# Patient Record
Sex: FEMALE | Race: WHITE | NOT HISPANIC OR LATINO | Employment: UNEMPLOYED | URBAN - METROPOLITAN AREA
[De-identification: names, ages, dates, MRNs, and addresses within clinical notes are randomized per-mention and may not be internally consistent; named-entity substitution may affect disease eponyms.]

---

## 2016-07-26 LAB
EXTERNAL ABO GROUPING: NORMAL
EXTERNAL ANTIBODY SCREEN: NORMAL
EXTERNAL HEPATITIS B SURFACE ANTIGEN: NEGATIVE
EXTERNAL HIV-1 ANTIBODY: NEGATIVE
EXTERNAL RH FACTOR: NEGATIVE
EXTERNAL SYPHILIS RPR SCREEN: NORMAL

## 2016-10-25 LAB
EXTERNAL CHLAMYDIA SCREEN: NEGATIVE
EXTERNAL GONORRHEA SCREEN: NEGATIVE

## 2017-01-04 ENCOUNTER — GENERIC CONVERSION - ENCOUNTER (OUTPATIENT)
Dept: OTHER | Facility: OTHER | Age: 27
End: 2017-01-04

## 2017-01-11 ENCOUNTER — GENERIC CONVERSION - ENCOUNTER (OUTPATIENT)
Dept: OTHER | Facility: OTHER | Age: 27
End: 2017-01-11

## 2017-01-12 ENCOUNTER — GENERIC CONVERSION - ENCOUNTER (OUTPATIENT)
Dept: OTHER | Facility: OTHER | Age: 27
End: 2017-01-12

## 2017-01-17 ENCOUNTER — GENERIC CONVERSION - ENCOUNTER (OUTPATIENT)
Dept: OTHER | Facility: OTHER | Age: 27
End: 2017-01-17

## 2017-01-19 ENCOUNTER — GENERIC CONVERSION - ENCOUNTER (OUTPATIENT)
Dept: OTHER | Facility: OTHER | Age: 27
End: 2017-01-19

## 2017-01-25 ENCOUNTER — GENERIC CONVERSION - ENCOUNTER (OUTPATIENT)
Dept: OTHER | Facility: OTHER | Age: 27
End: 2017-01-25

## 2017-01-31 ENCOUNTER — GENERIC CONVERSION - ENCOUNTER (OUTPATIENT)
Dept: OTHER | Facility: OTHER | Age: 27
End: 2017-01-31

## 2017-01-31 ENCOUNTER — HOSPITAL ENCOUNTER (OUTPATIENT)
Facility: HOSPITAL | Age: 27
Discharge: HOME | End: 2017-01-31
Attending: OBSTETRICS & GYNECOLOGY | Admitting: OBSTETRICS & GYNECOLOGY
Payer: COMMERCIAL

## 2017-01-31 VITALS
DIASTOLIC BLOOD PRESSURE: 73 MMHG | SYSTOLIC BLOOD PRESSURE: 115 MMHG | RESPIRATION RATE: 18 BRPM | HEART RATE: 93 BPM | TEMPERATURE: 98.4 F

## 2017-01-31 PROBLEM — Z3A.40 40 WEEKS GESTATION OF PREGNANCY: Status: ACTIVE | Noted: 2017-01-31

## 2017-01-31 PROCEDURE — 59025 FETAL NON-STRESS TEST: CPT | Performed by: OBSTETRICS & GYNECOLOGY

## 2017-01-31 PROCEDURE — 76815 OB US LIMITED FETUS(S): CPT | Performed by: OBSTETRICS & GYNECOLOGY

## 2017-01-31 PROCEDURE — 99203 OFFICE O/P NEW LOW 30 MIN: CPT

## 2017-02-01 ENCOUNTER — GENERIC CONVERSION - ENCOUNTER (OUTPATIENT)
Dept: OTHER | Facility: OTHER | Age: 27
End: 2017-02-01

## 2017-02-03 ENCOUNTER — GENERIC CONVERSION - ENCOUNTER (OUTPATIENT)
Dept: OTHER | Facility: OTHER | Age: 27
End: 2017-02-03

## 2017-02-04 ENCOUNTER — ANESTHESIA EVENT (INPATIENT)
Dept: LABOR AND DELIVERY | Facility: HOSPITAL | Age: 27
End: 2017-02-04
Payer: COMMERCIAL

## 2017-02-04 ENCOUNTER — ANESTHESIA (INPATIENT)
Dept: LABOR AND DELIVERY | Facility: HOSPITAL | Age: 27
End: 2017-02-04
Payer: COMMERCIAL

## 2017-02-04 ENCOUNTER — HOSPITAL ENCOUNTER (INPATIENT)
Facility: HOSPITAL | Age: 27
LOS: 2 days | Discharge: HOME | End: 2017-02-06
Attending: OBSTETRICS & GYNECOLOGY | Admitting: OBSTETRICS & GYNECOLOGY
Payer: COMMERCIAL

## 2017-02-04 DIAGNOSIS — Z3A.40 40 WEEKS GESTATION OF PREGNANCY: Primary | ICD-10-CM

## 2017-02-04 LAB
ABO GROUP BLD: NORMAL
AMPHETAMINES SERPL QL SCN: NEGATIVE
BARBITURATES UR QL: NEGATIVE
BASE EXCESS BLDCOA CALC-SCNC: -3.4 MMOL/L (ref 3–11)
BASE EXCESS BLDCOV CALC-SCNC: -4.4 MMOL/L (ref 1–9)
BASOPHILS # BLD AUTO: 0.02 THOUSANDS/ΜL (ref 0–0.1)
BASOPHILS NFR BLD AUTO: 0 % (ref 0–1)
BENZODIAZ UR QL: NEGATIVE
BLD GP AB SCN SERPL QL: NEGATIVE
COCAINE UR QL: NEGATIVE
EOSINOPHIL # BLD AUTO: 0.31 THOUSAND/ΜL (ref 0–0.61)
EOSINOPHIL NFR BLD AUTO: 3 % (ref 0–6)
ERYTHROCYTE [DISTWIDTH] IN BLOOD BY AUTOMATED COUNT: 13 % (ref 11.6–15.1)
HCO3 BLDCOA-SCNC: 25.1 MMOL/L (ref 17.3–27.3)
HCO3 BLDCOV-SCNC: 22.6 MMOL/L (ref 12.2–28.6)
HCT VFR BLD AUTO: 39.2 % (ref 34.8–46.1)
HGB BLD-MCNC: 13.6 G/DL (ref 11.5–15.4)
LYMPHOCYTES # BLD AUTO: 2.69 THOUSANDS/ΜL (ref 0.6–4.47)
LYMPHOCYTES NFR BLD AUTO: 26 % (ref 14–44)
MCH RBC QN AUTO: 30.8 PG (ref 26.8–34.3)
MCHC RBC AUTO-ENTMCNC: 34.7 G/DL (ref 31.4–37.4)
MCV RBC AUTO: 89 FL (ref 82–98)
METHADONE UR QL: NEGATIVE
MONOCYTES # BLD AUTO: 0.75 THOUSAND/ΜL (ref 0.17–1.22)
MONOCYTES NFR BLD AUTO: 7 % (ref 4–12)
NEUTROPHILS # BLD AUTO: 6.47 THOUSANDS/ΜL (ref 1.85–7.62)
NEUTS SEG NFR BLD AUTO: 64 % (ref 43–75)
NRBC BLD AUTO-RTO: 0 /100 WBCS
O2 CT VFR BLDCOA CALC: 4.6 ML/DL
OPIATES UR QL SCN: NEGATIVE
OXYHGB MFR BLDCOA: 20.3 %
OXYHGB MFR BLDCOV: 48.7 %
PCO2 BLDCOA: 58.6 MM[HG] (ref 30–60)
PCO2 BLDCOV: 48.4 MM HG (ref 27–43)
PCP UR QL: NEGATIVE
PH BLDCOA: 7.25 [PH] (ref 7.23–7.43)
PH BLDCOV: 7.29 [PH] (ref 7.19–7.49)
PLATELET # BLD AUTO: 204 THOUSANDS/UL (ref 149–390)
PMV BLD AUTO: 9.7 FL (ref 8.9–12.7)
PO2 BLDCOA: 14.2 MM HG (ref 5–25)
PO2 BLDCOV: 22.7 MM HG (ref 15–45)
RBC # BLD AUTO: 4.41 MILLION/UL (ref 3.81–5.12)
RH BLD: NEGATIVE
SAO2 % BLDCOV: 11.2 ML/DL
THC UR QL: NEGATIVE
WBC # BLD AUTO: 10.24 THOUSAND/UL (ref 4.31–10.16)

## 2017-02-04 PROCEDURE — 86900 BLOOD TYPING SEROLOGIC ABO: CPT | Performed by: OBSTETRICS & GYNECOLOGY

## 2017-02-04 PROCEDURE — 76815 OB US LIMITED FETUS(S): CPT | Performed by: OBSTETRICS & GYNECOLOGY

## 2017-02-04 PROCEDURE — 85025 COMPLETE CBC W/AUTO DIFF WBC: CPT | Performed by: OBSTETRICS & GYNECOLOGY

## 2017-02-04 PROCEDURE — 0UQMXZZ REPAIR VULVA, EXTERNAL APPROACH: ICD-10-PCS | Performed by: OBSTETRICS & GYNECOLOGY

## 2017-02-04 PROCEDURE — 86592 SYPHILIS TEST NON-TREP QUAL: CPT | Performed by: OBSTETRICS & GYNECOLOGY

## 2017-02-04 PROCEDURE — 3E033VJ INTRODUCTION OF OTHER HORMONE INTO PERIPHERAL VEIN, PERCUTANEOUS APPROACH: ICD-10-PCS | Performed by: OBSTETRICS & GYNECOLOGY

## 2017-02-04 PROCEDURE — 86901 BLOOD TYPING SEROLOGIC RH(D): CPT | Performed by: OBSTETRICS & GYNECOLOGY

## 2017-02-04 PROCEDURE — 86850 RBC ANTIBODY SCREEN: CPT | Performed by: OBSTETRICS & GYNECOLOGY

## 2017-02-04 PROCEDURE — 82805 BLOOD GASES W/O2 SATURATION: CPT | Performed by: OBSTETRICS & GYNECOLOGY

## 2017-02-04 PROCEDURE — 80307 DRUG TEST PRSMV CHEM ANLYZR: CPT | Performed by: OBSTETRICS & GYNECOLOGY

## 2017-02-04 RX ORDER — LIDOCAINE HYDROCHLORIDE 20 MG/ML
INJECTION, SOLUTION EPIDURAL; INFILTRATION; INTRACAUDAL; PERINEURAL AS NEEDED
Status: DISCONTINUED | OUTPATIENT
Start: 2017-02-04 | End: 2017-02-05 | Stop reason: SURG

## 2017-02-04 RX ORDER — OXYTOCIN/RINGER'S LACTATE 30/500 ML
1-30 PLASTIC BAG, INJECTION (ML) INTRAVENOUS
Status: DISCONTINUED | OUTPATIENT
Start: 2017-02-04 | End: 2017-02-06 | Stop reason: HOSPADM

## 2017-02-04 RX ORDER — LIDOCAINE HYDROCHLORIDE AND EPINEPHRINE 20; 5 MG/ML; UG/ML
INJECTION, SOLUTION EPIDURAL; INFILTRATION; INTRACAUDAL; PERINEURAL AS NEEDED
Status: DISCONTINUED | OUTPATIENT
Start: 2017-02-04 | End: 2017-02-04

## 2017-02-04 RX ORDER — SIMETHICONE 80 MG
80 TABLET,CHEWABLE ORAL 4 TIMES DAILY PRN
Status: DISCONTINUED | OUTPATIENT
Start: 2017-02-04 | End: 2017-02-06 | Stop reason: HOSPADM

## 2017-02-04 RX ORDER — BISACODYL 10 MG
10 SUPPOSITORY, RECTAL RECTAL AS NEEDED
Status: DISCONTINUED | OUTPATIENT
Start: 2017-02-04 | End: 2017-02-06 | Stop reason: HOSPADM

## 2017-02-04 RX ORDER — ONDANSETRON 2 MG/ML
4 INJECTION INTRAMUSCULAR; INTRAVENOUS EVERY 6 HOURS PRN
Status: DISCONTINUED | OUTPATIENT
Start: 2017-02-04 | End: 2017-02-04

## 2017-02-04 RX ORDER — ROPIVACAINE HYDROCHLORIDE 2 MG/ML
INJECTION, SOLUTION EPIDURAL; INFILTRATION; PERINEURAL CONTINUOUS PRN
Status: DISCONTINUED | OUTPATIENT
Start: 2017-02-04 | End: 2017-02-05 | Stop reason: SURG

## 2017-02-04 RX ORDER — IBUPROFEN 600 MG/1
600 TABLET ORAL EVERY 6 HOURS PRN
Status: DISCONTINUED | OUTPATIENT
Start: 2017-02-04 | End: 2017-02-06 | Stop reason: HOSPADM

## 2017-02-04 RX ORDER — DIAPER,BRIEF,INFANT-TODD,DISP
1 EACH MISCELLANEOUS AS NEEDED
Status: DISCONTINUED | OUTPATIENT
Start: 2017-02-04 | End: 2017-02-06 | Stop reason: HOSPADM

## 2017-02-04 RX ORDER — DOCUSATE SODIUM 100 MG/1
100 CAPSULE, LIQUID FILLED ORAL 2 TIMES DAILY
Status: DISCONTINUED | OUTPATIENT
Start: 2017-02-05 | End: 2017-02-06 | Stop reason: HOSPADM

## 2017-02-04 RX ORDER — PROMETHAZINE HYDROCHLORIDE 25 MG/ML
12.5 INJECTION, SOLUTION INTRAMUSCULAR; INTRAVENOUS ONCE
Status: COMPLETED | OUTPATIENT
Start: 2017-02-04 | End: 2017-02-04

## 2017-02-04 RX ORDER — DIPHENHYDRAMINE HCL 25 MG
25 TABLET ORAL EVERY 6 HOURS PRN
Status: DISCONTINUED | OUTPATIENT
Start: 2017-02-04 | End: 2017-02-06 | Stop reason: HOSPADM

## 2017-02-04 RX ORDER — BUTORPHANOL TARTRATE 1 MG/ML
1 INJECTION, SOLUTION INTRAMUSCULAR; INTRAVENOUS ONCE
Status: COMPLETED | OUTPATIENT
Start: 2017-02-04 | End: 2017-02-04

## 2017-02-04 RX ORDER — PNV NO.95/FERROUS FUM/FOLIC AC 28MG-0.8MG
TABLET ORAL
COMMUNITY
End: 2020-07-15 | Stop reason: CLARIF

## 2017-02-04 RX ORDER — ACETAMINOPHEN 325 MG/1
650 TABLET ORAL EVERY 6 HOURS PRN
Status: DISCONTINUED | OUTPATIENT
Start: 2017-02-04 | End: 2017-02-06 | Stop reason: HOSPADM

## 2017-02-04 RX ORDER — SODIUM CHLORIDE, SODIUM LACTATE, POTASSIUM CHLORIDE, CALCIUM CHLORIDE 600; 310; 30; 20 MG/100ML; MG/100ML; MG/100ML; MG/100ML
125 INJECTION, SOLUTION INTRAVENOUS CONTINUOUS
Status: DISCONTINUED | OUTPATIENT
Start: 2017-02-04 | End: 2017-02-06 | Stop reason: HOSPADM

## 2017-02-04 RX ORDER — METOCLOPRAMIDE HYDROCHLORIDE 5 MG/ML
10 INJECTION INTRAMUSCULAR; INTRAVENOUS EVERY 6 HOURS PRN
Status: DISCONTINUED | OUTPATIENT
Start: 2017-02-04 | End: 2017-02-06 | Stop reason: HOSPADM

## 2017-02-04 RX ADMIN — SODIUM CHLORIDE, SODIUM LACTATE, POTASSIUM CHLORIDE, AND CALCIUM CHLORIDE 999 ML/HR: .6; .31; .03; .02 INJECTION, SOLUTION INTRAVENOUS at 14:30

## 2017-02-04 RX ADMIN — BUTORPHANOL TARTRATE 1 MG: 1 INJECTION, SOLUTION INTRAMUSCULAR; INTRAVENOUS at 14:55

## 2017-02-04 RX ADMIN — PROMETHAZINE HYDROCHLORIDE 12.5 MG: 25 INJECTION INTRAMUSCULAR; INTRAVENOUS at 15:13

## 2017-02-04 RX ADMIN — MISOPROSTOL 25 MCG: 100 TABLET ORAL at 07:54

## 2017-02-04 RX ADMIN — Medication 2 MILLI-UNITS/MIN: at 17:16

## 2017-02-04 RX ADMIN — HYDROCORTISONE 1 APPLICATION: 10 CREAM TOPICAL at 23:42

## 2017-02-04 RX ADMIN — LIDOCAINE HYDROCHLORIDE 5 ML: 20 INJECTION, SOLUTION EPIDURAL; INFILTRATION; INTRACAUDAL; PERINEURAL at 16:19

## 2017-02-04 RX ADMIN — SODIUM CHLORIDE, SODIUM LACTATE, POTASSIUM CHLORIDE, AND CALCIUM CHLORIDE 125 ML/HR: .6; .31; .03; .02 INJECTION, SOLUTION INTRAVENOUS at 15:31

## 2017-02-04 RX ADMIN — WITCH HAZEL 1 PAD: 500 SOLUTION RECTAL; TOPICAL at 23:43

## 2017-02-04 RX ADMIN — ROPIVACAINE HYDROCHLORIDE 2 ML/HR: 2 INJECTION, SOLUTION EPIDURAL; INFILTRATION at 15:10

## 2017-02-04 RX ADMIN — IBUPROFEN 600 MG: 600 TABLET, FILM COATED ORAL at 23:42

## 2017-02-04 RX ADMIN — BENZOCAINE AND MENTHOL 1 APPLICATION: 20; .5 SPRAY TOPICAL at 23:43

## 2017-02-04 RX ADMIN — LIDOCAINE HYDROCHLORIDE 5 ML: 20 INJECTION, SOLUTION EPIDURAL; INFILTRATION; INTRACAUDAL; PERINEURAL at 16:28

## 2017-02-04 RX ADMIN — SODIUM CHLORIDE 5 MILLION UNITS: 0.9 INJECTION, SOLUTION INTRAVENOUS at 16:06

## 2017-02-04 RX ADMIN — SODIUM CHLORIDE 2.5 MILLION UNITS: 9 INJECTION, SOLUTION INTRAVENOUS at 20:04

## 2017-02-04 RX ADMIN — MISOPROSTOL 25 MCG: 100 TABLET ORAL at 11:10

## 2017-02-04 RX ADMIN — ONDANSETRON 4 MG: 2 INJECTION INTRAMUSCULAR; INTRAVENOUS at 21:27

## 2017-02-05 LAB
ABO GROUP BLD: NORMAL
BLD GP AB SCN SERPL QL: NEGATIVE
FETAL CELL SCN BLD QL ROSETTE: NEGATIVE
RH BLD: NEGATIVE

## 2017-02-05 PROCEDURE — 99213 OFFICE O/P EST LOW 20 MIN: CPT

## 2017-02-05 PROCEDURE — 86900 BLOOD TYPING SEROLOGIC ABO: CPT | Performed by: OBSTETRICS & GYNECOLOGY

## 2017-02-05 PROCEDURE — 86850 RBC ANTIBODY SCREEN: CPT | Performed by: OBSTETRICS & GYNECOLOGY

## 2017-02-05 PROCEDURE — 86901 BLOOD TYPING SEROLOGIC RH(D): CPT | Performed by: OBSTETRICS & GYNECOLOGY

## 2017-02-05 PROCEDURE — 85461 HEMOGLOBIN FETAL: CPT | Performed by: OBSTETRICS & GYNECOLOGY

## 2017-02-05 RX ADMIN — ACETAMINOPHEN 650 MG: 325 TABLET, FILM COATED ORAL at 04:28

## 2017-02-05 RX ADMIN — DOCUSATE SODIUM 100 MG: 100 CAPSULE, LIQUID FILLED ORAL at 18:24

## 2017-02-05 RX ADMIN — IBUPROFEN 600 MG: 600 TABLET, FILM COATED ORAL at 07:12

## 2017-02-05 RX ADMIN — IBUPROFEN 600 MG: 600 TABLET, FILM COATED ORAL at 13:20

## 2017-02-05 RX ADMIN — DOCUSATE SODIUM 100 MG: 100 CAPSULE, LIQUID FILLED ORAL at 08:31

## 2017-02-05 RX ADMIN — ACETAMINOPHEN 650 MG: 325 TABLET, FILM COATED ORAL at 18:24

## 2017-02-05 RX ADMIN — IBUPROFEN 600 MG: 600 TABLET, FILM COATED ORAL at 21:07

## 2017-02-05 RX ADMIN — HUMAN RHO(D) IMMUNE GLOBULIN 300 MCG: 300 INJECTION, SOLUTION INTRAMUSCULAR at 10:23

## 2017-02-06 VITALS
SYSTOLIC BLOOD PRESSURE: 106 MMHG | HEART RATE: 75 BPM | DIASTOLIC BLOOD PRESSURE: 52 MMHG | WEIGHT: 200 LBS | BODY MASS INDEX: 33.32 KG/M2 | HEIGHT: 65 IN | RESPIRATION RATE: 18 BRPM | TEMPERATURE: 97.8 F

## 2017-02-06 LAB — RPR SER QL: NORMAL

## 2017-02-06 PROCEDURE — 90707 MMR VACCINE SC: CPT | Performed by: OBSTETRICS & GYNECOLOGY

## 2017-02-06 RX ORDER — ACETAMINOPHEN 325 MG/1
650 TABLET ORAL EVERY 6 HOURS PRN
Qty: 30 TABLET | Refills: 0
Start: 2017-02-06 | End: 2017-03-08

## 2017-02-06 RX ORDER — DIAPER,BRIEF,INFANT-TODD,DISP
1 EACH MISCELLANEOUS AS NEEDED
Qty: 30 G | Refills: 0
Start: 2017-02-06 | End: 2020-07-22 | Stop reason: HOSPADM

## 2017-02-06 RX ORDER — IBUPROFEN 600 MG/1
600 TABLET ORAL EVERY 6 HOURS PRN
Qty: 30 TABLET | Refills: 0
Start: 2017-02-06 | End: 2020-07-22 | Stop reason: HOSPADM

## 2017-02-06 RX ORDER — DOCUSATE SODIUM 100 MG/1
100 CAPSULE, LIQUID FILLED ORAL 2 TIMES DAILY
Qty: 60 CAPSULE | Refills: 0
Start: 2017-02-06 | End: 2020-07-22 | Stop reason: HOSPADM

## 2017-02-06 RX ADMIN — MEASLES, MUMPS, AND RUBELLA VIRUS VACCINE LIVE 0.5 ML: 1000; 12500; 1000 INJECTION, POWDER, LYOPHILIZED, FOR SUSPENSION SUBCUTANEOUS at 08:05

## 2017-02-06 RX ADMIN — ACETAMINOPHEN 650 MG: 325 TABLET, FILM COATED ORAL at 10:18

## 2017-02-06 RX ADMIN — DOCUSATE SODIUM 100 MG: 100 CAPSULE, LIQUID FILLED ORAL at 08:05

## 2017-02-06 RX ADMIN — IBUPROFEN 600 MG: 600 TABLET, FILM COATED ORAL at 05:58

## 2017-02-09 ENCOUNTER — TELEPHONE (OUTPATIENT)
Dept: LABOR AND DELIVERY | Facility: HOSPITAL | Age: 27
End: 2017-02-09

## 2017-02-10 ENCOUNTER — GENERIC CONVERSION - ENCOUNTER (OUTPATIENT)
Dept: OTHER | Facility: OTHER | Age: 27
End: 2017-02-10

## 2017-02-13 ENCOUNTER — GENERIC CONVERSION - ENCOUNTER (OUTPATIENT)
Dept: OTHER | Facility: OTHER | Age: 27
End: 2017-02-13

## 2017-02-13 LAB — PLACENTA IN STORAGE: NORMAL

## 2017-02-15 ENCOUNTER — GENERIC CONVERSION - ENCOUNTER (OUTPATIENT)
Dept: OTHER | Facility: OTHER | Age: 27
End: 2017-02-15

## 2017-02-28 ENCOUNTER — ALLSCRIPTS OFFICE VISIT (OUTPATIENT)
Dept: OTHER | Facility: OTHER | Age: 27
End: 2017-02-28

## 2017-02-28 DIAGNOSIS — Z00.00 ENCOUNTER FOR GENERAL ADULT MEDICAL EXAMINATION WITHOUT ABNORMAL FINDINGS: ICD-10-CM

## 2017-02-28 DIAGNOSIS — B19.20 VIRAL HEPATITIS C WITHOUT HEPATIC COMA: ICD-10-CM

## 2017-02-28 DIAGNOSIS — O98.412: ICD-10-CM

## 2017-03-07 ENCOUNTER — GENERIC CONVERSION - ENCOUNTER (OUTPATIENT)
Dept: OTHER | Facility: OTHER | Age: 27
End: 2017-03-07

## 2017-03-09 ENCOUNTER — ALLSCRIPTS OFFICE VISIT (OUTPATIENT)
Dept: OTHER | Facility: OTHER | Age: 27
End: 2017-03-09

## 2017-03-17 ENCOUNTER — ALLSCRIPTS OFFICE VISIT (OUTPATIENT)
Dept: OTHER | Facility: OTHER | Age: 27
End: 2017-03-17

## 2017-03-17 LAB — HGB BLD-MCNC: 13.3 G/DL

## 2017-04-03 ENCOUNTER — GENERIC CONVERSION - ENCOUNTER (OUTPATIENT)
Dept: OTHER | Facility: OTHER | Age: 27
End: 2017-04-03

## 2017-04-22 ENCOUNTER — LAB REQUISITION (OUTPATIENT)
Dept: LAB | Facility: HOSPITAL | Age: 27
End: 2017-04-22
Payer: COMMERCIAL

## 2017-04-22 DIAGNOSIS — R07.0 PAIN IN THROAT: ICD-10-CM

## 2017-04-22 PROCEDURE — 87070 CULTURE OTHR SPECIMN AEROBIC: CPT | Performed by: NURSE PRACTITIONER

## 2017-04-22 PROCEDURE — 87147 CULTURE TYPE IMMUNOLOGIC: CPT | Performed by: NURSE PRACTITIONER

## 2017-04-24 LAB — BACTERIA THROAT CULT: NORMAL

## 2017-04-29 ENCOUNTER — ALLSCRIPTS OFFICE VISIT (OUTPATIENT)
Dept: OTHER | Facility: OTHER | Age: 27
End: 2017-04-29

## 2017-05-02 ENCOUNTER — ALLSCRIPTS OFFICE VISIT (OUTPATIENT)
Dept: OTHER | Facility: OTHER | Age: 27
End: 2017-05-02

## 2017-05-02 DIAGNOSIS — A49.1 STREPTOCOCCUS INFECTION: ICD-10-CM

## 2017-05-15 ENCOUNTER — GENERIC CONVERSION - ENCOUNTER (OUTPATIENT)
Dept: OTHER | Facility: OTHER | Age: 27
End: 2017-05-15

## 2017-05-16 ENCOUNTER — APPOINTMENT (OUTPATIENT)
Dept: LAB | Facility: HOSPITAL | Age: 27
End: 2017-05-16
Payer: COMMERCIAL

## 2017-05-16 ENCOUNTER — APPOINTMENT (OUTPATIENT)
Dept: LAB | Facility: HOSPITAL | Age: 27
End: 2017-05-16
Attending: INTERNAL MEDICINE
Payer: COMMERCIAL

## 2017-05-16 DIAGNOSIS — A49.1 STREPTOCOCCUS INFECTION: ICD-10-CM

## 2017-05-16 DIAGNOSIS — O98.412: ICD-10-CM

## 2017-05-16 LAB
ALBUMIN SERPL BCP-MCNC: 3.8 G/DL (ref 3.5–5)
ALP SERPL-CCNC: 115 U/L (ref 46–116)
ALT SERPL W P-5'-P-CCNC: 273 U/L (ref 12–78)
AST SERPL W P-5'-P-CCNC: 128 U/L (ref 5–45)
BASOPHILS # BLD AUTO: 0.03 THOUSANDS/ΜL (ref 0–0.1)
BASOPHILS NFR BLD AUTO: 0 % (ref 0–1)
BILIRUB DIRECT SERPL-MCNC: 0.18 MG/DL (ref 0–0.2)
BILIRUB SERPL-MCNC: 0.53 MG/DL (ref 0.2–1)
EOSINOPHIL # BLD AUTO: 1.06 THOUSAND/ΜL (ref 0–0.61)
EOSINOPHIL NFR BLD AUTO: 15 % (ref 0–6)
ERYTHROCYTE [DISTWIDTH] IN BLOOD BY AUTOMATED COUNT: 12.9 % (ref 11.6–15.1)
HCT VFR BLD AUTO: 38.5 % (ref 34.8–46.1)
HGB BLD-MCNC: 13.2 G/DL (ref 11.5–15.4)
LYMPHOCYTES # BLD AUTO: 2.33 THOUSANDS/ΜL (ref 0.6–4.47)
LYMPHOCYTES NFR BLD AUTO: 33 % (ref 14–44)
MCH RBC QN AUTO: 29.9 PG (ref 26.8–34.3)
MCHC RBC AUTO-ENTMCNC: 34.3 G/DL (ref 31.4–37.4)
MCV RBC AUTO: 87 FL (ref 82–98)
MONOCYTES # BLD AUTO: 0.59 THOUSAND/ΜL (ref 0.17–1.22)
MONOCYTES NFR BLD AUTO: 9 % (ref 4–12)
NEUTROPHILS # BLD AUTO: 2.95 THOUSANDS/ΜL (ref 1.85–7.62)
NEUTS SEG NFR BLD AUTO: 43 % (ref 43–75)
NRBC BLD AUTO-RTO: 0 /100 WBCS
PLATELET # BLD AUTO: 293 THOUSANDS/UL (ref 149–390)
PMV BLD AUTO: 9.2 FL (ref 8.9–12.7)
PROT SERPL-MCNC: 7 G/DL (ref 6.4–8.2)
RBC # BLD AUTO: 4.41 MILLION/UL (ref 3.81–5.12)
WBC # BLD AUTO: 6.97 THOUSAND/UL (ref 4.31–10.16)

## 2017-05-16 PROCEDURE — 80076 HEPATIC FUNCTION PANEL: CPT

## 2017-05-16 PROCEDURE — 36415 COLL VENOUS BLD VENIPUNCTURE: CPT

## 2017-05-16 PROCEDURE — 85025 COMPLETE CBC W/AUTO DIFF WBC: CPT

## 2017-05-20 ENCOUNTER — GENERIC CONVERSION - ENCOUNTER (OUTPATIENT)
Dept: OTHER | Facility: OTHER | Age: 27
End: 2017-05-20

## 2017-05-25 ENCOUNTER — GENERIC CONVERSION - ENCOUNTER (OUTPATIENT)
Dept: OTHER | Facility: OTHER | Age: 27
End: 2017-05-25

## 2017-06-21 ENCOUNTER — GENERIC CONVERSION - ENCOUNTER (OUTPATIENT)
Dept: OTHER | Facility: OTHER | Age: 27
End: 2017-06-21

## 2017-06-27 ENCOUNTER — ALLSCRIPTS OFFICE VISIT (OUTPATIENT)
Dept: OTHER | Facility: OTHER | Age: 27
End: 2017-06-27

## 2017-08-22 ENCOUNTER — GENERIC CONVERSION - ENCOUNTER (OUTPATIENT)
Dept: OTHER | Facility: OTHER | Age: 27
End: 2017-08-22

## 2017-10-17 ENCOUNTER — TRANSCRIBE ORDERS (OUTPATIENT)
Dept: ADMINISTRATIVE | Facility: HOSPITAL | Age: 27
End: 2017-10-17

## 2017-10-17 DIAGNOSIS — R19.7 DIARRHEA, UNSPECIFIED TYPE: ICD-10-CM

## 2017-10-17 DIAGNOSIS — R10.32 LEFT LOWER QUADRANT PAIN: ICD-10-CM

## 2017-10-17 DIAGNOSIS — B18.2 CHRONIC HEPATITIS C WITH HEPATIC COMA (HCC): ICD-10-CM

## 2017-10-17 DIAGNOSIS — K59.00 CONSTIPATION, UNSPECIFIED CONSTIPATION TYPE: Primary | ICD-10-CM

## 2017-10-17 DIAGNOSIS — K62.5 HEMORRHAGE OF ANUS AND RECTUM: ICD-10-CM

## 2017-11-20 ENCOUNTER — GENERIC CONVERSION - ENCOUNTER (OUTPATIENT)
Dept: OTHER | Facility: OTHER | Age: 27
End: 2017-11-20

## 2017-12-26 ENCOUNTER — GENERIC CONVERSION - ENCOUNTER (OUTPATIENT)
Dept: OTHER | Facility: OTHER | Age: 27
End: 2017-12-26

## 2018-01-04 ENCOUNTER — GENERIC CONVERSION - ENCOUNTER (OUTPATIENT)
Dept: OTHER | Facility: OTHER | Age: 28
End: 2018-01-04

## 2018-01-09 ENCOUNTER — GENERIC CONVERSION - ENCOUNTER (OUTPATIENT)
Dept: OTHER | Facility: OTHER | Age: 28
End: 2018-01-09

## 2018-01-09 NOTE — MISCELLANEOUS
Message   Date: 15 Feb 2017 10:58 AM EST, Recorded By: Avery Nolasco For: Sharon Kingod: Ike Chavarria, Self   Phone: [phone] (Home), [phone] (Work)   Reason: Medical Complaint   pt called, she is 12 days pp and was having heavier bleeding    pt was doing a lot of activity    pt was advised to take Ibuprofen every 6 hours and to call us if no improvement in a few days           Active Problems    1  ASCUS with positive high risk HPV cervical (795 01,795 05) (R87 610,R87 810)   2  Chronic hepatitis C affecting antepartum care of mother in second trimester   (647 63,070 54) (O98 412,B18 2)   3  Condyloma acuminata (078 11) (A63 0)   4  Depression with anxiety (300 4) (F41 8)   5  Drug use (305 90) (F19 90)   6  Hepatitis C virus (070 70) (B19 20)   7  Hepatitis C, acute (070 51) (B17 10)   8  History of drug abuse (305 93) (Z87 898)   9  History of heroin abuse (305 53) (Z87 898)   10  History of recurrent UTI (urinary tract infection) (V13 02) (Z87 440)   11  Pregnancy, obstetrical care (V22 1) (Z34 90)   12  Rh negative status during pregnancy, unspecified trimester (V23 89) (O09 899)   13  Suspected damage to fetus from drugs, affecting management of mother, antepartum,    not applicable or unspecified fetus (655 53) (O35 5XX0)    Current Meds   1  Cranberry TABS; Therapy: (Recorded:91Xyq0870) to Recorded   2  Prenate Essential 29-0 6-0 4-340 MG Oral Capsule; One daily; Therapy: 66SIF9021 to (Last Rx:03Nov2016)  Requested for: 51GAW8563 Ordered   3  Probiotic CAPS; Therapy: (Recorded:19Jan2017) to Recorded   4  Tylenol Extra Strength 500 MG Oral Tablet; Therapy: (Recorded:30Jun2016) to Recorded    Allergies    1  No Known Drug Allergies    2  Animal dander - Cats   3  Animal dander - Dogs   4  Latex   5   Pollen    Signatures   Electronically signed by : Mago Corcoran, ; Feb 15 2017 11:00AM EST                       (Author)

## 2018-01-09 NOTE — MISCELLANEOUS
Message   Date: 22 Nov 2016 1:27 PM EST, Recorded By: Kamla Scott For: Rosa Maldonado: Leon Louise   Phone: [phone] (Home), [phone] (Work)   Reason: Other   Patient called, states feels better, went to [facility], was given fluids  Now is calling to reschedule her appt due to work schedule  Active Problems    1  ASCUS with positive high risk HPV cervical (795 01,795 05) (R87 610,R87 810)   2  Chronic hepatitis C affecting antepartum care of mother in second trimester   (647 63,070 54) (O98 412,B18 2)   3  Condyloma acuminata (078 11) (A63 0)   4  Depression with anxiety (300 4) (F41 8)   5  Drug use (305 90) (F19 90)   6  Exposure to parvovirus (V01 79) (Z20 828)   7  Hepatitis C virus (070 70) (B19 20)   8  Hepatitis C, acute (070 51) (B17 10)   9  History of drug abuse (305 93) (Z87 898)   10  Need for influenza vaccination (V04 81) (Z23)   11  Need for Tdap vaccination (V06 1) (Z23)   12  Pregnancy, obstetrical care (V22 1) (Z34 90)   13  Rh negative status during pregnancy, unspecified trimester (V23 89) (O09 899)   14  Suspected damage to fetus from drugs, affecting management of mother, antepartum,    not applicable or unspecified fetus (655 53) (O35 5XX0)    Current Meds   1  Prenate Essential 29-0 6-0 4-340 MG Oral Capsule; One daily; Therapy: 72YPL0875 to (Last Rx:03Nov2016)  Requested for: 27FXT8675 Ordered   2  Tylenol Extra Strength 500 MG Oral Tablet; Therapy: (Recorded:30Jun2016) to Recorded    Allergies    1  No Known Drug Allergies    2  Animal dander - Cats   3  Animal dander - Dogs   4  Latex   5   Pollen    Signatures   Electronically signed by : Chelsea Carlson, ; Nov 22 2016  1:28PM EST                       (Author)

## 2018-01-09 NOTE — MISCELLANEOUS
Message   Date: 13 Feb 2017 12:27 PM EST, Recorded By: Julia Allen For: Maya Gill   Caller: Leon Chapman   Phone: [phone] (Home), [phone] (Work)   Reason: Medical Complaint   pt is having urine frequency, but no pain  Lareleazar Lazcano advised pt to go to urgent care or PCP  Rocio Lazcano Active Problems    1  ASCUS with positive high risk HPV cervical (795 01,795 05) (R87 610,R87 810)   2  Chronic hepatitis C affecting antepartum care of mother in second trimester   (647 63,070 54) (O98 412,B18 2)   3  Condyloma acuminata (078 11) (A63 0)   4  Depression with anxiety (300 4) (F41 8)   5  Drug use (305 90) (F19 90)   6  Hepatitis C virus (070 70) (B19 20)   7  Hepatitis C, acute (070 51) (B17 10)   8  History of drug abuse (305 93) (Z87 898)   9  History of heroin abuse (305 53) (Z87 898)   10  History of recurrent UTI (urinary tract infection) (V13 02) (Z87 440)   11  Pregnancy, obstetrical care (V22 1) (Z34 90)   12  Rh negative status during pregnancy, unspecified trimester (V23 89) (O09 899)   13  Suspected damage to fetus from drugs, affecting management of mother, antepartum,    not applicable or unspecified fetus (655 53) (O35 5XX0)    Current Meds   1  Cranberry TABS; Therapy: (Recorded:81Jdi9012) to Recorded   2  Prenate Essential 29-0 6-0 4-340 MG Oral Capsule; One daily; Therapy: 58EOZ7058 to (Last Rx:03Nov2016)  Requested for: 36BDO7213 Ordered   3  Probiotic CAPS; Therapy: (Recorded:19Jan2017) to Recorded   4  Tylenol Extra Strength 500 MG Oral Tablet; Therapy: (Recorded:30Jun2016) to Recorded    Allergies    1  No Known Drug Allergies    2  Animal dander - Cats   3  Animal dander - Dogs   4  Latex   5   Pollen    Signatures   Electronically signed by : Ashia Alaniz, ; Feb 13 2017 12:28PM EST                       (Author)

## 2018-01-09 NOTE — MISCELLANEOUS
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   offered pt a earlier appt, but she has on Monday and will just wait        Active Problems    1  Depression with anxiety (300 4) (F41 8)   2  Drug use (305 90) (F19 90)   3  Exposure to blood or body fluid (V15 85) (Z77 21)   4  Headache (784 0) (R51)   5  High-risk pregnancy in first trimester (V23 9) (O09 91)    Current Meds   1  Benadryl TABS; Therapy: (Recorded:29Jun2016) to Recorded   2  Colace 100 MG Oral Capsule; Therapy: (Recorded:30Jun2016) to Recorded   3  Dulcolax Milk of Magnesia 400 MG/5ML Oral Suspension; Therapy: (Recorded:30Jun2016) to Recorded   4  Emetrol SOLN;   Therapy: (Recorded:30Jun2016) to Recorded   5  Metamucil Smooth Texture 28 3 % Oral Powder; Therapy: (Recorded:30Jun2016) to Recorded   6  Mylanta Maximum Strength 400-400-40 MG/5ML SUSP; Therapy: (Recorded:00Pdg0990) to Recorded   7  Prenatal TABS; Therapy: (Chris Garcia) to Recorded   8  Subutex 8 MG SUBL (Buprenorphine HCl); Therapy: (Recorded:30Jun2016) to Recorded   9  Tylenol Extra Strength 500 MG Oral Tablet; Therapy: (Recorded:30Jun2016) to Recorded   10  Zantac 75 75 MG Oral Tablet; Therapy: (Recorded:30Jun2016) to Recorded    Allergies    1  No Known Drug Allergies    2  Animal dander - Cats   3  Animal dander - Dogs   4   Pollen    Signatures   Electronically signed by : Slava Gee, ; Jul 20 2016  2:02PM EST                       (Author)

## 2018-01-10 NOTE — MISCELLANEOUS
Message   Date: 22 Aug 2017 3:25 PM EST, Recorded By: Dylan Alford For: Nori Valles   Caller: Thelma Eugene, Self   Phone: [phone] (Home), [phone] (Work)   Reason: Other   Patient called to report taking progesterone only pill, still breastfeeding, not getting menses  Patient is sexually active but started BCP with MP  Informed patient WNL  Continue pill as directed  Active Problems    1  Chronic hepatitis C affecting antepartum care of mother in second trimester   (647 63,070 54) (O98 412,B18 2)   2  Depression with anxiety (300 4) (F41 8)   3  Encounter for initial prescription of contraceptive pills (V25 01) (Z30 011)   4  Encounter for postpartum visit (V24 2) (Z39 2)   5  Epigastric pain (789 06) (R10 13)   6  Group C streptococcal infection (041 03) (A49 1)   7  Irritable bowel syndrome (IBS) (564 1) (K58 9)    Current Meds   1  Cranberry TABS; Therapy: (Recorded:80Wws3966) to Recorded   2  Norethindrone 0 35 MG Oral Tablet; Take 1 tablet daily; Therapy: 21Jun2017 to (Whitley Pean)  Requested for: 21Jun2017; Last   AR:16VJM0318 Ordered   3  Nystatin 248988 UNIT/GM External Cream; APPLY 2-3 TIMES DAILY TO AFFECTED   AREA(S); Therapy: 83IPE5411 to (Evaluate:98Znr6497)  Requested for: 20Jun2017; Last   Rx:20Jun2017 Ordered   4  Penicillin V Potassium 500 MG Oral Tablet; TAKE 1 TABLET 3 TIMES DAILY; Therapy: 93XIC8761 to (Evaluate:92Vpi2131); Last Rx:72Sfs2860 Ordered   5  Prenate Essential 29-0 6-0 4-340 MG Oral Capsule; One daily; Therapy: 73YFO3642 to (Last Rx:03Nov2016)  Requested for: 11PIR7620 Ordered   6  Probiotic CAPS; Therapy: (Recorded:19Jan2017) to Recorded   7  Tylenol Extra Strength 500 MG Oral Tablet; Therapy: (Recorded:30Jun2016) to Recorded    Allergies    1  No Known Drug Allergies    2  Animal dander - Cats   3  Animal dander - Dogs   4  Latex   5   Pollen    Signatures   Electronically signed by : Dylon Jauregui, ; Aug 22 2017  3:27PM EST                       (Author)

## 2018-01-10 NOTE — PROGRESS NOTES
Active Problems    1  ASCUS with positive high risk HPV cervical (795 01,795 05) (R87 610,R87 810)   2  Chronic hepatitis C affecting antepartum care of mother in second trimester   (647 63,070 54) (O98 412,B18 2)   3  Condyloma acuminata (078 11) (A63 0)   4  Depression with anxiety (300 4) (F41 8)   5  Drug use (305 90) (F19 90)   6  Hepatitis C virus (070 70) (B19 20)   7  Hepatitis C, acute (070 51) (B17 10)   8  History of drug abuse (305 93) (Z87 898)   9  History of heroin abuse (305 53) (Z87 898)   10  History of recurrent UTI (urinary tract infection) (V13 02) (Z87 440)   11  Pregnancy, obstetrical care (V22 1) (Z34 90)   12  Rh negative status during pregnancy, unspecified trimester (V23 89) (O09 899)   13  Suspected damage to fetus from drugs, affecting management of mother, antepartum,    not applicable or unspecified fetus (655 53) (O35 5XX0)    Current Meds    1  Prenate Essential 29-0 6-0 4-340 MG Oral Capsule; One daily; Therapy: 35SMN0931 to (Last Rx:03Nov2016)  Requested for: 35TAK4880 Ordered    2  Cranberry TABS; Therapy: (Recorded:88Nme5028) to Recorded   3  Probiotic CAPS; Therapy: (Recorded:19Jan2017) to Recorded   4  Tylenol Extra Strength 500 MG Oral Tablet; Therapy: (Recorded:30Jun2016) to Recorded    Allergies    1  No Known Drug Allergies    2  Animal dander - Cats   3  Animal dander - Dogs   4  Latex   5  Pollen    Results/Data  [id number] Abdominal Ultrasound OB Mark Monteiro:   Procedure: 18053- Ultrasound pregnant uterus real time with image documentation, limited one or more fetuses  The study was done today in the office  Indication: EDC gestational age 44w3d with an LORIE of 1/29/2017 weeks  Exam indication: post dates  Findings:   Amniotic fluid volume: 0 0 + 39 1 + 11 8 + 34 3 = 85 2mm  Fetal heart beat: 136bpm    Fetal position: vertex  Impression: Appropriate NIKOLAY for fetal age        Signatures   Electronically signed by : Tejinder Salazar, ; Feb 1 2017  8:44AM EST (Author)    Electronically signed by : SARAH Johns ; Feb 1 2017  9:00AM EST

## 2018-01-10 NOTE — MISCELLANEOUS
Message   Date: 31 Jan 2017 8:39 AM EST, Recorded By: Juarez Patel For: Rachel Murrieta: Kerrie Aguirre, Self   Phone: [phone] (Home), [phone] (Work)   Reason: Medical Complaint   OB patient called c/o possible contx since 0400, possible ROM  Sent to L&D  Dr Bessy Zheng informed via text  Active Problems    1  ASCUS with positive high risk HPV cervical (795 01,795 05) (R87 610,R87 810)   2  Chronic hepatitis C affecting antepartum care of mother in second trimester   (647 63,070 54) (O98 412,B18 2)   3  Condyloma acuminata (078 11) (A63 0)   4  Depression with anxiety (300 4) (F41 8)   5  Drug use (305 90) (F19 90)   6  Hepatitis C virus (070 70) (B19 20)   7  Hepatitis C, acute (070 51) (B17 10)   8  History of drug abuse (305 93) (Z87 898)   9  History of heroin abuse (305 53) (Z87 898)   10  History of recurrent UTI (urinary tract infection) (V13 02) (Z87 440)   11  Pregnancy, obstetrical care (V22 1) (Z34 90)   12  Rh negative status during pregnancy, unspecified trimester (V23 89) (O09 899)   13  Suspected damage to fetus from drugs, affecting management of mother, antepartum,    not applicable or unspecified fetus (655 53) (O35 5XX0)    Current Meds   1  Cranberry TABS; Therapy: (Recorded:35Xom6142) to Recorded   2  Prenate Essential 29-0 6-0 4-340 MG Oral Capsule; One daily; Therapy: 03BMG6695 to (Last Rx:03Nov2016)  Requested for: 57VYB4638 Ordered   3  Probiotic CAPS; Therapy: (Recorded:19Jan2017) to Recorded   4  Tylenol Extra Strength 500 MG Oral Tablet; Therapy: (Recorded:30Jun2016) to Recorded    Allergies    1  No Known Drug Allergies    2  Animal dander - Cats   3  Animal dander - Dogs   4  Latex   5   Pollen    Signatures   Electronically signed by : Kaylee Salmeron, ; Jan 31 2017  8:43AM EST                       (Author)

## 2018-01-10 NOTE — PROGRESS NOTES
OCT 31 2016         RE: Arielle Diver                             To: SARAH Lanier    MR#: [id number]                                   [address]     : [facility], [address] [place] [address]: [id number]                             Fax: [phone]   (Exam #: [id number])      The LMP of this 23year old,  G1, P0-0-0-0 patient was 2016, giving   her an LORIE of 2017 and a current gestational age of 32 weeks 1 day   by dates  A sonographic examination was performed on OCT 31 2016 using   real time equipment  The ultrasound examination was performed using   abdominal & vaginal techniques  The patient has a BMI of 29 5  Her blood   pressure today was 109/73  Earliest ultrasound found in her record: 2016   9w2d  2016 LORIE      Cardiac motion was observed at 151 bpm       INDICATIONS      smoker   fetal anatomical survey   second trimester bleeding   marijuana use in pregnancy   heroin abuse   maternal hepatitis C      Exam Types      LEVEL II   Transvaginal      RESULTS      Fetus # 1 of 1   Vertex presentation   Fetal growth appeared normal   Placenta Location = Left lateral   No placenta previa   Placenta Grade = I      MEASUREMENTS (* Included In Average GA)      AC              22 6 cm        26 weeks 6 days* (43%)   BPD              7 4 cm        29 weeks 5 days* (>95%)   HC              25 6 cm        27 weeks 4 days* (50%)   Femur            5 4 cm        28 weeks 4 days* (71%)      Humerus          4 9 cm        28 weeks 4 days  (74%)      Cerebellum       3 4 cm        30 weeks 0 days   Biorbit          4 4 cm        27 weeks 4 days   CisternaMagna    8 0 mm      HC/AC           1 13   FL/AC           0 24   FL/BPD          0 73   Ceph Index      0 84   EFW (Ac/Fl/Hc)  1105 grams - 2 lbs 7 oz                 (57%)      THE AVERAGE GESTATIONAL AGE is 28 weeks 1 day +/- 14 days        AMNIOTIC FLUID      Q1: 5 7      Q2: 6 4 Q3: 6 4      Q4: 3 0   NIKOLAY Total = 21 5 cm   Amniotic Fluid: Normal      CERVICAL EVALUATION      SUPINE      Cervical Length: 3 80 cm      OTHER TEST RESULTS           Funneling?: No             Dynamic Changes?: No        Resp  To TFP?: No      ANATOMY      Head                                    Normal   Face/Neck                               Normal   Th  Cav  Normal   Heart                                   Normal   Abd  Cav  Normal   Stomach                                 Normal   Right Kidney                            Normal   Left Kidney                             Normal   Bladder                                 Normal   Abd  Wall                               Normal   Spine                                   Normal   Extrems                                 Normal   Genitalia                               Normal   Placenta                                Normal   Umbl  Cord                              Normal   Uterus                                  Normal   PCI                                     Normal      ANATOMY DETAILS      Visualized Appearing Sonographically Normal:   HEAD: (Calvarium, BPD Level, Cavum, Lateral Ventricles, Choroid Plexus,   Cerebellum, Cisterna Magna);    FACE/NECK: (Neck, Nuchal Fold, Profile,   Orbits, Nose/Lips, Palate, Face);    TH  CAV  : (Lungs, Diaphragm); HEART: (Four Chamber View, Proximal Left Outflow, Proximal Right Outflow,   3VV, 3 Vessel Trachea, Short Axis of Greater Vessels, Ductal Arch, Aortic   Arch, Interventricular Septum, Interatrial Septum, IVC, SVC, Cardiac Axis,   Cardiac Position);    ABD  CAV : (Liver, Gall Bladder);    STOMACH, RIGHT   KIDNEY, LEFT KIDNEY, BLADDER, ABD   WALL, SPINE: (Cervical Spine, Thoracic   Spine, Lumbar Spine, Sacrum);    EXTREMS: (Lt Humerus, Rt Humerus, Lt   Forearm, Rt Forearm, Lt Hand, Rt Hand, Lt Femur, Rt Femur, Lt Low Leg, Rt   Low Leg, Lt Foot, Rt Foot); GENITALIA, PLACENTA, UMBL  CORD, UTERUS, PCI      ADNEXA      The left ovary was not visualized  The right ovary was not visualized  IMPRESSION      Nobles IUP   28 weeks and 1 day by this ultrasound  (LORIE=JAN 22 2017)   Vertex presentation   Fetal growth appeared normal   Normal anatomy survey   Regular fetal heart rate of 151 bpm   Left lateral placenta   No placenta previa      GENERAL COMMENT      OFFICE VISIT      On exam today the patient appears well, in no acute distress, and denies   any complaints  Her abdomen is non-tender  The patient had a history of   a single episode of vaginal bleeding when she was in [place]  For some   reason she did not receive RhoGAM at that time  She is O-  Thankfully,   she has not appear to develop any antibiotics based upon the latest blood   work  She is going to receive RhoGAM at her next prenatal visit  The   patient also has hepatitis C with a viral load from [place] of 3 2 x10(6)   or 6 2 log IU/mL  The patient had a first trimester portion of her Sequential Screening at   our Center  She then moved to [place] and did not complete the second   trimester portion of her blood work  Her risk for trisomy 21 before   screening was 1: 790, after screening her risk is 1: 10,000; her risk for   Trisomy 25 before screening was 1: 2700, after screening her risk is 1:   10,000  The fetal anatomic survey is complete  There is no sonographic evidence   of fetal abnormalities at this time  Good fetal movement and tone are   seen  The amniotic fluid volume appears normal   The placenta is left   lateral and it appears sonographically normal   A transvaginal ultrasound   was performed to assess the cervix, which was not seen well   transabdominally  The cervical length was 3 8 centimeters, which is   normal for the current gestational age  There was no significant   funneling or dynamic changes appreciated   The patient was informed of   today's findings and all of her questions were answered  The limitations   of ultrasound were reviewed with the patient, which she appears to   understand  We reviewed the risks with hepatitis C in pregnancy  In general, the risk   of maternal to child transmission is low with hepatitis C  in general   between 4-9% depending on studies  The risk appears to be increased in   this patient's with high viremia, and in those patients with concomitant   HIV, which the patient does not have  The patient's viral load is   moderate in most  Studies  In general, we recommend avoiding invasive   procedures such as amniocentesis and fetal scalp electrodes unless   absolutely necessary despite the lack of good evidence that suggests a   significant increased risk of maternal to child transmission  Outside of   avoiding invasive procedures, no significant change in obstetrical   practice needs to occur   section is recommended only for   traditional obstetrical reasons and not to avoid  transmission of   Hepatitis C  The patient can breastfeed as long as she does not have   cracked bleeding nipples  The patient has been clean for 4 months now  She has also quit smoking as   well  Recommend further scans as clinically indicated  Precautions were   reviewed  Please note, in addition to the time spent discussing the results of the   ultrasound, I spent approximately 10 minutes of face-to-face time with the   patient, greater than 50% of which was spent in counseling and the   coordination of care for this patient  Thank you very much for allowing us to participate in the care of this   very nice patient  Should you have any questions, please do not hesitate   to contact our office  SHAKILA Cook M D     Electronically signed 10/31/16 18:18

## 2018-01-11 NOTE — RESULT NOTES
Discussion/Summary   Liver enzymes are elevated (but not dangerously elevated) most likely from her hepatitis C  She should followup in the office and we will discuss further  Verified Results  (1) HEPATIC FUNCTION PANEL [id number] 12:27PM Alix Jessa Order Number: [id number]     Test Name Result Flag Reference   ALBUMIN 3 8 g/dL  3 5-5 0   - Patient Instructions:  This is a non fasting blood test  Please drink two glasses of water the morning of test    ALK PHOSPHATAS 115 U/L     ALT (SGPT) 273 U/L H 12-78   AST(SGOT) 128 U/L H 5-45   BILI, DIRECT 0 18 mg/dL  0 00-0 20   BILI, TOTAL 0 53 mg/dL  0 20-1 00   TOTAL PROTEIN 7 0 g/dL  6 4-8 2

## 2018-01-11 NOTE — MISCELLANEOUS
Message  Return to work or school:   Anh Chu is under my professional care  She was seen in my office on  11/23/2016 her appt was scheduled for 10:30 am and was seen by the doctor at 11:00am      Dr Amaya Del Valle  Signatures   Electronically signed by :  Jhonnie Fothergill, ; Nov 23 2016 11:16AM EST                       (Author)

## 2018-01-11 NOTE — MISCELLANEOUS
Message   Date: 14 Dec 2016 11:25 AM EST, Recorded By: Scar Gutiérrez For: Virgie Cardenas, Self   Phone: [phone] (Home), [phone] (Work)   Reason: Medical Complaint   pt said that she is nervous because the baby had super bad hiccups and it never stopped    pt is worried about the cord    pt will have an U/S and appt with the dr         Active Problems    1  ASCUS with positive high risk HPV cervical (795 01,795 05) (R87 610,R87 810)   2  Chronic hepatitis C affecting antepartum care of mother in second trimester   (647 63,070 54) (O98 412,B18 2)   3  Condyloma acuminata (078 11) (A63 0)   4  Depression with anxiety (300 4) (F41 8)   5  Drug use (305 90) (F19 90)   6  Exposure to parvovirus (V01 79) (Z20 828)   7  Hepatitis C virus (070 70) (B19 20)   8  Hepatitis C, acute (070 51) (B17 10)   9  History of drug abuse (305 93) (Z87 898)   10  History of recurrent UTI (urinary tract infection) (V13 02) (Z87 440)   11  History of UTI (V13 02) (Z87 440)   12  Pregnancy, obstetrical care (V22 1) (Z34 90)   13  Rh negative status during pregnancy, unspecified trimester (V23 89) (O09 899)   14  Suspected damage to fetus from drugs, affecting management of mother, antepartum,    not applicable or unspecified fetus (655 53) (O35 5XX0)   13  Urinary tract infection (599 0) (N39 0)    Current Meds   1  Nitrofurantoin Macrocrystal 100 MG Oral Capsule; TAKE 1 CAPSULE EVERY 12 HOURS   DAILY; Therapy: 13WMP2926 to (Complete:27Nkb0465)  Requested for: 59QDJ9128; Last   Rx:87Hij4220 Ordered   2  Prenate Essential 29-0 6-0 4-340 MG Oral Capsule; One daily; Therapy: 67ZTA7331 to (Last Rx:03Nov2016)  Requested for: 50IKE9881 Ordered   3  Tylenol Extra Strength 500 MG Oral Tablet; Therapy: (Recorded:30Jun2016) to Recorded    Allergies    1  No Known Drug Allergies    2  Animal dander - Cats   3  Animal dander - Dogs   4  Latex   5   Pollen    Signatures   Electronically signed by : Cynthia Kirk, ; Dec 14 2016 11:27AM EST                       (Author)

## 2018-01-11 NOTE — MISCELLANEOUS
Message   Date: 24 Oct 2016 2:53 PM EST, Recorded By: Alfonso Burkett For: Jamison Broges, Self   Phone: [phone] (Home), [phone] (Work)   Reason: Medical Complaint   pt has a yellow watery discharge    pt will come in sooner for her OB visit    Active Problems    1  Depression with anxiety (300 4) (F41 8)   2  Drug use (305 90) (F19 90)   3  Exposure to blood or body fluid (V15 85) (Z77 21)   4  Headache (784 0) (R51)   5  High-risk pregnancy in first trimester (V23 9) (O09 91)   6  Pregnancy, obstetrical care (V22 1) (Z34 90)    Current Meds   1  Benadryl TABS; Therapy: (Recorded:29Jun2016) to Recorded   2  Emetrol SOLN;   Therapy: (Recorded:30Jun2016) to Recorded   3  Prenatal TABS; Therapy: (Jerome Santiago) to Recorded   4  Subutex 8 MG SUBL (Buprenorphine HCl); Therapy: (Recorded:30Jun2016) to Recorded   5  Tylenol Extra Strength 500 MG Oral Tablet; Therapy: (Recorded:30Jun2016) to Recorded    Allergies    1  No Known Drug Allergies    2  Animal dander - Cats   3  Animal dander - Dogs   4  Latex   5   Pollen    Signatures   Electronically signed by : Sun Newman, ; Oct 24 2016  2:54PM EST                       (Author)

## 2018-01-11 NOTE — MISCELLANEOUS
Message   Recorded as Task   Date: 11/09/2016 07:29 AM, Created By: Kayla Triplett   Task Name: Go to Result   Assigned To: Kristi Burnette   Regarding Patient: Ian An, Status: Active   CommentMarnatanael Kearney - 09 Nov 2016 7:29 AM     TASK CREATED  Parvovirus IgG positive with IgM negative, consistent with prior exposure to fifth disease  No concerns for the pregnancy given this result        Active Problems    1  ASCUS with positive high risk HPV cervical (795 01,795 05) (R87 610,R87 810)   2  Chronic hepatitis C affecting antepartum care of mother in second trimester   (647 63,070 54) (O98 412,B18 2)   3  Condyloma acuminata (078 11) (A63 0)   4  Depression with anxiety (300 4) (F41 8)   5  Drug use (305 90) (F19 90)   6  Exposure to parvovirus (V01 79) (Z20 828)   7  Hepatitis C virus (070 70) (B19 20)   8  Hepatitis C, acute (070 51) (B17 10)   9  History of drug abuse (305 93) (Z87 898)   10  Pregnancy, obstetrical care (V22 1) (Z34 90)   11  Rh negative status during pregnancy, unspecified trimester (V23 89) (O09 899)   12  Suspected damage to fetus from drugs, affecting management of mother, antepartum,    not applicable or unspecified fetus (655 53) (O35 5XX0)    Current Meds   1  Prenate Essential 29-0 6-0 4-340 MG Oral Capsule; One daily; Therapy: 63OIT4460 to (Last Rx:03Nov2016)  Requested for: 23ZOT0790 Ordered   2  Tylenol Extra Strength 500 MG Oral Tablet; Therapy: (Recorded:30Jun2016) to Recorded    Allergies    1  No Known Drug Allergies    2  Animal dander - Cats   3  Animal dander - Dogs   4  Latex   5   Pollen    Signatures   Electronically signed by : Arina Tse, ; Nov 9 2016  9:21AM EST                       (Author)

## 2018-01-11 NOTE — PROGRESS NOTES
2016         RE: Laura Leroy                             To: SARAH Gill    MR#: [id number]                                   [address]     : [address], [address]: [id number]                             Fax: [phone]   (Exam #: [id number])      The LMP of this 23year old,  G1, P0-0-0-0 patient was 2016, giving   her an LORIE of 2017 and a current gestational age of 17 weeks 1 day   by dates  A sonographic examination was performed on 2016 using   real time equipment  The ultrasound examination was performed using   abdominal technique  The patient has a BMI of 24 8  Her blood pressure   today was 110/74  Earliest ultrasound found in her record: 2016   9w2d  2016 LORIE   Multiple longitudinal and transverse sections revealed a brown   intrauterine pregnancy with the fetus in variable presentation  The   placenta is anterior in implantation, grade 0 in appearance, and there is   no placenta previa  Cardiac motion was observed at 155 bpm       INDICATIONS      smoker   history of drug dependence   first trimester genetic screening      Exam Types      Level I      RESULTS      Fetus # 1 of 1   Variable presentation   Fetal growth appeared normal      MEASUREMENTS (* Included In Average GA)      CRL              7 0 cm        13 weeks 0 days*   Nuchal Trans    2 00 mm      THE AVERAGE GESTATIONAL AGE is 13 weeks 0 days +/- 7 days  UTERINE ARTERIES                                  S/D   PI    RI    NOTCH       Left Uterine Artery        2 51  1 07  0 60       Right Uterine Artery       5 60  2 25  0 82      ANATOMY COMMENTS      Anatomic detail is limited at this gestational age  The yolk sac was not   noted  The fetal cranium appeared normal in shape and the nuchal   translucency was normal in size (2 0mm)  The nasal bone appears to be   present    The intracranial anatomy was unremarkable  Evaluation of the   spine revealed no obvious evidence for a neural tube defect  Anatomy of   the fetal thorax appeared within normal limits  The cardiac rhythm was   regular  Within the abdomen, stomach & bladder were visualized and the   abdominal wall appeared intact  A three vessel cord appears to be present  Active movement of the fetal body & extremities was seen  There is no   suspicion of a subchorionic bleed  The placental cord insertion was   normal    The uterine artery Dopplers are normal for this gestational age  There is no suspicion of a uterine myoma  Free fluid is not seen in the   posterior cul-de-sac  AMNIOTIC FLUID         Largest Vertical Pocket = 4 2 cm   Amniotic Fluid: Normal      IMPRESSION      [facility] IUP   13 weeks and 0 days by this ultrasound  (LORIE=2017)   Variable presentation   Fetal growth appeared normal   Regular fetal heart rate of 155 bpm   Anterior placenta   No placenta previa      GENERAL COMMENT         I had the pleasure of seeing [facility] for a   sequential screen  She is a cigarette smoker and has a history of heroin   use  She is currently cleared of her heroin use  She also has a history of   asthma  She denies any medical or surgical problems at this stage  Today's ultrasound showed a viable fetus in a variable presentation  The   amniotic fluid and overall fetal growth appeared normal  The placenta is    anterior in location and there is no evidence of a placenta previa  There   were no obvious anomalies seen in the fetus today  Down syndrome screening   was reassuring as the nuchal translucency was normal in size and the nasal   bone was present  The uterine artery Doppler flow studies were normal   bilaterally  There were no subchorionic hematomas or uterine myomas  Note that the patient did go on to complete the bloodwork portion of   today's visit  Today's ultrasound was overall reassuring  The nuchal   translucency measured 2 0 mm in an enlarged midsagittal plane and the   nasal bone was present  There were no obvious birth defects  The uterine   artery Doppler flow studies are normal  The fetus appears to be growing   well as today's ultrasound correlates well with her due date  She is going   to be moving to [place] in the near future and I wished her well for safe   and healthy delivery  Total face-to-face time with the patient, excluding   ultrasound time was 10 minutes with more than 50% of the time devoted to   counseling and coordination of care  SHAKILA Jaime M D     Maternal-Fetal Medicine   Electronically signed 07/25/16 13:10

## 2018-01-11 NOTE — MISCELLANEOUS
Provider Comments  Provider Comments:   CALLED PT TO VERIFY IF SHE WAS COMING TO HER APPOINTMENT THIS MORNING AND PT STATED SHE   WAS NOT COMING DESPITE BEING OFFERED MULTIPLE OTHER APPOINTMENT TIMES, SHE STATED SHE WOULD KEEP HER SCHEDULED APPOINTMENT FOR NEXT WEEK,  DR GAITAN WAS INFORMED  Signatures   Electronically signed by :  Christa Paz, ; Jan 4 2017 10:23AM EST                       (Author)

## 2018-01-12 NOTE — MISCELLANEOUS
Message  Message Free Text Note Form: Patient called with complaints of nausea and some discomfort in the epigastric area  She was concerned about some type of food poisoning as she ate salmon yesterday  However, she notes no fevers or diarrhea or abdominal cramping  Likely, she most likely has some type of gastroesophageal reflux symptoms  Recommend Tums or Rolaids to see if this will help  Of note, the patient denies contractions and notes good fetal movement        Signatures   Electronically signed by : SARAH Ryder ; Nov 20 2016  1:54PM EST                       (Author)

## 2018-01-12 NOTE — MISCELLANEOUS
Message   Date: 15 May 2017 3:44 PM EST, Recorded By: Juarez Patel For: Agustin Chan   Caller: Leon Franklin   Phone: [phone] (Home), [phone] (Work)   Reason: Renew Medication   Patient called for refill of Nystatin cream   Escript sent  Active Problems    1  Chronic hepatitis C affecting antepartum care of mother in second trimester   (647 63,070 54) (O98 412,B18 2)   2  Depression with anxiety (300 4) (F41 8)   3  Encounter for postpartum visit (V24 2) (Z39 2)   4  Group C streptococcal infection (041 03) (A49 1)   5  Hepatitis C virus infection (070 70) (B19 20)    Current Meds   1  Cranberry TABS; Therapy: (Recorded:71Fac9831) to Recorded   2  Nystatin 881735 UNIT/GM External Cream; APPLY 2-3 TIMES DAILY TO AFFECTED   AREA(S); Therapy: 74CYD9140 to (Evaluate:83Lse0769)  Requested for: 94IUO4143; Last   Rx:04Apr2017 Ordered   3  Penicillin V Potassium 500 MG Oral Tablet; TAKE 1 TABLET 3 TIMES DAILY; Therapy: 52UIJ7688 to (Evaluate:63Sof0998); Last Rx:60Moj6325 Ordered   4  Prenate Essential 29-0 6-0 4-340 MG Oral Capsule; One daily; Therapy: 77PXZ0707 to (Last Rx:03Nov2016)  Requested for: 06UBA6532 Ordered   5  Probiotic CAPS; Therapy: (Recorded:19Jan2017) to Recorded   6  Tylenol Extra Strength 500 MG Oral Tablet; Therapy: (Recorded:30Jun2016) to Recorded    Allergies    1  No Known Drug Allergies    2  Animal dander - Cats   3  Animal dander - Dogs   4  Latex   5   Pollen    Plan  PMH: History of candidiasis    · Nystatin 970303 UNIT/GM External Cream; APPLY 2-3 TIMES DAILY TO  AFFECTED AREA(S)    Signatures   Electronically signed by : Kaylee Salmeron, ; May 15 2017  3:45PM EST                       (Author)

## 2018-01-12 NOTE — MISCELLANEOUS
Message   Recorded as Task   Date: 11/23/2016 11:20 AM, Created By: Grant Soto   Task Name: Miscellaneous   Assigned To: [facility]   Regarding Patient: Barbara Fields, Status: In Progress   CommentRosana Webb - 23 Nov 2016 11:20 AM     TASK CREATED  Patient was in [place] until around 10/19/16 in [facility] area  Was bitten by mosquitoes  Interested in [place] testing  Need to contact the lab about how to do so  Thanks   [facility] [place] - 25 Nov 2016 9:35 AM     TASK REPLIED TO: Previously Assigned To [facility] - 11/23/2016 11:20 AM  TASK CREATED  Patient was in [place] until around 10/19/16 in [facility] area  Was bitten by mosquitoes  Interested in [place] testing  Need to contact the lab about how to do so  Thanks   Zane Zurita - 28 Nov 2016 8:15 AM     TASK REPLIED TO: Previously Assigned To Zane Zurita  I need the nursing team to call the lab and find out the mechanism for arranging for Zika testing  Thanks  Then, we have to contact the patient and arrange for such testing if she is interested in doing so  Maya Gill - 28 Nov 2016 10:55 AM     TASK IN PROGRESS   [facility] - 28 Nov 2016 1:03 PM     TASK EDITED  Patient does not meet the requirements for this to be covered under insurance  Dr Jacqui Cage aware  I spoke to patient, she is aware  Active Problems    1  ASCUS with positive high risk HPV cervical (795 01,795 05) (R87 610,R87 810)   2  Chronic hepatitis C affecting antepartum care of mother in second trimester   (647 63,070 54) (O98 412,B18 2)   3  Condyloma acuminata (078 11) (A63 0)   4  Depression with anxiety (300 4) (F41 8)   5  Drug use (305 90) (F19 90)   6  Exposure to parvovirus (V01 79) (Z20 828)   7  Hepatitis C virus (070 70) (B19 20)   8  Hepatitis C, acute (070 51) (B17 10)   9  History of drug abuse (305 93) (Z87 898)   10  Need for influenza vaccination (V04 81) (Z23)   11  Need for Tdap vaccination (V06 1) (Z23)   12  Pregnancy, obstetrical care (V22 1) (Z34 90)   13  Rh negative status during pregnancy, unspecified trimester (V23 89) (O09 899)   14  Suspected damage to fetus from drugs, affecting management of mother, antepartum,    not applicable or unspecified fetus (655 53) (O35 5XX0)    Current Meds   1  Prenate Essential 29-0 6-0 4-340 MG Oral Capsule; One daily; Therapy: 65FLW9392 to (Last Rx:03Nov2016)  Requested for: 74XEU4243 Ordered   2  Tylenol Extra Strength 500 MG Oral Tablet; Therapy: (Recorded:30Jun2016) to Recorded    Allergies    1  No Known Drug Allergies    2  Animal dander - Cats   3  Animal dander - Dogs   4  Latex   5  Pollen    Signatures   Electronically signed by :  Gustavo Ordoñez, ; Nov 28 2016  1:04PM EST                       (Author)

## 2018-01-12 NOTE — PROGRESS NOTES
Assessment    1  Chronic hepatitis C affecting antepartum care of mother in second trimester   (647 63,070 54) (O98 412,B18 2)   2  History of drug abuse (305 93) (Z87 898)   3  Chronic constipation (564 00) (K59 09)    Plan  Chronic constipation, Chronic hepatitis C affecting antepartum care of mother in second  trimester    · Follow-up visit in 1 year Evaluation and Treatment  Follow-up  Status: Hold For -  Scheduling  Requested for: 39WSQ5530   Ordered; For: Chronic constipation, Chronic hepatitis C affecting antepartum care of mother in second trimester; Ordered By: Deven Rios Performed:  Due: 85SDX1453  Chronic hepatitis C affecting antepartum care of mother in second trimester    · (1) HEPATIC FUNCTION PANEL; Status:Active; Requested LWY:81UXP0243;    Perform:[facility]; BBB:19CCF5741; Ordered; For:Chronic hepatitis C affecting antepartum care of mother in second trimester; Ordered By:Colin Shirley;    Discussion/Summary  Discussion Summary:   She appears to have chronic hepatitis C infection  I spoke to her about the evaluation and treatment of hepatitis C infection  She prefers to defer treatment until after she is done breastfeeding and I think this is reasonable  I will check her hepatic function panel to see if there is been an abrupt change in the activity of the hepatitis C  Since she wants to begin treatment after she is done breast-feeding in about one year, I will defer rechecking her viral load, genotype, fibrosis score, and other lab testing since it will have to be repeated in one year anyway at the time she submits for treatment  She has chronic constipation and I've asked her to try taking MiraLAX twice a day chronically for the constipation and to let me know if her symptoms do not improve  She should have an elective colonoscopy because of her constipation at her convenience  Since she does not have any alarm symptoms this could wait until after she is done breast-feeding  Chief Complaint  Chief Complaint Free Text Note Form: Patient follow up   Chief Complaint Chronic Condition [facility]: Patient is here today for follow up of chronic conditions described in HPI  History of Present Illness  HPI: She presents for followup because of her diagnosis of hepatitis C infection  She said she had exposure through [facility] about 10 months ago and then had lab testing about 7 months ago that was positive  She does not know her genotype  She is currently asymptomatic and she denies fatigue, abdominal pain, nausea, vomiting, rash, joint pains, and weight loss  She delivered a healthy baby girl about one month ago and has been breast-feeding without any issues  History Reviewed: The history was obtained today from the patient and I agree with the documented history  Review of Systems  Complete-Female GI Adult:   Constitutional: No fever, no chills, feels well, no tiredness, no recent weight gain or weight loss  Eyes: No complaints of eye pain, no red eyes, no eyesight problems, no discharge, no dry eyes, no itching of eyes  ENT: no complaints of earache, no loss of hearing, no nose bleeds, no nasal discharge, no sore throat, no hoarseness  Cardiovascular: No complaints of slow heart rate, no fast heart rate, no chest pain, no palpitations, no leg claudication, no lower extremity edema  Respiratory: No complaints of shortness of breath, no wheezing, no cough, no SOB on exertion, no orthopnea, no PND  Gastrointestinal: No complaints of abdominal pain, no constipation, no nausea or vomiting, no diarrhea, no bloody stools  Genitourinary: No complaints of dysuria, no incontinence, no pelvic pain, no dysmenorrhea, no vaginal discharge or bleeding  Musculoskeletal: No complaints of arthralgias, no myalgias, no joint swelling or stiffness, no limb pain or swelling  Integumentary: No complaints of skin rash or lesions, no itching, no skin wounds, no breast pain or lump  Neurological: No complaints of headache, no confusion, no convulsions, no numbness, no dizziness or fainting, no tingling, no limb weakness, no difficulty walking  Psychiatric: Not suicidal, no sleep disturbance, no anxiety or depression, no change in personality, no emotional problems  Endocrine: No complaints of proptosis, no hot flashes, no muscle weakness, no deepening of the voice, no feelings of weakness  Hematologic/Lymphatic: No complaints of swollen glands, no swollen glands in the neck, does not bleed easily, does not bruise easily  ROS Reviewed:   ROS reviewed  Active Problems    1  ASCUS with positive high risk HPV cervical (795 01,795 05) (R87 610,R87 810)   2  Candidiasis (112 9) (B37 9)   3  Chronic hepatitis C affecting antepartum care of mother in second trimester   (647 63,070 54) (O98 412,B18 2)   4  Condyloma acuminata (078 11) (A63 0)   5  Depression with anxiety (300 4) (F41 8)   6  Drug use (305 90) (F19 90)   7  Hepatitis C virus (070 70) (B19 20)   8  Hepatitis C, acute (070 51) (B17 10)   9  History of drug abuse (305 93) (Z87 898)   10  History of heroin abuse (305 53) (Z87 898)   11  History of recurrent UTI (urinary tract infection) (V13 02) (Z87 440)   12  Pregnancy, obstetrical care (V22 1) (Z34 90)   13  Rh negative status during pregnancy, unspecified trimester (V23 89) (O09 899)   14  Suspected damage to fetus from drugs, affecting management of mother, antepartum,    not applicable or unspecified fetus (655 53) (O35 5XX0)    Past Medical History    1  History of Anxiety (300 00) (F41 9)   2  History of Anxiety during pregnancy in third trimester, antepartum (648 43,300 00)   (O99 343,F41 9)   3  History of Asthma (493 90) (J45 909)   4  History of Bacterial vaginosis (616 10,041 9) (N76 0,B96 89)   5  History of Denial Of Any Significant Medical History   6  History of Dysuria (788 1) (R30 0)   7  History of Exposure to blood or body fluid (V15 85) (Z77 21)   8  History of Exposure to parvovirus (V01 79) (Z20 828)   9  History of  1 (V22 0) (Z34 00)   10  History of High-risk pregnancy in first trimester (V23 9) (O09 91)   11  History of acute pharyngitis (V12 69) (Z87 09)   12  History of breast lump (V13 89) (Z87 898)   13  History of candidal vulvovaginitis (V13 29) (Z86 19)   14  History of galactorrhea (V12 29) (Z87 59)   15  History of headache (V13 89) (Z87 898)   16  History of pregnancy (V13 29)   17  History of urinary tract infection (V13 02) (Z87 440)   18  History of vaginal discharge (V13 29) (Z87 42)   19  History of vaginal discharge (V13 29) (Z87 42)   20  History of Intravenous drug abuse, episodic (305 92) (F19 10)   21  History of Pregnancy at early stage (V22 2) (Z33 1)   22  History of Urinary Tract Infection (V13 02)   23  History of Vaginitis due to Candida albicans (112 1) (B37 3)  Active Problems And Past Medical History Reviewed: The active problems and past medical history were reviewed and updated today  Surgical History    1  History of Knee Surgery   2  History of Oral Surgery  Surgical History Reviewed: The surgical history was reviewed and updated today  Family History  Brother    1  Family history of Asthma (V17 5)  Family History    2  Family history of Diabetes Mellitus (V18 0)   3  Family history of No Significant Family History  Family History Reviewed: The family history was reviewed and updated today  Social History    · Denied: History of Alcohol   · Birth Control Method - Oral Contraceptives   · Caffeine Use   · Drug use (305 90) (F19 90)   · Drug Use (305 90)   · Former smoker (V15 82) (T93 528)   · No alcohol use   · [employer]   · Uses safety equipment  Social History Reviewed: The social history was reviewed and updated today  Current Meds   1  Cranberry TABS; Therapy: (Recorded:91Dla1987) to Recorded   2   Nystatin 958363 UNIT/GM External Cream; APPLY 2-3 TIMES DAILY TO AFFECTED AREA(S); Therapy: 92LKT3803 to (Evaluate:10Mar2017)  Requested for: 70LEK2661; Last   Rx:03Mar2017 Ordered   3  Prenate Essential 29-0 6-0 4-340 MG Oral Capsule; One daily; Therapy: 42NWF2561 to (Last Rx:03Nov2016)  Requested for: 99RRZ7284 Ordered   4  Probiotic CAPS; Therapy: (Recorded:19Jan2017) to Recorded   5  Tylenol Extra Strength 500 MG Oral Tablet; Therapy: (Recorded:30Jun2016) to Recorded  Medication List Reviewed: The medication list was reviewed and updated today  Allergies    1  No Known Drug Allergies    2  Animal dander - Cats   3  Animal dander - Dogs   4  Latex   5  Pollen    Vitals  Vital Signs    Recorded: 16STG0147 02:05PM   Temperature 97 3 F, Tympanic   Heart Rate 98   Systolic 825, LUE, Sitting   Diastolic 76, LUE, Sitting   Height 5 ft 5 in   Weight 172 lb 6 0 oz   BMI Calculated 28 68   BSA Calculated 1 86   O2 Saturation 98     Physical Exam    Constitutional   General appearance: No acute distress, well appearing and well nourished  Eyes   Conjunctiva and lids: No swelling, erythema or discharge  Pupils and irises: Equal, round and reactive to light  Ears, Nose, Mouth, and Throat   External inspection of ears and nose: Normal     Nasal mucosa, septum, and turbinates: Normal without edema or erythema  Oropharynx: Normal with no erythema, edema, exudate or lesions  Pulmonary   Respiratory effort: No increased work of breathing or signs of respiratory distress  Auscultation of lungs: Clear to auscultation  Cardiovascular   Auscultation of heart: Normal rate and rhythm, normal S1 and S2, without murmurs  Examination of extremities for edema and/or varicosities: Normal     Abdomen   Abdomen: Non-tender, no masses  Liver and spleen: No hepatomegaly or splenomegaly  Lymphatic   Palpation of lymph nodes in neck: No lymphadenopathy  Musculoskeletal   Gait and station: Normal     Digits and nails: Normal without clubbing or cyanosis  Inspection/palpation of joints, bones, and muscles: Normal     Skin   Skin and subcutaneous tissue: Normal without rashes or lesions  Psychiatric   Orientation to person, place, and time: Normal     Mood and affect: Normal          Future Appointments    Date/Time Provider Specialty Site   03/17/2017 10:15 AM SARAH Chatterjee   Obstetrics/Gynecology [facility]     Signatures   Electronically signed by : Shadi Luis MD; Mar  9 2017  2:37PM EST                       (Author)

## 2018-01-12 NOTE — MISCELLANEOUS
Message   Date: 07 Nov 2016 1:04 PM EST, Recorded By: Thai Hall   Calling For: Rosa Maldonado: Fransisca Gonzalez, Leon   Phone: [phone] (Home), [phone] (Work)   Reason: Medical Complaint   Patient called worried about being possibly exposed to Fifth's Disease from her young brother, although he was not officially diagnosed  Pt is fine and has no sx  Per Dr Mony Vazquez sent patient for B19 Parvovirus, IGG, IGM  Active Problems    1  ASCUS with positive high risk HPV cervical (795 01,795 05) (R87 610,R87 810)   2  Chronic hepatitis C affecting antepartum care of mother in second trimester   (647 63,070 54) (O98 412,B18 2)   3  Condyloma acuminata (078 11) (A63 0)   4  Depression with anxiety (300 4) (F41 8)   5  Drug use (305 90) (F19 90)   6  Exposure to parvovirus (V01 79) (Z20 828)   7  Hepatitis C virus (070 70) (B19 20)   8  Hepatitis C, acute (070 51) (B17 10)   9  History of drug abuse (305 93) (Z87 898)   10  Pregnancy, obstetrical care (V22 1) (Z34 90)   11  Rh negative status during pregnancy, unspecified trimester (V23 89) (O09 899)   12  Suspected damage to fetus from drugs, affecting management of mother, antepartum,    not applicable or unspecified fetus (655 53) (O35 5XX0)    Current Meds   1  Prenate Essential 29-0 6-0 4-340 MG Oral Capsule; One daily; Therapy: 40LZL8557 to (Last Rx:03Nov2016)  Requested for: 14DTM7063 Ordered   2  Tylenol Extra Strength 500 MG Oral Tablet; Therapy: (Recorded:30Jun2016) to Recorded    Allergies    1  No Known Drug Allergies    2  Animal dander - Cats   3  Animal dander - Dogs   4  Latex   5   Pollen    Signatures   Electronically signed by : Chelsea Carlson, ; Nov 7 2016  1:07PM EST                       (Author)

## 2018-01-13 VITALS
BODY MASS INDEX: 33.15 KG/M2 | DIASTOLIC BLOOD PRESSURE: 83 MMHG | WEIGHT: 199 LBS | HEIGHT: 65 IN | SYSTOLIC BLOOD PRESSURE: 118 MMHG

## 2018-01-13 VITALS
DIASTOLIC BLOOD PRESSURE: 80 MMHG | WEIGHT: 169 LBS | BODY MASS INDEX: 28.16 KG/M2 | OXYGEN SATURATION: 98 % | HEART RATE: 97 BPM | HEIGHT: 65 IN | SYSTOLIC BLOOD PRESSURE: 112 MMHG | TEMPERATURE: 98.7 F

## 2018-01-13 NOTE — MISCELLANEOUS
Message   Recorded as Task   Date: 11/20/2017 08:35 AM, Created By: Nigel Cabral   Task Name: Care Coordination   Assigned To: Maya Gill   Regarding Patient: Florina Crooks, Status: Active   Comment:    Maya Gill - 20 Nov 2017 8:35 AM     TASK CREATED  pt knows she has a UTI  Karrie Nissen she wants to know if you can give her macrobid? also pt is on the breastfeeding bc pill    she is done breastfeeding and wants to go on a regular pill    she was never on any other pill before    what should we give her? Karrie Nissen   pt knows that she is due for a yearly exam   Zane Zurita - 20 Nov 2017 12:59 PM     TASK REPLIED TO: Previously Assigned To Zane Zurita  Can treat with Macrobid twice daily for 7 days for presumed UTI  Would recommend she continue the mini pill and then come in for yearly exam and we can review contraception options at that time  Thanks   Maya Gill - 20 Nov 2017 1:24 PM     TASK EDITED  sent script to dr for the macrobid and left message with pt to call and schedule a yearly exam  Active Problems    1  Chronic hepatitis C affecting antepartum care of mother in second trimester   (647 63,070 54) (O98 412,B18 2)   2  Depression with anxiety (300 4) (F41 8)   3  Encounter for initial prescription of contraceptive pills (V25 01) (Z30 011)   4  Encounter for postpartum visit (V24 2) (Z39 2)   5  Epigastric pain (789 06) (R10 13)   6  Group C streptococcal infection (041 03) (A49 1)   7  Irritable bowel syndrome (IBS) (564 1) (K58 9)   8  Urinary tract infection without hematuria, site unspecified (599 0) (N39 0)    Current Meds   1  Cranberry TABS; Therapy: (Recorded:54Xvp8494) to Recorded   2  Norethindrone 0 35 MG Oral Tablet; Take 1 tablet daily; Therapy: 21Jun2017 to (Hayley Ferrell)  Requested for: 21Jun2017; Last   JV:81AFF1518 Ordered   3  Nystatin 748723 UNIT/GM External Cream; APPLY 2-3 TIMES DAILY TO AFFECTED   AREA(S);    Therapy: 11TPO8609 to (Evaluate:58Qcd5299)  Requested for: 20Jun2017; Last   Rx:20Jun2017 Ordered   4  Penicillin V Potassium 500 MG Oral Tablet; TAKE 1 TABLET 3 TIMES DAILY; Therapy: 14MBU8203 to (Evaluate:97Eqv9604); Last Rx:29Apr2017 Ordered   5  Prenate Essential 29-0 6-0 4-340 MG Oral Capsule; One daily; Therapy: 99XPE2633 to (Last Rx:03Nov2016)  Requested for: 54JIV3107 Ordered   6  Probiotic CAPS; Therapy: (Recorded:19Jan2017) to Recorded   7  Tylenol Extra Strength 500 MG Oral Tablet; Therapy: (Recorded:30Jun2016) to Recorded    Allergies    1  No Known Drug Allergies    2  Animal dander - Cats   3  Animal dander - Dogs   4  Latex   5   Pollen    Plan  Urinary tract infection without hematuria, site unspecified    · Nitrofurantoin Monohyd Macro 100 MG Oral Capsule; TAKE 1 CAPSULE EVERY 12  HOURS DAILY    Signatures   Electronically signed by : Naty Horner, ; Nov 20 2017  1:24PM EST                       (Author)

## 2018-01-13 NOTE — MISCELLANEOUS
Message   Recorded as Task   Date: 12/12/2016 09:59 AM, Created By: Ekaterina Clark   Task Name: Go to Result   Assigned To: Ky [place]   Regarding Patient: Marimar Shah, Status: Active   CommentRik Felix - 12 Dec 2016 9:59 AM     TASK CREATED  Urine culture is negative, please inform the patient  Active Problems    1  ASCUS with positive high risk HPV cervical (795 01,795 05) (R87 610,R87 810)   2  Chronic hepatitis C affecting antepartum care of mother in second trimester   (647 63,070 54) (O98 412,B18 2)   3  Condyloma acuminata (078 11) (A63 0)   4  Depression with anxiety (300 4) (F41 8)   5  Drug use (305 90) (F19 90)   6  Exposure to parvovirus (V01 79) (Z20 828)   7  Hepatitis C virus (070 70) (B19 20)   8  Hepatitis C, acute (070 51) (B17 10)   9  History of drug abuse (305 93) (Z87 898)   10  History of recurrent UTI (urinary tract infection) (V13 02) (Z87 440)   11  History of UTI (V13 02) (Z87 440)   12  Pregnancy, obstetrical care (V22 1) (Z34 90)   13  Rh negative status during pregnancy, unspecified trimester (V23 89) (O09 899)   14  Suspected damage to fetus from drugs, affecting management of mother, antepartum,    not applicable or unspecified fetus (655 53) (O35 5XX0)   13  Urinary tract infection (599 0) (N39 0)    Current Meds   1  Nitrofurantoin Macrocrystal 100 MG Oral Capsule; TAKE 1 CAPSULE EVERY 12 HOURS   DAILY; Therapy: 38ICQ6328 to (Complete:99Xnk0930)  Requested for: 37ZBF3185; Last   Rx:21Wxx9236 Ordered   2  Prenate Essential 29-0 6-0 4-340 MG Oral Capsule; One daily; Therapy: 52JFU3647 to (Last Rx:03Nov2016)  Requested for: 34UVA2663 Ordered   3  Tylenol Extra Strength 500 MG Oral Tablet; Therapy: (Recorded:30Jun2016) to Recorded    Allergies    1  No Known Drug Allergies    2  Animal dander - Cats   3  Animal dander - Dogs   4  Latex   5   Pollen    Signatures   Electronically signed by : Cory Minor, ; Dec 12 2016  3:56PM EST (Author)

## 2018-01-13 NOTE — MISCELLANEOUS
Message  Return to work or school:        Robbin Ochoa is a patient here for prenatal care  Her EDC is 1/29/17  She may have dental X-Rays done with double shield  She may have novocaine without epinephrine as needed  Jamison De Dios MD / yuridia yarbrough        Signatures   Electronically signed by : Laura Thompson, ; Aug  1 2016 12:12PM EST                       (Author)

## 2018-01-13 NOTE — PROGRESS NOTES
2016         RE: Meredith Mathews                             To: MarilinSARAH Madrigal    MR#: [id number]                                   [address]     : [facility], [address] [place] [address] Street: [phone]:[facility]                             Fax: [phone]   (Exam #: [id number])      The LMP of this 23year old,  G1, P0-0-0-0 patient was unknown, her   working LORIE is 2017 and the current gestational age is 10 weeks 2   days by [address] [place] [address]  A sonographic examination was performed on 2016 using real time equipment  The ultrasound examination was   performed using abdominal technique  The patient has a BMI of 24 8  Her   blood pressure today was 117/72  Earliest ultrasound found in her record: 2016   9w2d  2016 LORIE   Multiple longitudinal and transverse sections revealed a nobles   intrauterine pregnancy  A normal gestational sac was documented  A normal fetal pole was   visualized  Cardiac motion was observed at 149 bpm  The yolk sac was not   seen  INDICATIONS      dating   smoker   history of drug dependence      Exam Types      Level I      MEASUREMENTS (* Included In Average GA)      CRL              2 6 cm        9 weeks 2 days *      THE AVERAGE GESTATIONAL AGE is 9 weeks 2 days +/- 5 days  IMPRESSION      Nobles IUP   9 weeks and 2 days by this ultrasound  (LORIE=2017)   9 weeks and 2 days by 1st Tri Sono  (LORIE=2017)   Regular fetal heart rate of 149 bpm      GENERAL COMMENT      I had the pleasure of seeing Meredith Mathews in the  center on    for initial bleeding scan  She has a history of drug use and has   been on heroin in the past but no longer  She is not on any drug   medication at this time  She does smoke a pack of cigarettes per day  She   also has exercise-induced asthma  She has a history of knee surgery as   well   She denies any other medical or surgical concerns  She denies any   medication allergies or any exposure to alcohol  Her BMI is 24  Her blood   pressure today was 117/72 mmHg  Today's ultrasound showed a viable fetus  The crown-rump length measured   2 6 cm consistent with 9 weeks and 2 days gestational age  Thus I believe   he should use 2017 as her due date  Today a normal gestational   sac was seen  The fetal pole was seen and cardiac motion was observed at   149 bpm  The yolk sac was not seen well today  There was no evidence of a   subchorionic hematoma  I discussed the epidemiologic association of maternal tobacco use in   pregnancy as an independent risk factor for such adverse    outcomes such as fetal growth restriction  delivery, placental   abruption, intrauterine fetal demise, childhood neuro behavioral   abnormalities, childhood respiratory disease including asthma and   bronchitis, and the increased possibility of sudden infant death syndrome  We also discussed the importance of avoiding environmental tobacco smoke   given its combusted but nonfiltered status  We discussed measures by which   smoking cessation may be accomplished including behavioral modification   and pharmacotherapy  Pharmacotherapy would include products such as   nicotine replacement gum and patches and Wellbutrin therapy also known as   Zyban  The nicotine replacement dose should be based on the amount of   cigarettes she currently smokes  She should not smoke cigarettes and use   nicotine replacement at the same time as this can induce cardiac problems  Note that she is currently not on any illicit drugs including heroin which   is reassuring  She will return in 3-4 weeks for her initial sequential   screening  Thank you kindly for this referral       Total face-to-face time with the patient, excluding ultrasound time was 15   minutes with more than 50% of the time devoted to counseling and   coordination of care        Thank you very much for allowing me to participate in the care of your   patient  If you have any questions or concerns about today's visit, please   do not hesitate to call me  Sincerely,      SARAH Nelson R D M S Verneice Na, M D     Maternal-Fetal Medicine   Electronically signed 07/01/16 10:20

## 2018-01-14 VITALS
OXYGEN SATURATION: 98 % | WEIGHT: 172.38 LBS | TEMPERATURE: 97.3 F | SYSTOLIC BLOOD PRESSURE: 110 MMHG | HEIGHT: 65 IN | BODY MASS INDEX: 28.72 KG/M2 | DIASTOLIC BLOOD PRESSURE: 76 MMHG | HEART RATE: 98 BPM

## 2018-01-14 VITALS
DIASTOLIC BLOOD PRESSURE: 70 MMHG | HEART RATE: 101 BPM | HEIGHT: 65 IN | RESPIRATION RATE: 18 BRPM | WEIGHT: 157 LBS | TEMPERATURE: 98 F | SYSTOLIC BLOOD PRESSURE: 101 MMHG | BODY MASS INDEX: 26.16 KG/M2

## 2018-01-14 VITALS
HEART RATE: 97 BPM | DIASTOLIC BLOOD PRESSURE: 74 MMHG | OXYGEN SATURATION: 98 % | BODY MASS INDEX: 27.03 KG/M2 | SYSTOLIC BLOOD PRESSURE: 120 MMHG | HEIGHT: 65 IN | TEMPERATURE: 70 F | WEIGHT: 162.25 LBS

## 2018-01-14 NOTE — MISCELLANEOUS
Message   Date: 03 Apr 2017 3:53 PM EST, Recorded By: Juliana Moseley For: Kimberly Grammes: Manolo Burgos, Self   Phone: [phone] (Home), [phone] (Work)   Reason: Medical Complaint   pt said that the yeast is back on her breast  sent script for refill on her nystatin cream  Active Problems    1  Chronic hepatitis C affecting antepartum care of mother in second trimester   (647 63,070 54) (O98 412,B18 2)   2  Depression with anxiety (300 4) (F41 8)   3  Encounter for postpartum visit (V24 2) (Z39 2)   4  Hepatitis C virus infection (070 70) (B19 20)    Current Meds   1  Cranberry TABS; Therapy: (Recorded:51Egb5469) to Recorded   2  Nystatin 612817 UNIT/GM External Cream; APPLY 2-3 TIMES DAILY TO AFFECTED   AREA(S); Therapy: 96QFY3554 to (Evaluate:10Mar2017)  Requested for: 77NBK2688; Last   Rx:03Mar2017 Ordered   3  Prenate Essential 29-0 6-0 4-340 MG Oral Capsule; One daily; Therapy: 68TCQ3122 to (Last Rx:03Nov2016)  Requested for: 29REQ5497 Ordered   4  Probiotic CAPS; Therapy: (Recorded:19Jan2017) to Recorded   5  Tylenol Extra Strength 500 MG Oral Tablet; Therapy: (Recorded:30Jun2016) to Recorded    Allergies    1  No Known Drug Allergies    2  Animal dander - Cats   3  Animal dander - Dogs   4  Latex   5   Pollen    Plan  PMH: History of candidiasis    · Nystatin 414421 UNIT/GM External Cream; APPLY 2-3 TIMES DAILY TO  AFFECTED AREA(S)    Signatures   Electronically signed by : Arina Tse, ; Apr  3 2017  3:54PM EST                       (Author)

## 2018-01-14 NOTE — MISCELLANEOUS
Message  Return to work or school:        Olga Lemon is a patient here for prenatal care  She may have dental x-rays if necessary with double shield  Abel Villatoro MD / yuridia lpn        Signatures   Electronically signed by : Benny Bryant, ; Jul 5 2016  4:34PM EST                       (Author)

## 2018-01-14 NOTE — MISCELLANEOUS
Message   Date: 10 Feb 2017 1:28 PM EST, Recorded By: Fay Due For: Shreya Burnette   Caller: Thien Kurtz, Leon   Phone: [phone] (Home), [phone] (Work)   Reason: Medical Complaint   Patient called c/o vulvar itching and soreness, delivered 4 days ago, thinks she has Malay  [facility] Malay, can use OTC HCC or Aquaphor on irritated vulvar tissue  Pt also c/o nipple soreness  Advised Lanolin, continue to BF, change positions  Pt states bowels and bladder WNL, minimal bleeding, feeling well, states is very happy  Active Problems    1  ASCUS with positive high risk HPV cervical (795 01,795 05) (R87 610,R87 810)   2  Chronic hepatitis C affecting antepartum care of mother in second trimester   (647 63,070 54) (O98 412,B18 2)   3  Condyloma acuminata (078 11) (A63 0)   4  Depression with anxiety (300 4) (F41 8)   5  Drug use (305 90) (F19 90)   6  Hepatitis C virus (070 70) (B19 20)   7  Hepatitis C, acute (070 51) (B17 10)   8  History of drug abuse (305 93) (Z87 898)   9  History of heroin abuse (305 53) (Z87 898)   10  History of recurrent UTI (urinary tract infection) (V13 02) (Z87 440)   11  Pregnancy, obstetrical care (V22 1) (Z34 90)   12  Rh negative status during pregnancy, unspecified trimester (V23 89) (O09 899)   13  Suspected damage to fetus from drugs, affecting management of mother, antepartum,    not applicable or unspecified fetus (655 53) (O35 5XX0)    Current Meds   1  Cranberry TABS; Therapy: (Recorded:16Cve0068) to Recorded   2  Prenate Essential 29-0 6-0 4-340 MG Oral Capsule; One daily; Therapy: 99UBG4848 to (Last Rx:03Nov2016)  Requested for: 75DDU1109 Ordered   3  Probiotic CAPS; Therapy: (Recorded:19Jan2017) to Recorded   4  Tylenol Extra Strength 500 MG Oral Tablet; Therapy: (Recorded:30Jun2016) to Recorded    Allergies    1  No Known Drug Allergies    2  Animal dander - Cats   3  Animal dander - Dogs   4  Latex   5  Pollen    Signatures   Electronically signed by : Aislinn Fields, ; Feb 10 2017  1:40PM EST                       (Author)

## 2018-01-15 NOTE — MISCELLANEOUS
Message   Date: 11 Nov 2016 9:47 AM EST, Recorded By: Tory Zarco For: Shaggy Carcamo, Self   Phone: [phone] (Home), [phone] (Work)   Reason: Medical Complaint   pt feels that her movement is different and slower    pt will come in for a NST      Called patient as she still did not arrive in office  She stated she needed to find a ride and should arrive shortly  1        1 Amended By: Julio Cesar Aceves; Nov 11 2016 11:01 AM EST    Active Problems   1  ASCUS with positive high risk HPV cervical (795 01,795 05) (R87 610,R87 810)  2  Chronic hepatitis C affecting antepartum care of mother in second trimester   (647 63,070 54) (O98 412,B18 2)  3  Condyloma acuminata (078 11) (A63 0)  4  Depression with anxiety (300 4) (F41 8)  5  Drug use (305 90) (F19 90)  6  Exposure to parvovirus (V01 79) (Z20 828)  7  Hepatitis C virus (070 70) (B19 20)  8  Hepatitis C, acute (070 51) (B17 10)  9  History of drug abuse (305 93) (Z87 898)  10  Need for influenza vaccination (V04 81) (Z23)  11  Need for Tdap vaccination (V06 1) (Z23)  12  Pregnancy, obstetrical care (V22 1) (Z34 90)  13  Rh negative status during pregnancy, unspecified trimester (V23 89) (O09 899)  14  Suspected damage to fetus from drugs, affecting management of mother, antepartum,    not applicable or unspecified fetus (655 53) (O35 5XX0)    Current Meds  1  Prenate Essential 29-0 6-0 4-340 MG Oral Capsule; One daily; Therapy: 71XWC7253 to (Last Rx:03Nov2016)  Requested for: 55JIQ0105 Ordered  2  Tylenol Extra Strength 500 MG Oral Tablet; Therapy: (Recorded:30Jun2016) to Recorded    Allergies   1  No Known Drug Allergies   2  Animal dander - Cats  3  Animal dander - Dogs  4  Latex  5   Pollen    Signatures   Electronically signed by : Mehdi Oro, ; Nov 14 2016 10:36AM EST                       (Author)

## 2018-01-15 NOTE — MISCELLANEOUS
Message   Date: 04 Nov 2016 10:57 AM EST, Recorded By: Rudy Odom For: Vj Has: Leon Rodriguez   Phone: [phone] (Home), [phone] (Work)   Reason: Other   Pharmacist from [facility] in [address] called to ask if he can change patient's PNV  Prenate Essential has been discontinued  Advised ok to give equivalent substitute  Active Problems    1  ASCUS with positive high risk HPV cervical (795 01,795 05) (R87 610,R87 810)   2  Chronic hepatitis C affecting antepartum care of mother in second trimester   (647 63,070 54) (O98 412,B18 2)   3  Condyloma acuminata (078 11) (A63 0)   4  Depression with anxiety (300 4) (F41 8)   5  Drug use (305 90) (F19 90)   6  Hepatitis C virus (070 70) (B19 20)   7  Hepatitis C, acute (070 51) (B17 10)   8  History of drug abuse (305 93) (Z87 898)   9  Pregnancy, obstetrical care (V22 1) (Z34 90)   10  Rh negative status during pregnancy, unspecified trimester (V23 89) (O09 899)   11  Suspected damage to fetus from drugs, affecting management of mother, antepartum,    not applicable or unspecified fetus (655 53) (O35 5XX0)    Current Meds   1  Prenate Essential 29-0 6-0 4-340 MG Oral Capsule; One daily; Therapy: 24XQL6219 to (Last Rx:03Nov2016)  Requested for: 60VDE4173 Ordered   2  Tylenol Extra Strength 500 MG Oral Tablet; Therapy: (Recorded:30Jun2016) to Recorded    Allergies    1  No Known Drug Allergies    2  Animal dander - Cats   3  Animal dander - Dogs   4  Latex   5   Pollen    Signatures   Electronically signed by : Rodney Herring, ; Nov 4 2016 11:06AM EST                       (Author)

## 2018-01-15 NOTE — PROGRESS NOTES
Plan  Anxiety during pregnancy in third trimester, antepartum, Hepatitis C virus, History of  heroin abuse    · [address] [facility] (NEUROPSYCHIATRY ) Physician Referral  Consult Only: the  expectation is that the referring provider will communicate back to the patient  on treatment options  Evaluation and Treatment: the expectation is that the referred to  provider will communicate back to the patient on treatment options  Status: Active -  Retrospective Authorization  Requested for: [id number]  Care Summary provided  : Yes    Active Problems    1  Anxiety (300 00) (F41 9)   2  Anxiety during pregnancy in third trimester, antepartum (648 43,300 00) (O99 343,F41 9)   3  ASCUS with positive high risk HPV cervical (795 01,795 05) (R87 610,R87 810)   4  Chronic hepatitis C affecting antepartum care of mother in second trimester   (647 63,070 54) (O98 412,B18 2)   5  Condyloma acuminata (078 11) (A63 0)   6  Depression with anxiety (300 4) (F41 8)   7  Drug use (305 90) (F19 90)   8  Exposure to parvovirus (V01 79) (Z20 828)   9  Hepatitis C virus (070 70) (B19 20)   10  Hepatitis C, acute (070 51) (B17 10)   11  History of drug abuse (305 93) (Z87 898)   12  History of heroin abuse (305 53) (Z87 898)   13  History of recurrent UTI (urinary tract infection) (V13 02) (Z87 440)   14  History of UTI (V13 02) (Z87 440)   15  Pregnancy, obstetrical care (V22 1) (Z34 90)   16  Rh negative status during pregnancy, unspecified trimester (V23 89) (O09 899)   17  Suspected damage to fetus from drugs, affecting management of mother, antepartum,    not applicable or unspecified fetus (655 53) (O35 5XX0)   25  Urinary tract infection (599 0) (N39 0)    Current Meds    1  Prenate Essential 29-0 6-0 4-340 MG Oral Capsule; One daily; Therapy: 23XTZ2601 to (Last Rx:03Nov2016)  Requested for: 32RTM0016 Ordered    2  Nitrofurantoin Macrocrystal 100 MG Oral Capsule; TAKE 1 CAPSULE EVERY 12 HOURS   DAILY;    Therapy: 93MUF9711 to (Complete:65Fvt4808)  Requested for: 96PEI1907; Last   Rx:69Uhi9572 Ordered    3  Tylenol Extra Strength 500 MG Oral Tablet; Therapy: (Recorded:30Jun2016) to Recorded    Allergies    1  No Known Drug Allergies    2  Animal dander - Cats   3  Animal dander - Dogs   4  Latex   5  Pollen    Results/Data  [id number] Abdominal Ultrasound OB Fermín Mcfarlandrow:   Procedure: 94154- Ultrasound pregnant uterus real time with image documentation, limited one or more fetuses  The study was done today in the office  Indication: EDC gestational age 30w4d with an LORIE of 1/31/2017 weeks  Exam indication: decreased fetal movement  Findings:   Amniotic fluid volume: 18 9 + 41 4 + 14 2 + 35 5 = 110 0mm  Fetal heart beat: 142bpm    Fetal position: Vertex  Impression: Appropriate NIKOLAY for fetal age  Future Appointments    Date/Time Provider Specialty Site   01/04/2017 08:00 AM SARAH Beck   Obstetrics/Gynecology [facility]   12/19/2016 08:00 AM Cindy Morrell MD Obstetrics/Gynecology [facility]     Signatures   Electronically signed by : Bernardo Basilio, ; Dec 14 2016  1:54PM EST                       (Author)    Electronically signed by : SARAH Kumari ; Dec 14 2016  3:17PM EST

## 2018-01-15 NOTE — MISCELLANEOUS
Message   Recorded as Task   Date: 12/09/2016 10:57 AM, Created By: Cece Prince   Task Name: Follow Up   Assigned To: Adrianna Vann   Regarding Patient: Mtichell Bowens, Status: Active   CommentRosa Segura - 09 Dec 2016 10:57 AM     TASK CREATED  Patient is calling for urine culture results  Concerned that her sx will worsen over weekend  Wants antibiotic  YudithZane - 09 Dec 2016 3:07 PM     TASK REPLIED TO: [facility]  Prescription was sent to the pharmacy  I call the patient and left a voice message on her answering machine about this  Thanks        Active Problems    1  ASCUS with positive high risk HPV cervical (795 01,795 05) (R87 610,R87 810)   2  Chronic hepatitis C affecting antepartum care of mother in second trimester   (647 63,070 54) (O98 412,B18 2)   3  Condyloma acuminata (078 11) (A63 0)   4  Depression with anxiety (300 4) (F41 8)   5  Drug use (305 90) (F19 90)   6  Exposure to parvovirus (V01 79) (Z20 828)   7  Hepatitis C virus (070 70) (B19 20)   8  Hepatitis C, acute (070 51) (B17 10)   9  History of drug abuse (305 93) (Z87 898)   10  History of recurrent UTI (urinary tract infection) (V13 02) (Z87 440)   11  History of UTI (V13 02) (Z87 440)   12  Pregnancy, obstetrical care (V22 1) (Z34 90)   13  Rh negative status during pregnancy, unspecified trimester (V23 89) (O09 899)   14  Suspected damage to fetus from drugs, affecting management of mother, antepartum,    not applicable or unspecified fetus (655 53) (O35 5XX0)   13  Urinary tract infection (599 0) (N39 0)    Current Meds   1  Nitrofurantoin Macrocrystal 100 MG Oral Capsule; TAKE 1 CAPSULE EVERY 12 HOURS   DAILY; Therapy: 14TEQ5002 to (Complete:33Yfl4948)  Requested for: 48LPF5152; Last   Rx:31Nvw4549 Ordered   2  Prenate Essential 29-0 6-0 4-340 MG Oral Capsule; One daily; Therapy: 00RBU5896 to (Last Rx:03Nov2016)  Requested for: 60NLH8311 Ordered   3   Tylenol Extra Strength 500 MG Oral Tablet; Therapy: (Recorded:30Jun2016) to Recorded    Allergies    1  No Known Drug Allergies    2  Animal dander - Cats   3  Animal dander - Dogs   4  Latex   5   Pollen    Signatures   Electronically signed by : Joe Edwards, ; Dec  9 2016  3:11PM EST                       (Author)

## 2018-01-15 NOTE — MISCELLANEOUS
Message   Recorded as Task   Date: 01/31/2017 09:31 AM, Created By: Samuel Damon   Task Name: Medical Complaint Callback   Assigned To: Adrianna Vann   Regarding Patient: Gill Benton, Status: Active   CommentKenard Flcarlos - 31 Jan 2017 9:31 AM     TASK CREATED  OB patient called c/o contx since 0400, possible ROM  Sent to JUDY&Zane Murphy - 31 Jan 2017 7:12 PM     TASK REPLIED TO: Previously Assigned To Giferent, thanks        Active Problems    1  ASCUS with positive high risk HPV cervical (795 01,795 05) (R87 610,R87 810)   2  Chronic hepatitis C affecting antepartum care of mother in second trimester   (647 63,070 54) (O98 412,B18 2)   3  Condyloma acuminata (078 11) (A63 0)   4  Depression with anxiety (300 4) (F41 8)   5  Drug use (305 90) (F19 90)   6  Hepatitis C virus (070 70) (B19 20)   7  Hepatitis C, acute (070 51) (B17 10)   8  History of drug abuse (305 93) (Z87 898)   9  History of heroin abuse (305 53) (Z87 898)   10  History of recurrent UTI (urinary tract infection) (V13 02) (Z87 440)   11  Pregnancy, obstetrical care (V22 1) (Z34 90)   12  Rh negative status during pregnancy, unspecified trimester (V23 89) (O09 899)   13  Suspected damage to fetus from drugs, affecting management of mother, antepartum,    not applicable or unspecified fetus (655 53) (O35 5XX0)    Current Meds   1  Cranberry TABS; Therapy: (Recorded:72Jeh7213) to Recorded   2  Prenate Essential 29-0 6-0 4-340 MG Oral Capsule; One daily; Therapy: 14BRV8101 to (Last Rx:03Nov2016)  Requested for: 19SMJ6447 Ordered   3  Probiotic CAPS; Therapy: (Recorded:19Jan2017) to Recorded   4  Tylenol Extra Strength 500 MG Oral Tablet; Therapy: (Recorded:30Jun2016) to Recorded    Allergies    1  No Known Drug Allergies    2  Animal dander - Cats   3  Animal dander - Dogs   4  Latex   5   Pollen    Signatures   Electronically signed by : Naila Mondragon, ; Feb  3 2017  7:50AM EST (Author)

## 2018-01-16 NOTE — MISCELLANEOUS
Message   Date: 12 Jan 2017 8:54 AM EST, Recorded By: Irma Floyd For: Shelly Esquivel, Self   Phone: [phone] (Home), [phone] (Work)   Reason: Other   pt was at [facility]    faxed over pts proof of due date    faxed to [phone]        Active Problems    1  Anxiety (300 00) (F41 9)   2  Anxiety during pregnancy in third trimester, antepartum (648 43,300 00) (O99 343,F41 9)   3  ASCUS with positive high risk HPV cervical (795 01,795 05) (R87 610,R87 810)   4  Chronic hepatitis C affecting antepartum care of mother in second trimester   (647 63,070 54) (O98 412,B18 2)   5  Condyloma acuminata (078 11) (A63 0)   6  Currently pregnant (V22 2) (Z33 1)   7  Depression with anxiety (300 4) (F41 8)   8  Drug use (305 90) (F19 90)   9  Exposure to parvovirus (V01 79) (Z20 828)   10  Hepatitis C virus (070 70) (B19 20)   11  Hepatitis C, acute (070 51) (B17 10)   12  History of drug abuse (305 93) (Z87 898)   13  History of heroin abuse (305 53) (Z87 898)   14  History of recurrent UTI (urinary tract infection) (V13 02) (Z87 440)   15  History of UTI (V13 02) (Z87 440)   16  Pregnancy, obstetrical care (V22 1) (Z34 90)   17  Rh negative status during pregnancy, unspecified trimester (V23 89) (O09 899)   18  Suspected damage to fetus from drugs, affecting management of mother, antepartum,    not applicable or unspecified fetus (655 53) (O35 5XX0)   23  Urinary tract infection (599 0) (N39 0)    Current Meds   1  Cranberry TABS; Therapy: (Recorded:58Ptq0754) to Recorded   2  Prenate Essential 29-0 6-0 4-340 MG Oral Capsule; One daily; Therapy: 95TUV8022 to (Last Rx:03Nov2016)  Requested for: 86QNE0511 Ordered   3  Tylenol Extra Strength 500 MG Oral Tablet; Therapy: (Recorded:30Jun2016) to Recorded    Allergies    1  No Known Drug Allergies    2  Animal dander - Cats   3  Animal dander - Dogs   4  Latex   5   Pollen    Signatures   Electronically signed by : Griselda Shiley, ; Jan 12 2017  8:55AM EST                       (Author)

## 2018-01-16 NOTE — MISCELLANEOUS
Message   Recorded as Task   Date: 06/20/2017 11:11 AM, Created By: Jay Eason   Task Name: Care Coordination   Assigned To: Maya Gill   Regarding Patient: Buddy Handy, Status: Active   Comment:    Maya Gill - 20 Jun 2017 7:11 AM     TASK CREATED  pt has her cycle and wants to start the bc pill this Sunday    what did you want to give her    she wants one that will not make her gain weight    she was on Loestrin 24 before  Story [facility] - 20 Jun 2017 3:10 PM     TASK REPLIED TO: Previously Assigned To Zane Zurita  None of the pills have been shown to cause weight gain  If she did well on Loestrin 24 previously, would suggest she go back on that pill  Thanks, would refill up until her next yearly exam   Maya Gill - 21 Jun 2017 4:26 AM     TASK REPLIED TO: Previously Assigned To Jay Eason  pt is still breastfeeding    So I guess you know what I ordered    she failed to tell me this before  [facility] - 21 Jun 2017 6:32 AM     TASK REPLIED TO: Previously Assigned To Zane Zurita  Agree with minipill recommendations  Thanks        Active Problems    1  Chronic hepatitis C affecting antepartum care of mother in second trimester   (647 63,070 54) (O98 412,B18 2)   2  Depression with anxiety (300 4) (F41 8)   3  Encounter for initial prescription of contraceptive pills (V25 01) (Z30 011)   4  Encounter for postpartum visit (V24 2) (Z39 2)   5  Group C streptococcal infection (041 03) (A49 1)   6  Hepatitis C virus infection (070 70) (B19 20)    Current Meds   1  Cranberry TABS; Therapy: (Recorded:67Qjc2327) to Recorded   2  Norethindrone 0 35 MG Oral Tablet; Take 1 tablet daily; Therapy: 21Jun2017 to (Hermann Gaffney)  Requested for: 21Jun2017; Last   YK:78VXK5652 Ordered   3  Nystatin 528771 UNIT/GM External Cream; APPLY 2-3 TIMES DAILY TO AFFECTED   AREA(S); Therapy: 36VWD9097 to (Evaluate:43Swb3497)  Requested for: 20Jun2017; Last   Rx:20Jun2017 Ordered   4   Penicillin V Potassium 500 MG Oral Tablet; TAKE 1 TABLET 3 TIMES DAILY; Therapy: 46GGR9331 to (Evaluate:46Nnh7367); Last Rx:29Apr2017 Ordered   5  Prenate Essential 29-0 6-0 4-340 MG Oral Capsule; One daily; Therapy: 82BRB9454 to (Last Rx:03Nov2016)  Requested for: 63YVS6784 Ordered   6  Probiotic CAPS; Therapy: (Recorded:19Jan2017) to Recorded   7  Tylenol Extra Strength 500 MG Oral Tablet; Therapy: (Recorded:30Jun2016) to Recorded    Allergies    1  No Known Drug Allergies    2  Animal dander - Cats   3  Animal dander - Dogs   4  Latex   5   Pollen    Signatures   Electronically signed by : Sun Newman, ; Jun 21 2017 10:39AM EST                       (Author)

## 2018-01-16 NOTE — MISCELLANEOUS
Message  GI Reminder Recall [facility]:   Date: 09/12/2017   Dear Benny Busby:     Review of our records shows you are due for the following: Follow Up Visit  Please call the following office to schedule your appointment:   [address] [place] [address], [address] [place] [address] [phone]  We look forward to hearing from you!      Sincerely,     [facility]      Signatures   Electronically signed by : Jamil Velasquez, ; Sep 12 2017  3:02PM EST                       (Author)

## 2018-01-16 NOTE — MISCELLANEOUS
Message   Date: 13 Sep 2016 3:12 PM EST, Recorded By: Fay Due For: Richmond Braxton: Claudean Auerbach, Self   Phone: [phone] (Home), [phone] (Work)   Reason: Medical Complaint   OB patient called  Still living in [place]  Has not tranferred her prenatal care to a new OBGYN yet   has an appointment tomorrow  Pt c/o tooth abscess  Advised call her dentist in [place] and set up appt  Can get new note from OBGYN after appt tomorrow  Active Problems    1  Depression with anxiety (300 4) (F41 8)   2  Drug use (305 90) (F19 90)   3  Exposure to blood or body fluid (V15 85) (Z77 21)   4  Headache (784 0) (R51)   5  High-risk pregnancy in first trimester (V23 9) (O09 91)   6  Pregnancy, obstetrical care (V22 1) (Z34 90)    Current Meds   1  Benadryl TABS; Therapy: (Recorded:29Jun2016) to Recorded   2  Emetrol SOLN;   Therapy: (Recorded:30Jun2016) to Recorded   3  Prenatal TABS; Therapy: (Jt Sherman) to Recorded   4  Subutex 8 MG SUBL (Buprenorphine HCl); Therapy: (Recorded:30Jun2016) to Recorded   5  Tylenol Extra Strength 500 MG Oral Tablet; Therapy: (Recorded:30Jun2016) to Recorded    Allergies    1  No Known Drug Allergies    2  Animal dander - Cats   3  Animal dander - Dogs   4  Latex   5   Pollen    Signatures   Electronically signed by : Dann Spurling, ; Sep 13 2016  3:15PM EST                       (Author)

## 2018-01-16 NOTE — PROGRESS NOTES
Assessment    1  Encounter for preventive health examination (V70 0) (Z00 00)   2  Hepatitis C virus (070 70) (B19 20)    Plan  Health Maintenance, Hepatitis C virus    · (1) CBC/ PLT (NO DIFF); Status:Active; Requested for:37Iep4023;    · (1) COMPREHENSIVE METABOLIC PANEL; Status:Active; Requested for:31Fbs4011;    · (1) TSH; Status:Active; Requested for:28Feb2017;    · 1 - Charles Archer DO (Gastroenterology) Physician Referral  Consult Only: the  expectation is that the referring provider will communicate back to the patient on  treatment options  Evaluation and Treatment: the expectation is that the referred to  provider will communicate back to the patient on treatment options  Status: Active   Requested for: 28Feb2017  Care Summary provided  : Yes    Discussion/Summary  health maintenance visit Currently, she eats a healthy diet  the risks and benefits of cervical cancer screening were discussed cervical cancer screening is current The risks and benefits of immunizations were discussed and immunizations are up to date  Advice and education were given regarding nutrition, aerobic exercise, vitamin D supplements and reproductive health  Patient is able to Self-Care  The patient was counseled regarding instructions for management, risk factor reductions, prognosis, patient and family education, impressions  Chief Complaint  pt here for annual physical      History of Present Illness  HM, Adult Female: The patient is being seen for a health maintenance evaluation  Social History: Household members include parents  She is unmarried  Work status: not currently employed  The patient is a former cigarette smoker  She reports rare alcohol use  She has previously used illicit drugs  She reports the use of cocaine  General Health: The patient's health since the last visit is described as good  Immunizations status: up to date  Lifestyle:  She consumes a diverse and healthy diet   She does not exercise regularly  Reproductive health:  pregnancy history: P     Screening:      Review of Systems    Constitutional: No fever, no chills, feels well, no tiredness, no recent weight gain or weight loss  Eyes: No complaints of eye pain, no red eyes, no eyesight problems, no discharge, no dry eyes, no itching of eyes  ENT: no complaints of earache, no loss of hearing, no nose bleeds, no nasal discharge, no sore throat, no hoarseness  Cardiovascular: No complaints of slow heart rate, no fast heart rate, no chest pain, no palpitations, no leg claudication, no lower extremity edema  Gastrointestinal: No complaints of abdominal pain, no constipation, no nausea or vomiting, no diarrhea, no bloody stools  Musculoskeletal: No complaints of arthralgias, no myalgias, no joint swelling or stiffness, no limb pain or swelling  Neurological: No complaints of headache, no confusion, no convulsions, no numbness, no dizziness or fainting, no tingling, no limb weakness, no difficulty walking  Psychiatric: Not suicidal, no sleep disturbance, no anxiety or depression, no change in personality, no emotional problems  Endocrine: No complaints of proptosis, no hot flashes, no muscle weakness, no deepening of the voice, no feelings of weakness  Hematologic/Lymphatic: No complaints of swollen glands, no swollen glands in the neck, does not bleed easily, does not bruise easily  Active Problems    1  ASCUS with positive high risk HPV cervical (795 01,795 05) (R87 610,R87 810)   2  Chronic hepatitis C affecting antepartum care of mother in second trimester   (647 63,070 54) (O98 412,B18 2)   3  Condyloma acuminata (078 11) (A63 0)   4  Depression with anxiety (300 4) (F41 8)   5  Drug use (305 90) (F19 90)   6  Hepatitis C virus (070 70) (B19 20)   7  Hepatitis C, acute (070 51) (B17 10)   8  History of drug abuse (305 93) (Z87 898)   9  History of heroin abuse (305 53) (Z87 898)   10   History of recurrent UTI (urinary tract infection) (V13 02) (Z87 440)   11  Pregnancy, obstetrical care (V22 1) (Z34 90)   12  Rh negative status during pregnancy, unspecified trimester (V23 89) (O09 899)   13   Suspected damage to fetus from drugs, affecting management of mother, antepartum,    not applicable or unspecified fetus (655 53) (O35 5XX0)    Past Medical History    · History of Anxiety (300 00) (F41 9)   · History of Anxiety during pregnancy in third trimester, antepartum (648 43,300 00)  (O99 343,F41 9)   · History of Asthma (493 90) (J45 909)   · History of Bacterial vaginosis (616 10,041 9) (N76 0,B96 89)   · History of Denial Of Any Significant Medical History   · History of Dysuria (788 1) (R30 0)   · History of Exposure to blood or body fluid (V15 85) (Z77 21)   · History of Exposure to parvovirus (V01 79) (Z20 828)   · History of  1 (V22 0) (Z34 00)   · History of High-risk pregnancy in first trimester (V23 9) (O09 91)   · History of acute pharyngitis (V12 69) (Z87 09)   · History of breast lump (V13 89) (X91 361)   · History of candidal vulvovaginitis (V13 29) (Z86 19)   · History of galactorrhea (V12 29) (Z87 59)   · History of headache (V13 89) (P01 465)   · History of pregnancy (V13 29)   · History of urinary tract infection (V13 02) (Z87 440)   · History of vaginal discharge (V13 29) (Z87 42)   · History of vaginal discharge (V13 29) (Z87 42)   · History of Intravenous drug abuse, episodic (305 92) (F19 10)   · History of Pregnancy at early stage (V22 2) (Z33 1)   · History of Urinary Tract Infection (V13 02)   · History of Vaginitis due to Candida albicans (112 1) (B37 3)    Surgical History    · History of Knee Surgery   · History of Oral Surgery    Family History  Brother    · Family history of Asthma (V17 5)  Family History    · Family history of Diabetes Mellitus (V18 0)   · Family history of No Significant Family History    Social History    · Denied: History of Alcohol   · Birth Control Method - Oral Contraceptives   · Caffeine Use   · Drug use (305 90) (F19 90)   · Drug Use (305 90)   · Former smoker (V15 82) (Z87 891)   · No alcohol use   · [employer]   · Uses safety equipment    Current Meds   1  Cranberry TABS; Therapy: (Recorded:48Yuk5752) to Recorded   2  Prenate Essential 29-0 6-0 4-340 MG Oral Capsule; One daily; Therapy: 72ZFA8094 to (Last Rx:03Nov2016)  Requested for: 34ORY4746 Ordered   3  Probiotic CAPS; Therapy: (Recorded:19Jan2017) to Recorded   4  Tylenol Extra Strength 500 MG Oral Tablet; Therapy: (Recorded:30Jun2016) to Recorded    Allergies    1  No Known Drug Allergies    2  Animal dander - Cats   3  Animal dander - Dogs   4  Latex   5  Pollen    Vitals   Recorded: 92Ite6530 01:04PM   Temperature 97 5 F, Tympanic   Heart Rate 69   Systolic 796, LUE, Sitting   Diastolic 68, LUE, Sitting   Height 5 ft 5 in   Weight 173 lb 8 oz   BMI Calculated 28 87   BSA Calculated 1 86   O2 Saturation 98, RA     Physical Exam    Constitutional   General appearance: Abnormal   overweight  Head and Face   Head and face: Normal     Eyes   Conjunctiva and lids: No swelling, erythema or discharge  Ears, Nose, Mouth, and Throat   External inspection of ears and nose: Normal     Otoscopic examination: Tympanic membranes translucent with normal light reflex  Canals patent without erythema  Oropharynx: Normal with no erythema, edema, exudate or lesions  Neck   Neck: Supple, symmetric, trachea midline, no masses  Thyroid: Normal, no thyromegaly  Pulmonary   Auscultation of lungs: Clear to auscultation  Cardiovascular   Palpation of heart: Normal PMI, no thrills  Auscultation of heart: Normal rate and rhythm, normal S1 and S2, no murmurs  Peripheral vascular exam: Normal     Examination of extremities for edema and/or varicosities: Normal     Chest   Breasts: Normal, no dimpling or skin changes appreciated  Abdomen   Abdomen: Non-tender, no masses      Liver and spleen: No hepatomegaly or splenomegaly  Lymphatic   Palpation of lymph nodes in neck: No lymphadenopathy  Musculoskeletal   Gait and station: Normal     Neurologic   Cranial nerves: Cranial nerves II-XII intact  Cortical function: Normal mental status  Psychiatric   Judgment and insight: Normal        Results/Data  PHQ-9 Adult Depression Screening 21Jxa2092 01:03PM User, Carmen     Test Name Result Flag Reference   PHQ-9 Adult Depression Score 3     Over the last two weeks, how often have you been bothered by any of the following problems? Little interest or pleasure in doing things: Not at all - 0  Feeling down, depressed, or hopeless: Not at all - 0  Trouble falling or staying asleep, or sleeping too much: Not at all - 0  Feeling tired or having little energy: Not at all - 0  Poor appetite or over eating: Nearly every day - 3  Feeling bad about yourself - or that you are a failure or have let yourself or your family down: Not at all - 0  Trouble concentrating on things, such as reading the newspaper or watching television: Not at all - 0  Moving or speaking so slowly that other people could have noticed  Or the opposite -  being so fidgety or restless that you have been moving around a lot more than usual: Not at all - 0  Thoughts that you would be better off dead, or of hurting yourself in some way: Not at all - 0   PHQ-9 Adult Depression Screening Negative     PHQ-9 Difficulty Level Not difficult at all     PHQ-9 Severity Minimal Depression         Future Appointments    Date/Time Provider Specialty Site   03/17/2017 10:15 AM SARAH Tim  Obstetrics/Gynecology [facility]     Signatures   Electronically signed by :  Praveen Nelson MD; Feb 28 2017  1:32PM EST                       (Author)

## 2018-01-16 NOTE — MISCELLANEOUS
Message   Recorded as Task   Date: 01/16/2017 01:24 PM, Created By: Meghann Duval   Task Name: Care Coordination   Assigned To: Maya Gill   Regarding Patient: Noble Nix, Status: Active   Comment:    Meghann Duval - 16 Jan 2017 1:24 PM     TASK CREATED  pt said that everyone is sick that is around her and she wants to use Emergency    It has B and C vitamins and electolytes    It boosts the immune system    Can she take this? Zane Zurita - 16 Jan 2017 6:38 PM     TASK REPLIED TO: Previously Assigned To Zane Zurita  Would probably okay as long as there are not incredibly high amounts of vitamins in it  Please avoid anything with high-dose of vitamin A        Active Problems    1  Anxiety (300 00) (F41 9)   2  Anxiety during pregnancy in third trimester, antepartum (648 43,300 00) (O99 343,F41 9)   3  ASCUS with positive high risk HPV cervical (795 01,795 05) (R87 610,R87 810)   4  Chronic hepatitis C affecting antepartum care of mother in second trimester   (647 63,070 54) (O98 412,B18 2)   5  Condyloma acuminata (078 11) (A63 0)   6  Currently pregnant (V22 2) (Z33 1)   7  Depression with anxiety (300 4) (F41 8)   8  Drug use (305 90) (F19 90)   9  Exposure to parvovirus (V01 79) (Z20 828)   10  Hepatitis C virus (070 70) (B19 20)   11  Hepatitis C, acute (070 51) (B17 10)   12  History of drug abuse (305 93) (Z87 898)   13  History of heroin abuse (305 53) (Z87 898)   14  History of recurrent UTI (urinary tract infection) (V13 02) (Z87 440)   15  History of UTI (V13 02) (Z87 440)   16  Pregnancy, obstetrical care (V22 1) (Z34 90)   17  Rh negative status during pregnancy, unspecified trimester (V23 89) (O09 899)   18  Suspected damage to fetus from drugs, affecting management of mother, antepartum,    not applicable or unspecified fetus (655 53) (O35 5XX0)   23  Urinary tract infection (599 0) (N39 0)    Current Meds   1  Cranberry TABS; Therapy: (Recorded:39Eco0572) to Recorded   2   Prenate Essential 29-0 6-0 4-340 MG Oral Capsule; One daily; Therapy: 47UGH4289 to (Last Rx:03Nov2016)  Requested for: 02MTW9930 Ordered   3  Tylenol Extra Strength 500 MG Oral Tablet; Therapy: (Recorded:30Jun2016) to Recorded    Allergies    1  No Known Drug Allergies    2  Animal dander - Cats   3  Animal dander - Dogs   4  Latex   5   Pollen    Signatures   Electronically signed by : Aaliyah Cole, ; Jan 17 2017  9:22AM EST                       (Author)

## 2018-01-17 NOTE — MISCELLANEOUS
Message   Date: 01 Aug 2016 12:12 PM EST, Recorded By: Pricila Mao For: Hank Villafuerte   Patient called for note of permission to go to the dentist in [place]  Faxed per request         Active Problems    1  Depression with anxiety (300 4) (F41 8)   2  Drug use (305 90) (F19 90)   3  Exposure to blood or body fluid (V15 85) (Z77 21)   4  Headache (784 0) (R51)   5  High-risk pregnancy in first trimester (V23 9) (O09 91)   6  Pregnancy, obstetrical care (V22 1) (Z34 90)    Current Meds   1  Benadryl TABS; Therapy: (Recorded:29Jun2016) to Recorded   2  Emetrol SOLN;   Therapy: (Recorded:30Jun2016) to Recorded   3  Prenatal TABS; Therapy: (Apoorva Bob) to Recorded   4  Subutex 8 MG SUBL (Buprenorphine HCl); Therapy: (Recorded:30Jun2016) to Recorded   5  Tylenol Extra Strength 500 MG Oral Tablet; Therapy: (Recorded:30Jun2016) to Recorded    Allergies    1  No Known Drug Allergies    2  Animal dander - Cats   3  Animal dander - Dogs   4  Latex   5   Pollen    Signatures   Electronically signed by : Romulo Dsouza, ; Aug  1 2016 12:13PM EST                       (Author)

## 2018-01-17 NOTE — MISCELLANEOUS
Message   Date: 05 Jul 2016 4:34 PM EST, Recorded By: Maurice Hayward For: Asa Vang   Reason: Other   [address] called for note of permission to give patient dental x-ray  Faxed note per request         Active Problems    1  Depression with anxiety (300 4) (F41 8)   2  Drug use (305 90) (F19 90)   3  Exposure to blood or body fluid (V15 85) (Z77 21)   4  Headache (784 0) (R51)   5  High-risk pregnancy in first trimester (V23 9) (O09 91)    Current Meds   1  Benadryl TABS; Therapy: (Recorded:29Jun2016) to Recorded   2  Colace 100 MG Oral Capsule; Therapy: (Recorded:30Jun2016) to Recorded   3  Dulcolax Milk of Magnesia 400 MG/5ML Oral Suspension; Therapy: (Recorded:30Jun2016) to Recorded   4  Emetrol SOLN;   Therapy: (Recorded:30Jun2016) to Recorded   5  Metamucil Smooth Texture 28 3 % Oral Powder; Therapy: (Recorded:30Jun2016) to Recorded   6  Mylanta Maximum Strength 400-400-40 MG/5ML SUSP; Therapy: (Recorded:58Buw3099) to Recorded   7  Prenatal TABS; Therapy: (Meliza Gonsalez) to Recorded   8  Subutex 8 MG SUBL (Buprenorphine HCl); Therapy: (Recorded:30Jun2016) to Recorded   9  Tylenol Extra Strength 500 MG Oral Tablet; Therapy: (Recorded:88Xvc8097) to Recorded   10  Zantac 75 75 MG Oral Tablet; Therapy: (Recorded:30Jun2016) to Recorded    Allergies    1  No Known Drug Allergies    2  Animal dander - Cats   3  Animal dander - Dogs   4   Pollen    Signatures   Electronically signed by : Kamille Drake, ; Jul 5 2016  4:36PM EST                       (Author)

## 2018-01-17 NOTE — MISCELLANEOUS
Message   Date: 23 Jun 2016 2:19 PM EST, Recorded By: Glendy Levy For: Bebeto Bodily: Leon Ramirez   Phone: [phone] (Home), [phone] (Work)   Reason: Medical Complaint   Patient called, moved back from [place], now is pregnant  LMP 4/24/16  Had u/s in [place] and is 6 weeks OB  Primip  Taking PNV  States is recovering from narcotic addiction but did use drugs early in her pregnancy before she knew she was pregnant  Now c/o insomnia  Worried about drug use causing problems with her pregnancy  Requests consultation with MD         Active Problems    1  Breast mass (611 72) (N63)   2  Depression with anxiety (300 4) (F41 8)   3  Exposure to blood or body fluid (V15 85) (Z77 21)   4  Galactorrhea (611 6) (O92 6)   5  Headache (784 0) (R51)   6  Vaginal discharge (623 5) (N89 8)    Current Meds   1  MetroNIDAZOLE 500 MG Oral Tablet; TAKE 1 TABLET TWICE DAILY UNTIL FINISHED; Therapy: 41HIK4592 to (05 12 73 93 30)  Requested for: 0490 51 30 85; Last   Rx:95Pqq3568 Ordered   2  QUEtiapine Fumarate 100 MG Oral Tablet (SEROquel); TAKE 1 TABLET DAILY    Requested for: 11ZLF4871; Last Rx:36Ejt8924 Ordered   3  Sulfamethoxazole-Trimethoprim 800-160 MG Oral Tablet (Bactrim DS); Take 1 tablet   twice daily; Therapy: 86CTO7204 to (05 12 73 93 30)  Requested for: 0490 51 30 85; Last   Rx:67Zte0681 Ordered    Allergies    1  No Known Drug Allergies    2   Pollen    Signatures   Electronically signed by : John Paul Liu, ; Jun 23 2016  2:24PM EST                       (Author)

## 2018-01-18 NOTE — MISCELLANEOUS
Message   Date: 18 Oct 2016 3:45 PM EST, Recorded By: Angelo Huff For: Hank Oden: Denise Sumner, Leon   Phone: [phone] (Home), [phone] (Work)   Reason: Medical Complaint   OB patient called c/o yellow vaginal discharge with itching, no odor  Went to ER in [place]  Had ultrasound, bloodwork, UA, pelvic exam, with no problems  Pt states has increased anxiety  Pt is leaving [place] tomorrow to move back to [facility]  Has appt with [facility] on 10/26  Advised call when arrives back in [place] if still has problem  Active Problems    1  Depression with anxiety (300 4) (F41 8)   2  Drug use (305 90) (F19 90)   3  Exposure to blood or body fluid (V15 85) (Z77 21)   4  Headache (784 0) (R51)   5  High-risk pregnancy in first trimester (V23 9) (O09 91)   6  Pregnancy, obstetrical care (V22 1) (Z34 90)    Current Meds   1  Benadryl TABS; Therapy: (Recorded:29Jun2016) to Recorded   2  Emetrol SOLN;   Therapy: (Recorded:30Jun2016) to Recorded   3  Prenatal TABS; Therapy: (Caretha Brando) to Recorded   4  Subutex 8 MG SUBL (Buprenorphine HCl); Therapy: (Recorded:30Jun2016) to Recorded   5  Tylenol Extra Strength 500 MG Oral Tablet; Therapy: (Recorded:30Jun2016) to Recorded    Allergies    1  No Known Drug Allergies    2  Animal dander - Cats   3  Animal dander - Dogs   4  Latex   5   Pollen    Signatures   Electronically signed by : Jennifer Hummel, ; Oct 18 2016  3:49PM EST                       (Author)

## 2018-01-18 NOTE — PROGRESS NOTES
Active Problems    1  ASCUS with positive high risk HPV cervical (795 01,795 05) (R87 610,R87 810)   2  Chronic hepatitis C affecting antepartum care of mother in second trimester   (647 63,070 54) (O98 412,B18 2)   3  Condyloma acuminata (078 11) (A63 0)   4  Depression with anxiety (300 4) (F41 8)   5  Drug use (305 90) (F19 90)   6  Exposure to parvovirus (V01 79) (Z20 828)   7  Hepatitis C virus (070 70) (B19 20)   8  Hepatitis C, acute (070 51) (B17 10)   9  History of drug abuse (305 93) (Z87 898)   10  Need for influenza vaccination (V04 81) (Z23)   11  Need for Tdap vaccination (V06 1) (Z23)   12  Pregnancy, obstetrical care (V22 1) (Z34 90)   13  Rh negative status during pregnancy, unspecified trimester (V23 89) (O09 899)   14  Suspected damage to fetus from drugs, affecting management of mother, antepartum,    not applicable or unspecified fetus (655 53) (O35 5XX0)    Current Meds    1  Prenate Essential 29-0 6-0 4-340 MG Oral Capsule; One daily; Therapy: 29END9826 to (Last Rx:03Nov2016)  Requested for: 75ZRY8862 Ordered    2  Tylenol Extra Strength 500 MG Oral Tablet; Therapy: (Recorded:30Jun2016) to Recorded    Allergies    1  No Known Drug Allergies    2  Animal dander - Cats   3  Animal dander - Dogs   4  Latex   5  Pollen    Results/Data  31950 Abdominal Ultrasound OB Ashley Giles:   Procedure: 31706- Ultrasound pregnant uterus real time with image documentation, limited one or more fetuses  The study was done today in the office  Indication: EDC gestational age 29w2d with an LORIE of 1/31/2017 weeks  Exam indication: Decreased fetal movement  Findings:   Amniotic fluid volume: 40 7 + 49 6 + 24 2 + 28 0 = 142 5mm  Fetal heart beat: 168bpm    Fetal position: vertex  Impression: Appropriate NIKOLAY for fetal age  Future Appointments    Date/Time Provider Specialty Site   11/23/2016 10:30 AM SARAH Guadalupe   Obstetrics/Gynecology [facility]     Signatures   Electronically signed by : Nalini Flores, ; Nov 11 2016 11:43AM EST                       (Author)    Electronically signed by : SARAH Cotto ; Nov 11 2016  3:01PM EST

## 2018-01-22 VITALS
BODY MASS INDEX: 28.39 KG/M2 | DIASTOLIC BLOOD PRESSURE: 62 MMHG | SYSTOLIC BLOOD PRESSURE: 118 MMHG | HEIGHT: 65 IN | WEIGHT: 170.38 LBS

## 2018-01-22 VITALS — DIASTOLIC BLOOD PRESSURE: 72 MMHG | SYSTOLIC BLOOD PRESSURE: 104 MMHG

## 2018-01-22 VITALS
HEIGHT: 65 IN | OXYGEN SATURATION: 98 % | TEMPERATURE: 97.5 F | HEART RATE: 69 BPM | SYSTOLIC BLOOD PRESSURE: 110 MMHG | WEIGHT: 173.5 LBS | BODY MASS INDEX: 28.91 KG/M2 | DIASTOLIC BLOOD PRESSURE: 68 MMHG

## 2018-01-22 VITALS — DIASTOLIC BLOOD PRESSURE: 82 MMHG | SYSTOLIC BLOOD PRESSURE: 106 MMHG

## 2018-01-22 VITALS — SYSTOLIC BLOOD PRESSURE: 114 MMHG | DIASTOLIC BLOOD PRESSURE: 70 MMHG

## 2018-01-23 NOTE — MISCELLANEOUS
Message   Recorded as Task   Date: 01/05/2018 12:49 PM, Created By: Erendira Molina   Task Name: Med Renewal Request   Assigned To: Daniela Aranda   Regarding Patient: Marlo Cabrera, Status: In Progress   Gricelda Stone - 05 Jan 2018 12:49 PM     TASK CREATED  Patient called to report she is done breastfeeding  She wants to go back on a combination pill  She does not want a pill which will cause weight gain  She previously took Lo Loestrin without any problems  She has her menses now and wants to start on 1/8/18  Zane Zurita - 08 Jan 2018 2:33 PM     TASK REPLIED TO: Previously Assigned To [employer] giveHer 1 pack, with1 refill  She no showedFor her December 2017YearlyExam   Recommend yearlyExam soon  Maya Gill - 09 Jan 2018 8:18 AM     TASK IN PROGRESS   Maya Gill - 09 Jan 2018 8:19 AM     TASK REASSIGNED: Previously Assigned To Kaitlynn Yang - 09 Jan 2018 8:39 AM     TASK EDITED  pt will schedule her yearly exam        Active Problems    1  Chronic hepatitis C affecting antepartum care of mother in second trimester   (647 63,070 54) (O98 412,B18 2)   2  Depression with anxiety (300 4) (F41 8)   3  Encounter for initial prescription of contraceptive pills (V25 01) (Z30 011)   4  Encounter for postpartum visit (V24 2) (Z39 2)   5  Epigastric pain (789 06) (R10 13)   6  Group C streptococcal infection (041 03) (A49 1)   7  Irritable bowel syndrome (IBS) (564 1) (K58 9)   8  Urinary tract infection without hematuria, site unspecified (599 0) (N39 0)    Current Meds   1  Cranberry TABS; Therapy: (Recorded:77Rfq1381) to Recorded   2  Nitrofurantoin Monohyd Macro 100 MG Oral Capsule; TAKE 1 CAPSULE EVERY 12   HOURS DAILY; Therapy: 64WLB3658 to (Evaluate:27Nov2017)  Requested for: 37QAW0750; Last   Rx:20Nov2017 Ordered   3  Norethindrone 0 35 MG Oral Tablet; Take 1 tablet daily;    Therapy: 21Jun2017 to (Chantal Serna) Requested for: 21Jun2017; Last   AZ:77DRA8360 Ordered   4  Nystatin 152937 UNIT/GM External Cream; APPLY 2-3 TIMES DAILY TO AFFECTED   AREA(S); Therapy: 14IQF7510 to (Evaluate:22Oas7235)  Requested for: 20Jun2017; Last   Rx:20Jun2017 Ordered   5  Penicillin V Potassium 500 MG Oral Tablet; TAKE 1 TABLET 3 TIMES DAILY; Therapy: 57DXA6735 to (Evaluate:27Yay8557); Last Rx:42Qwv6365 Ordered   6  Prenate Essential 29-0 6-0 4-340 MG Oral Capsule; One daily; Therapy: 04NWL0074 to (Last Rx:03Nov2016)  Requested for: 27FUI4871 Ordered   7  Probiotic CAPS; Therapy: (Recorded:19Jan2017) to Recorded   8  Tylenol Extra Strength 500 MG Oral Tablet; Therapy: (Recorded:30Jun2016) to Recorded    Allergies    1  No Known Drug Allergies    2  Animal dander - Cats   3  Animal dander - Dogs   4  Latex   5   Pollen    Signatures   Electronically signed by : Aislinn Cheng, ; Jan 9 2018  8:39AM EST                       (Author)

## 2018-01-23 NOTE — MISCELLANEOUS
Message  GI Reminder Recall [facility]:   Date: 01/04/2018   Dear Angel Dooley:     Review of our records shows you are due for the following: Follow Up Visit  Please call the following office to schedule your appointment:   [address] [place] [address], [address] [place] [address] [phone]  We look forward to hearing from you!      Sincerely,     NOT SENT      Signatures   Electronically signed by : Monica Alonso, ; Jan 4 2018 12:20PM EST                       (Author)

## 2018-01-23 NOTE — MISCELLANEOUS
Message  GI Reminder Recall [facility]:   Date: 12/26/2017   Dear Lucinda Rios:     Review of our records shows you are due for the following: Follow Up Visit  Please call the following office to schedule your appointment:   [address] [place] [address], [address] [place] [address], [address] [place] [address] [phone]  We look forward to hearing from you! Sincerely,     [facility]      Signatures   Electronically signed by :  Naila Gupta, ; Dec 26 2017  1:14PM EST                       (Author)

## 2018-01-25 ENCOUNTER — TELEPHONE (OUTPATIENT)
Dept: OTHER | Facility: OTHER | Age: 28
End: 2018-01-25

## 2018-01-25 NOTE — TELEPHONE ENCOUNTER
Patient called to report a john needle stick  She wants to know if and when she had a TDAP  Informed patient received Tdap on 11/9/16

## 2018-03-07 NOTE — PROGRESS NOTES
Education  Applimation Education 3rd Trimester: Third Trimester Education provided: benefits of breastfeeding, importance of exclusive breastfeeding, early initiation of breastfeeding, exclusive breastfeeding for the first 6 months, frequent feedings for optimal milk production, feeding on demand/baby-led feedings, effective positioning and attachment, importance of breastfeeding after 6 months following introduction of other foods, non-pharmacologic pain relief methods for labor, importance of early skin-to-skin contact and 28 week packet given  rooming-in on 24 hour basis Pregnancy Essentials Reference Guide given   The patient is planning on breastfeeding  Mother has not registered for prenatal class  Thought has been given to selecting a pediatrician  The mother has discussed personal preferences with her provider  Prenatal education provided by: Puja Maldonado Date: 11/03/02016        Signatures   Electronically signed by : SARAH Sam ; Nov  3 2016 12:30PM EST

## 2018-03-19 ENCOUNTER — TELEPHONE (OUTPATIENT)
Dept: OBGYN CLINIC | Facility: CLINIC | Age: 28
End: 2018-03-19

## 2018-03-19 DIAGNOSIS — N39.0 URINARY TRACT INFECTION WITHOUT HEMATURIA, SITE UNSPECIFIED: Primary | ICD-10-CM

## 2018-03-19 RX ORDER — SULFAMETHOXAZOLE AND TRIMETHOPRIM 800; 160 MG/1; MG/1
1 TABLET ORAL EVERY 12 HOURS SCHEDULED
Qty: 6 TABLET | Refills: 0 | Status: SHIPPED | OUTPATIENT
Start: 2018-03-19 | End: 2018-03-22

## 2018-05-09 DIAGNOSIS — N39.0 URINARY TRACT INFECTION WITHOUT HEMATURIA, SITE UNSPECIFIED: Primary | ICD-10-CM

## 2018-05-09 RX ORDER — SULFAMETHOXAZOLE AND TRIMETHOPRIM 800; 160 MG/1; MG/1
1 TABLET ORAL EVERY 12 HOURS SCHEDULED
Qty: 6 TABLET | Refills: 0 | Status: SHIPPED | OUTPATIENT
Start: 2018-05-09 | End: 2018-05-12

## 2018-12-18 ENCOUNTER — TRANSITIONAL CARE MANAGEMENT (OUTPATIENT)
Dept: INTERNAL MEDICINE CLINIC | Facility: CLINIC | Age: 28
End: 2018-12-18

## 2019-06-10 ENCOUNTER — APPOINTMENT (EMERGENCY)
Dept: RADIOLOGY | Facility: HOSPITAL | Age: 29
End: 2019-06-10
Payer: COMMERCIAL

## 2019-06-10 ENCOUNTER — HOSPITAL ENCOUNTER (EMERGENCY)
Facility: HOSPITAL | Age: 29
Discharge: HOME | End: 2019-06-11
Attending: EMERGENCY MEDICINE | Admitting: EMERGENCY MEDICINE
Payer: COMMERCIAL

## 2019-06-10 VITALS
SYSTOLIC BLOOD PRESSURE: 177 MMHG | TEMPERATURE: 97.8 F | OXYGEN SATURATION: 100 % | BODY MASS INDEX: 27.48 KG/M2 | DIASTOLIC BLOOD PRESSURE: 85 MMHG | WEIGHT: 165.12 LBS | HEART RATE: 108 BPM | RESPIRATION RATE: 20 BRPM

## 2019-06-10 DIAGNOSIS — Z91.89 POOR DENTAL HYGIENE: ICD-10-CM

## 2019-06-10 DIAGNOSIS — F19.10 DRUG ABUSE (HCC): Primary | ICD-10-CM

## 2019-06-10 LAB
AMPHETAMINES SERPL QL SCN: POSITIVE
ANION GAP SERPL CALCULATED.3IONS-SCNC: 14 MMOL/L (ref 4–13)
BACTERIA UR QL AUTO: ABNORMAL /HPF
BARBITURATES UR QL: POSITIVE
BASOPHILS # BLD AUTO: 0.04 THOUSANDS/ΜL (ref 0–0.1)
BASOPHILS NFR BLD AUTO: 0 % (ref 0–1)
BENZODIAZ UR QL: POSITIVE
BILIRUB UR QL STRIP: NEGATIVE
BUN SERPL-MCNC: 15 MG/DL (ref 5–25)
CALCIUM SERPL-MCNC: 8.7 MG/DL (ref 8.3–10.1)
CHLORIDE SERPL-SCNC: 99 MMOL/L (ref 100–108)
CLARITY UR: ABNORMAL
CLARITY, POC: NORMAL
CO2 SERPL-SCNC: 24 MMOL/L (ref 21–32)
COCAINE UR QL: POSITIVE
COLOR UR: YELLOW
COLOR, POC: YELLOW
CREAT SERPL-MCNC: 0.89 MG/DL (ref 0.6–1.3)
EOSINOPHIL # BLD AUTO: 0.2 THOUSAND/ΜL (ref 0–0.61)
EOSINOPHIL NFR BLD AUTO: 1 % (ref 0–6)
ERYTHROCYTE [DISTWIDTH] IN BLOOD BY AUTOMATED COUNT: 12.8 % (ref 11.6–15.1)
ETHANOL SERPL-MCNC: <3 MG/DL (ref 0–3)
EXT PREG TEST URINE: NEGATIVE
GFR SERPL CREATININE-BSD FRML MDRD: 90 ML/MIN/1.73SQ M
GLUCOSE SERPL-MCNC: 104 MG/DL (ref 65–140)
GLUCOSE UR STRIP-MCNC: NEGATIVE MG/DL
HCT VFR BLD AUTO: 36.5 % (ref 34.8–46.1)
HGB BLD-MCNC: 12.5 G/DL (ref 11.5–15.4)
HGB UR QL STRIP.AUTO: ABNORMAL
IMM GRANULOCYTES # BLD AUTO: 0.11 THOUSAND/UL (ref 0–0.2)
IMM GRANULOCYTES NFR BLD AUTO: 1 % (ref 0–2)
KETONES UR STRIP-MCNC: ABNORMAL MG/DL
LEUKOCYTE ESTERASE UR QL STRIP: NEGATIVE
LYMPHOCYTES # BLD AUTO: 2.28 THOUSANDS/ΜL (ref 0.6–4.47)
LYMPHOCYTES NFR BLD AUTO: 14 % (ref 14–44)
MCH RBC QN AUTO: 28.7 PG (ref 26.8–34.3)
MCHC RBC AUTO-ENTMCNC: 34.2 G/DL (ref 31.4–37.4)
MCV RBC AUTO: 84 FL (ref 82–98)
METHADONE UR QL: POSITIVE
MONOCYTES # BLD AUTO: 1.19 THOUSAND/ΜL (ref 0.17–1.22)
MONOCYTES NFR BLD AUTO: 7 % (ref 4–12)
NEUTROPHILS # BLD AUTO: 12.84 THOUSANDS/ΜL (ref 1.85–7.62)
NEUTS SEG NFR BLD AUTO: 77 % (ref 43–75)
NITRITE UR QL STRIP: NEGATIVE
NON-SQ EPI CELLS URNS QL MICRO: ABNORMAL /HPF
NRBC BLD AUTO-RTO: 0 /100 WBCS
OPIATES UR QL SCN: POSITIVE
PCP UR QL: NEGATIVE
PH UR STRIP.AUTO: 5.5 [PH] (ref 4.5–8)
PLATELET # BLD AUTO: 227 THOUSANDS/UL (ref 149–390)
PMV BLD AUTO: 8.2 FL (ref 8.9–12.7)
POTASSIUM SERPL-SCNC: 3.1 MMOL/L (ref 3.5–5.3)
PROT UR STRIP-MCNC: ABNORMAL MG/DL
RBC # BLD AUTO: 4.36 MILLION/UL (ref 3.81–5.12)
RBC #/AREA URNS AUTO: ABNORMAL /HPF
SODIUM SERPL-SCNC: 137 MMOL/L (ref 136–145)
SP GR UR STRIP.AUTO: 1.02 (ref 1–1.03)
THC UR QL: NEGATIVE
TROPONIN I SERPL-MCNC: <0.02 NG/ML
TSH SERPL DL<=0.05 MIU/L-ACNC: 0.8 UIU/ML (ref 0.36–3.74)
UROBILINOGEN UR QL STRIP.AUTO: 0.2 E.U./DL
WBC # BLD AUTO: 16.66 THOUSAND/UL (ref 4.31–10.16)
WBC #/AREA URNS AUTO: ABNORMAL /HPF

## 2019-06-10 PROCEDURE — 93005 ELECTROCARDIOGRAM TRACING: CPT

## 2019-06-10 PROCEDURE — 96361 HYDRATE IV INFUSION ADD-ON: CPT

## 2019-06-10 PROCEDURE — 80320 DRUG SCREEN QUANTALCOHOLS: CPT | Performed by: EMERGENCY MEDICINE

## 2019-06-10 PROCEDURE — 81025 URINE PREGNANCY TEST: CPT | Performed by: EMERGENCY MEDICINE

## 2019-06-10 PROCEDURE — 96360 HYDRATION IV INFUSION INIT: CPT

## 2019-06-10 PROCEDURE — 84443 ASSAY THYROID STIM HORMONE: CPT | Performed by: EMERGENCY MEDICINE

## 2019-06-10 PROCEDURE — 80307 DRUG TEST PRSMV CHEM ANLYZR: CPT | Performed by: EMERGENCY MEDICINE

## 2019-06-10 PROCEDURE — 94640 AIRWAY INHALATION TREATMENT: CPT

## 2019-06-10 PROCEDURE — 99284 EMERGENCY DEPT VISIT MOD MDM: CPT | Performed by: EMERGENCY MEDICINE

## 2019-06-10 PROCEDURE — 80048 BASIC METABOLIC PNL TOTAL CA: CPT | Performed by: EMERGENCY MEDICINE

## 2019-06-10 PROCEDURE — 85025 COMPLETE CBC W/AUTO DIFF WBC: CPT | Performed by: EMERGENCY MEDICINE

## 2019-06-10 PROCEDURE — 84484 ASSAY OF TROPONIN QUANT: CPT | Performed by: EMERGENCY MEDICINE

## 2019-06-10 PROCEDURE — 99284 EMERGENCY DEPT VISIT MOD MDM: CPT

## 2019-06-10 PROCEDURE — 71046 X-RAY EXAM CHEST 2 VIEWS: CPT

## 2019-06-10 PROCEDURE — 36415 COLL VENOUS BLD VENIPUNCTURE: CPT | Performed by: EMERGENCY MEDICINE

## 2019-06-10 PROCEDURE — 81001 URINALYSIS AUTO W/SCOPE: CPT

## 2019-06-10 PROCEDURE — 96372 THER/PROPH/DIAG INJ SC/IM: CPT

## 2019-06-10 RX ORDER — LORAZEPAM 2 MG/ML
1 INJECTION INTRAMUSCULAR ONCE
Status: DISCONTINUED | OUTPATIENT
Start: 2019-06-10 | End: 2019-06-10

## 2019-06-10 RX ORDER — ZIPRASIDONE MESYLATE 20 MG/ML
20 INJECTION, POWDER, LYOPHILIZED, FOR SOLUTION INTRAMUSCULAR ONCE
Status: COMPLETED | OUTPATIENT
Start: 2019-06-10 | End: 2019-06-10

## 2019-06-10 RX ORDER — LORAZEPAM 2 MG/ML
2 INJECTION INTRAMUSCULAR ONCE
Status: COMPLETED | OUTPATIENT
Start: 2019-06-10 | End: 2019-06-10

## 2019-06-10 RX ORDER — ALBUTEROL SULFATE 2.5 MG/3ML
5 SOLUTION RESPIRATORY (INHALATION) ONCE
Status: COMPLETED | OUTPATIENT
Start: 2019-06-10 | End: 2019-06-10

## 2019-06-10 RX ORDER — POTASSIUM CHLORIDE 20 MEQ/1
40 TABLET, EXTENDED RELEASE ORAL ONCE
Status: COMPLETED | OUTPATIENT
Start: 2019-06-10 | End: 2019-06-10

## 2019-06-10 RX ADMIN — Medication 10 ML: at 19:23

## 2019-06-10 RX ADMIN — POTASSIUM CHLORIDE 40 MEQ: 1500 TABLET, EXTENDED RELEASE ORAL at 23:19

## 2019-06-10 RX ADMIN — IPRATROPIUM BROMIDE 0.5 MG: 0.5 SOLUTION RESPIRATORY (INHALATION) at 20:20

## 2019-06-10 RX ADMIN — ALBUTEROL SULFATE 5 MG: 2.5 SOLUTION RESPIRATORY (INHALATION) at 20:19

## 2019-06-10 RX ADMIN — ZIPRASIDONE MESYLATE 20 MG: 20 INJECTION, POWDER, LYOPHILIZED, FOR SOLUTION INTRAMUSCULAR at 19:23

## 2019-06-10 RX ADMIN — IPRATROPIUM BROMIDE 0.5 MG: 0.5 SOLUTION RESPIRATORY (INHALATION) at 18:55

## 2019-06-10 RX ADMIN — WATER 10 ML: 1 INJECTION INTRAMUSCULAR; INTRAVENOUS; SUBCUTANEOUS at 19:23

## 2019-06-10 RX ADMIN — LORAZEPAM 2 MG: 2 INJECTION INTRAMUSCULAR; INTRAVENOUS at 18:54

## 2019-06-10 RX ADMIN — ALBUTEROL SULFATE 5 MG: 2.5 SOLUTION RESPIRATORY (INHALATION) at 18:54

## 2019-06-10 RX ADMIN — SODIUM CHLORIDE 1000 ML: 0.9 INJECTION, SOLUTION INTRAVENOUS at 21:08

## 2019-06-11 LAB
ATRIAL RATE: 89 BPM
ATRIAL RATE: 93 BPM
P AXIS: 106 DEGREES
PR INTERVAL: 114 MS
PR INTERVAL: 130 MS
QRS AXIS: 158 DEGREES
QRS AXIS: 166 DEGREES
QRSD INTERVAL: 90 MS
QRSD INTERVAL: 90 MS
QT INTERVAL: 360 MS
QT INTERVAL: 376 MS
QTC INTERVAL: 438 MS
QTC INTERVAL: 467 MS
T WAVE AXIS: 175 DEGREES
T WAVE AXIS: 177 DEGREES
VENTRICULAR RATE: 89 BPM
VENTRICULAR RATE: 93 BPM

## 2019-06-11 PROCEDURE — 93010 ELECTROCARDIOGRAM REPORT: CPT | Performed by: INTERNAL MEDICINE

## 2019-06-11 RX ADMIN — LIDOCAINE HYDROCHLORIDE 10 ML: 20 SOLUTION ORAL; TOPICAL at 00:14

## 2020-07-15 ENCOUNTER — APPOINTMENT (INPATIENT)
Dept: CT IMAGING | Facility: HOSPITAL | Age: 30
DRG: 720 | End: 2020-07-15
Payer: COMMERCIAL

## 2020-07-15 ENCOUNTER — APPOINTMENT (EMERGENCY)
Dept: CT IMAGING | Facility: HOSPITAL | Age: 30
DRG: 720 | End: 2020-07-15
Payer: COMMERCIAL

## 2020-07-15 ENCOUNTER — HOSPITAL ENCOUNTER (INPATIENT)
Facility: HOSPITAL | Age: 30
LOS: 7 days | Discharge: LEFT BEFORE DISCHARGE | DRG: 720 | End: 2020-07-22
Attending: EMERGENCY MEDICINE | Admitting: INTERNAL MEDICINE
Payer: COMMERCIAL

## 2020-07-15 DIAGNOSIS — N12 PYELONEPHRITIS: Primary | ICD-10-CM

## 2020-07-15 DIAGNOSIS — F41.9 ANXIETY: ICD-10-CM

## 2020-07-15 DIAGNOSIS — F11.10 HEROIN ABUSE (HCC): ICD-10-CM

## 2020-07-15 DIAGNOSIS — L02.612 ABSCESS OF LEFT FOOT: ICD-10-CM

## 2020-07-15 DIAGNOSIS — R78.81 BACTEREMIA DUE TO STAPHYLOCOCCUS AUREUS: ICD-10-CM

## 2020-07-15 DIAGNOSIS — A41.9 SEPSIS (HCC): ICD-10-CM

## 2020-07-15 DIAGNOSIS — B95.61 BACTEREMIA DUE TO STAPHYLOCOCCUS AUREUS: ICD-10-CM

## 2020-07-15 PROBLEM — J90 LOCULATED PLEURAL EFFUSION: Status: ACTIVE | Noted: 2020-07-15

## 2020-07-15 PROBLEM — E87.1 HYPONATREMIA: Status: ACTIVE | Noted: 2020-07-15

## 2020-07-15 PROBLEM — R65.10 SIRS (SYSTEMIC INFLAMMATORY RESPONSE SYNDROME) (HCC): Status: ACTIVE | Noted: 2020-07-15

## 2020-07-15 PROBLEM — N10 ACUTE PYELONEPHRITIS: Status: ACTIVE | Noted: 2020-07-15

## 2020-07-15 PROBLEM — R06.00 DYSPNEA: Status: ACTIVE | Noted: 2020-07-15

## 2020-07-15 LAB
AMPHETAMINES SERPL QL SCN: NEGATIVE
ANION GAP SERPL CALCULATED.3IONS-SCNC: 13 MMOL/L (ref 4–13)
ANION GAP SERPL CALCULATED.3IONS-SCNC: 9 MMOL/L (ref 4–13)
BACTERIA UR QL AUTO: ABNORMAL /HPF
BARBITURATES UR QL: NEGATIVE
BASOPHILS # BLD MANUAL: 0 THOUSAND/UL (ref 0–0.1)
BASOPHILS NFR MAR MANUAL: 0 % (ref 0–1)
BENZODIAZ UR QL: NEGATIVE
BILIRUB UR QL STRIP: NEGATIVE
BUN SERPL-MCNC: 12 MG/DL (ref 5–25)
BUN SERPL-MCNC: 8 MG/DL (ref 5–25)
CALCIUM SERPL-MCNC: 7.6 MG/DL (ref 8.3–10.1)
CALCIUM SERPL-MCNC: 7.9 MG/DL (ref 8.3–10.1)
CHLORIDE SERPL-SCNC: 90 MMOL/L (ref 100–108)
CHLORIDE SERPL-SCNC: 94 MMOL/L (ref 100–108)
CLARITY UR: ABNORMAL
CO2 SERPL-SCNC: 22 MMOL/L (ref 21–32)
CO2 SERPL-SCNC: 24 MMOL/L (ref 21–32)
COCAINE UR QL: POSITIVE
COLOR UR: YELLOW
CREAT SERPL-MCNC: 0.86 MG/DL (ref 0.6–1.3)
CREAT SERPL-MCNC: 0.9 MG/DL (ref 0.6–1.3)
EOSINOPHIL # BLD MANUAL: 0 THOUSAND/UL (ref 0–0.4)
EOSINOPHIL NFR BLD MANUAL: 0 % (ref 0–6)
ERYTHROCYTE [DISTWIDTH] IN BLOOD BY AUTOMATED COUNT: 12.6 % (ref 11.6–15.1)
EXT PREG TEST URINE: NEGATIVE
EXT. CONTROL ED NAV: NORMAL
GFR SERPL CREATININE-BSD FRML MDRD: 88 ML/MIN/1.73SQ M
GFR SERPL CREATININE-BSD FRML MDRD: 93 ML/MIN/1.73SQ M
GLUCOSE SERPL-MCNC: 108 MG/DL (ref 65–140)
GLUCOSE SERPL-MCNC: 144 MG/DL (ref 65–140)
GLUCOSE UR STRIP-MCNC: NEGATIVE MG/DL
HCT VFR BLD AUTO: 32.9 % (ref 34.8–46.1)
HGB BLD-MCNC: 11.3 G/DL (ref 11.5–15.4)
HGB UR QL STRIP.AUTO: ABNORMAL
KETONES UR STRIP-MCNC: NEGATIVE MG/DL
LEUKOCYTE ESTERASE UR QL STRIP: NEGATIVE
LYMPHOCYTES # BLD AUTO: 1.27 THOUSAND/UL (ref 0.6–4.47)
LYMPHOCYTES # BLD AUTO: 15 % (ref 14–44)
MCH RBC QN AUTO: 28.5 PG (ref 26.8–34.3)
MCHC RBC AUTO-ENTMCNC: 34.3 G/DL (ref 31.4–37.4)
MCV RBC AUTO: 83 FL (ref 82–98)
METHADONE UR QL: NEGATIVE
MONOCYTES # BLD AUTO: 1.1 THOUSAND/UL (ref 0–1.22)
MONOCYTES NFR BLD: 13 % (ref 4–12)
NEUTROPHILS # BLD MANUAL: 6.11 THOUSAND/UL (ref 1.85–7.62)
NEUTS BAND NFR BLD MANUAL: 6 % (ref 0–8)
NEUTS SEG NFR BLD AUTO: 66 % (ref 43–75)
NITRITE UR QL STRIP: NEGATIVE
NON-SQ EPI CELLS URNS QL MICRO: ABNORMAL /HPF
NRBC BLD AUTO-RTO: 0 /100 WBCS
OPIATES UR QL SCN: POSITIVE
OSMOLALITY UR: 440 MMOL/KG
OXYCODONE+OXYMORPHONE UR QL SCN: NEGATIVE
PCP UR QL: NEGATIVE
PH UR STRIP.AUTO: 6 [PH]
PLATELET # BLD AUTO: 207 THOUSANDS/UL (ref 149–390)
PLATELET BLD QL SMEAR: ADEQUATE
PMV BLD AUTO: 9.6 FL (ref 8.9–12.7)
POTASSIUM SERPL-SCNC: 3.3 MMOL/L (ref 3.5–5.3)
POTASSIUM SERPL-SCNC: 3.3 MMOL/L (ref 3.5–5.3)
PROT UR STRIP-MCNC: ABNORMAL MG/DL
RBC # BLD AUTO: 3.97 MILLION/UL (ref 3.81–5.12)
RBC #/AREA URNS AUTO: ABNORMAL /HPF
RBC MORPH BLD: NORMAL
SARS-COV-2 RNA RESP QL NAA+PROBE: NEGATIVE
SODIUM 24H UR-SCNC: 6 MOL/L
SODIUM SERPL-SCNC: 125 MMOL/L (ref 136–145)
SODIUM SERPL-SCNC: 127 MMOL/L (ref 136–145)
SP GR UR STRIP.AUTO: 1.02 (ref 1–1.03)
THC UR QL: POSITIVE
TOTAL CELLS COUNTED SPEC: 100
TSH SERPL DL<=0.05 MIU/L-ACNC: 0.51 UIU/ML (ref 0.36–3.74)
URATE SERPL-MCNC: 4.6 MG/DL (ref 2–6.8)
UROBILINOGEN UR QL STRIP.AUTO: 1 E.U./DL
WBC # BLD AUTO: 8.49 THOUSAND/UL (ref 4.31–10.16)
WBC #/AREA URNS AUTO: ABNORMAL /HPF

## 2020-07-15 PROCEDURE — 85027 COMPLETE CBC AUTOMATED: CPT | Performed by: PSYCHIATRY & NEUROLOGY

## 2020-07-15 PROCEDURE — 87147 CULTURE TYPE IMMUNOLOGIC: CPT | Performed by: PHYSICIAN ASSISTANT

## 2020-07-15 PROCEDURE — 80307 DRUG TEST PRSMV CHEM ANLYZR: CPT | Performed by: PHYSICIAN ASSISTANT

## 2020-07-15 PROCEDURE — 96374 THER/PROPH/DIAG INJ IV PUSH: CPT

## 2020-07-15 PROCEDURE — 36415 COLL VENOUS BLD VENIPUNCTURE: CPT | Performed by: PSYCHIATRY & NEUROLOGY

## 2020-07-15 PROCEDURE — 80048 BASIC METABOLIC PNL TOTAL CA: CPT | Performed by: PSYCHIATRY & NEUROLOGY

## 2020-07-15 PROCEDURE — 84443 ASSAY THYROID STIM HORMONE: CPT | Performed by: PHYSICIAN ASSISTANT

## 2020-07-15 PROCEDURE — 99285 EMERGENCY DEPT VISIT HI MDM: CPT | Performed by: EMERGENCY MEDICINE

## 2020-07-15 PROCEDURE — 96361 HYDRATE IV INFUSION ADD-ON: CPT

## 2020-07-15 PROCEDURE — 99223 1ST HOSP IP/OBS HIGH 75: CPT | Performed by: INTERNAL MEDICINE

## 2020-07-15 PROCEDURE — 87635 SARS-COV-2 COVID-19 AMP PRB: CPT | Performed by: PSYCHIATRY & NEUROLOGY

## 2020-07-15 PROCEDURE — 81025 URINE PREGNANCY TEST: CPT | Performed by: PSYCHIATRY & NEUROLOGY

## 2020-07-15 PROCEDURE — 74177 CT ABD & PELVIS W/CONTRAST: CPT

## 2020-07-15 PROCEDURE — 87086 URINE CULTURE/COLONY COUNT: CPT | Performed by: PHYSICIAN ASSISTANT

## 2020-07-15 PROCEDURE — 87040 BLOOD CULTURE FOR BACTERIA: CPT | Performed by: PHYSICIAN ASSISTANT

## 2020-07-15 PROCEDURE — 85007 BL SMEAR W/DIFF WBC COUNT: CPT | Performed by: PSYCHIATRY & NEUROLOGY

## 2020-07-15 PROCEDURE — 96375 TX/PRO/DX INJ NEW DRUG ADDON: CPT

## 2020-07-15 PROCEDURE — 83935 ASSAY OF URINE OSMOLALITY: CPT | Performed by: PHYSICIAN ASSISTANT

## 2020-07-15 PROCEDURE — 87186 SC STD MICRODIL/AGAR DIL: CPT | Performed by: PHYSICIAN ASSISTANT

## 2020-07-15 PROCEDURE — 84550 ASSAY OF BLOOD/URIC ACID: CPT | Performed by: PHYSICIAN ASSISTANT

## 2020-07-15 PROCEDURE — 84300 ASSAY OF URINE SODIUM: CPT | Performed by: PHYSICIAN ASSISTANT

## 2020-07-15 PROCEDURE — 83930 ASSAY OF BLOOD OSMOLALITY: CPT | Performed by: PHYSICIAN ASSISTANT

## 2020-07-15 PROCEDURE — 84145 PROCALCITONIN (PCT): CPT | Performed by: PHYSICIAN ASSISTANT

## 2020-07-15 PROCEDURE — 80048 BASIC METABOLIC PNL TOTAL CA: CPT | Performed by: PHYSICIAN ASSISTANT

## 2020-07-15 PROCEDURE — 81001 URINALYSIS AUTO W/SCOPE: CPT | Performed by: PSYCHIATRY & NEUROLOGY

## 2020-07-15 PROCEDURE — 99285 EMERGENCY DEPT VISIT HI MDM: CPT

## 2020-07-15 RX ORDER — ONDANSETRON 2 MG/ML
4 INJECTION INTRAMUSCULAR; INTRAVENOUS EVERY 6 HOURS PRN
Status: DISCONTINUED | OUTPATIENT
Start: 2020-07-15 | End: 2020-07-22 | Stop reason: HOSPADM

## 2020-07-15 RX ORDER — OXYCODONE HYDROCHLORIDE AND ACETAMINOPHEN 5; 325 MG/1; MG/1
1 TABLET ORAL EVERY 4 HOURS PRN
Status: DISCONTINUED | OUTPATIENT
Start: 2020-07-15 | End: 2020-07-20

## 2020-07-15 RX ORDER — HYDROMORPHONE HCL/PF 1 MG/ML
0.5 SYRINGE (ML) INJECTION ONCE
Status: COMPLETED | OUTPATIENT
Start: 2020-07-15 | End: 2020-07-15

## 2020-07-15 RX ORDER — LIDOCAINE 50 MG/G
1 PATCH TOPICAL DAILY
Status: DISCONTINUED | OUTPATIENT
Start: 2020-07-15 | End: 2020-07-22 | Stop reason: HOSPADM

## 2020-07-15 RX ORDER — KETOROLAC TROMETHAMINE 30 MG/ML
15 INJECTION, SOLUTION INTRAMUSCULAR; INTRAVENOUS EVERY 6 HOURS PRN
Status: ACTIVE | OUTPATIENT
Start: 2020-07-15 | End: 2020-07-17

## 2020-07-15 RX ORDER — SODIUM CHLORIDE 9 MG/ML
75 INJECTION, SOLUTION INTRAVENOUS CONTINUOUS
Status: DISCONTINUED | OUTPATIENT
Start: 2020-07-15 | End: 2020-07-22 | Stop reason: HOSPADM

## 2020-07-15 RX ORDER — KETOROLAC TROMETHAMINE 30 MG/ML
30 INJECTION, SOLUTION INTRAMUSCULAR; INTRAVENOUS ONCE
Status: COMPLETED | OUTPATIENT
Start: 2020-07-15 | End: 2020-07-15

## 2020-07-15 RX ORDER — OXYCODONE HYDROCHLORIDE 5 MG/1
5 TABLET ORAL ONCE
Status: COMPLETED | OUTPATIENT
Start: 2020-07-15 | End: 2020-07-15

## 2020-07-15 RX ORDER — KETOROLAC TROMETHAMINE 30 MG/ML
15 INJECTION, SOLUTION INTRAMUSCULAR; INTRAVENOUS EVERY 6 HOURS PRN
Status: DISCONTINUED | OUTPATIENT
Start: 2020-07-15 | End: 2020-07-15 | Stop reason: SDUPTHER

## 2020-07-15 RX ORDER — ACETAMINOPHEN 325 MG/1
650 TABLET ORAL EVERY 6 HOURS PRN
Status: DISCONTINUED | OUTPATIENT
Start: 2020-07-15 | End: 2020-07-22 | Stop reason: HOSPADM

## 2020-07-15 RX ORDER — KETOROLAC TROMETHAMINE 30 MG/ML
30 INJECTION, SOLUTION INTRAMUSCULAR; INTRAVENOUS EVERY 6 HOURS PRN
Status: DISPENSED | OUTPATIENT
Start: 2020-07-15 | End: 2020-07-17

## 2020-07-15 RX ADMIN — LIDOCAINE 1 PATCH: 50 PATCH TOPICAL at 17:01

## 2020-07-15 RX ADMIN — OXYCODONE HYDROCHLORIDE 5 MG: 5 TABLET ORAL at 18:51

## 2020-07-15 RX ADMIN — IOHEXOL 100 ML: 350 INJECTION, SOLUTION INTRAVENOUS at 11:56

## 2020-07-15 RX ADMIN — KETOROLAC TROMETHAMINE 30 MG: 30 INJECTION, SOLUTION INTRAMUSCULAR at 17:03

## 2020-07-15 RX ADMIN — CEFTRIAXONE SODIUM 1000 MG: 10 INJECTION, POWDER, FOR SOLUTION INTRAVENOUS at 12:58

## 2020-07-15 RX ADMIN — OXYCODONE AND ACETAMINOPHEN 1 TABLET: 5; 325 TABLET ORAL at 23:00

## 2020-07-15 RX ADMIN — SODIUM CHLORIDE 1000 ML: 0.9 INJECTION, SOLUTION INTRAVENOUS at 09:33

## 2020-07-15 RX ADMIN — HYDROMORPHONE HYDROCHLORIDE 0.5 MG: 1 INJECTION, SOLUTION INTRAMUSCULAR; INTRAVENOUS; SUBCUTANEOUS at 12:53

## 2020-07-15 RX ADMIN — KETOROLAC TROMETHAMINE 30 MG: 30 INJECTION, SOLUTION INTRAMUSCULAR at 09:35

## 2020-07-15 RX ADMIN — SODIUM CHLORIDE 75 ML/HR: 0.9 INJECTION, SOLUTION INTRAVENOUS at 17:07

## 2020-07-15 RX ADMIN — KETOROLAC TROMETHAMINE 30 MG: 30 INJECTION, SOLUTION INTRAMUSCULAR at 23:32

## 2020-07-15 RX ADMIN — HYDROMORPHONE HYDROCHLORIDE 0.5 MG: 1 INJECTION, SOLUTION INTRAMUSCULAR; INTRAVENOUS; SUBCUTANEOUS at 10:18

## 2020-07-15 NOTE — ASSESSMENT & PLAN NOTE
05/10/2017    Dr. Aicha Moultno  [address] [place] [address] [facility] [address] Dr. Sharee Handy ,     I saw June Morales in the afib, heart failure clinic today.   He is doing well and remains in regular rate and rhythm/sinus rhythm on Multaq, anticoag · CT: Somewhat streaky opacification of the renal cortex bilaterally consistent with pyelonephritis, uncomplicated  · Urinalysis fairly unremarkable with no evidence of nitrates, 4-10 WBCs and occasional bacteria    · Urine culture ordered and pending  · Patient tachycardic and tachypneic on admission with significant CVA tenderness  · Status post IV ceftriaxone in the emergency department which will be continued pending urine culture  · Obtain procalcitonin

## 2020-07-15 NOTE — ASSESSMENT & PLAN NOTE
· Tachycardia and tachypnea noted on admission  · No leukocytosis and no fever  · Differential includes has sepsis related to acute infection from pyelonephritis vs  Related to loculated pleural effusion vs  PNA  Cannot rule out PE/septic emboli/bacteremia  · Blood cultures were NOT obtained prior to IV ABX   These will be obtained however of note the patient has received approx 750 mg of IV ceftriaxone prior to obtaining these  · Follow up on procalcitonin and urine culture negative

## 2020-07-15 NOTE — ASSESSMENT & PLAN NOTE
· Differential diagnosis includes related to loculated pleural effusion versus underlying pneumonia versus septic emboli versus pulmonary emboli vs  endocarditis in the setting of tachycardia, tachypnea, back pain and dyspnea  · Patient is noted to have chronic heroin abuse  Last used approximately 1 year ago until then relapsing one week ago    · Check CTA chest to rule out PE and to further evaluate loculated effusion  · Follow up on cultures and procalcitonin

## 2020-07-15 NOTE — ASSESSMENT & PLAN NOTE
· Patient's history of heroin abuse - clean x 1 year then used 1 week ago (IV heroin)  · Prior endocarditis 2018

## 2020-07-15 NOTE — ED PROVIDER NOTES
History  Chief Complaint   Patient presents with    Shortness of Breath     c/o right upper back "stabbing pain" and sob x3 days  29-year-female with a past medical history of Hep C and heroine abuse reports to the ED with severe right sided back pain and shortness of breath  The patient reports that she believes she has a UTI  Recently of note, the patient was kicked out of her house last week by her mother, relapsed on heroine one time last week, and has been living out of her car  She is due to enter a rehab program today, however, she reported to the ED after her pain became unbearable  Onset was Monday 07/13/2020, pain is in her back on the right side, stabbing in nature, 10/10in severity  Nothing aggravates or relieves the pain  The patient reports she has tried "BC powder" which is a mixture of aspirin and caffeine without relief  Pt reports stabbing back pain on the right, shortness of breath, fever, burning during urination, and chills  Denies nausea, vomiting, diarrhea, change in appetite, abdominal pain, change in bowel function, blood in urine or stools, or flu like symptoms  Prior to Admission Medications   Prescriptions Last Dose Informant Patient Reported? Taking?    Prenatal Vit-Fe Fumarate-FA (PRENATAL VITAMIN) 27-0 8 MG TABS   Yes No   Sig: Take by mouth   al mag oxide-diphenhydramine-lidocaine viscous (MAGIC MOUTHWASH) 1:1:1 suspension   No No   Sig: Swish and spit 5 mL every 6 (six) hours as needed for mouth pain or discomfort   benzocaine-menthol-lanolin-aloe (DERMOPLAST) 20-0 5 % topical spray   No No   Sig: Apply 1 application topically 4 (four) times a day as needed for mild pain for up to 30 days   docusate sodium (COLACE) 100 mg capsule   No No   Sig: Take 1 capsule by mouth 2 (two) times a day for 30 days   hydrocortisone 1 % cream   No No   Sig: Apply 1 application topically as needed for irritation or rash for up to 30 days   ibuprofen (MOTRIN) 600 mg tablet   No No Sig: Take 1 tablet by mouth every 6 (six) hours as needed for mild pain for up to 30 days   ondansetron (ZOFRAN-ODT) 4 mg disintegrating tablet   No No   Sig: Take 1 tablet by mouth every 8 (eight) hours as needed for nausea or vomiting      Facility-Administered Medications: None       Past Medical History:   Diagnosis Date    Abnormal Pap smear of cervix     Anxiety     Depression     Hepatitis C     HPV (human papilloma virus) anogenital infection        Past Surgical History:   Procedure Laterality Date    KNEE SURGERY Left     MOUTH SURGERY         History reviewed  No pertinent family history  I have reviewed and agree with the history as documented  E-Cigarette/Vaping     E-Cigarette/Vaping Substances     Social History     Tobacco Use    Smoking status: Former Smoker     Packs/day: 0 25     Types: Cigarettes     Last attempt to quit: 11/9/2016     Years since quitting: 3 6    Smokeless tobacco: Never Used    Tobacco comment: current everyday smoker per [employer]   Substance Use Topics    Alcohol use: No    Drug use: Yes     Types: Heroin     Comment: last use - one week ago        Review of Systems   Constitutional: Positive for chills and fever  Negative for activity change, appetite change, diaphoresis, fatigue and unexpected weight change  HENT: Negative for ear discharge, ear pain, rhinorrhea, sinus pain and sore throat  Eyes: Negative for pain  Respiratory: Positive for shortness of breath  Negative for cough, chest tightness and wheezing  Cardiovascular: Negative for chest pain, palpitations and leg swelling  Gastrointestinal: Negative for abdominal distention, abdominal pain, constipation, diarrhea, nausea and vomiting  Genitourinary: Positive for dysuria, flank pain and urgency  Negative for decreased urine volume, difficulty urinating and hematuria  Musculoskeletal: Positive for back pain  Negative for myalgias, neck pain and neck stiffness     Skin: Negative for color change, pallor, rash and wound  Neurological: Negative for dizziness and numbness  Psychiatric/Behavioral: The patient is nervous/anxious  All other systems reviewed and are negative  Physical Exam  ED Triage Vitals   Temperature Pulse Respirations Blood Pressure SpO2   07/15/20 0859 07/15/20 0845 07/15/20 0847 07/15/20 0847 07/15/20 0847   98 °F (36 7 °C) (!) 120 (!) 24 133/83 93 %      Temp src Heart Rate Source Patient Position - Orthostatic VS BP Location FiO2 (%)   -- 07/15/20 0845 07/15/20 0847 07/15/20 0847 --    Monitor Sitting Right arm       Pain Score       07/15/20 0935       Worst Possible Pain             Orthostatic Vital Signs  Vitals:    07/15/20 0845 07/15/20 0847 07/15/20 0915 07/15/20 1021   BP:  133/83  133/83   Pulse: (!) 120  (!) 122 94   Patient Position - Orthostatic VS:  Sitting  Standing       Physical Exam   Constitutional: She is oriented to person, place, and time  She appears well-developed and well-nourished  Non-toxic appearance  She does not appear ill  She appears distressed  HENT:   Head: Normocephalic and atraumatic  Mouth/Throat: Oropharynx is clear and moist    Eyes: Pupils are equal, round, and reactive to light  EOM are normal    Neck: Normal range of motion  Neck supple  Cardiovascular: Tachycardia present  Pulmonary/Chest: Breath sounds normal  No stridor  Tachypnea noted  No respiratory distress  She has no decreased breath sounds  She has no wheezes  She has no rhonchi  She has no rales  Abdominal: Soft  Bowel sounds are normal  She exhibits no distension  There is no tenderness  There is CVA tenderness  There is no rebound and no guarding  Musculoskeletal:        Right lower leg: Normal  She exhibits no tenderness and no edema  Left lower leg: Normal  She exhibits no tenderness and no edema  Neurological: She is alert and oriented to person, place, and time  No cranial nerve deficit  Skin: Skin is warm and dry  No rash noted   No erythema  No pallor  Psychiatric: Her mood appears anxious  Nursing note and vitals reviewed  ED Medications  Medications   ceftriaxone (ROCEPHIN) 1 g/50 mL in dextrose IVPB (has no administration in time range)   sodium chloride 0 9 % bolus 1,000 mL (0 mL Intravenous Stopped 7/15/20 1253)   ketorolac (TORADOL) injection 30 mg (30 mg Intravenous Given 7/15/20 0935)   HYDROmorphone (DILAUDID) injection 0 5 mg (0 5 mg Intravenous Given 7/15/20 1018)   iohexol (OMNIPAQUE) 350 MG/ML injection (MULTI-DOSE) 100 mL (100 mL Intravenous Given 7/15/20 1156)   HYDROmorphone (DILAUDID) injection 0 5 mg (0 5 mg Intravenous Given 7/15/20 1253)       Diagnostic Studies  Results Reviewed     Procedure Component Value Units Date/Time    Urine culture [[id number]]     Lab Status:  No result Specimen:  Urine, Clean Catch     Novel Coronavirus [facility] [[id number]]  (Normal) Collected:  07/15/20 1018    Lab Status:  Final result Specimen:  Nares from Nose Updated:  07/15/20 1124     SARS-CoV-2 Negative    Narrative: The specimen collection materials, transport medium, and/or testing methodology utilized in the production of these test results have been proven to be reliable in a limited validation with an abbreviated program under the Emergency Utilization Authorization provided by the FDA  Testing reported as "Presumptive positive" will be confirmed with secondary testing with a reference laboratory to ensure result accuracy  Clinical caution and judgement should be used with the interpretation of these results with consideration of the clinical impression and other laboratory testing  Testing reported as "Positive" or "Negative" has been proven to be accurate according to standard laboratory validation requirements  All testing is performed with control materials showing appropriate reactivity at standard intervals        Urine Microscopic [[id number]]  (Abnormal) Collected:  07/15/20 0932    Lab Status: Final result Specimen:  Urine, Clean Catch Updated:  07/15/20 1052     RBC, UA None Seen /hpf      WBC, UA 4-10 /hpf      Epithelial Cells Occasional /hpf      Bacteria, UA Occasional /hpf     CBC and differential [[id number]]  (Abnormal) Collected:  07/15/20 0928    Lab Status:  Final result Specimen:  Blood from Arm, Right Updated:  07/15/20 1005     WBC 8 49 Thousand/uL      RBC 3 97 Million/uL      Hemoglobin 11 3 g/dL      Hematocrit 32 9 %      MCV 83 fL      MCH 28 5 pg      MCHC 34 3 g/dL      RDW 12 6 %      MPV 9 6 fL      Platelets 981 Thousands/uL      nRBC 0 /100 WBCs     Narrative: This is an appended report  These results have been appended to a previously verified report      UA (URINE) with reflex to Scope [[id number]]  (Abnormal) Collected:  07/15/20 0932    Lab Status:  Final result Specimen:  Urine, Clean Catch Updated:  07/15/20 0948     Color, UA Yellow     Clarity, UA Slightly Cloudy     Specific Gravity, UA 1 025     pH, UA 6 0     Leukocytes, UA Negative     Nitrite, UA Negative     Protein,  (2+) mg/dl      Glucose, UA Negative mg/dl      Ketones, UA Negative mg/dl      Urobilinogen, UA 1 0 E U /dl      Bilirubin, UA Negative     Blood, UA Small    Basic metabolic panel [[id number]]  (Abnormal) Collected:  07/15/20 0928    Lab Status:  Final result Specimen:  Blood from Arm, Right Updated:  07/15/20 0945     Sodium 125 mmol/L      Potassium 3 3 mmol/L      Chloride 90 mmol/L      CO2 22 mmol/L      ANION GAP 13 mmol/L      BUN 8 mg/dL      Creatinine 0 86 mg/dL      Glucose 144 mg/dL      Calcium 7 9 mg/dL      eGFR 93 ml/min/1 73sq m     Narrative:       Christian guidelines for Chronic Kidney Disease (CKD):     Stage 1 with normal or high GFR (GFR > 90 mL/min/1 73 square meters)    Stage 2 Mild CKD (GFR = 60-89 mL/min/1 73 square meters)    Stage 3A Moderate CKD (GFR = 45-59 mL/min/1 73 square meters)    Stage 3B Moderate CKD (GFR = 30-44 mL/min/1 73 square meters)    Stage 4 Severe CKD (GFR = 15-29 mL/min/1 73 square meters)    Stage 5 End Stage CKD (GFR <15 mL/min/1 73 square meters)  Note: GFR calculation is accurate only with a steady state creatinine    POCT pregnancy, urine [[id number]]  (Normal) Resulted:  07/15/20 0933    Lab Status:  Final result Updated:  07/15/20 0934     EXT PREG TEST UR (Ref: Negative) NEGATIVE     Control VALID                 CT abdomen pelvis with contrast   Final Result by Renetta Sims MD (07/15 1211)      1  Somewhat streaky opacification of the renal cortex bilaterally consistent with pyelonephritis, uncomplicated  2   Partial evaluation of a loculated right basilar pleural effusion with right middle lobe opacity likely reflecting atelectasis with infiltrate not excluded  Consider follow-up CT chest                Workstation performed: [id number]               Procedures  Procedures      ED Course  ED Course as of Jul 15 1256   Wed Jul 15, 2020   1008 Continued severe pain after 30mg Toradol, 0 5mg IV Dilaudid ordered      1128 Pain somewhat improved with 0 5mg IV dilaudid  Pt is still unable to lay flat on bed due to pain  0622 Uncomplicated pyelonephritis noted, pt started on 1000mg IV ceftriaxone  CT abdomen pelvis with contrast   1245 Na 125 started on NS   Sodium(!): 125                                           MDM  Number of Diagnoses or Management Options  Pyelonephritis:   Diagnosis management comments: 29-year-female with a past medical history of Hep C and heroine abuse reports to the ED with severe right sided back pain and shortness of breath  The patient reports that she believes she has a UTI  Recently of note, the patient was kicked out of her house last week by her mother, relapsed on heroine one time last week, and has been living out of her car  She is due to enter a rehab program today, however, she reported to the ED after her pain became unbearable    Onset was Monday 07/13/2020, pain is in her back on the right side, stabbing in nature, 10/10in severity  In the ED the patient was anxious and tachypenic, admitting to severe 10/10 pain  Exam was positive for CVA tenderness on the right side  30mg IM Toradol did was administered and did not help the pain  0 5mg IV Dilaudid administered and helped the pain somewhat  Lab workup showed sodium of 125 and the patient was started on NS, urinalysis showed cloudy urine, urine culture pending  CT abdomen and pelvis showed uncomplicated pyelonephritis  Pt started on 1000mg IV ceftriaxone and admitted to [facility] inpatient status for further treatment  An additional dose of 0 5mg IV dilaudid was provided as patient was still reporting severe pain  Disposition  Final diagnoses:   Pyelonephritis     Time reflects when diagnosis was documented in both MDM as applicable and the Disposition within this note     Time User Action Codes Description Comment    7/15/2020 12:39 PM Rubina Aragon Add [N12] Pyelonephritis       ED Disposition     ED Disposition Condition Date/Time Comment    Admit Stable Wed Jul 15, 2020 12:41 PM Case was discussed with Dr Kuldeep Styles and the patient's admission status was agreed to be Admission Status: inpatient status to the service of Dr Monty Infante  Follow-up Information    None         Patient's Medications   Discharge Prescriptions    No medications on file     No discharge procedures on file  PDMP Review     None           ED Provider  Attending physically available and evaluated Debbie Crenshaw I managed the patient along with the ED Attending      Electronically Signed by         SARAH Ochoa DO  07/15/20 4745

## 2020-07-15 NOTE — ED ATTENDING ATTESTATION
7/15/2020  IRosy MD, saw and evaluated the patient  I have discussed the patient with the resident/non-physician practitioner and agree with the resident's/non-physician practitioner's findings, Plan of Care, and MDM as documented in the resident's/non-physician practitioner's note, except where noted  All available labs and Radiology studies were reviewed  I was present for key portions of any procedure(s) performed by the resident/non-physician practitioner and I was immediately available to provide assistance  At this point I agree with the current assessment done in the Emergency Department  I have conducted an independent evaluation of this patient a history and physical is as follows:    ED Course         Critical Care Time  Procedures    Patient seen independently and in conjunction with the resident  CT scan verifying pyelonephritis  Was given IV Rocephin and IV narcotics and admitted to the hospitalist service

## 2020-07-15 NOTE — H&P
[facility]  H&P- Jaqui Done 1990, 34 y o  female MRN: [id number]  Unit/Bed#: S -10 Encounter: [id number]  Primary Care Provider: Julius Jones PA-C   Date and time admitted to hospital: 7/15/2020  8:41 AM    * Acute pyelonephritis  Assessment & Plan  · CT: Somewhat streaky opacification of the renal cortex bilaterally consistent with pyelonephritis, uncomplicated  · Urinalysis fairly unremarkable with no evidence of nitrates, 4-10 WBCs and occasional bacteria  · Urine culture ordered and pending  · Patient tachycardic and tachypneic on admission with significant CVA tenderness  · Status post IV ceftriaxone in the emergency department which will be continued pending urine culture  · Obtain procalcitonin    Loculated pleural effusion  Assessment & Plan  · CT on admission:  Partial evaluation of a loculated right basilar pleural effusion with right middle lobe opacity likely reflecting atelectasis with infiltrate not excluded  Consider follow-up CT chest   · Obtain procalcitonin  · Given patient's presenting symptoms of shortness of breath and right-sided back pain which would correlate to the location of the changes noted on this CT scan and urinalysis which was fairly unremarkable, would proceed with CT of the chest to further evaluate if these symptoms could be contributing  · Continue IV ceftriaxone which would cover for both potential pneumonia as well as pyelo/UTI    Sepsis ([facility] [address] )  Assessment & Plan  · Tachycardia and tachypnea noted on admission  · No leukocytosis and no fever  · Differential includes has sepsis related to acute infection from pyelonephritis vs  Related to loculated pleural effusion vs  PNA  Cannot rule out PE/septic emboli/bacteremia  · Blood cultures were NOT obtained prior to IV ABX   These will be obtained however of note the patient has received approx 750 mg of IV ceftriaxone prior to obtaining these  · Follow up on procalcitonin and urine culture    Dyspnea  Assessment & Plan  · Differential diagnosis includes related to loculated pleural effusion versus underlying pneumonia versus septic emboli versus pulmonary emboli vs  endocarditis in the setting of tachycardia, tachypnea, back pain and dyspnea  · Patient is noted to have chronic heroin abuse  Last used approximately 1 year ago until then relapsing one week ago  · Check CTA chest to rule out PE and to further evaluate loculated effusion  · Follow up on cultures and procalcitonin    Hyponatremia  Assessment & Plan  Lab Results   Component Value Date    SODIUM 125 (L) 07/15/2020    SODIUM 137 06/10/2019    SODIUM 135 (L) 11/20/2016     · Sodium noted to be 125  Previously 137 approximately 1 year ago  · Check TSH, uric acid, urine sodium, osmolality and urine osmolality  · Status post 1 L normal saline solution in the emergency department  Will obtain repeat BMP and determine further IV fluid resuscitation based on this  Heroin abuse ([facility] [address] )  Assessment & Plan  · Patient's history of heroin abuse - clean x 1 year then used 1 week ago (IV heroin)  · Prior endocarditis 2018    VTE Prophylaxis:  Low risk, ambulate   Code Status: FULL CODE  POLST: There is no POLST form on file for this patient (pre-hospital)  Discussion with family: patient    Anticipated Length of Stay:  Patient will be admitted on an Inpatient basis with an anticipated length of stay of  > 2 midnights  Justification for Hospital Stay: dyspnea, sepsis, hyponatremia, possible pyelo    Total Time for Visit, including Counseling / Coordination of Care: 1 hour  Greater than 50% of this total time spent on direct patient counseling and coordination of care  Chief Complaint:   SOB and back pain    History of Present Illness:    Verna Quintanilla is a 34 y o  female who presents with shortness of breath and right upper back pain for 3 days    The patient has past medical history of hepatitis-C, heroin abuse and presented to the emergency department with severe right-sided back pain and shortness of breath  She thought that she had a urinary tract infection  The patient is noted to currently be living out of her car and had recently relapsed on heroin and her mother kicked her out of the house  She was due to enter rehab program today however the pain became unbearable and therefore she came to the emergency department for further evaluation  She was noted to have CVA tenderness, tachycardia and tachypnea  She was noted to undergo CT of the abdomen and pelvis which showed bilateral possible pyelonephritis in the patient was also noted to have a sodium of 125  She was subsequently prompted for admission  The patient was given 1 g of IV ceftriaxone, normal saline solution, Toradol and Dilaudid  Review of Systems:    Review of Systems   Constitutional: Positive for activity change, appetite change and fatigue  Negative for chills, diaphoresis, fever and unexpected weight change  HENT: Negative for sore throat  Respiratory: Positive for shortness of breath  Negative for cough and chest tightness  Cardiovascular: Positive for chest pain  Negative for palpitations and leg swelling  Gastrointestinal: Negative for abdominal pain, diarrhea, nausea and vomiting  Genitourinary: Negative for dysuria  Musculoskeletal: Positive for gait problem and myalgias  Neurological: Positive for weakness  Negative for dizziness, syncope, numbness and headaches  Psychiatric/Behavioral: Negative for confusion  All other systems reviewed and are negative        Past Medical and Surgical History:     Past Medical History:   Diagnosis Date    Abnormal Pap smear of cervix     Anxiety     Depression     Endocarditis     2018    Hepatitis C     HPV (human papilloma virus) anogenital infection        Past Surgical History:   Procedure Laterality Date    KNEE SURGERY Left     MOUTH SURGERY         Meds/Allergies:    Prior to Admission medications    Medication Sig Start Date End Date Taking? Authorizing Provider   ariane Salgado Randolph oxide-diphenhydramine-lidocaine viscous (MAGIC MOUTHWASH) 1:1:1 suspension Swish and spit 5 mL every 6 (six) hours as needed for mouth pain or discomfort 6/11/19   Tata Martin MD   benzocaine-menthol-lanolin-aloe (DERMOPLAST) 20-0 5 % topical spray Apply 1 application topically 4 (four) times a day as needed for mild pain for up to 30 days 2/6/17 3/8/17  John Momo A Felix, DO   docusate sodium (COLACE) 100 mg capsule Take 1 capsule by mouth 2 (two) times a day for 30 days 2/6/17 3/8/17  John Shipmanin A Felix, DO   hydrocortisone 1 % cream Apply 1 application topically as needed for irritation or rash for up to 30 days 2/6/17 3/8/17  Brooksye KELVIN AlmanzarFelix, DO   ibuprofen (MOTRIN) 600 mg tablet Take 1 tablet by mouth every 6 (six) hours as needed for mild pain for up to 30 days 2/6/17 3/8/17  Stepterryye A Felix, DO   ondansetron (ZOFRAN-ODT) 4 mg disintegrating tablet Take 1 tablet by mouth every 8 (eight) hours as needed for nausea or vomiting 11/20/16   Sharmaine Gamino ,    Prenatal Vit-Fe Fumarate-FA (PRENATAL VITAMIN) 27-0 8 MG TABS Take by mouth    Historical Provider, MD     I have reviewed home medications with patient personally  Allergies:    Allergies   Allergen Reactions    Latex     Pollen Extract        Social History:     Marital Status: Single   Occupation:   Patient Pre-hospital Living Situation: kicked out of mothers home  Patient Pre-hospital Level of Mobility: no restrictions  Patient Pre-hospital Diet Restrictions: none  Substance Use History:   Social History     Substance and Sexual Activity   Alcohol Use No     Social History     Tobacco Use   Smoking Status Former Smoker    Packs/day: 0 25    Types: Cigarettes    Last attempt to quit: 11/9/2016    Years since quitting: 3 6   Smokeless Tobacco Never Used   Tobacco Comment    current everyday smoker per Elver Negron     Social History Substance and Sexual Activity   Drug Use Yes    Types: Heroin    Comment: last use - one week ago       Family History:    History reviewed  No pertinent family history  Physical Exam:     Vitals:   Blood Pressure: 133/83 (07/15/20 1021)  Pulse: 94 (07/15/20 1021)  Temperature: 98 °F (36 7 °C) (07/15/20 0859)  Respirations: 20 (07/15/20 1021)  SpO2: 96 % (07/15/20 1021)    Physical Exam   Constitutional: No distress  No acute distress  HENT:   Head: Normocephalic and atraumatic  Eyes: No scleral icterus  Cardiovascular: Normal rate, regular rhythm, normal heart sounds and intact distal pulses  No murmur heard  Pulmonary/Chest: Breath sounds normal  No accessory muscle usage  No respiratory distress  She has no wheezes  She has no rales  Right-sided posterior thorax tenderness  Decreased effort   Abdominal: Soft  Bowel sounds are normal  She exhibits no distension  There is no tenderness  There is no rigidity, no rebound and no guarding  Right-sided CVA tenderness   Musculoskeletal: She exhibits no edema  Neurological: She is alert  Skin: Skin is warm and dry  No rash noted  She is not diaphoretic  No erythema  Scattered abrasions and ecchymoses on the bilateral upper extremities   Psychiatric: She has a normal mood and affect  Her behavior is normal    Nursing note and vitals reviewed  Additional Data:     Lab Results: I have personally reviewed pertinent reports        Results from last 7 days   Lab Units 07/15/20  0928   WBC Thousand/uL 8 49   HEMOGLOBIN g/dL 11 3*   HEMATOCRIT % 32 9*   PLATELETS Thousands/uL 207   BANDS PCT % 6   LYMPHO PCT % 15   MONO PCT % 13*   EOS PCT % 0     Results from last 7 days   Lab Units 07/15/20  0928   SODIUM mmol/L 125*   POTASSIUM mmol/L 3 3*   CHLORIDE mmol/L 90*   CO2 mmol/L 22   BUN mg/dL 8   CREATININE mg/dL 0 86   ANION GAP mmol/L 13   CALCIUM mg/dL 7 9*   GLUCOSE RANDOM mg/dL 144*                       Imaging: I have personally reviewed pertinent reports  CT abdomen pelvis with contrast   Final Result by Elder Marte MD (07/15 1211)      1  Somewhat streaky opacification of the renal cortex bilaterally consistent with pyelonephritis, uncomplicated  2   Partial evaluation of a loculated right basilar pleural effusion with right middle lobe opacity likely reflecting atelectasis with infiltrate not excluded  Consider follow-up CT chest                Workstation performed: [id number]         CTA chest pe study    (Results Pending)       EKG, Pathology, and Other Studies Reviewed on Admission:   · Prior records    Allscripts / Epic Records Reviewed: Yes     ** Please Note: This note has been constructed using a voice recognition system   **]

## 2020-07-15 NOTE — ASSESSMENT & PLAN NOTE
Lab Results   Component Value Date    SODIUM 125 (L) 07/15/2020    SODIUM 137 06/10/2019    SODIUM 135 (L) 11/20/2016     · Sodium noted to be 125  Previously 137 approximately 1 year ago  · Check TSH, uric acid, urine sodium, osmolality and urine osmolality  · Status post 1 L normal saline solution in the emergency department  Will obtain repeat BMP and determine further IV fluid resuscitation based on this

## 2020-07-15 NOTE — ASSESSMENT & PLAN NOTE
· CT on admission:  Partial evaluation of a loculated right basilar pleural effusion with right middle lobe opacity likely reflecting atelectasis with infiltrate not excluded  Consider follow-up CT chest   · Obtain procalcitonin  · Given patient's presenting symptoms of shortness of breath and right-sided back pain which would correlate to the location of the changes noted on this CT scan and urinalysis which was fairly unremarkable, would proceed with CT of the chest to further evaluate if these symptoms could be contributing    · Continue IV ceftriaxone which would cover for both potential pneumonia as well as pyelo/UTI

## 2020-07-16 ENCOUNTER — APPOINTMENT (INPATIENT)
Dept: CT IMAGING | Facility: HOSPITAL | Age: 30
DRG: 720 | End: 2020-07-16
Payer: COMMERCIAL

## 2020-07-16 LAB
BACTERIA UR CULT: NORMAL
OSMOLALITY UR/SERPL-RTO: 270 MMOL/KG (ref 282–298)
PROCALCITONIN SERPL-MCNC: 2.63 NG/ML

## 2020-07-16 PROCEDURE — 71275 CT ANGIOGRAPHY CHEST: CPT

## 2020-07-16 PROCEDURE — 99232 SBSQ HOSP IP/OBS MODERATE 35: CPT | Performed by: PHYSICIAN ASSISTANT

## 2020-07-16 PROCEDURE — 99223 1ST HOSP IP/OBS HIGH 75: CPT | Performed by: INTERNAL MEDICINE

## 2020-07-16 RX ORDER — VANCOMYCIN HYDROCHLORIDE 1 G/200ML
15 INJECTION, SOLUTION INTRAVENOUS EVERY 12 HOURS
Status: DISCONTINUED | OUTPATIENT
Start: 2020-07-16 | End: 2020-07-16

## 2020-07-16 RX ORDER — LANOLIN ALCOHOL/MO/W.PET/CERES
6 CREAM (GRAM) TOPICAL
Status: DISCONTINUED | OUTPATIENT
Start: 2020-07-16 | End: 2020-07-22 | Stop reason: HOSPADM

## 2020-07-16 RX ORDER — POTASSIUM CHLORIDE 20 MEQ/1
40 TABLET, EXTENDED RELEASE ORAL ONCE
Status: COMPLETED | OUTPATIENT
Start: 2020-07-16 | End: 2020-07-16

## 2020-07-16 RX ADMIN — OXYCODONE AND ACETAMINOPHEN 1 TABLET: 5; 325 TABLET ORAL at 19:44

## 2020-07-16 RX ADMIN — KETOROLAC TROMETHAMINE 30 MG: 30 INJECTION, SOLUTION INTRAMUSCULAR at 20:43

## 2020-07-16 RX ADMIN — LIDOCAINE 1 PATCH: 50 PATCH TOPICAL at 08:14

## 2020-07-16 RX ADMIN — OXYCODONE AND ACETAMINOPHEN 1 TABLET: 5; 325 TABLET ORAL at 11:15

## 2020-07-16 RX ADMIN — CEFTRIAXONE SODIUM 1000 MG: 10 INJECTION, POWDER, FOR SOLUTION INTRAVENOUS at 11:15

## 2020-07-16 RX ADMIN — SODIUM CHLORIDE 75 ML/HR: 0.9 INJECTION, SOLUTION INTRAVENOUS at 11:17

## 2020-07-16 RX ADMIN — POTASSIUM CHLORIDE 40 MEQ: 1500 TABLET, EXTENDED RELEASE ORAL at 11:15

## 2020-07-16 RX ADMIN — KETOROLAC TROMETHAMINE 30 MG: 30 INJECTION, SOLUTION INTRAMUSCULAR at 14:25

## 2020-07-16 RX ADMIN — IOHEXOL 85 ML: 350 INJECTION, SOLUTION INTRAVENOUS at 12:29

## 2020-07-16 RX ADMIN — VANCOMYCIN HYDROCHLORIDE 1250 MG: 1 INJECTION, POWDER, LYOPHILIZED, FOR SOLUTION INTRAVENOUS at 16:17

## 2020-07-16 RX ADMIN — MELATONIN 6 MG: at 22:40

## 2020-07-16 RX ADMIN — OXYCODONE AND ACETAMINOPHEN 1 TABLET: 5; 325 TABLET ORAL at 06:08

## 2020-07-16 RX ADMIN — KETOROLAC TROMETHAMINE 30 MG: 30 INJECTION, SOLUTION INTRAMUSCULAR at 08:14

## 2020-07-16 NOTE — UTILIZATION REVIEW
Initial Clinical Review    Admission: Date/Time/Statement: Admission Orders (From admission, onward)     Ordered        07/15/20 1244  Inpatient Admission  Once                   Orders Placed This Encounter   Procedures    Inpatient Admission     Standing Status:   Standing     Number of Occurrences:   1     Order Specific Question:   Admitting Physician     Answer:   Hayden Best [[id number]]     Order Specific Question:   Level of Care     Answer:   Med Surg [[id number]]     Order Specific Question:   Estimated length of stay     Answer:   More than 2 Midnights     Order Specific Question:   Certification     Answer:   I certify that inpatient services are medically necessary for this patient for a duration of greater than two midnights  See H&P and MD Progress Notes for additional information about the patient's course of treatment  ED Arrival Information     Expected Arrival Acuity Means of Arrival Escorted By Service Admission Type    - 7/15/2020 08:39 Emergent [facility] Emergency    Arrival Complaint    Difficulty Breathing        Chief Complaint   Patient presents with    Shortness of Breath     c/o right upper back "stabbing pain" and sob x3 days  Assessment/Plan:  this is a 34year old female from car, is homeless,  to ED admitted inpatient due to acute pyelonephritis/loculed pleural effusion/sepsis/hyponatremia  History of heroin abuse, clean year with relapse last week  Presented due to severe right sided back pain and shortness of breath starting 7/13/2020  On exam distressed  Tachycardic, tachypnea  CVA tenderness  Urine culture sent  UA 4-10 WBC , 2+ protein     H&H 11 3/32 9  Na 125  K 3 3  Glucose 144  CT abdomen showed pyelonephritis, pleural effusion  Pain control with judicous use of narcotics,  IV antibiotics, IVF of 0 9% NSS in progress  CT chest, blood cultures  ordered       On 7/16/2020: sepsis with differential of pyelonephritis versus loculated pleural effusion versus pneumonia  Cannot rule out PE/septic emboli/bacteremia at this time with a history of prior heroin abuse  Hitesh Champagne Has continued pain, shortness of breath, difficulty taking deep breath  Febrile overnight to 100 8  Blood cultures obtained after received IV antibiotics  Continue IV antibiotics  ED Triage Vitals   Temperature Pulse Respirations Blood Pressure SpO2   07/15/20 0859 07/15/20 0845 07/15/20 0847 07/15/20 0847 07/15/20 0847   98 °F (36 7 °C) (!) 120 (!) 24 133/83 93 %      Temp Source Heart Rate Source Patient Position - Orthostatic VS BP Location FiO2 (%)   07/15/20 1427 07/15/20 0845 07/15/20 0847 07/15/20 0847 --   Oral Monitor Sitting Right arm       Pain Score       07/15/20 0935       Worst Possible Pain        Wt Readings from Last 1 Encounters:   07/15/20 72 1 kg (158 lb 15 2 oz)     Additional Vital Signs:   07/16/20 0700  97 8 °F (36 6 °C)  76  18  105/59  75  97 %  None (Room air)  Lying   07/15/20 2334  99 1 °F (37 3 °C)                 07/15/20 2222  100 8 °F (38 2 °C)Abnormal   104  18  119/62    98 %  None (Room air)  Sitting   07/15/20 1427  98 3 °F (36 8 °C)  89  16  127/68    100 %  None (Room air)  Sitting   07/15/20 1021    94  20  133/83    96 %  None (Room air)  Standing   07/15/20 0915    122Abnormal         98 %       07/15/20 0914              Nasal cannula      O2 Device: 2L at 07/15/20 0914       Pertinent Labs/Diagnostic Test Results:   7/15/2020 Ct abdomen - 1   Somewhat streaky opacification of the renal cortex bilaterally consistent with pyelonephritis, uncomplicated  2   Partial evaluation of a loculated right basilar pleural effusion with right middle lobe opacity likely reflecting atelectasis with infiltrate not excluded  7/16/2020 CTA chest No evidence of pulmonary embolism  Scattered nodules are seen some of which are more peripherally based and suggests the possibility of multifocal pneumonia or perhaps septic emboli  Metastatic disease is possible although less likely given the clinical circumstances  Echocardiography   may be useful   There is mediastinal adenopathy and splenomegaly  Neoplastic possibilities including lymphoma could also be considered  Atypical infection such as mycobacterial or fungal and be associated adenopathy and multifocal infiltrates  Loculated right pleural effusion  Infection is not excluded    No gas bubbles are seen however      Results from last 7 days   Lab Units 07/15/20  1018   SARS-COV-2  Negative     Results from last 7 days   Lab Units 07/15/20  0928   WBC Thousand/uL 8 49   HEMOGLOBIN g/dL 11 3*   HEMATOCRIT % 32 9*   PLATELETS Thousands/uL 207   BANDS PCT % 6     Results from last 7 days   Lab Units 07/15/20  2310 07/15/20  0928   SODIUM mmol/L 127* 125*   POTASSIUM mmol/L 3 3* 3 3*   CHLORIDE mmol/L 94* 90*   CO2 mmol/L 24 22   ANION GAP mmol/L 9 13   BUN mg/dL 12 8   CREATININE mg/dL 0 90 0 86   EGFR ml/min/1 73sq m 88 93   CALCIUM mg/dL 7 6* 7 9*     Results from last 7 days   Lab Units 07/15/20  2310 07/15/20  0928   GLUCOSE RANDOM mg/dL 108 144*     Results from last 7 days   Lab Units 07/15/20  2310   OSMOLALITY, SERUM mmol/*     Results from last 7 days   Lab Units 07/15/20  0928   TSH 3RD GENERATON uIU/mL 0 512     Results from last 7 days   Lab Units 07/15/20  2310   PROCALCITONIN ng/ml 2 63*     Results from last 7 days   Lab Units 07/15/20  0932   CLARITY UA  Slightly Cloudy   COLOR UA  Yellow   SPEC GRAV UA  1 025   PH UA  6 0   GLUCOSE UA mg/dl Negative   KETONES UA mg/dl Negative   BLOOD UA  Small*   PROTEIN UA mg/dl 100 (2+)*   NITRITE UA  Negative   BILIRUBIN UA  Negative   UROBILINOGEN UA E U /dl 1 0   LEUKOCYTES UA  Negative   WBC UA /hpf 4-10*   RBC UA /hpf None Seen   BACTERIA UA /hpf Occasional   EPITHELIAL CELLS WET PREP /hpf Occasional   SODIUM UR  6     Results from last 7 days   Lab Units 07/15/20  0932   AMPH/METH  Negative   BARBITURATE UR  Negative BENZODIAZEPINE UR  Negative   COCAINE UR  Positive*   METHADONE URINE  Negative   OPIATE UR  Positive*   PCP UR  Negative   THC UR  Positive*     Results from last 7 days   Lab Units 07/15/20  2316 07/15/20  2314   BLOOD CULTURE  Received in Microbiology Lab  Culture in Progress  Received in Microbiology Lab  Culture in Progress  Results from last 7 days   Lab Units 07/15/20  0928   TOTAL COUNTED  100     ED Treatment:   Medication Administration from 07/15/2020 0839 to 07/15/2020 1425       Date/Time Order Dose Route Action Comments     07/15/2020 0933 sodium chloride 0 9 % bolus 1,000 mL 1,000 mL Intravenous New Bag      07/15/2020 0935 ketorolac (TORADOL) injection 30 mg 30 mg Intravenous Given      07/15/2020 1018 HYDROmorphone (DILAUDID) injection 0 5 mg 0 5 mg Intravenous Given      07/15/2020 1258 ceftriaxone (ROCEPHIN) 1 g/50 mL in dextrose IVPB 1,000 mg Intravenous New Bag      07/15/2020 1253 HYDROmorphone (DILAUDID) injection 0 5 mg 0 5 mg Intravenous Given         Past Medical History:   Diagnosis Date    Abnormal Pap smear of cervix     Anxiety     Depression     Endocarditis     2018    Hepatitis C     HPV (human papilloma virus) anogenital infection      Present on Admission:  **None**    Admitting Diagnosis: SOB (shortness of breath) [R06 02]  Pyelonephritis [N12]  Age/Sex: 34 y o  female  Admission Orders: 7/15/2020 1244 inpatient   Scheduled Medications:  Medications:  cefTRIAXone 1,000 mg Intravenous Q24H   lidocaine 1 patch Topical Daily     Continuous IV Infusions:  sodium chloride 75 mL/hr Intravenous Continuous     PRN Meds:  acetaminophen 650 mg Oral Q6H PRN   ketorolac 15 mg Intravenous Q6H PRN   Or      Ketorolac - used x 3 (1703, 2332, 0814) 30 mg Intravenous Q6H PRN   ondansetron 4 mg Intravenous Q6H PRN   oxyCODONE-acetaminophen - used x 2  1 tablet Oral Q4H PRN     Aqua K     Network Utilization Review Department  [email or web address]  ATTENTION: Please call with any questions or concerns to [phone] and carefully listen to the prompts so that you are directed to the right person  All voicemails are confidential   [facility] all requests for admission clinical reviews, approved or denied determinations and any other requests to dedicated fax number below belonging to the campus where the patient is receiving treatment   List of dedicated fax numbers for the Facilities:  [address] DENIALS (Administrative/Medical Necessity) [phone]   [address] (Maternity/NICU/Pediatrics) [phone]   [employer] [phone]   [facility] [phone]   Nile Jamison [phone]   Viviana Anaya [phone]   [address] [place] [address] [phone]   [facility]  [phone]   [facility] [address]  [address] [facility] [address] [facility] [phone]

## 2020-07-16 NOTE — ASSESSMENT & PLAN NOTE
Lab Results   Component Value Date    SODIUM 127 (L) 07/15/2020    SODIUM 125 (L) 07/15/2020    SODIUM 137 06/10/2019     · Sodium noted to be 125 on admission  · TSH and uric acid within normal limits  · Serum osmolality 270, urine sodium 6  · Status post 1 L normal saline solution in the emergency department  Then started on maintenance fluid  Sodium increased to 127 however have been unable to obtain repeat BMP secondary to poor stick and patient refusal   · Midline to be placed and then will continue to obtain laboratory studies  Given improvement on fluids, I suspect related to volume depletion as patient had not been in taking large amount at home

## 2020-07-16 NOTE — ASSESSMENT & PLAN NOTE
· Tachycardia and tachypnea noted on admission  · Developed temp of 100 8° overnight  · Differential includes related to pyelonephritis versus loculated pleural effusion versus pneumonia  Cannot rule out PE/septic emboli/bacteremia at this time with a history of prior heroin abuse  · Blood cultures were NOT obtained prior to IV ABX  These were obtained however of note the patient received approx 750 mg of IV ceftriaxone prior to obtaining these  · Procalcitonin elevated  Trend repeat  · Follow-up on urine culture and blood cultures  · Obtain CT of the chest this morning

## 2020-07-16 NOTE — ASSESSMENT & PLAN NOTE
· CT: Somewhat streaky opacification of the renal cortex bilaterally consistent with pyelonephritis, uncomplicated  · Urinalysis fairly unremarkable with no evidence of nitrates, 4-10 WBCs and occasional bacteria  · Urine culture ordered and pending  · Patient tachycardic and tachypneic on admission with significant CVA tenderness  · Continue IV ceftriaxone  · Procalcitonin elevated    Continue to trend

## 2020-07-16 NOTE — PROGRESS NOTES
Vancomycin Assessment    Jaqui Ortega is a 34 y o  female who is currently receiving vancomycin 1000 mg IV q12h  for skin-soft tissue infection     Relevant clinical data and objective history reviewed:  Creatinine   Date Value Ref Range Status   07/15/2020 0 90 0 60 - 1 30 mg/dL Final     Comment:     Standardized to IDMS reference method   07/15/2020 0 86 0 60 - 1 30 mg/dL Final     Comment:     Standardized to IDMS reference method   06/10/2019 0 89 0 60 - 1 30 mg/dL Final     Comment:     Standardized to IDMS reference method     /59 (BP Location: Right arm)   Pulse 76   Temp 97 8 °F (36 6 °C) (Oral)   Resp 18   Ht 5' 5" (1 651 m)   Wt 72 1 kg (158 lb 15 2 oz)   SpO2 97%   BMI 26 45 kg/m²   No intake/output data recorded  Lab Results   Component Value Date/Time    BUN 12 07/15/2020 11:10 PM    WBC 8 49 07/15/2020 09:28 AM    WBC 8 4 07/26/2016 12:00 AM    HGB 11 3 (L) 07/15/2020 09:28 AM    HGB 13 3 03/17/2017 10:19 AM    HCT 32 9 (L) 07/15/2020 09:28 AM    HCT 36 1 10/27/2016 11:35 AM    MCV 83 07/15/2020 09:28 AM    MCV 90 07/26/2016 12:00 AM     07/15/2020 09:28 AM     07/26/2016 12:00 AM     Temp Readings from Last 3 Encounters:   07/16/20 97 8 °F (36 6 °C) (Oral)   06/10/19 97 8 °F (36 6 °C) (Temporal)   06/27/17 98 °F (36 7 °C) (Tympanic)     Vancomycin Days of Therapy: 1    Assessment/Plan  The patient is currently on vancomycin utilizing scheduled dosing based on actual body weight  Baseline risks associated with therapy include: concomitant nephrotoxic medications and dehydration  The patient is currently receiving 1000 mg IV q12h  and after clinical evaluation will be changed to 1250 mg IV q12h   Pharmacy will also follow closely for s/sx of nephrotoxicity, infusion reactions and appropriateness of therapy  BMP and CBC will be ordered per protocol  Plan for trough as patient approaches steady state, prior to the 5th  dose at approximately 026 848 14 90 on 07/18/20  Due to infection severity, will target a trough of 15-20 (appropriate for most indications)   Pharmacy will continue to follow the patients culture results and clinical progress daily      Pieter Florentino, Pharmacist

## 2020-07-16 NOTE — UTILIZATION REVIEW
Notification of Inpatient Admission/Inpatient Authorization Request   This is a Notification of Inpatient Admission for [address]  Be advised that this patient was admitted to our facility under Inpatient Status  Contact Nick Madison at [phone] for additional admission information  [facility]  DEDICATED -[phone]  Patient Name:   Jaqui Ortega   YOB: 1990       [address]   [address]:   Shan Bautista  Tax ID: [id number]  NPI: [id number] Attending Provider/NPI:  Address:  Phone: Anjana Leon [[id number]]  Same as the facility  [phone]   Place of Service Code: 24 Place of Service Name:  [address] [place] [address]   Start Date: 7/15/20 1243     Discharge Date & Time: No discharge date for patient encounter  Type of Admission: Inpatient Status Discharge Disposition   (if discharged): Home/Self Care   Patient Diagnoses: SOB (shortness of breath) [R06 02]  Pyelonephritis [N12]     Orders: Admission Orders (From admission, onward)     Ordered        07/15/20 1244  Inpatient Admission  Once                    Assigned Utilization Review Contact: Nick Madison  Utilization   Network Utilization Review Department  Phone: [phone]; Fax [phone]  Email: [email or web address]   ATTENTION PAYERS: Please call the assigned Utilization  directly with any questions or concerns ALL voicemails in the department are confidential  Send all requests for admission clinical reviews, approved or denied determinations and any other requests to dedicated fax number belonging to the campus where the patient is receiving treatment

## 2020-07-16 NOTE — ASSESSMENT & PLAN NOTE
· Differential diagnosis includes related to loculated pleural effusion versus underlying pneumonia versus septic emboli versus pulmonary emboli vs  endocarditis in the setting of tachycardia, tachypnea, back pain and dyspnea  · Patient is noted to have chronic heroin abuse  Last used approximately 1 year ago until then relapsing one week ago    · Check CTA chest to rule out PE and to further evaluate loculated effusion - this will be performed today given had contrast yesterday  · Follow up on cultures and trend procalcitonin

## 2020-07-16 NOTE — ASSESSMENT & PLAN NOTE
· CT on admission:  Partial evaluation of a loculated right basilar pleural effusion with right middle lobe opacity likely reflecting atelectasis with infiltrate not excluded  Consider follow-up CT chest   · Procalcitonin elevated  Unclear source of infection is related to pyelonephritis versus pulmonary pathology  · Given patient's presenting symptoms of shortness of breath and right-sided back pain which would correlate to the location of the changes noted on the abdominal CT scan and given urinalysis which was fairly unremarkable, would proceed with CT of the chest to further evaluate if these symptoms could be contributing    Given had contrast yesterday, repeat CT scan will be performed this morning  · Continue IV ceftriaxone which would cover for both potential pneumonia as well as pyelo/UTI
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
## 2020-07-16 NOTE — PROGRESS NOTES
[facility]  Progress Note - Evonne Camarillo 1990, 34 y o  female MRN: [id number]  Unit/Bed#: S -34 Encounter: [id number]  Primary Care Provider: Brandi Solorio PA-C   Date and time admitted to hospital: 7/15/2020  8:41 AM    * Sepsis ([address] )  Assessment & Plan  · Tachycardia and tachypnea noted on admission  · Developed temp of 100 8° overnight  · Differential includes related to pyelonephritis versus loculated pleural effusion versus pneumonia  Cannot rule out PE/septic emboli/bacteremia at this time with a history of prior heroin abuse  · Blood cultures were NOT obtained prior to IV ABX  These were obtained however of note the patient received approx 750 mg of IV ceftriaxone prior to obtaining these  · Procalcitonin elevated  Trend repeat  · Follow-up on urine culture and blood cultures  · Obtain CT of the chest this morning  Acute pyelonephritis  Assessment & Plan  · CT: Somewhat streaky opacification of the renal cortex bilaterally consistent with pyelonephritis, uncomplicated  · Urinalysis fairly unremarkable with no evidence of nitrates, 4-10 WBCs and occasional bacteria  · Urine culture ordered and pending  · Patient tachycardic and tachypneic on admission with significant CVA tenderness  · Continue IV ceftriaxone  · Procalcitonin elevated  Continue to trend      Loculated pleural effusion  Assessment & Plan  · CT on admission:  Partial evaluation of a loculated right basilar pleural effusion with right middle lobe opacity likely reflecting atelectasis with infiltrate not excluded  Consider follow-up CT chest   · Procalcitonin elevated    Unclear source of infection is related to pyelonephritis versus pulmonary pathology  · Given patient's presenting symptoms of shortness of breath and right-sided back pain which would correlate to the location of the changes noted on the abdominal CT scan and given urinalysis which was fairly unremarkable, would proceed with CT of the chest to further evaluate if these symptoms could be contributing  Given had contrast yesterday, repeat CT scan will be performed this morning  · Continue IV ceftriaxone which would cover for both potential pneumonia as well as pyelo/UTI    Dyspnea  Assessment & Plan  · Differential diagnosis includes related to loculated pleural effusion versus underlying pneumonia versus septic emboli versus pulmonary emboli vs  endocarditis in the setting of tachycardia, tachypnea, back pain and dyspnea  · Patient is noted to have chronic heroin abuse  Last used approximately 1 year ago until then relapsing one week ago  · Check CTA chest to rule out PE and to further evaluate loculated effusion - this will be performed today given had contrast yesterday  · Follow up on cultures and trend procalcitonin    Hyponatremia  Assessment & Plan  Lab Results   Component Value Date    SODIUM 127 (L) 07/15/2020    SODIUM 125 (L) 07/15/2020    SODIUM 137 06/10/2019     · Sodium noted to be 125 on admission  · TSH and uric acid within normal limits  · Serum osmolality 270, urine sodium 6  · Status post 1 L normal saline solution in the emergency department  Then started on maintenance fluid  Sodium increased to 127 however have been unable to obtain repeat BMP secondary to poor stick and patient refusal   · Midline to be placed and then will continue to obtain laboratory studies  Given improvement on fluids, I suspect related to volume depletion as patient had not been in taking large amount at home  Heroin abuse ([facility] [address] )  Assessment & Plan  · Patient's history of heroin abuse - clean x 1 year then used 1 week ago (IV heroin)  · No signs of withdrawal at this time  · Limit opioids  Did receive Dilaudid in the emergency department  Discussed with patient that we will give only minimal opioids  · Started on Percocet last evening with significant improvement in her pain    Continue on Toradol on low-dose Percocet as needed  · Prior endocarditis 2018    VTE Pharmacologic Prophylaxis:   Pharmacologic: low risk, ambulate  Mechanical VTE Prophylaxis in Place: Yes    Patient Centered Rounds: I have performed bedside rounds with nursing staff today  [address] with Specialists or Other Care Team Provider: nursing    Education and Discussions with Family / Patient: patient    Time Spent for Care: 30 minutes  More than 50% of total time spent on counseling and coordination of care as described above  Current Length of Stay: 1 day(s)    Current Patient Status: Inpatient   Certification Statement: The patient will continue to require additional inpatient hospital stay due to IV ABX    Discharge Plan: not stable  Await CT scan, continue IV antibiotics    Code Status: Level 1 - Full Code      Subjective:   No nausea or vomiting  Pain is improving however still present  Appears much more comfortable today  Still having difficulty with deep inspirations and some shortness of breath  Objective:     Vitals:   Temp (24hrs), Av °F (37 2 °C), Min:97 8 °F (36 6 °C), Max:100 8 °F (38 2 °C)    Temp:  [97 8 °F (36 6 °C)-100 8 °F (38 2 °C)] 97 8 °F (36 6 °C)  HR:  [] 76  Resp:  [16-18] 18  BP: (105-127)/(59-68) 105/59  SpO2:  [97 %-100 %] 97 %  Body mass index is 26 45 kg/m²  Input and Output Summary (last 24 hours):     No intake or output data in the 24 hours ending 20 1104    Physical Exam:     Physical Exam   Constitutional: She appears well-developed and well-nourished  No distress  HENT:   Head: Normocephalic and atraumatic  Eyes: No scleral icterus  Cardiovascular: Normal rate, regular rhythm, normal heart sounds and intact distal pulses  No murmur heard  Pulmonary/Chest: Effort normal  No accessory muscle usage  No respiratory distress  She has no wheezes  She has no rales  She exhibits tenderness  Right-sided posterior thorax tenderness  Decreased breath sounds at the right base  Abdominal: Soft  Bowel sounds are normal  She exhibits no distension  There is no tenderness  There is no rigidity, no rebound and no guarding  CVA tenderness bilaterally   Musculoskeletal: She exhibits no edema  Neurological: She is alert  Skin: Skin is warm and dry  No rash noted  She is not diaphoretic  No erythema  Scattered abrasions noted on bilateral upper extremity   Psychiatric: She has a normal mood and affect  Her behavior is normal    Nursing note and vitals reviewed  Additional Data:     Labs:    Results from last 7 days   Lab Units 07/15/20  0928   WBC Thousand/uL 8 49   HEMOGLOBIN g/dL 11 3*   HEMATOCRIT % 32 9*   PLATELETS Thousands/uL 207   BANDS PCT % 6   LYMPHO PCT % 15   MONO PCT % 13*   EOS PCT % 0     Results from last 7 days   Lab Units 07/15/20  2310   SODIUM mmol/L 127*   POTASSIUM mmol/L 3 3*   CHLORIDE mmol/L 94*   CO2 mmol/L 24   BUN mg/dL 12   CREATININE mg/dL 0 90   ANION GAP mmol/L 9   CALCIUM mg/dL 7 6*   GLUCOSE RANDOM mg/dL 108                 Results from last 7 days   Lab Units 07/15/20  2310   PROCALCITONIN ng/ml 2 63*           * I Have Reviewed All Lab Data Listed Above  * Additional Pertinent Lab Tests Reviewed: [facility] Admission Reviewed    Imaging:    Imaging Reports Reviewed Today Include: CT  Imaging Personally Reviewed by Myself Includes:  none    Recent Cultures (last 7 days):     Results from last 7 days   Lab Units 07/15/20  2316 07/15/20  2314   BLOOD CULTURE  Received in Microbiology Lab  Culture in Progress  Received in Microbiology Lab  Culture in Progress         Last 24 Hours Medication List:     Current Facility-Administered Medications:  acetaminophen 650 mg Oral Q6H PRN Kesha Chen PA-C    cefTRIAXone 1,000 mg Intravenous Q24H BOO Lyles    ketorolac 15 mg Intravenous Q6H PRN Kesha Chen PA-C    Or        ketorolac 30 mg Intravenous Q6H PRN Kesha Chen PA-C    lidocaine 1 patch Topical Daily Viridiana Guthrie MILLIE Chen    ondansetron 4 mg Intravenous Q6H PRN Kesha Chen PA-C    oxyCODONE-acetaminophen 1 tablet Oral Q4H PRN Ilene Hernandez PA-C    potassium chloride 40 mEq Oral Once Kesha Chen PA-C    sodium chloride 75 mL/hr Intravenous Continuous Kesha Chen PA-C Last Rate: 75 mL/hr (07/15/20 1707)        Today, Patient Was Seen By: Alicia Underwood PA-C    ** Please Note: Dictation voice to text software may have been used in the creation of this document   **

## 2020-07-16 NOTE — ASSESSMENT & PLAN NOTE
· Patient's history of heroin abuse - clean x 1 year then used 1 week ago (IV heroin)  · No signs of withdrawal at this time  · Limit opioids  Did receive Dilaudid in the emergency department  Discussed with patient that we will give only minimal opioids  · Started on Percocet last evening with significant improvement in her pain    Continue on Toradol on low-dose Percocet as needed  · Prior endocarditis 2018

## 2020-07-17 ENCOUNTER — APPOINTMENT (INPATIENT)
Dept: NON INVASIVE DIAGNOSTICS | Facility: HOSPITAL | Age: 30
DRG: 720 | End: 2020-07-17
Payer: COMMERCIAL

## 2020-07-17 PROBLEM — B95.61 BACTEREMIA DUE TO STAPHYLOCOCCUS AUREUS: Status: ACTIVE | Noted: 2020-07-17

## 2020-07-17 PROBLEM — R78.81 BACTEREMIA DUE TO STAPHYLOCOCCUS AUREUS: Status: ACTIVE | Noted: 2020-07-17

## 2020-07-17 LAB
ANION GAP SERPL CALCULATED.3IONS-SCNC: 9 MMOL/L (ref 4–13)
BUN SERPL-MCNC: 6 MG/DL (ref 5–25)
CALCIUM SERPL-MCNC: 8.2 MG/DL (ref 8.3–10.1)
CHLORIDE SERPL-SCNC: 104 MMOL/L (ref 100–108)
CO2 SERPL-SCNC: 24 MMOL/L (ref 21–32)
CREAT SERPL-MCNC: 0.74 MG/DL (ref 0.6–1.3)
GFR SERPL CREATININE-BSD FRML MDRD: 111 ML/MIN/1.73SQ M
GLUCOSE SERPL-MCNC: 100 MG/DL (ref 65–140)
HIV 1+2 AB+HIV1 P24 AG SERPL QL IA: NORMAL
POTASSIUM SERPL-SCNC: 3.6 MMOL/L (ref 3.5–5.3)
PROCALCITONIN SERPL-MCNC: 1.25 NG/ML
SODIUM SERPL-SCNC: 137 MMOL/L (ref 136–145)

## 2020-07-17 PROCEDURE — 80048 BASIC METABOLIC PNL TOTAL CA: CPT | Performed by: PHYSICIAN ASSISTANT

## 2020-07-17 PROCEDURE — 99233 SBSQ HOSP IP/OBS HIGH 50: CPT | Performed by: INTERNAL MEDICINE

## 2020-07-17 PROCEDURE — 84145 PROCALCITONIN (PCT): CPT | Performed by: PHYSICIAN ASSISTANT

## 2020-07-17 PROCEDURE — 99233 SBSQ HOSP IP/OBS HIGH 50: CPT | Performed by: NURSE PRACTITIONER

## 2020-07-17 PROCEDURE — 87389 HIV-1 AG W/HIV-1&-2 AB AG IA: CPT | Performed by: INTERNAL MEDICINE

## 2020-07-17 RX ORDER — CEFAZOLIN SODIUM 2 G/50ML
2000 SOLUTION INTRAVENOUS EVERY 8 HOURS
Status: DISCONTINUED | OUTPATIENT
Start: 2020-07-17 | End: 2020-07-22 | Stop reason: HOSPADM

## 2020-07-17 RX ORDER — KETOROLAC TROMETHAMINE 30 MG/ML
30 INJECTION, SOLUTION INTRAMUSCULAR; INTRAVENOUS EVERY 6 HOURS PRN
Status: DISPENSED | OUTPATIENT
Start: 2020-07-17 | End: 2020-07-19

## 2020-07-17 RX ORDER — KETOROLAC TROMETHAMINE 30 MG/ML
15 INJECTION, SOLUTION INTRAMUSCULAR; INTRAVENOUS EVERY 6 HOURS PRN
Status: ACTIVE | OUTPATIENT
Start: 2020-07-17 | End: 2020-07-19

## 2020-07-17 RX ADMIN — KETOROLAC TROMETHAMINE 30 MG: 30 INJECTION, SOLUTION INTRAMUSCULAR at 22:28

## 2020-07-17 RX ADMIN — KETOROLAC TROMETHAMINE 30 MG: 30 INJECTION, SOLUTION INTRAMUSCULAR at 10:40

## 2020-07-17 RX ADMIN — ACETAMINOPHEN 650 MG: 325 TABLET, FILM COATED ORAL at 04:18

## 2020-07-17 RX ADMIN — OXYCODONE AND ACETAMINOPHEN 1 TABLET: 5; 325 TABLET ORAL at 18:16

## 2020-07-17 RX ADMIN — KETOROLAC TROMETHAMINE 30 MG: 30 INJECTION, SOLUTION INTRAMUSCULAR at 04:37

## 2020-07-17 RX ADMIN — KETOROLAC TROMETHAMINE 15 MG: 30 INJECTION, SOLUTION INTRAMUSCULAR at 16:18

## 2020-07-17 RX ADMIN — OXYCODONE AND ACETAMINOPHEN 1 TABLET: 5; 325 TABLET ORAL at 07:50

## 2020-07-17 RX ADMIN — SODIUM CHLORIDE 75 ML/HR: 0.9 INJECTION, SOLUTION INTRAVENOUS at 04:35

## 2020-07-17 RX ADMIN — MELATONIN 6 MG: at 22:28

## 2020-07-17 RX ADMIN — VANCOMYCIN HYDROCHLORIDE 1250 MG: 1 INJECTION, POWDER, LYOPHILIZED, FOR SOLUTION INTRAVENOUS at 16:18

## 2020-07-17 RX ADMIN — CEFAZOLIN SODIUM 2000 MG: 2 SOLUTION INTRAVENOUS at 18:17

## 2020-07-17 RX ADMIN — OXYCODONE AND ACETAMINOPHEN 1 TABLET: 5; 325 TABLET ORAL at 12:39

## 2020-07-17 RX ADMIN — VANCOMYCIN HYDROCHLORIDE 1250 MG: 1 INJECTION, POWDER, LYOPHILIZED, FOR SOLUTION INTRAVENOUS at 05:03

## 2020-07-17 RX ADMIN — OXYCODONE AND ACETAMINOPHEN 1 TABLET: 5; 325 TABLET ORAL at 01:49

## 2020-07-17 NOTE — PLAN OF CARE
Problem: PAIN - ADULT  Goal: Verbalizes/displays adequate comfort level or baseline comfort level  Description  Interventions:  - Encourage patient to monitor pain and request assistance  - Assess pain using appropriate pain scale  - Administer analgesics based on type and severity of pain and evaluate response  - Implement non-pharmacological measures as appropriate and evaluate response  - Consider cultural and social influences on pain and pain management  - Notify physician/advanced practitioner if interventions unsuccessful or patient reports new pain  Outcome: Progressing     Problem: INFECTION - ADULT  Goal: Absence or prevention of progression during hospitalization  Description  INTERVENTIONS:  - Assess and monitor for signs and symptoms of infection  - Monitor lab/diagnostic results  - Monitor all insertion sites, i e  indwelling lines, tubes, and drains  - Monitor endotracheal if appropriate and nasal secretions for changes in amount and color  - [place] appropriate cooling/warming therapies per order  - Administer medications as ordered  - Instruct and encourage patient and family to use good hand hygiene technique  - Identify and instruct in appropriate isolation precautions for identified infection/condition  Outcome: Progressing  Goal: Absence of fever/infection during neutropenic period  Description  INTERVENTIONS:  - Monitor WBC    Outcome: Progressing     Problem: SAFETY ADULT  Goal: Patient will remain free of falls  Description  INTERVENTIONS:  - Assess patient frequently for physical needs  -  Identify cognitive and physical deficits and behaviors that affect risk of falls    -  [place] fall precautions as indicated by assessment   - Educate patient/family on patient safety including physical limitations  - Instruct patient to call for assistance with activity based on assessment  - Modify environment to reduce risk of injury  - Consider OT/PT consult to assist with strengthening/mobility  Outcome: Progressing  Goal: Maintain or return to baseline ADL function  Description  INTERVENTIONS:  -  Assess patient's ability to carry out ADLs; assess patient's baseline for ADL function and identify physical deficits which impact ability to perform ADLs (bathing, care of mouth/teeth, toileting, grooming, dressing, etc )  - Assess/evaluate cause of self-care deficits   - Assess range of motion  - Assess patient's mobility; develop plan if impaired  - Assess patient's need for assistive devices and provide as appropriate  - Encourage maximum independence but intervene and supervise when necessary  - Involve family in performance of ADLs  - Assess for home care needs following discharge   - Consider OT consult to assist with ADL evaluation and planning for discharge  - Provide patient education as appropriate  Outcome: Progressing  Goal: Maintain or return mobility status to optimal level  Description  INTERVENTIONS:  - Assess patient's baseline mobility status (ambulation, transfers, stairs, etc )    - Identify cognitive and physical deficits and behaviors that affect mobility  - Identify mobility aids required to assist with transfers and/or ambulation (gait belt, sit-to-stand, lift, walker, cane, etc )  - [place] fall precautions as indicated by assessment  - Record patient progress and toleration of activity level on Mobility SBAR; progress patient to next Phase/Stage  - Instruct patient to call for assistance with activity based on assessment  - Consider rehabilitation consult to assist with strengthening/weightbearing, etc   Outcome: Progressing     Problem: DISCHARGE PLANNING  Goal: Discharge to home or other facility with appropriate resources  Description  INTERVENTIONS:  - Identify barriers to discharge w/patient and caregiver  - Arrange for needed discharge resources and transportation as appropriate  - Identify discharge learning needs (meds, wound care, etc )  - Arrange for interpretive services to assist at discharge as needed  - Refer to Case Management Department for coordinating discharge planning if the patient needs post-hospital services based on physician/advanced practitioner order or complex needs related to functional status, cognitive ability, or social support system  Outcome: Progressing     Problem: Knowledge Deficit  Goal: Patient/family/caregiver demonstrates understanding of disease process, treatment plan, medications, and discharge instructions  Description  Complete learning assessment and assess knowledge base    Interventions:  - Provide teaching at level of understanding  - Provide teaching via preferred learning methods  Outcome: Progressing

## 2020-07-17 NOTE — ASSESSMENT & PLAN NOTE
· Tachycardia and tachypnea noted on admission  · Differential includes related to pyelonephritis versus loculated pleural effusion versus pneumonia  Cannot rule out PE/septic emboli/bacteremia at this time with a history of prior heroin abuse  · Ceftriaxone dc'ed, start Ancef and continue Vanco   · Procalcitonin elevated    Trend repeat  · Follow-up on urine culture  · Blood cultures gram + clusters  · Patient afebrile since July 15, 2020

## 2020-07-17 NOTE — ASSESSMENT & PLAN NOTE
· CT on admission:  Partial evaluation of a loculated right basilar pleural effusion with right middle lobe opacity likely reflecting atelectasis with infiltrate not excluded    Consider follow-up CT chest   · Continue treatment will consider IR consult for possible drainage

## 2020-07-17 NOTE — PROGRESS NOTES
Vancomycin IV Pharmacy-to-Dose Consultation    Rachel Carlos is a 34 y o  female who is currently receiving Vancomycin IV with management by the Pharmacy Consult service  Assessment/Plan:  The patient was reviewed  Renal function is stable and no signs or symptoms of nephrotoxicity and/or infusion reactions were documented in the chart  Based on todays assessment, continue current vancomycin (day # 2) dosing of 1250mg IV q12 hours, with a plan for trough to be drawn at 1445 on 7/18/2020  We will continue to follow the patients culture results and clinical progress daily      Dangelo Law, Pharmacist

## 2020-07-17 NOTE — PLAN OF CARE
Problem: PAIN - ADULT  Goal: Verbalizes/displays adequate comfort level or baseline comfort level  Description  Interventions:  - Encourage patient to monitor pain and request assistance  - Assess pain using appropriate pain scale  - Administer analgesics based on type and severity of pain and evaluate response  - Implement non-pharmacological measures as appropriate and evaluate response  - Consider cultural and social influences on pain and pain management  - Notify physician/advanced practitioner if interventions unsuccessful or patient reports new pain  Outcome: Not Progressing     Problem: INFECTION - ADULT  Goal: Absence or prevention of progression during hospitalization  Description  INTERVENTIONS:  - Assess and monitor for signs and symptoms of infection  - Monitor lab/diagnostic results  - Monitor all insertion sites, i e  indwelling lines, tubes, and drains  - Monitor endotracheal if appropriate and nasal secretions for changes in amount and color  - [place] appropriate cooling/warming therapies per order  - Administer medications as ordered  - Instruct and encourage patient and family to use good hand hygiene technique  - Identify and instruct in appropriate isolation precautions for identified infection/condition  Outcome: Progressing  Goal: Absence of fever/infection during neutropenic period  Description  INTERVENTIONS:  - Monitor WBC    Outcome: Progressing     Problem: SAFETY ADULT  Goal: Patient will remain free of falls  Description  INTERVENTIONS:  - Assess patient frequently for physical needs  -  Identify cognitive and physical deficits and behaviors that affect risk of falls    -  [place] fall precautions as indicated by assessment   - Educate patient/family on patient safety including physical limitations  - Instruct patient to call for assistance with activity based on assessment  - Modify environment to reduce risk of injury  - Consider OT/PT consult to assist with strengthening/mobility  Outcome: Progressing  Goal: Maintain or return to baseline ADL function  Description  INTERVENTIONS:  -  Assess patient's ability to carry out ADLs; assess patient's baseline for ADL function and identify physical deficits which impact ability to perform ADLs (bathing, care of mouth/teeth, toileting, grooming, dressing, etc )  - Assess/evaluate cause of self-care deficits   - Assess range of motion  - Assess patient's mobility; develop plan if impaired  - Assess patient's need for assistive devices and provide as appropriate  - Encourage maximum independence but intervene and supervise when necessary  - Involve family in performance of ADLs  - Assess for home care needs following discharge   - Consider OT consult to assist with ADL evaluation and planning for discharge  - Provide patient education as appropriate  Outcome: Progressing  Goal: Maintain or return mobility status to optimal level  Description  INTERVENTIONS:  - Assess patient's baseline mobility status (ambulation, transfers, stairs, etc )    - Identify cognitive and physical deficits and behaviors that affect mobility  - Identify mobility aids required to assist with transfers and/or ambulation (gait belt, sit-to-stand, lift, walker, cane, etc )  - [place] fall precautions as indicated by assessment  - Record patient progress and toleration of activity level on Mobility SBAR; progress patient to next Phase/Stage  - Instruct patient to call for assistance with activity based on assessment  - Consider rehabilitation consult to assist with strengthening/weightbearing, etc   Outcome: Progressing     Problem: DISCHARGE PLANNING  Goal: Discharge to home or other facility with appropriate resources  Description  INTERVENTIONS:  - Identify barriers to discharge w/patient and caregiver  - Arrange for needed discharge resources and transportation as appropriate  - Identify discharge learning needs (meds, wound care, etc )  - Arrange for interpretive services to assist at discharge as needed  - Refer to Case Management Department for coordinating discharge planning if the patient needs post-hospital services based on physician/advanced practitioner order or complex needs related to functional status, cognitive ability, or social support system  Outcome: Progressing     Problem: Knowledge Deficit  Goal: Patient/family/caregiver demonstrates understanding of disease process, treatment plan, medications, and discharge instructions  Description  Complete learning assessment and assess knowledge base    Interventions:  - Provide teaching at level of understanding  - Provide teaching via preferred learning methods  Outcome: Progressing

## 2020-07-17 NOTE — ASSESSMENT & PLAN NOTE
· CT: Somewhat streaky opacification of the renal cortex bilaterally consistent with pyelonephritis, uncomplicated  · Urinalysis fairly unremarkable with no evidence of nitrates, 4-10 WBCs and occasional bacteria  · Urine culture  pending  · Patient tachycardic and tachypneic on admission with significant CVA tenderness  · Continue IV ceftriaxone  · Procalcitonin elevated    Continue to trend

## 2020-07-17 NOTE — ASSESSMENT & PLAN NOTE
Lab Results   Component Value Date    SODIUM 137 07/17/2020    SODIUM 127 (L) 07/15/2020    SODIUM 125 (L) 07/15/2020     · Sodium noted to be 125 on admission  · TSH and uric acid within normal limits  · Serum osmolality 270, urine sodium 6  · Status post 1 L normal saline solution in the emergency department  Then started on maintenance fluid  Sodium increased to 127 however have been unable to obtain repeat BMP secondary to poor stick and patient refusal   · Midline to be placed and then will continue to obtain laboratory studies  Given improvement on fluids, I suspect related to volume depletion as patient had not been in taking large amount at home    · Now resolved

## 2020-07-17 NOTE — PROGRESS NOTES
[facility]   Progress Note - Lj Mendoza 1990, 34 y o  female MRN: [id number]    Unit/Bed#: S -34 Encounter: [id number]    Primary Care Provider: Duke Reich PA-C   Date and time admitted to hospital: 7/15/2020  8:41 AM        * Sepsis ([address] )  Assessment & Plan  · Tachycardia and tachypnea noted on admission  · Developed temp of 100 8° overnight  · Differential includes related to pyelonephritis versus loculated pleural effusion versus pneumonia  Cannot rule out PE/septic emboli/bacteremia at this time with a history of prior heroin abuse  · Blood cultures were NOT obtained prior to IV ABX  These were obtained however of note the patient received approx 750 mg of IV ceftriaxone prior to obtaining these  · Procalcitonin elevated  Trend repeat  · Follow-up on urine culture  · Blood cultures gram + clusters  · ID following      Loculated pleural effusion  Assessment & Plan  · CT on admission:  Partial evaluation of a loculated right basilar pleural effusion with right middle lobe opacity likely reflecting atelectasis with infiltrate not excluded  Consider follow-up CT chest   · Procalcitonin elevated  Unclear source of infection is related to pyelonephritis versus pulmonary pathology  · Given patient's presenting symptoms of shortness of breath and right-sided back pain which would correlate to the location of the changes noted on the abdominal CT scan and given urinalysis which was fairly unremarkable, would proceed with CT of the chest to further evaluate if these symptoms could be contributing    Given had contrast yesterday, repeat CT scan will be performed this morning  · Continue IV ceftriaxone which would cover for both potential pneumonia as well as pyelo/UTI    Acute pyelonephritis  Assessment & Plan  · CT: Somewhat streaky opacification of the renal cortex bilaterally consistent with pyelonephritis, uncomplicated  · Urinalysis fairly unremarkable with no evidence of nitrates, 4-10 WBCs and occasional bacteria  · Urine culture  pending  · Patient tachycardic and tachypneic on admission with significant CVA tenderness  · Continue IV ceftriaxone  · Procalcitonin elevated  Continue to trend      Heroin abuse ([address] )  Assessment & Plan  · Patient's history of heroin abuse - clean x 1 year then used 1 week ago (IV heroin)  · No signs of withdrawal at this time  · Limit opioids  Did receive Dilaudid in the emergency department  Discussed with patient that we will give only minimal opioids  · Started on Percocet last evening with significant improvement in her pain  Continue on Toradol on low-dose Percocet as needed  · Prior endocarditis 2018    Hyponatremia  Assessment & Plan  Lab Results   Component Value Date    SODIUM 137 07/17/2020    SODIUM 127 (L) 07/15/2020    SODIUM 125 (L) 07/15/2020     · Sodium noted to be 125 on admission  · TSH and uric acid within normal limits  · Serum osmolality 270, urine sodium 6  · Status post 1 L normal saline solution in the emergency department  Then started on maintenance fluid  Sodium increased to 127 however have been unable to obtain repeat BMP secondary to poor stick and patient refusal   · Midline to be placed and then will continue to obtain laboratory studies  Given improvement on fluids, I suspect related to volume depletion as patient had not been in taking large amount at home  · Now resolved    Dyspnea  Assessment & Plan  · Differential diagnosis includes related to loculated pleural effusion versus underlying pneumonia versus septic emboli versus pulmonary emboli vs  endocarditis in the setting of tachycardia, tachypnea, back pain and dyspnea  · Patient is noted to have chronic heroin abuse  Last used approximately 1 year ago until then relapsing one week ago    · Check CTA chest to rule out PE and to further evaluate loculated effusion - this will be performed today given had contrast yesterday  · Follow up on cultures and trend procalcitonin      VTE Pharmacologic Prophylaxis:   Pharmacologic: low risk ambulate   Mechanical VTE Prophylaxis in Place: No    Patient Centered Rounds: I have performed bedside rounds with nursing staff today  Discussions with Specialists or Other Care Team Provider: none    Education and Discussions with Family / Patient: patient    Time Spent for Care: 20 minutes  More than 50% of total time spent on counseling and coordination of care as described above  Current Length of Stay: 2 day(s)    Current Patient Status: Inpatient   Certification Statement: The patient will continue to require additional inpatient hospital stay due to further work up and monitoring of sepsis and pyleonephritis     Discharge Plan: pending improvement    Code Status: Level 1 - Full Code      Subjective:   Patient reports feeling fatigued this morning  Continues with right sided low back pain and diaphoresis  Currently no trouble breathing  Objective:     Vitals:   Temp (24hrs), Av 8 °F (36 6 °C), Min:97 6 °F (36 4 °C), Max:98 °F (36 7 °C)    Temp:  [97 6 °F (36 4 °C)-98 °F (36 7 °C)] 97 9 °F (36 6 °C)  HR:  [70-90] 70  Resp:  [18-20] 20  BP: (109-124)/(58-73) 109/58  SpO2:  [95 %-99 %] 95 %  Body mass index is 26 45 kg/m²  Input and Output Summary (last 24 hours): Intake/Output Summary (Last 24 hours) at 2020 1357  Last data filed at 2020 1245  Gross per 24 hour   Intake 1880 ml   Output    Net 1880 ml       Physical Exam:     Physical Exam   Constitutional: She is oriented to person, place, and time  She appears well-developed and well-nourished  HENT:   Head: Normocephalic and atraumatic  Neck: Normal range of motion  Neck supple  Cardiovascular: Normal rate and regular rhythm  No murmur heard  Pulmonary/Chest: Effort normal and breath sounds normal  No respiratory distress  Abdominal: Soft   Bowel sounds are normal    Right CVA tenderness   Musculoskeletal: Normal range of motion  She exhibits no edema  Neurological: She is alert and oriented to person, place, and time  Skin: Skin is warm and dry  Diaphoretic    Psychiatric: She has a normal mood and affect  Her behavior is normal  Judgment and thought content normal          Additional Data:     Labs:    Results from last 7 days   Lab Units 07/15/20  0928   WBC Thousand/uL 8 49   HEMOGLOBIN g/dL 11 3*   HEMATOCRIT % 32 9*   PLATELETS Thousands/uL 207   BANDS PCT % 6   LYMPHO PCT % 15   MONO PCT % 13*   EOS PCT % 0     Results from last 7 days   Lab Units 07/17/20  0425   SODIUM mmol/L 137   POTASSIUM mmol/L 3 6   CHLORIDE mmol/L 104   CO2 mmol/L 24   BUN mg/dL 6   CREATININE mg/dL 0 74   ANION GAP mmol/L 9   CALCIUM mg/dL 8 2*   GLUCOSE RANDOM mg/dL 100                 Results from last 7 days   Lab Units 07/17/20  0425 07/15/20  2310   PROCALCITONIN ng/ml 1 25* 2 63*           * I Have Reviewed All Lab Data Listed Above  * Additional Pertinent Lab Tests Reviewed:  [facility] Admission Reviewed    Imaging:    Imaging Reports Reviewed Today Include: echo pending  Imaging Personally Reviewed by Myself Includes:  none    Recent Cultures (last 7 days):     Results from last 7 days   Lab Units 07/15/20  2316 07/15/20  2314 07/15/20  0932   BLOOD CULTURE  Staphylococcus aureus*  --   --    GRAM STAIN RESULT  Gram positive cocci in clusters* Gram positive cocci in clusters*  --    URINE CULTURE   --   --  40,000-49,000 cfu/ml        Last 24 Hours Medication List:     Current Facility-Administered Medications:  acetaminophen 650 mg Oral Q6H PRN Kesha Chen PA-C    ketorolac 15 mg Intravenous Q6H PRN Kesha Chen PA-C    Or        ketorolac 30 mg Intravenous Q6H PRN Kesha Chen PA-C    lidocaine 1 patch Topical Daily Kesha Chen PA-C    melatonin 6 mg Oral HS Stephon Blancas PA-C    ondansetron 4 mg Intravenous Q6H PRN Kesha Chen PA-C    oxyCODONE-acetaminophen 1 tablet Oral Q4H PRN [facility]    sodium chloride 75 mL/hr Intravenous Continuous Kesha Chen PA-C Last Rate: 75 mL/hr (07/17/20 4071)   vancomycin 17 5 mg/kg Intravenous Q12H Jayshree Musa PA-C Last Rate: 1,250 mg (07/17/20 8918)        Today, Patient Was Seen By: BOO Babin    ** Please Note: Dictation voice to text software may have been used in the creation of this document   **

## 2020-07-17 NOTE — ASSESSMENT & PLAN NOTE
· Tachycardia and tachypnea noted on admission  · Developed temp of 100 8° overnight  · Differential includes related to pyelonephritis versus loculated pleural effusion versus pneumonia  Cannot rule out PE/septic emboli/bacteremia at this time with a history of prior heroin abuse  · Blood cultures were NOT obtained prior to IV ABX  These were obtained however of note the patient received approx 750 mg of IV ceftriaxone prior to obtaining these  · Procalcitonin elevated    Trend repeat  · Follow-up on urine culture  · Blood cultures gram + clusters  · ID following

## 2020-07-17 NOTE — PROGRESS NOTES
Progress Note - Infectious Disease   Nikky Colindres 34 y o  female MRN: [id number]  Unit/Bed#: S -01 Encounter: [id number]      Impression/Plan:  1  Sepsis  Developing soon on admission with tachycardia and fever  In setting of thoracic back pain and shortness of breath with subjective fever and chills pre-admission with CT scan findings concerning for septic emboli in patient with history of endocarditis and recent IV drug use 7/8/20  Blood cultures now + Staph aureus  Rec:  · Discontinue ceftriaxone  · Continue vancomycin pending final blood cultures and clinical follow-up  · Follow-up blood cultures  · Repeat blood cultures in am  · Check TTE   · Monitor temperature and hemodynamics  · Monitor CBC  · Additional supportive care per primary care team     2   Staph aureus bacteremia  Two of 2 blood cultures positive for gram-positive cocci in clusters and preliminarily identifying as Staph aureus  TTE pending  Given history of MSSA bacteremia and possible endocarditis in 2018 and current + blood cultures, at risk for endocarditis  · Continue vancomycin pending final blood cultures and clinical follow-up  · Follow-up blood cultures  · Repeat blood cultures in am  · Check TTE     3  Abnormal CTA chest  Likely secondary to #2 Scattered nodules peripherally consistent with septic emboli and  Loculated right pleural effusion  In setting as above  Rec:  · Continue Vancomycin pending final blood cultures and clinical follow-up  · Follow-up blood cultures  · Check TTE   · Monitor temperature and hemodynamics  · Monitor CBC  · Consult IR for evaluation/management of loculated pleural effusion      4  Right hand cellulitis  Mild tender induration and pink erythema at track site right hand  No fluctuance to suggest abscess  Likely portal of entry to #2  Rec:  · Continue antibiotics as above  · Hand elevation  · Serial exam     5    Abnormal CT A/P  CT abdomen pelvis:  Streaky renal cortical enhancement bilaterally  In setting of probable pulmonary septic emboli  UA bland, urine cx with mixed contaminants and without  clinical findings of UTI/pyelonephritis  Rec:  · Continue antibiotic as above  · Follow-up blood cultures  · Serial exam   · Monitor urinary symptoms     6  MSSA bacteremia/possible Endocarditis 2018  Patient had 1 of 2 blood cultures positive for MSSA on 11/25/2018 11/27/18 MELISSA showed questionable "small linear echodensity  "  Repeat MELISSA done on 12/4 shows "no definite evidence of endocarditis," and "previously visualized echodensity on anterior leaflet not identified "  Patient completed 15 days of IV Cefazolin per ID at [facility]  Patient was to have surveillance blood cultures 1-2 weeks after antibiotics complete, but these do not appear to have been obtained at [facility]  Rec:  · Empiric antibiotics as above  · Follow-up blood cultures  · Close clinical follow-up  · Follow-up TTE     7  IVDU:  Recent IVDU 07/08/2020 and Urine toxicology screen was positive for THC, cocaine, and opiates  At risk for recurrent endocarditis with septic emboli   Rec:  · Empiric antibiotics as above  · Follow-up blood cultures  · Serial exam   · Pain management per primary care team      8  Hepatitis C:  Untreated as of yet  Patient did not follow-up with [facility] hepatitis clinic as advised  HIV screen pending     Antibiotics:  Vancomycin D2     Above impression and plan discussed in detail with patient, RN, and primary care team     Subjective:  Patient agitated, restless in bed, complaining of pain with deep breath, diaphoresis, poor sleep, mentions leaving because pain uncontrolled but reports she does not want to get high      ROS: Patient has no fever, chills, but severe sweats; no nausea, vomiting, diarrhea; no cough, + shortness of breath; + pain    Objective:  Vitals:  Temp:  [97 6 °F (36 4 °C)-98 °F (36 7 °C)] 97 9 °F (36 6 °C)  HR:  [70-90] 70  Resp:  [18-20] 20  BP: (109-124)/(58-73) 109/58  SpO2: [95 %-99 %] 95 %  Temp (24hrs), Av 8 °F (36 6 °C), Min:97 6 °F (36 4 °C), Max:98 °F (36 7 °C)  Current: Temperature: 97 9 °F (36 6 °C)    General Appearance:  Awake, alert, restless in bed, adjusting position and pillow several times, agitated, diaphoretic  Throat: Oropharynx moist without lesions  Lungs:   Clear to auscultation bilaterally; no wheezes, rhonchi or rales; short statements, + pleuritic right posterior thoracic pain and decreased breath sounds  Heart:  RRR; S1-S2 heard, no murmur   Abdomen:   Soft, non-tender, non-distended, positive bowel sounds  Extremities: No edema, arm IV site nontender, swelling dorsal right hand > left at scabbed track sites without palpable tenderness     Skin: No rashes      Labs, Imaging, & Other studies:   All pertinent labs and imaging studies were personally reviewed  Results from last 7 days   Lab Units 07/15/20  0928   WBC Thousand/uL 8 49   HEMOGLOBIN g/dL 11 3*   PLATELETS Thousands/uL 207     Results from last 7 days   Lab Units 20  0425   POTASSIUM mmol/L 3 6   CHLORIDE mmol/L 104   CO2 mmol/L 24   BUN mg/dL 6   CREATININE mg/dL 0 74   EGFR ml/min/1 73sq m 111   CALCIUM mg/dL 8 2*     Results from last 7 days   Lab Units 20  0425 07/15/20  2310   PROCALCITONIN ng/ml 1 25* 2 63*     Results from last 7 days   Lab Units 07/15/20  2316 07/15/20  2314 07/15/20  0932   BLOOD CULTURE  Staphylococcus aureus*  --   --    GRAM STAIN RESULT  Gram positive cocci in clusters* Gram positive cocci in clusters*  --    URINE CULTURE   --   --  40,000-49,000 cfu/ml

## 2020-07-17 NOTE — ASSESSMENT & PLAN NOTE
· CT: Somewhat streaky opacification of the renal cortex bilaterally consistent with pyelonephritis, uncomplicated  · Urinalysis fairly unremarkable with no evidence of nitrates, 4-10 WBCs and occasional bacteria    · Urine culture  pending  · Patient tachycardic and tachypneic on admission with significant CVA tenderness  · Continue antibiotics per Infectious Disease  · Urine culture next contaminant not completely clear this is actual pyelonephritis   · patient is clinically improved

## 2020-07-18 PROBLEM — R06.00 DYSPNEA: Status: RESOLVED | Noted: 2020-07-15 | Resolved: 2020-07-18

## 2020-07-18 PROBLEM — E87.1 HYPONATREMIA: Status: RESOLVED | Noted: 2020-07-15 | Resolved: 2020-07-18

## 2020-07-18 LAB
ANION GAP SERPL CALCULATED.3IONS-SCNC: 11 MMOL/L (ref 4–13)
BASOPHILS # BLD MANUAL: 0 THOUSAND/UL (ref 0–0.1)
BASOPHILS NFR MAR MANUAL: 0 % (ref 0–1)
BUN SERPL-MCNC: 6 MG/DL (ref 5–25)
CALCIUM SERPL-MCNC: 7.6 MG/DL (ref 8.3–10.1)
CHLORIDE SERPL-SCNC: 106 MMOL/L (ref 100–108)
CO2 SERPL-SCNC: 20 MMOL/L (ref 21–32)
CREAT SERPL-MCNC: 0.58 MG/DL (ref 0.6–1.3)
EOSINOPHIL # BLD MANUAL: 0.04 THOUSAND/UL (ref 0–0.4)
EOSINOPHIL NFR BLD MANUAL: 1 % (ref 0–6)
ERYTHROCYTE [DISTWIDTH] IN BLOOD BY AUTOMATED COUNT: 13.3 % (ref 11.6–15.1)
GFR SERPL CREATININE-BSD FRML MDRD: 127 ML/MIN/1.73SQ M
GLUCOSE SERPL-MCNC: 98 MG/DL (ref 65–140)
HCT VFR BLD AUTO: 28.6 % (ref 34.8–46.1)
HGB BLD-MCNC: 9.6 G/DL (ref 11.5–15.4)
LYMPHOCYTES # BLD AUTO: 0.97 THOUSAND/UL (ref 0.6–4.47)
LYMPHOCYTES # BLD AUTO: 22 % (ref 14–44)
MCH RBC QN AUTO: 28.8 PG (ref 26.8–34.3)
MCHC RBC AUTO-ENTMCNC: 33.6 G/DL (ref 31.4–37.4)
MCV RBC AUTO: 86 FL (ref 82–98)
MONOCYTES # BLD AUTO: 0.31 THOUSAND/UL (ref 0–1.22)
MONOCYTES NFR BLD: 7 % (ref 4–12)
MYELOCYTES NFR BLD MANUAL: 1 % (ref 0–1)
NEUTROPHILS # BLD MANUAL: 2.66 THOUSAND/UL (ref 1.85–7.62)
NEUTS BAND NFR BLD MANUAL: 1 % (ref 0–8)
NEUTS SEG NFR BLD AUTO: 59 % (ref 43–75)
NRBC BLD AUTO-RTO: 0 /100 WBCS
PLATELET # BLD AUTO: 242 THOUSANDS/UL (ref 149–390)
PLATELET BLD QL SMEAR: ADEQUATE
PMV BLD AUTO: 9.6 FL (ref 8.9–12.7)
POTASSIUM SERPL-SCNC: 3.7 MMOL/L (ref 3.5–5.3)
PROCALCITONIN SERPL-MCNC: 0.57 NG/ML
RBC # BLD AUTO: 3.33 MILLION/UL (ref 3.81–5.12)
SODIUM SERPL-SCNC: 137 MMOL/L (ref 136–145)
TOTAL CELLS COUNTED SPEC: 100
VANCOMYCIN TROUGH SERPL-MCNC: 8.8 UG/ML (ref 10–20)
VARIANT LYMPHS # BLD AUTO: 9 %
WBC # BLD AUTO: 4.43 THOUSAND/UL (ref 4.31–10.16)

## 2020-07-18 PROCEDURE — 84145 PROCALCITONIN (PCT): CPT | Performed by: NURSE PRACTITIONER

## 2020-07-18 PROCEDURE — 80202 ASSAY OF VANCOMYCIN: CPT | Performed by: INTERNAL MEDICINE

## 2020-07-18 PROCEDURE — 99232 SBSQ HOSP IP/OBS MODERATE 35: CPT | Performed by: INTERNAL MEDICINE

## 2020-07-18 PROCEDURE — 99233 SBSQ HOSP IP/OBS HIGH 50: CPT | Performed by: INTERNAL MEDICINE

## 2020-07-18 PROCEDURE — 87040 BLOOD CULTURE FOR BACTERIA: CPT | Performed by: INTERNAL MEDICINE

## 2020-07-18 PROCEDURE — 85007 BL SMEAR W/DIFF WBC COUNT: CPT | Performed by: INTERNAL MEDICINE

## 2020-07-18 PROCEDURE — 85027 COMPLETE CBC AUTOMATED: CPT | Performed by: INTERNAL MEDICINE

## 2020-07-18 PROCEDURE — 80048 BASIC METABOLIC PNL TOTAL CA: CPT | Performed by: NURSE PRACTITIONER

## 2020-07-18 RX ADMIN — VANCOMYCIN HYDROCHLORIDE 1250 MG: 1 INJECTION, POWDER, LYOPHILIZED, FOR SOLUTION INTRAVENOUS at 04:03

## 2020-07-18 RX ADMIN — KETOROLAC TROMETHAMINE 30 MG: 30 INJECTION, SOLUTION INTRAMUSCULAR at 04:29

## 2020-07-18 RX ADMIN — OXYCODONE AND ACETAMINOPHEN 1 TABLET: 5; 325 TABLET ORAL at 08:36

## 2020-07-18 RX ADMIN — SODIUM CHLORIDE 75 ML/HR: 0.9 INJECTION, SOLUTION INTRAVENOUS at 04:34

## 2020-07-18 RX ADMIN — CEFAZOLIN SODIUM 2000 MG: 2 SOLUTION INTRAVENOUS at 17:42

## 2020-07-18 RX ADMIN — OXYCODONE AND ACETAMINOPHEN 1 TABLET: 5; 325 TABLET ORAL at 01:12

## 2020-07-18 RX ADMIN — SODIUM CHLORIDE 75 ML/HR: 0.9 INJECTION, SOLUTION INTRAVENOUS at 17:14

## 2020-07-18 RX ADMIN — CEFAZOLIN SODIUM 2000 MG: 2 SOLUTION INTRAVENOUS at 08:54

## 2020-07-18 RX ADMIN — KETOROLAC TROMETHAMINE 30 MG: 30 INJECTION, SOLUTION INTRAMUSCULAR at 17:11

## 2020-07-18 RX ADMIN — MELATONIN 6 MG: at 21:15

## 2020-07-18 RX ADMIN — KETOROLAC TROMETHAMINE 30 MG: 30 INJECTION, SOLUTION INTRAMUSCULAR at 11:19

## 2020-07-18 RX ADMIN — CEFAZOLIN SODIUM 2000 MG: 2 SOLUTION INTRAVENOUS at 01:08

## 2020-07-18 RX ADMIN — LIDOCAINE 1 PATCH: 50 PATCH TOPICAL at 08:36

## 2020-07-18 RX ADMIN — OXYCODONE AND ACETAMINOPHEN 1 TABLET: 5; 325 TABLET ORAL at 14:07

## 2020-07-18 RX ADMIN — VANCOMYCIN HYDROCHLORIDE 1250 MG: 1 INJECTION, POWDER, LYOPHILIZED, FOR SOLUTION INTRAVENOUS at 15:58

## 2020-07-18 RX ADMIN — OXYCODONE AND ACETAMINOPHEN 1 TABLET: 5; 325 TABLET ORAL at 21:15

## 2020-07-18 NOTE — ASSESSMENT & PLAN NOTE
Problem: Infant Inpatient Plan of Care  Goal: Plan of Care Review  Outcome: Ongoing (interventions implemented as appropriate)  Plan of care for night including feedings and safety discussed with parents.        · Patient's history of heroin abuse - clean x 1 year then used 1 week ago (IV heroin)  · No signs of withdrawal at this time  · Limit opioids  Did receive Dilaudid in the emergency department  Discussed with patient that we will give only minimal opioids  · Started on Percocet last evening with significant improvement in her pain    Continue on Toradol on low-dose Percocet as needed  · Prior endocarditis 2018

## 2020-07-18 NOTE — PROGRESS NOTES
Was informed by the primary RN that pt's bloodwork had clotted  Went in to try again, pt refusing- said "I have a stomachache and they had just tried me, can you please give me some time " Will come back in half hour to attempt

## 2020-07-18 NOTE — PROGRESS NOTES
Progress Note - Infectious Disease   Gleda Ahumada 34 y o  female MRN: [id number]  Unit/Bed#: S -01 Encounter: [id number]      Impression/Recommendations:  1   Sepsis   Developing soon on admission with tachycardia and fever   In setting of thoracic back pain and shortness of breath with subjective fever and chills pre-admission with CT scan findings concerning for septic emboli in patient with history of endocarditis and recent IV drug use 7/8/20  Blood cultures now + Staph aureus    Rec:  · Antibiotic plan as in below  · Monitor temperature and hemodynamics  · Monitor CBC  · Additional supportive care per primary care team     2   Staph aureus bacteremia  Two of 2 blood cultures positive for gram-positive cocci in clusters and preliminarily identifying as Staph aureus  TTE pending  Given history of MSSA bacteremia and possible endocarditis in 2018 and current + blood cultures, at risk for endocarditis  · Continue vancomycin and cefazolin for now  · Follow-up ID and susceptibilities of Staph aureus in blood cultures  · Repeat blood cultures today  · Follow up TTE  Patient may need MELISSA      3  Probable septic pulmonary emboli with loculated right pleural effusion  Given significant right-sided pleuritic chest pain, patient should get thoracentesis to assess for empyema  Rec:  · Antibiotic plan as in above   · Monitor temperature and hemodynamics  · Monitor right-sided chest pain  · Recommend IR evaluation for thoracentesis      4  Right hand cellulitis   Mild tender induration and pink erythema at track site right hand   No fluctuance to suggest abscess  Likely portal of entry to #2  Patient is clinically improved    Rec:  · Continue antibiotics as above  · Hand elevation  · Serial exam     5   Abnormal CT A/P  CT abdomen pelvis:  Streaky renal cortical enhancement bilaterally   In setting of probable pulmonary septic emboli   UA bland, urine cx with mixed contaminants and without  clinical findings of UTI/pyelonephritis  Rec:  · Continue antibiotic as above  · Serial exam   · Monitor urinary symptoms     6   MSSA bacteremia/possible Endocarditis    Patient had 1 of 2 blood cultures positive for MSSA on 2018 MELISSA showed questionable "small linear echodensity  "  Repeat MELISSA done on  shows "no definite evidence of endocarditis," and "previously visualized echodensity on anterior leaflet not identified  "  Patient completed 15 days of IV Cefazolin per ID at [facility]   Patient was to have surveillance blood cultures 1-2 weeks after antibiotics complete, but these do not appear to have been obtained at [facility]  Rec:  · Antibiotics as above  · Close clinical follow-up  · Follow-up TTE     7   IVDU:  Recent IVDU 2020 and Urine toxicology screen was positive for THC, cocaine, and opiates   At risk for recurrent endocarditis with septic emboli   Rec:  · Antibiotics as above  · Serial exam   · Pain management per primary care team      8   Hepatitis C:  Untreated as of yet   Patient did not follow-up with [facility] as advised  HIV screen pending    Discussed with patient in detail regarding the above plan  Antibiotics:  Vancomycin/cefazolin  Antibiotic # 3     Subjective:  Patient complains of right-sided pleuritic chest pain  Otherwise, she is comfortable  Right hand pain mild and improving  Temperature stays down  No chills  She is tolerating antibiotic well  No nausea, vomiting or diarrhea  No dizziness or hearing loss  Objective:  Vitals:  Temp:  [98 °F (36 7 °C)-98 6 °F (37 °C)] 98 °F (36 7 °C)  HR:  [75-90] 75  Resp:  [18-20] 18  BP: (110-126)/(58-77) 118/77  SpO2:  [97 %-98 %] 98 %  Temp (24hrs), Av 2 °F (36 8 °C), Min:98 °F (36 7 °C), Max:98 6 °F (37 °C)  Current: Temperature: 98 °F (36 7 °C)    Physical Exam:     General: Awake, alert, cooperative, no distress  Neck:  Supple  No mass  No lymphadenopathy     Lungs: Decreased breath sounds on right, right basilar rales, no wheezing, respirations unlabored  Heart:  Regular rate and rhythm, S1 and S2 normal, no murmur  Abdomen: Soft, nondistended, non-tender, bowel sounds active all four quadrants, no masses, no organomegaly  Extremities: Improved hand edema  No erythema/warmth  No draining ulcer  Minimal tenderness  Skin:  No rash  Neuro: Moves all extremities  Invasive Devices     Peripheral Intravenous Line            Peripheral IV 07/17/20 Left;Ventral (anterior) Forearm 1 day                Labs studies:   I have personally reviewed pertinent labs  Results from last 7 days   Lab Units 07/18/20  0903 07/17/20  0425 07/15/20  2310   POTASSIUM mmol/L 3 7 3 6 3 3*   CHLORIDE mmol/L 106 104 94*   CO2 mmol/L 20* 24 24   BUN mg/dL 6 6 12   CREATININE mg/dL 0 58* 0 74 0 90   EGFR ml/min/1 73sq m 127 111 88   CALCIUM mg/dL 7 6* 8 2* 7 6*     Results from last 7 days   Lab Units 07/18/20  1059 07/15/20  0928   WBC Thousand/uL 4 43 8 49   HEMOGLOBIN g/dL 9 6* 11 3*   PLATELETS Thousands/uL 242 207     Results from last 7 days   Lab Units 07/15/20  2316 07/15/20  2314 07/15/20  0932   BLOOD CULTURE  Staphylococcus aureus* Staphylococcus aureus*  --    GRAM STAIN RESULT  Gram positive cocci in clusters* Gram positive cocci in clusters*  --    URINE CULTURE   --   --  40,000-49,000 cfu/ml        Imaging Studies:   I have personally reviewed pertinent imaging study reports and images in PACS  EKG, Pathology, and Other Studies:   I have personally reviewed pertinent reports

## 2020-07-18 NOTE — PROGRESS NOTES
Vancomycin IV Pharmacy-to-Dose Consultation    Francisco Crowe is a 34 y o  female who is currently receiving Vancomycin IV with management by the Pharmacy Consult service  Assessment/Plan:  The patient was reviewed  Renal function is stable and no signs or symptoms of nephrotoxicity and/or infusion reactions were documented in the chart  Based on todays assessment, patient is sub-therapeutic with a trough of 8 8 when the goal is 15-20  We will plan to continue current vancomycin (day # 3) dosing of 1250 mg and change the interval to every 8 hours from every 12 hours, with a plan for trough to be drawn at 1530 on 07/19/20 prior to the 4th dose  We will continue to follow the patients culture results and clinical progress daily      Vaishnavi Pendleton, Pharmacist

## 2020-07-18 NOTE — PROGRESS NOTES
Progress Note - Patricia Law 1990, 34 y o  female MRN: [id number]    Unit/Bed#: S -18 Encounter: [id number]    Primary Care Provider: Estrella Rivers PA-C   Date and time admitted to hospital: 7/15/2020  8:41 AM        * Sepsis [facility])  Assessment & Plan  · Tachycardia and tachypnea noted on admission  · Differential includes related to pyelonephritis versus loculated pleural effusion versus pneumonia  Cannot rule out PE/septic emboli/bacteremia at this time with a history of prior heroin abuse  · Ceftriaxone dc'ed, start Ancef and continue Vanco   · Procalcitonin elevated  Trend repeat  · Follow-up on urine culture  · Blood cultures gram + clusters  · Patient afebrile since July 15, 2020      Bacteremia due to Staphylococcus aureus  Assessment & Plan  · Ancef and Vanco per infectious disease    Loculated pleural effusion  Assessment & Plan  · CT on admission:  Partial evaluation of a loculated right basilar pleural effusion with right middle lobe opacity likely reflecting atelectasis with infiltrate not excluded  Consider follow-up CT chest   · Continue treatment will consider IR consult for possible drainage    Acute pyelonephritis  Assessment & Plan  · CT: Somewhat streaky opacification of the renal cortex bilaterally consistent with pyelonephritis, uncomplicated  · Urinalysis fairly unremarkable with no evidence of nitrates, 4-10 WBCs and occasional bacteria  · Urine culture  pending  · Patient tachycardic and tachypneic on admission with significant CVA tenderness  · Continue antibiotics per Infectious Disease  · Urine culture next contaminant not completely clear this is actual pyelonephritis   · patient is clinically improved      Heroin abuse ([facility] [address] )  Assessment & Plan  · Patient's history of heroin abuse - clean x 1 year then used 1 week ago (IV heroin)  · No signs of withdrawal at this time  · Limit opioids  Did receive Dilaudid in the emergency department    Discussed with patient that we will give only minimal opioids  · Started on Percocet last evening with significant improvement in her pain  Continue on Toradol on low-dose Percocet as needed  · Prior endocarditis 2018      VTE Pharmacologic Prophylaxis:   Pharmacologic: Pharmacologic VTE Prophylaxis contraindicated due to Low risk patient ambulatory  Mechanical VTE Prophylaxis in Place: No    Patient Centered Rounds: I have performed bedside rounds with nursing staff today  Education and Discussions with Family / Patient:  Patient's mother is at bedside    Time Spent for Care: 30 minutes  More than 50% of total time spent on counseling and coordination of care as described above  Current Length of Stay: 3 day(s)    Current Patient Status: Inpatient   Certification Statement: The patient will continue to require additional inpatient hospital stay due to Staph bacteremia most likely endocarditis    Discharge Plan:  Patient may very well require a multi week hospital stay    Code Status: Level 1 - Full Code      Subjective:   Patient complaining of continue pleuritic chest pain    Objective:     Vitals:   Temp (24hrs), Av 2 °F (36 8 °C), Min:98 °F (36 7 °C), Max:98 6 °F (37 °C)    Temp:  [98 °F (36 7 °C)-98 6 °F (37 °C)] 98 °F (36 7 °C)  HR:  [75-90] 75  Resp:  [18-20] 18  BP: (110-126)/(58-77) 118/77  SpO2:  [97 %-98 %] 98 %  Body mass index is 26 45 kg/m²  Input and Output Summary (last 24 hours): Intake/Output Summary (Last 24 hours) at 2020 0842  Last data filed at 2020 1245  Gross per 24 hour   Intake 1440 ml   Output    Net 1440 ml       Physical Exam:     Physical Exam   Constitutional: She is oriented to person, place, and time  No distress  HENT:   Head: Normocephalic and atraumatic  Cardiovascular: Normal rate, regular rhythm and normal heart sounds  Exam reveals no gallop and no friction rub  No murmur heard  Pulmonary/Chest: Effort normal and breath sounds normal  No stridor   No respiratory distress  She has no wheezes  She has no rales  Abdominal: Soft  Bowel sounds are normal  She exhibits no distension and no mass  There is no tenderness  There is no rebound and no guarding  Neurological: She is alert and oriented to person, place, and time  Skin: She is not diaphoretic  Psychiatric: She has a normal mood and affect  Additional Data:     Labs:    Results from last 7 days   Lab Units 07/15/20  0928   WBC Thousand/uL 8 49   HEMOGLOBIN g/dL 11 3*   HEMATOCRIT % 32 9*   PLATELETS Thousands/uL 207   BANDS PCT % 6   LYMPHO PCT % 15   MONO PCT % 13*   EOS PCT % 0     Results from last 7 days   Lab Units 07/17/20  0425   SODIUM mmol/L 137   POTASSIUM mmol/L 3 6   CHLORIDE mmol/L 104   CO2 mmol/L 24   BUN mg/dL 6   CREATININE mg/dL 0 74   ANION GAP mmol/L 9   CALCIUM mg/dL 8 2*   GLUCOSE RANDOM mg/dL 100                 Results from last 7 days   Lab Units 07/17/20  0425 07/15/20  2310   PROCALCITONIN ng/ml 1 25* 2 63*           * I Have Reviewed All Lab Data Listed Above  * Additional Pertinent Lab Tests Reviewed:  All Labs Within Last 24 Hours Reviewed    Imaging:    Imaging Reports Reviewed Today Include:   Imaging Personally Reviewed by Myself Includes:      Recent Cultures (last 7 days):     Results from last 7 days   Lab Units 07/15/20  2316 07/15/20  2314 07/15/20  0932   BLOOD CULTURE  Staphylococcus aureus*  --   --    GRAM STAIN RESULT  Gram positive cocci in clusters* Gram positive cocci in clusters*  --    URINE CULTURE   --   --  40,000-49,000 cfu/ml        Last 24 Hours Medication List:     Current Facility-Administered Medications:  acetaminophen 650 mg Oral Q6H PRN Kesha Chen PA-C    cefazolin 2,000 mg Intravenous Q8H Palma Aldsaurabh, DO Last Rate: 2,000 mg (07/18/20 0108)   ketorolac 15 mg Intravenous Q6H PRN Layne Kenyon, CRNP    Or        ketorolac 30 mg Intravenous Q6H PRN Layne Kenyon, CRNP    lidocaine 1 patch Topical Daily Kesha Chen PA-C    melatonin 6 mg Oral HS Stephon Blancas PA-C    ondansetron 4 mg Intravenous Q6H PRN Konstantin Lee PA-C    oxyCODONE-acetaminophen 1 tablet Oral Q4H PRN Ilene Hernandez PA-C    sodium chloride 75 mL/hr Intravenous Continuous Kesha Chen PA-C Last Rate: 75 mL/hr (07/18/20 0434)   vancomycin 17 5 mg/kg Intravenous Q12H Janie Saavedra PA-C Last Rate: 1,250 mg (07/18/20 0403)        Today, Patient Was Seen By: Joselin Becker MD    ** Please Note: Dictation voice to text software may have been used in the creation of this document   **

## 2020-07-19 ENCOUNTER — APPOINTMENT (INPATIENT)
Dept: NON INVASIVE DIAGNOSTICS | Facility: HOSPITAL | Age: 30
DRG: 720 | End: 2020-07-19
Payer: COMMERCIAL

## 2020-07-19 LAB
BACTERIA BLD CULT: ABNORMAL
GRAM STN SPEC: ABNORMAL
PROCALCITONIN SERPL-MCNC: 0.26 NG/ML

## 2020-07-19 PROCEDURE — 93306 TTE W/DOPPLER COMPLETE: CPT

## 2020-07-19 PROCEDURE — 93306 TTE W/DOPPLER COMPLETE: CPT | Performed by: INTERNAL MEDICINE

## 2020-07-19 PROCEDURE — 84145 PROCALCITONIN (PCT): CPT | Performed by: NURSE PRACTITIONER

## 2020-07-19 PROCEDURE — 99232 SBSQ HOSP IP/OBS MODERATE 35: CPT | Performed by: INTERNAL MEDICINE

## 2020-07-19 PROCEDURE — 99233 SBSQ HOSP IP/OBS HIGH 50: CPT | Performed by: INTERNAL MEDICINE

## 2020-07-19 RX ORDER — KETOROLAC TROMETHAMINE 30 MG/ML
30 INJECTION, SOLUTION INTRAMUSCULAR; INTRAVENOUS EVERY 6 HOURS PRN
Status: DISPENSED | OUTPATIENT
Start: 2020-07-19 | End: 2020-07-21

## 2020-07-19 RX ORDER — KETOROLAC TROMETHAMINE 30 MG/ML
15 INJECTION, SOLUTION INTRAMUSCULAR; INTRAVENOUS EVERY 6 HOURS PRN
Status: ACTIVE | OUTPATIENT
Start: 2020-07-19 | End: 2020-07-21

## 2020-07-19 RX ADMIN — VANCOMYCIN HYDROCHLORIDE 1250 MG: 1 INJECTION, POWDER, LYOPHILIZED, FOR SOLUTION INTRAVENOUS at 07:40

## 2020-07-19 RX ADMIN — VANCOMYCIN HYDROCHLORIDE 1250 MG: 1 INJECTION, POWDER, LYOPHILIZED, FOR SOLUTION INTRAVENOUS at 01:34

## 2020-07-19 RX ADMIN — OXYCODONE AND ACETAMINOPHEN 1 TABLET: 5; 325 TABLET ORAL at 21:21

## 2020-07-19 RX ADMIN — SODIUM CHLORIDE 75 ML/HR: 0.9 INJECTION, SOLUTION INTRAVENOUS at 09:42

## 2020-07-19 RX ADMIN — KETOROLAC TROMETHAMINE 30 MG: 30 INJECTION, SOLUTION INTRAMUSCULAR at 07:37

## 2020-07-19 RX ADMIN — OXYCODONE AND ACETAMINOPHEN 1 TABLET: 5; 325 TABLET ORAL at 16:14

## 2020-07-19 RX ADMIN — CEFAZOLIN SODIUM 2000 MG: 2 SOLUTION INTRAVENOUS at 16:14

## 2020-07-19 RX ADMIN — OXYCODONE AND ACETAMINOPHEN 1 TABLET: 5; 325 TABLET ORAL at 09:51

## 2020-07-19 RX ADMIN — CEFAZOLIN SODIUM 2000 MG: 2 SOLUTION INTRAVENOUS at 09:43

## 2020-07-19 RX ADMIN — MELATONIN 6 MG: at 21:21

## 2020-07-19 RX ADMIN — KETOROLAC TROMETHAMINE 30 MG: 30 INJECTION, SOLUTION INTRAMUSCULAR at 20:19

## 2020-07-19 RX ADMIN — KETOROLAC TROMETHAMINE 30 MG: 30 INJECTION, SOLUTION INTRAMUSCULAR at 00:13

## 2020-07-19 RX ADMIN — CEFAZOLIN SODIUM 2000 MG: 2 SOLUTION INTRAVENOUS at 00:13

## 2020-07-19 RX ADMIN — KETOROLAC TROMETHAMINE 30 MG: 30 INJECTION, SOLUTION INTRAMUSCULAR at 13:36

## 2020-07-19 RX ADMIN — OXYCODONE AND ACETAMINOPHEN 1 TABLET: 5; 325 TABLET ORAL at 04:52

## 2020-07-19 NOTE — PROGRESS NOTES
Progress Note - Sherine Mas 1990, 34 y o  female MRN: [id number]    Unit/Bed#: S -83 Encounter: [id number]    Primary Care Provider: Jess Mccloud PA-C   Date and time admitted to hospital: 7/15/2020  8:41 AM        * Sepsis ([facility] )  Assessment & Plan  · Tachycardia and tachypnea noted on admission  · Sepsis now resolved  · Ceftriaxone dc'ed, start Ancef and continue Vanco   · Procalcitonin elevated  Trending down as of July 18th will repeat tomorrow  · Urine culture mixed contaminant  · Blood cultures July 15th positive Staph aureus   · repeat blood cultures drawn July 18th so far negative  · Patient afebrile since July 15, 2020      Bacteremia due to Staphylococcus aureus  Assessment & Plan  · Ancef and Vanco per infectious disease  · Pharmacy monitoring and managing ago does    Loculated pleural effusion  Assessment & Plan  · CT on admission:  Partial evaluation of a loculated right basilar pleural effusion with right middle lobe opacity likely reflecting atelectasis with infiltrate not excluded  Consider follow-up CT chest   · Continue treatment  · May need IR drainage of pleural effusion if patient becomes symptomatic    Acute pyelonephritis  Assessment & Plan  · CT: Somewhat streaky opacification of the renal cortex bilaterally consistent with pyelonephritis, uncomplicated  · Urinalysis fairly unremarkable with no evidence of nitrates, 4-10 WBCs and occasional bacteria  · Urine culture  pending  · Patient tachycardic and tachypneic on admission with significant CVA tenderness  · Continue antibiotics per Infectious Disease  · Urine culture next contaminant not completely clear this is actual pyelonephritis   · patient is clinically improved      Heroin abuse ([facility] )  Assessment & Plan  · Patient's history of heroin abuse - clean x 1 year then used 1 week ago (IV heroin)  · No signs of withdrawal at this time  · Limit opioids  Did receive Dilaudid in the emergency department  Discussed with patient that we will give only minimal opioids  · Started on Percocet last evening with significant improvement in her pain  Continue on Toradol on low-dose Percocet as needed  · Patient aware of no escalation in pain management  · Prior endocarditis 2018      VTE Pharmacologic Prophylaxis:   Pharmacologic: Patient low risk ambulatory  Mechanical VTE Prophylaxis in Place: No    Patient Centered Rounds: I have performed bedside rounds with nursing staff today  Education and Discussions with Family / Patient:  Patient's mother was updated at bedside yesterday    Time Spent for Care: 30 minutes  More than 50% of total time spent on counseling and coordination of care as described above  Current Length of Stay: 4 day(s)    Current Patient Status: Inpatient   Certification Statement: The patient will continue to require additional inpatient hospital stay due to IV antibiotics for presumed endocarditis    Discharge Plan:  Patient may need a prolonged hospital stay    Code Status: Level 1 - Full Code      Subjective:   No acute distress awake and alert    Objective:     Vitals:   Temp (24hrs), Av 8 °F (36 6 °C), Min:97 6 °F (36 4 °C), Max:98 °F (36 7 °C)    Temp:  [97 6 °F (36 4 °C)-98 °F (36 7 °C)] 98 °F (36 7 °C)  HR:  [64-68] 64  Resp:  [16-18] 16  BP: (111-113)/(66-72) 111/66  SpO2:  [97 %] 97 %  Body mass index is 26 45 kg/m²  Input and Output Summary (last 24 hours): Intake/Output Summary (Last 24 hours) at 2020 0740  Last data filed at 2020 1025  Gross per 24 hour   Intake    Output 475 ml   Net -475 ml       Physical Exam:     Physical Exam   Constitutional: She is oriented to person, place, and time  No distress  Cardiovascular: Normal rate, regular rhythm and normal heart sounds  Exam reveals no gallop and no friction rub  No murmur heard  Pulmonary/Chest: Effort normal and breath sounds normal  No stridor  No respiratory distress  She has no wheezes   She has no rales  Abdominal: Soft  Bowel sounds are normal  She exhibits no distension and no mass  There is no tenderness  There is no rebound and no guarding  Neurological: She is alert and oriented to person, place, and time  Skin: Skin is warm and dry  She is not diaphoretic  Additional Data:     Labs:    Results from last 7 days   Lab Units 07/18/20  1059   WBC Thousand/uL 4 43   HEMOGLOBIN g/dL 9 6*   HEMATOCRIT % 28 6*   PLATELETS Thousands/uL 242   BANDS PCT % 1   LYMPHO PCT % 22   MONO PCT % 7   EOS PCT % 1     Results from last 7 days   Lab Units 07/18/20  0903   SODIUM mmol/L 137   POTASSIUM mmol/L 3 7   CHLORIDE mmol/L 106   CO2 mmol/L 20*   BUN mg/dL 6   CREATININE mg/dL 0 58*   ANION GAP mmol/L 11   CALCIUM mg/dL 7 6*   GLUCOSE RANDOM mg/dL 98                 Results from last 7 days   Lab Units 07/18/20  0903 07/17/20  0425 07/15/20  2310   PROCALCITONIN ng/ml 0 57* 1 25* 2 63*           * I Have Reviewed All Lab Data Listed Above  * Additional Pertinent Lab Tests Reviewed: All Labs Within Last 24 Hours Reviewed    Imaging:    Imaging Reports Reviewed Today Include:   Imaging Personally Reviewed by Myself Includes:      Recent Cultures (last 7 days):     Results from last 7 days   Lab Units 07/18/20  0913 07/18/20  0902 07/15/20  2316 07/15/20  2314 07/15/20  0932   BLOOD CULTURE  Received in Microbiology Lab  Culture in Progress  Received in Microbiology Lab  Culture in Progress   Staphylococcus aureus* Staphylococcus aureus*  --    GRAM STAIN RESULT   --   --  Gram positive cocci in clusters* Gram positive cocci in clusters*  --    URINE CULTURE   --   --   --   --  40,000-49,000 cfu/ml        Last 24 Hours Medication List:     Current Facility-Administered Medications:  acetaminophen 650 mg Oral Q6H PRN Kesha Chen PA-C    cefazolin 2,000 mg Intravenous Q8H Palma Boland DO Last Rate: 2,000 mg (07/19/20 0013)   ketorolac 15 mg Intravenous Q6H PRN BOO Amor    Or ketorolac 30 mg Intravenous Q6H PRN BOO Pina    lidocaine 1 patch Topical Daily Kesha Chen PA-C    melatonin 6 mg Oral HS Stephon Blancas PA-C    ondansetron 4 mg Intravenous Q6H PRN Richard Luz PA-C    oxyCODONE-acetaminophen 1 tablet Oral Q4H PRN Ilene Hernandez PA-C    sodium chloride 75 mL/hr Intravenous Continuous Kesha Chen PA-C Last Rate: 75 mL/hr (07/18/20 1714)   vancomycin 17 5 mg/kg Intravenous Q8H Maciej Delgaod PA-C Last Rate: 1,250 mg (07/19/20 0134)        Today, Patient Was Seen By: Paulette Saint, MD    ** Please Note: Dictation voice to text software may have been used in the creation of this document   **

## 2020-07-19 NOTE — PROGRESS NOTES
Pt refusing to be stuck this morning for blood work  She says she is a difficult stick and would like to be done by ultrasound  But she wants to be stuck later on this morning since she hasn't gotten any sleep  Will make RN in the morning aware

## 2020-07-19 NOTE — ASSESSMENT & PLAN NOTE
· Tachycardia and tachypnea noted on admission  · Sepsis now resolved  · Ceftriaxone dc'ed, start Ancef and continue Vanco   · Procalcitonin elevated    Trending down as of July 18th will repeat tomorrow  · Urine culture mixed contaminant  · Blood cultures July 15th positive Staph aureus   · repeat blood cultures drawn July 18th so far negative  · Patient afebrile since July 15, 2020

## 2020-07-19 NOTE — ASSESSMENT & PLAN NOTE
· CT on admission:  Partial evaluation of a loculated right basilar pleural effusion with right middle lobe opacity likely reflecting atelectasis with infiltrate not excluded    Consider follow-up CT chest   · Continue treatment  · May need IR drainage of pleural effusion if patient becomes symptomatic

## 2020-07-19 NOTE — ASSESSMENT & PLAN NOTE
· Patient's history of heroin abuse - clean x 1 year then used 1 week ago (IV heroin)  · No signs of withdrawal at this time  · Limit opioids  Did receive Dilaudid in the emergency department  Discussed with patient that we will give only minimal opioids  · Started on Percocet last evening with significant improvement in her pain    Continue on Toradol on low-dose Percocet as needed  · Patient aware of no escalation in pain management  · Prior endocarditis 2018

## 2020-07-19 NOTE — PROGRESS NOTES
Progress Note - Infectious Disease   Alessia Elena 34 y o  female MRN: [id number]  Unit/Bed#: S -01 Encounter: [id number]      Impression/Recommendations:  1   Sepsis   Developing soon on admission with tachycardia and fever   In setting of thoracic back pain and shortness of breath with subjective fever and chills pre-admission with CT scan findings concerning for septic emboli in patient with history of endocarditis and recent IV drug use 7/8/20  Blood cultures now + MSSA    Rec:  · Antibiotic plan as in below  · Monitor temperature and hemodynamics  · Monitor CBC  · Additional supportive care per primary care team     2   MSSA bacteremia   Two of 2 blood cultures positive for gram-positive cocci in clusters and preliminarily identifying as Staph aureus   TTE pending   Given history of MSSA bacteremia and possible endocarditis in 2018 and current + blood cultures, at risk for endocarditis  · Continue high-dose IV cefazolin  · No further need for vancomycin  I will discontinue  · Follow-up  repeat blood cultures  · Follow up TTE  Patient may need MELISSA      3    Probable septic pulmonary emboli with loculated right pleural effusion  Given significant right-sided pleuritic chest pain, patient should get thoracentesis to assess for empyema  Rec:  · Antibiotic plan as in above   · Monitor temperature and hemodynamics  · Monitor right-sided chest pain  · Recommend IR evaluation for thoracentesis      4  Right hand cellulitis   Mild tender induration and pink erythema at track site right hand   No fluctuance to suggest abscess  Likely portal of entry to #2  Patient is clinically improved  Cellulitis resolved    Rec:  · Continue antibiotics as above  · Hand elevation  · Serial exam     5   Abnormal CT A/P  CT abdomen pelvis:  Streaky renal cortical enhancement bilaterally   In setting of probable pulmonary septic emboli   UA bland, urine cx with mixed contaminants and without  clinical findings of UTI/pyelonephritis  Rec:  · Continue antibiotic as above  · Serial exam   · Monitor urinary symptoms     6   MSSA bacteremia/possible Endocarditis    Patient had 1 of 2 blood cultures positive for MSSA on 2018 MELISSA showed questionable "small linear echodensity  "  Repeat MELISSA done on  shows "no definite evidence of endocarditis," and "previously visualized echodensity on anterior leaflet not identified  "  Patient completed 15 days of IV Cefazolin per ID at [facility]   Patient was to have surveillance blood cultures 1-2 weeks after antibiotics complete, but these do not appear to have been obtained at [facility]  Rec:  · Antibiotics as above  · Close clinical follow-up  · Follow-up TTE     7   IVDU:  Recent IVDU 2020 and Urine toxicology screen was positive for THC, cocaine, and opiates   At risk for recurrent endocarditis with septic emboli   Rec:  · Antibiotics as above  · Serial exam   · Pain management per primary care team      8   Hepatitis C:  Untreated as of yet   Patient did not follow-up with [facility] as advised  HIV screen pending     Discussed with patient in detail regarding the above plan      Antibiotics:  Vancomycin/cefazolin  Antibiotic # 4      Subjective:  Patient complains of unchanged right-sided pleuritic chest pain  Otherwise, she is comfortable  Right hand pain minimal now  Temperature stays down  No chills  She is tolerating antibiotic well  No nausea, vomiting or diarrhea  No dizziness or hearing loss  Objective:  Vitals:  Temp:  [97 5 °F (36 4 °C)-98 °F (36 7 °C)] 97 5 °F (36 4 °C)  HR:  [64-71] 71  Resp:  [16-18] 18  BP: (111-128)/(66-75) 128/75  SpO2:  [97 %-99 %] 99 %  Temp (24hrs), Av 7 °F (36 5 °C), Min:97 5 °F (36 4 °C), Max:98 °F (36 7 °C)  Current: Temperature: 97 5 °F (36 4 °C)    Physical Exam:     General: Awake, alert, cooperative, no distress  Neck:  Supple  No mass  No lymphadenopathy     Lungs: Decreased breath sounds on right, right basilar rales, no wheezing, respirations unlabored  Heart:  Regular rate and rhythm, S1 and S2 normal, no murmur  Abdomen: Soft, nondistended, non-tender, bowel sounds active all four quadrants, no masses, no organomegaly  Extremities: Much improved hand edema  No erythema/warmth  No ulcer  Minimal tenderness to palpation  Skin:  No rash  Neuro: Moves all extremities  Invasive Devices     Peripheral Intravenous Line            Peripheral IV 07/17/20 Left;Ventral (anterior) Forearm 2 days                Labs studies:   I have personally reviewed pertinent labs  Results from last 7 days   Lab Units 07/18/20  0903 07/17/20  0425 07/15/20  2310   POTASSIUM mmol/L 3 7 3 6 3 3*   CHLORIDE mmol/L 106 104 94*   CO2 mmol/L 20* 24 24   BUN mg/dL 6 6 12   CREATININE mg/dL 0 58* 0 74 0 90   EGFR ml/min/1 73sq m 127 111 88   CALCIUM mg/dL 7 6* 8 2* 7 6*     Results from last 7 days   Lab Units 07/18/20  1059 07/15/20  0928   WBC Thousand/uL 4 43 8 49   HEMOGLOBIN g/dL 9 6* 11 3*   PLATELETS Thousands/uL 242 207     Results from last 7 days   Lab Units 07/18/20  0913 07/18/20  0902 07/15/20  2316 07/15/20  2314 07/15/20  0932   BLOOD CULTURE  Received in Microbiology Lab  Culture in Progress  Received in Microbiology Lab  Culture in Progress  Staphylococcus aureus* Staphylococcus aureus*  --    GRAM STAIN RESULT   --   --  Gram positive cocci in clusters* Gram positive cocci in clusters*  --    URINE CULTURE   --   --   --   --  40,000-49,000 cfu/ml        Imaging Studies:   I have personally reviewed pertinent imaging study reports and images in PACS  EKG, Pathology, and Other Studies:   I have personally reviewed pertinent reports

## 2020-07-20 PROBLEM — L02.612 ABSCESS OF LEFT FOOT: Status: ACTIVE | Noted: 2020-07-20

## 2020-07-20 PROBLEM — A41.9 SEPSIS (HCC): Status: RESOLVED | Noted: 2020-07-15 | Resolved: 2020-07-20

## 2020-07-20 PROBLEM — G47.00 INSOMNIA: Status: ACTIVE | Noted: 2020-07-20

## 2020-07-20 LAB
ALBUMIN SERPL BCP-MCNC: 2 G/DL (ref 3.5–5)
ALP SERPL-CCNC: 97 U/L (ref 46–116)
ALT SERPL W P-5'-P-CCNC: 11 U/L (ref 12–78)
ANION GAP SERPL CALCULATED.3IONS-SCNC: 7 MMOL/L (ref 4–13)
AST SERPL W P-5'-P-CCNC: 10 U/L (ref 5–45)
BACTERIA BLD CULT: ABNORMAL
BASOPHILS # BLD MANUAL: 0 THOUSAND/UL (ref 0–0.1)
BASOPHILS NFR MAR MANUAL: 0 % (ref 0–1)
BILIRUB SERPL-MCNC: 0.14 MG/DL (ref 0.2–1)
BUN SERPL-MCNC: 5 MG/DL (ref 5–25)
CALCIUM SERPL-MCNC: 7.8 MG/DL (ref 8.3–10.1)
CHLORIDE SERPL-SCNC: 106 MMOL/L (ref 100–108)
CO2 SERPL-SCNC: 25 MMOL/L (ref 21–32)
CREAT SERPL-MCNC: 0.56 MG/DL (ref 0.6–1.3)
EOSINOPHIL # BLD MANUAL: 0 THOUSAND/UL (ref 0–0.4)
EOSINOPHIL NFR BLD MANUAL: 0 % (ref 0–6)
ERYTHROCYTE [DISTWIDTH] IN BLOOD BY AUTOMATED COUNT: 13.2 % (ref 11.6–15.1)
GFR SERPL CREATININE-BSD FRML MDRD: 128 ML/MIN/1.73SQ M
GLUCOSE SERPL-MCNC: 97 MG/DL (ref 65–140)
GRAM STN SPEC: ABNORMAL
HCT VFR BLD AUTO: 29.1 % (ref 34.8–46.1)
HGB BLD-MCNC: 9.5 G/DL (ref 11.5–15.4)
LYMPHOCYTES # BLD AUTO: 2.31 THOUSAND/UL (ref 0.6–4.47)
LYMPHOCYTES # BLD AUTO: 35 % (ref 14–44)
MCH RBC QN AUTO: 28.5 PG (ref 26.8–34.3)
MCHC RBC AUTO-ENTMCNC: 32.6 G/DL (ref 31.4–37.4)
MCV RBC AUTO: 87 FL (ref 82–98)
METAMYELOCYTES NFR BLD MANUAL: 3 % (ref 0–1)
MONOCYTES # BLD AUTO: 0.26 THOUSAND/UL (ref 0–1.22)
MONOCYTES NFR BLD: 4 % (ref 4–12)
NEUTROPHILS # BLD MANUAL: 3.5 THOUSAND/UL (ref 1.85–7.62)
NEUTS SEG NFR BLD AUTO: 53 % (ref 43–75)
NRBC BLD AUTO-RTO: 0 /100 WBCS
PLATELET # BLD AUTO: 306 THOUSANDS/UL (ref 149–390)
PLATELET BLD QL SMEAR: ADEQUATE
PMV BLD AUTO: 9.3 FL (ref 8.9–12.7)
POTASSIUM SERPL-SCNC: 3.7 MMOL/L (ref 3.5–5.3)
PROT SERPL-MCNC: 6.4 G/DL (ref 6.4–8.2)
RBC # BLD AUTO: 3.33 MILLION/UL (ref 3.81–5.12)
SODIUM SERPL-SCNC: 138 MMOL/L (ref 136–145)
TOTAL CELLS COUNTED SPEC: 100
VARIANT LYMPHS # BLD AUTO: 5 %
WBC # BLD AUTO: 6.6 THOUSAND/UL (ref 4.31–10.16)

## 2020-07-20 PROCEDURE — 80053 COMPREHEN METABOLIC PANEL: CPT | Performed by: INTERNAL MEDICINE

## 2020-07-20 PROCEDURE — 85027 COMPLETE CBC AUTOMATED: CPT | Performed by: INTERNAL MEDICINE

## 2020-07-20 PROCEDURE — 99232 SBSQ HOSP IP/OBS MODERATE 35: CPT | Performed by: INTERNAL MEDICINE

## 2020-07-20 PROCEDURE — NC001 PR NO CHARGE: Performed by: INTERNAL MEDICINE

## 2020-07-20 PROCEDURE — 99448 NTRPROF PH1/NTRNET/EHR 21-30: CPT | Performed by: RADIOLOGY

## 2020-07-20 PROCEDURE — 85007 BL SMEAR W/DIFF WBC COUNT: CPT | Performed by: INTERNAL MEDICINE

## 2020-07-20 RX ORDER — PAROXETINE HYDROCHLORIDE 12.5 MG/1
40 TABLET, FILM COATED, EXTENDED RELEASE ORAL DAILY
COMMUNITY
End: 2020-07-22 | Stop reason: HOSPADM

## 2020-07-20 RX ORDER — METHOCARBAMOL 500 MG/1
500 TABLET, FILM COATED ORAL EVERY 6 HOURS PRN
Status: DISCONTINUED | OUTPATIENT
Start: 2020-07-20 | End: 2020-07-22 | Stop reason: HOSPADM

## 2020-07-20 RX ORDER — PAROXETINE HYDROCHLORIDE 12.5 MG/1
12.5 TABLET, FILM COATED, EXTENDED RELEASE ORAL DAILY
Status: DISCONTINUED | OUTPATIENT
Start: 2020-07-20 | End: 2020-07-20

## 2020-07-20 RX ORDER — MIRTAZAPINE 15 MG/1
30 TABLET, FILM COATED ORAL
Status: DISCONTINUED | OUTPATIENT
Start: 2020-07-20 | End: 2020-07-22 | Stop reason: HOSPADM

## 2020-07-20 RX ORDER — OXYCODONE HYDROCHLORIDE AND ACETAMINOPHEN 5; 325 MG/1; MG/1
2 TABLET ORAL EVERY 4 HOURS PRN
Status: DISCONTINUED | OUTPATIENT
Start: 2020-07-20 | End: 2020-07-22 | Stop reason: HOSPADM

## 2020-07-20 RX ORDER — OLANZAPINE 10 MG/1
10 TABLET ORAL
Status: DISCONTINUED | OUTPATIENT
Start: 2020-07-20 | End: 2020-07-20

## 2020-07-20 RX ORDER — OLANZAPINE 10 MG/1
10 TABLET ORAL DAILY
COMMUNITY
Start: 2019-05-24 | End: 2020-07-22 | Stop reason: HOSPADM

## 2020-07-20 RX ADMIN — KETOROLAC TROMETHAMINE 30 MG: 30 INJECTION, SOLUTION INTRAMUSCULAR at 12:25

## 2020-07-20 RX ADMIN — KETOROLAC TROMETHAMINE 30 MG: 30 INJECTION, SOLUTION INTRAMUSCULAR at 04:01

## 2020-07-20 RX ADMIN — MELATONIN 6 MG: at 21:34

## 2020-07-20 RX ADMIN — CEFAZOLIN SODIUM 2000 MG: 2 SOLUTION INTRAVENOUS at 08:16

## 2020-07-20 RX ADMIN — SODIUM CHLORIDE 75 ML/HR: 0.9 INJECTION, SOLUTION INTRAVENOUS at 20:11

## 2020-07-20 RX ADMIN — CEFAZOLIN SODIUM 2000 MG: 2 SOLUTION INTRAVENOUS at 16:11

## 2020-07-20 RX ADMIN — MIRTAZAPINE 30 MG: 15 TABLET, FILM COATED ORAL at 21:34

## 2020-07-20 RX ADMIN — SODIUM CHLORIDE 75 ML/HR: 0.9 INJECTION, SOLUTION INTRAVENOUS at 04:00

## 2020-07-20 RX ADMIN — CEFAZOLIN SODIUM 2000 MG: 2 SOLUTION INTRAVENOUS at 01:40

## 2020-07-20 RX ADMIN — KETOROLAC TROMETHAMINE 30 MG: 30 INJECTION, SOLUTION INTRAMUSCULAR at 20:14

## 2020-07-20 RX ADMIN — OXYCODONE HYDROCHLORIDE AND ACETAMINOPHEN 2 TABLET: 5; 325 TABLET ORAL at 22:12

## 2020-07-20 RX ADMIN — OXYCODONE HYDROCHLORIDE AND ACETAMINOPHEN 2 TABLET: 5; 325 TABLET ORAL at 16:06

## 2020-07-20 RX ADMIN — OXYCODONE AND ACETAMINOPHEN 1 TABLET: 5; 325 TABLET ORAL at 06:45

## 2020-07-20 NOTE — PLAN OF CARE
Problem: PAIN - ADULT  Goal: Verbalizes/displays adequate comfort level or baseline comfort level  Description  Interventions:  - Encourage patient to monitor pain and request assistance  - Assess pain using appropriate pain scale  - Administer analgesics based on type and severity of pain and evaluate response  - Implement non-pharmacological measures as appropriate and evaluate response  - Consider cultural and social influences on pain and pain management  - Notify physician/advanced practitioner if interventions unsuccessful or patient reports new pain  Outcome: Progressing     Problem: INFECTION - ADULT  Goal: Absence or prevention of progression during hospitalization  Description  INTERVENTIONS:  - Assess and monitor for signs and symptoms of infection  - Monitor lab/diagnostic results  - Monitor all insertion sites, i e  indwelling lines, tubes, and drains  - Monitor endotracheal if appropriate and nasal secretions for changes in amount and color  - Opp appropriate cooling/warming therapies per order  - Administer medications as ordered  - Instruct and encourage patient and family to use good hand hygiene technique  - Identify and instruct in appropriate isolation precautions for identified infection/condition  Outcome: Progressing  Goal: Absence of fever/infection during neutropenic period  Description  INTERVENTIONS:  - Monitor WBC    Outcome: Progressing     Problem: SAFETY ADULT  Goal: Patient will remain free of falls  Description  INTERVENTIONS:  - Assess patient frequently for physical needs  -  Identify cognitive and physical deficits and behaviors that affect risk of falls    -  Opp fall precautions as indicated by assessment   - Educate patient/family on patient safety including physical limitations  - Instruct patient to call for assistance with activity based on assessment  - Modify environment to reduce risk of injury  - Consider OT/PT consult to assist with strengthening/mobility  Outcome: Progressing  Goal: Maintain or return to baseline ADL function  Description  INTERVENTIONS:  -  Assess patient's ability to carry out ADLs; assess patient's baseline for ADL function and identify physical deficits which impact ability to perform ADLs (bathing, care of mouth/teeth, toileting, grooming, dressing, etc )  - Assess/evaluate cause of self-care deficits   - Assess range of motion  - Assess patient's mobility; develop plan if impaired  - Assess patient's need for assistive devices and provide as appropriate  - Encourage maximum independence but intervene and supervise when necessary  - Involve family in performance of ADLs  - Assess for home care needs following discharge   - Consider OT consult to assist with ADL evaluation and planning for discharge  - Provide patient education as appropriate  Outcome: Progressing  Goal: Maintain or return mobility status to optimal level  Description  INTERVENTIONS:  - Assess patient's baseline mobility status (ambulation, transfers, stairs, etc )    - Identify cognitive and physical deficits and behaviors that affect mobility  - Identify mobility aids required to assist with transfers and/or ambulation (gait belt, sit-to-stand, lift, walker, cane, etc )  - [place] fall precautions as indicated by assessment  - Record patient progress and toleration of activity level on Mobility SBAR; progress patient to next Phase/Stage  - Instruct patient to call for assistance with activity based on assessment  - Consider rehabilitation consult to assist with strengthening/weightbearing, etc   Outcome: Progressing     Problem: DISCHARGE PLANNING  Goal: Discharge to home or other facility with appropriate resources  Description  INTERVENTIONS:  - Identify barriers to discharge w/patient and caregiver  - Arrange for needed discharge resources and transportation as appropriate  - Identify discharge learning needs (meds, wound care, etc )  - Arrange for interpretive services to assist at discharge as needed  - Refer to Case Management Department for coordinating discharge planning if the patient needs post-hospital services based on physician/advanced practitioner order or complex needs related to functional status, cognitive ability, or social support system  Outcome: Progressing     Problem: Knowledge Deficit  Goal: Patient/family/caregiver demonstrates understanding of disease process, treatment plan, medications, and discharge instructions  Description  Complete learning assessment and assess knowledge base    Interventions:  - Provide teaching at level of understanding  - Provide teaching via preferred learning methods  Outcome: Progressing

## 2020-07-20 NOTE — QUICK NOTE
Cardiology was consulted for MELISSA  34year old active IVDA admitted with sepsis/ MSSA  CT chest demonstrated septic pulmonary emboli  Loculated right pleural effusion  2D echocardiogram- 1cm mass on atrial aspect of free wall leaflet of TV  Highly eccentric TR and may represent leaflet perforation  Moderate TR  History of endocarditis treated at [facility] 11/2018  F/U MELISSA - no definite endocarditis  Pt agreeable to proceed with MELISSA  NPO after midnight tonight

## 2020-07-20 NOTE — ASSESSMENT & PLAN NOTE
· Pt  Is IV drug abuser, could be source of her bacteremia  · Blood Cx on 7/15- Staph aureus, Blood Cx- no growth at 24 hrs, another set of blood Cx drawn today  ·  Will Monitor Blood CX  · Ancef per infectious disease  TTE- There is an approximately 1cm atrial aspect mass on the free wall leaflet of tricuspid valve  There appears to be highly eccentric tricuspid regurgitation and may represent leaflet perforation  There was moderate regurgitation   ID recommends MELISSA to R/O Endocarditis   · Consulted cardiology for MELISSA   NPO since midnight  · ID also recommends- MRI T spine, will follow-up imaging

## 2020-07-20 NOTE — CONSULTS
INTERPROFESSIONAL (PHONE) Hermilooswaldo Matteo Juanitoashish Yordan Stevens 34 y o  female MRN: [id number]  Unit/Bed#: S -48 Encounter: [id number]    IR has been consulted to evaluate the patient, determine the appropriate procedure, and whether or not a procedure can and should be performed regarding the care of Pablo Mendez  We were consulted by Wes Moreno MD concerning loculated right pleural effusion, and to possibly perform a thoracentesis if medically appropriate for the patient  Consults  07/20/20      Assessment/Recommendation:   35 yo female presented with sepsis, MSSA bacteremia and CT showed trace loculated right pleural effusion  Given the small size of the effusion, I do not recommend thoracentesis at this time after careful weighing of risk vs benefit  Total time spent in review of data, discussion with requesting provider and rendering advice was 30 mins  Patient or appropriate family member was verbally informed by Wes Moreno MD of this consultative service on their behalf to provide more timely access to specialty care in lieu of an in person consultation  They were informed it is a billable service unless the it was determined an in person follow up was medically necessary by me within the next 14 days at which time only the in person consult would be billed  Verbal consent was obtained  Thank you for allowing Interventional Radiology to participate in the care of Pablo Mendez  Please don't hesitate to call or TigerText us with any questions       Remy Badillo MD

## 2020-07-20 NOTE — ASSESSMENT & PLAN NOTE
Patient states that she had been taking Remeron 30 mg every day  She has been unable to sleep without medication    Will start Remeron 30 mg bedtime

## 2020-07-20 NOTE — ASSESSMENT & PLAN NOTE
· CT on admission:  Partial evaluation of a loculated right basilar pleural effusion with right middle lobe opacity likely reflecting atelectasis with infiltrate not excluded  · Source Could be likely from her Septic embolus   · Pt  Has tenderness and is still not able to take deep inspiration,also complains of back pain   Will consider a trial dose of Robaxan 800 mg, likely that she has some spasmic component  · Consulted IR for further evaluation of drainage of loculated effusion

## 2020-07-20 NOTE — ASSESSMENT & PLAN NOTE
· CT: Somewhat streaky opacification of the renal cortex bilaterally consistent with pyelonephritis, uncomplicated  · Urinalysis fairly unremarkable with no evidence of nitrates, 4-10 WBCs and occasional bacteria    · Urine culture- 40,000- 49,000 cfu/ml  · Patient tachycardic and tachypneic on admission with significant CVA tenderness  · Continue antibiotics per Infectious Disease  · Urine culture mixed contaminant not completely clear this is actual pyelonephritis   · patient  has clinically improved

## 2020-07-20 NOTE — PROGRESS NOTES
Progress Note - Infectious Disease   Gian Weems 34 y o  female MRN: [id number]  Unit/Bed#: S -01 Encounter: [id number]      Impression/Plan:  1   Sepsis   Developing soon on admission with tachycardia and fever   In setting of thoracic back pain and shortness of breath with subjective fever and chills pre-admission with CT scan findings concerning for septic emboli in patient with history of endocarditis and recent IV drug use 7/8/20  Blood cultures + MSSA   Hemodynamically improving  Rec:  · Antibiotic plan as in below  · Monitor temperature and hemodynamics  · Monitor CBC  · Additional supportive care per primary care team     2   MSSA bacteremia   Two of 2 blood cultures positive for gram-positive cocci in clusters and preliminarily identifying as Staph aureus  TTE is abnormal and concerning for endocarditis  Previous episode of endocarditis in 2018  Rec:  · Continue high-dose IV cefazolin  · Follow-up pending blood cultures  · Repeat blood cultures ordered for tomorrow  · Recommend formal transesophageal echo  · Likely plan is for 6 weeks of IV antibiotics  · Patient is not a candidate for home infusion as below      3   Abnormal TTE and Probable septic pulmonary emboli with loculated right pleural effusion  Reported right-sided pleuritic chest pain  Picture is concerning for endocarditis  Patient also having midthoracic back pain  Rec:  · Antibiotic plan as in above   · Monitor temperature and hemodynamics  · Monitor right-sided chest pain  · Recommend IR evaluation for thoracentesis  · Recommend transesophageal echo as above  · Recommend formal MRI of the T-spine with contrast to rule out VOM     4  Left ankle abscess  Large tender fluctuant area noted on the left ankle which reportedly has progressed since admission  Likely ectopic foci of the above    Rec:  · Continue antibiotics as above  · Recommend surgical evaluation for drainage  · Serial exams     5   Abnormal CT A/P  CT abdomen pelvis:  Streaky renal cortical enhancement bilaterally   In setting of probable pulmonary septic emboli   UA bland, urine cx with mixed contaminants and without clinical findings of UTI/pyelonephritis  Rec:  · Continue antibiotic as above  · Serial exam   · Monitor urinary symptoms     6   MSSA bacteremia/possible Endocarditis 2018   Patient had 1 of 2 blood cultures positive for MSSA on 11/25/2018 11/27/18 MELISSA showed questionable "small linear echodensity  "  Repeat MELISSA done on 12/4 shows "no definite evidence of endocarditis," and "previously visualized echodensity on anterior leaflet not identified  "  Patient completed 15 days of IV Cefazolin per ID at [facility]   Patient was to have surveillance blood cultures 1-2 weeks after antibiotics complete, but these do not appear to have been obtained at [facility]  Rec:  · Antibiotics as above  · Close clinical follow-up  · Additional imaging as above     7   IVDU:  Recent IVDU 07/08/2020 and Urine toxicology screen was positive for THC, cocaine, and opiates   Picture above is concerning for endocarditis  And this is likely source of bacteremia  Patient is aware that she is not a candidate for home IV antibiotics  Rec:  · Antibiotics as above  · Serial exam   · Pain management per primary care team   · Patient is not candidate for PICC line/home antibiotics  · Plan is for 6 weeks of antibiotics      8   Hepatitis C:  Untreated as of yet   Patient did not follow-up with [facility] as advised  HIV screening negative  Recommend follow-up with GI as outpatient  Above plan discussed in detail with the patient, nursing, and primary service resident  ID consult service will continue to follow      Antibiotics:  Cefazolin 4  Total antibiotic 6    24 hour events:  No acute events noted overnight on chart review  Currently afebrile  White blood cell count 6 6  Cultures from 07/18 so far without growth  No new imaging overnight  Patient's other vitals are stable  Labs including LFTs unremarkable  HIV testing negative    Subjective:  Patient currently denies having any nausea, vomiting, chest pain or shortness of breath  She reports having mid thoracic back pain which is radiating  Denies any urinary retention or fecal incontinence  She also reports significant pain at her ankle  Abscess noted on exam and clinical images taken  Objective:  Vitals:  Temp:  [97 5 °F (36 4 °C)-98 5 °F (36 9 °C)] 98 °F (36 7 °C)  HR:  [62-71] 65  Resp:  [16-18] 16  BP: (113-137)/(75-91) 117/82  SpO2:  [97 %-99 %] 99 %  Temp (24hrs), Av °F (36 7 °C), Min:97 5 °F (36 4 °C), Max:98 5 °F (36 9 °C)  Current: Temperature: 98 °F (36 7 °C)    Physical Exam:   General Appearance:  Alert, interactive, nontoxic, no acute distress  Throat: Oropharynx moist without lesions  Lungs:   Clear to auscultation bilaterally; no wheezes, rhonchi or rales; respirations unlabored on room air   Heart:  RRR; no murmur, rub or gallop appreciated   Abdomen:   Soft, non-tender, non-distended, positive bowel sounds  Extremities: No clubbing, cyanosis or edema   Skin: No new rashes or lesions  No draining wounds noted  Patient has a large fluctuant lesion on her left ankle with purulent fluid within it  It is tender to palpation    No other lesions appreciated on skin exam        Labs, Imaging, & Other studies:   All pertinent labs and imaging studies were personally reviewed  Results from last 7 days   Lab Units 20  0816 20  1059 07/15/20  0928   WBC Thousand/uL 6 60 4 43 8 49   HEMOGLOBIN g/dL 9 5* 9 6* 11 3*   PLATELETS Thousands/uL 306 242 207     Results from last 7 days   Lab Units 20  0821   POTASSIUM mmol/L 3 7   CHLORIDE mmol/L 106   CO2 mmol/L 25   BUN mg/dL 5   CREATININE mg/dL 0 56*   EGFR ml/min/1 73sq m 128   CALCIUM mg/dL 7 8*   AST U/L 10   ALT U/L 11*   ALK PHOS U/L 97     Results from last 7 days   Lab Units 20  0913 20  0902 07/15/20  2315 07/15/20  2314 07/15/20  0932   BLOOD CULTURE  No Growth at 24 hrs  No Growth at 24 hrs   Staphylococcus aureus* Staphylococcus aureus*  --    GRAM STAIN RESULT   --   --  Gram positive cocci in clusters* Gram positive cocci in clusters*  --    URINE CULTURE   --   --   --   --  40,000-49,000 cfu/ml

## 2020-07-20 NOTE — ASSESSMENT & PLAN NOTE
· Patient's history of heroin abuse - clean x 1 year then used 1 week ago (IV heroin), as a compensatory mechanism to deal with emotions  · No signs of withdrawal at this time  · Limit opioids  Did receive Dilaudid in the emergency department  Discussed with patient that we will give only minimal opioids  · Started on Percocet last evening with significant improvement in her pain  Continue on Toradol on low-dose Percocet as needed  · Patient aware of no escalation in pain management  · Prior endocarditis 2018  · Patient is emotional about her substance abuse and wants to quit   Placed psych eval for substance abuse dependence

## 2020-07-20 NOTE — PROGRESS NOTES
Progress Note - Shellye Nick 1990, 34 y o  female MRN: [id number]    Unit/Bed#: S -54 Encounter: [id number]    Primary Care Provider: Princess Cassandra PA-C   Date and time admitted to hospital: 7/15/2020  8:41 AM        Bacteremia due to Staphylococcus aureus  Assessment & Plan    · Pt  Is IV drug abuser, could be source of her bacteremia  · Blood Cx on 7/15- Staph aureus, Blood Cx- no growth at 24 hrs, another set of blood Cx drawn today  ·  Will Monitor Blood CX  · Ancef per infectious disease  TTE- There is an approximately 1cm atrial aspect mass on the free wall leaflet of tricuspid valve  There appears to be highly eccentric tricuspid regurgitation and may represent leaflet perforation  There was moderate regurgitation  ID recommends MELISSA to R/O Endocarditis   · Consulted cardiology for MELISSA   NPO since midnight  · ID also recommends- MRI T spine, will follow-up imaging          * Sepsis (HCC)resolved as of 7/20/2020  Assessment & Plan  · Tachycardia and tachypnea noted on admission  · Sepsis now resolved  · Ceftriaxone dc'ed, start Ancef and continue Vanco   · Procalcitonin elevated  Trending down as of July 18th will repeat tomorrow  · Urine culture mixed contaminant  · Blood cultures July 15th positive Staph aureus   · repeat blood cultures drawn July 18th so far negative  · Patient afebrile since July 15, 2020      Loculated pleural effusion  Assessment & Plan  · CT on admission:  Partial evaluation of a loculated right basilar pleural effusion with right middle lobe opacity likely reflecting atelectasis with infiltrate not excluded  · Source Could be likely from her Septic embolus   · Pt  Has tenderness and is still not able to take deep inspiration,also complains of back pain   Will consider a trial dose of Robaxan 800 mg, likely that she has some spasmic component  · Consulted IR for further evaluation of drainage of loculated effusion      Heroin abuse ([facility] [address] )  Assessment & Plan  · Patient's history of heroin abuse - clean x 1 year then used 1 week ago (IV heroin), as a compensatory mechanism to deal with emotions  · No signs of withdrawal at this time  · Limit opioids  Did receive Dilaudid in the emergency department  Discussed with patient that we will give only minimal opioids  · Started on Percocet last evening with significant improvement in her pain  Continue on Toradol on low-dose Percocet as needed  · Patient aware of no escalation in pain management  · Prior endocarditis 2018  · Patient is emotional about her substance abuse and wants to quit  Placed psych eval for substance abuse dependence    Insomnia  Assessment & Plan  Patient states that she had been taking Remeron 30 mg every day  She has been unable to sleep without medication  Will start Remeron 30 mg bedtime    Abscess of left foot  Assessment & Plan  · Probable etiology- septic embolus or the site of injection  · Consulted podiatry for further eval      Acute pyelonephritis  Assessment & Plan  · CT: Somewhat streaky opacification of the renal cortex bilaterally consistent with pyelonephritis, uncomplicated  · Urinalysis fairly unremarkable with no evidence of nitrates, 4-10 WBCs and occasional bacteria  · Urine culture- 40,000- 49,000 cfu/ml  · Patient tachycardic and tachypneic on admission with significant CVA tenderness  · Continue antibiotics per Infectious Disease  · Urine culture mixed contaminant not completely clear this is actual pyelonephritis   · patient  has clinically improved          VTE Pharmacologic Prophylaxis:   Pharmacologic: Not indicated at this time because of low VTE score  Mechanical VTE Prophylaxis in Place: No    Discussions with Specialists or Other Care Team Provider: ID    Education and Discussions with Family / Patient:  Will call Family    Current Length of Stay: 5 day(s)    Current Patient Status: Inpatient     Discharge Plan / Estimated Discharge Date: TBD    Code Status: Level 1 - Full Code      Subjective:   Pt  Is Alert, oriented*3, is agitated that she didn't get any sleep  she wanted to take Remeron, Which she says is prescribed to her outpt , Has abscess on left foot , was wrapped in bandage  She also complains of back pain bilaterally, radiating from left to right side  Pain on inspiration, unable to sleep on her right side  Pain is not controlled with Percocet and Ketorolac  Patient was very emotional about her substance abuse  She states that it is her compensatory mechanism to deal with the emotions  Objective:     Vitals:   Temp (24hrs), Av 3 °F (36 8 °C), Min:98 °F (36 7 °C), Max:98 5 °F (36 9 °C)    Temp:  [98 °F (36 7 °C)-98 5 °F (36 9 °C)] 98 3 °F (36 8 °C)  HR:  [55-65] 55  Resp:  [16-20] 20  BP: (117-137)/(82-91) 136/89  SpO2:  [96 %-99 %] 96 %  Body mass index is 26 45 kg/m²  Input and Output Summary (last 24 hours): Intake/Output Summary (Last 24 hours) at 2020 1721  Last data filed at 2020 1003  Gross per 24 hour   Intake 240 ml   Output    Net 240 ml       Physical Exam:     Physical Exam   Constitutional: She is oriented to person, place, and time  She appears well-developed and well-nourished  HENT:   Head: Normocephalic and atraumatic  Cardiovascular: Normal rate and regular rhythm  Murmur heard  Pulmonary/Chest: Effort normal    Breath sounds decreased on right side   Abdominal: Soft  Bowel sounds are normal    Neurological: She is alert and oriented to person, place, and time  Skin: Skin is warm and dry         Additional Data:     Labs:    Results from last 7 days   Lab Units 20  0816   WBC Thousand/uL 6 60   HEMOGLOBIN g/dL 9 5*   HEMATOCRIT % 29 1*   PLATELETS Thousands/uL 306   LYMPHO PCT % 35   MONO PCT % 4   EOS PCT % 0     Results from last 7 days   Lab Units 20  0821   POTASSIUM mmol/L 3 7   CHLORIDE mmol/L 106   CO2 mmol/L 25   BUN mg/dL 5   CREATININE mg/dL 0 56*   CALCIUM mg/dL 7 8*   ALK PHOS U/L 97   ALT U/L 11*   AST U/L 10           * I Have Reviewed All Lab Data Listed Above  * Additional Pertinent Lab Tests Reviewed: All Labs Within Last 24 Hours Reviewed    Imaging:    Imaging Reports Reviewed Today Include: TTE  Imaging Personally Reviewed by Myself Includes:  None    Recent Cultures (last 7 days):     Results from last 7 days   Lab Units 07/18/20  0913 07/18/20  0902 07/15/20  2316 07/15/20  2314 07/15/20  0932   BLOOD CULTURE  No Growth at 48 hrs  No Growth at 48 hrs  Staphylococcus aureus* Staphylococcus aureus*  --    GRAM STAIN RESULT   --   --  Gram positive cocci in clusters* Gram positive cocci in clusters*  --    URINE CULTURE   --   --   --   --  40,000-49,000 cfu/ml        Last 24 Hours Medication List:     Current Facility-Administered Medications:  acetaminophen 650 mg Oral Q6H PRN Kesha Chen PA-C    cefazolin 2,000 mg Intravenous Q8H Palma Boland DO Last Rate: 2,000 mg (07/20/20 1611)   ketorolac 15 mg Intravenous Q6H PRN Young Owusu LipMILLIE boyd    Or        ketorolac 30 mg Intravenous Q6H PRN Young PETER PathakMILLIE    lidocaine 1 patch Topical Daily Kesha Chen PA-C    melatonin 6 mg Oral HS Stephon Blancas PA-C    methocarbamol 500 mg Oral Q6H PRN Ellie Mendez MD    mirtazapine 30 mg Oral HS Ellie Mendez MD    ondansetron 4 mg Intravenous Q6H PRN Kesha Chen PA-C    oxyCODONE-acetaminophen 2 tablet Oral Q4H PRN Nahomi Alanis MD    sodium chloride 75 mL/hr Intravenous Continuous Kesha Chen PA-C Last Rate: 75 mL/hr (07/20/20 0400)        Today, Patient Was Seen By: Ellie Mendez MD    ** Please Note: This note has been constructed using a voice recognition system   **

## 2020-07-21 ENCOUNTER — APPOINTMENT (INPATIENT)
Dept: MRI IMAGING | Facility: HOSPITAL | Age: 30
DRG: 720 | End: 2020-07-21
Payer: COMMERCIAL

## 2020-07-21 ENCOUNTER — ANESTHESIA (INPATIENT)
Dept: NON INVASIVE DIAGNOSTICS | Facility: HOSPITAL | Age: 30
DRG: 720 | End: 2020-07-21
Payer: COMMERCIAL

## 2020-07-21 LAB
ANION GAP SERPL CALCULATED.3IONS-SCNC: 11 MMOL/L (ref 4–13)
ANISOCYTOSIS BLD QL SMEAR: PRESENT
BASOPHILS # BLD MANUAL: 0 THOUSAND/UL (ref 0–0.1)
BASOPHILS NFR MAR MANUAL: 0 % (ref 0–1)
BUN SERPL-MCNC: 6 MG/DL (ref 5–25)
CALCIUM SERPL-MCNC: 8.3 MG/DL (ref 8.3–10.1)
CHLORIDE SERPL-SCNC: 104 MMOL/L (ref 100–108)
CO2 SERPL-SCNC: 25 MMOL/L (ref 21–32)
CREAT SERPL-MCNC: 0.67 MG/DL (ref 0.6–1.3)
EOSINOPHIL # BLD MANUAL: 0.24 THOUSAND/UL (ref 0–0.4)
EOSINOPHIL NFR BLD MANUAL: 3 % (ref 0–6)
ERYTHROCYTE [DISTWIDTH] IN BLOOD BY AUTOMATED COUNT: 13.6 % (ref 11.6–15.1)
GFR SERPL CREATININE-BSD FRML MDRD: 121 ML/MIN/1.73SQ M
GLUCOSE SERPL-MCNC: 95 MG/DL (ref 65–140)
HCT VFR BLD AUTO: 31.5 % (ref 34.8–46.1)
HGB BLD-MCNC: 10.4 G/DL (ref 11.5–15.4)
LYMPHOCYTES # BLD AUTO: 2.31 THOUSAND/UL (ref 0.6–4.47)
LYMPHOCYTES # BLD AUTO: 29 % (ref 14–44)
MCH RBC QN AUTO: 28.9 PG (ref 26.8–34.3)
MCHC RBC AUTO-ENTMCNC: 33 G/DL (ref 31.4–37.4)
MCV RBC AUTO: 88 FL (ref 82–98)
METAMYELOCYTES NFR BLD MANUAL: 2 % (ref 0–1)
MONOCYTES # BLD AUTO: 0.48 THOUSAND/UL (ref 0–1.22)
MONOCYTES NFR BLD: 6 % (ref 4–12)
MYELOCYTES NFR BLD MANUAL: 2 % (ref 0–1)
NEUTROPHILS # BLD MANUAL: 4.14 THOUSAND/UL (ref 1.85–7.62)
NEUTS BAND NFR BLD MANUAL: 1 % (ref 0–8)
NEUTS SEG NFR BLD AUTO: 51 % (ref 43–75)
NRBC BLD AUTO-RTO: 0 /100 WBCS
PLATELET # BLD AUTO: 426 THOUSANDS/UL (ref 149–390)
PLATELET BLD QL SMEAR: ABNORMAL
PMV BLD AUTO: 8.8 FL (ref 8.9–12.7)
POLYCHROMASIA BLD QL SMEAR: PRESENT
POTASSIUM SERPL-SCNC: 3.4 MMOL/L (ref 3.5–5.3)
RBC # BLD AUTO: 3.6 MILLION/UL (ref 3.81–5.12)
SODIUM SERPL-SCNC: 140 MMOL/L (ref 136–145)
TOTAL CELLS COUNTED SPEC: 100
VARIANT LYMPHS # BLD AUTO: 6 %
WBC # BLD AUTO: 7.97 THOUSAND/UL (ref 4.31–10.16)

## 2020-07-21 PROCEDURE — 99232 SBSQ HOSP IP/OBS MODERATE 35: CPT | Performed by: INTERNAL MEDICINE

## 2020-07-21 PROCEDURE — 87040 BLOOD CULTURE FOR BACTERIA: CPT | Performed by: INTERNAL MEDICINE

## 2020-07-21 PROCEDURE — 99254 IP/OBS CNSLTJ NEW/EST MOD 60: CPT | Performed by: PODIATRIST

## 2020-07-21 PROCEDURE — 87186 SC STD MICRODIL/AGAR DIL: CPT | Performed by: PODIATRIST

## 2020-07-21 PROCEDURE — 93325 DOPPLER ECHO COLOR FLOW MAPG: CPT | Performed by: INTERNAL MEDICINE

## 2020-07-21 PROCEDURE — 87147 CULTURE TYPE IMMUNOLOGIC: CPT | Performed by: PODIATRIST

## 2020-07-21 PROCEDURE — 85027 COMPLETE CBC AUTOMATED: CPT | Performed by: INTERNAL MEDICINE

## 2020-07-21 PROCEDURE — 10060 I&D ABSCESS SIMPLE/SINGLE: CPT | Performed by: PODIATRIST

## 2020-07-21 PROCEDURE — 80048 BASIC METABOLIC PNL TOTAL CA: CPT | Performed by: INTERNAL MEDICINE

## 2020-07-21 PROCEDURE — 87070 CULTURE OTHR SPECIMN AEROBIC: CPT | Performed by: PODIATRIST

## 2020-07-21 PROCEDURE — 85007 BL SMEAR W/DIFF WBC COUNT: CPT | Performed by: INTERNAL MEDICINE

## 2020-07-21 PROCEDURE — 93312 ECHO TRANSESOPHAGEAL: CPT

## 2020-07-21 PROCEDURE — 93312 ECHO TRANSESOPHAGEAL: CPT | Performed by: INTERNAL MEDICINE

## 2020-07-21 PROCEDURE — 99222 1ST HOSP IP/OBS MODERATE 55: CPT | Performed by: PSYCHIATRY & NEUROLOGY

## 2020-07-21 PROCEDURE — NC001 PR NO CHARGE: Performed by: INTERNAL MEDICINE

## 2020-07-21 PROCEDURE — 87205 SMEAR GRAM STAIN: CPT | Performed by: PODIATRIST

## 2020-07-21 PROCEDURE — 93320 DOPPLER ECHO COMPLETE: CPT | Performed by: INTERNAL MEDICINE

## 2020-07-21 RX ORDER — PROPOFOL 10 MG/ML
INJECTION, EMULSION INTRAVENOUS AS NEEDED
Status: DISCONTINUED | OUTPATIENT
Start: 2020-07-21 | End: 2020-07-21 | Stop reason: SURG

## 2020-07-21 RX ORDER — LORAZEPAM 2 MG/ML
1 INJECTION INTRAMUSCULAR ONCE
Status: DISCONTINUED | OUTPATIENT
Start: 2020-07-21 | End: 2020-07-22 | Stop reason: HOSPADM

## 2020-07-21 RX ORDER — KETOROLAC TROMETHAMINE 30 MG/ML
30 INJECTION, SOLUTION INTRAMUSCULAR; INTRAVENOUS EVERY 6 HOURS PRN
Status: DISCONTINUED | OUTPATIENT
Start: 2020-07-21 | End: 2020-07-22 | Stop reason: HOSPADM

## 2020-07-21 RX ORDER — KETOROLAC TROMETHAMINE 30 MG/ML
15 INJECTION, SOLUTION INTRAMUSCULAR; INTRAVENOUS EVERY 6 HOURS PRN
Status: DISCONTINUED | OUTPATIENT
Start: 2020-07-21 | End: 2020-07-22 | Stop reason: HOSPADM

## 2020-07-21 RX ORDER — SODIUM CHLORIDE, SODIUM LACTATE, POTASSIUM CHLORIDE, CALCIUM CHLORIDE 600; 310; 30; 20 MG/100ML; MG/100ML; MG/100ML; MG/100ML
INJECTION, SOLUTION INTRAVENOUS CONTINUOUS PRN
Status: DISCONTINUED | OUTPATIENT
Start: 2020-07-21 | End: 2020-07-21 | Stop reason: SURG

## 2020-07-21 RX ORDER — KETAMINE HYDROCHLORIDE 50 MG/ML
INJECTION, SOLUTION, CONCENTRATE INTRAMUSCULAR; INTRAVENOUS AS NEEDED
Status: DISCONTINUED | OUTPATIENT
Start: 2020-07-21 | End: 2020-07-21 | Stop reason: SURG

## 2020-07-21 RX ORDER — PROPOFOL 10 MG/ML
INJECTION, EMULSION INTRAVENOUS CONTINUOUS PRN
Status: DISCONTINUED | OUTPATIENT
Start: 2020-07-21 | End: 2020-07-21 | Stop reason: SURG

## 2020-07-21 RX ORDER — FENTANYL CITRATE 50 UG/ML
INJECTION, SOLUTION INTRAMUSCULAR; INTRAVENOUS AS NEEDED
Status: DISCONTINUED | OUTPATIENT
Start: 2020-07-21 | End: 2020-07-21 | Stop reason: SURG

## 2020-07-21 RX ORDER — LIDOCAINE HYDROCHLORIDE 10 MG/ML
INJECTION, SOLUTION EPIDURAL; INFILTRATION; INTRACAUDAL; PERINEURAL AS NEEDED
Status: DISCONTINUED | OUTPATIENT
Start: 2020-07-21 | End: 2020-07-21 | Stop reason: SURG

## 2020-07-21 RX ADMIN — KETOROLAC TROMETHAMINE 30 MG: 30 INJECTION, SOLUTION INTRAMUSCULAR at 20:29

## 2020-07-21 RX ADMIN — MELATONIN 6 MG: at 21:54

## 2020-07-21 RX ADMIN — OXYCODONE HYDROCHLORIDE AND ACETAMINOPHEN 2 TABLET: 5; 325 TABLET ORAL at 21:53

## 2020-07-21 RX ADMIN — OXYCODONE HYDROCHLORIDE AND ACETAMINOPHEN 2 TABLET: 5; 325 TABLET ORAL at 08:06

## 2020-07-21 RX ADMIN — KETAMINE HYDROCHLORIDE 30 MG: 50 INJECTION INTRAMUSCULAR; INTRAVENOUS at 10:43

## 2020-07-21 RX ADMIN — CEFAZOLIN SODIUM 2000 MG: 2 SOLUTION INTRAVENOUS at 08:05

## 2020-07-21 RX ADMIN — MIRTAZAPINE 30 MG: 15 TABLET, FILM COATED ORAL at 21:54

## 2020-07-21 RX ADMIN — PROPOFOL 120 MCG/KG/MIN: 10 INJECTION, EMULSION INTRAVENOUS at 10:43

## 2020-07-21 RX ADMIN — KETOROLAC TROMETHAMINE 30 MG: 30 INJECTION, SOLUTION INTRAMUSCULAR at 06:29

## 2020-07-21 RX ADMIN — FENTANYL CITRATE 25 MCG: 50 INJECTION INTRAMUSCULAR; INTRAVENOUS at 10:43

## 2020-07-21 RX ADMIN — OXYCODONE HYDROCHLORIDE AND ACETAMINOPHEN 2 TABLET: 5; 325 TABLET ORAL at 17:23

## 2020-07-21 RX ADMIN — SODIUM CHLORIDE, SODIUM LACTATE, POTASSIUM CHLORIDE, AND CALCIUM CHLORIDE: .6; .31; .03; .02 INJECTION, SOLUTION INTRAVENOUS at 10:04

## 2020-07-21 RX ADMIN — SODIUM CHLORIDE 75 ML/HR: 0.9 INJECTION, SOLUTION INTRAVENOUS at 14:27

## 2020-07-21 RX ADMIN — PROPOFOL 100 MG: 10 INJECTION, EMULSION INTRAVENOUS at 10:43

## 2020-07-21 RX ADMIN — CEFAZOLIN SODIUM 2000 MG: 2 SOLUTION INTRAVENOUS at 00:49

## 2020-07-21 RX ADMIN — KETOROLAC TROMETHAMINE 30 MG: 30 INJECTION, SOLUTION INTRAMUSCULAR at 14:21

## 2020-07-21 RX ADMIN — LIDOCAINE HYDROCHLORIDE 50 MG: 10 INJECTION, SOLUTION EPIDURAL; INFILTRATION; INTRACAUDAL; PERINEURAL at 10:43

## 2020-07-21 RX ADMIN — CEFAZOLIN SODIUM 2000 MG: 2 SOLUTION INTRAVENOUS at 17:24

## 2020-07-21 NOTE — ASSESSMENT & PLAN NOTE
· CT: Somewhat streaky opacification of the renal cortex bilaterally consistent with pyelonephritis, uncomplicated  · Urinalysis fairly unremarkable with no evidence of nitrates, 4-10 WBCs and occasional bacteria    · Urine culture- 40,000- 49,000 cfu/ml  · Patient tachycardic and tachypneic on admission with significant CVA tenderness- likely from her right loculated pleural effusion  · Continue antibiotics per Infectious Disease  · Urine culture mixed contaminant not completely clear this is actual pyelonephritis   · patient  has clinically improved

## 2020-07-21 NOTE — CONSULTS
Consult - [phone] 34 y o  female MRN: [id number]  Unit/Bed#: S -01 Encounter: [id number]    Assessment/Plan     Assessment:  1  Left foot abscess and cellulitis   2  IVDA  3  Sepsis bacteremia (MSSA)    Plan:  1  Patient has purulent draining abscess on the lateral aspect of her left foot  I did do a bedside puncture to the abscess to drain  I did take a culture but I suspect this will be MSSA to match with her blood cultures  At the moment the patient is not agreeable to any surgical incision and drainage of her foot because she states it is too much surgery    Overall this abscess seems relatively isolated to the subdermal area of her left foot  Low suspicion for bone infection at this point  With the abscess draining we will monitor over the next 24 hours  Should this area failed to improve I would insist on a surgical incision and drainage  Follow-up wound cultures  Compressive dressing placed over the abscess  History of Present Illness     HPI:  Claude Hernandez is a 34 y o  female who presents with sepsis bacteremia stemming from IV drug abuse (heroin)  Patient has MSSA bacteremia  Over the last few days a bug bite has started to blister on the left side of her foot  Patient states she is not injected into her foot but can not say with 248% certainty that she might have for gotten  She thinks it is from a bug bite walking in the woods  The area is very tender to palpation       Consults  Review of Systems   Constitutional:  Restless  HENT: Negative  Eyes: Negative  Respiratory: Negative  Cardiovascular: Negative  Gastrointestinal: Negative  Musculoskeletal:  Back pain, left foot pain   Skin:  Hemorrhagic blister left foot with surrounding redness   Neurological: Negative      Psych: negative      Historical Information   Past Medical History:   Diagnosis Date    Abnormal Pap smear of cervix     Anxiety     Depression     Endocarditis     2018    Hepatitis C     HPV (human papilloma virus) anogenital infection      Past Surgical History:   Procedure Laterality Date    KNEE SURGERY Left     MOUTH SURGERY       Social History   Social History     Substance and Sexual Activity   Alcohol Use No     Social History     Substance and Sexual Activity   Drug Use Yes    Types: Heroin    Comment: last use - one week ago     Social History     Tobacco Use   Smoking Status Former Smoker    Packs/day: 0 25    Types: Cigarettes    Last attempt to quit: 11/9/2016    Years since quitting: 3 6   Smokeless Tobacco Never Used   Tobacco Comment    current everyday smoker per [place]     Family History: History reviewed  No pertinent family history      Meds/Allergies   Medications Prior to Admission   Medication    OLANZapine (ZyPREXA) 10 mg tablet    benzocaine-menthol-lanolin-aloe (DERMOPLAST) 20-0 5 % topical spray    docusate sodium (COLACE) 100 mg capsule    hydrocortisone 1 % cream    ibuprofen (MOTRIN) 600 mg tablet    PARoxetine (PAXIL-CR) 12 5 mg 24 hr tablet     Allergies   Allergen Reactions    Latex     Pollen Extract        Objective   First Vitals:   Blood Pressure: 133/83 (07/15/20 0847)  Pulse: (!) 120 (07/15/20 0845)  Temperature: 98 °F (36 7 °C) (07/15/20 0859)  Temp Source: Oral (07/15/20 1427)  Respirations: (!) 24 (07/15/20 0847)  Height: 5' 5" (165 1 cm) (07/15/20 1427)  Weight - Scale: 72 1 kg (158 lb 15 2 oz) (07/15/20 1427)  SpO2: 93 % (07/15/20 0847)    Current Vitals:   Blood Pressure: 131/74 (07/20/20 2219)  Pulse: 65 (07/20/20 2219)  Temperature: 98 2 °F (36 8 °C) (07/20/20 2219)  Temp Source: Oral (07/20/20 2219)  Respirations: 18 (07/20/20 2219)  Height: 5' 5" (165 1 cm) (07/15/20 1427)  Weight - Scale: 72 1 kg (158 lb 15 2 oz) (07/15/20 1427)  SpO2: 95 % (07/20/20 2219)        /74 (BP Location: Right arm)   Pulse 65   Temp 98 2 °F (36 8 °C) (Oral)   Resp 18   Ht 5' 5" (1 651 m)   Wt 72 1 kg (158 lb 15 2 oz)   SpO2 95%   BMI 26 45 kg/m²   Physical Exam:  General:    Alert, cooperative, patient appears agitated and nervous   Head:     Normocephalic, without obvious abnormality, atraumatic                   Skin:   There is a hemorrhagic blister with surrounding cellulitis to the lateral aspect of the left foot  There appears to be cloudiness and purulence within this blistered area  It is very tender to palpation  Lungs:     Respirations unlabored   Chest wall:    No tenderness or deformity   Heart/vasc:    Regular rate and rhythm  Palpable pedal pulses  Capillary refills brisk  Abdomen:     Soft, non-tender, no distention           Lower MSK:   No calf pain with compression  Ankle range of motion intact without pain  Negative Homans sign  Psychiatric:  AAOx3, no depression           Neurologic:   Normal pinprick light touch and vibratory sensation bilateral lower extremity  Achilles reflex intact       Lab Results:   Admission on 07/15/2020   Component Date Value    WBC 07/15/2020 8 49     RBC 07/15/2020 3 97     Hemoglobin 07/15/2020 11 3*    Hematocrit 07/15/2020 32 9*    MCV 07/15/2020 83     MCH 07/15/2020 28 5     MCHC 07/15/2020 34 3     RDW 07/15/2020 12 6     MPV 07/15/2020 9 6     Platelets 76/72/1771 207     nRBC 07/15/2020 0     EXT PREG TEST UR (Ref: N* 07/15/2020 NEGATIVE     Control 07/15/2020 VALID     Color, UA 07/15/2020 Yellow     Clarity, UA 07/15/2020 Slightly Cloudy     Specific Gravity, UA 07/15/2020 1 025     pH, UA 07/15/2020 6 0     Leukocytes, UA 07/15/2020 Negative     Nitrite, UA 07/15/2020 Negative     Protein, UA 07/15/2020 100 (2+)*    Glucose, UA 07/15/2020 Negative     Ketones, UA 07/15/2020 Negative     Urobilinogen, UA 07/15/2020 1 0     Bilirubin, UA 07/15/2020 Negative     Blood, UA 07/15/2020 Small*    Sodium 07/15/2020 125*    Potassium 07/15/2020 3 3*    Chloride 07/15/2020 90*    CO2 07/15/2020 22     ANION GAP 07/15/2020 13     BUN 07/15/2020 8     Creatinine 07/15/2020 0 86     Glucose 07/15/2020 144*    Calcium 07/15/2020 7 9*    eGFR 07/15/2020 93     RBC, UA 07/15/2020 None Seen     WBC, UA 07/15/2020 4-10*    Epithelial Cells 07/15/2020 Occasional     Bacteria, UA 07/15/2020 Occasional     SARS-CoV-2 07/15/2020 Negative     Segmented % 07/15/2020 66     Bands % 07/15/2020 6     Lymphocytes % 07/15/2020 15     Monocytes % 07/15/2020 13*    Eosinophils, % 07/15/2020 0     Basophils % 07/15/2020 0     Absolute Neutrophils 07/15/2020 6 11     Lymphocytes Absolute 07/15/2020 1 27     Monocytes Absolute 07/15/2020 1 10     Eosinophils Absolute 07/15/2020 0 00     Basophils Absolute 07/15/2020 0 00     Total Counted 07/15/2020 100     RBC Morphology 07/15/2020 Normal     Platelet Estimate 76/51/9495 Adequate     Urine Culture 07/15/2020 40,000-49,000 cfu/ml      Sodium 07/15/2020 127*    Potassium 07/15/2020 3 3*    Chloride 07/15/2020 94*    CO2 07/15/2020 24     ANION GAP 07/15/2020 9     BUN 07/15/2020 12     Creatinine 07/15/2020 0 90     Glucose 07/15/2020 108     Calcium 07/15/2020 7 6*    eGFR 07/15/2020 88     TSH 3RD GENERATON 07/15/2020 0 512     Osmolality Serum 07/15/2020 270*    Osmolality, Ur 07/15/2020 440     Sodium, Ur 07/15/2020 6     Uric Acid 07/15/2020 4 6     Amph/Meth UR 07/15/2020 Negative     Barbiturate Ur 07/15/2020 Negative     Benzodiazepine Urine 07/15/2020 Negative     Cocaine Urine 07/15/2020 Positive*    Methadone Urine 07/15/2020 Negative     Opiate Urine 07/15/2020 Positive*    PCP Ur 07/15/2020 Negative     THC Urine 07/15/2020 Positive*    Oxycodone Urine 07/15/2020 Negative     Procalcitonin 07/15/2020 2 63*    Blood Culture 07/15/2020 Staphylococcus aureus*    Gram Stain Result 07/15/2020 Gram positive cocci in clusters*    Blood Culture 07/15/2020 Staphylococcus aureus*    Gram Stain Result 07/15/2020 Gram positive cocci in clusters*    Procalcitonin 07/17/2020 1 25*    Sodium 07/17/2020 137     Potassium 07/17/2020 3 6     Chloride 07/17/2020 104     CO2 07/17/2020 24     ANION GAP 07/17/2020 9     BUN 07/17/2020 6     Creatinine 07/17/2020 0 74     Glucose 07/17/2020 100     Calcium 07/17/2020 8 2*    eGFR 07/17/2020 111     HIV-1/HIV-2 Ab 07/17/2020 Non-Reactive     Sodium 07/18/2020 137     Potassium 07/18/2020 3 7     Chloride 07/18/2020 106     CO2 07/18/2020 20*    ANION GAP 07/18/2020 11     BUN 07/18/2020 6     Creatinine 07/18/2020 0 58*    Glucose 07/18/2020 98     Calcium 07/18/2020 7 6*    eGFR 07/18/2020 127     Procalcitonin 07/18/2020 0 57*    Blood Culture 07/18/2020 No Growth at 48 hrs   Blood Culture 07/18/2020 No Growth at 48 hrs       WBC 07/18/2020 4 43     RBC 07/18/2020 3 33*    Hemoglobin 07/18/2020 9 6*    Hematocrit 07/18/2020 28 6*    MCV 07/18/2020 86     MCH 07/18/2020 28 8     MCHC 07/18/2020 33 6     RDW 07/18/2020 13 3     MPV 07/18/2020 9 6     Platelets 29/82/1962 242     nRBC 07/18/2020 0     Vancomycin Tr 07/18/2020 8 8*    Segmented % 07/18/2020 59     Bands % 07/18/2020 1     Lymphocytes % 07/18/2020 22     Monocytes % 07/18/2020 7     Eosinophils, % 07/18/2020 1     Basophils % 07/18/2020 0     Myelocytes % 07/18/2020 1     Atypical Lymphocytes % 07/18/2020 9*    Absolute Neutrophils 07/18/2020 2 66     Lymphocytes Absolute 07/18/2020 0 97     Monocytes Absolute 07/18/2020 0 31     Eosinophils Absolute 07/18/2020 0 04     Basophils Absolute 07/18/2020 0 00     Total Counted 07/18/2020 100     Platelet Estimate 18/90/8059 Adequate     Procalcitonin 07/19/2020 0 26*    WBC 07/20/2020 6 60     RBC 07/20/2020 3 33*    Hemoglobin 07/20/2020 9 5*    Hematocrit 07/20/2020 29 1*    MCV 07/20/2020 87     MCH 07/20/2020 28 5     MCHC 07/20/2020 32 6     RDW 07/20/2020 13 2     MPV 07/20/2020 9 3     Platelets 43/98/0185 306     nRBC 07/20/2020 0     Sodium 07/20/2020 138     Potassium 07/20/2020 3 7     Chloride 07/20/2020 106     CO2 07/20/2020 25     ANION GAP 07/20/2020 7     BUN 07/20/2020 5     Creatinine 07/20/2020 0 56*    Glucose 07/20/2020 97     Calcium 07/20/2020 7 8*    AST 07/20/2020 10     ALT 07/20/2020 11*    Alkaline Phosphatase 07/20/2020 97     Total Protein 07/20/2020 6 4     Albumin 07/20/2020 2 0*    Total Bilirubin 07/20/2020 0 14*    eGFR 07/20/2020 128     Segmented % 07/20/2020 53     Lymphocytes % 07/20/2020 35     Monocytes % 07/20/2020 4     Eosinophils, % 07/20/2020 0     Basophils % 07/20/2020 0     Metamyelocytes% 07/20/2020 3*    Atypical Lymphocytes % 07/20/2020 5*    Absolute Neutrophils 07/20/2020 3 50     Lymphocytes Absolute 07/20/2020 2 31     Monocytes Absolute 07/20/2020 0 26     Eosinophils Absolute 07/20/2020 0 00     Basophils Absolute 07/20/2020 0 00     Total Counted 07/20/2020 100     Platelet Estimate 79/82/2515 Adequate      Results from last 7 days   Lab Units 07/15/20  2316 07/15/20  2314   GRAM STAIN RESULT  Gram positive cocci in clusters* Gram positive cocci in clusters*       Results from last 7 days   Lab Units 07/18/20  0913 07/18/20  0902 07/15/20  2316   BLOOD CULTURE  No Growth at 48 hrs  No Growth at 48 hrs  Staphylococcus aureus*                 Imaging: I have personally reviewed pertinent films in PACS      Code Status: Level 1 - Full Code  Advance Directive and Living Will:      Power of :    POLST:        Portions of the record may have been created with voice recognition software  Occasional wrong word or "sound a like" substitutions may have occurred due to the inherent limitations of voice recognition software  Read the chart carefully and recognize, using context, where substitutions have occurred

## 2020-07-21 NOTE — ASSESSMENT & PLAN NOTE
· Probable etiology- septic embolus or the site of injection  · Podiatry drained the wound bedside   Still about 1cm of subdermal swelling with fluctuation, Probable surgical incision and drainage if it does not get better within 24 hours  · Will follow-up on wound cultures

## 2020-07-21 NOTE — QUICK NOTE
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

## 2020-07-21 NOTE — ASSESSMENT & PLAN NOTE
· Pt  Is IV drug abuser, could be source of her bacteremia  · Blood Cx on 7/15- Staph aureus, Blood Cx on 7/18- no growth at 48 hrs, another set of blood Cx drawn today  ·  Will Monitor Blood CX  · Ancef per infectious disease  TTE- There is an approximately 1cm atrial aspect mass on the free wall leaflet of tricuspid valve  There appears to be highly eccentric tricuspid regurgitation and may represent leaflet perforation  There was moderate regurgitation  MELISSA- prelim report on 07/21/2020- Normal cardiac function  Mobile mass seen on tricuspid valve suggestive of vegetation  Severe tricuspid regurgitation    · Will F/U with ID for ABX course for Infective Endocarditis  · ID also recommends- MRI T spine, will follow-up imaging

## 2020-07-21 NOTE — PROCEDURES
MELISSA prelim:    Normal cardiac function  Mobile mass seen on tricuspid valve suggestive of vegetation  Severe tricuspid regurgitation  Patient tolerated procedure without complication

## 2020-07-21 NOTE — ASSESSMENT & PLAN NOTE
· Patient's history of heroin abuse - clean x 1 year then used 1 week ago (IV heroin), as a compensatory mechanism to deal with emotions  · No signs of withdrawal at this time  · Limit opioids  Did receive Dilaudid in the emergency department  · Started on Percocet with significant improvement in her pain  Continue on Toradol on low-dose Percocet as needed  · Patient aware of no escalation in pain management  · Prior endocarditis 2018  · Patient is emotional about her substance abuse and wants to quit    Consulted psychiatry for substance abuse and underlying anxiety and depression

## 2020-07-21 NOTE — PLAN OF CARE
Problem: PAIN - ADULT  Goal: Verbalizes/displays adequate comfort level or baseline comfort level  Description  Interventions:  - Encourage patient to monitor pain and request assistance  - Assess pain using appropriate pain scale  - Administer analgesics based on type and severity of pain and evaluate response  - Implement non-pharmacological measures as appropriate and evaluate response  - Consider cultural and social influences on pain and pain management  - Notify physician/advanced practitioner if interventions unsuccessful or patient reports new pain  Outcome: Progressing     Problem: INFECTION - ADULT  Goal: Absence or prevention of progression during hospitalization  Description  INTERVENTIONS:  - Assess and monitor for signs and symptoms of infection  - Monitor lab/diagnostic results  - Monitor all insertion sites, i e  indwelling lines, tubes, and drains  - Monitor endotracheal if appropriate and nasal secretions for changes in amount and color  - [place] appropriate cooling/warming therapies per order  - Administer medications as ordered  - Instruct and encourage patient and family to use good hand hygiene technique  - Identify and instruct in appropriate isolation precautions for identified infection/condition  Outcome: Progressing  Goal: Absence of fever/infection during neutropenic period  Description  INTERVENTIONS:  - Monitor WBC    Outcome: Progressing     Problem: SAFETY ADULT  Goal: Patient will remain free of falls  Description  INTERVENTIONS:  - Assess patient frequently for physical needs  -  Identify cognitive and physical deficits and behaviors that affect risk of falls    -  [place] fall precautions as indicated by assessment   - Educate patient/family on patient safety including physical limitations  - Instruct patient to call for assistance with activity based on assessment  - Modify environment to reduce risk of injury  - Consider OT/PT consult to assist with strengthening/mobility  Outcome: Progressing  Goal: Maintain or return to baseline ADL function  Description  INTERVENTIONS:  -  Assess patient's ability to carry out ADLs; assess patient's baseline for ADL function and identify physical deficits which impact ability to perform ADLs (bathing, care of mouth/teeth, toileting, grooming, dressing, etc )  - Assess/evaluate cause of self-care deficits   - Assess range of motion  - Assess patient's mobility; develop plan if impaired  - Assess patient's need for assistive devices and provide as appropriate  - Encourage maximum independence but intervene and supervise when necessary  - Involve family in performance of ADLs  - Assess for home care needs following discharge   - Consider OT consult to assist with ADL evaluation and planning for discharge  - Provide patient education as appropriate  Outcome: Progressing  Goal: Maintain or return mobility status to optimal level  Description  INTERVENTIONS:  - Assess patient's baseline mobility status (ambulation, transfers, stairs, etc )    - Identify cognitive and physical deficits and behaviors that affect mobility  - Identify mobility aids required to assist with transfers and/or ambulation (gait belt, sit-to-stand, lift, walker, cane, etc )  - [place] fall precautions as indicated by assessment  - Record patient progress and toleration of activity level on Mobility SBAR; progress patient to next Phase/Stage  - Instruct patient to call for assistance with activity based on assessment  - Consider rehabilitation consult to assist with strengthening/weightbearing, etc   Outcome: Progressing     Problem: DISCHARGE PLANNING  Goal: Discharge to home or other facility with appropriate resources  Description  INTERVENTIONS:  - Identify barriers to discharge w/patient and caregiver  - Arrange for needed discharge resources and transportation as appropriate  - Identify discharge learning needs (meds, wound care, etc )  - Arrange for interpretive services to assist at discharge as needed  - Refer to Case Management Department for coordinating discharge planning if the patient needs post-hospital services based on physician/advanced practitioner order or complex needs related to functional status, cognitive ability, or social support system  Outcome: Progressing

## 2020-07-21 NOTE — ASSESSMENT & PLAN NOTE
Patient stated that she had been taking Remeron 30 mg every day  She has been unable to sleep without medication  started Remeron 30 mg bedtime yesterday, slept through out the night

## 2020-07-21 NOTE — PROGRESS NOTES
Progress Note - Tristen Huerta 1990, 34 y o  female MRN: [id number]    Unit/Bed#: S -96 Encounter: [id number]    Primary Care Provider: Ale Prakash PA-C   Date and time admitted to hospital: 7/15/2020  8:41 AM        Bacteremia due to Staphylococcus aureus  Assessment & Plan    · Pt  Is IV drug abuser, could be source of her bacteremia  · Blood Cx on 7/15- Staph aureus, Blood Cx on 7/18- no growth at 48 hrs, another set of blood Cx drawn today  ·  Will Monitor Blood CX  · Ancef per infectious disease  TTE- There is an approximately 1cm atrial aspect mass on the free wall leaflet of tricuspid valve  There appears to be highly eccentric tricuspid regurgitation and may represent leaflet perforation  There was moderate regurgitation  MELISSA-    · Consulted cardiology for MELISSA   NPO since midnight  · ID also recommends- MRI T spine, will follow-up imaging          Loculated pleural effusion  Assessment & Plan  · CT on admission:  Partial evaluation of a loculated right basilar pleural effusion with right middle lobe opacity likely reflecting atelectasis with infiltrate not excluded  · Source Could be likely from her Septic embolus   · Pt  Has tenderness and is still not able to take deep inspiration,also complains of back pain  Will consider a trial dose of Robaxan 800 mg, likely that she has some spasmic component  ·  Given the small size of the effusion, IR does not recommend thoracentesis at this time   · Will monitor for pain and probably CT    Heroin abuse ([facility] [address] )  Assessment & Plan  · Patient's history of heroin abuse - clean x 1 year then used 1 week ago (IV heroin), as a compensatory mechanism to deal with emotions  · No signs of withdrawal at this time  · Limit opioids  Did receive Dilaudid in the emergency department  Discussed with patient that we will give only minimal opioids  · Started on Percocet last evening with significant improvement in her pain    Continue on Toradol on low-dose Percocet as needed  · Patient aware of no escalation in pain management  · Prior endocarditis 2018  · Patient is emotional about her substance abuse and wants to quit  Will contact Case Management regarding Rehab Facilities    Insomnia  Assessment & Plan  Patient states that she had been taking Remeron 30 mg every day  She has been unable to sleep without medication  started Remeron 30 mg bedtime yesterday, slept through out the night  Abscess of left foot  Assessment & Plan  · Probable etiology- septic embolus or the site of injection  · Podiatry drained the wound bedside  Still about 1cm of subdermal swelling with fluctuation, Probable surgical incision and drainage if it does not get better       Acute pyelonephritis  Assessment & Plan  · CT: Somewhat streaky opacification of the renal cortex bilaterally consistent with pyelonephritis, uncomplicated  · Urinalysis fairly unremarkable with no evidence of nitrates, 4-10 WBCs and occasional bacteria  · Urine culture- 40,000- 49,000 cfu/ml  · Patient tachycardic and tachypneic on admission with significant CVA tenderness- likely from her right loculated pleural effusion  · Continue antibiotics per Infectious Disease  · Urine culture mixed contaminant not completely clear this is actual pyelonephritis   · patient  has clinically improved          VTE Pharmacologic Prophylaxis:   Pharmacologic: Low VTE score on admission  Mechanical VTE Prophylaxis in Place: No    Discussions with Specialists or Other Care Team Provider: ID, IR    Education and Discussions with Family / Patient: Will call patient's family    Current Length of Stay: 6 day(s)    Current Patient Status: Inpatient     Discharge Plan / Estimated Discharge Date: TBD    Code Status: Level 1 - Full Code      Subjective:   Pt  Has slept well overnight  Complains of pain 10/10 in the back this morning       Objective:     Vitals:   Temp (24hrs), Av 3 °F (36 8 °C), Min:98 2 °F (36 8 °C), Max:98 5 °F (36 9 °C)    Temp:  [98 2 °F (36 8 °C)-98 5 °F (36 9 °C)] 98 5 °F (36 9 °C)  HR:  [55-73] 73  Resp:  [18-20] 18  BP: (124-136)/(74-89) 124/79  SpO2:  [95 %-97 %] 97 %  Body mass index is 26 45 kg/m²  Input and Output Summary (last 24 hours): Intake/Output Summary (Last 24 hours) at 7/21/2020 0936  Last data filed at 7/20/2020 1003  Gross per 24 hour   Intake 240 ml   Output    Net 240 ml       Physical Exam:     Physical Exam   Constitutional: She is oriented to person, place, and time  She appears well-developed and well-nourished  HENT:   Head: Normocephalic and atraumatic  Neck: Normal range of motion  Neck supple  Cardiovascular: Normal rate and regular rhythm  Murmur heard  Pulmonary/Chest: Effort normal  She exhibits tenderness (In the right lower lung zones)  Decreased breath sounds on the right   Abdominal: Soft  Bowel sounds are normal  She exhibits no distension  There is no tenderness  There is no rebound and no guarding  Musculoskeletal: Normal range of motion  She exhibits edema (Of left foot)  Feet:   Left Foot:   Skin Integrity: Positive for blister (On left lateral aspect of foot)  Neurological: She is alert and oriented to person, place, and time  Skin: Skin is warm and dry  Additional Data:     Labs:    Results from last 7 days   Lab Units 07/20/20  0816   WBC Thousand/uL 6 60   HEMOGLOBIN g/dL 9 5*   HEMATOCRIT % 29 1*   PLATELETS Thousands/uL 306   LYMPHO PCT % 35   MONO PCT % 4   EOS PCT % 0     Results from last 7 days   Lab Units 07/20/20  0821   POTASSIUM mmol/L 3 7   CHLORIDE mmol/L 106   CO2 mmol/L 25   BUN mg/dL 5   CREATININE mg/dL 0 56*   CALCIUM mg/dL 7 8*   ALK PHOS U/L 97   ALT U/L 11*   AST U/L 10           * I Have Reviewed All Lab Data Listed Above  * Additional Pertinent Lab Tests Reviewed:  All Labs Within Last 24 Hours Reviewed    Imaging:    Imaging Reports Reviewed Today Include:  Echo preliminary  Imaging Personally Reviewed by Myself Includes:  None    Recent Cultures (last 7 days):     Results from last 7 days   Lab Units 07/18/20  0913 07/18/20  0902 07/15/20  2316 07/15/20  2314 07/15/20  0932   BLOOD CULTURE  No Growth at 48 hrs  No Growth at 48 hrs  Staphylococcus aureus* Staphylococcus aureus*  --    GRAM STAIN RESULT   --   --  Gram positive cocci in clusters* Gram positive cocci in clusters*  --    URINE CULTURE   --   --   --   --  40,000-49,000 cfu/ml        Last 24 Hours Medication List:     Current Facility-Administered Medications:  acetaminophen 650 mg Oral Q6H PRN Kesha Chen PA-C    cefazolin 2,000 mg Intravenous Q8H Palma Boland DO Last Rate: 2,000 mg (07/21/20 0805)   ketorolac 15 mg Intravenous Q6H PRN Young Mcnair PA-C    Or        ketorolac 30 mg Intravenous Q6H PRN Young Pathak PA-C    lidocaine 1 patch Topical Daily Kesha Chen PA-C    melatonin 6 mg Oral HS Stephon Blancas PA-C    methocarbamol 500 mg Oral Q6H PRN Nathanael Jeter MD    mirtazapine 30 mg Oral HS Nathanael Jeter MD    ondansetron 4 mg Intravenous Q6H PRN Kesha Chen PA-C    oxyCODONE-acetaminophen 2 tablet Oral Q4H PRN Marquis Tootie MD    sodium chloride 75 mL/hr Intravenous Continuous Kesha Chen PA-C Last Rate: 75 mL/hr (07/20/20 2011)        Today, Patient Was Seen By: Nathanael Jeter MD    ** Please Note: This note has been constructed using a voice recognition system   **
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
## 2020-07-21 NOTE — ASSESSMENT & PLAN NOTE
· CT on admission:  Partial evaluation of a loculated right basilar pleural effusion with right middle lobe opacity likely reflecting atelectasis with infiltrate not excluded  · Source Could be likely from her Septic embolus   · Pt  Has tenderness and is still not able to take deep inspiration,also complains of back pain   Will consider a trial dose of Robaxan 800 mg, likely that she has some spasmic component  ·  Given the small size of the effusion, IR does not recommend thoracentesis at this time   · Will monitor for pain and any signs of sepsis

## 2020-07-21 NOTE — ANESTHESIA POSTPROCEDURE EVALUATION
Post-Op Assessment Note    CV Status:  Stable    Pain management: adequate     Mental Status:  Alert and awake   Hydration Status:  Euvolemic   PONV Controlled:  Controlled   Airway Patency:  Patent   Post Op Vitals Reviewed: Yes      Staff: CRNA           BP   120/62   Temp   98   Pulse  75   Resp   18   SpO2   98

## 2020-07-21 NOTE — ANESTHESIA PREPROCEDURE EVALUATION
Review of Systems/Medical History  Patient summary reviewed  Chart reviewed  No history of anesthetic complications     Cardiovascular  Exercise tolerance (METS): >4,     Pulmonary  Smoker ex-smoker  ,   Comment: Drug abuse , heroine  Last intake was 2 weeks  GI/Hepatic            Endo/Other     GYN       Hematology   Musculoskeletal       Neurology   Psychology   Anxiety, Depression ,              Physical Exam    Airway    Mallampati score: II  TM Distance: >3 FB  Neck ROM: full     Dental   No notable dental hx     Cardiovascular  Cardiovascular exam normal    Pulmonary  Pulmonary exam normal     Other Findings        Anesthesia Plan  ASA Score- 2     Anesthesia Type- IV sedation with anesthesia with ASA Monitors  Additional Monitors:   Airway Plan:         Plan Factors-    Induction-     Postoperative Plan-     Informed Consent- Anesthetic plan and risks discussed with patient  I personally reviewed this patient with the CRNA  Discussed and agreed on the Anesthesia Plan with the CRNA  Karina [place]

## 2020-07-22 ENCOUNTER — APPOINTMENT (INPATIENT)
Dept: MRI IMAGING | Facility: HOSPITAL | Age: 30
DRG: 720 | End: 2020-07-22
Payer: COMMERCIAL

## 2020-07-22 VITALS
SYSTOLIC BLOOD PRESSURE: 128 MMHG | WEIGHT: 158.95 LBS | TEMPERATURE: 98.8 F | RESPIRATION RATE: 16 BRPM | HEART RATE: 72 BPM | DIASTOLIC BLOOD PRESSURE: 80 MMHG | BODY MASS INDEX: 26.48 KG/M2 | OXYGEN SATURATION: 97 % | HEIGHT: 65 IN

## 2020-07-22 PROCEDURE — 99238 HOSP IP/OBS DSCHRG MGMT 30/<: CPT | Performed by: INTERNAL MEDICINE

## 2020-07-22 PROCEDURE — 99024 POSTOP FOLLOW-UP VISIT: CPT | Performed by: PODIATRIST

## 2020-07-22 RX ADMIN — OXYCODONE HYDROCHLORIDE AND ACETAMINOPHEN 2 TABLET: 5; 325 TABLET ORAL at 08:49

## 2020-07-22 RX ADMIN — CEFAZOLIN SODIUM 2000 MG: 2 SOLUTION INTRAVENOUS at 01:31

## 2020-07-22 RX ADMIN — SODIUM CHLORIDE 75 ML/HR: 0.9 INJECTION, SOLUTION INTRAVENOUS at 08:50

## 2020-07-22 NOTE — PROGRESS NOTES
Progress Note - Podiatry  Yari Olsen 34 y o  female MRN: [id number]  Unit/Bed#: S -01 Encounter: [id number]    Assessment  1  Left foot abscess and cellulitis   2  IVDA  3  Sepsis bacteremia (MSSA)    Plan:  1  Patient's left  foot is still draining purulent drainage and the skin is very boggy  At this point I feel our only option is a surgical incision and drainage with washout of this area  Patient is agreeable this morning  Surgery is planned for Thursday  NPO at midnight  Subjective/Objective   Chief Complaint:   Chief Complaint   Patient presents with    Shortness of Breath     c/o right upper back "stabbing pain" and sob x3 days  Subjective: 34 y o  y/o female seen and evaluated at bedside  Patient was unable to tolerate her MRI due to claustrophobia  The back MRI was not done last night    No acute events overnight  Denies N/F/V/SOB/CP/cough/diarrhea/constipation  Blood pressure 113/71, pulse 62, temperature 97 8 °F (36 6 °C), temperature source Oral, resp  rate 18, height 5' 5" (1 651 m), weight 72 1 kg (158 lb 15 2 oz), SpO2 99 %, currently breastfeeding  ,Body mass index is 26 45 kg/m²  Lab Results   Component Value Date    WBC 7 97 07/21/2020    HGB 10 4 (L) 07/21/2020    HCT 31 5 (L) 07/21/2020    MCV 88 07/21/2020     (H) 07/21/2020     Lab Results   Component Value Date    CALCIUM 8 3 07/21/2020    K 3 4 (L) 07/21/2020    CO2 25 07/21/2020     07/21/2020    BUN 6 07/21/2020    CREATININE 0 67 07/21/2020         Invasive Devices     Peripheral Intravenous Line            Peripheral IV 07/21/20 Proximal;Right;Ventral (anterior) Forearm less than 1 day                Physical Exam:   General: alert, cooperative and no distress  Vascular:  Palpable pedal pulses  Dermatology:  Draining abscess lateral aspect left foot with heavy purulent drainage  The area surrounding as boggy    Cellulitis is improved from yesterday's bedside puncture drainage  Neurological:  No deficits bilateral lower extremity  MSK:  No calf pain with compression  Left midfoot tender to palpation around area of infection but otherwise unremarkable musculoskeletal exam         Lab, Imaging and other studies:   Results from last 7 days   Lab Units 07/21/20  0807 07/15/20  2316 07/15/20  2314   GRAM STAIN RESULT  No polys seen*  Rare Gram positive cocci in pairs* Gram positive cocci in clusters* Gram positive cocci in clusters*       Results from last 7 days   Lab Units 07/21/20  1406 07/21/20  1351 07/18/20  0913   BLOOD CULTURE  Received in Microbiology Lab  Culture in Progress  Received in Microbiology Lab  Culture in Progress  No Growth at 72 hrs  Imaging: I have personally reviewed pertinent films in PACS          Portions of the record may have been created with voice recognition software  Occasional wrong word or "sound a like" substitutions may have occurred due to the inherent limitations of voice recognition software  Read the chart carefully and recognize, using context, where substitutions have occurred

## 2020-07-22 NOTE — PLAN OF CARE
Problem: PAIN - ADULT  Goal: Verbalizes/displays adequate comfort level or baseline comfort level  Description  Interventions:  - Encourage patient to monitor pain and request assistance  - Assess pain using appropriate pain scale  - Administer analgesics based on type and severity of pain and evaluate response  - Implement non-pharmacological measures as appropriate and evaluate response  - Consider cultural and social influences on pain and pain management  - Notify physician/advanced practitioner if interventions unsuccessful or patient reports new pain  Outcome: Progressing     Problem: INFECTION - ADULT  Goal: Absence or prevention of progression during hospitalization  Description  INTERVENTIONS:  - Assess and monitor for signs and symptoms of infection  - Monitor lab/diagnostic results  - Monitor all insertion sites, i e  indwelling lines, tubes, and drains  - Monitor endotracheal if appropriate and nasal secretions for changes in amount and color  - [place] appropriate cooling/warming therapies per order  - Administer medications as ordered  - Instruct and encourage patient and family to use good hand hygiene technique  - Identify and instruct in appropriate isolation precautions for identified infection/condition  Outcome: Progressing  Goal: Absence of fever/infection during neutropenic period  Description  INTERVENTIONS:  - Monitor WBC    Outcome: Progressing     Problem: SAFETY ADULT  Goal: Patient will remain free of falls  Description  INTERVENTIONS:  - Assess patient frequently for physical needs  -  Identify cognitive and physical deficits and behaviors that affect risk of falls    -  [place] fall precautions as indicated by assessment   - Educate patient/family on patient safety including physical limitations  - Instruct patient to call for assistance with activity based on assessment  - Modify environment to reduce risk of injury  - Consider OT/PT consult to assist with strengthening/mobility  Outcome: Progressing  Goal: Maintain or return to baseline ADL function  Description  INTERVENTIONS:  -  Assess patient's ability to carry out ADLs; assess patient's baseline for ADL function and identify physical deficits which impact ability to perform ADLs (bathing, care of mouth/teeth, toileting, grooming, dressing, etc )  - Assess/evaluate cause of self-care deficits   - Assess range of motion  - Assess patient's mobility; develop plan if impaired  - Assess patient's need for assistive devices and provide as appropriate  - Encourage maximum independence but intervene and supervise when necessary  - Involve family in performance of ADLs  - Assess for home care needs following discharge   - Consider OT consult to assist with ADL evaluation and planning for discharge  - Provide patient education as appropriate  Outcome: Progressing  Goal: Maintain or return mobility status to optimal level  Description  INTERVENTIONS:  - Assess patient's baseline mobility status (ambulation, transfers, stairs, etc )    - Identify cognitive and physical deficits and behaviors that affect mobility  - Identify mobility aids required to assist with transfers and/or ambulation (gait belt, sit-to-stand, lift, walker, cane, etc )  - [place] fall precautions as indicated by assessment  - Record patient progress and toleration of activity level on Mobility SBAR; progress patient to next Phase/Stage  - Instruct patient to call for assistance with activity based on assessment  - Consider rehabilitation consult to assist with strengthening/weightbearing, etc   Outcome: Progressing     Problem: DISCHARGE PLANNING  Goal: Discharge to home or other facility with appropriate resources  Description  INTERVENTIONS:  - Identify barriers to discharge w/patient and caregiver  - Arrange for needed discharge resources and transportation as appropriate  - Identify discharge learning needs (meds, wound care, etc )  - Arrange for interpretive services to assist at discharge as needed  - Refer to Case Management Department for coordinating discharge planning if the patient needs post-hospital services based on physician/advanced practitioner order or complex needs related to functional status, cognitive ability, or social support system  Outcome: Progressing     Problem: Knowledge Deficit  Goal: Patient/family/caregiver demonstrates understanding of disease process, treatment plan, medications, and discharge instructions  Description  Complete learning assessment and assess knowledge base  Interventions:  - Provide teaching at level of understanding  - Provide teaching via preferred learning methods  Outcome: Progressing     Problem: Potential for Falls  Goal: Patient will remain free of falls  Description  INTERVENTIONS:  - Assess patient frequently for physical needs  -  Identify cognitive and physical deficits and behaviors that affect risk of falls    -  [place] fall precautions as indicated by assessment   - Educate patient/family on patient safety including physical limitations  - Instruct patient to call for assistance with activity based on assessment  - Modify environment to reduce risk of injury  - Consider OT/PT consult to assist with strengthening/mobility  Outcome: Progressing

## 2020-07-22 NOTE — DISCHARGE SUMMARY
Discharge- Frankluke Quinwood 1990, 34 y o  female MRN: [id number]    Unit/Bed#: S -12 Encounter: [id number]    Primary Care Provider: Joana Bingham PA-C   Date and time admitted to hospital: 7/15/2020  8:41 AM        Bacteremia due to Staphylococcus aureus  Assessment & Plan    · Pt  Is IV drug abuser, could be source of her bacteremia  · Blood Cx on 7/15- Staph aureus, Blood Cx on 7/18- no growth at 48 hrs, another set of blood Cx drawn today  ·  Will Monitor Blood CX  · Ancef per infectious disease  TTE- There is an approximately 1cm atrial aspect mass on the free wall leaflet of tricuspid valve  There appears to be highly eccentric tricuspid regurgitation and may represent leaflet perforation  There was moderate regurgitation  MELISSA- prelim report on 07/21/2020- Normal cardiac function  Mobile mass seen on tricuspid valve suggestive of vegetation  Severe tricuspid regurgitation  · Will F/U with ID for ABX course for Infective Endocarditis  · ID also recommends- MRI T spine, will follow-up imaging          Loculated pleural effusion  Assessment & Plan  · CT on admission:  Partial evaluation of a loculated right basilar pleural effusion with right middle lobe opacity likely reflecting atelectasis with infiltrate not excluded  · Source Could be likely from her Septic embolus   · Pt  Has tenderness and is still not able to take deep inspiration,also complains of back pain  Will consider a trial dose of Robaxan 800 mg, likely that she has some spasmic component  ·  Given the small size of the effusion, IR does not recommend thoracentesis at this time   · Will monitor for pain and any signs of sepsis    Heroin abuse ([facility] [address] )  Assessment & Plan  · Patient's history of heroin abuse - clean x 1 year then used 1 week ago (IV heroin), as a compensatory mechanism to deal with emotions  · No signs of withdrawal at this time  · Limit opioids  Did receive Dilaudid in the emergency department  · Started on Percocet with significant improvement in her pain  Continue on Toradol on low-dose Percocet as needed  · Patient aware of no escalation in pain management  · Prior endocarditis 2018  · Patient is emotional about her substance abuse and wants to quit  Consulted psychiatry for substance abuse and underlying anxiety and depression    Abscess of left foot  Assessment & Plan  · Probable etiology- septic embolus or the site of injection  · Podiatry drained the wound bedside  Still about 1cm of subdermal swelling with fluctuation, Probable surgical incision and drainage tomorrow  · Will follow-up on wound cultures      Insomnia  Assessment & Plan  Patient stated that she had been taking Remeron 30 mg every day  She has been unable to sleep without medication  started Remeron 30 mg bedtime yesterday, slept through out the night  Acute pyelonephritis  Assessment & Plan  · CT: Somewhat streaky opacification of the renal cortex bilaterally consistent with pyelonephritis, uncomplicated  · Urinalysis fairly unremarkable with no evidence of nitrates, 4-10 WBCs and occasional bacteria    · Urine culture- 40,000- 49,000 cfu/ml  · Patient tachycardic and tachypneic on admission with significant CVA tenderness- likely from her right loculated pleural effusion  · Continue antibiotics per Infectious Disease  · Urine culture mixed contaminant not completely clear this is actual pyelonephritis   · patient  has clinically improved            Discharging Resident Physician: Venkatesh Fisher MD  Attending: No att  providers found  PCP: Princess Cassandra PA-C  Admission Date: 7/15/2020  Discharge Date: 07/22/20    Disposition:     Other: Patient left the hospital against medical advice    Reason for Admission: Sepsis with staph bacteremia    Consultations During Hospital Stay:  · ID  · Cardiology  · IR    Procedures Performed:     · MELISSA  · TTE    Significant Findings / Test Results:   MELISSA-   TRICUSPID VALVE:  There was moderate to severe regurgitation  There was a medium-sized, papillary, mobile vegetation, measuring 11 mm x 10 mm on the tip of the anterior leaflet, on the right atrial aspect  Incidental Findings:    CTA- chest Scattered nodules are seen some of which are more peripherally based and suggests the possibility of multifocal pneumonia or perhaps septic emboli  Metastatic disease is possible although less likely given the clinical circumstances  There is mediastinal adenopathy and splenomegaly  Neoplastic possibilities including lymphoma could also be considered  Atypical infection such as mycobacterial or fungal and be associated adenopathy and multifocal infiltrates      Loculated right pleural effusion  Test Results Pending at Discharge (will require follow up):   · MRI thoracic and lumbar spine to R/O spinal abscess or vertebral osteomyelitis- Patient eloped from the hospital before finishing the test  ·      Outpatient Tests Requested:  · None    Complications:  None    Hospital Course:     Eduar Johnson is a 34 y o  female patient who originally presented to the hospital on 7/15/2020 due to shortness of breath and right upper back pain for 3 days  noted to have CVA tenderness, tachycardia and tachypnea  She was noted to undergo CT of the abdomen and pelvis which showed bilateral possible pyelonephritis in the patient was also noted to have a sodium of 125  She was subsequently prompted for admission  The patient was given 1 g of IV ceftriaxone, normal saline solution, Toradol and Dilaudid  past medical history of hepatitis-C, heroin abuse    07/16/2020- CT chest shows scattered nodules bilaterally, more peripheral   Loculated right pleural effusion  Blood cultures are still pending  Will add empiric vancomycin for now pending blood culture results  07/17/2020- Blood cultures showed Staph aureus  Concern for endocarditis in the setting of active IVDU  Patient refused 2D echo   Reinforced to patient that need an echocardiogram   Pt  agreeable to 2D echo  May warrant MELISSA  continued IV vancomycin and add IV cefazolin   07/18/2020-- IV vancyomycin and IV cefazolin, Day 3   07/19/2020-  IV vancyomycin and IV cefazolin, Day 4  07/20/2020- TTE- noted to have a 1 cm mass on the atrial aspect of the free wall leaflet of the tricuspid valve with moderate tricuspid regurg  Patient will need a MELISSA, added Remeron, consulted Podiatry for abscess on the foot, cardiology for tte, IR for drainage of loculated effusions, remained afebrile  07/21/2020-  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  There was no evidence of significant cardiac dysfunction or valvular damage  In addition, patient underwent bedside I and D of her left foot and ankle wound yesterday  Repeat blood cultures so far without no growth  Patient will require 6 weeks of IV antibiotic therapy  Appreciate behavioral health consultation and assistance  Patient is planned for MRI of her T and L-spine  Patient was claustrophobic and anxious and refused for MRI testing  07/22/2020- Patient eloped from the hospital without notifying  Took off the peripheral IV line  Condition at Discharge: serious- Patient needs IV antibiotics for 6 weeks to treat her Infective endocarditis     Discharge Day Visit / Exam:     Subjective:  Patient was complaining of 10/10 back pain this morning, remained afebrile  Vitals: Blood Pressure: 128/80 (07/22/20 0710)  Pulse: 72 (07/22/20 0710)  Temperature: 98 8 °F (37 1 °C) (07/22/20 0710)  Temp Source: Oral (07/22/20 0710)  Respirations: 16 (07/22/20 0710)  Height: 5' 5" (165 1 cm) (07/15/20 1427)  Weight - Scale: 72 1 kg (158 lb 15 2 oz) (07/15/20 1427)  SpO2: 97 % (07/22/20 0710)  Exam:   Physical Exam   Constitutional: She is oriented to person, place, and time  She appears well-developed and well-nourished  HENT:   Head: Normocephalic and atraumatic  Neck: Normal range of motion   Neck supple  Cardiovascular: Normal rate and regular rhythm  Murmur heard  Pulmonary/Chest: Effort normal    Decreased breath sounds on the right side   Abdominal: Soft  Bowel sounds are normal    Musculoskeletal: Normal range of motion  Feet:   Left Foot:   Skin Integrity: Positive for erythema and warmth  Neurological: She is alert and oriented to person, place, and time  Skin: Skin is warm and dry  Discussion with Family: Did not call family, through out the hospital stay, patient refused offers to call family, she said she will update her mother by herself  Discharge instructions/Information to patient and family:   See after visit summary for information provided to patient and family  Provisions for Follow-Up Care:  See after visit summary for information related to follow-up care and any pertinent home health orders  Planned Readmission: None     Discharge Medications:  See after visit summary for reconciled discharge medications provided to patient and family        ** Please Note: This note has been constructed using a voice recognition system **

## 2020-07-22 NOTE — QUICK NOTE
Patient was looking forward to see her boyfriend, has been talking about it since Monday  He was going to visit her today  This morning received a text from the nurse that patient wanted to leave against medical advice as her boyfriend is "Stranded"   Before I talked to her about the risks of leaving against medical advice to the patient, she left the hospital  Took her IV line out as she left the hospital  Notified the attending

## 2020-07-22 NOTE — ASSESSMENT & PLAN NOTE
· Probable etiology- septic embolus or the site of injection  · Podiatry drained the wound bedside   Still about 1cm of subdermal swelling with fluctuation, Probable surgical incision and drainage tomorrow  · Will follow-up on wound cultures

## 2020-07-23 LAB
BACTERIA BLD CULT: NORMAL
BACTERIA BLD CULT: NORMAL
BACTERIA WND AEROBE CULT: ABNORMAL
GRAM STN SPEC: ABNORMAL
GRAM STN SPEC: ABNORMAL

## 2020-07-26 LAB
BACTERIA BLD CULT: NORMAL
BACTERIA BLD CULT: NORMAL

## 2020-08-03 NOTE — UTILIZATION REVIEW
Notification of Discharge  This is a Notification of Discharge from our facility [address]  Please be advised that this patient has been discharge from our facility  Below you will find the admission and discharge date and time including the patients disposition  PRESENTATION DATE: 7/15/2020  8:41 AM  OBS ADMISSION DATE:   IP ADMISSION DATE: 7/15/20 1243   DISCHARGE DATE: 7/22/2020  9:32 AM  DISPOSITION: Left against medical advice or discontinued care Left against medical advice or discontinued care   Admission Orders listed below:  Admission Orders (From admission, onward)     Ordered        07/15/20 1244  Inpatient Admission  Once                   Please contact the UR Department if additional information is required to close this patient's authorization/case  [address] [facility] Utilization Review Department  Main: [phone] x carefully listen to the prompts  All voicemails are confidential   [email or web address]  Send all requests for admission clinical reviews, approved or denied determinations and any other requests to dedicated fax number below belonging to the campus where the patient is receiving treatment   List of dedicated fax numbers:  [address] DENIALS (Administrative/Medical Necessity) [phone]   [address] (Maternity/NICU/Pediatrics) [phone]   Conchita Summers [phone]   Sahu Delay [phone]   Brenda Sitter [phone]   [address] [phone]   [facility]  [phone]   [address] [facility]  [address] [address] [address] [facility] [phone]

## 2020-08-10 ENCOUNTER — APPOINTMENT (EMERGENCY)
Dept: RADIOLOGY | Facility: HOSPITAL | Age: 30
End: 2020-08-10
Payer: COMMERCIAL

## 2020-08-10 ENCOUNTER — HOSPITAL ENCOUNTER (EMERGENCY)
Facility: HOSPITAL | Age: 30
End: 2020-08-10
Payer: COMMERCIAL

## 2020-08-10 ENCOUNTER — HOSPITAL ENCOUNTER (INPATIENT)
Facility: HOSPITAL | Age: 30
LOS: 2 days | DRG: 193 | End: 2020-08-12
Attending: INTERNAL MEDICINE | Admitting: INTERNAL MEDICINE
Payer: COMMERCIAL

## 2020-08-10 VITALS
RESPIRATION RATE: 18 BRPM | HEIGHT: 65 IN | WEIGHT: 140 LBS | TEMPERATURE: 98.5 F | SYSTOLIC BLOOD PRESSURE: 122 MMHG | BODY MASS INDEX: 23.32 KG/M2 | HEART RATE: 87 BPM | DIASTOLIC BLOOD PRESSURE: 79 MMHG | OXYGEN SATURATION: 98 %

## 2020-08-10 DIAGNOSIS — B95.61 BACTEREMIA DUE TO STAPHYLOCOCCUS AUREUS: ICD-10-CM

## 2020-08-10 DIAGNOSIS — I33.0 ACUTE BACTERIAL ENDOCARDITIS: Primary | ICD-10-CM

## 2020-08-10 DIAGNOSIS — F11.10 HEROIN ABUSE (HCC): Primary | ICD-10-CM

## 2020-08-10 DIAGNOSIS — R78.81 BACTEREMIA DUE TO STAPHYLOCOCCUS AUREUS: ICD-10-CM

## 2020-08-10 DIAGNOSIS — F31.9 BIPOLAR 1 DISORDER (HCC): ICD-10-CM

## 2020-08-10 PROBLEM — E87.6 HYPOKALEMIA: Status: ACTIVE | Noted: 2020-08-10

## 2020-08-10 PROBLEM — I76 SEPTIC EMBOLISM (HCC): Status: ACTIVE | Noted: 2020-08-10

## 2020-08-10 PROBLEM — I38 ENDOCARDITIS: Status: ACTIVE | Noted: 2020-08-10

## 2020-08-10 LAB
ALBUMIN SERPL BCP-MCNC: 3 G/DL (ref 3.4–4.8)
ALP SERPL-CCNC: 130 U/L (ref 35–140)
ALT SERPL W P-5'-P-CCNC: 59 U/L (ref 5–54)
ANION GAP SERPL CALCULATED.3IONS-SCNC: 12 MMOL/L (ref 4–13)
APTT PPP: 31 SECONDS (ref 23–31)
AST SERPL W P-5'-P-CCNC: 134 U/L (ref 15–41)
BASOPHILS # BLD AUTO: 0.01 THOUSANDS/ΜL (ref 0–0.1)
BASOPHILS NFR BLD AUTO: 0 % (ref 0–1)
BILIRUB SERPL-MCNC: 0.82 MG/DL (ref 0.3–1.2)
BUN SERPL-MCNC: 15 MG/DL (ref 6–20)
CALCIUM SERPL-MCNC: 8.6 MG/DL (ref 8.4–10.2)
CHLORIDE SERPL-SCNC: 96 MMOL/L (ref 96–108)
CO2 SERPL-SCNC: 26 MMOL/L (ref 22–33)
CREAT SERPL-MCNC: 0.61 MG/DL (ref 0.4–1.1)
CRP SERPL QL: 32.9 MG/L (ref 0–1)
EOSINOPHIL # BLD AUTO: 0.02 THOUSAND/ΜL (ref 0–0.61)
EOSINOPHIL NFR BLD AUTO: 0 % (ref 0–6)
ERYTHROCYTE [DISTWIDTH] IN BLOOD BY AUTOMATED COUNT: 14.8 % (ref 11.6–15.1)
GFR SERPL CREATININE-BSD FRML MDRD: 125 ML/MIN/1.73SQ M
GLUCOSE SERPL-MCNC: 101 MG/DL (ref 65–140)
HCT VFR BLD AUTO: 28.2 % (ref 34.8–46.1)
HGB BLD-MCNC: 9.2 G/DL (ref 11.5–15.4)
IMM GRANULOCYTES # BLD AUTO: 0.07 THOUSAND/UL (ref 0–0.2)
IMM GRANULOCYTES NFR BLD AUTO: 1 % (ref 0–2)
INR PPP: 1.2 (ref 0.9–1.1)
LYMPHOCYTES # BLD AUTO: 0.49 THOUSANDS/ΜL (ref 0.6–4.47)
LYMPHOCYTES NFR BLD AUTO: 8 % (ref 14–44)
MCH RBC QN AUTO: 26.6 PG (ref 26.8–34.3)
MCHC RBC AUTO-ENTMCNC: 32.6 G/DL (ref 31.4–37.4)
MCV RBC AUTO: 82 FL (ref 82–98)
MONOCYTES # BLD AUTO: 0.25 THOUSAND/ΜL (ref 0.17–1.22)
MONOCYTES NFR BLD AUTO: 4 % (ref 4–12)
NEUTROPHILS # BLD AUTO: 4.98 THOUSANDS/ΜL (ref 1.85–7.62)
NEUTS SEG NFR BLD AUTO: 87 % (ref 43–75)
PLATELET # BLD AUTO: 322 THOUSANDS/UL (ref 149–390)
PMV BLD AUTO: 9.6 FL (ref 8.9–12.7)
POTASSIUM SERPL-SCNC: 2.9 MMOL/L (ref 3.5–5)
PROT SERPL-MCNC: 6.9 G/DL (ref 6.4–8.3)
PROTHROMBIN TIME: 12.6 SECONDS (ref 9.5–12.1)
RBC # BLD AUTO: 3.46 MILLION/UL (ref 3.81–5.12)
SODIUM SERPL-SCNC: 134 MMOL/L (ref 133–145)
WBC # BLD AUTO: 5.82 THOUSAND/UL (ref 4.31–10.16)

## 2020-08-10 PROCEDURE — 87186 SC STD MICRODIL/AGAR DIL: CPT

## 2020-08-10 PROCEDURE — 36415 COLL VENOUS BLD VENIPUNCTURE: CPT

## 2020-08-10 PROCEDURE — 85730 THROMBOPLASTIN TIME PARTIAL: CPT

## 2020-08-10 PROCEDURE — 80053 COMPREHEN METABOLIC PANEL: CPT

## 2020-08-10 PROCEDURE — 85025 COMPLETE CBC W/AUTO DIFF WBC: CPT

## 2020-08-10 PROCEDURE — 96361 HYDRATE IV INFUSION ADD-ON: CPT

## 2020-08-10 PROCEDURE — 71045 X-RAY EXAM CHEST 1 VIEW: CPT

## 2020-08-10 PROCEDURE — 99223 1ST HOSP IP/OBS HIGH 75: CPT | Performed by: INTERNAL MEDICINE

## 2020-08-10 PROCEDURE — 99255 IP/OBS CONSLTJ NEW/EST HI 80: CPT | Performed by: INTERNAL MEDICINE

## 2020-08-10 PROCEDURE — 99285 EMERGENCY DEPT VISIT HI MDM: CPT

## 2020-08-10 PROCEDURE — 96372 THER/PROPH/DIAG INJ SC/IM: CPT

## 2020-08-10 PROCEDURE — 96376 TX/PRO/DX INJ SAME DRUG ADON: CPT

## 2020-08-10 PROCEDURE — NC001 PR NO CHARGE: Performed by: PSYCHIATRY & NEUROLOGY

## 2020-08-10 PROCEDURE — 85610 PROTHROMBIN TIME: CPT

## 2020-08-10 PROCEDURE — 87040 BLOOD CULTURE FOR BACTERIA: CPT

## 2020-08-10 PROCEDURE — 96374 THER/PROPH/DIAG INJ IV PUSH: CPT

## 2020-08-10 PROCEDURE — 86140 C-REACTIVE PROTEIN: CPT

## 2020-08-10 PROCEDURE — 87147 CULTURE TYPE IMMUNOLOGIC: CPT

## 2020-08-10 PROCEDURE — 96375 TX/PRO/DX INJ NEW DRUG ADDON: CPT

## 2020-08-10 RX ORDER — HYDROMORPHONE HCL/PF 1 MG/ML
1 SYRINGE (ML) INJECTION ONCE
Status: COMPLETED | OUTPATIENT
Start: 2020-08-10 | End: 2020-08-10

## 2020-08-10 RX ORDER — OXYCODONE HYDROCHLORIDE AND ACETAMINOPHEN 5; 325 MG/1; MG/1
1 TABLET ORAL EVERY 4 HOURS PRN
Status: DISCONTINUED | OUTPATIENT
Start: 2020-08-10 | End: 2020-08-10

## 2020-08-10 RX ORDER — LORAZEPAM 2 MG/ML
1 INJECTION INTRAMUSCULAR ONCE
Status: COMPLETED | OUTPATIENT
Start: 2020-08-10 | End: 2020-08-10

## 2020-08-10 RX ORDER — ONDANSETRON 2 MG/ML
4 INJECTION INTRAMUSCULAR; INTRAVENOUS EVERY 6 HOURS PRN
Status: DISCONTINUED | OUTPATIENT
Start: 2020-08-10 | End: 2020-08-12 | Stop reason: HOSPADM

## 2020-08-10 RX ORDER — POTASSIUM CHLORIDE 20 MEQ/1
40 TABLET, EXTENDED RELEASE ORAL
Status: DISPENSED | OUTPATIENT
Start: 2020-08-10 | End: 2020-08-11

## 2020-08-10 RX ORDER — SODIUM CHLORIDE 9 MG/ML
500 INJECTION, SOLUTION INTRAVENOUS ONCE
Status: COMPLETED | OUTPATIENT
Start: 2020-08-10 | End: 2020-08-10

## 2020-08-10 RX ORDER — MIRTAZAPINE 15 MG/1
30 TABLET, FILM COATED ORAL
Status: DISCONTINUED | OUTPATIENT
Start: 2020-08-10 | End: 2020-08-12 | Stop reason: HOSPADM

## 2020-08-10 RX ORDER — KETOROLAC TROMETHAMINE 30 MG/ML
15 INJECTION, SOLUTION INTRAMUSCULAR; INTRAVENOUS EVERY 6 HOURS PRN
Status: DISCONTINUED | OUTPATIENT
Start: 2020-08-10 | End: 2020-08-11

## 2020-08-10 RX ORDER — OXYCODONE HYDROCHLORIDE AND ACETAMINOPHEN 5; 325 MG/1; MG/1
1 TABLET ORAL EVERY 4 HOURS PRN
Status: DISCONTINUED | OUTPATIENT
Start: 2020-08-10 | End: 2020-08-11

## 2020-08-10 RX ORDER — DOCUSATE SODIUM 100 MG/1
100 CAPSULE, LIQUID FILLED ORAL 2 TIMES DAILY PRN
Status: DISCONTINUED | OUTPATIENT
Start: 2020-08-10 | End: 2020-08-12 | Stop reason: HOSPADM

## 2020-08-10 RX ORDER — ONDANSETRON 2 MG/ML
4 INJECTION INTRAMUSCULAR; INTRAVENOUS ONCE
Status: COMPLETED | OUTPATIENT
Start: 2020-08-10 | End: 2020-08-10

## 2020-08-10 RX ORDER — HYDROMORPHONE HCL 110MG/55ML
2 PATIENT CONTROLLED ANALGESIA SYRINGE INTRAVENOUS ONCE
Status: COMPLETED | OUTPATIENT
Start: 2020-08-10 | End: 2020-08-10

## 2020-08-10 RX ORDER — CALCIUM CARBONATE 200(500)MG
1000 TABLET,CHEWABLE ORAL DAILY PRN
Status: DISCONTINUED | OUTPATIENT
Start: 2020-08-10 | End: 2020-08-12 | Stop reason: HOSPADM

## 2020-08-10 RX ORDER — CEFAZOLIN SODIUM 2 G/50ML
2000 SOLUTION INTRAVENOUS EVERY 8 HOURS
Status: DISCONTINUED | OUTPATIENT
Start: 2020-08-10 | End: 2020-08-12 | Stop reason: HOSPADM

## 2020-08-10 RX ADMIN — CEFAZOLIN SODIUM 2000 MG: 2 SOLUTION INTRAVENOUS at 20:43

## 2020-08-10 RX ADMIN — MIRTAZAPINE 30 MG: 15 TABLET, FILM COATED ORAL at 21:11

## 2020-08-10 RX ADMIN — HYDROMORPHONE HYDROCHLORIDE 1 MG: 1 INJECTION, SOLUTION INTRAMUSCULAR; INTRAVENOUS; SUBCUTANEOUS at 09:30

## 2020-08-10 RX ADMIN — ONDANSETRON 4 MG: 2 INJECTION INTRAMUSCULAR; INTRAVENOUS at 06:42

## 2020-08-10 RX ADMIN — LORAZEPAM 1 MG: 2 INJECTION INTRAMUSCULAR; INTRAVENOUS at 07:40

## 2020-08-10 RX ADMIN — SODIUM CHLORIDE 500 ML/HR: 0.9 INJECTION, SOLUTION INTRAVENOUS at 06:48

## 2020-08-10 RX ADMIN — OXYCODONE HYDROCHLORIDE AND ACETAMINOPHEN 1 TABLET: 5; 325 TABLET ORAL at 14:24

## 2020-08-10 RX ADMIN — HYDROMORPHONE HYDROCHLORIDE 1 MG: 1 INJECTION, SOLUTION INTRAMUSCULAR; INTRAVENOUS; SUBCUTANEOUS at 06:52

## 2020-08-10 RX ADMIN — HYDROMORPHONE HYDROCHLORIDE 1 MG: 1 INJECTION, SOLUTION INTRAMUSCULAR; INTRAVENOUS; SUBCUTANEOUS at 06:42

## 2020-08-10 RX ADMIN — CEFAZOLIN SODIUM 2000 MG: 2 SOLUTION INTRAVENOUS at 14:06

## 2020-08-10 RX ADMIN — KETOROLAC TROMETHAMINE 15 MG: 30 INJECTION, SOLUTION INTRAMUSCULAR at 12:02

## 2020-08-10 RX ADMIN — HYDROMORPHONE HYDROCHLORIDE 2 MG: 2 INJECTION, SOLUTION INTRAMUSCULAR; INTRAVENOUS; SUBCUTANEOUS at 06:07

## 2020-08-10 RX ADMIN — OXYCODONE HYDROCHLORIDE AND ACETAMINOPHEN 1 TABLET: 5; 325 TABLET ORAL at 18:51

## 2020-08-10 RX ADMIN — KETOROLAC TROMETHAMINE 15 MG: 30 INJECTION, SOLUTION INTRAMUSCULAR at 20:43

## 2020-08-10 NOTE — ED NOTES
Receiving nurse did not call back to receive report on patient prior to patient arrival  [facility] ambulance here to transport patient to [address] now  VS stable, patient stable for transport       Norma Yang RN  08/10/20 5511

## 2020-08-10 NOTE — ASSESSMENT & PLAN NOTE
· Patient with history of MSSA bacteremia, left against medical advice from [facility] and then [facility] on 07/22/2020, then got admitted at [facility] on 07/30/2020 , left AMA on 08/04/2020  Was not prescribed any p o  Antibiotics at the time of discharge as she wishes to get admitted at [facility]  Today she presented at [facility] Emergency Room due to pain  · Will continue high-dose IV cefazolin  · Consult infectious disease team  · Follow repeat blood cultures  · Patient had MRI thoracolumbar spine 08/01/2020 and there was no evidence of any abscess/septic emboli  · CT chest abdomen and pelvis 08/01/2020: Impression:   1  Moderate degree of multifocal nodular airspace consolidations bilaterally  with peripheral predominance compatible with septic emboli  2  Small right pleural fluid including thin loculated pleural effusion  laterally  3  Bilateral renal parenchymal abnormalities favored to represent bilateral  pyelonephritis  No abscess  4  Splenomegaly

## 2020-08-10 NOTE — CONSULTS
Consultation - Infectious Disease   Rosario Lopez 34 y o  female MRN: [id number]  Unit/Bed#: S -01 Encounter: [id number]      IMPRESSION & RECOMMENDATIONS:   Impression/Recommendations:  1  Prolonged MSSA bacteremia with TV endocarditis  Blood cultures from 07/15/2020 were positive  Prolonged course of IV antibiotic recommended, but patient left AMA  Again found to have MSSA bacteremia at [facility] on 07/30/2020, but patient again left AMA  I am obviously concerned for persistent bacteremia, as patient has not received adequate antibiotic course  Fortunately, she remains afebrile and clinically stable     -continue IV cefazolin 2 g Q 8 hours  -follow up pending blood cultures  -monitor temperatures and hemodynamics  -monitor CBC    2  Tricuspid valve endocarditis, 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  Recent MELISSA showed large vegetation on TV with severe regurgitation  Recent CT abdomen/pelvis also showed bilateral renal parenchymal abnormalities concerning for septic emboli  No intra-abdominal abscess  Respiratory status remains stable with no hypoxia     -antibiotic plan as above  -obtain CT surgery evaluation  -may warrant IR evaluation for thoracentesis    3  Recent left foot cellulitis with abscess  Likely site of injection of IV drugs versus ectopic foci in the setting of MSSA bacteremia  This has appeared to heal with no clinical evidence of active infection     -antibiotic plan as above  -serial foot exams    4  Back pain  More likely secondary to chronic pain, opioid dependence  Consider infection in the setting of MSSA bacteremia  Thoracic and lumbar spine MRIs performed at [facility] were negative     -monitor back pain  -serial exams    5  Active IVDU  Risk factor for development of bacteremia  Unfortunately, patient continues to leave AMA  She is high risk for development of withdrawal and again leaving AMA      -consider evaluation by acute pain service  -patient is not a candidate for at home PICC line/IV antibiotics    6  Prior MSSA bacteremia/possible endocarditis in 2018  Patient had 1 of 2 blood cultures positive for MSSA on 11/25/2018  MELISSA at the time showed questionable density  Repeat MELISSA showed no obvious endocarditis  7  Chronic HCV infection  Recent HIV was negative  Antibiotics:  Cefazolin    I discussed above plan with Dr Micah Barr from Internal Medicine Service  I personally reviewed prior hospitalization records  Thank you for this consultation  We will follow along with you  HISTORY OF PRESENT ILLNESS:  Reason for Consult:  Recurrent MSSA bacteremia  HPI: Narendra Genao is a 34 y o  female with active IVDU, chronic HCV, prior MSSA bacteremia/possible endocarditis in 2018, recent admission in mid July 2020 at [facility] secondary to MSSA bacteremia with probable septic pulmonary emboli and loculated right pleural effusion  Patient developed left foot cellulitis with abscess and was supposed to go for operative debridement, but patient left AMA on 07/22/2020  She did undergo MELISSA which showed a large tricuspid valve vegetation with severe tricuspid regurgitation  She was subsequently hospitalized at [facility] from 07/30 to 8/4 again for MSSA bacteremia  CT chest/abdomen/pelvis revealed moderate consolidations bilaterally concerning for septic emboli again with loculated right effusion, and bilateral renal parenchymal abnormalities  MRI thoracic and lumbar spine was negative  Patient was supposed to get a MELISSA, but unfortunately left against medical advice  She now most recently presents today to [facility] ER with complaint of pain everywhere, including her lower back worse in her right flank  She is very agitated and a fairly poor historian  Reportedly last used heroin about 2 days ago  Of note, her left ankle abscess self drained since discharge and has improved      REVIEW OF SYSTEMS:  A complete system-based review of systems is otherwise negative  PAST MEDICAL HISTORY:  Past Medical History:   Diagnosis Date    Abnormal Pap smear of cervix     Anxiety     Depression     Endocarditis     2018    Hepatitis C     HPV (human papilloma virus) anogenital infection      Past Surgical History:   Procedure Laterality Date    KNEE SURGERY Left     MOUTH SURGERY         FAMILY HISTORY:  Non-contributory    SOCIAL HISTORY:  Social History     Substance and Sexual Activity   Alcohol Use No     Social History     Substance and Sexual Activity   Drug Use Yes    Types: Heroin    Comment: last use - one week ago     Social History     Tobacco Use   Smoking Status Former Smoker    Packs/day: 0 25    Types: Cigarettes    Last attempt to quit: 2016    Years since quitting: 3 7   Smokeless Tobacco Never Used   Tobacco Comment    current everyday smoker per [employer] Hinojosa       ALLERGIES:  Allergies   Allergen Reactions    Latex     Pollen Extract        MEDICATIONS:  All current active medications have been reviewed  PHYSICAL EXAM:  Vitals:  Temp:  [98 5 °F (36 9 °C)] 98 5 °F (36 9 °C)  HR:  [78-87] 87  Resp:  [16-18] 18  BP: (115-127)/(65-79) 122/79  SpO2:  [96 %-98 %] 98 %  Temp (24hrs), Av 5 °F (36 9 °C), Min:98 5 °F (36 9 °C), Max:98 5 °F (36 9 °C)  Current:       Physical Exam:  General:  Sleepy but arousable  Eyes:  Conjunctive clear with no hemorrhages or effusions  Oropharynx:  No ulcers, no lesions  Neck:  Supple, no lymphadenopathy  Lungs:  Nonlabored respirations  Cardiac:  Regular rate and rhythm, positive murmur  Abdomen:  Soft, non-tender, non-distended  Extremities:  No peripheral cyanosis, clubbing, or edema  Skin:  Multiple small scabs on both of her feet  No obvious evidence of cellulitis or open draining wounds  No joint swelling    Neurological:  Moves all four extremities spontaneously      LABS, IMAGING, & OTHER STUDIES:  Lab Results:  I have personally reviewed pertinent labs  Results from last 7 days   Lab Units 08/10/20  0638   POTASSIUM mmol/L 2 9*   CHLORIDE mmol/L 96   CO2 mmol/L 26   BUN mg/dL 15   CREATININE mg/dL 0 61   EGFR ml/min/1 73sq m 125   CALCIUM mg/dL 8 6   AST U/L 134*   ALT U/L 59*   ALK PHOS U/L 130 0     Results from last 7 days   Lab Units 08/10/20  0639   WBC Thousand/uL 5 82   HEMOGLOBIN g/dL 9 2*   PLATELETS Thousands/uL 322         Imaging Studies:   I have personally reviewed pertinent imaging study reports and images in PACS  Chest x-ray shows no acute cardiopulmonary disease  Persistent right lower lobe opacity which may be loculated pleural effusion  EKG, Pathology, and Other Studies:   I have personally reviewed pertinent reports  Recent MELISSA from 07/21/2020 showed moderate to severe tricuspid regurgitation with medium sized mobile vegetation measuring 11 mm x 10 mm  No vegetations on aortic or mitral valve

## 2020-08-10 NOTE — ED PROVIDER NOTES
History  Chief Complaint   Patient presents with    Back Pain     Patient here with c/o lower back pain mostlyu on the right side radiating down into buttocks area  Symptoms for a weeks now  Patient takes OTC Ibuprofen with no relief  A 68-year-old female history you for IV drug abuse hepatitis-C opiate dependence recent diagnosis of endocarditis the echo recent signing out against medical advice 10 days ago from [facility] prior to completion of her treatment for endocarditis  Patient is here secondary to increasing back pain lower back area patient states she can not find a comfortable position lying or sitting and she is having difficulty walking secondary to pain in her back and weakness in her legs  Patient denies any shortness of breath  Patient states she has been having intermittent chest pain she did have an episode of vomiting earlier today  Patient arrives afebrile with normal vital signs here  Review of records does demonstrate patient was admitted to [facility] in [facility] where she received IV antibiotics for left against medical advice and has not followed up with anybody since that time  She was diagnosed with endocarditis approximately 1 week prior to that admission at [facility]  Patient states she has not used IV drugs in approximately 4 days  None       Past Medical History:   Diagnosis Date    Abnormal Pap smear of cervix     Anxiety     Depression     Endocarditis     2018    Hepatitis C     HPV (human papilloma virus) anogenital infection        Past Surgical History:   Procedure Laterality Date    KNEE SURGERY Left     MOUTH SURGERY         History reviewed  No pertinent family history  I have reviewed and agree with the history as documented      E-Cigarette/Vaping     E-Cigarette/Vaping Substances     Social History     Tobacco Use    Smoking status: Former Smoker     Packs/day: 0 25     Types: Cigarettes     Last attempt to quit: 11/9/2016     Years since quitting: 3 7    Smokeless tobacco: Never Used    Tobacco comment: current everyday smoker per [employer]   Substance Use Topics    Alcohol use: No    Drug use: Yes     Types: Heroin     Comment: last use - one week ago       Review of Systems   Constitutional: Negative for chills and fever  HENT: Negative for congestion  Eyes: Negative for visual disturbance  Respiratory: Negative for shortness of breath  Cardiovascular: Negative for chest pain  Gastrointestinal: Positive for nausea and vomiting  Negative for abdominal pain  Endocrine: Negative for cold intolerance  Genitourinary: Negative for frequency  Musculoskeletal: Positive for back pain, gait problem and myalgias  Skin: Negative for rash  Neurological: Negative for dizziness  Psychiatric/Behavioral: Negative for behavioral problems and confusion  Physical Exam  Physical Exam  Vitals signs and nursing note reviewed  Constitutional:       General: She is in acute distress (due to back pain)  Appearance: She is well-developed  HENT:      Head: Normocephalic and atraumatic  Eyes:      Conjunctiva/sclera: Conjunctivae normal       Pupils: Pupils are equal, round, and reactive to light  Neck:      Musculoskeletal: Normal range of motion and neck supple  Cardiovascular:      Rate and Rhythm: Normal rate and regular rhythm  Heart sounds: Normal heart sounds  Comments: Patient is a 3/6 systolic murmur noted at the left sternal border  Pulmonary:      Effort: Pulmonary effort is normal       Breath sounds: Normal breath sounds  Abdominal:      General: Bowel sounds are normal       Palpations: Abdomen is soft  Musculoskeletal:      Comments: Paraspinous tenderness lower back  Decreased range of motion secondary to pain  Patient with tenderness to palpation in posterior back buttock region  Skin:     General: Skin is warm and dry        Capillary Refill: Capillary refill takes less than 2 seconds  Neurological:      Mental Status: She is alert and oriented to person, place, and time  Psychiatric:         Behavior: Behavior normal          Vital Signs  ED Triage Vitals [08/10/20 0413]   Temperature Pulse Respirations Blood Pressure SpO2   98 5 °F (36 9 °C) 78 16 118/65 96 %      Temp Source Heart Rate Source Patient Position - Orthostatic VS BP Location FiO2 (%)   Oral Monitor Lying Left arm --      Pain Score       Worst Possible Pain           Vitals:    08/10/20 0413   BP: 118/65   Pulse: 78   Patient Position - Orthostatic VS: Lying         Visual Acuity      ED Medications  Medications   HYDROmorphone (DILAUDID) injection 1 mg (has no administration in time range)   ondansetron (ZOFRAN) injection 4 mg (has no administration in time range)   sodium chloride 0 9 % infusion (has no administration in time range)   HYDROmorphone (DILAUDID) injection 2 mg (2 mg Intramuscular Given 8/10/20 0607)       Diagnostic Studies  Results Reviewed     Procedure Component Value Units Date/Time    CBC and differential [[id number]] Updated:  08/10/20 0619    Lab Status:  No result Specimen:  Blood from Arm, Right     Blood culture #1 [[id number]] Collected:  08/10/20 0546    Lab Status:   In process Specimen:  Blood from Arm, Right Updated:  08/10/20 0602    Sedimentation rate, automated [[id number]] Collected:  08/10/20 0549    Lab Status:  No result Specimen:  Blood from Arm, Right     UA w Reflex to Microscopic w Reflex to Culture [[id number]]     Lab Status:  No result Specimen:  Urine     POCT pregnancy, urine [[id number]]     Lab Status:  No result     Comprehensive metabolic panel [[id number]]     Lab Status:  No result Specimen:  Blood     Protime-INR [[id number]]     Lab Status:  No result Specimen:  Blood     APTT [[id number]]     Lab Status:  No result Specimen:  Blood     C-reactive protein [[id number]]     Lab Status:  No result Specimen:  Blood     Blood culture #2 [[id number]] Lab Status:  No result Specimen:  Blood from Arm, Left                  XR chest 1 view portable   ED Interpretation by Kevin Garcia MD (08/10 3590)   Chest No effusion no infiltrates normal cardiac silhouette                 Procedures  Procedures         ED Course       US AUDIT      Most Recent Value   Initial Alcohol Screen: US AUDIT-C    1  How often do you have a drink containing alcohol?  0 Filed at: 08/10/2020 0416   2  How many drinks containing alcohol do you have on a typical day you are drinking? 0 Filed at: 08/10/2020 0416   3b  FEMALE Any Age, or MALE 65+: How often do you have 4 or more drinks on one occassion? 0 Filed at: 08/10/2020 0416   Audit-C Score  0 Filed at: 08/10/2020 0416                  JENISE/DAST-10      Most Recent Value   How many times in the past year have you    Used an illegal drug or used a prescription medication for non-medical reasons? Daily or Almost Daily Filed at: 08/10/2020 0416   In the past 12 months      1  Have you used drugs other than those required for medical reasons? 1 Filed at: 08/10/2020 0416   2  Do you use more than one drug at a time? 0 Filed at: 08/10/2020 0416   3  Have you had medical problems as a result of your drug use (e g , memory loss, hepatitis, convulsions, bleeding, etc )? 0 Filed at: 08/10/2020 0416   4  Have you had "blackouts" or "flashbacks" as a result of drug use? YesNo  1 Filed at: 08/10/2020 0416   5  Do you ever feel bad or guilty about your drug use? 1 Filed at: 08/10/2020 0416   6  Does your spouse (or parent) ever complain about your involvement with drugs? 0 Filed at: 08/10/2020 0416   7  Have you neglected your family because of your use of drugs? 0 Filed at: 08/10/2020 0416   8  Have you engaged in illegal activities in order to obtain drugs? 0 Filed at: 08/10/2020 0416   9  Have you ever experienced withdrawal symptoms (felt sick) when you stopped taking drugs? 1 Filed at: 08/10/2020 0416   10   Are you always able to stop using drugs when you want to?  0 Filed at: 08/10/2020 0416   DAST-10 Score  (!) 4 Filed at: 08/10/2020 0416                                [facility]  Number of Diagnoses or Management Options  Diagnosis management comments: Patient had difficult vascular access multiple attempts made for IV and lab work  Case discussed with internal medicine at [facility] patient will need to be transferred she will need cardiology consultation probably will need transesophageal echo to evaluate but stations on valve she will need an ID consultation none of which is available here at [facility]   She any consideration for different type of vascular access as well as IV antibiotic treatment      Patient is hemodynamically stable here in emergency department  Disposition  Final diagnoses:   Acute bacterial endocarditis     Time reflects when diagnosis was documented in both MDM as applicable and the Disposition within this note     Time User Action Codes Description Comment    8/10/2020  6:17 AM Erica Macdonald Add [I33 0] Acute bacterial endocarditis       ED Disposition     ED Disposition Condition Date/Time Comment    Transfer to Another [facility] Aug 10, 2020  6:16 AM Maulik Alvarado should be transferred out to [address] [place] [address] [address]          MD Documentation      Most Recent Value   Patient Condition  The patient has been stabilized such that within reasonable medical probability, no material deterioration of the patient condition or the condition of the unborn child(leigh) is likely to result from the transfer   Reason for Transfer  Level of Care needed not available at this facility   Benefits of Transfer  Specialized equipment and/or services available at the receiving facility (Include comment)________________________, Continuity of care, Patient preference, Other benefits (Include comment)_______________________   Risks of Transfer  Potential for delay in receiving treatment, Potential deterioration of medical condition, Loss of IV, Increased discomfort during transfer   Accepting Physician  Dr Eugenia Tobias Name, Evie Guillermo       RN Documentation      Most [facility] Name, Evie Guillermo       Follow-up Information    None         Patient's Medications    No medications on file     No discharge procedures on file      PDMP Review     None          ED Provider  Electronically Signed by           Ancelmo Parsons MD  08/10/20 1935

## 2020-08-10 NOTE — EMTALA/ACUTE CARE TRANSFER
[facility] EMERGENCY DEPARTMENT  [address] [facility] [address]  Dept: [phone]      ANWVJV TRANSFER CONSENT    NAME Leigh Ann RETANA 1990                              MRN [id number]    I have been informed of my rights regarding examination, treatment, and transfer   by Dr Marcellus Briggs MD    Benefits: Specialized equipment and/or services available at the receiving facility (Include comment)________________________, Continuity of care, Patient preference, Other benefits (Include comment)_______________________    Risks: Potential for delay in receiving treatment, Potential deterioration of medical condition, Loss of IV, Increased discomfort during transfer      Transfer Request   I acknowledge that my medical condition has been evaluated and explained to me by the emergency department physician or other qualified medical person and/or my attending physician who has recommended and offered to me further medical examination and treatment  I understand the Hospital's obligation with respect to the treatment and stabilization of my emergency medical condition  I nevertheless request to be transferred  I release the Hospital, the doctor, and any other persons caring for me from all responsibility or liability for any injury or ill effects that may result from my transfer and agree to accept all responsibility for the consequences of my choice to transfer, rather than receive stabilizing treatment at the Hospital  I understand that because the transfer is my request, my insurance may not provide reimbursement for the services  The Hospital will assist and direct me and my family in how to make arrangements for transfer, but the hospital is not liable for any fees charged by the transport service    In spite of this understanding, I refuse to consent to further medical examination and treatment which has been offered to me, and request transfer to [address] [place] [address] Rd Name, [facility] : Soledad Pi   I authorize the performance of emergency medical procedures and treatments upon me in both transit and upon arrival at the receiving facility  Additionally, I authorize the release of any and all medical records to the receiving facility and request they be transported with me, if possible  I authorize the performance of emergency medical procedures and treatments upon me in both transit and upon arrival at the receiving facility  Additionally, I authorize the release of any and all medical records to the receiving facility and request they be transported with me, if possible  I understand that the safest mode of transportation during a medical emergency is an ambulance and that the Hospital advocates the use of this mode of transport  Risks of traveling to the receiving facility by car, including absence of medical control, life sustaining equipment, such as oxygen, and medical personnel has been explained to me and I fully understand them  (VITALY CORRECT BOX BELOW)  [  ]  I consent to the stated transfer and to be transported by ambulance/helicopter  [  ]  I consent to the stated transfer, but refuse transportation by ambulance and accept full responsibility for my transportation by car  I understand the risks of non-ambulance transfers and I exonerate the Hospital and its staff from any deterioration in my condition that results from this refusal     X___________________________________________    DATE  08/10/20  TIME________  Signature of patient or legally responsible individual signing on patient behalf           RELATIONSHIP TO PATIENT_________________________          Provider Certification    NAME Gleda Ahumada                                         1990                              MRN [id number]    A medical screening exam was performed on the above named patient    Based on the examination:    Condition Necessitating Transfer The encounter diagnosis was Acute bacterial endocarditis  Patient Condition: The patient has been stabilized such that within reasonable medical probability, no material deterioration of the patient condition or the condition of the unborn child(leigh) is likely to result from the transfer    Reason for Transfer: Level of Care needed not available at this facility    Transfer Requirements: [address]  [facility]    · Space available and qualified personnel available for treatment as acknowledged by    · Agreed to accept transfer and to provide appropriate medical treatment as acknowledged by       Dr Ted Evans  · Appropriate medical records of the examination and treatment of the patient are provided at the time of transfer   [address] [facility], [address] _______  · Transfer will be performed by qualified personnel from    and appropriate transfer equipment as required, including the use of necessary and appropriate life support measures  Provider Certification: I have examined the patient and explained the following risks and benefits of being transferred/refusing transfer to the patient/family:         Based on these reasonable risks and benefits to the patient and/or the unborn child(leigh), and based upon the information available at the time of the patients examination, I certify that the medical benefits reasonably to be expected from the provision of appropriate medical treatments at another medical facility outweigh the increasing risks, if any, to the individuals medical condition, and in the case of labor to the unborn child, from effecting the transfer      X____________________________________________ DATE 08/10/20        TIME_______      ORIGINAL - SEND TO MEDICAL RECORDS   COPY - SEND WITH PATIENT DURING TRANSFER

## 2020-08-10 NOTE — ED NOTES
Multiple attempts @ obtaining IV access with no success by multiple nurses  Patient yelling and screaming in pain  Given IM injection for pain to see how patient responds  Will continue to monitor and attempt for IV access again       [address] Ana Morales, RN  08/10/20 1787

## 2020-08-10 NOTE — ED NOTES
Patient still unable to void at this time  Patient still yelling and cursing in room with nurse about pain level 10/10  MD made aware       Db Steele RN  08/10/20 9582

## 2020-08-10 NOTE — ASSESSMENT & PLAN NOTE
· 2D echo on 07/21/2020: medium-sized, papillary, mobile vegetation, measuring 11 mm x 10 mm on the tip of the anterior leaflet, on the right atrial aspect    Will await ID input

## 2020-08-10 NOTE — PROGRESS NOTES
2 calls made in attempt to receive report  Busy signal both times at number  given by the supervisor

## 2020-08-10 NOTE — NURSING NOTE
Pt doesn't not want medical treatment while she is in pain  She did not allow aides to grab blood work or vitals  She refuses medication from me  I asked her why and she said she is in a lot of pain and doesn't want anything done right now  I will continue PRN pain medications for her  Will continue to monitor  Notified doctor

## 2020-08-10 NOTE — ED NOTES
Patient refusing EKG as she states that her pain level is still 10/10 despite several administration of pain medication  Attempted to reposition patient in bed and give ice for comfort and patient still has no relief  Patient unable to be redirected and calmed  Patient cursing and yelling loudly at nurse and unable to be consoled  Patient refusing to keep BP cuff and pulse ox attached to monitor VS   MD is aware, will continue to monitor        202 Ana Morales, RN  08/10/20 48 Massiel Herzog RN  08/10/20 9706

## 2020-08-10 NOTE — ED NOTES
Patient is resting comfortably, sleeping in room  No obvious distress or pain noted at this time  Respirations even and unlabored  Will continue to observe        Yuki Acosta RN  08/10/20 9472

## 2020-08-10 NOTE — H&P
H&P- Debbie Crenshaw 1990, 34 y o  female MRN: [id number]    Unit/Bed#: S -01 Encounter: [id number]    Primary Care Provider: Aruna Lora PA-C   Date and time admitted to hospital: 8/10/2020 10:23 AM      * Bacteremia due to Staphylococcus aureus  Assessment & Plan  · Patient with history of MSSA bacteremia, left against medical advice from [address] [place] [address] and then [place] on 07/22/2020, then got admitted at [facility] on 07/30/2020 , left AMA on 08/04/2020  Was not prescribed any p o  Antibiotics at the time of discharge as she wishes to get admitted at [address] [place] [address]  Today she presented at [facility] Emergency Room due to pain  · Will continue high-dose IV cefazolin  · Consult infectious disease team  · Follow repeat blood cultures  · Patient had MRI thoracolumbar spine 08/01/2020 and there was no evidence of any abscess/septic emboli  · CT chest abdomen and pelvis 08/01/2020: Impression:   1  Moderate degree of multifocal nodular airspace consolidations bilaterally  with peripheral predominance compatible with septic emboli  2  Small right pleural fluid including thin loculated pleural effusion  laterally  3  Bilateral renal parenchymal abnormalities favored to represent bilateral  pyelonephritis  No abscess  4  Splenomegaly  Acute bacterial endocarditis  Assessment & Plan  · 2D echo on 07/21/2020: medium-sized, papillary, mobile vegetation, measuring 11 mm x 10 mm on the tip of the anterior leaflet, on the right atrial aspect  Will await ID input    Septic embolism (HCC)  Assessment & Plan  · Septic emboli noted to lungs and kidney  Continue antibiotics coverage for MSSA  Id consultation request   Follow repeat blood cultures    Hyponatremia  Assessment & Plan  · Likely some dehydration  Encourage p o  Intake    Heroin abuse ([address] [address] )  Assessment & Plan  · Patient with history of IV drug abuse  Admits to using heroin about 3-4 days ago  Will check UDS    Continue to encourage cessation, will consult host closer to discharge time  Patient requesting IV Dilaudid, discussed with patient will not be administering any IV narcotics  Will order Percocet p r n , and IV Toradol  · She reports that she has not been taking Suboxone anymore    Hypokalemia  Assessment & Plan  · Will repeat with potassium  And recheck a m  Bipolar 1 disorder ([address] )  Assessment & Plan  · Reports being off for psych medications  Will request Psychiatry consultation  Will resume Remeron    VTE Prophylaxis: Low risk ambulate  / sequential compression device   Code Status:  Full code  POLST: There is no POLST form on file for this patient (pre-hospital)  Discussion with family:     Anticipated Length of Stay:  Patient will be admitted on an Inpatient basis with an anticipated length of stay of    2 midnights  Justification for Hospital Stay:  IV antibiotics    Total Time for Visit, including Counseling / Coordination of Care: 1 hour  Greater than 50% of this total time spent on direct patient counseling and coordination of care  Chief Complaint:   Pain    History of Present Illness:    Maulik Alvarado is a 34 y o  female with history of bipolar disorder, MSSA bacteremia, and IV drug abuse who presents with increasing pain level  Patient was admitted [facility] on 07/15/2020, she was noted to have MSSA bacteremia  She signed out [place] on 07/22/2020  Then subsequently was admitted at [facility], from 07/31/2020 to  08/04/2020, then again left AMA  She was not on any p o  Antibiotics  She presents to ED [facility] today complaining of pain all over  Admits to using heroin about 3-4 days ago  Refusing to provide any history until she receives IV Dilaudid  Points to pain in right buttock and lower back  Denies fever chills or rigors  Denies cough, chest pain or sputum       Review of Systems:    Review of Systems  Twelve point review systems negative except noted above  Past Medical and Surgical History:     Past Medical History:   Diagnosis Date    Abnormal Pap smear of cervix     Anxiety     Depression     Endocarditis     2018    Hepatitis C     HPV (human papilloma virus) anogenital infection        Past Surgical History:   Procedure Laterality Date    KNEE SURGERY Left     MOUTH SURGERY         Meds/Allergies:    Prior to Admission medications    Not on File     I have reviewed home medications with patient personally  Allergies: Allergies   Allergen Reactions    Latex     Pollen Extract        Social History:     Marital Status: Single   Occupation:   Patient Pre-hospital Living Situation:  With friends  Patient Pre-hospital Level of Mobility:  Normally independent  Patient Pre-hospital Diet Restrictions:  None  Substance Use History:   Social History     Substance and Sexual Activity   Alcohol Use No     Social History     Tobacco Use   Smoking Status Former Smoker    Packs/day: 0 25    Types: Cigarettes    Last attempt to quit: 11/9/2016    Years since quitting: 3 7   Smokeless Tobacco Never Used   Tobacco Comment    current everyday smoker per [place]     Social History     Substance and Sexual Activity   Drug Use Yes    Types: Heroin    Comment: last use - one week ago       Family History:    non-contributory    Physical Exam:     Vitals:        Physical Exam     Gen -Patient comfortable   Neck- Supple  No thyromegaly or lymphadenopathy  Lungs-Clear bilaterally without any wheeze or rales   Heart S1-S2, regular rate and rhythm, no murmurs  Abdomen-soft nontender, no organomegaly  Bowel sounds present  Extremities-no cyanosi,  clubbing or edema  Skin- no rash  Neuro-nonfocal     Additional Data:     Lab Results: I have personally reviewed pertinent reports        Results from last 7 days   Lab Units 08/10/20  0639   WBC Thousand/uL 5 82   HEMOGLOBIN g/dL 9 2*   HEMATOCRIT % 28 2*   PLATELETS Thousands/uL 322   NEUTROS PCT % 87* LYMPHS PCT % 8*   MONOS PCT % 4   EOS PCT % 0     Results from last 7 days   Lab Units 08/10/20  0638   SODIUM mmol/L 134   POTASSIUM mmol/L 2 9*   CHLORIDE mmol/L 96   CO2 mmol/L 26   BUN mg/dL 15   CREATININE mg/dL 0 61   ANION GAP mmol/L 12   CALCIUM mg/dL 8 6   ALBUMIN g/dL 3 0*   TOTAL BILIRUBIN mg/dL 0 82   ALK PHOS U/L 130 0   ALT U/L 59*   AST U/L 134*   GLUCOSE RANDOM mg/dL 101     Results from last 7 days   Lab Units 08/10/20  0638   INR  1 20*                   Imaging: I have personally reviewed pertinent reports  No orders to display       EKG, Pathology, and Other Studies Reviewed on Admission:   · EKG:     [facility] Yes     ** Please Note: This note has been constructed using a voice recognition system   **

## 2020-08-10 NOTE — PROGRESS NOTES
We came to evaluate the patient but at this moment patient states that she does not want to be seen she want to sleep  Will try again tomorrow       Mireya Parham MD

## 2020-08-10 NOTE — ASSESSMENT & PLAN NOTE
· Patient with history of IV drug abuse  Admits to using heroin about 3-4 days ago  Will check UDS  Continue to encourage cessation, will consult host closer to discharge time  Patient requesting IV Dilaudid, discussed with patient will not be administering any IV narcotics  Will order Percocet p r n , and IV Toradol     · She reports that she has not been taking Suboxone anymore

## 2020-08-10 NOTE — ED NOTES
Patient room assignment changed to [facility]  # to call report is [phone]   P/U by SLEMANDI scheduled for 0930am       Norberto Jo RN  08/10/20 [id number]

## 2020-08-10 NOTE — ED NOTES
Patient still c/o 10/10 pain after recent admin of MD Peter made aware        202 Ana Morales, RN  08/10/20 4142

## 2020-08-10 NOTE — ASSESSMENT & PLAN NOTE
· Septic emboli noted to lungs and kidney  Continue antibiotics coverage for MSSA    Id consultation request   Follow repeat blood cultures

## 2020-08-10 NOTE — ASSESSMENT & PLAN NOTE
· Reports being off for psych medications  Will request Psychiatry consultation    Will resume Remeron

## 2020-08-11 PROBLEM — M54.9 BACK PAIN: Status: ACTIVE | Noted: 2020-08-11

## 2020-08-11 PROCEDURE — 99232 SBSQ HOSP IP/OBS MODERATE 35: CPT | Performed by: INTERNAL MEDICINE

## 2020-08-11 PROCEDURE — NC001 PR NO CHARGE: Performed by: PSYCHIATRY & NEUROLOGY

## 2020-08-11 RX ORDER — GABAPENTIN 100 MG/1
100 CAPSULE ORAL 3 TIMES DAILY
Status: DISCONTINUED | OUTPATIENT
Start: 2020-08-11 | End: 2020-08-12 | Stop reason: HOSPADM

## 2020-08-11 RX ORDER — IBUPROFEN 600 MG/1
600 TABLET ORAL EVERY 6 HOURS PRN
Status: DISCONTINUED | OUTPATIENT
Start: 2020-08-11 | End: 2020-08-12 | Stop reason: HOSPADM

## 2020-08-11 RX ORDER — OXYCODONE HYDROCHLORIDE 5 MG/1
5 TABLET ORAL EVERY 4 HOURS PRN
Status: DISCONTINUED | OUTPATIENT
Start: 2020-08-11 | End: 2020-08-12 | Stop reason: HOSPADM

## 2020-08-11 RX ORDER — OXYCODONE HYDROCHLORIDE 10 MG/1
10 TABLET ORAL EVERY 4 HOURS PRN
Status: DISCONTINUED | OUTPATIENT
Start: 2020-08-11 | End: 2020-08-12 | Stop reason: HOSPADM

## 2020-08-11 RX ORDER — ACETAMINOPHEN 325 MG/1
975 TABLET ORAL EVERY 8 HOURS SCHEDULED
Status: DISCONTINUED | OUTPATIENT
Start: 2020-08-11 | End: 2020-08-12 | Stop reason: HOSPADM

## 2020-08-11 RX ADMIN — OXYCODONE HYDROCHLORIDE 10 MG: 10 TABLET ORAL at 11:14

## 2020-08-11 RX ADMIN — ACETAMINOPHEN 975 MG: 325 TABLET, FILM COATED ORAL at 14:05

## 2020-08-11 RX ADMIN — ACETAMINOPHEN 975 MG: 325 TABLET, FILM COATED ORAL at 21:00

## 2020-08-11 RX ADMIN — CEFAZOLIN SODIUM 2000 MG: 2 SOLUTION INTRAVENOUS at 03:48

## 2020-08-11 RX ADMIN — CEFAZOLIN SODIUM 2000 MG: 2 SOLUTION INTRAVENOUS at 20:39

## 2020-08-11 RX ADMIN — OXYCODONE HYDROCHLORIDE 10 MG: 10 TABLET ORAL at 15:21

## 2020-08-11 RX ADMIN — CEFAZOLIN SODIUM 2000 MG: 2 SOLUTION INTRAVENOUS at 12:16

## 2020-08-11 RX ADMIN — GABAPENTIN 100 MG: 100 CAPSULE ORAL at 21:01

## 2020-08-11 RX ADMIN — OXYCODONE HYDROCHLORIDE 10 MG: 10 TABLET ORAL at 20:39

## 2020-08-11 RX ADMIN — OXYCODONE HYDROCHLORIDE AND ACETAMINOPHEN 1 TABLET: 5; 325 TABLET ORAL at 00:55

## 2020-08-11 RX ADMIN — GABAPENTIN 100 MG: 100 CAPSULE ORAL at 11:14

## 2020-08-11 RX ADMIN — KETOROLAC TROMETHAMINE 15 MG: 30 INJECTION, SOLUTION INTRAMUSCULAR at 03:47

## 2020-08-11 RX ADMIN — MIRTAZAPINE 30 MG: 15 TABLET, FILM COATED ORAL at 21:01

## 2020-08-11 NOTE — ASSESSMENT & PLAN NOTE
Does not appear to be withdrawing  Denies nausea, vomiting, sweating  Received 5mg dilautic on day 1  Currently on pain regimen: ibuprofen (mild), oxycodone 5mg (moderate), oxycodone 10mg (severe)  Patient with history of IV drug abuse  Admits to using heroin about 3-4 days ago  Active IVDU  Patient requesting IV Dilaudid, discussed with patient will not be administering any IV narcotics  She reports that she has not been taking Suboxone anymore  Patient is at high risk for development of withdrawal and again leaving AMA  Plan:   · On pain regimen: ibuprofen (mild), oxycodone 5mg (moderate), oxycodone 10mg (severe)  · Check UDS - patient refusing  · Continue to encourage cessation, will consult host closer to discharge time

## 2020-08-11 NOTE — PROGRESS NOTES
Attempted to re-evaluate patient this a m; previously attempted approximately 2 hours prior  Patient declined psychiatric consultation and states that she is in pain and needs to sleep  She states she was offered consultation including med management by psychiatry (Roya/Cassandra) 07/15/20 admission, although declined because of medical complaints  Presently, she states: "maybe we can have a conversation when I'm better, but not right now"  Psychiatry to sign off  Consultation appreciated  Please do not hesitate to call/contact our service if we can be of additional assistance  Thank you  Gambia Holter, D O    Psychiatry PGY-2

## 2020-08-11 NOTE — ASSESSMENT & PLAN NOTE
Secondary to chronic pain, opioid dependence   Consider infection in the setting of MSSA bacteremia   Thoracic and lumbar spine MRIs performed at [facility] neg  Plan:    · See above pain management regimen  · Encourage movement

## 2020-08-11 NOTE — PROGRESS NOTES
Patient would not allow RN to do assessment on her  She also refused her Potassium and blood work this AM  RN educated patient on importance but still refused  Will continue to monitor

## 2020-08-11 NOTE — ASSESSMENT & PLAN NOTE
Tricuspid valve endocarditis, 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   Recent MELISSA showed large vegetation on TV with severe regurgitation   Recent CT abdomen/pelvis also showed bilateral renal parenchymal abnormalities concerning for septic emboli   No intra-abdominal abscess   Respiratory status remains stable with no hypoxia  Septic emboli noted to lungs and kidney  Plan:  · Continue antibiotics coverage for MSSA     · Id consultation request  Recs CT surgery eval   · Follow repeat blood cultures

## 2020-08-11 NOTE — PLAN OF CARE
Problem: PAIN - ADULT  Goal: Verbalizes/displays adequate comfort level or baseline comfort level  Description: Interventions:  - Encourage patient to monitor pain and request assistance  - Assess pain using appropriate pain scale  - Administer analgesics based on type and severity of pain and evaluate response  - Implement non-pharmacological measures as appropriate and evaluate response  - Consider cultural and social influences on pain and pain management  - Notify physician/advanced practitioner if interventions unsuccessful or patient reports new pain  Outcome: Not Progressing     Problem: INFECTION - ADULT  Goal: Absence or prevention of progression during hospitalization  Description: INTERVENTIONS:  - Assess and monitor for signs and symptoms of infection  - Monitor lab/diagnostic results  - Monitor all insertion sites, i e  indwelling lines, tubes, and drains  - Monitor endotracheal if appropriate and nasal secretions for changes in amount and color  - [place] appropriate cooling/warming therapies per order  - Administer medications as ordered  - Instruct and encourage patient and family to use good hand hygiene technique  - Identify and instruct in appropriate isolation precautions for identified infection/condition  Outcome: Not Progressing     Problem: Knowledge Deficit  Goal: Patient/family/caregiver demonstrates understanding of disease process, treatment plan, medications, and discharge instructions  Description: Complete learning assessment and assess knowledge base    Interventions:  - Provide teaching at level of understanding  - Provide teaching via preferred learning methods  Outcome: Not Progressing     Problem: Prexisting or High Potential for Compromised Skin Integrity  Goal: Skin integrity is maintained or improved  Description: INTERVENTIONS:  - Identify patients at risk for skin breakdown  - Assess and monitor skin integrity  - Assess and monitor nutrition and hydration status  - Monitor labs   - Assess for incontinence   - Turn and reposition patient  - Assist with mobility/ambulation  - Relieve pressure over bony prominences  - Avoid friction and shearing  - Provide appropriate hygiene as needed including keeping skin clean and dry  - Evaluate need for skin moisturizer/barrier cream  - Collaborate with interdisciplinary team   - Patient/family teaching  - Consider wound care consult   Outcome: Progressing     Problem: Potential for Falls  Goal: Patient will remain free of falls  Description: INTERVENTIONS:  - Assess patient frequently for physical needs  -  Identify cognitive and physical deficits and behaviors that affect risk of falls  -  [place] fall precautions as indicated by assessment   - Educate patient/family on patient safety including physical limitations  - Instruct patient to call for assistance with activity based on assessment  - Modify environment to reduce risk of injury  - Consider OT/PT consult to assist with strengthening/mobility  Outcome: Progressing     Problem: SAFETY ADULT  Goal: Patient will remain free of falls  Description: INTERVENTIONS:  - Assess patient frequently for physical needs  -  Identify cognitive and physical deficits and behaviors that affect risk of falls    -  [place] fall precautions as indicated by assessment   - Educate patient/family on patient safety including physical limitations  - Instruct patient to call for assistance with activity based on assessment  - Modify environment to reduce risk of injury  - Consider OT/PT consult to assist with strengthening/mobility  Outcome: Progressing

## 2020-08-11 NOTE — UTILIZATION REVIEW
Initial Clinical Review  TRANSFER FROM [facility] [place] ED TO [facility] FOR HIGHER LEVEL OF CARE    Admission: Date/Time/Statement:   Admission Orders (From admission, onward)     Ordered        08/10/20 1123  Inpatient Admission  Once                   Orders Placed This Encounter   Procedures    Inpatient Admission     Standing Status:   Standing     Number of Occurrences:   1     Order Specific Question:   Admitting Physician     Answer:   Bernardo Mckeon     Order Specific Question:   Level of Care     Answer:   Med Surg [16]     Order Specific Question:   Estimated length of stay     Answer:   More than 2 Midnights     Order Specific Question:   Certification     Answer:   I certify that inpatient services are medically necessary for this patient for a duration of greater than two midnights  See H&P and MD Progress Notes for additional information about the patient's course of treatment  Assessment/Plan:  35 yo female transferred from [facility] ED to [facility] admitted as Inpatient due to requiring IV antibiotics for Bacteremia due to staphylococcus auresus, septic emboili  in the setting of IV drug abuse with increased pain  PMHx bipolar disorder, MSSA bacteremia, and IV drug abuse  presents with increasing pain level  Ad[facility] to [facility] on 07/15/2020, with MSSA bacteremia  Pt signed out [facility] on 07/22/2020  Then subsequently was admitted at a nearby hospital, from 07/31/2020 to 08/04/2020, then again left AMA  Patient presented to ED [facility] with pain all over & admits using Heroin about 3-4 days prior, currently not taking Suboxone  Refusing to provide hx until she receives IV Dilaudid but points to right buttock & lower back  Consult Infectious disease  Inpatient labs obtained; follow repeat blood cultures  8/1/2020 CT chest/abd/pelvis with septic emboli present  7/21/2020 2 D echo= mobile vegetations  Cont high dose IV Cefazolin  Monitor & replace electrolytes   Consult Behavioral health  8/10/2020 Infectious disease:  Per ID: prolonged MSSA bacteremia with TV endocarditis- cont IV Cefazolin 2 gm Q8hr; follow bld cultures, monitor vitals & CBC  Obtain CT surgery eval for Tricuspid valve endocarditis- may warrant IR eval for thoracentesis  Recent foot cellulitis- cont IV antibiotics & serial foot exams  Back pain more secondary to chronic pain & Opioid dependence  Serial exams  Active IV drug abuser- consider eval by acute pain ; not a candidate for at home PICC line/IV antibx  8/10/2020 Behavioral   Unable to eval pt stating she wishes to sleep  8/11/2020 Infectiosus Disease:  Recurrent /persistent MSSA bacteremia with TV endocarditis- bld cultures from 7/15/2020 were positive, prolonged course of IV antibx recommended but pt left AMA  Again at nearby hosp found with MSSA bacteremia on 7/30/2020 but left AMA again  Blood cultures again +; so far afebrile & clinically stable  Cont IV Cefazolin 2g Q8hr; follow up pending bld cultures; recommend CT surgery eval, monitor vitals & hemodynamics 7 monitor CBC  Tricuspid valve endocarditis with Septic pulmonary emboli and right loculated effusion-recent MELISSA=lg vegetation on TV with sev regurgitation  Recent CT a/p is concern for Septic emboli  Cont antibx, recommend CT surgery eval, may warrant IR eval for thoracentesis  Serial exam for Left foot cellulitis & IV antibx  8/11/2020 Behavioral Health: Attempt to re eval patient since this am- pt declined Psych consultation & states she is in pain & requires sleep  Prior consult incl med management offered by [facility] on 7/15/2020 admission & pt declined bc of medical complaints  Presently stating" maybe have conversation when I am better but not right now"        Triage Vitals   Temperature Pulse Respirations Blood Pressure SpO2   08/10/20 1346 08/10/20 1346 08/10/20 1346 08/10/20 1346 08/10/20 1346   98 3 °F (36 8 °C) (!) 115 20 120/55 94 %      Temp Source Heart Rate Source Patient Position - Orthostatic VS BP Location FiO2 (%)   08/10/20 1346 -- 08/10/20 2300 08/10/20 2300 --   Oral  Lying Left arm       Pain Score       08/10/20 1202       Worst Possible Pain          Wt Readings from Last 1 Encounters:   08/10/20 63 5 kg (140 lb)     Additional Vital Signs:   Date/Time   Temp   Pulse   Resp   BP   MAP (mmHg)   SpO2   O2 Device   Patient Position - Orthostatic VS    08/11/20 0715   --   --   --   --   --   --   None (Room air)   --    08/10/20 2300   97 6 °F (36 4 °C)   99   18   103/55   72   96 %   None (Room air)   Lying    08/10/20 1346   98 3 °F (36 8 °C)   115Abnormal     20   120/55   --   94 %   None (Room air)   --    08/10/20 1202   --   --   --   --   --   --   None (Room air)   --       Weights (last 14 days)     Date/Time   Weight   Weight Method   Height    08/10/20 1346   63 5 kg (140 lb)   Stated   5' 5" (1 651 m)       Pertinent Labs/Diagnostic Test Results:       Results from last 7 days   Lab Units 08/10/20  0639   WBC Thousand/uL 5 82   HEMOGLOBIN g/dL 9 2*   HEMATOCRIT % 28 2*   PLATELETS Thousands/uL 322   NEUTROS ABS Thousands/µL 4 98         Results from last 7 days   Lab Units 08/10/20  0638   SODIUM mmol/L 134   POTASSIUM mmol/L 2 9*   CHLORIDE mmol/L 96   CO2 mmol/L 26   ANION GAP mmol/L 12   BUN mg/dL 15   CREATININE mg/dL 0 61   EGFR ml/min/1 73sq m 125   CALCIUM mg/dL 8 6     Results from last 7 days   Lab Units 08/10/20  0638   AST U/L 134*   ALT U/L 59*   ALK PHOS U/L 130 0   TOTAL PROTEIN g/dL 6 9   ALBUMIN g/dL 3 0*   TOTAL BILIRUBIN mg/dL 0 82         Results from last 7 days   Lab Units 08/10/20  0638   GLUCOSE RANDOM mg/dL 101       Results from last 7 days   Lab Units 08/10/20  0638   PROTIME seconds 12 6*   INR  1 20*   PTT seconds 31         Results from last 7 days   Lab Units 08/10/20  0638   CRP mg/L 32 9*       Results from last 7 days   Lab Units 08/10/20  0638 08/10/20  0546   GRAM STAIN RESULT  Gram positive cocci in clusters* Gram positive cocci in clusters*     8/10 cxr= No acute cardiopulmonary disease   Persistent right lower lobe opacity which may be related to loculated pleural effusion  8/1/2020 CT chest abdomen and pelvis : Impression:   1  Moderate degree of multifocal nodular airspace consolidations bilaterally  with peripheral predominance compatible with septic emboli  2  Small right pleural fluid including thin loculated pleural effusion  laterally  3  Bilateral renal parenchymal abnormalities favored to represent bilateral  pyelonephritis  No abscess  4  Splenomegaly  · 2D echo on 07/21/2020: medium-sized, papillary, mobile vegetation, measuring 11 mm x 10 mm on the tip of the anterior leaflet, on the right atrial aspect  Past Medical History:   Diagnosis Date    Abnormal Pap smear of cervix     Anxiety     Depression     Endocarditis     2018    Hepatitis C     HPV (human papilloma virus) anogenital infection      Present on Admission:   Bacteremia due to Staphylococcus aureus   [facility]    Admitting Diagnosis: Endocarditis [I38]  Age/Sex: 34 y o  female  Admission Orders:  Scheduled Medications:  cefazolin, 2,000 mg, Intravenous, Q8H  mirtazapine, 30 mg, Oral, HS  potassium chloride, 40 mEq, Oral, TID With Meals      Continuous IV Infusions:     PRN Meds:  calcium carbonate, 1,000 mg, Oral, Daily PRN  docusate sodium, 100 mg, Oral, BID PRN  ketorolac, 15 mg, Intravenous, Q6H PRN  ondansetron, 4 mg, Intravenous, Q6H PRN  oxyCODONE-acetaminophen, 1 tablet, Oral, Q4H PRN      IP CONSULT TO INFECTIOUS DISEASES  IP CONSULT TO PSYCHIATRY  Venous access consult  Spot pulse oximetry  Ambulate pt  Network Utilization Review Department  [email or web address]  ATTENTION: Please call with any questions or concerns to [phone] and carefully listen to the prompts so that you are directed to the right person   All voicemails are confidential   Erlinda Bustos all requests for admission clinical reviews, approved or denied determinations and any other requests to dedicated fax number below belonging to the campus where the patient is receiving treatment   List of dedicated fax numbers for the Facilities:  [address] DENIALS (Administrative/Medical Necessity) [phone]   [address]  (Maternity/NICU/Pediatrics) [phone]   [facility] [place] [phone]   [facility] [phone]   David Berg [phone]   [facility] [phone]   [address] [place] [address] [phone]   [facility]  [phone]   [address] [facility], [facility]  [address] [facility] [address] [facility] [phone]

## 2020-08-11 NOTE — CONSULTS
Psychiatric Consult Evaluation - [facility] 34 y o  female MRN: [id number]  Unit/Bed#: S -01 Encounter: [id number]    Assessment   Principal Problem:    Bacteremia due to Staphylococcus aureus  Active Problems:    Hyponatremia    Heroin abuse ([facility] )    Acute bacterial endocarditis    Septic embolism ([facility] )    Hypokalemia    Bipolar 1 disorder ([facility] )    Plan       Pt not currently amenable to psychiatric treatment     Reason for Consult: Bipolar 1 Disorder     History of Present Illness     Tristen Huerta is a 34 y o  female, who presented to [address] [place] [address] ED for R back pain, possessing pertinent psychiatric history of BPAD 1, complaining of "pain"    Pt was seen by writer & senior resident multiple times 8/11  Pt initially asked to defer interview, due to HA and nausea  When seen again pt reported that she did not wish to speak with psychiatry until she was "feeling better " Pt was not amenable to further questioning  Interview was at this point terminated  Medical Review Of Systems:  Could not be performed      Psychiatric Review Of Systems:  sleep: could not be assessed due to limitations of interview (see HPI)  appetite changes:could not be assessed due to limitations of interview (see HPI)  weight changes: could not be assessed due to limitations of interview (see HPI)  energy/anergy: pt reported that she did not have the energy to speak with interviewers at this time  interest/pleasure/anhedonia: could not be assessed due to limitations of interview (see HPI)  somatic symptoms: could not be assessed due to limitations of interview (see HPI)  anxiety/panic: could not be assessed due to limitations of interview (see HPI)}  lucie: could not be assessed due to limitations of interview (see HPI)  guilty/hopeless: could not be assessed due to limitations of interview (see HPI)self injurious behavior/risky behavior: could not be assessed due to limitations of interview (see HPI)    Historical Information     Past Psychiatric History:   Past Psychiatric management: Per chart review- 1 prior hospitalization at [facility] associated with LSD  Past Suicide attempts: Per chart review- denied as of 7/15/20  Past Violent behavior: Per chart review- denied as of 7/15/20  Past Psychiatric medication trial: per chart review pt has extensive psychiatric history with multiple prior medications for mood: Remeron, Seroquel, Trileptal, Lithium, Clonidine, Xanax, Klonopin, Celexa, Gabapentin, Lyrica, Abilify, Zyprexa, Paxil, Depakote and Wellbutrin       Substance Abuse History:      Social History     Tobacco History     Smoking Status  Former Smoker Quit date  11/9/2016 Smoking Frequency  0 25 packs/day Smoking Tobacco Type  Cigarettes    Smokeless Tobacco Use  Never Used    Tobacco Comment  current everyday smoker per Darrel Farrell          Alcohol History     Alcohol Use Status  No          Drug Use     Drug Use Status  Yes Types  Heroin Comment  last use - one week ago          Sexual Activity     Sexually Active  Yes          Activities of Daily Living    Not Asked                   Family Psychiatric History:   Could not be assessed due to limitations of interview (see HPI)    Social History:  Education:Could not be assessed due to limitations of interview (see HPI)  Learning Disabilities: Could not be assessed due to limitations of interview (see HPI)  Marital history: Could not be assessed due to limitations of interview (see HPI)  Living arrangement, social support: Could not be assessed due to limitations of interview (see HPI)  Occupational History: Could not be assessed due to limitations of interview (see HPI)  Functioning Relationships: Could not be assessed due to limitations of interview (see HPI)  Other Pertinent History: Could not be assessed due to limitations of interview (see HPI)    Traumatic History:   Abuse:Could not be assessed due to limitations of interview (see HPI)  Other Traumatic Events:Could not be assessed due to limitations of interview (see HPI)    Past Medical History:   Diagnosis Date    Abnormal Pap smear of cervix     Anxiety     Depression     Endocarditis     2018    Hepatitis C     HPV (human papilloma virus) anogenital infection        Meds/Allergies   current meds:   Current Facility-Administered Medications   Medication Dose Route Frequency    calcium carbonate (TUMS) chewable tablet 1,000 mg  1,000 mg Oral Daily PRN    ceFAZolin (ANCEF) IVPB (premix) 2,000 mg 50 mL  2,000 mg Intravenous Q8H    docusate sodium (COLACE) capsule 100 mg  100 mg Oral BID PRN    ketorolac (TORADOL) injection 15 mg  15 mg Intravenous Q6H PRN    mirtazapine (REMERON) tablet 30 mg  30 mg Oral HS    ondansetron (ZOFRAN) injection 4 mg  4 mg Intravenous Q6H PRN    oxyCODONE-acetaminophen (PERCOCET) 5-325 mg per tablet 1 tablet  1 tablet Oral Q4H PRN    potassium chloride (K-DUR,KLOR-CON) CR tablet 40 mEq  40 mEq Oral TID With Meals     Allergies   Allergen Reactions    Latex     Pollen Extract        Objective   Vital signs in last 24 hours:  Temp:  [97 6 °F (36 4 °C)-98 3 °F (36 8 °C)] 97 6 °F (36 4 °C)  HR:  [] 99  Resp:  [18-20] 18  BP: (103-122)/(55-79) 103/55      Intake/Output Summary (Last 24 hours) at 8/11/2020 0901  Last data filed at 8/11/2020 0701  Gross per 24 hour   Intake 0 ml   Output 330 ml   Net -330 ml       Mental Status Evaluation:  Appearance:  age appropriate and partially covering face with blanket   Behavior:  psychomotor retardation and uncooperative   Speech:  paucity   Mood:  "bad"   Affect:  constricted and depressed    Language: Could not be assessed due to limitations of interview (see HPI)   Thought Process:  impoverished   Thought Content:  Could not be assessed due to limitations of interview (see HPI)   Perceptual Disturbances: Could not be assessed due to limitations of interview (see HPI)   Risk Potential: Could not be assessed due to limitations of interview (see HPI)   Sensorium:  Could not be assessed due to limitations of interview (see HPI)   Cognition:  Could not be assessed due to limitations of interview (see HPI)   Consciousness:  alert    Attention: preocuppied   Intellect: within normal limits   Fund of Knowledge: Could not be assessed due to limitations of interview (see HPI)   Insight:  poor   Judgment: poor   Muscle Strength and Tone: Could not be assessed due to limitations of interview (see HPI)   Gait/Station: Could not be assessed due to limitations of interview (see HPI)   Motor Activity: no abnormal movements           Laboratory results:   Component      Latest Ref Rng & Units 8/10/2020   WBC      4 31 - 10 16 Thousand/uL 5 82   Red Blood Cell Count      3 81 - 5 12 Million/uL 3 46 (L)   Hemoglobin      11 5 - 15 4 g/dL 9 2 (L)   HCT      34 8 - 46 1 % 28 2 (L)   MCV      82 - 98 fL 82   MCH      26 8 - 34 3 pg 26 6 (L)   MCHC      31 4 - 37 4 g/dL 32 6   RDW      11 6 - 15 1 % 14 8   MPV      8 9 - 12 7 fL 9 6   Platelet Count      547 - 390 Thousands/uL 322   Neutrophils %      43 - 75 % 87 (H)   Immat GRANS %      0 - 2 % 1   Lymphocytes Relative      14 - 44 % 8 (L)   Monocytes Relative      4 - 12 % 4   Eosinophils      0 - 6 % 0   Basophils Relative      0 - 1 % 0   Absolute Neutrophils      1 85 - 7 62 Thousands/µL 4 98   Immature Grans Absolute      0 00 - 0 20 Thousand/uL 0 07   Lymphocytes Absolute      0 60 - 4 47 Thousands/µL 0 49 (L)   Absolute Monocytes      0 17 - 1 22 Thousand/µL 0 25   Absolute Eosinophils      0 00 - 0 61 Thousand/µL 0 02   Basophils Absolute      0 00 - 0 10 Thousands/µL 0 01     Component      Latest Ref Rng & Units 8/10/2020   Sodium      133 - 145 mmol/L 134   Potassium      3 5 - 5 0 mmol/L 2 9 (L)   Chloride      96 - 108 mmol/L 96   CO2      22 - 33 mmol/L 26   Anion Gap      4 - 13 mmol/L 12   BUN      6 - 20 mg/dL 15   Creatinine      0 40 - 1 10 mg/dL 0 61 Glucose, Random      65 - 140 mg/dL 101   Calcium      8 4 - 10 2 mg/dL 8 6   AST      15 - 41 U/L 134 (H)   ALT      5 - 54 U/L 59 (H)   Alkaline Phosphatase      35 - 140 U/L 130 0   Total Protein      6 4 - 8 3 g/dL 6 9   Albumin      3 4 - 4 8 g/dL 3 0 (L)   TOTAL BILIRUBIN      0 30 - 1 20 mg/dL 0 82   eGFR      ml/min/1 73sq m 125       Imaging Studies:   CXR 8/10:  IMPRESSION: No acute cardiopulmonary disease  Persistent right lower lobe opacity which may be related to loculated pleural effusion        Radha Adanoswaldo   MS4   [phone]

## 2020-08-11 NOTE — ASSESSMENT & PLAN NOTE
Patient currently refusing some labs, vital signs, some medications, and consults  Today 8/11, consulted psych for capacity eval (she refused them)  Then consulted neuro-psych (pending)  Patient with history of MSSA bacteremia, [facility] and then [facility] on 07/22/2020, then got admitted at [facility] on 07/30/2020 , left AMA on 08/04/2020  Was not prescribed any p o  Antibiotics at the time of discharge as she wishes to get admitted at [facility]  Today she presented at [facility] Emergency Room due to pain  · MRI thoracolumbar spine 8/1/20 and there was no evidence of any abscess/septic emboli  · CT chest abdomen and pelvis 8/1/20: bilateral renal parenchymal abnormalities concerning for septic emboli   No intra-abdominal abscess       Plan:  · Continue IV cefazolin 2 g Q 8 hours  · ID following    Recs CT surgery evaluation  · Blood cultures: Gram positive cocci in clusters

## 2020-08-11 NOTE — ASSESSMENT & PLAN NOTE
Reports being off for psych medications  Plan:  · Psychiatry consultation  Patient refused  Patient refused second attempt    · Resume Remeron

## 2020-08-11 NOTE — PROGRESS NOTES
Patient continuously screaming at staff and threatening to sign out AMA if she does not get what she wants (IV pain medication)  SL[facility] thoroughly explained options and that they are going to try different things until her pain subsides  Patient refused all choices and kicked them out of the room

## 2020-08-11 NOTE — PROGRESS NOTES
After receiving scheduled tylenol, gabapentin and prn oxycodone, with ice applied to ankle, buttocks and back, patient is finally resting comfortably in bed

## 2020-08-11 NOTE — PROGRESS NOTES
Progress Note - Infectious Disease   Betty Awan 34 y o  female MRN: [id number]  Unit/Bed#: S -01 Encounter: [id number]      IMPRESSION & RECOMMENDATIONS:   Impression/Recommendations:  1  Recurrent/persistent MSSA bacteremia with TV endocarditis  Blood cultures from 07/15/2020 were positive  Prolonged course of IV antibiotic recommended, but patient left AMA  Again found to have MSSA bacteremia at [facility] on 07/30/2020, but patient again left AMA  I am obviously concerned for persistent bacteremia, as patient has not received adequate antibiotic course  Blood cultures are again positive  Fortunately, she remains afebrile and clinically stable      -continue IV cefazolin 2 g Q 8 hours  -follow up pending blood cultures  -recommend CT surgery evaluation  -monitor temperatures and hemodynamics  -monitor CBC     2  Tricuspid valve endocarditis, 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  Recent MELISSA showed large vegetation on TV with severe regurgitation  Recent CT abdomen/pelvis also showed bilateral renal parenchymal abnormalities concerning for septic emboli  No intra-abdominal abscess  Respiratory status remains stable with no hypoxia      -antibiotic plan as above  -recommend CT surgery evaluation  -may warrant IR evaluation for thoracentesis     3  Recent left foot cellulitis with abscess  Likely site of injection of IV drugs versus ectopic foci in the setting of MSSA bacteremia  This has appeared to heal with no clinical evidence of active infection      -antibiotic plan as above  -serial foot exams     4  Back pain  More likely secondary to chronic pain, opioid dependence  Consider infection in the setting of MSSA bacteremia  Thoracic and lumbar spine MRIs performed at [facility] were negative      -monitor back pain  -serial exams     5  Active IVDU  Risk factor for development of bacteremia  Unfortunately, patient continues to leave AMA    She is high risk for development of withdrawal and again leaving AMA     -consider evaluation by acute pain service  -patient is not a candidate for at home PICC line/IV antibiotics     6  Prior MSSA bacteremia/possible endocarditis in 2018  Patient had 1 of 2 blood cultures positive for MSSA on 2018  MELISSA at the time showed questionable density  Repeat MELISSA showed no obvious endocarditis  7  Chronic HCV infection  Recent HIV was negative      Antibiotics:  Cefazolin 2    Above plan was discussed with Dr Shea Ordoñez from Internal Medicine Service          Subjective:  Patient is currently resting comfortably  Still has pain but better controlled  No fevers  No hypoxia  No cough  Objective:  Vitals:  Temp:  [97 6 °F (36 4 °C)-98 3 °F (36 8 °C)] 97 6 °F (36 4 °C)  HR:  [] 115  Resp:  [18-20] 18  BP: (103-129)/(55-76) 129/76  SpO2:  [94 %-96 %] 96 %  Temp (24hrs), Av °F (36 7 °C), Min:97 6 °F (36 4 °C), Max:98 3 °F (36 8 °C)  Current: Temperature: 97 6 °F (36 4 °C)    Physical Exam:   General:  Resting comfortably, sleepy but arousable   HEENT:  Atraumatic normocephalic  Pulmonary:  Normal respiratory excursion without accessory muscle use  Abdomen:  Soft, nontender  Extremities:  No edema  Skin:  No rashes, multiple scabs on both feet with no evidence of cellulitis, draining wounds  No joint swelling  Neuro: Moves all extremities spontaneously    Lab Results:  I have personally reviewed pertinent labs    Results from last 7 days   Lab Units 08/10/20  0638   POTASSIUM mmol/L 2 9*   CHLORIDE mmol/L 96   CO2 mmol/L 26   BUN mg/dL 15   CREATININE mg/dL 0 61   EGFR ml/min/1 73sq m 125   CALCIUM mg/dL 8 6   AST U/L 134*   ALT U/L 59*   ALK PHOS U/L 130 0     Results from last 7 days   Lab Units 08/10/20  0639   WBC Thousand/uL 5 82   HEMOGLOBIN g/dL 9 2*   PLATELETS Thousands/uL 322     Results from last 7 days   Lab Units 08/10/20  0638 08/10/20  0546   GRAM STAIN RESULT  Gram positive cocci in clusters* Gram positive cocci in clusters*       Imaging Studies:   I have personally reviewed pertinent imaging study reports and images in PACS  EKG, Pathology, and Other Studies:   I have personally reviewed pertinent reports

## 2020-08-11 NOTE — UTILIZATION REVIEW
Notification of Inpatient Admission/Inpatient Authorization Request   This is a Notification of Inpatient Admission for [address]  Be advised that this patient was admitted to our facility under Inpatient Status  Contact Ivonne Acuna at [phone] for additional admission information  [facility] DEPT  DEDICATED -[phone]  Patient Name:   Sajan Love   YOB: 1990       [address]   [address]:   Vj Sellers  Tax ID: [id number]  NPI: [id number] Attending Provider/NPI:  Address:  Phone: Kvng Cronin, Anjana Kiser [[id number]]  Same as the facility  [phone]   Place of Service Code: 24 Place of Service Name:  [address] [place] [address]   Start Date: 8/10/20 1023     Discharge Date & Time: No discharge date for patient encounter  Type of Admission: Inpatient Status Discharge Disposition   (if discharged): [address] [place] [address]   Patient Diagnoses: Endocarditis [I38]     Orders: Admission Orders (From admission, onward)     Ordered        08/10/20 1123  Inpatient Admission  Once                    Assigned Utilization Review Contact: Ivonne Acuna  Utilization   Network Utilization Review Department  Phone: [phone]; Fax [phone]  Email: [email or web address]   ATTENTION PAYERS: Please call the assigned Utilization  directly with any questions or concerns ALL voicemails in the department are confidential  Send all requests for admission clinical reviews, approved or denied determinations and any other requests to dedicated fax number belonging to the campus where the patient is receiving treatment

## 2020-08-11 NOTE — PROGRESS NOTES
Progress Note - Pablo Mendez 1990, 34 y o  female MRN: [id number]    Unit/Bed#: S -01 Encounter: [id number]    Primary Care Provider: Jam Wylie PA-C   Date and time admitted to hospital: 8/10/2020 10:23 AM        * Bacteremia due to Staphylococcus aureus  Assessment & Plan  Patient currently refusing some labs, vital signs, some medications, and consults  Today 8/11, consulted psych for capacity eval (she refused them)  Then consulted neuro-psych (pending)  Patient with history of MSSA bacteremia, left against medical advice from [facility] and then [facility] on 07/22/2020, then got admitted at [facility] on 07/30/2020 , left AMA on 08/04/2020  Was not prescribed any p o  Antibiotics at the time of discharge as she wishes to get admitted at [facility]  Today she presented at [facility] Emergency Room due to pain  · MRI thoracolumbar spine 8/1/20 and there was no evidence of any abscess/septic emboli  · CT chest abdomen and pelvis 8/1/20: bilateral renal parenchymal abnormalities concerning for septic emboli   No intra-abdominal abscess       Plan:  · Continue IV cefazolin 2 g Q 8 hours  · ID following  Recs CT surgery evaluation  · Blood cultures: Gram positive cocci in clusters    Septic embolism [facility])  Assessment & Plan  Tricuspid valve endocarditis, 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   Recent MELISSA showed large vegetation on TV with severe regurgitation   Recent CT abdomen/pelvis also showed bilateral renal parenchymal abnormalities concerning for septic emboli   No intra-abdominal abscess   Respiratory status remains stable with no hypoxia  Septic emboli noted to lungs and kidney  Plan:  · Continue antibiotics coverage for MSSA  · Id consultation request  Recs CT surgery eval   · Follow repeat blood cultures    Heroin abuse ([facility] [address] )  Assessment & Plan  Does not appear to be withdrawing  Denies nausea, sweating    Received 5mg dilautic on day 1  Currently on pain regimen: ibuprofen (mild), oxycodone 5mg (moderate), oxycodone 10mg (severe)  Patient with history of IV drug abuse  Admits to using heroin about 3-4 days ago  Active IVDU  Patient requesting IV Dilaudid, discussed with patient will not be administering any IV narcotics  She reports that she has not been taking Suboxone anymore  Plan:   · On pain regimen: ibuprofen (mild), oxycodone 5mg (moderate), oxycodone 10mg (severe)  · Check UDS - patient refusing  · Continue to encourage cessation, will consult host closer to discharge time  Back pain  Assessment & Plan  Secondary to chronic pain, opioid dependence   Consider infection in the setting of MSSA bacteremia   Thoracic and lumbar spine MRIs performed at [facility]  Plan:    · See above pain management regimen  · Encourage movement  Bipolar 1 disorder [facility])  Assessment & Plan  Reports being off for psych medications  Plan:  · Psychiatry consultation  Patient refused  Patient refused second attempt  · Resume Remeron    Hypokalemia  Assessment & Plan  Unable to assess, patient refusing AM labs  Plan:  · Repleat potassium  Attempt to recheck BMP  Acute bacterial endocarditis  Assessment & Plan  · 2D echo on 07/21/2020: medium-sized, papillary, mobile vegetation, measuring 11 mm x 10 mm on the tip of the anterior leaflet, on the right atrial aspect  Will await ID input    Hyponatremia  Assessment & Plan  · Likely some dehydration  Encourage p o  Intake          VTE Pharmacologic Prophylaxis:   Pharmacologic: Pharmacologic VTE Prophylaxis contraindicated due to low VTE  Mechanical VTE Prophylaxis in Place: No    Discussions with Specialists or Other Care Team Provider:  Psychiatry, case management  Education and Discussions with Family / Patient:  Patient      Current Length of Stay: 1 day(s)    Current Patient Status: Inpatient     Discharge Plan / Estimated Discharge Date:  12-36 hours    Code Status: Level 1 - Full Code      Subjective: The patient also had her head under the covers, answering only to yes and no questions  Patient reports he has pain overnight  She denies fevers, chest pain, shortness of breath, nausea, vomiting  She endorses severe pain 10/10 the  Patient ended the interview in as interviewee to leave the room  1 minutes later, she refused vitals and asked technician to leave  Objective:     Vitals:   Temp (24hrs), Av 4 °F (36 9 °C), Min:97 6 °F (36 4 °C), Max:99 1 °F (37 3 °C)    Temp:  [97 6 °F (36 4 °C)-99 1 °F (37 3 °C)] 99 1 °F (37 3 °C)  HR:  [] 112  Resp:  [16-18] 16  BP: (103-129)/(55-76) 122/56  SpO2:  [96 %] 96 %  Body mass index is 23 3 kg/m²  Input and Output Summary (last 24 hours): Intake/Output Summary (Last 24 hours) at 2020 1526  Last data filed at 2020 1246  Gross per 24 hour   Intake 50 ml   Output 330 ml   Net -280 ml       Physical Exam:     Physical Exam  Vitals signs and nursing note reviewed  Constitutional:       General: She is not in acute distress  Appearance: She is well-developed  She is not diaphoretic  HENT:      Head: Normocephalic and atraumatic  Eyes:      General: No scleral icterus  Right eye: No discharge  Left eye: No discharge  Conjunctiva/sclera: Conjunctivae normal    Cardiovascular:      Rate and Rhythm: Normal rate and regular rhythm  Pulmonary:      Effort: Pulmonary effort is normal  No respiratory distress  Skin:     General: Skin is warm and dry  Neurological:      Mental Status: She is alert and oriented to person, place, and time     Psychiatric:      Comments: Patient irritable         Additional Data:     Labs:    Results from last 7 days   Lab Units 08/10/20  0639   WBC Thousand/uL 5 82   HEMOGLOBIN g/dL 9 2*   HEMATOCRIT % 28 2*   PLATELETS Thousands/uL 322   NEUTROS PCT % 87*   LYMPHS PCT % 8*   MONOS PCT % 4   EOS PCT % 0     Results from last 7 days   Lab Units 08/10/20  0638   POTASSIUM mmol/L 2 9*   CHLORIDE mmol/L 96   CO2 mmol/L 26   BUN mg/dL 15   CREATININE mg/dL 0 61   CALCIUM mg/dL 8 6   ALK PHOS U/L 130 0   ALT U/L 59*   AST U/L 134*     Results from last 7 days   Lab Units 08/10/20  0638   INR  1 20*       * I Have Reviewed All Lab Data Listed Above  * Additional Pertinent Lab Tests Reviewed: All Labs For Current Hospital Admission Reviewed    Imaging:    Imaging Reports Reviewed Today Include:  None  Imaging Personally Reviewed by Myself Includes:  None  Recent Cultures (last 7 days):     Results from last 7 days   Lab Units 08/10/20  0638 08/10/20  0546   GRAM STAIN RESULT  Gram positive cocci in clusters* Gram positive cocci in clusters*       Last 24 Hours Medication List:   Current Facility-Administered Medications   Medication Dose Route Frequency Provider Last Rate    acetaminophen  975 mg Oral Q8H Garland King MD      calcium carbonate  1,000 mg Oral Daily PRN Chantal Camejo MD      cefazolin  2,000 mg Intravenous Q8H Chantal Camejo MD Stopped (08/11/20 1246)    docusate sodium  100 mg Oral BID PRN Chantal Camejo MD      gabapentin  100 mg Oral TID Darleen Walker MD      ibuprofen  600 mg Oral Q6H PRN Darleen Walker MD      mirtazapine  30 mg Oral HS Chantal Camejo MD      ondansetron  4 mg Intravenous Q6H PRN Chantal Camejo MD      oxyCODONE  10 mg Oral Q4H PRN Darleen Walker MD      oxyCODONE  5 mg Oral Q4H PRN Darleen Walker MD          Today, Patient Was Seen By: Deb Cam MD    ** Please Note: This note has been constructed using a voice recognition system   **

## 2020-08-12 ENCOUNTER — HOSPITAL ENCOUNTER (INPATIENT)
Facility: HOSPITAL | Age: 30
LOS: 46 days | Discharge: HOME | DRG: 163 | End: 2020-09-27
Attending: FAMILY MEDICINE | Admitting: EMERGENCY MEDICINE
Payer: COMMERCIAL

## 2020-08-12 VITALS
HEIGHT: 65 IN | SYSTOLIC BLOOD PRESSURE: 119 MMHG | BODY MASS INDEX: 23.32 KG/M2 | TEMPERATURE: 99.5 F | WEIGHT: 140 LBS | DIASTOLIC BLOOD PRESSURE: 79 MMHG | HEART RATE: 114 BPM | OXYGEN SATURATION: 97 % | RESPIRATION RATE: 16 BRPM

## 2020-08-12 DIAGNOSIS — E55.9 VITAMIN D DEFICIENCY: ICD-10-CM

## 2020-08-12 DIAGNOSIS — B18.2 CHRONIC HEPATITIS C WITHOUT HEPATIC COMA (HCC): ICD-10-CM

## 2020-08-12 DIAGNOSIS — I33.0 ACUTE BACTERIAL ENDOCARDITIS: ICD-10-CM

## 2020-08-12 DIAGNOSIS — I82.A12 DVT OF AXILLARY VEIN, ACUTE LEFT (HCC): ICD-10-CM

## 2020-08-12 DIAGNOSIS — F11.10 HEROIN ABUSE (HCC): ICD-10-CM

## 2020-08-12 DIAGNOSIS — R79.89 LOW SERUM PARATHYROID HORMONE (PTH): ICD-10-CM

## 2020-08-12 DIAGNOSIS — F19.10 INTRAVENOUS DRUG ABUSE (HCC): ICD-10-CM

## 2020-08-12 DIAGNOSIS — K08.9 POOR DENTITION: ICD-10-CM

## 2020-08-12 DIAGNOSIS — E83.39 HYPERPHOSPHATEMIA: ICD-10-CM

## 2020-08-12 DIAGNOSIS — F31.9 BIPOLAR 1 DISORDER (HCC): ICD-10-CM

## 2020-08-12 DIAGNOSIS — Z95.4 S/P TVR (TRICUSPID VALVE REPLACEMENT): ICD-10-CM

## 2020-08-12 DIAGNOSIS — Z98.890 S/P TVR (TRICUSPID VALVE REPAIR): Primary | ICD-10-CM

## 2020-08-12 PROBLEM — E87.1 HYPONATREMIA: Status: RESOLVED | Noted: 2020-07-15 | Resolved: 2020-08-12

## 2020-08-12 PROCEDURE — 99232 SBSQ HOSP IP/OBS MODERATE 35: CPT | Performed by: INTERNAL MEDICINE

## 2020-08-12 PROCEDURE — 99239 HOSP IP/OBS DSCHRG MGMT >30: CPT | Performed by: INTERNAL MEDICINE

## 2020-08-12 PROCEDURE — 99233 SBSQ HOSP IP/OBS HIGH 50: CPT | Performed by: FAMILY MEDICINE

## 2020-08-12 RX ORDER — OXYCODONE HYDROCHLORIDE 5 MG/1
5 TABLET ORAL EVERY 4 HOURS PRN
Status: DISCONTINUED | OUTPATIENT
Start: 2020-08-12 | End: 2020-08-12

## 2020-08-12 RX ORDER — OXYCODONE HYDROCHLORIDE 10 MG/1
10 TABLET ORAL EVERY 4 HOURS PRN
Status: CANCELLED | OUTPATIENT
Start: 2020-08-12

## 2020-08-12 RX ORDER — CALCIUM CARBONATE 200(500)MG
1000 TABLET,CHEWABLE ORAL DAILY PRN
Status: DISCONTINUED | OUTPATIENT
Start: 2020-08-12 | End: 2020-09-28 | Stop reason: HOSPADM

## 2020-08-12 RX ORDER — GABAPENTIN 100 MG/1
100 CAPSULE ORAL 3 TIMES DAILY
Status: CANCELLED | OUTPATIENT
Start: 2020-08-12

## 2020-08-12 RX ORDER — ACETAMINOPHEN 325 MG/1
975 TABLET ORAL EVERY 8 HOURS SCHEDULED
Status: DISCONTINUED | OUTPATIENT
Start: 2020-08-12 | End: 2020-08-16

## 2020-08-12 RX ORDER — DOCUSATE SODIUM 100 MG/1
100 CAPSULE, LIQUID FILLED ORAL 2 TIMES DAILY PRN
Status: DISCONTINUED | OUTPATIENT
Start: 2020-08-12 | End: 2020-08-14

## 2020-08-12 RX ORDER — OXYCODONE HYDROCHLORIDE 5 MG/1
5 TABLET ORAL EVERY 4 HOURS PRN
Status: CANCELLED | OUTPATIENT
Start: 2020-08-12

## 2020-08-12 RX ORDER — HYDROMORPHONE HYDROCHLORIDE 2 MG/1
1 TABLET ORAL EVERY 4 HOURS PRN
Status: DISCONTINUED | OUTPATIENT
Start: 2020-08-12 | End: 2020-08-13

## 2020-08-12 RX ORDER — OXYCODONE HYDROCHLORIDE 10 MG/1
10 TABLET ORAL EVERY 4 HOURS PRN
Status: DISCONTINUED | OUTPATIENT
Start: 2020-08-12 | End: 2020-08-12

## 2020-08-12 RX ORDER — IBUPROFEN 600 MG/1
600 TABLET ORAL EVERY 6 HOURS PRN
Status: DISCONTINUED | OUTPATIENT
Start: 2020-08-12 | End: 2020-08-13

## 2020-08-12 RX ORDER — CEFAZOLIN SODIUM 2 G/50ML
2000 SOLUTION INTRAVENOUS EVERY 8 HOURS
Status: CANCELLED | OUTPATIENT
Start: 2020-08-12

## 2020-08-12 RX ORDER — ACETAMINOPHEN 325 MG/1
975 TABLET ORAL EVERY 8 HOURS SCHEDULED
Status: CANCELLED | OUTPATIENT
Start: 2020-08-12

## 2020-08-12 RX ORDER — ONDANSETRON 2 MG/ML
4 INJECTION INTRAMUSCULAR; INTRAVENOUS EVERY 6 HOURS PRN
Status: CANCELLED | OUTPATIENT
Start: 2020-08-12

## 2020-08-12 RX ORDER — ONDANSETRON 2 MG/ML
4 INJECTION INTRAMUSCULAR; INTRAVENOUS EVERY 6 HOURS PRN
Status: DISCONTINUED | OUTPATIENT
Start: 2020-08-12 | End: 2020-09-10 | Stop reason: SDUPTHER

## 2020-08-12 RX ORDER — DOCUSATE SODIUM 100 MG/1
100 CAPSULE, LIQUID FILLED ORAL 2 TIMES DAILY PRN
Status: CANCELLED | OUTPATIENT
Start: 2020-08-12

## 2020-08-12 RX ORDER — CEFAZOLIN SODIUM 2 G/50ML
2000 SOLUTION INTRAVENOUS EVERY 8 HOURS
Status: DISCONTINUED | OUTPATIENT
Start: 2020-08-12 | End: 2020-08-13

## 2020-08-12 RX ORDER — HYDROMORPHONE HYDROCHLORIDE 2 MG/1
2 TABLET ORAL EVERY 4 HOURS PRN
Status: DISCONTINUED | OUTPATIENT
Start: 2020-08-12 | End: 2020-08-13

## 2020-08-12 RX ORDER — MIRTAZAPINE 30 MG/1
30 TABLET, FILM COATED ORAL
Status: DISCONTINUED | OUTPATIENT
Start: 2020-08-12 | End: 2020-09-09

## 2020-08-12 RX ORDER — IBUPROFEN 600 MG/1
600 TABLET ORAL EVERY 6 HOURS PRN
Status: CANCELLED | OUTPATIENT
Start: 2020-08-12

## 2020-08-12 RX ORDER — CALCIUM CARBONATE 200(500)MG
1000 TABLET,CHEWABLE ORAL DAILY PRN
Status: CANCELLED | OUTPATIENT
Start: 2020-08-12

## 2020-08-12 RX ORDER — MIRTAZAPINE 15 MG/1
30 TABLET, FILM COATED ORAL
Status: CANCELLED | OUTPATIENT
Start: 2020-08-12

## 2020-08-12 RX ORDER — GABAPENTIN 100 MG/1
100 CAPSULE ORAL 3 TIMES DAILY
Status: DISCONTINUED | OUTPATIENT
Start: 2020-08-12 | End: 2020-08-19

## 2020-08-12 RX ADMIN — OXYCODONE HYDROCHLORIDE 10 MG: 10 TABLET ORAL at 20:12

## 2020-08-12 RX ADMIN — GABAPENTIN 100 MG: 100 CAPSULE ORAL at 16:32

## 2020-08-12 RX ADMIN — OXYCODONE HYDROCHLORIDE 10 MG: 10 TABLET ORAL at 16:32

## 2020-08-12 RX ADMIN — GABAPENTIN 100 MG: 100 CAPSULE ORAL at 20:12

## 2020-08-12 RX ADMIN — IBUPROFEN 600 MG: 600 TABLET, FILM COATED ORAL at 20:12

## 2020-08-12 RX ADMIN — CEFAZOLIN SODIUM 2000 MG: 2 SOLUTION INTRAVENOUS at 11:36

## 2020-08-12 RX ADMIN — GABAPENTIN 100 MG: 100 CAPSULE ORAL at 08:28

## 2020-08-12 RX ADMIN — HYDROMORPHONE HYDROCHLORIDE 2 MG: 2 TABLET ORAL at 23:35

## 2020-08-12 RX ADMIN — OXYCODONE HYDROCHLORIDE 10 MG: 10 TABLET ORAL at 00:54

## 2020-08-12 RX ADMIN — CEFAZOLIN SODIUM 2000 MG: 2 SOLUTION INTRAVENOUS at 05:31

## 2020-08-12 RX ADMIN — IBUPROFEN 600 MG: 600 TABLET ORAL at 08:28

## 2020-08-12 RX ADMIN — MIRTAZAPINE 30 MG: 30 TABLET, FILM COATED ORAL at 22:25

## 2020-08-12 RX ADMIN — CEFAZOLIN SODIUM 2000 MG: 2 SOLUTION INTRAVENOUS at 20:37

## 2020-08-12 RX ADMIN — OXYCODONE HYDROCHLORIDE 10 MG: 10 TABLET ORAL at 07:18

## 2020-08-12 RX ADMIN — OXYCODONE HYDROCHLORIDE 10 MG: 10 TABLET ORAL at 11:35

## 2020-08-12 NOTE — ASSESSMENT & PLAN NOTE
Does not appear to be withdrawing  Denies nausea, vomiting, sweating  Received 5mg dilautic on day 1  Currently on pain regimen: ibuprofen (mild), oxycodone 5mg (moderate), oxycodone 10mg (severe)  Patient with history of IV drug abuse  Admits to using heroin about 3-4 days ago  Active IVDU  Patient requesting IV Dilaudid, discussed with patient will not be administering any IV narcotics  She reports that she has not been taking Suboxone anymore  Patient is at high risk for development of withdrawal and again leaving AMA  Plan:   · Cont pain mgmt regimen  Avoid IV narcotics  · Check UDS - patient refusing  · Continue to encourage cessation, will consult host closer to discharge time

## 2020-08-12 NOTE — ASSESSMENT & PLAN NOTE
Secondary to chronic pain, opioid dependence   Consider infection in the setting of MSSA bacteremia   Thoracic and lumbar spine MRIs performed at [facility] neg  Plan:    · ont pain management regimen:m Currently on pain regimen: ibuprofen (mild), oxycodone 5mg (moderate), oxycodone 10mg (severe)  · Cont gabapentin  · Encourage movement

## 2020-08-12 NOTE — ASSESSMENT & PLAN NOTE
Tricuspid valve endocarditis, 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   Recent MELISSA showed large vegetation on TV with severe regurgitation   Recent CT abdomen/pelvis also showed bilateral renal parenchymal abnormalities concerning for septic emboli   No intra-abdominal abscess   Respiratory status remains stable with no hypoxia  Septic emboli noted to lungs and kidney  Plan:  · Continue antibiotics coverage for MSSA  · Id consultation recs appreciated   Recs CT surgery eval   · Follow repeat blood cultures tomorrow am 8/13

## 2020-08-12 NOTE — ASSESSMENT & PLAN NOTE
Reports being off for psych medications  Plan:  · Psychiatry consultation  Patient refused  Patient refused second attempt    · Continue Remeron

## 2020-08-12 NOTE — SOCIAL WORK
CM notified that patient is being transferred to [facility] for [facility]  CMN was completed and provided to primary nurse

## 2020-08-12 NOTE — ASSESSMENT & PLAN NOTE
Patient currently refusing some labs, vital signs, some medications, and consults  Today 8/11, consulted psych for capacity eval (she refused them)  Then consulted neuro-psych for capacity eval, she rejected them due to too much pain  Patient consistently tach this morning, afebrile  Patient with history of MSSA bacteremia, left against medical advice from [address] [place] [address] and [facility], then got admitted at [facility] on 07/30/2020 , left AMA on 08/04/2020  Was not prescribed any p o  Antibiotics at the time of discharge as she wishes to get admitted at [address] [place] [address]  Prior to admission, presented at [facility] Emergency Room due to pain  · MRI thoracolumbar spine 8/1/20 and there was no evidence of any abscess/septic emboli  · CT chest abdomen and pelvis 8/1/20: bilateral renal parenchymal abnormalities concerning for septic emboli   No intra-abdominal abscess       Plan:  · Recheck blood cultures x2 sets in a m  · Continue IV cefazolin 2 g Q 8 hours  · ID following  Recs CT surgery evaluation  Reached out to Dr Antoine Varela cardiac surg on call at [place]  Told us to transfer patient to [facility] Internal medicine at [place]  · Blood cultures: Gram positive cocci in clusters  · Case management - have care team tomorrow 8/13  · Ask patient if there is family member we can speak to, or a medical decision maker

## 2020-08-12 NOTE — PROGRESS NOTES
Progress Note - Infectious Disease   Claude Hernandez 34 y o  female MRN: [id number]  Unit/Bed#: S -01 Encounter: [id number]      IMPRESSION & RECOMMENDATIONS:   Impression/Recommendations:  1  Recurrent/persistent MSSA bacteremia with TV endocarditis   Blood cultures from 07/15/2020 were positive   Prolonged course of IV antibiotic recommended, but patient left AMA   Again found to have MSSA bacteremia at [facility] 07/30/2020, but patient again left AMA  Blood cultures are again positive      -continue IV cefazolin 2 g Q 8 hours  -recheck blood cultures x2 sets in a m   -recommend formal CT surgery evaluation  -monitor temperatures and hemodynamics  -monitor CBC     2  Tricuspid valve endocarditis, 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   Recent MELISSA showed large vegetation on TV with severe regurgitation   Recent CT abdomen/pelvis also showed bilateral renal parenchymal abnormalities concerning for septic emboli   No intra-abdominal abscess   Respiratory status remains stable with no hypoxia      -antibiotic plan as above  -recommend CT surgery evaluation  -may warrant IR evaluation for thoracentesis     3  Recent left foot cellulitis with abscess   Likely site of injection of IV drugs versus ectopic foci in the setting of MSSA bacteremia   This has appeared to heal with no clinical evidence of active infection      -antibiotic plan as above  -serial foot exams     4  Back pain   More likely secondary to chronic pain, opioid dependence   Consider infection in the setting of MSSA bacteremia   Thoracic and lumbar spine MRIs performed at [facility] [place] [facility] negative      -monitor back pain  -serial exams  -symptomatic management     5  Active IVDU   Risk factor for development of bacteremia   Unfortunately, patient continues to leave AMA  Amy Thomas is high risk for development of withdrawal and again leaving AMA       -consider evaluation by acute pain service  -patient is not a candidate for at home PICC line/IV antibiotics     6  Prior MSSA bacteremia/possible endocarditis in 2018  Patient had 1 of 2 blood cultures positive for MSSA on 2018   MELISSA at the time showed questionable density   Repeat MELISSA showed no obvious endocarditis       7  Chronic HCV infection   Recent HIV was negative  8  Fever  Secondary to persistent bacteremia  Fortunately, patient remains hemodynamically stable and nontoxic     -antibiotic plan as above  -monitor temperatures and hemodynamics  -monitor CBC  -supportive care     Antibiotics:  Cefazolin 3     Previously discussed above plan with Dr Griselda Fuchs from Internal Medicine Service          Subjective:  Patient continues to refuse evaluation by psych  She did have a fever overnight of 101  Now resting comfortably but does complain of uncontrolled pain  Objective:  Vitals:  Temp:  [99 1 °F (37 3 °C)-101 °F (38 3 °C)] 99 5 °F (37 5 °C)  HR:  [112-114] 114  Resp:  [16] 16  BP: (119-122)/(56-79) 119/79  SpO2:  [96 %-97 %] 97 %  Temp (24hrs), Av 8 °F (37 7 °C), Min:99 1 °F (37 3 °C), Max:101 °F (38 3 °C)  Current: Temperature: 99 5 °F (37 5 °C)    Physical Exam:   General:  No acute distress, resting comfortably  HEENT:  Atraumatic normocephalic  Pulmonary:  Normal respiratory excursion without accessory muscle use  Abdomen:  Soft, nontender  Extremities:  No edema  Skin:  Multiple scabs on both feet bilaterally  No cellulitis, draining wounds, joint swelling  Neuro: Moves all extremities spontaneously    Lab Results:  I have personally reviewed pertinent labs    Results from last 7 days   Lab Units 08/10/20  0638   POTASSIUM mmol/L 2 9*   CHLORIDE mmol/L 96   CO2 mmol/L 26   BUN mg/dL 15   CREATININE mg/dL 0 61   EGFR ml/min/1 73sq m 125   CALCIUM mg/dL 8 6   AST U/L 134*   ALT U/L 59*   ALK PHOS U/L 130 0     Results from last 7 days   Lab Units 08/10/20  0639   WBC Thousand/uL 5 82   HEMOGLOBIN g/dL 9 2*   PLATELETS Thousands/uL 322     Results from last 7 days   Lab Units 08/10/20  9119 08/10/20  0546   BLOOD CULTURE  Staphylococcus aureus* Staphylococcus aureus*   GRAM STAIN RESULT  Gram positive cocci in clusters* Gram positive cocci in clusters*       Imaging Studies:   I have personally reviewed pertinent imaging study reports and images in PACS  EKG, Pathology, and Other Studies:   I have personally reviewed pertinent reports

## 2020-08-12 NOTE — DISCHARGE SUMMARY
Discharge- Rhae Fothergill 1990, 34 y o  female MRN: [id number]    Unit/Bed#: S -01 Encounter: [id number]    Primary Care Provider: Ana María Cam PA-C   Date and time admitted to hospital: 8/10/2020 10:23 AM        * Bacteremia due to Staphylococcus aureus  Assessment & Plan  Patient currently refusing some labs, vital signs, some medications, and consults  Today 8/11, consulted psych for capacity eval (she refused them)  Then consulted neuro-psych for capacity eval, she rejected them due to too much pain  Patient consistently tach this morning, afebrile  Patient with history of MSSA bacteremia, left against medical advice from [facility] and then [facility] on 07/22/2020, then got admitted at [facility] on 07/30/2020 , left AMA on 08/04/2020  Was not prescribed any p o  Antibiotics at the time of discharge as she wishes to get admitted at [facility]  Prior to admission, presented at [facility] Emergency Room due to pain  · MRI thoracolumbar spine 8/1/20 and there was no evidence of any abscess/septic emboli  · CT chest abdomen and pelvis 8/1/20: bilateral renal parenchymal abnormalities concerning for septic emboli   No intra-abdominal abscess       Plan:  · Recheck blood cultures x2 sets in a m  · Continue IV cefazolin 2 g Q 8 hours  · ID following  Recs CT surgery evaluation  Reached out to Dr Allen Fernandes cardiac surg on call at [place]  Told us to transfer patient to [facility] Internal medicine at [place]  · Blood cultures: Gram positive cocci in clusters  · Case management - have care team tomorrow 8/13  · Ask patient if there is family member we can speak to, or a medical decision maker      Septic embolism (HCC)  Assessment & Plan  Tricuspid valve endocarditis, 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   Recent MELISSA showed large vegetation on TV with severe regurgitation   Recent CT abdomen/pelvis also showed bilateral renal parenchymal abnormalities concerning for septic emboli   No intra-abdominal abscess   Respiratory status remains stable with no hypoxia  Septic emboli noted to lungs and kidney  Plan:  · Continue antibiotics coverage for MSSA  · Id consultation recs appreciated  Recs CT surgery eval   · Follow repeat blood cultures tomorrow am 8/13    Heroin abuse ([facility] [address] )  Assessment & Plan  Does not appear to be withdrawing  Denies nausea, vomiting, sweating  Received 5mg dilautic on day 1  Currently on pain regimen: ibuprofen (mild), oxycodone 5mg (moderate), oxycodone 10mg (severe)  Patient with history of IV drug abuse  Admits to using heroin about 3-4 days ago  Active IVDU  Patient requesting IV Dilaudid, discussed with patient will not be administering any IV narcotics  She reports that she has not been taking Suboxone anymore  Patient is at high risk for development of withdrawal and again leaving AMA  Plan:   · Cont pain mgmt regimen  Avoid IV narcotics  · Check UDS - patient refusing  · Continue to encourage cessation, will consult host closer to discharge time  Back pain  Assessment & Plan  Secondary to chronic pain, opioid dependence   Consider infection in the setting of MSSA bacteremia   Thoracic and lumbar spine MRIs performed at [facility] neg  Plan:    · ont pain management regimen:m Currently on pain regimen: ibuprofen (mild), oxycodone 5mg (moderate), oxycodone 10mg (severe)  · Cont gabapentin  · Encourage movement  Bipolar 1 disorder [facility])  Assessment & Plan  Reports being off for psych medications  Plan:  · Psychiatry consultation  Patient refused  Patient refused second attempt  · Continue Remeron    Hypokalemia  Assessment & Plan  Unable to assess, patient refusing AM labs  Plan:  · Repleat potassium  Attempt to recheck BMP      Acute bacterial endocarditis  Assessment & Plan  · 2D echo on 07/21/2020: medium-sized, papillary, mobile vegetation, measuring 11 mm x 10 mm on the tip of the anterior leaflet, on the right atrial aspect  Will await ID input    Hyponatremia  Assessment & Plan  · Likely some dehydration  Encourage p o  Intake    Discharging Resident Physician: Armando Mckinney MD  Attending: Lance Cotto MD  PCP: Christopher Cantu PA-C  Admission Date: 8/10/2020  Discharge Date: 08/12/20    Disposition:     Other: Carlus Parcel    Reason for Admission: None  Consultations During Hospital Stay:  · Psychiatry, Neuro-psychiatry, Infectious disease    Procedures Performed:     · None  Significant Findings / Test Results:     · XR chest 1 view portable: No acute cardiopulmonary disease   Persistent right lower lobe opacity which may be related to loculated pleural effusion  Incidental Findings:   · None  Test Results Pending at Discharge (will require follow up): · None  Outpatient Tests Requested:  · None  Complications:  None  Hospital Course:     Gian Weesm is a 34 y o  female patient who originally presented to the hospital on 8/10/2020 due to back pain/pain all over  Prior to admission here, patient was admitted [address] [place] [address] on 7/15/2020, she was noted to have MSSA bacteremia  She signed out [place] on 7/22/2020  Then subsequently was admitted at [facility], from 7/31/2020 to  8/4/2020, then again left AMA  She was not on any p o  Antibiotics  MRI thoracolumbar spine 8/1/20 and there was no evidence of any abscess/septic emboli  CT chest abdomen and pelvis 8/1/20: bilateral renal parenchymal abnormalities concerning for septic emboli   No intra-abdominal abscess   She [facility] today complaining of pain all over  Admits to using heroin about 3-4 days ago  During hospital admission, patient remained consistently tacky into 110s, but afebrile  Blood culture showed Gram positive cocci in clusters  Patient started on IV cefazolin 2 g Q 8 hours    Patient also complained of pain all over and requested IV narcotics, as it was the only thing that helps her  After the first night, patient placed on pain regimen: ibuprofen (mild), oxycodone 5mg (moderate), oxycodone 10mg (severe)  Also gabapentin started  During hospital stay, patient refusing some labs, vital signs, some medications, and consults  Today 8/11, consulted psych for capacity eval (she refused them)  Then consulted neuro-psych for capacity eval, she rejected them due to too much pain  On 8/12, infectious disease recommended CT surgery evaluation  Reached out to Dr Deepa Chan cardiac surgery on call at [facility]  He asked us to transfer patient to [facility] Internal medicine at [place]  Condition at Discharge: fair, but difficult to assess because patient refused labs, vitals, exams, consults     Discharge Day Visit / Exam:     Subjective:  Patient was under cover and responded to yes/no questions  Denied fevers, chest pain, shortness of breath, nausea, vomiting, abdominal pain  She does not feel ill, but endorses sever back pain that is not controlled with pain medications  She endorsed not getting sleep, but was starting to fall asleep this morning  She denied having any sensation of withdrawal or cravings  Vitals: Blood Pressure: 119/79 (08/12/20 0718)  Pulse: (!) 114 (08/12/20 0718)  Temperature: 99 5 °F (37 5 °C) (08/12/20 0253)  Temp Source: Oral (08/11/20 2300)  Respirations: 16 (08/11/20 2300)  Height: 5' 5" (165 1 cm) (08/10/20 1346)  Weight - Scale: 63 5 kg (140 lb) (08/10/20 1346)  SpO2: 97 % (08/11/20 2300)  Exam:   Physical Exam  Vitals signs and nursing note reviewed  Constitutional:       General: She is not in acute distress  Appearance: Normal appearance  She is well-developed  She is not diaphoretic  HENT:      Head: Normocephalic and atraumatic  Eyes:      General: No scleral icterus  Right eye: No discharge  Left eye: No discharge  Conjunctiva/sclera: Conjunctivae normal    Cardiovascular:      Rate and Rhythm: Normal rate and regular rhythm  Pulmonary:      Effort: Pulmonary effort is normal  No respiratory distress  Skin:     General: Skin is warm and dry  Neurological:      Mental Status: She is alert and oriented to person, place, and time  Psychiatric:      Comments: Patient irritated about being in pain, under covers in dark room  Discussion with Family:  Did not speak to patients family  Discharge instructions/Information to patient and family:   See after visit summary for information provided to patient and family  Provisions for Follow-Up Care:  See after visit summary for information related to follow-up care and any pertinent home health orders  Planned Readmission: None  Discharge Medications:  See after visit summary for reconciled discharge medications provided to patient and family        ** Please Note: This note has been constructed using a voice recognition system **

## 2020-08-12 NOTE — CONSULTS
Consultation - Neuropsychology/Psychology [address] [facility] [place] 34 y o  female MRN: [id number]  Unit/Bed#: S -01 Encounter: [id number]        Reason for Consultation:  Kaylee Serrano is a 34y o  year old female who was referred for a Neuropsychological exam to assess cognitive functioning and comment on capacity  to make informed medical decisions  NOTE; PATIENT REFUSED TO COOPERATE STATING SHE WAS IN TOO MUCH PAIN  WILL CONTINUE TO FOLLOW        History of Present Illness   Physician Requesting Consult: Jarad Alvarez MD    PROBLEM LIST:  Patient Active Problem List   Diagnosis    40 weeks gestation of pregnancy    Spontaneous vaginal delivery    Acute pyelonephritis    Hyponatremia    Loculated pleural effusion    [facility]in abuse ([facility] [address] )    Bacteremia due to Staphylococcus aureus    Abscess of left foot    Insomnia    Acute bacterial endocarditis    Septic embolism (HCC)    Hypokalemia    [facility]    Back pain         Historical Information   Past Medical History:   Diagnosis Date    Abnormal Pap smear of cervix     Anxiety     Depression     Endocarditis     2018    Hepatitis C     HPV (human papilloma virus) anogenital infection      Past Surgical History:   Procedure Laterality Date    KNEE SURGERY Left     MOUTH SURGERY       Social History   Social History     Substance and Sexual Activity   Alcohol Use No     Social History     Substance and Sexual Activity   Drug Use Yes    Types: Heroin    Comment: last use - one week ago     Social History     Tobacco Use   Smoking Status Former Smoker    Packs/day: 0 25    Types: Cigarettes    Last attempt to quit: 11/9/2016    Years since quitting: 3 7   Smokeless Tobacco Never Used   Tobacco Comment    current everyday smoker per [place]     Family History: No family history on file      Meds/Allergies   current meds:   Current Facility-Administered Medications   Medication Dose Route Frequency  acetaminophen (TYLENOL) tablet 975 mg  975 mg Oral Q8H [address]    calcium carbonate (TUMS) chewable tablet 1,000 mg  1,000 mg Oral Daily PRN    ceFAZolin (ANCEF) IVPB (premix) 2,000 mg 50 mL  2,000 mg Intravenous Q8H    docusate sodium (COLACE) capsule 100 mg  100 mg Oral BID PRN    gabapentin (NEURONTIN) capsule 100 mg  100 mg Oral TID    ibuprofen (MOTRIN) tablet 600 mg  600 mg Oral Q6H PRN    mirtazapine (REMERON) tablet 30 mg  30 mg Oral HS    ondansetron (ZOFRAN) injection 4 mg  4 mg Intravenous Q6H PRN    oxyCODONE (ROXICODONE) immediate release tablet 10 mg  10 mg Oral Q4H PRN    oxyCODONE (ROXICODONE) IR tablet 5 mg  5 mg Oral Q4H PRN       Allergies   Allergen Reactions    Latex     Pollen Extract          Family and Social Support:   No data recorded

## 2020-08-13 LAB
ANION GAP SERPL CALCULATED.3IONS-SCNC: 8 MMOL/L (ref 4–13)
BACTERIA BLD CULT: ABNORMAL
BASOPHILS # BLD AUTO: 0.03 THOUSANDS/ΜL (ref 0–0.1)
BASOPHILS NFR BLD AUTO: 0 % (ref 0–1)
BUN SERPL-MCNC: 10 MG/DL (ref 5–25)
CALCIUM SERPL-MCNC: 7.9 MG/DL (ref 8.3–10.1)
CHLORIDE SERPL-SCNC: 103 MMOL/L (ref 100–108)
CO2 SERPL-SCNC: 28 MMOL/L (ref 21–32)
CREAT SERPL-MCNC: 0.4 MG/DL (ref 0.6–1.3)
EOSINOPHIL # BLD AUTO: 0.21 THOUSAND/ΜL (ref 0–0.61)
EOSINOPHIL NFR BLD AUTO: 2 % (ref 0–6)
ERYTHROCYTE [DISTWIDTH] IN BLOOD BY AUTOMATED COUNT: 15.1 % (ref 11.6–15.1)
ERYTHROCYTE [SEDIMENTATION RATE] IN BLOOD: 82 MM/HOUR (ref 0–19)
GFR SERPL CREATININE-BSD FRML MDRD: 143 ML/MIN/1.73SQ M
GLUCOSE SERPL-MCNC: 108 MG/DL (ref 65–140)
GRAM STN SPEC: ABNORMAL
GRAM STN SPEC: ABNORMAL
HCT VFR BLD AUTO: 23.5 % (ref 34.8–46.1)
HGB BLD-MCNC: 7.5 G/DL (ref 11.5–15.4)
IMM GRANULOCYTES # BLD AUTO: 0.14 THOUSAND/UL (ref 0–0.2)
IMM GRANULOCYTES NFR BLD AUTO: 1 % (ref 0–2)
LYMPHOCYTES # BLD AUTO: 2.93 THOUSANDS/ΜL (ref 0.6–4.47)
LYMPHOCYTES NFR BLD AUTO: 29 % (ref 14–44)
MAGNESIUM SERPL-MCNC: 2.1 MG/DL (ref 1.6–2.6)
MCH RBC QN AUTO: 26.5 PG (ref 26.8–34.3)
MCHC RBC AUTO-ENTMCNC: 31.9 G/DL (ref 31.4–37.4)
MCV RBC AUTO: 83 FL (ref 82–98)
MONOCYTES # BLD AUTO: 0.79 THOUSAND/ΜL (ref 0.17–1.22)
MONOCYTES NFR BLD AUTO: 8 % (ref 4–12)
NEUTROPHILS # BLD AUTO: 5.87 THOUSANDS/ΜL (ref 1.85–7.62)
NEUTS SEG NFR BLD AUTO: 60 % (ref 43–75)
NRBC BLD AUTO-RTO: 0 /100 WBCS
PLATELET # BLD AUTO: 372 THOUSANDS/UL (ref 149–390)
PMV BLD AUTO: 9.7 FL (ref 8.9–12.7)
POTASSIUM SERPL-SCNC: 3.1 MMOL/L (ref 3.5–5.3)
RBC # BLD AUTO: 2.83 MILLION/UL (ref 3.81–5.12)
SODIUM SERPL-SCNC: 139 MMOL/L (ref 136–145)
WBC # BLD AUTO: 9.97 THOUSAND/UL (ref 4.31–10.16)

## 2020-08-13 PROCEDURE — 99233 SBSQ HOSP IP/OBS HIGH 50: CPT | Performed by: NURSE PRACTITIONER

## 2020-08-13 PROCEDURE — 85025 COMPLETE CBC W/AUTO DIFF WBC: CPT | Performed by: FAMILY MEDICINE

## 2020-08-13 PROCEDURE — 80048 BASIC METABOLIC PNL TOTAL CA: CPT | Performed by: FAMILY MEDICINE

## 2020-08-13 PROCEDURE — 83735 ASSAY OF MAGNESIUM: CPT | Performed by: FAMILY MEDICINE

## 2020-08-13 PROCEDURE — 99254 IP/OBS CNSLTJ NEW/EST MOD 60: CPT | Performed by: INTERNAL MEDICINE

## 2020-08-13 PROCEDURE — 85652 RBC SED RATE AUTOMATED: CPT | Performed by: FAMILY MEDICINE

## 2020-08-13 PROCEDURE — 99254 IP/OBS CNSLTJ NEW/EST MOD 60: CPT | Performed by: PHYSICIAN ASSISTANT

## 2020-08-13 PROCEDURE — 87147 CULTURE TYPE IMMUNOLOGIC: CPT | Performed by: FAMILY MEDICINE

## 2020-08-13 PROCEDURE — 99254 IP/OBS CNSLTJ NEW/EST MOD 60: CPT | Performed by: THORACIC SURGERY (CARDIOTHORACIC VASCULAR SURGERY)

## 2020-08-13 PROCEDURE — 87040 BLOOD CULTURE FOR BACTERIA: CPT | Performed by: FAMILY MEDICINE

## 2020-08-13 RX ORDER — KETOROLAC TROMETHAMINE 30 MG/ML
30 INJECTION, SOLUTION INTRAMUSCULAR; INTRAVENOUS EVERY 6 HOURS SCHEDULED
Status: DISPENSED | OUTPATIENT
Start: 2020-08-13 | End: 2020-08-18

## 2020-08-13 RX ORDER — MORPHINE SULFATE 4 MG/ML
4 INJECTION, SOLUTION INTRAMUSCULAR; INTRAVENOUS ONCE
Status: COMPLETED | OUTPATIENT
Start: 2020-08-13 | End: 2020-08-13

## 2020-08-13 RX ORDER — MORPHINE SULFATE 4 MG/ML
4 INJECTION, SOLUTION INTRAMUSCULAR; INTRAVENOUS EVERY 2 HOUR PRN
Status: DISCONTINUED | OUTPATIENT
Start: 2020-08-13 | End: 2020-08-19

## 2020-08-13 RX ORDER — LORAZEPAM 2 MG/ML
0.5 INJECTION INTRAMUSCULAR ONCE
Status: COMPLETED | OUTPATIENT
Start: 2020-08-13 | End: 2020-08-13

## 2020-08-13 RX ORDER — LIDOCAINE 50 MG/G
2 PATCH TOPICAL DAILY
Status: DISCONTINUED | OUTPATIENT
Start: 2020-08-13 | End: 2020-09-28 | Stop reason: HOSPADM

## 2020-08-13 RX ORDER — CEFAZOLIN SODIUM 2 G/50ML
2000 SOLUTION INTRAVENOUS EVERY 8 HOURS
Status: DISCONTINUED | OUTPATIENT
Start: 2020-08-13 | End: 2020-09-10 | Stop reason: SDUPTHER

## 2020-08-13 RX ORDER — ONDANSETRON 4 MG/1
4 TABLET, ORALLY DISINTEGRATING ORAL ONCE
Status: COMPLETED | OUTPATIENT
Start: 2020-08-13 | End: 2020-08-13

## 2020-08-13 RX ORDER — LORAZEPAM 2 MG/ML
1 INJECTION INTRAMUSCULAR
Status: DISCONTINUED | OUTPATIENT
Start: 2020-08-13 | End: 2020-08-22

## 2020-08-13 RX ORDER — OXYCODONE HYDROCHLORIDE 10 MG/1
10 TABLET ORAL ONCE
Status: COMPLETED | OUTPATIENT
Start: 2020-08-13 | End: 2020-08-13

## 2020-08-13 RX ORDER — OXYCODONE HYDROCHLORIDE 5 MG/1
5 TABLET ORAL EVERY 4 HOURS PRN
Status: DISCONTINUED | OUTPATIENT
Start: 2020-08-13 | End: 2020-08-13

## 2020-08-13 RX ORDER — OXYCODONE HYDROCHLORIDE 10 MG/1
10 TABLET ORAL EVERY 4 HOURS PRN
Status: DISCONTINUED | OUTPATIENT
Start: 2020-08-13 | End: 2020-09-09

## 2020-08-13 RX ORDER — GLYCOPYRROLATE 0.2 MG/ML
0.2 INJECTION INTRAMUSCULAR; INTRAVENOUS EVERY 4 HOURS PRN
Status: DISCONTINUED | OUTPATIENT
Start: 2020-08-13 | End: 2020-08-25

## 2020-08-13 RX ORDER — HALOPERIDOL 5 MG/ML
2 INJECTION INTRAMUSCULAR
Status: DISCONTINUED | OUTPATIENT
Start: 2020-08-13 | End: 2020-08-26

## 2020-08-13 RX ORDER — CEPHALEXIN 500 MG/1
500 CAPSULE ORAL ONCE
Status: DISCONTINUED | OUTPATIENT
Start: 2020-08-13 | End: 2020-08-13

## 2020-08-13 RX ORDER — POTASSIUM CHLORIDE 20 MEQ/1
40 TABLET, EXTENDED RELEASE ORAL ONCE
Status: COMPLETED | OUTPATIENT
Start: 2020-08-13 | End: 2020-08-13

## 2020-08-13 RX ORDER — METHOCARBAMOL 500 MG/1
500 TABLET, FILM COATED ORAL EVERY 6 HOURS SCHEDULED
Status: DISCONTINUED | OUTPATIENT
Start: 2020-08-13 | End: 2020-08-21

## 2020-08-13 RX ORDER — OXYCODONE HYDROCHLORIDE 10 MG/1
10 TABLET ORAL EVERY 4 HOURS PRN
Status: DISCONTINUED | OUTPATIENT
Start: 2020-08-13 | End: 2020-08-13

## 2020-08-13 RX ADMIN — KETOROLAC TROMETHAMINE 30 MG: 30 INJECTION, SOLUTION INTRAMUSCULAR at 23:05

## 2020-08-13 RX ADMIN — OXYCODONE HYDROCHLORIDE 15 MG: 10 TABLET ORAL at 18:23

## 2020-08-13 RX ADMIN — METHOCARBAMOL 500 MG: 500 TABLET, FILM COATED ORAL at 09:07

## 2020-08-13 RX ADMIN — CEFAZOLIN SODIUM 2000 MG: 2 SOLUTION INTRAVENOUS at 16:08

## 2020-08-13 RX ADMIN — MORPHINE SULFATE 4 MG: 4 INJECTION INTRAVENOUS at 10:47

## 2020-08-13 RX ADMIN — OXYCODONE HYDROCHLORIDE 10 MG: 10 TABLET ORAL at 01:37

## 2020-08-13 RX ADMIN — LORAZEPAM 0.5 MG: 2 INJECTION INTRAMUSCULAR; INTRAVENOUS at 05:16

## 2020-08-13 RX ADMIN — OXYCODONE HYDROCHLORIDE 15 MG: 10 TABLET ORAL at 09:21

## 2020-08-13 RX ADMIN — OXYCODONE HYDROCHLORIDE 15 MG: 10 TABLET ORAL at 13:33

## 2020-08-13 RX ADMIN — KETOROLAC TROMETHAMINE 30 MG: 30 INJECTION, SOLUTION INTRAMUSCULAR at 17:44

## 2020-08-13 RX ADMIN — GABAPENTIN 100 MG: 100 CAPSULE ORAL at 21:08

## 2020-08-13 RX ADMIN — METHOCARBAMOL 500 MG: 500 TABLET, FILM COATED ORAL at 23:05

## 2020-08-13 RX ADMIN — CEFAZOLIN SODIUM 2000 MG: 2 SOLUTION INTRAVENOUS at 23:05

## 2020-08-13 RX ADMIN — MORPHINE SULFATE 4 MG: 4 INJECTION INTRAVENOUS at 09:08

## 2020-08-13 RX ADMIN — MIRTAZAPINE 30 MG: 30 TABLET, FILM COATED ORAL at 21:08

## 2020-08-13 RX ADMIN — MORPHINE SULFATE 4 MG: 4 INJECTION INTRAVENOUS at 14:46

## 2020-08-13 RX ADMIN — CEFAZOLIN SODIUM 2000 MG: 2 SOLUTION INTRAVENOUS at 09:08

## 2020-08-13 RX ADMIN — POTASSIUM CHLORIDE 40 MEQ: 1500 TABLET, EXTENDED RELEASE ORAL at 09:07

## 2020-08-13 RX ADMIN — GABAPENTIN 100 MG: 100 CAPSULE ORAL at 16:08

## 2020-08-13 RX ADMIN — MORPHINE SULFATE 4 MG: 4 INJECTION INTRAVENOUS at 21:11

## 2020-08-13 RX ADMIN — ONDANSETRON 4 MG: 4 TABLET, ORALLY DISINTEGRATING ORAL at 02:47

## 2020-08-13 RX ADMIN — IBUPROFEN 600 MG: 600 TABLET, FILM COATED ORAL at 02:17

## 2020-08-13 RX ADMIN — OXYCODONE HYDROCHLORIDE 10 MG: 10 TABLET ORAL at 05:29

## 2020-08-13 RX ADMIN — KETOROLAC TROMETHAMINE 30 MG: 30 INJECTION, SOLUTION INTRAMUSCULAR at 11:52

## 2020-08-13 RX ADMIN — GABAPENTIN 100 MG: 100 CAPSULE ORAL at 09:07

## 2020-08-13 RX ADMIN — METHOCARBAMOL 500 MG: 500 TABLET, FILM COATED ORAL at 17:44

## 2020-08-13 NOTE — CONSULTS
Consultation - Acute Pain Service   Gracie Stevens 34 y o  female MRN: [id number]  Unit/Bed#: [facility] 425-01 Encounter: [id number]               Assessment/Plan     Assessment:   Patient Active Problem List   Diagnosis    40 weeks gestation of pregnancy    Spontaneous vaginal delivery    Acute pyelonephritis    Loculated pleural effusion    Heroin abuse ([facility] )    Bacteremia due to Staphylococcus aureus    Abscess of left foot    Insomnia    Acute bacterial endocarditis    Septic embolism (HCC)    Hypokalemia    Bipolar 1 disorder (HCC)    Back pain    Intravenous drug abuse ([facility] )        Plan:   · Continue acetaminophen 975 mg p o  q 8 hours scheduled although patient is refusing  · Suggest increase oxycodone to 10 mg p o  q 4 hours p r n  moderate pain  · Suggest increase oxycodone to 15 mg p o  q 4 hours p r n  severe pain  · Suggest morphine 4 mg IV q 2 hours p r n  breakthrough pain (patient states hydromorphone has been ineffective)  · Suggest start methocarbamol 500 mg p o  q 6 hours scheduled  · Continue lidocaine 5% patch for 12 hours daily  · Continue gabapentin 100 mg p o  t i d  scheduled  · Continue Colace, consider add senna to avoid opioid induced constipation  · Will start ketamine infusion at 0 1mg/kg/hr  · Monitor closely for worsening withdrawal symptoms, current COWS score 5 currently  APS will continue to follow; please contact APS ( [phone]) with any further questions    History of Present Illness    Admit Date:  8/12/2020  Hospital Day:  1 day  Primary Service:  Hospitalist  Attending Provider:  Kathi Cisneros MD  Reason for Consult / Principal Problem:  Right hip pain, opioid use disorder  HPI: Lj Mendoza is a 34y o  year old female who presents with acute right hip pain  Patient states she had Iron rapid insidious onset of right hip pain extending into the right leg starting approximately 5 days ago    She was initially admitted [facility] after being seen in the emergency department with lower back pain  Patient has a long history of IV in nasal heroin use as well as use of cocaine and states she has her medical marijuana card but has not used that recently  States her heroin use most recently has been a bundle" and has been using it nasally as she is unable to find an adequate vein for IV injection  States her last use was just prior to admission on 8/10/20  She admits to numbness in the right lower extremity without tingling  She also complains of bilateral lower extremity weakness however is able to ambulate  Currently, her pain is at 10/10  She denies any trauma or known injury to the back or right hip and denies previous episodes similar to this  Patient has had a recent CT of the chest, abdomen and pelvis which showed no acute abnormalities consistent with her pain currently  She also underwent an MRI of her thoracic and lumbar spine on 8/1/20 which showed no significant abnormalities consistent with her current pain  Patient has been incompletely treated recently for bacteremia and acute endocarditis with MSSA  She has a history of being admitted to the hospital, beginning treatment with antibiotics and signing out AMA or eloping  She has had an echocardiogram recently showing tricuspid vegetation and blood cultures on 8/10/20 show Staph aureus  Current pain location(s):  Right hip  Pain Scale:   10/10  Quality:  Sharp, burning  Current Analgesic regimen:    Acetaminophen 975 mg p o  q 8 hours scheduled  Oxycodone 5 mg p o  q 4 hours p r n  moderate pain  Oxycodone 10 mg p o  q 4 hours p r n  severe pain  Ibuprofen 600 mg p o  Q 6 hours p r n  mild pain  Gabapentin 100 mg p o  t i d  scheduled        Pain History:  No history of chronic pain  Pain Management Provider:  No outpatient pain management provider    I have reviewed the patient's controlled substance dispensing history in the Prescription Drug Monitoring Program in compliance with the [facility] regulations before prescribing any controlled substances  Past 1 year review of PDMP:  Fill Date ID   Written Drug Qty Days Prescriber Rx # Pharmacy Refill   Daily Dose* Pymt Type      04/07/2020  1   04/07/2020  Buprenorp-Nalox 8-2 MG SL Film  10 00 5 Ab Let   [id number]   Pen (7562)   0  16 [place]   03/16/2020  1   03/11/2020  Buprenorp-Nalox 8-2 MG SL Film  28 00 14 Er Smi   [id number]   Pen (4835)   0  16 [place]   03/12/2020  4   12/30/2019  Sublocade 300 Mg/1 5 Ml Syring  1 00 30 Ab Let   [id number]   Acc (1167)   1  10 00 mg  [place]   01/20/2020  4   12/30/2019  Sublocade 300 Mg/1 5 Ml Syring  1 00 30 Ab Let   [id number]   Acc (1167)   0  10 00 mg  [place]   01/09/2020  2   01/09/2020  Buprenorp-Nalox 8-2 MG SL Film  28 00 14 Ti [place]   [id number]   Rit (2955)   0  16 00 mg  [place]   12/20/2019  3   12/18/2019  Buprenorp-Nalox 8-2 MG SL Film  28 00 14 Se Sto   [id number]   Yoana ([id number])   0  16 00 mg  [place]   12/19/2019  3   12/18/2019  Buprenorp-Nalox 8-2 MG SL Film  4 00 2 Se Sto   [id number]   Yoana ([id number])   0  16 00 mg  [place]   09/23/2019  1   09/23/2019  Alprazolam 1 MG Tablet  30 00 30 Karlos Grantchristi   [id number]   Pen (8188)   0   [place]   09/13/2019  3   09/13/2019  Alprazolam 0 5 MG Tablet  12 00 4 Ma Pavel   [id number]   Eas (0143)   0   [place]   09/10/2019  3   09/10/2019  Clonazepam 1 MG Tablet  21 00 7 Ka You   [id number]   Wal (7989)   0   [place]   09/03/2019  3   09/01/2019  Clonazepam 1 MG Tablet  7 00 7 Al Ros   [id number]   Yoana (0850)   [place]   [place]   09/01/2019  1   09/01/2019  Buprenorp-Nalox 8-2 MG SL Film  14 00 7 Ke Guerline   [id number]   Hea ([id number])   0  16 00 mg  [place]         Inpatient consult to Acute Pain Service  Consult performed by: Delfino Vela PA-C  Consult ordered by: Emery Stuart MD          Review of Systems   Constitutional: Negative for chills, diaphoresis and fever  HENT: Positive for rhinorrhea  Negative for congestion and drooling  Eyes: Negative  Respiratory: Negative  Cardiovascular: Negative  Gastrointestinal: Negative  Genitourinary: Negative  Musculoskeletal: Positive for arthralgias, back pain and gait problem  Negative for myalgias and neck pain  Neurological: Positive for numbness  Negative for tremors, speech difficulty, weakness and headaches  Hematological: Negative  Psychiatric/Behavioral: Negative  Historical Information   Past Medical History:   Diagnosis Date    Abnormal Pap smear of cervix     Anxiety     Depression     Endocarditis     2018    Hepatitis C     HPV (human papilloma virus) anogenital infection      Past Surgical History:   Procedure Laterality Date    KNEE SURGERY Left     MOUTH SURGERY       Social History   Social History     Substance and Sexual Activity   Alcohol Use No    Alcohol/week: 0 0 standard drinks     Social History     Substance and Sexual Activity   Drug Use Yes    Types: Heroin    Comment: last use - one week ago     Social History     Tobacco Use   Smoking Status Former Smoker    Packs/day: 0 25    Types: Cigarettes    Last attempt to quit: 11/9/2016    Years since quitting: 3 7   Smokeless Tobacco Never Used   Tobacco Comment    current everyday smoker per [place]     Family History: History reviewed  No pertinent family history      Meds/Allergies   all current active meds have been reviewed, current meds:   Current Facility-Administered Medications   Medication Dose Route Frequency    acetaminophen (TYLENOL) tablet 975 mg  975 mg Oral Q8H [address]    calcium carbonate (TUMS) chewable tablet 1,000 mg  1,000 mg Oral Daily PRN    ceFAZolin (ANCEF) IVPB (premix) 2,000 mg 50 mL  2,000 mg Intravenous Q8H    docusate sodium (COLACE) capsule 100 mg  100 mg Oral BID PRN    gabapentin (NEURONTIN) capsule 100 mg  100 mg Oral TID    glycopyrrolate (ROBINUL) injection 0 2 mg  0 2 mg Intravenous Q4H PRN    haloperidol lactate (HALDOL) injection 2 mg  2 mg Intramuscular Q30 Min PRN    ketamine 250 mg (STANDARD CONCENTRATION) IV in sodium chloride 0 9% 250 mL  0 1 mg/kg/hr Intravenous Continuous    ketorolac (TORADOL) injection 30 mg  30 mg Intravenous Q6H [facility]    lidocaine (LIDODERM) 5 % patch 2 patch  2 patch Topical Daily    LORazepam (ATIVAN) injection 1 mg  1 mg Intravenous Q1H PRN    methocarbamol (ROBAXIN) tablet 500 mg  500 mg Oral Q6H [facility]    mirtazapine (REMERON) tablet 30 mg  30 mg Oral HS    morphine (PF) 4 mg/mL injection 4 mg  4 mg Intravenous Q2H PRN    ondansetron (ZOFRAN) injection 4 mg  4 mg Intravenous Q6H PRN    oxyCODONE (ROXICODONE) immediate release tablet 10 mg  10 mg Oral Q4H PRN    oxyCODONE (ROXICODONE) IR tablet 15 mg  15 mg Oral Q4H PRN    and PTA meds:   None       Allergies   Allergen Reactions    Cat Hair Extract     Dog Epithelium     Latex     Pollen Extract        Objective   Temp:  [98 5 °F (36 9 °C)] 98 5 °F (36 9 °C)  HR:  [97] 97  Resp:  [18] 18  BP: (115)/(77) 115/77  No intake or output data in the 24 hours ending 08/13/20 0848    Physical Exam  Vitals signs and nursing note reviewed  Exam conducted with a chaperone present (Patient's mother in room  )  Constitutional:       General: She is awake  She is in acute distress  Appearance: Normal appearance  She is ill-appearing  She is not diaphoretic  HENT:      Head: Normocephalic and atraumatic  Mouth/Throat:      Mouth: Mucous membranes are moist       Pharynx: Oropharynx is clear  Eyes:      Conjunctiva/sclera: Conjunctivae normal       Pupils: Pupils are equal, round, and reactive to light  Cardiovascular:      Rate and Rhythm: Normal rate and regular rhythm  Pulses: Normal pulses  Pulmonary:      Effort: Pulmonary effort is normal  No respiratory distress  Breath sounds: Normal breath sounds and air entry  Abdominal:      General: Abdomen is flat   There is no distension  Palpations: Abdomen is soft  Tenderness: There is no abdominal tenderness  There is no guarding or rebound  Musculoskeletal:      Comments: Right hip tender to minimal palpation  Significantly increased pain with minimal range of motion  No deformity, ecchymosis  No crepitus  Skin:     General: Skin is warm and dry  Capillary Refill: Capillary refill takes less than 2 seconds  Neurological:      General: No focal deficit present  Mental Status: She is alert and oriented to person, place, and time  GCS: GCS eye subscore is 4  GCS verbal subscore is 5  GCS motor subscore is 6  Cranial Nerves: No cranial nerve deficit  Sensory: Sensation is intact  Motor: Motor function is intact  No weakness  Psychiatric:         Attention and Perception: Attention normal          Mood and Affect: Mood is anxious  Speech: Speech is rapid and pressured  Behavior: Behavior is agitated  Behavior is cooperative  Lab Results:   I have personally reviewed pertinent labs  , CBC:   Lab Results   Component Value Date    WBC 9 97 08/13/2020    HGB 7 5 (L) 08/13/2020    HCT 23 5 (L) 08/13/2020    MCV 83 08/13/2020     08/13/2020    MCH 26 5 (L) 08/13/2020    MCHC 31 9 08/13/2020    RDW 15 1 08/13/2020    MPV 9 7 08/13/2020    NRBC 0 08/13/2020   , CMP:   Lab Results   Component Value Date    SODIUM 139 08/13/2020    K 3 1 (L) 08/13/2020     08/13/2020    CO2 28 08/13/2020    BUN 10 08/13/2020    CREATININE 0 40 (L) 08/13/2020    CALCIUM 7 9 (L) 08/13/2020    EGFR 143 08/13/2020   , BMP:  Lab Results   Component Value Date    SODIUM 139 08/13/2020    K 3 1 (L) 08/13/2020     08/13/2020    CO2 28 08/13/2020    BUN 10 08/13/2020    CREATININE 0 40 (L) 08/13/2020    GLUC 108 08/13/2020    CALCIUM 7 9 (L) 08/13/2020    AGAP 8 08/13/2020    EGFR 143 08/13/2020   , PT/PTT:No results found for: PT, PTT    Imaging Studies: I have personally reviewed pertinent reports  EKG, Pathology, and Other Studies: I have personally reviewed pertinent reports  Counseling / Coordination of Care  Total floor / unit time spent today Level 5 = 110 minutes  Greater than 50% of total time was spent with the patient and / or family counseling and / or coordination of care  A description of the counseling / coordination of care:  Patient interview, physical examination, review of PDMP, review of medical record, review of imaging and laboratory data, review of outside medical records, development of pain management plan, discussion of pain management plan with patient and primary service      Eloina Greenberg PA-C

## 2020-08-13 NOTE — CONSULTS
Consultation - Infectious Disease   Judith Montoya 34 y o  female MRN: [id number]   [address] 425-01 Encounter: [id number]    IMPRESSION/Recommendations:  1  Recurrent/persistent MSSA bacteremia with TV infective endocarditis   Blood cultures from 8/10 remain positive   Prolonged course of IV antibiotic was previously recommended, but patient has left AMA on 2 prior occasions (once at [facility] and once from [facility])      - continue IV cefazolin 2g Q 8 hours  - repeat blood cultures x2 sets in a m  Pt is a difficult stick, and RN will attempt to obtain blood cx again this afternoon   - CT surgery service is following    - monitor clinical response, temperature curve, WBC count      2  Tricuspid valve endocarditis, 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  7/21 MELISSA showed large vegetation on TV with severe regurgitation  7/15 CT abdomen/pelvis also showed bilateral renal parenchymal abnormalities concerning for septic emboli   No intra-abdominal abscess     -antibiotic plan as above  -CT surgery is following      3  Recent left foot cellulitis with abscess   Likely site of injection of IV drugs versus ectopic foci in the setting of MSSA bacteremia   This appears to have healed with no evidence of active infection   -antibiotic plan as above      4  Back pain   More likely secondary to chronic pain, opioid dependence   8/1/20 Thoracic and lumbar spine MRIs performed [facility] negative were for abscess   -monitor back pain   -serial exams   -Pain management is following      5  Active IV heroin abuse   This is the causative factor for development of bacteremia and infective endocarditis   Patient has left AMA on prior occasions  -patient is not a candidate for at home PICC line/IV antibiotics      6  Chronic HCV infection, transaminitis   Recent HIV was negative   -monitor LFTs while on cefazolin      7  Fever    Secondary to persistent bacteremia and infective endocarditis    -antibiotic plan as above   -monitor temperature, hemodynamics, CBC   -supportive care  My recommendations were discussed with the patient  My recommendations were discussed with  BOO Garza, from the primary service  Thank you for allowing me to participate in the care of this patient  The ID service will follow  HISTORY OF PRESENT ILLNESS:  Reason for Consult:  Acute bacterial endocarditis  HPI: Marylin Chu is a 34y o  year old woman with PMH of active heroin IVDU , chronic HCV infection, prior MSSA bacteremia/possible endocarditis in 2018, recent admission in mid-July 2020 at [address] [place] [address] secondary to MSSA bacteremia with probable septic pulmonary emboli and loculated right pleural effusion  She developed left foot cellulitis with abscess and was supposed to go for operative debridement, but patient left AMA on 07/22/2020  She underwent MELISSA on 7/21 which showed a large tricuspid valve vegetation with severe tricuspid regurgitation  She was subsequently hospitalized at [facility] from 07/30 to 8/4 for MSSA bacteremia  CT chest/abdomen/pelvis revealed moderate consolidations bilaterally concerning for septic emboli again with loculated right effusion, and bilateral renal parenchymal abnormalities  MRI thoracic and lumbar spine was negative for abscess  She is a poor historian  Patient had persistent fevers at [facility], with last fever noted on 8/11  She was transferred to [facility] for CT surgery evaluation in view of tricuspid valve disease  She reports ongoing excruciating back and leg pain  She says she has pain all over  Of note her left ankle abscess self drained and has since improved  She is tolerating cefazolin IV without nausea vomiting or diarrhea  She denies chills or sweats currently  REVIEW OF SYSTEMS:  Constitutional:  positive for fever, appetite change activity change  HENT: Negative headache, runny nose, sore throat, congestion      Eyes: Negative for change in vision  Respiratory: Negative cough, shortness of breath  Cardiovascular: Negative for chest pain, palpitations  Gastrointestinal: Negative for abdominal pain, nausea, vomiting, diarrhea  Musculoskeletal:  positive for back pain  Skin:  Negative for rash  Neurological: Negative for numbness, tingling  Hematological: Negative for excessive bruising/bleeding  Psychiatric/Behavioral:  positive for agitation and sleep disturbance  PAST MEDICAL HISTORY:  Past Medical History:   Diagnosis Date    Abnormal Pap smear of cervix     Anxiety     Depression     Endocarditis     2018    Hepatitis C     HPV (human papilloma virus) anogenital infection      Past Surgical History:   Procedure Laterality Date    KNEE SURGERY Left     MOUTH SURGERY       FAMILY HISTORY:  Neg for recurrent infection    SOCIAL HISTORY:  Social History   Social History     Substance and Sexual Activity   Alcohol Use No    Alcohol/week: 0 0 standard drinks     Social History     Substance and Sexual Activity   Drug Use Yes    Types: Heroin    Comment: last use - one week ago     Social History     Tobacco Use   Smoking Status Former Smoker    Packs/day: 0 25    Types: Cigarettes    Last attempt to quit: 11/9/2016    Years since quitting: 3 7   Smokeless Tobacco Never Used   Tobacco Comment    current everyday smoker per Neena Fajardo     ALLERGIES:  Allergies   Allergen Reactions    Cat Hair Extract     Dog Epithelium     Latex     Pollen Extract      MEDICATIONS:  All current active medications have been reviewed    Antibiotics: cefazolin 2g IV q8 hours  Scheduled Meds:  Current Facility-Administered Medications   Medication Dose Route Frequency Provider Last Rate    acetaminophen  975 mg Oral [facility] Bella Mims MD      calcium carbonate  1,000 mg Oral Daily PRN Bella Mims MD      cefazolin  2,000 mg Intravenous Q8H Asha Roca PA-C 2,000 mg (08/13/20 0908)    docusate sodium  100 mg Oral BID PRN Jeremie Lobo MD      gabapentin  100 mg Oral TID Jeremie Lobo MD      glycopyrrolate  0 2 mg Intravenous Q4H PRN Jasson Pizarro PA-C      haloperidol lactate  2 mg Intramuscular Q30 Min PRN Jasson Pizarro PA-C      ketamine  0 1 mg/kg/hr Intravenous Continuous Jasson Pizarro PA-C 0 1 mg/kg/hr (08/13/20 1152)    ketorolac  30 mg Intravenous Q6H [address] Ney Hoop, CRNP      lidocaine  2 patch Topical Daily Asha Roca PA-C      LORazepam  1 mg Intravenous Q1H PRN Jasson Pizarro PA-C      methocarbamol  500 mg Oral Q6H [address] Ney Hoop, CRNP      mirtazapine  30 mg Oral HS Jeremie Lobo MD      morphine injection  4 mg Intravenous Q2H PRN Ney Hoop, CRNP      ondansetron  4 mg Intravenous Q6H PRN Jeremie Lobo MD      oxyCODONE  10 mg Oral Q4H PRN Ney Hoop, CRNP      oxyCODONE  15 mg Oral Q4H PRN Ney Hoop, CRNP       Continuous Infusions:ketamine, 0 1 mg/kg/hr, Last Rate: 0 1 mg/kg/hr (08/13/20 1152)      PRN Meds: calcium carbonate    docusate sodium    glycopyrrolate    haloperidol lactate    LORazepam    morphine injection    ondansetron    oxyCODONE    oxyCODONE    PHYSICAL EXAM:  Vitals:    08/12/20 1900 08/12/20 1955   BP: 115/77    BP Location: Left arm    Pulse: 97    Resp: 18    Temp: 98 5 °F (36 9 °C)    TempSrc: Tympanic    SpO2: 98% 95%   Weight: 63 5 kg (139 lb 15 9 oz)    Height: 5' 5" (1 651 m)      General Appearance:  Appears stated age, resting in bed, no acute distress   Head:  Normocephalic, without obvious abnormality, atraumatic   Eyes:  EOMI, Conjunctiva pink and sclera anicteric, both eyes   Nose: Nares normal, mucosa normal, no drainage   Throat: Oropharynx moist    Lungs:   Clear to auscultation bilaterally, respirations unlabored   Heart:  S1, S2, tachycardic; no murmur appreciated   Abdomen:   Soft, non-tender, non-distended   Extremities: No distal leg edema b/l  Peripheral IV intact   Skin: No rash   Healed scab lateral foot with minimal surrounding erythema; no drainage or tenderness  Neurologic: Alert and oriented times 3, conversant, fluent speech, VIRK x 4     LABS, IMAGING, & OTHER STUDIES:  Lab Results:  I have personally reviewed pertinent labs  Lab Results   Component Value Date    K 3 1 (L) 08/13/2020     08/13/2020    CO2 28 08/13/2020    BUN 10 08/13/2020    CREATININE 0 40 (L) 08/13/2020    CALCIUM 7 9 (L) 08/13/2020     (H) 08/10/2020    ALT 59 (H) 08/10/2020    ALKPHOS 130 0 08/10/2020    EGFR 143 08/13/2020     Lab Results   Component Value Date    WBC 9 97 08/13/2020    HGB 7 5 (L) 08/13/2020    HCT 23 5 (L) 08/13/2020    MCV 83 08/13/2020     08/13/2020   No results found for: Cindy   Results from last 7 days   Lab Units 08/10/20  0638 08/10/20  0546   BLOOD CULTURE  Staphylococcus aureus* Staphylococcus aureus*  Staphylococcus coagulase negative*   GRAM STAIN RESULT  Gram positive cocci in clusters* Gram positive cocci in clusters*     Imaging Studies:   pCXR 8/10: Persistent right lower lobe opacity which may be related to loculated pleural effusion  I have personally reviewed pertinent imaging study reports and images in PACS

## 2020-08-13 NOTE — H&P
H&P- Marielle [place] 1990, 34 y o  female MRN: [id number]    Unit/Bed#: [facility] 425-01 Encounter: [id number]    Primary Care Provider: Sheldon Gomez PA-C   Date and time admitted to hospital: 8/12/2020  7:39 PM        Acute bacterial endocarditis  Assessment & Plan  · Patient noted to have tricuspid while vegetation on echocardiogram done in July  Patient had a transthoracic and also transesophageal echocardiogram done during the last hospital stay  · Recent CT of the abdomen and pelvis and also CT chest shows abnormalities concerning for septic emboli  · Antibiotics per Infectious Disease  · Patient is transferred over here to be evaluated by CT surgery  · Monitor respiratory status    * Bacteremia due to Staphylococcus aureus  Assessment & Plan  · 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  Initially patient was admitted to [facility] from 07/15-blood culture were positive for MSSA bacteremia, but patient signed AMA on 07/22  Her repeat blood culture done on 7/21 was negative after 5 days  · Patient was admitted to [facility] on 07/30-blood culture came back positive for MSSA and she left again is medical advice on 08/04  Her blood cultures came back positive on 07/30, but the bacteremia cleared as of 8 /2  Patient left AMA on 08/04  · Patient was readmitted to [address] [place] [address] on 08/10-blood culture came back positive for Staph aureus  Currently on cefazolin per Infectious Disease  · 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    Intravenous drug abuse ([facility] [place] [address] )  Assessment & Plan  · Patient with acute drug abuse and likely the source of bacteremia  · Patient has a history of signing against medical advise  · Pain control  · May benefit from drug rehab eventually  · During the hospital stay in July of this year patient was positive for [facility] ,cocaine and opiates    Back pain  Assessment & Plan  · Patient is complaining of severe back pain mainly in the lower part of the back and also in the sacral region  · Refuses examination due to pain  · Patient had MRI of the lumbar and thoracic spine during the recent hospital stay in [facility] which was unremarkable  But patient reports that her pain is much worse and she is screaming out saying that she is in severe pain and oxycodone is not helping her at all  Will change to p o  Dilaudid  Acute pain service evaluation  · Patient with history of intravenous drug abuse-avoid IV pain medication    Bipolar 1 disorder ([facility] [address] )  Assessment & Plan  · Patient refused Psychiatry and neuropsych evaluation  · Supportive care    Septic embolism [facility])  Assessment & Plan  · Patient noted to have abnormalities seen in the CT of the chest abdomen and pelvis concerning for septic emboli  · There is pleural effusion on the CT scan of the chest and also on repeat chest x-ray  · Patient is rather asymptomatic from pulmonary standpoint  · Continue with the antibiotics  ·  Patient may benefit from thoracocentesis        VTE Prophylaxis: Low risk  /   Code Status: full code  POLST: POLST form is not discussed and not completed at this time  Discussion with family:     Anticipated Length of Stay:  Patient will be admitted on an Inpatient basis with an anticipated length of stay of  > 2 midnights  Justification for Hospital Stay:  Bacteremia/infective endocarditis    Total Time for Visit, including Counseling / Coordination of Care: 70 minutes  Greater than 50% of this total time spent on direct patient counseling and coordination of care      Chief Complaint:  Back pain    History of Present Illness:    Meryl Cadena Thea Cade is a 34 y o  female who presented as a transfer from [address] [place] [address] for CT surgery evaluation for tricuspid valve endocarditis  Patient with known history of intravenous drug abuse and she was initially admitted to [facility] on 7/15/2020  Patient found to be bacteremic and also had septic emboli on the CT scan of the abdomen and chest   Patient was on antibiotics and as per reviewing the chart it appears that the bacteremia cleared  Patient had a TTE and a MELISSA both of which were concerning for tricuspid valve endocarditis with tricuspid regurgitation  Unfortunately patient signed AMA on 7/24/2020  It appears that the patient was admitted to [facility] on 7/30/2020 and at that time she was bacteremic a cane with MSSA  By reviewing the records patient had MRI of the lumbar and thoracic spine which came back negative for any acute infectious process but the CT scan showed presence septic emboli in bilateral renal parenchyma and also multifocal nodular airspace consolidation bilateral concerning for septic emboli  Patient had repeat blood culture on 8/2/2020 and that was negative  But patient left AMA on 08/04/2020 since the patient was not happy about the cleanliness of the hospital   Patient was seen in [facility] with back pain and she was transferred to [address] [place] [address] for admission  Patient's blood culture done on 08/10 came back positive for Staph aureus  Final sensitivities pending  Currently patient is on antibiotics as per infectious disease recommendation  Patient was transferred over here to be evaluated by CT surgery given her persistent/recurrent bacteremia  Patient was also complaining of severe back pain   Psychiatry and neuropsychiatry try to evaluate the patient  By reviewing the records it appears that the patient is rather uncooperative with the treatment plan    Nursing reported that day as well as the patient was brought to the hospital she was screaming saying that she is in pain  When I was in the room she is screaming saying that we are not helping her with the pain, and there should be something that we can do to help her with the pain  She mentioned signing against medical advice multiple times during the encounter  Patient also believe she has scabies since she has a lesion on the left foot  She says that she has similar rash on the buttocks but not able to move because of the pain  Patient reported that she has not slept for the past 2 days because of pain  Review of Systems:    Review of Systems   Constitutional: Positive for activity change, appetite change and fever  Respiratory: Negative  Cardiovascular: Negative  Gastrointestinal: Negative  Genitourinary: Negative  Musculoskeletal: Positive for back pain  Neurological: Negative  Hematological: Negative  Psychiatric/Behavioral: Positive for agitation and sleep disturbance  Past Medical and Surgical History:     Past Medical History:   Diagnosis Date    Abnormal Pap smear of cervix     Anxiety     Depression     Endocarditis     2018    Hepatitis C     HPV (human papilloma virus) anogenital infection        Past Surgical History:   Procedure Laterality Date    KNEE SURGERY Left     MOUTH SURGERY         Meds/Allergies:    Prior to Admission medications    Not on File     I have reviewed home medications with a medical source (PCP, Pharmacy, other)  Allergies:    Allergies   Allergen Reactions    Cat Hair Extract     Dog Epithelium     Latex     Pollen Extract        Social History:     Marital Status: Single   Occupation:   Patient Pre-hospital Living Situation:  Lives at home with family  Patient Pre-hospital Level of Mobility:   Patient Pre-hospital Diet Restrictions:   Substance Use History:   Social History     Substance and Sexual Activity   Alcohol Use No     Social History     Tobacco Use   Smoking Status Former Smoker    Packs/day: 0 25    Types: Cigarettes    Last attempt to quit: 11/9/2016    Years since quitting: 3 7   Smokeless Tobacco Never Used   Tobacco Comment    current everyday smoker per Devonte Hicks     Social History     Substance and Sexual Activity   Drug Use Yes    Types: Heroin    Comment: last use - one week ago       Family History:    History reviewed  No pertinent family history  Physical Exam:     Vitals:   SpO2: 95 % (08/12/20 1955)    Physical Exam  Constitutional:       General: She is not in acute distress  Appearance: She is not ill-appearing  HENT:      Head: Normocephalic and atraumatic  Mouth/Throat:      Pharynx: No oropharyngeal exudate or posterior oropharyngeal erythema  Eyes:      General: No scleral icterus  Neck:      Musculoskeletal: Normal range of motion  Cardiovascular:      Heart sounds: Murmur present  Comments: Tachycardia  Pulmonary:      Effort: Pulmonary effort is normal       Comments: Decreased breath sounds bilateral  Abdominal:      General: Abdomen is flat  There is no distension  Palpations: Abdomen is soft  Musculoskeletal:         General: Swelling (Left ankle swelling) and tenderness present  Comments: Range of movement decreased due to pain   Skin:     General: Skin is warm  Findings: Erythema (Erythematous rash on the dorsum of right foot) present  Neurological:      General: No focal deficit present  Mental Status: She is alert and oriented to person, place, and time  Psychiatric:      Comments: Crying out ,           Additional Data:     Lab Results: I have personally reviewed pertinent reports        Results from last 7 days   Lab Units 08/10/20  0639   WBC Thousand/uL 5 82   HEMOGLOBIN g/dL 9 2*   HEMATOCRIT % 28 2*   PLATELETS Thousands/uL 322   NEUTROS PCT % 87*   LYMPHS PCT % 8*   MONOS PCT % 4   EOS PCT % 0     Results from last 7 days   Lab Units 08/10/20  0638   SODIUM mmol/L 134   POTASSIUM mmol/L 2 9*   CHLORIDE mmol/L 96   CO2 mmol/L 26   BUN mg/dL 15   CREATININE mg/dL 0 61   ANION GAP mmol/L 12   CALCIUM mg/dL 8 6   ALBUMIN g/dL 3 0*   TOTAL BILIRUBIN mg/dL 0 82   ALK PHOS U/L 130 0   ALT U/L 59*   AST U/L 134*   GLUCOSE RANDOM mg/dL 101     Results from last 7 days   Lab Units 08/10/20  0638   INR  1 20*                   Imaging: I have personally reviewed pertinent reports  No orders to display       EKG, Pathology, and Other Studies Reviewed on Admission:   · EKG:     Allscripts / Epic Records Reviewed: Yes     ** Please Note: This note has been constructed using a voice recognition system   **

## 2020-08-13 NOTE — PROGRESS NOTES
Patient refusing AM blood work  Patient states "No one is touching me until my pain is a zero"  SLIM PA-C Balbina Milks made aware  Patient requesting SLIM to come to bedside about pain  SLIM came to bedside  Patient states to allowing 1 more attempt at IV per SLIM  Patient took off telemetry monitor  The importance of telemetry monitor was explained to patient  Patient continued to refuse to have telemetry monitor on  SLIM MILLIE Conde made aware  Will continue to monitor

## 2020-08-13 NOTE — PROGRESS NOTES
Patient will to have vital signs performed and is okay with having another set of blood cultures done later tonight  However, pt is unwilling to be put on the monitor  Prince Murali NP, made aware  Rounded with Acute Pain at bedside  Will continue Ketamine gtt and current pain regimen

## 2020-08-13 NOTE — QUICK NOTE
Notified that pt refusing multiple interventions overnight  Unable to obtain IV access and refusing further attempts  IV infiltrated quickly into pm abx dose, pt refused PO alternative  Pt continued in severe pain overnight despite pain regimen in place  Was refusing additional tx modalities  States topical/lidocaine patches do not work, tylenol makes her "sweat"  Agreeable to reattempt oxycodone as she feels that helps more than PO dilaudid  Pt still agitated and upset with regimen  Only wanted to sleep  No change to pain or symptoms, only continued uncontrolled pain  Agreeable to reattempt IV access and trial ativan for sleep  Successful IV placement  Rescheduled IV abx for am  Acute pain to see today

## 2020-08-13 NOTE — ASSESSMENT & PLAN NOTE
· Patient with acute drug abuse and likely the source of bacteremia  · Patient has a history of signing against medical advise  · Pain control  · May benefit from drug rehab eventually  · During the hospital stay in July of this year patient was positive for [facility] ,cocaine and opiates  · Still pending urine drug screen   · Pt is refusing to urinate per nursing at this time I feel she will void when needed  She is demanding to have a purewick attached as she states she refuses to [employer]"  At this time we will work toward pain control

## 2020-08-13 NOTE — ASSESSMENT & PLAN NOTE
· Patient noted to have tricuspid while vegetation on echocardiogram done in July  Patient had a transthoracic and also transesophageal echocardiogram done during the last hospital stay  · Recent CT of the abdomen and pelvis and also CT chest shows abnormalities concerning for septic emboli  · Antibiotics per Infectious Disease  · Patient is transferred over here to be evaluated by CT surgery  · - at the time of their evaluation pt is bilgerant and uncooperative reportedly due to pain  · - she was noncommittal to abstain from illegal drugs or unintended use of medications  · - recommendations to continue iv abx use   · - when she continues to abstain and completes iv abx treatment they would consider surgical correction   · - would benefit from neuropsych and psychiatry input  · Upon my entry to room boundaries were set as pt was rude and saying the staff were "trash" I told her that is not acceptable and that there needs to be trust and respect   She is very sick and needs to complete treatment, or she could become septic and ultimately die  The pts mother is at bedside (works at [employer]) is trying to make pt be more cooperative     · Monitor respiratory status

## 2020-08-13 NOTE — PROGRESS NOTES
Progress Note - Brit Gaitan 1990, 34 y o  female MRN: [id number]    Unit/Bed#: [facility] 425-01 Encounter: [id number]    Primary Care Provider: Shruthi Mccray PA-C   Date and time admitted to hospital: 8/12/2020  7:39 PM        * Bacteremia due to Staphylococcus aureus  Assessment & Plan  · 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  Initially patient was admitted to [facility] from 07/15-blood culture were positive for MSSA bacteremia, but patient signed AMA on 07/22  Her repeat blood culture done on 7/21 was negative after 5 days  · Patient was admitted to 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 during the fast hospital stay from 7/15-7/22  · Continue with the antibiotics per Infectious Disease  · Repeat blood culture (pt has been refusing sticks extensive discussion had with the pt and the nursing staff to have the best person obtaining her blood)   · tmax 101  · CT surgery appreciated  · Patient also with previous history of MSSA bacteremia /possible endocarditis in 2018-    Intravenous drug abuse ([facility] [address] )  Assessment & Plan  · Patient with acute drug abuse and likely the source of bacteremia  · Patient has a history of signing against medical advise  · Pain control  · May benefit from drug rehab eventually  · During the hospital stay in July of this year patient was positive for [facility] ,cocaine and opiates  · Still pending urine drug screen   · Pt is refusing to urinate per nursing at this time I feel she will void when needed   She is demanding to have Back pain  Assessment & Plan  · Patient is complaining of severe back pain mainly in the lower part of the back and also in the sacral region  · Refuses examination due to pain  · Patient had MRI of the lumbar and thoracic spine during the recent hospital stay in [facility] which was unremarkable  But patient reports that her pain is much worse and she is screaming out saying that she is in severe pain and oxycodone is not helping her at all  Was changed to p o  Dilaudid over night   · Myself and acute pain at bedside discussed plan and pt seems to be calming down , adjustments made per acute pain recs   · - added robaxin 500 mg q 6  · - added Toradol 30 mg q 6 hrs per acute pain for 5 days monitor renal function   · - increased oxy to 10 mg and 15 mg   · - added iv morphine 4 mg q2 hrs prn for breakthrough  · -continue tylenol atc pt can refuse (she states it makes her break out in profuse sweats)   · - continue lidocaine patch  · Patient with history of intravenous drug abuse    Acute bacterial endocarditis  Assessment & Plan  · Patient noted to have tricuspid while vegetation on echocardiogram done in July  Patient had a transthoracic and also transesophageal echocardiogram done during the last hospital stay  · Recent CT of the abdomen and pelvis and also CT chest shows abnormalities concerning for septic emboli  · Antibiotics per Infectious Disease      · Patient is transferred over here to be evaluated by CT surgery  · - at the time of their evaluation pt is bilgerant and uncooperative reportedly due to pain  · - she was noncommittal to abstain from illegal drugs or unintended use of medications  · - recommendations to continue iv abx use   · - when she continues to abstain and completes iv abx treatment they would consider surgical correction   · - would benefit from neuropsych and psychiatry input  · Upon my entry to room boundaries were set as pt was rude and saying the staff were "trash" I told her that is not acceptable and that there needs to be trust and respect   She is very sick and needs to complete treatment, or she could become septic and ultimately die  The pts mother is at bedside (works at [employer]) is trying to make pt be more cooperative  · Monitor respiratory status    Bipolar 1 disorder [facility])  Assessment & Plan  · Patient refused Psychiatry and neuropsych evaluation, once pain more controlled will discuss again and have their input   · Supportive care    Septic embolism [facility])  Assessment & Plan  · Patient noted to have abnormalities seen in the CT of the chest abdomen and pelvis concerning for septic emboli  · There is pleural effusion on the CT scan of the chest and also on repeat chest x-ray  · Patient is rather asymptomatic from pulmonary standpoint  · Continue with the antibiotics  (reconsulted ID)   · Thoracic surgery have evaluated the pt and she is not short of breath       VTE Pharmacologic Prophylaxis:   Pharmacologic: ambulatory  Mechanical VTE Prophylaxis in Place: Yes    Patient Centered Rounds: I have performed bedside rounds with nursing staff today  Discussions with Specialists or Other Care Team Provider: nursing / and acute pain at bedside     Education and Discussions with Family / Patient: patient and mother at the bedside     Time Spent for Care: 45 minutes  More than 50% of total time spent on counseling and coordination of care as described above  Current Length of Stay: 1 day(s)    Current Patient Status: Inpatient   Certification Statement: The patient will continue to require additional inpatient hospital stay due to due to need for iv abx   Discharge Plan: DO NOT expect dc in next 7 days unless patient leaves AMA     Code Status: Level 1 - Full Code      Subjective:   Upon arrival to room Pain Management is at bedside  Extensive discussion regarding pain regimen  Patient refuses Dilaudid says that that does not work at all    She is agreeable to Oxy/morphine at the time of evaluation  She reports generalized pain upon examination of her feet she is very tender and sensitive to removal of socks  When asked if the areas in her feet of from injecting she says no however all along blood vessel lines  Mother is at bedside, discussion had in regards to blood work and the need to have to draw labs on occasion  Patient starts yelling and states that no one can stick her they are all trash  Firm discussion had with patient in regards to her behavior and discussion and that she needs to respect the employees as we are respecting her  Patient is agreeable "as long as she gets her pain under control "     Objective:     Vitals:   Temp (24hrs), Av 5 °F (36 9 °C), Min:98 5 °F (36 9 °C), Max:98 5 °F (36 9 °C)    Temp:  [98 5 °F (36 9 °C)] 98 5 °F (36 9 °C)  HR:  [97] 97  Resp:  [18] 18  BP: (115)/(77) 115/77  SpO2:  [95 %-98 %] 95 %  Body mass index is 23 3 kg/m²  Input and Output Summary (last 24 hours):     No intake or output data in the 24 hours ending 20 1040    Physical Exam:     Physical Exam  Constitutional:       General: She is not in acute distress  Appearance: She is not ill-appearing, toxic-appearing or diaphoretic  HENT:      Nose: No congestion  Mouth/Throat:      Pharynx: No oropharyngeal exudate or posterior oropharyngeal erythema  Eyes:      Conjunctiva/sclera: Conjunctivae normal    Neck:      Musculoskeletal: No neck rigidity or muscular tenderness  Cardiovascular:      Rate and Rhythm: Tachycardia present  Heart sounds: No murmur  No friction rub  No gallop  Pulmonary:      Effort: No respiratory distress  Breath sounds: No stridor  No wheezing or rales  Comments: Poor effort groa  Chest:      Chest wall: No tenderness  Abdominal:      General: There is no distension  Palpations: There is no mass  Tenderness: There is no abdominal tenderness  There is no guarding or rebound  Hernia: No hernia is present  Musculoskeletal:         General: Tenderness (lower extremitites tender with what appears to be injection sites (pt denies) ) present  No swelling or deformity  Skin:     Coloration: Skin is not jaundiced or pale  Findings: Rash (right anterior ankle and left ac area slight rash ) present  No bruising or erythema  Neurological:      Mental Status: She is alert and oriented to person, place, and time  Psychiatric:      Comments: Angry yelling and beligerent calming down with discussion            Additional Data:     Labs:    Results from last 7 days   Lab Units 08/13/20  0447   WBC Thousand/uL 9 97   HEMOGLOBIN g/dL 7 5*   HEMATOCRIT % 23 5*   PLATELETS Thousands/uL 372   NEUTROS PCT % 60   LYMPHS PCT % 29   MONOS PCT % 8   EOS PCT % 2     Results from last 7 days   Lab Units 08/13/20  0447 08/10/20  0638   SODIUM mmol/L 139 134   POTASSIUM mmol/L 3 1* 2 9*   CHLORIDE mmol/L 103 96   CO2 mmol/L 28 26   BUN mg/dL 10 15   CREATININE mg/dL 0 40* 0 61   ANION GAP mmol/L 8 12   CALCIUM mg/dL 7 9* 8 6   ALBUMIN g/dL  --  3 0*   TOTAL BILIRUBIN mg/dL  --  0 82   ALK PHOS U/L  --  130 0   ALT U/L  --  59*   AST U/L  --  134*   GLUCOSE RANDOM mg/dL 108 101     Results from last 7 days   Lab Units 08/10/20  0638   INR  1 20*                       * I Have Reviewed All Lab Data Listed Above  * Additional Pertinent Lab Tests Reviewed:  All Labs Within Last 24 Hours Reviewed    Imaging:    Imaging Reports Reviewed Today Include: refused   Recent Cultures (last 7 days):     Results from last 7 days   Lab Units 08/10/20  0638 08/10/20  0546   BLOOD CULTURE  Staphylococcus aureus* Staphylococcus aureus*  Staphylococcus coagulase negative*   GRAM STAIN RESULT  Gram positive cocci in clusters* Gram positive cocci in clusters*       Last 24 Hours Medication List:   Current Facility-Administered Medications   Medication Dose Route Frequency Provider Last Rate    acetaminophen  975 mg Oral [facility] David Huertas MD      calcium carbonate  1,000 mg Oral Daily PRN David Huertas, MD      cefazolin  2,000 mg Intravenous Q8H Asha Roca PA-C 2,000 mg (08/13/20 0908)    docusate sodium  100 mg Oral BID PRN David Huertas MD      gabapentin  100 mg Oral TID David Huertas, MD      ketorolac  30 mg Intravenous Q6H [address] BOO Deleon      lidocaine  2 patch Topical Daily Asha Roca PA-C      methocarbamol  500 mg Oral Q6H [address] BOO Deleon      mirtazapine  30 mg Oral HS David Huertas, MD      morphine injection  4 mg Intravenous Q2H PRN BOO Deleon      morphine injection  4 mg Intravenous Once Jonathan Ladd PA-C      ondansetron  4 mg Intravenous Q6H PRN David Huertas MD      oxyCODONE  10 mg Oral Q4H PRN BOO Deleon      oxyCODONE  15 mg Oral Q4H PRN BOO Deleon          Today, Patient Was Seen By: BOO Deleon    ** Please Note: Dictation voice to text software may have been used in the creation of this document   **

## 2020-08-13 NOTE — ASSESSMENT & PLAN NOTE
· Patient noted to have abnormalities seen in the CT of the chest abdomen and pelvis concerning for septic emboli  · There is pleural effusion on the CT scan of the chest and also on repeat chest x-ray  · Patient is rather asymptomatic from pulmonary standpoint  · Continue with the antibiotics     ·  Patient may benefit from thoracocentesis

## 2020-08-13 NOTE — CONSULTS
Consultation - Cardiothoracic Surgery   Betty Awan 34 y o  female MRN: [id number]  Unit/Bed#: [facility] 425-01 Encounter: [id number]      History of Present Illness   Physician Requesting Consult: Jessica Cohen MD  Reason for Consult / Principal Problem: tricupsid valve endocarditits    HPI: Betty Awan is a 34y o  year old female with a past medical history notable for non treated hepatitis C, long standing opiate abuse followed by IV drug abuse with heroin and poor medical compliance over the past 1 month with signing out [place] on 2 separate occasions once at [facility] and once at [facility] for endocarditis  Upon examination today she is seen with her mother in consultation  She states she has back pain and leg pain and needs pain medications  She is uncooperative and can not state the last day she used any drug in its unintended form  She has had bactermia since 7/15 without completed antibioitc course due to signing out of the hospital AMA x 2  By the records, she has had a MRI of spine with negative for infectious process  She had a CTA with CT abdomen pelvis here with multiple areas of infectious processes - loculated right effusion and areas suggestive of pulmonary septic emboli  She has complaints of insomnia, butt pain, leg pain and is screaming saying we are not helping with the pain  She does not answer questions completely or specifically to specify where she has complaints  She uses vulgar language at me and about things I am questioning  Neuropsychiatry and Psychiatry have attempted to evaluate patient but patient refused their evaluation  Consults    Review of Systems   Constitutional: Positive for activity change, fatigue and fever  HENT: Positive for dental problem  Denies seeing dentist   Eyes: Negative  Respiratory: Negative  Cardiovascular: Negative  Gastrointestinal: Negative  Endocrine: Negative  Genitourinary: Negative      Musculoskeletal: Positive for arthralgias, back pain and myalgias  Allergic/Immunologic: Negative  Neurological: Negative  Hematological: Negative  Psychiatric/Behavioral: Positive for agitation and sleep disturbance  Historical Information   Past Medical History:   Diagnosis Date    Abnormal Pap smear of cervix     Anxiety     Depression     Endocarditis     2018    Hepatitis C     HPV (human papilloma virus) anogenital infection      Past Surgical History:   Procedure Laterality Date    KNEE SURGERY Left     MOUTH SURGERY       Social History     Substance and Sexual Activity   Alcohol Use No    Alcohol/week: 0 0 standard drinks     Social History     Substance and Sexual Activity   Drug Use Yes    Types: Heroin    Comment: last use - one week ago     Social History     Tobacco Use   Smoking Status Former Smoker    Packs/day: 0 25    Types: Cigarettes    Last attempt to quit: 11/9/2016    Years since quitting: 3 7   Smokeless Tobacco Never Used   Tobacco Comment    current everyday smoker per [place]     Family History:   Father: no known medical history  Mother: hx of heart murmur, unsure of which valve is involved    Meds/Allergies   all current active meds have been reviewed  Allergies   Allergen Reactions    Cat Hair Extract     Dog Epithelium     Latex     Pollen Extract        Objective   Vitals: Blood pressure 115/77, pulse 97, temperature 98 5 °F (36 9 °C), temperature source Tympanic, resp  rate 18, height 5' 5" (1 651 m), weight 63 5 kg (139 lb 15 9 oz), SpO2 95 %, not currently breastfeeding  Invasive Devices     Peripheral Intravenous Line            Peripheral IV 08/13/20 Right Wrist less than 1 day                Physical Exam  Constitutional:       Appearance: Normal appearance  HENT:      Head: Normocephalic and atraumatic  Nose: Nose normal    Eyes:      Extraocular Movements: Extraocular movements intact        Conjunctiva/sclera: Conjunctivae normal       Pupils: Pupils are equal, round, and reactive to light  Neck:      Musculoskeletal: Normal range of motion and neck supple  Cardiovascular:      Rate and Rhythm: Normal rate  Comments: Refuses a longer more further cardiac examination  Pulmonary:      Effort: Pulmonary effort is normal       Breath sounds: Normal breath sounds  Abdominal:      General: Abdomen is flat  Bowel sounds are normal    Musculoskeletal: Normal range of motion  Comments: She moves all extremities x 4 independently   Skin:     General: Skin is warm and dry  Neurological:      General: No focal deficit present  Mental Status: She is alert and oriented to person, place, and time  Psychiatric:      Comments: Agitated, uncooperative behavior, uses avoidance to answer questions, unstable mood         Lab Results:   Results from last 7 days   Lab Units 08/13/20  0447 08/10/20  0639   WBC Thousand/uL 9 97 5 82   HEMOGLOBIN g/dL 7 5* 9 2*   HEMATOCRIT % 23 5* 28 2*   PLATELETS Thousands/uL 372 322     Results from last 7 days   Lab Units 08/13/20  0447 08/10/20  0638   POTASSIUM mmol/L 3 1* 2 9*   CHLORIDE mmol/L 103 96   CO2 mmol/L 28 26   BUN mg/dL 10 15   CREATININE mg/dL 0 40* 0 61   CALCIUM mg/dL 7 9* 8 6     Results from last 7 days   Lab Units 08/10/20  0638   INR  1 20*   PTT seconds 31     No results found for: HGBA1C  Lab Results   Component Value Date    TROPONINI <0 02 06/10/2019       Imaging Studies:   MELISSA 7/21/2020  LEFT VENTRICLE: Size was normal  Systolic function was normal  Ejection fraction was estimated to be 65 %  There were no regional wall motion abnormalities  Wall thickness was normal      RIGHT VENTRICLE: The size was normal  Systolic function was normal  Wall thickness was normal      LEFT ATRIUM: Size was normal  No thrombus was identified  APPENDAGE: No thrombus was identified      ATRIAL SEPTUM: There was a small patent foramen ovale  Doppler evaluation was performed         RIGHT ATRIUM: Size was normal  No thrombus was identified      MITRAL VALVE: Valve structure was normal  There was normal leaflet separation  There was no echocardiographic evidence of vegetation  DOPPLER: There was trace regurgitation      AORTIC VALVE: The valve was trileaflet  Leaflets exhibited normal thickness and normal cuspal separation  There was no echocardiographic evidence of vegetation  DOPPLER: There was no regurgitation      TRICUSPID VALVE: The valve structure was normal  There was normal leaflet separation  There was malcoaptation of the valve leaflets  There was a medium-sized, papillary, mobile vegetation, measuring 11 mm x 10 mm on the tip of the anterior  leaflet, on the right atrial aspect  DOPPLER: There was moderate to severe regurgitation  The regurgitant jet was toward the septum      PULMONIC VALVE: Leaflets exhibited normal thickness, no calcification, and normal cuspal separation  There was no echocardiographic evidence of vegetation      PERICARDIUM: There was no pericardial effusion  The pericardium was normal in appearance      AORTA: The root exhibited normal size  There was no atheroma  There was no evidence for dissection  There was no evidence for aneurysm      CTA chest 7/16/20: IMPRESSION:     No evidence of pulmonary embolism      Scattered nodules are seen some of which are more peripherally based and suggests the possibility of multifocal pneumonia or perhaps septic emboli  Metastatic disease is possible although less likely given the clinical circumstances  Echocardiography   may be useful      There is mediastinal adenopathy and splenomegaly  Neoplastic possibilities including lymphoma could also be considered  Atypical infection such as mycobacterial or fungal and be associated adenopathy and multifocal infiltrates      Loculated right pleural effusion  Infection is not excluded  No gas bubbles are seen however  CT abdomen pelvis w contrast:  IMPRESSION:     1    Somewhat streaky opacification of the renal cortex bilaterally consistent with pyelonephritis, uncomplicated  2   Partial evaluation of a loculated right basilar pleural effusion with right middle lobe opacity likely reflecting atelectasis with infiltrate not excluded  Consider follow-up CT chest     Assessment:  Patient Active Problem List    Diagnosis Date Noted    Intravenous drug abuse ([facility] ) 08/12/2020    Back pain 08/11/2020    Acute bacterial endocarditis 08/10/2020    Septic embolism ([facility] ) 08/10/2020    Hypokalemia 08/10/2020    Bipolar 1 disorder ([facility] ) 08/10/2020    Abscess of left foot 07/20/2020    Insomnia 07/20/2020    Bacteremia due to Staphylococcus aureus 07/17/2020    Acute pyelonephritis 07/15/2020    Loculated pleural effusion 07/15/2020    Heroin abuse ([facility] ) 07/15/2020    Spontaneous vaginal delivery 02/04/2017    40 weeks gestation of pregnancy 01/31/2017       Plan:  She has not yet confirmed she will continue to abstain from illegal drug use or unintended use of medications  She should continue medical management with IV antibiotics/ID recommendations  When she continues to abstain and completes ID recommended antibiotics we can reevaluate for surgical correction  She unfortunately states she is "done using drugs" but then states "she wants to leave " She would benefit from a neuropsychiatry and/or psychiatry evaluation as well  She has an acute pain consult pending at this time  It's unfortunate she would not allow these services to assess her and she has been encouraged to allow them to talk with her  Her mother was in the room during consultation and understands  The patient was comfortable with our recommendations, and their questions were answered to their satisfaction  Thank you for allowing us to participate in the care of this patient       [place], [place]  9:48 AM  08/13/20

## 2020-08-13 NOTE — ASSESSMENT & PLAN NOTE
· Patient with acute drug abuse and likely the source of bacteremia    · Patient has a history of signing against medical advise  · Pain control  · May benefit from drug rehab eventually  · During the hospital stay in July of this year patient was positive for [facility] ,cocaine and opiates

## 2020-08-13 NOTE — PROGRESS NOTES
Patient requesting to see SLIM for patient pain in right leg  Patient refused Keflex PO  Patient refused to allow me to assess her  SLIM MILLIE Lemon made aware and came to bedside  SLIM ordered lidocaine patch oxycodone 10 mg once and aqua K  Patient refused aqua K and lidocaine patch  Patient refusing vital signs  CATHY made aware  Will continue to monitor

## 2020-08-13 NOTE — ASSESSMENT & PLAN NOTE
· Patient noted to have abnormalities seen in the CT of the chest abdomen and pelvis concerning for septic emboli  · There is pleural effusion on the CT scan of the chest and also on repeat chest x-ray  · Patient is rather asymptomatic from pulmonary standpoint  · Continue with the antibiotics   (reconsulted ID)   · Thoracic surgery have evaluated the pt and she is not short of breath

## 2020-08-13 NOTE — PROGRESS NOTES
2030 went to hang pt IV ancef, IV from LTAC, located within [facility] infiltrated  Pt refused to allow me to assess her for a new site stating "I have horrible veins I need ultra sound"  2100 ICU RN attempted IV insertion with ultrasound, 1 attempt unsuccessful, pt asked for a break because of pain  2300 another ICU nurse attempted IV with ultrasound, after unsuccessful insertion pt stated "no more for tonight I cannot take this, I need a PICC line"  Pt currently does not have IV access  SLIM aware

## 2020-08-13 NOTE — ASSESSMENT & PLAN NOTE
· Patient is complaining of severe back pain mainly in the lower part of the back and also in the sacral region  · Refuses examination due to pain  · Patient had MRI of the lumbar and thoracic spine during the recent hospital stay in [facility] which was unremarkable  But patient reports that her pain is much worse and she is screaming out saying that she is in severe pain and oxycodone is not helping her at all  Was changed to p o  Dilaudid over night     · Myself and acute pain at bedside discussed plan and pt seems to be calming down , adjustments made per acute pain recs   · - added robaxin 500 mg q 6  · - added Toradol 30 mg q 6 hrs per acute pain for 5 days monitor renal function   · - increased oxy to 10 mg and 15 mg   · - added iv morphine 4 mg q2 hrs prn for breakthrough  · -continue tylenol atc pt can refuse (she states it makes her break out in profuse sweats)   · - continue lidocaine patch  · Patient with history of intravenous drug abuse

## 2020-08-13 NOTE — ASSESSMENT & PLAN NOTE
· 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  Initially patient was admitted to [address] [place] [address] from 07/15-blood culture were positive for MSSA bacteremia, but patient signed AMA on 07/22  Her repeat blood culture done on 7/21 was negative after 5 days  · Patient was admitted to [facility] on 07/30-blood culture came back positive for MSSA and she left again is medical advice on 08/04  Her blood cultures came back positive on 07/30, but the bacteremia cleared as of 8 /2  Patient left AMA on 08/04  · Patient was readmitted to [address] [place] [address] on 08/10-blood culture came back positive for Staph aureus  Currently on cefazolin per Infectious Disease  · 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

## 2020-08-13 NOTE — ASSESSMENT & PLAN NOTE
· Patient refused Psychiatry and neuropsych evaluation, once pain more controlled will discuss again and have their input   · Supportive care

## 2020-08-13 NOTE — PROGRESS NOTES
Patient screaming and crying in pain  She is refusing to wear her telemetry monitor  She continues to refuse vitals  She is also refusing to use the bedpan or commode and told the RN she will hold her urine until her pain is a zero  She is demanding to speak with a doctor about her pain  Betty Carcamo with [facility] made aware and on floor with Bonnie Dancer from acute pain to assess and round on patient

## 2020-08-13 NOTE — ASSESSMENT & PLAN NOTE
· 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  Initially patient was admitted to [address] [place] [address] from 07/15-blood culture were positive for MSSA bacteremia, but patient signed AMA on 07/22  Her repeat blood culture done on 7/21 was negative after 5 days  · Patient was admitted to [facility] on 07/30-blood culture came back positive for MSSA and she left again is medical advice on 08/04  Her blood cultures came back positive on 07/30, but the bacteremia cleared as of 8 /2  Patient left AMA on 08/04  · Patient was readmitted to [address] [place] [address] on 08/10-blood culture came back positive for Staph aureus  Currently on cefazolin per Infectious Disease  · 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

## 2020-08-13 NOTE — PLAN OF CARE
Problem: Prexisting or High Potential for Compromised Skin Integrity  Goal: Skin integrity is maintained or improved  Description: INTERVENTIONS:  - Identify patients at risk for skin breakdown  - Assess and monitor skin integrity  - Assess and monitor nutrition and hydration status  - Monitor labs   - Assess for incontinence   - Turn and reposition patient  - Assist with mobility/ambulation  - Relieve pressure over bony prominences  - Avoid friction and shearing  - Provide appropriate hygiene as needed including keeping skin clean and dry  - Evaluate need for skin moisturizer/barrier cream  - Collaborate with interdisciplinary team   - Patient/family teaching  - Consider wound care consult   Outcome: Progressing     Problem: PAIN - ADULT  Goal: Verbalizes/displays adequate comfort level or baseline comfort level  Description: Interventions:  - Encourage patient to monitor pain and request assistance  - Assess pain using appropriate pain scale  - Administer analgesics based on type and severity of pain and evaluate response  - Implement non-pharmacological measures as appropriate and evaluate response  - Consider cultural and social influences on pain and pain management  - Notify physician/advanced practitioner if interventions unsuccessful or patient reports new pain  Outcome: Progressing     Problem: INFECTION - ADULT  Goal: Absence or prevention of progression during hospitalization  Description: INTERVENTIONS:  - Assess and monitor for signs and symptoms of infection  - Monitor lab/diagnostic results  - Monitor all insertion sites, i e  indwelling lines, tubes, and drains  - Monitor endotracheal if appropriate and nasal secretions for changes in amount and color  - [place] appropriate cooling/warming therapies per order  - Administer medications as ordered  - Instruct and encourage patient and family to use good hand hygiene technique  - Identify and instruct in appropriate isolation precautions for identified infection/condition  Outcome: Progressing  Goal: Absence of fever/infection during neutropenic period  Description: INTERVENTIONS:  - Monitor WBC    Outcome: Progressing     Problem: SAFETY ADULT  Goal: Patient will remain free of falls  Description: INTERVENTIONS:  - Assess patient frequently for physical needs  -  Identify cognitive and physical deficits and behaviors that affect risk of falls    -  [place] fall precautions as indicated by assessment   - Educate patient/family on patient safety including physical limitations  - Instruct patient to call for assistance with activity based on assessment  - Modify environment to reduce risk of injury  - Consider OT/PT consult to assist with strengthening/mobility  Outcome: Progressing  Goal: Maintain or return to baseline ADL function  Description: INTERVENTIONS:  -  Assess patient's ability to carry out ADLs; assess patient's baseline for ADL function and identify physical deficits which impact ability to perform ADLs (bathing, care of mouth/teeth, toileting, grooming, dressing, etc )  - Assess/evaluate cause of self-care deficits   - Assess range of motion  - Assess patient's mobility; develop plan if impaired  - Assess patient's need for assistive devices and provide as appropriate  - Encourage maximum independence but intervene and supervise when necessary  - Involve family in performance of ADLs  - Assess for home care needs following discharge   - Consider OT consult to assist with ADL evaluation and planning for discharge  - Provide patient education as appropriate  Outcome: Progressing  Goal: Maintain or return mobility status to optimal level  Description: INTERVENTIONS:  - Assess patient's baseline mobility status (ambulation, transfers, stairs, etc )    - Identify cognitive and physical deficits and behaviors that affect mobility  - Identify mobility aids required to assist with transfers and/or ambulation (gait belt, sit-to-stand, lift, walker, cane, etc )  - [place] fall precautions as indicated by assessment  - Record patient progress and toleration of activity level on Mobility SBAR; progress patient to next Phase/Stage  - Instruct patient to call for assistance with activity based on assessment  - Consider rehabilitation consult to assist with strengthening/weightbearing, etc   Outcome: Progressing     Problem: CARDIOVASCULAR - ADULT  Goal: Maintains optimal cardiac output and hemodynamic stability  Description: INTERVENTIONS:  - Monitor I/O, vital signs and rhythm  - Monitor for S/S and trends of decreased cardiac output  - Administer and titrate ordered vasoactive medications to optimize hemodynamic stability  - Assess quality of pulses, skin color and temperature  - Assess for signs of decreased coronary artery perfusion  - Instruct patient to report change in severity of symptoms  Outcome: Progressing  Goal: Absence of cardiac dysrhythmias or at baseline rhythm  Description: INTERVENTIONS:  - Continuous cardiac monitoring, vital signs, obtain 12 lead EKG if ordered  - Administer antiarrhythmic and heart rate control medications as ordered  - Monitor electrolytes and administer replacement therapy as ordered  Outcome: Progressing     Problem: METABOLIC, FLUID AND ELECTROLYTES - ADULT  Goal: Electrolytes maintained within normal limits  Description: INTERVENTIONS:  - Monitor labs and assess patient for signs and symptoms of electrolyte imbalances  - Administer electrolyte replacement as ordered  - Monitor response to electrolyte replacements, including repeat lab results as appropriate  - Instruct patient on fluid and nutrition as appropriate  Outcome: Progressing  Goal: Fluid balance maintained  Description: INTERVENTIONS:  - Monitor labs   - Monitor I/O and WT  - Instruct patient on fluid and nutrition as appropriate  - Assess for signs & symptoms of volume excess or deficit  Outcome: Progressing  Goal: Glucose maintained within target range  Description: INTERVENTIONS:  - Monitor Blood Glucose as ordered  - Assess for signs and symptoms of hyperglycemia and hypoglycemia  - Administer ordered medications to maintain glucose within target range  - Assess nutritional intake and initiate nutrition service referral as needed  Outcome: Progressing     Problem: HEMATOLOGIC - ADULT  Goal: Maintains hematologic stability  Description: INTERVENTIONS  - Assess for signs and symptoms of bleeding or hemorrhage  - Monitor labs  - Administer supportive blood products/factors as ordered and appropriate  Outcome: Progressing

## 2020-08-13 NOTE — UTILIZATION REVIEW
Initial Clinical Review    Admission: Date/Time/Statement:   Admission Orders (From admission, onward)     Ordered        08/12/20 2001  Inpatient Admission  Once                   Orders Placed This Encounter   Procedures    Inpatient Admission     Standing Status:   Standing     Number of Occurrences:   1     Order Specific Question:   Admitting Physician     Answer:   Julia Wiggins [[id number]]     Order Specific Question:   Level of Care     Answer:   Med Surg [[id number]]     Order Specific Question:   Estimated length of stay     Answer:   More than 2 Midnights     Order Specific Question:   Certification     Answer:   I certify that inpatient services are medically necessary for this patient for a duration of greater than two midnights  See H&P and MD Progress Notes for additional information about the patient's course of treatment  Assessment/Plan: 34year old female, presented to the ED @ [facility],  Admitted,  Transferred to [facility] level of care, via EMS  Admitted as Inpatient due to Bacteremia due to Staphylococcus Aureus  transfer from [address] [place] [address] for CT surgery evaluation for tricuspid valve endocarditis  Patient with known history of intravenous drug abuse and she was initially admitted to the [address] [place] [address] on 7/15/2020  Patient found to be bacteremic and also had septic emboli on the CT scan of the abdomen and chest   Patient was on antibiotics and as per reviewing the chart it appears that the bacteremia cleared  Patient had a TTE and a MELISSA both of which were concerning for tricuspid valve endocarditis with tricuspid regurgitation  Unfortunately patient signed AMA on 7/24/2020  It appears that the patient was admitted to [facility] on 7/30/2020 and at that time she was bacteremic a cane with MSSA    By reviewing the records patient had MRI of the lumbar and thoracic spine which came back negative for any acute infectious process but the CT scan showed presence septic emboli in bilateral renal parenchyma and also multifocal nodular airspace consolidation bilateral concerning for septic emboli  Patient had repeat blood culture on 8/2/2020 and that was negative  But patient left AMA on 08/04/2020 since the patient was not happy about the cleanliness of the hospital   Patient was seen in [facility] with back pain and she was transferred to [address] [place] [address] for admission  Patient's blood culture done on 08/10 came back positive for Staph aureus  Final sensitivities pending  Currently patient is on antibiotics as per infectious disease recommendation  Patient was transferred over here to be evaluated by CT surgery given her persistent/recurrent bacteremia  Patient was also complaining of severe back pain   Psychiatry and neuropsychiatry try to evaluate the patient  By reviewing the records it appears that the patient is rather uncooperative with the treatment plan  Nursing reported that day as well as the patient was brought to the hospital she was screaming saying that she is in pain  She mentioned signing against medical advice multiple times during the encounter  Patient also believe she has scabies since she has a lesion on the left foot  She says that she has similar rash on the buttocks but not able to move because of the pain  Patient reported that she has not slept for the past 2 days because of pain  + for activity change, appetiie change and fever, back pain, agitation and sleep disturbance  08/13/2020  Consult CT surgery: She has not yet confirmed she will continue to abstain from illegal drug use or unintended use of medications  She should continue medical management with IV antibiotics/ID recommendations  When she continues to abstain and completes ID recommended antibiotics we can reevaluate for surgical correction   She unfortunately states she is "done using drugs" but then states "she wants to leave " She would benefit from a neuropsychiatry and/or psychiatry evaluation as well  She has an acute pain consult pending at this time  It's unfortunate she would not allow these services to assess her and she has been encouraged to allow them to talk with her  Her mother was in the room during consultation and understands  Triage Vitals   Temperature Pulse Respirations Blood Pressure SpO2   08/12/20 1900 08/12/20 1900 08/12/20 1900 08/12/20 1900 08/12/20 1900   98 5 °F (36 9 °C) 97 18 115/77 98 %      Temp Source Heart Rate Source Patient Position - Orthostatic VS BP Location FiO2 (%)   08/12/20 1900 -- 08/12/20 1900 08/12/20 1900 --   Tympanic  Lying Left arm       Pain Score       08/12/20 2000       Worst Possible Pain          Wt Readings from Last 1 Encounters:   08/12/20 63 5 kg (139 lb 15 9 oz)     Additional Vital Signs:   Date/Time   Temp   Pulse   Resp   BP   MAP (mmHg)   SpO2   O2 Device   Patient Position - Orthostatic VS    08/13/20 1601   98 5 °F (36 9 °C)   103   14   114/75   89   94 %   None (Room air)   Lying    08/13/20 1600                  94 %   None (Room air)       08/13/20 1330            122/68   97             08/13/20 1300            114/73   97             08/13/20 1230            112/73   101             08/13/20 1215            124/76   98             08/13/20 1200            121/76   99             08/13/20 1152         16   127/84   92             08/12/20 1955                  95 %            08/10/2020 @ 0928  Chest X:  No acute cardiopulmonary disease   Persistent right lower lobe opacity which may be related to loculated pleural effusion       Pertinent Labs/Diagnostic Test Results:     Results from last 7 days   Lab Units 08/13/20  0447 08/10/20  0639   WBC Thousand/uL 9 97 5 82   HEMOGLOBIN g/dL 7 5* 9 2*   HEMATOCRIT % 23 5* 28 2*   PLATELETS Thousands/uL 372 322   NEUTROS ABS Thousands/µL 5 87 4 98     Results from last 7 days Lab Units 08/13/20  0447 08/10/20  0638   SODIUM mmol/L 139 134   POTASSIUM mmol/L 3 1* 2 9*   CHLORIDE mmol/L 103 96   CO2 mmol/L 28 26   ANION GAP mmol/L 8 12   BUN mg/dL 10 15   CREATININE mg/dL 0 40* 0 61   EGFR ml/min/1 73sq m 143 125   CALCIUM mg/dL 7 9* 8 6   MAGNESIUM mg/dL 2 1  --      Results from last 7 days   Lab Units 08/10/20  0638   AST U/L 134*   ALT U/L 59*   ALK PHOS U/L 130 0   TOTAL PROTEIN g/dL 6 9   ALBUMIN g/dL 3 0*   TOTAL BILIRUBIN mg/dL 0 82     Results from last 7 days   Lab Units 08/13/20  0447 08/10/20  0638   GLUCOSE RANDOM mg/dL 108 101     Results from last 7 days   Lab Units 08/10/20  0638   PROTIME seconds 12 6*   INR  1 20*   PTT seconds 31     Results from last 7 days   Lab Units 08/13/20  0447 08/10/20  0638   CRP mg/L  --  32 9*   SED RATE mm/hour 82*  --      Results from last 7 days   Lab Units 08/13/20  0446 08/10/20  0638 08/10/20  0546   BLOOD CULTURE  Received in Microbiology Lab  Culture in Progress   Staphylococcus aureus* Staphylococcus aureus*  Staphylococcus coagulase negative*   GRAM STAIN RESULT   --  Gram positive cocci in clusters* Gram positive cocci in clusters*     Past Medical History:   Diagnosis Date    Abnormal Pap smear of cervix     Anxiety     Depression     Endocarditis     2018    Hepatitis C     HPV (human papilloma virus) anogenital infection      Present on Admission:   Acute bacterial endocarditis   Back pain   Bacteremia due to Staphylococcus aureus   Septic embolism ([facility])   Bipolar 1 disorder ([facility])    Admitting Diagnosis: Endocarditis [I38]  Age/Sex: 34 y o  female  Admission Orders:  Scheduled Medications:  acetaminophen, 975 mg, Oral, Q8H JEFF  cefazolin, 2,000 mg, Intravenous, Q8H  gabapentin, 100 mg, Oral, TID  ketorolac, 30 mg, Intravenous, Q6H JEFF  lidocaine, 2 patch, Topical, Daily  methocarbamol, 500 mg, Oral, Q6H JEFF  mirtazapine, 30 mg, Oral, HS    Continuous IV Infusions:  ketamine, 0 1 mg/kg/hr, Intravenous, Continuous    PRN Meds:  calcium carbonate, 1,000 mg, Oral, Daily PRN  docusate sodium, 100 mg, Oral, BID PRN  glycopyrrolate, 0 2 mg, Intravenous, Q4H PRN  haloperidol lactate, 2 mg, Intramuscular, Q30 Min PRN  LORazepam, 1 mg, Intravenous, Q1H PRN  morphine injection, 4 mg, Intravenous, Q2H PRN  ondansetron, 4 mg, Intravenous, Q6H PRN  oxyCODONE, 10 mg, Oral, Q4H PRN  oxyCODONE, 15 mg, Oral, Q4H PRN    Telemetry  IP CONSULT TO ACUTE PAIN SERVICE  IP CONSULT TO CARDIOTHORACIC SURGERY: acute bacterial endocarditis  IP CONSULT TO INFECTIOUS DISEASES    Network Utilization Review Department  [email or web address]  ATTENTION: Please call with any questions or concerns to [phone] and carefully listen to the prompts so that you are directed to the right person  All voicemails are confidential   Harlene Pair all requests for admission clinical reviews, approved or denied determinations and any other requests to dedicated fax number below belonging to the campus where the patient is receiving treatment   List of dedicated fax numbers for the Facilities:  [address] [place] [address] DENIALS (Administrative/Medical Necessity) [phone]   [address] (Maternity/NICU/Pediatrics) [phone]   [place] [phone]   [place] [phone]   [place] [phone]   [facility] [phone]   [address] [phone]   [facility]  [phone]   [address] [facility]  [address] [facility] [address] [facility] [phone]

## 2020-08-13 NOTE — ASSESSMENT & PLAN NOTE
· Patient noted to have tricuspid while vegetation on echocardiogram done in July  Patient had a transthoracic and also transesophageal echocardiogram done during the last hospital stay  · Recent CT of the abdomen and pelvis and also CT chest shows abnormalities concerning for septic emboli  · Antibiotics per Infectious Disease      · Patient is transferred over here to be evaluated by CT surgery  · Monitor respiratory status

## 2020-08-14 ENCOUNTER — APPOINTMENT (INPATIENT)
Dept: RADIOLOGY | Facility: HOSPITAL | Age: 30
DRG: 163 | End: 2020-08-14
Payer: COMMERCIAL

## 2020-08-14 LAB
ALBUMIN SERPL BCP-MCNC: 1.6 G/DL (ref 3.5–5)
ALP SERPL-CCNC: 141 U/L (ref 46–116)
ALT SERPL W P-5'-P-CCNC: 18 U/L (ref 12–78)
ANION GAP SERPL CALCULATED.3IONS-SCNC: 6 MMOL/L (ref 4–13)
AST SERPL W P-5'-P-CCNC: 45 U/L (ref 5–45)
BASOPHILS # BLD MANUAL: 0 THOUSAND/UL (ref 0–0.1)
BASOPHILS NFR MAR MANUAL: 0 % (ref 0–1)
BILIRUB SERPL-MCNC: 0.25 MG/DL (ref 0.2–1)
BUN SERPL-MCNC: 10 MG/DL (ref 5–25)
CALCIUM SERPL-MCNC: 7.8 MG/DL (ref 8.3–10.1)
CHLORIDE SERPL-SCNC: 106 MMOL/L (ref 100–108)
CO2 SERPL-SCNC: 26 MMOL/L (ref 21–32)
CREAT SERPL-MCNC: 0.55 MG/DL (ref 0.6–1.3)
EOSINOPHIL # BLD MANUAL: 0.32 THOUSAND/UL (ref 0–0.4)
EOSINOPHIL NFR BLD MANUAL: 3 % (ref 0–6)
ERYTHROCYTE [DISTWIDTH] IN BLOOD BY AUTOMATED COUNT: 14.9 % (ref 11.6–15.1)
GFR SERPL CREATININE-BSD FRML MDRD: 129 ML/MIN/1.73SQ M
GLUCOSE SERPL-MCNC: 104 MG/DL (ref 65–140)
HCT VFR BLD AUTO: 22.7 % (ref 34.8–46.1)
HGB BLD-MCNC: 7.4 G/DL (ref 11.5–15.4)
LYMPHOCYTES # BLD AUTO: 2.77 THOUSAND/UL (ref 0.6–4.47)
LYMPHOCYTES # BLD AUTO: 26 % (ref 14–44)
MCH RBC QN AUTO: 27.5 PG (ref 26.8–34.3)
MCHC RBC AUTO-ENTMCNC: 32.6 G/DL (ref 31.4–37.4)
MCV RBC AUTO: 84 FL (ref 82–98)
MONOCYTES # BLD AUTO: 0.32 THOUSAND/UL (ref 0–1.22)
MONOCYTES NFR BLD: 3 % (ref 4–12)
NEUTROPHILS # BLD MANUAL: 7.24 THOUSAND/UL (ref 1.85–7.62)
NEUTS SEG NFR BLD AUTO: 68 % (ref 43–75)
NRBC BLD AUTO-RTO: 0 /100 WBCS
PLATELET # BLD AUTO: 360 THOUSANDS/UL (ref 149–390)
PLATELET BLD QL SMEAR: ADEQUATE
PMV BLD AUTO: 9.3 FL (ref 8.9–12.7)
POTASSIUM SERPL-SCNC: 4.2 MMOL/L (ref 3.5–5.3)
PROT SERPL-MCNC: 6.6 G/DL (ref 6.4–8.2)
RBC # BLD AUTO: 2.69 MILLION/UL (ref 3.81–5.12)
RBC MORPH BLD: NORMAL
SODIUM SERPL-SCNC: 138 MMOL/L (ref 136–145)
WBC # BLD AUTO: 10.64 THOUSAND/UL (ref 4.31–10.16)

## 2020-08-14 PROCEDURE — 85007 BL SMEAR W/DIFF WBC COUNT: CPT | Performed by: NURSE PRACTITIONER

## 2020-08-14 PROCEDURE — 99232 SBSQ HOSP IP/OBS MODERATE 35: CPT | Performed by: INTERNAL MEDICINE

## 2020-08-14 PROCEDURE — 85027 COMPLETE CBC AUTOMATED: CPT | Performed by: NURSE PRACTITIONER

## 2020-08-14 PROCEDURE — 99233 SBSQ HOSP IP/OBS HIGH 50: CPT | Performed by: INTERNAL MEDICINE

## 2020-08-14 PROCEDURE — 80053 COMPREHEN METABOLIC PANEL: CPT | Performed by: NURSE PRACTITIONER

## 2020-08-14 RX ORDER — DOCUSATE SODIUM 100 MG/1
100 CAPSULE, LIQUID FILLED ORAL 2 TIMES DAILY
Status: DISCONTINUED | OUTPATIENT
Start: 2020-08-14 | End: 2020-08-15

## 2020-08-14 RX ORDER — POLYETHYLENE GLYCOL 3350 17 G/17G
17 POWDER, FOR SOLUTION ORAL DAILY PRN
Status: DISCONTINUED | OUTPATIENT
Start: 2020-08-14 | End: 2020-08-17

## 2020-08-14 RX ADMIN — MORPHINE SULFATE 4 MG: 4 INJECTION INTRAVENOUS at 16:19

## 2020-08-14 RX ADMIN — MORPHINE SULFATE 4 MG: 4 INJECTION INTRAVENOUS at 20:51

## 2020-08-14 RX ADMIN — OXYCODONE HYDROCHLORIDE 15 MG: 10 TABLET ORAL at 13:41

## 2020-08-14 RX ADMIN — KETOROLAC TROMETHAMINE 30 MG: 30 INJECTION, SOLUTION INTRAMUSCULAR at 06:03

## 2020-08-14 RX ADMIN — LORAZEPAM 1 MG: 2 INJECTION INTRAMUSCULAR; INTRAVENOUS at 01:35

## 2020-08-14 RX ADMIN — GABAPENTIN 100 MG: 100 CAPSULE ORAL at 20:53

## 2020-08-14 RX ADMIN — MORPHINE SULFATE 4 MG: 4 INJECTION INTRAVENOUS at 04:55

## 2020-08-14 RX ADMIN — MORPHINE SULFATE 4 MG: 4 INJECTION INTRAVENOUS at 01:07

## 2020-08-14 RX ADMIN — ENOXAPARIN SODIUM 40 MG: 40 INJECTION SUBCUTANEOUS at 16:39

## 2020-08-14 RX ADMIN — METHOCARBAMOL 500 MG: 500 TABLET, FILM COATED ORAL at 23:37

## 2020-08-14 RX ADMIN — GABAPENTIN 100 MG: 100 CAPSULE ORAL at 16:19

## 2020-08-14 RX ADMIN — KETOROLAC TROMETHAMINE 30 MG: 30 INJECTION, SOLUTION INTRAMUSCULAR at 17:46

## 2020-08-14 RX ADMIN — OXYCODONE HYDROCHLORIDE 15 MG: 10 TABLET ORAL at 00:00

## 2020-08-14 RX ADMIN — METHOCARBAMOL 500 MG: 500 TABLET, FILM COATED ORAL at 17:46

## 2020-08-14 RX ADMIN — OXYCODONE HYDROCHLORIDE 15 MG: 10 TABLET ORAL at 08:55

## 2020-08-14 RX ADMIN — KETOROLAC TROMETHAMINE 30 MG: 30 INJECTION, SOLUTION INTRAMUSCULAR at 11:35

## 2020-08-14 RX ADMIN — MIRTAZAPINE 30 MG: 30 TABLET, FILM COATED ORAL at 21:05

## 2020-08-14 RX ADMIN — MORPHINE SULFATE 4 MG: 4 INJECTION INTRAVENOUS at 10:23

## 2020-08-14 RX ADMIN — Medication 0.2 MG/KG/HR: at 10:21

## 2020-08-14 RX ADMIN — METHOCARBAMOL 500 MG: 500 TABLET, FILM COATED ORAL at 11:35

## 2020-08-14 RX ADMIN — KETOROLAC TROMETHAMINE 30 MG: 30 INJECTION, SOLUTION INTRAMUSCULAR at 23:37

## 2020-08-14 RX ADMIN — CEFAZOLIN SODIUM 2000 MG: 2 SOLUTION INTRAVENOUS at 08:56

## 2020-08-14 RX ADMIN — LORAZEPAM 1 MG: 2 INJECTION INTRAMUSCULAR; INTRAVENOUS at 21:43

## 2020-08-14 RX ADMIN — CEFAZOLIN SODIUM 2000 MG: 2 SOLUTION INTRAVENOUS at 15:00

## 2020-08-14 RX ADMIN — OXYCODONE HYDROCHLORIDE 15 MG: 10 TABLET ORAL at 04:08

## 2020-08-14 RX ADMIN — OXYCODONE HYDROCHLORIDE 15 MG: 10 TABLET ORAL at 19:50

## 2020-08-14 RX ADMIN — METHOCARBAMOL 500 MG: 500 TABLET, FILM COATED ORAL at 06:06

## 2020-08-14 RX ADMIN — GABAPENTIN 100 MG: 100 CAPSULE ORAL at 08:55

## 2020-08-14 RX ADMIN — DOCUSATE SODIUM 100 MG: 100 CAPSULE, LIQUID FILLED ORAL at 17:49

## 2020-08-14 RX ADMIN — CEFAZOLIN SODIUM 2000 MG: 2 SOLUTION INTRAVENOUS at 23:36

## 2020-08-14 NOTE — PROGRESS NOTES
Dr Segundo Mueller at the bedside discussing the patient's pain regimen and her willingness to participate in medical treatment  Patient was informed that she must wear the telemetry monitor and allow VS while she is on the ketamine gtt for safety reasons  Patient vocalizes understanding and states she is willing to wear the monitor and allow VS   Was informed that orders are to follow to increase the ketamine gtt rate

## 2020-08-14 NOTE — PROGRESS NOTES
Progress Note - Infectious Disease   Delmis Mendosa 34 y o  female MRN: [id number]  Unit/Bed#: [facility] 425-01 Encounter: [id number]    Impression:  1  Recurrent/persistent MSSA bacteremia with TV infective endocarditis   Blood cultures from 8/10 and 8/13 remain positive   Prolonged course of IV antibiotic was previously recommended, but patient has left AMA on 2 prior occasions (once at [facility] and once from [facility])      - continue IV cefazolin 2g Q 8 hours  - repeat blood cultures tomorrow a m to assess for clearance of bacteremia    - If pt remains bacteremic may consider switching antibiotic therapy to nafcillin or daptomycin IV (6-10mg/kg IV q24 hours)  - CT surgery service is following, and has deferred surgical intervention until patient has completed her course of antibiotic therapy  - monitor clinical response, temperature curve, WBC count      2  Tricuspid valve endocarditis, 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  7/21 MELISSA showed large vegetation on TV with severe regurgitation  7/15 CT abdomen/pelvis also showed bilateral renal parenchymal abnormalities concerning for septic emboli   No intra-abdominal abscess     -antibiotic plan as above  -CT surgery service is following      3  Recent left foot cellulitis with abscess   Likely site of injection of IV drugs versus ectopic foci in the setting of MSSA bacteremia   This appears to have healed with no evidence of active infection   -antibiotic plan as above      4  Back pain   More likely secondary to chronic pain, opioid dependence   8/1/20 Thoracic and lumbar spine MRIs performed at [facility] negative were for abscess   -monitor back pain   -Pain management is following      5  Active IV heroin abuse   This is the causative factor for development of bacteremia and infective endocarditis   Patient has left AMA on prior occasions  -patient is not a candidate for at home PICC line/IV antibiotics      6   Chronic HCV infection, mild transaminitis   Recent HIV was negative  AST/ALT improving      7  Fever   Secondary to persistent bacteremia and infective endocarditis  Temp curve is improving   -antibiotic plan as above   -monitor temperature, hemodynamics, CBC      My recommendations were discussed with the patient  Thank you for allowing me to participate in the care of this patient  The ID service will follow  Dr Darling Wayne will assume care from tomorrow      Antibiotics: cefazolin IV  Scheduled Meds:  Current Facility-Administered Medications   Medication Dose Route Frequency Provider Last Rate    acetaminophen  975 mg Oral [facility] Paige Brooks MD      calcium carbonate  1,000 mg Oral Daily PRN Paige Brooks MD      cefazolin  2,000 mg Intravenous Q8H JESUS Car-C 2,000 mg (08/14/20 0856)    docusate sodium  100 mg Oral BID Jovani Stephens MD      enoxaparin  40 mg Subcutaneous Q24H [address] Jovani Stephens MD      gabapentin  100 mg Oral TID Paige Brooks MD      glycopyrrolate  0 2 mg Intravenous Q4H PRN Cait Serum, PA-C      haloperidol lactate  2 mg Intramuscular Q30 Min PRN Cait Serum, PA-C      ketamine  0 2 mg/kg/hr Intravenous Continuous Cait Serum, PA-C 0 2 mg/kg/hr (08/14/20 1021)    ketorolac  30 mg Intravenous Q6H [address] BOO Painting      lidocaine  2 patch Topical Daily Asha Roca PA-C      LORazepam  1 mg Intravenous Q1H PRN Cait Serum, PA-C      methocarbamol  500 mg Oral Q6H [address] BOO Hernandez      mirtazapine  30 mg Oral HS Paige Brooks MD      morphine injection  4 mg Intravenous Q2H PRN BOO Hernandez      ondansetron  4 mg Intravenous Q6H PRN Paige Brooks MD      oxyCODONE  10 mg Oral Q4H PRN BOO Hernandez      oxyCODONE  15 mg Oral Q4H PRN BOO Hernandez      polyethylene glycol  17 g Oral Daily PRN Jovani Stephens MD       Continuous Infusions:ketamine, 0 2 mg/kg/hr, Last Rate: 0 2 mg/kg/hr (08/14/20 1021)      PRN Meds: calcium carbonate    glycopyrrolate    haloperidol lactate    LORazepam    morphine injection    ondansetron    oxyCODONE    oxyCODONE    polyethylene glycol    Subjective:  Patient reports continued back and leg pain which is unchanged since yesterday  She reports mild pain of her left foot without drainage  She denies fever  She reports nausea/upset stomach but no vomiting or diarrhea is reported  Objective:  Vitals:  Temp:  [98 1 °F (36 7 °C)-99 6 °F (37 6 °C)] 98 5 °F (36 9 °C)  HR:  [] 105  Resp:  [15-28] 18  BP: (111-132)/(72-82) 120/82  SpO2:  [94 %-97 %] 94 %  Temp (24hrs), Av 9 °F (37 2 °C), Min:98 1 °F (36 7 °C), Max:99 6 °F (37 6 °C)  Current: Temperature: 98 5 °F (36 9 °C)  Physical Exam:   General Appearance: Claressa Boas woman who appears her stated age, no acute distress  Throat: Oropharynx moist   Lungs:   Clear to auscultation bilaterally; respirations unlabored   Heart:  S1, S2, tachycardic, 2/6 systolic murmur left sternal border   Abdomen:   Soft, non-tender, non-distended   Extremities: No distal leg edema b/l  Healed scab LT foot     Skin: No rash     Labs, Imaging, & Other studies:   All pertinent labs and imaging studies were personally reviewed  Results from last 7 days   Lab Units 20  0943 20  0447 08/10/20  0639   WBC Thousand/uL 10 64* 9 97 5 82   HEMOGLOBIN g/dL 7 4* 7 5* 9 2*   PLATELETS Thousands/uL 360 372 322     Results from last 7 days   Lab Units 20  0943 20  0447 08/10/20  0638   SODIUM mmol/L 138 139 134   POTASSIUM mmol/L 4 2 3 1* 2 9*   CHLORIDE mmol/L 106 103 96   CO2 mmol/L 26 28 26   BUN mg/dL 10 10 15   CREATININE mg/dL 0 55* 0 40* 0 61   EGFR ml/min/1 73sq m 129 143 125   CALCIUM mg/dL 7 8* 7 9* 8 6   AST U/L 45  --  134*   ALT U/L 18  --  59*   ALK PHOS U/L 141*  --  130 0     Results from last 7 days   Lab Units 20  0446 08/10/20  0638 08/10/20  0546   BLOOD CULTURE  Staphylococcus aureus* Staphylococcus aureus* Staphylococcus aureus*  Staphylococcus coagulase negative*   GRAM STAIN RESULT  Gram positive cocci in clusters* Gram positive cocci in clusters* Gram positive cocci in clusters*         Results from last 7 days   Lab Units 08/10/20  0638   CRP mg/L 32 9*

## 2020-08-14 NOTE — PROGRESS NOTES
Progress Note - Claude Hernandez 1990, 34 y o  female MRN: [id number]    Unit/Bed#: [facility] 425-01 Encounter: [id number]    Primary Care Provider: Benoit Valentine PA-C   Date and time admitted to hospital: 8/12/2020  7:39 PM        Intravenous drug abuse [facility])  Assessment & Plan  · Patient with acute drug abuse and likely the source of bacteremia  · Patient has a history of signing against medical advise  · Pain control  · May benefit from drug rehab eventually  · During the hospital stay in July of this year patient was positive for [facility] ,cocaine and opiates  · Still pending urine drug screen   · Pt is refusing to urinate per nursing at this time I feel she will void when needed  She is demanding to have a purewick attached as she states she refuses to [employer]"  At this time we will work toward pain control  Back pain  Assessment & Plan  · Patient is complaining of severe back pain mainly in the lower part of the back and also in the sacral region  · Refuses examination due to pain  · Patient had MRI of the lumbar and thoracic spine during the recent hospital stay in [facility] which was unremarkable  But patient reports that her pain is much worse and she is screaming out saying that she is in severe pain and oxycodone is not helping her at all  Was changed to p o  Dilaudid over night     · Myself and acute pain at bedside discussed plan and pt seems to be calming down , adjustments made per acute pain recs   · - added robaxin 500 mg q 6  · - added Toradol 30 mg q 6 hrs per acute pain for 5 days monitor renal function   · - increased oxy to 10 mg and 15 mg   · - added iv morphine 4 mg q2 hrs prn for breakthrough  · -continue tylenol atc pt can refuse (she states it makes her break out in profuse sweats)   · - continue lidocaine patch  · Patient with history of intravenous drug abuse    Bipolar 1 disorder [facility])  Assessment & Plan  · Patient refused Psychiatry and neuropsych evaluation, once pain more controlled will discuss again and have their input   · Supportive care    Septic embolism [facility])  Assessment & Plan  · Patient noted to have abnormalities seen in the CT of the chest abdomen and pelvis concerning for septic emboli  · There is pleural effusion on the CT scan of the chest and also on repeat chest x-ray  · Patient is rather asymptomatic from pulmonary standpoint  · Continue with the antibiotics  (reconsulted ID)   · Thoracic surgery have evaluated the pt and she is not short of breath     8/14-patient complaining of acute right hip pain; concerning for possible embolism  Consider imaging and will discuss with acute pain service  Acute bacterial endocarditis  Assessment & Plan  · Patient noted to have tricuspid while vegetation on echocardiogram done in July  Patient had a transthoracic and also transesophageal echocardiogram done during the last hospital stay  · Recent CT of the abdomen and pelvis and also CT chest shows abnormalities concerning for septic emboli  · Antibiotics per Infectious Disease  · Patient is transferred over here to be evaluated by CT surgery  · - at the time of their evaluation pt is bilgerant and uncooperative reportedly due to pain  · - she was noncommittal to abstain from illegal drugs or unintended use of medications  · - recommendations to continue iv abx use   · - when she continues to abstain and completes iv abx treatment they would consider surgical correction   · - would benefit from neuropsych and psychiatry input  · Upon my entry to room boundaries were set as pt was rude and saying the staff were "trash" I told her that is not acceptable and that there needs to be trust and respect   She is very sick and needs to complete treatment, or she could become septic and ultimately die  The pts mother is at bedside (works at [employer]) is trying to make pt be more cooperative     · Monitor respiratory status    * Bacteremia due to Staphylococcus aureus  Assessment & Plan  · 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  Initially patient was admitted to [facility] from 07/15-blood culture were positive for MSSA bacteremia, but patient signed AMA on 07/22  Her repeat blood culture done on 7/21 was negative after 5 days  · Patient was admitted to [facility] on 07/30-blood culture came back positive for MSSA and she left again is medical advice on 08/04  Her blood cultures came back positive on 07/30, but the bacteremia cleared as of 8 /2  Patient left AMA on 08/04  · Patient was readmitted to [address] [place] [address] on 08/10-blood culture came back positive for Staph aureus  Currently on cefazolin per Infectious Disease  · 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 during the fast hospital stay from 7/15-7/22  · Continue with the antibiotics per Infectious Disease  · Repeat blood culture (pt has been refusing sticks extensive discussion had with the pt and the nursing staff to have the best person obtaining her blood)   · tmax 101  · CT surgery appreciated  · Patient also with previous history of MSSA bacteremia /possible endocarditis in 2018-    8/14-patient had 1 blood culture drawn yesterday; tentatively positive for staph, awaiting speciation is  Continue current treatment plan  - remains afebrile although tachycardic; will discuss with patient alone further cultures to be drawn tomorrow      VTE Pharmacologic Prophylaxis:   Pharmacologic: Enoxaparin (Lovenox)  Mechanical VTE Prophylaxis in Place: No    Patient Centered Rounds: I have performed bedside rounds with nursing staff today  Discussions with Specialists or Other Care Team Provider:  Acute pain service    Education and Discussions with Family / Patient: Care plan discussed with patient who voiced understanding and agrees with recommendations  Time Spent for Care: 30 minutes    More than 50% of total time spent on counseling and coordination of care as described above  Current Length of Stay: 2 day(s)    Current Patient Status: Inpatient   Certification Statement: The patient will continue to require additional inpatient hospital stay due to Treatment of bacteremia and right hip pain    Discharge Plan: To be determined    Code Status: Level 1 - Full Code      Subjective:   Patient seen examined bedside, reports 9/did patent right hip that started this morning  Will image with CT; appreciate acute pain service recommendations  Patient now on ketamine drip; noncompliant with therapy as she is resisting physical therapy, nursing interventions, and labs  Will attempt to get repeat blood cultures tomorrow as blood culture  was positive for staph yesterday  Continue IV antibiotics  No CT plan at this time as patient reports she will not cease from using illicit drugs; will likely need 6 weeks of therapy and a acute rehab facility  Objective:     Vitals:   Temp (24hrs), Av 9 °F (37 2 °C), Min:98 1 °F (36 7 °C), Max:99 6 °F (37 6 °C)    Temp:  [98 1 °F (36 7 °C)-99 6 °F (37 6 °C)] 98 1 °F (36 7 °C)  HR:  [] 108  Resp:  [14-28] 15  BP: (111-132)/(72-82) 130/81  SpO2:  [94 %-97 %] 97 %  Body mass index is 23 3 kg/m²  Input and Output Summary (last 24 hours): Intake/Output Summary (Last 24 hours) at 2020 1516  Last data filed at 2020 1135  Gross per 24 hour   Intake 1702 65 ml   Output 0 ml   Net 1702 65 ml       Physical Exam:     Physical Exam  Vitals signs and nursing note reviewed  Constitutional:       Comments: Distressed appearance   HENT:      Head: Normocephalic and atraumatic  Right Ear: External ear normal       Left Ear: External ear normal       Nose: Nose normal       Mouth/Throat:      Mouth: Mucous membranes are moist    Eyes:      Extraocular Movements: Extraocular movements intact        Conjunctiva/sclera: Conjunctivae normal       Pupils: Pupils are equal, round, and reactive to light  Neck:      Musculoskeletal: Normal range of motion and neck supple  Cardiovascular:      Rate and Rhythm: Tachycardia present  Musculoskeletal:         General: Tenderness present  Comments: Pain and right hip with reduced range of motion   Skin:     General: Skin is warm and dry  Neurological:      Mental Status: She is alert and oriented to person, place, and time  Psychiatric:      Comments: Anxious; dysphoric affect           Additional Data:     Labs:    Results from last 7 days   Lab Units 08/14/20  0943 08/13/20  0447   WBC Thousand/uL 10 64* 9 97   HEMOGLOBIN g/dL 7 4* 7 5*   HEMATOCRIT % 22 7* 23 5*   PLATELETS Thousands/uL 360 372   NEUTROS PCT %  --  60   LYMPHS PCT %  --  29   LYMPHO PCT % 26  --    MONOS PCT %  --  8   MONO PCT % 3*  --    EOS PCT % 3 2     Results from last 7 days   Lab Units 08/14/20  0943   SODIUM mmol/L 138   POTASSIUM mmol/L 4 2   CHLORIDE mmol/L 106   CO2 mmol/L 26   BUN mg/dL 10   CREATININE mg/dL 0 55*   ANION GAP mmol/L 6   CALCIUM mg/dL 7 8*   ALBUMIN g/dL 1 6*   TOTAL BILIRUBIN mg/dL 0 25   ALK PHOS U/L 141*   ALT U/L 18   AST U/L 45   GLUCOSE RANDOM mg/dL 104     Results from last 7 days   Lab Units 08/10/20  0638   INR  1 20*                       * I Have Reviewed All Lab Data Listed Above  * Additional Pertinent Lab Tests Reviewed:  All Labs Within Last 24 Hours Reviewed    Imaging:    Imaging Reports Reviewed Today Include:  CTA chest  Imaging Personally Reviewed by Myself Includes:  Chest x-ray    Recent Cultures (last 7 days):     Results from last 7 days   Lab Units 08/13/20  0446 08/10/20  0638 08/10/20  0546   BLOOD CULTURE  Staphylococcus aureus* Staphylococcus aureus* Staphylococcus aureus*  Staphylococcus coagulase negative*   GRAM STAIN RESULT  Gram positive cocci in clusters* Gram positive cocci in clusters* Gram positive cocci in clusters*       Last 24 Hours Medication List:   Current Facility-Administered Medications   Medication Dose Route Frequency Provider Last Rate    acetaminophen  975 mg Oral [facility] Diane Pereira MD      calcium carbonate  1,000 mg Oral Daily PRN Diane Pereira MD      cefazolin  2,000 mg Intravenous Q8H Asha Roca PA-C 2,000 mg (08/14/20 0856)    docusate sodium  100 mg Oral BID Keely Barrientos MD      gabapentin  100 mg Oral TID Diane Pereira MD      glycopyrrolate  0 2 mg Intravenous Q4H PRN Mamta Ribera PA-C      haloperidol lactate  2 mg Intramuscular Q30 Min PRN Mamta Ribera PA-C      ketamine  0 2 mg/kg/hr Intravenous Continuous Mamta Ribera PA-C 0 2 mg/kg/hr (08/14/20 1021)    ketorolac  30 mg Intravenous Q6H [address] Maya Garza, BOO      lidocaine  2 patch Topical Daily Asha Roca PA-C      LORazepam  1 mg Intravenous Q1H PRN Mamta Ribera PA-C      methocarbamol  500 mg Oral Q6H [address] Zhang Settler, CRNP      mirtazapine  30 mg Oral HS Diane Pereira MD      morphine injection  4 mg Intravenous Q2H PRN Zhang Settler, CRNP      ondansetron  4 mg Intravenous Q6H PRN Diane Pereira MD      oxyCODONE  10 mg Oral Q4H PRN Zhang Settler, CRNP      oxyCODONE  15 mg Oral Q4H PRN Zhang Settler, CRNP      polyethylene glycol  17 g Oral Daily PRN Keely Barrientos MD          Today, Patient Was Seen By: Edwina Umana MD    ** Please Note: Dictation voice to text software may have been used in the creation of this document   **

## 2020-08-14 NOTE — ASSESSMENT & PLAN NOTE
· Patient is complaining of severe back pain mainly in the lower part of the back and also in the sacral region  · Refuses examination due to pain  · Patient had MRI of the lumbar and thoracic spine during the recent hospital stay in [facility] which was unremarkable  But patient reports that her pain is much worse and she is screaming out saying that she is in severe pain and oxycodone is not helping her at all  Was changed to p o  Dilaudid over night     · Myself and acute pain at bedside discussed plan and pt seems to be calming down , adjustments made per acute pain recs   · - added robaxin 500 mg q 6  · - added Toradol 30 mg q 6 hrs per acute pain for 5 days monitor renal function   · - increased oxy to 10 mg and 15 mg   · - added iv morphine 4 mg q2 hrs prn for breakthrough  · -continue tylenol atc pt can refuse (she states it makes her break out in profuse sweats)   · - continue lidocaine patch  · Patient with history of intravenous drug abuse

## 2020-08-14 NOTE — PLAN OF CARE
Problem: Prexisting or High Potential for Compromised Skin Integrity  Goal: Skin integrity is maintained or improved  Description: INTERVENTIONS:  - Identify patients at risk for skin breakdown  - Assess and monitor skin integrity  - Assess and monitor nutrition and hydration status  - Monitor labs   - Assess for incontinence   - Turn and reposition patient  - Assist with mobility/ambulation  - Relieve pressure over bony prominences  - Avoid friction and shearing  - Provide appropriate hygiene as needed including keeping skin clean and dry  - Evaluate need for skin moisturizer/barrier cream  - Collaborate with interdisciplinary team   - Patient/family teaching  - Consider wound care consult   Outcome: Progressing     Problem: PAIN - ADULT  Goal: Verbalizes/displays adequate comfort level or baseline comfort level  Description: Interventions:  - Encourage patient to monitor pain and request assistance  - Assess pain using appropriate pain scale  - Administer analgesics based on type and severity of pain and evaluate response  - Implement non-pharmacological measures as appropriate and evaluate response  - Consider cultural and social influences on pain and pain management  - Notify physician/advanced practitioner if interventions unsuccessful or patient reports new pain  Outcome: Progressing     Problem: INFECTION - ADULT  Goal: Absence or prevention of progression during hospitalization  Description: INTERVENTIONS:  - Assess and monitor for signs and symptoms of infection  - Monitor lab/diagnostic results  - Monitor all insertion sites, i e  indwelling lines, tubes, and drains  - Monitor endotracheal if appropriate and nasal secretions for changes in amount and color  - [place] appropriate cooling/warming therapies per order  - Administer medications as ordered  - Instruct and encourage patient and family to use good hand hygiene technique  - Identify and instruct in appropriate isolation precautions for identified infection/condition  Outcome: Progressing  Goal: Absence of fever/infection during neutropenic period  Description: INTERVENTIONS:  - Monitor WBC    Outcome: Progressing     Problem: SAFETY ADULT  Goal: Patient will remain free of falls  Description: INTERVENTIONS:  - Assess patient frequently for physical needs  -  Identify cognitive and physical deficits and behaviors that affect risk of falls    -  [place] fall precautions as indicated by assessment   - Educate patient/family on patient safety including physical limitations  - Instruct patient to call for assistance with activity based on assessment  - Modify environment to reduce risk of injury  - Consider OT/PT consult to assist with strengthening/mobility  Outcome: Progressing  Goal: Maintain or return to baseline ADL function  Description: INTERVENTIONS:  -  Assess patient's ability to carry out ADLs; assess patient's baseline for ADL function and identify physical deficits which impact ability to perform ADLs (bathing, care of mouth/teeth, toileting, grooming, dressing, etc )  - Assess/evaluate cause of self-care deficits   - Assess range of motion  - Assess patient's mobility; develop plan if impaired  - Assess patient's need for assistive devices and provide as appropriate  - Encourage maximum independence but intervene and supervise when necessary  - Involve family in performance of ADLs  - Assess for home care needs following discharge   - Consider OT consult to assist with ADL evaluation and planning for discharge  - Provide patient education as appropriate  Outcome: Progressing  Goal: Maintain or return mobility status to optimal level  Description: INTERVENTIONS:  - Assess patient's baseline mobility status (ambulation, transfers, stairs, etc )    - Identify cognitive and physical deficits and behaviors that affect mobility  - Identify mobility aids required to assist with transfers and/or ambulation (gait belt, sit-to-stand, lift, walker, cane, etc )  - [place] fall precautions as indicated by assessment  - Record patient progress and toleration of activity level on Mobility SBAR; progress patient to next Phase/Stage  - Instruct patient to call for assistance with activity based on assessment  - Consider rehabilitation consult to assist with strengthening/weightbearing, etc   Outcome: Progressing     Problem: CARDIOVASCULAR - ADULT  Goal: Maintains optimal cardiac output and hemodynamic stability  Description: INTERVENTIONS:  - Monitor I/O, vital signs and rhythm  - Monitor for S/S and trends of decreased cardiac output  - Administer and titrate ordered vasoactive medications to optimize hemodynamic stability  - Assess quality of pulses, skin color and temperature  - Assess for signs of decreased coronary artery perfusion  - Instruct patient to report change in severity of symptoms  Outcome: Progressing  Goal: Absence of cardiac dysrhythmias or at baseline rhythm  Description: INTERVENTIONS:  - Continuous cardiac monitoring, vital signs, obtain 12 lead EKG if ordered  - Administer antiarrhythmic and heart rate control medications as ordered  - Monitor electrolytes and administer replacement therapy as ordered  Outcome: Progressing     Problem: METABOLIC, FLUID AND ELECTROLYTES - ADULT  Goal: Electrolytes maintained within normal limits  Description: INTERVENTIONS:  - Monitor labs and assess patient for signs and symptoms of electrolyte imbalances  - Administer electrolyte replacement as ordered  - Monitor response to electrolyte replacements, including repeat lab results as appropriate  - Instruct patient on fluid and nutrition as appropriate  Outcome: Progressing  Goal: Fluid balance maintained  Description: INTERVENTIONS:  - Monitor labs   - Monitor I/O and WT  - Instruct patient on fluid and nutrition as appropriate  - Assess for signs & symptoms of volume excess or deficit  Outcome: Progressing  Goal: Glucose maintained within target range  Description: INTERVENTIONS:  - Monitor Blood Glucose as ordered  - Assess for signs and symptoms of hyperglycemia and hypoglycemia  - Administer ordered medications to maintain glucose within target range  - Assess nutritional intake and initiate nutrition service referral as needed  Outcome: Progressing     Problem: HEMATOLOGIC - ADULT  Goal: Maintains hematologic stability  Description: INTERVENTIONS  - Assess for signs and symptoms of bleeding or hemorrhage  - Monitor labs  - Administer supportive blood products/factors as ordered and appropriate  Outcome: Progressing     Problem: Potential for Falls  Goal: Patient will remain free of falls  Description: INTERVENTIONS:  - Assess patient frequently for physical needs  -  Identify cognitive and physical deficits and behaviors that affect risk of falls    -  [place] fall precautions as indicated by assessment   - Educate patient/family on patient safety including physical limitations  - Instruct patient to call for assistance with activity based on assessment  - Modify environment to reduce risk of injury  - Consider OT/PT consult to assist with strengthening/mobility  Outcome: Progressing

## 2020-08-14 NOTE — PROGRESS NOTES
Patient is reporting to have increased pain in right hip that is described as "unbearable" and effecting her ability to ambulate or even move the joint  Assessed patient and she is noted to be sleepy but responsive  Her joint is assessed to have complete mobility  No changes to VS which remain stable at this time  Offered to assist patient out of bed or reposition  Patient rolled to her side refusing to get out of bed  Offered to assist patient to bathroom to void, even offered the bedpan  Patient reports that they refuse to void until the doctor gives her more pain medication  Patient insisted that APS come to the bedside  Notified Dr Machado Gravely with APS and was notified that patient will be seen

## 2020-08-14 NOTE — PROGRESS NOTES
Progress Note - Acute Pain Service    Jennifer Salazar 34 y o  female MRN: [id number]  Unit/Bed#: [facility] 425-01 Encounter: [id number]      Assessment:   Principal Problem:    Bacteremia due to Staphylococcus aureus  Active Problems:    Acute bacterial endocarditis    Septic embolism (HCC)    Bipolar 1 disorder (HCC)    Back pain    Intravenous drug abuse ([facility] [address] )      Plan:   · Continue acetaminophen 975 mg p o  q 8 hours scheduled  · Continue ketorolac 30 mg IV q 6 hours scheduled  · Continue oxycodone 10 mg p o  q 4 hours p r n  moderate pain  · Continue oxycodone 15 mg p o  q 4 hours p r n  severe pain  · Continue morphine 4 mg IV q 2 hours p r n  breakthrough pain  · Continue ketamine infusion, increased to 0 2 mg/kg/hr this morning  · Continue gabapentin 100 mg p o  t i d  scheduled  · Continue methocarbamol 500 mg p o  q 6 hours scheduled  · Continue lidocaine 5% patch, 2 patches to lower back for 12 hours daily  · Continue bowel regimen to avoid opioid induced constipation  · Discussed with patient need to allow lab draws, vital signs and telemetry monitoring for her safety and that, should she refuse these things, ketamine will be discontinued  She agreed to allow these things  · Suggest imaging of right hip to evaluate for source of patient's pain  APS will continue to follow; please contact APS ( [phone]) with any further questions    Pain History  Current pain location(s):  Right hip  Pain Scale:   10/10  Quality:  Excruciating  24 hour history:  Patient seen in consult by acute pain service yesterday complaining of right hip pain with insidious onset and worsening of her past 5-6 days  No history of trauma  Ketamine infusion began at 0 1 mg/kg/hr yesterday with improvement in pain  Patient states her pain worsened again overnight and ketamine was increased to 0 2 mg/kg/h this morning  Patient states her pain is improved but is still a 10/10    Patient appears more comfortable this morning and agrees that she feels more comfortable  He denies nausea, vomiting, headache, visual changes, hallucinations, dysphoria, abdominal pain, constipation, diarrhea, tremors, diaphoresis or any other signs of opioid withdrawal     Overnight, patient refusing blood draws, telemetry monitoring, vital signs and refusing to urinate  Seen by internal medicine physician assistant several times throughout the night  I had a discussion this morning with the patient regarding her safety specifically with her current medication regimen and that telemetry monitoring, laboratory studies and vital signs are all very important and that, should she refuse numb, some of the medications would need to be stopped  She agreed to allow the interventions at that time  Opioid requirement previous 24 hours:  Oxycodone 90 mg p o , morphine 28 mg IV       Meds/Allergies   all current active meds have been reviewed, current meds:   Current Facility-Administered Medications   Medication Dose Route Frequency    acetaminophen (TYLENOL) tablet 975 mg  975 mg Oral Q8H [facility]    calcium carbonate (TUMS) chewable tablet 1,000 mg  1,000 mg Oral Daily PRN    ceFAZolin (ANCEF) IVPB (premix) 2,000 mg 50 mL  2,000 mg Intravenous Q8H    docusate sodium (COLACE) capsule 100 mg  100 mg Oral BID PRN    gabapentin (NEURONTIN) capsule 100 mg  100 mg Oral TID    glycopyrrolate (ROBINUL) injection 0 2 mg  0 2 mg Intravenous Q4H PRN    haloperidol lactate (HALDOL) injection 2 mg  2 mg Intramuscular Q30 Min PRN    ketamine 250 mg (STANDARD CONCENTRATION) IV in sodium chloride 0 9% 250 mL  0 2 mg/kg/hr Intravenous Continuous    ketorolac (TORADOL) injection 30 mg  30 mg Intravenous Q6H [facility]    lidocaine (LIDODERM) 5 % patch 2 patch  2 patch Topical Daily    LORazepam (ATIVAN) injection 1 mg  1 mg Intravenous Q1H PRN    methocarbamol (ROBAXIN) tablet 500 mg  500 mg Oral Q6H [facility]    mirtazapine (REMERON) tablet 30 mg  30 mg Oral HS    morphine (PF) 4 mg/mL injection 4 mg  4 mg Intravenous Q2H PRN    ondansetron (ZOFRAN) injection 4 mg  4 mg Intravenous Q6H PRN    oxyCODONE (ROXICODONE) immediate release tablet 10 mg  10 mg Oral Q4H PRN    oxyCODONE (ROXICODONE) IR tablet 15 mg  15 mg Oral Q4H PRN    and PTA meds:   None       Allergies   Allergen Reactions    Cat Hair Extract     Dog Epithelium     Latex     Pollen Extract        Objective     Temp:  [98 1 °F (36 7 °C)-99 6 °F (37 6 °C)] 98 1 °F (36 7 °C)  HR:  [] 108  Resp:  [14-28] 15  BP: (111-132)/(68-84) 130/81    Physical Exam  Vitals signs and nursing note reviewed  Constitutional:       General: She is awake  She is not in acute distress  Eyes:      Conjunctiva/sclera: Conjunctivae normal       Pupils: Pupils are equal, round, and reactive to light  Cardiovascular:      Rate and Rhythm: Normal rate and regular rhythm  Musculoskeletal:      Right hip: She exhibits decreased range of motion and tenderness  She exhibits no crepitus and no deformity  Skin:     General: Skin is warm and dry  Neurological:      General: No focal deficit present  Mental Status: She is alert and oriented to person, place, and time  GCS: GCS eye subscore is 4  GCS verbal subscore is 5  GCS motor subscore is 6  Psychiatric:         Attention and Perception: Attention and perception normal          Mood and Affect: Mood is anxious  Speech: Speech normal          Behavior: Behavior normal  Behavior is cooperative           Lab Results:   Results from last 7 days   Lab Units 08/14/20  0943   WBC Thousand/uL 10 64*   HEMOGLOBIN g/dL 7 4*   HEMATOCRIT % 22 7*   PLATELETS Thousands/uL 360      Results from last 7 days   Lab Units 08/14/20  0943   POTASSIUM mmol/L 4 2   CHLORIDE mmol/L 106   CO2 mmol/L 26   BUN mg/dL 10   CREATININE mg/dL 0 55*   CALCIUM mg/dL 7 8*   ALK PHOS U/L 141*   ALT U/L 18   AST U/L 45       Imaging Studies: I have personally reviewed pertinent reports  EKG, Pathology, and Other Studies: I have personally reviewed pertinent reports  Counseling / Coordination of Care  Total floor / unit time spent today 30 minutes  Greater than 50% of total time was spent with the patient and / or family counseling and / or coordination of care  A description of the counseling / coordination of care:  Patient interview, physical examination, review of medical record, review of imaging and laboratory data, development of pain management plan, discussion of pain management plan with patient and primary service      Lajuan Mcardle, PA-C

## 2020-08-14 NOTE — PROGRESS NOTES
Patient still not willing to void  Per Yuki Warren from [facility], continue to encourage fluids and monitor urine output overnight into the morning

## 2020-08-14 NOTE — ASSESSMENT & PLAN NOTE
· Patient with acute drug abuse and likely the source of bacteremia  · Patient has a history of signing against medical advise  · Pain control  · May benefit from drug rehab eventually  · During the hospital stay in July of this year patient was positive for [facility] ,cocaine and opiates  · Still pending urine drug screen   · Pt is refusing to urinate per nursing at this time I feel she will void when needed  She is demanding to have a purewick attached as she states she refuses to [employer]"  At this time we will work toward pain control

## 2020-08-14 NOTE — PROGRESS NOTES
Patient willing to be bladder scanned  Bladder scan around midnight was 272 ml  Pt still refusing to urinate  Pt encouraged to continue drinking fluids considering how patient was refusing most of the day  New IV access placed by [facility], RN  Ketamine restarted  Around 0030 patient was yelling from her room, "Help! I'm in pain! I can't take it anymore!" Upon assessment, patient stated after placing ice packs under her buttocks the pain worsened on her right hip and b/l buttocks, lower back  Pt unable to be consoled at that time  Pt also demanding to see a Doctor  Erma Parsons [facility] asked to come to bedside  PRN Morphine given  Okay to administer PRN Ativan afterwards  Will continue to monitor

## 2020-08-14 NOTE — SOCIAL WORK
Pt is <30-day readmit  Pt's last admit was 7/15/20 - 7/22/20 at [facility]  Pt is now transferred to [facility] from [facility] where pt was originally re-admitted on 8/10/20 after being transferred there from [facility] ED where pt was initially brought on 8/10/20  CM obtained all the following info about the pt  HOME: Pt resides in a 2-story house w/ 13 steps between floors and 2 steps to enter at front door  LIVES W/: Mother, step-father, half-brother, and pt's 3-yr-old daughter  : Pt's mother Rodrigo Palacio ([phone])  INDEPENDENCE: Pt is fully independent at baseline w/ ambulating and performing ADLS  DME: None reported  HHC: No hx of services reported  I/P REHAB: No hx of placements reported  MENTAL HEALTH ISSUES: Pt has Dx of Type 1 Bipolar Disorder which is managed by her PCP  Pt reported she used to have a dedicated psychiatrist and therapist, but stopped seeing them a few yrs ago  Pt reported no hx of i/p psych treatment  D&A ISSUES: Pt is actively abusing heroin  Pt reported she has hx of i/p D&A treatment at [facility] located in [address] in Oct 2018  PCP: [facility] through [facility] located in [facility]  PHARMACY: [facility]  located on [place] in [address]  INCOME SOURCE: Pt is currently furloughed from her part-time job due to the current COVID-19 Pandemic  Pt is currently receiving  benefits for income while furloughed  INSURANCE: [employer] managed Medical Assistance plan for healthcare coverage and soup.me plan for Rx coverage  MEDICAL POA: No one is legally pre-designated  TRANSPORTATION AT D/C: Pt's family members will provide transport      CM reviewed d/c planning process including the following: identifying help at home, patient preference for d/c planning needs, Discharge Lounge, [facility] Meds to Bed program, availability of treatment team to discuss questions or concerns patient and/or family may have regarding understanding medications and recognizing signs and symptoms once discharged  CM also encouraged patient to follow up with all recommended appointments after discharge  Patient advised of importance for patient and family to participate in managing patients medical well being  Patient/caregiver received discharge checklist  Content reviewed  Patient/caregiver encouraged to participate in discharge plan of care prior to discharge home

## 2020-08-14 NOTE — ASSESSMENT & PLAN NOTE
· 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  Initially patient was admitted to [facility] from 07/15-blood culture were positive for MSSA bacteremia, but patient signed AMA on 07/22  Her repeat blood culture done on 7/21 was negative after 5 days  · Patient was admitted to [facility] on 07/30-blood culture came back positive for MSSA and she left again is medical advice on 08/04  Her blood cultures came back positive on 07/30, but the bacteremia cleared as of 8 /2  Patient left AMA on 08/04  · Patient was readmitted to [address] [place] [address] on 08/10-blood culture came back positive for Staph aureus  Currently on cefazolin per Infectious Disease  · 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 during the fast hospital stay from 7/15-7/22  · Continue with the antibiotics per Infectious Disease  · Repeat blood culture (pt has been refusing sticks extensive discussion had with the pt and the nursing staff to have the best person obtaining her blood)   · tmax 101  · CT surgery appreciated  · Patient also with previous history of MSSA bacteremia /possible endocarditis in 2018-    8/14-patient had 1 blood culture drawn yesterday; tentatively positive for staph, awaiting speciation is  Continue current treatment plan    - remains afebrile although tachycardic; will discuss with patient alone further cultures to be drawn tomorrow

## 2020-08-14 NOTE — PROGRESS NOTES
Dr Ying Space at bedside discussing the patient's reports of increased pain and request to have more interventions  No new orders at this time

## 2020-08-14 NOTE — PROGRESS NOTES
Patient was noted to be in bed and attempting to refuse the collection of VS and had previously refused to have labs drawn  Educated patient on the danger and risk associated with being on ketamine drip without proper monitoring  Patient agreed to allow VS to be obtained  Offered to assist patient to chair but she refused despite education on the potential for deconditioning

## 2020-08-14 NOTE — SOCIAL WORK
Pt was actively abusing heroin prior to admit  Pt has 3-yr-old daughter, who is currently being watched by pt's mother, however pt is primary caregiver for her daughter and resides w/ her in same household  CM is a mandated   CLIF contacted [facility] ([phone]) to make referral  and spoke to  Billy Landa /  ID# [id number]  The case will be sent to [address] [place] [address] for investigation  CM to follow

## 2020-08-14 NOTE — PROGRESS NOTES
After hanging patient's Ancef, patient started to say she felt a burning sensation  Upon assessment, the site looked infiltrated  IV medications put on hold  Pt also has been refusing to urinate since 7 am this morning and have bladder scans performed  Asha from [address] [place] [address] made aware

## 2020-08-14 NOTE — ASSESSMENT & PLAN NOTE
· Patient noted to have abnormalities seen in the CT of the chest abdomen and pelvis concerning for septic emboli  · There is pleural effusion on the CT scan of the chest and also on repeat chest x-ray  · Patient is rather asymptomatic from pulmonary standpoint  · Continue with the antibiotics  (reconsulted ID)   · Thoracic surgery have evaluated the pt and she is not short of breath     8/14-patient complaining of acute right hip pain; concerning for possible embolism  Consider imaging and will discuss with acute pain service

## 2020-08-15 ENCOUNTER — APPOINTMENT (INPATIENT)
Dept: RADIOLOGY | Facility: HOSPITAL | Age: 30
DRG: 163 | End: 2020-08-15
Payer: COMMERCIAL

## 2020-08-15 LAB
ANION GAP SERPL CALCULATED.3IONS-SCNC: 5 MMOL/L (ref 4–13)
BUN SERPL-MCNC: 9 MG/DL (ref 5–25)
CALCIUM SERPL-MCNC: 8 MG/DL (ref 8.3–10.1)
CHLORIDE SERPL-SCNC: 105 MMOL/L (ref 100–108)
CO2 SERPL-SCNC: 26 MMOL/L (ref 21–32)
CREAT SERPL-MCNC: 0.47 MG/DL (ref 0.6–1.3)
ERYTHROCYTE [DISTWIDTH] IN BLOOD BY AUTOMATED COUNT: 14.8 % (ref 11.6–15.1)
GFR SERPL CREATININE-BSD FRML MDRD: 136 ML/MIN/1.73SQ M
GLUCOSE SERPL-MCNC: 97 MG/DL (ref 65–140)
HCT VFR BLD AUTO: 22.7 % (ref 34.8–46.1)
HGB BLD-MCNC: 7.3 G/DL (ref 11.5–15.4)
MCH RBC QN AUTO: 27.1 PG (ref 26.8–34.3)
MCHC RBC AUTO-ENTMCNC: 32.2 G/DL (ref 31.4–37.4)
MCV RBC AUTO: 84 FL (ref 82–98)
PLATELET # BLD AUTO: 359 THOUSANDS/UL (ref 149–390)
PMV BLD AUTO: 8.7 FL (ref 8.9–12.7)
POTASSIUM SERPL-SCNC: 4.2 MMOL/L (ref 3.5–5.3)
RBC # BLD AUTO: 2.69 MILLION/UL (ref 3.81–5.12)
SODIUM SERPL-SCNC: 136 MMOL/L (ref 136–145)
WBC # BLD AUTO: 10.92 THOUSAND/UL (ref 4.31–10.16)

## 2020-08-15 PROCEDURE — 99232 SBSQ HOSP IP/OBS MODERATE 35: CPT | Performed by: INTERNAL MEDICINE

## 2020-08-15 PROCEDURE — 36569 INSJ PICC 5 YR+ W/O IMAGING: CPT

## 2020-08-15 PROCEDURE — 73701 CT LOWER EXTREMITY W/DYE: CPT

## 2020-08-15 PROCEDURE — 80048 BASIC METABOLIC PNL TOTAL CA: CPT | Performed by: INTERNAL MEDICINE

## 2020-08-15 PROCEDURE — 05HY33Z INSERTION OF INFUSION DEVICE INTO UPPER VEIN, PERCUTANEOUS APPROACH: ICD-10-PCS | Performed by: INTERNAL MEDICINE

## 2020-08-15 PROCEDURE — 99233 SBSQ HOSP IP/OBS HIGH 50: CPT | Performed by: ANESTHESIOLOGY

## 2020-08-15 PROCEDURE — 85027 COMPLETE CBC AUTOMATED: CPT | Performed by: INTERNAL MEDICINE

## 2020-08-15 PROCEDURE — 87040 BLOOD CULTURE FOR BACTERIA: CPT | Performed by: INTERNAL MEDICINE

## 2020-08-15 PROCEDURE — C1751 CATH, INF, PER/CENT/MIDLINE: HCPCS

## 2020-08-15 RX ORDER — SENNOSIDES 8.6 MG
1 TABLET ORAL 2 TIMES DAILY
Status: DISCONTINUED | OUTPATIENT
Start: 2020-08-15 | End: 2020-09-28 | Stop reason: HOSPADM

## 2020-08-15 RX ADMIN — METHOCARBAMOL 500 MG: 500 TABLET, FILM COATED ORAL at 17:52

## 2020-08-15 RX ADMIN — ACETAMINOPHEN 975 MG: 325 TABLET, FILM COATED ORAL at 13:26

## 2020-08-15 RX ADMIN — KETOROLAC TROMETHAMINE 30 MG: 30 INJECTION, SOLUTION INTRAMUSCULAR at 17:52

## 2020-08-15 RX ADMIN — MORPHINE SULFATE 4 MG: 4 INJECTION INTRAVENOUS at 01:35

## 2020-08-15 RX ADMIN — GABAPENTIN 100 MG: 100 CAPSULE ORAL at 17:51

## 2020-08-15 RX ADMIN — MORPHINE SULFATE 4 MG: 4 INJECTION INTRAVENOUS at 15:12

## 2020-08-15 RX ADMIN — MORPHINE SULFATE 4 MG: 4 INJECTION INTRAVENOUS at 05:10

## 2020-08-15 RX ADMIN — MORPHINE SULFATE 4 MG: 4 INJECTION INTRAVENOUS at 23:50

## 2020-08-15 RX ADMIN — IOHEXOL 100 ML: 350 INJECTION, SOLUTION INTRAVENOUS at 20:52

## 2020-08-15 RX ADMIN — KETOROLAC TROMETHAMINE 30 MG: 30 INJECTION, SOLUTION INTRAMUSCULAR at 23:52

## 2020-08-15 RX ADMIN — Medication 0.2 MG/KG/HR: at 15:11

## 2020-08-15 RX ADMIN — GABAPENTIN 100 MG: 100 CAPSULE ORAL at 09:04

## 2020-08-15 RX ADMIN — SENNOSIDES 8.6 MG: 8.6 TABLET ORAL at 12:00

## 2020-08-15 RX ADMIN — ENOXAPARIN SODIUM 40 MG: 40 INJECTION SUBCUTANEOUS at 09:04

## 2020-08-15 RX ADMIN — CEFAZOLIN SODIUM 2000 MG: 2 SOLUTION INTRAVENOUS at 06:30

## 2020-08-15 RX ADMIN — METHOCARBAMOL 500 MG: 500 TABLET, FILM COATED ORAL at 12:00

## 2020-08-15 RX ADMIN — KETOROLAC TROMETHAMINE 30 MG: 30 INJECTION, SOLUTION INTRAMUSCULAR at 05:07

## 2020-08-15 RX ADMIN — OXYCODONE HYDROCHLORIDE 15 MG: 10 TABLET ORAL at 13:26

## 2020-08-15 RX ADMIN — CEFAZOLIN SODIUM 2000 MG: 2 SOLUTION INTRAVENOUS at 15:10

## 2020-08-15 RX ADMIN — METHOCARBAMOL 500 MG: 500 TABLET, FILM COATED ORAL at 05:10

## 2020-08-15 RX ADMIN — MORPHINE SULFATE 4 MG: 4 INJECTION INTRAVENOUS at 20:07

## 2020-08-15 RX ADMIN — OXYCODONE HYDROCHLORIDE 15 MG: 10 TABLET ORAL at 09:03

## 2020-08-15 RX ADMIN — OXYCODONE HYDROCHLORIDE 15 MG: 10 TABLET ORAL at 17:51

## 2020-08-15 RX ADMIN — GABAPENTIN 100 MG: 100 CAPSULE ORAL at 20:07

## 2020-08-15 RX ADMIN — METHOCARBAMOL 500 MG: 500 TABLET, FILM COATED ORAL at 23:52

## 2020-08-15 RX ADMIN — DOCUSATE SODIUM 100 MG: 100 CAPSULE, LIQUID FILLED ORAL at 09:04

## 2020-08-15 RX ADMIN — SENNOSIDES 8.6 MG: 8.6 TABLET ORAL at 17:52

## 2020-08-15 RX ADMIN — MIRTAZAPINE 30 MG: 30 TABLET, FILM COATED ORAL at 22:00

## 2020-08-15 RX ADMIN — CEFAZOLIN SODIUM 2000 MG: 2 SOLUTION INTRAVENOUS at 23:50

## 2020-08-15 NOTE — ASSESSMENT & PLAN NOTE
· Patient is complaining of severe back pain mainly in the lower part of the back and also in the sacral region  · Refuses examination due to pain  · Patient had MRI of the lumbar and thoracic spine during the recent hospital stay in [facility] which was unremarkable  But patient reports that her pain is much worse and she is screaming out saying that she is in severe pain and oxycodone is not helping her at all  Was changed to p o  Dilaudid over night     · Myself and acute pain at bedside discussed plan and pt seems to be calming down , adjustments made per acute pain recs   · - added robaxin 500 mg q 6  · - added Toradol 30 mg q 6 hrs per acute pain for 5 days monitor renal function   · - increased oxy to 10 mg and 15 mg   · - added iv morphine 4 mg q2 hrs prn for breakthrough  · -continue tylenol atc pt can refuse (she states it makes her break out in profuse sweats)   · - continue lidocaine patch  · Patient with history of intravenous drug abuse

## 2020-08-15 NOTE — PROGRESS NOTES
Progress Note - Acute Pain Service    Leigh Ann Cohen 34 y o  female MRN: [id number]  Unit/Bed#: [facility] 425-01 Encounter: [id number]      Assessment:   Principal Problem:    Bacteremia due to Staphylococcus aureus  Active Problems:    Acute bacterial endocarditis    Septic embolism ([facility] )    Bipolar 1 disorder ([facility] )    Back pain    Intravenous drug abuse ([facility] )    Patient does better with the ketamine infusion, but her IVs have infiltrated, so it is not running at this time  Once the IV is restarted we will continue the ketamine at the same dose  Plan:   - Place New IV, restart ketamine   - Continue other pain meds as presently ordered    APS will continue to follow; please contact APS ( [phone]) with any further questions    Pain History  Current pain location(s):   Pain Scale:   10/10  Quality: burning, sharp, severe  24 hour history: better on ketamine    Opioid requirement previous 24 hours: 20 mg morphine, 60 mg oxycodone        Meds/Allergies   all current active meds have been reviewed and current meds:   Current Facility-Administered Medications   Medication Dose Route Frequency    acetaminophen (TYLENOL) tablet 975 mg  975 mg Oral Q8H [facility]    calcium carbonate (TUMS) chewable tablet 1,000 mg  1,000 mg Oral Daily PRN    ceFAZolin (ANCEF) IVPB (premix) 2,000 mg 50 mL  2,000 mg Intravenous Q8H    docusate sodium (COLACE) capsule 100 mg  100 mg Oral BID    enoxaparin (LOVENOX) subcutaneous injection 40 mg  40 mg Subcutaneous Q24H [facility]    gabapentin (NEURONTIN) capsule 100 mg  100 mg Oral TID    glycopyrrolate (ROBINUL) injection 0 2 mg  0 2 mg Intravenous Q4H PRN    haloperidol lactate (HALDOL) injection 2 mg  2 mg Intramuscular Q30 Min PRN    ketamine 250 mg (STANDARD CONCENTRATION) IV in sodium chloride 0 9% 250 mL  0 2 mg/kg/hr Intravenous Continuous    ketorolac (TORADOL) injection 30 mg  30 mg Intravenous Q6H [facility]    lidocaine (LIDODERM) 5 % patch 2 patch  2 patch Topical Daily  LORazepam (ATIVAN) injection 1 mg  1 mg Intravenous Q1H PRN    methocarbamol (ROBAXIN) tablet 500 mg  500 mg Oral Q6H [facility]    mirtazapine (REMERON) tablet 30 mg  30 mg Oral HS    morphine (PF) 4 mg/mL injection 4 mg  4 mg Intravenous Q2H PRN    ondansetron (ZOFRAN) injection 4 mg  4 mg Intravenous Q6H PRN    oxyCODONE (ROXICODONE) immediate release tablet 10 mg  10 mg Oral Q4H PRN    oxyCODONE (ROXICODONE) IR tablet 15 mg  15 mg Oral Q4H PRN    polyethylene glycol (MIRALAX) packet 17 g  17 g Oral Daily PRN       Allergies   Allergen Reactions    Cat Hair Extract     Dog Epithelium     Latex     Pollen Extract        Objective     Temp:  [97 5 °F (36 4 °C)-99 8 °F (37 7 °C)] 98 2 °F (36 8 °C)  HR:  [] 99  Resp:  [15-18] 18  BP: (116-134)/(75-82) 128/79    Physical Exam  Vitals signs reviewed  Exam conducted with a chaperone present  Constitutional:       Appearance: Normal appearance  She is normal weight  HENT:      Head: Normocephalic  Eyes:      Pupils: Pupils are equal, round, and reactive to light  Cardiovascular:      Rate and Rhythm: Normal rate and regular rhythm  Pulses: Normal pulses  Heart sounds: Normal heart sounds  Pulmonary:      Effort: Pulmonary effort is normal    Abdominal:      General: Abdomen is flat  Skin:     General: Skin is warm and dry  Neurological:      General: No focal deficit present  Mental Status: She is alert and oriented to person, place, and time     Psychiatric:         Mood and Affect: Mood normal          Behavior: Behavior normal          Lab Results:   Results from last 7 days   Lab Units 08/14/20  0943   WBC Thousand/uL 10 64*   HEMOGLOBIN g/dL 7 4*   HEMATOCRIT % 22 7*   PLATELETS Thousands/uL 360      Results from last 7 days   Lab Units 08/14/20  0943   POTASSIUM mmol/L 4 2   CHLORIDE mmol/L 106   CO2 mmol/L 26   BUN mg/dL 10   CREATININE mg/dL 0 55*   CALCIUM mg/dL 7 8*   ALK PHOS U/L 141*   ALT U/L 18   AST U/L 45 Imaging Studies: I have personally reviewed pertinent reports  EKG, Pathology, and Other Studies: I have personally reviewed pertinent reports  Counseling / Coordination of Care  Total floor / unit time spent today 30 minutes  Greater than 50% of total time was spent with the patient and / or family counseling and / or coordination of care       Stone Burns MD

## 2020-08-15 NOTE — ASSESSMENT & PLAN NOTE
· 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  Initially patient was admitted to [facility] from 07/15-blood culture were positive for MSSA bacteremia, but patient signed AMA on 07/22  Her repeat blood culture done on 7/21 was negative after 5 days  · Patient was admitted to [facility] on 07/30-blood culture came back positive for MSSA and she left again is medical advice on 08/04  Her blood cultures came back positive on 07/30, but the bacteremia cleared as of 8 /2  Patient left AMA on 08/04  · Patient was readmitted to [address] [place] [address] on 08/10-blood culture came back positive for Staph aureus  Currently on cefazolin per Infectious Disease  · 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 during the fast hospital stay from 7/15-7/22  · Continue with the antibiotics per Infectious Disease  · Repeat blood culture (pt has been refusing sticks extensive discussion had with the pt and the nursing staff to have the best person obtaining her blood)   · tmax 101  · CT surgery appreciated  · Patient also with previous history of MSSA bacteremia /possible endocarditis in 2018-    8/14-patient had 1 blood culture drawn yesterday; tentatively positive for staph, awaiting speciation is  Continue current treatment plan    - remains afebrile although tachycardic; will discuss with patient alone further cultures to be drawn tomorrow    8/15 - No vascular access prevented repeat draws this AM; will attempt again once vascular access is obtained

## 2020-08-15 NOTE — NURSING NOTE
Pt throughout the night screaming and calling out in pain, even for iv flushes, multiple attempts to put in a new iv but pt refuses  Finally able to establish one with the site right, after 2 iv med's given, she started complaining that it hurt but refusing for anyone to try another iv

## 2020-08-15 NOTE — ASSESSMENT & PLAN NOTE
· Patient with acute drug abuse and likely the source of bacteremia  · Patient has a history of signing against medical advise  · Pain control  · May benefit from drug rehab eventually  · During the hospital stay in July of this year patient was positive for [facility] ,cocaine and opiates  · Still pending urine drug screen   · Pt is refusing to urinate per nursing at this time I feel she will void when needed  She is demanding to have a purewick attached as she states she refuses to [employer]"  At this time we will work toward pain control

## 2020-08-15 NOTE — ASSESSMENT & PLAN NOTE
· Patient noted to have tricuspid while vegetation on echocardiogram done in July  Patient had a transthoracic and also transesophageal echocardiogram done during the last hospital stay  · Recent CT of the abdomen and pelvis and also CT chest shows abnormalities concerning for septic emboli  · Antibiotics per Infectious Disease  · Patient is transferred over here to be evaluated by CT surgery  · - at the time of their evaluation pt is bilgerant and uncooperative reportedly due to pain  · - she was noncommittal to abstain from illegal drugs or unintended use of medications  · - recommendations to continue iv abx use   · - when she continues to abstain and completes iv abx treatment they would consider surgical correction   · - would benefit from neuropsych and psychiatry input  · Upon my entry to room boundaries were set as pt was rude and saying the staff were "trash" I told her that is not acceptable and that there needs to be trust and respect   She is very sick and needs to complete treatment, or she could become septic and ultimately die  The pts mother is at bedside (works at [employer]) is trying to make pt be more cooperative  · Monitor respiratory status    8/15-repeat labs; awaiting repeat blood culture  Continue antibiotics as per ID

## 2020-08-15 NOTE — PROGRESS NOTES
Progress Note - Yari Olsen 1990, 34 y o  female MRN: [id number]    Unit/Bed#: [facility] 425-01 Encounter: [id number]    Primary Care Provider: Elian Mohan PA-C   Date and time admitted to hospital: 8/12/2020  7:39 PM        Intravenous drug abuse [facility])  Assessment & Plan  · Patient with acute drug abuse and likely the source of bacteremia  · Patient has a history of signing against medical advise  · Pain control  · May benefit from drug rehab eventually  · During the hospital stay in July of this year patient was positive for [facility] ,cocaine and opiates  · Still pending urine drug screen   · Pt is refusing to urinate per nursing at this time I feel she will void when needed  She is demanding to have a purewick attached as she states she refuses to [employer]"  At this time we will work toward pain control  Back pain  Assessment & Plan  · Patient is complaining of severe back pain mainly in the lower part of the back and also in the sacral region  · Refuses examination due to pain  · Patient had MRI of the lumbar and thoracic spine during the recent hospital stay in [facility] which was unremarkable  But patient reports that her pain is much worse and she is screaming out saying that she is in severe pain and oxycodone is not helping her at all  Was changed to p o  Dilaudid over night     · Myself and acute pain at bedside discussed plan and pt seems to be calming down , adjustments made per acute pain recs   · - added robaxin 500 mg q 6  · - added Toradol 30 mg q 6 hrs per acute pain for 5 days monitor renal function   · - increased oxy to 10 mg and 15 mg   · - added iv morphine 4 mg q2 hrs prn for breakthrough  · -continue tylenol atc pt can refuse (she states it makes her break out in profuse sweats)   · - continue lidocaine patch  · Patient with history of intravenous drug abuse    Bipolar 1 disorder [facility])  Assessment & Plan  · Patient refused Psychiatry and neuropsych evaluation, once pain more controlled will discuss again and have their input   · Supportive care    Septic embolism [facility])  Assessment & Plan  · Patient noted to have abnormalities seen in the CT of the chest abdomen and pelvis concerning for septic emboli  · There is pleural effusion on the CT scan of the chest and also on repeat chest x-ray  · Patient is rather asymptomatic from pulmonary standpoint  · Continue with the antibiotics  (reconsulted ID)   · Thoracic surgery have evaluated the pt and she is not short of breath     8/14-patient complaining of acute right hip pain; concerning for possible embolism  Consider imaging and will discuss with acute pain service  Acute bacterial endocarditis  Assessment & Plan  · Patient noted to have tricuspid while vegetation on echocardiogram done in July  Patient had a transthoracic and also transesophageal echocardiogram done during the last hospital stay  · Recent CT of the abdomen and pelvis and also CT chest shows abnormalities concerning for septic emboli  · Antibiotics per Infectious Disease  · Patient is transferred over here to be evaluated by CT surgery  · - at the time of their evaluation pt is bilgerant and uncooperative reportedly due to pain  · - she was noncommittal to abstain from illegal drugs or unintended use of medications  · - recommendations to continue iv abx use   · - when she continues to abstain and completes iv abx treatment they would consider surgical correction   · - would benefit from neuropsych and psychiatry input  · Upon my entry to room boundaries were set as pt was rude and saying the staff were "trash" I told her that is not acceptable and that there needs to be trust and respect   She is very sick and needs to complete treatment, or she could become septic and ultimately die  The pts mother is at bedside (works at [employer]) is trying to make pt be more cooperative     · Monitor respiratory status    8/15-repeat labs; awaiting repeat blood culture  Continue antibiotics as per ID  * Bacteremia due to Staphylococcus aureus  Assessment & Plan  · 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  Initially patient was admitted to [address] [place] [address] from 07/15-blood culture were positive for MSSA bacteremia, but patient signed AMA on 07/22  Her repeat blood culture done on 7/21 was negative after 5 days  · Patient was admitted to [facility] on 07/30-blood culture came back positive for MSSA and she left again is medical advice on 08/04  Her blood cultures came back positive on 07/30, but the bacteremia cleared as of 8 /2  Patient left AMA on 08/04  · Patient was readmitted to [address] [place] [address] on 08/10-blood culture came back positive for Staph aureus  Currently on cefazolin per Infectious Disease  · 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 during the fast hospital stay from 7/15-7/22  · Continue with the antibiotics per Infectious Disease  · Repeat blood culture (pt has been refusing sticks extensive discussion had with the pt and the nursing staff to have the best person obtaining her blood)   · tmax 101  · CT surgery appreciated  · Patient also with previous history of MSSA bacteremia /possible endocarditis in 2018-    8/14-patient had 1 blood culture drawn yesterday; tentatively positive for staph, awaiting speciation is  Continue current treatment plan  - remains afebrile although tachycardic; will discuss with patient alone further cultures to be drawn tomorrow    8/15 - No vascular access prevented repeat draws this AM; will attempt again once vascular access is obtained      VTE Pharmacologic Prophylaxis:   Pharmacologic: Enoxaparin (Lovenox)  Mechanical VTE Prophylaxis in Place: Yes    Patient Centered Rounds: I have performed bedside rounds with nursing staff today      Discussions with Specialists or Other Care Team Provider: ID    Education and Discussions with Family / Patient: Discussed treatment plan with family and patient who agree with current plan; encouraged to ask questions and participate  Time Spent for Care: 30 minutes  More than 50% of total time spent on counseling and coordination of care as described above  Current Length of Stay: 3 day(s)    Current Patient Status: Inpatient   Certification Statement: The patient will continue to require additional inpatient hospital stay due to Treatment of back treatment endocarditis    Discharge Plan: TBD    Code Status: Level 1 - Full Code      Subjective:   Patient seen examined bedside, still complaining of exquisite right hip pain  Discussed receiving PICC line with patient and obtained consent  Understand that her blood cultures have not been cleared for 72 hours; however, ultrasound, Site right, etc   And several nurses have attempted to obtain venous access, cannot establish  Discussed possible EJ, patient said that her jugular veins were extensively scarred from extensive IV drug use  Attempting to get CT of right hip once venous access established, continue ketamine drip as per acute pain service  Objective:     Vitals:   Temp (24hrs), Av 8 °F (37 1 °C), Min:97 5 °F (36 4 °C), Max:99 8 °F (37 7 °C)    Temp:  [97 5 °F (36 4 °C)-99 8 °F (37 7 °C)] 98 2 °F (36 8 °C)  HR:  [] 99  Resp:  [16-18] 18  BP: (116-134)/(75-82) 128/79  SpO2:  [94 %-97 %] 94 %  Body mass index is 23 3 kg/m²  Input and Output Summary (last 24 hours): Intake/Output Summary (Last 24 hours) at 8/15/2020 1115  Last data filed at 8/15/2020 1009  Gross per 24 hour   Intake 1646 51 ml   Output 2900 ml   Net -1253 49 ml       Physical Exam:     Physical Exam  Vitals signs and nursing note reviewed  Constitutional:       Comments: Distressed appearance   HENT:      Head: Normocephalic and atraumatic        Right Ear: External ear normal       Left Ear: External ear normal       Nose: Nose normal       Mouth/Throat:      Mouth: Mucous membranes are moist    Eyes:      Extraocular Movements: Extraocular movements intact  Conjunctiva/sclera: Conjunctivae normal       Pupils: Pupils are equal, round, and reactive to light  Neck:      Musculoskeletal: Normal range of motion and neck supple  Cardiovascular:      Rate and Rhythm: Tachycardia present  Musculoskeletal:         General: Tenderness present  Comments: Pain and right hip with reduced range of motion   Skin:     General: Skin is warm and dry  Neurological:      Mental Status: She is alert and oriented to person, place, and time  Psychiatric:      Comments: Anxious; dysphoric affect           Additional Data:     Labs:    Results from last 7 days   Lab Units 08/14/20  0943 08/13/20  0447   WBC Thousand/uL 10 64* 9 97   HEMOGLOBIN g/dL 7 4* 7 5*   HEMATOCRIT % 22 7* 23 5*   PLATELETS Thousands/uL 360 372   NEUTROS PCT %  --  60   LYMPHS PCT %  --  29   LYMPHO PCT % 26  --    MONOS PCT %  --  8   MONO PCT % 3*  --    EOS PCT % 3 2     Results from last 7 days   Lab Units 08/14/20  0943   SODIUM mmol/L 138   POTASSIUM mmol/L 4 2   CHLORIDE mmol/L 106   CO2 mmol/L 26   BUN mg/dL 10   CREATININE mg/dL 0 55*   ANION GAP mmol/L 6   CALCIUM mg/dL 7 8*   ALBUMIN g/dL 1 6*   TOTAL BILIRUBIN mg/dL 0 25   ALK PHOS U/L 141*   ALT U/L 18   AST U/L 45   GLUCOSE RANDOM mg/dL 104     Results from last 7 days   Lab Units 08/10/20  0638   INR  1 20*                       * I Have Reviewed All Lab Data Listed Above  * Additional Pertinent Lab Tests Reviewed:  All Labs Within Last 24 Hours Reviewed    Imaging:    Imaging Reports Reviewed Today Include:   Imaging Personally Reviewed by Myself Includes:      Recent Cultures (last 7 days):     Results from last 7 days   Lab Units 08/13/20  0446 08/10/20  0638 08/10/20  0546   BLOOD CULTURE  Staphylococcus aureus* Staphylococcus aureus* Staphylococcus aureus*  Staphylococcus coagulase negative*   GRAM STAIN RESULT  Gram positive cocci in clusters* Gram positive cocci in clusters* Gram positive cocci in clusters*       Last 24 Hours Medication List:   Current Facility-Administered Medications   Medication Dose Route Frequency Provider Last Rate    acetaminophen  975 mg Oral [facility] Dari Gonzales MD      calcium carbonate  1,000 mg Oral Daily PRN Dari Gonzales MD      cefazolin  2,000 mg Intravenous Q8H Asha Roca PA-C Stopped (08/15/20 0730)    docusate sodium  100 mg Oral BID John Michael MD      enoxaparin  40 mg Subcutaneous Q24H [facility] John Michael MD      gabapentin  100 mg Oral TID Dari Gonzales MD      glycopyrrolate  0 2 mg Intravenous Q4H PRN Bonnie Dancer, PA-C      haloperidol lactate  2 mg Intramuscular Q30 Min PRN Bonnie Dancer, PA-C      ketamine  0 2 mg/kg/hr Intravenous Continuous Bonnie Dancer, PA-C Stopped (08/15/20 0915)    ketorolac  30 mg Intravenous Q6H [facility] BOO Sharpe      lidocaine  2 patch Topical Daily Asha Roca PA-C      LORazepam  1 mg Intravenous Q1H PRN Bonnie Dancer, PA-C      methocarbamol  500 mg Oral Q6H [facility] BOO Sharpe      mirtazapine  30 mg Oral HS Dari Gonzales MD      morphine injection  4 mg Intravenous Q2H PRN BOO Sharpe      ondansetron  4 mg Intravenous Q6H PRN Dari Gonzales MD      oxyCODONE  10 mg Oral Q4H PRN BOO Sharpe      oxyCODONE  15 mg Oral Q4H PRN BOO Sharpe      polyethylene glycol  17 g Oral Daily PRN John Michael MD          Today, Patient Was Seen By: Natalio Briggs MD    ** Please Note: Dictation voice to text software may have been used in the creation of this document   **

## 2020-08-15 NOTE — PROGRESS NOTES
PICC consult received for no peripheral access  Pt has positive blood cultures  Reached out to Dr Kishor Marino via [employer] who stated OK for PICC if no other options  Spoke to Dr Jamil Brock as well who stated PICC was path of least resistance for now and OK'd to place

## 2020-08-15 NOTE — PROGRESS NOTES
Progress Note - Infectious Disease   Kaylee Serrano 34 y o  female MRN: [id number]  Unit/Bed#: [facility] 425-01 Encounter: [id number]      Impression/Plan:  1  Recurrent/persistent MSSA bacteremia with TV infective endocarditis   Blood cultures from 8/10 remain positive   Prolonged course of IV antibiotic was previously recommended, but patient has left AMA on 2 prior occasions (once at [facility] and once from [facility])  She remains hemodynamically stable despite her ongoing bacteremia      -continue cefazolin for now at current dose  -repeat blood cultures x2 sets today  -if bacteremia persists, will change to nafcillin  -recheck CBC with diff and BMP     2  Tricuspid valve endocarditis, 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  7/21 MELISSA showed large vegetation on TV with severe regurgitation  7/15 CT abdomen/pelvis also showed bilateral renal parenchymal abnormalities concerning for septic emboli   No intra-abdominal abscess     -antibiotic plan as above  -CT surgery is following      3  Recent left foot cellulitis with abscess   Likely site of injection of IV drugs versus ectopic foci in the setting of MSSA bacteremia   This appears to have healed with no evidence of active infection   -antibiotic plan as above      4  Back pain   More likely secondary to chronic pain, opioid dependence   8/1/20 Thoracic and lumbar spine MRIs performed at [facility] [place] negative were for abscess   -monitor back pain   -serial exams   -Pain management is following      5  Active IV heroin abuse   This is the causative factor for development of bacteremia and infective endocarditis   Patient has left AMA on prior occasions  HIV screen negative  -patient is not a candidate for at home PICC line/IV antibiotics  -host evaluation if patient will agree     6   Chronic HCV infection, transaminitis   Recent HIV was negative   -monitor LFTs     Discussed the above management plan with the primary service    Antibiotics:  Cefazolin restart 6    Subjective:  Patient has no fever, chills, sweats; no nausea, vomiting, diarrhea; no cough, shortness of breath; still having pain  No new symptoms  She is very upset with her situation  Objective:  Vitals:  Temp:  [97 5 °F (36 4 °C)-99 8 °F (37 7 °C)] 98 2 °F (36 8 °C)  HR:  [] 99  Resp:  [15-18] 18  BP: (116-134)/(72-82) 128/79  SpO2:  [94 %-97 %] 94 %  Temp (24hrs), Av 7 °F (37 1 °C), Min:97 5 °F (36 4 °C), Max:99 8 °F (37 7 °C)  Current: Temperature: 98 2 °F (36 8 °C)    Physical Exam:   General Appearance:  Alert, interactive, nontoxic, no acute distress  Throat: Oropharynx moist without lesions  Lungs:   Clear to auscultation bilaterally; no wheezes, rhonchi or rales; respirations unlabored   Heart:  Tachycardic; no murmur, rub or gallop   Abdomen:   Soft, non-tender, non-distended, positive bowel sounds  Extremities: No clubbing, cyanosis or edema   Skin: No new rashes or lesions  No draining wounds noted         Labs, Imaging, & Other studies:   All pertinent labs and imaging studies were personally reviewed  Results from last 7 days   Lab Units 20  0943 08/13/20  0447 08/10/20  0639   WBC Thousand/uL 10 64* 9 97 5 82   HEMOGLOBIN g/dL 7 4* 7 5* 9 2*   PLATELETS Thousands/uL 360 372 322     Results from last 7 days   Lab Units 20  0943 20  0447 08/10/20  0638   SODIUM mmol/L 138 139 134   POTASSIUM mmol/L 4 2 3 1* 2 9*   CHLORIDE mmol/L 106 103 96   CO2 mmol/L 26 28 26   BUN mg/dL 10 10 15   CREATININE mg/dL 0 55* 0 40* 0 61   EGFR ml/min/1 73sq m 129 143 125   CALCIUM mg/dL 7 8* 7 9* 8 6   AST U/L 45  --  134*   ALT U/L 18  --  59*   ALK PHOS U/L 141*  --  130 0     Results from last 7 days   Lab Units 20  0446 08/10/20  0638 08/10/20  0546   BLOOD CULTURE  Staphylococcus aureus* Staphylococcus aureus* Staphylococcus aureus*  Staphylococcus coagulase negative*   GRAM STAIN RESULT  Gram positive cocci in clusters* Gram positive cocci in clusters* Gram positive cocci in clusters*         Results from last 7 days   Lab Units 08/10/20  0638   CRP mg/L 32 9*

## 2020-08-15 NOTE — PROCEDURES
Insert PICC line    Date/Time: 8/15/2020 2:52 PM  Performed by: Ed Bowden RN  Authorized by: Jovani Stephens MD     Patient location:  Bedside  Other Assisting Provider: Yes (comment) [facility])    Consent:     Consent obtained:  Written (Physician obtained)    Consent given by:  Patient    Procedural risks discussed: with MD   [place] protocol:     Procedure explained and questions answered to patient or proxy's satisfaction: yes      Relevant documents present and verified: yes      Test results available and properly labeled: yes      Radiology Images displayed and confirmed  If images not available, report reviewed: yes      Required blood products, implants, devices, and special equipment available: yes      Site/side marked: yes      Immediately prior to procedure, a time out was called: yes      Patient identity confirmed:  Verbally with patient  Pre-procedure details:     Hand hygiene: Hand hygiene performed prior to insertion      Sterile barrier technique: All elements of maximal sterile technique followed      Skin preparation:  ChloraPrep    Skin preparation agent: Skin preparation agent completely dried prior to procedure    Indications:     PICC line indications: no peripheral vascular access    Anesthesia (see MAR for exact dosages):      Anesthesia method:  Local infiltration    Local anesthetic:  Lidocaine 2% w/o epi (2 ml)  Procedure details:     Location:  Basilic    Vessel type: vein      Laterality:  Left    Site selection rationale:  Pt preferred left arm    Approach: percutaneous technique used      Patient position:  Flat    Procedural supplies:  Double lumen    Catheter size:  5 Fr    Landmarks identified: yes      Ultrasound guidance: yes      Sterile ultrasound techniques: Sterile gel and sterile probe covers were used      Number of attempts:  1    Successful placement: yes      Vessel of catheter tip end:  Sherlock 3CG confirmed    Total catheter length (cm): 42    Catheter out on skin (cm):  1    Max flow rate:  999    Arm circumference:  28  Post-procedure details:     Post-procedure:  Dressing applied and securement device placed    Assessment:  Blood return through all ports and free fluid flow    Post-procedure complications: none      Patient tolerance of procedure:   Tolerated well, no immediate complications

## 2020-08-15 NOTE — PLAN OF CARE
Problem: Potential for Falls  Goal: Patient will remain free of falls  Description: INTERVENTIONS:  - Assess patient frequently for physical needs  -  Identify cognitive and physical deficits and behaviors that affect risk of falls    -  [place] fall precautions as indicated by assessment   - Educate patient/family on patient safety including physical limitations  - Instruct patient to call for assistance with activity based on assessment  - Modify environment to reduce risk of injury  - Consider OT/PT consult to assist with strengthening/mobility  Outcome: Progressing

## 2020-08-16 LAB
ALBUMIN SERPL BCP-MCNC: 1.8 G/DL (ref 3.5–5)
ALP SERPL-CCNC: 139 U/L (ref 46–116)
ALT SERPL W P-5'-P-CCNC: 60 U/L (ref 12–78)
ANION GAP SERPL CALCULATED.3IONS-SCNC: 8 MMOL/L (ref 4–13)
AST SERPL W P-5'-P-CCNC: 198 U/L (ref 5–45)
BACTERIA BLD CULT: ABNORMAL
BILIRUB SERPL-MCNC: 0.37 MG/DL (ref 0.2–1)
BUN SERPL-MCNC: 11 MG/DL (ref 5–25)
CALCIUM SERPL-MCNC: 8.5 MG/DL (ref 8.3–10.1)
CHLORIDE SERPL-SCNC: 103 MMOL/L (ref 100–108)
CO2 SERPL-SCNC: 24 MMOL/L (ref 21–32)
CREAT SERPL-MCNC: 0.5 MG/DL (ref 0.6–1.3)
ERYTHROCYTE [DISTWIDTH] IN BLOOD BY AUTOMATED COUNT: 14.9 % (ref 11.6–15.1)
GFR SERPL CREATININE-BSD FRML MDRD: 133 ML/MIN/1.73SQ M
GLUCOSE SERPL-MCNC: 108 MG/DL (ref 65–140)
GRAM STN SPEC: ABNORMAL
HCT VFR BLD AUTO: 24.4 % (ref 34.8–46.1)
HGB BLD-MCNC: 7.8 G/DL (ref 11.5–15.4)
MAGNESIUM SERPL-MCNC: 2 MG/DL (ref 1.6–2.6)
MCH RBC QN AUTO: 26.9 PG (ref 26.8–34.3)
MCHC RBC AUTO-ENTMCNC: 32 G/DL (ref 31.4–37.4)
MCV RBC AUTO: 84 FL (ref 82–98)
NRBC BLD AUTO-RTO: 0 /100 WBCS
PHOSPHATE SERPL-MCNC: 3.4 MG/DL (ref 2.7–4.5)
PLATELET # BLD AUTO: 393 THOUSANDS/UL (ref 149–390)
PMV BLD AUTO: 8.8 FL (ref 8.9–12.7)
POTASSIUM SERPL-SCNC: 4.5 MMOL/L (ref 3.5–5.3)
PROT SERPL-MCNC: 7.3 G/DL (ref 6.4–8.2)
RBC # BLD AUTO: 2.9 MILLION/UL (ref 3.81–5.12)
SODIUM SERPL-SCNC: 135 MMOL/L (ref 136–145)
WBC # BLD AUTO: 11.66 THOUSAND/UL (ref 4.31–10.16)

## 2020-08-16 PROCEDURE — 80053 COMPREHEN METABOLIC PANEL: CPT | Performed by: INTERNAL MEDICINE

## 2020-08-16 PROCEDURE — 99232 SBSQ HOSP IP/OBS MODERATE 35: CPT | Performed by: NURSE PRACTITIONER

## 2020-08-16 PROCEDURE — 99252 IP/OBS CONSLTJ NEW/EST SF 35: CPT | Performed by: STUDENT IN AN ORGANIZED HEALTH CARE EDUCATION/TRAINING PROGRAM

## 2020-08-16 PROCEDURE — 83735 ASSAY OF MAGNESIUM: CPT | Performed by: INTERNAL MEDICINE

## 2020-08-16 PROCEDURE — 99232 SBSQ HOSP IP/OBS MODERATE 35: CPT | Performed by: INTERNAL MEDICINE

## 2020-08-16 PROCEDURE — 85025 COMPLETE CBC W/AUTO DIFF WBC: CPT | Performed by: INTERNAL MEDICINE

## 2020-08-16 PROCEDURE — 84100 ASSAY OF PHOSPHORUS: CPT | Performed by: INTERNAL MEDICINE

## 2020-08-16 RX ADMIN — ENOXAPARIN SODIUM 40 MG: 40 INJECTION SUBCUTANEOUS at 09:16

## 2020-08-16 RX ADMIN — METHOCARBAMOL 500 MG: 500 TABLET, FILM COATED ORAL at 17:18

## 2020-08-16 RX ADMIN — KETOROLAC TROMETHAMINE 30 MG: 30 INJECTION, SOLUTION INTRAMUSCULAR at 17:17

## 2020-08-16 RX ADMIN — GABAPENTIN 100 MG: 100 CAPSULE ORAL at 09:16

## 2020-08-16 RX ADMIN — LORAZEPAM 1 MG: 2 INJECTION INTRAMUSCULAR; INTRAVENOUS at 01:34

## 2020-08-16 RX ADMIN — METHOCARBAMOL 500 MG: 500 TABLET, FILM COATED ORAL at 11:36

## 2020-08-16 RX ADMIN — KETOROLAC TROMETHAMINE 30 MG: 30 INJECTION, SOLUTION INTRAMUSCULAR at 06:00

## 2020-08-16 RX ADMIN — METHOCARBAMOL 500 MG: 500 TABLET, FILM COATED ORAL at 23:01

## 2020-08-16 RX ADMIN — Medication 0.2 MG/KG/HR: at 06:28

## 2020-08-16 RX ADMIN — GABAPENTIN 100 MG: 100 CAPSULE ORAL at 21:12

## 2020-08-16 RX ADMIN — MORPHINE SULFATE 4 MG: 4 INJECTION INTRAVENOUS at 17:18

## 2020-08-16 RX ADMIN — OXYCODONE HYDROCHLORIDE 15 MG: 10 TABLET ORAL at 05:58

## 2020-08-16 RX ADMIN — CEFAZOLIN SODIUM 2000 MG: 2 SOLUTION INTRAVENOUS at 15:50

## 2020-08-16 RX ADMIN — OXYCODONE HYDROCHLORIDE 15 MG: 10 TABLET ORAL at 11:36

## 2020-08-16 RX ADMIN — Medication 0.2 MG/KG/HR: at 17:20

## 2020-08-16 RX ADMIN — MIRTAZAPINE 30 MG: 30 TABLET, FILM COATED ORAL at 21:12

## 2020-08-16 RX ADMIN — SENNOSIDES 8.6 MG: 8.6 TABLET ORAL at 09:16

## 2020-08-16 RX ADMIN — KETOROLAC TROMETHAMINE 30 MG: 30 INJECTION, SOLUTION INTRAMUSCULAR at 11:36

## 2020-08-16 RX ADMIN — KETOROLAC TROMETHAMINE 30 MG: 30 INJECTION, SOLUTION INTRAMUSCULAR at 23:01

## 2020-08-16 RX ADMIN — CEFAZOLIN SODIUM 2000 MG: 2 SOLUTION INTRAVENOUS at 07:18

## 2020-08-16 RX ADMIN — GABAPENTIN 100 MG: 100 CAPSULE ORAL at 15:50

## 2020-08-16 RX ADMIN — CEFAZOLIN SODIUM 2000 MG: 2 SOLUTION INTRAVENOUS at 22:45

## 2020-08-16 RX ADMIN — SENNOSIDES 8.6 MG: 8.6 TABLET ORAL at 17:18

## 2020-08-16 RX ADMIN — ACETAMINOPHEN 975 MG: 325 TABLET, FILM COATED ORAL at 05:59

## 2020-08-16 RX ADMIN — OXYCODONE HYDROCHLORIDE 15 MG: 10 TABLET ORAL at 20:03

## 2020-08-16 RX ADMIN — METHOCARBAMOL 500 MG: 500 TABLET, FILM COATED ORAL at 05:59

## 2020-08-16 RX ADMIN — MORPHINE SULFATE 4 MG: 4 INJECTION INTRAVENOUS at 12:43

## 2020-08-16 RX ADMIN — MORPHINE SULFATE 4 MG: 4 INJECTION INTRAVENOUS at 07:15

## 2020-08-16 RX ADMIN — MORPHINE SULFATE 4 MG: 4 INJECTION INTRAVENOUS at 21:12

## 2020-08-16 RX ADMIN — OXYCODONE HYDROCHLORIDE 15 MG: 10 TABLET ORAL at 15:50

## 2020-08-16 RX ADMIN — LORAZEPAM 1 MG: 2 INJECTION INTRAMUSCULAR; INTRAVENOUS at 22:45

## 2020-08-16 RX ADMIN — MORPHINE SULFATE 4 MG: 4 INJECTION INTRAVENOUS at 04:59

## 2020-08-16 NOTE — ASSESSMENT & PLAN NOTE
· Patient with acute drug abuse and likely the source of bacteremia  · Patient has a history of signing against medical advise  · Pain control  · May benefit from drug rehab eventually  · During the hospital stay in July of this year patient was positive for [facility] ,cocaine and opiates  · Still pending urine drug screen   · Pt is refusing to urinate per nursing at this time I feel she will void when needed  She is demanding to have a purewick attached as she states she refuses to [employer]"  At this time we will work toward pain control

## 2020-08-16 NOTE — ASSESSMENT & PLAN NOTE
· Patient noted to have abnormalities seen in the CT of the chest abdomen and pelvis concerning for septic emboli  · There is pleural effusion on the CT scan of the chest and also on repeat chest x-ray  · Patient is rather asymptomatic from pulmonary standpoint  · Continue with the antibiotics  (reconsulted ID)   · Thoracic surgery have evaluated the pt and she is not short of breath     8/14-patient complaining of acute right hip pain; concerning for possible embolism  Consider imaging and will discuss with acute pain service  8/15-imaging right hip shows no osseous involvement; concern for proximity of abscess status SI joint, but no SI joint involvement at this time  Continue antibiotics monitor for improvement

## 2020-08-16 NOTE — PROGRESS NOTES
Pt is requesting to have shower privileges  Patient's telemetry is also soon to   Dr Aki Dior was made aware  Was informed that orders are to follow for shower and was advised to remove the tele

## 2020-08-16 NOTE — PROGRESS NOTES
Progress Note - Acute Pain Service    Maulik Alvarado 34 y o  female MRN: [id number]  Unit/Bed#: [facility] 425-01 Encounter: [id number]      Assessment:   Principal Problem:    Bacteremia due to Staphylococcus aureus  Active Problems:    Acute bacterial endocarditis    Septic embolism ([facility] [address] )    Bipolar 1 disorder (HCC)    Back pain    Intravenous drug abuse ([facility] )    Maulik Alvarado is a 34 y o  female with recurrent MSSA bacteremia and TV endocarditis continues with back and right hip pain  CT of right lower extremity with 2 small abscesses in the right posterior iliacus muscle approximately at the level of the upper to mid SI joint  Yesterday PICC line was placed due to inability to obtain IV access and ketamine gtt was restarted  Plan:   · Tylenol 975 mg every 8 hours scheduled  · Gabapentin 100 mg 3 times a day  · Toradol 30 mg IV every 6 hours scheduled for 5 days  · Lidocaine patch daily, on for 12 hours and off for 12 hours  · Robaxin 500 mg every 6 hours scheduled  · Continue ketamine infusion at 0 2 mg/kg/hour  · Oxycodone 10 mg every 4 hours as needed for moderate pain  · Oxycodone 15 mg every 4 hours as needed for severe pain  · Morphine 4 mg IV every 2 hours as needed for breakthrough pain    Bowel management  · Senna 2 times daily  · MiraLax as needed    Treatment recommendations discussed with primary care service  Will continue the above analgesic plan for now including ketamine infusion, no changes today  APS will continue to follow  Please call  / [phone] or [facility] (/ between [phone] and on weekends) with questions or concerns    Pain History  Current pain location(s):right leg  Pain Scale:  8-10  24 hour history:  Patient is sleeping on arrival to Room, opens eyes easily to speech  Continues to report severe right hip and leg pain    PICC line was placed yesterday and patient has reported since ketamine was restarted that pain is slightly improved when she is tolerating ketamine infusion  Has been compliant with care included imaging studies and vital signs      Opioid requirement previous 24 hours: Oxycodone 45mg, IV morphine 20mg    Meds/Allergies   all current active meds have been reviewed and current meds:   Current Facility-Administered Medications   Medication Dose Route Frequency    acetaminophen (TYLENOL) tablet 975 mg  975 mg Oral Q8H [facility]    calcium carbonate (TUMS) chewable tablet 1,000 mg  1,000 mg Oral Daily PRN    ceFAZolin (ANCEF) IVPB (premix) 2,000 mg 50 mL  2,000 mg Intravenous Q8H    enoxaparin (LOVENOX) subcutaneous injection 40 mg  40 mg Subcutaneous Q24H [facility]    gabapentin (NEURONTIN) capsule 100 mg  100 mg Oral TID    glycopyrrolate (ROBINUL) injection 0 2 mg  0 2 mg Intravenous Q4H PRN    haloperidol lactate (HALDOL) injection 2 mg  2 mg Intramuscular Q30 Min PRN    ketamine 250 mg (STANDARD CONCENTRATION) IV in sodium chloride 0 9% 250 mL  0 2 mg/kg/hr Intravenous Continuous    ketorolac (TORADOL) injection 30 mg  30 mg Intravenous Q6H [facility]    lidocaine (LIDODERM) 5 % patch 2 patch  2 patch Topical Daily    LORazepam (ATIVAN) injection 1 mg  1 mg Intravenous Q1H PRN    methocarbamol (ROBAXIN) tablet 500 mg  500 mg Oral Q6H [facility]    mirtazapine (REMERON) tablet 30 mg  30 mg Oral HS    morphine (PF) 4 mg/mL injection 4 mg  4 mg Intravenous Q2H PRN    ondansetron (ZOFRAN) injection 4 mg  4 mg Intravenous Q6H PRN    oxyCODONE (ROXICODONE) immediate release tablet 10 mg  10 mg Oral Q4H PRN    oxyCODONE (ROXICODONE) IR tablet 15 mg  15 mg Oral Q4H PRN    polyethylene glycol (MIRALAX) packet 17 g  17 g Oral Daily PRN    senna (SENOKOT) tablet 8 6 mg  1 tablet Oral BID       Allergies   Allergen Reactions    Cat Hair Extract     Dog Epithelium     Latex     Pollen Extract        Objective     Temp:  [98 1 °F (36 7 °C)-100 8 °F (38 2 °C)] 98 4 °F (36 9 °C)  HR:  [] 105  Resp:  [18] 18  BP: (119-132)/(67-80) 122/72    Physical Exam  Vitals signs reviewed  Constitutional:       General: She is sleeping  She is not in acute distress  Appearance: Normal appearance  She is not ill-appearing, toxic-appearing or diaphoretic  HENT:      Head: Normocephalic and atraumatic  Nose: Nose normal    Neck:      Musculoskeletal: Normal range of motion  Cardiovascular:      Rate and Rhythm: Tachycardia present  Pulmonary:      Effort: Pulmonary effort is normal  No respiratory distress  Skin:     Coloration: Skin is pale  Neurological:      Mental Status: She is oriented to person, place, and time and easily aroused  Psychiatric:         Mood and Affect: Mood normal  Mood is not anxious  Speech: Speech normal          Behavior: Behavior normal  Behavior is not agitated  Behavior is cooperative  Lab Results:   Results from last 7 days   Lab Units 08/16/20  0556   WBC Thousand/uL 11 66*   HEMOGLOBIN g/dL 7 8*   HEMATOCRIT % 24 4*   PLATELETS Thousands/uL 393*      Results from last 7 days   Lab Units 08/16/20  0556   POTASSIUM mmol/L 4 5   CHLORIDE mmol/L 103   CO2 mmol/L 24   BUN mg/dL 11   CREATININE mg/dL 0 50*   CALCIUM mg/dL 8 5   ALK PHOS U/L 139*   ALT U/L 60   AST U/L 198*       Imaging Studies: I have personally reviewed pertinent reports  Counseling / Coordination of Care  Total floor / unit time spent today 15 minutes  Greater than 50% of total time was spent with the patient and / or family counseling and / or coordination of care  A description of the counseling / coordination of care:  Reviewed plan of care and medications with patient, RN staff, and Primary Care Service  Please note that the APS provides consultative services regarding pain management only  With the exception of ketamine and epidural infusions and except when indicated, final decisions regarding starting or changing doses of analgesic medications are at the discretion of the consulting service    Off hours consultation and/or medication management is generally not available      BOO Connor  Acute Pain Service

## 2020-08-16 NOTE — ASSESSMENT & PLAN NOTE
· 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  Initially patient was admitted to [facility] from 07/15-blood culture were positive for MSSA bacteremia, but patient signed AMA on 07/22  Her repeat blood culture done on 7/21 was negative after 5 days  · Patient was admitted to [facility] on 07/30-blood culture came back positive for MSSA and she left again is medical advice on 08/04  Her blood cultures came back positive on 07/30, but the bacteremia cleared as of 8 /2  Patient left AMA on 08/04  · Patient was readmitted to [address] [place] [address] on 08/10-blood culture came back positive for Staph aureus  Currently on cefazolin per Infectious Disease  · 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 during the fast hospital stay from 7/15-7/22  · Continue with the antibiotics per Infectious Disease  · Repeat blood culture (pt has been refusing sticks extensive discussion had with the pt and the nursing staff to have the best person obtaining her blood)   · tmax 101  · CT surgery appreciated  · Patient also with previous history of MSSA bacteremia /possible endocarditis in 2018-    8/14-patient had 1 blood culture drawn yesterday; tentatively positive for staph, awaiting speciation is  Continue current treatment plan  - remains afebrile although tachycardic; will discuss with patient alone further cultures to be drawn tomorrow    8/15 - No vascular access prevented repeat draws this AM; will attempt again once vascular access is obtained    8/16-PICC line inserted yesterday; blood cultures x1 obtained and pending  As per ID, continue current antibiotics, some concern however given her fever overnight increasing white count and tachycardia; will defer to ID for possible adjustments in antibiotic therapy

## 2020-08-16 NOTE — ASSESSMENT & PLAN NOTE
· Patient is complaining of severe back pain mainly in the lower part of the back and also in the sacral region  · Refuses examination due to pain  · Patient had MRI of the lumbar and thoracic spine during the recent hospital stay in [facility] which was unremarkable  But patient reports that her pain is much worse and she is screaming out saying that she is in severe pain and oxycodone is not helping her at all  Was changed to p o  Dilaudid over night     · Myself and acute pain at bedside discussed plan and pt seems to be calming down , adjustments made per acute pain recs   · - added robaxin 500 mg q 6  · - added Toradol 30 mg q 6 hrs per acute pain for 5 days monitor renal function   · - increased oxy to 10 mg and 15 mg   · - added iv morphine 4 mg q2 hrs prn for breakthrough  · -continue tylenol atc pt can refuse (she states it makes her break out in profuse sweats)   · - continue lidocaine patch  · Patient with history of intravenous drug abuse

## 2020-08-16 NOTE — NURSING NOTE
Took over patient care at 0300  Patient status unchanged from report  Patient resting, will continue to monitor

## 2020-08-16 NOTE — PROGRESS NOTES
Progress Note - Infectious Disease   Betty Awan 34 y o  female MRN: [id number]  Unit/Bed#: [facility] 425-01 Encounter: [id number]      Impression/Plan:  1  Recurrent/persistent MSSA bacteremia with TV infective endocarditis   Blood cultures from 8/10 remain positive   Prolonged course of IV antibiotic was previously recommended, but patient has left AMA on 2 prior occasions (once at [facility] and once from [facility])  She remains hemodynamically stable despite her ongoing bacteremia      -continue cefazolin for now at current dose  -follow up repeat blood cultures  -if bacteremia persists, will change to nafcillin  -recheck CBC with diff and BMP  -once patient clears bacteremia, will need replacement of PICC line     2  Tricuspid valve endocarditis, 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  7/21 MELISSA showed large vegetation on TV with severe regurgitation  7/15 CT abdomen/pelvis also showed bilateral renal parenchymal abnormalities concerning for septic emboli   No intra-abdominal abscess     -antibiotic plan as above  -CT surgery is following  3  Right iliacus muscle abscesses-very small and unlikely to be able to be drained  At the level of the mid SI joint but the SI joint appears okay   -antibiotics as above  -pain management     4  Recent left foot cellulitis with abscess   Likely site of injection of IV drugs versus ectopic foci in the setting of MSSA bacteremia   This appears to have healed with no evidence of active infection   -antibiotic plan as above      5  Back pain   More likely secondary to chronic pain, opioid dependence   8/1/20 Thoracic and lumbar spine MRIs performed at [facility] [place] negative were for abscess     -monitor back pain   -serial exams   -Pain management is following      6   Active IV heroin abuse   This is the causative factor for development of bacteremia and infective endocarditis   Patient has left AMA on prior occasions   HIV screen negative  -patient is not a candidate for at home PICC line/IV antibiotics  -host evaluation if patient will agree     7  Chronic HCV infection, transaminitis   Recent HIV was negative  The LFTs are waxing waning  -monitor LFTs     Have discussed the above management plan with the primary service    Antibiotics:  Cefazolin restart 7    Subjective:  Patient still having intermittent fever; no nausea, vomiting, diarrhea; no cough, shortness of breath; continues to have pain  No new symptoms  She remains hemodynamically stable  She had PICC line placed yesterday as no other access options were available    Objective:  Vitals:  Temp:  [98 1 °F (36 7 °C)-100 8 °F (38 2 °C)] 98 4 °F (36 9 °C)  HR:  [] 105  Resp:  [18] 18  BP: (119-132)/(67-80) 122/72  SpO2:  [94 %-97 %] 96 %  Temp (24hrs), Av °F (37 2 °C), Min:98 1 °F (36 7 °C), Max:100 8 °F (38 2 °C)  Current: Temperature: 98 4 °F (36 9 °C)    Physical Exam:   General Appearance:  Alert, interactive, nontoxic, no acute distress  Throat: Oropharynx moist without lesions  Lungs:   Clear to auscultation bilaterally; no wheezes, rhonchi or rales; respirations unlabored   Heart:  Tachycardic; no murmur, rub or gallop   Abdomen:   Soft, non-tender, non-distended, positive bowel sounds  Extremities: No clubbing, cyanosis or edema   Skin: No new rashes or lesions  No draining wounds noted         Labs, Imaging, & Other studies:   All pertinent labs and imaging studies were personally reviewed  Results from last 7 days   Lab Units 20  0556 08/15/20  1544 20  0943   WBC Thousand/uL 11 66* 10 92* 10 64*   HEMOGLOBIN g/dL 7 8* 7 3* 7 4*   PLATELETS Thousands/uL 393* 359 360     Results from last 7 days   Lab Units 20  0556 08/15/20  1544 20  0943  08/10/20  0638   SODIUM mmol/L 135* 136 138   < > 134   POTASSIUM mmol/L 4 5 4 2 4 2   < > 2 9*   CHLORIDE mmol/L 103 105 106   < > 96   CO2 mmol/L 24 26 26   < > 26   BUN mg/dL 11 9 10   < > 15   CREATININE mg/dL 0 50* 0 47* 0 55*   < > 0 61   EGFR ml/min/1 73sq m 133 136 129   < > 125   CALCIUM mg/dL 8 5 8 0* 7 8*   < > 8 6   AST U/L 198*  --  45  --  134*   ALT U/L 60  --  18  --  59*   ALK PHOS U/L 139*  --  141*  --  130 0    < > = values in this interval not displayed  Results from last 7 days   Lab Units 08/15/20  1547 08/13/20  0446 08/10/20  2613 08/10/20  0546   BLOOD CULTURE  Received in Microbiology Lab  Culture in Progress   Staphylococcus aureus* Staphylococcus aureus* Staphylococcus aureus*  Staphylococcus coagulase negative*   GRAM STAIN RESULT   --  Gram positive cocci in clusters* Gram positive cocci in clusters* Gram positive cocci in clusters*         Results from last 7 days   Lab Units 08/10/20  0638   CRP mg/L 32 9*        CT right lower extremity-2 very small abscesses right posterior iliacus muscle    Images personally reviewed by me in PACS

## 2020-08-16 NOTE — PROGRESS NOTES
Patient requested to have a gynecological exam   She divulged that she has hidden illegal substances and paraphernalia in her vagina  She reported being concerned that she may have inadvertently left something behind  Notified Dr Daksha Fisher of the patient's concerns and request and was informed that orders are to follow for [facility] consult

## 2020-08-16 NOTE — PROGRESS NOTES
Progress Note - Leigh Ann Cohen 1990, 34 y o  female MRN: [id number]    Unit/Bed#: [facility] 425-01 Encounter: [id number]    Primary Care Provider: Agustin Ramirez PA-C   Date and time admitted to hospital: 8/12/2020  7:39 PM        Intravenous drug abuse [facility])  Assessment & Plan  · Patient with acute drug abuse and likely the source of bacteremia  · Patient has a history of signing against medical advise  · Pain control  · May benefit from drug rehab eventually  · During the hospital stay in July of this year patient was positive for [facility] ,cocaine and opiates  · Still pending urine drug screen   · Pt is refusing to urinate per nursing at this time I feel she will void when needed  She is demanding to have a purewick attached as she states she refuses to [employer]"  At this time we will work toward pain control  Back pain  Assessment & Plan  · Patient is complaining of severe back pain mainly in the lower part of the back and also in the sacral region  · Refuses examination due to pain  · Patient had MRI of the lumbar and thoracic spine during the recent hospital stay in [facility] which was unremarkable  But patient reports that her pain is much worse and she is screaming out saying that she is in severe pain and oxycodone is not helping her at all  Was changed to p o  Dilaudid over night     · Myself and acute pain at bedside discussed plan and pt seems to be calming down , adjustments made per acute pain recs   · - added robaxin 500 mg q 6  · - added Toradol 30 mg q 6 hrs per acute pain for 5 days monitor renal function   · - increased oxy to 10 mg and 15 mg   · - added iv morphine 4 mg q2 hrs prn for breakthrough  · -continue tylenol atc pt can refuse (she states it makes her break out in profuse sweats)   · - continue lidocaine patch  · Patient with history of intravenous drug abuse    Bipolar 1 disorder [facility])  Assessment & Plan  · Patient refused Psychiatry and neuropsych evaluation, once pain more controlled will discuss again and have their input   · Supportive care    Septic embolism [facility])  Assessment & Plan  · Patient noted to have abnormalities seen in the CT of the chest abdomen and pelvis concerning for septic emboli  · There is pleural effusion on the CT scan of the chest and also on repeat chest x-ray  · Patient is rather asymptomatic from pulmonary standpoint  · Continue with the antibiotics  (reconsulted ID)   · Thoracic surgery have evaluated the pt and she is not short of breath     8/14-patient complaining of acute right hip pain; concerning for possible embolism  Consider imaging and will discuss with acute pain service  8/15-imaging right hip shows no osseous involvement; concern for proximity of abscess status SI joint, but no SI joint involvement at this time  Continue antibiotics monitor for improvement  Acute bacterial endocarditis  Assessment & Plan  · Patient noted to have tricuspid while vegetation on echocardiogram done in July  Patient had a transthoracic and also transesophageal echocardiogram done during the last hospital stay  · Recent CT of the abdomen and pelvis and also CT chest shows abnormalities concerning for septic emboli  · Antibiotics per Infectious Disease  · Patient is transferred over here to be evaluated by CT surgery  · - at the time of their evaluation pt is bilgerant and uncooperative reportedly due to pain  · - she was noncommittal to abstain from illegal drugs or unintended use of medications  · - recommendations to continue iv abx use   · - when she continues to abstain and completes iv abx treatment they would consider surgical correction   · - would benefit from neuropsych and psychiatry input  · Upon my entry to room boundaries were set as pt was rude and saying the staff were "trash" I told her that is not acceptable and that there needs to be trust and respect    She is very sick and needs to complete treatment, or she could become septic and ultimately die  The pts mother is at bedside (works at [employer]) is trying to make pt be more cooperative  · Monitor respiratory status    8/15-repeat labs; awaiting repeat blood culture  Continue antibiotics as per ID  * Bacteremia due to Staphylococcus aureus  Assessment & Plan  · 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  Initially patient was admitted to [facility] from 07/15-blood culture were positive for MSSA bacteremia, but patient signed AMA on 07/22  Her repeat blood culture done on 7/21 was negative after 5 days  · Patient was admitted to [facility] on 07/30-blood culture came back positive for MSSA and she left again is medical advice on 08/04  Her blood cultures came back positive on 07/30, but the bacteremia cleared as of 8 /2  Patient left AMA on 08/04  · Patient was readmitted to [address] [place] [address] on 08/10-blood culture came back positive for Staph aureus  Currently on cefazolin per Infectious Disease  · 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 during the fast hospital stay from 7/15-7/22  · Continue with the antibiotics per Infectious Disease  · Repeat blood culture (pt has been refusing sticks extensive discussion had with the pt and the nursing staff to have the best person obtaining her blood)   · tmax 101  · CT surgery appreciated  · Patient also with previous history of MSSA bacteremia /possible endocarditis in 2018-    8/14-patient had 1 blood culture drawn yesterday; tentatively positive for staph, awaiting speciation is  Continue current treatment plan  - remains afebrile although tachycardic; will discuss with patient alone further cultures to be drawn tomorrow    8/15 - No vascular access prevented repeat draws this AM; will attempt again once vascular access is obtained    8/16-PICC line inserted yesterday; blood cultures x1 obtained and pending    As per ID, continue current antibiotics, some concern however given her fever overnight increasing white count and tachycardia; will defer to ID for possible adjustments in antibiotic therapy  VTE Pharmacologic Prophylaxis:   Pharmacologic: Enoxaparin (Lovenox)  Mechanical VTE Prophylaxis in Place: Yes    Patient Centered Rounds: I have performed bedside rounds with nursing staff today  Discussions with Specialists or Other Care Team Provider:  Radiology, acute pain service, Ob/gyn    Education and Discussions with Family / Patient: Discussed treatment plan with family and patient who agree with current plan; encouraged to ask questions and participate  Time Spent for Care: 45 minutes  More than 50% of total time spent on counseling and coordination of care as described above  Current Length of Stay: 4 day(s)    Current Patient Status: Inpatient   Certification Statement: The patient will continue to require additional inpatient hospital stay due to Treatment of endocarditis    Discharge Plan: To be determined    Code Status: Level 1 - Full Code      Subjective:   Patient seen examined bedside, still complains of exquisite right hip pain  Improved from yesterday however; imaging shows 2 small abscesses in the right hip adjacent to the SI joint, both shielded by bone and bowel  Have discussed with radiology; may report that abscesses may be difficult to drain given their proximity to bowel and bone; and so small that may not indicate significant hindrance to her improvement  Id note agrees  Will continue current antibiotics; however ID may broaden antibiotics given fever, tachycardia, and increasing white blood count  Monitor overnight  Patient had concerns today for possible retained foreign objects in the vaginal canal; OBGYN did thorough examination and found nothing  Obtained PICC access yesterday; not optimal given her bacteremia; but with history of extensive IV drug abuse, vascular access is exceedingly difficult to find  Cultures time 1 pending  Continue PICC line until blood cultures return negative and then will replace  Appreciate acute pain service; continue to monitor  Patient on ketamine drip with better behavior toward staff this morning  Objective:     Vitals:   Temp (24hrs), Av 9 °F (37 2 °C), Min:97 8 °F (36 6 °C), Max:100 8 °F (38 2 °C)    Temp:  [97 8 °F (36 6 °C)-100 8 °F (38 2 °C)] 97 8 °F (36 6 °C)  HR:  [] 97  Resp:  [18] 18  BP: (112-132)/(67-75) 112/71  SpO2:  [94 %-97 %] 96 %  Body mass index is 23 3 kg/m²  Input and Output Summary (last 24 hours): Intake/Output Summary (Last 24 hours) at 2020 1342  Last data filed at 2020 1311  Gross per 24 hour   Intake 209 81 ml   Output 1250 ml   Net -1040 19 ml       Physical Exam:     Physical Exam  Vitals signs and nursing note reviewed  Constitutional:       Comments: Distressed appearance   HENT:      Head: Normocephalic and atraumatic  Right Ear: External ear normal       Left Ear: External ear normal       Nose: Nose normal       Mouth/Throat:      Mouth: Mucous membranes are moist    Eyes:      Extraocular Movements: Extraocular movements intact  Conjunctiva/sclera: Conjunctivae normal       Pupils: Pupils are equal, round, and reactive to light  Neck:      Musculoskeletal: Normal range of motion and neck supple  Cardiovascular:      Rate and Rhythm: Tachycardia present  Musculoskeletal:         General: Tenderness present  Comments: Pain and right hip with reduced range of motion   Skin:     General: Skin is warm and dry  Neurological:      Mental Status: She is alert and oriented to person, place, and time     Psychiatric:      Comments: Anxious; dysphoric affect           Additional Data:     Labs:    Results from last 7 days   Lab Units 20  0556  20  0943 20  0447   WBC Thousand/uL 11 66*   < > 10 64* 9 97   HEMOGLOBIN g/dL 7 8*   < > 7 4* 7 5*   HEMATOCRIT % 24 4*   < > 22 7* 23 5* PLATELETS Thousands/uL 393*   < > 360 372   NEUTROS PCT %  --   --   --  60   LYMPHS PCT %  --   --   --  29   LYMPHO PCT %  --   --  26  --    MONOS PCT %  --   --   --  8   MONO PCT %  --   --  3*  --    EOS PCT %  --   --  3 2    < > = values in this interval not displayed  Results from last 7 days   Lab Units 08/16/20  0556   SODIUM mmol/L 135*   POTASSIUM mmol/L 4 5   CHLORIDE mmol/L 103   CO2 mmol/L 24   BUN mg/dL 11   CREATININE mg/dL 0 50*   ANION GAP mmol/L 8   CALCIUM mg/dL 8 5   ALBUMIN g/dL 1 8*   TOTAL BILIRUBIN mg/dL 0 37   ALK PHOS U/L 139*   ALT U/L 60   AST U/L 198*   GLUCOSE RANDOM mg/dL 108     Results from last 7 days   Lab Units 08/10/20  0638   INR  1 20*                       * I Have Reviewed All Lab Data Listed Above  * Additional Pertinent Lab Tests Reviewed: All Labs Within Last 24 Hours Reviewed    Imaging:    Imaging Reports Reviewed Today Include:  CT hip  Imaging Personally Reviewed by Myself Includes:  CT hip    Recent Cultures (last 7 days):     Results from last 7 days   Lab Units 08/15/20  1547 08/13/20  0446 08/10/20  0638 08/10/20  0546   BLOOD CULTURE  Received in Microbiology Lab  Culture in Progress   Staphylococcus aureus* Staphylococcus aureus* Staphylococcus aureus*  Staphylococcus coagulase negative*   GRAM STAIN RESULT   --  Gram positive cocci in clusters* Gram positive cocci in clusters* Gram positive cocci in clusters*       Last 24 Hours Medication List:   Current Facility-Administered Medications   Medication Dose Route Frequency Provider Last Rate    acetaminophen  975 mg Oral [facility] Emery Stuart MD      calcium carbonate  1,000 mg Oral Daily PRN Emery Stuart MD      cefazolin  2,000 mg Intravenous Q8H Asha Roca PA-C 2,000 mg (08/16/20 0718)    enoxaparin  40 mg Subcutaneous Q24H [address] Payton Serrano MD      gabapentin  100 mg Oral TID Emery Stuart MD      glycopyrrolate  0 2 mg Intravenous Q4H PRN Delfino Vela PA-C      haloperidol lactate  2 mg Intramuscular Q30 Min PRN Nasreen Corcoran PA-C      ketamine  0 2 mg/kg/hr Intravenous Continuous Nasreen Corcoran PA-C 0 2 mg/kg/hr (08/16/20 7877)    ketorolac  30 mg Intravenous Q6H [facility] Oiler, CRNP      lidocaine  2 patch Topical Daily Asha Roca PA-C      LORazepam  1 mg Intravenous Q1H PRN Nasreen Corcoran PA-C      methocarbamol  500 mg Oral Q6H [facility] Oiler, CRNP      mirtazapine  30 mg Oral HS Margarito Gordon MD      morphine injection  4 mg Intravenous Q2H PRN [place], CRNP      ondansetron  4 mg Intravenous Q6H PRN Margarito Gordon MD      oxyCODONE  10 mg Oral Q4H PRN [facility], CRNP      oxyCODONE  15 mg Oral Q4H PRN [place] O[place]fty, CRNP      polyethylene glycol  17 g Oral Daily PRN Brett Elizalde MD      senna  1 tablet Oral BID Brett Elizalde MD          Today, Patient Was Seen By: Rebekah Amezcua MD    ** Please Note: Dictation voice to text software may have been used in the creation of this document   **

## 2020-08-16 NOTE — CONSULTS
Consult - Gynecology   Ayan Stevens 34 y o  female MRN: [id number]  [facility] 907-34 Encounter: [id number]    No chief complaint on file  Assessment/Plan     34 y o  Dakotah Toth with concern for retained drug paraphernalia in the vagina  Speculum exam today no retained objects in the vagina  On bimanual exam no objects palpated in th vagina  Lily Jose that once discharge to follow up with [facility] for her routine GYN care - due for repeat pap smear and annual exam   --------------------------------------------------------    History of Present Illness     Sony Mccann is a 34 y o  female  No LMP recorded (lmp unknown)  uncertain LMP currently using abstinence  for contraception admitted for bacteremia in the setting of IV drug abuse  GYN consulted for concern for possible drug paraphernalia in the vaginal canal   Patient reports that she sometimes places baggies in her vagina to hide them and is uncertain whether one might remain  Has a friend with that contracted toxic shock syndrome who needed all four limbs amputated      REVIEW OF SYSTEMS  No abnormal bleeding or vaginal pain    Past Medical History:   Diagnosis Date    Abnormal Pap smear of cervix     Anxiety     Depression     Endocarditis     2018    Hepatitis C     HPV (human papilloma virus) anogenital infection        Patient Active Problem List   Diagnosis    40 weeks gestation of pregnancy    Spontaneous vaginal delivery    Acute pyelonephritis    Loculated pleural effusion    Heroin abuse ([address] )    Bacteremia due to Staphylococcus aureus    Abscess of left foot    Insomnia    Acute bacterial endocarditis    Septic embolism (HCC)    Hypokalemia    Bipolar 1 disorder (HCC)    Back pain    Intravenous drug abuse ([address] )       Past Surgical History:   Procedure Laterality Date    KNEE SURGERY Left     MOUTH SURGERY         OB History    1    Para   1    Term   1            AB        Living   1       SAB        TAB        Ectopic        Multiple   0    Live Births   1                 # 1 - Date: 17, Sex: Female, Weight: 3544 g (7 lb 13 oz), GA: 40w6d, Delivery: Vaginal, Spontaneous, Apgar1: 9, Apgar5: 9, Living: Living, Birth Comments: None         GYN History  LMP unknown  irregular periods  Positive history abnormal Pap smears  Uncertain if history of cryotherapy, LEEP, or cold knife cone of the cervix    Health maintenance  Last pap smear:  NILM    History reviewed  No pertinent family history      Social History   Social History     Substance and Sexual Activity   Alcohol Use Not Currently    Alcohol/week: 0 0 standard drinks     Social History     Substance and Sexual Activity   Drug Use Yes    Types: Heroin    Comment: last use - one week ago     Social History     Tobacco Use   Smoking Status Former Smoker    Packs/day: 0 25    Types: Cigarettes    Last attempt to quit: 2016    Years since quitting: 3 7   Smokeless Tobacco Never Used   Tobacco Comment    current everyday smoker per [place]       Sexually active? no  Current sexual partner(s): n/a  History of STD: ys, chlamydia      Current Facility-Administered Medications:     acetaminophen (TYLENOL) tablet 975 mg, 975 mg, Oral, Q8H [address], Timmy Mehta MD, 975 mg at 20 0559    calcium carbonate (TUMS) chewable tablet 1,000 mg, 1,000 mg, Oral, Daily PRN, Timmy Mehta MD    ceFAZolin (ANCEF) IVPB (premix) 2,000 mg 50 mL, 2,000 mg, Intravenous, Q8H, Asha Roca PA-C, Last Rate: 100 mL/hr at 20 0718, 2,000 mg at 20 0718    enoxaparin (LOVENOX) subcutaneous injection 40 mg, 40 mg, Subcutaneous, Q24H [address], Dillon Kim MD, 40 mg at 20 0916    gabapentin (NEURONTIN) capsule 100 mg, 100 mg, Oral, TID, Timmy Mehta MD, 100 mg at 20 0916    glycopyrrolate (ROBINUL) injection 0 2 mg, 0 2 mg, Intravenous, Q4H PRN, Amalia Iqbal MILLIE Kim    haloperidol lactate (HALDOL) injection 2 mg, 2 mg, Intramuscular, Q30 Min PRN, Samantha Shahid PA-C    ketamine 250 mg (STANDARD CONCENTRATION) IV in sodium chloride 0 9% 250 mL, 0 2 mg/kg/hr, Intravenous, Continuous, Samantha Shahid PA-C, Last Rate: 12 7 mL/hr at 08/16/20 0628, 0 2 mg/kg/hr at 08/16/20 7361    ketorolac (TORADOL) injection 30 mg, 30 mg, Intravenous, Q6H [address], Vassie Downer, CRNP, 30 mg at 08/16/20 1136    lidocaine (LIDODERM) 5 % patch 2 patch, 2 patch, Topical, Daily, Asha Roca PA-C    LORazepam (ATIVAN) injection 1 mg, 1 mg, Intravenous, Q1H PRN, Samantha Shahid PA-C, 1 mg at 08/16/20 0134    methocarbamol (ROBAXIN) tablet 500 mg, 500 mg, Oral, Q6H [address], Vassie Downer, CRNP, 500 mg at 08/16/20 1136    mirtazapine (REMERON) tablet 30 mg, 30 mg, Oral, HS, Timmy Mehta MD, 30 mg at 08/15/20 2200    morphine (PF) 4 mg/mL injection 4 mg, 4 mg, Intravenous, Q2H PRN, Vassie Downer, CRNP, 4 mg at 08/16/20 0715    ondansetron (ZOFRAN) injection 4 mg, 4 mg, Intravenous, Q6H PRN, Timmy Mehta MD    oxyCODONE (ROXICODONE) immediate release tablet 10 mg, 10 mg, Oral, Q4H PRN, Vassie Downer, CRNP    oxyCODONE (ROXICODONE) IR tablet 15 mg, 15 mg, Oral, Q4H PRN, Vassie Downer, CRNP, 15 mg at 08/16/20 1136    polyethylene glycol (MIRALAX) packet 17 g, 17 g, Oral, Daily PRN, Dillon Kim MD    [facility]) tablet 8 6 mg, 1 tablet, Oral, BID, Dillon Kim MD, 8 6 mg at 08/16/20 0215    Allergies   Allergen Reactions    Cat Hair Extract     Dog Epithelium     Latex     Pollen Extract        Objective   Vitals: Blood pressure 112/71, pulse 97, temperature 97 8 °F (36 6 °C), temperature source Oral, resp  rate 18, height 5' 5" (1 651 m), weight 63 5 kg (139 lb 15 9 oz), SpO2 96 %, not currently breastfeeding  Body mass index is 23 3 kg/m²      General: NAD, some slurred speech  Chest: non-labored breathing, symmetric chest rise    Pelvic: normal external female genitalia  On speculum exam, physiologic discharge  Cervix parous no lesion No products noted within vagina  On bimanual exam, no cervical motion tenderness, no mass noted    Nothing palpated within posterior and anterior culdesacs    Lab Results:   Admission on 08/12/2020   Component Date Value    Sodium 08/13/2020 139     Potassium 08/13/2020 3 1*    Chloride 08/13/2020 103     CO2 08/13/2020 28     ANION GAP 08/13/2020 8     BUN 08/13/2020 10     Creatinine 08/13/2020 0 40*    Glucose 08/13/2020 108     Calcium 08/13/2020 7 9*    eGFR 08/13/2020 143     WBC 08/13/2020 9 97     RBC 08/13/2020 2 83*    Hemoglobin 08/13/2020 7 5*    Hematocrit 08/13/2020 23 5*    MCV 08/13/2020 83     MCH 08/13/2020 26 5*    MCHC 08/13/2020 31 9     RDW 08/13/2020 15 1     MPV 08/13/2020 9 7     Platelets 48/30/1133 372     nRBC 08/13/2020 0     Neutrophils Relative 08/13/2020 60     Immat GRANS % 08/13/2020 1     Lymphocytes Relative 08/13/2020 29     Monocytes Relative 08/13/2020 8     Eosinophils Relative 08/13/2020 2     Basophils Relative 08/13/2020 0     Neutrophils Absolute 08/13/2020 5 87     Immature Grans Absolute 08/13/2020 0 14     Lymphocytes Absolute 08/13/2020 2 93     Monocytes Absolute 08/13/2020 0 79     Eosinophils Absolute 08/13/2020 0 21     Basophils Absolute 08/13/2020 0 03     Magnesium 08/13/2020 2 1     Sed Rate 08/13/2020 82*    Blood Culture 08/13/2020 Staphylococcus aureus*    Gram Stain Result 08/13/2020 Gram positive cocci in clusters*    Sodium 08/14/2020 138     Potassium 08/14/2020 4 2     Chloride 08/14/2020 106     CO2 08/14/2020 26     ANION GAP 08/14/2020 6     BUN 08/14/2020 10     Creatinine 08/14/2020 0 55*    Glucose 08/14/2020 104     Calcium 08/14/2020 7 8*    AST 08/14/2020 45     ALT 08/14/2020 18     Alkaline Phosphatase 08/14/2020 141*    Total Protein 08/14/2020 6 6     Albumin 08/14/2020 1 6*    Total Bilirubin 08/14/2020 0 25     eGFR 08/14/2020 129     WBC 08/14/2020 10 64*    RBC 08/14/2020 2 69*    Hemoglobin 08/14/2020 7 4*    Hematocrit 08/14/2020 22 7*    MCV 08/14/2020 84     MCH 08/14/2020 27 5     MCHC 08/14/2020 32 6     RDW 08/14/2020 14 9     MPV 08/14/2020 9 3     Platelets 26/42/0358 360     nRBC 08/14/2020 0     Segmented % 08/14/2020 68     Lymphocytes % 08/14/2020 26     Monocytes % 08/14/2020 3*    Eosinophils, % 08/14/2020 3     Basophils % 08/14/2020 0     Absolute Neutrophils 08/14/2020 7 24     Lymphocytes Absolute 08/14/2020 2 77     Monocytes Absolute 08/14/2020 0 32     Eosinophils Absolute 08/14/2020 0 32     Basophils Absolute 08/14/2020 0 00     RBC Morphology 08/14/2020 Normal     Platelet Estimate 15/14/0930 Adequate     Blood Culture 08/15/2020 Received in Microbiology Lab  Culture in Progress       WBC 08/15/2020 10 92*    RBC 08/15/2020 2 69*    Hemoglobin 08/15/2020 7 3*    Hematocrit 08/15/2020 22 7*    MCV 08/15/2020 84     MCH 08/15/2020 27 1     MCHC 08/15/2020 32 2     RDW 08/15/2020 14 8     Platelets 77/18/3185 359     MPV 08/15/2020 8 7*    Sodium 08/15/2020 136     Potassium 08/15/2020 4 2     Chloride 08/15/2020 105     CO2 08/15/2020 26     ANION GAP 08/15/2020 5     BUN 08/15/2020 9     Creatinine 08/15/2020 0 47*    Glucose 08/15/2020 97     Calcium 08/15/2020 8 0*    eGFR 08/15/2020 136     WBC 08/16/2020 11 66*    RBC 08/16/2020 2 90*    Hemoglobin 08/16/2020 7 8*    Hematocrit 08/16/2020 24 4*    MCV 08/16/2020 84     MCH 08/16/2020 26 9     MCHC 08/16/2020 32 0     RDW 08/16/2020 14 9     MPV 08/16/2020 8 8*    Platelets 17/57/1306 393*    nRBC 08/16/2020 0     Sodium 08/16/2020 135*    Potassium 08/16/2020 4 5     Chloride 08/16/2020 103     CO2 08/16/2020 24     ANION GAP 08/16/2020 8     BUN 08/16/2020 11     Creatinine 08/16/2020 0 50*    Glucose 08/16/2020 108     Calcium 08/16/2020 8 5     AST 08/16/2020 198*    ALT 08/16/2020 60     Alkaline Phosphatase 08/16/2020 139*    Total Protein 08/16/2020 7 3     Albumin 08/16/2020 1 8*    Total Bilirubin 08/16/2020 0 37     eGFR 08/16/2020 133     Magnesium 08/16/2020 2 0     Phosphorus 08/16/2020 3 4

## 2020-08-17 LAB
ANION GAP SERPL CALCULATED.3IONS-SCNC: 7 MMOL/L (ref 4–13)
ANISOCYTOSIS BLD QL SMEAR: PRESENT
BASOPHILS # BLD MANUAL: 0 THOUSAND/UL (ref 0–0.1)
BASOPHILS NFR MAR MANUAL: 0 % (ref 0–1)
BUN SERPL-MCNC: 11 MG/DL (ref 5–25)
CALCIUM SERPL-MCNC: 8.3 MG/DL (ref 8.3–10.1)
CHLORIDE SERPL-SCNC: 105 MMOL/L (ref 100–108)
CO2 SERPL-SCNC: 26 MMOL/L (ref 21–32)
CREAT SERPL-MCNC: 0.53 MG/DL (ref 0.6–1.3)
EOSINOPHIL # BLD MANUAL: 0 THOUSAND/UL (ref 0–0.4)
EOSINOPHIL NFR BLD MANUAL: 0 % (ref 0–6)
ERYTHROCYTE [DISTWIDTH] IN BLOOD BY AUTOMATED COUNT: 14.7 % (ref 11.6–15.1)
GFR SERPL CREATININE-BSD FRML MDRD: 131 ML/MIN/1.73SQ M
GIANT PLATELETS BLD QL SMEAR: PRESENT
GLUCOSE SERPL-MCNC: 85 MG/DL (ref 65–140)
HCT VFR BLD AUTO: 23.8 % (ref 34.8–46.1)
HGB BLD-MCNC: 7.4 G/DL (ref 11.5–15.4)
LYMPHOCYTES # BLD AUTO: 2.04 THOUSAND/UL (ref 0.6–4.47)
LYMPHOCYTES # BLD AUTO: 20 % (ref 14–44)
MCH RBC QN AUTO: 26.7 PG (ref 26.8–34.3)
MCHC RBC AUTO-ENTMCNC: 31.1 G/DL (ref 31.4–37.4)
MCV RBC AUTO: 86 FL (ref 82–98)
METAMYELOCYTES NFR BLD MANUAL: 1 % (ref 0–1)
MICROCYTES BLD QL AUTO: PRESENT
MONOCYTES # BLD AUTO: 0.72 THOUSAND/UL (ref 0–1.22)
MONOCYTES NFR BLD: 7 % (ref 4–12)
NEUTROPHILS # BLD MANUAL: 6.95 THOUSAND/UL (ref 1.85–7.62)
NEUTS SEG NFR BLD AUTO: 68 % (ref 43–75)
NRBC BLD AUTO-RTO: 0 /100 WBCS
PLATELET # BLD AUTO: 368 THOUSANDS/UL (ref 149–390)
PLATELET BLD QL SMEAR: ABNORMAL
PMV BLD AUTO: 9.3 FL (ref 8.9–12.7)
POTASSIUM SERPL-SCNC: 4.5 MMOL/L (ref 3.5–5.3)
RBC # BLD AUTO: 2.77 MILLION/UL (ref 3.81–5.12)
RBC MORPH BLD: PRESENT
SODIUM SERPL-SCNC: 138 MMOL/L (ref 136–145)
VARIANT LYMPHS # BLD AUTO: 4 %
WBC # BLD AUTO: 10.22 THOUSAND/UL (ref 4.31–10.16)

## 2020-08-17 PROCEDURE — 85027 COMPLETE CBC AUTOMATED: CPT | Performed by: INTERNAL MEDICINE

## 2020-08-17 PROCEDURE — 99232 SBSQ HOSP IP/OBS MODERATE 35: CPT | Performed by: INTERNAL MEDICINE

## 2020-08-17 PROCEDURE — 85007 BL SMEAR W/DIFF WBC COUNT: CPT | Performed by: INTERNAL MEDICINE

## 2020-08-17 PROCEDURE — 99232 SBSQ HOSP IP/OBS MODERATE 35: CPT | Performed by: PHYSICIAN ASSISTANT

## 2020-08-17 PROCEDURE — 80048 BASIC METABOLIC PNL TOTAL CA: CPT | Performed by: INTERNAL MEDICINE

## 2020-08-17 RX ORDER — SACCHAROMYCES BOULARDII 250 MG
250 CAPSULE ORAL 2 TIMES DAILY
Status: DISCONTINUED | OUTPATIENT
Start: 2020-08-17 | End: 2020-09-28 | Stop reason: HOSPADM

## 2020-08-17 RX ORDER — MAGNESIUM CARB/ALUMINUM HYDROX 105-160MG
296 TABLET,CHEWABLE ORAL DAILY PRN
Status: DISCONTINUED | OUTPATIENT
Start: 2020-08-17 | End: 2020-09-13

## 2020-08-17 RX ADMIN — OXYCODONE HYDROCHLORIDE 15 MG: 10 TABLET ORAL at 21:19

## 2020-08-17 RX ADMIN — MORPHINE SULFATE 4 MG: 4 INJECTION INTRAVENOUS at 04:30

## 2020-08-17 RX ADMIN — LORAZEPAM 1 MG: 2 INJECTION INTRAMUSCULAR; INTRAVENOUS at 13:07

## 2020-08-17 RX ADMIN — MORPHINE SULFATE 4 MG: 4 INJECTION INTRAVENOUS at 15:17

## 2020-08-17 RX ADMIN — METHOCARBAMOL 500 MG: 500 TABLET, FILM COATED ORAL at 17:30

## 2020-08-17 RX ADMIN — METHOCARBAMOL 500 MG: 500 TABLET, FILM COATED ORAL at 12:48

## 2020-08-17 RX ADMIN — LORAZEPAM 1 MG: 2 INJECTION INTRAMUSCULAR; INTRAVENOUS at 17:34

## 2020-08-17 RX ADMIN — KETOROLAC TROMETHAMINE 30 MG: 30 INJECTION, SOLUTION INTRAMUSCULAR at 17:30

## 2020-08-17 RX ADMIN — MORPHINE SULFATE 4 MG: 4 INJECTION INTRAVENOUS at 23:42

## 2020-08-17 RX ADMIN — OXYCODONE HYDROCHLORIDE 15 MG: 10 TABLET ORAL at 02:46

## 2020-08-17 RX ADMIN — CEFAZOLIN SODIUM 2000 MG: 2 SOLUTION INTRAVENOUS at 15:17

## 2020-08-17 RX ADMIN — MORPHINE SULFATE 4 MG: 4 INJECTION INTRAVENOUS at 09:43

## 2020-08-17 RX ADMIN — CEFAZOLIN SODIUM 2000 MG: 2 SOLUTION INTRAVENOUS at 06:17

## 2020-08-17 RX ADMIN — MIRTAZAPINE 30 MG: 30 TABLET, FILM COATED ORAL at 21:19

## 2020-08-17 RX ADMIN — GABAPENTIN 100 MG: 100 CAPSULE ORAL at 21:15

## 2020-08-17 RX ADMIN — Medication 250 MG: at 17:30

## 2020-08-17 RX ADMIN — Medication 250 MG: at 12:56

## 2020-08-17 RX ADMIN — CEFAZOLIN SODIUM 2000 MG: 2 SOLUTION INTRAVENOUS at 23:43

## 2020-08-17 RX ADMIN — KETOROLAC TROMETHAMINE 30 MG: 30 INJECTION, SOLUTION INTRAMUSCULAR at 05:32

## 2020-08-17 RX ADMIN — KETOROLAC TROMETHAMINE 30 MG: 30 INJECTION, SOLUTION INTRAMUSCULAR at 23:42

## 2020-08-17 RX ADMIN — LIDOCAINE 5% 2 PATCH: 700 PATCH TOPICAL at 06:17

## 2020-08-17 RX ADMIN — METHOCARBAMOL 500 MG: 500 TABLET, FILM COATED ORAL at 05:32

## 2020-08-17 RX ADMIN — OXYCODONE HYDROCHLORIDE 15 MG: 10 TABLET ORAL at 08:30

## 2020-08-17 RX ADMIN — KETOROLAC TROMETHAMINE 30 MG: 30 INJECTION, SOLUTION INTRAMUSCULAR at 12:48

## 2020-08-17 RX ADMIN — METHOCARBAMOL 500 MG: 500 TABLET, FILM COATED ORAL at 23:43

## 2020-08-17 RX ADMIN — Medication 0.2 MG/KG/HR: at 09:43

## 2020-08-17 RX ADMIN — LORAZEPAM 1 MG: 2 INJECTION INTRAMUSCULAR; INTRAVENOUS at 21:19

## 2020-08-17 RX ADMIN — GABAPENTIN 100 MG: 100 CAPSULE ORAL at 15:18

## 2020-08-17 RX ADMIN — OXYCODONE HYDROCHLORIDE 15 MG: 10 TABLET ORAL at 12:48

## 2020-08-17 RX ADMIN — MORPHINE SULFATE 4 MG: 4 INJECTION INTRAVENOUS at 19:30

## 2020-08-17 RX ADMIN — OXYCODONE HYDROCHLORIDE 15 MG: 10 TABLET ORAL at 17:29

## 2020-08-17 RX ADMIN — SENNOSIDES 8.6 MG: 8.6 TABLET ORAL at 17:29

## 2020-08-17 RX ADMIN — GABAPENTIN 100 MG: 100 CAPSULE ORAL at 08:30

## 2020-08-17 RX ADMIN — ENOXAPARIN SODIUM 40 MG: 40 INJECTION SUBCUTANEOUS at 08:30

## 2020-08-17 NOTE — PROGRESS NOTES
Progress Note - Narendra Genao 1990, 34 y o  female MRN: [id number]    Unit/Bed#: [facility] 425-01 Encounter: [id number]    Primary Care Provider: Erika Ahn PA-C   Date and time admitted to hospital: 8/12/2020  7:39 PM        Intravenous drug abuse [facility])  Assessment & Plan  · Patient with acute drug abuse and likely the source of bacteremia  · Patient has a history of signing against medical advise  · Pain control  · May benefit from drug rehab eventually  · During the hospital stay in July of this year patient was positive for [facility] ,cocaine and opiates  · Still pending urine drug screen   · Pt is refusing to urinate per nursing at this time I feel she will void when needed  She is demanding to have a purewick attached as she states she refuses to [employer]"  At this time we will work toward pain control  Back pain  Assessment & Plan  · Patient is complaining of severe back pain mainly in the lower part of the back and also in the sacral region  · Refuses examination due to pain  · Patient had MRI of the lumbar and thoracic spine during the recent hospital stay in [facility] which was unremarkable  But patient reports that her pain is much worse and she is screaming out saying that she is in severe pain and oxycodone is not helping her at all  Was changed to p o  Dilaudid over night     · Myself and acute pain at bedside discussed plan and pt seems to be calming down , adjustments made per acute pain recs   · - added robaxin 500 mg q 6  · - added Toradol 30 mg q 6 hrs per acute pain for 5 days monitor renal function   · - increased oxy to 10 mg and 15 mg   · - added iv morphine 4 mg q2 hrs prn for breakthrough  · -continue tylenol atc pt can refuse (she states it makes her break out in profuse sweats)   · - continue lidocaine patch  · Patient with history of intravenous drug abuse    Bipolar 1 disorder [facility])  Assessment & Plan  · Patient refused Psychiatry and neuropsych evaluation, once pain more controlled will discuss again and have their input   · Supportive care    Septic embolism [facility])  Assessment & Plan  · Patient noted to have abnormalities seen in the CT of the chest abdomen and pelvis concerning for septic emboli  · There is pleural effusion on the CT scan of the chest and also on repeat chest x-ray  · Patient is rather asymptomatic from pulmonary standpoint  · Continue with the antibiotics  (reconsulted ID)   · Thoracic surgery have evaluated the pt and she is not short of breath     8/14-patient complaining of acute right hip pain; concerning for possible embolism  Consider imaging and will discuss with acute pain service  8/15-imaging right hip shows no osseous involvement; concern for proximity of abscess status SI joint, but no SI joint involvement at this time  Continue antibiotics monitor for improvement  Acute bacterial endocarditis  Assessment & Plan  · Patient noted to have tricuspid while vegetation on echocardiogram done in July  Patient had a transthoracic and also transesophageal echocardiogram done during the last hospital stay  · Recent CT of the abdomen and pelvis and also CT chest shows abnormalities concerning for septic emboli  · Antibiotics per Infectious Disease  · Patient is transferred over here to be evaluated by CT surgery  · - at the time of their evaluation pt is bilgerant and uncooperative reportedly due to pain  · - she was noncommittal to abstain from illegal drugs or unintended use of medications  · - recommendations to continue iv abx use   · - when she continues to abstain and completes iv abx treatment they would consider surgical correction   · - would benefit from neuropsych and psychiatry input  · Upon my entry to room boundaries were set as pt was rude and saying the staff were "trash" I told her that is not acceptable and that there needs to be trust and respect    She is very sick and needs to complete treatment, or she could become septic and ultimately die  The pts mother is at bedside (works at [employer]) is trying to make pt be more cooperative  · Monitor respiratory status    8/15-repeat labs; awaiting repeat blood culture  Continue antibiotics as per ID  * Bacteremia due to Staphylococcus aureus  Assessment & Plan  · 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  Initially patient was admitted to [address] [place] [address] from 07/15-blood culture were positive for MSSA bacteremia, but patient signed AMA on 07/22  Her repeat blood culture done on 7/21 was negative after 5 days  · Patient was admitted to [facility] on 07/30-blood culture came back positive for MSSA and she left again is medical advice on 08/04  Her blood cultures came back positive on 07/30, but the bacteremia cleared as of 8 /2  Patient left AMA on 08/04  · Patient was readmitted to [address] [place] [address] on 08/10-blood culture came back positive for Staph aureus  Currently on cefazolin per Infectious Disease  · 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 during the fast hospital stay from 7/15-7/22  · Continue with the antibiotics per Infectious Disease  · Repeat blood culture (pt has been refusing sticks extensive discussion had with the pt and the nursing staff to have the best person obtaining her blood)   · tmax 101  · CT surgery appreciated  · Patient also with previous history of MSSA bacteremia /possible endocarditis in 2018-    8/14-patient had 1 blood culture drawn yesterday; tentatively positive for staph, awaiting speciation is  Continue current treatment plan  - remains afebrile although tachycardic; will discuss with patient alone further cultures to be drawn tomorrow    8/15 - No vascular access prevented repeat draws this AM; will attempt again once vascular access is obtained    8/16-PICC line inserted yesterday; blood cultures x1 obtained and pending    As per ID, continue current antibiotics, some concern however given her fever overnight increasing white count and tachycardia; will defer to ID for possible adjustments in antibiotic therapy  - bottle blood cultures negative times 24 hours  Will eventually need PICC line replaced in 24-48 hours  Case management report HOST consult pending; total of 6 week antibiotics needed clear bacteremia  VTE Pharmacologic Prophylaxis:   Pharmacologic: Enoxaparin (Lovenox)  Mechanical VTE Prophylaxis in Place: Yes    Patient Centered Rounds: I have performed bedside rounds with nursing staff today  Discussions with Specialists or Other Care Team Provider:  Acute pain service    Education and Discussions with Family / Patient: Discussed treatment plan with family and patient who agree with current plan; encouraged to ask questions and participate  Time Spent for Care: 30 minutes  More than 50% of total time spent on counseling and coordination of care as described above  Current Length of Stay: 5 day(s)    Current Patient Status: Inpatient   Certification Statement: The patient will continue to require additional inpatient hospital stay due to Treatment of endocarditis    Discharge Plan: To be determined    Code Status: Level 1 - Full Code      Subjective:   Patient seen and examined bedside, appears much more comfortable today  P r n  Senna and Mag citrate for constipation  Patient requesting probiotics  Blood cultures negative x1 at 12:00 p m  Mariposa Cassette Discussed with patient need for replacing PICC line, she seems hesitant but understands  Labs and vital stable, continue treatment as planned  Objective:     Vitals:   Temp (24hrs), Av 3 °F (36 8 °C), Min:97 8 °F (36 6 °C), Max:99 °F (37 2 °C)    Temp:  [97 8 °F (36 6 °C)-99 °F (37 2 °C)] 98 2 °F (36 8 °C)  HR:  [] 107  Resp:  [18-19] 19  BP: (112-128)/(64-78) 117/77  SpO2:  [94 %-97 %] 95 %  Body mass index is 23 3 kg/m²       Input and Output Summary (last 24 hours): Intake/Output Summary (Last 24 hours) at 8/17/2020 1027  Last data filed at 8/17/2020 0943  Gross per 24 hour   Intake 325 97 ml   Output 1800 ml   Net -1474 03 ml       Physical Exam:     Physical Exam  Vitals signs and nursing note reviewed  Constitutional:       Comments: Distressed appearance   HENT:      Head: Normocephalic and atraumatic  Right Ear: External ear normal       Left Ear: External ear normal       Nose: Nose normal       Mouth/Throat:      Mouth: Mucous membranes are moist    Eyes:      Extraocular Movements: Extraocular movements intact  Conjunctiva/sclera: Conjunctivae normal       Pupils: Pupils are equal, round, and reactive to light  Neck:      Musculoskeletal: Normal range of motion and neck supple  Cardiovascular:      Rate and Rhythm: Tachycardia present  Musculoskeletal:         General: Tenderness present  Comments: Pain and right hip with reduced range of motion   Skin:     General: Skin is warm and dry  Neurological:      Mental Status: She is alert and oriented to person, place, and time  Psychiatric:         Mood and Affect: Mood normal          Behavior: Behavior normal            Additional Data:     Labs:    Results from last 7 days   Lab Units 08/17/20  0427  08/14/20  0943 08/13/20  0447   WBC Thousand/uL 10 22*   < > 10 64* 9 97   HEMOGLOBIN g/dL 7 4*   < > 7 4* 7 5*   HEMATOCRIT % 23 8*   < > 22 7* 23 5*   PLATELETS Thousands/uL 368   < > 360 372   NEUTROS PCT %  --   --   --  60   LYMPHS PCT %  --   --   --  29   LYMPHO PCT %  --   --  26  --    MONOS PCT %  --   --   --  8   MONO PCT %  --   --  3*  --    EOS PCT %  --   --  3 2    < > = values in this interval not displayed       Results from last 7 days   Lab Units 08/17/20  0427 08/16/20  0556   SODIUM mmol/L 138 135*   POTASSIUM mmol/L 4 5 4 5   CHLORIDE mmol/L 105 103   CO2 mmol/L 26 24   BUN mg/dL 11 11   CREATININE mg/dL 0 53* 0 50*   ANION GAP mmol/L 7 8   CALCIUM mg/dL 8 3 8 5 ALBUMIN g/dL  --  1 8*   TOTAL BILIRUBIN mg/dL  --  0 37   ALK PHOS U/L  --  139*   ALT U/L  --  60   AST U/L  --  198*   GLUCOSE RANDOM mg/dL 85 108                           * I Have Reviewed All Lab Data Listed Above  * Additional Pertinent Lab Tests Reviewed: All Labs Within Last 24 Hours Reviewed    Imaging:    Imaging Reports Reviewed Today Include:   Imaging Personally Reviewed by Myself Includes:      Recent Cultures (last 7 days):     Results from last 7 days   Lab Units 08/15/20  1547 08/13/20  0446   BLOOD CULTURE  No Growth at 24 hrs   Staphylococcus aureus*   GRAM STAIN RESULT   --  Gram positive cocci in clusters*       Last 24 Hours Medication List:   Current Facility-Administered Medications   Medication Dose Route Frequency Provider Last Rate    calcium carbonate  1,000 mg Oral Daily PRN Elian Brady MD      cefazolin  2,000 mg Intravenous Q8H Asha Roca PA-C 2,000 mg (08/17/20 0617)    enoxaparin  40 mg Subcutaneous Q24H [address] Shira Pollard MD      gabapentin  100 mg Oral TID Elina Brady MD      glycopyrrolate  0 2 mg Intravenous Q4H PRN Lyric Lemus PA-C      haloperidol lactate  2 mg Intramuscular Q30 Min PRN Lyric Lemus PA-C      ketamine  0 2 mg/kg/hr Intravenous Continuous Lyric Lemus PA-C 0 2 mg/kg/hr (08/17/20 0943)    ketorolac  30 mg Intravenous Q6H [address] Lynsusie Sánchez, BOO      lidocaine  2 patch Topical Daily Asha Roca PA-C      LORazepam  1 mg Intravenous Q1H PRN Lyric Lemus PA-C      magnesium citrate  296 mL Oral Daily PRN Shira Pollard MD      methocarbamol  500 mg Oral [facility]DR[facility] Lynsusie Sánchez, CRMARKO      mirtazapine  30 mg Oral HS Elina Brady MD      morphine injection  4 mg Intravenous Q2H PRN Zuleyma Sánchez, BOO      ondansetron  4 mg Intravenous Q6H PRN Elina Brady MD      oxyCODONE  10 mg Oral Q4H PRN Zuleyma Hoodoked, CRMARKO      oxyCODONE  15 mg Oral Q4H PRN Zuleyma Hoodoked, BOO      saccharomyces boulardii  250 mg Oral BID Ronnell Crespo MD      senna  1 tablet Oral BID Ronnell Crespo MD          Today, Patient Was Seen By: Abbe Esquivel MD    ** Please Note: Dictation voice to text software may have been used in the creation of this document   **

## 2020-08-17 NOTE — ASSESSMENT & PLAN NOTE
· Patient is complaining of severe back pain mainly in the lower part of the back and also in the sacral region  · Refuses examination due to pain  · Patient had MRI of the lumbar and thoracic spine during the recent hospital stay in [facility] which was unremarkable  But patient reports that her pain is much worse and she is screaming out saying that she is in severe pain and oxycodone is not helping her at all  Was changed to p o  Dilaudid over night     · Myself and acute pain at bedside discussed plan and pt seems to be calming down , adjustments made per acute pain recs   · - added robaxin 500 mg q 6  · - added Toradol 30 mg q 6 hrs per acute pain for 5 days monitor renal function   · - increased oxy to 10 mg and 15 mg   · - added iv morphine 4 mg q2 hrs prn for breakthrough  · -continue tylenol atc pt can refuse (she states it makes her break out in profuse sweats)   · - continue lidocaine patch  · Patient with history of intravenous drug abuse

## 2020-08-17 NOTE — ASSESSMENT & PLAN NOTE
· 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  Initially patient was admitted to [facility] from 07/15-blood culture were positive for MSSA bacteremia, but patient signed AMA on 07/22  Her repeat blood culture done on 7/21 was negative after 5 days  · Patient was admitted to 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 during the fast hospital stay from 7/15-7/22  · Continue with the antibiotics per Infectious Disease  · Repeat blood culture (pt has been refusing sticks extensive discussion had with the pt and the nursing staff to have the best person obtaining her blood)   · tmax 101  · CT surgery appreciated  · Patient also with previous history of MSSA bacteremia /possible endocarditis in 2018-    8/14-patient had 1 blood culture drawn yesterday; tentatively positive for staph, awaiting speciation is  Continue current treatment plan  - remains afebrile although tachycardic; will discuss with patient alone further cultures to be drawn tomorrow    8/15 - No vascular access prevented repeat draws this AM; will attempt again once vascular access is obtained    8/16-PICC line inserted yesterday; blood cultures x1 obtained and pending  As per ID, continue current antibiotics, some concern however given her fever overnight increasing white count and tachycardia; will defer to ID for possible adjustments in antibiotic therapy  8/17-1/1 bottle blood cultures negative times 24 hours  Will eventually need PICC line replaced in 24-48 hours    Case management report HOST consult pending; total of 6 week antibiotics needed clear bacteremia

## 2020-08-17 NOTE — PROGRESS NOTES
Progress Note - Infectious Disease   Estle Nine 34 y o  female MRN: [id number]  Unit/Bed#: [facility] 425-01 Encounter: [id number]      Impression/Plan:  1  Recurrent/persistent MSSA bacteremia with TV infective endocarditis   Blood cultures from 8/10 remain positive   Prolonged course of IV antibiotic was previously recommended, but patient has left AMA on 2 prior occasions (once at [facility] and once from [facility])    She remains hemodynamically stable despite her ongoing bacteremia  Follow-up blood culture x1 set is negative times 24 hours     -continue cefazolin for now at current dose  -follow up repeat blood cultures  -if bacteremia persists, will change to nafcillin  -recheck CBC with diff and CMP  -once patient clears bacteremia, will need replacement of PICC line     2  Tricuspid valve endocarditis, 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  7/21 MELISSA showed large vegetation on TV with severe regurgitation  7/15 CT abdomen/pelvis also showed bilateral renal parenchymal abnormalities concerning for septic emboli   No intra-abdominal abscess     -antibiotic plan as above  -CT surgery is following      3  Right iliacus muscle abscesses-very small and unlikely to be able to be drained  At the level of the mid SI joint but the SI joint appears okay   -antibiotics as above  -pain management     4  Recent left foot cellulitis with abscess   Likely site of injection of IV drugs versus ectopic foci in the setting of MSSA bacteremia   This appears to have healed with no evidence of active infection   -antibiotic plan as above      5  Back pain   More likely secondary to chronic pain, opioid dependence   8/1/20 Thoracic and lumbar spine MRIs performed at [facility] [place] negative were for abscess     -monitor back pain   -serial exams  -acute pain service follow-up     6   Active IV heroin abuse   This is the causative factor for development of bacteremia and infective endocarditis   Patient has left AMA on prior occasions   HIV screen negative  -patient is not a candidate for at home PICC line/IV antibiotics  -host evaluation if patient will agree  -acute pain service follow-up     7  Chronic HCV infection, transaminitis   Recent HIV was negative  The LFTs are waxing waning  -monitor LFTs     Discussed the above management plan with the primary service    Antibiotics:  Cefazolin restarted 8    Subjective:  Patient has no fever, chills, sweats; no nausea, vomiting, diarrhea; no cough, shortness of breath; continues to have pain  No new symptoms  She remains hemodynamically stable    Objective:  Vitals:  Temp:  [97 8 °F (36 6 °C)-99 °F (37 2 °C)] 98 2 °F (36 8 °C)  HR:  [] 107  Resp:  [18-19] 19  BP: (112-128)/(64-78) 117/77  SpO2:  [94 %-97 %] 95 %  Temp (24hrs), Av 3 °F (36 8 °C), Min:97 8 °F (36 6 °C), Max:99 °F (37 2 °C)  Current: Temperature: 98 2 °F (36 8 °C)    Physical Exam:   General Appearance:  Alert, interactive, nontoxic, no acute distress  Throat: Oropharynx moist without lesions  Lungs:   Clear to auscultation bilaterally; no wheezes, rhonchi or rales; respirations unlabored   Heart:  Tachycardic; no murmur, rub or gallop   Abdomen:   Soft, non-tender, non-distended, positive bowel sounds  Extremities: No clubbing, cyanosis or edema   Skin: No new rashes or lesions  No draining wounds noted         Labs, Imaging, & Other studies:   All pertinent labs and imaging studies were personally reviewed  Results from last 7 days   Lab Units 20  0556 08/15/20  1544   WBC Thousand/uL 10 22* 11 66* 10 92*   HEMOGLOBIN g/dL 7 4* 7 8* 7 3*   PLATELETS Thousands/uL 368 393* 359     Results from last 7 days   Lab Units 20  0556 08/15/20  1544 20  0943   SODIUM mmol/L 138 135* 136 138   POTASSIUM mmol/L 4 5 4 5 4 2 4 2   CHLORIDE mmol/L 105 103 105 106   CO2 mmol/L 26 24 26 26   BUN mg/dL 11 11 9 10   CREATININE mg/dL 0 53* 0 50* 0 47* 0 55*   EGFR ml/min/1 73sq m 131 133 136 129   CALCIUM mg/dL 8 3 8 5 8 0* 7 8*   AST U/L  --  198*  --  45   ALT U/L  --  60  --  18   ALK PHOS U/L  --  139*  --  141*     Results from last 7 days   Lab Units 08/15/20  1547 08/13/20  0446   BLOOD CULTURE  No Growth at 24 hrs   Staphylococcus aureus*   GRAM STAIN RESULT   --  Gram positive cocci in clusters*

## 2020-08-17 NOTE — ASSESSMENT & PLAN NOTE
· Patient with acute drug abuse and likely the source of bacteremia  · Patient has a history of signing against medical advise  · Pain control  · May benefit from drug rehab eventually  · During the hospital stay in July of this year patient was positive for [facility] ,cocaine and opiates  · Still pending urine drug screen   · Pt is refusing to urinate per nursing at this time I feel she will void when needed  She is demanding to have a purewick attached as she states she refuses to [employer]"  At this time we will work toward pain control

## 2020-08-17 NOTE — PROGRESS NOTES
Progress Note - Acute Pain Service    Debbie Crenshaw 34 y o  female MRN: [id number]  Unit/Bed#: [facility] 425-01 Encounter: [id number]      Assessment:   Principal Problem:    Bacteremia due to Staphylococcus aureus  Active Problems:    Acute bacterial endocarditis    Septic embolism (HCC)    Bipolar 1 disorder (HCC)    Back pain    Intravenous drug abuse ([facility] [address] )      Plan:   · Would avoid scheduled acetaminophen secondary to mild transaminitis  · Continue oxycodone 10 mg p o  q 4 hours p r n  moderate pain  · Continue oxycodone 15 mg p o  q 4 hours p r n  severe pain  · Continue morphine 4 mg IV q 2 hours p r n  breakthrough pain  · Will continue ketamine infusion at 0 2 mg/kg/hr  · Continue gabapentin 100 mg p o  t i d  scheduled  · Continue methocarbamol 500 mg p o  q 6 hours scheduled  · Continue lidocaine 5% patch, 2 patches to lower back for 12 hours daily  · Continue bowel regimen to avoid opioid induced constipation  · Patient received PICC line over the weekend and, with consistent infusion of ketamine, her pain and anxiety have improved  APS will continue to follow; please contact APS ( [phone]) with any further questions    Pain History  Current pain location(s):  Right hip  Pain Scale:   10/10  Quality:  Sharp  24 hour history:  Patient received a PICC line over the weekend and since that time has had a consistent infusion of ketamine  She states her pain is improving slowly but still significant  States that the oral pain medications are relatively ineffective, however IV morphine does take the edge off  Pain still increases significantly with movement  Opioid requirement previous 24 hours:  Oxycodone 75 mg p o , morphine 20 mg IV       Meds/Allergies   all current active meds have been reviewed, current meds:   Current Facility-Administered Medications   Medication Dose Route Frequency    calcium carbonate (TUMS) chewable tablet 1,000 mg  1,000 mg Oral Daily PRN    ceFAZolin (ANCEF) IVPB (premix) 2,000 mg 50 mL  2,000 mg Intravenous Q8H    enoxaparin (LOVENOX) subcutaneous injection 40 mg  40 mg Subcutaneous Q24H JEFF    gabapentin (NEURONTIN) capsule 100 mg  100 mg Oral TID    glycopyrrolate (ROBINUL) injection 0 2 mg  0 2 mg Intravenous Q4H PRN    haloperidol lactate (HALDOL) injection 2 mg  2 mg Intramuscular Q30 Min PRN    ketamine 250 mg (STANDARD CONCENTRATION) IV in sodium chloride 0 9% 250 mL  0 2 mg/kg/hr Intravenous Continuous    ketorolac (TORADOL) injection 30 mg  30 mg Intravenous Q6H [address]    lidocaine (LIDODERM) 5 % patch 2 patch  2 patch Topical Daily    LORazepam (ATIVAN) injection 1 mg  1 mg Intravenous Q1H PRN    magnesium citrate (CITROMA) oral solution 296 mL  296 mL Oral Daily PRN    methocarbamol (ROBAXIN) tablet 500 mg  500 mg Oral Q6H [address]    mirtazapine (REMERON) tablet 30 mg  30 mg Oral HS    morphine (PF) 4 mg/mL injection 4 mg  4 mg Intravenous Q2H PRN    ondansetron (ZOFRAN) injection 4 mg  4 mg Intravenous Q6H PRN    oxyCODONE (ROXICODONE) immediate release tablet 10 mg  10 mg Oral Q4H PRN    oxyCODONE (ROXICODONE) IR tablet 15 mg  15 mg Oral Q4H PRN    saccharomyces boulardii (FLORASTOR) capsule 250 mg  250 mg Oral BID    senna (SENOKOT) tablet 8 6 mg  1 tablet Oral BID    and PTA meds:   None       Allergies   Allergen Reactions    Cat Hair Extract     Dog Epithelium     Latex     Pollen Extract        Objective     Temp:  [97 8 °F (36 6 °C)-99 °F (37 2 °C)] 98 5 °F (36 9 °C)  HR:  [] 102  Resp:  [16-19] 16  BP: (112-128)/(64-78) 127/78    Physical Exam  Vitals signs and nursing note reviewed  Constitutional:       General: She is awake  She is not in acute distress  Eyes:      Pupils: Pupils are equal, round, and reactive to light  Cardiovascular:      Rate and Rhythm: Normal rate and regular rhythm  Skin:     General: Skin is warm and dry  Neurological:      General: No focal deficit present        Mental Status: She is alert and oriented to person, place, and time  GCS: GCS eye subscore is 4  GCS verbal subscore is 5  GCS motor subscore is 6  Psychiatric:         Behavior: Behavior is cooperative  Lab Results:   Results from last 7 days   Lab Units 08/17/20  0427   WBC Thousand/uL 10 22*   HEMOGLOBIN g/dL 7 4*   HEMATOCRIT % 23 8*   PLATELETS Thousands/uL 368      Results from last 7 days   Lab Units 08/17/20 0427 08/16/20  0556   POTASSIUM mmol/L 4 5 4 5   CHLORIDE mmol/L 105 103   CO2 mmol/L 26 24   BUN mg/dL 11 11   CREATININE mg/dL 0 53* 0 50*   CALCIUM mg/dL 8 3 8 5   ALK PHOS U/L  --  139*   ALT U/L  --  60   AST U/L  --  198*       Imaging Studies: I have personally reviewed pertinent reports  EKG, Pathology, and Other Studies: I have personally reviewed pertinent reports  Counseling / Coordination of Care  Total floor / unit time spent today 20 minutes  Greater than 50% of total time was spent with the patient and / or family counseling and / or coordination of care  A description of the counseling / coordination of care:  Patient interview, physical examination, review of medical record, review of imaging and laboratory data, development of pain management plan, discussion of pain management plan with patient and primary service      Lyric Lemus PA-C

## 2020-08-17 NOTE — PROGRESS NOTES
Dr Reno Negron from the pain mgt team   Pain reported to be tolerating and feeling better with the current regimen  Was informed that patient needs to be on telemetry while on the ketamine gtt  Telemetry replaced

## 2020-08-18 LAB
ALBUMIN SERPL BCP-MCNC: 1.9 G/DL (ref 3.5–5)
ALP SERPL-CCNC: 145 U/L (ref 46–116)
ALT SERPL W P-5'-P-CCNC: 53 U/L (ref 12–78)
ANION GAP SERPL CALCULATED.3IONS-SCNC: 6 MMOL/L (ref 4–13)
AST SERPL W P-5'-P-CCNC: 118 U/L (ref 5–45)
BILIRUB SERPL-MCNC: 0.32 MG/DL (ref 0.2–1)
BUN SERPL-MCNC: 14 MG/DL (ref 5–25)
CALCIUM SERPL-MCNC: 8.1 MG/DL (ref 8.3–10.1)
CHLORIDE SERPL-SCNC: 105 MMOL/L (ref 100–108)
CO2 SERPL-SCNC: 26 MMOL/L (ref 21–32)
CREAT SERPL-MCNC: 0.59 MG/DL (ref 0.6–1.3)
ERYTHROCYTE [DISTWIDTH] IN BLOOD BY AUTOMATED COUNT: 14.8 % (ref 11.6–15.1)
GFR SERPL CREATININE-BSD FRML MDRD: 126 ML/MIN/1.73SQ M
GLUCOSE SERPL-MCNC: 97 MG/DL (ref 65–140)
HCT VFR BLD AUTO: 23.3 % (ref 34.8–46.1)
HGB BLD-MCNC: 7.3 G/DL (ref 11.5–15.4)
MAGNESIUM SERPL-MCNC: 2.1 MG/DL (ref 1.6–2.6)
MCH RBC QN AUTO: 26.7 PG (ref 26.8–34.3)
MCHC RBC AUTO-ENTMCNC: 31.3 G/DL (ref 31.4–37.4)
MCV RBC AUTO: 85 FL (ref 82–98)
NRBC BLD AUTO-RTO: 0 /100 WBCS
PHOSPHATE SERPL-MCNC: 5 MG/DL (ref 2.7–4.5)
PLATELET # BLD AUTO: 350 THOUSANDS/UL (ref 149–390)
PMV BLD AUTO: 8.8 FL (ref 8.9–12.7)
POTASSIUM SERPL-SCNC: 4.1 MMOL/L (ref 3.5–5.3)
PROT SERPL-MCNC: 7.4 G/DL (ref 6.4–8.2)
RBC # BLD AUTO: 2.73 MILLION/UL (ref 3.81–5.12)
SODIUM SERPL-SCNC: 137 MMOL/L (ref 136–145)
WBC # BLD AUTO: 8.5 THOUSAND/UL (ref 4.31–10.16)

## 2020-08-18 PROCEDURE — 99232 SBSQ HOSP IP/OBS MODERATE 35: CPT | Performed by: PHYSICIAN ASSISTANT

## 2020-08-18 PROCEDURE — 80053 COMPREHEN METABOLIC PANEL: CPT | Performed by: INTERNAL MEDICINE

## 2020-08-18 PROCEDURE — 84100 ASSAY OF PHOSPHORUS: CPT | Performed by: INTERNAL MEDICINE

## 2020-08-18 PROCEDURE — 85027 COMPLETE CBC AUTOMATED: CPT | Performed by: INTERNAL MEDICINE

## 2020-08-18 PROCEDURE — 99232 SBSQ HOSP IP/OBS MODERATE 35: CPT | Performed by: INTERNAL MEDICINE

## 2020-08-18 PROCEDURE — 83735 ASSAY OF MAGNESIUM: CPT | Performed by: INTERNAL MEDICINE

## 2020-08-18 RX ADMIN — LORAZEPAM 1 MG: 2 INJECTION INTRAMUSCULAR; INTRAVENOUS at 19:35

## 2020-08-18 RX ADMIN — KETOROLAC TROMETHAMINE 30 MG: 30 INJECTION, SOLUTION INTRAMUSCULAR at 05:28

## 2020-08-18 RX ADMIN — GABAPENTIN 100 MG: 100 CAPSULE ORAL at 15:02

## 2020-08-18 RX ADMIN — METHOCARBAMOL 500 MG: 500 TABLET, FILM COATED ORAL at 23:17

## 2020-08-18 RX ADMIN — LORAZEPAM 1 MG: 2 INJECTION INTRAMUSCULAR; INTRAVENOUS at 23:17

## 2020-08-18 RX ADMIN — MORPHINE SULFATE 4 MG: 4 INJECTION INTRAVENOUS at 04:35

## 2020-08-18 RX ADMIN — LORAZEPAM 1 MG: 2 INJECTION INTRAMUSCULAR; INTRAVENOUS at 15:39

## 2020-08-18 RX ADMIN — OXYCODONE HYDROCHLORIDE 15 MG: 10 TABLET ORAL at 02:45

## 2020-08-18 RX ADMIN — OXYCODONE HYDROCHLORIDE 15 MG: 10 TABLET ORAL at 15:39

## 2020-08-18 RX ADMIN — MORPHINE SULFATE 4 MG: 4 INJECTION INTRAVENOUS at 21:03

## 2020-08-18 RX ADMIN — LIDOCAINE 5% 2 PATCH: 700 PATCH TOPICAL at 06:12

## 2020-08-18 RX ADMIN — METHOCARBAMOL 500 MG: 500 TABLET, FILM COATED ORAL at 17:00

## 2020-08-18 RX ADMIN — MIRTAZAPINE 30 MG: 30 TABLET, FILM COATED ORAL at 21:04

## 2020-08-18 RX ADMIN — ENOXAPARIN SODIUM 40 MG: 40 INJECTION SUBCUTANEOUS at 08:20

## 2020-08-18 RX ADMIN — OXYCODONE HYDROCHLORIDE 15 MG: 10 TABLET ORAL at 19:35

## 2020-08-18 RX ADMIN — LORAZEPAM 1 MG: 2 INJECTION INTRAMUSCULAR; INTRAVENOUS at 18:08

## 2020-08-18 RX ADMIN — MORPHINE SULFATE 4 MG: 4 INJECTION INTRAVENOUS at 16:54

## 2020-08-18 RX ADMIN — LORAZEPAM 1 MG: 2 INJECTION INTRAMUSCULAR; INTRAVENOUS at 02:45

## 2020-08-18 RX ADMIN — CEFAZOLIN SODIUM 2000 MG: 2 SOLUTION INTRAVENOUS at 06:16

## 2020-08-18 RX ADMIN — MORPHINE SULFATE 4 MG: 4 INJECTION INTRAVENOUS at 08:13

## 2020-08-18 RX ADMIN — MORPHINE SULFATE 4 MG: 4 INJECTION INTRAVENOUS at 13:23

## 2020-08-18 RX ADMIN — GABAPENTIN 100 MG: 100 CAPSULE ORAL at 21:04

## 2020-08-18 RX ADMIN — METHOCARBAMOL 500 MG: 500 TABLET, FILM COATED ORAL at 05:28

## 2020-08-18 RX ADMIN — Medication 250 MG: at 08:19

## 2020-08-18 RX ADMIN — METHOCARBAMOL 500 MG: 500 TABLET, FILM COATED ORAL at 11:40

## 2020-08-18 RX ADMIN — LORAZEPAM 1 MG: 2 INJECTION INTRAMUSCULAR; INTRAVENOUS at 10:03

## 2020-08-18 RX ADMIN — GABAPENTIN 100 MG: 100 CAPSULE ORAL at 08:19

## 2020-08-18 RX ADMIN — LORAZEPAM 1 MG: 2 INJECTION INTRAMUSCULAR; INTRAVENOUS at 05:28

## 2020-08-18 RX ADMIN — OXYCODONE HYDROCHLORIDE 15 MG: 10 TABLET ORAL at 10:02

## 2020-08-18 RX ADMIN — OXYCODONE HYDROCHLORIDE 15 MG: 10 TABLET ORAL at 23:17

## 2020-08-18 RX ADMIN — Medication 250 MG: at 17:00

## 2020-08-18 RX ADMIN — Medication 0.2 MG/KG/HR: at 07:14

## 2020-08-18 RX ADMIN — CEFAZOLIN SODIUM 2000 MG: 2 SOLUTION INTRAVENOUS at 23:17

## 2020-08-18 RX ADMIN — CEFAZOLIN SODIUM 2000 MG: 2 SOLUTION INTRAVENOUS at 14:50

## 2020-08-18 NOTE — ASSESSMENT & PLAN NOTE
· Patient noted to have tricuspid while vegetation on echocardiogram done in July  Patient had a transthoracic and also transesophageal echocardiogram done during the last hospital stay  · Recent CT of the abdomen and pelvis and also CT chest shows abnormalities concerning for septic emboli  · Antibiotics per Infectious Disease  · Patient is transferred over here to be evaluated by CT surgery  · - at the time of their evaluation pt is bilgerant and uncooperative reportedly due to pain  · - she was noncommittal to abstain from illegal drugs or unintended use of medications  · - recommendations to continue iv abx use   · - when she continues to abstain and completes iv abx treatment they would consider surgical correction   · - would benefit from neuropsych and psychiatry input    8/15-repeat labs; awaiting repeat blood culture  Continue antibiotics as per ID

## 2020-08-18 NOTE — PROGRESS NOTES
Progress Note - Infectious Disease   Sajan Love 34 y o  female MRN: [id number]  Unit/Bed#: [facility] 425-01 Encounter: [id number]      Impression/Plan:  1  Recurrent/persistent MSSA bacteremia with TV infective endocarditis   Blood cultures from 8/10 remain positive   Prolonged course of IV antibiotic was previously recommended, but patient has left AMA on 2 prior occasions (once at [facility] and once from [facility])    She remains hemodynamically stable despite her ongoing bacteremia  repeat blood cultures negative thus far  -continue cefazolin for now at current dose  -recheck blood cultures x2 sets tomorrow a m   -follow up repeat blood cultures  -if bacteremia persists, will change to nafcillin  -recheck CBC with diff and CMP  -once patient clears bacteremia, will need replacement of PICC line     2  Tricuspid valve endocarditis, 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  7/21 MELISSA showed large vegetation on TV with severe regurgitation  7/15 CT abdomen/pelvis also showed bilateral renal parenchymal abnormalities concerning for septic emboli   No intra-abdominal abscess     -antibiotic plan as above  -CT surgery is following      3  Right iliacus muscle abscesses-very small and unlikely to be able to be drained   At the level of the mid SI joint but the SI joint appears okay   -antibiotics as above  -pain management     4  Recent left foot cellulitis with abscess   Likely site of injection of IV drugs versus ectopic foci in the setting of MSSA bacteremia   This appears to have healed with no evidence of active infection   -antibiotic plan as above      5  Back pain   More likely secondary to chronic pain, opioid dependence   8/1/20 Thoracic and lumbar spine MRIs performed at [facility] [place] negative were for abscess or osteomyelitis     -monitor back pain   -serial exams  -acute pain service follow-up     6   Active IV heroin abuse   This is the causative factor for development of bacteremia and infective endocarditis   Patient has left AMA on prior occasions   HIV screen negative  -patient is not a candidate for at home PICC line/IV antibiotics  -host evaluation if patient will agree  -acute pain service follow-up     7  Chronic HCV infection, transaminitis   Recent HIV was negative   The LFTs are waxing waning  -monitor LFTs     Antibiotics:  Cefazolin restarted 9    Subjective:  Patient has no fever, chills, sweats; no nausea, vomiting, diarrhea; no cough, shortness of breath; no increase pain  No new symptoms  Objective:  Vitals:  Temp:  [97 5 °F (36 4 °C)-98 5 °F (36 9 °C)] 98 2 °F (36 8 °C)  HR:  [100-125] 102  Resp:  [16-19] 18  BP: (122-135)/(67-80) 135/80  SpO2:  [95 %-97 %] 95 %  Temp (24hrs), Av °F (36 7 °C), Min:97 5 °F (36 4 °C), Max:98 5 °F (36 9 °C)  Current: Temperature: 98 2 °F (36 8 °C)    Physical Exam:   General Appearance:  Alert, interactive, nontoxic, no acute distress  Throat: Oropharynx moist without lesions  Lungs:   Clear to auscultation bilaterally; no wheezes, rhonchi or rales; respirations unlabored   Heart:  Tachycardic; no murmur, rub or gallop   Abdomen:   Soft, non-tender, non-distended, positive bowel sounds  Extremities: No clubbing, cyanosis or edema   Skin: No new rashes or lesions  No draining wounds noted         Labs, Imaging, & Other studies:   All pertinent labs and imaging studies were personally reviewed  Results from last 7 days   Lab Units 20  0529 20  0427 20  0556   WBC Thousand/uL 8 50 10 22* 11 66*   HEMOGLOBIN g/dL 7 3* 7 4* 7 8*   PLATELETS Thousands/uL 350 368 393*     Results from last 7 days   Lab Units 20  0600 20  0427 20  0556  20  0943   SODIUM mmol/L 137 138 135*   < > 138   POTASSIUM mmol/L 4 1 4 5 4 5   < > 4 2   CHLORIDE mmol/L 105 105 103   < > 106   CO2 mmol/L 26 26 24   < > 26   BUN mg/dL 14 11 11   < > 10   CREATININE mg/dL 0 59* 0 53* 0 50*   < > 0 55*   EGFR ml/min/1 73sq m 126 131 133   < > 129   CALCIUM mg/dL 8 1* 8 3 8 5   < > 7 8*   AST U/L 118*  --  198*  --  45   ALT U/L 53  --  60  --  18   ALK PHOS U/L 145*  --  139*  --  141*    < > = values in this interval not displayed  Results from last 7 days   Lab Units 08/15/20  1547 08/13/20  0446   BLOOD CULTURE  No Growth at 48 hrs   Staphylococcus aureus*   GRAM STAIN RESULT   --  Gram positive cocci in clusters*

## 2020-08-18 NOTE — ASSESSMENT & PLAN NOTE
· Patient noted to have abnormalities seen in the CT of the chest abdomen and pelvis concerning for septic emboli  · There is pleural effusion on the CT scan of the chest and also on repeat chest x-ray  · Patient is rather asymptomatic from pulmonary standpoint  · Continue with the antibiotics  · Thoracic surgery have evaluated the pt and she is not short of breath     8/14-patient complaining of acute right hip pain; concerning for possible embolism  Consider imaging and will discuss with acute pain service  8/15-imaging right hip shows no osseous involvement; concern for proximity of abscess status SI joint, but no SI joint involvement at this time  Continue antibiotics monitor for improvement

## 2020-08-18 NOTE — PROGRESS NOTES
Progress Note - Acute Pain Service    Jennifer Salazar 34 y o  female MRN: [id number]  Unit/Bed#: [facility] 425-01 Encounter: [id number]      Assessment:   Principal Problem:    Bacteremia due to Staphylococcus aureus  Active Problems:    Acute bacterial endocarditis    Septic embolism (HCC)    Bipolar 1 disorder (HCC)    Back pain    Intravenous drug abuse ([facility] [address] )      Plan:   · Continue oxycodone 10 mg p o  q 4 hours p r n  moderate pain  · Continue oxycodone 15 mg p o  q 4 hours p r n  severe pain  · Continue morphine 4 mg IV q 2 hours p r n  breakthrough pain  · Continue ketamine at 0 2 mg/kg/hr  · Continue gabapentin 100 mg p o  t i d  scheduled, consider increasing dose tomorrow  · Continue methocarbamol 500 mg p o  q 6 hours scheduled  · Continue lidocaine 5% patch, 2 patches for 12 hours daily  · Continue bowel regimen to avoid opioid induced constipation  APS will continue to follow; please contact APS ( [phone]) with any further questions    Pain History  Current pain location(s):  Right hip  Pain Scale:   10/10  Quality:  Sharp  24 hour history:  Patient continues to complain of 10/10 pain, however also states that her pain is much better than when she arrived at the hospital and has slowly improved from yesterday  She is more mobile in bed and moving the right leg quite frequently during our conversation  States that she is comfortable with the current pain regimen  Patient has been generally more cooperative with following recommendations of the team   Denies nausea, vomiting, diarrhea, constipation, numbness, weakness, itching      Opioid requirement previous 24 hours:  Morphine 20 mg IV , oxycodone 75 mg p o     Meds/Allergies   all current active meds have been reviewed, current meds:   Current Facility-Administered Medications   Medication Dose Route Frequency    calcium carbonate (TUMS) chewable tablet 1,000 mg  1,000 mg Oral Daily PRN    ceFAZolin (ANCEF) IVPB (premix) 2,000 mg 50 mL  2,000 mg Intravenous Q8H    enoxaparin (LOVENOX) subcutaneous injection 40 mg  40 mg Subcutaneous Q24H JEFF    gabapentin (NEURONTIN) capsule 100 mg  100 mg Oral TID    glycopyrrolate (ROBINUL) injection 0 2 mg  0 2 mg Intravenous Q4H PRN    haloperidol lactate (HALDOL) injection 2 mg  2 mg Intramuscular Q30 Min PRN    ketamine 250 mg (STANDARD CONCENTRATION) IV in sodium chloride 0 9% 250 mL  0 2 mg/kg/hr Intravenous Continuous    lidocaine (LIDODERM) 5 % patch 2 patch  2 patch Topical Daily    LORazepam (ATIVAN) injection 1 mg  1 mg Intravenous Q1H PRN    magnesium citrate (CITROMA) oral solution 296 mL  296 mL Oral Daily PRN    methocarbamol (ROBAXIN) tablet 500 mg  500 mg Oral Q6H [address]    mirtazapine (REMERON) tablet 30 mg  30 mg Oral HS    morphine (PF) 4 mg/mL injection 4 mg  4 mg Intravenous Q2H PRN    ondansetron (ZOFRAN) injection 4 mg  4 mg Intravenous Q6H PRN    oxyCODONE (ROXICODONE) immediate release tablet 10 mg  10 mg Oral Q4H PRN    oxyCODONE (ROXICODONE) IR tablet 15 mg  15 mg Oral Q4H PRN    saccharomyces boulardii (FLORASTOR) capsule 250 mg  250 mg Oral BID    senna (SENOKOT) tablet 8 6 mg  1 tablet Oral BID    and PTA meds:   None       Allergies   Allergen Reactions    Cat Hair Extract     Dog Epithelium     Latex     Pollen Extract        Objective     Temp:  [97 5 °F (36 4 °C)-98 2 °F (36 8 °C)] 98 2 °F (36 8 °C)  HR:  [100-125] 102  Resp:  [17-19] 18  BP: (122-135)/(67-80) 135/78    Physical Exam  Vitals signs and nursing note reviewed  Constitutional:       General: She is awake  She is not in acute distress  Eyes:      Pupils: Pupils are equal, round, and reactive to light  Cardiovascular:      Rate and Rhythm: Normal rate and regular rhythm  Musculoskeletal:        Legs:    Skin:     General: Skin is warm and dry  Neurological:      General: No focal deficit present  Mental Status: She is alert  GCS: GCS eye subscore is 4   GCS verbal subscore is 5  GCS motor subscore is 6  Sensory: Sensation is intact  Psychiatric:         Behavior: Behavior is cooperative  Lab Results:   Results from last 7 days   Lab Units 08/18/20  0529   WBC Thousand/uL 8 50   HEMOGLOBIN g/dL 7 3*   HEMATOCRIT % 23 3*   PLATELETS Thousands/uL 350      Results from last 7 days   Lab Units 08/18/20  0600   POTASSIUM mmol/L 4 1   CHLORIDE mmol/L 105   CO2 mmol/L 26   BUN mg/dL 14   CREATININE mg/dL 0 59*   CALCIUM mg/dL 8 1*   ALK PHOS U/L 145*   ALT U/L 53   AST U/L 118*       Imaging Studies: I have personally reviewed pertinent reports  EKG, Pathology, and Other Studies: I have personally reviewed pertinent reports  Counseling / Coordination of Care  Total floor / unit time spent today 15 minutes  Greater than 50% of total time was spent with the patient and / or family counseling and / or coordination of care  A description of the counseling / coordination of care:  Patient interview, physical examination, review of medical record, review of imaging and laboratory data, development of pain management plan, discussion of pain management plan with patient and primary service      Osvaldo Brown PA-C

## 2020-08-18 NOTE — ASSESSMENT & PLAN NOTE
· Patient is complaining of severe back pain mainly in the lower part of the back and also in the sacral region  · Refuses examination due to pain  · Patient had MRI of the lumbar and thoracic spine during the recent hospital stay in [facility] which was unremarkable  But patient reports that her pain is much worse and she is screaming out saying that she is in severe pain and oxycodone is not helping her at all  Was changed to p o  Dilaudid over night     · - continue robaxin 500 mg q 6  · - continue Toradol 30 mg q 6 hrs per acute pain for 5 days monitor renal function   · - continue oxy to 10 mg and 15 mg   · - continue iv morphine 4 mg q2 hrs prn for breakthrough  · -continue tylenol atc pt can refuse (she states it makes her break out in profuse sweats)   · - continue lidocaine patch  · Patient with history of intravenous drug abuse  · Iliacus muscle abscess is noted on CT scan, continue antibiotic treatment as above, no indication for drainage at this time

## 2020-08-18 NOTE — ASSESSMENT & PLAN NOTE
· Patient with acute drug abuse and likely the source of bacteremia    · Patient has a history of signing against medical advise  · May benefit from drug rehab eventually  · During the hospital stay in July of this year patient was positive for [facility] ,cocaine and opiates  · Still pending urine drug screen

## 2020-08-18 NOTE — ASSESSMENT & PLAN NOTE
· 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  Initially patient was admitted to [address] [place] [address] from 07/15-blood culture were positive for MSSA bacteremia, but patient signed AMA on 07/22  Her repeat blood culture done on 7/21 was negative after 5 days  · Patient was admitted to [facility] on 07/30-blood culture came back positive for MSSA and she left again is medical advice on 08/04  Her blood cultures came back positive on 07/30, but the bacteremia cleared as of 8 /2  Patient left AMA on 08/04  · Patient was readmitted to [address] [place] [address] on 08/10-blood culture came back positive for Staph aureus  Currently on cefazolin per Infectious Disease  · 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 during the fast hospital stay from 7/15-7/22  · Continue with the antibiotics per Infectious Disease  · Repeat blood culture (pt has been refusing sticks extensive discussion had with the pt and the nursing staff to have the best person obtaining her blood)   · tmax 101  · CT surgery appreciated  · Patient also with previous history of MSSA bacteremia /possible endocarditis in 2018-    8/14-patient had 1 blood culture drawn yesterday; tentatively positive for staph, awaiting speciation is  Continue current treatment plan  - remains afebrile although tachycardic; will discuss with patient alone further cultures to be drawn tomorrow    8/15 - No vascular access prevented repeat draws this AM; will attempt again once vascular access is obtained    8/16-PICC line inserted yesterday; blood cultures x1 obtained and pending  As per ID, continue current antibiotics, some concern however given her fever overnight increasing white count and tachycardia; will defer to ID for possible adjustments in antibiotic therapy  8/17-1/1 bottle blood cultures negative times 24 hours  Will eventually need PICC line replaced in 24-48 hours    Case management report HOST consult pending; total of 6 week antibiotics needed clear bacteremia  8/18:  Blood cultures from 08/13 positive, blood cultures from 08/15 with no growth  Possibly replace PICC line tomorrow

## 2020-08-18 NOTE — PROGRESS NOTES
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  Initially patient was admitted to [facility] from 07/15-blood culture were positive for MSSA bacteremia, but patient signed AMA on 07/22  Her repeat blood culture done on 7/21 was negative after 5 days  · Patient was admitted to [facility] on 07/30-blood culture came back positive for MSSA and she left again is medical advice on 08/04  Her blood cultures came back positive on 07/30, but the bacteremia cleared as of 8 /2  Patient left AMA on 08/04  · Patient was readmitted to [address] [place] [address] on 08/10-blood culture came back positive for Staph aureus  Currently on cefazolin per Infectious Disease  · 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 during the fast hospital stay from 7/15-7/22  · Continue with the antibiotics per Infectious Disease  · Repeat blood culture (pt has been refusing sticks extensive discussion had with the pt and the nursing staff to have the best person obtaining her blood)   · tmax 101  · CT surgery appreciated  · Patient also with previous history of MSSA bacteremia /possible endocarditis in 2018-    8/14-patient had 1 blood culture drawn yesterday; tentatively positive for staph, awaiting speciation is  Continue current treatment plan  - remains afebrile although tachycardic; will discuss with patient alone further cultures to be drawn tomorrow    8/15 - No vascular access prevented repeat draws this AM; will attempt again once vascular access is obtained    8/16-PICC line inserted yesterday; blood cultures x1 obtained and pending    As per ID, continue current antibiotics, some concern however given her fever overnight increasing white count and tachycardia; will defer to ID for possible adjustments in antibiotic therapy  8/17-1/1 bottle blood cultures negative times 24 hours  Will eventually need PICC line replaced in 24-48 hours  Case management report HOST consult pending; total of 6 week antibiotics needed clear bacteremia  8/18:  Blood cultures from 08/13 positive, blood cultures from 08/15 with no growth  Possibly replace PICC line tomorrow  Acute bacterial endocarditis  Assessment & Plan  · Patient noted to have tricuspid while vegetation on echocardiogram done in July  Patient had a transthoracic and also transesophageal echocardiogram done during the last hospital stay  · Recent CT of the abdomen and pelvis and also CT chest shows abnormalities concerning for septic emboli  · Antibiotics per Infectious Disease  · Patient is transferred over here to be evaluated by CT surgery  · - at the time of their evaluation pt is bilgerant and uncooperative reportedly due to pain  · - she was noncommittal to abstain from illegal drugs or unintended use of medications  · - recommendations to continue iv abx use   · - when she continues to abstain and completes iv abx treatment they would consider surgical correction   · - would benefit from neuropsych and psychiatry input    8/15-repeat labs; awaiting repeat blood culture  Continue antibiotics as per ID  Intravenous drug abuse [facility])  Assessment & Plan  · Patient with acute drug abuse and likely the source of bacteremia    · Patient has a history of signing against medical advise  · May benefit from drug rehab eventually  · During the hospital stay in July of this year patient was positive for THC ,cocaine and opiates  · Still pending urine drug screen     Back pain  Assessment & Plan  · Patient is complaining of severe back pain mainly in the lower part of the back and also in the sacral region  · Refuses examination due to pain  · Patient had MRI of the lumbar and thoracic spine during the recent hospital stay in [facility] which was unremarkable  But patient reports that her pain is much worse and she is screaming out saying that she is in severe pain and oxycodone is not helping her at all  Was changed to p o  Dilaudid over night   · - continue robaxin 500 mg q 6  · - continue Toradol 30 mg q 6 hrs per acute pain for 5 days monitor renal function   · - continue oxy to 10 mg and 15 mg   · - continue iv morphine 4 mg q2 hrs prn for breakthrough  · -continue tylenol atc pt can refuse (she states it makes her break out in profuse sweats)   · - continue lidocaine patch  · Patient with history of intravenous drug abuse  · Iliacus muscle abscess is noted on CT scan, continue antibiotic treatment as above, no indication for drainage at this time    Bipolar 1 disorder [facility])  Assessment & Plan  · Patient refused Psychiatry and neuropsych evaluation, once pain more controlled will discuss again and have their input   · Supportive care    Septic embolism [facility])  Assessment & Plan  · Patient noted to have abnormalities seen in the CT of the chest abdomen and pelvis concerning for septic emboli  · There is pleural effusion on the CT scan of the chest and also on repeat chest x-ray  · Patient is rather asymptomatic from pulmonary standpoint  · Continue with the antibiotics  · Thoracic surgery have evaluated the pt and she is not short of breath     8/14-patient complaining of acute right hip pain; concerning for possible embolism  Consider imaging and will discuss with acute pain service  8/15-imaging right hip shows no osseous involvement; concern for proximity of abscess status SI joint, but no SI joint involvement at this time  Continue antibiotics monitor for improvement          VTE Pharmacologic Prophylaxis:   Pharmacologic: Heparin  Mechanical VTE Prophylaxis in Place: Yes    Patient Centered Rounds: I have performed bedside rounds with nursing staff today  Discussions with Specialists or Other Care Team Provider: acute pain    Education and Discussions with Family / Patient: patient, plan of care    Time Spent for Care: 20 minutes  More than 50% of total time spent on counseling and coordination of care as described above  Current Length of Stay: 6 day(s)    Current Patient Status: Inpatient   Certification Statement: The patient will continue to require additional inpatient hospital stay due to Endocarditis treatment    Discharge Plan:  Home after treatment of endocarditis is completed in hospital    Code Status: Level 1 - Full Code      Subjective:   Reports pain in her lower back  Denies any chest pain, shortness of breath, lightheadedness, dizziness  Objective:     Vitals:   Temp (24hrs), Av 1 °F (36 7 °C), Min:97 5 °F (36 4 °C), Max:99 °F (37 2 °C)    Temp:  [97 5 °F (36 4 °C)-99 °F (37 2 °C)] 99 °F (37 2 °C)  HR:  [102-125] 117  Resp:  [18-19] 18  BP: (122-135)/(74-80) 134/74  SpO2:  [94 %-97 %] 94 %  Body mass index is 23 3 kg/m²  Input and Output Summary (last 24 hours): Intake/Output Summary (Last 24 hours) at 2020 1634  Last data filed at 2020 1530  Gross per 24 hour   Intake 743 5 ml   Output 1600 ml   Net -856 5 ml       Physical Exam:     Physical Exam  Constitutional:       Appearance: Normal appearance  HENT:      Head: Normocephalic and atraumatic  Mouth/Throat:      Mouth: Mucous membranes are moist    Eyes:      Extraocular Movements: Extraocular movements intact  Pupils: Pupils are equal, round, and reactive to light  Neck:      Musculoskeletal: Normal range of motion and neck supple  Cardiovascular:      Rate and Rhythm: Normal rate and regular rhythm  Pulmonary:      Effort: Pulmonary effort is normal       Breath sounds: No wheezing or rales  Abdominal:      General: Abdomen is flat  Palpations: Abdomen is soft     Musculoskeletal: Right lower leg: No edema  Left lower leg: No edema  Skin:     General: Skin is warm and dry  Neurological:      General: No focal deficit present  Mental Status: She is alert and oriented to person, place, and time  Additional Data:     Labs:    Results from last 7 days   Lab Units 08/18/20  0529 08/17/20  0427  08/13/20  0447   WBC Thousand/uL 8 50 10 22*   < > 9 97   HEMOGLOBIN g/dL 7 3* 7 4*   < > 7 5*   HEMATOCRIT % 23 3* 23 8*   < > 23 5*   PLATELETS Thousands/uL 350 368   < > 372   NEUTROS PCT %  --   --   --  60   LYMPHS PCT %  --   --   --  29   LYMPHO PCT %  --  20   < >  --    MONOS PCT %  --   --   --  8   MONO PCT %  --  7   < >  --    EOS PCT %  --  0   < > 2    < > = values in this interval not displayed  Results from last 7 days   Lab Units 08/18/20  0600   SODIUM mmol/L 137   POTASSIUM mmol/L 4 1   CHLORIDE mmol/L 105   CO2 mmol/L 26   BUN mg/dL 14   CREATININE mg/dL 0 59*   ANION GAP mmol/L 6   CALCIUM mg/dL 8 1*   ALBUMIN g/dL 1 9*   TOTAL BILIRUBIN mg/dL 0 32   ALK PHOS U/L 145*   ALT U/L 53   AST U/L 118*   GLUCOSE RANDOM mg/dL 97                           * I Have Reviewed All Lab Data Listed Above  * Additional Pertinent Lab Tests Reviewed: All Labs Within Last 24 Hours Reviewed      Recent Cultures (last 7 days):     Results from last 7 days   Lab Units 08/15/20  1547 08/13/20  0446   BLOOD CULTURE  No Growth at 48 hrs   Staphylococcus aureus*   GRAM STAIN RESULT   --  Gram positive cocci in clusters*       Last 24 Hours Medication List:   Current Facility-Administered Medications   Medication Dose Route Frequency Provider Last Rate    calcium carbonate  1,000 mg Oral Daily PRN Mellody Nageotte, MD      cefazolin  2,000 mg Intravenous Q8H Asha Roca PA-C 2,000 mg (08/18/20 1450)    enoxaparin  40 mg Subcutaneous Q24H [facility] Royal Sekou MD      gabapentin  100 mg Oral TID Mellody Nageotte, MD      glycopyrrolate  0 2 mg Intravenous Q4H PRN Candy Seals PA-C  haloperidol lactate  2 mg Intramuscular Q30 Min PRN Mamta Ribera PA-C      ketamine  0 2 mg/kg/hr Intravenous Continuous Mamta Ribera PA-C 0 2 mg/kg/hr (08/18/20 0714)    lidocaine  2 patch Topical Daily Asha Roca PA-C      LORazepam  1 mg Intravenous Q1H PRN Mamta Ribera PA-C      magnesium citrate  296 mL Oral Daily PRN Keely Barrientos MD      methocarbamol  500 mg Oral [facility] Stere[place], CRNP      mirtazapine  30 mg Oral HS Diane Pereira MD      morphine injection  4 mg Intravenous Q2H PRN Stereomood, CRNP      ondansetron  4 mg Intravenous Q6H PRN Diane Pereira MD      oxyCODONE  10 mg Oral Q4H PRN Stereomood, CRNP      oxyCODONE  15 mg Oral Q4H PRN Stereomood, CRNP      saccharomyces boulardii  250 mg Oral BID Keely Barrientos MD      senna  1 tablet Oral BID Keely Barrientos MD          Today, Patient Was Seen By: Liu Davies MD    ** Please Note: Dictation voice to text software may have been used in the creation of this document   **

## 2020-08-18 NOTE — PROGRESS NOTES
Patient was sound asleep when entered into the room,upon waking up patient started complaining that she is in a lot of pain and wants iv morphine  Went to [place] the morphine and patient was sound asleep  I didn't wake her up  Pain management was present and made aware

## 2020-08-18 NOTE — PLAN OF CARE
Problem: Prexisting or High Potential for Compromised Skin Integrity  Goal: Skin integrity is maintained or improved  Description: INTERVENTIONS:  - Identify patients at risk for skin breakdown  - Assess and monitor skin integrity  - Assess and monitor nutrition and hydration status  - Monitor labs   - Assess for incontinence   - Turn and reposition patient  - Assist with mobility/ambulation  - Relieve pressure over bony prominences  - Avoid friction and shearing  - Provide appropriate hygiene as needed including keeping skin clean and dry  - Evaluate need for skin moisturizer/barrier cream  - Collaborate with interdisciplinary team   - Patient/family teaching  - Consider wound care consult   Outcome: Progressing     Problem: PAIN - ADULT  Goal: Verbalizes/displays adequate comfort level or baseline comfort level  Description: Interventions:  - Encourage patient to monitor pain and request assistance  - Assess pain using appropriate pain scale  - Administer analgesics based on type and severity of pain and evaluate response  - Implement non-pharmacological measures as appropriate and evaluate response  - Consider cultural and social influences on pain and pain management  - Notify physician/advanced practitioner if interventions unsuccessful or patient reports new pain  Outcome: Progressing     Problem: INFECTION - ADULT  Goal: Absence or prevention of progression during hospitalization  Description: INTERVENTIONS:  - Assess and monitor for signs and symptoms of infection  - Monitor lab/diagnostic results  - Monitor all insertion sites, i e  indwelling lines, tubes, and drains  - Monitor endotracheal if appropriate and nasal secretions for changes in amount and color  - [place] appropriate cooling/warming therapies per order  - Administer medications as ordered  - Instruct and encourage patient and family to use good hand hygiene technique  - Identify and instruct in appropriate isolation precautions for identified infection/condition  Outcome: Progressing  Goal: Absence of fever/infection during neutropenic period  Description: INTERVENTIONS:  - Monitor WBC    Outcome: Progressing     Problem: SAFETY ADULT  Goal: Patient will remain free of falls  Description: INTERVENTIONS:  - Assess patient frequently for physical needs  -  Identify cognitive and physical deficits and behaviors that affect risk of falls    -  [place] fall precautions as indicated by assessment   - Educate patient/family on patient safety including physical limitations  - Instruct patient to call for assistance with activity based on assessment  - Modify environment to reduce risk of injury  - Consider OT/PT consult to assist with strengthening/mobility  Outcome: Progressing  Goal: Maintain or return to baseline ADL function  Description: INTERVENTIONS:  -  Assess patient's ability to carry out ADLs; assess patient's baseline for ADL function and identify physical deficits which impact ability to perform ADLs (bathing, care of mouth/teeth, toileting, grooming, dressing, etc )  - Assess/evaluate cause of self-care deficits   - Assess range of motion  - Assess patient's mobility; develop plan if impaired  - Assess patient's need for assistive devices and provide as appropriate  - Encourage maximum independence but intervene and supervise when necessary  - Involve family in performance of ADLs  - Assess for home care needs following discharge   - Consider OT consult to assist with ADL evaluation and planning for discharge  - Provide patient education as appropriate  Outcome: Progressing  Goal: Maintain or return mobility status to optimal level  Description: INTERVENTIONS:  - Assess patient's baseline mobility status (ambulation, transfers, stairs, etc )    - Identify cognitive and physical deficits and behaviors that affect mobility  - Identify mobility aids required to assist with transfers and/or ambulation (gait belt, sit-to-stand, lift, walker, cane, etc )  - [place] fall precautions as indicated by assessment  - Record patient progress and toleration of activity level on Mobility SBAR; progress patient to next Phase/Stage  - Instruct patient to call for assistance with activity based on assessment  - Consider rehabilitation consult to assist with strengthening/weightbearing, etc   Outcome: Progressing     Problem: CARDIOVASCULAR - ADULT  Goal: Maintains optimal cardiac output and hemodynamic stability  Description: INTERVENTIONS:  - Monitor I/O, vital signs and rhythm  - Monitor for S/S and trends of decreased cardiac output  - Administer and titrate ordered vasoactive medications to optimize hemodynamic stability  - Assess quality of pulses, skin color and temperature  - Assess for signs of decreased coronary artery perfusion  - Instruct patient to report change in severity of symptoms  Outcome: Progressing  Goal: Absence of cardiac dysrhythmias or at baseline rhythm  Description: INTERVENTIONS:  - Continuous cardiac monitoring, vital signs, obtain 12 lead EKG if ordered  - Administer antiarrhythmic and heart rate control medications as ordered  - Monitor electrolytes and administer replacement therapy as ordered  Outcome: Progressing     Problem: METABOLIC, FLUID AND ELECTROLYTES - ADULT  Goal: Electrolytes maintained within normal limits  Description: INTERVENTIONS:  - Monitor labs and assess patient for signs and symptoms of electrolyte imbalances  - Administer electrolyte replacement as ordered  - Monitor response to electrolyte replacements, including repeat lab results as appropriate  - Instruct patient on fluid and nutrition as appropriate  Outcome: Progressing  Goal: Fluid balance maintained  Description: INTERVENTIONS:  - Monitor labs   - Monitor I/O and WT  - Instruct patient on fluid and nutrition as appropriate  - Assess for signs & symptoms of volume excess or deficit  Outcome: Progressing  Goal: Glucose maintained within target range  Description: INTERVENTIONS:  - Monitor Blood Glucose as ordered  - Assess for signs and symptoms of hyperglycemia and hypoglycemia  - Administer ordered medications to maintain glucose within target range  - Assess nutritional intake and initiate nutrition service referral as needed  Outcome: Progressing     Problem: HEMATOLOGIC - ADULT  Goal: Maintains hematologic stability  Description: INTERVENTIONS  - Assess for signs and symptoms of bleeding or hemorrhage  - Monitor labs  - Administer supportive blood products/factors as ordered and appropriate  Outcome: Progressing     Problem: Potential for Falls  Goal: Patient will remain free of falls  Description: INTERVENTIONS:  - Assess patient frequently for physical needs  -  Identify cognitive and physical deficits and behaviors that affect risk of falls    -  [place] fall precautions as indicated by assessment   - Educate patient/family on patient safety including physical limitations  - Instruct patient to call for assistance with activity based on assessment  - Modify environment to reduce risk of injury  - Consider OT/PT consult to assist with strengthening/mobility  Outcome: Progressing

## 2020-08-19 PROCEDURE — 99233 SBSQ HOSP IP/OBS HIGH 50: CPT | Performed by: INTERNAL MEDICINE

## 2020-08-19 PROCEDURE — 99232 SBSQ HOSP IP/OBS MODERATE 35: CPT | Performed by: INTERNAL MEDICINE

## 2020-08-19 PROCEDURE — 99232 SBSQ HOSP IP/OBS MODERATE 35: CPT | Performed by: PHYSICIAN ASSISTANT

## 2020-08-19 RX ORDER — GABAPENTIN 100 MG/1
200 CAPSULE ORAL 3 TIMES DAILY
Status: DISCONTINUED | OUTPATIENT
Start: 2020-08-19 | End: 2020-08-21

## 2020-08-19 RX ORDER — MORPHINE SULFATE 4 MG/ML
4 INJECTION, SOLUTION INTRAMUSCULAR; INTRAVENOUS
Status: DISCONTINUED | OUTPATIENT
Start: 2020-08-19 | End: 2020-08-26

## 2020-08-19 RX ORDER — LANOLIN ALCOHOL/MO/W.PET/CERES
6 CREAM (GRAM) TOPICAL
Status: DISCONTINUED | OUTPATIENT
Start: 2020-08-19 | End: 2020-09-13

## 2020-08-19 RX ADMIN — MORPHINE SULFATE 4 MG: 4 INJECTION INTRAVENOUS at 15:17

## 2020-08-19 RX ADMIN — LORAZEPAM 1 MG: 2 INJECTION INTRAMUSCULAR; INTRAVENOUS at 02:39

## 2020-08-19 RX ADMIN — MORPHINE SULFATE 4 MG: 4 INJECTION INTRAVENOUS at 04:41

## 2020-08-19 RX ADMIN — LORAZEPAM 1 MG: 2 INJECTION INTRAMUSCULAR; INTRAVENOUS at 01:27

## 2020-08-19 RX ADMIN — GABAPENTIN 100 MG: 100 CAPSULE ORAL at 08:20

## 2020-08-19 RX ADMIN — CEFAZOLIN SODIUM 2000 MG: 2 SOLUTION INTRAVENOUS at 23:57

## 2020-08-19 RX ADMIN — CEFAZOLIN SODIUM 2000 MG: 2 SOLUTION INTRAVENOUS at 15:25

## 2020-08-19 RX ADMIN — LORAZEPAM 1 MG: 2 INJECTION INTRAMUSCULAR; INTRAVENOUS at 15:11

## 2020-08-19 RX ADMIN — OXYCODONE HYDROCHLORIDE 15 MG: 10 TABLET ORAL at 08:19

## 2020-08-19 RX ADMIN — Medication 0.2 MG/KG/HR: at 23:56

## 2020-08-19 RX ADMIN — CEFAZOLIN SODIUM 2000 MG: 2 SOLUTION INTRAVENOUS at 08:24

## 2020-08-19 RX ADMIN — ENOXAPARIN SODIUM 40 MG: 40 INJECTION SUBCUTANEOUS at 08:20

## 2020-08-19 RX ADMIN — MELATONIN 6 MG: at 20:36

## 2020-08-19 RX ADMIN — Medication 250 MG: at 17:01

## 2020-08-19 RX ADMIN — MIRTAZAPINE 30 MG: 30 TABLET, FILM COATED ORAL at 20:35

## 2020-08-19 RX ADMIN — MORPHINE SULFATE 4 MG: 4 INJECTION INTRAVENOUS at 02:39

## 2020-08-19 RX ADMIN — OXYCODONE HYDROCHLORIDE 15 MG: 10 TABLET ORAL at 15:11

## 2020-08-19 RX ADMIN — MORPHINE SULFATE 4 MG: 4 INJECTION INTRAVENOUS at 11:37

## 2020-08-19 RX ADMIN — METHOCARBAMOL 500 MG: 500 TABLET, FILM COATED ORAL at 11:36

## 2020-08-19 RX ADMIN — Medication 250 MG: at 08:19

## 2020-08-19 RX ADMIN — LORAZEPAM 1 MG: 2 INJECTION INTRAMUSCULAR; INTRAVENOUS at 00:36

## 2020-08-19 RX ADMIN — METHOCARBAMOL 500 MG: 500 TABLET, FILM COATED ORAL at 17:01

## 2020-08-19 RX ADMIN — LORAZEPAM 1 MG: 2 INJECTION INTRAMUSCULAR; INTRAVENOUS at 04:41

## 2020-08-19 RX ADMIN — LORAZEPAM 1 MG: 2 INJECTION INTRAMUSCULAR; INTRAVENOUS at 18:24

## 2020-08-19 RX ADMIN — LORAZEPAM 1 MG: 2 INJECTION INTRAMUSCULAR; INTRAVENOUS at 08:17

## 2020-08-19 RX ADMIN — OXYCODONE HYDROCHLORIDE 15 MG: 10 TABLET ORAL at 04:06

## 2020-08-19 RX ADMIN — MORPHINE SULFATE 4 MG: 4 INJECTION INTRAVENOUS at 00:36

## 2020-08-19 RX ADMIN — GABAPENTIN 200 MG: 100 CAPSULE ORAL at 15:10

## 2020-08-19 RX ADMIN — MORPHINE SULFATE 4 MG: 4 INJECTION INTRAVENOUS at 20:36

## 2020-08-19 RX ADMIN — OXYCODONE HYDROCHLORIDE 15 MG: 10 TABLET ORAL at 18:23

## 2020-08-19 RX ADMIN — Medication 0.2 MG/KG/HR: at 04:06

## 2020-08-19 RX ADMIN — GABAPENTIN 200 MG: 100 CAPSULE ORAL at 20:35

## 2020-08-19 NOTE — PLAN OF CARE
Problem: Prexisting or High Potential for Compromised Skin Integrity  Goal: Skin integrity is maintained or improved  Description: INTERVENTIONS:  - Identify patients at risk for skin breakdown  - Assess and monitor skin integrity  - Assess and monitor nutrition and hydration status  - Monitor labs   - Assess for incontinence   - Turn and reposition patient  - Assist with mobility/ambulation  - Relieve pressure over bony prominences  - Avoid friction and shearing  - Provide appropriate hygiene as needed including keeping skin clean and dry  - Evaluate need for skin moisturizer/barrier cream  - Collaborate with interdisciplinary team   - Patient/family teaching  - Consider wound care consult   Outcome: Progressing     Problem: PAIN - ADULT  Goal: Verbalizes/displays adequate comfort level or baseline comfort level  Description: Interventions:  - Encourage patient to monitor pain and request assistance  - Assess pain using appropriate pain scale  - Administer analgesics based on type and severity of pain and evaluate response  - Implement non-pharmacological measures as appropriate and evaluate response  - Consider cultural and social influences on pain and pain management  - Notify physician/advanced practitioner if interventions unsuccessful or patient reports new pain  Outcome: Progressing     Problem: INFECTION - ADULT  Goal: Absence or prevention of progression during hospitalization  Description: INTERVENTIONS:  - Assess and monitor for signs and symptoms of infection  - Monitor lab/diagnostic results  - Monitor all insertion sites, i e  indwelling lines, tubes, and drains  - Monitor endotracheal if appropriate and nasal secretions for changes in amount and color  - [place] appropriate cooling/warming therapies per order  - Administer medications as ordered  - Instruct and encourage patient and family to use good hand hygiene technique  - Identify and instruct in appropriate isolation precautions for identified infection/condition  Outcome: Progressing  Goal: Absence of fever/infection during neutropenic period  Description: INTERVENTIONS:  - Monitor WBC    Outcome: Progressing     Problem: SAFETY ADULT  Goal: Patient will remain free of falls  Description: INTERVENTIONS:  - Assess patient frequently for physical needs  -  Identify cognitive and physical deficits and behaviors that affect risk of falls    -  [place] fall precautions as indicated by assessment   - Educate patient/family on patient safety including physical limitations  - Instruct patient to call for assistance with activity based on assessment  - Modify environment to reduce risk of injury  - Consider OT/PT consult to assist with strengthening/mobility  Outcome: Progressing  Goal: Maintain or return to baseline ADL function  Description: INTERVENTIONS:  -  Assess patient's ability to carry out ADLs; assess patient's baseline for ADL function and identify physical deficits which impact ability to perform ADLs (bathing, care of mouth/teeth, toileting, grooming, dressing, etc )  - Assess/evaluate cause of self-care deficits   - Assess range of motion  - Assess patient's mobility; develop plan if impaired  - Assess patient's need for assistive devices and provide as appropriate  - Encourage maximum independence but intervene and supervise when necessary  - Involve family in performance of ADLs  - Assess for home care needs following discharge   - Consider OT consult to assist with ADL evaluation and planning for discharge  - Provide patient education as appropriate  Outcome: Progressing  Goal: Maintain or return mobility status to optimal level  Description: INTERVENTIONS:  - Assess patient's baseline mobility status (ambulation, transfers, stairs, etc )    - Identify cognitive and physical deficits and behaviors that affect mobility  - Identify mobility aids required to assist with transfers and/or ambulation (gait belt, sit-to-stand, lift, walker, cane, etc )  - [place] fall precautions as indicated by assessment  - Record patient progress and toleration of activity level on Mobility SBAR; progress patient to next Phase/Stage  - Instruct patient to call for assistance with activity based on assessment  - Consider rehabilitation consult to assist with strengthening/weightbearing, etc   Outcome: Progressing     Problem: CARDIOVASCULAR - ADULT  Goal: Maintains optimal cardiac output and hemodynamic stability  Description: INTERVENTIONS:  - Monitor I/O, vital signs and rhythm  - Monitor for S/S and trends of decreased cardiac output  - Administer and titrate ordered vasoactive medications to optimize hemodynamic stability  - Assess quality of pulses, skin color and temperature  - Assess for signs of decreased coronary artery perfusion  - Instruct patient to report change in severity of symptoms  Outcome: Progressing  Goal: Absence of cardiac dysrhythmias or at baseline rhythm  Description: INTERVENTIONS:  - Continuous cardiac monitoring, vital signs, obtain 12 lead EKG if ordered  - Administer antiarrhythmic and heart rate control medications as ordered  - Monitor electrolytes and administer replacement therapy as ordered  Outcome: Progressing     Problem: METABOLIC, FLUID AND ELECTROLYTES - ADULT  Goal: Electrolytes maintained within normal limits  Description: INTERVENTIONS:  - Monitor labs and assess patient for signs and symptoms of electrolyte imbalances  - Administer electrolyte replacement as ordered  - Monitor response to electrolyte replacements, including repeat lab results as appropriate  - Instruct patient on fluid and nutrition as appropriate  Outcome: Progressing  Goal: Fluid balance maintained  Description: INTERVENTIONS:  - Monitor labs   - Monitor I/O and WT  - Instruct patient on fluid and nutrition as appropriate  - Assess for signs & symptoms of volume excess or deficit  Outcome: Progressing  Goal: Glucose maintained within target range  Description: INTERVENTIONS:  - Monitor Blood Glucose as ordered  - Assess for signs and symptoms of hyperglycemia and hypoglycemia  - Administer ordered medications to maintain glucose within target range  - Assess nutritional intake and initiate nutrition service referral as needed  Outcome: Progressing     Problem: HEMATOLOGIC - ADULT  Goal: Maintains hematologic stability  Description: INTERVENTIONS  - Assess for signs and symptoms of bleeding or hemorrhage  - Monitor labs  - Administer supportive blood products/factors as ordered and appropriate  Outcome: Progressing     Problem: Potential for Falls  Goal: Patient will remain free of falls  Description: INTERVENTIONS:  - Assess patient frequently for physical needs  -  Identify cognitive and physical deficits and behaviors that affect risk of falls    -  [place] fall precautions as indicated by assessment   - Educate patient/family on patient safety including physical limitations  - Instruct patient to call for assistance with activity based on assessment  - Modify environment to reduce risk of injury  - Consider OT/PT consult to assist with strengthening/mobility  Outcome: Progressing

## 2020-08-19 NOTE — PROGRESS NOTES
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  Initially patient was admitted to [facility] from 07/15-blood culture were positive for MSSA bacteremia, but patient signed AMA on 07/22  Her repeat blood culture done on 7/21 was negative after 5 days  · Patient was admitted to [facility] on 07/30-blood culture came back positive for MSSA and she left again is medical advice on 08/04  Her blood cultures came back positive on 07/30, but the bacteremia cleared as of 8 /2  Patient left AMA on 08/04  · Patient was readmitted to [address] [place] [address] on 08/10-blood culture came back positive for Staph aureus  Currently on cefazolin per Infectious Disease  · 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 during the fast hospital stay from 7/15-7/22  · Continue with the antibiotics per Infectious Disease  · Repeat blood culture (pt has been refusing sticks extensive discussion had with the pt and the nursing staff to have the best person obtaining her blood)   · tmax 101  · CT surgery appreciated  · Patient also with previous history of MSSA bacteremia /possible endocarditis in 2018-    8/14-patient had 1 blood culture drawn yesterday; tentatively positive for staph, awaiting speciation is  Continue current treatment plan  - remains afebrile although tachycardic; will discuss with patient alone further cultures to be drawn tomorrow    8/15 - No vascular access prevented repeat draws this AM; will attempt again once vascular access is obtained    8/16-PICC line inserted yesterday; blood cultures x1 obtained and pending    As per ID, continue current antibiotics, some concern however given her fever overnight increasing white count and tachycardia; will defer to ID for possible adjustments in antibiotic therapy  8/17-1/1 bottle blood cultures negative times 24 hours  Will eventually need PICC line replaced in 24-48 hours  Case management report HOST consult pending; total of 6 week antibiotics needed clear bacteremia  8/18:  Blood cultures from 08/13 positive, blood cultures from 08/15 with no growth  Possibly replace PICC line tomorrow  8/19:  Blood cultures from 08/15 remained with no growth, will repeat blood cultures today per ID recommendations  Acute bacterial endocarditis  Assessment & Plan  · Patient noted to have tricuspid while vegetation on echocardiogram done in July  Patient had a transthoracic and also transesophageal echocardiogram done during the last hospital stay  · Recent CT of the abdomen and pelvis and also CT chest shows abnormalities concerning for septic emboli  · Antibiotics per Infectious Disease  · Patient is transferred over here to be evaluated by CT surgery  · - at the time of their evaluation pt is bilgerant and uncooperative reportedly due to pain  · - she was noncommittal to abstain from illegal drugs or unintended use of medications  · - recommendations to continue iv abx use   · - when she continues to abstain and completes iv abx treatment they would consider surgical correction   · - would benefit from neuropsych and psychiatry input    8/15-repeat labs; awaiting repeat blood culture  Continue antibiotics as per ID  Intravenous drug abuse [facility])  Assessment & Plan  · Patient with acute drug abuse and likely the source of bacteremia    · Patient has a history of signing against medical advise  · May benefit from drug rehab eventually  · During the hospital stay in July of this year patient was positive for THC ,cocaine and opiates  · Still pending urine drug screen     Back pain  Assessment & Plan  · Patient is complaining of severe back pain mainly in the lower part of the back and also in the sacral region  · Refuses examination due to pain  · Patient had MRI of the lumbar and thoracic spine during the recent hospital stay in [facility] which was unremarkable  But patient reports that her pain is much worse and she is screaming out saying that she is in severe pain and oxycodone is not helping her at all  Was changed to p o  Dilaudid over night   · - continue robaxin 500 mg q 6  · - continue Toradol 30 mg q 6 hrs per acute pain for 5 days monitor renal function   · - continue oxy to 10 mg and 15 mg   · - adjust iv morphine 4 mg to q3 hrs prn for breakthrough  · -continue tylenol atc pt can refuse (she states it makes her break out in profuse sweats)   · - continue lidocaine patch  · Patient with history of intravenous drug abuse  · Iliacus muscle abscess is noted on CT scan, continue antibiotic treatment as above, no indication for drainage at this time    Bipolar 1 disorder [facility])  Assessment & Plan  · Patient refused Psychiatry and neuropsych evaluation, once pain more controlled will discuss again and have their input   · Supportive care    Septic embolism [facility])  Assessment & Plan  · Patient noted to have abnormalities seen in the CT of the chest abdomen and pelvis concerning for septic emboli  · There is pleural effusion on the CT scan of the chest and also on repeat chest x-ray  · Patient is rather asymptomatic from pulmonary standpoint  · Continue with the antibiotics  · Thoracic surgery have evaluated the pt and she is not short of breath     8/14-patient complaining of acute right hip pain; concerning for possible embolism  Consider imaging and will discuss with acute pain service  8/15-imaging right hip shows no osseous involvement; concern for proximity of abscess status SI joint, but no SI joint involvement at this time  Continue antibiotics monitor for improvement          VTE Pharmacologic Prophylaxis:   Pharmacologic: Heparin  Mechanical VTE Prophylaxis in Place: Yes    Patient Centered Rounds: I have performed bedside rounds with nursing staff today  Discussions with Specialists or Other Care Team Provider:  Infectious disease    Education and Discussions with Family / Patient:  Patient, plan of care    Time Spent for Care: 15 minutes  More than 50% of total time spent on counseling and coordination of care as described above  Current Length of Stay: 7 day(s)    Current Patient Status: Inpatient   Certification Statement: The patient will continue to require additional inpatient hospital stay due to Continue IV antibiotics    Discharge Plan:  Discharge from hospital when antibiotic course is complete    Code Status: Level 1 - Full Code      Subjective:   Reports that her pain is adequately controlled  Objective:     Vitals:   Temp (24hrs), Av 9 °F (36 6 °C), Min:97 6 °F (36 4 °C), Max:98 2 °F (36 8 °C)    Temp:  [97 6 °F (36 4 °C)-98 2 °F (36 8 °C)] 98 2 °F (36 8 °C)  HR:  [101-120] 101  Resp:  [18-19] 18  BP: (126-135)/(59-72) 132/70  SpO2:  [94 %-96 %] 94 %  Body mass index is 23 3 kg/m²  Input and Output Summary (last 24 hours): Intake/Output Summary (Last 24 hours) at 2020 1604  Last data filed at 2020 1259  Gross per 24 hour   Intake 1224 2 ml   Output 2600 ml   Net -1375 8 ml       Physical Exam:     Physical Exam  Constitutional:       General: She is not in acute distress  Appearance: She is not toxic-appearing  Comments: Lying in bed, somnolent   HENT:      Head: Normocephalic and atraumatic  Eyes:      Extraocular Movements: Extraocular movements intact  Cardiovascular:      Comments: Examination refused  Pulmonary:      Comments: Examination refused  Abdominal:      Comments: Examination reviewed   Neurological:      Mental Status: She is alert and oriented to person, place, and time     Psychiatric:         Mood and Affect: Mood normal  Behavior: Behavior normal          Additional Data:     Labs:    Results from last 7 days   Lab Units 08/18/20  0529 08/17/20  0427  08/13/20  0447   WBC Thousand/uL 8 50 10 22*   < > 9 97   HEMOGLOBIN g/dL 7 3* 7 4*   < > 7 5*   HEMATOCRIT % 23 3* 23 8*   < > 23 5*   PLATELETS Thousands/uL 350 368   < > 372   NEUTROS PCT %  --   --   --  60   LYMPHS PCT %  --   --   --  29   LYMPHO PCT %  --  20   < >  --    MONOS PCT %  --   --   --  8   MONO PCT %  --  7   < >  --    EOS PCT %  --  0   < > 2    < > = values in this interval not displayed  Results from last 7 days   Lab Units 08/18/20  0600   SODIUM mmol/L 137   POTASSIUM mmol/L 4 1   CHLORIDE mmol/L 105   CO2 mmol/L 26   BUN mg/dL 14   CREATININE mg/dL 0 59*   ANION GAP mmol/L 6   CALCIUM mg/dL 8 1*   ALBUMIN g/dL 1 9*   TOTAL BILIRUBIN mg/dL 0 32   ALK PHOS U/L 145*   ALT U/L 53   AST U/L 118*   GLUCOSE RANDOM mg/dL 97                           * I Have Reviewed All Lab Data Listed Above  * Additional Pertinent Lab Tests Reviewed: All Labs Within Last 24 Hours Reviewed      Recent Cultures (last 7 days):     Results from last 7 days   Lab Units 08/15/20  1547 08/13/20  0446   BLOOD CULTURE  No Growth at 72 hrs   Staphylococcus aureus*   GRAM STAIN RESULT   --  Gram positive cocci in clusters*       Last 24 Hours Medication List:   Current Facility-Administered Medications   Medication Dose Route Frequency Provider Last Rate    calcium carbonate  1,000 mg Oral Daily PRN Mike Tirado MD      cefazolin  2,000 mg Intravenous Q8H Asha Roca PA-C 2,000 mg (08/19/20 1525)    enoxaparin  40 mg Subcutaneous Q24H [address] Doc Marie MD      gabapentin  200 mg Oral TID Naun Gray MD      glycopyrrolate  0 2 mg Intravenous Q4H PRN Charlott Numbers, PA-C      haloperidol lactate  2 mg Intramuscular Q30 Min PRN Charlott Numbers, PA-C      ketamine  0 2 mg/kg/hr Intravenous Continuous Charlott Numbers, PA-C 0 2 mg/kg/hr (08/19/20 0406)    lidocaine  2 patch Topical Daily Asha Roca PA-C      LORazepam  1 mg Intravenous Q1H PRN Jin Fam PA-C      magnesium citrate  296 mL Oral Daily PRN Wil Young MD      methocarbamol  500 mg Oral [facility], CRNP      mirtazapine  30 mg Oral HS Viet Patino MD      morphine injection  4 mg Intravenous Q3H PRN Raman Murrieta MD      ondansetron  4 mg Intravenous Q6H PRN Viet Patino MD      oxyCODONE  10 mg Oral Q4H PRN [facility], CRMARKO      oxyCODONE  15 mg Oral Q4H PRN [facility], CRMARKO      saccharomyces boulardii  250 mg Oral BID Wil Young MD      senna  1 tablet Oral BID Wil Young MD          Today, Patient Was Seen By: Jose Roca MD    ** Please Note: Dictation voice to text software may have been used in the creation of this document   **

## 2020-08-19 NOTE — PLAN OF CARE
Problem: Prexisting or High Potential for Compromised Skin Integrity  Goal: Skin integrity is maintained or improved  Description: INTERVENTIONS:  - Identify patients at risk for skin breakdown  - Assess and monitor skin integrity  - Assess and monitor nutrition and hydration status  - Monitor labs   - Assess for incontinence   - Turn and reposition patient  - Assist with mobility/ambulation  - Relieve pressure over bony prominences  - Avoid friction and shearing  - Provide appropriate hygiene as needed including keeping skin clean and dry  - Evaluate need for skin moisturizer/barrier cream  - Collaborate with interdisciplinary team   - Patient/family teaching  - Consider wound care consult   8/19/2020 0942 by Sourav Oneal RN  Outcome: Progressing  8/19/2020 0834 by Sourav Oneal RN  Outcome: Progressing     Problem: PAIN - ADULT  Goal: Verbalizes/displays adequate comfort level or baseline comfort level  Description: Interventions:  - Encourage patient to monitor pain and request assistance  - Assess pain using appropriate pain scale  - Administer analgesics based on type and severity of pain and evaluate response  - Implement non-pharmacological measures as appropriate and evaluate response  - Consider cultural and social influences on pain and pain management  - Notify physician/advanced practitioner if interventions unsuccessful or patient reports new pain  8/19/2020 0942 by Sourav Oneal RN  Outcome: Progressing  8/19/2020 0834 by Sourav Oneal RN  Outcome: Progressing     Problem: INFECTION - ADULT  Goal: Absence or prevention of progression during hospitalization  Description: INTERVENTIONS:  - Assess and monitor for signs and symptoms of infection  - Monitor lab/diagnostic results  - Monitor all insertion sites, i e  indwelling lines, tubes, and drains  - Monitor endotracheal if appropriate and nasal secretions for changes in amount and color  - [place] appropriate cooling/warming therapies per order  - Administer medications as ordered  - Instruct and encourage patient and family to use good hand hygiene technique  - Identify and instruct in appropriate isolation precautions for identified infection/condition  8/19/2020 0942 by Trevor Torres RN  Outcome: Progressing  8/19/2020 0834 by Trevor Torres RN  Outcome: Progressing  Goal: Absence of fever/infection during neutropenic period  Description: INTERVENTIONS:  - Monitor WBC    8/19/2020 0942 by Trevor Torres RN  Outcome: Progressing  8/19/2020 0834 by Trevor Torres RN  Outcome: Progressing     Problem: SAFETY ADULT  Goal: Patient will remain free of falls  Description: INTERVENTIONS:  - Assess patient frequently for physical needs  -  Identify cognitive and physical deficits and behaviors that affect risk of falls    -  [place] fall precautions as indicated by assessment   - Educate patient/family on patient safety including physical limitations  - Instruct patient to call for assistance with activity based on assessment  - Modify environment to reduce risk of injury  - Consider OT/PT consult to assist with strengthening/mobility  8/19/2020 0942 by Trevor Torres RN  Outcome: Progressing  8/19/2020 0834 by Trevor Torres RN  Outcome: Progressing  Goal: Maintain or return to baseline ADL function  Description: INTERVENTIONS:  -  Assess patient's ability to carry out ADLs; assess patient's baseline for ADL function and identify physical deficits which impact ability to perform ADLs (bathing, care of mouth/teeth, toileting, grooming, dressing, etc )  - Assess/evaluate cause of self-care deficits   - Assess range of motion  - Assess patient's mobility; develop plan if impaired  - Assess patient's need for assistive devices and provide as appropriate  - Encourage maximum independence but intervene and supervise when necessary  - Involve family in performance of ADLs  - Assess for home care needs following discharge   - Consider OT consult to assist with ADL evaluation and planning for discharge  - Provide patient education as appropriate  8/19/2020 0942 by Iona Garcia RN  Outcome: Progressing  8/19/2020 0834 by Iona Garcia RN  Outcome: Progressing  Goal: Maintain or return mobility status to optimal level  Description: INTERVENTIONS:  - Assess patient's baseline mobility status (ambulation, transfers, stairs, etc )    - Identify cognitive and physical deficits and behaviors that affect mobility  - Identify mobility aids required to assist with transfers and/or ambulation (gait belt, sit-to-stand, lift, walker, cane, etc )  - [place] fall precautions as indicated by assessment  - Record patient progress and toleration of activity level on Mobility SBAR; progress patient to next Phase/Stage  - Instruct patient to call for assistance with activity based on assessment  - Consider rehabilitation consult to assist with strengthening/weightbearing, etc   8/19/2020 0942 by Iona Garcia RN  Outcome: Progressing  8/19/2020 0834 by Iona Garcia RN  Outcome: Progressing     Problem: CARDIOVASCULAR - ADULT  Goal: Maintains optimal cardiac output and hemodynamic stability  Description: INTERVENTIONS:  - Monitor I/O, vital signs and rhythm  - Monitor for S/S and trends of decreased cardiac output  - Administer and titrate ordered vasoactive medications to optimize hemodynamic stability  - Assess quality of pulses, skin color and temperature  - Assess for signs of decreased coronary artery perfusion  - Instruct patient to report change in severity of symptoms  8/19/2020 0942 by Iona Garcia RN  Outcome: Progressing  8/19/2020 0834 by Iona Garcia RN  Outcome: Progressing  Goal: Absence of cardiac dysrhythmias or at baseline rhythm  Description: INTERVENTIONS:  - Continuous cardiac monitoring, vital signs, obtain 12 lead EKG if ordered  - Administer antiarrhythmic and heart rate control medications as ordered  - Monitor electrolytes and administer replacement therapy as ordered  8/19/2020 2686 by Anita Pierre RN  Outcome: Progressing  8/19/2020 0834 by Anita Pierre RN  Outcome: Progressing     Problem: METABOLIC, FLUID AND ELECTROLYTES - ADULT  Goal: Electrolytes maintained within normal limits  Description: INTERVENTIONS:  - Monitor labs and assess patient for signs and symptoms of electrolyte imbalances  - Administer electrolyte replacement as ordered  - Monitor response to electrolyte replacements, including repeat lab results as appropriate  - Instruct patient on fluid and nutrition as appropriate  8/19/2020 0942 by Anita Pierre RN  Outcome: Progressing  8/19/2020 0834 by Anita Pierre RN  Outcome: Progressing  Goal: Fluid balance maintained  Description: INTERVENTIONS:  - Monitor labs   - Monitor I/O and WT  - Instruct patient on fluid and nutrition as appropriate  - Assess for signs & symptoms of volume excess or deficit  8/19/2020 0942 by Anita Pierre RN  Outcome: Progressing  8/19/2020 0834 by Anita Pierre RN  Outcome: Progressing  Goal: Glucose maintained within target range  Description: INTERVENTIONS:  - Monitor Blood Glucose as ordered  - Assess for signs and symptoms of hyperglycemia and hypoglycemia  - Administer ordered medications to maintain glucose within target range  - Assess nutritional intake and initiate nutrition service referral as needed  8/19/2020 0942 by Anita Pierre RN  Outcome: Progressing  8/19/2020 0834 by Anita Pierre RN  Outcome: Progressing     Problem: HEMATOLOGIC - ADULT  Goal: Maintains hematologic stability  Description: INTERVENTIONS  - Assess for signs and symptoms of bleeding or hemorrhage  - Monitor labs  - Administer supportive blood products/factors as ordered and appropriate  8/19/2020 0942 by Anita Pierre RN  Outcome: Progressing  8/19/2020 0834 by Anita Pierre RN  Outcome: Progressing     Problem: Potential for Falls  Goal: Patient will remain free of falls  Description: INTERVENTIONS:  - Assess patient frequently for physical needs  -  Identify cognitive and physical deficits and behaviors that affect risk of falls    -  [place] fall precautions as indicated by assessment   - Educate patient/family on patient safety including physical limitations  - Instruct patient to call for assistance with activity based on assessment  - Modify environment to reduce risk of injury  - Consider OT/PT consult to assist with strengthening/mobility  8/19/2020 0942 by Janis Villagomez RN  Outcome: Progressing  8/19/2020 0834 by Janis Villagomez RN  Outcome: Progressing

## 2020-08-19 NOTE — ASSESSMENT & PLAN NOTE
· Patient is complaining of severe back pain mainly in the lower part of the back and also in the sacral region  · Refuses examination due to pain  · Patient had MRI of the lumbar and thoracic spine during the recent hospital stay in [facility] which was unremarkable  But patient reports that her pain is much worse and she is screaming out saying that she is in severe pain and oxycodone is not helping her at all  Was changed to p o  Dilaudid over night     · - continue robaxin 500 mg q 6  · - continue Toradol 30 mg q 6 hrs per acute pain for 5 days monitor renal function   · - continue oxy to 10 mg and 15 mg   · - adjust iv morphine 4 mg to q3 hrs prn for breakthrough  · -continue tylenol atc pt can refuse (she states it makes her break out in profuse sweats)   · - continue lidocaine patch  · Patient with history of intravenous drug abuse  · Iliacus muscle abscess is noted on CT scan, continue antibiotic treatment as above, no indication for drainage at this time

## 2020-08-19 NOTE — PROGRESS NOTES
Progress Note - Infectious Disease   Lj Mendoza 34 y o  female MRN: [id number]  Unit/Bed#: [facility] 425-01 Encounter: [id number]      Impression/Plan:  1  Recurrent/persistent MSSA bacteremia with TV infective endocarditis   Blood cultures from 8/10 remain positive   Prolonged course of IV antibiotic was previously recommended, but patient has left AMA on 2 prior occasions (once at [facility] and once from [facility])    She remains hemodynamically stable despite her ongoing bacteremia  Repeat blood cultures negative thus far  -continue cefazolin for now at current dose  -recheck blood cultures x2 sets  -follow up repeat blood cultures  -if bacteremia persists, will change to nafcillin  -recheck CBC with diff and CMP  -if neck set of blood cultures remain negative, can likely salvage PICC line as it does not appear to have been placed during a time of active bacteremia     2  Tricuspid valve endocarditis, 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  7/21 MELISSA showed large vegetation on TV with severe regurgitation  7/15 CT abdomen/pelvis also showed bilateral renal parenchymal abnormalities concerning for septic emboli   No intra-abdominal abscess     -antibiotic plan as above  -CT surgery is following      3  Right iliacus muscle abscesses-very small and unlikely to be able to be drained   At the level of the mid SI joint but the SI joint appears okay   -antibiotics as above  -pain management     4  Recent left foot cellulitis with abscess   Likely site of injection of IV drugs versus ectopic foci in the setting of MSSA bacteremia   This appears to have healed with no evidence of active infection   -antibiotic plan as above      5  Back pain   More likely secondary to chronic pain, opioid dependence   8/1/20 Thoracic and lumbar spine MRIs performed at [email or web address] [place] negative were for abscess or osteomyelitis     -monitor back pain   -serial exams  -acute pain service follow-up     6   Active IV heroin abuse   This is the causative factor for development of bacteremia and infective endocarditis   Patient has left AMA on prior occasions   HIV screen negative  -patient is not a candidate for at home PICC line/IV antibiotics  -host evaluation if patient will agree  -acute pain service follow-up     7  Chronic HCV infection, transaminitis   Recent HIV was negative   The LFTs are waxing waning  -monitor LFTs     Antibiotics:  Cefazolin restarted 10    Subjective:  Patient has no fever, chills, sweats; no nausea, vomiting, diarrhea; no cough, shortness of breath; still with leg pain  No new symptoms  Objective:  Vitals:  Temp:  [97 8 °F (36 6 °C)-99 °F (37 2 °C)] 97 8 °F (36 6 °C)  HR:  [102-120] 115  Resp:  [18-19] 19  BP: (126-135)/(59-78) 126/59  SpO2:  [94 %-96 %] 96 %  Temp (24hrs), Av 3 °F (36 8 °C), Min:97 8 °F (36 6 °C), Max:99 °F (37 2 °C)  Current: Temperature: 97 8 °F (36 6 °C)    Physical Exam:   General Appearance:  Alert, interactive, nontoxic, no acute distress  Throat: Oropharynx moist without lesions  Lungs:   Clear to auscultation bilaterally; no wheezes, rhonchi or rales; respirations unlabored   Heart:  Tachycardia; no murmur, rub or gallop   Abdomen:   Soft, non-tender, non-distended, positive bowel sounds  Extremities: No clubbing, cyanosis or edema   Skin: No new rashes or lesions  No draining wounds noted         Labs, Imaging, & Other studies:   All pertinent labs and imaging studies were personally reviewed  Results from last 7 days   Lab Units 20  0529 20  0427 20  0556   WBC Thousand/uL 8 50 10 22* 11 66*   HEMOGLOBIN g/dL 7 3* 7 4* 7 8*   PLATELETS Thousands/uL 350 368 393*     Results from last 7 days   Lab Units 20  0600 20  0427 20  0556  20  0943   SODIUM mmol/L 137 138 135*   < > 138   POTASSIUM mmol/L 4 1 4 5 4 5   < > 4 2   CHLORIDE mmol/L 105 105 103   < > 106   CO2 mmol/L 26 26 24   < > 26   BUN mg/dL 14 11 11   < > 10 CREATININE mg/dL 0 59* 0 53* 0 50*   < > 0 55*   EGFR ml/min/1 73sq m 126 131 133   < > 129   CALCIUM mg/dL 8 1* 8 3 8 5   < > 7 8*   AST U/L 118*  --  198*  --  45   ALT U/L 53  --  60  --  18   ALK PHOS U/L 145*  --  139*  --  141*    < > = values in this interval not displayed  Results from last 7 days   Lab Units 08/15/20  1547 08/13/20  0446   BLOOD CULTURE  No Growth at 72 hrs   Staphylococcus aureus*   GRAM STAIN RESULT   --  Gram positive cocci in clusters*

## 2020-08-19 NOTE — ASSESSMENT & PLAN NOTE
· 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  Initially patient was admitted to [address] [place] [address] from 07/15-blood culture were positive for MSSA bacteremia, but patient signed AMA on 07/22  Her repeat blood culture done on 7/21 was negative after 5 days  · Patient was admitted to [facility] on 07/30-blood culture came back positive for MSSA and she left again is medical advice on 08/04  Her blood cultures came back positive on 07/30, but the bacteremia cleared as of 8 /2  Patient left AMA on 08/04  · Patient was readmitted to [address] [place] [address] on 08/10-blood culture came back positive for Staph aureus  Currently on cefazolin per Infectious Disease  · 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 during the fast hospital stay from 7/15-7/22  · Continue with the antibiotics per Infectious Disease  · Repeat blood culture (pt has been refusing sticks extensive discussion had with the pt and the nursing staff to have the best person obtaining her blood)   · tmax 101  · CT surgery appreciated  · Patient also with previous history of MSSA bacteremia /possible endocarditis in 2018-    8/14-patient had 1 blood culture drawn yesterday; tentatively positive for staph, awaiting speciation is  Continue current treatment plan  - remains afebrile although tachycardic; will discuss with patient alone further cultures to be drawn tomorrow    8/15 - No vascular access prevented repeat draws this AM; will attempt again once vascular access is obtained    8/16-PICC line inserted yesterday; blood cultures x1 obtained and pending  As per ID, continue current antibiotics, some concern however given her fever overnight increasing white count and tachycardia; will defer to ID for possible adjustments in antibiotic therapy  8/17-1/1 bottle blood cultures negative times 24 hours  Will eventually need PICC line replaced in 24-48 hours    Case management report HOST consult pending; total of 6 week antibiotics needed clear bacteremia  8/18:  Blood cultures from 08/13 positive, blood cultures from 08/15 with no growth  Possibly replace PICC line tomorrow  8/19:  Blood cultures from 08/15 remained with no growth, will repeat blood cultures today per ID recommendations

## 2020-08-19 NOTE — PROGRESS NOTES
Progress Note - Acute Pain Service    Yari Olsen 34 y o  female MRN: [id number]  Unit/Bed#: [facility] 425-01 Encounter: [id number]      Assessment:   Principal Problem:    Bacteremia due to Staphylococcus aureus  Active Problems:    Acute bacterial endocarditis    Septic embolism ([facility] [address] )    Bipolar 1 disorder (HCC)    Back pain    Intravenous drug abuse ([facility] )    Yari Olsen is a 34 y o  female  admitted with bacteremia and tricuspid valve endocarditis  Had been complaining of worsening right hip pain and found to have iliacus muscle abscess likely resulting in pain  Subjective:  Patient states that she feels generally better since admission 1 week ago  Is currently content with her current pain regimen  Explained to the patient that we need to start weaning some of the IV morphine slowly  At this point she became very upset and resistant  After an extended conversation, the patient agrees with decreasing the frequency of the Morphine and increasing gabapentin  Patient states she has not yet had a bowel movement because she has been unable to get out of bed due to her right hip pain  Plan:   - oxycodone 10 mg p o  q 4 hours p r n  moderate pain  Oxycodone 15 mg p o  q 4 hours p r n  severe pain  Morphine 4 mg IV q 3 hours p r n  breakthrough pain  Ketamine infusion at 0 2 mg/kg/hr  - - Gabapentin 200 mg PO TID  - Robaxin 500 mg PO q6hrs   - Lidocaine patches to affected areas 12 hours on, 12 hours off    - Senna 1 tablet PO qhs    APS will continue to follow  Please call  / [phone] (/ between [phone] and on weekends) with questions or concerns    Pain History  Current pain location(s):  Right hip  Pain Scale:   10/10  Quality:  Sharp  24 hour history:  Patient continues to have right hip pain however states that her pain has generally improved  Opioid requirement previous 24 hours:  Oxycodone 90 mg p o , morphine 28 mg IV  Meds/Allergies   all current active meds have been reviewed, current meds:   Current Facility-Administered Medications   Medication Dose Route Frequency    calcium carbonate (TUMS) chewable tablet 1,000 mg  1,000 mg Oral Daily PRN    ceFAZolin (ANCEF) IVPB (premix) 2,000 mg 50 mL  2,000 mg Intravenous Q8H    enoxaparin (LOVENOX) subcutaneous injection 40 mg  40 mg Subcutaneous Q24H JEFF    gabapentin (NEURONTIN) capsule 200 mg  200 mg Oral TID    glycopyrrolate (ROBINUL) injection 0 2 mg  0 2 mg Intravenous Q4H PRN    haloperidol lactate (HALDOL) injection 2 mg  2 mg Intramuscular Q30 Min PRN    ketamine 250 mg (STANDARD CONCENTRATION) IV in sodium chloride 0 9% 250 mL  0 2 mg/kg/hr Intravenous Continuous    lidocaine (LIDODERM) 5 % patch 2 patch  2 patch Topical Daily    LORazepam (ATIVAN) injection 1 mg  1 mg Intravenous Q1H PRN    magnesium citrate (CITROMA) oral solution 296 mL  296 mL Oral Daily PRN    methocarbamol (ROBAXIN) tablet 500 mg  500 mg Oral Q6H [address]    mirtazapine (REMERON) tablet 30 mg  30 mg Oral HS    morphine (PF) 4 mg/mL injection 4 mg  4 mg Intravenous Q3H PRN    ondansetron (ZOFRAN) injection 4 mg  4 mg Intravenous Q6H PRN    oxyCODONE (ROXICODONE) immediate release tablet 10 mg  10 mg Oral Q4H PRN    oxyCODONE (ROXICODONE) IR tablet 15 mg  15 mg Oral Q4H PRN    saccharomyces boulardii (FLORASTOR) capsule 250 mg  250 mg Oral BID    senna (SENOKOT) tablet 8 6 mg  1 tablet Oral BID    and PTA meds:   None       Allergies   Allergen Reactions    Cat Hair Extract     Dog Epithelium     Latex     Pollen Extract        Objective     Temp:  [97 6 °F (36 4 °C)-99 °F (37 2 °C)] 97 6 °F (36 4 °C)  HR:  [101-120] 101  Resp:  [18-19] 19  BP: (126-135)/(59-74) 135/67    Physical Exam  Vitals signs and nursing note reviewed  Constitutional:       General: She is awake  She is not in acute distress  Appearance: Normal appearance     Eyes:      Conjunctiva/sclera: Conjunctivae normal       Pupils: Pupils are equal, round, and reactive to light  Cardiovascular:      Rate and Rhythm: Normal rate and regular rhythm  Musculoskeletal:      Right hip: She exhibits decreased range of motion, tenderness and bony tenderness  She exhibits no swelling, no crepitus and no deformity  Skin:     General: Skin is warm and dry  Neurological:      General: No focal deficit present  Mental Status: She is alert and oriented to person, place, and time  GCS: GCS eye subscore is 4  GCS verbal subscore is 5  GCS motor subscore is 6  Psychiatric:         Behavior: Behavior is cooperative  Lab Results:   Results from last 7 days   Lab Units 08/18/20  0529   WBC Thousand/uL 8 50   HEMOGLOBIN g/dL 7 3*   HEMATOCRIT % 23 3*   PLATELETS Thousands/uL 350      Results from last 7 days   Lab Units 08/18/20  0600   POTASSIUM mmol/L 4 1   CHLORIDE mmol/L 105   CO2 mmol/L 26   BUN mg/dL 14   CREATININE mg/dL 0 59*   CALCIUM mg/dL 8 1*   ALK PHOS U/L 145*   ALT U/L 53   AST U/L 118*       Imaging Studies: I have personally reviewed pertinent reports  EKG, Pathology, and Other Studies: I have personally reviewed pertinent reports  Counseling / Coordination of Care  Total floor / unit time spent today 30 minutes  Greater than 50% of total time was spent with the patient and / or family counseling and / or coordination of care  A description of the counseling / coordination of care:  Patient interview, physical examination, review of medical record, review of imaging and laboratory data, development of pain management plan, discussion of pain management plan with patient, nursing and primary service  Please note that the APS provides consultative services regarding pain management only    With the exception of ketamine and epidural infusions and except when indicated, final decisions regarding starting or changing doses of analgesic medications are at the discretion of the consulting service  Off hours consultation and/or medication management is generally not available      Darcy Nash PA-C  Acute Pain Service

## 2020-08-19 NOTE — UTILIZATION REVIEW
Continued Stay Review    Date: 8/169/2020                         Current Patient Class:  Inpatient   Current Level of Care: med Surg     HPI:29 y o  female initially admitted on 8/212 as a transfer form [facility] with bacteremia  here for CT surgery evaluation for tricuspid valve endocarditis  Hx of known IV drug abuse, with continued use, and multiple admission she signed out AMA  Per Ct surgery patient to complete IV antibiotics as per Infectious Disease and with continued abstinence they with reevaluate for possible surgical correction  Assessment/Plan: 8/19 Infectious disease  continue Cefazolin following blood cultures, 8/13 positive - 8/15 no growth   PICC line inserted on 8/16, may require replacement       Pertinent Labs/Diagnostic Results:       Results from last 7 days   Lab Units 08/18/20  0529 08/17/20  0427 08/16/20  0556 08/15/20  1544 08/14/20  0943 08/13/20  0447   WBC Thousand/uL 8 50 10 22* 11 66* 10 92* 10 64* 9 97   HEMOGLOBIN g/dL 7 3* 7 4* 7 8* 7 3* 7 4* 7 5*   HEMATOCRIT % 23 3* 23 8* 24 4* 22 7* 22 7* 23 5*   PLATELETS Thousands/uL 350 368 393* 359 360 372   NEUTROS ABS Thousands/µL  --   --   --   --   --  5 87         Results from last 7 days   Lab Units 08/18/20  0600 08/18/20  0558 08/17/20  0427 08/16/20  0556 08/15/20  1544 08/14/20  0943 08/13/20  0447   SODIUM mmol/L 137  --  138 135* 136 138 139   POTASSIUM mmol/L 4 1  --  4 5 4 5 4 2 4 2 3 1*   CHLORIDE mmol/L 105  --  105 103 105 106 103   CO2 mmol/L 26  --  26 24 26 26 28   ANION GAP mmol/L 6  --  7 8 5 6 8   BUN mg/dL 14  --  11 11 9 10 10   CREATININE mg/dL 0 59*  --  0 53* 0 50* 0 47* 0 55* 0 40*   EGFR ml/min/1 73sq m 126  --  131 133 136 129 143   CALCIUM mg/dL 8 1*  --  8 3 8 5 8 0* 7 8* 7 9*   MAGNESIUM mg/dL  --  2 1  --  2 0  --   --  2 1   PHOSPHORUS mg/dL 5 0*  --   --  3 4  --   --   --      Results from last 7 days   Lab Units 08/18/20  0600 08/16/20  0556 08/14/20  0943   AST U/L 118* 198* 45   ALT U/L 53 60 18   ALK PHOS U/L 145* 139* 141*   TOTAL PROTEIN g/dL 7 4 7 3 6 6   ALBUMIN g/dL 1 9* 1 8* 1 6*   TOTAL BILIRUBIN mg/dL 0 32 0 37 0 25         Results from last 7 days   Lab Units 08/18/20  0600 08/17/20  0427 08/16/20  0556 08/15/20  1544 08/14/20  0943 08/13/20  0447   GLUCOSE RANDOM mg/dL 97 85 108 97 104 108     Results from last 7 days   Lab Units 08/13/20  0447   SED RATE mm/hour 82*       Results from last 7 days   Lab Units 08/15/20  1547 08/13/20  0446   BLOOD CULTURE  No Growth at 72 hrs   Staphylococcus aureus*   GRAM STAIN RESULT   --  Gram positive cocci in clusters*         Vital Signs:   Date/Time   Temp   Pulse   Resp   BP   MAP (mmHg)   SpO2   O2 Device   Patient Position - Orthostatic VS    08/19/20 0900                     None (Room air)       08/19/20 0845   97 6 °F (36 4 °C)   101      135/67   90   95 %      Lying    08/18/20 2317   97 8 °F (36 6 °C)   115Abnormal     19   126/59   86   96 %   None (Room air)   Lying    08/18/20 2000                     None (Room air)       08/18/20 1920   98 °F (36 7 °C)   120Abnormal     18   128/72   92   95 %   None (Room air)   Lying    08/18/20 1530   99 °F (37 2 °C)   117Abnormal     18   134/74   96   94 %   None (Room air)   Lying    08/18/20 1100   98 2 °F (36 8 °C)   102   18   135/78   105   96 %      Lying    08/18/20 0900                     None (Room air)       08/18/20 0700   98 2 °F (36 8 °C)   102   18   135/80   100   95 %      Sitting    08/18/20 0256   97 9 °F (36 6 °C)   125Abnormal     18   130/78   96   97 %   None (Room air)   Lying    08/17/20 2247   97 8 °F (36 6 °C)   102   19   122/74   92   95 %   None (Room air)   Lying    08/17/20 1901   97 5 °F (36 4 °C)   105   18   131/76   94         Lying    08/17/20 1500   98 2 °F (36 8 °C)   100   17   135/67   90   95 %   None (Room air)   Lying    08/17/20 1119   98 5 °F (36 9 °C)   102   16   127/78   89   97 %   None (Room air)   Lying    08/17/20 0800      107Abnormal        117/77      95 %   None (Room air)       08/17/20 0700   98 2 °F (36 8 °C)   103      128/78   96   94 %      Lying    08/17/20 0400   98 8 °F (37 1 °C)   115Abnormal     19   125/73   94   97 %   None (Room air)   Lying           Medications:   Scheduled Medications:  cefazolin, 2,000 mg, Intravenous, Q8H   enoxaparin, 40 mg, Subcutaneous, Q24H JEFF  gabapentin, 100 mg, Oral, TID  lidocaine, 2 patch, Topical, Daily  methocarbamol, 500 mg, Oral, Q6H JEFF  mirtazapine, 30 mg, Oral, HS  saccharomyces boulardii, 250 mg, Oral, BID  senna, 1 tablet, Oral, BID      Continuous IV Infusions:  ketamine, 0 2 mg/kg/hr, Intravenous, Continuous      PRN Meds:  calcium carbonate, 1,000 mg, Oral, Daily PRN  glycopyrrolate, 0 2 mg, Intravenous, Q4H PRN  haloperidol lactate, 2 mg, Intramuscular, Q30 Min PRN  LORazepam, 1 mg, Intravenous, Q1H DC- 8/8 x 7 - 8/19 x 5   magnesium citrate, 296 mL, Oral, Daily PRN  morphine injection, 4 mg, Intravenous, Q2H PRN - 8/18 x 5-8/19 x 4   ondansetron, 4 mg, Intravenous, Q6H PRN  oxyCODONE, 10 mg, Oral, Q4H PRN  oxyCODONE, 15 mg, Oral, Q4H PRN - 8/18 x 5 - 8/19 x 2         Discharge Plan: [facility]     Network Utilization Review Department  [email or web address]  ATTENTION: Please call with any questions or concerns to [phone] and carefully listen to the prompts so that you are directed to the right person  All voicemails are confidential   Ty Limb all requests for admission clinical reviews, approved or denied determinations and any other requests to dedicated fax number below belonging to the campus where the patient is receiving treatment   List of dedicated fax numbers for the Facilities:  FACILITY NAME UR FAX NUMBER   ADMISSION DENIALS (Administrative/Medical Necessity) [phone]   [address] (Maternity/NICU/Pediatrics) [phone]   Gaby Molina [address] [facility] [phone]   Otilio Connelly Dara Pollard [phone]   Katty Burkett [facility] Hammad [address] [facility] [phone]   [facility]  [phone] [address] [facility]  [phone] [address] [facility] [address] [facility] [phone]

## 2020-08-19 NOTE — ASSESSMENT & PLAN NOTE
· Patient with acute drug abuse and likely the source of bacteremia    · Patient has a history of signing against medical advise  · May benefit from drug rehab eventually  · During the hospital stay in July of this year patient was positive for [facility] ,cocaine and opiates  · Still pending urine drug screen

## 2020-08-20 LAB — BACTERIA BLD CULT: NORMAL

## 2020-08-20 PROCEDURE — 99232 SBSQ HOSP IP/OBS MODERATE 35: CPT | Performed by: INTERNAL MEDICINE

## 2020-08-20 PROCEDURE — 99232 SBSQ HOSP IP/OBS MODERATE 35: CPT | Performed by: PHYSICIAN ASSISTANT

## 2020-08-20 PROCEDURE — 99233 SBSQ HOSP IP/OBS HIGH 50: CPT | Performed by: INTERNAL MEDICINE

## 2020-08-20 RX ADMIN — OXYCODONE HYDROCHLORIDE 15 MG: 10 TABLET ORAL at 05:03

## 2020-08-20 RX ADMIN — GABAPENTIN 200 MG: 100 CAPSULE ORAL at 21:08

## 2020-08-20 RX ADMIN — MORPHINE SULFATE 4 MG: 4 INJECTION INTRAVENOUS at 03:02

## 2020-08-20 RX ADMIN — Medication 250 MG: at 17:52

## 2020-08-20 RX ADMIN — OXYCODONE HYDROCHLORIDE 15 MG: 10 TABLET ORAL at 00:31

## 2020-08-20 RX ADMIN — MORPHINE SULFATE 4 MG: 4 INJECTION INTRAVENOUS at 10:01

## 2020-08-20 RX ADMIN — MORPHINE SULFATE 4 MG: 4 INJECTION INTRAVENOUS at 15:15

## 2020-08-20 RX ADMIN — MORPHINE SULFATE 4 MG: 4 INJECTION INTRAVENOUS at 23:27

## 2020-08-20 RX ADMIN — LORAZEPAM 1 MG: 2 INJECTION INTRAMUSCULAR; INTRAVENOUS at 05:02

## 2020-08-20 RX ADMIN — MORPHINE SULFATE 4 MG: 4 INJECTION INTRAVENOUS at 20:04

## 2020-08-20 RX ADMIN — LORAZEPAM 1 MG: 2 INJECTION INTRAMUSCULAR; INTRAVENOUS at 00:31

## 2020-08-20 RX ADMIN — OXYCODONE HYDROCHLORIDE 15 MG: 10 TABLET ORAL at 17:53

## 2020-08-20 RX ADMIN — OXYCODONE HYDROCHLORIDE 15 MG: 10 TABLET ORAL at 21:09

## 2020-08-20 RX ADMIN — OXYCODONE HYDROCHLORIDE 15 MG: 10 TABLET ORAL at 08:48

## 2020-08-20 RX ADMIN — METHOCARBAMOL 500 MG: 500 TABLET, FILM COATED ORAL at 23:27

## 2020-08-20 RX ADMIN — GABAPENTIN 200 MG: 100 CAPSULE ORAL at 15:15

## 2020-08-20 RX ADMIN — LORAZEPAM 1 MG: 2 INJECTION INTRAMUSCULAR; INTRAVENOUS at 08:49

## 2020-08-20 RX ADMIN — LORAZEPAM 1 MG: 2 INJECTION INTRAMUSCULAR; INTRAVENOUS at 21:08

## 2020-08-20 RX ADMIN — CEFAZOLIN SODIUM 2000 MG: 2 SOLUTION INTRAVENOUS at 15:07

## 2020-08-20 RX ADMIN — METHOCARBAMOL 500 MG: 500 TABLET, FILM COATED ORAL at 12:27

## 2020-08-20 RX ADMIN — CEFAZOLIN SODIUM 2000 MG: 2 SOLUTION INTRAVENOUS at 06:57

## 2020-08-20 RX ADMIN — OXYCODONE HYDROCHLORIDE 15 MG: 10 TABLET ORAL at 12:27

## 2020-08-20 RX ADMIN — MORPHINE SULFATE 4 MG: 4 INJECTION INTRAVENOUS at 07:04

## 2020-08-20 RX ADMIN — LIDOCAINE 5% 2 PATCH: 700 PATCH TOPICAL at 06:57

## 2020-08-20 RX ADMIN — Medication 250 MG: at 08:48

## 2020-08-20 RX ADMIN — MELATONIN 6 MG: at 21:08

## 2020-08-20 RX ADMIN — METHOCARBAMOL 500 MG: 500 TABLET, FILM COATED ORAL at 17:52

## 2020-08-20 RX ADMIN — GABAPENTIN 200 MG: 100 CAPSULE ORAL at 08:48

## 2020-08-20 RX ADMIN — MIRTAZAPINE 30 MG: 30 TABLET, FILM COATED ORAL at 21:08

## 2020-08-20 RX ADMIN — LORAZEPAM 1 MG: 2 INJECTION INTRAMUSCULAR; INTRAVENOUS at 16:10

## 2020-08-20 RX ADMIN — METHOCARBAMOL 500 MG: 500 TABLET, FILM COATED ORAL at 05:03

## 2020-08-20 RX ADMIN — ENOXAPARIN SODIUM 40 MG: 40 INJECTION SUBCUTANEOUS at 08:49

## 2020-08-20 RX ADMIN — LORAZEPAM 1 MG: 2 INJECTION INTRAMUSCULAR; INTRAVENOUS at 03:08

## 2020-08-20 RX ADMIN — LORAZEPAM 1 MG: 2 INJECTION INTRAMUSCULAR; INTRAVENOUS at 12:27

## 2020-08-20 RX ADMIN — CEFAZOLIN SODIUM 2000 MG: 2 SOLUTION INTRAVENOUS at 23:28

## 2020-08-20 RX ADMIN — METHOCARBAMOL 500 MG: 500 TABLET, FILM COATED ORAL at 00:31

## 2020-08-20 NOTE — PLAN OF CARE
Problem: Prexisting or High Potential for Compromised Skin Integrity  Goal: Skin integrity is maintained or improved  Description: INTERVENTIONS:  - Identify patients at risk for skin breakdown  - Assess and monitor skin integrity  - Assess and monitor nutrition and hydration status  - Monitor labs   - Assess for incontinence   - Turn and reposition patient  - Assist with mobility/ambulation  - Relieve pressure over bony prominences  - Avoid friction and shearing  - Provide appropriate hygiene as needed including keeping skin clean and dry  - Evaluate need for skin moisturizer/barrier cream  - Collaborate with interdisciplinary team   - Patient/family teaching  - Consider wound care consult   8/20/2020 0900 by Leesa Valadez RN  Outcome: Progressing  8/20/2020 0745 by Leesa Valadez RN  Outcome: Progressing     Problem: PAIN - ADULT  Goal: Verbalizes/displays adequate comfort level or baseline comfort level  Description: Interventions:  - Encourage patient to monitor pain and request assistance  - Assess pain using appropriate pain scale  - Administer analgesics based on type and severity of pain and evaluate response  - Implement non-pharmacological measures as appropriate and evaluate response  - Consider cultural and social influences on pain and pain management  - Notify physician/advanced practitioner if interventions unsuccessful or patient reports new pain  8/20/2020 0900 by Leesa Valadez RN  Outcome: Progressing  8/20/2020 0745 by Leesa Valadez RN  Outcome: Progressing     Problem: INFECTION - ADULT  Goal: Absence or prevention of progression during hospitalization  Description: INTERVENTIONS:  - Assess and monitor for signs and symptoms of infection  - Monitor lab/diagnostic results  - Monitor all insertion sites, i e  indwelling lines, tubes, and drains  - Monitor endotracheal if appropriate and nasal secretions for changes in amount and color  - [place] appropriate cooling/warming therapies per order  - Administer medications as ordered  - Instruct and encourage patient and family to use good hand hygiene technique  - Identify and instruct in appropriate isolation precautions for identified infection/condition  8/20/2020 0900 by Dawson Serna RN  Outcome: Progressing  8/20/2020 0745 by Dawson Serna RN  Outcome: Progressing  Goal: Absence of fever/infection during neutropenic period  Description: INTERVENTIONS:  - Monitor WBC    8/20/2020 0900 by Dawson Serna RN  Outcome: Progressing  8/20/2020 0745 by Dawson Serna RN  Outcome: Progressing     Problem: SAFETY ADULT  Goal: Patient will remain free of falls  Description: INTERVENTIONS:  - Assess patient frequently for physical needs  -  Identify cognitive and physical deficits and behaviors that affect risk of falls    -  [place] fall precautions as indicated by assessment   - Educate patient/family on patient safety including physical limitations  - Instruct patient to call for assistance with activity based on assessment  - Modify environment to reduce risk of injury  - Consider OT/PT consult to assist with strengthening/mobility  8/20/2020 0900 by Dawson Serna RN  Outcome: Progressing  8/20/2020 0745 by Dawson Serna RN  Outcome: Progressing  Goal: Maintain or return to baseline ADL function  Description: INTERVENTIONS:  -  Assess patient's ability to carry out ADLs; assess patient's baseline for ADL function and identify physical deficits which impact ability to perform ADLs (bathing, care of mouth/teeth, toileting, grooming, dressing, etc )  - Assess/evaluate cause of self-care deficits   - Assess range of motion  - Assess patient's mobility; develop plan if impaired  - Assess patient's need for assistive devices and provide as appropriate  - Encourage maximum independence but intervene and supervise when necessary  - Involve family in performance of ADLs  - Assess for home care needs following discharge   - Consider OT consult to assist with ADL evaluation and planning for discharge  - Provide patient education as appropriate  8/20/2020 0900 by Jodie Pepper RN  Outcome: Progressing  8/20/2020 0745 by Jodie Pepper RN  Outcome: Progressing  Goal: Maintain or return mobility status to optimal level  Description: INTERVENTIONS:  - Assess patient's baseline mobility status (ambulation, transfers, stairs, etc )    - Identify cognitive and physical deficits and behaviors that affect mobility  - Identify mobility aids required to assist with transfers and/or ambulation (gait belt, sit-to-stand, lift, walker, cane, etc )  - [place] fall precautions as indicated by assessment  - Record patient progress and toleration of activity level on Mobility SBAR; progress patient to next Phase/Stage  - Instruct patient to call for assistance with activity based on assessment  - Consider rehabilitation consult to assist with strengthening/weightbearing, etc   8/20/2020 0900 by Jodie Pepper RN  Outcome: Progressing  8/20/2020 0745 by Jodie Pepper RN  Outcome: Progressing     Problem: CARDIOVASCULAR - ADULT  Goal: Maintains optimal cardiac output and hemodynamic stability  Description: INTERVENTIONS:  - Monitor I/O, vital signs and rhythm  - Monitor for S/S and trends of decreased cardiac output  - Administer and titrate ordered vasoactive medications to optimize hemodynamic stability  - Assess quality of pulses, skin color and temperature  - Assess for signs of decreased coronary artery perfusion  - Instruct patient to report change in severity of symptoms  8/20/2020 0900 by Jodie Pepper RN  Outcome: Progressing  8/20/2020 0745 by Jodie Pepper RN  Outcome: Progressing  Goal: Absence of cardiac dysrhythmias or at baseline rhythm  Description: INTERVENTIONS:  - Continuous cardiac monitoring, vital signs, obtain 12 lead EKG if ordered  - Administer antiarrhythmic and heart rate control medications as ordered  - Monitor electrolytes and administer replacement therapy as ordered  8/20/2020 0900 by Jodie Pepper RN  Outcome: Progressing  8/20/2020 0745 by Jodie Pepper RN  Outcome: Progressing     Problem: METABOLIC, FLUID AND ELECTROLYTES - ADULT  Goal: Electrolytes maintained within normal limits  Description: INTERVENTIONS:  - Monitor labs and assess patient for signs and symptoms of electrolyte imbalances  - Administer electrolyte replacement as ordered  - Monitor response to electrolyte replacements, including repeat lab results as appropriate  - Instruct patient on fluid and nutrition as appropriate  8/20/2020 0900 by Jodie Pepper RN  Outcome: Progressing  8/20/2020 0745 by Jodie Pepper RN  Outcome: Progressing  Goal: Fluid balance maintained  Description: INTERVENTIONS:  - Monitor labs   - Monitor I/O and WT  - Instruct patient on fluid and nutrition as appropriate  - Assess for signs & symptoms of volume excess or deficit  8/20/2020 0900 by Jodie Pepper RN  Outcome: Progressing  8/20/2020 0745 by Jodie Pepper RN  Outcome: Progressing  Goal: Glucose maintained within target range  Description: INTERVENTIONS:  - Monitor Blood Glucose as ordered  - Assess for signs and symptoms of hyperglycemia and hypoglycemia  - Administer ordered medications to maintain glucose within target range  - Assess nutritional intake and initiate nutrition service referral as needed  8/20/2020 0900 by Jodie Pepper RN  Outcome: Progressing  8/20/2020 0745 by Jodie Pepper RN  Outcome: Progressing     Problem: HEMATOLOGIC - ADULT  Goal: Maintains hematologic stability  Description: INTERVENTIONS  - Assess for signs and symptoms of bleeding or hemorrhage  - Monitor labs  - Administer supportive blood products/factors as ordered and appropriate  8/20/2020 0900 by Jodie Pepper RN  Outcome: Progressing  8/20/2020 0745 by Jodie Pepper RN  Outcome: Progressing     Problem: Potential for Falls  Goal: Patient will remain free of falls  Description: INTERVENTIONS:  - Assess patient frequently for physical needs  -  Identify cognitive and physical deficits and behaviors that affect risk of falls    -  [place] fall precautions as indicated by assessment   - Educate patient/family on patient safety including physical limitations  - Instruct patient to call for assistance with activity based on assessment  - Modify environment to reduce risk of injury  - Consider OT/PT consult to assist with strengthening/mobility  8/20/2020 0900 by James Griffin RN  Outcome: Progressing  8/20/2020 0745 by James Griffin RN  Outcome: Progressing

## 2020-08-20 NOTE — PROGRESS NOTES
Transport arrived to take patient to ct scan  Morphine 4mg given at time of transport arrival  Patient refused to go for cat scan

## 2020-08-20 NOTE — ASSESSMENT & PLAN NOTE
· 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  Initially patient was admitted to [facility] from 07/15-blood culture were positive for MSSA bacteremia, but patient signed AMA on 07/22  Her repeat blood culture done on 7/21 was negative after 5 days  · Patient was admitted to [facility] on 07/30-blood culture came back positive for MSSA and she left again is medical advice on 08/04  Her blood cultures came back positive on 07/30, but the bacteremia cleared as of 8 /2  Patient left AMA on 08/04  · Patient was readmitted to [address] [place] [address] on 08/10-blood culture came back positive for Staph aureus  Currently on cefazolin per Infectious Disease  · 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 during the fast hospital stay from 7/15-7/22  · Continue with the antibiotics per Infectious Disease  · Repeat blood culture (pt has been refusing sticks extensive discussion had with the pt and the nursing staff to have the best person obtaining her blood)   · tmax 101  · CT surgery appreciated  · Patient also with previous history of MSSA bacteremia /possible endocarditis in 2018-    8/14-patient had 1 blood culture drawn yesterday; tentatively positive for staph, awaiting speciation is  Continue current treatment plan  - remains afebrile although tachycardic; will discuss with patient alone further cultures to be drawn tomorrow    8/15 - No vascular access prevented repeat draws this AM; will attempt again once vascular access is obtained    8/16-PICC line inserted yesterday; blood cultures x1 obtained and pending  As per ID, continue current antibiotics, some concern however given her fever overnight increasing white count and tachycardia; will defer to ID for possible adjustments in antibiotic therapy  8/17-1/1 bottle blood cultures negative times 24 hours  Will eventually need PICC line replaced in 24-48 hours    Case management report HOST consult pending; total of 6 week antibiotics needed clear bacteremia  8/18:  Blood cultures from 08/13 positive, blood cultures from 08/15 with no growth  Possibly replace PICC line tomorrow  8/19:  Blood cultures from 08/15 remained with no growth    8/20: Blood cultures from 08/15 remained with no growth  She is refusing repeat blood cultures today

## 2020-08-20 NOTE — QUICK NOTE
Called by nursing to evaluate  Pt reports that her pain is uncontrolled and requesting to speak to her physician  She reports pain with ambulation and difficulty getting to the bathroom  Per nursing report she had a BM earlier today on the bedpan  She reports she is upset about the pain management service changing her pain medications (decrease of morphine from q2h to q3h ) I explained that she has been over sedated and prior to this change  I explained again to her and her mother that worsening pain could indicate worsening abscesses in the right hip area could be present and she require surgical intervention  I recommended an urgent CT scan of the hip area to evaluate the abscesses  She continues to refuse until she is made pain free  I explained that she cannot be pain free until the abscesses are healed which will take time and antibiotic treatment and possible surgery if the abscesses worsened  I explained that her pain cannot be completely relieved at this time without sedating her to unconsciousness  I explained that she is currently receiving 6 pain medications (ketamine, oxycodone, robaxin, morphine, gabapentin, and lidoderm) at this time  I explained that due to her opioid addiction and current acute illness that complete pain relief is not possible and her complex pain issues are being managed by pain management specialists  I assured her and her mother that I will discuss with the management service who will continue to address this on a daily basis

## 2020-08-20 NOTE — PROGRESS NOTES
Progress Note - Infectious Disease   Francisco Crowe 34 y o  female MRN: [id number]  Unit/Bed#: [facility] 425-01 Encounter: [id number]      Impression/Plan:  1  Recurrent/persistent MSSA bacteremia with TV infective endocarditis   Blood cultures from 8/10 remain positive   Prolonged course of IV antibiotic was previously recommended, but patient has left AMA on 2 prior occasions (once at [facility] and once from [facility])    She remains hemodynamically stable despite her ongoing bacteremia  Repeat blood cultures negative thus far  She did not get the repeat blood cultures done yesterday  -continue cefazolin for now at current dose  -recheck blood cultures x2 sets  -follow up repeat blood cultures  -recheck CBC with diff and CMP  -if next set of blood cultures remain negative, can likely salvage PICC line as it does not appear to have been placed during a time of active bacteremia     2  Tricuspid valve endocarditis, 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  7/21 MELISSA showed large vegetation on TV with severe regurgitation  7/15 CT abdomen/pelvis also showed bilateral renal parenchymal abnormalities concerning for septic emboli   No intra-abdominal abscess     -antibiotic plan as above  -CT surgery is following      3  Right iliacus muscle abscesses-very small and unlikely to be able to be drained   At the level of the mid SI joint but the SI joint appears okay  Now with increasing pain of unclear significance    Perhaps the infection is now spread into the SI joint   -antibiotics as above  -pain management  -if worsened pain persists,  repeat imaging with either CT or MRI     4  Recent left foot cellulitis with abscess   Likely site of injection of IV drugs versus ectopic foci in the setting of MSSA bacteremia   This appears to have healed with no evidence of active infection   -antibiotic plan as above      5  Back pain   More likely secondary to chronic pain, opioid dependence   8/1/20 Thoracic and lumbar spine MRIs performed at [facility] negative were for abscess or osteomyelitis     -monitor back pain   -serial exams  -acute pain service follow-up     6  Active IV heroin abuse   This is the causative factor for development of bacteremia and infective endocarditis   Patient has left AMA on prior occasions   HIV screen negative  -patient is not a candidate for at home PICC line/IV antibiotics  -host evaluation if patient will agree  -acute pain service follow-up     7  Chronic HCV infection, transaminitis   Recent HIV was negative   The LFTs are waxing waning  -monitor LFTs     Discussed the above management plan with the primary service    Antibiotics:  Cefazolin restart 11    Subjective:  Patient has no fever, chills, sweats; no nausea, vomiting, diarrhea; no cough, shortness of breath; still having significant pain  No new symptoms  She is very upset this morning as she feels like her gluteal pain is getting worse  Objective:  Vitals:  Temp:  [98 1 °F (36 7 °C)-99 °F (37 2 °C)] 99 °F (37 2 °C)  HR:  [] 107  Resp:  [16-19] 18  BP: (112-132)/(63-82) 112/63  SpO2:  [94 %-97 %] 95 %  Temp (24hrs), Av 5 °F (36 9 °C), Min:98 1 °F (36 7 °C), Max:99 °F (37 2 °C)  Current: Temperature: 99 °F (37 2 °C)    Physical Exam:   General Appearance:  Alert, interactive, nontoxic, no acute distress  Throat: Oropharynx moist without lesions  Lungs:   Clear to auscultation bilaterally; no wheezes, rhonchi or rales; respirations unlabored   Heart:  Tachycardic; no murmur, rub or gallop   Abdomen:   Soft, non-tender, non-distended, positive bowel sounds  Extremities: No clubbing, cyanosis or edema   Skin: No new rashes or lesions  No draining wounds noted         Labs, Imaging, & Other studies:   All pertinent labs and imaging studies were personally reviewed  Results from last 7 days   Lab Units 20  0529 20  0427 20  0556   WBC Thousand/uL 8 50 10 22* 11 66*   HEMOGLOBIN g/dL 7 3* 7 4* 7 8*   PLATELETS Thousands/uL 350 368 393*     Results from last 7 days   Lab Units 08/18/20  0600 08/17/20  0427 08/16/20  0556  08/14/20  0943   SODIUM mmol/L 137 138 135*   < > 138   POTASSIUM mmol/L 4 1 4 5 4 5   < > 4 2   CHLORIDE mmol/L 105 105 103   < > 106   CO2 mmol/L 26 26 24   < > 26   BUN mg/dL 14 11 11   < > 10   CREATININE mg/dL 0 59* 0 53* 0 50*   < > 0 55*   EGFR ml/min/1 73sq m 126 131 133   < > 129   CALCIUM mg/dL 8 1* 8 3 8 5   < > 7 8*   AST U/L 118*  --  198*  --  45   ALT U/L 53  --  60  --  18   ALK PHOS U/L 145*  --  139*  --  141*    < > = values in this interval not displayed  Results from last 7 days   Lab Units 08/15/20  1547   BLOOD CULTURE  No Growth After 4 Days

## 2020-08-20 NOTE — PROGRESS NOTES
Progress Note - Kaylee Serrano 1990, 34 y o  female MRN: [id number]    Unit/Bed#: 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  Initially patient was admitted to [facility] from 07/15-blood culture were positive for MSSA bacteremia, but patient signed AMA on 07/22  Her repeat blood culture done on 7/21 was negative after 5 days  · Patient was admitted to [facility] on 07/30-blood culture came back positive for MSSA and she left again is medical advice on 08/04  Her blood cultures came back positive on 07/30, but the bacteremia cleared as of 8 /2  Patient left AMA on 08/04  · Patient was readmitted to [address] [place] [address] on 08/10-blood culture came back positive for Staph aureus  Currently on cefazolin per Infectious Disease  · 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 during the fast hospital stay from 7/15-7/22  · Continue with the antibiotics per Infectious Disease  · Repeat blood culture (pt has been refusing sticks extensive discussion had with the pt and the nursing staff to have the best person obtaining her blood)   · tmax 101  · CT surgery appreciated  · Patient also with previous history of MSSA bacteremia /possible endocarditis in 2018-    8/14-patient had 1 blood culture drawn yesterday; tentatively positive for staph, awaiting speciation is  Continue current treatment plan  - remains afebrile although tachycardic; will discuss with patient alone further cultures to be drawn tomorrow    8/15 - No vascular access prevented repeat draws this AM; will attempt again once vascular access is obtained    8/16-PICC line inserted yesterday; blood cultures x1 obtained and pending    As per ID, continue current antibiotics, some concern however given her fever overnight increasing white count and tachycardia; will defer to ID for possible adjustments in antibiotic therapy  8/17-1/1 bottle blood cultures negative times 24 hours  Will eventually need PICC line replaced in 24-48 hours  Case management report HOST consult pending; total of 6 week antibiotics needed clear bacteremia  8/18:  Blood cultures from 08/13 positive, blood cultures from 08/15 with no growth  Possibly replace PICC line tomorrow  8/19:  Blood cultures from 08/15 remained with no growth    8/20: Blood cultures from 08/15 remained with no growth  She is refusing repeat blood cultures today  Acute bacterial endocarditis  Assessment & Plan  · Patient noted to have tricuspid while vegetation on echocardiogram done in July  Patient had a transthoracic and also transesophageal echocardiogram done during the last hospital stay  · Recent CT of the abdomen and pelvis and also CT chest shows abnormalities concerning for septic emboli  · Antibiotics per Infectious Disease  · Patient is transferred over here to be evaluated by CT surgery  · - at the time of their evaluation pt is bilgerant and uncooperative reportedly due to pain  · - she was noncommittal to abstain from illegal drugs or unintended use of medications  · - recommendations to continue iv abx use   · - when she continues to abstain and completes iv abx treatment they would consider surgical correction   · - would benefit from neuropsych and psychiatry input    Intravenous drug abuse ([facility] [address] )  Assessment & Plan  · Patient with acute drug abuse and likely the source of bacteremia    · Patient has a history of signing against medical advise  · May benefit from drug rehab eventually  · During the hospital stay in July of this year patient was positive for [facility] ,cocaine and opiates    Back pain  Assessment & Plan  · Patient is complaining of severe back pain mainly in the lower part of the back and also in the sacral region  · Patient had MRI of the lumbar and thoracic spine during the recent hospital stay in [facility] which was unremarkable  · - continue robaxin 500 mg q 6  · - continue Toradol 30 mg q 6 hrs per acute pain for 5 days monitor renal function   · - continue oxy to 10 mg and 15 mg   · - continue iv morphine 4 mg to q3 hrs prn for breakthrough  · -continue tylenol atc pt can refuse (she states it makes her break out in profuse sweats)   · - continue lidocaine patch  · Iliacus muscle abscess is noted on CT scan, continue antibiotic treatment as above, no indication for drainage at this time  · Reports worsening pain today repeat CT scan ordered but when transport arrived she refused "can't you just reschedule it" "I need a break" I discussed that worsening pain could mean worsening septic emboli, involvement of the joint leading to major surgery  She insisted that the CT scan be delayed  Will try again this afternoon  Bipolar 1 disorder ([facility] [address] )  Assessment & Plan  · Patient refused Psychiatry and neuropsych evaluation, once pain more controlled will discuss again and have their input   · Supportive care    Septic embolism [facility])  Assessment & Plan  · Patient noted to have abnormalities seen in the CT of the chest abdomen and pelvis concerning for septic emboli  · There is pleural effusion on the CT scan of the chest and also on repeat chest x-ray  · Patient is rather asymptomatic from pulmonary standpoint  · Continue with the antibiotics  · Thoracic surgery have evaluated the pt and she is not short of breath     8/14-patient complaining of acute right hip pain; concerning for possible embolism  Consider imaging and will discuss with acute pain service  8/15-imaging right hip shows no osseous involvement; concern for proximity of abscess status SI joint, but no SI joint involvement at this time  Continue antibiotics monitor for improvement      Management as above under back pain        VTE Pharmacologic Prophylaxis:   Pharmacologic: Heparin  Mechanical VTE Prophylaxis in Place: Yes    Patient Centered Rounds: I have performed bedside rounds with nursing staff today  Discussions with Specialists or Other Care Team Provider: ID    Education and Discussions with Family / Patient: patient, discussed the possible complication of worsening RLE pain including increased abscess and joint involvement leading to surgery  She is insistent on delaying reimaging at this time  Time Spent for Care: 20 minutes  More than 50% of total time spent on counseling and coordination of care as described above  Current Length of Stay: 8 day(s)    Current Patient Status: Inpatient   Certification Statement: The patient will continue to require additional inpatient hospital stay due to continue endocarditis treatment    Discharge Plan: TBD    Code Status: Level 1 - Full Code      Subjective:   Reports worsening pain in RLE at hip, gluteal area  Trying to have a BM but reports severe leg pain limits her, refuses to use a bed pain or bedside commode  Denies any numbness or tingling  Objective:     Vitals:   Temp (24hrs), Av 5 °F (36 9 °C), Min:98 1 °F (36 7 °C), Max:99 °F (37 2 °C)    Temp:  [98 1 °F (36 7 °C)-99 °F (37 2 °C)] 99 °F (37 2 °C)  HR:  [] 107  Resp:  [16-19] 18  BP: (112-132)/(63-82) 112/63  SpO2:  [94 %-97 %] 95 %  Body mass index is 23 3 kg/m²  Input and Output Summary (last 24 hours): Intake/Output Summary (Last 24 hours) at 2020 1022  Last data filed at 2020 0601  Gross per 24 hour   Intake 669 95 ml   Output 1950 ml   Net -1280 05 ml       Physical Exam:     Physical Exam  Constitutional:       General: She is not in acute distress  Appearance: Normal appearance  She is not toxic-appearing  HENT:      Head: Normocephalic and atraumatic  Mouth/Throat:      Mouth: Mucous membranes are dry     Eyes:      Extraocular Movements: Extraocular movements intact  Pulmonary:      Effort: Pulmonary effort is normal    Musculoskeletal:         General: No swelling or deformity  Right lower leg: No edema  Left lower leg: No edema  Skin:     General: Skin is warm and dry  Neurological:      Mental Status: She is alert and oriented to person, place, and time  Comments: Raises leg and flexes at the hip spontaneously  Dorsiflexion and plantar flexion intact           Additional Data:     Labs:    Results from last 7 days   Lab Units 08/18/20  0529 08/17/20  0427   WBC Thousand/uL 8 50 10 22*   HEMOGLOBIN g/dL 7 3* 7 4*   HEMATOCRIT % 23 3* 23 8*   PLATELETS Thousands/uL 350 368   LYMPHO PCT %  --  20   MONO PCT %  --  7   EOS PCT %  --  0     Results from last 7 days   Lab Units 08/18/20  0600   SODIUM mmol/L 137   POTASSIUM mmol/L 4 1   CHLORIDE mmol/L 105   CO2 mmol/L 26   BUN mg/dL 14   CREATININE mg/dL 0 59*   ANION GAP mmol/L 6   CALCIUM mg/dL 8 1*   ALBUMIN g/dL 1 9*   TOTAL BILIRUBIN mg/dL 0 32   ALK PHOS U/L 145*   ALT U/L 53   AST U/L 118*   GLUCOSE RANDOM mg/dL 97                           * I Have Reviewed All Lab Data Listed Above  * Additional Pertinent Lab Tests Reviewed: All Labs Within Last 24 Hours Reviewed      Recent Cultures (last 7 days):     Results from last 7 days   Lab Units 08/15/20  1547   BLOOD CULTURE  No Growth After 4 Days         Last 24 Hours Medication List:   Current Facility-Administered Medications   Medication Dose Route Frequency Provider Last Rate    calcium carbonate  1,000 mg Oral Daily PRN Meera Jauregui MD      cefazolin  2,000 mg Intravenous Q8H Asha Roca PA-C 2,000 mg (08/20/20 0657)    enoxaparin  40 mg Subcutaneous Q24H [facility] Judge Carmen MD      gabapentin  200 mg Oral TID Luis Holland MD      glycopyrrolate  0 2 mg Intravenous Q4H PRN Jayme Lewis PA-C      haloperidol lactate  2 mg Intramuscular Q30 Min PRN Jayme Lewis PA-C      ketamine  0 2 mg/kg/hr Intravenous Continuous Darcy Nash PA-C 0 2 mg/kg/hr (08/19/20 9124)    lidocaine  2 patch Topical Daily Asha Roca PA-C      LORazepam  1 mg Intravenous Q1H PRN Darcy Nash PA-C      magnesium citrate  296 mL Oral Daily PRN Fabiola Miranda MD      melatonin  6 mg Oral HS PRN Asha Roca PA-C      methocarbamol  500 mg Oral Q6H [address] BOO Burger      mirtazapine  30 mg Oral HS Medina Yañez MD      morphine injection  4 mg Intravenous Q3H PRN Judge Mary MD      ondansetron  4 mg Intravenous Q6H PRN Medina Yañez MD      oxyCODONE  10 mg Oral Q4H PRN BOO Burger      oxyCODONE  15 mg Oral Q4H PRN BOO Burger      saccharomyces boulardii  250 mg Oral BID Fabiola Miranda MD      senna  1 tablet Oral BID Fabiola Miranda MD          Today, Patient Was Seen By: Ken Cade MD    ** Please Note: Dictation voice to text software may have been used in the creation of this document   **

## 2020-08-20 NOTE — ASSESSMENT & PLAN NOTE
· Patient noted to have tricuspid while vegetation on echocardiogram done in July  Patient had a transthoracic and also transesophageal echocardiogram done during the last hospital stay  · Recent CT of the abdomen and pelvis and also CT chest shows abnormalities concerning for septic emboli  · Antibiotics per Infectious Disease      · Patient is transferred over here to be evaluated by CT surgery  · - at the time of their evaluation pt is bilgerant and uncooperative reportedly due to pain  · - she was noncommittal to abstain from illegal drugs or unintended use of medications  · - recommendations to continue iv abx use   · - when she continues to abstain and completes iv abx treatment they would consider surgical correction   · - would benefit from neuropsych and psychiatry input

## 2020-08-20 NOTE — PROGRESS NOTES
Progress Note - Acute Pain Service    Margaret Severino 34 y o  female MRN: [id number]  Unit/Bed#: [facility] 425-01 Encounter: [id number]      Assessment:   Principal Problem:    Bacteremia due to Staphylococcus aureus  Active Problems:    Acute bacterial endocarditis    Septic embolism ([facility] [address] )    Bipolar 1 disorder (HCC)    Back pain    Intravenous drug abuse ([facility] )    Margaret Severino is a 34 y o  female  admitted with bacteremia and tricuspid valve endocarditis  Had right hip pain and found to have iliacus muscle abscess on the right  Plan:   - Morphine 4 mg IV q 3 hours p r n  breakthrough pain  Oxycodone 10 mg p o  q 4 hours p r n  moderate pain  Oxycodone 15 mg p o  q 4 hours p r n  severe pain  Ketamine infusion at 0 2 mg/kg/hr   - - Gabapentin 200 mg PO TID  - Robaxin 500 mg PO q6hrs   Lidocaine 5% patch, 2 patches to lower back and right hip    Senokot 8 6 mg p o  b i d  APS will continue to follow  Please call [phone] or [email or web address] Acute Pain Service - B (/ between [phone] and on weekends) with questions or concerns    Pain History  Current pain location(s):  Right hip  Pain Scale:   10/10  Quality:  Sharp  24 hour history:  Patient has had slowly improving right hip pain although continues to complain of 10/10 pain  This morning, attempted to get out of bed to use the bathroom and states that her pain increased significantly  Patient is scheduled for repeat CT of the right lower extremity including the right hip  Had long discussion with patient regarding her opioid use disorder  She states she started at a young age with Percocet and subsequently changed to heroin  She states she has been to inpatient rehab multiple times which has been ineffective and a joke "  She states that at rehab people only talk about drugs and sneak out to get high  She states that she has tried Suboxone and methadone in the past without significant success    She states her best success with staying clean has been when she quit on her own for 3 years  She is currently not interested in MAT or inpatient rehab but states that she is very motivated to stay clean for my daughter  Patient was advised that, should she consider rehab or MA T that she could discuss it with me and we can make arrangements  Patient then became angry and stated that it was because someone called children and youth on me " She states that she never used drugs around her children or had them in the house and that she never drove while under the influence with her children in the vehicle  She states that when I get bad my mother takes care of the children"  Explained to the patient that I do not know who called children and youth, that it is an anonymous phone call and that hospital employees are considered mandatory reporters  Also explained that children and youth will do an investigation and, if no problems are found, then no consequences will be forthcoming  Opioid requirement previous 24 hours:  Morphine 24 mg IV, oxycodone 100 mg p o      Meds/Allergies   all current active meds have been reviewed, current meds:   Current Facility-Administered Medications   Medication Dose Route Frequency    calcium carbonate (TUMS) chewable tablet 1,000 mg  1,000 mg Oral Daily PRN    ceFAZolin (ANCEF) IVPB (premix) 2,000 mg 50 mL  2,000 mg Intravenous Q8H    enoxaparin (LOVENOX) subcutaneous injection 40 mg  40 mg Subcutaneous Q24H [facility]    gabapentin (NEURONTIN) capsule 200 mg  200 mg Oral TID    glycopyrrolate (ROBINUL) injection 0 2 mg  0 2 mg Intravenous Q4H PRN    haloperidol lactate (HALDOL) injection 2 mg  2 mg Intramuscular Q30 Min PRN    ketamine 250 mg (STANDARD CONCENTRATION) IV in sodium chloride 0 9% 250 mL  0 2 mg/kg/hr Intravenous Continuous    lidocaine (LIDODERM) 5 % patch 2 patch  2 patch Topical Daily    LORazepam (ATIVAN) injection 1 mg  1 mg Intravenous Q1H PRN    magnesium citrate (CITROMA) oral solution 296 mL  296 mL Oral Daily PRN    melatonin tablet 6 mg  6 mg Oral HS PRN    methocarbamol (ROBAXIN) tablet 500 mg  500 mg Oral Q6H [address]    mirtazapine (REMERON) tablet 30 mg  30 mg Oral HS    morphine (PF) 4 mg/mL injection 4 mg  4 mg Intravenous Q3H PRN    ondansetron (ZOFRAN) injection 4 mg  4 mg Intravenous Q6H PRN    oxyCODONE (ROXICODONE) immediate release tablet 10 mg  10 mg Oral Q4H PRN    oxyCODONE (ROXICODONE) IR tablet 15 mg  15 mg Oral Q4H PRN    saccharomyces boulardii (FLORASTOR) capsule 250 mg  250 mg Oral BID    senna (SENOKOT) tablet 8 6 mg  1 tablet Oral BID    and PTA meds:   None       Allergies   Allergen Reactions    Cat Hair Extract     Dog Epithelium     Latex     Pollen Extract        Objective     Temp:  [98 1 °F (36 7 °C)-99 °F (37 2 °C)] 99 °F (37 2 °C)  HR:  [] 107  Resp:  [16-19] 18  BP: (112-132)/(63-82) 112/63    Physical Exam  Vitals signs and nursing note reviewed  Constitutional:       General: She is awake  She is not in acute distress  Eyes:      Pupils: Pupils are equal, round, and reactive to light  Cardiovascular:      Rate and Rhythm: Normal rate and regular rhythm  Musculoskeletal:      Right hip: She exhibits decreased range of motion and tenderness  She exhibits no crepitus and no deformity  Skin:     General: Skin is warm and dry  Neurological:      General: No focal deficit present  Mental Status: She is alert and oriented to person, place, and time  GCS: GCS eye subscore is 4  GCS verbal subscore is 5  GCS motor subscore is 6  Psychiatric:         Behavior: Behavior is cooperative           Lab Results:   Results from last 7 days   Lab Units 08/18/20  0529   WBC Thousand/uL 8 50   HEMOGLOBIN g/dL 7 3*   HEMATOCRIT % 23 3*   PLATELETS Thousands/uL 350      Results from last 7 days   Lab Units 08/18/20  0600   POTASSIUM mmol/L 4 1   CHLORIDE mmol/L 105   CO2 mmol/L 26   BUN mg/dL 14   CREATININE mg/dL 0 59* CALCIUM mg/dL 8 1*   ALK PHOS U/L 145*   ALT U/L 53   AST U/L 118*       Imaging Studies: I have personally reviewed pertinent reports  EKG, Pathology, and Other Studies: I have personally reviewed pertinent reports  Counseling / Coordination of Care  Total floor / unit time spent today 30 minutes  Greater than 50% of total time was spent with the patient and / or family counseling and / or coordination of care  A description of the counseling / coordination of care:  Patient interview, physical examination, review of medical record, review of imaging and laboratory data, development of pain management plan, discussion of pain management plan with patient, nursing and primary service, discussion of options for opioid use disorder treatment       Please note that the APS provides consultative services regarding pain management only  With the exception of ketamine and epidural infusions and except when indicated, final decisions regarding starting or changing doses of analgesic medications are at the discretion of the consulting service  Off hours consultation and/or medication management is generally not available      Jasson Pizarro PA-C  Acute Pain Service

## 2020-08-20 NOTE — ASSESSMENT & PLAN NOTE
· Patient is complaining of severe back pain mainly in the lower part of the back and also in the sacral region  · Patient had MRI of the lumbar and thoracic spine during the recent hospital stay in [facility] which was unremarkable  · - continue robaxin 500 mg q 6  · - continue Toradol 30 mg q 6 hrs per acute pain for 5 days monitor renal function   · - continue oxy to 10 mg and 15 mg   · - continue iv morphine 4 mg to q3 hrs prn for breakthrough  · -continue tylenol atc pt can refuse (she states it makes her break out in profuse sweats)   · - continue lidocaine patch  · Iliacus muscle abscess is noted on CT scan, continue antibiotic treatment as above, no indication for drainage at this time  · Reports worsening pain today repeat CT scan ordered but when transport arrived she refused "can't you just reschedule it" "I need a break" I discussed that worsening pain could mean worsening septic emboli, involvement of the joint leading to major surgery  She insisted that the CT scan be delayed  Will try again this afternoon

## 2020-08-20 NOTE — PLAN OF CARE
Problem: Prexisting or High Potential for Compromised Skin Integrity  Goal: Skin integrity is maintained or improved  Description: INTERVENTIONS:  - Identify patients at risk for skin breakdown  - Assess and monitor skin integrity  - Assess and monitor nutrition and hydration status  - Monitor labs   - Assess for incontinence   - Turn and reposition patient  - Assist with mobility/ambulation  - Relieve pressure over bony prominences  - Avoid friction and shearing  - Provide appropriate hygiene as needed including keeping skin clean and dry  - Evaluate need for skin moisturizer/barrier cream  - Collaborate with interdisciplinary team   - Patient/family teaching  - Consider wound care consult   Outcome: Progressing     Problem: PAIN - ADULT  Goal: Verbalizes/displays adequate comfort level or baseline comfort level  Description: Interventions:  - Encourage patient to monitor pain and request assistance  - Assess pain using appropriate pain scale  - Administer analgesics based on type and severity of pain and evaluate response  - Implement non-pharmacological measures as appropriate and evaluate response  - Consider cultural and social influences on pain and pain management  - Notify physician/advanced practitioner if interventions unsuccessful or patient reports new pain  Outcome: Progressing     Problem: INFECTION - ADULT  Goal: Absence or prevention of progression during hospitalization  Description: INTERVENTIONS:  - Assess and monitor for signs and symptoms of infection  - Monitor lab/diagnostic results  - Monitor all insertion sites, i e  indwelling lines, tubes, and drains  - Monitor endotracheal if appropriate and nasal secretions for changes in amount and color  - [place] appropriate cooling/warming therapies per order  - Administer medications as ordered  - Instruct and encourage patient and family to use good hand hygiene technique  - Identify and instruct in appropriate isolation precautions for identified infection/condition  Outcome: Progressing  Goal: Absence of fever/infection during neutropenic period  Description: INTERVENTIONS:  - Monitor WBC    Outcome: Progressing     Problem: SAFETY ADULT  Goal: Patient will remain free of falls  Description: INTERVENTIONS:  - Assess patient frequently for physical needs  -  Identify cognitive and physical deficits and behaviors that affect risk of falls    -  [place] fall precautions as indicated by assessment   - Educate patient/family on patient safety including physical limitations  - Instruct patient to call for assistance with activity based on assessment  - Modify environment to reduce risk of injury  - Consider OT/PT consult to assist with strengthening/mobility  Outcome: Progressing  Goal: Maintain or return to baseline ADL function  Description: INTERVENTIONS:  -  Assess patient's ability to carry out ADLs; assess patient's baseline for ADL function and identify physical deficits which impact ability to perform ADLs (bathing, care of mouth/teeth, toileting, grooming, dressing, etc )  - Assess/evaluate cause of self-care deficits   - Assess range of motion  - Assess patient's mobility; develop plan if impaired  - Assess patient's need for assistive devices and provide as appropriate  - Encourage maximum independence but intervene and supervise when necessary  - Involve family in performance of ADLs  - Assess for home care needs following discharge   - Consider OT consult to assist with ADL evaluation and planning for discharge  - Provide patient education as appropriate  Outcome: Progressing  Goal: Maintain or return mobility status to optimal level  Description: INTERVENTIONS:  - Assess patient's baseline mobility status (ambulation, transfers, stairs, etc )    - Identify cognitive and physical deficits and behaviors that affect mobility  - Identify mobility aids required to assist with transfers and/or ambulation (gait belt, sit-to-stand, lift, walker, cane, etc )  - [place] fall precautions as indicated by assessment  - Record patient progress and toleration of activity level on Mobility SBAR; progress patient to next Phase/Stage  - Instruct patient to call for assistance with activity based on assessment  - Consider rehabilitation consult to assist with strengthening/weightbearing, etc   Outcome: Progressing     Problem: CARDIOVASCULAR - ADULT  Goal: Maintains optimal cardiac output and hemodynamic stability  Description: INTERVENTIONS:  - Monitor I/O, vital signs and rhythm  - Monitor for S/S and trends of decreased cardiac output  - Administer and titrate ordered vasoactive medications to optimize hemodynamic stability  - Assess quality of pulses, skin color and temperature  - Assess for signs of decreased coronary artery perfusion  - Instruct patient to report change in severity of symptoms  Outcome: Progressing  Goal: Absence of cardiac dysrhythmias or at baseline rhythm  Description: INTERVENTIONS:  - Continuous cardiac monitoring, vital signs, obtain 12 lead EKG if ordered  - Administer antiarrhythmic and heart rate control medications as ordered  - Monitor electrolytes and administer replacement therapy as ordered  Outcome: Progressing     Problem: METABOLIC, FLUID AND ELECTROLYTES - ADULT  Goal: Electrolytes maintained within normal limits  Description: INTERVENTIONS:  - Monitor labs and assess patient for signs and symptoms of electrolyte imbalances  - Administer electrolyte replacement as ordered  - Monitor response to electrolyte replacements, including repeat lab results as appropriate  - Instruct patient on fluid and nutrition as appropriate  Outcome: Progressing  Goal: Fluid balance maintained  Description: INTERVENTIONS:  - Monitor labs   - Monitor I/O and WT  - Instruct patient on fluid and nutrition as appropriate  - Assess for signs & symptoms of volume excess or deficit  Outcome: Progressing  Goal: Glucose maintained within target range  Description: INTERVENTIONS:  - Monitor Blood Glucose as ordered  - Assess for signs and symptoms of hyperglycemia and hypoglycemia  - Administer ordered medications to maintain glucose within target range  - Assess nutritional intake and initiate nutrition service referral as needed  Outcome: Progressing     Problem: HEMATOLOGIC - ADULT  Goal: Maintains hematologic stability  Description: INTERVENTIONS  - Assess for signs and symptoms of bleeding or hemorrhage  - Monitor labs  - Administer supportive blood products/factors as ordered and appropriate  Outcome: Progressing     Problem: Potential for Falls  Goal: Patient will remain free of falls  Description: INTERVENTIONS:  - Assess patient frequently for physical needs  -  Identify cognitive and physical deficits and behaviors that affect risk of falls    -  [place] fall precautions as indicated by assessment   - Educate patient/family on patient safety including physical limitations  - Instruct patient to call for assistance with activity based on assessment  - Modify environment to reduce risk of injury  - Consider OT/PT consult to assist with strengthening/mobility  Outcome: Progressing

## 2020-08-20 NOTE — ASSESSMENT & PLAN NOTE
· Patient with acute drug abuse and likely the source of bacteremia    · Patient has a history of signing against medical advise  · May benefit from drug rehab eventually  · During the hospital stay in July of this year patient was positive for [facility] ,cocaine and opiates

## 2020-08-20 NOTE — ASSESSMENT & PLAN NOTE
· Patient noted to have abnormalities seen in the CT of the chest abdomen and pelvis concerning for septic emboli  · There is pleural effusion on the CT scan of the chest and also on repeat chest x-ray  · Patient is rather asymptomatic from pulmonary standpoint  · Continue with the antibiotics  · Thoracic surgery have evaluated the pt and she is not short of breath     8/14-patient complaining of acute right hip pain; concerning for possible embolism  Consider imaging and will discuss with acute pain service  8/15-imaging right hip shows no osseous involvement; concern for proximity of abscess status SI joint, but no SI joint involvement at this time  Continue antibiotics monitor for improvement      Management as above under back pain

## 2020-08-21 ENCOUNTER — APPOINTMENT (INPATIENT)
Dept: RADIOLOGY | Facility: HOSPITAL | Age: 30
DRG: 163 | End: 2020-08-21
Payer: COMMERCIAL

## 2020-08-21 LAB
ALBUMIN SERPL BCP-MCNC: 2.2 G/DL (ref 3.5–5)
ALP SERPL-CCNC: 161 U/L (ref 46–116)
ALT SERPL W P-5'-P-CCNC: 52 U/L (ref 12–78)
ANION GAP SERPL CALCULATED.3IONS-SCNC: 7 MMOL/L (ref 4–13)
AST SERPL W P-5'-P-CCNC: 78 U/L (ref 5–45)
BASOPHILS # BLD AUTO: 0.03 THOUSANDS/ΜL (ref 0–0.1)
BASOPHILS NFR BLD AUTO: 0 % (ref 0–1)
BILIRUB SERPL-MCNC: 0.2 MG/DL (ref 0.2–1)
BUN SERPL-MCNC: 12 MG/DL (ref 5–25)
CALCIUM SERPL-MCNC: 8.9 MG/DL (ref 8.3–10.1)
CHLORIDE SERPL-SCNC: 101 MMOL/L (ref 100–108)
CO2 SERPL-SCNC: 29 MMOL/L (ref 21–32)
CREAT SERPL-MCNC: 0.5 MG/DL (ref 0.6–1.3)
EOSINOPHIL # BLD AUTO: 0.16 THOUSAND/ΜL (ref 0–0.61)
EOSINOPHIL NFR BLD AUTO: 2 % (ref 0–6)
ERYTHROCYTE [DISTWIDTH] IN BLOOD BY AUTOMATED COUNT: 14.6 % (ref 11.6–15.1)
GFR SERPL CREATININE-BSD FRML MDRD: 133 ML/MIN/1.73SQ M
GLUCOSE SERPL-MCNC: 105 MG/DL (ref 65–140)
HCT VFR BLD AUTO: 25.5 % (ref 34.8–46.1)
HGB BLD-MCNC: 7.9 G/DL (ref 11.5–15.4)
IMM GRANULOCYTES # BLD AUTO: 0.12 THOUSAND/UL (ref 0–0.2)
IMM GRANULOCYTES NFR BLD AUTO: 1 % (ref 0–2)
LYMPHOCYTES # BLD AUTO: 2.77 THOUSANDS/ΜL (ref 0.6–4.47)
LYMPHOCYTES NFR BLD AUTO: 32 % (ref 14–44)
MCH RBC QN AUTO: 26.3 PG (ref 26.8–34.3)
MCHC RBC AUTO-ENTMCNC: 31 G/DL (ref 31.4–37.4)
MCV RBC AUTO: 85 FL (ref 82–98)
MONOCYTES # BLD AUTO: 0.58 THOUSAND/ΜL (ref 0.17–1.22)
MONOCYTES NFR BLD AUTO: 7 % (ref 4–12)
NEUTROPHILS # BLD AUTO: 5.04 THOUSANDS/ΜL (ref 1.85–7.62)
NEUTS SEG NFR BLD AUTO: 58 % (ref 43–75)
NRBC BLD AUTO-RTO: 0 /100 WBCS
PLATELET # BLD AUTO: 478 THOUSANDS/UL (ref 149–390)
PMV BLD AUTO: 8.7 FL (ref 8.9–12.7)
POTASSIUM SERPL-SCNC: 4.1 MMOL/L (ref 3.5–5.3)
PROT SERPL-MCNC: 8.3 G/DL (ref 6.4–8.2)
RBC # BLD AUTO: 3 MILLION/UL (ref 3.81–5.12)
SODIUM SERPL-SCNC: 137 MMOL/L (ref 136–145)
WBC # BLD AUTO: 8.7 THOUSAND/UL (ref 4.31–10.16)

## 2020-08-21 PROCEDURE — 85025 COMPLETE CBC W/AUTO DIFF WBC: CPT | Performed by: INTERNAL MEDICINE

## 2020-08-21 PROCEDURE — 99232 SBSQ HOSP IP/OBS MODERATE 35: CPT | Performed by: INTERNAL MEDICINE

## 2020-08-21 PROCEDURE — 73701 CT LOWER EXTREMITY W/DYE: CPT

## 2020-08-21 PROCEDURE — 99233 SBSQ HOSP IP/OBS HIGH 50: CPT | Performed by: PHYSICIAN ASSISTANT

## 2020-08-21 PROCEDURE — 80053 COMPREHEN METABOLIC PANEL: CPT | Performed by: INTERNAL MEDICINE

## 2020-08-21 RX ORDER — HYDROMORPHONE HCL/PF 1 MG/ML
1 SYRINGE (ML) INJECTION ONCE
Status: COMPLETED | OUTPATIENT
Start: 2020-08-21 | End: 2020-08-21

## 2020-08-21 RX ORDER — GABAPENTIN 300 MG/1
300 CAPSULE ORAL 3 TIMES DAILY
Status: DISCONTINUED | OUTPATIENT
Start: 2020-08-21 | End: 2020-08-26

## 2020-08-21 RX ORDER — METHOCARBAMOL 750 MG/1
750 TABLET, FILM COATED ORAL EVERY 6 HOURS SCHEDULED
Status: DISCONTINUED | OUTPATIENT
Start: 2020-08-21 | End: 2020-09-09

## 2020-08-21 RX ADMIN — OXYCODONE HYDROCHLORIDE 15 MG: 10 TABLET ORAL at 15:25

## 2020-08-21 RX ADMIN — GABAPENTIN 300 MG: 300 CAPSULE ORAL at 15:24

## 2020-08-21 RX ADMIN — METHOCARBAMOL TABLETS 750 MG: 750 TABLET, COATED ORAL at 18:50

## 2020-08-21 RX ADMIN — LORAZEPAM 1 MG: 2 INJECTION INTRAMUSCULAR; INTRAVENOUS at 18:10

## 2020-08-21 RX ADMIN — CEFAZOLIN SODIUM 2000 MG: 2 SOLUTION INTRAVENOUS at 15:25

## 2020-08-21 RX ADMIN — LORAZEPAM 1 MG: 2 INJECTION INTRAMUSCULAR; INTRAVENOUS at 19:49

## 2020-08-21 RX ADMIN — LORAZEPAM 1 MG: 2 INJECTION INTRAMUSCULAR; INTRAVENOUS at 08:07

## 2020-08-21 RX ADMIN — Medication 0.2 MG/KG/HR: at 19:05

## 2020-08-21 RX ADMIN — OXYCODONE HYDROCHLORIDE 15 MG: 10 TABLET ORAL at 18:51

## 2020-08-21 RX ADMIN — MORPHINE SULFATE 4 MG: 4 INJECTION INTRAVENOUS at 23:59

## 2020-08-21 RX ADMIN — MELATONIN 6 MG: at 23:59

## 2020-08-21 RX ADMIN — GABAPENTIN 300 MG: 300 CAPSULE ORAL at 21:14

## 2020-08-21 RX ADMIN — CEFAZOLIN SODIUM 2000 MG: 2 SOLUTION INTRAVENOUS at 23:58

## 2020-08-21 RX ADMIN — LORAZEPAM 1 MG: 2 INJECTION INTRAMUSCULAR; INTRAVENOUS at 15:24

## 2020-08-21 RX ADMIN — MIRTAZAPINE 30 MG: 30 TABLET, FILM COATED ORAL at 21:28

## 2020-08-21 RX ADMIN — MORPHINE SULFATE 4 MG: 4 INJECTION INTRAVENOUS at 18:12

## 2020-08-21 RX ADMIN — METHOCARBAMOL TABLETS 750 MG: 750 TABLET, COATED ORAL at 23:10

## 2020-08-21 RX ADMIN — LORAZEPAM 1 MG: 2 INJECTION INTRAMUSCULAR; INTRAVENOUS at 12:13

## 2020-08-21 RX ADMIN — MORPHINE SULFATE 4 MG: 4 INJECTION INTRAVENOUS at 12:12

## 2020-08-21 RX ADMIN — CEFAZOLIN SODIUM 2000 MG: 2 SOLUTION INTRAVENOUS at 08:20

## 2020-08-21 RX ADMIN — ALTEPLASE 2 MG: 2.2 INJECTION, POWDER, LYOPHILIZED, FOR SOLUTION INTRAVENOUS at 23:13

## 2020-08-21 RX ADMIN — ENOXAPARIN SODIUM 40 MG: 40 INJECTION SUBCUTANEOUS at 08:20

## 2020-08-21 RX ADMIN — OXYCODONE HYDROCHLORIDE 15 MG: 10 TABLET ORAL at 23:09

## 2020-08-21 RX ADMIN — LORAZEPAM 1 MG: 2 INJECTION INTRAMUSCULAR; INTRAVENOUS at 05:06

## 2020-08-21 RX ADMIN — MORPHINE SULFATE 4 MG: 4 INJECTION INTRAVENOUS at 03:47

## 2020-08-21 RX ADMIN — OXYCODONE HYDROCHLORIDE 15 MG: 10 TABLET ORAL at 00:39

## 2020-08-21 RX ADMIN — HYDROMORPHONE HYDROCHLORIDE 1 MG: 1 INJECTION, SOLUTION INTRAMUSCULAR; INTRAVENOUS; SUBCUTANEOUS at 11:40

## 2020-08-21 RX ADMIN — LORAZEPAM 1 MG: 2 INJECTION INTRAMUSCULAR; INTRAVENOUS at 23:09

## 2020-08-21 RX ADMIN — Medication 250 MG: at 18:50

## 2020-08-21 RX ADMIN — SENNOSIDES 8.6 MG: 8.6 TABLET ORAL at 18:50

## 2020-08-21 RX ADMIN — MORPHINE SULFATE 4 MG: 4 INJECTION INTRAVENOUS at 07:15

## 2020-08-21 RX ADMIN — METHOCARBAMOL 500 MG: 500 TABLET, FILM COATED ORAL at 05:06

## 2020-08-21 RX ADMIN — Medication 250 MG: at 08:20

## 2020-08-21 RX ADMIN — HYDROMORPHONE HYDROCHLORIDE 1 MG: 1 INJECTION, SOLUTION INTRAMUSCULAR; INTRAVENOUS; SUBCUTANEOUS at 21:12

## 2020-08-21 RX ADMIN — OXYCODONE HYDROCHLORIDE 15 MG: 10 TABLET ORAL at 05:06

## 2020-08-21 RX ADMIN — GABAPENTIN 200 MG: 100 CAPSULE ORAL at 08:20

## 2020-08-21 RX ADMIN — LORAZEPAM 1 MG: 2 INJECTION INTRAMUSCULAR; INTRAVENOUS at 21:28

## 2020-08-21 RX ADMIN — METHOCARBAMOL 500 MG: 500 TABLET, FILM COATED ORAL at 12:13

## 2020-08-21 RX ADMIN — LORAZEPAM 1 MG: 2 INJECTION INTRAMUSCULAR; INTRAVENOUS at 00:39

## 2020-08-21 NOTE — ASSESSMENT & PLAN NOTE
· Patient with acute drug abuse and likely the source of bacteremia    · Patient has a history of signing against medical advise  · May benefit from drug rehab eventually  · During the hospital stay in July of this year patient was positive for [facility] ,cocaine and opiates

## 2020-08-21 NOTE — PROGRESS NOTES
Progress Note - Infectious Disease   Ernestine Avilez 34 y o  female MRN: [id number]  Unit/Bed#: [facility] 425-01 Encounter: [id number]      Impression/Plan:  1  Recurrent/persistent MSSA bacteremia with TV infective endocarditis   Blood cultures from 8/10 remain positive   Prolonged course of IV antibiotic was previously recommended, but patient has left AMA on 2 prior occasions (once at [facility] and once from [facility])    She remains hemodynamically stable despite her ongoing bacteremia  Repeat blood cultures negative thus far  She did not get the repeat blood cultures done yesterday  -continue cefazolin for now at current dose  -recheck blood cultures x2 sets if patient will allow  -follow up repeat blood cultures  -recheck CBC with diff and CMP  -plan to replace PICC line next week if patient continues to refuse repeat blood cultures confirm follow-up blood cultures negative     2  Tricuspid valve endocarditis, 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  7/21 MELISSA showed large vegetation on TV with severe regurgitation  7/15 CT abdomen/pelvis also showed bilateral renal parenchymal abnormalities concerning for septic emboli   No intra-abdominal abscess     -antibiotic plan as above  -CT surgery is following      3  Right iliacus muscle abscesses-very small and unlikely to be able to be drained   At the level of the mid SI joint but the SI joint appears okay  Now with increasing pain of unclear significance  Perhaps the infection is now spread into the SI joint although this is not overtly seen on CT scan    Her increased pain may be more related to the decreased morphine dose   -antibiotics as above  -pain management  -if worsened pain persists, consider CT the abdomen pelvis to look for evidence of iliopsoas abscess     4  Recent left foot cellulitis with abscess   Likely site of injection of IV drugs versus ectopic foci in the setting of MSSA bacteremia   This appears to have healed with no evidence of active infection   -antibiotic plan as above      5  Back pain   More likely secondary to chronic pain, opioid dependence   20 Thoracic and lumbar spine MRIs performed at [facility] negative were for abscess or osteomyelitis     -monitor back pain   -serial exams  -acute pain service follow-up     6  Active IV heroin abuse   This is the causative factor for development of bacteremia and infective endocarditis   Patient has left AMA on prior occasions   HIV screen negative  -patient is not a candidate for at home PICC line/IV antibiotics  -acute pain service follow-up     7  Chronic HCV infection, transaminitis   Recent HIV was negative   The LFTs are waxing waning  -monitor LFTs     Discussed the above management plan in detail with the primary service    Will see the patient again 2020  Please call if questions  Antibiotics:  Cefazolin restart 12    Subjective:  Patient has no fever, chills, sweats; no nausea, vomiting, diarrhea; no cough, shortness of breath; still having some significant pain that has increased since yesterday  Her morphine dose was slightly decreased yesterday by acute pain service  No new symptoms  Objective:  Vitals:  Temp:  [97 6 °F (36 4 °C)-98 9 °F (37 2 °C)] 98 9 °F (37 2 °C)  HR:  [] 107  Resp:  [18] 18  BP: (107-120)/(61-68) 114/62  SpO2:  [94 %-97 %] 96 %  Temp (24hrs), Av 4 °F (36 9 °C), Min:97 6 °F (36 4 °C), Max:98 9 °F (37 2 °C)  Current: Temperature: 98 9 °F (37 2 °C)    Physical Exam:   General Appearance:  Alert, interactive, nontoxic, no acute distress  Throat: Oropharynx moist without lesions  Lungs:   Clear to auscultation bilaterally; no wheezes, rhonchi or rales; respirations unlabored   Heart:  Tachycardic; no murmur, rub or gallop   Abdomen:   Soft, non-tender, non-distended, positive bowel sounds  Extremities: No clubbing, cyanosis or edema   Skin: No new rashes or lesions  No draining wounds noted         Labs, Imaging, & Other studies:   All pertinent labs and imaging studies were personally reviewed  Results from last 7 days   Lab Units 08/21/20  0459 08/18/20  0529 08/17/20  0427   WBC Thousand/uL 8 70 8 50 10 22*   HEMOGLOBIN g/dL 7 9* 7 3* 7 4*   PLATELETS Thousands/uL 478* 350 368     Results from last 7 days   Lab Units 08/21/20  0459 08/18/20  0600 08/17/20  0427 08/16/20  0556   SODIUM mmol/L 137 137 138 135*   POTASSIUM mmol/L 4 1 4 1 4 5 4 5   CHLORIDE mmol/L 101 105 105 103   CO2 mmol/L 29 26 26 24   BUN mg/dL 12 14 11 11   CREATININE mg/dL 0 50* 0 59* 0 53* 0 50*   EGFR ml/min/1 73sq m 133 126 131 133   CALCIUM mg/dL 8 9 8 1* 8 3 8 5   AST U/L 78* 118*  --  198*   ALT U/L 52 53  --  60   ALK PHOS U/L 161* 145*  --  139*     Results from last 7 days   Lab Units 08/15/20  1547   BLOOD CULTURE  No Growth After 5 Days          CT right lower extremity-decreased size of small collection in the right iliacus muscle    Images personally reviewed by me in PACS

## 2020-08-21 NOTE — PHYSICAL THERAPY NOTE
Physical Therapy Cancellation Note      PT order received; chart reviewed; noted pt is off the floor for (R) hip CT; will follow as clinical course allows      Tony Vo, PT

## 2020-08-21 NOTE — PROGRESS NOTES
Progress Note - Margaret Severino 1990, 34 y o  female MRN: [id number]    Unit/Bed#: [facility] 425-01 Encounter: [id number]    Primary Care Provider: Dale Govea PA-C   Date and time admitted to hospital: 8/12/2020  7:39 PM        * Bacteremia due to Staphylococcus aureus  Assessment & Plan  · Recurrent MSSA bacteremia  · Initially admitted to [facility] from 07/15-blood culture were positive for MSSA bacteremia, but patient signed AMA on 07/22  Her repeat blood culture done on 7/21 was negative after 5 days  · [facility] on 07/30-blood culture came back positive for MSSA and she left again is medical advice on 08/04  Her blood cultures came back positive on 07/30, but the bacteremia cleared as of 8 /2  Patient left AMA on 08/04  · Readmitted to [address] [place] [address] on 08/10-blood culture came back positive for Staph aureus  · 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 during the fast hospital stay from 7/15-7/22  · Plan is to continue IV cefazolin for a 6 week course per ID recomendations    Acute bacterial endocarditis  Assessment & Plan  · Patient noted to have tricuspid while vegetation on echocardiogram done in July  Patient had a transthoracic and also transesophageal echocardiogram done during the last hospital stay    · With septic emboli to lungs and posterior iliacus muscle  · Patient is transferred over here to be evaluated by CT surgery  · She was noncommittal to abstain from illegal drugs or unintended use of medications  · When she continues to abstain and completes iv abx treatment they would consider surgical correction   · Continue IV cefazolin as above    Septic embolism ([address] )  Assessment & Plan  · Patient noted to have abnormalities seen in the CT of the chest abdomen and pelvis concerning for septic emboli  · There is pleural effusion on the CT scan of the chest and also on repeat chest x-ray  · Patient is rather asymptomatic from pulmonary standpoint  · Continue with the antibiotics  · Thoracic surgery have evaluated the pt and she is not short of breath   · 8/15-imaging right hip shows no osseous involvement; concern for proximity of abscess status SI joint, but no SI joint involvement at this time  · Management as above under back pain    Back pain  Assessment & Plan  · Patient is complaining of severe back pain mainly in the lower part of the back and also in the sacral region  · Patient had MRI of the lumbar and thoracic spine during the recent hospital stay in [facility] which was unremarkable  · - increase robaxin to 750 mg q 6  · - continue oxy to 10 mg and 15 mg   · - continue iv morphine 4 mg to q3 hrs prn for breakthrough  · -continue tylenol atc pt can refuse (she states it makes her break out in profuse sweats)   · - continue lidocaine patch  · -increase gabapentin to 300mg TID  · Iliacus muscle abscess is noted on CT scan, continue antibiotic treatment as above, no indication for drainage at this time  · Repeat CT scan this morning shows improvement in septic emboli to iliacus muscle    Intravenous drug abuse ([facility] [address] )  Assessment & Plan  · Patient with acute drug abuse and likely the source of bacteremia  · Patient has a history of signing against medical advise  · May benefit from drug rehab eventually  · During the hospital stay in July of this year patient was positive for [facility] ,cocaine and opiates    Bipolar 1 disorder ([address] )  Assessment & Plan  · Patient refused Psychiatry and neuropsych evaluation, once pain more controlled will discuss again and have their input   · Supportive care        VTE Pharmacologic Prophylaxis:   Pharmacologic: Heparin  Mechanical VTE Prophylaxis in Place: Yes    Patient Centered Rounds: I have performed bedside rounds with nursing staff today      Discussions with Specialists or Other Care Team Provider: ID, acute pain service    Education and Discussions with Family / Patient: patient, plan of care    Time Spent for Care: 20 minutes  More than 50% of total time spent on counseling and coordination of care as described above  Current Length of Stay: 9 day(s)    Current Patient Status: Inpatient   Certification Statement: The patient will continue to require additional inpatient hospital stay due to complete antibiotic course    Discharge Plan: TBD    Code Status: Level 1 - Full Code      Subjective:   Reports uncontrolled pain in right hip and demanding more pain medications  +BM yesterday    Objective:     Vitals:   Temp (24hrs), Av 4 °F (36 9 °C), Min:97 6 °F (36 4 °C), Max:98 9 °F (37 2 °C)    Temp:  [97 6 °F (36 4 °C)-98 9 °F (37 2 °C)] 98 9 °F (37 2 °C)  HR:  [] 107  Resp:  [18] 18  BP: (114-120)/(62-68) 114/62  SpO2:  [96 %-97 %] 96 %  Body mass index is 23 3 kg/m²  Input and Output Summary (last 24 hours): Intake/Output Summary (Last 24 hours) at 2020 1446  Last data filed at 2020 1022  Gross per 24 hour   Intake 594 15 ml   Output 1875 ml   Net -1280 85 ml       Physical Exam:     Physical Exam  Constitutional:       General: She is not in acute distress  Appearance: Normal appearance  HENT:      Head: Normocephalic and atraumatic  Mouth/Throat:      Mouth: Mucous membranes are moist       Pharynx: Oropharynx is clear  Eyes:      Extraocular Movements: Extraocular movements intact  Neurological:      Mental Status: She is alert and oriented to person, place, and time        Comments: Intact flexion at knee, dorsiflexion and plantar flexion of RLE           Additional Data:     Labs:    Results from last 7 days   Lab Units 20  0459   WBC Thousand/uL 8 70   HEMOGLOBIN g/dL 7 9*   HEMATOCRIT % 25 5*   PLATELETS Thousands/uL 478*   NEUTROS PCT % 58   LYMPHS PCT % 32   MONOS PCT % 7   EOS PCT % 2     Results from last 7 days   Lab Units 20  0459   SODIUM mmol/L 137   POTASSIUM mmol/L 4 1   CHLORIDE mmol/L 101   CO2 mmol/L 29 BUN mg/dL 12   CREATININE mg/dL 0 50*   ANION GAP mmol/L 7   CALCIUM mg/dL 8 9   ALBUMIN g/dL 2 2*   TOTAL BILIRUBIN mg/dL 0 20   ALK PHOS U/L 161*   ALT U/L 52   AST U/L 78*   GLUCOSE RANDOM mg/dL 105                           * I Have Reviewed All Lab Data Listed Above  * Additional Pertinent Lab Tests Reviewed: All Labs Within Last 24 Hours Reviewed    Imaging:    Imaging Reports Reviewed Today Include: CT scan    Recent Cultures (last 7 days):     Results from last 7 days   Lab Units 08/15/20  1547   BLOOD CULTURE  No Growth After 5 Days         Last 24 Hours Medication List:   Current Facility-Administered Medications   Medication Dose Route Frequency Provider Last Rate    calcium carbonate  1,000 mg Oral Daily PRN Dimas Polk MD      cefazolin  2,000 mg Intravenous Q8H Asha Roca PA-C 2,000 mg (08/21/20 0820)    enoxaparin  40 mg Subcutaneous Q24H [address] Loretta White MD      gabapentin  300 mg Oral TID Ky Hdz MD      glycopyrrolate  0 2 mg Intravenous Q4H PRN Doraelias Land, PA-NALLELY      haloperidol lactate  2 mg Intramuscular Q30 Min PRN Dora Land, PA-C      ketamine  0 2 mg/kg/hr Intravenous Continuous Dora Shanda, PA-C 0 2 mg/kg/hr (08/20/20 1900)    lidocaine  2 patch Topical Daily Asha Roca PA-C      LORazepam  1 mg Intravenous Q1H PRN Dora Land, PA-C      magnesium citrate  296 mL Oral Daily PRN Loretta White MD      melatonin  6 mg Oral HS PRN Asha Roca PA-C      methocarbamol  750 mg Oral Q6H [address] Ky Hdz MD      mirtazapine  30 mg Oral HS Dimas Polk MD      morphine injection  4 mg Intravenous Q3H PRN Ky Hdz MD      ondansetron  4 mg Intravenous Q6H PRN Dimas Polk MD      oxyCODONE  10 mg Oral Q4H PRN Tyler Soulier, CRNP      oxyCODONE  15 mg Oral Q4H PRN Tyler Soulier, CRNP      saccharomyces boulardii  250 mg Oral BID Loretta White MD      senna  1 tablet Oral BID Loretta White MD          Today, Patient Was Seen By: Liu Davies MD    ** Please Note: Dictation voice to text software may have been used in the creation of this document   **

## 2020-08-21 NOTE — ASSESSMENT & PLAN NOTE
· Patient noted to have abnormalities seen in the CT of the chest abdomen and pelvis concerning for septic emboli  · There is pleural effusion on the CT scan of the chest and also on repeat chest x-ray  · Patient is rather asymptomatic from pulmonary standpoint  · Continue with the antibiotics  · Thoracic surgery have evaluated the pt and she is not short of breath   · 8/15-imaging right hip shows no osseous involvement; concern for proximity of abscess status SI joint, but no SI joint involvement at this time     · Management as above under back pain

## 2020-08-21 NOTE — SOCIAL WORK
Pt remains hospitalized for ongoing infusion of IV ABX for her Endocarditis  Pt is unable to be safely d/c'd home w/ a [facility] to provide pt w/ home care and home infusion of her IV ABX  This is due to pt's hx of IV Heroin abuse and the high liability she could use the port site for her IV ABX instead for IV street drugs  Pt has also made statements to staff that she has no desire to stop using Heroin, and has declined offers for help via referral to the HOST program, stating her "recreational" Heroin use is not a problem  This disqualifies her for the STAR D&A program as well, which is designed for pt's in her situation, but who are legitimately interested in receiving help  Pt will remain hospitalized for several more weeks until her IV ABX regiment is complete  CM to follow

## 2020-08-21 NOTE — PROGRESS NOTES
Progress Note - Acute Pain Service    Yari Olsen 34 y o  female MRN: [id number]  Unit/Bed#: [facility] 425-01 Encounter: [id number]      Assessment:   Principal Problem:    Bacteremia due to Staphylococcus aureus  Active Problems:    Acute bacterial endocarditis    Septic embolism (HCC)    Bipolar 1 disorder (HCC)    Back pain    Intravenous drug abuse ([facility] [address] )      Plan:   · Will give 1 mg IV Dilaudid x1 now to assess for effectiveness  · Continue ketamine infusion at 0 2 mg/kg/hr  · Continue oxycodone 10 mg p o  q 4 hours p r n  moderate pain  · Continue oxycodone 15 mg p o  q 4 hours p r n  severe pain  · Continue morphine 4 mg IV q 3 hours p r n  breakthrough pain  · Increase gabapentin to 300 mg p o  t i d  scheduled  · Increase methocarbamol to 750 mg p o  q 6 hours scheduled  · Continue lidocaine 5% patch, 2 patches to lower back and extremities for 12 hours daily  · Continue bowel regimen to avoid opioid induced constipation  Patient has had a recent bowel movement  · Patient remains dissatisfied with her pain control  Had long discussion with patient regarding challenges involved in controlling her pain secondary to opioid tolerance and dependence  APS will continue to follow; please contact APS ( [phone]) with any further questions    Pain History  Current pain location(s):  Right hip  Pain Scale:   10 out 10  Quality:  Sharp  24 hour history:  Patient continues to have significant right hip pain despite the current pain regimen  Had repeat CT scan of the right hip showing improvement in previously seen iliacus muscle abscesses however suggests possibility of septic arthritis of right SI joint  Patient states her right hip pain is worse this morning after being manipulated for her CT scan and states that she will require sedation for planned MRI this weekend    Suggested a patient that had we rotate opioids to Dilaudid in place of morphine however patient states that Dilaudid has previously been ineffective at 1 mg dosing  Agreed to try a single dose of 1 mg IV Dilaudid to check for effectiveness currently      Opioid requirement previous 24 hours:  Dilaudid 1 mg IV, Morphine 20 mg IV, oxycodone 75 mg p o     Meds/Allergies   all current active meds have been reviewed, current meds:   Current Facility-Administered Medications   Medication Dose Route Frequency    calcium carbonate (TUMS) chewable tablet 1,000 mg  1,000 mg Oral Daily PRN    ceFAZolin (ANCEF) IVPB (premix) 2,000 mg 50 mL  2,000 mg Intravenous Q8H    enoxaparin (LOVENOX) subcutaneous injection 40 mg  40 mg Subcutaneous Q24H JEFF    gabapentin (NEURONTIN) capsule 200 mg  200 mg Oral TID    glycopyrrolate (ROBINUL) injection 0 2 mg  0 2 mg Intravenous Q4H PRN    haloperidol lactate (HALDOL) injection 2 mg  2 mg Intramuscular Q30 Min PRN    ketamine 250 mg (STANDARD CONCENTRATION) IV in sodium chloride 0 9% 250 mL  0 2 mg/kg/hr Intravenous Continuous    lidocaine (LIDODERM) 5 % patch 2 patch  2 patch Topical Daily    LORazepam (ATIVAN) injection 1 mg  1 mg Intravenous Q1H PRN    magnesium citrate (CITROMA) oral solution 296 mL  296 mL Oral Daily PRN    melatonin tablet 6 mg  6 mg Oral HS PRN    methocarbamol (ROBAXIN) tablet 500 mg  500 mg Oral Q6H [address]    mirtazapine (REMERON) tablet 30 mg  30 mg Oral HS    morphine (PF) 4 mg/mL injection 4 mg  4 mg Intravenous Q3H PRN    ondansetron (ZOFRAN) injection 4 mg  4 mg Intravenous Q6H PRN    oxyCODONE (ROXICODONE) immediate release tablet 10 mg  10 mg Oral Q4H PRN    oxyCODONE (ROXICODONE) IR tablet 15 mg  15 mg Oral Q4H PRN    saccharomyces boulardii (FLORASTOR) capsule 250 mg  250 mg Oral BID    senna (SENOKOT) tablet 8 6 mg  1 tablet Oral BID    and PTA meds:   None       Allergies   Allergen Reactions    Cat Hair Extract     Dog Epithelium     Latex     Pollen Extract        Objective     Temp:  [97 6 °F (36 4 °C)-98 9 °F (37 2 °C)] 98 9 °F (37 2 °C)  HR: [] 107  Resp:  [18] 18  BP: (114-120)/(62-68) 114/62    Physical Exam  Vitals signs and nursing note reviewed  Constitutional:       General: She is awake  She is in acute distress  Eyes:      Pupils: Pupils are equal, round, and reactive to light  Cardiovascular:      Rate and Rhythm: Normal rate and regular rhythm  Musculoskeletal:      Right hip: She exhibits decreased range of motion, tenderness and bony tenderness  She exhibits no crepitus and no deformity  Skin:     General: Skin is warm and dry  Neurological:      General: No focal deficit present  Mental Status: She is alert and oriented to person, place, and time  GCS: GCS eye subscore is 4  GCS verbal subscore is 5  GCS motor subscore is 6  Psychiatric:         Behavior: Behavior is cooperative  Lab Results:   Results from last 7 days   Lab Units 08/21/20  0459   WBC Thousand/uL 8 70   HEMOGLOBIN g/dL 7 9*   HEMATOCRIT % 25 5*   PLATELETS Thousands/uL 478*      Results from last 7 days   Lab Units 08/21/20  0459   POTASSIUM mmol/L 4 1   CHLORIDE mmol/L 101   CO2 mmol/L 29   BUN mg/dL 12   CREATININE mg/dL 0 50*   CALCIUM mg/dL 8 9   ALK PHOS U/L 161*   ALT U/L 52   AST U/L 78*       Imaging Studies: I have personally reviewed pertinent reports  EKG, Pathology, and Other Studies: I have personally reviewed pertinent reports  Counseling / Coordination of Care  Total floor / unit time spent today 30 minutes  Greater than 50% of total time was spent with the patient and / or family counseling and / or coordination of care  A description of the counseling / coordination of care:  Patient interview, physical examination, review of medical record, review of imaging and laboratory data, development of pain management plan, discussion of pain management plan with patient, nursing and primary service      Jin Fam PA-C

## 2020-08-21 NOTE — PROGRESS NOTES
Pt unsatisfied with pain management and sleep medications  States she has not gotten any sleep tonight, yelling, banging on table, and ripped off tele saying she wont wear it

## 2020-08-21 NOTE — ASSESSMENT & PLAN NOTE
· Patient is complaining of severe back pain mainly in the lower part of the back and also in the sacral region  · Patient had MRI of the lumbar and thoracic spine during the recent hospital stay in [facility] which was unremarkable      · - increase robaxin to 750 mg q 6  · - continue oxy to 10 mg and 15 mg   · - continue iv morphine 4 mg to q3 hrs prn for breakthrough  · -continue tylenol atc pt can refuse (she states it makes her break out in profuse sweats)   · - continue lidocaine patch  · -increase gabapentin to 300mg TID  · Iliacus muscle abscess is noted on CT scan, continue antibiotic treatment as above, no indication for drainage at this time  · Repeat CT scan this morning shows improvement in septic emboli to iliacus muscle

## 2020-08-21 NOTE — ASSESSMENT & PLAN NOTE
· Patient noted to have tricuspid while vegetation on echocardiogram done in July  Patient had a transthoracic and also transesophageal echocardiogram done during the last hospital stay    · With septic emboli to lungs and posterior iliacus muscle  · Patient is transferred over here to be evaluated by CT surgery  · She was noncommittal to abstain from illegal drugs or unintended use of medications  · When she continues to abstain and completes iv abx treatment they would consider surgical correction   · Continue IV cefazolin as above

## 2020-08-21 NOTE — ASSESSMENT & PLAN NOTE
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

## 2020-08-22 PROCEDURE — 97163 PT EVAL HIGH COMPLEX 45 MIN: CPT

## 2020-08-22 PROCEDURE — 99232 SBSQ HOSP IP/OBS MODERATE 35: CPT | Performed by: ANESTHESIOLOGY

## 2020-08-22 PROCEDURE — 99232 SBSQ HOSP IP/OBS MODERATE 35: CPT | Performed by: INTERNAL MEDICINE

## 2020-08-22 RX ORDER — LORAZEPAM 2 MG/ML
2 INJECTION INTRAMUSCULAR
Status: DISCONTINUED | OUTPATIENT
Start: 2020-08-22 | End: 2020-09-02

## 2020-08-22 RX ADMIN — MORPHINE SULFATE 4 MG: 4 INJECTION INTRAVENOUS at 10:53

## 2020-08-22 RX ADMIN — MORPHINE SULFATE 4 MG: 4 INJECTION INTRAVENOUS at 22:44

## 2020-08-22 RX ADMIN — MORPHINE SULFATE 4 MG: 4 INJECTION INTRAVENOUS at 20:02

## 2020-08-22 RX ADMIN — LORAZEPAM 1 MG: 2 INJECTION INTRAMUSCULAR; INTRAVENOUS at 04:08

## 2020-08-22 RX ADMIN — GABAPENTIN 300 MG: 300 CAPSULE ORAL at 10:53

## 2020-08-22 RX ADMIN — CEFAZOLIN SODIUM 2000 MG: 2 SOLUTION INTRAVENOUS at 10:53

## 2020-08-22 RX ADMIN — OXYCODONE HYDROCHLORIDE 15 MG: 10 TABLET ORAL at 18:10

## 2020-08-22 RX ADMIN — SENNOSIDES 8.6 MG: 8.6 TABLET ORAL at 18:11

## 2020-08-22 RX ADMIN — METHOCARBAMOL TABLETS 750 MG: 750 TABLET, COATED ORAL at 12:05

## 2020-08-22 RX ADMIN — MELATONIN 6 MG: at 22:44

## 2020-08-22 RX ADMIN — LORAZEPAM 1 MG: 2 INJECTION INTRAMUSCULAR; INTRAVENOUS at 12:05

## 2020-08-22 RX ADMIN — GABAPENTIN 300 MG: 300 CAPSULE ORAL at 15:56

## 2020-08-22 RX ADMIN — MIRTAZAPINE 30 MG: 30 TABLET, FILM COATED ORAL at 22:44

## 2020-08-22 RX ADMIN — LORAZEPAM 1 MG: 2 INJECTION INTRAMUSCULAR; INTRAVENOUS at 06:46

## 2020-08-22 RX ADMIN — GABAPENTIN 300 MG: 300 CAPSULE ORAL at 20:02

## 2020-08-22 RX ADMIN — MORPHINE SULFATE 4 MG: 4 INJECTION INTRAVENOUS at 06:19

## 2020-08-22 RX ADMIN — OXYCODONE HYDROCHLORIDE 15 MG: 10 TABLET ORAL at 04:04

## 2020-08-22 RX ADMIN — METHOCARBAMOL TABLETS 750 MG: 750 TABLET, COATED ORAL at 23:03

## 2020-08-22 RX ADMIN — METHOCARBAMOL TABLETS 750 MG: 750 TABLET, COATED ORAL at 06:18

## 2020-08-22 RX ADMIN — LORAZEPAM 1 MG: 2 INJECTION INTRAMUSCULAR; INTRAVENOUS at 02:14

## 2020-08-22 RX ADMIN — METHOCARBAMOL TABLETS 750 MG: 750 TABLET, COATED ORAL at 18:10

## 2020-08-22 RX ADMIN — ENOXAPARIN SODIUM 40 MG: 40 INJECTION SUBCUTANEOUS at 10:53

## 2020-08-22 RX ADMIN — LORAZEPAM 2 MG: 2 INJECTION INTRAMUSCULAR; INTRAVENOUS at 20:01

## 2020-08-22 RX ADMIN — Medication 0.2 MG/KG/HR: at 13:22

## 2020-08-22 RX ADMIN — Medication 250 MG: at 10:53

## 2020-08-22 RX ADMIN — LORAZEPAM 1 MG: 2 INJECTION INTRAMUSCULAR; INTRAVENOUS at 10:52

## 2020-08-22 RX ADMIN — CEFAZOLIN SODIUM 2000 MG: 2 SOLUTION INTRAVENOUS at 23:56

## 2020-08-22 RX ADMIN — LORAZEPAM 2 MG: 2 INJECTION INTRAMUSCULAR; INTRAVENOUS at 22:44

## 2020-08-22 RX ADMIN — CEFAZOLIN SODIUM 2000 MG: 2 SOLUTION INTRAVENOUS at 15:56

## 2020-08-22 RX ADMIN — MORPHINE SULFATE 4 MG: 4 INJECTION INTRAVENOUS at 15:55

## 2020-08-22 RX ADMIN — Medication 250 MG: at 18:10

## 2020-08-22 RX ADMIN — LORAZEPAM 2 MG: 2 INJECTION INTRAMUSCULAR; INTRAVENOUS at 15:55

## 2020-08-22 RX ADMIN — OXYCODONE HYDROCHLORIDE 15 MG: 10 TABLET ORAL at 12:05

## 2020-08-22 NOTE — PLAN OF CARE
Problem: PHYSICAL THERAPY ADULT  Goal: Performs mobility at highest level of function for planned discharge setting  See evaluation for individualized goals  Description: Treatment/Interventions: Functional transfer training, LE strengthening/ROM, Therapeutic exercise, Endurance training, Patient/family training, Equipment eval/education, Bed mobility, Spoke to nursing  Equipment Recommended: (TBD)       See flowsheet documentation for full assessment, interventions and recommendations  Note: Prognosis: Guarded  Problem List: Decreased strength, Decreased endurance, Impaired balance, Decreased mobility, Pain  Assessment: Pt is 34 y o  female seen for PT evaluation s/p admit to [facility] on 8/12/2020 w/ Bacteremia due to Staphylococcus aureus  Pt transferred to [facility] for CT surgery consult for recurrent bacteremia  Pt has been admitted to several hospital since 7/15/20 and has left AMA  Pt reports LBP/R hip pain  CT demonstrated "2 small abscesses in the right posterior iliacus muscle approximately at the level of the upper to mid SI joint" on 8/15/20  Repeat CT on 8/21/20 demonstrates "Interval decrease in size of small collections within the right iliacus muscle"  PT consulted to assess pt's functional mobility and d/c needs  Order placed for PT eval and tx, w/ ambulate patient order  Comorbidities affecting pt's physical performance at time of assessment include: bacteremia, endocarditis, septic embolism Bipolar disorders, IV drug abuse, abscess L foot  PTA, pt was independent w/ all functional mobility w/ no AD and lives w/ mother, stepdad, brother, and 2 y/o daughter in two level house w/ 2 MARY and FF to bed/bath on 2nd floor  Pt reports 1/2 bath on main level   Personal factors affecting pt at time of IE include: inaccessible home environment, lives in two story house, stairs to enter home, inability to ambulate household distances, inability to navigate community distances, decreased initiation and engagement, limited insight into impairments and inability to perform IADLs  Please find objective findings from PT assessment regarding body systems outlined above with impairments and limitations including weakness, decreased ROM, impaired balance, decreased endurance, gait deviations, pain, decreased activity tolerance, decreased functional mobility tolerance and fall risk  Pt lying supine upon PT arrival  Pt performed rolling to L at supervision  Pt able to move LEs off side of bed w/out assistance  Pt given Min A at trunk to obtain seated position- pt only able to obtain ~75% upright trunk position and then impulsively returned supine 2' significant pain levels in RLE  Pt reports not wanting to use full effort "just to sit on a commode because then I'll be in pain and screaming all day" Pt not willing to trial SPT to commode as pt only wants to ambulate to an actual toilet but reports too much pain when sitting EOB  The following objective measures performed on IE also reveal limitations: Modified [place]: 4 (moderate/severe disability)  Pt's clinical presentation is currently unstable/unpredictable seen in pt's presentation of recent admission for bacteremia  Barriers to Discharge: Inaccessible home environment     PT Discharge Recommendation: Post-Acute Rehabilitation Services(pending progress)          See flowsheet documentation for full assessment

## 2020-08-22 NOTE — ASSESSMENT & PLAN NOTE
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

## 2020-08-22 NOTE — PLAN OF CARE
Problem: Prexisting or High Potential for Compromised Skin Integrity  Goal: Skin integrity is maintained or improved  Description: INTERVENTIONS:  - Identify patients at risk for skin breakdown  - Assess and monitor skin integrity  - Assess and monitor nutrition and hydration status  - Monitor labs   - Assess for incontinence   - Turn and reposition patient  - Assist with mobility/ambulation  - Relieve pressure over bony prominences  - Avoid friction and shearing  - Provide appropriate hygiene as needed including keeping skin clean and dry  - Evaluate need for skin moisturizer/barrier cream  - Collaborate with interdisciplinary team   - Patient/family teaching  - Consider wound care consult   Outcome: Progressing     Problem: PAIN - ADULT  Goal: Verbalizes/displays adequate comfort level or baseline comfort level  Description: Interventions:  - Encourage patient to monitor pain and request assistance  - Assess pain using appropriate pain scale  - Administer analgesics based on type and severity of pain and evaluate response  - Implement non-pharmacological measures as appropriate and evaluate response  - Consider cultural and social influences on pain and pain management  - Notify physician/advanced practitioner if interventions unsuccessful or patient reports new pain  Outcome: Progressing     Problem: INFECTION - ADULT  Goal: Absence or prevention of progression during hospitalization  Description: INTERVENTIONS:  - Assess and monitor for signs and symptoms of infection  - Monitor lab/diagnostic results  - Monitor all insertion sites, i e  indwelling lines, tubes, and drains  - Monitor endotracheal if appropriate and nasal secretions for changes in amount and color  - [place] appropriate cooling/warming therapies per order  - Administer medications as ordered  - Instruct and encourage patient and family to use good hand hygiene technique  - Identify and instruct in appropriate isolation precautions for identified infection/condition  Outcome: Progressing  Goal: Absence of fever/infection during neutropenic period  Description: INTERVENTIONS:  - Monitor WBC    Outcome: Progressing     Problem: SAFETY ADULT  Goal: Patient will remain free of falls  Description: INTERVENTIONS:  - Assess patient frequently for physical needs  -  Identify cognitive and physical deficits and behaviors that affect risk of falls    -  [place] fall precautions as indicated by assessment   - Educate patient/family on patient safety including physical limitations  - Instruct patient to call for assistance with activity based on assessment  - Modify environment to reduce risk of injury  - Consider OT/PT consult to assist with strengthening/mobility  Outcome: Progressing  Goal: Maintain or return to baseline ADL function  Description: INTERVENTIONS:  -  Assess patient's ability to carry out ADLs; assess patient's baseline for ADL function and identify physical deficits which impact ability to perform ADLs (bathing, care of mouth/teeth, toileting, grooming, dressing, etc )  - Assess/evaluate cause of self-care deficits   - Assess range of motion  - Assess patient's mobility; develop plan if impaired  - Assess patient's need for assistive devices and provide as appropriate  - Encourage maximum independence but intervene and supervise when necessary  - Involve family in performance of ADLs  - Assess for home care needs following discharge   - Consider OT consult to assist with ADL evaluation and planning for discharge  - Provide patient education as appropriate  Outcome: Progressing  Goal: Maintain or return mobility status to optimal level  Description: INTERVENTIONS:  - Assess patient's baseline mobility status (ambulation, transfers, stairs, etc )    - Identify cognitive and physical deficits and behaviors that affect mobility  - Identify mobility aids required to assist with transfers and/or ambulation (gait belt, sit-to-stand, lift, walker, cane, etc )  - [place] fall precautions as indicated by assessment  - Record patient progress and toleration of activity level on Mobility SBAR; progress patient to next Phase/Stage  - Instruct patient to call for assistance with activity based on assessment  - Consider rehabilitation consult to assist with strengthening/weightbearing, etc   Outcome: Progressing     Problem: CARDIOVASCULAR - ADULT  Goal: Maintains optimal cardiac output and hemodynamic stability  Description: INTERVENTIONS:  - Monitor I/O, vital signs and rhythm  - Monitor for S/S and trends of decreased cardiac output  - Administer and titrate ordered vasoactive medications to optimize hemodynamic stability  - Assess quality of pulses, skin color and temperature  - Assess for signs of decreased coronary artery perfusion  - Instruct patient to report change in severity of symptoms  Outcome: Progressing  Goal: Absence of cardiac dysrhythmias or at baseline rhythm  Description: INTERVENTIONS:  - Continuous cardiac monitoring, vital signs, obtain 12 lead EKG if ordered  - Administer antiarrhythmic and heart rate control medications as ordered  - Monitor electrolytes and administer replacement therapy as ordered  Outcome: Progressing     Problem: METABOLIC, FLUID AND ELECTROLYTES - ADULT  Goal: Electrolytes maintained within normal limits  Description: INTERVENTIONS:  - Monitor labs and assess patient for signs and symptoms of electrolyte imbalances  - Administer electrolyte replacement as ordered  - Monitor response to electrolyte replacements, including repeat lab results as appropriate  - Instruct patient on fluid and nutrition as appropriate  Outcome: Progressing  Goal: Fluid balance maintained  Description: INTERVENTIONS:  - Monitor labs   - Monitor I/O and WT  - Instruct patient on fluid and nutrition as appropriate  - Assess for signs & symptoms of volume excess or deficit  Outcome: Progressing  Goal: Glucose maintained within target range  Description: INTERVENTIONS:  - Monitor Blood Glucose as ordered  - Assess for signs and symptoms of hyperglycemia and hypoglycemia  - Administer ordered medications to maintain glucose within target range  - Assess nutritional intake and initiate nutrition service referral as needed  Outcome: Progressing     Problem: HEMATOLOGIC - ADULT  Goal: Maintains hematologic stability  Description: INTERVENTIONS  - Assess for signs and symptoms of bleeding or hemorrhage  - Monitor labs  - Administer supportive blood products/factors as ordered and appropriate  Outcome: Progressing     Problem: Potential for Falls  Goal: Patient will remain free of falls  Description: INTERVENTIONS:  - Assess patient frequently for physical needs  -  Identify cognitive and physical deficits and behaviors that affect risk of falls  -  [place] fall precautions as indicated by assessment   - Educate patient/family on patient safety including physical limitations  - Instruct patient to call for assistance with activity based on assessment  - Modify environment to reduce risk of injury  - Consider OT/PT consult to assist with strengthening/mobility  Outcome: Progressing     Problem: Nutrition/Hydration-ADULT  Goal: Nutrient/Hydration intake appropriate for improving, restoring or maintaining nutritional needs  Description: Monitor and assess patient's nutrition/hydration status for malnutrition  Collaborate with interdisciplinary team and initiate plan and interventions as ordered  Monitor patient's weight and dietary intake as ordered or per policy  Utilize nutrition screening tool and intervene as necessary  Determine patient's food preferences and provide high-protein, high-caloric foods as appropriate       INTERVENTIONS:  - Monitor oral intake, urinary output, labs, and treatment plans  - Assess nutrition and hydration status and recommend course of action  - Evaluate amount of meals eaten  - Assist patient with eating if necessary - Allow adequate time for meals  - Recommend/ encourage appropriate diets, oral nutritional supplements, and vitamin/mineral supplements  - Order, calculate, and assess calorie counts as needed  - Recommend, monitor, and adjust tube feedings and TPN/PPN based on assessed needs  - Assess need for intravenous fluids  - Provide specific nutrition/hydration education as appropriate  - Include patient/family/caregiver in decisions related to nutrition  Outcome: Progressing

## 2020-08-22 NOTE — ASSESSMENT & PLAN NOTE
· Patient is complaining of severe back pain mainly in the lower part of the back and also in the sacral region  · Patient had MRI of the lumbar and thoracic spine during the recent hospital stay in [facility] which was unremarkable      · - increase robaxin to 750 mg q 6  · - continue oxy to 10 mg and 15 mg   · - continue iv morphine 4 mg to q3 hrs prn for breakthrough  · -continue tylenol atc pt can refuse (she states it makes her break out in profuse sweats)   · - continue lidocaine patch  · -increase gabapentin to 300mg TID  · Iliacus muscle abscess is noted on CT scan, continue antibiotic treatment as above, no indication for drainage at this time  · Repeat CT scan shows improvement in septic emboli to iliacus muscle  · Possible MRI with sedation on Wednesday due to severe claustrophobia

## 2020-08-22 NOTE — PROGRESS NOTES
Progress Note - Rosario Lopez 1990, 34 y o  female MRN: [id number]    Unit/Bed#: [facility] 425-01 Encounter: [id number]    Primary Care Provider: Mary Walker PA-C   Date and time admitted to hospital: 8/12/2020  7:39 PM        * Bacteremia due to Staphylococcus aureus  Assessment & Plan  · Recurrent MSSA bacteremia  · Initially admitted to [facility] from 07/15-blood culture were positive for MSSA bacteremia, but patient signed AMA on 07/22  Her repeat blood culture done on 7/21 was negative after 5 days  · [facility] on 07/30-blood culture came back positive for MSSA and she left again is medical advice on 08/04  Her blood cultures came back positive on 07/30, but the bacteremia cleared as of 8 /2  Patient left AMA on 08/04  · Readmitted to [address] [place] [address] on 08/10-blood culture came back positive for Staph aureus  · 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 during the fast hospital stay from 7/15-7/22  · Plan is to continue IV cefazolin for a 6 week course per ID recommendations  · Agreeable to repeat blood cultures today    Acute bacterial endocarditis  Assessment & Plan  · Patient noted to have tricuspid while vegetation on echocardiogram done in July  Patient had a transthoracic and also transesophageal echocardiogram done during the last hospital stay    · With septic emboli to lungs and posterior iliacus muscle  · Patient is transferred over here to be evaluated by CT surgery  · She was noncommittal to abstain from illegal drugs or unintended use of medications  · When she continues to abstain and completes iv abx treatment they would consider surgical correction   · Continue IV cefazolin as above    Septic embolism ([address] )  Assessment & Plan  · Patient noted to have abnormalities seen in the CT of the chest abdomen and pelvis concerning for septic emboli  · There is pleural effusion on the CT scan of the chest and also on repeat chest x-ray  · Patient is rather asymptomatic from pulmonary standpoint  · Continue with the antibiotics  · Thoracic surgery have evaluated the pt and she is not short of breath   · 8/15-imaging right hip shows no osseous involvement; concern for proximity of abscess status SI joint, but no SI joint involvement at this time  · Management as above under back pain    Back pain  Assessment & Plan  · Patient is complaining of severe back pain mainly in the lower part of the back and also in the sacral region  · Patient had MRI of the lumbar and thoracic spine during the recent hospital stay in [facility] which was unremarkable  · - increase robaxin to 750 mg q 6  · - continue oxy to 10 mg and 15 mg   · - continue iv morphine 4 mg to q3 hrs prn for breakthrough  · -continue tylenol atc pt can refuse (she states it makes her break out in profuse sweats)   · - continue lidocaine patch  · -increase gabapentin to 300mg TID  · Iliacus muscle abscess is noted on CT scan, continue antibiotic treatment as above, no indication for drainage at this time  · Repeat CT scan shows improvement in septic emboli to iliacus muscle  · Possible MRI with sedation on Wednesday due to severe claustrophobia    Intravenous drug abuse ([facility] [address] )  Assessment & Plan  · Patient with acute drug abuse and likely the source of bacteremia  · Patient has a history of signing against medical advise  · May benefit from drug rehab eventually  · During the hospital stay in July of this year patient was positive for THC ,cocaine and opiates    Bipolar 1 disorder ([facility] [address] )  Assessment & Plan  · Patient refused Psychiatry and neuropsych evaluation, once pain more controlled will discuss again and have their input   · Supportive care        VTE Pharmacologic Prophylaxis:   Pharmacologic: Heparin  Mechanical VTE Prophylaxis in Place: Yes    Patient Centered Rounds: I have performed bedside rounds with nursing staff today      Discussions with Specialists or Other Care Team Provider: acute pain service    Education and Discussions with Family / Patient: patient, plan of care    Time Spent for Care: 20 minutes  More than 50% of total time spent on counseling and coordination of care as described above  Current Length of Stay: 10 day(s)    Current Patient Status: Inpatient   Certification Statement: The patient will continue to require additional inpatient hospital stay due to endocarditis treatment    Discharge Plan: home when stable, after ABX treatment    Code Status: Level 1 - Full Code      Subjective:   Reports pain in right hip area as prior  BM yesterday  tolerating diet    Objective:     Vitals:   Temp (24hrs), Av 3 °F (36 8 °C), Min:98 °F (36 7 °C), Max:98 5 °F (36 9 °C)    Temp:  [98 °F (36 7 °C)-98 5 °F (36 9 °C)] 98 2 °F (36 8 °C)  HR:  [] 109  Resp:  [18] 18  BP: (112-122)/(62-69) 112/69  SpO2:  [96 %-100 %] 98 %  Body mass index is 23 3 kg/m²  Input and Output Summary (last 24 hours): Intake/Output Summary (Last 24 hours) at 2020 1706  Last data filed at 2020 1322  Gross per 24 hour   Intake 478 09 ml   Output 1225 ml   Net -746 91 ml       Physical Exam:     Physical Exam  Constitutional:       Appearance: Normal appearance  HENT:      Head: Normocephalic and atraumatic  Mouth/Throat:      Mouth: Mucous membranes are moist    Eyes:      Extraocular Movements: Extraocular movements intact  Pupils: Pupils are equal, round, and reactive to light  Cardiovascular:      Rate and Rhythm: Normal rate and regular rhythm  Pulmonary:      Effort: Pulmonary effort is normal       Breath sounds: No wheezing or rales  Abdominal:      General: Abdomen is flat  Palpations: Abdomen is soft  Musculoskeletal:      Right lower leg: No edema  Left lower leg: No edema  Skin:     General: Skin is warm and dry  Neurological:      General: No focal deficit present        Mental Status: She is alert and oriented to person, place, and time  Comments: Spontaneously elevating and flexing at the right hip without difficulty or visible signs of distress   Psychiatric:         Mood and Affect: Mood normal          Behavior: Behavior normal          Additional Data:     Labs:    Results from last 7 days   Lab Units 08/21/20  0459   WBC Thousand/uL 8 70   HEMOGLOBIN g/dL 7 9*   HEMATOCRIT % 25 5*   PLATELETS Thousands/uL 478*   NEUTROS PCT % 58   LYMPHS PCT % 32   MONOS PCT % 7   EOS PCT % 2     Results from last 7 days   Lab Units 08/21/20  0459   SODIUM mmol/L 137   POTASSIUM mmol/L 4 1   CHLORIDE mmol/L 101   CO2 mmol/L 29   BUN mg/dL 12   CREATININE mg/dL 0 50*   ANION GAP mmol/L 7   CALCIUM mg/dL 8 9   ALBUMIN g/dL 2 2*   TOTAL BILIRUBIN mg/dL 0 20   ALK PHOS U/L 161*   ALT U/L 52   AST U/L 78*   GLUCOSE RANDOM mg/dL 105                           * I Have Reviewed All Lab Data Listed Above  * Additional Pertinent Lab Tests Reviewed:  All Labs Within Last 24 Hours Reviewed        Recent Cultures (last 7 days):           Last 24 Hours Medication List:   Current Facility-Administered Medications   Medication Dose Route Frequency Provider Last Rate    calcium carbonate  1,000 mg Oral Daily PRN Dimas Polk MD      cefazolin  2,000 mg Intravenous Q8H Asha Roca PA-C 2,000 mg (08/22/20 1556)    enoxaparin  40 mg Subcutaneous Q24H [address] Loretta White MD      gabapentin  300 mg Oral TID Ky Hdz MD      glycopyrrolate  0 2 mg Intravenous Q4H PRN JESUS Mcclelland-NALLELY      haloperidol lactate  2 mg Intramuscular Q30 Min PRN JESUS Mcclelland-NALLELY      ketamine  0 2 mg/kg/hr Intravenous Continuous Dora Shanda, PA-C 0 2 mg/kg/hr (08/22/20 1322)    lidocaine  2 patch Topical Daily Asha Roca PA-C      LORazepam  2 mg Intravenous Q3H PRN Ky Hdz MD      magnesium citrate  296 mL Oral Daily PRN Loretta White MD      melatonin  6 mg Oral HS PRN Asha Roca PA-C      methocarbamol  750 mg Oral Q6H [address] Xena Pacheco MD      methylnaltrexone bromide  12 mg Subcutaneous Q48H PRN Xena Pacheco MD      mirtazapine  30 mg Oral HS Bella Mims MD      morphine injection  4 mg Intravenous Q3H PRN Xena Pacheco MD      ondansetron  4 mg Intravenous Q6H PRN Bella Mims MD      oxyCODONE  10 mg Oral Q4H PRN Danilo Betters, CRNP      oxyCODONE  15 mg Oral Q4H PRN Danilo Betters, CRNP      saccharomyces boulardii  250 mg Oral BID Tan Hines MD      senna  1 tablet Oral BID Tan Hines MD          Today, Patient Was Seen By: Sergio Queen MD    ** Please Note: Dictation voice to text software may have been used in the creation of this document   **

## 2020-08-22 NOTE — PHYSICAL THERAPY NOTE
Physical Therapy Evaluation     Patient's Name: Rachel Senior    Admitting Diagnosis  Endocarditis [I38]    Problem List  Patient Active Problem List   Diagnosis    40 weeks gestation of pregnancy    Spontaneous vaginal delivery    Acute pyelonephritis    Loculated pleural effusion    Heroin abuse ([address] )    Bacteremia due to Staphylococcus aureus    Abscess of left foot    Insomnia    Acute bacterial endocarditis    Septic embolism (HCC)    Hypokalemia    Bipolar 1 disorder (HCC)    Back pain    Intravenous drug abuse ([facility] [address] [facility] )       Past Medical History  Past Medical History:   Diagnosis Date    Abnormal Pap smear of cervix     Anxiety     Depression     Endocarditis     2018    Hepatitis C     HPV (human papilloma virus) anogenital infection        Past Surgical History  Past Surgical History:   Procedure Laterality Date    KNEE SURGERY Left     MOUTH SURGERY          08/22/20 1134   Note Type   Note type Eval only   Pain Assessment   Pain Assessment Tool 0-10   Pain Score Worst Possible Pain   Home Living   Type of [address] [place] [address] Two level;Bed/bath upstairs;1/2 bath on main level;Stairs to enter with rails   Bathroom Equipment   (denies DME)   Home Equipment   (denies DME)   Additional Comments Pt resides in [facility] w/ 2 MARY and FF to bed/bath on 2nd floor  Pt reports 1/2 bath on main level  Pt was ambulating w/out AD PTA   Prior Function   Level of [place] Independent with ADLs and functional mobility   Lives With Family  (mother, step dad, brother, and 2 y/o dtr)   Receives Help From Family   ADL Assistance Independent   IADLs Independent   Falls in the last 6 months 0   Restrictions/Precautions   Weight Bearing Precautions Per Order No   Other Precautions Multiple lines; Fall Risk;Pain   General   Family/Caregiver Present No   Cognition   Overall Cognitive Status WFL   Arousal/Participation Alert   Orientation Level Oriented X4   Memory Decreased recall of precautions Following Commands Follows one step commands without difficulty   Comments Pt expresses feeling frusturated over pain management of R hip  Pt requesting PT come assist her to the bathroom  Upon arrival, pt requesting anna lift or "to carry me to the bathroom" Pt edcuated that these are not options  Pt then asking for "a strap to put under my RLE so there's no pressure put on my R hip" Pt educated that w/ use of RW she would not have to put weight through RLE if desired  Pt reports burning and stabbling pain in R hip/buttock region- pt will not allow therapist to touch RLE in attempts to assist w/ bed mobility/transfers 2' pain   RLE Assessment   RLE Assessment   (at least 3/5; pt able to actively ext/flex R hip and knee)   LLE Assessment   LLE Assessment WFL   Coordination   Movements are Fluid and Coordinated 0   Coordination and Movement Description slow, guarded, antalgic   Bed Mobility   Rolling R 5  Supervision   Additional items Increased time required   Rolling L 5  Supervision   Additional items Increased time required   Supine to Sit 4  Minimal assistance   Additional items Assist x 1;HOB elevated; Increased time required;Verbal cues   Sit to Supine 4  Minimal assistance   Additional items Assist x 1; Increased time required;Verbal cues   Additional Comments Pt lying supine upon PT arrival  Pt performed rolling to L at supervision  Pt able to move LEs off side of bed w/out assistance  Pt given Min A at trunk to obtain seated position- pt only able to obtain ~75% upright trunk position and then impulsively returned supine 2' significant pain levels in RLE   Pt reports not wanting to use full effort "just to sit on a commode because then I'll be in pain and screaming all day" Pt not willing to trial SPT to commode as pt only wants to ambulate to an actual toilet but reports too much pain when sitting EOB   Transfers   Sit to Stand Unable to assess   Balance   Static Sitting Zero  (pt unable to obtain full upright sitting position 2' pain)   Endurance Deficit   Endurance Deficit Yes   Endurance Deficit Description pain   Activity Tolerance   Activity Tolerance Patient limited by pain;Treatment limited secondary to agitation   Nurse Made Aware yes   Assessment   Prognosis Guarded   Problem List Decreased strength;Decreased endurance; Impaired balance;Decreased mobility;Pain   Assessment Pt is 34 y o  female seen for PT evaluation s/p admit to [facility] on 8/12/2020 w/ Bacteremia due to Staphylococcus aureus  Pt transferred to [facility] for CT surgery consult for recurrent bacteremia  Pt has been admitted to several hospital since 7/15/20 and has left AMA  Pt reports LBP/R hip pain  CT demonstrated "2 small abscesses in the right posterior iliacus muscle approximately at the level of the upper to mid SI joint" on 8/15/20  Repeat CT on 8/21/20 demonstrates "Interval decrease in size of small collections within the right iliacus muscle"  PT consulted to assess pt's functional mobility and d/c needs  Order placed for PT eval and tx, w/ ambulate patient order  Comorbidities affecting pt's physical performance at time of assessment include: bacteremia, endocarditis, septic embolism Bipolar disorders, IV drug abuse, abscess L foot  PTA, pt was independent w/ all functional mobility w/ no AD and lives w/ mother, stepdad, brother, and 2 y/o daughter in two level house w/ 2 MARY and FF to bed/bath on 2nd floor  Pt reports 1/2 bath on main level  Personal factors affecting pt at time of IE include: inaccessible home environment, lives in two story house, stairs to enter home, inability to ambulate household distances, inability to navigate community distances, decreased initiation and engagement, limited insight into impairments and inability to perform IADLs   Please find objective findings from PT assessment regarding body systems outlined above with impairments and limitations including weakness, decreased ROM, impaired balance, decreased endurance, gait deviations, pain, decreased activity tolerance, decreased functional mobility tolerance and fall risk  Pt lying supine upon PT arrival  Pt performed rolling to L at supervision  Pt able to move LEs off side of bed w/out assistance  Pt given Min A at trunk to obtain seated position- pt only able to obtain ~75% upright trunk position and then impulsively returned supine 2' significant pain levels in RLE  Pt reports not wanting to use full effort "just to sit on a commode because then I'll be in pain and screaming all day" Pt not willing to trial SPT to commode as pt only wants to ambulate to an actual toilet but reports too much pain when sitting EOB  The following objective measures performed on IE also reveal limitations: Modified [place]: 4 (moderate/severe disability)  Pt's clinical presentation is currently unstable/unpredictable seen in pt's presentation of recent admission for bacteremia, recent decline in function as compared to baseline, multiple lines, fall risk, pain  Pt to benefit from continued PT tx to address deficits as defined above and maximize level of functional independent mobility and consistency  From PT/mobility standpoint, recommendation at time of d/c would be STR pending progress in order to facilitate return to PLOF  Barriers to Discharge Inaccessible home environment   Goals   Patient Goals To have less pain   STG Expiration Date 09/05/20   Short Term Goal #1 1  Pt will demonstrate the ability to perform all aspects of bed mobility at Mod I in onder to maximize functional independence and decrease burden on caregivers  2 Pt will demonstrate improved balance by one grade in order to decrease risk of falls  3 Pt will increase b/l LE strength by 1 grade in order to increase ease of functional mobility and transfers  4 Pt will be able to tolerate sitting EOB >20 minutes for improved participation in PT interventions   PT to assess further mobility as appropriate   PT Treatment Day 0   Plan   Treatment/Interventions Functional transfer training;LE strengthening/ROM; Therapeutic exercise; Endurance training;Patient/family training;Equipment eval/education; Bed mobility;Spoke to nursing   PT Frequency   (3-5x/week)   Recommendation   PT Discharge Recommendation Post-Acute Rehabilitation Services  (pending progress)   Equipment Recommended   (TBD)   Modified [place] Scale   Modified [place] Scale 5     Deepak Patino, PT, DPT

## 2020-08-22 NOTE — QUICK NOTE
I was asked to see Adam Huizar for reports of uncontrolled pain after prn pain meds given  She was yelling loudly when I entered her room  She was tearful and agitated, hyperventilating at times during the encounter  Tells me her pain has been unchanged, 10/10 in her R hip, feels "worse than childbirth"  Moderate ttp over R hip with decreased ROM 2/2 pain  No back pain or ttp  LE sensation intact with 4/5 strength b/l  Chart was reviewed and discussed with APS  I went to check on her shortly after she received her 1 mg prn ativan and she was sleeping comfortably

## 2020-08-22 NOTE — PROGRESS NOTES
Pt's been screaming and crying for pain since change of shift and this is after having prn pain meds around 1800  Slim on call, Ashlyn Hernandez, made aware and so does anesthesia  Per anesthesia, ketamine gtt can't be increase and that to continue with prn pain meds and ativan  Pt so far had 4mg of prn in 5hr time with ketamine gtt

## 2020-08-22 NOTE — PROGRESS NOTES
Progress Note - Acute Pain Service    Pablo Mendez 34 y o  female MRN: [id number]  Unit/Bed#: [facility] 425-01 Encounter: [id number]        Assessment:   Principal Problem:    Bacteremia due to Staphylococcus aureus  Active Problems:    Acute bacterial endocarditis    Septic embolism (HCC)    Bipolar 1 disorder (HCC)    Back pain    Intravenous drug abuse ([facility] [address] )       Plan:   · Continue ketamine infusion at 0 2 mg/kg/hr  · Continue oxycodone 10 mg p o  q 4 hours p r n  moderate pain  · Continue oxycodone 15 mg p o  q 4 hours p r n  severe pain  · Continue morphine 4 mg IV q 3 hours p r n  breakthrough pain  · Increase gabapentin to 300 mg p o  t i d  scheduled  · Increase methocarbamol to 750 mg p o  q 6 hours scheduled  · Continue lidocaine 5% patch, 2 patches to lower back and extremities for 12 hours daily  · Continue bowel regimen to avoid opioid induced constipation  Patient has had a recent bowel movement      APS will continue to follow; please contact APS ( [phone]) with any further questions     Pain History  Current pain location(s):  Right hip  Pain Scale:   10 out 10  Quality:  Sharp  24 hour history:  Patient continues to have significant right hip pain despite the current pain regimen   There was discussion of possible MRI with sedation this weekend to reevaluate hip     Meds/Allergies     all current active meds have been reviewed, current meds:   Current Medications[]Expand by Default          Current Facility-Administered Medications   Medication Dose Route Frequency    calcium carbonate (TUMS) chewable tablet 1,000 mg  1,000 mg Oral Daily PRN    ceFAZolin (ANCEF) IVPB (premix) 2,000 mg 50 mL  2,000 mg Intravenous Q8H    enoxaparin (LOVENOX) subcutaneous injection 40 mg  40 mg Subcutaneous Q24H JEFF    gabapentin (NEURONTIN) capsule 200 mg  200 mg Oral TID    glycopyrrolate (ROBINUL) injection 0 2 mg  0 2 mg Intravenous Q4H PRN    haloperidol lactate (HALDOL) injection 2 mg  2 mg Intramuscular Q30 Min PRN    ketamine 250 mg (STANDARD CONCENTRATION) IV in sodium chloride 0 9% 250 mL  0 2 mg/kg/hr Intravenous Continuous    lidocaine (LIDODERM) 5 % patch 2 patch  2 patch Topical Daily    LORazepam (ATIVAN) injection 1 mg  1 mg Intravenous Q1H PRN    magnesium citrate (CITROMA) oral solution 296 mL  296 mL Oral Daily PRN    melatonin tablet 6 mg  6 mg Oral HS PRN    methocarbamol (ROBAXIN) tablet 500 mg  500 mg Oral Q6H [address]    mirtazapine (REMERON) tablet 30 mg  30 mg Oral HS    morphine (PF) 4 mg/mL injection 4 mg  4 mg Intravenous Q3H PRN    ondansetron (ZOFRAN) injection 4 mg  4 mg Intravenous Q6H PRN    oxyCODONE (ROXICODONE) immediate release tablet 10 mg  10 mg Oral Q4H PRN    oxyCODONE (ROXICODONE) IR tablet 15 mg  15 mg Oral Q4H PRN    saccharomyces boulardii (FLORASTOR) capsule 250 mg  250 mg Oral BID    senna (SENOKOT) tablet 8 6 mg  1 tablet Oral BID       and PTA meds:   None             Allergies   Allergen Reactions    Cat Hair Extract      Dog Epithelium      Latex      Pollen Extract             Objective []Expand by Default        Temp:  [97 6 °F (36 4 °C)-98 9 °F (37 2 °C)] 98 9 °F (37 2 °C)  HR:  [] 107  Resp:  [18] 18  BP: (114-120)/(62-68) 114/62     Physical Exam  Vitals signs and nursing note reviewed  Constitutional:       General: She is awake  She is in acute distress  Eyes:      Pupils: Pupils are equal, round, and reactive to light  Cardiovascular:      Rate and Rhythm: Normal rate and regular rhythm  Musculoskeletal:      Right hip: She exhibits decreased range of motion, tenderness and bony tenderness  She exhibits no crepitus and no deformity  Skin:     General: Skin is warm and dry  Neurological:      General: No focal deficit present  Mental Status: She is alert and oriented to person, place, and time  GCS: GCS eye subscore is 4  GCS verbal subscore is 5  GCS motor subscore is 6     Psychiatric: Behavior: Behavior is cooperative           Counseling / Coordination of Care  Total floor / unit time spent today 30 minutes  Greater than 50% of total time was spent with the patient and / or family counseling and / or coordination of care   A description of the counseling / coordination of care:  Patient interview, physical examination, review of medical record, review of imaging and laboratory data, development of pain management plan, discussion of pain management plan with patient, nursing and primary service   Fareed Nuñez MD

## 2020-08-22 NOTE — ASSESSMENT & PLAN NOTE
· Patient with acute drug abuse and likely the source of bacteremia    · Patient has a history of signing against medical advise  · May benefit from drug rehab eventually  · During the hospital stay in July of this year patient was positive for [facility] ,cocaine and opiates

## 2020-08-23 ENCOUNTER — APPOINTMENT (INPATIENT)
Dept: NON INVASIVE DIAGNOSTICS | Facility: HOSPITAL | Age: 30
DRG: 163 | End: 2020-08-23
Payer: COMMERCIAL

## 2020-08-23 PROBLEM — I82.A12 DVT OF AXILLARY VEIN, ACUTE LEFT (HCC): Status: ACTIVE | Noted: 2020-08-23

## 2020-08-23 PROCEDURE — 99232 SBSQ HOSP IP/OBS MODERATE 35: CPT | Performed by: INTERNAL MEDICINE

## 2020-08-23 PROCEDURE — 99233 SBSQ HOSP IP/OBS HIGH 50: CPT | Performed by: ANESTHESIOLOGY

## 2020-08-23 PROCEDURE — 93971 EXTREMITY STUDY: CPT

## 2020-08-23 PROCEDURE — 93971 EXTREMITY STUDY: CPT | Performed by: SURGERY

## 2020-08-23 RX ADMIN — OXYCODONE HYDROCHLORIDE 15 MG: 10 TABLET ORAL at 20:00

## 2020-08-23 RX ADMIN — OXYCODONE HYDROCHLORIDE 15 MG: 10 TABLET ORAL at 14:28

## 2020-08-23 RX ADMIN — MIRTAZAPINE 30 MG: 30 TABLET, FILM COATED ORAL at 21:50

## 2020-08-23 RX ADMIN — OXYCODONE HYDROCHLORIDE 15 MG: 10 TABLET ORAL at 01:13

## 2020-08-23 RX ADMIN — LORAZEPAM 2 MG: 2 INJECTION INTRAMUSCULAR; INTRAVENOUS at 09:20

## 2020-08-23 RX ADMIN — MORPHINE SULFATE 4 MG: 4 INJECTION INTRAVENOUS at 09:20

## 2020-08-23 RX ADMIN — GABAPENTIN 300 MG: 300 CAPSULE ORAL at 15:34

## 2020-08-23 RX ADMIN — Medication 0.2 MG/KG/HR: at 10:26

## 2020-08-23 RX ADMIN — SENNOSIDES 8.6 MG: 8.6 TABLET ORAL at 18:42

## 2020-08-23 RX ADMIN — LORAZEPAM 2 MG: 2 INJECTION INTRAMUSCULAR; INTRAVENOUS at 02:38

## 2020-08-23 RX ADMIN — MORPHINE SULFATE 4 MG: 4 INJECTION INTRAVENOUS at 12:19

## 2020-08-23 RX ADMIN — MORPHINE SULFATE 4 MG: 4 INJECTION INTRAVENOUS at 21:51

## 2020-08-23 RX ADMIN — MELATONIN 6 MG: at 21:50

## 2020-08-23 RX ADMIN — ENOXAPARIN SODIUM 40 MG: 40 INJECTION SUBCUTANEOUS at 09:20

## 2020-08-23 RX ADMIN — METHOCARBAMOL TABLETS 750 MG: 750 TABLET, COATED ORAL at 11:32

## 2020-08-23 RX ADMIN — CEFAZOLIN SODIUM 2000 MG: 2 SOLUTION INTRAVENOUS at 09:21

## 2020-08-23 RX ADMIN — LORAZEPAM 2 MG: 2 INJECTION INTRAMUSCULAR; INTRAVENOUS at 15:34

## 2020-08-23 RX ADMIN — MORPHINE SULFATE 4 MG: 4 INJECTION INTRAVENOUS at 15:34

## 2020-08-23 RX ADMIN — MORPHINE SULFATE 4 MG: 4 INJECTION INTRAVENOUS at 18:41

## 2020-08-23 RX ADMIN — Medication 250 MG: at 18:42

## 2020-08-23 RX ADMIN — MORPHINE SULFATE 4 MG: 4 INJECTION INTRAVENOUS at 06:21

## 2020-08-23 RX ADMIN — METHOCARBAMOL TABLETS 750 MG: 750 TABLET, COATED ORAL at 23:07

## 2020-08-23 RX ADMIN — CEFAZOLIN SODIUM 2000 MG: 2 SOLUTION INTRAVENOUS at 15:34

## 2020-08-23 RX ADMIN — OXYCODONE HYDROCHLORIDE 15 MG: 10 TABLET ORAL at 11:34

## 2020-08-23 RX ADMIN — ENOXAPARIN SODIUM 60 MG: 60 INJECTION SUBCUTANEOUS at 21:50

## 2020-08-23 RX ADMIN — METHOCARBAMOL TABLETS 750 MG: 750 TABLET, COATED ORAL at 18:42

## 2020-08-23 RX ADMIN — LORAZEPAM 2 MG: 2 INJECTION INTRAMUSCULAR; INTRAVENOUS at 18:42

## 2020-08-23 RX ADMIN — MORPHINE SULFATE 4 MG: 4 INJECTION INTRAVENOUS at 02:38

## 2020-08-23 RX ADMIN — GABAPENTIN 300 MG: 300 CAPSULE ORAL at 21:50

## 2020-08-23 RX ADMIN — LORAZEPAM 2 MG: 2 INJECTION INTRAMUSCULAR; INTRAVENOUS at 12:19

## 2020-08-23 RX ADMIN — METHOCARBAMOL TABLETS 750 MG: 750 TABLET, COATED ORAL at 06:21

## 2020-08-23 RX ADMIN — Medication 250 MG: at 09:21

## 2020-08-23 RX ADMIN — CEFAZOLIN SODIUM 2000 MG: 2 SOLUTION INTRAVENOUS at 23:07

## 2020-08-23 RX ADMIN — GABAPENTIN 300 MG: 300 CAPSULE ORAL at 09:21

## 2020-08-23 RX ADMIN — LORAZEPAM 2 MG: 2 INJECTION INTRAMUSCULAR; INTRAVENOUS at 06:21

## 2020-08-23 RX ADMIN — ENOXAPARIN SODIUM 60 MG: 60 INJECTION SUBCUTANEOUS at 14:28

## 2020-08-23 RX ADMIN — LORAZEPAM 2 MG: 2 INJECTION INTRAMUSCULAR; INTRAVENOUS at 21:51

## 2020-08-23 NOTE — ASSESSMENT & PLAN NOTE
· Recurrent MSSA bacteremia  · Initially admitted to 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

## 2020-08-23 NOTE — PROGRESS NOTES
Pt was very agitated and angry again tonight because she couldn't stand and walk to the BR to make bm  She was able to stand at bedside with walker for about a second or two but got frustrated, started screaming in pain  She settled with using the bedpan   I asked if felt constipated and pt stated "I went last night but I had to use my finger to push bm out " and then tonight after having a bm, she handed me a plastic spoon in a cup from her bedside and her drinking carafe to be thrown away because she "had to use the spoon to help take bm out and used her water carafe to flush her vagina "

## 2020-08-23 NOTE — ASSESSMENT & PLAN NOTE
· Patient is complaining of severe back pain mainly in the lower part of the back and also in the sacral region  · Patient had MRI of the lumbar and thoracic spine during the recent hospital stay in [facility] which was unremarkable      · - increase robaxin to 750 mg q 6  · - continue oxy to 10 mg and 15 mg   · - continue iv morphine 4 mg to q3 hrs prn for breakthrough  · -continue tylenol atc pt can refuse (she states it makes her break out in profuse sweats)   · - continue lidocaine patch  · -increase gabapentin to 300mg TID  · Iliacus muscle abscess is noted on CT scan, continue antibiotic treatment as above, no indication for drainage at this time  · Repeat CT scan shows improvement in septic emboli to iliacus muscle  · Possible MRI with sedation on Wednesday due to severe claustrophobia if no improvement with antibiotic treatment

## 2020-08-23 NOTE — PROGRESS NOTES
Progress Note - Betty Awan 1990, 34 y o  female MRN: [id number]    Unit/Bed#: [facility] 425-01 Encounter: [id number]    Primary Care Provider: Tyrone Cox PA-C   Date and time admitted to hospital: 8/12/2020  7:39 PM        * Bacteremia due to Staphylococcus aureus  Assessment & Plan  · Recurrent MSSA bacteremia  · Initially admitted to [facility] from 07/15-blood culture were positive for MSSA bacteremia, but patient signed AMA on 07/22  Her repeat blood culture done on 7/21 was negative after 5 days  · [facility] on 07/30-blood culture came back positive for MSSA and she left again is medical advice on 08/04  Her blood cultures came back positive on 07/30, but the bacteremia cleared as of 8 /2  Patient left AMA on 08/04  · Readmitted to [address] [place] [address] on 08/10-blood culture came back positive for Staph aureus  · 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 during the fast hospital stay from 7/15-7/22  · Plan is to continue IV cefazolin for a 6 week course per ID recommendations  · Agreeable to repeat blood cultures today    Acute bacterial endocarditis  Assessment & Plan  · Patient noted to have tricuspid while vegetation on echocardiogram done in July  Patient had a transthoracic and also transesophageal echocardiogram done during the last hospital stay    · With septic emboli to lungs and posterior iliacus muscle  · Patient is transferred over here to be evaluated by CT surgery  · She was noncommittal to abstain from illegal drugs or unintended use of medications  · When she continues to abstain and completes iv abx treatment they would consider surgical correction   · Continue IV cefazolin as above    Septic embolism ([address] )  Assessment & Plan  · Patient noted to have abnormalities seen in the CT of the chest abdomen and pelvis concerning for septic emboli  · There is pleural effusion on the CT scan of the chest and also on repeat chest x-ray  · Patient is rather asymptomatic from pulmonary standpoint  · Continue with the antibiotics  · Thoracic surgery have evaluated the pt and she is not short of breath   · 8/15-imaging right hip shows no osseous involvement; concern for proximity of abscess status SI joint, but no SI joint involvement at this time  · Management as above under back pain    Back pain  Assessment & Plan  · Patient is complaining of severe back pain mainly in the lower part of the back and also in the sacral region  · Patient had MRI of the lumbar and thoracic spine during the recent hospital stay in [facility] which was unremarkable  · - increase robaxin to 750 mg q 6  · - continue oxy to 10 mg and 15 mg   · - continue iv morphine 4 mg to q3 hrs prn for breakthrough  · -continue tylenol atc pt can refuse (she states it makes her break out in profuse sweats)   · - continue lidocaine patch  · -increase gabapentin to 300mg TID  · Iliacus muscle abscess is noted on CT scan, continue antibiotic treatment as above, no indication for drainage at this time  · Repeat CT scan shows improvement in septic emboli to iliacus muscle  · Possible MRI with sedation on Wednesday due to severe claustrophobia if no improvement with antibiotic treatment    Intravenous drug abuse ([facility] [address] )  Assessment & Plan  · Patient with acute drug abuse and likely the source of bacteremia  · Patient has a history of signing against medical advise  · May benefit from drug rehab eventually  · During the hospital stay in July of this year patient was positive for [facility] ,cocaine and opiates    Bipolar 1 disorder [facility])  Assessment & Plan  · Patient refused Psychiatry and neuropsych evaluation, once pain more controlled will discuss again and have their input   · Supportive care    DVT of axillary vein, acute left [facility])  Assessment & Plan  Increase lovenox to 60mg Q12h  Due to severe pain will consult with vascular surgery for treatment options    May need picc removed to a different site  Picc continues to function well and post recent blood culture is negative  Elevation of LUE        VTE Pharmacologic Prophylaxis:   Pharmacologic: Enoxaparin (Lovenox)  Mechanical VTE Prophylaxis in Place: Yes    Patient Centered Rounds: I have performed bedside rounds with nursing staff today  Discussions with Specialists or Other Care Team Provider: pain management, ID, vascular surgery    Education and Discussions with Family / Patient: patient and mother, plan of care    Time Spent for Care: 20 minutes  More than 50% of total time spent on counseling and coordination of care as described above  Current Length of Stay: 11 day(s)    Current Patient Status: Inpatient   Certification Statement: The patient will continue to require additional inpatient hospital stay due to continue antibiotic treatment    Discharge Plan: TBD    Code Status: Level 1 - Full Code      Subjective:   Reports pain in right hip and LUE  Objective:     Vitals:   Temp (24hrs), Av 1 °F (36 7 °C), Min:97 4 °F (36 3 °C), Max:98 7 °F (37 1 °C)    Temp:  [97 4 °F (36 3 °C)-98 7 °F (37 1 °C)] (P) 98 3 °F (36 8 °C)  HR:  [101-122] (P) 122  Resp:  [16-18] (P) 18  BP: (108-114)/(61-72) (P) 118/81  SpO2:  [95 %-98 %] (P) 95 %  Body mass index is 23 3 kg/m²  Input and Output Summary (last 24 hours): Intake/Output Summary (Last 24 hours) at 2020 1426  Last data filed at 2020 0641  Gross per 24 hour   Intake 353 74 ml   Output 500 ml   Net -146 26 ml       Physical Exam:     Physical Exam  Constitutional:       General: She is in acute distress  Appearance: Normal appearance  She is not toxic-appearing  HENT:      Head: Normocephalic and atraumatic  Mouth/Throat:      Mouth: Mucous membranes are moist       Pharynx: Oropharynx is clear  Eyes:      Extraocular Movements: Extraocular movements intact  Pupils: Pupils are equal, round, and reactive to light     Neck: Musculoskeletal: Normal range of motion and neck supple  Cardiovascular:      Rate and Rhythm: Normal rate  Pulmonary:      Effort: Pulmonary effort is normal       Breath sounds: No wheezing  Abdominal:      General: Abdomen is flat  Palpations: Abdomen is soft  Musculoskeletal:      Comments: LUE picc line with minimal erythema at insertion site   Skin:     General: Skin is warm and dry  Neurological:      General: No focal deficit present  Mental Status: She is alert and oriented to person, place, and time  Psychiatric:      Comments: dysphoric         Additional Data:     Labs:    Results from last 7 days   Lab Units 08/21/20  0459   WBC Thousand/uL 8 70   HEMOGLOBIN g/dL 7 9*   HEMATOCRIT % 25 5*   PLATELETS Thousands/uL 478*   NEUTROS PCT % 58   LYMPHS PCT % 32   MONOS PCT % 7   EOS PCT % 2     Results from last 7 days   Lab Units 08/21/20  0459   SODIUM mmol/L 137   POTASSIUM mmol/L 4 1   CHLORIDE mmol/L 101   CO2 mmol/L 29   BUN mg/dL 12   CREATININE mg/dL 0 50*   ANION GAP mmol/L 7   CALCIUM mg/dL 8 9   ALBUMIN g/dL 2 2*   TOTAL BILIRUBIN mg/dL 0 20   ALK PHOS U/L 161*   ALT U/L 52   AST U/L 78*   GLUCOSE RANDOM mg/dL 105                           * I Have Reviewed All Lab Data Listed Above  * Additional Pertinent Lab Tests Reviewed:  All Labs Within Last 24 Hours Reviewed    Imaging:    Imaging Reports Reviewed Today Include: venous dopper of JENNIFERE    Recent Cultures (last 7 days):           Last 24 Hours Medication List:   Current Facility-Administered Medications   Medication Dose Route Frequency Provider Last Rate    calcium carbonate  1,000 mg Oral Daily PRN Jeremie Lobo MD      cefazolin  2,000 mg Intravenous Q8H Asha Roca PA-C 2,000 mg (08/23/20 0921)    enoxaparin  1 mg/kg Subcutaneous Q12H Olga Chaves MD      gabapentin  300 mg Oral TID Marleen Morgan MD      glycopyrrolate  0 2 mg Intravenous Q4H PRN Jasson Pizarro PA-C      haloperidol lactate  2 mg Intramuscular Q30 Min PRN Evan Kaufman PA-C      ketamine  0 2 mg/kg/hr Intravenous Continuous Evan Kaufman PA-C 0 2 mg/kg/hr (08/23/20 1026)    lidocaine  2 patch Topical Daily Asha Roca PA-C      LORazepam  2 mg Intravenous Q3H PRN Nasir Hdz MD      magnesium citrate  296 mL Oral Daily PRN Cirilo Brooks MD      melatonin  6 mg Oral HS PRN Asha Roca PA-C      methocarbamol  750 mg Oral Q6H [address] Nasir Hdz MD      methylnaltrexone bromide  12 mg Subcutaneous Q48H PRN Nasir Hdz MD      mirtazapine  30 mg Oral HS Hiral Nix MD      morphine injection  4 mg Intravenous Q3H PRN Nasir Hdz MD      ondansetron  4 mg Intravenous Q6H PRN Hiral Nix MD      oxyCODONE  10 mg Oral Q4H PRN BOO Cole      oxyCODONE  15 mg Oral Q4H PRN BOO Cole      saccharomyces boulardii  250 mg Oral BID Cirilo Brooks MD      senna  1 tablet Oral BID Cirilo Brooks MD          Today, Patient Was Seen By: Teresa Uribe MD    ** Please Note: Dictation voice to text software may have been used in the creation of this document   **

## 2020-08-23 NOTE — PROGRESS NOTES
Progress Note - Acute Pain Service    Delmis Mendosa 34 y o  female MRN: [id number]  Unit/Bed#: [facility] 425-01 Encounter: [id number]        Assessment:   Principal Problem:    Bacteremia due to Staphylococcus aureus  Active Problems:    Acute bacterial endocarditis    Septic embolism (HCC)    Bipolar 1 disorder (HCC)    Back pain    Intravenous drug abuse ([facility] [address] )       Plan:   · Continue ketamine infusion at 0 2 mg/kg/hr  · Continue oxycodone 10 mg p o  q 4 hours p r n  moderate pain  · Continue oxycodone 15 mg p o  q 4 hours p r n  severe pain  · Continue morphine 4 mg IV q 3 hours p r n  breakthrough pain  · Continue gabapentin to 300 mg p o  t i d  scheduled and monitor for sedation  · Increase methocarbamol to 750 mg p o  q 6 hours scheduled and monitor for sedation  · Continue lidocaine 5% patch, 2 patches to lower back and extremities for 12 hours daily  · Continue bowel regimen to avoid opioid induced constipation  Patient has had a recent bowel movement      APS will continue to follow; please contact APS ( [phone]) with any further questions     Pain History  Current pain location(s):  Right hip  Pain Scale:   10 out 10  Quality:  Sharp  24 hour history:  No significant improvements after adjustments to meds yesterday    Meds/Allergies     all current active meds have been reviewed, current meds:   Current Medications[]Expand by Default          Current Facility-Administered Medications   Medication Dose Route Frequency    calcium carbonate (TUMS) chewable tablet 1,000 mg  1,000 mg Oral Daily PRN    ceFAZolin (ANCEF) IVPB (premix) 2,000 mg 50 mL  2,000 mg Intravenous Q8H    enoxaparin (LOVENOX) subcutaneous injection 40 mg  40 mg Subcutaneous Q24H JEFF    gabapentin (NEURONTIN) capsule 200 mg  200 mg Oral TID    glycopyrrolate (ROBINUL) injection 0 2 mg  0 2 mg Intravenous Q4H PRN    haloperidol lactate (HALDOL) injection 2 mg  2 mg Intramuscular Q30 Min PRN    ketamine 250 mg (STANDARD CONCENTRATION) IV in sodium chloride 0 9% 250 mL  0 2 mg/kg/hr Intravenous Continuous    lidocaine (LIDODERM) 5 % patch 2 patch  2 patch Topical Daily    LORazepam (ATIVAN) injection 1 mg  1 mg Intravenous Q1H PRN    magnesium citrate (CITROMA) oral solution 296 mL  296 mL Oral Daily PRN    melatonin tablet 6 mg  6 mg Oral HS PRN    methocarbamol (ROBAXIN) tablet 500 mg  500 mg Oral Q6H [facility]    mirtazapine (REMERON) tablet 30 mg  30 mg Oral HS    morphine (PF) 4 mg/mL injection 4 mg  4 mg Intravenous Q3H PRN    ondansetron (ZOFRAN) injection 4 mg  4 mg Intravenous Q6H PRN    oxyCODONE (ROXICODONE) immediate release tablet 10 mg  10 mg Oral Q4H PRN    oxyCODONE (ROXICODONE) IR tablet 15 mg  15 mg Oral Q4H PRN    saccharomyces boulardii (FLORASTOR) capsule 250 mg  250 mg Oral BID    senna (SENOKOT) tablet 8 6 mg  1 tablet Oral BID       and PTA meds:   None             Allergies   Allergen Reactions    Cat Hair Extract      Dog Epithelium      Latex      Pollen Extract             Objective []Expand by Default        Temp:  [97 6 °F (36 4 °C)-98 9 °F (37 2 °C)] 98 9 °F (37 2 °C)  HR:  [] 107  Resp:  [18] 18  BP: (114-120)/(62-68) 114/62     Physical Exam  Vitals signs and nursing note reviewed  Constitutional:       General: She is awake  She continues in acute distress  Cardiovascular:      Rate and Rhythm: Normal rate    Skin:     General: Skin is warm and dry  Neurological:      General: No focal deficit present  Mental Status: She is alert and oriented to person, place, and time  Psychiatric:         Behavior: Behavior is agitated           Counseling / Coordination of Care  Total floor / unit time spent today 30 minutes  Greater than 50% of total time was spent with the patient and / or family counseling and / or coordination of care   A description of the counseling / coordination of care:  Patient interview, physical examination, review of medical record, review of imaging and laboratory data, development of pain management plan, discussion of pain management plan with patient, nursing and primary service

## 2020-08-23 NOTE — PLAN OF CARE
Problem: Prexisting or High Potential for Compromised Skin Integrity  Goal: Skin integrity is maintained or improved  Description: INTERVENTIONS:  - Identify patients at risk for skin breakdown  - Assess and monitor skin integrity  - Assess and monitor nutrition and hydration status  - Monitor labs   - Assess for incontinence   - Turn and reposition patient  - Assist with mobility/ambulation  - Relieve pressure over bony prominences  - Avoid friction and shearing  - Provide appropriate hygiene as needed including keeping skin clean and dry  - Evaluate need for skin moisturizer/barrier cream  - Collaborate with interdisciplinary team   - Patient/family teaching  - Consider wound care consult   Outcome: Progressing     Problem: PAIN - ADULT  Goal: Verbalizes/displays adequate comfort level or baseline comfort level  Description: Interventions:  - Encourage patient to monitor pain and request assistance  - Assess pain using appropriate pain scale  - Administer analgesics based on type and severity of pain and evaluate response  - Implement non-pharmacological measures as appropriate and evaluate response  - Consider cultural and social influences on pain and pain management  - Notify physician/advanced practitioner if interventions unsuccessful or patient reports new pain  Outcome: Progressing     Problem: INFECTION - ADULT  Goal: Absence or prevention of progression during hospitalization  Description: INTERVENTIONS:  - Assess and monitor for signs and symptoms of infection  - Monitor lab/diagnostic results  - Monitor all insertion sites, i e  indwelling lines, tubes, and drains  - Monitor endotracheal if appropriate and nasal secretions for changes in amount and color  - [place] appropriate cooling/warming therapies per order  - Administer medications as ordered  - Instruct and encourage patient and family to use good hand hygiene technique  - Identify and instruct in appropriate isolation precautions for identified infection/condition  Outcome: Progressing  Goal: Absence of fever/infection during neutropenic period  Description: INTERVENTIONS:  - Monitor WBC    Outcome: Progressing     Problem: SAFETY ADULT  Goal: Patient will remain free of falls  Description: INTERVENTIONS:  - Assess patient frequently for physical needs  -  Identify cognitive and physical deficits and behaviors that affect risk of falls    -  [place] fall precautions as indicated by assessment   - Educate patient/family on patient safety including physical limitations  - Instruct patient to call for assistance with activity based on assessment  - Modify environment to reduce risk of injury  - Consider OT/PT consult to assist with strengthening/mobility  Outcome: Progressing  Goal: Maintain or return to baseline ADL function  Description: INTERVENTIONS:  -  Assess patient's ability to carry out ADLs; assess patient's baseline for ADL function and identify physical deficits which impact ability to perform ADLs (bathing, care of mouth/teeth, toileting, grooming, dressing, etc )  - Assess/evaluate cause of self-care deficits   - Assess range of motion  - Assess patient's mobility; develop plan if impaired  - Assess patient's need for assistive devices and provide as appropriate  - Encourage maximum independence but intervene and supervise when necessary  - Involve family in performance of ADLs  - Assess for home care needs following discharge   - Consider OT consult to assist with ADL evaluation and planning for discharge  - Provide patient education as appropriate  Outcome: Progressing  Goal: Maintain or return mobility status to optimal level  Description: INTERVENTIONS:  - Assess patient's baseline mobility status (ambulation, transfers, stairs, etc )    - Identify cognitive and physical deficits and behaviors that affect mobility  - Identify mobility aids required to assist with transfers and/or ambulation (gait belt, sit-to-stand, lift, walker, cane, etc )  - [place] fall precautions as indicated by assessment  - Record patient progress and toleration of activity level on Mobility SBAR; progress patient to next Phase/Stage  - Instruct patient to call for assistance with activity based on assessment  - Consider rehabilitation consult to assist with strengthening/weightbearing, etc   Outcome: Progressing     Problem: CARDIOVASCULAR - ADULT  Goal: Maintains optimal cardiac output and hemodynamic stability  Description: INTERVENTIONS:  - Monitor I/O, vital signs and rhythm  - Monitor for S/S and trends of decreased cardiac output  - Administer and titrate ordered vasoactive medications to optimize hemodynamic stability  - Assess quality of pulses, skin color and temperature  - Assess for signs of decreased coronary artery perfusion  - Instruct patient to report change in severity of symptoms  Outcome: Progressing  Goal: Absence of cardiac dysrhythmias or at baseline rhythm  Description: INTERVENTIONS:  - Continuous cardiac monitoring, vital signs, obtain 12 lead EKG if ordered  - Administer antiarrhythmic and heart rate control medications as ordered  - Monitor electrolytes and administer replacement therapy as ordered  Outcome: Progressing     Problem: METABOLIC, FLUID AND ELECTROLYTES - ADULT  Goal: Electrolytes maintained within normal limits  Description: INTERVENTIONS:  - Monitor labs and assess patient for signs and symptoms of electrolyte imbalances  - Administer electrolyte replacement as ordered  - Monitor response to electrolyte replacements, including repeat lab results as appropriate  - Instruct patient on fluid and nutrition as appropriate  Outcome: Progressing  Goal: Fluid balance maintained  Description: INTERVENTIONS:  - Monitor labs   - Monitor I/O and WT  - Instruct patient on fluid and nutrition as appropriate  - Assess for signs & symptoms of volume excess or deficit  Outcome: Progressing  Goal: Glucose maintained within target range  Description: INTERVENTIONS:  - Monitor Blood Glucose as ordered  - Assess for signs and symptoms of hyperglycemia and hypoglycemia  - Administer ordered medications to maintain glucose within target range  - Assess nutritional intake and initiate nutrition service referral as needed  Outcome: Progressing     Problem: HEMATOLOGIC - ADULT  Goal: Maintains hematologic stability  Description: INTERVENTIONS  - Assess for signs and symptoms of bleeding or hemorrhage  - Monitor labs  - Administer supportive blood products/factors as ordered and appropriate  Outcome: Progressing     Problem: Potential for Falls  Goal: Patient will remain free of falls  Description: INTERVENTIONS:  - Assess patient frequently for physical needs  -  Identify cognitive and physical deficits and behaviors that affect risk of falls  -  [place] fall precautions as indicated by assessment   - Educate patient/family on patient safety including physical limitations  - Instruct patient to call for assistance with activity based on assessment  - Modify environment to reduce risk of injury  - Consider OT/PT consult to assist with strengthening/mobility  Outcome: Progressing     Problem: Nutrition/Hydration-ADULT  Goal: Nutrient/Hydration intake appropriate for improving, restoring or maintaining nutritional needs  Description: Monitor and assess patient's nutrition/hydration status for malnutrition  Collaborate with interdisciplinary team and initiate plan and interventions as ordered  Monitor patient's weight and dietary intake as ordered or per policy  Utilize nutrition screening tool and intervene as necessary  Determine patient's food preferences and provide high-protein, high-caloric foods as appropriate       INTERVENTIONS:  - Monitor oral intake, urinary output, labs, and treatment plans  - Assess nutrition and hydration status and recommend course of action  - Evaluate amount of meals eaten  - Assist patient with eating if necessary - Allow adequate time for meals  - Recommend/ encourage appropriate diets, oral nutritional supplements, and vitamin/mineral supplements  - Order, calculate, and assess calorie counts as needed  - Recommend, monitor, and adjust tube feedings and TPN/PPN based on assessed needs  - Assess need for intravenous fluids  - Provide specific nutrition/hydration education as appropriate  - Include patient/family/caregiver in decisions related to nutrition  Outcome: Progressing

## 2020-08-23 NOTE — PROGRESS NOTES
Pt wanted to wait for repeat BC to be drawn til this morning  Attempted unsuccessfully  Per pt, we'll need ultrasound for blood draw   Will pass to next shift

## 2020-08-23 NOTE — ASSESSMENT & PLAN NOTE
Increase lovenox to 60mg Q12h  Due to severe pain will consult with vascular surgery for treatment options  May need picc removed to a different site  Picc continues to function well and post recent blood culture is negative    Elevation of LUE

## 2020-08-23 NOTE — ASSESSMENT & PLAN NOTE
· Patient with acute drug abuse and likely the source of bacteremia    · Patient has a history of signing against medical advise  · May benefit from drug rehab eventually  · During the hospital stay in July of this year patient was positive for [facility] ,cocaine and opiates

## 2020-08-24 ENCOUNTER — APPOINTMENT (INPATIENT)
Dept: RADIOLOGY | Facility: HOSPITAL | Age: 30
DRG: 163 | End: 2020-08-24
Payer: COMMERCIAL

## 2020-08-24 LAB
ALBUMIN SERPL BCP-MCNC: 2.4 G/DL (ref 3.5–5)
ALP SERPL-CCNC: 160 U/L (ref 46–116)
ALT SERPL W P-5'-P-CCNC: 26 U/L (ref 12–78)
ANION GAP SERPL CALCULATED.3IONS-SCNC: 6 MMOL/L (ref 4–13)
AST SERPL W P-5'-P-CCNC: 30 U/L (ref 5–45)
BASOPHILS # BLD AUTO: 0.03 THOUSANDS/ΜL (ref 0–0.1)
BASOPHILS NFR BLD AUTO: 1 % (ref 0–1)
BILIRUB SERPL-MCNC: 0.17 MG/DL (ref 0.2–1)
BUN SERPL-MCNC: 7 MG/DL (ref 5–25)
CALCIUM SERPL-MCNC: 8.6 MG/DL (ref 8.3–10.1)
CHLORIDE SERPL-SCNC: 103 MMOL/L (ref 100–108)
CO2 SERPL-SCNC: 30 MMOL/L (ref 21–32)
CREAT SERPL-MCNC: 0.49 MG/DL (ref 0.6–1.3)
EOSINOPHIL # BLD AUTO: 0.25 THOUSAND/ΜL (ref 0–0.61)
EOSINOPHIL NFR BLD AUTO: 5 % (ref 0–6)
ERYTHROCYTE [DISTWIDTH] IN BLOOD BY AUTOMATED COUNT: 14.9 % (ref 11.6–15.1)
GFR SERPL CREATININE-BSD FRML MDRD: 134 ML/MIN/1.73SQ M
GLUCOSE SERPL-MCNC: 128 MG/DL (ref 65–140)
HCT VFR BLD AUTO: 32.5 % (ref 34.8–46.1)
HGB BLD-MCNC: 10.2 G/DL (ref 11.5–15.4)
IMM GRANULOCYTES # BLD AUTO: 0.05 THOUSAND/UL (ref 0–0.2)
IMM GRANULOCYTES NFR BLD AUTO: 1 % (ref 0–2)
LYMPHOCYTES # BLD AUTO: 2.2 THOUSANDS/ΜL (ref 0.6–4.47)
LYMPHOCYTES NFR BLD AUTO: 40 % (ref 14–44)
MCH RBC QN AUTO: 26.4 PG (ref 26.8–34.3)
MCHC RBC AUTO-ENTMCNC: 31.4 G/DL (ref 31.4–37.4)
MCV RBC AUTO: 84 FL (ref 82–98)
MONOCYTES # BLD AUTO: 0.35 THOUSAND/ΜL (ref 0.17–1.22)
MONOCYTES NFR BLD AUTO: 6 % (ref 4–12)
NEUTROPHILS # BLD AUTO: 2.65 THOUSANDS/ΜL (ref 1.85–7.62)
NEUTS SEG NFR BLD AUTO: 47 % (ref 43–75)
NRBC BLD AUTO-RTO: 0 /100 WBCS
PLATELET # BLD AUTO: 425 THOUSANDS/UL (ref 149–390)
PMV BLD AUTO: 8.2 FL (ref 8.9–12.7)
POTASSIUM SERPL-SCNC: 3.8 MMOL/L (ref 3.5–5.3)
PROT SERPL-MCNC: 8.2 G/DL (ref 6.4–8.2)
RBC # BLD AUTO: 3.86 MILLION/UL (ref 3.81–5.12)
SODIUM SERPL-SCNC: 139 MMOL/L (ref 136–145)
WBC # BLD AUTO: 5.53 THOUSAND/UL (ref 4.31–10.16)

## 2020-08-24 PROCEDURE — 99255 IP/OBS CONSLTJ NEW/EST HI 80: CPT | Performed by: SURGERY

## 2020-08-24 PROCEDURE — 99232 SBSQ HOSP IP/OBS MODERATE 35: CPT | Performed by: INTERNAL MEDICINE

## 2020-08-24 PROCEDURE — 99233 SBSQ HOSP IP/OBS HIGH 50: CPT | Performed by: INTERNAL MEDICINE

## 2020-08-24 PROCEDURE — 80053 COMPREHEN METABOLIC PANEL: CPT | Performed by: INTERNAL MEDICINE

## 2020-08-24 PROCEDURE — C1751 CATH, INF, PER/CENT/MIDLINE: HCPCS

## 2020-08-24 PROCEDURE — 85025 COMPLETE CBC W/AUTO DIFF WBC: CPT | Performed by: INTERNAL MEDICINE

## 2020-08-24 PROCEDURE — 36569 INSJ PICC 5 YR+ W/O IMAGING: CPT

## 2020-08-24 RX ORDER — MORPHINE SULFATE 4 MG/ML
4 INJECTION, SOLUTION INTRAMUSCULAR; INTRAVENOUS ONCE AS NEEDED
Status: COMPLETED | OUTPATIENT
Start: 2020-08-24 | End: 2020-08-26

## 2020-08-24 RX ADMIN — LORAZEPAM 2 MG: 2 INJECTION INTRAMUSCULAR; INTRAVENOUS at 17:54

## 2020-08-24 RX ADMIN — MORPHINE SULFATE 4 MG: 4 INJECTION INTRAVENOUS at 16:06

## 2020-08-24 RX ADMIN — OXYCODONE HYDROCHLORIDE 15 MG: 10 TABLET ORAL at 00:52

## 2020-08-24 RX ADMIN — LORAZEPAM 2 MG: 2 INJECTION INTRAMUSCULAR; INTRAVENOUS at 20:58

## 2020-08-24 RX ADMIN — METHOCARBAMOL TABLETS 750 MG: 750 TABLET, COATED ORAL at 14:15

## 2020-08-24 RX ADMIN — GABAPENTIN 300 MG: 300 CAPSULE ORAL at 20:58

## 2020-08-24 RX ADMIN — MORPHINE SULFATE 4 MG: 4 INJECTION INTRAVENOUS at 05:42

## 2020-08-24 RX ADMIN — MORPHINE SULFATE 4 MG: 4 INJECTION INTRAVENOUS at 20:58

## 2020-08-24 RX ADMIN — MIRTAZAPINE 30 MG: 30 TABLET, FILM COATED ORAL at 21:05

## 2020-08-24 RX ADMIN — ENOXAPARIN SODIUM 60 MG: 60 INJECTION SUBCUTANEOUS at 21:05

## 2020-08-24 RX ADMIN — CEFAZOLIN SODIUM 2000 MG: 2 SOLUTION INTRAVENOUS at 09:41

## 2020-08-24 RX ADMIN — OXYCODONE HYDROCHLORIDE 15 MG: 10 TABLET ORAL at 04:47

## 2020-08-24 RX ADMIN — LORAZEPAM 2 MG: 2 INJECTION INTRAMUSCULAR; INTRAVENOUS at 02:00

## 2020-08-24 RX ADMIN — MORPHINE SULFATE 4 MG: 4 INJECTION INTRAVENOUS at 09:40

## 2020-08-24 RX ADMIN — LORAZEPAM 2 MG: 2 INJECTION INTRAMUSCULAR; INTRAVENOUS at 05:42

## 2020-08-24 RX ADMIN — LORAZEPAM 2 MG: 2 INJECTION INTRAMUSCULAR; INTRAVENOUS at 14:15

## 2020-08-24 RX ADMIN — Medication 250 MG: at 17:54

## 2020-08-24 RX ADMIN — LORAZEPAM 2 MG: 2 INJECTION INTRAMUSCULAR; INTRAVENOUS at 09:40

## 2020-08-24 RX ADMIN — ONDANSETRON 4 MG: 2 INJECTION INTRAMUSCULAR; INTRAVENOUS at 22:24

## 2020-08-24 RX ADMIN — GABAPENTIN 300 MG: 300 CAPSULE ORAL at 16:07

## 2020-08-24 RX ADMIN — OXYCODONE HYDROCHLORIDE 15 MG: 10 TABLET ORAL at 14:15

## 2020-08-24 RX ADMIN — METHOCARBAMOL TABLETS 750 MG: 750 TABLET, COATED ORAL at 05:42

## 2020-08-24 RX ADMIN — CEFAZOLIN SODIUM 2000 MG: 2 SOLUTION INTRAVENOUS at 16:07

## 2020-08-24 RX ADMIN — GABAPENTIN 300 MG: 300 CAPSULE ORAL at 09:41

## 2020-08-24 RX ADMIN — Medication 250 MG: at 09:41

## 2020-08-24 RX ADMIN — MORPHINE SULFATE 4 MG: 4 INJECTION INTRAVENOUS at 02:01

## 2020-08-24 RX ADMIN — OXYCODONE HYDROCHLORIDE 15 MG: 10 TABLET ORAL at 19:38

## 2020-08-24 RX ADMIN — LIDOCAINE 5% 2 PATCH: 700 PATCH TOPICAL at 06:07

## 2020-08-24 RX ADMIN — METHOCARBAMOL TABLETS 750 MG: 750 TABLET, COATED ORAL at 17:54

## 2020-08-24 RX ADMIN — Medication 0.2 MG/KG/HR: at 05:42

## 2020-08-24 RX ADMIN — ENOXAPARIN SODIUM 60 MG: 60 INJECTION SUBCUTANEOUS at 10:09

## 2020-08-24 NOTE — ASSESSMENT & PLAN NOTE
· Patient with acute drug abuse and likely the source of bacteremia    · Patient has a history of signing against medical advise  · May benefit from drug rehab eventually  · During the hospital stay in July of this year patient was positive for [facility] ,cocaine and opiates

## 2020-08-24 NOTE — CONSULTS
Consultation - General Surgery   Nikky Colindres 34 y o  female MRN: [id number]  Unit/Bed#: [facility] 425-01 Encounter: [id number]    Assessment/Plan     Assessment:  35 yo F IVDU with bacteremia, acute bacterial endocarditis, septic emboli, developed non-occlusive LUE DVT due to PICC line  Swelling is minimum, on therapeutic lovenox    Plan:  Agree with anticoagulation  Remove PICC line when it's not necessary  Ibuprofen for superficial thrombophlebitis     History of Present Illness   Nikky Colindres is a 34 y o  female who presented as a transfer from [address] [place] [address] for CT surgery evaluation for tricuspid valve endocarditis  Patient with known history of intravenous drug abuse and she was initially admitted to the [facility] on 7/15/2020  Patient found to be bacteremic and also had septic emboli on the CT scan of the abdomen and chest   Patient was on antibiotics and as per reviewing the chart it appears that the bacteremia cleared  Patient had a TTE and a MELISSA both of which were concerning for tricuspid valve endocarditis with tricuspid regurgitation  Unfortunately patient signed AMA on 7/24/2020  It appears that the patient was admitted to [facility] on 7/30/2020 and at that time she was bacteremic a cane with MSSA  Patient had repeat blood culture on 8/2/2020 and that was negative  But patient left AMA on 08/04/2020  Patient was seen in [facility] with back pain and she was transferred to [address] [place] [address] for admission  Patient's blood culture done on 08/10 came back positive for Staph aureus  Final sensitivities pending  Currently patient is on antibiotics as per infectious disease recommendation  Patient was transferred over here to be evaluated by CT surgery given her persistent/recurrent bacteremia    She developed LUE pain and duplex ultrasound showed acute non occlusive DVT in axillary and subclavian veins and acute superficial thrombophelebitis in basilic vein  Vascular surgery was consulted              Inpatient consult to Vascular Surgery     Date/Time 8/24/2020 7:20 AM     Performed by  Dixon Mims MD     Authorized by Clarissa Neil MD              Review of Systems   Musculoskeletal: Positive for back pain and myalgias  Psychiatric/Behavioral: Positive for confusion  The patient is nervous/anxious  All other systems reviewed and are negative        Historical Information   Past Medical History:   Diagnosis Date    Abnormal Pap smear of cervix     Anxiety     Depression     Endocarditis     2018    Hepatitis C     HPV (human papilloma virus) anogenital infection      Past Surgical History:   Procedure Laterality Date    KNEE SURGERY Left     MOUTH SURGERY       Social History   Social History     Substance and Sexual Activity   Alcohol Use Not Currently    Alcohol/week: 0 0 standard drinks     Social History     Substance and Sexual Activity   Drug Use Yes    Types: Heroin    Comment: last use - one week ago     E-Cigarette/Vaping    E-Cigarette Use Never User      E-Cigarette/Vaping Substances     Social History     Tobacco Use   Smoking Status Former Smoker    Packs/day: 0 25    Types: Cigarettes    Last attempt to quit: 11/9/2016    Years since quitting: 3 7   Smokeless Tobacco Never Used   Tobacco Comment    current everyday smoker per [place]     Family History: non-contributory    Meds/Allergies   all current active meds have been reviewed  Allergies   Allergen Reactions    Cat Hair Extract     Dog Epithelium     Latex     Pollen Extract        Objective   First Vitals:   Blood Pressure: 115/77 (08/12/20 1900)  Pulse: 97 (08/12/20 1900)  Temperature: 98 5 °F (36 9 °C) (08/12/20 1900)  Temp Source: Tympanic (08/12/20 1900)  Respirations: 18 (08/12/20 1900)  Height: 5' 5" (165 1 cm) (08/12/20 1900)  Weight - Scale: 63 5 kg (139 lb 15 9 oz) (08/12/20 1900)  SpO2: 98 % (08/12/20 1900)    Current Vitals:   Blood Pressure: 117/68 (08/24/20 0715)  Pulse: (!) 108 (08/24/20 0715)  Temperature: 98 4 °F (36 9 °C) (08/24/20 0715)  Temp Source: Oral (08/24/20 0715)  Respirations: 18 (08/24/20 0715)  Height: 5' 5" (165 1 cm) (08/12/20 1900)  Weight - Scale: 63 5 kg (139 lb 15 9 oz) (08/12/20 1900)  SpO2: 98 % (08/24/20 0715)      Intake/Output Summary (Last 24 hours) at 8/24/2020 0720  Last data filed at 8/24/2020 0600  Gross per 24 hour   Intake 118 53 ml   Output 1850 ml   Net -1731 47 ml       Invasive Devices     Peripherally Inserted Central Catheter Line            PICC Line 50/42/32 Left Basilic 8 days                Physical Exam  Constitutional:       Appearance: Normal appearance  She is normal weight  HENT:      Head: Normocephalic and atraumatic  Right Ear: External ear normal       Left Ear: External ear normal       Nose: Nose normal       Mouth/Throat:      Mouth: Mucous membranes are moist    Eyes:      Extraocular Movements: Extraocular movements intact  Conjunctiva/sclera: Conjunctivae normal       Pupils: Pupils are equal, round, and reactive to light  Neck:      Musculoskeletal: Normal range of motion and neck supple  Cardiovascular:      Rate and Rhythm: Normal rate and regular rhythm  Pulses: Normal pulses  Pulmonary:      Effort: Pulmonary effort is normal       Breath sounds: Normal breath sounds  Abdominal:      General: Abdomen is flat  Bowel sounds are normal       Palpations: Abdomen is soft  Musculoskeletal: Normal range of motion  Comments: PICC line in LUE, minimum swelling, mild tenderness around insertion site, peripheral pulse 2+   Skin:     General: Skin is warm  Capillary Refill: Capillary refill takes less than 2 seconds  Neurological:      General: No focal deficit present  Mental Status: She is alert and oriented to person, place, and time  Mental status is at baseline     Psychiatric:         Mood and Affect: Mood normal          Lab Results: I have personally reviewed pertinent lab results  Imaging: I have personally reviewed pertinent reports  EKG, Pathology, and Other Studies: I have personally reviewed pertinent reports  Counseling / Coordination of Care  Total floor / unit time spent today 30 minutes  Greater than 50% of total time was spent with the patient and / or family counseling and / or coordination of care  A description of the counseling / coordination of care: Vera Sampson

## 2020-08-24 NOTE — PROGRESS NOTES
Patient re-evaluated following discontinuation of ketamine infusion  On my arrival, patient is sleeping  Patient then awoke stating that her pain is worse than prior to discontinuation of ketamine  Patient appeared to be falling asleep during our conversation and had recently received 4 mg IV morphine  Patient appears to be comfortable despite claims of worsening pain  Will continue current regimen and consider decreasing opioids starting tomorrow      Jef Calvo PA-C

## 2020-08-24 NOTE — ASSESSMENT & PLAN NOTE
· Recurrent MSSA bacteremia  · Initially admitted to [facility] from 07/15-blood culture were positive for MSSA bacteremia, but patient signed AMA on 07/22  Her repeat blood culture done on 7/21 was negative after 5 days  · [facility] on 07/30-blood culture came back positive for MSSA and she left again is medical advice on 08/04  Her blood cultures came back positive on 07/30, but the bacteremia cleared as of 8 /2  Patient left AMA on 08/04  · Readmitted to [address] [place] [address] on 08/10-blood culture came back positive for Staph aureus  · 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 during the fast hospital stay from 7/15-7/22    · Plan is to continue IV cefazolin for a 6 week course per ID recommendations

## 2020-08-24 NOTE — PROGRESS NOTES
Progress Note - Acute Pain Service    Rachel Carlos 34 y o  female MRN: [id number]  Unit/Bed#: [facility] 425-01 Encounter: [id number]      Assessment:   Principal Problem:    Bacteremia due to Staphylococcus aureus  Active Problems:    Acute bacterial endocarditis    Septic embolism (HCC)    Bipolar 1 disorder (HCC)    Back pain    Intravenous drug abuse ([address] )    DVT of axillary vein, acute left (HCC)      Plan:   · Will discontinue ketamine infusion this morning  Likely no longer providing any benefit in may be contributing to increasing agitation  · Continue oxycodone 10 mg p o  q 4 hours p r n  moderate pain  · Continue oxycodone 15 mg p o  q 4 hours p r n  severe pain  · Continue morphine 4 mg IV q 3 hours p r n  breakthrough pain  · Continue gabapentin 300 mg p o  t i d  scheduled  · Continue methocarbamol 750 mg p o  q 6 hours scheduled  · Continue lidocaine 5% patch, 2 patches for 12 hours daily  · Continue bowel regimen to avoid opioid induced constipation  · Had long discussion with patient this morning regarding her pain management  Again explained that due to long-term opioid use, increasing opioids in the hospital will provide no additional benefit and may cause increase side effects and/or complications  Patient insists that her pain will worsen if the ketamine is discontinued  APS will continue to follow; please contact APS ( [phone]) with any further questions    Pain History  Current pain location(s):  Lower back, right hip  Pain Scale:   10/10  Quality:  Sharp  24 hour history:  Patient continues to have 10/10 pain, continues to say that it is worsening despite improvement on recent CT scan  MRI pending 2 days from now  Patient appears somewhat more agitated and anxious, concerned about her pain increasing when stopping the ketamine  Discovered to have nonocclusive left axillary and subclavian DVT over the weekend      Opioid requirement previous 24 hours: Oxycodone 75 mg p o , morphine 32 mg IV  Meds/Allergies   all current active meds have been reviewed, current meds:   Current Facility-Administered Medications   Medication Dose Route Frequency    calcium carbonate (TUMS) chewable tablet 1,000 mg  1,000 mg Oral Daily PRN    ceFAZolin (ANCEF) IVPB (premix) 2,000 mg 50 mL  2,000 mg Intravenous Q8H    enoxaparin (LOVENOX) subcutaneous injection 60 mg  1 mg/kg Subcutaneous Q12H [address]    gabapentin (NEURONTIN) capsule 300 mg  300 mg Oral TID    glycopyrrolate (ROBINUL) injection 0 2 mg  0 2 mg Intravenous Q4H PRN    haloperidol lactate (HALDOL) injection 2 mg  2 mg Intramuscular Q30 Min PRN    lidocaine (LIDODERM) 5 % patch 2 patch  2 patch Topical Daily    LORazepam (ATIVAN) injection 2 mg  2 mg Intravenous Q3H PRN    magnesium citrate (CITROMA) oral solution 296 mL  296 mL Oral Daily PRN    melatonin tablet 6 mg  6 mg Oral HS PRN    methocarbamol (ROBAXIN) tablet 750 mg  750 mg Oral Q6H JEFF    methylnaltrexone (RELISTOR) subcutaneous injection 12 mg  12 mg Subcutaneous Q48H PRN    mirtazapine (REMERON) tablet 30 mg  30 mg Oral HS    morphine (PF) 4 mg/mL injection 4 mg  4 mg Intravenous Q3H PRN    ondansetron (ZOFRAN) injection 4 mg  4 mg Intravenous Q6H PRN    oxyCODONE (ROXICODONE) immediate release tablet 10 mg  10 mg Oral Q4H PRN    oxyCODONE (ROXICODONE) IR tablet 15 mg  15 mg Oral Q4H PRN    saccharomyces boulardii (FLORASTOR) capsule 250 mg  250 mg Oral BID    senna (SENOKOT) tablet 8 6 mg  1 tablet Oral BID    and PTA meds:   None       Allergies   Allergen Reactions    Cat Hair Extract     Dog Epithelium     Latex     Pollen Extract        Objective     Temp:  [98 1 °F (36 7 °C)-98 4 °F (36 9 °C)] 98 4 °F (36 9 °C)  HR:  [105-122] 108  Resp:  [18] 18  BP: (113-118)/(64-81) 117/68    Physical Exam  Vitals signs and nursing note reviewed  Constitutional:       General: She is awake  She is not in acute distress       Appearance: She is not toxic-appearing  Eyes:      Pupils: Pupils are equal, round, and reactive to light  Cardiovascular:      Rate and Rhythm: Regular rhythm  Tachycardia present  Skin:     General: Skin is warm and dry  Neurological:      General: No focal deficit present  Mental Status: She is alert and oriented to person, place, and time  GCS: GCS eye subscore is 4  GCS verbal subscore is 5  GCS motor subscore is 6  Psychiatric:         Mood and Affect: Mood is anxious  Behavior: Behavior is cooperative  Lab Results:   Results from last 7 days   Lab Units 08/24/20  0453   WBC Thousand/uL 5 53   HEMOGLOBIN g/dL 10 2*   HEMATOCRIT % 32 5*   PLATELETS Thousands/uL 425*      Results from last 7 days   Lab Units 08/24/20  0453   POTASSIUM mmol/L 3 8   CHLORIDE mmol/L 103   CO2 mmol/L 30   BUN mg/dL 7   CREATININE mg/dL 0 49*   CALCIUM mg/dL 8 6   ALK PHOS U/L 160*   ALT U/L 26   AST U/L 30       Imaging Studies: I have personally reviewed pertinent reports  EKG, Pathology, and Other Studies: I have personally reviewed pertinent reports  Counseling / Coordination of Care  Total floor / unit time spent today 20 minutes  Greater than 50% of total time was spent with the patient and / or family counseling and / or coordination of care  A description of the counseling / coordination of care:  Patient interview, physical examination, review of medical record, review of imaging and laboratory data, development of pain management plan, discussion of plan with patient, nursing and primary service      Jonathan Ladd PA-C

## 2020-08-24 NOTE — PROGRESS NOTES
Progress Note - Infectious Disease   Geneva Wilkinson 34 y o  female MRN: [id number]  Unit/Bed#: [facility] 425-01 Encounter: [id number]      Impression/Plan:  1  Recurrent/persistent MSSA bacteremia with TV infective endocarditis   Blood cultures from 8/10 remain positive   Prolonged course of IV antibiotic was previously recommended, but patient has left AMA on 2 prior occasions (once at [facility] and once from [facility])    She remains hemodynamically stable despite her ongoing bacteremia  Repeat blood cultures negative     -continue cefazolin for now at current dose  -recheck blood cultures x2 sets if patient will allow  -follow up repeat blood cultures  -recheck CBC with diff and CMP  -replace PICC line     2  Tricuspid valve endocarditis, 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  7/21 MELISSA showed large vegetation on TV with severe regurgitation  7/15 CT abdomen/pelvis also showed bilateral renal parenchymal abnormalities concerning for septic emboli   No intra-abdominal abscess     -antibiotic plan as above  -CT surgery is following      3  Right iliacus muscle abscesses-very small and unlikely to be able to be drained   At the level of the mid SI joint but the SI joint appears okay   Now with increasing pain of unclear significance   Perhaps the infection is now spread into the SI joint although this is not overtly seen on CT scan  Her increased pain may be more related to the decreased morphine dose    However she continues to have severe pain   -antibiotics as above  -pain management  -check CT the abdomen pelvis to look for evidence of iliopsoas abscess     4  Recent left foot cellulitis with abscess   Likely site of injection of IV drugs versus ectopic foci in the setting of MSSA bacteremia   This appears to have healed with no evidence of active infection   -antibiotic plan as above      5  Back pain   More likely secondary to chronic pain, opioid dependence   8/1/20 Thoracic and lumbar spine MRIs performed at [facility] negative were for abscess or osteomyelitis     -monitor back pain   -serial exams  -acute pain service follow-up     6  Active IV heroin abuse   This is the causative factor for development of bacteremia and infective endocarditis   Patient has left AMA on prior occasions   HIV screen negative  -patient is not a candidate for at home PICC line/IV antibiotics  -acute pain service follow-up     7  Chronic HCV infection, transaminitis   Recent HIV was negative   The LFTs are waxing waning  -monitor LFTs     8  Left upper extremity DVT-in the setting of PICC line use  Patient now on anticoagulation  -vascular follow-up  -serial exam  -anticoagulation    Discussed the above management plan in detail with the primary service    Antibiotics:  Cefazolin restart 15    Subjective:  Patient has no fever, chills, sweats; no nausea, vomiting, diarrhea; no cough, shortness of breath; still having right leg pain  No new symptoms  She developed some left upper extremity pain and was found to have a left upper extremity DVT  Objective:  Vitals:  Temp:  [98 1 °F (36 7 °C)-98 4 °F (36 9 °C)] 98 4 °F (36 9 °C)  HR:  [105-122] 108  Resp:  [18] 18  BP: (113-118)/(64-81) 117/68  SpO2:  [95 %-98 %] 98 %  Temp (24hrs), Av 2 °F (36 8 °C), Min:98 1 °F (36 7 °C), Max:98 4 °F (36 9 °C)  Current: Temperature: 98 4 °F (36 9 °C)    Physical Exam:   General Appearance:  Alert, interactive, nontoxic, no acute distress  Throat: Oropharynx moist without lesions  Lungs:   Clear to auscultation bilaterally; no wheezes, rhonchi or rales; respirations unlabored   Heart:  RRR; no murmur, rub or gallop   Abdomen:   Soft, non-tender, non-distended, positive bowel sounds  Extremities: No clubbing, cyanosis  Left upper extremity PICC line site without erythema but some mild swelling   Skin: No new rashes or lesions  No draining wounds noted         Labs, Imaging, & Other studies:   All pertinent labs and imaging studies were personally reviewed  Results from last 7 days   Lab Units 08/24/20  0453 08/21/20  0459 08/18/20  0529   WBC Thousand/uL 5 53 8 70 8 50   HEMOGLOBIN g/dL 10 2* 7 9* 7 3*   PLATELETS Thousands/uL 425* 478* 350     Results from last 7 days   Lab Units 08/24/20  0453 08/21/20  0459 08/18/20  0600   SODIUM mmol/L 139 137 137   POTASSIUM mmol/L 3 8 4 1 4 1   CHLORIDE mmol/L 103 101 105   CO2 mmol/L 30 29 26   BUN mg/dL 7 12 14   CREATININE mg/dL 0 49* 0 50* 0 59*   EGFR ml/min/1 73sq m 134 133 126   CALCIUM mg/dL 8 6 8 9 8 1*   AST U/L 30 78* 118*   ALT U/L 26 52 53   ALK PHOS U/L 160* 161* 145*

## 2020-08-24 NOTE — PROGRESS NOTES
Pt scheduled for cat scan at 1200 and rescheduled it per pt request for later in the day  Pt was agreeable to go down and now transport at bedside now to take pt and pt now refusing to go down to cat scan  Dr Jose Antonio Conde aware  Will reschedule for another time when pt is agreeable

## 2020-08-24 NOTE — ASSESSMENT & PLAN NOTE
Increase lovenox to 60mg Q12h  Due to severe pain will consult with vascular surgery for treatment options  May need picc removed to a different site  Picc continues to function well and post recent blood culture is negative  Elevation of LUE  PICC team attempted to move picc line to right arm given left arm DVT pain  Unsuccessful   IR consulted

## 2020-08-24 NOTE — ASSESSMENT & PLAN NOTE
· Patient is complaining of severe back pain mainly in the lower part of the back and also in the sacral region  · Patient had MRI of the lumbar and thoracic spine during the recent hospital stay in [facility] which was unremarkable  · - continue robaxin to 750 mg q 6  · - continue oxy to 10 mg and 15 mg   · - continue iv morphine 4 mg to q3 hrs prn for breakthrough  · -continue tylenol atc pt can refuse (she states it makes her break out in profuse sweats)   · - continue lidocaine patch  · -continuegabapentin to 300mg TID  · Iliacus muscle abscess is noted on CT scan, continue antibiotic treatment as above, no indication for drainage at this time  · Repeat CT scan shows improvement in septic emboli to iliacus muscle  · ketamine infusion discontinued  · Recommended CT scan of lumbar spine to evaluate for paraspinal or iliopsoas abscess, pt initially agreeable but refused when transportation arrived    · Possible MRI with sedation on Wednesday due to severe claustrophobia if no improvement with antibiotic treatment

## 2020-08-24 NOTE — PROCEDURES
Insert PICC line    Date/Time: 8/24/2020 11:34 AM  Performed by: Maximus Cardona RN  Authorized by: Sherren Pax, MD     Patient location:  Bedside  Other Assisting Provider: Yes (comment) [facility])    Consent:     Consent obtained:  Written (physician obtained)    Consent given by:  Patient    Procedural risks discussed: with MD   Susi protocol:     Procedure explained and questions answered to patient or proxy's satisfaction: yes      Relevant documents present and verified: yes      Test results available and properly labeled: yes      Radiology Images displayed and confirmed  If images not available, report reviewed: yes      Required blood products, implants, devices, and special equipment available: yes      Site/side marked: yes      Immediately prior to procedure, a time out was called: yes      Patient identity confirmed:  Verbally with patient  Pre-procedure details:     Hand hygiene: Hand hygiene performed prior to insertion      Sterile barrier technique: All elements of maximal sterile technique followed      Skin preparation:  ChloraPrep    Skin preparation agent: Skin preparation agent completely dried prior to procedure    Indications:     PICC line indications: new site    Anesthesia (see MAR for exact dosages): Anesthesia method:  Local infiltration    Local anesthetic:  Lidocaine 2% w/o epi (2 ml)  Procedure details:     Vessel type: vein      Laterality:  Right    Approach: percutaneous technique used      Patient position:  Flat    Procedural supplies:  Double lumen    Landmarks identified: yes      Ultrasound guidance: yes      Sterile ultrasound techniques: Sterile gel and sterile probe covers were used      Number of attempts:  2    Successful placement: no    Comments:      Attempted to access Basilic vein on rt arm, unable to obtain blood return, pt c/o pain at insertion site, needle removed    Attempted Brachial vein, blood return visualized, guide wire would not advance, needle removed

## 2020-08-24 NOTE — PROGRESS NOTES
Progress Note - Claude Hernandez 1990, 34 y o  female MRN: [id number]    Unit/Bed#: [facility] 425-01 Encounter: [id number]    Primary Care Provider: Benoit Valentine PA-C   Date and time admitted to hospital: 8/12/2020  7:39 PM        * Bacteremia due to Staphylococcus aureus  Assessment & Plan  · Recurrent MSSA bacteremia  · Initially admitted to [facility] from 07/15-blood culture were positive for MSSA bacteremia, but patient signed AMA on 07/22  Her repeat blood culture done on 7/21 was negative after 5 days  · 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 during the fast hospital stay from 7/15-7/22  · Plan is to continue IV cefazolin for a 6 week course per ID recommendations    Acute bacterial endocarditis  Assessment & Plan  · Patient noted to have tricuspid while vegetation on echocardiogram done in July  Patient had a transthoracic and also transesophageal echocardiogram done during the last hospital stay    · With septic emboli to lungs and posterior iliacus muscle  · Patient is transferred over here to be evaluated by CT surgery  · She was noncommittal to abstain from illegal drugs or unintended use of medications  · When she continues to abstain and completes iv abx treatment they would consider surgical correction   · Continue IV cefazolin as above    Septic embolism ([facility] [address] )  Assessment & Plan  · Patient noted to have abnormalities seen in the CT of the chest abdomen and pelvis concerning for septic emboli  · There is pleural effusion on the CT scan of the chest and also on repeat chest x-ray  · Patient is rather asymptomatic from pulmonary standpoint  · Continue with the antibiotics  · Thoracic surgery have evaluated the pt and she is not short of breath   · 8/15-imaging right hip shows no osseous involvement; concern for proximity of abscess status SI joint, but no SI joint involvement at this time  · Management as above under back pain    Back pain  Assessment & Plan  · Patient is complaining of severe back pain mainly in the lower part of the back and also in the sacral region  · Patient had MRI of the lumbar and thoracic spine during the recent hospital stay in [facility] which was unremarkable  · - continue robaxin to 750 mg q 6  · - continue oxy to 10 mg and 15 mg   · - continue iv morphine 4 mg to q3 hrs prn for breakthrough  · -continue tylenol atc pt can refuse (she states it makes her break out in profuse sweats)   · - continue lidocaine patch  · -continuegabapentin to 300mg TID  · Iliacus muscle abscess is noted on CT scan, continue antibiotic treatment as above, no indication for drainage at this time  · Repeat CT scan shows improvement in septic emboli to iliacus muscle  · ketamine infusion discontinued  · Recommended CT scan of lumbar spine to evaluate for paraspinal or iliopsoas abscess, pt initially agreeable but refused when transportation arrived  · Possible MRI with sedation on Wednesday due to severe claustrophobia if no improvement with antibiotic treatment    Intravenous drug abuse [facility])  Assessment & Plan  · Patient with acute drug abuse and likely the source of bacteremia  · Patient has a history of signing against medical advise  · May benefit from drug rehab eventually  · During the hospital stay in July of this year patient was positive for [facility] ,cocaine and opiates    Bipolar 1 disorder [facility])  Assessment & Plan  · Patient refused Psychiatry and neuropsych evaluation    DVT of axillary vein, acute left [facility])  Assessment & Plan  Increase lovenox to 60mg Q12h    Due to severe pain will consult with vascular surgery for treatment options  May need picc removed to a different site  Picc continues to function well and post recent blood culture is negative  Elevation of LUE  PICC team attempted to move picc line to right arm given left arm DVT pain  Unsuccessful  IR consulted         VTE Pharmacologic Prophylaxis:   Pharmacologic: Enoxaparin (Lovenox)  Mechanical VTE Prophylaxis in Place: Yes    Patient Centered Rounds: I have performed bedside rounds with nursing staff today  Discussions with Specialists or Other Care Team Provider: picc team, ID    Education and Discussions with Family / Patient: patient, plan of care    Time Spent for Care: 30 minutes  More than 50% of total time spent on counseling and coordination of care as described above  Current Length of Stay: 12 day(s)    Current Patient Status: Inpatient   Certification Statement: The patient will continue to require additional inpatient hospital stay due to complete endocarditis treatment    Discharge Plan: home when stable    Code Status: Level 1 - Full Code      Subjective:   Reports pain in right hip, gluteal area radiating to knee and left upper arm  Objective:     Vitals:   Temp (24hrs), Av 2 °F (36 8 °C), Min:98 1 °F (36 7 °C), Max:98 4 °F (36 9 °C)    Temp:  [98 1 °F (36 7 °C)-98 4 °F (36 9 °C)] 98 4 °F (36 9 °C)  HR:  [101-108] 101  Resp:  [18] 18  BP: (113-120)/(64-70) 120/70  SpO2:  [97 %-98 %] 98 %  Body mass index is 23 3 kg/m²  Input and Output Summary (last 24 hours): Intake/Output Summary (Last 24 hours) at 2020 1835  Last data filed at 2020 1822  Gross per 24 hour   Intake 740 ml   Output 1550 ml   Net -810 ml       Physical Exam:     Physical Exam  Constitutional:       Appearance: Normal appearance  HENT:      Head: Normocephalic and atraumatic  Mouth/Throat:      Mouth: Mucous membranes are dry  Eyes:      Extraocular Movements: Extraocular movements intact  Cardiovascular:      Rate and Rhythm: Normal rate and regular rhythm  Pulmonary:      Effort: Pulmonary effort is normal       Breath sounds: No wheezing or rales  Abdominal:      General: Abdomen is flat  Palpations: Abdomen is soft  Musculoskeletal:         General: No swelling  Right lower leg: No edema  Left lower leg: No edema  Neurological:      General: No focal deficit present  Mental Status: She is alert and oriented to person, place, and time  Comments: RLE flexing at hip spontaneously  dorsiflexion and plantar flexion is intact         Additional Data:     Labs:    Results from last 7 days   Lab Units 08/24/20  0453   WBC Thousand/uL 5 53   HEMOGLOBIN g/dL 10 2*   HEMATOCRIT % 32 5*   PLATELETS Thousands/uL 425*   NEUTROS PCT % 47   LYMPHS PCT % 40   MONOS PCT % 6   EOS PCT % 5     Results from last 7 days   Lab Units 08/24/20  0453   SODIUM mmol/L 139   POTASSIUM mmol/L 3 8   CHLORIDE mmol/L 103   CO2 mmol/L 30   BUN mg/dL 7   CREATININE mg/dL 0 49*   ANION GAP mmol/L 6   CALCIUM mg/dL 8 6   ALBUMIN g/dL 2 4*   TOTAL BILIRUBIN mg/dL 0 17*   ALK PHOS U/L 160*   ALT U/L 26   AST U/L 30   GLUCOSE RANDOM mg/dL 128                           * I Have Reviewed All Lab Data Listed Above  * Additional Pertinent Lab Tests Reviewed:  All Labs Within Last 24 Hours Reviewed        Recent Cultures (last 7 days):           Last 24 Hours Medication List:   Current Facility-Administered Medications   Medication Dose Route Frequency Provider Last Rate    calcium carbonate  1,000 mg Oral Daily PRN Dari Gonzales MD      cefazolin  2,000 mg Intravenous Q8H Asha Roca PA-C 2,000 mg (08/24/20 1607)    enoxaparin  1 mg/kg Subcutaneous Q12H Jaja Madsen MD      gabapentin  300 mg Oral TID Malaika Seals MD      glycopyrrolate  0 2 mg Intravenous Q4H PRN Bonnie Dancer, PA-C      haloperidol lactate  2 mg Intramuscular Q30 Min PRN Bonnie Dancer, PA-C      lidocaine  2 patch Topical Daily Asha Roca PA-C      LORazepam  2 mg Intravenous Q3H PRN Hayden Wilkins MD      magnesium citrate  296 mL Oral Daily PRN Ashlie Negron MD      melatonin  6 mg Oral HS PRN Asha Roca PA-C      methocarbamol  750 mg Oral Q6H [address] Hayden Wilkins MD      methylnaltrexone bromide  12 mg Subcutaneous Q48H PRN Hayden Wilkins MD      mirtazapine  30 mg Oral HS Carmel Soares MD      morphine injection  4 mg Intravenous Q3H PRN Hayden Wilkins MD      morphine injection  4 mg Intravenous Once PRN Hayden Wilkins MD      ondansetron  4 mg Intravenous Q6H PRN Carmel Soares MD      oxyCODONE  10 mg Oral Q4H PRN BOO Kumar      oxyCODONE  15 mg Oral Q4H PRN BOO Kumar      saccharomyces boulardii  250 mg Oral BID Ashlie Negron MD      senna  1 tablet Oral BID Ashlie Negron MD          Today, Patient Was Seen By: Irvin Berg MD    ** Please Note: Dictation voice to text software may have been used in the creation of this document   **

## 2020-08-24 NOTE — PROGRESS NOTES
PICC consult received to replace PICC in rt arm as pt now has DVT in MINISTERIO where current PICC line is placed  Attempted rt basilic vein, unable to obtain blood return and pt c/o pain at site  Rt brachial vein accessed, however guide wire would not advance, unable to remove just guide wire and needle needed to be removed    Did not see any other veins to access at this point, explained to bedside RN that pt would need to go to IR

## 2020-08-25 ENCOUNTER — APPOINTMENT (INPATIENT)
Dept: RADIOLOGY | Facility: HOSPITAL | Age: 30
DRG: 163 | End: 2020-08-25
Payer: COMMERCIAL

## 2020-08-25 LAB
ANION GAP SERPL CALCULATED.3IONS-SCNC: 6 MMOL/L (ref 4–13)
BASOPHILS # BLD AUTO: 0.03 THOUSANDS/ΜL (ref 0–0.1)
BASOPHILS NFR BLD AUTO: 1 % (ref 0–1)
BUN SERPL-MCNC: 8 MG/DL (ref 5–25)
CALCIUM SERPL-MCNC: 8.7 MG/DL (ref 8.3–10.1)
CHLORIDE SERPL-SCNC: 102 MMOL/L (ref 100–108)
CO2 SERPL-SCNC: 30 MMOL/L (ref 21–32)
CREAT SERPL-MCNC: 0.48 MG/DL (ref 0.6–1.3)
EOSINOPHIL # BLD AUTO: 0.32 THOUSAND/ΜL (ref 0–0.61)
EOSINOPHIL NFR BLD AUTO: 6 % (ref 0–6)
ERYTHROCYTE [DISTWIDTH] IN BLOOD BY AUTOMATED COUNT: 15.1 % (ref 11.6–15.1)
GFR SERPL CREATININE-BSD FRML MDRD: 135 ML/MIN/1.73SQ M
GLUCOSE SERPL-MCNC: 97 MG/DL (ref 65–140)
HCT VFR BLD AUTO: 28.8 % (ref 34.8–46.1)
HGB BLD-MCNC: 9.1 G/DL (ref 11.5–15.4)
IMM GRANULOCYTES # BLD AUTO: 0.05 THOUSAND/UL (ref 0–0.2)
IMM GRANULOCYTES NFR BLD AUTO: 1 % (ref 0–2)
LYMPHOCYTES # BLD AUTO: 2.36 THOUSANDS/ΜL (ref 0.6–4.47)
LYMPHOCYTES NFR BLD AUTO: 40 % (ref 14–44)
MCH RBC QN AUTO: 26.8 PG (ref 26.8–34.3)
MCHC RBC AUTO-ENTMCNC: 31.6 G/DL (ref 31.4–37.4)
MCV RBC AUTO: 85 FL (ref 82–98)
MONOCYTES # BLD AUTO: 0.44 THOUSAND/ΜL (ref 0.17–1.22)
MONOCYTES NFR BLD AUTO: 8 % (ref 4–12)
NEUTROPHILS # BLD AUTO: 2.66 THOUSANDS/ΜL (ref 1.85–7.62)
NEUTS SEG NFR BLD AUTO: 44 % (ref 43–75)
NRBC BLD AUTO-RTO: 0 /100 WBCS
PLATELET # BLD AUTO: 446 THOUSANDS/UL (ref 149–390)
PMV BLD AUTO: 8.3 FL (ref 8.9–12.7)
POTASSIUM SERPL-SCNC: 4.1 MMOL/L (ref 3.5–5.3)
RBC # BLD AUTO: 3.39 MILLION/UL (ref 3.81–5.12)
SODIUM SERPL-SCNC: 138 MMOL/L (ref 136–145)
WBC # BLD AUTO: 5.86 THOUSAND/UL (ref 4.31–10.16)

## 2020-08-25 PROCEDURE — 99232 SBSQ HOSP IP/OBS MODERATE 35: CPT | Performed by: INTERNAL MEDICINE

## 2020-08-25 PROCEDURE — 80048 BASIC METABOLIC PNL TOTAL CA: CPT | Performed by: INTERNAL MEDICINE

## 2020-08-25 PROCEDURE — 85025 COMPLETE CBC W/AUTO DIFF WBC: CPT | Performed by: INTERNAL MEDICINE

## 2020-08-25 RX ORDER — DULOXETIN HYDROCHLORIDE 30 MG/1
30 CAPSULE, DELAYED RELEASE ORAL DAILY
Status: COMPLETED | OUTPATIENT
Start: 2020-08-25 | End: 2020-08-31

## 2020-08-25 RX ADMIN — CEFAZOLIN SODIUM 2000 MG: 2 SOLUTION INTRAVENOUS at 00:02

## 2020-08-25 RX ADMIN — LORAZEPAM 2 MG: 2 INJECTION INTRAMUSCULAR; INTRAVENOUS at 21:24

## 2020-08-25 RX ADMIN — LORAZEPAM 2 MG: 2 INJECTION INTRAMUSCULAR; INTRAVENOUS at 05:24

## 2020-08-25 RX ADMIN — GABAPENTIN 300 MG: 300 CAPSULE ORAL at 16:54

## 2020-08-25 RX ADMIN — OXYCODONE HYDROCHLORIDE 15 MG: 10 TABLET ORAL at 08:35

## 2020-08-25 RX ADMIN — GABAPENTIN 300 MG: 300 CAPSULE ORAL at 08:35

## 2020-08-25 RX ADMIN — METHOCARBAMOL TABLETS 750 MG: 750 TABLET, COATED ORAL at 00:02

## 2020-08-25 RX ADMIN — CEFAZOLIN SODIUM 2000 MG: 2 SOLUTION INTRAVENOUS at 08:46

## 2020-08-25 RX ADMIN — MORPHINE SULFATE 4 MG: 4 INJECTION INTRAVENOUS at 18:17

## 2020-08-25 RX ADMIN — OXYCODONE HYDROCHLORIDE 15 MG: 10 TABLET ORAL at 04:00

## 2020-08-25 RX ADMIN — GABAPENTIN 300 MG: 300 CAPSULE ORAL at 21:22

## 2020-08-25 RX ADMIN — ONDANSETRON 4 MG: 2 INJECTION INTRAMUSCULAR; INTRAVENOUS at 18:26

## 2020-08-25 RX ADMIN — METHOCARBAMOL TABLETS 750 MG: 750 TABLET, COATED ORAL at 12:28

## 2020-08-25 RX ADMIN — DULOXETINE HYDROCHLORIDE 30 MG: 30 CAPSULE, DELAYED RELEASE ORAL at 12:28

## 2020-08-25 RX ADMIN — MORPHINE SULFATE 4 MG: 4 INJECTION INTRAVENOUS at 13:44

## 2020-08-25 RX ADMIN — MORPHINE SULFATE 4 MG: 4 INJECTION INTRAVENOUS at 10:04

## 2020-08-25 RX ADMIN — Medication 250 MG: at 08:35

## 2020-08-25 RX ADMIN — CEFAZOLIN SODIUM 2000 MG: 2 SOLUTION INTRAVENOUS at 16:54

## 2020-08-25 RX ADMIN — OXYCODONE HYDROCHLORIDE 15 MG: 10 TABLET ORAL at 16:54

## 2020-08-25 RX ADMIN — MORPHINE SULFATE 4 MG: 4 INJECTION INTRAVENOUS at 02:17

## 2020-08-25 RX ADMIN — LORAZEPAM 2 MG: 2 INJECTION INTRAMUSCULAR; INTRAVENOUS at 10:03

## 2020-08-25 RX ADMIN — ENOXAPARIN SODIUM 60 MG: 60 INJECTION SUBCUTANEOUS at 21:24

## 2020-08-25 RX ADMIN — ONDANSETRON 4 MG: 2 INJECTION INTRAMUSCULAR; INTRAVENOUS at 10:04

## 2020-08-25 RX ADMIN — ENOXAPARIN SODIUM 60 MG: 60 INJECTION SUBCUTANEOUS at 09:10

## 2020-08-25 RX ADMIN — MIRTAZAPINE 30 MG: 30 TABLET, FILM COATED ORAL at 21:23

## 2020-08-25 RX ADMIN — CALCIUM CARBONATE (ANTACID) CHEW TAB 500 MG 1000 MG: 500 CHEW TAB at 21:49

## 2020-08-25 RX ADMIN — OXYCODONE HYDROCHLORIDE 15 MG: 10 TABLET ORAL at 00:02

## 2020-08-25 RX ADMIN — LORAZEPAM 2 MG: 2 INJECTION INTRAMUSCULAR; INTRAVENOUS at 02:17

## 2020-08-25 RX ADMIN — MORPHINE SULFATE 4 MG: 4 INJECTION INTRAVENOUS at 05:24

## 2020-08-25 RX ADMIN — LORAZEPAM 2 MG: 2 INJECTION INTRAMUSCULAR; INTRAVENOUS at 13:45

## 2020-08-25 RX ADMIN — LORAZEPAM 2 MG: 2 INJECTION INTRAMUSCULAR; INTRAVENOUS at 18:26

## 2020-08-25 RX ADMIN — METHOCARBAMOL TABLETS 750 MG: 750 TABLET, COATED ORAL at 05:24

## 2020-08-25 RX ADMIN — OXYCODONE HYDROCHLORIDE 15 MG: 10 TABLET ORAL at 12:31

## 2020-08-25 RX ADMIN — OXYCODONE HYDROCHLORIDE 15 MG: 10 TABLET ORAL at 21:21

## 2020-08-25 NOTE — ASSESSMENT & PLAN NOTE
· Patient is complaining of severe back pain mainly in the lower part of the back and also in the sacral region  · Patient had MRI of the lumbar and thoracic spine during the recent hospital stay in [facility] which was unremarkable  · - continue robaxin to 750 mg q 6  · - continue oxy to 10 mg and 15 mg   · - continue iv morphine 4 mg to q3 hrs prn for breakthrough  · -continue tylenol atc pt can refuse (she states it makes her break out in profuse sweats)   · - continue lidocaine patch  · -continue gabapentin to 300mg TID  · Iliacus muscle abscess is noted on CT scan, continue antibiotic treatment as above, no indication for drainage at this time  · Repeat CT scan shows improvement in septic emboli to iliacus muscle  · ketamine infusion discontinued  · Recommended CT scan of lumbar spine to evaluate for paraspinal or iliopsoas abscess, pt initially agreeable but refused when transportation arrived    · Possible MRI with sedation on Wednesday due to severe claustrophobia if no improvement with antibiotic treatment

## 2020-08-25 NOTE — ASSESSMENT & PLAN NOTE
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

## 2020-08-25 NOTE — PROGRESS NOTES
Progress Note - Nikky Colindres 34 y o  female MRN: [id number]    Unit/Bed#: [facility] 425-01 Encounter: [id number]      Assessment:  Bacteremia due to Staphylococcus aureus  Acute bacterial endocarditis  Septic embolism  Back pain  IV Drug use   Bipolar 1 disorder  DVT of axillary vein, acute left   Chronic HCV infection    Plan:  Bacteremia due to Staphylococcus aureus: Recurrent MSSA bacteremia  Initially admitted to [address] [place] [address] on 07/15/20  She was found to have positive blood cultures  They turned out to be negative on 07/21, and she left AMA on 07/22  She then presented to [facility] on on 07/30/20, and was found to have positive blood cultures again  The bacteremia cleared on 08/02 and she left AMA on 08/04  She was readmitted to [address] [place] [address] on 08/10 with positive blood cultures for Staph Aureus  She was transferred here to be evaluated by cardiothoracic surgery for tricuspid valve regurgitation  During hospital stay of 07/15- 07/22, she was found to have tricuspid valve endocarditis and septic emboli  Recheck blood cultures x2 if patient will allow  WBC today 5 86  Continue IV cefazolin per ID recommendations  Acute bacterial endocarditis: 2D echocardiogram on 07/21 showed vegetations on the right atrial aspect  Patient also had transthoracic and transesophageal echocardiogram performed  Concern of tricuspid valve regurgitation  Septic emboli to lungs and posterior iliacus muscle  She was evaluated by cardiothoracic surgery at [facility] for her tricuspid regurgitation  They recommended not performing surgery, unless the patient can demonstrate ability to abstain from drug use  Continue IV cefazolin for 6 weeks as recommended by ID  Septic embolism: CT of chest, abdomen and pelvis concerning for septic emboli  Pleural effusion on CT of chest and on repeat X-ray noted as well  Patient reports no shortness of breath, however  IV cefazolin for 6 weeks   Septic emboli to posterior iliacus muscle found as well  This is small and unlikely to be drained  CT abdomen and pelvis with contrast pending 08/25/20 in order to look for evidence of iliopsoas abscess, or other retroperitoneal source of pain  Back pain: Patient complaining of lower back pain, as well as in the sacral region  MRI of lumbar and thoracic spine at recent hospital stay in [facility] (08/01/2020) was unremarkable  It was negative for abscesses or osteomyelitis  APS following and managing pain  Back pain likely secondary to chronic pain and opoid dependence  Iliacus muscle abscess visualized on CT scan  Repeat Ct showed improvement in septic emboli to iliacus muscle  Plan for IV cefazolin for 6 weeks  Patient refused CT scan of lumbar spine to evaluate for paraspinal or iliopsoas abscess  Possible MRI on Wednesday? Patient very claustrophobic  IV Drug use: Drug abuse likely source of bacteremia  Patient was positive for THC, cocaine,and opiates during hospital stay in July of this year  Recent HIV was negative  Patient not candidate for home PICC line/IV antibiotics  Possible drug rehab? Bipolar 1 disorder: Patient refused psych and neuropsych evaluation  DVT of axillary vein, acute left: Patient developed severe pain in LUE  Duplex ultrasound showed acute non occlusive DVT in axillary and subclavian veins and acute superficial thrombophelebitis in basilic vein  Lovenox increased to 60 mg Q12 hours  Vascular surgery and IR following  New PICC line should be placed per IR  Current line was placed in an urgent manner and not prior to confirming clearance of bacteremia  PICC line placement will be planned and a culture of the tip will be done  Chronic HCV infection: Monitor LFT's  LFT's waxing and waning  Recent HIV negative  Subjective:   Patient evaluated at bedside this AM  Patient complains of severe pain throughout her back  She explains that she thinks she is getting worse  However, she explains that her left arm feels a bit better than it did yesterday  Patient is requesting better management of her pain  Patient denies any chest pain or shortness of breath  She admits to having some nausea, fevers, and chills from time to time  She denies any vomiting  Objective:   Vitals: Blood pressure 109/69, pulse 104, temperature 98 5 °F (36 9 °C), temperature source Oral, resp  rate 18, height 5' 5" (1 651 m), weight 63 5 kg (139 lb 15 9 oz), SpO2 96 %, not currently breastfeeding  ,Body mass index is 23 3 kg/m²  Intake/Output Summary (Last 24 hours) at 8/25/2020 1441  Last data filed at 8/25/2020 1250  Gross per 24 hour   Intake 620 ml   Output 1800 ml   Net -1180 ml       Physical Exam:   Physical Exam  Constitutional:       Appearance: She is normal weight  HENT:      Head: Normocephalic  Eyes:      Extraocular Movements: Extraocular movements intact  Pupils: Pupils are equal, round, and reactive to light  Cardiovascular:      Rate and Rhythm: Normal rate and regular rhythm  Pulmonary:      Effort: Pulmonary effort is normal       Breath sounds: Normal breath sounds  Abdominal:      General: Bowel sounds are normal       Palpations: Abdomen is soft  Skin:     General: Skin is warm and dry  Neurological:      General: No focal deficit present  Mental Status: She is alert  Psychiatric:      Comments: Anxious          Invasive Devices     Peripherally Inserted Central Catheter Line            PICC Line 97/81/25 Left Basilic 9 days                Lab, Imaging and other studies: I have personally reviewed pertinent reports      VTE Pharmacologic Prophylaxis: Enoxaparin (Lovenox)  VTE Mechanical Prophylaxis: sequential compression device

## 2020-08-25 NOTE — PROGRESS NOTES
Progress Note - Anabell Friday 1990, 34 y o  female MRN: [id number]    Unit/Bed#: [facility] 425-01 Encounter: [id number]    Primary Care Provider: Michelle Block PA-C   Date and time admitted to hospital: 8/12/2020  7:39 PM     Addendum:  Discussed with patient at length concerns over noncompliance with testing  Explained that we will follow recommendations outlined by acute pain service  She now reports that she is interested in host and that she never said no to wanting to stop substance abuse  Patient very upset with my conversation regarding substance abuse  Explained concerned about infection and potential life-threatening complications associated with the bacteremia  Intravenous drug abuse [facility])  Assessment & Plan  · Patient with history of drug abuse and likely the source of bacteremia  · Patient has a history of signing against medical advise  · May benefit from drug rehab eventually  · During the hospital stay in July of this year patient was positive for [facility] ,cocaine and opiates    Back pain  Assessment & Plan  · Patient is complaining of severe back pain mainly in the lower part of the back and also in the sacral region  · Patient had MRI of the lumbar and thoracic spine during the recent hospital stay in [facility] which was unremarkable  · - continue robaxin to 750 mg q 6  · - continue oxy to 10 mg and 15 mg   · - continue iv morphine 4 mg to q3 hrs prn for breakthrough  · -continue tylenol atc pt can refuse (she states it makes her break out in profuse sweats)   · - continue lidocaine patch  · -continue gabapentin to 300mg TID  · Iliacus muscle abscess is noted on CT scan, continue antibiotic treatment as above, no indication for drainage at this time  · Repeat CT scan shows improvement in septic emboli to iliacus muscle    · ketamine infusion discontinued  · Recommended CT scan of lumbar spine to evaluate for paraspinal or iliopsoas abscess, pt initially agreeable but refused when transportation arrived  · Possible MRI with sedation on Wednesday due to severe claustrophobia if no improvement with antibiotic treatment    Bipolar 1 disorder [facility])  Assessment & Plan  · Patient refused Psychiatry and neuropsych evaluation    Septic embolism [facility])  Assessment & Plan  · Patient noted to have abnormalities seen in the CT of the chest abdomen and pelvis concerning for septic emboli  · There is pleural effusion on the CT scan of the chest and also on repeat chest x-ray  · Patient is rather asymptomatic from pulmonary standpoint  · Continue with the antibiotics  · Thoracic surgery have evaluated the pt and she is not short of breath   · 8/15-imaging right hip shows no osseous involvement; concern for proximity of abscess status SI joint, but no SI joint involvement at this time  · Management as above under back pain    Acute bacterial endocarditis  Assessment & Plan  · Patient noted to have tricuspid while vegetation on echocardiogram done in July  Patient had a transthoracic and also transesophageal echocardiogram done during the last hospital stay  · With septic emboli to lungs and posterior iliacus muscle  · Patient is transferred over here to be evaluated by CT surgery  · She was noncommittal to abstain from illegal drugs or unintended use of medications  · When she continues to abstain and completes iv abx treatment they would consider surgical correction   · Continue IV cefazolin as above    * Bacteremia due to Staphylococcus aureus  Assessment & Plan  · Recurrent MSSA bacteremia  · Initially admitted to [address] [place] [address] from 07/15-blood culture were positive for MSSA bacteremia, but patient signed AMA on 07/22  Her repeat blood culture done on 7/21 was negative after 5 days  · [facility] on 07/30-blood culture came back positive for MSSA and she left again is medical advice on 08/04    Her blood cultures came back positive on 07/30, but the bacteremia cleared as of   Patient left AMA on   · Readmitted to [address] [place] [address] on 08/10-blood culture came back positive for Staph aureus  · 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 during the last hospital stay from 7/15-  · Plan is to continue IV cefazolin for a 6 week course per ID recommendations      VTE Pharmacologic Prophylaxis:   Pharmacologic: Enoxaparin (Lovenox)  Mechanical VTE Prophylaxis in Place: No    Patient Centered Rounds: I have performed bedside rounds with nursing staff today  Time Spent for Care: 15 minutes  More than 50% of total time spent on counseling and coordination of care as described above  Current Length of Stay: 13 day(s)    Current Patient Status: Inpatient   Certification Statement: The patient will continue to require additional inpatient hospital stay due to Need to monitor symptoms        Code Status: Level 1 - Full Code      Subjective:   No acute distress    Objective:     Vitals:   Temp (24hrs), Av 4 °F (36 3 °C), Min:97 4 °F (36 3 °C), Max:97 4 °F (36 3 °C)    Temp:  [97 4 °F (36 3 °C)] 97 4 °F (36 3 °C)  HR:  [101] 101  Resp:  [18] 18  BP: (113-120)/(55-70) 113/55  SpO2:  [99 %] 99 %  Body mass index is 23 3 kg/m²  Input and Output Summary (last 24 hours): Intake/Output Summary (Last 24 hours) at 2020 0933  Last data filed at 2020 0846  Gross per 24 hour   Intake 500 ml   Output 2000 ml   Net -1500 ml       Physical Exam:     Physical Exam  Constitutional:       Appearance: Normal appearance  HENT:      Head: Normocephalic and atraumatic  Cardiovascular:      Heart sounds: No murmur  Pulmonary:      Effort: Pulmonary effort is normal       Breath sounds: Normal breath sounds  Abdominal:      General: Abdomen is flat  Palpations: Abdomen is soft  Musculoskeletal:         General: No swelling or tenderness  Skin:     General: Skin is warm and dry  Coloration: Skin is not jaundiced  Neurological:      General: No focal deficit present  Mental Status: She is alert and oriented to person, place, and time  Additional Data:     Labs:    Results from last 7 days   Lab Units 08/25/20  0527   WBC Thousand/uL 5 86   HEMOGLOBIN g/dL 9 1*   HEMATOCRIT % 28 8*   PLATELETS Thousands/uL 446*   NEUTROS PCT % 44   LYMPHS PCT % 40   MONOS PCT % 8   EOS PCT % 6     Results from last 7 days   Lab Units 08/25/20  0527 08/24/20  0453   POTASSIUM mmol/L 4 1 3 8   CHLORIDE mmol/L 102 103   CO2 mmol/L 30 30   BUN mg/dL 8 7   CREATININE mg/dL 0 48* 0 49*   CALCIUM mg/dL 8 7 8 6   ALK PHOS U/L  --  160*   ALT U/L  --  26   AST U/L  --  30           * I Have Reviewed All Lab Data Listed Above  * Additional Pertinent Lab Tests Reviewed:  All Labs Within Last 24 Hours Reviewed        Recent Cultures (last 7 days):           Last 24 Hours Medication List:   Current Facility-Administered Medications   Medication Dose Route Frequency Provider Last Rate    calcium carbonate  1,000 mg Oral Daily PRN Carmel Soares MD      cefazolin  2,000 mg Intravenous Q8H Asha Roca PA-C 2,000 mg (08/25/20 0846)    enoxaparin  1 mg/kg Subcutaneous Q12H [address] Hayden Wilkins MD      gabapentin  300 mg Oral TID Hayden Wilkins MD      glycopyrrolate  0 2 mg Intravenous Q4H PRN Osvaldo Brown PA-C      haloperidol lactate  2 mg Intramuscular Q30 Min PRN Osvaldo Brown PA-C      lidocaine  2 patch Topical Daily Asha Roca PA-C      LORazepam  2 mg Intravenous Q3H PRN Hayden Wilkins MD      magnesium citrate  296 mL Oral Daily PRN Ashlie Negron MD      melatonin  6 mg Oral HS PRN Asha Roca PA-C      methocarbamol  750 mg Oral Q6H [address] Hayden iWlkins MD      methylnaltrexone bromide  12 mg Subcutaneous Q48H PRN Hayden Wilkins MD      mirtazapine  30 mg Oral HS Carmel Soares MD      morphine injection  4 mg Intravenous Q3H PRN Hayden Wilkins MD      morphine injection  4 mg Intravenous Once PRN Elieser Harrington MD      ondansetron  4 mg Intravenous Q6H PRN Jose Perez MD      oxyCODONE  10 mg Oral Q4H PRN BOO Granger      oxyCODONE  15 mg Oral Q4H PRN BOO Granger      saccharomyces boulardii  250 mg Oral BID Rajat Gupta MD      senna  1 tablet Oral BID Rajat Gupta MD          Today, Patient Was Seen By: Ester Daley DO    ** Please Note: Dictation voice to text software may have been used in the creation of this document   **

## 2020-08-25 NOTE — PROGRESS NOTES
Progress Note - Infectious Disease   Geneva Wilkinson 34 y o  female MRN: [id number]  Unit/Bed#: [facility] 425-01 Encounter: [id number]      Impression/Plan:  1  Recurrent/persistent MSSA bacteremia with TV infective endocarditis   Blood cultures from 8/10 remain positive   Prolonged course of IV antibiotic was previously recommended, but patient has left AMA on 2 prior occasions (once at [facility] and once from [facility])    She remains hemodynamically stable despite her ongoing bacteremia  Repeat blood cultures negative     -continue cefazolin for now at current dose  -recheck blood cultures x2 sets if patient will allow  -follow up repeat blood cultures  -recheck CBC with diff and CMP  -replace PICC line as it was placed before confirmation of clearance of the bacteremia     2  Tricuspid valve endocarditis, 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  7/21 MELISSA showed large vegetation on TV with severe regurgitation  7/15 CT abdomen/pelvis also showed bilateral renal parenchymal abnormalities concerning for septic emboli   No intra-abdominal abscess     -antibiotic plan as above  -CT surgery is following      3  Right iliacus muscle abscesses-very small and unlikely to be able to be drained   At the level of the mid SI joint but the SI joint appears okay   Now with increasing pain of unclear significance   Perhaps the infection is now spread into the SI joint although this is not overtly seen on CT scan   Her increased pain may be more related to the decreased morphine dose    However she continues to have severe pain   -antibiotics as above  -pain management  -check CT the abdomen pelvis to look for evidence of iliopsoas abscess, or other retroperitoneal source of pain     4  Recent left foot cellulitis with abscess   Likely site of injection of IV drugs versus ectopic foci in the setting of MSSA bacteremia   This appears to have healed with no evidence of active infection   -antibiotic plan as above      5  Back pain   More likely secondary to chronic pain, opioid dependence   20 Thoracic and lumbar spine MRIs performed at [facility] negative were for abscess or osteomyelitis     -monitor back pain   -check CT lumbar spine  -serial exams  -acute pain service follow-up     6  Active IV heroin abuse   This is the causative factor for development of bacteremia and infective endocarditis   Patient has left AMA on prior occasions   HIV screen negative  -patient is not a candidate for at home PICC line/IV antibiotics  -acute pain service follow-up     7  Chronic HCV infection, transaminitis   Recent HIV was negative   The LFTs are waxing waning  -monitor LFTs      8  Left upper extremity DVT-in the setting of PICC line use  Patient now on anticoagulation  -vascular follow-up  -serial exam  -anticoagulation    Have discussed the above management plan in detail with the primary service and interventional Radiology    Antibiotics:  Cefazolin restart 16    Subjective:  Patient has no fever, chills, sweats; no nausea, vomiting, diarrhea; no cough, shortness of breath; still struggling with pain pain  Ketamine infusion was discontinued  No new symptoms  Objective:  Vitals:  Temp:  [97 4 °F (36 3 °C)] 97 4 °F (36 3 °C)  HR:  [101] 101  Resp:  [18] 18  BP: (113-120)/(55-70) 113/55  SpO2:  [99 %] 99 %  Temp (24hrs), Av 4 °F (36 3 °C), Min:97 4 °F (36 3 °C), Max:97 4 °F (36 3 °C)  Current: Temperature: (!) 97 4 °F (36 3 °C)    Physical Exam:   General Appearance:  Alert, interactive, nontoxic, no acute distress  Throat: Oropharynx moist without lesions  Lungs:   Clear to auscultation bilaterally; no wheezes, rhonchi or rales; respirations unlabored   Heart:  Tachycardic; no murmur, rub or gallop   Abdomen:   Soft, non-tender, non-distended, positive bowel sounds  Extremities: No clubbing, cyanosis  Left upper extremity with PICC line in place    Able to move all 4 extremities without difficulty Skin: No new rashes or lesions  No draining wounds noted         Labs, Imaging, & Other studies:   All pertinent labs and imaging studies were personally reviewed  Results from last 7 days   Lab Units 08/25/20 0527 08/24/20 0453 08/21/20  0459   WBC Thousand/uL 5 86 5 53 8 70   HEMOGLOBIN g/dL 9 1* 10 2* 7 9*   PLATELETS Thousands/uL 446* 425* 478*     Results from last 7 days   Lab Units 08/25/20 0527 08/24/20 0453 08/21/20  0459   SODIUM mmol/L 138 139 137   POTASSIUM mmol/L 4 1 3 8 4 1   CHLORIDE mmol/L 102 103 101   CO2 mmol/L 30 30 29   BUN mg/dL 8 7 12   CREATININE mg/dL 0 48* 0 49* 0 50*   EGFR ml/min/1 73sq m 135 134 133   CALCIUM mg/dL 8 7 8 6 8 9   AST U/L  --  30 78*   ALT U/L  --  26 52   ALK PHOS U/L  --  160* 161*

## 2020-08-25 NOTE — ASSESSMENT & PLAN NOTE
· Patient with history of drug abuse and likely the source of bacteremia    · Patient has a history of signing against medical advise  · May benefit from drug rehab eventually  · During the hospital stay in July of this year patient was positive for [facility] ,cocaine and opiates

## 2020-08-25 NOTE — TELEMEDICINE
INTERPROFESSIONAL (PHONE) CONSULTATION - Interventional Radiology  Sherine Mas 34 y o  female MRN: [id number]  Unit/Bed#: [facility] 425-01 Encounter: [id number]    IR has been consulted to evaluate the patient, determine the appropriate procedure, and whether or not a procedure can and should be performed regarding the care of Sherine Mas  We were consulted by SLIM concerning DVT in arm with PICC, and to possibly perform a PICC line removal and replacement on right side if medically appropriate for the patient  IP Consult to IR  Consult performed by: BOO Velasquez  Consult ordered by: Emmanuelle Burnette MD        08/25/20      Assessment/Recommendation:      24-year-old female who transferred from [place] [address] for CT surgery evaluation of tricuspid valve endocarditis  She developed left upper extremity pain and the duplex showed a nonocclusive DVT in the axillary and subclavian veins and acute superficial thrombophlebitis in the basilic vein  Please refer to vascular surgery consult  If picc line is functioning well, flushes and aspirates, and patient has a minimal swelling and pain that can be controlled, there is no indication to remove the PICC line and place one on the opposite side  Discussed with Dr Kishor Marino, would like a new PICC line, current line was placed urgently and not before confirming clearance of bacteremia  Will plan for PICC line placement and a culture of the tip  Total time spent in review of data, discussion with requesting provider and rendering advice was 10 minutes       Patient or appropriate family member was verbally informed by SLIM of this consultative service on their behalf to provide more timely access to specialty care in lieu of an in person consultation   They were informed it is a billable service unless the it was determined an in person follow up was medically necessary by me within the next 14 days at which time only the in person consult would be billed  Verbal consent was obtained  Thank you for allowing Interventional Radiology to participate in the care of [facility]  Please don't hesitate to call or TigerText us with any questions       [address] [place] [address] Pat Gonzáles

## 2020-08-25 NOTE — PROGRESS NOTES
Progress Note - Acute Pain Service    Gleda Ahumada 34 y o  female MRN: [id number]  Unit/Bed#: [facility] 425-01 Encounter: [id number]      Assessment:   Principal Problem:    Bacteremia due to Staphylococcus aureus  Active Problems:    Acute bacterial endocarditis    Septic embolism (HCC)    Bipolar 1 disorder (HCC)    Back pain    Intravenous drug abuse ([address] )    DVT of axillary vein, acute left (HCC)      Plan:   · Suggest add duloxetine 30 mg p o  daily scheduled x1 week, then increase to 60 mg p o  daily scheduled  · Continue oxycodone 10 mg p o  q 4 hours p r n  moderate pain  · Continue oxycodone 15 mg p o  q 4 hours p r n  severe pain  · Continue morphine 4 mg IV q 3 hours p r n  breakthrough pain, plan to decrease to q 4 hours tomorrow  · Suggest decrease lorazepam to 1 mg IV q 3 hours p r n  agitation  · Continue gabapentin 300 mg p o  t i d  scheduled  · Continue methocarbamol 750 mg p o  q 6 hours scheduled  · Continue lidocaine 5% patch x2 for 12 hours daily  · Continue bowel regimen to avoid opioid induced constipation  APS will continue to follow; please contact [facility]PS ( btn [phone]) with any further questions    Pain History  Current pain location(s):  Lower back, right hip  Pain Scale:   10/10  Quality:  Aching  24 hour history:  Patient continues to complain of severe pain although had to be woken up in order to elicit her pain score  Patient refused CT scan of the lumbar spine yesterday secondary to pain which she states precluded her from moving to the stretcher and tolerating the procedure  Patient has been standing several times throughout the day and has been dressing herself  During my interview, patient continued to complain of significant pain in her hip and lower back however continue to move her legs around the bed and push the covers to the bottom of the bed without difficulty  Patient requesting ketamine be restarted    Patient is also mention several times that she is due for her morphine and Ativan  Patient was advised that we will be starting Cymbalta today and that the ketamine will not be reinstituted  Also again discussed with patient the likelihood of opioid induced hyperalgesia and that reducing opioids would be the appropriate treatment at this point and that adding opioids would be more detrimental than helpful  Opioid requirement previous 24 hours:  Hydromorphone 24 mg IV, oxycodone 75 mg p o      Meds/Allergies   all current active meds have been reviewed, current meds:   Current Facility-Administered Medications   Medication Dose Route Frequency    calcium carbonate (TUMS) chewable tablet 1,000 mg  1,000 mg Oral Daily PRN    ceFAZolin (ANCEF) IVPB (premix) 2,000 mg 50 mL  2,000 mg Intravenous Q8H    enoxaparin (LOVENOX) subcutaneous injection 60 mg  1 mg/kg Subcutaneous Q12H [address]    gabapentin (NEURONTIN) capsule 300 mg  300 mg Oral TID    glycopyrrolate (ROBINUL) injection 0 2 mg  0 2 mg Intravenous Q4H PRN    haloperidol lactate (HALDOL) injection 2 mg  2 mg Intramuscular Q30 Min PRN    lidocaine (LIDODERM) 5 % patch 2 patch  2 patch Topical Daily    LORazepam (ATIVAN) injection 2 mg  2 mg Intravenous Q3H PRN    magnesium citrate (CITROMA) oral solution 296 mL  296 mL Oral Daily PRN    melatonin tablet 6 mg  6 mg Oral HS PRN    methocarbamol (ROBAXIN) tablet 750 mg  750 mg Oral Q6H JEFF    methylnaltrexone (RELISTOR) subcutaneous injection 12 mg  12 mg Subcutaneous Q48H PRN    mirtazapine (REMERON) tablet 30 mg  30 mg Oral HS    morphine (PF) 4 mg/mL injection 4 mg  4 mg Intravenous Q3H PRN    morphine (PF) 4 mg/mL injection 4 mg  4 mg Intravenous Once PRN    ondansetron (ZOFRAN) injection 4 mg  4 mg Intravenous Q6H PRN    oxyCODONE (ROXICODONE) immediate release tablet 10 mg  10 mg Oral Q4H PRN    oxyCODONE (ROXICODONE) IR tablet 15 mg  15 mg Oral Q4H PRN    saccharomyces boulardii (FLORASTOR) capsule 250 mg  250 mg Oral BID    senna (SENOKOT) tablet 8 6 mg  1 tablet Oral BID    and PTA meds:   None       Allergies   Allergen Reactions    Cat Hair Extract     Dog Epithelium     Latex     Pollen Extract        Objective     Temp:  [97 4 °F (36 3 °C)] 97 4 °F (36 3 °C)  HR:  [101] 101  Resp:  [18] 18  BP: (113-120)/(55-70) 113/55    Physical Exam  Vitals signs and nursing note reviewed  Constitutional:       General: She is awake  She is not in acute distress  Comments: Patient is sleeping on my arrival, had to be woken up  Eyes:      Pupils: Pupils are equal, round, and reactive to light  Cardiovascular:      Rate and Rhythm: Normal rate and regular rhythm  Skin:     General: Skin is warm and dry  Neurological:      General: No focal deficit present  Mental Status: She is alert and oriented to person, place, and time  GCS: GCS eye subscore is 4  GCS verbal subscore is 5  GCS motor subscore is 6  Psychiatric:         Attention and Perception: Attention normal          Mood and Affect: Mood is anxious  Speech: Speech normal          Behavior: Behavior is agitated  Lab Results:   Results from last 7 days   Lab Units 08/25/20  0527   WBC Thousand/uL 5 86   HEMOGLOBIN g/dL 9 1*   HEMATOCRIT % 28 8*   PLATELETS Thousands/uL 446*      Results from last 7 days   Lab Units 08/25/20  0527 08/24/20  0453   POTASSIUM mmol/L 4 1 3 8   CHLORIDE mmol/L 102 103   CO2 mmol/L 30 30   BUN mg/dL 8 7   CREATININE mg/dL 0 48* 0 49*   CALCIUM mg/dL 8 7 8 6   ALK PHOS U/L  --  160*   ALT U/L  --  26   AST U/L  --  30       Imaging Studies: I have personally reviewed pertinent reports  EKG, Pathology, and Other Studies: I have personally reviewed pertinent reports  Counseling / Coordination of Care  Total floor / unit time spent today 20 minutes  Greater than 50% of total time was spent with the patient and / or family counseling and / or coordination of care   A description of the counseling / coordination of care: Patient interview, physical examination, review of medical record, review of imaging and laboratory data, development of pain management plan, discussion of pain management plan with patient and primary service      Samantha Shahid PA-C

## 2020-08-26 ENCOUNTER — APPOINTMENT (INPATIENT)
Dept: RADIOLOGY | Facility: HOSPITAL | Age: 30
DRG: 163 | End: 2020-08-26
Payer: COMMERCIAL

## 2020-08-26 PROCEDURE — 99232 SBSQ HOSP IP/OBS MODERATE 35: CPT | Performed by: INTERNAL MEDICINE

## 2020-08-26 PROCEDURE — 72131 CT LUMBAR SPINE W/O DYE: CPT

## 2020-08-26 PROCEDURE — NC001 PR NO CHARGE: Performed by: RADIOLOGY

## 2020-08-26 PROCEDURE — 77001 FLUOROGUIDE FOR VEIN DEVICE: CPT

## 2020-08-26 PROCEDURE — 87040 BLOOD CULTURE FOR BACTERIA: CPT | Performed by: INTERNAL MEDICINE

## 2020-08-26 PROCEDURE — 02HV33Z INSERTION OF INFUSION DEVICE INTO SUPERIOR VENA CAVA, PERCUTANEOUS APPROACH: ICD-10-PCS | Performed by: RADIOLOGY

## 2020-08-26 PROCEDURE — 36584 COMPL RPLCMT PICC RS&I: CPT

## 2020-08-26 PROCEDURE — C1751 CATH, INF, PER/CENT/MIDLINE: HCPCS

## 2020-08-26 PROCEDURE — 74177 CT ABD & PELVIS W/CONTRAST: CPT

## 2020-08-26 PROCEDURE — 99232 SBSQ HOSP IP/OBS MODERATE 35: CPT | Performed by: PHYSICIAN ASSISTANT

## 2020-08-26 PROCEDURE — G1004 CDSM NDSC: HCPCS

## 2020-08-26 PROCEDURE — 87070 CULTURE OTHR SPECIMN AEROBIC: CPT | Performed by: NURSE PRACTITIONER

## 2020-08-26 RX ORDER — GABAPENTIN 100 MG/1
200 CAPSULE ORAL 3 TIMES DAILY
Status: COMPLETED | OUTPATIENT
Start: 2020-08-26 | End: 2020-08-30

## 2020-08-26 RX ORDER — GABAPENTIN 400 MG/1
400 CAPSULE ORAL 3 TIMES DAILY
Status: DISCONTINUED | OUTPATIENT
Start: 2020-08-26 | End: 2020-08-26

## 2020-08-26 RX ORDER — MORPHINE SULFATE 4 MG/ML
3 INJECTION, SOLUTION INTRAMUSCULAR; INTRAVENOUS
Status: DISPENSED | OUTPATIENT
Start: 2020-08-26 | End: 2020-08-31

## 2020-08-26 RX ADMIN — LORAZEPAM 2 MG: 2 INJECTION INTRAMUSCULAR; INTRAVENOUS at 23:50

## 2020-08-26 RX ADMIN — MORPHINE SULFATE 4 MG: 4 INJECTION INTRAVENOUS at 10:39

## 2020-08-26 RX ADMIN — LORAZEPAM 2 MG: 2 INJECTION INTRAMUSCULAR; INTRAVENOUS at 05:06

## 2020-08-26 RX ADMIN — METHOCARBAMOL TABLETS 750 MG: 750 TABLET, COATED ORAL at 23:47

## 2020-08-26 RX ADMIN — LORAZEPAM 2 MG: 2 INJECTION INTRAMUSCULAR; INTRAVENOUS at 01:24

## 2020-08-26 RX ADMIN — OXYCODONE HYDROCHLORIDE 15 MG: 10 TABLET ORAL at 11:26

## 2020-08-26 RX ADMIN — Medication 250 MG: at 17:36

## 2020-08-26 RX ADMIN — METHOCARBAMOL TABLETS 750 MG: 750 TABLET, COATED ORAL at 00:00

## 2020-08-26 RX ADMIN — MORPHINE SULFATE 4 MG: 4 INJECTION INTRAVENOUS at 00:00

## 2020-08-26 RX ADMIN — MORPHINE SULFATE 3 MG: 4 INJECTION INTRAVENOUS at 20:18

## 2020-08-26 RX ADMIN — OXYCODONE HYDROCHLORIDE 15 MG: 10 TABLET ORAL at 05:06

## 2020-08-26 RX ADMIN — LORAZEPAM 2 MG: 2 INJECTION INTRAMUSCULAR; INTRAVENOUS at 20:18

## 2020-08-26 RX ADMIN — LORAZEPAM 2 MG: 2 INJECTION INTRAMUSCULAR; INTRAVENOUS at 14:52

## 2020-08-26 RX ADMIN — GABAPENTIN 200 MG: 100 CAPSULE ORAL at 21:04

## 2020-08-26 RX ADMIN — DULOXETINE HYDROCHLORIDE 30 MG: 30 CAPSULE, DELAYED RELEASE ORAL at 08:29

## 2020-08-26 RX ADMIN — METHOCARBAMOL TABLETS 750 MG: 750 TABLET, COATED ORAL at 17:36

## 2020-08-26 RX ADMIN — GABAPENTIN 200 MG: 100 CAPSULE ORAL at 17:36

## 2020-08-26 RX ADMIN — ENOXAPARIN SODIUM 60 MG: 60 INJECTION SUBCUTANEOUS at 23:51

## 2020-08-26 RX ADMIN — CEFAZOLIN SODIUM 2000 MG: 2 SOLUTION INTRAVENOUS at 17:37

## 2020-08-26 RX ADMIN — ONDANSETRON 4 MG: 2 INJECTION INTRAMUSCULAR; INTRAVENOUS at 10:38

## 2020-08-26 RX ADMIN — MORPHINE SULFATE 3 MG: 4 INJECTION INTRAVENOUS at 14:53

## 2020-08-26 RX ADMIN — MIRTAZAPINE 30 MG: 30 TABLET, FILM COATED ORAL at 21:04

## 2020-08-26 RX ADMIN — CEFAZOLIN SODIUM 2000 MG: 2 SOLUTION INTRAVENOUS at 07:50

## 2020-08-26 RX ADMIN — OXYCODONE HYDROCHLORIDE 15 MG: 10 TABLET ORAL at 17:36

## 2020-08-26 RX ADMIN — MORPHINE SULFATE 3 MG: 4 INJECTION INTRAVENOUS at 23:49

## 2020-08-26 RX ADMIN — IOHEXOL 100 ML: 350 INJECTION, SOLUTION INTRAVENOUS at 11:05

## 2020-08-26 RX ADMIN — Medication 250 MG: at 08:30

## 2020-08-26 RX ADMIN — METHOCARBAMOL TABLETS 750 MG: 750 TABLET, COATED ORAL at 11:26

## 2020-08-26 RX ADMIN — CEFAZOLIN SODIUM 2000 MG: 2 SOLUTION INTRAVENOUS at 00:00

## 2020-08-26 RX ADMIN — METHOCARBAMOL TABLETS 750 MG: 750 TABLET, COATED ORAL at 05:06

## 2020-08-26 RX ADMIN — MELATONIN 6 MG: at 21:04

## 2020-08-26 RX ADMIN — CEFAZOLIN SODIUM 2000 MG: 2 SOLUTION INTRAVENOUS at 23:50

## 2020-08-26 RX ADMIN — MORPHINE SULFATE 4 MG: 4 INJECTION INTRAVENOUS at 07:50

## 2020-08-26 RX ADMIN — LORAZEPAM 2 MG: 2 INJECTION INTRAMUSCULAR; INTRAVENOUS at 11:26

## 2020-08-26 RX ADMIN — MORPHINE SULFATE 4 MG: 4 INJECTION INTRAVENOUS at 03:25

## 2020-08-26 RX ADMIN — ENOXAPARIN SODIUM 60 MG: 60 INJECTION SUBCUTANEOUS at 11:26

## 2020-08-26 RX ADMIN — OXYCODONE HYDROCHLORIDE 15 MG: 10 TABLET ORAL at 01:24

## 2020-08-26 RX ADMIN — OXYCODONE HYDROCHLORIDE 15 MG: 10 TABLET ORAL at 22:02

## 2020-08-26 RX ADMIN — GABAPENTIN 300 MG: 300 CAPSULE ORAL at 08:30

## 2020-08-26 NOTE — ASSESSMENT & PLAN NOTE
· Recurrent MSSA bacteremia  · Initially admitted to [facility] from 07/15-blood culture were positive for MSSA bacteremia, but patient signed AMA on 07/22  Her repeat blood culture done on 7/21 was negative after 5 days  · [facility] on 07/30-blood culture came back positive for MSSA and she left again is medical advice on 08/04  Her blood cultures came back positive on 07/30, but the bacteremia cleared as of 8 /2  Patient left AMA on 08/04  · Readmitted to [address] [place] [address] on 08/10-blood culture came back positive for Staph aureus  · 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 during the last hospital stay from 7/15-7/22    · Plan is to continue IV cefazolin for a 6 week course per ID recommendations

## 2020-08-26 NOTE — UTILIZATION REVIEW
Continued Stay Review    Date: 08/26/2020                          Current Patient Class: Inpatient  Current Level of Care: Med/Surg    HPI:29 y o  female initially admitted on 08/12/2020  Bacteremia due to Staphylococcus Aureus  Tricuspid valve endocarditis, 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    Active IV heroin use  Assessment/Plan: Continue IV Abx  Increase lovenox to 60 mg q12h due to acute left DVT of axillary vein  Continue pain management with scheduled and PRN medications  Concerned about not having good access involving the right upper extremity, will have PICC line changed over a wire today, check catheter tip, and if catheter tip is positive will need to find another site for the PICC line    VTE Pharmacologic Prophylaxis:   Pharmacologic: Enoxaparin (Lovenox)  Mechanical VTE Prophylaxis in Place: No  Pertinent Labs/Diagnostic Results:     Results from last 7 days   Lab Units 08/25/20 0527 08/24/20 0453 08/21/20  0459   WBC Thousand/uL 5 86 5 53 8 70   HEMOGLOBIN g/dL 9 1* 10 2* 7 9*   HEMATOCRIT % 28 8* 32 5* 25 5*   PLATELETS Thousands/uL 446* 425* 478*   NEUTROS ABS Thousands/µL 2 66 2 65 5 04     Results from last 7 days   Lab Units 08/25/20  0527 08/24/20  0453 08/21/20  0459   SODIUM mmol/L 138 139 137   POTASSIUM mmol/L 4 1 3 8 4 1   CHLORIDE mmol/L 102 103 101   CO2 mmol/L 30 30 29   ANION GAP mmol/L 6 6 7   BUN mg/dL 8 7 12   CREATININE mg/dL 0 48* 0 49* 0 50*   EGFR ml/min/1 73sq m 135 134 133   CALCIUM mg/dL 8 7 8 6 8 9     Results from last 7 days   Lab Units 08/24/20 0453 08/21/20  0459   AST U/L 30 78*   ALT U/L 26 52   ALK PHOS U/L 160* 161*   TOTAL PROTEIN g/dL 8 2 8 3*   ALBUMIN g/dL 2 4* 2 2*   TOTAL BILIRUBIN mg/dL 0 17* 0 20     Results from last 7 days   Lab Units 08/25/20  0527 08/24/20 0453 08/21/20  0459   GLUCOSE RANDOM mg/dL 97 128 105     Vital Signs: /67 (BP Location: Left arm)   Pulse 105   Temp 98 1 °F (36 7 °C) (Oral)   Resp 18   Ht 5' 5" (1 651 m)   Wt 63 5 kg (139 lb 15 9 oz)   LMP  (LMP Unknown) Comment: unknown, per pt  SpO2 96%   Breastfeeding No   BMI 23 30 kg/m²     Medications:   Scheduled Medications:  cefazolin, 2,000 mg, Intravenous, Q8H  DULoxetine, 30 mg, Oral, Daily  enoxaparin, 1 mg/kg, Subcutaneous, Q12H JEFF  gabapentin, 400 mg, Oral, TID  iohexol, 50 mL, Oral, 90 min pre-procedure  lidocaine, 2 patch, Topical, Daily  methocarbamol, 750 mg, Oral, Q6H JEFF  mirtazapine, 30 mg, Oral, HS  saccharomyces boulardii, 250 mg, Oral, BID  senna, 1 tablet, Oral, BID    Continuous IV Infusions:     PRN Meds:  calcium carbonate, 1,000 mg, Oral, Daily PRN  LORazepam, 2 mg, Intravenous, Q3H PRN  magnesium citrate, 296 mL, Oral, Daily PRN  melatonin, 6 mg, Oral, HS PRN  methylnaltrexone bromide, 12 mg, Subcutaneous, Q48H PRN  morphine injection, 3 mg, Intravenous, Q3H PRN  ondansetron, 4 mg, Intravenous, Q6H PRN  oxyCODONE, 10 mg, Oral, Q4H PRN  oxyCODONE, 15 mg, Oral, Q4H PRN      Discharge Plan: [facility]    Network Utilization Review Department  [email or web address]  ATTENTION: Please call with any questions or concerns to [phone] and carefully listen to the prompts so that you are directed to the right person  All voicemails are confidential   Jennifer Sousa all requests for admission clinical reviews, approved or denied determinations and any other requests to dedicated fax number below belonging to the campus where the patient is receiving treatment   List of dedicated fax numbers for the Facilities:  [address] DENIALS (Administrative/Medical Necessity) [phone]   [address]  (Maternity/NICU/Pediatrics) [phone]   Porter Rivas [phone]   Kerrie Healy [phone]   Kaitlin Mancilla [phone]   [address] [facility] [phone] [address] [facility], [facility]  [phone]   [address] [place] [address] [facility] [phone]

## 2020-08-26 NOTE — PROGRESS NOTES
Attempted to visit pt was not in the room  Patient moved to CT  Talked with her nurse pt's condition       08/26/20 1100   Clinical Encounter Type   Visited With Health care provider

## 2020-08-26 NOTE — PROCEDURES
PICC Line Insertion    Date/Time: 8/26/2020 3:58 PM  Performed by: Ammy Senior  Authorized by: Hoover Duane, DO     Patient location:  IR  Consent:     Consent obtained:  Written    Consent given by:  Patient    Risks discussed:  Arterial puncture, incorrect placement, nerve damage, infection and bleeding    Alternatives discussed:  No treatment, delayed treatment and alternative treatment  Universal protocol:     Procedure explained and questions answered to patient or proxy's satisfaction: yes      Relevant documents present and verified: yes      Test results available and properly labeled: yes      Radiology Images displayed and confirmed  If images not available, report reviewed: yes      Required blood products, implants, devices, and special equipment available: yes      Site/side marked: yes      Immediately prior to procedure, a time out was called: yes      Patient identity confirmed:  Verbally with patient and arm band  Pre-procedure details:     Hand hygiene: Hand hygiene performed prior to insertion      Sterile barrier technique: All elements of maximal sterile technique followed      Skin preparation:  ChloraPrep    Skin preparation agent: Skin preparation agent completely dried prior to procedure    Anesthesia (see MAR for exact dosages):      Anesthesia method:  Local infiltration    Local anesthetic:  Lidocaine 1% w/o epi  Procedure details:     Location:  Basilic    Vessel type: vein      Laterality:  Left    Approach comment:  Exchanged over guidewire    Patient position:  Flat    Procedural supplies:  Double lumen    Catheter size:  5 Fr    Landmarks identified: yes      Ultrasound guidance: no      Number of attempts:  1    Successful placement: yes      Total catheter length (cm):  43    Catheter out on skin (cm):  0    Max flow rate:  999ml/hr    Arm circumference:  28  Post-procedure details:     Post-procedure:  Securement device placed and dressing applied    Assessment:  Blood return through all ports, free fluid flow and placement verified by x-ray    Post-procedure complications: none      Patient tolerance of procedure:   Tolerated well, no immediate complications

## 2020-08-26 NOTE — PROGRESS NOTES
Progress Note - Infectious Disease   Betty Awan 34 y o  female MRN: [id number]  Unit/Bed#: [facility] 425-01 Encounter: [id number]      Impression/Plan:  1  Recurrent/persistent MSSA bacteremia with TV infective endocarditis   Blood cultures from 8/10 remain positive   Prolonged course of IV antibiotic was previously recommended, but patient has left AMA on 2 prior occasions (once at [facility] and once from [facility])    She remains hemodynamically stable despite her ongoing bacteremia  Repeat blood cultures negative     -continue cefazolin for now at current dose  -recheck blood cultures x 1 set today off the fresh PICC line  -follow up repeat blood cultures  -recheck CBC with diff and CMP  -as concerned about not having good access involving the right upper extremity, will have PICC line changed over a wire today, check catheter tip, and if catheter tip is positive will need to find another site for the PICC line      2  Tricuspid valve endocarditis, 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  7/21 MELISSA showed large vegetation on TV with severe regurgitation  7/15 CT abdomen/pelvis also showed bilateral renal parenchymal abnormalities concerning for septic emboli   No intra-abdominal abscess     -antibiotic plan as above  -CT surgery is following      3  Right iliacus muscle abscesses-very small and unlikely to be able to be drained   At the level of the mid SI joint but the SI joint appears okay   Now with increasing pain of unclear significance   Perhaps the infection is now spread into the SI joint although this is not overtly seen on CT scan   Her increased pain may be more related to the decreased morphine dose   However she continues to have severe pain  Patient now agreeable to CT scan   -antibiotics as above  -pain management  -check CT the abdomen pelvis to look for evidence of iliopsoas abscess, or other retroperitoneal source of pain     4  Recent left foot cellulitis with abscess   Likely site of injection of IV drugs versus ectopic foci in the setting of MSSA bacteremia   This appears to have healed with no evidence of active infection   -antibiotic plan as above      5  Back pain   More likely secondary to chronic pain, opioid dependence   20 Thoracic and lumbar spine MRIs performed at [facility] negative were for abscess or osteomyelitis     -monitor back pain   -check CT lumbar spine  -serial exams  -acute pain service follow-up     6  Active IV heroin abuse   This is the causative factor for development of bacteremia and infective endocarditis   Patient has left AMA on prior occasions   HIV screen negative  -patient is not a candidate for at home PICC line/IV antibiotics  -acute pain service follow-up     7  Chronic HCV infection, transaminitis   Recent HIV was negative   The LFTs are waxing waning  -monitor LFTs      8  Left upper extremity DVT-in the setting of PICC line use   Patient now on anticoagulation  -vascular follow-up  -serial exam  -anticoagulation    Discussed the above management plan with interventional Radiology and the primary service    Antibiotics:  Cefazolin restart 17    Subjective:  Patient has no fever, chills, sweats; no nausea, vomiting, diarrhea; no cough, shortness of breath; no increased pain  No new symptoms  She seems less agitated today  Objective:  Vitals:  Temp:  [98 1 °F (36 7 °C)-98 5 °F (36 9 °C)] 98 1 °F (36 7 °C)  HR:  [] 105  Resp:  [16-18] 18  BP: (102-113)/(56-69) 113/67  SpO2:  [93 %-98 %] 96 %  Temp (24hrs), Av 2 °F (36 8 °C), Min:98 1 °F (36 7 °C), Max:98 5 °F (36 9 °C)  Current: Temperature: 98 1 °F (36 7 °C)    Physical Exam:   General Appearance:  Alert, interactive, nontoxic, no acute distress  Throat: Oropharynx moist without lesions      Lungs:   Clear to auscultation bilaterally; no wheezes, rhonchi or rales; respirations unlabored   Heart:  Tachycardic; no murmur, rub or gallop   Abdomen:   Soft, non-tender, non-distended, positive bowel sounds  Extremities: No clubbing, cyanosis or edema   Skin: No new rashes or lesions  No draining wounds noted         Labs, Imaging, & Other studies:   All pertinent labs and imaging studies were personally reviewed  Results from last 7 days   Lab Units 08/25/20  0527 08/24/20  0453 08/21/20  0459   WBC Thousand/uL 5 86 5 53 8 70   HEMOGLOBIN g/dL 9 1* 10 2* 7 9*   PLATELETS Thousands/uL 446* 425* 478*     Results from last 7 days   Lab Units 08/25/20  0527 08/24/20  0453 08/21/20  0459   SODIUM mmol/L 138 139 137   POTASSIUM mmol/L 4 1 3 8 4 1   CHLORIDE mmol/L 102 103 101   CO2 mmol/L 30 30 29   BUN mg/dL 8 7 12   CREATININE mg/dL 0 48* 0 49* 0 50*   EGFR ml/min/1 73sq m 135 134 133   CALCIUM mg/dL 8 7 8 6 8 9   AST U/L  --  30 78*   ALT U/L  --  26 52   ALK PHOS U/L  --  160* 161*

## 2020-08-26 NOTE — PROGRESS NOTES
Progress Note - Betty Awan 34 y o  female MRN: [id number]    Unit/Bed#: [facility] 425-01 Encounter: [id number]      Assessment:  Bacteremia due to Staphylococcus aureus  Acute bacterial endocarditis  Septic embolism  Back pain  IV Drug use   Bipolar 1 disorder  DVT of axillary vein, acute left   Chronic HCV infection    Plan:  Bacteremia due to Staphylococcus aureus: Recurrent MSSA bacteremia  Initially admitted to [facility] on 07/15/20  She was found to have positive blood cultures  They turned out to be negative on 07/21, and she left AMA on 07/22  She then presented to [facility] on on 07/30/20, and was found to have positive blood cultures again  The bacteremia cleared on 08/02 and she left AMA on 08/04  She was readmitted to [address] [place] [address] on 08/10 with positive blood cultures for Staph Aureus  She was transferred here to be evaluated by cardiothoracic surgery for tricuspid valve regurgitation  During hospital stay of 07/15- 07/22, she was found to have tricuspid valve endocarditis and septic emboli  Recheck blood cultures x1 set today off fresh PICC line  Continue IV cefazolin per ID recommendations       Acute bacterial endocarditis: 2D echocardiogram on 07/21 showed vegetations on the right atrial aspect  Patient also had transthoracic and transesophageal echocardiogram performed  Concern of tricuspid valve regurgitation  Septic emboli to lungs and posterior iliacus muscle  She was evaluated by cardiothoracic surgery at [facility] for her tricuspid regurgitation  They recommended not performing surgery, unless the patient can demonstrate ability to abstain from drug use  Continue IV cefazolin for now as recommended by ID  Prolonged course initially recommended, but due to history of patient leaving AMA, continue cefazolin for now       Septic embolism: CT of chest, abdomen and pelvis concerning for septic emboli   Pleural effusion on CT of chest and on repeat X-ray noted as well  Patient reports no shortness of breath, however  IV cefazolin for now  Septic emboli to posterior iliacus muscle found as well  This is small and unlikely to be drained  Patient was supposed to get CT abdomen and pelvis with contrast on  08/25/20 in order to look for evidence of iliopsoas abscess, or other retroperitoneal source of pain  However, due to a lot of pain, the CT was not completed  CT abdomen pelvis with contrast on today showed no intra abdominal or pelvic source of infection          Back pain: Patient complaining of lower back pain, as well as in the sacral region  MRI of lumbar and thoracic spine at recent hospital stay in [facility] (08/01/2020) was unremarkable  It was negative for abscesses or osteomyelitis  APS following and managing pain  Back pain likely secondary to chronic pain and opoid dependence  Iliacus muscle abscess visualized on CT scan  Repeat CT showed improvement in septic emboli to iliacus muscle  Plan for IV cefazolin for now  Patient had a CT spine lumbar without contrast performed today which appeared normal  There was no evidence of discitis osteomyelitis       IV Drug use: Drug abuse likely source of bacteremia  Patient was positive for THC, cocaine, and opiates during hospital stay in July of this year  Recent HIV was negative  Patient not candidate for home PICC line/IV antibiotics  Possible drug rehab?     Bipolar 1 disorder: Patient refused psych and neuropsych evaluation       DVT of axillary vein, acute left: Patient developed severe pain in LUE  Duplex ultrasound showed acute non occlusive DVT in axillary and subclavian veins and acute superficial thrombophelebitis in basilic vein  Lovenox increased to 60 mg Q12 hours  Vascular surgery and IR following  IR determined if PICC in LUE is completely removed and the site is changed, the patient's vein may thrombose with DVT  There is now a plan for over wire exchange   The tip will be cultured and if it comes back positive, patient will have a new PICC site  Chronic HCV infection: Monitor LFT's  LFT's waxing and waning  Recent HIV negative       Subjective:   Patient evaluated at bedside today  She denies any chest pain or shortness of breath  She also denies any nausea or vomiting  She denies any fever or chills  Patient explains that she is feeling much worse today  She says that she is in excruciating pain everywhere  Objective:   Vitals: Blood pressure 113/67, pulse 105, temperature 98 1 °F (36 7 °C), temperature source Oral, resp  rate 18, height 5' 5" (1 651 m), weight 63 5 kg (139 lb 15 9 oz), SpO2 96 %, not currently breastfeeding  ,Body mass index is 23 3 kg/m²  Intake/Output Summary (Last 24 hours) at 8/26/2020 0907  Last data filed at 8/26/2020 0506  Gross per 24 hour   Intake 520 ml   Output 200 ml   Net 320 ml       Physical Exam:   Physical Exam  Constitutional:       Appearance: Normal appearance  HENT:      Head: Normocephalic  Eyes:      Extraocular Movements: Extraocular movements intact  Pupils: Pupils are equal, round, and reactive to light  Cardiovascular:      Rate and Rhythm: Normal rate and regular rhythm  Heart sounds: Normal heart sounds  Pulmonary:      Effort: Pulmonary effort is normal       Breath sounds: Normal breath sounds  Abdominal:      General: Bowel sounds are normal       Palpations: Abdomen is soft  Skin:     General: Skin is warm and dry  Neurological:      General: No focal deficit present  Mental Status: She is alert  Psychiatric:      Comments: Anxious          Invasive Devices     Peripherally Inserted Central Catheter Line            PICC Line 49/96/21 Left Basilic 10 days                Lab, Imaging and other studies: I have personally reviewed pertinent reports      VTE Pharmacologic Prophylaxis: Enoxaparin (Lovenox)  VTE Mechanical Prophylaxis: sequential compression device

## 2020-08-26 NOTE — QUICK NOTE
After further discussion with IR attendings, and conferring with Dr Ravi West, concern for if completely PICC removal and changing of the site, patients vein may thrombose completely with DVT  PICC team attempt x 2 on right side and concern for poor venous compliance given hx of IV drug use       - plan for over the wire exchange  - culture the tip  - if tip comes back positive will give patient new PICC site

## 2020-08-26 NOTE — ASSESSMENT & PLAN NOTE
· Patient with history of drug abuse and likely the source of bacteremia  · Patient has a history of signing against medical advise  · May benefit from drug rehab eventually if she becomes agreeable to host services  She was not interested during my conversation yesterday  · During the hospital stay in July of this year patient was positive for [facility] ,cocaine and opiates  Although she reports to me my conversation yesterday that she has not used any drugs for several years now

## 2020-08-26 NOTE — ASSESSMENT & PLAN NOTE
Increase lovenox to 60mg Q12h  Due to severe pain will consult with vascular surgery for treatment options  May need picc removed to a different site  Picc continues to function well and post recent blood culture is negative  Elevation of LUE  PICC team attempted to move picc line to right arm given left arm DVT pain  Unsuccessful   IR consulted   IR to exchange PICC

## 2020-08-26 NOTE — ASSESSMENT & PLAN NOTE
· Patient is complaining of severe back pain mainly in the lower part of the back and also in the sacral region  · Patient had MRI of the lumbar and thoracic spine during the recent hospital stay in [facility] which was unremarkable  · - continue robaxin to 750 mg q 6  · - continue oxy to 10 mg and 15 mg   · - continue iv morphine 4 mg to q3 hrs prn for breakthrough  · -continue tylenol atc pt can refuse (she states it makes her break out in profuse sweats)   · - continue lidocaine patch  · -continue gabapentin to 300mg TID  · Iliacus muscle abscess is noted on CT scan, continue antibiotic treatment as above, no indication for drainage at this time  · Repeat CT scan shows improvement in septic emboli to iliacus muscle  · ketamine infusion discontinued  · Patient has been reluctant with interventions and procedures  She cites that she wants IV pain meds before intervention  Extensive discussion regarding acute pain service recommendations  Will proceed with following recommendations by acute pain service    · Possible MRI with sedation on Wednesday due to severe claustrophobia if no improvement with antibiotic treatment

## 2020-08-26 NOTE — PROGRESS NOTES
Progress Note - Verna Quintanilla 1990, 34 y o  female MRN: [id number]    Unit/Bed#: [facility] 425-01 Encounter: [id number]    Primary Care Provider: Alicia Charles PA-C   Date and time admitted to hospital: 8/12/2020  7:39 PM        * Bacteremia due to Staphylococcus aureus  Assessment & Plan  · Recurrent MSSA bacteremia  · Initially admitted to [facility] from 07/15-blood culture were positive for MSSA bacteremia, but patient signed AMA on 07/22  Her repeat blood culture done on 7/21 was negative after 5 days  · [facility] on 07/30-blood culture came back positive for MSSA and she left again is medical advice on 08/04  Her blood cultures came back positive on 07/30, but the bacteremia cleared as of 8 /2  Patient left AMA on 08/04  · Readmitted to [address] [place] [address] on 08/10-blood culture came back positive for Staph aureus  · 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)  Assessment & Plan  Increase lovenox to 60mg Q12h  Due to severe pain will consult with vascular surgery for treatment options  May need picc removed to a different site  Picc continues to function well and post recent blood culture is negative  Elevation of LUE  PICC team attempted to move picc line to right arm given left arm DVT pain  Unsuccessful  IR consulted   IR to exchange PICC    Intravenous drug abuse [facility])  Assessment & Plan  · Patient with history of drug abuse and likely the source of bacteremia  · Patient has a history of signing against medical advise  · May benefit from drug rehab eventually if she becomes agreeable to host services  She was not interested during my conversation yesterday  · During the hospital stay in July of this year patient was positive for [facility] ,cocaine and opiates  Although she reports to me my conversation yesterday that she has not used any drugs for several years now  Back pain  Assessment & Plan  · Patient is complaining of severe back pain mainly in the lower part of the back and also in the sacral region  · Patient had MRI of the lumbar and thoracic spine during the recent hospital stay in [facility] which was unremarkable  · - continue robaxin to 750 mg q 6  · - continue oxy to 10 mg and 15 mg   · - continue iv morphine 4 mg to q3 hrs prn for breakthrough  · -continue tylenol atc pt can refuse (she states it makes her break out in profuse sweats)   · - continue lidocaine patch  · -continue gabapentin to 300mg TID  · Iliacus muscle abscess is noted on CT scan, continue antibiotic treatment as above, no indication for drainage at this time  · Repeat CT scan shows improvement in septic emboli to iliacus muscle  · ketamine infusion discontinued  · Patient has been reluctant with interventions and procedures  She cites that she wants IV pain meds before intervention  Extensive discussion regarding acute pain service recommendations  Will proceed with following recommendations by acute pain service  · Possible MRI with sedation on Wednesday due to severe claustrophobia if no improvement with antibiotic treatment    Bipolar 1 disorder [facility])  Assessment & Plan  · Patient refused Psychiatry and neuropsych evaluation    Septic embolism [facility])  Assessment & Plan  · Patient noted to have abnormalities seen in the CT of the chest abdomen and pelvis concerning for septic emboli  · There is pleural effusion on the CT scan of the chest and also on repeat chest x-ray  · Patient is rather asymptomatic from pulmonary standpoint  · Continue with the antibiotics    · Thoracic surgery have evaluated the pt and she is not short of breath   · 8/15-imaging right hip shows no osseous involvement; concern for proximity of abscess status SI joint, but no SI joint involvement at this time  · Management as above under back pain        VTE Pharmacologic Prophylaxis:   Pharmacologic: Enoxaparin (Lovenox)  Mechanical VTE Prophylaxis in Place: No    Patient Centered Rounds: I have performed bedside rounds with nursing staff today  Time Spent for Care: 15 minutes  More than 50% of total time spent on counseling and coordination of care as described above  Current Length of Stay: 14 day(s)    Current Patient Status: Inpatient   Certification Statement: The patient will continue to require additional inpatient hospital stay due to Need to monitor symptoms        Code Status: Level 1 - Full Code      Subjective:   Extensive discussion regarding pain meds with acute pain service  Will make adjustments to her regimen to wean off the morphine  Follow up on CT imaging  Suspect that this should be improving  Will wait final report though  Objective:     Vitals:   Temp (24hrs), Av 2 °F (36 8 °C), Min:98 1 °F (36 7 °C), Max:98 5 °F (36 9 °C)    Temp:  [98 1 °F (36 7 °C)-98 5 °F (36 9 °C)] 98 1 °F (36 7 °C)  HR:  [] 105  Resp:  [16-18] 18  BP: (102-113)/(56-69) 113/67  SpO2:  [93 %-98 %] 96 %  Body mass index is 23 3 kg/m²  Input and Output Summary (last 24 hours): Intake/Output Summary (Last 24 hours) at 2020 0843  Last data filed at 2020 0506  Gross per 24 hour   Intake 520 ml   Output 900 ml   Net -380 ml       Physical Exam:     Physical Exam  Constitutional:       Appearance: Normal appearance  HENT:      Head: Normocephalic and atraumatic  Neck:      Musculoskeletal: No neck rigidity  Cardiovascular:      Rate and Rhythm: Normal rate and regular rhythm  Heart sounds: No murmur  Pulmonary:      Effort: Pulmonary effort is normal  No respiratory distress  Breath sounds: Normal breath sounds  No stridor  Abdominal:      General: Abdomen is flat  There is no distension  Palpations: Abdomen is soft  There is no mass     Skin: General: Skin is warm and dry  Coloration: Skin is not jaundiced  Neurological:      General: No focal deficit present  Mental Status: She is alert  Additional Data:     Labs:    Results from last 7 days   Lab Units 08/25/20  0527   WBC Thousand/uL 5 86   HEMOGLOBIN g/dL 9 1*   HEMATOCRIT % 28 8*   PLATELETS Thousands/uL 446*   NEUTROS PCT % 44   LYMPHS PCT % 40   MONOS PCT % 8   EOS PCT % 6     Results from last 7 days   Lab Units 08/25/20  0527 08/24/20  0453   POTASSIUM mmol/L 4 1 3 8   CHLORIDE mmol/L 102 103   CO2 mmol/L 30 30   BUN mg/dL 8 7   CREATININE mg/dL 0 48* 0 49*   CALCIUM mg/dL 8 7 8 6   ALK PHOS U/L  --  160*   ALT U/L  --  26   AST U/L  --  30           * I Have Reviewed All Lab Data Listed Above  * Additional Pertinent Lab Tests Reviewed:  All Labs Within Last 24 Hours Reviewed        Recent Cultures (last 7 days):           Last 24 Hours Medication List:   Current Facility-Administered Medications   Medication Dose Route Frequency Provider Last Rate    calcium carbonate  1,000 mg Oral Daily PRN Bia Barcenas MD      cefazolin  2,000 mg Intravenous Q8H Asha Roca PA-C 2,000 mg (08/26/20 0750)    DULoxetine  30 mg Oral Daily Mychal Reyna DO      enoxaparin  1 mg/kg Subcutaneous Q12H [address] Jonelle Schwarz MD      gabapentin  300 mg Oral TID Jonelle Schwarz MD      haloperidol lactate  2 mg Intramuscular Q30 Min PRN Justine Gilmore PA-C      iohexol  50 mL Oral 90 min pre-procedure Mychal Reyna DO      lidocaine  2 patch Topical Daily Asha Roca PA-C      LORazepam  2 mg Intravenous Q3H PRN Jonelle Schwarz MD      magnesium citrate  296 mL Oral Daily PRN Leonor Anaya MD      melatonin  6 mg Oral HS PRN Asha Roca PA-C      methocarbamol  750 mg Oral Q6H [address] Jonelle Schwarz MD      methylnaltrexone bromide  12 mg Subcutaneous Q48H PRN Jonelle Schwarz MD      mirtazapine  30 mg Oral HS Bia Barcenas MD      morphine injection  4 mg Intravenous Q3H PRN Jonelle Schwarz MD      morphine injection  4 mg Intravenous Once PRN Jonelle Schwarz MD      ondansetron  4 mg Intravenous Q6H PRN Bia Barcenas MD      oxyCODONE  10 mg Oral Q4H PRN Rosendo Branch, BOO      oxyCODONE  15 mg Oral Q4H PRN Rosendo Branch, BOO      saccharomyces boulardii  250 mg Oral BID Leonor Anaya MD      senna  1 tablet Oral BID Leonor Anaya MD          Today, Patient Was Seen By: Maryln Mohs, DO    ** Please Note: Dictation voice to text software may have been used in the creation of this document   **

## 2020-08-26 NOTE — PROGRESS NOTES
Progress Note - Acute Pain Service    Evonne Camarillo 34 y o  female MRN: [id number]  Unit/Bed#: [facility] 425-01 Encounter: [id number]      Assessment:   Principal Problem:    Bacteremia due to Staphylococcus aureus  Active Problems:    Acute bacterial endocarditis    Septic embolism (HCC)    Bipolar 1 disorder (HCC)    Back pain    Intravenous drug abuse ([facility] [address] )    DVT of axillary vein, acute left (HCC)      Plan:   · Continue oxycodone 10 mg p o  q 4 hours p r n  moderate pain  · Continue oxycodone 15 mg p o  q 4 hours p r n  severe pain  · Decreased morphine to 3 mg IV q 3 hours p r n  breakthrough pain  · Continue duloxetine 30 mg p o  daily  · Continue Ativan 2 mg IV q 3 hours p r n  agitation  · Suggest increase gabapentin to 400 mg p o  t i d  scheduled  · Continue methocarbamol 750 mg p o  q 6 hours scheduled  · Continue lidocaine 5% patch, 2 patches for 12 hours daily  · Continue bowel regimen to avoid opioid induced constipation  ·   · Patient seen contemporaneously with internal medicine, Dr Sam Goel  Long discussion was held with patient regarding her current diagnoses, medical plan, pain management plan and substance use disorder plan  Imaging and laboratory data have not substantiated patient's clean of pain in her lower back and right hip  She has multiple times refused to go for imaging studies  She has a CT scan of the lumbar spine pending today as well as an MRI with anesthesia planned  Patient continues to complain of worsening pain in the right hip and lower back without numbness, tingling, weakness  Patient has been able to stand and get to the bathroom  She dress is herself  She claims, however, she cannot move or sit up in bed without severe, disabling pain  Patient frequently asks for IV Dilaudid and IV Ativan at the same time and is extremely reluctant to begin to decrease opioid use    It was explained to the patient that, due to no evidence of a source for severe pain and that the patient is an otherwise healthy 47-year-old, that her pain is likely related to a decreased pain tolerance as well as opioid disorder and very likely opioid induced hyperalgesia  Explained hyperalgesia to the patient and that the treatment for such is decrease in use of opioids  The patient became extremely tearful, saying she is not ready to decrease her opioids but, at the same time, stating that she very much wants to stop using heroin and plans to not use heroin or any other drugs when she leaves the hospital   Explained to the patient that we are going to begin slowly decreasing her opioids and supplement with nonopioid medications to mitigate her pain but that she will not be pain-free likely during the process  Patient remains extremely reluctant but agrees to begin weaning opioids  Will continue to reassess patient frequently and reassure patient  Will follow up with imaging as it is completed  Patient's mother was present throughout this conversation with the patients consent  APS will continue to follow; please contact APS ( [phone]) with any further questions    Pain History  Current pain location(s):  Right hip, lower back  Pain Scale:   10/10  Quality:  Sharp  24 hour history:  Patient continues to complain of worsening pain without specific findings to explain her pain  No new complaints    Refused CT scan yesterday and states that there is room were that nurses aides said that she is just going to go use again when she leaves the hospital     Opioid requirement previous 24 hours:  Morphine 28 mg IV, oxycodone 75 mg p o     Meds/Allergies   all current active meds have been reviewed, current meds:   Current Facility-Administered Medications   Medication Dose Route Frequency    calcium carbonate (TUMS) chewable tablet 1,000 mg  1,000 mg Oral Daily PRN    ceFAZolin (ANCEF) IVPB (premix) 2,000 mg 50 mL  2,000 mg Intravenous Q8H    DULoxetine (CYMBALTA) delayed release capsule 30 mg  30 mg Oral Daily    enoxaparin (LOVENOX) subcutaneous injection 60 mg  1 mg/kg Subcutaneous Q12H [address]    gabapentin (NEURONTIN) capsule 300 mg  300 mg Oral TID    haloperidol lactate (HALDOL) injection 2 mg  2 mg Intramuscular Q30 Min PRN    iohexol (OMNIPAQUE) 240 MG/ML solution 50 mL  50 mL Oral 90 min pre-procedure    lidocaine (LIDODERM) 5 % patch 2 patch  2 patch Topical Daily    LORazepam (ATIVAN) injection 2 mg  2 mg Intravenous Q3H PRN    magnesium citrate (CITROMA) oral solution 296 mL  296 mL Oral Daily PRN    melatonin tablet 6 mg  6 mg Oral HS PRN    methocarbamol (ROBAXIN) tablet 750 mg  750 mg Oral Q6H [address]    methylnaltrexone (RELISTOR) subcutaneous injection 12 mg  12 mg Subcutaneous Q48H PRN    mirtazapine (REMERON) tablet 30 mg  30 mg Oral HS    morphine (PF) 4 mg/mL injection 4 mg  4 mg Intravenous Q3H PRN    ondansetron (ZOFRAN) injection 4 mg  4 mg Intravenous Q6H PRN    oxyCODONE (ROXICODONE) immediate release tablet 10 mg  10 mg Oral Q4H PRN    oxyCODONE (ROXICODONE) IR tablet 15 mg  15 mg Oral Q4H PRN    saccharomyces boulardii (FLORASTOR) capsule 250 mg  250 mg Oral BID    senna (SENOKOT) tablet 8 6 mg  1 tablet Oral BID    and PTA meds:   None       Allergies   Allergen Reactions    Cat Hair Extract     Dog Epithelium     Latex     Pollen Extract        Objective     Temp:  [98 1 °F (36 7 °C)-98 5 °F (36 9 °C)] 98 1 °F (36 7 °C)  HR:  [] 105  Resp:  [16-18] 18  BP: (102-113)/(56-69) 113/67    Physical Exam  Vitals signs and nursing note reviewed  Constitutional:       General: She is awake  She is not in acute distress  Appearance: She is not toxic-appearing  Cardiovascular:      Rate and Rhythm: Normal rate and regular rhythm  Skin:     General: Skin is warm and dry  Neurological:      General: No focal deficit present  Mental Status: She is alert and oriented to person, place, and time  GCS: GCS eye subscore is 4   GCS verbal subscore is 5  GCS motor subscore is 6  Psychiatric:         Attention and Perception: Attention normal          Mood and Affect: Mood is anxious  Behavior: Behavior is agitated  Lab Results:   Results from last 7 days   Lab Units 08/25/20  0527   WBC Thousand/uL 5 86   HEMOGLOBIN g/dL 9 1*   HEMATOCRIT % 28 8*   PLATELETS Thousands/uL 446*      Results from last 7 days   Lab Units 08/25/20  0527 08/24/20  0453   POTASSIUM mmol/L 4 1 3 8   CHLORIDE mmol/L 102 103   CO2 mmol/L 30 30   BUN mg/dL 8 7   CREATININE mg/dL 0 48* 0 49*   CALCIUM mg/dL 8 7 8 6   ALK PHOS U/L  --  160*   ALT U/L  --  26   AST U/L  --  30       Imaging Studies: I have personally reviewed pertinent reports  EKG, Pathology, and Other Studies: I have personally reviewed pertinent reports  Counseling / Coordination of Care  Total floor / unit time spent today 30 minutes  Greater than 50% of total time was spent with the patient and / or family counseling and / or coordination of care  A description of the counseling / coordination of care:  Patient interview, physical examination, review of medical record, review of imaging and laboratory data, development of pain management and issue deep plan, discussion of plan with patient, patient's mother, primary service and nursing      Lyric Lemus PA-C

## 2020-08-27 PROBLEM — M25.551 RIGHT HIP PAIN: Status: ACTIVE | Noted: 2020-08-11

## 2020-08-27 PROCEDURE — 99232 SBSQ HOSP IP/OBS MODERATE 35: CPT | Performed by: PHYSICIAN ASSISTANT

## 2020-08-27 PROCEDURE — 99222 1ST HOSP IP/OBS MODERATE 55: CPT | Performed by: PSYCHIATRY & NEUROLOGY

## 2020-08-27 PROCEDURE — 99233 SBSQ HOSP IP/OBS HIGH 50: CPT | Performed by: GENERAL PRACTICE

## 2020-08-27 PROCEDURE — 99233 SBSQ HOSP IP/OBS HIGH 50: CPT | Performed by: INTERNAL MEDICINE

## 2020-08-27 RX ORDER — LORAZEPAM 2 MG/ML
3 INJECTION INTRAMUSCULAR ONCE
Status: COMPLETED | OUTPATIENT
Start: 2020-08-27 | End: 2020-08-27

## 2020-08-27 RX ORDER — OLANZAPINE 5 MG/1
5 TABLET ORAL
Status: DISCONTINUED | OUTPATIENT
Start: 2020-08-27 | End: 2020-09-09

## 2020-08-27 RX ADMIN — LORAZEPAM 3 MG: 2 INJECTION INTRAMUSCULAR; INTRAVENOUS at 21:25

## 2020-08-27 RX ADMIN — METHOCARBAMOL TABLETS 750 MG: 750 TABLET, COATED ORAL at 19:11

## 2020-08-27 RX ADMIN — DULOXETINE HYDROCHLORIDE 30 MG: 30 CAPSULE, DELAYED RELEASE ORAL at 09:36

## 2020-08-27 RX ADMIN — MIRTAZAPINE 30 MG: 30 TABLET, FILM COATED ORAL at 21:24

## 2020-08-27 RX ADMIN — LORAZEPAM 2 MG: 2 INJECTION INTRAMUSCULAR; INTRAVENOUS at 15:17

## 2020-08-27 RX ADMIN — MORPHINE SULFATE 3 MG: 4 INJECTION INTRAVENOUS at 04:08

## 2020-08-27 RX ADMIN — METHOCARBAMOL TABLETS 750 MG: 750 TABLET, COATED ORAL at 23:23

## 2020-08-27 RX ADMIN — METHOCARBAMOL TABLETS 750 MG: 750 TABLET, COATED ORAL at 05:33

## 2020-08-27 RX ADMIN — ENOXAPARIN SODIUM 60 MG: 60 INJECTION SUBCUTANEOUS at 21:29

## 2020-08-27 RX ADMIN — CEFAZOLIN SODIUM 2000 MG: 2 SOLUTION INTRAVENOUS at 23:23

## 2020-08-27 RX ADMIN — LORAZEPAM 2 MG: 2 INJECTION INTRAMUSCULAR; INTRAVENOUS at 07:44

## 2020-08-27 RX ADMIN — OXYCODONE HYDROCHLORIDE 15 MG: 10 TABLET ORAL at 07:38

## 2020-08-27 RX ADMIN — Medication 250 MG: at 17:48

## 2020-08-27 RX ADMIN — LORAZEPAM 2 MG: 2 INJECTION INTRAMUSCULAR; INTRAVENOUS at 04:08

## 2020-08-27 RX ADMIN — MORPHINE SULFATE 3 MG: 4 INJECTION INTRAVENOUS at 09:35

## 2020-08-27 RX ADMIN — OXYCODONE HYDROCHLORIDE 15 MG: 10 TABLET ORAL at 17:48

## 2020-08-27 RX ADMIN — ENOXAPARIN SODIUM 60 MG: 60 INJECTION SUBCUTANEOUS at 09:36

## 2020-08-27 RX ADMIN — OXYCODONE HYDROCHLORIDE 15 MG: 10 TABLET ORAL at 21:51

## 2020-08-27 RX ADMIN — OXYCODONE HYDROCHLORIDE 15 MG: 10 TABLET ORAL at 03:17

## 2020-08-27 RX ADMIN — LORAZEPAM 2 MG: 2 INJECTION INTRAMUSCULAR; INTRAVENOUS at 19:12

## 2020-08-27 RX ADMIN — Medication 250 MG: at 09:35

## 2020-08-27 RX ADMIN — SENNOSIDES 8.6 MG: 8.6 TABLET ORAL at 09:35

## 2020-08-27 RX ADMIN — GABAPENTIN 200 MG: 100 CAPSULE ORAL at 21:24

## 2020-08-27 RX ADMIN — GABAPENTIN 200 MG: 100 CAPSULE ORAL at 16:31

## 2020-08-27 RX ADMIN — MORPHINE SULFATE 3 MG: 4 INJECTION INTRAVENOUS at 19:16

## 2020-08-27 RX ADMIN — GABAPENTIN 200 MG: 100 CAPSULE ORAL at 09:35

## 2020-08-27 RX ADMIN — OLANZAPINE 5 MG: 5 TABLET, FILM COATED ORAL at 21:29

## 2020-08-27 RX ADMIN — CEFAZOLIN SODIUM 2000 MG: 2 SOLUTION INTRAVENOUS at 16:31

## 2020-08-27 RX ADMIN — CEFAZOLIN SODIUM 2000 MG: 2 SOLUTION INTRAVENOUS at 07:46

## 2020-08-27 RX ADMIN — MORPHINE SULFATE 3 MG: 4 INJECTION INTRAVENOUS at 15:17

## 2020-08-27 RX ADMIN — MORPHINE SULFATE 3 MG: 4 INJECTION INTRAVENOUS at 23:22

## 2020-08-27 RX ADMIN — OXYCODONE HYDROCHLORIDE 15 MG: 10 TABLET ORAL at 13:35

## 2020-08-27 RX ADMIN — METHOCARBAMOL TABLETS 750 MG: 750 TABLET, COATED ORAL at 13:34

## 2020-08-27 NOTE — ASSESSMENT & PLAN NOTE
· Recurrent MSSA bacteremia  · Initially admitted to [facility] from 07/15-blood culture were positive for MSSA bacteremia, but patient signed AMA on 07/22  Her repeat blood culture done on 7/21 was negative after 5 days  · [facility] on 07/30-blood culture came back positive for MSSA and she left again is medical advice on 08/04  Her blood cultures came back positive on 07/30, but the bacteremia cleared as of 8 /2  Patient left AMA on 08/04  · Readmitted to [address] [place] [address] on 08/10-blood culture came back positive for Staph aureus  · 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

## 2020-08-27 NOTE — ASSESSMENT & PLAN NOTE
Increase lovenox to 60mg Q12h  Due to severe pain will consult with vascular surgery for treatment options  Picc continues to function well and post recent blood culture is negative  Elevation of LUE  PICC team attempted to move picc line to right arm given left arm DVT pain  Unsuccessful   IR consulted   IR exchanged PICC 8/26 done

## 2020-08-27 NOTE — ASSESSMENT & PLAN NOTE
· Patient with history of drug abuse and likely the source of bacteremia  · Patient has a history of signing against medical advise  · May benefit from drug rehab eventually if she becomes agreeable to host services  She was not interested during my conversation  · During the hospital stay in July of this year patient was positive for [facility] ,cocaine and opiates  Although reported to Dr Meek Radford that she has not used any drugs for several years now

## 2020-08-27 NOTE — PROGRESS NOTES
Progress Note - Acute Pain Service    Gleda Ahumada 34 y o  female MRN: [id number]  Unit/Bed#: [facility] 526-01 Encounter: [id number]      Assessment:   Principal Problem:    Bacteremia due to Staphylococcus aureus  Active Problems:    Acute bacterial endocarditis    Septic embolism (HCC)    Bipolar 1 disorder (HCC)    Back pain    Intravenous drug abuse ([address] )    DVT of axillary vein, acute left (HCC)      Plan:   · Continue oxycodone 10 mg p o  q 4 hours p r n  moderate pain  · Continue oxycodone 15 mg p o  q 4 hours p r n  severe pain  · Continue morphine 3 mg IV q 3 hours p r n  breakthrough pain  · Continue duloxetine 30 mg p o  daily scheduled  Plan to increase on 6/1/20 to 60 mg   · Continue Ativan 2 mg IV q 3 hours p r n  agitation  · Continue gabapentin 200 mg p o  t i d  scheduled  · Continue methocarbamol 750 mg p o  q 6 hours scheduled  · Continue lidocaine 5% patch, 2 patches for 12 hours daily  · Continue bowel regimen to avoid opioid induced constipation  · Will document a plan for weaning opioids, benzodiazepines and gabapentin and will review with patient and primary service  This will serve as a guide for the remainder of the patient's stay in the hospital   Patient will be need to remain in the hospital for IV antibiotics for 6 weeks following the last negative blood culture which was drawn on 8/15/20  That will be approximately 4 weeks  APS will continue to follow; please contact APS ( [phone]) with any further questions    Pain History  Current pain location(s):  Right hip, lower back  Pain Scale:   10/10  Quality:  Sharp, burning  24 hour history:  Patient continues to complain of right hip pain  Recent CT scan of the lumbar spine, abdomen and pelvis shows no pathology and resolution of previously seen iliopsoas abscesses  There remains no obvious source for the patient's reported right hip and lower back pain    MRI of hip and lower back with anesthesia pending for today  Patient complains of mild constipation  She denies nausea, vomiting, diarrhea  Relates anxiety over weaning opioids however reiterates her desire to quit using  Opioid requirement previous 24 hours:  Oxycodone 75 mg p o , hydromorphone 19 mg IV       Meds/Allergies   all current active meds have been reviewed, current meds:   Current Facility-Administered Medications   Medication Dose Route Frequency    calcium carbonate (TUMS) chewable tablet 1,000 mg  1,000 mg Oral Daily PRN    ceFAZolin (ANCEF) IVPB (premix) 2,000 mg 50 mL  2,000 mg Intravenous Q8H    DULoxetine (CYMBALTA) delayed release capsule 30 mg  30 mg Oral Daily    enoxaparin (LOVENOX) subcutaneous injection 60 mg  1 mg/kg Subcutaneous Q12H [facility]    gabapentin (NEURONTIN) capsule 200 mg  200 mg Oral TID    iohexol (OMNIPAQUE) 240 MG/ML solution 50 mL  50 mL Oral 90 min pre-procedure    lidocaine (LIDODERM) 5 % patch 2 patch  2 patch Topical Daily    LORazepam (ATIVAN) injection 2 mg  2 mg Intravenous Q3H PRN    magnesium citrate (CITROMA) oral solution 296 mL  296 mL Oral Daily PRN    melatonin tablet 6 mg  6 mg Oral HS PRN    methocarbamol (ROBAXIN) tablet 750 mg  750 mg Oral Q6H [facility]    methylnaltrexone (RELISTOR) subcutaneous injection 12 mg  12 mg Subcutaneous Q48H PRN    mirtazapine (REMERON) tablet 30 mg  30 mg Oral HS    morphine (PF) 4 mg/mL injection 3 mg  3 mg Intravenous Q3H PRN    ondansetron (ZOFRAN) injection 4 mg  4 mg Intravenous Q6H PRN    oxyCODONE (ROXICODONE) immediate release tablet 10 mg  10 mg Oral Q4H PRN    oxyCODONE (ROXICODONE) IR tablet 15 mg  15 mg Oral Q4H PRN    saccharomyces boulardii (FLORASTOR) capsule 250 mg  250 mg Oral BID    senna (SENOKOT) tablet 8 6 mg  1 tablet Oral BID    and PTA meds:   None       Allergies   Allergen Reactions    Cat Hair Extract     Dog Epithelium     Latex     Pollen Extract        Objective     Temp:  [97 9 °F (36 6 °C)-98 1 °F (36 7 °C)] 98 °F (36 7 °C)  HR:  [104-112] 106  Resp:  [18-20] 19  BP: (105-114)/(59-72) 105/59    Physical Exam  Vitals signs and nursing note reviewed  Constitutional:       General: She is awake  She is not in acute distress  Eyes:      Pupils: Pupils are equal, round, and reactive to light  Cardiovascular:      Rate and Rhythm: Normal rate and regular rhythm  Skin:     General: Skin is warm and dry  Neurological:      General: No focal deficit present  Mental Status: She is alert and oriented to person, place, and time  GCS: GCS eye subscore is 4  GCS verbal subscore is 5  GCS motor subscore is 6  Psychiatric:         Behavior: Behavior is cooperative  Lab Results:   Results from last 7 days   Lab Units 08/25/20  0527   WBC Thousand/uL 5 86   HEMOGLOBIN g/dL 9 1*   HEMATOCRIT % 28 8*   PLATELETS Thousands/uL 446*      Results from last 7 days   Lab Units 08/25/20  0527 08/24/20  0453   POTASSIUM mmol/L 4 1 3 8   CHLORIDE mmol/L 102 103   CO2 mmol/L 30 30   BUN mg/dL 8 7   CREATININE mg/dL 0 48* 0 49*   CALCIUM mg/dL 8 7 8 6   ALK PHOS U/L  --  160*   ALT U/L  --  26   AST U/L  --  30       Imaging Studies: I have personally reviewed pertinent reports  EKG, Pathology, and Other Studies: I have personally reviewed pertinent reports  Counseling / Coordination of Care  Total floor / unit time spent today 30 minutes  Greater than 50% of total time was spent with the patient and / or family counseling and / or coordination of care  A description of the counseling / coordination of care:  Patient interview, physical examination, review of medical record, review of imaging and laboratory data, development of pain management plan including a written calendar of opioids/Gabapentin/benzodiazepine weaning, discussion of pain management plan with patient and primary service      Paulina Sauceda PA-C

## 2020-08-27 NOTE — ASSESSMENT & PLAN NOTE
· Patient noted to have tricuspid while vegetation on echocardiogram done in July  Patient had a transthoracic and also transesophageal echocardiogram done during the last hospital stay    · With septic emboli to lungs and posterior iliacus muscle  · Patient is transferred over here to be evaluated by CT surgery  · She was noncommittal to abstain from illegal drugs or unintended use of medications  · When she continues to abstain and completes iv abx treatment they would consider surgical correction - this will likely be as OP   · Continue IV cefazolin as above  · Keep on tele for now

## 2020-08-27 NOTE — PROGRESS NOTES
Progress Note - Gleda Ahumada 1990, 34 y o  female MRN: [id number]    Unit/Bed#: [facility] 526-01 Encounter: [id number]    Primary Care Provider: Linsey Loya PA-C   Date and time admitted to hospital: 8/12/2020  7:39 PM        * Bacteremia due to Staphylococcus aureus  Assessment & Plan  · Recurrent MSSA bacteremia  · Initially admitted to [address] [place] [address] from 07/15-blood culture were positive for MSSA bacteremia, but patient signed AMA on 07/22  Her repeat blood culture done on 7/21 was negative after 5 days  · [facility] on 07/30-blood culture came back positive for MSSA and she left again is medical advice on 08/04  Her blood cultures came back positive on 07/30, but the bacteremia cleared as of 8 /2  Patient left AMA on 08/04  · Readmitted to [address] [place] [address] on 08/10-blood culture came back positive for Staph aureus  · 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 during the last hospital stay from 7/15-7/22  · Plan is to continue IV cefazolin for a 6 week course per ID recommendations from 8/15 when B Cx became neg  · 8/26 removed PICC cx neg  · F/u B Cx from 8/26    DVT of axillary vein, acute left (HCC)  Assessment & Plan  Increase lovenox to 60mg Q12h  Due to severe pain will consult with vascular surgery for treatment options  Picc continues to function well and post recent blood culture is negative  Elevation of LUE  PICC team attempted to move picc line to right arm given left arm DVT pain  Unsuccessful  IR consulted   IR exchanged PICC 8/26    Intravenous drug abuse [facility])  Assessment & Plan  · Patient with history of drug abuse and likely the source of bacteremia  · Patient has a history of signing against medical advise  · May benefit from drug rehab eventually if she becomes agreeable to host services  She was not interested during my conversation    · During the hospital stay in July of this year patient was positive for [facility] ,cocaine and opiates  Although reported to Dr Parisa Caceres that she has not used any drugs for several years now  Right hip pain  Assessment & Plan  · Patient was complaining of severe back pain mainly in the lower part of the back and also in the sacral region  · Patient had MRI of the lumbar and thoracic spine during the recent hospital stay in [facility] which was unremarkable  · - continue robaxin to 750 mg q 6  · - continue oxy to 10 mg and 15 mg   · - continue iv morphine 4 mg to q3 hrs prn for breakthrough  · -continue tylenol atc pt can refuse (she states it makes her break out in profuse sweats)   · - continue lidocaine patch  · -continue gabapentin to 200mg TID - I offered increase in gabapentin but pt refused as she says it did not help in past and had w/drawal when she stopped it abruptly  · Iliacus muscle abscess is noted on CT scan, continue antibiotic treatment as above, no indication for drainage at this time  · Repeat CT scan shows improvement in septic emboli to iliacus muscle  · ketamine infusion discontinued  · Patient has been reluctant with interventions and procedures  She cites that she wants IV pain meds before intervention  Extensive discussion regarding acute pain service recommendations  Will proceed with following recommendations by acute pain service  · Dr Parisa Caceres spoke to pt today and pt agreed to MRI of hip w/ just Ativan and w/o anesthesia  · Hold off on thoracolumbar MRI as pain in right hip  · APS will come up w/ opioid taper which we will strictly follow as pt cannot be discharged on any opioids      Bipolar 1 disorder ([facility] [address] )  Assessment & Plan  · Patient today agreed to psych eval which is apprecaited  · Psych added Zyprexa  · C/w Cymbalta and Remeron    Septic embolism (HCC)  Assessment & Plan  · Patient noted to have abnormalities seen in the CT of the chest abdomen and pelvis concerning for septic emboli  · There is pleural effusion on the CT scan of the chest and also on repeat chest x-ray  · Patient is rather asymptomatic from pulmonary standpoint  · Continue with the antibiotics  · Thoracic surgery have evaluated the pt and she is not short of breath   · 8/15-imaging right hip shows no osseous involvement; concern for proximity of abscess status SI joint, but no SI joint involvement at this time  · Management as above under back pain    Acute bacterial endocarditis  Assessment & Plan  · Patient noted to have tricuspid while vegetation on echocardiogram done in July  Patient had a transthoracic and also transesophageal echocardiogram done during the last hospital stay  · With septic emboli to lungs and posterior iliacus muscle  · Patient is transferred over here to be evaluated by CT surgery  · She was noncommittal to abstain from illegal drugs or unintended use of medications  · When she continues to abstain and completes iv abx treatment they would consider surgical correction - this will likely be as OP   · Continue IV cefazolin as above  · Keep on tele for now    VTE Pharmacologic Prophylaxis:   Pharmacologic: Enoxaparin (Lovenox)  Mechanical VTE Prophylaxis in Place: Yes    Patient Centered Rounds: I have performed bedside rounds with nursing staff today  Discussions with Specialists or Other Care Team Provider: ID and MRI    Education and Discussions with Family / Patient: pt    Time Spent for Care: 30 minutes  More than 50% of total time spent on counseling and coordination of care as described above      Current Length of Stay: 15 day(s)    Current Patient Status: Inpatient   Certification Statement: The patient will continue to require additional inpatient hospital stay due to need for 6 weeks IV abx    Discharge Plan: when IV abx complete    Code Status: Level 1 - Full Code      Subjective:   C/o severe hip pain    Objective:     Vitals:   Temp (24hrs), Av °F (36 7 °C), Min:97 9 °F (36 6 °C), Max:98 °F (36 7 °C)    Temp:  [97 9 °F (36 6 °C)-98 °F (36 7 °C)] 98 °F (36 7 °C)  HR:  [102-112] 102  Resp:  [16-20] 16  BP: (105-113)/(59-72) 110/69  SpO2:  [92 %-97 %] 97 %  Body mass index is 23 3 kg/m²  Input and Output Summary (last 24 hours): Intake/Output Summary (Last 24 hours) at 8/27/2020 1754  Last data filed at 8/27/2020 0416  Gross per 24 hour   Intake 80 ml   Output 600 ml   Net -520 ml       Physical Exam:     Physical Exam  HENT:      Head: Normocephalic and atraumatic  Nose: Nose normal       Mouth/Throat:      Mouth: Mucous membranes are moist    Eyes:      Extraocular Movements: Extraocular movements intact  Conjunctiva/sclera: Conjunctivae normal    Neck:      Musculoskeletal: Normal range of motion and neck supple  Cardiovascular:      Rate and Rhythm: Normal rate and regular rhythm  Pulmonary:      Effort: Pulmonary effort is normal       Breath sounds: Normal breath sounds  No wheezing or rales  Abdominal:      General: Bowel sounds are normal  There is no distension  Palpations: Abdomen is soft  Tenderness: There is no abdominal tenderness  Musculoskeletal: Normal range of motion  General: Tenderness (right posterior hip pain radiates to knee) present  Skin:     General: Skin is warm and dry  Neurological:      Mental Status: She is alert and oriented to person, place, and time  Mental status is at baseline  Additional Data:     Labs:    Results from last 7 days   Lab Units 08/25/20  0527   WBC Thousand/uL 5 86   HEMOGLOBIN g/dL 9 1*   HEMATOCRIT % 28 8*   PLATELETS Thousands/uL 446*   NEUTROS PCT % 44   LYMPHS PCT % 40   MONOS PCT % 8   EOS PCT % 6     Results from last 7 days   Lab Units 08/25/20  0527 08/24/20  0453   POTASSIUM mmol/L 4 1 3 8   CHLORIDE mmol/L 102 103   CO2 mmol/L 30 30   BUN mg/dL 8 7   CREATININE mg/dL 0 48* 0 49*   CALCIUM mg/dL 8 7 8 6   ALK PHOS U/L  --  160*   ALT U/L  --  26   AST U/L  --  30           * I Have Reviewed All Lab Data Listed Above    * Additional Pertinent Lab Tests Reviewed: All [facility] Review[facility]        Recent Cultures (last 7 days):     Results from last 7 days   Lab Units 08/26/20  1615   BLOOD CULTURE  Received in Microbiology Lab  Culture in Progress  Last 24 Hours Medication List:   Current Facility-Administered Medications   Medication Dose Route Frequency Provider Last Rate    calcium carbonate  1,000 mg Oral Daily PRN Diamond Edwards MD      cefazolin  2,000 mg Intravenous Q8H Asha Roca PA-C 2,000 mg (08/27/20 1631)    DULoxetine  30 mg Oral Daily Hetul Reyna, DO      enoxaparin  1 mg/kg Subcutaneous Q12H [address] Sherren Pax, MD      gabapentin  200 mg Oral TID Hetul Reyna, DO      iohexol  50 mL Oral 90 min pre-procedure Hetul Reyna, DO      lidocaine  2 patch Topical Daily Asha Roca PA-C      LORazepam  2 mg Intravenous Q3H PRN Sherren Pax, MD      LORazepam  3 mg Intravenous Once Hetul Axel, DO      magnesium citrate  296 mL Oral Daily PRN Cristo Francis MD      melatonin  6 mg Oral HS PRN Asha Roca PA-C      methocarbamol  750 mg Oral Q6H [address] Sherren Pax, MD      methylnaltrexone bromide  12 mg Subcutaneous Q48H PRN Sherren Pax, MD      mirtazapine  30 mg Oral HS Diamond Edwards MD      morphine injection  3 mg Intravenous Q3H PRN Mychal Reyna,       OLANZapine  5 mg Oral HS Markos Delatorre MD      ondansetron  4 mg Intravenous Q6H PRN Diamond Edwards MD      oxyCODONE  10 mg Oral Q4H PRN BOO Calvert      oxyCODONE  15 mg Oral Q4H PRN BOO Calvert      saccharomyces boulardii  250 mg Oral BID Cristo Francis MD      senna  1 tablet Oral BID Cristo Francis MD          Today, Patient Was Seen By: Cande Michaels DO    ** Please Note: Dictation voice to text software may have been used in the creation of this document   **
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
## 2020-08-27 NOTE — ASSESSMENT & PLAN NOTE
· Patient today agreed to psych eval which is apprecaited  · Psych added Zyprexa  · C/w Cymbalta and Remeron

## 2020-08-27 NOTE — PROGRESS NOTES
Discussed case with infectious disease and Radiology  Will proceed with MRI of right hip  Preliminarily, patient appears to be agreeable to trying MRI without anesthesia  Will give slightly increased dose of Ativan prior to procedure  Will try to get done soon  Nursing aware of plan

## 2020-08-27 NOTE — PROGRESS NOTES
Progress Note - Infectious Disease   Patricia Law 34 y o  female MRN: [id number]  Unit/Bed#: [facility] 526-01 Encounter: [id number]      Impression/Plan:  1  Recurrent/persistent MSSA bacteremia with TV infective endocarditis   Blood cultures from 8/10 remain positive   Prolonged course of IV antibiotic was previously recommended, but patient has left AMA on 2 prior occasions (once at [facility] and once from [facility])    She remains hemodynamically stable despite her ongoing bacteremia  Repeat blood cultures negative     -continue cefazolin through 9/26/2020 to complete 6 weeks from the 1st negative blood culture  -follow up repeat blood cultures  -recheck CBC with diff and CMP  -follow-up PICC cath tip culture and change PICC site if culture positive     2  Tricuspid valve endocarditis, 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  7/21 MELISSA showed large vegetation on TV with severe regurgitation  7/15 CT abdomen/pelvis also showed bilateral renal parenchymal abnormalities concerning for septic emboli   No intra-abdominal abscess     -antibiotic plan as above  -CT surgery follow-up     3  Right iliacus muscle abscesses-very small and unlikely to be able to be drained   At the level of the mid SI joint but the SI joint appears okay   Now with increasing pain of unclear significance   Perhaps the infection is now spread into the SI joint although this is not overtly seen on CT scan   Her increased pain may be more related to the decreased morphine dose   However she continues to have severe pain    CT abdomen pelvis without new abscess  -antibiotics as above  -pain management  -check MRI of the right hip due to ongoing pain     4  Recent left foot cellulitis with abscess   Likely site of injection of IV drugs versus ectopic foci in the setting of MSSA bacteremia   This appears to have healed with no evidence of active infection   -antibiotic plan as above      5  Back pain   More likely secondary to chronic pain, opioid dependence   20 Thoracic and lumbar spine MRIs performed at [facility] negative were for abscess or osteomyelitis  CT scan nondiagnostic for osteomyelitis or diskitis   -monitor back pain   -check MRI lumbar spine  -serial exams  -acute pain service follow-up     6  Active IV heroin abuse   This is the causative factor for development of bacteremia and infective endocarditis   Patient has left AMA on prior occasions   HIV screen negative  -patient is not a candidate for at home PICC line/IV antibiotics  -acute pain service follow-up     7  Chronic HCV infection, transaminitis   Recent HIV was negative   The LFTs are waxing waning  -monitor LFTs      8  Left upper extremity DVT-in the setting of PICC line use   Patient now on anticoagulation  -vascular follow-up  -serial exam  -anticoagulation    Discussed the above management plan with the primary service    Antibiotics:  Cefazolin restart 18  Negative blood cultures 12    Subjective:  Patient has no fever, chills, sweats; no nausea, vomiting, diarrhea; no cough, shortness of breath; still having significant back and right leg pain  No new symptoms  Her PICC line was changed over a wire    Objective:  Vitals:  Temp:  [97 9 °F (36 6 °C)-98 1 °F (36 7 °C)] 98 °F (36 7 °C)  HR:  [104-112] 106  Resp:  [18-20] 19  BP: (105-114)/(59-72) 105/59  SpO2:  [92 %-96 %] 94 %  Temp (24hrs), Av °F (36 7 °C), Min:97 9 °F (36 6 °C), Max:98 1 °F (36 7 °C)  Current: Temperature: 98 °F (36 7 °C)    Physical Exam:   General Appearance:  Alert, interactive, nontoxic, no acute distress  Throat: Oropharynx moist without lesions  Lungs:   Clear to auscultation bilaterally; no wheezes, rhonchi or rales; respirations unlabored   Heart:  Tachycardic; no murmur, rub or gallop   Abdomen:   Soft, non-tender, non-distended, positive bowel sounds  Extremities: No clubbing, cyanosis or edema   Skin: No new rashes or lesions  No draining wounds noted  Labs, Imaging, & Other studies:   All pertinent labs and imaging studies were personally reviewed  Results from last 7 days   Lab Units 08/25/20  0527 08/24/20  0453 08/21/20  0459   WBC Thousand/uL 5 86 5 53 8 70   HEMOGLOBIN g/dL 9 1* 10 2* 7 9*   PLATELETS Thousands/uL 446* 425* 478*     Results from last 7 days   Lab Units 08/25/20  0527 08/24/20  0453 08/21/20  0459   SODIUM mmol/L 138 139 137   POTASSIUM mmol/L 4 1 3 8 4 1   CHLORIDE mmol/L 102 103 101   CO2 mmol/L 30 30 29   BUN mg/dL 8 7 12   CREATININE mg/dL 0 48* 0 49* 0 50*   EGFR ml/min/1 73sq m 135 134 133   CALCIUM mg/dL 8 7 8 6 8 9   AST U/L  --  30 78*   ALT U/L  --  26 52   ALK PHOS U/L  --  160* 161*     Results from last 7 days   Lab Units 08/26/20  1615   BLOOD CULTURE  Received in Microbiology Lab  Culture in Progress  CT lumbar spine-no diskitis or osteomyelitis    CT abdomen pelvis-no abscess seen      Images personally reviewed by me in PACS

## 2020-08-28 LAB
ALBUMIN SERPL BCP-MCNC: 2.4 G/DL (ref 3.5–5)
ALP SERPL-CCNC: 161 U/L (ref 46–116)
ALT SERPL W P-5'-P-CCNC: 19 U/L (ref 12–78)
ANION GAP SERPL CALCULATED.3IONS-SCNC: 4 MMOL/L (ref 4–13)
AST SERPL W P-5'-P-CCNC: 40 U/L (ref 5–45)
BILIRUB SERPL-MCNC: 0.21 MG/DL (ref 0.2–1)
BUN SERPL-MCNC: 8 MG/DL (ref 5–25)
CALCIUM SERPL-MCNC: 8.4 MG/DL (ref 8.3–10.1)
CHLORIDE SERPL-SCNC: 105 MMOL/L (ref 100–108)
CO2 SERPL-SCNC: 29 MMOL/L (ref 21–32)
CREAT SERPL-MCNC: 0.57 MG/DL (ref 0.6–1.3)
ERYTHROCYTE [DISTWIDTH] IN BLOOD BY AUTOMATED COUNT: 15.6 % (ref 11.6–15.1)
GFR SERPL CREATININE-BSD FRML MDRD: 127 ML/MIN/1.73SQ M
GLUCOSE SERPL-MCNC: 108 MG/DL (ref 65–140)
HCT VFR BLD AUTO: 27.5 % (ref 34.8–46.1)
HGB BLD-MCNC: 8.4 G/DL (ref 11.5–15.4)
MAGNESIUM SERPL-MCNC: 2 MG/DL (ref 1.6–2.6)
MCH RBC QN AUTO: 26.7 PG (ref 26.8–34.3)
MCHC RBC AUTO-ENTMCNC: 30.5 G/DL (ref 31.4–37.4)
MCV RBC AUTO: 87 FL (ref 82–98)
PHOSPHATE SERPL-MCNC: 5.4 MG/DL (ref 2.7–4.5)
PLATELET # BLD AUTO: 355 THOUSANDS/UL (ref 149–390)
PMV BLD AUTO: 8.5 FL (ref 8.9–12.7)
POTASSIUM SERPL-SCNC: 3.8 MMOL/L (ref 3.5–5.3)
PROT SERPL-MCNC: 7.6 G/DL (ref 6.4–8.2)
RBC # BLD AUTO: 3.15 MILLION/UL (ref 3.81–5.12)
SODIUM SERPL-SCNC: 138 MMOL/L (ref 136–145)
WBC # BLD AUTO: 5.1 THOUSAND/UL (ref 4.31–10.16)

## 2020-08-28 PROCEDURE — 85027 COMPLETE CBC AUTOMATED: CPT | Performed by: GENERAL PRACTICE

## 2020-08-28 PROCEDURE — 80053 COMPREHEN METABOLIC PANEL: CPT | Performed by: GENERAL PRACTICE

## 2020-08-28 PROCEDURE — 99232 SBSQ HOSP IP/OBS MODERATE 35: CPT | Performed by: GENERAL PRACTICE

## 2020-08-28 PROCEDURE — 99232 SBSQ HOSP IP/OBS MODERATE 35: CPT | Performed by: INTERNAL MEDICINE

## 2020-08-28 PROCEDURE — 84100 ASSAY OF PHOSPHORUS: CPT | Performed by: GENERAL PRACTICE

## 2020-08-28 PROCEDURE — 99233 SBSQ HOSP IP/OBS HIGH 50: CPT | Performed by: PHYSICIAN ASSISTANT

## 2020-08-28 PROCEDURE — 83735 ASSAY OF MAGNESIUM: CPT | Performed by: GENERAL PRACTICE

## 2020-08-28 RX ORDER — ACETAMINOPHEN 325 MG/1
650 TABLET ORAL EVERY 6 HOURS PRN
Status: DISCONTINUED | OUTPATIENT
Start: 2020-08-28 | End: 2020-09-13

## 2020-08-28 RX ORDER — LORAZEPAM 2 MG/ML
3 INJECTION INTRAMUSCULAR
Status: COMPLETED | OUTPATIENT
Start: 2020-08-28 | End: 2020-08-30

## 2020-08-28 RX ADMIN — OXYCODONE HYDROCHLORIDE 15 MG: 10 TABLET ORAL at 12:47

## 2020-08-28 RX ADMIN — METHOCARBAMOL TABLETS 750 MG: 750 TABLET, COATED ORAL at 23:31

## 2020-08-28 RX ADMIN — OXYCODONE HYDROCHLORIDE 15 MG: 10 TABLET ORAL at 08:29

## 2020-08-28 RX ADMIN — MORPHINE SULFATE 3 MG: 4 INJECTION INTRAVENOUS at 06:04

## 2020-08-28 RX ADMIN — OLANZAPINE 5 MG: 5 TABLET, FILM COATED ORAL at 21:09

## 2020-08-28 RX ADMIN — OXYCODONE HYDROCHLORIDE 15 MG: 10 TABLET ORAL at 22:07

## 2020-08-28 RX ADMIN — CEFAZOLIN SODIUM 2000 MG: 2 SOLUTION INTRAVENOUS at 08:30

## 2020-08-28 RX ADMIN — GABAPENTIN 200 MG: 100 CAPSULE ORAL at 08:28

## 2020-08-28 RX ADMIN — OXYCODONE HYDROCHLORIDE 15 MG: 10 TABLET ORAL at 18:00

## 2020-08-28 RX ADMIN — METHOCARBAMOL TABLETS 750 MG: 750 TABLET, COATED ORAL at 18:00

## 2020-08-28 RX ADMIN — MORPHINE SULFATE 3 MG: 4 INJECTION INTRAVENOUS at 23:30

## 2020-08-28 RX ADMIN — LORAZEPAM 2 MG: 2 INJECTION INTRAMUSCULAR; INTRAVENOUS at 04:25

## 2020-08-28 RX ADMIN — MIRTAZAPINE 30 MG: 30 TABLET, FILM COATED ORAL at 21:09

## 2020-08-28 RX ADMIN — MORPHINE SULFATE 3 MG: 4 INJECTION INTRAVENOUS at 10:22

## 2020-08-28 RX ADMIN — GABAPENTIN 200 MG: 100 CAPSULE ORAL at 21:09

## 2020-08-28 RX ADMIN — CEFAZOLIN SODIUM 2000 MG: 2 SOLUTION INTRAVENOUS at 15:14

## 2020-08-28 RX ADMIN — ENOXAPARIN SODIUM 60 MG: 60 INJECTION SUBCUTANEOUS at 08:31

## 2020-08-28 RX ADMIN — Medication 250 MG: at 18:00

## 2020-08-28 RX ADMIN — METHOCARBAMOL TABLETS 750 MG: 750 TABLET, COATED ORAL at 06:01

## 2020-08-28 RX ADMIN — Medication 250 MG: at 08:28

## 2020-08-28 RX ADMIN — LORAZEPAM 2 MG: 2 INJECTION INTRAMUSCULAR; INTRAVENOUS at 18:03

## 2020-08-28 RX ADMIN — ENOXAPARIN SODIUM 60 MG: 60 INJECTION SUBCUTANEOUS at 21:09

## 2020-08-28 RX ADMIN — GABAPENTIN 200 MG: 100 CAPSULE ORAL at 15:13

## 2020-08-28 RX ADMIN — DULOXETINE HYDROCHLORIDE 30 MG: 30 CAPSULE, DELAYED RELEASE ORAL at 08:31

## 2020-08-28 RX ADMIN — CEFAZOLIN SODIUM 2000 MG: 2 SOLUTION INTRAVENOUS at 23:39

## 2020-08-28 RX ADMIN — MORPHINE SULFATE 3 MG: 4 INJECTION INTRAVENOUS at 15:12

## 2020-08-28 RX ADMIN — OXYCODONE HYDROCHLORIDE 15 MG: 10 TABLET ORAL at 04:19

## 2020-08-28 RX ADMIN — LORAZEPAM 2 MG: 2 INJECTION INTRAMUSCULAR; INTRAVENOUS at 10:54

## 2020-08-28 RX ADMIN — MORPHINE SULFATE 3 MG: 4 INJECTION INTRAVENOUS at 19:41

## 2020-08-28 RX ADMIN — LORAZEPAM 2 MG: 2 INJECTION INTRAMUSCULAR; INTRAVENOUS at 21:21

## 2020-08-28 NOTE — PLAN OF CARE
Problem: Prexisting or High Potential for Compromised Skin Integrity  Goal: Skin integrity is maintained or improved  Description: INTERVENTIONS:  - Identify patients at risk for skin breakdown  - Assess and monitor skin integrity  - Assess and monitor nutrition and hydration status  - Monitor labs   - Assess for incontinence   - Turn and reposition patient  - Assist with mobility/ambulation  - Relieve pressure over bony prominences  - Avoid friction and shearing  - Provide appropriate hygiene as needed including keeping skin clean and dry  - Evaluate need for skin moisturizer/barrier cream  - Collaborate with interdisciplinary team   - Patient/family teaching  - Consider wound care consult   Outcome: Progressing     Problem: PAIN - ADULT  Goal: Verbalizes/displays adequate comfort level or baseline comfort level  Description: Interventions:  - Encourage patient to monitor pain and request assistance  - Assess pain using appropriate pain scale  - Administer analgesics based on type and severity of pain and evaluate response  - Implement non-pharmacological measures as appropriate and evaluate response  - Consider cultural and social influences on pain and pain management  - Notify physician/advanced practitioner if interventions unsuccessful or patient reports new pain  Outcome: Progressing     Problem: INFECTION - ADULT  Goal: Absence or prevention of progression during hospitalization  Description: INTERVENTIONS:  - Assess and monitor for signs and symptoms of infection  - Monitor lab/diagnostic results  - Monitor all insertion sites, i e  indwelling lines, tubes, and drains  - Monitor endotracheal if appropriate and nasal secretions for changes in amount and color  - [place] appropriate cooling/warming therapies per order  - Administer medications as ordered  - Instruct and encourage patient and family to use good hand hygiene technique  - Identify and instruct in appropriate isolation precautions for identified infection/condition  Outcome: Progressing  Goal: Absence of fever/infection during neutropenic period  Description: INTERVENTIONS:  - Monitor WBC    Outcome: Progressing     Problem: SAFETY ADULT  Goal: Patient will remain free of falls  Description: INTERVENTIONS:  - Assess patient frequently for physical needs  -  Identify cognitive and physical deficits and behaviors that affect risk of falls    -  [place] fall precautions as indicated by assessment   - Educate patient/family on patient safety including physical limitations  - Instruct patient to call for assistance with activity based on assessment  - Modify environment to reduce risk of injury  - Consider OT/PT consult to assist with strengthening/mobility  Outcome: Progressing  Goal: Maintain or return to baseline ADL function  Description: INTERVENTIONS:  -  Assess patient's ability to carry out ADLs; assess patient's baseline for ADL function and identify physical deficits which impact ability to perform ADLs (bathing, care of mouth/teeth, toileting, grooming, dressing, etc )  - Assess/evaluate cause of self-care deficits   - Assess range of motion  - Assess patient's mobility; develop plan if impaired  - Assess patient's need for assistive devices and provide as appropriate  - Encourage maximum independence but intervene and supervise when necessary  - Involve family in performance of ADLs  - Assess for home care needs following discharge   - Consider OT consult to assist with ADL evaluation and planning for discharge  - Provide patient education as appropriate  Outcome: Progressing  Goal: Maintain or return mobility status to optimal level  Description: INTERVENTIONS:  - Assess patient's baseline mobility status (ambulation, transfers, stairs, etc )    - Identify cognitive and physical deficits and behaviors that affect mobility  - Identify mobility aids required to assist with transfers and/or ambulation (gait belt, sit-to-stand, lift, walker, cane, etc )  - [place] fall precautions as indicated by assessment  - Record patient progress and toleration of activity level on Mobility SBAR; progress patient to next Phase/Stage  - Instruct patient to call for assistance with activity based on assessment  - Consider rehabilitation consult to assist with strengthening/weightbearing, etc   Outcome: Progressing     Problem: CARDIOVASCULAR - ADULT  Goal: Maintains optimal cardiac output and hemodynamic stability  Description: INTERVENTIONS:  - Monitor I/O, vital signs and rhythm  - Monitor for S/S and trends of decreased cardiac output  - Administer and titrate ordered vasoactive medications to optimize hemodynamic stability  - Assess quality of pulses, skin color and temperature  - Assess for signs of decreased coronary artery perfusion  - Instruct patient to report change in severity of symptoms  Outcome: Progressing  Goal: Absence of cardiac dysrhythmias or at baseline rhythm  Description: INTERVENTIONS:  - Continuous cardiac monitoring, vital signs, obtain 12 lead EKG if ordered  - Administer antiarrhythmic and heart rate control medications as ordered  - Monitor electrolytes and administer replacement therapy as ordered  Outcome: Progressing     Problem: METABOLIC, FLUID AND ELECTROLYTES - ADULT  Goal: Electrolytes maintained within normal limits  Description: INTERVENTIONS:  - Monitor labs and assess patient for signs and symptoms of electrolyte imbalances  - Administer electrolyte replacement as ordered  - Monitor response to electrolyte replacements, including repeat lab results as appropriate  - Instruct patient on fluid and nutrition as appropriate  Outcome: Progressing  Goal: Fluid balance maintained  Description: INTERVENTIONS:  - Monitor labs   - Monitor I/O and WT  - Instruct patient on fluid and nutrition as appropriate  - Assess for signs & symptoms of volume excess or deficit  Outcome: Progressing  Goal: Glucose maintained within target range  Description: INTERVENTIONS:  - Monitor Blood Glucose as ordered  - Assess for signs and symptoms of hyperglycemia and hypoglycemia  - Administer ordered medications to maintain glucose within target range  - Assess nutritional intake and initiate nutrition service referral as needed  Outcome: Progressing     Problem: HEMATOLOGIC - ADULT  Goal: Maintains hematologic stability  Description: INTERVENTIONS  - Assess for signs and symptoms of bleeding or hemorrhage  - Monitor labs  - Administer supportive blood products/factors as ordered and appropriate  Outcome: Progressing     Problem: Potential for Falls  Goal: Patient will remain free of falls  Description: INTERVENTIONS:  - Assess patient frequently for physical needs  -  Identify cognitive and physical deficits and behaviors that affect risk of falls  -  [place] fall precautions as indicated by assessment   - Educate patient/family on patient safety including physical limitations  - Instruct patient to call for assistance with activity based on assessment  - Modify environment to reduce risk of injury  - Consider OT/PT consult to assist with strengthening/mobility  Outcome: Progressing     Problem: Nutrition/Hydration-ADULT  Goal: Nutrient/Hydration intake appropriate for improving, restoring or maintaining nutritional needs  Description: Monitor and assess patient's nutrition/hydration status for malnutrition  Collaborate with interdisciplinary team and initiate plan and interventions as ordered  Monitor patient's weight and dietary intake as ordered or per policy  Utilize nutrition screening tool and intervene as necessary  Determine patient's food preferences and provide high-protein, high-caloric foods as appropriate       INTERVENTIONS:  - Monitor oral intake, urinary output, labs, and treatment plans  - Assess nutrition and hydration status and recommend course of action  - Evaluate amount of meals eaten  - Assist patient with eating if necessary - Allow adequate time for meals  - Recommend/ encourage appropriate diets, oral nutritional supplements, and vitamin/mineral supplements  - Order, calculate, and assess calorie counts as needed  - Recommend, monitor, and adjust tube feedings and TPN/PPN based on assessed needs  - Assess need for intravenous fluids  - Provide specific nutrition/hydration education as appropriate  - Include patient/family/caregiver in decisions related to nutrition  Outcome: Progressing     Problem: Knowledge Deficit  Goal: Patient/family/caregiver demonstrates understanding of disease process, treatment plan, medications, and discharge instructions  Description: Complete learning assessment and assess knowledge base    Interventions:  - Provide teaching at level of understanding  - Provide teaching via preferred learning methods  Outcome: Progressing

## 2020-08-28 NOTE — ASSESSMENT & PLAN NOTE
· Recurrent MSSA bacteremia  · Initially admitted to [facility] from 07/15-blood culture were positive for MSSA bacteremia, but patient signed AMA on 07/22  Her repeat blood culture done on 7/21 was negative after 5 days  · [facility] on 07/30-blood culture came back positive for MSSA and she left again is medical advice on 08/04  Her blood cultures came back positive on 07/30, but the bacteremia cleared as of 8 /2  Patient left AMA on 08/04  · Readmitted to [address] [place] [address] on 08/10-blood culture came back positive for Staph aureus  · 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

## 2020-08-28 NOTE — ASSESSMENT & PLAN NOTE
· Patient noted to have abnormalities seen in the CT of the chest abdomen and pelvis concerning for septic emboli  · There is pleural effusion on the CT scan of the chest and also on repeat chest x-ray  · Patient is rather asymptomatic from pulmonary standpoint  · Continue with the antibiotics  · Thoracic surgery have evaluated the pt and she is not short of breath   · 8/15-imaging right hip shows no osseous involvement; concern for proximity of abscess status SI joint, but no SI joint involvement at this time     · Management as above under hip pain

## 2020-08-28 NOTE — PROGRESS NOTES
Progress Note - Acute Pain Service    Eduar Johnson 34 y o  female MRN: [id number]  Unit/Bed#: [facility] 526-01 Encounter: [id number]      Assessment:   Principal Problem:    Bacteremia due to Staphylococcus aureus  Active Problems:    Acute bacterial endocarditis    Septic embolism (HCC)    Bipolar 1 disorder (HCC)    Right hip pain    Intravenous drug abuse ([address] )    DVT of axillary vein, acute left (HCC)      Plan:   · Continue acetaminophen 650 mg p o  Q 6 hours p r n  mild pain or fever  · Continue oxycodone 10 mg p o  q 4 hours p r n  moderate pain  · Continue oxycodone 15 mg p o  q 4 hours p r n  severe pain  · Continue morphine 3 mg IV q 3 hours p r n  breakthrough pain  · Continue duloxetine 30 mg p o  daily  Plan to increase to 60 mg p o  daily on 9/1/20  · Continue lorazepam 2 mg IV q 3 hours p r n  agitation/anxiety  · Continue gabapentin 200 mg p o  t i d  scheduled  · Continue methocarbamol 750 mg p o  q 6 hours scheduled  · Continue lidocaine 5% patch, 2 patches for 12 hours daily  · A plan for weaning opioids, benzodiazepines and gabapentin was developed and documented on a calendar  This was reviewed with the patient, the patient's mother and the primary service  Patient agreed with the planned wean documented on this calendar  A copy of the calendar was uploaded to the media tab in the patient's chart  Primary service was made aware of this and were given a copy on paper  APS will continue to follow; please contact [facility]PS ( btwn [phone]) with any further questions    Pain History  Current pain location(s):  Lower back, right hip  Pain Scale:   10/10  Quality:  Sharp  24 hour history:  Patient continues to complain of severe right hip pain and back pain  She declined MRI last night after receiving 3 mg of IV Ativan stating that she did not feel sedated enough to tolerate  Primary service aware of this  Reviewed weaning plan as documented above    Patient agreeable  Opioid requirement previous 24 hours:  Oxycodone 75 mg p o , morphine 18 mg IV       Meds/Allergies   all current active meds have been reviewed, current meds:   Current Facility-Administered Medications   Medication Dose Route Frequency    acetaminophen (TYLENOL) tablet 650 mg  650 mg Oral Q6H PRN    calcium carbonate (TUMS) chewable tablet 1,000 mg  1,000 mg Oral Daily PRN    ceFAZolin (ANCEF) IVPB (premix) 2,000 mg 50 mL  2,000 mg Intravenous Q8H    DULoxetine (CYMBALTA) delayed release capsule 30 mg  30 mg Oral Daily    enoxaparin (LOVENOX) subcutaneous injection 60 mg  1 mg/kg Subcutaneous Q12H [address]    gabapentin (NEURONTIN) capsule 200 mg  200 mg Oral TID    iohexol (OMNIPAQUE) 240 MG/ML solution 50 mL  50 mL Oral 90 min pre-procedure    lidocaine (LIDODERM) 5 % patch 2 patch  2 patch Topical Daily    LORazepam (ATIVAN) injection 2 mg  2 mg Intravenous Q3H PRN    LORazepam (ATIVAN) injection 3 mg  3 mg Intravenous Q30 Min PRN    magnesium citrate (CITROMA) oral solution 296 mL  296 mL Oral Daily PRN    melatonin tablet 6 mg  6 mg Oral HS PRN    methocarbamol (ROBAXIN) tablet 750 mg  750 mg Oral Q6H JEFF    methylnaltrexone (RELISTOR) subcutaneous injection 12 mg  12 mg Subcutaneous Q48H PRN    mirtazapine (REMERON) tablet 30 mg  30 mg Oral HS    morphine (PF) 4 mg/mL injection 3 mg  3 mg Intravenous Q3H PRN    OLANZapine (ZyPREXA) tablet 5 mg  5 mg Oral HS    ondansetron (ZOFRAN) injection 4 mg  4 mg Intravenous Q6H PRN    oxyCODONE (ROXICODONE) immediate release tablet 10 mg  10 mg Oral Q4H PRN    oxyCODONE (ROXICODONE) IR tablet 15 mg  15 mg Oral Q4H PRN    saccharomyces boulardii (FLORASTOR) capsule 250 mg  250 mg Oral BID    senna (SENOKOT) tablet 8 6 mg  1 tablet Oral BID    and PTA meds:   None       Allergies   Allergen Reactions    Cat Hair Extract     Dog Epithelium     Latex     Pollen Extract        Objective     Temp:  [98 °F (36 7 °C)-98 1 °F (36 7 °C)] 98 1 °F (36 7 °C)  HR:  [102-109] 109  Resp:  [16-18] 18  BP: (106-111)/(58-69) 106/62    Physical Exam  Vitals signs and nursing note reviewed  Constitutional:       General: She is awake  She is not in acute distress  Eyes:      Conjunctiva/sclera: Conjunctivae normal       Pupils: Pupils are equal, round, and reactive to light  Cardiovascular:      Rate and Rhythm: Normal rate and regular rhythm  Skin:     General: Skin is warm and dry  Neurological:      General: No focal deficit present  Mental Status: She is alert and oriented to person, place, and time  GCS: GCS eye subscore is 4  GCS verbal subscore is 5  GCS motor subscore is 6  Psychiatric:         Behavior: Behavior is cooperative  Lab Results:   Results from last 7 days   Lab Units 08/28/20  0618   WBC Thousand/uL 5 10   HEMOGLOBIN g/dL 8 4*   HEMATOCRIT % 27 5*   PLATELETS Thousands/uL 355      Results from last 7 days   Lab Units 08/28/20  0618   POTASSIUM mmol/L 3 8   CHLORIDE mmol/L 105   CO2 mmol/L 29   BUN mg/dL 8   CREATININE mg/dL 0 57*   CALCIUM mg/dL 8 4   ALK PHOS U/L 161*   ALT U/L 19   AST U/L 40       Imaging Studies: I have personally reviewed pertinent reports  EKG, Pathology, and Other Studies: I have personally reviewed pertinent reports  Counseling / Coordination of Care  Total floor / unit time spent today 30 minutes  Greater than 50% of total time was spent with the patient and / or family counseling and / or coordination of care  A description of the counseling / coordination of care:  Patient interview, physical examination, review of medical record, review of imaging and laboratory data, discussion regarding opioid/benzodiazepine/Gabapentinoid wean calender, development of pain management plan, discussion pain management plan with patient, patient's mother, nursing and primary service      Candy Seals PA-C

## 2020-08-28 NOTE — ASSESSMENT & PLAN NOTE
· Patient noted to have tricuspid while vegetation on echocardiogram done in July  Patient had a transthoracic and also transesophageal echocardiogram done during the last hospital stay    · With septic emboli to lungs and posterior iliacus muscle  · Patient is transferred over here to be evaluated by CT surgery  · She was noncommittal to abstain from illegal drugs or unintended use of medications  · When she continues to abstain and completes iv abx treatment they would consider surgical correction - this will likely be as OP   · Continue IV cefazolin as above  · Keep on tele for now - will likely d/c tomorrow if cx remain neg

## 2020-08-28 NOTE — ASSESSMENT & PLAN NOTE
· Patient was complaining of severe back pain mainly in the lower part of the back and also in the sacral region  · Patient had MRI of the lumbar and thoracic spine during the recent hospital stay in [facility] which was unremarkable  · - continue Cymbalta 30 mg daily - Plan to increase to 60 mg daily on 9/1  · - continue robaxin to 750 mg q 6  · - continue oxy to 10 mg and 15 mg   · - continue iv morphine 4 mg to q3 hrs prn for breakthrough  · -continue tylenol atc pt can refuse (she states it makes her break out in profuse sweats)   · - continue lidocaine patch  · -continue gabapentin to 200mg TID - I offered increase in gabapentin but pt refused as she says it did not help in past and had w/drawal when she stopped it abruptly  · Iliacus muscle abscess is noted on CT scan, continue antibiotic treatment as above, no indication for drainage at this time  · Repeat CT scan shows improvement in septic emboli to iliacus muscle  · ketamine infusion discontinued  · Patient has been reluctant with interventions and procedures  She cites that she wants IV pain meds before intervention  Extensive discussion regarding acute pain service recommendations  Will proceed with following recommendations by acute pain service  · Pt unable to tolerate MRI w/ 3 mg IV Ativan    Will order 3 mg IV Ativan 30 min before MRI and then plan to give additional 3 mg at MRI if needed  · Hold off on thoracolumbar MRI as pain in right hip and prior MRI unremarkable  · Opioid, benzo, and Neurontin taper scanned into media  · Plan to decrease Neurontin to 100 mg tid and and morphine to 2 mg q3h prn 8/31

## 2020-08-28 NOTE — ASSESSMENT & PLAN NOTE
· Patient with history of drug abuse and likely the source of bacteremia  · Patient has a history of signing against medical advise  · May benefit from drug rehab eventually if she becomes agreeable to host services  She was not interested during my conversation  · During the hospital stay in July of this year patient was positive for [facility] ,cocaine and opiates  Although pt reported to Dr Millicent Ansari that she had not used any drugs for several years now

## 2020-08-28 NOTE — ASSESSMENT & PLAN NOTE
· Patient 8/27 agreed to psych eval which is apprecaited  · Psych added Zyprexa  · C/w Cymbalta and Remeron

## 2020-08-28 NOTE — PROGRESS NOTES
Progress Note - Narendra Genao 1990, 34 y o  female MRN: [id number]    Unit/Bed#: [facility] 526-01 Encounter: [id number]    Primary Care Provider: Erika Ahn PA-C   Date and time admitted to hospital: 8/12/2020  7:39 PM        * Bacteremia due to Staphylococcus aureus  Assessment & Plan  · Recurrent MSSA bacteremia  · Initially admitted to [facility] from 07/15-blood culture were positive for MSSA bacteremia, but patient signed AMA on 07/22  Her repeat blood culture done on 7/21 was negative after 5 days  · [facility] on 07/30-blood culture came back positive for MSSA and she left again is medical advice on 08/04  Her blood cultures came back positive on 07/30, but the bacteremia cleared as of 8 /2  Patient left AMA on 08/04  · Readmitted to [address] [place] [address] on 08/10-blood culture came back positive for Staph aureus  · 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 during the last hospital stay from 7/15-7/22  · Plan is to continue IV cefazolin for a 6 week course per ID recommendations from 8/15 when B Cx became neg  · 8/26 removed PICC and PICC cx neg  · B Cx from 8/26 neg    DVT of axillary vein, acute left (HCC)  Assessment & Plan  Increase lovenox to 60mg Q12h  Due to severe pain consulted with vascular surgery for treatment options - appreciated  Picc continues to function well and post recent blood culture is negative  Elevation of LUE  PICC team attempted to move picc line to right arm given left arm DVT pain  Unsuccessful  IR consulted   IR exchanged PICC 8/26    Intravenous drug abuse [facility])  Assessment & Plan  · Patient with history of drug abuse and likely the source of bacteremia  · Patient has a history of signing against medical advise  · May benefit from drug rehab eventually if she becomes agreeable to host services  She was not interested during my conversation    · During the hospital stay in July of this year patient was positive for THC ,cocaine and opiates  Although pt reported to Dr Dmitriy Hernandez that she had not used any drugs for several years now  Right hip pain  Assessment & Plan  · Patient was complaining of severe back pain mainly in the lower part of the back and also in the sacral region  · Patient had MRI of the lumbar and thoracic spine during the recent hospital stay in [facility] which was unremarkable  · - continue Cymbalta 30 mg daily - Plan to increase to 60 mg daily on 9/1  · - continue robaxin to 750 mg q 6  · - continue oxy to 10 mg and 15 mg   · - continue iv morphine 4 mg to q3 hrs prn for breakthrough  · -continue tylenol atc pt can refuse (she states it makes her break out in profuse sweats)   · - continue lidocaine patch  · -continue gabapentin to 200mg TID - I offered increase in gabapentin but pt refused as she says it did not help in past and had w/drawal when she stopped it abruptly  · Iliacus muscle abscess is noted on CT scan, continue antibiotic treatment as above, no indication for drainage at this time  · Repeat CT scan shows improvement in septic emboli to iliacus muscle  · ketamine infusion discontinued  · Patient has been reluctant with interventions and procedures  She cites that she wants IV pain meds before intervention  Extensive discussion regarding acute pain service recommendations  Will proceed with following recommendations by acute pain service  · Pt unable to tolerate MRI w/ 3 mg IV Ativan    Will order 3 mg IV Ativan 30 min before MRI and then plan to give additional 3 mg at MRI if needed  · Hold off on thoracolumbar MRI as pain in right hip and prior MRI unremarkable  · Opioid, benzo, and Neurontin taper scanned into media  · Plan to decrease Neurontin to 100 mg tid and and morphine to 2 mg q3h prn 8/31    Bipolar 1 disorder ([facility] [address] )  Assessment & Plan  · Patient 8/27 agreed to psych eval which is apprecaited  · Psych added Zyprexa  · C/w Cymbalta and Remeron    Septic embolism ([facility] [address] )  Assessment & Plan  · Patient noted to have abnormalities seen in the CT of the chest abdomen and pelvis concerning for septic emboli  · There is pleural effusion on the CT scan of the chest and also on repeat chest x-ray  · Patient is rather asymptomatic from pulmonary standpoint  · Continue with the antibiotics  · Thoracic surgery have evaluated the pt and she is not short of breath   · 8/15-imaging right hip shows no osseous involvement; concern for proximity of abscess status SI joint, but no SI joint involvement at this time  · Management as above under hip pain    Acute bacterial endocarditis  Assessment & Plan  · Patient noted to have tricuspid while vegetation on echocardiogram done in July  Patient had a transthoracic and also transesophageal echocardiogram done during the last hospital stay  · With septic emboli to lungs and posterior iliacus muscle  · Patient is transferred over here to be evaluated by CT surgery  · She was noncommittal to abstain from illegal drugs or unintended use of medications  · When she continues to abstain and completes iv abx treatment they would consider surgical correction - this will likely be as OP   · Continue IV cefazolin as above  · Keep on tele for now - will likely d/c tomorrow if cx remain neg    VTE Pharmacologic Prophylaxis:   Pharmacologic: Enoxaparin (Lovenox)  Mechanical VTE Prophylaxis in Place: Yes    Patient Centered Rounds: I have performed bedside rounds with nursing staff today  Discussions with Specialists or Other Care Team Provider: APS and ID    Education and Discussions with Family / Patient: pt and mother    Time Spent for Care: 30 minutes  More than 50% of total time spent on counseling and coordination of care as described above      Current Length of Stay: 16 day(s)    Current Patient Status: Inpatient   Certification Statement: The patient will continue to require additional inpatient hospital stay due to need for IV abx    Discharge Plan: will need 6 weeks of IV abx    Code Status: Level 1 - Full Code      Subjective:   Severe R hip pain    Objective:     Vitals:   Temp (24hrs), Av 2 °F (36 8 °C), Min:98 1 °F (36 7 °C), Max:98 4 °F (36 9 °C)    Temp:  [98 1 °F (36 7 °C)-98 4 °F (36 9 °C)] 98 4 °F (36 9 °C)  HR:  [103-109] 107  Resp:  [18] 18  BP: (106-111)/(58-62) 110/60  SpO2:  [96 %-99 %] 96 %  Body mass index is 23 3 kg/m²  Input and Output Summary (last 24 hours): Intake/Output Summary (Last 24 hours) at 2020 1657  Last data filed at 2020 1000  Gross per 24 hour   Intake 970 ml   Output 0 ml   Net 970 ml       Physical Exam:     Physical Exam  Constitutional:       General: She is not in acute distress  HENT:      Head: Normocephalic and atraumatic  Nose: Nose normal       Mouth/Throat:      Mouth: Mucous membranes are moist    Eyes:      Extraocular Movements: Extraocular movements intact  Conjunctiva/sclera: Conjunctivae normal    Neck:      Musculoskeletal: Normal range of motion and neck supple  Cardiovascular:      Rate and Rhythm: Normal rate and regular rhythm  Pulmonary:      Effort: Pulmonary effort is normal       Breath sounds: Normal breath sounds  No wheezing or rales  Abdominal:      General: Bowel sounds are normal  There is no distension  Palpations: Abdomen is soft  Tenderness: There is no abdominal tenderness  Musculoskeletal: Normal range of motion  Right lower leg: No edema  Left lower leg: No edema  Skin:     General: Skin is warm and dry  Neurological:      General: No focal deficit present  Mental Status: She is alert and oriented to person, place, and time  Mental status is at baseline           Additional Data:     Labs:    Results from last 7 days   Lab Units 20  0618 20  0527   WBC Thousand/uL 5 10 5 86   HEMOGLOBIN g/dL 8 4* 9 1*   HEMATOCRIT % 27 5* 28 8*   PLATELETS Thousands/uL 355 446*   NEUTROS PCT %  --  44   LYMPHS PCT %  --  40   MONOS PCT %  --  8   EOS PCT %  --  6     Results from last 7 days   Lab Units 08/28/20  0618   POTASSIUM mmol/L 3 8   CHLORIDE mmol/L 105   CO2 mmol/L 29   BUN mg/dL 8   CREATININE mg/dL 0 57*   CALCIUM mg/dL 8 4   ALK PHOS U/L 161*   ALT U/L 19   AST U/L 40           * I Have Reviewed All Lab Data Listed Above  * Additional Pertinent Lab Tests Reviewed: Denice 68 Admission Reviewed        Recent Cultures (last 7 days):     Results from last 7 days   Lab Units 08/26/20  1615   BLOOD CULTURE  No Growth at 24 hrs         Last 24 Hours Medication List:   Current Facility-Administered Medications   Medication Dose Route Frequency Provider Last Rate    acetaminophen  650 mg Oral Q6H PRN Emily Carpenter, DO      calcium carbonate  1,000 mg Oral Daily PRN Paige Brooks MD      cefazolin  2,000 mg Intravenous Q8H Asha Roca PA-C 2,000 mg (08/28/20 1514)    DULoxetine  30 mg Oral Daily Mychal Reyna, DO      enoxaparin  1 mg/kg Subcutaneous Q12H [facility] Katelynn Church MD      gabapentin  200 mg Oral TID Mychal Reyna DO      iohexol  50 mL Oral 90 min pre-procedure Mychal Reyna,       lidocaine  2 patch Topical Daily Asha Roca PA-C      LORazepam  2 mg Intravenous Q3H PRN Katelynn Church MD      LORazepam  3 mg Intravenous Q30 Min PRN Emily Carpenter, DO      magnesium citrate  296 mL Oral Daily PRN Jovani Stephens MD      melatonin  6 mg Oral HS PRN Asha Roca PA-C      methocarbamol  750 mg Oral Q6H [facility] Katelynn Church MD      methylnaltrexone bromide  12 mg Subcutaneous Q48H PRN Katelynn Church MD      mirtazapine  30 mg Oral HS Paige Brooks MD      morphine injection  3 mg Intravenous Q3H PRN Mychal Reyna DO      OLANZapine  5 mg Oral HS Philomena Tellez MD      ondansetron  4 mg Intravenous Q6H PRN Paige Brooks MD      oxyCODONE  10 mg Oral Q4H PRN BOO Hernandez      oxyCODONE  15 mg Oral Q4H PRN BOO Hernandez      saccharomyces boulardii  250 mg Oral BID Juanita Genao MD      senna  1 tablet Oral BID Juanita Genao MD          Today, Patient Was Seen By: Fredy Ghosh DO    ** Please Note: Dictation voice to text software may have been used in the creation of this document   **

## 2020-08-28 NOTE — ASSESSMENT & PLAN NOTE
Increase lovenox to 60mg Q12h  Due to severe pain consulted with vascular surgery for treatment options - appreciated  Picc continues to function well and post recent blood culture is negative  Elevation of LUE  PICC team attempted to move picc line to right arm given left arm DVT pain  Unsuccessful   IR consulted   IR exchanged PICC 8/26

## 2020-08-28 NOTE — PROGRESS NOTES
Progress Note - Infectious Disease   Alessia Elena 34 y o  female MRN: [id number]  Unit/Bed#: [facility] 526-01 Encounter: [id number]      Impression/Plan:  1  Recurrent/persistent MSSA bacteremia with TV infective endocarditis   Blood cultures from 8/10 remain positive   Prolonged course of IV antibiotic was previously recommended, but patient has left AMA on 2 prior occasions (once at [address] [place] [address] and once from [facility])    She remains hemodynamically stable despite her ongoing bacteremia  Repeat blood cultures negative  PICC culture negative thus far    -continue cefazolin through 9/26/2020 to complete 6 weeks from the 1st negative blood culture  -follow up repeat blood cultures  -recheck CBC with diff and CMP  -follow-up PICC cath tip culture and change PICC site if culture positive     2  Tricuspid valve endocarditis, 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  7/21 MELISSA showed large vegetation on TV with severe regurgitation  7/15 CT abdomen/pelvis also showed bilateral renal parenchymal abnormalities concerning for septic emboli   No intra-abdominal abscess     -antibiotic plan as above  -CT surgery follow-up     3  Right iliacus muscle abscesses-very small and unlikely to be able to be drained   At the level of the mid SI joint but the SI joint appears okay   Now with increasing pain of unclear significance   Perhaps the infection is now spread into the SI joint although this is not overtly seen on CT scan   Her increased pain may be more related to the decreased morphine dose   However she continues to have severe pain  CT abdomen pelvis without new abscess    Unable to tolerate MRI with Ativan sedation  -antibiotics as above  -pain management  -check MRI of the right hip due to ongoing pain with anesthesia     4  Recent left foot cellulitis with abscess   Likely site of injection of IV drugs versus ectopic foci in the setting of MSSA bacteremia   This appears to have healed with no evidence of active infection   -antibiotic plan as above      5  Back pain   More likely secondary to chronic pain, opioid dependence   20 Thoracic and lumbar spine MRIs performed at [facility] [place] negative were for abscess or osteomyelitis  CT scan nondiagnostic for osteomyelitis or diskitis   -monitor back pain  -recheck sedimentation rate and CRP  -may need repeat MRI lumbar spine but will hold for now  -serial exams  -acute pain service follow-up     6  Active IV heroin abuse   This is the causative factor for development of bacteremia and infective endocarditis   Patient has left AMA on prior occasions   HIV screen negative  -patient is not a candidate for at home PICC line/IV antibiotics  -acute pain service follow-up     7  Chronic HCV infection, transaminitis   Recent HIV was negative   The LFTs are waxing waning  -monitor LFTs      8  Left upper extremity DVT-in the setting of PICC line use   Patient now on anticoagulation  -vascular follow-up  -serial exam  -anticoagulation    Discussed the above management plan with the primary service    See the patient again 2020  Please call if questions  Antibiotics:  Cefazolin restart 19  Negative blood cultures 13    Subjective:  Patient has no fever, chills, sweats; no nausea, vomiting, diarrhea; no cough, shortness of breath; still having right-sided leg pain  No new symptoms  Objective:  Vitals:  Temp:  [98 °F (36 7 °C)-98 1 °F (36 7 °C)] 98 1 °F (36 7 °C)  HR:  [102-109] 109  Resp:  [16-18] 18  BP: (106-111)/(58-69) 106/62  SpO2:  [96 %-99 %] 96 %  Temp (24hrs), Av 1 °F (36 7 °C), Min:98 °F (36 7 °C), Max:98 1 °F (36 7 °C)  Current: Temperature: 98 1 °F (36 7 °C)    Physical Exam:   General Appearance:  Somnolent, easily arousable, nontoxic, no acute distress  Throat: Oropharynx moist without lesions      Lungs:   Clear to auscultation bilaterally; no wheezes, rhonchi or rales; respirations unlabored   Heart:  Tachycardic; no murmur, rub or gallop Abdomen:   Soft, non-tender, non-distended, positive bowel sounds  Extremities: No clubbing, cyanosis or edema   Skin: No new rashes or lesions  No draining wounds noted  Labs, Imaging, & Other studies:   All pertinent labs and imaging studies were personally reviewed  Results from last 7 days   Lab Units 08/28/20  0618 08/25/20  0527 08/24/20  0453   WBC Thousand/uL 5 10 5 86 5 53   HEMOGLOBIN g/dL 8 4* 9 1* 10 2*   PLATELETS Thousands/uL 355 446* 425*     Results from last 7 days   Lab Units 08/28/20  0618 08/25/20  0527 08/24/20  0453   SODIUM mmol/L 138 138 139   POTASSIUM mmol/L 3 8 4 1 3 8   CHLORIDE mmol/L 105 102 103   CO2 mmol/L 29 30 30   BUN mg/dL 8 8 7   CREATININE mg/dL 0 57* 0 48* 0 49*   EGFR ml/min/1 73sq m 127 135 134   CALCIUM mg/dL 8 4 8 7 8 6   AST U/L 40  --  30   ALT U/L 19  --  26   ALK PHOS U/L 161*  --  160*     Results from last 7 days   Lab Units 08/26/20  1615   BLOOD CULTURE  No Growth at 24 hrs

## 2020-08-28 NOTE — QUICK NOTE
Patient agreeable today to MRI with increased dose of ativan given prior  Received 3mg IV ativan and now refusing to go to MRI because she is not sedated  Says she not tolerate prior MRI at [facility], will need anesthesia for MRI

## 2020-08-29 LAB
BACTERIA CATH TIP CULT: NO GROWTH
BACTERIA CATH TIP CULT: NORMAL

## 2020-08-29 PROCEDURE — 99232 SBSQ HOSP IP/OBS MODERATE 35: CPT | Performed by: GENERAL PRACTICE

## 2020-08-29 RX ORDER — DIAPER,BRIEF,INFANT-TODD,DISP
EACH MISCELLANEOUS 4 TIMES DAILY PRN
Status: DISCONTINUED | OUTPATIENT
Start: 2020-08-29 | End: 2020-09-13

## 2020-08-29 RX ORDER — NYSTATIN 100000 U/G
CREAM TOPICAL 2 TIMES DAILY
Status: DISCONTINUED | OUTPATIENT
Start: 2020-08-29 | End: 2020-09-13

## 2020-08-29 RX ADMIN — MORPHINE SULFATE 3 MG: 4 INJECTION INTRAVENOUS at 08:53

## 2020-08-29 RX ADMIN — DULOXETINE HYDROCHLORIDE 30 MG: 30 CAPSULE, DELAYED RELEASE ORAL at 08:48

## 2020-08-29 RX ADMIN — Medication 250 MG: at 08:48

## 2020-08-29 RX ADMIN — OXYCODONE HYDROCHLORIDE 15 MG: 10 TABLET ORAL at 19:39

## 2020-08-29 RX ADMIN — MORPHINE SULFATE 3 MG: 4 INJECTION INTRAVENOUS at 15:14

## 2020-08-29 RX ADMIN — LORAZEPAM 2 MG: 2 INJECTION INTRAMUSCULAR; INTRAVENOUS at 00:36

## 2020-08-29 RX ADMIN — GABAPENTIN 200 MG: 100 CAPSULE ORAL at 08:48

## 2020-08-29 RX ADMIN — MELATONIN 6 MG: at 23:45

## 2020-08-29 RX ADMIN — OLANZAPINE 5 MG: 5 TABLET, FILM COATED ORAL at 21:08

## 2020-08-29 RX ADMIN — METHOCARBAMOL TABLETS 750 MG: 750 TABLET, COATED ORAL at 05:08

## 2020-08-29 RX ADMIN — LORAZEPAM 2 MG: 2 INJECTION INTRAMUSCULAR; INTRAVENOUS at 09:25

## 2020-08-29 RX ADMIN — ALTEPLASE 2 MG: 2.2 INJECTION, POWDER, LYOPHILIZED, FOR SOLUTION INTRAVENOUS at 14:24

## 2020-08-29 RX ADMIN — NYSTATIN: 100000 CREAM TOPICAL at 17:08

## 2020-08-29 RX ADMIN — ALTEPLASE 2 MG: 2.2 INJECTION, POWDER, LYOPHILIZED, FOR SOLUTION INTRAVENOUS at 15:22

## 2020-08-29 RX ADMIN — ENOXAPARIN SODIUM 60 MG: 60 INJECTION SUBCUTANEOUS at 21:08

## 2020-08-29 RX ADMIN — Medication 250 MG: at 17:08

## 2020-08-29 RX ADMIN — OXYCODONE HYDROCHLORIDE 15 MG: 10 TABLET ORAL at 23:44

## 2020-08-29 RX ADMIN — ENOXAPARIN SODIUM 60 MG: 60 INJECTION SUBCUTANEOUS at 08:48

## 2020-08-29 RX ADMIN — OXYCODONE HYDROCHLORIDE 15 MG: 10 TABLET ORAL at 03:10

## 2020-08-29 RX ADMIN — MORPHINE SULFATE 3 MG: 4 INJECTION INTRAVENOUS at 05:08

## 2020-08-29 RX ADMIN — METHOCARBAMOL TABLETS 750 MG: 750 TABLET, COATED ORAL at 17:08

## 2020-08-29 RX ADMIN — LORAZEPAM 2 MG: 2 INJECTION INTRAMUSCULAR; INTRAVENOUS at 03:37

## 2020-08-29 RX ADMIN — LORAZEPAM 2 MG: 2 INJECTION INTRAMUSCULAR; INTRAVENOUS at 17:17

## 2020-08-29 RX ADMIN — OXYCODONE HYDROCHLORIDE 15 MG: 10 TABLET ORAL at 14:23

## 2020-08-29 RX ADMIN — LORAZEPAM 2 MG: 2 INJECTION INTRAMUSCULAR; INTRAVENOUS at 21:02

## 2020-08-29 RX ADMIN — METHOCARBAMOL TABLETS 750 MG: 750 TABLET, COATED ORAL at 23:45

## 2020-08-29 RX ADMIN — MIRTAZAPINE 30 MG: 30 TABLET, FILM COATED ORAL at 21:08

## 2020-08-29 RX ADMIN — MORPHINE SULFATE 3 MG: 4 INJECTION INTRAVENOUS at 20:18

## 2020-08-29 RX ADMIN — CEFAZOLIN SODIUM 2000 MG: 2 SOLUTION INTRAVENOUS at 15:18

## 2020-08-29 RX ADMIN — CEFAZOLIN SODIUM 2000 MG: 2 SOLUTION INTRAVENOUS at 23:45

## 2020-08-29 RX ADMIN — HYDROCORTISONE: 1 CREAM TOPICAL at 17:08

## 2020-08-29 RX ADMIN — GABAPENTIN 200 MG: 100 CAPSULE ORAL at 21:08

## 2020-08-29 RX ADMIN — CEFAZOLIN SODIUM 2000 MG: 2 SOLUTION INTRAVENOUS at 07:37

## 2020-08-29 RX ADMIN — GABAPENTIN 200 MG: 100 CAPSULE ORAL at 15:18

## 2020-08-29 RX ADMIN — OXYCODONE HYDROCHLORIDE 15 MG: 10 TABLET ORAL at 07:35

## 2020-08-29 RX ADMIN — LORAZEPAM 2 MG: 2 INJECTION INTRAMUSCULAR; INTRAVENOUS at 23:45

## 2020-08-29 NOTE — ASSESSMENT & PLAN NOTE
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

## 2020-08-29 NOTE — ASSESSMENT & PLAN NOTE
· Patient noted to have tricuspid while vegetation on echocardiogram done in July  Patient had a transthoracic and also transesophageal echocardiogram done during the last hospital stay    · With septic emboli to lungs and posterior iliacus muscle  · Patient is transferred over here to be evaluated by CT surgery  · She was noncommittal to abstain from illegal drugs or unintended use of medications  · When she continues to abstain and completes iv abx treatment they would consider surgical correction - this will likely be as OP   · Continue IV cefazolin as above  · D/c tele

## 2020-08-29 NOTE — PLAN OF CARE
Problem: Prexisting or High Potential for Compromised Skin Integrity  Goal: Skin integrity is maintained or improved  Description: INTERVENTIONS:  - Identify patients at risk for skin breakdown  - Assess and monitor skin integrity  - Assess and monitor nutrition and hydration status  - Monitor labs   - Assess for incontinence   - Turn and reposition patient  - Assist with mobility/ambulation  - Relieve pressure over bony prominences  - Avoid friction and shearing  - Provide appropriate hygiene as needed including keeping skin clean and dry  - Evaluate need for skin moisturizer/barrier cream  - Collaborate with interdisciplinary team   - Patient/family teaching  - Consider wound care consult   Outcome: Progressing     Problem: PAIN - ADULT  Goal: Verbalizes/displays adequate comfort level or baseline comfort level  Description: Interventions:  - Encourage patient to monitor pain and request assistance  - Assess pain using appropriate pain scale  - Administer analgesics based on type and severity of pain and evaluate response  - Implement non-pharmacological measures as appropriate and evaluate response  - Consider cultural and social influences on pain and pain management  - Notify physician/advanced practitioner if interventions unsuccessful or patient reports new pain  Outcome: Progressing     Problem: INFECTION - ADULT  Goal: Absence or prevention of progression during hospitalization  Description: INTERVENTIONS:  - Assess and monitor for signs and symptoms of infection  - Monitor lab/diagnostic results  - Monitor all insertion sites, i e  indwelling lines, tubes, and drains  - Monitor endotracheal if appropriate and nasal secretions for changes in amount and color  - Arnold appropriate cooling/warming therapies per order  - Administer medications as ordered  - Instruct and encourage patient and family to use good hand hygiene technique  - Identify and instruct in appropriate isolation precautions for identified infection/condition  Outcome: Progressing  Goal: Absence of fever/infection during neutropenic period  Description: INTERVENTIONS:  - Monitor WBC    Outcome: Progressing     Problem: SAFETY ADULT  Goal: Patient will remain free of falls  Description: INTERVENTIONS:  - Assess patient frequently for physical needs  -  Identify cognitive and physical deficits and behaviors that affect risk of falls    -  [place] fall precautions as indicated by assessment   - Educate patient/family on patient safety including physical limitations  - Instruct patient to call for assistance with activity based on assessment  - Modify environment to reduce risk of injury  - Consider OT/PT consult to assist with strengthening/mobility  Outcome: Progressing  Goal: Maintain or return to baseline ADL function  Description: INTERVENTIONS:  -  Assess patient's ability to carry out ADLs; assess patient's baseline for ADL function and identify physical deficits which impact ability to perform ADLs (bathing, care of mouth/teeth, toileting, grooming, dressing, etc )  - Assess/evaluate cause of self-care deficits   - Assess range of motion  - Assess patient's mobility; develop plan if impaired  - Assess patient's need for assistive devices and provide as appropriate  - Encourage maximum independence but intervene and supervise when necessary  - Involve family in performance of ADLs  - Assess for home care needs following discharge   - Consider OT consult to assist with ADL evaluation and planning for discharge  - Provide patient education as appropriate  Outcome: Progressing  Goal: Maintain or return mobility status to optimal level  Description: INTERVENTIONS:  - Assess patient's baseline mobility status (ambulation, transfers, stairs, etc )    - Identify cognitive and physical deficits and behaviors that affect mobility  - Identify mobility aids required to assist with transfers and/or ambulation (gait belt, sit-to-stand, lift, walker, cane, etc )  - [place] fall precautions as indicated by assessment  - Record patient progress and toleration of activity level on Mobility SBAR; progress patient to next Phase/Stage  - Instruct patient to call for assistance with activity based on assessment  - Consider rehabilitation consult to assist with strengthening/weightbearing, etc   Outcome: Progressing     Problem: CARDIOVASCULAR - ADULT  Goal: Maintains optimal cardiac output and hemodynamic stability  Description: INTERVENTIONS:  - Monitor I/O, vital signs and rhythm  - Monitor for S/S and trends of decreased cardiac output  - Administer and titrate ordered vasoactive medications to optimize hemodynamic stability  - Assess quality of pulses, skin color and temperature  - Assess for signs of decreased coronary artery perfusion  - Instruct patient to report change in severity of symptoms  Outcome: Progressing  Goal: Absence of cardiac dysrhythmias or at baseline rhythm  Description: INTERVENTIONS:  - Continuous cardiac monitoring, vital signs, obtain 12 lead EKG if ordered  - Administer antiarrhythmic and heart rate control medications as ordered  - Monitor electrolytes and administer replacement therapy as ordered  Outcome: Progressing     Problem: METABOLIC, FLUID AND ELECTROLYTES - ADULT  Goal: Electrolytes maintained within normal limits  Description: INTERVENTIONS:  - Monitor labs and assess patient for signs and symptoms of electrolyte imbalances  - Administer electrolyte replacement as ordered  - Monitor response to electrolyte replacements, including repeat lab results as appropriate  - Instruct patient on fluid and nutrition as appropriate  Outcome: Progressing  Goal: Fluid balance maintained  Description: INTERVENTIONS:  - Monitor labs   - Monitor I/O and WT  - Instruct patient on fluid and nutrition as appropriate  - Assess for signs & symptoms of volume excess or deficit  Outcome: Progressing  Goal: Glucose maintained within target range  Description: INTERVENTIONS:  - Monitor Blood Glucose as ordered  - Assess for signs and symptoms of hyperglycemia and hypoglycemia  - Administer ordered medications to maintain glucose within target range  - Assess nutritional intake and initiate nutrition service referral as needed  Outcome: Progressing     Problem: HEMATOLOGIC - ADULT  Goal: Maintains hematologic stability  Description: INTERVENTIONS  - Assess for signs and symptoms of bleeding or hemorrhage  - Monitor labs  - Administer supportive blood products/factors as ordered and appropriate  Outcome: Progressing     Problem: Potential for Falls  Goal: Patient will remain free of falls  Description: INTERVENTIONS:  - Assess patient frequently for physical needs  -  Identify cognitive and physical deficits and behaviors that affect risk of falls  -  [place] fall precautions as indicated by assessment   - Educate patient/family on patient safety including physical limitations  - Instruct patient to call for assistance with activity based on assessment  - Modify environment to reduce risk of injury  - Consider OT/PT consult to assist with strengthening/mobility  Outcome: Progressing     Problem: Nutrition/Hydration-ADULT  Goal: Nutrient/Hydration intake appropriate for improving, restoring or maintaining nutritional needs  Description: Monitor and assess patient's nutrition/hydration status for malnutrition  Collaborate with interdisciplinary team and initiate plan and interventions as ordered  Monitor patient's weight and dietary intake as ordered or per policy  Utilize nutrition screening tool and intervene as necessary  Determine patient's food preferences and provide high-protein, high-caloric foods as appropriate       INTERVENTIONS:  - Monitor oral intake, urinary output, labs, and treatment plans  - Assess nutrition and hydration status and recommend course of action  - Evaluate amount of meals eaten  - Assist patient with eating if necessary - Allow adequate time for meals  - Recommend/ encourage appropriate diets, oral nutritional supplements, and vitamin/mineral supplements  - Order, calculate, and assess calorie counts as needed  - Recommend, monitor, and adjust tube feedings and TPN/PPN based on assessed needs  - Assess need for intravenous fluids  - Provide specific nutrition/hydration education as appropriate  - Include patient/family/caregiver in decisions related to nutrition  Outcome: Progressing     Problem: Knowledge Deficit  Goal: Patient/family/caregiver demonstrates understanding of disease process, treatment plan, medications, and discharge instructions  Description: Complete learning assessment and assess knowledge base    Interventions:  - Provide teaching at level of understanding  - Provide teaching via preferred learning methods  Outcome: Progressing

## 2020-08-29 NOTE — ASSESSMENT & PLAN NOTE
· Patient with history of drug abuse and likely the source of bacteremia  · Patient has a history of signing against medical advise  · May benefit from drug rehab eventually if she becomes agreeable to host services  She was not interested during my conversation  · During the hospital stay in July of this year patient was positive for [facility] ,cocaine and opiates  Although pt reported to Dr Natalie Ram that she had not used any drugs for several years now

## 2020-08-29 NOTE — ASSESSMENT & PLAN NOTE
· Patient was complaining of severe back pain mainly in the lower part of the back and also in the sacral region  · Patient had MRI of the lumbar and thoracic spine during the recent hospital stay in [facility] which was unremarkable  · - continue Cymbalta 30 mg daily - Plan to increase to 60 mg daily on 9/1  · - continue robaxin to 750 mg q 6  · - continue oxy to 10 mg and 15 mg   · - continue iv morphine 4 mg to q3 hrs prn for breakthrough  · -continue tylenol atc pt can refuse (she states it makes her break out in profuse sweats)   · - continue lidocaine patch  · -continue gabapentin to 200mg TID - I offered increase in gabapentin but pt refused as she says it did not help in past and had w/drawal when she stopped it abruptly  · Iliacus muscle abscess is noted on CT scan, continue antibiotic treatment as above, no indication for drainage at this time  · Repeat CT scan shows improvement in septic emboli to iliacus muscle  · ketamine infusion discontinued  · Patient has been reluctant with interventions and procedures  She cites that she wants IV pain meds before intervention  Extensive discussion regarding acute pain service recommendations  Will proceed with following recommendations by acute pain service  · Pt unable to tolerate MRI w/ 3 mg IV Ativan    Will order 3 mg IV Ativan 30 min before MRI and then plan to give additional 3 mg at MRI if needed  · Hold off on thoracolumbar MRI as pain in right hip and prior MRI unremarkable  · Opioid, benzo, and Neurontin taper scanned into media  · Plan to decrease Neurontin to 100 mg tid and and morphine to 2 mg q3h prn 8/31

## 2020-08-29 NOTE — PROGRESS NOTES
Progress Note - Gleda Ahumada 1990, 34 y o  female MRN: [id number]    Unit/Bed#: [facility] [place] 526-01 Encounter: [id number]    Primary Care Provider: Linsey Loya PA-C   Date and time admitted to hospital: 8/12/2020  7:39 PM        * Bacteremia due to Staphylococcus aureus  Assessment & Plan  · Recurrent MSSA bacteremia  · Initially admitted to [facility] from 07/15-blood culture were positive for MSSA bacteremia, but patient signed AMA on 07/22  Her repeat blood culture done on 7/21 was negative after 5 days  · [facility] on 07/30-blood culture came back positive for MSSA and she left again is medical advice on 08/04  Her blood cultures came back positive on 07/30, but the bacteremia cleared as of 8 /2  Patient left AMA on 08/04  · Readmitted to [address] [place] [address] on 08/10-blood culture came back positive for Staph aureus  · 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 during the last hospital stay from 7/15-7/22  · Plan is to continue IV cefazolin for a 6 week course per ID recommendations from 8/15 when B Cx became neg  · 8/26 removed PICC and PICC cx neg  · B Cx from 8/26 neg    DVT of axillary vein, acute left (HCC)  Assessment & Plan  Increase lovenox to 60mg Q12h  Due to severe pain consulted with vascular surgery for treatment options - appreciated  Picc continues to function well and post recent blood culture is negative  Elevation of LUE  PICC team attempted to move picc line to right arm given left arm DVT pain  Unsuccessful  IR consulted   IR exchanged PICC 8/26    Intravenous drug abuse [facility])  Assessment & Plan  · Patient with history of drug abuse and likely the source of bacteremia  · Patient has a history of signing against medical advise  · May benefit from drug rehab eventually if she becomes agreeable to host services  She was not interested during my conversation    · During the hospital stay in July of this year patient was positive for THC ,cocaine and opiates  Although pt reported to Dr Armendariz that she had not used any drugs for several years now  Right hip pain  Assessment & Plan  · Patient was complaining of severe back pain mainly in the lower part of the back and also in the sacral region  · Patient had MRI of the lumbar and thoracic spine during the recent hospital stay in [facility] which was unremarkable  · - continue Cymbalta 30 mg daily - Plan to increase to 60 mg daily on 9/1  · - continue robaxin to 750 mg q 6  · - continue oxy to 10 mg and 15 mg   · - continue iv morphine 4 mg to q3 hrs prn for breakthrough  · -continue tylenol atc pt can refuse (she states it makes her break out in profuse sweats)   · - continue lidocaine patch  · -continue gabapentin to 200mg TID - I offered increase in gabapentin but pt refused as she says it did not help in past and had w/drawal when she stopped it abruptly  · Iliacus muscle abscess is noted on CT scan, continue antibiotic treatment as above, no indication for drainage at this time  · Repeat CT scan shows improvement in septic emboli to iliacus muscle  · ketamine infusion discontinued  · Patient has been reluctant with interventions and procedures  She cites that she wants IV pain meds before intervention  Extensive discussion regarding acute pain service recommendations  Will proceed with following recommendations by acute pain service  · Pt unable to tolerate MRI w/ 3 mg IV Ativan    Will order 3 mg IV Ativan 30 min before MRI and then plan to give additional 3 mg at MRI if needed  · Hold off on thoracolumbar MRI as pain in right hip and prior MRI unremarkable  · Opioid, benzo, and Neurontin taper scanned into media  · Plan to decrease Neurontin to 100 mg tid and and morphine to 2 mg q3h prn 8/31    Bipolar 1 disorder ([facility] [address] )  Assessment & Plan  · Patient 8/27 agreed to psych eval which is apprecaited  · Psych added Zyprexa  · C/w Cymbalta and Remeron    Septic embolism ([facility] [address] )  Assessment & Plan  · Patient noted to have abnormalities seen in the CT of the chest abdomen and pelvis concerning for septic emboli  · There is pleural effusion on the CT scan of the chest and also on repeat chest x-ray  · Patient is rather asymptomatic from pulmonary standpoint  · Continue with the antibiotics  · Thoracic surgery have evaluated the pt and she is not short of breath   · 8/15-imaging right hip shows no osseous involvement; concern for proximity of abscess status SI joint, but no SI joint involvement at this time  · Management as above under hip pain    Acute bacterial endocarditis  Assessment & Plan  · Patient noted to have tricuspid while vegetation on echocardiogram done in July  Patient had a transthoracic and also transesophageal echocardiogram done during the last hospital stay  · With septic emboli to lungs and posterior iliacus muscle  · Patient is transferred over here to be evaluated by CT surgery  · She was noncommittal to abstain from illegal drugs or unintended use of medications  · When she continues to abstain and completes iv abx treatment they would consider surgical correction - this will likely be as OP   · Continue IV cefazolin as above  · D/c tele    VTE Pharmacologic Prophylaxis:   Pharmacologic:Therapetuic Enoxaparin (Lovenox)  Mechanical VTE Prophylaxis in Place: Yes    Patient Centered Rounds: I have performed bedside rounds with nursing staff today  Discussions with Specialists or Other Care Team Provider: no    Education and Discussions with Family / Patient: pt    Time Spent for Care: 30 minutes  More than 50% of total time spent on counseling and coordination of care as described above      Current Length of Stay: 17 day(s)    Current Patient Status: Inpatient   Certification Statement: The patient will continue to require additional inpatient hospital stay due to need for IV abx    Discharge Plan: when iv abx complete    Code Status: Level 1 - Full Code      Subjective:   Slept this AM   Notes rash in anal region    Objective:     Vitals:   Temp (24hrs), Av 9 °F (36 6 °C), Min:97 7 °F (36 5 °C), Max:98 1 °F (36 7 °C)    Temp:  [97 7 °F (36 5 °C)-98 1 °F (36 7 °C)] 98 1 °F (36 7 °C)  HR:  [108-110] 110  Resp:  [16-18] 18  BP: (104-122)/(62-68) 104/62  SpO2:  [96 %-97 %] 97 %  Body mass index is 23 3 kg/m²  Input and Output Summary (last 24 hours): Intake/Output Summary (Last 24 hours) at 2020 1539  Last data filed at 2020 0837  Gross per 24 hour   Intake 960 ml   Output    Net 960 ml       Physical Exam:     Physical Exam  HENT:      Head: Normocephalic and atraumatic  Nose: Nose normal       Mouth/Throat:      Mouth: Mucous membranes are moist    Eyes:      Extraocular Movements: Extraocular movements intact  Conjunctiva/sclera: Conjunctivae normal    Neck:      Musculoskeletal: Normal range of motion and neck supple  Cardiovascular:      Rate and Rhythm: Normal rate and regular rhythm  Pulmonary:      Effort: Pulmonary effort is normal       Breath sounds: Normal breath sounds  No wheezing or rales  Abdominal:      General: Bowel sounds are normal  There is no distension  Palpations: Abdomen is soft  Tenderness: There is no abdominal tenderness  Musculoskeletal: Normal range of motion  Right lower leg: No edema  Left lower leg: No edema  Skin:     General: Skin is warm and dry  Neurological:      Mental Status: She is alert and oriented to person, place, and time  Mental status is at baseline           Additional Data:     Labs:    Results from last 7 days   Lab Units 20  0618 20  0527   WBC Thousand/uL 5 10 5 86   HEMOGLOBIN g/dL 8 4* 9 1*   HEMATOCRIT % 27 5* 28 8*   PLATELETS Thousands/uL 355 446*   NEUTROS PCT %  --  44   LYMPHS PCT %  --  40   MONOS PCT %  --  8   EOS PCT %  --  6     Results from last 7 days   Lab Units 20  0618   POTASSIUM mmol/L 3  8   CHLORIDE mmol/L 105   CO2 mmol/L 29   BUN mg/dL 8   CREATININE mg/dL 0 57*   CALCIUM mg/dL 8 4   ALK PHOS U/L 161*   ALT U/L 19   AST U/L 40           * I Have Reviewed All Lab Data Listed Above  * Additional Pertinent Lab Tests Reviewed: Denice Barton Admission Reviewed        Recent Cultures (last 7 days):     Results from last 7 days   Lab Units 08/26/20  1615   BLOOD CULTURE  No Growth at 48 hrs         Last 24 Hours Medication List:   Current Facility-Administered Medications   Medication Dose Route Frequency Provider Last Rate    acetaminophen  650 mg Oral Q6H PRN Louisa Rigoberto, DO      alteplase  2 mg Intracatheter Q12H PRN Louisa Rigoberto, DO      calcium carbonate  1,000 mg Oral Daily PRN Hiral Nix MD      cefazolin  2,000 mg Intravenous Q8H Asha Roca PA-C 2,000 mg (08/29/20 1518)    DULoxetine  30 mg Oral Daily Hetul Reyna, DO      enoxaparin  1 mg/kg Subcutaneous Q12H [facility] Nasir Hdz MD      gabapentin  200 mg Oral TID Hetul Reyna, DO      hydrocortisone   Topical 4x Daily PRN Talia Franklin, DO      iohexol  50 mL Oral 90 min pre-procedure Hetul Reyna, DO      lidocaine  2 patch Topical Daily Asha Roca PA-C      LORazepam  2 mg Intravenous Q3H PRN Nasir Hdz MD      LORazepam  3 mg Intravenous Q30 Min PRN Louisa Rigoberto, DO      magnesium citrate  296 mL Oral Daily PRN Cirilo Brooks MD      melatonin  6 mg Oral HS PRN Asha Roca PA-C      methocarbamol  750 mg Oral Q6H [facility] Nasir Hdz MD      methylnaltrexone bromide  12 mg Subcutaneous Q48H PRN Nasir Hdz MD      mirtazapine  30 mg Oral HS Hiral Nix MD      morphine injection  3 mg Intravenous Q3H PRN Hetul Reyna, DO      nystatin   Topical BID Louisa Rigoberto, DO      OLANZapine  5 mg Oral HS Dave Birmingham MD      ondansetron  4 mg Intravenous Q6H PRN Hiral Nix MD      oxyCODONE  10 mg Oral Q4H PRN BOO Cole      oxyCODONE  15 mg Oral Q4H PRN Jaxon Levin Garza, CRNP      saccharomyces boulardii  250 mg Oral BID MD Tiffanie Martinezna  1 tablet Oral BID Kena Harrison MD          Today, Patient Was Seen By: Cj Davidson DO    ** Please Note: Dictation voice to text software may have been used in the creation of this document   **

## 2020-08-30 ENCOUNTER — APPOINTMENT (INPATIENT)
Dept: RADIOLOGY | Facility: HOSPITAL | Age: 30
DRG: 163 | End: 2020-08-30
Payer: COMMERCIAL

## 2020-08-30 PROCEDURE — A9585 GADOBUTROL INJECTION: HCPCS | Performed by: GENERAL PRACTICE

## 2020-08-30 PROCEDURE — 73723 MRI JOINT LWR EXTR W/O&W/DYE: CPT

## 2020-08-30 PROCEDURE — G1004 CDSM NDSC: HCPCS

## 2020-08-30 PROCEDURE — 99233 SBSQ HOSP IP/OBS HIGH 50: CPT | Performed by: GENERAL PRACTICE

## 2020-08-30 RX ORDER — GABAPENTIN 100 MG/1
100 CAPSULE ORAL
Status: DISCONTINUED | OUTPATIENT
Start: 2020-09-09 | End: 2020-09-09

## 2020-08-30 RX ORDER — DIPHENHYDRAMINE HCL 25 MG
25 TABLET ORAL ONCE AS NEEDED
Status: COMPLETED | OUTPATIENT
Start: 2020-08-29 | End: 2020-08-30

## 2020-08-30 RX ORDER — GABAPENTIN 100 MG/1
100 CAPSULE ORAL 3 TIMES DAILY
Status: DISPENSED | OUTPATIENT
Start: 2020-08-31 | End: 2020-09-04

## 2020-08-30 RX ORDER — GABAPENTIN 100 MG/1
100 CAPSULE ORAL 2 TIMES DAILY
Status: COMPLETED | OUTPATIENT
Start: 2020-09-04 | End: 2020-09-08

## 2020-08-30 RX ADMIN — LORAZEPAM 2 MG: 2 INJECTION INTRAMUSCULAR; INTRAVENOUS at 02:45

## 2020-08-30 RX ADMIN — GABAPENTIN 200 MG: 100 CAPSULE ORAL at 16:47

## 2020-08-30 RX ADMIN — LORAZEPAM 2 MG: 2 INJECTION INTRAMUSCULAR; INTRAVENOUS at 21:15

## 2020-08-30 RX ADMIN — GABAPENTIN 200 MG: 100 CAPSULE ORAL at 21:15

## 2020-08-30 RX ADMIN — NYSTATIN: 100000 CREAM TOPICAL at 17:00

## 2020-08-30 RX ADMIN — CEFAZOLIN SODIUM 2000 MG: 2 SOLUTION INTRAVENOUS at 23:56

## 2020-08-30 RX ADMIN — MORPHINE SULFATE 3 MG: 4 INJECTION INTRAVENOUS at 17:52

## 2020-08-30 RX ADMIN — OLANZAPINE 5 MG: 5 TABLET, FILM COATED ORAL at 21:15

## 2020-08-30 RX ADMIN — ENOXAPARIN SODIUM 60 MG: 60 INJECTION SUBCUTANEOUS at 09:22

## 2020-08-30 RX ADMIN — CEFAZOLIN SODIUM 2000 MG: 2 SOLUTION INTRAVENOUS at 16:47

## 2020-08-30 RX ADMIN — Medication 250 MG: at 17:01

## 2020-08-30 RX ADMIN — LORAZEPAM 2 MG: 2 INJECTION INTRAMUSCULAR; INTRAVENOUS at 16:54

## 2020-08-30 RX ADMIN — MORPHINE SULFATE 3 MG: 4 INJECTION INTRAVENOUS at 22:48

## 2020-08-30 RX ADMIN — MORPHINE SULFATE 3 MG: 4 INJECTION INTRAVENOUS at 12:09

## 2020-08-30 RX ADMIN — OXYCODONE HYDROCHLORIDE 15 MG: 10 TABLET ORAL at 09:14

## 2020-08-30 RX ADMIN — MIRTAZAPINE 30 MG: 30 TABLET, FILM COATED ORAL at 21:15

## 2020-08-30 RX ADMIN — MORPHINE SULFATE 3 MG: 4 INJECTION INTRAVENOUS at 00:32

## 2020-08-30 RX ADMIN — LORAZEPAM 2 MG: 2 INJECTION INTRAMUSCULAR; INTRAVENOUS at 09:14

## 2020-08-30 RX ADMIN — DULOXETINE HYDROCHLORIDE 30 MG: 30 CAPSULE, DELAYED RELEASE ORAL at 09:22

## 2020-08-30 RX ADMIN — Medication 250 MG: at 09:14

## 2020-08-30 RX ADMIN — ENOXAPARIN SODIUM 60 MG: 60 INJECTION SUBCUTANEOUS at 21:15

## 2020-08-30 RX ADMIN — CEFAZOLIN SODIUM 2000 MG: 2 SOLUTION INTRAVENOUS at 09:15

## 2020-08-30 RX ADMIN — LORAZEPAM 3 MG: 2 INJECTION INTRAMUSCULAR; INTRAVENOUS at 10:43

## 2020-08-30 RX ADMIN — OXYCODONE HYDROCHLORIDE 15 MG: 10 TABLET ORAL at 16:47

## 2020-08-30 RX ADMIN — MORPHINE SULFATE 3 MG: 4 INJECTION INTRAVENOUS at 05:20

## 2020-08-30 RX ADMIN — OXYCODONE HYDROCHLORIDE 15 MG: 10 TABLET ORAL at 04:07

## 2020-08-30 RX ADMIN — GADOBUTROL 7 ML: 604.72 INJECTION INTRAVENOUS at 11:38

## 2020-08-30 RX ADMIN — NYSTATIN: 100000 CREAM TOPICAL at 09:36

## 2020-08-30 RX ADMIN — OXYCODONE HYDROCHLORIDE 15 MG: 10 TABLET ORAL at 20:52

## 2020-08-30 RX ADMIN — GABAPENTIN 200 MG: 100 CAPSULE ORAL at 09:14

## 2020-08-30 RX ADMIN — MELATONIN 6 MG: at 21:15

## 2020-08-30 RX ADMIN — DIPHENHYDRAMINE HCL 25 MG: 25 TABLET ORAL at 00:35

## 2020-08-30 RX ADMIN — METHOCARBAMOL TABLETS 750 MG: 750 TABLET, COATED ORAL at 05:06

## 2020-08-30 RX ADMIN — LORAZEPAM 2 MG: 2 INJECTION INTRAMUSCULAR; INTRAVENOUS at 05:46

## 2020-08-30 RX ADMIN — LORAZEPAM 3 MG: 2 INJECTION INTRAMUSCULAR; INTRAVENOUS at 11:15

## 2020-08-30 RX ADMIN — METHOCARBAMOL TABLETS 750 MG: 750 TABLET, COATED ORAL at 17:00

## 2020-08-30 RX ADMIN — METHOCARBAMOL TABLETS 750 MG: 750 TABLET, COATED ORAL at 23:56

## 2020-08-30 NOTE — PROGRESS NOTES
Progress Note - Evonne Camarillo 1990, 34 y o  female MRN: [id number]    Unit/Bed#: [facility] 526-01 Encounter: [id number]    Primary Care Provider: Brandi Solorio PA-C   Date and time admitted to hospital: 8/12/2020  7:39 PM        * Bacteremia due to Staphylococcus aureus  Assessment & Plan  · Recurrent MSSA bacteremia  · Initially admitted to [facility] from 07/15-blood culture were positive for MSSA bacteremia, but patient signed AMA on 07/22  Her repeat blood culture done on 7/21 was negative after 5 days  · [facility] on 07/30-blood culture came back positive for MSSA and she left again is medical advice on 08/04  Her blood cultures came back positive on 07/30, but the bacteremia cleared as of 8 /2  Patient left AMA on 08/04  · Readmitted to [address] [place] [address] on 08/10-blood culture came back positive for Staph aureus  · 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 during the last hospital stay from 7/15-7/22  · Plan is to continue IV cefazolin for a 6 week course per ID recommendations from 8/15 when B Cx became neg  · 8/26 removed PICC and PICC cx neg  · B Cx from 8/26 neg    DVT of axillary vein, acute left (HCC)  Assessment & Plan  Increase lovenox to 60mg Q12h  Due to severe pain consulted with vascular surgery for treatment options - appreciated  Picc continues to function well and post recent blood culture is negative  Elevation of LUE  PICC team attempted to move picc line to right arm given left arm DVT pain  Unsuccessful  IR consulted   IR exchanged PICC 8/26  Needs 3 months of AC - can d/c on NOAC if affordable    Intravenous drug abuse [facility])  Assessment & Plan  · Patient with history of drug abuse and likely the source of bacteremia  · Patient has a history of signing against medical advise  · May benefit from drug rehab eventually if she becomes agreeable to host services    She was not interested during my conversation  · During the hospital stay in July of this year patient was positive for [facility] ,cocaine and opiates  Although pt reported to Dr Sergio Horn that she had not used any drugs for several years now  Right hip pain  Assessment & Plan  · Patient was complaining of severe back pain mainly in the lower part of the back and also in the sacral region  · Patient had MRI of the lumbar and thoracic spine during the recent hospital stay in [facility] which was unremarkable  · - continue Cymbalta 30 mg daily - Plan to increase to 60 mg daily on 9/1  · - continue robaxin to 750 mg q 6  · - continue oxy 10 mg and 15 mg  prn  · - continue iv morphine 4 mg to q3 hrs prn for breakthrough  · -continue tylenol prn - refused ATC  · - continue lidocaine patch if pt does not refuse  · -continue gabapentin to 200mg TID - I offered increase in gabapentin but pt refused as she says it did not help in past and had w/drawal when she stopped it abruptly  · Iliacus muscle abscess is noted on CT scan, continue antibiotic treatment as above, no indication for drainage at this time  · Repeat CT scan shows improvement in septic emboli to iliacus muscle  · ketamine infusion discontinued  · Patient has been reluctant with interventions and procedures  She cites that she wants IV pain meds before intervention  Extensive discussion regarding acute pain service recommendations  Will proceed with following recommendations by acute pain service    · MRI completed today - read pending  · Hold off on thoracolumbar MRI as pain in right hip and prior MRI unremarkable  · Opioid, benzo, and Neurontin taper scanned into media  · Decrease Neurontin to 100 mg tid and and morphine to 2 mg q3h prn 8/31  · Neurontin taper already ordered  · Will have to follow taper as pt cannot be d/c on controlled substances and pain will need to be controlled w/ Cymbalta, Robaxin, and tylenol    Bipolar 1 disorder [facility])  Assessment & Plan  · Patient 8/27 agreed to psych eval which is apprecaited  · Psych added Zyprexa  · C/w Cymbalta and Remeron    Septic embolism (HCC)  Assessment & Plan  · Patient noted to have abnormalities seen in the CT of the chest abdomen and pelvis concerning for septic emboli  · There is pleural effusion on the CT scan of the chest and also on repeat chest x-ray  · Patient is rather asymptomatic from pulmonary standpoint  · Continue with the antibiotics  · Thoracic surgery have evaluated the pt and she is not short of breath   · 8/15-imaging right hip shows no osseous involvement; concern for proximity of abscess status SI joint, but no SI joint involvement at this time  · Management as above under hip pain    Acute bacterial endocarditis  Assessment & Plan  · Patient noted to have tricuspid while vegetation on echocardiogram done in July  Patient had a transthoracic and also transesophageal echocardiogram done during the last hospital stay  · With septic emboli to lungs and posterior iliacus muscle  · Patient is transferred over here to be evaluated by CT surgery  · She was noncommittal to abstain from illegal drugs or unintended use of medications  · When she continues to abstain and completes iv abx treatment they would consider surgical correction - this will likely be as OP   · Continue IV cefazolin as above  · D/c tele    VTE Pharmacologic Prophylaxis:   Pharmacologic: Enoxaparin (Lovenox)  Mechanical VTE Prophylaxis in Place: Yes    Patient Centered Rounds: I have performed bedside rounds with nursing staff today  Discussions with Specialists or Other Care Team Provider: no    Education and Discussions with Family / Patient: pt    Time Spent for Care: 30 minutes  More than 50% of total time spent on counseling and coordination of care as described above      Current Length of Stay: 18 day(s)    Current Patient Status: Inpatient   Certification Statement: The patient will continue to require additional inpatient hospital stay due to need to complete antibiotic course    Discharge Plan: when abx complete    Code Status: Level 1 - Full Code      Subjective:   Pt upset b/c boyfriend  of heroin o/d    Objective:     Vitals:   Temp (24hrs), Av 9 °F (36 6 °C), Min:97 8 °F (36 6 °C), Max:98 °F (36 7 °C)    Temp:  [97 8 °F (36 6 °C)-98 °F (36 7 °C)] 97 8 °F (36 6 °C)  HR:  [] 102  Resp:  [18] 18  BP: (105-110)/(60-66) 105/60  SpO2:  [97 %-98 %] 97 %  Body mass index is 23 3 kg/m²  Input and Output Summary (last 24 hours): Intake/Output Summary (Last 24 hours) at 2020 1655  Last data filed at 2020 1300  Gross per 24 hour   Intake 720 ml   Output 450 ml   Net 270 ml       Physical Exam:     Physical Exam  Constitutional:       General: She is not in acute distress  Appearance: She is not ill-appearing  HENT:      Head: Normocephalic and atraumatic  Nose: Nose normal       Mouth/Throat:      Mouth: Mucous membranes are moist    Eyes:      Extraocular Movements: Extraocular movements intact  Conjunctiva/sclera: Conjunctivae normal    Neck:      Musculoskeletal: Normal range of motion and neck supple  Cardiovascular:      Rate and Rhythm: Normal rate and regular rhythm  Pulmonary:      Effort: Pulmonary effort is normal       Breath sounds: Normal breath sounds  No wheezing or rales  Abdominal:      General: Bowel sounds are normal  There is no distension  Palpations: Abdomen is soft  Tenderness: There is no abdominal tenderness  Musculoskeletal: Normal range of motion  Right lower leg: No edema  Left lower leg: No edema  Skin:     General: Skin is warm and dry  Neurological:      Mental Status: She is oriented to person, place, and time  Mental status is at baseline           Additional Data:     Labs:    Results from last 7 days   Lab Units 20  0618 20  0527   WBC Thousand/uL 5 10 5 86   HEMOGLOBIN g/dL 8 4* 9 1*   HEMATOCRIT % 27 5* 28 8*   PLATELETS Thousands/uL 355 446*   NEUTROS PCT %  --  44   LYMPHS PCT %  --  40   MONOS PCT %  --  8   EOS PCT %  --  6     Results from last 7 days   Lab Units 20  0618   POTASSIUM mmol/L 3 8   CHLORIDE mmol/L 105   CO2 mmol/L 29   BUN mg/dL 8   CREATININE mg/dL 0 57*   CALCIUM mg/dL 8 4   ALK PHOS U/L 161*   ALT U/L 19   AST U/L 40           * I Have Reviewed All Lab Data Listed Above  * Additional Pertinent Lab Tests Reviewed: Denice Barton Admission Reviewed        Recent Cultures (last 7 days):     Results from last 7 days   Lab Units 20  1615   BLOOD CULTURE  No Growth at 72 hrs         Last 24 Hours Medication List:   Current Facility-Administered Medications   Medication Dose Route Frequency Provider Last Rate    acetaminophen  650 mg Oral Q6H PRN Cielo Mode, DO      alteplase  2 mg Intracatheter Q12H PRN Cielo Mode, DO      calcium carbonate  1,000 mg Oral Daily PRN Bella Mims MD      cefazolin  2,000 mg Intravenous Q8H Asha Roca PA-C 2,000 mg (20 1647)    DULoxetine  30 mg Oral Daily Hetul Reyna, DO      enoxaparin  1 mg/kg Subcutaneous Q12H Diamond Dee MD      [START ON 2020] gabapentin  100 mg Oral TID Cielo Mode, DO      Followed by   Dustin Anton ON 2020] gabapentin  100 mg Oral BID Cielo Mode, DO      Followed by   Dustin Anton ON 2020] gabapentin  100 mg Oral HS Cielo Mode, DO      gabapentin  200 mg Oral TID Cielo Mode, DO      hydrocortisone   Topical 4x Daily PRN Cielo Mode, DO      iohexol  50 mL Oral 90 min pre-procedure Hetul Reyna, DO      lidocaine  2 patch Topical Daily Asha Roca PA-C      LORazepam  2 mg Intravenous Q3H PRN Rehana Shrestha PA-C      magnesium citrate  296 mL Oral Daily PRN Tan Hines MD      melatonin  6 mg Oral HS PRN Asha Roca PA-C      methocarbamol  750 mg Oral Q6H [facility] Xena Pacheco MD      methylnaltrexone bromide  12 mg Subcutaneous Q48H PRN Xena Pacheco MD     Karen Nine mirtazapine  30 mg Oral HS Trevon Ventura MD      morphine injection  3 mg Intravenous Q3H PRN Wilman Petty DO      [START ON 8/31/2020] morphine injection  2 mg Intravenous Q3H PRN Wilman Petty DO      nystatin   Topical BID Wilman Petty DO      OLANZapine  5 mg Oral HS Catrina Gregg MD      ondansetron  4 mg Intravenous Q6H PRN Trevon Ventura MD      oxyCODONE  10 mg Oral Q4H PRN BOO Hubbard      oxyCODONE  15 mg Oral Q4H PRN BOO Hubbard      saccharomyces boulardii  250 mg Oral BID Jessica Cohen MD      senna  1 tablet Oral BID Jessica Cohen MD          Today, Patient Was Seen By: Wilman Petty DO    ** Please Note: Dictation voice to text software may have been used in the creation of this document   **

## 2020-08-30 NOTE — ASSESSMENT & PLAN NOTE
· Recurrent MSSA bacteremia  · Initially admitted to [facility] from 07/15-blood culture were positive for MSSA bacteremia, but patient signed AMA on 07/22  Her repeat blood culture done on 7/21 was negative after 5 days  · [facility] on 07/30-blood culture came back positive for MSSA and she left again is medical advice on 08/04  Her blood cultures came back positive on 07/30, but the bacteremia cleared as of 8 /2  Patient left AMA on 08/04  · Readmitted to [address] [place] [address] on 08/10-blood culture came back positive for Staph aureus  · 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

## 2020-08-30 NOTE — ASSESSMENT & PLAN NOTE
Increase lovenox to 60mg Q12h  Due to severe pain consulted with vascular surgery for treatment options - appreciated  Picc continues to function well and post recent blood culture is negative  Elevation of LUE  PICC team attempted to move picc line to right arm given left arm DVT pain  Unsuccessful   IR consulted   IR exchanged PICC 8/26  Needs 3 months of AC - can d/c on NOAC if affordable

## 2020-08-30 NOTE — ASSESSMENT & PLAN NOTE
· Patient was complaining of severe back pain mainly in the lower part of the back and also in the sacral region  · Patient had MRI of the lumbar and thoracic spine during the recent hospital stay [facility] which was unremarkable  · - continue Cymbalta 30 mg daily - Plan to increase to 60 mg daily on 9/1  · - continue robaxin to 750 mg q 6  · - continue oxy 10 mg and 15 mg  prn  · - continue iv morphine 4 mg to q3 hrs prn for breakthrough  · -continue tylenol prn - refused ATC  · - continue lidocaine patch if pt does not refuse  · -continue gabapentin to 200mg TID - I offered increase in gabapentin but pt refused as she says it did not help in past and had w/drawal when she stopped it abruptly  · Iliacus muscle abscess is noted on CT scan, continue antibiotic treatment as above, no indication for drainage at this time  · Repeat CT scan shows improvement in septic emboli to iliacus muscle  · ketamine infusion discontinued  · Patient has been reluctant with interventions and procedures  She cites that she wants IV pain meds before intervention  Extensive discussion regarding acute pain service recommendations  Will proceed with following recommendations by acute pain service    · MRI completed today - read pending  · Hold off on thoracolumbar MRI as pain in right hip and prior MRI unremarkable  · Opioid, benzo, and Neurontin taper scanned into media  · Decrease Neurontin to 100 mg tid and and morphine to 2 mg q3h prn 8/31  · Neurontin taper already ordered  · Will have to follow taper as pt cannot be d/c on controlled substances and pain will need to be controlled w/ Cymbalta, Robaxin, and tylenol

## 2020-08-31 ENCOUNTER — APPOINTMENT (INPATIENT)
Dept: RADIOLOGY | Facility: HOSPITAL | Age: 30
DRG: 163 | End: 2020-08-31
Attending: INTERNAL MEDICINE
Payer: COMMERCIAL

## 2020-08-31 PROBLEM — R74.01 TRANSAMINITIS: Status: ACTIVE | Noted: 2020-08-31

## 2020-08-31 LAB
ALBUMIN SERPL BCP-MCNC: 2.6 G/DL (ref 3.5–5)
ALP SERPL-CCNC: 229 U/L (ref 46–116)
ALT SERPL W P-5'-P-CCNC: 56 U/L (ref 12–78)
ANION GAP SERPL CALCULATED.3IONS-SCNC: 5 MMOL/L (ref 4–13)
AST SERPL W P-5'-P-CCNC: 147 U/L (ref 5–45)
BACTERIA BLD CULT: NORMAL
BASOPHILS # BLD AUTO: 0.03 THOUSANDS/ΜL (ref 0–0.1)
BASOPHILS NFR BLD AUTO: 1 % (ref 0–1)
BILIRUB SERPL-MCNC: 0.28 MG/DL (ref 0.2–1)
BUN SERPL-MCNC: 11 MG/DL (ref 5–25)
CALCIUM SERPL-MCNC: 8.7 MG/DL (ref 8.3–10.1)
CHLORIDE SERPL-SCNC: 103 MMOL/L (ref 100–108)
CO2 SERPL-SCNC: 30 MMOL/L (ref 21–32)
CREAT SERPL-MCNC: 0.44 MG/DL (ref 0.6–1.3)
CRP SERPL QL: 14.3 MG/L
EOSINOPHIL # BLD AUTO: 0.42 THOUSAND/ΜL (ref 0–0.61)
EOSINOPHIL NFR BLD AUTO: 8 % (ref 0–6)
ERYTHROCYTE [DISTWIDTH] IN BLOOD BY AUTOMATED COUNT: 15.8 % (ref 11.6–15.1)
ERYTHROCYTE [SEDIMENTATION RATE] IN BLOOD: 37 MM/HOUR (ref 0–19)
GFR SERPL CREATININE-BSD FRML MDRD: 139 ML/MIN/1.73SQ M
GLUCOSE SERPL-MCNC: 98 MG/DL (ref 65–140)
HCT VFR BLD AUTO: 28.6 % (ref 34.8–46.1)
HGB BLD-MCNC: 9.1 G/DL (ref 11.5–15.4)
IMM GRANULOCYTES # BLD AUTO: 0.05 THOUSAND/UL (ref 0–0.2)
IMM GRANULOCYTES NFR BLD AUTO: 1 % (ref 0–2)
LYMPHOCYTES # BLD AUTO: 2.08 THOUSANDS/ΜL (ref 0.6–4.47)
LYMPHOCYTES NFR BLD AUTO: 41 % (ref 14–44)
MAGNESIUM SERPL-MCNC: 2 MG/DL (ref 1.6–2.6)
MCH RBC QN AUTO: 27.6 PG (ref 26.8–34.3)
MCHC RBC AUTO-ENTMCNC: 31.8 G/DL (ref 31.4–37.4)
MCV RBC AUTO: 87 FL (ref 82–98)
MONOCYTES # BLD AUTO: 0.49 THOUSAND/ΜL (ref 0.17–1.22)
MONOCYTES NFR BLD AUTO: 10 % (ref 4–12)
NEUTROPHILS # BLD AUTO: 1.99 THOUSANDS/ΜL (ref 1.85–7.62)
NEUTS SEG NFR BLD AUTO: 39 % (ref 43–75)
NRBC BLD AUTO-RTO: 0 /100 WBCS
PHOSPHATE SERPL-MCNC: 5.8 MG/DL (ref 2.7–4.5)
PLATELET # BLD AUTO: 291 THOUSANDS/UL (ref 149–390)
PMV BLD AUTO: 8.5 FL (ref 8.9–12.7)
POTASSIUM SERPL-SCNC: 3.9 MMOL/L (ref 3.5–5.3)
PROT SERPL-MCNC: 7.3 G/DL (ref 6.4–8.2)
RBC # BLD AUTO: 3.3 MILLION/UL (ref 3.81–5.12)
SODIUM SERPL-SCNC: 138 MMOL/L (ref 136–145)
WBC # BLD AUTO: 5.06 THOUSAND/UL (ref 4.31–10.16)

## 2020-08-31 PROCEDURE — 97167 OT EVAL HIGH COMPLEX 60 MIN: CPT

## 2020-08-31 PROCEDURE — 97530 THERAPEUTIC ACTIVITIES: CPT

## 2020-08-31 PROCEDURE — 84100 ASSAY OF PHOSPHORUS: CPT | Performed by: GENERAL PRACTICE

## 2020-08-31 PROCEDURE — 86140 C-REACTIVE PROTEIN: CPT | Performed by: INTERNAL MEDICINE

## 2020-08-31 PROCEDURE — 85652 RBC SED RATE AUTOMATED: CPT | Performed by: INTERNAL MEDICINE

## 2020-08-31 PROCEDURE — 99233 SBSQ HOSP IP/OBS HIGH 50: CPT | Performed by: INTERNAL MEDICINE

## 2020-08-31 PROCEDURE — 71046 X-RAY EXAM CHEST 2 VIEWS: CPT

## 2020-08-31 PROCEDURE — 99232 SBSQ HOSP IP/OBS MODERATE 35: CPT | Performed by: PHYSICIAN ASSISTANT

## 2020-08-31 PROCEDURE — 99233 SBSQ HOSP IP/OBS HIGH 50: CPT | Performed by: GENERAL PRACTICE

## 2020-08-31 PROCEDURE — 80053 COMPREHEN METABOLIC PANEL: CPT | Performed by: INTERNAL MEDICINE

## 2020-08-31 PROCEDURE — 85025 COMPLETE CBC W/AUTO DIFF WBC: CPT | Performed by: INTERNAL MEDICINE

## 2020-08-31 PROCEDURE — 83735 ASSAY OF MAGNESIUM: CPT | Performed by: GENERAL PRACTICE

## 2020-08-31 RX ORDER — DULOXETIN HYDROCHLORIDE 60 MG/1
60 CAPSULE, DELAYED RELEASE ORAL DAILY
Status: DISCONTINUED | OUTPATIENT
Start: 2020-09-01 | End: 2020-09-28 | Stop reason: HOSPADM

## 2020-08-31 RX ORDER — IBUPROFEN 600 MG/1
600 TABLET ORAL EVERY 8 HOURS SCHEDULED
Status: DISCONTINUED | OUTPATIENT
Start: 2020-08-31 | End: 2020-09-09

## 2020-08-31 RX ORDER — PANTOPRAZOLE SODIUM 20 MG/1
20 TABLET, DELAYED RELEASE ORAL
Status: DISCONTINUED | OUTPATIENT
Start: 2020-08-31 | End: 2020-09-10 | Stop reason: SDUPTHER

## 2020-08-31 RX ADMIN — ENOXAPARIN SODIUM 60 MG: 60 INJECTION SUBCUTANEOUS at 22:03

## 2020-08-31 RX ADMIN — GABAPENTIN 100 MG: 100 CAPSULE ORAL at 22:04

## 2020-08-31 RX ADMIN — GABAPENTIN 100 MG: 100 CAPSULE ORAL at 15:03

## 2020-08-31 RX ADMIN — METHOCARBAMOL TABLETS 750 MG: 750 TABLET, COATED ORAL at 23:35

## 2020-08-31 RX ADMIN — OXYCODONE HYDROCHLORIDE 15 MG: 10 TABLET ORAL at 12:04

## 2020-08-31 RX ADMIN — METHOCARBAMOL TABLETS 750 MG: 750 TABLET, COATED ORAL at 18:28

## 2020-08-31 RX ADMIN — CEFAZOLIN SODIUM 2000 MG: 2 SOLUTION INTRAVENOUS at 07:35

## 2020-08-31 RX ADMIN — LORAZEPAM 2 MG: 2 INJECTION INTRAMUSCULAR; INTRAVENOUS at 12:02

## 2020-08-31 RX ADMIN — MORPHINE SULFATE 2 MG: 2 INJECTION, SOLUTION INTRAMUSCULAR; INTRAVENOUS at 15:03

## 2020-08-31 RX ADMIN — CEFAZOLIN SODIUM 2000 MG: 2 SOLUTION INTRAVENOUS at 15:07

## 2020-08-31 RX ADMIN — MORPHINE SULFATE 2 MG: 2 INJECTION, SOLUTION INTRAMUSCULAR; INTRAVENOUS at 20:11

## 2020-08-31 RX ADMIN — LORAZEPAM 2 MG: 2 INJECTION INTRAMUSCULAR; INTRAVENOUS at 15:03

## 2020-08-31 RX ADMIN — OLANZAPINE 5 MG: 5 TABLET, FILM COATED ORAL at 22:04

## 2020-08-31 RX ADMIN — LORAZEPAM 2 MG: 2 INJECTION INTRAMUSCULAR; INTRAVENOUS at 07:42

## 2020-08-31 RX ADMIN — LORAZEPAM 2 MG: 2 INJECTION INTRAMUSCULAR; INTRAVENOUS at 22:10

## 2020-08-31 RX ADMIN — METHOCARBAMOL TABLETS 750 MG: 750 TABLET, COATED ORAL at 06:07

## 2020-08-31 RX ADMIN — OXYCODONE HYDROCHLORIDE 15 MG: 10 TABLET ORAL at 03:13

## 2020-08-31 RX ADMIN — OXYCODONE HYDROCHLORIDE 15 MG: 10 TABLET ORAL at 07:36

## 2020-08-31 RX ADMIN — Medication 250 MG: at 10:08

## 2020-08-31 RX ADMIN — MIRTAZAPINE 30 MG: 30 TABLET, FILM COATED ORAL at 22:04

## 2020-08-31 RX ADMIN — NYSTATIN: 100000 CREAM TOPICAL at 18:29

## 2020-08-31 RX ADMIN — OXYCODONE HYDROCHLORIDE 15 MG: 10 TABLET ORAL at 18:32

## 2020-08-31 RX ADMIN — GABAPENTIN 100 MG: 100 CAPSULE ORAL at 10:08

## 2020-08-31 RX ADMIN — LORAZEPAM 2 MG: 2 INJECTION INTRAMUSCULAR; INTRAVENOUS at 03:33

## 2020-08-31 RX ADMIN — METHOCARBAMOL TABLETS 750 MG: 750 TABLET, COATED ORAL at 12:03

## 2020-08-31 RX ADMIN — CEFAZOLIN SODIUM 2000 MG: 2 SOLUTION INTRAVENOUS at 23:36

## 2020-08-31 RX ADMIN — PANTOPRAZOLE SODIUM 20 MG: 20 TABLET, DELAYED RELEASE ORAL at 13:05

## 2020-08-31 RX ADMIN — MORPHINE SULFATE 2 MG: 2 INJECTION, SOLUTION INTRAMUSCULAR; INTRAVENOUS at 10:08

## 2020-08-31 RX ADMIN — IBUPROFEN 600 MG: 600 TABLET ORAL at 13:05

## 2020-08-31 RX ADMIN — Medication 250 MG: at 18:28

## 2020-08-31 RX ADMIN — ENOXAPARIN SODIUM 60 MG: 60 INJECTION SUBCUTANEOUS at 10:08

## 2020-08-31 RX ADMIN — IBUPROFEN 600 MG: 600 TABLET ORAL at 22:04

## 2020-08-31 RX ADMIN — DULOXETINE HYDROCHLORIDE 30 MG: 30 CAPSULE, DELAYED RELEASE ORAL at 10:08

## 2020-08-31 NOTE — ASSESSMENT & PLAN NOTE
· Patient with history of drug abuse and likely the source of bacteremia  · Patient has a history of signing against medical advise  · May benefit from drug rehab eventually if she becomes agreeable to host services  She was not interested during my conversation  · During the hospital stay in July of this year patient was positive for [facility] ,cocaine and opiates    Although pt previosuly reported to Dr Darwin Salgado that she had not used any drugs for several years now, but admitted to me that she was actively abusing cocaine and heroin

## 2020-08-31 NOTE — ASSESSMENT & PLAN NOTE
· Patient noted to have tricuspid while vegetation on echocardiogram done in July  Patient had a transthoracic and also transesophageal echocardiogram done during the last hospital stay    · With septic emboli to lungs and posterior iliacus muscle  · Patient is transferred over here to be evaluated by CT surgery  · She was noncommittal to abstain from illegal drugs or unintended use of medications  · When she continues to abstain and completes iv abx treatment they would consider surgical correction - this will likely be as OP   · Continue IV cefazolin as above  · D/c tele  · As pt having pleuritic CP, ID plans to repeat echo at end of the week

## 2020-08-31 NOTE — PROGRESS NOTES
Progress Note - Infectious Disease   Brit Gaitan 34 y o  female MRN: [id number]  Unit/Bed#: [facility] 526-01 Encounter: [id number]      Impression/Plan:  1  Recurrent/persistent MSSA bacteremia with TV infective endocarditis   Blood cultures from 8/10 remain positive   Prolonged course of IV antibiotic was previously recommended, but patient has left AMA on 2 prior occasions (once at [facility] and once from [facility])    She remains hemodynamically stable despite her ongoing bacteremia  Repeat blood cultures negative  PICC culture negative so no need to change PICC  Repeat blood cultures negative thus far  -continue cefazolin through 9/26/2020 to complete 6 weeks from the 1st negative blood culture  -recheck echocardiogram later this week  -follow up repeat blood cultures  -recheck CBC with diff and CMP     2  Tricuspid valve endocarditis, 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  7/21 MELISSA showed large vegetation on TV with severe regurgitation  7/15 CT abdomen/pelvis also showed bilateral renal parenchymal abnormalities concerning for septic emboli   No intra-abdominal abscess  Patient now having some pleuritic-type chest pain     -antibiotic plan as above  -recheck echocardiogram later this week  -CT surgery follow-up  -recheck chest x-ray today     3  Right iliacus muscle abscesses/sacroiliitis-repeat imaging without remaining abscess  MRI now with evidence of sacroiliitis that I suspect is septic   There are no destructive changes or fluid collection   Suspect this is the cause of the patient's ongoing pain    The patient's sedimentation rate and CRP or decreased  -antibiotics as above  -continue to monitor sedimentation rate and CRP  -pain management  -no clear indication for any surgical intervention for now     4  Recent left foot cellulitis with abscess   Likely site of injection of IV drugs versus ectopic foci in the setting of MSSA bacteremia   This appears to have healed with no evidence of active infection   -antibiotic plan as above      5  Back pain   More likely secondary to chronic pain, opioid dependence   20 Thoracic and lumbar spine MRIs performed at [facility] negative were for abscess or osteomyelitis   CT scan nondiagnostic for osteomyelitis or diskitis  Sedimentation rate and CRP are decreasing  -monitor back pain  -no repeat MRI lumbar spine for now  -serial exams  -acute pain service follow-up     6  Active IV heroin abuse   This is the causative factor for development of bacteremia and infective endocarditis   Patient has left AMA on prior occasions   HIV screen negative  -patient is not a candidate for at home PICC line/IV antibiotics  -acute pain service follow-up     7  Chronic HCV infection, transaminitis   Recent HIV was negative   The LFTs are waxing waning  -monitor LFTs      8  Left upper extremity DVT-in the setting of PICC line use   Patient now on anticoagulation  -vascular follow-up  -serial exam  -anticoagulation    Discussed the above management plan in detail with the primary service    Antibiotics:  Cefazolin restart 22  Negative blood cultures 16    Subjective:  Patient has no fever, chills, sweats; no nausea, vomiting, diarrhea; no cough, shortness of breath; no increased pain  No new symptoms  She is able to ambulate but with some pain  Objective:  Vitals:  Temp:  [97 8 °F (36 6 °C)-98 2 °F (36 8 °C)] 98 2 °F (36 8 °C)  HR:  [] 96  Resp:  [16-18] 16  BP: (102-115)/(53-63) 115/63  SpO2:  [96 %-97 %] 96 %  Temp (24hrs), Av 9 °F (36 6 °C), Min:97 8 °F (36 6 °C), Max:98 2 °F (36 8 °C)  Current: Temperature: 98 2 °F (36 8 °C)    Physical Exam:   General Appearance:  Alert, interactive, nontoxic, no acute distress  Throat: Oropharynx moist without lesions      Lungs:   Clear to auscultation bilaterally; no wheezes, rhonchi or rales; respirations unlabored   Heart:  RRR; no murmur, rub or gallop   Abdomen:   Soft, non-tender, non-distended, positive bowel sounds  Extremities: No clubbing, cyanosis or edema   Skin: No new rashes or lesions  No draining wounds noted  Labs, Imaging, & Other studies:   All pertinent labs and imaging studies were personally reviewed  Results from last 7 days   Lab Units 08/31/20  0541 08/28/20  0618 08/25/20  0527   WBC Thousand/uL 5 06 5 10 5 86   HEMOGLOBIN g/dL 9 1* 8 4* 9 1*   PLATELETS Thousands/uL 291 355 446*     Results from last 7 days   Lab Units 08/31/20  0541 08/28/20  0618  08/25/20  0527   SODIUM mmol/L 138 138  --  138   POTASSIUM mmol/L 3 9 3 8  --  4 1   CHLORIDE mmol/L 103 105  --  102   CO2 mmol/L 30 29  --  30   BUN mg/dL 11 8  --  8   CREATININE mg/dL 0 44* 0 57*  --  0 48*   EGFR ml/min/1 73sq m 139 127  --  135   CALCIUM mg/dL 8 7 8 4  --  8 7   AST U/L 147* 40  --   --    ALT U/L 56 19   < >  --    ALK PHOS U/L 229* 161*   < >  --     < > = values in this interval not displayed  Results from last 7 days   Lab Units 08/26/20  1615   BLOOD CULTURE  No Growth After 4 Days  Results from last 7 days   Lab Units 08/31/20  0540   CRP mg/L 14 3*     Sedimentation rate 37       MRI right hip-right sacroiliitis  With some a bone edema and hyperemia without osseous destruction  Right iliacus muscle edema and hyperemia but no collection      Images personally reviewed by me in PACS

## 2020-08-31 NOTE — ASSESSMENT & PLAN NOTE
· Recurrent MSSA bacteremia  · Initially admitted to [address] [place] [address] from 07/15-blood culture were positive for MSSA bacteremia, but patient signed AMA on 07/22  Her repeat blood culture done on 7/21 was negative after 5 days  · [facility] on 07/30-blood culture came back positive for MSSA and she left again is medical advice on 08/04  Her blood cultures came back positive on 07/30, but the bacteremia cleared as of 8 /2  Patient left AMA on 08/04  · Readmitted to [address] [place] [address] on 08/10-blood culture came back positive for Staph aureus  · 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

## 2020-08-31 NOTE — ASSESSMENT & PLAN NOTE
· Patient noted to have abnormalities seen in the CT of the chest abdomen and pelvis concerning for septic emboli  · There is pleural effusion on the CT scan of the chest and also on repeat chest x-ray  · Patient is rather asymptomatic from pulmonary standpoint  · Continue with the antibiotics  · Thoracic surgery have evaluated the pt and she is not short of breath   · 8/15-imaging right hip shows no osseous involvement; concern for proximity of abscess status SI joint, but no SI joint involvement at this time  · Management as above under hip pain  · Today c/o pleuritic CP  CXR shows no acute finding    Suspect pain could be related to septic emboli

## 2020-08-31 NOTE — PLAN OF CARE
Problem: PHYSICAL THERAPY ADULT  Goal: Performs mobility at highest level of function for planned discharge setting  See evaluation for individualized goals  Description: Treatment/Interventions: Functional transfer training, LE strengthening/ROM, Therapeutic exercise, Endurance training, Patient/family training, Equipment eval/education, Bed mobility, Spoke to nursing  Equipment Recommended: (TBD)       See flowsheet documentation for full assessment, interventions and recommendations  Outcome: Progressing  Note: Prognosis: Fair  Problem List: Decreased strength, Decreased endurance, Impaired balance, Decreased mobility, Decreased safety awareness, Pain  Assessment: Pt seen for PT treatment session with focus on bed mobility, functional transfers, functional mobility  Pt making limited progress toward goals  Pt continues to be significantly limited in mobility by pain, able to bear minimal weight through R LE in stance and during attempts at ambulation  Pt very self limiting, noted to throw self back onto bed after attempting 1 side step  PT attempted to educate pt on use of RW to unweight R LE in stance, however, pt resistant to education and refusing additional attempts  Pt left supine in bed with bed alarm intact and with all needs in reach  Pt will benefit from skilled therapy in order to address current impairments and functional limitations  PT to follow pt and recommending rehab once medically cleared  Barriers to Discharge: Decreased caregiver support, Inaccessible home environment     PT Discharge Recommendation: [id number]     PT - OK to Discharge: Yes(to rehab once medically cleared)    See flowsheet documentation for full assessment

## 2020-08-31 NOTE — PHYSICAL THERAPY NOTE
PHYSICAL THERAPY TREATMENT NOTE          Patient Name: Claude Hernandez  FXUOC'G Date: 8/31/2020 08/31/20 1011   Pain Assessment   Pain Assessment Tool 0-10   Pain Score Worst Possible Pain   Pain Location/Orientation Orientation: Right;Location: Hip   Patient's Stated Pain Goal No pain   Hospital Pain Intervention(s) Repositioned; Ambulation/increased activity; Rest   Restrictions/Precautions   Weight Bearing Precautions Per Order No   Other Precautions Multiple lines; Fall Risk;Pain   General   Chart Reviewed Yes   Response to Previous Treatment Patient with no complaints from previous session  Family/Caregiver Present No   Cognition   Overall Cognitive Status WFL   Arousal/Participation Alert   Attention Attends with cues to redirect   Orientation Level Oriented X4   Memory Decreased recall of precautions   Following Commands Follows one step commands with increased time or repetition   Comments Pt presents with self limiting behaviors this session, with c/o of "too much pain in hip to move", however, noted to be actively flexing/extending hip, bridging self in bed, and bearing weight through R leg to scoot on bed  Pt requires education on participation, pain control, and safety  Subjective   Subjective "I wish you guys were here yesterday"   Bed Mobility   Supine to Sit 5  Supervision   Additional items Increased time required;Verbal cues;HOB elevated; Bedrails   Sit to Supine 5  Supervision   Additional items Increased time required;Verbal cues;HOB elevated; Bedrails   Additional Comments Supine in bed upon PT arrival   Pt left supine in bed with bed alarm intact and all needs in reach at end of therapy session  Transfers   Sit to Stand 5  Supervision   Additional items Increased time required;Verbal cues   Stand to Sit 5  Supervision   Additional items Increased time required;Verbal cues   Additional Comments Transfers with [facility] for hand placement and safety    Pt refusing hands on assist from therapist for STS despite signficant difficulty coming to stance on first attempt and inability to come to full stand on second attempt  Ambulation/Elevation   Gait pattern Excessively slow; Step to;Short stride;Decreased R stance; Antalgic; Improper Weight shift  (side step)   Gait Assistance 5  Supervision   Assistive Device Rolling walker   Distance 1 side step to [facility]   Balance   Static Sitting Fair   Dynamic Sitting Fair -   Static Standing Poor +   Dynamic Standing Poor +   Ambulatory Poor +   Endurance Deficit   Endurance Deficit Yes   Endurance Deficit Description pain   Activity Tolerance   Activity Tolerance Patient limited by pain   Nurse Made Aware RN cleared pt to be seen by PT   Medical Staff Made Aware OT Cheri   Assessment   Prognosis Fair   Problem List Decreased strength;Decreased endurance; Impaired balance;Decreased mobility; Decreased safety awareness;Pain   Assessment Pt seen for PT treatment session with focus on bed mobility, functional transfers, functional mobility  Pt making limited progress toward goals  Pt continues to be significantly limited in mobility by pain, able to bear minimal weight through R LE in stance and during attempts at ambulation  Pt very self limiting, noted to throw self back onto bed after attempting 1 side step  PT attempted to educate pt on use of RW to unweight R LE in stance, however, pt resistant to education and refusing additional attempts  Pt left supine in bed with bed alarm intact and with all needs in reach  Pt will benefit from skilled therapy in order to address current impairments and functional limitations  PT to follow pt and recommending rehab once medically cleared  Barriers to Discharge Decreased caregiver support; Inaccessible home environment   Goals   Patient Goals to have less pain   STG Expiration Date 09/14/20   Short Term Goal #2 In addition to goals from intial evaluation: 1    Pt will demonstrate ability to perform functional transfers with Mod I in order to increase independence  2   Pt will demonstrate ability to ambulate 50+ ft with LRAD and Mod I in order to return to PLOF  3   Pt will demonstrate ability to negotiate full flight steps in order to return home safely  Plan   Treatment/Interventions Functional transfer training;LE strengthening/ROM; Therapeutic exercise; Endurance training;Patient/family training;Equipment eval/education; Bed mobility;Gait training;Spoke to nursing   Progress Slow progress, decreased activity tolerance   PT Frequency Other (Comment)  (3-5x/wk)   Recommendation   PT Discharge Recommendation Post-Acute Rehabilitation Services   Equipment Recommended Walker   PT - OK to Discharge Yes  (to rehab once medically cleared)     Amy Carrera, PT, DPT

## 2020-08-31 NOTE — OCCUPATIONAL THERAPY NOTE
Occupational Therapy Evaluation      rEnestine Valentinalejandro    8/31/2020    Principal Problem:    Bacteremia due to Staphylococcus aureus  Active Problems:    Acute bacterial endocarditis    Septic embolism (HCC)    Bipolar 1 disorder (HCC)    Right hip pain    Intravenous drug abuse ([address] )    DVT of axillary vein, acute left [facility])      Past Medical History:   Diagnosis Date    Abnormal Pap smear of cervix     Anxiety     Depression     Endocarditis     2018    Hepatitis C     HPV (human papilloma virus) anogenital infection        Past Surgical History:   Procedure Laterality Date    IR PICC LINE PLACEMENT DOUBLE LUMEN  8/26/2020    KNEE SURGERY Left     MOUTH SURGERY          08/31/20 1012   Note Type   Note type Eval/Treat   Restrictions/Precautions   Weight Bearing Precautions Per Order No   Other Precautions Multiple lines; Fall Risk;Pain   Pain Assessment   Pain Assessment Tool 0-10   Pain Score Worst Possible Pain   Pain Location/Orientation Orientation: Right;Location: Hip   Patient's Stated Pain Goal No pain   Hospital Pain Intervention(s) Repositioned; Ambulation/increased activity; Emotional support   Home Living   Type of [address] [place] [address] Two level;Bed/bath upstairs;1/2 bath on main level;Stairs to enter with rails  ([address]; 2STE; FF to 2nd flr)   Bathroom Shower/Tub Tub/shower unit   Bathroom Toilet Standard   Bathroom Equipment Grab bars in shower   [address]   (denies DME)   Prior Function   Level of [place] Independent with ADLs and functional mobility   Lives With Family  (mother, step father, brother and 5yo dtr)   Receives Help From Family   ADL Assistance Independent   IADLs Independent   Falls in the last 6 months 0   Vocational Unemployed   Lifestyle   Autonomy Pt reports being IND w/ all ADLS and IADLS PTA   Reciprocal Relationships Pt lives w/ mother, step father, brother, and her 5yo dtr   Pt reports her family is home and is able to provide A as needed  Service to Others Pt is unemployed  Intrinsic Gratification Pt reports enjoying spending time w/ her dtr  Psychosocial   Psychosocial (WDL) X   Patient Behaviors/Mood Anxious   Subjective   Subjective "Morphine only lasts for 10 seconds "   ADL   Where Assessed Edge of bed   Eating Assistance 5  Supervision/Setup   Grooming Assistance 5  Supervision/Setup   UB Bathing Assistance 5  Supervision/Setup   LB Bathing Assistance 3  Moderate Assistance   UB Dressing Assistance 5  Supervision/Setup   LB Dressing Assistance 3  Moderate [address]  3  Moderate Assistance   Bed Mobility   Supine to Sit 5  Supervision   Additional items Assist x 1; Increased time required;Verbal cues; Bedrails;HOB elevated   Sit to Supine 5  Supervision   Additional items Assist x 1; Increased time required;Verbal cues; Bedrails;HOB elevated   Additional Comments Pt laying supine in bed upon OT arrival  Pt returned laying supine in bed w/ all needs in reach s/p OT session  Pt observed to independently bridge in order to boost herself in bed w/ use of RLE and did not c/o pain, however screamed out in pain when attempting STS at EOB  Transfers   Sit to Stand 5  Supervision   Additional items Assist x 1; Increased time required;Verbal cues;Armrests   Stand to Sit 5  Supervision   Additional items Assist x 1; Increased time required;Verbal cues;Armrests   Additional Comments Transfers w/ RW  VC and TC required for safety and hand placement  Pt screaming out in pain during STS and shouted "I can't do it ! I can't walk! It hurts too much!" however Pt observed to independently perform x2 lateral scoots at EOB w/ use of RLE and did not c/o of pain w/ use  Functional Mobility   Functional Mobility 4  Minimal assistance   Additional Comments Pt demonstrated ability to take x1 side step at EOB w/ RW at [facility] A level  Pt screamed out in pain 2' RLE pain during mobility   Pt refusing to attempt further mobility despite therapist observing Pt independently perform lateral scoots at EOB w/ RLE weight bearing/use  Pt required SBA of 2nd for safety 2' impulsivity  Additional items Rolling walker   Balance   Static Sitting Fair   Dynamic Sitting Fair -   Static Standing Poor +   Dynamic Standing Poor +   Ambulatory Poor +   Activity Tolerance   Activity Tolerance Patient limited by fatigue;Patient limited by pain;Treatment limited secondary to medical complications (Comment)   Medical Staff Made Aware PT Manohar Gagnon; CM for safe dipso planning   Nurse Made Aware RN cleared Pt for OT eval   RUE Assessment   RUE Assessment WFL   LUE Assessment   LUE Assessment WFL   Hand Function   Gross Motor Coordination Functional   Fine Motor Coordination Functional   Sensation   Light Touch No apparent deficits   Cognition   Overall Cognitive Status WFL   Arousal/Participation Alert; Cooperative   Attention Attends with cues to redirect   Orientation Level Oriented X4   Memory Decreased recall of precautions   Following Commands Follows one step commands with increased time or repetition   Comments Pt cooperative to participate in therapy this day and attempt to ambulate and sit OOB in chair  Pt w/ highly self-limiting and manipulative behaviors  Pt required VC for safety awareness t/o session  Assessment   Limitation Decreased ADL status; Decreased UE strength;Decreased Safe judgement during ADL;Decreased endurance;Decreased high-level ADLs   Prognosis Fair   Assessment Pt is a 35 yo female seen for OT eval s/p adm to [facility] on 8/12/2020 w/ increasing R hip pain dx'd w/ bacteremia due to staphylococcus aureus  Pt has a past medical history of Abnormal Pap smear of cervix, Anxiety, Depression, Endocarditis, Hepatitis C, and HPV (human papilloma virus) anogenital infection, IV drug use, bipolar disorder  Pt with active OT orders and ambulate  orders  Pt is unemployed and resides w/ family in [facility] w/ 2STE   Pt reports family is home and is able to provide A as needed  Pt was I w/ ADLS and IADLS, drove, & required no use of DME PTA  Pt is currently demonstrating the following occupational deficits: S UB bathing/dressing, Mod A LB bathing/dressing and toileting, S bed mobility, S STS, and Min Ax1-2 1 step at EOB w/ RW  These deficits that are impacting pt's baseline areas of occupation are a result of the following impairments: pain, endurance, activity tolerance, functional mobility, forward functional reach, balance, functional standing tolerance, unsupportive home environment, decreased I w/ ADLS/IADLS, strength, decreased safety awareness and decreased insight into deficits  The following Occupational Performance Areas to address include: grooming, bathing/shower, toilet hygiene, dressing, health maintenance, functional mobility and clothing management  Based on the aforementioned OT evaluation, functional performance deficits, and assessments, pt has been identified as a high complexity evaluation  Recommend STR pending progress upon D/C  Pt to continue to benefit from acute immediate OT services to address the following goals 3-5x/week to  w/in 7-10 days:    Goals   Patient Goals To decrease pain    LTG Time Frame 7-10   Long Term Goal #1 Refer to goals below   Plan   Treatment Interventions ADL retraining;Functional transfer training; Endurance training;UE strengthening/ROM; Patient/family training;Equipment evaluation/education; Compensatory technique education; Activityengagement; Energy conservation   Goal Expiration Date 09/10/20   OT Frequency 3-5x/wk   Recommendation   OT Discharge Recommendation Post-Acute Rehabilitation Services  (pending progress)   Equipment Recommended Other (comment); Bedside commode;Tub seat with back  (RW)   OT - OK to Discharge Yes  (when medically cleared)   Modified [place] Scale   Modified [place] Scale 4         GOALS    1) Pt will improve activity tolerance to G for min 30 min txment sessions for increase engagement in functional tasks    2) Pt will complete UB/LB dressing/self care w/ mod I using adaptive device and DME as needed    3) Pt will complete bathing w/ Mod I w/ use of AE and DME as needed    4) Pt will complete toileting w/ mod I w/ G hygiene/thoroughness using DME as needed    5) Pt will improve functional transfers to Mod I on/off all surfaces using DME as needed w/ G balance/safety     6) Pt will improve functional mobility during ADL/IADL/leisure tasks to Mod I using DME as needed w/ G balance/safety     7) Pt will participate in simulated IADL management task to increase independence to Mod I w/ G safety and endurance    8) Pt will be attentive 100% of the time during ongoing cognitive assessment w/ G participation to assist w/ safe d/c planning/recommendations    9) Pt will demonstrate G carryover of pt/caregiver education and training as appropriate w/o cues w/ good tolerance to increase safety during functional tasks    10) Pt will demonstrate 100% carryover of energy conservation techniques t/o functional I/ADL/leisure tasks w/o cues s/p skilled education to increase endurance during functional tasks           Ever Jaffe, MS, OTR/L

## 2020-08-31 NOTE — PLAN OF CARE
Problem: OCCUPATIONAL THERAPY ADULT  Goal: Performs self-care activities at highest level of function for planned discharge setting  See evaluation for individualized goals  Description: Treatment Interventions: ADL retraining, Functional transfer training, Endurance training, UE strengthening/ROM, Patient/family training, Equipment evaluation/education, Compensatory technique education, Activityengagement, Energy conservation  Equipment Recommended: Other (comment), Bedside commode, Tub seat with back(RW)       See flowsheet documentation for full assessment, interventions and recommendations  Note: Limitation: Decreased ADL status, Decreased UE strength, Decreased Safe judgement during ADL, Decreased endurance, Decreased high-level ADLs  Prognosis: Fair  Assessment: Pt is a 35 yo female seen for OT eval s/p adm to [facility] on 8/12/2020 w/ increasing R hip pain dx'd w/ bacteremia due to staphylococcus aureus  Pt has a past medical history of Abnormal Pap smear of cervix, Anxiety, Depression, Endocarditis, Hepatitis C, and HPV (human papilloma virus) anogenital infection, IV drug use, bipolar disorder  Pt with active OT orders and ambulate  orders  Pt is unemployed and resides w/ family in [facility] w/ 2STE  Pt reports family is home and is able to provide A as needed  Pt was I w/ ADLS and IADLS, drove, & required no use of DME PTA  Pt is currently demonstrating the following occupational deficits: S UB bathing/dressing, Mod A LB bathing/dressing and toileting, S bed mobility, S STS, and Min Ax1-2 1 step at EOB w/ RW  These deficits that are impacting pt's baseline areas of occupation are a result of the following impairments: pain, endurance, activity tolerance, functional mobility, forward functional reach, balance, functional standing tolerance, unsupportive home environment, decreased I w/ ADLS/IADLS, strength, decreased safety awareness and decreased insight into deficits   The following Occupational Performance Areas to address include: grooming, bathing/shower, toilet hygiene, dressing, health maintenance, functional mobility and clothing management  Based on the aforementioned OT evaluation, functional performance deficits, and assessments, pt has been identified as a high complexity evaluation  Recommend STR pending progress upon D/C   Pt to continue to benefit from acute immediate OT services to address the following goals 3-5x/week to  w/in 7-10 days:      OT Discharge Recommendation: Post-Acute Rehabilitation Services(pending progress)  OT - OK to Discharge: Yes(when medically cleared)

## 2020-08-31 NOTE — PROGRESS NOTES
Progress Note - Acute Pain Service    Rachel Carlos 34 y o  female MRN: [id number]  Unit/Bed#: [facility] 526-01 Encounter: [id number]      Assessment:   Principal Problem:    Bacteremia due to Staphylococcus aureus  Active Problems:    Acute bacterial endocarditis    Septic embolism (HCC)    Bipolar 1 disorder (HCC)    Right hip pain    Intravenous drug abuse ([address] )    DVT of axillary vein, acute left (HCC)      Plan:   · Continue acetaminophen 650 mg p o  Q 6 hours p r n  mild pain  · Continue oxycodone 10 mg p o  q 4 hours p r n  moderate pain  · Continue oxycodone 15 mg p o  q 4 hours p r n  severe pain  · Decreased morphine to 2 mg IV q 3 hours p r n  breakthrough pain  · Continue duloxetine 30 mg p o  daily, plan to increase to 60 mg daily tomorrow  · Continue lorazepam 2 mg IV q 3 hours p r n  agitation, anxiety  · Decrease gabapentin to 100 mg p o  t i d  · Continue methocarbamol 750 mg p o  q 6 hours scheduled  · Continue lidocaine 5% patch, 2 patches for 12 hours daily  · Start ibuprofen 600 mg p o  q 8 hours scheduled  · Suggest start Prilosec along with ibuprofen  · Continue bowel regimen to avoid opioid induced constipation  · Will continue to follow scheduled wean of opioids, benzodiazepines and gabapentin as listed in the media tab  Next scheduled change will be decrease morphine to 2 mg IV q 4 hours p r n  breakthrough pain on   APS will continue to follow; please contact APS ( btwn [phone]) with any further questions    Pain History  Current pain location(s):  Right hip, lower back  Pain Scale:   9/10  Quality:  Sharp  24 hour history:  Patient states her pain is mildly improved since admission  Of note is tolerating decreasing opioids and understands plan to decrease again today    Patient states she is very upset that her boyfriend of 3 years  this weekend of an overdose of heroin, however states she plans to use this as motivation to "stay clean  "Patient much more alert and interactive today, looks much more comfortable  Patient has required less IV morphine over the weekend  Opioid requirement previous 24 hours:  Oxycodone 75 mg p o , morphine 11 mg IV       Meds/Allergies   all current active meds have been reviewed, current meds:   Current Facility-Administered Medications   Medication Dose Route Frequency    acetaminophen (TYLENOL) tablet 650 mg  650 mg Oral Q6H PRN    alteplase (CATHFLO) injection 2 mg  2 mg Intracatheter Q12H PRN    calcium carbonate (TUMS) chewable tablet 1,000 mg  1,000 mg Oral Daily PRN    ceFAZolin (ANCEF) IVPB (premix) 2,000 mg 50 mL  2,000 mg Intravenous Q8H    [START ON 9/1/2020] DULoxetine (CYMBALTA) delayed release capsule 60 mg  60 mg Oral Daily    enoxaparin (LOVENOX) subcutaneous injection 60 mg  1 mg/kg Subcutaneous Q12H [address]    gabapentin (NEURONTIN) capsule 100 mg  100 mg Oral TID    Followed by   Claudia Null ON 9/4/2020] gabapentin (NEURONTIN) capsule 100 mg  100 mg Oral BID    Followed by   Claudia Null ON 9/9/2020] gabapentin (NEURONTIN) capsule 100 mg  100 mg Oral HS    hydrocortisone 1 % cream   Topical 4x Daily PRN    iohexol (OMNIPAQUE) 240 MG/ML solution 50 mL  50 mL Oral 90 min pre-procedure    lidocaine (LIDODERM) 5 % patch 2 patch  2 patch Topical Daily    LORazepam (ATIVAN) injection 2 mg  2 mg Intravenous Q3H PRN    magnesium citrate (CITROMA) oral solution 296 mL  296 mL Oral Daily PRN    melatonin tablet 6 mg  6 mg Oral HS PRN    methocarbamol (ROBAXIN) tablet 750 mg  750 mg Oral Q6H JEFF    methylnaltrexone (RELISTOR) subcutaneous injection 12 mg  12 mg Subcutaneous Q48H PRN    mirtazapine (REMERON) tablet 30 mg  30 mg Oral HS    morphine injection 2 mg  2 mg Intravenous Q3H PRN    nystatin (MYCOSTATIN) cream   Topical BID    OLANZapine (ZyPREXA) tablet 5 mg  5 mg Oral HS    ondansetron (ZOFRAN) injection 4 mg  4 mg Intravenous Q6H PRN    oxyCODONE (ROXICODONE) immediate release tablet 10 mg  10 mg Oral Q4H PRN    oxyCODONE (ROXICODONE) IR tablet 15 mg  15 mg Oral Q4H PRN    saccharomyces boulardii (FLORASTOR) capsule 250 mg  250 mg Oral BID    senna (SENOKOT) tablet 8 6 mg  1 tablet Oral BID    and PTA meds:   None       Allergies   Allergen Reactions    Cat Hair Extract     Dog Epithelium     Latex     Pollen Extract        Objective     Temp:  [97 8 °F (36 6 °C)-98 2 °F (36 8 °C)] 98 2 °F (36 8 °C)  HR:  [] 96  Resp:  [16-18] 16  BP: (102-115)/(53-63) 115/63    Physical Exam  Vitals signs and nursing note reviewed  Constitutional:       General: She is awake  She is not in acute distress  Eyes:      Pupils: Pupils are equal, round, and reactive to light  Cardiovascular:      Rate and Rhythm: Normal rate and regular rhythm  Skin:     General: Skin is warm and dry  Neurological:      General: No focal deficit present  Mental Status: She is alert and oriented to person, place, and time  GCS: GCS eye subscore is 4  GCS verbal subscore is 5  GCS motor subscore is 6  Psychiatric:         Behavior: Behavior is cooperative  Lab Results:   Results from last 7 days   Lab Units 08/31/20  0541   WBC Thousand/uL 5 06   HEMOGLOBIN g/dL 9 1*   HEMATOCRIT % 28 6*   PLATELETS Thousands/uL 291      Results from last 7 days   Lab Units 08/31/20  0541   POTASSIUM mmol/L 3 9   CHLORIDE mmol/L 103   CO2 mmol/L 30   BUN mg/dL 11   CREATININE mg/dL 0 44*   CALCIUM mg/dL 8 7   ALK PHOS U/L 229*   ALT U/L 56   AST U/L 147*       Imaging Studies: I have personally reviewed pertinent reports  EKG, Pathology, and Other Studies: I have personally reviewed pertinent reports  Counseling / Coordination of Care  Total floor / unit time spent today 20 minutes  Greater than 50% of total time was spent with the patient and / or family counseling and / or coordination of care   A description of the counseling / coordination of care:  Patient interview, physical examination, review of medical record, review of imaging and laboratory data, development of pain management plan, discussion of pain management plan with patient and primary service      Jonathan Ladd PA-C

## 2020-08-31 NOTE — PROGRESS NOTES
Progress Note - Tristen Huerta 1990, 34 y o  female MRN: [id number]    [facility]: [id number]    Primary Care Provider: Ale Prakash PA-C   Date and time admitted to hospital: 8/12/2020  7:39 PM        * Bacteremia due to Staphylococcus aureus  Assessment & Plan  · Recurrent MSSA bacteremia  · Initially admitted to [address] [place] [address] from 07/15-blood culture were positive for MSSA bacteremia, but patient signed AMA on 07/22  Her repeat blood culture done on 7/21 was negative after 5 days  · 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 during the last hospital stay from 7/15-7/22  · Plan is to continue IV cefazolin for a 6 week course per ID recommendations from 8/15 when B Cx became neg  · 8/26 removed PICC and PICC cx neg  · B Cx from 8/26 neg    Transaminitis  Assessment & Plan  Check acute hep panel - pt reports hx of Hep C    DVT of axillary vein, acute left (HCC)  Assessment & Plan  Increase lovenox to 60mg Q12h  Due to severe pain consulted with vascular surgery for treatment options - appreciated  Picc continues to function well and post recent blood culture is negative  Elevation of LUE  PICC team attempted to move picc line to right arm given left arm DVT pain  Unsuccessful  IR consulted   IR exchanged PICC 8/26  Needs 3 months of AC - can d/c on NOAC if affordable    Intravenous drug abuse [facility])  Assessment & Plan  · Patient with history of drug abuse and likely the source of bacteremia    · Patient has a history of signing against medical advise  · May benefit from drug rehab eventually if she becomes agreeable to host services  She was not interested during my conversation  · During the hospital stay in July of this year patient was positive for [facility] ,cocaine and opiates  Although pt previosuly reported to Dr Sun Davies that she had not used any drugs for several years now, but admitted to me that she was actively abusing cocaine and heroin    Right hip pain  Assessment & Plan  · Patient was complaining of severe back pain mainly in the lower part of the back and also in the sacral region  · Patient had MRI of the lumbar and thoracic spine during the recent hospital stay in [facility] which was unremarkable  · - continue Cymbalta 30 mg daily - Plan to increase to 60 mg daily on 9/1  · - continue robaxin to 750 mg q 6  · - continue oxy 10 mg and 15 mg  prn  · - continue iv morphine 4 mg to q3 hrs prn for breakthrough  · -continue tylenol prn - refused ATC  · - continue lidocaine patch if pt does not refuse  · -continue gabapentin to 200mg TID - I offered increase in gabapentin but pt refused as she says it did not help in past and had w/drawal when she stopped it abruptly  · -today add ibuprofen RTC and ppi for GI ppx  · Iliacus muscle abscess is noted on CT scan, continue antibiotic treatment as above, no indication for drainage at this time  · Repeat CT scan shows improvement in septic emboli to iliacus muscle  · ketamine infusion discontinued  · Patient has been reluctant with interventions and procedures  She cites that she wants IV pain meds before intervention  Extensive discussion regarding acute pain service recommendations  Will proceed with following recommendations by acute pain service  · MRI completed 8/30 shows sacroiliitis which ID recs against surgical intervention    ID recs weekly CRP and ESR, which are trending down   · Hold off on thoracolumbar MRI as pain in right hip and prior MRI unremarkable  · Opioid, benzo, and Neurontin taper scanned into media  · Decrease Neurontin to 100 mg tid and and morphine to 2 mg q3h prn today  · Neurontin taper already ordered  · Will have to follow taper as pt cannot be d/c on controlled substances and pain will need to be controlled w/ Cymbalta, Robaxin, ibuprofen, and tylenol    [facility]  Assessment & Plan  · Patient 8/27 agreed to psych eval which is apprecaited  · Psych added Zyprexa  · C/w Cymbalta and Remeron    Septic embolism ([facility] )  Assessment & Plan  · Patient noted to have abnormalities seen in the CT of the chest abdomen and pelvis concerning for septic emboli  · There is pleural effusion on the CT scan of the chest and also on repeat chest x-ray  · Patient is rather asymptomatic from pulmonary standpoint  · Continue with the antibiotics  · Thoracic surgery have evaluated the pt and she is not short of breath   · 8/15-imaging right hip shows no osseous involvement; concern for proximity of abscess status SI joint, but no SI joint involvement at this time  · Management as above under hip pain  · Today c/o pleuritic CP  CXR shows no acute finding  Suspect pain could be related to septic emboli      Acute bacterial endocarditis  Assessment & Plan  · Patient noted to have tricuspid while vegetation on echocardiogram done in July  Patient had a transthoracic and also transesophageal echocardiogram done during the last hospital stay    · With septic emboli to lungs and posterior iliacus muscle  · Patient is transferred over here to be evaluated by CT surgery  · She was noncommittal to abstain from illegal drugs or unintended use of medications  · When she continues to abstain and completes iv abx treatment they would consider surgical correction - this will likely be as OP   · Continue IV cefazolin as above  · D/c tele  · As pt having pleuritic CP, ID plans to repeat echo at end of the week    VTE Pharmacologic Prophylaxis:   Pharmacologic: Enoxaparin (Lovenox)  Mechanical VTE Prophylaxis in Place: Yes    Patient Centered Rounds: I have performed bedside rounds with nursing staff today  Discussions with Specialists or Other Care Team Provider: APS    Education and Discussions with Family / Patient: pt    Time Spent for Care: 30 minutes  More than 50% of total time spent on counseling and coordination of care as described above  Current Length of Stay: 19 day(s)    Current Patient Status: Inpatient   Certification Statement: The patient will continue to require additional inpatient hospital stay due to need to complete 6 weeks of abx    Discharge Plan: when 6 weeks of abx complete    Code Status: Level 1 - Full Code      Subjective:   C/o severe hip pain, pleuritic CP, and vaginal discomfort    Objective:     Vitals:   Temp (24hrs), Av 1 °F (36 7 °C), Min:97 8 °F (36 6 °C), Max:98 4 °F (36 9 °C)    Temp:  [97 8 °F (36 6 °C)-98 4 °F (36 9 °C)] 98 4 °F (36 9 °C)  HR:  [] 111  Resp:  [16-18] 18  BP: (102-124)/(53-70) 124/70  SpO2:  [96 %-98 %] 98 %  Body mass index is 23 3 kg/m²  Input and Output Summary (last 24 hours): Intake/Output Summary (Last 24 hours) at 2020 1752  Last data filed at 2020 1650  Gross per 24 hour   Intake 530 ml   Output 1100 ml   Net -570 ml       Physical Exam:     Physical Exam  HENT:      Head: Normocephalic and atraumatic  Nose: Nose normal       Mouth/Throat:      Mouth: Mucous membranes are moist    Eyes:      Extraocular Movements: Extraocular movements intact  Conjunctiva/sclera: Conjunctivae normal    Neck:      Musculoskeletal: Normal range of motion and neck supple  Cardiovascular:      Rate and Rhythm: Normal rate and regular rhythm  Pulmonary:      Effort: Pulmonary effort is normal       Breath sounds: Normal breath sounds  No wheezing or rales  Abdominal:      General: Bowel sounds are normal  There is no distension  Palpations: Abdomen is soft  Tenderness: There is no abdominal tenderness     Musculoskeletal: Normal range of motion  Right lower leg: No edema  Left lower leg: No edema  Skin:     General: Skin is warm and dry  Neurological:      General: No focal deficit present  Mental Status: She is alert and oriented to person, place, and time  Additional Data:     Labs:    Results from last 7 days   Lab Units 08/31/20  0541   WBC Thousand/uL 5 06   HEMOGLOBIN g/dL 9 1*   HEMATOCRIT % 28 6*   PLATELETS Thousands/uL 291   NEUTROS PCT % 39*   LYMPHS PCT % 41   MONOS PCT % 10   EOS PCT % 8*     Results from last 7 days   Lab Units 08/31/20  0541   POTASSIUM mmol/L 3 9   CHLORIDE mmol/L 103   CO2 mmol/L 30   BUN mg/dL 11   CREATININE mg/dL 0 44*   CALCIUM mg/dL 8 7   ALK PHOS U/L 229*   ALT U/L 56   AST U/L 147*           * I Have Reviewed All Lab Data Listed Above  * Additional Pertinent [facility] Tests Reviewed: [facility]        Recent Cultures (last 7 days):     Results from last 7 days   Lab Units 08/26/20  1615   BLOOD CULTURE  No Growth After 4 Days         Last 24 Hours Medication List:   Current Facility-Administered Medications   Medication Dose Route Frequency Provider Last Rate    acetaminophen  650 mg Oral Q6H PRN Elieryna Cox, DO      alteplase  2 mg Intracatheter Q12H PRN Elieryna Cox, DO      calcium carbonate  1,000 mg Oral Daily PRN Mellody Nageotte, MD      cefazolin  2,000 mg Intravenous Q8H Asha Roca PA-C Stopped (08/31/20 1650)    [START ON 9/1/2020] DULoxetine  60 mg Oral Daily Elieryna Cox, DO      enoxaparin  1 mg/kg Subcutaneous Q12H [address] Shanta Nava MD      gabapentin  100 mg Oral TID Eligha Cox, DO      Followed by   Chip Oden ON 9/4/2020] gabapentin  100 mg Oral BID Eligha Cox, DO      Followed by   Chip Oden ON 9/9/2020] gabapentin  100 mg Oral HS Eligha Cox, DO      hydrocortisone   Topical 4x Daily PRN Eligha Cox, DO      ibuprofen  600 mg Oral Q8H [address] Buddy Handy DO      iohexol  50 mL Oral 90 min pre-procedure Hetul Anabell Kemp,       lidocaine  2 patch Topical Daily Asha Roca PA-C      LORazepam  2 mg Intravenous Q3H PRN Amara Metcalf PA-C      magnesium citrate  296 mL Oral Daily PRN Leonor Anaya MD      melatonin  6 mg Oral HS PRN Asha Roca PA-C      methocarbamol  750 mg Oral Q6H [address] Jonelle Schwarz MD      methylnaltrexone bromide  12 mg Subcutaneous Q48H PRN Jonelle Schwarz MD      mirtazapine  30 mg Oral HS Bia Barcenas MD      morphine injection  2 mg Intravenous Q3H PRN Kiel Hemnathen, DO      nystatin   Topical BID Kiel Stevens, DO      OLANZapine  5 mg Oral HS Latasha Senior MD      ondansetron  4 mg Intravenous Q6H PRN Bia Barcenas MD      oxyCODONE  10 mg Oral Q4H PRN Victorine Sa, CRNP      oxyCODONE  15 mg Oral Q4H PRN Victorine Sa, CRNP      pantoprazole  20 mg Oral Early Morning Kiel Stevens, DO      saccharomyces boulardii  250 mg Oral BID Leonor Anaya MD      senna  1 tablet Oral BID Leonor Anaya MD          Today, Patient Was Seen By: Kiel Stevens DO    ** Please Note: Dictation voice to text software may have been used in the creation of this document   **

## 2020-08-31 NOTE — ASSESSMENT & PLAN NOTE
· Patient was complaining of severe back pain mainly in the lower part of the back and also in the sacral region  · Patient had MRI of the lumbar and thoracic spine during the recent hospital stay in [facility] which was unremarkable  · - continue Cymbalta 30 mg daily - Plan to increase to 60 mg daily on 9/1  · - continue robaxin to 750 mg q 6  · - continue oxy 10 mg and 15 mg  prn  · - continue iv morphine 4 mg to q3 hrs prn for breakthrough  · -continue tylenol prn - refused ATC  · - continue lidocaine patch if pt does not refuse  · -continue gabapentin to 200mg TID - I offered increase in gabapentin but pt refused as she says it did not help in past and had w/drawal when she stopped it abruptly  · -today add ibuprofen RTC and ppi for GI ppx  · Iliacus muscle abscess is noted on CT scan, continue antibiotic treatment as above, no indication for drainage at this time  · Repeat CT scan shows improvement in septic emboli to iliacus muscle  · ketamine infusion discontinued  · Patient has been reluctant with interventions and procedures  She cites that she wants IV pain meds before intervention  Extensive discussion regarding acute pain service recommendations  Will proceed with following recommendations by acute pain service  · MRI completed 8/30 shows sacroiliitis which ID recs against surgical intervention    ID recs weekly CRP and ESR, which are trending down   · Hold off on thoracolumbar MRI as pain in right hip and prior MRI unremarkable  · Opioid, benzo, and Neurontin taper scanned into media  · Decrease Neurontin to 100 mg tid and and morphine to 2 mg q3h prn today  · Neurontin taper already ordered  · Will have to follow taper as pt cannot be d/c on controlled substances and pain will need to be controlled w/ Cymbalta, Robaxin, ibuprofen, and tylenol

## 2020-09-01 LAB
ALBUMIN SERPL BCP-MCNC: 2.8 G/DL (ref 3.5–5)
ALP SERPL-CCNC: 284 U/L (ref 46–116)
ALT SERPL W P-5'-P-CCNC: 72 U/L (ref 12–78)
ANION GAP SERPL CALCULATED.3IONS-SCNC: 6 MMOL/L (ref 4–13)
AST SERPL W P-5'-P-CCNC: 147 U/L (ref 5–45)
BILIRUB SERPL-MCNC: 0.28 MG/DL (ref 0.2–1)
BUN SERPL-MCNC: 14 MG/DL (ref 5–25)
CALCIUM SERPL-MCNC: 8.7 MG/DL (ref 8.3–10.1)
CHLORIDE SERPL-SCNC: 104 MMOL/L (ref 100–108)
CO2 SERPL-SCNC: 29 MMOL/L (ref 21–32)
CREAT SERPL-MCNC: 0.54 MG/DL (ref 0.6–1.3)
GFR SERPL CREATININE-BSD FRML MDRD: 130 ML/MIN/1.73SQ M
GLUCOSE SERPL-MCNC: 109 MG/DL (ref 65–140)
PHOSPHATE SERPL-MCNC: 5.5 MG/DL (ref 2.7–4.5)
POTASSIUM SERPL-SCNC: 4 MMOL/L (ref 3.5–5.3)
PROT SERPL-MCNC: 7.7 G/DL (ref 6.4–8.2)
SODIUM SERPL-SCNC: 139 MMOL/L (ref 136–145)

## 2020-09-01 PROCEDURE — 99232 SBSQ HOSP IP/OBS MODERATE 35: CPT | Performed by: HOSPITALIST

## 2020-09-01 PROCEDURE — 80074 ACUTE HEPATITIS PANEL: CPT | Performed by: HOSPITALIST

## 2020-09-01 PROCEDURE — 84100 ASSAY OF PHOSPHORUS: CPT | Performed by: HOSPITALIST

## 2020-09-01 PROCEDURE — 99232 SBSQ HOSP IP/OBS MODERATE 35: CPT | Performed by: INTERNAL MEDICINE

## 2020-09-01 PROCEDURE — 80053 COMPREHEN METABOLIC PANEL: CPT | Performed by: HOSPITALIST

## 2020-09-01 RX ORDER — DICYCLOMINE HYDROCHLORIDE 10 MG/1
10 CAPSULE ORAL 3 TIMES DAILY PRN
Status: DISCONTINUED | OUTPATIENT
Start: 2020-09-01 | End: 2020-09-28 | Stop reason: HOSPADM

## 2020-09-01 RX ADMIN — GABAPENTIN 100 MG: 100 CAPSULE ORAL at 21:27

## 2020-09-01 RX ADMIN — METHOCARBAMOL TABLETS 750 MG: 750 TABLET, COATED ORAL at 16:40

## 2020-09-01 RX ADMIN — MIRTAZAPINE 30 MG: 30 TABLET, FILM COATED ORAL at 21:27

## 2020-09-01 RX ADMIN — IBUPROFEN 600 MG: 600 TABLET ORAL at 21:27

## 2020-09-01 RX ADMIN — GABAPENTIN 100 MG: 100 CAPSULE ORAL at 16:39

## 2020-09-01 RX ADMIN — METHOCARBAMOL TABLETS 750 MG: 750 TABLET, COATED ORAL at 23:32

## 2020-09-01 RX ADMIN — MORPHINE SULFATE 2 MG: 2 INJECTION, SOLUTION INTRAMUSCULAR; INTRAVENOUS at 16:40

## 2020-09-01 RX ADMIN — CEFAZOLIN SODIUM 2000 MG: 2 SOLUTION INTRAVENOUS at 13:25

## 2020-09-01 RX ADMIN — OXYCODONE HYDROCHLORIDE 15 MG: 10 TABLET ORAL at 20:16

## 2020-09-01 RX ADMIN — IBUPROFEN 600 MG: 600 TABLET ORAL at 13:54

## 2020-09-01 RX ADMIN — LORAZEPAM 2 MG: 2 INJECTION INTRAMUSCULAR; INTRAVENOUS at 21:27

## 2020-09-01 RX ADMIN — ONDANSETRON 4 MG: 2 INJECTION INTRAMUSCULAR; INTRAVENOUS at 03:41

## 2020-09-01 RX ADMIN — METHOCARBAMOL TABLETS 750 MG: 750 TABLET, COATED ORAL at 13:54

## 2020-09-01 RX ADMIN — OXYCODONE HYDROCHLORIDE 10 MG: 10 TABLET ORAL at 13:54

## 2020-09-01 RX ADMIN — ENOXAPARIN SODIUM 60 MG: 60 INJECTION SUBCUTANEOUS at 21:27

## 2020-09-01 RX ADMIN — OLANZAPINE 5 MG: 5 TABLET, FILM COATED ORAL at 21:27

## 2020-09-01 RX ADMIN — ONDANSETRON 4 MG: 2 INJECTION INTRAMUSCULAR; INTRAVENOUS at 13:24

## 2020-09-01 RX ADMIN — DICYCLOMINE HYDROCHLORIDE 10 MG: 10 CAPSULE ORAL at 21:27

## 2020-09-01 RX ADMIN — MORPHINE SULFATE 2 MG: 2 INJECTION, SOLUTION INTRAMUSCULAR; INTRAVENOUS at 23:32

## 2020-09-01 RX ADMIN — LORAZEPAM 2 MG: 2 INJECTION INTRAMUSCULAR; INTRAVENOUS at 03:42

## 2020-09-01 RX ADMIN — MELATONIN 6 MG: at 23:32

## 2020-09-01 RX ADMIN — LORAZEPAM 2 MG: 2 INJECTION INTRAMUSCULAR; INTRAVENOUS at 18:06

## 2020-09-01 RX ADMIN — ONDANSETRON 4 MG: 2 INJECTION INTRAMUSCULAR; INTRAVENOUS at 21:27

## 2020-09-01 RX ADMIN — CEFAZOLIN SODIUM 2000 MG: 2 SOLUTION INTRAVENOUS at 21:26

## 2020-09-01 NOTE — ASSESSMENT & PLAN NOTE
· Patient was complaining of severe back pain mainly in the lower part of the back and also in the sacral region  · Patient had MRI of the lumbar and thoracic spine during the recent hospital stay in [facility] which was unremarkable  · - continue Cymbalta 30 mg daily - Plan to increase to 60 mg daily on 9/1  · - continue robaxin to 750 mg q 6  · - continue oxy 10 mg and 15 mg  prn  · - continue iv morphine 4 mg to q3 hrs prn for breakthrough  · -continue tylenol prn - refused ATC  · - continue lidocaine patch if pt does not refuse  · -continue gabapentin to 200mg TID - I offered increase in gabapentin but pt refused as she says it did not help in past and had w/drawal when she stopped it abruptly  · -today add ibuprofen RTC and ppi for GI ppx  · Iliacus muscle abscess is noted on CT scan, continue antibiotic treatment as above, no indication for drainage at this time  · Repeat CT scan shows improvement in septic emboli to iliacus muscle  · ketamine infusion discontinued  · Patient has been reluctant with interventions and procedures  She cites that she wants IV pain meds before intervention  Extensive discussion regarding acute pain service recommendations  Will proceed with following recommendations by acute pain service  · MRI completed 8/30 shows sacroiliitis which ID recs against surgical intervention    ID recs weekly CRP and ESR, which are trending down   · Hold off on thoracolumbar MRI as pain in right hip and prior MRI unremarkable  · Opioid, benzo, and Neurontin taper scanned into media  · Decrease Neurontin to 100 mg tid and and morphine to 2 mg q3h prn on 8/31  · Neurontin taper already ordered  · Will have to follow taper as pt cannot be d/c on controlled substances and pain will need to be controlled w/ Cymbalta, Robaxin, ibuprofen, and tylenol

## 2020-09-01 NOTE — ASSESSMENT & PLAN NOTE
· Patient noted to have abnormalities seen in the CT of the chest abdomen and pelvis concerning for septic emboli  · There is pleural effusion on the CT scan of the chest and also on repeat chest x-ray  · Patient is rather asymptomatic from pulmonary standpoint  · Continue with the antibiotics  · Thoracic surgery have evaluated the pt and she is not short of breath   · 8/15-imaging right hip shows no osseous involvement; concern for proximity of abscess status SI joint, but no SI joint involvement at this time  · Management as above under hip pain  · c/o pleuritic CP  CXR shows no acute finding  Suspect pain could be related to septic emboli   No pain today

## 2020-09-01 NOTE — ASSESSMENT & PLAN NOTE
· Patient noted to have tricuspid while vegetation on echocardiogram done in July  Patient had a transthoracic and also transesophageal echocardiogram done during the last hospital stay    · With septic emboli to lungs and posterior iliacus muscle  · Patient is transferred over here to be evaluated by CT surgery  · She was noncommittal to abstain from illegal drugs or unintended use of medications  · When she continues to abstain and completes iv abx treatment they would consider surgical correction - this will likely be as OP   · Continue IV cefazolin as above  · repeat echo tomorrow per ID

## 2020-09-01 NOTE — PROGRESS NOTES
Pt refused all meds, assessments, blood work, vitals, etc this morning  Dr Erin Canales informed  Dr  donaldo with pt, who then agreed to IV zofran and abx around 1300  After abx started, pt began requesting all her PRN meds (Ativan, morphine, oxy)  Discussed with Dr Erin Canales, who instructed me to give her 10mg oxy, and not permit any other PRN medications until an hour later  She stated that none of these PRN medications should be given together, as pt is very sleepy  Pt informed of plan, and she expresses a lot of anger with this

## 2020-09-01 NOTE — PROGRESS NOTES
Progress Note - Anabell Friday 1990, 34 y o  female MRN: [id number]    Unit/Bed#: [facility] 526-01 Encounter: [id number]    Primary Care Provider: Michelle Block PA-C   Date and time admitted to hospital: 8/12/2020  7:39 PM        Transaminitis  Assessment & Plan  Check acute hep panel - pt reports hx of Hep C  Pt denied blood work today so this couldnot be done    DVT of axillary vein, acute left (HCC)  Assessment & Plan  Increase lovenox to 60mg Q12h  Due to severe pain consulted with vascular surgery for treatment options - appreciated  Picc continues to function well and post recent blood culture is negative  Elevation of LUE  PICC team attempted to move picc line to right arm given left arm DVT pain  Unsuccessful  IR consulted   IR exchanged PICC 8/26  Needs 3 months of AC - can d/c on NOAC if affordable    Intravenous drug abuse [facility])  Assessment & Plan  · Patient with history of drug abuse and likely the source of bacteremia  · Patient has a history of signing against medical advise  · May benefit from drug rehab eventually if she becomes agreeable to host services  She was not interested during my conversation  · During the hospital stay in July of this year patient was positive for [facility] ,cocaine and opiates  Although pt previosuly reported to Dr Rissa Ram that she had not used any drugs for several years now, but admitted to Dr Cristina Chavez that she was actively abusing cocaine and heroin    Right hip pain  Assessment & Plan  · Patient was complaining of severe back pain mainly in the lower part of the back and also in the sacral region  · Patient had MRI of the lumbar and thoracic spine during the recent hospital stay [facility] which was unremarkable      · - continue Cymbalta 30 mg daily - Plan to increase to 60 mg daily on 9/1  · - continue robaxin to 750 mg q 6  · - continue oxy 10 mg and 15 mg  prn  · - continue iv morphine 4 mg to q3 hrs prn for breakthrough  · -continue tylenol prn - refused ATC  · - continue lidocaine patch if pt does not refuse  · -continue gabapentin to 200mg TID - I offered increase in gabapentin but pt refused as she says it did not help in past and had w/drawal when she stopped it abruptly  · -today add ibuprofen RTC and ppi for GI ppx  · Iliacus muscle abscess is noted on CT scan, continue antibiotic treatment as above, no indication for drainage at this time  · Repeat CT scan shows improvement in septic emboli to iliacus muscle  · ketamine infusion discontinued  · Patient has been reluctant with interventions and procedures  She cites that she wants IV pain meds before intervention  Extensive discussion regarding acute pain service recommendations  Will proceed with following recommendations by acute pain service  · MRI completed 8/30 shows sacroiliitis which ID recs against surgical intervention  ID recs weekly CRP and ESR, which are trending down   · Hold off on thoracolumbar MRI as pain in right hip and prior MRI unremarkable  · Opioid, benzo, and Neurontin taper scanned into media  · Decrease Neurontin to 100 mg tid and and morphine to 2 mg q3h prn on 8/31  · Neurontin taper already ordered  · Will have to follow taper as pt cannot be d/c on controlled substances and pain will need to be controlled w/ Cymbalta, Robaxin, ibuprofen, and tylenol    Bipolar 1 disorder ([facility] )  Assessment & Plan  · Patient 8/27 agreed to psych eval which is apprecaited  · Psych added Zyprexa  · C/w Cymbalta and Remeron    Septic embolism ([facility] )  Assessment & Plan  · Patient noted to have abnormalities seen in the CT of the chest abdomen and pelvis concerning for septic emboli  · There is pleural effusion on the CT scan of the chest and also on repeat chest x-ray  · Patient is rather asymptomatic from pulmonary standpoint  · Continue with the antibiotics    · Thoracic surgery have evaluated the pt and she is not short of breath   · 8/15-imaging right hip shows no osseous involvement; concern for proximity of abscess status SI joint, but no SI joint involvement at this time  · Management as above under hip pain  · c/o pleuritic CP  CXR shows no acute finding  Suspect pain could be related to septic emboli  No pain today      Acute bacterial endocarditis  Assessment & Plan  · Patient noted to have tricuspid while vegetation on echocardiogram done in July  Patient had a transthoracic and also transesophageal echocardiogram done during the last hospital stay  · With septic emboli to lungs and posterior iliacus muscle  · Patient is transferred over here to be evaluated by CT surgery  · She was noncommittal to abstain from illegal drugs or unintended use of medications  · When she continues to abstain and completes iv abx treatment they would consider surgical correction - this will likely be as OP   · Continue IV cefazolin as above  · repeat echo tomorrow per ID    * Bacteremia due to Staphylococcus aureus  Assessment & Plan  · Recurrent MSSA bacteremia  · Initially admitted to [facility] from 07/15-blood culture were positive for MSSA bacteremia, but patient signed AMA on 07/22  Her repeat blood culture done on 7/21 was negative after 5 days  · [facility] on 07/30-blood culture came back positive for MSSA and she left again is medical advice on 08/04  Her blood cultures came back positive on 07/30, but the bacteremia cleared as of 8 /2  Patient left AMA on 08/04  · Readmitted to [address] [place] [address] on 08/10-blood culture came back positive for Staph aureus  · 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        [facility] Internal Medicine Progress Note  Patient: Rosario Lopez 34 y o  female MRN: [id number]  PCP: Benoit Valentine PA-C  Unit/Bed#: [facility] 526-01 Encounter: [id number]  Date Of Visit: 20    Assessment:    Principal Problem:    Bacteremia due to Staphylococcus aureus  Active Problems:    Acute bacterial endocarditis    Septic embolism (HCC)    Bipolar 1 disorder (HCC)    Right hip pain    Intravenous drug abuse ([address] )    DVT of axillary vein, acute left (HCC)    Transaminitis      Plan:       VTE Pharmacologic Prophylaxis:   Pharmacologic: Enoxaparin (Lovenox)  Mechanical VTE Prophylaxis in Place: Yes    Patient Centered Rounds: I have performed bedside rounds with nursing staff today  Discussions with Specialists or Other Care Team Provider:     Education and Discussions with Family / Patient: patient    Time Spent for Care: 30 minutes  More than 50% of total time spent on counseling and coordination of care as described above  Current Length of Stay: 20 day(s)    Current Patient Status: Inpatient   Certification Statement: The patient will continue to require additional inpatient hospital stay due to IV MEds    Discharge Plan / Estimated Discharge Date:     Code Status: Level 1 - Full Code      Subjective:   Patient sleeping calmly in the bed  Does wake up on calling her name  She tells me she is not feeling well, having pain in her stomach, she thinks he she ate a lot in supper last night and since then feels feeling neg this, complaining of nausea and vomiting  Nurses and told me that she was denying all other medications this morning  Upon talking to her she states that she denied them due to her nausea  But she had also denied IV antibiotics which when asked the reason for it she said that she was fine with IV antibiotics  Hence she agreed to receive IV antibiotics  I also offered her IV nausea medication to which also she agreed      Objective:     Vitals:   Temp (24hrs), Av 8 °F (36 6 °C), Min:97 2 °F (36 2 °C), Max:98 3 °F (36 8 °C)    Temp:  [97 2 °F (36 2 °C)-98 3 °F (36 8 °C)] 97 2 °F (36 2 °C)  HR:  [102] 102  Resp:  [18] 18  BP: (113-136)/(64-74) 113/64  SpO2:  [97 %-98 %] 97 %  Body mass index is 23 3 kg/m²  Input and Output Summary (last 24 hours): Intake/Output Summary (Last 24 hours) at 9/1/2020 1919  Last data filed at 9/1/2020 1401  Gross per 24 hour   Intake 80 ml   Output 475 ml   Net -395 ml       Physical Exam:     Physical Exam  Vitals signs reviewed  HENT:      Head: Normocephalic and atraumatic  Cardiovascular:      Rate and Rhythm: Normal rate and regular rhythm  Heart sounds: Normal heart sounds  No murmur  No gallop  Pulmonary:      Effort: Pulmonary effort is normal  No respiratory distress  Breath sounds: Normal breath sounds  No wheezing  Abdominal:      General: There is no distension  Palpations: Abdomen is soft  Tenderness: There is no abdominal tenderness  Neurological:      Mental Status: She is alert and oriented to person, place, and time  Additional Data:     Labs:    Results from last 7 days   Lab Units 08/31/20  0541   WBC Thousand/uL 5 06   HEMOGLOBIN g/dL 9 1*   HEMATOCRIT % 28 6*   PLATELETS Thousands/uL 291   NEUTROS PCT % 39*   LYMPHS PCT % 41   MONOS PCT % 10   EOS PCT % 8*     Results from last 7 days   Lab Units 08/31/20  0541   POTASSIUM mmol/L 3 9   CHLORIDE mmol/L 103   CO2 mmol/L 30   BUN mg/dL 11   CREATININE mg/dL 0 44*   CALCIUM mg/dL 8 7   ALK PHOS U/L 229*   ALT U/L 56   AST U/L 147*           * I Have Reviewed All Lab Data Listed Above  * Additional Pertinent Lab Tests Reviewed: No New Labs Available For Today    Imaging:    Imaging Reports Reviewed Today Include:   Imaging Personally Reviewed by Myself Includes:     Recent Cultures (last 7 days):     Results from last 7 days   Lab Units 08/26/20  1615   BLOOD CULTURE  No Growth After 5 Days         Last 24 Hours Medication List:   Current Facility-Administered Medications   Medication Dose Route Frequency Provider Last Rate    acetaminophen  650 mg Oral Q6H PRN Wilman Nipper, DO      alteplase  2 mg Intracatheter Q12H PRN Wilman Nipper, DO      calcium carbonate  1,000 mg Oral Daily PRN Trevon Ventura MD      cefazolin  2,000 mg Intravenous Q8H Asha Roca PA-C Stopped (09/01/20 1401)    dicyclomine  10 mg Oral TID PRN Elo Gillespie MD      DULoxetine  60 mg Oral Daily Wilman Nipper, DO      enoxaparin  1 mg/kg Subcutaneous Q12H [address] Sasha Calhoun MD      gabapentin  100 mg Oral TID Wilman Nipper, DO      Followed by   Wanda Calloway ON 9/4/2020] gabapentin  100 mg Oral BID Wilman Nipper, DO      Followed by   Wanda Calloway ON 9/9/2020] gabapentin  100 mg Oral HS Wilman Nipper, DO      hydrocortisone   Topical 4x Daily PRN Wilman Nipper, DO      ibuprofen  600 mg Oral Pending sale to [facility] Wilman Nipper, DO      iohexol  50 mL Oral 90 min pre-procedure Mychal Reyna,       lidocaine  2 patch Topical Daily Asha Roca PA-C      LORazepam  2 mg Intravenous Q3H PRN Porsche Sanchez PA-C      magnesium citrate  296 mL Oral Daily PRN Jessica Cohen MD      melatonin  6 mg Oral HS PRN Asha Roca PA-C      methocarbamol  750 mg Oral Q6H [address] Sasha Calhoun MD      methylnaltrexone bromide  12 mg Subcutaneous Q48H PRN Sasha Calhoun MD      mirtazapine  30 mg Oral HS Trevon Ventura MD      morphine injection  2 mg Intravenous Q3H PRN Wilman Nipper, DO      nystatin   Topical BID Wilman Nipper, DO      OLANZapine  5 mg Oral HS Catrina Gregg MD      ondansetron  4 mg Intravenous Q6H PRN Trevon Ventura MD      oxyCODONE  10 mg Oral Q4H PRN BOO Hubbard      oxyCODONE  15 mg Oral Q4H PRN BOO Hubbard      pantoprazole  20 mg Oral Early Morning Wilman Nipper, DO      saccharomyces boulardii  250 mg Oral BID Jessica Cohen MD      senna  1 tablet Oral BID Jessica Cohen MD          Today, Patient Was Seen By: Elo Gillespie MD    ** Please Note: This note has been constructed using a voice recognition system   **

## 2020-09-01 NOTE — ASSESSMENT & PLAN NOTE
· Patient with history of drug abuse and likely the source of bacteremia  · Patient has a history of signing against medical advise  · May benefit from drug rehab eventually if she becomes agreeable to host services  She was not interested during my conversation  · During the hospital stay in July of this year patient was positive for [facility] ,cocaine and opiates    Although pt previosuly reported to Dr Toni Luna that she had not used any drugs for several years now, but admitted to Dr Rand Quijano that she was actively abusing cocaine and heroin

## 2020-09-01 NOTE — NURSING NOTE
Pt refusing all meds, blood draws, and vital sign checks this morning  This includes IVABX  She states she wants to be left alone  Attempted to educate pt on importance of abx as they relate to her bacteremia  She still refuses  Dr Giselle Mejia made aware

## 2020-09-01 NOTE — PROGRESS NOTES
Progress Note - Infectious Disease   Sony Mccann 34 y o  female MRN: [id number]  Unit/Bed#: [facility] 526-01 Encounter: [id number]      Impression/Plan:  1  Recurrent/persistent MSSA bacteremia with TV infective endocarditis   Blood cultures from 8/10 remain positive   Prolonged course of IV antibiotic was previously recommended, but patient has left AMA on 2 prior occasions (once at [facility] and once from [facility])    She remains hemodynamically stable despite her ongoing bacteremia   PICC culture negative so no need to change PICC  the bacteremia has cleared  -continue cefazolin through 9/26/2020 to complete 6 weeks from the 1st negative blood culture  -recheck echocardiogram tomorrow  -recheck CBC with diff and CMP     2  Tricuspid valve endocarditis, 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  7/21 MELISSA showed large vegetation on TV with severe regurgitation  7/15 CT abdomen/pelvis also showed bilateral renal parenchymal abnormalities concerning for septic emboli   No intra-abdominal abscess  Patient now having some pleuritic-type chest pain     -antibiotic plan as above  -recheck echocardiogram tomorrow  -CT surgery follow-up     3  Right iliacus muscle abscesses/sacroiliitis-repeat imaging without remaining abscess  MRI now with evidence of sacroiliitis that I suspect is septic   There are no destructive changes or fluid collection   Suspect this is the cause of the patient's ongoing pain    The patient's sedimentation rate and CRP or decreased  -antibiotics as above  -continue to monitor sedimentation rate and CRP  -pain management  -no clear indication for any surgical intervention for now     4  Recent left foot cellulitis with abscess   Likely site of injection of IV drugs versus ectopic foci in the setting of MSSA bacteremia   This appears to have healed with no evidence of active infection   -antibiotic plan as above      5  Back pain   More likely secondary to chronic pain, opioid dependence   20 Thoracic and lumbar spine MRIs performed at [facility] negative were for abscess or osteomyelitis   CT scan nondiagnostic for osteomyelitis or diskitis  Sedimentation rate and CRP are decreasing  -monitor back pain  -no repeat MRI lumbar spine for now  -serial exams  -acute pain service follow-up     6  Active IV heroin abuse   This is the causative factor for development of bacteremia and infective endocarditis   Patient has left AMA on prior occasions   HIV screen negative  -patient is not a candidate for at home PICC line/IV antibiotics  -acute pain service follow-up     7  Chronic HCV infection, transaminitis   Recent HIV was negative   The LFTs are waxing waning  -monitor LFTs      8  Left upper extremity DVT-in the setting of PICC line use   Patient now on anticoagulation  -vascular follow-up  -serial exam  -anticoagulation    Discussed the above management plan in detail with the primary service    Antibiotics:  Cefazolin restart 23  Negative blood cultures 17    Subjective:  Patient has no fever, chills, sweats; having some nausea today but no vomiting or diarrhea; no cough, shortness of breath; no increased pain  No new symptoms  Objective:  Vitals:  Temp:  [98 3 °F (36 8 °C)-98 4 °F (36 9 °C)] 98 3 °F (36 8 °C)  HR:  [102-111] 102  Resp:  [18] 18  BP: (124-136)/(70-74) 136/74  SpO2:  [98 %] 98 %  Temp (24hrs), Av 4 °F (36 9 °C), Min:98 3 °F (36 8 °C), Max:98 4 °F (36 9 °C)  Current: Temperature: 98 3 °F (36 8 °C)    Physical Exam:   General Appearance:  Alert, interactive, nontoxic, no acute distress  Throat: Oropharynx moist without lesions  Lungs:   Clear to auscultation bilaterally; no wheezes, rhonchi or rales; respirations unlabored   Heart:  Tachycardic; no murmur, rub or gallop   Abdomen:   Soft, non-tender, non-distended, positive bowel sounds  Extremities: No clubbing, cyanosis or edema   Skin: No new rashes or lesions  No draining wounds noted         Labs, Imaging, & Other studies:   All pertinent labs and imaging studies were personally reviewed  Results from last 7 days   Lab Units 08/31/20  0541 08/28/20  0618   WBC Thousand/uL 5 06 5 10   HEMOGLOBIN g/dL 9 1* 8 4*   PLATELETS Thousands/uL 291 355     Results from last 7 days   Lab Units 08/31/20  0541 08/28/20  0618   SODIUM mmol/L 138 138   POTASSIUM mmol/L 3 9 3 8   CHLORIDE mmol/L 103 105   CO2 mmol/L 30 29   BUN mg/dL 11 8   CREATININE mg/dL 0 44* 0 57*   EGFR ml/min/1 73sq m 139 127   CALCIUM mg/dL 8 7 8 4   AST U/L 147* 40   ALT U/L 56 19   ALK PHOS U/L 229* 161*     Results from last 7 days   Lab Units 08/26/20  1615   BLOOD CULTURE  No Growth After 5 Days  Results from last 7 days   Lab Units 08/31/20  0540   CRP mg/L 14 3*        chest x-ray-decreased right atelectasis/infiltrate  Diminished nodular opacities  Mild atelectasis on the left      Images personally reviewed by me in PACS

## 2020-09-01 NOTE — ASSESSMENT & PLAN NOTE
· Recurrent MSSA bacteremia  · Initially admitted to [facility] from 07/15-blood culture were positive for MSSA bacteremia, but patient signed AMA on 07/22  Her repeat blood culture done on 7/21 was negative after 5 days  · [facility] on 07/30-blood culture came back positive for MSSA and she left again is medical advice on 08/04  Her blood cultures came back positive on 07/30, but the bacteremia cleared as of 8 /2  Patient left AMA on 08/04  · Readmitted to [address] [place] [address] on 08/10-blood culture came back positive for Staph aureus  · 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

## 2020-09-02 ENCOUNTER — APPOINTMENT (INPATIENT)
Dept: NON INVASIVE DIAGNOSTICS | Facility: HOSPITAL | Age: 30
DRG: 163 | End: 2020-09-02
Payer: COMMERCIAL

## 2020-09-02 LAB
HAV IGM SER QL: ABNORMAL
HBV CORE IGM SER QL: ABNORMAL
HBV SURFACE AG SER QL: ABNORMAL
HCV AB SER QL: ABNORMAL

## 2020-09-02 PROCEDURE — 99232 SBSQ HOSP IP/OBS MODERATE 35: CPT | Performed by: HOSPITALIST

## 2020-09-02 PROCEDURE — 93308 TTE F-UP OR LMTD: CPT

## 2020-09-02 PROCEDURE — 93325 DOPPLER ECHO COLOR FLOW MAPG: CPT | Performed by: INTERNAL MEDICINE

## 2020-09-02 PROCEDURE — 93308 TTE F-UP OR LMTD: CPT | Performed by: INTERNAL MEDICINE

## 2020-09-02 PROCEDURE — 93321 DOPPLER ECHO F-UP/LMTD STD: CPT | Performed by: INTERNAL MEDICINE

## 2020-09-02 PROCEDURE — 99232 SBSQ HOSP IP/OBS MODERATE 35: CPT | Performed by: INTERNAL MEDICINE

## 2020-09-02 RX ORDER — CALCIUM ACETATE 667 MG/1
667 CAPSULE ORAL
Status: DISCONTINUED | OUTPATIENT
Start: 2020-09-02 | End: 2020-09-28 | Stop reason: HOSPADM

## 2020-09-02 RX ORDER — ALPRAZOLAM 0.25 MG/1
1 TABLET ORAL EVERY 4 HOURS PRN
Status: DISCONTINUED | OUTPATIENT
Start: 2020-09-02 | End: 2020-09-11

## 2020-09-02 RX ADMIN — MORPHINE SULFATE 2 MG: 2 INJECTION, SOLUTION INTRAMUSCULAR; INTRAVENOUS at 20:23

## 2020-09-02 RX ADMIN — METHOCARBAMOL TABLETS 750 MG: 750 TABLET, COATED ORAL at 17:28

## 2020-09-02 RX ADMIN — Medication 250 MG: at 08:59

## 2020-09-02 RX ADMIN — DULOXETINE HYDROCHLORIDE 60 MG: 60 CAPSULE, DELAYED RELEASE ORAL at 08:58

## 2020-09-02 RX ADMIN — OXYCODONE HYDROCHLORIDE 15 MG: 10 TABLET ORAL at 05:36

## 2020-09-02 RX ADMIN — GABAPENTIN 100 MG: 100 CAPSULE ORAL at 17:28

## 2020-09-02 RX ADMIN — GABAPENTIN 100 MG: 100 CAPSULE ORAL at 08:59

## 2020-09-02 RX ADMIN — DICYCLOMINE HYDROCHLORIDE 10 MG: 10 CAPSULE ORAL at 10:11

## 2020-09-02 RX ADMIN — IBUPROFEN 600 MG: 600 TABLET ORAL at 21:40

## 2020-09-02 RX ADMIN — OXYCODONE HYDROCHLORIDE 15 MG: 10 TABLET ORAL at 17:29

## 2020-09-02 RX ADMIN — IBUPROFEN 600 MG: 600 TABLET ORAL at 14:20

## 2020-09-02 RX ADMIN — CEFAZOLIN SODIUM 2000 MG: 2 SOLUTION INTRAVENOUS at 14:22

## 2020-09-02 RX ADMIN — DICYCLOMINE HYDROCHLORIDE 10 MG: 10 CAPSULE ORAL at 20:23

## 2020-09-02 RX ADMIN — MELATONIN 6 MG: at 21:41

## 2020-09-02 RX ADMIN — CEFAZOLIN SODIUM 2000 MG: 2 SOLUTION INTRAVENOUS at 05:36

## 2020-09-02 RX ADMIN — CALCIUM ACETATE 667 MG: 667 CAPSULE ORAL at 17:28

## 2020-09-02 RX ADMIN — NYSTATIN: 100000 CREAM TOPICAL at 09:05

## 2020-09-02 RX ADMIN — PANTOPRAZOLE SODIUM 20 MG: 20 TABLET, DELAYED RELEASE ORAL at 05:36

## 2020-09-02 RX ADMIN — IBUPROFEN 600 MG: 600 TABLET ORAL at 05:36

## 2020-09-02 RX ADMIN — OXYCODONE HYDROCHLORIDE 15 MG: 10 TABLET ORAL at 11:55

## 2020-09-02 RX ADMIN — CEFAZOLIN SODIUM 2000 MG: 2 SOLUTION INTRAVENOUS at 21:40

## 2020-09-02 RX ADMIN — MORPHINE SULFATE 2 MG: 2 INJECTION, SOLUTION INTRAMUSCULAR; INTRAVENOUS at 14:21

## 2020-09-02 RX ADMIN — CALCIUM ACETATE 667 MG: 667 CAPSULE ORAL at 11:55

## 2020-09-02 RX ADMIN — LORAZEPAM 2 MG: 2 INJECTION INTRAMUSCULAR; INTRAVENOUS at 09:00

## 2020-09-02 RX ADMIN — NYSTATIN: 100000 CREAM TOPICAL at 17:30

## 2020-09-02 RX ADMIN — METHOCARBAMOL TABLETS 750 MG: 750 TABLET, COATED ORAL at 11:55

## 2020-09-02 RX ADMIN — GABAPENTIN 100 MG: 100 CAPSULE ORAL at 21:40

## 2020-09-02 RX ADMIN — ENOXAPARIN SODIUM 60 MG: 60 INJECTION SUBCUTANEOUS at 21:39

## 2020-09-02 RX ADMIN — ENOXAPARIN SODIUM 60 MG: 60 INJECTION SUBCUTANEOUS at 08:58

## 2020-09-02 RX ADMIN — OLANZAPINE 5 MG: 5 TABLET, FILM COATED ORAL at 21:40

## 2020-09-02 RX ADMIN — METHOCARBAMOL TABLETS 750 MG: 750 TABLET, COATED ORAL at 05:36

## 2020-09-02 RX ADMIN — MIRTAZAPINE 30 MG: 30 TABLET, FILM COATED ORAL at 21:40

## 2020-09-02 RX ADMIN — SENNOSIDES 8.6 MG: 8.6 TABLET ORAL at 08:57

## 2020-09-02 RX ADMIN — Medication 250 MG: at 17:28

## 2020-09-02 RX ADMIN — ALPRAZOLAM 1 MG: 0.5 TABLET ORAL at 21:40

## 2020-09-02 NOTE — ASSESSMENT & PLAN NOTE
· Patient 8/27 agreed to psych eval which is apprecaited  · Psych added Zyprexa  · C/w Cymbalta and Remeron  · So far has been on p r n  IV Ativan  She states that it is not working for her    Getting panic attacks after passing away of her boyfriend about 2 days ago  · She discuss this was with Pain Service recommends changing her from IV Ativan to oral Xanax 1 mg q 4 hours p r n   Ordered that

## 2020-09-02 NOTE — ASSESSMENT & PLAN NOTE
· Patient noted to have tricuspid while vegetation on echocardiogram done in July  Patient had a transthoracic and also transesophageal echocardiogram done during the last hospital stay  · With septic emboli to lungs and posterior iliacus muscle  · Patient is transferred over here to be evaluated by CT surgery  · She was noncommittal to abstain from illegal drugs or unintended use of medications  · When she continues to abstain and completes iv abx treatment they would consider surgical correction - this will likely be as OP   · Continue IV cefazolin as above  · repeat echo today per ID - continues to show to severe TR, 14 x 8 mm mobile vegetation on tricuspid valve    No pericardial effusion

## 2020-09-02 NOTE — PROGRESS NOTES
Progress Note - Infectious Disease   Brit Gaitan 34 y o  female MRN: [id number]  Unit/Bed#: [facility] 526-01 Encounter: [id number]      Impression/Plan:  1  Recurrent/persistent MSSA bacteremia with TV infective endocarditis   Blood cultures from 8/10 remain positive   Prolonged course of IV antibiotic was previously recommended, but patient has left AMA on 2 prior occasions (once at [facility] and once from [facility])    She remains hemodynamically stable despite her ongoing bacteremia   PICC culture negative so no need to change PICC   the bacteremia has cleared  -continue cefazolin through 9/26/2020 to complete 6 weeks from the 1st negative blood culture  -recheck echocardiogram  today  -recheck CBC with diff and CMP weekly while on the IV antibiotics     2  Tricuspid valve endocarditis, 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  7/21 MELISSA showed large vegetation on TV with severe regurgitation  7/15 CT abdomen/pelvis also showed bilateral renal parenchymal abnormalities concerning for septic emboli   No intra-abdominal abscess   Patient now having some pleuritic-type chest pain     -antibiotic plan as above  -recheck echocardiogram today  -CT surgery follow-up     3   Right iliacus muscle abscesses/sacroiliitis-repeat imaging without remaining abscess   MRI now with evidence of sacroiliitis that I suspect is septic   There are no destructive changes or fluid collection   Suspect this is the cause of the patient's ongoing pain   The patient's sedimentation rate and CRP or decreased  -antibiotics as above  -recheck sedimentation rate and CRP weekly while on the IV antibiotics  -pain management  -no clear indication for any surgical intervention for now     4  Recent left foot cellulitis with abscess   Likely site of injection of IV drugs versus ectopic foci in the setting of MSSA bacteremia   This appears to have healed with no evidence of active infection   -antibiotic plan as above      5  Back pain   More likely secondary to chronic pain, opioid dependence   20 Thoracic and lumbar spine MRIs performed at [facility] [place] [facility] negative were for abscess or osteomyelitis   CT scan nondiagnostic for osteomyelitis or diskitis   Sedimentation rate and CRP are decreasing  -monitor back pain  -no repeat MRI lumbar spine for now  -serial exams  -acute pain service follow-up     6  Active IV heroin abuse   This is the causative factor for development of bacteremia and infective endocarditis   Patient has left AMA on prior occasions   HIV screen negative  -patient is not a candidate for at home PICC line/IV antibiotics  -acute pain service follow-up     7  Chronic HCV infection, transaminitis   Recent HIV was negative   The LFTs are waxing waning  -monitor LFTs      8  Left upper extremity DVT-in the setting of PICC line use   Patient now on anticoagulation  -vascular follow-up  -serial exam  -anticoagulation    Discussed the above management plan with the primary service    Antibiotics:  Cefazolin restart 24  Negative blood cultures 18    Subjective:  Patient has no fever, chills, sweats; no nausea, vomiting, diarrhea; no cough, shortness of breath; no increased pain  No new symptoms  She seems in better spirits today  Objective:  Vitals:  Temp:  [97 2 °F (36 2 °C)-97 8 °F (36 6 °C)] 97 8 °F (36 6 °C)  HR:  [] 96  Resp:  [18] 18  BP: (113-120)/(56-64) 115/56  SpO2:  [97 %-98 %] 98 %  Temp (24hrs), Av 5 °F (36 4 °C), Min:97 2 °F (36 2 °C), Max:97 8 °F (36 6 °C)  Current: Temperature: 97 8 °F (36 6 °C)    Physical Exam:   General Appearance:  Alert, interactive, nontoxic, no acute distress  Throat: Oropharynx moist without lesions  Lungs:   Clear to auscultation bilaterally; no wheezes, rhonchi or rales; respirations unlabored   Heart:  RRR; no murmur, rub or gallop   Abdomen:   Soft, non-tender, non-distended, positive bowel sounds       Extremities: No clubbing, cyanosis or edema   Skin: No new rashes or lesions  No draining wounds noted  Labs, Imaging, & Other studies:   All pertinent labs and imaging studies were personally reviewed  Results from last 7 days   Lab Units 08/31/20  0541 08/28/20  0618   WBC Thousand/uL 5 06 5 10   HEMOGLOBIN g/dL 9 1* 8 4*   PLATELETS Thousands/uL 291 355     Results from last 7 days   Lab Units 09/01/20  2142 08/31/20  0541 08/28/20  0618   SODIUM mmol/L 139 138 138   POTASSIUM mmol/L 4 0 3 9 3 8   CHLORIDE mmol/L 104 103 105   CO2 mmol/L 29 30 29   BUN mg/dL 14 11 8   CREATININE mg/dL 0 54* 0 44* 0 57*   EGFR ml/min/1 73sq m 130 139 127   CALCIUM mg/dL 8 7 8 7 8 4   AST U/L 147* 147* 40   ALT U/L 72 56 19   ALK PHOS U/L 284* 229* 161*     Results from last 7 days   Lab Units 08/26/20  1615   BLOOD CULTURE  No Growth After 5 Days           Results from last 7 days   Lab Units 08/31/20  0540   CRP mg/L 14 3*

## 2020-09-02 NOTE — PROGRESS NOTES
Progress Note - Brit Gaitan 1990, 34 y o  female MRN: [id number]    Unit/Bed#: [facility] 526-01 Encounter: [id number]    Primary Care Provider: Shruthi Mccray PA-C   Date and time admitted to hospital: 8/12/2020  7:39 PM        Transaminitis  Assessment & Plan  Check acute hep panel - pt reports hx of Hep C  Positive for hep C antibodies  AST and ALP continue to be up and down    DVT of axillary vein, acute left [facility])  Assessment & Plan  Increase lovenox to 60mg Q12h  Due to severe pain consulted with vascular surgery for treatment options - appreciated  Picc continues to function well and post recent blood culture is negative  Elevation of LUE  PICC team attempted to move picc line to right arm given left arm DVT pain  Unsuccessful  IR consulted   IR exchanged PICC 8/26  Needs 3 months of AC - can d/c on NOAC if affordable    Intravenous drug abuse [facility])  Assessment & Plan  · Patient with history of drug abuse and likely the source of bacteremia  · Patient has a history of signing against medical advise  · May benefit from drug rehab eventually if she becomes agreeable to host services  She was not interested during my conversation  · During the hospital stay in July of this year patient was positive for [facility] ,cocaine and opiates  Although pt previosuly reported to Dr Nicolas Blackman that she had not used any drugs for several years now, but admitted to Dr Rupa Hearn that she was actively abusing cocaine and heroin    Bipolar 1 disorder [facility])  Assessment & Plan  · Patient 8/27 agreed to psych eval which is apprecaited  · Psych added Zyprexa  · C/w Cymbalta and Remeron  · So far has been on p r n  IV Ativan  She states that it is not working for her    Getting panic attacks after passing away of her boyfriend about 2 days ago  · She discuss this was with Pain Service recommends changing her from IV Ativan to oral Xanax 1 mg q 4 hours p r n   Ordered that    Septic embolism ([facility] [address] )  Assessment & Plan  · Patient noted to have abnormalities seen in the CT of the chest abdomen and pelvis concerning for septic emboli  · There is pleural effusion on the CT scan of the chest and also on repeat chest x-ray  · Patient is rather asymptomatic from pulmonary standpoint  · Continue with the antibiotics  · Thoracic surgery have evaluated the pt and she is not short of breath   · 8/15-imaging right hip shows no osseous involvement; concern for proximity of abscess status SI joint, but no SI joint involvement at this time  · Management as above under hip pain  · c/o pleuritic CP  CXR shows no acute finding  Suspect pain could be related to septic emboli  No pain today      Acute bacterial endocarditis  Assessment & Plan  · Patient noted to have tricuspid while vegetation on echocardiogram done in July  Patient had a transthoracic and also transesophageal echocardiogram done during the last hospital stay  · With septic emboli to lungs and posterior iliacus muscle  · Patient is transferred over here to be evaluated by CT surgery  · She was noncommittal to abstain from illegal drugs or unintended use of medications  · When she continues to abstain and completes iv abx treatment they would consider surgical correction - this will likely be as OP   · Continue IV cefazolin as above  · repeat echo today per ID - continues to show to severe TR, 14 x 8 mm mobile vegetation on tricuspid valve  No pericardial effusion    * Bacteremia due to Staphylococcus aureus  Assessment & Plan  · Recurrent MSSA bacteremia  · Initially admitted to [facility] from 07/15-blood culture were positive for MSSA bacteremia, but patient signed AMA on 07/22  Her repeat blood culture done on 7/21 was negative after 5 days  · [facility] on 07/30-blood culture came back positive for MSSA and she left again is medical advice on 08/04  Her blood cultures came back positive on 07/30, but the bacteremia cleared as of 8 /2  Patient left AMA on   · Readmitted to [address] [place] [address] on 08/10-blood culture came back positive for Staph aureus  · 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 during the last hospital stay from 7/15-  · Plan is to continue IV cefazolin for a 6 week course per ID recommendations from 8/15 when B Cx became neg till   ·  removed PICC and PICC cx neg  · B Cx from  neg        [facility] Internal Medicine Progress Note  Patient: Eduar Johnson 34 y o  female   MRN: [id number]  PCP: Meliza Mcfadden PA-C  [facility] Encounter: [id number]  Date Of Visit: 20    Assessment:    Principal Problem:    Bacteremia due to Staphylococcus aureus  Active Problems:    Acute bacterial endocarditis    Septic embolism (HCC)    Bipolar 1 disorder ([facility] [address] )    Right hip pain    Intravenous drug abuse ([facility] [address] )    DVT of axillary vein, acute left ([facility] [address] )    Transaminitis      Plan:         VTE Pharmacologic Prophylaxis:   Pharmacologic: Enoxaparin (Lovenox)  Mechanical VTE Prophylaxis in Place: Yes    Patient Centered Rounds: I have performed bedside rounds with nursing staff today  Discussions with Specialists or Other Care Team Provider: APS    Education and Discussions with Family / Patient:  Patient    Time Spent for Care: 30 minutes  More than 50% of total time spent on counseling and coordination of care as described above      Current Length of Stay: 21 day(s)    Current Patient Status: Inpatient   Certification Statement: The patient will continue to require additional inpatient hospital stay due to IV antibiotic    Discharge Plan / Estimated Discharge Date: 20    Code Status: Level 1 - Full Code      Subjective:   Appeared well initially but later on kept crying intermittently during conversation while talking about her boyfriend who passed away about 2-3 days ago    Objective:     Vitals:   Temp (24hrs), Av 5 °F (36 4 °C), Min:97 1 °F (36 2 °C), Max:97 8 °F (36 6 °C)    Temp:  [97 1 °F (36 2 °C)-97 8 °F (36 6 °C)] 97 1 °F (36 2 °C)  HR:  [] 101  Resp:  [18] 18  BP: (115-122)/(56-69) 122/69  SpO2:  [98 %] 98 %  Body mass index is 23 3 kg/m²  Input and Output Summary (last 24 hours): Intake/Output Summary (Last 24 hours) at 9/2/2020 1712  Last data filed at 9/2/2020 1541  Gross per 24 hour   Intake 1381 ml   Output 2450 ml   Net -1069 ml       Physical Exam:     Physical Exam  Vitals signs reviewed  HENT:      Head: Normocephalic and atraumatic  Eyes:      Conjunctiva/sclera: Conjunctivae normal    Cardiovascular:      Rate and Rhythm: Normal rate and regular rhythm  Heart sounds: Murmur present  Pulmonary:      Effort: Pulmonary effort is normal  No respiratory distress  Breath sounds: Normal breath sounds  No wheezing  Abdominal:      General: There is no distension  Palpations: Abdomen is soft  Tenderness: There is no abdominal tenderness  Neurological:      Mental Status: She is alert and oriented to person, place, and time  Additional Data:     Labs:    Results from last 7 days   Lab Units 08/31/20  0541   WBC Thousand/uL 5 06   HEMOGLOBIN g/dL 9 1*   HEMATOCRIT % 28 6*   PLATELETS Thousands/uL 291   NEUTROS PCT % 39*   LYMPHS PCT % 41   MONOS PCT % 10   EOS PCT % 8*     Results from last 7 days   Lab Units 09/01/20  2142   POTASSIUM mmol/L 4 0   CHLORIDE mmol/L 104   CO2 mmol/L 29   BUN mg/dL 14   CREATININE mg/dL 0 54*   CALCIUM mg/dL 8 7   ALK PHOS U/L 284*   ALT U/L 72   AST U/L 147*           * I Have Reviewed All Lab Data Listed Above  * Additional Pertinent Lab Tests Reviewed:  All Labs Within Last 24 Hours Reviewed    Imaging:    Imaging Reports Reviewed Today Include:   Imaging Personally Reviewed by Myself Includes:     Recent Cultures (last 7 days):           Last 24 Hours Medication List:   Current Facility-Administered Medications   Medication Dose Route Frequency Provider Last Rate    acetaminophen  650 mg Oral Q6H PRN Wilman Nipper, DO      ALPRAZolam  1 mg Oral Q4H PRN Elo Gillespie MD      alteplase  2 mg Intracatheter Q12H PRN Wilman Nipper, DO      calcium acetate  667 mg Oral TID With Meals Elo Gillespie MD      calcium carbonate  1,000 mg Oral Daily PRN Trevon Ventura MD      cefazolin  2,000 mg Intravenous Q8H Asha Roca PA-C 2,000 mg (09/02/20 1422)    dicyclomine  10 mg Oral TID PRN Elo Gillespie MD      DULoxetine  60 mg Oral Daily Wilman Nipper, DO      enoxaparin  1 mg/kg Subcutaneous Q12H [address] Sasha Calhoun MD      gabapentin  100 mg Oral TID Wilman Nipper, DO      Followed by   Wanda Calloway ON 9/4/2020] gabapentin  100 mg Oral BID Wilman Nipper, DO      Followed by   Wanda Calloway ON 9/9/2020] gabapentin  100 mg Oral HS Wilman Nipper, DO      hydrocortisone   Topical 4x Daily PRN Wilman Nipper, DO      ibuprofen  600 mg Oral [facility] Wilman Nipper, DO      iohexol  50 mL Oral 90 min pre-procedure Hetul Reyna, DO      lidocaine  2 patch Topical Daily Asha Roca PA-C      magnesium citrate  296 mL Oral Daily PRN Jessica Cohen MD      melatonin  6 mg Oral HS PRN Asha Roca PA-C      methocarbamol  750 mg Oral Q6H [address] Sasha Calhoun MD      methylnaltrexone bromide  12 mg Subcutaneous Q48H PRN Sasha Calhoun MD      mirtazapine  30 mg Oral HS Trevonkristin Ventura MD      morphine injection  2 mg Intravenous Q3H PRN Wilman Nipper, DO      nystatin   Topical BID Wilman Nipper, DO      OLANZapine  5 mg Oral HS Catrina Gregg MD      ondansetron  4 mg Intravenous Q6H PRN Trevon Ventura MD      oxyCODONE  10 mg Oral Q4H PRN BOO Hubbard      oxyCODONE  15 mg Oral Q4H PRN BOO Hubbard      pantoprazole  20 mg Oral Early Morning Wilman Nipper, DO      saccharomyces boulardii  250 mg Oral BID Jessica Cohen MD      senna  1 tablet Oral BID Jessica Cohen MD          Today, Patient Was Seen By: Elo Gillespie MD    ** Please Note: This note has been constructed using a voice recognition system   **

## 2020-09-02 NOTE — ASSESSMENT & PLAN NOTE
Check acute hep panel - pt reports hx of Hep C   Positive for hep C antibodies  AST and ALP continue to be up and down

## 2020-09-02 NOTE — ASSESSMENT & PLAN NOTE
· Patient with history of drug abuse and likely the source of bacteremia  · Patient has a history of signing against medical advise  · May benefit from drug rehab eventually if she becomes agreeable to host services  She was not interested during my conversation  · During the hospital stay in July of this year patient was positive for [facility] ,cocaine and opiates    Although pt previosuly reported to Dr Rosario Lee that she had not used any drugs for several years now, but admitted to Dr Rowdy Cordero that she was actively abusing cocaine and heroin

## 2020-09-02 NOTE — UTILIZATION REVIEW
Continued Stay Review    Date: 9-2-20                       Current Patient Class: inpatient  Current Level of Care: med surg    HPI:29 y o  female initially admitted on  Right hip pain, iv drug abuse, septic embolism  Assessment/Plan:      Opoid, benzo, and neurontin taper in progress  Plan for non narcotic pain management including cymbalta, robaxin, ibuprofen and tylenol  Infectious disease plans  to recheck echo today, continue iv antibiotics through 9-26-20  Pertinent Labs/Diagnostic Results:     ECHO  9-2-20   LEFT VENTRICLE:  Systolic function was normal  Ejection fraction was estimated to be 55 %  There were no regional wall motion abnormalities      RIGHT VENTRICLE:  The ventricle was mildly dilated  Systolic function was normal   Wall thickness was increased      RIGHT ATRIUM:  The atrium was mildly dilated      TRICUSPID VALVE:  There was severe regurgitation  There was a medium-sized, pedunculated, echogenic, fixed vegetation, measuring 14 mm x 8 mm on the anterior leaflet on the right atrial aspect      COMPARISONS:  Comparison was made with the previous study of 19-Jul-2020  Right ventricle size has increased      HISTORY: PRIOR HISTORY: Bateremia, Staph Aureus Endocarditis, Septic Emboli, DVT, Pleural Effusion, PFO     PROCEDURE: The procedure was performed at the bedside  This was a routine study  The transthoracic approach was used  The study included limited 2D imaging, limited spectral Doppler, and color Doppler  The heart rate was 104 bpm, at the  start of the study  Images were obtained from the parasternal, apical, and subcostal acoustic windows  Image quality was adequate      LEFT VENTRICLE: Size was normal  Systolic function was normal  Ejection fraction was estimated to be 55 %  There were no regional wall motion abnormalities  Wall thickness was normal  DOPPLER: Left ventricular diastolic function not  assessed     VENTRICULAR SEPTUM: There was systolic flattening   These changes are consistent with RV pressure overload      RIGHT VENTRICLE: The ventricle was mildly dilated  Systolic function was normal  Wall thickness was increased      LEFT ATRIUM: Size was normal      RIGHT ATRIUM: The atrium was mildly dilated  A PICC line was present in the superior right atrial cavity      MITRAL VALVE: Valve structure was normal  There was normal leaflet separation  DOPPLER: The transmitral velocity was within the normal range  There was no evidence for stenosis  There was no regurgitation      AORTIC VALVE: The valve was trileaflet  Leaflets exhibited normal thickness and normal cuspal separation  DOPPLER: Transaortic velocity was within the normal range  There was no evidence for stenosis  There was no regurgitation      TRICUSPID VALVE: There was a medium-sized, pedunculated, echogenic, fixed vegetation, measuring 14 mm x 8 mm on the anterior leaflet on the right atrial aspect  DOPPLER: There was severe regurgitation  The regurgitant jet was toward the  septum  Pulmonary artery systolic pressure was at the upper limits of normal  Estimated peak PA pressure was 35 mmHg      PULMONIC VALVE: Leaflets exhibited normal thickness, no calcification, and normal cuspal separation  DOPPLER: The transpulmonic velocity was within the normal range   There was no regurgitation      PERICARDIUM: There was no pericardial effusion      AORTA: The root exhibited normal size            Results from last 7 days   Lab Units 08/31/20  0541 08/28/20  0618   WBC Thousand/uL 5 06 5 10   HEMOGLOBIN g/dL 9 1* 8 4*   HEMATOCRIT % 28 6* 27 5*   PLATELETS Thousands/uL 291 355   NEUTROS ABS Thousands/µL 1 99  --          Results from last 7 days   Lab Units 09/01/20  2142 08/31/20  0541 08/31/20  0540 08/28/20  0618   SODIUM mmol/L 139 138  --  138   POTASSIUM mmol/L 4 0 3 9  --  3 8   CHLORIDE mmol/L 104 103  --  105   CO2 mmol/L 29 30  --  29   ANION GAP mmol/L 6 5  --  4   BUN mg/dL 14 11  --  8   CREATININE mg/dL 0 54* 0 44*  --  0 57*   EGFR ml/min/1 73sq m 130 139  --  127   CALCIUM mg/dL 8 7 8 7  --  8 4   MAGNESIUM mg/dL  --   --  2 0 2 0   PHOSPHORUS mg/dL 5 5*  --  5 8* 5 4*     Results from last 7 days   Lab Units 09/01/20 2142 08/31/20  0541 08/28/20  0618   AST U/L 147* 147* 40   ALT U/L 72 56 19   ALK PHOS U/L 284* 229* 161*   TOTAL PROTEIN g/dL 7 7 7 3 7 6   ALBUMIN g/dL 2 8* 2 6* 2 4*   TOTAL BILIRUBIN mg/dL 0 28 0 28 0 21         Results from last 7 days   Lab Units 09/01/20 2142 08/31/20  0541 08/28/20  0618   GLUCOSE RANDOM mg/dL 109 98 108       Results from last 7 days   Lab Units 09/01/20 2142   HEP B S AG  Non-reactive   HEP C AB  High Reactive*   HEP B C IGM  Non-reactive         Results from last 7 days   Lab Units 08/31/20  0540   CRP mg/L 14 3*   SED RATE mm/hour 37*       Results from last 7 days   Lab Units 08/26/20  1615   BLOOD CULTURE  No Growth After 5 Days                 Vital Signs:    Vitals:    09/01/20 1350 09/01/20 2316 09/02/20 0700 09/02/20 1506   BP: 113/64 120/64 115/56 122/69   BP Location:   Right arm Right arm   Pulse: 102 100 96 101   Resp: 18 18 18 18   Temp: (!) 97 2 °F (36 2 °C) 97 5 °F (36 4 °C) 97 8 °F (36 6 °C) (!) 97 1 °F (36 2 °C)   TempSrc: Oral Oral Oral Oral   SpO2: 97% 98% 98% 98%   Weight:       Height:         Medications:   Scheduled Medications:    calcium acetate, 667 mg, Oral, TID With Meals  cefazolin, 2,000 mg, Intravenous, Q8H  DULoxetine, 60 mg, Oral, Daily  enoxaparin, 1 mg/kg, Subcutaneous, Q12H JEFF  gabapentin, 100 mg, Oral, TID    Followed by  Lindwood Floss ON 9/4/2020] gabapentin, 100 mg, Oral, BID    Followed by  Lindwood Floss ON 9/9/2020] gabapentin, 100 mg, Oral, HS  ibuprofen, 600 mg, Oral, Q8H JEFF  iohexol, 50 mL, Oral, 90 min pre-procedure  lidocaine, 2 patch, Topical, Daily  methocarbamol, 750 mg, Oral, Q6H JEFF  mirtazapine, 30 mg, Oral, HS  nystatin, , Topical, BID  OLANZapine, 5 mg, Oral, HS  pantoprazole, 20 mg, Oral, Early Morning  saccharomyces boulardii, 250 mg, Oral, BID  senna, 1 tablet, Oral, BID      Continuous IV Infusions:     PRN Meds:  acetaminophen, 650 mg, Oral, Q6H PRN  alteplase, 2 mg, Intracatheter, Q12H PRN  calcium carbonate, 1,000 mg, Oral, Daily PRN  dicyclomine, 10 mg, Oral, TID PRN  hydrocortisone, , Topical, 4x Daily PRN  LORazepam, 2 mg, Intravenous, Q3H PRN  magnesium citrate, 296 mL, Oral, Daily PRN  melatonin, 6 mg, Oral, HS PRN  methylnaltrexone bromide, 12 mg, Subcutaneous, Q48H PRN  morphine injection, 2 mg, Intravenous, Q3H PRN  ondansetron, 4 mg, Intravenous, Q6H PRN  oxyCODONE, 10 mg, Oral, Q4H PRN  oxyCODONE, 15 mg, Oral, Q4H PRN        Discharge Plan: To be determined     Network Utilization Review Department  [email or web address]  ATTENTION: Please call with any questions or concerns to [phone] and carefully listen to the prompts so that you are directed to the right person  All voicemails are confidential   Charles Saavedra all requests for admission clinical reviews, approved or denied determinations and any other requests to dedicated fax number below belonging to the campus where the patient is receiving treatment   List of dedicated fax numbers for the Facilities:  [address] [place] [address] DENIALS (Administrative/Medical Necessity) [phone]   [address]  (Maternity/NICU/Pediatrics) [phone]   Hemal Boots [phone]   [address] [facility] [phone]   [facility] [phone]   Kitchen Juliann [phone]   [address] [facility] [address] [place] [address] [phone]   [facility]  [phone]   [address] [facility] [address]  [address] [facility] [address] [facility] [phone]

## 2020-09-02 NOTE — ASSESSMENT & PLAN NOTE
· Recurrent MSSA bacteremia  · Initially admitted to [facility] from 07/15-blood culture were positive for MSSA bacteremia, but patient signed AMA on 07/22  Her repeat blood culture done on 7/21 was negative after 5 days  · [facility] on 07/30-blood culture came back positive for MSSA and she left again is medical advice on 08/04  Her blood cultures came back positive on 07/30, but the bacteremia cleared as of 8 /2  Patient left AMA on 08/04  · Readmitted to [address] [place] [address] on 08/10-blood culture came back positive for Staph aureus  · 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

## 2020-09-02 NOTE — RESTORATIVE TECHNICIAN NOTE
Restorative Specialist Mobility Note       Activity: Ambulate in [place]     Assistive Device: Front wheel walker        Repositioned: Supine

## 2020-09-02 NOTE — PLAN OF CARE
Problem: Prexisting or High Potential for Compromised Skin Integrity  Goal: Skin integrity is maintained or improved  Description: INTERVENTIONS:  - Identify patients at risk for skin breakdown  - Assess and monitor skin integrity  - Assess and monitor nutrition and hydration status  - Monitor labs   - Assess for incontinence   - Turn and reposition patient  - Assist with mobility/ambulation  - Relieve pressure over bony prominences  - Avoid friction and shearing  - Provide appropriate hygiene as needed including keeping skin clean and dry  - Evaluate need for skin moisturizer/barrier cream  - Collaborate with interdisciplinary team   - Patient/family teaching  - Consider wound care consult   Outcome: Progressing     Problem: PAIN - ADULT  Goal: Verbalizes/displays adequate comfort level or baseline comfort level  Description: Interventions:  - Encourage patient to monitor pain and request assistance  - Assess pain using appropriate pain scale  - Administer analgesics based on type and severity of pain and evaluate response  - Implement non-pharmacological measures as appropriate and evaluate response  - Consider cultural and social influences on pain and pain management  - Notify physician/advanced practitioner if interventions unsuccessful or patient reports new pain  Outcome: Progressing     Problem: INFECTION - ADULT  Goal: Absence or prevention of progression during hospitalization  Description: INTERVENTIONS:  - Assess and monitor for signs and symptoms of infection  - Monitor lab/diagnostic results  - Monitor all insertion sites, i e  indwelling lines, tubes, and drains  - Monitor endotracheal if appropriate and nasal secretions for changes in amount and color  - [place] appropriate cooling/warming therapies per order  - Administer medications as ordered  - Instruct and encourage patient and family to use good hand hygiene technique  - Identify and instruct in appropriate isolation precautions for identified infection/condition  Outcome: Progressing  Goal: Absence of fever/infection during neutropenic period  Description: INTERVENTIONS:  - Monitor WBC    Outcome: Progressing     Problem: SAFETY ADULT  Goal: Patient will remain free of falls  Description: INTERVENTIONS:  - Assess patient frequently for physical needs  -  Identify cognitive and physical deficits and behaviors that affect risk of falls    -  [place] fall precautions as indicated by assessment   - Educate patient/family on patient safety including physical limitations  - Instruct patient to call for assistance with activity based on assessment  - Modify environment to reduce risk of injury  - Consider OT/PT consult to assist with strengthening/mobility  Outcome: Progressing  Goal: Maintain or return to baseline ADL function  Description: INTERVENTIONS:  -  Assess patient's ability to carry out ADLs; assess patient's baseline for ADL function and identify physical deficits which impact ability to perform ADLs (bathing, care of mouth/teeth, toileting, grooming, dressing, etc )  - Assess/evaluate cause of self-care deficits   - Assess range of motion  - Assess patient's mobility; develop plan if impaired  - Assess patient's need for assistive devices and provide as appropriate  - Encourage maximum independence but intervene and supervise when necessary  - Involve family in performance of ADLs  - Assess for home care needs following discharge   - Consider OT consult to assist with ADL evaluation and planning for discharge  - Provide patient education as appropriate  Outcome: Progressing  Goal: Maintain or return mobility status to optimal level  Description: INTERVENTIONS:  - Assess patient's baseline mobility status (ambulation, transfers, stairs, etc )    - Identify cognitive and physical deficits and behaviors that affect mobility  - Identify mobility aids required to assist with transfers and/or ambulation (gait belt, sit-to-stand, lift, walker, cane, etc )  - [place] fall precautions as indicated by assessment  - Record patient progress and toleration of activity level on Mobility SBAR; progress patient to next Phase/Stage  - Instruct patient to call for assistance with activity based on assessment  - Consider rehabilitation consult to assist with strengthening/weightbearing, etc   Outcome: Progressing     Problem: CARDIOVASCULAR - ADULT  Goal: Maintains optimal cardiac output and hemodynamic stability  Description: INTERVENTIONS:  - Monitor I/O, vital signs and rhythm  - Monitor for S/S and trends of decreased cardiac output  - Administer and titrate ordered vasoactive medications to optimize hemodynamic stability  - Assess quality of pulses, skin color and temperature  - Assess for signs of decreased coronary artery perfusion  - Instruct patient to report change in severity of symptoms  Outcome: Progressing  Goal: Absence of cardiac dysrhythmias or at baseline rhythm  Description: INTERVENTIONS:  - Continuous cardiac monitoring, vital signs, obtain 12 lead EKG if ordered  - Administer antiarrhythmic and heart rate control medications as ordered  - Monitor electrolytes and administer replacement therapy as ordered  Outcome: Progressing     Problem: METABOLIC, FLUID AND ELECTROLYTES - ADULT  Goal: Electrolytes maintained within normal limits  Description: INTERVENTIONS:  - Monitor labs and assess patient for signs and symptoms of electrolyte imbalances  - Administer electrolyte replacement as ordered  - Monitor response to electrolyte replacements, including repeat lab results as appropriate  - Instruct patient on fluid and nutrition as appropriate  Outcome: Progressing  Goal: Fluid balance maintained  Description: INTERVENTIONS:  - Monitor labs   - Monitor I/O and WT  - Instruct patient on fluid and nutrition as appropriate  - Assess for signs & symptoms of volume excess or deficit  Outcome: Progressing  Goal: Glucose maintained within target range  Description: INTERVENTIONS:  - Monitor Blood Glucose as ordered  - Assess for signs and symptoms of hyperglycemia and hypoglycemia  - Administer ordered medications to maintain glucose within target range  - Assess nutritional intake and initiate nutrition service referral as needed  Outcome: Progressing     Problem: HEMATOLOGIC - ADULT  Goal: Maintains hematologic stability  Description: INTERVENTIONS  - Assess for signs and symptoms of bleeding or hemorrhage  - Monitor labs  - Administer supportive blood products/factors as ordered and appropriate  Outcome: Progressing     Problem: Potential for Falls  Goal: Patient will remain free of falls  Description: INTERVENTIONS:  - Assess patient frequently for physical needs  -  Identify cognitive and physical deficits and behaviors that affect risk of falls  -  [place] fall precautions as indicated by assessment   - Educate patient/family on patient safety including physical limitations  - Instruct patient to call for assistance with activity based on assessment  - Modify environment to reduce risk of injury  - Consider OT/PT consult to assist with strengthening/mobility  Outcome: Progressing     Problem: Nutrition/Hydration-ADULT  Goal: Nutrient/Hydration intake appropriate for improving, restoring or maintaining nutritional needs  Description: Monitor and assess patient's nutrition/hydration status for malnutrition  Collaborate with interdisciplinary team and initiate plan and interventions as ordered  Monitor patient's weight and dietary intake as ordered or per policy  Utilize nutrition screening tool and intervene as necessary  Determine patient's food preferences and provide high-protein, high-caloric foods as appropriate       INTERVENTIONS:  - Monitor oral intake, urinary output, labs, and treatment plans  - Assess nutrition and hydration status and recommend course of action  - Evaluate amount of meals eaten  - Assist patient with eating if necessary - Allow adequate time for meals  - Recommend/ encourage appropriate diets, oral nutritional supplements, and vitamin/mineral supplements  - Order, calculate, and assess calorie counts as needed  - Recommend, monitor, and adjust tube feedings and TPN/PPN based on assessed needs  - Assess need for intravenous fluids  - Provide specific nutrition/hydration education as appropriate  - Include patient/family/caregiver in decisions related to nutrition  Outcome: Progressing     Problem: Knowledge Deficit  Goal: Patient/family/caregiver demonstrates understanding of disease process, treatment plan, medications, and discharge instructions  Description: Complete learning assessment and assess knowledge base    Interventions:  - Provide teaching at level of understanding  - Provide teaching via preferred learning methods  Outcome: Progressing

## 2020-09-03 PROCEDURE — 99232 SBSQ HOSP IP/OBS MODERATE 35: CPT | Performed by: HOSPITALIST

## 2020-09-03 PROCEDURE — 99233 SBSQ HOSP IP/OBS HIGH 50: CPT | Performed by: PHYSICIAN ASSISTANT

## 2020-09-03 PROCEDURE — 99233 SBSQ HOSP IP/OBS HIGH 50: CPT | Performed by: INTERNAL MEDICINE

## 2020-09-03 RX ADMIN — CALCIUM ACETATE 667 MG: 667 CAPSULE ORAL at 09:06

## 2020-09-03 RX ADMIN — IBUPROFEN 600 MG: 600 TABLET ORAL at 14:05

## 2020-09-03 RX ADMIN — GABAPENTIN 100 MG: 100 CAPSULE ORAL at 09:06

## 2020-09-03 RX ADMIN — OXYCODONE HYDROCHLORIDE 15 MG: 10 TABLET ORAL at 18:24

## 2020-09-03 RX ADMIN — MORPHINE SULFATE 2 MG: 2 INJECTION, SOLUTION INTRAMUSCULAR; INTRAVENOUS at 09:12

## 2020-09-03 RX ADMIN — CALCIUM ACETATE 667 MG: 667 CAPSULE ORAL at 18:21

## 2020-09-03 RX ADMIN — DULOXETINE HYDROCHLORIDE 60 MG: 60 CAPSULE, DELAYED RELEASE ORAL at 09:08

## 2020-09-03 RX ADMIN — ALPRAZOLAM 1 MG: 0.5 TABLET ORAL at 16:28

## 2020-09-03 RX ADMIN — SENNOSIDES 8.6 MG: 8.6 TABLET ORAL at 18:21

## 2020-09-03 RX ADMIN — CEFAZOLIN SODIUM 2000 MG: 2 SOLUTION INTRAVENOUS at 14:04

## 2020-09-03 RX ADMIN — ENOXAPARIN SODIUM 60 MG: 60 INJECTION SUBCUTANEOUS at 09:08

## 2020-09-03 RX ADMIN — IBUPROFEN 600 MG: 600 TABLET ORAL at 05:31

## 2020-09-03 RX ADMIN — NYSTATIN: 100000 CREAM TOPICAL at 09:08

## 2020-09-03 RX ADMIN — GABAPENTIN 100 MG: 100 CAPSULE ORAL at 18:20

## 2020-09-03 RX ADMIN — METHOCARBAMOL TABLETS 750 MG: 750 TABLET, COATED ORAL at 23:17

## 2020-09-03 RX ADMIN — IBUPROFEN 600 MG: 600 TABLET ORAL at 21:14

## 2020-09-03 RX ADMIN — PANTOPRAZOLE SODIUM 20 MG: 20 TABLET, DELAYED RELEASE ORAL at 05:31

## 2020-09-03 RX ADMIN — DICYCLOMINE HYDROCHLORIDE 10 MG: 10 CAPSULE ORAL at 11:51

## 2020-09-03 RX ADMIN — METHOCARBAMOL TABLETS 750 MG: 750 TABLET, COATED ORAL at 18:20

## 2020-09-03 RX ADMIN — OXYCODONE HYDROCHLORIDE 15 MG: 10 TABLET ORAL at 23:17

## 2020-09-03 RX ADMIN — ENOXAPARIN SODIUM 60 MG: 60 INJECTION SUBCUTANEOUS at 21:14

## 2020-09-03 RX ADMIN — MELATONIN 6 MG: at 23:19

## 2020-09-03 RX ADMIN — ALPRAZOLAM 1 MG: 0.5 TABLET ORAL at 11:51

## 2020-09-03 RX ADMIN — MIRTAZAPINE 30 MG: 30 TABLET, FILM COATED ORAL at 21:14

## 2020-09-03 RX ADMIN — OXYCODONE HYDROCHLORIDE 15 MG: 10 TABLET ORAL at 14:13

## 2020-09-03 RX ADMIN — MORPHINE SULFATE 2 MG: 2 INJECTION, SOLUTION INTRAMUSCULAR; INTRAVENOUS at 21:16

## 2020-09-03 RX ADMIN — CEFAZOLIN SODIUM 2000 MG: 2 SOLUTION INTRAVENOUS at 21:13

## 2020-09-03 RX ADMIN — CEFAZOLIN SODIUM 2000 MG: 2 SOLUTION INTRAVENOUS at 05:32

## 2020-09-03 RX ADMIN — CALCIUM ACETATE 667 MG: 667 CAPSULE ORAL at 11:51

## 2020-09-03 RX ADMIN — METHOCARBAMOL TABLETS 750 MG: 750 TABLET, COATED ORAL at 11:51

## 2020-09-03 RX ADMIN — METHOCARBAMOL TABLETS 750 MG: 750 TABLET, COATED ORAL at 00:07

## 2020-09-03 RX ADMIN — OXYCODONE HYDROCHLORIDE 15 MG: 10 TABLET ORAL at 05:31

## 2020-09-03 RX ADMIN — GABAPENTIN 100 MG: 100 CAPSULE ORAL at 21:13

## 2020-09-03 RX ADMIN — Medication 250 MG: at 18:21

## 2020-09-03 RX ADMIN — METHOCARBAMOL TABLETS 750 MG: 750 TABLET, COATED ORAL at 05:31

## 2020-09-03 RX ADMIN — Medication 250 MG: at 09:06

## 2020-09-03 RX ADMIN — OLANZAPINE 5 MG: 5 TABLET, FILM COATED ORAL at 21:14

## 2020-09-03 NOTE — ASSESSMENT & PLAN NOTE
· Recurrent MSSA bacteremia  · Initially admitted to [facility] from 07/15-blood culture were positive for MSSA bacteremia, but patient signed AMA on 07/22  Her repeat blood culture done on 7/21 was negative after 5 days  · [facility] on 07/30-blood culture came back positive for MSSA and she left again is medical advice on 08/04  Her blood cultures came back positive on 07/30, but the bacteremia cleared as of 8 /2  Patient left AMA on 08/04  · Readmitted to [address] [place] [address] on 08/10-blood culture came back positive for Staph aureus  · 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

## 2020-09-03 NOTE — PROGRESS NOTES
Progress Note - Infectious Disease   Yari Olsen 34 y o  female MRN: [id number]  Unit/Bed#: [facility] 526-01 Encounter: [id number]      Impression/Plan:  1  Recurrent/persistent MSSA bacteremia with TV infective endocarditis   Blood cultures from 8/10 remain positive   Prolonged course of IV antibiotic was previously recommended, but patient has left AMA on 2 prior occasions (once at [facility] and once from [facility])    She remains hemodynamically stable despite her ongoing bacteremia   PICC culture negative so no need to change PICC   the bacteremia has cleared  -continue cefazolin through 9/26/2020 to complete 6 weeks from the 1st negative blood culture  -recheck CBC with diff and CMP weekly while on the IV antibiotics     2  Tricuspid valve endocarditis, 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  7/21 MELISSA showed large vegetation on TV with severe regurgitation  7/15 CT abdomen/pelvis also showed bilateral renal parenchymal abnormalities concerning for septic emboli   No intra-abdominal abscess   Patient now having some pleuritic-type chest pain  Repeat echocardiogram with severe tricuspid regurgitation as persisting and with mildly dilated RV     -antibiotic plan as above  -CT surgery re-consult to reassess for possible surgery     3   Right iliacus muscle abscesses/sacroiliitis-repeat imaging without remaining abscess   MRI now with evidence of sacroiliitis that I suspect is septic   There are no destructive changes or fluid collection   Suspect this is the cause of the patient's ongoing pain   The patient's sedimentation rate and CRP or decreased  -antibiotics as above  -recheck sedimentation rate and CRP weekly while on the IV antibiotics  -pain management  -no clear indication for any surgical intervention for now     4  Recent left foot cellulitis with abscess   Likely site of injection of IV drugs versus ectopic foci in the setting of MSSA bacteremia   This appears to have healed with no evidence of active infection   -antibiotic plan as above      5  Back pain   More likely secondary to chronic pain, opioid dependence   20 Thoracic and lumbar spine MRIs performed at [facility] [facility] negative were for abscess or osteomyelitis   CT scan nondiagnostic for osteomyelitis or diskitis   Sedimentation rate and CRP are decreasing  -monitor back pain  -no repeat MRI lumbar spine for now  -serial exams  -acute pain service follow-up     6  Active IV heroin abuse   This is the causative factor for development of bacteremia and infective endocarditis   Patient has left AMA on prior occasions   HIV screen negative  -patient is not a candidate for at home PICC line/IV antibiotics  -acute pain service follow-up     7  Chronic HCV infection, transaminitis   Recent HIV was negative   The LFTs are waxing waning  -monitor LFTs      8  Left upper extremity DVT-in the setting of PICC line use   Patient now on anticoagulation  -vascular follow-up  -serial exam  -anticoagulation    Discussed the above management plan with the primary service    Antibiotics:  Cefazolin restart 25  Negative blood cultures 19    Subjective:  Patient has no fever, chills, sweats; no nausea, vomiting, diarrhea; no cough, shortness of breath; no increased pain  No new symptoms  She seems in better spirits  Objective:  Vitals:  Temp:  [97 1 °F (36 2 °C)-98 2 °F (36 8 °C)] 98 2 °F (36 8 °C)  HR:  [] 83  Resp:  [18] 18  BP: (105-122)/(62-69) 105/62  SpO2:  [98 %] 98 %  Temp (24hrs), Av 7 °F (36 5 °C), Min:97 1 °F (36 2 °C), Max:98 2 °F (36 8 °C)  Current: Temperature: 98 2 °F (36 8 °C)    Physical Exam:   General Appearance:  Alert, interactive, nontoxic, no acute distress  Throat: Oropharynx moist without lesions  Lungs:   Clear to auscultation bilaterally; no wheezes, rhonchi or rales; respirations unlabored   Heart:  RRR; no murmur, rub or gallop   Abdomen:   Soft, non-tender, non-distended, positive bowel sounds  Extremities: No clubbing, cyanosis or edema   Skin: No new rashes or lesions  No draining wounds noted         Labs, Imaging, & Other studies:   All pertinent labs and imaging studies were personally reviewed  Results from last 7 days   Lab Units 08/31/20  0541 08/28/20  0618   WBC Thousand/uL 5 06 5 10   HEMOGLOBIN g/dL 9 1* 8 4*   PLATELETS Thousands/uL 291 355     Results from last 7 days   Lab Units 09/01/20  2142 08/31/20  0541 08/28/20  0618   SODIUM mmol/L 139 138 138   POTASSIUM mmol/L 4 0 3 9 3 8   CHLORIDE mmol/L 104 103 105   CO2 mmol/L 29 30 29   BUN mg/dL 14 11 8   CREATININE mg/dL 0 54* 0 44* 0 57*   EGFR ml/min/1 73sq m 130 139 127   CALCIUM mg/dL 8 7 8 7 8 4   AST U/L 147* 147* 40   ALT U/L 72 56 19   ALK PHOS U/L 284* 229* 161*             Results from last 7 days   Lab Units 08/31/20  0540   CRP mg/L 14 3*        echocardiogram-severe tricuspid regurgitation, pedunculated mass consistent with vegetation, mildly dilated right ventricle

## 2020-09-03 NOTE — CONSULTS
Consultation - Cardiothoracic [place] [address] 34 y o  female MRN: [id number]  Unit/Bed#: [facility] 526-01 Encounter: [id number]    Physician Requesting Consult: Jhoana Shay MD    Reason for Consult / Principal Problem: Tricuspid valve endocarditis, severe tricuspid regurgitation    Inpatient consult to Cardiothoracic Surgery  Consult performed by: Marylin Villarreal PA-C  Consult ordered by: Jhoana Shay MD           History of Present Illness: Lj Mendoza is a 34y o  year old female with a past medical history notable for non treated hepatitis C, long standing opiate abuse followed by IV drug abuse with heroin and poor medical compliance in the past, with signing out [place] on 2 separate occasions once at [facility] and once at [facility] for endocarditis  She is being reevaluated for her tricuspid endocarditis and severe tricuspid regurgitation  Blood culture from 8/26 had no growth  Last positive blood culture was 8/13, growing staphylococcus aureus  She is receiving cefazolin via PICC through 9/26/2020 to complete a 6 week course  MRI on 8/30 shows right sacroiliitis that may be septic  Most recent CT image, on 8/26, shows evidence of septic pulmonary emboli, and no evidence for discitis osteomyelitis in the spine  Repeat echo on 9/2 again shows severe TR with a medium-sized vegetation on the anterior leaflet  Talking with the patient today, she states she is interested in getting heart surgery  She states she is ready to abstain from drug use upon discharge  Her mother is willing to house her for her recovery  She was tearful explaining that her boyfriend passed away a few days ago from complications from IV drug use, and she feels that is a "wake up call" for her        Past Medical History:  Past Medical History:   Diagnosis Date    Abnormal Pap smear of cervix     Anxiety     Depression     Endocarditis     2018    Hepatitis C     HPV (human papilloma virus) anogenital infection          Past Surgical History:   Past Surgical History:   Procedure Laterality Date    IR PICC LINE PLACEMENT DOUBLE LUMEN  8/26/2020    KNEE SURGERY Left     MOUTH SURGERY           Family History:  History reviewed  No pertinent family history        Social History:  Social History     Substance and Sexual Activity   Alcohol Use Not Currently    Alcohol/week: 0 0 standard drinks     Social History     Substance and Sexual Activity   Drug Use Yes    Types: Heroin    Comment: last use - one week ago     Social History     Tobacco Use   Smoking Status Former Smoker    Packs/day: 0 25    Types: Cigarettes    Last attempt to quit: 11/9/2016    Years since quitting: 3 8   Smokeless Tobacco Never Used   Tobacco Comment    current everyday smoker per [place]     Marital Status: Single      Home Medications:   Prior to Admission medications    Not on File       Inpatient Medications:  Scheduled Meds:   Current Facility-Administered Medications   Medication Dose Route Frequency Provider Last Rate    acetaminophen  650 mg Oral Q6H PRN Jazlyn Morton DO      ALPRAZolam  1 mg Oral Q4H PRN Alba Hughes MD      alteplase  2 mg Intracatheter Q12H PRN Jazlyn Morton DO      calcium acetate  667 mg Oral TID With Meals Alba Hughes MD      calcium carbonate  1,000 mg Oral Daily PRN Meagan Medel MD      cefazolin  2,000 mg Intravenous Q8H Asha Roca PA-C 2,000 mg (09/03/20 1404)    dicyclomine  10 mg Oral TID PRN Alba Hughes MD      DULoxetine  60 mg Oral Daily Jazlyn Morton DO      enoxaparin  1 mg/kg Subcutaneous Q12H Ramon Murray MD      gabapentin  100 mg Oral TID Jazlyn Morton DO      [facility] gabapentin  100 mg Oral BID Jazlyn Morton DO      [facility] gabapentin  100 mg Oral HS Jazlyn Morton DO      hydrocortisone   Topical 4x Daily PRN Jazlyn Morton DO      ibuprofen  600 mg Oral Q8H [facility] Buddy Dorian Roche, DO      iohexol  50 mL Oral 90 min pre-procedure Mychal Reyna,       lidocaine  2 patch Topical Daily Asha Roca PA-C      magnesium citrate  296 mL Oral Daily PRN John Michael MD      melatonin  6 mg Oral HS PRN Asha Roca PA-C      methocarbamol  750 mg Oral Q6H [facility] Malaika Seals MD      methylnaltrexone bromide  12 mg Subcutaneous Q48H PRN Malaika Seals MD      mirtazapine  30 mg Oral HS Dari Gonzales MD      morphine injection  2 mg Intravenous Q4H PRN Ashley Kaur MD      nystatin   Topical BID Emma Montenegro,       OLANZapine  5 mg Oral HS Roseanne Garsia MD      ondansetron  4 mg Intravenous Q6H PRN Dari Gonzales MD      oxyCODONE  10 mg Oral Q4H PRN LUIS ENRIQUE SharpeNP      oxyCODONE  15 mg Oral Q4H PRN Betty Carcamo, BOO      pantoprazole  20 mg Oral Early Morning Emma Montenegro DO      saccharomyces boulardii  250 mg Oral BID John Michael MD      senna  1 tablet Oral BID John Michael MD       Continuous Infusions:    PRN Meds:  acetaminophen, 650 mg, Q6H PRN  ALPRAZolam, 1 mg, Q4H PRN  alteplase, 2 mg, Q12H PRN  calcium carbonate, 1,000 mg, Daily PRN  dicyclomine, 10 mg, TID PRN  hydrocortisone, , 4x Daily PRN  magnesium citrate, 296 mL, Daily PRN  melatonin, 6 mg, HS PRN  methylnaltrexone bromide, 12 mg, Q48H PRN  morphine injection, 2 mg, Q4H PRN  ondansetron, 4 mg, Q6H PRN  oxyCODONE, 10 mg, Q4H PRN  oxyCODONE, 15 mg, Q4H PRN        Allergies: Allergies   Allergen Reactions    Cat Hair Extract     Dog Epithelium     Latex     Pollen Extract        Review of Systems:  Review of Systems   Constitutional: Positive for activity change and fatigue  Negative for appetite change, chills and fever  HENT: Positive for dental problem  Negative for nosebleeds and trouble swallowing  Eyes: Negative for pain and visual disturbance  Respiratory: Negative for cough, chest tightness, shortness of breath and wheezing      Cardiovascular: Negative for chest pain, palpitations and leg swelling  Gastrointestinal: Negative for abdominal pain, nausea and vomiting  Genitourinary: Negative for dysuria, flank pain and hematuria  Musculoskeletal: Positive for arthralgias, back pain and myalgias  Skin: Negative for color change and pallor  Neurological: Negative for seizures, syncope and numbness  Hematological: Negative for adenopathy  Does not bruise/bleed easily  Psychiatric/Behavioral: Negative for agitation, behavioral problems and confusion  Vital Signs:     Vitals:    09/01/20 2316 09/02/20 0700 09/02/20 1506 09/03/20 0900   BP: 120/64 115/56 122/69 105/62   BP Location:  Right arm Right arm Right arm   Pulse: 100 96 101 83   Resp: 18 18 18 18   Temp:  97 8 °F (36 6 °C) (!) 97 1 °F (36 2 °C) 98 2 °F (36 8 °C)   TempSrc: Oral Oral Oral Oral   SpO2: 98% 98% 98%    Weight:       Height:         Invasive Devices     Peripherally Inserted Central Catheter Line            PICC Line 08/26/20 8 days                Physical Exam:  Physical Exam  Constitutional:       General: She is not in acute distress  Appearance: She is well-developed  She is not diaphoretic  HENT:      Head: Normocephalic and atraumatic  Right Ear: External ear normal       Left Ear: External ear normal       Nose: Nose normal    Eyes:      General:         Right eye: No discharge  Left eye: No discharge  Neck:      Musculoskeletal: Neck supple  No muscular tenderness  Vascular: No JVD  Cardiovascular:      Rate and Rhythm: Normal rate and regular rhythm  Heart sounds: Murmur present  No friction rub  Pulmonary:      Effort: Pulmonary effort is normal       Breath sounds: Normal breath sounds  No wheezing or rales  Abdominal:      General: Bowel sounds are normal  There is no distension  Palpations: Abdomen is soft  Tenderness: There is no abdominal tenderness  Musculoskeletal:         General: No deformity        Right lower leg: No edema  Left lower leg: No edema  Skin:     General: Skin is warm and dry  Capillary Refill: Capillary refill takes less than 2 seconds  Findings: No erythema or rash  Neurological:      General: No focal deficit present  Mental Status: She is alert and oriented to person, place, and time  Psychiatric:         Mood and Affect: Mood normal          Behavior: Behavior normal          Lab Results:     Results from last 7 days   Lab Units 08/31/20  0541 08/28/20  0618   WBC Thousand/uL 5 06 5 10   HEMOGLOBIN g/dL 9 1* 8 4*   HEMATOCRIT % 28 6* 27 5*   PLATELETS Thousands/uL 291 355     Results from last 7 days   Lab Units 09/01/20  2142 08/31/20  0541 08/28/20  0618   POTASSIUM mmol/L 4 0 3 9 3 8   CHLORIDE mmol/L 104 103 105   CO2 mmol/L 29 30 29   BUN mg/dL 14 11 8   CREATININE mg/dL 0 54* 0 44* 0 57*   CALCIUM mg/dL 8 7 8 7 8 4         No results found for: HGBA1C  Lab Results   Component Value Date    TROPONINI <0 02 06/10/2019       Imaging Studies:     Echocardiogram: 9/2/2020  SUMMARY     LEFT VENTRICLE:  Systolic function was normal  Ejection fraction was estimated to be 55 %  There were no regional wall motion abnormalities      RIGHT VENTRICLE:  The ventricle was mildly dilated  Systolic function was normal   Wall thickness was increased      RIGHT ATRIUM:  The atrium was mildly dilated      TRICUSPID VALVE:  There was severe regurgitation  There was a medium-sized, pedunculated, echogenic, fixed vegetation, measuring 14 mm x 8 mm on the anterior leaflet on the right atrial aspect  MELISSA: 7/21/2020   SUMMARY     LEFT VENTRICLE:  Size was normal   Systolic function was normal  Ejection fraction was estimated to be 65 %  There were no regional wall motion abnormalities  Wall thickness was normal      RIGHT VENTRICLE:  The size was normal   Systolic function was normal      ATRIAL SEPTUM:  There was a small patent foramen ovale  Doppler evaluation was performed         MITRAL VALVE:  There was trace regurgitation      TRICUSPID VALVE:  There was moderate to severe regurgitation  There was a medium-sized, papillary, mobile vegetation, measuring 11 mm x 10 mm on the tip of the anterior leaflet, on the right atrial aspect  CXR: 8/31  There is partially improved aeration at the right lung base, with diminished right pleural disease and diminished right basilar atelectasis or infiltrate  Nodular opacities in the right upper lung zone persist, but have partially diminished  There is mild atelectasis at the left lower lung field  No pneumothorax  MRI right hip:  IMPRESSION:     Right sacroiliitis  Given recent history of right iliacus intramuscular fluid collections presumably abscesses, findings are suspicious for sacroiliac septic arthropathy      Right sacral and iliac bone edema and hyperemia abutting the sacroiliac joint without osseous destruction  While this may be reactive changes, early osteomyelitis may also have this appearance      Right iliacus muscle edema and hyperemia seen on coronal large field-of-view imaging attributed to nonspecific myositis  As this is outside the area of interest, it is only partially included on the axial images  No definitive rim-enhancing fluid   collection  Consider repeat CT imaging with contrast compared to the most recent CT examination if clinically warranted  CT abd/pelvis: 8/26  IMPRESSION:     No intra-abdominal or pelvic source of infection  Hepatosplenomegaly  Large volume of stool in the colon consistent with some element of constipation  Patchy pulmonary densities at the lung bases specialists for septic emboli  CT lumbar spine: 8/26  IMPRESSION:     Normal computed tomography of the lumbar spine  No paraspinal pathology  No evidence for discitis osteomyelitis  Chronic Left L5 pars defect without anterolisthesis        I have personally reviewed pertinent films in PACS    Assessment:  Principal Problem: Bacteremia due to Staphylococcus aureus  Active Problems:    Acute bacterial endocarditis    Septic embolism (HCC)    Bipolar 1 disorder (HCC)    Right hip pain    Intravenous drug abuse ([facility] [address] )    DVT of axillary vein, acute left (HCC)    Transaminitis    Tricuspid Valve endocarditis; Ongoing TVR workup    Plan:  Risks and benefits of tricuspid valve replacement were discussed today with the patient  They understand and wish to proceed with further workup and ultimately surgical intervention  Case discussed with SARAH Hurley was comfortable with our recommendations, and their questions were answered to their satisfaction  We will continue to evaluate the patient daily with further recommendations as work up is completed  Thank you for allowing us to participate in the care of this patient       SIGNATURE: Eri Lewis PA-C  DATE: September 3, 2020  TIME: 4:46 PM

## 2020-09-03 NOTE — PROGRESS NOTES
Progress Note - Lyudmila Taylor 1990, 34 y o  female MRN: [id number]    Unit/Bed#: [facility] 526-01 Encounter: [id number]    Primary Care Provider: Araceli Noe PA-C   Date and time admitted to hospital: 2020  7:39 PM        Transaminitis  Assessment & Plan  Check acute hep panel - pt reports hx of Hep C  Positive for hep C antibodies  AST and ALP continue to be up and down    DVT of axillary vein, acute left [facility])  Assessment & Plan  Increase lovenox to 60mg Q12h  Due to severe pain consulted with vascular surgery for treatment options - appreciated  Picc continues to function well and post recent blood culture is negative  Elevation of LUE  PICC team attempted to move picc line to right arm given left arm DVT pain  Unsuccessful  IR consulted   IR exchanged PICC   Needs 3 months of AC - can d/c on NOAC if affordable    Intravenous drug abuse [facility])  Assessment & Plan  · Patient with history of drug abuse and likely the source of bacteremia  · Patient has a history of signing against medical advise  · May benefit from drug rehab eventually if she becomes agreeable to host services  She was not interested during my conversation  · During the hospital stay in July of this year patient was positive for [facility] ,cocaine and opiates  Although pt previosuly reported to Dr Que Patino that she had not used any drugs for several years now, but admitted to Dr Floirna Dai that she was actively abusing cocaine and heroin  · She swears to me after her boyfriend  that she doesn't want to use drugs & get high again    Right hip pain  Assessment & Plan  · Patient was complaining of severe back pain mainly in the lower part of the back and also in the sacral region  · Patient had MRI of the lumbar and thoracic spine during the recent hospital stay in [facility] which was unremarkable      · - continue Cymbalta 30 mg daily - increased to 60 mg daily on   · - continue robaxin to 750 mg q 6  · - continue oxy 10 mg and 15 mg  prn  · - continue iv morphine - decrease to 2q4 prn today  · -continue tylenol prn - refused ATC  · - continue lidocaine patch if pt does not refuse  · -continue gabapentin to 100 TID - decrease to BID tomorrow  · add ibuprofen RTC and ppi for GI ppx  · Iliacus muscle abscess is noted on CT scan, continue antibiotic treatment as above, no indication for drainage at this time  · Repeat CT scan shows improvement in septic emboli to iliacus muscle  · ketamine infusion discontinued  · Patient has been reluctant with interventions and procedures  She cites that she wants IV pain meds before intervention  Extensive discussion regarding acute pain service recommendations  Will proceed with following recommendations by acute pain service  · MRI completed 8/30 shows sacroiliitis which ID recs against surgical intervention  ID recs weekly CRP and ESR, which are trending down   · Hold off on thoracolumbar MRI as pain in right hip and prior MRI unremarkable  · Opioid, benzo, and Neurontin taper scanned into media  · Neurontin taper already ordered  · Will have to follow taper as pt cannot be d/c on controlled substances and pain will need to be controlled w/ Cymbalta, Robaxin, ibuprofen, and tylenol    Bipolar 1 disorder ([facility] )  Assessment & Plan  · Patient 8/27 agreed to psych eval which is apprecaited  · Psych added Zyprexa  · C/w Cymbalta and Remeron  · So far has been on p r n  IV Ativan  She states that it is not working for her    Getting panic attacks after passing away of her boyfriend about 2 days ago  · She discuss this was with Pain Service recommends changing her from IV Ativan to oral Xanax 1 mg q 4 hours p r n   Ordered that    Septic embolism ([facility] )  Assessment & Plan  · Patient noted to have abnormalities seen in the CT of the chest abdomen and pelvis concerning for septic emboli  · There is pleural effusion on the CT scan of the chest and also on repeat chest x-ray  · Patient is rather asymptomatic from pulmonary standpoint  · Continue with the antibiotics  · Thoracic surgery have evaluated the pt and she is not short of breath   · 8/15-imaging right hip shows no osseous involvement; concern for proximity of abscess status SI joint, but no SI joint involvement at this time  · Management as above under hip pain  · c/o pleuritic CP  CXR shows no acute finding  Suspect pain could be related to septic emboli  No pain now      Acute bacterial endocarditis  Assessment & Plan  · Patient noted to have tricuspid while vegetation on echocardiogram done in July  Patient had a transthoracic and also transesophageal echocardiogram done during the last hospital stay  · With septic emboli to lungs and posterior iliacus muscle  · Patient is transferred over here to be evaluated by CT surgery  · She was noncommittal to abstain from illegal drugs or unintended use of medications  · When she continues to abstain and completes iv abx treatment they would consider surgical correction - this will likely be as OP   · Continue IV cefazolin as above  · repeat echo today per ID - shows to severe TR, 14 x 8 mm mobile vegetation on tricuspid valve  No pericardial effusion, midly dilated RV (not mentioned before)  Per d/w ID will reconsult CT surgery with these findings    * Bacteremia due to Staphylococcus aureus  Assessment & Plan  · Recurrent MSSA bacteremia  · Initially admitted to [facility] from 07/15-blood culture were positive for MSSA bacteremia, but patient signed AMA on 07/22  Her repeat blood culture done on 7/21 was negative after 5 days  · [facility] on 07/30-blood culture came back positive for MSSA and she left again is medical advice on 08/04  Her blood cultures came back positive on 07/30, but the bacteremia cleared as of 8 /2  Patient left AMA on 08/04    · Readmitted to [address] [place] [address] on 08/10-blood culture came back positive for Staph aureus  · 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 during the last hospital stay from 7/15-  · Plan is to continue IV cefazolin for a 6 week course per ID recommendations from 8/15 when B Cx became neg till   ·  removed PICC and PICC cx neg  · B Cx from  neg        [facility] Internal Medicine Progress Note  Patient: Sony Mccann 34 y o  female   MRN: [id number]  PCP: Virginia Mayer PA-C  Unit/Bed#: [facility] 526-01 Encounter: [id number]  Date Of Visit: 20    Assessment:    Principal Problem:    Bacteremia due to Staphylococcus aureus  Active Problems:    Acute bacterial endocarditis    Septic embolism (HCC)    Bipolar 1 disorder ([facility] )    Right hip pain    Intravenous drug abuse ([facility] )    DVT of axillary vein, acute left ([facility] )    Transaminitis      Plan:         VTE Pharmacologic Prophylaxis:   Pharmacologic: Enoxaparin (Lovenox)  Mechanical VTE Prophylaxis in Place: Yes    Patient Centered Rounds: I have performed bedside rounds with nursing staff today  Discussions with Specialists or Other Care Team Provider: APS, ID    Education and Discussions with Family / Patient: pateint    Time Spent for Care: 30 minutes  More than 50% of total time spent on counseling and coordination of care as described above  Current Length of Stay: 22 day(s)    Current Patient Status: Inpatient   Certification Statement: The patient will continue to require additional inpatient hospital stay due to IV Abx    Discharge Plan / Estimated Discharge Date:     Code Status: Level 1 - Full Code      Subjective:   C/o right hip pain    Objective:     Vitals:   Temp (24hrs), Av 2 °F (36 8 °C), Min:98 2 °F (36 8 °C), Max:98 2 °F (36 8 °C)    Temp:  [98 2 °F (36 8 °C)] 98 2 °F (36 8 °C)  HR:  [81-83] 81  Resp:  [18] 18  BP: (105-108)/(57-62) 108/57  SpO2:  [98 %] 98 %  Body mass index is 23 3 kg/m²  Input and Output Summary (last 24 hours):        Intake/Output Summary (Last 24 hours) at 9/3/2020 1907  Last data filed at 9/3/2020 1305  Gross per 24 hour   Intake 380 ml   Output 800 ml   Net -420 ml       Physical Exam:     Physical Exam  Vitals signs reviewed  HENT:      Head: Normocephalic and atraumatic  Eyes:      Conjunctiva/sclera: Conjunctivae normal    Cardiovascular:      Rate and Rhythm: Normal rate and regular rhythm  Heart sounds: Normal heart sounds  No murmur  Pulmonary:      Effort: Pulmonary effort is normal  No respiratory distress  Breath sounds: Normal breath sounds  No wheezing  Abdominal:      General: There is no distension  Tenderness: There is no abdominal tenderness  Neurological:      Mental Status: She is alert and oriented to person, place, and time  Additional Data:     Labs:    Results from last 7 days   Lab Units 08/31/20  0541   WBC Thousand/uL 5 06   HEMOGLOBIN g/dL 9 1*   HEMATOCRIT % 28 6*   PLATELETS Thousands/uL 291   NEUTROS PCT % 39*   LYMPHS PCT % 41   MONOS PCT % 10   EOS PCT % 8*     Results from last 7 days   Lab Units 09/01/20  2142   POTASSIUM mmol/L 4 0   CHLORIDE mmol/L 104   CO2 mmol/L 29   BUN mg/dL 14   CREATININE mg/dL 0 54*   CALCIUM mg/dL 8 7   ALK PHOS U/L 284*   ALT U/L 72   AST U/L 147*           * I Have Reviewed All Lab Data Listed Above    * Additional Pertinent Lab Tests Reviewed: No New Labs Available For Today    Imaging:    Imaging Reports Reviewed Today Include:   Imaging Personally Reviewed by Myself Includes:      Recent Cultures (last 7 days):           Last 24 Hours Medication List:   Current Facility-Administered Medications   Medication Dose Route Frequency Provider Last Rate    acetaminophen  650 mg Oral Q6H PRN Yovanny Christian DO      ALPRAZolam  1 mg Oral Q4H PRN Reyna Clarke MD      alteplase  2 mg Intracatheter Q12H PRN Yovanny Christian DO      calcium acetate  667 mg Oral TID With Meals Reyna Clarke MD      calcium carbonate  1,000 mg Oral Daily PRN MD Joseph Romo cefazolin  2,000 mg Intravenous Q8H Asha Roca PA-C 2,000 mg (09/03/20 1404)    dicyclomine  10 mg Oral TID PRN Queen Sánchez MD      DULoxetine  60 mg Oral Daily Reeder Mixer, DO      enoxaparin  1 mg/kg Subcutaneous Q12H [address] Raman Murrieta MD      gabapentin  100 mg Oral TID Reeder Mixer, DO      Followed by   Rajeev Vegas ON 9/4/2020] gabapentin  100 mg Oral BID Reeder Mixer, DO      Followed by   Rajeev Vegas ON 9/9/2020] gabapentin  100 mg Oral HS Reeder Mixer, DO      hydrocortisone   Topical 4x Daily PRN Reeder Mixer, DO      ibuprofen  600 mg Oral [facility] Reeder Mixer, DO      iohexol  50 mL Oral 90 min pre-procedure Hetul Reyna, DO      lidocaine  2 patch Topical Daily Asha Roca PA-C      magnesium citrate  296 mL Oral Daily PRN Wil Young MD      melatonin  6 mg Oral HS PRN Asha Roca PA-C      methocarbamol  750 mg Oral Q6H [address] Raman Murrieta MD      methylnaltrexone bromide  12 mg Subcutaneous Q48H PRN Raman Murrieta MD      mirtazapine  30 mg Oral HS Viet Patino MD      morphine injection  2 mg Intravenous Q4H PRN Queen Sánchez MD      nystatin   Topical BID Taco Mixer, DO      OLANZapine  5 mg Oral HS Adolfo Nath MD      ondansetron  4 mg Intravenous Q6H PRN Viet Patino MD      oxyCODONE  10 mg Oral Q4H PRN Estefania Mainland, CRNP      oxyCODONE  15 mg Oral Q4H PRN Estefania Mainland, CRNP      pantoprazole  20 mg Oral Early Morning Reeder Mixer, DO      saccharomyces boulardii  250 mg Oral BID Wil Young MD      senna  1 tablet Oral BID Wil Young MD          Today, Patient Was Seen By: Queen Sánchez MD    ** Please Note: This note has been constructed using a voice recognition system   **

## 2020-09-03 NOTE — ASSESSMENT & PLAN NOTE
· Patient with history of drug abuse and likely the source of bacteremia  · Patient has a history of signing against medical advise  · May benefit from drug rehab eventually if she becomes agreeable to host services  She was not interested during my conversation  · During the hospital stay in July of this year patient was [facility] ,[facility]aine and opiates    Although pt previosuly reported to Dr Sun Davies that she had not used any drugs for several years now, but admitted to Dr Wily Reeves that she was actively abusing cocaine and heroin  · She swears to me after her boyfriend  that she doesn't want to use drugs & get high again

## 2020-09-03 NOTE — PLAN OF CARE
Problem: Prexisting or High Potential for Compromised Skin Integrity  Goal: Skin integrity is maintained or improved  Description: INTERVENTIONS:  - Identify patients at risk for skin breakdown  - Assess and monitor skin integrity  - Assess and monitor nutrition and hydration status  - Monitor labs   - Assess for incontinence   - Turn and reposition patient  - Assist with mobility/ambulation  - Relieve pressure over bony prominences  - Avoid friction and shearing  - Provide appropriate hygiene as needed including keeping skin clean and dry  - Evaluate need for skin moisturizer/barrier cream  - Collaborate with interdisciplinary team   - Patient/family teaching  - Consider wound care consult   Outcome: Progressing     Problem: PAIN - ADULT  Goal: Verbalizes/displays adequate comfort level or baseline comfort level  Description: Interventions:  - Encourage patient to monitor pain and request assistance  - Assess pain using appropriate pain scale  - Administer analgesics based on type and severity of pain and evaluate response  - Implement non-pharmacological measures as appropriate and evaluate response  - Consider cultural and social influences on pain and pain management  - Notify physician/advanced practitioner if interventions unsuccessful or patient reports new pain  Outcome: Progressing     Problem: INFECTION - ADULT  Goal: Absence or prevention of progression during hospitalization  Description: INTERVENTIONS:  - Assess and monitor for signs and symptoms of infection  - Monitor lab/diagnostic results  - Monitor all insertion sites, i e  indwelling lines, tubes, and drains  - Monitor endotracheal if appropriate and nasal secretions for changes in amount and color  - [place] appropriate cooling/warming therapies per order  - Administer medications as ordered  - Instruct and encourage patient and family to use good hand hygiene technique  - Identify and instruct in appropriate isolation precautions for identified infection/condition  Outcome: Progressing  Goal: Absence of fever/infection during neutropenic period  Description: INTERVENTIONS:  - Monitor WBC    Outcome: Progressing     Problem: SAFETY ADULT  Goal: Patient will remain free of falls  Description: INTERVENTIONS:  - Assess patient frequently for physical needs  -  Identify cognitive and physical deficits and behaviors that affect risk of falls    -  [place] fall precautions as indicated by assessment   - Educate patient/family on patient safety including physical limitations  - Instruct patient to call for assistance with activity based on assessment  - Modify environment to reduce risk of injury  - Consider OT/PT consult to assist with strengthening/mobility  Outcome: Progressing  Goal: Maintain or return to baseline ADL function  Description: INTERVENTIONS:  -  Assess patient's ability to carry out ADLs; assess patient's baseline for ADL function and identify physical deficits which impact ability to perform ADLs (bathing, care of mouth/teeth, toileting, grooming, dressing, etc )  - Assess/evaluate cause of self-care deficits   - Assess range of motion  - Assess patient's mobility; develop plan if impaired  - Assess patient's need for assistive devices and provide as appropriate  - Encourage maximum independence but intervene and supervise when necessary  - Involve family in performance of ADLs  - Assess for home care needs following discharge   - Consider OT consult to assist with ADL evaluation and planning for discharge  - Provide patient education as appropriate  Outcome: Progressing  Goal: Maintain or return mobility status to optimal level  Description: INTERVENTIONS:  - Assess patient's baseline mobility status (ambulation, transfers, stairs, etc )    - Identify cognitive and physical deficits and behaviors that affect mobility  - Identify mobility aids required to assist with transfers and/or ambulation (gait belt, sit-to-stand, lift, walker, cane, etc )  - [place] fall precautions as indicated by assessment  - Record patient progress and toleration of activity level on Mobility SBAR; progress patient to next Phase/Stage  - Instruct patient to call for assistance with activity based on assessment  - Consider rehabilitation consult to assist with strengthening/weightbearing, etc   Outcome: Progressing     Problem: CARDIOVASCULAR - ADULT  Goal: Maintains optimal cardiac output and hemodynamic stability  Description: INTERVENTIONS:  - Monitor I/O, vital signs and rhythm  - Monitor for S/S and trends of decreased cardiac output  - Administer and titrate ordered vasoactive medications to optimize hemodynamic stability  - Assess quality of pulses, skin color and temperature  - Assess for signs of decreased coronary artery perfusion  - Instruct patient to report change in severity of symptoms  Outcome: Progressing  Goal: Absence of cardiac dysrhythmias or at baseline rhythm  Description: INTERVENTIONS:  - Continuous cardiac monitoring, vital signs, obtain 12 lead EKG if ordered  - Administer antiarrhythmic and heart rate control medications as ordered  - Monitor electrolytes and administer replacement therapy as ordered  Outcome: Progressing     Problem: METABOLIC, FLUID AND ELECTROLYTES - ADULT  Goal: Electrolytes maintained within normal limits  Description: INTERVENTIONS:  - Monitor labs and assess patient for signs and symptoms of electrolyte imbalances  - Administer electrolyte replacement as ordered  - Monitor response to electrolyte replacements, including repeat lab results as appropriate  - Instruct patient on fluid and nutrition as appropriate  Outcome: Progressing  Goal: Fluid balance maintained  Description: INTERVENTIONS:  - Monitor labs   - Monitor I/O and WT  - Instruct patient on fluid and nutrition as appropriate  - Assess for signs & symptoms of volume excess or deficit  Outcome: Progressing  Goal: Glucose maintained within target range  Description: INTERVENTIONS:  - Monitor Blood Glucose as ordered  - Assess for signs and symptoms of hyperglycemia and hypoglycemia  - Administer ordered medications to maintain glucose within target range  - Assess nutritional intake and initiate nutrition service referral as needed  Outcome: Progressing     Problem: HEMATOLOGIC - ADULT  Goal: Maintains hematologic stability  Description: INTERVENTIONS  - Assess for signs and symptoms of bleeding or hemorrhage  - Monitor labs  - Administer supportive blood products/factors as ordered and appropriate  Outcome: Progressing     Problem: Potential for Falls  Goal: Patient will remain free of falls  Description: INTERVENTIONS:  - Assess patient frequently for physical needs  -  Identify cognitive and physical deficits and behaviors that affect risk of falls  -  [place] fall precautions as indicated by assessment   - Educate patient/family on patient safety including physical limitations  - Instruct patient to call for assistance with activity based on assessment  - Modify environment to reduce risk of injury  - Consider OT/PT consult to assist with strengthening/mobility  Outcome: Progressing     Problem: Nutrition/Hydration-ADULT  Goal: Nutrient/Hydration intake appropriate for improving, restoring or maintaining nutritional needs  Description: Monitor and assess patient's nutrition/hydration status for malnutrition  Collaborate with interdisciplinary team and initiate plan and interventions as ordered  Monitor patient's weight and dietary intake as ordered or per policy  Utilize nutrition screening tool and intervene as necessary  Determine patient's food preferences and provide high-protein, high-caloric foods as appropriate       INTERVENTIONS:  - Monitor oral intake, urinary output, labs, and treatment plans  - Assess nutrition and hydration status and recommend course of action  - Evaluate amount of meals eaten  - Assist patient with eating if necessary - Allow adequate time for meals  - Recommend/ encourage appropriate diets, oral nutritional supplements, and vitamin/mineral supplements  - Order, calculate, and assess calorie counts as needed  - Recommend, monitor, and adjust tube feedings and TPN/PPN based on assessed needs  - Assess need for intravenous fluids  - Provide specific nutrition/hydration education as appropriate  - Include patient/family/caregiver in decisions related to nutrition  Outcome: Progressing     Problem: Knowledge Deficit  Goal: Patient/family/caregiver demonstrates understanding of disease process, treatment plan, medications, and discharge instructions  Description: Complete learning assessment and assess knowledge base    Interventions:  - Provide teaching at level of understanding  - Provide teaching via preferred learning methods  Outcome: Progressing

## 2020-09-03 NOTE — ASSESSMENT & PLAN NOTE
· Patient was complaining of severe back pain mainly in the lower part of the back and also in the sacral region  · Patient had MRI of the lumbar and thoracic spine during the recent hospital stay in [facility] which was unremarkable  · - continue Cymbalta 30 mg daily - increased to 60 mg daily on 9/1  · - continue robaxin to 750 mg q 6  · - continue oxy 10 mg and 15 mg  prn  · - continue iv morphine - decrease to 2q4 prn today  · -continue tylenol prn - refused ATC  · - continue lidocaine patch if pt does not refuse  · -continue gabapentin to 100 TID - decrease to BID tomorrow  · add ibuprofen RTC and ppi for GI ppx  · Iliacus muscle abscess is noted on CT scan, continue antibiotic treatment as above, no indication for drainage at this time  · Repeat CT scan shows improvement in septic emboli to iliacus muscle  · ketamine infusion discontinued  · Patient has been reluctant with interventions and procedures  She cites that she wants IV pain meds before intervention  Extensive discussion regarding acute pain service recommendations  Will proceed with following recommendations by acute pain service  · MRI completed 8/30 shows sacroiliitis which ID recs against surgical intervention    ID recs weekly CRP and ESR, which are trending down   · Hold off on thoracolumbar MRI as pain in right hip and prior MRI unremarkable  · Opioid, benzo, and Neurontin taper scanned into media  · Neurontin taper already ordered  · Will have to follow taper as pt cannot be d/c on controlled substances and pain will need to be controlled w/ Cymbalta, Robaxin, ibuprofen, and tylenol

## 2020-09-03 NOTE — H&P (VIEW-ONLY)
Consultation - Cardiothoracic [place] [address] 34 y o  female MRN: [id number]  Unit/Bed#: [facility] 526-01 Encounter: [id number]    Physician Requesting Consult: Elana Sandhu MD    Reason for Consult / Principal Problem: Tricuspid valve endocarditis, severe tricuspid regurgitation    Inpatient consult to Cardiothoracic Surgery  Consult performed by: Marilin Chapa PA-C  Consult ordered by: Elana Sandhu MD           History of Present Illness: Sony Mccann is a 34y o  year old female with a past medical history notable for non treated hepatitis C, long standing opiate abuse followed by IV drug abuse with heroin and poor medical compliance in the past, with signing out [place] on 2 separate occasions once at [facility] and once at [facility] for endocarditis  She is being reevaluated for her tricuspid endocarditis and severe tricuspid regurgitation  Blood culture from 8/26 had no growth  Last positive blood culture was 8/13, growing staphylococcus aureus  She is receiving cefazolin via PICC through 9/26/2020 to complete a 6 week course  MRI on 8/30 shows right sacroiliitis that may be septic  Most recent CT image, on 8/26, shows evidence of septic pulmonary emboli, and no evidence for discitis osteomyelitis in the spine  Repeat echo on 9/2 again shows severe TR with a medium-sized vegetation on the anterior leaflet  Talking with the patient today, she states she is interested in getting heart surgery  She states she is ready to abstain from drug use upon discharge  Her mother is willing to house her for her recovery  She was tearful explaining that her boyfriend passed away a few days ago from complications from IV drug use, and she feels that is a "wake up call" for her        Past Medical History:  Past Medical History:   Diagnosis Date    Abnormal Pap smear of cervix     Anxiety     Depression     Endocarditis     2018    Hepatitis C     HPV (human papilloma virus) anogenital infection          Past Surgical History:   Past Surgical History:   Procedure Laterality Date    IR PICC LINE PLACEMENT DOUBLE LUMEN  8/26/2020    KNEE SURGERY Left     MOUTH SURGERY           Family History:  History reviewed  No pertinent family history        Social History:  Social History     Substance and Sexual Activity   Alcohol Use Not Currently    Alcohol/week: 0 0 standard drinks     Social History     Substance and Sexual Activity   Drug Use Yes    Types: Heroin    Comment: last use - one week ago     Social History     Tobacco Use   Smoking Status Former Smoker    Packs/day: 0 25    Types: Cigarettes    Last attempt to quit: 11/9/2016    Years since quitting: 3 8   Smokeless Tobacco Never Used   Tobacco Comment    current everyday smoker per [place]     Marital Status: Single      Home Medications:   Prior to Admission medications    Not on File       Inpatient Medications:  Scheduled Meds:   Current Facility-Administered Medications   Medication Dose Route Frequency Provider Last Rate    acetaminophen  650 mg Oral Q6H PRN Bard Massed, DO      ALPRAZolam  1 mg Oral Q4H PRN Fran Kurtz MD      alteplase  2 mg Intracatheter Q12H PRN Bard Massed, DO      calcium acetate  667 mg Oral TID With Meals Fran Kurtz MD      calcium carbonate  1,000 mg Oral Daily PRN Diane Pereira MD      cefazolin  2,000 mg Intravenous Q8H Asha Roca PA-C 2,000 mg (09/03/20 1404)    dicyclomine  10 mg Oral TID PRN Fran Kurtz MD      DULoxetine  60 mg Oral Daily Bard Massed, DO      enoxaparin  1 mg/kg Subcutaneous Q12H Dee Dee Markham MD      gabapentin  100 mg Oral TID Bard Massed, DO      Followed by   Genevive Emms ON 9/4/2020] gabapentin  100 mg Oral BID Bard Massed, DO      Followed by   Genevive Emms ON 9/9/2020] gabapentin  100 mg Oral HS Bard Massed, DO      hydrocortisone   Topical 4x Daily PRN Bard Massed, DO      ibuprofen  600 mg Oral Q8H [facility] Buddy Wallace Gramajo,       iohexol  50 mL Oral 90 min pre-procedure Mychal Reyna,       lidocaine  2 patch Topical Daily Asha Roca PA-C      magnesium citrate  296 mL Oral Daily PRN Jovani Stephens MD      melatonin  6 mg Oral HS PRN Asha Roca PA-C      methocarbamol  750 mg Oral Q6H [address] Katelynn Church MD      methylnaltrexone bromide  12 mg Subcutaneous Q48H PRN Katelynn Church MD      mirtazapine  30 mg Oral HS Paige Brooks MD      morphine injection  2 mg Intravenous Q4H PRN Chava Brnach MD      nystatin   Topical BID Emilykristen Carpenter, DO      OLANZapine  5 mg Oral HS Philomena Tellez MD      ondansetron  4 mg Intravenous Q6H PRN Paige Brooks MD      oxyCODONE  10 mg Oral Q4H PRN BOO Hernandez      oxyCODONE  15 mg Oral Q4H PRN BOO Hernandez      pantoprazole  20 mg Oral Early Morning Emily Jayden, DO      saccharomyces boulardii  250 mg Oral BID Jovani Stephens MD      senna  1 tablet Oral BID Jovani Stephens MD       Continuous Infusions:    PRN Meds:  acetaminophen, 650 mg, Q6H PRN  ALPRAZolam, 1 mg, Q4H PRN  alteplase, 2 mg, Q12H PRN  calcium carbonate, 1,000 mg, Daily PRN  dicyclomine, 10 mg, TID PRN  hydrocortisone, , 4x Daily PRN  magnesium citrate, 296 mL, Daily PRN  melatonin, 6 mg, HS PRN  methylnaltrexone bromide, 12 mg, Q48H PRN  morphine injection, 2 mg, Q4H PRN  ondansetron, 4 mg, Q6H PRN  oxyCODONE, 10 mg, Q4H PRN  oxyCODONE, 15 mg, Q4H PRN        Allergies: Allergies   Allergen Reactions    Cat Hair Extract     Dog Epithelium     Latex     Pollen Extract        Review of Systems:  Review of Systems   Constitutional: Positive for activity change and fatigue  Negative for appetite change, chills and fever  HENT: Positive for dental problem  Negative for nosebleeds and trouble swallowing  Eyes: Negative for pain and visual disturbance  Respiratory: Negative for cough, chest tightness, shortness of breath and wheezing      Cardiovascular: Negative for chest pain, palpitations and leg swelling  Gastrointestinal: Negative for abdominal pain, nausea and vomiting  Genitourinary: Negative for dysuria, flank pain and hematuria  Musculoskeletal: Positive for arthralgias, back pain and myalgias  Skin: Negative for color change and pallor  Neurological: Negative for seizures, syncope and numbness  Hematological: Negative for adenopathy  Does not bruise/bleed easily  Psychiatric/Behavioral: Negative for agitation, behavioral problems and confusion  Vital Signs:     Vitals:    09/01/20 2316 09/02/20 0700 09/02/20 1506 09/03/20 0900   BP: 120/64 115/56 122/69 105/62   BP Location:  Right arm Right arm Right arm   Pulse: 100 96 101 83   Resp: 18 18 18 18   Temp:  97 8 °F (36 6 °C) (!) 97 1 °F (36 2 °C) 98 2 °F (36 8 °C)   TempSrc: Oral Oral Oral Oral   SpO2: 98% 98% 98%    Weight:       Height:         Invasive Devices     Peripherally Inserted Central Catheter Line            PICC Line 08/26/20 8 days                Physical Exam:  Physical Exam  Constitutional:       General: She is not in acute distress  Appearance: She is well-developed  She is not diaphoretic  HENT:      Head: Normocephalic and atraumatic  Right Ear: External ear normal       Left Ear: External ear normal       Nose: Nose normal    Eyes:      General:         Right eye: No discharge  Left eye: No discharge  Neck:      Musculoskeletal: Neck supple  No muscular tenderness  Vascular: No JVD  Cardiovascular:      Rate and Rhythm: Normal rate and regular rhythm  Heart sounds: Murmur present  No friction rub  Pulmonary:      Effort: Pulmonary effort is normal       Breath sounds: Normal breath sounds  No wheezing or rales  Abdominal:      General: Bowel sounds are normal  There is no distension  Palpations: Abdomen is soft  Tenderness: There is no abdominal tenderness  Musculoskeletal:         General: No deformity        Right lower leg: No edema  Left lower leg: No edema  Skin:     General: Skin is warm and dry  Capillary Refill: Capillary refill takes less than 2 seconds  Findings: No erythema or rash  Neurological:      General: No focal deficit present  Mental Status: She is alert and oriented to person, place, and time  Psychiatric:         Mood and Affect: Mood normal          Behavior: Behavior normal          Lab Results:     Results from last 7 days   Lab Units 08/31/20  0541 08/28/20  0618   WBC Thousand/uL 5 06 5 10   HEMOGLOBIN g/dL 9 1* 8 4*   HEMATOCRIT % 28 6* 27 5*   PLATELETS Thousands/uL 291 355     Results from last 7 days   Lab Units 09/01/20  2142 08/31/20  0541 08/28/20  0618   POTASSIUM mmol/L 4 0 3 9 3 8   CHLORIDE mmol/L 104 103 105   CO2 mmol/L 29 30 29   BUN mg/dL 14 11 8   CREATININE mg/dL 0 54* 0 44* 0 57*   CALCIUM mg/dL 8 7 8 7 8 4         No results found for: HGBA1C  Lab Results   Component Value Date    TROPONINI <0 02 06/10/2019       Imaging Studies:     Echocardiogram: 9/2/2020  SUMMARY     LEFT VENTRICLE:  Systolic function was normal  Ejection fraction was estimated to be 55 %  There were no regional wall motion abnormalities      RIGHT VENTRICLE:  The ventricle was mildly dilated  Systolic function was normal   Wall thickness was increased      RIGHT ATRIUM:  The atrium was mildly dilated      TRICUSPID VALVE:  There was severe regurgitation  There was a medium-sized, pedunculated, echogenic, fixed vegetation, measuring 14 mm x 8 mm on the anterior leaflet on the right atrial aspect  MELISSA: 7/21/2020   SUMMARY     LEFT VENTRICLE:  Size was normal   Systolic function was normal  Ejection fraction was estimated to be 65 %  There were no regional wall motion abnormalities  Wall thickness was normal      RIGHT VENTRICLE:  The size was normal   Systolic function was normal      ATRIAL SEPTUM:  There was a small patent foramen ovale  Doppler evaluation was performed         MITRAL VALVE:  There was trace regurgitation      TRICUSPID VALVE:  There was moderate to severe regurgitation  There was a medium-sized, papillary, mobile vegetation, measuring 11 mm x 10 mm on the tip of the anterior leaflet, on the right atrial aspect  CXR: 8/31  There is partially improved aeration at the right lung base, with diminished right pleural disease and diminished right basilar atelectasis or infiltrate  Nodular opacities in the right upper lung zone persist, but have partially diminished  There is mild atelectasis at the left lower lung field  No pneumothorax  MRI right hip:  IMPRESSION:     Right sacroiliitis  Given recent history of right iliacus intramuscular fluid collections presumably abscesses, findings are suspicious for sacroiliac septic arthropathy      Right sacral and iliac bone edema and hyperemia abutting the sacroiliac joint without osseous destruction  While this may be reactive changes, early osteomyelitis may also have this appearance      Right iliacus muscle edema and hyperemia seen on coronal large field-of-view imaging attributed to nonspecific myositis  As this is outside the area of interest, it is only partially included on the axial images  No definitive rim-enhancing fluid   collection  Consider repeat CT imaging with contrast compared to the most recent CT examination if clinically warranted  CT abd/pelvis: 8/26  IMPRESSION:     No intra-abdominal or pelvic source of infection  Hepatosplenomegaly  Large volume of stool in the colon consistent with some element of constipation  Patchy pulmonary densities at the lung bases specialists for septic emboli  CT lumbar spine: 8/26  IMPRESSION:     Normal computed tomography of the lumbar spine  No paraspinal pathology  No evidence for discitis osteomyelitis  Chronic Left L5 pars defect without anterolisthesis        I have personally reviewed pertinent films in PACS    Assessment:  Principal Problem: Bacteremia due to Staphylococcus aureus  Active Problems:    Acute bacterial endocarditis    Septic embolism (HCC)    Bipolar 1 disorder (HCC)    Right hip pain    Intravenous drug abuse ([facility] [address] )    DVT of axillary vein, acute left (HCC)    Transaminitis    Tricuspid Valve endocarditis; Ongoing TVR workup    Plan:  Risks and benefits of tricuspid valve replacement were discussed today with the patient  They understand and wish to proceed with further workup and ultimately surgical intervention  Case discussed with SARAH Saba was comfortable with our recommendations, and their questions were answered to their satisfaction  We will continue to evaluate the patient daily with further recommendations as work up is completed  Thank you for allowing us to participate in the care of this patient       SIGNATURE: Nara Christiansen PA-C  DATE: September 3, 2020  TIME: 4:46 PM

## 2020-09-03 NOTE — ASSESSMENT & PLAN NOTE
· Patient noted to have tricuspid while vegetation on echocardiogram done in July  Patient had a transthoracic and also transesophageal echocardiogram done during the last hospital stay  · With septic emboli to lungs and posterior iliacus muscle  · Patient is transferred over here to be evaluated by CT surgery  · She was noncommittal to abstain from illegal drugs or unintended use of medications  · When she continues to abstain and completes iv abx treatment they would consider surgical correction - this will likely be as OP   · Continue IV cefazolin as above  · repeat echo today per ID - shows to severe TR, 14 x 8 mm mobile vegetation on tricuspid valve  No pericardial effusion, midly dilated RV (not mentioned before)   Per d/w ID will reconsult CT surgery with these findings

## 2020-09-03 NOTE — PROGRESS NOTES
Progress Note - Acute Pain Service    Nikky Colindres 34 y o  female MRN: [id number]  Unit/Bed#: [facility] 526-01 Encounter: [id number]      Assessment:   Principal Problem:    Bacteremia due to Staphylococcus aureus  Active Problems:    Acute bacterial endocarditis    Septic embolism (HCC)    Bipolar 1 disorder (HCC)    Right hip pain    Intravenous drug abuse ([address] )    DVT of axillary vein, acute left (HCC)    Transaminitis      Plan:   · Continue acetaminophen 650 mg p o  Q 6 hours p r n  mild pain  · Continue ibuprofen 600 mg p o  q 8 hours scheduled  · Continue oxycodone 10 mg p o  q 4 hours p r n  moderate pain  · Continue oxycodone 15 mg p o  q 4 hours p r n  severe pain  · Adjust morphine to 2 mg IV q 4 hours p r n  breakthrough pain  · Continue alprazolam 1 mg p o  q 4 hours p r n  anxiety  · Continue duloxetine 60 mg p o  daily scheduled  · Continue gabapentin 100 mg p o  t i d  scheduled, decreases to 100 mg p o  b i d  tomorrow (already ordered)  · Continue methocarbamol 750 mg p o  q 6 hours scheduled  · Continue lidocaine 5% patch x2 for 12 hours daily  APS will continue to follow; please contact [facility] ( [phone]) with any further questions    Pain History  Current pain location(s):  Right hip  Pain Scale:   9/10  Quality:  Sharp  24 hour history:  Patient continues to complain of severe right hip pain, however appears to be moving much more easily, moving around in bed with full range of motion of the right hip  Walked down the [place] twice yesterday with rolling walker  Has been able to get out of bed to use the restroom  Overall appears to be improving  Again discussed with the patient her decreased pain tolerance secondary to opioid dependence and again reviewed the current plan for weaning opioids, benzodiazepines and gabapentin  Patient agrees with the plan  Opioid requirement previous 24 hours:  Oxycodone 45 mg p o , morphine 6 mg IV       Meds/Allergies   all current active meds have been reviewed, current meds:   Current Facility-Administered Medications   Medication Dose Route Frequency    acetaminophen (TYLENOL) tablet 650 mg  650 mg Oral Q6H PRN    ALPRAZolam (XANAX) tablet 1 mg  1 mg Oral Q4H PRN    alteplase (CATHFLO) injection 2 mg  2 mg Intracatheter Q12H PRN    calcium acetate (PHOSLO) capsule 667 mg  667 mg Oral TID With Meals    calcium carbonate (TUMS) chewable tablet 1,000 mg  1,000 mg Oral Daily PRN    ceFAZolin (ANCEF) IVPB (premix) 2,000 mg 50 mL  2,000 mg Intravenous Q8H    dicyclomine (BENTYL) capsule 10 mg  10 mg Oral TID PRN    DULoxetine (CYMBALTA) delayed release capsule 60 mg  60 mg Oral Daily    enoxaparin (LOVENOX) subcutaneous injection 60 mg  1 mg/kg Subcutaneous Q12H JEFF    gabapentin (NEURONTIN) capsule 100 mg  100 mg Oral TID    Followed by   Marla Ma ON 9/4/2020] gabapentin (NEURONTIN) capsule 100 mg  100 mg Oral BID    Followed by   Marla Ma ON 9/9/2020] gabapentin (NEURONTIN) capsule 100 mg  100 mg Oral HS    hydrocortisone 1 % cream   Topical 4x Daily PRN    ibuprofen (MOTRIN) tablet 600 mg  600 mg Oral Q8H [address]    iohexol (OMNIPAQUE) 240 MG/ML solution 50 mL  50 mL Oral 90 min pre-procedure    lidocaine (LIDODERM) 5 % patch 2 patch  2 patch Topical Daily    magnesium citrate (CITROMA) oral solution 296 mL  296 mL Oral Daily PRN    melatonin tablet 6 mg  6 mg Oral HS PRN    methocarbamol (ROBAXIN) tablet 750 mg  750 mg Oral Q6H [address]    methylnaltrexone (RELISTOR) subcutaneous injection 12 mg  12 mg Subcutaneous Q48H PRN    mirtazapine (REMERON) tablet 30 mg  30 mg Oral HS    morphine injection 2 mg  2 mg Intravenous Q4H PRN    nystatin (MYCOSTATIN) cream   Topical BID    OLANZapine (ZyPREXA) tablet 5 mg  5 mg Oral HS    ondansetron (ZOFRAN) injection 4 mg  4 mg Intravenous Q6H PRN    oxyCODONE (ROXICODONE) immediate release tablet 10 mg  10 mg Oral Q4H PRN    oxyCODONE (ROXICODONE) IR tablet 15 mg  15 mg Oral Q4H PRN    pantoprazole (PROTONIX) EC tablet 20 mg  20 mg Oral Early Morning    saccharomyces boulardii (FLORASTOR) capsule 250 mg  250 mg Oral BID    senna (SENOKOT) tablet 8 6 mg  1 tablet Oral BID    and PTA meds:   None       Allergies   Allergen Reactions    Cat Hair Extract     Dog Epithelium     Latex     Pollen Extract        Objective     Temp:  [97 1 °F (36 2 °C)-98 2 °F (36 8 °C)] 98 2 °F (36 8 °C)  HR:  [] 83  Resp:  [18] 18  BP: (105-122)/(62-69) 105/62    Physical Exam  Vitals signs and nursing note reviewed  Constitutional:       General: She is awake  She is not in acute distress  Eyes:      Pupils: Pupils are equal, round, and reactive to light  Cardiovascular:      Rate and Rhythm: Normal rate and regular rhythm  Skin:     General: Skin is warm and dry  Neurological:      General: No focal deficit present  Mental Status: She is alert and oriented to person, place, and time  GCS: GCS eye subscore is 4  GCS verbal subscore is 5  GCS motor subscore is 6  Psychiatric:         Behavior: Behavior is cooperative  Lab Results:   Results from last 7 days   Lab Units 08/31/20  0541   WBC Thousand/uL 5 06   HEMOGLOBIN g/dL 9 1*   HEMATOCRIT % 28 6*   PLATELETS Thousands/uL 291      Results from last 7 days   Lab Units 09/01/20  2142   POTASSIUM mmol/L 4 0   CHLORIDE mmol/L 104   CO2 mmol/L 29   BUN mg/dL 14   CREATININE mg/dL 0 54*   CALCIUM mg/dL 8 7   ALK PHOS U/L 284*   ALT U/L 72   AST U/L 147*       Imaging Studies: I have personally reviewed pertinent reports  EKG, Pathology, and Other Studies: I have personally reviewed pertinent reports  Counseling / Coordination of Care  Total floor / unit time spent today 20 minutes  Greater than 50% of total time was spent with the patient and / or family counseling and / or coordination of care   A description of the counseling / coordination of care:  Patient interview, physical examination, review of medical record, review of imaging and laboratory data, development of pain management plan, discussion of pain management plan with patient and primary service      Lupe Cervantes PA-C

## 2020-09-03 NOTE — ASSESSMENT & PLAN NOTE
· Patient noted to have abnormalities seen in the CT of the chest abdomen and pelvis concerning for septic emboli  · There is pleural effusion on the CT scan of the chest and also on repeat chest x-ray  · Patient is rather asymptomatic from pulmonary standpoint  · Continue with the antibiotics  · Thoracic surgery have evaluated the pt and she is not short of breath   · 8/15-imaging right hip shows no osseous involvement; concern for proximity of abscess status SI joint, but no SI joint involvement at this time  · Management as above under hip pain  · c/o pleuritic CP  CXR shows no acute finding  Suspect pain could be related to septic emboli   No pain now

## 2020-09-04 ENCOUNTER — APPOINTMENT (INPATIENT)
Dept: RADIOLOGY | Facility: HOSPITAL | Age: 30
DRG: 163 | End: 2020-09-04
Payer: COMMERCIAL

## 2020-09-04 ENCOUNTER — APPOINTMENT (INPATIENT)
Dept: NON INVASIVE DIAGNOSTICS | Facility: HOSPITAL | Age: 30
DRG: 163 | End: 2020-09-04
Payer: COMMERCIAL

## 2020-09-04 LAB
BACTERIA UR QL AUTO: ABNORMAL /HPF
BILIRUB UR QL STRIP: ABNORMAL
CLARITY UR: CLEAR
COLOR UR: ABNORMAL
GLUCOSE UR STRIP-MCNC: NEGATIVE MG/DL
HGB UR QL STRIP.AUTO: NEGATIVE
HYALINE CASTS #/AREA URNS LPF: ABNORMAL /LPF
KETONES UR STRIP-MCNC: ABNORMAL MG/DL
LEUKOCYTE ESTERASE UR QL STRIP: NEGATIVE
NITRITE UR QL STRIP: NEGATIVE
NON-SQ EPI CELLS URNS QL MICRO: ABNORMAL /HPF
PH UR STRIP.AUTO: 5.5 [PH]
PROT UR STRIP-MCNC: ABNORMAL MG/DL
RBC #/AREA URNS AUTO: ABNORMAL /HPF
SARS-COV-2 RNA RESP QL NAA+PROBE: NEGATIVE
SP GR UR STRIP.AUTO: 1.04 (ref 1–1.03)
UROBILINOGEN UR QL STRIP.AUTO: 0.2 E.U./DL
WBC #/AREA URNS AUTO: ABNORMAL /HPF

## 2020-09-04 PROCEDURE — 93880 EXTRACRANIAL BILAT STUDY: CPT

## 2020-09-04 PROCEDURE — 71250 CT THORAX DX C-: CPT

## 2020-09-04 PROCEDURE — U0003 INFECTIOUS AGENT DETECTION BY NUCLEIC ACID (DNA OR RNA); SEVERE ACUTE RESPIRATORY SYNDROME CORONAVIRUS 2 (SARS-COV-2) (CORONAVIRUS DISEASE [COVID-19]), AMPLIFIED PROBE TECHNIQUE, MAKING USE OF HIGH THROUGHPUT TECHNOLOGIES AS DESCRIBED BY CMS-2020-01-R: HCPCS | Performed by: PHYSICIAN ASSISTANT

## 2020-09-04 PROCEDURE — G1004 CDSM NDSC: HCPCS

## 2020-09-04 PROCEDURE — 99232 SBSQ HOSP IP/OBS MODERATE 35: CPT | Performed by: PHYSICIAN ASSISTANT

## 2020-09-04 PROCEDURE — 70355 PANORAMIC X-RAY OF JAWS: CPT

## 2020-09-04 PROCEDURE — 99232 SBSQ HOSP IP/OBS MODERATE 35: CPT | Performed by: HOSPITALIST

## 2020-09-04 PROCEDURE — 87081 CULTURE SCREEN ONLY: CPT | Performed by: PHYSICIAN ASSISTANT

## 2020-09-04 PROCEDURE — 97116 GAIT TRAINING THERAPY: CPT

## 2020-09-04 PROCEDURE — 99233 SBSQ HOSP IP/OBS HIGH 50: CPT | Performed by: INTERNAL MEDICINE

## 2020-09-04 PROCEDURE — 81001 URINALYSIS AUTO W/SCOPE: CPT | Performed by: PHYSICIAN ASSISTANT

## 2020-09-04 RX ORDER — CHLORHEXIDINE GLUCONATE 0.12 MG/ML
15 RINSE ORAL EVERY 12 HOURS SCHEDULED
Status: DISCONTINUED | OUTPATIENT
Start: 2020-09-04 | End: 2020-09-10 | Stop reason: SDUPTHER

## 2020-09-04 RX ADMIN — Medication 250 MG: at 09:29

## 2020-09-04 RX ADMIN — ENOXAPARIN SODIUM 60 MG: 60 INJECTION SUBCUTANEOUS at 23:21

## 2020-09-04 RX ADMIN — METHOCARBAMOL TABLETS 750 MG: 750 TABLET, COATED ORAL at 12:05

## 2020-09-04 RX ADMIN — ALPRAZOLAM 1 MG: 0.5 TABLET ORAL at 14:14

## 2020-09-04 RX ADMIN — Medication 250 MG: at 18:28

## 2020-09-04 RX ADMIN — OLANZAPINE 5 MG: 5 TABLET, FILM COATED ORAL at 23:22

## 2020-09-04 RX ADMIN — METHOCARBAMOL TABLETS 750 MG: 750 TABLET, COATED ORAL at 05:27

## 2020-09-04 RX ADMIN — OXYCODONE HYDROCHLORIDE 15 MG: 10 TABLET ORAL at 05:29

## 2020-09-04 RX ADMIN — MORPHINE SULFATE 2 MG: 2 INJECTION, SOLUTION INTRAMUSCULAR; INTRAVENOUS at 12:07

## 2020-09-04 RX ADMIN — CHLORHEXIDINE GLUCONATE 0.12% ORAL RINSE 15 ML: 1.2 LIQUID ORAL at 12:06

## 2020-09-04 RX ADMIN — PANTOPRAZOLE SODIUM 20 MG: 20 TABLET, DELAYED RELEASE ORAL at 05:27

## 2020-09-04 RX ADMIN — ENOXAPARIN SODIUM 60 MG: 60 INJECTION SUBCUTANEOUS at 09:31

## 2020-09-04 RX ADMIN — CHLORHEXIDINE GLUCONATE 0.12% ORAL RINSE 15 ML: 1.2 LIQUID ORAL at 23:21

## 2020-09-04 RX ADMIN — OXYCODONE HYDROCHLORIDE 15 MG: 10 TABLET ORAL at 21:32

## 2020-09-04 RX ADMIN — METHOCARBAMOL TABLETS 750 MG: 750 TABLET, COATED ORAL at 23:20

## 2020-09-04 RX ADMIN — IBUPROFEN 600 MG: 600 TABLET ORAL at 05:27

## 2020-09-04 RX ADMIN — NYSTATIN: 100000 CREAM TOPICAL at 18:30

## 2020-09-04 RX ADMIN — CALCIUM ACETATE 667 MG: 667 CAPSULE ORAL at 07:40

## 2020-09-04 RX ADMIN — CALCIUM ACETATE 667 MG: 667 CAPSULE ORAL at 12:06

## 2020-09-04 RX ADMIN — MIRTAZAPINE 30 MG: 30 TABLET, FILM COATED ORAL at 23:21

## 2020-09-04 RX ADMIN — GABAPENTIN 100 MG: 100 CAPSULE ORAL at 18:29

## 2020-09-04 RX ADMIN — MUPIROCIN 1 APPLICATION: 20 OINTMENT TOPICAL at 23:21

## 2020-09-04 RX ADMIN — MUPIROCIN 1 APPLICATION: 20 OINTMENT TOPICAL at 12:06

## 2020-09-04 RX ADMIN — CEFAZOLIN SODIUM 2000 MG: 2 SOLUTION INTRAVENOUS at 23:21

## 2020-09-04 RX ADMIN — OXYCODONE HYDROCHLORIDE 15 MG: 10 TABLET ORAL at 18:29

## 2020-09-04 RX ADMIN — ALPRAZOLAM 1 MG: 0.5 TABLET ORAL at 23:20

## 2020-09-04 RX ADMIN — IBUPROFEN 600 MG: 600 TABLET ORAL at 23:21

## 2020-09-04 RX ADMIN — DULOXETINE HYDROCHLORIDE 60 MG: 60 CAPSULE, DELAYED RELEASE ORAL at 09:28

## 2020-09-04 RX ADMIN — GABAPENTIN 100 MG: 100 CAPSULE ORAL at 09:33

## 2020-09-04 RX ADMIN — CEFAZOLIN SODIUM 2000 MG: 2 SOLUTION INTRAVENOUS at 14:07

## 2020-09-04 RX ADMIN — CEFAZOLIN SODIUM 2000 MG: 2 SOLUTION INTRAVENOUS at 05:27

## 2020-09-04 RX ADMIN — OXYCODONE HYDROCHLORIDE 15 MG: 10 TABLET ORAL at 09:32

## 2020-09-04 RX ADMIN — METHOCARBAMOL TABLETS 750 MG: 750 TABLET, COATED ORAL at 18:28

## 2020-09-04 RX ADMIN — MORPHINE SULFATE 2 MG: 2 INJECTION, SOLUTION INTRAMUSCULAR; INTRAVENOUS at 07:44

## 2020-09-04 RX ADMIN — SENNOSIDES 8.6 MG: 8.6 TABLET ORAL at 09:28

## 2020-09-04 RX ADMIN — ALPRAZOLAM 1 MG: 0.5 TABLET ORAL at 00:09

## 2020-09-04 RX ADMIN — CALCIUM ACETATE 667 MG: 667 CAPSULE ORAL at 18:28

## 2020-09-04 RX ADMIN — SENNOSIDES 8.6 MG: 8.6 TABLET ORAL at 18:28

## 2020-09-04 RX ADMIN — IBUPROFEN 600 MG: 600 TABLET ORAL at 14:11

## 2020-09-04 NOTE — CONSULTS
Oral and Maxillofacial Surgery Consult    Pt seen 09/04/20 1:48 PM     Assessment  34 y o  female evaluated for dental clearance prior to valve srugery  On bedside dental exam, patient presents with carious tooth #9 and impacted root tips #32  Mandible panorex reveals root tips #32 and carious #9  No signs of acute dental infection noted on exam or on panorex  Pt would benefit from extraction of root tips #32 before cardiac surgery  Plan for OR on Sunday 9/6 AM for extraction under MAC sedation  Plan:  - NPO/IVF Saturday at midnight for OR Sunday AM  - Encourage good oral hygiene  - DVT ppx: SCD, OOB; please hold pharmacologic AC for the OR if possible, can start post-op  - Please medically optimize pt for treatment, and give any recommendations/modifications for extractions under general anesthesia    D/w OMFS attg on call Dr Harshal Rebollar          Inpatient consult to Oral and Maxillofacial Surgery     Performed by  Shikha Byrd     Authorized by Sakshi Shah PA-C               HPI: 34 y o  female with PMH of anxiety, depression, hep C, endocarditis and drug use currently admitted for acute endocarditis  Consult requested by cardiac surgery for dental clearance prior to valve surgery  Pt denies any dental pain or complains  States she receives regular dental care  Reports one wisdom tooth on the lower right that broke months ago but asymptomatic  PMH:   Past Medical History:   Diagnosis Date    Abnormal Pap smear of cervix     Anxiety     Depression     Endocarditis     2018    Hepatitis C     HPV (human papilloma virus) anogenital infection         Allergies:    Allergies   Allergen Reactions    Cat Hair Extract     Dog Epithelium     Latex     Pollen Extract        Meds:     Current Facility-Administered Medications:     acetaminophen (TYLENOL) tablet 650 mg, 650 mg, Oral, Q6H PRN, Shu De La Torre DO    ALPRAZolam (XANAX) tablet 1 mg, 1 mg, Oral, Q4H PRN, Gin Ledesma MD, 1 mg at 20 0009    alteplase (CATHFLO) injection 2 mg, 2 mg, Intracatheter, Q12H PRN, Cande Brando, DO, 2 mg at 20 1522    calcium acetate (PHOSLO) capsule 667 mg, 667 mg, Oral, TID With Meals, Siria Lopez MD, 667 mg at 20 1206    calcium carbonate (TUMS) chewable tablet 1,000 mg, 1,000 mg, Oral, Daily PRN, Diamond Edwards MD, 1,000 mg at 20 2149    ceFAZolin (ANCEF) IVPB (premix) 2,000 mg 50 mL, 2,000 mg, Intravenous, Q8H, Asha Roca PA-C, Last Rate: 100 mL/hr at 20 0527, 2,000 mg at 20 0527    chlorhexidine (PERIDEX) 0 12 % oral rinse 15 mL, 15 mL, [place], Q12H [address], Richmond Harris PA-C, 15 mL at 20 1206    dicyclomine (BENTYL) capsule 10 mg, 10 mg, Oral, TID PRN, Siria Lopez MD, 10 mg at 20 1151    DULoxetine (CYMBALTA) delayed release capsule 60 mg, 60 mg, Oral, Daily, Cande Brando, DO, 60 mg at 20 0928    enoxaparin (LOVENOX) subcutaneous injection 60 mg, 1 mg/kg, Subcutaneous, Q12H [address], Sherren Pax, MD, 60 mg at 20 0931    [] gabapentin (NEURONTIN) capsule 100 mg, 100 mg, Oral, TID, 100 mg at 20 **FOLLOWED BY** gabapentin (NEURONTIN) capsule 100 mg, 100 mg, Oral, BID, 100 mg at 2033 **FOLLOWED BY** [START ON 2020] gabapentin (NEURONTIN) capsule 100 mg, 100 mg, Oral, HS, Cande Brando, DO    hydrocortisone 1 % cream, , Topical, 4x Daily PRN, Cande Michaels DO    ibuprofen (MOTRIN) tablet 600 mg, 600 mg, Oral, Q8H [address], Buddy Handy DO, 600 mg at 20 0527    iohexol (OMNIPAQUE) 240 MG/ML solution 50 mL, 50 mL, Oral, 90 min pre-procedure, Mychal Reyna DO    lidocaine (LIDODERM) 5 % patch 2 patch, 2 patch, Topical, Daily, Asha Roca PA-C, 2 patch at 20 0607    magnesium citrate (CITROMA) oral solution 296 mL, 296 mL, Oral, Daily PRN, Cristo Francis MD    melatonin tablet 6 mg, 6 mg, Oral, HS PRN, Asha Roca PA-C, 6 mg at 20 4460    methocarbamol (ROBAXIN) tablet 750 mg, 750 mg, Oral, Q6H [address], Shanta Nava MD, 750 mg at 09/04/20 1205    methylnaltrexone (RELISTOR) subcutaneous injection 12 mg, 12 mg, Subcutaneous, Q48H PRN, Shanta Nava MD    mirtazapine (REMERON) tablet 30 mg, 30 mg, Oral, HS, Mellody Nageotte, MD, 30 mg at 09/03/20 2114    morphine injection 2 mg, 2 mg, Intravenous, Q4H PRN, Delana Aase, MD, 2 mg at 09/04/20 1207    mupirocin (BACTROBAN) 2 % nasal ointment 1 application, 1 application, Nasal, P32F [address], Gilmer Harris PA-C, 1 application at 86/29/29 1206    nystatin (MYCOSTATIN) cream, , Topical, BID, Narendra Cox DO    OLANZapine (ZyPREXA) tablet 5 mg, 5 mg, Oral, HS, Belinda Stephen MD, 5 mg at 09/03/20 2114    ondansetron [facility]) injection 4 mg, 4 mg, Intravenous, Q6H PRN, Mellody Nageotte, MD, 4 mg at 09/01/20 2127    oxyCODONE (ROXICODONE) immediate release tablet 10 mg, 10 mg, Oral, Q4H PRN, BOO Mejias, 10 mg at 09/01/20 1354    oxyCODONE (ROXICODONE) IR tablet 15 mg, 15 mg, Oral, Q4H PRN, BOO Mejias, 15 mg at 09/04/20 0932    pantoprazole (PROTONIX) EC tablet 20 mg, 20 mg, Oral, Early Morning, Narendra Cox DO, 20 mg at 09/04/20 3488    saccharomyces boulardii (FLORASTOR) capsule 250 mg, 250 mg, Oral, BID, Royal Sekou MD, 250 mg at 09/04/20 3119    senna (SENOKOT) tablet 8 6 mg, 1 tablet, Oral, BID, Royal Sekou MD, 8 6 mg at 09/04/20 1677    PSH:   Past Surgical History:   Procedure Laterality Date    IR PICC LINE PLACEMENT DOUBLE LUMEN  8/26/2020    KNEE SURGERY Left     MOUTH SURGERY        History reviewed  No pertinent family history  Review of Systems   Constitutional: Positive for chills  Negative for fever  HENT: Positive for dental problem (fractured #32, carious #9)  Negative for facial swelling and trouble swallowing  Eyes: Negative for visual disturbance  Respiratory: Negative for shortness of breath  All other systems reviewed and are negative  Temp:  [97 6 °F (36 4 °C)-98 2 °F (36 8 °C)] 97 7 °F (36 5 °C)  HR:  [81-89] 87  Resp:  [18-22] 22  BP: (106-114)/(53-57) 114/56  SpO2:  [94 %-98 %] 94 %       Intake/Output Summary (Last 24 hours) at 9/4/2020 1348  Last data filed at 9/4/2020 0443  Gross per 24 hour   Intake 240 ml   Output 0 ml   Net 240 ml        Physical Exam:  GE: AAOx3  NAD  HEENT:    Head: no facial swelling  No trismus  No tenderness to palpation  No LAD  Inferior border of the mandible is palpable  Mouth: ~40mm MELODY  Dentition grossly intact, except for carious tooth #9  No vestibular swelling  No purulence  No sinus tract  FOM soft/non-tender/non-elevated  Uvula midline  Missing 3rd molars   Resp: no respiratory distress on RA  CVS: RRR    Lab Results: CBC: No results found for: WBC, HGB, HCT, MCV, PLT, ADJUSTEDWBC, MCH, MCHC, RDW, MPV, NRBC  Imaging: I have personally reviewed pertinent films in PACS Panorex reveals carious tooth #9 and retained root tips #32  No bony pathologies  EKG, Pathology, and Other Studies: I have personally reviewed pertinent reports

## 2020-09-04 NOTE — PROGRESS NOTES
Progress Note - Cardiothoracic Surgery   Marylin Chu 34 y o  female MRN: [id number]  Unit/Bed#: [facility] 526-01 Encounter: [id number]    24 Hour Events: No complaints this morning  No events overnight        Medications:   Scheduled Meds:  Current Facility-Administered Medications   Medication Dose Route Frequency Provider Last Rate    acetaminophen  650 mg Oral Q6H PRN Elieryna Cox, DO      ALPRAZolam  1 mg Oral Q4H PRN Delana Aase, MD      alteplase  2 mg Intracatheter Q12H PRN Elieryna Cox, DO      calcium acetate  667 mg Oral TID With Meals Delana Aase, MD      calcium carbonate  1,000 mg Oral Daily PRN Mellody Nageotte, MD      cefazolin  2,000 mg Intravenous Q8H Asha Roca PA-C 2,000 mg (09/04/20 0527)    dicyclomine  10 mg Oral TID PRN Delana Aase, MD      DULoxetine  60 mg Oral Daily [facility] Cox, DO      enoxaparin  1 mg/kg Subcutaneous Q12H [address] Shanta Nava MD      gabapentin  100 mg Oral BID Elieryna Cox, DO      Followed by   Chip Oden ON 9/9/2020] gabapentin  100 mg Oral HS Elia Cox, DO      hydrocortisone   Topical 4x Daily PRN Elia Cox, DO      ibuprofen  600 mg Oral [facility] Elieryna Cox, DO      iohexol  50 mL Oral 90 min pre-procedure Hetul Reyna,       lidocaine  2 patch Topical Daily Asha Roca PA-C      magnesium citrate  296 mL Oral Daily PRN Royal Sekou MD      melatonin  6 mg Oral HS PRN Asha Roca PA-C      methocarbamol  750 mg Oral Q6H [address] Shanta Nava MD      methylnaltrexone bromide  12 mg Subcutaneous Q48H PRN Shanta Nava MD      mirtazapine  30 mg Oral HS Mellody Nageotte, MD      morphine injection  2 mg Intravenous Q4H PRN Delana Aase, MD      nystatin   Topical BID Elieryna Cox, DO      OLANZapine  5 mg Oral HS Belinda Stephen MD      ondansetron  4 mg Intravenous Q6H PRN Mellody Nageotte, MD      oxyCODONE  10 mg Oral Q4H PRN BOO Mejias      oxyCODONE  15 mg Oral Q4H PRN Kiera Quevedo BOO Garza      pantoprazole  20 mg Oral Early Morning Charlie Reeder DO      saccharomyces boulardii  250 mg Oral BID Mayte Garcia MD      senna  1 tablet Oral BID Mayte Garcia MD       Continuous Infusions:     Results:   Results from last 7 days   Lab Units 08/31/20  0541   WBC Thousand/uL 5 06   HEMOGLOBIN g/dL 9 1*   HEMATOCRIT % 28 6*   PLATELETS Thousands/uL 291     Results from last 7 days   Lab Units 09/01/20  2142 08/31/20  0541   POTASSIUM mmol/L 4 0 3 9   CHLORIDE mmol/L 104 103   CO2 mmol/L 29 30   BUN mg/dL 14 11   CREATININE mg/dL 0 54* 0 44*   CALCIUM mg/dL 8 7 8 7           Studies:     Echocardiogram 9/2/2020:  SUMMARY     LEFT VENTRICLE:  Systolic function was normal  Ejection fraction was estimated to be 55 %  There were no regional wall motion abnormalities      RIGHT VENTRICLE:  The ventricle was mildly dilated  Systolic function was normal   Wall thickness was increased      RIGHT ATRIUM:  The atrium was mildly dilated      TRICUSPID VALVE:  There was severe regurgitation  There was a medium-sized, pedunculated, echogenic, fixed vegetation, measuring 14 mm x 8 mm on the anterior leaflet on the right atrial aspect  Vitals:   Vitals:    09/03/20 0900 09/03/20 1500 09/03/20 2318 09/04/20 0744   BP: 105/62 108/57 106/53 114/56   BP Location: Right arm Right arm     Pulse: 83 81 89 87   Resp: 18 18 18 22   Temp: 98 2 °F (36 8 °C) 98 2 °F (36 8 °C) 97 6 °F (36 4 °C) 97 7 °F (36 5 °C)   TempSrc: Oral Oral  Oral   SpO2:  98% 98% 94%   Weight:       Height:           Physical Exam:    HEENT/NECK:  PERRLA  No jugular venous distention  Cardiac: Regular rate and rhythm  Pulmonary:  Breath sounds clear bilaterally  Abdomen:  Non-tender and Non-distended  Extremities: Extremities warm/dry  Neuro: Alert and oriented X 3  Skin: Warm/Dry, without rashes or lesions      Assessment:  Severe tricuspid regurgitation with vegetation     Plan:  Repeat CT chest wo contrast  Panorex   Surgery next week     SIGNATURE: Wili An PA-C  DATE: September 4, 2020  TIME: 9:31 AM

## 2020-09-04 NOTE — PHYSICAL THERAPY NOTE
Physical Therapy Progress Note     09/04/20 1140   Pain Assessment   Pain Assessment Tool 0-10   Pain Score 9   Pain Location/Orientation Orientation: Right;Location: Hip   Hospital Pain Intervention(s) Repositioned; Ambulation/increased activity   Restrictions/Precautions   Other Precautions Pain   Subjective   Subjective The pt  states that she has pain, but that she is feeling better  Bed Mobility   Sit to Supine 6  Modified independent   Transfers   Sit to Stand 6  Modified independent   Stand to Sit 6  Modified independent   Ambulation/Elevation   Gait pattern Antalgic   Gait Assistance 6  Modified independent   Assistive Device Rolling walker   Distance 160 feet  Balance   Static Sitting Normal   Dynamic Sitting Normal   Static Standing Good   Ambulatory Good   Activity Tolerance   Activity Tolerance Patient tolerated treatment well;Patient limited by pain   Medical Staff Made Aware Rudy Hernandez  PT DPT  Assessment   Prognosis Good   Problem List Pain   Assessment The pt  is ambulating independently with the walker and is only limited due to pain  She was able to ambulate a short distance within the room without the walker as well  The pt  is able to independently mobilizing and walking the hallways  Will sign off  Barriers to Discharge None   Goals   Patient Goals To have less pain     STG Expiration Date 09/14/20   PT Treatment Day 1   Plan   Treatment/Interventions Functional transfer training;Patient/family training;Bed mobility;Gait training;Elevations   Progress Discontinue PT   Recommendation   PT Discharge Recommendation Return to previous environment with social support   Equipment Recommended Meagan Meneses   PT - OK to Discharge Yes     Vandana Renee, PTA

## 2020-09-04 NOTE — PROGRESS NOTES
Progress Note - Infectious Disease   Sherine Mas 34 y o  female MRN: [id number]  Unit/Bed#: [facility] 526-01 Encounter: [id number]      Impression/Plan:  1  Recurrent/persistent MSSA bacteremia with TV infective endocarditis   Blood cultures from 8/10 remain positive   Prolonged course of IV antibiotic was previously recommended, but patient has left AMA on 2 prior occasions (once at [facility] and once from [facility])    She remains hemodynamically stable despite her ongoing bacteremia   PICC culture negative so no need to change PICC   the bacteremia has cleared  -continue cefazolin at least through 9/26/2020 to complete 6 weeks from the 1st negative blood culture  -may need to extend the course of the antibiotics depending upon the operative findings/cultures  -recheck CBC with diff and CMP weekly while on the IV antibiotics     2  Tricuspid valve endocarditis, 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  7/21 MELISSA showed large vegetation on TV with severe regurgitation  7/15 CT abdomen/pelvis also showed bilateral renal parenchymal abnormalities concerning for septic emboli   No intra-abdominal abscess   Patient now having some pleuritic-type chest pain  Repeat echocardiogram with severe tricuspid regurgitation as persisting and with mildly dilated RV     -antibiotic plan as above  -patient to go to the OR next week for valve surgery  -recheck CT of the chest  -check Panorex  -CT surgery follow-up     3   Right iliacus muscle abscesses/sacroiliitis-repeat imaging without remaining abscess   MRI now with evidence of sacroiliitis that I suspect is septic   There are no destructive changes or fluid collection   Suspect this is the cause of the patient's ongoing pain   The patient's sedimentation rate and CRP or decreased  -antibiotics as above  -recheck sedimentation rate and CRP weekly while on the IV antibiotics; next checked on Monday  -pain management  -no clear indication for any surgical intervention for now     4  Recent left foot cellulitis with abscess   Likely site of injection of IV drugs versus ectopic foci in the setting of MSSA bacteremia   This appears to have healed with no evidence of active infection   -antibiotic plan as above      5  Back pain   More likely secondary to chronic pain, opioid dependence   20 Thoracic and lumbar spine MRIs performed at [facility] [place] [facility] negative were for abscess or osteomyelitis   CT scan nondiagnostic for osteomyelitis or diskitis   Sedimentation rate and CRP are decreasing  Pain is improved  -monitor back pain  -no repeat MRI lumbar spine for now  -serial exams  -acute pain service follow-up  -recheck CRP and sedimentation rate on Monday     6  Active IV heroin abuse   This is the causative factor for development of bacteremia and infective endocarditis   Patient has left AMA on prior occasions   HIV screen negative  -patient is not a candidate for at home PICC line/IV antibiotics  -acute pain service follow-up     7  Chronic HCV infection, transaminitis   Recent HIV was negative   The LFTs are waxing waning  -monitor LFTs      8  Left upper extremity DVT-in the setting of PICC line use   Patient now on anticoagulation  -vascular follow-up  -serial exam  -anticoagulation    Discussed the above management plan with the primary service    Infectious Disease service will see the patient again 2020  Please call if questions  Antibiotics:  Cefazolin restart 26  Negative blood cultures 18    Subjective:  Patient has no fever, chills, sweats; no nausea, vomiting, diarrhea; no cough, shortness of breath; no increased pain  No new symptoms  She seems in better spirits      Objective:  Vitals:  Temp:  [97 6 °F (36 4 °C)-98 2 °F (36 8 °C)] 97 7 °F (36 5 °C)  HR:  [81-89] 87  Resp:  [18-22] 22  BP: (106-114)/(53-57) 114/56  SpO2:  [94 %-98 %] 94 %  Temp (24hrs), Av 8 °F (36 6 °C), Min:97 6 °F (36 4 °C), Max:98 2 °F (36 8 °C)  Current: Temperature: 97 7 °F (36 5 °C)    Physical Exam:   General Appearance:  Alert, interactive, nontoxic, no acute distress  Throat: Oropharynx moist without lesions  Lungs:   Clear to auscultation bilaterally; no wheezes, rhonchi or rales; respirations unlabored   Heart:  RRR; no murmur, rub or gallop   Abdomen:   Soft, non-tender, non-distended, positive bowel sounds  Extremities: No clubbing, cyanosis or edema   Skin: No new rashes or lesions  No draining wounds noted         Labs, Imaging, & Other studies:   All pertinent labs and imaging studies were personally reviewed  Results from last 7 days   Lab Units 08/31/20  0541   WBC Thousand/uL 5 06   HEMOGLOBIN g/dL 9 1*   PLATELETS Thousands/uL 291     Results from last 7 days   Lab Units 09/01/20  2142 08/31/20  0541   SODIUM mmol/L 139 138   POTASSIUM mmol/L 4 0 3 9   CHLORIDE mmol/L 104 103   CO2 mmol/L 29 30   BUN mg/dL 14 11   CREATININE mg/dL 0 54* 0 44*   EGFR ml/min/1 73sq m 130 139   CALCIUM mg/dL 8 7 8 7   AST U/L 147* 147*   ALT U/L 72 56   ALK PHOS U/L 284* 229*             Results from last 7 days   Lab Units 08/31/20  0540   CRP mg/L 14 3*

## 2020-09-04 NOTE — ASSESSMENT & PLAN NOTE
· Patient was complaining of severe back pain mainly in the lower part of the back and also in the sacral region  · Patient had MRI of the lumbar and thoracic spine [place] [facility] was unremarkable  · - continue Cymbalta 30 mg daily - increased to 60 mg daily on 9/1  · - continue robaxin to 750 mg q 6  · - continue oxy 10 mg and 15 mg  prn  · - continue iv morphine - decrease to 2q4 prn today  · -continue tylenol prn - refused ATC  · - continue lidocaine patch if pt does not refuse  · -continue gabapentin to 100 TID - decrease to BID tomorrow  · add ibuprofen RTC and ppi for GI ppx  · Iliacus muscle abscess is noted on CT scan, continue antibiotic treatment as above, no indication for drainage at this time  · Repeat CT scan shows improvement in septic emboli to iliacus muscle  · ketamine infusion discontinued  · Patient has been reluctant with interventions and procedures  She cites that she wants IV pain meds before intervention  Extensive discussion regarding acute pain service recommendations  Will proceed with following recommendations by acute pain service  · MRI completed 8/30 shows sacroiliitis which ID recs against surgical intervention    ID recs weekly CRP and ESR, which are trending down   · Hold off on thoracolumbar MRI as pain in right hip and prior MRI unremarkable  · Opioid, benzo, and Neurontin taper scanned into media  · Neurontin taper already ordered  · Will have to follow taper as pt cannot be d/c on controlled substances and pain will need to be controlled w/ Cymbalta, Robaxin, ibuprofen, and tylenol

## 2020-09-04 NOTE — ASSESSMENT & PLAN NOTE
· Patient noted to have abnormalities seen in the CT of the chest abdomen and pelvis concerning for septic emboli  · There is pleural effusion on the CT scan of the chest and also on repeat chest x-ray  · Patient is rather asymptomatic from pulmonary standpoint  · Continue with the antibiotics  · Thoracic surgery have evaluated the pt and she is not short of breath   · 8/15-imaging right hip shows no osseous involvement; concern for proximity of abscess status SI joint, but no SI joint involvement at this time  · Management as above under hip pain  · c/o pleuritic CP  CXR shows no acute finding    Suspect pain could be related to septic emboli  · Chest CT performed preoperatively today shows resolution of right pleural effusion and improvement of several of the previous opacities with some new opacities which probably are post infectious/inflammatory resolving changes

## 2020-09-04 NOTE — ASSESSMENT & PLAN NOTE
· Patient noted to have tricuspid while vegetation on echocardiogram done in July  Patient had a transthoracic and also transesophageal echocardiogram done during the last hospital stay  · With septic emboli to lungs and posterior iliacus muscle  · Patient is transferred over here to be evaluated by CT surgery  · She was noncommittal to abstain from illegal drugs or unintended use of medications  · When she continues to abstain and completes iv abx treatment they would consider surgical correction - this will likely be as OP   · Continue IV cefazolin as above  · repeat echo today per ID - shows to severe TR, 14 x 8 mm mobile vegetation on tricuspid valve  No pericardial effusion, midly dilated RV (not mentioned before)  · Reconsulted CT surgery who recommend tricuspid valve replacement  Preoperative investigations being performed, OR next week    Patient understands that after this wall replacement if she ends up being back on drugs and gets it in the affected she will not get another surgery

## 2020-09-04 NOTE — PLAN OF CARE
Problem: Prexisting or High Potential for Compromised Skin Integrity  Goal: Skin integrity is maintained or improved  Description: INTERVENTIONS:  - Identify patients at risk for skin breakdown  - Assess and monitor skin integrity  - Assess and monitor nutrition and hydration status  - Monitor labs   - Assess for incontinence   - Turn and reposition patient  - Assist with mobility/ambulation  - Relieve pressure over bony prominences  - Avoid friction and shearing  - Provide appropriate hygiene as needed including keeping skin clean and dry  - Evaluate need for skin moisturizer/barrier cream  - Collaborate with interdisciplinary team   - Patient/family teaching  - Consider wound care consult   Outcome: Progressing     Problem: PAIN - ADULT  Goal: Verbalizes/displays adequate comfort level or baseline comfort level  Description: Interventions:  - Encourage patient to monitor pain and request assistance  - Assess pain using appropriate pain scale  - Administer analgesics based on type and severity of pain and evaluate response  - Implement non-pharmacological measures as appropriate and evaluate response  - Consider cultural and social influences on pain and pain management  - Notify physician/advanced practitioner if interventions unsuccessful or patient reports new pain  Outcome: Progressing     Problem: INFECTION - ADULT  Goal: Absence or prevention of progression during hospitalization  Description: INTERVENTIONS:  - Assess and monitor for signs and symptoms of infection  - Monitor lab/diagnostic results  - Monitor all insertion sites, i e  indwelling lines, tubes, and drains  - Monitor endotracheal if appropriate and nasal secretions for changes in amount and color  - [place] appropriate cooling/warming therapies per order  - Administer medications as ordered  - Instruct and encourage patient and family to use good hand hygiene technique  - Identify and instruct in appropriate isolation precautions for identified infection/condition  Outcome: Progressing  Goal: Absence of fever/infection during neutropenic period  Description: INTERVENTIONS:  - Monitor WBC    Outcome: Progressing     Problem: SAFETY ADULT  Goal: Patient will remain free of falls  Description: INTERVENTIONS:  - Assess patient frequently for physical needs  -  Identify cognitive and physical deficits and behaviors that affect risk of falls    -  [place] fall precautions as indicated by assessment   - Educate patient/family on patient safety including physical limitations  - Instruct patient to call for assistance with activity based on assessment  - Modify environment to reduce risk of injury  - Consider OT/PT consult to assist with strengthening/mobility  Outcome: Progressing  Goal: Maintain or return to baseline ADL function  Description: INTERVENTIONS:  -  Assess patient's ability to carry out ADLs; assess patient's baseline for ADL function and identify physical deficits which impact ability to perform ADLs (bathing, care of mouth/teeth, toileting, grooming, dressing, etc )  - Assess/evaluate cause of self-care deficits   - Assess range of motion  - Assess patient's mobility; develop plan if impaired  - Assess patient's need for assistive devices and provide as appropriate  - Encourage maximum independence but intervene and supervise when necessary  - Involve family in performance of ADLs  - Assess for home care needs following discharge   - Consider OT consult to assist with ADL evaluation and planning for discharge  - Provide patient education as appropriate  Outcome: Progressing  Goal: Maintain or return mobility status to optimal level  Description: INTERVENTIONS:  - Assess patient's baseline mobility status (ambulation, transfers, stairs, etc )    - Identify cognitive and physical deficits and behaviors that affect mobility  - Identify mobility aids required to assist with transfers and/or ambulation (gait belt, sit-to-stand, lift, walker, cane, etc )  - [place] fall precautions as indicated by assessment  - Record patient progress and toleration of activity level on Mobility SBAR; progress patient to next Phase/Stage  - Instruct patient to call for assistance with activity based on assessment  - Consider rehabilitation consult to assist with strengthening/weightbearing, etc   Outcome: Progressing     Problem: CARDIOVASCULAR - ADULT  Goal: Maintains optimal cardiac output and hemodynamic stability  Description: INTERVENTIONS:  - Monitor I/O, vital signs and rhythm  - Monitor for S/S and trends of decreased cardiac output  - Administer and titrate ordered vasoactive medications to optimize hemodynamic stability  - Assess quality of pulses, skin color and temperature  - Assess for signs of decreased coronary artery perfusion  - Instruct patient to report change in severity of symptoms  Outcome: Progressing  Goal: Absence of cardiac dysrhythmias or at baseline rhythm  Description: INTERVENTIONS:  - Continuous cardiac monitoring, vital signs, obtain 12 lead EKG if ordered  - Administer antiarrhythmic and heart rate control medications as ordered  - Monitor electrolytes and administer replacement therapy as ordered  Outcome: Progressing     Problem: METABOLIC, FLUID AND ELECTROLYTES - ADULT  Goal: Electrolytes maintained within normal limits  Description: INTERVENTIONS:  - Monitor labs and assess patient for signs and symptoms of electrolyte imbalances  - Administer electrolyte replacement as ordered  - Monitor response to electrolyte replacements, including repeat lab results as appropriate  - Instruct patient on fluid and nutrition as appropriate  Outcome: Progressing  Goal: Fluid balance maintained  Description: INTERVENTIONS:  - Monitor labs   - Monitor I/O and WT  - Instruct patient on fluid and nutrition as appropriate  - Assess for signs & symptoms of volume excess or deficit  Outcome: Progressing  Goal: Glucose maintained within target range  Description: INTERVENTIONS:  - Monitor Blood Glucose as ordered  - Assess for signs and symptoms of hyperglycemia and hypoglycemia  - Administer ordered medications to maintain glucose within target range  - Assess nutritional intake and initiate nutrition service referral as needed  Outcome: Progressing     Problem: HEMATOLOGIC - ADULT  Goal: Maintains hematologic stability  Description: INTERVENTIONS  - Assess for signs and symptoms of bleeding or hemorrhage  - Monitor labs  - Administer supportive blood products/factors as ordered and appropriate  Outcome: Progressing     Problem: Potential for Falls  Goal: Patient will remain free of falls  Description: INTERVENTIONS:  - Assess patient frequently for physical needs  -  Identify cognitive and physical deficits and behaviors that affect risk of falls  -  [place] fall precautions as indicated by assessment   - Educate patient/family on patient safety including physical limitations  - Instruct patient to call for assistance with activity based on assessment  - Modify environment to reduce risk of injury  - Consider OT/PT consult to assist with strengthening/mobility  Outcome: Progressing     Problem: Nutrition/Hydration-ADULT  Goal: Nutrient/Hydration intake appropriate for improving, restoring or maintaining nutritional needs  Description: Monitor and assess patient's nutrition/hydration status for malnutrition  Collaborate with interdisciplinary team and initiate plan and interventions as ordered  Monitor patient's weight and dietary intake as ordered or per policy  Utilize nutrition screening tool and intervene as necessary  Determine patient's food preferences and provide high-protein, high-caloric foods as appropriate       INTERVENTIONS:  - Monitor oral intake, urinary output, labs, and treatment plans  - Assess nutrition and hydration status and recommend course of action  - Evaluate amount of meals eaten  - Assist patient with eating if necessary - Allow adequate time for meals  - Recommend/ encourage appropriate diets, oral nutritional supplements, and vitamin/mineral supplements  - Order, calculate, and assess calorie counts as needed  - Recommend, monitor, and adjust tube feedings and TPN/PPN based on assessed needs  - Assess need for intravenous fluids  - Provide specific nutrition/hydration education as appropriate  - Include patient/family/caregiver in decisions related to nutrition  Outcome: Progressing     Problem: Knowledge Deficit  Goal: Patient/family/caregiver demonstrates understanding of disease process, treatment plan, medications, and discharge instructions  Description: Complete learning assessment and assess knowledge base    Interventions:  - Provide teaching at level of understanding  - Provide teaching via preferred learning methods  Outcome: Progressing

## 2020-09-04 NOTE — ASSESSMENT & PLAN NOTE
· Recurrent MSSA bacteremia  · Initially admitted to [facility] from 07/15-blood culture were positive for MSSA bacteremia, but patient signed AMA on 07/22  Her repeat blood culture done on 7/21 was negative after 5 days  · [facility] on 07/30-blood culture came back positive for MSSA and she left again is medical advice on 08/04  Her blood cultures came back positive on 07/30, but the bacteremia cleared as of 8 /2  Patient left AMA on 08/04  · Readmitted to [address] [place] [address] on 08/10-blood culture came back positive for Staph aureus  · 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

## 2020-09-04 NOTE — PROGRESS NOTES
Progress Note - Sony Mccann 1990, 34 y o  female MRN: [id number]    Unit/Bed#: [facility] 526-01 Encounter: [id number]    Primary Care Provider: Virginia Mayer PA-C   Date and time admitted to hospital: 2020  7:39 PM        Transaminitis  Assessment & Plan  Check acute hep panel - pt reports hx of Hep C  Positive for hep C antibodies  AST and ALP continue to be up and down    DVT of axillary vein, acute left [facility])  Assessment & Plan  Increase lovenox to 60mg Q12h  Due to severe pain consulted with vascular surgery for treatment options - appreciated  Picc continues to function well and post recent blood culture is negative  Elevation of LUE  PICC team attempted to move picc line to right arm given left arm DVT pain  Unsuccessful  IR consulted   IR exchanged PICC   Needs 3 months of AC - can d/c on NOAC if affordable    Intravenous drug abuse [facility])  Assessment & Plan  · Patient with history of drug abuse and likely the source of bacteremia  · Patient has a history of signing against medical advise  · May benefit from drug rehab eventually if she becomes agreeable to host services  She was not interested during my conversation  · During the hospital stay in July of this year patient was positive for [facility] ,cocaine and opiates  Although pt previosuly reported to Dr Vandana Baez that she had not used any drugs for several years now, but admitted to Dr Africa Barbour that she was actively abusing cocaine and heroin  · She swears to me after her boyfriend  that she doesn't want to use drugs & get high again    Right hip pain  Assessment & Plan  · Patient was complaining of severe back pain mainly in the lower part of the back and also in the sacral region  · Patient had MRI of the lumbar and thoracic spine during the recent hospital stay in [facility] which was unremarkable      · - continue Cymbalta 30 mg daily - increased to 60 mg daily on   · - continue robaxin to 750 mg q 6  · - continue oxy 10 mg and 15 mg  prn  · - continue iv morphine - decrease to 2q4 prn today  · -continue tylenol prn - refused ATC  · - continue lidocaine patch if pt does not refuse  · -continue gabapentin to 100 TID - decrease to BID tomorrow  · add ibuprofen RTC and ppi for GI ppx  · Iliacus muscle abscess is noted on CT scan, continue antibiotic treatment as above, no indication for drainage at this time  · Repeat CT scan shows improvement in septic emboli to iliacus muscle  · ketamine infusion discontinued  · Patient has been reluctant with interventions and procedures  She cites that she wants IV pain meds before intervention  Extensive discussion regarding acute pain service recommendations  Will proceed with following recommendations by acute pain service  · MRI completed 8/30 shows sacroiliitis which ID recs against surgical intervention  ID recs weekly CRP and ESR, which are trending down   · Hold off on thoracolumbar MRI as pain in right hip and prior MRI unremarkable  · Opioid, benzo, and Neurontin taper scanned into media  · Neurontin taper already ordered  · Will have to follow taper as pt cannot be d/c on controlled substances and pain will need to be controlled w/ Cymbalta, Robaxin, ibuprofen, and tylenol    Bipolar 1 disorder ([facility] )  Assessment & Plan  · Patient 8/27 agreed to psych eval which is apprecaited  · Psych added Zyprexa  · C/w Cymbalta and Remeron  · So far has been on p r n  IV Ativan  She states that it is not working for her    Getting panic attacks after passing away of her boyfriend about 2 days ago  · She discuss this was with Pain Service recommends changing her from IV Ativan to oral Xanax 1 mg q 4 hours p r n   Ordered that    Septic embolism ([facility] )  Assessment & Plan  · Patient noted to have abnormalities seen in the CT of the chest abdomen and pelvis concerning for septic emboli  · There is pleural effusion on the CT scan of the chest and also on repeat chest x-ray  · Patient is rather asymptomatic from pulmonary standpoint  · Continue with the antibiotics  · Thoracic surgery have evaluated the pt and she is not short of breath   · 8/15-imaging right hip shows no osseous involvement; concern for proximity of abscess status SI joint, but no SI joint involvement at this time  · Management as above under hip pain  · c/o pleuritic CP  CXR shows no acute finding  Suspect pain could be related to septic emboli  · Chest CT performed preoperatively today shows resolution of right pleural effusion and improvement of several of the previous opacities with some new opacities which probably are post infectious/inflammatory resolving changes      Acute bacterial endocarditis  Assessment & Plan  · Patient noted to have tricuspid while vegetation on echocardiogram done in July  Patient had a transthoracic and also transesophageal echocardiogram done during the last hospital stay  · With septic emboli to lungs and posterior iliacus muscle  · Patient is transferred over here to be evaluated by CT surgery  · She was noncommittal to abstain from illegal drugs or unintended use of medications  · When she continues to abstain and completes iv abx treatment they would consider surgical correction - this will likely be as OP   · Continue IV cefazolin as above  · repeat echo today per ID - shows to severe TR, 14 x 8 mm mobile vegetation on tricuspid valve  No pericardial effusion, midly dilated RV (not mentioned before)  · Reconsulted CT surgery who recommend tricuspid valve replacement  Preoperative investigations being performed, OR next week  Patient understands that after this wall replacement if she ends up being back on drugs and gets it in the affected she will not get another surgery    * Bacteremia due to Staphylococcus aureus  Assessment & Plan  · Recurrent MSSA bacteremia    · Initially admitted to [facility] from 07/15-blood culture were positive for MSSA bacteremia, but patient signed AMA on 07/22  Her repeat blood culture done on 7/21 was negative after 5 days  · [facility] on 07/30-blood culture came back positive for MSSA and she left again is medical advice on 08/04  Her blood cultures came back positive on 07/30, but the bacteremia cleared as of 8 /2  Patient left AMA on 08/04  · Readmitted to [address] [place] [address] on 08/10-blood culture came back positive for Staph aureus  · 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 during the last hospital stay from 7/15-7/22  · Plan is to continue IV cefazolin for a 6 week course per ID recommendations from 8/15 when B Cx became neg till 9/26  · 8/26 removed PICC and PICC cx neg  · B Cx from 8/26 neg      [facility] Internal Medicine Progress Note  Patient: Verna Quintanilla 34 y o  female   MRN: [id number]  PCP: Alicia Charles PA-C  Unit/Bed#: [facility] 526-01 Encounter: [id number]  Date Of Visit: 09/04/20    Assessment:    Principal Problem:    Bacteremia due to Staphylococcus aureus  Active Problems:    Acute bacterial endocarditis    Septic embolism (HCC)    Bipolar 1 disorder ([facility] )    Right hip pain    Intravenous drug abuse ([facility] )    DVT of axillary vein, acute left (HCC)    Transaminitis      Plan:       VTE Pharmacologic Prophylaxis:   Pharmacologic: Enoxaparin (Lovenox)  Mechanical VTE Prophylaxis in Place: Yes    Patient Centered Rounds: I have performed bedside rounds with nursing staff today  Discussions with Specialists or Other Care Team Provider: APS    Education and Discussions with Family / Patient: pateint    Time Spent for Care: 30 minutes  More than 50% of total time spent on counseling and coordination of care as described above      Current Length of Stay: 23 day(s)    Current Patient Status: Inpatient   Certification Statement: The patient will continue to require additional inpatient hospital stay due to not ready    Discharge Plan / Estimated Discharge Date:     Code Status: Level 1 - Full Code      Subjective:   Feels okay, continues to state that Xanax is not helping her much  Gets subxiphoid pain with coughing    Objective:     Vitals:   Temp (24hrs), Av 7 °F (36 5 °C), Min:97 6 °F (36 4 °C), Max:97 7 °F (36 5 °C)    Temp:  [97 6 °F (36 4 °C)-97 7 °F (36 5 °C)] 97 7 °F (36 5 °C)  HR:  [87-89] 87  Resp:  [18-22] 22  BP: (106-114)/(53-56) 114/56  SpO2:  [94 %-98 %] 94 %  Body mass index is 23 3 kg/m²  Input and Output Summary (last 24 hours): Intake/Output Summary (Last 24 hours) at 2020 1740  Last data filed at 2020 1300  Gross per 24 hour   Intake 1340 ml   Output 0 ml   Net 1340 ml       Physical Exam:     Physical Exam  Vitals signs reviewed  HENT:      Head: Normocephalic and atraumatic  Cardiovascular:      Rate and Rhythm: Normal rate and regular rhythm  Pulmonary:      Effort: Pulmonary effort is normal  No respiratory distress  Breath sounds: Normal breath sounds  No wheezing  Abdominal:      General: There is no distension  Palpations: Abdomen is soft  Tenderness: There is no abdominal tenderness  Neurological:      Mental Status: She is alert and oriented to person, place, and time  Additional Data:     Labs:    Results from last 7 days   Lab Units 20  0541   WBC Thousand/uL 5 06   HEMOGLOBIN g/dL 9 1*   HEMATOCRIT % 28 6*   PLATELETS Thousands/uL 291   NEUTROS PCT % 39*   LYMPHS PCT % 41   MONOS PCT % 10   EOS PCT % 8*     Results from last 7 days   Lab Units 20  2142   POTASSIUM mmol/L 4 0   CHLORIDE mmol/L 104   CO2 mmol/L 29   BUN mg/dL 14   CREATININE mg/dL 0 54*   CALCIUM mg/dL 8 7   ALK PHOS U/L 284*   ALT U/L 72   AST U/L 147*           * I Have Reviewed All Lab Data Listed Above  * Additional Pertinent Lab Tests Reviewed:  All Labs Within Last 24 Hours Reviewed    Imaging:    Imaging Reports Reviewed Today Include:   Imaging Personally Reviewed by Myself Includes:      Recent Cultures (last 7 days):            Last 24 Hours Medication List:   Current Facility-Administered Medications   Medication Dose Route Frequency Provider Last Rate    acetaminophen  650 mg Oral Q6H PRN Sharchristine Perks, DO      ALPRAZolam  1 mg Oral Q4H PRN Bri Jacobs MD      alteplase  2 mg Intracatheter Q12H PRN Sharion Perks, DO      calcium acetate  667 mg Oral TID With Meals Bri Jacobs MD      calcium carbonate  1,000 mg Oral Daily PRN Erin Mulligan MD      cefazolin  2,000 mg Intravenous Q8H Asha Roca PA-C 2,000 mg (09/04/20 1407)    chlorhexidine  15 mL [place], [place]      dicyclomine  10 mg Oral TID PRN Bri Jacobs MD      DULoxetine  60 mg Oral Daily Sharion Perks, DO      enoxaparin  1 mg/kg Subcutaneous Q12H [facility] Camila Dunn MD      gabapentin  100 mg Oral BID Janie Forrester, DO      Followed by   David Linda ON 9/9/2020] gabapentin  100 mg Oral HS Sharion Perks, DO      hydrocortisone   Topical 4x Daily PRN Sharchristine Perks, DO      ibuprofen  600 mg Oral [facility] Sharchristine Perks, DO      iohexol  50 mL Oral 90 min pre-procedure Mychal Reyna DO      lidocaine  2 patch Topical Daily Asha Roca PA-C      magnesium citrate  296 mL Oral Daily PRN Ronnell Crespo MD      melatonin  6 mg Oral HS PRN Asha Roca PA-C      methocarbamol  750 mg Oral Q6H [facility] Camila Dunn MD      methylnaltrexone bromide  12 mg Subcutaneous Q48H PRN Camila Dunn MD      mirtazapine  30 mg Oral HS Erin Mulligan MD      morphine injection  2 mg Intravenous Q4H PRN Bri Jacobs MD      mupirocin  1 application Nasal J60C [facility] [place], [place]      nystatin   Topical BID Sharion Perks, DO      OLANZapine  5 mg Oral HS Dona Bonilla MD      ondansetron  4 mg Intravenous Q6H PRN Erin Mulligan MD      oxyCODONE  10 mg Oral Q4H PRN BOO Valdes      oxyCODONE  15 mg Oral Q4H PRN BOO Valdes      pantoprazole  20 mg Oral Early Jacky Davidson DO      saccharomyces boulardii  250 mg Oral BID Kena Harrison MD      senna  1 tablet Oral BID Kena Harrison MD          Today, Patient Was Seen By: Ledy Atkinson MD    ** Please Note: This note has been constructed using a voice recognition system   **

## 2020-09-04 NOTE — PLAN OF CARE
Problem: PHYSICAL THERAPY ADULT  Goal: Performs mobility at highest level of function for planned discharge setting  See evaluation for individualized goals  Description: Treatment/Interventions: Functional transfer training, LE strengthening/ROM, Therapeutic exercise, Endurance training, Patient/family training, Equipment eval/education, Bed mobility, Spoke to nursing  Equipment Recommended: (TBD)       See flowsheet documentation for full assessment, interventions and recommendations    Outcome: Completed

## 2020-09-04 NOTE — PLAN OF CARE
Problem: Prexisting or High Potential for Compromised Skin Integrity  Goal: Skin integrity is maintained or improved  Description: INTERVENTIONS:  - Identify patients at risk for skin breakdown  - Assess and monitor skin integrity  - Assess and monitor nutrition and hydration status  - Monitor labs   - Assess for incontinence   - Turn and reposition patient  - Assist with mobility/ambulation  - Relieve pressure over bony prominences  - Avoid friction and shearing  - Provide appropriate hygiene as needed including keeping skin clean and dry  - Evaluate need for skin moisturizer/barrier cream  - Collaborate with interdisciplinary team   - Patient/family teaching  - Consider wound care consult   Outcome: Progressing     Problem: PAIN - ADULT  Goal: Verbalizes/displays adequate comfort level or baseline comfort level  Description: Interventions:  - Encourage patient to monitor pain and request assistance  - Assess pain using appropriate pain scale  - Administer analgesics based on type and severity of pain and evaluate response  - Implement non-pharmacological measures as appropriate and evaluate response  - Consider cultural and social influences on pain and pain management  - Notify physician/advanced practitioner if interventions unsuccessful or patient reports new pain  Outcome: Progressing     Problem: INFECTION - ADULT  Goal: Absence or prevention of progression during hospitalization  Description: INTERVENTIONS:  - Assess and monitor for signs and symptoms of infection  - Monitor lab/diagnostic results  - Monitor all insertion sites, i e  indwelling lines, tubes, and drains  - Monitor endotracheal if appropriate and nasal secretions for changes in amount and color  - [place] appropriate cooling/warming therapies per order  - Administer medications as ordered  - Instruct and encourage patient and family to use good hand hygiene technique  - Identify and instruct in appropriate isolation precautions for identified infection/condition  Outcome: Progressing  Goal: Absence of fever/infection during neutropenic period  Description: INTERVENTIONS:  - Monitor WBC    Outcome: Progressing     Problem: SAFETY ADULT  Goal: Patient will remain free of falls  Description: INTERVENTIONS:  - Assess patient frequently for physical needs  -  Identify cognitive and physical deficits and behaviors that affect risk of falls    -  [place] fall precautions as indicated by assessment   - Educate patient/family on patient safety including physical limitations  - Instruct patient to call for assistance with activity based on assessment  - Modify environment to reduce risk of injury  - Consider OT/PT consult to assist with strengthening/mobility  Outcome: Progressing  Goal: Maintain or return to baseline ADL function  Description: INTERVENTIONS:  -  Assess patient's ability to carry out ADLs; assess patient's baseline for ADL function and identify physical deficits which impact ability to perform ADLs (bathing, care of mouth/teeth, toileting, grooming, dressing, etc )  - Assess/evaluate cause of self-care deficits   - Assess range of motion  - Assess patient's mobility; develop plan if impaired  - Assess patient's need for assistive devices and provide as appropriate  - Encourage maximum independence but intervene and supervise when necessary  - Involve family in performance of ADLs  - Assess for home care needs following discharge   - Consider OT consult to assist with ADL evaluation and planning for discharge  - Provide patient education as appropriate  Outcome: Progressing  Goal: Maintain or return mobility status to optimal level  Description: INTERVENTIONS:  - Assess patient's baseline mobility status (ambulation, transfers, stairs, etc )    - Identify cognitive and physical deficits and behaviors that affect mobility  - Identify mobility aids required to assist with transfers and/or ambulation (gait belt, sit-to-stand, lift, walker, cane, etc )  - [place] fall precautions as indicated by assessment  - Record patient progress and toleration of activity level on Mobility SBAR; progress patient to next Phase/Stage  - Instruct patient to call for assistance with activity based on assessment  - Consider rehabilitation consult to assist with strengthening/weightbearing, etc   Outcome: Progressing     Problem: CARDIOVASCULAR - ADULT  Goal: Maintains optimal cardiac output and hemodynamic stability  Description: INTERVENTIONS:  - Monitor I/O, vital signs and rhythm  - Monitor for S/S and trends of decreased cardiac output  - Administer and titrate ordered vasoactive medications to optimize hemodynamic stability  - Assess quality of pulses, skin color and temperature  - Assess for signs of decreased coronary artery perfusion  - Instruct patient to report change in severity of symptoms  Outcome: Progressing  Goal: Absence of cardiac dysrhythmias or at baseline rhythm  Description: INTERVENTIONS:  - Continuous cardiac monitoring, vital signs, obtain 12 lead EKG if ordered  - Administer antiarrhythmic and heart rate control medications as ordered  - Monitor electrolytes and administer replacement therapy as ordered  Outcome: Progressing     Problem: METABOLIC, FLUID AND ELECTROLYTES - ADULT  Goal: Electrolytes maintained within normal limits  Description: INTERVENTIONS:  - Monitor labs and assess patient for signs and symptoms of electrolyte imbalances  - Administer electrolyte replacement as ordered  - Monitor response to electrolyte replacements, including repeat lab results as appropriate  - Instruct patient on fluid and nutrition as appropriate  Outcome: Progressing  Goal: Fluid balance maintained  Description: INTERVENTIONS:  - Monitor labs   - Monitor I/O and WT  - Instruct patient on fluid and nutrition as appropriate  - Assess for signs & symptoms of volume excess or deficit  Outcome: Progressing  Goal: Glucose maintained within target range  Description: INTERVENTIONS:  - Monitor Blood Glucose as ordered  - Assess for signs and symptoms of hyperglycemia and hypoglycemia  - Administer ordered medications to maintain glucose within target range  - Assess nutritional intake and initiate nutrition service referral as needed  Outcome: Progressing     Problem: HEMATOLOGIC - ADULT  Goal: Maintains hematologic stability  Description: INTERVENTIONS  - Assess for signs and symptoms of bleeding or hemorrhage  - Monitor labs  - Administer supportive blood products/factors as ordered and appropriate  Outcome: Progressing     Problem: Potential for Falls  Goal: Patient will remain free of falls  Description: INTERVENTIONS:  - Assess patient frequently for physical needs  -  Identify cognitive and physical deficits and behaviors that affect risk of falls  -  [place] fall precautions as indicated by assessment   - Educate patient/family on patient safety including physical limitations  - Instruct patient to call for assistance with activity based on assessment  - Modify environment to reduce risk of injury  - Consider OT/PT consult to assist with strengthening/mobility  Outcome: Progressing     Problem: Nutrition/Hydration-ADULT  Goal: Nutrient/Hydration intake appropriate for improving, restoring or maintaining nutritional needs  Description: Monitor and assess patient's nutrition/hydration status for malnutrition  Collaborate with interdisciplinary team and initiate plan and interventions as ordered  Monitor patient's weight and dietary intake as ordered or per policy  Utilize nutrition screening tool and intervene as necessary  Determine patient's food preferences and provide high-protein, high-caloric foods as appropriate       INTERVENTIONS:  - Monitor oral intake, urinary output, labs, and treatment plans  - Assess nutrition and hydration status and recommend course of action  - Evaluate amount of meals eaten  - Assist patient with eating if necessary - Allow adequate time for meals  - Recommend/ encourage appropriate diets, oral nutritional supplements, and vitamin/mineral supplements  - Order, calculate, and assess calorie counts as needed  - Recommend, monitor, and adjust tube feedings and TPN/PPN based on assessed needs  - Assess need for intravenous fluids  - Provide specific nutrition/hydration education as appropriate  - Include patient/family/caregiver in decisions related to nutrition  Outcome: Progressing     Problem: Knowledge Deficit  Goal: Patient/family/caregiver demonstrates understanding of disease process, treatment plan, medications, and discharge instructions  Description: Complete learning assessment and assess knowledge base    Interventions:  - Provide teaching at level of understanding  - Provide teaching via preferred learning methods  Outcome: Progressing

## 2020-09-04 NOTE — ASSESSMENT & PLAN NOTE
· Patient with history of drug abuse and likely the source of bacteremia  · Patient has a history of signing against medical advise  · May benefit from drug rehab eventually if she becomes agreeable to host services  She was not interested during my conversation  · During the hospital stay in July of this year patient was positive for [facility] ,cocaine and opiates    Although pt previosuly reported to Dr Annette Mcgowan that she had not used any drugs for several years now, but admitted to Dr Akshat Cadena that she was actively abusing cocaine and heroin  · She swears to me after her boyfriend  that she doesn't want to use drugs & get high again

## 2020-09-05 PROBLEM — R21 RASH: Status: ACTIVE | Noted: 2020-09-05

## 2020-09-05 LAB
ANION GAP SERPL CALCULATED.3IONS-SCNC: 5 MMOL/L (ref 4–13)
BASOPHILS # BLD AUTO: 0.02 THOUSANDS/ΜL (ref 0–0.1)
BASOPHILS NFR BLD AUTO: 0 % (ref 0–1)
BUN SERPL-MCNC: 17 MG/DL (ref 5–25)
CALCIUM SERPL-MCNC: 8.5 MG/DL (ref 8.3–10.1)
CHLORIDE SERPL-SCNC: 107 MMOL/L (ref 100–108)
CO2 SERPL-SCNC: 28 MMOL/L (ref 21–32)
CREAT SERPL-MCNC: 0.45 MG/DL (ref 0.6–1.3)
EOSINOPHIL # BLD AUTO: 0.77 THOUSAND/ΜL (ref 0–0.61)
EOSINOPHIL NFR BLD AUTO: 13 % (ref 0–6)
ERYTHROCYTE [DISTWIDTH] IN BLOOD BY AUTOMATED COUNT: 15.1 % (ref 11.6–15.1)
GFR SERPL CREATININE-BSD FRML MDRD: 138 ML/MIN/1.73SQ M
GLUCOSE SERPL-MCNC: 104 MG/DL (ref 65–140)
HCT VFR BLD AUTO: 30.4 % (ref 34.8–46.1)
HGB BLD-MCNC: 9.5 G/DL (ref 11.5–15.4)
IMM GRANULOCYTES # BLD AUTO: 0.05 THOUSAND/UL (ref 0–0.2)
IMM GRANULOCYTES NFR BLD AUTO: 1 % (ref 0–2)
LYMPHOCYTES # BLD AUTO: 2.78 THOUSANDS/ΜL (ref 0.6–4.47)
LYMPHOCYTES NFR BLD AUTO: 46 % (ref 14–44)
MCH RBC QN AUTO: 27.3 PG (ref 26.8–34.3)
MCHC RBC AUTO-ENTMCNC: 31.3 G/DL (ref 31.4–37.4)
MCV RBC AUTO: 87 FL (ref 82–98)
MONOCYTES # BLD AUTO: 0.51 THOUSAND/ΜL (ref 0.17–1.22)
MONOCYTES NFR BLD AUTO: 9 % (ref 4–12)
MRSA NOSE QL CULT: NORMAL
NEUTROPHILS # BLD AUTO: 1.85 THOUSANDS/ΜL (ref 1.85–7.62)
NEUTS SEG NFR BLD AUTO: 31 % (ref 43–75)
NRBC BLD AUTO-RTO: 0 /100 WBCS
PLATELET # BLD AUTO: 273 THOUSANDS/UL (ref 149–390)
PMV BLD AUTO: 8.6 FL (ref 8.9–12.7)
POTASSIUM SERPL-SCNC: 3.7 MMOL/L (ref 3.5–5.3)
RBC # BLD AUTO: 3.48 MILLION/UL (ref 3.81–5.12)
SODIUM SERPL-SCNC: 140 MMOL/L (ref 136–145)
WBC # BLD AUTO: 5.98 THOUSAND/UL (ref 4.31–10.16)

## 2020-09-05 PROCEDURE — 99232 SBSQ HOSP IP/OBS MODERATE 35: CPT | Performed by: PHYSICIAN ASSISTANT

## 2020-09-05 PROCEDURE — 99232 SBSQ HOSP IP/OBS MODERATE 35: CPT | Performed by: HOSPITALIST

## 2020-09-05 PROCEDURE — 85025 COMPLETE CBC W/AUTO DIFF WBC: CPT | Performed by: HOSPITALIST

## 2020-09-05 PROCEDURE — 80048 BASIC METABOLIC PNL TOTAL CA: CPT | Performed by: HOSPITALIST

## 2020-09-05 PROCEDURE — 93880 EXTRACRANIAL BILAT STUDY: CPT | Performed by: SURGERY

## 2020-09-05 RX ADMIN — METHOCARBAMOL TABLETS 750 MG: 750 TABLET, COATED ORAL at 17:19

## 2020-09-05 RX ADMIN — DULOXETINE HYDROCHLORIDE 60 MG: 60 CAPSULE, DELAYED RELEASE ORAL at 10:08

## 2020-09-05 RX ADMIN — MORPHINE SULFATE 2 MG: 2 INJECTION, SOLUTION INTRAMUSCULAR; INTRAVENOUS at 19:05

## 2020-09-05 RX ADMIN — METHOCARBAMOL TABLETS 750 MG: 750 TABLET, COATED ORAL at 12:30

## 2020-09-05 RX ADMIN — ENOXAPARIN SODIUM 60 MG: 60 INJECTION SUBCUTANEOUS at 21:28

## 2020-09-05 RX ADMIN — MICONAZOLE NITRATE: 2 CREAM TOPICAL at 19:10

## 2020-09-05 RX ADMIN — OXYCODONE HYDROCHLORIDE 15 MG: 10 TABLET ORAL at 17:19

## 2020-09-05 RX ADMIN — CEFAZOLIN SODIUM 2000 MG: 2 SOLUTION INTRAVENOUS at 21:29

## 2020-09-05 RX ADMIN — CALCIUM ACETATE 667 MG: 667 CAPSULE ORAL at 12:30

## 2020-09-05 RX ADMIN — NYSTATIN: 100000 CREAM TOPICAL at 10:11

## 2020-09-05 RX ADMIN — IBUPROFEN 600 MG: 600 TABLET ORAL at 05:53

## 2020-09-05 RX ADMIN — MUPIROCIN 1 APPLICATION: 20 OINTMENT TOPICAL at 21:29

## 2020-09-05 RX ADMIN — Medication 250 MG: at 10:08

## 2020-09-05 RX ADMIN — OXYCODONE HYDROCHLORIDE 15 MG: 10 TABLET ORAL at 21:30

## 2020-09-05 RX ADMIN — ENOXAPARIN SODIUM 60 MG: 60 INJECTION SUBCUTANEOUS at 10:08

## 2020-09-05 RX ADMIN — IBUPROFEN 600 MG: 600 TABLET ORAL at 21:29

## 2020-09-05 RX ADMIN — ALPRAZOLAM 1 MG: 0.5 TABLET ORAL at 23:50

## 2020-09-05 RX ADMIN — MORPHINE SULFATE 2 MG: 2 INJECTION, SOLUTION INTRAMUSCULAR; INTRAVENOUS at 01:26

## 2020-09-05 RX ADMIN — OXYCODONE HYDROCHLORIDE 15 MG: 10 TABLET ORAL at 10:17

## 2020-09-05 RX ADMIN — CHLORHEXIDINE GLUCONATE 0.12% ORAL RINSE 15 ML: 1.2 LIQUID ORAL at 10:09

## 2020-09-05 RX ADMIN — OXYCODONE HYDROCHLORIDE 15 MG: 10 TABLET ORAL at 05:53

## 2020-09-05 RX ADMIN — CEFAZOLIN SODIUM 2000 MG: 2 SOLUTION INTRAVENOUS at 05:53

## 2020-09-05 RX ADMIN — OLANZAPINE 5 MG: 5 TABLET, FILM COATED ORAL at 21:29

## 2020-09-05 RX ADMIN — METHOCARBAMOL TABLETS 750 MG: 750 TABLET, COATED ORAL at 23:04

## 2020-09-05 RX ADMIN — CEFAZOLIN SODIUM 2000 MG: 2 SOLUTION INTRAVENOUS at 14:25

## 2020-09-05 RX ADMIN — Medication 250 MG: at 17:19

## 2020-09-05 RX ADMIN — CALCIUM ACETATE 667 MG: 667 CAPSULE ORAL at 17:19

## 2020-09-05 RX ADMIN — SENNOSIDES 8.6 MG: 8.6 TABLET ORAL at 17:19

## 2020-09-05 RX ADMIN — METHOCARBAMOL TABLETS 750 MG: 750 TABLET, COATED ORAL at 05:53

## 2020-09-05 RX ADMIN — MIRTAZAPINE 30 MG: 30 TABLET, FILM COATED ORAL at 21:29

## 2020-09-05 RX ADMIN — MUPIROCIN 1 APPLICATION: 20 OINTMENT TOPICAL at 10:10

## 2020-09-05 RX ADMIN — CALCIUM ACETATE 667 MG: 667 CAPSULE ORAL at 10:09

## 2020-09-05 RX ADMIN — GABAPENTIN 100 MG: 100 CAPSULE ORAL at 17:20

## 2020-09-05 RX ADMIN — SENNOSIDES 8.6 MG: 8.6 TABLET ORAL at 10:09

## 2020-09-05 RX ADMIN — PANTOPRAZOLE SODIUM 20 MG: 20 TABLET, DELAYED RELEASE ORAL at 05:53

## 2020-09-05 RX ADMIN — NYSTATIN: 100000 CREAM TOPICAL at 17:21

## 2020-09-05 RX ADMIN — MELATONIN 6 MG: at 21:29

## 2020-09-05 RX ADMIN — MORPHINE SULFATE 2 MG: 2 INJECTION, SOLUTION INTRAMUSCULAR; INTRAVENOUS at 12:30

## 2020-09-05 RX ADMIN — CHLORHEXIDINE GLUCONATE 0.12% ORAL RINSE 15 ML: 1.2 LIQUID ORAL at 21:28

## 2020-09-05 RX ADMIN — GABAPENTIN 100 MG: 100 CAPSULE ORAL at 10:10

## 2020-09-05 NOTE — PROGRESS NOTES
Progress Note - Cardiothoracic Surgery   Lyudmila Taylor 34 y o  female MRN: [id number]  Unit/Bed#: [facility] 526-01 Encounter: [id number]      24 Hour Events:  OMFS consultation completed  Scheduled for dental extractions tomorrow      Medications:   Scheduled Meds:  Current Facility-Administered Medications   Medication Dose Route Frequency Provider Last Rate    acetaminophen  650 mg Oral Q6H PRN Sunita Newtons, DO      ALPRAZolam  1 mg Oral Q4H PRN Too Rodriguez MD      alteplase  2 mg Intracatheter Q12H PRN Sunita Newtons, DO      calcium acetate  667 mg Oral TID With Meals Too Rodriguez MD      calcium carbonate  1,000 mg Oral Daily PRN Timmy Mehta MD      cefazolin  2,000 mg Intravenous Q8H Asha Roca PA-C 2,000 mg (09/05/20 0553)    chlorhexidine  15 mL [facility] Felipa [place], [place]      dicyclomine  10 mg Oral TID PRN Too Rodriguez MD      DULoxetine  60 mg Oral Daily Buelah Carlton, DO      enoxaparin  1 mg/kg Subcutaneous Q12H [address] Silverio Palma MD      gabapentin  100 mg Oral BID Buelah Carlton, DO      Followed by   Rupinder Eldridge ON 9/9/2020] gabapentin  100 mg Oral HS Buelah Carlton, DO      hydrocortisone   Topical 4x Daily PRN Sunita Carlton, DO      ibuprofen  600 mg Oral [facility] Buelah Carlton, DO      iohexol  50 mL Oral 90 min pre-procedure Hetul Axel,       lidocaine  2 patch Topical Daily Asha Roca PA-C      magnesium citrate  296 mL Oral Daily PRN Dillon Kim MD      melatonin  6 mg Oral HS PRN Asha Roca PA-C      methocarbamol  750 mg Oral Q6H [address] Silverio Palma MD      methylnaltrexone bromide  12 mg Subcutaneous Q48H PRN Silvreio Palma MD      mirtazapine  30 mg Oral HS Timmy Mehta MD      morphine injection  2 mg Intravenous Q4H PRN Too Rodriguez MD      mupirocin  1 application Nasal T17J [address] [facility] Felipa [place], [place]      nystatin   Topical BID Buelah Carlton, DO      OLANZapine  5 mg Oral HS Rafaela Veliz, MD      ondansetron  4 mg Intravenous Q6H PRN Corin Taylor MD      oxyCODONE  10 mg Oral Q4H PRN Brent Blood, CRMARKO      oxyCODONE  15 mg Oral Q4H PRN Brent Blood, CRNP      pantoprazole  20 mg Oral Early Morning Curly Brain, DO      saccharomyces boulardii  250 mg Oral BID Christina Hutton MD      senna  1 tablet Oral BID Christina Hutton MD       Continuous Infusions:     Results:   Results from last 7 days   Lab Units 09/05/20  0550 08/31/20  0541   WBC Thousand/uL 5 98 5 06   HEMOGLOBIN g/dL 9 5* 9 1*   HEMATOCRIT % 30 4* 28 6*   PLATELETS Thousands/uL 273 291     Results from last 7 days   Lab Units 09/05/20  0550 09/01/20  2142 08/31/20  0541   POTASSIUM mmol/L 3 7 4 0 3 9   CHLORIDE mmol/L 107 104 103   CO2 mmol/L 28 29 30   BUN mg/dL 17 14 11   CREATININE mg/dL 0 45* 0 54* 0 44*   CALCIUM mg/dL 8 5 8 7 8 7           Studies:     Echo: EF 55%, severe TR, medium sized fixed vegetation, 14 mm x 8 mm on anterior leaflet, mildly dilated RV     Carotid artery duplex:   < 50% right carotid stenosis  Vertebral artery flow is antegrade and There is no significant subclavian artery   < 50% left carotid stenosis  Vertebral artery flow is antegrade and There is no significant subclavian artery    Vitals:   Vitals:    09/03/20 2318 09/04/20 0744 09/04/20 2300 09/05/20 0737   BP: 106/53 114/56 110/60 108/62   BP Location:   Right arm Right arm   Pulse: 89 87 91 88   Resp: 18 22 16 16   Temp: 97 6 °F (36 4 °C) 97 7 °F (36 5 °C) 97 5 °F (36 4 °C) 98 1 °F (36 7 °C)   TempSrc:  Oral Oral Oral   SpO2: 98% 94% 99% 98%   Weight:       Height:           Physical Exam:    HEENT/NECK:  PERRLA  No jugular venous distention      Cardiac: Regular rate and rhythm and No murmurs/rubs/gallops  Pulmonary:  Breath sounds diminished in the bases bilaterally   Abdomen:  Non-tender and Non-distended  Extremities: Extremities warm/dry  Neuro: Alert and oriented X 3 and Sensation is grossly intact  Skin: Warm/Dry, without rashes or lesions  Assessment:    Tricuspid  Valve endocarditis; Ongoing TVR workup    Plan:  Patient agreeable to proceed with surgery;   OMFS consult completed; Plan for dental extractions tomorrow  Carotid U/S completed and acceptable      Blood type and cross match ordered for 9/8  Continue Mupirocin 2% nasal ointment q 12 hrs  Continue topical chlorhexidine bath and mouth rise  Preoperative oxygen, beta blocker, insulin, and antibiotics ordered tricuspid valve replacement scheduled for Thursday with SARAH Fernandez     Uriel Advent: Crystal Malik PA-C  DATE: September 5, 2020  TIME: 11:32 AM

## 2020-09-05 NOTE — ASSESSMENT & PLAN NOTE
· Patient noted to have tricuspid while vegetation on echocardiogram done in July  Patient had a transthoracic and also transesophageal echocardiogram done during the last hospital stay  · With septic emboli to lungs and posterior iliacus muscle  · Patient is transferred over here to be evaluated by CT surgery  · She was noncommittal to abstain from illegal drugs or unintended use of medications  · When she continues to abstain and completes iv abx treatment they would consider surgical correction - this will likely be as OP   · Continue IV cefazolin as above  · repeat echo - shows to severe TR, 14 x 8 mm mobile vegetation on tricuspid valve  No pericardial effusion, midly dilated RV (not mentioned before)  · Reconsulted CT surgery who recommend tricuspid valve replacement  Preoperative investigations being performed, OR next week    Patient understands that after this wall replacement if she ends up being back on drugs and gets it in the affected she will not get another surgery  · OMFS consulted for pre op clearance - to OR tomorrow for a tooth extraction

## 2020-09-05 NOTE — ASSESSMENT & PLAN NOTE
· Patient with history of drug abuse and likely the source of bacteremia  · Patient has a history of signing against medical advise  · May benefit from drug rehab eventually if she becomes agreeable to host services  She was not interested during my conversation  · During the hospital stay in July of this year patient was positive for [facility] ,cocaine and opiates    Although pt previosuly reported to Dr Natalie Ram that she had not used any drugs for several years now, but admitted to Dr Liss Marquez that she was actively abusing cocaine and heroin  · She swears to me after her boyfriend  that she doesn't want to use drugs & get high again

## 2020-09-05 NOTE — ASSESSMENT & PLAN NOTE
· Bilateral buttocks, papular itchy rash in a linear fashion  · Less likely fungal, allergic vs monitor for shingles  · Will trial topical miconazole, if doesn't work try topical benadryl

## 2020-09-05 NOTE — ASSESSMENT & PLAN NOTE
· Patient was complaining of severe back pain mainly in the lower part of the back and also in the sacral region  · Patient had MRI of the lumbar and thoracic spine during the recent hospital stay in [facility] which was unremarkable  · - continue Cymbalta 30 mg daily - increased to 60 mg daily on 9/1  · - continue robaxin to 750 mg q 6  · - continue oxy 10 mg and 15 mg  prn  · - continue iv morphine - decrease to 2q4 prn today  · -continue tylenol prn - refused ATC  · - continue lidocaine patch if pt does not refuse  · -continue gabapentin to 100 TID - decrease to BID tomorrow  · add ibuprofen RTC and ppi for GI ppx  · Iliacus muscle abscess is noted on CT scan, continue antibiotic treatment as above, no indication for drainage at this time  · Repeat CT scan shows improvement in septic emboli to iliacus muscle  · ketamine infusion discontinued  · Patient has been reluctant with interventions and procedures  She cites that she wants IV pain meds before intervention  Extensive discussion regarding acute pain service recommendations  Will proceed with following recommendations by acute pain service  · MRI completed 8/30 shows sacroiliitis which ID recs against surgical intervention    ID recs weekly CRP and ESR, which are trending down   · Hold off on thoracolumbar MRI as pain in right hip and prior MRI unremarkable  · Opioid, benzo, and Neurontin taper scanned into media  · Neurontin taper already ordered  · Will have to follow taper as pt cannot be d/c on controlled substances and pain will need to be controlled w/ Cymbalta, Robaxin, ibuprofen, and tylenol

## 2020-09-05 NOTE — ASSESSMENT & PLAN NOTE
Increase lovenox to 60mg Q12h  Due to severe pain consulted with vascular surgery for treatment options - appreciated  Picc continues to function well and post recent blood culture is negative  Elevation of LUE  PICC team attempted to move picc line to right arm given left arm DVT pain  Unsuccessful   IR consulted   IR exchanged PICC 8/26  Needs 3 months of AC - can d/c on NOAC if affordable    Okay to hold tomorrow morning dose of Lovenox for tooth extraction

## 2020-09-05 NOTE — PROGRESS NOTES
Patient refusing to allow staff to obtain vital signs  Informed patient of the importance of vital signs, patient stated she "doesnt't feel like it"

## 2020-09-05 NOTE — ASSESSMENT & PLAN NOTE
· Recurrent MSSA bacteremia  · Initially admitted to [facility] from 07/15-blood culture were positive for MSSA bacteremia, but patient signed AMA on 07/22  Her repeat blood culture done on 7/21 was negative after 5 days  · [facility] on 07/30-blood culture came back positive for MSSA and she left again is medical advice on 08/04  Her blood cultures came back positive on 07/30, but the bacteremia cleared as of 8 /2  Patient left AMA on 08/04  · Readmitted to [address] [place] [address] on 08/10-blood culture came back positive for Staph aureus  · 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

## 2020-09-05 NOTE — PROGRESS NOTES
Progress Note - Brit Gaitan 1990, 34 y o  female MRN: [id number]    Unit/Bed#: [facility] 526-01 Encounter: [id number]    Primary Care Provider: Shruthi Mccray PA-C   Date and time admitted to hospital: 2020  7:39 PM        Rash  Assessment & Plan  · Bilateral buttocks, papular itchy rash in a linear fashion  · Less likely fungal, allergic vs monitor for shingles  · Will trial topical miconazole, if doesn't work try topical benadryl    Transaminitis  Assessment & Plan  Check acute hep panel - pt reports hx of Hep C  Positive for hep C antibodies  AST and ALP continue to be up and down    DVT of axillary vein, acute left [facility])  Assessment & Plan  Increase lovenox to 60mg Q12h  Due to severe pain consulted with vascular surgery for treatment options - appreciated  Picc continues to function well and post recent blood culture is negative  Elevation of LUE  PICC team attempted to move picc line to right arm given left arm DVT pain  Unsuccessful  IR consulted   IR exchanged PICC   Needs 3 months of AC - can d/c on NOAC if affordable    Okay to hold tomorrow morning dose of Lovenox for tooth extraction    Intravenous drug abuse [facility])  Assessment & Plan  · Patient with history of drug abuse and likely the source of bacteremia  · Patient has a history of signing against medical advise  · May benefit from drug rehab eventually if she becomes agreeable to host services  She was not interested during my conversation  · During the hospital stay in July of this year patient was positive for [facility] ,cocaine and opiates    Although pt previosuly reported to Dr Nicolas Blackman that she had not used any drugs for several years now, but admitted to Dr Rupa Hearn that she was actively abusing cocaine and heroin  · She swears to me after her boyfriend  that she doesn't want to use drugs & get high again    Right hip pain  Assessment & Plan  · Patient was complaining of severe back pain mainly in the lower part of the back and also in the sacral region  · Patient had MRI of the lumbar and thoracic spine during the recent hospital stay in [facility] which was unremarkable  · - continue Cymbalta 30 mg daily - increased to 60 mg daily on 9/1  · - continue robaxin to 750 mg q 6  · - continue oxy 10 mg and 15 mg  prn  · - continue iv morphine - decrease to 2q4 prn today  · -continue tylenol prn - refused ATC  · - continue lidocaine patch if pt does not refuse  · -continue gabapentin to 100 TID - decrease to BID tomorrow  · add ibuprofen RTC and ppi for GI ppx  · Iliacus muscle abscess is noted on CT scan, continue antibiotic treatment as above, no indication for drainage at this time  · Repeat CT scan shows improvement in septic emboli to iliacus muscle  · ketamine infusion discontinued  · Patient has been reluctant with interventions and procedures  She cites that she wants IV pain meds before intervention  Extensive discussion regarding acute pain service recommendations  Will proceed with following recommendations by acute pain service  · MRI completed 8/30 shows sacroiliitis which ID recs against surgical intervention  ID recs weekly CRP and ESR, which are trending down   · Hold off on thoracolumbar MRI as pain in right hip and prior MRI unremarkable  · Opioid, benzo, and Neurontin taper scanned into media  · Neurontin taper already ordered  · Will have to follow taper as pt cannot be d/c on controlled substances and pain will need to be controlled w/ Cymbalta, Robaxin, ibuprofen, and tylenol    Bipolar 1 disorder ([facility] [address] )  Assessment & Plan  · Patient 8/27 agreed to psych eval which is apprecaited  · Psych added Zyprexa  · C/w Cymbalta and Remeron  · So far has been on p r n  IV Ativan  She states that it is not working for her    Getting panic attacks after passing away of her boyfriend about 2 days ago  · She discuss this was with Pain Service recommends changing her from IV Ativan to oral Xanax 1 mg q 4 hours p r n   Ordered that    Septic embolism [facility])  Assessment & Plan  · Patient noted to have abnormalities seen in the CT of the chest abdomen and pelvis concerning for septic emboli  · There is pleural effusion on the CT scan of the chest and also on repeat chest x-ray  · Patient is rather asymptomatic from pulmonary standpoint  · Continue with the antibiotics  · Thoracic surgery have evaluated the pt and she is not short of breath   · 8/15-imaging right hip shows no osseous involvement; concern for proximity of abscess status SI joint, but no SI joint involvement at this time  · Management as above under hip pain  · c/o pleuritic CP  CXR shows no acute finding  Suspect pain could be related to septic emboli  · Chest CT performed preoperatively today shows resolution of right pleural effusion and improvement of several of the previous opacities with some new opacities which probably are post infectious/inflammatory resolving changes      Acute bacterial endocarditis  Assessment & Plan  · Patient noted to have tricuspid while vegetation on echocardiogram done in July  Patient had a transthoracic and also transesophageal echocardiogram done during the last hospital stay  · With septic emboli to lungs and posterior iliacus muscle  · Patient is transferred over here to be evaluated by CT surgery  · She was noncommittal to abstain from illegal drugs or unintended use of medications  · When she continues to abstain and completes iv abx treatment they would consider surgical correction - this will likely be as OP   · Continue IV cefazolin as above  · repeat echo - shows to severe TR, 14 x 8 mm mobile vegetation on tricuspid valve  No pericardial effusion, midly dilated RV (not mentioned before)  · Reconsulted CT surgery who recommend tricuspid valve replacement  Preoperative investigations being performed, OR next week    Patient understands that after this wall replacement if she ends up being back on drugs and gets it in the affected she will not get another surgery  · OMFS consulted for pre op clearance - to OR tomorrow for a tooth extraction    * Bacteremia due to Staphylococcus aureus  Assessment & Plan  · Recurrent MSSA bacteremia  · Initially admitted to [facility] from 07/15-blood culture were positive for MSSA bacteremia, but patient signed AMA on 07/22  Her repeat blood culture done on 7/21 was negative after 5 days  · [facility] on 07/30-blood culture came back positive for MSSA and she left again is medical advice on 08/04  Her blood cultures came back positive on 07/30, but the bacteremia cleared as of 8 /2  Patient left AMA on 08/04  · Readmitted to [address] [place] [address] on 08/10-blood culture came back positive for Staph aureus  · 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 during the last hospital stay from 7/15-7/22  · Plan is to continue IV cefazolin for a 6 week course per ID recommendations from 8/15 when B Cx became neg till 9/26  · 8/26 removed PICC and PICC cx neg  · B Cx from 8/26 neg      [facility] Progress Note  Patient: Sony Mccann 34 y o  female   MRN: [id number]  PCP: Virginia Mayer PA-C  Unit/Bed#: [facility] 526-01 Encounter: [id number]  Date Of Visit: 09/05/20    Assessment:    Principal Problem:    Bacteremia due to Staphylococcus aureus  Active Problems:    Acute bacterial endocarditis    Septic embolism (HCC)    Bipolar 1 disorder ([facility] [address] )    Right hip pain    Intravenous drug abuse ([facility] [address] )    DVT of axillary vein, acute left (HCC)    Transaminitis    Rash      Plan:         VTE Pharmacologic Prophylaxis:   Pharmacologic: Enoxaparin (Lovenox)  Mechanical VTE Prophylaxis in Place: Yes    Patient Centered Rounds: I have performed bedside rounds with nursing staff today      Discussions with Specialists or Other Care Team Provider:     Education and Discussions with Family / Patient: patient    Time Spent for Care: 30 minutes  More than 50% of total time spent on counseling and coordination of care as described above  Current Length of Stay: 24 day(s)    Current Patient Status: Inpatient   Certification Statement: The patient will continue to require additional inpatient hospital stay due to not ready    Discharge Plan / Estimated Discharge Date:     Code Status: Level 1 - Full Code      Subjective:   Feels ok, having right hip pain, c/o rash on buttocks    Objective:     Vitals:   Temp (24hrs), Av 8 °F (36 6 °C), Min:97 5 °F (36 4 °C), Max:98 1 °F (36 7 °C)    Temp:  [97 5 °F (36 4 °C)-98 1 °F (36 7 °C)] 98 1 °F (36 7 °C)  HR:  [88-91] 88  Resp:  [16] 16  BP: ()/(56-62) 99/56  SpO2:  [98 %-99 %] 98 %  Body mass index is 23 3 kg/m²  Input and Output Summary (last 24 hours): Intake/Output Summary (Last 24 hours) at 2020 1720  Last data filed at 2020 1304  Gross per 24 hour   Intake 472 ml   Output    Net 472 ml       Physical Exam:     Physical Exam  Vitals signs reviewed  HENT:      Head: Normocephalic and atraumatic  Cardiovascular:      Rate and Rhythm: Normal rate and regular rhythm  Heart sounds: No murmur  No gallop  Pulmonary:      Effort: Pulmonary effort is normal  No respiratory distress  Breath sounds: Normal breath sounds  No wheezing  Abdominal:      General: There is no distension  Palpations: Abdomen is soft  Tenderness: There is no abdominal tenderness  Skin:     Findings: Rash present  Neurological:      Mental Status: She is alert           Additional Data:     Labs:    Results from last 7 days   Lab Units 20  0550   WBC Thousand/uL 5 98   HEMOGLOBIN g/dL 9 5*   HEMATOCRIT % 30 4*   PLATELETS Thousands/uL 273   NEUTROS PCT % 31*   LYMPHS PCT % 46*   MONOS PCT % 9   EOS PCT % 13*     Results from last 7 days   Lab Units 20  0550 20  2142   POTASSIUM mmol/L 3 7 4 0   CHLORIDE mmol/L 107 104   CO2 mmol/L 28 29   BUN mg/dL 17 14 CREATININE mg/dL 0 45* 0 54*   CALCIUM mg/dL 8 5 8 7   ALK PHOS U/L  --  284*   ALT U/L  --  72   AST U/L  --  147*           * I Have Reviewed All Lab Data Listed Above  * Additional Pertinent Lab Tests Reviewed:  All Labs Within Last 24 Hours Reviewed    Imaging:    Imaging Reports Reviewed Today Include:   Imaging Personally Reviewed by Myself Includes:      Recent Cultures (last 7 days):           Last 24 Hours Medication List:   Current Facility-Administered Medications   Medication Dose Route Frequency Provider Last Rate    acetaminophen  650 mg Oral Q6H PRN Dell Pedraza, DO      ALPRAZolam  1 mg Oral Q4H PRN Deepak Severino MD      alteplase  2 mg Intracatheter Q12H PRN Dell Pedraza DO      calcium acetate  667 mg Oral TID With Meals Deepak Severino MD      calcium carbonate  1,000 mg Oral Daily PRN Dimas Polk MD      cefazolin  2,000 mg Intravenous Q8H Asha Roca PA-C 2,000 mg (09/05/20 1425)    chlorhexidine  15 mL [place], [place]      dicyclomine  10 mg Oral TID PRN Deepak Severino MD      DULoxetine  60 mg Oral Daily Dell Pedraza DO      enoxaparin  1 mg/kg Subcutaneous Q12H [address] Ky Hdz MD      gabapentin  100 mg Oral BID Dell Pedraza DO      Followed by   India Martinez ON 9/9/2020] gabapentin  100 mg Oral HS Dell Pedraza,       hydrocortisone   Topical 4x Daily PRN Dell Pedraza DO      ibuprofen  600 mg Oral Q8H [address] Buddy Handy DO      iohexol  50 mL Oral 90 min pre-procedure Mychal Reyna DO      lidocaine  2 patch Topical Daily Asha Roca PA-C      magnesium citrate  296 mL Oral Daily PRN Loretta White MD      melatonin  6 mg Oral HS PRN Asha Roca PA-C      methocarbamol  750 mg Oral Q6H [address] Ky Hdz MD      methylnaltrexone bromide  12 mg Subcutaneous Q48H PRN Ky Hdz MD      miconazole   Topical BID Deepak Severino MD      mirtazapine  30 mg Oral HS Dimas Polk MD      morphine injection  2 mg Intravenous Q4H PRN Queen Sánchez MD      mupirocin  1 application Nasal H54X [address] Mee Corky [place], [place]      nystatin   Topical BID [place] Mixer, DO      OLANZapine  5 mg Oral HS Adolfo Nath MD      ondansetron  4 mg Intravenous Q6H PRN Viet Patino MD      oxyCODONE  10 mg Oral Q4H PRN [facility], BOO      oxyCODONE  15 mg Oral Q4H PRN [facility], BOO      pantoprazole  20 mg Oral Early Morning [employer]aco Mixer, DO      saccharomyces boulardii  250 mg Oral BID Wil Young MD      senna  1 tablet Oral BID Wil Young MD          Today, Patient Was Seen By: Queen áSnchez MD    ** Please Note: This note has been constructed using a voice recognition system   **

## 2020-09-06 ENCOUNTER — ANESTHESIA EVENT (INPATIENT)
Dept: PERIOP | Facility: HOSPITAL | Age: 30
DRG: 163 | End: 2020-09-06
Payer: COMMERCIAL

## 2020-09-06 PROBLEM — D64.9 ANEMIA: Status: ACTIVE | Noted: 2020-09-06

## 2020-09-06 PROCEDURE — 99232 SBSQ HOSP IP/OBS MODERATE 35: CPT | Performed by: HOSPITALIST

## 2020-09-06 RX ADMIN — CHLORHEXIDINE GLUCONATE 0.12% ORAL RINSE 15 ML: 1.2 LIQUID ORAL at 08:40

## 2020-09-06 RX ADMIN — IBUPROFEN 600 MG: 600 TABLET ORAL at 13:18

## 2020-09-06 RX ADMIN — CALCIUM ACETATE 667 MG: 667 CAPSULE ORAL at 15:51

## 2020-09-06 RX ADMIN — MICONAZOLE NITRATE: 2 CREAM TOPICAL at 17:05

## 2020-09-06 RX ADMIN — METHOCARBAMOL TABLETS 750 MG: 750 TABLET, COATED ORAL at 23:03

## 2020-09-06 RX ADMIN — METHOCARBAMOL TABLETS 750 MG: 750 TABLET, COATED ORAL at 05:36

## 2020-09-06 RX ADMIN — METHOCARBAMOL TABLETS 750 MG: 750 TABLET, COATED ORAL at 11:26

## 2020-09-06 RX ADMIN — ENOXAPARIN SODIUM 60 MG: 60 INJECTION SUBCUTANEOUS at 20:00

## 2020-09-06 RX ADMIN — GABAPENTIN 100 MG: 100 CAPSULE ORAL at 17:05

## 2020-09-06 RX ADMIN — NYSTATIN: 100000 CREAM TOPICAL at 08:41

## 2020-09-06 RX ADMIN — SENNOSIDES 8.6 MG: 8.6 TABLET ORAL at 08:40

## 2020-09-06 RX ADMIN — MORPHINE SULFATE 2 MG: 2 INJECTION, SOLUTION INTRAMUSCULAR; INTRAVENOUS at 12:38

## 2020-09-06 RX ADMIN — IBUPROFEN 600 MG: 600 TABLET ORAL at 05:36

## 2020-09-06 RX ADMIN — Medication 250 MG: at 17:05

## 2020-09-06 RX ADMIN — SENNOSIDES 8.6 MG: 8.6 TABLET ORAL at 17:05

## 2020-09-06 RX ADMIN — MORPHINE SULFATE 2 MG: 2 INJECTION, SOLUTION INTRAMUSCULAR; INTRAVENOUS at 21:17

## 2020-09-06 RX ADMIN — DULOXETINE HYDROCHLORIDE 60 MG: 60 CAPSULE, DELAYED RELEASE ORAL at 08:40

## 2020-09-06 RX ADMIN — OLANZAPINE 5 MG: 5 TABLET, FILM COATED ORAL at 21:15

## 2020-09-06 RX ADMIN — MELATONIN 6 MG: at 21:15

## 2020-09-06 RX ADMIN — MUPIROCIN 1 APPLICATION: 20 OINTMENT TOPICAL at 08:40

## 2020-09-06 RX ADMIN — CEFAZOLIN SODIUM 2000 MG: 2 SOLUTION INTRAVENOUS at 21:17

## 2020-09-06 RX ADMIN — CALCIUM ACETATE 667 MG: 667 CAPSULE ORAL at 11:26

## 2020-09-06 RX ADMIN — NYSTATIN: 100000 CREAM TOPICAL at 17:05

## 2020-09-06 RX ADMIN — MORPHINE SULFATE 2 MG: 2 INJECTION, SOLUTION INTRAMUSCULAR; INTRAVENOUS at 07:39

## 2020-09-06 RX ADMIN — OXYCODONE HYDROCHLORIDE 15 MG: 10 TABLET ORAL at 05:41

## 2020-09-06 RX ADMIN — PANTOPRAZOLE SODIUM 20 MG: 20 TABLET, DELAYED RELEASE ORAL at 05:36

## 2020-09-06 RX ADMIN — MORPHINE SULFATE 2 MG: 2 INJECTION, SOLUTION INTRAMUSCULAR; INTRAVENOUS at 17:06

## 2020-09-06 RX ADMIN — GABAPENTIN 100 MG: 100 CAPSULE ORAL at 08:40

## 2020-09-06 RX ADMIN — OXYCODONE HYDROCHLORIDE 15 MG: 10 TABLET ORAL at 11:26

## 2020-09-06 RX ADMIN — Medication 250 MG: at 08:40

## 2020-09-06 RX ADMIN — CEFAZOLIN SODIUM 2000 MG: 2 SOLUTION INTRAVENOUS at 05:36

## 2020-09-06 RX ADMIN — CHLORHEXIDINE GLUCONATE 0.12% ORAL RINSE 15 ML: 1.2 LIQUID ORAL at 20:00

## 2020-09-06 RX ADMIN — OXYCODONE HYDROCHLORIDE 15 MG: 10 TABLET ORAL at 15:51

## 2020-09-06 RX ADMIN — IBUPROFEN 600 MG: 600 TABLET ORAL at 21:15

## 2020-09-06 RX ADMIN — MIRTAZAPINE 30 MG: 30 TABLET, FILM COATED ORAL at 21:15

## 2020-09-06 RX ADMIN — METHOCARBAMOL TABLETS 750 MG: 750 TABLET, COATED ORAL at 17:05

## 2020-09-06 RX ADMIN — MICONAZOLE NITRATE: 2 CREAM TOPICAL at 08:40

## 2020-09-06 RX ADMIN — CEFAZOLIN SODIUM 2000 MG: 2 SOLUTION INTRAVENOUS at 13:19

## 2020-09-06 RX ADMIN — MUPIROCIN 1 APPLICATION: 20 OINTMENT TOPICAL at 20:00

## 2020-09-06 RX ADMIN — ALPRAZOLAM 1 MG: 0.5 TABLET ORAL at 23:03

## 2020-09-06 RX ADMIN — OXYCODONE HYDROCHLORIDE 15 MG: 10 TABLET ORAL at 20:00

## 2020-09-06 NOTE — ASSESSMENT & PLAN NOTE
· Patient noted to have tricuspid while vegetation on echocardiogram done in July  Patient had a transthoracic and also transesophageal echocardiogram done during the last hospital stay  · With septic emboli to lungs and posterior iliacus muscle  · Patient is transferred over here to be evaluated by CT surgery  · She was noncommittal to abstain from illegal drugs or unintended use of medications  · When she continues to abstain and completes iv abx treatment they would consider surgical correction - this will likely be as OP   · Continue IV cefazolin as above  · repeat echo - shows to severe TR, 14 x 8 mm mobile vegetation on tricuspid valve  No pericardial effusion, midly dilated RV (not mentioned before)  · Reconsulted CT surgery who recommend tricuspid valve replacement  Preoperative investigations being performed, OR next week  Patient understands that after this wall replacement if she ends up being back on drugs and gets it in the affected she will not get another surgery  · OMFS consulted for pre op clearance - to OR tomorrow for a tooth extraction   She ate a hoagie today pre-op hence cancelled

## 2020-09-06 NOTE — PROGRESS NOTES
Oral and Maxillofacial Surgery Progress Note        34 y o  female evaluated for dental clearance prior to valve surgery and planned for extraction of unrestorable tooth #32  Patient was brought to pre-op holding area this morning, as planned, and admitted eating a sandwich this morning even though she was made NPO at midnight  Discussed with patient possibility of doing the extraction under local anesthesia only in the OR but patient refused  We explained to the patient that case will then need to be rescheduled  Patient made aware that we plan to do the extraction tomorrow Monday 9/7/20 at [address] [place] [address]  She is aware that she needs to be NPO at midnight tonight        Plan:   -Plan for OR on Monday 9/7 at [address] [place] [address] for extraction under MAC sedation     - NPO/IVF Sunday at midnight   - Encourage good oral hygiene  - DVT ppx: SCD, OOB; please hold pharmacologic AC for the OR if possible, can start post-op      D/w OMFS attg on call Dr Brenda Falcon

## 2020-09-06 NOTE — ASSESSMENT & PLAN NOTE
· Patient with history of drug abuse and likely the source of bacteremia  · Patient has a history of signing against medical advise  · May benefit from drug rehab eventually if she becomes agreeable to host services  She was not interested during my conversation  · During the hospital stay in July of this year patient was positive for [facility] ,cocaine and opiates    Although pt previosuly reported to Dr Kristen Martinez that she had not used any drugs for several years now, but admitted to Dr Justin Andrade that she was actively abusing cocaine and heroin  · She swears to me after her boyfriend  that she doesn't want to use drugs & get high again

## 2020-09-06 NOTE — ASSESSMENT & PLAN NOTE
· Recurrent MSSA bacteremia  · Initially admitted to [facility] from 07/15-blood culture were positive for MSSA bacteremia, but patient signed AMA on 07/22  Her repeat blood culture done on 7/21 was negative after 5 days  · [facility] on 07/30-blood culture came back positive for MSSA and she left again is medical advice on 08/04  Her blood cultures came back positive on 07/30, but the bacteremia cleared as of 8 /2  Patient left AMA on 08/04  · Readmitted to [address] [place] [address] on 08/10-blood culture came back positive for Staph aureus  · 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

## 2020-09-06 NOTE — PROGRESS NOTES
Progress Note - Sherine Mas 1990, 34 y o  female MRN: [id number]    Unit/Bed#: [facility] 526-01 Encounter: [id number]    Primary Care Provider: Jess Mccloud PA-C   Date and time admitted to hospital: 2020  7:39 PM        Rash  Assessment & Plan  · Bilateral buttocks, papular itchy rash in a linear fashion  · Less likely fungal, allergic vs monitor for shingles  · Will trial topical miconazole, if doesn't work try topical benadryl    Transaminitis  Assessment & Plan  Check acute hep panel - pt reports hx of Hep C  Positive for hep C antibodies  AST and ALP continue to be up and down    DVT of axillary vein, acute left [facility])  Assessment & Plan  Increase lovenox to 60mg Q12h  Due to severe pain consulted with vascular surgery for treatment options - appreciated  Picc continues to function well and post recent blood culture is negative  Elevation of LUE  PICC team attempted to move picc line to right arm given left arm DVT pain  Unsuccessful  IR consulted   IR exchanged PICC   Needs 3 months of AC - can d/c on NOAC if affordable    Okay to hold tomorrow morning dose of Lovenox for tooth extraction    Intravenous drug abuse [facility])  Assessment & Plan  · Patient with history of drug abuse and likely the source of bacteremia  · Patient has a history of signing against medical advise  · May benefit from drug rehab eventually if she becomes agreeable to host services  She was not interested during my conversation  · During the hospital stay in July of this year patient was positive for [facility] ,cocaine and opiates    Although pt previosuly reported to Dr Dmitriy Hernandez that she had not used any drugs for several years now, but admitted to Dr Alicia Dunne that she was actively abusing cocaine and heroin  · She swears to me after her boyfriend  that she doesn't want to use drugs & get high again    Right hip pain  Assessment & Plan  · Patient was complaining of severe back pain mainly in the lower part of the back and also in the sacral region  · Patient had MRI of the lumbar and thoracic spine during the recent hospital stay in [facility] which was unremarkable  · - continue Cymbalta 30 mg daily - increased to 60 mg daily on 9/1  · - continue robaxin to 750 mg q 6  · - continue oxy 10 mg and 15 mg  prn  · - continue iv morphine - decrease to 2q4 prn today  · -continue tylenol prn - refused ATC  · - continue lidocaine patch if pt does not refuse  · -continue gabapentin to 100 TID - decrease to BID tomorrow  · add ibuprofen RTC and ppi for GI ppx  · Iliacus muscle abscess is noted on CT scan, continue antibiotic treatment as above, no indication for drainage at this time  · Repeat CT scan shows improvement in septic emboli to iliacus muscle  · ketamine infusion discontinued  · Patient has been reluctant with interventions and procedures  She cites that she wants IV pain meds before intervention  Extensive discussion regarding acute pain service recommendations  Will proceed with following recommendations by acute pain service  · MRI completed 8/30 shows sacroiliitis which ID recs against surgical intervention  ID recs weekly CRP and ESR, which are trending down   · Hold off on thoracolumbar MRI as pain in right hip and prior MRI unremarkable  · Opioid, benzo, and Neurontin taper scanned into media  · Neurontin taper already ordered  · Will have to follow taper as pt cannot be d/c on controlled substances and pain will need to be controlled w/ Cymbalta, Robaxin, ibuprofen, and tylenol    Bipolar 1 disorder ([facility] [address] )  Assessment & Plan  · Patient 8/27 agreed to psych eval which is apprecaited  · Psych added Zyprexa  · C/w Cymbalta and Remeron  · So far has been on p r n  IV Ativan  She states that it is not working for her    Getting panic attacks after passing away of her boyfriend about 2 days ago  · She discuss this was with Pain Service recommends changing her from IV Ativan to oral Xanax 1 mg q 4 hours p r n   Ordered that    Septic embolism [facility])  Assessment & Plan  · Patient noted to have abnormalities seen in the CT of the chest abdomen and pelvis concerning for septic emboli  · There is pleural effusion on the CT scan of the chest and also on repeat chest x-ray  · Patient is rather asymptomatic from pulmonary standpoint  · Continue with the antibiotics  · Thoracic surgery have evaluated the pt and she is not short of breath   · 8/15-imaging right hip shows no osseous involvement; concern for proximity of abscess status SI joint, but no SI joint involvement at this time  · Management as above under hip pain  · c/o pleuritic CP  CXR shows no acute finding  Suspect pain could be related to septic emboli  · Chest CT performed preoperatively today shows resolution of right pleural effusion and improvement of several of the previous opacities with some new opacities which probably are post infectious/inflammatory resolving changes      Acute bacterial endocarditis  Assessment & Plan  · Patient noted to have tricuspid while vegetation on echocardiogram done in July  Patient had a transthoracic and also transesophageal echocardiogram done during the last hospital stay  · With septic emboli to lungs and posterior iliacus muscle  · Patient is transferred over here to be evaluated by CT surgery  · She was noncommittal to abstain from illegal drugs or unintended use of medications  · When she continues to abstain and completes iv abx treatment they would consider surgical correction - this will likely be as OP   · Continue IV cefazolin as above  · repeat echo - shows to severe TR, 14 x 8 mm mobile vegetation on tricuspid valve  No pericardial effusion, midly dilated RV (not mentioned before)  · Reconsulted CT surgery who recommend tricuspid valve replacement  Preoperative investigations being performed, OR next week    Patient understands that after this wall replacement if she ends up being back on drugs and gets it in the affected she will not get another surgery  · OMFS consulted for pre op clearance - to OR tomorrow for a tooth extraction  She ate a hoagie today pre-op hence cancelled    * Bacteremia due to Staphylococcus aureus  Assessment & Plan  · Recurrent MSSA bacteremia  · Initially admitted to [facility] from 07/15-blood culture were positive for MSSA bacteremia, but patient signed AMA on 07/22  Her repeat blood culture done on 7/21 was negative after 5 days  · [facility] on 07/30-blood culture came back positive for MSSA and she left again is medical advice on 08/04  Her blood cultures came back positive on 07/30, but the bacteremia cleared as of 8 /2  Patient left AMA on 08/04  · Readmitted to [facility] on 08/10-blood culture came back positive for Staph aureus  · 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 Encounter: [id number]  Date Of Visit: 09/06/20    Assessment:    Principal Problem:    Bacteremia due to Staphylococcus aureus  Active Problems:    Acute bacterial endocarditis    Septic embolism (HCC)    Bipolar 1 disorder ([facility] [address] )    Right hip pain    [facility]    DVT of axillary vein, acute left (HCC)    Transaminitis    Rash    Anemia      Plan:       VTE Pharmacologic Prophylaxis:   Pharmacologic: Enoxaparin (Lovenox)  Mechanical VTE Prophylaxis in Place: Yes    Patient Centered Rounds: I have performed bedside rounds with nursing staff today      Discussions with Specialists or Other Care Team Provider:     Education and Discussions with Family / Patient: patient    Time Spent for Care: 30 minutes  More than 50% of total time spent on counseling and coordination of care as described above  Current Length of Stay: 25 day(s)    Current Patient Status: Inpatient   Certification Statement: The patient will continue to require additional inpatient hospital stay due to not ready    Discharge Plan / Estimated Discharge Date:     Code Status: Level 1 - Full Code      Subjective:   She feels bad about eating the sandwich, states she thought it would just be mild sedation & not general anesthesia    Objective:     Vitals:   Temp (24hrs), Av °F (36 7 °C), Min:97 7 °F (36 5 °C), Max:98 3 °F (36 8 °C)    Temp:  [97 7 °F (36 5 °C)-98 3 °F (36 8 °C)] 97 7 °F (36 5 °C)  HR:  [] 79  Resp:  [16-20] 20  BP: (105-133)/(57-88) 105/57  SpO2:  [96 %-99 %] 97 %  Body mass index is 23 3 kg/m²  Input and Output Summary (last 24 hours): Intake/Output Summary (Last 24 hours) at 2020 1634  Last data filed at 2020 1601  Gross per 24 hour   Intake 1076 ml   Output 400 ml   Net 676 ml       Physical Exam:     Physical Exam  Vitals signs reviewed  HENT:      Head: Normocephalic and atraumatic  Cardiovascular:      Rate and Rhythm: Normal rate and regular rhythm  Heart sounds: Normal heart sounds  No murmur  No gallop  Pulmonary:      Effort: Pulmonary effort is normal  No respiratory distress  Breath sounds: Normal breath sounds  No wheezing  Abdominal:      General: There is no distension  Palpations: Abdomen is soft  Tenderness: There is no abdominal tenderness  Neurological:      Mental Status: She is alert and oriented to person, place, and time           Additional Data:     Labs:    Results from last 7 days   Lab Units 20  0550   WBC Thousand/uL 5 98   HEMOGLOBIN g/dL 9 5*   HEMATOCRIT % 30 4*   PLATELETS Thousands/uL 273   NEUTROS PCT % 31*   LYMPHS PCT % 46*   MONOS PCT % 9   EOS PCT % 13* Results from last 7 days   Lab Units 09/05/20  0550 09/01/20  2142   POTASSIUM mmol/L 3 7 4 0   CHLORIDE mmol/L 107 104   CO2 mmol/L 28 29   BUN mg/dL 17 14   CREATININE mg/dL 0 45* 0 54*   CALCIUM mg/dL 8 5 8 7   ALK PHOS U/L  --  284*   ALT U/L  --  72   AST U/L  --  147*           * I Have Reviewed All Lab Data Listed Above    * Additional Pertinent Lab Tests Reviewed: No New Labs Available For Today    Imaging:    Imaging Reports Reviewed Today Include:   Imaging Personally Reviewed by Myself Includes:      Recent Cultures (last 7 days):           Last 24 Hours Medication List:   Current Facility-Administered Medications   Medication Dose Route Frequency Provider Last Rate    acetaminophen  650 mg Oral Q6H PRN Gin Ledesma MD      ALPRAZolam  1 mg Oral Q4H PRN Ledy Atkinson MD      alteplase  2 mg Intracatheter Q12H PRN Ledy Atkinson MD      calcium acetate  667 mg Oral TID With Meals Ledy Atkinson MD      calcium carbonate  1,000 mg Oral Daily PRN Ledy Atkinson MD      cefazolin  2,000 mg Intravenous Fabio Saravia MD Stopped (09/06/20 1348)    chlorhexidine  15 mL Swish & Spit Q12H [facility] Ledy Atkinson MD      dicyclomine  10 mg Oral TID PRN Ledy Atkinson MD      DULoxetine  60 mg Oral Daily Gin Ledesma MD      enoxaparin  1 mg/kg Subcutaneous Q12H [facility] Gin Ledesma MD      gabapentin  100 mg Oral BID Ledy Atkinson MD      Followed by   Brooks Chapman ON 9/9/2020] gabapentin  100 mg Oral HS Ledy Atkinson MD      hydrocortisone   Topical 4x Daily PRN Ledy Atkinson MD      ibuprofen  600 mg Oral Q8H [facility] WILDA Ledesma MD      iohexol  50 mL Oral 90 min pre-procedure Ledy Atkinson MD      lidocaine  2 patch Topical Daily Gin Ledesma MD      magnesium citrate  296 mL Oral Daily PRN Gin Ledesma MD      melatonin  6 mg Oral HS PRN Ledy Atkinson MD      methocarbamol  750 mg Oral Q6H [facility] Gin Ledesma, MD      methylnaltrexone bromide  12 mg Subcutaneous Q48H PRN Jaimie Cleaning MD      miconazole   Topical BID Jaimie Cleaning MD      mirtazapine  30 mg Oral HS Gin Ledesma MD      morphine injection  2 mg Intravenous Q4H PRN Jaimie Cleaning MD      mupirocin  1 application Nasal O14V [facility] Jaimie Cleaning MD      nystatin   Topical BID Gin Ledesma MD      OLANZapine  5 mg Oral HS Gin Ledesma MD      ondansetron  4 mg Intravenous Q6H PRN Jaimie Cleaning MD      oxyCODONE  10 mg Oral Q4H PRN Jaimie Cleaning MD      oxyCODONE  15 mg Oral Q4H PRN Jaimie Cleaning MD      pantoprazole  20 mg Oral Early Morning Gin Ledesma MD      saccharomyces boulardii  250 mg Oral BID Jaimie Cleaning MD      senna  1 tablet Oral BID Jaimie Cleaning MD          Today, Patient Was Seen By: Jaimie Cleaning MD    ** Please Note: This note has been constructed using a voice recognition system   **

## 2020-09-06 NOTE — ASSESSMENT & PLAN NOTE
· Patient was complaining of severe back pain mainly in the lower part of the back and also in the sacral region  · Patient had MRI of the lumbar and thoracic spine during the recent hospital stay in [facility] which was unremarkable  · - continue Cymbalta 30 mg daily - increased to 60 mg daily on 9/1  · - continue robaxin to 750 mg q 6  · - continue oxy 10 mg and 15 mg  prn  · - continue iv morphine - decrease to 2q4 prn today  · -continue tylenol prn - refused ATC  · - continue lidocaine patch if pt does not refuse  · -continue gabapentin to 100 TID - decrease to BID tomorrow  · add ibuprofen RTC and ppi for GI ppx  · Iliacus muscle abscess is noted on CT scan, continue antibiotic treatment as above, no indication for drainage at this time  · Repeat CT scan shows improvement in septic emboli to iliacus muscle  · ketamine infusion discontinued  · Patient has been reluctant with interventions and procedures  She cites that she wants IV pain meds before intervention  Extensive discussion regarding acute pain service recommendations  Will proceed with following recommendations by acute pain service  · MRI completed 8/30 shows sacroiliitis which ID recs against surgical intervention    ID recs weekly CRP and ESR, which are trending down   · Hold off on thoracolumbar MRI as pain in right hip and prior MRI unremarkable  · Opioid, benzo, and Neurontin taper scanned into media  · Neurontin taper already ordered  · Will have to follow taper as pt cannot be d/c on controlled substances and pain will need to be controlled w/ Cymbalta, Robaxin, ibuprofen, and tylenol

## 2020-09-06 NOTE — PLAN OF CARE
Problem: Prexisting or High Potential for Compromised Skin Integrity  Goal: Skin integrity is maintained or improved  Description: INTERVENTIONS:  - Identify patients at risk for skin breakdown  - Assess and monitor skin integrity  - Assess and monitor nutrition and hydration status  - Monitor labs   - Assess for incontinence   - Turn and reposition patient  - Assist with mobility/ambulation  - Relieve pressure over bony prominences  - Avoid friction and shearing  - Provide appropriate hygiene as needed including keeping skin clean and dry  - Evaluate need for skin moisturizer/barrier cream  - Collaborate with interdisciplinary team   - Patient/family teaching  - Consider wound care consult   Outcome: Progressing     Problem: PAIN - ADULT  Goal: Verbalizes/displays adequate comfort level or baseline comfort level  Description: Interventions:  - Encourage patient to monitor pain and request assistance  - Assess pain using appropriate pain scale  - Administer analgesics based on type and severity of pain and evaluate response  - Implement non-pharmacological measures as appropriate and evaluate response  - Consider cultural and social influences on pain and pain management  - Notify physician/advanced practitioner if interventions unsuccessful or patient reports new pain  Outcome: Progressing     Problem: INFECTION - ADULT  Goal: Absence or prevention of progression during hospitalization  Description: INTERVENTIONS:  - Assess and monitor for signs and symptoms of infection  - Monitor lab/diagnostic results  - Monitor all insertion sites, i e  indwelling lines, tubes, and drains  - Monitor endotracheal if appropriate and nasal secretions for changes in amount and color  - [place] appropriate cooling/warming therapies per order  - Administer medications as ordered  - Instruct and encourage patient and family to use good hand hygiene technique  - Identify and instruct in appropriate isolation precautions for identified infection/condition  Outcome: Progressing  Goal: Absence of fever/infection during neutropenic period  Description: INTERVENTIONS:  - Monitor WBC    Outcome: Progressing     Problem: SAFETY ADULT  Goal: Patient will remain free of falls  Description: INTERVENTIONS:  - Assess patient frequently for physical needs  -  Identify cognitive and physical deficits and behaviors that affect risk of falls    -  [place] fall precautions as indicated by assessment   - Educate patient/family on patient safety including physical limitations  - Instruct patient to call for assistance with activity based on assessment  - Modify environment to reduce risk of injury  - Consider OT/PT consult to assist with strengthening/mobility  Outcome: Progressing  Goal: Maintain or return to baseline ADL function  Description: INTERVENTIONS:  -  Assess patient's ability to carry out ADLs; assess patient's baseline for ADL function and identify physical deficits which impact ability to perform ADLs (bathing, care of mouth/teeth, toileting, grooming, dressing, etc )  - Assess/evaluate cause of self-care deficits   - Assess range of motion  - Assess patient's mobility; develop plan if impaired  - Assess patient's need for assistive devices and provide as appropriate  - Encourage maximum independence but intervene and supervise when necessary  - Involve family in performance of ADLs  - Assess for home care needs following discharge   - Consider OT consult to assist with ADL evaluation and planning for discharge  - Provide patient education as appropriate  Outcome: Progressing  Goal: Maintain or return mobility status to optimal level  Description: INTERVENTIONS:  - Assess patient's baseline mobility status (ambulation, transfers, stairs, etc )    - Identify cognitive and physical deficits and behaviors that affect mobility  - Identify mobility aids required to assist with transfers and/or ambulation (gait belt, sit-to-stand, lift, walker, cane, etc )  - [place] fall precautions as indicated by assessment  - Record patient progress and toleration of activity level on Mobility SBAR; progress patient to next Phase/Stage  - Instruct patient to call for assistance with activity based on assessment  - Consider rehabilitation consult to assist with strengthening/weightbearing, etc   Outcome: Progressing     Problem: Knowledge Deficit  Goal: Patient/family/caregiver demonstrates understanding of disease process, treatment plan, medications, and discharge instructions  Description: Complete learning assessment and assess knowledge base    Interventions:  - Provide teaching at level of understanding  - Provide teaching via preferred learning methods  Outcome: Progressing     Problem: CARDIOVASCULAR - ADULT  Goal: Maintains optimal cardiac output and hemodynamic stability  Description: INTERVENTIONS:  - Monitor I/O, vital signs and rhythm  - Monitor for S/S and trends of decreased cardiac output  - Administer and titrate ordered vasoactive medications to optimize hemodynamic stability  - Assess quality of pulses, skin color and temperature  - Assess for signs of decreased coronary artery perfusion  - Instruct patient to report change in severity of symptoms  Outcome: Progressing  Goal: Absence of cardiac dysrhythmias or at baseline rhythm  Description: INTERVENTIONS:  - Continuous cardiac monitoring, vital signs, obtain 12 lead EKG if ordered  - Administer antiarrhythmic and heart rate control medications as ordered  - Monitor electrolytes and administer replacement therapy as ordered  Outcome: Progressing     Problem: METABOLIC, FLUID AND ELECTROLYTES - ADULT  Goal: Electrolytes maintained within normal limits  Description: INTERVENTIONS:  - Monitor labs and assess patient for signs and symptoms of electrolyte imbalances  - Administer electrolyte replacement as ordered  - Monitor response to electrolyte replacements, including repeat lab results as appropriate  - Instruct patient on fluid and nutrition as appropriate  Outcome: Progressing  Goal: Fluid balance maintained  Description: INTERVENTIONS:  - Monitor labs   - Monitor I/O and WT  - Instruct patient on fluid and nutrition as appropriate  - Assess for signs & symptoms of volume excess or deficit  Outcome: Progressing  Goal: Glucose maintained within target range  Description: INTERVENTIONS:  - Monitor Blood Glucose as ordered  - Assess for signs and symptoms of hyperglycemia and hypoglycemia  - Administer ordered medications to maintain glucose within target range  - Assess nutritional intake and initiate nutrition service referral as needed  Outcome: Progressing     Problem: HEMATOLOGIC - ADULT  Goal: Maintains hematologic stability  Description: INTERVENTIONS  - Assess for signs and symptoms of bleeding or hemorrhage  - Monitor labs  - Administer supportive blood products/factors as ordered and appropriate  Outcome: Progressing     Problem: Potential for Falls  Goal: Patient will remain free of falls  Description: INTERVENTIONS:  - Assess patient frequently for physical needs  -  Identify cognitive and physical deficits and behaviors that affect risk of falls  -  [place] fall precautions as indicated by assessment   - Educate patient/family on patient safety including physical limitations  - Instruct patient to call for assistance with activity based on assessment  - Modify environment to reduce risk of injury  - Consider OT/PT consult to assist with strengthening/mobility  Outcome: Progressing     Problem: Nutrition/Hydration-ADULT  Goal: Nutrient/Hydration intake appropriate for improving, restoring or maintaining nutritional needs  Description: Monitor and assess patient's nutrition/hydration status for malnutrition  Collaborate with interdisciplinary team and initiate plan and interventions as ordered  Monitor patient's weight and dietary intake as ordered or per policy   Utilize nutrition screening tool and intervene as necessary  Determine patient's food preferences and provide high-protein, high-caloric foods as appropriate       INTERVENTIONS:  - Monitor oral intake, urinary output, labs, and treatment plans  - Assess nutrition and hydration status and recommend course of action  - Evaluate amount of meals eaten  - Assist patient with eating if necessary   - Allow adequate time for meals  - Recommend/ encourage appropriate diets, oral nutritional supplements, and vitamin/mineral supplements  - Order, calculate, and assess calorie counts as needed  - Recommend, monitor, and adjust tube feedings and TPN/PPN based on assessed needs  - Assess need for intravenous fluids  - Provide specific nutrition/hydration education as appropriate  - Include patient/family/caregiver in decisions related to nutrition  Outcome: Progressing

## 2020-09-07 ENCOUNTER — ANESTHESIA (INPATIENT)
Dept: PERIOP | Facility: HOSPITAL | Age: 30
DRG: 163 | End: 2020-09-07
Payer: COMMERCIAL

## 2020-09-07 VITALS — HEART RATE: 95 BPM

## 2020-09-07 PROBLEM — B18.2 CHRONIC HEPATITIS C VIRUS INFECTION (HCC): Status: ACTIVE | Noted: 2020-09-07

## 2020-09-07 PROBLEM — F11.90 CHRONIC, CONTINUOUS USE OF OPIOIDS: Status: ACTIVE | Noted: 2020-09-07

## 2020-09-07 LAB
ALBUMIN SERPL BCP-MCNC: 2.6 G/DL (ref 3.5–5)
ALP SERPL-CCNC: 192 U/L (ref 46–116)
ALT SERPL W P-5'-P-CCNC: 39 U/L (ref 12–78)
ANION GAP SERPL CALCULATED.3IONS-SCNC: 3 MMOL/L (ref 4–13)
AST SERPL W P-5'-P-CCNC: 73 U/L (ref 5–45)
BASOPHILS # BLD AUTO: 0.03 THOUSANDS/ΜL (ref 0–0.1)
BASOPHILS NFR BLD AUTO: 1 % (ref 0–1)
BILIRUB SERPL-MCNC: 0.25 MG/DL (ref 0.2–1)
BUN SERPL-MCNC: 15 MG/DL (ref 5–25)
CALCIUM SERPL-MCNC: 8.2 MG/DL (ref 8.3–10.1)
CHLORIDE SERPL-SCNC: 109 MMOL/L (ref 100–108)
CO2 SERPL-SCNC: 28 MMOL/L (ref 21–32)
CREAT SERPL-MCNC: 0.38 MG/DL (ref 0.6–1.3)
CRP SERPL QL: 5.8 MG/L
EOSINOPHIL # BLD AUTO: 0.92 THOUSAND/ΜL (ref 0–0.61)
EOSINOPHIL NFR BLD AUTO: 17 % (ref 0–6)
ERYTHROCYTE [DISTWIDTH] IN BLOOD BY AUTOMATED COUNT: 14.8 % (ref 11.6–15.1)
ERYTHROCYTE [SEDIMENTATION RATE] IN BLOOD: 12 MM/HOUR (ref 0–19)
GFR SERPL CREATININE-BSD FRML MDRD: 146 ML/MIN/1.73SQ M
GLUCOSE SERPL-MCNC: 90 MG/DL (ref 65–140)
HCT VFR BLD AUTO: 29.9 % (ref 34.8–46.1)
HGB BLD-MCNC: 9.2 G/DL (ref 11.5–15.4)
IMM GRANULOCYTES # BLD AUTO: 0.04 THOUSAND/UL (ref 0–0.2)
IMM GRANULOCYTES NFR BLD AUTO: 1 % (ref 0–2)
LYMPHOCYTES # BLD AUTO: 2.32 THOUSANDS/ΜL (ref 0.6–4.47)
LYMPHOCYTES NFR BLD AUTO: 42 % (ref 14–44)
MCH RBC QN AUTO: 27.3 PG (ref 26.8–34.3)
MCHC RBC AUTO-ENTMCNC: 30.8 G/DL (ref 31.4–37.4)
MCV RBC AUTO: 89 FL (ref 82–98)
MONOCYTES # BLD AUTO: 0.4 THOUSAND/ΜL (ref 0.17–1.22)
MONOCYTES NFR BLD AUTO: 7 % (ref 4–12)
NEUTROPHILS # BLD AUTO: 1.77 THOUSANDS/ΜL (ref 1.85–7.62)
NEUTS SEG NFR BLD AUTO: 32 % (ref 43–75)
NRBC BLD AUTO-RTO: 0 /100 WBCS
PLATELET # BLD AUTO: 283 THOUSANDS/UL (ref 149–390)
PMV BLD AUTO: 9 FL (ref 8.9–12.7)
POTASSIUM SERPL-SCNC: 4.3 MMOL/L (ref 3.5–5.3)
PROT SERPL-MCNC: 6.7 G/DL (ref 6.4–8.2)
RBC # BLD AUTO: 3.37 MILLION/UL (ref 3.81–5.12)
SODIUM SERPL-SCNC: 140 MMOL/L (ref 136–145)
WBC # BLD AUTO: 5.48 THOUSAND/UL (ref 4.31–10.16)

## 2020-09-07 PROCEDURE — 86140 C-REACTIVE PROTEIN: CPT | Performed by: HOSPITALIST

## 2020-09-07 PROCEDURE — 99232 SBSQ HOSP IP/OBS MODERATE 35: CPT | Performed by: HOSPITALIST

## 2020-09-07 PROCEDURE — 99232 SBSQ HOSP IP/OBS MODERATE 35: CPT | Performed by: PHYSICIAN ASSISTANT

## 2020-09-07 PROCEDURE — 85652 RBC SED RATE AUTOMATED: CPT | Performed by: HOSPITALIST

## 2020-09-07 PROCEDURE — 0CDXXZ0 EXTRACTION OF LOWER TOOTH, SINGLE, EXTERNAL APPROACH: ICD-10-PCS | Performed by: ORAL & MAXILLOFACIAL SURGERY

## 2020-09-07 PROCEDURE — 80053 COMPREHEN METABOLIC PANEL: CPT | Performed by: HOSPITALIST

## 2020-09-07 PROCEDURE — 99233 SBSQ HOSP IP/OBS HIGH 50: CPT | Performed by: INTERNAL MEDICINE

## 2020-09-07 PROCEDURE — 85025 COMPLETE CBC W/AUTO DIFF WBC: CPT | Performed by: HOSPITALIST

## 2020-09-07 RX ORDER — FENTANYL CITRATE/PF 50 MCG/ML
50 SYRINGE (ML) INJECTION
Status: DISCONTINUED | OUTPATIENT
Start: 2020-09-07 | End: 2020-09-07 | Stop reason: HOSPADM

## 2020-09-07 RX ORDER — LIDOCAINE HYDROCHLORIDE AND EPINEPHRINE 10; 10 MG/ML; UG/ML
INJECTION, SOLUTION INFILTRATION; PERINEURAL AS NEEDED
Status: DISCONTINUED | OUTPATIENT
Start: 2020-09-07 | End: 2020-09-07 | Stop reason: HOSPADM

## 2020-09-07 RX ORDER — PROPOFOL 10 MG/ML
INJECTION, EMULSION INTRAVENOUS CONTINUOUS PRN
Status: DISCONTINUED | OUTPATIENT
Start: 2020-09-07 | End: 2020-09-07

## 2020-09-07 RX ORDER — PROPOFOL 10 MG/ML
INJECTION, EMULSION INTRAVENOUS AS NEEDED
Status: DISCONTINUED | OUTPATIENT
Start: 2020-09-07 | End: 2020-09-07

## 2020-09-07 RX ORDER — KETAMINE HYDROCHLORIDE 50 MG/ML
INJECTION, SOLUTION, CONCENTRATE INTRAMUSCULAR; INTRAVENOUS AS NEEDED
Status: DISCONTINUED | OUTPATIENT
Start: 2020-09-07 | End: 2020-09-07

## 2020-09-07 RX ORDER — HYDROMORPHONE HCL 110MG/55ML
PATIENT CONTROLLED ANALGESIA SYRINGE INTRAVENOUS AS NEEDED
Status: DISCONTINUED | OUTPATIENT
Start: 2020-09-07 | End: 2020-09-07

## 2020-09-07 RX ORDER — SODIUM CHLORIDE, SODIUM LACTATE, POTASSIUM CHLORIDE, CALCIUM CHLORIDE 600; 310; 30; 20 MG/100ML; MG/100ML; MG/100ML; MG/100ML
INJECTION, SOLUTION INTRAVENOUS CONTINUOUS PRN
Status: DISCONTINUED | OUTPATIENT
Start: 2020-09-07 | End: 2020-09-07

## 2020-09-07 RX ORDER — MORPHINE SULFATE 4 MG/ML
3 INJECTION, SOLUTION INTRAMUSCULAR; INTRAVENOUS ONCE
Status: COMPLETED | OUTPATIENT
Start: 2020-09-07 | End: 2020-09-07

## 2020-09-07 RX ORDER — FENTANYL CITRATE 50 UG/ML
INJECTION, SOLUTION INTRAMUSCULAR; INTRAVENOUS AS NEEDED
Status: DISCONTINUED | OUTPATIENT
Start: 2020-09-07 | End: 2020-09-07

## 2020-09-07 RX ORDER — HYDROMORPHONE HCL/PF 1 MG/ML
0.4 SYRINGE (ML) INJECTION
Status: DISCONTINUED | OUTPATIENT
Start: 2020-09-07 | End: 2020-09-07 | Stop reason: HOSPADM

## 2020-09-07 RX ORDER — MIDAZOLAM HYDROCHLORIDE 2 MG/2ML
INJECTION, SOLUTION INTRAMUSCULAR; INTRAVENOUS AS NEEDED
Status: DISCONTINUED | OUTPATIENT
Start: 2020-09-07 | End: 2020-09-07

## 2020-09-07 RX ORDER — DEXMEDETOMIDINE HYDROCHLORIDE 100 UG/ML
INJECTION, SOLUTION INTRAVENOUS AS NEEDED
Status: DISCONTINUED | OUTPATIENT
Start: 2020-09-07 | End: 2020-09-07

## 2020-09-07 RX ADMIN — CEFAZOLIN SODIUM 2000 MG: 2 SOLUTION INTRAVENOUS at 21:58

## 2020-09-07 RX ADMIN — GABAPENTIN 100 MG: 100 CAPSULE ORAL at 17:41

## 2020-09-07 RX ADMIN — MIRTAZAPINE 30 MG: 30 TABLET, FILM COATED ORAL at 21:59

## 2020-09-07 RX ADMIN — HYDROMORPHONE HYDROCHLORIDE 1 MG: 2 INJECTION, SOLUTION INTRAMUSCULAR; INTRAVENOUS; SUBCUTANEOUS at 08:49

## 2020-09-07 RX ADMIN — MORPHINE SULFATE 2 MG: 2 INJECTION, SOLUTION INTRAMUSCULAR; INTRAVENOUS at 11:45

## 2020-09-07 RX ADMIN — ALPRAZOLAM 1 MG: 0.5 TABLET ORAL at 21:58

## 2020-09-07 RX ADMIN — ALPRAZOLAM 1 MG: 0.5 TABLET ORAL at 12:01

## 2020-09-07 RX ADMIN — MUPIROCIN 1 APPLICATION: 20 OINTMENT TOPICAL at 09:55

## 2020-09-07 RX ADMIN — MIDAZOLAM 2 MG: 1 INJECTION INTRAMUSCULAR; INTRAVENOUS at 08:50

## 2020-09-07 RX ADMIN — KETAMINE HYDROCHLORIDE 50 MG: 50 INJECTION, SOLUTION INTRAMUSCULAR; INTRAVENOUS at 08:24

## 2020-09-07 RX ADMIN — OXYCODONE HYDROCHLORIDE 15 MG: 10 TABLET ORAL at 09:56

## 2020-09-07 RX ADMIN — MORPHINE SULFATE 2 MG: 2 INJECTION, SOLUTION INTRAMUSCULAR; INTRAVENOUS at 20:26

## 2020-09-07 RX ADMIN — Medication 250 MG: at 09:56

## 2020-09-07 RX ADMIN — SENNOSIDES 8.6 MG: 8.6 TABLET ORAL at 17:38

## 2020-09-07 RX ADMIN — CALCIUM ACETATE 667 MG: 667 CAPSULE ORAL at 11:47

## 2020-09-07 RX ADMIN — GABAPENTIN 100 MG: 100 CAPSULE ORAL at 09:56

## 2020-09-07 RX ADMIN — SENNOSIDES 8.6 MG: 8.6 TABLET ORAL at 09:56

## 2020-09-07 RX ADMIN — OXYCODONE HYDROCHLORIDE 15 MG: 10 TABLET ORAL at 05:52

## 2020-09-07 RX ADMIN — DEXMEDETOMIDINE 10 MCG: 100 INJECTION, SOLUTION, CONCENTRATE INTRAVENOUS at 08:55

## 2020-09-07 RX ADMIN — IBUPROFEN 600 MG: 600 TABLET ORAL at 14:06

## 2020-09-07 RX ADMIN — MUPIROCIN 1 APPLICATION: 20 OINTMENT TOPICAL at 20:25

## 2020-09-07 RX ADMIN — ENOXAPARIN SODIUM 60 MG: 60 INJECTION SUBCUTANEOUS at 10:00

## 2020-09-07 RX ADMIN — METHOCARBAMOL TABLETS 750 MG: 750 TABLET, COATED ORAL at 11:47

## 2020-09-07 RX ADMIN — FENTANYL CITRATE 100 MCG: 50 INJECTION, SOLUTION INTRAMUSCULAR; INTRAVENOUS at 08:44

## 2020-09-07 RX ADMIN — METHOCARBAMOL TABLETS 750 MG: 750 TABLET, COATED ORAL at 17:37

## 2020-09-07 RX ADMIN — MORPHINE SULFATE 3 MG: 4 INJECTION INTRAVENOUS at 14:05

## 2020-09-07 RX ADMIN — CEFAZOLIN SODIUM 2000 MG: 2 SOLUTION INTRAVENOUS at 14:06

## 2020-09-07 RX ADMIN — OLANZAPINE 5 MG: 5 TABLET, FILM COATED ORAL at 22:00

## 2020-09-07 RX ADMIN — HYDROMORPHONE HYDROCHLORIDE 1 MG: 2 INJECTION, SOLUTION INTRAMUSCULAR; INTRAVENOUS; SUBCUTANEOUS at 08:44

## 2020-09-07 RX ADMIN — CHLORHEXIDINE GLUCONATE 0.12% ORAL RINSE 15 ML: 1.2 LIQUID ORAL at 20:25

## 2020-09-07 RX ADMIN — MELATONIN 6 MG: at 20:27

## 2020-09-07 RX ADMIN — PROPOFOL 40 MG: 10 INJECTION, EMULSION INTRAVENOUS at 08:24

## 2020-09-07 RX ADMIN — SODIUM CHLORIDE, SODIUM LACTATE, POTASSIUM CHLORIDE, AND CALCIUM CHLORIDE: .6; .31; .03; .02 INJECTION, SOLUTION INTRAVENOUS at 08:10

## 2020-09-07 RX ADMIN — NYSTATIN: 100000 CREAM TOPICAL at 17:47

## 2020-09-07 RX ADMIN — MIDAZOLAM 2 MG: 1 INJECTION INTRAMUSCULAR; INTRAVENOUS at 08:10

## 2020-09-07 RX ADMIN — MIDAZOLAM 2 MG: 1 INJECTION INTRAMUSCULAR; INTRAVENOUS at 08:53

## 2020-09-07 RX ADMIN — CHLORHEXIDINE GLUCONATE 0.12% ORAL RINSE 15 ML: 1.2 LIQUID ORAL at 09:55

## 2020-09-07 RX ADMIN — METHOCARBAMOL TABLETS 750 MG: 750 TABLET, COATED ORAL at 05:50

## 2020-09-07 RX ADMIN — DULOXETINE HYDROCHLORIDE 60 MG: 60 CAPSULE, DELAYED RELEASE ORAL at 10:00

## 2020-09-07 RX ADMIN — PROPOFOL 100 MCG/KG/MIN: 10 INJECTION, EMULSION INTRAVENOUS at 08:25

## 2020-09-07 RX ADMIN — DEXMEDETOMIDINE 10 MCG: 100 INJECTION, SOLUTION, CONCENTRATE INTRAVENOUS at 08:50

## 2020-09-07 RX ADMIN — OXYCODONE HYDROCHLORIDE 15 MG: 10 TABLET ORAL at 17:46

## 2020-09-07 RX ADMIN — ENOXAPARIN SODIUM 60 MG: 60 INJECTION SUBCUTANEOUS at 20:26

## 2020-09-07 RX ADMIN — PANTOPRAZOLE SODIUM 20 MG: 20 TABLET, DELAYED RELEASE ORAL at 05:50

## 2020-09-07 RX ADMIN — MIDAZOLAM 2 MG: 1 INJECTION INTRAMUSCULAR; INTRAVENOUS at 08:55

## 2020-09-07 RX ADMIN — Medication 250 MG: at 17:37

## 2020-09-07 RX ADMIN — CALCIUM ACETATE 667 MG: 667 CAPSULE ORAL at 17:37

## 2020-09-07 RX ADMIN — DEXMEDETOMIDINE 10 MCG: 100 INJECTION, SOLUTION, CONCENTRATE INTRAVENOUS at 08:47

## 2020-09-07 RX ADMIN — FENTANYL CITRATE 100 MCG: 50 INJECTION, SOLUTION INTRAMUSCULAR; INTRAVENOUS at 08:16

## 2020-09-07 RX ADMIN — IBUPROFEN 600 MG: 600 TABLET ORAL at 21:58

## 2020-09-07 RX ADMIN — CEFAZOLIN SODIUM 2000 MG: 2 SOLUTION INTRAVENOUS at 05:49

## 2020-09-07 NOTE — NURSING NOTE
No pregnancy test on file since last admision, patient stated she had one on 9/6/20 no results in chart, refused to take one the day of surgery 9/7/20 patient said she cant urinate, it is difficult for her  Anesthesiologist aware, patient aware of risks

## 2020-09-07 NOTE — ANESTHESIA PREPROCEDURE EVALUATION
Procedure:  EXTRACTION TOOTH 32 (N/A Mouth)    Relevant Problems   ANESTHESIA (within normal limits)      CARDIO   (+) DVT of axillary vein, acute left (HCC)      ENDO (within normal limits)      GI/HEPATIC   (+) Chronic hepatitis C virus infection (HCC)      /RENAL   (+) Acute pyelonephritis      GYN (within normal limits)      HEMATOLOGY   (+) Anemia      MUSCULOSKELETAL (within normal limits)      NEURO/PSYCH  Heroin abuse      (+) Chronic, continuous use of opioids      PULMONARY   (+) Loculated pleural effusion   Results for Carolina Kahn (MRN [id number]) as of 9/7/2020 07:36   Ref  Range 9/5/2020 05:50   Red Blood Cell Count Latest Ref Range: 3 81 - 5 12 Million/uL 3 48 (L)   Hemoglobin Latest Ref Range: 11 5 - 15 4 g/dL 9 5 (L)   HCT Latest Ref Range: 34 8 - 46 1 % 30 4 (L)   MCV Latest Ref Range: 82 - 98 fL 87   MCH Latest Ref Range: 26 8 - 34 3 pg 27 3   MCHC Latest Ref Range: 31 4 - 37 4 g/dL 31 3 (L)   RDW Latest Ref Range: 11 6 - 15 1 % 15 1   Platelet Count Latest Ref Range: 149 - 390 Thousands/uL 273   MPV Latest Ref Range: 8 9 - 12 7 fL 8 6 (L)   nRBC Latest Units: /100 WBCs 0       SUMMARY     LEFT VENTRICLE:  Systolic function was normal  Ejection fraction was estimated to be 55 %  There were no regional wall motion abnormalities      RIGHT VENTRICLE:  The ventricle was mildly dilated  Systolic function was normal   Wall thickness was increased      RIGHT ATRIUM:  The atrium was mildly dilated      TRICUSPID VALVE:  There was severe regurgitation  There was a medium-sized, pedunculated, echogenic, fixed vegetation, measuring 14 mm x 8 mm on the anterior leaflet on the right atrial aspect  Anesthesia Plan  ASA Score- 4     Anesthesia Type- IV sedation with anesthesia with ASA Monitors  Additional Monitors:   Airway Plan:     Comment: Patient seen and evaluated  Unable to provide urine sample and reports very low possibility of pregnancy   States pregnancy test was obtained on floor yesterday and was negative (non documented)  Risks detailed with patient who wishes to waive preoperative pregnancy test and proceed with procedure          Plan Factors-Exercise tolerance (METS): >4 METS  Chart reviewed  EKG reviewed  Induction- intravenous  Postoperative Plan-     Informed Consent- Anesthetic plan and risks discussed with patient  I personally reviewed this patient with the CRNA  Discussed and agreed on the Anesthesia Plan with the CRNA  Hitesh Champagne

## 2020-09-07 NOTE — ANESTHESIA POSTPROCEDURE EVALUATION
Post-Op Assessment Note    CV Status:  Stable    Pain management: adequate     Mental Status:  Awake   Hydration Status:  Stable   PONV Controlled:  Controlled   Airway Patency:  Patent      Post Op Vitals Reviewed: Yes      Staff: Anesthesiologist, CRNA         No complications documented      BP   94/48   Temp      Pulse  76   Resp   18   SpO2   100

## 2020-09-07 NOTE — OP NOTE
OPERATIVE REPORT  PATIENT NAME: Marylin Chu    :  1990  MRN: [id number]  Pt Location:  OR ROOM 06    SURGERY DATE: 2020    Surgeon(s) and Role:     * Arun Rebollar DMD - Primary    Preop Diagnosis:  Poor dentition [K08 9]    Post-Op Diagnosis Codes:     * Poor dentition [K08 9]    Procedure(s) (LRB):  EXTRACTION TOOTH 32 (N/A)    Specimen(s):  * No specimens in log *    Estimated Blood Loss:   Minimal    Drains:  * No LDAs found *    Anesthesia Type:   IV Sedation with Anesthesia    Operative Indications:  Poor dentition [K08 9]  Awaiting cardiac valve replacement     Operative Findings:  Grossly carious root #79    Complications:   None    Procedure and Technique:  The patient was greeted at the bedside in the holding area  The surgical consent was reviewed and all questions answered  Patient has multiple decayed teeth but she only wants to removed those that are non restorable  She is currently asymptomatic in the oral region  The patient was brought in the operating room and placed on the operating room table, in supine position   A time out was performed  Anesthesia placed appropriate monitors and induced intravenous anesthesia  The patient was draped and prepped in the usual fashion for oral surgery procedure  The oral cavity was inspected and tooth #32 was identified as the only non restorable tooth  1% lidocaine 1:100,000 epinephrine was injected locally and for nerve block to cover the planned surgical area  A throat drape was placed for airway protection  Attention was made to the right mandible  A 15 blade was used to make a sulcular incision with distal hockey stick release  A full thickness mucoperiosteal flap was reflected  An elevator was used to luxate the retaioned root and it was extracted with rongeur  Copious normal saline irrigation of the flap and sockets was performed  3-0 chromic gut sutures were placed  Positive hemostasis was achieved      At this point the surgery was considered completed  The throat drape was removed and the oral cavity was suctioned free  Sterile drapes were removed and the face cleansed with normal saline and dried  Patient was emerged from anesthesia, extubated without issues and transported to the post anesthesia care unit           I was present for the entire procedure    Patient Disposition:  PACU     SIGNATURE: Arun Rebollar DMD  DATE: September 7, 2020  TIME: 8:58 AM

## 2020-09-07 NOTE — PROGRESS NOTES
Progress Note - Cardiothoracic Surgery   Margaret Severino 34 y o  female MRN: [id number]  Unit/Bed#: [facility] 526-01 Encounter: [id number]      24 Hour Events:  Dental extraction completed yesterday      Medications:   Scheduled Meds:  Current Facility-Administered Medications   Medication Dose Route Frequency Provider Last Rate    acetaminophen  650 mg Oral Q6H PRN Gin Ledesma MD      ALPRAZolam  1 mg Oral Q4H PRN Ashley Kaur MD      alteplase  2 mg Intracatheter Q12H PRN Ashley Kaur MD      calcium acetate  667 mg Oral TID With Meals Ashley Kaur MD      calcium carbonate  1,000 mg Oral Daily PRN Ashley Kaur MD      cefazolin  2,000 mg Intravenous Q8H Ashley Kaur MD 2,000 mg (09/07/20 0549)    chlorhexidine  15 mL Swish & Spit Q12H [address] Ashley Kaur MD      dicyclomine  10 mg Oral TID PRN Ashley Kaur MD      DULoxetine  60 mg Oral Daily Gin Ledesma MD      enoxaparin  1 mg/kg Subcutaneous Q12H [address] Gin Ledesma MD      gabapentin  100 mg Oral BID Ashley Kaur MD      Followed by   Pat Williamson ON 9/9/2020] gabapentin  100 mg Oral HS Gin Ledesma MD      hydrocortisone   Topical 4x Daily PRN Ashley Kaur MD      ibuprofen  600 mg Oral Q8H [address] Gin Ledesma MD      iohexol  50 mL Oral 90 min pre-procedure Ashley Kaur MD      lidocaine  2 patch Topical Daily Gin Ledesma MD      magnesium citrate  296 mL Oral Daily PRN Gin Ledesma MD      melatonin  6 mg Oral HS PRN Ashley Kaur MD      methocarbamol  750 mg Oral Q6H [address] Gin Ledesma MD      methylnaltrexone bromide  12 mg Subcutaneous Q48H PRN Ashley Kaur MD      miconazole   Topical BID Ashley Kaur MD      mirtazapine  30 mg Oral HS Gin Ledesma MD      morphine injection  2 mg Intravenous Q4H PRN Ashley Kaur MD      mupirocin  1 application Nasal [id number] [address], MD      nystatin Topical BID Maryuri Basurto MD      OLANZapine  5 mg Oral HS Gin Ledesma MD      ondansetron  4 mg Intravenous Q6H PRN Maryuri Basurto MD      oxyCODONE  10 mg Oral Q4H PRN Maryuri Basurto MD      oxyCODONE  15 mg Oral Q4H PRN Maryuri Basurto MD      pantoprazole  20 mg Oral Early Morning Gin Ledesma MD      saccharomyces boulardii  250 mg Oral BID Maryuri Basurto MD      senna  1 tablet Oral BID Maryuri Basurto MD       Continuous Infusions:     Results:   Results from last 7 days   Lab Units 09/07/20  0610 09/05/20  0550   WBC Thousand/uL 5 48 5 98   HEMOGLOBIN g/dL 9 2* 9 5*   HEMATOCRIT % 29 9* 30 4*   PLATELETS Thousands/uL 283 273     Results from last 7 days   Lab Units 09/07/20  0609 09/05/20  0550 09/01/20  2142   POTASSIUM mmol/L 4 3 3 7 4 0   CHLORIDE mmol/L 109* 107 104   CO2 mmol/L 28 28 29   BUN mg/dL 15 17 14   CREATININE mg/dL 0 38* 0 45* 0 54*   CALCIUM mg/dL 8 2* 8 5 8 7           Studies:     Echo: EF 55%, severe TR, medium sized fixed vegetation, 14 mm x 8 mm on anterior leaflet, mildly dilated RV     Carotid artery duplex:   < 50% right carotid stenosis  Vertebral artery flow is antegrade and There is no significant subclavian artery   < 50% left carotid stenosis  Vertebral artery flow is antegrade and There is no significant subclavian artery    Vitals:   Vitals:    09/07/20 0855 09/07/20 0900 09/07/20 0913 09/07/20 0944   BP: (!) 94/48 (!) 76/41 92/58 97/60   BP Location:    Right arm   Pulse: 78 76 88 97   Resp: 14 14  16   Temp: 98 4 °F (36 9 °C)  98 1 °F (36 7 °C) (!) 96 1 °F (35 6 °C)   TempSrc:    Oral   SpO2: 100%  97%    Weight:       Height:         Physical Exam:    HEENT/NECK:  PERRLA  No jugular venous distention      Cardiac: Regular rate and rhythm  Pulmonary:  Breath sounds clear bilaterally  Abdomen:  Non-tender and Non-distended  Extremities: Extremities warm/dry and 1+ edema B/L  Neuro: Alert and oriented X 3 and Sensation is grossly intact  Skin: Warm/Dry, without rashes or lesions  Assessment:    Tricuspid  Valve endocarditis; Ongoing TVR workup    Plan:  Patient agreeable to proceed with surgery;   D/C Lovenox after tomorrow night's dose  OMFS consult completed; Plan for dental extractions tomorrow  Carotid U/S completed and acceptable      Blood type and cross match ordered for 9/8  Continue Mupirocin 2% nasal ointment q 12 hrs  Continue topical chlorhexidine bath and mouth rise  Preoperative oxygen, beta blocker, insulin, and antibiotics ordered tricuspid valve replacement scheduled for Thursday with SARAH Mccoy Persons: Romana Ontiveros PA-C  DATE: September 7, 2020  TIME: 10:35 AM

## 2020-09-07 NOTE — ASSESSMENT & PLAN NOTE
· Recurrent MSSA bacteremia  · Initially admitted to [address] [place] [address] from 07/15-blood culture were positive for MSSA bacteremia, but patient signed AMA on 07/22  Her repeat blood culture done on 7/21 was negative after 5 days  · [facility] on 07/30-blood culture came back positive for MSSA and she left again is medical advice on 08/04  Her blood cultures came back positive on 07/30, but the bacteremia cleared as of 8 /2  Patient left AMA on 08/04  · Readmitted to [address] [place] [address] on 08/10-blood culture came back positive for Staph aureus  · 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

## 2020-09-07 NOTE — PROGRESS NOTES
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:  Prolonged course of IV antibiotic was previously recommended, but patient has left AMA on 2 prior occasions (once at [facility] and once from [facility])    She remains hemodynamically stable  Repeat blood culture negative on 08/15/2020     -continue cefazolin at least through 9/26/2020 to complete 6 weeks from the 1st negative blood culture  -patient is planned for tricuspid valve surgery 09/10/2020  may need to extend the course of the antibiotics if operative cultures are positive  -recheck CBC with diff and CMP weekly while on the IV antibiotics     2  Tricuspid valve endocarditis, 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:   7/21 MELISSA showed large vegetation on TV with severe regurgitation  7/15 CT abdomen/pelvis also showed bilateral renal parenchymal abnormalities concerning for septic emboli   No intra-abdominal abscess   Repeat CT chest 09/04/2020 shows resolution of right-sided pleural effusion  -antibiotic plan as above  -patient to go to the OR this week for valve surgery  -CT surgery follow-up     3  Right iliacus muscle abscesses/sacroiliitis:  repeat imaging without remaining abscess   MRI now with evidence of sacroiliitis that I suspect is septic   There are no destructive changes or fluid collection   Suspect this is the cause of the patient's ongoing pain   Inflammatory markers continue to decrease, ESR now normal, CRP down to 5 (32>14>5)  -antibiotics as above  -recheck sedimentation rate and CRP weekly while on the IV antibiotics;  -pain management  -no clear indication for any surgical intervention for now       4  Back pain  [facility] [place] likely secondary to chronic pain, opioid dependence   8/1/20 Thoracic and lumbar spine MRIs performed at [facility] negative were for abscess or osteomyelitis   CT scan nondiagnostic for osteomyelitis or diskitis   Sedimentation rate and CRP are decreasing     -monitor back pain   -serial exams  -acute pain service follow-up  -recheck CRP and sedimentation rate weekly while on IV antibiotics     5  Active IV heroin abuse:   This is the causative factor for development of bacteremia and infective endocarditis   Patient has left AMA on prior occasions   HIV screen negative  -patient is not a candidate for at home PICC line/IV antibiotics  -acute pain service follow-up     6  Chronic HCV infection:   Recent HIV was negative   The LFTs are waxing waning  -monitor LFTs   - outpatient follow-up with GI     7  Left upper extremity DVT-in the setting of PICC line use   Patient now on anticoagulation  -vascular follow-up  -serial exam  -anticoagulation     Discussed the above management plan with the primary service  Plan mentioned above discussed with the patient    Antibiotics:  Cefazolin restart 29  Negative blood cultures 21    Subjective:  Patient has no fever, chills, sweats; no nausea, vomiting, diarrhea; no cough, shortness of breath; no pain  No new symptoms  Complaining of lower back pain and right hip pain    Objective:  Vitals:  Temp:  [96 1 °F (35 6 °C)-98 5 °F (36 9 °C)] 96 1 °F (35 6 °C)  HR:  [68-97] 97  Resp:  [14-20] 16  BP: ()/(41-73) 97/60  SpO2:  [97 %-100 %] 97 %  Temp (24hrs), Av 9 °F (36 6 °C), Min:96 1 °F (35 6 °C), Max:98 5 °F (36 9 °C)  Current: Temperature: (!) 96 1 °F (35 6 °C)    Physical Exam:   General Appearance:  Alert, interactive, nontoxic, no acute distress  Throat: Oropharynx moist without lesions  Lungs:   Clear to auscultation bilaterally; no wheezes, rhonchi or rales; respirations unlabored   Heart:  RRR; no murmur, rub or gallop   Abdomen:   Soft, non-tender, non-distended, positive bowel sounds       Extremities: No clubbing, cyanosis or edema  No limited active or passive range of motion of right hip; No erythema, fluctuance, swelling over right hip or thigh area   Skin: No new rashes or lesions  No draining wounds noted         Labs, Imaging, & Other studies:   All pertinent labs and imaging studies were personally reviewed  Results from last 7 days   Lab Units 09/07/20  0610 09/05/20  0550   WBC Thousand/uL 5 48 5 98   HEMOGLOBIN g/dL 9 2* 9 5*   PLATELETS Thousands/uL 283 273     Results from last 7 days   Lab Units 09/07/20  0609 09/05/20  0550 09/01/20  2142   SODIUM mmol/L 140 140 139   POTASSIUM mmol/L 4 3 3 7 4 0   CHLORIDE mmol/L 109* 107 104   CO2 mmol/L 28 28 29   BUN mg/dL 15 17 14   CREATININE mg/dL 0 38* 0 45* 0 54*   EGFR ml/min/1 73sq m 146 138 130   CALCIUM mg/dL 8 2* 8 5 8 7   AST U/L 73*  --  147*   ALT U/L 39  --  72   ALK PHOS U/L 192*  --  284*     Results from last 7 days   Lab Units 09/04/20  1050   MRSA CULTURE ONLY  No Methicillin Resistant Stapylococcus aureus (MRSA) after 24 hours         Results from last 7 days   Lab Units 09/07/20  0609   CRP mg/L 5 8*

## 2020-09-07 NOTE — ASSESSMENT & PLAN NOTE
· Patient noted to have tricuspid while vegetation on echocardiogram done in July  Patient had a transthoracic and also transesophageal echocardiogram done during the last hospital stay  · With septic emboli to lungs and posterior iliacus muscle  · Patient is transferred over here to be evaluated by CT surgery  · She was noncommittal to abstain from illegal drugs or unintended use of medications  · When she continues to abstain and completes iv abx treatment they would consider surgical correction - this will likely be as OP   · Continue IV cefazolin as above  · repeat echo - shows to severe TR, 14 x 8 mm mobile vegetation on tricuspid valve  No pericardial effusion, midly dilated RV (not mentioned before)  · Reconsulted CT surgery who recommend tricuspid valve replacement  Preoperative investigations being performed, OR next week  Patient understands that after this wall replacement if she ends up being back on drugs and gets it in the affected she will not get another surgery  · OMFS consulted for pre op clearance - s/p tooth extraction today   Having a lot of pain, had to give her an extra dose of 3mg IV morphine

## 2020-09-07 NOTE — PLAN OF CARE
Problem: Prexisting or High Potential for Compromised Skin Integrity  Goal: Skin integrity is maintained or improved  Description: INTERVENTIONS:  - Identify patients at risk for skin breakdown  - Assess and monitor skin integrity  - Assess and monitor nutrition and hydration status  - Monitor labs   - Assess for incontinence   - Turn and reposition patient  - Assist with mobility/ambulation  - Relieve pressure over bony prominences  - Avoid friction and shearing  - Provide appropriate hygiene as needed including keeping skin clean and dry  - Evaluate need for skin moisturizer/barrier cream  - Collaborate with interdisciplinary team   - Patient/family teaching  - Consider wound care consult   Outcome: Progressing     Problem: PAIN - ADULT  Goal: Verbalizes/displays adequate comfort level or baseline comfort level  Description: Interventions:  - Encourage patient to monitor pain and request assistance  - Assess pain using appropriate pain scale  - Administer analgesics based on type and severity of pain and evaluate response  - Implement non-pharmacological measures as appropriate and evaluate response  - Consider cultural and social influences on pain and pain management  - Notify physician/advanced practitioner if interventions unsuccessful or patient reports new pain  Outcome: Progressing     Problem: INFECTION - ADULT  Goal: Absence or prevention of progression during hospitalization  Description: INTERVENTIONS:  - Assess and monitor for signs and symptoms of infection  - Monitor lab/diagnostic results  - Monitor all insertion sites, i e  indwelling lines, tubes, and drains  - Monitor endotracheal if appropriate and nasal secretions for changes in amount and color  - [place] appropriate cooling/warming therapies per order  - Administer medications as ordered  - Instruct and encourage patient and family to use good hand hygiene technique  - Identify and instruct in appropriate isolation precautions for identified infection/condition  Outcome: Progressing  Goal: Absence of fever/infection during neutropenic period  Description: INTERVENTIONS:  - Monitor WBC    Outcome: Progressing     Problem: SAFETY ADULT  Goal: Patient will remain free of falls  Description: INTERVENTIONS:  - Assess patient frequently for physical needs  -  Identify cognitive and physical deficits and behaviors that affect risk of falls    -  [place] fall precautions as indicated by assessment   - Educate patient/family on patient safety including physical limitations  - Instruct patient to call for assistance with activity based on assessment  - Modify environment to reduce risk of injury  - Consider OT/PT consult to assist with strengthening/mobility  Outcome: Progressing  Goal: Maintain or return to baseline ADL function  Description: INTERVENTIONS:  -  Assess patient's ability to carry out ADLs; assess patient's baseline for ADL function and identify physical deficits which impact ability to perform ADLs (bathing, care of mouth/teeth, toileting, grooming, dressing, etc )  - Assess/evaluate cause of self-care deficits   - Assess range of motion  - Assess patient's mobility; develop plan if impaired  - Assess patient's need for assistive devices and provide as appropriate  - Encourage maximum independence but intervene and supervise when necessary  - Involve family in performance of ADLs  - Assess for home care needs following discharge   - Consider OT consult to assist with ADL evaluation and planning for discharge  - Provide patient education as appropriate  Outcome: Progressing  Goal: Maintain or return mobility status to optimal level  Description: INTERVENTIONS:  - Assess patient's baseline mobility status (ambulation, transfers, stairs, etc )    - Identify cognitive and physical deficits and behaviors that affect mobility  - Identify mobility aids required to assist with transfers and/or ambulation (gait belt, sit-to-stand, lift, walker, cane, etc )  - [place] fall precautions as indicated by assessment  - Record patient progress and toleration of activity level on Mobility SBAR; progress patient to next Phase/Stage  - Instruct patient to call for assistance with activity based on assessment  - Consider rehabilitation consult to assist with strengthening/weightbearing, etc   Outcome: Progressing     Problem: Knowledge Deficit  Goal: Patient/family/caregiver demonstrates understanding of disease process, treatment plan, medications, and discharge instructions  Description: Complete learning assessment and assess knowledge base    Interventions:  - Provide teaching at level of understanding  - Provide teaching via preferred learning methods  Outcome: Progressing     Problem: CARDIOVASCULAR - ADULT  Goal: Maintains optimal cardiac output and hemodynamic stability  Description: INTERVENTIONS:  - Monitor I/O, vital signs and rhythm  - Monitor for S/S and trends of decreased cardiac output  - Administer and titrate ordered vasoactive medications to optimize hemodynamic stability  - Assess quality of pulses, skin color and temperature  - Assess for signs of decreased coronary artery perfusion  - Instruct patient to report change in severity of symptoms  Outcome: Progressing  Goal: Absence of cardiac dysrhythmias or at baseline rhythm  Description: INTERVENTIONS:  - Continuous cardiac monitoring, vital signs, obtain 12 lead EKG if ordered  - Administer antiarrhythmic and heart rate control medications as ordered  - Monitor electrolytes and administer replacement therapy as ordered  Outcome: Progressing     Problem: METABOLIC, FLUID AND ELECTROLYTES - ADULT  Goal: Electrolytes maintained within normal limits  Description: INTERVENTIONS:  - Monitor labs and assess patient for signs and symptoms of electrolyte imbalances  - Administer electrolyte replacement as ordered  - Monitor response to electrolyte replacements, including repeat lab results as appropriate  - Instruct patient on fluid and nutrition as appropriate  Outcome: Progressing  Goal: Fluid balance maintained  Description: INTERVENTIONS:  - Monitor labs   - Monitor I/O and WT  - Instruct patient on fluid and nutrition as appropriate  - Assess for signs & symptoms of volume excess or deficit  Outcome: Progressing  Goal: Glucose maintained within target range  Description: INTERVENTIONS:  - Monitor Blood Glucose as ordered  - Assess for signs and symptoms of hyperglycemia and hypoglycemia  - Administer ordered medications to maintain glucose within target range  - Assess nutritional intake and initiate nutrition service referral as needed  Outcome: Progressing     Problem: HEMATOLOGIC - ADULT  Goal: Maintains hematologic stability  Description: INTERVENTIONS  - Assess for signs and symptoms of bleeding or hemorrhage  - Monitor labs  - Administer supportive blood products/factors as ordered and appropriate  Outcome: Progressing     Problem: Potential for Falls  Goal: Patient will remain free of falls  Description: INTERVENTIONS:  - Assess patient frequently for physical needs  -  Identify cognitive and physical deficits and behaviors that affect risk of falls  -  [place] fall precautions as indicated by assessment   - Educate patient/family on patient safety including physical limitations  - Instruct patient to call for assistance with activity based on assessment  - Modify environment to reduce risk of injury  - Consider OT/PT consult to assist with strengthening/mobility  Outcome: Progressing     Problem: Nutrition/Hydration-ADULT  Goal: Nutrient/Hydration intake appropriate for improving, restoring or maintaining nutritional needs  Description: Monitor and assess patient's nutrition/hydration status for malnutrition  Collaborate with interdisciplinary team and initiate plan and interventions as ordered  Monitor patient's weight and dietary intake as ordered or per policy   Utilize nutrition screening tool and intervene as necessary  Determine patient's food preferences and provide high-protein, high-caloric foods as appropriate       INTERVENTIONS:  - Monitor oral intake, urinary output, labs, and treatment plans  - Assess nutrition and hydration status and recommend course of action  - Evaluate amount of meals eaten  - Assist patient with eating if necessary   - Allow adequate time for meals  - Recommend/ encourage appropriate diets, oral nutritional supplements, and vitamin/mineral supplements  - Order, calculate, and assess calorie counts as needed  - Recommend, monitor, and adjust tube feedings and TPN/PPN based on assessed needs  - Assess need for intravenous fluids  - Provide specific nutrition/hydration education as appropriate  - Include patient/family/caregiver in decisions related to nutrition  Outcome: Progressing

## 2020-09-07 NOTE — ASSESSMENT & PLAN NOTE
· Patient was complaining of severe back pain mainly in the lower part of the back and also in the sacral region  · Patient had MRI of the lumbar and thoracic spine during the recent hospital stay in [facility] which was unremarkable  · - continue Cymbalta 30 mg daily - increased to 60 mg daily on 9/1  · - continue robaxin to 750 mg q 6  · - continue oxy 10 mg and 15 mg  prn  · - continue iv morphine - decrease to 2q4 prn today  · -continue tylenol prn - refused ATC  · - continue lidocaine patch if pt does not refuse  · -continue gabapentin to 100 TID - decrease to BID tomorrow  · add ibuprofen RTC and ppi for GI ppx  · Iliacus muscle abscess is noted on CT scan, continue antibiotic treatment as above, no indication for drainage at this time  · Repeat CT scan shows improvement in septic emboli to iliacus muscle  · ketamine infusion discontinued  · Patient has been reluctant with interventions and procedures  She cites that she wants IV pain meds before intervention  Extensive discussion regarding acute pain service recommendations  Will proceed with following recommendations by acute pain service  · MRI completed 8/30 shows sacroiliitis which ID recs against surgical intervention    ID recs weekly CRP and ESR, which are trending down   · Hold off on thoracolumbar MRI as pain in right hip and prior MRI unremarkable  · Opioid, benzo, and Neurontin taper scanned into media  · Neurontin taper already ordered  · Will have to follow taper as pt cannot be d/c on controlled substances and pain will need to be controlled w/ Cymbalta, Robaxin, ibuprofen, and tylenol  · 9/6: post tooth extraction, having lot of pain in tooth & hip, states 2 mg IV morphine doesn't help so had to give her one dose 3 mg

## 2020-09-07 NOTE — PROGRESS NOTES
Progress Note - Mary Garcia 1990, 34 y o  female MRN: [id number]    Unit/Bed#: [facility] 526-01 Encounter: [id number]    Primary Care Provider: Albino Goltz, PA-C   Date and time admitted to hospital: 8/12/2020  7:39 PM        Rash  Assessment & Plan  · Bilateral buttocks, papular itchy rash in a linear fashion  · Less likely fungal, allergic vs monitor for shingles  · Will trial topical miconazole, if doesn't work try topical benadryl    Transaminitis  Assessment & Plan  Check acute hep panel - pt reports hx of Hep C  Positive for hep C antibodies  AST and ALP continue to be up and down    DVT of axillary vein, acute left [facility])  Assessment & Plan  Increase lovenox to 60mg Q12h  Due to severe pain consulted with vascular surgery for treatment options - appreciated  Picc continues to function well and post recent blood culture is negative  Elevation of LUE  PICC team attempted to move picc line to right arm given left arm DVT pain  Unsuccessful  IR consulted   IR exchanged PICC 8/26  Needs 3 months of AC - can d/c on NOAC if affordable    hold Lovenox for tooth extraction today    Right hip pain  Assessment & Plan  · Patient was complaining of severe back pain mainly in the lower part of the back and also in the sacral region  · Patient had MRI of the lumbar and thoracic spine during the recent hospital stay in [facility] which was unremarkable      · - continue Cymbalta 30 mg daily - increased to 60 mg daily on 9/1  · - continue robaxin to 750 mg q 6  · - continue oxy 10 mg and 15 mg  prn  · - continue iv morphine - decrease to 2q4 prn today  · -continue tylenol prn - refused ATC  · - continue lidocaine patch if pt does not refuse  · -continue gabapentin to 100 TID - decrease to BID tomorrow  · add ibuprofen RTC and ppi for GI ppx  · Iliacus muscle abscess is noted on CT scan, continue antibiotic treatment as above, no indication for drainage at this time  · Repeat CT scan shows improvement in septic emboli to iliacus muscle  · ketamine infusion discontinued  · Patient has been reluctant with interventions and procedures  She cites that she wants IV pain meds before intervention  Extensive discussion regarding acute pain service recommendations  Will proceed with following recommendations by acute pain service  · MRI completed 8/30 shows sacroiliitis which ID recs against surgical intervention  ID recs weekly CRP and ESR, which are trending down   · Hold off on thoracolumbar MRI as pain in right hip and prior MRI unremarkable  · Opioid, benzo, and Neurontin taper scanned into media  · Neurontin taper already ordered  · Will have to follow taper as pt cannot be d/c on controlled substances and pain will need to be controlled w/ Cymbalta, Robaxin, ibuprofen, and tylenol  · 9/6: post tooth extraction, having lot of pain in tooth & hip, states 2 mg IV morphine doesn't help so had to give her one dose 3 mg    Bipolar 1 disorder ([facility] [address] )  Assessment & Plan  · Patient 8/27 agreed to psych eval which is apprecaited  · Psych added Zyprexa  · C/w Cymbalta and Remeron  · So far has been on p r n  IV Ativan  She states that it is not working for her  Getting panic attacks after passing away of her boyfriend about 2 days ago  · She discuss this was with Pain Service recommends changing her from IV Ativan to oral Xanax 1 mg q 4 hours p r n   Ordered that    Septic embolism ([facility] [place] [address] )  Assessment & Plan  · Patient noted to have abnormalities seen in the CT of the chest abdomen and pelvis concerning for septic emboli  · There is pleural effusion on the CT scan of the chest and also on repeat chest x-ray  · Patient is rather asymptomatic from pulmonary standpoint  · Continue with the antibiotics    · Thoracic surgery have evaluated the pt and she is not short of breath   · 8/15-imaging right hip shows no osseous involvement; concern for proximity of abscess status SI joint, but no SI joint involvement at this time  · Management as above under hip pain  · c/o pleuritic CP  CXR shows no acute finding  Suspect pain could be related to septic emboli  · Chest CT performed preoperatively today shows resolution of right pleural effusion and improvement of several of the previous opacities with some new opacities which probably are post infectious/inflammatory resolving changes      Acute bacterial endocarditis  Assessment & Plan  · Patient noted to have tricuspid while vegetation on echocardiogram done in July  Patient had a transthoracic and also transesophageal echocardiogram done during the last hospital stay  · With septic emboli to lungs and posterior iliacus muscle  · Patient is transferred over here to be evaluated by CT surgery  · She was noncommittal to abstain from illegal drugs or unintended use of medications  · When she continues to abstain and completes iv abx treatment they would consider surgical correction - this will likely be as OP   · Continue IV cefazolin as above  · repeat echo - shows to severe TR, 14 x 8 mm mobile vegetation on tricuspid valve  No pericardial effusion, midly dilated RV (not mentioned before)  · Reconsulted CT surgery who recommend tricuspid valve replacement  Preoperative investigations being performed, OR next week  Patient understands that after this wall replacement if she ends up being back on drugs and gets it in the affected she will not get another surgery  · OMFS consulted for pre op clearance - s/p tooth extraction today  Having a lot of pain, had to give her an extra dose of 3mg IV morphine    * Bacteremia due to Staphylococcus aureus  Assessment & Plan  · Recurrent MSSA bacteremia  · Initially admitted to [facility] from 07/15-blood culture were positive for MSSA bacteremia, but patient signed AMA on 07/22    Her repeat blood culture done on 7/21 was negative after 5 days  · [facility] on 07/30-blood culture came back positive for MSSA and she left again is medical advice on 08/04  Her blood cultures came back positive on 07/30, but the bacteremia cleared as of 8 /2  Patient left AMA on 08/04  · Readmitted to [address] [place] [address] on 08/10-blood culture came back positive for Staph aureus  · 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 during the last hospital stay from 7/15-7/22  · Plan is to continue IV cefazolin for a 6 week course per ID recommendations from 8/15 when B Cx became neg till 9/26  · 8/26 removed PICC and PICC cx neg  · B Cx from 8/26 neg        [facility] Internal Medicine Progress Note  Patient: Sajna Love 34 y o  female   MRN: [id number]  PCP: Sheldon Gomez PA-C  Unit/Bed#: [facility] 526-01 Encounter: [id number]  Date Of Visit: 09/07/20    Assessment:    Principal Problem:    Bacteremia due to Staphylococcus aureus  Active Problems:    Acute bacterial endocarditis    Septic embolism (HCC)    Bipolar 1 disorder ([facility] )    Right hip pain    Intravenous drug abuse ([facility] )    DVT of axillary vein, acute left (HCC)    Transaminitis    Rash    Anemia    Chronic hepatitis C virus infection ([place] [address] )    Chronic, continuous use of opioids      Plan:       VTE Pharmacologic Prophylaxis:   Pharmacologic: Enoxaparin (Lovenox)  Mechanical VTE Prophylaxis in Place: Yes    Patient Centered Rounds: I have performed bedside rounds with nursing staff today  Discussions with Specialists or Other Care Team Provider:     Education and Discussions with Family / Patient: patient    Time Spent for Care: 30 minutes  More than 50% of total time spent on counseling and coordination of care as described above      Current Length of Stay: 26 day(s)    Current Patient Status: Inpatient   Certification Statement: The patient will continue to require additional inpatient hospital stay due to needs valve replacement    Discharge Plan / Estimated Discharge Date:     Code Status: Level 1 - Full Code      Subjective:   Saw her post procedure and crying because of lot of pain, tells me that she slept in a wrong position last night which has worsened her hip pain and now tooth pain    Objective:     Vitals:   Temp (24hrs), Av 9 °F (36 6 °C), Min:96 1 °F (35 6 °C), Max:98 5 °F (36 9 °C)    Temp:  [96 1 °F (35 6 °C)-98 5 °F (36 9 °C)] 98 °F (36 7 °C)  HR:  [68-97] 97  Resp:  [14-20] 16  BP: ()/(41-73) 113/65  SpO2:  [97 %-100 %] 97 %  Body mass index is 23 3 kg/m²  Input and Output Summary (last 24 hours): Intake/Output Summary (Last 24 hours) at 2020 1625  Last data filed at 2020 0911  Gross per 24 hour   Intake 894 ml   Output    Net 894 ml       Physical Exam:     Physical Exam  Vitals signs reviewed  HENT:      Head: Normocephalic and atraumatic  Cardiovascular:      Rate and Rhythm: Normal rate and regular rhythm  Heart sounds: No murmur  No gallop  Pulmonary:      Effort: Pulmonary effort is normal  No respiratory distress  Breath sounds: Normal breath sounds  No wheezing  Abdominal:      General: There is no distension  Palpations: Abdomen is soft  Tenderness: There is no abdominal tenderness  Neurological:      Mental Status: She is alert and oriented to person, place, and time  Additional Data:     Labs:    Results from last 7 days   Lab Units 20  0610   WBC Thousand/uL 5 48   HEMOGLOBIN g/dL 9 2*   HEMATOCRIT % 29 9*   PLATELETS Thousands/uL 283   NEUTROS PCT % 32*   LYMPHS PCT % 42   MONOS PCT % 7   EOS PCT % 17*     Results from last 7 days   Lab Units 20  0609   POTASSIUM mmol/L 4 3   CHLORIDE mmol/L 109*   CO2 mmol/L 28   BUN mg/dL 15   CREATININE mg/dL 0 38*   CALCIUM mg/dL 8 2*   ALK PHOS U/L 192*   ALT U/L 39   AST U/L 73*           * I Have Reviewed All Lab Data Listed Above  * Additional Pertinent Lab Tests Reviewed:  All Labs Within Last 24 Hours Reviewed    Imaging:    Imaging Reports Reviewed Today Include:   Imaging Personally Reviewed by Myself Includes:      Recent Cultures (last 7 days):           Last 24 Hours Medication List:   Current Facility-Administered Medications   Medication Dose Route Frequency Provider Last Rate    acetaminophen  650 mg Oral Q6H PRN Gin Ledesam MD      ALPRAZolam  1 mg Oral Q4H PRN Fran Kurtz MD      alteplase  2 mg Intracatheter Q12H PRN Fran Kurtz MD      calcium acetate  667 mg Oral TID With Meals Fran Kurtz MD      calcium carbonate  1,000 mg Oral Daily PRN Fran Kurtz MD      cefazolin  2,000 mg Intravenous Q8H Gin Ledesma MD 2,000 mg (09/07/20 1406)    chlorhexidine  15 mL Swish & Spit Q12H [facility] Fran Kurtz MD      dicyclomine  10 mg Oral TID PRN Fran Kurtz MD      DULoxetine  60 mg Oral Daily Fran Kurtz MD      enoxaparin  1 mg/kg Subcutaneous Q12H [facility] Ave Arauz PA-C      gabapentin  100 mg Oral BID Fran Kurtz MD      Followed by   Diogenes Charles ON 9/9/2020] gabapentin  100 mg Oral HS Gin B MD Baltazar      hydrocortisone   Topical 4x Daily PRN Fran Kurtz MD      ibuprofen  600 mg Oral Q8H [facility] Ledesma MD      iohexol  50 mL Oral 90 min pre-procedure Fran Kurtz MD      lidocaine  2 patch Topical Daily Ginsusan Ledesma MD      magnesium citrate  296 mL Oral Daily PRN Gin Ledesma MD      melatonin  6 mg Oral HS PRN Fran Kurtz MD      methocarbamol  750 mg Oral Q6H [facility] Gin Ledesma MD      methylnaltrexone bromide  12 mg Subcutaneous Q48H PRN Fran Kurtz MD      miconazole   Topical BID Fran Kurtz MD      mirtazapine  30 mg Oral HS Gin Ledesma MD      morphine injection  2 mg Intravenous Q4H PRN Fran Kurtz MD      mupirocin  1 application Nasal V48F [facility] Fran Kurtz MD      nystatin   Topical BID Gin Ledesma MD      OLANZapine  5 mg Oral HS Gin Ledesma MD      ondansetron  4 mg Intravenous Q6H PRN Fran Kurtz MD  oxyCODONE  10 mg Oral Q4H PRN Delana Aase, MD      oxyCODONE  15 mg Oral Q4H PRN Delana Aase, MD      pantoprazole  20 mg Oral Early Morning Gin Ledesma MD      saccharomyces boulardii  250 mg Oral BID Delana Aase, MD      senna  1 tablet Oral BID Delana Aase, MD          Today, Patient Was Seen By: Delana Aase, MD    ** Please Note: This note has been constructed using a voice recognition system   **

## 2020-09-07 NOTE — ASSESSMENT & PLAN NOTE
Increase lovenox to 60mg Q12h  Due to severe pain consulted with vascular surgery for treatment options - appreciated  Picc continues to function well and post recent blood culture is negative  Elevation of LUE  PICC team attempted to move picc line to right arm given left arm DVT pain  Unsuccessful   IR consulted   IR exchanged PICC 8/26  Needs 3 months of AC - can d/c on NOAC if affordable    hold Lovenox for tooth extraction today

## 2020-09-08 PROCEDURE — 99231 SBSQ HOSP IP/OBS SF/LOW 25: CPT | Performed by: THORACIC SURGERY (CARDIOTHORACIC VASCULAR SURGERY)

## 2020-09-08 PROCEDURE — 99232 SBSQ HOSP IP/OBS MODERATE 35: CPT | Performed by: INTERNAL MEDICINE

## 2020-09-08 PROCEDURE — 99233 SBSQ HOSP IP/OBS HIGH 50: CPT | Performed by: INTERNAL MEDICINE

## 2020-09-08 PROCEDURE — 99233 SBSQ HOSP IP/OBS HIGH 50: CPT | Performed by: PHYSICIAN ASSISTANT

## 2020-09-08 RX ORDER — KETOROLAC TROMETHAMINE 30 MG/ML
15 INJECTION, SOLUTION INTRAMUSCULAR; INTRAVENOUS ONCE
Status: COMPLETED | OUTPATIENT
Start: 2020-09-08 | End: 2020-09-08

## 2020-09-08 RX ADMIN — OXYCODONE HYDROCHLORIDE 15 MG: 10 TABLET ORAL at 05:34

## 2020-09-08 RX ADMIN — MUPIROCIN 1 APPLICATION: 20 OINTMENT TOPICAL at 09:45

## 2020-09-08 RX ADMIN — CALCIUM ACETATE 667 MG: 667 CAPSULE ORAL at 16:39

## 2020-09-08 RX ADMIN — MORPHINE SULFATE 2 MG: 2 INJECTION, SOLUTION INTRAMUSCULAR; INTRAVENOUS at 20:57

## 2020-09-08 RX ADMIN — ACETAMINOPHEN 650 MG: 325 TABLET, FILM COATED ORAL at 17:29

## 2020-09-08 RX ADMIN — IBUPROFEN 600 MG: 600 TABLET ORAL at 05:30

## 2020-09-08 RX ADMIN — CEFAZOLIN SODIUM 2000 MG: 2 SOLUTION INTRAVENOUS at 05:29

## 2020-09-08 RX ADMIN — CHLORHEXIDINE GLUCONATE 0.12% ORAL RINSE 15 ML: 1.2 LIQUID ORAL at 20:40

## 2020-09-08 RX ADMIN — SENNOSIDES 8.6 MG: 8.6 TABLET ORAL at 17:25

## 2020-09-08 RX ADMIN — OXYCODONE HYDROCHLORIDE 15 MG: 10 TABLET ORAL at 16:39

## 2020-09-08 RX ADMIN — MUPIROCIN 1 APPLICATION: 20 OINTMENT TOPICAL at 20:40

## 2020-09-08 RX ADMIN — DULOXETINE HYDROCHLORIDE 60 MG: 60 CAPSULE, DELAYED RELEASE ORAL at 09:47

## 2020-09-08 RX ADMIN — KETOROLAC TROMETHAMINE 15 MG: 30 INJECTION, SOLUTION INTRAMUSCULAR at 16:41

## 2020-09-08 RX ADMIN — CEFAZOLIN SODIUM 2000 MG: 2 SOLUTION INTRAVENOUS at 13:39

## 2020-09-08 RX ADMIN — CHLORHEXIDINE GLUCONATE 0.12% ORAL RINSE 15 ML: 1.2 LIQUID ORAL at 09:45

## 2020-09-08 RX ADMIN — METHOCARBAMOL TABLETS 750 MG: 750 TABLET, COATED ORAL at 05:30

## 2020-09-08 RX ADMIN — MICONAZOLE NITRATE: 2 CREAM TOPICAL at 17:26

## 2020-09-08 RX ADMIN — ENOXAPARIN SODIUM 60 MG: 60 INJECTION SUBCUTANEOUS at 21:04

## 2020-09-08 RX ADMIN — PANTOPRAZOLE SODIUM 20 MG: 20 TABLET, DELAYED RELEASE ORAL at 05:30

## 2020-09-08 RX ADMIN — ALPRAZOLAM 1 MG: 0.5 TABLET ORAL at 20:51

## 2020-09-08 RX ADMIN — Medication 250 MG: at 17:25

## 2020-09-08 RX ADMIN — MIRTAZAPINE 30 MG: 30 TABLET, FILM COATED ORAL at 21:56

## 2020-09-08 RX ADMIN — OLANZAPINE 5 MG: 5 TABLET, FILM COATED ORAL at 21:57

## 2020-09-08 RX ADMIN — CALCIUM ACETATE 667 MG: 667 CAPSULE ORAL at 09:45

## 2020-09-08 RX ADMIN — GABAPENTIN 100 MG: 100 CAPSULE ORAL at 09:46

## 2020-09-08 RX ADMIN — ENOXAPARIN SODIUM 60 MG: 60 INJECTION SUBCUTANEOUS at 09:47

## 2020-09-08 RX ADMIN — SENNOSIDES 8.6 MG: 8.6 TABLET ORAL at 09:45

## 2020-09-08 RX ADMIN — GABAPENTIN 100 MG: 100 CAPSULE ORAL at 17:25

## 2020-09-08 RX ADMIN — METHOCARBAMOL TABLETS 750 MG: 750 TABLET, COATED ORAL at 17:24

## 2020-09-08 RX ADMIN — OXYCODONE HYDROCHLORIDE 15 MG: 10 TABLET ORAL at 09:52

## 2020-09-08 RX ADMIN — MORPHINE SULFATE 2 MG: 2 INJECTION, SOLUTION INTRAMUSCULAR; INTRAVENOUS at 06:31

## 2020-09-08 RX ADMIN — MORPHINE SULFATE 2 MG: 2 INJECTION, SOLUTION INTRAMUSCULAR; INTRAVENOUS at 11:40

## 2020-09-08 RX ADMIN — IBUPROFEN 600 MG: 600 TABLET ORAL at 21:56

## 2020-09-08 RX ADMIN — CEFAZOLIN SODIUM 2000 MG: 2 SOLUTION INTRAVENOUS at 20:42

## 2020-09-08 RX ADMIN — Medication 250 MG: at 09:45

## 2020-09-08 NOTE — UTILIZATION REVIEW
Continued Stay Review    Date: 9/7/20                    Current Patient Class: INpatient  Current Level of Care: MEd/surg    HPI:29 y o  female initially admitted on 8/12  Assessment/Plan:   9/7 ID consult:  Continue IV abx  Through at lease 9/26/2020  Not a candidate for home PICC line/IV abx  Recent HIV negative  Plan for OR this week  Carotid US completed and acceptable  ? Will have to follow taper as pt cannot be d/c on controlled substances and pain will need to be controlled w/ Cymbalta, Robaxin, ibuprofen, and tylenol    Given IV morphine s/p tooth extraction  Plan for tricuspid valve replacement  OPERATIVE NOTE  9/7/20  Procedure(s) (LRB):  EXTRACTION TOOTH 32 (N/A)  Operative Findings:  Grossly carious root #32   Anesthesia Type:   IV Sedation with Anesthesia     9/2 UPdate:  ALT, ALP are variable  Check acute hepatitis panel  Reports h/o hep c  Plan to continue IV abx  For 6 weeks  Pertinent Labs/Diagnostic Results:   9/4 VAS carotid complete study: CONCLUSION:     Impression  RIGHT:  There is no evidence of disease throughout the extracranial carotid arteries  Vertebral artery flow is antegrade  There is no significant subclavian artery disease  LEFT:  There is no evidence of disease throughout the extracranial carotid arteries  Vertebral artery flow is antegrade  There is no significant subclavian artery disease  9/4 XRay mandible: Limitations above  Absence of the crown of the right third mandibular molar  Clinical correlation regarding dental garcía is necessary  9/4 CT chest:   Right pleural effusion resolved  Mostly improved nodular and consolidative opacities related to septic emboli  Few opacities are new from prior chest CT but similar in appearance to other residual opacities are also likely post infectious/inflammatory  Consider additional 3 month follow-up to ensure resolution  Mild adenopathy unchanged     Results from last 7 days   Lab Units 09/04/20  1050 SARS-COV-2  Negative     Results from last 7 days   Lab Units 09/07/20  0610 09/05/20  0550   WBC Thousand/uL 5 48 5 98   HEMOGLOBIN g/dL 9 2* 9 5*   HEMATOCRIT % 29 9* 30 4*   PLATELETS Thousands/uL 283 273   NEUTROS ABS Thousands/µL 1 77* 1 85         Results from last 7 days   Lab Units 09/07/20  0609 09/05/20  0550 09/01/20  2142   SODIUM mmol/L 140 140 139   POTASSIUM mmol/L 4 3 3 7 4 0   CHLORIDE mmol/L 109* 107 104   CO2 mmol/L 28 28 29   ANION GAP mmol/L 3* 5 6   BUN mg/dL 15 17 14   CREATININE mg/dL 0 38* 0 45* 0 54*   EGFR ml/min/1 73sq m 146 138 130   CALCIUM mg/dL 8 2* 8 5 8 7   PHOSPHORUS mg/dL  --   --  5 5*     Results from last 7 days   Lab Units 09/07/20  0609 09/01/20  2142   AST U/L 73* 147*   ALT U/L 39 72   ALK PHOS U/L 192* 284*   TOTAL PROTEIN g/dL 6 7 7 7   ALBUMIN g/dL 2 6* 2 8*   TOTAL BILIRUBIN mg/dL 0 25 0 28         Results from last 7 days   Lab Units 09/07/20  0609 09/05/20  0550 09/01/20  2142   GLUCOSE RANDOM mg/dL 90 104 109       Results from last 7 days   Lab Units 09/01/20  2142   HEP B S AG  Non-reactive   HEP C AB  High Reactive*   HEP B C IGM  Non-reactive       Results from last 7 days   Lab Units 09/07/20  0609   CRP mg/L 5 8*   SED RATE mm/hour 12         Results from last 7 days   Lab Units 09/04/20  1050   CLARITY UA  Clear   COLOR UA  Dk Yellow   SPEC GRAV UA  1 043*   PH UA  5 5   GLUCOSE UA mg/dl Negative   KETONES UA mg/dl Trace*   BLOOD UA  Negative   PROTEIN UA mg/dl Trace*   NITRITE UA  Negative   BILIRUBIN UA  Interference- unable to analyze*   UROBILINOGEN UA E U /dl 0 2   LEUKOCYTES UA  Negative   WBC UA /hpf 4-10*   RBC UA /hpf None Seen   BACTERIA UA /hpf None Seen   EPITHELIAL CELLS WET PREP /hpf Moderate*       Vital Signs:   /Time  Temp   Pulse   Resp   BP   MAP (mmHg)   SpO2   Calculated FIO2 (%) - Nasal Cannula   Nasal Cannula O2 Flow Rate (L/min)   O2 Device  Patient Position - Orthostatic VS    09/07/20 1540  98 °F (36 7 °C)         113/65               Lying    09/07/20 1130                          None (Room air)      09/07/20 0955                          None (Room air)      09/07/20 0944  96 1 °F (35 6 °C)Abnormal     97   16   97/60                     09/07/20 0913  98 1 °F (36 7 °C)   88      92/58      97 %         None (Room air)      09/07/20 0900     76   14   76/41Abnormal                       09/07/20 0855  98 4 °F (36 9 °C)   78   14   94/48Abnormal        100 %   28   2 L/min   Nasal cannula      09/07/20 0717  98 2 °F (36 8 °C)   68   18   128/73   96   99 %                Medications:   Scheduled Medications:  calcium acetate, 667 mg, Oral, TID With Meals  cefazolin, 2,000 mg, Intravenous, Q8H  chlorhexidine, 15 mL, Swish & Spit, Q12H [facility]  DULoxetine, 60 mg, Oral, Daily  enoxaparin, 1 mg/kg, Subcutaneous, Q12H JEFF  gabapentin, 100 mg, Oral, BID    Followed by  Luz Jc ON 9/9/2020] gabapentin, 100 mg, Oral, HS  ibuprofen, 600 mg, Oral, Q8H JEFF  iohexol, 50 mL, Oral, 90 min pre-procedure  lidocaine, 2 patch, Topical, Daily  methocarbamol, 750 mg, Oral, Q6H JEFF  miconazole, , Topical, BID  mirtazapine, 30 mg, Oral, HS  mupirocin, 1 application, Nasal, R06H JEFF  nystatin, , Topical, BID  OLANZapine, 5 mg, Oral, HS  pantoprazole, 20 mg, Oral, Early Morning  saccharomyces boulardii, 250 mg, Oral, BID  senna, 1 tablet, Oral, BID      Continuous IV Infusions:     PRN Meds:  acetaminophen, 650 mg, Oral, Q6H PRN  ALPRAZolam, 1 mg, Oral, Q4H PRN  alteplase, 2 mg, Intracatheter, Q12H PRN  calcium carbonate, 1,000 mg, Oral, Daily PRN  dicyclomine, 10 mg, Oral, TID PRN  hydrocortisone, , Topical, 4x Daily PRN  magnesium citrate, 296 mL, Oral, Daily PRN  melatonin, 6 mg, Oral, HS PRN  methylnaltrexone bromide, 12 mg, Subcutaneous, Q48H PRN  morphine injection, 2 mg, Intravenous, Q4H PRN  ondansetron, 4 mg, Intravenous, Q6H PRN  oxyCODONE, 10 mg, Oral, Q4H PRN  oxyCODONE, 15 mg, Oral, Q4H PRN        Discharge Plan: TBD    Network Utilization Review Department  [email or web address]  ATTENTION: Please call with any questions or concerns to [phone] and carefully listen to the prompts so that you are directed to the right person  All voicemails are confidential   Diana Mosher all requests for admission clinical reviews, approved or denied determinations and any other requests to dedicated fax number below belonging to the campus where the patient is receiving treatment   List of dedicated fax numbers for the Facilities:  [address] [place] [address] DENIALS (Administrative/Medical Necessity) [phone]   [address] (Maternity/NICU/Pediatrics) [phone]   Chris Challenger [phone]   Jalen Gonzales [phone]   [facility] [phone]   [facility] [phone]   [address] [place] [address] [phone]   [facility]  [phone]   [address] [facility] [address]  [address] [facility] [address] [facility] [phone]

## 2020-09-08 NOTE — ASSESSMENT & PLAN NOTE
· Patient with history of drug abuse and likely the source of bacteremia  · Patient has a history of signing against medical advise  · May benefit from drug rehab eventually if she becomes agreeable to host services  She was not interested during my conversation  · During the hospital stay in July of this year patient was positive for [facility] ,cocaine and opiates  Although pt previosuly reported to Dr Domingo President that she had not used any drugs for several years now, but admitted to Dr Severo Raines that she was actively abusing cocaine and heroin  · She affirm to previous team, after her boyfriend , that she doesn't want to use drugs & get high again

## 2020-09-08 NOTE — ASSESSMENT & PLAN NOTE
· Recurrent MSSA bacteremia  · Initially admitted to [address] [place] [address] from 07/15-blood culture were positive for MSSA bacteremia, but patient signed AMA on 07/22  Her repeat blood culture done on 7/21 was negative after 5 days  · 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

## 2020-09-08 NOTE — PROGRESS NOTES
Progress Note - Cardiothoracic Surgery   Josem Friday 34 y o  female MRN: [id number]  Unit/Bed#: [facility] 526-01 Encounter: [id number]      24 Hour Events:  Patient refused to cooperate with exam  Per nursing note, same interaction this AM, refused morning medications       Medications:   Scheduled Meds:  Current Facility-Administered Medications   Medication Dose Route Frequency Provider Last Rate    acetaminophen  650 mg Oral Q6H PRN Gin Ledesma MD      ALPRAZolam  1 mg Oral Q4H PRN Chava Branch MD      alteplase  2 mg Intracatheter Q12H PRN Chava Branch MD      calcium acetate  667 mg Oral TID With Meals Chava Branch MD      calcium carbonate  1,000 mg Oral Daily PRN Chava Branch MD      cefazolin  2,000 mg Intravenous Q8H Gin Ledesma MD 2,000 mg (09/08/20 1339)    chlorhexidine  15 mL Swish & Spit Q12H [address] Chava Branch MD      dicyclomine  10 mg Oral TID PRN Chava Branch MD      DULoxetine  60 mg Oral Daily Chava Branch MD      enoxaparin  1 mg/kg Subcutaneous Q12H [address] Tulio Valencia PA-C      gabapentin  100 mg Oral BID Chava Branch MD      Followed by   Cait Lester ON 9/9/2020] gabapentin  100 mg Oral HS Gin Ledesma MD      hydrocortisone   Topical 4x Daily PRN Chava Branch MD      ibuprofen  600 mg Oral Q8H [address] Gin Ledesma MD      iohexol  50 mL Oral 90 min pre-procedure Chava Branch MD      lidocaine  2 patch Topical Daily Gin Ledesma MD      magnesium citrate  296 mL Oral Daily PRN Gin Ledesma MD      melatonin  6 mg Oral HS PRN Chava Branch MD      methocarbamol  750 mg Oral Q6H [address] Gin Ledesma MD      methylnaltrexone bromide  12 mg Subcutaneous Q48H PRN Chava Branch MD      miconazole   Topical BID Chava Branch MD      mirtazapine  30 mg Oral HS Gin Ledesma MD      morphine injection  2 mg Intravenous Q4H PRN Chava Branch MD      mupirocin 1 application Nasal B42N [facility] Gin Ledesma MD      nystatin   Topical BID Jorge Loza MD      OLANZapine  5 mg Oral HS Gin Ledesma MD      ondansetron  4 mg Intravenous Q6H PRN Jorge Loza MD      oxyCODONE  10 mg Oral Q4H PRN Jorge Loza MD      oxyCODONE  15 mg Oral Q4H PRN Jorge Loza MD      pantoprazole  20 mg Oral Early Morning Gin Ledesma MD      saccharomyces boulardii  250 mg Oral BID Jorge Loza MD      senna  1 tablet Oral BID Jorge Loza MD       Continuous Infusions:     Results:   Results from last 7 days   Lab Units 09/07/20  0610 09/05/20  0550   WBC Thousand/uL 5 48 5 98   HEMOGLOBIN g/dL 9 2* 9 5*   HEMATOCRIT % 29 9* 30 4*   PLATELETS Thousands/uL 283 273     Results from last 7 days   Lab Units 09/07/20  0609 09/05/20  0550 09/01/20  2142   POTASSIUM mmol/L 4 3 3 7 4 0   CHLORIDE mmol/L 109* 107 104   CO2 mmol/L 28 28 29   BUN mg/dL 15 17 14   CREATININE mg/dL 0 38* 0 45* 0 54*   CALCIUM mg/dL 8 2* 8 5 8 7           Studies:     Echo: EF 55%, severe TR, medium sized fixed vegetation, 14 mm x 8 mm on anterior leaflet, mildly dilated RV     Carotid artery duplex:   < 50% right carotid stenosis  Vertebral artery flow is antegrade and There is no significant subclavian artery   < 50% left carotid stenosis  Vertebral artery flow is antegrade and There is no significant subclavian artery    Vitals:   Vitals:    09/07/20 0944 09/07/20 1540 09/07/20 2325 09/08/20 0643   BP: 97/60 113/65  117/56   BP Location: Right arm Right arm     Pulse: 97   92   Resp: 16   17   Temp: (!) 96 1 °F (35 6 °C) 98 °F (36 7 °C)  97 8 °F (36 6 °C)   TempSrc: Oral Oral     SpO2:   97% 98%   Weight:       Height:         Physical Exam:    Patient refused to be examined  Assessment:    Tricuspid  Valve endocarditis; Ongoing TVR workup    Plan:  Patient agreeable to proceed with surgery; however, not cooperating with care today     D/C Lovenox after tonights   dose  Blood type and cross match ordered for 9/8  Continue Mupirocin 2% nasal ointment q 12 hrs  Continue topical chlorhexidine bath and mouth rise  Preoperative oxygen, beta blocker, insulin, and antibiotics ordered tricuspid valve replacement [facility] Roselyn Carroll PA-C  DATE: September 8, 2020  TIME: 2:16 PM

## 2020-09-08 NOTE — PROGRESS NOTES
Progress Note - Infectious Disease   Rachel Senior 34 y o  female MRN: [id number]  Unit/Bed#: [facility] 526-01 Encounter: [id number]      Impression/Plan:    1  Recurrent/persistent MSSA bacteremia with TV infective endocarditis:  Prolonged course of IV antibiotic was previously recommended, but patient has left AMA on 2 prior occasions (once at [facility] and once from [facility])    She remains hemodynamically stable  Repeat blood culture negative on 08/15/2020     -continue cefazolin at least through 9/26/2020 to complete 6 weeks from the 1st negative blood culture  -patient is planned for tricuspid valve surgery on 09/10/2020  may need to extend the course of the antibiotics if operative cultures are positive  -recheck CBC with diff and CMP weekly while on the IV antibiotics     2  Tricuspid valve endocarditis, 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:  MELISSA done 07/21/2020 showed large vegetation on TV with severe regurgitation  7/15 CT abdomen/pelvis also showed bilateral renal parenchymal abnormalities concerning for septic emboli, patient also had right-sided loculated pleural effusion   No intra-abdominal abscess   Repeat CT chest 09/04/2020 shows resolution of right-sided pleural effusion  -antibiotic plan as above  -patient to go to the OR this week for valve surgery; will follow up operative finding and operative culture once collected and planned duration of antibiotics accordingly  -CT surgery follow-up     3  Right iliacus muscle abscesses/sacroiliitis:  CT scan of right lower extremity on 08/21/2020 showed 9 mm iliacus muscle collection, decreasing in size    Follow-up imaging with MRI done 08/31/2020 with evidence of sacroiliitis that is probably septic   There are no destructive changes or fluid collection   Suspect this is the cause of the patient's ongoing pain   Inflammatory markers continue to decrease, ESR now normal, CRP down to 5 (32>14>5)  -antibiotics as above  -recheck sedimentation rate and CRP weekly while on the IV antibiotics;  -pain management  -no indication for any surgical intervention for now        4  Back pain  Laura Doan likely secondary to chronic pain, opioid dependence   20 Thoracic and lumbar spine MRIs performed at [facility] negative were for abscess or osteomyelitis   CT scan nondiagnostic for osteomyelitis or diskitis   Sedimentation rate and CRP continue to decrease as mentioned above    -monitor back pain   -serial exams  -acute pain service follow-up  -recheck CRP and sedimentation rate weekly while on IV antibiotics     5  Active IV heroin abuse:   This is the causative factor for development of bacteremia and infective endocarditis   Patient has left AMA on prior occasions   HIV screen negative  -patient is not a candidate for at home PICC line/IV antibiotics  -acute pain service follow-up     6  Chronic HCV infection:   Recent HIV was negative   The LFTs are waxing waning  -monitor LFTs   - outpatient follow-up with GI     7  Left upper extremity DVT-in the setting of PICC line use   Patient now on anticoagulation  -vascular follow-up  -serial exam  -anticoagulation     Discussed the above management plan with the primary service  Plan mentioned above discussed with the patient     Antibiotics:  Cefazolin restart 30  Negative blood cultures 22    Subjective:  Patient has no fever, chills, sweats; no nausea, vomiting, diarrhea; no cough, shortness of breath; persistent right hip pain, without recent worsening    Objective:  Vitals:  Temp:  [97 8 °F (36 6 °C)-98 °F (36 7 °C)] 97 8 °F (36 6 °C)  HR:  [92] 92  Resp:  [17] 17  BP: (113-117)/(56-65) 117/56  SpO2:  [97 %-98 %] 98 %  Temp (24hrs), Av 9 °F (36 6 °C), Min:97 8 °F (36 6 °C), Max:98 °F (36 7 °C)  Current: Temperature: 97 8 °F (36 6 °C)    Physical Exam:   General Appearance:  Alert, interactive, nontoxic, no acute distress  Throat: Oropharynx moist without lesions      Lungs: Clear to auscultation bilaterally; no wheezes, rhonchi or rales; respirations unlabored   Heart:  RRR; no murmur, rub or gallop   Abdomen:   Soft, non-tender, non-distended, positive bowel sounds  Extremities: No clubbing, cyanosis or edema   Skin: No new rashes or lesions  No draining wounds noted         Labs, Imaging, & Other studies:   All pertinent labs and imaging studies were personally reviewed  Results from last 7 days   Lab Units 09/07/20  0610 09/05/20  0550   WBC Thousand/uL 5 48 5 98   HEMOGLOBIN g/dL 9 2* 9 5*   PLATELETS Thousands/uL 283 273     Results from last 7 days   Lab Units 09/07/20  0609 09/05/20  0550 09/01/20  2142   SODIUM mmol/L 140 140 139   POTASSIUM mmol/L 4 3 3 7 4 0   CHLORIDE mmol/L 109* 107 104   CO2 mmol/L 28 28 29   BUN mg/dL 15 17 14   CREATININE mg/dL 0 38* 0 45* 0 54*   EGFR ml/min/1 73sq m 146 138 130   CALCIUM mg/dL 8 2* 8 5 8 7   AST U/L 73*  --  147*   ALT U/L 39  --  72   ALK PHOS U/L 192*  --  284*     Results from last 7 days   Lab Units 09/04/20  1050   MRSA CULTURE ONLY  No Methicillin Resistant Stapylococcus aureus (MRSA) after 24 hours         Results from last 7 days   Lab Units 09/07/20  0609   CRP mg/L 5 8*

## 2020-09-08 NOTE — ASSESSMENT & PLAN NOTE
· Patient 8/27 agreed to psych eval which is apprecaited  · Psych added Zyprexa  · C/w Cymbalta and Remeron  · So far has been on p r n  IV Ativan  She states that it is not working for her    Getting panic attacks after passing away of her boyfriend about 3 days ago  · She discuss this was with Pain Service recommends changing her from IV Ativan to oral Xanax 1 mg q 4 hours p r n   Ordered that

## 2020-09-08 NOTE — PLAN OF CARE
Problem: Prexisting or High Potential for Compromised Skin Integrity  Goal: Skin integrity is maintained or improved  Description: INTERVENTIONS:  - Identify patients at risk for skin breakdown  - Assess and monitor skin integrity  - Assess and monitor nutrition and hydration status  - Monitor labs   - Assess for incontinence   - Turn and reposition patient  - Assist with mobility/ambulation  - Relieve pressure over bony prominences  - Avoid friction and shearing  - Provide appropriate hygiene as needed including keeping skin clean and dry  - Evaluate need for skin moisturizer/barrier cream  - Collaborate with interdisciplinary team   - Patient/family teaching  - Consider wound care consult   Outcome: Progressing     Problem: PAIN - ADULT  Goal: Verbalizes/displays adequate comfort level or baseline comfort level  Description: Interventions:  - Encourage patient to monitor pain and request assistance  - Assess pain using appropriate pain scale  - Administer analgesics based on type and severity of pain and evaluate response  - Implement non-pharmacological measures as appropriate and evaluate response  - Consider cultural and social influences on pain and pain management  - Notify physician/advanced practitioner if interventions unsuccessful or patient reports new pain  Outcome: Progressing     Problem: INFECTION - ADULT  Goal: Absence or prevention of progression during hospitalization  Description: INTERVENTIONS:  - Assess and monitor for signs and symptoms of infection  - Monitor lab/diagnostic results  - Monitor all insertion sites, i e  indwelling lines, tubes, and drains  - Monitor endotracheal if appropriate and nasal secretions for changes in amount and color  - [place] appropriate cooling/warming therapies per order  - Administer medications as ordered  - Instruct and encourage patient and family to use good hand hygiene technique  - Identify and instruct in appropriate isolation precautions for identified infection/condition  Outcome: Progressing  Goal: Absence of fever/infection during neutropenic period  Description: INTERVENTIONS:  - Monitor WBC    Outcome: Progressing     Problem: SAFETY ADULT  Goal: Patient will remain free of falls  Description: INTERVENTIONS:  - Assess patient frequently for physical needs  -  Identify cognitive and physical deficits and behaviors that affect risk of falls    -  [place] fall precautions as indicated by assessment   - Educate patient/family on patient safety including physical limitations  - Instruct patient to call for assistance with activity based on assessment  - Modify environment to reduce risk of injury  - Consider OT/PT consult to assist with strengthening/mobility  Outcome: Progressing  Goal: Maintain or return to baseline ADL function  Description: INTERVENTIONS:  -  Assess patient's ability to carry out ADLs; assess patient's baseline for ADL function and identify physical deficits which impact ability to perform ADLs (bathing, care of mouth/teeth, toileting, grooming, dressing, etc )  - Assess/evaluate cause of self-care deficits   - Assess range of motion  - Assess patient's mobility; develop plan if impaired  - Assess patient's need for assistive devices and provide as appropriate  - Encourage maximum independence but intervene and supervise when necessary  - Involve family in performance of ADLs  - Assess for home care needs following discharge   - Consider OT consult to assist with ADL evaluation and planning for discharge  - Provide patient education as appropriate  Outcome: Progressing  Goal: Maintain or return mobility status to optimal level  Description: INTERVENTIONS:  - Assess patient's baseline mobility status (ambulation, transfers, stairs, etc )    - Identify cognitive and physical deficits and behaviors that affect mobility  - Identify mobility aids required to assist with transfers and/or ambulation (gait belt, sit-to-stand, lift, walker, cane, etc )  - [place] fall precautions as indicated by assessment  - Record patient progress and toleration of activity level on Mobility SBAR; progress patient to next Phase/Stage  - Instruct patient to call for assistance with activity based on assessment  - Consider rehabilitation consult to assist with strengthening/weightbearing, etc   Outcome: Progressing     Problem: Knowledge Deficit  Goal: Patient/family/caregiver demonstrates understanding of disease process, treatment plan, medications, and discharge instructions  Description: Complete learning assessment and assess knowledge base    Interventions:  - Provide teaching at level of understanding  - Provide teaching via preferred learning methods  Outcome: Progressing     Problem: CARDIOVASCULAR - ADULT  Goal: Maintains optimal cardiac output and hemodynamic stability  Description: INTERVENTIONS:  - Monitor I/O, vital signs and rhythm  - Monitor for S/S and trends of decreased cardiac output  - Administer and titrate ordered vasoactive medications to optimize hemodynamic stability  - Assess quality of pulses, skin color and temperature  - Assess for signs of decreased coronary artery perfusion  - Instruct patient to report change in severity of symptoms  Outcome: Progressing  Goal: Absence of cardiac dysrhythmias or at baseline rhythm  Description: INTERVENTIONS:  - Continuous cardiac monitoring, vital signs, obtain 12 lead EKG if ordered  - Administer antiarrhythmic and heart rate control medications as ordered  - Monitor electrolytes and administer replacement therapy as ordered  Outcome: Progressing     Problem: METABOLIC, FLUID AND ELECTROLYTES - ADULT  Goal: Electrolytes maintained within normal limits  Description: INTERVENTIONS:  - Monitor labs and assess patient for signs and symptoms of electrolyte imbalances  - Administer electrolyte replacement as ordered  - Monitor response to electrolyte replacements, including repeat lab results as appropriate  - Instruct patient on fluid and nutrition as appropriate  Outcome: Progressing  Goal: Fluid balance maintained  Description: INTERVENTIONS:  - Monitor labs   - Monitor I/O and WT  - Instruct patient on fluid and nutrition as appropriate  - Assess for signs & symptoms of volume excess or deficit  Outcome: Progressing  Goal: Glucose maintained within target range  Description: INTERVENTIONS:  - Monitor Blood Glucose as ordered  - Assess for signs and symptoms of hyperglycemia and hypoglycemia  - Administer ordered medications to maintain glucose within target range  - Assess nutritional intake and initiate nutrition service referral as needed  Outcome: Progressing     Problem: HEMATOLOGIC - ADULT  Goal: Maintains hematologic stability  Description: INTERVENTIONS  - Assess for signs and symptoms of bleeding or hemorrhage  - Monitor labs  - Administer supportive blood products/factors as ordered and appropriate  Outcome: Progressing     Problem: Potential for Falls  Goal: Patient will remain free of falls  Description: INTERVENTIONS:  - Assess patient frequently for physical needs  -  Identify cognitive and physical deficits and behaviors that affect risk of falls  -  [place] fall precautions as indicated by assessment   - Educate patient/family on patient safety including physical limitations  - Instruct patient to call for assistance with activity based on assessment  - Modify environment to reduce risk of injury  - Consider OT/PT consult to assist with strengthening/mobility  Outcome: Progressing     Problem: Nutrition/Hydration-ADULT  Goal: Nutrient/Hydration intake appropriate for improving, restoring or maintaining nutritional needs  Description: Monitor and assess patient's nutrition/hydration status for malnutrition  Collaborate with interdisciplinary team and initiate plan and interventions as ordered  Monitor patient's weight and dietary intake as ordered or per policy   Utilize nutrition screening tool and intervene as necessary  Determine patient's food preferences and provide high-protein, high-caloric foods as appropriate       INTERVENTIONS:  - Monitor oral intake, urinary output, labs, and treatment plans  - Assess nutrition and hydration status and recommend course of action  - Evaluate amount of meals eaten  - Assist patient with eating if necessary   - Allow adequate time for meals  - Recommend/ encourage appropriate diets, oral nutritional supplements, and vitamin/mineral supplements  - Order, calculate, and assess calorie counts as needed  - Recommend, monitor, and adjust tube feedings and TPN/PPN based on assessed needs  - Assess need for intravenous fluids  - Provide specific nutrition/hydration education as appropriate  - Include patient/family/caregiver in decisions related to nutrition  Outcome: Progressing

## 2020-09-08 NOTE — ASSESSMENT & PLAN NOTE
Increase lovenox to 60mg Q12h  Due to severe pain consulted with vascular surgery for treatment options - appreciated  Picc continues to function well and post recent blood culture is negative  Elevation of LUE  PICC team attempted to move picc line to right arm given left arm DVT pain  Unsuccessful   IR consulted   IR exchanged PICC 8/26  Needs 3 months of AC - can d/c on NOAC if affordable    hold Lovenox for tooth extraction today    9/8-will give Lovenox today; however discontinue after tonight's dose in preparation for CT surgery

## 2020-09-08 NOTE — PROGRESS NOTES
Patient accepted care at this time, compliant with all medications a this time and apologized for being" crabby"  in the morning to take medications

## 2020-09-08 NOTE — ASSESSMENT & PLAN NOTE
· Patient was complaining of severe back pain mainly in the lower part of the back and also in the sacral region  · Patient had MRI of the lumbar and thoracic spine during the recent hospital stay in [facility] which was unremarkable  · - continue Cymbalta 30 mg daily - increased to 60 mg daily on 9/1  · - continue robaxin to 750 mg q 6  · - continue oxy 10 mg and 15 mg  prn  · - continue iv morphine - decrease to 2q4 prn  · -continue tylenol prn - refused ATC  · - continue lidocaine patch if pt does not refuse  · -continue gabapentin to 100 TID - decrease to BID tomorrow  · add ibuprofen RTC and ppi for GI ppx  · Iliacus muscle abscess is noted on CT scan, continue antibiotic treatment as above, no indication for drainage at this time  · Repeat CT scan shows improvement in septic emboli to iliacus muscle  · ketamine infusion discontinued  · Patient has been reluctant with interventions and procedures  She cites that she wants IV pain meds before intervention  Extensive discussion regarding acute pain service recommendations  Will proceed with following recommendations by acute pain service  · MRI completed 8/30 shows sacroiliitis which ID recs against surgical intervention    ID recs weekly CRP and ESR, which are trending down   · Hold off on thoracolumbar MRI as pain in right hip and prior MRI unremarkable  · Opioid, benzo, and Neurontin taper scanned into media  · Neurontin taper already ordered  · Will have to follow taper as pt cannot be d/c on controlled substances and pain will need to be controlled w/ Cymbalta, Robaxin, ibuprofen, and tylenol  · 9/6: post tooth extraction, having lot of pain in tooth & hip, states 2 mg IV morphine doesn't help so had to give her one dose 3 mg

## 2020-09-08 NOTE — PROGRESS NOTES
Patient refused to take morning medications or be assessed    she stated she will do everything later  I asked when a better time would be for her to come back, she said ill call you when i'm ready don't come back until then  Patient was pleasant but did not wish to be bothered at this time   Will check back in later

## 2020-09-08 NOTE — PROGRESS NOTES
Progress Note - Acute Pain Service    Gian Weems 34 y o  female MRN: [id number]  Unit/Bed#: [facility] 526-01 Encounter: [id number]      Assessment:   Principal Problem:    Bacteremia due to Staphylococcus aureus  Active Problems:    Acute bacterial endocarditis    Septic embolism (HCC)    Bipolar 1 disorder (HCC)    Right hip pain    Intravenous drug abuse ([address] )    DVT of axillary vein, acute left (HCC)    Transaminitis    Rash    Anemia    Chronic hepatitis C virus infection (HCC)    Chronic, continuous use of opioids      Plan:   · Continue acetaminophen 650 mg p o  Q 6 hours p r n  mild pain  · Continue ibuprofen 600 mg p o  q 8 hours scheduled  · Continue oxycodone 10 mg p o  q 4 hours p r n  moderate pain  · Continue oxycodone 15 mg p o  q 4 hours p r n  severe pain  · Continue morphine 2 mg IV q 4 hours p r n  breakthrough pain  · Continue duloxetine 60 mg p o  daily  · Continue alprazolam 1 mg p o  q 4 hours p r n  anxiety  · Continue dicyclomine 10 mg p o  t i d  p r n  abdominal cramping  · Continue gabapentin 100 mg p o  b i d , decreased to 100 mg p o  daily at bedtime on 9/9/20  · Continue methocarbamol 750 mg p o  q 6 hours scheduled  · Can discontinue lidocaine patch as patient feels it is not helpful  · Continue bowel regimen to avoid opioid induced constipation  · Discussed with patient concern over postoperative pain following TVR  Explained that most patient is post sternotomy have well-controlled pain for short time postoperatively and that acute pain service will continue to follow throughout  APS will continue to follow; please contact [facility]PS ( [phone]) with any further questions    Pain History  Current pain location(s):  Right hip, right jaw  Pain Scale:   9/10  Quality:  Jaw:  Aching, hip: sharp  24 hour history:  Patient states pain continues to slowly improve  States lidocaine patch does not appear effective and would like to have that discontinued  Patient had dental extraction yesterday and had significant postoperative pain requiring an additional dose of IV morphine 3 mg however states pain is improved  Patient concerned about postoperative pain following tricuspid valve replacement Thursday  Discussed with patient  Opioid requirement previous 24 hours:  Oxycodone 60 mg p o , morphine 9 mg IV, hydromorphone 2 mg IV perioperatively, fentanyl 200 mcg IV perioperatively      Meds/Allergies   all current active meds have been reviewed, current meds:   Current Facility-Administered Medications   Medication Dose Route Frequency    acetaminophen (TYLENOL) tablet 650 mg  650 mg Oral Q6H PRN    ALPRAZolam (XANAX) tablet 1 mg  1 mg Oral Q4H PRN    alteplase (CATHFLO) injection 2 mg  2 mg Intracatheter Q12H PRN    calcium acetate (PHOSLO) capsule 667 mg  667 mg Oral TID With Meals    calcium carbonate (TUMS) chewable tablet 1,000 mg  1,000 mg Oral Daily PRN    ceFAZolin (ANCEF) IVPB (premix) 2,000 mg 50 mL  2,000 mg Intravenous Q8H    chlorhexidine (PERIDEX) 0 12 % oral rinse 15 mL  15 mL Swish & Spit Q12H [facility]    dicyclomine (BENTYL) capsule 10 mg  10 mg Oral TID PRN    DULoxetine (CYMBALTA) delayed release capsule 60 mg  60 mg Oral Daily    enoxaparin (LOVENOX) subcutaneous injection 60 mg  1 mg/kg Subcutaneous Q12H [facility]    gabapentin (NEURONTIN) capsule 100 mg  100 mg Oral BID    Followed by   Wanda Calloway ON 9/9/2020] gabapentin (NEURONTIN) capsule 100 mg  100 mg Oral HS    hydrocortisone 1 % cream   Topical 4x Daily PRN    ibuprofen (MOTRIN) tablet 600 mg  600 mg Oral Q8H [facility]    iohexol (OMNIPAQUE) 240 MG/ML solution 50 mL  50 mL Oral 90 min pre-procedure    lidocaine (LIDODERM) 5 % patch 2 patch  2 patch Topical Daily    magnesium citrate (CITROMA) oral solution 296 mL  296 mL Oral Daily PRN    melatonin tablet 6 mg  6 mg Oral HS PRN    methocarbamol (ROBAXIN) tablet 750 mg  750 mg Oral Q6H [facility]    methylnaltrexone (RELISTOR) subcutaneous injection 12 mg  12 mg Subcutaneous Q48H PRN    miconazole 2 % cream   Topical BID    mirtazapine (REMERON) tablet 30 mg  30 mg Oral HS    morphine injection 2 mg  2 mg Intravenous Q4H PRN    mupirocin (BACTROBAN) 2 % nasal ointment 1 application  1 application Nasal R[address]    nystatin (MYCOSTATIN) cream   Topical BID    OLANZapine (ZyPREXA) tablet 5 mg  5 mg Oral HS    ondansetron (ZOFRAN) injection 4 mg  4 mg Intravenous Q6H PRN    oxyCODONE (ROXICODONE) immediate release tablet 10 mg  10 mg Oral Q4H PRN    oxyCODONE (ROXICODONE) IR tablet 15 mg  15 mg Oral Q4H PRN    pantoprazole (PROTONIX) EC tablet 20 mg  20 mg Oral Early Morning    saccharomyces boulardii (FLORASTOR) capsule 250 mg  250 mg Oral BID    senna (SENOKOT) tablet 8 6 mg  1 tablet Oral BID    and PTA meds:   None       Allergies   Allergen Reactions    Cat Hair Extract     Dog Epithelium     Latex     Pollen Extract        Objective     Temp:  [97 8 °F (36 6 °C)-98 °F (36 7 °C)] 97 8 °F (36 6 °C)  HR:  [92] 92  Resp:  [17] 17  BP: (113-117)/(56-65) 117/56    Physical Exam  Vitals signs and nursing note reviewed  Constitutional:       General: She is awake  She is not in acute distress  Appearance: She is not toxic-appearing  Eyes:      Pupils: Pupils are equal, round, and reactive to light  Skin:     General: Skin is warm and dry  Neurological:      General: No focal deficit present  Mental Status: She is alert and oriented to person, place, and time  GCS: GCS eye subscore is 4  GCS verbal subscore is 5  GCS motor subscore is 6  Psychiatric:         Behavior: Behavior is cooperative           Lab Results:   Results from last 7 days   Lab Units 09/07/20  0610   WBC Thousand/uL 5 48   HEMOGLOBIN g/dL 9 2*   HEMATOCRIT % 29 9*   PLATELETS Thousands/uL 283      Results from last 7 days   Lab Units 09/07/20  0609   POTASSIUM mmol/L 4 3   CHLORIDE mmol/L 109*   CO2 mmol/L 28   BUN mg/dL 15   CREATININE mg/dL 0 38* CALCIUM mg/dL 8 2*   ALK PHOS U/L 192*   ALT U/L 39   AST U/L 73*       Imaging Studies: I have personally reviewed pertinent reports  EKG, Pathology, and Other Studies: I have personally reviewed pertinent reports  Counseling / Coordination of Care  Total floor / unit time spent today 20 minutes  Greater than 50% of total time was spent with the patient and / or family counseling and / or coordination of care  A description of the counseling / coordination of care:  Patient interview, physical examination, review of medical record, review of imaging and laboratory data, development of pain management plan, discussion of pain management plan with patient and primary service      Osvaldo Brown PA-C

## 2020-09-08 NOTE — PROGRESS NOTES
Progress Note - Brit Gaitan 1990, 34 y o  female MRN: [id number]    Unit/Bed#: [facility] 526-01 Encounter: [id number]    Primary Care Provider: Shruthi Mccray PA-C   Date and time admitted to hospital: 2020  7:39 PM        Rash  Assessment & Plan  · Bilateral buttocks, papular itchy rash in a linear fashion  · Less likely fungal, allergic vs monitor for shingles  · Will trial topical miconazole, if doesn't work try topical benadryl    Transaminitis  Assessment & Plan  Check acute hep panel - pt reports hx of Hep C  Positive for hep C antibodies  AST and ALP continue to be up and down    DVT of axillary vein, acute left [facility])  Assessment & Plan  Increase lovenox to 60mg Q12h  Due to severe pain consulted with vascular surgery for treatment options - appreciated  Picc continues to function well and post recent blood culture is negative  Elevation of LUE  PICC team attempted to move picc line to right arm given left arm DVT pain  Unsuccessful  IR consulted   IR exchanged PICC   Needs 3 months of AC - can d/c on NOAC if affordable    hold Lovenox for tooth extraction today    -will give Lovenox today; however discontinue after tonight's dose in preparation for CT surgery    Intravenous drug abuse [facility])  Assessment & Plan  · Patient with history of drug abuse and likely the source of bacteremia  · Patient has a history of signing against medical advise  · May benefit from drug rehab eventually if she becomes agreeable to host services  She was not interested during my conversation  · During the hospital stay in July of this year patient was positive for [facility] ,cocaine and opiates  Although pt previosuly reported to Dr Nicolas Blackman that she had not used any drugs for several years now, but admitted to Dr Rupa Hearn that she was actively abusing cocaine and heroin  · She affirm to previous team, after her boyfriend , that she doesn't want to use drugs & get high again      Right hip pain  Assessment & Plan  · Patient was complaining of severe back pain mainly in the lower part of the back and also in the sacral region  · Patient had MRI of the lumbar and thoracic spine during the recent hospital stay in [facility] which was unremarkable  · - continue Cymbalta 30 mg daily - increased to 60 mg daily on 9/1  · - continue robaxin to 750 mg q 6  · - continue oxy 10 mg and 15 mg  prn  · - continue iv morphine - decrease to 2q4 prn  · -continue tylenol prn - refused ATC  · - continue lidocaine patch if pt does not refuse  · -continue gabapentin to 100 TID - decrease to BID tomorrow  · add ibuprofen RTC and ppi for GI ppx  · Iliacus muscle abscess is noted on CT scan, continue antibiotic treatment as above, no indication for drainage at this time  · Repeat CT scan shows improvement in septic emboli to iliacus muscle  · ketamine infusion discontinued  · Patient has been reluctant with interventions and procedures  She cites that she wants IV pain meds before intervention  Extensive discussion regarding acute pain service recommendations  Will proceed with following recommendations by acute pain service  · MRI completed 8/30 shows sacroiliitis which ID recs against surgical intervention    ID recs weekly CRP and ESR, which are trending down   · Hold off on thoracolumbar MRI as pain in right hip and prior MRI unremarkable  · Opioid, benzo, and Neurontin taper scanned into media  · Neurontin taper already ordered  · Will have to follow taper as pt cannot be d/c on controlled substances and pain will need to be controlled w/ Cymbalta, Robaxin, ibuprofen, and tylenol  · 9/6: post tooth extraction, having lot of pain in tooth & hip, states 2 mg IV morphine doesn't help so had to give her one dose 3 mg    Bipolar 1 disorder ([facility] [address] )  Assessment & Plan  · Patient 8/27 agreed to psych eval which is apprecaited  · Psych added Zyprexa  · C/w Cymbalta and Remeron  · So far has been on p r n  IV Ativan  She states that it is not working for her  Getting panic attacks after passing away of her boyfriend about 3 days ago  · She discuss this was with Pain Service recommends changing her from IV Ativan to oral Xanax 1 mg q 4 hours p r n   Ordered that    Septic embolism ([address] )  Assessment & Plan  · Patient noted to have abnormalities seen in the CT of the chest abdomen and pelvis concerning for septic emboli  · There is pleural effusion on the CT scan of the chest and also on repeat chest x-ray  · Patient is rather asymptomatic from pulmonary standpoint  · Continue with the antibiotics  · Thoracic surgery have evaluated the pt and she is not short of breath   · 8/15-imaging right hip shows no osseous involvement; concern for proximity of abscess status SI joint, but no SI joint involvement at this time  · Management as above under hip pain  · c/o pleuritic CP  CXR shows no acute finding  Suspect pain could be related to septic emboli  · Chest CT performed preoperatively today shows resolution of right pleural effusion and improvement of several of the previous opacities with some new opacities which probably are post infectious/inflammatory resolving changes      Acute bacterial endocarditis  Assessment & Plan  · Patient noted to have tricuspid while vegetation on echocardiogram done in July  Patient had a transthoracic and also transesophageal echocardiogram done during the last hospital stay  · With septic emboli to lungs and posterior iliacus muscle  · Patient is transferred over here to be evaluated by CT surgery  · She was noncommittal to abstain from illegal drugs or unintended use of medications  · When she continues to abstain and completes iv abx treatment they would consider surgical correction - this will likely be as OP   · Continue IV cefazolin as above  · repeat echo - shows to severe TR, 14 x 8 mm mobile vegetation on tricuspid valve    No pericardial effusion, midly dilated RV (not mentioned before)  · Reconsulted CT surgery who recommend tricuspid valve replacement  Preoperative investigations being performed, OR next week  Patient understands that after this wall replacement if she ends up being back on drugs and gets it in the affected she will not get another surgery  · OMFS consulted for pre op clearance - s/p tooth extraction - 9/7  Having a lot of pain, had to give her an extra dose of 3mg IV morphine    9/8-as per cardiothoracic surgery; likely to perform tricuspid valve repair on 09/10  Patient has undergone tooth extraction and will hold Lovenox prior to surgery as per Cardiothoracic note  * Bacteremia due to Staphylococcus aureus  Assessment & Plan  · Recurrent MSSA bacteremia  · Initially admitted to [facility] from 07/15-blood culture were positive for MSSA bacteremia, but patient signed AMA on 07/22  Her repeat blood culture done on 7/21 was negative after 5 days  · [facility] on 07/30-blood culture came back positive for MSSA and she left again is medical advice on 08/04  Her blood cultures came back positive on 07/30, but the bacteremia cleared as of 8 /2  Patient left AMA on 08/04  · Readmitted to [address] [place] [address] on 08/10-blood culture came back positive for Staph aureus  · 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 during the last hospital stay from 7/15-7/22  · Plan is to continue IV cefazolin for a 6 week course per ID recommendations from 8/15 when B Cx became neg till 9/26  · 8/26 removed PICC and PICC cx neg  · B Cx from 8/26 neg      VTE Pharmacologic Prophylaxis:   Pharmacologic: Enoxaparin (Lovenox)  Mechanical VTE Prophylaxis in Place: No    Patient Centered Rounds: I have performed bedside rounds with nursing staff today      Discussions with Specialists or Other Care Team Provider:     Education and Discussions with Family / Patient: Care plan discussed with patient who voiced understanding and agrees with recommendations  Time Spent for Care: 30 minutes  More than 50% of total time spent on counseling and coordination of care as described above  Current Length of Stay: 27 day(s)    Current Patient Status: Inpatient   Certification Statement: The patient will continue to require additional inpatient hospital stay due to Treatment of endocarditis    Discharge Plan: To be determined    Code Status: Level 1 - Full Code      Subjective:   Patient seen examined bedside, no acute distress noted, however patient did complain of dental pain  Lovenox to be stopped tonight after last evening dose in preparation for 9/10 cardiothoracic surgery  Continue IV antibiotics until   Appreciate pain service recommendations, will continue weaning plan  Objective:     Vitals:   Temp (24hrs), Av 9 °F (36 6 °C), Min:97 8 °F (36 6 °C), Max:98 °F (36 7 °C)    Temp:  [97 8 °F (36 6 °C)-98 °F (36 7 °C)] 97 8 °F (36 6 °C)  HR:  [92] 92  Resp:  [17] 17  BP: (113-117)/(56-65) 117/56  SpO2:  [97 %-98 %] 98 %  Body mass index is 23 3 kg/m²  Input and Output Summary (last 24 hours): Intake/Output Summary (Last 24 hours) at 2020 1332  Last data filed at 2020 1300  Gross per 24 hour   Intake 393 ml   Output    Net 393 ml       Physical Exam:     Physical Exam  Vitals signs and nursing note reviewed  HENT:      Head: Normocephalic and atraumatic  Right Ear: External ear normal       Left Ear: External ear normal       Nose: Nose normal       Mouth/Throat:      Mouth: Mucous membranes are moist    Eyes:      Extraocular Movements: Extraocular movements intact  Conjunctiva/sclera: Conjunctivae normal       Pupils: Pupils are equal, round, and reactive to light  Neck:      Musculoskeletal: Normal range of motion and neck supple  Cardiovascular:      Rate and Rhythm: Tachycardia present  Musculoskeletal:         General: No tenderness        Comments: Pain and right hip with reduced range of motion   Skin:     General: Skin is warm and dry  Neurological:      Mental Status: She is alert and oriented to person, place, and time  Psychiatric:         Behavior: Behavior normal       Comments: Flat affect           Additional Data:     Labs:    Results from last 7 days   Lab Units 09/07/20  0610   WBC Thousand/uL 5 48   HEMOGLOBIN g/dL 9 2*   HEMATOCRIT % 29 9*   PLATELETS Thousands/uL 283   NEUTROS PCT % 32*   LYMPHS PCT % 42   MONOS PCT % 7   EOS PCT % 17*     Results from last 7 days   Lab Units 09/07/20  0609   SODIUM mmol/L 140   POTASSIUM mmol/L 4 3   CHLORIDE mmol/L 109*   CO2 mmol/L 28   BUN mg/dL 15   CREATININE mg/dL 0 38*   ANION GAP mmol/L 3*   CALCIUM mg/dL 8 2*   ALBUMIN g/dL 2 6*   TOTAL BILIRUBIN mg/dL 0 25   ALK PHOS U/L 192*   ALT U/L 39   AST U/L 73*   GLUCOSE RANDOM mg/dL 90                           * I Have Reviewed All Lab Data Listed Above  * Additional Pertinent Lab Tests Reviewed:  All Labs Within Last 24 Hours Reviewed    Imaging:    Imaging Reports Reviewed Today Include:   Imaging Personally Reviewed by Myself Includes:      Recent Cultures (last 7 days):           Last 24 Hours Medication List:   Current Facility-Administered Medications   Medication Dose Route Frequency Provider Last Rate    acetaminophen  650 mg Oral Q6H PRN Gin Ledesma MD      ALPRAZolam  1 mg Oral Q4H PRN Kalee Palma MD      alteplase  2 mg Intracatheter Q12H PRN Kalee Palma MD      calcium acetate  667 mg Oral TID With Meals Kalee Palma MD      calcium carbonate  1,000 mg Oral Daily PRN Kalee Palma MD      cefazolin  2,000 mg Intravenous Q8H Gin Ledesma MD 2,000 mg (09/08/20 0529)    chlorhexidine  15 mL Swish & Spit Q12H [facility] Kalee Palma MD      dicyclomine  10 mg Oral TID PRN Kalee Palma MD      DULoxetine  60 mg Oral Daily Kalee Palma MD      enoxaparin  1 mg/kg Subcutaneous Q12H [facility] Karel Nissen, PA-C      gabapentin 100 mg Oral BID Geronimo Chowdhury MD      Followed by   Addison Pacheco ON 9/9/2020] gabapentin  100 mg Oral HS Geronimo Chowdhury MD      hydrocortisone   Topical 4x Daily PRN Geronimo Chowdhury MD      ibuprofen  600 mg Oral Q8H [address] Gin Ledesma MD      iohexol  50 mL Oral 90 min pre-procedure Geronimo Chowdhury MD      lidocaine  2 patch Topical Daily Geronimo Chowdhury MD      magnesium citrate  296 mL Oral Daily PRN Geronimo Chowdhury MD      melatonin  6 mg Oral HS PRN Geronimo Chowdhury MD      methocarbamol  750 mg Oral Q6H [address] Gin Ledesma MD      methylnaltrexone bromide  12 mg Subcutaneous Q48H PRN Geronimo Chowdhury MD      miconazole   Topical BID Geronimo Chowdhury MD      mirtazapine  30 mg Oral HS Gin Ledesma MD      morphine injection  2 mg Intravenous Q4H PRN Geronimo Chowdhury MD      mupirocin  1 application Nasal Z28C [address] Geronimo Chowdhury MD      nystatin   Topical BID Gin Ledesma MD      OLANZapine  5 mg Oral HS Gin Ledesma MD      ondansetron  4 mg Intravenous Q6H PRN Geronimo Chowdhury MD      oxyCODONE  10 mg Oral Q4H PRN Geronimo Chowdhury MD      oxyCODONE  15 mg Oral Q4H PRN Geronimo Chowdhury MD      pantoprazole  20 mg Oral Early Morning Gin Ledesma MD      saccharomyces boulardii  250 mg Oral BID Geronimo Chowdhury MD      senna  1 tablet Oral BID Geronimo Chowdhury MD          Today, Patient Was Seen By: Rebekah Amezcua MD    ** Please Note: Dictation voice to text software may have been used in the creation of this document   **

## 2020-09-09 ENCOUNTER — ANESTHESIA EVENT (INPATIENT)
Dept: PERIOP | Facility: HOSPITAL | Age: 30
DRG: 163 | End: 2020-09-09
Payer: COMMERCIAL

## 2020-09-09 PROBLEM — E83.39 HYPERPHOSPHATEMIA: Status: ACTIVE | Noted: 2020-09-09

## 2020-09-09 LAB
25(OH)D3 SERPL-MCNC: 15.4 NG/ML (ref 30–100)
ABO GROUP BLD: NORMAL
ALBUMIN SERPL BCP-MCNC: 3 G/DL (ref 3.5–5)
ALP SERPL-CCNC: 189 U/L (ref 46–116)
ALT SERPL W P-5'-P-CCNC: 30 U/L (ref 12–78)
ANION GAP SERPL CALCULATED.3IONS-SCNC: 6 MMOL/L (ref 4–13)
AST SERPL W P-5'-P-CCNC: 46 U/L (ref 5–45)
BASOPHILS # BLD AUTO: 0.02 THOUSANDS/ΜL (ref 0–0.1)
BASOPHILS NFR BLD AUTO: 0 % (ref 0–1)
BILIRUB SERPL-MCNC: 0.22 MG/DL (ref 0.2–1)
BLD GP AB SCN SERPL QL: NEGATIVE
BUN SERPL-MCNC: 15 MG/DL (ref 5–25)
CALCIUM SERPL-MCNC: 9.2 MG/DL (ref 8.3–10.1)
CHLORIDE SERPL-SCNC: 105 MMOL/L (ref 100–108)
CHOLEST SERPL-MCNC: 122 MG/DL (ref 50–200)
CK SERPL-CCNC: 27 U/L (ref 26–192)
CO2 SERPL-SCNC: 29 MMOL/L (ref 21–32)
CREAT SERPL-MCNC: 0.4 MG/DL (ref 0.6–1.3)
EOSINOPHIL # BLD AUTO: 0.83 THOUSAND/ΜL (ref 0–0.61)
EOSINOPHIL NFR BLD AUTO: 14 % (ref 0–6)
ERYTHROCYTE [DISTWIDTH] IN BLOOD BY AUTOMATED COUNT: 14.6 % (ref 11.6–15.1)
GFR SERPL CREATININE-BSD FRML MDRD: 143 ML/MIN/1.73SQ M
GLUCOSE SERPL-MCNC: 90 MG/DL (ref 65–140)
HCT VFR BLD AUTO: 33.7 % (ref 34.8–46.1)
HDLC SERPL-MCNC: 45 MG/DL
HGB BLD-MCNC: 10.4 G/DL (ref 11.5–15.4)
IMM GRANULOCYTES # BLD AUTO: 0.02 THOUSAND/UL (ref 0–0.2)
IMM GRANULOCYTES NFR BLD AUTO: 0 % (ref 0–2)
LDLC SERPL CALC-MCNC: 24 MG/DL (ref 0–100)
LYMPHOCYTES # BLD AUTO: 2.09 THOUSANDS/ΜL (ref 0.6–4.47)
LYMPHOCYTES NFR BLD AUTO: 36 % (ref 14–44)
MAGNESIUM SERPL-MCNC: 2.2 MG/DL (ref 1.6–2.6)
MCH RBC QN AUTO: 26.9 PG (ref 26.8–34.3)
MCHC RBC AUTO-ENTMCNC: 30.9 G/DL (ref 31.4–37.4)
MCV RBC AUTO: 87 FL (ref 82–98)
MONOCYTES # BLD AUTO: 0.45 THOUSAND/ΜL (ref 0.17–1.22)
MONOCYTES NFR BLD AUTO: 8 % (ref 4–12)
NEUTROPHILS # BLD AUTO: 2.43 THOUSANDS/ΜL (ref 1.85–7.62)
NEUTS SEG NFR BLD AUTO: 42 % (ref 43–75)
NONHDLC SERPL-MCNC: 77 MG/DL
NRBC BLD AUTO-RTO: 0 /100 WBCS
PHOSPHATE SERPL-MCNC: 6.4 MG/DL (ref 2.7–4.5)
PLATELET # BLD AUTO: 304 THOUSANDS/UL (ref 149–390)
PMV BLD AUTO: 8.8 FL (ref 8.9–12.7)
POTASSIUM SERPL-SCNC: 4 MMOL/L (ref 3.5–5.3)
PROT SERPL-MCNC: 7.2 G/DL (ref 6.4–8.2)
PTH-INTACT SERPL-MCNC: 16.5 PG/ML (ref 18.4–80.1)
RBC # BLD AUTO: 3.86 MILLION/UL (ref 3.81–5.12)
RH BLD: NEGATIVE
SODIUM SERPL-SCNC: 140 MMOL/L (ref 136–145)
SPECIMEN EXPIRATION DATE: NORMAL
TRIGL SERPL-MCNC: 266 MG/DL
WBC # BLD AUTO: 5.84 THOUSAND/UL (ref 4.31–10.16)

## 2020-09-09 PROCEDURE — 84165 PROTEIN E-PHORESIS SERUM: CPT | Performed by: PATHOLOGY

## 2020-09-09 PROCEDURE — 99253 IP/OBS CNSLTJ NEW/EST LOW 45: CPT | Performed by: INTERNAL MEDICINE

## 2020-09-09 PROCEDURE — 85025 COMPLETE CBC W/AUTO DIFF WBC: CPT | Performed by: INTERNAL MEDICINE

## 2020-09-09 PROCEDURE — 80061 LIPID PANEL: CPT | Performed by: INTERNAL MEDICINE

## 2020-09-09 PROCEDURE — 86920 COMPATIBILITY TEST SPIN: CPT

## 2020-09-09 PROCEDURE — 82306 VITAMIN D 25 HYDROXY: CPT | Performed by: INTERNAL MEDICINE

## 2020-09-09 PROCEDURE — 83970 ASSAY OF PARATHORMONE: CPT | Performed by: INTERNAL MEDICINE

## 2020-09-09 PROCEDURE — 84100 ASSAY OF PHOSPHORUS: CPT | Performed by: INTERNAL MEDICINE

## 2020-09-09 PROCEDURE — 82550 ASSAY OF CK (CPK): CPT | Performed by: INTERNAL MEDICINE

## 2020-09-09 PROCEDURE — 80053 COMPREHEN METABOLIC PANEL: CPT | Performed by: INTERNAL MEDICINE

## 2020-09-09 PROCEDURE — 86850 RBC ANTIBODY SCREEN: CPT | Performed by: HOSPITALIST

## 2020-09-09 PROCEDURE — 99233 SBSQ HOSP IP/OBS HIGH 50: CPT | Performed by: INTERNAL MEDICINE

## 2020-09-09 PROCEDURE — 99232 SBSQ HOSP IP/OBS MODERATE 35: CPT | Performed by: THORACIC SURGERY (CARDIOTHORACIC VASCULAR SURGERY)

## 2020-09-09 PROCEDURE — 86900 BLOOD TYPING SEROLOGIC ABO: CPT | Performed by: HOSPITALIST

## 2020-09-09 PROCEDURE — 84165 PROTEIN E-PHORESIS SERUM: CPT | Performed by: INTERNAL MEDICINE

## 2020-09-09 PROCEDURE — 94760 N-INVAS EAR/PLS OXIMETRY 1: CPT

## 2020-09-09 PROCEDURE — 99232 SBSQ HOSP IP/OBS MODERATE 35: CPT | Performed by: INTERNAL MEDICINE

## 2020-09-09 PROCEDURE — 83735 ASSAY OF MAGNESIUM: CPT | Performed by: INTERNAL MEDICINE

## 2020-09-09 PROCEDURE — 86901 BLOOD TYPING SEROLOGIC RH(D): CPT | Performed by: HOSPITALIST

## 2020-09-09 RX ORDER — CHLORHEXIDINE GLUCONATE 0.12 MG/ML
15 RINSE ORAL EVERY 12 HOURS SCHEDULED
Status: CANCELLED | OUTPATIENT
Start: 2020-09-09

## 2020-09-09 RX ORDER — GABAPENTIN 100 MG/1
100 CAPSULE ORAL
Status: COMPLETED | OUTPATIENT
Start: 2020-09-09 | End: 2020-09-13

## 2020-09-09 RX ORDER — OLANZAPINE 5 MG/1
5 TABLET ORAL
Status: DISCONTINUED | OUTPATIENT
Start: 2020-09-09 | End: 2020-09-10 | Stop reason: HOSPADM

## 2020-09-09 RX ORDER — HEPARIN SODIUM 1000 [USP'U]/ML
10000 INJECTION, SOLUTION INTRAVENOUS; SUBCUTANEOUS ONCE
Status: CANCELLED | OUTPATIENT
Start: 2020-09-10

## 2020-09-09 RX ORDER — HEPARIN SODIUM 1000 [USP'U]/ML
400 INJECTION, SOLUTION INTRAVENOUS; SUBCUTANEOUS ONCE
Status: CANCELLED | OUTPATIENT
Start: 2020-09-10

## 2020-09-09 RX ORDER — IBUPROFEN 600 MG/1
600 TABLET ORAL EVERY 8 HOURS SCHEDULED
Status: DISCONTINUED | OUTPATIENT
Start: 2020-09-09 | End: 2020-09-10 | Stop reason: HOSPADM

## 2020-09-09 RX ORDER — KETOROLAC TROMETHAMINE 30 MG/ML
15 INJECTION, SOLUTION INTRAMUSCULAR; INTRAVENOUS EVERY 6 HOURS PRN
Status: DISCONTINUED | OUTPATIENT
Start: 2020-09-09 | End: 2020-09-10 | Stop reason: HOSPADM

## 2020-09-09 RX ORDER — MIRTAZAPINE 30 MG/1
30 TABLET, FILM COATED ORAL
Status: DISCONTINUED | OUTPATIENT
Start: 2020-09-09 | End: 2020-09-10 | Stop reason: HOSPADM

## 2020-09-09 RX ORDER — METHOCARBAMOL 750 MG/1
750 TABLET, FILM COATED ORAL EVERY 6 HOURS PRN
Status: DISCONTINUED | OUTPATIENT
Start: 2020-09-09 | End: 2020-09-10 | Stop reason: HOSPADM

## 2020-09-09 RX ADMIN — GABAPENTIN 100 MG: 100 CAPSULE ORAL at 20:34

## 2020-09-09 RX ADMIN — CEFAZOLIN SODIUM 2000 MG: 2 SOLUTION INTRAVENOUS at 20:31

## 2020-09-09 RX ADMIN — Medication 250 MG: at 09:57

## 2020-09-09 RX ADMIN — METHOCARBAMOL TABLETS 750 MG: 750 TABLET, COATED ORAL at 05:18

## 2020-09-09 RX ADMIN — Medication 250 MG: at 17:25

## 2020-09-09 RX ADMIN — OXYCODONE HYDROCHLORIDE 15 MG: 10 TABLET ORAL at 14:14

## 2020-09-09 RX ADMIN — KETOROLAC TROMETHAMINE 15 MG: 30 INJECTION, SOLUTION INTRAMUSCULAR at 23:30

## 2020-09-09 RX ADMIN — CALCIUM ACETATE 667 MG: 667 CAPSULE ORAL at 17:25

## 2020-09-09 RX ADMIN — CEFAZOLIN SODIUM 2000 MG: 2 SOLUTION INTRAVENOUS at 12:30

## 2020-09-09 RX ADMIN — KETOROLAC TROMETHAMINE 15 MG: 30 INJECTION, SOLUTION INTRAMUSCULAR at 17:26

## 2020-09-09 RX ADMIN — MELATONIN 6 MG: at 23:30

## 2020-09-09 RX ADMIN — MORPHINE SULFATE 2 MG: 2 INJECTION, SOLUTION INTRAMUSCULAR; INTRAVENOUS at 12:15

## 2020-09-09 RX ADMIN — IBUPROFEN 600 MG: 600 TABLET, FILM COATED ORAL at 20:31

## 2020-09-09 RX ADMIN — CEFAZOLIN SODIUM 2000 MG: 2 SOLUTION INTRAVENOUS at 05:19

## 2020-09-09 RX ADMIN — ALPRAZOLAM 1 MG: 0.5 TABLET ORAL at 23:30

## 2020-09-09 RX ADMIN — MORPHINE SULFATE 2 MG: 2 INJECTION, SOLUTION INTRAMUSCULAR; INTRAVENOUS at 20:31

## 2020-09-09 RX ADMIN — MICONAZOLE NITRATE: 2 CREAM TOPICAL at 10:00

## 2020-09-09 RX ADMIN — OXYCODONE HYDROCHLORIDE 15 MG: 10 TABLET ORAL at 05:18

## 2020-09-09 RX ADMIN — IBUPROFEN 600 MG: 600 TABLET ORAL at 13:42

## 2020-09-09 RX ADMIN — SENNOSIDES 8.6 MG: 8.6 TABLET ORAL at 17:25

## 2020-09-09 RX ADMIN — DULOXETINE HYDROCHLORIDE 60 MG: 60 CAPSULE, DELAYED RELEASE ORAL at 10:00

## 2020-09-09 RX ADMIN — CALCIUM ACETATE 667 MG: 667 CAPSULE ORAL at 12:12

## 2020-09-09 RX ADMIN — CHLORHEXIDINE GLUCONATE 0.12% ORAL RINSE 15 ML: 1.2 LIQUID ORAL at 20:31

## 2020-09-09 RX ADMIN — OLANZAPINE 5 MG: 5 TABLET, FILM COATED ORAL at 20:34

## 2020-09-09 RX ADMIN — OXYCODONE HYDROCHLORIDE 15 MG: 10 TABLET ORAL at 18:20

## 2020-09-09 RX ADMIN — CALCIUM ACETATE 667 MG: 667 CAPSULE ORAL at 09:57

## 2020-09-09 RX ADMIN — MIRTAZAPINE 30 MG: 30 TABLET, FILM COATED ORAL at 20:31

## 2020-09-09 RX ADMIN — MUPIROCIN 1 APPLICATION: 20 OINTMENT TOPICAL at 09:57

## 2020-09-09 RX ADMIN — PANTOPRAZOLE SODIUM 20 MG: 20 TABLET, DELAYED RELEASE ORAL at 05:19

## 2020-09-09 RX ADMIN — SENNOSIDES 8.6 MG: 8.6 TABLET ORAL at 09:57

## 2020-09-09 RX ADMIN — MICONAZOLE NITRATE: 2 CREAM TOPICAL at 20:46

## 2020-09-09 RX ADMIN — KETOROLAC TROMETHAMINE 15 MG: 30 INJECTION, SOLUTION INTRAMUSCULAR at 10:05

## 2020-09-09 RX ADMIN — METHOCARBAMOL TABLETS 750 MG: 750 TABLET, COATED ORAL at 12:12

## 2020-09-09 RX ADMIN — IBUPROFEN 600 MG: 600 TABLET ORAL at 05:19

## 2020-09-09 RX ADMIN — OXYCODONE HYDROCHLORIDE 15 MG: 10 TABLET ORAL at 09:58

## 2020-09-09 RX ADMIN — MUPIROCIN 1 APPLICATION: 20 OINTMENT TOPICAL at 20:31

## 2020-09-09 RX ADMIN — CHLORHEXIDINE GLUCONATE 0.12% ORAL RINSE 15 ML: 1.2 LIQUID ORAL at 09:57

## 2020-09-09 NOTE — NURSING NOTE
Patient is refusing to take robaxin and have type and screen blood qork done at this time  Patient states, "this is interfering with my sleep"  Will try at later time to get type and screen blood work

## 2020-09-09 NOTE — RESPIRATORY THERAPY NOTE
RT Protocol Note  Claude Hernandez 34 y o  female MRN: [id number]  Unit/Bed#: [facility] 526-01 Encounter: [id number]    Assessment    Principal Problem:    Bacteremia due to Staphylococcus aureus  Active Problems:    Acute bacterial endocarditis    Septic embolism (HCC)    Bipolar 1 disorder (HCC)    Right hip pain    Intravenous drug abuse ([address] )    DVT of axillary vein, acute left (HCC)    Transaminitis    Rash    Anemia    Hyperphosphatemia      Home Pulmonary Medications:         Past Medical History:   Diagnosis Date    Abnormal Pap smear of cervix     Anxiety     Depression     Endocarditis     2018    Hepatitis C     HPV (human papilloma virus) anogenital infection      Social History     Socioeconomic History    Marital status: Single     Spouse name: None    Number of children: None    Years of education: None    Highest education level: None   Occupational History    None   Social Needs    Financial resource strain: None    Food insecurity     Worry: None     Inability: None    Transportation needs     Medical: None     Non-medical: None   Tobacco Use    Smoking status: Former Smoker     Packs/day: 0 25     Types: Cigarettes     Last attempt to quit: 11/9/2016     Years since quitting: 3 8    Smokeless tobacco: Never Used    Tobacco comment: current everyday smoker per [employer]   Substance and Sexual Activity    Alcohol use: Not Currently     Alcohol/week: 0 0 standard drinks    Drug use: Yes     Types: Heroin     Comment: last use - one week ago    Sexual activity: Yes   Lifestyle    Physical activity     Days per week: None     Minutes per session: None    Stress: None   Relationships    Social connections     Talks on phone: None     Gets together: None     Attends [employer] service: None     Active member of club or organization: None     Attends meetings of clubs or organizations: None     Relationship status: None    Intimate partner violence     Fear of current or ex partner: None     Emotionally abused: None     Physically abused: None     Forced sexual activity: None   Other Topics Concern    None   Social History Narrative    · Exercise level:   None      · Diet:   Regular      · General stress level:   High      · Caffeine intake:   Occasional      · Seat belts used routinely:   Yes        Subjective         Objective    Physical Exam:   Assessment Type: Assess only    Vitals:  Blood pressure 133/85, pulse 90, temperature 98 1 °F (36 7 °C), temperature source Oral, resp  rate 20, height 5' 5" (1 651 m), weight 63 5 kg (139 lb 15 9 oz), SpO2 98 %, not currently breastfeeding  Imaging and other studies: I have personally reviewed pertinent reports  Plan    Respiratory Plan: (P) No distress/Pulmonary history  Airway Clearance Plan: Incentive Spirometer, Discontinue Protocol     Resp Comments: Pt  for Open heart surgery in am  BS decreased clear  IS given and instructed  Done well  Encouraged to use Q1wa

## 2020-09-09 NOTE — NURSING NOTE
Patient was screaming out and crying about being in so much pain in her right hip  She stated that the only thing that works is Toradol  Toradol was only order for a one time dose yesterday  oxy, robaxin, motrin was given to her  TT CATHY Wong about wanting Toradol and Per order to give prescribed medications first and then reassess patient and TT SLIM PA back with any changes  Patient agreed to this after huffing

## 2020-09-09 NOTE — ASSESSMENT & PLAN NOTE
Patient started on PhosLo t i d  By previous team; however phosphorus continuing to climb; 6 4 on morning labs  Unclear etiology at this time as patient with good GFR, not in metabolic respiratory acidosis, calcium levels within normal limits  Consult to Nephrology    Ensure low phos diet ordered

## 2020-09-09 NOTE — PROGRESS NOTES
Progress Note - Infectious Disease   Lj Mendoza 34 y o  female MRN: [id number]  Unit/Bed#: [facility] 526-01 Encounter: [id number]      Impression/Plan:    1  Recurrent/persistent MSSA bacteremia with TV infective endocarditis:  Prolonged course of IV antibiotic was previously recommended, but patient has left AMA on 2 prior occasions (once at [facility] and once from [facility])    She remains hemodynamically stable   Repeat blood culture negative on 08/15/2020     -continue cefazolin at least through 9/26/2020 to complete 6 weeks from the 1st negative blood culture  -patient is planned for tricuspid valve surgery on 09/10/2020   may need to extend the course of the antibiotics if operative cultures are positive  -recheck CBC with diff and CMP weekly while on the IV antibiotics     2  Tricuspid valve endocarditis, 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:  MELISSA done 07/21/2020 showed large vegetation on TV with severe regurgitation  7/15 CT abdomen/pelvis also showed bilateral renal parenchymal abnormalities concerning for septic emboli, patient also had right-sided loculated pleural effusion   No intra-abdominal abscess   Repeat CT chest 09/04/2020 shows resolution of right-sided pleural effusion  -antibiotic plan as above  -patient to go to the OR tomorrow 09/10/2020 for valve surgery; will follow up operative finding and operative culture once collected and planned duration of antibiotics accordingly  -close CT surgery follow-up     3  Right iliacus muscle abscesses/sacroiliitis:  CT scan of right lower extremity on 08/21/2020 showed 9 mm iliacus muscle collection, decreasing in size    Follow-up imaging with MRI done 08/31/2020 with evidence of sacroiliitis that is probably septic   There are no destructive changes or fluid collection   Suspect this is the cause of the patient's ongoing pain   Inflammatory markers continue to decrease, ESR now normal, CRP down to 5 (32>14>5)  -antibiotics as above  -recheck sedimentation rate and CRP weekly while on the IV antibiotics;  -pain management  -no indication for any surgical intervention for now        4  Back pain:   Slightly improving, more likely secondary to chronic pain, opioid dependence   8/1/20 Thoracic and lumbar spine MRIs performed at [facility] negative were for abscess or osteomyelitis   CT scan nondiagnostic for osteomyelitis or diskitis   Sedimentation rate and CRP continue to decrease as mentioned above    -monitor back pain   -serial exams  -acute pain service follow-up  -recheck CRP and sedimentation rate weekly while on IV antibiotics     5  Active IV heroin abuse:   This is the causative factor for development of bacteremia and infective endocarditis   Patient has left AMA on prior occasions   HIV screen negative  -patient is not a candidate for at home PICC line/IV antibiotics  -acute pain service follow-up     6  Chronic HCV infection:   Recent HIV was negative   The LFTs are waxing waning  Review of Care everywhere shows high hepatitis C viral load 01/2020   -monitor LFTs   - outpatient follow-up with GI     7  Left upper extremity DVT-in the setting of PICC line use   Patient now on anticoagulation  -vascular follow-up  -serial left upper extremity exam; if symptoms of increased pain, swelling, might consider need to remove left upper extremity PICC line  -anticoagulation     8- mild eosinophilia:  Patient denies itching, rash or other potential side effects from antibiotic use  Creatinine is stable  - continue cefazolin  - follow-up CBC with differential    Discussed the above management plan with the primary service  Plan mentioned above discussed with the patient     Antibiotics:  Cefazolin restart 31  Negative blood cultures 23      Subjective:  Patient has no fever, chills, sweats; no nausea, vomiting, diarrhea; no cough, shortness of breath;  No new symptoms; reports improvement in her low back and right hip pain    Objective:  Vitals:  Temp:  [98 1 °F (36 7 °C)] 98 1 °F (36 7 °C)  HR:  [92] 92  Resp:  [20] 20  BP: (133)/(70) 133/70  SpO2:  [98 %] 98 %  Temp (24hrs), Av 1 °F (36 7 °C), Min:98 1 °F (36 7 °C), Max:98 1 °F (36 7 °C)  Current: Temperature: 98 1 °F (36 7 °C)    Physical Exam:   General Appearance:  Alert, interactive, nontoxic, no acute distress  Throat: Oropharynx moist without lesions  Lungs:   Clear to auscultation bilaterally; no wheezes, rhonchi or rales; respirations unlabored   Heart:  RRR; no murmur, rub or gallop   Abdomen:   Soft, non-tender, non-distended, positive bowel sounds  Extremities: No clubbing, cyanosis or edema   Skin: No new rashes or lesions  No draining wounds noted  Labs, Imaging, & Other studies:   All pertinent labs and imaging studies were personally reviewed  Results from last 7 days   Lab Units 20  0520  0610 20  0550   WBC Thousand/uL 5 84 5 48 5 98   HEMOGLOBIN g/dL 10 4* 9 2* 9 5*   PLATELETS Thousands/uL 304 283 273     Results from last 7 days   Lab Units 20  0520  0609  20  0550   SODIUM mmol/L 140 140  --  140   POTASSIUM mmol/L 4 0 4 3  --  3 7   CHLORIDE mmol/L 105 109*  --  107   CO2 mmol/L 29 28  --  28   BUN mg/dL 15 15  --  17   CREATININE mg/dL 0 40* 0 38*  --  0 45*   EGFR ml/min/1 73sq m 143 146  --  138   CALCIUM mg/dL 9 2 8 2*  --  8 5   AST U/L 46* 73*  --   --    ALT U/L 30 39   < >  --    ALK PHOS U/L 189* 192*   < >  --     < > = values in this interval not displayed       Results from last 7 days   Lab Units 20  1050   MRSA CULTURE ONLY  No Methicillin Resistant Stapylococcus aureus (MRSA) after 24 hours         Results from last 7 days   Lab Units 20  0609   CRP mg/L 5 8*

## 2020-09-09 NOTE — ASSESSMENT & PLAN NOTE
· Patient was complaining of severe back pain mainly in the lower part of the back and also in the sacral region  · Patient had MRI of the lumbar and thoracic spine during the recent hospital stay in [facility] which was unremarkable  · - continue Cymbalta 30 mg daily - increased to 60 mg daily on 9/1  · - continue robaxin to 750 mg q 6  · - continue oxy 10 mg and 15 mg  prn  · - continue iv morphine - decrease to 2q4 prn  · -continue tylenol prn - refused ATC  · - continue lidocaine patch if pt does not refuse  · -continue gabapentin to 100 TID - decrease to BID tomorrow  · add ibuprofen RTC and ppi for GI ppx  · Iliacus muscle abscess is noted on CT scan, continue antibiotic treatment as above, no indication for drainage at this time  · Repeat CT scan shows improvement in septic emboli to iliacus muscle  · ketamine infusion discontinued  · Patient has been reluctant with interventions and procedures  She cites that she wants IV pain meds before intervention  Extensive discussion regarding acute pain service recommendations  Will proceed with following recommendations by acute pain service  · MRI completed 8/30 shows sacroiliitis which ID recs against surgical intervention  ID recs weekly CRP and ESR, which are trending down   · Hold off on thoracolumbar MRI as pain in right hip and prior MRI unremarkable  · Opioid, benzo, and Neurontin taper scanned into media  · Neurontin taper already ordered  · Will have to follow taper as pt cannot be d/c on controlled substances and pain will need to be controlled w/ Cymbalta, Robaxin, ibuprofen, and tylenol  · 9/6: post tooth extraction, having lot of pain in tooth & hip, states 2 mg IV morphine doesn't help so had to give her one dose 3 mg    9/9-discussed with acute pain service; patient reports good relief with Toradol; 15 mg q 6 p r n

## 2020-09-09 NOTE — CONSULTS
Consultation - Nephrology   Yuly Beard Hilda 34 y o  female MRN: [id number]  Unit/Bed#: [facility] 526-01 Encounter: [id number]    ASSESSMENT and PLAN:  1  Hyperphosphatemia-phos 6 4 despite PhosLo  Patient admits to nonadherence with low phos diet  -will place on low phosphorus diet  -follow-up PTH, lipid panel, 25 hydroxy vitamin-D levels  Will also check CK level  -will check TSH level  Last level 0 512 as of 7/15/20  -as patient has constipation, would avoid use of phosphate containing enemas/avoid fleet enemas  -f/u am phos, am CMP  -high normal calcium noted at 10 when corrected for albumin  -may benefit from endocrinology consultation if ongoing and PTH/vitamin D abnormalities noted  -of note, normal renal function    2  Anemia - Hgb 10 4, check iron panel, myeloma work up, monitor CBC  No FREDDIE in light of acute left DVT  3  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 - MELISSA pending, on IV ABx per ID/primary team, CT surgery on board  4  Bipolar 1 disorder - management per psych    HISTORY OF PRESENT ILLNESS:  Requesting Physician: Cirilo Brooks MD  Reason for Consult: hyperphosphatemia    Pablo Mendez is a 34y o  year old female who was admitted to [facility] after presenting with back pain  A renal consultation is requested today for assistance in the management of hyperphosphatemia  Patient states she initially presented with right back pain and was found to have UTI  She mentioned she has a history of hepatitis C as well as heroin use IV  She did have fevers on admission  She was found to have endocarditis  She is for cardiac surgery tomorrow  She did have nausea after eating 3 bags of nuts  She denies any vomiting or diarrhea does complain of constipation  She denies chest pain, shortness of breath or edema  Last bowel movement was 4-5 days ago  She denies any vitamin-D or calcium supplementation    She tells me that her mother has a history of hyperthyroidism  She complains personally of frequent UTIs  She states that she has a heroin addict in recovery but that she recently relapsed  PAST MEDICAL HISTORY:  Past Medical History:   Diagnosis Date    Abnormal Pap smear of cervix     Anxiety     Depression     Endocarditis     2018    Hepatitis C     HPV (human papilloma virus) anogenital infection        PAST SURGICAL HISTORY:  Past Surgical History:   Procedure Laterality Date    IR PICC LINE PLACEMENT DOUBLE LUMEN  8/26/2020    KNEE SURGERY Left     MOUTH SURGERY      TOOTH EXTRACTION N/A 9/7/2020    Procedure: EXTRACTION TOOTH 32;  Surgeon: Georgette Cole DMD;  Location: [facility];  Service: Maxillofacial       ALLERGIES:  Allergies   Allergen Reactions    Cat Hair Extract     Dog Epithelium     Latex     Pollen Extract        SOCIAL HISTORY:  Social History     Substance and Sexual Activity   Alcohol Use Not Currently    Alcohol/week: 0 0 standard drinks     Social History     Substance and Sexual Activity   Drug Use Yes    Types: Heroin    Comment: last use - one week ago     Social History     Tobacco Use   Smoking Status Former Smoker    Packs/day: 0 25    Types: Cigarettes    Last attempt to quit: 11/9/2016    Years since quitting: 3 8   Smokeless Tobacco Never Used   Tobacco Comment    current everyday smoker per [place]       FAMILY HISTORY:  History reviewed  No pertinent family history      MEDICATIONS:    Current Facility-Administered Medications:     acetaminophen (TYLENOL) tablet 650 mg, 650 mg, Oral, Q6H PRN, Gin Ledesma MD, 650 mg at 09/08/20 1729    ALPRAZolam (XANAX) tablet 1 mg, 1 mg, Oral, Q4H PRN, Maryuri Basurto MD, 1 mg at 09/08/20 2051    alteplase (CATHFLO) injection 2 mg, 2 mg, Intracatheter, Q12H PRN, Maryuri Basurto MD, 2 mg at 08/29/20 1522    calcium acetate (PHOSLO) capsule 667 mg, 667 mg, Oral, TID With Meals, Maryuri Basurto MD, 667 mg at 09/09/20 1212    calcium carbonate (TUMS) chewable tablet 1,000 mg, 1,000 mg, Oral, Daily PRN, Jaimie Cleaning MD, 1,000 mg at 20 2149    ceFAZolin (ANCEF) IVPB (premix) 2,000 mg 50 mL, 2,000 mg, Intravenous, Q8H, Jaimie Cleaning MD, Stopped at 20 1336    chlorhexidine (PERIDEX) 0 12 % oral rinse 15 mL, 15 mL, Swish & Spit, Q12H [address], Jaimie Cleaning MD, 15 mL at 20 0957    dicyclomine (BENTYL) capsule 10 mg, 10 mg, Oral, TID PRN, Jaimie Cleaning MD, 10 mg at 20 1151    DULoxetine (CYMBALTA) delayed release capsule 60 mg, 60 mg, Oral, Daily, Gin Ledesma MD, 60 mg at 20 1000    [] gabapentin (NEURONTIN) capsule 100 mg, 100 mg, Oral, TID, 100 mg at 20 2113 **FOLLOWED BY** [COMPLETED] gabapentin (NEURONTIN) capsule 100 mg, 100 mg, Oral, BID, 100 mg at 20 1725 **FOLLOWED BY** gabapentin (NEURONTIN) capsule 100 mg, 100 mg, Oral, HS, Wesly Crenshaw MD    hydrocortisone 1 % cream, , Topical, 4x Daily PRN, Jaimie Cleaning MD    ibuprofen (MOTRIN) tablet 600 mg, 600 mg, Oral, Q8H Hermelinda Gomez, Wesly Crenshaw MD    iohexol (OMNIPAQUE) 240 MG/ML solution 50 mL, 50 mL, Oral, 90 min pre-procedure, Jaimie Cleaning MD    ketorolac (TORADOL) injection 15 mg, 15 mg, Intravenous, Q6H PRN, Wesly Crenshaw MD, 15 mg at 20 1005    lidocaine (LIDODERM) 5 % patch 2 patch, 2 patch, Topical, Daily, Jaimie Cleaning MD, 2 patch at 20 0607    magnesium citrate (CITROMA) oral solution 296 mL, 296 mL, Oral, Daily PRN, Jaimie Cleaning MD    melatonin tablet 6 mg, 6 mg, Oral, HS PRN, Jaimie Cleaning MD, 6 mg at 20    methocarbamol (ROBAXIN) tablet 750 mg, 750 mg, Oral, Q6H PRN, Wesly Crenshaw MD    methylnaltrexone (RELISTOR) subcutaneous injection 12 mg, 12 mg, Subcutaneous, Q48H PRN, Jaimie Cleaning MD    [START ON 9/10/2020] metoprolol tartrate (LOPRESSOR) partial tablet 12 5 mg, 12 5 mg, Oral, Once, Amara Browning PA-C    miconazole 2 % cream, , Topical, BID, Arturo Cage MD    mirtazapine (REMERON) tablet 30 mg, 30 mg, Oral, HS, Leonor Anaya MD    morphine injection 2 mg, 2 mg, Intravenous, Q4H PRN, Arturo Cage MD, 2 mg at 09/09/20 1215    mupirocin (BACTROBAN) 2 % nasal ointment 1 application, 1 application, Nasal, Q24G [address], Arturo Cage MD, 1 application at 51/00/86 0957    nystatin (MYCOSTATIN) cream, , Topical, BID, Gin Ledesma MD    OLANZapine (ZyPREXA) tablet 5 mg, 5 mg, Oral, HS, Leonor Anaya MD    ondansetron [facility]) injection 4 mg, 4 mg, Intravenous, Q6H PRN, Arturo Cage MD, 4 mg at 09/01/20 2127    oxyCODONE (ROXICODONE) IR tablet 15 mg, 15 mg, Oral, Q4H PRN, Arturo Cage MD, 15 mg at 09/09/20 1414    pantoprazole (PROTONIX) EC tablet 20 mg, 20 mg, Oral, Early Morning, Gin Ledesma MD, 20 mg at 09/09/20 0519    saccharomyces boulardii (FLORASTOR) capsule 250 mg, 250 mg, Oral, BID, Gin Ledesma MD, 250 mg at 09/09/20 0957    senna (SENOKOT) tablet 8 6 mg, 1 tablet, Oral, BID, Arturo Cage MD, 8 6 mg at 09/09/20 0957    REVIEW OF SYSTEMS:  More than 10 systems were reviewed  No other pertinent positive findings other than those mentioned in HPI  PHYSICAL EXAM:  Current Weight: Weight - Scale: 63 5 kg (139 lb 15 9 oz)  First Weight: Weight - Scale: 63 5 kg (139 lb 15 9 oz)  Vitals:    09/07/20 2325 09/08/20 0643 09/08/20 2300 09/09/20 1418   BP:  117/56 133/70 133/85   BP Location:       Pulse:  92 92 90   Resp:  17 20    Temp:  97 8 °F (36 6 °C) 98 1 °F (36 7 °C)    TempSrc:   Oral    SpO2: 97% 98% 98%    Weight:       Height:           Intake/Output Summary (Last 24 hours) at 9/9/2020 1449  Last data filed at 9/9/2020 1300  Gross per 24 hour   Intake 0 ml   Output    Net 0 ml     Physical Exam  Vitals signs and nursing note reviewed  Constitutional:       General: She is not in acute distress  Appearance: Normal appearance  She is well-developed  She is diaphoretic   She is not ill-appearing  HENT:      Head: Normocephalic and atraumatic  Mouth/Throat:      Pharynx: No oropharyngeal exudate  Comments: Right mandibular/lower cheek swelling  Eyes:      General: No scleral icterus  Right eye: No discharge  Left eye: No discharge  Neck:      Musculoskeletal: Normal range of motion and neck supple  Thyroid: No thyromegaly  Cardiovascular:      Rate and Rhythm: Normal rate and regular rhythm  Heart sounds: No murmur  Pulmonary:      Effort: Pulmonary effort is normal  No respiratory distress  Breath sounds: Normal breath sounds  No wheezing  Abdominal:      General: Bowel sounds are normal  There is no distension  Palpations: Abdomen is soft  Musculoskeletal: Normal range of motion  General: No swelling  Skin:     General: Skin is warm  Findings: No rash  Comments: +tattoos   Neurological:      Mental Status: She is alert  Motor: No abnormal muscle tone        Comments: awake   Psychiatric:         Mood and Affect: Mood normal       Comments: Pressured speech         Invasive Devices:      Lab Results:   Results from last 7 days   Lab Units 09/09/20  0525 09/07/20  0610 09/07/20  0609 09/05/20  0550   WBC Thousand/uL 5 84 5 48  --  5 98   HEMOGLOBIN g/dL 10 4* 9 2*  --  9 5*   HEMATOCRIT % 33 7* 29 9*  --  30 4*   PLATELETS Thousands/uL 304 283  --  273   POTASSIUM mmol/L 4 0  --  4 3 3 7   CHLORIDE mmol/L 105  --  109* 107   CO2 mmol/L 29  --  28 28   BUN mg/dL 15  --  15 17   CREATININE mg/dL 0 40*  --  0 38* 0 45*   CALCIUM mg/dL 9 2  --  8 2* 8 5   MAGNESIUM mg/dL 2 2  --   --   --    PHOSPHORUS mg/dL 6 4*  --   --   --    ALK PHOS U/L 189*  --  192*  --    ALT U/L 30  --  39  --    AST U/L 46*  --  73*  --

## 2020-09-09 NOTE — PROGRESS NOTES
Progress Note - Ernestine Avilez 1990, 34 y o  female MRN: [id number]    Unit/Bed#: [facility] 526-01 Encounter: [id number]    Primary Care Provider: Komal Jaimes PA-C   Date and time admitted to hospital: 8/12/2020  7:39 PM        Hyperphosphatemia  Assessment & Plan  Patient started on PhosLo t i d  By previous team; however phosphorus continuing to climb; 6 4 on morning labs  Unclear etiology at this time as patient with good GFR, not in metabolic respiratory acidosis, calcium levels within normal limits  Consult to Nephrology  Ensure low phos diet ordered    Rash  Assessment & Plan  · Bilateral buttocks, papular itchy rash in a linear fashion  · Less likely fungal, allergic vs monitor for shingles  · Will trial topical miconazole, if doesn't work try topical benadryl    Transaminitis  Assessment & Plan  Check acute hep panel - pt reports hx of Hep C  Positive for hep C antibodies  AST and ALP continue to be up and down    9/9-overall, LFTs appear to be improving  Refer patient to GI in the outpatient setting for treatment of hepatitis-C  DVT of axillary vein, acute left [facility])  Assessment & Plan  Increase lovenox to 60mg Q12h  Due to severe pain consulted with vascular surgery for treatment options - appreciated  Picc continues to function well and post recent blood culture is negative  Elevation of LUE  PICC team attempted to move picc line to right arm given left arm DVT pain  Unsuccessful  IR consulted   IR exchanged PICC 8/26  Needs 3 months of AC - can d/c on NOAC if affordable    hold Lovenox for tooth extraction today    9/8-will give Lovenox today; however discontinue after tonight's dose in preparation for CT surgery    Intravenous drug abuse [facility])  Assessment & Plan  · Patient with history of drug abuse and likely the source of bacteremia  · Patient has a history of signing against medical advise  · May benefit from drug rehab eventually if she becomes agreeable to host services  She was not interested during my conversation  · During the hospital stay in July of this year patient was positive for [facility] ,cocaine and opiates  Although pt previosuly reported to Dr Greg Voss that she had not used any drugs for several years now, but admitted to Dr Ashley Corea that she was actively abusing cocaine and heroin  · She affirm to previous team, after her boyfriend , that she doesn't want to use drugs & get high again  Right hip pain  Assessment & Plan  · Patient was complaining of severe back pain mainly in the lower part of the back and also in the sacral region  · Patient had MRI of the lumbar and thoracic spine during the recent hospital stay in [facility] which was unremarkable  · - continue Cymbalta 30 mg daily - increased to 60 mg daily on   · - continue robaxin to 750 mg q 6  · - continue oxy 10 mg and 15 mg  prn  · - continue iv morphine - decrease to 2q4 prn  · -continue tylenol prn - refused ATC  · - continue lidocaine patch if pt does not refuse  · -continue gabapentin to 100 TID - decrease to BID tomorrow  · add ibuprofen RTC and ppi for GI ppx  · Iliacus muscle abscess is noted on CT scan, continue antibiotic treatment as above, no indication for drainage at this time  · Repeat CT scan shows improvement in septic emboli to iliacus muscle  · ketamine infusion discontinued  · Patient has been reluctant with interventions and procedures  She cites that she wants IV pain meds before intervention  Extensive discussion regarding acute pain service recommendations  Will proceed with following recommendations by acute pain service  · MRI completed  shows sacroiliitis which ID recs against surgical intervention    ID recs weekly CRP and ESR, which are trending down   · Hold off on thoracolumbar MRI as pain in right hip and prior MRI unremarkable  · Opioid, benzo, and Neurontin taper scanned into media  · Neurontin taper already ordered  · Will have to follow taper as pt cannot be d/c on controlled substances and pain will need to be controlled w/ Cymbalta, Robaxin, ibuprofen, and tylenol  · 9/6: post tooth extraction, having lot of pain in tooth & hip, states 2 mg IV morphine doesn't help so had to give her one dose 3 mg    9/9-discussed with acute pain service; patient reports good relief with Toradol; 15 mg q 6 p r n  Bipolar 1 disorder ([facility] [address] )  Assessment & Plan  · Patient 8/27 agreed to psych eval which is apprecaited  · Psych added Zyprexa  · C/w Cymbalta and Remeron  · So far has been on p r n  IV Ativan  She states that it is not working for her  Getting panic attacks after passing away of her boyfriend about 3 days ago  · She discuss this was with Pain Service recommends changing her from IV Ativan to oral Xanax 1 mg q 4 hours p r n   Ordered that    Se[facility] ([facility]  Assessment & Plan  · Patient noted to have abnormalities seen in the CT of the chest abdomen and pelvis concerning for septic emboli  · There is pleural effusion on the CT scan of the chest and also on repeat chest x-ray  · Patient is rather asymptomatic from pulmonary standpoint  · Continue with the antibiotics  · Thoracic surgery have evaluated the pt and she is not short of breath   · 8/15-imaging right hip shows no osseous involvement; concern for proximity of abscess status SI joint, but no SI joint involvement at this time  · Management as above under hip pain  · c/o pleuritic CP  CXR shows no acute finding  Suspect pain could be related to septic emboli  · Chest CT performed preoperatively today shows resolution of right pleural effusion and improvement of several of the previous opacities with some new opacities which probably are post infectious/inflammatory resolving changes      Acute bacterial endocarditis  Assessment & Plan  · Patient noted to have tricuspid while vegetation on echocardiogram done in July    Patient had a transthoracic and also transesophageal echocardiogram done during the last hospital stay  · With septic emboli to lungs and posterior iliacus muscle  · Patient is transferred over here to be evaluated by CT surgery  · She was noncommittal to abstain from illegal drugs or unintended use of medications  · When she continues to abstain and completes iv abx treatment they would consider surgical correction - this will likely be as OP   · Continue IV cefazolin as above  · repeat echo - shows to severe TR, 14 x 8 mm mobile vegetation on tricuspid valve  No pericardial effusion, midly dilated RV (not mentioned before)  · Reconsulted CT surgery who recommend tricuspid valve replacement  Preoperative investigations being performed, OR next week  Patient understands that after this wall replacement if she ends up being back on drugs and gets it in the affected she will not get another surgery  · OMFS consulted for pre op clearance - s/p tooth extraction - 9/7  Having a lot of pain, had to give her an extra dose of 3mg IV morphine    9/8-as per cardiothoracic surgery; likely to perform tricuspid valve repair on 09/10  Patient has undergone tooth extraction and will hold Lovenox prior to surgery as per Cardiothoracic note  * Bacteremia due to Staphylococcus aureus  Assessment & Plan  · Recurrent MSSA bacteremia  · Initially admitted to [address] [place] [address] from 07/15-blood culture were positive for MSSA bacteremia, but patient signed AMA on 07/22  Her repeat blood culture done on 7/21 was negative after 5 days  · 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 during the last hospital stay from 7/15-7/22    · Plan is to continue IV cefazolin for a 6 week course per ID recommendations from 8/15 when B Cx became neg till   ·  removed PICC and PICC cx neg  · B Cx from  neg      VTE Pharmacologic Prophylaxis:   Pharmacologic: Pharmacologic VTE Prophylaxis contraindicated due to Pending open heart surgery  Mechanical VTE Prophylaxis in Place: Yes    Patient Centered Rounds: I have performed bedside rounds with nursing staff today  Discussions with Specialists or Other Care Team Provider:  CT surgery    Education and Discussions with Family / Patient: Care plan discussed with patient who voiced understanding and agrees with recommendations  Time Spent for Care: 30 minutes  More than 50% of total time spent on counseling and coordination of care as described above  Current Length of Stay: 28 day(s)    Current Patient Status: Inpatient   Certification Statement: The patient will continue to require additional inpatient hospital stay due to Completion of antibiotics for endocarditis and CT surgery    Discharge Plan: To be determined    Code Status: Level 1 - Full Code      Subjective:   Patient seen examined bedside, no acute distress or discomfort noted  Patient appeared well and last lethargic as compared to yesterday  Patient will underg open tricuspid repair tomorrow; NPO at midnight  Vital signs stable and phosphorus concerning as continues to elevate; 6 4 morning labs  Is taking PhosLo t i d  With meals; consult to Nephrology  Also, ensure patient is low phos diet  Pain appears well controlled and will start Toradol as patient reports this works better for her dental pain  Objective:     Vitals:   Temp (24hrs), Av 1 °F (36 7 °C), Min:98 1 °F (36 7 °C), Max:98 1 °F (36 7 °C)    Temp:  [98 1 °F (36 7 °C)] 98 1 °F (36 7 °C)  HR:  [92] 92  Resp:  [20] 20  BP: (133)/(70) 133/70  SpO2:  [98 %] 98 %  Body mass index is 23 3 kg/m²  Input and Output Summary (last 24 hours):        Intake/Output Summary (Last 24 hours) at 9/9/2020 1351  Last data filed at 9/9/2020 1300  Gross per 24 hour   Intake 0 ml   Output    Net 0 ml       Physical Exam:     Physical Exam  Vitals signs and nursing note reviewed  HENT:      Head: Normocephalic and atraumatic  Right Ear: External ear normal       Left Ear: External ear normal       Nose: Nose normal       Mouth/Throat:      Mouth: Mucous membranes are moist    Eyes:      Extraocular Movements: Extraocular movements intact  Conjunctiva/sclera: Conjunctivae normal       Pupils: Pupils are equal, round, and reactive to light  Neck:      Musculoskeletal: Normal range of motion and neck supple  Cardiovascular:      Rate and Rhythm: Tachycardia present  Musculoskeletal:         General: No tenderness  Comments: Pain and right hip with reduced range of motion   Skin:     General: Skin is warm and dry  Neurological:      Mental Status: She is alert and oriented to person, place, and time  Psychiatric:         Mood and Affect: Mood normal          Behavior: Behavior normal            Additional Data:     Labs:    Results from last 7 days   Lab Units 09/09/20  0525   WBC Thousand/uL 5 84   HEMOGLOBIN g/dL 10 4*   HEMATOCRIT % 33 7*   PLATELETS Thousands/uL 304   NEUTROS PCT % 42*   LYMPHS PCT % 36   MONOS PCT % 8   EOS PCT % 14*     Results from last 7 days   Lab Units 09/09/20  0525   SODIUM mmol/L 140   POTASSIUM mmol/L 4 0   CHLORIDE mmol/L 105   CO2 mmol/L 29   BUN mg/dL 15   CREATININE mg/dL 0 40*   ANION GAP mmol/L 6   CALCIUM mg/dL 9 2   ALBUMIN g/dL 3 0*   TOTAL BILIRUBIN mg/dL 0 22   ALK PHOS U/L 189*   ALT U/L 30   AST U/L 46*   GLUCOSE RANDOM mg/dL 90                           * I Have Reviewed All Lab Data Listed Above  * Additional Pertinent Lab Tests Reviewed:  All Labs Within Last 24 Hours Reviewed    Imaging:    Imaging Reports Reviewed Today Include:   Imaging Personally Reviewed by Myself Includes:      Recent Cultures (last 7 days): Last 24 Hours Medication List:   Current Facility-Administered Medications   Medication Dose Route Frequency Provider Last Rate    acetaminophen  650 mg Oral Q6H PRN Gin Ledesma MD      ALPRAZolam  1 mg Oral Q4H PRN Kalee Palma MD      alteplase  2 mg Intracatheter Q12H PRN Kalee Palma MD      calcium acetate  667 mg Oral TID With Meals Kalee Palma MD      calcium carbonate  1,000 mg Oral Daily PRN Kalee Palma MD      cefazolin  2,000 mg Intravenous Q8H Kalee Palma MD Stopped (09/09/20 1336)    chlorhexidine  15 mL Swish & Spit Q12H [address] Kalee Palma MD      dicyclomine  10 mg Oral TID PRN Kalee Palma MD      DULoxetine  60 mg Oral Daily Kalee Palma MD      gabapentin  100 mg Oral HS Bebe Izquierdo MD      hydrocortisone   Topical 4x Daily PRN Kalee Palma MD      ibuprofen  600 mg Oral [facility] Bebe Izquierdo MD      iohexol  50 mL Oral 90 min pre-procedure Kalee Palma MD      ketorolac  15 mg Intravenous Q6H PRN Bebe Izquierdo MD      lidocaine  2 patch Topical Daily Kalee Palma MD      magnesium citrate  296 mL Oral Daily PRN Kalee Palma MD      melatonin  6 mg Oral HS PRN Kalee Palma MD      methocarbamol  750 mg Oral Q6H PRN Bebe Izquierdo MD      methylnaltrexone bromide  12 mg Subcutaneous Q48H PRN Kalee Palma MD      [START ON 9/10/2020] metoprolol tartrate  12 5 mg Oral Once M D NALLELY Ochoa PA-C      miconazole   Topical BID Kalee Palma MD      mirtazapine  30 mg Oral HS Bebe Izquierdo MD      morphine injection  2 mg Intravenous Q4H PRN Kalee Palma MD      mupirocin  1 application Nasal U07N [address] Kalee Palma MD      nystatin   Topical BID Kalee Palma MD      OLANZapine  5 mg Oral HS Bebe Izquierdo MD      ondansetron  4 mg Intravenous Q6H PRN Kalee Palma MD      oxyCODONE  15 mg Oral Q4H PRN Kalee Palma MD      pantoprazole  20 mg Oral Early Morning Elana Sandhu MD      saccharomyces boulardii  250 mg Oral BID Elana Sandhu MD      senna  1 tablet Oral BID Elana Sandhu MD          Today, Patient Was Seen By: Karel Lawson MD    ** Please Note: Dictation voice to text software may have been used in the creation of this document   **

## 2020-09-09 NOTE — ASSESSMENT & PLAN NOTE
· Patient noted to have tricuspid while vegetation on echocardiogram done in July  Patient had a transthoracic and also transesophageal echocardiogram done during the last hospital stay  · With septic emboli to lungs and posterior iliacus muscle  · Patient is transferred over here to be evaluated by CT surgery  · She was noncommittal to abstain from illegal drugs or unintended use of medications  · When she continues to abstain and completes iv abx treatment they would consider surgical correction - this will likely be as OP   · Continue IV cefazolin as above  · repeat echo - shows to severe TR, 14 x 8 mm mobile vegetation on tricuspid valve  No pericardial effusion, midly dilated RV (not mentioned before)  · Reconsulted CT surgery who recommend tricuspid valve replacement  Preoperative investigations being performed, OR next week  Patient understands that after this wall replacement if she ends up being back on drugs and gets it in the affected she will not get another surgery  · OMFS consulted for pre op clearance - s/p tooth extraction - 9/7  Having a lot of pain, had to give her an extra dose of 3mg IV morphine    9/8-as per cardiothoracic surgery; likely to perform tricuspid valve repair on 09/10  Patient has undergone tooth extraction and will hold Lovenox prior to surgery as per Cardiothoracic note

## 2020-09-09 NOTE — ASSESSMENT & PLAN NOTE
Check acute hep panel - pt reports hx of Hep C  Positive for hep C antibodies  AST and ALP continue to be up and down    9/9-overall, LFTs appear to be improving    Refer patient to GI in the outpatient setting for treatment of hepatitis-C

## 2020-09-09 NOTE — OCCUPATIONAL THERAPY NOTE
Occupational Therapy Cancellation Note    Orders received and chart reviewed  Per EMR, Pt pending TVR with cardiac sx Thursday 9/10/2020  Will continue to follow to be seen for OT re-evaluation post-op w/ updated activity orders as appropriate/when medically cleared         Connor Santana MS, OTR/L

## 2020-09-09 NOTE — PROGRESS NOTES
Progress Note - Cardiothoracic Surgery   Mary Garcia 34 y o  female MRN: [id number]  Unit/Bed#: [facility] 526-01 Encounter: [id number]      24 Hour Events:  Patient apologetic about refusing examination yesterday  All questions regarding upcoming operation answered      Medications:   Scheduled Meds:  Current Facility-Administered Medications   Medication Dose Route Frequency Provider Last Rate    acetaminophen  650 mg Oral Q6H PRN Gin Ledesma MD      ALPRAZolam  1 mg Oral Q4H PRN Alycia Lopez MD      alteplase  2 mg Intracatheter Q12H PRN Alycia Lopez MD      calcium acetate  667 mg Oral TID With Meals Alycia Lopez MD      calcium carbonate  1,000 mg Oral Daily PRN Alycia Lopez MD      cefazolin  2,000 mg Intravenous Q8H Alycia Lopez MD 2,000 mg (09/09/20 0519)    chlorhexidine  15 mL Swish & Spit Q12H [address] Alycia Lopez MD      dicyclomine  10 mg Oral TID PRN Alycia Lopez MD      DULoxetine  60 mg Oral Daily Alycia Lopez MD      gabapentin  100 mg Oral HS Alycia Lopez MD      hydrocortisone   Topical 4x Daily PRN Alycia Lopez MD      ibuprofen  600 mg Oral Q8H [address] Gin Ledesma MD      iohexol  50 mL Oral 90 min pre-procedure Alycia Lopez MD      ketorolac  15 mg Intravenous Q6H PRN Doc Marie MD      lidocaine  2 patch Topical Daily Alycia Lopez MD      magnesium citrate  296 mL Oral Daily PRN Gin Ledesma MD      melatonin  6 mg Oral HS PRN Alycia Lopez MD      methocarbamol  750 mg Oral Q6H [address] Gin Ledesma MD      methylnaltrexone bromide  12 mg Subcutaneous Q48H PRN Alycia Lopez MD      [START ON 9/10/2020] metoprolol tartrate  12 5 mg Oral Once Jose Hair, PA-C      miconazole   Topical BID Alycia Lopez MD      mirtazapine  30 mg Oral HS Gin Ledesma MD      morphine injection  2 mg Intravenous Q4H PRN Alycia Lopez MD      mupirocin  1 application Nasal Q12H [address], MD      nystatin   Topical BID Gin Ldeesma MD      OLANZapine  5 mg Oral HS Gin Ledesma MD      ondansetron  4 mg Intravenous Q6H PRN Truett Challenger, MD      oxyCODONE  15 mg Oral Q4H PRN Truett Challenger, MD      pantoprazole  20 mg Oral Early Morning Truett Challenger, MD Tiffanie falcon boulardii  250 mg Oral BID Truett Challenger, MD      senna  1 tablet Oral BID Truett Challenger, MD       Continuous Infusions:     Results:   Results from last 7 days   Lab Units 09/09/20  0525 09/07/20  0610 09/05/20  0550   WBC Thousand/uL 5 84 5 48 5 98   HEMOGLOBIN g/dL 10 4* 9 2* 9 5*   HEMATOCRIT % 33 7* 29 9* 30 4*   PLATELETS Thousands/uL 304 283 273     Results from last 7 days   Lab Units 09/09/20  0525 09/07/20  0609 09/05/20  0550   POTASSIUM mmol/L 4 0 4 3 3 7   CHLORIDE mmol/L 105 109* 107   CO2 mmol/L 29 28 28   BUN mg/dL 15 15 17   CREATININE mg/dL 0 40* 0 38* 0 45*   CALCIUM mg/dL 9 2 8 2* 8 5           Studies:     Echo: EF 55%, severe TR, medium sized fixed vegetation, 14 mm x 8 mm on anterior leaflet, mildly dilated RV     Carotid artery duplex:   < 50% right carotid stenosis  Vertebral artery flow is antegrade and There is no significant subclavian artery   < 50% left carotid stenosis  Vertebral artery flow is antegrade and There is no significant subclavian artery    Vitals:   Vitals:    09/07/20 1540 09/07/20 2325 09/08/20 0643 09/08/20 2300   BP: 113/65  117/56 133/70   BP Location: Right arm      Pulse:   92 92   Resp:   17 20   Temp: 98 °F (36 7 °C)  97 8 °F (36 6 °C) 98 1 °F (36 7 °C)   TempSrc: Oral   Oral   SpO2:  97% 98% 98%   Weight:       Height:         Physical Exam:    Physical Exam:    HEENT/NECK:  PERRLA  No jugular venous distention      Cardiac: Regular rate and rhythm  Pulmonary:  Breath sounds clear bilaterally and No rales/rhonchi/wheezes  Abdomen:  Non-tender, Non-distended and Normal bowel sounds  Extremities: Extremities warm/dry and No edema B/L  Neuro: Alert and oriented X 3, Sensation is grossly intact and No focal deficits  Skin: Warm/Dry, without rashes or lesions  Assessment:    Tricuspid  Valve endocarditis; Ongoing TVR workup    Plan:  Patient agreeable to proceed with surgery; Informed consent signed  Transfer to  today  [facility] U/S completed and acceptable      Blood type and cross match complete  Continue Mupirocin 2% nasal ointment q 12 hrs  Continue topical chlorhexidine bath and mouth rise  Preoperative oxygen, beta blocker, insulin, and antibiotics ordered tricuspid valve replacement scheduled for Thursday with SARAH Langford: Marylin Villarreal PA-C  DATE: September 9, 2020  TIME: 1:13 PM

## 2020-09-09 NOTE — ASSESSMENT & PLAN NOTE
· Patient with history of drug abuse and likely the source of bacteremia  · Patient has a history of signing against medical advise  · May benefit from drug rehab eventually if she becomes agreeable to host services  She was not interested during my conversation  · During the hospital stay in July of this year patient was positive for [facility] ,cocaine and opiates  Although pt previosuly reported to Dr Darwin Salgado that she had not used any drugs for several years now, but admitted to Dr Brandie Azevedo that she was actively abusing cocaine and heroin  · She affirm to previous team, after her boyfriend , that she doesn't want to use drugs & get high again

## 2020-09-09 NOTE — ASSESSMENT & PLAN NOTE
· Recurrent MSSA bacteremia  · Initially admitted to [address] [place] [address] from 07/15-blood culture were positive for MSSA bacteremia, but patient signed AMA on 07/22  Her repeat blood culture done on 7/21 was negative after 5 days  · [facility] on 07/30-blood culture came back positive for MSSA and she left again is medical advice on 08/04  Her blood cultures came back positive on 07/30, but the bacteremia cleared as of 8 /2  Patient left AMA on 08/04  · Readmitted to [address] [place] [address] on 08/10-blood culture came back positive for Staph aureus  · 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

## 2020-09-09 NOTE — PLAN OF CARE
Problem: Prexisting or High Potential for Compromised Skin Integrity  Goal: Skin integrity is maintained or improved  Description: INTERVENTIONS:  - Identify patients at risk for skin breakdown  - Assess and monitor skin integrity  - Assess and monitor nutrition and hydration status  - Monitor labs   - Assess for incontinence   - Turn and reposition patient  - Assist with mobility/ambulation  - Relieve pressure over bony prominences  - Avoid friction and shearing  - Provide appropriate hygiene as needed including keeping skin clean and dry  - Evaluate need for skin moisturizer/barrier cream  - Collaborate with interdisciplinary team   - Patient/family teaching  - Consider wound care consult   Outcome: Progressing     Problem: PAIN - ADULT  Goal: Verbalizes/displays adequate comfort level or baseline comfort level  Description: Interventions:  - Encourage patient to monitor pain and request assistance  - Assess pain using appropriate pain scale  - Administer analgesics based on type and severity of pain and evaluate response  - Implement non-pharmacological measures as appropriate and evaluate response  - Consider cultural and social influences on pain and pain management  - Notify physician/advanced practitioner if interventions unsuccessful or patient reports new pain  Outcome: Progressing     Problem: INFECTION - ADULT  Goal: Absence or prevention of progression during hospitalization  Description: INTERVENTIONS:  - Assess and monitor for signs and symptoms of infection  - Monitor lab/diagnostic results  - Monitor all insertion sites, i e  indwelling lines, tubes, and drains  - Monitor endotracheal if appropriate and nasal secretions for changes in amount and color  - [place] appropriate cooling/warming therapies per order  - Administer medications as ordered  - Instruct and encourage patient and family to use good hand hygiene technique  - Identify and instruct in appropriate isolation precautions for identified infection/condition  Outcome: Progressing  Goal: Absence of fever/infection during neutropenic period  Description: INTERVENTIONS:  - Monitor WBC    Outcome: Progressing     Problem: SAFETY ADULT  Goal: Patient will remain free of falls  Description: INTERVENTIONS:  - Assess patient frequently for physical needs  -  Identify cognitive and physical deficits and behaviors that affect risk of falls    -  [place] fall precautions as indicated by assessment   - Educate patient/family on patient safety including physical limitations  - Instruct patient to call for assistance with activity based on assessment  - Modify environment to reduce risk of injury  - Consider OT/PT consult to assist with strengthening/mobility  Outcome: Progressing  Goal: Maintain or return to baseline ADL function  Description: INTERVENTIONS:  -  Assess patient's ability to carry out ADLs; assess patient's baseline for ADL function and identify physical deficits which impact ability to perform ADLs (bathing, care of mouth/teeth, toileting, grooming, dressing, etc )  - Assess/evaluate cause of self-care deficits   - Assess range of motion  - Assess patient's mobility; develop plan if impaired  - Assess patient's need for assistive devices and provide as appropriate  - Encourage maximum independence but intervene and supervise when necessary  - Involve family in performance of ADLs  - Assess for home care needs following discharge   - Consider OT consult to assist with ADL evaluation and planning for discharge  - Provide patient education as appropriate  Outcome: Progressing  Goal: Maintain or return mobility status to optimal level  Description: INTERVENTIONS:  - Assess patient's baseline mobility status (ambulation, transfers, stairs, etc )    - Identify cognitive and physical deficits and behaviors that affect mobility  - Identify mobility aids required to assist with transfers and/or ambulation (gait belt, sit-to-stand, lift, walker, cane, etc )  - [place] fall precautions as indicated by assessment  - Record patient progress and toleration of activity level on Mobility SBAR; progress patient to next Phase/Stage  - Instruct patient to call for assistance with activity based on assessment  - Consider rehabilitation consult to assist with strengthening/weightbearing, etc   Outcome: Progressing     Problem: CARDIOVASCULAR - ADULT  Goal: Maintains optimal cardiac output and hemodynamic stability  Description: INTERVENTIONS:  - Monitor I/O, vital signs and rhythm  - Monitor for S/S and trends of decreased cardiac output  - Administer and titrate ordered vasoactive medications to optimize hemodynamic stability  - Assess quality of pulses, skin color and temperature  - Assess for signs of decreased coronary artery perfusion  - Instruct patient to report change in severity of symptoms  Outcome: Progressing  Goal: Absence of cardiac dysrhythmias or at baseline rhythm  Description: INTERVENTIONS:  - Continuous cardiac monitoring, vital signs, obtain 12 lead EKG if ordered  - Administer antiarrhythmic and heart rate control medications as ordered  - Monitor electrolytes and administer replacement therapy as ordered  Outcome: Progressing     Problem: METABOLIC, FLUID AND ELECTROLYTES - ADULT  Goal: Electrolytes maintained within normal limits  Description: INTERVENTIONS:  - Monitor labs and assess patient for signs and symptoms of electrolyte imbalances  - Administer electrolyte replacement as ordered  - Monitor response to electrolyte replacements, including repeat lab results as appropriate  - Instruct patient on fluid and nutrition as appropriate  Outcome: Progressing  Goal: Fluid balance maintained  Description: INTERVENTIONS:  - Monitor labs   - Monitor I/O and WT  - Instruct patient on fluid and nutrition as appropriate  - Assess for signs & symptoms of volume excess or deficit  Outcome: Progressing  Goal: Glucose maintained within target range  Description: INTERVENTIONS:  - Monitor Blood Glucose as ordered  - Assess for signs and symptoms of hyperglycemia and hypoglycemia  - Administer ordered medications to maintain glucose within target range  - Assess nutritional intake and initiate nutrition service referral as needed  Outcome: Progressing     Problem: HEMATOLOGIC - ADULT  Goal: Maintains hematologic stability  Description: INTERVENTIONS  - Assess for signs and symptoms of bleeding or hemorrhage  - Monitor labs  - Administer supportive blood products/factors as ordered and appropriate  Outcome: Progressing     Problem: Potential for Falls  Goal: Patient will remain free of falls  Description: INTERVENTIONS:  - Assess patient frequently for physical needs  -  Identify cognitive and physical deficits and behaviors that affect risk of falls  -  [place] fall precautions as indicated by assessment   - Educate patient/family on patient safety including physical limitations  - Instruct patient to call for assistance with activity based on assessment  - Modify environment to reduce risk of injury  - Consider OT/PT consult to assist with strengthening/mobility  Outcome: Progressing     Problem: Nutrition/Hydration-ADULT  Goal: Nutrient/Hydration intake appropriate for improving, restoring or maintaining nutritional needs  Description: Monitor and assess patient's nutrition/hydration status for malnutrition  Collaborate with interdisciplinary team and initiate plan and interventions as ordered  Monitor patient's weight and dietary intake as ordered or per policy  Utilize nutrition screening tool and intervene as necessary  Determine patient's food preferences and provide high-protein, high-caloric foods as appropriate       INTERVENTIONS:  - Monitor oral intake, urinary output, labs, and treatment plans  - Assess nutrition and hydration status and recommend course of action  - Evaluate amount of meals eaten  - Assist patient with eating if necessary - Allow adequate time for meals  - Recommend/ encourage appropriate diets, oral nutritional supplements, and vitamin/mineral supplements  - Order, calculate, and assess calorie counts as needed  - Recommend, monitor, and adjust tube feedings and TPN/PPN based on assessed needs  - Assess need for intravenous fluids  - Provide specific nutrition/hydration education as appropriate  - Include patient/family/caregiver in decisions related to nutrition  Outcome: Progressing     Problem: Knowledge Deficit  Goal: Patient/family/caregiver demonstrates understanding of disease process, treatment plan, medications, and discharge instructions  Description: Complete learning assessment and assess knowledge base    Interventions:  - Provide teaching at level of understanding  - Provide teaching via preferred learning methods  Outcome: Progressing

## 2020-09-10 ENCOUNTER — APPOINTMENT (INPATIENT)
Dept: NON INVASIVE DIAGNOSTICS | Facility: HOSPITAL | Age: 30
DRG: 163 | End: 2020-09-10
Attending: THORACIC SURGERY (CARDIOTHORACIC VASCULAR SURGERY)
Payer: COMMERCIAL

## 2020-09-10 ENCOUNTER — APPOINTMENT (INPATIENT)
Dept: RADIOLOGY | Facility: HOSPITAL | Age: 30
DRG: 163 | End: 2020-09-10
Payer: COMMERCIAL

## 2020-09-10 ENCOUNTER — ANESTHESIA (INPATIENT)
Dept: PERIOP | Facility: HOSPITAL | Age: 30
DRG: 163 | End: 2020-09-10
Payer: COMMERCIAL

## 2020-09-10 VITALS — HEART RATE: 90 BPM

## 2020-09-10 PROBLEM — I07.1 TRICUSPID REGURGITATION: Status: ACTIVE | Noted: 2020-09-10

## 2020-09-10 LAB
ADDITIONAL SETTING: 5
ALBUMIN SERPL BCP-MCNC: 2.8 G/DL (ref 3.5–5)
ALP SERPL-CCNC: 174 U/L (ref 46–116)
ALT SERPL W P-5'-P-CCNC: 28 U/L (ref 12–78)
ANCILLARY VALUES: 0
ANION GAP SERPL CALCULATED.3IONS-SCNC: 4 MMOL/L (ref 4–13)
ANION GAP SERPL CALCULATED.3IONS-SCNC: 8 MMOL/L (ref 4–13)
APTT PPP: 33 SECONDS (ref 23–37)
AST SERPL W P-5'-P-CCNC: 47 U/L (ref 5–45)
ATRIAL RATE: 95 BPM
BASE EXCESS BLDA CALC-SCNC: -1 MMOL/L (ref -2–3)
BASE EXCESS BLDA CALC-SCNC: -2 MMOL/L (ref -2–3)
BASE EXCESS BLDA CALC-SCNC: 0 MMOL/L (ref -2–3)
BILIRUB SERPL-MCNC: 0.2 MG/DL (ref 0.2–1)
BUN SERPL-MCNC: 14 MG/DL (ref 5–25)
BUN SERPL-MCNC: 17 MG/DL (ref 5–25)
CA-I BLD-SCNC: 0.99 MMOL/L (ref 1.12–1.32)
CA-I BLD-SCNC: 1.09 MMOL/L (ref 1.12–1.32)
CA-I BLD-SCNC: 1.09 MMOL/L (ref 1.12–1.32)
CA-I BLD-SCNC: 1.11 MMOL/L (ref 1.12–1.32)
CA-I BLD-SCNC: 1.18 MMOL/L (ref 1.12–1.32)
CA-I BLD-SCNC: 1.19 MMOL/L (ref 1.12–1.32)
CA-I BLD-SCNC: 1.21 MMOL/L (ref 1.12–1.32)
CA-I BLD-SCNC: 1.28 MMOL/L (ref 1.12–1.32)
CALCIUM SERPL-MCNC: 8.1 MG/DL (ref 8.3–10.1)
CALCIUM SERPL-MCNC: 8.8 MG/DL (ref 8.3–10.1)
CHLORIDE SERPL-SCNC: 105 MMOL/L (ref 100–108)
CHLORIDE SERPL-SCNC: 108 MMOL/L (ref 100–108)
CO2 SERPL-SCNC: 26 MMOL/L (ref 21–32)
CO2 SERPL-SCNC: 29 MMOL/L (ref 21–32)
CREAT SERPL-MCNC: 0.42 MG/DL (ref 0.6–1.3)
CREAT SERPL-MCNC: 0.47 MG/DL (ref 0.6–1.3)
DS:DELIVERY SYSTEM: AC
FERRITIN SERPL-MCNC: 71 NG/ML (ref 8–388)
FIBRINOGEN PPP-MCNC: 266 MG/DL (ref 227–495)
FIO2 GAS DIL.REBREATH: 40 L
GFR SERPL CREATININE-BSD FRML MDRD: 136 ML/MIN/1.73SQ M
GFR SERPL CREATININE-BSD FRML MDRD: 141 ML/MIN/1.73SQ M
GLUCOSE SERPL-MCNC: 100 MG/DL (ref 65–140)
GLUCOSE SERPL-MCNC: 102 MG/DL (ref 65–140)
GLUCOSE SERPL-MCNC: 110 MG/DL (ref 65–140)
GLUCOSE SERPL-MCNC: 123 MG/DL (ref 65–140)
GLUCOSE SERPL-MCNC: 124 MG/DL (ref 65–140)
GLUCOSE SERPL-MCNC: 128 MG/DL (ref 65–140)
GLUCOSE SERPL-MCNC: 140 MG/DL (ref 65–140)
GLUCOSE SERPL-MCNC: 147 MG/DL (ref 65–140)
GLUCOSE SERPL-MCNC: 172 MG/DL (ref 65–140)
GLUCOSE SERPL-MCNC: 220 MG/DL (ref 65–140)
GLUCOSE SERPL-MCNC: 84 MG/DL (ref 65–140)
GLUCOSE SERPL-MCNC: 88 MG/DL (ref 65–140)
GLUCOSE SERPL-MCNC: 88 MG/DL (ref 65–140)
GLUCOSE SERPL-MCNC: 90 MG/DL (ref 65–140)
GLUCOSE SERPL-MCNC: 92 MG/DL (ref 65–140)
GLUCOSE SERPL-MCNC: 95 MG/DL (ref 65–140)
GLUCOSE SERPL-MCNC: 97 MG/DL (ref 65–140)
GLUCOSE SERPL-MCNC: 98 MG/DL (ref 65–140)
HCO3 BLDA-SCNC: 23.7 MMOL/L (ref 22–28)
HCO3 BLDA-SCNC: 23.9 MMOL/L (ref 22–28)
HCO3 BLDA-SCNC: 24.7 MMOL/L (ref 24–30)
HCO3 BLDA-SCNC: 25.1 MMOL/L (ref 22–28)
HCO3 BLDA-SCNC: 25.1 MMOL/L (ref 22–28)
HCO3 BLDA-SCNC: 25.8 MMOL/L (ref 22–28)
HCO3 BLDA-SCNC: 26.7 MMOL/L (ref 24–30)
HCO3 BLDA-SCNC: 27.5 MMOL/L (ref 24–30)
HCT VFR BLD AUTO: 28.2 % (ref 34.8–46.1)
HCT VFR BLD AUTO: 34.4 % (ref 34.8–46.1)
HCT VFR BLD CALC: 21 % (ref 34.8–46.1)
HCT VFR BLD CALC: 24 % (ref 34.8–46.1)
HCT VFR BLD CALC: 26 % (ref 34.8–46.1)
HCT VFR BLD CALC: 29 % (ref 34.8–46.1)
HCT VFR BLD CALC: 30 % (ref 34.8–46.1)
HCT VFR BLD CALC: 32 % (ref 34.8–46.1)
HGB BLD-MCNC: 10.8 G/DL (ref 11.5–15.4)
HGB BLD-MCNC: 9.1 G/DL (ref 11.5–15.4)
HGB BLDA-MCNC: 10.2 G/DL (ref 11.5–15.4)
HGB BLDA-MCNC: 10.9 G/DL (ref 11.5–15.4)
HGB BLDA-MCNC: 7.1 G/DL (ref 11.5–15.4)
HGB BLDA-MCNC: 8.2 G/DL (ref 11.5–15.4)
HGB BLDA-MCNC: 8.8 G/DL (ref 11.5–15.4)
HGB BLDA-MCNC: 9.9 G/DL (ref 11.5–15.4)
INR PPP: 1.06 (ref 0.84–1.19)
IRON SATN MFR SERPL: 17 %
IRON SERPL-MCNC: 53 UG/DL (ref 50–170)
KCT BLD-ACNC: 110 SEC (ref 89–137)
KCT BLD-ACNC: 126 SEC (ref 89–137)
KCT BLD-ACNC: 403 SEC (ref 89–137)
KCT BLD-ACNC: 435 SEC (ref 89–137)
KCT BLD-ACNC: 514 SEC (ref 89–137)
KCT BLD-ACNC: 514 SEC (ref 89–137)
KCT BLD-ACNC: 520 SEC (ref 89–137)
P AXIS: 54 DEGREES
PCO2 BLD: 25 MMOL/L (ref 21–32)
PCO2 BLD: 25 MMOL/L (ref 21–32)
PCO2 BLD: 26 MMOL/L (ref 21–32)
PCO2 BLD: 26 MMOL/L (ref 21–32)
PCO2 BLD: 27 MMOL/L (ref 21–32)
PCO2 BLD: 27 MMOL/L (ref 21–32)
PCO2 BLD: 28 MMOL/L (ref 21–32)
PCO2 BLD: 30 MMOL/L (ref 21–32)
PCO2 BLD: 42.4 MM HG (ref 36–44)
PCO2 BLD: 43.1 MM HG (ref 36–44)
PCO2 BLD: 44.7 MM HG (ref 36–44)
PCO2 BLD: 46.8 MM HG (ref 36–44)
PCO2 BLD: 48.6 MM HG (ref 36–44)
PCO2 BLD: 52 MM HG (ref 42–50)
PCO2 BLD: 55 MM HG (ref 42–50)
PCO2 BLD: 80.8 MM HG (ref 42–50)
PH BLD: 7.14 [PH] (ref 7.3–7.4)
PH BLD: 7.26 [PH] (ref 7.3–7.4)
PH BLD: 7.32 [PH] (ref 7.3–7.4)
PH BLD: 7.33 [PH] (ref 7.35–7.45)
PH BLD: 7.34 [PH] (ref 7.35–7.45)
PH BLD: 7.35 [PH] (ref 7.35–7.45)
PH BLD: 7.36 [PH] (ref 7.35–7.45)
PH BLD: 7.36 [PH] (ref 7.35–7.45)
PHOSPHATE SERPL-MCNC: 5.2 MG/DL (ref 2.7–4.5)
PHOSPHATE SERPL-MCNC: 6.5 MG/DL (ref 2.7–4.5)
PLATELET # BLD AUTO: 269 THOUSANDS/UL (ref 149–390)
PLATELET # BLD AUTO: 277 THOUSANDS/UL (ref 149–390)
PMV BLD AUTO: 8.2 FL (ref 8.9–12.7)
PMV BLD AUTO: 8.5 FL (ref 8.9–12.7)
PO2 BLD: 248 MM HG (ref 75–129)
PO2 BLD: 323 MM HG (ref 75–129)
PO2 BLD: 332 MM HG (ref 75–129)
PO2 BLD: 38 MM HG (ref 35–45)
PO2 BLD: 45 MM HG (ref 35–45)
PO2 BLD: 49 MM HG (ref 35–45)
PO2 BLD: 62 MM HG (ref 75–129)
PO2 BLD: 95 MM HG (ref 75–129)
POTASSIUM BLD-SCNC: 3.4 MMOL/L (ref 3.5–5.3)
POTASSIUM BLD-SCNC: 3.9 MMOL/L (ref 3.5–5.3)
POTASSIUM BLD-SCNC: 4.4 MMOL/L (ref 3.5–5.3)
POTASSIUM BLD-SCNC: 4.4 MMOL/L (ref 3.5–5.3)
POTASSIUM BLD-SCNC: 4.5 MMOL/L (ref 3.5–5.3)
POTASSIUM BLD-SCNC: 4.7 MMOL/L (ref 3.5–5.3)
POTASSIUM BLD-SCNC: 4.8 MMOL/L (ref 3.5–5.3)
POTASSIUM BLD-SCNC: 5 MMOL/L (ref 3.5–5.3)
POTASSIUM SERPL-SCNC: 4 MMOL/L (ref 3.5–5.3)
POTASSIUM SERPL-SCNC: 4.1 MMOL/L (ref 3.5–5.3)
POTASSIUM SERPL-SCNC: 4.6 MMOL/L (ref 3.5–5.3)
POTASSIUM SERPL-SCNC: 5.8 MMOL/L (ref 3.5–5.3)
PR INTERVAL: 163 MS
PRESSURE SETTING: 10
PROT SERPL-MCNC: 7.1 G/DL (ref 6.4–8.2)
PROTHROMBIN TIME: 13.8 SECONDS (ref 11.6–14.5)
QRS AXIS: 76 DEGREES
QRSD INTERVAL: 83 MS
QT INTERVAL: 363 MS
QTC INTERVAL: 457 MS
RESPIRATORY RATE: 160
SAO2 % BLD FROM PO2: 100 % (ref 60–85)
SAO2 % BLD FROM PO2: 64 % (ref 60–85)
SAO2 % BLD FROM PO2: 65 % (ref 60–85)
SAO2 % BLD FROM PO2: 81 % (ref 60–85)
SAO2 % BLD FROM PO2: 90 % (ref 60–85)
SAO2 % BLD FROM PO2: 97 % (ref 60–85)
SODIUM BLD-SCNC: 134 MMOL/L (ref 136–145)
SODIUM BLD-SCNC: 135 MMOL/L (ref 136–145)
SODIUM BLD-SCNC: 135 MMOL/L (ref 136–145)
SODIUM BLD-SCNC: 136 MMOL/L (ref 136–145)
SODIUM BLD-SCNC: 137 MMOL/L (ref 136–145)
SODIUM BLD-SCNC: 138 MMOL/L (ref 136–145)
SODIUM BLD-SCNC: 138 MMOL/L (ref 136–145)
SODIUM BLD-SCNC: 140 MMOL/L (ref 136–145)
SODIUM SERPL-SCNC: 138 MMOL/L (ref 136–145)
SODIUM SERPL-SCNC: 142 MMOL/L (ref 136–145)
SPECIMEN SOURCE: ABNORMAL
SPECIMEN SOURCE: NORMAL
SPECIMEN SOURCE: NORMAL
T WAVE AXIS: 31 DEGREES
T4 FREE SERPL-MCNC: 0.95 NG/DL (ref 0.76–1.46)
TIBC SERPL-MCNC: 316 UG/DL (ref 250–450)
TSH SERPL DL<=0.05 MIU/L-ACNC: 7.62 UIU/ML (ref 0.36–3.74)
VENT TYPE: ABNORMAL
VENTILATION VALUE: 430
VENTRICULAR RATE: 95 BPM

## 2020-09-10 PROCEDURE — 02UJ0JZ SUPPLEMENT TRICUSPID VALVE WITH SYNTHETIC SUBSTITUTE, OPEN APPROACH: ICD-10-PCS | Performed by: THORACIC SURGERY (CARDIOTHORACIC VASCULAR SURGERY)

## 2020-09-10 PROCEDURE — 87070 CULTURE OTHR SPECIMN AEROBIC: CPT | Performed by: THORACIC SURGERY (CARDIOTHORACIC VASCULAR SURGERY)

## 2020-09-10 PROCEDURE — 85014 HEMATOCRIT: CPT | Performed by: PHYSICIAN ASSISTANT

## 2020-09-10 PROCEDURE — 82803 BLOOD GASES ANY COMBINATION: CPT

## 2020-09-10 PROCEDURE — 85347 COAGULATION TIME ACTIVATED: CPT

## 2020-09-10 PROCEDURE — 33464 VALVULOPLASTY TRICUSPID: CPT | Performed by: PHYSICIAN ASSISTANT

## 2020-09-10 PROCEDURE — 84100 ASSAY OF PHOSPHORUS: CPT | Performed by: INTERNAL MEDICINE

## 2020-09-10 PROCEDURE — 94002 VENT MGMT INPAT INIT DAY: CPT

## 2020-09-10 PROCEDURE — 87205 SMEAR GRAM STAIN: CPT | Performed by: THORACIC SURGERY (CARDIOTHORACIC VASCULAR SURGERY)

## 2020-09-10 PROCEDURE — 5A1221Z PERFORMANCE OF CARDIAC OUTPUT, CONTINUOUS: ICD-10-PCS | Performed by: THORACIC SURGERY (CARDIOTHORACIC VASCULAR SURGERY)

## 2020-09-10 PROCEDURE — 85049 AUTOMATED PLATELET COUNT: CPT | Performed by: PHYSICIAN ASSISTANT

## 2020-09-10 PROCEDURE — 82947 ASSAY GLUCOSE BLOOD QUANT: CPT

## 2020-09-10 PROCEDURE — 5A1223Z PERFORMANCE OF CARDIAC PACING, CONTINUOUS: ICD-10-PCS | Performed by: THORACIC SURGERY (CARDIOTHORACIC VASCULAR SURGERY)

## 2020-09-10 PROCEDURE — 84132 ASSAY OF SERUM POTASSIUM: CPT | Performed by: PHYSICIAN ASSISTANT

## 2020-09-10 PROCEDURE — 84132 ASSAY OF SERUM POTASSIUM: CPT

## 2020-09-10 PROCEDURE — 02HV33Z INSERTION OF INFUSION DEVICE INTO SUPERIOR VENA CAVA, PERCUTANEOUS APPROACH: ICD-10-PCS | Performed by: ANESTHESIOLOGY

## 2020-09-10 PROCEDURE — 82948 REAGENT STRIP/BLOOD GLUCOSE: CPT

## 2020-09-10 PROCEDURE — 99223 1ST HOSP IP/OBS HIGH 75: CPT | Performed by: EMERGENCY MEDICINE

## 2020-09-10 PROCEDURE — 80053 COMPREHEN METABOLIC PANEL: CPT | Performed by: INTERNAL MEDICINE

## 2020-09-10 PROCEDURE — 93010 ELECTROCARDIOGRAM REPORT: CPT | Performed by: INTERNAL MEDICINE

## 2020-09-10 PROCEDURE — 02QJ0ZZ REPAIR TRICUSPID VALVE, OPEN APPROACH: ICD-10-PCS | Performed by: THORACIC SURGERY (CARDIOTHORACIC VASCULAR SURGERY)

## 2020-09-10 PROCEDURE — 83540 ASSAY OF IRON: CPT | Performed by: INTERNAL MEDICINE

## 2020-09-10 PROCEDURE — 85610 PROTHROMBIN TIME: CPT | Performed by: PHYSICIAN ASSISTANT

## 2020-09-10 PROCEDURE — 85730 THROMBOPLASTIN TIME PARTIAL: CPT | Performed by: PHYSICIAN ASSISTANT

## 2020-09-10 PROCEDURE — 93355 ECHO TRANSESOPHAGEAL (TEE): CPT

## 2020-09-10 PROCEDURE — 84100 ASSAY OF PHOSPHORUS: CPT | Performed by: STUDENT IN AN ORGANIZED HEALTH CARE EDUCATION/TRAINING PROGRAM

## 2020-09-10 PROCEDURE — 80048 BASIC METABOLIC PNL TOTAL CA: CPT | Performed by: PHYSICIAN ASSISTANT

## 2020-09-10 PROCEDURE — 85014 HEMATOCRIT: CPT

## 2020-09-10 PROCEDURE — 71045 X-RAY EXAM CHEST 1 VIEW: CPT

## 2020-09-10 PROCEDURE — 85049 AUTOMATED PLATELET COUNT: CPT | Performed by: THORACIC SURGERY (CARDIOTHORACIC VASCULAR SURGERY)

## 2020-09-10 PROCEDURE — 84439 ASSAY OF FREE THYROXINE: CPT | Performed by: INTERNAL MEDICINE

## 2020-09-10 PROCEDURE — 87522 HEPATITIS C REVRS TRNSCRPJ: CPT | Performed by: INTERNAL MEDICINE

## 2020-09-10 PROCEDURE — 82728 ASSAY OF FERRITIN: CPT | Performed by: INTERNAL MEDICINE

## 2020-09-10 PROCEDURE — 85018 HEMOGLOBIN: CPT | Performed by: PHYSICIAN ASSISTANT

## 2020-09-10 PROCEDURE — 99232 SBSQ HOSP IP/OBS MODERATE 35: CPT | Performed by: INTERNAL MEDICINE

## 2020-09-10 PROCEDURE — 84443 ASSAY THYROID STIM HORMONE: CPT | Performed by: INTERNAL MEDICINE

## 2020-09-10 PROCEDURE — 93005 ELECTROCARDIOGRAM TRACING: CPT

## 2020-09-10 PROCEDURE — 82330 ASSAY OF CALCIUM: CPT

## 2020-09-10 PROCEDURE — 94760 N-INVAS EAR/PLS OXIMETRY 1: CPT

## 2020-09-10 PROCEDURE — 33464 VALVULOPLASTY TRICUSPID: CPT | Performed by: THORACIC SURGERY (CARDIOTHORACIC VASCULAR SURGERY)

## 2020-09-10 PROCEDURE — 87176 TISSUE HOMOGENIZATION CULTR: CPT | Performed by: THORACIC SURGERY (CARDIOTHORACIC VASCULAR SURGERY)

## 2020-09-10 PROCEDURE — 99254 IP/OBS CNSLTJ NEW/EST MOD 60: CPT | Performed by: INTERNAL MEDICINE

## 2020-09-10 PROCEDURE — 87075 CULTR BACTERIA EXCEPT BLOOD: CPT | Performed by: THORACIC SURGERY (CARDIOTHORACIC VASCULAR SURGERY)

## 2020-09-10 PROCEDURE — 83550 IRON BINDING TEST: CPT | Performed by: INTERNAL MEDICINE

## 2020-09-10 PROCEDURE — 84295 ASSAY OF SERUM SODIUM: CPT

## 2020-09-10 PROCEDURE — 84166 PROTEIN E-PHORESIS/URINE/CSF: CPT | Performed by: INTERNAL MEDICINE

## 2020-09-10 PROCEDURE — 85384 FIBRINOGEN ACTIVITY: CPT | Performed by: PHYSICIAN ASSISTANT

## 2020-09-10 PROCEDURE — 84166 PROTEIN E-PHORESIS/URINE/CSF: CPT | Performed by: PATHOLOGY

## 2020-09-10 DEVICE — IMPLANTABLE DEVICE: Type: IMPLANTABLE DEVICE | Site: HEART | Status: FUNCTIONAL

## 2020-09-10 RX ORDER — GLYCOPYRROLATE 0.2 MG/ML
INJECTION INTRAMUSCULAR; INTRAVENOUS AS NEEDED
Status: DISCONTINUED | OUTPATIENT
Start: 2020-09-10 | End: 2020-09-10

## 2020-09-10 RX ORDER — PROTAMINE SULFATE 10 MG/ML
INJECTION, SOLUTION INTRAVENOUS AS NEEDED
Status: DISCONTINUED | OUTPATIENT
Start: 2020-09-10 | End: 2020-09-10

## 2020-09-10 RX ORDER — KETAMINE HYDROCHLORIDE 100 MG/ML
INJECTION, SOLUTION INTRAMUSCULAR; INTRAVENOUS AS NEEDED
Status: DISCONTINUED | OUTPATIENT
Start: 2020-09-10 | End: 2020-09-10

## 2020-09-10 RX ORDER — ALBUMIN, HUMAN INJ 5% 5 %
12.5 SOLUTION INTRAVENOUS ONCE
Status: COMPLETED | OUTPATIENT
Start: 2020-09-10 | End: 2020-09-10

## 2020-09-10 RX ORDER — GLYCOPYRROLATE 0.2 MG/ML
0.2 INJECTION INTRAMUSCULAR; INTRAVENOUS EVERY 4 HOURS PRN
Status: DISCONTINUED | OUTPATIENT
Start: 2020-09-10 | End: 2020-09-23

## 2020-09-10 RX ORDER — ASPIRIN 325 MG
325 TABLET ORAL DAILY
Status: DISCONTINUED | OUTPATIENT
Start: 2020-09-10 | End: 2020-09-10

## 2020-09-10 RX ORDER — ACETAMINOPHEN 325 MG/1
975 TABLET ORAL EVERY 8 HOURS
Status: DISCONTINUED | OUTPATIENT
Start: 2020-09-10 | End: 2020-09-20

## 2020-09-10 RX ORDER — VANCOMYCIN HYDROCHLORIDE 1 G/20ML
INJECTION, POWDER, LYOPHILIZED, FOR SOLUTION INTRAVENOUS AS NEEDED
Status: DISCONTINUED | OUTPATIENT
Start: 2020-09-10 | End: 2020-09-10 | Stop reason: HOSPADM

## 2020-09-10 RX ORDER — CEFAZOLIN SODIUM 2 G/50ML
2000 SOLUTION INTRAVENOUS EVERY 8 HOURS
Status: COMPLETED | OUTPATIENT
Start: 2020-09-10 | End: 2020-09-11

## 2020-09-10 RX ORDER — POTASSIUM CHLORIDE 14.9 MG/ML
20 INJECTION INTRAVENOUS
Status: DISCONTINUED | OUTPATIENT
Start: 2020-09-10 | End: 2020-09-11

## 2020-09-10 RX ORDER — ASPIRIN 81 MG/1
81 TABLET, CHEWABLE ORAL DAILY
Status: DISCONTINUED | OUTPATIENT
Start: 2020-09-10 | End: 2020-09-28 | Stop reason: HOSPADM

## 2020-09-10 RX ORDER — MAGNESIUM SULFATE HEPTAHYDRATE 40 MG/ML
2 INJECTION, SOLUTION INTRAVENOUS ONCE
Status: COMPLETED | OUTPATIENT
Start: 2020-09-10 | End: 2020-09-10

## 2020-09-10 RX ORDER — BISACODYL 10 MG
10 SUPPOSITORY, RECTAL RECTAL DAILY PRN
Status: DISCONTINUED | OUTPATIENT
Start: 2020-09-10 | End: 2020-09-28 | Stop reason: HOSPADM

## 2020-09-10 RX ORDER — ESMOLOL HYDROCHLORIDE 10 MG/ML
INJECTION INTRAVENOUS AS NEEDED
Status: DISCONTINUED | OUTPATIENT
Start: 2020-09-10 | End: 2020-09-10

## 2020-09-10 RX ORDER — SODIUM CHLORIDE 9 MG/ML
INJECTION, SOLUTION INTRAVENOUS CONTINUOUS PRN
Status: DISCONTINUED | OUTPATIENT
Start: 2020-09-10 | End: 2020-09-10

## 2020-09-10 RX ORDER — POTASSIUM CHLORIDE 14.9 MG/ML
20 INJECTION INTRAVENOUS ONCE AS NEEDED
Status: DISCONTINUED | OUTPATIENT
Start: 2020-09-10 | End: 2020-09-11

## 2020-09-10 RX ORDER — HYDROMORPHONE HCL/PF 1 MG/ML
1 SYRINGE (ML) INJECTION ONCE
Status: COMPLETED | OUTPATIENT
Start: 2020-09-10 | End: 2020-09-10

## 2020-09-10 RX ORDER — PROPOFOL 10 MG/ML
INJECTION, EMULSION INTRAVENOUS AS NEEDED
Status: DISCONTINUED | OUTPATIENT
Start: 2020-09-10 | End: 2020-09-10

## 2020-09-10 RX ORDER — FENTANYL CITRATE 50 UG/ML
50 INJECTION, SOLUTION INTRAMUSCULAR; INTRAVENOUS ONCE
Status: DISCONTINUED | OUTPATIENT
Start: 2020-09-10 | End: 2020-09-11

## 2020-09-10 RX ORDER — HEPARIN SODIUM 1000 [USP'U]/ML
INJECTION, SOLUTION INTRAVENOUS; SUBCUTANEOUS AS NEEDED
Status: DISCONTINUED | OUTPATIENT
Start: 2020-09-10 | End: 2020-09-10

## 2020-09-10 RX ORDER — LORAZEPAM 2 MG/ML
1 INJECTION INTRAMUSCULAR ONCE
Status: COMPLETED | OUTPATIENT
Start: 2020-09-10 | End: 2020-09-10

## 2020-09-10 RX ORDER — ONDANSETRON 2 MG/ML
4 INJECTION INTRAMUSCULAR; INTRAVENOUS EVERY 6 HOURS PRN
Status: DISCONTINUED | OUTPATIENT
Start: 2020-09-10 | End: 2020-09-28 | Stop reason: HOSPADM

## 2020-09-10 RX ORDER — FENTANYL CITRATE 50 UG/ML
50 INJECTION, SOLUTION INTRAMUSCULAR; INTRAVENOUS ONCE
Status: COMPLETED | OUTPATIENT
Start: 2020-09-10 | End: 2020-09-10

## 2020-09-10 RX ORDER — FENTANYL CITRATE 50 UG/ML
50 INJECTION, SOLUTION INTRAMUSCULAR; INTRAVENOUS
Status: DISCONTINUED | OUTPATIENT
Start: 2020-09-10 | End: 2020-09-10

## 2020-09-10 RX ORDER — FENTANYL CITRATE 50 UG/ML
50 INJECTION, SOLUTION INTRAMUSCULAR; INTRAVENOUS
Status: DISCONTINUED | OUTPATIENT
Start: 2020-09-10 | End: 2020-09-11

## 2020-09-10 RX ORDER — ATORVASTATIN CALCIUM 80 MG/1
80 TABLET, FILM COATED ORAL
Status: DISCONTINUED | OUTPATIENT
Start: 2020-09-10 | End: 2020-09-10

## 2020-09-10 RX ORDER — MIDAZOLAM HYDROCHLORIDE 2 MG/2ML
INJECTION, SOLUTION INTRAMUSCULAR; INTRAVENOUS AS NEEDED
Status: DISCONTINUED | OUTPATIENT
Start: 2020-09-10 | End: 2020-09-10

## 2020-09-10 RX ORDER — NEOSTIGMINE METHYLSULFATE 1 MG/ML
INJECTION INTRAVENOUS AS NEEDED
Status: DISCONTINUED | OUTPATIENT
Start: 2020-09-10 | End: 2020-09-10

## 2020-09-10 RX ORDER — ROCURONIUM BROMIDE 10 MG/ML
INJECTION, SOLUTION INTRAVENOUS AS NEEDED
Status: DISCONTINUED | OUTPATIENT
Start: 2020-09-10 | End: 2020-09-10

## 2020-09-10 RX ORDER — CHLORHEXIDINE GLUCONATE 0.12 MG/ML
15 RINSE ORAL 2 TIMES DAILY
Status: DISCONTINUED | OUTPATIENT
Start: 2020-09-10 | End: 2020-09-11

## 2020-09-10 RX ORDER — POLYETHYLENE GLYCOL 3350 17 G/17G
17 POWDER, FOR SOLUTION ORAL DAILY
Status: DISCONTINUED | OUTPATIENT
Start: 2020-09-10 | End: 2020-09-28 | Stop reason: HOSPADM

## 2020-09-10 RX ORDER — SUCCINYLCHOLINE/SOD CL,ISO/PF 100 MG/5ML
SYRINGE (ML) INTRAVENOUS AS NEEDED
Status: DISCONTINUED | OUTPATIENT
Start: 2020-09-10 | End: 2020-09-10

## 2020-09-10 RX ORDER — PANTOPRAZOLE SODIUM 40 MG/1
40 TABLET, DELAYED RELEASE ORAL DAILY
Status: DISCONTINUED | OUTPATIENT
Start: 2020-09-10 | End: 2020-09-28 | Stop reason: HOSPADM

## 2020-09-10 RX ORDER — MAGNESIUM HYDROXIDE 1200 MG/15ML
LIQUID ORAL AS NEEDED
Status: DISCONTINUED | OUTPATIENT
Start: 2020-09-10 | End: 2020-09-10 | Stop reason: HOSPADM

## 2020-09-10 RX ORDER — SODIUM CHLORIDE 450 MG/100ML
20 INJECTION, SOLUTION INTRAVENOUS CONTINUOUS
Status: DISCONTINUED | OUTPATIENT
Start: 2020-09-10 | End: 2020-09-11

## 2020-09-10 RX ORDER — AMINOCAPROIC ACID 250 MG/ML
INJECTION, SOLUTION INTRAVENOUS AS NEEDED
Status: DISCONTINUED | OUTPATIENT
Start: 2020-09-10 | End: 2020-09-10

## 2020-09-10 RX ORDER — AMIODARONE HYDROCHLORIDE 200 MG/1
200 TABLET ORAL EVERY 8 HOURS SCHEDULED
Status: DISCONTINUED | OUTPATIENT
Start: 2020-09-10 | End: 2020-09-18

## 2020-09-10 RX ORDER — CALCIUM CHLORIDE 100 MG/ML
1 INJECTION INTRAVENOUS; INTRAVENTRICULAR ONCE
Status: DISCONTINUED | OUTPATIENT
Start: 2020-09-10 | End: 2020-09-11

## 2020-09-10 RX ORDER — OXYCODONE HYDROCHLORIDE 5 MG/1
5 TABLET ORAL EVERY 4 HOURS PRN
Status: DISCONTINUED | OUTPATIENT
Start: 2020-09-10 | End: 2020-09-10

## 2020-09-10 RX ORDER — ALBUMIN, HUMAN INJ 5% 5 %
SOLUTION INTRAVENOUS
Status: COMPLETED
Start: 2020-09-10 | End: 2020-09-10

## 2020-09-10 RX ORDER — FENTANYL CITRATE 50 UG/ML
INJECTION, SOLUTION INTRAMUSCULAR; INTRAVENOUS AS NEEDED
Status: DISCONTINUED | OUTPATIENT
Start: 2020-09-10 | End: 2020-09-10

## 2020-09-10 RX ORDER — HALOPERIDOL 5 MG/ML
2 INJECTION INTRAMUSCULAR
Status: DISCONTINUED | OUTPATIENT
Start: 2020-09-10 | End: 2020-09-18

## 2020-09-10 RX ORDER — ALBUMIN, HUMAN INJ 5% 5 %
12.5 SOLUTION INTRAVENOUS ONCE
Status: COMPLETED | OUTPATIENT
Start: 2020-09-10 | End: 2020-09-11

## 2020-09-10 RX ORDER — OXYCODONE HYDROCHLORIDE 5 MG/1
2.5 TABLET ORAL EVERY 4 HOURS PRN
Status: DISCONTINUED | OUTPATIENT
Start: 2020-09-10 | End: 2020-09-10

## 2020-09-10 RX ORDER — SODIUM CHLORIDE, SODIUM LACTATE, POTASSIUM CHLORIDE, CALCIUM CHLORIDE 600; 310; 30; 20 MG/100ML; MG/100ML; MG/100ML; MG/100ML
INJECTION, SOLUTION INTRAVENOUS CONTINUOUS PRN
Status: DISCONTINUED | OUTPATIENT
Start: 2020-09-10 | End: 2020-09-10

## 2020-09-10 RX ORDER — FUROSEMIDE 10 MG/ML
40 INJECTION INTRAMUSCULAR; INTRAVENOUS EVERY 6 HOURS PRN
Status: DISCONTINUED | OUTPATIENT
Start: 2020-09-10 | End: 2020-09-11

## 2020-09-10 RX ADMIN — LORAZEPAM 1 MG: 2 INJECTION INTRAMUSCULAR; INTRAVENOUS at 17:06

## 2020-09-10 RX ADMIN — PROTAMINE SULFATE 200 MG: 10 INJECTION, SOLUTION INTRAVENOUS at 10:19

## 2020-09-10 RX ADMIN — ACETAMINOPHEN 975 MG: 325 TABLET, FILM COATED ORAL at 20:41

## 2020-09-10 RX ADMIN — NEOSTIGMINE METHYLSULFATE 5 MG: 1 INJECTION, SOLUTION INTRAVENOUS at 10:43

## 2020-09-10 RX ADMIN — ALBUMIN, HUMAN INJ 5% 12.5 G: 5 SOLUTION at 17:38

## 2020-09-10 RX ADMIN — AMINOCAPROIC ACID 5 G: 250 INJECTION, SOLUTION INTRAVENOUS at 08:30

## 2020-09-10 RX ADMIN — ESMOLOL HYDROCHLORIDE 50 MG: 100 INJECTION, SOLUTION INTRAVENOUS at 07:37

## 2020-09-10 RX ADMIN — ALBUMIN, HUMAN INJ 5% 12.5 G: 5 SOLUTION at 18:56

## 2020-09-10 RX ADMIN — ROCURONIUM BROMIDE 50 MG: 10 INJECTION, SOLUTION INTRAVENOUS at 09:19

## 2020-09-10 RX ADMIN — FENTANYL CITRATE 50 MCG: 50 INJECTION INTRAMUSCULAR; INTRAVENOUS at 13:24

## 2020-09-10 RX ADMIN — CEFAZOLIN SODIUM 2000 MG: 2 SOLUTION INTRAVENOUS at 08:05

## 2020-09-10 RX ADMIN — SODIUM CHLORIDE 20 ML/HR: 0.45 INJECTION, SOLUTION INTRAVENOUS at 11:56

## 2020-09-10 RX ADMIN — AMIODARONE HYDROCHLORIDE 200 MG: 200 TABLET ORAL at 14:54

## 2020-09-10 RX ADMIN — MAGNESIUM SULFATE HEPTAHYDRATE 2 G: 40 INJECTION, SOLUTION INTRAVENOUS at 12:08

## 2020-09-10 RX ADMIN — MUPIROCIN 1 APPLICATION: 20 OINTMENT TOPICAL at 05:50

## 2020-09-10 RX ADMIN — CEFAZOLIN SODIUM 2000 MG: 2 SOLUTION INTRAVENOUS at 10:24

## 2020-09-10 RX ADMIN — CHLORHEXIDINE GLUCONATE 15 ML: 1.2 RINSE ORAL at 18:34

## 2020-09-10 RX ADMIN — MUPIROCIN 1 APPLICATION: 20 OINTMENT TOPICAL at 20:42

## 2020-09-10 RX ADMIN — Medication 100 MG: at 07:34

## 2020-09-10 RX ADMIN — HYDROMORPHONE HYDROCHLORIDE 1 MG: 1 INJECTION, SOLUTION INTRAMUSCULAR; INTRAVENOUS; SUBCUTANEOUS at 12:43

## 2020-09-10 RX ADMIN — PROPOFOL 150 MG: 10 INJECTION, EMULSION INTRAVENOUS at 07:34

## 2020-09-10 RX ADMIN — PROTAMINE SULFATE 10 MG: 10 INJECTION, SOLUTION INTRAVENOUS at 10:18

## 2020-09-10 RX ADMIN — LIDOCAINE HYDROCHLORIDE 100 MG: 20 INJECTION, SOLUTION INTRAVENOUS at 07:34

## 2020-09-10 RX ADMIN — ROCURONIUM BROMIDE 50 MG: 10 INJECTION, SOLUTION INTRAVENOUS at 08:34

## 2020-09-10 RX ADMIN — OXYCODONE HYDROCHLORIDE 15 MG: 10 TABLET ORAL at 16:40

## 2020-09-10 RX ADMIN — SODIUM CHLORIDE, SODIUM LACTATE, POTASSIUM CHLORIDE, AND CALCIUM CHLORIDE: .6; .31; .03; .02 INJECTION, SOLUTION INTRAVENOUS at 08:01

## 2020-09-10 RX ADMIN — FENTANYL CITRATE 50 MCG: 50 INJECTION INTRAMUSCULAR; INTRAVENOUS at 16:51

## 2020-09-10 RX ADMIN — DEXMEDETOMIDINE 0.3 MCG/KG/HR: 100 INJECTION, SOLUTION, CONCENTRATE INTRAVENOUS at 08:53

## 2020-09-10 RX ADMIN — MIDAZOLAM 4 MG: 1 INJECTION INTRAMUSCULAR; INTRAVENOUS at 07:25

## 2020-09-10 RX ADMIN — ALBUMIN (HUMAN) 12.5 G: 12.5 INJECTION, SOLUTION INTRAVENOUS at 17:38

## 2020-09-10 RX ADMIN — SODIUM CHLORIDE 6 MG/HR: 0.9 INJECTION, SOLUTION INTRAVENOUS at 11:20

## 2020-09-10 RX ADMIN — INSULIN HUMAN 5 UNITS: 100 INJECTION, SOLUTION PARENTERAL at 10:08

## 2020-09-10 RX ADMIN — CHLORHEXIDINE GLUCONATE 0.12% ORAL RINSE 15 ML: 1.2 LIQUID ORAL at 05:52

## 2020-09-10 RX ADMIN — GLYCOPYRROLATE 0.8 MG: 0.2 INJECTION, SOLUTION INTRAMUSCULAR; INTRAVENOUS at 10:43

## 2020-09-10 RX ADMIN — SENNOSIDES 8.6 MG: 8.6 TABLET ORAL at 18:33

## 2020-09-10 RX ADMIN — ALBUMIN (HUMAN) 12.5 G: 12.5 INJECTION, SOLUTION INTRAVENOUS at 18:56

## 2020-09-10 RX ADMIN — HEPARIN SODIUM 27000 UNITS: 1000 INJECTION INTRAVENOUS; SUBCUTANEOUS at 08:51

## 2020-09-10 RX ADMIN — FENTANYL CITRATE 1000 MCG: 50 INJECTION, SOLUTION INTRAMUSCULAR; INTRAVENOUS at 08:37

## 2020-09-10 RX ADMIN — AMIODARONE HYDROCHLORIDE 200 MG: 200 TABLET ORAL at 23:58

## 2020-09-10 RX ADMIN — MORPHINE SULFATE 2 MG: 2 INJECTION, SOLUTION INTRAMUSCULAR; INTRAVENOUS at 20:44

## 2020-09-10 RX ADMIN — MUPIROCIN 1 APPLICATION: 20 OINTMENT TOPICAL at 14:54

## 2020-09-10 RX ADMIN — FENTANYL CITRATE 50 MCG: 50 INJECTION INTRAMUSCULAR; INTRAVENOUS at 20:27

## 2020-09-10 RX ADMIN — DEXMEDETOMIDINE 0.3 MCG/KG/HR: 100 INJECTION, SOLUTION, CONCENTRATE INTRAVENOUS at 12:23

## 2020-09-10 RX ADMIN — PHENYLEPHRINE HYDROCHLORIDE 50 MCG/MIN: 10 INJECTION INTRAVENOUS at 10:21

## 2020-09-10 RX ADMIN — DEXMEDETOMIDINE 1.2 MCG/KG/HR: 100 INJECTION, SOLUTION, CONCENTRATE INTRAVENOUS at 13:36

## 2020-09-10 RX ADMIN — KETAMINE HYDROCHLORIDE 50 MG: 100 INJECTION INTRAMUSCULAR; INTRAVENOUS at 07:34

## 2020-09-10 RX ADMIN — SODIUM CHLORIDE 2 UNITS/HR: 9 INJECTION, SOLUTION INTRAVENOUS at 10:08

## 2020-09-10 RX ADMIN — PHENYLEPHRINE HYDROCHLORIDE 20 MCG/MIN: 10 INJECTION INTRAVENOUS at 21:45

## 2020-09-10 RX ADMIN — ROCURONIUM BROMIDE 50 MG: 10 INJECTION, SOLUTION INTRAVENOUS at 07:39

## 2020-09-10 RX ADMIN — CEFAZOLIN SODIUM 2000 MG: 2 SOLUTION INTRAVENOUS at 04:54

## 2020-09-10 RX ADMIN — AMINOCAPROIC ACID 1 MG/HR: 250 INJECTION, SOLUTION INTRAVENOUS at 08:30

## 2020-09-10 RX ADMIN — CHLORHEXIDINE GLUCONATE 15 ML: 1.2 RINSE ORAL at 14:54

## 2020-09-10 RX ADMIN — Medication 12.5 MG: at 05:52

## 2020-09-10 RX ADMIN — CEFAZOLIN SODIUM 2000 MG: 2 SOLUTION INTRAVENOUS at 18:55

## 2020-09-10 RX ADMIN — GABAPENTIN 100 MG: 100 CAPSULE ORAL at 20:42

## 2020-09-10 RX ADMIN — FENTANYL CITRATE 50 MCG: 50 INJECTION INTRAMUSCULAR; INTRAVENOUS at 13:03

## 2020-09-10 RX ADMIN — SODIUM CHLORIDE: 0.9 INJECTION, SOLUTION INTRAVENOUS at 07:30

## 2020-09-10 RX ADMIN — ALPRAZOLAM 1 MG: 0.5 TABLET ORAL at 20:41

## 2020-09-10 RX ADMIN — OXYCODONE HYDROCHLORIDE 15 MG: 10 TABLET ORAL at 12:35

## 2020-09-10 RX ADMIN — Medication 0.2 MG/KG/HR: at 08:53

## 2020-09-10 RX ADMIN — Medication 0.2 MG/KG/HR: at 12:44

## 2020-09-10 NOTE — QUICK NOTE
Patient did not see the patient as she was in the [address] today patient  Continue cefazolin IV    Will re-evaluate in am

## 2020-09-10 NOTE — ANESTHESIA PROCEDURE NOTES
Arterial Line Insertion  Performed by: Yaquelin Brock MD  Authorized by: Yaquelin Brock MD   Consent: Written consent obtained  Risks and benefits: risks, benefits and alternatives were discussed  Consent given by: patient  Patient understanding: patient states understanding of the procedure being performed  Patient consent: the patient's understanding of the procedure matches consent given  Required items: required blood products, implants, devices, and special equipment available  Patient identity confirmed: arm band  Time out: Immediately prior to procedure a "time out" was called to verify the correct patient, procedure, equipment, support staff and site/side marked as required  Preparation: Patient was prepped and draped in the usual sterile fashion    Indications: multiple ABGs and hemodynamic monitoring  Orientation:  Left  Location: radial artery  Sedation:  Patient sedated: yes    Procedure Details:  Needle gauge: 20  Number of attempts: 1    Post-procedure:  Post-procedure: dressing applied  Waveform: good waveform and waveform confirmed  Post-procedure CNS: normal  Patient tolerance: Patient tolerated the procedure well with no immediate complications  Comments: Procedure start 9443  Procedure end 6683

## 2020-09-10 NOTE — OR NURSING
I was unable to find a valid pregnancy test for todays surgery  Dr Jonatan Au ( anesthesia for [facility]) aware of not finding a valid pregnancy test in [facility] on PP4 was called and  she is unable to find one either  Note found from 9/7 regarding patient refusing to take pregnancy test for her dental surgery

## 2020-09-10 NOTE — CONSULTS
Consultation - Eduar Johnson 34 y o  female MRN: [id number]    Unit/Bed#: [facility] 417-01 Encounter: [id number]      Assessment/Plan     Hyperphosphatemia  Assessment & Plan       8/18/2020 06:00 8/28/2020 06:18 8/31/2020 05:40 9/1/2020 21:42 9/9/2020 05:25 9/10/2020 04:49   Phosphorus 5 0 (H) 5 4 (H) 5 8 (H) 5 5 (H) 6 4 (H) 6 5 (H)        8/31/2020 05:41 9/1/2020 21:42 9/5/2020 05:50 9/7/2020 06:09 9/9/2020 05:25 9/10/2020 04:49 9/10/2020 11:33   Calcium 8 7 8 7 8 5 8 2 (L) 9 2 8 8 8 1 (L)     Corrected calcium with albumin is normal to high normal  PTH: 16 5   Vit-D : 15 4    Patient had normal phosphate last month and gradually increasing despite receiving Phoslo started early September, reviewing chart did not showed if patient was receiving phosphate containing laxative, kidney function is normal which exclude decreased phosphate excretion  pseudohypophosphatemia is unlikly as she has no hyperbilirubinemia, hyperlipidemia, and MM is unlikely in a young female with normal calcium and normal kidney function  Hypoparathyroidism is unlikely as calcium is normal to high normal especially with low vit-D, would repeat calcium, phos and PTH together in am along with 1,25 [place] vitamin-D       TFT showed mildly elevated TSH with normal T4, no need for treatment as she has acute illness, will recheck TFT when she resolved        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, Management per primary team    Tricuspid valve regurgitating s/p repair     History of Present Illness     HPI: Eduar Johnson is a 34y o  year old female with PMH of hepatitis C, long standing opiate abuse followed by IV drug abuse with heroin who is admitted for MSSA tricuspid endocarditis and severe tricuspid regurgitation s/p Complex tricuspid valve repair with leaflet resection and repair endocrinology was cosnulted for hyperphosphoremia and low PTH and low VIT-D     Patient is not able to elicit more information for HPI as she is very drowsy as she is on precedex and ketamine  Labs showed calcium of 8 1 corrected with albumin of 2 8 is 9 1  Phosphorus is high raging from 5 0 to 6 5 even with phoslo 667 mg tid since September first      PTH is 16 5 and VIT-D level is 16 5   Patient does not have kidney failure  TSH: 7 620 , ft4 : 0 95   As per note she was not on any vitamin-D or calcium supplementation                   Inpatient consult to Endocrinology     Performed by  Olga Arevalo MD     Authorized by Scarlett Boas, DO              Review of Systems :      Patient is just back from OR on Ketamine and Precedex , very sleepy , not able to elicit ROS  Historical Information   Past Medical History:   Diagnosis Date    Abnormal Pap smear of cervix     Anxiety     Depression     Endocarditis     2018    Hepatitis C     HPV (human papilloma virus) anogenital infection      Past Surgical History:   Procedure Laterality Date    IR PICC LINE PLACEMENT DOUBLE LUMEN  8/26/2020    KNEE SURGERY Left     MOUTH SURGERY      TOOTH EXTRACTION N/A 9/7/2020    Procedure: EXTRACTION TOOTH 32;  Surgeon: Quentin Baron DMD;  Location: [facility];  Service: Maxillofacial     Social History   Social History     Substance and Sexual Activity   Alcohol Use Not Currently    Alcohol/week: 0 0 standard drinks     Social History     Substance and Sexual Activity   Drug Use Yes    Types: Heroin    Comment: last use - one week ago     Social History     Tobacco Use   Smoking Status Former Smoker    Packs/day: 0 25    Types: Cigarettes    Last attempt to quit: 11/9/2016    Years since quitting: 3 8   Smokeless Tobacco Never Used   Tobacco Comment    current everyday smoker per [place]     Family History: History reviewed  No pertinent family history      Meds/Allergies   Current Facility-Administered Medications   Medication Dose Route Frequency Provider Last Rate Last Dose    acetaminophen (TYLENOL) rectal suppository 650 mg  650 mg Rectal Q4H PRN Favian De Los Santos PA-C        acetaminophen (TYLENOL) tablet 650 mg  650 mg Oral Q6H PRN Arun Radulescu, DMD   650 mg at 09/08/20 1729    acetaminophen (TYLENOL) tablet 975 mg  975 mg Oral Q8H Favian De Los Santos PA-C        ALPRAZolam Merlinda Sang) tablet 1 mg  1 mg Oral Q4H PRN Arun Radulescu, DMD   1 mg at 09/09/20 2330    alteplase (CATHFLO) injection 2 mg  2 mg Intracatheter Q12H PRN Arun Radulescu, DMD   2 mg at 08/29/20 1522    amiodarone tablet 200 mg  200 mg Oral [facility] Favian De Los Santos PA-C        aspirin chewable tablet 81 mg  81 mg Oral Daily Favian De Los Santos PA-C        bisacodyl (DULCOLAX) rectal suppository 10 mg  10 mg Rectal Daily PRN Favian De Los Santos PA-C        calcium acetate (PHOSLO) capsule 667 mg  667 mg Oral TID With Meals Arun Radulescu, DMD   667 mg at 09/09/20 1725    calcium carbonate (TUMS) chewable tablet 1,000 mg  1,000 mg Oral Daily PRN Arun Radulescu, DMD   1,000 mg at 08/25/20 2149    calcium chloride 10 % injection 1 g  1 g Intravenous Once Favian De Los Santos PA-C        ceFAZolin (ANCEF) IVPB (premix) 2,000 mg 50 mL  2,000 mg Intravenous Q8H Favian De Los Santos PA-C        chlorhexidine (PERIDEX) 0 12 % oral rinse 15 mL  15 mL Swish & Spit BID Favian De Los Santos PA-C        dexmedetomidine (PRECEDEX) 400 mcg in sodium chloride 0 9 % 100 mL infusion  0 1-1 5 mcg/kg/hr Intravenous Titrated Anthony Conde PA-C 20 4 mL/hr at 09/10/20 1336 1 2 mcg/kg/hr at 09/10/20 1336    dicyclomine (BENTYL) capsule 10 mg  10 mg Oral TID PRN Arun Radulescu, DMD   10 mg at 09/03/20 1151    DULoxetine (CYMBALTA) delayed release capsule 60 mg  60 mg Oral Daily Arun Rebollar DMD   60 mg at 09/09/20 1000    fentanyl citrate (PF) 100 MCG/2ML 50 mcg  50 mcg Intravenous Once Favian De Los Santos PA-C        fentanyl citrate (PF) 100 MCG/2ML 50 mcg  50 mcg Intravenous Q1H PRN Anthony Conde PA-C        furosemide (LASIX) injection 40 mg  40 mg Intravenous Q6H PRN Amaya Rios PA-C        gabapentin (NEURONTIN) capsule 100 mg  100 mg Oral HS Amaya Rios PA-C   100 mg at 09/09/20 2034    glycopyrrolate (ROBINUL) injection 0 2 mg  0 2 mg Intravenous Q4H PRN Amaya Rios PA-C        haloperidol lactate (HALDOL) injection 2 mg  2 mg Intramuscular Q30 Min PRN Amaya Rios PA-C        hydrocortisone 1 % cream   Topical 4x Daily PRN Arun Radulescu, DMD        insulin regular (HumuLIN R,NovoLIN R) 1 Units/mL in sodium chloride 0 9 % 100 mL infusion  0 3-21 Units/hr Intravenous Titrated Amaya Rios PA-C        iohexol (OMNIPAQUE) 240 MG/ML solution 50 mL  50 mL Oral 90 min pre-procedure Arun Radulescu, DMD        ketamine 250 mg (STANDARD CONCENTRATION) IV in sodium chloride 0 9% 250 mL  0 2 mg/kg/hr Intravenous Continuous Jen Cargo MILLIE Judd 13 6 mL/hr at 09/10/20 1244 0 2 mg/kg/hr at 09/10/20 1244    lactated ringers bolus 500 mL  500 mL Intravenous Q30 Min PRN Amaya Rios PA-C        lidocaine (cardiac) injection 100 mg  100 mg Intravenous Q30 Min PRN Amaya Rios PA-C        lidocaine (LIDODERM) 5 % patch 2 patch  2 patch Topical Daily Arun Radulescu, DMD   2 patch at 08/24/20 0607    magnesium citrate (CITROMA) oral solution 296 mL  296 mL Oral Daily PRN Arun Radulescu, DMD        melatonin tablet 6 mg  6 mg Oral HS PRN Arun Radulescu, DMD   6 mg at 09/09/20 2330    methylnaltrexone (RELISTOR) subcutaneous injection 12 mg  12 mg Subcutaneous Q48H PRN Arun Radulescu, DMD        miconazole 2 % cream   Topical BID Arun Radulescu, DMD        morphine injection 2 mg  2 mg Intravenous Q4H PRN Arun Radulescu, DMD   2 mg at 09/09/20 2031    mupirocin (BACTROBAN) 2 % nasal ointment 1 application  1 application Nasal E26B [facility] Amaya Rios PA-C        niCARdipine (CARDENE) 25 mg (STANDARD CONCENTRATION) in sodium chloride 0 9% 250 mL  2 5-15 mg/hr Intravenous Titrated Amaya Rios PA-C   Stopped at 09/10/20 0055  nystatin (MYCOSTATIN) cream   Topical BID Arun Radulescu, DMD        ondansetron (ZOFRAN) injection 4 mg  4 mg Intravenous Q6H PRN Favian De Los Santos PA-C        oxyCODONE (ROXICODONE) IR tablet 15 mg  15 mg Oral Q4H PRN Arun Radulescu, DMD   15 mg at 09/10/20 1235    pantoprazole (PROTONIX) EC tablet 40 mg  40 mg Oral Daily Favian De Los Santos PA-C        polyethylene glycol (MIRALAX) packet 17 g  17 g Oral Daily Favian De Los Santos PA-C        potassium chloride 20 mEq IVPB (premix)  20 mEq Intravenous Once PRN JESUS Patterson-NALLELY        potassium chloride 20 mEq IVPB (premix)  20 mEq Intravenous Q1H PRN JESUS Patterson-NALLELY        potassium chloride 20 mEq IVPB (premix)  20 mEq Intravenous Q30 Min PRN Favian De Los Santos PA-C        saccharomyces boulardii (FLORASTOR) capsule 250 mg  250 mg Oral BID Arun Radulescu, DMD   250 mg at 09/09/20 1725    senna (SENOKOT) tablet 8 6 mg  1 tablet Oral BID Arun Radulescu, DMD   8 6 mg at 09/09/20 1725    sodium chloride infusion 0 45 %  20 mL/hr Intravenous Continuous Favian De Los Santos PA-C 20 mL/hr at 09/10/20 1156 20 mL/hr at 09/10/20 1156     Allergies   Allergen Reactions    Cat Hair Extract     Dog Epithelium     Latex     Pollen Extract        Objective   Vitals: Blood pressure 134/63, pulse 100, temperature 98 6 °F (37 °C), resp  rate (!) 23, height 5' 5" (1 651 m), weight 67 9 kg (149 lb 11 1 oz), SpO2 100 %, not currently breastfeeding      Intake/Output Summary (Last 24 hours) at 9/10/2020 1415  Last data filed at 9/10/2020 1400  Gross per 24 hour   Intake 3200 92 ml   Output 1425 ml   Net 1775 92 ml     Invasive Devices     Peripherally Inserted Central Catheter Line            PICC Line 08/26/20 14 days          Central Venous Catheter Line            CVC Central Lines 09/10/20 Triple less than 1 day    Introducer 09/10/20 less than 1 day          Arterial Line            Arterial Line 09/10/20 Left Radial less than 1 day          Katelin Wires less than 1 day    Pacer Wires less than 1 day          Drain            Chest Tube 1 Right Pleural 32 Fr  less than 1 day    Chest Tube 2 Posterior Mediastinal 32 Fr  less than 1 day    Chest Tube 3 Anterior Mediastinal 32 Fr  less than 1 day    Urethral Catheter Non-latex; Temperature probe 16 Fr  less than 1 day                Physical Exam    The history was obtained from the review of the chart,     Lab Results:       Lab Results   Component Value Date    WBC 5 84 09/09/2020    HGB 10 8 (L) 09/10/2020    HCT 34 4 (L) 09/10/2020    MCV 87 09/09/2020     09/10/2020     Lab Results   Component Value Date/Time    BUN 14 09/10/2020 11:33 AM    K 4 0 09/10/2020 11:33 AM     09/10/2020 11:33 AM    CO2 26 09/10/2020 11:33 AM    CO2 25 09/10/2020 11:31 AM    CREATININE 0 42 (L) 09/10/2020 11:33 AM    AST 47 (H) 09/10/2020 04:49 AM    ALT 28 09/10/2020 04:49 AM    ALB 2 8 (L) 09/10/2020 04:49 AM     Recent Labs     09/09/20  1350   HDL 45   TRIG 266*     No results found for: René Najera  POC Glucose (mg/dl)   Date Value   09/10/2020 110   09/10/2020 123   09/10/2020 88   09/10/2020 98       Imaging Studies: I have personally reviewed pertinent reports  Portions of the record may have been created with voice recognition software

## 2020-09-10 NOTE — ANESTHESIA PROCEDURE NOTES
Procedure Performed: MELISSA Anesthesia  Start Time:  9/10/2020 8:03 AM        Preanesthesia Checklist    Patient identified, IV assessed, risks and benefits discussed, monitors and equipment assessed, procedure being performed at surgeon's request and anesthesia consent obtained  Procedure    Diagnostic Indications for MELISSA:  assessment of surgical repair  Type of MELISSA: complete MELISSA with interpretation  Images Saved: ultrasound permanent image saved  Physician Requesting Echo: Vianey Rosales MD  Location performed: OR  Intubated  Bite block not placed  Heart visualized  Insertion of MELISSA Probe:  Atraumatic  Probe Type:  Epiaortic and multiplane  Modalities:  2D only, color flow mapping, continuous wave Doppler and pulse wave Doppler  Echocardiographic and Doppler Measurements    PREPROCEDURE    LEFT VENTRICLE:  Systolic Function: normal  Ejection Fraction: 55%  Cavity size: normal      RIGHT VENTRICLE:  Systolic Function: normal   Cavity size mildly dilated  No hypertrophy              AORTIC VALVE:  Leaflets: normal and trileaflet  Leaflet motions normal and normal  Stenosis: none  Regurgitation: none  MITRAL VALVE:  Leaflets: normal  Leaflet Motions: normal  Regurgitation: trace  Stenosis: none  TRICUSPID VALVE:  Leaflets: vegetation  Leaflet Motions: prolapse  Stenosis: none  Regurgitation: severe  PULMONIC VALVE:  Leaflets: normal  Regurgitation: mild  Stenosis: none  ASCENDING AORTA:  Size:  normal  Dissection not present  AORTIC ARCH:  Size:  normal  dissection not present  Grade 1: normal to mild intimal thickening  DESCENDING AORTA:  Size: normal  Dissection not present  Grade 1: normal to mild intimal thickening          RIGHT ATRIUM:  Size:  normal      LEFT ATRIUM:  Size: normal      LEFT ATRIAL APPENDAGE:  Size: normal          ATRIAL SEPTUM:  Intra-atrial septal morphology: normal          VENTRICULAR SEPTUM:  Intra-ventricular septum morphology: normal  EPIAORTIC:  Plaque Thickness: 0-5 mm  OTHER FINDINGS:  Pericardium:  normal  Pleural Effusion:  none  POSTPROCEDURE    LEFT VENTRICLE: Unchanged   RIGHT VENTRICLE: Unchanged   AORTIC VALVE: Unchanged   MITRAL VALVE: Unchanged   TRICUSPID VALVE:   Leaflets: ring  Regurgitation: mild  Stenosis: none  Valve/Ring Size: 30 mm  PULMONIC VALVE: Unchanged           ATRIA: Unchanged   AORTA: Unchanged   REMOVAL:  Probe Removal: atraumatic

## 2020-09-10 NOTE — ANESTHESIA PROCEDURE NOTES
Juan Daniel/Alex  Performed by: Carmela Shea MD  Authorized by: Carmela Shea MD     Date/Time: 9/10/2020 8:01 AM  Consent: Written consent obtained  Risks and benefits: risks, benefits and alternatives were discussed  Consent given by: patient  Patient understanding: patient states understanding of the procedure being performed  Patient consent: the patient's understanding of the procedure matches consent given  Procedure consent: procedure consent matches procedure scheduled  Required items: required blood products, implants, devices, and special equipment available  Patient identity confirmed: arm band  Time out: Immediately prior to procedure a "time out" was called to verify the correct patient, procedure, equipment, support staff and site/side marked as required    Indications: vascular access and central pressure monitoring  Location details: right internal jugular  Patient position: Trendelenburg    Sedation:  Patient sedated: yes    Assessment: blood return through all ports and free fluid flow  Preparation: Chloraprep  Skin prep agent dried: skin prep agent completely dried prior to procedure  Sterile barriers: all five maximum sterile barriers used - cap, mask, sterile gown, sterile gloves, and large sterile sheet  Hand hygiene: hand hygiene performed prior to central venous catheter insertion  Ultrasound guidance: yes  ultrasound permanent image saved  Pre-procedure: landmarks identified  Number of attempts: 1  Successful placement: yes  Post-procedure: line sutured and dressing applied  Patient tolerance: Patient tolerated the procedure well with no immediate complications  Comments: Procedure start 0749  Procedure end 0801

## 2020-09-10 NOTE — OP NOTE
OPERATIVE REPORT  PATIENT NAME: Leigh Ann Cohen    :  1990  MRN: [id number]  Pt Location: [facility] OR ROOM 10    SURGERY DATE: 9/10/2020    SURGEON: Martha Vargas MD     ASSISTANT: Quiana Yee PA-C    ADDITIONAL ASSISTANT: None     PREOPERATIVE DIAGNOSIS: Tricuspid valve endocarditis with severe tricuspid regurgitation, History of injection drug abuse  POSTOPERATIVE DIAGNOSES: Tricuspid valve endocarditis with severe tricuspid regurgitation, History of injection drug abuse  NYHA CLASS: 2    CCS CLASS: 2    PROCEDURES: Complex tricuspid valve repair with leaflet resection and repair and 30 mm Medtronic Contour 3D partial ring annuloplasty  ANESTHESIA General endotracheal anesthesia with transesophageal echocardiogram guidance, Dr Chris Klein: 57 minutes  CROSSCLAMP TIME: 43 minutes  Chest tubes: x 3     Pacing wires: A x1 and V x1  TRANSFUSIONS: None     SPECIMENS: Tricuspid valve tissue for culture     ESTIMATED BLOOD LOSS: 200 mL    OPERATIVE TECHNIQUE: The patient was taken to the operating room and placed supine on the operating table  Following the satisfactory induction of general anesthesia and the placement of monitoring lines the patient was prepped and draped in the usual sterile fashion  A time-out procedure was performed  The patient underwent median sternotomy, pericardiotomy, and systemic heparinization  Epiaortic ultrasound was used to evaluate the ascending aorta which was found to be free of significant atheromatous disease  The patient underwent aortic and bicaval cannulation and an antegrade was placed  The patient was initiated on bypass  The ascending aorta was crossclamped  Antegrade [place] cardioplegia was delivered with an excellent arrest       Caval tapes were snared  Rightt atriotomy was performed and the tricuspid valve was exposed with a self-retaining retractor  Thorough valve analysis was performed   There was degenerative (Tomás Classification Type I) tricuspid valve disease with vegetation at the junction of the anterior and posterior leaflets  The vegetation was removed and the leaflet edges were debrided to healthy tissue  The valve was thoroughly irrigated with antibiotic irrigation  I felt that an attempt at valve repair was the best option for this young woman with history of drug abuse  Annuloplasty sutures were placed  The defect between the anterior and posterior leaflets was closed with 2 layers of running 5-0 Prolene suture  A 30 mm Medtronic Contour 3D partial ring was selected and brought to the field  The sutures were passed through the ring  The ring was seated and the sutures were tied down  The valve was tested and found to be competent  The crossclamp was removed  While de-airing the heart the right atriotomy was closed with two layers of running 4-0 Prolene suture  Caval tapes were released  Atrial and ventricular pacing wires were placed  Following a period of reperfusion the patient was weaned from cardiopulmonary bypass and decannulated  Protamine was administered with normalization of the ACT  Hemostasis was confirmed in all fields  Thoracostomy tubes were placed  The sternum was closed with stainless steel wires  The fascia, subdermis and skin were closed with multiple layers of running absorbable suture  As the attending surgeon, I was present and scrubbed for all critical portions of this procedure  There was no qualified surgical resident available  Sponge, needle and instrument counts were reported as correct by the nursing staff  The assistance of a PA was required to complete this case, specifically for assistance with cannulation, decannulation, and exposure during mitral valve repair  Final MELISSA demonstrated normal biventricular function, no tricuspid stenosis, and trace tricuspid regurgitation         SIGNATUREVenecia Mcmahon MD  DATE: September 10, 2020  TIME: 10:52 AM

## 2020-09-10 NOTE — ASSESSMENT & PLAN NOTE
8/18/2020 06:00 8/28/2020 06:18 8/31/2020 05:40 9/1/2020 21:42 9/9/2020 05:25 9/10/2020 04:49   Phosphorus 5 0 (H) 5 4 (H) 5 8 (H) 5 5 (H) 6 4 (H) 6 5 (H)        8/31/2020 05:41 9/1/2020 21:42 9/5/2020 05:50 9/7/2020 06:09 9/9/2020 05:25 9/10/2020 04:49 9/10/2020 11:33   Calcium 8 7 8 7 8 5 8 2 (L) 9 2 8 8 8 1 (L)     Corrected calcium with albumin is normal to high normal  PTH: 16 5   Vit-D : 15 4    Patient had normal phosphate last month and gradually increasing despite receiving Phoslo started early September, reviewing chart did not showed if patient was receiving phosphate containing laxative, kidney function is normal which exclude decreased phosphate excretion  pseudohypophosphatemia is unlikly as she has no hyperbilirubinemia, hyperlipidemia, and MM is unlikely in a young female with normal calcium and normal kidney function    Hypoparathyroidism is unlikely as calcium is normal to high normal especially with low vit-D, would repeat calcium, phos and PTH together in am along with 1,25 OH vitamin-D

## 2020-09-10 NOTE — ANESTHESIA PROCEDURE NOTES
Central Line Insertion  Performed by: Bird Gross MD  Authorized by: Bird Gross MD     Date/Time: 9/10/2020 8:01 AM  Catheter Type:  triple lumen  Consent: Written consent obtained  Risks and benefits: risks, benefits and alternatives were discussed  Consent given by: patient  Patient understanding: patient states understanding of the procedure being performed  Patient consent: the patient's understanding of the procedure matches consent given  Procedure consent: procedure consent matches procedure scheduled  Required items: required blood products, implants, devices, and special equipment available  Patient identity confirmed: arm band  Time out: Immediately prior to procedure a "time out" was called to verify the correct patient, procedure, equipment, support staff and site/side marked as required    Indications: vascular access and central pressure monitoring  Location details: right internal jugular  Patient position: Trendelenburg    Sedation:  Patient sedated: yes    Assessment: blood return through all ports and free fluid flow  Preparation: Chloraprep  Skin prep agent dried: skin prep agent completely dried prior to procedure  Sterile barriers: all five maximum sterile barriers used - cap, mask, sterile gown, sterile gloves, and large sterile sheet  Hand hygiene: hand hygiene performed prior to central venous catheter insertion  Ultrasound guidance: yes  sterile gel and probe cover used in ultrasound-guided central venous catheter insertionultrasound permanent image saved  Pre-procedure: landmarks identified  Number of attempts: 1  Successful placement: yes  Post-procedure: chlorhexidine patch applied,  line sutured and dressing applied  Patient tolerance: Patient tolerated the procedure well with no immediate complications  Comments: Procedure start 0749  Procedure end 0801

## 2020-09-10 NOTE — INTERVAL H&P NOTE
Vitals:    09/09/20 1900   BP: 134/63   Pulse: 103   Resp: 18   Temp: 98 3 °F (36 8 °C)   SpO2: 96%         H&P reviewed  After examining the patient I find no changes in the patients condition since the H&P was completed  Plan for TV repair/replacement, possible epicardial leads  Preoperative Beta Blocker: indicated  Anticipated Length of Stay: Patient will be admitted on an inpatient basis with an anticipated length of stay of grater than 2 midnights  Justification for Hospital StaY: Post surgical recovery following open heart surgery        Margo Bell MD  09/10/20  7:24 AM

## 2020-09-10 NOTE — ANESTHESIA PREPROCEDURE EVALUATION
Procedure:  REPLACEMENT VALVE TRICUSPID (N/A Chest)    Relevant Problems   CARDIO   (+) DVT of axillary vein, acute left (HCC)   (+) Septic embolism (HCC)   (+) Tricuspid regurgitation      GI/HEPATIC   (+) Chronic hepatitis C virus infection (HCC)      /RENAL   (+) Acute pyelonephritis      HEMATOLOGY   (+) Anemia      NEURO/PSYCH   (+) Chronic, continuous use of opioids      PULMONARY   (+) Loculated pleural effusion        Physical Exam    Airway    Mallampati score: II         Dental   No notable dental hx     Cardiovascular      Pulmonary      Other Findings        Anesthesia Plan  ASA Score- 4     Anesthesia Type- general with ASA Monitors  Additional Monitors: pulmonary artery catheter, central venous line and arterial line  Airway Plan: ETT  Comment: I, Dr Shelby Lopez, the attending physician, have personally seen and evaluated the patient prior to anesthetic care  I have reviewed the pre-anesthetic record, and other medical records if appropriate to the anesthetic care  If a CRNA is involved in the case, I have reviewed the CRNA assessment, if present, and agree  The patient is in a suitable condition to proceed with my formulated anesthetic plan          Plan Factors-    Chart reviewed  Induction- intravenous  Postoperative Plan-     Informed Consent- Anesthetic plan and risks discussed with patient  I personally reviewed this patient with the CRNA  Discussed and agreed on the Anesthesia Plan with the CRNA  Patrick Child

## 2020-09-10 NOTE — ANESTHESIA POSTPROCEDURE EVALUATION
Post-Op Assessment Note    CV Status:  Stable    Pain management: adequate     Mental Status:  Alert   Hydration Status:  Stable   PONV Controlled:  None   Airway Patency:  Patent  Airway: intubated      Post Op Vitals Reviewed: Yes      Staff: Anesthesiologist   Comments: patient may need sugamadex or time prior to extubation        No complications documented      BP      Temp      Pulse     Resp      SpO2

## 2020-09-10 NOTE — PROGRESS NOTES
Unable to remove patient's two rings prior to surgery  Ring cutter was used to cut both rings off  Rings then given to patient's father prior to going to surgery

## 2020-09-10 NOTE — PROGRESS NOTES
Progress Note - Nephrology   Debbie Crenshaw 34 y o  female MRN: [id number]  Unit/Bed#: [facility] 417-01 Encounter: [id number]      Assessment / Plan:  1  Hyperphosphatemia-phos 6 4 despite PhosLo 1 tab TID  -will place on low phosphorus diet  -PTH suppressed with low vitamin D level noted  Calcium high normal at 10  -CK normal  -lipid panel shows high Tg but otherwise normal  -TSH high at 7 6, T4 0 95  -would avoid use of phosphate containing enemas/avoid fleet enemas  -f/u am phos, am CMP  -high normal calcium noted at 10 when corrected for albumin  -will consult endocrinology regarding low PTH/high phos high normal calcium and elevated TSH  -of note, normal renal function     2  Anemia - Hgb 10 8, iron panel shows low iron sat at 17%, SPEP/UPEP pending, monitor CBC  No FREDDIE in light of acute left DVT      3  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 - s/p tricuspid valve repair and leaflet resection along with partial ring annuloplasty today, on IV ABx per ID/primary team, CT surgery on board  On cardene gtt for BP management per CT surgery  Avoid relative hypotension       4  Bipolar 1 disorder - management per psych        Subjective:   Patient is seen postoperatively today  She is status post tricuspid valve repair  Unable to elicit HPI or review of systems as patient is intubated on Cardene drip on 40% FiO2  Objective:     Vitals: Blood pressure 134/63, pulse (!) 108, temperature 97 7 °F (36 5 °C), resp  rate (!) 23, height 5' 5" (1 651 m), weight 67 9 kg (149 lb 11 1 oz), SpO2 98 %, not currently breastfeeding  ,Body mass index is 24 91 kg/m²  Temp (24hrs), Av 2 °F (36 8 °C), Min:97 7 °F (36 5 °C), Max:98 4 °F (36 9 °C)      Weight (last 2 days)     Date/Time   Weight    20 1900   67 9 (149 69)                Intake/Output Summary (Last 24 hours) at 9/10/2020 1345  Last data filed at 9/10/2020 1200  Gross per 24 hour   Intake 2502 85 ml   Output 1045 ml Net 1457 85 ml     I/O last 24 hours: In: 2502 9 [I V :2052 9]  Out: 6346 [Urine:570; Blood:450; Chest Tube:25]        Physical Exam:   Physical Exam  Vitals signs and nursing note reviewed  Constitutional:       General: She is not in acute distress  Appearance: She is well-developed  She is not diaphoretic  HENT:      Head: Normocephalic and atraumatic  Mouth/Throat:      Pharynx: No oropharyngeal exudate  Eyes:      General: No scleral icterus  Right eye: No discharge  Left eye: No discharge  Comments: +ETT   Neck:      Musculoskeletal: Normal range of motion and neck supple  Thyroid: No thyromegaly  Cardiovascular:      Rate and Rhythm: Normal rate and regular rhythm  Heart sounds: No murmur  Comments: +chest tubes  Pulmonary:      Effort: Pulmonary effort is normal  No respiratory distress  Breath sounds: Normal breath sounds  No wheezing  Abdominal:      General: Bowel sounds are normal  There is no distension  Palpations: Abdomen is soft  Genitourinary:     Comments: +yuen  Musculoskeletal:         General: No swelling  Skin:     General: Skin is warm and dry  Findings: No rash  Neurological:      General: No focal deficit present  Mental Status: She is alert  Motor: No abnormal muscle tone        Comments: awake   Psychiatric:      Comments: anxious         Invasive Devices     Peripherally Inserted Central Catheter Line            PICC Line 08/26/20 14 days          Central Venous Catheter Line            CVC Central Lines 09/10/20 Triple less than 1 day    Introducer 09/10/20 less than 1 day          Arterial Line            Arterial Line 09/10/20 Left Radial less than 1 day          Line            Pacer Wires less than 1 day    Pacer Wires less than 1 day          Drain            Chest Tube 1 Right Pleural 32 Fr  less than 1 day    Chest Tube 2 Posterior Mediastinal 32 Fr  less than 1 day    Chest Tube 3 Anterior Mediastinal 32 Fr  less than 1 day    NG/OG/Enteral Tube Orogastric 18 Fr Center mouth less than 1 day    Urethral Catheter Non-latex; Temperature probe 16 Fr  less than 1 day                Medications:    Scheduled Meds:  Current Facility-Administered Medications   Medication Dose Route Frequency Provider Last Rate    acetaminophen  650 mg Rectal Q4H PRN Gila Angel PA-C      acetaminophen  650 mg Oral Q6H PRN Arun Radulescu, DMD      acetaminophen  975 mg Oral Q8H Gila Angel PA-C      ALPRAZolam  1 mg Oral Q4H PRN Arun Radulescu, DMD      alteplase  2 mg Intracatheter Q12H PRN [facility] Radulescu, DMD      amiodarone  200 mg Oral [facility] Gila Angel PA-C      aspirin  81 mg Oral Daily Gila Angel PA-C      bisacodyl  10 mg Rectal Daily PRN Gila Angel PA-C      calcium acetate  667 mg Oral TID With Meals Arun Radulescu, DMD      calcium carbonate  1,000 mg Oral Daily PRN [facility] Radulescu, DMD      calcium chloride  1 g Intravenous Once Gila Angel PA-C      cefazolin  2,000 mg Intravenous Q8H Gila Angel PA-C      chlorhexidine  15 mL Swish & Spit BID Gila Angel PA-C      dexmedetomidine  0 1-1 5 mcg/kg/hr Intravenous Titrated Melvi Salazar PA-C 1 2 mcg/kg/hr (09/10/20 1336)    dicyclomine  10 mg Oral TID PRN Arun Radulescu, DMD      DULoxetine  60 mg Oral Daily [facility] Radulescu, DMD      fentanyl citrate (PF)  50 mcg Intravenous Once Gila Angel PA-C      fentanyl citrate (PF)  50 mcg Intravenous Q1H PRN Melvi Salazar PA-C      furosemide  40 mg Intravenous Q6H PRN Gila Angel PA-C      gabapentin  100 mg Oral HS Gila Angel PA-C      glycopyrrolate  0 2 mg Intravenous Q4H PRN Gila Angel PA-C      haloperidol lactate  2 mg Intramuscular Q30 Min PRN Gila Angel PA-C      hydrocortisone   Topical 4x Daily PRN Arun Radulescu, DMD      insulin regular (HumuLIN R,NovoLIN R) infusion  0 3-21 Units/hr Intravenous Titrated Gila Angel PA-C      iohexol  50 mL Oral 90 min pre-procedure Arun Radulescu, DMD      ketamine  0 2 mg/kg/hr Intravenous Continuous Candance Beltran Dutch, PA-C 0 2 mg/kg/hr (09/10/20 1244)    lactated ringers  500 mL Intravenous Q30 Min PRN [address] Form, PA-C      lidocaine (cardiac)  100 mg Intravenous Q30 Min PRN [address] Form, PA-C      lidocaine  2 patch Topical Daily Arun Radulescu, DMD      magnesium citrate  296 mL Oral Daily PRN Arun Radulescu, DMD      magnesium sulfate  2 g Intravenous Once [address] Form, PA-C      melatonin  6 mg Oral HS PRN Arun Radulescu, DMD      methylnaltrexone bromide  12 mg Subcutaneous Q48H PRN Arun Radulescu, DMD      miconazole   Topical BID Arun Radulescu, DMD      morphine injection  2 mg Intravenous Q4H PRN Arun Radulescu, DMD      mupirocin  1 application Nasal L70H [address] Form, PA-C      niCARdipine  2 5-15 mg/hr Intravenous Titrated Ave Form, PA-C Stopped (09/10/20 1216)    nystatin   Topical BID Arun Radulescu, DMD      ondansetron  4 mg Intravenous Q6H PRN Ave Form, PA-C      oxyCODONE  15 mg Oral Q4H PRN Arun Radulescu, DMD      pantoprazole  40 mg Oral Daily [address] Form, PA-C      polyethylene glycol  17 g Oral Daily [address] Form, PA-C      potassium chloride  20 mEq Intravenous Once PRN Ave Form, PA-C      potassium chloride  20 mEq Intravenous Q1H PRN [address] Form, PA-C      potassium chloride  20 mEq Intravenous Q30 Min PRN [address] Form, PA-C      saccharomyces boulardii  250 mg Oral BID Arun Radulescu, DMD      senna  1 tablet Oral BID Arun Radulescu, DMD      sodium chloride  20 mL/hr Intravenous Continuous Ave Form, PA-C 20 mL/hr (09/10/20 1156)       PRN Meds:   acetaminophen    acetaminophen    ALPRAZolam    alteplase    bisacodyl    calcium carbonate    dicyclomine    fentanyl citrate (PF)    furosemide    glycopyrrolate    haloperidol lactate    hydrocortisone    lactated ringers   lidocaine (cardiac)    magnesium citrate    melatonin    methylnaltrexone bromide    morphine injection    ondansetron    oxyCODONE    potassium chloride    potassium chloride    potassium chloride    Continuous Infusions:dexmedetomidine, 0 1-1 5 mcg/kg/hr, Last Rate: 1 2 mcg/kg/hr (09/10/20 1336)  insulin regular (HumuLIN R,NovoLIN R) infusion, 0 3-21 Units/hr  ketamine, 0 2 mg/kg/hr, Last Rate: 0 2 mg/kg/hr (09/10/20 1244)  niCARdipine, 2 5-15 mg/hr, Last Rate: Stopped (09/10/20 1216)  sodium chloride, 20 mL/hr, Last Rate: 20 mL/hr (09/10/20 1156)            LAB RESULTS:      Results from last 7 days   Lab Units 09/10/20  1133 09/10/20  1131 09/10/20  1036 09/10/20  1005 09/10/20  0956 09/10/20  0936 09/10/20  0932 09/10/20  0927 09/10/20  0858  09/10/20  0449 09/09/20  0525 09/07/20  0610 09/07/20  0609 09/05/20  0550   WBC Thousand/uL  --   --   --   --   --   --   --   --   --   --   --  5 84 5 48  --  5 98   HEMOGLOBIN g/dL 10 8*  --   --   --   --   --   --   --   --   --   --  10 4* 9 2*  --  9 5*   I STAT HEMOGLOBIN g/dl  --  9 9* 8 8*  --  8 2* 8 2* 8 2* 7 1* 10 2*   < >  --   --   --   --   --    HEMATOCRIT % 34 4*  --   --   --   --   --   --   --   --   --   --  33 7* 29 9*  --  30 4*   HEMATOCRIT, ISTAT %  --  29* 26*  --  24* 24* 24* 21* 30*   < >  --   --   --   --   --    PLATELETS Thousands/uL 269  --   --  277  --   --   --   --   --   --   --  304 283  --  273   NEUTROS PCT %  --   --   --   --   --   --   --   --   --   --   --  42* 32*  --  31*   LYMPHS PCT %  --   --   --   --   --   --   --   --   --   --   --  36 42  --  46*   MONOS PCT %  --   --   --   --   --   --   --   --   --   --   --  8 7  --  9   EOS PCT %  --   --   --   --   --   --   --   --   --   --   --  14* 17*  --  13*   POTASSIUM mmol/L 4 0  --   --   --   --   --   --   --   --   --  4 1 4 0  --  4 3 3 7   CHLORIDE mmol/L 108  --   --   --   --   --   --   --   --   --  105 105  --  109* 107   CO2 mmol/L 26  -- --   --   --   --   --   --   --   --  29 29  --  28 28   CO2, I-STAT mmol/L  --  25 27  --  27 26 30 26 25   < >  --   --   --   --   --    BUN mg/dL 14  --   --   --   --   --   --   --   --   --  17 15  --  15 17   CREATININE mg/dL 0 42*  --   --   --   --   --   --   --   --   --  0 47* 0 40*  --  0 38* 0 45*   CALCIUM mg/dL 8 1*  --   --   --   --   --   --   --   --   --  8 8 9 2  --  8 2* 8 5   ALK PHOS U/L  --   --   --   --   --   --   --   --   --   --  174* 189*  --  192*  --    ALT U/L  --   --   --   --   --   --   --   --   --   --  28 30  --  39  --    AST U/L  --   --   --   --   --   --   --   --   --   --  47* 46*  --  73*  --    GLUCOSE, ISTAT mg/dl  --  88 172*  --  220* 147* 140 128 124   < >  --   --   --   --   --    MAGNESIUM mg/dL  --   --   --   --   --   --   --   --   --   --   --  2 2  --   --   --    PHOSPHORUS mg/dL  --   --   --   --   --   --   --   --   --   --  6 5* 6 4*  --   --   --     < > = values in this interval not displayed  CUTURES:  Lab Results   Component Value Date    BLOODCX No Growth After 5 Days  08/26/2020    BLOODCX No Growth After 5 Days  08/15/2020    BLOODCX Staphylococcus aureus (A) 08/13/2020    BLOODCX Staphylococcus aureus (A) 08/10/2020    BLOODCX Staphylococcus aureus (A) 08/10/2020    BLOODCX Staphylococcus coagulase negative (A) 08/10/2020    BLOODCX No Growth After 5 Days  07/21/2020    URINECX 40,000-49,000 cfu/ml  07/15/2020    URINECX No Growth <1000 cfu/mL 11/20/2016                 Portions of the record may have been created with voice recognition software  Occasional wrong word or "sound a like" substitutions may have occurred due to the inherent limitations of voice recognition software  Read the chart carefully and recognize, using context, where substitutions have occurred  If you have any questions, please contact the dictating provider

## 2020-09-10 NOTE — CONSULTS
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  She had left AMA on 2 separate occasions for evaluation  Blood cultures were NGTD from 8/26 and she was maintained on cefazolin via PICC through 9/26 to complete 6 week course  Most recent imaging shows evidence of septic pulmonary emboli  She was evaluated by [facility] who recommended oeprative management  She presents following TV repair with leaflet resection and repair with 30mm annuloplasty ring  History obtained from chart review due to patient being intubated and sedated  ---------------------------------------------------------------------------------------------------------------------------------------------------------------------  Impressions:  1  Tricuspid valve endocarditis s/p TV repair with leaflet resection and repair with 30mm annuloplasty ring  2  Bipolar 1 disorder  3  B/l pulmonary septic emboli   4  Right hip pain  5  Hyperphosphatemia  6  Anemia  7  Hep c  8  Depression/anxiety    Plan:    Neuro: D/C continuous sedation  Acute pain service for pain management, currently on ketamine and precedex PRN oxycodone  Trend neuro exam   Delirium precautions  CV: MAP goal >65  SBP goal <130  CI>2 2  Post-op medications: None  Volume resuscitation as needed  Monitor rhythm on telemetry  Epicardial pacing wires for pacing needs  Intra-op MELISSA LVEF 55%  Lung: Check STAT post-op ABG and CXR  Wean vent with spontaneous breathing trial with goal to extubate today       GI: GI prophylaxis with PPI  Bowel regimen  Zofran PRN for nausea  FEN: NPO  Replenish K >4 0, mag >2 0 and calcium >7 0  : Check STAT post-op BMP  Dominguez in place  Monitor UOP with goal >0 5cc/kg/hour  Lasix versus volume resuscitate as needed depending on hemodynamics and volume status  ID: Prophylactic post-op abx  Maintain normothermia  Trend temps  Heme: Check STAT post-op H/H and platelets  Monitor incision site, invasive lines, and chest tube outputs for bleeding  Send coag panel if needed  Endo: Insulin gtt for blood sugar control  Disposition: ICU Care   ---------------------------------------------------------------------------------------------------------------------------------------------------------------------  Historical Information   Past Medical History:   Diagnosis Date    Abnormal Pap smear of cervix     Anxiety     Depression     Endocarditis     2018    Hepatitis C     HPV (human papilloma virus) anogenital infection      Past Surgical History:   Procedure Laterality Date    IR PICC LINE PLACEMENT DOUBLE LUMEN  8/26/2020    KNEE SURGERY Left     MOUTH SURGERY      TOOTH EXTRACTION N/A 9/7/2020    Procedure: EXTRACTION TOOTH 32;  Surgeon: Eduardo Pham DMD;  Location: [facility];  Service: Maxillofacial     Social History   Social History     Substance and Sexual Activity   Alcohol Use Not Currently    Alcohol/week: 0 0 standard drinks     Social History     Substance and Sexual Activity   Drug Use Yes    Types: Heroin    Comment: last use - one week ago     Social History     Tobacco Use   Smoking Status Former Smoker    Packs/day: 0 25    Types: Cigarettes    Last attempt to quit: 11/9/2016    Years since quitting: 3 8   Smokeless Tobacco Never Used   Tobacco Comment    current everyday smoker per [place]     History reviewed  No pertinent family history    I have reviewed this patient's family history and commented on sigificant items within the HPI    ROS: ROS unable to be obtained due to patient being intubated and sedated  Allergies:    Allergies   Allergen Reactions    Cat Hair Extract     Dog Epithelium     Latex     Pollen Extract        Home Medications:   Prior to Admission medications    Not on File     Inpatient Medications:  Scheduled Meds:  Current Facility-Administered Medications   Medication Dose Route Frequency Provider Last Rate    acetaminophen  650 mg Rectal Q4H PRN Autumn AriasMILLIE      acetaminophen  650 mg Oral Q6H PRN Arun Radulescu, DMD      acetaminophen  975 mg Oral Q8H Autumn AriasMILLIE      ALPRAZolam  1 mg Oral Q4H PRN Arun Radulescu, DMD      alteplase  2 mg Intracatheter Q12H PRN Arun Radulescu, DMD      amiodarone  200 mg Oral [facility] Autumn AriasMILLIE      aspirin  81 mg Oral Daily Autumn AriasMILLIE      bisacodyl  10 mg Rectal Daily PRN Autumn AriasMILLIE      calcium acetate  667 mg Oral TID With Meals Arun Radulescu, DMD      calcium carbonate  1,000 mg Oral Daily PRN [facility] Radulescu, DMD      calcium chloride  1 g Intravenous Once Autumn AriasMILLIE      cefazolin  2,000 mg Intravenous Q8H Autumn AriasMILLIE      chlorhexidine  15 mL Swish & Spit BID Autumn AriasMILLIE      dexmedetomidine  0 1-0 7 mcg/kg/hr Intravenous Titrated Sharon MILLIE Tiwari      dicyclomine  10 mg Oral TID PRN [facility] Radulescu, DMD      DULoxetine  60 mg Oral Daily [facility] Radulescu, DMD      fentanyl citrate (PF)  50 mcg Intravenous Once Autumn AriasMILLIE      fentanyl citrate (PF)  50 mcg Intravenous Q1H PRN Autumn AriasMILLIE      furosemide  40 mg Intravenous Q6H PRN Autumn Arias, PA-NALLELY      gabapentin  100 mg Oral HS Autumn AriasMILLIE      glycopyrrolate  0 2 mg Intravenous Q4H PRN Autumn AriasMILLIE      haloperidol lactate  2 mg Intramuscular Q30 Min PRN Autumn Arias, PA-NALLELY      hydrocortisone   Topical 4x Daily PRN Arun Radulescu, DMD      insulin regular (HumuLIN R,NovoLIN R) infusion  0 3-21 Units/hr Intravenous Titrated Estefania Do, PA-C      iohexol  50 mL Oral 90 min pre-procedure Arun Radulescu, DMD      lactated ringers  500 mL Intravenous Q30 Min PRN Estefania Do, PA-C      lidocaine (cardiac)  100 mg Intravenous Q30 Min PRN Estefania Do, PA-C      lidocaine  2 patch Topical Daily Arun Radulescu, DMD      magnesium citrate  296 mL Oral Daily PRN Arun Radulescu, DMD      magnesium sulfate  2 g Intravenous Once Estefania Do, PA-C      melatonin  6 mg Oral HS PRN Arun Radulescu, DMD      methylnaltrexone bromide  12 mg Subcutaneous Q48H PRN Arun Radulescu, DMD      miconazole   Topical BID Arun Radulescu, DMD      morphine injection  2 mg Intravenous Q4H PRN Arun Radulescu, DMD      mupirocin  1 application Nasal [address] Estefania Do, PA-C      niCARdipine  2 5-15 mg/hr Intravenous Titrated Estefania Do, PA-C Stopped (09/10/20 1216)    nystatin   Topical BID Arun Radulescu, DMD      ondansetron  4 mg Intravenous Q6H PRN Estefania Do, PA-C      oxyCODONE  15 mg Oral Q4H PRN Arun Radulescu, DMD      oxyCODONE  2 5 mg Oral Q4H PRN Estefania Do, PA-C      oxyCODONE  5 mg Oral Q4H PRN Estefania Do, PA-C      pantoprazole  40 mg Oral Daily Estefania Do, PA-C      polyethylene glycol  17 g Oral Daily Estefania Do, PA-C      potassium chloride  20 mEq Intravenous Once PRN Estefania Do, PA-C      potassium chloride  20 mEq Intravenous Q1H PRN Estefania Do, PA-C      potassium chloride  20 mEq Intravenous Q30 Min PRN Estefania Do, PA-C      saccharomyces boulardii  250 mg Oral BID Arun Radulescu, DMD      senna  1 tablet Oral BID Arun Radulescu, DMD      sodium chloride  20 mL/hr Intravenous Continuous Estefania Do, PA-C 20 mL/hr (09/10/20 1156)     Continuous Infusions:dexmedetomidine, 0 1-0 7 mcg/kg/hr  insulin regular (HumuLIN R,NovoLIN R) infusion, 0 3-21 Units/hr  niCARdipine, 2 5-15 mg/hr, Last Rate: Stopped (09/10/20 1216)  sodium chloride, 20 mL/hr, Last Rate: 20 mL/hr (09/10/20 1156)      PRN Meds:  acetaminophen, 650 mg, Q4H PRN  acetaminophen, 650 mg, Q6H PRN  ALPRAZolam, 1 mg, Q4H PRN  alteplase, 2 mg, Q12H PRN  bisacodyl, 10 mg, Daily PRN  calcium carbonate, 1,000 mg, Daily PRN  dicyclomine, 10 mg, TID PRN  fentanyl citrate (PF), 50 mcg, Q1H PRN  furosemide, 40 mg, Q6H PRN  glycopyrrolate, 0 2 mg, Q4H PRN  haloperidol lactate, 2 mg, Q30 Min PRN  hydrocortisone, , 4x Daily PRN  lactated ringers, 500 mL, Q30 Min PRN  lidocaine (cardiac), 100 mg, Q30 Min PRN  magnesium citrate, 296 mL, Daily PRN  melatonin, 6 mg, HS PRN  methylnaltrexone bromide, 12 mg, Q48H PRN  morphine injection, 2 mg, Q4H PRN  ondansetron, 4 mg, Q6H PRN  oxyCODONE, 15 mg, Q4H PRN  oxyCODONE, 2 5 mg, Q4H PRN  oxyCODONE, 5 mg, Q4H PRN  potassium chloride, 20 mEq, Once PRN  potassium chloride, 20 mEq, Q1H PRN  potassium chloride, 20 mEq, Q30 Min PRN      ---------------------------------------------------------------------------------------------------------------------------------------------------------------------  Vitals:   Vitals:    09/10/20 1128 09/10/20 1130 09/10/20 1145 09/10/20 1200   BP:       BP Location:       Pulse:  94 (!) 110 (!) 108   Resp:  (!) 38 (!) 31 (!) 23   Temp:    97 7 °F (36 5 °C)   TempSrc:       SpO2: 99% 99% 97% 98%   Weight:       Height:         Arterial Line:  Arterial Line BP: 106/58  Arterial Line MAP (mmHg): 76 mmHg    Temperature: Temp (24hrs), Av 2 °F (36 8 °C), Min:97 7 °F (36 5 °C), Max:98 4 °F (36 9 °C)  Current: Temperature: 97 7 °F (36 5 °C)    Weights: IBW: 57 kg  Body mass index is 24 91 kg/m²      Hemodynamic Monitoring:  PAP: PAP: 21/9, CVP: CVP (mean): 6 mmHg, CO: CO (L/min): 7 3 L/min, CI: CI (L/min/m2): 4 1 L/min/m2, SVR: SVR (dyne*sec)/cm5: 766 (dyne*sec)/cm5    Ventilator Settings:  Respiratory    Lab Data (Last 4 hours)    None         O2/Vent Data (Last 4 hours)    None              Labs: Results from last 7 days   Lab Units 09/10/20  1133 09/10/20  1131 09/10/20  1036 09/10/20  1005  20  0525 20  0610 20  0550   WBC Thousand/uL  --   --   --   --   --  5 84 5 48 5 98   HEMOGLOBIN g/dL 10 8*  --   --   --   --  10 4* 9 2* 9 5*   I STAT HEMOGLOBIN g/dl  --  9 9* 8 8*  --    < >  --   --   --    HEMATOCRIT % 34 4*  --   --   --   --  33 7* 29 9* 30 4*   HEMATOCRIT, ISTAT %  --  29* 26*  --    < >  --   --   --    PLATELETS Thousands/uL 269  --   --  277  --  304 283 273   NEUTROS PCT %  --   --   --   --   --  42* 32* 31*   MONOS PCT %  --   --   --   --   --  8 7 9    < > = values in this interval not displayed  Results from last 7 days   Lab Units 09/10/20  1131 09/10/20  1036 09/10/20  0956  09/10/20  0449 20  0525 20  0609   SODIUM mmol/L  --   --   --   --  138 140 140   POTASSIUM mmol/L  --   --   --   --  4 1 4 0 4 3   CHLORIDE mmol/L  --   --   --   --  105 105 109*   CO2 mmol/L  --   --   --   --  29 29 28   CO2, I-STAT mmol/L 25 27 27   < >  --   --   --    BUN mg/dL  --   --   --   --  17 15 15   CREATININE mg/dL  --   --   --   --  0 47* 0 40* 0 38*   CALCIUM mg/dL  --   --   --   --  8 8 9 2 8 2*   ALK PHOS U/L  --   --   --   --  174* 189* 192*   ALT U/L  --   --   --   --  28 30 39   AST U/L  --   --   --   --  47* 46* 73*   GLUCOSE, ISTAT mg/dl 88 172* 220*   < >  --   --   --     < > = values in this interval not displayed  Post-op AB 34/43/95/-/%  Post-op CXR: B/l apical pneumothorax noted  I have personally reviewed pertinent films in PACS  Post-op EKG: Sinus rhythm This was personally reviewed by myself  Physical Exam  Constitutional:       General: She is not in acute distress  Appearance: She is well-developed  HENT:      Head: Normocephalic  Eyes:      Pupils: Pupils are equal, round, and reactive to light  Neck:      Vascular: No JVD        Comments: +CVC  Cardiovascular:      Rate and Rhythm: Normal rate and regular rhythm  Heart sounds: No friction rub  Pulmonary:      Effort: No respiratory distress  Breath sounds: No wheezing or rales  Abdominal:      General: There is no distension  Palpations: Abdomen is soft  Skin:     General: Skin is warm and dry  Neurological:      Comments: Intubated and sedated        Invasive lines and devices: Invasive Devices     Peripherally Inserted Central Catheter Line            PICC Line 08/26/20 14 days          Central Venous Catheter Line            CVC Central Lines 09/10/20 Triple less than 1 day    Introducer 09/10/20 less than 1 day          Arterial Line            Arterial Line 09/10/20 Left Radial less than 1 day          Line            Pacer Wires less than 1 day    Pacer Wires less than 1 day          Drain            Chest Tube 1 Right Pleural 32 Fr  less than 1 day    Chest Tube 2 Posterior Mediastinal 32 Fr  less than 1 day    Chest Tube 3 Anterior Mediastinal 32 Fr  less than 1 day    NG/OG/Enteral Tube Orogastric 18 Fr Center mouth less than 1 day    Urethral Catheter Non-latex; Temperature probe 16 Fr  less than 1 day          Airway            ETT  Hi-Lo; Cuffed;Oral 7 mm less than 1 day              ---------------------------------------------------------------------------------------------------------------------------------------------------------------------  Care Time Delivered:   No Critical Care time spent     SIGNATURE: Dimas Adams PA-C  DATE: September 10, 2020  TIME: 12:16 PM

## 2020-09-10 NOTE — RESPIRATORY THERAPY NOTE
RT Ventilator Management Note  Francisco Crowe 34 y o  female MRN: [id number]  Unit/Bed#: [facility] 417-01 Encounter: [id number]      Daily Screen     No data found in the last 10 encounters              Physical Exam:   Assessment Type: Assess only  General Appearance: Awake  Respiratory Pattern: Assisted  Chest Assessment: Chest expansion symmetrical  Bilateral Breath Sounds: Clear  Suction: ET Tube      Resp Comments: (P) pt extubated per Ingris Lozano from critcal care, no stridor good voice will instruct pt on IS and continue to follow airway clearnce protocol pt deos not have a pulmonary hx and takes no meds @ home can d/c respiraotry protocol

## 2020-09-11 ENCOUNTER — APPOINTMENT (INPATIENT)
Dept: RADIOLOGY | Facility: HOSPITAL | Age: 30
DRG: 163 | End: 2020-09-11
Payer: COMMERCIAL

## 2020-09-11 PROBLEM — Z98.890 S/P TVR (TRICUSPID VALVE REPAIR): Status: ACTIVE | Noted: 2020-09-11

## 2020-09-11 PROBLEM — I07.1 TRICUSPID REGURGITATION: Status: RESOLVED | Noted: 2020-09-10 | Resolved: 2020-09-11

## 2020-09-11 LAB
ALBUMIN SERPL ELPH-MCNC: 3.62 G/DL (ref 3.5–5)
ALBUMIN SERPL ELPH-MCNC: 49.6 % (ref 52–65)
ALPHA1 GLOB SERPL ELPH-MCNC: 0.4 G/DL (ref 0.1–0.4)
ALPHA1 GLOB SERPL ELPH-MCNC: 5.5 % (ref 2.5–5)
ALPHA2 GLOB SERPL ELPH-MCNC: 0.98 G/DL (ref 0.4–1.2)
ALPHA2 GLOB SERPL ELPH-MCNC: 13.4 % (ref 7–13)
ANION GAP SERPL CALCULATED.3IONS-SCNC: 6 MMOL/L (ref 4–13)
ATRIAL RATE: 112 BPM
BETA GLOB ABNORMAL SERPL ELPH-MCNC: 0.47 G/DL (ref 0.4–0.8)
BETA1 GLOB SERPL ELPH-MCNC: 6.4 % (ref 5–13)
BETA2 GLOB SERPL ELPH-MCNC: 6.5 % (ref 2–8)
BETA2+GAMMA GLOB SERPL ELPH-MCNC: 0.47 G/DL (ref 0.2–0.5)
BUN SERPL-MCNC: 13 MG/DL (ref 5–25)
CALCIUM SERPL-MCNC: 8.1 MG/DL (ref 8.3–10.1)
CHLORIDE SERPL-SCNC: 104 MMOL/L (ref 100–108)
CO2 SERPL-SCNC: 26 MMOL/L (ref 21–32)
CREAT SERPL-MCNC: 0.36 MG/DL (ref 0.6–1.3)
ERYTHROCYTE [DISTWIDTH] IN BLOOD BY AUTOMATED COUNT: 14.8 % (ref 11.6–15.1)
GAMMA GLOB ABNORMAL SERPL ELPH-MCNC: 1.36 G/DL (ref 0.5–1.6)
GAMMA GLOB SERPL ELPH-MCNC: 18.6 % (ref 12–22)
GFR SERPL CREATININE-BSD FRML MDRD: 148 ML/MIN/1.73SQ M
GLUCOSE SERPL-MCNC: 106 MG/DL (ref 65–140)
GLUCOSE SERPL-MCNC: 110 MG/DL (ref 65–140)
GLUCOSE SERPL-MCNC: 112 MG/DL (ref 65–140)
GLUCOSE SERPL-MCNC: 113 MG/DL (ref 65–140)
GLUCOSE SERPL-MCNC: 138 MG/DL (ref 65–140)
GLUCOSE SERPL-MCNC: 93 MG/DL (ref 65–140)
GLUCOSE SERPL-MCNC: 98 MG/DL (ref 65–140)
HCT VFR BLD AUTO: 26 % (ref 34.8–46.1)
HGB BLD-MCNC: 8.3 G/DL (ref 11.5–15.4)
IGG/ALB SER: 0.98 {RATIO} (ref 1.1–1.8)
MAGNESIUM SERPL-MCNC: 2.2 MG/DL (ref 1.6–2.6)
MCH RBC QN AUTO: 27.4 PG (ref 26.8–34.3)
MCHC RBC AUTO-ENTMCNC: 31.9 G/DL (ref 31.4–37.4)
MCV RBC AUTO: 86 FL (ref 82–98)
P AXIS: 61 DEGREES
PHOSPHATE SERPL-MCNC: 4.9 MG/DL (ref 2.7–4.5)
PLATELET # BLD AUTO: 154 THOUSANDS/UL (ref 149–390)
PMV BLD AUTO: 8.4 FL (ref 8.9–12.7)
POTASSIUM SERPL-SCNC: 4.1 MMOL/L (ref 3.5–5.3)
PR INTERVAL: 171 MS
PROT PATTERN SERPL ELPH-IMP: ABNORMAL
PROT SERPL-MCNC: 7.3 G/DL (ref 6.4–8.2)
PTH-INTACT SERPL-MCNC: 36.2 PG/ML (ref 18.4–80.1)
QRS AXIS: 81 DEGREES
QRSD INTERVAL: 83 MS
QT INTERVAL: 321 MS
QTC INTERVAL: 439 MS
RBC # BLD AUTO: 3.03 MILLION/UL (ref 3.81–5.12)
SODIUM SERPL-SCNC: 136 MMOL/L (ref 136–145)
T WAVE AXIS: -7 DEGREES
VENTRICULAR RATE: 112 BPM
WBC # BLD AUTO: 7.18 THOUSAND/UL (ref 4.31–10.16)

## 2020-09-11 PROCEDURE — 80048 BASIC METABOLIC PNL TOTAL CA: CPT | Performed by: PHYSICIAN ASSISTANT

## 2020-09-11 PROCEDURE — 93005 ELECTROCARDIOGRAM TRACING: CPT

## 2020-09-11 PROCEDURE — 71045 X-RAY EXAM CHEST 1 VIEW: CPT

## 2020-09-11 PROCEDURE — 99232 SBSQ HOSP IP/OBS MODERATE 35: CPT | Performed by: INTERNAL MEDICINE

## 2020-09-11 PROCEDURE — 99254 IP/OBS CNSLTJ NEW/EST MOD 60: CPT | Performed by: INTERNAL MEDICINE

## 2020-09-11 PROCEDURE — 99024 POSTOP FOLLOW-UP VISIT: CPT | Performed by: THORACIC SURGERY (CARDIOTHORACIC VASCULAR SURGERY)

## 2020-09-11 PROCEDURE — 85027 COMPLETE CBC AUTOMATED: CPT | Performed by: PHYSICIAN ASSISTANT

## 2020-09-11 PROCEDURE — 84100 ASSAY OF PHOSPHORUS: CPT | Performed by: PHYSICIAN ASSISTANT

## 2020-09-11 PROCEDURE — 97164 PT RE-EVAL EST PLAN CARE: CPT

## 2020-09-11 PROCEDURE — 82652 VIT D 1 25-DIHYDROXY: CPT | Performed by: PHYSICIAN ASSISTANT

## 2020-09-11 PROCEDURE — 83970 ASSAY OF PARATHORMONE: CPT | Performed by: PHYSICIAN ASSISTANT

## 2020-09-11 PROCEDURE — 99233 SBSQ HOSP IP/OBS HIGH 50: CPT | Performed by: INTERNAL MEDICINE

## 2020-09-11 PROCEDURE — 83735 ASSAY OF MAGNESIUM: CPT | Performed by: PHYSICIAN ASSISTANT

## 2020-09-11 PROCEDURE — 82948 REAGENT STRIP/BLOOD GLUCOSE: CPT

## 2020-09-11 PROCEDURE — 99233 SBSQ HOSP IP/OBS HIGH 50: CPT | Performed by: PHYSICIAN ASSISTANT

## 2020-09-11 PROCEDURE — 93010 ELECTROCARDIOGRAM REPORT: CPT | Performed by: INTERNAL MEDICINE

## 2020-09-11 RX ORDER — HALOPERIDOL 5 MG/ML
2 INJECTION INTRAMUSCULAR ONCE
Status: COMPLETED | OUTPATIENT
Start: 2020-09-11 | End: 2020-09-11

## 2020-09-11 RX ORDER — DOCUSATE SODIUM 100 MG/1
100 CAPSULE, LIQUID FILLED ORAL 2 TIMES DAILY
Status: DISCONTINUED | OUTPATIENT
Start: 2020-09-11 | End: 2020-09-28 | Stop reason: HOSPADM

## 2020-09-11 RX ORDER — TEMAZEPAM 15 MG/1
15 CAPSULE ORAL
Status: DISCONTINUED | OUTPATIENT
Start: 2020-09-11 | End: 2020-09-11

## 2020-09-11 RX ORDER — LORAZEPAM 2 MG/ML
1 INJECTION INTRAMUSCULAR EVERY 2 HOUR PRN
Status: DISCONTINUED | OUTPATIENT
Start: 2020-09-11 | End: 2020-09-14

## 2020-09-11 RX ORDER — HYDROMORPHONE HCL/PF 1 MG/ML
2 SYRINGE (ML) INJECTION EVERY 2 HOUR PRN
Status: DISCONTINUED | OUTPATIENT
Start: 2020-09-11 | End: 2020-09-15

## 2020-09-11 RX ORDER — MORPHINE SULFATE 4 MG/ML
4 INJECTION, SOLUTION INTRAMUSCULAR; INTRAVENOUS EVERY 2 HOUR PRN
Status: DISCONTINUED | OUTPATIENT
Start: 2020-09-11 | End: 2020-09-11

## 2020-09-11 RX ORDER — HYDROMORPHONE HCL 110MG/55ML
2 PATIENT CONTROLLED ANALGESIA SYRINGE INTRAVENOUS EVERY 2 HOUR PRN
Status: DISCONTINUED | OUTPATIENT
Start: 2020-09-11 | End: 2020-09-11

## 2020-09-11 RX ORDER — POTASSIUM CHLORIDE 20 MEQ/1
20 TABLET, EXTENDED RELEASE ORAL DAILY
Status: DISCONTINUED | OUTPATIENT
Start: 2020-09-11 | End: 2020-09-12

## 2020-09-11 RX ORDER — LORAZEPAM 2 MG/ML
1 INJECTION INTRAMUSCULAR ONCE
Status: COMPLETED | OUTPATIENT
Start: 2020-09-11 | End: 2020-09-11

## 2020-09-11 RX ADMIN — CALCIUM ACETATE 667 MG: 667 CAPSULE ORAL at 06:36

## 2020-09-11 RX ADMIN — OXYCODONE HYDROCHLORIDE 15 MG: 10 TABLET ORAL at 13:21

## 2020-09-11 RX ADMIN — SENNOSIDES 8.6 MG: 8.6 TABLET ORAL at 17:18

## 2020-09-11 RX ADMIN — MUPIROCIN 1 APPLICATION: 20 OINTMENT TOPICAL at 20:20

## 2020-09-11 RX ADMIN — LORAZEPAM 1 MG: 2 INJECTION INTRAMUSCULAR; INTRAVENOUS at 20:18

## 2020-09-11 RX ADMIN — DULOXETINE HYDROCHLORIDE 60 MG: 60 CAPSULE, DELAYED RELEASE ORAL at 08:53

## 2020-09-11 RX ADMIN — Medication 0.2 MG/KG/HR: at 00:38

## 2020-09-11 RX ADMIN — HYDROMORPHONE HYDROCHLORIDE 2 MG: 2 INJECTION, SOLUTION INTRAMUSCULAR; INTRAVENOUS; SUBCUTANEOUS at 07:42

## 2020-09-11 RX ADMIN — CALCIUM ACETATE 667 MG: 667 CAPSULE ORAL at 17:18

## 2020-09-11 RX ADMIN — POLYETHYLENE GLYCOL 3350 17 G: 17 POWDER, FOR SOLUTION ORAL at 08:54

## 2020-09-11 RX ADMIN — HYDROMORPHONE HYDROCHLORIDE 2 MG: 1 INJECTION, SOLUTION INTRAMUSCULAR; INTRAVENOUS; SUBCUTANEOUS at 22:24

## 2020-09-11 RX ADMIN — Medication 250 MG: at 17:18

## 2020-09-11 RX ADMIN — OXYCODONE HYDROCHLORIDE 15 MG: 10 TABLET ORAL at 04:19

## 2020-09-11 RX ADMIN — CEFAZOLIN SODIUM 2000 MG: 2 SOLUTION INTRAVENOUS at 10:03

## 2020-09-11 RX ADMIN — DEXMEDETOMIDINE 0.7 MCG/KG/HR: 100 INJECTION, SOLUTION, CONCENTRATE INTRAVENOUS at 00:38

## 2020-09-11 RX ADMIN — MORPHINE SULFATE 2 MG: 2 INJECTION, SOLUTION INTRAMUSCULAR; INTRAVENOUS at 02:34

## 2020-09-11 RX ADMIN — HALOPERIDOL LACTATE 2 MG: 5 INJECTION INTRAMUSCULAR at 05:41

## 2020-09-11 RX ADMIN — MELATONIN 6 MG: at 20:19

## 2020-09-11 RX ADMIN — OXYCODONE HYDROCHLORIDE 15 MG: 10 TABLET ORAL at 17:18

## 2020-09-11 RX ADMIN — PANTOPRAZOLE SODIUM 40 MG: 40 TABLET, DELAYED RELEASE ORAL at 08:53

## 2020-09-11 RX ADMIN — AMIODARONE HYDROCHLORIDE 200 MG: 200 TABLET ORAL at 21:11

## 2020-09-11 RX ADMIN — ALPRAZOLAM 1 MG: 0.5 TABLET ORAL at 06:36

## 2020-09-11 RX ADMIN — FENTANYL CITRATE 50 MCG: 50 INJECTION INTRAMUSCULAR; INTRAVENOUS at 02:23

## 2020-09-11 RX ADMIN — ACETAMINOPHEN 975 MG: 325 TABLET, FILM COATED ORAL at 17:49

## 2020-09-11 RX ADMIN — LORAZEPAM 1 MG: 2 INJECTION INTRAMUSCULAR; INTRAVENOUS at 08:30

## 2020-09-11 RX ADMIN — GABAPENTIN 100 MG: 100 CAPSULE ORAL at 20:19

## 2020-09-11 RX ADMIN — MORPHINE SULFATE 2 MG: 2 INJECTION, SOLUTION INTRAMUSCULAR; INTRAVENOUS at 03:49

## 2020-09-11 RX ADMIN — ACETAMINOPHEN 975 MG: 325 TABLET, FILM COATED ORAL at 11:20

## 2020-09-11 RX ADMIN — AMIODARONE HYDROCHLORIDE 200 MG: 200 TABLET ORAL at 13:21

## 2020-09-11 RX ADMIN — ALPRAZOLAM 1 MG: 0.5 TABLET ORAL at 02:23

## 2020-09-11 RX ADMIN — LORAZEPAM 1 MG: 2 INJECTION INTRAMUSCULAR; INTRAVENOUS at 11:38

## 2020-09-11 RX ADMIN — CALCIUM ACETATE 667 MG: 667 CAPSULE ORAL at 11:20

## 2020-09-11 RX ADMIN — Medication 12.5 MG: at 20:20

## 2020-09-11 RX ADMIN — HYDROMORPHONE HYDROCHLORIDE 2 MG: 1 INJECTION, SOLUTION INTRAMUSCULAR; INTRAVENOUS; SUBCUTANEOUS at 15:47

## 2020-09-11 RX ADMIN — POTASSIUM CHLORIDE 20 MEQ: 1500 TABLET, EXTENDED RELEASE ORAL at 08:54

## 2020-09-11 RX ADMIN — LORAZEPAM 1 MG: 2 INJECTION INTRAMUSCULAR; INTRAVENOUS at 22:54

## 2020-09-11 RX ADMIN — FENTANYL CITRATE 50 MCG: 50 INJECTION INTRAMUSCULAR; INTRAVENOUS at 05:41

## 2020-09-11 RX ADMIN — OXYCODONE HYDROCHLORIDE 15 MG: 10 TABLET ORAL at 21:10

## 2020-09-11 RX ADMIN — AMIODARONE HYDROCHLORIDE 200 MG: 200 TABLET ORAL at 06:36

## 2020-09-11 RX ADMIN — DOCUSATE SODIUM 100 MG: 100 CAPSULE, LIQUID FILLED ORAL at 17:18

## 2020-09-11 RX ADMIN — Medication 12.5 MG: at 08:53

## 2020-09-11 RX ADMIN — MUPIROCIN 1 APPLICATION: 20 OINTMENT TOPICAL at 08:54

## 2020-09-11 RX ADMIN — HYDROMORPHONE HYDROCHLORIDE 2 MG: 1 INJECTION, SOLUTION INTRAMUSCULAR; INTRAVENOUS; SUBCUTANEOUS at 19:34

## 2020-09-11 RX ADMIN — LORAZEPAM 1 MG: 2 INJECTION INTRAMUSCULAR; INTRAVENOUS at 22:24

## 2020-09-11 RX ADMIN — OXYCODONE HYDROCHLORIDE 15 MG: 10 TABLET ORAL at 08:40

## 2020-09-11 RX ADMIN — MORPHINE SULFATE 4 MG: 4 INJECTION INTRAVENOUS at 11:21

## 2020-09-11 RX ADMIN — Medication 0.2 MG/KG/HR: at 17:18

## 2020-09-11 RX ADMIN — FENTANYL CITRATE 50 MCG: 50 INJECTION INTRAMUSCULAR; INTRAVENOUS at 04:09

## 2020-09-11 RX ADMIN — HYDROMORPHONE HYDROCHLORIDE 2 MG: 1 INJECTION, SOLUTION INTRAMUSCULAR; INTRAVENOUS; SUBCUTANEOUS at 13:31

## 2020-09-11 RX ADMIN — LORAZEPAM 1 MG: 2 INJECTION INTRAMUSCULAR; INTRAVENOUS at 16:06

## 2020-09-11 RX ADMIN — FENTANYL CITRATE 50 MCG: 50 INJECTION INTRAMUSCULAR; INTRAVENOUS at 06:58

## 2020-09-11 RX ADMIN — HALOPERIDOL LACTATE 2 MG: 5 INJECTION INTRAMUSCULAR at 02:40

## 2020-09-11 RX ADMIN — CEFAZOLIN SODIUM 2000 MG: 2 SOLUTION INTRAVENOUS at 03:22

## 2020-09-11 RX ADMIN — LORAZEPAM 1 MG: 2 INJECTION INTRAMUSCULAR; INTRAVENOUS at 13:46

## 2020-09-11 RX ADMIN — ASPIRIN 81 MG CHEWABLE TABLET 81 MG: 81 TABLET CHEWABLE at 08:54

## 2020-09-11 NOTE — PROGRESS NOTES
Patient re-evaluated this afternoon, pain better controlled but continues to feel significantly anxious  Currently ordered lorazepam 1 mg IV q 2 hours p r n  anxiety  Would be okay to give and occasional 1 mg IV additionally every 4-6 hours at the discretion of primary service provider      Musa Harrison PA-C

## 2020-09-11 NOTE — PHYSICAL THERAPY NOTE
Physical Therapy Cancellation Note      PT order received; chart reviewed; attempted to see pt in AM; pt is observed reclined in the chair; reports having too much at this time to move (recently medicated for pain, as per nsg); pt declines any mobilization incl standing to reposition in the chair despite education and encouragement; nsg informed; will follow      Luis Fernando Holguin PT

## 2020-09-11 NOTE — OCCUPATIONAL THERAPY NOTE
Occupational Therapy Refusal        Patient Name: Debbie Crenshaw  UFKDE'D Date: 9/11/2020    OT orders received  Chart reviewed  Spoke to RN prior to attempt, who reported pt recently medicated for pain  Attempted to see pt  Pt adamantly refusing reporting she was in 1050/10 pain  Educated pt on importance of mobilizing post cardiac surgery  Pt continuing to refuse at this time       Harrison Turner, MS, OTR/L

## 2020-09-11 NOTE — PLAN OF CARE
Problem: PHYSICAL THERAPY ADULT  Goal: Performs mobility at highest level of function for planned discharge setting  See evaluation for individualized goals  Description: Treatment/Interventions: Functional transfer training, LE strengthening/ROM, Elevations, Therapeutic exercise, Endurance training, Equipment eval/education, Bed mobility, Gait training, Spoke to nursing, Spoke to case management, Spoke to advanced practitioner, OT(will defer from (R) hip therex at this time)  Equipment Recommended: Walker(at this time; will cont to monitor progress)       See flowsheet documentation for full assessment, interventions and recommendations  Note: Prognosis: Guarded  Problem List: Decreased strength, Decreased endurance, Impaired balance, Decreased mobility, Pain  Assessment: Functional mobility re-assessment initiated; pt presents s/p TV repair (Dx: 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, history of injection drug abuse) and currently exhibits mod general pain and guarding, decreased functional endurance, and overall weakness and deconditioning likely affecting her balance and mobility skills; min (A) was required for transfers and amb trial at bedside w/ rw; pt remained in NAD and returned back to chair at the end of session; will cont to follow pt in PT for graded mobilization as clinical course allows to max functional (I), endurance, and safety; otherwise, anticipate pt will return home w/ available family support upon D/C pending additional progress and when medically cleared; will follow  Barriers to Discharge: Inaccessible home environment     PT Discharge Recommendation: Return to previous environment with social support     PT - OK to Discharge: No    See flowsheet documentation for full assessment

## 2020-09-11 NOTE — RESPIRATORY THERAPY NOTE
resp care      09/11/20 1400   Respiratory Assessment   Assessment Type Assess only   General Appearance Alert; Awake   Respiratory Pattern Normal   Chest Assessment Chest expansion symmetrical   Bilateral Breath Sounds Clear   Resp Comments pt awake and refusing to do IS , pt encouraged to do IS multiple times but refuses,will D/C ACP at this time

## 2020-09-11 NOTE — PROGRESS NOTES
Progress Note - Critical Care   Alessia Elena 34 y o  female MRN: [id number]  Unit/Bed#: [facility] 417-01 Encounter: [id number]      Attending Physician: Kiko Venegas DO    24 Hour Events/HPI: POD # 1 s/p TV repair with leaflet resection and repair with 30mm annuloplasty ring  Post operatively receieved 1L LR and 250cc albumin for hypotension with improvement  Farhat weaned to off  APS following and managing regiment  Per patient pain poorly controlled, extremely anxious, difficult to take a deep breath  ROS: Review of Systems  Review of systems was reviewed and negative unless stated above in HPI/24-hour events   ---------------------------------------------------------------------------------------------------------------------------------------------------------------------  Impressions:  1  Tricuspid valve endocarditis s/p TV repair with leaflet resection and repair with 30mm annuloplasty ring  2  Bipolar 1 disorder  3  B/l pulmonary septic emboli   4  Right hip pain  5  Hyperphosphatemia  6  Anemia  7  Hep c  8  Depression/anxiety  9  Acute pain complicated by previous substance abuse disorder  10  Acute blood loss anemia    Plan:    Neuro:   · Pain controlled with: Ketamine gtt, precedex gtt, PRN fentanyl/oxycodone, cymbalta, PRN xanax   · Regulate sleep/wake cycle  · Delirium precautions  · CAM-ICU daily  · Trend neuro exam      CV:   · Cardiac infusions: None  · MAP goal > 65 and CI >2 2  · D/C [place] Massiel catheter  · D/C Arterial line  · Rhythm: Sinus Tachycardia  · Follow rhythm on telemetry  · Lopressor 12 5 mg PO BID  · Maintain epicardial pacing wires for pacing neeeds      Lung:   · Acute post-op pulmonary insufficiency; Requiring 3 liters via nasal cannula, secondary to poor inspiratory effort secondary to pain  Continue incentive spirometry/coughing/deep breathing exercises    Wean supplemental oxygen as tolerated for saturation > 90%  · Wean NC as tolerated  · Chest tube output remains persistently high; Continue chest tubes to suction today      GI:   · Continue PPI for stress ulcer prophylaxis  · Continue bowel regimen  · Trend abdominal exam and bowel function    FEN:   · Diuretic plan: Lasix 40 mg IV q QD  · K-dur 20 mEQ PO q QD  · Nutrition/diet plan: Cardiac  · Replenish electrolytes with goals: K >4 0, Mag >2 0, and Phos >3 0    :   · Indwelling Dominguez present: yes   · D/C Dominguez  · Trend UOP and BUN/creat  · Strict I and O    ID:   · Source of infection: Endocarditis   · Abx ordered: Cefazolin  · ID following  · 6 week course   · Trend temps and WBC count  · Maintain normothermia        Heme:   · Trend hgb and plts    Endo:   · Glycemic control plan: No history of diabetes: Glucose well-controlled  Discontinue continuous insulin infusion and add Insulin sliding scale coverage        MSK/Skin:  · Mobility goal:   · PT consult: yes  · OT consult: yes  · Frequent turning and pressure off-loading  · Local wound care as needed    Disposition:  Transfer to telemetry  ---------------------------------------------------------------------------------------------------------------------------------------------------------------------  Allergies:    Allergies   Allergen Reactions    Cat Hair Extract     Dog Epithelium     Latex     Pollen Extract      Medications:   Scheduled Meds:  Current Facility-Administered Medications   Medication Dose Route Frequency Provider Last Rate    acetaminophen  650 mg Oral Q6H PRN Arun Radulescu, DMD      acetaminophen  975 mg Oral Q8H Calista Torres PA-C      ALPRAZolam  1 mg Oral Q4H PRN Arun Radulescu, DMD      alteplase  2 mg Intracatheter Q12H PRN Arun Radulesnatacha, DMD      amiodarone  200 mg Oral [facility] Calista Torres PA-C      aspirin  81 mg Oral Daily Calista Torres PA-C      bisacodyl  10 mg Rectal Daily PRN Calista Torres PA-C      calcium acetate  667 mg Oral TID With Meals Arun Rebollar DMD      calcium carbonate  1,000 mg Oral Daily PRN Arun Radulescu, DMD      calcium chloride  1 g Intravenous Once Karel Nissen, PA-C      cefazolin  2,000 mg Intravenous Q8H Karel Nissen, PA-C 2,000 mg (09/11/20 0322)    chlorhexidine  15 mL Swish & Spit BID Karel Nissen, PA-C      dexmedetomidine  0 1-1 5 mcg/kg/hr Intravenous Titrated DorJESUS Gee-C 0 1 mcg/kg/hr (09/11/20 0703)    dicyclomine  10 mg Oral TID PRN Arun Radulescu, DMD      DULoxetine  60 mg Oral Daily Arun Radulescu, DMD      fentanyl citrate (PF)  50 mcg Intravenous Q1H PRN James Zarco PA-C      furosemide  40 mg Intravenous Q6H PRN Karel Nissen, PA-C      gabapentin  100 mg Oral HS Karel Nissen, PA-C      glycopyrrolate  0 2 mg Intravenous Q4H PRN Karel Nissen, PA-C      haloperidol lactate  2 mg Intramuscular Q30 Min PRN Karel Nissen, PA-C      hydrocortisone   Topical 4x Daily PRN Arun Radulescu, DMD      insulin regular (HumuLIN R,NovoLIN R) infusion  0 3-21 Units/hr Intravenous Titrated Karel Nissen, PA-C      iohexol  50 mL Oral 90 min pre-procedure Arun Radulescu, DMD      ketamine  0 2 mg/kg/hr Intravenous Continuous JESSICA BarbosaC 0 2 mg/kg/hr (09/11/20 0038)    lactated ringers  500 mL Intravenous Q30 Min PRN Karel Nissen, PA-C      lidocaine (cardiac)  100 mg Intravenous Q30 Min PRN Karel Nissen, PA-C      lidocaine  2 patch Topical Daily Arun Radulescu, DMD      magnesium citrate  296 mL Oral Daily PRN Arun Radulescu, DMD      melatonin  6 mg Oral HS PRN Arun Radulescu, DMD      methylnaltrexone bromide  12 mg Subcutaneous Q48H PRN Arun Radulescu, DMD      miconazole   Topical BID Arun Radulescu, DMD      morphine injection  2 mg Intravenous Q4H PRN Arun Radulescu, DMD      mupirocin  1 application Nasal B73V [facility] Karel Nissen, PA-C      niCARdipine  2 5-15 mg/hr Intravenous Titrated Karel Nissen, PA-C Stopped (09/10/20 5629)    nystatin   Topical BID Arun Rebollar, DMD      ondansetron  4 mg Intravenous Q6H PRN Yury Pablo, PA-C      oxyCODONE  15 mg Oral Q4H PRN Arun Radulescu, DMD      pantoprazole  40 mg Oral Daily Yury Pablo, PA-C      phenylephine   mcg/min Intravenous Titrated Sergey Salazar PA-C Stopped (09/11/20 0038)    polyethylene glycol  17 g Oral Daily Yury Pablo, PA-C      potassium chloride  20 mEq Intravenous Once PRN Yury Pablo, PA-C      potassium chloride  20 mEq Intravenous Q1H PRN Yury Pablo, PA-C      potassium chloride  20 mEq Intravenous Q30 Min PRN Yury Pablo, PA-C      saccharomyces boulardii  250 mg Oral BID Arun Radulescu, DMD      senna  1 tablet Oral BID Arun Radulescu, DMD      sodium chloride  20 mL/hr Intravenous Continuous Yury Pablo, PA-C 20 mL/hr (09/10/20 1156)     VTE Pharmacologic Prophylaxis: Fondaparinux (Arixtra)  VTE Mechanical Prophylaxis: sequential compression device    Continuous Infusions:dexmedetomidine, 0 1-1 5 mcg/kg/hr, Last Rate: 0 1 mcg/kg/hr (09/11/20 0703)  insulin regular (HumuLIN R,NovoLIN R) infusion, 0 3-21 Units/hr  ketamine, 0 2 mg/kg/hr, Last Rate: 0 2 mg/kg/hr (09/11/20 0038)  niCARdipine, 2 5-15 mg/hr, Last Rate: Stopped (09/10/20 1216)  phenylephine,  mcg/min, Last Rate: Stopped (09/11/20 0038)  sodium chloride, 20 mL/hr, Last Rate: 20 mL/hr (09/10/20 1156)      PRN Meds:  acetaminophen, 650 mg, Q6H PRN  ALPRAZolam, 1 mg, Q4H PRN  alteplase, 2 mg, Q12H PRN  bisacodyl, 10 mg, Daily PRN  calcium carbonate, 1,000 mg, Daily PRN  dicyclomine, 10 mg, TID PRN  fentanyl citrate (PF), 50 mcg, Q1H PRN  furosemide, 40 mg, Q6H PRN  glycopyrrolate, 0 2 mg, Q4H PRN  haloperidol lactate, 2 mg, Q30 Min PRN  hydrocortisone, , 4x Daily PRN  lactated ringers, 500 mL, Q30 Min PRN  lidocaine (cardiac), 100 mg, Q30 Min PRN  magnesium citrate, 296 mL, Daily PRN  melatonin, 6 mg, HS PRN  methylnaltrexone bromide, 12 mg, Q48H PRN  morphine injection, 2 mg, Q4H PRN  ondansetron, 4 mg, Q6H PRN  oxyCODONE, 15 mg, Q4H PRN  potassium chloride, 20 mEq, Once PRN  potassium chloride, 20 mEq, Q1H PRN  potassium chloride, 20 mEq, Q30 Min PRN      Home Medications:   Prior to Admission medications    Not on File     ---------------------------------------------------------------------------------------------------------------------------------------------------------------------  Vitals:   Vitals:    20 0400 20 0500 20 0600 20 0700   BP: 104/68 104/67 112/76 124/76   Pulse: 102 (!) 110 (!) 116 (!) 114   Resp: (!) 32 18 19 20   Temp:       TempSrc:       SpO2: 99% 100% 100% 100%   Weight:   79 3 kg (174 lb 13 2 oz)    Height:         Arterial Line:  Arterial Line BP: 132/74  Arterial Line MAP (mmHg): 94 mmHg    Tele Rhythm: Sinus Tachycardia This was personally reviewed by myself  Respiratory:  SpO2: SpO2: 100 %, SpO2 Activity: SpO2 Activity: During Exercise, SpO2 Device: O2 Device: Nasal cannula  Nasal Cannula O2 Flow Rate (L/min): 6 L/min    Ventilator:  Respiratory    Lab Data (Last 4 hours)    None         O2/Vent Data (Last 4 hours)    None              Temperature: Temp (24hrs), Av 7 °F (37 1 °C), Min:97 7 °F (36 5 °C), Max:99 3 °F (37 4 °C)  Current: Temperature: 99 3 °F (37 4 °C)    Weights:   Weight (last 2 days)     Date/Time   Weight    20 0600   79 3 (174 83)    20 1900   67 9 (149 69)            Body mass index is 29 09 kg/m²  Hemodynamic Monitoring:  PAP: PAP: , CVP: CVP (mean): 9 mmHg, CO: CO (L/min): 4 2 L/min, CI: CI (L/min/m2): 2 4 L/min/m2, SVR: SVR (dyne*sec)/cm5: 970 (dyne*sec)/cm5  PAP: (12-29)/(2-14)   CVP:  [8 mmHg] 8 mmHg  CO:  [3 8 L/min-7 3 L/min] 4 2 L/min  CI:  [2 2 L/min/m2-4 1 L/min/m2] 2 4 L/min/m2    Intake and Outputs:    Intake/Output Summary (Last 24 hours) at 2020 0709  Last data filed at 2020 0600  Gross per 24 hour   Intake 4457 17 ml   Output 2780 ml   Net 1677 17 ml     I/O last 24 hours: In: 4457 2 [P O :240; I V :3217 2;  IV Piggyback:550]  Out: 2780 [Urine:2055; Blood:450; Chest Tube:275]    UOP: 0 7cc/kg/hour   BM: 0 in the last 24 hours    Chest tube Output:  Mediastinal tubes: 90 mL/8 hours  265 mL/24 hours   Pleural tubes: 0 mL/8 hours  10 mL/24 hours     Labs:  Results from last 7 days   Lab Units 09/11/20  0321 09/10/20  1838 09/10/20  1133  09/10/20  1005  09/09/20  0525 09/07/20  0610 09/05/20  0550   WBC Thousand/uL 7 18  --   --   --   --   --  5 84 5 48 5 98   HEMOGLOBIN g/dL 8 3* 9 1* 10 8*  --   --   --  10 4* 9 2* 9 5*   I STAT HEMOGLOBIN   --   --   --    < >  --    < >  --   --   --    HEMATOCRIT % 26 0* 28 2* 34 4*  --   --   --  33 7* 29 9* 30 4*   HEMATOCRIT, ISTAT   --   --   --    < >  --    < >  --   --   --    PLATELETS Thousands/uL 154  --  269  --  277  --  304 283 273   NEUTROS PCT %  --   --   --   --   --   --  42* 32* 31*   MONOS PCT %  --   --   --   --   --   --  8 7 9    < > = values in this interval not displayed       Results from last 7 days   Lab Units 09/11/20  0321 09/10/20  1838 09/10/20  1457 09/10/20  1133 09/10/20  1131 09/10/20  1036 09/10/20  0956  09/10/20  0449 09/09/20  0525 09/07/20  0609   SODIUM mmol/L 136  --   --  142  --   --   --   --  138 140 140   POTASSIUM mmol/L 4 1 4 6 5 8* 4 0  --   --   --   --  4 1 4 0 4 3   CHLORIDE mmol/L 104  --   --  108  --   --   --   --  105 105 109*   CO2 mmol/L 26  --   --  26  --   --   --   --  29 29 28   CO2, I-STAT mmol/L  --   --   --   --  25 27 27   < >  --   --   --    BUN mg/dL 13  --   --  14  --   --   --   --  17 15 15   CREATININE mg/dL 0 36*  --   --  0 42*  --   --   --   --  0 47* 0 40* 0 38*   CALCIUM mg/dL 8 1*  --   --  8 1*  --   --   --   --  8 8 9 2 8 2*   ALK PHOS U/L  --   --   --   --   --   --   --   --  174* 189* 192*   ALT U/L  --   --   --   --   --   --   --   --  28 30 39   AST U/L  --   --   --   --   --   --   --   --  47* 46* 73*   GLUCOSE, ISTAT mg/dl  --   --   --   --  88 172* 220*   < >  --   --   --     < > = values in this interval not displayed  Results from last 7 days   Lab Units 09/11/20  0321 09/09/20  0525   MAGNESIUM mg/dL 2 2 2 2     Results from last 7 days   Lab Units 09/11/20  0410 09/10/20  1838 09/10/20  0449   PHOSPHORUS mg/dL 4 9* 5 2* 6 5*      Results from last 7 days   Lab Units 09/10/20  1838   INR  1 06   PTT seconds 33                      Results from last 7 days   Lab Units 09/10/20  0449   TSH 3RD GENERATON uIU/mL 7 620*   FREE T4 ng/dL 0 95       Micro:   Blood Culture:   Lab Results   Component Value Date    BLOODCX No Growth After 5 Days  08/26/2020    BLOODCX No Growth After 5 Days  08/15/2020    BLOODCX Staphylococcus aureus (A) 08/13/2020    BLOODCX Staphylococcus aureus (A) 08/10/2020    BLOODCX Staphylococcus aureus (A) 08/10/2020    BLOODCX Staphylococcus coagulase negative (A) 08/10/2020    BLOODCX No Growth After 5 Days  07/21/2020     Urine Culture:   Lab Results   Component Value Date    URINECX 40,000-49,000 cfu/ml  07/15/2020    URINECX No Growth <1000 cfu/mL 11/20/2016     Sputum Culture: No components found for: SPUTUMCX  Wound Culure:   Lab Results   Component Value Date    WOUNDCULT 1+ Growth of Staphylococcus aureus (A) 07/21/2020       Diagnositic Studies:  09/11/20 CXR: Resolved pneumothorax, pulmonary vascular congestion  This was personally reviewed by myself in PACS   09/11/20 EKG: Sinus tachycardia  This was personally reviewed by myself  Nutrition:        Diet Orders   (From admission, onward)             Start     Ordered    09/11/20 0000  Diet Cardiovascular; Cardiac; Fluid Restriction 1800 ML, Lo Phos  Diet effective 0500     Question Answer Comment   Diet Type Cardiovascular    Cardiac Cardiac    Other Restriction(s): Fluid Restriction 1800 ML    Other Restriction(s): Lo Phos    RD to adjust diet per protocol?  Yes        09/10/20 1348    08/12/20 2002  Room Service  Once     Question:  Type of Service  Answer:  Room Service - Appropriate with Assistance 08/12/20 2001                Physical Exam  Invasive lines and devices: Invasive Devices     Peripherally Inserted Central Catheter Line            PICC Line 08/26/20 15 days          Central Venous Catheter Line            CVC Central Lines 09/10/20 Triple less than 1 day          Line            Pacer Wires less than 1 day    Pacer Wires less than 1 day          Drain            Chest Tube 1 Right Pleural 32 Fr  less than 1 day    Chest Tube 2 Posterior Mediastinal 32 Fr  less than 1 day    Chest Tube 3 Anterior Mediastinal 32 Fr  less than 1 day    Urethral Catheter Non-latex; Temperature probe 16 Fr  less than 1 day              ---------------------------------------------------------------------------------------------------------------------------------------------------------------------  Code Status: Level 1 - Full Code    Care Time Delivered:   No Critical Care time spent     Collaborative bedside rounds performed with cardiac surgery attending, critical care attending and bedside RN      SIGNATURE: Pam Burton PA-C  DATE: September 11, 2020  TIME: 7:09 AM

## 2020-09-11 NOTE — PROGRESS NOTES
Progress Note - Infectious Disease   Ernestine Avilez 34 y o  female MRN: [id number]  Unit/Bed#: [facility] 421-01 Encounter: [id number]      Impression/Plan:    1  Recurrent MSSA bacteremia with TV infective endocarditis:  Prolonged course of IV antibiotic was previously recommended, but patient has left AMA on 2 prior occasions (once at [facility] and once from [facility])    She remains hemodynamically stable   Repeat blood culture negative on 08/15/2020     -continue cefazolin at least through 9/26/2020 to complete 6 weeks from the 1st negative blood culture  -patient is postop day 1 for tricuspid valve surgery; operative culture remains negative so far;  If operative culture remains negative, then there would be no need to prolong IV antibiotic treatment beyond 09/26/2020  -recheck CBC with diff and CMP weekly while on the IV antibiotics     2  Tricuspid valve endocarditis, 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:  MELISSA done 07/21/2020 showed large vegetation on TV with severe regurgitation  7/15 CT abdomen/pelvis also showed bilateral renal parenchymal abnormalities concerning for septic emboli, patient also had right-sided loculated pleural effusion   No intra-abdominal abscess   Repeat CT chest 09/04/2020 shows resolution of right-sided pleural effusion  Patient is status post repair of tricuspid valve with annuloplasty on 09/10/2020  -antibiotic plan as above  -follow-up final operative culture results  -close CT surgery follow-up     3   Right iliacus muscle abscesses/sacroiliitis:  CT scan of right lower extremity on 08/21/2020 showed 9 mm iliacus muscle collection, decreasing in size   Follow-up imaging with MRI done 08/31/2020 with evidence of sacroiliitis that is probably septic   There are no destructive changes or fluid collection   Suspect this is the cause of the patient's ongoing pain   Inflammatory markers continue to decrease, ESR now normal, CRP down to 5 (32>14>5)  -antibiotics as above  -will check ESR and CRP late next week; if inflammatory markers continue to be normal, will stop antibiotics on 09/26/2020   -pain management        4  Back pain:   Slightly improving, more likely secondary to chronic pain, opioid dependence   8/1/20 Thoracic and lumbar spine MRIs performed at [facility] negative were for abscess or osteomyelitis   CT scan nondiagnostic for osteomyelitis or diskitis   Sedimentation rate and CRP continue to decrease as mentioned above    -monitor back pain   -serial exams  -acute pain service follow-up  -recheck CRP and sedimentation rate weekly while on IV antibiotics as mentioned above     5  Active IV heroin abuse:   This is the causative factor for development of bacteremia and infective endocarditis   Patient has left AMA on prior occasions   HIV screen negative  -patient is not a candidate for at home PICC line/IV antibiotics  -acute pain service follow-up     6   Chronic HCV infection:   Recent HIV was negative   The LFTs are waxing waning  Review of Care everywhere shows high hepatitis C viral load 01/2020   -monitor LFTs   - outpatient follow-up with GI     7  Left upper extremity DVT:  in the setting of PICC line use   Patient now on anticoagulation  -vascular follow-up  -serial left upper extremity exam; if symptoms of increased pain, swelling, might consider need to remove left upper extremity PICC line  -anticoagulation     8- mild eosinophilia:  Patient denies itching, rash or other potential side effects from antibiotic use  Creatinine is stable  - continue cefazolin  -repeat CBC with differential in a m      Discussed the above management plan with the primary service  Plan mentioned above discussed with the patient     Antibiotics:  Cefazolin restart 33  Negative blood cultures 25  Postop day 1       Subjective:  Patient is complaining of pain "all over"  She reports feeling tired  Is not able to localize her pain but answers yes when asked if she is experiencing any chest pain      Objective:  Vitals:  Temp:  [98 6 °F (37 °C)-99 3 °F (37 4 °C)] 99 1 °F (37 3 °C)  HR:  [] 111  Resp:  [13-33] 18  BP: ()/(53-91) 132/77  SpO2:  [96 %-100 %] 96 %  Temp (24hrs), Av 1 °F (37 3 °C), Min:98 6 °F (37 °C), Max:99 3 °F (37 4 °C)  Current: Temperature: 99 1 °F (37 3 °C)    Physical Exam:   General Appearance:  Alert, interactive, no signs of acute distress, but does look tired   Throat: Oropharynx moist without lesions  Lungs:   Decreased air entry at bases bilaterally, no wheezing or rhonchi   Heart:  RRR; no murmur, rub or gallop  Sternal wound with dry and clean overlying dressing, no surrounding erythema, no dehiscence or purulence   Abdomen:   Soft, non-tender, non-distended, positive bowel sounds  Extremities: No clubbing, cyanosis or edema   Skin: No new rashes or lesions  Labs, Imaging, & Other studies:   All pertinent labs and imaging studies were personally reviewed  Results from last 7 days   Lab Units 20  0321 09/10/20  1838 09/10/20  1133  09/10/20  1005  20  0525 20  0610   WBC Thousand/uL 7 18  --   --   --   --   --  5 84 5 48   HEMOGLOBIN g/dL 8 3* 9 1* 10 8*  --   --   --  10 4* 9 2*   I STAT HEMOGLOBIN   --   --   --    < >  --    < >  --   --    PLATELETS Thousands/uL 154  --  269  --  277  --  304 283    < > = values in this interval not displayed       Results from last 7 days   Lab Units 20  0321  09/10/20  1133 09/10/20  1131  09/10/20  0449 20  0525 20  0609   SODIUM mmol/L 136  --  142  --   --  138 140 140   POTASSIUM mmol/L 4 1   < > 4 0  --   --  4 1 4 0 4 3   CHLORIDE mmol/L 104  --  108  --   --  105 105 109*   CO2 mmol/L 26  --  26  --   --  29 29 28   CO2, I-STAT mmol/L  --   --   --  25   < >  --   --   --    BUN mg/dL 13  --  14  --   --  17 15 15   CREATININE mg/dL 0 36*  --  0 42*  --   --  0 47* 0 40* 0 38*   EGFR ml/min/1 73sq m 148  --  141  --   --  136 143 146 GLUCOSE, ISTAT mg/dl  --   --   --  88   < >  --   --   --    CALCIUM mg/dL 8 1*  --  8 1*  --   --  8 8 9 2 8 2*   AST U/L  --   --   --   --   --  47* 46* 73*   ALT U/L  --   --   --   --   --  28 30 39   ALK PHOS U/L  --   --   --   --   --  174* 189* 192*    < > = values in this interval not displayed       Results from last 7 days   Lab Units 09/10/20  0922   GRAM STAIN RESULT  2+ Polys  No organisms seen         Results from last 7 days   Lab Units 09/07/20  0609   CRP mg/L 5 8*     Results from last 7 days   Lab Units 09/10/20  0449   FERRITIN ng/mL 71

## 2020-09-11 NOTE — PROGRESS NOTES
Progress Note - Nephrology   Anabell Friday 34 y o  female MRN: [id number]  Unit/Bed#: [facility] 421-01 Encounter: [id number]      Assessment / Plan:  1  Hyperphosphatemia-phos 6 4-->4 9 on PhosLo 1 tab TID  Continue low phos diet  -PTH suppressed with low vitamin D level noted  Calcium high normal at 10  -CK normal  -lipid panel shows high Tg but otherwise normal  -TSH high at 7 6, T4 0 95  -would avoid use of phosphate containing enemas/avoid fleet enemas  -f/u am phos, am CMP  -high normal calcium noted at 10 when corrected for albumin  -endocrinology consult pending  -as renal function stable and unlikely cause of hyperphosphatemia, nephrology will sign off  Call/text with questions     2  Anemia - Hgb 10 8--->8 3, iron panel shows low iron sat at 17%, SPEP/UPEP pending but myeloma highly unlikely in setting of normal calcium and normal renal function, monitor CBC  No FREDDIE in light of acute left DVT      3  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 - s/p tricuspid valve repair and leaflet resection along with partial ring annuloplasty today, on IV ABx per ID/primary team, CT surgery on board  Avoid relative hypotension       4  Bipolar 1 disorder - management per psych        Subjective:   Patient has been transferred out of the ICU today  She complains of pain as for pain medication  No complaints  Objective:     Vitals: Blood pressure 132/77, pulse (!) 111, temperature 99 1 °F (37 3 °C), temperature source Oral, resp  rate 18, height 5' 5" (1 651 m), weight 79 3 kg (174 lb 13 2 oz), SpO2 96 %, not currently breastfeeding  ,Body mass index is 29 09 kg/m²  Temp (24hrs), Av 1 °F (37 3 °C), Min:98 6 °F (37 °C), Max:99 3 °F (37 4 °C)      Weight (last 2 days)     Date/Time   Weight    20 0600   79 3 (174 83)    20 1900   67 9 (149 69)                Intake/Output Summary (Last 24 hours) at 2020 1204  Last data filed at 2020 0845  Gross per 24 hour   Intake 2058 14 ml   Output 1870 ml   Net 188 14 ml     I/O last 24 hours: In: 4268 [P O :240; I V :3321; IV Piggyback:550]  Out: 0243 [Urine:2180; Blood:450; Chest Tube:285]        Physical Exam:   Physical Exam  Vitals signs and nursing note reviewed  Constitutional:       General: She is not in acute distress  Appearance: She is well-developed  She is not diaphoretic  HENT:      Head: Normocephalic and atraumatic  Mouth/Throat:      Pharynx: No oropharyngeal exudate  Eyes:      General: No scleral icterus  Right eye: No discharge  Left eye: No discharge  Neck:      Musculoskeletal: Normal range of motion and neck supple  Thyroid: No thyromegaly  Cardiovascular:      Rate and Rhythm: Normal rate and regular rhythm  Heart sounds: No murmur  Comments: Chest incision covered, +chest tubes  Pulmonary:      Effort: Pulmonary effort is normal  No respiratory distress  Breath sounds: Normal breath sounds  No wheezing  Abdominal:      General: Bowel sounds are normal  There is no distension  Palpations: Abdomen is soft  Genitourinary:     Comments: +yuen  Skin:     General: Skin is warm and dry  Findings: No rash  Neurological:      General: No focal deficit present  Mental Status: She is alert  Motor: No abnormal muscle tone  Comments: awake   Psychiatric:         Mood and Affect: Mood normal       Comments: irritable         Invasive Devices     Peripherally Inserted Central Catheter Line            PICC Line 08/26/20 15 days          Central Venous Catheter Line            CVC Central Lines 09/10/20 Triple 1 day          Line            Pacer Wires 1 day    Pacer Wires 1 day          Drain            Chest Tube 1 Right Pleural 32 Fr  1 day    Chest Tube 2 Posterior Mediastinal 32 Fr  1 day    Chest Tube 3 Anterior Mediastinal 32 Fr  1 day    Urethral Catheter Non-latex; Temperature probe 16 Fr  1 day Medications:    Scheduled Meds:  Current Facility-Administered Medications   Medication Dose Route Frequency Provider Last Rate    acetaminophen  650 mg Oral Q6H PRN Julia Joseph PA-C      acetaminophen  975 mg Oral Q8H Julia Joseph PA-C      amiodarone  200 mg Oral Q8H [address] Julia Joseph PA-C      aspirin  81 mg Oral Daily Julia Joseph PA-C      bisacodyl  10 mg Rectal Daily PRN Julia Joseph PA-C      calcium acetate  667 mg Oral TID With Meals Julia Joseph PA-C      calcium carbonate  1,000 mg Oral Daily PRN Julia Joseph PA-C      dicyclomine  10 mg Oral TID PRN Julia Joseph PA-C      docusate sodium  100 mg Oral BID Julia Joseph PA-C      DULoxetine  60 mg Oral Daily Julia Joseph PA-C      gabapentin  100 mg Oral HS Julia Joseph PA-C      glycopyrrolate  0 2 mg Intravenous Q4H PRN Julia Joseph PA-C      haloperidol lactate  2 mg Intramuscular Q30 Min PRN Julia Joseph PA-C      hydrocortisone   Topical 4x Daily PRN Julia Joseph PA-C      insulin lispro  1-5 Units Subcutaneous TID AC Deisy Judd PA-C      iohexol  50 mL Oral 90 min pre-procedure Julia Joseph PA-C      ketamine  0 2 mg/kg/hr Intravenous Continuous Julia Joseph PA-C 0 2 mg/kg/hr (09/11/20 0038)    lidocaine  2 patch Topical Daily Julia Joseph PA-C      LORazepam  1 mg Intravenous Q2H PRN Julia Joseph PA-C      magnesium citrate  296 mL Oral Daily PRN Julia Joseph PA-C      melatonin  6 mg Oral HS PRN Julia Joseph PA-C      methylnaltrexone bromide  12 mg Subcutaneous Q48H PRN Julia Joseph PA-C      metoprolol tartrate  12 5 mg Oral Q12H [address] Julia Joseph PA-C      miconazole   Topical BID Julia Joseph PA-C      morphine injection  4 mg Intravenous Q2H PRN Julia Joseph PA-C      mupirocin  1 application Nasal M87N [address] Julia Joseph PA-C      nystatin   Topical BID Julia Joseph PA-C      ondansetron  4 mg Intravenous Q6H PRN [place], MILLIE      oxyCODONE  15 mg Oral Q4H PRN [place], MILLIE      pantoprazole  40 mg Oral Daily [place], MILLIE      polyethylene glycol  17 g Oral Daily [place], MILLIE      potassium chloride  20 mEq Oral Daily [place], MILLIE      saccharomyces boulardii  250 mg Oral BID [place], MILLIE      senna  1 tablet Oral BID [place], MILLIE         PRN Meds:   acetaminophen    bisacodyl    calcium carbonate    dicyclomine    glycopyrrolate    haloperidol lactate    hydrocortisone    LORazepam    magnesium citrate    melatonin    methylnaltrexone bromide    morphine injection    ondansetron    oxyCODONE    Continuous Infusions:ketamine, 0 2 mg/kg/hr, Last Rate: 0 2 mg/kg/hr (09/11/20 0038)            LAB RESULTS:      Results from last 7 days   Lab Units 09/11/20  0410 09/11/20  0321 09/10/20  1838 09/10/20  1457 09/10/20  1133 09/10/20  1131 09/10/20  1036 09/10/20  1005 09/10/20  0956 09/10/20  0936 09/10/20  0932 09/10/20  0927 09/10/20  0858  09/10/20  0449 09/09/20  0525 09/07/20  0610 09/07/20  0609 09/05/20  0550   WBC Thousand/uL  --  7 18  --   --   --   --   --   --   --   --   --   --   --   --   --  5 84 5 48  --  5 98   HEMOGLOBIN g/dL  --  8 3* 9 1*  --  10 8*  --   --   --   --   --   --   --   --   --   --  10 4* 9 2*  --  9 5*   I STAT HEMOGLOBIN g/dl  --   --   --   --   --  9 9* 8 8*  --  8 2* 8 2* 8 2* 7 1* 10 2*   < >  --   --   --   --   --    HEMATOCRIT %  --  26 0* 28 2*  --  34 4*  --   --   --   --   --   --   --   --   --   --  33 7* 29 9*  --  30 4*   HEMATOCRIT, ISTAT %  --   --   --   --   --  29* 26*  --  24* 24* 24* 21* 30*   < >  --   --   --   --   --    PLATELETS Thousands/uL  --  154  --   --  269  --   --  277  --   --   --   --   --   --   --  304 283  --  273   NEUTROS PCT %  --   --   --   --   --   --   --   --   --   --   --   --   --   --   --  42* 32*  --  31*   LYMPHS PCT %  --   --   -- --   --   --   --   --   --   --   --   --   --   --   --  36 42  --  46*   MONOS PCT %  --   --   --   --   --   --   --   --   --   --   --   --   --   --   --  8 7  --  9   EOS PCT %  --   --   --   --   --   --   --   --   --   --   --   --   --   --   --  14* 17*  --  13*   POTASSIUM mmol/L  --  4 1 4 6 5 8* 4 0  --   --   --   --   --   --   --   --   --  4 1 4 0  --  4 3 3 7   CHLORIDE mmol/L  --  104  --   --  108  --   --   --   --   --   --   --   --   --  105 105  --  109* 107   CO2 mmol/L  --  26  --   --  26  --   --   --   --   --   --   --   --   --  29 29  --  28 28   CO2, I-STAT mmol/L  --   --   --   --   --  25 27  --  27 26 30 26 25   < >  --   --   --   --   --    BUN mg/dL  --  13  --   --  14  --   --   --   --   --   --   --   --   --  17 15  --  15 17   CREATININE mg/dL  --  0 36*  --   --  0 42*  --   --   --   --   --   --   --   --   --  0 47* 0 40*  --  0 38* 0 45*   CALCIUM mg/dL  --  8 1*  --   --  8 1*  --   --   --   --   --   --   --   --   --  8 8 9 2  --  8 2* 8 5   ALK PHOS U/L  --   --   --   --   --   --   --   --   --   --   --   --   --   --  174* 189*  --  192*  --    ALT U/L  --   --   --   --   --   --   --   --   --   --   --   --   --   --  28 30  --  39  --    AST U/L  --   --   --   --   --   --   --   --   --   --   --   --   --   --  47* 46*  --  73*  --    GLUCOSE, ISTAT mg/dl  --   --   --   --   --  88 172*  --  220* 147* 140 128 124   < >  --   --   --   --   --    MAGNESIUM mg/dL  --  2 2  --   --   --   --   --   --   --   --   --   --   --   --   --  2 2  --   --   --    PHOSPHORUS mg/dL 4 9*  --  5 2*  --   --   --   --   --   --   --   --   --   --   --  6 5* 6 4*  --   --   --     < > = values in this interval not displayed  CUTURES:  Lab Results   Component Value Date    BLOODCX No Growth After 5 Days  08/26/2020    BLOODCX No Growth After 5 Days   08/15/2020    BLOODCX Staphylococcus aureus (A) 08/13/2020    BLOODCX Staphylococcus aureus (A) 08/10/2020    BLOODCX Staphylococcus aureus (A) 08/10/2020    BLOODCX Staphylococcus coagulase negative (A) 08/10/2020    BLOODCX No Growth After 5 Days  07/21/2020    URINECX 40,000-49,000 cfu/ml  07/15/2020    URINECX No Growth <1000 cfu/mL 11/20/2016                 Portions of the record may have been created with voice recognition software  Occasional wrong word or "sound a like" substitutions may have occurred due to the inherent limitations of voice recognition software  Read the chart carefully and recognize, using context, where substitutions have occurred  If you have any questions, please contact the dictating provider

## 2020-09-11 NOTE — PROGRESS NOTES
Message Claude Norris with Acute pain because patient was ranting and raving how pain medication is not helping her and she demanded to see Claude Norris about her pain regiment  Claude Norris came down to see the patient 10 minutes later and patient is sound asleep in her room

## 2020-09-11 NOTE — PROGRESS NOTES
Progress Note - Cardiothoracic Surgery   Patricia Law 34 y o  female MRN: [id number]  Unit/Bed#: [facility] 417-01 Encounter: [id number]      POD # 1 s/p TV repair    Pt seen/examined  Interval history and data reviewed with critical care team   Pt doing well  Complaints of pain      No vasoactive gtts      Medications:   Scheduled Meds:  Current Facility-Administered Medications   Medication Dose Route Frequency Provider Last Rate    acetaminophen  650 mg Oral Q6H PRN Arun Radulescu, DMD      acetaminophen  975 mg Oral Q8H [facility] MILLIE Mckay      ALPRAZolam  1 mg Oral Q4H PRN Arun Radulescu, DMD      alteplase  2 mg Intracatheter Q12H PRN Arun Rademanuelcu, DMD      amiodarone  200 mg Oral [facility]      aspirin  81 mg Oral Daily [facility]      bisacodyl  10 mg Rectal Daily PRN [facility] espinosa      calcium acetate  667 mg Oral TID With Meals Arun Radpablito, DMD      calcium carbonate  1,000 mg Oral Daily PRN Arun Radpablito, DMD      calcium chloride  1 g Intravenous Once [facility]      cefazolin  2,000 mg Intravenous Q8H [facility] 2,000 mg (09/11/20 0322)    chlorhexidine  15 mL Swish & Spit BID [facility] MILLIE Mckay      dexmedetomidine  0 1-1 5 mcg/kg/hr Intravenous Titrated JESSICA RodriguezC 0 1 mcg/kg/hr (09/11/20 0703)    dicyclomine  10 mg Oral TID PRN Arun Radpablito, DMD      DULoxetine  60 mg Oral Daily Arun Radpablito, DMD      furosemide  40 mg Intravenous Q6H PRN [facility] MILLIE Mckay      gabapentin  100 mg Oral HS [facility]      glycopyrrolate  0 2 mg Intravenous Q4H PRN [facility] mel[facility]      haloperidol lactate  2 mg Intramuscular Q30 Min PRN [facility] espinosa      hydrocortisone   Topical 4x Daily PRN Arun Radulescu, DMD      HYDROmorphone  2 mg Intravenous Q2H PRN Olivier Cobos PA-C      insulin regular (HumuLIN R,NovoLIN R) infusion  0 3-21 Units/hr Intravenous Titrated Shoaib Manzanita MILLIE      iohexol  50 mL Oral 90 min pre-procedure Arun Radulescu, DMD      ketamine  0 2 mg/kg/hr Intravenous Continuous Paz Felty Burkett, PA-C 0 2 mg/kg/hr (09/11/20 0038)    lactated ringers  500 mL Intravenous Q30 Min [facility]      lidocaine (cardiac)  100 mg Intravenous Q30 Min [facility]N [facility]      lidocaine  2 patch Topical Daily Arun Radulescu, DMD      magnesium citrate  296 mL Oral Daily PRN Arun Radulescu, DMD      melatonin  6 mg Oral HS PRN Arun Radulescu, DMD      methylnaltrexone bromide  12 mg Subcutaneous Q48H PRN Arun Radulescu, DMD      miconazole   Topical BID Arun Radulescu, DMD      mupirocin  1 application Nasal U15M [facility]      niCARdipine  2 5-15 mg/hr Intravenous Titrated [facility] MILLIE Mckay Stopped (09/10/20 1216)    nystatin   Topical BID Arun Radulescu, DMD      ondansetron  4 mg Intravenous Q6H PRN [facility]      oxyCODONE  15 mg Oral Q4H PRN [facility], DMD      pantoprazole  40 mg Oral Daily [facility]      phenylephine   mcg/min Intravenous Titrated Carley Hardy PA-C Stopped (09/11/20 0038)    polyethylene glycol  17 g Oral Daily [facility] [place], PA-C      potassium chloride  20 mEq Intravenous Once PRN [facility]      potassium chloride  20 mEq Intravenous Q1H PRN [facility]      potassium chloride  20 mEq Intravenous Q30 Min PRN [facility]      saccharomyces boulardii  250 mg Oral BID Arun Radulescu, DMD      senna  1 tablet Oral BID Arun Radulescu, DMD      sodium chloride  20 mL/hr Intravenous Continuous [facility] MILLIE Mckay 20 mL/hr (09/10/20 1156)     Continuous Infusions:dexmedetomidine, 0 1-1 5 mcg/kg/hr, Last Rate: 0 1 mcg/kg/hr (09/11/20 0703)  insulin regular (HumuLIN R,NovoLIN R) infusion, 0 3-21 Units/hr  ketamine, 0 2 mg/kg/hr, Last Rate: 0 2 mg/kg/hr (09/11/20 0038)  niCARdipine, 2 5-15 mg/hr, Last Rate: Stopped (09/10/20 1216)  phenylephine,  mcg/min, Last Rate: Stopped (09/11/20 0038)  sodium chloride, 20 mL/hr, Last Rate: 20 mL/hr (09/10/20 1156)      PRN Meds:   acetaminophen    ALPRAZolam    alteplase    bisacodyl    calcium carbonate    dicyclomine    furosemide    glycopyrrolate    haloperidol lactate    hydrocortisone    HYDROmorphone    lactated ringers    lidocaine (cardiac)    magnesium citrate    melatonin    methylnaltrexone bromide    ondansetron    oxyCODONE    potassium chloride    potassium chloride    potassium chloride    Vitals: Blood pressure 124/76, pulse (!) 114, temperature 99 3 °F (37 4 °C), resp  rate 20, height 5' 5" (1 651 m), weight 79 3 kg (174 lb 13 2 oz), SpO2 100 %, not currently breastfeeding  ,Body mass index is 29 09 kg/m²  I/O last 24 hours: In: 4457 2 [P O :240; I V :3217 2; IV Piggyback:550]  Out: 9228 [Urine:2055; Blood:450; Chest Tube:275]  Invasive Devices     Peripherally Inserted Central Catheter Line            PICC Line 08/26/20 15 days          Central Venous Catheter Line            CVC Central Lines 09/10/20 Triple 1 day          Line            Pacer Wires less than 1 day    Pacer Wires less than 1 day          Drain            Urethral Catheter Non-latex; Temperature probe 16 Fr  1 day    Chest Tube 1 Right Pleural 32 Fr  less than 1 day    Chest Tube 2 Posterior Mediastinal 32 Fr  less than 1 day    Chest Tube 3 Anterior Mediastinal 32 Fr  less than 1 day                  Lab, Imaging and other studies:   Results from last 7 days   Lab Units 09/11/20  0321 09/10/20  1838 09/10/20  1133  09/10/20  1005  09/09/20  0525 09/07/20  0610   WBC Thousand/uL 7 18  --   --   --   --   --  5 84 5 48   HEMOGLOBIN g/dL 8 3* 9 1* 10 8*  --   --   --  10 4* 9 2*   I STAT HEMOGLOBIN   --   --   --    < >  --    < >  --   --    HEMATOCRIT % 26 0* 28 2* 34 4*  --   --   --  33 7* 29 9*   HEMATOCRIT, ISTAT   --   --   --    < >  --    < >  --   --    PLATELETS Thousands/uL 154  --  269  --  277  --  304 283    < > = values in this interval not displayed  Results from last 7 days   Lab Units 09/11/20  0321 09/10/20  1838 09/10/20  1457 09/10/20  1133 09/10/20  1131  09/10/20  0449   POTASSIUM mmol/L 4 1 4 6 5 8* 4 0  --   --  4 1   CHLORIDE mmol/L 104  --   --  108  --   --  105   CO2 mmol/L 26  --   --  26  --   --  29   CO2, I-STAT mmol/L  --   --   --   --  25   < >  --    BUN mg/dL 13  --   --  14  --   --  17   CREATININE mg/dL 0 36*  --   --  0 42*  --   --  0 47*   GLUCOSE, ISTAT mg/dl  --   --   --   --  88   < >  --    CALCIUM mg/dL 8 1*  --   --  8 1*  --   --  8 8    < > = values in this interval not displayed  Results from last 7 days   Lab Units 09/10/20  1838   INR  1 06   PTT seconds 33     No results for input(s): PHART, BRT6CBV, PO2ART, ZDL4HUK, B5HFVHAE, BEART in the last 72 hours  Plan:    DC [place]  Continue chest tubes, pacing wires  Transfer to floor  Ambulate  Incentive spirometry  Diuresis  PO ASA/B blocker  Pain mgmt per pain service  ABx per ID  OR cultures pending        SIGNATURERodney Hines MD  DATE: September 11, 2020  TIME: 8:11 AM

## 2020-09-11 NOTE — SOCIAL WORK
Pt is now transferred to [facility], post-op from open-heart TVR procedure  Pt will remain hospitalized for several more weeks until her IV ABX regiment is complete on 9/26/20  Pt must remain hospitalized infusion of IV ABX because pt is unable to be safely d/c'd home w/ a [address] [place] [address] to provide pt w/ home care and home infusion of her IV ABX  This is due to pt's hx of IV Heroin abuse and the high liability she could use the port site for her IV ABX instead for IV street drugs     CM to follow

## 2020-09-11 NOTE — PHYSICAL THERAPY NOTE
Physical Therapy Re-Evaluation     Patient's Name: Betty Awan    Admitting Diagnosis  Endocarditis [I38]    Problem List  Patient Active Problem List   Diagnosis    40 weeks gestation of pregnancy    Spontaneous vaginal delivery    Acute pyelonephritis    Loculated pleural effusion    Heroin abuse ([facility] )    Bacteremia due to Staphylococcus aureus    Abscess of left foot    Insomnia    Acute bacterial endocarditis    Septic embolism (HCC)    Hypokalemia    Bipolar 1 disorder ([facility] )    Right hip pain    Intravenous drug abuse ([facility] )    DVT of axillary vein, acute left (HCC)    Transaminitis    Rash    Anemia    Chronic hepatitis C virus infection ([facility] )    Chronic, continuous use of opioids    Hyperphosphatemia    S/P TVR (tricuspid valve repair)       Past Medical History  Past Medical History:   Diagnosis Date    Abnormal Pap smear of cervix     Anxiety     Depression     Endocarditis     2018    Hepatitis C     HPV (human papilloma virus) anogenital infection        Past Surgical History  Past Surgical History:   Procedure Laterality Date    IR PICC LINE PLACEMENT DOUBLE LUMEN  8/26/2020    KNEE SURGERY Left     MOUTH SURGERY      TOOTH EXTRACTION N/A 9/7/2020    Procedure: EXTRACTION TOOTH 32;  Surgeon: Maureen Farrell DMD;  Location: [facility];  Service: Maxillofacial        09/11/20 1227   PT Last Visit   PT Visit Date 09/11/20   Note Type   Note type Re-eval   Pain Assessment   Pain Assessment Tool FLACC   Pain Location/Orientation Location: Generalized   Pain Onset/Description Onset: Ongoing;Frequency: Constant/Continuous; Descriptor: Aching;Descriptor: Discomfort   Effect of Pain on Daily Activities guarding w/ positional changes   Patient's Stated Pain Goal No pain   Hospital Pain Intervention(s) Ambulation/increased activity; Emotional support;Repositioned   Pain Rating: FLACC (Rest) - Face 0   Pain Rating: FLACC (Rest) - Legs 0   Pain Rating: FLACC (Rest) - Activity 0   Pain Rating: FLACC (Rest) - Cry 0   Pain Rating: FLACC (Rest) - Consolability 0   Score: FLACC (Rest) 0   Pain Rating: FLACC (Activity) - Face 1   Pain Rating: FLACC (Activity) - Legs 1   Pain Rating: FLACC (Activity) - Activity 1   Pain Rating: FLACC (Activity) - Cry 1   Pain Rating: FLACC (Activity) - Consolability 1   Score: FLACC (Activity) 5   Home Living   Type of Home   (see initial eval for details on home set-up)   Prior Function   Level of [place] Independent with ADLs and functional mobility   Comments see initial eval for details   Restrictions/Precautions   Other Precautions Cardiac/sternal;Multiple lines;Telemetry; Fall Risk;Pain  (CT;)   General   Additional Pertinent History Pt underwent complex tricuspid valve repair with leaflet resection and repair and 30 mm [employer] Contour 3D partial ring annuloplasty on 9/10/2020; PT is reconsulted; pt is cleared for mobilization (nsg and PA-C w/ pain management present during the session)   Cognition   Overall Cognitive Status [facility]   Arousal/Participation Responsive   Attention Attends with cues to redirect   Orientation Level Oriented to person;Oriented to situation   Memory Decreased recall of recent events;Decreased recall of precautions   Following Commands Follows one step commands with increased time or repetition   Comments Pt is reclined in the chair and positioned towards the (R) side; resting; arousable but remains somnolent; agreeable to try mobilization after much encouragement, education and re-assurance from staff;   RUE Assessment   RUE Assessment WFL  (AROM)   LUE Assessment   LUE Assessment WFL  (AROM)   RLE Assessment   RLE Assessment WFL  (AROM)   Strength RLE   RLE Overall Strength   (fair/ fair + (grossly))   LLE Assessment   LLE Assessment WFL  (AROM)   Strength LLE   LLE Overall Strength   (fair +/ good - (grossly))   Transfers   Sit to Stand 4  Minimal assistance   Additional items Assist x 1;Verbal cues   Stand to Sit 4  Minimal assistance   Additional items Assist x 1;Verbal cues   Additional Comments Pt was reclined in the chair w/ LE elevated at the end of session; SCDs on; nsg performed additional repositioning of the pt towards the (R) side w/ pillows for support; call bell w/in reach   Ambulation/Elevation   Gait pattern Excessively slow; Step to;Short stride   Gait Assistance 4  Minimal assist   Additional items Assist x 1;Verbal cues; Tactile cues  (stand by (A) of 2 more staff for lines)   Assistive Device Rolling walker   Distance 3 x 4 ft distances at bedside; pt wished to return back to chair at that point;    Balance   Static Sitting Fair   Dynamic Sitting Fair -   Static Standing Poor +   Ambulatory Poor +   Activity Tolerance   Activity Tolerance Patient limited by fatigue;Patient limited by pain   Medical Staff Made Aware spoke to Ana Templeton w/ pain management   Nurse Made Aware spoke to Chloe [address] [place] [address]   Assessment   Prognosis Guarded   Problem List Decreased strength;Decreased endurance; Impaired balance;Decreased mobility;Pain   Assessment Functional mobility re-assessment initiated; pt presents s/p TV repair (Dx: 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, history of injection drug abuse) and currently exhibits mod general pain and guarding, decreased functional endurance, and overall weakness and deconditioning likely affecting her balance and mobility skills; min (A) was required for transfers and amb trial at bedside w/ rw; pt remained in NAD and returned back to chair at the end of session; will cont to follow pt in PT for graded mobilization as clinical course allows to max functional (I), endurance, and safety; otherwise, anticipate pt will return home w/ available family support upon D/C pending additional progress and when medically cleared; will follow     Barriers to Discharge Inaccessible home environment   Goals   Patient Goals to have less pain   STG Expiration Date 09/25/20   Short Term Goal #1 see LTG below   LTG Expiration Date 09/25/20   Long Term Goal #1 10-14 days  Pt will amb 300 ft w/ least restrictive assistive device PRN, mod (I) in order to facilitate safe return to premorbid environment and community amb status  Pt will negotiate 2 steps w/ hand rail (if available at home) and SPC PRN, mod (I) and 14 steps w/ hand rail (if available at home) and SPC PRN, mod (I) in order to navigate in and out of the house and between levels of home environment safely  Pt will achieve (I) level w/ bed mob in order to facilitate safety with OOB and back to bed transitions in own living environment  Pt will perform transfers w/ mod (I) to assure (I) and safety w/ functional mobility/transitions w/ all aspects of mobility/locomotion  Pt will participate in LE therex and balance activities to max progression w/ mobility skills (will defer from (R) hip therex at this time due to ongoing issues)  PT Treatment Day 0   Plan   Treatment/Interventions Functional transfer training;LE strengthening/ROM; Elevations; Therapeutic exercise; Endurance training;Equipment eval/education; Bed mobility;Gait training;Spoke to nursing;Spoke to case management;Spoke to advanced practitioner;OT  (will defer from (R) hip therex at this time)   PT Frequency Other (Comment)  (3-6x/wk)   Recommendation   PT Discharge Recommendation Return to previous environment with social support   Equipment Recommended Walker  (at this time; will cont to monitor progress)   PT - OK to Discharge No   Modified Booker Scale   Modified [place] Scale 4           Luis Fernando Holguin, PT

## 2020-09-11 NOTE — CONSULTS
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:  Has left AMA x2 without completing treatment at [facility] and [place]  Septic emboli to lungs and kidney  BC negative on 08/15  · Will complete 6 weeks of IV antibiotics through PICC on 09/26  ID following  · Or cultures pending    Severe TR with TV endocarditis:  s/p complex for assessment valve repair with leaflet resection and repair and 30 mm Medtronic contour 3D partial ring annuloplasty  POD #1   · Intraoperative MELISSA:  EF 55%, Tricuspid ring with mild regurgitation  · Rhythm management:  Amiodarone 200 mg TID and Lopressor 12 5 mg BID  · Volume managed:  Not currently on a diuretic  Output 24 hours +1 6 L  Net output +4 6 L  · Hemodynamics:  Nicardipine, phenylephrine discontinued last evening  Remains with CT and EPW  · Aspirin, statin, beta-blocker    Preserved biventricular systolic function:  EF 04% on intraoperative MELISSA  · Echocardiogram 09/02/2020:  Preserved LV systolic function with EF of 55%, no wall motion abnormality  Dilated RV with normal function  Mildly dilated RA  Severe TR with peptic related echogenic 6 agitation measuring 14 mm x 8 mm anterior leaflet on the right atrial aspect  When compared to the study from 07/19/2020 ventricular size increased otherwise no significant change  Anemia:  Hemoglobin 8 3 today  Baseline hemoglobin 9-10    Hypophosphatemia:  Phosphorus 4 9 today  Renal function stable therefore nephrology signed off  Endocrine consulted      IV drug abuse:  Using heroin and cocaine prior to admission  Motivated to stay clean  Bipolar disorder:  Behavioral health following    Plan/Recommendations:  · Consider initiated on IV Lasix for negative fluid balance  · Continue current cardiac medications  · Encourage ambulation and incentive spirometry    _____________________________________________________________________________    CC:  Back pain    History of Present Illness   HPI: Judith Montoya is a 34y o  year old female who has a history of IV drug abuse (heroin and cocaine), hepatitis-C, bipolar disorder, opioid dependence, endocarditis left AMA from [facility] during admission 7/30-8/4 prior to completing treatment with antibiotics presented to the emergency room at [facility] on 08/10/2020 with complaints of lower back pain  Prior to her [facility] admission she was admitted to [facility] from 07/15-7/23 when she eloped without signing [facility] paperwork  MELISSA revealed mobile mass on the tricuspid valve with severe TR  Patient was admitted with acute bacterial endocarditis, bacteremia, septic emboli (lungs and kidney)  She was transferred to [facility] for CT surgical evaluation  During this admission she has been evaluated by [facility], acute pain, infectious Disease, OBGYN, and vascular surgery  She was evaluated by CT surgery and recommended to undergo complete course of antibiotics prior to undergoing any surgical intervention  After further discussion with CT surgery, patient expressing desire to get better in a few committing to abstinence from drugs she was recommended for tricuspid valve repair  Patient will continue cefazolin via PICC 3 9/26 to complete 6 weeks  Patient underwent complex for assessment valve repair with leaflet resection and repair and 30 mm Medtronic contour 3D partial ring annuloplasty on 09/10/2020  Intraoperative MELISSA revealed EF of 55% a tricuspid ring with mild regurgitation, no stenosis  Cardiology has been consulted for postoperative co-management  Patient is sleeping in chair during consultation and was unable to stay awake despite multiple attempts to obtain history or complete review of systems  Chart review to obtain information  Nods her head yes to chest pain  CT chest 09/04/2020:  Right pleural effusion resolved  Mostly improved nodular and consolidative opacities related to septic emboli, mild adenopathy  Echocardiogram 09/02/2020:  Preserved LV systolic function with EF of 55%, no wall motion abnormality  Dilated RV with normal function  Mildly dilated RA  Severe TR with peptic related echogenic 6 agitation measuring 14 mm x 8 mm anterior leaflet on the right atrial aspect  When compared to the study from 07/19/2020 ventricular size increased otherwise no significant change  MELISSA 07/21/2020:  EF 65%, no wall motion abnormality, normal RV size and function  Small PFO  Trace MR, medium-sized papillary renal vegetation measuring 11 mm x 10 mm the tip of the anterior leaflet in right atrial size aspect  EKG reviewed personally: 9/11/2020 sinus tachycardia 112 beats per minute with nonspecific T-wave abnormality inferolaterally to are more pronounced compared to the EKG from 09/10/2020      Telemetry reviewed personally:  Sinus tachycardia in the 100s      Review of Systems   Unable to perform ROS: other     Historical Information   Past Medical History:   Diagnosis Date    Abnormal Pap smear of cervix     Anxiety     Depression     Endocarditis     2018    Hepatitis C     HPV (human papilloma virus) anogenital infection      Past Surgical History:   Procedure Laterality Date    IR PICC LINE PLACEMENT DOUBLE LUMEN  8/26/2020    KNEE SURGERY Left     MOUTH SURGERY      TOOTH EXTRACTION N/A 9/7/2020    Procedure: EXTRACTION TOOTH 32;  Surgeon: Georgette Cole DMD;  Location: [facility];  Service: Maxillofacial     Social History     Substance and Sexual Activity Alcohol Use Not Currently    Alcohol/week: 0 0 standard drinks     Social History     Substance and Sexual Activity   Drug Use Yes    Types: Heroin    Comment: last use - one week ago     Social History     Tobacco Use   Smoking Status Former Smoker    Packs/day: 0 25    Types: Cigarettes    Last attempt to quit: 11/9/2016    Years since quitting: 3 8   Smokeless Tobacco Never Used   Tobacco Comment    current everyday smoker per Finn Boogie     Family History: History reviewed  No pertinent family history      Meds/Allergies   all current active meds have been reviewed, current meds:   Current Facility-Administered Medications   Medication Dose Route Frequency    acetaminophen (TYLENOL) tablet 650 mg  650 mg Oral Q6H PRN    acetaminophen (TYLENOL) tablet 975 mg  975 mg Oral Q8H    amiodarone tablet 200 mg  200 mg Oral Q8H JEFF    aspirin chewable tablet 81 mg  81 mg Oral Daily    bisacodyl (DULCOLAX) rectal suppository 10 mg  10 mg Rectal Daily PRN    calcium acetate (PHOSLO) capsule 667 mg  667 mg Oral TID With Meals    calcium carbonate (TUMS) chewable tablet 1,000 mg  1,000 mg Oral Daily PRN    dicyclomine (BENTYL) capsule 10 mg  10 mg Oral TID PRN    docusate sodium (COLACE) capsule 100 mg  100 mg Oral BID    DULoxetine (CYMBALTA) delayed release capsule 60 mg  60 mg Oral Daily    gabapentin (NEURONTIN) capsule 100 mg  100 mg Oral HS    glycopyrrolate (ROBINUL) injection 0 2 mg  0 2 mg Intravenous Q4H PRN    haloperidol lactate (HALDOL) injection 2 mg  2 mg Intramuscular Q30 Min PRN    hydrocortisone 1 % cream   Topical 4x Daily PRN    HYDROmorphone (DILAUDID) injection 2 mg  2 mg Intravenous Q2H PRN    insulin lispro (HumaLOG) 100 units/mL subcutaneous injection 1-5 Units  1-5 Units Subcutaneous TID AC    iohexol (OMNIPAQUE) 240 MG/ML solution 50 mL  50 mL Oral 90 min pre-procedure    ketamine 250 mg (STANDARD CONCENTRATION) IV in sodium chloride 0 9% 250 mL  0 2 mg/kg/hr Intravenous Continuous    lidocaine (LIDODERM) 5 % patch 2 patch  2 patch Topical Daily    LORazepam (ATIVAN) injection 1 mg  1 mg Intravenous Q2H PRN    magnesium citrate (CITROMA) oral solution 296 mL  296 mL Oral Daily PRN    melatonin tablet 6 mg  6 mg Oral HS PRN    methylnaltrexone (RELISTOR) subcutaneous injection 12 mg  12 mg Subcutaneous Q48H PRN    metoprolol tartrate (LOPRESSOR) partial tablet 12 5 mg  12 5 mg Oral Q12H JEFF    miconazole 2 % cream   Topical BID    mupirocin (BACTROBAN) 2 % nasal ointment 1 application  1 application Nasal D48Q [address]    nystatin (MYCOSTATIN) cream   Topical BID    ondansetron (ZOFRAN) injection 4 mg  4 mg Intravenous Q6H PRN    oxyCODONE (ROXICODONE) IR tablet 15 mg  15 mg Oral Q4H PRN    pantoprazole (PROTONIX) EC tablet 40 mg  40 mg Oral Daily    polyethylene glycol (MIRALAX) packet 17 g  17 g Oral Daily    potassium chloride (K-DUR,KLOR-CON) CR tablet 20 mEq  20 mEq Oral Daily    saccharomyces boulardii (FLORASTOR) capsule 250 mg  250 mg Oral BID    senna (SENOKOT) tablet 8 6 mg  1 tablet Oral BID    and PTA meds:   None     ketamine, 0 2 mg/kg/hr, Last Rate: 0 2 mg/kg/hr (09/11/20 0038)        Allergies   Allergen Reactions    Cat Hair Extract     Dog Epithelium     Latex     Pollen Extract        Objective   Vitals: Blood pressure 132/77, pulse (!) 111, temperature 99 1 °F (37 3 °C), temperature source Oral, resp  rate 18, height 5' 5" (1 651 m), weight 79 3 kg (174 lb 13 2 oz), SpO2 96 %, not currently breastfeeding ,     Body mass index is 29 09 kg/m²  ,     Systolic (99CDO), JRB:381 , Min:77 , AQE:093     Diastolic (80ECG), CBU:63, Min:53, Max:91    Wt Readings from Last 3 Encounters:   09/11/20 79 3 kg (174 lb 13 2 oz)   08/26/20 63 kg (139 lb)   08/10/20 63 5 kg (140 lb)      Lab Results   Component Value Date    CREATININE 0 36 (L) 09/11/2020    CREATININE 0 42 (L) 09/10/2020    CREATININE 0 47 (L) 09/10/2020             Intake/Output Summary (Last 24 hours) at 9/11/2020 1216  Last data filed at 9/11/2020 0845  Gross per 24 hour   Intake 2058 14 ml   Output 1870 ml   Net 188 14 ml     Weight (last 2 days)     Date/Time   Weight    09/11/20 0600   79 3 (174 83)    09/09/20 1900   67 9 (149 69)            Invasive Devices     Peripherally Inserted Central Catheter Line            PICC Line 08/26/20 15 days          Central Venous Catheter Line            CVC Central Lines 09/10/20 Triple 1 day          Line            Pacer Wires 1 day    Pacer Wires 1 day          Drain            Chest Tube 1 Right Pleural 32 Fr  1 day    Chest Tube 2 Posterior Mediastinal 32 Fr  1 day    Chest Tube 3 Anterior Mediastinal 32 Fr  1 day    Urethral Catheter Non-latex; Temperature probe 16 Fr  1 day                  Physical Exam  Constitutional:       General: She is not in acute distress  Appearance: She is well-developed  Comments: Sleeping during examination   HENT:      Head: Normocephalic and atraumatic  Neck:      Musculoskeletal: Normal range of motion and neck supple  Comments: Invasive lines noted in right neck  Cardiovascular:      Rate and Rhythm: Regular rhythm  Tachycardia present  Heart sounds: Normal heart sounds  No murmur  No friction rub  Pulmonary:      Effort: Pulmonary effort is normal  No respiratory distress  Breath sounds: No wheezing or rales  Comments: Diminished breath sounds anteriorly  Abdominal:      General: Bowel sounds are normal  There is no distension  Palpations: Abdomen is soft  Tenderness: There is no abdominal tenderness  Musculoskeletal: Normal range of motion  Right lower leg: No edema  Left lower leg: No edema  Skin:     General: Skin is warm and dry  Comments: Sternal incision with occlusive dressing   Neurological:      Mental Status: She is alert     Psychiatric:      Comments: Sleeping           LABORATORY RESULTS:  Results from last 7 days   Lab Units 09/09/20  1350   CK TOTAL U/L 27     CBC with diff:   Results from last 7 days   Lab Units 09/11/20  0321 09/10/20  1838 09/10/20  1133 09/10/20  1131 09/10/20  1036 09/10/20  1005 09/10/20  0956 09/10/20  0936  09/09/20  0525 09/07/20  0610 09/05/20  0550   WBC Thousand/uL 7 18  --   --   --   --   --   --   --   --  5 84 5 48 5 98   HEMOGLOBIN g/dL 8 3* 9 1* 10 8*  --   --   --   --   --   --  10 4* 9 2* 9 5*   I STAT HEMOGLOBIN g/dl  --   --   --  9 9* 8 8*  --  8 2* 8 2*   < >  --   --   --    HEMATOCRIT % 26 0* 28 2* 34 4*  --   --   --   --   --   --  33 7* 29 9* 30 4*   HEMATOCRIT, ISTAT %  --   --   --  29* 26*  --  24* 24*   < >  --   --   --    MCV fL 86  --   --   --   --   --   --   --   --  87 89 87   PLATELETS Thousands/uL 154  --  269  --   --  277  --   --   --  304 283 273   MCH pg 27 4  --   --   --   --   --   --   --   --  26 9 27 3 27 3   MCHC g/dL 31 9  --   --   --   --   --   --   --   --  30 9* 30 8* 31 3*   RDW % 14 8  --   --   --   --   --   --   --   --  14 6 14 8 15 1   MPV fL 8 4*  --  8 2*  --   --  8 5*  --   --   --  8 8* 9 0 8 6*   NRBC AUTO /100 WBCs  --   --   --   --   --   --   --   --   --  0 0 0    < > = values in this interval not displayed         CMP:  Results from last 7 days   Lab Units 09/11/20  0321 09/10/20  1838 09/10/20  1457 09/10/20  1133 09/10/20  1131 09/10/20  1036 09/10/20  0956 09/10/20  0936 09/10/20  0932 09/10/20  0927 09/10/20  0858  09/10/20  0449 09/09/20  0525 09/07/20  0609 09/05/20  0550   POTASSIUM mmol/L 4 1 4 6 5 8* 4 0  --   --   --   --   --   --   --   --  4 1 4 0 4 3 3 7   CHLORIDE mmol/L 104  --   --  108  --   --   --   --   --   --   --   --  105 105 109* 107   CO2 mmol/L 26  --   --  26  --   --   --   --   --   --   --   --  29 29 28 28   CO2, I-STAT mmol/L  --   --   --   --  25 27 27 26 30 26 25   < >  --   --   --   --    BUN mg/dL 13  --   --  14  --   --   --   --   --   --   --   --  17 15 15 17   CREATININE mg/dL 0 36*  --   --  0 42*  --   --   --   --   -- --   --   --  0 47* 0 40* 0 38* 0 45*   GLUCOSE, ISTAT mg/dl  --   --   --   --  88 172* 220* 147* 140 128 124   < >  --   --   --   --    CALCIUM mg/dL 8 1*  --   --  8 1*  --   --   --   --   --   --   --   --  8 8 9 2 8 2* 8 5   AST U/L  --   --   --   --   --   --   --   --   --   --   --   --  47* 46* 73*  --    ALT U/L  --   --   --   --   --   --   --   --   --   --   --   --  28 30 39  --    ALK PHOS U/L  --   --   --   --   --   --   --   --   --   --   --   --  174* 189* 192*  --    EGFR ml/min/1 73sq m 148  --   --  141  --   --   --   --   --   --   --   --  136 143 146 138    < > = values in this interval not displayed  BMP:  Results from last 7 days   Lab Units 09/11/20  0321 09/10/20  1838 09/10/20  1457 09/10/20  1133 09/10/20  1131 09/10/20  1036 09/10/20  0956 09/10/20  0936 09/10/20  0932  09/10/20  0449 09/09/20  0525 09/07/20  0609 09/05/20  0550   POTASSIUM mmol/L 4 1 4 6 5 8* 4 0  --   --   --   --   --   --  4 1 4 0 4 3 3 7   CHLORIDE mmol/L 104  --   --  108  --   --   --   --   --   --  105 105 109* 107   CO2 mmol/L 26  --   --  26  --   --   --   --   --   --  29 29 28 28   CO2, I-STAT mmol/L  --   --   --   --  25 27 27 26 30   < >  --   --   --   --    BUN mg/dL 13  --   --  14  --   --   --   --   --   --  17 15 15 17   CREATININE mg/dL 0 36*  --   --  0 42*  --   --   --   --   --   --  0 47* 0 40* 0 38* 0 45*   GLUCOSE, ISTAT mg/dl  --   --   --   --  88 172* 220* 147* 140   < >  --   --   --   --    CALCIUM mg/dL 8 1*  --   --  8 1*  --   --   --   --   --   --  8 8 9 2 8 2* 8 5    < > = values in this interval not displayed            No results found for: NTBNP         Results from last 7 days   Lab Units 09/11/20  0321 09/09/20  0525   MAGNESIUM mg/dL 2 2 2 2                 Results from last 7 days   Lab Units 09/10/20  0449   TSH 3RD GENERATON uIU/mL 7 620*   FREE T4 ng/dL 0 95       Results from last 7 days   Lab Units 09/10/20  1838   INR  1 06     Lipid Profile:   No results found for: CHOL  Lab Results   Component Value Date    HDL 45 2020     Lab Results   Component Value Date    LDLCALC 24 2020     Lab Results   Component Value Date    TRIG 266 (H) 2020         Cardiac testing:   Results for orders placed during the hospital encounter of 07/15/20   Echo complete with contrast if indicated    Narrative [address], [address] [facility]  [phone]    Transthoracic Echocardiogram  2D, M-mode, Doppler, and Color Doppler    Study date:  2020    Patient: Shagufta Merritt  MR number: [id number]  Account number: [de-identified]  : 1990  Age: 34 years  Gender: Female  Status: Inpatient  Location: Bedside  Height: 65 in  Weight: 157 7 lb  BP: 109/ 58 mmHg    Indications: Rule out endocarditis  Diagnoses: I38  - Endocarditis, valve unspecified    Sonographer:  CANDY Caal  Primary Physician:  Julius Jones PA-C  Referring Physician:  Irene Cunningham PA-C  Group:  [facility]  Interpreting Physician:  Jonathan Canela MD    SUMMARY    LEFT VENTRICLE:  Systolic function was normal  Ejection fraction was estimated to be 65 %  There were no regional wall motion abnormalities  TRICUSPID VALVE:  There is an approximately 1cm atrial aspect mass on the free wall leaflet, best seen on image 39  There appears to be highly eccentric tricuspid regurgitation and may represent leaflet perforation  There was moderate regurgitation  RECOMMENDATIONS:  If clinically indicated, transesophageal echocardiography could provide additional information  HISTORY: PRIOR HISTORY: Sepsis, dyspnea, heroin abuse, Hep C, former smoker  PROCEDURE: The procedure was performed at the bedside  This was a routine study  The transthoracic approach was used  The study included complete 2D imaging, M-mode, complete spectral Doppler, and color Doppler  The heart rate was 52 bpm,  at the start of the study   Image quality was good  LEFT VENTRICLE: Size was normal  Systolic function was normal  Ejection fraction was estimated to be 65 %  There were no regional wall motion abnormalities  Wall thickness was normal  DOPPLER: Left ventricular diastolic function parameters  were normal     RIGHT VENTRICLE: The size was normal  Systolic function was normal  Wall thickness was normal     LEFT ATRIUM: Size was normal     RIGHT ATRIUM: Size was normal     MITRAL VALVE: Valve structure was normal  There was normal leaflet separation  DOPPLER: The transmitral velocity was within the normal range  There was no evidence for stenosis  There was no significant regurgitation  AORTIC VALVE: The valve was trileaflet  Leaflets exhibited normal thickness and normal cuspal separation  DOPPLER: Transaortic velocity was within the normal range  There was no evidence for stenosis  There was no significant  regurgitation  TRICUSPID VALVE: There is an approximately 1cm atrial aspect mass on the free wall leaflet, best seen on image 39  There appears to be highly eccentric tricuspid regurgitation and may represent leaflet perforation  The valve structure was  normal  There was normal leaflet separation  DOPPLER: The transtricuspid velocity was within the normal range  There was no evidence for stenosis  There was moderate regurgitation  PULMONIC VALVE: Leaflets exhibited normal thickness, no calcification, and normal cuspal separation  DOPPLER: The transpulmonic velocity was within the normal range  There was no significant regurgitation  PERICARDIUM: There was no pericardial effusion  The pericardium was normal in appearance  AORTA: The root exhibited normal size  SYSTEMIC VEINS: IVC: The inferior vena cava was normal in size      SYSTEM MEASUREMENT TABLES    2D  %FS: 41 1 %  Ao Diam: 2 86 cm  EDV(Teich): 81 52 ml  EF(Teich): 72 28 %  ESV(Teich): 22 6 ml  IVSd: 0 66 cm  LA Area: 20 97 cm2  LA Diam: 3 77 cm  LVEDV MOD A4C: 145 97 ml  LVEF MOD A4C: 59 16 %  LVESV MOD A4C: 59 62 ml  LVIDd: 4 27 cm  LVIDs: 2 51 cm  LVLd A4C: 8 93 cm  LVLs A4C: 7 27 cm  LVPWd: 0 62 cm  RA Area: 15 83 cm2  RVIDd: 3 79 cm  SV MOD A4C: 86 36 ml  SV(Teich): 58 92 ml    CW  AV Env  Ti: 325 26 ms  AV MaxPG: 10 7 mmHg  AV VTI: 34 98 cm  AV Vmax: 1 63 m/s  AV Vmean: 1 07 m/s  AV meanP 25 mmHg  TR MaxP 62 mmHg  TR Vmax: 2 27 m/s    MM  TAPSE: 2 76 cm    PW  E': 0 13 m/s  E/E': 8 22  LVOT Env  Ti: 327 57 ms  LVOT VTI: 23 27 cm  LVOT Vmax: 1 11 m/s  LVOT Vmean: 0 71 m/s  LVOT maxP 92 mmHg  LVOT meanP 36 mmHg  MV A Darrell: 0 47 m/s  MV Dec Spink: 4 59 m/s2  MV DecT: 232 16 ms  MV E Darrell: 1 06 m/s  MV E/A Ratio: 2 25  MV PHT: 67 33 ms  MVA By PHT: 3 27 cm2    [facility]    Prepared and electronically signed by    Teodora Dubin, MD  Signed 2020 16:39:57       Results for orders placed during the hospital encounter of 07/15/20   MELISSA    Narrative [facility] , [address] [place] [address]  [phone]    Transesophageal Echocardiogram  2D, Doppler, and Color Doppler    Study date:  2020    Patient: Familia Sandhu  MR number: [id number]  Account number: [de-identified]  : 1990  Age: 34 years  Gender: Female  Status: Inpatient  Location: GI LAB  Height: 65 in  Weight: 158 lb  BP: 119/ 71 mmHg    Indications: 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 valve    Diagnoses: I36 9 - Nonrheumatic tricuspid valve disorder, unspecified, I38  - Endocarditis, valve unspecified    Sonographer:  Al Mccauley BS, RDCS  Primary Physician:  Agustin Ramirez PA-C  Referring Physician:  Melba Hale MD  Group:  [facility]  RN:  Bc Rodriguez  Interpreting Physician:  Melba Hale MD    SUMMARY    LEFT VENTRICLE:  Size was normal   Systolic function was normal  Ejection fraction was estimated to be 65 %    There were no regional wall motion abnormalities  Wall thickness was normal     RIGHT VENTRICLE:  The size was normal   Systolic function was normal     ATRIAL SEPTUM:  There was a small patent foramen ovale  Doppler evaluation was performed  Maumelle Gilboa MITRAL VALVE:  There was trace regurgitation  TRICUSPID VALVE:  There was moderate to severe regurgitation  There was a medium-sized, papillary, mobile vegetation, measuring 11 mm x 10 mm on the tip of the anterior leaflet, on the right atrial aspect  HISTORY: PRIOR HISTORY: Heroine abuse; Pleural effusion; Sepsis; MSSA; Pulmonary emboli; Bacteremia; Abscess of left foot; Acute Pyelonephritis    PROCEDURE: The study was performed in the GI LAB  This was a routine study  The risks and alternatives of the procedure were explained to the patient and informed consent was obtained  The transesophageal approach was used  The study  included complete 2D imaging, complete spectral Doppler, and color Doppler  The heart rate was 80 bpm, at the start of the study  An adult omniplane probe was inserted by the attending cardiologist  Intubated with ease  One intubation  attempt(s)  There was no blood detected on the probe  Image quality was adequate  There were no complications during the procedure  MEDICATIONS: Benzocaine spray, topical to oropharynx, prior to procedure  Anesthesia administered by  anesthesia team     LEFT VENTRICLE: Size was normal  Systolic function was normal  Ejection fraction was estimated to be 65 %  There were no regional wall motion abnormalities  Wall thickness was normal     RIGHT VENTRICLE: The size was normal  Systolic function was normal  Wall thickness was normal     LEFT ATRIUM: Size was normal  No thrombus was identified  APPENDAGE: No thrombus was identified  ATRIAL SEPTUM: There was a small patent foramen ovale  Doppler evaluation was performed  Maumelle Gilboa RIGHT ATRIUM: Size was normal  No thrombus was identified      MITRAL VALVE: Valve structure was normal  There was normal leaflet separation  There was no echocardiographic evidence of vegetation  DOPPLER: There was trace regurgitation  AORTIC VALVE: The valve was trileaflet  Leaflets exhibited normal thickness and normal cuspal separation  There was no echocardiographic evidence of vegetation  DOPPLER: There was no regurgitation  TRICUSPID VALVE: The valve structure was normal  There was normal leaflet separation  There was malcoaptation of the valve leaflets  There was a medium-sized, papillary, mobile vegetation, measuring 11 mm x 10 mm on the tip of the anterior  leaflet, on the right atrial aspect  DOPPLER: There was moderate to severe regurgitation  The regurgitant jet was toward the septum  PULMONIC VALVE: Leaflets exhibited normal thickness, no calcification, and normal cuspal separation  There was no echocardiographic evidence of vegetation  PERICARDIUM: There was no pericardial effusion  The pericardium was normal in appearance  AORTA: The root exhibited normal size  There was no atheroma  There was no evidence for dissection  There was no evidence for aneurysm  [address] Accredited Echocardiography Laboratory    Prepared and electronically signed by    Larry Hill MD  Signed 21-Jul-2020 14:25:41       No procedure found  No results found for this or any previous visit  Imaging: I have personally reviewed pertinent reports  Xr Mandible Panorex ([place] Only)    Result Date: 9/4/2020  Narrative: MANDIBLE - PANOREX INDICATION:  Poor dentition  COMPARISON:  None VIEWS:  XR MANDIBLE PANOREX ([place] ONLY) FINDINGS: Please note sensitivity of panorex exam for detection of dental caries is significantly lower than dedicated bitewing x-rays which are not available in the hospital setting  There is absence of the crown of the right third mandibular molar  The teeth otherwise appear grossly intact within limitations  No discernible periapical lucencies   There is no fracture or pathologic bone lesion  The temporomandibular joints appear grossly intact  Impression: Limitations above  Absence of the crown of the right third mandibular molar  Clinical correlation regarding dental garcía is necessary  If there is clinical concern for dental abscess, CT imaging with IV contrast could be considered  Workstation performed: [id number]     Xr Chest Portable    Result Date: 9/11/2020  Narrative: CHEST INDICATION:   Post Open Heart Surgey  Tricuspid annuloplasty ring repair COMPARISON:  9/10/2020 EXAM PERFORMED/VIEWS:  XR CHEST PORTABLE FINDINGS:  Status patient status post median sternotomy  Mediastinal drainage tube and chest tubes appear unchanged in position  PICC catheter is present with tip in SVC is also dilatation Central venous catheter level of the right atrium  Curvilinear  opacity is noted in the left paraspinal region may represent valve replacement  Cardiomediastinal silhouette appears unremarkable  Left lower lobe consolidation/effusion is unchanged  There is no pneumothorax  Osseous structures appear within normal limits for patient age  Impression: Status post median sternotomy without significant change Workstation performed: [id number]     Xr Chest Pa & Lateral    Result Date: 8/31/2020  Narrative: CHEST INDICATION:   Pleuritic chest pain  COMPARISON:  8/10/2020 EXAM PERFORMED/VIEWS:  XR CHEST PA & LATERAL FINDINGS: Cardiomediastinal silhouette appears unremarkable  A PICC line is present with its tip in the upper portion of the right atrium  There is partially improved aeration at the right lung base, with diminished right pleural disease and diminished right basilar atelectasis or infiltrate  Nodular opacities in the right upper lung zone persist, but have partially diminished  There is mild atelectasis at the left lower lung field  No pneumothorax  Impression: Partially improved appearance of the right lung and right pleural space since earlier    Left lower lung field subsegmental atelectasis  PICC line tip in the upper portion of the right atrium  No other new findings Workstation performed: [id number]     Ct Chest Wo Contrast    Result Date: 9/4/2020  Narrative: CT CHEST WITHOUT IV CONTRAST INDICATION:   endocarditis, evaluate for need for lung decortication  COMPARISON:  Chest CT dated 7/16/2020 and images from abdominal CT dated 8/26/2020 TECHNIQUE: CT examination of the chest was performed without intravenous contrast   Axial, sagittal, and coronal 2D reformatted images were created from the source data and submitted for interpretation  Radiation dose length product (DLP) for this visit:  192 33 mGy-cm   This examination, like all CT scans performed in the [facility], was performed utilizing techniques to minimize radiation dose exposure, including the use of iterative  reconstruction and automated exposure control  FINDINGS: LUNGS: Multifocal consolidative and nodular opacities are mostly decreased in size or resolved with residual atelectasis or scarring and smaller nodular opacities  There are several mostly subcentimeter nodules and ill-defined opacities measuring up to 1 5 x 0 5 cm on image 24 series 3 that are new from prior chest CT but are also favored to be infectious/inflammatory  There is a 1 1 cm right lower lobe nodule on image 87 that was not seen on the July study but is mildly decreased from abdominal CT  Central posterior patent  PLEURA:  Right effusion has resolved  Unremarkable  HEART/GREAT VESSELS:  Unremarkable for patient's age  Left PICC line terminates in the right atrium  MEDIASTINUM AND VLADIMIR:  Stable 1 4 cm subcarinal node  1 cm left hilar node is probably unchanged  CHEST WALL AND LOWER NECK:   Stable prominent axillary nodes with fatty hilum  VISUALIZED STRUCTURES IN THE UPPER ABDOMEN:  Unremarkable  OSSEOUS STRUCTURES:  No acute fracture or destructive osseous lesion       Impression: Right pleural effusion resolved  Mostly improved nodular and consolidative opacities related to septic emboli  Few opacities are new from prior chest CT but similar in appearance to other residual opacities are also likely post infectious/inflammatory  Consider additional 3 month follow-up to ensure resolution  Mild adenopathy unchanged  Workstation performed: [id number]     Ct Spine Lumbar Wo Contrast    Result Date: 8/26/2020  Narrative: CT LUMBAR SPINE INDICATION:   RLE radicular type pain, MSSA endocarditis, evaluate for paraspinal abscess  COMPARISON: None  TECHNIQUE:  Contiguous axial images through the lumbar spine were obtained  Sagittal and coronal reconstructions were performed  Radiation dose length product (DLP) for this visit:  462 72 mGy-cm   This examination, like all CT scans performed in the [facility], was performed utilizing techniques to minimize radiation dose exposure, including the use of iterative  reconstruction and automated exposure control  IMAGE QUALITY:  Diagnostic  FINDINGS: ALIGNMENT:  There are 5 nonrib-bearing lumbar type vertebral bodies  Normal alignment of the lumbar spine  No spondylolisthesis  There is a pars defect on the left at the L5 level with thinning of the right pars  No anterolisthesis  Henery Jurist VERTEBRAL BODIES:  No fracture  No lytic or blastic lesion  DEGENERATIVE CHANGES: Lower Thoracic spine:  Normal lower thoracic disc spaces ] L1-2:  Normal disc height  No herniation  Normal facet joints  No canal or foraminal stenosis  L2-3:  Normal disc height  No herniation  Normal facet joints  No canal or foraminal stenosis  L3-4:  Normal disc height  No herniation  Normal facet joints  No canal or foraminal stenosis  L4-5:  Normal disc height  No herniation  Normal facet joints  No canal or foraminal stenosis  L5-S1:  Left L5 pars defect  Thinning and sclerosis of the right pars without fracture  Minor bulging of the disc, no critical stenosis   PARASPINAL SOFT TISSUES:   Normal      Impression: Normal computed tomography of the lumbar spine  No paraspinal pathology  No evidence for discitis osteomyelitis  Chronic Left L5 pars defect without anterolisthesis  Workstation performed: [id number]     Mri Hip Right W Wo Contrast    Result Date: 8/31/2020  Narrative: MRI RIGHT HIP WITH AND WITHOUT CONTRAST INDICATION:   concern for right hip osteomyeliitis vs abscess  COMPARISON: CT right hip 8/21/2020 MR sequences were obtained of the right hip and pelvis including: Precontrast Localizer, axial T2 fat sat, coronal T1/STIR, cone-down coronal PD of the affected hip, sagittal T2 fat sat, and axial oblique T1 fat sat of the affected hip  Postcontrast:Sagittal and axial oblique T1 fat sat of the affected hip  IV Contrast:  7 mL of gadobutrol injection (MULTI-DOSE) FINDINGS: RIGHT HIP: -JOINT EFFUSION: None  -BONES: Normal marrow signal demonstrated without hip fracture or AVN  -ARTICULAR SURFACE:  Normal  -ACETABULAR LABRUM: Intact  -TROCHANTERIC BURSA: Normal  LEFT HIP: (please note dedicated small field of view images were not made of the left hip joint limiting its evaluation ) -JOINT EFFUSION: None  -BONES: Normal marrow signal demonstrated without hip fracture or AVN  -ARTICULAR SURFACE:  Normal  -ACETABULAR LABRUM: No gross abnormalities although evaluation is very limited  -TROCHANTERIC BURSA: Normal  REST OF PELVIS: -BONES: Mild edema with postcontrast hyperemia in the right iliac and sacral bones abutting the sacroiliac joint  Minimal precontrast T1 hypointense signal in the right iliac bone slightly more hypointense than the contralateral side images 6 through 9 series 3  No osseous destruction  -SI JOINTS AND SYMPHYSIS PUBIS:  Minimal right sacroiliac hyperemic synovitis and effusion  No widening of the SI joint unremarkable pubic symphysis  -VISUALIZED LUMBAR SPINE:  Unremarkable  -MUSCULATURE: Intact with intact hamstring origins bilaterally   Edema and hyperemia of the right iliacus muscle seen on the coronal pre and postcontrast images  Axial images do not fully included this area  -PELVIC SOFT TISSUES: Normal  -SUBCUTANEOUS TISSUES: Normal     Impression: Right sacroiliitis  Given recent history of right iliacus intramuscular fluid collections presumably abscesses, findings are suspicious for sacroiliac septic arthropathy  Right sacral and iliac bone edema and hyperemia abutting the sacroiliac joint without osseous destruction  While this may be reactive changes, early osteomyelitis may also have this appearance  Right iliacus muscle edema and hyperemia seen on coronal large field-of-view imaging attributed to nonspecific myositis  As this is outside the area of interest, it is only partially included on the axial images  No definitive rim-enhancing fluid collection  Consider repeat CT imaging with contrast compared to the most recent CT examination if clinically warranted  This study demonstrates a significant  finding and was documented as such in [facility] for liaison and referring practitioner notification  Workstation performed: [id number]     Vas Carotid Complete Study    Result Date: 9/5/2020  Narrative:  [facility] REPORT CLINICAL: Indications: Patient presents for a general health evaluation secondary to future open heart surgery  Patient is asymptomatic at this time  Operative History: 2019-09-26 Left IR picc line placement Risk Factors The patient has history of DVT and previous smoking (quit 5-10yrs ago)  Clinical Right Pressure:  93/ mm Hg, Left Pressure:  91/ mm Hg  FINDINGS:  Right        PSV  EDV (cm/s)  Direction of Flow  Ratio  Dist  ICA     60          26                      0 64  Mid  ICA      73          36                      0 79  Prox   ICA     77          27                      0 83  Dist CCA      84          24                            Mid CCA       93          19                      1 10  Prox CCA      85          19 Ext Carotid   73           6                      0 79  Prox Vert     59          18  Antegrade                 Subclavian   114           0                             Left         PSV  EDV (cm/s)  Direction of Flow  Ratio  Dist  ICA    106          46                      1 02  Mid  ICA      75          38                      0 72  Prox  ICA     80          32                      0 77  Dist CCA      80          20                            Mid CCA      105          25                      1 03  Prox CCA     101          27                            Ext Carotid   92          17                      0 88  Prox Vert     59          18  Antegrade                 Subclavian   129          15                               CONCLUSION:  Impression RIGHT: There is no evidence of disease throughout the extracranial carotid arteries  Vertebral artery flow is antegrade  There is no significant subclavian artery disease  LEFT: There is no evidence of disease throughout the extracranial carotid arteries  Vertebral artery flow is antegrade  There is no significant subclavian artery disease  Internal carotid artery stenosis determination by consensus criteria from: Blas Baca et al  Carotid Artery Stenosis: Gray-Scale and Doppler US Diagnosis - Society of Radiologists in [address] [place] [address], Radiology 2003; [id number]  SIGNATURE: Electronically Signed by: Keke Arreguin MD on 2020-09-05 12:32:48 AM    Vas Upper Limb Venous Duplex Scan, Unilateral/limited    Result Date: 8/23/2020  Narrative:  [facility] REPORT CLINICAL: Indications: Patient presents with left upper extremity pain in area of PICC    FINDINGS:  Left         Impression              Thrombus  Axillary     Non Occlusive Thrombus            Bas Mid Arm                          Acute     Subclavian   Non Occlusive Thrombus               CONCLUSION:  Impression LEFT UPPER LIMB: Acute non-occlusive deep vein thrombosis is noted in the axillary and subclavian veins  Acute superficial thrombophlebitis noted in the basilic vein  Doppler evaluation shows a normal response to augmentation maneuvers  Technically limited study secondary to patient noncooperation / refusal    Technical findings sent to Dr Markell Carrasco via [facility] Text  SIGNATURE: Electronically Signed by: Shailesh Mueller on 2020-08-23 05:57:42 PM    Xr Chest Portable Icu    Result Date: 9/10/2020  Narrative: CHEST INDICATION:   S/P open heart  COMPARISON:  Compared with 8/31/2020 EXAM PERFORMED/VIEWS:  XR CHEST PORTABLE ICU FINDINGS:  Right Catheter tip in the region of the main pulmonary artery  Right neck catheter in the right atrium  Pericardial and right chest tube seen  Left subclavian catheter tip in the right atrium  Cardiac silhouette normal  Poor inspiratory film  Prominent vascular markings  Bilateral trace apical pneumothorax seen  Osseous structures appear within normal limits for patient age  Impression: Possible mild congestive changes  Bilateral trace apical pneumothorax  The study was marked in [facility] for immediate notification  Workstation performed: [id number]     Ct Abdomen Pelvis W Contrast    Result Date: 8/26/2020  Narrative: CT ABDOMEN AND PELVIS WITH IV CONTRAST INDICATION:   Abdominal source of infection  Endocarditis  Bacteremia  COMPARISON:  July 15, 2020 TECHNIQUE:  CT examination of the abdomen and pelvis was performed  Axial, sagittal, and coronal 2D reformatted images were created from the source data and submitted for interpretation  Radiation dose length product (DLP) for this visit:  346 74 mGy-cm   This examination, like all CT scans performed in the [place], was performed utilizing techniques to minimize radiation dose exposure, including the use of iterative  reconstruction and automated exposure control  IV Contrast:  100 mL of iohexol (OMNIPAQUE) Enteric Contrast:  Enteric contrast was administered   FINDINGS: ABDOMEN LOWER CHEST: There are patchy alveolar type consolidative densities in the lung bases including in the left base on image 3 of series 2m in the right base on image 6 of series 2 and in the right costophrenic angle on image 11 of series 2 where a nodular subpleural density demonstrates apparent cavitation  Bibasilar atelectasis is noted  LIVER/BILIARY TREE:  Liver is enlarged  No focal hepatic lesions are noted  Hepatic contours are within normal limits  No biliary dilatation  GALLBLADDER:  No calcified gallstones  No pericholecystic inflammatory change  SPLEEN:  The spleen is enlarged, measuring  up to 15 cm, similar from previous examination  No suspicious splenic lesion noted  PANCREAS:  Unremarkable  ADRENAL GLANDS:  Unremarkable  KIDNEYS/URETERS:  Unremarkable  No hydronephrosis  STOMACH AND BOWEL:  There is a larger than typical volume of stool in the large bowel consistent with constipation  Stomach and small bowel appear unremarkable  APPENDIX:  No findings to suggest appendicitis  ABDOMINOPELVIC CAVITY:  No ascites  No pneumoperitoneum  No lymphadenopathy  VESSELS:  Unremarkable for patient's age  PELVIS REPRODUCTIVE ORGANS:  Unremarkable for patient's age  URINARY BLADDER:  Unremarkable  ABDOMINAL WALL/INGUINAL REGIONS:  Unremarkable  OSSEOUS STRUCTURES:  No acute fracture or destructive osseous lesion  Impression: No intra-abdominal or pelvic source of infection  Hepatosplenomegaly  Large volume of stool in the colon consistent with some element of constipation  Patchy pulmonary densities at the lung bases specialists for septic emboli  This examination was marked "immediate notification" in Epic in order to begin the standard process by which the radiology reading room liaison alerts the referring practitioner     Workstation performed: [id number]     Ct Lower Extremity W Contrast Right    Result Date: 8/21/2020  Narrative: CT right hip with IV contrast INDICATION: PAIN worsening right hip/gluteal pain, septic emboli to area on prior CT, evaluate for progression  Ambrosio Neat note yesterday reviewed, patient with staph aureus bacteremia COMPARISON: Right lower extremity CT 8/15/20 TECHNIQUE: CT examination of the right hip was performed  This examination, like all CT scans performed in the [facility], was performed utilizing techniques to minimize radiation dose exposure, including the use of iterative reconstruction and automated exposure control software  Sagittal and coronal two dimensional reconstructed images were also submitted for interpretation  Rad dose  373 95 mGy-cm FINDINGS: OSSEOUS STRUCTURES:  No fracture, dislocation or destructive osseous lesion  VISUALIZED MUSCULATURE:  Unremarkable  SOFT TISSUES:  Overall, the right iliacus muscle appears to be of smaller bulk, possibly owing to decreased inflammatory changes  Again noted are too small collections deep within the iliacus muscle measuring up to about 9 mm on series 3/17, and up to 8  mm on series 3/21, both of these show interval decrease in size, and are close to the right sacroiliac joint  OTHER PERTINENT FINDINGS:  None  Impression: Interval decrease in size of small collections within the right iliacus muscle  Regarding etiology, in addition to septic emboli, given the proximity to the right sacroiliac joint, consider right sacroiliac septic arthritis  The study was marked in [facility] for immediate notification  Workstation performed: [id number]     Ct Lower Extremity W Contrast Right    Result Date: 8/16/2020  Narrative: CT right lower extremity with IV contrast INDICATION: PAIN  COMPARISON: None  TECHNIQUE: CT examination of the right iliac bone through the tibial metaphysis was performed    This examination, like all CT scans performed in the [facility], was performed utilizing techniques to minimize radiation dose exposure, including the use of iterative reconstruction and automated exposure control software  Sagittal and coronal two dimensional reconstructed images were also submitted for interpretation  IV Contrast: 100 mL of iohexol (OMNIPAQUE)  350 Rad dose  1430 27 mGy-cm FINDINGS: OSSEOUS STRUCTURES:  No fracture, dislocation or destructive osseous lesion  VISUALIZED MUSCULATURE:  Small multifocal hypodensities with minimal peripheral hyperemia in the posterior iliacus muscle approximately at the level of the SI joint: 6 x 8 x 13 mm, images 32 through 43 series 3 and image 46 series 400  7 x 10 x 20 mm, images 39 through 53 series 3 and image 42 series 400  SOFT TISSUES:  Minimal free fluid in the cul-de-sac and right adnexa within physiologic limits  OTHER PERTINENT FINDINGS:  None  Impression: 2 small abscesses in the right posterior iliacus muscle approximately at the level of the upper to mid SI joint  Normal CT appearance of the SI joint itself  This study demonstrates a significant  finding and was documented as such [facility] for liaison and referring practitioner notification  Workstation performed: [id number]     Ir Picc Line Placement Double Lumen    Result Date: 8/26/2020  Narrative: IR PERIPHERALLY INSERTED CENTRAL CATHETER EXCHANGE (PICC EXCHANGE)  : Attending(s): Becca POWELL  Assistant(s): Sherman JUAREZ  INDICATION: Bacteremia COMPLICATIONS: No immediate complications  CONTRAST: None FLUOROSCOPY TIME/DOSE: 0 3 minutes 3 mGy ANESTHESIA/SEDATION Local anesthesia  DESCRIPTION OF PROCEDURE: The risks, benefits and alternatives of the procedure were fully discussed  All questions were answered  Informed consent for the procedure was obtained from the patient's parents and time-out was performed prior to the procedure    The indwelling left arm peripherally inserted central venous catheter (PICC) site was prepared and draped using all elements of maximal sterile barrier technique including sterile gloves, sterile gown, cap, mask, large sterile sheet, sterile ultrasound probe cover, hand hygiene and cutaneous antisepsis with 2% chlorhexidine  Local anesthesia was administered  The indwelling PICC was exchanged over a wire for a new PICC with fluoroscopic guidance  Catheter tip location was fluoroscopically verified and image archived  Tip location: cavoatrial junction Catheter size: 5 [facility] double lumen Catheter intravascular length: 43 cm Catheter flush: Saline  Closure The catheter was secured and a sterile bandage was applied  Catheter securement technique: Stat-Lock     Impression: Successful exchange of left arm double-lumen power injectable PICC  Neris POWELL , attest that I was not personally present during the procedure but was immediately available for assistance if needed  I reviewed the stored images  Plan: The catheter may be used immediately  Workstation performed: [id number]           Counseling / Coordination of Care  Total floor / unit time spent today 45 minutes  Greater than 50% of total time was spent with the patient and / or family counseling and / or coordination of care  A description of the counseling / coordination of care: Review of history, current assessment, development of a plan  Code Status: Level 1 - Full Code    ** Please Note: Dragon 360 Dictation voice to text software may have been used in the creation of this document   **

## 2020-09-11 NOTE — PROGRESS NOTES
Progress Note - Acute Pain Service    Jennifer Salazar 34 y o  female MRN: [id number]  Unit/Bed#: [facility] 421-01 Encounter: [id number]      Assessment:   Principal Problem:    Bacteremia due to Staphylococcus aureus  Active Problems:    Acute bacterial endocarditis    Septic embolism (HCC)    Bipolar 1 disorder (HCC)    Right hip pain    Intravenous drug abuse ([address] )    DVT of axillary vein, acute left (HCC)    Transaminitis    Rash    Anemia    Hyperphosphatemia    S/P TVR (tricuspid valve repair)      Plan:   · Continue acetaminophen 975 mg p o  q 8 hours scheduled  · Continue ketamine infusion at 0 2 mg/kg/HR  · Continue oxycodone 15 mg p o  q 4 hours p r n  severe pain  · Continue morphine 4 mg IV q 2 hours p r n  breakthrough pain  · Continue duloxetine 60 mg p o  daily scheduled  · Continue lorazepam 1 mg IV q 2 hours p r n  anxiety  · Continue gabapentin 100 mg p o  hs   Plan to discontinue on 9/14/20  · Continue lidocaine 5% patch, 2 patches for 12 hours daily  ·   · Suggest attempt to wean morphine to 4 mg IV q 3 hours p r n  on 9/13/20  Suggest wean lorazepam to 1 mg IV q 4 hours p r n  on 9/12/20  APS will continue to follow; please contact APS ( [phone]) with any further questions    Pain History  Current pain location(s): Anterior chest wall, thoracic back  Pain Scale:   10/10  Quality:  Unable to describe  24 hour history:  Postop day 1 status post tricuspid valve repair, sternotomy  Overnight, patient complained of severe pain with significant anxiety  Had episodes where she would awaken complain of pain and fall asleep again  Continue to do that this morning  On my evaluation, patient appeared more anxious and Ativan was initiated IV  Patient aware that the plan will be to wean Ativan and morphine over the weekend        Opioid requirement previous 24 hours:  Oxycodone 60 mg p o , hydromorphone 3 mg IV, Morphine 12 mg IV, Fentanyl 400 mg IV, fentanyl 1000 mg IV via infusion throughout surgery      Meds/Allergies   all current active meds have been reviewed, current meds:   Current Facility-Administered Medications   Medication Dose Route Frequency    acetaminophen (TYLENOL) tablet 650 mg  650 mg Oral Q6H PRN    acetaminophen (TYLENOL) tablet 975 mg  975 mg Oral Q8H    amiodarone tablet 200 mg  200 mg Oral Q8H [facility]    aspirin chewable tablet 81 mg  81 mg Oral Daily    bisacodyl (DULCOLAX) rectal suppository 10 mg  10 mg Rectal Daily PRN    calcium acetate (PHOSLO) capsule 667 mg  667 mg Oral TID With Meals    calcium carbonate (TUMS) chewable tablet 1,000 mg  1,000 mg Oral Daily PRN    dicyclomine (BENTYL) capsule 10 mg  10 mg Oral TID PRN    docusate sodium (COLACE) capsule 100 mg  100 mg Oral BID    DULoxetine (CYMBALTA) delayed release capsule 60 mg  60 mg Oral Daily    gabapentin (NEURONTIN) capsule 100 mg  100 mg Oral HS    glycopyrrolate (ROBINUL) injection 0 2 mg  0 2 mg Intravenous Q4H PRN    haloperidol lactate (HALDOL) injection 2 mg  2 mg Intramuscular Q30 Min PRN    hydrocortisone 1 % cream   Topical 4x Daily PRN    insulin lispro (HumaLOG) 100 units/mL subcutaneous injection 1-5 Units  1-5 Units Subcutaneous TID AC    iohexol (OMNIPAQUE) 240 MG/ML solution 50 mL  50 mL Oral 90 min pre-procedure    ketamine 250 mg (STANDARD CONCENTRATION) IV in sodium chloride 0 9% 250 mL  0 2 mg/kg/hr Intravenous Continuous    lidocaine (LIDODERM) 5 % patch 2 patch  2 patch Topical Daily    LORazepam (ATIVAN) injection 1 mg  1 mg Intravenous Q2H PRN    magnesium citrate (CITROMA) oral solution 296 mL  296 mL Oral Daily PRN    melatonin tablet 6 mg  6 mg Oral HS PRN    methylnaltrexone (RELISTOR) subcutaneous injection 12 mg  12 mg Subcutaneous Q48H PRN    metoprolol tartrate (LOPRESSOR) partial tablet 12 5 mg  12 5 mg Oral Q12H JEFF    miconazole 2 % cream   Topical BID    morphine (PF) 4 mg/mL injection 4 mg  4 mg Intravenous Q2H PRN    mupirocin (BACTROBAN) 2 % nasal ointment 1 application  1 application Nasal [id number] [address]    nystatin (MYCOSTATIN) cream   Topical BID    ondansetron (ZOFRAN) injection 4 mg  4 mg Intravenous Q6H PRN    oxyCODONE (ROXICODONE) IR tablet 15 mg  15 mg Oral Q4H PRN    pantoprazole (PROTONIX) EC tablet 40 mg  40 mg Oral Daily    polyethylene glycol (MIRALAX) packet 17 g  17 g Oral Daily    potassium chloride (K-DUR,KLOR-CON) CR tablet 20 mEq  20 mEq Oral Daily    saccharomyces boulardii (FLORASTOR) capsule 250 mg  250 mg Oral BID    senna (SENOKOT) tablet 8 6 mg  1 tablet Oral BID    and PTA meds:   None       Allergies   Allergen Reactions    Cat Hair Extract     Dog Epithelium     Latex     Pollen Extract        Objective     Temp:  [97 7 °F (36 5 °C)-99 3 °F (37 4 °C)] 99 °F (37 2 °C)  HR:  [] 124  Resp:  [13-38] 18  BP: ()/(53-91) 147/91  Arterial Line BP: ()/(46-78) 132/74    Physical Exam  Vitals signs and nursing note reviewed  Constitutional:       General: She is awake  She is in acute distress  Appearance: She is not toxic-appearing or diaphoretic  Eyes:      Pupils: Pupils are equal, round, and reactive to light  Cardiovascular:      Rate and Rhythm: Regular rhythm  Tachycardia present  Skin:     General: Skin is warm and dry  Neurological:      General: No focal deficit present  Mental Status: She is alert and oriented to person, place, and time  GCS: GCS eye subscore is 4  GCS verbal subscore is 5  GCS motor subscore is 6  Psychiatric:         Attention and Perception: She is inattentive  Mood and Affect: Mood is anxious  Speech: Speech normal          Behavior: Behavior is agitated           Lab Results:   Results from last 7 days   Lab Units 09/11/20  0321   WBC Thousand/uL 7 18   HEMOGLOBIN g/dL 8 3*   HEMATOCRIT % 26 0*   PLATELETS Thousands/uL 154      Results from last 7 days   Lab Units 09/11/20  0321  09/10/20  1131  09/10/20  0449   POTASSIUM mmol/L 4 1 < >  --   --  4 1   CHLORIDE mmol/L 104   < >  --   --  105   CO2 mmol/L 26   < >  --   --  29   CO2, I-STAT mmol/L  --   --  25   < >  --    BUN mg/dL 13   < >  --   --  17   CREATININE mg/dL 0 36*   < >  --   --  0 47*   CALCIUM mg/dL 8 1*   < >  --   --  8 8   ALK PHOS U/L  --   --   --   --  174*   ALT U/L  --   --   --   --  28   AST U/L  --   --   --   --  47*   GLUCOSE, ISTAT mg/dl  --   --  88   < >  --     < > = values in this interval not displayed  Imaging Studies: I have personally reviewed pertinent reports  EKG, Pathology, and Other Studies: I have personally reviewed pertinent reports  Counseling / Coordination of Care  Total floor / unit time spent today 30 minutes  Greater than 50% of total time was spent with the patient and / or family counseling and / or coordination of care  A description of the counseling / coordination of care:  Patient interview, physical examination, review of medical record, review of imaging and laboratory data, development of pain management plan, discussion of pain management plan with patient and primary service      Lajuan Mcardle, PA-C

## 2020-09-12 LAB
ABO GROUP BLD BPU: NORMAL
ANION GAP SERPL CALCULATED.3IONS-SCNC: 7 MMOL/L (ref 4–13)
BPU ID: NORMAL
BUN SERPL-MCNC: 8 MG/DL (ref 5–25)
CALCIUM SERPL-MCNC: 8.3 MG/DL (ref 8.3–10.1)
CHLORIDE SERPL-SCNC: 104 MMOL/L (ref 100–108)
CO2 SERPL-SCNC: 27 MMOL/L (ref 21–32)
CREAT SERPL-MCNC: 0.37 MG/DL (ref 0.6–1.3)
CROSSMATCH: NORMAL
ERYTHROCYTE [DISTWIDTH] IN BLOOD BY AUTOMATED COUNT: 15.1 % (ref 11.6–15.1)
GFR SERPL CREATININE-BSD FRML MDRD: 147 ML/MIN/1.73SQ M
GLUCOSE SERPL-MCNC: 103 MG/DL (ref 65–140)
GLUCOSE SERPL-MCNC: 109 MG/DL (ref 65–140)
GLUCOSE SERPL-MCNC: 113 MG/DL (ref 65–140)
GLUCOSE SERPL-MCNC: 122 MG/DL (ref 65–140)
GLUCOSE SERPL-MCNC: 123 MG/DL (ref 65–140)
HCT VFR BLD AUTO: 20.8 % (ref 34.8–46.1)
HCT VFR BLD AUTO: 24.2 % (ref 34.8–46.1)
HGB BLD-MCNC: 6.4 G/DL (ref 11.5–15.4)
HGB BLD-MCNC: 7.5 G/DL (ref 11.5–15.4)
MAGNESIUM SERPL-MCNC: 1.8 MG/DL (ref 1.6–2.6)
MCH RBC QN AUTO: 27.6 PG (ref 26.8–34.3)
MCHC RBC AUTO-ENTMCNC: 30.8 G/DL (ref 31.4–37.4)
MCV RBC AUTO: 90 FL (ref 82–98)
PHOSPHATE SERPL-MCNC: 3.4 MG/DL (ref 2.7–4.5)
PLATELET # BLD AUTO: 115 THOUSANDS/UL (ref 149–390)
PMV BLD AUTO: 9 FL (ref 8.9–12.7)
POTASSIUM SERPL-SCNC: 3.6 MMOL/L (ref 3.5–5.3)
RBC # BLD AUTO: 2.32 MILLION/UL (ref 3.81–5.12)
SODIUM SERPL-SCNC: 138 MMOL/L (ref 136–145)
UNIT DISPENSE STATUS: NORMAL
UNIT PRODUCT CODE: NORMAL
UNIT RH: NORMAL
WBC # BLD AUTO: 6.08 THOUSAND/UL (ref 4.31–10.16)

## 2020-09-12 PROCEDURE — 80048 BASIC METABOLIC PNL TOTAL CA: CPT | Performed by: PHYSICIAN ASSISTANT

## 2020-09-12 PROCEDURE — 97535 SELF CARE MNGMENT TRAINING: CPT

## 2020-09-12 PROCEDURE — 82948 REAGENT STRIP/BLOOD GLUCOSE: CPT

## 2020-09-12 PROCEDURE — 85014 HEMATOCRIT: CPT | Performed by: PHYSICIAN ASSISTANT

## 2020-09-12 PROCEDURE — 85018 HEMOGLOBIN: CPT | Performed by: PHYSICIAN ASSISTANT

## 2020-09-12 PROCEDURE — 99024 POSTOP FOLLOW-UP VISIT: CPT | Performed by: THORACIC SURGERY (CARDIOTHORACIC VASCULAR SURGERY)

## 2020-09-12 PROCEDURE — 99024 POSTOP FOLLOW-UP VISIT: CPT | Performed by: PHYSICIAN ASSISTANT

## 2020-09-12 PROCEDURE — 85027 COMPLETE CBC AUTOMATED: CPT | Performed by: PHYSICIAN ASSISTANT

## 2020-09-12 PROCEDURE — 83735 ASSAY OF MAGNESIUM: CPT | Performed by: PHYSICIAN ASSISTANT

## 2020-09-12 PROCEDURE — 97116 GAIT TRAINING THERAPY: CPT

## 2020-09-12 PROCEDURE — 84100 ASSAY OF PHOSPHORUS: CPT | Performed by: STUDENT IN AN ORGANIZED HEALTH CARE EDUCATION/TRAINING PROGRAM

## 2020-09-12 PROCEDURE — 97168 OT RE-EVAL EST PLAN CARE: CPT

## 2020-09-12 RX ORDER — MIRTAZAPINE 30 MG/1
30 TABLET, FILM COATED ORAL
Status: DISCONTINUED | OUTPATIENT
Start: 2020-09-12 | End: 2020-09-12

## 2020-09-12 RX ORDER — CEFAZOLIN SODIUM 2 G/50ML
2000 SOLUTION INTRAVENOUS EVERY 8 HOURS
Status: COMPLETED | OUTPATIENT
Start: 2020-09-12 | End: 2020-09-13

## 2020-09-12 RX ORDER — POTASSIUM CHLORIDE 750 MG/1
10 TABLET, EXTENDED RELEASE ORAL DAILY
Status: DISCONTINUED | OUTPATIENT
Start: 2020-09-12 | End: 2020-09-18

## 2020-09-12 RX ORDER — POTASSIUM CHLORIDE 20 MEQ/1
40 TABLET, EXTENDED RELEASE ORAL ONCE
Status: COMPLETED | OUTPATIENT
Start: 2020-09-12 | End: 2020-09-12

## 2020-09-12 RX ORDER — FUROSEMIDE 10 MG/ML
20 INJECTION INTRAMUSCULAR; INTRAVENOUS DAILY
Status: DISCONTINUED | OUTPATIENT
Start: 2020-09-12 | End: 2020-09-18

## 2020-09-12 RX ORDER — WARFARIN SODIUM 5 MG/1
5 TABLET ORAL
Status: COMPLETED | OUTPATIENT
Start: 2020-09-12 | End: 2020-09-12

## 2020-09-12 RX ORDER — OLANZAPINE 5 MG/1
5 TABLET ORAL
Status: DISCONTINUED | OUTPATIENT
Start: 2020-09-12 | End: 2020-09-28 | Stop reason: HOSPADM

## 2020-09-12 RX ORDER — LANOLIN ALCOHOL/MO/W.PET/CERES
6 CREAM (GRAM) TOPICAL
Status: DISCONTINUED | OUTPATIENT
Start: 2020-09-12 | End: 2020-09-28 | Stop reason: HOSPADM

## 2020-09-12 RX ORDER — OLANZAPINE 5 MG/1
5 TABLET ORAL
Status: DISCONTINUED | OUTPATIENT
Start: 2020-09-12 | End: 2020-09-12

## 2020-09-12 RX ORDER — FONDAPARINUX SODIUM 2.5 MG/.5ML
2.5 INJECTION SUBCUTANEOUS EVERY 24 HOURS
Status: DISCONTINUED | OUTPATIENT
Start: 2020-09-12 | End: 2020-09-24 | Stop reason: ALTCHOICE

## 2020-09-12 RX ORDER — MIRTAZAPINE 30 MG/1
30 TABLET, FILM COATED ORAL
Status: DISCONTINUED | OUTPATIENT
Start: 2020-09-12 | End: 2020-09-28 | Stop reason: HOSPADM

## 2020-09-12 RX ADMIN — ACETAMINOPHEN 975 MG: 325 TABLET, FILM COATED ORAL at 18:30

## 2020-09-12 RX ADMIN — Medication 250 MG: at 17:26

## 2020-09-12 RX ADMIN — OXYCODONE HYDROCHLORIDE 15 MG: 10 TABLET ORAL at 11:18

## 2020-09-12 RX ADMIN — LORAZEPAM 1 MG: 2 INJECTION INTRAMUSCULAR; INTRAVENOUS at 09:45

## 2020-09-12 RX ADMIN — WARFARIN SODIUM 5 MG: 5 TABLET ORAL at 17:26

## 2020-09-12 RX ADMIN — MELATONIN 6 MG: at 20:15

## 2020-09-12 RX ADMIN — Medication 250 MG: at 08:50

## 2020-09-12 RX ADMIN — OXYCODONE HYDROCHLORIDE 15 MG: 10 TABLET ORAL at 19:13

## 2020-09-12 RX ADMIN — CALCIUM ACETATE 667 MG: 667 CAPSULE ORAL at 17:26

## 2020-09-12 RX ADMIN — CEFAZOLIN SODIUM 2000 MG: 2 SOLUTION INTRAVENOUS at 18:27

## 2020-09-12 RX ADMIN — CALCIUM ACETATE 667 MG: 667 CAPSULE ORAL at 08:50

## 2020-09-12 RX ADMIN — MIRTAZAPINE 30 MG: 30 TABLET, FILM COATED ORAL at 20:15

## 2020-09-12 RX ADMIN — CALCIUM ACETATE 667 MG: 667 CAPSULE ORAL at 11:18

## 2020-09-12 RX ADMIN — LORAZEPAM 1 MG: 2 INJECTION INTRAMUSCULAR; INTRAVENOUS at 13:05

## 2020-09-12 RX ADMIN — ACETAMINOPHEN 975 MG: 325 TABLET, FILM COATED ORAL at 02:46

## 2020-09-12 RX ADMIN — HYDROMORPHONE HYDROCHLORIDE 2 MG: 1 INJECTION, SOLUTION INTRAMUSCULAR; INTRAVENOUS; SUBCUTANEOUS at 16:52

## 2020-09-12 RX ADMIN — CEFAZOLIN SODIUM 2000 MG: 2 SOLUTION INTRAVENOUS at 11:19

## 2020-09-12 RX ADMIN — LORAZEPAM 1 MG: 2 INJECTION INTRAMUSCULAR; INTRAVENOUS at 22:34

## 2020-09-12 RX ADMIN — DOCUSATE SODIUM 100 MG: 100 CAPSULE, LIQUID FILLED ORAL at 17:26

## 2020-09-12 RX ADMIN — FUROSEMIDE 20 MG: 10 INJECTION, SOLUTION INTRAMUSCULAR; INTRAVENOUS at 11:19

## 2020-09-12 RX ADMIN — LORAZEPAM 1 MG: 2 INJECTION INTRAMUSCULAR; INTRAVENOUS at 03:09

## 2020-09-12 RX ADMIN — AMIODARONE HYDROCHLORIDE 200 MG: 200 TABLET ORAL at 05:53

## 2020-09-12 RX ADMIN — MUPIROCIN 1 APPLICATION: 20 OINTMENT TOPICAL at 08:57

## 2020-09-12 RX ADMIN — POTASSIUM CHLORIDE 20 MEQ: 1500 TABLET, EXTENDED RELEASE ORAL at 08:50

## 2020-09-12 RX ADMIN — HYDROMORPHONE HYDROCHLORIDE 2 MG: 1 INJECTION, SOLUTION INTRAMUSCULAR; INTRAVENOUS; SUBCUTANEOUS at 05:53

## 2020-09-12 RX ADMIN — Medication 0.2 MG/KG/HR: at 11:46

## 2020-09-12 RX ADMIN — LIDOCAINE 5% 2 PATCH: 700 PATCH TOPICAL at 06:16

## 2020-09-12 RX ADMIN — HYDROMORPHONE HYDROCHLORIDE 2 MG: 1 INJECTION, SOLUTION INTRAMUSCULAR; INTRAVENOUS; SUBCUTANEOUS at 02:46

## 2020-09-12 RX ADMIN — DOCUSATE SODIUM 100 MG: 100 CAPSULE, LIQUID FILLED ORAL at 08:50

## 2020-09-12 RX ADMIN — POLYETHYLENE GLYCOL 3350 17 G: 17 POWDER, FOR SOLUTION ORAL at 08:51

## 2020-09-12 RX ADMIN — MUPIROCIN 1 APPLICATION: 20 OINTMENT TOPICAL at 20:14

## 2020-09-12 RX ADMIN — Medication 12.5 MG: at 08:50

## 2020-09-12 RX ADMIN — OLANZAPINE 5 MG: 5 TABLET, FILM COATED ORAL at 20:13

## 2020-09-12 RX ADMIN — HYDROMORPHONE HYDROCHLORIDE 2 MG: 1 INJECTION, SOLUTION INTRAMUSCULAR; INTRAVENOUS; SUBCUTANEOUS at 22:34

## 2020-09-12 RX ADMIN — HYDROMORPHONE HYDROCHLORIDE 2 MG: 1 INJECTION, SOLUTION INTRAMUSCULAR; INTRAVENOUS; SUBCUTANEOUS at 20:16

## 2020-09-12 RX ADMIN — PANTOPRAZOLE SODIUM 40 MG: 40 TABLET, DELAYED RELEASE ORAL at 08:56

## 2020-09-12 RX ADMIN — ASPIRIN 81 MG CHEWABLE TABLET 81 MG: 81 TABLET CHEWABLE at 08:50

## 2020-09-12 RX ADMIN — LORAZEPAM 1 MG: 2 INJECTION INTRAMUSCULAR; INTRAVENOUS at 00:57

## 2020-09-12 RX ADMIN — DULOXETINE HYDROCHLORIDE 60 MG: 60 CAPSULE, DELAYED RELEASE ORAL at 08:50

## 2020-09-12 RX ADMIN — POTASSIUM CHLORIDE 40 MEQ: 1500 TABLET, EXTENDED RELEASE ORAL at 15:17

## 2020-09-12 RX ADMIN — SENNOSIDES 8.6 MG: 8.6 TABLET ORAL at 17:26

## 2020-09-12 RX ADMIN — HYDROMORPHONE HYDROCHLORIDE 2 MG: 1 INJECTION, SOLUTION INTRAMUSCULAR; INTRAVENOUS; SUBCUTANEOUS at 00:28

## 2020-09-12 RX ADMIN — METOPROLOL TARTRATE 25 MG: 25 TABLET, FILM COATED ORAL at 20:18

## 2020-09-12 RX ADMIN — AMIODARONE HYDROCHLORIDE 200 MG: 200 TABLET ORAL at 21:00

## 2020-09-12 RX ADMIN — LORAZEPAM 1 MG: 2 INJECTION INTRAMUSCULAR; INTRAVENOUS at 20:14

## 2020-09-12 RX ADMIN — ACETAMINOPHEN 975 MG: 325 TABLET, FILM COATED ORAL at 11:18

## 2020-09-12 RX ADMIN — FONDAPARINUX SODIUM 2.5 MG: 2.5 INJECTION, SOLUTION SUBCUTANEOUS at 11:19

## 2020-09-12 RX ADMIN — LORAZEPAM 1 MG: 2 INJECTION INTRAMUSCULAR; INTRAVENOUS at 17:26

## 2020-09-12 RX ADMIN — OXYCODONE HYDROCHLORIDE 15 MG: 10 TABLET ORAL at 15:17

## 2020-09-12 RX ADMIN — POTASSIUM CHLORIDE 10 MEQ: 750 TABLET, EXTENDED RELEASE ORAL at 11:19

## 2020-09-12 RX ADMIN — OXYCODONE HYDROCHLORIDE 15 MG: 10 TABLET ORAL at 05:52

## 2020-09-12 RX ADMIN — GABAPENTIN 100 MG: 100 CAPSULE ORAL at 20:14

## 2020-09-12 RX ADMIN — SENNOSIDES 8.6 MG: 8.6 TABLET ORAL at 08:50

## 2020-09-12 RX ADMIN — HYDROMORPHONE HYDROCHLORIDE 2 MG: 1 INJECTION, SOLUTION INTRAMUSCULAR; INTRAVENOUS; SUBCUTANEOUS at 08:48

## 2020-09-12 RX ADMIN — HYDROMORPHONE HYDROCHLORIDE 2 MG: 1 INJECTION, SOLUTION INTRAMUSCULAR; INTRAVENOUS; SUBCUTANEOUS at 11:32

## 2020-09-12 RX ADMIN — LORAZEPAM 1 MG: 2 INJECTION INTRAMUSCULAR; INTRAVENOUS at 05:53

## 2020-09-12 RX ADMIN — AMIODARONE HYDROCHLORIDE 200 MG: 200 TABLET ORAL at 15:17

## 2020-09-12 NOTE — PROGRESS NOTES
Progress Note - Cardiothoracic Surgery   Patricia Law 34 y o  female MRN: [id number]  Unit/Bed#: [facility] 421-01 Encounter: [id number]    Severe Tricuspid Regurgitation, Tricuspid Valve Endocarditis  S/P tricuspid valve repair; POD # 2      24 Hour Events: Transferred out of ICU yesterday  On no cardiac infusions  Acute Pain following for post-op pain management    Seen by nephrology for hyperphosphatemia      Medications:   Scheduled Meds:  Current Facility-Administered Medications   Medication Dose Route Frequency Provider Last Rate    acetaminophen  650 mg Oral Q6H PRN Olivier Salem, PA-C      acetaminophen  975 mg Oral Q8H Olivier Salem, PA-C      amiodarone  200 mg Oral Q8H [address] Olivier Salem, PA-C      aspirin  81 mg Oral Daily Olivier Salem, PA-C      bisacodyl  10 mg Rectal Daily PRN Olivier Salem, PA-C      calcium acetate  667 mg Oral TID With Meals Olivier Salem, PA-C      calcium carbonate  1,000 mg Oral Daily PRN Olivier Salem, PA-C      dicyclomine  10 mg Oral TID PRN Olivier Salem, PA-C      docusate sodium  100 mg Oral BID Olivier Salem, PA-C      DULoxetine  60 mg Oral Daily Olivier Salem, PA-C      gabapentin  100 mg Oral HS Olivier Salem, PA-C      glycopyrrolate  0 2 mg Intravenous Q4H PRN Olivier Salem, PA-C      haloperidol lactate  2 mg Intramuscular Q30 Min PRN Olivier Salem, PA-C      hydrocortisone   Topical 4x Daily PRN Olivier Salem, PA-C      HYDROmorphone  2 mg Intravenous Q2H PRN Lauralyn Buffys, PA-C      insulin lispro  1-5 Units Subcutaneous TID AC Deisy Judd PA-C      iohexol  50 mL Oral 90 min pre-procedure Olivier Salem, PA-C      ketamine  0 2 mg/kg/hr Intravenous Continuous Olivier Salem, PA-C 0 2 mg/kg/hr (09/11/20 9732)    lidocaine  2 patch Topical Daily Deisy Judd, PA-C      LORazepam  1 mg Intravenous Q2H PRN Olivier Salem, PA-C      magnesium citrate  296 mL Oral Daily PRN Olivier Salem, PA-C  melatonin  6 mg Oral HS PRN [facility], PA-C      methylnaltrexone bromide  12 mg Subcutaneous Q48H PRN [facility], PA-C      metoprolol tartrate  12 5 mg Oral Q12H [facility], PA-C      miconazole   Topical BID [facility], PA-C      mupirocin  1 application Nasal M46S [facility], PA-C      nystatin   Topical BID [facility], PA-C      ondansetron  4 mg Intravenous Q6H PRN [facility], PA-C      oxyCODONE  15 mg Oral Q4H PRN [facility], PA-C      pantoprazole  40 mg Oral Daily [facility], PA-C      polyethylene glycol  17 g Oral Daily [facility], PA-C      potassium chloride  20 mEq Oral Daily [facility], PA-C      saccharomyces boulardii  250 mg Oral BID [facility], PA-C      senna  1 tablet Oral BID [facility], PA-C       Continuous Infusions:ketamine, 0 2 mg/kg/hr, Last Rate: 0 2 mg/kg/hr (09/11/20 1718)      PRN Meds:   acetaminophen    bisacodyl    calcium carbonate    dicyclomine    glycopyrrolate    haloperidol lactate    hydrocortisone    HYDROmorphone    LORazepam    magnesium citrate    melatonin    methylnaltrexone bromide    ondansetron    oxyCODONE    Vitals:   Vitals:    09/11/20 2020 09/11/20 2246 09/12/20 0244 09/12/20 0600   BP: 140/83 119/60 152/85    BP Location:  Right arm Right arm    Pulse: (!) 114 93 (!) 118    Resp:  16 15    Temp:  98 1 °F (36 7 °C) 97 8 °F (36 6 °C)    TempSrc:  Oral Oral    SpO2:  99% 99%    Weight:    71 4 kg (157 lb 6 5 oz)   Height:           Telemetry: Sinus Tachycardia; Heart Rate: 111    Respiratory:   SpO2: SpO2: 95 %; Room Air    Intake/Output:   I/O       09/10 0701 - 09/11 0700 09/11 0701 - 09/12 0700    P  O  240     I V  (mL/kg) 3217 2 (40 6) 550 5 (7 7)    NG/GT 0     IV Piggyback 550     Cell Saver 450     Total Intake(mL/kg) 4457 2 (56 2) 550 5 (7 7)    Urine (mL/kg/hr) 2055 (1 1) 1975 (1 2)    Emesis/NG output 0     Blood 450     Chest Tube 275 240    Total Output 2780 2215    Net +1677 2 -1664 5                Chest tube Output:    Mediastinal tubes: 50 mL/8 hours  170 mL/24 hours   Pleural tubes: 45 mL/8 hours  70 mL/24 hours     Weights:   Weight (last 2 days)     Date/Time   Weight    09/12/20 0600   71 4 (157 41)    09/11/20 0600   79 3 (174 83)            Admit weight: 67 9 kg    Results:   Results from last 7 days   Lab Units 09/12/20  1004 09/12/20  0921 09/11/20  0321  09/10/20  1133  09/09/20  0525   WBC Thousand/uL  --  6 08 7 18  --   --   --  5 84   HEMOGLOBIN g/dL 7 5* 6 4* 8 3*   < > 10 8*  --  10 4*   I STAT HEMOGLOBIN   --   --   --   --   --    < >  --    HEMATOCRIT % 24 2* 20 8* 26 0*   < > 34 4*  --  33 7*   HEMATOCRIT, ISTAT   --   --   --   --   --    < >  --    PLATELETS Thousands/uL  --  115* 154  --  269   < > 304    < > = values in this interval not displayed  Results from last 7 days   Lab Units 09/12/20  0605 09/11/20  0321 09/10/20  1838  09/10/20  1133 09/10/20  1131   SODIUM mmol/L 138 136  --   --  142  --    POTASSIUM mmol/L 3 6 4 1 4 6   < > 4 0  --    CHLORIDE mmol/L 104 104  --   --  108  --    CO2 mmol/L 27 26  --   --  26  --    CO2, I-STAT mmol/L  --   --   --   --   --  25   BUN mg/dL 8 13  --   --  14  --    CREATININE mg/dL 0 37* 0 36*  --   --  0 42*  --    GLUCOSE, ISTAT mg/dl  --   --   --   --   --  88   CALCIUM mg/dL 8 3 8 1*  --   --  8 1*  --     < > = values in this interval not displayed       Results from last 7 days   Lab Units 09/10/20  1838   INR  1 06   PTT seconds 33     Point of care glucose: 110-138    Studies:  CXR: 9/11  IMPRESSION:  Status post median sternotomy without significant change      I have personally reviewed pertinent films in PACS    Invasive Lines/Tubes:  Invasive Devices     Peripherally Inserted Central Catheter Line            PICC Line 08/26/20 16 days          Central Venous Catheter Line            CVC Central Lines 09/10/20 Triple 1 day          Line            Pacer Wires 1 day Pacer Wires 1 day          Drain            Chest Tube 1 Right Pleural 32 Fr  1 day    Chest Tube 2 Posterior Mediastinal 32 Fr  1 day    Chest Tube 3 Anterior Mediastinal 32 Fr  1 day                Physical Exam:    HEENT/NECK:  PERRLA  No jugular venous distention  Cardiac: Tachycardic and No murmurs/rubs/gallops  Pulmonary:  Breath sounds diminished in the bases bilaterally  and No rales/rhonchi/wheezes  Abdomen:  Non-tender, Non-distended and Normal bowel sounds  Incisions: Sternum is stable  Incision dressed with Acticoat  No erythema or drainage  Extremities: Extremities warm/dry and No edema B/L  Neuro: Alert and oriented X 3, Sensation is grossly intact and No focal deficits  Skin: Warm/Dry, without rashes or lesions  Assessment:  Principal Problem:    Bacteremia due to Staphylococcus aureus  Active Problems:    Acute bacterial endocarditis    Septic embolism (HCC)    Bipolar 1 disorder (HCC)    Right hip pain    Intravenous drug abuse ([address] )    DVT of axillary vein, acute left (HCC)    Transaminitis    Rash    Anemia    Hyperphosphatemia    S/P TVR (tricuspid valve repair)       Severe Tricuspid Regurgitation, Tricuspid Valve Endocarditis  S/P tricuspid valve repair; POD # 2    Plan:    1  Cardiac:   Sinus Tachycardia; Hypertensive  Increase Lopressor, 25mg PO BID  Continue ASA and Statin therapy  Epicardial pacing wires no longer required  Remove today  PICC present  Continue DVT prophylaxis  Anticoagulation needed for acute left axillary DVT, INR 1 06, dose Coumadin tonight    2  Pulmonary:   Good Room air oxygen saturation; Continue incentive spirometry/Coughing/Deep breathing exercises  Chest tube drainage diminished; D/C today    3  Renal:   Intake/Output net: -1 6 L/24 hours  Start diuresis   Lasix 20 mg IV QD  Potassium Chloride 20 mEq PO QD  Post op Creatinine stable; Follow up labs prn  Hyperphosphatemia resolved, Phos 3 4    4  Neuro:  Neurologically intact;  No active issues  Post-op pain regimen managed by Acute Pain Services  Continue acetaminophen 975 mg p o  q 8 hours scheduled  Continue ketamine infusion at 0 2 mg/kg/HR  Continue oxycodone 15 mg p o  q 4 hours p r n  severe pain  Continue dilaudid 2 mg IV q 2 hours p r n  breakthrough pain  Continue duloxetine 60 mg p o  daily scheduled  Continue lorazepam 1 mg IV q 2 hours p r n  anxiety  Will attempt to wean to q4hrs PRN today  Continue gabapentin 100 mg p o  hs   Plan to discontinue on 9/14/20  Continue lidocaine 5% patch, 2 patches for 12 hours daily  5  GI:  Tolerating TLC 2 3 gm sodium diet  Maintain 1800 mL daily fluid restriction   Continue stool softeners and prn suppository  Continue GI prophylaxis  Chronic HCV infection - monitor LFTs PRN    6  Endo:   No history of diabetes; Glucose well-controlled with sliding scale coverage    7    Hematology:    Post-operative acute blood loss anemia; Hemoglobin 7 5; trend prn and Thrombocytopenia    8  ID:    Recurrent MSSA bacteremia with Tricuspid Valve infective endocarditis    Continue Ancef at least through 9/26 to complete a 6 week course from the first negative blood culture    OR cultures from 9/10 show no growth   Right iliacus muscle abscess/sacroiliitis    Imaging shows abscess is decreasing in size, Inflammatory markers continue to decrease, follow up ESR and CRP per IDs recommendations     8  Disposition:      Active IV heroin abuse - not a candidate for home antibiotic administration via PICC line   Will remain inpatient until IV antibiotic course is complete, likely 9/26    VTE Pharmacologic Prophylaxis: Warfarin (Coumadin)  VTE Mechanical Prophylaxis: sequential compression device    Collaborative rounds completed with SARAH Klein    Plan of care discussed with bedside nurse    SIGNATURE: Hedy Thao PA-C  DATE: September 12, 2020  TIME: 6:42 AM

## 2020-09-12 NOTE — PROCEDURES
09/12/20    Procedure: Chest tube removal    Chest tubes removed in routine fashion without incident  Insertion site dressed with Acticoat  [place] [employer]be[employer] tolerated the procedure well  Nurse notified  SIGNATURE: Queta Smith PA-C  DATE: September 12, 2020  TIME: 12:57 PM      09/12/20    Procedure: Epicardial Pacing Wire removal    Tristen Huerat was returned to bed and informed of mandatory one hour post-procedure bed rest   The assigned nurse was notified  Epicardial pacing wires removed in routine fashion, without incident  The patient tolerated the procedure well  Vital signs ordered  q 15 minutes for one hour, as per protocol      SIGNATURE: Queta Smith PA-C  DATE: September 12, 2020  TIME: 12:57 PM

## 2020-09-12 NOTE — OCCUPATIONAL THERAPY NOTE
Occupational Therapy Re-Evaluation     Patient Name: Jennifer SHARMA Date: 9/12/2020  Problem List  Principal Problem:    Bacteremia due to Staphylococcus aureus  Active Problems:    Acute bacterial endocarditis    Septic embolism ([facility] [address] )    Bipolar 1 disorder ([facility] [address] )    Right hip pain    Intravenous drug abuse ([facility] [address] )    DVT of axillary vein, acute left (HCC)    Transaminitis    Rash    Anemia    Hyperphosphatemia    S/P TVR (tricuspid valve repair)    Past Medical History  Past Medical History:   Diagnosis Date    Abnormal Pap smear of cervix     Anxiety     Depression     Endocarditis     2018    Hepatitis C     HPV (human papilloma virus) anogenital infection      Past Surgical History  Past Surgical History:   Procedure Laterality Date    IR PICC LINE PLACEMENT DOUBLE LUMEN  8/26/2020    KNEE SURGERY Left     MOUTH SURGERY      NV REPLACE TRICUSPID W CP BYPASS N/A 9/10/2020    Procedure: REPAIR VALVE TRICUSPID with Medtronic 30mm 3D Contour  Annuloplasty Ring;  Surgeon: Nazario Gillespie MD;  [facility];  Service: Cardiac Surgery    TOOTH EXTRACTION N/A 9/7/2020    Procedure: EXTRACTION TOOTH 32;  Surgeon: Pratibha Pereyra DMD;  [facility];  Service: Maxillofacial         09/12/20 0940   Note Type   Note type Eval only   Restrictions/Precautions   Weight Bearing Precautions Per Order No   Other Precautions Telemetry;Multiple lines;O2;Pain;Cardiac/sternal 2L02   Pain Assessment   Pain Assessment Tool 0-10   Nelson-Baker FACES Pain Rating 8   Pain Location/Orientation Location: Hip;Location: Chest  (incision)   Hospital Pain Intervention(s) Repositioned; Ambulation/increased activity; Elevated; Rest   Home Living   Type of [address] Two level;1/2 bath on main level;Bed/bath upstairs   Bathroom Shower/Tub Tub/shower unit   Bathroom Toilet Standard   Bathroom Equipment Grab bars in shower   Bathroom Accessibility Accessible   Prior Function   Level of [place] Independent with ADLs and functional mobility   Lives With [facility] Help From Family   ADL Assistance Independent   IADLs Independent   Falls in the last 6 months 0   Vocational Unemployed   Lifestyle   Autonomy I ADL's/shares homemaking tasks with mother, no AD with functional ambulation, +drives, unemployed   Reciprocal Relationships mother, stepfather, brother   Service to Others Pt is unemployed  Intrinsic Gratification Pt reports enjoying spending time w/ her dtr  Psychosocial   Psychosocial (WDL) X   Patient Behaviors/Mood Anxious   ADL   Eating Assistance 5  Supervision/Setup   Grooming Assistance 5  Supervision/Setup   UB Bathing Assistance 5  Supervision/Setup   LB Bathing Assistance 3  Moderate Assistance   UB Dressing Assistance 4  Minimal Assistance   LB Dressing Assistance 3  Moderate Assistance   LB Dressing Deficit Thread RLE into underwear; Thread LLE into underwear  (assistance to thread B/L LE's through underwear able to pull up over knees and hike over hips with Supervision)   Toileting Assistance  4  Minimal Assistance   Transfers   Sit to Stand 5  Supervision   Additional items Assist x 1   Stand to Sit 5  Supervision   Additional items Assist x 1   Functional Mobility   Functional Mobility 4  Minimal assistance   Additional Comments min a x 1 with RW short distance functional ambulatino no 02, Pulse ox 97% post mobility no MATTHEWS noted   Pt anxious requested 02 post mobility, seated in chair +donned 2L02 -RN aware   Additional items Rolling walker   Balance   Static Sitting Fair   Dynamic Sitting Fair   Static Standing Fair -   Dynamic Standing Fair -   Ambulatory Poor +   Activity Tolerance   Activity Tolerance Patient limited by fatigue;Patient limited by pain   Medical Staff Made Aware PT   Nurse Made Aware RN cleared pt for therapy   RUE Assessment   RUE Assessment WFL   LUE Assessment   LUE Assessment WFL   Hand Function   Gross Motor Coordination Functional   Fine Motor Coordination Functional   Sensation   Light Touch No apparent deficits   Cognition   Arousal/Participation Alert; Cooperative   Attention Attends with cues to redirect   Orientation Level Oriented X4   Memory Decreased recall of recent events;Decreased recall of precautions   Following Commands Follows one step commands with increased time or repetition   Assessment   Limitation Decreased ADL status; Decreased endurance;Decreased self-care trans;Decreased high-level ADLs   Prognosis Fair   Assessment Pt seen for a re-evaluation 2* to cardiac surgery a 34 y o  female who was admitted to [address] [place] [address] on 8/12/2020 with Bacteremia due to Staphylococcus aureus, Tricuspid valve endocarditis with severe tricuspid regurgitation, History of injection drug abuse  S/p Complex tricuspid valve repair with leaflet resection and repair and 30 mm Medtronic Contour 3D partial ring annuloplasty on 9/10/20   Pt's problem list also includes PMH of insomnia, chronic hepatitis C, loculated pleural effusion, acute pyelonephritis, 40 weeks gestation pregnancy  At base line pt was completing I ADL's shares homemaking with mother, +drives, unemployed  Pt lives with mother, stepfather, brother and 1year old daughter  Currently pt requires min a UB, mod a LB for overall ADLS and min a x 1 with RW for functional mobility/transfers  Pt currently presents with impairments in the following categories -steps to enter environment, difficulty performing ADLS, difficulty performing IADLS , compliance and health management  activity tolerance, endurance and standing balance/tolerance   These impairments, as well as pt's fatigue, pain and risk for falls  limit pt's ability to safely engage in all baseline areas of occupation, includingbathing, dressing, toileting, functional mobility/transfers, community mobility, laundry , driving, house maintenance, medication management, meal prep, cleaning and care of children From OT standpoint, recommend home with family support upon D/C  OT will continue to follow to address the below stated goals  Goals   Patient Goals less pain   LTG Time Frame 10-14   Long Term Goal #1 see goals below   Plan   Treatment Interventions ADL retraining;Functional transfer training;UE strengthening/ROM; Endurance training;Cognitive reorientation;Patient/family training;Equipment evaluation/education; Compensatory technique education; Energy conservation; Activityengagement   Goal Expiration Date 09/26/20   OT Frequency 3-5x/wk   Recommendation   OT Discharge Recommendation Home with skilled therapy-Home OT   OT - OK to Discharge No   Barthel Index   Feeding 5   Bathing 0   Grooming Score 0   Dressing Score 5   Bladder Score 0   Bowels Score 5   Toilet Use Score 5   Transfers (Bed/Chair) Score 10   Mobility (Level Surface) Score 0   Stairs Score 0   Barthel Index Score 30   Modified Booker Scale   Modified Porter Scale 4       1) Pt will improve activity tolerance to G for min 30 min txment sessions to increase participation in ADLs  2) Pt will complete ADLs/self care w/ mod I using adaptive device and DME as needed  3) Pt will complete toileting w/ mod I w/ G hygiene/thoroughness using DME as needed  4) Pt will improve functional transfers on/off all surfaces using DME as needed w/ G balance/safety including w/ mod I  5) Pt will improve functional mobility during ADL/IADL/leisure tasks using DME as needed w/ G balance/safety w/ mod I  6) Pt will engage in ongoing cognitive assessment w/ G participation w/ mod I to assist w/ safe d/c planning/recommendations  7) Pt will demonstrate G carryover of pt/caregiver education and training as appropriate w/ mod I w/o cues w/ good tolerance  8) Pt will demonstrate 100% carryover of energy conservation techniques w/ mod I t/o functional I/ADL/leisure tasks w/o cues s/p skilled education  9) Pt will engage in cardiac education using the Recovering After Cardiac Rehabilitation packet w/ G participation and G carryover  10) Pt will demonstrate 100% carryover of precautions s/p review w/o cues w/ mod I w/ G tolerance/participation t/o functional ADL/IADL/leisure tasks    OT Assessment  Pt seen for an additional OT treatment session with focus on LB self care tasks,  standing tolerance/balance, functional mobility, activity tolerance  Pt standing for apprx~1-2 minutes 2x with CS, min a/CG with functional ambulation  Pt limited by fatigue, from OT standpoint OT recommend home with family support  Pt continues to function below baseline levels occupational performance remains limited with the above noted deficits  Continue plan of care to  Maximize functional I with all ADL's/mobility tasks      Martha Christensen MOT, OTR/L

## 2020-09-12 NOTE — RESTORATIVE TECHNICIAN NOTE
Restorative Specialist Mobility Note       Activity: Ambulate in coffman, Chair     Assistive Device: Front wheel walker

## 2020-09-12 NOTE — PLAN OF CARE
Problem: PHYSICAL THERAPY ADULT  Goal: Performs mobility at highest level of function for planned discharge setting  See evaluation for individualized goals  Description: Treatment/Interventions: Functional transfer training, LE strengthening/ROM, Elevations, Therapeutic exercise, Endurance training, Equipment eval/education, Bed mobility, Gait training, Spoke to nursing, Spoke to case management, Spoke to advanced practitioner, OT(will defer from (R) hip therex at this time)  Equipment Recommended: Walker(at this time; will cont to monitor progress)       See flowsheet documentation for full assessment, interventions and recommendations  Outcome: Progressing  Note: Prognosis: Good  Problem List: Decreased strength, Decreased endurance, Impaired balance, Decreased mobility, Pain  Assessment: Pt presents to therapy today with reduced mobility, pain, risk of falling, gait abnormalities, poor activity tolerance  These impairments limit the patient by requiring assistance for mobility and places her at risk of falling  Pt would benefit from continued skilled therapy while in the hospital to improve overall mobility and work towards a safe d/c  Recommend home  At end of session patient was left seated with call bell within reach  Chair alarm on  Pt tolerated ambulation short distances with rest break post  PT encouraged to ambulate 4x a day  Barriers to Discharge: Inaccessible home environment     PT Discharge Recommendation: Return to previous environment with social support     PT - OK to Discharge: No    See flowsheet documentation for full assessment

## 2020-09-12 NOTE — PLAN OF CARE
Problem: OCCUPATIONAL THERAPY ADULT  Goal: Performs self-care activities at highest level of function for planned discharge setting  See evaluation for individualized goals  Description:self care retraining  Note: Limitation: Decreased ADL status, Decreased endurance, Decreased self-care trans, Decreased high-level ADLs  Prognosis: Fair  Assessment: Pt is a 34 y o  female who was admitted to [facility] on 8/12/2020 with Bacteremia due to Staphylococcus aureus, Tricuspid valve endocarditis with severe tricuspid regurgitation, History of injection drug abuse  S/p Complex tricuspid valve repair with leaflet resection and repair and 30 mm Medtronic Contour 3D partial ring annuloplasty    Pt's problem list also includes PMH of insomnia, chronic hepatitis C, loculated pleural effusion, acute pyelonephritis, 40 weeks gestation pregnancy  At baseline pt was completing I ADL's shares homemaking with mother, +drives, unemployed  Pt lives with mother, stepfather, brother and 1year old daughter  Currently pt requires min a UB, mod a LB for overall ADLS and min a x 1 with RW for functional mobility/transfers  Pt currently presents with impairments in the following categories -steps to enter environment, difficulty performing ADLS, difficulty performing IADLS , compliance and health management  activity tolerance, endurance and standing balance/tolerance  These impairments, as well as pt's fatigue, pain and risk for falls  limit pt's ability to safely engage in all baseline areas of occupation, includingbathing, dressing, toileting, functional mobility/transfers, community mobility, laundry , driving, house maintenance, medication management, meal prep, cleaning and care of children From OT standpoint, recommend home with family support upon D/C  OT will continue to follow to address the below stated goals        OT Discharge Recommendation: Home with skilled therapy  OT - OK to Discharge: No

## 2020-09-12 NOTE — PHYSICAL THERAPY NOTE
Physical Therapy Treatment Note     Patient Name: Margaret Severino    LCCIV'F Date: 9/12/2020     Problem List  Principal Problem:    Bacteremia due to Staphylococcus aureus  Active Problems:    Acute bacterial endocarditis    Septic embolism ([facility] [address] )    Bipolar 1 disorder ([facility] [address] )    Right hip pain    Intravenous drug abuse ([facility] [address] )    DVT of axillary vein, acute left (HCC)    Transaminitis    Rash    Anemia    Hyperphosphatemia    S/P TVR (tricuspid valve repair)       Past Medical History  Past Medical History:   Diagnosis Date    Abnormal Pap smear of cervix     Anxiety     Depression     Endocarditis     2018    Hepatitis C     HPV (human papilloma virus) anogenital infection         Past Surgical History  Past Surgical History:   Procedure Laterality Date    IR PICC LINE PLACEMENT DOUBLE LUMEN  8/26/2020    KNEE SURGERY Left     MOUTH SURGERY      [place] REPLACE TRICUSPID W CP BYPASS N/A 9/10/2020    Procedure: REPAIR VALVE TRICUSPID with Medtronic 30mm 3D Contour  Annuloplasty Ring;  Surgeon: Indu More MD;  [facility];  Service: Cardiac Surgery    TOOTH EXTRACTION N/A 9/7/2020    Procedure: EXTRACTION TOOTH 32;  Surgeon: Eduardo Pham DMD;  [facility];  Service: Maxillofacial        09/12/20 0943   PT Last Visit   PT Visit Date 09/12/20   Pain Assessment   Pain Assessment Tool Nelson-Baker FACES   Nelson-Baker FACES Pain Rating 8   Pain Location/Orientation Location: Hip; Other (Comment)  (chest)   Restrictions/Precautions   Weight Bearing Precautions Per Order No   Other Precautions Cardiac/sternal;Multiple lines;Telemetry; Fall Risk;Pain;O2   General   Chart Reviewed Yes   Family/Caregiver Present No   Cognition   Overall Cognitive Status WFL   Arousal/Participation Alert; Cooperative   Attention Attends with cues to redirect   Orientation Level Oriented X4   Memory Within functional limits   Following Commands Follows all commands and directions without difficulty   Bed Mobility   Additional Comments pt OOB at the begining of the sesion   Transfers   Sit to Stand 5  Supervision   Stand to Sit 5  Supervision   Ambulation/Elevation   Gait pattern Excessively slow; Shuffling; Antalgic;Narrow RICHARD; Forward Flexion   Gait Assistance 5  Supervision   Assistive Device Rolling walker   Distance [id number]   Balance   Static Sitting Fair   Dynamic Sitting Fair   Static Standing Fair -   Dynamic Standing Fair -   Ambulatory Fair -   Endurance Deficit   Endurance Deficit Yes   Activity Tolerance   Activity Tolerance Patient limited by fatigue;Patient limited by pain   Nurse Made Aware nurse approved therapy session   Medical Staff Made Aware OT   Exercises   Balance training  standing 2-3 minutes x 2 trials    Assessment   Prognosis Good   Problem List Decreased strength;Decreased endurance; Impaired balance;Decreased mobility;Pain   Assessment Pt presents to therapy today with reduced mobility, pain, risk of falling, gait abnormalities, poor activity tolerance  These impairments limit the patient by requiring assistance for mobility and places her at risk of falling  Pt would benefit from continued skilled therapy while in the hospital to improve overall mobility and work towards a safe d/c  Recommend home  At end of session patient was left seated with call bell within reach  Chair alarm on  Pt tolerated ambulation short distances with rest break post  PT encouraged to ambulate 4x a day  Barriers to Discharge Inaccessible home environment   Goals   STG Expiration Date 09/25/20   PT Treatment Day 1   Plan   Treatment/Interventions Functional transfer training;LE strengthening/ROM; Therapeutic exercise; Endurance training;Gait training;Bed mobility   Progress Progressing toward goals   PT Frequency Other (Comment)  (3-6xwk)   Recommendation   PT Discharge Recommendation Return to previous environment with social support   PT - OK to Discharge No Mathieu Masterson, Pt, DPT

## 2020-09-13 LAB
ANION GAP SERPL CALCULATED.3IONS-SCNC: 7 MMOL/L (ref 4–13)
BACTERIA SPEC ANAEROBE CULT: NO GROWTH
BACTERIA TISS AEROBE CULT: NO GROWTH
BUN SERPL-MCNC: 7 MG/DL (ref 5–25)
CALCIUM SERPL-MCNC: 8.2 MG/DL (ref 8.3–10.1)
CHLORIDE SERPL-SCNC: 105 MMOL/L (ref 100–108)
CO2 SERPL-SCNC: 28 MMOL/L (ref 21–32)
CREAT SERPL-MCNC: 0.33 MG/DL (ref 0.6–1.3)
ERYTHROCYTE [DISTWIDTH] IN BLOOD BY AUTOMATED COUNT: 14.8 % (ref 11.6–15.1)
GFR SERPL CREATININE-BSD FRML MDRD: 153 ML/MIN/1.73SQ M
GLUCOSE SERPL-MCNC: 100 MG/DL (ref 65–140)
GLUCOSE SERPL-MCNC: 100 MG/DL (ref 65–140)
GLUCOSE SERPL-MCNC: 102 MG/DL (ref 65–140)
GLUCOSE SERPL-MCNC: 109 MG/DL (ref 65–140)
GLUCOSE SERPL-MCNC: 90 MG/DL (ref 65–140)
GRAM STN SPEC: NORMAL
GRAM STN SPEC: NORMAL
HCT VFR BLD AUTO: 24.7 % (ref 34.8–46.1)
HCV RNA SERPL NAA+PROBE-ACNC: NORMAL IU/ML
HCV RNA SERPL NAA+PROBE-ACNC: NORMAL IU/ML
HCV RNA SERPL NAA+PROBE-LOG IU: 7.25 LOG10 IU/ML
HGB BLD-MCNC: 7.4 G/DL (ref 11.5–15.4)
INR PPP: 1.16 (ref 0.84–1.19)
MCH RBC QN AUTO: 27.1 PG (ref 26.8–34.3)
MCHC RBC AUTO-ENTMCNC: 30 G/DL (ref 31.4–37.4)
MCV RBC AUTO: 91 FL (ref 82–98)
PLATELET # BLD AUTO: 170 THOUSANDS/UL (ref 149–390)
PMV BLD AUTO: 9 FL (ref 8.9–12.7)
POTASSIUM SERPL-SCNC: 3.5 MMOL/L (ref 3.5–5.3)
PROTHROMBIN TIME: 14.9 SECONDS (ref 11.6–14.5)
RBC # BLD AUTO: 2.73 MILLION/UL (ref 3.81–5.12)
SODIUM SERPL-SCNC: 140 MMOL/L (ref 136–145)
TEST INFORMATION: NORMAL
WBC # BLD AUTO: 6.1 THOUSAND/UL (ref 4.31–10.16)

## 2020-09-13 PROCEDURE — 99024 POSTOP FOLLOW-UP VISIT: CPT | Performed by: PHYSICIAN ASSISTANT

## 2020-09-13 PROCEDURE — 85027 COMPLETE CBC AUTOMATED: CPT | Performed by: PHYSICIAN ASSISTANT

## 2020-09-13 PROCEDURE — 80048 BASIC METABOLIC PNL TOTAL CA: CPT | Performed by: PHYSICIAN ASSISTANT

## 2020-09-13 PROCEDURE — 85610 PROTHROMBIN TIME: CPT | Performed by: PHYSICIAN ASSISTANT

## 2020-09-13 PROCEDURE — 82948 REAGENT STRIP/BLOOD GLUCOSE: CPT

## 2020-09-13 RX ORDER — ASCORBIC ACID 500 MG
500 TABLET ORAL DAILY
Status: DISCONTINUED | OUTPATIENT
Start: 2020-09-13 | End: 2020-09-28 | Stop reason: HOSPADM

## 2020-09-13 RX ORDER — WARFARIN SODIUM 5 MG/1
5 TABLET ORAL
Status: COMPLETED | OUTPATIENT
Start: 2020-09-13 | End: 2020-09-13

## 2020-09-13 RX ORDER — ASCORBIC ACID 500 MG
500 TABLET ORAL DAILY
Status: DISCONTINUED | OUTPATIENT
Start: 2020-09-13 | End: 2020-09-13 | Stop reason: SDUPTHER

## 2020-09-13 RX ORDER — FERROUS SULFATE 325(65) MG
325 TABLET ORAL
Status: DISCONTINUED | OUTPATIENT
Start: 2020-09-14 | End: 2020-09-13 | Stop reason: SDUPTHER

## 2020-09-13 RX ORDER — FERROUS SULFATE 325(65) MG
325 TABLET ORAL
Status: DISCONTINUED | OUTPATIENT
Start: 2020-09-13 | End: 2020-09-28 | Stop reason: HOSPADM

## 2020-09-13 RX ORDER — POTASSIUM CHLORIDE 29.8 MG/ML
40 INJECTION INTRAVENOUS ONCE
Status: COMPLETED | OUTPATIENT
Start: 2020-09-13 | End: 2020-09-13

## 2020-09-13 RX ORDER — METHOCARBAMOL 750 MG/1
750 TABLET, FILM COATED ORAL EVERY 6 HOURS SCHEDULED
Status: DISCONTINUED | OUTPATIENT
Start: 2020-09-13 | End: 2020-09-19

## 2020-09-13 RX ADMIN — CEFAZOLIN SODIUM 2000 MG: 2 SOLUTION INTRAVENOUS at 03:46

## 2020-09-13 RX ADMIN — OXYCODONE HYDROCHLORIDE 15 MG: 10 TABLET ORAL at 00:24

## 2020-09-13 RX ADMIN — HYDROMORPHONE HYDROCHLORIDE 2 MG: 1 INJECTION, SOLUTION INTRAMUSCULAR; INTRAVENOUS; SUBCUTANEOUS at 17:59

## 2020-09-13 RX ADMIN — OXYCODONE HYDROCHLORIDE 15 MG: 10 TABLET ORAL at 21:58

## 2020-09-13 RX ADMIN — POTASSIUM CHLORIDE 10 MEQ: 750 TABLET, EXTENDED RELEASE ORAL at 08:50

## 2020-09-13 RX ADMIN — OLANZAPINE 5 MG: 5 TABLET, FILM COATED ORAL at 21:57

## 2020-09-13 RX ADMIN — AMIODARONE HYDROCHLORIDE 200 MG: 200 TABLET ORAL at 05:12

## 2020-09-13 RX ADMIN — POTASSIUM CHLORIDE 40 MEQ: 29.8 INJECTION, SOLUTION INTRAVENOUS at 08:51

## 2020-09-13 RX ADMIN — DULOXETINE HYDROCHLORIDE 60 MG: 60 CAPSULE, DELAYED RELEASE ORAL at 08:50

## 2020-09-13 RX ADMIN — ACETAMINOPHEN 975 MG: 325 TABLET, FILM COATED ORAL at 19:31

## 2020-09-13 RX ADMIN — LORAZEPAM 1 MG: 2 INJECTION INTRAMUSCULAR; INTRAVENOUS at 03:45

## 2020-09-13 RX ADMIN — HYDROMORPHONE HYDROCHLORIDE 2 MG: 1 INJECTION, SOLUTION INTRAMUSCULAR; INTRAVENOUS; SUBCUTANEOUS at 08:50

## 2020-09-13 RX ADMIN — HYDROMORPHONE HYDROCHLORIDE 2 MG: 1 INJECTION, SOLUTION INTRAMUSCULAR; INTRAVENOUS; SUBCUTANEOUS at 03:46

## 2020-09-13 RX ADMIN — FERROUS SULFATE TAB 325 MG (65 MG ELEMENTAL FE) 325 MG: 325 (65 FE) TAB at 08:50

## 2020-09-13 RX ADMIN — FONDAPARINUX SODIUM 2.5 MG: 2.5 INJECTION, SOLUTION SUBCUTANEOUS at 11:14

## 2020-09-13 RX ADMIN — FUROSEMIDE 20 MG: 10 INJECTION, SOLUTION INTRAMUSCULAR; INTRAVENOUS at 08:50

## 2020-09-13 RX ADMIN — DOCUSATE SODIUM 100 MG: 100 CAPSULE, LIQUID FILLED ORAL at 17:02

## 2020-09-13 RX ADMIN — METHOCARBAMOL TABLETS 750 MG: 750 TABLET, COATED ORAL at 23:04

## 2020-09-13 RX ADMIN — MIRTAZAPINE 30 MG: 30 TABLET, FILM COATED ORAL at 21:58

## 2020-09-13 RX ADMIN — ASPIRIN 81 MG CHEWABLE TABLET 81 MG: 81 TABLET CHEWABLE at 08:50

## 2020-09-13 RX ADMIN — OXYCODONE HYDROCHLORIDE AND ACETAMINOPHEN 500 MG: 500 TABLET ORAL at 08:51

## 2020-09-13 RX ADMIN — SENNOSIDES 8.6 MG: 8.6 TABLET ORAL at 08:50

## 2020-09-13 RX ADMIN — OXYCODONE HYDROCHLORIDE 15 MG: 10 TABLET ORAL at 16:54

## 2020-09-13 RX ADMIN — METOPROLOL TARTRATE 25 MG: 25 TABLET, FILM COATED ORAL at 08:50

## 2020-09-13 RX ADMIN — METOPROLOL TARTRATE 25 MG: 25 TABLET, FILM COATED ORAL at 21:58

## 2020-09-13 RX ADMIN — WARFARIN SODIUM 5 MG: 5 TABLET ORAL at 17:59

## 2020-09-13 RX ADMIN — MAGNESIUM OXIDE TAB 400 MG (241.3 MG ELEMENTAL MG) 400 MG: 400 (241.3 MG) TAB at 11:14

## 2020-09-13 RX ADMIN — MAGNESIUM OXIDE TAB 400 MG (241.3 MG ELEMENTAL MG) 400 MG: 400 (241.3 MG) TAB at 17:02

## 2020-09-13 RX ADMIN — HYDROMORPHONE HYDROCHLORIDE 2 MG: 1 INJECTION, SOLUTION INTRAMUSCULAR; INTRAVENOUS; SUBCUTANEOUS at 06:25

## 2020-09-13 RX ADMIN — HYDROMORPHONE HYDROCHLORIDE 2 MG: 1 INJECTION, SOLUTION INTRAMUSCULAR; INTRAVENOUS; SUBCUTANEOUS at 00:54

## 2020-09-13 RX ADMIN — Medication 250 MG: at 16:53

## 2020-09-13 RX ADMIN — HYDROMORPHONE HYDROCHLORIDE 2 MG: 1 INJECTION, SOLUTION INTRAMUSCULAR; INTRAVENOUS; SUBCUTANEOUS at 14:23

## 2020-09-13 RX ADMIN — PANTOPRAZOLE SODIUM 40 MG: 40 TABLET, DELAYED RELEASE ORAL at 08:51

## 2020-09-13 RX ADMIN — Medication 250 MG: at 08:50

## 2020-09-13 RX ADMIN — LORAZEPAM 1 MG: 2 INJECTION INTRAMUSCULAR; INTRAVENOUS at 10:02

## 2020-09-13 RX ADMIN — AMIODARONE HYDROCHLORIDE 200 MG: 200 TABLET ORAL at 14:24

## 2020-09-13 RX ADMIN — OXYCODONE HYDROCHLORIDE 15 MG: 10 TABLET ORAL at 10:02

## 2020-09-13 RX ADMIN — LORAZEPAM 1 MG: 2 INJECTION INTRAMUSCULAR; INTRAVENOUS at 06:25

## 2020-09-13 RX ADMIN — CALCIUM ACETATE 667 MG: 667 CAPSULE ORAL at 08:50

## 2020-09-13 RX ADMIN — METHOCARBAMOL TABLETS 750 MG: 750 TABLET, COATED ORAL at 16:14

## 2020-09-13 RX ADMIN — MELATONIN 6 MG: at 21:58

## 2020-09-13 RX ADMIN — MUPIROCIN 1 APPLICATION: 20 OINTMENT TOPICAL at 10:01

## 2020-09-13 RX ADMIN — HYDROMORPHONE HYDROCHLORIDE 2 MG: 1 INJECTION, SOLUTION INTRAMUSCULAR; INTRAVENOUS; SUBCUTANEOUS at 22:59

## 2020-09-13 RX ADMIN — MUPIROCIN 1 APPLICATION: 20 OINTMENT TOPICAL at 21:57

## 2020-09-13 RX ADMIN — HYDROMORPHONE HYDROCHLORIDE 2 MG: 1 INJECTION, SOLUTION INTRAMUSCULAR; INTRAVENOUS; SUBCUTANEOUS at 11:15

## 2020-09-13 RX ADMIN — GABAPENTIN 100 MG: 100 CAPSULE ORAL at 21:57

## 2020-09-13 RX ADMIN — ACETAMINOPHEN 975 MG: 325 TABLET, FILM COATED ORAL at 11:14

## 2020-09-13 RX ADMIN — DOCUSATE SODIUM 100 MG: 100 CAPSULE, LIQUID FILLED ORAL at 08:50

## 2020-09-13 RX ADMIN — SENNOSIDES 8.6 MG: 8.6 TABLET ORAL at 17:02

## 2020-09-13 RX ADMIN — LORAZEPAM 1 MG: 2 INJECTION INTRAMUSCULAR; INTRAVENOUS at 21:59

## 2020-09-13 RX ADMIN — HYDROMORPHONE HYDROCHLORIDE 2 MG: 1 INJECTION, SOLUTION INTRAMUSCULAR; INTRAVENOUS; SUBCUTANEOUS at 20:38

## 2020-09-13 RX ADMIN — LORAZEPAM 1 MG: 2 INJECTION INTRAMUSCULAR; INTRAVENOUS at 18:40

## 2020-09-13 RX ADMIN — AMIODARONE HYDROCHLORIDE 200 MG: 200 TABLET ORAL at 21:57

## 2020-09-13 RX ADMIN — Medication 0.2 MG/KG/HR: at 10:02

## 2020-09-13 RX ADMIN — POLYETHYLENE GLYCOL 3350 17 G: 17 POWDER, FOR SOLUTION ORAL at 08:51

## 2020-09-13 RX ADMIN — CALCIUM ACETATE 667 MG: 667 CAPSULE ORAL at 16:14

## 2020-09-13 RX ADMIN — CALCIUM ACETATE 667 MG: 667 CAPSULE ORAL at 11:14

## 2020-09-13 RX ADMIN — LORAZEPAM 1 MG: 2 INJECTION INTRAMUSCULAR; INTRAVENOUS at 12:11

## 2020-09-13 RX ADMIN — OXYCODONE HYDROCHLORIDE 15 MG: 10 TABLET ORAL at 04:52

## 2020-09-13 RX ADMIN — LORAZEPAM 1 MG: 2 INJECTION INTRAMUSCULAR; INTRAVENOUS at 00:54

## 2020-09-13 RX ADMIN — ACETAMINOPHEN 975 MG: 325 TABLET, FILM COATED ORAL at 03:45

## 2020-09-13 RX ADMIN — LIDOCAINE 5% 2 PATCH: 700 PATCH TOPICAL at 06:28

## 2020-09-13 NOTE — PROGRESS NOTES
Progress Note - Cardiothoracic Surgery   Lyudmila Taylor 34 y o  female MRN: [id number]  Unit/Bed#: [facility], [place] 421-01 Encounter: [id number]    Severe Tricuspid Regurgitation, Tricuspid Valve Endocarditis  S/P tricuspid valve repair; POD # 3      24 Hour Events: Chest tubes and pacing wires discontinued  Pain controlled on current regimen, acute pain services following  Ambulating in hallway        Medications:   Scheduled Meds:  Current Facility-Administered Medications   Medication Dose Route Frequency Provider Last Rate    acetaminophen  650 mg Oral Q6H PRN Paolo Juarez, PA-NALLELY      acetaminophen  975 mg Oral Q8H Paolo Juarez, PA-NALLELY      amiodarone  200 mg Oral Q8H [address] Paolo Juarez, MILLIE      aspirin  81 mg Oral Daily Paolo Juarez, MILLIE      bisacodyl  10 mg Rectal Daily PRN Paolo Juarez, MILLIE      calcium acetate  667 mg Oral TID With Meals Paolo Juarez PA-C      calcium carbonate  1,000 mg Oral Daily PRN Paolo Juarez, MILLIE      dicyclomine  10 mg Oral TID PRN Paolo Juarez PA-C      docusate sodium  100 mg Oral BID Paolo Juarez, MILLIE      DULoxetine  60 mg Oral Daily Paolo Juarez, MILLIE      fondaparinux  2 5 mg Subcutaneous Q24H Amara Browning PA-C      furosemide  20 mg Intravenous Daily Tamy Carolee, MILLIE      gabapentin  100 mg Oral HS Paolo Juarez, PA-NALLELY      glycopyrrolate  0 2 mg Intravenous Q4H PRN Paolo Juarez, PA-NALLELY      haloperidol lactate  2 mg Intramuscular Q30 Min PRN Paolo Juarez, MILLIE      hydrocortisone   Topical 4x Daily PRN Paolo Juarez, MILLIE      HYDROmorphone  2 mg Intravenous Q2H PRN Tamy Carolee, PA-NALLELY      insulin lispro  1-5 Units Subcutaneous TID AC Deisy Judd PA-C      iohexol  50 mL Oral 90 min pre-procedure Paolo Juarez PA-C      ketamine  0 2 mg/kg/hr Intravenous Continuous Paolo Juarez PA-C 0 2 mg/kg/hr (09/12/20 1146)    lidocaine  2 patch Topical Daily Deisy Judd PA-C      LORazepam  1 mg Intravenous Q2H PRN [facility], PA-NALLELY      magnesium citrate  296 mL Oral Daily PRN [facility], PA-C      melatonin  6 mg Oral HS PRN [facility], PA-NALLELY      melatonin  6 mg Oral HS [facility], PA-NALLELY      methylnaltrexone bromide  12 mg Subcutaneous Q48H PRN [facility], PA-C      metoprolol tartrate  25 mg Oral Q12H [address] Amara Browning, MILLIE      miconazole   Topical BID [facility], PA-NALLELY      mirtazapine  30 mg Oral HS [facility], PAINES      mupirocin  1 application Nasal X91C [address] [facility], PAINES      nystatin   Topical BID [facility], PAINES      OLANZapine  5 mg Oral HS [facility] KESHAV Browning, MILLIE      ondansetron  4 mg Intravenous Q6H PRN [facility], PA-NALLELY      oxyCODONE  15 mg Oral Q4H PRN [facility], MILLIE      pantoprazole  40 mg Oral Daily [facility], MILLIE      polyethylene glycol  17 g Oral Daily [facility], PA-NALLELY      potassium chloride  10 mEq Oral Daily [place] Browning, MILLIE      saccharomyces boulardii  250 mg Oral BID [facility], MILLIE      senna  1 tablet Oral BID [facility], PAINES       Continuous Infusions:ketamine, 0 2 mg/kg/hr, Last Rate: 0 2 mg/kg/hr (09/12/20 1146)      PRN Meds:   acetaminophen    bisacodyl    calcium carbonate    dicyclomine    glycopyrrolate    haloperidol lactate    hydrocortisone    HYDROmorphone    LORazepam    magnesium citrate    melatonin    methylnaltrexone bromide    ondansetron    oxyCODONE    Vitals: SBP 100s, HR 90-120s  Vitals:    09/12/20 1541 09/12/20 1922 09/12/20 2319 09/13/20 0243   BP: 108/55 106/57 103/55 107/57   BP Location: Right arm Right arm Right arm Right arm   Pulse: 104 (!) 125 91 92   Resp: 18 18 20 20   Temp: 98 1 °F (36 7 °C) 98 2 °F (36 8 °C)     TempSrc: Oral Oral     SpO2: 93% 95% 95% 96%   Weight:       Height:           Telemetry: Sinus Tachycardia; Heart Rate: 106    Respiratory:   SpO2: SpO2: 96 %;  Room Air    Intake/Output:   I/O       09/10 0123 - 09/11 0700 09/11 0701 - 09/12 0700    P  O  240     I V  (mL/kg) 3217 2 (40 6) 550 5 (7 7)    NG/GT 0     IV Piggyback 550     Cell Saver 450     Total Intake(mL/kg) 4457 2 (56 2) 550 5 (7 7)    Urine (mL/kg/hr) 2055 (1 1) 1975 (1 2)    Emesis/NG output 0     Blood 450     Chest Tube 275 240    Total Output 2780 2215    Net +1677 2 -1664 5            24 hr Net -765cc      Weights:   Weight (last 2 days)     Date/Time   Weight    09/12/20 0600   71 4 (157 41)    09/11/20 0600   79 3 (174 83)            Admit weight: 67 9 kg    Results:   Results from last 7 days   Lab Units 09/13/20  0417 09/12/20  1004 09/12/20  0921 09/11/20  0321   WBC Thousand/uL 6 10  --  6 08 7 18   HEMOGLOBIN g/dL 7 4* 7 5* 6 4* 8 3*   HEMATOCRIT % 24 7* 24 2* 20 8* 26 0*   PLATELETS Thousands/uL 170  --  115* 154     Results from last 7 days   Lab Units 09/13/20  0417 09/12/20  0605 09/11/20  0321  09/10/20  1131   SODIUM mmol/L 140 138 136   < >  --    POTASSIUM mmol/L 3 5 3 6 4 1   < >  --    CHLORIDE mmol/L 105 104 104   < >  --    CO2 mmol/L 28 27 26   < >  --    CO2, I-STAT mmol/L  --   --   --   --  25   BUN mg/dL 7 8 13   < >  --    CREATININE mg/dL 0 33* 0 37* 0 36*   < >  --    GLUCOSE, ISTAT mg/dl  --   --   --   --  88   CALCIUM mg/dL 8 2* 8 3 8 1*   < >  --     < > = values in this interval not displayed  Results from last 7 days   Lab Units 09/13/20  0417 09/10/20  1838   INR  1 16 1 06   PTT seconds  --  33     9/12 Coumadin 5 mg    Point of care glucose: 110-138    Studies:  No new studies      Invasive Lines/Tubes:  Invasive Devices     Peripherally Inserted Central Catheter Line            PICC Line 08/26/20 17 days          Central Venous Catheter Line            CVC Central Lines 09/10/20 Triple 2 days                Physical Exam:    HEENT/NECK:  PERRLA  No jugular venous distention      Cardiac: Tachycardic  Pulmonary:  Breath sounds diminished in the bases bilaterally  and No rales/rhonchi/wheezes  Abdomen: Non-tender, Non-distended and Normal bowel sounds  Incisions: Sternum is stable  Incision dressed with Acticoat  No erythema or drainage  Extremities: Extremities warm/dry and Trace edema B/L  Neuro: Alert and oriented X 3, Sensation is grossly intact and No focal deficits  Skin: Warm/Dry, without rashes or lesions  Assessment:  Principal Problem:    Bacteremia due to Staphylococcus aureus  Active Problems:    Acute bacterial endocarditis    Septic embolism (HCC)    Bipolar 1 disorder (HCC)    Right hip pain    Intravenous drug abuse ([address] )    DVT of axillary vein, acute left (HCC)    Transaminitis    Rash    Anemia    Hyperphosphatemia    S/P TVR (tricuspid valve repair)       Severe Tricuspid Regurgitation, Tricuspid Valve Endocarditis  S/P tricuspid valve repair; POD # 3    Plan:    1  Cardiac:   Sinus Tachycardia; BP well-controlled  Increase Lopressor, 25mg PO BID  Continue ASA and Statin therapy  Epicardial pacing wires out  PICC present  Discontinue central line  Continue DVT prophylaxis  Anticoagulation needed for acute left axillary DVT, INR 1 16, dose Coumadin tonight    2  Pulmonary:   Good Room air oxygen saturation; Continue incentive spirometry/Coughing/Deep breathing exercises  Chest tubes have been discontinued    3  Renal:   Intake/Output net: -765 mL/24 hours  Continue diuresis   Lasix 20 mg IV QD  Potassium Chloride 20 mEq PO QD  Replete K of 3 5  Replete Mag - Mag Ox   Post op Creatinine stable; Follow up labs prn  Hyperphosphatemia resolved, Phos 3 4 on PhosLo 1 tablet TID, monitor phosphate level  Urinary retention - reinsert yuen    4  Neuro:  Neurologically intact; No active issues  Post-op pain regimen managed by Acute Pain Services  Continue acetaminophen 975 mg p o  q 8 hours scheduled  Continue ketamine infusion at 0 2 mg/kg/HR  Continue oxycodone 15 mg p o  q 4 hours p r n  severe pain  Decrease to dilaudid 2 mg IV q 3 hours p r n  breakthrough pain    Continue duloxetine 60 mg p o  daily scheduled  Decrease to lorazepam 1 mg IV q 4 hours p r n  anxiety  Continue gabapentin 100 mg p o  hs   Plan to discontinue on 9/14/20  Continue lidocaine 5% patch, 2 patches for 12 hours daily  5  GI:  Tolerating TLC 2 3 gm sodium diet  Maintain 1800 mL daily fluid restriction   Continue stool softeners and prn suppository  Continue GI prophylaxis  Chronic HCV infection - monitor LFTs     6  Endo:   No history of diabetes; Glucose well-controlled with sliding scale coverage    7    Hematology:    Post-operative acute blood loss anemia; Hemoglobin 7 4; trend prn    Start Iron and Vitamin C    8  ID:    Recurrent MSSA bacteremia with Tricuspid Valve infective endocarditis    Continue Ancef at least through 9/26 to complete a 6 week course from the first negative blood culture    OR cultures from 9/10 show no growth   Right iliacus muscle abscess/sacroiliitis    Imaging shows abscess is decreasing in size, Inflammatory markers continue to decrease, follow up ESR and CRP per IDs recommendations     8  Disposition:      Active IV heroin abuse - not a candidate for home antibiotic administration via PICC line   Will remain inpatient until IV antibiotic course is complete, likely 9/26    VTE Pharmacologic Prophylaxis: Warfarin (Coumadin)  VTE Mechanical Prophylaxis: sequential compression device    Collaborative rounds completed with SARAH Yañez    Plan of care discussed with bedside nurse    SIGNATURE: Demetria Ribera PA-C  DATE: September 13, 2020  TIME: 6:48 AM

## 2020-09-13 NOTE — RESTORATIVE TECHNICIAN NOTE
Restorative Specialist Mobility Note       Activity: Ambulate in [place]     Assistive Device: Other (Comment)(Held onto IV pole)

## 2020-09-13 NOTE — PROGRESS NOTES
Progress Note - Acute Pain Service    Abdirashid Romero 34 y o  female MRN: [id number]  Unit/Bed#: [facility] 421-01 Encounter: [id number]      Assessment:   Principal Problem:    Bacteremia due to Staphylococcus aureus  Active Problems:    Acute bacterial endocarditis    Septic embolism (HCC)    Bipolar 1 disorder (HCC)    Right hip pain    Intravenous drug abuse ([address] )    DVT of axillary vein, acute left (HCC)    Transaminitis    Rash    Anemia    Hyperphosphatemia    S/P TVR (tricuspid valve repair)    POD3  Pt on high dose narcotics and still reports 10/10 pain however she appears to be moving around the room fairly comfortably  There does seem to be a component of anxiety that is contributing to her pain  Plan:   · Continue acetaminophen 975 mg p o  q 8 hours scheduled  · Continue ketamine infusion at 0 2 mg/kg/HR  · Continue oxycodone 15 mg p o  q 4 hours p r n  severe pain  · Continue dilaudid 2 mg IV q 2 hours p r n  breakthrough pain  · Continue duloxetine 60 mg p o  daily scheduled  · Continue lorazepam 1 mg IV q 2 hours p r n  anxiety  · Continue gabapentin 100 mg p o  hs   Plan to discontinue on 9/14/20  · Continue lidocaine 5% patch, 2 patches for 12 hours daily  · Would add back scheduled robaxin 750mg q6hr  · Tomorrow would consider d/c ketamine and wean ativan to q4hr  APS will continue to follow; please contact APS ( btwn [phone]) with any further questions    Pain History  Current pain location(s): Anterior chest wall  Pain Scale:   10/10  Quality:  sharp  24 hour history:  CT and epicardial pacing wires removed yesterday   Pt still complaining of 10/10 sharp pain    Opioid requirement previous 24 hours:  16mg dilaudid, 75mg oxy      Meds/Allergies   all current active meds have been reviewed, current meds:   Current Facility-Administered Medications   Medication Dose Route Frequency    acetaminophen (TYLENOL) tablet 975 mg  975 mg Oral Q8H    amiodarone tablet 200 mg 200 mg Oral Q8H [address]    ascorbic acid (VITAMIN C) tablet 500 mg  500 mg Oral Daily    aspirin chewable tablet 81 mg  81 mg Oral Daily    bisacodyl (DULCOLAX) rectal suppository 10 mg  10 mg Rectal Daily PRN    calcium acetate (PHOSLO) capsule 667 mg  667 mg Oral TID With Meals    calcium carbonate (TUMS) chewable tablet 1,000 mg  1,000 mg Oral Daily PRN    dicyclomine (BENTYL) capsule 10 mg  10 mg Oral TID PRN    docusate sodium (COLACE) capsule 100 mg  100 mg Oral BID    DULoxetine (CYMBALTA) delayed release capsule 60 mg  60 mg Oral Daily    ferrous sulfate tablet 325 mg  325 mg Oral Daily With Breakfast    fondaparinux (ARIXTRA) subcutaneous injection 2 5 mg  2 5 mg Subcutaneous Q24H    furosemide (LASIX) injection 20 mg  20 mg Intravenous Daily    gabapentin (NEURONTIN) capsule 100 mg  100 mg Oral HS    glycopyrrolate (ROBINUL) injection 0 2 mg  0 2 mg Intravenous Q4H PRN    haloperidol lactate (HALDOL) injection 2 mg  2 mg Intramuscular Q30 Min PRN    HYDROmorphone (DILAUDID) injection 2 mg  2 mg Intravenous Q2H PRN    insulin lispro (HumaLOG) 100 units/mL subcutaneous injection 1-5 Units  1-5 Units Subcutaneous TID AC    iohexol (OMNIPAQUE) 240 MG/ML solution 50 mL  50 mL Oral 90 min pre-procedure    ketamine 250 mg (STANDARD CONCENTRATION) IV in sodium chloride 0 9% 250 mL  0 2 mg/kg/hr Intravenous Continuous    lidocaine (LIDODERM) 5 % patch 2 patch  2 patch Topical Daily    LORazepam (ATIVAN) injection 1 mg  1 mg Intravenous Q2H PRN    melatonin tablet 6 mg  6 mg Oral HS    metoprolol tartrate (LOPRESSOR) tablet 25 mg  25 mg Oral Q12H JEFF    miconazole 2 % cream   Topical BID    mirtazapine (REMERON) tablet 30 mg  30 mg Oral HS    mupirocin (BACTROBAN) 2 % nasal ointment 1 application  1 application Nasal [id number] JEFF    OLANZapine (ZyPREXA) tablet 5 mg  5 mg Oral HS    ondansetron (ZOFRAN) injection 4 mg  4 mg Intravenous Q6H PRN    oxyCODONE (ROXICODONE) IR tablet 15 mg  15 mg Oral Q4H PRN    pantoprazole (PROTONIX) EC tablet 40 mg  40 mg Oral Daily    polyethylene glycol (MIRALAX) packet 17 g  17 g Oral Daily    potassium chloride (K-DUR,KLOR-CON) CR tablet 10 mEq  10 mEq Oral Daily    potassium chloride 40 mEq IVPB (premix)  40 mEq Intravenous Once    saccharomyces boulardii (FLORASTOR) capsule 250 mg  250 mg Oral BID    senna (SENOKOT) tablet 8 6 mg  1 tablet Oral BID    and PTA meds:   None       Allergies   Allergen Reactions    Cat Hair Extract     Dog Epithelium     Latex     Pollen Extract        Objective     Temp:  [97 3 °F (36 3 °C)-98 3 °F (36 8 °C)] 97 3 °F (36 3 °C)  HR:  [] 106  Resp:  [17-20] 19  BP: ()/(47-64) 100/57    Physical Exam  Vitals signs reviewed  Constitutional:       General: She is awake  She is not in acute distress  Appearance: She is not toxic-appearing or diaphoretic  HENT:      Head: Normocephalic and atraumatic  Eyes:      Extraocular Movements: Extraocular movements intact  Cardiovascular:      Rate and Rhythm: Normal rate  Pulmonary:      Effort: Pulmonary effort is normal    Musculoskeletal: Normal range of motion  Skin:     General: Skin is warm and dry  Neurological:      General: No focal deficit present  Mental Status: She is alert and oriented to person, place, and time  Psychiatric:         Mood and Affect: Mood is anxious           Lab Results:   Results from last 7 days   Lab Units 09/13/20  0417   WBC Thousand/uL 6 10   HEMOGLOBIN g/dL 7 4*   HEMATOCRIT % 24 7*   PLATELETS Thousands/uL 170      Results from last 7 days   Lab Units 09/13/20  0417  09/10/20  1131  09/10/20  0449   POTASSIUM mmol/L 3 5   < >  --   --  4 1   CHLORIDE mmol/L 105   < >  --   --  105   CO2 mmol/L 28   < >  --   --  29   CO2, I-STAT mmol/L  --   --  25   < >  --    BUN mg/dL 7   < >  --   --  17   CREATININE mg/dL 0 33*   < >  --   --  0 47*   CALCIUM mg/dL 8 2*   < >  --   --  8 8   ALK PHOS U/L  --   --   --   --  174* ALT U/L  --   --   --   --  28   AST U/L  --   --   --   --  47*   GLUCOSE, ISTAT mg/dl  --   --  88   < >  --     < > = values in this interval not displayed  Imaging Studies: I have personally reviewed pertinent reports  EKG, Pathology, and Other Studies: I have personally reviewed pertinent reports  Counseling / Coordination of Care  Total floor / unit time spent today 20 minutes  Greater than 50% of total time was spent with the patient and / or family counseling and / or coordination of care  A description of the counseling / coordination of care:  Patient interview, physical examination, review of medical record, review of imaging and laboratory data, development of pain management plan, discussion of pain management plan with patient      Alesha Echavarria MD

## 2020-09-14 LAB
1,25(OH)2D3 SERPL-MCNC: 7.1 PG/ML (ref 19.9–79.3)
ALBUMIN SERPL BCP-MCNC: 2.4 G/DL (ref 3.5–5)
ALBUMIN UR ELPH-MCNC: 100 %
ALP SERPL-CCNC: 128 U/L (ref 46–116)
ALPHA1 GLOB MFR UR ELPH: 0 %
ALPHA2 GLOB MFR UR ELPH: 0 %
ALT SERPL W P-5'-P-CCNC: 14 U/L (ref 12–78)
ANION GAP SERPL CALCULATED.3IONS-SCNC: 6 MMOL/L (ref 4–13)
AST SERPL W P-5'-P-CCNC: 19 U/L (ref 5–45)
B-GLOBULIN MFR UR ELPH: 0 %
BILIRUB DIRECT SERPL-MCNC: 0.09 MG/DL (ref 0–0.2)
BILIRUB SERPL-MCNC: 0.21 MG/DL (ref 0.2–1)
BUN SERPL-MCNC: 8 MG/DL (ref 5–25)
CALCIUM SERPL-MCNC: 8 MG/DL (ref 8.3–10.1)
CHLORIDE SERPL-SCNC: 106 MMOL/L (ref 100–108)
CO2 SERPL-SCNC: 29 MMOL/L (ref 21–32)
CREAT SERPL-MCNC: 0.32 MG/DL (ref 0.6–1.3)
CRP SERPL QL: 113 MG/L
ERYTHROCYTE [DISTWIDTH] IN BLOOD BY AUTOMATED COUNT: 14.3 % (ref 11.6–15.1)
GAMMA GLOB MFR UR ELPH: 0 %
GFR SERPL CREATININE-BSD FRML MDRD: 154 ML/MIN/1.73SQ M
GLUCOSE SERPL-MCNC: 101 MG/DL (ref 65–140)
GLUCOSE SERPL-MCNC: 111 MG/DL (ref 65–140)
GLUCOSE SERPL-MCNC: 113 MG/DL (ref 65–140)
GLUCOSE SERPL-MCNC: 120 MG/DL (ref 65–140)
GLUCOSE SERPL-MCNC: 98 MG/DL (ref 65–140)
HCT VFR BLD AUTO: 22.4 % (ref 34.8–46.1)
HGB BLD-MCNC: 7 G/DL (ref 11.5–15.4)
INR PPP: 1.56 (ref 0.84–1.19)
MCH RBC QN AUTO: 27.7 PG (ref 26.8–34.3)
MCHC RBC AUTO-ENTMCNC: 31.3 G/DL (ref 31.4–37.4)
MCV RBC AUTO: 89 FL (ref 82–98)
PHOSPHATE SERPL-MCNC: 4.9 MG/DL (ref 2.7–4.5)
PHOSPHATE SERPL-MCNC: 5.7 MG/DL (ref 2.7–4.5)
PLATELET # BLD AUTO: 181 THOUSANDS/UL (ref 149–390)
PMV BLD AUTO: 9.1 FL (ref 8.9–12.7)
POTASSIUM SERPL-SCNC: 3.5 MMOL/L (ref 3.5–5.3)
PROT PATTERN UR ELPH-IMP: NORMAL
PROT SERPL-MCNC: 6.1 G/DL (ref 6.4–8.2)
PROT UR-MCNC: 16 MG/DL
PROTHROMBIN TIME: 18.7 SECONDS (ref 11.6–14.5)
RBC # BLD AUTO: 2.53 MILLION/UL (ref 3.81–5.12)
SODIUM SERPL-SCNC: 141 MMOL/L (ref 136–145)
WBC # BLD AUTO: 5.42 THOUSAND/UL (ref 4.31–10.16)

## 2020-09-14 PROCEDURE — 86140 C-REACTIVE PROTEIN: CPT | Performed by: PHYSICIAN ASSISTANT

## 2020-09-14 PROCEDURE — 99024 POSTOP FOLLOW-UP VISIT: CPT | Performed by: PHYSICIAN ASSISTANT

## 2020-09-14 PROCEDURE — 84100 ASSAY OF PHOSPHORUS: CPT | Performed by: INTERNAL MEDICINE

## 2020-09-14 PROCEDURE — 85027 COMPLETE CBC AUTOMATED: CPT | Performed by: PHYSICIAN ASSISTANT

## 2020-09-14 PROCEDURE — 85610 PROTHROMBIN TIME: CPT | Performed by: PHYSICIAN ASSISTANT

## 2020-09-14 PROCEDURE — 97535 SELF CARE MNGMENT TRAINING: CPT

## 2020-09-14 PROCEDURE — 82948 REAGENT STRIP/BLOOD GLUCOSE: CPT

## 2020-09-14 PROCEDURE — 99232 SBSQ HOSP IP/OBS MODERATE 35: CPT | Performed by: INTERNAL MEDICINE

## 2020-09-14 PROCEDURE — 84100 ASSAY OF PHOSPHORUS: CPT | Performed by: PHYSICIAN ASSISTANT

## 2020-09-14 PROCEDURE — 80076 HEPATIC FUNCTION PANEL: CPT | Performed by: PHYSICIAN ASSISTANT

## 2020-09-14 PROCEDURE — 80048 BASIC METABOLIC PNL TOTAL CA: CPT | Performed by: PHYSICIAN ASSISTANT

## 2020-09-14 PROCEDURE — 99232 SBSQ HOSP IP/OBS MODERATE 35: CPT | Performed by: PHYSICIAN ASSISTANT

## 2020-09-14 PROCEDURE — 99233 SBSQ HOSP IP/OBS HIGH 50: CPT | Performed by: INTERNAL MEDICINE

## 2020-09-14 RX ORDER — LORAZEPAM 2 MG/ML
1.5 INJECTION INTRAMUSCULAR EVERY 2 HOUR PRN
Status: DISCONTINUED | OUTPATIENT
Start: 2020-09-14 | End: 2020-09-17

## 2020-09-14 RX ORDER — WARFARIN SODIUM 5 MG/1
5 TABLET ORAL
Status: COMPLETED | OUTPATIENT
Start: 2020-09-14 | End: 2020-09-14

## 2020-09-14 RX ORDER — CEFAZOLIN SODIUM 2 G/50ML
2000 SOLUTION INTRAVENOUS EVERY 8 HOURS
Status: DISCONTINUED | OUTPATIENT
Start: 2020-09-14 | End: 2020-09-26

## 2020-09-14 RX ADMIN — METHOCARBAMOL TABLETS 750 MG: 750 TABLET, COATED ORAL at 05:50

## 2020-09-14 RX ADMIN — METOPROLOL TARTRATE 25 MG: 25 TABLET, FILM COATED ORAL at 08:51

## 2020-09-14 RX ADMIN — METOPROLOL TARTRATE 25 MG: 25 TABLET, FILM COATED ORAL at 20:07

## 2020-09-14 RX ADMIN — LORAZEPAM 1.5 MG: 2 INJECTION INTRAMUSCULAR; INTRAVENOUS at 17:37

## 2020-09-14 RX ADMIN — MAGNESIUM OXIDE TAB 400 MG (241.3 MG ELEMENTAL MG) 400 MG: 400 (241.3 MG) TAB at 17:36

## 2020-09-14 RX ADMIN — OXYCODONE HYDROCHLORIDE 15 MG: 10 TABLET ORAL at 14:32

## 2020-09-14 RX ADMIN — ACETAMINOPHEN 975 MG: 325 TABLET, FILM COATED ORAL at 02:46

## 2020-09-14 RX ADMIN — LORAZEPAM 1 MG: 2 INJECTION INTRAMUSCULAR; INTRAVENOUS at 04:11

## 2020-09-14 RX ADMIN — CEFAZOLIN SODIUM 2000 MG: 2 SOLUTION INTRAVENOUS at 15:53

## 2020-09-14 RX ADMIN — CEFAZOLIN SODIUM 2000 MG: 2 SOLUTION INTRAVENOUS at 23:35

## 2020-09-14 RX ADMIN — CALCIUM ACETATE 667 MG: 667 CAPSULE ORAL at 11:52

## 2020-09-14 RX ADMIN — METHOCARBAMOL TABLETS 750 MG: 750 TABLET, COATED ORAL at 23:35

## 2020-09-14 RX ADMIN — OXYCODONE HYDROCHLORIDE 15 MG: 10 TABLET ORAL at 01:58

## 2020-09-14 RX ADMIN — LORAZEPAM 1.5 MG: 2 INJECTION INTRAMUSCULAR; INTRAVENOUS at 14:32

## 2020-09-14 RX ADMIN — WARFARIN SODIUM 5 MG: 5 TABLET ORAL at 17:36

## 2020-09-14 RX ADMIN — LORAZEPAM 1.5 MG: 2 INJECTION INTRAMUSCULAR; INTRAVENOUS at 21:32

## 2020-09-14 RX ADMIN — DOCUSATE SODIUM 100 MG: 100 CAPSULE, LIQUID FILLED ORAL at 08:51

## 2020-09-14 RX ADMIN — MIRTAZAPINE 30 MG: 30 TABLET, FILM COATED ORAL at 20:07

## 2020-09-14 RX ADMIN — HYDROMORPHONE HYDROCHLORIDE 2 MG: 1 INJECTION, SOLUTION INTRAMUSCULAR; INTRAVENOUS; SUBCUTANEOUS at 02:46

## 2020-09-14 RX ADMIN — OXYCODONE HYDROCHLORIDE 15 MG: 10 TABLET ORAL at 05:56

## 2020-09-14 RX ADMIN — FERROUS SULFATE TAB 325 MG (65 MG ELEMENTAL FE) 325 MG: 325 (65 FE) TAB at 06:50

## 2020-09-14 RX ADMIN — CEFAZOLIN SODIUM 2000 MG: 2 SOLUTION INTRAVENOUS at 08:51

## 2020-09-14 RX ADMIN — ACETAMINOPHEN 975 MG: 325 TABLET, FILM COATED ORAL at 20:07

## 2020-09-14 RX ADMIN — MUPIROCIN 1 APPLICATION: 20 OINTMENT TOPICAL at 20:07

## 2020-09-14 RX ADMIN — HYDROMORPHONE HYDROCHLORIDE 2 MG: 1 INJECTION, SOLUTION INTRAMUSCULAR; INTRAVENOUS; SUBCUTANEOUS at 04:39

## 2020-09-14 RX ADMIN — ACETAMINOPHEN 975 MG: 325 TABLET, FILM COATED ORAL at 11:51

## 2020-09-14 RX ADMIN — LIDOCAINE 5% 2 PATCH: 700 PATCH TOPICAL at 06:02

## 2020-09-14 RX ADMIN — CALCIUM ACETATE 667 MG: 667 CAPSULE ORAL at 06:50

## 2020-09-14 RX ADMIN — AMIODARONE HYDROCHLORIDE 200 MG: 200 TABLET ORAL at 21:32

## 2020-09-14 RX ADMIN — HYDROMORPHONE HYDROCHLORIDE 2 MG: 1 INJECTION, SOLUTION INTRAMUSCULAR; INTRAVENOUS; SUBCUTANEOUS at 15:55

## 2020-09-14 RX ADMIN — HYDROMORPHONE HYDROCHLORIDE 2 MG: 1 INJECTION, SOLUTION INTRAMUSCULAR; INTRAVENOUS; SUBCUTANEOUS at 08:52

## 2020-09-14 RX ADMIN — SENNOSIDES 8.6 MG: 8.6 TABLET ORAL at 08:51

## 2020-09-14 RX ADMIN — METHOCARBAMOL TABLETS 750 MG: 750 TABLET, COATED ORAL at 11:59

## 2020-09-14 RX ADMIN — OXYCODONE HYDROCHLORIDE 15 MG: 10 TABLET ORAL at 10:09

## 2020-09-14 RX ADMIN — Medication 250 MG: at 17:36

## 2020-09-14 RX ADMIN — PANTOPRAZOLE SODIUM 40 MG: 40 TABLET, DELAYED RELEASE ORAL at 08:51

## 2020-09-14 RX ADMIN — POTASSIUM CHLORIDE 10 MEQ: 750 TABLET, EXTENDED RELEASE ORAL at 08:51

## 2020-09-14 RX ADMIN — Medication 0.2 MG/KG/HR: at 04:24

## 2020-09-14 RX ADMIN — HYDROMORPHONE HYDROCHLORIDE 2 MG: 1 INJECTION, SOLUTION INTRAMUSCULAR; INTRAVENOUS; SUBCUTANEOUS at 06:45

## 2020-09-14 RX ADMIN — MELATONIN 6 MG: at 20:06

## 2020-09-14 RX ADMIN — LORAZEPAM 1 MG: 2 INJECTION INTRAMUSCULAR; INTRAVENOUS at 00:41

## 2020-09-14 RX ADMIN — OXYCODONE HYDROCHLORIDE 15 MG: 10 TABLET ORAL at 22:27

## 2020-09-14 RX ADMIN — HYDROMORPHONE HYDROCHLORIDE 2 MG: 1 INJECTION, SOLUTION INTRAMUSCULAR; INTRAVENOUS; SUBCUTANEOUS at 20:11

## 2020-09-14 RX ADMIN — HYDROMORPHONE HYDROCHLORIDE 2 MG: 1 INJECTION, SOLUTION INTRAMUSCULAR; INTRAVENOUS; SUBCUTANEOUS at 18:19

## 2020-09-14 RX ADMIN — HYDROMORPHONE HYDROCHLORIDE 2 MG: 1 INJECTION, SOLUTION INTRAMUSCULAR; INTRAVENOUS; SUBCUTANEOUS at 13:12

## 2020-09-14 RX ADMIN — Medication 250 MG: at 08:51

## 2020-09-14 RX ADMIN — MAGNESIUM OXIDE TAB 400 MG (241.3 MG ELEMENTAL MG) 400 MG: 400 (241.3 MG) TAB at 08:51

## 2020-09-14 RX ADMIN — FUROSEMIDE 20 MG: 10 INJECTION, SOLUTION INTRAMUSCULAR; INTRAVENOUS at 08:51

## 2020-09-14 RX ADMIN — OXYCODONE HYDROCHLORIDE AND ACETAMINOPHEN 500 MG: 500 TABLET ORAL at 08:51

## 2020-09-14 RX ADMIN — HYDROMORPHONE HYDROCHLORIDE 2 MG: 1 INJECTION, SOLUTION INTRAMUSCULAR; INTRAVENOUS; SUBCUTANEOUS at 10:51

## 2020-09-14 RX ADMIN — LORAZEPAM 1 MG: 2 INJECTION INTRAMUSCULAR; INTRAVENOUS at 11:15

## 2020-09-14 RX ADMIN — METHOCARBAMOL TABLETS 750 MG: 750 TABLET, COATED ORAL at 17:36

## 2020-09-14 RX ADMIN — AMIODARONE HYDROCHLORIDE 200 MG: 200 TABLET ORAL at 05:50

## 2020-09-14 RX ADMIN — Medication 0.1 MG/KG/HR: at 15:57

## 2020-09-14 RX ADMIN — ASPIRIN 81 MG CHEWABLE TABLET 81 MG: 81 TABLET CHEWABLE at 08:51

## 2020-09-14 RX ADMIN — CALCIUM ACETATE 667 MG: 667 CAPSULE ORAL at 15:53

## 2020-09-14 RX ADMIN — SENNOSIDES 8.6 MG: 8.6 TABLET ORAL at 17:36

## 2020-09-14 RX ADMIN — AMIODARONE HYDROCHLORIDE 200 MG: 200 TABLET ORAL at 14:32

## 2020-09-14 RX ADMIN — OLANZAPINE 5 MG: 5 TABLET, FILM COATED ORAL at 20:06

## 2020-09-14 RX ADMIN — HYDROMORPHONE HYDROCHLORIDE 2 MG: 1 INJECTION, SOLUTION INTRAMUSCULAR; INTRAVENOUS; SUBCUTANEOUS at 23:36

## 2020-09-14 RX ADMIN — DULOXETINE HYDROCHLORIDE 60 MG: 60 CAPSULE, DELAYED RELEASE ORAL at 08:51

## 2020-09-14 RX ADMIN — FONDAPARINUX SODIUM 2.5 MG: 2.5 INJECTION, SOLUTION SUBCUTANEOUS at 11:52

## 2020-09-14 RX ADMIN — DOCUSATE SODIUM 100 MG: 100 CAPSULE, LIQUID FILLED ORAL at 17:36

## 2020-09-14 RX ADMIN — LORAZEPAM 1 MG: 2 INJECTION INTRAMUSCULAR; INTRAVENOUS at 09:14

## 2020-09-14 NOTE — PROGRESS NOTES
Progress Note - Acute Pain Service    Brit Gaitan 34 y o  female MRN: [id number]  Unit/Bed#: [facility] 421-01 Encounter: [id number]      Assessment:   Principal Problem:    Bacteremia due to Staphylococcus aureus  Active Problems:    Acute bacterial endocarditis    Septic embolism (HCC)    Bipolar 1 disorder (HCC)    Right hip pain    Intravenous drug abuse ([address] )    DVT of axillary vein, acute left (HCC)    Transaminitis    Rash    Anemia    Hyperphosphatemia    S/P TVR (tricuspid valve repair)      Plan:   · Continue acetaminophen 975 mg p o  q 8 hours scheduled  · Continue oxycodone 15 mg p o  q 4 hours p r n  severe pain  · Continue hydromorphone 2 mg IV q 2 hours p r n  breakthrough pain  · Will decrease ketamine to 0 1 mg/kg/HR  · Suggest increase lorazepam to 1 5 mg IV q 2 hours p r n  anxiety  · Continue duloxetine 60 mg p o  daily  · Continue olanzapine 5 mg p o  hs scheduled  · Gabapentin discontinues today  · Continue methocarbamol 750 mg p o  q 6 hours scheduled  · Continue lidocaine 5% patch, 2 patches for 12 hours daily  · Plan to decrease Dilaudid to 2 mg IV q 4 hours p r n  starting tomorrow, will continue to wean IV Dilaudid over next 2 weeks  Will continue to wean Ativan over next 2 weeks as well  Plan to have patient on minimal doses of benzodiazepines and opioids by September 26th which is her tentative last day of IV antibiotics  APS will continue to follow; please contact APS ( btwn [phone]) with any further questions    Pain History  Current pain location(s):  Right hip, anterior chest, upper midback  Pain Scale:   10/10  Quality:  Deep ache  24 hour history:  Patient's pain improving over the weekend, currently appears relatively comfortable with mild visible distress  States the pain in her chest has improved but is still significant  Continues to complain of mid upper back pain and right hip pain    On my arrival to the room, patient got up and out of bed with minimal difficulty and walked across the room without prompting      Opioid requirement previous 24 hours:  Oxycodone 90 mg p o , hydromorphone 22 mg IV    Meds/Allergies   all current active meds have been reviewed, current meds:   Current Facility-Administered Medications   Medication Dose Route Frequency    acetaminophen (TYLENOL) tablet 975 mg  975 mg Oral Q8H    amiodarone tablet 200 mg  200 mg Oral Q8H [address]    ascorbic acid (VITAMIN C) tablet 500 mg  500 mg Oral Daily    aspirin chewable tablet 81 mg  81 mg Oral Daily    bisacodyl (DULCOLAX) rectal suppository 10 mg  10 mg Rectal Daily PRN    calcium acetate (PHOSLO) capsule 667 mg  667 mg Oral TID With Meals    calcium carbonate (TUMS) chewable tablet 1,000 mg  1,000 mg Oral Daily PRN    ceFAZolin (ANCEF) IVPB (premix) 2,000 mg 50 mL  2,000 mg Intravenous Q8H    dicyclomine (BENTYL) capsule 10 mg  10 mg Oral TID PRN    docusate sodium (COLACE) capsule 100 mg  100 mg Oral BID    DULoxetine (CYMBALTA) delayed release capsule 60 mg  60 mg Oral Daily    ferrous sulfate tablet 325 mg  325 mg Oral Daily With Breakfast    fondaparinux (ARIXTRA) subcutaneous injection 2 5 mg  2 5 mg Subcutaneous Q24H    furosemide (LASIX) injection 20 mg  20 mg Intravenous Daily    glycopyrrolate (ROBINUL) injection 0 2 mg  0 2 mg Intravenous Q4H PRN    haloperidol lactate (HALDOL) injection 2 mg  2 mg Intramuscular Q30 Min PRN    HYDROmorphone (DILAUDID) injection 2 mg  2 mg Intravenous Q2H PRN    insulin lispro (HumaLOG) 100 units/mL subcutaneous injection 1-5 Units  1-5 Units Subcutaneous TID AC    iohexol (OMNIPAQUE) 240 MG/ML solution 50 mL  50 mL Oral 90 min pre-procedure    ketamine 250 mg (STANDARD CONCENTRATION) IV in sodium chloride 0 9% 250 mL  0 2 mg/kg/hr Intravenous Continuous    lidocaine (LIDODERM) 5 % patch 2 patch  2 patch Topical Daily    LORazepam (ATIVAN) injection 1 5 mg  1 5 mg Intravenous Q2H PRN    magnesium oxide (MAG-OX) tablet 400 mg 400 mg Oral BID    melatonin tablet 6 mg  6 mg Oral HS    methocarbamol (ROBAXIN) tablet 750 mg  750 mg Oral Q6H [address]    metoprolol tartrate (LOPRESSOR) tablet 25 mg  25 mg Oral Q12H [address]    miconazole 2 % cream   Topical BID    mirtazapine (REMERON) tablet 30 mg  30 mg Oral HS    mupirocin (BACTROBAN) 2 % nasal ointment 1 application  1 application Nasal V59V [address]    OLANZapine (ZyPREXA) tablet 5 mg  5 mg Oral HS    ondansetron (ZOFRAN) injection 4 mg  4 mg Intravenous Q6H PRN    oxyCODONE (ROXICODONE) IR tablet 15 mg  15 mg Oral Q4H PRN    pantoprazole (PROTONIX) EC tablet 40 mg  40 mg Oral Daily    polyethylene glycol (MIRALAX) packet 17 g  17 g Oral Daily    potassium chloride (K-DUR,KLOR-CON) CR tablet 10 mEq  10 mEq Oral Daily    saccharomyces boulardii (FLORASTOR) capsule 250 mg  250 mg Oral BID    senna (SENOKOT) tablet 8 6 mg  1 tablet Oral BID    warfarin (COUMADIN) tablet 5 mg  5 mg Oral Once (warfarin)    and PTA meds:   None       Allergies   Allergen Reactions    Cat Hair Extract     Dog Epithelium     Latex     Pollen Extract        Objective     Temp:  [98 °F (36 7 °C)-99 1 °F (37 3 °C)] 98 7 °F (37 1 °C)  HR:  [] 77  Resp:  [16-18] 16  BP: ()/(53-70) 97/53    Physical Exam  Vitals signs and nursing note reviewed  Constitutional:       General: She is awake  She is not in acute distress  Appearance: She is not ill-appearing or toxic-appearing  Eyes:      Pupils: Pupils are equal, round, and reactive to light  Cardiovascular:      Rate and Rhythm: Normal rate and regular rhythm  Skin:     General: Skin is warm and dry  Neurological:      General: No focal deficit present  Mental Status: She is alert and oriented to person, place, and time  GCS: GCS eye subscore is 4  GCS verbal subscore is 5  GCS motor subscore is 6  Psychiatric:         Behavior: Behavior is cooperative           Lab Results:   Results from last 7 days   Lab Units 09/14/20  0880 WBC Thousand/uL 5 42   HEMOGLOBIN g/dL 7 0*   HEMATOCRIT % 22 4*   PLATELETS Thousands/uL 181      Results from last 7 days   Lab Units 09/14/20  0438 09/14/20  0437  09/10/20  1131   POTASSIUM mmol/L 3 5  --    < >  --    CHLORIDE mmol/L 106  --    < >  --    CO2 mmol/L 29  --    < >  --    CO2, I-STAT mmol/L  --   --   --  25   BUN mg/dL 8  --    < >  --    CREATININE mg/dL 0 32*  --    < >  --    CALCIUM mg/dL 8 0*  --    < >  --    ALK PHOS U/L  --  128*  --   --    ALT U/L  --  14  --   --    AST U/L  --  19  --   --    GLUCOSE, ISTAT mg/dl  --   --   --  88    < > = values in this interval not displayed  Imaging Studies: I have personally reviewed pertinent reports  EKG, Pathology, and Other Studies: I have personally reviewed pertinent reports  Counseling / Coordination of Care  Total floor / unit time spent today 30 minutes  Greater than 50% of total time was spent with the patient and / or family counseling and / or coordination of care  A description of the counseling / coordination of care:  Patient interview, physical examination, review of medical record, review of imaging and laboratory data, development of pain management plan, development of opioid and benzodiazepine wean, discussion of plan with patient and primary service      Leon Kingsley PA-C

## 2020-09-14 NOTE — PROGRESS NOTES
On call anesthesiologist called and okay'd to give PRN dilaudid early for patient is unable to wait until it is due  She is crying and complaining of 10/10 incisional pain  She stated she exerted herself and strained and that contributed to her severe pain  PRN ativan was given at 0411, PRN dilaudid given at 0439  Explained to patient she needs to relax and cannot have pain medicine until her 0600 oxycodone and scheduled robaxin  She stated she understands  Will continue to monitor

## 2020-09-14 NOTE — PLAN OF CARE
Problem: OCCUPATIONAL THERAPY ADULT  Goal: Performs self-care activities at highest level of function for planned discharge setting  See evaluation for individualized goals  Description: Treatment Interventions: ADL retraining, Functional transfer training, Endurance training, UE strengthening/ROM, Patient/family training, Equipment evaluation/education, Compensatory technique education, Activityengagement, Energy conservation  Equipment Recommended: Other (comment), Bedside commode, Tub seat with back(RW)       See flowsheet documentation for full assessment, interventions and recommendations  Outcome: Completed  Note: Limitation: Decreased ADL status, Decreased endurance, Decreased self-care trans, Decreased high-level ADLs  Prognosis: Fair  Assessment: Pt participated in additional OT session focusing on cardiac education  Provided pt with Recovering After Cardiac Surgery packet and educated pt regarding; sternal precautions, cardiac precautions, lifiting restrictions, safe activity engagement, energy conservation, lifestyle modifications, stress management and cardiac rehabilitation programs  Pt's questions were addressed after discussion of the packet  Provided pt with contact information for OT department if questions arrise  Pt is limited by decreased endurance and decreased ability to complete higher level ADLs, however her family will be home and able to assist as needed upon d/c  Rec pt cont participation in self care after s/u w/ nrsg staff and cont mobility w/ non OT staff while in the hospital  Pt reports walking w/ nursing, observed walking Mod I, pt reports Independently donning pants this am   Pt denies any questions or concerns from an OT standpoint at this time  Rec pt return home w/ increased family support upon d/c  D/C OT  OT Discharge Recommendation: Home with skilled therapy  OT - OK to Discharge:  Yes

## 2020-09-14 NOTE — OCCUPATIONAL THERAPY NOTE
OccupationalTherapy Progress Note     Patient Name: Brit KING Date: 9/14/2020  Problem List  Principal Problem:    Bacteremia due to Staphylococcus aureus  Active Problems:    Acute bacterial endocarditis    Septic embolism (HCC)    Bipolar 1 disorder (HCC)    Right hip pain    Intravenous drug abuse ([address] )    DVT of axillary vein, acute left (HCC)    Transaminitis    Rash    Anemia    Hyperphosphatemia    S/P TVR (tricuspid valve repair)          09/14/20 1353   OT Last Visit   OT Visit Date 09/14/20   Restrictions/Precautions   Weight Bearing Precautions Per Order No   Other Precautions Cardiac/sternal   General   Response to Previous Treatment Patient with no complaints from previous session   Lifestyle   Autonomy I ADL's/shares homemaking tasks with mother, no AD with functional ambulation, +drives, unemployed   Reciprocal Relationships mother, stepfather, brother   Service to Others Pt is unemployed  Intrinsic Gratification Pt reports enjoying spending time w/ her dtr      Pain Assessment   Pain Assessment Tool FLACC   Pain Score No Pain   Pain Rating: FLACC (Rest) - Face 0   Pain Rating: FLACC (Rest) - Legs 0   Pain Rating: FLACC (Rest) - Activity 0   Pain Rating: FLACC (Rest) - Cry 0   Pain Rating: FLACC (Rest) - Consolability 0   Score: FLACC (Rest) 0   Pain Rating: FLACC (Activity) - Face 0   Pain Rating: FLACC (Activity) - Legs 0   Pain Rating: FLACC (Activity) - Activity 0   Pain Rating: FLACC (Activity) - Cry 0   Pain Rating: FLACC (Activity) - Consolability 0   Score: FLACC (Activity) 0   Cognition   Overall Cognitive Status WFL   Arousal/Participation Alert   Attention Within functional limits   Orientation Level Oriented X4   Memory Within functional limits   Following Commands Follows all commands and directions without difficulty   Activity Tolerance   Activity Tolerance Patient tolerated treatment well;Patient limited by fatigue   Medical Staff Made Aware FERNANDO Gee Assessment   Assessment Pt participated in additional OT session focusing on cardiac education  Provided pt with Recovering After Cardiac Surgery packet and educated pt regarding; sternal precautions, cardiac precautions, lifiting restrictions, safe activity engagement, energy conservation, lifestyle modifications, stress management and cardiac rehabilitation programs  Pt's questions were addressed after discussion of the packet  Provided pt with contact information for OT department if questions arrise  Pt is limited by decreased endurance and decreased ability to complete higher level ADLs, however her family will be home and able to assist as needed upon d/c  Rec pt cont participation in self care after s/u w/ nrsg staff and cont mobility w/ non OT staff while in the hospital  Pt reports walking w/ nursing, observed walking Mod I, pt reports Independently donning pants this am   Pt denies any questions or concerns from an OT standpoint at this time  Rec pt return home w/ increased family support upon d/c  D/C OT     Plan   Treatment Interventions Patient/family training;Cardiac education   Goal Expiration Date 09/26/20   OT Treatment Day 1   OT Frequency 3-5x/wk   Recommendation   OT Discharge Recommendation Home with skilled therapy   OT - OK to Discharge Yes   Modified Jackson Scale   Modified Booker Scale 3     Lelo Vera MS, OTR/L

## 2020-09-14 NOTE — QUICK NOTE
Phosphate was normal when was sent from peripheral blood draw,   Again this morning was drawn from PICC line which was heparinized earlier and came back high, repeated was high too    Will recheck pair phosphate tomorrow am from PICC line and peripheral

## 2020-09-14 NOTE — PROGRESS NOTES
Progress Note - Cardiothoracic Surgery   Margaret Severino 34 y o  female MRN: [id number]  Unit/Bed#: [facility] 421-01 Encounter: [id number]    Severe Tricuspid Regurgitation, Tricuspid Valve Endocarditis  S/P tricuspid valve repair; POD # 4      24 Hour Events: No issues overnight  Pain controlled with current regimen  Patient afebrile  Cultures continue to be negative from OR specimen       Medications:   Scheduled Meds:  Current Facility-Administered Medications   Medication Dose Route Frequency Provider Last Rate    acetaminophen  975 mg Oral Q8H Pam Burton PA-C      amiodarone  200 mg Oral Q8H [address] Pam Burton PA-C      ascorbic acid  500 mg Oral Daily Nara Christiansen PA-C      aspirin  81 mg Oral Daily Pam Burton PA-C      bisacodyl  10 mg Rectal Daily PRN Pam Burton PA-C      calcium acetate  667 mg Oral TID With Meals Pam Burton PA-C      calcium carbonate  1,000 mg Oral Daily PRN Pam Burton PA-C      cefazolin  2,000 mg Intravenous Q8H Wiliam ZeMD charisma 2,000 mg (09/14/20 0851)    dicyclomine  10 mg Oral TID PRN Pam Burton PA-C      docusate sodium  100 mg Oral BID Pam Burton PA-C      DULoxetine  60 mg Oral Daily Pam Burton PA-C      ferrous sulfate  325 mg Oral Daily With Breakfast Amara Browning PA-C      fondaparinux  2 5 mg Subcutaneous Q24H Amara Browning PA-C      furosemide  20 mg Intravenous Daily Amara Browning PA-C      glycopyrrolate  0 2 mg Intravenous Q4H PRN Pam Burton PA-C      haloperidol lactate  2 mg Intramuscular Q30 Min PRN Pam Burton PA-C      HYDROmorphone  2 mg Intravenous Q2H PRN Nara Christiansen PA-C      insulin lispro  1-5 Units Subcutaneous TID ADDIE Judd PA-C      iohexol  50 mL Oral 90 min pre-procedure Pam Burton PA-C      ketamine  0 2 mg/kg/hr Intravenous Continuous Pam Burton PA-C 0 2 mg/kg/hr (09/14/20 0954)    lidocaine  2 patch Topical Daily Vasile Armijo PA-C      LORazepam  1 5 mg Intravenous Q2H PRN Sean Javier PA-C      magnesium oxide  400 mg Oral BID Amara Browning PA-C      melatonin  6 mg Oral HS Amara Browning PA-C      methocarbamol  750 mg Oral Q6H [facility] Amara Browning PA-C      metoprolol tartrate  25 mg Oral Q12H [facility] Amara Browning PA-C      miconazole   Topical BID Vasile Armijo PA-C      mirtazapine  30 mg Oral HS Demetria Ribera PA-C      mupirocin  1 application Nasal E54E [facility] Vasile Armijo PA-C      OLANZapine  5 mg Oral HS Amara Browning PA-C      ondansetron  4 mg Intravenous Q6H PRN Vasile Armijo PA-C      oxyCODONE  15 mg Oral Q4H PRN Vasile Armijo PA-C      pantoprazole  40 mg Oral Daily Vasile Armijo PA-C      polyethylene glycol  17 g Oral Daily Vasile Armijo PA-C      potassium chloride  10 mEq Oral Daily Amara Browning PA-C      saccharomyces boulardii  250 mg Oral BID Vasile Armijo PA-C      senna  1 tablet Oral BID Vasile Armijo PA-C       Continuous Infusions:ketamine, 0 2 mg/kg/hr, Last Rate: 0 2 mg/kg/hr (09/14/20 0424)      PRN Meds: bisacodyl    calcium carbonate    dicyclomine    glycopyrrolate    haloperidol lactate    HYDROmorphone    LORazepam    ondansetron    oxyCODONE    Vitals: SBP 100s, HR 90-120s  Vitals:    09/14/20 0600 09/14/20 0823 09/14/20 0852 09/14/20 1124   BP:   100/57 97/53   BP Location:    Right arm   Pulse:  87 84 77   Resp:  17  16   Temp:  98 1 °F (36 7 °C)  98 7 °F (37 1 °C)   TempSrc:  Oral  Oral   SpO2:  95%  96%   Weight: 72 kg (158 lb 11 7 oz)      Height:           Telemetry: Sinus Rhythm; 70's    Respiratory:   SpO2: SpO2: 96 %;  Room Air    Intake/Output:     Intake/Output Summary (Last 24 hours) at 9/14/2020 1323  Last data filed at 9/14/2020 1124  Gross per 24 hour   Intake    Output 2800 ml   Net -2800 ml         Weights:   Weight (last 2 days)     Date/Time   Weight    09/14/20 0600   72 (158 73) 09/12/20 0600   71 4 (157 41)            Admit weight: 67 9 kg    Results:   Results from last 7 days   Lab Units 09/14/20  0853 09/13/20  0417 09/12/20  1004 09/12/20  0921   WBC Thousand/uL 5 42 6 10  --  6 08   HEMOGLOBIN g/dL 7 0* 7 4* 7 5* 6 4*   HEMATOCRIT % 22 4* 24 7* 24 2* 20 8*   PLATELETS Thousands/uL 181 170  --  115*     Results from last 7 days   Lab Units 09/14/20  0438 09/13/20  0417 09/12/20  0605  09/10/20  1131   SODIUM mmol/L 141 140 138   < >  --    POTASSIUM mmol/L 3 5 3 5 3 6   < >  --    CHLORIDE mmol/L 106 105 104   < >  --    CO2 mmol/L 29 28 27   < >  --    CO2, I-STAT mmol/L  --   --   --   --  25   BUN mg/dL 8 7 8   < >  --    CREATININE mg/dL 0 32* 0 33* 0 37*   < >  --    GLUCOSE, ISTAT mg/dl  --   --   --   --  88   CALCIUM mg/dL 8 0* 8 2* 8 3   < >  --     < > = values in this interval not displayed  Results from last 7 days   Lab Units 09/14/20  0438 09/13/20  0417 09/10/20  1838   INR  1 56* 1 16 1 06   PTT seconds  --   --  33     9/12 Coumadin 5 mg    Point of care glucose: 110-138    Studies:  No new studies      Invasive Lines/Tubes:  Invasive Devices     Peripherally Inserted Central Catheter Line            PICC Line 08/26/20 18 days                Physical Exam:  Refused examination as she "just got back into bed"        Assessment:  Principal Problem:    Bacteremia due to Staphylococcus aureus  Active Problems:    Acute bacterial endocarditis    Septic embolism (HCC)    Bipolar 1 disorder (HCC)    Right hip pain    Intravenous drug abuse ([address] )    DVT of axillary vein, acute left (HCC)    Transaminitis    Rash    Anemia    Hyperphosphatemia    S/P TVR (tricuspid valve repair)       Severe Tricuspid Regurgitation, Tricuspid Valve Endocarditis  S/P tricuspid valve repair; POD # 4    Plan:    1   Cardiac:   Sinus Tachycardia; BP well-controlled  Lopressor, 25mg PO BID  Continue ASA and Statin therapy  Epicardial pacing wires out  PICC present  Discontinue central line  Continue DVT prophylaxis  Anticoagulation needed for acute left axillary DVT, INR 1 56, dose Coumadin tonight    2  Pulmonary:   Good Room air oxygen saturation; Continue incentive spirometry/Coughing/Deep breathing exercises  Chest tubes have been discontinued    3  Renal:   Intake/Output net: -2 2 mL/24 hours  Continue diuresis   Lasix 20 mg IV QD  Potassium Chloride 20 mEq PO QD  Replete K of 3 5  Replete Mag - Mag Ox   Post op Creatinine stable; Follow up labs prn  Hyperphosphatemia resolved, Phos 3 4 on PhosLo 1 tablet TID, monitor phosphate level  Urinary retention - reinsert yuen    4  Neuro:  Neurologically intact; No active issues  Post-op pain regimen managed by Acute Pain Services  Continue acetaminophen 975 mg p o  q 8 hours scheduled  Continue ketamine infusion at 0 2 mg/kg/HR  Continue oxycodone 15 mg p o  q 4 hours p r n  severe pain  Decrease to dilaudid 2 mg IV q 3 hours p r n  breakthrough pain  Continue duloxetine 60 mg p o  daily scheduled  Decrease to lorazepam 1 mg IV q 4 hours p r n  anxiety  Continue gabapentin 100 mg p o  hs   Plan to discontinue on 9/14/20  Continue lidocaine 5% patch, 2 patches for 12 hours daily  5  GI:  Tolerating TLC 2 3 gm sodium diet  Maintain 1800 mL daily fluid restriction   Continue stool softeners and prn suppository  Continue GI prophylaxis  Chronic HCV infection - monitor LFTs     6  Endo:   No history of diabetes; Glucose well-controlled with sliding scale coverage    7    Hematology:    Post-operative acute blood loss anemia; Hemoglobin 7 4; trend prn    Start Iron and Vitamin C    8   ID:    Recurrent MSSA bacteremia with Tricuspid Valve infective endocarditis    Continue Ancef at least through 9/26 to complete a 6 week course from the first negative blood culture    OR cultures from 9/10 show no growth   Right iliacus muscle abscess/sacroiliitis    Imaging shows abscess is decreasing in size, Inflammatory markers continue to decrease, follow up ESR and CRP per IDs recommendations     8  Disposition:      Active IV heroin abuse - not a candidate for home antibiotic administration via PICC line   Will remain inpatient until IV antibiotic course is complete, likely 9/26    VTE Pharmacologic Prophylaxis: Warfarin (Coumadin)  VTE Mechanical Prophylaxis: sequential compression device    Collaborative rounds completed with SARAH Ferrer    Plan of care discussed with bedside nurse    SIGNATURE: Alee Holder PA-C  DATE: September 14, 2020  TIME: 1:22 PM

## 2020-09-14 NOTE — PROGRESS NOTES
Cardiology Progress Note - Lyudmila Taylor 34 y o  female MRN: [id number]    Unit/Bed#: [facility] 421-01 Encounter: [id number]        Subjective:    No significant events overnight  Major complaint is chest wall pain  Review of Systems   Cardiovascular: Negative for chest pain, leg swelling and palpitations  Respiratory: Negative for shortness of breath  Objective:   Vitals: Blood pressure 100/57, pulse 84, temperature 98 1 °F (36 7 °C), temperature source Oral, resp  rate 17, height 5' 5" (1 651 m), weight 72 kg (158 lb 11 7 oz), SpO2 95 %, not currently breastfeeding , Body mass index is 26 41 kg/m² ,   Orthostatic Blood Pressures      Most Recent Value   Blood Pressure  100/57 filed at 09/14/2020 1516   Patient Position - Orthostatic VS  Lying filed at 09/14/2020 0819         Systolic (56NWY), IUK:644 , Min:98 , GNX:737     Diastolic (68QCG), VEC:53, Min:54, Max:70      Intake/Output Summary (Last 24 hours) at 9/14/2020 1038  Last data filed at 9/14/2020 1001  Gross per 24 hour   Intake 68 23 ml   Output 2400 ml   Net -2331 77 ml     Weight (last 2 days)     Date/Time   Weight    09/14/20 0600   72 (158 73)    09/12/20 0600   71 4 (157 41)                  Telemetry Review: NSR    Physical Exam  Cardiovascular:      Rate and Rhythm: Normal rate and regular rhythm  Heart sounds: Normal heart sounds  No murmur  No friction rub  No gallop  Pulmonary:      Breath sounds: Normal breath sounds  No wheezing or rales             Laboratory Results:  Results from last 7 days   Lab Units 09/09/20  1350   CK TOTAL U/L 27       CBC with diff:   Results from last 7 days   Lab Units 09/14/20  0853 09/13/20  0417 09/12/20  1004 09/12/20  0921 09/11/20  0321 09/10/20  1838 09/10/20  1133  09/10/20  1005  09/09/20  0525   WBC Thousand/uL 5 42 6 10  --  6 08 7 18  --   --   --   --   --  5 84   HEMOGLOBIN g/dL 7 0* 7 4* 7 5* 6 4* 8 3* 9 1* 10 8*  --   --   --  10 4*   I STAT HEMOGLOBIN   --   --   --   -- --   --   --    < >  --    < >  --    HEMATOCRIT % 22 4* 24 7* 24 2* 20 8* 26 0* 28 2* 34 4*  --   --   --  33 7*   HEMATOCRIT, ISTAT   --   --   --   --   --   --   --    < >  --    < >  --    MCV fL 89 91  --  90 86  --   --   --   --   --  87   PLATELETS Thousands/uL 181 170  --  115* 154  --  269  --  277  --  304   MCH pg 27 7 27 1  --  27 6 27 4  --   --   --   --   --  26 9   MCHC g/dL 31 3* 30 0*  --  30 8* 31 9  --   --   --   --   --  30 9*   RDW % 14 3 14 8  --  15 1 14 8  --   --   --   --   --  14 6   MPV fL 9 1 9 0  --  9 0 8 4*  --  8 2*  --  8 5*  --  8 8*   NRBC AUTO /100 WBCs  --   --   --   --   --   --   --   --   --   --  0    < > = values in this interval not displayed           CMP:  Results from last 7 days   Lab Units 09/14/20  0438 09/14/20  0437 09/13/20  0417 09/12/20  0605 09/11/20  0321 09/10/20  1838 09/10/20  1457 09/10/20  1133 09/10/20  1131 09/10/20  1036 09/10/20  0956 09/10/20  0936 09/10/20  0932 09/10/20  0927 09/10/20  0858  09/10/20  0449 09/09/20  0525   POTASSIUM mmol/L 3 5  --  3 5 3 6 4 1 4 6 5 8* 4 0  --   --   --   --   --   --   --   --  4 1 4 0   CHLORIDE mmol/L 106  --  105 104 104  --   --  108  --   --   --   --   --   --   --   --  105 105   CO2 mmol/L 29  --  28 27 26  --   --  26  --   --   --   --   --   --   --   --  29 29   CO2, I-STAT mmol/L  --   --   --   --   --   --   --   --  25 27 27 26 30 26 25   < >  --   --    BUN mg/dL 8  --  7 8 13  --   --  14  --   --   --   --   --   --   --   --  17 15   CREATININE mg/dL 0 32*  --  0 33* 0 37* 0 36*  --   --  0 42*  --   --   --   --   --   --   --   --  0 47* 0 40*   GLUCOSE, ISTAT mg/dl  --   --   --   --   --   --   --   --  88 172* 220* 147* 140 128 124   < >  --   --    CALCIUM mg/dL 8 0*  --  8 2* 8 3 8 1*  --   --  8 1*  --   --   --   --   --   --   --   --  8 8 9 2   AST U/L  --  19  --   --   --   --   --   --   --   --   --   --   --   --   --   --  47* 46*   ALT U/L  --  14  --   --   --   --   -- --   --   --   --   --   --   --   --   --  28 30   ALK PHOS U/L  --  128*  --   --   --   --   --   --   --   --   --   --   --   --   --   --  174* 189*   EGFR ml/min/1 73sq m 154  --  153 147 148  --   --  141  --   --   --   --   --   --   --   --  136 143    < > = values in this interval not displayed  BMP:    Magnesium:   Results from last 7 days   Lab Units 20  0605 20  0321 20  0525   MAGNESIUM mg/dL 1 8 2 2 2 2       Coags:   Results from last 7 days   Lab Units 20  0438 20  0417 09/10/20  1838   PTT seconds  --   --  33   INR  1 56* 1 16 1 06       TSH:       Lipid Profile:   Results from last 7 days   Lab Units 20  1350   TRIGLYCERIDES mg/dL 266*   HDL mg/dL 45           Cardiac testing:   Results for orders placed during the hospital encounter of 07/15/20   Echo complete with contrast if indicated    Narrative [place] [id number], [address] [place] [address]  [phone]    Transthoracic Echocardiogram  2D, M-mode, Doppler, and Color Doppler    Study date:  2020    Patient: Meg Floyd  MR number: [id number]  Account number: [de-identified]  : 1990  Age: 34 years  Gender: Female  Status: Inpatient  Location: Bedside  Height: 65 in  Weight: 157 7 lb  BP: 109/ 58 mmHg    Indications: Rule out endocarditis  Diagnoses: I38  - Endocarditis, valve unspecified    Sonographer:  CANDY Wilks  Primary Physician:  Aruan Lora PA-C  Referring Physician:  Zuleyma Barnard PA-C  Group:  [facility]  Interpreting Physician:  Pauline Agustin MD    SUMMARY    LEFT VENTRICLE:  Systolic function was normal  Ejection fraction was estimated to be 65 %  There were no regional wall motion abnormalities  TRICUSPID VALVE:  There is an approximately 1cm atrial aspect mass on the free wall leaflet, best seen on image 39   There appears to be highly eccentric tricuspid regurgitation and may represent leaflet perforation  There was moderate regurgitation  RECOMMENDATIONS:  If clinically indicated, transesophageal echocardiography could provide additional information  HISTORY: PRIOR HISTORY: Sepsis, dyspnea, heroin abuse, Hep C, former smoker  PROCEDURE: The procedure was performed at the bedside  This was a routine study  The transthoracic approach was used  The study included complete 2D imaging, M-mode, complete spectral Doppler, and color Doppler  The heart rate was 52 bpm,  at the start of the study  Image quality was good  LEFT VENTRICLE: Size was normal  Systolic function was normal  Ejection fraction was estimated to be 65 %  There were no regional wall motion abnormalities  Wall thickness was normal  DOPPLER: Left ventricular diastolic function parameters  were normal     RIGHT VENTRICLE: The size was normal  Systolic function was normal  Wall thickness was normal     LEFT ATRIUM: Size was normal     RIGHT ATRIUM: Size was normal     MITRAL VALVE: Valve structure was normal  There was normal leaflet separation  DOPPLER: The transmitral velocity was within the normal range  There was no evidence for stenosis  There was no significant regurgitation  AORTIC VALVE: The valve was trileaflet  Leaflets exhibited normal thickness and normal cuspal separation  DOPPLER: Transaortic velocity was within the normal range  There was no evidence for stenosis  There was no significant  regurgitation  TRICUSPID VALVE: There is an approximately 1cm atrial aspect mass on the free wall leaflet, best seen on image 39  There appears to be highly eccentric tricuspid regurgitation and may represent leaflet perforation  The valve structure was  normal  There was normal leaflet separation  DOPPLER: The transtricuspid velocity was within the normal range  There was no evidence for stenosis  There was moderate regurgitation      PULMONIC VALVE: Leaflets exhibited normal thickness, no calcification, and normal cuspal separation  DOPPLER: The transpulmonic velocity was within the normal range  There was no significant regurgitation  PERICARDIUM: There was no pericardial effusion  The pericardium was normal in appearance  AORTA: The root exhibited normal size  SYSTEMIC VEINS: IVC: The inferior vena cava was normal in size  SYSTEM MEASUREMENT TABLES    2D  %FS: 41 1 %  Ao Diam: 2 86 cm  EDV(Teich): 81 52 ml  EF(Teich): 72 28 %  ESV(Teich): 22 6 ml  IVSd: 0 66 cm  LA Area: 20 97 cm2  LA Diam: 3 77 cm  LVEDV MOD A4C: 145 97 ml  LVEF MOD A4C: 59 16 %  LVESV MOD A4C: 59 62 ml  LVIDd: 4 27 cm  LVIDs: 2 51 cm  LVLd A4C: 8 93 cm  LVLs A4C: 7 27 cm  LVPWd: 0 62 cm  RA Area: 15 83 cm2  RVIDd: 3 79 cm  SV MOD A4C: 86 36 ml  SV(Teich): 58 92 ml    CW  AV Env  Ti: 325 26 ms  AV MaxPG: 10 7 mmHg  AV VTI: 34 98 cm  AV Vmax: 1 63 m/s  AV Vmean: 1 07 m/s  AV meanP 25 mmHg  TR MaxP 62 mmHg  TR Vmax: 2 27 m/s    MM  TAPSE: 2 76 cm    PW  E': 0 13 m/s  E/E': 8 22  LVOT Env  Ti: 327 57 ms  LVOT VTI: 23 27 cm  LVOT Vmax: 1 11 m/s  LVOT Vmean: 0 71 m/s  LVOT maxP 92 mmHg  LVOT meanP 36 mmHg  MV A Darrell: 0 47 m/s  MV Dec Nance: 4 59 m/s2  MV DecT: 232 16 ms  MV E Darrell: 1 06 m/s  MV E/A Ratio: 2 25  MV PHT: 67 33 ms  MVA By PHT: 3 27 cm2    [employer] Accredited Echocardiography Laboratory    Prepared and electronically signed by    Kalyn Mishra MD  Signed 2020 16:39:57       Results for orders placed during the hospital encounter of 07/15/20   MELISSA    Narrative [facility], [address] [facility]  [phone]    Transesophageal Echocardiogram  2D, Doppler, and Color Doppler    Study date:  2020    Patient: Kae Orozco  MR number: [id number]  Account number: [de-identified]  : 1990  Age: 34 years  Gender: Female  Status: Inpatient  Location: GI LAB  Height: 65 in  Weight: 158 lb  BP: 119/ 71 mmHg    Indications: 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 valve    Diagnoses: I36 9 - Nonrheumatic tricuspid valve disorder, unspecified, I38  - Endocarditis, valve unspecified    Sonographer:  AMANDA Barker, RDCS  Primary Physician:  Komal Jaimes PA-C  Referring Physician:  Elieser Sheldon MD  Group:  [facility]  RN:  Irene Pollard  Interpreting Physician:  Elieser Sheldon MD    SUMMARY    LEFT VENTRICLE:  Size was normal   Systolic function was normal  Ejection fraction was estimated to be 65 %  There were no regional wall motion abnormalities  Wall thickness was normal     RIGHT VENTRICLE:  The size was normal   Systolic function was normal     ATRIAL SEPTUM:  There was a small patent foramen ovale  Doppler evaluation was performed  Monia Le MITRAL VALVE:  There was trace regurgitation  TRICUSPID VALVE:  There was moderate to severe regurgitation  There was a medium-sized, papillary, mobile vegetation, measuring 11 mm x 10 mm on the tip of the anterior leaflet, on the right atrial aspect  HISTORY: PRIOR HISTORY: Heroine abuse; Pleural effusion; Sepsis; MSSA; Pulmonary emboli; Bacteremia; Abscess of left foot; Acute Pyelonephritis    PROCEDURE: The study was performed in the GI LAB  This was a routine study  The risks and alternatives of the procedure were explained to the patient and informed consent was obtained  The transesophageal approach was used  The study  included complete 2D imaging, complete spectral Doppler, and color Doppler  The heart rate was 80 bpm, at the start of the study  An adult omniplane probe was inserted by the attending cardiologist  Intubated with ease  One intubation  attempt(s)  There was no blood detected on the probe  Image quality was adequate  There were no complications during the procedure  MEDICATIONS: Benzocaine spray, topical to oropharynx, prior to procedure   Anesthesia administered by  anesthesia team     LEFT VENTRICLE: Size was normal  Systolic function was normal  Ejection fraction was estimated to be 65 %  There were no regional wall motion abnormalities  Wall thickness was normal     RIGHT VENTRICLE: The size was normal  Systolic function was normal  Wall thickness was normal     LEFT ATRIUM: Size was normal  No thrombus was identified  APPENDAGE: No thrombus was identified  ATRIAL SEPTUM: There was a small patent foramen ovale  Doppler evaluation was performed  Laura Prows RIGHT ATRIUM: Size was normal  No thrombus was identified  MITRAL VALVE: Valve structure was normal  There was normal leaflet separation  There was no echocardiographic evidence of vegetation  DOPPLER: There was trace regurgitation  AORTIC VALVE: The valve was trileaflet  Leaflets exhibited normal thickness and normal cuspal separation  There was no echocardiographic evidence of vegetation  DOPPLER: There was no regurgitation  TRICUSPID VALVE: The valve structure was normal  There was normal leaflet separation  There was malcoaptation of the valve leaflets  There was a medium-sized, papillary, mobile vegetation, measuring 11 mm x 10 mm on the tip of the anterior  leaflet, on the right atrial aspect  DOPPLER: There was moderate to severe regurgitation  The regurgitant jet was toward the septum  PULMONIC VALVE: Leaflets exhibited normal thickness, no calcification, and normal cuspal separation  There was no echocardiographic evidence of vegetation  PERICARDIUM: There was no pericardial effusion  The pericardium was normal in appearance  AORTA: The root exhibited normal size  There was no atheroma  There was no evidence for dissection  There was no evidence for aneurysm  [address] Accredited Echocardiography Laboratory    Prepared and electronically signed by    Yvette Lowery MD  Signed 21-Jul-2020 14:25:41       No results found for this or any previous visit  No results found for this or any previous visit      Meds/Allergies   Current Facility-Administered Medications Medication Dose Route Frequency Provider Last Rate    acetaminophen  975 mg Oral Q8H Alana Reyna PA-C      amiodarone  200 mg Oral Q8H [address] Alana Reyna PA-C      ascorbic acid  500 mg Oral Daily Hedy Thao PA-C      aspirin  81 mg Oral Daily Alana Reyna PA-C      bisacodyl  10 mg Rectal Daily PRN Alana Reyna PA-C      calcium acetate  667 mg Oral TID With Meals Alana Reyna PA-C      calcium carbonate  1,000 mg Oral Daily PRN Alana Reyna PA-C      cefazolin  2,000 mg Intravenous Q8H Wiliam Zeineddine, MD 2,000 mg (09/14/20 0851)    dicyclomine  10 mg Oral TID PRN Alana Reyna PA-C      docusate sodium  100 mg Oral BID Alana Reyna PA-C      DULoxetine  60 mg Oral Daily Alana Reyna PA-C      ferrous sulfate  325 mg Oral Daily With Breakfast Amara Browning PA-C      fondaparinux  2 5 mg Subcutaneous Q24H Amara Browning PA-C      furosemide  20 mg Intravenous Daily Amara Browning PA-C      glycopyrrolate  0 2 mg Intravenous Q4H PRN Alana Reyna PA-C      haloperidol lactate  2 mg Intramuscular Q30 Min PRN Alana Reyna PA-C      HYDROmorphone  2 mg Intravenous Q2H PRN Hedy Thao PA-C      insulin lispro  1-5 Units Subcutaneous TID AC Deisy Judd PA-C      iohexol  50 mL Oral 90 min pre-procedure Alana Reyna PA-C      ketamine  0 2 mg/kg/hr Intravenous Continuous Alana Reyna PA-C 0 2 mg/kg/hr (09/14/20 0424)    lidocaine  2 patch Topical Daily Alana Reyna PA-C      LORazepam  1 mg Intravenous Q2H PRN Alana Reyna PA-C      magnesium oxide  400 mg Oral BID Amara Browning PA-C      melatonin  6 mg Oral HS Amara Browning PA-C      methocarbamol  750 mg Oral Q6H [address] Amara Browning PA-C      metoprolol tartrate  25 mg Oral Q12H [address] Amara Browning PA-C      miconazole   Topical BID Alana Reyna PA-C      mirtazapine  30 mg Oral HS Amara Browning PA-C      mupirocin  1 application Nasal [address] Deisy Judd PA-C      OLANZapine  5 mg Oral HS Amara Browning PA-C      ondansetron  4 mg Intravenous Q6H PRN Olivier Mantachie, MILLIE      oxyCODONE  15 mg Oral Q4H PRN Olivier Mantachie, MILLIE      pantoprazole  40 mg Oral Daily Olivier Mantachie, MILLIE      polyethylene glycol  17 g Oral Daily Olivier Mantachie, MILLIE      potassium chloride  10 mEq Oral Daily Amara Browning PA-C      saccharomyces boulardii  250 mg Oral BID Olivier Mantachie, MILLIE      senna  1 tablet Oral BID Paz Felty Burkett, PA-C       ketamine, 0 2 mg/kg/hr, Last Rate: 0 2 mg/kg/hr (09/14/20 0424)      No medications prior to admission  Assessment:  Principal Problem:    Bacteremia due to Staphylococcus aureus  Active Problems:    Acute bacterial endocarditis    Septic embolism (HCC)    Bipolar 1 disorder (HCC)    Right hip pain    Intravenous drug abuse ([address] )    DVT of axillary vein, acute left (HCC)    Transaminitis    Rash    Anemia    Hyperphosphatemia    S/P TVR (tricuspid valve repair)      Impression:  1  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

## 2020-09-14 NOTE — PROGRESS NOTES
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:  Prolonged course of IV antibiotic was previously recommended, but patient has left AMA on 2 prior occasions (once at [facility] and once from [facility])    She remains hemodynamically stable   Repeat blood culture negative on 08/15/2020     -continue cefazolin at least through 9/26/2020 to complete 6 weeks from the 1st negative blood culture  -patient is postop day 4 for tricuspid valve surgery; operative culture negative  Would continue IV antibiotic treatment to finish 6 weeks total through 09/27/2020  -check CBC with diff and CMP weekly while on the IV antibiotics     2  Tricuspid valve endocarditis, 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:  MELISSA done 07/21/2020 showed large vegetation on TV with severe regurgitation  7/15 CT abdomen/pelvis also showed bilateral renal parenchymal abnormalities concerning for septic emboli, patient also had right-sided loculated pleural effusion   No intra-abdominal abscess   Repeat CT chest 09/04/2020 shows resolution of right-sided pleural effusion  Patient is status post repair of tricuspid valve with annuloplasty on 09/10/2020; sternal wound shows no dehiscence, no drainage or erythema  -antibiotic plan as above  -close CT surgery follow-up     3   Right iliacus muscle abscesses/sacroiliitis:  CT scan of right lower extremity on 08/21/2020 showed 9 mm iliacus muscle collection, decreasing in size   Follow-up imaging with MRI done 08/31/2020 with evidence of sacroiliitis that is probably septic   There are no destructive changes or fluid collection   Suspect this is the cause of the patient's ongoing pain   Inflammatory markers continue to decrease, ESR now normal; CRP up to 113, though this is possibly secondary to her recent sternotomy and valvular surgery  -antibiotics as above  -will repeat ESR and CRP 09/21/2020;  if inflammatory markers have not returned to normal before end of IV antibiotic treatment, would consider additional course of p o  Antibiotics   -pain management        4  Back pain:   Slightly improving, more likely secondary to chronic pain, opioid dependence   8/1/20 Thoracic and lumbar spine MRIs performed at [facility] [place] [facility] negative were for abscess or osteomyelitis   CT scan nondiagnostic for osteomyelitis or diskitis   Sedimentation rate and CRP continue to decrease as mentioned above    -monitor back pain   -serial exams  -acute pain service follow-up  -recheck CRP and ESR 30 next week as mentioned above     5  Active IV heroin abuse:   This is the causative factor for development of bacteremia and infective endocarditis   Patient has left AMA on prior occasions   HIV screen negative  -patient is not a candidate for at home PICC line/IV antibiotics  -acute pain service follow-up     6   Chronic HCV infection:   Recent HIV was negative   The LFTs are waxing waning  Review of Care everywhere shows high hepatitis C viral load 01/2020   -monitor LFTs   - outpatient follow-up with GI     7  Left upper extremity DVT:  in the setting of PICC line use   Patient now on anticoagulation  -vascular follow-up  -serial left upper extremity exam; if symptoms of increased pain, swelling, might consider need to remove left upper extremity PICC line  -anticoagulation     8- mild eosinophilia:  Patient denies itching, rash or other potential side effects from antibiotic use  Creatinine is stable  - continue cefazolin  -repeat CBC with differential in a m      Discussed the above management plan with the primary service  Plan mentioned above discussed with the patient     Antibiotics:  Cefazolin restart 36  Negative blood cultures 28  Postop day 4       Subjective:  Patient has no fever, chills, sweats; no nausea, vomiting, diarrhea; no cough, shortness of breath; today she denies pain over the hip or the back area, but this complaint sternal wound pain    Objective:  Vitals:  Temp:  [97 5 °F (36 4 °C)-99 1 °F (37 3 °C)] 98 1 °F (36 7 °C)  HR:  [] 84  Resp:  [17-18] 17  BP: ()/(54-70) 100/57  SpO2:  [94 %-100 %] 95 %  Temp (24hrs), Av 2 °F (36 8 °C), Min:97 5 °F (36 4 °C), Max:99 1 °F (37 3 °C)  Current: Temperature: 98 1 °F (36 7 °C)    Physical Exam:   General Appearance:  Alert, interactive, nontoxic, no acute distress  Throat: Oropharynx moist without lesions  Lungs:   Clear to auscultation bilaterally; no wheezes, rhonchi or rales; respirations unlabored   Heart:  RRR; no murmur, rub or gallop   Abdomen:   Soft, non-tender, non-distended, positive bowel sounds  Extremities: No clubbing, cyanosis or edema   Skin: No new rashes or lesions    Sternal wound healing well, with no dehiscence, erythema, drainage       Labs, Imaging, & Other studies:   All pertinent labs and imaging studies were personally reviewed  Results from last 7 days   Lab Units 20  0853 20  0417 20  1004 20  0921   WBC Thousand/uL 5 42 6 10  --  6 08   HEMOGLOBIN g/dL 7 0* 7 4* 7 5* 6 4*   PLATELETS Thousands/uL 181 170  --  115*     Results from last 7 days   Lab Units 20  0438 20  0437 20  0417 20  0605  09/10/20  1131  09/10/20  0449 20  0525   SODIUM mmol/L 141  --  140 138   < >  --   --  138 140   POTASSIUM mmol/L 3 5  --  3 5 3 6   < >  --   --  4 1 4 0   CHLORIDE mmol/L 106  --  105 104   < >  --   --  105 105   CO2 mmol/L 29  --  28 27   < >  --   --  29 29   CO2, I-STAT mmol/L  --   --   --   --   --  25   < >  --   --    BUN mg/dL 8  --  7 8   < >  --   --  17 15   CREATININE mg/dL 0 32*  --  0 33* 0 37*   < >  --   --  0 47* 0 40*   EGFR ml/min/1 73sq m 154  --  153 147   < >  --   --  136 143   GLUCOSE, ISTAT mg/dl  --   --   --   --   --  88   < >  --   --    CALCIUM mg/dL 8 0*  --  8 2* 8 3   < >  -- --  8 8 9 2   AST U/L  --  19  --   --   --   --   --  47* 46*   ALT U/L  --  14  --   --   --   --   --  28 30   ALK PHOS U/L  --  128*  --   --   --   --   --  174* 189*    < > = values in this interval not displayed       Results from last 7 days   Lab Units 09/10/20  0922   GRAM STAIN RESULT  2+ Polys  No organisms seen         Results from last 7 days   Lab Units 09/14/20  0437   CRP mg/L 113 0*     Results from last 7 days   Lab Units 09/10/20  0449   FERRITIN ng/mL 71

## 2020-09-14 NOTE — UTILIZATION REVIEW
Continued Stay Review    Date: 09/12/2020                        Severe Tricuspid Regurgitation, Tricuspid Valve Endocarditis  S/P tricuspid valve repair; POD # 2  Current Patient Class: Inpatient  Current Level of Care: level 2 stepdown    HPI:29 y o  female initially admitted on 08/12/2020     Assessment/Plan:   Continue ABx  Chest Tubes #1,2,3  Epicardial pacing wires A/V  Triple CVC line  Coumadin for DVT  Active IV heroin abuse - not a candidate for home antibiotic administration via PICC line  Will remain inpatient until IV antibiotic course is complete, likely 9/26  VTE Pharmacologic Prophylaxis: Warfarin (Coumadin)  VTE Mechanical Prophylaxis: sequential compression device  Pertinent Labs/Diagnostic Results:     Results from last 7 days   Lab Units 09/14/20  0853 09/13/20  0417 09/12/20  1004 09/12/20  0921 09/11/20  0321  09/09/20  0525   WBC Thousand/uL 5 42 6 10  --  6 08 7 18  --  5 84   HEMOGLOBIN g/dL 7 0* 7 4* 7 5* 6 4* 8 3*   < > 10 4*   I STAT HEMOGLOBIN   --   --   --   --   --    < >  --    HEMATOCRIT % 22 4* 24 7* 24 2* 20 8* 26 0*   < > 33 7*   HEMATOCRIT, ISTAT   --   --   --   --   --    < >  --    PLATELETS Thousands/uL 181 170  --  115* 154   < > 304   NEUTROS ABS Thousands/µL  --   --   --   --   --   --  2 43    < > = values in this interval not displayed       Results from last 7 days   Lab Units 09/14/20  0855 09/14/20  0438 09/14/20  0437 09/13/20  0417 09/12/20  0605 09/11/20  0410 09/11/20  0321 09/10/20  1838  09/10/20  1133 09/10/20  1131 09/10/20  1036 09/10/20  0956 09/10/20  0936 09/10/20  0932  09/09/20  0525   SODIUM mmol/L  --  141  --  140 138  --  136  --   --  142  --   --   --   --   --    < > 140   POTASSIUM mmol/L  --  3 5  --  3 5 3 6  --  4 1 4 6   < > 4 0  --   --   --   --   --    < > 4 0   CHLORIDE mmol/L  --  106  --  105 104  --  104  --   --  108  --   --   --   --   --    < > 105   CO2 mmol/L  --  29  --  28 27  --  26  --   --  26  --   --   --   --   -- < > 29   CO2, I-STAT mmol/L  --   --   --   --   --   --   --   --   --   --  25 27 27 26 30   < >  --    ANION GAP mmol/L  --  6  --  7 7  --  6  --   --  8  --   --   --   --   --    < > 6   BUN mg/dL  --  8  --  7 8  --  13  --   --  14  --   --   --   --   --    < > 15   CREATININE mg/dL  --  0 32*  --  0 33* 0 37*  --  0 36*  --   --  0 42*  --   --   --   --   --    < > 0 40*   EGFR ml/min/1 73sq m  --  154  --  153 147  --  148  --   --  141  --   --   --   --   --    < > 143   CALCIUM mg/dL  --  8 0*  --  8 2* 8 3  --  8 1*  --   --  8 1*  --   --   --   --   --    < > 9 2   CALCIUM, IONIZED, ISTAT mmol/L  --   --   --   --   --   --   --   --   --   --  1 18 1 19 1 11* 1 09* 1 09*   < >  --    MAGNESIUM mg/dL  --   --   --   --  1 8  --  2 2  --   --   --   --   --   --   --   --   --  2 2   PHOSPHORUS mg/dL 5 7*  --  4 9*  --  3 4 4 9*  --  5 2*  --   --   --   --   --   --   --    < > 6 4*    < > = values in this interval not displayed       Results from last 7 days   Lab Units 09/14/20  0437 09/10/20  0449 09/09/20  1350 09/09/20  0525   AST U/L 19 47*  --  46*   ALT U/L 14 28  --  30   ALK PHOS U/L 128* 174*  --  189*   TOTAL PROTEIN g/dL 6 1* 7 1 7 3 7 2   ALBUMIN g/dL 2 4* 2 8*  --  3 0*   TOTAL BILIRUBIN mg/dL 0 21 0 20  --  0 22   BILIRUBIN DIRECT mg/dL 0 09  --   --   --      Results from last 7 days   Lab Units 09/14/20  1557 09/14/20  1126 09/14/20  0609 09/13/20  2100 09/13/20  1520 09/13/20  1117 09/13/20  0654 09/12/20  2047 09/12/20  1634 09/12/20  1112 09/12/20  0551 09/11/20  2037   POC GLUCOSE mg/dl 101 120 113 100 109 90 100 103 109 113 123 138     Results from last 7 days   Lab Units 09/14/20  0438 09/13/20  0417 09/12/20  0605 09/11/20  0321 09/10/20  1133 09/10/20  0449 09/09/20  0525   GLUCOSE RANDOM mg/dL 98 102 122 98 84 95 90     Results from last 7 days   Lab Units 09/10/20  1131 09/10/20  1036 09/10/20  0956 09/10/20  0936 09/10/20  0932  09/10/20  0757   PH, RACHELL I-STAT   -- --   --  7 260* 7 140*  --  7 319   PCO2, RACHELL ISTAT mm HG  --   --   --  55 0* 80 8*  --  52 0*   PO2, RACHELL ISTAT mm HG  --   --   --  38 0 45 0  --  49 0*   HCO3, RACHELL ISTAT mmol/L  --   --   --  24 7 27 5  --  26 7   I STAT BASE EXC mmol/L -2 -1 0 -2 -2   < > 0   I STAT O2 SAT % 97* 100* 100* 64 65   < > 81   ISTAT PH ART  7 349* 7 338* 7 334*  --   --    < >  --    I STAT ART PCO2 mm HG 43 1 46 8* 48 6*  --   --    < >  --    I STAT ART PO2 mm HG 95 0 248 0* 323 0*  --   --    < >  --    I STAT ART HCO3 mmol/L 23 7 25 1 25 8  --   --    < >  --     < > = values in this interval not displayed       Results from last 7 days   Lab Units 09/09/20  1350   CK TOTAL U/L 27     Results from last 7 days   Lab Units 09/14/20  0438 09/13/20  0417 09/10/20  1838   PROTIME seconds 18 7* 14 9* 13 8   INR  1 56* 1 16 1 06   PTT seconds  --   --  33     Results from last 7 days   Lab Units 09/10/20  0449   TSH 3RD GENERATON uIU/mL 7 620*     Results from last 7 days   Lab Units 09/10/20  0449   FERRITIN ng/mL 71     Results from last 7 days   Lab Units 09/14/20  0437   CRP mg/L 113 0*     Results from last 7 days   Lab Units 09/10/20  0922   GRAM STAIN RESULT  2+ Polys  No organisms seen     Vital Signs:   Date/Time   Temp   Pulse   Resp   BP   MAP (mmHg)   SpO2   Calculated FIO2 (%) - Nasal Cannula   Nasal Cannula O2 Flow Rate (L/min)   O2 Device   Patient Position - Orthostatic VS    09/13/20 0243      92   20   107/57   75   96 %         None (Room air)   Lying    09/12/20 2319      91   20   103/55   72   95 %         None (Room air)   Lying    09/12/20 1922   98 2 °F (36 8 °C)   125Abnormal     18   106/57   77   95 %         None (Room air)   Lying    09/12/20 1541   98 1 °F (36 7 °C)   104   18   108/55   75   93 %         None (Room air)   Lying    09/12/20 1345            99/56   74                   09/12/20 1330            100/59   69                   09/12/20 1315            105/54   76                 09/12/20 1300            120/64   80                   09/12/20 1245            119/59   85                   09/12/20 1151   98 3 °F (36 8 °C)   100   17   117/60   80   94 %         None (Room air)   Lying    09/12/20 0727   97 5 °F (36 4 °C)   122Abnormal     16   121/74   89   95 %         None (Room air)   Sitting    09/12/20 0244   97 8 °F (36 6 °C)   118Abnormal     15   152/85   112   99 %   32   3 L/min   Nasal cannula   Lying        Medications:   Scheduled Medications:  acetaminophen, 975 mg, Oral, Q8H  amiodarone, 200 mg, Oral, Q8H JEFF  ascorbic acid, 500 mg, Oral, Daily  aspirin, 81 mg, Oral, Daily  calcium acetate, 667 mg, Oral, TID With Meals  cefazolin, 2,000 mg, Intravenous, Q8H  docusate sodium, 100 mg, Oral, BID  DULoxetine, 60 mg, Oral, Daily  ferrous sulfate, 325 mg, Oral, Daily With Breakfast  fondaparinux, 2 5 mg, Subcutaneous, Q24H  furosemide, 20 mg, Intravenous, Daily  insulin lispro, 1-5 Units, Subcutaneous, TID AC  iohexol, 50 mL, Oral, 90 min pre-procedure  lidocaine, 2 patch, Topical, Daily  magnesium oxide, 400 mg, Oral, BID  melatonin, 6 mg, Oral, HS  methocarbamol, 750 mg, Oral, Q6H JEFF  metoprolol tartrate, 25 mg, Oral, Q12H JEFF  miconazole, , Topical, BID  mirtazapine, 30 mg, Oral, HS  mupirocin, 1 application, Nasal, N49E JEFF  OLANZapine, 5 mg, Oral, HS  pantoprazole, 40 mg, Oral, Daily  polyethylene glycol, 17 g, Oral, Daily  potassium chloride, 10 mEq, Oral, Daily  saccharomyces boulardii, 250 mg, Oral, BID  senna, 1 tablet, Oral, BID  warfarin, 5 mg, Oral, Once (warfarin)      Continuous IV Infusions:  ketamine, 0 1 mg/kg/hr, Intravenous, Continuous      PRN Meds:  bisacodyl, 10 mg, Rectal, Daily PRN  calcium carbonate, 1,000 mg, Oral, Daily PRN  dicyclomine, 10 mg, Oral, TID PRN  glycopyrrolate, 0 2 mg, Intravenous, Q4H PRN  haloperidol lactate, 2 mg, Intramuscular, Q30 Min PRN  HYDROmorphone, 2 mg, Intravenous, Q2H PRN  LORazepam, 1 5 mg, Intravenous, Q2H PRN  ondansetron, 4 mg, Intravenous, Q6H PRN  oxyCODONE, 15 mg, Oral, Q4H PRN        Discharge Plan: TBD    Network Utilization Review Department  [email or web address]  ATTENTION: Please call with any questions or concerns to [phone] and carefully listen to the prompts so that you are directed to the right person  All voicemails are confidential   Sammie Mccloud all requests for admission clinical reviews, approved or denied determinations and any other requests to dedicated fax number below belonging to the campus where the patient is receiving treatment   List of dedicated fax numbers for the Facilities:  [address] DENIALS (Administrative/Medical Necessity) [phone]   [address] (Maternity/NICU/Pediatrics) [phone]   Mirian Larry [phone]   Dank Willoughby [phone]   Michael Kruse [phone]   Familia Benavidez [phone]   [address] [phone]   [facility]  [phone]   [address] [facility]  [address] [facility] [address] [facility] [phone]

## 2020-09-15 LAB
GLUCOSE SERPL-MCNC: 104 MG/DL (ref 65–140)
GLUCOSE SERPL-MCNC: 118 MG/DL (ref 65–140)
GLUCOSE SERPL-MCNC: 182 MG/DL (ref 65–140)
GLUCOSE SERPL-MCNC: 97 MG/DL (ref 65–140)
INR PPP: 2.71 (ref 0.84–1.19)
PHOSPHATE SERPL-MCNC: 4.9 MG/DL (ref 2.7–4.5)
PROTHROMBIN TIME: 28.6 SECONDS (ref 11.6–14.5)

## 2020-09-15 PROCEDURE — 99232 SBSQ HOSP IP/OBS MODERATE 35: CPT | Performed by: PHYSICIAN ASSISTANT

## 2020-09-15 PROCEDURE — 99024 POSTOP FOLLOW-UP VISIT: CPT | Performed by: PHYSICIAN ASSISTANT

## 2020-09-15 PROCEDURE — 85610 PROTHROMBIN TIME: CPT | Performed by: PHYSICIAN ASSISTANT

## 2020-09-15 PROCEDURE — 82948 REAGENT STRIP/BLOOD GLUCOSE: CPT

## 2020-09-15 PROCEDURE — 99232 SBSQ HOSP IP/OBS MODERATE 35: CPT | Performed by: INTERNAL MEDICINE

## 2020-09-15 PROCEDURE — 84100 ASSAY OF PHOSPHORUS: CPT | Performed by: STUDENT IN AN ORGANIZED HEALTH CARE EDUCATION/TRAINING PROGRAM

## 2020-09-15 PROCEDURE — 97530 THERAPEUTIC ACTIVITIES: CPT

## 2020-09-15 PROCEDURE — 99233 SBSQ HOSP IP/OBS HIGH 50: CPT | Performed by: INTERNAL MEDICINE

## 2020-09-15 RX ORDER — WARFARIN SODIUM 5 MG/1
5 TABLET ORAL
Status: DISCONTINUED | OUTPATIENT
Start: 2020-09-15 | End: 2020-09-15

## 2020-09-15 RX ORDER — WARFARIN SODIUM 1 MG/1
3 TABLET ORAL
Status: DISCONTINUED | OUTPATIENT
Start: 2020-09-15 | End: 2020-09-15

## 2020-09-15 RX ORDER — HYDROMORPHONE HCL/PF 1 MG/ML
2 SYRINGE (ML) INJECTION EVERY 4 HOURS PRN
Status: DISCONTINUED | OUTPATIENT
Start: 2020-09-15 | End: 2020-09-18

## 2020-09-15 RX ORDER — WARFARIN SODIUM 1 MG/1
1 TABLET ORAL
Status: COMPLETED | OUTPATIENT
Start: 2020-09-15 | End: 2020-09-15

## 2020-09-15 RX ADMIN — METHOCARBAMOL TABLETS 750 MG: 750 TABLET, COATED ORAL at 17:49

## 2020-09-15 RX ADMIN — AMIODARONE HYDROCHLORIDE 200 MG: 200 TABLET ORAL at 15:17

## 2020-09-15 RX ADMIN — LORAZEPAM 1.5 MG: 2 INJECTION INTRAMUSCULAR; INTRAVENOUS at 05:10

## 2020-09-15 RX ADMIN — OXYCODONE HYDROCHLORIDE AND ACETAMINOPHEN 500 MG: 500 TABLET ORAL at 08:31

## 2020-09-15 RX ADMIN — POLYETHYLENE GLYCOL 3350 17 G: 17 POWDER, FOR SOLUTION ORAL at 08:32

## 2020-09-15 RX ADMIN — OXYCODONE HYDROCHLORIDE 15 MG: 10 TABLET ORAL at 03:55

## 2020-09-15 RX ADMIN — ACETAMINOPHEN 975 MG: 325 TABLET, FILM COATED ORAL at 20:07

## 2020-09-15 RX ADMIN — ACETAMINOPHEN 975 MG: 325 TABLET, FILM COATED ORAL at 03:59

## 2020-09-15 RX ADMIN — FERROUS SULFATE TAB 325 MG (65 MG ELEMENTAL FE) 325 MG: 325 (65 FE) TAB at 08:32

## 2020-09-15 RX ADMIN — WARFARIN SODIUM 1 MG: 1 TABLET ORAL at 17:50

## 2020-09-15 RX ADMIN — HYDROMORPHONE HYDROCHLORIDE 2 MG: 1 INJECTION, SOLUTION INTRAMUSCULAR; INTRAVENOUS; SUBCUTANEOUS at 09:41

## 2020-09-15 RX ADMIN — DOCUSATE SODIUM 100 MG: 100 CAPSULE, LIQUID FILLED ORAL at 08:31

## 2020-09-15 RX ADMIN — SENNOSIDES 8.6 MG: 8.6 TABLET ORAL at 17:49

## 2020-09-15 RX ADMIN — DOCUSATE SODIUM 100 MG: 100 CAPSULE, LIQUID FILLED ORAL at 17:49

## 2020-09-15 RX ADMIN — POTASSIUM CHLORIDE 10 MEQ: 750 TABLET, EXTENDED RELEASE ORAL at 08:32

## 2020-09-15 RX ADMIN — FONDAPARINUX SODIUM 2.5 MG: 2.5 INJECTION, SOLUTION SUBCUTANEOUS at 11:52

## 2020-09-15 RX ADMIN — OXYCODONE HYDROCHLORIDE 15 MG: 10 TABLET ORAL at 15:16

## 2020-09-15 RX ADMIN — OXYCODONE HYDROCHLORIDE 15 MG: 10 TABLET ORAL at 20:07

## 2020-09-15 RX ADMIN — AMIODARONE HYDROCHLORIDE 200 MG: 200 TABLET ORAL at 22:04

## 2020-09-15 RX ADMIN — CEFAZOLIN SODIUM 2000 MG: 2 SOLUTION INTRAVENOUS at 15:45

## 2020-09-15 RX ADMIN — Medication 250 MG: at 17:49

## 2020-09-15 RX ADMIN — HYDROMORPHONE HYDROCHLORIDE 2 MG: 1 INJECTION, SOLUTION INTRAMUSCULAR; INTRAVENOUS; SUBCUTANEOUS at 02:03

## 2020-09-15 RX ADMIN — SENNOSIDES 8.6 MG: 8.6 TABLET ORAL at 08:32

## 2020-09-15 RX ADMIN — Medication 0.1 MG/KG/HR: at 11:53

## 2020-09-15 RX ADMIN — INSULIN LISPRO 1 UNITS: 100 INJECTION, SOLUTION INTRAVENOUS; SUBCUTANEOUS at 12:09

## 2020-09-15 RX ADMIN — LORAZEPAM 1.5 MG: 2 INJECTION INTRAMUSCULAR; INTRAVENOUS at 00:56

## 2020-09-15 RX ADMIN — PANTOPRAZOLE SODIUM 40 MG: 40 TABLET, DELAYED RELEASE ORAL at 08:35

## 2020-09-15 RX ADMIN — ASPIRIN 81 MG CHEWABLE TABLET 81 MG: 81 TABLET CHEWABLE at 08:31

## 2020-09-15 RX ADMIN — FUROSEMIDE 20 MG: 10 INJECTION, SOLUTION INTRAMUSCULAR; INTRAVENOUS at 08:32

## 2020-09-15 RX ADMIN — CEFAZOLIN SODIUM 2000 MG: 2 SOLUTION INTRAVENOUS at 08:32

## 2020-09-15 RX ADMIN — METHOCARBAMOL TABLETS 750 MG: 750 TABLET, COATED ORAL at 05:03

## 2020-09-15 RX ADMIN — HYDROMORPHONE HYDROCHLORIDE 2 MG: 1 INJECTION, SOLUTION INTRAMUSCULAR; INTRAVENOUS; SUBCUTANEOUS at 11:56

## 2020-09-15 RX ADMIN — CALCIUM ACETATE 667 MG: 667 CAPSULE ORAL at 11:52

## 2020-09-15 RX ADMIN — AMIODARONE HYDROCHLORIDE 200 MG: 200 TABLET ORAL at 05:03

## 2020-09-15 RX ADMIN — OXYCODONE HYDROCHLORIDE 15 MG: 10 TABLET ORAL at 08:32

## 2020-09-15 RX ADMIN — OLANZAPINE 5 MG: 5 TABLET, FILM COATED ORAL at 20:09

## 2020-09-15 RX ADMIN — CALCIUM ACETATE 667 MG: 667 CAPSULE ORAL at 15:44

## 2020-09-15 RX ADMIN — LORAZEPAM 1.5 MG: 2 INJECTION INTRAMUSCULAR; INTRAVENOUS at 10:50

## 2020-09-15 RX ADMIN — HYDROMORPHONE HYDROCHLORIDE 2 MG: 1 INJECTION, SOLUTION INTRAMUSCULAR; INTRAVENOUS; SUBCUTANEOUS at 17:51

## 2020-09-15 RX ADMIN — Medication 250 MG: at 08:31

## 2020-09-15 RX ADMIN — MIRTAZAPINE 30 MG: 30 TABLET, FILM COATED ORAL at 20:08

## 2020-09-15 RX ADMIN — MAGNESIUM OXIDE TAB 400 MG (241.3 MG ELEMENTAL MG) 400 MG: 400 (241.3 MG) TAB at 08:32

## 2020-09-15 RX ADMIN — HYDROMORPHONE HYDROCHLORIDE 2 MG: 1 INJECTION, SOLUTION INTRAMUSCULAR; INTRAVENOUS; SUBCUTANEOUS at 04:03

## 2020-09-15 RX ADMIN — MELATONIN 6 MG: at 20:07

## 2020-09-15 RX ADMIN — METHOCARBAMOL TABLETS 750 MG: 750 TABLET, COATED ORAL at 11:52

## 2020-09-15 RX ADMIN — HYDROMORPHONE HYDROCHLORIDE 2 MG: 1 INJECTION, SOLUTION INTRAMUSCULAR; INTRAVENOUS; SUBCUTANEOUS at 22:04

## 2020-09-15 RX ADMIN — CALCIUM ACETATE 667 MG: 667 CAPSULE ORAL at 08:32

## 2020-09-15 RX ADMIN — DULOXETINE HYDROCHLORIDE 60 MG: 60 CAPSULE, DELAYED RELEASE ORAL at 08:32

## 2020-09-15 RX ADMIN — MAGNESIUM OXIDE TAB 400 MG (241.3 MG ELEMENTAL MG) 400 MG: 400 (241.3 MG) TAB at 17:49

## 2020-09-15 RX ADMIN — HYDROMORPHONE HYDROCHLORIDE 2 MG: 1 INJECTION, SOLUTION INTRAMUSCULAR; INTRAVENOUS; SUBCUTANEOUS at 06:26

## 2020-09-15 RX ADMIN — ACETAMINOPHEN 975 MG: 325 TABLET, FILM COATED ORAL at 10:50

## 2020-09-15 NOTE — PROGRESS NOTES
Progress Note - Cardiothoracic Surgery   Tristen Huerta 34 y o  female MRN: [id number]  Unit/Bed#: [facility] 421-01 Encounter: [id number]  Severe Tricuspid Regurgitation, Tricuspid Valve Endocarditis  S/P tricuspid valve repair; POD # 5    24 Hour Events: No complaints this morning  No events this morning       Medications:   Scheduled Meds:  Current Facility-Administered Medications   Medication Dose Route Frequency Provider Last Rate    acetaminophen  975 mg Oral Q8H Juliet Hines PA-C      amiodarone  200 mg Oral Q8H [address] Juliet Hines PA-C      ascorbic acid  500 mg Oral Daily Queta Smith PA-C      aspirin  81 mg Oral Daily Juliet Hines PA-C      bisacodyl  10 mg Rectal Daily PRN Juliet Hines PA-C      calcium acetate  667 mg Oral TID With Meals Juliet Hines PA-C      calcium carbonate  1,000 mg Oral Daily PRN Juliet Hines PA-C      cefazolin  2,000 mg Intravenous Q8H Wiliam MD Henri 2,000 mg (09/15/20 6410)    dicyclomine  10 mg Oral TID PRN Juliet Hines PA-C      docusate sodium  100 mg Oral BID Juliet Hines PA-C      DULoxetine  60 mg Oral Daily Juliet Hines PA-C      ferrous sulfate  325 mg Oral Daily With Breakfast Amara Browning PA-C      fondaparinux  2 5 mg Subcutaneous Q24H Amara Browning PA-C      furosemide  20 mg Intravenous Daily Amara Browning PA-C      glycopyrrolate  0 2 mg Intravenous Q4H PRN Juliet Hines PA-C      haloperidol lactate  2 mg Intramuscular Q30 Min PRN Juliet Hines PA-C      HYDROmorphone  2 mg Intravenous Q2H PRN Queta Smith PA-C      insulin lispro  1-5 Units Subcutaneous TID AC Deisy Judd PA-C      iohexol  50 mL Oral 90 min pre-procedure Juliet Hines PA-C      ketamine  0 1 mg/kg/hr Intravenous Continuous Riley MILLIE Brown 0 1 mg/kg/hr (09/15/20 1153)    lidocaine  2 patch Topical Daily Diesy A Dutch, PA-C      LORazepam  1 5 mg Intravenous Q2H PRN Fior Felisha Munroe, MILLIE      magnesium oxide  400 mg Oral BID Hedy Ditch, MILLIE      melatonin  6 mg Oral HS Hedy Ditch, MILLIE      methocarbamol  750 mg Oral Q6H [address] Amara Browning, MILLIE      metoprolol tartrate  25 mg Oral Q12H [address] Amara Browning, MILLIE      miconazole   Topical BID Alana Retort, MILLIE      mirtazapine  30 mg Oral HS Amara PARR [place], MILLIE      OLANZapine  5 mg Oral HS Amara Browning, MILLIE      ondansetron  4 mg Intravenous Q6H PRN Alana Retort, MILLIE      oxyCODONE  15 mg Oral Q4H PRN Alana Retort, MILLIE      pantoprazole  40 mg Oral Daily Alana Retort, MILLIE      polyethylene glycol  17 g Oral Daily Alana Retort, MILLIE      potassium chloride  10 mEq Oral Daily Amara Browning PA-C      saccharomyces boulardii  250 mg Oral BID Alana Retort, MILLIE      senna  1 tablet Oral BID Alana Retort, MILLIE       Continuous Infusions:ketamine, 0 1 mg/kg/hr, Last Rate: 0 1 mg/kg/hr (09/15/20 1153)      PRN Meds: bisacodyl    calcium carbonate    dicyclomine    glycopyrrolate    haloperidol lactate    HYDROmorphone    LORazepam    ondansetron    oxyCODONE    Vitals:   Vitals:    09/14/20 1553 09/14/20 2006 09/14/20 2234 09/15/20 0757   BP: 110/63 115/59 109/68 (!) 99/48   BP Location: Right arm  Left arm Left arm   Pulse: 87 95 85 78   Resp: 16  18 18   Temp: 98 1 °F (36 7 °C)  98 2 °F (36 8 °C) 97 7 °F (36 5 °C)   TempSrc: Oral  Oral Axillary   SpO2: 99%  92% 97%   Weight:       Height:           Telemetry: NSR; Heart Rate: 85    Respiratory:   SpO2: SpO2: 97 %; Room Air    Intake/Output:   I/O       09/13 0701 - 09/14 0700 09/14 0701 - 09/15 0700 09/15 0701 - 09/16 0700    P  O  400 200 120    I V  (mL/kg) 68 2 (0 9) 464 6 (6 5) 21 6 (0 3)    IV Piggyback       Total Intake(mL/kg) 468 2 (6 5) 664 6 (9 2) 141 6 (2)    Urine (mL/kg/hr) 2800 (1 6) 800 (0 5)     Stool  0     Chest Tube       Total Output 2800 800     Net -2331 8 -135 4 +141 6 Unmeasured Urine Occurrence 1 x 4 x     Unmeasured Stool Occurrence  3 x         Weights:   Weight (last 2 days)     Date/Time   Weight    09/14/20 0600   72 (158 73)            Results:   Results from last 7 days   Lab Units 09/14/20  0853 09/13/20  0417 09/12/20  1004 09/12/20  0921   WBC Thousand/uL 5 42 6 10  --  6 08   HEMOGLOBIN g/dL 7 0* 7 4* 7 5* 6 4*   HEMATOCRIT % 22 4* 24 7* 24 2* 20 8*   PLATELETS Thousands/uL 181 170  --  115*     Results from last 7 days   Lab Units 09/14/20  0438 09/13/20  0417 09/12/20  0605  09/10/20  1131   SODIUM mmol/L 141 140 138   < >  --    POTASSIUM mmol/L 3 5 3 5 3 6   < >  --    CHLORIDE mmol/L 106 105 104   < >  --    CO2 mmol/L 29 28 27   < >  --    CO2, I-STAT mmol/L  --   --   --   --  25   BUN mg/dL 8 7 8   < >  --    CREATININE mg/dL 0 32* 0 33* 0 37*   < >  --    GLUCOSE, ISTAT mg/dl  --   --   --   --  88   CALCIUM mg/dL 8 0* 8 2* 8 3   < >  --     < > = values in this interval not displayed  Results from last 7 days   Lab Units 09/14/20  0438 09/13/20  0417 09/10/20  1838   INR  1 56* 1 16 1 06   PTT seconds  --   --  33     Point of care glucose: normal    Invasive Lines/Tubes:  Invasive Devices     Peripherally Inserted Central Catheter Line            PICC Line 08/26/20 19 days                Physical Exam:    HEENT/NECK:  PERRLA  No jugular venous distention  Cardiac: Regular rate and rhythm  Pulmonary:  Breath sounds clear bilaterally  Abdomen:  Non-tender and Non-distended  Incisions: Sternum is stable  Incision is clean, dry, and intact  Extremities: Extremities warm/dry  Neuro: Alert and oriented X 3  Skin: Warm/Dry, without rashes or lesions      Assessment:  Principal Problem:    Bacteremia due to Staphylococcus aureus  Active Problems:    Acute bacterial endocarditis    Septic embolism (HCC)    Bipolar 1 disorder (HCC)    Right hip pain    Intravenous drug abuse ([address] )    DVT of axillary vein, acute left ([facility])    Transaminitis    Rash Anemia    Hyperphosphatemia    S/P TVR (tricuspid valve repair)       Severe Tricuspid Regurgitation, Tricuspid Valve Endocarditis  S/P tricuspid valve repair; POD # 5    Plan:    1  Cardiac:   NSR; HR/BP well-controlled  Lopressor, 25mg PO BID  Continue ASA   Anticoagulation needed for acute L axillary DVT   PICC line in place for endocarditis antibiotics   Continue DVT prophylaxis    2  Pulmonary:   Good Room air oxygen saturation; Continue incentive spirometry/Coughing/Deep breathing exercises  Chest tubes have been discontinued    3  Renal:   Continue diuresis   Post op Creatinine stable; Follow up labs prn    4  Neuro: Neurologically intact, pain management per APS    5  GI:  Tolerating TLC 2 3 gm sodium diet  Maintain 1800 mL daily fluid restriction   Continue stool softeners and prn suppository  Continue GI prophylaxis    6  Endo: SSI coverage     7    Hematology:    Post-operative acute blood loss anemia; Hemoglobin 7 0; trend prn    8  Disposition:      Anticipate discharge to home when antibiotics complete per ID      VTE Pharmacologic Prophylaxis: Warfarin (Coumadin)  VTE Mechanical Prophylaxis: sequential compression device    Collaborative rounds completed with SARAH Jasmine    Plan of care discussed with bedside nurse    SIGNATURE: Wilman Bhakta PA-C  DATE: September 15, 2020  TIME: 12:03 PM

## 2020-09-15 NOTE — PHYSICAL THERAPY NOTE
Physical Therapy Treatment Note      09/15/20 1126   PT Last Visit   PT Visit Date 09/15/20   Pain Assessment   Pain Assessment Tool 0-10   Pain Score Worst Possible Pain   Pain Location/Orientation Location: Chest   Pain Onset/Description Onset: Ongoing   Hospital Pain Intervention(s) Medication (See MAR); Repositioned; Emotional support   Restrictions/Precautions   Weight Bearing Precautions Per Order No   Other Precautions Cardiac/sternal   General   Chart Reviewed Yes   Cognition   Overall Cognitive Status WFL   Arousal/Participation Uncooperative   Following Commands Follows all commands and directions without difficulty   Subjective   Subjective "I 'm tired "   Bed Mobility   Rolling R 6  Modified independent   Rolling L 6  Modified independent   Supine to Sit 4  Minimal assistance   Transfers   Sit to Stand 6  Modified independent   Stand to Sit 6  Modified independent   Stand pivot 5  Supervision   Additional items   (assist with IV pole)   Ambulation/Elevation   Gait pattern Foward flexed   Gait Assistance 5  Supervision   Additional items Assist x 1;Verbal cues   Assistive Device Rolling walker   Distance 150 feet   Stair Management Assistance Not tested  (pt declined)   Balance   Static Sitting Good   Static Standing Fair +  (R)   Ambulatory Fair +  (RW)   Activity Tolerance   Activity Tolerance Patient limited by pain; Patient limited by fatigue   Nurse Made Aware cleared to see by nursing   Assessment   Prognosis Good   Problem List Decreased endurance;Decreased strength;Decreased range of motion; Impaired balance;Decreased mobility; Impaired judgement;Decreased skin integrity;Pain   Assessment Pt cleared for treatment by nursing  Note hgb 7 0 on 9/14/2020  Pt declines initial visit due to c/o being tired  Therapist returns at lunch prior to tray delivery for OOB/ambulation  Pt in bed on arrival  Required assistance to progress to sitting due to c/o pain in chest and right hip   Pt initially declined use of RW for ambulation but then requested once she stood up  Pt gait was stable with RW, no antalgic pattern noted today  VC for posture  Pt irritable during limited session as she wanted to continue to sleep  She was assisted with tray set up  Declined sitting OOB in chair, seated at EOB for meal  She demonstrated ability to transfer from bed to stand without difficulty, mod  Ind  Will continue to follow  Still to do steps  Declines any further treatment today, "just leave me alone please "   Barriers to Discharge Inaccessible home environment   Goals   Patient Goals to get more sleep   STG Expiration Date 09/25/20   Long Term Goal #1 10-14 days  Pt will amb 300 ft w/ least restrictive assistive device PRN, mod (I) in order to facilitate safe return to premorbid environment and community amb status  Pt will negotiate 2 steps w/ hand rail (if available at home) and SPC PRN, mod (I) and 14 steps w/ hand rail (if available at home) and SPC PRN, mod (I) in order to navigate in and out of the house and between levels of home environment safely  Pt will achieve (I) level w/ bed mob in order to facilitate safety with OOB and back to bed transitions in own living environment  Pt will perform transfers w/ mod (I) to assure (I) and safety w/ functional mobility/transitions w/ all aspects of mobility/locomotion  Pt will participate in LE therex and balance activities to max progression w/ mobility skills (will defer from (R) hip therex at this time due to ongoing issues)  PT Treatment Day 2   Plan   Treatment/Interventions Functional transfer training; Endurance training;Gait training;Bed mobility; Patient/family training; Compensatory technique education;Spoke to nursing   Progress Slow progress, decreased activity tolerance   PT Frequency Other (Comment)  (3-6x/wk)   Recommendation   PT Discharge Recommendation Return to previous environment with social support   Equipment Recommended   (TBD closer to discharge)   PT - OK to Discharge No   Additional Comments   (still to do steps)   Deepika Sumner, PT

## 2020-09-15 NOTE — PROGRESS NOTES
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:  Prolonged course of IV antibiotic was previously recommended, but patient has left AMA on 2 prior occasions   She remains hemodynamically stable   Repeat blood culture negative on 08/15/2020; operative culture also negative  -continue cefazolin at least through 9/26/2020 to complete 6 weeks from the 1st negative blood culture  -patient is postop day 5 for tricuspid valve surgery; operative culture negative  Would continue IV antibiotic treatment to finish 6 weeks total through 09/27/2020  -check CBC with diff and CMP weekly while on the IV antibiotics     2  Tricuspid valve endocarditis, 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:  MELISSA done 07/21/2020 showed large vegetation on TV with severe regurgitation  7/15 CT abdomen/pelvis also showed bilateral renal parenchymal abnormalities concerning for septic emboli, patient also had right-sided loculated pleural effusion   No intra-abdominal abscess   Repeat CT chest 09/04/2020 shows resolution of right-sided pleural effusion  Patient is status post repair of tricuspid valve with annuloplasty on 09/10/2020; sternal wound shows no dehiscence, no drainage or erythema  -antibiotic plan as above  -close CT surgery follow-up     3   Right iliacus muscle abscesses/sacroiliitis:  CT scan of right lower extremity on 08/21/2020 showed 9 mm iliacus muscle collection, decreasing in size   Follow-up imaging with MRI done 08/31/2020 with evidence of sacroiliitis that is probably septic   There are no destructive changes or fluid collection   Suspect this is the cause of the patient's ongoing pain   Inflammatory markers continue to decrease, ESR now normal; CRP up to 113, though this is possibly secondary to her recent sternotomy and valvular surgery  -antibiotics as above  -will repeat ESR and CRP 09/21/2020;  if inflammatory markers have not returned to normal before end of IV antibiotic treatment, would consider additional course of p o  Antibiotics   -pain management        4  Back pain:    improving, more likely secondary to chronic pain, opioid dependence   8/1/20 Thoracic and lumbar spine MRIs performed at [facility] [place] negative were for abscess or osteomyelitis   CT scan nondiagnostic for osteomyelitis or diskitis   Sedimentation rate and CRP have significantly decreased, though now again elevated likely due to recent surgery  Patient seen today ambulating, with no signs of pain and no limping  -monitor back pain   -serial exams  -acute pain service follow-up  -recheck CRP and ESR early next week as mentioned above     5  Active IV heroin abuse:   This is the causative factor for development of bacteremia and infective endocarditis   Patient has left AMA on prior occasions   HIV screen negative  -patient is not a candidate for at home PICC line/IV antibiotics  -acute pain service follow-up     6  Chronic HCV infection:   Recent HIV was negative   The LFTs are waxing waning  Review of Care everywhere shows high hepatitis C viral load 01/2020   -monitor LFTs   - outpatient follow-up with GI     7  Left upper extremity DVT:  in the setting of PICC line use   Patient now on anticoagulation  -vascular follow-up  -anticoagulation as per primary and vascular team       Discussed the above management plan with the primary service  Plan mentioned above discussed with the patient     Antibiotics:  Cefazolin restart 36  Negative blood cultures 28  Postop day 4    Subjective:  Patient has no fever, chills, sweats; no nausea, vomiting, diarrhea; no cough, shortness of breath;    Complaining of chest pain at site of her sternal wound    Objective:  Vitals:  Temp:  [97 7 °F (36 5 °C)-98 2 °F (36 8 °C)] 97 7 °F (36 5 °C)  HR:  [78-95] 78  Resp:  [16-18] 18  BP: ()/(48-68) 99/48  SpO2:  [92 %-99 %] 97 %  Temp (24hrs), Av °F (36 7 °C), Min:97 7 °F (36 5 °C), Max:98 2 °F (36 8 °C)  Current: Temperature: 97 7 °F (36 5 °C)    Physical Exam:   General Appearance:  Alert, interactive, nontoxic, no acute distress  Throat: Oropharynx moist without lesions  Lungs:   Clear to auscultation bilaterally; no wheezes, rhonchi or rales; respirations unlabored   Heart:  RRR; no murmur, rub or gallop   Abdomen:   Soft, non-tender, non-distended, positive bowel sounds  Extremities: No clubbing, cyanosis or edema   Skin: No new rashes or lesions    Sternal wound is healing well, shows no purulence, no surrounding erythema, no dehiscence       Labs, Imaging, & Other studies:   All pertinent labs and imaging studies were personally reviewed  Results from last 7 days   Lab Units 20  0853 20  0417 20  1004 20  0921   WBC Thousand/uL 5 42 6 10  --  6 08   HEMOGLOBIN g/dL 7 0* 7 4* 7 5* 6 4*   PLATELETS Thousands/uL 181 170  --  115*     Results from last 7 days   Lab Units 20  0438 20  0437 20  0417 20  0605  09/10/20  1131  09/10/20  0449 20  0525   SODIUM mmol/L 141  --  140 138   < >  --   --  138 140   POTASSIUM mmol/L 3 5  --  3 5 3 6   < >  --   --  4 1 4 0   CHLORIDE mmol/L 106  --  105 104   < >  --   --  105 105   CO2 mmol/L 29  --  28 27   < >  --   --  29 29   CO2, I-STAT mmol/L  --   --   --   --   --  25   < >  --   --    BUN mg/dL 8  --  7 8   < >  --   --  17 15   CREATININE mg/dL 0 32*  --  0 33* 0 37*   < >  --   --  0 47* 0 40*   EGFR ml/min/1 73sq m 154  --  153 147   < >  --   --  136 143   GLUCOSE, ISTAT mg/dl  --   --   --   --   --  88   < >  --   --    CALCIUM mg/dL 8 0*  --  8 2* 8 3   < >  --   --  8 8 9 2   AST U/L  --  19  --   --   --   --   --  47* 46*   ALT U/L  --  14  --   --   --   --   --  28 30   ALK PHOS U/L  --  128*  --   --   --   --   --  174* 189*    < > = values in this interval not displayed       Results from last 7 days   Lab Units 09/10/20  0922   GRAM STAIN RESULT  2+ Polys  No organisms seen         Results from last 7 days   Lab Units 09/14/20  0437   CRP mg/L 113 0*     Results from last 7 days   Lab Units 09/10/20  0449   FERRITIN ng/mL 71

## 2020-09-15 NOTE — PROGRESS NOTES
Progress Note - Acute Pain Service    Brit Gaitan 34 y o  female MRN: [id number]  Unit/Bed#: [facility] 421-01 Encounter: [id number]      Assessment:   Principal Problem:    Bacteremia due to Staphylococcus aureus  Active Problems:    Acute bacterial endocarditis    Septic embolism (HCC)    Bipolar 1 disorder (HCC)    Right hip pain    Intravenous drug abuse ([address] )    DVT of axillary vein, acute left (HCC)    Transaminitis    Rash    Anemia    Hyperphosphatemia    S/P TVR (tricuspid valve repair)      Plan:   · Continue acetaminophen 975 mg p o  q 8 hours scheduled  · Continue ketamine at 0 1 mg/kg/HR, will likely discontinue later today  · Continue oxycodone 15 mg p o  q 4 hours p r n  severe pain  · Suggest change hydromorphone to 2 mg IV q 4 hours p r n  breakthrough pain  · Continue duloxetine 60 mg p o  daily scheduled  · Continue lorazepam 1 5 mg IV q 2 hours p r n  anxiety, plan to decrease to 1 mg IV q 2 hours on 9/17/20  · Continue Zyprexa 5 mg p o  hs scheduled  · Continue methocarbamol 750 mg p o  q 6 hours scheduled  · Continue lidocaine patch x2 for 12 hours daily  · Continue bowel regimen to avoid opioid induced constipation  [facility] will continue to follow; please contact [facility] ( [phone]) with any further questions    Pain History  Current pain location(s):  Right hip, anterior chest wall, mid upper back  Pain Scale:   9/10  Quality:  Aching  24 hour history:  Pain continues to slowly improve  Tolerating current regimen and agreeable to decreasing frequency of IV Dilaudid  No new issues  Opioid requirement previous 24 hours:  Hydromorphone 22 mg IV, oxycodone 75 mg p o      Meds/Allergies   all current active meds have been reviewed, current meds:   Current Facility-Administered Medications   Medication Dose Route Frequency    acetaminophen (TYLENOL) tablet 975 mg  975 mg Oral Q8H    amiodarone tablet 200 mg  200 mg Oral Q8H [address]    ascorbic acid (VITAMIN C) tablet 500 mg 500 mg Oral Daily    aspirin chewable tablet 81 mg  81 mg Oral Daily    bisacodyl (DULCOLAX) rectal suppository 10 mg  10 mg Rectal Daily PRN    calcium acetate (PHOSLO) capsule 667 mg  667 mg Oral TID With Meals    calcium carbonate (TUMS) chewable tablet 1,000 mg  1,000 mg Oral Daily PRN    ceFAZolin (ANCEF) IVPB (premix) 2,000 mg 50 mL  2,000 mg Intravenous Q8H    dicyclomine (BENTYL) capsule 10 mg  10 mg Oral TID PRN    docusate sodium (COLACE) capsule 100 mg  100 mg Oral BID    DULoxetine (CYMBALTA) delayed release capsule 60 mg  60 mg Oral Daily    ferrous sulfate tablet 325 mg  325 mg Oral Daily With Breakfast    fondaparinux (ARIXTRA) subcutaneous injection 2 5 mg  2 5 mg Subcutaneous Q24H    furosemide (LASIX) injection 20 mg  20 mg Intravenous Daily    glycopyrrolate (ROBINUL) injection 0 2 mg  0 2 mg Intravenous Q4H PRN    haloperidol lactate (HALDOL) injection 2 mg  2 mg Intramuscular Q30 Min PRN    HYDROmorphone (DILAUDID) injection 2 mg  2 mg Intravenous Q2H PRN    insulin lispro (HumaLOG) 100 units/mL subcutaneous injection 1-5 Units  1-5 Units Subcutaneous TID AC    iohexol (OMNIPAQUE) 240 MG/ML solution 50 mL  50 mL Oral 90 min pre-procedure    ketamine 250 mg (STANDARD CONCENTRATION) IV in sodium chloride 0 9% 250 mL  0 1 mg/kg/hr Intravenous Continuous    lidocaine (LIDODERM) 5 % patch 2 patch  2 patch Topical Daily    LORazepam (ATIVAN) injection 1 5 mg  1 5 mg Intravenous Q2H PRN    magnesium oxide (MAG-OX) tablet 400 mg  400 mg Oral BID    melatonin tablet 6 mg  6 mg Oral HS    methocarbamol (ROBAXIN) tablet 750 mg  750 mg Oral Q6H [facility]    metoprolol tartrate (LOPRESSOR) tablet 25 mg  25 mg Oral Q12H [facility]    miconazole 2 % cream   Topical BID    mirtazapine (REMERON) tablet 30 mg  30 mg Oral HS    OLANZapine (ZyPREXA) tablet 5 mg  5 mg Oral HS    ondansetron (ZOFRAN) injection 4 mg  4 mg Intravenous Q6H PRN    oxyCODONE (ROXICODONE) IR tablet 15 mg  15 mg Oral Q4H PRN  pantoprazole (PROTONIX) EC tablet 40 mg  40 mg Oral Daily    polyethylene glycol (MIRALAX) packet 17 g  17 g Oral Daily    potassium chloride (K-DUR,KLOR-CON) CR tablet 10 mEq  10 mEq Oral Daily    saccharomyces boulardii (FLORASTOR) capsule 250 mg  250 mg Oral BID    senna (SENOKOT) tablet 8 6 mg  1 tablet Oral BID    and PTA meds:   None       Allergies   Allergen Reactions    Cat Hair Extract     Dog Epithelium     Latex     Pollen Extract        Objective     Temp:  [97 7 °F (36 5 °C)-98 7 °F (37 1 °C)] 97 7 °F (36 5 °C)  HR:  [77-95] 78  Resp:  [16-18] 18  BP: ()/(48-68) 99/48    Physical Exam  Vitals signs and nursing note reviewed  Constitutional:       General: She is awake  She is not in acute distress  Appearance: She is not toxic-appearing  Eyes:      Pupils: Pupils are equal, round, and reactive to light  Cardiovascular:      Rate and Rhythm: Normal rate and regular rhythm  Skin:     General: Skin is warm and dry  Neurological:      General: No focal deficit present  Mental Status: She is alert and oriented to person, place, and time  GCS: GCS eye subscore is 4  GCS verbal subscore is 5  GCS motor subscore is 6  Psychiatric:         Behavior: Behavior is cooperative  Lab Results:   Results from last 7 days   Lab Units 09/14/20  0853   WBC Thousand/uL 5 42   HEMOGLOBIN g/dL 7 0*   HEMATOCRIT % 22 4*   PLATELETS Thousands/uL 181      Results from last 7 days   Lab Units 09/14/20  0438 09/14/20  0437  09/10/20  1131   POTASSIUM mmol/L 3 5  --    < >  --    CHLORIDE mmol/L 106  --    < >  --    CO2 mmol/L 29  --    < >  --    CO2, I-STAT mmol/L  --   --   --  25   BUN mg/dL 8  --    < >  --    CREATININE mg/dL 0 32*  --    < >  --    CALCIUM mg/dL 8 0*  --    < >  --    ALK PHOS U/L  --  128*  --   --    ALT U/L  --  14  --   --    AST U/L  --  19  --   --    GLUCOSE, ISTAT mg/dl  --   --   --  88    < > = values in this interval not displayed  Imaging Studies: I have personally reviewed pertinent reports  EKG, Pathology, and Other Studies: I have personally reviewed pertinent reports  Counseling / Coordination of Care  Total floor / unit time spent today 20 minutes  Greater than 50% of total time was spent with the patient and / or family counseling and / or coordination of care  A description of the counseling / coordination of care:  Patient interview, physical examination, review of medical record, review of imaging and laboratory data, development of pain management plan, discussion of pain management plan with patient and primary service      Flores Medel PA-C

## 2020-09-15 NOTE — PLAN OF CARE
Problem: PHYSICAL THERAPY ADULT  Goal: Performs mobility at highest level of function for planned discharge setting  See evaluation for individualized goals  Description: Treatment/Interventions: Functional transfer training, LE strengthening/ROM, Elevations, Therapeutic exercise, Endurance training, Equipment eval/education, Bed mobility, Gait training, Spoke to nursing, Spoke to case management, Spoke to advanced practitioner, OT(will defer from (R) hip therex at this time)  Equipment Recommended: Walker(at this time; will cont to monitor progress)       See flowsheet documentation for full assessment, interventions and recommendations  Outcome: Progressing  Note: Prognosis: Good  Problem List: Decreased endurance, Decreased strength, Decreased range of motion, Impaired balance, Decreased mobility, Impaired judgement, Decreased skin integrity, Pain  Assessment: Pt cleared for treatment by nursing  Note hgb 7 0 on 9/14/2020  Pt declines initial visit due to c/o being tired  Therapist returns at lunch prior to tray delivery for OOB/ambulation  Pt in bed on arrival  Required assistance to progress to sitting due to c/o pain in chest and right hip  Pt initially declined use of RW for ambulation but then requested once she stood up  Pt gait was stable with RW, no antalgic pattern noted today  VC for posture  Pt irritable during limited session as she wanted to continue to sleep  She was assisted with tray set up  Declined sitting OOB in chair, seated at EOB for meal  She demonstrated ability to transfer from bed to stand without difficulty, mod  Ind  Will continue to follow  Still to do steps  Declines any further treatment today, "just leave me alone please "  Barriers to Discharge: Inaccessible home environment     PT Discharge Recommendation: Return to previous environment with social support     PT - OK to Discharge: No    See flowsheet documentation for full assessment

## 2020-09-15 NOTE — PROGRESS NOTES
Cardiology Progress Note - Rhae Fothergill 34 y o  female MRN: [id number]    Unit/Bed#: [facility] 421-01 Encounter: [id number]        Subjective:    No significant events overnight  Sleepy today  Review of Systems   Cardiovascular: Negative for chest pain, leg swelling and palpitations  Respiratory: Negative for shortness of breath  Objective:   Vitals: Blood pressure (!) 99/48, pulse 78, temperature 97 7 °F (36 5 °C), temperature source Axillary, resp  rate 18, height 5' 5" (1 651 m), weight 72 kg (158 lb 11 7 oz), SpO2 97 %, not currently breastfeeding , Body mass index is 26 41 kg/m² ,   Orthostatic Blood Pressures      Most Recent Value   Blood Pressure  (!) 99/48 filed at 09/15/2020 0757   Patient Position - Orthostatic VS  Lying filed at 09/15/2020 7923         Systolic (27RZV), AQM:754 , Min:97 , LEANN:929     Diastolic (42EGI), LLD:64, Min:48, Max:68      Intake/Output Summary (Last 24 hours) at 9/15/2020 0943  Last data filed at 9/15/2020 0759  Gross per 24 hour   Intake 784 57 ml   Output 800 ml   Net -15 43 ml     Weight (last 2 days)     Date/Time   Weight    09/14/20 0600   72 (158 73)                  Telemetry Review: NSR    Physical Exam  Cardiovascular:      Rate and Rhythm: Normal rate and regular rhythm  Heart sounds: Normal heart sounds  No murmur  No friction rub  No gallop  Pulmonary:      Breath sounds: Normal breath sounds  No wheezing or rales             Laboratory Results:  Results from last 7 days   Lab Units 09/09/20  1350   CK TOTAL U/L 27       CBC with diff:   Results from last 7 days   Lab Units 09/14/20  0853 09/13/20  0417 09/12/20  1004 09/12/20  0921 09/11/20  0321 09/10/20  1838 09/10/20  1133  09/10/20  1005  09/09/20  0525   WBC Thousand/uL 5 42 6 10  --  6 08 7 18  --   --   --   --   --  5 84   HEMOGLOBIN g/dL 7 0* 7 4* 7 5* 6 4* 8 3* 9 1* 10 8*  --   --   --  10 4*   I STAT HEMOGLOBIN   --   --   --   --   --   --   --    < >  --    < >  -- HEMATOCRIT % 22 4* 24 7* 24 2* 20 8* 26 0* 28 2* 34 4*  --   --   --  33 7*   HEMATOCRIT, ISTAT   --   --   --   --   --   --   --    < >  --    < >  --    MCV fL 89 91  --  90 86  --   --   --   --   --  87   PLATELETS Thousands/uL 181 170  --  115* 154  --  269  --  277  --  304   MCH pg 27 7 27 1  --  27 6 27 4  --   --   --   --   --  26 9   MCHC g/dL 31 3* 30 0*  --  30 8* 31 9  --   --   --   --   --  30 9*   RDW % 14 3 14 8  --  15 1 14 8  --   --   --   --   --  14 6   MPV fL 9 1 9 0  --  9 0 8 4*  --  8 2*  --  8 5*  --  8 8*   NRBC AUTO /100 WBCs  --   --   --   --   --   --   --   --   --   --  0    < > = values in this interval not displayed           CMP:  Results from last 7 days   Lab Units 09/14/20  0438 09/14/20  0437 09/13/20  0417 09/12/20  0605 09/11/20  0321 09/10/20  1838 09/10/20  1457 09/10/20  1133 09/10/20  1131 09/10/20  1036 09/10/20  0956 09/10/20  0936 09/10/20  0932 09/10/20  0927 09/10/20  0858  09/10/20  0449 09/09/20  0525   POTASSIUM mmol/L 3 5  --  3 5 3 6 4 1 4 6 5 8* 4 0  --   --   --   --   --   --   --   --  4 1 4 0   CHLORIDE mmol/L 106  --  105 104 104  --   --  108  --   --   --   --   --   --   --   --  105 105   CO2 mmol/L 29  --  28 27 26  --   --  26  --   --   --   --   --   --   --   --  29 29   CO2, I-STAT mmol/L  --   --   --   --   --   --   --   --  25 27 27 26 30 26 25   < >  --   --    BUN mg/dL 8  --  7 8 13  --   --  14  --   --   --   --   --   --   --   --  17 15   CREATININE mg/dL 0 32*  --  0 33* 0 37* 0 36*  --   --  0 42*  --   --   --   --   --   --   --   --  0 47* 0 40*   GLUCOSE, ISTAT mg/dl  --   --   --   --   --   --   --   --  88 172* 220* 147* 140 128 124   < >  --   --    CALCIUM mg/dL 8 0*  --  8 2* 8 3 8 1*  --   --  8 1*  --   --   --   --   --   --   --   --  8 8 9 2   AST U/L  --  19  --   --   --   --   --   --   --   --   --   --   --   --   --   --  47* 46*   ALT U/L  --  14  --   --   --   --   --   --   --   --   --   --   --   -- --   --  28 30   ALK PHOS U/L  --  128*  --   --   --   --   --   --   --   --   --   --   --   --   --   --  174* 189*   EGFR ml/min/1 73sq m 154  --  153 147 148  --   --  141  --   --   --   --   --   --   --   --  136 143    < > = values in this interval not displayed  BMP:    Magnesium:   Results from last 7 days   Lab Units 20  0605 20  0321 20  0525   MAGNESIUM mg/dL 1 8 2 2 2 2       Coags:   Results from last 7 days   Lab Units 20  0438 20  0417 09/10/20  1838   PTT seconds  --   --  33   INR  1 56* 1 16 1 06       TSH:       Lipid Profile:   Results from last 7 days   Lab Units 20  1350   TRIGLYCERIDES mg/dL 266*   HDL mg/dL 45           Cardiac testing:   Results for orders placed during the hospital encounter of 07/15/20   Echo complete with contrast if indicated    Narrative [place] [address], [address] [facility]  [phone]    Transthoracic Echocardiogram  2D, M-mode, Doppler, and Color Doppler    Study date:  2020    Patient: Lissett Dewey  MR number: [id number]  Account number: [de-identified]  : 1990  Age: 34 years  Gender: Female  Status: Inpatient  Location: Bedside  Height: 65 in  Weight: 157 7 lb  BP: 109/ 58 mmHg    Indications: Rule out endocarditis  Diagnoses: I38  - Endocarditis, valve unspecified    Sonographer:  CANDY Evangelista  Primary Physician:  Shruthi Mccray PA-C  Referring Physician:  Mary Linder PA-C  Group:  [facility]  Interpreting Physician:  Angelina Quintero MD    SUMMARY    LEFT VENTRICLE:  Systolic function was normal  Ejection fraction was estimated to be 65 %  There were no regional wall motion abnormalities  TRICUSPID VALVE:  There is an approximately 1cm atrial aspect mass on the free wall leaflet, best seen on image 39  There appears to be highly eccentric tricuspid regurgitation and may represent leaflet perforation    There was moderate regurgitation  RECOMMENDATIONS:  If clinically indicated, transesophageal echocardiography could provide additional information  HISTORY: PRIOR HISTORY: Sepsis, dyspnea, heroin abuse, Hep C, former smoker  PROCEDURE: The procedure was performed at the bedside  This was a routine study  The transthoracic approach was used  The study included complete 2D imaging, M-mode, complete spectral Doppler, and color Doppler  The heart rate was 52 bpm,  at the start of the study  Image quality was good  LEFT VENTRICLE: Size was normal  Systolic function was normal  Ejection fraction was estimated to be 65 %  There were no regional wall motion abnormalities  Wall thickness was normal  DOPPLER: Left ventricular diastolic function parameters  were normal     RIGHT VENTRICLE: The size was normal  Systolic function was normal  Wall thickness was normal     LEFT ATRIUM: Size was normal     RIGHT ATRIUM: Size was normal     MITRAL VALVE: Valve structure was normal  There was normal leaflet separation  DOPPLER: The transmitral velocity was within the normal range  There was no evidence for stenosis  There was no significant regurgitation  AORTIC VALVE: The valve was trileaflet  Leaflets exhibited normal thickness and normal cuspal separation  DOPPLER: Transaortic velocity was within the normal range  There was no evidence for stenosis  There was no significant  regurgitation  TRICUSPID VALVE: There is an approximately 1cm atrial aspect mass on the free wall leaflet, best seen on image 39  There appears to be highly eccentric tricuspid regurgitation and may represent leaflet perforation  The valve structure was  normal  There was normal leaflet separation  DOPPLER: The transtricuspid velocity was within the normal range  There was no evidence for stenosis  There was moderate regurgitation  PULMONIC VALVE: Leaflets exhibited normal thickness, no calcification, and normal cuspal separation   DOPPLER: The transpulmonic velocity was within the normal range  There was no significant regurgitation  PERICARDIUM: There was no pericardial effusion  The pericardium was normal in appearance  AORTA: The root exhibited normal size  SYSTEMIC VEINS: IVC: The inferior vena cava was normal in size  SYSTEM MEASUREMENT TABLES    2D  %FS: 41 1 %  Ao Diam: 2 86 cm  EDV(Teich): 81 52 ml  EF(Teich): 72 28 %  ESV(Teich): 22 6 ml  IVSd: 0 66 cm  LA Area: 20 97 cm2  LA Diam: 3 77 cm  LVEDV MOD A4C: 145 97 ml  LVEF MOD A4C: 59 16 %  LVESV MOD A4C: 59 62 ml  LVIDd: 4 27 cm  LVIDs: 2 51 cm  LVLd A4C: 8 93 cm  LVLs A4C: 7 27 cm  LVPWd: 0 62 cm  RA Area: 15 83 cm2  RVIDd: 3 79 cm  SV MOD A4C: 86 36 ml  SV(Teich): 58 92 ml    CW  AV Env  Ti: 325 26 ms  AV MaxPG: 10 7 mmHg  AV VTI: 34 98 cm  AV Vmax: 1 63 m/s  AV Vmean: 1 07 m/s  AV meanP 25 mmHg  TR MaxP 62 mmHg  TR Vmax: 2 27 m/s    MM  TAPSE: 2 76 cm    PW  E': 0 13 m/s  E/E': 8 22  LVOT Env  Ti: 327 57 ms  LVOT VTI: 23 27 cm  LVOT Vmax: 1 11 m/s  LVOT Vmean: 0 71 m/s  LVOT maxP 92 mmHg  LVOT meanP 36 mmHg  MV A Darrell: 0 47 m/s  MV Dec [place]: 4 59 m/s2  MV DecT: 232 16 ms  MV E Darrell: 1 06 m/s  MV E/A Ratio: 2 25  MV PHT: 67 33 ms  MVA By PHT: 3 27 cm2    [employer] Accredited Echocardiography Laboratory    Prepared and electronically signed by    Gabo Guevara MD  Signed 2020 16:39:57       Results for orders placed during the hospital encounter of 07/15/20   MELISSA    Narrative [address], [address] [facility]  [phone]    Transesophageal Echocardiogram  2D, Doppler, and Color Doppler    Study date:  2020    Patient: Janett Sykes  MR number: [id number]  Account number: [de-identified]  : 1990  Age: 34 years  Gender: Female  Status: Inpatient  Location: GI LAB  Height: 65 in  Weight: 158 lb  BP: 119/ 71 mmHg    Indications: 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 valve    Diagnoses: I36 9 - Nonrheumatic tricuspid valve disorder, unspecified, I38  - Endocarditis, valve unspecified    Sonographer:  AMANDA Yi, RDCS  Primary Physician:  Yaya Watters PA-C  Referring Physician:  Felix Walter MD  Group:  [facility]  RN:  My Choudhary  Interpreting Physician:  Felix Walter MD    SUMMARY    LEFT VENTRICLE:  Size was normal   Systolic function was normal  Ejection fraction was estimated to be 65 %  There were no regional wall motion abnormalities  Wall thickness was normal     RIGHT VENTRICLE:  The size was normal   Systolic function was normal     ATRIAL SEPTUM:  There was a small patent foramen ovale  Doppler evaluation was performed  Cameron Merle MITRAL VALVE:  There was trace regurgitation  TRICUSPID VALVE:  There was moderate to severe regurgitation  There was a medium-sized, papillary, mobile vegetation, measuring 11 mm x 10 mm on the tip of the anterior leaflet, on the right atrial aspect  HISTORY: PRIOR HISTORY: Heroine abuse; Pleural effusion; Sepsis; MSSA; Pulmonary emboli; Bacteremia; Abscess of left foot; Acute Pyelonephritis    PROCEDURE: The study was performed in the GI LAB  This was a routine study  The risks and alternatives of the procedure were explained to the patient and informed consent was obtained  The transesophageal approach was used  The study  included complete 2D imaging, complete spectral Doppler, and color Doppler  The heart rate was 80 bpm, at the start of the study  An adult omniplane probe was inserted by the attending cardiologist  Intubated with ease  One intubation  attempt(s)  There was no blood detected on the probe  Image quality was adequate  There were no complications during the procedure  MEDICATIONS: Benzocaine spray, topical to oropharynx, prior to procedure  Anesthesia administered by  anesthesia team     LEFT VENTRICLE: Size was normal  Systolic function was normal  Ejection fraction was estimated to be 65 %   There were no regional wall motion abnormalities  Wall thickness was normal     RIGHT VENTRICLE: The size was normal  Systolic function was normal  Wall thickness was normal     LEFT ATRIUM: Size was normal  No thrombus was identified  APPENDAGE: No thrombus was identified  ATRIAL SEPTUM: There was a small patent foramen ovale  Doppler evaluation was performed  Kari Hoover RIGHT ATRIUM: Size was normal  No thrombus was identified  MITRAL VALVE: Valve structure was normal  There was normal leaflet separation  There was no echocardiographic evidence of vegetation  DOPPLER: There was trace regurgitation  AORTIC VALVE: The valve was trileaflet  Leaflets exhibited normal thickness and normal cuspal separation  There was no echocardiographic evidence of vegetation  DOPPLER: There was no regurgitation  TRICUSPID VALVE: The valve structure was normal  There was normal leaflet separation  There was malcoaptation of the valve leaflets  There was a medium-sized, papillary, mobile vegetation, measuring 11 mm x 10 mm on the tip of the anterior  leaflet, on the right atrial aspect  DOPPLER: There was moderate to severe regurgitation  The regurgitant jet was toward the septum  PULMONIC VALVE: Leaflets exhibited normal thickness, no calcification, and normal cuspal separation  There was no echocardiographic evidence of vegetation  PERICARDIUM: There was no pericardial effusion  The pericardium was normal in appearance  AORTA: The root exhibited normal size  There was no atheroma  There was no evidence for dissection  There was no evidence for aneurysm  [address] Accredited Echocardiography Laboratory    Prepared and electronically signed by    Elieser Sheldon MD  Signed 21-Jul-2020 14:25:41       No results found for this or any previous visit  No results found for this or any previous visit      Meds/Allergies   Current Facility-Administered Medications   Medication Dose Route Frequency Provider Last Rate    acetaminophen  975 mg Oral Q8H [facility] MILLIE Higginbotham      amiodarone  200 mg Oral Q8H [facility] MILLIE Higginbotham      ascorbic acid  500 mg Oral Daily Debbie Mosqueda PA-C      aspirin  81 mg Oral Daily [facility] MILLIE Higginbotham      bisacodyl  10 mg Rectal Daily PRN [facility] MILLIE Higginbotham      calcium acetate  667 mg Oral TID With Meals [facility] MILLIE Higginbotham      calcium carbonate  1,000 mg Oral Daily PRN [facility] MILLIE Higginbotham      cefazolin  2,000 mg Intravenous Q8H Wiliam Zeineddine, MD 2,000 mg (09/15/20 3962)    dicyclomine  10 mg Oral TID PRN [facility] MILLIE Higginbotham      docusate sodium  100 mg Oral BID [facility] MILLIE Higginbotham      DULoxetine  60 mg Oral Daily [facility] MILLIE Higginbotham      ferrous sulfate  325 mg Oral Daily With Breakfast Amara Browning PA-C      fondaparinux  2 5 mg Subcutaneous Q24H Amara Browning PA-C      furosemide  20 mg Intravenous Daily Amara Browning PA-C      glycopyrrolate  0 2 mg Intravenous Q4H PRN Tomasvictorina Higginbotham PA-C      haloperidol lactate  2 mg Intramuscular Q30 Min PRN Tomasvictorina Higginbotham PA-C      HYDROmorphone  2 mg Intravenous Q2H PRN Debbie Mosqueda PA-C      insulin lispro  1-5 Units Subcutaneous TID ADDIE Judd PA-C      iohexol  50 mL Oral 90 min pre-procedure Tomas Higginbotham PA-C      ketamine  0 1 mg/kg/hr Intravenous Continuous Ramón MILLIE Mayo 0 1 mg/kg/hr (09/14/20 1557)    lidocaine  2 patch Topical Daily Gayatri Judd PA-C      LORazepam  1 5 mg Intravenous Q2H PRN Otto Richard PA-C      magnesium oxide  400 mg Oral BID Amara Browning PA-C      melatonin  6 mg Oral HS Amara Browning PA-C      methocarbamol  750 mg Oral Q6H [facility] Amara Browning PA-C      metoprolol tartrate  25 mg Oral Q12H [facility] Amara Browning PA-C      miconazole   Topical BID Tomas Higginbotham PA-C      mirtazapine  30 mg Oral HS Amara Browning PA-C      OLANZapine  5 mg Oral HS Amara Browning PA-C      ondansetron  4 mg Intravenous Q6H PRN Julietgrabiel Hines, MILLIE      oxyCODONE  15 mg Oral Q4H PRN Juliet Pride, MILLIE      pantoprazole  40 mg Oral Daily Juliet Flora, PA-C      polyethylene glycol  17 g Oral Daily Juliet Pride, PA-C      potassium chloride  10 mEq Oral Daily Amara Browning PA-C      saccharomyces boulardii  250 mg Oral BID Juliet Flora, MILLIE      senna  1 tablet Oral BID Dylan Judd PA-C       ketamine, 0 1 mg/kg/hr, Last Rate: 0 1 mg/kg/hr (09/14/20 3312)      No medications prior to admission  Assessment:  Principal Problem:    Bacteremia due to Staphylococcus aureus  Active Problems:    Acute bacterial endocarditis    Septic embolism (HCC)    Bipolar 1 disorder (HCC)    Right hip pain    Intravenous drug abuse ([facility] [address] )    DVT of axillary vein, acute left (HCC)    Transaminitis    Rash    Anemia    Hyperphosphatemia    S/P TVR (tricuspid valve repair)      Impression:  1  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

## 2020-09-16 LAB
BASOPHILS # BLD AUTO: 0.01 THOUSANDS/ΜL (ref 0–0.1)
BASOPHILS NFR BLD AUTO: 0 % (ref 0–1)
EOSINOPHIL # BLD AUTO: 0.8 THOUSAND/ΜL (ref 0–0.61)
EOSINOPHIL NFR BLD AUTO: 14 % (ref 0–6)
ERYTHROCYTE [DISTWIDTH] IN BLOOD BY AUTOMATED COUNT: 14.3 % (ref 11.6–15.1)
GLUCOSE SERPL-MCNC: 103 MG/DL (ref 65–140)
GLUCOSE SERPL-MCNC: 130 MG/DL (ref 65–140)
GLUCOSE SERPL-MCNC: 91 MG/DL (ref 65–140)
GLUCOSE SERPL-MCNC: 98 MG/DL (ref 65–140)
HCT VFR BLD AUTO: 23 % (ref 34.8–46.1)
HCT VFR BLD AUTO: 26 % (ref 34.8–46.1)
HGB BLD-MCNC: 6.9 G/DL (ref 11.5–15.4)
HGB BLD-MCNC: 8.1 G/DL (ref 11.5–15.4)
IMM GRANULOCYTES # BLD AUTO: 0.04 THOUSAND/UL (ref 0–0.2)
IMM GRANULOCYTES NFR BLD AUTO: 1 % (ref 0–2)
INR PPP: 2.34 (ref 0.84–1.19)
LYMPHOCYTES # BLD AUTO: 1.84 THOUSANDS/ΜL (ref 0.6–4.47)
LYMPHOCYTES NFR BLD AUTO: 31 % (ref 14–44)
MCH RBC QN AUTO: 27.2 PG (ref 26.8–34.3)
MCHC RBC AUTO-ENTMCNC: 30 G/DL (ref 31.4–37.4)
MCV RBC AUTO: 91 FL (ref 82–98)
MONOCYTES # BLD AUTO: 0.5 THOUSAND/ΜL (ref 0.17–1.22)
MONOCYTES NFR BLD AUTO: 8 % (ref 4–12)
NEUTROPHILS # BLD AUTO: 2.73 THOUSANDS/ΜL (ref 1.85–7.62)
NEUTS SEG NFR BLD AUTO: 46 % (ref 43–75)
NRBC BLD AUTO-RTO: 0 /100 WBCS
PLATELET # BLD AUTO: 253 THOUSANDS/UL (ref 149–390)
PMV BLD AUTO: 9.2 FL (ref 8.9–12.7)
PROTHROMBIN TIME: 25.5 SECONDS (ref 11.6–14.5)
RBC # BLD AUTO: 2.54 MILLION/UL (ref 3.81–5.12)
WBC # BLD AUTO: 5.92 THOUSAND/UL (ref 4.31–10.16)

## 2020-09-16 PROCEDURE — 85018 HEMOGLOBIN: CPT | Performed by: PHYSICIAN ASSISTANT

## 2020-09-16 PROCEDURE — 85025 COMPLETE CBC W/AUTO DIFF WBC: CPT | Performed by: PHYSICIAN ASSISTANT

## 2020-09-16 PROCEDURE — 82948 REAGENT STRIP/BLOOD GLUCOSE: CPT

## 2020-09-16 PROCEDURE — 85610 PROTHROMBIN TIME: CPT | Performed by: PHYSICIAN ASSISTANT

## 2020-09-16 PROCEDURE — 99232 SBSQ HOSP IP/OBS MODERATE 35: CPT | Performed by: INTERNAL MEDICINE

## 2020-09-16 PROCEDURE — 99024 POSTOP FOLLOW-UP VISIT: CPT | Performed by: THORACIC SURGERY (CARDIOTHORACIC VASCULAR SURGERY)

## 2020-09-16 PROCEDURE — 85014 HEMATOCRIT: CPT | Performed by: PHYSICIAN ASSISTANT

## 2020-09-16 RX ORDER — WARFARIN SODIUM 1 MG/1
3 TABLET ORAL
Status: COMPLETED | OUTPATIENT
Start: 2020-09-16 | End: 2020-09-16

## 2020-09-16 RX ADMIN — HYDROMORPHONE HYDROCHLORIDE 2 MG: 1 INJECTION, SOLUTION INTRAMUSCULAR; INTRAVENOUS; SUBCUTANEOUS at 02:54

## 2020-09-16 RX ADMIN — OXYCODONE HYDROCHLORIDE 15 MG: 10 TABLET ORAL at 11:31

## 2020-09-16 RX ADMIN — WARFARIN SODIUM 3 MG: 1 TABLET ORAL at 19:31

## 2020-09-16 RX ADMIN — OXYCODONE HYDROCHLORIDE 15 MG: 10 TABLET ORAL at 19:31

## 2020-09-16 RX ADMIN — LORAZEPAM 1.5 MG: 2 INJECTION INTRAMUSCULAR; INTRAVENOUS at 09:51

## 2020-09-16 RX ADMIN — ASPIRIN 81 MG CHEWABLE TABLET 81 MG: 81 TABLET CHEWABLE at 08:48

## 2020-09-16 RX ADMIN — CEFAZOLIN SODIUM 2000 MG: 2 SOLUTION INTRAVENOUS at 17:23

## 2020-09-16 RX ADMIN — OXYCODONE HYDROCHLORIDE AND ACETAMINOPHEN 500 MG: 500 TABLET ORAL at 08:48

## 2020-09-16 RX ADMIN — HYDROMORPHONE HYDROCHLORIDE 2 MG: 1 INJECTION, SOLUTION INTRAMUSCULAR; INTRAVENOUS; SUBCUTANEOUS at 20:08

## 2020-09-16 RX ADMIN — FUROSEMIDE 20 MG: 10 INJECTION, SOLUTION INTRAMUSCULAR; INTRAVENOUS at 08:38

## 2020-09-16 RX ADMIN — AMIODARONE HYDROCHLORIDE 200 MG: 200 TABLET ORAL at 21:13

## 2020-09-16 RX ADMIN — LORAZEPAM 1.5 MG: 2 INJECTION INTRAMUSCULAR; INTRAVENOUS at 20:45

## 2020-09-16 RX ADMIN — METHOCARBAMOL TABLETS 750 MG: 750 TABLET, COATED ORAL at 05:46

## 2020-09-16 RX ADMIN — CEFAZOLIN SODIUM 2000 MG: 2 SOLUTION INTRAVENOUS at 08:37

## 2020-09-16 RX ADMIN — MAGNESIUM OXIDE TAB 400 MG (241.3 MG ELEMENTAL MG) 400 MG: 400 (241.3 MG) TAB at 19:31

## 2020-09-16 RX ADMIN — ACETAMINOPHEN 975 MG: 325 TABLET, FILM COATED ORAL at 03:35

## 2020-09-16 RX ADMIN — METHOCARBAMOL TABLETS 750 MG: 750 TABLET, COATED ORAL at 11:28

## 2020-09-16 RX ADMIN — Medication 250 MG: at 08:47

## 2020-09-16 RX ADMIN — METHOCARBAMOL TABLETS 750 MG: 750 TABLET, COATED ORAL at 00:12

## 2020-09-16 RX ADMIN — OXYCODONE HYDROCHLORIDE 15 MG: 10 TABLET ORAL at 00:13

## 2020-09-16 RX ADMIN — METOPROLOL TARTRATE 25 MG: 25 TABLET, FILM COATED ORAL at 20:08

## 2020-09-16 RX ADMIN — MELATONIN 6 MG: at 20:08

## 2020-09-16 RX ADMIN — AMIODARONE HYDROCHLORIDE 200 MG: 200 TABLET ORAL at 14:54

## 2020-09-16 RX ADMIN — ALTEPLASE 2 MG: 2.2 INJECTION, POWDER, LYOPHILIZED, FOR SOLUTION INTRAVENOUS at 02:51

## 2020-09-16 RX ADMIN — HYDROMORPHONE HYDROCHLORIDE 2 MG: 1 INJECTION, SOLUTION INTRAMUSCULAR; INTRAVENOUS; SUBCUTANEOUS at 08:34

## 2020-09-16 RX ADMIN — POLYETHYLENE GLYCOL 3350 17 G: 17 POWDER, FOR SOLUTION ORAL at 08:48

## 2020-09-16 RX ADMIN — SENNOSIDES 8.6 MG: 8.6 TABLET ORAL at 19:30

## 2020-09-16 RX ADMIN — OXYCODONE HYDROCHLORIDE 15 MG: 10 TABLET ORAL at 04:35

## 2020-09-16 RX ADMIN — POTASSIUM CHLORIDE 10 MEQ: 750 TABLET, EXTENDED RELEASE ORAL at 08:53

## 2020-09-16 RX ADMIN — ACETAMINOPHEN 975 MG: 325 TABLET, FILM COATED ORAL at 11:28

## 2020-09-16 RX ADMIN — HYDROMORPHONE HYDROCHLORIDE 2 MG: 1 INJECTION, SOLUTION INTRAMUSCULAR; INTRAVENOUS; SUBCUTANEOUS at 14:56

## 2020-09-16 RX ADMIN — LORAZEPAM 1.5 MG: 2 INJECTION INTRAMUSCULAR; INTRAVENOUS at 04:35

## 2020-09-16 RX ADMIN — OLANZAPINE 5 MG: 5 TABLET, FILM COATED ORAL at 20:08

## 2020-09-16 RX ADMIN — PANTOPRAZOLE SODIUM 40 MG: 40 TABLET, DELAYED RELEASE ORAL at 08:47

## 2020-09-16 RX ADMIN — CALCIUM ACETATE 667 MG: 667 CAPSULE ORAL at 11:28

## 2020-09-16 RX ADMIN — MIRTAZAPINE 30 MG: 30 TABLET, FILM COATED ORAL at 20:08

## 2020-09-16 RX ADMIN — CALCIUM ACETATE 667 MG: 667 CAPSULE ORAL at 08:48

## 2020-09-16 RX ADMIN — MAGNESIUM OXIDE TAB 400 MG (241.3 MG ELEMENTAL MG) 400 MG: 400 (241.3 MG) TAB at 08:48

## 2020-09-16 RX ADMIN — ACETAMINOPHEN 975 MG: 325 TABLET, FILM COATED ORAL at 19:31

## 2020-09-16 RX ADMIN — AMIODARONE HYDROCHLORIDE 200 MG: 200 TABLET ORAL at 05:46

## 2020-09-16 RX ADMIN — METHOCARBAMOL TABLETS 750 MG: 750 TABLET, COATED ORAL at 19:31

## 2020-09-16 RX ADMIN — CEFAZOLIN SODIUM 2000 MG: 2 SOLUTION INTRAVENOUS at 00:10

## 2020-09-16 RX ADMIN — DULOXETINE HYDROCHLORIDE 60 MG: 60 CAPSULE, DELAYED RELEASE ORAL at 08:47

## 2020-09-16 RX ADMIN — FONDAPARINUX SODIUM 2.5 MG: 2.5 INJECTION, SOLUTION SUBCUTANEOUS at 11:29

## 2020-09-16 RX ADMIN — CALCIUM ACETATE 667 MG: 667 CAPSULE ORAL at 19:30

## 2020-09-16 RX ADMIN — SENNOSIDES 8.6 MG: 8.6 TABLET ORAL at 08:50

## 2020-09-16 RX ADMIN — DOCUSATE SODIUM 100 MG: 100 CAPSULE, LIQUID FILLED ORAL at 08:47

## 2020-09-16 RX ADMIN — DOCUSATE SODIUM 100 MG: 100 CAPSULE, LIQUID FILLED ORAL at 19:31

## 2020-09-16 RX ADMIN — FERROUS SULFATE TAB 325 MG (65 MG ELEMENTAL FE) 325 MG: 325 (65 FE) TAB at 08:48

## 2020-09-16 RX ADMIN — Medication 250 MG: at 19:30

## 2020-09-16 NOTE — PROGRESS NOTES
Progress Note - Cardiothoracic Surgery   Tristen Huerta 34 y o  female MRN: [id number]  Unit/Bed#: [facility] 421-01 Encounter: [id number]  Severe Tricuspid Regurgitation, Tricuspid Valve Endocarditis  S/P tricuspid valve repair; POD # 6    24 Hour Events: Hypotensive yesterday, SBP 80s-90s  Blood pressure has mildly recovered this morning  HR better controlled      Medications:   Scheduled Meds:  Current Facility-Administered Medications   Medication Dose Route Frequency Provider Last Rate    acetaminophen  975 mg Oral Q8H Juliet Hines PA-C      amiodarone  200 mg Oral Q8H [facility] Juliet Hines PA-C      ascorbic acid  500 mg Oral Daily Queta Smith PA-C      aspirin  81 mg Oral Daily Juliet Hines PA-C      bisacodyl  10 mg Rectal Daily PRN Juliet Hines PA-C      calcium acetate  667 mg Oral TID With Meals Juliet Hines PA-C      calcium carbonate  1,000 mg Oral Daily PRN Juliet Hines PA-C      cefazolin  2,000 mg Intravenous Q8H Wiliam MD Henri 2,000 mg (09/16/20 0837)    dicyclomine  10 mg Oral TID PRN Juliet Hines PA-C      docusate sodium  100 mg Oral BID Juliet Hines PA-C      DULoxetine  60 mg Oral Daily Juliet Hines PA-C      ferrous sulfate  325 mg Oral Daily With Breakfast Amara Browning PA-C      fondaparinux  2 5 mg Subcutaneous Q24H Amara Browning PA-C      furosemide  20 mg Intravenous Daily Queta Smith PA-C      glycopyrrolate  0 2 mg Intravenous Q4H PRN Juliet Hines PA-C      haloperidol lactate  2 mg Intramuscular Q30 Min PRN Juliet Hines PA-C      HYDROmorphone  2 mg Intravenous Q4H PRN Juan R Carpenter PA-C      insulin lispro  1-5 Units Subcutaneous TID ADDIE Judd PA-C      iohexol  50 mL Oral 90 min pre-procedure Juliet Hines PA-C      lidocaine  2 patch Topical Daily Juliet Hines PA-C      LORazepam  1 5 mg Intravenous Q2H PRN Percilla SpruceMILLIE      magnesium oxide  400 mg Oral BID Amara Browning PA-C      melatonin  6 mg Oral HS Adam Glidden, MILLIE      methocarbamol  750 mg Oral Q6H [facility] Amara Browning PA-C      metoprolol tartrate  25 mg Oral Q12H [facility] Amara Browning PA-C      miconazole   Topical BID Sydnee Pride, MILLIE      mirtazapine  30 mg Oral HS Amara Browning PA-C      OLANZapine  5 mg Oral HS Amara Browning PA-C      ondansetron  4 mg Intravenous Q6H PRN Sydnee Pride, MILLIE      oxyCODONE  15 mg Oral Q4H PRN Sydnee Pride, MILLIE      pantoprazole  40 mg Oral Daily Sydnee Pride, MILLIE      polyethylene glycol  17 g Oral Daily Sydnee Pride, MILLIE      potassium chloride  10 mEq Oral Daily Amara Browning PA-C      saccharomyces boulardii  250 mg Oral BID Sydnee Pride, MILLIE      senna  1 tablet Oral BID Sydnee Pride, MILLIE       Continuous Infusions:   PRN Meds: bisacodyl    calcium carbonate    dicyclomine    glycopyrrolate    haloperidol lactate    HYDROmorphone    LORazepam    ondansetron    oxyCODONE    Vitals:   Vitals:    09/15/20 2220 09/16/20 0421 09/16/20 0600 09/16/20 0800   BP: 112/55 92/55  100/62   BP Location: Left arm Right arm  Right arm   Pulse: (!) 109 81     Resp: 18 16     Temp: 98 4 °F (36 9 °C) 98 3 °F (36 8 °C)     TempSrc: Oral Oral     SpO2: 96% 97%  95%   Weight:  72 8 kg (160 lb 7 9 oz) 72 8 kg (160 lb 7 9 oz)    Height:           Telemetry: NSR; Heart Rate: 98    Respiratory:   SpO2: SpO2: 95 %; Room Air    Intake/Output:   I/O       09/13 0701 - 09/14 0700 09/14 0701 - 09/15 0700 09/15 0701 - 09/16 0700    P  O  400 200 120    I V  (mL/kg) 68 2 (0 9) 464 6 (6 5) 21 6 (0 3)    IV Piggyback       Total Intake(mL/kg) 468 2 (6 5) 664 6 (9 2) 141 6 (2)    Urine (mL/kg/hr) 2800 (1 6) 800 (0 5)     Stool  0     Chest Tube       Total Output 2800 800     Net -2331 8 -135 4 +141 6           Unmeasured Urine Occurrence 1 x 4 x     Unmeasured Stool Occurrence  3 x       Net +320cc/24hrs, inaccurate due to unmeasured occurences of UOP    Weights:   Weight (last 2 days)     Date/Time   Weight    09/16/20 0600   72 8 (160 5)    09/16/20 0421   72 8 (160 5)    09/14/20 0600   72 (158 73)            Results:   Results from last 7 days   Lab Units 09/16/20  0432 09/14/20  0853 09/13/20  0417   WBC Thousand/uL 5 92 5 42 6 10   HEMOGLOBIN g/dL 6 9* 7 0* 7 4*   HEMATOCRIT % 23 0* 22 4* 24 7*   PLATELETS Thousands/uL 253 181 170     Results from last 7 days   Lab Units 09/14/20  0438 09/13/20  0417 09/12/20  0605  09/10/20  1131   SODIUM mmol/L 141 140 138   < >  --    POTASSIUM mmol/L 3 5 3 5 3 6   < >  --    CHLORIDE mmol/L 106 105 104   < >  --    CO2 mmol/L 29 28 27   < >  --    CO2, I-STAT mmol/L  --   --   --   --  25   BUN mg/dL 8 7 8   < >  --    CREATININE mg/dL 0 32* 0 33* 0 37*   < >  --    GLUCOSE, ISTAT mg/dl  --   --   --   --  88   CALCIUM mg/dL 8 0* 8 2* 8 3   < >  --     < > = values in this interval not displayed  Results from last 7 days   Lab Units 09/16/20  0432 09/15/20  0503 09/14/20  0438  09/10/20  1838   INR  2 34* 2 71* 1 56*   < > 1 06   PTT seconds  --   --   --   --  33    < > = values in this interval not displayed  9/15 Coumadin 1 mg  9/14 Coumadin 5 mg  9/13 Coumadin 5 mg  9/12 Coumadin 5 mg      Point of care glucose: normal    Invasive Lines/Tubes:  Invasive Devices     Peripherally Inserted Central Catheter Line            PICC Line 08/26/20 20 days                Physical Exam:    HEENT/NECK:  PERRLA  No jugular venous distention  Cardiac: Regular rate and rhythm and No murmurs/rubs/gallops  Pulmonary:  Breath sounds clear bilaterally and No rales/rhonchi/wheezes  Abdomen:  Non-tender, Non-distended and Normal bowel sounds  Incisions: Sternum is stable  Incision is clean, dry, and intact     Extremities: Extremities warm/dry and Trace edema B/L  Neuro: Alert and oriented X 3, Sensation is grossly intact and No focal deficits  Skin: Warm/Dry, without rashes or lesions  Assessment:  Principal Problem:    Bacteremia due to Staphylococcus aureus  Active Problems:    Acute bacterial endocarditis    Septic embolism (HCC)    Bipolar 1 disorder (HCC)    Right hip pain    Intravenous drug abuse ([address] )    DVT of axillary vein, acute left (HCC)    Transaminitis    Rash    Anemia    Hyperphosphatemia    S/P TVR (tricuspid valve repair)       Severe Tricuspid Regurgitation, Tricuspid Valve Endocarditis  S/P tricuspid valve repair; POD # 6    Plan:    1  Cardiac:   NSR; HR/BP well-controlled  Lopressor, 25mg PO BID  Continue ASA   Anticoagulation needed for acute L axillary DVT, INR 2 34, dose Coumadin tonight   PICC line in place for endocarditis antibiotics       2  Pulmonary:   Good Room air oxygen saturation; Continue incentive spirometry/Coughing/Deep breathing exercises  Chest tubes have been discontinued    3  Renal:   Continue diuresis   Post op Creatinine stable; Follow up labs prn  Hyperphosphatemia - (4 9) continue Phoslo 667 mg PO TID and low phos diet    4  Neuro: Neurologically intact, pain management per APS   Ketamine drip discontinued yesterday    5  GI:  Tolerating TLC 2 3 gm sodium and low phos diet  Maintain 1800 mL daily fluid restriction   Continue stool softeners and prn suppository  Continue GI prophylaxis  Chronic HCV infection - monitor LFTs    6  Endo: SSI coverage     7    Hematology:    Post-operative acute blood loss anemia; Hemoglobin 6 9; trend prn     ID:    Recurrent MSSA bacteremia with Tricuspid Valve infective endocarditis                          Continue Ancef at least through 9/26 to complete a 6 week course from the first negative blood culture                          OR cultures from 9/10 show no growth              Right iliacus muscle abscess/sacroiliitis                          Imaging shows abscess is decreasing in size, Inflammatory markers continue to decrease, follow up ESR and CRP per IDs recommendations     8     Disposition: Anticipate discharge to home when antibiotics complete per ID      VTE Pharmacologic Prophylaxis: Warfarin (Coumadin)  VTE Mechanical Prophylaxis: sequential compression device    Collaborative rounds completed with SARAH Márquez    Plan of care discussed with bedside nurse    SIGNATURE: Herminia Lo PA-C  DATE: September 16, 2020  TIME: 8:56 AM

## 2020-09-16 NOTE — PROGRESS NOTES
Progress Note - Infectious Disease   Claude Hernandez 34 y o  female MRN: [id number]  Unit/Bed#: [facility] 421-01 Encounter: [id number]      Impression/Plan:      1  Recurrent MSSA bacteremia with TV infective endocarditis:  Prolonged course of IV antibiotic was previously recommended, but patient has left AMA on 2 prior occasions   She remains hemodynamically stable   Repeat blood culture negative on 08/15/2020; operative culture also negative  -continue cefazolin at least through 9/27/2020 to complete 6 weeks from the 1st negative blood culture  -patient is postop day 6 for tricuspid valve surgery; operative culture negative  Would continue IV antibiotic treatment to finish 6 weeks total through 09/27/2020  -check CBC with diff and CMP weekly while on the IV antibiotics     2  Tricuspid valve endocarditis, 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:  MELISSA done 07/21/2020 showed large vegetation on TV with severe regurgitation  7/15 CT abdomen/pelvis also showed bilateral renal parenchymal abnormalities concerning for septic emboli, patient also had right-sided loculated pleural effusion   No intra-abdominal abscess   Repeat CT chest 09/04/2020 shows resolution of right-sided pleural effusion  Patient is status post repair of tricuspid valve with annuloplasty on 09/10/2020; sternal wound shows no dehiscence, no drainage or erythema  Patient continuously picking on her sternal wound  I strongly advised patient to avoid picking on her wound due to high risk of causing a sternal wound infection  -antibiotic plan as above  -close CT surgery follow-up     3   Right iliacus muscle abscesses/sacroiliitis:  CT scan of right lower extremity on 08/21/2020 showed 9 mm iliacus muscle collection, decreasing in size   Follow-up imaging with MRI done 08/31/2020 with evidence of sacroiliitis that is probably septic   There are no destructive changes or fluid collection   Suspect this is the cause of the patient's ongoing pain   Inflammatory markers continue to decrease, ESR now normal; CRP up to 113, though this is possibly secondary to her recent sternotomy and valvular surgery  -antibiotics as above  -will repeat ESR and CRP 09/21/2020;  if inflammatory markers have not returned to normal before end of IV antibiotic treatment, would consider additional course of p o  Antibiotics   -pain management        4  Back pain:    improving, more likely secondary to chronic pain, opioid dependence   8/1/20 [facility] were for abscess or osteomyelitis   CT scan nondiagnostic for osteomyelitis or diskitis   Sedimentation rate and CRP have significantly decreased, though now again elevated likely due to recent surgery  Patient has been ambulating without assistance, with no signs of pain and no limping  -monitor back pain   -serial exams  -acute pain service follow-up  -recheck CRP and ESR early next week as mentioned above     5  Active IV heroin abuse:   This is the causative factor for development of bacteremia and infective endocarditis   Patient has left AMA on prior occasions   HIV screen negative  -patient is not a candidate for at home PICC line/IV antibiotics  -acute pain service follow-up     6   Chronic HCV infection:   Recent HIV was negative   The LFTs are waxing waning  Review of Care everywhere shows high hepatitis C viral load 01/2020   -monitor LFTs   - outpatient follow-up with GI     7  Left upper extremity DVT:  in the setting of PICC line use   Patient now on anticoagulation  -anticoagulation as per primary and vascular team        Discussed the above management plan with the primary service  Plan mentioned above discussed with the patient     Antibiotics:  Cefazolin restart 37  Negative blood cultures 29  Postop day 5    Subjective:  Denies fever or chills  Denies nausea, vomiting, diarrhea or abdominal pain  Denies dysuria  Complaining of sternal wound pain and itching  Reports improvement in her right hip pain    Objective:  Vitals:  Temp:  [97 4 °F (36 3 °C)-98 4 °F (36 9 °C)] 98 3 °F (36 8 °C)  HR:  [] 81  Resp:  [16-18] 16  BP: ()/(46-62) 100/62  SpO2:  [95 %-97 %] 95 %  Temp (24hrs), Av °F (36 7 °C), Min:97 4 °F (36 3 °C), Max:98 4 °F (36 9 °C)  Current: Temperature: 98 3 °F (36 8 °C)    Physical Exam:   General Appearance:  Alert, interactive, nontoxic, no acute distress  Throat: Oropharynx moist without lesions  Lungs:   Clear to auscultation bilaterally; no wheezes, rhonchi or rales; respirations unlabored   Heart:  RRR; no murmur, rub or gallop   Abdomen:   Soft, non-tender, non-distended, positive bowel sounds  Extremities: No clubbing, cyanosis or edema  Sternal wound with no surrounding erythema, no dehiscence, no purulence  Able to move right hip in all directions with no limited range of motions, seen ambulating with no limping   Skin: No new rashes or lesions          Labs, Imaging, & Other studies:   All pertinent labs and imaging studies were personally reviewed  Results from last 7 days   Lab Units 20  0432 20  0853 20  0417   WBC Thousand/uL 5 92 5 42 6 10   HEMOGLOBIN g/dL 6 9* 7 0* 7 4*   PLATELETS Thousands/uL 253 181 170     Results from last 7 days   Lab Units 20  0438 20  0437 20  0417 20  0605  09/10/20  1131  09/10/20  0449   SODIUM mmol/L 141  --  140 138   < >  --   --  138   POTASSIUM mmol/L 3 5  --  3 5 3 6   < >  --   --  4 1   CHLORIDE mmol/L 106  --  105 104   < >  --   --  105   CO2 mmol/L 29  --  28 27   < >  --   --  29   CO2, I-STAT mmol/L  --   --   --   --   --  25   < >  --    BUN mg/dL 8  --  7 8   < >  --   --  17   CREATININE mg/dL 0 32*  --  0 33* 0 37*   < >  --   --  0 47*   EGFR ml/min/1 73sq m 154  --  153 147   < >  --   --  136   GLUCOSE, ISTAT mg/dl  --   --   --   --   --  88   < >  --    CALCIUM mg/dL 8 0*  --  8 2* 8 3   < >  --   --  8 8   AST U/L  -- 19  --   --   --   --   --  47*   ALT U/L  --  14  --   --   --   --   --  28   ALK PHOS U/L  --  128*  --   --   --   --   --  174*    < > = values in this interval not displayed       Results from last 7 days   Lab Units 09/10/20  0922   GRAM STAIN RESULT  2+ Polys  No organisms seen         Results from last 7 days   Lab Units 09/14/20  0437   CRP mg/L 113 0*     Results from last 7 days   Lab Units 09/10/20  0449   FERRITIN ng/mL 71

## 2020-09-17 PROBLEM — Z3A.40 40 WEEKS GESTATION OF PREGNANCY: Status: RESOLVED | Noted: 2017-01-31 | Resolved: 2020-09-17

## 2020-09-17 LAB
ANION GAP SERPL CALCULATED.3IONS-SCNC: 7 MMOL/L (ref 4–13)
BUN SERPL-MCNC: 9 MG/DL (ref 5–25)
CALCIUM SERPL-MCNC: 8.6 MG/DL (ref 8.3–10.1)
CHLORIDE SERPL-SCNC: 106 MMOL/L (ref 100–108)
CO2 SERPL-SCNC: 28 MMOL/L (ref 21–32)
CREAT SERPL-MCNC: 0.43 MG/DL (ref 0.6–1.3)
GFR SERPL CREATININE-BSD FRML MDRD: 140 ML/MIN/1.73SQ M
GLUCOSE SERPL-MCNC: 101 MG/DL (ref 65–140)
GLUCOSE SERPL-MCNC: 105 MG/DL (ref 65–140)
GLUCOSE SERPL-MCNC: 106 MG/DL (ref 65–140)
GLUCOSE SERPL-MCNC: 133 MG/DL (ref 65–140)
GLUCOSE SERPL-MCNC: 90 MG/DL (ref 65–140)
HCT VFR BLD AUTO: 24.8 % (ref 34.8–46.1)
HGB BLD-MCNC: 7.8 G/DL (ref 11.5–15.4)
INR PPP: 1.91 (ref 0.84–1.19)
POTASSIUM SERPL-SCNC: 4.2 MMOL/L (ref 3.5–5.3)
PROTHROMBIN TIME: 21.8 SECONDS (ref 11.6–14.5)
SODIUM SERPL-SCNC: 141 MMOL/L (ref 136–145)

## 2020-09-17 PROCEDURE — 85018 HEMOGLOBIN: CPT | Performed by: PHYSICIAN ASSISTANT

## 2020-09-17 PROCEDURE — 85014 HEMATOCRIT: CPT | Performed by: PHYSICIAN ASSISTANT

## 2020-09-17 PROCEDURE — 97116 GAIT TRAINING THERAPY: CPT

## 2020-09-17 PROCEDURE — 99024 POSTOP FOLLOW-UP VISIT: CPT | Performed by: PHYSICIAN ASSISTANT

## 2020-09-17 PROCEDURE — 80048 BASIC METABOLIC PNL TOTAL CA: CPT | Performed by: PHYSICIAN ASSISTANT

## 2020-09-17 PROCEDURE — 85610 PROTHROMBIN TIME: CPT | Performed by: PHYSICIAN ASSISTANT

## 2020-09-17 PROCEDURE — 99232 SBSQ HOSP IP/OBS MODERATE 35: CPT | Performed by: PHYSICIAN ASSISTANT

## 2020-09-17 PROCEDURE — 99232 SBSQ HOSP IP/OBS MODERATE 35: CPT | Performed by: INTERNAL MEDICINE

## 2020-09-17 PROCEDURE — 82948 REAGENT STRIP/BLOOD GLUCOSE: CPT

## 2020-09-17 RX ORDER — ERGOCALCIFEROL 1.25 MG/1
50000 CAPSULE ORAL 3 TIMES WEEKLY
Status: DISCONTINUED | OUTPATIENT
Start: 2020-09-17 | End: 2020-09-28 | Stop reason: HOSPADM

## 2020-09-17 RX ORDER — LORAZEPAM 2 MG/ML
1 INJECTION INTRAMUSCULAR EVERY 2 HOUR PRN
Status: DISCONTINUED | OUTPATIENT
Start: 2020-09-17 | End: 2020-09-23

## 2020-09-17 RX ORDER — WARFARIN SODIUM 1 MG/1
2 TABLET ORAL
Status: COMPLETED | OUTPATIENT
Start: 2020-09-17 | End: 2020-09-17

## 2020-09-17 RX ADMIN — FUROSEMIDE 20 MG: 10 INJECTION, SOLUTION INTRAMUSCULAR; INTRAVENOUS at 08:39

## 2020-09-17 RX ADMIN — OXYCODONE HYDROCHLORIDE 15 MG: 10 TABLET ORAL at 08:54

## 2020-09-17 RX ADMIN — METOPROLOL TARTRATE 25 MG: 25 TABLET, FILM COATED ORAL at 08:34

## 2020-09-17 RX ADMIN — OXYCODONE HYDROCHLORIDE 15 MG: 10 TABLET ORAL at 17:34

## 2020-09-17 RX ADMIN — CALCIUM ACETATE 667 MG: 667 CAPSULE ORAL at 11:06

## 2020-09-17 RX ADMIN — ASPIRIN 81 MG CHEWABLE TABLET 81 MG: 81 TABLET CHEWABLE at 08:35

## 2020-09-17 RX ADMIN — FONDAPARINUX SODIUM 2.5 MG: 2.5 INJECTION, SOLUTION SUBCUTANEOUS at 11:07

## 2020-09-17 RX ADMIN — WARFARIN SODIUM 2 MG: 1 TABLET ORAL at 17:35

## 2020-09-17 RX ADMIN — LORAZEPAM 1.5 MG: 2 INJECTION INTRAMUSCULAR; INTRAVENOUS at 06:02

## 2020-09-17 RX ADMIN — AMIODARONE HYDROCHLORIDE 200 MG: 200 TABLET ORAL at 05:12

## 2020-09-17 RX ADMIN — OLANZAPINE 5 MG: 5 TABLET, FILM COATED ORAL at 20:55

## 2020-09-17 RX ADMIN — METHOCARBAMOL TABLETS 750 MG: 750 TABLET, COATED ORAL at 11:06

## 2020-09-17 RX ADMIN — SENNOSIDES 8.6 MG: 8.6 TABLET ORAL at 17:35

## 2020-09-17 RX ADMIN — CEFAZOLIN SODIUM 2000 MG: 2 SOLUTION INTRAVENOUS at 17:41

## 2020-09-17 RX ADMIN — PANTOPRAZOLE SODIUM 40 MG: 40 TABLET, DELAYED RELEASE ORAL at 08:34

## 2020-09-17 RX ADMIN — OXYCODONE HYDROCHLORIDE AND ACETAMINOPHEN 500 MG: 500 TABLET ORAL at 08:35

## 2020-09-17 RX ADMIN — OXYCODONE HYDROCHLORIDE 15 MG: 10 TABLET ORAL at 04:41

## 2020-09-17 RX ADMIN — LORAZEPAM 1 MG: 2 INJECTION INTRAMUSCULAR; INTRAVENOUS at 20:53

## 2020-09-17 RX ADMIN — Medication 250 MG: at 08:34

## 2020-09-17 RX ADMIN — FERROUS SULFATE TAB 325 MG (65 MG ELEMENTAL FE) 325 MG: 325 (65 FE) TAB at 08:34

## 2020-09-17 RX ADMIN — POTASSIUM CHLORIDE 10 MEQ: 750 TABLET, EXTENDED RELEASE ORAL at 08:35

## 2020-09-17 RX ADMIN — POLYETHYLENE GLYCOL 3350 17 G: 17 POWDER, FOR SOLUTION ORAL at 08:35

## 2020-09-17 RX ADMIN — LORAZEPAM 1.5 MG: 2 INJECTION INTRAMUSCULAR; INTRAVENOUS at 11:07

## 2020-09-17 RX ADMIN — MAGNESIUM OXIDE TAB 400 MG (241.3 MG ELEMENTAL MG) 400 MG: 400 (241.3 MG) TAB at 08:35

## 2020-09-17 RX ADMIN — HYDROMORPHONE HYDROCHLORIDE 2 MG: 1 INJECTION, SOLUTION INTRAMUSCULAR; INTRAVENOUS; SUBCUTANEOUS at 05:12

## 2020-09-17 RX ADMIN — SENNOSIDES 8.6 MG: 8.6 TABLET ORAL at 08:34

## 2020-09-17 RX ADMIN — ACETAMINOPHEN 975 MG: 325 TABLET, FILM COATED ORAL at 11:06

## 2020-09-17 RX ADMIN — CEFAZOLIN SODIUM 2000 MG: 2 SOLUTION INTRAVENOUS at 00:00

## 2020-09-17 RX ADMIN — DULOXETINE HYDROCHLORIDE 60 MG: 60 CAPSULE, DELAYED RELEASE ORAL at 08:35

## 2020-09-17 RX ADMIN — Medication 250 MG: at 17:34

## 2020-09-17 RX ADMIN — METHOCARBAMOL TABLETS 750 MG: 750 TABLET, COATED ORAL at 05:12

## 2020-09-17 RX ADMIN — CALCIUM ACETATE 667 MG: 667 CAPSULE ORAL at 08:35

## 2020-09-17 RX ADMIN — DOCUSATE SODIUM 100 MG: 100 CAPSULE, LIQUID FILLED ORAL at 08:35

## 2020-09-17 RX ADMIN — METHOCARBAMOL TABLETS 750 MG: 750 TABLET, COATED ORAL at 17:34

## 2020-09-17 RX ADMIN — MIRTAZAPINE 30 MG: 30 TABLET, FILM COATED ORAL at 20:55

## 2020-09-17 RX ADMIN — HYDROMORPHONE HYDROCHLORIDE 2 MG: 1 INJECTION, SOLUTION INTRAMUSCULAR; INTRAVENOUS; SUBCUTANEOUS at 18:46

## 2020-09-17 RX ADMIN — CEFAZOLIN SODIUM 2000 MG: 2 SOLUTION INTRAVENOUS at 08:35

## 2020-09-17 RX ADMIN — CALCIUM ACETATE 667 MG: 667 CAPSULE ORAL at 17:34

## 2020-09-17 RX ADMIN — Medication 12.5 MG: at 20:55

## 2020-09-17 RX ADMIN — OXYCODONE HYDROCHLORIDE 15 MG: 10 TABLET ORAL at 23:42

## 2020-09-17 RX ADMIN — AMIODARONE HYDROCHLORIDE 200 MG: 200 TABLET ORAL at 17:35

## 2020-09-17 RX ADMIN — ACETAMINOPHEN 975 MG: 325 TABLET, FILM COATED ORAL at 18:46

## 2020-09-17 RX ADMIN — MAGNESIUM OXIDE TAB 400 MG (241.3 MG ELEMENTAL MG) 400 MG: 400 (241.3 MG) TAB at 17:34

## 2020-09-17 RX ADMIN — CEFAZOLIN SODIUM 2000 MG: 2 SOLUTION INTRAVENOUS at 23:39

## 2020-09-17 RX ADMIN — HYDROMORPHONE HYDROCHLORIDE 2 MG: 1 INJECTION, SOLUTION INTRAMUSCULAR; INTRAVENOUS; SUBCUTANEOUS at 10:01

## 2020-09-17 RX ADMIN — AMIODARONE HYDROCHLORIDE 200 MG: 200 TABLET ORAL at 21:00

## 2020-09-17 RX ADMIN — ERGOCALCIFEROL 50000 UNITS: 1.25 CAPSULE ORAL at 11:08

## 2020-09-17 RX ADMIN — MELATONIN 6 MG: at 20:55

## 2020-09-17 NOTE — PROGRESS NOTES
Progress Note - Infectious Disease   Narendra Genao 34 y o  female MRN: [id number]  Unit/Bed#: [facility] 421-01 Encounter: [id number]      Impression/Plan:    1  Recurrent MSSA bacteremia with TV infective endocarditis:  Prolonged course of IV antibiotic was previously recommended, but patient has left AMA on 2 prior occasions   She remains hemodynamically stable   Repeat blood culture negative on 08/15/2020; operative culture also negative  -continue cefazolin at least through 9/27/2020 to complete 6 weeks from the 1st negative blood culture  -patient is postop day 7 for tricuspid valve surgery; operative culture negative  Would continue IV antibiotic treatment to finish 6 weeks total through 09/27/2020  -check CBC with diff and CMP weekly while on the IV antibiotics     2  Tricuspid valve endocarditis, 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:  MELISSA done 07/21/2020 showed large vegetation on TV with severe regurgitation  7/15 CT abdomen/pelvis also showed bilateral renal parenchymal abnormalities concerning for septic emboli, patient also had right-sided loculated pleural effusion   No intra-abdominal abscess   Repeat CT chest 09/04/2020 shows resolution of right-sided pleural effusion  Patient is status post repair of tricuspid valve with annuloplasty on 09/10/2020; sternal wound shows no dehiscence, no drainage or erythema   -antibiotic plan as above  -close CT surgery follow-up     3   Right iliacus muscle abscesses/sacroiliitis:  CT scan of right lower extremity on 08/21/2020 showed 9 mm iliacus muscle collection, decreasing in size   Follow-up imaging with MRI done 08/31/2020 with evidence of sacroiliitis that is probably septic   There are no destructive changes or fluid collection   Suspect this is the cause of the patient's ongoing pain   Inflammatory markers continue to decrease, ESR now normal; CRP up to 113, though this is possibly secondary to her recent sternotomy and valvular surgery  -antibiotics as above  -will repeat ESR and CRP 09/21/2020;  if inflammatory markers have not returned to normal before end of IV antibiotic treatment, would consider additional course of p o  Antibiotics   -pain management        4  Back pain:   Improving, more likely secondary to chronic pain, opioid dependence   8/1/20 Th[facility]acic and [facility] were for abscess or osteomyelitis   CT scan nondiagnostic for osteomyelitis or diskitis   Sedimentation rate and CRP have significantly decreased, though now again elevated likely due to recent surgery  Patient has been ambulating without assistance, with no signs of pain and no limping  -monitor back pain   -serial exams  -acute pain service follow-up  -recheck CRP and ESR early next week as mentioned above     5  Active IV heroin abuse:   This is the causative factor for development of bacteremia and infective endocarditis   Patient has left AMA on prior occasions   HIV screen negative  -patient is not a candidate for at home PICC line/IV antibiotics  -acute pain service follow-up     6   Chronic HCV infection:   Recent HIV was negative   The LFTs are waxing waning  Review of Care everywhere shows high hepatitis C viral load 01/2020   -monitor LFTs   - outpatient follow-up with GI     7  Left upper extremity DVT:  in the setting of PICC line use   Patient now on anticoagulation  -anticoagulation as per primary and vascular team        Discussed the above management plan with the primary service  Plan mentioned above discussed with the patient     Antibiotics:  Cefazolin restart 38  Negative blood cultures 30  Postop day 6    Subjective:  Patient has no fever, chills, sweats; no nausea, vomiting, diarrhea; no cough, shortness of breath; complaining of sternal wound pain and itching    Objective:  Vitals:  Temp:  [98 °F (36 7 °C)-98 2 °F (36 8 °C)] 98 2 °F (36 8 °C)  HR:  [68-99] 79  Resp:  [18] 18  BP: ()/(43-65) 106/65  SpO2:  [95 %-98 %] 95 %  Temp (24hrs), Av 1 °F (36 7 °C), Min:98 °F (36 7 °C), Max:98 2 °F (36 8 °C)  Current: Temperature: 98 2 °F (36 8 °C)    Physical Exam:   General Appearance:  Alert, interactive, nontoxic, no acute distress  Throat: Oropharynx moist without lesions  Lungs:   Clear to auscultation bilaterally; no wheezes, rhonchi or rales; respirations unlabored   Heart:  RRR; no murmur, rub or gallop   Abdomen:   Soft, non-tender, non-distended, positive bowel sounds  Extremities: No clubbing, cyanosis or edema   Skin: No new rashes or lesions  No draining wounds noted    Sternal wound with no dehiscence, no surrounding erythema, no purulence       Labs, Imaging, & Other studies:   All pertinent labs and imaging studies were personally reviewed  Results from last 7 days   Lab Units 20  0520 20  1509 20  0432 20  0853 20  0417   WBC Thousand/uL  --   --  5 92 5 42 6 10   HEMOGLOBIN g/dL 7 8* 8 1* 6 9* 7 0* 7 4*   PLATELETS Thousands/uL  --   --  253 181 170     Results from last 7 days   Lab Units 20  0520 20  0438 20  0437 20  0417   SODIUM mmol/L 141 141  --  140   POTASSIUM mmol/L 4 2 3 5  --  3 5   CHLORIDE mmol/L 106 106  --  105   CO2 mmol/L 28 29  --  28   BUN mg/dL 9 8  --  7   CREATININE mg/dL 0 43* 0 32*  --  0 33*   EGFR ml/min/1 73sq m 140 154  --  153   CALCIUM mg/dL 8 6 8 0*  --  8 2*   AST U/L  --   --  19  --    ALT U/L  --   --  14  --    ALK PHOS U/L  --   --  128*  --              Results from last 7 days   Lab Units 20  0437   CRP mg/L 113 0*

## 2020-09-17 NOTE — PROGRESS NOTES
Progress Note - Cardiothoracic Surgery   Betty Awan 34 y o  female MRN: [id number]  Unit/Bed#: [facility] 421-01 Encounter: [id number]  Severe Tricuspid Regurgitation, Tricuspid Valve Endocarditis  S/P tricuspid valve repair; POD # 7    24 Hour Events: Hypotensive last evening (80/43); improved this morning (102/57)  Complains of incisional chest pain, right buttock pain and left wrist pain  Requesting Dilaudid q4 hrs scheduled instead of PRN      Medications:   Scheduled Meds:  Current Facility-Administered Medications   Medication Dose Route Frequency Provider Last Rate    acetaminophen  975 mg Oral Q8H Tomas Higginbotham PA-C      amiodarone  200 mg Oral Q8H [address] Tomas Higginbotham PA-C      ascorbic acid  500 mg Oral Daily Debbie Mosqueda PA-C      aspirin  81 mg Oral Daily Tomas Higginbotham PA-C      bisacodyl  10 mg Rectal Daily PRN Tomas Higginbotham PA-C      calcium acetate  667 mg Oral TID With Meals Tomas Higginbotham PA-C      calcium carbonate  1,000 mg Oral Daily PRN Tomas Higginbotham PA-C      cefazolin  2,000 mg Intravenous Q8H Wiliam ZeinedMD ashish 2,000 mg (09/17/20 0000)    dicyclomine  10 mg Oral TID PRN Tomas Higginbotham PA-C      docusate sodium  100 mg Oral BID Tomas Higginbotham PA-C      DULoxetine  60 mg Oral Daily Tomas Higginbotham PA-C      ferrous sulfate  325 mg Oral Daily With Breakfast Amara Browning PA-C      fondaparinux  2 5 mg Subcutaneous Q24H Amara Brownnig PA-C      furosemide  20 mg Intravenous Daily Debbie Mosqueda PA-C      glycopyrrolate  0 2 mg Intravenous Q4H PRN Tomas Higginbotham PA-C      haloperidol lactate  2 mg Intramuscular Q30 Min PRN Tomas Higginbotham PA-C      HYDROmorphone  2 mg Intravenous Q4H PRN Lexi See PA-C      insulin lispro  1-5 Units Subcutaneous TID AC Deisy Judd PA-C      iohexol  50 mL Oral 90 min pre-procedure Tomas Higginbotham PA-C      lidocaine  2 patch Topical Daily Tomas Higginbotham PA-C      LORazepam  1 5 mg Intravenous Q2H PRN Pia Angel, MILLIE      magnesium oxide  400 mg Oral BID Amara KESHAV Nam, MILLIE      melatonin  6 mg Oral HS Amara KESHAV MILLIE Browning      methocarbamol  750 mg Oral Q6H [address] Amara Browning, MILLIE      metoprolol tartrate  25 mg Oral Q12H [address] Amara Browning, MILLIE      miconazole   Topical BID Fabiene Budge, MILLEI      mirtazapine  30 mg Oral HS Amara Browning, MILLIE      OLANZapine  5 mg Oral HS Amara Browning, MILLIE      ondansetron  4 mg Intravenous Q6H PRN Fabiene Budge, MILLIE      oxyCODONE  15 mg Oral Q4H PRN Fabiene Budge, MILLIE      pantoprazole  40 mg Oral Daily Fabiene Budge, MILLIE      polyethylene glycol  17 g Oral Daily Fabiene Budge, MILLIE      potassium chloride  10 mEq Oral Daily Amara Browning PA-C      saccharomyces boulardii  250 mg Oral BID Fabiene Budge, MILLIE      senna  1 tablet Oral BID Fabiene Budge, MILLIE       Continuous Infusions:   PRN Meds: bisacodyl    calcium carbonate    dicyclomine    glycopyrrolate    haloperidol lactate    HYDROmorphone    LORazepam    ondansetron    oxyCODONE    Vitals:   Vitals:    09/16/20 2246 09/17/20 0512 09/17/20 0600 09/17/20 0700   BP: (!) 80/43 100/55  102/57   BP Location: Right arm   Right arm   Pulse: 68 73  72   Resp: 18   18   Temp: 98 2 °F (36 8 °C)   98 2 °F (36 8 °C)   TempSrc: Oral   Oral   SpO2: 98%   96%   Weight:   70 9 kg (156 lb 4 9 oz)    Height:           Telemetry: NSR; Heart Rate: 98    Respiratory:   SpO2: SpO2: 96 %; Room Air    Intake/Output: -857ml/24 hrs, but no UOP or intake documented for evening/nights  I/O       09/13 0701 - 09/14 0700 09/14 0701 - 09/15 0700 09/15 0701 - 09/16 0700    P  O  400 200 120    I V  (mL/kg) 68 2 (0 9) 464 6 (6 5) 21 6 (0 3)    IV Piggyback       Total Intake(mL/kg) 468 2 (6 5) 664 6 (9 2) 141 6 (2)    Urine (mL/kg/hr) 2800 (1 6) 800 (0 5)     Stool  0     Chest Tube       Total Output 2800 800     Net -2331 8 -135 4 +141 6 Unmeasured Urine Occurrence 1 x 4 x     Unmeasured Stool Occurrence  3 x           Weights:   Weight (last 2 days)     Date/Time   Weight    09/17/20 0600   70 9 (156 31)    09/16/20 0600   72 8 (160 5)    09/16/20 0421   72 8 (160 5)            Results:   Results from last 7 days   Lab Units 09/17/20  0520 09/16/20  1509 09/16/20  0432 09/14/20  0853 09/13/20  0417   WBC Thousand/uL  --   --  5 92 5 42 6 10   HEMOGLOBIN g/dL 7 8* 8 1* 6 9* 7 0* 7 4*   HEMATOCRIT % 24 8* 26 0* 23 0* 22 4* 24 7*   PLATELETS Thousands/uL  --   --  253 181 170     Results from last 7 days   Lab Units 09/17/20  0520 09/14/20  0438 09/13/20  0417  09/10/20  1131   SODIUM mmol/L 141 141 140   < >  --    POTASSIUM mmol/L 4 2 3 5 3 5   < >  --    CHLORIDE mmol/L 106 106 105   < >  --    CO2 mmol/L 28 29 28   < >  --    CO2, I-STAT mmol/L  --   --   --   --  25   BUN mg/dL 9 8 7   < >  --    CREATININE mg/dL 0 43* 0 32* 0 33*   < >  --    GLUCOSE, ISTAT mg/dl  --   --   --   --  88   CALCIUM mg/dL 8 6 8 0* 8 2*   < >  --     < > = values in this interval not displayed  Results from last 7 days   Lab Units 09/17/20  0520 09/16/20  0432 09/15/20  0503  09/10/20  1838   INR  1 91* 2 34* 2 71*   < > 1 06   PTT seconds  --   --   --   --  33    < > = values in this interval not displayed  9/16 Coumadin 2mg   9/15 Coumadin 1 mg  9/14 Coumadin 5 mg  9/13 Coumadin 5 mg  9/12 Coumadin 5 mg      Point of care glucose: BS 90 - 133  No SSIC/24 hrs    Invasive Lines/Tubes:  Invasive Devices     Peripherally Inserted Central Catheter Line            PICC Line 08/26/20 21 days                Physical Exam:    HEENT/NECK:  PERRLA  No jugular venous distention  Cardiac: Regular rate and rhythm and No murmurs/rubs/gallops  Pulmonary:  Breath sounds clear bilaterally and No rales/rhonchi/wheezes  Abdomen:  Non-tender, Non-distended and Normal bowel sounds  Incisions: Sternum is stable  Incision is clean, dry, and intact     Extremities: Extremities warm/dry and Trace edema B/L  Neuro: Alert and oriented X 3, Sensation is grossly intact and No focal deficits  Skin: Warm/Dry, without rashes or lesions  Assessment:  Principal Problem:    Bacteremia due to Staphylococcus aureus  Active Problems:    Acute bacterial endocarditis    Septic embolism (HCC)    Bipolar 1 disorder (HCC)    Right hip pain    Intravenous drug abuse ([address] )    DVT of axillary vein, acute left (HCC)    Transaminitis    Rash    Anemia    Hyperphosphatemia    S/P TVR (tricuspid valve repair)       Severe Tricuspid Regurgitation, Tricuspid Valve Endocarditis  S/P tricuspid valve repair; POD # 7    Plan:    1  Cardiac:   NSR; HR/BP well-controlled currently  Episodes of hypotension  Decrease Lopressor 12 5mg PO BID  Continue ASA   Anticoagulation needed for acute L axillary DVT, INR 1 91, dose Coumadin 2mg tonight   PICC line in place for endocarditis antibiotics       2  Pulmonary:   Good Room air oxygen saturation; Continue incentive spirometry/Coughing/Deep breathing exercises  Chest tubes have been discontinued    3  Renal:   Continue diuresis   Post op Creatinine stable; Follow up labs prn  Hyperphosphatemia - (4 9) continue Phoslo 667 mg PO TID and low phos diet    4  Neuro: Neurologically intact, pain management per APS   Ketamine drip discontinued    5  GI:  Tolerating TLC 2 3 gm sodium and low phos diet  Maintain 1800 mL daily fluid restriction   Continue stool softeners and prn suppository  Continue GI prophylaxis  Chronic HCV infection - monitor LFTs    6  Endo: SSI coverage     7    Hematology:    Post-operative acute blood loss anemia;  Hemoglobin 7 8; trend prn   Vitamin D Deficiency - start PO supplementation    ID:    Recurrent MSSA bacteremia with Tricuspid Valve infective endocarditis                          Continue Ancef at least through 9/26 to complete a 6 week course from the first negative blood culture                          OR cultures from 9/10 show no growth Right iliacus muscle abscess/sacroiliitis                          Imaging shows abscess is decreasing in size, Inflammatory markers continue to decrease, follow up ESR and CRP per IDs recommendations     8  Disposition:      Anticipate discharge to home when antibiotics complete per ID      VTE Pharmacologic Prophylaxis: Warfarin (Coumadin)  VTE Mechanical Prophylaxis: sequential compression device    Collaborative rounds completed with SARAH Darling    Plan of care discussed with bedside nurse    SIGNATURE: Violeta Cancino PA-C  DATE: September 17, 2020  TIME: 8:30 AM

## 2020-09-17 NOTE — UTILIZATION REVIEW
Continued Stay Review    Date: 09/17/2020                      Severe Tricuspid Regurgitation, Tricuspid Valve Endocarditis  S/P tricuspid valve repair; POD # 7  Current Patient Class: Inpatient  Current Level of Care: Med/Surg    HPI:29 y o  female initially admitted on 08/12/2020  Bacteremia due to staphylococcus Aureus  Assessment/Plan: Anticoagulation needed for acute L axillary DVT, INR 1 91, dose Coumadin 2mg tonight  PICC continue IV Abx    Continue diuresis  VTE Pharmacologic Prophylaxis: Warfarin (Coumadin)  VTE Mechanical Prophylaxis: sequential compression device  Pertinent Labs/Diagnostic Results:     Results from last 7 days   Lab Units 09/17/20  0520 09/16/20  1509 09/16/20  0432 09/14/20  0853 09/13/20  0417   WBC Thousand/uL  --   --  5 92 5 42 6 10   HEMOGLOBIN g/dL 7 8* 8 1* 6 9* 7 0* 7 4*   HEMATOCRIT % 24 8* 26 0* 23 0* 22 4* 24 7*   PLATELETS Thousands/uL  --   --  253 181 170   NEUTROS ABS Thousands/µL  --   --  2 73  --   --      Results from last 7 days   Lab Units 09/17/20  0520 09/15/20  0503 09/14/20  0855 09/14/20  0438 09/14/20  0437 09/13/20  0417 09/12/20  0605 09/11/20  0410 09/11/20  0321   SODIUM mmol/L 141  --   --  141  --  140 138  --  136   POTASSIUM mmol/L 4 2  --   --  3 5  --  3 5 3 6  --  4 1   CHLORIDE mmol/L 106  --   --  106  --  105 104  --  104   CO2 mmol/L 28  --   --  29  --  28 27  --  26   ANION GAP mmol/L 7  --   --  6  --  7 7  --  6   BUN mg/dL 9  --   --  8  --  7 8  --  13   CREATININE mg/dL 0 43*  --   --  0 32*  --  0 33* 0 37*  --  0 36*   EGFR ml/min/1 73sq m 140  --   --  154  --  153 147  --  148   CALCIUM mg/dL 8 6  --   --  8 0*  --  8 2* 8 3  --  8 1*   MAGNESIUM mg/dL  --   --   --   --   --   --  1 8  --  2 2   PHOSPHORUS mg/dL  --  4 9* 5 7*  --  4 9*  --  3 4 4 9*  --      Results from last 7 days   Lab Units 09/14/20  0437   AST U/L 19   ALT U/L 14   ALK PHOS U/L 128*   TOTAL PROTEIN g/dL 6 1*   ALBUMIN g/dL 2 4*   TOTAL BILIRUBIN mg/dL 0 21 BILIRUBIN DIRECT mg/dL 0 09     Results from last 7 days   Lab Units 09/17/20  1038 09/17/20  0605 09/16/20  2044 09/16/20  1451 09/16/20  1103 09/16/20  0547 09/15/20  2057 09/15/20  1608 09/15/20  1207 09/15/20  0622 09/14/20  2050 09/14/20  1557   POC GLUCOSE mg/dl 101 133 130 98 103 91 118 104 182* 97 111 101     Results from last 7 days   Lab Units 09/17/20  0520 09/14/20  0438 09/13/20  0417 09/12/20  0605 09/11/20  0321   GLUCOSE RANDOM mg/dL 90 98 102 122 98     Results from last 7 days   Lab Units 09/17/20  0520 09/16/20  0432 09/15/20  0503  09/10/20  1838   PROTIME seconds 21 8* 25 5* 28 6*   < > 13 8   INR  1 91* 2 34* 2 71*   < > 1 06   PTT seconds  --   --   --   --  33    < > = values in this interval not displayed       Results from last 7 days   Lab Units 09/14/20  0437   CRP mg/L 113 0*     Vital Signs: /65 (BP Location: Right arm)   Pulse 79   Temp 98 2 °F (36 8 °C) (Oral)   Resp 18   Ht 5' 5" (1 651 m)   Wt 70 9 kg (156 lb 4 9 oz)   SpO2 95%   Breastfeeding No   BMI 26 01 kg/m²       Medications:   Scheduled Medications:  acetaminophen, 975 mg, Oral, Q8H  amiodarone, 200 mg, Oral, Q8H JEFF  ascorbic acid, 500 mg, Oral, Daily  aspirin, 81 mg, Oral, Daily  calcium acetate, 667 mg, Oral, TID With Meals  cefazolin, 2,000 mg, Intravenous, Q8H  docusate sodium, 100 mg, Oral, BID  DULoxetine, 60 mg, Oral, Daily  ergocalciferol, 50,000 Units, Oral, Once per day on Mon Wed Fri  ferrous sulfate, 325 mg, Oral, Daily With Breakfast  fondaparinux, 2 5 mg, Subcutaneous, Q24H  furosemide, 20 mg, Intravenous, Daily  insulin lispro, 1-5 Units, Subcutaneous, TID AC  iohexol, 50 mL, Oral, 90 min pre-procedure  lidocaine, 2 patch, Topical, Daily  magnesium oxide, 400 mg, Oral, BID  melatonin, 6 mg, Oral, HS  methocarbamol, 750 mg, Oral, Q6H JEFF  metoprolol tartrate, 12 5 mg, Oral, Q12H JEFF  miconazole, , Topical, BID  mirtazapine, 30 mg, Oral, HS  OLANZapine, 5 mg, Oral, HS  pantoprazole, 40 mg, Oral, Daily  polyethylene glycol, 17 g, Oral, Daily  potassium chloride, 10 mEq, Oral, Daily  saccharomyces boulardii, 250 mg, Oral, BID  senna, 1 tablet, Oral, BID  warfarin, 2 mg, Oral, Once (warfarin)      Continuous IV Infusions:     PRN Meds:  bisacodyl, 10 mg, Rectal, Daily PRN  calcium carbonate, 1,000 mg, Oral, Daily PRN  dicyclomine, 10 mg, Oral, TID PRN  glycopyrrolate, 0 2 mg, Intravenous, Q4H PRN  haloperidol lactate, 2 mg, Intramuscular, Q30 Min PRN  HYDROmorphone, 2 mg, Intravenous, Q4H PRN  LORazepam, 1 mg, Intravenous, Q2H PRN  ondansetron, 4 mg, Intravenous, Q6H PRN  oxyCODONE, 15 mg, Oral, Q4H PRN      Discharge Plan: TBD - Anticipate discharge to home when antibiotics complete per ID    ('continue IV antibiotic treatment to finish 6 weeks total through 09/27/2020")    Network Utilization Review Department  [email or web address]  ATTENTION: Please call with any questions or concerns to [phone] and carefully listen to the prompts so that you are directed to the right person  All voicemails are confidential   Sudie Crigler all requests for admission clinical reviews, approved or denied determinations and any other requests to dedicated fax number below belonging to the campus where the patient is receiving treatment   List of dedicated fax numbers for the Facilities:  [address] DENIALS (Administrative/Medical Necessity) [phone]   [address] (Maternity/NICU/Pediatrics) [phone] [address] [facility] [phone]   Ariana Sheets [phone]   [facility] [phone]   Wili [facility]argot [phone]   [address] [phone]   [facility]  [phone]   [address] [facility]  [address] [facility] [place] [address] [facility] [phone]

## 2020-09-17 NOTE — PHYSICAL THERAPY NOTE
Physical Therapy treatment Note ([id number])       09/17/20 1021   PT Last Visit   PT Visit Date 09/17/20   Pain Assessment   Pain Assessment Tool 0-10   Pain Score 9   Pain Location/Orientation Location: Chest  (surgical)   Restrictions/Precautions   Weight Bearing Precautions Per Order No   Other Precautions Cardiac/sternal   General   Chart Reviewed Yes   Family/Caregiver Present No   Cognition   Overall Cognitive Status WFL   Arousal/Participation Cooperative   Attention Within functional limits   Orientation Level Oriented X4   Following Commands Follows all commands and directions without difficulty   Subjective   Subjective "I want to take a walk "   Bed Mobility   Rolling R 6  Modified independent   Rolling L 6  Modified independent   Supine to Sit 6  Modified independent   Sit to Supine 6  Modified independent   Transfers   Sit to Stand 7  Independent   Stand to Sit 7  Independent   Stand pivot 7  Independent   Ambulation/Elevation   Gait pattern Improper Weight shift; Antalgic   Gait Assistance 7  Independent   Assistive Device None   Distance 500 feet   Stair Management Assistance 6  Modified independent   Additional items Verbal cues   Stair Management Technique One rail R;Step to pattern; Alternating pattern   Number of Stairs 14  (7 +7)   Activity Tolerance   Activity Tolerance Patient tolerated treatment well   Assessment   Assessment Pt observed coming out of room  Agreeable to do therapy, wanting to walk  Mask on and no AD  Pt gait stable without assistive device  Decrease stance time on RLE with increased anterior weight shift in right stance  No LOB  No SOB, No c/o CP or lightheadedness  Pt refuses any standing rest breaks  Pt demonstrated ability to ascend/descend FF of steps with one rail   She was instructed on technique of stronger less painful leg ascending and more painful leg descending whoever patient had less pain when descending with the LLE first  She was able to reciprocal climb when ascending but did find it with less pain on step to technique with LLE leading  Pt BP post ambulation/elevations 103/54, HR 54  Pt has attained all goals established on POC and feels she is ready for discharge from PT services  She is encouraged to ambulate on floor as able  She is agreeable      Barriers to Discharge None   Goals   Patient Goals "to get home after I am all finished with my antibiotics to be with my 1 yr old son "   STG Expiration Date 09/25/20   PT Treatment Day 3   Plan   Progress Discontinue PT   Recommendation   PT Discharge Recommendation Return to previous environment with social support   PT - OK to Discharge Yes   Additional Comments   (when medically cleared)     Yuniel Mendez, PT

## 2020-09-17 NOTE — PROGRESS NOTES
Pt requesting for ativan, dilaudid, and oxycodone to be given within 30-45 minutes apart from each other  Spoke with MILLIE Hoyt with acute pain and medications are only to be given 1 hour apart from one another  Pt was re-educated on medication regimen  Will continue to educate

## 2020-09-17 NOTE — PLAN OF CARE
Problem: PHYSICAL THERAPY ADULT  Goal: Performs mobility at highest level of function for planned discharge setting  See evaluation for individualized goals  Description: Treatment/Interventions: Functional transfer training, LE strengthening/ROM, Elevations, Therapeutic exercise, Endurance training, Equipment eval/education, Bed mobility, Gait training, Spoke to nursing, Spoke to case management, Spoke to advanced practitioner, OT(will defer from (R) hip therex at this time)  Equipment Recommended: Walker(at this time; will cont to monitor progress)       See flowsheet documentation for full assessment, interventions and recommendations  9/17/2020 1232 by Esther Call, PT  Outcome: Completed  Note: Prognosis: Good  Problem List: Decreased endurance, Decreased strength, Decreased range of motion, Impaired balance, Decreased mobility, Impaired judgement, Decreased skin integrity, Pain  Assessment: Pt observed coming out of room  Agreeable to do therapy, wanting to walk  Mask on and no AD  Pt gait stable without assistive device  Decrease stance time on RLE with increased anterior weight shift in right stance  No LOB  No SOB, No c/o CP or lightheadedness  Pt refuses any standing rest breaks  Pt demonstrated ability to ascend/descend FF of steps with one rail  She was instructed on technique of stronger less painful leg ascending and more painful leg descending whoever patient had less pain when descending with the LLE first  She was able to reciprocal climb when ascending but did find it with less pain on step to technique with LLE leading  Pt BP post ambulation/elevations 103/54, HR 54  Pt has attained all goals established on POC and feels she is ready for discharge from PT services  She is encouraged to ambulate on floor as able  She is agreeable     Barriers to Discharge: None     PT Discharge Recommendation: Return to previous environment with social support     PT - OK to Discharge: Yes    See flowsheet documentation for full assessment  9/17/2020 1232 by Dianne Oneal PT  Outcome: Progressing  Note: Prognosis: Good  Problem List: Decreased endurance, Decreased strength, Decreased range of motion, Impaired balance, Decreased mobility, Impaired judgement, Decreased skin integrity, Pain  Assessment: Pt observed coming out of room  Agreeable to do therapy, wanting to walk  Mask on and no AD  Pt gait stable without assistive device  Decrease stance time on RLE with increased anterior weight shift in right stance  No LOB  No SOB, No c/o CP or lightheadedness  Pt refuses any standing rest breaks  Pt demonstrated ability to ascend/descend FF of steps with one rail  She was instructed on technique of stronger less painful leg ascending and more painful leg descending whoever patient had less pain when descending with the LLE first  She was able to reciprocal climb when ascending but did find it with less pain on step to technique with LLE leading  Pt BP post ambulation/elevations 103/54, HR 54  Pt has attained all goals established on POC and feels she is ready for discharge from PT services  She is encouraged to ambulate on floor as able  She is agreeable  Barriers to Discharge: None     PT Discharge Recommendation: Return to previous environment with social support     PT - OK to Discharge: Yes    See flowsheet documentation for full assessment

## 2020-09-18 LAB
GLUCOSE SERPL-MCNC: 93 MG/DL (ref 65–140)
INR PPP: 1.91 (ref 0.84–1.19)
PROTHROMBIN TIME: 21.8 SECONDS (ref 11.6–14.5)

## 2020-09-18 PROCEDURE — 82948 REAGENT STRIP/BLOOD GLUCOSE: CPT

## 2020-09-18 PROCEDURE — 99024 POSTOP FOLLOW-UP VISIT: CPT | Performed by: PHYSICIAN ASSISTANT

## 2020-09-18 PROCEDURE — 85610 PROTHROMBIN TIME: CPT | Performed by: PHYSICIAN ASSISTANT

## 2020-09-18 PROCEDURE — 99233 SBSQ HOSP IP/OBS HIGH 50: CPT | Performed by: INTERNAL MEDICINE

## 2020-09-18 RX ORDER — HYDROMORPHONE HCL/PF 1 MG/ML
1 SYRINGE (ML) INJECTION EVERY 4 HOURS PRN
Status: DISCONTINUED | OUTPATIENT
Start: 2020-09-18 | End: 2020-09-22

## 2020-09-18 RX ORDER — WARFARIN SODIUM 1 MG/1
3 TABLET ORAL
Status: COMPLETED | OUTPATIENT
Start: 2020-09-18 | End: 2020-09-18

## 2020-09-18 RX ORDER — MELATONIN
1000 DAILY
Status: DISCONTINUED | OUTPATIENT
Start: 2020-09-19 | End: 2020-09-28 | Stop reason: HOSPADM

## 2020-09-18 RX ADMIN — CALCIUM ACETATE 667 MG: 667 CAPSULE ORAL at 17:33

## 2020-09-18 RX ADMIN — OXYCODONE HYDROCHLORIDE 15 MG: 10 TABLET ORAL at 20:35

## 2020-09-18 RX ADMIN — DOCUSATE SODIUM 100 MG: 100 CAPSULE, LIQUID FILLED ORAL at 08:45

## 2020-09-18 RX ADMIN — ACETAMINOPHEN 975 MG: 325 TABLET, FILM COATED ORAL at 20:30

## 2020-09-18 RX ADMIN — DOCUSATE SODIUM 100 MG: 100 CAPSULE, LIQUID FILLED ORAL at 17:34

## 2020-09-18 RX ADMIN — POLYETHYLENE GLYCOL 3350 17 G: 17 POWDER, FOR SOLUTION ORAL at 08:45

## 2020-09-18 RX ADMIN — OXYCODONE HYDROCHLORIDE 15 MG: 10 TABLET ORAL at 16:33

## 2020-09-18 RX ADMIN — ACETAMINOPHEN 975 MG: 325 TABLET, FILM COATED ORAL at 10:39

## 2020-09-18 RX ADMIN — METHOCARBAMOL TABLETS 750 MG: 750 TABLET, COATED ORAL at 23:06

## 2020-09-18 RX ADMIN — CALCIUM ACETATE 667 MG: 667 CAPSULE ORAL at 11:28

## 2020-09-18 RX ADMIN — LORAZEPAM 1 MG: 2 INJECTION INTRAMUSCULAR; INTRAVENOUS at 05:15

## 2020-09-18 RX ADMIN — WARFARIN SODIUM 3 MG: 1 TABLET ORAL at 17:34

## 2020-09-18 RX ADMIN — CALCIUM ACETATE 667 MG: 667 CAPSULE ORAL at 08:45

## 2020-09-18 RX ADMIN — METHOCARBAMOL TABLETS 750 MG: 750 TABLET, COATED ORAL at 11:28

## 2020-09-18 RX ADMIN — Medication 250 MG: at 17:36

## 2020-09-18 RX ADMIN — OLANZAPINE 5 MG: 5 TABLET, FILM COATED ORAL at 20:32

## 2020-09-18 RX ADMIN — MIRTAZAPINE 30 MG: 30 TABLET, FILM COATED ORAL at 20:32

## 2020-09-18 RX ADMIN — METHOCARBAMOL TABLETS 750 MG: 750 TABLET, COATED ORAL at 17:33

## 2020-09-18 RX ADMIN — Medication 250 MG: at 08:45

## 2020-09-18 RX ADMIN — MELATONIN 6 MG: at 20:32

## 2020-09-18 RX ADMIN — CEFAZOLIN SODIUM 2000 MG: 2 SOLUTION INTRAVENOUS at 08:44

## 2020-09-18 RX ADMIN — AMIODARONE HYDROCHLORIDE 200 MG: 200 TABLET ORAL at 05:20

## 2020-09-18 RX ADMIN — HYDROMORPHONE HYDROCHLORIDE 2 MG: 1 INJECTION, SOLUTION INTRAMUSCULAR; INTRAVENOUS; SUBCUTANEOUS at 01:23

## 2020-09-18 RX ADMIN — DULOXETINE HYDROCHLORIDE 60 MG: 60 CAPSULE, DELAYED RELEASE ORAL at 08:45

## 2020-09-18 RX ADMIN — SENNOSIDES 8.6 MG: 8.6 TABLET ORAL at 08:46

## 2020-09-18 RX ADMIN — OXYCODONE HYDROCHLORIDE AND ACETAMINOPHEN 500 MG: 500 TABLET ORAL at 08:45

## 2020-09-18 RX ADMIN — OXYCODONE HYDROCHLORIDE 15 MG: 10 TABLET ORAL at 09:38

## 2020-09-18 RX ADMIN — FUROSEMIDE 20 MG: 10 INJECTION, SOLUTION INTRAMUSCULAR; INTRAVENOUS at 08:46

## 2020-09-18 RX ADMIN — MAGNESIUM OXIDE TAB 400 MG (241.3 MG ELEMENTAL MG) 400 MG: 400 (241.3 MG) TAB at 08:45

## 2020-09-18 RX ADMIN — POTASSIUM CHLORIDE 10 MEQ: 750 TABLET, EXTENDED RELEASE ORAL at 08:45

## 2020-09-18 RX ADMIN — PANTOPRAZOLE SODIUM 40 MG: 40 TABLET, DELAYED RELEASE ORAL at 08:45

## 2020-09-18 RX ADMIN — HYDROMORPHONE HYDROCHLORIDE 1 MG: 1 INJECTION, SOLUTION INTRAMUSCULAR; INTRAVENOUS; SUBCUTANEOUS at 23:03

## 2020-09-18 RX ADMIN — LORAZEPAM 1 MG: 2 INJECTION INTRAMUSCULAR; INTRAVENOUS at 02:29

## 2020-09-18 RX ADMIN — MICONAZOLE NITRATE: 2 CREAM TOPICAL at 08:46

## 2020-09-18 RX ADMIN — ASPIRIN 81 MG CHEWABLE TABLET 81 MG: 81 TABLET CHEWABLE at 08:45

## 2020-09-18 RX ADMIN — ACETAMINOPHEN 975 MG: 325 TABLET, FILM COATED ORAL at 02:31

## 2020-09-18 RX ADMIN — HYDROMORPHONE HYDROCHLORIDE 1 MG: 1 INJECTION, SOLUTION INTRAMUSCULAR; INTRAVENOUS; SUBCUTANEOUS at 17:33

## 2020-09-18 RX ADMIN — FONDAPARINUX SODIUM 2.5 MG: 2.5 INJECTION, SOLUTION SUBCUTANEOUS at 10:39

## 2020-09-18 RX ADMIN — MAGNESIUM OXIDE TAB 400 MG (241.3 MG ELEMENTAL MG) 400 MG: 400 (241.3 MG) TAB at 17:34

## 2020-09-18 RX ADMIN — HYDROMORPHONE HYDROCHLORIDE 1 MG: 1 INJECTION, SOLUTION INTRAMUSCULAR; INTRAVENOUS; SUBCUTANEOUS at 10:39

## 2020-09-18 RX ADMIN — FERROUS SULFATE TAB 325 MG (65 MG ELEMENTAL FE) 325 MG: 325 (65 FE) TAB at 08:45

## 2020-09-18 RX ADMIN — SENNOSIDES 8.6 MG: 8.6 TABLET ORAL at 17:36

## 2020-09-18 RX ADMIN — CEFAZOLIN SODIUM 2000 MG: 2 SOLUTION INTRAVENOUS at 16:08

## 2020-09-18 NOTE — SOCIAL WORK
Pt's LOS is 37 days  Pt remains hospitalized for continued infusion of IV ABX through 9/27/20  Pt must remain hospitalized infusion of IV ABX because pt is unable to be safely d/c'd home w/ a [address] [place] [address] to provide pt w/ home care and home infusion of her IV ABX  This is due to pt's hx of IV Heroin abuse and the high liability she could use the port site for her IV ABX instead for IV street drugs  Pt is not a candidate for the [facility] due to her lack of desire to seek help to stop using IV Heroin  CM to follow

## 2020-09-18 NOTE — PROGRESS NOTES
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:  Prolonged course of IV antibiotic was previously recommended, but patient has left AMA on 2 prior occasions   She remains hemodynamically stable   Repeat blood culture negative on 08/15/2020; operative culture also negative  -continue cefazolin at least through 9/27/2020 to complete 6 weeks from the 1st negative blood culture  -patient is postop day 8 for tricuspid valve surgery; operative culture negative  Would continue IV antibiotic treatment to finish 6 weeks total through 09/27/2020  -check CBC with diff and CMP weekly while on the IV antibiotics     2  Tricuspid valve endocarditis, 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:  MELISSA done 07/21/2020 showed large vegetation on TV with severe regurgitation  7/15 CT abdomen/pelvis also showed bilateral renal parenchymal abnormalities concerning for septic emboli, patient also had right-sided loculated pleural effusion   No intra-abdominal abscess   Repeat CT chest 09/04/2020 shows resolution of right-sided pleural effusion  Patient is status post repair of tricuspid valve with annuloplasty on 09/10/2020; sternal wound shows no dehiscence, no drainage or erythema   -antibiotic plan as above  -close CT surgery follow-up     3   Right iliacus muscle abscesses/sacroiliitis:  CT scan of right lower extremity on 08/21/2020 showed 9 mm iliacus muscle collection, decreasing in size   Follow-up imaging with MRI done 08/31/2020 with evidence of sacroiliitis that is probably septic   There are no destructive changes or fluid collection   Suspect this is the cause of the patient's ongoing pain   Inflammatory markers decreased to  near normal levels, though increased again when checked immediately after  her recent sternotomy and valvular surgery  -antibiotics as above  -will repeat ESR and CRP 2020;  if inflammatory markers have not returned to normal before end of IV antibiotic treatment, would consider additional course of p o  Antibiotics  Also if inflammatory markers remain elevated, and right buttock pain persist, will consider repeating MRI of right sacroiliac joint also including iliacus muscle to rule out persistent collection  -pain management        4  Active IV heroin abuse:   This is the causative factor for development of bacteremia and infective endocarditis   Patient has left AMA on prior occasions   HIV screen negative  -patient is not a candidate for at home PICC line/IV antibiotics  -acute pain service follow-up     5  Chronic HCV infection:   Recent HIV was negative   The LFTs are waxing waning  Review of Care everywhere shows high hepatitis C viral load 2020   -monitor LFTs   - outpatient follow-up with GI     6  Left upper extremity DVT:  in the setting of PICC line use   Patient now on anticoagulation  -anticoagulation as per primary and vascular team        Discussed the above management plan with the primary service  Plan mentioned above discussed with the patient     Antibiotics:  Cefazolin restart 39  Negative blood cultures 31  Postop day 8       Subjective:  Patient has no fever, chills, sweats; no nausea, vomiting, diarrhea; no cough, shortness of breath; Patient complaining of sternal wound itching  Patient complaining of right buttock pain    Objective:  Vitals:  Temp:  [98 1 °F (36 7 °C)-98 5 °F (36 9 °C)] 98 1 °F (36 7 °C)  HR:  [60-85] 71  Resp:  [18] 18  BP: ()/(46-59) 108/51  SpO2:  [96 %-98 %] 98 %  Temp (24hrs), Av 2 °F (36 8 °C), Min:98 1 °F (36 7 °C), Max:98 5 °F (36 9 °C)  Current: Temperature: 98 1 °F (36 7 °C)    Physical Exam:   General Appearance:  Alert, interactive, nontoxic, no acute distress  Throat: Oropharynx moist without lesions      Lungs:   Clear to auscultation bilaterally; no wheezes, rhonchi or rales; respirations unlabored   Heart:  RRR; no murmur, rub or gallop   Abdomen:   Soft, non-tender, non-distended, positive bowel sounds  Extremities: No clubbing, cyanosis or edema  Tenderness over right buttock, but no limitation range of motion of the hip   Skin: No new rashes or lesions  Sternal wound shows no dehiscence, no erythema, no drainage         Labs, Imaging, & Other studies:   All pertinent labs and imaging studies were personally reviewed  Results from last 7 days   Lab Units 09/17/20  0520 09/16/20  1509 09/16/20  0432 09/14/20  0853 09/13/20  0417   WBC Thousand/uL  --   --  5 92 5 42 6 10   HEMOGLOBIN g/dL 7 8* 8 1* 6 9* 7 0* 7 4*   PLATELETS Thousands/uL  --   --  253 181 170     Results from last 7 days   Lab Units 09/17/20  0520 09/14/20  0438 09/14/20  0437 09/13/20 0417   SODIUM mmol/L 141 141  --  140   POTASSIUM mmol/L 4 2 3 5  --  3 5   CHLORIDE mmol/L 106 106  --  105   CO2 mmol/L 28 29  --  28   BUN mg/dL 9 8  --  7   CREATININE mg/dL 0 43* 0 32*  --  0 33*   EGFR ml/min/1 73sq m 140 154  --  153   CALCIUM mg/dL 8 6 8 0*  --  8 2*   AST U/L  --   --  19  --    ALT U/L  --   --  14  --    ALK PHOS U/L  --   --  128*  --              Results from last 7 days   Lab Units 09/14/20  0437   CRP mg/L 113 0*

## 2020-09-18 NOTE — QUICK NOTE
34y o  year old female with PMH of hepatitis C, long standing opiate abuse followed by IV drug abuse with heroin who is admitted for MSSA tricuspid endocarditis and severe tricuspid regurgitation s/p Complex tricuspid valve repair with leaflet resection and repair endocrinology was consulted for hyperphosphatemia  Please check phosphorus from peripheral and PICC line at the same time    Patient has VIT-D deficiency, with low 25 OH and 1,25 OH vitamin D  Will start VIT-D supplement

## 2020-09-18 NOTE — PROGRESS NOTES
Progress Note - Acute Pain Service    Alessia Elena 34 y o  female MRN: [id number]  Unit/Bed#: [facility] 421-01 Encounter: [id number]      Assessment:   Principal Problem:    Bacteremia due to Staphylococcus aureus  Active Problems:    Acute bacterial endocarditis    Septic embolism (HCC)    Bipolar 1 disorder (HCC)    Right hip pain    Intravenous drug abuse ([address] )    DVT of axillary vein, acute left (HCC)    Transaminitis    Rash    Anemia    Hyperphosphatemia    S/P TVR (tricuspid valve repair)    Alessia Elena is a 34 y o  female  with endocarditis status post MVR, septic emboli, right sacroiliitis  Plan:   · Continue acetaminophen 975 mg p o  q 8 hours scheduled  Continue oxycodone 15 mg p o  q 4 hours p r n  severe pain  · Decrease hydromorphone to 1 mg IV q 4 hours p r n  breakthrough pain  · Continue duloxetine 60 mg p o  daily  · Continue lorazepam 1 mg IV q 2 hours p r n  anxiety  · Continue Zyprexa 5 mg p o  hs scheduled  · Continue methocarbamol 750 mg p o  q 6 hours scheduled  · Continue lidocaine 5% patch, 2 patches for 12 hours daily  ·   · No plans to wean medications over the weekend, plan to decrease oxycodone on 9/21/20    - Docusate (Colace) 100 mg PO twice daily  - Senna 1 tablet PO qhs  - Polyethylene glycol (Miralax) 17g PO once daily PRN    APS will continue to follow  Please call  / [phone] or [employer] [facility] - [facility] (/ between [phone] and on weekends) with questions or concerns    Pain History  Current pain location(s):  Right hip, anterior chest wall  Pain Scale:   7/10  Quality:  Ache  24 hour history:  Patient doing well with decrease in benzodiazepine from yesterday  Plan decrease of Dilaudid IV today  No changes plan for the weekend  Pain improving and patient more ambulatory      Opioid requirement previous 24 hours:  Hydromorphone 7 mg IV, oxycodone 60 mg p o     Meds/Allergies   all current active meds have been reviewed, current meds:   Current Facility-Administered Medications   Medication Dose Route Frequency    acetaminophen (TYLENOL) tablet 975 mg  975 mg Oral Q8H    ascorbic acid (VITAMIN C) tablet 500 mg  500 mg Oral Daily    aspirin chewable tablet 81 mg  81 mg Oral Daily    bisacodyl (DULCOLAX) rectal suppository 10 mg  10 mg Rectal Daily PRN    calcium acetate (PHOSLO) capsule 667 mg  667 mg Oral TID With Meals    calcium carbonate (TUMS) chewable tablet 1,000 mg  1,000 mg Oral Daily PRN    ceFAZolin (ANCEF) IVPB (premix) 2,000 mg 50 mL  2,000 mg Intravenous Q8H    dicyclomine (BENTYL) capsule 10 mg  10 mg Oral TID PRN    docusate sodium (COLACE) capsule 100 mg  100 mg Oral BID    DULoxetine (CYMBALTA) delayed release capsule 60 mg  60 mg Oral Daily    ergocalciferol (VITAMIN D2) capsule 50,000 Units  50,000 Units Oral Once per day on Mon Wed Fri    ferrous sulfate tablet 325 mg  325 mg Oral Daily With Breakfast    fondaparinux (ARIXTRA) subcutaneous injection 2 5 mg  2 5 mg Subcutaneous Q24H    glycopyrrolate (ROBINUL) injection 0 2 mg  0 2 mg Intravenous Q4H PRN    HYDROmorphone (DILAUDID) injection 1 mg  1 mg Intravenous Q4H PRN    iohexol (OMNIPAQUE) 240 MG/ML solution 50 mL  50 mL Oral 90 min pre-procedure    lidocaine (LIDODERM) 5 % patch 2 patch  2 patch Topical Daily    LORazepam (ATIVAN) injection 1 mg  1 mg Intravenous Q2H PRN    magnesium oxide (MAG-OX) tablet 400 mg  400 mg Oral BID    melatonin tablet 6 mg  6 mg Oral HS    methocarbamol (ROBAXIN) tablet 750 mg  750 mg Oral Q6H [address]    miconazole 2 % cream   Topical BID    mirtazapine (REMERON) tablet 30 mg  30 mg Oral HS    OLANZapine (ZyPREXA) tablet 5 mg  5 mg Oral HS    ondansetron (ZOFRAN) injection 4 mg  4 mg Intravenous Q6H PRN    oxyCODONE (ROXICODONE) IR tablet 15 mg  15 mg Oral Q4H PRN    pantoprazole (PROTONIX) EC tablet 40 mg  40 mg Oral Daily    polyethylene glycol (MIRALAX) packet 17 g  17 g Oral Daily    saccharomyces boulardii (FLORASTOR) capsule 250 mg  250 mg Oral BID    senna (SENOKOT) tablet 8 6 mg  1 tablet Oral BID    warfarin (COUMADIN) tablet 3 mg  3 mg Oral Once (warfarin)    and PTA meds:   None       Allergies   Allergen Reactions    Cat Hair Extract     Dog Epithelium     Latex     Pollen Extract        Objective     Temp:  [98 1 °F (36 7 °C)-98 5 °F (36 9 °C)] 98 2 °F (36 8 °C)  HR:  [60-85] 60  Resp:  [18] 18  BP: ()/(46-59) 82/46    Physical Exam  Vitals signs and nursing note reviewed  Constitutional:       General: She is awake  She is not in acute distress  Eyes:      Pupils: Pupils are equal, round, and reactive to light  Skin:     General: Skin is warm and dry  Neurological:      General: No focal deficit present  Mental Status: She is alert and oriented to person, place, and time  GCS: GCS eye subscore is 4  GCS verbal subscore is 5  GCS motor subscore is 6  Psychiatric:         Behavior: Behavior is cooperative  Lab Results:   Results from last 7 days   Lab Units 09/17/20  0520  09/16/20  0432   WBC Thousand/uL  --   --  5 92   HEMOGLOBIN g/dL 7 8*   < > 6 9*   HEMATOCRIT % 24 8*   < > 23 0*   PLATELETS Thousands/uL  --   --  253    < > = values in this interval not displayed  Results from last 7 days   Lab Units 09/17/20  0520  09/14/20  0437   POTASSIUM mmol/L 4 2   < >  --    CHLORIDE mmol/L 106   < >  --    CO2 mmol/L 28   < >  --    BUN mg/dL 9   < >  --    CREATININE mg/dL 0 43*   < >  --    CALCIUM mg/dL 8 6   < >  --    ALK PHOS U/L  --   --  128*   ALT U/L  --   --  14   AST U/L  --   --  19    < > = values in this interval not displayed  Imaging Studies: I have personally reviewed pertinent reports  EKG, Pathology, and Other Studies: I have personally reviewed pertinent reports  Counseling / Coordination of Care  Total floor / unit time spent today 20 minutes   Greater than 50% of total time was spent with the patient and / or family counseling and / or coordination of care  A description of the counseling / coordination of care: Or patient interview, physical examination, review of medical record, review of imaging and laboratory data, development of pain management plan, discussion of pain management plan with patient and primary service  Please note that the APS provides consultative services regarding pain management only  With the exception of ketamine and epidural infusions and except when indicated, final decisions regarding starting or changing doses of analgesic medications are at the discretion of the consulting service  Off hours consultation and/or medication management is generally not available      Lyric Lemus PA-C  Acute Pain Service

## 2020-09-18 NOTE — PROGRESS NOTES
Progress Note - Cardiothoracic Surgery   Sajan Love 34 y o  female MRN: [id number]  Unit/Bed#: [facility] 421-01 Encounter: [id number]    Tricuspid Valve Endocarditis  S/P tricuspid valve replacement; POD # 8    24 Hour Events: Hypotensive overnight  Asymptomatic  Complaining of left wrist pain at site of prior oleg      Medications:   Scheduled Meds:  Current Facility-Administered Medications   Medication Dose Route Frequency Provider Last Rate    acetaminophen  975 mg Oral Q8H Juana Paula PA-C      amiodarone  200 mg Oral Q8H [facility] Juana Paula PA-C      ascorbic acid  500 mg Oral Daily Sindhu Max PA-C      aspirin  81 mg Oral Daily Juana Paula PA-C      bisacodyl  10 mg Rectal Daily PRN Juana Paula PA-C      calcium acetate  667 mg Oral TID With Meals Juana Paula PA-C      calcium carbonate  1,000 mg Oral Daily PRN Juana Paula PA-C      cefazolin  2,000 mg Intravenous Q8H Wiliam Álvarez MD 2,000 mg (09/18/20 0844)    dicyclomine  10 mg Oral TID PRN Juana Paula PA-C      docusate sodium  100 mg Oral BID Juana Paula PA-C      DULoxetine  60 mg Oral Daily Juana Paula PA-C      ergocalciferol  50,000 Units Oral Once per day on Mon Wed Fri Lindsay Marcano PA-C      ferrous sulfate  325 mg Oral Daily With Breakfast Amara Browning PA-C      fondaparinux  2 5 mg Subcutaneous Q24H Amara Browning PA-C      furosemide  20 mg Intravenous Daily Sindhu Max PA-C      glycopyrrolate  0 2 mg Intravenous Q4H PRN Juana Paula PA-C      haloperidol lactate  2 mg Intramuscular Q30 Min PRN Juana Paula PA-C      HYDROmorphone  2 mg Intravenous Q4H PRN Jose D See PA-C      insulin lispro  1-5 Units Subcutaneous TID ADDIE Judd PA-C      iohexol  50 mL Oral 90 min pre-procedure Juana Paula PA-C      lidocaine  2 patch Topical Daily Juana Paula PA-C      LORazepam  1 mg Intravenous Q2H PRN Jose D Blunt MILLIE See      magnesium oxide  400 mg Oral BID Demetria Ribera PA-C      melatonin  6 mg Oral HS Demetria Ribera PA-C      methocarbamol  750 mg Oral Q6H [address] Amara Browning PA-C      metoprolol tartrate  12 5 mg Oral Q12H [address] [place]      miconazole   Topical BID Vasile Armijo PA-C      mirtazapine  30 mg Oral HS Amara Browning PA-C      OLANZapine  5 mg Oral HS Amara Browning PA-C      ondansetron  4 mg Intravenous Q6H PRN Vasile Armijo PA-C      oxyCODONE  15 mg Oral Q4H PRN Vasile Armijo PA-C      pantoprazole  40 mg Oral Daily Vasile Armijo PA-C      polyethylene glycol  17 g Oral Daily Vasile Armijo PA-C      potassium chloride  10 mEq Oral Daily Amara Browning PA-C      saccharomyces boulardii  250 mg Oral BID Vasile Armijo PA-C      senna  1 tablet Oral BID Vasile Armijo PA-C       Continuous Infusions:   PRN Meds: bisacodyl    calcium carbonate    dicyclomine    glycopyrrolate    haloperidol lactate    HYDROmorphone    LORazepam    ondansetron    oxyCODONE    Vitals:   Vitals:    09/17/20 2136 09/18/20 0600 09/18/20 0659 09/18/20 0850   BP: 91/55  (!) 84/46 (!) 82/46   BP Location: Right arm  Right arm    Pulse: 69  60    Resp: 18  18    Temp: 98 5 °F (36 9 °C)  98 2 °F (36 8 °C)    TempSrc: Oral  Oral    SpO2: 96%  96%    Weight:  69 5 kg (153 lb 3 2 oz)     Height:         Bp x24hrs: /50-60    Telemetry: NSR; Heart Rate: 82    Respiratory:    SpO2: 96 %, O2 Device: None (Room air); Intake/Output:   I/O       09/16 0701 - 09/17 0700 09/17 0701 - 09/18 0700 09/18 0701 - 09/19 0700    P  O  525 780     I V  (mL/kg) 118 (1 7)      IV Piggyback 50 100 50    Total Intake(mL/kg) 693 (9 8) 880 (12 7) 50 (0 7)    Urine (mL/kg/hr) 1550 (0 9) 800 (0 5)     Total Output 1550 800     Net -857 +80 +50           Unmeasured Urine Occurrence  5 x         UOP - 2 unmeasured occurances/8hrs; 800cc w/ 5 unmeasured occurances/24hrs    Chest tube Output:  CTs & EPWs have been discontinued    Weights:   Weight (last 2 days)     Date/Time   Weight    09/18/20 0600   69 5 (153 2)    09/17/20 0600   70 9 (156 31)    09/16/20 0600   72 8 (160 5)    09/16/20 0421   72 8 (160 5)            Admit weight: 67 9kg - up 1 5kg from admission weight    Results:   NO NEW CBC OR BMP TODAY  Results from last 7 days   Lab Units 09/17/20  0520 09/16/20  1509 09/16/20  0432 09/14/20  0853 09/13/20  0417   WBC Thousand/uL  --   --  5 92 5 42 6 10   HEMOGLOBIN g/dL 7 8* 8 1* 6 9* 7 0* 7 4*   HEMATOCRIT % 24 8* 26 0* 23 0* 22 4* 24 7*   PLATELETS Thousands/uL  --   --  253 181 170     Results from last 7 days   Lab Units 09/17/20  0520 09/14/20  0438 09/13/20  0417   SODIUM mmol/L 141 141 140   POTASSIUM mmol/L 4 2 3 5 3 5   CHLORIDE mmol/L 106 106 105   CO2 mmol/L 28 29 28   BUN mg/dL 9 8 7   CREATININE mg/dL 0 43* 0 32* 0 33*   CALCIUM mg/dL 8 6 8 0* 8 2*     Results from last 7 days   Lab Units 09/18/20  0517 09/17/20  0520 09/16/20  0432   INR  1 91* 1 91* 2 34*     Coumadin mg 9/17 - 2  9/16 - 3  9/15 - 1  9/14 - 5  9/13 - 5  9/12 - 5          Point of care glucose: 93 - 106 - 105    Studies:  No new studies    I have personally reviewed pertinent reports  and I have personally reviewed pertinent films in PACS    Invasive Lines/Tubes:  Invasive Devices     Peripherally Inserted Central Catheter Line            PICC Line 08/26/20 22 days                Physical Exam:    HEENT/NECK:  PERRLA  No jugular venous distention  Cardiac: Regular rate and rhythm and No murmurs/rubs/gallops  Pulmonary:  Breath sounds clear bilaterally and No rales/rhonchi/wheezes  Abdomen:  Non-tender, Non-distended and Normal bowel sounds  Incisions: Sternum is stable  Incision is clean, dry, and intact     Extremities: Extremities warm/dry and No edema B/L  Neuro: Alert and oriented X 3, Sensation is grossly intact and No focal deficits  Skin: Warm/Dry, without rashes or lesions  Assessment:  Principal Problem:    Bacteremia due to Staphylococcus aureus  Active Problems:    Acute bacterial endocarditis    Septic embolism (HCC)    Bipolar 1 disorder (HCC)    Right hip pain    Intravenous drug abuse ([address] )    DVT of axillary vein, acute left (HCC)    Transaminitis    Rash    Anemia    Hyperphosphatemia    S/P TVR (tricuspid valve repair)       Tricuapid Valve Endocarditis  S/P tricuspid valve replacement; POD # 8    Plan:    1  Cardiac:   NSR; Hypotensive  Hold beta blocker for hypotension  Discontinue amiodarone  Continue ASA and coumadin therapy  INR 1 91, dose Coumadin 3 mg tonight  Epicardial pacing wires out  PICC for abx  Continue DVT prophylaxis    2  Pulmonary:   Good Room air oxygen saturation; Continue incentive spirometry/Coughing/Deep breathing exercises  Chest tubes have been discontinued    3  Renal:   Intake/Output net: (-)800 mL/24 hours -- likely more (-)  Discontinue diuretic  Post op Creatinine stable; Follow up labs prn    4  Neuro:  Neurologically intact; No active issues  Pain regimen per APS  Continue Tylenol, 975 mg PO q 8, standing dose  Continue Oxycodone, 2 5 to 5 mg PO q 4 hours prn pain    Continue Dilaudid    Continue Robaxin    Continue Lidocaine patches    Continue Ativan   Continue Cymbalta   Continue Zyprexa   Continue Melatonin   Continue Remeron    5  GI:  Tolerating TLC 2 3 gm sodium diet and low phos diet  Maintain 2000 mL daily fluid restriction   May consider switch to regular house diet on Monday  Continue stool softeners and prn suppository and Senna  Continue GI prophylaxis    6  Endo:   No history of diabetes; Glucose well-controlled  Discontinue SSIC   Continue Vitamin D supplementation    7    Hematology:    Endocarditis    Afebrile    WBC WNL    Last BC (-)    Ancef through 9/27    ID following   Continue iron/vitamin C supplementation    8     Disposition:      Ambulating independently, Anticipate discharge to home once abx complete 9/27     VTE Pharmacologic Prophylaxis: Fondaparinux (Arixtra)  VTE Mechanical Prophylaxis: sequential compression device    Collaborative rounds completed with SARAH Cooper    Plan of care discussed with bedside nurse    SIGNATURE: Hussein Hinton PA-C  DATE: September 18, 2020  TIME: 10:06 AM

## 2020-09-19 LAB
ANION GAP SERPL CALCULATED.3IONS-SCNC: 7 MMOL/L (ref 4–13)
BUN SERPL-MCNC: 11 MG/DL (ref 5–25)
CALCIUM SERPL-MCNC: 8.8 MG/DL (ref 8.3–10.1)
CHLORIDE SERPL-SCNC: 107 MMOL/L (ref 100–108)
CO2 SERPL-SCNC: 28 MMOL/L (ref 21–32)
CREAT SERPL-MCNC: 0.49 MG/DL (ref 0.6–1.3)
GFR SERPL CREATININE-BSD FRML MDRD: 134 ML/MIN/1.73SQ M
GLUCOSE SERPL-MCNC: 88 MG/DL (ref 65–140)
HCT VFR BLD AUTO: 25.1 % (ref 34.8–46.1)
HGB BLD-MCNC: 8 G/DL (ref 11.5–15.4)
INR PPP: 1.75 (ref 0.84–1.19)
PHOSPHATE SERPL-MCNC: 5.3 MG/DL (ref 2.7–4.5)
POTASSIUM SERPL-SCNC: 4 MMOL/L (ref 3.5–5.3)
PROTHROMBIN TIME: 20.4 SECONDS (ref 11.6–14.5)
SODIUM SERPL-SCNC: 142 MMOL/L (ref 136–145)

## 2020-09-19 PROCEDURE — 84100 ASSAY OF PHOSPHORUS: CPT | Performed by: STUDENT IN AN ORGANIZED HEALTH CARE EDUCATION/TRAINING PROGRAM

## 2020-09-19 PROCEDURE — 85018 HEMOGLOBIN: CPT | Performed by: PHYSICIAN ASSISTANT

## 2020-09-19 PROCEDURE — 80048 BASIC METABOLIC PNL TOTAL CA: CPT | Performed by: PHYSICIAN ASSISTANT

## 2020-09-19 PROCEDURE — 99024 POSTOP FOLLOW-UP VISIT: CPT | Performed by: PHYSICIAN ASSISTANT

## 2020-09-19 PROCEDURE — 85014 HEMATOCRIT: CPT | Performed by: PHYSICIAN ASSISTANT

## 2020-09-19 PROCEDURE — 85610 PROTHROMBIN TIME: CPT | Performed by: PHYSICIAN ASSISTANT

## 2020-09-19 RX ORDER — METHOCARBAMOL 750 MG/1
750 TABLET, FILM COATED ORAL EVERY 8 HOURS SCHEDULED
Status: DISCONTINUED | OUTPATIENT
Start: 2020-09-19 | End: 2020-09-28 | Stop reason: HOSPADM

## 2020-09-19 RX ORDER — WARFARIN SODIUM 1 MG/1
3 TABLET ORAL
Status: COMPLETED | OUTPATIENT
Start: 2020-09-19 | End: 2020-09-19

## 2020-09-19 RX ADMIN — ACETAMINOPHEN 975 MG: 325 TABLET, FILM COATED ORAL at 11:44

## 2020-09-19 RX ADMIN — OXYCODONE HYDROCHLORIDE 15 MG: 10 TABLET ORAL at 19:24

## 2020-09-19 RX ADMIN — MAGNESIUM OXIDE TAB 400 MG (241.3 MG ELEMENTAL MG) 400 MG: 400 (241.3 MG) TAB at 17:20

## 2020-09-19 RX ADMIN — METHOCARBAMOL TABLETS 750 MG: 750 TABLET, COATED ORAL at 05:05

## 2020-09-19 RX ADMIN — CEFAZOLIN SODIUM 2000 MG: 2 SOLUTION INTRAVENOUS at 00:30

## 2020-09-19 RX ADMIN — OXYCODONE HYDROCHLORIDE 15 MG: 10 TABLET ORAL at 23:42

## 2020-09-19 RX ADMIN — OXYCODONE HYDROCHLORIDE AND ACETAMINOPHEN 500 MG: 500 TABLET ORAL at 08:54

## 2020-09-19 RX ADMIN — CEFAZOLIN SODIUM 2000 MG: 2 SOLUTION INTRAVENOUS at 08:53

## 2020-09-19 RX ADMIN — ASPIRIN 81 MG CHEWABLE TABLET 81 MG: 81 TABLET CHEWABLE at 08:53

## 2020-09-19 RX ADMIN — SENNOSIDES 8.6 MG: 8.6 TABLET ORAL at 17:21

## 2020-09-19 RX ADMIN — DOCUSATE SODIUM 100 MG: 100 CAPSULE, LIQUID FILLED ORAL at 08:53

## 2020-09-19 RX ADMIN — WARFARIN SODIUM 3 MG: 1 TABLET ORAL at 17:21

## 2020-09-19 RX ADMIN — METHOCARBAMOL TABLETS 750 MG: 750 TABLET, COATED ORAL at 15:48

## 2020-09-19 RX ADMIN — HYDROMORPHONE HYDROCHLORIDE 1 MG: 1 INJECTION, SOLUTION INTRAMUSCULAR; INTRAVENOUS; SUBCUTANEOUS at 10:03

## 2020-09-19 RX ADMIN — PANTOPRAZOLE SODIUM 40 MG: 40 TABLET, DELAYED RELEASE ORAL at 08:54

## 2020-09-19 RX ADMIN — METHOCARBAMOL TABLETS 750 MG: 750 TABLET, COATED ORAL at 23:42

## 2020-09-19 RX ADMIN — CEFAZOLIN SODIUM 2000 MG: 2 SOLUTION INTRAVENOUS at 23:42

## 2020-09-19 RX ADMIN — LORAZEPAM 1 MG: 2 INJECTION INTRAMUSCULAR; INTRAVENOUS at 05:04

## 2020-09-19 RX ADMIN — OXYCODONE HYDROCHLORIDE 15 MG: 10 TABLET ORAL at 08:53

## 2020-09-19 RX ADMIN — MIRTAZAPINE 30 MG: 30 TABLET, FILM COATED ORAL at 19:24

## 2020-09-19 RX ADMIN — CALCIUM ACETATE 667 MG: 667 CAPSULE ORAL at 16:30

## 2020-09-19 RX ADMIN — CEFAZOLIN SODIUM 2000 MG: 2 SOLUTION INTRAVENOUS at 16:30

## 2020-09-19 RX ADMIN — CALCIUM ACETATE 667 MG: 667 CAPSULE ORAL at 11:44

## 2020-09-19 RX ADMIN — CALCIUM ACETATE 667 MG: 667 CAPSULE ORAL at 08:53

## 2020-09-19 RX ADMIN — Medication 250 MG: at 17:20

## 2020-09-19 RX ADMIN — Medication 1000 UNITS: at 08:54

## 2020-09-19 RX ADMIN — FERROUS SULFATE TAB 325 MG (65 MG ELEMENTAL FE) 325 MG: 325 (65 FE) TAB at 08:54

## 2020-09-19 RX ADMIN — OXYCODONE HYDROCHLORIDE 15 MG: 10 TABLET ORAL at 14:35

## 2020-09-19 RX ADMIN — MELATONIN 6 MG: at 19:23

## 2020-09-19 RX ADMIN — SENNOSIDES 8.6 MG: 8.6 TABLET ORAL at 08:53

## 2020-09-19 RX ADMIN — HYDROMORPHONE HYDROCHLORIDE 1 MG: 1 INJECTION, SOLUTION INTRAMUSCULAR; INTRAVENOUS; SUBCUTANEOUS at 20:27

## 2020-09-19 RX ADMIN — MAGNESIUM OXIDE TAB 400 MG (241.3 MG ELEMENTAL MG) 400 MG: 400 (241.3 MG) TAB at 08:54

## 2020-09-19 RX ADMIN — ACETAMINOPHEN 975 MG: 325 TABLET, FILM COATED ORAL at 19:23

## 2020-09-19 RX ADMIN — HYDROMORPHONE HYDROCHLORIDE 1 MG: 1 INJECTION, SOLUTION INTRAMUSCULAR; INTRAVENOUS; SUBCUTANEOUS at 15:48

## 2020-09-19 RX ADMIN — Medication 250 MG: at 08:53

## 2020-09-19 RX ADMIN — FONDAPARINUX SODIUM 2.5 MG: 2.5 INJECTION, SOLUTION SUBCUTANEOUS at 11:45

## 2020-09-19 RX ADMIN — DULOXETINE HYDROCHLORIDE 60 MG: 60 CAPSULE, DELAYED RELEASE ORAL at 08:53

## 2020-09-19 RX ADMIN — LORAZEPAM 1 MG: 2 INJECTION INTRAMUSCULAR; INTRAVENOUS at 18:01

## 2020-09-19 RX ADMIN — OLANZAPINE 5 MG: 5 TABLET, FILM COATED ORAL at 19:25

## 2020-09-19 RX ADMIN — ACETAMINOPHEN 975 MG: 325 TABLET, FILM COATED ORAL at 04:30

## 2020-09-19 NOTE — PROGRESS NOTES
Progress Note - Cardiothoracic Surgery   Rosario Lopez 34 y o  female MRN: [id number]  Unit/Bed#: [facility] 421-01 Encounter: [id number]    Tricuspid Valve Endocarditis  S/P tricuspid valve replacement; POD # 9    24 Hour Events: Hypotensive overnight  Asymptomatic  Complaining of left wrist pain at site of prior oleg      Medications:   Scheduled Meds:  Current Facility-Administered Medications   Medication Dose Route Frequency Provider Last Rate    acetaminophen  975 mg Oral Q8H Deisy Judd PA-C      ascorbic acid  500 mg Oral Daily Gerardo InoMILLIE zaidi      aspirin  81 mg Oral Daily Cherl Radha, MILLIE      bisacodyl  10 mg Rectal Daily PRN Cherl Radha, MILLIE      calcium acetate  667 mg Oral TID With Meals Cherl Radha, MILLIE      calcium carbonate  1,000 mg Oral Daily PRN Cherl Radha, MILLIE      cefazolin  2,000 mg Intravenous Q8H Wiliam Álvarez MD 2,000 mg (09/19/20 0853)    cholecalciferol  1,000 Units Oral Daily Harvey Mane MD      dicyclomine  10 mg Oral TID PRN Cherl Radha, MILLIE      docusate sodium  100 mg Oral BID Cherl RadhaMILLIE      DULoxetine  60 mg Oral Daily Cherl Radha, MILLIE      ergocalciferol  50,000 Units Oral Once per day on Mon Wed Fri Lindsay Marcano PA-C      ferrous sulfate  325 mg Oral Daily With Breakfast Amara Browning PA-C      fondaparinux  2 5 mg Subcutaneous Q24H Amara Browning PA-C      glycopyrrolate  0 2 mg Intravenous Q4H PRN Cherl RadhaMILLIE      HYDROmorphone  1 mg Intravenous Q4H PRN Magdalen MILLIE Florentino      iohexol  50 mL Oral 90 min pre-procedure Cherl RadhaMILLIE sims      lidocaine  2 patch Topical Daily Emelinal RadhaMILLIE sims      LORazepam  1 mg Intravenous Q2H PRN Oksana Arnav See PA-C      magnesium oxide  400 mg Oral BID Gerardo MILLIE [place]      melatonin  6 mg Oral HS Amara Browning PA-C      methocarbamol  750 mg Oral Q6H [address] Amara Browning PA-C      miconazole   Topical BID Pat Severino PA-C      mirtazapine  30 mg Oral HS Amara Browning PA-C      OLANZapine  5 mg Oral HS Amara Browning PA-C      ondansetron  4 mg Intravenous Q6H PRN Pat Severino PA-C      oxyCODONE  15 mg Oral Q4H PRN Pat Severino PA-C      pantoprazole  40 mg Oral Daily Pat Severino PA-C      polyethylene glycol  17 g Oral Daily Pat Severino PA-C      saccharomyces boulardii  250 mg Oral BID Pat Severino PA-C      senna  1 tablet Oral BID Pat Severion PA-C       Continuous Infusions:   PRN Meds: bisacodyl    calcium carbonate    dicyclomine    glycopyrrolate    HYDROmorphone    LORazepam    ondansetron    oxyCODONE    Vitals:   Vitals:    09/18/20 2023 09/18/20 2307 09/19/20 0513 09/19/20 0839   BP:  (!) 94/42 110/57 146/69   BP Location:  Left arm Left arm Left arm   Pulse:  70 75 82   Resp:  18 18 18   Temp:  98 4 °F (36 9 °C) 98 2 °F (36 8 °C) 98 5 °F (36 9 °C)   TempSrc:  Oral Oral Oral   SpO2: 97% 98% 98% 99%   Weight:    68 5 kg (151 lb 1 6 oz)   Height:         Bp x24hrs: /50-60    Telemetry: NSR; Heart Rate: 82    Respiratory:    SpO2: 99 %, O2 Device: None (Room air); Intake/Output:   I/O       09/16 0701 - 09/17 0700 09/17 0701 - 09/18 0700 09/18 0701 - 09/19 0700    P  O  525 780     I V  (mL/kg) 118 (1 7)      IV Piggyback 50 100 50    Total Intake(mL/kg) 693 (9 8) 880 (12 7) 50 (0 7)    Urine (mL/kg/hr) 1550 (0 9) 800 (0 5)     Total Output 1550 800     Net -857 +80 +50           Unmeasured Urine Occurrence  5 x             Chest tube Output:  CTs & EPWs have been discontinued    Weights:   Weight (last 2 days)     Date/Time   Weight    09/19/20 0839   68 5 (151 1)    09/18/20 0600   69 5 (153 2)    09/17/20 0600   70 9 (156 31)            Admit weight: 67 9kg - up 1 5kg from admission weight    Results:   NO NEW CBC OR BMP TODAY  Results from last 7 days   Lab Units 09/19/20  0511 09/17/20  0520 09/16/20  1509 09/16/20  0432 09/14/20  8668 09/13/20  0417   WBC Thousand/uL  --   --   --  5 92 5 42 6 10   HEMOGLOBIN g/dL 8 0* 7 8* 8 1* 6 9* 7 0* 7 4*   HEMATOCRIT % 25 1* 24 8* 26 0* 23 0* 22 4* 24 7*   PLATELETS Thousands/uL  --   --   --  253 181 170     Results from last 7 days   Lab Units 09/19/20  0511 09/17/20  0520 09/14/20  0438   SODIUM mmol/L 142 141 141   POTASSIUM mmol/L 4 0 4 2 3 5   CHLORIDE mmol/L 107 106 106   CO2 mmol/L 28 28 29   BUN mg/dL 11 9 8   CREATININE mg/dL 0 49* 0 43* 0 32*   CALCIUM mg/dL 8 8 8 6 8 0*     Results from last 7 days   Lab Units 09/19/20  0511 09/18/20  0517 09/17/20  0520   INR  1 75* 1 91* 1 91*     Coumadin mg 9/17 - 2  9/16 - 3  9/15 - 1  9/14 - 5  9/13 - 5  9/12 - 5              Studies:  No new studies    I have personally reviewed pertinent reports  and I have personally reviewed pertinent films in PACS    Invasive Lines/Tubes:  Invasive Devices     Peripherally Inserted Central Catheter Line            PICC Line 08/26/20 23 days                Physical Exam:    HEENT/NECK:  PERRLA  No jugular venous distention  Cardiac: Regular rate and rhythm and No murmurs/rubs/gallops  Pulmonary:  Breath sounds clear bilaterally and No rales/rhonchi/wheezes  Abdomen:  Non-tender, Non-distended and Normal bowel sounds  Incisions: Sternum is stable  Incision is clean, dry, and intact  Extremities: Extremities warm/dry and Radial pulses 2+ bilaterally  Neuro: Alert and oriented X 3, Sensation is grossly intact and No focal deficits  Skin: Warm/Dry, without rashes or lesions  Assessment:  Principal Problem:    Bacteremia due to Staphylococcus aureus  Active Problems:    Acute bacterial endocarditis    Septic embolism (HCC)    Bipolar 1 disorder (HCC)    Right hip pain    Intravenous drug abuse ([address] )    DVT of axillary vein, acute left ([facility])    Transaminitis    Rash    Anemia    Hyperphosphatemia    S/P TVR (tricuspid valve repair)       Tricuapid Valve Endocarditis   S/P tricuspid valve replacement; POD # 9    Plan:    1  Cardiac:   NSR; Hypotensive  Hold beta blocker for hypotension  Discontinue amiodarone  Continue ASA and coumadin therapy  INR 1 75, dose Coumadin 3 mg tonight  Epicardial pacing wires out  PICC for abx  Continue DVT prophylaxis    2  Pulmonary:   Good Room air oxygen saturation; Continue incentive spirometry/Coughing/Deep breathing exercises  Chest tubes have been discontinued    3  Renal:   Intake/Output net: (-)800 mL/24 hours -- likely more (-)  Discontinue diuretic  Post op Creatinine stable; Follow up labs prn    4  Neuro:  Neurologically intact; No active issues  Pain regimen per APS  Continue Tylenol, 975 mg PO q 8, standing dose  Continue Oxycodone, 2 5 to 5 mg PO q 4 hours prn pain    Continue Dilaudid    Continue Robaxin    Continue Lidocaine patches    Continue Ativan   Continue Cymbalta   Continue Zyprexa   Continue Melatonin   Continue Remeron    5  GI:  Regular diet  Continue stool softeners and prn suppository and Senna  Continue GI prophylaxis    6  Endo:   No history of diabetes; Glucose well-controlled  Discontinue SSIC   Continue Vitamin D supplementation    7    Hematology:    Endocarditis    Afebrile    WBC WNL    Last BC (-)    Ancef through 9/27    ID following   Continue iron/vitamin C supplementation    8  Disposition:      Ambulating independently, Anticipate discharge to home once abx complete 9/27     VTE Pharmacologic Prophylaxis: Fondaparinux (Arixtra)  VTE Mechanical Prophylaxis: sequential compression device    Collaborative rounds completed with ASRAH Cooper    Plan of care discussed with bedside nurse    SIGNATURE: Wilner Tony PA-C  DATE: September 19, 2020  TIME: 9:05 AM

## 2020-09-20 LAB
ANION GAP SERPL CALCULATED.3IONS-SCNC: 4 MMOL/L (ref 4–13)
BUN SERPL-MCNC: 11 MG/DL (ref 5–25)
CALCIUM SERPL-MCNC: 9 MG/DL (ref 8.3–10.1)
CHLORIDE SERPL-SCNC: 107 MMOL/L (ref 100–108)
CO2 SERPL-SCNC: 28 MMOL/L (ref 21–32)
CREAT SERPL-MCNC: 0.59 MG/DL (ref 0.6–1.3)
ERYTHROCYTE [DISTWIDTH] IN BLOOD BY AUTOMATED COUNT: 14.5 % (ref 11.6–15.1)
GFR SERPL CREATININE-BSD FRML MDRD: 126 ML/MIN/1.73SQ M
GLUCOSE SERPL-MCNC: 94 MG/DL (ref 65–140)
HCT VFR BLD AUTO: 28.6 % (ref 34.8–46.1)
HGB BLD-MCNC: 9 G/DL (ref 11.5–15.4)
INR PPP: 1.68 (ref 0.84–1.19)
MCH RBC QN AUTO: 26.9 PG (ref 26.8–34.3)
MCHC RBC AUTO-ENTMCNC: 31.5 G/DL (ref 31.4–37.4)
MCV RBC AUTO: 85 FL (ref 82–98)
PLATELET # BLD AUTO: 413 THOUSANDS/UL (ref 149–390)
PMV BLD AUTO: 8.1 FL (ref 8.9–12.7)
POTASSIUM SERPL-SCNC: 4.4 MMOL/L (ref 3.5–5.3)
PROTHROMBIN TIME: 19.8 SECONDS (ref 11.6–14.5)
RBC # BLD AUTO: 3.35 MILLION/UL (ref 3.81–5.12)
SODIUM SERPL-SCNC: 139 MMOL/L (ref 136–145)
WBC # BLD AUTO: 8.09 THOUSAND/UL (ref 4.31–10.16)

## 2020-09-20 PROCEDURE — 99024 POSTOP FOLLOW-UP VISIT: CPT | Performed by: PHYSICIAN ASSISTANT

## 2020-09-20 PROCEDURE — 80048 BASIC METABOLIC PNL TOTAL CA: CPT | Performed by: PHYSICIAN ASSISTANT

## 2020-09-20 PROCEDURE — 85027 COMPLETE CBC AUTOMATED: CPT | Performed by: PHYSICIAN ASSISTANT

## 2020-09-20 PROCEDURE — 85610 PROTHROMBIN TIME: CPT | Performed by: PHYSICIAN ASSISTANT

## 2020-09-20 RX ORDER — WARFARIN SODIUM 1 MG/1
3 TABLET ORAL
Status: COMPLETED | OUTPATIENT
Start: 2020-09-20 | End: 2020-09-20

## 2020-09-20 RX ORDER — ACETAMINOPHEN 325 MG/1
975 TABLET ORAL EVERY 8 HOURS PRN
Status: DISCONTINUED | OUTPATIENT
Start: 2020-09-20 | End: 2020-09-28 | Stop reason: HOSPADM

## 2020-09-20 RX ADMIN — CALCIUM ACETATE 667 MG: 667 CAPSULE ORAL at 16:39

## 2020-09-20 RX ADMIN — CALCIUM ACETATE 667 MG: 667 CAPSULE ORAL at 11:39

## 2020-09-20 RX ADMIN — Medication 250 MG: at 17:10

## 2020-09-20 RX ADMIN — MIRTAZAPINE 30 MG: 30 TABLET, FILM COATED ORAL at 20:21

## 2020-09-20 RX ADMIN — ACETAMINOPHEN 975 MG: 325 TABLET, FILM COATED ORAL at 03:30

## 2020-09-20 RX ADMIN — MAGNESIUM OXIDE TAB 400 MG (241.3 MG ELEMENTAL MG) 400 MG: 400 (241.3 MG) TAB at 17:10

## 2020-09-20 RX ADMIN — OXYCODONE HYDROCHLORIDE 15 MG: 10 TABLET ORAL at 20:16

## 2020-09-20 RX ADMIN — SENNOSIDES 8.6 MG: 8.6 TABLET ORAL at 09:02

## 2020-09-20 RX ADMIN — ONDANSETRON 4 MG: 2 INJECTION INTRAMUSCULAR; INTRAVENOUS at 09:42

## 2020-09-20 RX ADMIN — FERROUS SULFATE TAB 325 MG (65 MG ELEMENTAL FE) 325 MG: 325 (65 FE) TAB at 09:02

## 2020-09-20 RX ADMIN — METHOCARBAMOL TABLETS 750 MG: 750 TABLET, COATED ORAL at 16:09

## 2020-09-20 RX ADMIN — ASPIRIN 81 MG CHEWABLE TABLET 81 MG: 81 TABLET CHEWABLE at 09:02

## 2020-09-20 RX ADMIN — CEFAZOLIN SODIUM 2000 MG: 2 SOLUTION INTRAVENOUS at 23:47

## 2020-09-20 RX ADMIN — DULOXETINE HYDROCHLORIDE 60 MG: 60 CAPSULE, DELAYED RELEASE ORAL at 09:01

## 2020-09-20 RX ADMIN — Medication 250 MG: at 09:03

## 2020-09-20 RX ADMIN — OXYCODONE HYDROCHLORIDE 15 MG: 10 TABLET ORAL at 16:08

## 2020-09-20 RX ADMIN — OXYCODONE HYDROCHLORIDE AND ACETAMINOPHEN 500 MG: 500 TABLET ORAL at 09:03

## 2020-09-20 RX ADMIN — LORAZEPAM 1 MG: 2 INJECTION INTRAMUSCULAR; INTRAVENOUS at 11:13

## 2020-09-20 RX ADMIN — ONDANSETRON 4 MG: 2 INJECTION INTRAMUSCULAR; INTRAVENOUS at 00:34

## 2020-09-20 RX ADMIN — CEFAZOLIN SODIUM 2000 MG: 2 SOLUTION INTRAVENOUS at 09:02

## 2020-09-20 RX ADMIN — MAGNESIUM OXIDE TAB 400 MG (241.3 MG ELEMENTAL MG) 400 MG: 400 (241.3 MG) TAB at 09:02

## 2020-09-20 RX ADMIN — PANTOPRAZOLE SODIUM 40 MG: 40 TABLET, DELAYED RELEASE ORAL at 09:02

## 2020-09-20 RX ADMIN — HYDROMORPHONE HYDROCHLORIDE 1 MG: 1 INJECTION, SOLUTION INTRAMUSCULAR; INTRAVENOUS; SUBCUTANEOUS at 17:11

## 2020-09-20 RX ADMIN — Medication 1000 UNITS: at 09:01

## 2020-09-20 RX ADMIN — METHOCARBAMOL TABLETS 750 MG: 750 TABLET, COATED ORAL at 09:01

## 2020-09-20 RX ADMIN — SENNOSIDES 8.6 MG: 8.6 TABLET ORAL at 17:10

## 2020-09-20 RX ADMIN — OLANZAPINE 5 MG: 5 TABLET, FILM COATED ORAL at 20:21

## 2020-09-20 RX ADMIN — HYDROMORPHONE HYDROCHLORIDE 1 MG: 1 INJECTION, SOLUTION INTRAMUSCULAR; INTRAVENOUS; SUBCUTANEOUS at 21:15

## 2020-09-20 RX ADMIN — CEFAZOLIN SODIUM 2000 MG: 2 SOLUTION INTRAVENOUS at 16:39

## 2020-09-20 RX ADMIN — HYDROMORPHONE HYDROCHLORIDE 1 MG: 1 INJECTION, SOLUTION INTRAMUSCULAR; INTRAVENOUS; SUBCUTANEOUS at 00:38

## 2020-09-20 RX ADMIN — CALCIUM ACETATE 667 MG: 667 CAPSULE ORAL at 09:02

## 2020-09-20 RX ADMIN — ALTEPLASE 2 MG: 2.2 INJECTION, POWDER, LYOPHILIZED, FOR SOLUTION INTRAVENOUS at 10:01

## 2020-09-20 RX ADMIN — WARFARIN SODIUM 3 MG: 1 TABLET ORAL at 17:10

## 2020-09-20 RX ADMIN — DOCUSATE SODIUM 100 MG: 100 CAPSULE, LIQUID FILLED ORAL at 09:03

## 2020-09-20 RX ADMIN — MELATONIN 6 MG: at 20:21

## 2020-09-20 RX ADMIN — HYDROMORPHONE HYDROCHLORIDE 1 MG: 1 INJECTION, SOLUTION INTRAMUSCULAR; INTRAVENOUS; SUBCUTANEOUS at 10:01

## 2020-09-20 RX ADMIN — FONDAPARINUX SODIUM 2.5 MG: 2.5 INJECTION, SOLUTION SUBCUTANEOUS at 11:39

## 2020-09-20 RX ADMIN — OXYCODONE HYDROCHLORIDE 15 MG: 10 TABLET ORAL at 09:02

## 2020-09-20 NOTE — PROGRESS NOTES
Progress Note - Cardiothoracic Surgery   Brit Gaitan 34 y o  female MRN: [id number]  Unit/Bed#: [facility] 421-01 Encounter: [id number]    Tricuspid Valve Endocarditis  S/P tricuspid valve replacement; POD # 10    24 Hour Events: No issues overnight  Patient complaining of disturbed sleep cycle due to medications (standing tylenol ordered for 0400)  Pain overall well controlled with current regimen       Medications:   Scheduled Meds:  Current Facility-Administered Medications   Medication Dose Route Frequency Provider Last Rate    acetaminophen  975 mg Oral Q8H Deisy Judd PA-C      ascorbic acid  500 mg Oral Daily Cesario Cantor PA-C      aspirin  81 mg Oral Daily Melvi DeaMILLIE christine      bisacodyl  10 mg Rectal Daily PRN Melvi Salazar PA-C      calcium acetate  667 mg Oral TID With Meals Melvi Salazar PA-C      calcium carbonate  1,000 mg Oral Daily PRN Melvi Salazar PA-C      cefazolin  2,000 mg Intravenous Q8H Wiliam Álvarez MD 2,000 mg (09/19/20 9202)    cholecalciferol  1,000 Units Oral Daily Vinton Romberg, MD      dicyclomine  10 mg Oral TID PRN Melvi Salazar PA-C      docusate sodium  100 mg Oral BID Melvi Salazar PA-C      DULoxetine  60 mg Oral Daily Melvi Salazar PA-C      ergocalciferol  50,000 Units Oral Once per day on Mon Wed Fri Lindsay Marcano PA-C      ferrous sulfate  325 mg Oral Daily With Breakfast Amara Browning PA-C      fondaparinux  2 5 mg Subcutaneous Q24H Amara Browning PA-C      glycopyrrolate  0 2 mg Intravenous Q4H PRN Melvi Salazar PA-C      HYDROmorphone  1 mg Intravenous Q4H PRN Kyra Mejia PA-C      iohexol  50 mL Oral 90 min pre-procedure Melvi Salazar PA-C      lidocaine  2 patch Topical Daily Melvi Salazar PA-C      LORazepam  1 mg Intravenous Q2H PRN Kirsty See PA-C      magnesium oxide  400 mg Oral BID Amaar Browning PA-C      melatonin  6 mg Oral HS Cesario Cantor PA-C      methocarbamol  750 mg Oral [facility] Dave Lombard, PA-C      miconazole   Topical BID Sharon Te, MILLIE      mirtazapine  30 mg Oral HS Amara Browning PA-C      OLANZapine  5 mg Oral HS Amara Browning PA-C      ondansetron  4 mg Intravenous Q6H PRN Sharon Te, MILLIE      oxyCODONE  15 mg Oral Q4H PRN Sharon Te, MILLIE      pantoprazole  40 mg Oral Daily Sharon Te, MILLIE      polyethylene glycol  17 g Oral Daily Sharon Te, MILLIE      saccharomyces boulardii  250 mg Oral BID Sharon Te, MILLIE      senna  1 tablet Oral BID Sharon Te, MILLIE       Continuous Infusions:   PRN Meds: bisacodyl    calcium carbonate    dicyclomine    glycopyrrolate    HYDROmorphone    LORazepam    ondansetron    oxyCODONE    Vitals:   Vitals:    09/19/20 1538 09/19/20 1932 09/19/20 2217 09/19/20 2243   BP: 106/58 128/71  98/55   BP Location: Left arm Left arm  Left arm   Pulse: 72 90  64   Resp: 18 18  18   Temp: 97 9 °F (36 6 °C) 98 3 °F (36 8 °C)  97 7 °F (36 5 °C)   TempSrc: Oral Oral  Oral   SpO2: 99% 99% 99% 99%   Weight:       Height:         Bp x24hrs: /50-60    Telemetry: NSR; Heart Rate: 82    Respiratory:    SpO2: 99 %, O2 Device: None (Room air); Intake/Output:   I/O       09/16 0701 - 09/17 0700 09/17 0701 - 09/18 0700 09/18 0701 - 09/19 0700    P  O  525 780     I V  (mL/kg) 118 (1 7)      IV Piggyback 50 100 50    Total Intake(mL/kg) 693 (9 8) 880 (12 7) 50 (0 7)    Urine (mL/kg/hr) 1550 (0 9) 800 (0 5)     Total Output 1550 800     Net -857 +80 +50           Unmeasured Urine Occurrence  5 x             Chest tube Output:  CTs & EPWs have been discontinued    Weights:   Weight (last 2 days)     Date/Time   Weight    09/19/20 0839   68 5 (151 1)    09/18/20 0600   69 5 (153 2)                Results:   NO NEW CBC OR BMP TODAY  Results from last 7 days   Lab Units 09/19/20  0511 09/17/20  0520 09/16/20  1509 09/16/20  0432 09/14/20  0853   WBC Thousand/uL  --   --   -- 5  92 5 42   HEMOGLOBIN g/dL 8 0* 7 8* 8 1* 6 9* 7 0*   HEMATOCRIT % 25 1* 24 8* 26 0* 23 0* 22 4*   PLATELETS Thousands/uL  --   --   --  253 181     Results from last 7 days   Lab Units 09/19/20  0511 09/17/20  0520 09/14/20  0438   SODIUM mmol/L 142 141 141   POTASSIUM mmol/L 4 0 4 2 3 5   CHLORIDE mmol/L 107 106 106   CO2 mmol/L 28 28 29   BUN mg/dL 11 9 8   CREATININE mg/dL 0 49* 0 43* 0 32*   CALCIUM mg/dL 8 8 8 6 8 0*     Results from last 7 days   Lab Units 09/19/20  0511 09/18/20  0517 09/17/20  0520   INR  1 75* 1 91* 1 91*     Coumadin mg 9/17 - 2  9/16 - 3  9/15 - 1  9/14 - 5  9/13 - 5  9/12 - 5              Studies:  No new studies    I have personally reviewed pertinent reports  and I have personally reviewed pertinent films in PACS    Invasive Lines/Tubes:  Invasive Devices     Peripherally Inserted Central Catheter Line            PICC Line 08/26/20 24 days                  Assessment:  Principal Problem:    Bacteremia due to Staphylococcus aureus  Active Problems:    Acute bacterial endocarditis    Septic embolism (HCC)    Bipolar 1 disorder (HCC)    Right hip pain    Intravenous drug abuse ([address] )    DVT of axillary vein, acute left (HCC)    Transaminitis    Rash    Anemia    Hyperphosphatemia    S/P TVR (tricuspid valve repair)       Tricuapid Valve Endocarditis  S/P tricuspid valve replacement; POD # 10    Plan:    1  Cardiac:   NSR; Hypotensive  Hold beta blocker for hypotension  Discontinue amiodarone  Continue ASA and coumadin therapy  INR pending, will dose today  Epicardial pacing wires out  PICC for abx  Continue DVT prophylaxis    2  Pulmonary:   Good Room air oxygen saturation; Continue incentive spirometry/Coughing/Deep breathing exercises  Chest tubes have been discontinued    3  Renal:   Post op Creatinine stable; Follow up labs prn    4  Neuro:  Neurologically intact;  No active issues  Pain regimen per APS  Continue Tylenol, 975 mg PO q 8 PRN  Continue Oxycodone, 2 5 to 5 mg PO q 4 hours prn pain    Continue Dilaudid    Continue Robaxin    Continue Lidocaine patches    Continue Ativan   Continue Cymbalta   Continue Zyprexa   Continue Melatonin   Continue Remeron    5  GI:  Regular diet  Continue stool softeners and prn suppository and Senna  Continue GI prophylaxis    6  Endo:   No history of diabetes; Glucose well-controlled  Discontinue SSIC   Continue Vitamin D supplementation    7    Hematology:    Endocarditis    Afebrile    WBC WNL    Last BC (-)    Ancef through 9/27    ID following   Continue iron/vitamin C supplementation    8  Disposition:      Ambulating independently, Anticipate discharge to home once abx complete 9/27     VTE Pharmacologic Prophylaxis: Fondaparinux (Arixtra)  VTE Mechanical Prophylaxis: sequential compression device    Collaborative rounds completed with SARAH Walls    Plan of care discussed with bedside nurse    SIGNATURE: Stephani Jade PA-C  DATE: September 20, 2020  TIME: 8:42 AM

## 2020-09-21 LAB
ALBUMIN SERPL BCP-MCNC: 3 G/DL (ref 3.5–5)
ALP SERPL-CCNC: 119 U/L (ref 46–116)
ALT SERPL W P-5'-P-CCNC: 8 U/L (ref 12–78)
AST SERPL W P-5'-P-CCNC: 15 U/L (ref 5–45)
BILIRUB DIRECT SERPL-MCNC: 0.1 MG/DL (ref 0–0.2)
BILIRUB SERPL-MCNC: 0.22 MG/DL (ref 0.2–1)
CRP SERPL QL: 15.4 MG/L
ERYTHROCYTE [SEDIMENTATION RATE] IN BLOOD: 11 MM/HOUR (ref 0–19)
INR PPP: 1.7 (ref 0.84–1.19)
PROT SERPL-MCNC: 6.9 G/DL (ref 6.4–8.2)
PROTHROMBIN TIME: 19.9 SECONDS (ref 11.6–14.5)

## 2020-09-21 PROCEDURE — 99024 POSTOP FOLLOW-UP VISIT: CPT | Performed by: THORACIC SURGERY (CARDIOTHORACIC VASCULAR SURGERY)

## 2020-09-21 PROCEDURE — 85610 PROTHROMBIN TIME: CPT | Performed by: PHYSICIAN ASSISTANT

## 2020-09-21 PROCEDURE — 99232 SBSQ HOSP IP/OBS MODERATE 35: CPT | Performed by: PHYSICIAN ASSISTANT

## 2020-09-21 PROCEDURE — 99233 SBSQ HOSP IP/OBS HIGH 50: CPT | Performed by: INTERNAL MEDICINE

## 2020-09-21 PROCEDURE — 86140 C-REACTIVE PROTEIN: CPT | Performed by: THORACIC SURGERY (CARDIOTHORACIC VASCULAR SURGERY)

## 2020-09-21 PROCEDURE — 80076 HEPATIC FUNCTION PANEL: CPT | Performed by: PHYSICIAN ASSISTANT

## 2020-09-21 PROCEDURE — 85652 RBC SED RATE AUTOMATED: CPT | Performed by: INTERNAL MEDICINE

## 2020-09-21 RX ORDER — OXYCODONE HYDROCHLORIDE 10 MG/1
10 TABLET ORAL EVERY 4 HOURS PRN
Status: DISCONTINUED | OUTPATIENT
Start: 2020-09-21 | End: 2020-09-26

## 2020-09-21 RX ORDER — WARFARIN SODIUM 1 MG/1
3 TABLET ORAL
Status: COMPLETED | OUTPATIENT
Start: 2020-09-21 | End: 2020-09-21

## 2020-09-21 RX ADMIN — CALCIUM ACETATE 667 MG: 667 CAPSULE ORAL at 09:22

## 2020-09-21 RX ADMIN — HYDROMORPHONE HYDROCHLORIDE 1 MG: 1 INJECTION, SOLUTION INTRAMUSCULAR; INTRAVENOUS; SUBCUTANEOUS at 10:48

## 2020-09-21 RX ADMIN — MELATONIN 6 MG: at 19:46

## 2020-09-21 RX ADMIN — LORAZEPAM 1 MG: 2 INJECTION INTRAMUSCULAR; INTRAVENOUS at 21:27

## 2020-09-21 RX ADMIN — MAGNESIUM OXIDE TAB 400 MG (241.3 MG ELEMENTAL MG) 400 MG: 400 (241.3 MG) TAB at 09:22

## 2020-09-21 RX ADMIN — OXYCODONE HYDROCHLORIDE AND ACETAMINOPHEN 500 MG: 500 TABLET ORAL at 09:22

## 2020-09-21 RX ADMIN — OLANZAPINE 5 MG: 5 TABLET, FILM COATED ORAL at 19:46

## 2020-09-21 RX ADMIN — ERGOCALCIFEROL 50000 UNITS: 1.25 CAPSULE ORAL at 09:26

## 2020-09-21 RX ADMIN — METHOCARBAMOL TABLETS 750 MG: 750 TABLET, COATED ORAL at 17:12

## 2020-09-21 RX ADMIN — CALCIUM ACETATE 667 MG: 667 CAPSULE ORAL at 11:35

## 2020-09-21 RX ADMIN — ONDANSETRON 4 MG: 2 INJECTION INTRAMUSCULAR; INTRAVENOUS at 09:58

## 2020-09-21 RX ADMIN — LORAZEPAM 1 MG: 2 INJECTION INTRAMUSCULAR; INTRAVENOUS at 12:36

## 2020-09-21 RX ADMIN — Medication 250 MG: at 09:22

## 2020-09-21 RX ADMIN — FONDAPARINUX SODIUM 2.5 MG: 2.5 INJECTION, SOLUTION SUBCUTANEOUS at 11:35

## 2020-09-21 RX ADMIN — Medication 250 MG: at 17:13

## 2020-09-21 RX ADMIN — SENNOSIDES 8.6 MG: 8.6 TABLET ORAL at 17:13

## 2020-09-21 RX ADMIN — CEFAZOLIN SODIUM 2000 MG: 2 SOLUTION INTRAVENOUS at 17:06

## 2020-09-21 RX ADMIN — Medication 1000 UNITS: at 09:22

## 2020-09-21 RX ADMIN — MIRTAZAPINE 30 MG: 30 TABLET, FILM COATED ORAL at 19:46

## 2020-09-21 RX ADMIN — FERROUS SULFATE TAB 325 MG (65 MG ELEMENTAL FE) 325 MG: 325 (65 FE) TAB at 09:22

## 2020-09-21 RX ADMIN — PANTOPRAZOLE SODIUM 40 MG: 40 TABLET, DELAYED RELEASE ORAL at 09:22

## 2020-09-21 RX ADMIN — SENNOSIDES 8.6 MG: 8.6 TABLET ORAL at 09:22

## 2020-09-21 RX ADMIN — ASPIRIN 81 MG CHEWABLE TABLET 81 MG: 81 TABLET CHEWABLE at 09:22

## 2020-09-21 RX ADMIN — METHOCARBAMOL TABLETS 750 MG: 750 TABLET, COATED ORAL at 09:21

## 2020-09-21 RX ADMIN — OXYCODONE HYDROCHLORIDE 10 MG: 10 TABLET ORAL at 18:00

## 2020-09-21 RX ADMIN — MICONAZOLE NITRATE: 2 CREAM TOPICAL at 17:14

## 2020-09-21 RX ADMIN — LORAZEPAM 1 MG: 2 INJECTION INTRAMUSCULAR; INTRAVENOUS at 00:42

## 2020-09-21 RX ADMIN — OXYCODONE HYDROCHLORIDE 15 MG: 10 TABLET ORAL at 09:35

## 2020-09-21 RX ADMIN — MAGNESIUM OXIDE TAB 400 MG (241.3 MG ELEMENTAL MG) 400 MG: 400 (241.3 MG) TAB at 17:13

## 2020-09-21 RX ADMIN — DULOXETINE HYDROCHLORIDE 60 MG: 60 CAPSULE, DELAYED RELEASE ORAL at 09:22

## 2020-09-21 RX ADMIN — WARFARIN SODIUM 3 MG: 1 TABLET ORAL at 17:12

## 2020-09-21 RX ADMIN — HYDROMORPHONE HYDROCHLORIDE 1 MG: 1 INJECTION, SOLUTION INTRAMUSCULAR; INTRAVENOUS; SUBCUTANEOUS at 19:46

## 2020-09-21 RX ADMIN — CEFAZOLIN SODIUM 2000 MG: 2 SOLUTION INTRAVENOUS at 09:22

## 2020-09-21 RX ADMIN — CALCIUM ACETATE 667 MG: 667 CAPSULE ORAL at 17:12

## 2020-09-21 NOTE — PROGRESS NOTES
Progress Note - Cardiothoracic Surgery   Francisco Crowe 34 y o  female MRN: [id number]  Unit/Bed#: [facility] 421-01 Encounter: [id number]    Tricuspid Valve Endocarditis  S/P tricuspid valve repair; POD # 11    24 Hour Events: No events  C/o continual RLE pain      Medications:   Scheduled Meds:  Current Facility-Administered Medications   Medication Dose Route Frequency Provider Last Rate    acetaminophen  975 mg Oral Q8H PRN Arden Olvera PA-C      ascorbic acid  500 mg Oral Daily Demetria Ribera PA-C      aspirin  81 mg Oral Daily Vasile Armijo PA-C      bisacodyl  10 mg Rectal Daily PRN Vasile Armijo PA-C      calcium acetate  667 mg Oral TID With Meals Vasile Armijo PA-C      calcium carbonate  1,000 mg Oral Daily PRN Vasile Armijo PA-C      cefazolin  2,000 mg Intravenous Q8H Wiliam Álvarez MD 2,000 mg (09/20/20 7025)    cholecalciferol  1,000 Units Oral Daily Kamaljit Gómez MD      dicyclomine  10 mg Oral TID PRN Vasile Armijo PA-C      docusate sodium  100 mg Oral BID Vasile Armijo PA-C      DULoxetine  60 mg Oral Daily Vasile Armijo PA-C      ergocalciferol  50,000 Units Oral Once per day on Mon Wed Fri Lindsay Marcano PA-C      ferrous sulfate  325 mg Oral Daily With Breakfast Amara Browning PA-C      fondaparinux  2 5 mg Subcutaneous Q24H Amara Browning PA-C      glycopyrrolate  0 2 mg Intravenous Q4H PRN Vasile Armijo PA-C      HYDROmorphone  1 mg Intravenous Q4H PRN Arden Olvera PA-C      iohexol  50 mL Oral 90 min pre-procedure Vasile Armijo PA-C      lidocaine  2 patch Topical Daily Vasile Armijo PA-C      LORazepam  1 mg Intravenous Q2H PRN Holli eSe PA-C      magnesium oxide  400 mg Oral BID Demetria Ribera PA-C      melatonin  6 mg Oral HS Demetria Ribera PA-C      methocarbamol  750 mg Oral [facility] Arden Olvera PA-C      miconazole   Topical BID Vasile Armijo PA-C      mirtazapine  30 mg Oral HS Amara PARR MILLIE Browning      OLANZapine  5 mg Oral HS Amara PARR MILLIE Browning      ondansetron  4 mg Intravenous Q6H PRN Pat Severino PA-C      oxyCODONE  15 mg Oral Q4H PRN Pat Severino PA-C      pantoprazole  40 mg Oral Daily Pat Severino PA-C      polyethylene glycol  17 g Oral Daily Pat Severino PA-C      saccharomyces boulardii  250 mg Oral BID Pat Severino PA-C      senna  1 tablet Oral BID Pat Severino PA-C       Continuous Infusions:   PRN Meds:   acetaminophen    bisacodyl    calcium carbonate    dicyclomine    glycopyrrolate    HYDROmorphone    LORazepam    ondansetron    oxyCODONE    Vitals:   Vitals:    09/20/20 1531 09/20/20 1850 09/20/20 2126 09/21/20 0438   BP: 102/53 100/52 94/55 96/50   BP Location: Left arm Left arm Right arm Left arm   Pulse: 71 72 70 68   Resp: 18 18 16 16   Temp: 98 2 °F (36 8 °C) 98 2 °F (36 8 °C) 98 1 °F (36 7 °C) 98 °F (36 7 °C)   TempSrc: Oral Oral Oral Oral   SpO2: 98% 98% 97% 96%   Weight:       Height:         Bp x24hrs: /50-70    Telemetry: NSR; Heart Rate: 60    Respiratory:   SpO2: SpO2: 96 %, SpO2 Activity: SpO2 Activity: At Rest, SpO2 Device: O2 Device: None (Room air); Room Air    Intake/Output:   I/O       09/19 0701 - 09/20 0700 09/20 0701 - 09/21 0700 09/21 0701 - 09/22 0700    P  O  300 300     IV Piggyback 100 50     Total Intake(mL/kg) 400 (5 8) 350 (5 1)     Urine (mL/kg/hr)  750 (0 5)     Stool  0     Total Output  750     Net +400 -400            Unmeasured Urine Occurrence 5 x 4 x     Unmeasured Stool Occurrence  1 x         UOP - none recorded/8hrs; 750cc/24hrs w/ 2 unmeasured shifts & 4 unmeasured occurances    Chest tube Output:  CTs & EPWs have been discontinued    Weights:   Weight (last 2 days)     Date/Time   Weight    09/20/20 0854   68 3 (150 6)    09/19/20 0839   68 5 (151 1)            Admit weight: 67 9kg - up 0 5kg from admission weight    Results:   NO NEW CBC OR BMP TODAY  PT/INR, LFTS, CRP, ESR PENDING  Results from last 7 days   Lab Units 09/20/20  0900 09/19/20  0511 09/17/20  0520  09/16/20  0432 09/14/20  0853   WBC Thousand/uL 8 09  --   --   --  5 92 5 42   HEMOGLOBIN g/dL 9 0* 8 0* 7 8*   < > 6 9* 7 0*   HEMATOCRIT % 28 6* 25 1* 24 8*   < > 23 0* 22 4*   PLATELETS Thousands/uL 413*  --   --   --  253 181    < > = values in this interval not displayed  Results from last 7 days   Lab Units 09/20/20  0900 09/19/20  0511 09/17/20  0520   SODIUM mmol/L 139 142 141   POTASSIUM mmol/L 4 4 4 0 4 2   CHLORIDE mmol/L 107 107 106   CO2 mmol/L 28 28 28   BUN mg/dL 11 11 9   CREATININE mg/dL 0 59* 0 49* 0 43*   CALCIUM mg/dL 9 0 8 8 8 6     Results from last 7 days   Lab Units 09/20/20  0900 09/19/20  0511 09/18/20  0517   INR  1 68* 1 75* 1 91*     Coumadin mg 9/19 - 3  9/18 - 3  9/17 - 2  9/16 - 3  9/15 - 1  9/14 - 5  9/13 - 5  9/12 - 5          Point of care glucose: not being checked    Studies:  No new studies    I have personally reviewed pertinent reports  and I have personally reviewed pertinent films in PACS    Invasive Lines/Tubes:  Invasive Devices     Peripherally Inserted Central Catheter Line            PICC Line 08/26/20 25 days                Physical Exam:    HEENT/NECK:  PERRLA  No jugular venous distention  Cardiac: Regular rate and rhythm  Pulmonary:  Breath sounds clear bilaterally  Abdomen:  Normal bowel sounds  Incisions: Sternum is stable  Incision is clean, dry, and intact  Extremities: Extremities warm/dry and Trace edema B/L  Neuro: Alert and oriented X 3, Sensation is grossly intact and No focal deficits  Skin: Warm/Dry, without rashes or lesions      Assessment:  Principal Problem:    Bacteremia due to Staphylococcus aureus  Active Problems:    Acute bacterial endocarditis    Septic embolism (HCC)    Bipolar 1 disorder (HCC)    Right hip pain    Intravenous drug abuse ([address] )    DVT of axillary vein, acute left (HCC)    Transaminitis    Rash    Anemia    Hyperphosphatemia S/P TVR (tricuspid valve repair)       Tricuspid Valve Endocarditis  S/P tricuspid valve repair; POD # 11    Plan:    1  Cardiac:   NSR; HR/BP well-controlled  No beta blocker for prior hypotension  Continue ASA therapy  Statin not indicated  INR pending, dose Coumadin tonight  Epicardial pacing wires out  PICC for abx  Continue DVT prophylaxis    2  Pulmonary:   Good Room air oxygen saturation; Continue incentive spirometry/Coughing/Deep breathing exercises  Chest tubes have been discontinued    3  Renal:   Intake/Output net: (-)400 mL/24 hours -- likely more (-) given innacurate I/Os  Autodiuresing well    4  Neuro:  Neurologically intact; No active issues  Incisional pain well-controlled  Continue Tylenol, 975 mg PO q 8, standing dose  Continue Oxycodone, prn pain  PRN Dilaudid per APS  Continue Robaxin per APS    Continue Lidoderm   Continue Remeron   Continue Zyprexa    5  GI:  Tolerating TLC 2 3 gm sodium diet  Maintain 1800 mL daily fluid restriction   Continue stool softeners and prn suppository  Continue GI prophylaxis    6  Endo:   No history of diabetes; Glucose well-controlled with sliding scale coverage    7    Hematology:    Continue iron/vitmain C supplementation   Hyperphosphatemia, endo following   Vitamin D deficiency, continue supplementation   Endocarditis    Afebrile    ID following    Ancef thorough 9 /27 -- need to determine LD as for discharge 9/27 vs 9/28    Labs pending for sacroilitis    8  Disposition:      Ambulating independently, Anticipate discharge to home once abx complete     VTE Pharmacologic Prophylaxis: Fondaparinux (Arixtra)  VTE Mechanical Prophylaxis: sequential compression device    Collaborative rounds completed with SARAH Faye    Plan of care discussed with bedside nurse    SIGNATURE: Sandra Torres PA-C  DATE: September 21, 2020  TIME: 8:45 AM

## 2020-09-21 NOTE — PROGRESS NOTES
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:  Prolonged course of IV antibiotic was previously recommended, but patient has left AMA on 2 prior occasions   She remains hemodynamically stable   Repeat blood culture negative on 08/15/2020; operative culture also negative  -continue cefazolin 2 g IV q 8 hours  through 9/27/2020 to complete 6 weeks from the 1st negative blood culture  -patient is postop day 11 for tricuspid valve surgery; operative culture negative  -check CBC with diff and CMP weekly while on the IV antibiotics     2  Tricuspid valve endocarditis, 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:  MELISSA done 07/21/2020 showed large vegetation on TV with severe regurgitation  7/15 CT abdomen/pelvis also showed bilateral renal parenchymal abnormalities concerning for septic emboli, patient also had right-sided loculated pleural effusion   No intra-abdominal abscess   Repeat CT chest 09/04/2020 shows resolution of right-sided pleural effusion  Patient is status post repair of tricuspid valve with annuloplasty on 09/10/2020; sternal wound shows no dehiscence, no drainage or erythema   -antibiotic plan as above  -close CT surgery follow-up     3   Right iliacus muscle abscesses/sacroiliitis:  CT scan of right lower extremity on 08/21/2020 showed 9 mm iliacus muscle collection, decreasing in size   Follow-up imaging with MRI done 08/31/2020 with evidence of sacroiliitis that is probably septic   There are no destructive changes or fluid collection   Suspect this is the cause of the patient's ongoing pain   Inflammatory markers decreased to  near normal levels, though increased again when checked immediately after  her recent sternotomy and valvular surgery   -antibiotics as above  - despite improvement in inflammatory markers, patient continues to complain of increasing right buttock pain, worsening upon ambulation and upon flexion and extension of the back; will repeat MRI sacroiliac joint, including right iliacus muscle to rule out any intramuscular collection or abscess associated with sacroiliac joint septic arthritis          4  Active IV heroin abuse:   This is the causative factor for development of bacteremia and infective endocarditis   Patient has left AMA on prior occasions   HIV screen negative  -patient is not a candidate for at home PICC line/IV antibiotics  -acute pain service follow-up     5  Chronic HCV infection:   Recent HIV was negative   The LFTs are waxing waning  Review of Care everywhere shows high hepatitis C viral load 2020   -monitor LFTs   - outpatient follow-up with GI     6  Left upper extremity DVT:  in the setting of PICC line use   Patient now on anticoagulation  -anticoagulation as per primary and vascular team        Discussed the above management plan with the primary service  Plan mentioned above discussed with the patient     Antibiotics:  Cefazolin restart 42  Negative blood cultures 34  Postop day 11    Subjective:  Patient has no fever, chills, sweats; no nausea, vomiting, diarrhea; no cough, shortness of breath; complaining of worsening right buttock pain    Objective:  Vitals:  Temp:  [97 8 °F (36 6 °C)-98 2 °F (36 8 °C)] 97 8 °F (36 6 °C)  HR:  [68-86] 86  Resp:  [16-18] 17  BP: ()/(50-59) 121/59  SpO2:  [96 %-98 %] 96 %  Temp (24hrs), Av 1 °F (36 7 °C), Min:97 8 °F (36 6 °C), Max:98 2 °F (36 8 °C)  Current: Temperature: 97 8 °F (36 6 °C)    Physical Exam:   General Appearance:  Alert, interactive, nontoxic, no acute distress  Throat: Oropharynx moist without lesions  Lungs:   Clear to auscultation bilaterally; no wheezes, rhonchi or rales; respirations unlabored   Heart:  RRR; no murmur, rub or gallop   Abdomen:   Soft, non-tender, non-distended, positive bowel sounds  Extremities: No clubbing, cyanosis or edema   Skin: No new rashes or lesions  No draining wounds noted  Labs, Imaging, & Other studies:   All pertinent labs and imaging studies were personally reviewed  Results from last 7 days   Lab Units 09/20/20  0900 09/19/20  0511 09/17/20  0520 09/16/20  0432   WBC Thousand/uL 8 09  --   --   --  5 92   HEMOGLOBIN g/dL 9 0* 8 0* 7 8*   < > 6 9*   PLATELETS Thousands/uL 413*  --   --   --  253    < > = values in this interval not displayed       Results from last 7 days   Lab Units 09/21/20  0909 09/20/20  0900 09/19/20  0511 09/17/20  0520   SODIUM mmol/L  --  139 142 141   POTASSIUM mmol/L  --  4 4 4 0 4 2   CHLORIDE mmol/L  --  107 107 106   CO2 mmol/L  --  28 28 28   BUN mg/dL  --  11 11 9   CREATININE mg/dL  --  0 59* 0 49* 0 43*   EGFR ml/min/1 73sq m  --  126 134 140   CALCIUM mg/dL  --  9 0 8 8 8 6   AST U/L 15  --   --   --    ALT U/L 8*  --   --   --    ALK PHOS U/L 119*  --   --   --              Results from last 7 days   Lab Units 09/21/20  0909   CRP mg/L 15 4*

## 2020-09-21 NOTE — PROGRESS NOTES
Progress Note - Acute Pain Service    Delmis Mendosa 34 y o  female MRN: [id number]  Unit/Bed#: [facility] 421-01 Encounter: [id number]      Assessment:   Principal Problem:    Bacteremia due to Staphylococcus aureus  Active Problems:    Acute bacterial endocarditis    Septic embolism (HCC)    Bipolar 1 disorder ([facility] [address] )    Right hip pain    Intravenous drug abuse ([facility] )    DVT of axillary vein, acute left (HCC)    Transaminitis    Rash    Anemia    Hyperphosphatemia    S/P TVR (tricuspid valve repair)    Delmis Mendosa is a 34 y o  female  admitted with bacteremia, endocarditis with subsequent TVR  History of opioid use disorder  Plan:   Decrease oxycodone to 10 mg p o  q 4 hours p r n  severe pain  Continue hydromorphone 1 mg IV q 4 hours p r n  breakthrough pain  Continue acetaminophen 975 mg p o  q 8 hours p r n  mild pain  Continue duloxetine 60 mg p o  daily scheduled  Continue lorazepam 1 mg IV q 2 hours p r n  anxiety  Continue Zyprexa 5 mg p o  daily HS  Continue methocarbamol 750 mg p o  q 8 hours scheduled  Continue lidocaine 5% patch, 2 patches for 12 hours daily       - Docusate (Colace) 100 mg PO twice daily  - Senna 1 tablet PO qhs    APS will continue to follow  Please call  / [phone] or [facility] Acute Pain Service - [facility] (/ between 1247-1659 and on weekends) with questions or concerns    Pain History  Current pain location(s):  Right hip, right lower back  Pain Scale:   6/10  Quality:  Sore  24 hour history:  Pain in right hip and right lower back improving however still present per patient  Ambulating much better and was walking independently in the hallway on my arrival   Patient's spirits continue to improve  Patient tolerating opioid and benzodiazepine wean well      Opioid requirement previous 24 hours:  Hydromorphone 4 mg IV, oxycodone 60 mg p o     Meds/Allergies   all current active meds have been reviewed, current meds:   Current Facility-Administered Medications   Medication Dose Route Frequency    acetaminophen (TYLENOL) tablet 975 mg  975 mg Oral Q8H PRN    ascorbic acid (VITAMIN C) tablet 500 mg  500 mg Oral Daily    aspirin chewable tablet 81 mg  81 mg Oral Daily    bisacodyl (DULCOLAX) rectal suppository 10 mg  10 mg Rectal Daily PRN    calcium acetate (PHOSLO) capsule 667 mg  667 mg Oral TID With Meals    calcium carbonate (TUMS) chewable tablet 1,000 mg  1,000 mg Oral Daily PRN    ceFAZolin (ANCEF) IVPB (premix) 2,000 mg 50 mL  2,000 mg Intravenous Q8H    cholecalciferol (VITAMIN D3) tablet 1,000 Units  1,000 Units Oral Daily    dicyclomine (BENTYL) capsule 10 mg  10 mg Oral TID PRN    docusate sodium (COLACE) capsule 100 mg  100 mg Oral BID    DULoxetine (CYMBALTA) delayed release capsule 60 mg  60 mg Oral Daily    ergocalciferol (VITAMIN D2) capsule 50,000 Units  50,000 Units Oral Once per day on Mon Wed Fri    ferrous sulfate tablet 325 mg  325 mg Oral Daily With Breakfast    fondaparinux (ARIXTRA) subcutaneous injection 2 5 mg  2 5 mg Subcutaneous Q24H    glycopyrrolate (ROBINUL) injection 0 2 mg  0 2 mg Intravenous Q4H PRN    HYDROmorphone (DILAUDID) injection 1 mg  1 mg Intravenous Q4H PRN    iohexol (OMNIPAQUE) 240 MG/ML solution 50 mL  50 mL Oral 90 min pre-procedure    lidocaine (LIDODERM) 5 % patch 2 patch  2 patch Topical Daily    LORazepam (ATIVAN) injection 1 mg  1 mg Intravenous Q2H PRN    magnesium oxide (MAG-OX) tablet 400 mg  400 mg Oral BID    melatonin tablet 6 mg  6 mg Oral HS    methocarbamol (ROBAXIN) tablet 750 mg  750 mg Oral Q8H [address]    miconazole 2 % cream   Topical BID    mirtazapine (REMERON) tablet 30 mg  30 mg Oral HS    OLANZapine (ZyPREXA) tablet 5 mg  5 mg Oral HS    ondansetron (ZOFRAN) injection 4 mg  4 mg Intravenous Q6H PRN    oxyCODONE (ROXICODONE) immediate release tablet 10 mg  10 mg Oral Q4H PRN    pantoprazole (PROTONIX) EC tablet 40 mg  40 mg Oral Daily    polyethylene glycol (MIRALAX) packet 17 g  17 g Oral Daily    saccharomyces boulardii (FLORASTOR) capsule 250 mg  250 mg Oral BID    senna (SENOKOT) tablet 8 6 mg  1 tablet Oral BID    warfarin (COUMADIN) tablet 3 mg  3 mg Oral Once (warfarin)    and PTA meds:   None       Allergies   Allergen Reactions    Cat Hair Extract     Dog Epithelium     Latex     Pollen Extract        Objective     Temp:  [97 8 °F (36 6 °C)-98 2 °F (36 8 °C)] 97 8 °F (36 6 °C)  HR:  [68-86] 86  Resp:  [16-18] 17  BP: ()/(50-59) 121/59    Physical Exam  Vitals signs and nursing note reviewed  Constitutional:       General: She is awake  She is not in acute distress  Eyes:      Pupils: Pupils are equal, round, and reactive to light  Cardiovascular:      Rate and Rhythm: Normal rate and regular rhythm  Skin:     General: Skin is warm and dry  Neurological:      General: No focal deficit present  Mental Status: She is alert and oriented to person, place, and time  GCS: GCS eye subscore is 4  GCS verbal subscore is 5  GCS motor subscore is 6  Psychiatric:         Behavior: Behavior is cooperative  Lab Results:   Results from last 7 days   Lab Units 09/20/20  0900   WBC Thousand/uL 8 09   HEMOGLOBIN g/dL 9 0*   HEMATOCRIT % 28 6*   PLATELETS Thousands/uL 413*      Results from last 7 days   Lab Units 09/21/20  0909 09/20/20  0900   POTASSIUM mmol/L  --  4 4   CHLORIDE mmol/L  --  107   CO2 mmol/L  --  28   BUN mg/dL  --  11   CREATININE mg/dL  --  0 59*   CALCIUM mg/dL  --  9 0   ALK PHOS U/L 119*  --    ALT U/L 8*  --    AST U/L 15  --        Imaging Studies: I have personally reviewed pertinent reports  EKG, Pathology, and Other Studies: I have personally reviewed pertinent reports  Counseling / Coordination of Care  Total floor / unit time spent today 20 minutes  Greater than 50% of total time was spent with the patient and / or family counseling and / or coordination of care   A description of the counseling / coordination of care:  Patient interview, physical examination, review of medical record, review of imaging and laboratory data, development of pain management plan, discussion of pain management plan with patient and primary service  Please note that the APS provides consultative services regarding pain management only  With the exception of ketamine and epidural infusions and except when indicated, final decisions regarding starting or changing doses of analgesic medications are at the discretion of the consulting service  Off hours consultation and/or medication management is generally not available      Gisel Cabrera PA-C  Acute Pain Service

## 2020-09-22 LAB
INR PPP: 1.53 (ref 0.84–1.19)
PROTHROMBIN TIME: 18.4 SECONDS (ref 11.6–14.5)

## 2020-09-22 PROCEDURE — 85610 PROTHROMBIN TIME: CPT | Performed by: PHYSICIAN ASSISTANT

## 2020-09-22 PROCEDURE — 99232 SBSQ HOSP IP/OBS MODERATE 35: CPT | Performed by: PHYSICIAN ASSISTANT

## 2020-09-22 PROCEDURE — 99024 POSTOP FOLLOW-UP VISIT: CPT | Performed by: PHYSICIAN ASSISTANT

## 2020-09-22 RX ORDER — HYDROMORPHONE HCL/PF 1 MG/ML
1 SYRINGE (ML) INJECTION EVERY 6 HOURS PRN
Status: DISCONTINUED | OUTPATIENT
Start: 2020-09-22 | End: 2020-09-23

## 2020-09-22 RX ORDER — LORAZEPAM 2 MG/ML
3 INJECTION INTRAMUSCULAR ONCE AS NEEDED
Status: COMPLETED | OUTPATIENT
Start: 2020-09-22 | End: 2020-09-22

## 2020-09-22 RX ORDER — LORAZEPAM 2 MG/ML
3 INJECTION INTRAMUSCULAR ONCE
Status: DISCONTINUED | OUTPATIENT
Start: 2020-09-22 | End: 2020-09-28 | Stop reason: HOSPADM

## 2020-09-22 RX ORDER — LORAZEPAM 2 MG/ML
2 INJECTION INTRAMUSCULAR ONCE
Status: DISCONTINUED | OUTPATIENT
Start: 2020-09-22 | End: 2020-09-22

## 2020-09-22 RX ORDER — WARFARIN SODIUM 5 MG/1
5 TABLET ORAL
Status: COMPLETED | OUTPATIENT
Start: 2020-09-22 | End: 2020-09-22

## 2020-09-22 RX ADMIN — HYDROMORPHONE HYDROCHLORIDE 1 MG: 1 INJECTION, SOLUTION INTRAMUSCULAR; INTRAVENOUS; SUBCUTANEOUS at 04:08

## 2020-09-22 RX ADMIN — MAGNESIUM OXIDE TAB 400 MG (241.3 MG ELEMENTAL MG) 400 MG: 400 (241.3 MG) TAB at 08:41

## 2020-09-22 RX ADMIN — DULOXETINE HYDROCHLORIDE 60 MG: 60 CAPSULE, DELAYED RELEASE ORAL at 08:41

## 2020-09-22 RX ADMIN — HYDROMORPHONE HYDROCHLORIDE 1 MG: 1 INJECTION, SOLUTION INTRAMUSCULAR; INTRAVENOUS; SUBCUTANEOUS at 19:40

## 2020-09-22 RX ADMIN — LORAZEPAM 1 MG: 2 INJECTION INTRAMUSCULAR; INTRAVENOUS at 22:31

## 2020-09-22 RX ADMIN — Medication 250 MG: at 17:03

## 2020-09-22 RX ADMIN — OXYCODONE HYDROCHLORIDE 10 MG: 10 TABLET ORAL at 07:36

## 2020-09-22 RX ADMIN — LORAZEPAM 1 MG: 2 INJECTION INTRAMUSCULAR; INTRAVENOUS at 09:39

## 2020-09-22 RX ADMIN — SENNOSIDES 8.6 MG: 8.6 TABLET ORAL at 08:41

## 2020-09-22 RX ADMIN — OXYCODONE HYDROCHLORIDE 10 MG: 10 TABLET ORAL at 16:28

## 2020-09-22 RX ADMIN — FERROUS SULFATE TAB 325 MG (65 MG ELEMENTAL FE) 325 MG: 325 (65 FE) TAB at 07:36

## 2020-09-22 RX ADMIN — CEFAZOLIN SODIUM 2000 MG: 2 SOLUTION INTRAVENOUS at 08:41

## 2020-09-22 RX ADMIN — CALCIUM ACETATE 667 MG: 667 CAPSULE ORAL at 16:28

## 2020-09-22 RX ADMIN — PANTOPRAZOLE SODIUM 40 MG: 40 TABLET, DELAYED RELEASE ORAL at 08:41

## 2020-09-22 RX ADMIN — HYDROMORPHONE HYDROCHLORIDE 1 MG: 1 INJECTION, SOLUTION INTRAMUSCULAR; INTRAVENOUS; SUBCUTANEOUS at 08:41

## 2020-09-22 RX ADMIN — Medication 1000 UNITS: at 08:41

## 2020-09-22 RX ADMIN — Medication 250 MG: at 08:41

## 2020-09-22 RX ADMIN — OXYCODONE HYDROCHLORIDE 10 MG: 10 TABLET ORAL at 11:30

## 2020-09-22 RX ADMIN — SENNOSIDES 8.6 MG: 8.6 TABLET ORAL at 17:02

## 2020-09-22 RX ADMIN — LORAZEPAM 1 MG: 2 INJECTION INTRAMUSCULAR; INTRAVENOUS at 04:58

## 2020-09-22 RX ADMIN — HYDROMORPHONE HYDROCHLORIDE 1 MG: 1 INJECTION, SOLUTION INTRAMUSCULAR; INTRAVENOUS; SUBCUTANEOUS at 13:29

## 2020-09-22 RX ADMIN — ASPIRIN 81 MG CHEWABLE TABLET 81 MG: 81 TABLET CHEWABLE at 08:41

## 2020-09-22 RX ADMIN — CEFAZOLIN SODIUM 2000 MG: 2 SOLUTION INTRAVENOUS at 23:59

## 2020-09-22 RX ADMIN — CALCIUM ACETATE 667 MG: 667 CAPSULE ORAL at 07:36

## 2020-09-22 RX ADMIN — MAGNESIUM OXIDE TAB 400 MG (241.3 MG ELEMENTAL MG) 400 MG: 400 (241.3 MG) TAB at 17:02

## 2020-09-22 RX ADMIN — LORAZEPAM 3 MG: 2 INJECTION INTRAMUSCULAR; INTRAVENOUS at 22:31

## 2020-09-22 RX ADMIN — METHOCARBAMOL TABLETS 750 MG: 750 TABLET, COATED ORAL at 08:41

## 2020-09-22 RX ADMIN — METHOCARBAMOL TABLETS 750 MG: 750 TABLET, COATED ORAL at 16:28

## 2020-09-22 RX ADMIN — OXYCODONE HYDROCHLORIDE AND ACETAMINOPHEN 500 MG: 500 TABLET ORAL at 08:41

## 2020-09-22 RX ADMIN — MELATONIN 6 MG: at 19:40

## 2020-09-22 RX ADMIN — CALCIUM ACETATE 667 MG: 667 CAPSULE ORAL at 11:20

## 2020-09-22 RX ADMIN — FONDAPARINUX SODIUM 2.5 MG: 2.5 INJECTION, SOLUTION SUBCUTANEOUS at 11:20

## 2020-09-22 RX ADMIN — OXYCODONE HYDROCHLORIDE 10 MG: 10 TABLET ORAL at 03:06

## 2020-09-22 RX ADMIN — WARFARIN SODIUM 5 MG: 5 TABLET ORAL at 17:03

## 2020-09-22 RX ADMIN — OLANZAPINE 5 MG: 5 TABLET, FILM COATED ORAL at 19:40

## 2020-09-22 RX ADMIN — MIRTAZAPINE 30 MG: 30 TABLET, FILM COATED ORAL at 19:40

## 2020-09-22 RX ADMIN — CEFAZOLIN SODIUM 2000 MG: 2 SOLUTION INTRAVENOUS at 16:28

## 2020-09-22 RX ADMIN — CEFAZOLIN SODIUM 2000 MG: 2 SOLUTION INTRAVENOUS at 00:15

## 2020-09-22 NOTE — PROGRESS NOTES
Progress Note - Acute Pain Service    Yari Olsen 34 y o  female MRN: [id number]  Unit/Bed#: [facility] 421-01 Encounter: [id number]      Assessment:   Principal Problem:    Bacteremia due to Staphylococcus aureus  Active Problems:    Acute bacterial endocarditis    Septic embolism (HCC)    Bipolar 1 disorder (HCC)    Right hip pain    Intravenous drug abuse ([address] )    DVT of axillary vein, acute left (HCC)    Transaminitis    Rash    Anemia    Hyperphosphatemia    S/P TVR (tricuspid valve repair)    Yari Olsen is a 34 y o  female  with history of opioid use disorder admitted with endocarditis, status post TVR  Plan:   Acetaminophen 975 mg p o  q 8 hours p r n  mild pain  Oxycodone 10 mg p o  q 4 hours p r n  severe pain  Decrease hydromorphone to 1 mg IV Q 6 hours p r n  breakthrough pain  Continue duloxetine 60 mg p o  daily scheduled  Continue lorazepam 1 mg IV q 2 hours p r n  anxiety, plan to decreased to 0 5 mg IV q 2 hours p r n  anxiety tomorrow  Continue Zyprexa 5 mg p o  hs scheduled  Continue methocarbamol 750 mg p o  q 8 hours scheduled  Continue lidocaine patch x2 for 12 hours daily  Suggest Ativan 3 mg IV x1 on call to MRI, can give another 3 mg IV prior to or during MRI as needed for anxiety  Continue bowel regimen to avoid opioid induced constipation  APS will continue to follow  Please call  / [phone] or [facility] Acute Pain Service - [facility] (/ between [phone] and on weekends) with questions or concerns    Pain History  Current pain location(s):  Right hip  Pain Scale:   6/10  Quality:  Aching  24 hour history:  Patient states her right hip pain has worsened over the past 1-2 days  Denies any trauma or events to precipitate this  Is tolerating opioid and benzodiazepine wean well  Patient has been walking in the halls and has been more interactive  MRI plan today to evaluate right hip pain      Opioid requirement previous 24 hours:  Oxycodone 45 mg p o , hydromorphone 4 mg IV       Meds/Allergies   all current active meds have been reviewed, current meds:   Current Facility-Administered Medications   Medication Dose Route Frequency    acetaminophen (TYLENOL) tablet 975 mg  975 mg Oral Q8H PRN    ascorbic acid (VITAMIN C) tablet 500 mg  500 mg Oral Daily    aspirin chewable tablet 81 mg  81 mg Oral Daily    bisacodyl (DULCOLAX) rectal suppository 10 mg  10 mg Rectal Daily PRN    calcium acetate (PHOSLO) capsule 667 mg  667 mg Oral TID With Meals    calcium carbonate (TUMS) chewable tablet 1,000 mg  1,000 mg Oral Daily PRN    ceFAZolin (ANCEF) IVPB (premix) 2,000 mg 50 mL  2,000 mg Intravenous Q8H    cholecalciferol (VITAMIN D3) tablet 1,000 Units  1,000 Units Oral Daily    dicyclomine (BENTYL) capsule 10 mg  10 mg Oral TID PRN    docusate sodium (COLACE) capsule 100 mg  100 mg Oral BID    DULoxetine (CYMBALTA) delayed release capsule 60 mg  60 mg Oral Daily    ergocalciferol (VITAMIN D2) capsule 50,000 Units  50,000 Units Oral Once per day on Mon Wed Fri    ferrous sulfate tablet 325 mg  325 mg Oral Daily With Breakfast    fondaparinux (ARIXTRA) subcutaneous injection 2 5 mg  2 5 mg Subcutaneous Q24H    glycopyrrolate (ROBINUL) injection 0 2 mg  0 2 mg Intravenous Q4H PRN    HYDROmorphone (DILAUDID) injection 1 mg  1 mg Intravenous Q6H PRN    iohexol (OMNIPAQUE) 240 MG/ML solution 50 mL  50 mL Oral 90 min pre-procedure    lidocaine (LIDODERM) 5 % patch 2 patch  2 patch Topical Daily    LORazepam (ATIVAN) injection 1 mg  1 mg Intravenous Q2H PRN    LORazepam (ATIVAN) injection 2 mg  2 mg Intravenous Once    LORazepam (ATIVAN) injection 3 mg  3 mg Intravenous Once    magnesium oxide (MAG-OX) tablet 400 mg  400 mg Oral BID    melatonin tablet 6 mg  6 mg Oral HS    methocarbamol (ROBAXIN) tablet 750 mg  750 mg Oral Q8H [facility]    miconazole 2 % cream   Topical BID    mirtazapine (REMERON) tablet 30 mg  30 mg Oral HS    OLANZapine (ZyPREXA) tablet 5 mg  5 mg Oral HS    ondansetron (ZOFRAN) injection 4 mg  4 mg Intravenous Q6H PRN    oxyCODONE (ROXICODONE) immediate release tablet 10 mg  10 mg Oral Q4H PRN    pantoprazole (PROTONIX) EC tablet 40 mg  40 mg Oral Daily    polyethylene glycol (MIRALAX) packet 17 g  17 g Oral Daily    saccharomyces boulardii (FLORASTOR) capsule 250 mg  250 mg Oral BID    senna (SENOKOT) tablet 8 6 mg  1 tablet Oral BID    warfarin (COUMADIN) tablet 5 mg  5 mg Oral Once (warfarin)    and PTA meds:   None       Allergies   Allergen Reactions    Cat Hair Extract     Dog Epithelium     Latex     Pollen Extract        Objective     Temp:  [97 7 °F (36 5 °C)-98 7 °F (37 1 °C)] 97 9 °F (36 6 °C)  HR:  [69-91] 91  Resp:  [16-18] 18  BP: ()/(54-95) 141/95    Physical Exam  Vitals signs and nursing note reviewed  Constitutional:       General: She is awake  She is not in acute distress  Eyes:      Pupils: Pupils are equal, round, and reactive to light  Skin:     General: Skin is warm and dry  Neurological:      General: No focal deficit present  Mental Status: She is alert and oriented to person, place, and time  GCS: GCS eye subscore is 4  GCS verbal subscore is 5  GCS motor subscore is 6  Psychiatric:         Behavior: Behavior is cooperative  Lab Results:   Results from last 7 days   Lab Units 09/20/20  0900   WBC Thousand/uL 8 09   HEMOGLOBIN g/dL 9 0*   HEMATOCRIT % 28 6*   PLATELETS Thousands/uL 413*      Results from last 7 days   Lab Units 09/21/20  0909 09/20/20  0900   POTASSIUM mmol/L  --  4 4   CHLORIDE mmol/L  --  107   CO2 mmol/L  --  28   BUN mg/dL  --  11   CREATININE mg/dL  --  0 59*   CALCIUM mg/dL  --  9 0   ALK PHOS U/L 119*  --    ALT U/L 8*  --    AST U/L 15  --        Imaging Studies: I have personally reviewed pertinent reports  EKG, Pathology, and Other Studies: I have personally reviewed pertinent reports        Counseling / Coordination of Care  Total floor / unit time spent today 20 minutes  Greater than 50% of total time was spent with the patient and / or family counseling and / or coordination of care  A description of the counseling / coordination of care:  Patient interview, physical examination, review of medical record, review of imaging and laboratory data, development of pain management plan, discussion of pain management plan with patient and primary service  Please note that the APS provides consultative services regarding pain management only  With the exception of ketamine and epidural infusions and except when indicated, final decisions regarding starting or changing doses of analgesic medications are at the discretion of the consulting service  Off hours consultation and/or medication management is generally not available      Eloina Greenberg PA-C  Acute Pain Service

## 2020-09-22 NOTE — UTILIZATION REVIEW
Continued Stay Review    Date: 09/22/2020                          Current Patient Class: Inpatient  Current Level of Care: Med/Surg    HPI:29 y o  female initially admitted on 08/12/2020   Tricuspid Valve Endocarditis  S/P tricuspid valve repair; POD # 12  Assessment/Plan:  Bacteremia huma to Staphylococcus Aureus  Continue IV Abx thru 9/28, patient left on 2 occasions 2 times  Active IV heroin abuse      VTE Pharmacologic Prophylaxis: Fondaparinux (Arixtra)  VTE Mechanical Prophylaxis: sequential compression device  Pertinent Labs/Diagnostic Results:     Results from last 7 days   Lab Units 09/20/20  0900 09/19/20  0511 09/17/20  0520 09/16/20  1509 09/16/20  0432   WBC Thousand/uL 8 09  --   --   --  5 92   HEMOGLOBIN g/dL 9 0* 8 0* 7 8* 8 1* 6 9*   HEMATOCRIT % 28 6* 25 1* 24 8* 26 0* 23 0*   PLATELETS Thousands/uL 413*  --   --   --  253   NEUTROS ABS Thousands/µL  --   --   --   --  2 73     Results from last 7 days   Lab Units 09/20/20  0900 09/19/20  0511 09/17/20  0520   SODIUM mmol/L 139 142 141   POTASSIUM mmol/L 4 4 4 0 4 2   CHLORIDE mmol/L 107 107 106   CO2 mmol/L 28 28 28   ANION GAP mmol/L 4 7 7   BUN mg/dL 11 11 9   CREATININE mg/dL 0 59* 0 49* 0 43*   EGFR ml/min/1 73sq m 126 134 140   CALCIUM mg/dL 9 0 8 8 8 6   PHOSPHORUS mg/dL  --  5 3*  --      Results from last 7 days   Lab Units 09/21/20  0909   AST U/L 15   ALT U/L 8*   ALK PHOS U/L 119*   TOTAL PROTEIN g/dL 6 9   ALBUMIN g/dL 3 0*   TOTAL BILIRUBIN mg/dL 0 22   BILIRUBIN DIRECT mg/dL 0 10     Results from last 7 days   Lab Units 09/18/20  0516 09/17/20  2135 09/17/20  1728 09/17/20  1038 09/17/20  0605 09/16/20  2044 09/16/20  1451 09/16/20  1103 09/16/20  0547 09/15/20  2057   POC GLUCOSE mg/dl 93 106 105 101 133 130 98 103 91 118     Results from last 7 days   Lab Units 09/20/20  0900 09/19/20  0511 09/17/20  0520   GLUCOSE RANDOM mg/dL 94 88 90     Results from last 7 days   Lab Units 09/22/20  0959 09/21/20  0909 09/20/20  0900 PROTIME seconds 18 4* 19 9* 19 8*   INR  1 53* 1 70* 1 68*     Results from last 7 days   Lab Units 09/21/20  0912 09/21/20  0909   CRP mg/L  --  15 4*   SED RATE mm/hour 11  --      Vital Signs: BP 98/54 (BP Location: Right arm)   Pulse 77   Temp 98 2 °F (36 8 °C) (Oral)   Resp 18   Ht 5' 5" (1 651 m)   Wt 67 9 kg (149 lb 11 1 oz)   SpO2 99%   Breastfeeding No   BMI 24 91 kg/m²       Medications:   Scheduled Medications:  ascorbic acid, 500 mg, Oral, Daily  aspirin, 81 mg, Oral, Daily  calcium acetate, 667 mg, Oral, TID With Meals  cefazolin, 2,000 mg, Intravenous, Q8H  cholecalciferol, 1,000 Units, Oral, Daily  docusate sodium, 100 mg, Oral, BID  DULoxetine, 60 mg, Oral, Daily  ergocalciferol, 50,000 Units, Oral, Once per day on Mon Wed Fri  ferrous sulfate, 325 mg, Oral, Daily With Breakfast  fondaparinux, 2 5 mg, Subcutaneous, Q24H  iohexol, 50 mL, Oral, 90 min pre-procedure  lidocaine, 2 patch, Topical, Daily  LORazepam, 3 mg, Intravenous, Once  magnesium oxide, 400 mg, Oral, BID  melatonin, 6 mg, Oral, HS  methocarbamol, 750 mg, Oral, Q8H JEFF  miconazole, , Topical, BID  mirtazapine, 30 mg, Oral, HS  OLANZapine, 5 mg, Oral, HS  pantoprazole, 40 mg, Oral, Daily  polyethylene glycol, 17 g, Oral, Daily  saccharomyces boulardii, 250 mg, Oral, BID  senna, 1 tablet, Oral, BID  warfarin, 5 mg, Oral, Once (warfarin)      Continuous IV Infusions:     PRN Meds:  acetaminophen, 975 mg, Oral, Q8H PRN  bisacodyl, 10 mg, Rectal, Daily PRN  calcium carbonate, 1,000 mg, Oral, Daily PRN  dicyclomine, 10 mg, Oral, TID PRN  glycopyrrolate, 0 2 mg, Intravenous, Q4H PRN  HYDROmorphone, 1 mg, Intravenous, Q6H PRN  LORazepam, 1 mg, Intravenous, Q2H PRN  LORazepam, 3 mg, Intravenous, Once PRN  ondansetron, 4 mg, Intravenous, Q6H PRN  oxyCODONE, 10 mg, Oral, Q4H PRN        Discharge Plan: TBD    Network Utilization Review Department  [email or web address]  ATTENTION: Please call with any questions or concerns to [phone] and carefully listen to the prompts so that you are directed to the right person  All voicemails are confidential   Valeta Pastel all requests for admission clinical reviews, approved or denied determinations and any other requests to dedicated fax number below belonging to the campus where the patient is receiving treatment   List of dedicated fax numbers for the Facilities:  [address] DENIALS (Administrative/Medical Necessity) [phone]   [address] (Maternity/NICU/Pediatrics) [phone]   Cj Fischer [phone]   Ga Feliz [phone]   Betty Sheehan [phone]   Lise Gore [phone]   [address] [place] [address] [phone]   [facility]  [phone]   [address] [facility] [address] [facility]  [address] [facility] [address] [facility] [phone]

## 2020-09-23 LAB
GLUCOSE SERPL-MCNC: 115 MG/DL (ref 65–140)
GLUCOSE SERPL-MCNC: 140 MG/DL (ref 65–140)
INR PPP: 1.4 (ref 0.84–1.19)
INR PPP: 1.53 (ref 0.84–1.19)
PROTHROMBIN TIME: 17.1 SECONDS (ref 11.6–14.5)
PROTHROMBIN TIME: 18.4 SECONDS (ref 11.6–14.5)

## 2020-09-23 PROCEDURE — 82948 REAGENT STRIP/BLOOD GLUCOSE: CPT

## 2020-09-23 PROCEDURE — 99232 SBSQ HOSP IP/OBS MODERATE 35: CPT | Performed by: PHYSICIAN ASSISTANT

## 2020-09-23 PROCEDURE — 99233 SBSQ HOSP IP/OBS HIGH 50: CPT | Performed by: INTERNAL MEDICINE

## 2020-09-23 PROCEDURE — 99024 POSTOP FOLLOW-UP VISIT: CPT | Performed by: PHYSICIAN ASSISTANT

## 2020-09-23 PROCEDURE — 85610 PROTHROMBIN TIME: CPT | Performed by: PHYSICIAN ASSISTANT

## 2020-09-23 RX ORDER — LORAZEPAM 2 MG/ML
3 INJECTION INTRAMUSCULAR ONCE
Status: COMPLETED | OUTPATIENT
Start: 2020-09-23 | End: 2020-09-24

## 2020-09-23 RX ORDER — LORAZEPAM 2 MG/ML
0.5 INJECTION INTRAMUSCULAR EVERY 4 HOURS PRN
Status: DISCONTINUED | OUTPATIENT
Start: 2020-09-23 | End: 2020-09-26

## 2020-09-23 RX ORDER — HYDROMORPHONE HCL/PF 1 MG/ML
0.5 SYRINGE (ML) INJECTION EVERY 4 HOURS PRN
Status: DISCONTINUED | OUTPATIENT
Start: 2020-09-23 | End: 2020-09-23

## 2020-09-23 RX ORDER — WARFARIN SODIUM 5 MG/1
5 TABLET ORAL
Status: COMPLETED | OUTPATIENT
Start: 2020-09-23 | End: 2020-09-23

## 2020-09-23 RX ORDER — HYDROMORPHONE HCL/PF 1 MG/ML
1 SYRINGE (ML) INJECTION EVERY 6 HOURS PRN
Status: DISCONTINUED | OUTPATIENT
Start: 2020-09-23 | End: 2020-09-26

## 2020-09-23 RX ADMIN — FERROUS SULFATE TAB 325 MG (65 MG ELEMENTAL FE) 325 MG: 325 (65 FE) TAB at 10:33

## 2020-09-23 RX ADMIN — DULOXETINE HYDROCHLORIDE 60 MG: 60 CAPSULE, DELAYED RELEASE ORAL at 10:34

## 2020-09-23 RX ADMIN — SENNOSIDES 8.6 MG: 8.6 TABLET ORAL at 17:58

## 2020-09-23 RX ADMIN — FONDAPARINUX SODIUM 2.5 MG: 2.5 INJECTION, SOLUTION SUBCUTANEOUS at 10:58

## 2020-09-23 RX ADMIN — OXYCODONE HYDROCHLORIDE 10 MG: 10 TABLET ORAL at 10:30

## 2020-09-23 RX ADMIN — HYDROMORPHONE HYDROCHLORIDE 1 MG: 1 INJECTION, SOLUTION INTRAMUSCULAR; INTRAVENOUS; SUBCUTANEOUS at 17:53

## 2020-09-23 RX ADMIN — HYDROMORPHONE HYDROCHLORIDE 1 MG: 1 INJECTION, SOLUTION INTRAMUSCULAR; INTRAVENOUS; SUBCUTANEOUS at 11:54

## 2020-09-23 RX ADMIN — CEFAZOLIN SODIUM 2000 MG: 2 SOLUTION INTRAVENOUS at 10:35

## 2020-09-23 RX ADMIN — OXYCODONE HYDROCHLORIDE 10 MG: 10 TABLET ORAL at 21:15

## 2020-09-23 RX ADMIN — CALCIUM ACETATE 667 MG: 667 CAPSULE ORAL at 10:32

## 2020-09-23 RX ADMIN — SENNOSIDES 8.6 MG: 8.6 TABLET ORAL at 10:34

## 2020-09-23 RX ADMIN — PANTOPRAZOLE SODIUM 40 MG: 40 TABLET, DELAYED RELEASE ORAL at 10:33

## 2020-09-23 RX ADMIN — Medication 1000 UNITS: at 10:34

## 2020-09-23 RX ADMIN — OXYCODONE HYDROCHLORIDE 10 MG: 10 TABLET ORAL at 15:26

## 2020-09-23 RX ADMIN — ONDANSETRON 4 MG: 2 INJECTION INTRAMUSCULAR; INTRAVENOUS at 10:56

## 2020-09-23 RX ADMIN — CEFAZOLIN SODIUM 2000 MG: 2 SOLUTION INTRAVENOUS at 17:56

## 2020-09-23 RX ADMIN — OXYCODONE HYDROCHLORIDE AND ACETAMINOPHEN 500 MG: 500 TABLET ORAL at 10:35

## 2020-09-23 RX ADMIN — MAGNESIUM OXIDE TAB 400 MG (241.3 MG ELEMENTAL MG) 400 MG: 400 (241.3 MG) TAB at 10:33

## 2020-09-23 RX ADMIN — MAGNESIUM OXIDE TAB 400 MG (241.3 MG ELEMENTAL MG) 400 MG: 400 (241.3 MG) TAB at 17:57

## 2020-09-23 RX ADMIN — ASPIRIN 81 MG CHEWABLE TABLET 81 MG: 81 TABLET CHEWABLE at 10:33

## 2020-09-23 RX ADMIN — Medication 250 MG: at 10:33

## 2020-09-23 RX ADMIN — WARFARIN SODIUM 5 MG: 5 TABLET ORAL at 17:58

## 2020-09-23 RX ADMIN — OLANZAPINE 5 MG: 5 TABLET, FILM COATED ORAL at 21:12

## 2020-09-23 RX ADMIN — METHOCARBAMOL TABLETS 750 MG: 750 TABLET, COATED ORAL at 10:32

## 2020-09-23 RX ADMIN — CALCIUM ACETATE 667 MG: 667 CAPSULE ORAL at 17:57

## 2020-09-23 RX ADMIN — MIRTAZAPINE 30 MG: 30 TABLET, FILM COATED ORAL at 21:11

## 2020-09-23 RX ADMIN — MELATONIN 6 MG: at 21:11

## 2020-09-23 RX ADMIN — METHOCARBAMOL TABLETS 750 MG: 750 TABLET, COATED ORAL at 17:58

## 2020-09-23 RX ADMIN — Medication 250 MG: at 17:58

## 2020-09-23 RX ADMIN — ERGOCALCIFEROL 50000 UNITS: 1.25 CAPSULE ORAL at 10:38

## 2020-09-23 RX ADMIN — POLYETHYLENE GLYCOL 3350 17 G: 17 POWDER, FOR SOLUTION ORAL at 10:31

## 2020-09-23 NOTE — PROGRESS NOTES
At approximately 2230 pt ordered to go to MRI  D/t conflicting/multiple ativan orders, Diamante Mosley was involved and ordered 3mg ativan prior to MRI and 1 ativan during MRI  However- when it came time to take pt down to MRI she refused to go due to "not enough ativan, the current ativan did nothing  Im not going " After educating the patient on the importance of the MRI- pt still refused to go  Team was notified of the pt's decision  Will continue to monitor

## 2020-09-23 NOTE — PROGRESS NOTES
Progress Note - Cardiothoracic Surgery   Brit Gaitan 34 y o  female MRN: [id number]  Unit/Bed#: [facility] 421-01 Encounter: [id number]  Tricuspid Valve Endocarditis  S/P tricuspid valve repair; POD # 13    24 Hour Events: MRI not performed b/c issues w/ Ativan orders  No other events      Medications:   Scheduled Meds:  Current Facility-Administered Medications   Medication Dose Route Frequency Provider Last Rate    acetaminophen  975 mg Oral Q8H PRN Kyra Mejia PA-C      ascorbic acid  500 mg Oral Daily Cesario Cantor PA-C      aspirin  81 mg Oral Daily Melvi Salazar PA-C      bisacodyl  10 mg Rectal Daily PRN Melvi Salazar PA-C      calcium acetate  667 mg Oral TID With Meals Melvi Salazar PA-C      calcium carbonate  1,000 mg Oral Daily PRN Melvi Salazar PA-C      cefazolin  2,000 mg Intravenous Q8H Wiliammason Álvarez MD 2,000 mg (09/22/20 9370)    cholecalciferol  1,000 Units Oral Daily Vinton Romberg, MD      dicyclomine  10 mg Oral TID PRN Melvi Salazar PA-C      docusate sodium  100 mg Oral BID Melvi Salazar PA-C      DULoxetine  60 mg Oral Daily Melvi Salazar PA-C      ergocalciferol  50,000 Units Oral Once per day on Mon Wed Fri Lindsay Marcano PA-C      ferrous sulfate  325 mg Oral Daily With Breakfast Amara Browning PA-C      fondaparinux  2 5 mg Subcutaneous Q24H Amara Browning PA-C      glycopyrrolate  0 2 mg Intravenous Q4H PRN Melvi Salazar PA-C      HYDROmorphone  1 mg Intravenous Q6H PRN Truddie Fabry, PA-C      iohexol  50 mL Oral 90 min pre-procedure Melvi Salazar PA-C      lidocaine  2 patch Topical Daily Melvi Salazar PA-C      LORazepam  1 mg Intravenous Q2H PRN Kirsty Rivers PA-C      LORazepam  3 mg Intravenous Once Cesario Cantor PA-C      magnesium oxide  400 mg Oral BID Reynaldo Browning PA-C      melatonin  6 mg Oral HS Cesario Cantor PA-C      methocarbamol  750 mg Oral [facility] Kyra Mejia PA-C      miconazole   Topical BID Ileene Cancel, MILLIE      mirtazapine  30 mg Oral HS Amara PARR aNm, MILLIE      OLANZapine  5 mg Oral HS Amara KESHAV Nam, MILLIE      ondansetron  4 mg Intravenous Q6H PRN Ileene Cancel, MILLIE      oxyCODONE  10 mg Oral Q4H PRN Herminia Lo, MILLIE      pantoprazole  40 mg Oral Daily Ileene Cancel, MILLIE      polyethylene glycol  17 g Oral Daily Ileene Cancel, MILLIE      saccharomyces boulardii  250 mg Oral BID Ileene Cancel, MILLIE      senna  1 tablet Oral BID Ileene Cancel, MILLIE       Continuous Infusions:   PRN Meds:   acetaminophen    bisacodyl    calcium carbonate    dicyclomine    glycopyrrolate    HYDROmorphone    LORazepam    ondansetron    oxyCODONE    Vitals:   Vitals:    09/22/20 1115 09/22/20 1503 09/22/20 2000 09/22/20 2300   BP: 141/95 98/54 133/65 107/58   BP Location: Left arm Right arm Right arm Left arm   Pulse: 91 77 (!) 148 94   Resp: 18 18 18 18   Temp: 97 9 °F (36 6 °C) 98 2 °F (36 8 °C) 98 4 °F (36 9 °C) 97 6 °F (36 4 °C)   TempSrc: Oral Oral Oral Oral   SpO2:   95% 97%   Weight:       Height:         Bp x24hrs: /50-90    Telemetry: NSR; Heart Rate: 63    Respiratory:   SpO2: SpO2: 97 %, SpO2 Activity: SpO2 Activity: At Rest, SpO2 Device: O2 Device: None (Room air); Room Air    Intake/Output:   I/O       09/21 0701 - 09/22 0700 09/22 0701 - 09/23 0700 09/23 0701 - 09/24 0700    P  O   558     IV Piggyback 50 50     Total Intake(mL/kg) 50 (0 7) 608 (9)     Urine (mL/kg/hr)       Stool       Total Output       Net +50 +608                UOP - no recorded/24hrs    Chest tube Output:  CTs & EPWs have been discontinued    Weights:   Weight (last 2 days)     Date/Time   Weight    09/22/20 0600   67 9 (149 69)            Admit weight: 67 9kg - no new weight today    Results:   NO NEW CBC OR BMP  Results from last 7 days   Lab Units 09/20/20  0900 09/19/20  0511 09/17/20  0520   WBC Thousand/uL 8 09  --   --    HEMOGLOBIN g/dL 9 0* 8 0* 7 8* HEMATOCRIT % 28 6* 25 1* 24 8*   PLATELETS Thousands/uL 413*  --   --      Results from last 7 days   Lab Units 09/20/20  0900 09/19/20  0511 09/17/20  0520   SODIUM mmol/L 139 142 141   POTASSIUM mmol/L 4 4 4 0 4 2   CHLORIDE mmol/L 107 107 106   CO2 mmol/L 28 28 28   BUN mg/dL 11 11 9   CREATININE mg/dL 0 59* 0 49* 0 43*   CALCIUM mg/dL 9 0 8 8 8 6     Results from last 7 days   Lab Units 09/23/20  0546 09/22/20  0959 09/21/20  0909   INR  1 53* 1 53* 1 70*     Coumadin mg 9/22 - 5 9/21 - 3  9/20 - 3  9/19 - 3  9/18 - 3  9/17 - 2  9/16 - 3  9/15 - 1  9/14 - 5  9/13 - 5          Point of care glucose: none taken    Studies:  No new studies    I have personally reviewed pertinent reports  and I have personally reviewed pertinent films in PACS    Invasive Lines/Tubes:  Invasive Devices     Peripherally Inserted Central Catheter Line            PICC Line 08/26/20 27 days                Physical Exam:    HEENT/NECK:  PERRLA  No jugular venous distention  Cardiac: Regular rate and rhythm  Pulmonary:  Breath sounds clear bilaterally  Abdomen:  Normal bowel sounds  Incisions: Sternum is stable  Incision is clean, dry, and intact  Extremities: Extremities warm/dry and No edema B/L  Neuro: Alert and oriented X 3  Skin: Warm/Dry, without rashes or lesions  Assessment:  Principal Problem:    Bacteremia due to Staphylococcus aureus  Active Problems:    Acute bacterial endocarditis    Septic embolism (HCC)    Bipolar 1 disorder (HCC)    Right hip pain    Intravenous drug abuse ([address] )    DVT of axillary vein, acute left (HCC)    Transaminitis    Rash    Anemia    Hyperphosphatemia    S/P TVR (tricuspid valve repair)     Tricuspid Valve Endocarditis  S/P tricuspid valve repair; POD # 13    Plan:    1   Cardiac:   NSR; HR/BP well-controlled  No beta blocker for prior hypotension  Continue ASA therapy  Statin not indicated  INR pending, dose Coumadin tonight  Epicardial pacing wires out  PICC for abx  Continue DVT prophylaxis    2  Pulmonary:   Good Room air oxygen saturation; Continue incentive spirometry/Coughing/Deep breathing exercises  Chest tubes have been discontinued    3  Renal:   Intake/Output net: (+)608 mL/24 hours -- likely more (-) given innacurate I/Os  Autodiuresing well    4  Neuro:  Neurologically intact; No active issues  Incisional pain well-controlled  Continue Tylenol, 975 mg PO q 8, standing dose  Continue Oxycodone, 2 5 to 5 mg PO q 4 hours prn pain  Continue Tylenol, 975 mg PO q 8, standing dose  Continue Oxycodone, 2 5 to 5 mg PO q 4 hours prn pain  PRN Dilaudid per APS  Continue Robaxin per APS                    Continue Lidoderm        Continue Remeron        Continue Zyprexa    5  GI:  Tolerating TLC 2 3 gm sodium diet  Maintain 1800 mL daily fluid restriction   Continue stool softeners and prn suppository  Continue GI prophylaxis    6  Endo:   No history of diabetes; Glucose well-controlled with sliding scale coverage    7    Hematology:      Continue iron/vitmain C supplementation              Hyperphosphatemia, endo following              Vitamin D deficiency, continue supplementation              Endocarditis                          Afebrile                          ID following                          Ancef thorough 9 /27 -- need to determine LD as for discharge 9/27 vs 9/28                          MRI pending for sacroilitis    8  Disposition:      Ambulating independently, Anticipate discharge to home once abx complete     VTE Pharmacologic Prophylaxis: Fondaparinux (Arixtra)  VTE Mechanical Prophylaxis: sequential compression device    Collaborative rounds completed with SARAH Cooper    Plan of care discussed with bedside nurse    SIGNATURE: Ritu Veloz PA-C  DATE: September 23, 2020  TIME: 7:36 AM

## 2020-09-23 NOTE — PROGRESS NOTES
Progress Note - Acute Pain Service    Josefarheen Friday 34 y o  female MRN: [id number]  Unit/Bed#: [facility] 421-01 Encounter: [id number]      Assessment:   Principal Problem:    Bacteremia due to Staphylococcus aureus  Active Problems:    Acute bacterial endocarditis    Septic embolism (HCC)    Bipolar 1 disorder (HCC)    Right hip pain    Intravenous drug abuse ([address] )    DVT of axillary vein, acute left (HCC)    Transaminitis    Rash    Anemia    Hyperphosphatemia    S/P TVR (tricuspid valve repair)    Anabell Friday is a 34 y o  female  admitted with endocarditis from IV heroin use, status post TVR  Plan:   Continue oxycodone 10 mg p o  q 4 hours p r n  severe pain  Continue hydromorphone 1 mg IV q 6 hours p r n  breakthrough pain  Continue acetaminophen 975 mg p o  q 8 hours p r n  mild pain  Continue duloxetine 60 mg p o  daily  Decrease lorazepam 0 5 mg IV q 2 hours p r n  anxiety  Continue methocarbamol 750 mg p o  q 8 hours scheduled  Continue lidocaine patch, 2 patches for 12 hours daily  Patient received 3 mg IV Ativan as planned prior to going to MRI last night  Nursing was not comfortable giving an additional 3 mg IV Ativan at the time of the MRI as was planned and patient refused MRI  I feel that the patient will easily tolerate a total of 6 mg IV Ativan based on her previous dosing schedule and her tolerance of benzodiazepines currently  I would continue with the plan to give 3 mg IV Ativan on called MRI and 3 mg at the time of MRI     - Docusate (Colace) 100 mg PO twice daily  - Senna 1 tablet PO qhs  - Polyethylene glycol (Miralax) 17g PO once daily PRN    APS will continue to follow  Please call  / 1204 or [facility] (/ between [phone] and on weekends) with questions or concerns    Pain History  Current pain location(s):  Right hip  Pain Scale:   6/10  Quality:  Sore  24 hour history:  See above for issues surrounding MRI    States hip pain is relatively unchanged over past several days  His comfortable with decreasing p r n  Ativan today  Plan to continue benzodiazepine and opioid wean and discharge home without prescription for opioid or benzodiazepine      Opioid requirement previous 24 hours:  Oxycodone 40 mg p o , hydromorphone 3 mg IV    Meds/Allergies   all current active meds have been reviewed, current meds:   Current Facility-Administered Medications   Medication Dose Route Frequency    acetaminophen (TYLENOL) tablet 975 mg  975 mg Oral Q8H PRN    ascorbic acid (VITAMIN C) tablet 500 mg  500 mg Oral Daily    aspirin chewable tablet 81 mg  81 mg Oral Daily    bisacodyl (DULCOLAX) rectal suppository 10 mg  10 mg Rectal Daily PRN    calcium acetate (PHOSLO) capsule 667 mg  667 mg Oral TID With Meals    calcium carbonate (TUMS) chewable tablet 1,000 mg  1,000 mg Oral Daily PRN    ceFAZolin (ANCEF) IVPB (premix) 2,000 mg 50 mL  2,000 mg Intravenous Q8H    cholecalciferol (VITAMIN D3) tablet 1,000 Units  1,000 Units Oral Daily    dicyclomine (BENTYL) capsule 10 mg  10 mg Oral TID PRN    docusate sodium (COLACE) capsule 100 mg  100 mg Oral BID    DULoxetine (CYMBALTA) delayed release capsule 60 mg  60 mg Oral Daily    ergocalciferol (VITAMIN D2) capsule 50,000 Units  50,000 Units Oral Once per day on Mon Wed Fri    ferrous sulfate tablet 325 mg  325 mg Oral Daily With Breakfast    fondaparinux (ARIXTRA) subcutaneous injection 2 5 mg  2 5 mg Subcutaneous Q24H    glycopyrrolate (ROBINUL) injection 0 2 mg  0 2 mg Intravenous Q4H PRN    HYDROmorphone (DILAUDID) injection 0 5 mg  0 5 mg Intravenous Q4H PRN    iohexol (OMNIPAQUE) 240 MG/ML solution 50 mL  50 mL Oral 90 min pre-procedure    lidocaine (LIDODERM) 5 % patch 2 patch  2 patch Topical Daily    LORazepam (ATIVAN) injection 1 mg  1 mg Intravenous Q2H PRN    LORazepam (ATIVAN) injection 3 mg  3 mg Intravenous Once    LORazepam (ATIVAN) injection 3 mg  3 mg Intravenous Once    LORazepam (ATIVAN) injection 3 mg  3 mg Intravenous Once    magnesium oxide (MAG-OX) tablet 400 mg  400 mg Oral BID    melatonin tablet 6 mg  6 mg Oral HS    methocarbamol (ROBAXIN) tablet 750 mg  750 mg Oral Q8H [facility]    miconazole 2 % cream   Topical BID    mirtazapine (REMERON) tablet 30 mg  30 mg Oral HS    OLANZapine (ZyPREXA) tablet 5 mg  5 mg Oral HS    ondansetron (ZOFRAN) injection 4 mg  4 mg Intravenous Q6H PRN    oxyCODONE (ROXICODONE) immediate release tablet 10 mg  10 mg Oral Q4H PRN    pantoprazole (PROTONIX) EC tablet 40 mg  40 mg Oral Daily    polyethylene glycol (MIRALAX) packet 17 g  17 g Oral Daily    saccharomyces boulardii (FLORASTOR) capsule 250 mg  250 mg Oral BID    senna (SENOKOT) tablet 8 6 mg  1 tablet Oral BID    and PTA meds:   None       Allergies   Allergen Reactions    Cat Hair Extract     Dog Epithelium     Latex     Pollen Extract        Objective     Temp:  [97 6 °F (36 4 °C)-98 4 °F (36 9 °C)] 97 6 °F (36 4 °C)  HR:  [] 94  Resp:  [18] 18  BP: ()/(54-95) 107/58    Physical Exam  Vitals signs and nursing note reviewed  Constitutional:       General: She is awake  She is not in acute distress  Eyes:      Pupils: Pupils are equal, round, and reactive to light  Skin:     General: Skin is warm and dry  Neurological:      General: No focal deficit present  Mental Status: She is alert and oriented to person, place, and time  GCS: GCS eye subscore is 4  GCS verbal subscore is 5  GCS motor subscore is 6  Psychiatric:         Behavior: Behavior is cooperative           Lab Results:   Results from last 7 days   Lab Units 09/20/20  0900   WBC Thousand/uL 8 09   HEMOGLOBIN g/dL 9 0*   HEMATOCRIT % 28 6*   PLATELETS Thousands/uL 413*      Results from last 7 days   Lab Units 09/21/20  0909 09/20/20  0900   POTASSIUM mmol/L  --  4 4   CHLORIDE mmol/L  --  107   CO2 mmol/L  --  28   BUN mg/dL  --  11   CREATININE mg/dL  --  0 59*   CALCIUM mg/dL  --  9 0   ALK PHOS U/L 119*  --    ALT U/L 8*  --    AST U/L 15  --        Imaging Studies: I have personally reviewed pertinent reports  EKG, Pathology, and Other Studies: I have personally reviewed pertinent reports  Counseling / Coordination of Care  Total floor / unit time spent today 20 minutes  Greater than 50% of total time was spent with the patient and / or family counseling and / or coordination of care  A description of the counseling / coordination of care:  Patient interview, physical examination, review of medical record, review of imaging and laboratory data, development of pain management plan, discussion of pain management plan with patient and primary service  Please note that the APS provides consultative services regarding pain management only  With the exception of ketamine and epidural infusions and except when indicated, final decisions regarding starting or changing doses of analgesic medications are at the discretion of the consulting service  Off hours consultation and/or medication management is generally not available      Nicole Chan PA-C  Acute Pain Service

## 2020-09-23 NOTE — PROGRESS NOTES
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:  Prolonged course of IV antibiotic was previously recommended, but patient has left AMA on 2 prior occasions   She remains hemodynamically stable   Repeat blood culture negative on 08/15/2020; patient is postop day 12 for tricuspid valve surgery; operative culture  negative  -continue cefazolin 2 g IV q 8 hours  through 9/27/2020 to complete 6 weeks from the 1st negative blood culture  -check CBC with diff and CMP weekly while on the IV antibiotics     2  Tricuspid valve endocarditis, 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:  MELISSA done 07/21/2020 showed large vegetation on TV with severe regurgitation  7/15 CT abdomen/pelvis also showed bilateral renal parenchymal abnormalities concerning for septic emboli, patient also had right-sided loculated pleural effusion   No intra-abdominal abscess   Repeat CT chest 09/04/2020 shows resolution of right-sided pleural effusion  Patient is status post repair of tricuspid valve with annuloplasty on 09/10/2020; sternal wound shows no dehiscence, no drainage or erythema   -antibiotic plan as above  -close CT surgery follow-up     3   Right iliacus muscle abscesses/sacroiliitis:  CT scan of right lower extremity on 08/21/2020 showed 9 mm iliacus muscle collection, decreasing in size   Follow-up imaging with MRI done 08/31/2020 with evidence of sacroiliitis that is probably septic   There are no destructive changes or fluid collection   Suspect this is the cause of the patient's ongoing pain     CRP significantly improved, though remains slightly elevated now at 15 4  -antibiotics as above  - patient continues to complain of increasing right buttock pain, worsening upon ambulation and upon flexion and extension of the back; will repeat MRI sacroiliac joint, including right iliacus muscle to rule out any intramuscular collection or abscess associated with sacroiliac joint septic arthritis  if MRI negative, will stop IV antibiotics after last dose of cefazolin  is given on 2020        4  Active IV heroin abuse:   This is the causative factor for development of bacteremia and infective endocarditis   Patient has left AMA on prior occasions   HIV screen negative  -patient is not a candidate for at home PICC line/IV antibiotics  -acute pain service follow-up     5  Chronic HCV infection:   Recent HIV was negative   The LFTs are waxing waning  Review of Care everywhere shows high hepatitis C viral load 2020   -monitor LFTs   - outpatient follow-up with GI     6  Left upper extremity DVT:  in the setting of PICC line use   Patient now on anticoagulation  -anticoagulation as per primary and vascular team        Discussed the above management plan with the primary service  Plan mentioned above discussed with the patient     Antibiotics:  Cefazolin restart 43  Negative blood cultures 35  Postop ZLV 08    Subjective:  Patient has no fever, chills, sweats; no nausea, vomiting, diarrhea; no cough, shortness of breath; no pain  No new symptoms  Objective:  Vitals:  Temp:  [97 6 °F (36 4 °C)-98 4 °F (36 9 °C)] 98 °F (36 7 °C)  HR:  [] 99  Resp:  [18] 18  BP: ()/(54-66) 120/66  SpO2:  [95 %-97 %] 97 %  Temp (24hrs), Av 1 °F (36 7 °C), Min:97 6 °F (36 4 °C), Max:98 4 °F (36 9 °C)  Current: Temperature: 98 °F (36 7 °C)    Physical Exam:   General Appearance:  Alert, interactive, nontoxic, no acute distress  Throat: Oropharynx moist without lesions  Lungs:   Clear to auscultation bilaterally; no wheezes, rhonchi or rales; respirations unlabored   Heart:  RRR; no murmur, rub or gallop   Abdomen:   Soft, non-tender, non-distended, positive bowel sounds  Extremities: No clubbing, cyanosis or edema   Skin: No new rashes or lesions   No draining wounds noted         Labs, Imaging, & Other studies:   All pertinent labs and imaging studies were personally reviewed  Results from last 7 days   Lab Units 09/20/20  0900 09/19/20  0511 09/17/20  0520   WBC Thousand/uL 8 09  --   --    HEMOGLOBIN g/dL 9 0* 8 0* 7 8*   PLATELETS Thousands/uL 413*  --   --      Results from last 7 days   Lab Units 09/21/20  0909 09/20/20  0900 09/19/20  0511 09/17/20  0520   SODIUM mmol/L  --  139 142 141   POTASSIUM mmol/L  --  4 4 4 0 4 2   CHLORIDE mmol/L  --  107 107 106   CO2 mmol/L  --  28 28 28   BUN mg/dL  --  11 11 9   CREATININE mg/dL  --  0 59* 0 49* 0 43*   EGFR ml/min/1 73sq m  --  126 134 140   CALCIUM mg/dL  --  9 0 8 8 8 6   AST U/L 15  --   --   --    ALT U/L 8*  --   --   --    ALK PHOS U/L 119*  --   --   --              Results from last 7 days   Lab Units 09/21/20  0909   CRP mg/L 15 4*

## 2020-09-24 ENCOUNTER — APPOINTMENT (INPATIENT)
Dept: RADIOLOGY | Facility: HOSPITAL | Age: 30
DRG: 163 | End: 2020-09-24
Payer: COMMERCIAL

## 2020-09-24 DIAGNOSIS — R78.81 BACTEREMIA DUE TO STAPHYLOCOCCUS AUREUS: Primary | ICD-10-CM

## 2020-09-24 DIAGNOSIS — B95.61 BACTEREMIA DUE TO STAPHYLOCOCCUS AUREUS: Primary | ICD-10-CM

## 2020-09-24 LAB
INR PPP: 1.38 (ref 0.84–1.19)
PROTHROMBIN TIME: 16.9 SECONDS (ref 11.6–14.5)

## 2020-09-24 PROCEDURE — 99233 SBSQ HOSP IP/OBS HIGH 50: CPT | Performed by: INTERNAL MEDICINE

## 2020-09-24 PROCEDURE — 85610 PROTHROMBIN TIME: CPT | Performed by: PHYSICIAN ASSISTANT

## 2020-09-24 PROCEDURE — 99024 POSTOP FOLLOW-UP VISIT: CPT | Performed by: PHYSICIAN ASSISTANT

## 2020-09-24 PROCEDURE — A9585 GADOBUTROL INJECTION: HCPCS | Performed by: PHYSICIAN ASSISTANT

## 2020-09-24 PROCEDURE — 72197 MRI PELVIS W/O & W/DYE: CPT

## 2020-09-24 PROCEDURE — G1004 CDSM NDSC: HCPCS

## 2020-09-24 RX ORDER — IBUPROFEN 400 MG/1
800 TABLET ORAL EVERY 8 HOURS SCHEDULED
Status: DISCONTINUED | OUTPATIENT
Start: 2020-09-24 | End: 2020-09-28 | Stop reason: HOSPADM

## 2020-09-24 RX ORDER — WARFARIN SODIUM 5 MG/1
5 TABLET ORAL
Status: DISCONTINUED | OUTPATIENT
Start: 2020-09-24 | End: 2020-09-24

## 2020-09-24 RX ADMIN — OXYCODONE HYDROCHLORIDE 10 MG: 10 TABLET ORAL at 15:28

## 2020-09-24 RX ADMIN — GADOBUTROL 6 ML: 604.72 INJECTION INTRAVENOUS at 03:04

## 2020-09-24 RX ADMIN — SENNOSIDES 8.6 MG: 8.6 TABLET ORAL at 18:05

## 2020-09-24 RX ADMIN — HYDROMORPHONE HYDROCHLORIDE 1 MG: 1 INJECTION, SOLUTION INTRAMUSCULAR; INTRAVENOUS; SUBCUTANEOUS at 06:10

## 2020-09-24 RX ADMIN — MAGNESIUM OXIDE TAB 400 MG (241.3 MG ELEMENTAL MG) 400 MG: 400 (241.3 MG) TAB at 09:32

## 2020-09-24 RX ADMIN — CALCIUM ACETATE 667 MG: 667 CAPSULE ORAL at 09:32

## 2020-09-24 RX ADMIN — OXYCODONE HYDROCHLORIDE AND ACETAMINOPHEN 500 MG: 500 TABLET ORAL at 09:33

## 2020-09-24 RX ADMIN — OLANZAPINE 5 MG: 5 TABLET, FILM COATED ORAL at 20:00

## 2020-09-24 RX ADMIN — MAGNESIUM OXIDE TAB 400 MG (241.3 MG ELEMENTAL MG) 400 MG: 400 (241.3 MG) TAB at 18:06

## 2020-09-24 RX ADMIN — CALCIUM ACETATE 667 MG: 667 CAPSULE ORAL at 12:01

## 2020-09-24 RX ADMIN — Medication 250 MG: at 18:06

## 2020-09-24 RX ADMIN — LORAZEPAM 3 MG: 2 INJECTION INTRAMUSCULAR; INTRAVENOUS at 02:29

## 2020-09-24 RX ADMIN — IBUPROFEN 800 MG: 400 TABLET ORAL at 15:28

## 2020-09-24 RX ADMIN — ASPIRIN 81 MG CHEWABLE TABLET 81 MG: 81 TABLET CHEWABLE at 09:32

## 2020-09-24 RX ADMIN — MIRTAZAPINE 30 MG: 30 TABLET, FILM COATED ORAL at 20:01

## 2020-09-24 RX ADMIN — OXYCODONE HYDROCHLORIDE 10 MG: 10 TABLET ORAL at 09:31

## 2020-09-24 RX ADMIN — METHOCARBAMOL TABLETS 750 MG: 750 TABLET, COATED ORAL at 09:31

## 2020-09-24 RX ADMIN — HYDROMORPHONE HYDROCHLORIDE 1 MG: 1 INJECTION, SOLUTION INTRAMUSCULAR; INTRAVENOUS; SUBCUTANEOUS at 12:11

## 2020-09-24 RX ADMIN — FONDAPARINUX SODIUM 2.5 MG: 2.5 INJECTION, SOLUTION SUBCUTANEOUS at 12:03

## 2020-09-24 RX ADMIN — METHOCARBAMOL TABLETS 750 MG: 750 TABLET, COATED ORAL at 18:16

## 2020-09-24 RX ADMIN — IBUPROFEN 800 MG: 400 TABLET ORAL at 23:21

## 2020-09-24 RX ADMIN — SENNOSIDES 8.6 MG: 8.6 TABLET ORAL at 09:32

## 2020-09-24 RX ADMIN — LORAZEPAM 3 MG: 2 INJECTION INTRAMUSCULAR; INTRAVENOUS at 02:27

## 2020-09-24 RX ADMIN — HYDROMORPHONE HYDROCHLORIDE 1 MG: 1 INJECTION, SOLUTION INTRAMUSCULAR; INTRAVENOUS; SUBCUTANEOUS at 18:14

## 2020-09-24 RX ADMIN — Medication 1000 UNITS: at 09:31

## 2020-09-24 RX ADMIN — CEFAZOLIN SODIUM 2000 MG: 2 SOLUTION INTRAVENOUS at 09:33

## 2020-09-24 RX ADMIN — OXYCODONE HYDROCHLORIDE 10 MG: 10 TABLET ORAL at 20:00

## 2020-09-24 RX ADMIN — CEFAZOLIN SODIUM 2000 MG: 2 SOLUTION INTRAVENOUS at 23:30

## 2020-09-24 RX ADMIN — CEFAZOLIN SODIUM 2000 MG: 2 SOLUTION INTRAVENOUS at 00:21

## 2020-09-24 RX ADMIN — FERROUS SULFATE TAB 325 MG (65 MG ELEMENTAL FE) 325 MG: 325 (65 FE) TAB at 09:30

## 2020-09-24 RX ADMIN — APIXABAN 10 MG: 5 TABLET, FILM COATED ORAL at 18:05

## 2020-09-24 RX ADMIN — MELATONIN 6 MG: at 20:01

## 2020-09-24 RX ADMIN — CEFAZOLIN SODIUM 2000 MG: 2 SOLUTION INTRAVENOUS at 18:12

## 2020-09-24 RX ADMIN — METHOCARBAMOL TABLETS 750 MG: 750 TABLET, COATED ORAL at 23:21

## 2020-09-24 RX ADMIN — CALCIUM ACETATE 667 MG: 667 CAPSULE ORAL at 15:34

## 2020-09-24 RX ADMIN — HYDROMORPHONE HYDROCHLORIDE 1 MG: 1 INJECTION, SOLUTION INTRAMUSCULAR; INTRAVENOUS; SUBCUTANEOUS at 00:30

## 2020-09-24 RX ADMIN — PANTOPRAZOLE SODIUM 40 MG: 40 TABLET, DELAYED RELEASE ORAL at 09:33

## 2020-09-24 RX ADMIN — DULOXETINE HYDROCHLORIDE 60 MG: 60 CAPSULE, DELAYED RELEASE ORAL at 09:31

## 2020-09-24 RX ADMIN — OXYCODONE HYDROCHLORIDE 10 MG: 10 TABLET ORAL at 04:33

## 2020-09-24 RX ADMIN — Medication 250 MG: at 09:31

## 2020-09-24 NOTE — PROGRESS NOTES
Progress Note - Cardiothoracic Surgery   Shellye Nick 34 y o  female MRN: [id number]  Unit/Bed#: [facility] 421-01 Encounter: [id number]    Tricuspid Valve Endocarditis  S/P tricuspid valve repair; POD # 14    24 Hour Events: MRI complete  GI to evaluate today to review w/ patient hepatitis status/treatment plan/follow-up  Patient wants to go home Sunday evening s/p LD abx, will d/w APS plan so meds can be ready for that discharge by weekend      Medications:   Scheduled Meds:  Current Facility-Administered Medications   Medication Dose Route Frequency Provider Last Rate    acetaminophen  975 mg Oral Q8H PRN Diandra Doherty PA-C      ascorbic acid  500 mg Oral Daily Nimisha Shannon PA-C      aspirin  81 mg Oral Daily Marita Lewis PA-C      bisacodyl  10 mg Rectal Daily PRN Marita Lewis PA-C      calcium acetate  667 mg Oral TID With Meals Marita Lewis PA-C      calcium carbonate  1,000 mg Oral Daily PRN Marita Lewis PA-C      cefazolin  2,000 mg Intravenous Q8H Wiliam Álvarez MD 2,000 mg (09/24/20 0021)    cholecalciferol  1,000 Units Oral Daily Rosana Genao MD      dicyclomine  10 mg Oral TID PRN Marita Lewis PA-C      docusate sodium  100 mg Oral BID Marita Lewis PA-C      DULoxetine  60 mg Oral Daily Marita Lewis PA-C      ergocalciferol  50,000 Units Oral Once per day on Mon Wed Fri Lindsay Marcano PA-C      ferrous sulfate  325 mg Oral Daily With Breakfast Amara Browning PA-C      fondaparinux  2 5 mg Subcutaneous Q24H Nimisha Shannon PA-C      HYDROmorphone  1 mg Intravenous Q6H PRN Radha Katz PA-C      iohexol  50 mL Oral 90 min pre-procedure Marita Lewis PA-C      lidocaine  2 patch Topical Daily Marita Lewis PA-C      LORazepam  0 5 mg Intravenous Q4H PRN Peg MILLIE Katz      LORazepam  3 mg Intravenous Once Nimisha Shannon PA-C      magnesium oxide  400 mg Oral BID Amara Browning PA-C      melatonin  6 mg Oral HS Jose Longoria PA-C      methocarbamol  750 mg Oral [facility] Severiano Henriquez PA-C      miconazole   Topical BID Lam Cowan PA-C      mirtazapine  30 mg Oral HS mAara Browning PA-C      OLANZapine  5 mg Oral HS Amara Browning PA-C      ondansetron  4 mg Intravenous Q6H PRN Lam Cowan PA-C      oxyCODONE  10 mg Oral Q4H PRN Jose Longoria PA-C      pantoprazole  40 mg Oral Daily Lam Cowan PA-C      polyethylene glycol  17 g Oral Daily Lam Cowan PA-C      saccharomyces boulardii  250 mg Oral BID Lam Cowan PA-C      senna  1 tablet Oral BID Lam Cowan PA-C       Continuous Infusions:   PRN Meds:   acetaminophen    bisacodyl    calcium carbonate    dicyclomine    HYDROmorphone    LORazepam    ondansetron    oxyCODONE    Vitals:   Vitals:    09/22/20 2300 09/23/20 1129 09/23/20 1500 09/23/20 1900   BP: 107/58 120/66 142/58 92/53   BP Location: Left arm Right arm Right arm Right arm   Pulse: 94 99 76 64   Resp: 18 18 18 18   Temp: 97 6 °F (36 4 °C) 98 °F (36 7 °C) 97 5 °F (36 4 °C) 97 7 °F (36 5 °C)   TempSrc: Oral Oral Oral Oral   SpO2: 97%  96%    Weight:       Height:         Bp x24hrs: /50-90    Telemetry: NSR; Heart Rate: 82    Respiratory:   SpO2: SpO2: 96 %, SpO2 Activity: SpO2 Activity: At Rest, SpO2 Device: O2 Device: None (Room air); Room Air    Intake/Output:   I/O       09/22 0701 - 09/23 0700 09/23 0701 - 09/24 0700 09/24 0701 - 09/25 0700    P  O  558 590     IV Piggyback 50      Total Intake(mL/kg) 608 (9) 590 (8 7)     Net +608 +590                UOP - none recorded/24hrs    Chest tube Output:  CTs & EPWs have been discontinued    Weights:   Weight (last 2 days)     Date/Time   Weight    09/22/20 0600   67 9 (149 69)            Admit weight: 79 3kg - no new weight today    Results:   NO NEW CBC OR BMP TODAY  Results from last 7 days   Lab Units 09/20/20  0900 09/19/20  0511   WBC Thousand/uL 8 09  --    HEMOGLOBIN g/dL 9 0* 8 0* HEMATOCRIT % 28 6* 25 1*   PLATELETS Thousands/uL 413*  --      Results from last 7 days   Lab Units 09/20/20  0900 09/19/20  0511   SODIUM mmol/L 139 142   POTASSIUM mmol/L 4 4 4 0   CHLORIDE mmol/L 107 107   CO2 mmol/L 28 28   BUN mg/dL 11 11   CREATININE mg/dL 0 59* 0 49*   CALCIUM mg/dL 9 0 8 8     Results from last 7 days   Lab Units 09/24/20  0438 09/23/20  1120 09/23/20  0546   INR  1 38* 1 40* 1 53*     Point of care glucose: none recorded/24hrs    Studies:  MRI 9/23: pending    I have personally reviewed pertinent reports  and I have personally reviewed pertinent films in PACS    Invasive Lines/Tubes:  Invasive Devices     Peripherally Inserted Central Catheter Line            PICC Line 08/26/20 28 days                Physical Exam:    HEENT/NECK:  PERRLA  No jugular venous distention  Cardiac: Regular rate and rhythm  Pulmonary:  Breath sounds clear bilaterally  Abdomen:  Normal bowel sounds  Incisions: Sternum is stable  Incision is clean, dry, and intact  Extremities: Extremities warm/dry  Neuro: Alert and oriented X 3  Skin: Warm/Dry, without rashes or lesions  Assessment:  Principal Problem:    Bacteremia due to Staphylococcus aureus  Active Problems:    Acute bacterial endocarditis    Septic embolism (HCC)    Bipolar 1 disorder (HCC)    Right hip pain    Intravenous drug abuse ([address] )    DVT of axillary vein, acute left (HCC)    Transaminitis    Rash    Anemia    Hyperphosphatemia    S/P TVR (tricuspid valve repair)       Tricuspid Valve Endocarditis  S/P tricuspid valve repair; POD # 14    Plan:    1  Cardiac:   NSR; HR/BP well-controlled  Hold beta blocker for prior hypotension  INR pending, dose Coumadin tonight  Continue ASA therapy  Statin no tindicated  Epicardial pacing wires out  PICC for abx  Continue DVT prophylaxis    2  Pulmonary:   Good Room air oxygen saturation; Continue incentive spirometry/Coughing/Deep breathing exercises  Chest tubes have been discontinued    3   Renal: Intake/Output net: (+)590 mL/24 hours -- likely (-) given innacurate I/Os  Autodiuresing well    4  Neuro:  Neurologically intact; No active issues  Incisional pain well-controlled  Continue Tylenol, 975 mg PO q 8, standing dose  Continue Oxycodone,10 mg PO q 4 hours prn pain    Continue Lidoderm patch    Continue Ativan    Continue Dilaudid    Continue Robaxin    APS following   Continue Melatonin   Continue Cymbalta   Continue Remeron   Continue Zyprexa    5  GI:  Tolerating full regular house diet  Maintain 1800 mL daily fluid restriction   Continue stool softeners and prn suppository  Continue GI prophylaxis  GI to d/w patient hepatitis status/traeatment    6  Endo:   No history of diabetes; no SSIC in house   Continue calcium/vitamin D supplementation    7    Hematology:    Continue iron/vitamin C supplementation  Endocarditis  Iris Berks                          ID following                          Ancef thorough 9/27                          MRI pending for sacroilitis    8  Disposition:      Ambulating independently, Anticipate discharge to home Sunday s/p LD abx per patient request     VTE Pharmacologic Prophylaxis: Fondaparinux (Arixtra)  VTE Mechanical Prophylaxis: sequential compression device    Collaborative rounds completed with SARAH Cotton    Plan of care discussed with bedside nurse    SIGNATURE: Yobani Talavera PA-C  DATE: September 24, 2020  TIME: 7:42 AM

## 2020-09-24 NOTE — PLAN OF CARE
Problem: Prexisting or High Potential for Compromised Skin Integrity  Goal: Skin integrity is maintained or improved  Description: INTERVENTIONS:  - Identify patients at risk for skin breakdown  - Assess and monitor skin integrity  - Assess and monitor nutrition and hydration status  - Monitor labs   - Assess for incontinence   - Turn and reposition patient  - Assist with mobility/ambulation  - Relieve pressure over bony prominences  - Avoid friction and shearing  - Provide appropriate hygiene as needed including keeping skin clean and dry  - Evaluate need for skin moisturizer/barrier cream  - Collaborate with interdisciplinary team   - Patient/family teaching  - Consider wound care consult   Outcome: Progressing     Problem: PAIN - ADULT  Goal: Verbalizes/displays adequate comfort level or baseline comfort level  Description: Interventions:  - Encourage patient to monitor pain and request assistance  - Assess pain using appropriate pain scale  - Administer analgesics based on type and severity of pain and evaluate response  - Implement non-pharmacological measures as appropriate and evaluate response  - Consider cultural and social influences on pain and pain management  - Notify physician/advanced practitioner if interventions unsuccessful or patient reports new pain  Outcome: Progressing     Problem: INFECTION - ADULT  Goal: Absence or prevention of progression during hospitalization  Description: INTERVENTIONS:  - Assess and monitor for signs and symptoms of infection  - Monitor lab/diagnostic results  - Monitor all insertion sites, i e  indwelling lines, tubes, and drains  - Monitor endotracheal if appropriate and nasal secretions for changes in amount and color  - [place] appropriate cooling/warming therapies per order  - Administer medications as ordered  - Instruct and encourage patient and family to use good hand hygiene technique  - Identify and instruct in appropriate isolation precautions for identified infection/condition  Outcome: Progressing  Goal: Absence of fever/infection during neutropenic period  Description: INTERVENTIONS:  - Monitor WBC    Outcome: Progressing     Problem: SAFETY ADULT  Goal: Patient will remain free of falls  Description: INTERVENTIONS:  - Assess patient frequently for physical needs  -  Identify cognitive and physical deficits and behaviors that affect risk of falls    -  [place] fall precautions as indicated by assessment   - Educate patient/family on patient safety including physical limitations  - Instruct patient to call for assistance with activity based on assessment  - Modify environment to reduce risk of injury  - Consider OT/PT consult to assist with strengthening/mobility  Outcome: Progressing  Goal: Maintain or return to baseline ADL function  Description: INTERVENTIONS:  -  Assess patient's ability to carry out ADLs; assess patient's baseline for ADL function and identify physical deficits which impact ability to perform ADLs (bathing, care of mouth/teeth, toileting, grooming, dressing, etc )  - Assess/evaluate cause of self-care deficits   - Assess range of motion  - Assess patient's mobility; develop plan if impaired  - Assess patient's need for assistive devices and provide as appropriate  - Encourage maximum independence but intervene and supervise when necessary  - Involve family in performance of ADLs  - Assess for home care needs following discharge   - Consider OT consult to assist with ADL evaluation and planning for discharge  - Provide patient education as appropriate  Outcome: Progressing  Goal: Maintain or return mobility status to optimal level  Description: INTERVENTIONS:  - Assess patient's baseline mobility status (ambulation, transfers, stairs, etc )    - Identify cognitive and physical deficits and behaviors that affect mobility  - Identify mobility aids required to assist with transfers and/or ambulation (gait belt, sit-to-stand, lift, walker, cane, etc )  - [place] fall precautions as indicated by assessment  - Record patient progress and toleration of activity level on Mobility SBAR; progress patient to next Phase/Stage  - Instruct patient to call for assistance with activity based on assessment  - Consider rehabilitation consult to assist with strengthening/weightbearing, etc   Outcome: Progressing     Problem: CARDIOVASCULAR - ADULT  Goal: Maintains optimal cardiac output and hemodynamic stability  Description: INTERVENTIONS:  - Monitor I/O, vital signs and rhythm  - Monitor for S/S and trends of decreased cardiac output  - Administer and titrate ordered vasoactive medications to optimize hemodynamic stability  - Assess quality of pulses, skin color and temperature  - Assess for signs of decreased coronary artery perfusion  - Instruct patient to report change in severity of symptoms  Outcome: Progressing  Goal: Absence of cardiac dysrhythmias or at baseline rhythm  Description: INTERVENTIONS:  - Continuous cardiac monitoring, vital signs, obtain 12 lead EKG if ordered  - Administer antiarrhythmic and heart rate control medications as ordered  - Monitor electrolytes and administer replacement therapy as ordered  Outcome: Progressing     Problem: METABOLIC, FLUID AND ELECTROLYTES - ADULT  Goal: Electrolytes maintained within normal limits  Description: INTERVENTIONS:  - Monitor labs and assess patient for signs and symptoms of electrolyte imbalances  - Administer electrolyte replacement as ordered  - Monitor response to electrolyte replacements, including repeat lab results as appropriate  - Instruct patient on fluid and nutrition as appropriate  Outcome: Progressing  Goal: Fluid balance maintained  Description: INTERVENTIONS:  - Monitor labs   - Monitor I/O and WT  - Instruct patient on fluid and nutrition as appropriate  - Assess for signs & symptoms of volume excess or deficit  Outcome: Progressing  Goal: Glucose maintained within target range  Description: INTERVENTIONS:  - Monitor Blood Glucose as ordered  - Assess for signs and symptoms of hyperglycemia and hypoglycemia  - Administer ordered medications to maintain glucose within target range  - Assess nutritional intake and initiate nutrition service referral as needed  Outcome: Progressing     Problem: HEMATOLOGIC - ADULT  Goal: Maintains hematologic stability  Description: INTERVENTIONS  - Assess for signs and symptoms of bleeding or hemorrhage  - Monitor labs  - Administer supportive blood products/factors as ordered and appropriate  Outcome: Progressing     Problem: Potential for Falls  Goal: Patient will remain free of falls  Description: INTERVENTIONS:  - Assess patient frequently for physical needs  -  Identify cognitive and physical deficits and behaviors that affect risk of falls  -  [place] fall precautions as indicated by assessment   - Educate patient/family on patient safety including physical limitations  - Instruct patient to call for assistance with activity based on assessment  - Modify environment to reduce risk of injury  - Consider OT/PT consult to assist with strengthening/mobility  Outcome: Progressing     Problem: Nutrition/Hydration-ADULT  Goal: Nutrient/Hydration intake appropriate for improving, restoring or maintaining nutritional needs  Description: Monitor and assess patient's nutrition/hydration status for malnutrition  Collaborate with interdisciplinary team and initiate plan and interventions as ordered  Monitor patient's weight and dietary intake as ordered or per policy  Utilize nutrition screening tool and intervene as necessary  Determine patient's food preferences and provide high-protein, high-caloric foods as appropriate       INTERVENTIONS:  - Monitor oral intake, urinary output, labs, and treatment plans  - Assess nutrition and hydration status and recommend course of action  - Evaluate amount of meals eaten  - Assist patient with eating if necessary - Allow adequate time for meals  - Recommend/ encourage appropriate diets, oral nutritional supplements, and vitamin/mineral supplements  - Order, calculate, and assess calorie counts as needed  - Recommend, monitor, and adjust tube feedings and TPN/PPN based on assessed needs  - Assess need for intravenous fluids  - Provide specific nutrition/hydration education as appropriate  - Include patient/family/caregiver in decisions related to nutrition  Outcome: Progressing     Problem: Knowledge Deficit  Goal: Patient/family/caregiver demonstrates understanding of disease process, treatment plan, medications, and discharge instructions  Description: Complete learning assessment and assess knowledge base    Interventions:  - Provide teaching at level of understanding  - Provide teaching via preferred learning methods  Outcome: Progressing     Problem: Prexisting or High Potential for Compromised Skin Integrity  Goal: Skin integrity is maintained or improved  Description: INTERVENTIONS:  - Identify patients at risk for skin breakdown  - Assess and monitor skin integrity  - Assess and monitor nutrition and hydration status  - Monitor labs   - Assess for incontinence   - Turn and reposition patient  - Assist with mobility/ambulation  - Relieve pressure over bony prominences  - Avoid friction and shearing  - Provide appropriate hygiene as needed including keeping skin clean and dry  - Evaluate need for skin moisturizer/barrier cream  - Collaborate with interdisciplinary team   - Patient/family teaching  - Consider wound care consult   Outcome: Progressing     Problem: PAIN - ADULT  Goal: Verbalizes/displays adequate comfort level or baseline comfort level  Description: Interventions:  - Encourage patient to monitor pain and request assistance  - Assess pain using appropriate pain scale  - Administer analgesics based on type and severity of pain and evaluate response  - Implement non-pharmacological measures as appropriate and evaluate response  - Consider cultural and social influences on pain and pain management  - Notify physician/advanced practitioner if interventions unsuccessful or patient reports new pain  Outcome: Progressing     Problem: INFECTION - ADULT  Goal: Absence or prevention of progression during hospitalization  Description: INTERVENTIONS:  - Assess and monitor for signs and symptoms of infection  - Monitor lab/diagnostic results  - Monitor all insertion sites, i e  indwelling lines, tubes, and drains  - Monitor endotracheal if appropriate and nasal secretions for changes in amount and color  - [place] appropriate cooling/warming therapies per order  - Administer medications as ordered  - Instruct and encourage patient and family to use good hand hygiene technique  - Identify and instruct in appropriate isolation precautions for identified infection/condition  Outcome: Progressing  Goal: Absence of fever/infection during neutropenic period  Description: INTERVENTIONS:  - Monitor WBC    Outcome: Progressing     Problem: SAFETY ADULT  Goal: Patient will remain free of falls  Description: INTERVENTIONS:  - Assess patient frequently for physical needs  -  Identify cognitive and physical deficits and behaviors that affect risk of falls    -  [place] fall precautions as indicated by assessment   - Educate patient/family on patient safety including physical limitations  - Instruct patient to call for assistance with activity based on assessment  - Modify environment to reduce risk of injury  - Consider OT/PT consult to assist with strengthening/mobility  Outcome: Progressing  Goal: Maintain or return to baseline ADL function  Description: INTERVENTIONS:  -  Assess patient's ability to carry out ADLs; assess patient's baseline for ADL function and identify physical deficits which impact ability to perform ADLs (bathing, care of mouth/teeth, toileting, grooming, dressing, etc )  - Assess/evaluate cause of self-care deficits   - Assess range of motion  - Assess patient's mobility; develop plan if impaired  - Assess patient's need for assistive devices and provide as appropriate  - Encourage maximum independence but intervene and supervise when necessary  - Involve family in performance of ADLs  - Assess for home care needs following discharge   - Consider OT consult to assist with ADL evaluation and planning for discharge  - Provide patient education as appropriate  Outcome: Progressing  Goal: Maintain or return mobility status to optimal level  Description: INTERVENTIONS:  - Assess patient's baseline mobility status (ambulation, transfers, stairs, etc )    - Identify cognitive and physical deficits and behaviors that affect mobility  - Identify mobility aids required to assist with transfers and/or ambulation (gait belt, sit-to-stand, lift, walker, cane, etc )  - [place] fall precautions as indicated by assessment  - Record patient progress and toleration of activity level on Mobility SBAR; progress patient to next Phase/Stage  - Instruct patient to call for assistance with activity based on assessment  - Consider rehabilitation consult to assist with strengthening/weightbearing, etc   Outcome: Progressing     Problem: CARDIOVASCULAR - ADULT  Goal: Maintains optimal cardiac output and hemodynamic stability  Description: INTERVENTIONS:  - Monitor I/O, vital signs and rhythm  - Monitor for S/S and trends of decreased cardiac output  - Administer and titrate ordered vasoactive medications to optimize hemodynamic stability  - Assess quality of pulses, skin color and temperature  - Assess for signs of decreased coronary artery perfusion  - Instruct patient to report change in severity of symptoms  Outcome: Progressing  Goal: Absence of cardiac dysrhythmias or at baseline rhythm  Description: INTERVENTIONS:  - Continuous cardiac monitoring, vital signs, obtain 12 lead EKG if ordered  - Administer antiarrhythmic and heart rate control medications as ordered  - Monitor electrolytes and administer replacement therapy as ordered  Outcome: Progressing     Problem: METABOLIC, FLUID AND ELECTROLYTES - ADULT  Goal: Electrolytes maintained within normal limits  Description: INTERVENTIONS:  - Monitor labs and assess patient for signs and symptoms of electrolyte imbalances  - Administer electrolyte replacement as ordered  - Monitor response to electrolyte replacements, including repeat lab results as appropriate  - Instruct patient on fluid and nutrition as appropriate  Outcome: Progressing  Goal: Fluid balance maintained  Description: INTERVENTIONS:  - Monitor labs   - Monitor I/O and WT  - Instruct patient on fluid and nutrition as appropriate  - Assess for signs & symptoms of volume excess or deficit  Outcome: Progressing  Goal: Glucose maintained within target range  Description: INTERVENTIONS:  - Monitor Blood Glucose as ordered  - Assess for signs and symptoms of hyperglycemia and hypoglycemia  - Administer ordered medications to maintain glucose within target range  - Assess nutritional intake and initiate nutrition service referral as needed  Outcome: Progressing     Problem: HEMATOLOGIC - ADULT  Goal: Maintains hematologic stability  Description: INTERVENTIONS  - Assess for signs and symptoms of bleeding or hemorrhage  - Monitor labs  - Administer supportive blood products/factors as ordered and appropriate  Outcome: Progressing     Problem: Potential for Falls  Goal: Patient will remain free of falls  Description: INTERVENTIONS:  - Assess patient frequently for physical needs  -  Identify cognitive and physical deficits and behaviors that affect risk of falls    -  [place] fall precautions as indicated by assessment   - Educate patient/family on patient safety including physical limitations  - Instruct patient to call for assistance with activity based on assessment  - Modify environment to reduce risk of injury  - Consider OT/PT consult to assist with strengthening/mobility  Outcome: Progressing     Problem: Nutrition/Hydration-ADULT  Goal: Nutrient/Hydration intake appropriate for improving, restoring or maintaining nutritional needs  Description: Monitor and assess patient's nutrition/hydration status for malnutrition  Collaborate with interdisciplinary team and initiate plan and interventions as ordered  Monitor patient's weight and dietary intake as ordered or per policy  Utilize nutrition screening tool and intervene as necessary  Determine patient's food preferences and provide high-protein, high-caloric foods as appropriate  INTERVENTIONS:  - Monitor oral intake, urinary output, labs, and treatment plans  - Assess nutrition and hydration status and recommend course of action  - Evaluate amount of meals eaten  - Assist patient with eating if necessary   - Allow adequate time for meals  - Recommend/ encourage appropriate diets, oral nutritional supplements, and vitamin/mineral supplements  - Order, calculate, and assess calorie counts as needed  - Recommend, monitor, and adjust tube feedings and TPN/PPN based on assessed needs  - Assess need for intravenous fluids  - Provide specific nutrition/hydration education as appropriate  - Include patient/family/caregiver in decisions related to nutrition  Outcome: Progressing     Problem: Knowledge Deficit  Goal: Patient/family/caregiver demonstrates understanding of disease process, treatment plan, medications, and discharge instructions  Description: Complete learning assessment and assess knowledge base    Interventions:  - Provide teaching at level of understanding  - Provide teaching via preferred learning methods  Outcome: Progressing

## 2020-09-24 NOTE — PROGRESS NOTES
Progress Note - Infectious Disease   Verna Quintanilla 34 y o  female MRN: [id number]  Unit/Bed#: [facility] 421-01 Encounter: [id number]      Impression/Plan:    1  Recurrent MSSA bacteremia :  Prolonged course of IV antibiotic was previously recommended, but patient has left AMA on 2 prior occasions   She remains hemodynamically stable   Repeat blood culture negative on 08/15/2020; patient is postop day 13 for tricuspid valve surgery; operative culture  negative  -continue cefazolin 2 g IV q 8 hours  through 9/27/2020 to complete 6 weeks from the first negative blood culture       2  Tricuspid valve endocarditis, 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:  MELISSA done 07/21/2020 showed large vegetation on TV with severe regurgitation  7/15 CT abdomen/pelvis also showed bilateral renal parenchymal abnormalities concerning for septic emboli, patient also had right-sided loculated pleural effusion   No intra-abdominal abscess   Repeat CT chest 09/04/2020 shows resolution of right-sided pleural effusion  Patient is status post repair of tricuspid valve with annuloplasty on 09/10/2020; sternal wound shows no dehiscence, no drainage or erythema   -antibiotic plan as above  -close CT surgery follow-up     3   Right iliacus muscle abscesses/sacroiliitis:  CT scan of right lower extremity on 08/21/2020 showed 9 mm iliacus muscle collection, decreasing in size   Follow-up imaging with MRI done 08/31/2020 with evidence of sacroiliitis that is probably septic   There are no destructive changes or fluid collection   Suspect this is the cause of the patient's ongoing pain     CRP significantly improved, though remains slightly elevated now at 15 4  MRI of sacroiliac joint done this morning 09/24/2020 shows no intramuscular abscess;   -continue cefazolin IV through 09/27/2020, then remove PICC line before discharge;  - at discharge transition patient to cefadroxil 500 mg p o  Q 12 hours until CRP normalizes;  - patient to follow-up in ID office 2-4 weeks after discharge; she will need repeat CBC, creatinine, CRP prior to her appointment        4  Active IV heroin abuse:   This is the causative factor for development of bacteremia and infective endocarditis   Patient has left AMA on prior occasions   HIV screen negative  -patient is not a candidate for at home PICC line/IV antibiotics  -acute pain service follow-up     5  Chronic HCV infection:   Recent HIV was negative   The LFTs are waxing waning  Review of Care everywhere shows high hepatitis C viral load 2020   - outpatient follow-up with GI     6  Left upper extremity DVT:  in the setting of PICC line use   Patient now on anticoagulation  -anticoagulation as per primary and vascular team        Discussed the above management plan with the primary service  Plan mentioned above discussed with the patient     Antibiotics:  Cefazolin restart 44  Negative blood cultures 36  Postop day 13    Subjective:  Patient has no fever, chills, sweats; no nausea, vomiting, diarrhea; no cough, shortness of breath; complains of persistent right buttock pain    Objective:  Vitals:  Temp:  [97 5 °F (36 4 °C)-98 °F (36 7 °C)] 98 °F (36 7 °C)  HR:  [] 108  Resp:  [18] 18  BP: ()/(53-90) 120/90  SpO2:  [96 %] 96 %  Temp (24hrs), Av 7 °F (36 5 °C), Min:97 5 °F (36 4 °C), Max:98 °F (36 7 °C)  Current: Temperature: 98 °F (36 7 °C)    Physical Exam:   General Appearance:  Alert, interactive, nontoxic, no acute distress  Throat: Oropharynx moist without lesions  Lungs:   Clear to auscultation bilaterally; no wheezes, rhonchi or rales; respirations unlabored   Heart:  RRR; no murmur, rub or gallop   Abdomen:   Soft, non-tender, non-distended, positive bowel sounds  Extremities: No clubbing, cyanosis or edema   Skin: No new rashes or lesions  No draining wounds noted         Labs, Imaging, & Other studies:   All pertinent labs and imaging studies were personally reviewed  Results from last 7 days   Lab Units 09/20/20 0900 09/19/20  0511   WBC [place]/uL 8 09  --    HEMOGLOBIN g/dL 9 0* 8 0*   PLATELETS [place]/uL 413*  --      Results from last 7 days   Lab Units 09/21/20  0909 09/20/20 0900 09/19/20  0511   SODIUM mmol/L  --  139 142   POTASSIUM mmol/L  --  4 4 4 0   CHLORIDE mmol/L  --  107 107   CO2 mmol/L  --  28 28   BUN mg/dL  --  11 11   CREATININE mg/dL  --  0 59* 0 49*   EGFR ml/min/1 73sq m  --  126 134   CALCIUM mg/dL  --  9 0 8 8   AST U/L 15  --   --    ALT U/L 8*  --   --    ALK PHOS U/L 119*  --   --              Results from last 7 days   Lab Units 09/21/20  0909   CRP mg/L 15 4*

## 2020-09-25 PROCEDURE — 99232 SBSQ HOSP IP/OBS MODERATE 35: CPT | Performed by: INTERNAL MEDICINE

## 2020-09-25 PROCEDURE — 99232 SBSQ HOSP IP/OBS MODERATE 35: CPT | Performed by: PHYSICIAN ASSISTANT

## 2020-09-25 PROCEDURE — 99024 POSTOP FOLLOW-UP VISIT: CPT | Performed by: PHYSICIAN ASSISTANT

## 2020-09-25 RX ADMIN — IBUPROFEN 800 MG: 400 TABLET ORAL at 14:30

## 2020-09-25 RX ADMIN — CALCIUM ACETATE 667 MG: 667 CAPSULE ORAL at 17:58

## 2020-09-25 RX ADMIN — HYDROMORPHONE HYDROCHLORIDE 1 MG: 1 INJECTION, SOLUTION INTRAMUSCULAR; INTRAVENOUS; SUBCUTANEOUS at 05:59

## 2020-09-25 RX ADMIN — HYDROMORPHONE HYDROCHLORIDE 1 MG: 1 INJECTION, SOLUTION INTRAMUSCULAR; INTRAVENOUS; SUBCUTANEOUS at 17:53

## 2020-09-25 RX ADMIN — OXYCODONE HYDROCHLORIDE AND ACETAMINOPHEN 500 MG: 500 TABLET ORAL at 08:38

## 2020-09-25 RX ADMIN — MIRTAZAPINE 30 MG: 30 TABLET, FILM COATED ORAL at 20:42

## 2020-09-25 RX ADMIN — METOPROLOL TARTRATE 25 MG: 25 TABLET, FILM COATED ORAL at 14:30

## 2020-09-25 RX ADMIN — APIXABAN 10 MG: 5 TABLET, FILM COATED ORAL at 17:57

## 2020-09-25 RX ADMIN — ERGOCALCIFEROL 50000 UNITS: 1.25 CAPSULE ORAL at 08:27

## 2020-09-25 RX ADMIN — MAGNESIUM OXIDE TAB 400 MG (241.3 MG ELEMENTAL MG) 400 MG: 400 (241.3 MG) TAB at 18:00

## 2020-09-25 RX ADMIN — Medication 250 MG: at 08:19

## 2020-09-25 RX ADMIN — APIXABAN 10 MG: 5 TABLET, FILM COATED ORAL at 08:19

## 2020-09-25 RX ADMIN — HYDROMORPHONE HYDROCHLORIDE 1 MG: 1 INJECTION, SOLUTION INTRAMUSCULAR; INTRAVENOUS; SUBCUTANEOUS at 00:03

## 2020-09-25 RX ADMIN — CEFAZOLIN SODIUM 2000 MG: 2 SOLUTION INTRAVENOUS at 08:28

## 2020-09-25 RX ADMIN — CALCIUM ACETATE 667 MG: 667 CAPSULE ORAL at 11:55

## 2020-09-25 RX ADMIN — IBUPROFEN 800 MG: 400 TABLET ORAL at 05:59

## 2020-09-25 RX ADMIN — HYDROMORPHONE HYDROCHLORIDE 1 MG: 1 INJECTION, SOLUTION INTRAMUSCULAR; INTRAVENOUS; SUBCUTANEOUS at 23:52

## 2020-09-25 RX ADMIN — LORAZEPAM 0.5 MG: 2 INJECTION INTRAMUSCULAR; INTRAVENOUS at 14:30

## 2020-09-25 RX ADMIN — IBUPROFEN 800 MG: 400 TABLET ORAL at 21:53

## 2020-09-25 RX ADMIN — OXYCODONE HYDROCHLORIDE 10 MG: 10 TABLET ORAL at 10:23

## 2020-09-25 RX ADMIN — CEFAZOLIN SODIUM 2000 MG: 2 SOLUTION INTRAVENOUS at 23:41

## 2020-09-25 RX ADMIN — OXYCODONE HYDROCHLORIDE 10 MG: 10 TABLET ORAL at 21:54

## 2020-09-25 RX ADMIN — METOPROLOL TARTRATE 25 MG: 25 TABLET, FILM COATED ORAL at 20:42

## 2020-09-25 RX ADMIN — OLANZAPINE 5 MG: 5 TABLET, FILM COATED ORAL at 20:42

## 2020-09-25 RX ADMIN — PANTOPRAZOLE SODIUM 40 MG: 40 TABLET, DELAYED RELEASE ORAL at 08:20

## 2020-09-25 RX ADMIN — FERROUS SULFATE TAB 325 MG (65 MG ELEMENTAL FE) 325 MG: 325 (65 FE) TAB at 08:18

## 2020-09-25 RX ADMIN — HYDROMORPHONE HYDROCHLORIDE 1 MG: 1 INJECTION, SOLUTION INTRAMUSCULAR; INTRAVENOUS; SUBCUTANEOUS at 11:55

## 2020-09-25 RX ADMIN — SENNOSIDES 8.6 MG: 8.6 TABLET ORAL at 18:01

## 2020-09-25 RX ADMIN — CEFAZOLIN SODIUM 2000 MG: 2 SOLUTION INTRAVENOUS at 17:59

## 2020-09-25 RX ADMIN — SENNOSIDES 8.6 MG: 8.6 TABLET ORAL at 08:28

## 2020-09-25 RX ADMIN — METHOCARBAMOL TABLETS 750 MG: 750 TABLET, COATED ORAL at 17:58

## 2020-09-25 RX ADMIN — Medication 250 MG: at 17:58

## 2020-09-25 RX ADMIN — CALCIUM ACETATE 667 MG: 667 CAPSULE ORAL at 08:18

## 2020-09-25 RX ADMIN — OXYCODONE HYDROCHLORIDE 10 MG: 10 TABLET ORAL at 14:30

## 2020-09-25 RX ADMIN — ASPIRIN 81 MG CHEWABLE TABLET 81 MG: 81 TABLET CHEWABLE at 08:18

## 2020-09-25 RX ADMIN — Medication 1000 UNITS: at 08:19

## 2020-09-25 RX ADMIN — DULOXETINE HYDROCHLORIDE 60 MG: 60 CAPSULE, DELAYED RELEASE ORAL at 08:18

## 2020-09-25 RX ADMIN — LORAZEPAM 0.5 MG: 2 INJECTION INTRAMUSCULAR; INTRAVENOUS at 20:47

## 2020-09-25 RX ADMIN — MAGNESIUM OXIDE TAB 400 MG (241.3 MG ELEMENTAL MG) 400 MG: 400 (241.3 MG) TAB at 08:18

## 2020-09-25 RX ADMIN — MELATONIN 6 MG: at 20:42

## 2020-09-25 RX ADMIN — OXYCODONE HYDROCHLORIDE 10 MG: 10 TABLET ORAL at 02:42

## 2020-09-25 RX ADMIN — METHOCARBAMOL TABLETS 750 MG: 750 TABLET, COATED ORAL at 08:18

## 2020-09-25 RX ADMIN — LORAZEPAM 0.5 MG: 2 INJECTION INTRAMUSCULAR; INTRAVENOUS at 08:18

## 2020-09-25 NOTE — PROGRESS NOTES
Progress Note - Cardiothoracic Surgery   Claude Hernandez 34 y o  female MRN: [id number]  Unit/Bed#: [facility] 421-01 Encounter: [id number]  Tricuspid Valve Endocarditis  S/P tricuspid valve repair; POD # 15    24 Hour Events: Transitioned to NOAC for DVT for discharge  No other events  No complaints      Medications:   Scheduled Meds:  Current Facility-Administered Medications   Medication Dose Route Frequency Provider Last Rate    acetaminophen  975 mg Oral Q8H PRN [employer], MILLIE      apixaban  10 mg Oral BID [place] Damián, MILLIE      ascorbic acid  500 mg Oral Daily M D C  [employer], MILLIE      aspirin  81 mg Oral Daily Dexter, MILLIE      bisacodyl  10 mg Rectal Daily PRN Dexter, MILLIE      calcium acetate  667 mg Oral TID With Meals Dexter, MILLIE      calcium carbonate  1,000 mg Oral Daily PRN Dexter, MILLIE      cefazolin  2,000 mg Intravenous Q8H Wiliam Álvarez MD 2,000 mg (09/25/20 0828)    cholecalciferol  1,000 Units Oral Daily Nellie Henley MD      dicyclomine  10 mg Oral TID PRN Dexter, MILLIE      docusate sodium  100 mg Oral BID Dexter, MILLIE      DULoxetine  60 mg Oral Daily Dexter, MILLIE      ergocalciferol  50,000 Units Oral Once per day on Mon Wed Fri Lindsay Marcano PA-C      ferrous sulfate  325 mg Oral Daily With Breakfast M D C  [employer], MILLIE      HYDROmorphone  1 mg Intravenous Q6H PRN Alec Slaughter PA-C      ibuprofen  800 mg Oral Q8H [address] Alec Slaughter, MILLIE      iohexol  50 mL Oral 90 min pre-procedure Dexter, MILLIE      lidocaine  2 patch Topical Daily Dexter, MILLIE      LORazepam  0 5 mg Intravenous Q4H PRN Alec Slaughter PA-C      LORazepam  3 mg Intravenous Once M D C  [employer], MILLIE      magnesium oxide  400 mg Oral BID Amara Browning PA-C      melatonin  6 mg Oral HS M D C  [employer], MILLIE      methocarbamol  750 mg Oral [facility] [employer], MILLIE      miconazole Topical BID Stana PachecoMILLIE chacon      mirtazapine  30 mg Oral HS Amara Browning PA-C      OLANZapine  5 mg Oral HS Amara Browning PA-C      ondansetron  4 mg Intravenous Q6H PRN Stana Tara, MILLIE      oxyCODONE  10 mg Oral Q4H PRN Crystal Gonzalez PA-C      pantoprazole  40 mg Oral Daily Stana Pacheco, MILLIE      polyethylene glycol  17 g Oral Daily Wisam PachecoMILLIE chacon      saccharomyces boulardii  250 mg Oral BID Stana Pacheco, MILLIE      senna  1 tablet Oral BID Stana PachecoMILLIE chacon       Continuous Infusions:   PRN Meds:   acetaminophen    bisacodyl    calcium carbonate    dicyclomine    HYDROmorphone    LORazepam    ondansetron    oxyCODONE    Vitals:   Vitals:    09/24/20 1900 09/24/20 2300 09/25/20 0600 09/25/20 0715   BP: 107/63 133/83  118/58   BP Location: Left arm Left arm  Left arm   Pulse: 77 75  78   Resp: 18 18  20   Temp: 97 6 °F (36 4 °C) 98 1 °F (36 7 °C)  98 3 °F (36 8 °C)   TempSrc: Oral Oral  Oral   SpO2:       Weight:   70 5 kg (155 lb 6 8 oz)    Height:           Telemetry: NSR; Heart Rate: 69    Respiratory:   SpO2: SpO2: (Pt refused ), SpO2 Activity: SpO2 Activity: At Rest, SpO2 Device: O2 Device: None (Room air); Room Air    Intake/Output:   I/O       09/23 0701 - 09/24 0700 09/24 0701 - 09/25 0700 09/25 0701 - 09/26 0700    P  O  590      IV Piggyback       Total Intake(mL/kg) 590 (8 7)      Net +590                 UOP -  none recorded/24hrs    Chest tube Output:  CTs & EPWs have been discontined    Weights:   Weight (last 2 days)     Date/Time   Weight    09/25/20 0600   70 5 (155 42)            Admit weight: 79 3kg - down 9kg from admission weight    Results:   Results from last 7 days   Lab Units 09/20/20  0900 09/19/20  0511   WBC Thousand/uL 8 09  --    HEMOGLOBIN g/dL 9 0* 8 0*   HEMATOCRIT % 28 6* 25 1*   PLATELETS Thousands/uL 413*  --      Results from last 7 days   Lab Units 09/20/20  0900 09/19/20  0511   SODIUM mmol/L 139 142   POTASSIUM mmol/L 4 4 4 0   CHLORIDE mmol/L 107 107   CO2 mmol/L 28 28   BUN mg/dL 11 11   CREATININE mg/dL 0 59* 0 49*   CALCIUM mg/dL 9 0 8 8     Results from last 7 days   Lab Units 09/24/20  0438 09/23/20  1120 09/23/20  0546   INR  1 38* 1 40* 1 53*     Point of care glucose: none recorded/24hrs    Studies:  MRI bony pelvis 9/24: persistent nonspecific right sacroiliitis w/o interval change, minimal interval improvement of right iliacus & posterior lumbar paraspinal nonspecific myositis, no intramuscular abscess    I have personally reviewed pertinent reports  and I have personally reviewed pertinent films in PACS    Invasive Lines/Tubes:  Invasive Devices     Peripherally Inserted Central Catheter Line            PICC Line 08/26/20 29 days                Physical Exam:    HEENT/NECK:  PERRLA  No jugular venous distention  Cardiac: Regular rate and rhythm  Pulmonary:  Breath sounds clear bilaterally  Abdomen:  Normal bowel sounds  Incisions: Sternum is stable  Incision is clean, dry, and intact  Extremities: Extremities warm/dry  Neuro: Alert and oriented X 3  Skin: Warm/Dry, without rashes or lesions  Assessment:  Principal Problem:    Bacteremia due to Staphylococcus aureus  Active Problems:    Acute bacterial endocarditis    Septic embolism (HCC)    Bipolar 1 disorder (HCC)    Right hip pain    Intravenous drug abuse ([address] )    DVT of axillary vein, acute left (HCC)    Transaminitis    Rash    Anemia    Hyperphosphatemia    S/P TVR (tricuspid valve repair)       Tricuspid Valve Endocarditis  S/P tricuspid valve repair; POD # 15    Plan:    1  Cardiac:   NSR; HR/BP well-controlled  No beta blocker for prior hypotension  Eliquis 10mg BID x7 days then 5mg BID  Continue ASA therapy  Statin not indicated  Epicardial pacing wires out  PICC for abx  Continue DVT prophylaxis    2   Pulmonary:   Good Room air oxygen saturation; Continue incentive spirometry/Coughing/Deep breathing exercises  Chest tubes have been discontinued    3  Renal:   Intake/Output net: innacurate I/Os  Autodiuresing well    4  Neuro:  Neurologically intact; No active issues  Incisional pain well-controlled  Continue Tylenol, 975 mg PO q 8, standing dose  Continue Oxycodone, 2 5 to 5 mg PO q 4 hours prn pain   Continue Lidoderm patch                    Continue Ativan                    Continue Dilaudid                    Continue Robaxin                    APS following        Continue Melatonin        Continue Cymbalta        Continue Remeron        Continue Zyprexa    5  GI:  Tolerating TLC 2 3 gm sodium diet  Maintain 1800 mL daily fluid restriction   Continue stool softeners and prn suppository  Continue GI prophylaxis    6  Endo:   No history of diabetes; Glucose well-controlled with sliding scale coverage   Continue calcium/vitamin D supplementation    7    Hematology:    Continue iron/vitamin C supplementation   Endocarditis  Brooke Ismay                          ID following                          Ancef thorough 9/27                          MRI pending for sacroilitis    8  Disposition:      Ambulating independently, Anticipate discharge to home Sunday s/p LD abx per patient request     VTE Pharmacologic Prophylaxis: Sequential compression device (Venodyne)   VTE Mechanical Prophylaxis: sequential compression device    Collaborative rounds completed with SARAH Cooper    Plan of care discussed with bedside nurse    SIGNATURE: Ritu Veloz PA-C  DATE: September 25, 2020  TIME: 8:56 AM

## 2020-09-25 NOTE — PROGRESS NOTES
Progress Note - Infectious Disease   Lyudmila Taylor 34 y o  female MRN: [id number]  Unit/Bed#: [facility] 421-01 Encounter: [id number]      Impression/Plan:    1  Recurrent MSSA bacteremia :  Prolonged course of IV antibiotic was previously recommended, but patient has left AMA on 2 prior occasions   She remains hemodynamically stable   Repeat blood culture negative on 08/15/2020; patient is postop day 14 for tricuspid valve surgery; operative culture  negative  -continue cefazolin 2 g IV q 8 hours  through 9/27/2020 to complete 6 weeks from the first negative blood culture        2  Tricuspid valve endocarditis, 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:  MELISSA done 07/21/2020 showed large vegetation on TV with severe regurgitation  7/15 CT abdomen/pelvis also showed bilateral renal parenchymal abnormalities concerning for septic emboli, patient also had right-sided loculated pleural effusion   No intra-abdominal abscess   Repeat CT chest 09/04/2020 shows resolution of right-sided pleural effusion  Patient is status post repair of tricuspid valve with annuloplasty on 09/10/2020; sternal wound shows no dehiscence, no drainage or erythema   -antibiotic plan as above  -close CT surgery follow-up     3   Right iliacus muscle abscesses/sacroiliitis:  CT scan of right lower extremity on 08/21/2020 showed 9 mm iliacus muscle collection, decreasing in size   Follow-up imaging with MRI done 08/31/2020 with evidence of sacroiliitis that is probably septic   There are no destructive changes or fluid collection   Suspect this is the cause of the patient's ongoing pain     CRP significantly improved, though remains elevated now at 15 4  MRI of sacroiliac joint done this morning 09/24/2020 shows no intramuscular abscess;   -continue cefazolin IV through 09/27/2020, then remove PICC line before discharge;  - at discharge transition patient to cefadroxil 500 mg p o  Q 12 hours until CRP normalizes;  - patient to follow-up in ID office 2-4 weeks after discharge  Appointment entered in epic   she will need repeat CBC, creatinine, CRP prior to her appointment        4  IV drug use:   This is the causative factor for development of bacteremia and infective endocarditis   Patient has left AMA on prior occasions   HIV screen negative  -patient is not a candidate for at home PICC line/IV antibiotics  -acute pain service follow-up     5  Chronic HCV infection:   Recent HIV was negative   The LFTs are waxing waning  Review of Care everywhere shows high hepatitis C viral load 2020   - outpatient follow-up with GI     6  Left upper extremity DVT:  in the setting of PICC line use   Patient now on anticoagulation  -anticoagulation as per primary and vascular team        Discussed the above management plan with the primary service  Plan mentioned above discussed with the patient     Antibiotics:  Cefazolin restart 45  Negative blood cultures 40  Postop day 14    Subjective:  Patient has no fever, chills, sweats; no nausea, vomiting, diarrhea; no cough, shortness of breath; right buttock pain persists  No new symptoms  Objective:  Vitals:  Temp:  [97 6 °F (36 4 °C)-98 3 °F (36 8 °C)] 98 3 °F (36 8 °C)  HR:  [75-94] 78  Resp:  [18-20] 20  BP: (107-133)/(58-91) 118/58  Temp (24hrs), Av °F (36 7 °C), Min:97 6 °F (36 4 °C), Max:98 3 °F (36 8 °C)  Current: Temperature: 98 3 °F (36 8 °C)    Physical Exam:   General Appearance:  Alert, interactive, nontoxic, no acute distress  Throat: Oropharynx moist without lesions  Lungs:   Clear to auscultation bilaterally; no wheezes, rhonchi or rales; respirations unlabored   Heart:  RRR; no murmur, rub or gallop   Abdomen:   Soft, non-tender, non-distended, positive bowel sounds  Extremities: No clubbing, cyanosis or edema  Patient seen ambulating in the hallway, no limping, no signs of pain or distress   Skin: No new rashes or lesions  No draining wounds noted         Labs, Imaging, & Other studies:   All pertinent labs and imaging studies were personally reviewed  Results from last 7 days   Lab Units 09/20/20 0900 09/19/20  0511   WBC Thousand/uL 8 09  --    HEMOGLOBIN g/dL 9 0* 8 0*   PLATELETS Thousands/uL 413*  --      Results from last 7 days   Lab Units 09/21/20  0909 09/20/20 0900 09/19/20  0511   SODIUM mmol/L  --  139 142   POTASSIUM mmol/L  --  4 4 4 0   CHLORIDE mmol/L  --  107 107   CO2 mmol/L  --  28 28   BUN mg/dL  --  11 11   CREATININE mg/dL  --  0 59* 0 49*   EGFR ml/min/1 73sq m  --  126 134   CALCIUM mg/dL  --  9 0 8 8   AST U/L 15  --   --    ALT U/L 8*  --   --    ALK PHOS U/L 119*  --   --              Results from last 7 days   Lab Units 09/21/20  0909   CRP mg/L 15 4*

## 2020-09-25 NOTE — DISCHARGE INSTRUCTIONS
Sternal Precautions   WHAT YOU NEED TO KNOW:   What are sternal precautions? Sternal precautions are used to help protect your sternum (breastbone) after open chest surgery  Wires are placed during surgery to hold the sternum together as it heals  Sternal precautions help prevent the wires from cutting through the sternum  The precautions also help prevent the sternum from coming apart from an injury, and prevent pain and bleeding  You may need to use the precautions for up to 12 weeks after surgery  Your surgeon will give you specific instructions based on the type of surgery you had  It is important to follow the instructions carefully  An injury to the healing sternum can be life-threatening  What are some general sternal precautions? Start slowly and do more as you get stronger  Pain medicine might make it harder for you to know when to slow down or be careful  Stop immediately if you hear a crunch or pop in your sternum  · Protect your sternum  Hug a pillow to your chest or cross your arms over your chest when you laugh, sneeze, or cough  · Be careful when you get into or out of a chair or bed  Hug a pillow or cross your arms when you stand or sit  Do not twist as you move  Use only your legs to sit and stand  You may need to use a raised toilet seat if you have trouble standing up without using your arms  Your healthcare provider may teach you to use your elbow for support as you move from lying to sitting  · Ask when you may take a bath or shower  You may need to use a bath chair if you have trouble getting into or out of the tub  Do not use a grab bar  · Do not lift or carry anything heavier than 10 pounds  For example, a gallon of milk weighs 8 pounds  · Keep your arms down as much as possible  Do not put your arms out to the side, behind you, or over your head  Do not let anyone pull your arms to help you move or dress  Do not reach for items  · Do not push or pull anything  Examples include a car door or a vacuum   · Do not drive while you are healing  Your surgeon will tell you when it is safe for you to start driving again  How do I care for my surgery wound? Always wash your hands before you care for your wound  Wash your wound as directed  Do not rub the wound as you wash or dry the area  Pat the area dry with a clean towel  Change the bandages as directed and when they get wet or dirty  Do not smoke:  Nicotine can damage blood vessels and make it more difficult to heal  Do not use e-cigarettes or smokeless tobacco in place of cigarettes or to help you quit  They still contain nicotine  Ask your healthcare provider for information if you currently smoke and need help quitting  Call 911 for any of the following:   · You have sudden pain in your sternum and hear a crunch or pop  · You have bleeding that does not stop even after you apply pressure for 5 minutes  When should I seek immediate care? · You hear crunching or grinding in your sternum  · You have signs of an infection, such as a fever, red or warm skin, or pus in the surgery wound  When should I contact my healthcare provider? · You continue to feel pain, even after you take your pain medicine  · You have new or worsening pain, or any pain with movement  · You have questions or concerns about your condition or care  CARE AGREEMENT:   You have the right to help plan your care  Learn about your health condition and how it may be treated  Discuss treatment options with your caregivers to decide what care you want to receive  You always have the right to refuse treatment  The above information is an  only  It is not intended as medical advice for individual conditions or treatments  Talk to your doctor, nurse or pharmacist before following any medical regimen to see if it is safe and effective for you    © 2017 [employer] Information is for End User's use only and may not be sold, redistributed or otherwise used for commercial purposes  All illustrations and images included in CareNotes® are the copyrighted property of [employer]  or Antony Bullard  Correct Use of Antibiotics   WHAT YOU NEED TO KNOW:   How do healthcare providers decide if I need antibiotics? Antibiotics are used to fight infections that are caused by bacteria  Your healthcare provider will decide if your illness is caused by bacteria  This decision is based on an exam, your symptoms, or tests that check for bacteria  If your healthcare provider decides your infection is caused by bacteria, you may be given an antibiotic  Sometimes your healthcare provider may wait to prescribe antibiotics  This is called watchful waiting  Watchful waiting gives your immune system time to fight the infection without antibiotics  This can help prevent antibiotic resistance  What do I need to know about antibiotic resistance? · Many bacteria have become resistant to antibiotics  This means that the antibiotics do not work to kill the bacteria like they should  Healthcare providers call these antibiotic-resistant infections  An example of an antibiotic-resistant infection is methicillin-resistant Staphylococcus aureus (MRSA)  · Antibiotic resistance can happen when antibiotics are overused or not taken correctly  The following are examples of how antibiotics are overused or not used correctly:     ¨ You take an antibiotic for a viral infection    ¨ You take an antibiotic instead of letting your body fight the infection on its own    ¨ You do not finish your antibiotic prescription as directed    · Antibiotic resistance makes infections hard to treat  If you get an infection that is resistant to antibiotics, you may become very sick  You may spread the infection to others  You will need stronger medicine to treat the infection  Your illness may become life-threatening    What conditions are treated with antibiotics? The following conditions are often caused by bacteria and treated with antibiotics:  · Strep throat    · Whooping cough    · A urinary tract infection (UTI)  What conditions may be treated with antibiotics? Some conditions can be caused by bacteria or a virus  Your healthcare provider may or may not treat the following conditions with antibiotics:  · A sinus infection    · An ear infection    · Bronchitis    · Pneumonia  What conditions are not treated with antibiotics? Antibiotics will not treat infections caused by a virus  The following conditions are not treated with antibiotics:  · Colds    · Most coughs    · Flu    · A sore throat other than strep throat    · Respiratory syncytial virus (RSV)  What questions should I ask my healthcare provider? · Do I need an antibiotic to treat my infection? · Is watchful waiting right for me? · What is the best antibiotic to treat my infection if I do need one? · What are the side effects of the antibiotic I need to take? · How long do I need to take the antibiotic? What can I do to help prevent antibiotic resistance? · Take your antibiotic as directed  Do not skip a dose of your antibiotic  Do not stop taking your antibiotic, even if you feel better  Finish the entire dose of your antibiotic unless your healthcare provider tells you to stop  · Get rid of any unused antibiotics  Ask your healthcare provider or pharmacist how to get rid of unused antibiotics  Do not share your antibiotic with another person  Do not take a leftover antibiotic for another illness without talking to your healthcare provider  · Prevent infections caused by bacteria  This will help prevent your need for an antibiotic  Ask about vaccines that you need  Wash your hands frequently to prevent the spread of infection  · Ask your healthcare provider how to manage your symptoms without antibiotics    Your healthcare provider can recommend other treatments based on your illness  An example includes over-the-counter medicines such as acetaminophen or NSAIDs  CARE AGREEMENT:   You have the right to help plan your care  Learn about your health condition and how it may be treated  Discuss treatment options with your caregivers to decide what care you want to receive  You always have the right to refuse treatment  The above information is an  only  It is not intended as medical advice for individual conditions or treatments  Talk to your doctor, nurse or pharmacist before following any medical regimen to see if it is safe and effective for you  © 2017 [address] [facility] Information is for End User's use only and may not be sold, redistributed or otherwise used for commercial purposes  All illustrations and images included in CareNotes® are the copyrighted property of [employer]  or [employer]  Apixaban (By mouth)   Apixaban (a-PIX-a-ban)  Treats and prevents blood clots  This medicine is a blood thinner  Brand Name(s): Eliquis   There may be other brand names for this medicine  When This Medicine Should Not Be Used: This medicine is not right for everyone  Do not use it if you had an allergic reaction to apixaban or you have active bleeding  How to Use This Medicine:   Tablet  · Your doctor will tell you how much medicine to use  Do not use more than directed  · If you are not able to swallow the tablets whole, they may be crushed and mixed in water, 5% dextrose in water (D5W), apple juice, or applesauce  The crushed tablets may be mixed with 60 mL of water or D5W dose and given through a nasogastric tube (NGT)  · This medicine should come with a Medication Guide  Ask your pharmacist for a copy if you do not have one  · Missed dose: Take a dose as soon as you remember  If it is almost time for your next dose, wait until then and take a regular dose   Do not take extra medicine to make up for a missed dose   · Store the medicine in a closed container at room temperature, away from heat, moisture, and direct light  Drugs and Foods to Avoid:   Ask your doctor or pharmacist before using any other medicine, including over-the-counter medicines, vitamins, and herbal products  · Some medicines can affect how apixaban works  Tell your doctor if you are using any of the following:   ¨ Carbamazepine, clarithromycin, itraconazole, ketoconazole, phenytoin, rifampin, ritonavir, Milton's wort  ¨ Blood thinner (including clopidogrel, heparin, prasugrel, warfarin)  ¨ Medicine to treat depression  ¨ NSAID pain or arthritis medicine (including aspirin, celecoxib, diclofenac, ibuprofen, naproxen)  Warnings While Using This Medicine:   · Tell your doctor if you are pregnant or breastfeeding, or if you have kidney disease, liver disease, bleeding problems, or an artificial heart valve  · Do not stop using this medicine suddenly without asking your doctor  You might have a higher risk of stroke for a short time after you stop using this medicine  · This medicine increases your risk for bleeding that can become serious if not controlled  You may also bruise easily, and it may take longer than usual for bleeding to stop  · This medicine may increase your risk for blood clots in your spine or back if you undergo an epidural or spinal puncture  This could lead to paralysis  Tell your doctor if you ever had spine problems or back surgery  · Tell any doctor or dentist who treats you that you are using this medicine  With your doctor's supervision, you may need to stop using this medicine several days before you have surgery or medical tests  · Your doctor will do lab tests at regular visits to check on the effects of this medicine  Keep all appointments  · Keep all medicine out of the reach of children  Never share your medicine with anyone    Possible Side Effects While Using This Medicine:   Call your doctor right away if you notice any of these side effects:  · Allergic reaction: Itching or hives, swelling in your face or hands, swelling or tingling in your mouth or throat, chest tightness, trouble breathing  · Change in how much or how often you urinate, red or pink urine  · Chest pain, trouble breathing  · Coughing up blood, vomiting blood or material that looks like coffee grounds  · Numbness, tingling, or muscle weakness in your legs or feet  · Red or black, tarry stools  · Unusual bleeding, bruising, or weakness  If you notice other side effects that you think are caused by this medicine, tell your doctor  Call your doctor for medical advice about side effects  You may report side effects to FDA at 9-404-FDA-6189  © 2017 [address] [employer] Information is for End User's use only and may not be sold, redistributed or otherwise used for commercial purposes  The above information is an  only  It is not intended as medical advice for individual conditions or treatments  Talk to your doctor, nurse or pharmacist before following any medical regimen to see if it is safe and effective for you  Deep Venous Thrombosis   WHAT YOU NEED TO KNOW:   Deep venous thrombosis (DVT) is a blood clot that forms in a deep vein of the body  The deep veins in the legs, thighs, and hips are the most common sites for DVT  DVT can also occur in a deep vein within your arms  The clot prevents the normal flow of blood in the vein  The blood backs up and causes pain and swelling  The DVT can break into smaller pieces and travel to your lungs and cause a blockage called a pulmonary embolism  A pulmonary embolism can become life-threatening  DISCHARGE INSTRUCTIONS:   Call 911 for any of the following:   · You feel lightheaded, short of breath, and have chest pain  · You cough up blood  Seek care immediately if:   · Your symptoms get worse  · You develop new DVT symptoms in another leg or arm    Contact your healthcare provider if:   · Your gums or nose bleed  · You see blood in your urine or bowel movements  · Your bowel movements are black or darker than normal     · You have questions or concerns about your conditions or care  Medicines:   · Blood thinners  help prevent the DVT from getting bigger and prevent new clots from forming  Examples of blood thinners include heparin, rivaroxaban, apixiban, and warfarin  The following are general safety guidelines to follow while you are taking a blood thinner:     ¨ Watch for bleeding and bruising  Watch for bleeding from your gums or nose  Watch for blood in your urine and bowel movements  Use a soft washcloth on your skin, and a soft toothbrush to brush your teeth  This can keep your skin and gums from bleeding  If you shave, use an electric shaver  Do not play contact sports  ¨ Tell your dentist and other healthcare providers that you take a blood thinner  Wear a bracelet or necklace that says you take this medicine  ¨ Do not start or stop any medicines unless your healthcare provider tells you to  Many medicines cannot be used with blood thinners  ¨ Tell your healthcare provider right away if you forget to take the blood thinner , or if you take too much  ¨ Warfarin  is a blood thinner that you may need to take  The following are additional things you should be aware of if you take warfarin:    § Foods and medicines can affect the amount of warfarin in your blood  Do not make major changes to your diet  Warfarin works best when you eat about the same amount of vitamin K every day  Vitamin K is found in green leafy vegetables and certain other foods  Ask for more information about what to eat or not to eat  § You will need to see your healthcare provider for follow-up visits  You will need regular blood tests to decide how much warfarin you need  · Take your medicine as directed    Contact your healthcare provider if you think your medicine is not helping or if you have side effects  Tell him or her if you are allergic to any medicine  Keep a list of the medicines, vitamins, and herbs you take  Include the amounts, and when and why you take them  Bring the list or the pill bottles to follow-up visits  Carry your medicine list with you in case of an emergency  Manage your DVT:   · Wear pressure stockings  The stockings are tight and put pressure on your legs  This improves blood flow and helps prevent clots  Wear the stockings during the day  Do not wear them when you sleep  · Elevate your legs  above the level of your heart as often as you can  This will help decrease swelling and pain  Prop your legs on pillows or blankets to keep them elevated comfortably  · Exercise regularly  to help increase your blood flow  Walking is a good low-impact exercise  Talk to your healthcare provider about the best exercise plan for you  · Change body positions often  If you travel by car or work at a desk, move and stretch in your seat several times each hour  In an airplane, get up and walk every hour  If you are bedridden, ask for help to change your position every 1 to 2 hours  · Maintain a healthy weight  Ask your healthcare provider how much you should weigh  Ask him to help you create a weight loss plan if you are overweight  · Do not smoke  Nicotine and other chemicals in cigarettes and cigars can damage blood vessels and make it more difficult to manage your DVT  Ask your healthcare provider for information if you currently smoke and need help to quit  E-cigarettes or smokeless tobacco still contain nicotine  Talk to your healthcare provider before you use these products  Follow up with your healthcare provider as directed:  Write down your questions so you remember to ask them during your visits     © 2017 [employer] Information is for End User's use only and may not be sold, redistributed or otherwise used for commercial purposes  All illustrations and images included in CareNotes® are the copyrighted property of [employer]  or Antony Bullard  The above information is an  only  It is not intended as medical advice for individual conditions or treatments  Talk to your doctor, nurse or pharmacist before following any medical regimen to see if it is safe and effective for you  Novel Coronavirus   WHAT YOU NEED TO KNOW:   What is the novel coronavirus (nCoV)? The nCoV is a new strain (type) of coronavirus  Coronaviruses often cause mild respiratory (lung) infections, such as the common cold  They can also cause more severe infections  Examples include acute respiratory syndrome (SARS), [place] respiratory syndrome (MERS), pneumonia, and bronchitis  The nCoV can cause more severe symptoms than earlier coronaviruses  The nCoV was first found in individuals in part [facility] at the end of 2019  The virus is spreading as people who are infected travel to other parts of the world  What are the signs and symptoms of nCoV? Signs and symptoms can take 2 to 14 days to develop and range from mild to severe:  · Fever    · Cough    · Shortness of breath or trouble breathing    · Pneumonia (in severe cases)    How does nCoV spread? It is not known for sure how the virus spreads  The virus may spread in droplets when an infected person coughs or sneezes  Others become infected by breathing in the virus or getting the virus in their eyes  The virus can also possibly spread if a person touches certain animals, such as in a live market  How is nCoV diagnosed? If you develop symptoms of nCoV, you may need to be checked  Call your doctor if you traveled within the past 14 days to an area where nCoV is active  Call your doctor if you have close contact with someone else who did  Your doctor will tell you what to do  He or she will need to take precautions in the office to protect staff members and other patients  Your doctor will take samples from your airway  The samples have to be sent to the Centers for Disease Control and Prevention (CDC) to be tested  What should I do if I have nCoV? No treatment is available for nCoV  Medicine may be given to help relieve your cough or fever, or to help you breathe more easily  Severe nCoV may need to be treated in the hospital  In the hospital, you may need breathing treatment or support, such as extra oxygen  The following can help you prevent spreading nCoV to others  Have anyone you are caring for who has nCoV also use the guidelines  · Limit close contact with others  Stay in a different room, or sleep in a separate bed  Remind others to stay at least 6 feet (2 meters) away from you while you are sick  Only go out of your house if you are going to a medical appointment  While you are recovering, always call the office of any healthcare provider you seeing  The provider will need to prepare for your arrival to keep others safe  · Wear a medical mask when you are around others  A medical mask will help prevent you from spreading the virus  You can ask others around you to wear a medical mask if you are not able to wear one  · Cover your mouth and nose to sneeze or cough  Sneeze and cough into a tissue that covers your mouth and nose  Use the bend of our arm if you do not have a tissue  Throw the tissue away in a trash can right away  Then wash your hands well with soap and water  Use hand  that contains alcohol if you cannot wash your hands  · Wash your hands often  You and everyone in your home must wash your hands throughout the day  Use soap and water  Use germ-killing gel if soap and water are not available  Do not touch your eyes or mouth if you have not washed your hands  · Do not share items with other people  Do not share dishes, towels, or other items with anyone  Always wash items after you use them      · Clean frequently touched surfaces often   Use household  or bleach diluted with water to clean counters, doorknobs, toilet seats, and other surfaces  · Do not handle live animals  You may be able to spread nCoV to animals, including pets  Until more is known, it is best not to touch, play with, or handle live animals while you are sick  What can I do to relieve my symptoms? The following may help if you have mild symptoms:  · Drink more liquids as directed  Liquids will help thin and loosen mucus so you can cough it up  Liquids will also help prevent dehydration  Liquids that help prevent dehydration include water, fruit juice, and broth  Do not drink liquids that contain caffeine  Caffeine can increase your risk for dehydration  Ask your healthcare provider how much liquid to drink each day  · Soothe a sore throat  Gargle with warm salt water  This helps your sore throat feel better  Make salt water by dissolving ¼ teaspoon salt in 1 cup warm water  You may also suck on hard candy or throat lozenges  You may use a sore throat spray  · Use a humidifier or vaporizer  Use a cool mist humidifier or a vaporizer to increase air moisture in your home  This may make it easier for you to breathe and help decrease your cough  · Use saline nasal drops as directed  These help relieve congestion  · Apply petroleum-based jelly around the outside of your nostrils  This can decrease irritation from blowing your nose  · Do not smoke  Nicotine and other chemicals in cigarettes and cigars can make your symptoms worse  They can also cause infections such as bronchitis or pneumonia  Ask your healthcare provider for information if you currently smoke and need help to quit  E-cigarettes or smokeless tobacco still contain nicotine  Talk to your healthcare provider before you use these products  Call your local emergency number [phone] in the [address]) for any of the following:  Tell the  you may have nCoV   This will help anyone in the ambulance stay safe, and to call ahead to the hospital   · You have sudden shortness of breath  · You have a fast heartbeat or chest pain  When should I seek immediate care? · You feel so dizzy that you have trouble standing up  · Your lips and fingernails turn blue  When should I call my doctor? · You have a fever over 102ºF (39ºC)  · Your sore throat gets worse or you see white or yellow spots in your throat  · Your symptoms get worse after 3 to 5 days or your cold is not better in 14 days  · You have a rash anywhere on your skin  · You have large, tender lumps in your neck  · You have thick, green, or yellow drainage from your nose  · You cough up thick yellow, green, or bloody mucus  · You are vomiting for more than 24 hours and cannot keep fluids down  · You have a bad earache  · You have questions or concerns about your condition or care  CARE AGREEMENT:   You have the right to help plan your care  Learn about your health condition and how it may be treated  Discuss treatment options with your healthcare providers to decide what care you want to receive  You always have the right to refuse treatment  The above information is an  only  It is not intended as medical advice for individual conditions or treatments  Talk to your doctor, nurse or pharmacist before following any medical regimen to see if it is safe and effective for you  © Copyright [address] Information is for End User's use only and may not be sold, redistributed or otherwise used for commercial purposes   All illustrations and images included in CareNotes® are the copyrighted property of A D A M , Inc  or [address] [place], [place] [address]pe

## 2020-09-25 NOTE — CASE MANAGEMENT
Per Cardiothoracic Surgery MILLIE Mattson, pt is tentative d/c for Sunday 9/27/20 once pt completes her regime of IV ABX  Pt has no aftercare needs to be arranged by Case Mgt  Pt has stated she will require assistance w/ transport home  P4 floor RN staff have been informed and educated on how to arrange a LYFT rideshare w/ [facility] by calling them via the tie-line system at [phone] and speaking to a dispatcher  CM to follow

## 2020-09-25 NOTE — PROGRESS NOTES
Progress Note - Acute Pain Service    Jaqui Done 34 y o  female MRN: [id number]  Unit/Bed#: [facility] 421-01 Encounter: [id number]      Assessment:   Principal Problem:    Bacteremia due to Staphylococcus aureus  Active Problems:    Acute bacterial endocarditis    Septic embolism (HCC)    Bipolar 1 disorder ([facility] [address] )    Right hip pain    [facility] ([facility]    DVT of axillary vein, acute left (HCC)    Transaminitis    Rash    Anemia    Hyperphosphatemia    S/P TVR (tricuspid valve repair)    Jaqui Done is a 34 y o  female  with tricuspid valve endocarditis status post TV are, history opioid use disorder    Plan:    Continue acetaminophen 975 mg p o  q 8 hours p r n  mild pain   Discontinue IV Dilaudid today   Continue ibuprofen 800 mg p o  q 8 hours scheduled   Continue oxycodone 10 mg p o  q 4 hours p r n  severe pain   Continue duloxetine 60 mg p o  daily scheduled   Suggest change lorazepam to 0 5 mg IV q  6 hours p r n  anxiety   Continue Zyprexa 5 mg p o  hs scheduled   Continue methocarbamol 750 mg p o  q 8 hours scheduled   Continue lidocaine patch, 2 patches for 12 hours daily   Continue bowel regimen to avoid opioid induced constipation  · On Saturday, decrease oxycodone to 5 mg p o  q 4 hours p r n  severe pain  · At discharge, would continue acetaminophen p r n , ibuprofen 800 mg p o  q 8 hours scheduled, duloxetine 60 mg p o  daily scheduled, Zyprexa 5 mg p o  HS scheduled, methocarbamol 750 mg p o  q 8 hours scheduled, lidocaine patch x2 for 12 hours daily, bowel regimen  · Have had multiple discussions with patient regarding abstinence from drug use and possible resources available  Patient given a list of local NA meetings and suggested patient go to at least 1 meeting a day  Patient also encouraged to seek drug rehabilitation either as inpatient or outpatient  Patient appears motivated    I would be comfortable with patient being discharged with oxycodone 5 mg x 5 tablets with instructions to take only as needed for severe pain q 4 hours  Patient is to give these to her mother to avoid temptation to overuse  Patient is to dispose of these if they are not used within several days  APS will sign off at this time  Thank you for the consult  All opioids and other analgesics to be written at discretion of primary team  Please call  / [phone] or [employer] Acute Pain Service - B (/ between [phone] and on weekends) with questions or concerns    Pain History  Current pain location(s):  Right hip  Pain Scale:   6/10  Quality:  Sore  24 hour history:  Patient's pain continues to improve  Recent MRI shows persistent sacroiliitis with no evidence of infection which is consistent with her discomfort  Patient is looking forward to discharge home  Opioid requirement previous 24 hours:  Oxycodone 40 mg p o , hydromorphone 4 mg IV       Meds/Allergies   all current active meds have been reviewed, current meds:   Current Facility-Administered Medications   Medication Dose Route Frequency    acetaminophen (TYLENOL) tablet 975 mg  975 mg Oral Q8H PRN    apixaban (ELIQUIS) tablet 10 mg  10 mg Oral BID    ascorbic acid (VITAMIN C) tablet 500 mg  500 mg Oral Daily    aspirin chewable tablet 81 mg  81 mg Oral Daily    bisacodyl (DULCOLAX) rectal suppository 10 mg  10 mg Rectal Daily PRN    calcium acetate (PHOSLO) capsule 667 mg  667 mg Oral TID With Meals    calcium carbonate (TUMS) chewable tablet 1,000 mg  1,000 mg Oral Daily PRN    ceFAZolin (ANCEF) IVPB (premix) 2,000 mg 50 mL  2,000 mg Intravenous Q8H    cholecalciferol (VITAMIN D3) tablet 1,000 Units  1,000 Units Oral Daily    dicyclomine (BENTYL) capsule 10 mg  10 mg Oral TID PRN    docusate sodium (COLACE) capsule 100 mg  100 mg Oral BID    DULoxetine (CYMBALTA) delayed release capsule 60 mg  60 mg Oral Daily    ergocalciferol (VITAMIN D2) capsule 50,000 Units  50,000 Units Oral Once per day on Mon Wed Fri    ferrous sulfate tablet 325 mg  325 mg Oral Daily With Breakfast    HYDROmorphone (DILAUDID) injection 1 mg  1 mg Intravenous Q6H PRN    ibuprofen (MOTRIN) tablet 800 mg  800 mg Oral Q8H [address]    iohexol (OMNIPAQUE) 240 MG/ML solution 50 mL  50 mL Oral 90 min pre-procedure    lidocaine (LIDODERM) 5 % patch 2 patch  2 patch Topical Daily    LORazepam (ATIVAN) injection 0 5 mg  0 5 mg Intravenous Q4H PRN    LORazepam (ATIVAN) injection 3 mg  3 mg Intravenous Once    magnesium oxide (MAG-OX) tablet 400 mg  400 mg Oral BID    melatonin tablet 6 mg  6 mg Oral HS    methocarbamol (ROBAXIN) tablet 750 mg  750 mg Oral Q8H [address]    miconazole 2 % cream   Topical BID    mirtazapine (REMERON) tablet 30 mg  30 mg Oral HS    OLANZapine (ZyPREXA) tablet 5 mg  5 mg Oral HS    ondansetron (ZOFRAN) injection 4 mg  4 mg Intravenous Q6H PRN    oxyCODONE (ROXICODONE) immediate release tablet 10 mg  10 mg Oral Q4H PRN    pantoprazole (PROTONIX) EC tablet 40 mg  40 mg Oral Daily    polyethylene glycol (MIRALAX) packet 17 g  17 g Oral Daily    saccharomyces boulardii (FLORASTOR) capsule 250 mg  250 mg Oral BID    senna (SENOKOT) tablet 8 6 mg  1 tablet Oral BID    and PTA meds:   None       Allergies   Allergen Reactions    Cat Hair Extract     Dog Epithelium     Latex     Pollen Extract        Objective     Temp:  [97 6 °F (36 4 °C)-98 3 °F (36 8 °C)] 98 3 °F (36 8 °C)  HR:  [] 78  Resp:  [18-20] 20  BP: (107-133)/(58-91) 118/58    Physical Exam  Vitals signs and nursing note reviewed  Constitutional:       General: She is awake  She is not in acute distress  Eyes:      Pupils: Pupils are equal, round, and reactive to light  Skin:     General: Skin is warm and dry  Neurological:      General: No focal deficit present  Mental Status: She is alert and oriented to person, place, and time  GCS: GCS eye subscore is 4  GCS verbal subscore is 5  GCS motor subscore is 6  Psychiatric:         Behavior: Behavior is cooperative  Lab Results:   Results from last 7 days   Lab Units 09/20/20  0900   WBC Thousand/uL 8 09   HEMOGLOBIN g/dL 9 0*   HEMATOCRIT % 28 6*   PLATELETS Thousands/uL 413*      Results from last 7 days   Lab Units 09/21/20  0909 09/20/20  0900   POTASSIUM mmol/L  --  4 4   CHLORIDE mmol/L  --  107   CO2 mmol/L  --  28   BUN mg/dL  --  11   CREATININE mg/dL  --  0 59*   CALCIUM mg/dL  --  9 0   ALK PHOS U/L 119*  --    ALT U/L 8*  --    AST U/L 15  --        Imaging Studies: I have personally reviewed pertinent reports  EKG, Pathology, and Other Studies: I have personally reviewed pertinent reports  Counseling / Coordination of Care  Total floor / unit time spent today 30 minutes  Greater than 50% of total time was spent with the patient and / or family counseling and / or coordination of care  A description of the counseling / coordination of care:  Patient interview, physical examination, review of medical record, review of imaging and laboratory data, development of pain management plan, discussion of pain management plan with patient and primary service  Please note that the APS provides consultative services regarding pain management only  With the exception of ketamine and epidural infusions and except when indicated, final decisions regarding starting or changing doses of analgesic medications are at the discretion of the consulting service  Off hours consultation and/or medication management is generally not available      Frank MILLIE Juarez  Acute Pain Service

## 2020-09-26 PROCEDURE — 99024 POSTOP FOLLOW-UP VISIT: CPT | Performed by: THORACIC SURGERY (CARDIOTHORACIC VASCULAR SURGERY)

## 2020-09-26 RX ORDER — CEFAZOLIN SODIUM 2 G/50ML
2000 SOLUTION INTRAVENOUS EVERY 8 HOURS
Status: DISCONTINUED | OUTPATIENT
Start: 2020-09-26 | End: 2020-09-28 | Stop reason: HOSPADM

## 2020-09-26 RX ORDER — LORAZEPAM 2 MG/ML
0.5 INJECTION INTRAMUSCULAR EVERY 6 HOURS PRN
Status: DISCONTINUED | OUTPATIENT
Start: 2020-09-26 | End: 2020-09-28 | Stop reason: HOSPADM

## 2020-09-26 RX ORDER — OXYCODONE HYDROCHLORIDE 5 MG/1
5 TABLET ORAL EVERY 4 HOURS PRN
Status: DISCONTINUED | OUTPATIENT
Start: 2020-09-26 | End: 2020-09-28 | Stop reason: HOSPADM

## 2020-09-26 RX ADMIN — IBUPROFEN 800 MG: 400 TABLET ORAL at 21:17

## 2020-09-26 RX ADMIN — LORAZEPAM 0.5 MG: 2 INJECTION INTRAMUSCULAR; INTRAVENOUS at 06:30

## 2020-09-26 RX ADMIN — PANTOPRAZOLE SODIUM 40 MG: 40 TABLET, DELAYED RELEASE ORAL at 08:34

## 2020-09-26 RX ADMIN — OLANZAPINE 5 MG: 5 TABLET, FILM COATED ORAL at 20:11

## 2020-09-26 RX ADMIN — MAGNESIUM OXIDE TAB 400 MG (241.3 MG ELEMENTAL MG) 400 MG: 400 (241.3 MG) TAB at 17:01

## 2020-09-26 RX ADMIN — APIXABAN 10 MG: 5 TABLET, FILM COATED ORAL at 08:34

## 2020-09-26 RX ADMIN — OXYCODONE HYDROCHLORIDE AND ACETAMINOPHEN 500 MG: 500 TABLET ORAL at 08:34

## 2020-09-26 RX ADMIN — MIRTAZAPINE 30 MG: 30 TABLET, FILM COATED ORAL at 20:11

## 2020-09-26 RX ADMIN — CEFAZOLIN SODIUM 2000 MG: 2 SOLUTION INTRAVENOUS at 21:18

## 2020-09-26 RX ADMIN — OXYCODONE HYDROCHLORIDE 5 MG: 5 TABLET ORAL at 11:08

## 2020-09-26 RX ADMIN — CEFAZOLIN SODIUM 2000 MG: 2 SOLUTION INTRAVENOUS at 08:34

## 2020-09-26 RX ADMIN — Medication 250 MG: at 17:02

## 2020-09-26 RX ADMIN — DULOXETINE HYDROCHLORIDE 60 MG: 60 CAPSULE, DELAYED RELEASE ORAL at 08:39

## 2020-09-26 RX ADMIN — CALCIUM ACETATE 667 MG: 667 CAPSULE ORAL at 17:01

## 2020-09-26 RX ADMIN — CEFAZOLIN SODIUM 2000 MG: 2 SOLUTION INTRAVENOUS at 17:04

## 2020-09-26 RX ADMIN — LORAZEPAM 0.5 MG: 2 INJECTION INTRAMUSCULAR; INTRAVENOUS at 14:41

## 2020-09-26 RX ADMIN — METHOCARBAMOL TABLETS 750 MG: 750 TABLET, COATED ORAL at 17:01

## 2020-09-26 RX ADMIN — FERROUS SULFATE TAB 325 MG (65 MG ELEMENTAL FE) 325 MG: 325 (65 FE) TAB at 08:34

## 2020-09-26 RX ADMIN — SENNOSIDES 8.6 MG: 8.6 TABLET ORAL at 08:34

## 2020-09-26 RX ADMIN — MELATONIN 6 MG: at 20:11

## 2020-09-26 RX ADMIN — METHOCARBAMOL TABLETS 750 MG: 750 TABLET, COATED ORAL at 08:34

## 2020-09-26 RX ADMIN — SENNOSIDES 8.6 MG: 8.6 TABLET ORAL at 17:02

## 2020-09-26 RX ADMIN — Medication 250 MG: at 08:34

## 2020-09-26 RX ADMIN — ASPIRIN 81 MG CHEWABLE TABLET 81 MG: 81 TABLET CHEWABLE at 08:34

## 2020-09-26 RX ADMIN — HYDROMORPHONE HYDROCHLORIDE 1 MG: 1 INJECTION, SOLUTION INTRAMUSCULAR; INTRAVENOUS; SUBCUTANEOUS at 06:08

## 2020-09-26 RX ADMIN — Medication 12.5 MG: at 08:34

## 2020-09-26 RX ADMIN — IBUPROFEN 800 MG: 400 TABLET ORAL at 13:57

## 2020-09-26 RX ADMIN — APIXABAN 10 MG: 5 TABLET, FILM COATED ORAL at 17:01

## 2020-09-26 RX ADMIN — CALCIUM ACETATE 667 MG: 667 CAPSULE ORAL at 08:34

## 2020-09-26 RX ADMIN — Medication 1000 UNITS: at 08:34

## 2020-09-26 RX ADMIN — MAGNESIUM OXIDE TAB 400 MG (241.3 MG ELEMENTAL MG) 400 MG: 400 (241.3 MG) TAB at 08:34

## 2020-09-26 RX ADMIN — LORAZEPAM 0.5 MG: 2 INJECTION INTRAMUSCULAR; INTRAVENOUS at 21:17

## 2020-09-26 RX ADMIN — Medication 12.5 MG: at 20:11

## 2020-09-26 RX ADMIN — CALCIUM ACETATE 667 MG: 667 CAPSULE ORAL at 11:37

## 2020-09-27 VITALS
HEART RATE: 55 BPM | WEIGHT: 156.75 LBS | TEMPERATURE: 98 F | HEIGHT: 65 IN | BODY MASS INDEX: 26.12 KG/M2 | SYSTOLIC BLOOD PRESSURE: 107 MMHG | OXYGEN SATURATION: 99 % | RESPIRATION RATE: 16 BRPM | DIASTOLIC BLOOD PRESSURE: 59 MMHG

## 2020-09-27 PROCEDURE — 99024 POSTOP FOLLOW-UP VISIT: CPT | Performed by: THORACIC SURGERY (CARDIOTHORACIC VASCULAR SURGERY)

## 2020-09-27 PROCEDURE — 99024 POSTOP FOLLOW-UP VISIT: CPT | Performed by: PHYSICIAN ASSISTANT

## 2020-09-27 RX ORDER — CEFADROXIL 500 MG/1
500 CAPSULE ORAL EVERY 12 HOURS SCHEDULED
Qty: 60 CAPSULE | Refills: 0 | Status: SHIPPED | OUTPATIENT
Start: 2020-09-28 | End: 2020-10-26 | Stop reason: HOSPADM

## 2020-09-27 RX ORDER — FERROUS SULFATE 325(65) MG
325 TABLET ORAL
Qty: 90 TABLET | Refills: 0 | Status: SHIPPED | OUTPATIENT
Start: 2020-09-27 | End: 2021-03-16 | Stop reason: HOSPADM

## 2020-09-27 RX ORDER — LIDOCAINE 50 MG/G
2 PATCH TOPICAL DAILY
Qty: 28 PATCH | Refills: 0 | Status: SHIPPED | OUTPATIENT
Start: 2020-09-28 | End: 2021-03-16 | Stop reason: HOSPADM

## 2020-09-27 RX ORDER — ASCORBIC ACID 500 MG
500 TABLET ORAL DAILY
Qty: 90 TABLET | Refills: 0 | Status: SHIPPED | OUTPATIENT
Start: 2020-09-27 | End: 2021-03-16 | Stop reason: HOSPADM

## 2020-09-27 RX ORDER — POLYETHYLENE GLYCOL 3350 17 G/17G
17 POWDER, FOR SOLUTION ORAL DAILY
Qty: 30 EACH | Refills: 0 | Status: SHIPPED | OUTPATIENT
Start: 2020-09-27 | End: 2020-10-26 | Stop reason: HOSPADM

## 2020-09-27 RX ORDER — ASPIRIN 81 MG/1
81 TABLET, CHEWABLE ORAL DAILY
Qty: 30 TABLET | Refills: 2 | Status: SHIPPED | OUTPATIENT
Start: 2020-09-27 | End: 2020-10-26 | Stop reason: HOSPADM

## 2020-09-27 RX ORDER — PANTOPRAZOLE SODIUM 40 MG/1
40 TABLET, DELAYED RELEASE ORAL DAILY
Qty: 30 TABLET | Refills: 0 | Status: SHIPPED | OUTPATIENT
Start: 2020-09-27 | End: 2021-03-16 | Stop reason: HOSPADM

## 2020-09-27 RX ORDER — DULOXETIN HYDROCHLORIDE 60 MG/1
60 CAPSULE, DELAYED RELEASE ORAL DAILY
Qty: 30 CAPSULE | Refills: 0 | Status: SHIPPED | OUTPATIENT
Start: 2020-09-27 | End: 2021-03-16 | Stop reason: HOSPADM

## 2020-09-27 RX ORDER — METHOCARBAMOL 750 MG/1
750 TABLET, FILM COATED ORAL EVERY 8 HOURS SCHEDULED
Qty: 42 TABLET | Refills: 0 | Status: SHIPPED | OUTPATIENT
Start: 2020-09-27 | End: 2020-10-26 | Stop reason: HOSPADM

## 2020-09-27 RX ORDER — OXYCODONE HYDROCHLORIDE 5 MG/1
5 TABLET ORAL EVERY 4 HOURS PRN
Qty: 5 TABLET | Refills: 0 | Status: SHIPPED | OUTPATIENT
Start: 2020-09-27 | End: 2020-10-26 | Stop reason: HOSPADM

## 2020-09-27 RX ORDER — IBUPROFEN 800 MG/1
800 TABLET ORAL EVERY 8 HOURS SCHEDULED
Qty: 42 TABLET | Refills: 0 | Status: SHIPPED | OUTPATIENT
Start: 2020-09-27 | End: 2021-03-16 | Stop reason: HOSPADM

## 2020-09-27 RX ORDER — OLANZAPINE 5 MG/1
5 TABLET ORAL
Qty: 30 TABLET | Refills: 0 | Status: SHIPPED | OUTPATIENT
Start: 2020-09-27 | End: 2021-03-16 | Stop reason: HOSPADM

## 2020-09-27 RX ORDER — MIRTAZAPINE 30 MG/1
30 TABLET, FILM COATED ORAL
Qty: 30 TABLET | Refills: 0 | Status: SHIPPED | OUTPATIENT
Start: 2020-09-27 | End: 2021-03-16 | Stop reason: HOSPADM

## 2020-09-27 RX ORDER — ACETAMINOPHEN 325 MG/1
975 TABLET ORAL EVERY 8 HOURS PRN
Qty: 126 TABLET | Refills: 0 | Status: SHIPPED | OUTPATIENT
Start: 2020-09-27 | End: 2020-10-11

## 2020-09-27 RX ORDER — DOCUSATE SODIUM 100 MG/1
100 CAPSULE, LIQUID FILLED ORAL 2 TIMES DAILY
Qty: 60 CAPSULE | Refills: 0 | Status: SHIPPED | OUTPATIENT
Start: 2020-09-27 | End: 2021-03-16 | Stop reason: HOSPADM

## 2020-09-27 RX ORDER — SENNOSIDES 8.6 MG
8.6 TABLET ORAL 2 TIMES DAILY
Qty: 60 EACH | Refills: 0 | Status: SHIPPED | OUTPATIENT
Start: 2020-09-27 | End: 2021-03-16 | Stop reason: HOSPADM

## 2020-09-27 RX ORDER — MELATONIN
1000 DAILY
Qty: 30 TABLET | Refills: 0 | Status: SHIPPED | OUTPATIENT
Start: 2020-09-27 | End: 2021-03-16 | Stop reason: HOSPADM

## 2020-09-27 RX ORDER — SACCHAROMYCES BOULARDII 250 MG
250 CAPSULE ORAL 2 TIMES DAILY
Qty: 60 CAPSULE | Refills: 0 | Status: SHIPPED | OUTPATIENT
Start: 2020-09-27 | End: 2020-10-27

## 2020-09-27 RX ADMIN — Medication 12.5 MG: at 08:57

## 2020-09-27 RX ADMIN — IBUPROFEN 800 MG: 400 TABLET ORAL at 13:32

## 2020-09-27 RX ADMIN — IBUPROFEN 800 MG: 400 TABLET ORAL at 21:02

## 2020-09-27 RX ADMIN — SENNOSIDES 8.6 MG: 8.6 TABLET ORAL at 17:17

## 2020-09-27 RX ADMIN — CALCIUM ACETATE 667 MG: 667 CAPSULE ORAL at 17:17

## 2020-09-27 RX ADMIN — MELATONIN 6 MG: at 21:02

## 2020-09-27 RX ADMIN — CALCIUM ACETATE 667 MG: 667 CAPSULE ORAL at 08:57

## 2020-09-27 RX ADMIN — OLANZAPINE 5 MG: 5 TABLET, FILM COATED ORAL at 21:04

## 2020-09-27 RX ADMIN — OXYCODONE HYDROCHLORIDE AND ACETAMINOPHEN 500 MG: 500 TABLET ORAL at 08:57

## 2020-09-27 RX ADMIN — FERROUS SULFATE TAB 325 MG (65 MG ELEMENTAL FE) 325 MG: 325 (65 FE) TAB at 08:57

## 2020-09-27 RX ADMIN — Medication 250 MG: at 08:57

## 2020-09-27 RX ADMIN — CEFAZOLIN SODIUM 2000 MG: 2 SOLUTION INTRAVENOUS at 05:18

## 2020-09-27 RX ADMIN — IBUPROFEN 800 MG: 400 TABLET ORAL at 05:18

## 2020-09-27 RX ADMIN — PANTOPRAZOLE SODIUM 40 MG: 40 TABLET, DELAYED RELEASE ORAL at 08:57

## 2020-09-27 RX ADMIN — MIRTAZAPINE 30 MG: 30 TABLET, FILM COATED ORAL at 21:03

## 2020-09-27 RX ADMIN — CEFAZOLIN SODIUM 2000 MG: 2 SOLUTION INTRAVENOUS at 21:04

## 2020-09-27 RX ADMIN — SENNOSIDES 8.6 MG: 8.6 TABLET ORAL at 08:57

## 2020-09-27 RX ADMIN — DULOXETINE HYDROCHLORIDE 60 MG: 60 CAPSULE, DELAYED RELEASE ORAL at 08:57

## 2020-09-27 RX ADMIN — OXYCODONE HYDROCHLORIDE 5 MG: 5 TABLET ORAL at 16:46

## 2020-09-27 RX ADMIN — ASPIRIN 81 MG CHEWABLE TABLET 81 MG: 81 TABLET CHEWABLE at 08:57

## 2020-09-27 RX ADMIN — Medication 12.5 MG: at 21:04

## 2020-09-27 RX ADMIN — OXYCODONE HYDROCHLORIDE 5 MG: 5 TABLET ORAL at 11:49

## 2020-09-27 RX ADMIN — CALCIUM ACETATE 667 MG: 667 CAPSULE ORAL at 11:49

## 2020-09-27 RX ADMIN — LORAZEPAM 0.5 MG: 2 INJECTION INTRAMUSCULAR; INTRAVENOUS at 15:24

## 2020-09-27 RX ADMIN — APIXABAN 10 MG: 5 TABLET, FILM COATED ORAL at 17:17

## 2020-09-27 RX ADMIN — Medication 1000 UNITS: at 08:57

## 2020-09-27 RX ADMIN — MAGNESIUM OXIDE TAB 400 MG (241.3 MG ELEMENTAL MG) 400 MG: 400 (241.3 MG) TAB at 17:17

## 2020-09-27 RX ADMIN — METHOCARBAMOL TABLETS 750 MG: 750 TABLET, COATED ORAL at 08:57

## 2020-09-27 RX ADMIN — Medication 250 MG: at 17:17

## 2020-09-27 RX ADMIN — CEFAZOLIN SODIUM 2000 MG: 2 SOLUTION INTRAVENOUS at 13:32

## 2020-09-27 RX ADMIN — APIXABAN 10 MG: 5 TABLET, FILM COATED ORAL at 08:57

## 2020-09-27 RX ADMIN — LORAZEPAM 0.5 MG: 2 INJECTION INTRAMUSCULAR; INTRAVENOUS at 09:04

## 2020-09-27 RX ADMIN — MAGNESIUM OXIDE TAB 400 MG (241.3 MG ELEMENTAL MG) 400 MG: 400 (241.3 MG) TAB at 08:57

## 2020-09-27 NOTE — DISCHARGE SUMMARY
Discharge Summary - Cardiothoracic Surgery   Abdirashid Romero 34 y o  female MRN: [id number]  Unit/Bed#: [facility] 421-01 Encounter: [id number]    Admission Date: 8/12/2020     Discharge Date: 09/27/20    Admitting Diagnosis: Endocarditis [I38]    Primary Discharge Diagnosis:   Tricuspid Valve Endocarditis  S/P tricuspid valve repair;    Secondary Discharge Diagnosis:   1  Staph aureus bacteremia  2  Acute tricuspid endocarditis  3  H/o  IV drug abuse  4  Septic pulmonary emboli  5  Bipolar 1 disorder  6  Chronic hep C    Attending: SARAH Contreras  Consulting Physician(s):   Cardiology  Medical/Critical Care  Endocrinology  Nephrology  Oral & Maxillary Facial Surgery  Behavioral Health  Vascular Surgery  OB/GYN  Infectious Disease  Acute Pain Services    Procedures Performed:   Procedure(s):  9/10/2020: REPAIR VALVE TRICUSPID with Medtronic 30mm 3D Contour  Annuloplasty Ring  TRANSESOPHAGEAL ECHOCARDIOGRAM (MELISSA)     9/6/2020: [address] [place] [address] DataRose Course:   9/3: Reconsulted for TVR  Repeat echo shows severe TR with moderate sized vegetation  CT shows pulmonary septic emboli  Blood cultures from 8/26 are negative  She is ready to discuss surgical options  Will be seen by Dr Debra Styles tomorrow to determine surgical candidacy  9/4: CT chest noncontrast ordered  BC negative since 8/13  OMFS consulted  9/5:  OMFS consult completed; Plan for dental extractions tomorrow  Carotid U/S completed and acceptable  9/6: Dental extraction today  9/7: D/C Lovenox after tomorrow night's dose; Done  No events  C/O hip pain  9/8: patient not cooperating with staff  Refused to be examined  9/9: Consent signed and on chart  Moved to P4  Preop orders placed  9/10: Tricuspid valve repair (#30mm Medtronic 3D Contour Ring)  Transferred to ICU supported without inotropic/pressor support  No significant postoperative bleeding  Wean towards extubation       9/11: Extubated to [facility], wean as tolerated  APS following  Weaned off nellie  Delined  Hb down to 8 3 from 9 1 from 10 8, continue to monitor  Resolved biapical PTX, no airleak on exam  Start Lopressor 12 5mg BID  Start Lasix 40mg IV QDay, discontinue yuen catheter  Discontinue insulin gtt, transition to [facility]  Afebrile, OR sx pending, WBC WNL  Transfer to telem  9/12: On room air  ST, HR 110s  Incr     9/ease Lopressor to 25 BID  Pain controlled on current regimen  Resume Zyprexa and Remeron qhs  INR 1 06, dose Coumadin tonight  Net 1 6L, start Lasix 20 QD  OR cultures negative  Discontinue chest tubes and pacing wires  9/13: HR 90s-110s on Lopressor 25 BID  Straight cathed overnight but voiding this morning  Hg 7 4, start iron and vitamin c  INR 1 16, dose Coumadin 5 mg tonight  Replete K of 3 5  Net 700cc/24hrs  Discontinue TLC      9/14: no acute issues  Coumadin 5mg tonight  Refused PE as she just got back into bed  PM: APS increased anxiety meds w/ intention to decrease Dilaudid starting tomorrow     9/15: Dilaudid decreased per APS to 2 mg q4h  INR 2 71  coumadin 1 mg ordered  9/16: INR 2 34, dose Coumadin 3 mg tonight  SBP 80s-90s overnight, recovered this morning  Discontinue ketamine today  Hg 6 9  Repeat H&H in afternoon 8 1      9/17: Hg 7 8  INR 1 91 - Coumadin 2mg tonight  Hypotensive episode last evening (SBP 80), improved, but BP soft  Decrease Metoprolol to 12 5mg q12  Vit D level 7 1 - start Vit D2 50,000 units 3 times per week  9/18: SBP 80s overnight and this morning  Discontinue beta blocker and amiodarone  Blood sugars have been well controlled, discontinue SSIC  High UOP per patient, discontinue diuretic and increase fluid restriction to 2L  INR 1 9, dose Coumadin 3 mg  C/o pain at site of prior oleg, hematoma present  9/19: No issues  Coumadin 3mg ordered, INR 1 75       9/20: No issues overnight   Patient complaining of disturbed sleep cycle due to medications (standing tylenol ordered for 0400)  Pain overall well controlled with current regimen  Tylenol changed to PRN at patients request  INR 1 91; coumadin 3mg ordered  9/21: No events  C/o continued right hip/leg pain, sed rate WNL & CRP slightly elevated, defer to ID need for repeat imaging  INR 1 70, dose Coumadin 3mg tonight  PM: ID recommending repeat MRI sacroiliac joint to rule out any intramuscular collection or abscess  9/22: No events  MRI ordered  INR 1 53, dose Coumadin 5mg      9/23: Did not get MRI due to issues w/ Ativan order, corrected w/ APS this AM, for MRI tonight  No other events  INR 1 53, dose Coumadin 5mg tonight  GI consulted for active Hepatitis C infection/treatment recommendations  9/24: No events  MRI w/ stable right sacroiliitis, start Motrin per APS  GI to reevaluate today & d/w patient hepatitis management  INR 1 38, start Eliquis per Dr Monalisa Nevarez for DVT  PM: Abx oral 4 weeks s/p IV complete per ID for sacroiliitis  9/25: No events  Discontinue telem  9/26: No events  Wean Dilaudid/Ativan/Oxy per APS recommendations  9/27: No events  Discontinue PICC s/p LD abx  Stable for discharge to home  Condition at Discharge:   good     Discharge Physical Exam:  HEENT/NECK:  PERRLA  No jugular venous distention  Cardiac: Regular rate and rhythm  Pulmonary:  Breath sounds clear bilaterally  Abdomen:  Normal bowel sounds  Incisions: Sternum is stable  Incision is clean, dry, and intact  Extremities: Extremities warm/dry  Neuro: Alert and oriented X 3  Skin: Warm/Dry, without rashes or lesions  Discharge Data: Invalid input(s): LABGLOM  Results from last 7 days   Lab Units 09/24/20  0438 09/23/20  1120 09/23/20  0546   INR  1 38* 1 40* 1 53*       Discharge instructions/Information to patient and family:   See after visit summary for information provided to patient and family        Venkatesh Harorobinson was educated on restrictions regarding driving and lifting, and techniques of proper incisional care  They were specifically counselled on signs and symptoms of an incisional infection, and advised to contact our service immediately should they develop fevers, sweats, chill, redness or drainage at the site of any incisions  Provisions for Follow-Up Care:  See after visit summary for information related to follow-up care and any pertinent home health orders  Disposition:  Home    Planned Readmission:   No    Discharge Medications:  See after visit summary for reconciled discharge medications provided to patient and family  Margaret Severino was provided contact information and scheduled a follow up appointment with SARAH Holley  Additionally, follow up appointments have been scheduled for their primary care physician and primary cardiologist   Contact information was provided  Upon admission, troponins were Not checked    Margaret Severino was counseled on the importance of avoiding tobacco products  As with all patients whom have undergone open heart surgery, tobacco cessation medication was contraindicated at the time of discharge  ACE/ARB was Contraindicated secondary to hypotension      Autodiuresing well, no diuretics at discharge  Statin not indicated  Narcotic pain medication was prescribed in the form of Oxycodone  Prior to prescribing, their prescription profile was reviewed on the [place] department of health prescription drug monitoring program  Pain regimen listed below by acute pain services:  -acetaminophen p r n   -ibuprofen 800 mg p o  q 8 hours scheduled x14 days for sacroilitis  -duloxetine 60 mg p o  daily scheduled  -Zyprexa 5 mg p o  HS scheduled  -methocarbamol 750 mg p o  q 8 hours scheduled x14 days  -lidocaine patch x2 for 12 hours daily x14 days  -oxycodone 5 mg x 5 tablets with instructions to take only as needed for severe pain q 4 hours    F/u behavioral health vs PCP for psych medication refills      F/u w/ ID for antibiotics management  4 week supply given per ID  F/u labs per ID  The patient was informed that following their postoperative surgical evaluation, they will be referred to outpatient cardiac rehabilitation  They were counseled that this program is run by specialists who will help them safely strengthen their heart and prevent more heart disease  Cardiac rehabilitation will include exercise, relaxation, stress management, and heart-healthy nutrition  Caregivers will also check to make sure their medication regimen is working  During this admission, the patient was questioned on their use of tobacco, alcohol, and illicit/non-prescription drug use in the  previous 24 months  During this time frame they admit to using illicit/non-prescription drugs and tobacco  As such they have been counseled on the importance of cessation and abstinence  I spent 45 minutes discharging the patient  This time was spent on the day of discharge  I had direct contact with the patient on the day of discharge  Additional documentation is required if more than 30 minutes were spent on discharge       SIGNATURE: Yobani Talavera PA-C  DATE: September 27, 2020  TIME: 9:01 AM

## 2020-09-27 NOTE — PROGRESS NOTES
Progress Note - Cardiothoracic Surgery   Estle Nine 34 y o  female MRN: [id number]  Unit/Bed#: [facility] 421-01 Encounter: [id number]  Tricuspid Valve Endocarditis  S/P tricuspid valve repair; POD # 17    24 Hour Events: Asked for Dilaudid yesterday, did well w/o QHS  Asking to leave sooner than LD abx for 2200, ok per ID to have abx a little early  No other events      Medications:   Scheduled Meds:  Current Facility-Administered Medications   Medication Dose Route Frequency Provider Last Rate    acetaminophen  975 mg Oral Q8H PRN Bhavin Avila PA-C      apixaban  10 mg Oral BID Carmelita Ontiveros PA-C      ascorbic acid  500 mg Oral Daily Eliu Angelo, MILLIE      aspirin  81 mg Oral Daily Miky Hand, MILLIE      bisacodyl  10 mg Rectal Daily PRN Miky Hand, MILLIE      calcium acetate  667 mg Oral TID With Meals Miky Hand, MILLIE      calcium carbonate  1,000 mg Oral Daily PRN Miky Hand, MILLIE      cefazolin  2,000 mg Intravenous Q8H Rosario Gutierrez PA-C 2,000 mg (09/27/20 0518)    cholecalciferol  1,000 Units Oral Daily Reg Shields MD      dicyclomine  10 mg Oral TID PRN Miky Hand, MILLIE      docusate sodium  100 mg Oral BID Miky Hand, MILLIE      DULoxetine  60 mg Oral Daily Miky Hand, MILLIE      ergocalciferol  50,000 Units Oral Once per day on Mon Wed Fri Lindsay Marcano PA-C      ferrous sulfate  325 mg Oral Daily With Breakfast Amara Browning PA-C      ibuprofen  800 mg Oral Q8H [address] Rosario Gutierrez PA-C      iohexol  50 mL Oral 90 min pre-procedure Miky Hand, MILLIE      lidocaine  2 patch Topical Daily Miky Hand, MILLIE      LORazepam  0 5 mg Intravenous Q6H PRN Rosario Gutierrez PA-C      LORazepam  3 mg Intravenous Once Eliu Angelo, MILLIE      magnesium oxide  400 mg Oral BID Amara Browning PA-C      melatonin  6 mg Oral HS Eliu Angelo, MILLIE      methocarbamol  750 mg Oral [facility] Bhavin Avila PA-C      metoprolol tartrate  12 5 mg Oral Q12H [address] Peg Sterling, MILLIE      miconazole   Topical BID [place], MILLIE      mirtazapine  30 mg Oral HS Yanelsa KESHAV Browning, MILLIE      OLANZapine  5 mg Oral HS Amara Browning, MILLIE      ondansetron  4 mg Intravenous Q6H PRN [employer], PA-NALLELY      oxyCODONE  5 mg Oral Q4H PRN Peg Sterling, MILLIE      pantoprazole  40 mg Oral Daily [facility], PA-NALLELY      polyethylene glycol  17 g Oral Daily [place], MILLIE      saccharomyces boulardii  250 mg Oral BID [place], MILLIE      senna  1 tablet Oral BID [place], MILLIE       Continuous Infusions:   PRN Meds:   acetaminophen    bisacodyl    calcium carbonate    dicyclomine    LORazepam    ondansetron    oxyCODONE    Vitals:   Vitals:    09/26/20 0807 09/26/20 1506 09/26/20 2240 09/27/20 0406   BP: 91/55 109/66 107/60    BP Location: Right arm Left arm Left arm    Pulse: 58 70 56    Resp: 18 18 16    Temp: 98 °F (36 7 °C) 97 9 °F (36 6 °C) 98 2 °F (36 8 °C)    TempSrc: Oral Oral Oral    SpO2:  97% 99%    Weight:    71 1 kg (156 lb 12 oz)   Height:         Bp x24hrs: /50-70    Telemetry: none    Respiratory:   SpO2: SpO2: 99 %, SpO2 Activity: SpO2 Activity: At Rest, SpO2 Device: O2 Device: None (Room air); Room Air    Intake/Output:   I/O       09/25 0701 - 09/26 0700 09/26 0701 - 09/27 0700    P  O  600 118    IV Piggyback 101 7 50    Total Intake(mL/kg) 701 7 (9 8) 168 (2 4)    Net +701 7 +168          Unmeasured Urine Occurrence  3 x        UOP - none being recorded    Chest tube Output:  CTs & EPWs have been discontinued    Weights:   Weight (last 2 days)     Date/Time   Weight    09/27/20 0406   71 1 (156 75)    09/26/20 0600   71 5 (157 63)    09/25/20 0600   70 5 (155 42)            Admit weight: 72kg - down 1kg from admission weight    Results:   NO NEW CBC OR BMP TODAY  Results from last 7 days   Lab Units 09/20/20  0900   WBC Thousand/uL 8 09   HEMOGLOBIN g/dL 9 0*   HEMATOCRIT % 28 6* PLATELETS Thousands/uL 413*     Results from last 7 days   Lab Units 09/20/20  0900   SODIUM mmol/L 139   POTASSIUM mmol/L 4 4   CHLORIDE mmol/L 107   CO2 mmol/L 28   BUN mg/dL 11   CREATININE mg/dL 0 59*   CALCIUM mg/dL 9 0     Results from last 7 days   Lab Units 09/24/20  0438 09/23/20  1120 09/23/20  0546   INR  1 38* 1 40* 1 53*     Point of care glucose: none being taken    Studies:  No new studies    I have personally reviewed pertinent reports  and I have personally reviewed pertinent films in PACS    Invasive Lines/Tubes:  Invasive Devices     Peripherally Inserted Central Catheter Line            PICC Line 08/26/20 31 days                Physical Exam:    HEENT/NECK:  PERRLA  No jugular venous distention  Cardiac: Regular rate and rhythm  Pulmonary:  Breath sounds clear bilaterally  Abdomen:  Normal bowel sounds  Incisions: Sternum is stable  Incision is clean, dry, and intact  Extremities: Extremities warm/dry  Neuro: Alert and oriented X 3  Skin: Warm/Dry, without rashes or lesions  Assessment:  Principal Problem:    Bacteremia due to Staphylococcus aureus  Active Problems:    Acute bacterial endocarditis    Septic embolism ([facility])    Bipolar 1 disorder ([facility])    Right hip pain    Intravenous drug abuse ([facility] [address] )    DVT of axillary vein, acute left ([facility])    Transaminitis    Rash    Anemia    Hyperphosphatemia    S/P TVR (tricuspid valve repair)       Tricuspid Valve Endocarditis  S/P tricuspid valve repair; POD # 17    Plan:    1  Cardiac:   NSR; HR/BP well-controlled  Lopressor, 12 5mg PO BID  Continue ASA therapy  Statin not indicated  Eliquis 10mg BID x7 days then 5mg BID  Epicardial pacing wires out  PICC for abx  Continue DVT prophylaxis    2  Pulmonary:   Good Room air oxygen saturation; Continue incentive spirometry/Coughing/Deep breathing exercises  Chest tubes have been discontinued    3   Renal:   Intake/Output net: (+)168 mL/24 hours -- innacurate I/Os  Autodiuresing well    4  Neuro:  Neurologically intact; No active issues  Incisional pain well-controlled  Continue Tylenol, 975 mg PO q 8, standing dose  Continue  Oxycodone to 5 mg PO q 4 hours prn pain per APS             Continue Lidoderm patch                    Continue Ativan through today, non at discharge per APS                    Continue Robaxin                    APS following        Continue Melatonin        Continue Cymbalta        Continue Remeron        Continue Zyprexa    5  GI:  Regular house diet  Maintain 1800 mL daily fluid restriction   Continue stool softeners and prn suppository  Continue GI prophylaxis    6  Endo:   No history of diabetes; Glucose well-controlled w/o SSIC  Continue vitamin D supplementation    7    Hematology:    Continue iron/vitamin C supplementation   Continue Magnesium supplementation    8  Disposition:      Ambulating independently, Anticipate discharge to home today s/p LD abx     VTE Pharmacologic Prophylaxis: Sequential compression device (Venodyne)   VTE Mechanical Prophylaxis: sequential compression device    Collaborative rounds completed with ANDRE Mulligan    Plan of care discussed with bedside nurse    SIGNATURE: Radha Katz PA-C  DATE: September 27, 2020  TIME: 6:13 AM

## 2020-09-28 ENCOUNTER — TELEPHONE (OUTPATIENT)
Dept: GASTROENTEROLOGY | Facility: CLINIC | Age: 30
End: 2020-09-28

## 2020-09-28 NOTE — TELEPHONE ENCOUNTER
Called pt to schedule a 12 week follow up apt with Dr Marco Antonio Douglas   Left a voicemail for the patient to call the office back to schedule

## 2020-10-02 ENCOUNTER — HOSPITAL ENCOUNTER (EMERGENCY)
Facility: HOSPITAL | Age: 30
Discharge: HOME | End: 2020-10-02
Attending: EMERGENCY MEDICINE | Admitting: EMERGENCY MEDICINE
Payer: COMMERCIAL

## 2020-10-02 VITALS
RESPIRATION RATE: 20 BRPM | WEIGHT: 155 LBS | SYSTOLIC BLOOD PRESSURE: 127 MMHG | HEART RATE: 90 BPM | BODY MASS INDEX: 25.83 KG/M2 | HEIGHT: 65 IN | OXYGEN SATURATION: 99 % | TEMPERATURE: 98.7 F | DIASTOLIC BLOOD PRESSURE: 76 MMHG

## 2020-10-02 DIAGNOSIS — F11.23 OPIOID WITHDRAWAL (HCC): ICD-10-CM

## 2020-10-02 DIAGNOSIS — R07.9 CHEST PAIN: ICD-10-CM

## 2020-10-02 DIAGNOSIS — Z86.79 HISTORY OF ACUTE BACTERIAL ENDOCARDITIS: ICD-10-CM

## 2020-10-02 DIAGNOSIS — F41.9 ANXIETY: Primary | ICD-10-CM

## 2020-10-02 PROCEDURE — 99285 EMERGENCY DEPT VISIT HI MDM: CPT

## 2020-10-02 PROCEDURE — 99284 EMERGENCY DEPT VISIT MOD MDM: CPT | Performed by: EMERGENCY MEDICINE

## 2020-10-02 RX ORDER — KETOROLAC TROMETHAMINE 30 MG/ML
15 INJECTION, SOLUTION INTRAMUSCULAR; INTRAVENOUS ONCE
Status: DISCONTINUED | OUTPATIENT
Start: 2020-10-02 | End: 2020-10-02 | Stop reason: HOSPADM

## 2020-10-02 RX ORDER — BUPRENORPHINE AND NALOXONE 8; 2 MG/1; MG/1
2 FILM, SOLUBLE BUCCAL; SUBLINGUAL DAILY
Status: DISCONTINUED | OUTPATIENT
Start: 2020-10-02 | End: 2020-10-02 | Stop reason: HOSPADM

## 2020-10-02 RX ORDER — METHOCARBAMOL 500 MG/1
500 TABLET, FILM COATED ORAL ONCE
Status: DISCONTINUED | OUTPATIENT
Start: 2020-10-02 | End: 2020-10-02

## 2020-10-15 ENCOUNTER — TELEPHONE (OUTPATIENT)
Dept: INFECTIOUS DISEASES | Facility: CLINIC | Age: 30
End: 2020-10-15

## 2020-10-20 ENCOUNTER — TELEPHONE (OUTPATIENT)
Dept: INFECTIOUS DISEASES | Facility: CLINIC | Age: 30
End: 2020-10-20

## 2020-10-21 ENCOUNTER — HOSPITAL ENCOUNTER (INPATIENT)
Facility: HOSPITAL | Age: 30
LOS: 4 days | Discharge: HOME | DRG: 773 | End: 2020-10-26
Attending: EMERGENCY MEDICINE | Admitting: INTERNAL MEDICINE
Payer: COMMERCIAL

## 2020-10-21 DIAGNOSIS — R10.84 GENERALIZED ABDOMINAL PAIN: ICD-10-CM

## 2020-10-21 DIAGNOSIS — M46.1 SACROILIITIS (HCC): ICD-10-CM

## 2020-10-21 DIAGNOSIS — R07.9 CHEST PAIN: Primary | ICD-10-CM

## 2020-10-21 DIAGNOSIS — F11.23 HEROIN WITHDRAWAL (HCC): ICD-10-CM

## 2020-10-21 DIAGNOSIS — F19.10 INTRAVENOUS DRUG ABUSE (HCC): ICD-10-CM

## 2020-10-21 DIAGNOSIS — F11.23 OPIOID WITHDRAWAL (HCC): ICD-10-CM

## 2020-10-21 DIAGNOSIS — R50.9 SUBJECTIVE FEVER: ICD-10-CM

## 2020-10-21 DIAGNOSIS — R11.0 NAUSEA: ICD-10-CM

## 2020-10-21 DIAGNOSIS — I33.0 ACUTE BACTERIAL ENDOCARDITIS: ICD-10-CM

## 2020-10-21 PROCEDURE — 99285 EMERGENCY DEPT VISIT HI MDM: CPT

## 2020-10-21 NOTE — LETTER
[address]  [address] [address]  Dept: [phone]    October 26, 2020     Patient: Westley Finney   YOB: 1990   Date of Visit: 10/21/2020       To Whom it May Concern:    Agustina Emery is under my professional care  She was seen in the hospital from 10/21/2020   to 10/26/20  I feel that she is unable to attend scheduled court date for today 10/26/2020 due to medical reasons  If you have any questions or concerns, please don't hesitate to call           Sincerely,          Nia Tan PA-C

## 2020-10-21 NOTE — LETTER
[address]  [address] [address]  Dept: [phone]    October 26, 2020     Patient: Kiera Lewis   YOB: 1990   Date of Visit: 10/21/2020       To Whom it May Concern:    Lane Mendez is under my professional care  She was seen in the hospital from 10/21/2020   to 10/26/20  I feel that she is unable to attend schedule court day for 10/22/2020 at this time due to medical reasons  If you have any questions or concerns, please don't hesitate to call           Sincerely,          Rc Marte PA-C

## 2020-10-22 ENCOUNTER — APPOINTMENT (EMERGENCY)
Dept: RADIOLOGY | Facility: HOSPITAL | Age: 30
DRG: 773 | End: 2020-10-22
Payer: COMMERCIAL

## 2020-10-22 PROBLEM — F11.93 HEROIN WITHDRAWAL (HCC): Status: ACTIVE | Noted: 2020-07-15

## 2020-10-22 PROBLEM — Z79.01 CHRONIC ANTICOAGULATION: Status: ACTIVE | Noted: 2020-10-22

## 2020-10-22 PROBLEM — K21.9 GERD (GASTROESOPHAGEAL REFLUX DISEASE): Status: ACTIVE | Noted: 2020-10-22

## 2020-10-22 PROBLEM — F11.23 HEROIN WITHDRAWAL (HCC): Status: ACTIVE | Noted: 2020-07-15

## 2020-10-22 LAB
ALBUMIN SERPL BCP-MCNC: 3.7 G/DL (ref 3.5–5)
ALP SERPL-CCNC: 124 U/L (ref 46–116)
ALT SERPL W P-5'-P-CCNC: 29 U/L (ref 12–78)
ANION GAP SERPL CALCULATED.3IONS-SCNC: 5 MMOL/L (ref 4–13)
AST SERPL W P-5'-P-CCNC: 27 U/L (ref 5–45)
BASOPHILS # BLD AUTO: 0.04 THOUSANDS/ΜL (ref 0–0.1)
BASOPHILS NFR BLD AUTO: 1 % (ref 0–1)
BILIRUB SERPL-MCNC: 0.32 MG/DL (ref 0.2–1)
BUN SERPL-MCNC: 6 MG/DL (ref 5–25)
CALCIUM SERPL-MCNC: 9.8 MG/DL (ref 8.3–10.1)
CHLORIDE SERPL-SCNC: 108 MMOL/L (ref 100–108)
CO2 SERPL-SCNC: 27 MMOL/L (ref 21–32)
CREAT SERPL-MCNC: 0.54 MG/DL (ref 0.6–1.3)
CRP SERPL QL: 43 MG/L
EOSINOPHIL # BLD AUTO: 0.39 THOUSAND/ΜL (ref 0–0.61)
EOSINOPHIL NFR BLD AUTO: 7 % (ref 0–6)
ERYTHROCYTE [DISTWIDTH] IN BLOOD BY AUTOMATED COUNT: 13.3 % (ref 11.6–15.1)
ERYTHROCYTE [SEDIMENTATION RATE] IN BLOOD: 34 MM/HOUR (ref 0–19)
EXT PREG TEST URINE: NEGATIVE
EXT. CONTROL ED NAV: NORMAL
GFR SERPL CREATININE-BSD FRML MDRD: 130 ML/MIN/1.73SQ M
GLUCOSE SERPL-MCNC: 97 MG/DL (ref 65–140)
HCT VFR BLD AUTO: 37 % (ref 34.8–46.1)
HGB BLD-MCNC: 11.6 G/DL (ref 11.5–15.4)
IMM GRANULOCYTES # BLD AUTO: 0.02 THOUSAND/UL (ref 0–0.2)
IMM GRANULOCYTES NFR BLD AUTO: 0 % (ref 0–2)
LYMPHOCYTES # BLD AUTO: 1.99 THOUSANDS/ΜL (ref 0.6–4.47)
LYMPHOCYTES NFR BLD AUTO: 35 % (ref 14–44)
MCH RBC QN AUTO: 26.2 PG (ref 26.8–34.3)
MCHC RBC AUTO-ENTMCNC: 31.4 G/DL (ref 31.4–37.4)
MCV RBC AUTO: 84 FL (ref 82–98)
MONOCYTES # BLD AUTO: 0.39 THOUSAND/ΜL (ref 0.17–1.22)
MONOCYTES NFR BLD AUTO: 7 % (ref 4–12)
NEUTROPHILS # BLD AUTO: 2.81 THOUSANDS/ΜL (ref 1.85–7.62)
NEUTS SEG NFR BLD AUTO: 50 % (ref 43–75)
NRBC BLD AUTO-RTO: 0 /100 WBCS
PLATELET # BLD AUTO: 410 THOUSANDS/UL (ref 149–390)
PMV BLD AUTO: 8.2 FL (ref 8.9–12.7)
POTASSIUM SERPL-SCNC: 4 MMOL/L (ref 3.5–5.3)
PROCALCITONIN SERPL-MCNC: <0.05 NG/ML
PROT SERPL-MCNC: 8.1 G/DL (ref 6.4–8.2)
RBC # BLD AUTO: 4.42 MILLION/UL (ref 3.81–5.12)
SODIUM SERPL-SCNC: 140 MMOL/L (ref 136–145)
WBC # BLD AUTO: 5.64 THOUSAND/UL (ref 4.31–10.16)

## 2020-10-22 PROCEDURE — 99223 1ST HOSP IP/OBS HIGH 75: CPT | Performed by: INTERNAL MEDICINE

## 2020-10-22 PROCEDURE — 80053 COMPREHEN METABOLIC PANEL: CPT | Performed by: EMERGENCY MEDICINE

## 2020-10-22 PROCEDURE — 93005 ELECTROCARDIOGRAM TRACING: CPT

## 2020-10-22 PROCEDURE — 99255 IP/OBS CONSLTJ NEW/EST HI 80: CPT | Performed by: PHYSICIAN ASSISTANT

## 2020-10-22 PROCEDURE — 71046 X-RAY EXAM CHEST 2 VIEWS: CPT

## 2020-10-22 PROCEDURE — 81025 URINE PREGNANCY TEST: CPT | Performed by: EMERGENCY MEDICINE

## 2020-10-22 PROCEDURE — 99285 EMERGENCY DEPT VISIT HI MDM: CPT | Performed by: EMERGENCY MEDICINE

## 2020-10-22 PROCEDURE — 86140 C-REACTIVE PROTEIN: CPT | Performed by: EMERGENCY MEDICINE

## 2020-10-22 PROCEDURE — 84145 PROCALCITONIN (PCT): CPT | Performed by: EMERGENCY MEDICINE

## 2020-10-22 PROCEDURE — 85652 RBC SED RATE AUTOMATED: CPT | Performed by: EMERGENCY MEDICINE

## 2020-10-22 PROCEDURE — 85025 COMPLETE CBC W/AUTO DIFF WBC: CPT | Performed by: EMERGENCY MEDICINE

## 2020-10-22 PROCEDURE — 36415 COLL VENOUS BLD VENIPUNCTURE: CPT | Performed by: EMERGENCY MEDICINE

## 2020-10-22 RX ORDER — LIDOCAINE 40 MG/G
CREAM TOPICAL ONCE
Status: COMPLETED | OUTPATIENT
Start: 2020-10-22 | End: 2020-10-22

## 2020-10-22 RX ORDER — ONDANSETRON 4 MG/1
4 TABLET, ORALLY DISINTEGRATING ORAL EVERY 6 HOURS PRN
Status: DISCONTINUED | OUTPATIENT
Start: 2020-10-22 | End: 2020-10-26 | Stop reason: HOSPADM

## 2020-10-22 RX ORDER — CALCIUM ACETATE 667 MG/1
667 CAPSULE ORAL
Status: DISCONTINUED | OUTPATIENT
Start: 2020-10-22 | End: 2020-10-26 | Stop reason: HOSPADM

## 2020-10-22 RX ORDER — MELATONIN
1000 DAILY
Status: DISCONTINUED | OUTPATIENT
Start: 2020-10-23 | End: 2020-10-26 | Stop reason: HOSPADM

## 2020-10-22 RX ORDER — MIRTAZAPINE 30 MG/1
30 TABLET, FILM COATED ORAL
Status: DISCONTINUED | OUTPATIENT
Start: 2020-10-22 | End: 2020-10-26 | Stop reason: HOSPADM

## 2020-10-22 RX ORDER — BUPRENORPHINE AND NALOXONE 8; 2 MG/1; MG/1
1 FILM, SOLUBLE BUCCAL; SUBLINGUAL ONCE
Status: COMPLETED | OUTPATIENT
Start: 2020-10-22 | End: 2020-10-22

## 2020-10-22 RX ORDER — FERROUS SULFATE 325(65) MG
325 TABLET ORAL
Status: DISCONTINUED | OUTPATIENT
Start: 2020-10-23 | End: 2020-10-26 | Stop reason: HOSPADM

## 2020-10-22 RX ORDER — DOCUSATE SODIUM 100 MG/1
100 CAPSULE, LIQUID FILLED ORAL 2 TIMES DAILY
Status: DISCONTINUED | OUTPATIENT
Start: 2020-10-22 | End: 2020-10-22

## 2020-10-22 RX ORDER — CLONIDINE HYDROCHLORIDE 0.1 MG/1
0.1 TABLET ORAL EVERY 8 HOURS SCHEDULED
Status: DISCONTINUED | OUTPATIENT
Start: 2020-10-22 | End: 2020-10-26 | Stop reason: HOSPADM

## 2020-10-22 RX ORDER — DULOXETIN HYDROCHLORIDE 60 MG/1
60 CAPSULE, DELAYED RELEASE ORAL DAILY
Status: DISCONTINUED | OUTPATIENT
Start: 2020-10-23 | End: 2020-10-26 | Stop reason: HOSPADM

## 2020-10-22 RX ORDER — ACETAMINOPHEN 325 MG/1
975 TABLET ORAL EVERY 8 HOURS SCHEDULED
Status: DISCONTINUED | OUTPATIENT
Start: 2020-10-22 | End: 2020-10-26 | Stop reason: HOSPADM

## 2020-10-22 RX ORDER — METHOCARBAMOL 750 MG/1
750 TABLET, FILM COATED ORAL EVERY 8 HOURS SCHEDULED
Status: DISCONTINUED | OUTPATIENT
Start: 2020-10-22 | End: 2020-10-26 | Stop reason: HOSPADM

## 2020-10-22 RX ORDER — BUPRENORPHINE AND NALOXONE 8; 2 MG/1; MG/1
1 FILM, SOLUBLE BUCCAL; SUBLINGUAL DAILY
Status: DISCONTINUED | OUTPATIENT
Start: 2020-10-22 | End: 2020-10-22

## 2020-10-22 RX ORDER — ASPIRIN 81 MG/1
81 TABLET, CHEWABLE ORAL DAILY
Status: DISCONTINUED | OUTPATIENT
Start: 2020-10-23 | End: 2020-10-26 | Stop reason: HOSPADM

## 2020-10-22 RX ORDER — MAGNESIUM HYDROXIDE/ALUMINUM HYDROXICE/SIMETHICONE 120; 1200; 1200 MG/30ML; MG/30ML; MG/30ML
30 SUSPENSION ORAL EVERY 6 HOURS PRN
Status: DISCONTINUED | OUTPATIENT
Start: 2020-10-22 | End: 2020-10-26 | Stop reason: HOSPADM

## 2020-10-22 RX ORDER — LORAZEPAM 2 MG/ML
1 INJECTION INTRAMUSCULAR EVERY 6 HOURS PRN
Status: DISCONTINUED | OUTPATIENT
Start: 2020-10-22 | End: 2020-10-22

## 2020-10-22 RX ORDER — OLANZAPINE 5 MG/1
5 TABLET ORAL
Status: DISCONTINUED | OUTPATIENT
Start: 2020-10-22 | End: 2020-10-26 | Stop reason: HOSPADM

## 2020-10-22 RX ORDER — ACETAMINOPHEN 325 MG/1
650 TABLET ORAL EVERY 6 HOURS PRN
Status: DISCONTINUED | OUTPATIENT
Start: 2020-10-22 | End: 2020-10-26 | Stop reason: HOSPADM

## 2020-10-22 RX ORDER — ONDANSETRON 2 MG/ML
4 INJECTION INTRAMUSCULAR; INTRAVENOUS EVERY 6 HOURS PRN
Status: DISCONTINUED | OUTPATIENT
Start: 2020-10-22 | End: 2020-10-22

## 2020-10-22 RX ORDER — GABAPENTIN 300 MG/1
300 CAPSULE ORAL
Status: DISCONTINUED | OUTPATIENT
Start: 2020-10-22 | End: 2020-10-26 | Stop reason: HOSPADM

## 2020-10-22 RX ORDER — DOCUSATE SODIUM 100 MG/1
100 CAPSULE, LIQUID FILLED ORAL 2 TIMES DAILY
Status: DISCONTINUED | OUTPATIENT
Start: 2020-10-22 | End: 2020-10-26 | Stop reason: HOSPADM

## 2020-10-22 RX ORDER — LORAZEPAM 1 MG/1
1 TABLET ORAL EVERY 6 HOURS PRN
Status: DISCONTINUED | OUTPATIENT
Start: 2020-10-22 | End: 2020-10-26 | Stop reason: HOSPADM

## 2020-10-22 RX ORDER — HYDROXYZINE HYDROCHLORIDE 25 MG/1
25 TABLET, FILM COATED ORAL EVERY 6 HOURS PRN
Status: DISCONTINUED | OUTPATIENT
Start: 2020-10-22 | End: 2020-10-26 | Stop reason: HOSPADM

## 2020-10-22 RX ORDER — SENNOSIDES 8.6 MG
8.6 TABLET ORAL 2 TIMES DAILY
Status: DISCONTINUED | OUTPATIENT
Start: 2020-10-22 | End: 2020-10-26 | Stop reason: HOSPADM

## 2020-10-22 RX ORDER — PANTOPRAZOLE SODIUM 40 MG/1
40 TABLET, DELAYED RELEASE ORAL
Status: DISCONTINUED | OUTPATIENT
Start: 2020-10-23 | End: 2020-10-26 | Stop reason: HOSPADM

## 2020-10-22 RX ORDER — LANOLIN ALCOHOL/MO/W.PET/CERES
3 CREAM (GRAM) TOPICAL
Status: DISCONTINUED | OUTPATIENT
Start: 2020-10-22 | End: 2020-10-26 | Stop reason: HOSPADM

## 2020-10-22 RX ORDER — ASCORBIC ACID 500 MG
500 TABLET ORAL DAILY
Status: DISCONTINUED | OUTPATIENT
Start: 2020-10-23 | End: 2020-10-26 | Stop reason: HOSPADM

## 2020-10-22 RX ORDER — LOPERAMIDE HYDROCHLORIDE 2 MG/1
4 CAPSULE ORAL 4 TIMES DAILY PRN
Status: DISCONTINUED | OUTPATIENT
Start: 2020-10-22 | End: 2020-10-26 | Stop reason: HOSPADM

## 2020-10-22 RX ORDER — BUPRENORPHINE AND NALOXONE 2; .5 MG/1; MG/1
2 FILM, SOLUBLE BUCCAL; SUBLINGUAL ONCE
Status: DISCONTINUED | OUTPATIENT
Start: 2020-10-22 | End: 2020-10-22

## 2020-10-22 RX ORDER — BUPRENORPHINE AND NALOXONE 8; 2 MG/1; MG/1
1 FILM, SOLUBLE BUCCAL; SUBLINGUAL ONCE
Status: DISCONTINUED | OUTPATIENT
Start: 2020-10-22 | End: 2020-10-24

## 2020-10-22 RX ORDER — SACCHAROMYCES BOULARDII 250 MG
250 CAPSULE ORAL 2 TIMES DAILY
Status: DISCONTINUED | OUTPATIENT
Start: 2020-10-22 | End: 2020-10-26 | Stop reason: HOSPADM

## 2020-10-22 RX ORDER — IBUPROFEN 400 MG/1
800 TABLET ORAL EVERY 8 HOURS SCHEDULED
Status: DISCONTINUED | OUTPATIENT
Start: 2020-10-22 | End: 2020-10-26 | Stop reason: HOSPADM

## 2020-10-22 RX ADMIN — CALCIUM ACETATE 667 MG: 667 CAPSULE ORAL at 19:58

## 2020-10-22 RX ADMIN — Medication 250 MG: at 19:58

## 2020-10-22 RX ADMIN — LIDOCAINE 1 APPLICATION: 4 CREAM TOPICAL at 01:23

## 2020-10-22 RX ADMIN — MELATONIN 3 MG: at 21:10

## 2020-10-22 RX ADMIN — LORAZEPAM 1 MG: 1 TABLET ORAL at 18:35

## 2020-10-22 RX ADMIN — MAGNESIUM OXIDE TAB 400 MG (241.3 MG ELEMENTAL MG) 400 MG: 400 (241.3 MG) TAB at 19:59

## 2020-10-22 RX ADMIN — MIRTAZAPINE 30 MG: 30 TABLET, FILM COATED ORAL at 21:10

## 2020-10-22 RX ADMIN — METHOCARBAMOL TABLETS 750 MG: 750 TABLET, COATED ORAL at 21:10

## 2020-10-22 RX ADMIN — GABAPENTIN 300 MG: 300 CAPSULE ORAL at 21:10

## 2020-10-22 RX ADMIN — BUPRENORPHINE AND NALOXONE 1 FILM: 8; 2 FILM BUCCAL; SUBLINGUAL at 11:18

## 2020-10-22 RX ADMIN — ACETAMINOPHEN 650 MG: 325 TABLET, FILM COATED ORAL at 21:01

## 2020-10-22 RX ADMIN — IBUPROFEN 800 MG: 400 TABLET ORAL at 21:10

## 2020-10-22 RX ADMIN — OLANZAPINE 5 MG: 5 TABLET, FILM COATED ORAL at 21:10

## 2020-10-22 RX ADMIN — HYDROXYZINE HYDROCHLORIDE 25 MG: 25 TABLET ORAL at 21:00

## 2020-10-22 RX ADMIN — APIXABAN 5 MG: 5 TABLET, FILM COATED ORAL at 19:58

## 2020-10-22 RX ADMIN — Medication 12.5 MG: at 21:10

## 2020-10-22 NOTE — ASSESSMENT & PLAN NOTE
· History of IV drug abuse  · Noted to have tricuspid valve vegetation present echo in July   TTE and MELISSA completed in August   · With septic emboli to lungs and posterior iliacus muscle  · Blood cultures showed Staph aureus bacteremia endocarditis in August  · Recurrent MSSA bacteremia  · Started on IV cefazolin  · Underwent TVR 9/10/2020  · Sent home with antibiotics, cefadroxil, which she did not complete  · STAT Blood culture x3 ordered today 10/22/2020  · Elevated CRP at 43 and Sed Rate at 34  · Lactic acid, and procalcitonin reflex ordered 10/22  · ID consult

## 2020-10-22 NOTE — ED NOTES
Patient refusing IV, asking for a PICC line  States she was stuck 17 times yesterday  Dr Kimberly Morfin aware       Juan M Blackman RN  10/22/20 3032

## 2020-10-22 NOTE — ED ATTENDING ATTESTATION
10/21/2020  ICait MD, saw and evaluated the patient  I have discussed the patient with the resident/non-physician practitioner and agree with the resident's/non-physician practitioner's findings, Plan of Care, and MDM as documented in the resident's/non-physician practitioner's note, except where noted  All available labs and Radiology studies were reviewed  I was present for key portions of any procedure(s) performed by the resident/non-physician practitioner and I was immediately available to provide assistance  At this point I agree with the current assessment done in the Emergency Department  I have conducted an independent evaluation of this patient a history and physical is as follows:    ED Course      Emergency Department Note- Emanuel Eagle 34 y o  female MRN: [id number]    Unit/Bed#: ED 15 Encounter: [id number]    Emanuel Eagle is a 34 y o  female who presents with   Chief Complaint   Patient presents with    Post-op Problem     Patient reports she had heart surgery in Sept and she thinks she has an infection in her heart         History of Present Illness   HPI:  Emanuel Eagle is a 34 y o  female who presents for evaluation of:  Possible recurrent endocarditis  Patient is concerned that she may have endocarditis again because she has begun using IV heroin again  Patient notes that she has had fevers and chills  Patient denies pain in extremities  Patient typically injects the heroin into her arms  Patient's last heroin use was 2 days ago  Review of Systems   Constitutional: Positive for chills and fever  HENT: Positive for congestion and rhinorrhea  Respiratory: Negative for cough and shortness of breath  Cardiovascular: Positive for chest pain (left lateral chest; a sore sensation)  Negative for palpitations  Gastrointestinal: Positive for nausea  Negative for vomiting  Genitourinary: Positive for dysuria and frequency     Neurological: Negative for light-headedness and headaches  All other systems reviewed and are negative  No LMP recorded  Review of EMR: discharge from [facility] on 9/[facility]/20 for S   Aureus bacteremia, acute tricuspid endocarditis, septic pulmonary emboli  Historical Information   Past Medical History:   Diagnosis Date    Abnormal Pap smear of cervix     Anxiety     Depression     Endocarditis     2018    Hepatitis C     HPV (human papilloma virus) anogenital infection      Past Surgical History:   Procedure Laterality Date    IR PICC LINE PLACEMENT DOUBLE LUMEN  8/26/2020    KNEE SURGERY Left     MOUTH SURGERY      UT REPLACE TRICUSPID W CP BYPASS N/A 9/10/2020    Procedure: REPAIR VALVE TRICUSPID with Med[employer]onic 30mm 3D Contour  Annuloplasty Ring;  Surgeon: Sonia Isaac MD;  [facility]: [facility];  Service: Cardiac Surgery    TOOTH EXTRACTION N/A 9/7/2020    Procedure: EXTRACTION TOOTH 32;  Surgeon: Maynor Chan DMD;  Location: [facility];  Service: Maxillofacial     Social History   Social History     Substance and Sexual Activity   Alcohol Use Not Currently    Alcohol/week: 0 0 standard drinks     Social History     Substance and Sexual Activity   Drug Use Yes    Types: Heroin, Marijuana    Comment: last Heroin use in September; used "medical marijuana"      Social History     Tobacco Use   Smoking Status Former Smoker    Packs/day: 0 25    Types: Cigarettes    Last attempt to quit: 11/9/2016    Years since quitting: 3 9   Smokeless Tobacco Never Used     Family History: non-contributory    Meds/Allergies   all medications and allergies reviewed  Allergies   Allergen Reactions    Cat Hair Extract     Dog Epithelium     Latex     Pollen Extract        Objective   First Vitals:   Blood Pressure: 128/84 (10/21/20 2304)  Pulse: 83 (10/21/20 2304)  Temperature: 98 6 °F (37 °C) (10/21/20 2304)  Temp Source: Oral (10/21/20 2304)  Respirations: 18 (10/21/20 2304)  Height: 5' 5" (165 1 cm) (10/21/20 )  Weight - Scale: 70 3 kg (154 lb 15 7 oz) (10/21/20 2304)  SpO2: 96 % (10/21/20 2304)    Current Vitals:   Blood Pressure: 128/84 (10/21/20 2304)  Pulse: 83 (10/21/20 2304)  Temperature: 98 6 °F (37 °C) (10/21/20 2304)  Temp Source: Oral (10/21/20 2304)  Respirations: 18 (10/21/20 2304)  Height: 5' 5" (165 1 cm) (10/21/20 2304)  Weight - Scale: 70 3 kg (154 lb 15 7 oz) (10/21/20 2304)  SpO2: 96 % (10/21/20 2304)    No intake or output data in the 24 hours ending 10/22/20 0002    Invasive Devices     None                 Physical Exam  Vitals signs and nursing note reviewed  Constitutional:       Appearance: Normal appearance  HENT:      Head: Normocephalic and atraumatic  Cardiovascular:      Rate and Rhythm: Normal rate and regular rhythm  Pulmonary:      Effort: Pulmonary effort is normal  No respiratory distress  Abdominal:      General: Bowel sounds are normal       Palpations: Abdomen is soft  Musculoskeletal: Normal range of motion  General: No tenderness  Skin:     General: Skin is warm and dry  Capillary Refill: Capillary refill takes less than 2 seconds  Comments: Small eschars on hands and neck; no evidence of infection   Neurological:      General: No focal deficit present  Mental Status: She is alert and oriented to person, place, and time  Psychiatric:         Attention and Perception: She is inattentive  Mood and Affect: Affect is blunt and flat  Speech: She is noncommunicative  Speech is delayed  Behavior: Behavior is withdrawn  Thought Content: Thought content does not include homicidal or suicidal plan  Medical Decision Makin  Recurrent IVDU with heroin  2  Recent H/O endocarditis, tricuspid valve repair; S aureus bactermia    No results found for this or any previous visit (from the past 39 hour(s))  No orders to display         Portions of the record may have been created with voice recognition software  Occasional wrong word or "sound a like" substitutions may have occurred due to the inherent limitations of voice recognition software  Read the chart carefully and recognize, using context, where substitutions have occurred            Critical Care Time  Procedures

## 2020-10-22 NOTE — CONSULTS
Consult Note- Acute Pain Service   Oneyda Ayon 34 y o  female MRN: [id number]  Unit/Bed#: ED 15 Encounter: [id number]               Assessment/Plan     Assessment:   Patient Active Problem List   Diagnosis    Acute pyelonephritis    Loculated pleural effusion    Heroin withdrawal ([place] [id number] )    Bacteremia due to Staphylococcus aureus    Abscess of left foot    Insomnia    Acute bacterial endocarditis    Septic embolism (HCC)    Hypokalemia    Bipolar 1 disorder ([place] [address] )    Right hip pain    Intravenous drug abuse ([place] [address] )    DVT of axillary vein, acute left (HCC)    Transaminitis    Rash    Anemia    Chronic hepatitis C virus infection ([place] [address] )    Chronic, continuous use of opioids    Hyperphosphatemia    S/P TVR (tricuspid valve repair)    Chronic anticoagulation    GERD (gastroesophageal reflux disease)      Oneyda Ayon is a 34 y o  female  well-known to this service with history of IV opioid use disorder discharged on 9/27/20 following a 6 week course of IV antibiotics for endocarditis with tricuspid vegetation and subsequent tricuspid valve replacement  Patient returns today stating that she has been using heroin for the past 2 weeks with last use 2 days ago and feels as though she is in opioid withdrawal     Plan:    Acetaminophen 975 mg p o  q 8 hours scheduled   Clonidine 0 1 mg p o  q 8 hours scheduled   Hydroxyzine 25 mg p o  q 6 hours p r n  itching, anxiety, agitation   Lorazepam 1 mg IV q 6 hours p r n  anxiety, agitation   Loperamide 4 mg p o  4 times daily p r n  diarrhea, maximum 8 capsules a day   Zofran 4 mg IV q 6 hours p r n  nausea   Melatonin 3 mg p o  HS scheduled   Gabapentin 300 mg p o  HS scheduled   Patient given Suboxone 8/2 mg sublingual film x1 at 11:18  Continues to have elevated COWS score and ordered additional 4/1 mg sublingual Suboxone  Will continue to assess withdrawal symptoms and dose Suboxone accordingly     Once patient has less severe withdrawal symptoms, will discuss ongoing plan for abstinence  APS will continue to follow  Please call  / [phone] or [facility] - [facility] (/ between [phone] and on weekends) with questions or concerns    History of Present Illness    Admit Date:  10/21/2020  Hospital Day:  0 days  Primary Service:  General Medicine  Attending Provider:  Radha Rollins MD  Reason for Consult / Principal Problem:  Acute opioid withdrawal syndrome  HPI: Fox Mahoney is a 34 y o  female who presents with symptoms of opioid withdrawal   Patient is well-known to this service  She was discharged on 9/27/20 following a greater than 6 week course of IV antibiotics for bacteremia, endocarditis, tricuspid valve vegetation with subsequent tricuspid valve replacement, septic emboli, severe right hip pain  Patient was discharged with a short course of oxycodone and states that, despite instructions to the contrary, was lifting her 1year-old daughter following sternotomy  Patient was seen once in the emergency room earlier this month with complaints of chest wall pain  Currently, patient has complaints of body aches, subjective fever, chills, hot flashes, diaphoresis, nausea, abdominal discomfort and cramping, headache, anxiety, agitation, tremor  Patient states that she began using IV heroin again approximately 2 weeks ago, up to 2 bundles a day  States her last use was 2 days ago and symptoms of withdrawal started approximately 12-24 hours following this  Patient received Suboxone 8/2 mg sublingual film in the emergency department this morning and states that her symptoms have not improved  Patient was prescribed a 7 day course of Suboxone 8/2 mg sublingual films by her primary care physician 6 days ago  Current pain location(s):  Generalized body aches  Pain Scale:   6/10  Quality:  Aching  Current Analgesic regimen:    Acetaminophen 975 mg p o  q 8 hours scheduled    Gabapentin 300 mg p  o  HS scheduled  Pain History:  No history of chronic pain  Pain Management Provider:  No outpatient pain management provider    I have reviewed the patient's controlled substance dispensing history in the Prescription Drug Monitoring Program in compliance with the [facility] regulations before prescribing any controlled substances  Past 1 year review of PDMP:  Fill Date ID   Written Drug Qty Days Prescriber Rx # Pharmacy Refill   Daily Dose* Pymt Type      10/16/2020  1   10/16/2020  Buprenorp-Nalox 8-2 MG SL Film  7 00  7 [address] [place]   [id number]   Pen (7562)   0  8 00 [place]   09/27/2020  1   09/27/2020  Oxycodone Hcl 5 MG Tablet  5 00  1 [place]   [id number]   St (9996)   0  37 50 MME  [place]   04/07/2020  1   04/07/2020  Buprenorp-Nalox 8-2 MG SL Film  10 00  5 [address] [place]   [id number]   Pen (7562)   0  16 00 mg  [place]   03/16/2020  1   03/11/2020  Buprenorp-Nalox 8-2 MG SL Film  28 00  14 [address]   [id number]   Pen (4835)   0  16 00 mg  [place]   03/12/2020  4   12/30/2019  Sublocade 300 Mg/1 5 Ml [place]  1 00  30 Ab [place]   [id number]   Acc (1167)   1  10 00 mg  [place]   01/20/2020  4   12/30/2019  Sublocade 300 Mg/1 5 Ml [place]  1 00  30 Ab Let   [id number]   Acc (1167)   0  10 00 mg  [place]   01/09/2020  2   01/09/2020  Buprenorp-Nalox 8-2 MG SL Film  28 00  14 [address] [place]   [id number]   Rit (2955)   0  16 00 mg  [place]   12/20/2019  3   12/18/2019  Buprenorp-Nalox 8-2 MG SL Film  28 00  [address]   [id number]   [place] (3057)   0  16 00 mg  [place]   12/19/2019  3   12/18/2019  Buprenorp-Nalox 8-2 MG SL Film  4 00  2 [address]   [id number]   [place] (3057)   0  16 00 mg  [place]         Consults    Review of Systems   Constitutional: Positive for activity change, appetite change, chills, diaphoresis, fatigue and fever (Subjective)  HENT: Positive for congestion and rhinorrhea  Eyes: Negative  Respiratory: Negative  Cardiovascular: Negative      Gastrointestinal: Positive for meds have been reviewed, current meds:   Current Facility-Administered Medications   Medication Dose Route Frequency    acetaminophen (TYLENOL) tablet 975 mg  975 mg Oral Q8H [address]    buprenorphine-naloxone (SUBOXONE) 8-2 mg per SL film 1 Film  1 Film Sublingual Once    cloNIDine (CATAPRES) tablet 0 1 mg  0 1 mg Oral Q8H [address]    gabapentin (NEURONTIN) capsule 300 mg  300 mg Oral HS    hydrOXYzine HCL (ATARAX) tablet 25 mg  25 mg Oral Q6H PRN    loperamide (IMODIUM) capsule 4 mg  4 mg Oral 4x Daily PRN    LORazepam (ATIVAN) injection 1 mg  1 mg Intravenous Q6H PRN    melatonin tablet 3 mg  3 mg Oral HS    ondansetron (ZOFRAN) injection 4 mg  4 mg Intravenous Q6H PRN    and PTA meds:   Prior to Admission Medications   Prescriptions Last Dose Informant Patient Reported? Taking? DULoxetine (CYMBALTA) 60 mg delayed release capsule   No No   Sig: Take 1 capsule (60 mg total) by mouth daily   OLANZapine (ZyPREXA) 5 mg tablet   No No   Sig: Take 1 tablet (5 mg total) by mouth daily at bedtime   apixaban (ELIQUIS) 5 mg   No No   Sig: Take 2 tablets (10mg) BID through AM 10/4 then 1 tablet (5mg) BID for total of 3 months  ascorbic acid (VITAMIN C) 500 MG tablet   No No   Sig: Take 1 tablet (500 mg total) by mouth daily   aspirin 81 mg chewable tablet   No No   Sig: Chew 1 tablet (81 mg total) daily   calcium acetate (PHOSLO) 667 mg capsule   No No   Sig: Take 1 capsule (667 mg total) by mouth 3 (three) times a day with meals   cefadroxil (DURICEF) 500 mg capsule   No No   Sig: Take 1 capsule (500 mg total) by mouth every 12 (twelve) hours   cholecalciferol (VITAMIN D3) 1,000 units tablet   No No   Sig: Take 1 tablet (1,000 Units total) by mouth daily   docusate sodium (COLACE) 100 mg capsule   No No   Sig: Take 1 capsule (100 mg total) by mouth 2 (two) times a day Hold for soft stools     ferrous sulfate 325 (65 Fe) mg tablet   No No   Sig: Take 1 tablet (325 mg total) by mouth daily with breakfast   ibuprofen (MOTRIN) 800 mg tablet   No No   Sig: Take 1 tablet (800 mg total) by mouth every 8 (eight) hours for 14 days   lidocaine (LIDODERM) 5 %   No No   Sig: Apply 2 patches topically daily for 14 days Remove & Discard patch within 12 hours or as directed by MD   magnesium oxide (MAG-OX) 400 mg   No No   Sig: Take 1 tablet (400 mg total) by mouth 2 (two) times a day   methocarbamol (ROBAXIN) 750 mg tablet   No No   Sig: Take 1 tablet (750 mg total) by mouth every 8 (eight) hours for 14 days   metoprolol tartrate (LOPRESSOR) 25 mg tablet   No No   Sig: Take 0 5 tablets (12 5 mg total) by mouth every 12 (twelve) hours   mirtazapine (REMERON) 30 mg tablet   No No   Sig: Take 1 tablet (30 mg total) by mouth daily at bedtime   oxyCODONE (ROXICODONE) 5 mg immediate release tablet   No No   Sig: Take 1 tablet (5 mg total) by mouth every 4 (four) hours as needed for severe pain for up to 5 dosesMax Daily Amount: 30 mg   pantoprazole (PROTONIX) 40 mg tablet   No No   Sig: Take 1 tablet (40 mg total) by mouth daily   polyethylene glycol (MIRALAX) 17 g packet   No No   Sig: Take 17 g by mouth daily Hold for soft stools  saccharomyces boulardii (FLORASTOR) 250 mg capsule   No No   Sig: Take 1 capsule (250 mg total) by mouth 2 (two) times a day Hold for soft stools  senna (SENOKOT) 8 6 mg   No No   Sig: Take 1 tablet (8 6 mg total) by mouth 2 (two) times a day Hold for soft stools  Facility-Administered Medications: None       Allergies   Allergen Reactions    Cat Hair Extract     Dog Epithelium     Latex     Pollen Extract        Objective   Temp:  [98 6 °F (37 °C)] 98 6 °F (37 °C)  HR:  [59-83] 63  Resp:  [11-18] 14  BP: (102-128)/(50-84) 120/63  No intake or output data in the 24 hours ending 10/22/20 5803    Physical Exam  Vitals signs and nursing note reviewed  Constitutional:       General: She is awake  She is in acute distress  Appearance: She is ill-appearing and diaphoretic  She is not toxic-appearing  HENT:      Head: Normocephalic and atraumatic  Eyes:      Conjunctiva/sclera: Conjunctivae normal       Pupils: Pupils are equal, round, and reactive to light  Comments: Mildly dilated   Neck:      Musculoskeletal: Full passive range of motion without pain, normal range of motion and neck supple  Cardiovascular:      Rate and Rhythm: Normal rate and regular rhythm  Pulmonary:      Effort: Pulmonary effort is normal       Breath sounds: Normal breath sounds  Abdominal:      General: Abdomen is flat  Palpations: Abdomen is soft  Tenderness: There is no abdominal tenderness  Skin:     General: Skin is warm and moist    Neurological:      General: No focal deficit present  Mental Status: She is alert and oriented to person, place, and time  GCS: GCS eye subscore is 4  GCS verbal subscore is 5  GCS motor subscore is 6  Psychiatric:         Attention and Perception: Attention normal          Mood and Affect: Mood is anxious  Speech: Speech normal          Behavior: Behavior is agitated  Behavior is cooperative  Thought Content: Thought content normal          Lab Results:   I have personally reviewed pertinent labs  , CBC:   Lab Results   Component Value Date    WBC 5 64 10/22/2020    HGB 11 6 10/22/2020    HCT 37 0 10/22/2020    MCV 84 10/22/2020     (H) 10/22/2020    MCH 26 2 (L) 10/22/2020    MCHC 31 4 10/22/2020    RDW 13 3 10/22/2020    MPV 8 2 (L) 10/22/2020    NRBC 0 10/22/2020   , CMP:   Lab Results   Component Value Date    SODIUM 140 10/22/2020    K 4 0 10/22/2020     10/22/2020    CO2 27 10/22/2020    BUN 6 10/22/2020    CREATININE 0 54 (L) 10/22/2020    CALCIUM 9 8 10/22/2020    AST 27 10/22/2020    ALT 29 10/22/2020    ALKPHOS 124 (H) 10/22/2020    EGFR 130 10/22/2020   , BMP:  Lab Results   Component Value Date    SODIUM 140 10/22/2020    K 4 0 10/22/2020     10/22/2020    CO2 27 10/22/2020    BUN 6 10/22/2020    CREATININE 0 54 (L) 10/22/2020    GLUC 97 10/22/2020    CALCIUM 9 8 10/22/2020    AGAP 5 10/22/2020    EGFR 130 10/22/2020   , PT/PTT:No results found for: PT, PTT, urine pregnancy negative  Imaging Studies: I have personally reviewed pertinent reports  EKG, Pathology, and Other Studies: I have personally reviewed pertinent reports  Counseling / Coordination of Care  Total floor / unit time spent today Level 5 = 110 minutes  Greater than 50% of total time was spent with the patient and / or family counseling and / or coordination of care  A description of the counseling / coordination of care:  Patient interview, physical examination, review of PDMP, review of medical record, review of imaging and laboratory data, development of pain management plan, discussion of pain management plan with patient and primary service  Please note that the APS provides consultative services regarding pain management only  With the exception of ketamine and epidural infusions and except when indicated, final decisions regarding starting or changing doses of analgesic medications are at the discretion of the consulting service  Off hours consultation and/or medication management is generally not available      Ten Anderson PA-C  Acute Pain Service

## 2020-10-22 NOTE — H&P
H&P- Damian Stevens 1990, 34 y o  female MRN: [id number]    Unit/Bed#: ED 15 Encounter: [id number]    Primary Care Provider: Kiet Cisneros PA-C   Date and time admitted to hospital: 10/21/2020 11:26 PM        * Heroin withdrawal ([facility] [address] )  Assessment & Plan  · Patient has history of IV drug abuse; reports most recently using IV heroin, cocaine, and benzodiazepines   · Presents today reporting symptoms on withdrawal from the weekend: tremors, chest pain, fevers, chills, diaphoresis  · Given Suboxone in the ED   · Acute Pain management consulted for management of withdrawals symptoms   · Evaluated with a COWS scale of 20 after 8 mg suboxone, another 4 mg soboxone given  · Start clonidine 0 1 TID, Tylenol 975 mg Q8H, gabapentin 300 HS, melatonin 3 mg HS  · Start zofran prn, loperamide prn, Hydroxyzne 50 mg Q6H prn agitation and anxiety   · Continue 60 mg cymbalta, 30 kg remeron, 5 mg zyprexa, ibuprofen 800 mg, robaxin 750 mg  · Start and continue 1 mg IV Ativan prn Q6H   · CBC, CMP, urinalysis   · PT and OT eval    Acute bacterial endocarditis  Assessment & Plan  · History of IV drug abuse  · Noted to have tricuspid valve vegetation present echo in July   TTE and MELISSA completed in August   · With septic emboli to lungs and posterior iliacus muscle  · Blood cultures showed Staph aureus bacteremia endocarditis in August  · Recurrent MSSA bacteremia  · Started on IV cefazolin  · Underwent TVR 9/10/2020  · Sent home with antibiotics, cefadroxil, which she did not complete  · STAT Blood culture x2 ordered today 10/22/2020  · Elevated CRP at 43 and Sed Rate at 34  · Lactic acid, and procalcitonin reflex ordered 10/22  · ID consult      S/P TVR (tricuspid valve repair)  Assessment & Plan  · TVR performed 9/10/20 for acute bacterial endocarditis (MSSA bacteremia, staph aureus)   · History of IVDA   · Sent home with cefadroxil antibiotics but did not complete course  · Started using IV drugs again; heroine, cocaine, Ventricular Rate : 117  Atrial Rate : 117  P-R Interval : 148  QRS Duration : 82  Q-T Interval : 338  QTC Calculation(Bazett) : 471  P Axis : 59  R Axis : 39  T Axis : 47  Diagnosis : Sinus tachycardia  Otherwise normal ECG    Confirmed by Micheline SILVA, Kwesi ([id number]) on 7/1/2020 2:39:32 PM   benzodiazepines  · Continue 5 mg eliquis, 800 mg ibuprofen, 81 mg ASA, 25 mg metoprolol  · Continue 500 mg ascorbic acid, 667 mg calcium acetate, 1000 units vitamin 3,  325 mg ferrous sulfate  · ID consulted     Bipolar 1 disorder (HCC)  Assessment & Plan  · Continue cymbalta 60 mg, remeron 30 mg, zyprexa 5 mg   · Start IV ativan 1 mg PRN  · Patient has known history of signing out AMA and refusing care     Chronic anticoagulation  Assessment & Plan  · Pt has a history of prior DVT left axillary vein  · Continue Eliquis 5 mg  · Monitor INR    GERD (gastroesophageal reflux disease)  Assessment & Plan  · Continue pantoprazole 40 mg     VTE Prophylaxis: Apixaban (Eliquis)  / foot pump applied   Code Status: Level 1, Full Code  POLST: There is no POLST form on file for this patient (pre-hospital)  Discussion with family: n/a    Anticipated Length of Stay:  Patient will be admitted on an Inpatient basis with an anticipated length of stay of  more than 2 midnights  Justification for Hospital Stay: heroine withdrawal    Total Time for Visit, including Counseling / Coordination of Care: 45 minutes  Greater than 50% of this total time spent on direct patient counseling and coordination of care  Chief Complaint:  "I'm withdrawing from heroin"    History of Present Illness:    Westley Finney is a 34 y o  female who presents stating that she is in withdrawal  Patient is lying in bed and presents agitated and anxious appearing  She states she has most recently been using IV heroine, cocaine, and benzodiazepines over the weekend and now feels as though she in going through withdrawal  When asked what symptoms of withdrawal she is having she states "like all of them"  Reports recent fever, chills, night sweats, hand tremors, diarrhea, and chest pain  Patient states the pain is located on the left side of her chest and is none radiating  Had just received Suboxone and was not yet feeling better   States she was anxious to get washed up and had not showered in multiple days because she has been "in [facility] getting high all weekend"  States she has experienced withdrawal before and this is presenting the same way as usual  Has attended rehab previously  Patient has a history of IVDA and underwent a TVR back in September for staph aureus bacteremia endocarditis  Patient was discharged with antibiotics but she did not complete these medications  She is unaware on how much she took and when she stopped  ID has been consulted - she currently has elevated CRP and Sed rate but WBC WNL  Patient was also seen in the ED on 10/2/20 reporting with chest pain and complaints of opioid withdrawal  She eloped before evaluation of cardiac work up after becoming upset due to a failed attempt at IV placement  She was most recently seen 2 days ago at [facility] with concerns on repeat endocarditis but signed out AMA  Blood cultures obtained at [facility] showed no growth at this time  Patient will be admitted for further workup, ID consult, and acute pain management consult for withdrawal symptoms  Review of Systems:    Review of Systems   Constitutional: Positive for chills, diaphoresis, fatigue and fever  HENT: Negative  Eyes: Negative  Respiratory: Negative for cough and shortness of breath  Cardiovascular: Positive for chest pain  Negative for palpitations and leg swelling  Gastrointestinal: Positive for abdominal pain and diarrhea  Negative for nausea and vomiting  Endocrine: Negative  Genitourinary: Negative  Musculoskeletal: Negative  Skin: Positive for rash (buttocks)  Neurological: Positive for tremors  Negative for headaches  Psychiatric/Behavioral: Positive for agitation  The patient is nervous/anxious          Past Medical and Surgical History:     Past Medical History:   Diagnosis Date    Abnormal Pap smear of cervix     Anxiety     Depression     Endocarditis     2018    Hepatitis C     HPV (human papilloma virus) anogenital infection        Past Surgical History:   Procedure Laterality Date    IR PICC LINE PLACEMENT DOUBLE LUMEN  8/26/2020    KNEE SURGERY Left     MOUTH SURGERY      ID REPLACE TRICUSPID W CP BYPASS N/A 9/10/2020    Procedure: REPAIR VALVE TRICUSPID with Medtronic 30mm 3D Contour  Annuloplasty Ring;  Surgeon: Gio Harden MD;  Location: [facility];  Service: Cardiac Surgery    TOOTH EXTRACTION N/A 9/7/2020    Procedure: EXTRACTION TOOTH 28;  Surgeon: Deborah Roblero DMD;  Location: [facility];  Service: Maxillofacial       Meds/Allergies:    Prior to Admission medications    Medication Sig Start Date End Date Taking? Authorizing Provider   apixaban (ELIQUIS) 5 mg Take 2 tablets (10mg) BID through AM 10/4 then 1 tablet (5mg) BID for total of 3 months  9/27/20 12/27/20  David Frederick PA-C   ascorbic acid (VITAMIN C) 500 MG tablet Take 1 tablet (500 mg total) by mouth daily 9/27/20 12/26/20  David Frederick PA-C   aspirin 81 mg chewable tablet Chew 1 tablet (81 mg total) daily 9/27/20   David Frederick PA-C   calcium acetate (PHOSLO) 667 mg capsule Take 1 capsule (667 mg total) by mouth 3 (three) times a day with meals 9/27/20   David Frederick PA-C   cefadroxil (DURICEF) 500 mg capsule Take 1 capsule (500 mg total) by mouth every 12 (twelve) hours 9/28/20 10/28/20  David Frederick PA-C   cholecalciferol (VITAMIN D3) 1,000 units tablet Take 1 tablet (1,000 Units total) by mouth daily 9/27/20   David Frederick PA-C   docusate sodium (COLACE) 100 mg capsule Take 1 capsule (100 mg total) by mouth 2 (two) times a day Hold for soft stools   9/27/20 10/27/20  David Frederick PA-C   DULoxetine (CYMBALTA) 60 mg delayed release capsule Take 1 capsule (60 mg total) by mouth daily 9/27/20   David Frederick PA-C   ferrous sulfate 325 (65 Fe) mg tablet Take 1 tablet (325 mg total) by mouth daily with breakfast 9/27/20 12/26/20  David Frederick PA-C   ibuprofen (MOTRIN) 800 mg tablet Take 1 tablet (800 mg total) by mouth every 8 (eight) hours for 14 days 9/27/20 10/11/20  Joshua Cage PA-C   lidocaine (LIDODERM) 5 % Apply 2 patches topically daily for 14 days Remove & Discard patch within 12 hours or as directed by MD 9/28/20 10/12/20  Joshua Cage PA-C   magnesium oxide (MAG-OX) 400 mg Take 1 tablet (400 mg total) by mouth 2 (two) times a day 9/27/20   Joshua Cage PA-C   methocarbamol (ROBAXIN) 750 mg tablet Take 1 tablet (750 mg total) by mouth every 8 (eight) hours for 14 days 9/27/20 10/11/20  Joshua Cage PA-C   metoprolol tartrate (LOPRESSOR) 25 mg tablet Take 0 5 tablets (12 5 mg total) by mouth every 12 (twelve) hours 9/27/20   Joshua Cage PA-C   mirtazapine (REMERON) 30 mg tablet Take 1 tablet (30 mg total) by mouth daily at bedtime 9/27/20   Joshua Cage PA-C   OLANZapine (ZyPREXA) 5 mg tablet Take 1 tablet (5 mg total) by mouth daily at bedtime 9/27/20   Joshua Cage PA-C   oxyCODONE (ROXICODONE) 5 mg immediate release tablet Take 1 tablet (5 mg total) by mouth every 4 (four) hours as needed for severe pain for up to 5 dosesMax Daily Amount: 30 mg 9/27/20   Joshua Cage PA-C   pantoprazole (PROTONIX) 40 mg tablet Take 1 tablet (40 mg total) by mouth daily 9/27/20 10/27/20  Joshua Cage PA-C   polyethylene glycol (MIRALAX) 17 g packet Take 17 g by mouth daily Hold for soft stools  9/27/20 10/27/20  Joshua Cage PA-C   saccharomyces boulardii (FLORASTOR) 250 mg capsule Take 1 capsule (250 mg total) by mouth 2 (two) times a day Hold for soft stools  9/27/20 10/27/20  Joshua Cage PA-C   senna (SENOKOT) 8 6 mg Take 1 tablet (8 6 mg total) by mouth 2 (two) times a day Hold for soft stools  9/27/20 10/27/20  Joshua Cage PA-C     I have reviewed home medications with patient personally  Allergies:    Allergies   Allergen Reactions    Cat Hair Extract     Dog Epithelium     Latex     Pollen Extract        Social History:     Marital Status: Single   Occupation: none  Patient Pre-hospital Living Situation: at home  Patient Pre-hospital Level of Mobility: independent  Patient Pre-hospital Diet Restrictions: none  Substance Use History:   Social History     Substance and Sexual Activity   Alcohol Use Not Currently    Alcohol/week: 0 0 standard drinks     Social History     Tobacco Use   Smoking Status Former Smoker    Packs/day: 0 25    Types: Cigarettes    Last attempt to quit: 11/9/2016    Years since quitting: 3 9   Smokeless Tobacco Never Used     Social History     Substance and Sexual Activity   Drug Use Yes    Types: Heroin, Marijuana    Comment: last Heroin use in September; used "medical marijuana"        Family History:    History reviewed  No pertinent family history  Physical Exam:     Vitals:   Blood Pressure: 120/63 (10/22/20 1120)  Pulse: 63 (10/22/20 1120)  Temperature: 98 6 °F (37 °C) (10/21/20 2304)  Temp Source: Oral (10/21/20 2304)  Respirations: 14 (10/22/20 1120)  Height: 5' 5" (165 1 cm) (10/21/20 2304)  Weight - Scale: 70 3 kg (154 lb 15 7 oz) (10/21/20 2304)  SpO2: 98 % (10/22/20 1120)    Physical Exam  Vitals signs reviewed  Constitutional:       General: She is not in acute distress  Appearance: She is not toxic-appearing or diaphoretic  HENT:      Head: Normocephalic and atraumatic  Nose: No congestion  Eyes:      General: No scleral icterus  Extraocular Movements: Extraocular movements intact  Neck:      Musculoskeletal: Normal range of motion and neck supple  Cardiovascular:      Rate and Rhythm: Normal rate and regular rhythm  Pulses: Normal pulses  Heart sounds: Murmur present  Pulmonary:      Effort: Pulmonary effort is normal  No respiratory distress  Breath sounds: Normal breath sounds  Abdominal:      General: Bowel sounds are normal  There is no distension  Palpations: Abdomen is soft  Tenderness: There is no abdominal tenderness     Musculoskeletal: Normal range of motion  Skin:     General: Skin is warm and dry  Neurological:      General: No focal deficit present  Mental Status: She is alert and oriented to person, place, and time  Psychiatric:         Mood and Affect: Mood is anxious  Behavior: Behavior is agitated and withdrawn  Additional Data:     Lab Results: I have personally reviewed pertinent reports  Results from last 7 days   Lab Units 10/22/20  0928   WBC Thousand/uL 5 64   HEMOGLOBIN g/dL 11 6   HEMATOCRIT % 37 0   PLATELETS Thousands/uL 410*   NEUTROS PCT % 50   LYMPHS PCT % 35   MONOS PCT % 7   EOS PCT % 7*     Results from last 7 days   Lab Units 10/22/20  0928   SODIUM mmol/L 140   POTASSIUM mmol/L 4 0   CHLORIDE mmol/L 108   CO2 mmol/L 27   BUN mg/dL 6   CREATININE mg/dL 0 54*   ANION GAP mmol/L 5   CALCIUM mg/dL 9 8   ALBUMIN g/dL 3 7   TOTAL BILIRUBIN mg/dL 0 32   ALK PHOS U/L 124*   ALT U/L 29   AST U/L 27   GLUCOSE RANDOM mg/dL 97                 Results from last 7 days   Lab Units 10/22/20  0929   PROCALCITONIN ng/ml <0 05       Imaging: I have personally reviewed pertinent reports  XR chest pa & lateral   Final Result by Gurdeep Sanchez MD (10/22 7642)      No acute cardiopulmonary disease  Workstation performed: [id number]             EKG, Pathology, and Other Studies Reviewed on Admission:   · EKG: Abnormal; nonspecific, inverted T-waves in V2 and V3    Allscripts / Epic Records Reviewed: Yes     ** Please Note: This note has been constructed using a voice recognition system   ** None known

## 2020-10-22 NOTE — ASSESSMENT & PLAN NOTE
· TVR performed 9/10/20 for acute bacterial endocarditis (MSSA bacteremia, staph aureus)   · History of IVDA   · Sent home with cefadroxil antibiotics but did not complete course  · Started using IV drugs again; heroine, cocaine, benzodiazepines  · Continue 5 mg eliquis, 800 mg ibuprofen, 81 mg ASA, 25 mg metoprolol  · Continue 500 mg ascorbic acid, 667 mg calcium acetate, 1000 units vitamin 3,  325 mg ferrous sulfate  · ID consulted

## 2020-10-22 NOTE — ED PROVIDER NOTES
History  Chief Complaint   Patient presents with    Post-op Problem     Patient reports she had heart surgery in Sept and she thinks she has an infection in her heart     51-year-old female history of substance abuse and endocarditis status post tricuspid valve repair on 09/10/2020 requiring hospitalization with IV antibiotics presenting concern for recurrent infection  Patient stated that after leaving the hospital she relapsed with IV heroin and benzos and alcohol after the death of her boyfriend  Patient stated that she was recently at [facility] and she was reportedly diagnosed with recurrent infection concerning for endocarditis  Patient noted frustration with the hospital staff and repeated unsuccessful IV sticks so she left and came here where she had her heart surgery performed  Patient currently complaining chest pain that is substernal in nature and radiates to her left chest and has been constant for the past 3 days  Nonpleuritic  Patient also complaining of general malaise as well as diffuse abdominal pain that she describes as a cramping as well as some nausea  She reports last using heroin 2 days ago  Patient also complaining of some dysuria  She denies any other complaints at this time  She denies any headache, vision changes, shortness of breath, vomiting, fever/chills, or any bowel changes  Prior to Admission Medications   Prescriptions Last Dose Informant Patient Reported? Taking? DULoxetine (CYMBALTA) 60 mg delayed release capsule Past Month at Unknown time  No Yes   Sig: Take 1 capsule (60 mg total) by mouth daily   OLANZapine (ZyPREXA) 5 mg tablet Past Month at Unknown time  No Yes   Sig: Take 1 tablet (5 mg total) by mouth daily at bedtime   apixaban (ELIQUIS) 5 mg Past Month at Unknown time  No Yes   Sig: Take 2 tablets (10mg) BID through AM 10/4 then 1 tablet (5mg) BID for total of 3 months     ascorbic acid (VITAMIN C) 500 MG tablet Past Month at Unknown time  No Yes Sig: Take 1 tablet (500 mg total) by mouth daily   aspirin 81 mg chewable tablet Past Month at Unknown time  No Yes   Sig: Chew 1 tablet (81 mg total) daily   calcium acetate (PHOSLO) 667 mg capsule Past Month at Unknown time  No Yes   Sig: Take 1 capsule (667 mg total) by mouth 3 (three) times a day with meals   cefadroxil (DURICEF) 500 mg capsule Past Month at Unknown time  No Yes   Sig: Take 1 capsule (500 mg total) by mouth every 12 (twelve) hours   cholecalciferol (VITAMIN D3) 1,000 units tablet Past Month at Unknown time  No Yes   Sig: Take 1 tablet (1,000 Units total) by mouth daily   docusate sodium (COLACE) 100 mg capsule Past Month at Unknown time  No Yes   Sig: Take 1 capsule (100 mg total) by mouth 2 (two) times a day Hold for soft stools     ferrous sulfate 325 (65 Fe) mg tablet Past Month at Unknown time  No Yes   Sig: Take 1 tablet (325 mg total) by mouth daily with breakfast   ibuprofen (MOTRIN) 800 mg tablet   No No   Sig: Take 1 tablet (800 mg total) by mouth every 8 (eight) hours for 14 days   lidocaine (LIDODERM) 5 %   No No   Sig: Apply 2 patches topically daily for 14 days Remove & Discard patch within 12 hours or as directed by MD   magnesium oxide (MAG-OX) 400 mg Past Month at Unknown time  No Yes   Sig: Take 1 tablet (400 mg total) by mouth 2 (two) times a day   methocarbamol (ROBAXIN) 750 mg tablet   No No   Sig: Take 1 tablet (750 mg total) by mouth every 8 (eight) hours for 14 days   metoprolol tartrate (LOPRESSOR) 25 mg tablet Past Month at Unknown time  No Yes   Sig: Take 0 5 tablets (12 5 mg total) by mouth every 12 (twelve) hours   mirtazapine (REMERON) 30 mg tablet Past Month at Unknown time  No Yes   Sig: Take 1 tablet (30 mg total) by mouth daily at bedtime   oxyCODONE (ROXICODONE) 5 mg immediate release tablet Past Month at Unknown time  No Yes   Sig: Take 1 tablet (5 mg total) by mouth every 4 (four) hours as needed for severe pain for up to 5 dosesMax Daily Amount: 30 mg pantoprazole (PROTONIX) 40 mg tablet Past Month at Unknown time  No Yes   Sig: Take 1 tablet (40 mg total) by mouth daily   polyethylene glycol (MIRALAX) 17 g packet Past Month at Unknown time  No Yes   Sig: Take 17 g by mouth daily Hold for soft stools  saccharomyces boulardii (FLORASTOR) 250 mg capsule Past Month at Unknown time  No Yes   Sig: Take 1 capsule (250 mg total) by mouth 2 (two) times a day Hold for soft stools  senna (SENOKOT) 8 6 mg Past Month at Unknown time  No Yes   Sig: Take 1 tablet (8 6 mg total) by mouth 2 (two) times a day Hold for soft stools  Facility-Administered Medications: None       Past Medical History:   Diagnosis Date    Abnormal Pap smear of cervix     Anxiety     Depression     Endocarditis     2018    Hepatitis C     HPV (human papilloma virus) anogenital infection        Past Surgical History:   Procedure Laterality Date    IR PICC LINE PLACEMENT DOUBLE LUMEN  8/26/2020    KNEE SURGERY Left     MOUTH SURGERY      DC REPLACE TRICUSPID W CP BYPASS N/A 9/10/2020    Procedure: REPAIR VALVE TRICUSPID with Med[employer]onic 30mm 3D Contour  Annuloplasty Ring;  Surgeon: Dorina Carpio MD;  [facility];  Service: Cardiac Surgery    TOOTH EXTRACTION N/A 9/7/2020    Procedure: EXTRACTION TOOTH 32;  Surgeon: Nakul Hernandez DMD;  [facility]: [facility];  Service: Maxillofacial       History reviewed  No pertinent family history  I have reviewed and agree with the history as documented      E-Cigarette/Vaping    E-Cigarette Use Never User      E-Cigarette/Vaping Substances     Social History     Tobacco Use    Smoking status: Former Smoker     Packs/day: 0 50     Types: Cigarettes     Last attempt to quit: 11/9/2016     Years since quitting: 3 9    Smokeless tobacco: Never Used   Substance Use Topics    Alcohol use: Not Currently     Alcohol/week: 0 0 standard drinks     Frequency: Never     Binge frequency: Never    Drug use: Yes     Types: Heroin, Marijuana, Benzodiazepines, Cocaine     Comment: last Heroin use in September; used "medical marijuana"         Review of Systems   Constitutional: Negative for appetite change, chills, diaphoresis, fever and unexpected weight change  HENT: Negative for congestion and rhinorrhea  Eyes: Negative for photophobia and visual disturbance  Respiratory: Negative for cough, chest tightness and shortness of breath  Cardiovascular: Positive for chest pain  Negative for palpitations and leg swelling  Gastrointestinal: Positive for abdominal pain and nausea  Negative for abdominal distention, blood in stool, constipation, diarrhea and vomiting  Genitourinary: Positive for dysuria  Negative for hematuria  Musculoskeletal: Positive for myalgias  Negative for back pain, joint swelling, neck pain and neck stiffness  Skin: Negative for color change, pallor, rash and wound  Neurological: Negative for dizziness, syncope, weakness, light-headedness and headaches  Psychiatric/Behavioral: Negative for agitation  All other systems reviewed and are negative  Physical Exam  ED Triage Vitals [10/21/20 2304]   Temperature Pulse Respirations Blood Pressure SpO2   98 6 °F (37 °C) 83 18 128/84 96 %      Temp Source Heart Rate Source Patient Position - Orthostatic VS BP Location FiO2 (%)   Oral Monitor Sitting Left arm --      Pain Score       7             Orthostatic Vital Signs  Vitals:    10/22/20 1120 10/22/20 1819 10/22/20 2112 10/23/20 0521   BP: 120/63 120/75 117/72 120/68   Pulse: 63 69     Patient Position - Orthostatic VS: Lying          Physical Exam  Vitals signs and nursing note reviewed  Constitutional:       Appearance: She is well-developed  She is not diaphoretic  HENT:      Head: Normocephalic and atraumatic  Nose: Nose normal    Eyes:      General:         Right eye: No discharge  Left eye: No discharge  Pupils: Pupils are equal, round, and reactive to light     Neck: Musculoskeletal: Normal range of motion and neck supple  No neck rigidity or muscular tenderness  Vascular: No JVD  Trachea: No tracheal deviation  Cardiovascular:      Rate and Rhythm: Normal rate and regular rhythm  Heart sounds: Murmur present  No friction rub  No gallop  Comments: Systolic murmur  Pulmonary:      Effort: Pulmonary effort is normal  No respiratory distress  Breath sounds: No stridor  Wheezing present  No rales  Comments: Mild end-expiratory wheezing bibasilarly  Chest:      Chest wall: No tenderness  Abdominal:      General: Bowel sounds are normal  There is no distension  Palpations: Abdomen is soft  Tenderness: There is no abdominal tenderness  There is no guarding or rebound  Musculoskeletal: Normal range of motion  General: No tenderness or deformity  Lymphadenopathy:      Cervical: No cervical adenopathy  Skin:     General: Skin is warm and dry  Coloration: Skin is not pale  Findings: No erythema or rash  Neurological:      General: No focal deficit present  Mental Status: She is alert and oriented to person, place, and time  Mental status is at baseline  Cranial Nerves: No cranial nerve deficit  Sensory: No sensory deficit  Motor: No weakness or abnormal muscle tone  Coordination: Coordination normal    Psychiatric:         Behavior: Behavior normal          Thought Content:  Thought content normal          ED Medications  Medications   apixaban (ELIQUIS) tablet 5 mg (5 mg Oral Given 10/22/20 1958)   ascorbic acid (VITAMIN C) tablet 500 mg (has no administration in time range)   aspirin chewable tablet 81 mg (has no administration in time range)   calcium acetate (PHOSLO) capsule 667 mg (667 mg Oral Given 10/22/20 1958)   cholecalciferol (VITAMIN D3) tablet 1,000 Units (has no administration in time range)   docusate sodium (COLACE) capsule 100 mg (100 mg Oral Not Given 10/22/20 1830)   DULoxetine (CYMBALTA) delayed release capsule 60 mg (has no administration in time range)   ferrous sulfate tablet 325 mg (has no administration in time range)   ibuprofen (MOTRIN) tablet 800 mg (800 mg Oral Given 10/23/20 0519)   magnesium oxide (MAG-OX) tablet 400 mg (400 mg Oral Given 10/22/20 1959)   metoprolol tartrate (LOPRESSOR) partial tablet 12 5 mg (12 5 mg Oral Given 10/22/20 2110)   methocarbamol (ROBAXIN) tablet 750 mg (750 mg Oral Given 10/23/20 0520)   mirtazapine (REMERON) tablet 30 mg (30 mg Oral Given 10/22/20 2110)   OLANZapine (ZyPREXA) tablet 5 mg (5 mg Oral Given 10/22/20 2110)   pantoprazole (PROTONIX) EC tablet 40 mg (has no administration in time range)   saccharomyces boulardii (FLORASTOR) capsule 250 mg (250 mg Oral Given 10/22/20 1958)   senna (SENOKOT) tablet 8 6 mg (8 6 mg Oral Not Given 10/22/20 1830)   acetaminophen (TYLENOL) tablet 650 mg (650 mg Oral Given 10/22/20 2101)   aluminum-magnesium hydroxide-simethicone (MYLANTA) oral suspension 30 mL (has no administration in time range)   cloNIDine (CATAPRES) tablet 0 1 mg (0 1 mg Oral Given 10/23/20 0520)   acetaminophen (TYLENOL) tablet 975 mg (975 mg Oral Given 10/23/20 0519)   gabapentin (NEURONTIN) capsule 300 mg (300 mg Oral Given 10/22/20 2110)   loperamide (IMODIUM) capsule 4 mg (has no administration in time range)   melatonin tablet 3 mg (3 mg Oral Given 10/22/20 2110)   hydrOXYzine HCL (ATARAX) tablet 25 mg (25 mg Oral Given 10/22/20 2100)   buprenorphine-naloxone (SUBOXONE) 8-2 mg per SL film 1 Film (1 Film Sublingual Not Given 10/22/20 1512)   ondansetron (ZOFRAN-ODT) dispersible tablet 4 mg (has no administration in time range)   LORazepam (ATIVAN) tablet 1 mg (1 mg Oral Given 10/23/20 0520)   lidocaine (LMX) 4 % cream (1 application Topical Given 10/22/20 0123)   buprenorphine-naloxone (SUBOXONE) 8-2 mg per SL film 1 Film (1 Film Sublingual Given 10/22/20 1118)       Diagnostic Studies  Results Reviewed     Procedure Component Value Units Date/Time    POCT pregnancy, urine [[id number]]  (Normal) Resulted:  10/22/20 1121    Lab Status:  Final result Updated:  10/22/20 1122     EXT PREG TEST UR (Ref: Negative) Negative     Control Valid    Procalcitonin with AM Reflex [[id number]]  (Normal) Collected:  10/22/20 0929    Lab Status:  Final result Specimen:  Blood from Arm, Right Updated:  10/22/20 1056     Procalcitonin <0 05 ng/ml     Procalcitonin Reflex [[id number]]     Lab Status:  No result Specimen:  Blood     Comprehensive metabolic panel [[id number]]  (Abnormal) Collected:  10/22/20 0928    Lab Status:  Final result Specimen:  Blood from Arm, Right Updated:  10/22/20 0952     Sodium 140 mmol/L      Potassium 4 0 mmol/L      Chloride 108 mmol/L      CO2 27 mmol/L      ANION GAP 5 mmol/L      BUN 6 mg/dL      Creatinine 0 54 mg/dL      Glucose 97 mg/dL      Calcium 9 8 mg/dL      AST 27 U/L      ALT 29 U/L      Alkaline Phosphatase 124 U/L      Total Protein 8 1 g/dL      Albumin 3 7 g/dL      Total Bilirubin 0 32 mg/dL      eGFR 130 ml/min/1 73sq m     Narrative:       [place] guidelines for Chronic Kidney Disease (CKD):     Stage 1 with normal or high GFR (GFR > 90 mL/min/1 73 square meters)    Stage 2 Mild CKD (GFR = 60-89 mL/min/1 73 square meters)    Stage 3A Moderate CKD (GFR = 45-59 mL/min/1 73 square meters)    Stage 3B Moderate CKD (GFR = 30-44 mL/min/1 73 square meters)    Stage 4 Severe CKD (GFR = 15-29 mL/min/1 73 square meters)    Stage 5 End Stage CKD (GFR <15 mL/min/1 73 square meters)  Note: GFR calculation is accurate only with a steady state creatinine    C-reactive protein [[id number]]  (Abnormal) Collected:  10/22/20 0928    Lab Status:  Final result Specimen:  Blood from Arm, Right Updated:  10/22/20 0952     CRP 43 0 mg/L     Sedimentation rate, automated [[id number]]  (Abnormal) Collected:  10/22/20 0929    Lab Status:  Final result Specimen:  Blood from Arm, Right Updated:  10/22/20 7877 Sed Rate 34 mm/hour     Narrative:       New method- Test performed using  automated Rheology Technology  If following a patient's inflammatory disease during treatment, a new baseline should be established  CBC and differential [[id number]]  (Abnormal) Collected:  10/22/20 0928    Lab Status:  Final result Specimen:  Blood from Arm, Right Updated:  10/22/20 0939     WBC 5 64 Thousand/uL      RBC 4 42 Million/uL      Hemoglobin 11 6 g/dL      Hematocrit 37 0 %      MCV 84 fL      MCH 26 2 pg      MCHC 31 4 g/dL      RDW 13 3 %      MPV 8 2 fL      Platelets 474 Thousands/uL      nRBC 0 /100 WBCs      Neutrophils Relative 50 %      Immat GRANS % 0 %      Lymphocytes Relative 35 %      Monocytes Relative 7 %      Eosinophils Relative 7 %      Basophils Relative 1 %      Neutrophils Absolute 2 81 Thousands/µL      Immature Grans Absolute 0 02 Thousand/uL      Lymphocytes Absolute 1 99 Thousands/µL      Monocytes Absolute 0 39 Thousand/µL      Eosinophils Absolute 0 39 Thousand/µL      Basophils Absolute 0 04 Thousands/µL     Blood culture [[id number]]     Lab Status:  No result Specimen:  Blood     Lactic acid [[id number]]     Lab Status:  No result Specimen:  Blood     Protime-INR [[id number]]     Lab Status:  No result Specimen:  Blood     APTT [[id number]]     Lab Status:  No result Specimen:  Blood     Blood culture #1 [[id number]]     Lab Status:  No result Specimen:  Blood     Blood culture #2 [[id number]]     Lab Status:  No result Specimen:  Blood     Urinalysis with microscopic [[id number]]     Lab Status:  No result Specimen:  Urine     Troponin I [[id number]]     Lab Status:  No result Specimen:  Blood                  XR chest pa & lateral   Final Result by Leticia Reynolds MD (10/22 7500)      No acute cardiopulmonary disease              Workstation performed: [id number]               Procedures  ECG 12 Lead Documentation Only    Date/Time: 10/22/2020 1:15 AM  Performed by: Jayjay Thompson MD  Authorized by: Shadi Monaco Trev Ge MD     Indications / Diagnosis:  CP  ECG reviewed by me, the ED Provider: yes    Patient location:  ED  Previous ECG:     Previous ECG:  Compared to current    Comparison ECG info:  Twaves now evident in anterior leads    Similarity:  Changes noted    Comparison to cardiac monitor: Yes    Interpretation:     Interpretation: abnormal    Rate:     ECG rate:  62    ECG rate assessment: normal    Rhythm:     Rhythm: sinus rhythm    Ectopy:     Ectopy: none    QRS:     QRS axis:  Normal    QRS intervals:  Normal  Conduction:     Conduction: normal    ST segments:     ST segments:  Normal  T waves:     T waves: non-specific and inverted      Inverted:  V2 and V3          ED Course  ED Course as of Oct 23 0549   Thu Oct 22, 2020   0554 Discharge here after 6 weeks course ABX for endocarditis on 9/27 s/p tricuspid valve repair  Reported recurrent infection after IVDU relapse diagnosed at [facility] past 2 days  Left AMA  Refusing blood work here  Amenable to PICC line and IV antibiotics  9020 [facility] to accept admission if [address] records support infection  Records requested  Request faxed to [phone]              HEART Risk Score      Most Recent Value   Heart Score Risk Calculator   History  0 Filed at: 10/22/2020 1018   ECG  1 Filed at: 10/22/2020 1018   Age  0 Filed at: 10/22/2020 1018   Risk Factors  0 Filed at: 10/22/2020 1018   Troponin  0 Filed at: 10/22/2020 1018   HEART Score  1 Filed at: 10/22/2020 1018                      SBIRT 20yo+      Most Recent Value   SBIRT (23 yo +)   In order to provide better care to our patients, we are screening all of our patients for alcohol and drug use  Would it be okay to ask you these screening questions?   No Filed at: 10/22/2020 0914                  MDM  Number of Diagnoses or Management Options  Chest pain:   Generalized abdominal pain:   Nausea:   Opioid withdrawal [facility]):   Subjective fever:   Diagnosis management comments: 57-year-old female history of substance abuse and endocarditis status post tricuspid valve repair on 09/10/2020 requiring hospitalization with IV antibiotics presenting concern for recurrent infection  Patient with chest pain and recurrent IV drug use after endocarditis treatment given subjective fevers and chills, concerning for possible repeat endocarditis especially in setting of reported diagnosis at outside hospital   Plan to perform infectious workup including 3 blood cultures to assess for possible endocarditis  Cardiac eval including EKG and chest x-ray  Reassess  Patient refusing all blood work in the ED from peripheral sticks stating that she will only be amenable to a PICC line  Attempted to discuss and convey importance of blood testing and potential delay of care by refusing blood work at this time with risks and possible consequences including but not limited to permanent cardiac injury, severe infection causing organ dysfunction, or even death  Patient acknowledged understanding of risks and possible consequences but continued to refuse peripheral blood draw  Plan to attempt to touch base with Infectious Disease for additional recommendations and to obtain outside records  Patient signed consent for records  Called facility and sent record request to hospital supervisor given that it was the middle the night and medical records was closed  Awaiting records  [facility] aware  Repeatedly encouraged patient to consent to blood draw throughout the night but patient continued to refuse  Signed out  EKG NSR with anterior t-wave inversions         Amount and/or Complexity of Data Reviewed  Clinical lab tests: ordered and reviewed  Tests in the radiology section of CPT®: ordered and reviewed  Tests in the medicine section of CPT®: ordered and reviewed  Review and summarize past medical records: yes  Independent visualization of images, tracings, or specimens: yes    Risk of Complications, Morbidity, and/or Mortality  Presenting problems: moderate  Diagnostic procedures: low  Management options: moderate    Patient Progress  Patient progress: stable        Disposition  Final diagnoses:   Chest pain   Generalized abdominal pain   Nausea   Subjective fever   Opioid withdrawal ([facility])     Time reflects when diagnosis was documented in both MDM as applicable and the Disposition within this note     Time User Action Codes Description Comment    10/22/2020  6:12 AM Booker Kg Add [R07 9] Chest pain     10/22/2020  6:12 AM Booker Kg Add [R10 84] Generalized abdominal pain     10/22/2020  6:12 AM Booker Kg Add [R11 0] Nausea     10/22/2020  6:13 AM Booker Kg Add [R50 9] Subjective fever     10/22/2020 10:54 AM Rondal Purdue Add [F11 23] Opioid withdrawal [facility])       ED Disposition     ED Disposition Condition Date/Time Comment    Admit Stable Thu Oct 22, 2020 10:54 AM Case was discussed with Dr Alejo Mariano and the patient's admission status was agreed to be Admission Status: inpatient status to the service of Dr Alejo Mariano   Follow-up Information     Follow up With Specialties Details Why Contact Info Additional Information    Jason Peters PA-C Physician Assistant Schedule an appointment as soon as possible for a visit in 1 week  [address] Emergency Department Emergency Medicine Go to  If symptoms worsen [address]  [phone]  ED, [address] [place] [address], [address]   [phone]          Current Discharge Medication List      CONTINUE these medications which have NOT CHANGED    Details   apixaban (ELIQUIS) 5 mg Take 2 tablets (10mg) BID through AM 10/4 then 1 tablet (5mg) BID for total of 3 months    Qty: 30 tablet, Refills: 2    Associated Diagnoses: S/P TVR (tricuspid valve repair)      ascorbic acid (VITAMIN C) 500 MG tablet Take 1 tablet (500 mg total) by mouth daily  Qty: 90 tablet, Refills: 0    Associated Diagnoses: S/P TVR (tricuspid valve repair)      aspirin 81 mg chewable tablet Chew 1 tablet (81 mg total) daily  Qty: 30 tablet, Refills: 2    Associated Diagnoses: S/P TVR (tricuspid valve repair)      calcium acetate (PHOSLO) 667 mg capsule Take 1 capsule (667 mg total) by mouth 3 (three) times a day with meals  Qty: 30 capsule, Refills: 0    Associated Diagnoses: S/P TVR (tricuspid valve repair)      cefadroxil (DURICEF) 500 mg capsule Take 1 capsule (500 mg total) by mouth every 12 (twelve) hours  Qty: 60 capsule, Refills: 0    Associated Diagnoses: S/P TVR (tricuspid valve repair)      cholecalciferol (VITAMIN D3) 1,000 units tablet Take 1 tablet (1,000 Units total) by mouth daily  Qty: 30 tablet, Refills: 0    Associated Diagnoses: S/P TVR (tricuspid valve repair)      docusate sodium (COLACE) 100 mg capsule Take 1 capsule (100 mg total) by mouth 2 (two) times a day Hold for soft stools    Qty: 60 capsule, Refills: 0    Associated Diagnoses: S/P TVR (tricuspid valve repair)      DULoxetine (CYMBALTA) 60 mg delayed release capsule Take 1 capsule (60 mg total) by mouth daily  Qty: 30 capsule, Refills: 0    Associated Diagnoses: S/P TVR (tricuspid valve repair)      ferrous sulfate 325 (65 Fe) mg tablet Take 1 tablet (325 mg total) by mouth daily with breakfast  Qty: 90 tablet, Refills: 0    Associated Diagnoses: S/P TVR (tricuspid valve repair)      magnesium oxide (MAG-OX) 400 mg Take 1 tablet (400 mg total) by mouth 2 (two) times a day  Qty: 60 each, Refills: 0    Associated Diagnoses: S/P TVR (tricuspid valve repair)      metoprolol tartrate (LOPRESSOR) 25 mg tablet Take 0 5 tablets (12 5 mg total) by mouth every 12 (twelve) hours  Qty: 30 tablet, Refills: 2    Associated Diagnoses: S/P TVR (tricuspid valve repair)      mirtazapine (REMERON) 30 mg tablet Take 1 tablet (30 mg total) by mouth daily at bedtime  Qty: 30 tablet, Refills: 0    Associated Diagnoses: S/P TVR (tricuspid valve repair)      OLANZapine (ZyPREXA) 5 mg tablet Take 1 tablet (5 mg total) by mouth daily at bedtime  Qty: 30 tablet, Refills: 0    Associated Diagnoses: S/P TVR (tricuspid valve repair)      oxyCODONE (ROXICODONE) 5 mg immediate release tablet Take 1 tablet (5 mg total) by mouth every 4 (four) hours as needed for severe pain for up to 5 dosesMax Daily Amount: 30 mg  Qty: 5 tablet, Refills: 0    Comments: Continuing inpatient therapy  Associated Diagnoses: S/P TVR (tricuspid valve repair)      pantoprazole (PROTONIX) 40 mg tablet Take 1 tablet (40 mg total) by mouth daily  Qty: 30 tablet, Refills: 0    Associated Diagnoses: S/P TVR (tricuspid valve repair)      polyethylene glycol (MIRALAX) 17 g packet Take 17 g by mouth daily Hold for soft stools  Qty: 30 each, Refills: 0    Associated Diagnoses: S/P TVR (tricuspid valve repair)      saccharomyces boulardii (FLORASTOR) 250 mg capsule Take 1 capsule (250 mg total) by mouth 2 (two) times a day Hold for soft stools  Qty: 60 capsule, Refills: 0    Associated Diagnoses: S/P TVR (tricuspid valve repair)      senna (SENOKOT) 8 6 mg Take 1 tablet (8 6 mg total) by mouth 2 (two) times a day Hold for soft stools  Qty: 60 each, Refills: 0    Associated Diagnoses: S/P TVR (tricuspid valve repair)      ibuprofen (MOTRIN) 800 mg tablet Take 1 tablet (800 mg total) by mouth every 8 (eight) hours for 14 days  Qty: 42 tablet, Refills: 0    Associated Diagnoses: S/P TVR (tricuspid valve repair)      lidocaine (LIDODERM) 5 % Apply 2 patches topically daily for 14 days Remove & Discard patch within 12 hours or as directed by MD  Qty: 28 patch, Refills: 0    Associated Diagnoses: S/P TVR (tricuspid valve repair)      methocarbamol (ROBAXIN) 750 mg tablet Take 1 tablet (750 mg total) by mouth every 8 (eight) hours for 14 days  Qty: 42 tablet, Refills: 0    Associated Diagnoses: S/P TVR (tricuspid valve repair)           No discharge procedures on file      PDMP Review       Value Time User    PDMP Reviewed  Yes 10/22/2020  3:29 PM Diana Pardo D&#39;MILLIE Carvajal           ED Provider  Attending physically available and evaluated Retasa Mantillakathryn VERA managed the patient along with the ED Attending      Electronically Signed by         Main Adkins MD  10/23/20 3351

## 2020-10-22 NOTE — ED NOTES
Spoke with Dr Raad Bolanos  He was unable to get IV using ultrasound       Mimi Richardson RN  10/22/20 0330       Mimi Richardson RN  10/22/20 0339

## 2020-10-22 NOTE — ED NOTES
Pt took suboxone film with wet finger and tablet melted on patients finger   Pt states " fuck this I need a new one and wiped the remaining film on the bed sheets"      Evita Newton RN  10/22/20 8095

## 2020-10-22 NOTE — ASSESSMENT & PLAN NOTE
· Patient has history of IV drug abuse; reports most recently using IV heroin, cocaine, and benzodiazepines   · Presents today reporting symptoms on withdrawal from the weekend: tremors, chest pain, fevers, chills, diaphoresis  · Given Suboxone in the ED   · Acute Pain management consulted for management of withdrawals symptoms   · Evaluated with a COWS scale of 20 after 8 mg suboxone, another 4 mg soboxone given  · Start clonidine 0 1 TID, Tylenol 975 mg Q8H, gabapentin 300 HS, melatonin 3 mg HS  · Start zofran prn, loperamide prn, Hydroxyzne 50 mg Q6H prn agitation and anxiety   · Continue 60 mg cymbalta, 30 kg remeron, 5 mg zyprexa, ibuprofen 800 mg, robaxin 750 mg  · Start and continue 1 mg IV Ativan prn Q6H   · CBC, CMP, urinalysis   · PT and OT eval

## 2020-10-22 NOTE — ED CARE HANDOFF
Emergency Department Sign Out Note        Sign out and transfer of care from Dr Cherelle Clement  See Separate Emergency Department note  The patient, Christen Gary, was evaluated by the previous provider for chest pain, recent diagnosis of endocarditis  Workup Completed:  EKG, chest xray    ED Course / Workup Pending (followup): Lab testing    In brief this is a 33-year-old woman recently treated for infective endocarditis of the tricuspid valve, including undergoing valve repair/ replacement procedure  Patient stated that she was seen at another hospital [facility]) 2 days ago and had been admitted due to concern for recurrent endocarditis  However we could not initially obtain records  EKG, lab work, and chest x-ray ordered  However the patient refused any lab draws or IV placement, and stated that she just wanted a PICC line placed a and to be admitted  Overnight team discussed with infectious disease who recommended that the patient would not need to be admitted unless she met SIRS criteria, or if recent hospital records did show workup consistent with endocarditis  I was finally able to obtain records from [facility] and reviewed these  The patient was in fact admitted there 2 days ago and ultimately signed out AMA  Blood cultures that were drawn at that time have resulted negative/ no growth at this point  WBC count was 11 2  No other major acute abnormalities noted  I discussed these results with the patient, and discussed with her that at this point I cannot just admit her without further workup  She is now agreeable to having blood drawn in the emergency department and I will follow-up on these results  ADDENDUM:  Patient has elevated ESR and CRP  Discussed with SLIM and patient will be admitted for further evaluation and treatment  Patient also complaining of opioid withdrawal symptoms  Initial COWS score is 8    I am administering Subutex 8/2 mg now and will then re-evaluate with further dosing given if needed  FURTHER ADDENDUM:  Patient destroyed her second dose of Suboxone as noted in ED Course  I will not give her further dosing in the emergency department  HEART Risk Score      Most Recent Value   Heart Score Risk Calculator   History  0 Filed at: 10/22/2020 1018   ECG  1 Filed at: 10/22/2020 1018   Age  0 Filed at: 10/22/2020 1018   Risk Factors  0 Filed at: 10/22/2020 1018   Troponin  0 Filed at: 10/22/2020 1018   HEART Score  1 Filed at: 10/22/2020 1018                          ED Course as of Oct 22 1550   Thu Oct 22, 2020   0810 Records not yet received  Called facility and spoke with medical records  Re-faxing form to [phone] Patient complaining of opioid withdrawal   Admits to recent opioid use with last use in the past day  Initial COWS score is 8  I did offer to initiate treatment with Suboxone and the patient is amenable to this  Will give 8 mg and reassess  1305 On re-evaluation COWS score now 10  Giving an additional 8 mg of Suboxone  1546 Patient evidently refused the 2 mg dose of Suboxone from pain management  I had initially ordered a second dose of 8 mg which I then re-ordered  Upon receiving the dose, the patient wadded it up, wiped it on the bed, and then asked the nurse for a different dose  Will not give the patient further Suboxone here          Procedures  MDM    Disposition  Final diagnoses:   Chest pain   Generalized abdominal pain   Nausea   Subjective fever   Opioid withdrawal ([address] )     Time reflects when diagnosis was documented in both MDM as applicable and the Disposition within this note     Time User Action Codes Description Comment    10/22/2020  6:12 AM Kennis [place] Add [R07 9] Chest pain     10/22/2020  6:12 AM Kennis [place] Add [R10 84] Generalized abdominal pain     10/22/2020  6:12 AM Kennis [place] Add [R11 0] Nausea     10/22/2020  6:13 AM Kennis [place] Add [R50 9] Subjective fever     10/22/2020 10:54 AM Clair WARREN Add [F11 23] Opioid withdrawal [facility])       ED Disposition     ED Disposition Condition Date/Time Comment    Admit Stable Thu Oct 22, 2020 10:54 AM Case was discussed with Dr Orly Potts and the patient's admission status was agreed to be Admission Status: inpatient status to the service of Dr Orly Potts   Follow-up Information     Follow up With Specialties Details Why Contact Info Additional Information    Kiet Cisneros PA-C Physician Assistant Schedule an appointment as soon as possible for a visit in 1 week  [address] [place] [address] Emergency Department Emergency Medicine Go to  If symptoms worsen [address] [place] [address], [address] [place] [address], [address] [phone]        Patient's Medications   Discharge Prescriptions    No medications on file     No discharge procedures on file         ED Provider  Electronically Signed by     Chrissy De La Torre MD  10/22/20 9283

## 2020-10-22 NOTE — ED NOTES
When entering pt room to assist with IV start, pt refused at this time stating "just give me a minute my belly really hurts", pt refusing warm pack to be placed on her arm as well     Bharat , RN  10/22/20 9867

## 2020-10-22 NOTE — ASSESSMENT & PLAN NOTE
· Continue cymbalta 60 mg, remeron 30 mg, zyprexa 5 mg   · Start IV ativan 1 mg PRN  · Patient has known history of signing out AMA and refusing care

## 2020-10-23 LAB
ATRIAL RATE: 62 BPM
P AXIS: 60 DEGREES
PR INTERVAL: 126 MS
QRS AXIS: 82 DEGREES
QRSD INTERVAL: 80 MS
QT INTERVAL: 404 MS
QTC INTERVAL: 410 MS
T WAVE AXIS: 72 DEGREES
VENTRICULAR RATE: 62 BPM

## 2020-10-23 PROCEDURE — 99232 SBSQ HOSP IP/OBS MODERATE 35: CPT | Performed by: PHYSICIAN ASSISTANT

## 2020-10-23 PROCEDURE — 93010 ELECTROCARDIOGRAM REPORT: CPT | Performed by: INTERNAL MEDICINE

## 2020-10-23 PROCEDURE — NC001 PR NO CHARGE: Performed by: FAMILY MEDICINE

## 2020-10-23 PROCEDURE — 99232 SBSQ HOSP IP/OBS MODERATE 35: CPT | Performed by: FAMILY MEDICINE

## 2020-10-23 PROCEDURE — NC001 PR NO CHARGE: Performed by: PHYSICIAN ASSISTANT

## 2020-10-23 RX ORDER — BUPRENORPHINE AND NALOXONE 8; 2 MG/1; MG/1
1 FILM, SOLUBLE BUCCAL; SUBLINGUAL ONCE
Status: DISCONTINUED | OUTPATIENT
Start: 2020-10-23 | End: 2020-10-24

## 2020-10-23 RX ORDER — BUPRENORPHINE AND NALOXONE 8; 2 MG/1; MG/1
1 FILM, SOLUBLE BUCCAL; SUBLINGUAL DAILY
Status: DISCONTINUED | OUTPATIENT
Start: 2020-10-23 | End: 2020-10-23 | Stop reason: SDUPTHER

## 2020-10-23 RX ORDER — BUPRENORPHINE AND NALOXONE 8; 2 MG/1; MG/1
2 FILM, SOLUBLE BUCCAL; SUBLINGUAL DAILY
Status: DISCONTINUED | OUTPATIENT
Start: 2020-10-24 | End: 2020-10-24

## 2020-10-23 RX ORDER — BUPRENORPHINE AND NALOXONE 8; 2 MG/1; MG/1
1 FILM, SOLUBLE BUCCAL; SUBLINGUAL DAILY
Status: DISCONTINUED | OUTPATIENT
Start: 2020-10-23 | End: 2020-10-26

## 2020-10-23 RX ADMIN — GABAPENTIN 300 MG: 300 CAPSULE ORAL at 22:01

## 2020-10-23 RX ADMIN — CLONIDINE HYDROCHLORIDE 0.1 MG: 0.1 TABLET ORAL at 05:20

## 2020-10-23 RX ADMIN — ASPIRIN 81 MG CHEWABLE TABLET 81 MG: 81 TABLET CHEWABLE at 12:51

## 2020-10-23 RX ADMIN — APIXABAN 5 MG: 5 TABLET, FILM COATED ORAL at 12:32

## 2020-10-23 RX ADMIN — DULOXETINE HYDROCHLORIDE 60 MG: 60 CAPSULE, DELAYED RELEASE ORAL at 12:51

## 2020-10-23 RX ADMIN — ACETAMINOPHEN 975 MG: 325 TABLET, FILM COATED ORAL at 13:16

## 2020-10-23 RX ADMIN — OLANZAPINE 5 MG: 5 TABLET, FILM COATED ORAL at 22:02

## 2020-10-23 RX ADMIN — METHOCARBAMOL TABLETS 750 MG: 750 TABLET, COATED ORAL at 22:02

## 2020-10-23 RX ADMIN — LORAZEPAM 1 MG: 1 TABLET ORAL at 05:20

## 2020-10-23 RX ADMIN — ACETAMINOPHEN 975 MG: 325 TABLET, FILM COATED ORAL at 22:02

## 2020-10-23 RX ADMIN — MELATONIN 3 MG: at 22:02

## 2020-10-23 RX ADMIN — ACETAMINOPHEN 975 MG: 325 TABLET, FILM COATED ORAL at 05:19

## 2020-10-23 RX ADMIN — IBUPROFEN 800 MG: 400 TABLET ORAL at 05:19

## 2020-10-23 RX ADMIN — LORAZEPAM 1 MG: 1 TABLET ORAL at 13:33

## 2020-10-23 RX ADMIN — BUPRENORPHINE AND NALOXONE 1 FILM: 8; 2 FILM BUCCAL; SUBLINGUAL at 13:40

## 2020-10-23 RX ADMIN — IBUPROFEN 800 MG: 400 TABLET ORAL at 22:02

## 2020-10-23 RX ADMIN — METHOCARBAMOL TABLETS 750 MG: 750 TABLET, COATED ORAL at 05:20

## 2020-10-23 RX ADMIN — MIRTAZAPINE 30 MG: 30 TABLET, FILM COATED ORAL at 22:02

## 2020-10-23 NOTE — UTILIZATION REVIEW
Initial Clinical Review    Admission: Date/Time/Statement:   Admission Orders (From admission, onward)     Ordered        10/22/20 1054  Inpatient Admission  Once                   Orders Placed This Encounter   Procedures    Inpatient Admission     Standing Status:   Standing     Number of Occurrences:   1     Order Specific Question:   Admitting Physician     Answer:   Frances Bosch [[id number]]     Order Specific Question:   Level of Care     Answer:   Med Surg [[id number]]     Order Specific Question:   Estimated length of stay     Answer:   More than 2 Midnights     Order Specific Question:   Certification     Answer:   I certify that inpatient services are medically necessary for this patient for a duration of greater than two midnights  See H&P and MD Progress Notes for additional information about the patient's course of treatment  ED Arrival Information     Expected Arrival Acuity Means of Arrival Escorted By Service Admission Type    - 10/21/2020 22:50 Urgent Walk-In Self General Medicine Urgent    Arrival Complaint    Post Op Complication        Chief Complaint   Patient presents with    Post-op Problem     Patient reports she had heart surgery in Sept and she thinks she has an infection in her heart     Assessment/Plan: 34year old female, presented to the ED @ [facility] from somewhere via walk in  Admitted as Inpatient due to Heroin withdrawal   For the past 3 days c/o fever, chills, night sweats, hand tremors, diarrhea, and chest pain  Requesting a shower, had not showered in many days because she has been in "In [facility] getting high all weekend"  In ED, given suboxone  Consult Acute Pain: management - Evaluated with a COWS scale of 20 after 8 mg suboxone, another 4 mg soboxone given  Start clonidine 0 1 TID, Tylenol 975 mg Q8H, gabapentin 300 HS, melatonin 3 mg HS  Start zofran prn, loperamide prn, Hydroxyzne 50 mg Q6H prn agitation and anxiety    Continue 60 mg cymbalta, 30 kg remeron, 5 mg zyprexa, ibuprofen 800 mg, robaxin 750 mg  Start and continue 1 mg IV Ativan prn Q6H   CBC, CMP, urinalysis  Consult PT/OT  Consult ID  Start IV Abx  Follow-up blood cultures          ED Triage Vitals [10/21/20 2304]   Temperature Pulse Respirations Blood Pressure SpO2   98 6 °F (37 °C) 83 18 128/84 96 %      Temp Source Heart Rate Source Patient Position - Orthostatic VS BP Location FiO2 (%)   Oral Monitor Sitting Left arm --      Pain Score       7          Wt Readings from Last 1 Encounters:   10/21/20 70 3 kg (154 lb 15 7 oz)     Additional Vital Signs:   Date/Time   Temp   Pulse   Resp   BP   MAP (mmHg)   SpO2   O2 Device   Patient Position - Orthostatic VS    10/23/20 05:21:46            120/68   85             10/22/20 21:12:39            117/72   87             10/22/20 2002            [place]      None (Room air)       10/22/20 1825                     None (Room air)       10/22/20 18:19:55   98 1 °F (36 7 °C)   69   19   120/75   90   98 %          10/22/20 1120      63   14   120/63      98 %   None (Room air)   Lying    10/22/20 0909      59   14   122/66      98 %   None (Room air)   Lying    10/22/20 0730      68   11Abnormal                     10/22/20 0612      65   14   102/50      98 %   None (Room air)         10/22/2020 @  0803  Chest X:  No acute cardiopulmonary disease       10/22/2020 @ 0115  EC, NSR    Pertinent Labs/Diagnostic Test Results:     Results from last 7 days   Lab Units 10/22/20  0928   WBC Thousand/uL 5 64   HEMOGLOBIN g/dL 11 6   HEMATOCRIT % 37 0   PLATELETS Thousands/uL 410*   NEUTROS ABS Thousands/µL 2 81     Results from last 7 days   Lab Units 10/22/20  0928   SODIUM mmol/L 140   POTASSIUM mmol/L 4 0   CHLORIDE mmol/L 108   CO2 mmol/L 27   ANION GAP mmol/L 5   BUN mg/dL 6   CREATININE mg/dL 0 54*   EGFR ml/min/1 73sq m 130   CALCIUM mg/dL 9 8     Results from last 7 days   Lab Units 10/22/20  0928   AST U/L 27   ALT U/L 29   ALK PHOS U/L 124*   TOTAL PROTEIN g/dL 8 1   ALBUMIN g/dL 3 7   TOTAL BILIRUBIN mg/dL 0 32     Results from last 7 days   Lab Units 10/22/20  0928   GLUCOSE RANDOM mg/dL 97       Results from last 7 days   Lab Units 10/22/20  0929   PROCALCITONIN ng/ml <0 05     Results from last 7 days   Lab Units 10/22/20  0929 10/22/20  0928   CRP mg/L  --  43 0*   SED RATE mm/hour 34*  --      ED Treatment:   Medication Administration from 10/21/2020 2250 to 10/22/2020 1808       Date/Time Order Dose Route Action     10/22/2020 0123 lidocaine (LMX) 4 % cream 1 application Topical Given     10/22/2020 1118 buprenorphine-naloxone (SUBOXONE) 8-2 mg per SL film 1 Film 1 Film Sublingual Given        Past Medical History:   Diagnosis Date    Abnormal Pap smear of cervix     Anxiety     Depression     Endocarditis     2018    Hepatitis C     HPV (human papilloma virus) anogenital infection      Present on Admission:   Heroin withdrawal ([facility] )   Acute bacterial endocarditis   Bipolar 1 disorder ([facility] )      Admitting Diagnosis: Opioid withdrawal ([place] [address] ) [F11 23]  Nausea [R11 0]  Chest pain [R07 9]  Generalized abdominal pain [R10 84]  Post-operative complication [K44  9XXA]  Subjective fever [R50 9]  Age/Sex: 34 y o  female  Admission Orders:  Scheduled Medications:  acetaminophen, 975 mg, Oral, Q8H JEFF  apixaban, 5 mg, Oral, BID  ascorbic acid, 500 mg, Oral, Daily  aspirin, 81 mg, Oral, Daily  buprenorphine-naloxone, 1 Film, Sublingual, Once  buprenorphine-naloxone, 1 Film, Sublingual, Daily  calcium acetate, 667 mg, Oral, TID With Meals  cholecalciferol, 1,000 Units, Oral, Daily  cloNIDine, 0 1 mg, Oral, Q8H JEFF  docusate sodium, 100 mg, Oral, BID  DULoxetine, 60 mg, Oral, Daily  ferrous sulfate, 325 mg, Oral, Daily With Breakfast  gabapentin, 300 mg, Oral, HS  ibuprofen, 800 mg, Oral, Q8H JEFF  magnesium oxide, 400 mg, Oral, BID  melatonin, 3 mg, Oral, HS  methocarbamol, 750 mg, Oral, Q8H JEFF  metoprolol tartrate, 12 5 mg, Oral, Q12H [address]  mirtazapine, 30 mg, Oral, HS  OLANZapine, 5 mg, Oral, HS  pantoprazole, 40 mg, Oral, Daily Before Breakfast  saccharomyces boulardii, 250 mg, Oral, BID  senna, 8 6 mg, Oral, BID      Continuous IV Infusions:     PRN Meds:  acetaminophen, 650 mg, Oral, Q6H PRN  aluminum-magnesium hydroxide-simethicone, 30 mL, Oral, Q6H PRN  hydrOXYzine HCL, 25 mg, Oral, Q6H PRN  loperamide, 4 mg, Oral, 4x Daily PRN  LORazepam, 1 mg, Oral, Q6H PRN  ondansetron, 4 mg, Oral, Q6H PRN      Consult PT/OT  [facility]  IP CONSULT TO ACUTE PAIN SERVICE  IP CONSULT TO ACUTE PAIN SERVICE    Network Utilization Review Department  [email or web address]  ATTENTION: Please call with any questions or concerns to [phone] and carefully listen to the prompts so that you are directed to the right person  All voicemails are confidential   Basim Izaguirre all requests for admission clinical reviews, approved or denied determinations and any other requests to dedicated fax number below belonging to the campus where the patient is receiving treatment   List of dedicated fax numbers for the Facilities:  [address] [place] [address] DENIALS (Administrative/Medical Necessity) [phone]   [address] (Maternity/NICU/Pediatrics) [phone]   Clifford Frank [phone]   Major Rife [phone]   [facility] [phone]   Greg Allred [phone]   1205 [facility] 1525 [facility] [phone]   [facility]  [phone]   [address] [facility] [address] [facility]  [address] [facility] [address] [facility] [phone]

## 2020-10-23 NOTE — OCCUPATIONAL THERAPY NOTE
Occupational Therapy Cancellation Note  OT orders received, pt's chart reviewed  Attempted to see patient 2x this morning, however pt reporting she doesn't feel well and to return in an hour (second attempt was at agreed upon time)  OT to continue to follow and re-attempt as time permits and pt allows      Isabelle Jimenez, MOT, OTR/L

## 2020-10-23 NOTE — PHYSICAL THERAPY NOTE
Attempted x3 today  Patient refused all 3 times due to fatigue and pain  We will continue to follow as patient allows    Lauren Sales PT, DPT CSRS

## 2020-10-23 NOTE — NURSING NOTE
Patient continues to refuse medications stating "not right now"  Family medicine provider notified  Patient also continuing to refuse IV insertion and blood draws

## 2020-10-23 NOTE — ASSESSMENT & PLAN NOTE
· History of IV drug abuse  · Noted to have tricuspid valve vegetation present echo in July  TTE and MELISSA completed in August   · With septic emboli to lungs and posterior iliacus muscle  · Blood cultures showed Staph aureus bacteremia endocarditis in August  ? Recurrent MSSA bacteremia  ? Treated on IV cefazolin  · Underwent TVR 9/10/2020  ? Sent home with antibiotics, cefadroxil, which she did not complete  · STAT Blood culture x2, lactate, procal ordered 10/22/2020, pt is refusing IV draws  ?  Elevated CRP at 43 and Sed Rate at 34 at the ED

## 2020-10-23 NOTE — ASSESSMENT & PLAN NOTE
· Patient with history of IV drug abuse; reports most recently using IV heroin, cocaine, and benzodiazepines   · Admitted 10/22 reporting symptoms of withdrawal from the weekend: tremors, chest pain, fevers, chills, diaphoresis  · Status post Suboxone in the ED   · Acute Pain management following for management of withdrawals symptoms  and recommends          -Acetaminophen 975 mg p o  q 8 hours scheduled           -Clonidine 0 1 mg p o  q 8 hours scheduled           -Hydroxyzine 25 mg p o  q 6 hours p r n  itching, anxiety, agitation           -Lorazepam 1 mg IV q 6 hours p r n  anxiety, agitation           -Loperamide 4 mg p o  4 times daily p r n  diarrhea, maximum 8 capsules a day  -Zofran 4 mg IV q 6 hours p r n  nausea            -Melatonin 3 mg p o  HS scheduled           -Gabapentin 300 mg p o  HS scheduled           -Patient given Suboxone 8/2 mg sublingual 1 film daily             -Assess withdrawal symptoms and dose Suboxone accordingly           -Plan to discuss ongoing plan for abstinence once patient has less severe withdrawal symptoms  · CBC, CMP, urinalysis   · PT and OT eval

## 2020-10-23 NOTE — PLAN OF CARE
Problem: PAIN - ADULT  Goal: Verbalizes/displays adequate comfort level or baseline comfort level  Description: Interventions:  - Encourage patient to monitor pain and request assistance  - Assess pain using appropriate pain scale  - Administer analgesics based on type and severity of pain and evaluate response  - Implement non-pharmacological measures as appropriate and evaluate response  - Consider cultural and social influences on pain and pain management  - Notify physician/advanced practitioner if interventions unsuccessful or patient reports new pain  Outcome: Progressing     Problem: INFECTION - ADULT  Goal: Absence or prevention of progression during hospitalization  Description: INTERVENTIONS:  - Assess and monitor for signs and symptoms of infection  - Monitor lab/diagnostic results  - Monitor all insertion sites, i e  indwelling lines, tubes, and drains  - Monitor endotracheal if appropriate and nasal secretions for changes in amount and color  - Cooper appropriate cooling/warming therapies per order  - Administer medications as ordered  - Instruct and encourage patient and family to use good hand hygiene technique  - Identify and instruct in appropriate isolation precautions for identified infection/condition  Outcome: Progressing  Goal: Absence of fever/infection during neutropenic period  Description: INTERVENTIONS:  - Monitor WBC    Outcome: Progressing     Problem: METABOLIC, FLUID AND ELECTROLYTES - ADULT  Goal: Electrolytes maintained within normal limits  Description: INTERVENTIONS:  - Monitor labs and assess patient for signs and symptoms of electrolyte imbalances  - Administer electrolyte replacement as ordered  - Monitor response to electrolyte replacements, including repeat lab results as appropriate  - Instruct patient on fluid and nutrition as appropriate  Outcome: Progressing  Goal: Fluid balance maintained  Description: INTERVENTIONS:  - Monitor labs   - Monitor I/O and WT  - Instruct patient on fluid and nutrition as appropriate  - Assess for signs & symptoms of volume excess or deficit  Outcome: Progressing  Goal: Glucose maintained within target range  Description: INTERVENTIONS:  - Monitor Blood Glucose as ordered  - Assess for signs and symptoms of hyperglycemia and hypoglycemia  - Administer ordered medications to maintain glucose within target range  - Assess nutritional intake and initiate nutrition service referral as needed  Outcome: Progressing

## 2020-10-23 NOTE — PROGRESS NOTES
RN attempted to give pt evening medications, including Suboxone, and pt states "Not now, I am too tired "  RN educated patient on importance of taking these medications and patient is still persistent that she does not want them now

## 2020-10-23 NOTE — PROGRESS NOTES
Progress Note - Acute Pain Service    Sheryle Alter 34 y o  female MRN: [id number]  Unit/Bed#: -01 Encounter: [id number]      Assessment:   Principal Problem:    Heroin withdrawal ([facility] )  Active Problems:    Acute bacterial endocarditis    Bipolar 1 disorder ([facility] )    S/P TVR (tricuspid valve repair)    Chronic anticoagulation    GERD (gastroesophageal reflux disease)    Sheryle Alter is a 34 y o  female  with history of opioid use disorder (IV heroin), discharged on 9/27/20 following a greater than 6 week admission for IV antibiotics for bacteremia, tricuspid valve endocarditis and status post tricuspid valve replacement  Admitted for acute opioid withdrawal syndrome  Plan:    Continue acetaminophen 975 mg p o  q 8 hours scheduled   Continue ibuprofen 800 mg p o  q 8 hours scheduled   Suggest alternate acetaminophen and ibuprofen so that it doses given of 1 or the other every 4 hours   Will give Suboxone 8-2 mg sublingual film x1 today   Continue clonidine 0 1 mg p o  q 8 hours scheduled   Continue duloxetine 60 mg p o  daily scheduled   Continue hydroxyzine 25 mg p o  q 6 hours p r n  itching, anxiety, agitation   Continue lorazepam 1 mg p o  q 6 hours p r n  anxiety   Continue Remeron 30 mg p o  HS scheduled   Continue Zyprexa 5 mg p o  daily HS scheduled   Continue melatonin 3 mg p o  HS scheduled   Continue gabapentin 300 mg p o  HS scheduled   Continue methocarbamol 750 mg p o  q  8 hours scheduled   Continue bowel regimen to avoid opioid induced constipation   Once acute opioid withdrawal symptoms improved, will discuss with patient ongoing treatment for opioid use disorder  APS will continue to follow   Please call  / [phone] or [facility] Acute Pain Service - [facility] (/ between [phone] and on weekends) with questions or concerns    Pain History  Current pain location(s):  None  Pain Scale:   0  Quality:  Not applicable  24 hour history:  Patient admitted yesterday with acute opioid withdrawal syndrome and COWS score of 20  Patient states she feels only slightly better today and is willing to take Suboxone for symptoms suppression for now  Opioid requirement previous 24 hours:  Suboxone 8-2 mg x 1       Meds/Allergies   all current active meds have been reviewed, current meds:   Current Facility-Administered Medications   Medication Dose Route Frequency    acetaminophen (TYLENOL) tablet 650 mg  650 mg Oral Q6H PRN    acetaminophen (TYLENOL) tablet 975 mg  975 mg Oral Q8H [facility]    aluminum-magnesium hydroxide-simethicone (MYLANTA) oral suspension 30 mL  30 mL Oral Q6H PRN    apixaban (ELIQUIS) tablet 5 mg  5 mg Oral BID    ascorbic acid (VITAMIN C) tablet 500 mg  500 mg Oral Daily    aspirin chewable tablet 81 mg  81 mg Oral Daily    buprenorphine-naloxone (SUBOXONE) 8-2 mg per SL film 1 Film  1 Film Sublingual Once    buprenorphine-naloxone (SUBOXONE) 8-2 mg per SL film 1 Film  1 Film Sublingual Daily    calcium acetate (PHOSLO) capsule 667 mg  667 mg Oral TID With Meals    cholecalciferol (VITAMIN D3) tablet 1,000 Units  1,000 Units Oral Daily    cloNIDine (CATAPRES) tablet 0 1 mg  0 1 mg Oral Q8H [facility]    docusate sodium (COLACE) capsule 100 mg  100 mg Oral BID    DULoxetine (CYMBALTA) delayed release capsule 60 mg  60 mg Oral Daily    ferrous sulfate tablet 325 mg  325 mg Oral Daily With Breakfast    gabapentin (NEURONTIN) capsule 300 mg  300 mg Oral HS    hydrOXYzine HCL (ATARAX) tablet 25 mg  25 mg Oral Q6H PRN    ibuprofen (MOTRIN) tablet 800 mg  800 mg Oral Q8H [facility]    loperamide (IMODIUM) capsule 4 mg  4 mg Oral 4x Daily PRN    LORazepam (ATIVAN) tablet 1 mg  1 mg Oral Q6H PRN    magnesium oxide (MAG-OX) tablet 400 mg  400 mg Oral BID    melatonin tablet 3 mg  3 mg Oral HS    methocarbamol (ROBAXIN) tablet 750 mg  750 mg Oral Q8H [facility]    metoprolol tartrate (LOPRESSOR) partial tablet 12 5 mg  12 5 mg Oral Q12H [facility]    mirtazapine (REMERON) tablet 30 mg  30 mg Oral HS    OLANZapine (ZyPREXA) tablet 5 mg  5 mg Oral HS    ondansetron (ZOFRAN-ODT) dispersible tablet 4 mg  4 mg Oral Q6H PRN    pantoprazole (PROTONIX) EC tablet 40 mg  40 mg Oral Daily Before Breakfast    saccharomyces boulardii (FLORASTOR) capsule 250 mg  250 mg Oral BID    senna (SENOKOT) tablet 8 6 mg  8 6 mg Oral BID    and PTA meds:   Prior to Admission Medications   Prescriptions Last Dose Informant Patient Reported? Taking? DULoxetine (CYMBALTA) 60 mg delayed release capsule Past Month at Unknown time  No Yes   Sig: Take 1 capsule (60 mg total) by mouth daily   OLANZapine (ZyPREXA) 5 mg tablet Past Month at Unknown time  No Yes   Sig: Take 1 tablet (5 mg total) by mouth daily at bedtime   apixaban (ELIQUIS) 5 mg Past Month at Unknown time  No Yes   Sig: Take 2 tablets (10mg) BID through AM 10/4 then 1 tablet (5mg) BID for total of 3 months  ascorbic acid (VITAMIN C) 500 MG tablet Past Month at Unknown time  No Yes   Sig: Take 1 tablet (500 mg total) by mouth daily   aspirin 81 mg chewable tablet Past Month at Unknown time  No Yes   Sig: Chew 1 tablet (81 mg total) daily   calcium acetate (PHOSLO) 667 mg capsule Past Month at Unknown time  No Yes   Sig: Take 1 capsule (667 mg total) by mouth 3 (three) times a day with meals   cefadroxil (DURICEF) 500 mg capsule Past Month at Unknown time  No Yes   Sig: Take 1 capsule (500 mg total) by mouth every 12 (twelve) hours   cholecalciferol (VITAMIN D3) 1,000 units tablet Past Month at Unknown time  No Yes   Sig: Take 1 tablet (1,000 Units total) by mouth daily   docusate sodium (COLACE) 100 mg capsule Past Month at Unknown time  No Yes   Sig: Take 1 capsule (100 mg total) by mouth 2 (two) times a day Hold for soft stools     ferrous sulfate 325 (65 Fe) mg tablet Past Month at Unknown time  No Yes   Sig: Take 1 tablet (325 mg total) by mouth daily with breakfast   ibuprofen (MOTRIN) 800 mg tablet   No No   Sig: Take 1 tablet (800 mg total) by mouth every 8 (eight) hours for 14 days   lidocaine (LIDODERM) 5 %   No No   Sig: Apply 2 patches topically daily for 14 days Remove & Discard patch within 12 hours or as directed by MD   magnesium oxide (MAG-OX) 400 mg Past Month at Unknown time  No Yes   Sig: Take 1 tablet (400 mg total) by mouth 2 (two) times a day   methocarbamol (ROBAXIN) 750 mg tablet   No No   Sig: Take 1 tablet (750 mg total) by mouth every 8 (eight) hours for 14 days   metoprolol tartrate (LOPRESSOR) 25 mg tablet Past Month at Unknown time  No Yes   Sig: Take 0 5 tablets (12 5 mg total) by mouth every 12 (twelve) hours   mirtazapine (REMERON) 30 mg tablet Past Month at Unknown time  No Yes   Sig: Take 1 tablet (30 mg total) by mouth daily at bedtime   oxyCODONE (ROXICODONE) 5 mg immediate release tablet Past Month at Unknown time  No Yes   Sig: Take 1 tablet (5 mg total) by mouth every 4 (four) hours as needed for severe pain for up to 5 dosesMax Daily Amount: 30 mg   pantoprazole (PROTONIX) 40 mg tablet Past Month at Unknown time  No Yes   Sig: Take 1 tablet (40 mg total) by mouth daily   polyethylene glycol (MIRALAX) 17 g packet Past Month at Unknown time  No Yes   Sig: Take 17 g by mouth daily Hold for soft stools  saccharomyces boulardii (FLORASTOR) 250 mg capsule Past Month at Unknown time  No Yes   Sig: Take 1 capsule (250 mg total) by mouth 2 (two) times a day Hold for soft stools  senna (SENOKOT) 8 6 mg Past Month at Unknown time  No Yes   Sig: Take 1 tablet (8 6 mg total) by mouth 2 (two) times a day Hold for soft stools  Facility-Administered Medications: None       Allergies   Allergen Reactions    Cat Hair Extract     Dog Epithelium     Latex     Pollen Extract        Objective     Temp:  [98 1 °F (36 7 °C)] 98 1 °F (36 7 °C)  HR:  [63-69] 69  Resp:  [14-19] 19  BP: (117-120)/(63-75) 120/68    Physical Exam  Vitals signs and nursing note reviewed  Constitutional:       General: She is awake   She is not in acute distress  Comments: Appears uncomfortable  Eyes:      Pupils: Pupils are equal, round, and reactive to light  Cardiovascular:      Rate and Rhythm: Normal rate and regular rhythm  Pulmonary:      Effort: Pulmonary effort is normal       Breath sounds: Normal breath sounds  Skin:     General: Skin is warm and dry  Neurological:      General: No focal deficit present  Mental Status: She is alert  GCS: GCS eye subscore is 4  GCS verbal subscore is 5  GCS motor subscore is 6  Psychiatric:         Attention and Perception: Attention normal          Mood and Affect: Mood is depressed  Speech: Speech normal          Behavior: Behavior is withdrawn  Behavior is cooperative  Lab Results:   Results from last 7 days   Lab Units 10/22/20  0928   WBC Thousand/uL 5 64   HEMOGLOBIN g/dL 11 6   HEMATOCRIT % 37 0   PLATELETS Thousands/uL 410*      Results from last 7 days   Lab Units 10/22/20  0928   POTASSIUM mmol/L 4 0   CHLORIDE mmol/L 108   CO2 mmol/L 27   BUN mg/dL 6   CREATININE mg/dL 0 54*   CALCIUM mg/dL 9 8   ALK PHOS U/L 124*   ALT U/L 29   AST U/L 27       Imaging Studies: I have personally reviewed pertinent reports  EKG, Pathology, and Other Studies: I have personally reviewed pertinent reports  Counseling / Coordination of Care  Total floor / unit time spent today 30 minutes  Greater than 50% of total time was spent with the patient and / or family counseling and / or coordination of care  A description of the counseling / coordination of care:  Patient interview, physical examination, review of medical record, review of imaging and laboratory data, development of pain management plan, discussion of pain management plan with patient and primary service  Please note that the APS provides consultative services regarding pain management only    With the exception of ketamine and epidural infusions and except when indicated, final decisions regarding starting or changing doses of analgesic medications are at the discretion of the consulting service  Off hours consultation and/or medication management is generally not available      Garrett Rosado PA-C  Acute Pain Service

## 2020-10-23 NOTE — PROGRESS NOTES
On chart review, Pt's PCP is Bryce Alves, internist at [facility]  I spoke to pt and she confirms she is a patient at the [facility] in [facility] and not the [place] office  She does not remember the last time she was seen at that office  We do not cover for that practice  I contacted [facility] and pt has been transferred back to their service under Dr Trent Kim  Sign out to [facility] admitting provided      Discussed with Dr Lemuel Hernandez

## 2020-10-23 NOTE — ASSESSMENT & PLAN NOTE
· Pt has a history of prior DVT left axillary vein  · Continue Eliquis 5 mg bid  · Monitor INR, however refusing blood draws today

## 2020-10-23 NOTE — ASSESSMENT & PLAN NOTE
· TVR performed 9/10/20 for acute bacterial endocarditis (MSSA bacteremia, staph aureus)   ?  History of IVDA   · Sent home with cefadroxil antibiotics but did not complete course  · Started using IV drugs again; heroine, cocaine, benzodiazepines  · Continue 5 mg eliquis, 800 mg ibuprofen, 81 mg ASA, 25 mg metoprolol  · Continue 500 mg ascorbic acid, 667 mg calcium acetate, 1000 units vitamin 3,  325 mg ferrous sulfate  · ID consulted

## 2020-10-23 NOTE — PROGRESS NOTES
Lorin Begun with acute pain at pt bedside  Patient agreeable to morning meds, including Suboxone, at this time

## 2020-10-23 NOTE — CASE MANAGEMENT
CM attempted open  Pt sleeping and didn't wake to voice  LOS 1d  Not a Bundle  ReAdmit 8/12-9/27, Dx Endocarditis  ReAdmit Risk Score - Yellow    Per previous CM note 8/13/2020  Pt lives with her mother, father, half brother and her 2 yo daughter in a 2 story home in [place], [place]  [address]  2nd floor bedroom  Pt is independent, on furlough d/t pandemic and her parents drive for her  No DME  No hx of VNA or IP rehab  Hx Bipolar managed by PCP, Hx of IP D/A at [address] in [address] in 20/2018  Pt is active Heroin abuser  Pt uses [facility] in [address]  Pt has [employer] coverage  Last Admit CM contact PA CPS d/t drug use and primary caregiver of 2 yo Dtr  Main contact:    Mother Westly Moritz [phone]

## 2020-10-23 NOTE — ASSESSMENT & PLAN NOTE
· Continue cymbalta 60 mg, remeron 30 mg, zyprexa 5 mg   · Continue IV ativan 1 mg Q 6 PRN  · Patient has known history of signing out AMA and refusing care

## 2020-10-23 NOTE — QUICK NOTE
Per Care Everywhere record review patient outreach note from 9/28/2020 patient no longer sees Dr Cooper Joseph  Patient goes [address]  PDMP revealed patient had recent script from Dr Edis Mckeon  Discussed with Family Medicine who accepted patient under their service, Dr Anthony Krishna attending

## 2020-10-23 NOTE — PROGRESS NOTES
Progress Note - Christopher Grant 1990, 34 y o  female MRN: [id number]    Unit/Bed#: -01 Encounter: [id number]    Primary Care Provider: Pleas Favre, PA-C   Date and time admitted to hospital: 10/21/2020 11:26 PM     46-year-old female admitted with heroin withdrawal   Patient was initially admitted under the  [facility] service and transferred to [facility]  She reported to have used IV heroin( 2 days prior to presenting to the ED), cocaine and benzodiazepines the weekend prior to presentation  Patient has a history of IV drug use and underwent TAVR back in September for Staph aureus bacteremia endocarditis  Patient did not complete previous antibiotic treatment  Patient presented 2 days prior to admission to [facility] for concerns of endocarditis but signed out AMA  )  On chart review, blood cultures obtained at [facility] showed no growth at this time    * Heroin withdrawal [facility])  Assessment & Plan  · Patient with history of IV drug abuse; reports most recently using IV heroin, cocaine, and benzodiazepines   · Admitted 10/22 reporting symptoms of withdrawal from the weekend: tremors, chest pain, fevers, chills, diaphoresis  · Status post Suboxone in the ED   · Acute Pain management following for management of withdrawals symptoms  and recommends          -Acetaminophen 975 mg p o  q 8 hours scheduled           -Clonidine 0 1 mg p o  q 8 hours scheduled           -Hydroxyzine 25 mg p o  q 6 hours p r n  itching, anxiety, agitation           -Lorazepam 1 mg IV q 6 hours p r n  anxiety, agitation           -Loperamide 4 mg p o  4 times daily p r n  diarrhea, maximum 8 capsules a day  -Zofran 4 mg IV q 6 hours p r n  nausea            -Melatonin 3 mg p o  HS scheduled           -Gabapentin 300 mg p o  HS scheduled           -Patient given Suboxone 8/2 mg sublingual 1 film daily             -Assess withdrawal symptoms and dose Suboxone accordingly           -Plan to discuss ongoing plan for abstinence once patient has less severe withdrawal symptoms  · CBC, CMP, urinalysis   · PT and OT eval    Acute bacterial endocarditis  Assessment & Plan  · History of IV drug abuse  · Noted to have tricuspid valve vegetation present echo in July  TTE and MELISSA completed in August   · With septic emboli to lungs and posterior iliacus muscle  · Blood cultures showed Staph aureus bacteremia endocarditis in August  ? Recurrent MSSA bacteremia  ? Treated on IV cefazolin  · Underwent TVR 9/10/2020  ? Sent home with antibiotics, cefadroxil, which she did not complete  · STAT Blood culture x2, lactate, procal ordered 10/22/2020, pt is refusing IV draws  ? Elevated CRP at 43 and Sed Rate at 34 at the ED       S/P TVR (tricuspid valve repair)  Assessment & Plan  · TVR performed 9/10/20 for acute bacterial endocarditis (MSSA bacteremia, staph aureus)   ?  History of IVDA   · Sent home with cefadroxil antibiotics but did not complete course  · Started using IV drugs again; heroine, cocaine, benzodiazepines  · Continue 5 mg eliquis, 800 mg ibuprofen, 81 mg ASA, 25 mg metoprolol  · Continue 500 mg ascorbic acid, 667 mg calcium acetate, 1000 units vitamin 3,  325 mg ferrous sulfate  · ID consulted     GERD (gastroesophageal reflux disease)  Assessment & Plan  Continue pantoprazole 40 mg daily    Chronic anticoagulation  Assessment & Plan    · Pt has a history of prior DVT left axillary vein  · Continue Eliquis 5 mg bid  · Monitor INR, however refusing blood draws today       Bipolar 1 disorder (HCC)  Assessment & Plan  · Continue cymbalta 60 mg, remeron 30 mg, zyprexa 5 mg   · Continue IV ativan 1 mg Q 6 PRN  · Patient has known history of signing out AMA and refusing care             Diet:        Diet Orders   (From admission, onward)             Start     Ordered    10/22/20 1817  Diet Regular; Regular House  Diet effective now     Question Answer Comment   Diet Type Regular    Regular Regular House    RD to adjust diet per protocol? Yes        10/22/20 1816              DVT Ppx:  Eliquis 5 mg b i d  Code:  Full code  Disposition: This patient currently requires inpatient level care  Subjective:   Patient seen examined at bedside  Reports she wants to be left alone if she is not getting the PICC line placed  Discussed with overnight resident, nursing staff and [facility] service team      Objective:     Vitals: Blood pressure 109/68, pulse 69, temperature 98 1 °F (36 7 °C), resp  rate 16, height 5' 5" (1 651 m), weight 70 3 kg (154 lb 15 7 oz), SpO2 98 %, not currently breastfeeding  ,Body mass index is 25 79 kg/m²  No intake or output data in the 24 hours ending 10/23/20 1537    Physical Exam:   Pt refused exam and told me she want to be left alone    Physical Exam  Constitutional:       Comments: Laying in bed appears comfortable   HENT:      Head: Normocephalic and atraumatic  Nose: Nose normal    Cardiovascular:      Rate and Rhythm: Normal rate  Pulmonary:      Effort: No respiratory distress  Psychiatric:      Comments: Non cooperative         Invasive Devices     None                           Lab, Imaging and other studies: No new labs today patient refusing blood draw   Pertinent Lab Findings:   ·     ·   No results found for this or any previous visit (from the past 24 hour(s))       Imaging:  No new imaging today    VTE Pharmacologic Prophylaxis:  Eliquis      Current Facility-Administered Medications   Medication Dose Route Frequency    acetaminophen (TYLENOL) tablet 650 mg  650 mg Oral Q6H PRN    acetaminophen (TYLENOL) tablet 975 mg  975 mg Oral Q8H [facility]    aluminum-magnesium hydroxide-simethicone (MYLANTA) oral suspension 30 mL  30 mL Oral Q6H PRN    apixaban (ELIQUIS) tablet 5 mg  5 mg Oral BID    ascorbic acid (VITAMIN C) tablet 500 mg  500 mg Oral Daily    aspirin chewable tablet 81 mg  81 mg Oral Daily    buprenorphine-naloxone (SUBOXONE) 8-2 mg per SL film 1 Film  1 Film Sublingual Once    buprenorphine-naloxone (SUBOXONE) 8-2 mg per SL film 1 Film  1 Film Sublingual Daily    calcium acetate (PHOSLO) capsule 667 mg  667 mg Oral TID With Meals    cholecalciferol (VITAMIN D3) tablet 1,000 Units  1,000 Units Oral Daily    cloNIDine (CATAPRES) tablet 0 1 mg  0 1 mg Oral Q8H [facility]    docusate sodium (COLACE) capsule 100 mg  100 mg Oral BID    DULoxetine (CYMBALTA) delayed release capsule 60 mg  60 mg Oral Daily    ferrous sulfate tablet 325 mg  325 mg Oral Daily With Breakfast    gabapentin (NEURONTIN) capsule 300 mg  300 mg Oral HS    hydrOXYzine HCL (ATARAX) tablet 25 mg  25 mg Oral Q6H PRN    ibuprofen (MOTRIN) tablet 800 mg  800 mg Oral Q8H [facility]    loperamide (IMODIUM) capsule 4 mg  4 mg Oral 4x Daily PRN    LORazepam (ATIVAN) tablet 1 mg  1 mg Oral Q6H PRN    magnesium oxide (MAG-OX) tablet 400 mg  400 mg Oral BID    melatonin tablet 3 mg  3 mg Oral HS    methocarbamol (ROBAXIN) tablet 750 mg  750 mg Oral Q8H [facility]    metoprolol tartrate (LOPRESSOR) partial tablet 12 5 mg  12 5 mg Oral Q12H [facility]    mirtazapine (REMERON) tablet 30 mg  30 mg Oral HS    OLANZapine (ZyPREXA) tablet 5 mg  5 mg Oral HS    ondansetron (ZOFRAN-ODT) dispersible tablet 4 mg  4 mg Oral Q6H PRN    pantoprazole (PROTONIX) EC tablet 40 mg  40 mg Oral Daily Before Breakfast    saccharomyces boulardii (FLORASTOR) capsule 250 mg  250 mg Oral BID    senna (SENOKOT) tablet 8 6 mg  8 6 mg Oral BID       Pancho Saul MD  [facility] Medicine PGY-3  10/23/2020  3:37 PM    Please be aware that this note contains text that was dictated and there may be errors pertaining to "sound-alike" words during the dictation process

## 2020-10-23 NOTE — NURSING NOTE
RN attempted to give pt morning medications   Pt stated "Can I have a little bit, I'm still waking up "  RN asked if pt would at least take her Subaxone at this time and patient stated "no, not now '

## 2020-10-23 NOTE — OCCUPATIONAL THERAPY NOTE
Occupational Therapy Refusal  Attempted to see patient 3x for initial OT evaluation, however pt continuously refuses  OT to continue to follow and re-attempt as pt permits      Merlinda Parkin, SATYA, OTR/L

## 2020-10-24 PROBLEM — N39.0 UTI (URINARY TRACT INFECTION): Status: ACTIVE | Noted: 2020-10-24

## 2020-10-24 PROBLEM — M46.1 SACROILIITIS (HCC): Status: ACTIVE | Noted: 2020-10-24

## 2020-10-24 LAB
ALBUMIN SERPL BCP-MCNC: 3.8 G/DL (ref 3.5–5)
ALP SERPL-CCNC: 114 U/L (ref 46–116)
ALT SERPL W P-5'-P-CCNC: 31 U/L (ref 12–78)
ANION GAP SERPL CALCULATED.3IONS-SCNC: 5 MMOL/L (ref 4–13)
APTT PPP: 26 SECONDS (ref 23–37)
AST SERPL W P-5'-P-CCNC: 22 U/L (ref 5–45)
BACTERIA UR QL AUTO: ABNORMAL /HPF
BASOPHILS # BLD AUTO: 0.02 THOUSANDS/ΜL (ref 0–0.1)
BASOPHILS NFR BLD AUTO: 0 % (ref 0–1)
BILIRUB SERPL-MCNC: 0.27 MG/DL (ref 0.2–1)
BILIRUB UR QL STRIP: NEGATIVE
BUN SERPL-MCNC: 18 MG/DL (ref 5–25)
CALCIUM SERPL-MCNC: 9.8 MG/DL (ref 8.3–10.1)
CHLORIDE SERPL-SCNC: 111 MMOL/L (ref 100–108)
CLARITY UR: ABNORMAL
CO2 SERPL-SCNC: 26 MMOL/L (ref 21–32)
COLOR UR: ABNORMAL
CREAT SERPL-MCNC: 0.74 MG/DL (ref 0.6–1.3)
EOSINOPHIL # BLD AUTO: 0.31 THOUSAND/ΜL (ref 0–0.61)
EOSINOPHIL NFR BLD AUTO: 5 % (ref 0–6)
ERYTHROCYTE [DISTWIDTH] IN BLOOD BY AUTOMATED COUNT: 13 % (ref 11.6–15.1)
GFR SERPL CREATININE-BSD FRML MDRD: 111 ML/MIN/1.73SQ M
GLUCOSE SERPL-MCNC: 113 MG/DL (ref 65–140)
GLUCOSE UR STRIP-MCNC: NEGATIVE MG/DL
HCT VFR BLD AUTO: 35.6 % (ref 34.8–46.1)
HGB BLD-MCNC: 11.1 G/DL (ref 11.5–15.4)
HGB UR QL STRIP.AUTO: NEGATIVE
HYALINE CASTS #/AREA URNS LPF: ABNORMAL /LPF
IMM GRANULOCYTES # BLD AUTO: 0.02 THOUSAND/UL (ref 0–0.2)
IMM GRANULOCYTES NFR BLD AUTO: 0 % (ref 0–2)
INR PPP: 1.22 (ref 0.84–1.19)
KETONES UR STRIP-MCNC: NEGATIVE MG/DL
LEUKOCYTE ESTERASE UR QL STRIP: ABNORMAL
LYMPHOCYTES # BLD AUTO: 2.2 THOUSANDS/ΜL (ref 0.6–4.47)
LYMPHOCYTES NFR BLD AUTO: 38 % (ref 14–44)
MAGNESIUM SERPL-MCNC: 2.5 MG/DL (ref 1.6–2.6)
MCH RBC QN AUTO: 26.2 PG (ref 26.8–34.3)
MCHC RBC AUTO-ENTMCNC: 31.2 G/DL (ref 31.4–37.4)
MCV RBC AUTO: 84 FL (ref 82–98)
MONOCYTES # BLD AUTO: 0.35 THOUSAND/ΜL (ref 0.17–1.22)
MONOCYTES NFR BLD AUTO: 6 % (ref 4–12)
NEUTROPHILS # BLD AUTO: 2.96 THOUSANDS/ΜL (ref 1.85–7.62)
NEUTS SEG NFR BLD AUTO: 51 % (ref 43–75)
NITRITE UR QL STRIP: POSITIVE
NON-SQ EPI CELLS URNS QL MICRO: ABNORMAL /HPF
NRBC BLD AUTO-RTO: 0 /100 WBCS
PH UR STRIP.AUTO: 6.5 [PH]
PLATELET # BLD AUTO: 415 THOUSANDS/UL (ref 149–390)
PMV BLD AUTO: 8.1 FL (ref 8.9–12.7)
POTASSIUM SERPL-SCNC: 4.3 MMOL/L (ref 3.5–5.3)
PROT SERPL-MCNC: 8.1 G/DL (ref 6.4–8.2)
PROT UR STRIP-MCNC: ABNORMAL MG/DL
PROTHROMBIN TIME: 15.4 SECONDS (ref 11.6–14.5)
RBC # BLD AUTO: 4.24 MILLION/UL (ref 3.81–5.12)
RBC #/AREA URNS AUTO: ABNORMAL /HPF
SODIUM SERPL-SCNC: 142 MMOL/L (ref 136–145)
SP GR UR STRIP.AUTO: 1.02 (ref 1–1.03)
UROBILINOGEN UR QL STRIP.AUTO: 0.2 E.U./DL
WBC # BLD AUTO: 5.86 THOUSAND/UL (ref 4.31–10.16)
WBC #/AREA URNS AUTO: ABNORMAL /HPF

## 2020-10-24 PROCEDURE — 85610 PROTHROMBIN TIME: CPT | Performed by: PHYSICIAN ASSISTANT

## 2020-10-24 PROCEDURE — 80053 COMPREHEN METABOLIC PANEL: CPT | Performed by: PHYSICIAN ASSISTANT

## 2020-10-24 PROCEDURE — 99232 SBSQ HOSP IP/OBS MODERATE 35: CPT | Performed by: PHYSICIAN ASSISTANT

## 2020-10-24 PROCEDURE — 87186 SC STD MICRODIL/AGAR DIL: CPT | Performed by: PHYSICIAN ASSISTANT

## 2020-10-24 PROCEDURE — B518YZA FLUOROSCOPY OF SUPERIOR VENA CAVA USING OTHER CONTRAST, GUIDANCE: ICD-10-PCS | Performed by: INTERNAL MEDICINE

## 2020-10-24 PROCEDURE — 85025 COMPLETE CBC W/AUTO DIFF WBC: CPT | Performed by: PHYSICIAN ASSISTANT

## 2020-10-24 PROCEDURE — 36569 INSJ PICC 5 YR+ W/O IMAGING: CPT

## 2020-10-24 PROCEDURE — 85730 THROMBOPLASTIN TIME PARTIAL: CPT | Performed by: PHYSICIAN ASSISTANT

## 2020-10-24 PROCEDURE — 87086 URINE CULTURE/COLONY COUNT: CPT | Performed by: PHYSICIAN ASSISTANT

## 2020-10-24 PROCEDURE — 99255 IP/OBS CONSLTJ NEW/EST HI 80: CPT | Performed by: INTERNAL MEDICINE

## 2020-10-24 PROCEDURE — 83735 ASSAY OF MAGNESIUM: CPT | Performed by: PHYSICIAN ASSISTANT

## 2020-10-24 PROCEDURE — 87040 BLOOD CULTURE FOR BACTERIA: CPT | Performed by: PHYSICIAN ASSISTANT

## 2020-10-24 PROCEDURE — 81001 URINALYSIS AUTO W/SCOPE: CPT | Performed by: PHYSICIAN ASSISTANT

## 2020-10-24 PROCEDURE — 02HV33Z INSERTION OF INFUSION DEVICE INTO SUPERIOR VENA CAVA, PERCUTANEOUS APPROACH: ICD-10-PCS | Performed by: INTERNAL MEDICINE

## 2020-10-24 PROCEDURE — C1751 CATH, INF, PER/CENT/MIDLINE: HCPCS

## 2020-10-24 PROCEDURE — 87077 CULTURE AEROBIC IDENTIFY: CPT | Performed by: PHYSICIAN ASSISTANT

## 2020-10-24 RX ORDER — CEPHALEXIN 500 MG/1
500 CAPSULE ORAL EVERY 6 HOURS SCHEDULED
Status: DISCONTINUED | OUTPATIENT
Start: 2020-10-24 | End: 2020-10-26 | Stop reason: HOSPADM

## 2020-10-24 RX ORDER — PHENAZOPYRIDINE HYDROCHLORIDE 100 MG/1
100 TABLET, FILM COATED ORAL
Status: DISCONTINUED | OUTPATIENT
Start: 2020-10-24 | End: 2020-10-26 | Stop reason: HOSPADM

## 2020-10-24 RX ADMIN — CEPHALEXIN 500 MG: 500 CAPSULE ORAL at 20:33

## 2020-10-24 RX ADMIN — ACETAMINOPHEN 975 MG: 325 TABLET, FILM COATED ORAL at 22:22

## 2020-10-24 RX ADMIN — LORAZEPAM 1 MG: 1 TABLET ORAL at 20:33

## 2020-10-24 RX ADMIN — CALCIUM ACETATE 667 MG: 667 CAPSULE ORAL at 17:20

## 2020-10-24 RX ADMIN — GABAPENTIN 300 MG: 300 CAPSULE ORAL at 22:22

## 2020-10-24 RX ADMIN — DULOXETINE HYDROCHLORIDE 60 MG: 60 CAPSULE, DELAYED RELEASE ORAL at 08:30

## 2020-10-24 RX ADMIN — ASPIRIN 81 MG CHEWABLE TABLET 81 MG: 81 TABLET CHEWABLE at 08:29

## 2020-10-24 RX ADMIN — OLANZAPINE 5 MG: 5 TABLET, FILM COATED ORAL at 22:22

## 2020-10-24 RX ADMIN — CEPHALEXIN 500 MG: 500 CAPSULE ORAL at 14:30

## 2020-10-24 RX ADMIN — APIXABAN 5 MG: 5 TABLET, FILM COATED ORAL at 17:20

## 2020-10-24 RX ADMIN — APIXABAN 5 MG: 5 TABLET, FILM COATED ORAL at 08:29

## 2020-10-24 RX ADMIN — Medication 250 MG: at 17:20

## 2020-10-24 RX ADMIN — IBUPROFEN 800 MG: 400 TABLET ORAL at 22:22

## 2020-10-24 RX ADMIN — OXYCODONE HYDROCHLORIDE AND ACETAMINOPHEN 500 MG: 500 TABLET ORAL at 08:29

## 2020-10-24 RX ADMIN — MIRTAZAPINE 30 MG: 30 TABLET, FILM COATED ORAL at 22:22

## 2020-10-24 RX ADMIN — MELATONIN 3 MG: at 22:22

## 2020-10-24 RX ADMIN — MAGNESIUM OXIDE TAB 400 MG (241.3 MG ELEMENTAL MG) 400 MG: 400 (241.3 MG) TAB at 08:29

## 2020-10-24 RX ADMIN — Medication 12.5 MG: at 08:29

## 2020-10-24 RX ADMIN — PHENAZOPYRIDINE 100 MG: 100 TABLET ORAL at 14:30

## 2020-10-24 RX ADMIN — Medication 12.5 MG: at 22:21

## 2020-10-24 RX ADMIN — CALCIUM ACETATE 667 MG: 667 CAPSULE ORAL at 08:29

## 2020-10-24 RX ADMIN — MAGNESIUM OXIDE TAB 400 MG (241.3 MG ELEMENTAL MG) 400 MG: 400 (241.3 MG) TAB at 17:20

## 2020-10-24 RX ADMIN — LORAZEPAM 1 MG: 1 TABLET ORAL at 08:33

## 2020-10-24 RX ADMIN — Medication 1000 UNITS: at 08:29

## 2020-10-24 RX ADMIN — Medication 250 MG: at 08:30

## 2020-10-24 RX ADMIN — CLONIDINE HYDROCHLORIDE 0.1 MG: 0.1 TABLET ORAL at 22:21

## 2020-10-24 RX ADMIN — FERROUS SULFATE TAB 325 MG (65 MG ELEMENTAL FE) 325 MG: 325 (65 FE) TAB at 08:29

## 2020-10-24 RX ADMIN — METHOCARBAMOL TABLETS 750 MG: 750 TABLET, COATED ORAL at 22:22

## 2020-10-24 NOTE — ASSESSMENT & PLAN NOTE
· During last admission was tested for HIV and was negative  · Recommended to have outpatient follow-up with GI

## 2020-10-24 NOTE — ASSESSMENT & PLAN NOTE
· CT scan performed of the right lower extremity on 8/21/2020 showed a 9 mm collection raising concern for right ilacus muscle abscess sacroiliitis  · MRI performed on 8/31/2020 was noted to show sacroiliitis that was felt to be septic in nature  · MRI done on 9/24/2020 shows no intramuscular abscess  · CRP is elevated on admission  · Patient was discharged with cefadroxil 500 mg p o  Q 12 hours until CRP normalizes and was recommended follow-up with Infectious Disease 2-4 weeks after discharge  · The patient never followed up nor did she take these antibiotics  · Infectious Disease has been consulted for assistance in management regarding this

## 2020-10-24 NOTE — ASSESSMENT & PLAN NOTE
· Pt has a history of 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  · Continue Eliquis 5 mg for 3 months total

## 2020-10-24 NOTE — PLAN OF CARE
Problem: PAIN - ADULT  Goal: Verbalizes/displays adequate comfort level or baseline comfort level  Description: Interventions:  - Encourage patient to monitor pain and request assistance  - Assess pain using appropriate pain scale  - Administer analgesics based on type and severity of pain and evaluate response  - Implement non-pharmacological measures as appropriate and evaluate response  - Consider cultural and social influences on pain and pain management  - Notify physician/advanced practitioner if interventions unsuccessful or patient reports new pain  Outcome: Progressing     Problem: INFECTION - ADULT  Goal: Absence or prevention of progression during hospitalization  Description: INTERVENTIONS:  - Assess and monitor for signs and symptoms of infection  - Monitor lab/diagnostic results  - Monitor all insertion sites, i e  indwelling lines, tubes, and drains  - Monitor endotracheal if appropriate and nasal secretions for changes in amount and color  - [place] appropriate cooling/warming therapies per order  - Administer medications as ordered  - Instruct and encourage patient and family to use good hand hygiene technique  - Identify and instruct in appropriate isolation precautions for identified infection/condition  Outcome: Progressing  Goal: Absence of fever/infection during neutropenic period  Description: INTERVENTIONS:  - Monitor WBC    Outcome: Progressing     Problem: METABOLIC, FLUID AND ELECTROLYTES - ADULT  Goal: Electrolytes maintained within normal limits  Description: INTERVENTIONS:  - Monitor labs and assess patient for signs and symptoms of electrolyte imbalances  - Administer electrolyte replacement as ordered  - Monitor response to electrolyte replacements, including repeat lab results as appropriate  - Instruct patient on fluid and nutrition as appropriate  Outcome: Progressing  Goal: Fluid balance maintained  Description: INTERVENTIONS:  - Monitor labs   - Monitor I/O and WT  - Instruct patient on fluid and nutrition as appropriate  - Assess for signs & symptoms of volume excess or deficit  Outcome: Progressing  Goal: Glucose maintained within target range  Description: INTERVENTIONS:  - Monitor Blood Glucose as ordered  - Assess for signs and symptoms of hyperglycemia and hypoglycemia  - Administer ordered medications to maintain glucose within target range  - Assess nutritional intake and initiate nutrition service referral as needed  Outcome: Progressing

## 2020-10-24 NOTE — ASSESSMENT & PLAN NOTE
· History of IV drug abuse  · Underwent tricuspid valve repair 9/10/2020  · Infectious disease consulted  · Discussed with CT surgery  Incision appears clean and dry and intact    · From their standpoint, continue AC for 3 months total  No need for ASA, statin

## 2020-10-24 NOTE — PHYSICAL THERAPY NOTE
Physical Therapy Cancellation Note      PT orders received, chart reviewed  RN cleared pt to be seen by PT  PT attempted to see pt, however, pt refusing at this time reporting she is "trying to rest"  Pt educated on importance of mobility while in hospital, pt continues to refuse  PT to continue to follow and re-attempt to see pt for initial evaluation as time permits      Elen Salazar, PT, DPT

## 2020-10-24 NOTE — CONSULTS
Consultation - Infectious Disease   Ross Garner 34 y o  female MRN: [id number]  Unit/Bed#: -01 Encounter: [id number]      IMPRESSION & RECOMMENDATIONS:   Impression/Recommendations: This is a 34 y o  female, active IVDU with noncompliance of care, had a recent prolonged hospitalization here for MSSA bacteremia with TV endocarditis, status post TV repair and 6 weeks IV antibiotic  Patient also has possible septic right sacroiliitis, on p o  Antibiotic after discharge but patient was not compliant with taking  She is now admitted for heroin withdrawal     1  Probable right septic sacroiliitis  This is most likely secondary to MSSA bacteremia from IVDU  Patient completed 6 week course of IV antibiotic  However, ESR remains elevated  Patient was supposed to be on p o  Cefadroxil until ESR normalizes  However, she has been noncompliant and only took a few days of antibiotic after discharge on 09/27  Patient wants to go back on antibiotic  Will restart p o  Keflex  Patient should stay on it until ESR normalizes  Restart p o  Keflex  Monitor ESR monthly  Patient should stay on p o  Keflex until ESR normalizes  2  MSSA bacteremia, secondary to IVDU  Patient completed 6 week course of IV antibiotic  No evidence of recurrence  No further antibiotic needed for this  3  TV endocarditis, secondary to IVDU  Patient is status post TV repair with annuloplasty  She complete 6 week course of IV antibiotic  No evidence of recurrence  No further antibiotic needed for this  4  Active IVDU  Patient continues to use IV drugs even after recent admission for bacteremia and endocarditis  She is at very high risk for recurrent bacteremia and endocarditis  I discussed with her in great detail regarding the need to stop IV drugs  Patient is not receptive to discussion  Previous hospitalization records reviewed in detail  Discussed with patient in detail regarding the above plan    Discussed with slim service  Thank you for this consultation  We will follow along with you  HISTORY OF PRESENT ILLNESS:  Reason for Consult:  Sacroiliitis  HPI: Giovany Lara is a 34 y o  female, active IDU with history of noncompliance and leaving AMA, had a prolonged hospitalization here from 08/12 until 9/27 for MSSA bacteremia and TV endocarditis  Patient underwent TV repair with annuloplasty  She completed 6 weeks of IV cefazolin on 09/27  Patient also has right iliacus muscle abscess and sacroiliitis  Repeat imaging showed resolution of abscess but with changes concerning for active sacroiliitis  Patient's ESR remained mildly elevated  Therefore, at discharge, patient was placed on p o  Cefadroxil  Unfortunately, after discharge, patient took antibiotic for only a few days then discontinued not just antibiotic but all her medications  She went back to IV drug injection  Patient has had multiple ER visits at different places since discharge above  Once again, she left the ER AMA  Patient finally came back to ER on 10/22 with heroin withdrawal   She was admitted  Due to persistently elevated ESR, we are asked to evaluate the patient  At present, patient is very poorly cooperative  She states she feels better  She states that her withdrawal symptoms are improved  She denies any hip pain  Patient states she is interested to take p o  Antibiotic again  REVIEW OF SYSTEMS:  A complete system-based review was done  Except for what is noted in HPI above, ROS of systems is otherwise negative      PAST MEDICAL HISTORY:  Past Medical History:   Diagnosis Date    Abnormal Pap smear of cervix     Anxiety     Depression     Endocarditis     2018    Hepatitis C     HPV (human papilloma virus) anogenital infection      Past Surgical History:   Procedure Laterality Date    IR PICC LINE PLACEMENT DOUBLE LUMEN  8/26/2020    KNEE SURGERY Left     MOUTH SURGERY      SC REPLACE TRICUSPID W CP BYPASS N/A 9/10/2020    Procedure: REPAIR VALVE TRICUSPID with Medtronic 30mm 3D Contour  Annuloplasty Ring;  Surgeon: Alina Padron MD;  [facility];  Service: Cardiac Surgery    TOOTH EXTRACTION N/A 2020    Procedure: EXTRACTION TOOTH 32;  Surgeon: Alphonso Palmer DMD;  [facility];  Service: Maxillofacial     Problem list reviewed  FAMILY HISTORY:  Non-contributory    SOCIAL HISTORY:  Social History     Substance and Sexual Activity   Alcohol Use Not Currently    Alcohol/week: 0 0 standard drinks    Frequency: Never    Binge frequency: Never     Social History     Substance and Sexual Activity   Drug Use Yes    Types: Heroin, Marijuana, Benzodiazepines, Cocaine    Comment: last Heroin use in September; used "medical marijuana"      Social History     Tobacco Use   Smoking Status Former Smoker    Packs/day: 0 50    Types: Cigarettes    Last attempt to quit: 2016    Years since quitting: 3 9   Smokeless Tobacco Never Used       ALLERGIES:  Allergies   Allergen Reactions    Cat Hair Extract     Dog Epithelium     Latex     Pollen Extract        MEDICATIONS:  All current active medications have been reviewed  Patient is currently not on any antibiotic  PHYSICAL EXAM:  Vitals:  Temp:  [98 1 °F (36 7 °C)] 98 1 °F (36 7 °C)  Resp:  [16-18] 18  BP: (101-119)/(55-69) 115/69  Temp (24hrs), Av 1 °F (36 7 °C), Min:98 1 °F (36 7 °C), Max:98 1 °F (36 7 °C)  Current: Temperature: 98 1 °F (36 7 °C)     Physical Exam:  General:  Well-nourished, well-developed, chronically ill-appearing, in no acute distress  Awake, alert and oriented x 3    Eyes:  Conjunctive clear with no hemorrhages or effusions  Oropharynx:  No ulcers, no lesions, pharynx benign, no tonsillitis  Neck:  Supple, no lymphadenopathy, no mass, nontender  Lungs:  Expansion symmetric, no rales, no wheezing, no accessory muscle use  Cardiac:  Regular rate and rhythm, normal S1, normal S2, no murmurs  Abdomen: Soft, nondistended, non-tender, no HSM  Extremities:  No edema, no erythema, nontender  No ulcers  Skin:  No rashes, no ulcers  Neurological:  Moves all four extremities spontaneously, sensation grossly intact    LABS, IMAGING, & OTHER STUDIES:  Lab Results:  I have personally reviewed pertinent labs  Results from last 7 days   Lab Units 10/24/20  1157 10/22/20  0928   POTASSIUM mmol/L 4 3 4 0   CHLORIDE mmol/L 111* 108   CO2 mmol/L 26 27   BUN mg/dL 18 6   CREATININE mg/dL 0 74 0 54*   EGFR ml/min/1 73sq m 111 130   CALCIUM mg/dL 9 8 9 8   AST U/L 22 27   ALT U/L 31 29   ALK PHOS U/L 114 124*     Results from last 7 days   Lab Units 10/24/20  1157 10/22/20  0928   WBC Thousand/uL 5 86 5 64   HEMOGLOBIN g/dL 11 1* 11 6   PLATELETS Thousands/uL 415* 410*           Imaging Studies:   I have personally reviewed pertinent imaging study reports and images in PACS  EKG, Pathology, and Other Studies:   I have personally reviewed pertinent reports

## 2020-10-24 NOTE — PROGRESS NOTES
[facility]  Progress Note - Dennise Travis 1990, 34 y o  female MRN: [id number]  Unit/Bed#: -[facility] Encounter: [id number]  Primary Care Provider: Kiet Cisneros PA-C   Date and time admitted to hospital: 10/21/2020 11:26 PM    * Heroin withdrawal [facility])  Assessment & Plan  · Patient has history of IV drug abuse; reports most recently using IV heroin, cocaine, and benzodiazepines from 10/2-10/21  · Patient reports withdrawal symptoms are improving since admission  · Started on Suboxone by acute pain management - discussed with on call provider and pharmacy regarding numerous contradicting orders written - awaiting response  Per notes, should be on 1 film daily  ·  Acute Pain management consulted for management of withdrawal symptoms   · Continue clonidine 0 1 Q8H, Tylenol 975 mg Q8H, motrin 800 mg q8hrs, gabapentin 300 HS, melatonin 3 mg HS, robaxin 750 mg q8hr  · Continue zofran prn, loperamide prn, HydroxyzIne 50 mg Q6H prn agitation and anxiety   · Continue lorazepam 1 mg PO q6hrs PRN anxiety  · Continue cymbalta, remeron, zyprexa    Suspected UTI (urinary tract infection)  Assessment & Plan  · Obtain urinalysis with reflex to culture  · Pending results of this will determine if will require antibiotics  · Initiate Pyridium for pain    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  Assessment & Plan  · History of IV drug abuse  · Underwent tricuspid valve repair 9/10/2020  · Infectious disease consulted  · Discussed with CT surgery  Incision appears clean and dry and intact  · From their standpoint, continue AC for 3 months total  No need for ASA, statin    Bacteremia due to Staphylococcus aureus  Assessment & Plan  · Patient left AMA on multiple occasion  · Completed antibiotics on 9/27/2020 which was 6 weeks from 1st negative blood culture  · 1 set of blood cultures were obtained at [facility] in [facility] 5 days ago    I confirmed with them today that this were negative  · Repeat blood cultures ordered - obtaining PICC Line for access and then will obtain    Sacroiliitis [facility])  Assessment & Plan  · CT scan performed of the right lower extremity on 8/21/2020 showed a 9 mm collection raising concern for right ilacus muscle abscess sacroiliitis  · MRI performed on 8/31/2020 was noted to show sacroiliitis that was felt to be septic in nature  · MRI done on 9/24/2020 shows no intramuscular abscess  · CRP is elevated on admission  · Patient was discharged with cefadroxil 500 mg p o  Q 12 hours until CRP normalizes and was recommended follow-up with Infectious Disease 2-4 weeks after discharge  · The patient never followed up nor did she take these antibiotics  · Infectious Disease has been consulted for assistance in management regarding this  Chronic anticoagulation  Assessment & Plan  · Pt has a history of 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  · Continue Eliquis 5 mg for 3 months total      Bipolar 1 disorder (HCC)  Assessment & Plan  · Continue cymbalta 60 mg, remeron 30 mg, zyprexa 5 mg     Chronic hepatitis C virus infection ([facility] [address] )  Assessment & Plan  · During last admission was tested for HIV and was negative  · Recommended to have outpatient follow-up with GI      VTE Pharmacologic Prophylaxis: VTE Score: 8 High Risk - Pharmacological DVT Prophylaxis Ordered: Apixaban (Eliquis)  Sequential Compression Devices Ordered  Patient Centered Rounds: I have performed bedside rounds with nursing staff today  Discussions with Specialists or Other Care Team Provider: CT surgery, ID    Education and Discussions with Family / Patient: Patient declined call to   Time Spent for Care: 45 minutes  More than 50% of total time spent on counseling and coordination of care as described above      Current Length of Stay: 2 day(s)  Current Patient Status: Inpatient   Certification Statement: The patient will continue to require additional inpatient hospital stay due to heroin withdrawal  Discharge Plan: Anticipate discharge in 48-72 hrs to rehab facility  for drugs    Code Status: Level 1 - Full Code    Subjective:   Patient was admitted in July secondary to endocarditis, bacteremia from IV drug abuse  She was noted to leave AMA  She was noted to have significant septic emboli  She then went to [facility] several days later and was admitted with endocarditis, sepsis and once again subsequently left AMA  She then re-presented [facility] a week later and was transferred to [facility] secondary to severe tricuspid valve disease with concern for abscess  She was noted to be admitted at [facility] for 46 days and was subsequently discharged on   She was noted have Staph aureus bacteremia, acute tricuspid valve endocarditis, septic pulmonary emboli, chronic hepatitis-C, bipolar disorder and underwent trich cuspid valve repair on   She also underwent dental extractions  The patient was completed antibiotics while inpatient and discharged home with additional oral antibiotics  She was then seen in the emergency department on 10/2/2020 however eloped from the ER waiting room  At that time she thought she was withdrawing from heroin  Patient return to the emergency department on 10/21/2020 with heroin withdrawal   She reported that she was using IV heroin, cocaine and benzodiazepines over the weekend and felt like she was then going into withdrawal   She reported fever, chills, night sweats, tremors, diarrhea and chest pain  She was given Suboxone in the emergency department  Patient was seen in consultation by pain management secondary to heroin withdrawal was started on clonidine, Tylenol, gabapentin, melatonin, Suboxone and continued on chronic medications as well      Objective:     Vitals:   Temp (24hrs), Av 1 °F (36 7 °C), Min:98 1 °F (36 7 °C), Max:98 1 °F (36 7 °C)    Temp:  [98 1 °F (36 7 °C)] 98 1 °F (36 7 °C)  Resp:  [16-18] 18  BP: (101-119)/(55-69) 115/69  Body mass index is 25 79 kg/m²  Input and Output Summary (last 24 hours): Intake/Output Summary (Last 24 hours) at 10/24/2020 0935  Last data filed at 10/24/2020 0900  Gross per 24 hour   Intake 120 ml   Output    Net 120 ml       Physical Exam:   Physical Exam  Vitals signs and nursing note reviewed  Constitutional:       General: She is not in acute distress  Appearance: Normal appearance  She is ill-appearing (Chronically)  She is not diaphoretic  HENT:      Head: Normocephalic and atraumatic  Mouth/Throat:      Mouth: Mucous membranes are moist    Cardiovascular:      Rate and Rhythm: Regular rhythm  Tachycardia present  Heart sounds: No murmur  Pulmonary:      Effort: Pulmonary effort is normal       Breath sounds: Normal breath sounds  No stridor  No wheezing, rhonchi or rales  Comments: Incision midline sternum clean dry and intact with no surrounding erythema  Abdominal:      General: Bowel sounds are normal       Palpations: Abdomen is soft  There is no mass  Tenderness: There is no abdominal tenderness  There is no guarding  Musculoskeletal:      Right lower leg: No edema  Left lower leg: No edema  Skin:     General: Skin is warm and dry  Neurological:      Mental Status: She is alert  Comments: Pupils round and reactive  Approximately 3 mm in size  Follows commands  Speech clear   Psychiatric:      Comments: Slightly restless         Additional Data:     Labs:  Results from last 7 days   Lab Units 10/22/20  0928   WBC Thousand/uL 5 64   HEMOGLOBIN g/dL 11 6   HEMATOCRIT % 37 0   PLATELETS Thousands/uL 410*   NEUTROS PCT % 50   LYMPHS PCT % 35   MONOS PCT % 7   EOS PCT % 7*     Results from last 7 days   Lab Units 10/22/20  0928   SODIUM mmol/L 140   POTASSIUM mmol/L 4 0   CHLORIDE mmol/L 108   CO2 mmol/L 27   BUN mg/dL 6   CREATININE mg/dL 0 54* ANION GAP mmol/L 5   CALCIUM mg/dL 9 8   ALBUMIN g/dL 3 7   TOTAL BILIRUBIN mg/dL 0 32   ALK PHOS U/L 124*   ALT U/L 29   AST U/L 27   GLUCOSE RANDOM mg/dL 97                 Results from last 7 days   Lab Units 10/22/20  0929   PROCALCITONIN ng/ml <0 05       Lines/Drains:  Invasive Devices     None                 Telemetry:        Imaging: Reviewed radiology reports from this admission including: chest xray    Recent Cultures (last 7 days):         Last 24 Hours Medication List:   Current Facility-Administered Medications   Medication Dose Route Frequency Provider Last Rate    acetaminophen  650 mg Oral Q6H PRN Ismael Auguste MD      acetaminophen  975 mg Oral Q8H [facility] Ismael Auguste MD      aluminum-magnesium hydroxide-simethicone  30 mL Oral Q6H PRN Ismael Auguste MD      apixaban  5 mg Oral BID Ismael Auguste MD      ascorbic acid  500 mg Oral Daily Ismael Auguste MD      aspirin  81 mg Oral Daily Ismael Auguste MD      buprenorphine-naloxone  1 Film Sublingual Daily Miranda Corey PA-C      buprenorphine-naloxone  2 Film Sublingual Daily Miranda Corey PA-C      calcium acetate  667 mg Oral TID With Meals Ismael Auguste MD      cholecalciferol  1,000 Units Oral Daily Ismael Auguste MD      cloNIDine  0 1 mg Oral [facility] Ismael Auguste MD      docusate sodium  100 mg Oral BID Ismael Auguste MD      DULoxetine  60 mg Oral Daily Ismael Auguste MD      ferrous sulfate  325 mg Oral Daily With Breakfast Ismael Auguste MD      gabapentin  300 mg Oral HS Ismael Auguste MD      hydrOXYzine HCL  25 mg Oral Q6H PRN Ismael Auguste MD      ibuprofen  800 mg Oral Q8H [facility] Ismael Auguste MD      loperamide  4 mg Oral 4x Daily PRN Ismael Auguste MD      LORazepam  1 mg Oral Q6H PRN Ismael Auguste MD      magnesium oxide  400 mg Oral BID Ismael Auguste MD      melatonin  3 mg Oral HS Ismael Auguste MD      methocarbamol  750 mg Oral [facility] Roger Hannon MD      metoprolol tartrate  12 5 mg Oral Q12H [address] Roger Hannon MD      mirtazapine  30 mg Oral HS Roger Hannon MD      OLANZapine  5 mg Oral HS Roger Hannon MD      ondansetron  4 mg Oral Q6H PRN Roger Hannon MD      pantoprazole  40 mg Oral Daily Before Breakfast Roger Hannon MD      phenazopyridine  100 mg Oral TID With Meals [facility] MILLIE Chen      saccharomyces boulardii  250 mg Oral BID Roger Hannon MD      senna  8 6 mg Oral BID Rogre Hannon MD          Today, Patient Was Seen By: Meera Mack PA-C    ** Please Note: Dictation voice to text software may have been used in the creation of this document   **

## 2020-10-24 NOTE — PROGRESS NOTES
Patient requesting IV ativan  I informed her she has PO ativan ordered and there is no indication to change to IV  I also discussed how she is refusing multiple medications including Suboxone, Tylenol, Motrin, Robaxin, clonidine and the pyridium she requested earlier      She reports "I know how this works britt went through withdrawal before "

## 2020-10-24 NOTE — PROCEDURES
Insert PICC line    Date/Time: 10/24/2020 11:35 AM  Performed by: Renzo Danielle RN  Authorized by: Anyi Estrella PA-C     Patient location:  Bedside  Other Assisting Provider: Yes (comment) (Sangeeta ALBARRAN )    Consent:     Consent obtained:  Written (chandni obtained consent )    Consent given by:  Patient    Procedural risks discussed: by MD Goldman protocol:     Procedure explained and questions answered to patient or proxy's satisfaction: yes      Relevant documents present and verified: yes      Test results available and properly labeled: yes      Radiology Images displayed and confirmed  If images not available, report reviewed: yes      Required blood products, implants, devices, and special equipment available: yes      Site/side marked: yes      Immediately prior to procedure, a time out was called: yes      Patient identity confirmed:  Verbally with patient and arm band  Pre-procedure details:     Hand hygiene: Hand hygiene performed prior to insertion      Sterile barrier technique: All elements of maximal sterile technique followed      Skin preparation:  ChloraPrep    Skin preparation agent: Skin preparation agent completely dried prior to procedure    Indications:     PICC line indications: no peripheral vascular access    Anesthesia (see MAR for exact dosages):      Anesthesia method:  Local infiltration    Local anesthetic:  Lidocaine 1% w/o epi (2ml)  Procedure details:     Location:  Brachial    Vessel type: vein      Laterality:  Left    Site selection rationale:  No Left basilic visualized, unable to advance PICC in the Right arm in the past     Approach: percutaneous technique used      Patient position:  Flat    Procedural supplies:  Double lumen    Catheter size:  5 Fr    Landmarks identified: yes      Ultrasound guidance: yes      Sterile ultrasound techniques: Sterile gel and sterile probe covers were used      Number of attempts:  2    Successful placement: yes      Vessel of catheter tip end:  Sherlock 3CG confirmed    Total catheter length (cm):  47    Catheter out on skin (cm):  1    Max flow rate:  999ml/hr     Arm circumference:  27  Post-procedure details:     Post-procedure:  Securement device placed and dressing applied    Assessment:  Blood return through all ports    Post-procedure complications: none      Patient tolerance of procedure:   Tolerated well, no immediate complications

## 2020-10-24 NOTE — ASSESSMENT & PLAN NOTE
· Patient left AMA on multiple occasion  · Completed antibiotics on 9/27/2020 which was 6 weeks from 1st negative blood culture  · 1 set of blood cultures were obtained at [facility] in [facility] 5 days ago    I confirmed with them today that this were negative  · Repeat blood cultures ordered - obtaining PICC Line for access and then will obtain

## 2020-10-24 NOTE — ASSESSMENT & PLAN NOTE
· Patient has history of IV drug abuse; reports most recently using IV heroin, cocaine, and benzodiazepines from 10/2-10/21  · Patient reports withdrawal symptoms are improving since admission  · Started on Suboxone by acute pain management - discussed with on call provider and pharmacy regarding numerous contradicting orders written - awaiting response   Per notes, should be on 1 film daily  ·  Acute Pain management consulted for management of withdrawal symptoms   · Continue clonidine 0 1 Q8H, Tylenol 975 mg Q8H, motrin 800 mg q8hrs, gabapentin 300 HS, melatonin 3 mg HS, robaxin 750 mg q8hr  · Continue zofran prn, loperamide prn, HydroxyzIne 50 mg Q6H prn agitation and anxiety   · Continue lorazepam 1 mg PO q6hrs PRN anxiety  · Continue cymbalta, remeron, zyprexa

## 2020-10-24 NOTE — ASSESSMENT & PLAN NOTE
· Obtain urinalysis with reflex to culture  · Pending results of this will determine if will require antibiotics  · Initiate Pyridium for pain

## 2020-10-25 PROCEDURE — 99233 SBSQ HOSP IP/OBS HIGH 50: CPT | Performed by: INTERNAL MEDICINE

## 2020-10-25 PROCEDURE — 99232 SBSQ HOSP IP/OBS MODERATE 35: CPT | Performed by: PHYSICIAN ASSISTANT

## 2020-10-25 RX ADMIN — CEPHALEXIN 500 MG: 500 CAPSULE ORAL at 03:38

## 2020-10-25 RX ADMIN — MIRTAZAPINE 30 MG: 30 TABLET, FILM COATED ORAL at 21:13

## 2020-10-25 RX ADMIN — BUPRENORPHINE AND NALOXONE 1 FILM: 8; 2 FILM BUCCAL; SUBLINGUAL at 17:19

## 2020-10-25 RX ADMIN — CLONIDINE HYDROCHLORIDE 0.1 MG: 0.1 TABLET ORAL at 06:31

## 2020-10-25 RX ADMIN — CALCIUM ACETATE 667 MG: 667 CAPSULE ORAL at 17:16

## 2020-10-25 RX ADMIN — METHOCARBAMOL TABLETS 750 MG: 750 TABLET, COATED ORAL at 06:29

## 2020-10-25 RX ADMIN — GABAPENTIN 300 MG: 300 CAPSULE ORAL at 21:12

## 2020-10-25 RX ADMIN — METHOCARBAMOL TABLETS 750 MG: 750 TABLET, COATED ORAL at 21:13

## 2020-10-25 RX ADMIN — APIXABAN 5 MG: 5 TABLET, FILM COATED ORAL at 17:16

## 2020-10-25 RX ADMIN — LORAZEPAM 1 MG: 1 TABLET ORAL at 17:18

## 2020-10-25 RX ADMIN — CLONIDINE HYDROCHLORIDE 0.1 MG: 0.1 TABLET ORAL at 21:14

## 2020-10-25 RX ADMIN — CEPHALEXIN 500 MG: 500 CAPSULE ORAL at 21:13

## 2020-10-25 RX ADMIN — PHENAZOPYRIDINE 100 MG: 100 TABLET ORAL at 17:17

## 2020-10-25 RX ADMIN — ACETAMINOPHEN 975 MG: 325 TABLET, FILM COATED ORAL at 06:30

## 2020-10-25 RX ADMIN — Medication 250 MG: at 17:17

## 2020-10-25 RX ADMIN — IBUPROFEN 800 MG: 400 TABLET ORAL at 06:29

## 2020-10-25 RX ADMIN — PANTOPRAZOLE SODIUM 40 MG: 40 TABLET, DELAYED RELEASE ORAL at 06:30

## 2020-10-25 RX ADMIN — OLANZAPINE 5 MG: 5 TABLET, FILM COATED ORAL at 21:13

## 2020-10-25 RX ADMIN — ACETAMINOPHEN 975 MG: 325 TABLET, FILM COATED ORAL at 21:13

## 2020-10-25 RX ADMIN — MELATONIN 3 MG: at 21:12

## 2020-10-25 RX ADMIN — Medication 12.5 MG: at 21:13

## 2020-10-25 RX ADMIN — MAGNESIUM OXIDE TAB 400 MG (241.3 MG ELEMENTAL MG) 400 MG: 400 (241.3 MG) TAB at 17:16

## 2020-10-25 RX ADMIN — IBUPROFEN 800 MG: 400 TABLET ORAL at 21:12

## 2020-10-25 NOTE — PROGRESS NOTES
Progress Note - Infectious Disease   Giovany Lara 34 y o  female MRN: [id number]  Unit/Bed#: -01 Encounter: [id number]      Impression/Recommendations:  1  Probable right septic sacroiliitis  This is most likely secondary to MSSA bacteremia from IVDU  Patient completed 6 week course of IV antibiotic  However, ESR remains elevated  Patient was supposed to be on p o  Cefadroxil until ESR normalizes  However, she has been noncompliant and only took a few days of antibiotic after discharge on 09/27  Continue p o  Keflex  Monitor ESR monthly  Patient should stay on p o  Keflex until ESR normalizes      2  MSSA bacteremia, secondary to IVDU  Patient completed 6 week course of IV antibiotic  No evidence of recurrence  No further antibiotic needed for this  Follow-up on pending blood cultures      3  TV endocarditis, secondary to IVDU  Patient is status post TV repair with annuloplasty  She complete 6 week course of IV antibiotic  No evidence of recurrence  No further antibiotic needed for this      4   Possible UTI, with dysuria  UA is abnormal in urine culture with growth GNR  Patient has no fever/leukocytosis and is systemically well  Keflex as in above  Monitor urinary symptoms  Follow-up on pending urine culture  5  Active IVDU  Patient continues to use IV drugs even after recent admission for bacteremia and endocarditis  She is at very high risk for recurrent bacteremia and endocarditis  I discussed with her in great detail regarding the need to stop IV drugs  Patient is not receptive to discussion      Discussed with patient in detail regarding the above plan  Discussed with slim service earlier  Antibiotics:  Keflex restart # 2     Subjective:  Patient with mild stomach discomfort  No hip pain  Patient with dysuria but no abdominal pain  Temperature stays down  No diarrhea      Objective:  Vitals:  Temp:  [97 4 °F (36 3 °C)] 97 4 °F (36 3 °C)  HR:  [67-68] 68  Resp: [16] 16  BP: ()/(50-74) 121/74  SpO2:  [98 %] 98 %  Temp (24hrs), Av 4 °F (36 3 °C), Min:97 4 °F (36 3 °C), Max:97 4 °F (36 3 °C)  Current: Temperature: (!) 97 4 °F (36 3 °C)    Physical Exam:     General: Awake, alert, cooperative, no distress  Neck:  Supple  No mass  No lymphadenopathy  Lungs: Expansion symmetric, no rales, no wheezing, respirations unlabored  Heart:  Regular rate and rhythm, S1 and S2 normal, no murmur  Abdomen: Soft, nondistended, non-tender, bowel sounds active all four quadrants, no masses, no organomegaly  Extremities: No edema  No erythema/warmth  No ulcer  Nontender to palpation  Skin:  No rash  Neuro: Moves all extremities  Invasive Devices     Peripherally Inserted Central Catheter Line            PICC Line 10/24/20 Left Brachial 1 day                Labs studies:   I have personally reviewed pertinent labs  Results from last 7 days   Lab Units 10/24/20  1157 10/22/20  0928   POTASSIUM mmol/L 4 3 4 0   CHLORIDE mmol/L 111* 108   CO2 mmol/L 26 27   BUN mg/dL 18 6   CREATININE mg/dL 0 74 0 54*   EGFR ml/min/1 73sq m 111 130   CALCIUM mg/dL 9 8 9 8   AST U/L 22 27   ALT U/L 31 29   ALK PHOS U/L 114 124*     Results from last 7 days   Lab Units 10/24/20  1157 10/22/20  0928   WBC Thousand/uL 5 86 5 64   HEMOGLOBIN g/dL 11 1* 11 6   PLATELETS Thousands/uL 415* 410*     Results from last 7 days   Lab Units 10/24/20  1724 10/24/20  1341   BLOOD CULTURE   --  No Growth at 24 hrs  No Growth at 24 hrs  URINE CULTURE  >100,000 cfu/ml Gram Negative Bartolo Enteric Like*  --        Imaging Studies:   I have personally reviewed pertinent imaging study reports and images in PACS  EKG, Pathology, and Other Studies:   I have personally reviewed pertinent reports

## 2020-10-25 NOTE — ASSESSMENT & PLAN NOTE
· Patient has history of IV drug abuse; reports most recently using IV heroin, cocaine, and benzodiazepines from 10/2-10/21  · Patient reports withdrawal symptoms are improving since admission  · Started on Suboxone by acute pain management - patient intermittently refusing suboxone (refused yesterday), clonidine, and all other medications  ·  Acute Pain management consulted for management of withdrawal symptoms   · Continue clonidine 0 1 Q8H, Tylenol 975 mg Q8H, motrin 800 mg q8hrs, gabapentin 300 HS, melatonin 3 mg HS, robaxin 750 mg q8hr  · Continue zofran prn, loperamide prn, HydroxyzIne 50 mg Q6H prn agitation and anxiety   · Continue lorazepam 1 mg PO q6hrs PRN anxiety  · Continue cymbalta, remeron, zyprexa

## 2020-10-25 NOTE — ASSESSMENT & PLAN NOTE
· CT scan performed of the right lower extremity on 8/21/2020 showed a 9 mm collection raising concern for right ilacus muscle abscess sacroiliitis  · MRI performed on 8/31/2020 was noted to show sacroiliitis that was felt to be septic in nature  · MRI done on 9/24/2020 shows no intramuscular abscess  · CRP is elevated on admission  · Patient was discharged with cefadroxil 500 mg p o  Q 12 hours until CRP normalizes and was recommended follow-up with Infectious Disease 2-4 weeks after discharge  · The patient never followed up nor did she take these antibiotics    · ID started PO keflex - should remain on this until ESR normalizes  · Outpatient ESR monthly

## 2020-10-25 NOTE — ASSESSMENT & PLAN NOTE
· Pt has a history of 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  · Continue Eliquis 5 mg for 3 months total (through beginning of January)

## 2020-10-25 NOTE — ASSESSMENT & PLAN NOTE
· History of IV drug abuse  · Underwent tricuspid valve repair 9/10/2020  · Infectious disease consulted   · Discussed with CT surgery 10/24  Incision appears clean and dry and intact  · From their standpoint, continue AC for 3 months total  No need for ASA, statin     · AC for DVT, not surgical intervention

## 2020-10-25 NOTE — ASSESSMENT & PLAN NOTE
· UA consistent with UTI  · Started on keflex for sacroilitis (did not comply with abx regimen on prior discharge)  · Continue keflex and follow up on final urine culture  · Continue pyridium for pain

## 2020-10-25 NOTE — ASSESSMENT & PLAN NOTE
· Patient left AMA on multiple occasion  · Completed antibiotics on 9/27/2020 which was 6 weeks from 1st negative blood culture  · 1 set of blood cultures were obtained at [facility] in [facility] 5 days ago    I confirmed with them 10/24 that this were negative  · Repeat blood cultures pending

## 2020-10-26 VITALS
BODY MASS INDEX: 23.76 KG/M2 | SYSTOLIC BLOOD PRESSURE: 107 MMHG | HEART RATE: 63 BPM | RESPIRATION RATE: 18 BRPM | OXYGEN SATURATION: 98 % | HEIGHT: 65 IN | TEMPERATURE: 98.2 F | DIASTOLIC BLOOD PRESSURE: 58 MMHG | WEIGHT: 142.64 LBS

## 2020-10-26 DIAGNOSIS — B95.61 BACTEREMIA DUE TO STAPHYLOCOCCUS AUREUS: Primary | ICD-10-CM

## 2020-10-26 DIAGNOSIS — R78.81 BACTEREMIA DUE TO STAPHYLOCOCCUS AUREUS: Primary | ICD-10-CM

## 2020-10-26 LAB — BACTERIA UR CULT: ABNORMAL

## 2020-10-26 PROCEDURE — 99239 HOSP IP/OBS DSCHRG MGMT >30: CPT | Performed by: PHYSICIAN ASSISTANT

## 2020-10-26 PROCEDURE — 99232 SBSQ HOSP IP/OBS MODERATE 35: CPT | Performed by: INTERNAL MEDICINE

## 2020-10-26 RX ORDER — CLONIDINE HYDROCHLORIDE 0.1 MG/1
0.1 TABLET ORAL EVERY 8 HOURS SCHEDULED
Qty: 3 TABLET | Refills: 0 | Status: SHIPPED | OUTPATIENT
Start: 2020-10-26 | End: 2021-03-16 | Stop reason: HOSPADM

## 2020-10-26 RX ORDER — PHENAZOPYRIDINE HYDROCHLORIDE 100 MG/1
100 TABLET, FILM COATED ORAL
Qty: 10 TABLET | Refills: 0 | Status: CANCELLED | OUTPATIENT
Start: 2020-10-26

## 2020-10-26 RX ORDER — BUPRENORPHINE AND NALOXONE 2; .5 MG/1; MG/1
2 FILM, SOLUBLE BUCCAL; SUBLINGUAL ONCE
Status: DISCONTINUED | OUTPATIENT
Start: 2020-10-26 | End: 2020-10-26 | Stop reason: HOSPADM

## 2020-10-26 RX ORDER — CEPHALEXIN 500 MG/1
500 CAPSULE ORAL EVERY 6 HOURS SCHEDULED
Qty: 120 CAPSULE | Refills: 0 | Status: SHIPPED | OUTPATIENT
Start: 2020-10-26 | End: 2020-11-25

## 2020-10-26 RX ADMIN — Medication 250 MG: at 09:10

## 2020-10-26 RX ADMIN — MAGNESIUM OXIDE TAB 400 MG (241.3 MG ELEMENTAL MG) 400 MG: 400 (241.3 MG) TAB at 09:13

## 2020-10-26 RX ADMIN — CALCIUM ACETATE 667 MG: 667 CAPSULE ORAL at 09:11

## 2020-10-26 RX ADMIN — ACETAMINOPHEN 975 MG: 325 TABLET, FILM COATED ORAL at 06:37

## 2020-10-26 RX ADMIN — METHOCARBAMOL TABLETS 750 MG: 750 TABLET, COATED ORAL at 06:37

## 2020-10-26 RX ADMIN — Medication 1000 UNITS: at 09:13

## 2020-10-26 RX ADMIN — PHENAZOPYRIDINE 100 MG: 100 TABLET ORAL at 09:13

## 2020-10-26 RX ADMIN — OXYCODONE HYDROCHLORIDE AND ACETAMINOPHEN 500 MG: 500 TABLET ORAL at 09:16

## 2020-10-26 RX ADMIN — FERROUS SULFATE TAB 325 MG (65 MG ELEMENTAL FE) 325 MG: 325 (65 FE) TAB at 09:11

## 2020-10-26 RX ADMIN — APIXABAN 5 MG: 5 TABLET, FILM COATED ORAL at 09:13

## 2020-10-26 RX ADMIN — CEPHALEXIN 500 MG: 500 CAPSULE ORAL at 04:35

## 2020-10-26 RX ADMIN — PANTOPRAZOLE SODIUM 40 MG: 40 TABLET, DELAYED RELEASE ORAL at 06:37

## 2020-10-26 RX ADMIN — IBUPROFEN 800 MG: 400 TABLET ORAL at 06:37

## 2020-10-26 RX ADMIN — CEPHALEXIN 500 MG: 500 CAPSULE ORAL at 09:11

## 2020-10-26 RX ADMIN — DULOXETINE HYDROCHLORIDE 60 MG: 60 CAPSULE, DELAYED RELEASE ORAL at 09:11

## 2020-10-26 RX ADMIN — ASPIRIN 81 MG CHEWABLE TABLET 81 MG: 81 TABLET CHEWABLE at 09:11

## 2020-10-26 RX ADMIN — LORAZEPAM 1 MG: 1 TABLET ORAL at 04:38

## 2020-10-26 NOTE — DISCHARGE SUMMARY
Discharge- Davide Crease 1990, 34 y o  female MRN: [id number]  Unit/Bed#: -01 Encounter: [id number]  Primary Care Provider: Marci Celeste PA-C   Date and time admitted to hospital: 10/21/2020 11:26 PM    * Heroin withdrawal ([facility] [address] )  Assessment & Plan  · Patient has history of IV drug abuse; reports most recently using IV heroin, cocaine, and benzodiazepines from 10/2-10/21  · Patient reports withdrawal symptoms are improving since admission  · Started on Suboxone by acute pain management; patient intermittently refusing suboxone, clonidine, and all other medications throughout admission   ·  Acute Pain management consulted for management of withdrawal symptoms; appreciate discharge recommendations:   · Continue clonidine 0 1 mg q8h x 1 day until outpatient follow-up with PCP tomorrow  · Discontinue gabapentin 300 HS and robaxin 750 mg q8hr  · Continue home dose cymbalta, remeron, zyprexa  · Patient has appointment with PCP for tomorrow 10/27 for suboxone induction and further opioid/analgesic management     Sacroiliitis [facility])  Assessment & Plan  · CT scan performed of the right lower extremity on 8/21/2020 showed a 9 mm collection raising concern for right ilacus muscle abscess sacroiliitis  · MRI performed on 8/31/2020 was noted to show sacroiliitis that was felt to be septic in nature  · MRI done on 9/24/2020 shows no intramuscular abscess  · CRP is elevated on admission  · Patient was discharged with cefadroxil 500 mg p o  Q 12 hours until CRP normalizes and was recommended follow-up with Infectious Disease 2-4 weeks after discharge  · The patient never followed up nor did she take these antibiotics    · Appreciate infectious disease recommendations  · Started on oral Keflex; patient should remain on this until ESR normalizes  · Prescribed 30 day supply at discharge  · Follow-up appointment in 1 month for repeat ESR and further management    Suspected UTI (urinary tract infection)  Assessment & Plan  · UA consistent with UTI  · Started on keflex for sacroilitis (did not comply with abx regimen on prior discharge)  · Final urine culture growing E coli  · Continue keflex as per Infectious Disease  · Patient denies dysuria today, will discontinue Pyridium at discharge    Bacteremia due to Staphylococcus aureus  Assessment & Plan  · Patient left AMA on multiple occasion  · Completed antibiotics on 9/27/2020 which was 6 weeks from 1st negative blood culture  · 1 set of blood cultures were obtained at [facility] in [facility] 5 days ago  Confirmed on 10/24 that these were negative  · Repeat blood cultures negative x 24 hours   · Appreciate ID input     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  Assessment & Plan  · History of IV drug abuse  · Underwent tricuspid valve repair 9/10/2020  · Incision appears clean and dry and intact  · Infectious disease following  · Discussed with CT surgery on 10/24  · From their standpoint, continue AC for 3 months total  No need for ASA, statin      Chronic hepatitis C virus infection ([facility] [address] )  Assessment & Plan  · During last admission was tested for HIV and was negative  · Recommended to have outpatient follow-up with GI    Chronic anticoagulation  Assessment & Plan  · Pt has a history of 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  · Continue Eliquis 5 mg for 3 months total (through beginning of January)    Bipolar 1 disorder (HCC)  Assessment & Plan  · Continue cymbalta 60 mg, remeron 30 mg, zyprexa 5 mg     GERD (gastroesophageal reflux disease)  Assessment & Plan  · Continue pantoprazole 40 mg     Discharging Physician / Practitioner: Jonelle Barboza PA-C  PCP: Pleas Favre, PA-C  Admission Date:   Admission Orders (From admission, onward)     Ordered        10/22/20 1054  Inpatient Admission  Once                   Discharge Date: 10/26/20    Resolved Problems  Date Reviewed: 10/26/2020    None          Consultations During [facility]:  · Infectious disease  · Acute pain service    Procedures Performed:   · PICC line placement on 10/24     Significant Findings / Test Results:   · Chest x-ray on 10/22:  No acute cardiopulmonary disease  · CRP 43  · ESR 34  · Procalcitonin negative  · UA positive for leukocytes, nitrates, bacteria  · Urine culture with > 100,000 CFU per mL E coli  · Blood cultures x2 negative x24 hours    Incidental Findings:   · Urinary tract infection     Test Results Pending at Discharge (will require follow up): · Final results of blood cultures     Outpatient Tests Requested:  · Outpatient follow-up with PCP tomorrow 10/27 at 9:30 a m   · Outpatient follow-up with infectious disease in 1 month for repeat ESR and antibiotic management    Complications:  None    Reason for Admission:  Heroin withdrawal    Hospital Course:     Bernard Ryan is a 34 y o  female patient who originally presented to the hospital on 10/21/2020 due to reported symptoms of heroin withdrawal   On admission patient reports recent fever, chills, night sweats, hand tremors, diarrhea, and chest pain  ED workup revealed elevated CRP and ESR  UA also suggestive of infection given presence of leukocytes, nitrates and bacteria  Patient was supposed to be on antibiotics since last discharge for Staph aureus bacteremia, endocarditis and sacroiliitis however patient did not complete these medications and is unaware of how much she took and when she stopped  ID was consultedand started patient on oral Keflex  They recommended patient continue this antibiotic until her ESR normalizes  Infectious disease office to call patient for follow-up appointment in 1 month for repeat laboratory testing and further management of antibiotic therapy  Patient was evaluated by acute pain service due to withdrawal symptoms  She was managed with Suboxone throughout hospitalization with improvement in symptoms    Patient is to follow-up with her PCP tomorrow for formal Suboxone induction  She was ordered a dose of Suboxone prior to discharge however refused  Patient expressed understanding and is in agreement with plan of care  Please see above list of diagnoses and related plan for additional information  Condition at Discharge: stable     Discharge Day Visit / Exam:     Subjective:  Patient complains of diarrhea withdrawal symptoms and withdrawal symptoms" such as anxiety  She denies urinary symptoms or pain at this time  Vitals: Blood Pressure: 107/58 (10/26/20 0757)  Pulse: 63 (10/25/20 2112)  Temperature: 98 2 °F (36 8 °C) (10/26/20 0757)  Temp Source: Oral (10/25/20 1500)  Respirations: 18 (10/26/20 0757)  Height: 5' 5" (165 1 cm) (10/21/20 2304)  Weight - Scale: 64 7 kg (142 lb 10 2 oz) (10/26/20 0600)  SpO2: 98 % (10/25/20 1500)      Exam:   Physical Exam  Vitals signs and nursing note reviewed  Exam conducted with a chaperone present  Constitutional:       General: She is not in acute distress  Cardiovascular:      Rate and Rhythm: Normal rate and regular rhythm  Heart sounds: No murmur  Pulmonary:      Effort: Pulmonary effort is normal  No respiratory distress  Breath sounds: Normal breath sounds  No wheezing or rales  Abdominal:      General: Abdomen is flat  Bowel sounds are normal  There is no distension  Palpations: Abdomen is soft  Tenderness: There is no abdominal tenderness  Musculoskeletal:      Right lower leg: No edema  Left lower leg: No edema  Skin:     General: Skin is warm and dry  Coloration: Skin is not pale  Findings: No erythema  Neurological:      Mental Status: She is alert and oriented to person, place, and time  Mental status is at baseline  Discharge instructions/Information to patient and family:   See after visit summary for information provided to patient and family        Provisions for Follow-Up Care:  See after visit summary for information related to follow-up care and any pertinent home health orders  Disposition:     Home    For Discharges to [facility] SNF:   · Not Applicable to this Patient - Not Applicable to this Patient    Planned Readmission: no     Discharge Statement:  I spent 35 minutes discharging the patient  This time was spent on the day of discharge  I had direct contact with the patient on the day of discharge  Greater than 50% of the total time was spent examining patient, answering all patient questions, arranging and discussing plan of care with patient as well as directly providing post-discharge instructions  Additional time then spent on discharge activities  Discharge Medications:  See after visit summary for reconciled discharge medications provided to patient and family        ** Please Note: This note has been constructed using a voice recognition system **

## 2020-10-26 NOTE — PROGRESS NOTES
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    Bipolar 1 disorder (HCC)    Chronic hepatitis C virus infection ([facility] )    Chronic anticoagulation    GERD (gastroesophageal reflux disease)    Suspected UTI (urinary tract infection)    Sacroiliitis (HCC)    Oneyda Ayon is a 34 y o  female  Admitted with acute opioid withdrawal syndrome  Treated with Suboxone sublingual films and patient feels better today  Plan:    Acetaminophen 975 mg p o  q 8 hours scheduled   Will give single dose Suboxone 4/1 mg sublingual now before discharge   Patient has a follow-up appointment with her primary care provider tomorrow at 09:30 for an official Suboxone induction   Patient will not be given a prescription for Suboxone at discharge   Continue ibuprofen 800 mg p o  q 8 hours scheduled   Continue clonidine 0 1 mg p o  q 8 hours scheduled times 24 hours   Continue duloxetine 60 mg p o  daily scheduled   Continue Remeron 30 mg p o  HS (home medication)   Continue Zyprexa 5 mg p o  HS scheduled (home medication)   Suggest discontinue methocarbamol and gabapentin at discharge  APS will sign off at this time  Thank you for the consult  All opioids and other analgesics to be written at discretion of primary team  Please call  / [phone] or [place] [facility] (/ between [phone] and on weekends) with questions or concerns    Pain History  Current pain location(s):  Generalized  Pain Scale:   2/10  Quality:  Aching  24 hour history:  Patient states her withdrawal symptoms have improved over the weekend  Is interested in continuing Suboxone for medication assisted treatment for opioid use disorder    Patient being discharged home today and I was able to make an appointment for her with her primary care physician tomorrow who has an x-waiver and will prescribe her Suboxone      Opioid requirement previous 24 hours:  Buprenorphine/naloxone 8/2 mg x1 p o     Meds/Allergies   all current active meds have been reviewed, current meds:   Current Facility-Administered Medications   Medication Dose Route Frequency    acetaminophen (TYLENOL) tablet 650 mg  650 mg Oral Q6H PRN    acetaminophen (TYLENOL) tablet 975 mg  975 mg Oral Q8H [address]    aluminum-magnesium hydroxide-simethicone (MYLANTA) oral suspension 30 mL  30 mL Oral Q6H PRN    apixaban (ELIQUIS) tablet 5 mg  5 mg Oral BID    ascorbic acid (VITAMIN C) tablet 500 mg  500 mg Oral Daily    aspirin chewable tablet 81 mg  81 mg Oral Daily    buprenorphine-naloxone (SUBOXONE) 2-0 5 mg per SL film 2 Film  2 Film Sublingual Once    calcium acetate (PHOSLO) capsule 667 mg  667 mg Oral TID With Meals    cephalexin (KEFLEX) capsule 500 mg  500 mg Oral Q6H [address]    cholecalciferol (VITAMIN D3) tablet 1,000 Units  1,000 Units Oral Daily    cloNIDine (CATAPRES) tablet 0 1 mg  0 1 mg Oral Q8H [address]    docusate sodium (COLACE) capsule 100 mg  100 mg Oral BID    DULoxetine (CYMBALTA) delayed release capsule 60 mg  60 mg Oral Daily    ferrous sulfate tablet 325 mg  325 mg Oral Daily With Breakfast    gabapentin (NEURONTIN) capsule 300 mg  300 mg Oral HS    hydrOXYzine HCL (ATARAX) tablet 25 mg  25 mg Oral Q6H PRN    ibuprofen (MOTRIN) tablet 800 mg  800 mg Oral Q8H [address]    loperamide (IMODIUM) capsule 4 mg  4 mg Oral 4x Daily PRN    LORazepam (ATIVAN) tablet 1 mg  1 mg Oral Q6H PRN    magnesium oxide (MAG-OX) tablet 400 mg  400 mg Oral BID    melatonin tablet 3 mg  3 mg Oral HS    methocarbamol (ROBAXIN) tablet 750 mg  750 mg Oral Q8H [address]    metoprolol tartrate (LOPRESSOR) partial tablet 12 5 mg  12 5 mg Oral Q12H [address]    mirtazapine (REMERON) tablet 30 mg  30 mg Oral HS  OLANZapine (ZyPREXA) tablet 5 mg  5 mg Oral HS    ondansetron (ZOFRAN-ODT) dispersible tablet 4 mg  4 mg Oral Q6H PRN    pantoprazole (PROTONIX) EC tablet 40 mg  40 mg Oral Daily Before Breakfast    phenazopyridine (PYRIDIUM) tablet 100 mg  100 mg Oral TID With Meals    saccharomyces boulardii (FLORASTOR) capsule 250 mg  250 mg Oral BID    senna (SENOKOT) tablet 8 6 mg  8 6 mg Oral BID    and PTA meds:   Prior to Admission Medications   Prescriptions Last Dose Informant Patient Reported? Taking? DULoxetine (CYMBALTA) 60 mg delayed release capsule Past Month at Unknown time  No Yes   Sig: Take 1 capsule (60 mg total) by mouth daily   OLANZapine (ZyPREXA) 5 mg tablet Past Month at Unknown time  No Yes   Sig: Take 1 tablet (5 mg total) by mouth daily at bedtime   apixaban (ELIQUIS) 5 mg Past Month at Unknown time  No Yes   Sig: Take 2 tablets (10mg) BID through AM 10/4 then 1 tablet (5mg) BID for total of 3 months  ascorbic acid (VITAMIN C) 500 MG tablet Past Month at Unknown time  No Yes   Sig: Take 1 tablet (500 mg total) by mouth daily   aspirin 81 mg chewable tablet Past Month at Unknown time  No Yes   Sig: Chew 1 tablet (81 mg total) daily   calcium acetate (PHOSLO) 667 mg capsule Past Month at Unknown time  No Yes   Sig: Take 1 capsule (667 mg total) by mouth 3 (three) times a day with meals   cefadroxil (DURICEF) 500 mg capsule Past Month at Unknown time  No Yes   Sig: Take 1 capsule (500 mg total) by mouth every 12 (twelve) hours   cholecalciferol (VITAMIN D3) 1,000 units tablet Past Month at Unknown time  No Yes   Sig: Take 1 tablet (1,000 Units total) by mouth daily   docusate sodium (COLACE) 100 mg capsule Past Month at Unknown time  No Yes   Sig: Take 1 capsule (100 mg total) by mouth 2 (two) times a day Hold for soft stools     ferrous sulfate 325 (65 Fe) mg tablet Past Month at Unknown time  No Yes   Sig: Take 1 tablet (325 mg total) by mouth daily with breakfast   ibuprofen (MOTRIN) 800 mg tablet   No No   Sig: Take 1 tablet (800 mg total) by mouth every 8 (eight) hours for 14 days   lidocaine (LIDODERM) 5 %   No No   Sig: Apply 2 patches topically daily for 14 days Remove & Discard patch within 12 hours or as directed by MD   magnesium oxide (MAG-OX) 400 mg Past Month at Unknown time  No Yes   Sig: Take 1 tablet (400 mg total) by mouth 2 (two) times a day   methocarbamol (ROBAXIN) 750 mg tablet   No No   Sig: Take 1 tablet (750 mg total) by mouth every 8 (eight) hours for 14 days   metoprolol tartrate (LOPRESSOR) 25 mg tablet Past Month at Unknown time  No Yes   Sig: Take 0 5 tablets (12 5 mg total) by mouth every 12 (twelve) hours   mirtazapine (REMERON) 30 mg tablet Past Month at Unknown time  No Yes   Sig: Take 1 tablet (30 mg total) by mouth daily at bedtime   oxyCODONE (ROXICODONE) 5 mg immediate release tablet Past Month at Unknown time  No Yes   Sig: Take 1 tablet (5 mg total) by mouth every 4 (four) hours as needed for severe pain for up to 5 dosesMax Daily Amount: 30 mg   pantoprazole (PROTONIX) 40 mg tablet Past Month at Unknown time  No Yes   Sig: Take 1 tablet (40 mg total) by mouth daily   polyethylene glycol (MIRALAX) 17 g packet Past Month at Unknown time  No Yes   Sig: Take 17 g by mouth daily Hold for soft stools  saccharomyces boulardii (FLORASTOR) 250 mg capsule Past Month at Unknown time  No Yes   Sig: Take 1 capsule (250 mg total) by mouth 2 (two) times a day Hold for soft stools  senna (SENOKOT) 8 6 mg Past Month at Unknown time  No Yes   Sig: Take 1 tablet (8 6 mg total) by mouth 2 (two) times a day Hold for soft stools        Facility-Administered Medications: None       Allergies   Allergen Reactions    Cat Hair Extract     Dog Epithelium     Latex     Pollen Extract        Objective     Temp:  [97 4 °F (36 3 °C)-98 2 °F (36 8 °C)] 98 2 °F (36 8 °C)  HR:  [63-68] 63  Resp:  [16-18] 18  BP: ()/(44-74) 107/58    Physical Exam  Vitals signs and nursing note reviewed  Constitutional:       General: She is awake  She is not in acute distress  Eyes:      Pupils: Pupils are equal, round, and reactive to light  Cardiovascular:      Rate and Rhythm: Normal rate and regular rhythm  Pulmonary:      Effort: Pulmonary effort is normal       Breath sounds: Normal breath sounds  Skin:     General: Skin is warm and dry  Neurological:      General: No focal deficit present  Mental Status: She is alert and oriented to person, place, and time  GCS: GCS eye subscore is 4  GCS verbal subscore is 5  GCS motor subscore is 6  Motor: No tremor  Psychiatric:         Behavior: Behavior is cooperative  Lab Results:   Results from last 7 days   Lab Units 10/24/20  1157   WBC Thousand/uL 5 86   HEMOGLOBIN g/dL 11 1*   HEMATOCRIT % 35 6   PLATELETS Thousands/uL 415*      Results from last 7 days   Lab Units 10/24/20  1157   POTASSIUM mmol/L 4 3   CHLORIDE mmol/L 111*   CO2 mmol/L 26   BUN mg/dL 18   CREATININE mg/dL 0 74   CALCIUM mg/dL 9 8   ALK PHOS U/L 114   ALT U/L 31   AST U/L 22       Imaging Studies: I have personally reviewed pertinent reports  EKG, Pathology, and Other Studies: I have personally reviewed pertinent reports  Counseling / Coordination of Care  Total floor / unit time spent today 45 minutes  Greater than 50% of total time was spent with the patient and / or family counseling and / or coordination of care  A description of the counseling / coordination of care:  Patient interview, physical examination, review of medical record, review of imaging and laboratory data, development of pain management plan, discussion of pain management plan with patient and primary service  Please note that the APS provides consultative services regarding pain management only    With the exception of ketamine and epidural infusions and except when indicated, final decisions regarding starting or changing doses of analgesic medications are at the discretion of the consulting service  Off hours consultation and/or medication management is generally not available      Jett Farrell PA-C  Acute Pain Service

## 2020-10-26 NOTE — ASSESSMENT & PLAN NOTE
· Patient left AMA on multiple occasion  · Completed antibiotics on 9/27/2020 which was 6 weeks from 1st negative blood culture  · 1 set of blood cultures were obtained at [facility] in [facility] 5 days ago  Confirmed on 10/24 that these were negative    · Repeat blood cultures negative x 24 hours   · Appreciate ID input

## 2020-10-26 NOTE — ASSESSMENT & PLAN NOTE
· Patient has history of IV drug abuse; reports most recently using IV heroin, cocaine, and benzodiazepines from 10/2-10/21  · Patient reports withdrawal symptoms are improving since admission  · Started on Suboxone by acute pain management; patient intermittently refusing suboxone, clonidine, and all other medications throughout admission   ·  Acute Pain management consulted for management of withdrawal symptoms; appreciate discharge recommendations:   · Continue clonidine 0 1 mg q8h x 1 day until outpatient follow-up with PCP tomorrow  · Discontinue gabapentin 300 HS and robaxin 750 mg q8hr  · Continue home dose cymbalta, remeron, zyprexa  · Patient has appointment with PCP for tomorrow 10/27 for suboxone induction and further opioid/analgesic management

## 2020-10-26 NOTE — ASSESSMENT & PLAN NOTE
· CT scan performed of the right lower extremity on 8/21/2020 showed a 9 mm collection raising concern for right ilacus muscle abscess sacroiliitis  · MRI performed on 8/31/2020 was noted to show sacroiliitis that was felt to be septic in nature  · MRI done on 9/24/2020 shows no intramuscular abscess  · CRP is elevated on admission  · Patient was discharged with cefadroxil 500 mg p o  Q 12 hours until CRP normalizes and was recommended follow-up with Infectious Disease 2-4 weeks after discharge  · The patient never followed up nor did she take these antibiotics    · Appreciate infectious disease recommendations  · Started on oral Keflex; patient should remain on this until ESR normalizes  · Prescribed 30 day supply at discharge  · Follow-up appointment in 1 month for repeat ESR and further management

## 2020-10-26 NOTE — PROGRESS NOTES
Progress Note - Infectious Disease   Kiera Lewis 34 y o  female MRN: [id number]  Unit/Bed#: -48 Encounter: [id number]      Impression/Recommendations:  1  Probable right septic sacroiliitis   This is most likely secondary to MSSA bacteremia from IVDU   Patient completed 6 week course of IV antibiotic   However, ESR remains elevated   Patient was supposed to be on p o  Cefadroxil until ESR normalizes   However, she has been noncompliant and only took a few days of antibiotic after discharge on 09/27     Continue p o  Keflex  Monitor ESR monthly  Patient should stay on p o  Keflex until ESR normalizes      2  MSSA bacteremia, secondary to IVDU   Patient completed 6 week course of IV antibiotic   No evidence of recurrence  No further antibiotic needed for this  Follow-up on pending blood cultures      3  TV endocarditis, secondary to IVDU  Rosana Ulloa is status post TV repair with annuloplasty   She complete 6 week course of IV antibiotic   No evidence of recurrence  No further antibiotic needed for this      4   Possible UTI, with dysuria  UA is abnormal in urine culture with growth GNR  Patient has no fever/leukocytosis and is systemically well  Symptoms much improved with Keflex  Keflex as in above  Monitor urinary symptoms  Follow-up on pending urine culture      5  Active IVCHING Ulloa continues to use IV drugs even after recent admission for bacteremia and endocarditis   She is at very high risk for recurrent bacteremia and endocarditis   I discussed with her in great detail regarding the need to stop IV drugs   Patient is not receptive to discussion      Discussed with patient in detail regarding the above plan  Discussed with slim service  Okay for discharge from ID viewpoint  Follow-up with us in 1 month      Antibiotics:  Keflex restart # 3      Subjective:  Patient with mild stomach discomfort  No hip pain  Dysuria much improved  Temperature stays down    No diarrhea  Objective:  Vitals:  Temp:  [97 4 °F (36 3 °C)-98 2 °F (36 8 °C)] 98 2 °F (36 8 °C)  HR:  [63-68] 63  Resp:  [16-18] 18  BP: ()/(44-74) 107/58  SpO2:  [98 %] 98 %  Temp (24hrs), Av 8 °F (36 6 °C), Min:97 4 °F (36 3 °C), Max:98 2 °F (36 8 °C)  Current: Temperature: 98 2 °F (36 8 °C)    Physical Exam:     General: Awake, alert, cooperative, no distress  Neck:  Supple  No mass  No lymphadenopathy  Lungs: Expansion symmetric, no rales, no wheezing, respirations unlabored  Heart:  Regular rate and rhythm, S1 and S2 normal, no murmur  Abdomen: Soft, nondistended, non-tender, bowel sounds active all four quadrants, no masses, no organomegaly  Extremities: No edema  No erythema/warmth  No ulcer  Nontender to palpation  Skin:  No rash  Neuro: Moves all extremities  Invasive Devices     None                 Labs studies:   I have personally reviewed pertinent labs  Results from last 7 days   Lab Units 10/24/20  1157 10/22/20  0928   POTASSIUM mmol/L 4 3 4 0   CHLORIDE mmol/L 111* 108   CO2 mmol/L 26 27   BUN mg/dL 18 6   CREATININE mg/dL 0 74 0 54*   EGFR ml/min/1 73sq m 111 130   CALCIUM mg/dL 9 8 9 8   AST U/L 22 27   ALT U/L 31 29   ALK PHOS U/L 114 124*     Results from last 7 days   Lab Units 10/24/20  1157 10/22/20  0928   WBC Thousand/uL 5 86 5 64   HEMOGLOBIN g/dL 11 1* 11 6   PLATELETS Thousands/uL 415* 410*     Results from last 7 days   Lab Units 10/24/20  1724 10/24/20  1341   BLOOD CULTURE   --  No Growth at 24 hrs  No Growth at 24 hrs  URINE CULTURE  >100,000 cfu/ml Escherichia coli*  --        Imaging Studies:   I have personally reviewed pertinent imaging study reports and images in PACS  EKG, Pathology, and Other Studies:   I have personally reviewed pertinent reports

## 2020-10-26 NOTE — ASSESSMENT & PLAN NOTE
· History of IV drug abuse  · Underwent tricuspid valve repair 9/10/2020  · Incision appears clean and dry and intact  · Infectious disease following  · Discussed with CT surgery on 10/24  · From their standpoint, continue AC for 3 months total  No need for ASA, statin

## 2020-10-26 NOTE — PLAN OF CARE
Problem: PAIN - ADULT  Goal: Verbalizes/displays adequate comfort level or baseline comfort level  Description: Interventions:  - Encourage patient to monitor pain and request assistance  - Assess pain using appropriate pain scale  - Administer analgesics based on type and severity of pain and evaluate response  - Implement non-pharmacological measures as appropriate and evaluate response  - Consider cultural and social influences on pain and pain management  - Notify physician/advanced practitioner if interventions unsuccessful or patient reports new pain  Outcome: Progressing     Problem: INFECTION - ADULT  Goal: Absence or prevention of progression during hospitalization  Description: INTERVENTIONS:  - Assess and monitor for signs and symptoms of infection  - Monitor lab/diagnostic results  - Monitor all insertion sites, i e  indwelling lines, tubes, and drains  - Monitor endotracheal if appropriate and nasal secretions for changes in amount and color  - [place] appropriate cooling/warming therapies per order  - Administer medications as ordered  - Instruct and encourage patient and family to use good hand hygiene technique  - Identify and instruct in appropriate isolation precautions for identified infection/condition  Outcome: Progressing  Goal: Absence of fever/infection during neutropenic period  Description: INTERVENTIONS:  - Monitor WBC    Outcome: Progressing     Problem: METABOLIC, FLUID AND ELECTROLYTES - ADULT  Goal: Electrolytes maintained within normal limits  Description: INTERVENTIONS:  - Monitor labs and assess patient for signs and symptoms of electrolyte imbalances  - Administer electrolyte replacement as ordered  - Monitor response to electrolyte replacements, including repeat lab results as appropriate  - Instruct patient on fluid and nutrition as appropriate  Outcome: Progressing  Goal: Fluid balance maintained  Description: INTERVENTIONS:  - Monitor labs   - Monitor I/O and WT  - Instruct patient on fluid and nutrition as appropriate  - Assess for signs & symptoms of volume excess or deficit  Outcome: Progressing  Goal: Glucose maintained within target range  Description: INTERVENTIONS:  - Monitor Blood Glucose as ordered  - Assess for signs and symptoms of hyperglycemia and hypoglycemia  - Administer ordered medications to maintain glucose within target range  - Assess nutritional intake and initiate nutrition service referral as needed  Outcome: Progressing     Problem: Potential for Falls  Goal: Patient will remain free of falls  Description: INTERVENTIONS:  - Assess patient frequently for physical needs  -  Identify cognitive and physical deficits and behaviors that affect risk of falls    -  [place] fall precautions as indicated by assessment   - Educate patient/family on patient safety including physical limitations  - Instruct patient to call for assistance with activity based on assessment  - Modify environment to reduce risk of injury  - Consider OT/PT consult to assist with strengthening/mobility  Outcome: Progressing

## 2020-10-26 NOTE — NURSING NOTE
Pt sent with discharge material and verbalized understanding  PICC line removed  Pt refused dose of suboxone despite recommedation from [place] and [place]

## 2020-10-26 NOTE — DISCHARGE INSTR - AVS FIRST PAGE
Please attend follow-up appointment with primary care provider scheduled for 10/27 at 9:30 a m  Please continue antibiotic (Keflex) for 30 days    Infectious disease office should call you schedule follow-up appointment for repeat laboratory testing and further antibiotic management

## 2020-10-26 NOTE — PHYSICAL THERAPY NOTE
Physical Therapy Screen Note    Patient's Name: Gurpreet Quijano    Orders received, chart reviewed  Pt admit with heroin withdrawal   Pt has been ambulating independently within room and halls while in hospital  When PT attempted to evaluate pt she reported "Look at me, I'm walking around fine, I don't need PT " Pt does not appear to have any acute PT needs at this time, will D/C PT  If medical status changes and pt presents with physical limitations/impairments please re-consult  Thank you       Jennifer Garland, PT, DPT

## 2020-10-26 NOTE — ASSESSMENT & PLAN NOTE
· UA consistent with UTI  · Started on keflex for sacroilitis (did not comply with abx regimen on prior discharge)  · Final urine culture growing E coli  · Continue keflex as per Infectious Disease  · Patient denies dysuria today, will discontinue Pyridium at discharge

## 2020-10-26 NOTE — OCCUPATIONAL THERAPY NOTE
OCCUPATIONAL THERAPY SCREEN:    ORDERS RECEIVED  CHART REVIEW COMPLETED  PT OBSERVED COMPLETING FUNCTIONAL MOBILITY AND ADLS AT INDEPENDENT LEVEL WITH NO CONCERNS FOR RETURNING HOME; NO ACUTE CARE OT NEEDS AT THIS TIME  D/C OT       Brittany Milan, MOT, OTR/L

## 2020-10-27 NOTE — CASE MANAGEMENT
Open re-attempt:     CM met with the patient and reviewed previous encounter CM intake information  Pt confirmed their have been no changes to her home set up or resources  Due to hx of Heroin use and etoh use, CM offered HOST referral; pt stated, "I know how to take care of my drug and alcohol problems myself"  Pt adamantly declined a HOST referral or to speak with anyone about her drug / alcohol problems  Pt stated that she [facility] to the "[facility]" for her PCP and stated that they help her manage her Bipolar condition  Pt denied any dc needs at this time and stated that her mother was enroute to pick her up  PA-C Held, with SLIM, in room at time of interview and stated pt is medically cleared for discharge

## 2020-10-29 LAB
BACTERIA BLD CULT: NORMAL
BACTERIA BLD CULT: NORMAL

## 2020-11-18 ENCOUNTER — TELEPHONE (OUTPATIENT)
Dept: INFECTIOUS DISEASES | Facility: CLINIC | Age: 30
End: 2020-11-18

## 2020-11-26 ENCOUNTER — TELEPHONE (OUTPATIENT)
Dept: OTHER | Facility: OTHER | Age: 30
End: 2020-11-26

## 2020-12-26 ENCOUNTER — APPOINTMENT (EMERGENCY)
Dept: RADIOLOGY | Facility: HOSPITAL | Age: 30
End: 2020-12-26
Payer: COMMERCIAL

## 2020-12-26 ENCOUNTER — HOSPITAL ENCOUNTER (EMERGENCY)
Facility: HOSPITAL | Age: 30
Discharge: HOME | End: 2020-12-26
Attending: EMERGENCY MEDICINE
Payer: COMMERCIAL

## 2020-12-26 ENCOUNTER — LAB REQUISITION (OUTPATIENT)
Dept: LAB | Facility: HOSPITAL | Age: 30
End: 2020-12-26
Payer: COMMERCIAL

## 2020-12-26 VITALS
OXYGEN SATURATION: 95 % | HEART RATE: 86 BPM | TEMPERATURE: 98.4 F | RESPIRATION RATE: 20 BRPM | DIASTOLIC BLOOD PRESSURE: 60 MMHG | SYSTOLIC BLOOD PRESSURE: 102 MMHG

## 2020-12-26 DIAGNOSIS — A64 STI (SEXUALLY TRANSMITTED INFECTION): Primary | ICD-10-CM

## 2020-12-26 DIAGNOSIS — F19.10 SUBSTANCE ABUSE (HCC): ICD-10-CM

## 2020-12-26 DIAGNOSIS — T40.1X1A: ICD-10-CM

## 2020-12-26 LAB
ALBUMIN SERPL BCP-MCNC: 4.7 G/DL (ref 3.4–4.8)
ALP SERPL-CCNC: 85.9 U/L (ref 35–140)
ALT SERPL W P-5'-P-CCNC: 18 U/L (ref 5–54)
ANION GAP SERPL CALCULATED.3IONS-SCNC: 9 MMOL/L (ref 4–13)
APTT PPP: 24 SECONDS (ref 23–31)
AST SERPL W P-5'-P-CCNC: 21 U/L (ref 15–41)
BASOPHILS # BLD AUTO: 0.03 THOUSANDS/ΜL (ref 0–0.1)
BASOPHILS NFR BLD AUTO: 0 % (ref 0–1)
BILIRUB SERPL-MCNC: 0.34 MG/DL (ref 0.3–1.2)
BILIRUB UR QL STRIP: NEGATIVE
BNP SERPL-MCNC: 23.2 PG/ML (ref 1–100)
BUN SERPL-MCNC: 22 MG/DL (ref 6–20)
CALCIUM SERPL-MCNC: 9.2 MG/DL (ref 8.4–10.2)
CHLORIDE SERPL-SCNC: 107 MMOL/L (ref 96–108)
CLARITY UR: CLEAR
CO2 SERPL-SCNC: 24 MMOL/L (ref 22–33)
COLOR UR: YELLOW
CREAT SERPL-MCNC: 0.78 MG/DL (ref 0.4–1.1)
EOSINOPHIL # BLD AUTO: 0.16 THOUSAND/ΜL (ref 0–0.61)
EOSINOPHIL NFR BLD AUTO: 2 % (ref 0–6)
ERYTHROCYTE [DISTWIDTH] IN BLOOD BY AUTOMATED COUNT: 15 % (ref 11.6–15.1)
EXT PREG TEST URINE: NEGATIVE
EXT. CONTROL ED NAV: NORMAL
GFR SERPL CREATININE-BSD FRML MDRD: 104 ML/MIN/1.73SQ M
GLUCOSE SERPL-MCNC: 87 MG/DL (ref 65–140)
GLUCOSE UR STRIP-MCNC: NEGATIVE MG/DL
HCT VFR BLD AUTO: 40.5 % (ref 34.8–46.1)
HGB BLD-MCNC: 13.7 G/DL (ref 11.5–15.4)
HGB UR QL STRIP.AUTO: NEGATIVE
IMM GRANULOCYTES # BLD AUTO: 0.01 THOUSAND/UL (ref 0–0.2)
IMM GRANULOCYTES NFR BLD AUTO: 0 % (ref 0–2)
INR PPP: 0.99 (ref 0.9–1.1)
KETONES UR STRIP-MCNC: NEGATIVE MG/DL
LACTATE SERPL-SCNC: 0.9 MMOL/L (ref 0–2)
LEUKOCYTE ESTERASE UR QL STRIP: NEGATIVE
LYMPHOCYTES # BLD AUTO: 2.46 THOUSANDS/ΜL (ref 0.6–4.47)
LYMPHOCYTES NFR BLD AUTO: 25 % (ref 14–44)
MCH RBC QN AUTO: 26.6 PG (ref 26.8–34.3)
MCHC RBC AUTO-ENTMCNC: 33.8 G/DL (ref 31.4–37.4)
MCV RBC AUTO: 79 FL (ref 82–98)
MONOCYTES # BLD AUTO: 0.61 THOUSAND/ΜL (ref 0.17–1.22)
MONOCYTES NFR BLD AUTO: 6 % (ref 4–12)
NEUTROPHILS # BLD AUTO: 6.69 THOUSANDS/ΜL (ref 1.85–7.62)
NEUTS SEG NFR BLD AUTO: 67 % (ref 43–75)
NITRITE UR QL STRIP: NEGATIVE
PH UR STRIP.AUTO: 6 [PH]
PLATELET # BLD AUTO: 340 THOUSANDS/UL (ref 149–390)
PMV BLD AUTO: 8.5 FL (ref 8.9–12.7)
POTASSIUM SERPL-SCNC: 4.4 MMOL/L (ref 3.5–5)
PROT SERPL-MCNC: 7.6 G/DL (ref 6.4–8.3)
PROT UR STRIP-MCNC: NEGATIVE MG/DL
PROTHROMBIN TIME: 11.2 SECONDS (ref 9.5–12.1)
RBC # BLD AUTO: 5.16 MILLION/UL (ref 3.81–5.12)
SODIUM SERPL-SCNC: 140 MMOL/L (ref 133–145)
SP GR UR STRIP.AUTO: 1.01 (ref 1–1.03)
TROPONIN I SERPL-MCNC: <0.03 NG/ML (ref 0–0.07)
UROBILINOGEN UR QL STRIP.AUTO: 0.2 E.U./DL
WBC # BLD AUTO: 9.96 THOUSAND/UL (ref 4.31–10.16)

## 2020-12-26 PROCEDURE — 93005 ELECTROCARDIOGRAM TRACING: CPT

## 2020-12-26 PROCEDURE — 85610 PROTHROMBIN TIME: CPT | Performed by: EMERGENCY MEDICINE

## 2020-12-26 PROCEDURE — 85025 COMPLETE CBC W/AUTO DIFF WBC: CPT | Performed by: EMERGENCY MEDICINE

## 2020-12-26 PROCEDURE — 99285 EMERGENCY DEPT VISIT HI MDM: CPT | Performed by: EMERGENCY MEDICINE

## 2020-12-26 PROCEDURE — 99284 EMERGENCY DEPT VISIT MOD MDM: CPT

## 2020-12-26 PROCEDURE — 36415 COLL VENOUS BLD VENIPUNCTURE: CPT | Performed by: EMERGENCY MEDICINE

## 2020-12-26 PROCEDURE — 83880 ASSAY OF NATRIURETIC PEPTIDE: CPT | Performed by: EMERGENCY MEDICINE

## 2020-12-26 PROCEDURE — 81025 URINE PREGNANCY TEST: CPT

## 2020-12-26 PROCEDURE — 83605 ASSAY OF LACTIC ACID: CPT | Performed by: EMERGENCY MEDICINE

## 2020-12-26 PROCEDURE — 71045 X-RAY EXAM CHEST 1 VIEW: CPT

## 2020-12-26 PROCEDURE — 80053 COMPREHEN METABOLIC PANEL: CPT | Performed by: EMERGENCY MEDICINE

## 2020-12-26 PROCEDURE — 85730 THROMBOPLASTIN TIME PARTIAL: CPT | Performed by: EMERGENCY MEDICINE

## 2020-12-26 PROCEDURE — 84484 ASSAY OF TROPONIN QUANT: CPT | Performed by: EMERGENCY MEDICINE

## 2020-12-26 PROCEDURE — 81003 URINALYSIS AUTO W/O SCOPE: CPT | Performed by: EMERGENCY MEDICINE

## 2020-12-26 PROCEDURE — 96372 THER/PROPH/DIAG INJ SC/IM: CPT

## 2020-12-26 PROCEDURE — 84145 PROCALCITONIN (PCT): CPT | Performed by: EMERGENCY MEDICINE

## 2020-12-26 PROCEDURE — 87040 BLOOD CULTURE FOR BACTERIA: CPT | Performed by: EMERGENCY MEDICINE

## 2020-12-26 RX ORDER — AZITHROMYCIN 250 MG/1
1000 TABLET, FILM COATED ORAL ONCE
Status: COMPLETED | OUTPATIENT
Start: 2020-12-26 | End: 2020-12-26

## 2020-12-26 RX ADMIN — AZITHROMYCIN MONOHYDRATE 1000 MG: 250 TABLET ORAL at 22:19

## 2020-12-26 RX ADMIN — LIDOCAINE HYDROCHLORIDE 250 MG: 10 INJECTION, SOLUTION EPIDURAL; INFILTRATION; INTRACAUDAL; PERINEURAL at 22:18

## 2020-12-27 LAB
ATRIAL RATE: 78 BPM
P AXIS: 69 DEGREES
PR INTERVAL: 139 MS
PROCALCITONIN SERPL-MCNC: <0.05 NG/ML
QRS AXIS: 53 DEGREES
QRSD INTERVAL: 87 MS
QT INTERVAL: 371 MS
QTC INTERVAL: 420 MS
T WAVE AXIS: 54 DEGREES
VENTRICULAR RATE: 77 BPM

## 2020-12-27 PROCEDURE — 93010 ELECTROCARDIOGRAM REPORT: CPT | Performed by: INTERNAL MEDICINE

## 2020-12-31 LAB
BACTERIA BLD CULT: NORMAL
BACTERIA BLD CULT: NORMAL

## 2021-03-10 ENCOUNTER — HOSPITAL ENCOUNTER (OUTPATIENT)
Facility: HOSPITAL | Age: 31
Setting detail: OBSERVATION
Discharge: LEFT BEFORE DISCHARGE | End: 2021-03-11
Attending: EMERGENCY MEDICINE | Admitting: INTERNAL MEDICINE
Payer: COMMERCIAL

## 2021-03-10 ENCOUNTER — APPOINTMENT (EMERGENCY)
Dept: RADIOLOGY | Facility: HOSPITAL | Age: 31
End: 2021-03-10
Payer: COMMERCIAL

## 2021-03-10 DIAGNOSIS — F31.9 BIPOLAR 1 DISORDER (HCC): ICD-10-CM

## 2021-03-10 DIAGNOSIS — F19.10 INTRAVENOUS DRUG ABUSE (HCC): Primary | ICD-10-CM

## 2021-03-10 DIAGNOSIS — Z98.890 H/O TRICUSPID VALVE REPAIR: ICD-10-CM

## 2021-03-10 LAB
ALBUMIN SERPL BCP-MCNC: 4 G/DL (ref 3.5–5)
ALP SERPL-CCNC: 105 U/L (ref 46–116)
ALT SERPL W P-5'-P-CCNC: 45 U/L (ref 12–78)
ANION GAP SERPL CALCULATED.3IONS-SCNC: 3 MMOL/L (ref 4–13)
AST SERPL W P-5'-P-CCNC: 40 U/L (ref 5–45)
BASOPHILS # BLD AUTO: 0.02 THOUSANDS/ΜL (ref 0–0.1)
BASOPHILS NFR BLD AUTO: 0 % (ref 0–1)
BILIRUB SERPL-MCNC: 0.6 MG/DL (ref 0.2–1)
BILIRUB UR QL STRIP: NEGATIVE
BUN SERPL-MCNC: 8 MG/DL (ref 5–25)
CALCIUM SERPL-MCNC: 8.9 MG/DL (ref 8.3–10.1)
CHLORIDE SERPL-SCNC: 106 MMOL/L (ref 100–108)
CLARITY UR: CLEAR
CO2 SERPL-SCNC: 28 MMOL/L (ref 21–32)
COLOR UR: NORMAL
CREAT SERPL-MCNC: 0.76 MG/DL (ref 0.6–1.3)
EOSINOPHIL # BLD AUTO: 0.43 THOUSAND/ΜL (ref 0–0.61)
EOSINOPHIL NFR BLD AUTO: 7 % (ref 0–6)
ERYTHROCYTE [DISTWIDTH] IN BLOOD BY AUTOMATED COUNT: 13.2 % (ref 11.6–15.1)
EXT PREG TEST URINE: NEGATIVE
EXT. CONTROL ED NAV: NORMAL
GFR SERPL CREATININE-BSD FRML MDRD: 107 ML/MIN/1.73SQ M
GLUCOSE SERPL-MCNC: 79 MG/DL (ref 65–140)
GLUCOSE UR STRIP-MCNC: NEGATIVE MG/DL
HCT VFR BLD AUTO: 38 % (ref 34.8–46.1)
HGB BLD-MCNC: 13 G/DL (ref 11.5–15.4)
HGB UR QL STRIP.AUTO: NEGATIVE
IMM GRANULOCYTES # BLD AUTO: 0.01 THOUSAND/UL (ref 0–0.2)
IMM GRANULOCYTES NFR BLD AUTO: 0 % (ref 0–2)
KETONES UR STRIP-MCNC: NEGATIVE MG/DL
LEUKOCYTE ESTERASE UR QL STRIP: NEGATIVE
LYMPHOCYTES # BLD AUTO: 2.26 THOUSANDS/ΜL (ref 0.6–4.47)
LYMPHOCYTES NFR BLD AUTO: 36 % (ref 14–44)
MCH RBC QN AUTO: 28.8 PG (ref 26.8–34.3)
MCHC RBC AUTO-ENTMCNC: 34.2 G/DL (ref 31.4–37.4)
MCV RBC AUTO: 84 FL (ref 82–98)
MONOCYTES # BLD AUTO: 0.8 THOUSAND/ΜL (ref 0.17–1.22)
MONOCYTES NFR BLD AUTO: 13 % (ref 4–12)
NEUTROPHILS # BLD AUTO: 2.71 THOUSANDS/ΜL (ref 1.85–7.62)
NEUTS SEG NFR BLD AUTO: 44 % (ref 43–75)
NITRITE UR QL STRIP: NEGATIVE
NRBC BLD AUTO-RTO: 0 /100 WBCS
PH UR STRIP.AUTO: 6.5 [PH]
PLATELET # BLD AUTO: 220 THOUSANDS/UL (ref 149–390)
PMV BLD AUTO: 8.8 FL (ref 8.9–12.7)
POTASSIUM SERPL-SCNC: 3.3 MMOL/L (ref 3.5–5.3)
PROCALCITONIN SERPL-MCNC: 0.06 NG/ML
PROT SERPL-MCNC: 7.5 G/DL (ref 6.4–8.2)
PROT UR STRIP-MCNC: NEGATIVE MG/DL
RBC # BLD AUTO: 4.52 MILLION/UL (ref 3.81–5.12)
SODIUM SERPL-SCNC: 137 MMOL/L (ref 136–145)
SP GR UR STRIP.AUTO: 1.01 (ref 1–1.03)
UROBILINOGEN UR QL STRIP.AUTO: 1 E.U./DL
WBC # BLD AUTO: 6.23 THOUSAND/UL (ref 4.31–10.16)

## 2021-03-10 PROCEDURE — 81003 URINALYSIS AUTO W/O SCOPE: CPT | Performed by: EMERGENCY MEDICINE

## 2021-03-10 PROCEDURE — 87040 BLOOD CULTURE FOR BACTERIA: CPT | Performed by: EMERGENCY MEDICINE

## 2021-03-10 PROCEDURE — 80053 COMPREHEN METABOLIC PANEL: CPT | Performed by: EMERGENCY MEDICINE

## 2021-03-10 PROCEDURE — 81025 URINE PREGNANCY TEST: CPT | Performed by: EMERGENCY MEDICINE

## 2021-03-10 PROCEDURE — 70491 CT SOFT TISSUE NECK W/DYE: CPT

## 2021-03-10 PROCEDURE — 96360 HYDRATION IV INFUSION INIT: CPT

## 2021-03-10 PROCEDURE — 73610 X-RAY EXAM OF ANKLE: CPT

## 2021-03-10 PROCEDURE — G1004 CDSM NDSC: HCPCS

## 2021-03-10 PROCEDURE — 84145 PROCALCITONIN (PCT): CPT | Performed by: EMERGENCY MEDICINE

## 2021-03-10 PROCEDURE — 36415 COLL VENOUS BLD VENIPUNCTURE: CPT | Performed by: EMERGENCY MEDICINE

## 2021-03-10 PROCEDURE — 96361 HYDRATE IV INFUSION ADD-ON: CPT

## 2021-03-10 PROCEDURE — 99285 EMERGENCY DEPT VISIT HI MDM: CPT

## 2021-03-10 PROCEDURE — 73130 X-RAY EXAM OF HAND: CPT

## 2021-03-10 PROCEDURE — 86140 C-REACTIVE PROTEIN: CPT | Performed by: INTERNAL MEDICINE

## 2021-03-10 PROCEDURE — 85025 COMPLETE CBC W/AUTO DIFF WBC: CPT | Performed by: EMERGENCY MEDICINE

## 2021-03-10 PROCEDURE — 99285 EMERGENCY DEPT VISIT HI MDM: CPT | Performed by: EMERGENCY MEDICINE

## 2021-03-10 RX ORDER — KETOROLAC TROMETHAMINE 30 MG/ML
15 INJECTION, SOLUTION INTRAMUSCULAR; INTRAVENOUS ONCE
Status: DISCONTINUED | OUTPATIENT
Start: 2021-03-10 | End: 2021-03-11

## 2021-03-10 RX ORDER — TRAMADOL HYDROCHLORIDE 50 MG/1
50 TABLET ORAL ONCE
Status: DISCONTINUED | OUTPATIENT
Start: 2021-03-10 | End: 2021-03-11

## 2021-03-10 RX ADMIN — SODIUM CHLORIDE 1000 ML: 0.9 INJECTION, SOLUTION INTRAVENOUS at 21:30

## 2021-03-10 RX ADMIN — IOHEXOL 85 ML: 350 INJECTION, SOLUTION INTRAVENOUS at 23:01

## 2021-03-11 VITALS
HEIGHT: 65 IN | DIASTOLIC BLOOD PRESSURE: 46 MMHG | RESPIRATION RATE: 18 BRPM | SYSTOLIC BLOOD PRESSURE: 93 MMHG | OXYGEN SATURATION: 97 % | HEART RATE: 96 BPM | WEIGHT: 139.99 LBS | TEMPERATURE: 98.4 F | BODY MASS INDEX: 23.32 KG/M2

## 2021-03-11 VITALS
SYSTOLIC BLOOD PRESSURE: 96 MMHG | HEART RATE: 80 BPM | OXYGEN SATURATION: 98 % | RESPIRATION RATE: 16 BRPM | DIASTOLIC BLOOD PRESSURE: 56 MMHG | TEMPERATURE: 98.1 F

## 2021-03-11 DIAGNOSIS — S10.95XA: ICD-10-CM

## 2021-03-11 DIAGNOSIS — F19.10 IV DRUG ABUSE (HCC): ICD-10-CM

## 2021-03-11 DIAGNOSIS — F41.9 ANXIETY: ICD-10-CM

## 2021-03-11 DIAGNOSIS — I38 ENDOCARDITIS: Primary | ICD-10-CM

## 2021-03-11 PROBLEM — G93.41 ACUTE METABOLIC ENCEPHALOPATHY: Status: RESOLVED | Noted: 2021-03-11 | Resolved: 2021-03-11

## 2021-03-11 PROBLEM — G93.41 ACUTE METABOLIC ENCEPHALOPATHY: Status: ACTIVE | Noted: 2021-03-11

## 2021-03-11 LAB
ALBUMIN SERPL BCP-MCNC: 3.8 G/DL (ref 3.5–5)
ALP SERPL-CCNC: 118 U/L (ref 46–116)
ALT SERPL W P-5'-P-CCNC: 45 U/L (ref 12–78)
ANION GAP SERPL CALCULATED.3IONS-SCNC: 4 MMOL/L (ref 4–13)
AST SERPL W P-5'-P-CCNC: 41 U/L (ref 5–45)
ATRIAL RATE: 69 BPM
BASOPHILS # BLD AUTO: 0.02 THOUSANDS/ΜL (ref 0–0.1)
BASOPHILS NFR BLD AUTO: 0 % (ref 0–1)
BILIRUB SERPL-MCNC: 0.48 MG/DL (ref 0.2–1)
BUN SERPL-MCNC: 6 MG/DL (ref 5–25)
CALCIUM SERPL-MCNC: 8.6 MG/DL (ref 8.3–10.1)
CHLORIDE SERPL-SCNC: 111 MMOL/L (ref 100–108)
CO2 SERPL-SCNC: 28 MMOL/L (ref 21–32)
CREAT SERPL-MCNC: 0.75 MG/DL (ref 0.6–1.3)
CRP SERPL QL: 118 MG/L
EOSINOPHIL # BLD AUTO: 0.45 THOUSAND/ΜL (ref 0–0.61)
EOSINOPHIL NFR BLD AUTO: 8 % (ref 0–6)
ERYTHROCYTE [DISTWIDTH] IN BLOOD BY AUTOMATED COUNT: 13.2 % (ref 11.6–15.1)
GFR SERPL CREATININE-BSD FRML MDRD: 109 ML/MIN/1.73SQ M
GLUCOSE SERPL-MCNC: 104 MG/DL (ref 65–140)
HCT VFR BLD AUTO: 38.9 % (ref 34.8–46.1)
HGB BLD-MCNC: 13 G/DL (ref 11.5–15.4)
IMM GRANULOCYTES # BLD AUTO: 0.01 THOUSAND/UL (ref 0–0.2)
IMM GRANULOCYTES NFR BLD AUTO: 0 % (ref 0–2)
LACTATE SERPL-SCNC: 2.5 MMOL/L (ref 0.5–2)
LYMPHOCYTES # BLD AUTO: 2.59 THOUSANDS/ΜL (ref 0.6–4.47)
LYMPHOCYTES NFR BLD AUTO: 46 % (ref 14–44)
MCH RBC QN AUTO: 28.8 PG (ref 26.8–34.3)
MCHC RBC AUTO-ENTMCNC: 33.4 G/DL (ref 31.4–37.4)
MCV RBC AUTO: 86 FL (ref 82–98)
MONOCYTES # BLD AUTO: 0.6 THOUSAND/ΜL (ref 0.17–1.22)
MONOCYTES NFR BLD AUTO: 11 % (ref 4–12)
NEUTROPHILS # BLD AUTO: 1.96 THOUSANDS/ΜL (ref 1.85–7.62)
NEUTS SEG NFR BLD AUTO: 35 % (ref 43–75)
NRBC BLD AUTO-RTO: 0 /100 WBCS
P AXIS: 70 DEGREES
PLATELET # BLD AUTO: 218 THOUSANDS/UL (ref 149–390)
PMV BLD AUTO: 8.8 FL (ref 8.9–12.7)
POTASSIUM SERPL-SCNC: 3.3 MMOL/L (ref 3.5–5.3)
PR INTERVAL: 138 MS
PROCALCITONIN SERPL-MCNC: <0.05 NG/ML
PROT SERPL-MCNC: 7.6 G/DL (ref 6.4–8.2)
QRS AXIS: 78 DEGREES
QRSD INTERVAL: 80 MS
QT INTERVAL: 378 MS
QTC INTERVAL: 405 MS
RBC # BLD AUTO: 4.51 MILLION/UL (ref 3.81–5.12)
SODIUM SERPL-SCNC: 143 MMOL/L (ref 136–145)
T WAVE AXIS: 73 DEGREES
VENTRICULAR RATE: 69 BPM
WBC # BLD AUTO: 5.63 THOUSAND/UL (ref 4.31–10.16)

## 2021-03-11 PROCEDURE — 99220 PR INITIAL OBSERVATION CARE/DAY 70 MINUTES: CPT | Performed by: INTERNAL MEDICINE

## 2021-03-11 PROCEDURE — 96361 HYDRATE IV INFUSION ADD-ON: CPT

## 2021-03-11 PROCEDURE — 80053 COMPREHEN METABOLIC PANEL: CPT | Performed by: PHYSICIAN ASSISTANT

## 2021-03-11 PROCEDURE — NC001 PR NO CHARGE: Performed by: INTERNAL MEDICINE

## 2021-03-11 PROCEDURE — 99283 EMERGENCY DEPT VISIT LOW MDM: CPT

## 2021-03-11 PROCEDURE — 85025 COMPLETE CBC W/AUTO DIFF WBC: CPT | Performed by: PHYSICIAN ASSISTANT

## 2021-03-11 PROCEDURE — 87040 BLOOD CULTURE FOR BACTERIA: CPT | Performed by: PHYSICIAN ASSISTANT

## 2021-03-11 PROCEDURE — 93005 ELECTROCARDIOGRAM TRACING: CPT

## 2021-03-11 PROCEDURE — 99255 IP/OBS CONSLTJ NEW/EST HI 80: CPT | Performed by: INTERNAL MEDICINE

## 2021-03-11 PROCEDURE — 96374 THER/PROPH/DIAG INJ IV PUSH: CPT

## 2021-03-11 PROCEDURE — 99284 EMERGENCY DEPT VISIT MOD MDM: CPT | Performed by: PHYSICIAN ASSISTANT

## 2021-03-11 PROCEDURE — 36415 COLL VENOUS BLD VENIPUNCTURE: CPT | Performed by: PHYSICIAN ASSISTANT

## 2021-03-11 PROCEDURE — 96375 TX/PRO/DX INJ NEW DRUG ADDON: CPT

## 2021-03-11 PROCEDURE — 84145 PROCALCITONIN (PCT): CPT | Performed by: PHYSICIAN ASSISTANT

## 2021-03-11 PROCEDURE — 83605 ASSAY OF LACTIC ACID: CPT | Performed by: PHYSICIAN ASSISTANT

## 2021-03-11 RX ORDER — HYDROXYZINE HYDROCHLORIDE 25 MG/1
25 TABLET, FILM COATED ORAL EVERY 6 HOURS PRN
Status: DISCONTINUED | OUTPATIENT
Start: 2021-03-11 | End: 2021-03-11 | Stop reason: HOSPADM

## 2021-03-11 RX ORDER — OLANZAPINE 5 MG/1
5 TABLET ORAL
Status: DISCONTINUED | OUTPATIENT
Start: 2021-03-11 | End: 2021-03-11 | Stop reason: HOSPADM

## 2021-03-11 RX ORDER — LORAZEPAM 2 MG/ML
1 INJECTION INTRAMUSCULAR ONCE
Status: COMPLETED | OUTPATIENT
Start: 2021-03-11 | End: 2021-03-11

## 2021-03-11 RX ORDER — DULOXETIN HYDROCHLORIDE 60 MG/1
60 CAPSULE, DELAYED RELEASE ORAL DAILY
Status: DISCONTINUED | OUTPATIENT
Start: 2021-03-11 | End: 2021-03-11 | Stop reason: HOSPADM

## 2021-03-11 RX ORDER — ONDANSETRON 2 MG/ML
4 INJECTION INTRAMUSCULAR; INTRAVENOUS EVERY 6 HOURS PRN
Status: CANCELLED | OUTPATIENT
Start: 2021-03-11

## 2021-03-11 RX ORDER — CLONIDINE HYDROCHLORIDE 0.1 MG/1
0.1 TABLET ORAL EVERY 8 HOURS SCHEDULED
Status: DISCONTINUED | OUTPATIENT
Start: 2021-03-11 | End: 2021-03-11 | Stop reason: HOSPADM

## 2021-03-11 RX ORDER — OLANZAPINE 10 MG/1
10 TABLET, ORALLY DISINTEGRATING ORAL ONCE
Status: DISCONTINUED | OUTPATIENT
Start: 2021-03-11 | End: 2021-03-11 | Stop reason: HOSPADM

## 2021-03-11 RX ORDER — ONDANSETRON 2 MG/ML
4 INJECTION INTRAMUSCULAR; INTRAVENOUS EVERY 6 HOURS PRN
Status: DISCONTINUED | OUTPATIENT
Start: 2021-03-11 | End: 2021-03-11 | Stop reason: HOSPADM

## 2021-03-11 RX ORDER — LORAZEPAM 0.5 MG/1
2 TABLET ORAL EVERY 8 HOURS
Status: DISCONTINUED | OUTPATIENT
Start: 2021-03-11 | End: 2021-03-11 | Stop reason: HOSPADM

## 2021-03-11 RX ORDER — VANCOMYCIN HYDROCHLORIDE 1 G/200ML
15 INJECTION, SOLUTION INTRAVENOUS ONCE
Status: COMPLETED | OUTPATIENT
Start: 2021-03-11 | End: 2021-03-11

## 2021-03-11 RX ORDER — SODIUM CHLORIDE, SODIUM GLUCONATE, SODIUM ACETATE, POTASSIUM CHLORIDE, MAGNESIUM CHLORIDE, SODIUM PHOSPHATE, DIBASIC, AND POTASSIUM PHOSPHATE .53; .5; .37; .037; .03; .012; .00082 G/100ML; G/100ML; G/100ML; G/100ML; G/100ML; G/100ML; G/100ML
100 INJECTION, SOLUTION INTRAVENOUS CONTINUOUS
Status: DISCONTINUED | OUTPATIENT
Start: 2021-03-11 | End: 2021-03-11 | Stop reason: HOSPADM

## 2021-03-11 RX ORDER — MELATONIN
1000 DAILY
Status: DISCONTINUED | OUTPATIENT
Start: 2021-03-11 | End: 2021-03-11 | Stop reason: HOSPADM

## 2021-03-11 RX ORDER — DULOXETIN HYDROCHLORIDE 60 MG/1
60 CAPSULE, DELAYED RELEASE ORAL DAILY
Status: CANCELLED | OUTPATIENT
Start: 2021-03-11

## 2021-03-11 RX ORDER — ACETAMINOPHEN 325 MG/1
975 TABLET ORAL EVERY 8 HOURS SCHEDULED
Status: DISCONTINUED | OUTPATIENT
Start: 2021-03-11 | End: 2021-03-11 | Stop reason: HOSPADM

## 2021-03-11 RX ORDER — NICOTINE 21 MG/24HR
1 PATCH, TRANSDERMAL 24 HOURS TRANSDERMAL DAILY
Status: DISCONTINUED | OUTPATIENT
Start: 2021-03-11 | End: 2021-03-11 | Stop reason: HOSPADM

## 2021-03-11 RX ORDER — LORAZEPAM 2 MG/ML
2 INJECTION INTRAMUSCULAR ONCE
Status: COMPLETED | OUTPATIENT
Start: 2021-03-11 | End: 2021-03-11

## 2021-03-11 RX ORDER — POTASSIUM CHLORIDE 20 MEQ/1
40 TABLET, EXTENDED RELEASE ORAL ONCE
Status: COMPLETED | OUTPATIENT
Start: 2021-03-11 | End: 2021-03-11

## 2021-03-11 RX ORDER — LOPERAMIDE HYDROCHLORIDE 2 MG/1
4 CAPSULE ORAL 4 TIMES DAILY PRN
Status: DISCONTINUED | OUTPATIENT
Start: 2021-03-11 | End: 2021-03-11 | Stop reason: HOSPADM

## 2021-03-11 RX ORDER — MIRTAZAPINE 30 MG/1
30 TABLET, FILM COATED ORAL
Status: DISCONTINUED | OUTPATIENT
Start: 2021-03-11 | End: 2021-03-11 | Stop reason: HOSPADM

## 2021-03-11 RX ORDER — LORAZEPAM 2 MG/ML
1 INJECTION INTRAMUSCULAR EVERY 8 HOURS PRN
Status: DISCONTINUED | OUTPATIENT
Start: 2021-03-11 | End: 2021-03-11 | Stop reason: HOSPADM

## 2021-03-11 RX ORDER — LANOLIN ALCOHOL/MO/W.PET/CERES
3 CREAM (GRAM) TOPICAL
Status: DISCONTINUED | OUTPATIENT
Start: 2021-03-11 | End: 2021-03-11 | Stop reason: HOSPADM

## 2021-03-11 RX ORDER — GABAPENTIN 300 MG/1
300 CAPSULE ORAL
Status: DISCONTINUED | OUTPATIENT
Start: 2021-03-11 | End: 2021-03-11 | Stop reason: HOSPADM

## 2021-03-11 RX ADMIN — LORAZEPAM 2 MG: 2 INJECTION INTRAMUSCULAR; INTRAVENOUS at 09:51

## 2021-03-11 RX ADMIN — LORAZEPAM 1 MG: 2 INJECTION INTRAMUSCULAR; INTRAVENOUS at 06:03

## 2021-03-11 RX ADMIN — SODIUM CHLORIDE 1000 ML: 0.9 INJECTION, SOLUTION INTRAVENOUS at 09:15

## 2021-03-11 RX ADMIN — LORAZEPAM 1 MG: 2 INJECTION INTRAMUSCULAR; INTRAVENOUS at 09:34

## 2021-03-11 RX ADMIN — CEFEPIME HYDROCHLORIDE 2000 MG: 2 INJECTION, POWDER, FOR SOLUTION INTRAVENOUS at 10:18

## 2021-03-11 RX ADMIN — POTASSIUM CHLORIDE 40 MEQ: 1500 TABLET, EXTENDED RELEASE ORAL at 11:15

## 2021-03-11 RX ADMIN — LORAZEPAM 1 MG: 2 INJECTION INTRAMUSCULAR; INTRAVENOUS at 11:37

## 2021-03-11 RX ADMIN — VANCOMYCIN HYDROCHLORIDE 1000 MG: 1 INJECTION, SOLUTION INTRAVENOUS at 10:58

## 2021-03-11 RX ADMIN — LORAZEPAM 2 MG: 0.5 TABLET ORAL at 11:15

## 2021-03-11 NOTE — ASSESSMENT & PLAN NOTE
· Patient reports accidentally breaking injection needle off in left neck  · CT soft tissue neck revealing "A 1 2 cm needle fragment is seen in the horizontal plane in the AP direction superior to the mid clavicle with a surrounding area of subcutaneous infiltration/early phlegmonous changes extending to the level of the medial clavicle it is approximately 1 0 cm deep to the skin"  · Area with tenderness but without marked erythema    If pain persisting will consider discussion with surgery to determine feasibility/need for I&D
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
## 2021-03-11 NOTE — ASSESSMENT & PLAN NOTE
· In setting of bacterial endocarditis, s/p tricuspid valve repair 09/10/2020  · Follow up results of blood cultures x3 obtain in setting of retained needle

## 2021-03-11 NOTE — ASSESSMENT & PLAN NOTE
· Patient with history of IV drug abuse, has admitted in past using IV heroin, cocaine, benzodiazepines  · Now with encephalopathy, strong consideration in setting of IV drug use  · Will need to  on cessation when patient more awake

## 2021-03-11 NOTE — SEPSIS NOTE
Sepsis Note   Mariano Askew 31 y o  female MRN: [id number]  Unit/Bed#: ED 01 Encounter: [id number]      qSOFA     [address] Name 03/11/21 8026 03/11/21 0832             Altered mental status GCS < 15  0  --       Respiratory Rate > / =22  --  0       Systolic BP < / =800  --  0       Q Sofa Score  0  0           Initial Sepsis Screening     Row Name 03/11/21 1009                Is the patient's history suggestive of a new or worsening infection? No  -DD        Suspected source of infection  --        Are two or more of the following signs & symptoms of infection both present and new to the patient? No  -DD        Indicate SIRS criteria  --        If the answer is yes to both questions, suspicion of sepsis is present  --        If severe sepsis is present AND tissue hypoperfusion perists in the hour after fluid resuscitation or lactate > 4, the patient meets criteria for SEPTIC SHOCK  --        Are any of the following organ dysfunction criteria present within 6 hours of suspected infection and SIRS criteria that are NOT considered to be chronic conditions?   --        Organ dysfunction  --        Date of presentation of severe sepsis  --        Time of presentation of severe sepsis  --        Tissue hypoperfusion persists in the hour after crystalloid fluid administration, evidenced, by either:  --        Was hypotension present within one hour of the conclusion of crystalloid fluid administration?  --        Date of presentation of septic shock  --        Time of presentation of septic shock  --          User Key  (r) = Recorded By, (t) = Taken By, (c) = Cosigned By    [address] Name Provider Type    MATTHEW Spencer PA-C Physician Assistant
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
## 2021-03-11 NOTE — Clinical Note
Case was discussed with Dr Penny Odonnell and the patient's admission status was agreed to be Admission Status: observation status to the service of Dr Penny Odonnell

## 2021-03-11 NOTE — ED NOTES
Pt stated "since I'm being admitted I need to get my stuff out of my car"  Pt does not have IV access  RN allowed pt to run out to car, made clear pt will not be allowed to leave department once IV access is obtained        Laura Goodwin RN  03/10/21 3868

## 2021-03-11 NOTE — ED PROVIDER NOTES
History  Chief Complaint   Patient presents with    Anxiety     pt states she just left an inpatient unit because she was upset and frustrated with her care  She states her anxiety is not being controlled well, she feels very anxious and reports overall pain  This is a 61-year-old female patient who signed out AMA yesterday  She was admitted for having a needle foreign body in the left side of neck and being treated for "endocarditis"  Patient had no prior surgery for a tricuspid replacement on 09/20  Patient is a known IV drug user and states she left yesterday because "I was pissed off because people were looking through my bags" I did review the discharge note regarding the AMA  Patient has pinpoint pupils but is clinically sober but denies recent drug use  She is slightly uncooperative and not differently  They or her chief complaint is "I want to be admitted again to the hospital" she denies any chest pain or shortness of breath no nausea vomiting diarrhea abdominal pain no headache blurred vision double vision no cough congestion sore throat urinary symptoms  She does state that her scar on her chest is painful  Patient is nontoxic in no acute distress  At this time I tiger texted the admitting service to discuss the case  Prior to Admission Medications   Prescriptions Last Dose Informant Patient Reported? Taking? DULoxetine (CYMBALTA) 60 mg delayed release capsule   No No   Sig: Take 1 capsule (60 mg total) by mouth daily   OLANZapine (ZyPREXA) 5 mg tablet   No No   Sig: Take 1 tablet (5 mg total) by mouth daily at bedtime   apixaban (ELIQUIS) 5 mg   No No   Sig: Take 2 tablets (10mg) BID through AM 10/4 then 1 tablet (5mg) BID for total of 3 months     ascorbic acid (VITAMIN C) 500 MG tablet   No No   Sig: Take 1 tablet (500 mg total) by mouth daily   calcium acetate (PHOSLO) 667 mg capsule   No No   Sig: Take 1 capsule (667 mg total) by mouth 3 (three) times a day with meals cholecalciferol (VITAMIN D3) 1,000 units tablet   No No   Sig: Take 1 tablet (1,000 Units total) by mouth daily   cloNIDine (CATAPRES) 0 1 mg tablet   No No   Sig: Take 1 tablet (0 1 mg total) by mouth every 8 (eight) hours for 1 day   docusate sodium (COLACE) 100 mg capsule   No No   Sig: Take 1 capsule (100 mg total) by mouth 2 (two) times a day Hold for soft stools  ferrous sulfate 325 (65 Fe) mg tablet   No No   Sig: Take 1 tablet (325 mg total) by mouth daily with breakfast   ibuprofen (MOTRIN) 800 mg tablet   No No   Sig: Take 1 tablet (800 mg total) by mouth every 8 (eight) hours for 14 days   lidocaine (LIDODERM) 5 %   No No   Sig: Apply 2 patches topically daily for 14 days Remove & Discard patch within 12 hours or as directed by MD   magnesium oxide (MAG-OX) 400 mg   No No   Sig: Take 1 tablet (400 mg total) by mouth 2 (two) times a day   metoprolol tartrate (LOPRESSOR) 25 mg tablet   No No   Sig: Take 0 5 tablets (12 5 mg total) by mouth every 12 (twelve) hours   mirtazapine (REMERON) 30 mg tablet   No No   Sig: Take 1 tablet (30 mg total) by mouth daily at bedtime   pantoprazole (PROTONIX) 40 mg tablet   No No   Sig: Take 1 tablet (40 mg total) by mouth daily   senna (SENOKOT) 8 6 mg   No No   Sig: Take 1 tablet (8 6 mg total) by mouth 2 (two) times a day Hold for soft stools        Facility-Administered Medications: None       Past Medical History:   Diagnosis Date    Abnormal Pap smear of cervix     Anxiety     Depression     Endocarditis     2018    Hepatitis C     HPV (human papilloma virus) anogenital infection        Past Surgical History:   Procedure Laterality Date    IR PICC LINE PLACEMENT DOUBLE LUMEN  8/26/2020    KNEE SURGERY Left     MOUTH SURGERY      OR REPLACE TRICUSPID W CP BYPASS N/A 9/10/2020    Procedure: REPAIR VALVE TRICUSPID with [employer] 30mm 3D Contour  Annuloplasty Ring;  Surgeon: Son Case MD;  [facility]: [facility];  Service: Cardiac Surgery    TOOTH EXTRACTION N/A 2020    Procedure: EXTRACTION TOOTH 32;  Surgeon: Mary Ann Hannah DMD;  Location: [facility];  Service: Maxillofacial       No family history on file  I have reviewed and agree with the history as documented  E-Cigarette/Vaping    E-Cigarette Use Never User      E-Cigarette/Vaping Substances     Social History     Tobacco Use    Smoking status: Current Every Day Smoker     Packs/day: 0 50     Types: Cigarettes     Last attempt to quit: 2016     Years since quittin 3    Smokeless tobacco: Never Used   Substance Use Topics    Alcohol use: Yes     Alcohol/week: 0 0 standard drinks     Frequency: Monthly or less     Binge frequency: Never    Drug use: Yes     Types: Heroin, Marijuana, Benzodiazepines, Cocaine     Comment: last Heroin use in September; used "medical marijuana"        Review of Systems   Constitutional: Negative for fatigue and fever  HENT: Negative for congestion and hearing loss  Eyes: Negative for photophobia and pain  Respiratory: Negative for cough and chest tightness  Cardiovascular: Negative for chest pain and leg swelling  Gastrointestinal: Negative for abdominal pain, diarrhea and nausea  Endocrine: Negative for polydipsia and polyphagia  Genitourinary: Negative for dysuria and frequency  Musculoskeletal: Negative for arthralgias and gait problem  Skin: Negative for pallor and rash  Allergic/Immunologic: Negative for environmental allergies and food allergies  Neurological: Negative for dizziness and headaches  Psychiatric/Behavioral: Negative for agitation and confusion  The patient is nervous/anxious  Physical Exam  Physical Exam  Vitals signs and nursing note reviewed  Constitutional:       Appearance: She is well-developed  HENT:      Head: Normocephalic and atraumatic        Right Ear: Tympanic membrane, ear canal and external ear normal       Left Ear: Tympanic membrane, ear canal and external ear normal       Nose: Nose normal       Mouth/Throat:      Mouth: Mucous membranes are moist       Pharynx: Oropharynx is clear  No oropharyngeal exudate or posterior oropharyngeal erythema  Eyes:      Conjunctiva/sclera: Conjunctivae normal       Pupils: Pupils are equal, round, and reactive to light  Neck:      Musculoskeletal: Normal range of motion and neck supple  Cardiovascular:      Rate and Rhythm: Normal rate and regular rhythm  Pulses: Normal pulses  Comments: Patient has healing incision no infection  Pulmonary:      Effort: Pulmonary effort is normal       Breath sounds: Normal breath sounds  Abdominal:      General: Bowel sounds are normal       Palpations: Abdomen is soft  Tenderness: There is no abdominal tenderness  Musculoskeletal: Normal range of motion  Skin:     General: Skin is warm  Capillary Refill: Capillary refill takes less than 2 seconds  Neurological:      General: No focal deficit present  Mental Status: She is alert  Mental status is at baseline  Psychiatric:         Mood and Affect: Mood is anxious  Speech: Speech is rapid and pressured  Behavior: Behavior is agitated  Thought Content:  Thought content normal          Vital Signs  ED Triage Vitals   Temperature Pulse Respirations Blood Pressure SpO2   03/11/21 0853 03/11/21 0832 03/11/21 0832 03/11/21 0832 03/11/21 0832   98 1 °F (36 7 °C) 78 16 125/85 98 %      Temp Source Heart Rate Source Patient Position - Orthostatic VS BP Location FiO2 (%)   03/11/21 0853 -- -- -- --   Oral          Pain Score       03/11/21 0832       8           Vitals:    03/11/21 0832 03/11/21 1045   BP: 125/85 96/56   Pulse: 78 80         Visual Acuity      ED Medications  Medications   OLANZapine (ZyPREXA ZYDIS) dispersible tablet 10 mg (10 mg Oral Not Given 3/11/21 0930)   multi-electrolyte (PLASMALYTE-A/ISOLYTE-S PH 7 4) IV solution (0 mL/hr Intravenous Stopped 3/11/21 1223)   acetaminophen (TYLENOL) tablet 975 mg (has no administration in time range)   LORazepam (ATIVAN) tablet 2 mg (2 mg Oral Given 3/11/21 1115)   LORazepam (ATIVAN) injection 1 mg (1 mg Intravenous Given 3/11/21 1137)   sodium chloride 0 9 % bolus 1,000 mL (0 mL Intravenous Stopped 3/11/21 1222)   LORazepam (ATIVAN) injection 1 mg (1 mg Intravenous Given 3/11/21 0934)   LORazepam (ATIVAN) injection 2 mg (2 mg Intravenous Given 3/11/21 0951)   vancomycin (VANCOCIN) IVPB (premix in dextrose) 1,000 mg 200 mL (0 mg Intravenous Stopped 3/11/21 1222)   cefepime (MAXIPIME) 2 g/50 mL dextrose IVPB (0 mg Intravenous Stopped 3/11/21 1055)   potassium chloride (K-DUR,KLOR-CON) CR tablet 40 mEq (40 mEq Oral Given 3/11/21 1115)       Diagnostic Studies  Results Reviewed     Procedure Component Value Units Date/Time    Lactic acid [[id number]]  (Abnormal) Collected: 03/11/21 0912    Lab Status: Final result Specimen: Blood from Arm, Right Updated: 03/11/21 1004     LACTIC ACID 2 5 mmol/L     Narrative:      Result may be elevated if tourniquet was used during collection  Lactic acid 2 Hours [[id number]]     Lab Status: No result Specimen: Blood     Procalcitonin with AM Reflex [[id number]] Collected: 03/11/21 0912    Lab Status:  In process Specimen: Blood from Arm, Right Updated: 03/11/21 0954    Comprehensive metabolic panel [[id number]]  (Abnormal) Collected: 03/11/21 0912    Lab Status: Final result Specimen: Blood from Arm, Right Updated: 03/11/21 0952     Sodium 143 mmol/L      Potassium 3 3 mmol/L      Chloride 111 mmol/L      CO2 28 mmol/L      ANION GAP 4 mmol/L      BUN 6 mg/dL      Creatinine 0 75 mg/dL      Glucose 104 mg/dL      Calcium 8 6 mg/dL      AST 41 U/L      ALT 45 U/L      Alkaline Phosphatase 118 U/L      Total Protein 7 6 g/dL      Albumin 3 8 g/dL      Total Bilirubin 0 48 mg/dL      eGFR 109 ml/min/1 73sq m     Narrative:      [place] guidelines for Chronic Kidney Disease (CKD):     Stage 1 with normal or high GFR (GFR > 90 mL/min/1 73 square meters)    Stage 2 Mild CKD (GFR = 60-89 mL/min/1 73 square meters)    Stage 3A Moderate CKD (GFR = 45-59 mL/min/1 73 square meters)    Stage 3B Moderate CKD (GFR = 30-44 mL/min/1 73 square meters)    Stage 4 Severe CKD (GFR = 15-29 mL/min/1 73 square meters)    Stage 5 End Stage CKD (GFR <15 mL/min/1 73 square meters)  Note: GFR calculation is accurate only with a steady state creatinine    CBC and differential [[id number]]  (Abnormal) Collected: 03/11/21 0912    Lab Status: Final result Specimen: Blood from Arm, Right Updated: 03/11/21 0927     WBC 5 63 Thousand/uL      RBC 4 51 Million/uL      Hemoglobin 13 0 g/dL      Hematocrit 38 9 %      MCV 86 fL      MCH 28 8 pg      MCHC 33 4 g/dL      RDW 13 2 %      MPV 8 8 fL      Platelets 570 Thousands/uL      nRBC 0 /100 WBCs      Neutrophils Relative 35 %      Immat GRANS % 0 %      Lymphocytes Relative 46 %      Monocytes Relative 11 %      Eosinophils Relative 8 %      Basophils Relative 0 %      Neutrophils Absolute 1 96 Thousands/µL      Immature Grans Absolute 0 01 Thousand/uL      Lymphocytes Absolute 2 59 Thousands/µL      Monocytes Absolute 0 60 Thousand/µL      Eosinophils Absolute 0 45 Thousand/µL      Basophils Absolute 0 02 Thousands/µL     Blood culture #1 [[id number]] Collected: 03/11/21 0913    Lab Status: In process Specimen: Blood from Arm, Right Updated: 03/11/21 0920    Blood culture #2 [[id number]] Collected: 03/11/21 0912    Lab Status: In process Specimen: Blood from Arm, Right Updated: 03/11/21 0920    UA w Reflex to Microscopic w Reflex to Culture [[id number]]     Lab Status: No result Specimen: Urine     Rapid drug screen, urine [[id number]]     Lab Status: No result Specimen: Urine                  No orders to display              Procedures  Procedures         ED Course  ED Course as of Mar 11 1319   Thu Mar 11, 2021   1009 At this time patient does not have a white count or signs of SIRS  No signs of sepsis  Does have elevated lactic acid could be from drug use with a history of endocarditis I will start antibiotics                            Initial Sepsis Screening     Row Name 03/11/21 1009                Is the patient's history suggestive of a new or worsening infection? No  -DD        Suspected source of infection  --        Are two or more of the following signs & symptoms of infection both present and new to the patient? No  -DD        Indicate SIRS criteria  --        If the answer is yes to both questions, suspicion of sepsis is present  --        If severe sepsis is present AND tissue hypoperfusion perists in the hour after fluid resuscitation or lactate > 4, the patient meets criteria for SEPTIC SHOCK  --        Are any of the following organ dysfunction criteria present within 6 hours of suspected infection and SIRS criteria that are NOT considered to be chronic conditions? --        Organ dysfunction  --        Date of presentation of severe sepsis  --        Time of presentation of severe sepsis  --        Tissue hypoperfusion persists in the hour after crystalloid fluid administration, evidenced, by either:  --        Was hypotension present within one hour of the conclusion of crystalloid fluid administration?  --        Date of presentation of septic shock  --        Time of presentation of septic shock  --          User Key  (r) = Recorded By, (t) = Taken By, (c) = Cosigned By    [address] Name Provider Type    DD Emeka Staton PA-C Physician Assistant          SBIRT 20yo+      Most Recent Value   SBIRT (25 yo +)   In order to provide better care to our patients, we are screening all of our patients for alcohol and drug use  Would it be okay to ask you these screening questions?   No Filed at: 03/11/2021 0901                    MDM    Disposition  Final diagnoses:   Endocarditis   IV drug abuse ([facility] [address] )   Anxiety     Time reflects when diagnosis was documented in both MDM as applicable and the Disposition within this note     Time User Action Codes Description Comment    3/11/2021 10:20 AM Escobar Donato Add [I38] Endocarditis     3/11/2021 10:20 AM Escobar Donato Add [F19 10] IV drug abuse ([address] )     3/11/2021 10:20 AM Escobar Donato Add [F41 9] Anxiety     3/11/2021 10:49 AM Lynsey Dunn Add [S10 95XA] Superficial foreign body of unsp part of neck, init encntr       ED Disposition     ED Disposition Condition Date/Time Comment    JÚNIOR  Thfrankie Mar 11, 2021 12:28 PM Case was discussed with Dr Lazarus Calamity and the patient's admission status was agreed to be Admission Status: observation status to the service of Dr Lazarus Calamity   Pt refused to sign AMA paperwork  Dr Lazarus Calamity is aware of patients request to leave he is not down to s peak with patient but is aware of her request        Follow-up Information    None         Discharge Medication List as of 3/11/2021 12:28 PM      CONTINUE these medications which have NOT CHANGED    Details   apixaban (ELIQUIS) 5 mg Take 2 tablets (10mg) BID through AM 10/4 then 1 tablet (5mg) BID for total of 3 months , Normal      ascorbic acid (VITAMIN C) 500 MG tablet Take 1 tablet (500 mg total) by mouth daily, Starting Sun 9/27/2020, Until Sat 12/26/2020, Normal      calcium acetate (PHOSLO) 667 mg capsule Take 1 capsule (667 mg total) by mouth 3 (three) times a day with meals, Starting Sun 9/27/2020, Normal      cholecalciferol (VITAMIN D3) 1,000 units tablet Take 1 tablet (1,000 Units total) by mouth daily, Starting Sun 9/27/2020, Normal      cloNIDine (CATAPRES) 0 1 mg tablet Take 1 tablet (0 1 mg total) by mouth every 8 (eight) hours for 1 day, Starting Mon 10/26/2020, Until Tue 10/27/2020, Normal      docusate sodium (COLACE) 100 mg capsule Take 1 capsule (100 mg total) by mouth 2 (two) times a day Hold for soft stools  , Starting Sun 9/27/2020, Until Tue 10/27/2020, Normal      DULoxetine (CYMBALTA) 60 mg delayed release capsule Take 1 capsule (60 mg total) by mouth daily, Starting Sun 9/27/2020, Normal      ferrous sulfate 325 (65 Fe) mg tablet Take 1 tablet (325 mg total) by mouth daily with breakfast, Starting Sun 9/27/2020, Until Sat 12/26/2020, Normal      ibuprofen (MOTRIN) 800 mg tablet Take 1 tablet (800 mg total) by mouth every 8 (eight) hours for 14 days, Starting Sun 9/27/2020, Until Sun 10/11/2020, Normal      lidocaine (LIDODERM) 5 % Apply 2 patches topically daily for 14 days Remove & Discard patch within 12 hours or as directed by MD, Starting Mon 9/28/2020, Until Mon 10/12/2020, Normal      magnesium oxide (MAG-OX) 400 mg Take 1 tablet (400 mg total) by mouth 2 (two) times a day, Starting Sun 9/27/2020, Normal      metoprolol tartrate (LOPRESSOR) 25 mg tablet Take 0 5 tablets (12 5 mg total) by mouth every 12 (twelve) hours, Starting Sun 9/27/2020, Normal      mirtazapine (REMERON) 30 mg tablet Take 1 tablet (30 mg total) by mouth daily at bedtime, Starting Sun 9/27/2020, Normal      OLANZapine (ZyPREXA) 5 mg tablet Take 1 tablet (5 mg total) by mouth daily at bedtime, Starting Sun 9/27/2020, Normal      pantoprazole (PROTONIX) 40 mg tablet Take 1 tablet (40 mg total) by mouth daily, Starting Sun 9/27/2020, Until Tue 10/27/2020, Normal      senna (SENOKOT) 8 6 mg Take 1 tablet (8 6 mg total) by mouth 2 (two) times a day Hold for soft stools  , Starting Sun 9/27/2020, Until Tue 10/27/2020, Normal           No discharge procedures on file      PDMP Review       Value Time User    PDMP Reviewed  Yes 3/11/2021  6:02 AM Cherelle Keys DO ED Provider  Electronically Signed by           Gokul Clifford PA-C  03/11/21 2298
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
## 2021-03-11 NOTE — ASSESSMENT & PLAN NOTE
· Had initially presented with complaints of retained needle, however upon returning from car was noted markedly confused and somnolent    Currently only briefly awakens to loud voice before returning to sleep  · Labs unrevealing for etiology and neurologic exam nonfocal; patient with known history of IVDU thus cannot exclude use during this interval  · Continue to closely monitor mental status for improvement, will need to obtain further history once patient more awake

## 2021-03-13 ENCOUNTER — HOSPITAL ENCOUNTER (INPATIENT)
Facility: HOSPITAL | Age: 31
LOS: 3 days | Discharge: LEFT BEFORE DISCHARGE | DRG: 384 | End: 2021-03-16
Attending: EMERGENCY MEDICINE | Admitting: INTERNAL MEDICINE
Payer: COMMERCIAL

## 2021-03-13 DIAGNOSIS — F11.10 OPIATE ABUSE, CONTINUOUS (HCC): ICD-10-CM

## 2021-03-13 DIAGNOSIS — Z18.9 RETAINED FOREIGN BODY: Primary | ICD-10-CM

## 2021-03-13 DIAGNOSIS — L02.91 PHLEGMON: ICD-10-CM

## 2021-03-13 DIAGNOSIS — F41.9 ANXIETY DISORDER: ICD-10-CM

## 2021-03-13 DIAGNOSIS — F31.9 BIPOLAR 1 DISORDER (HCC): ICD-10-CM

## 2021-03-13 LAB
ALBUMIN SERPL BCP-MCNC: 3.7 G/DL (ref 3.5–5)
ALP SERPL-CCNC: 91 U/L (ref 46–116)
ALT SERPL W P-5'-P-CCNC: 43 U/L (ref 12–78)
ANION GAP SERPL CALCULATED.3IONS-SCNC: 6 MMOL/L (ref 4–13)
APTT PPP: 29 SECONDS (ref 23–37)
AST SERPL W P-5'-P-CCNC: 38 U/L (ref 5–45)
BASOPHILS # BLD AUTO: 0.03 THOUSANDS/ΜL (ref 0–0.1)
BASOPHILS NFR BLD AUTO: 1 % (ref 0–1)
BILIRUB SERPL-MCNC: 0.38 MG/DL (ref 0.2–1)
BUN SERPL-MCNC: 9 MG/DL (ref 5–25)
CALCIUM SERPL-MCNC: 8.6 MG/DL (ref 8.3–10.1)
CHLORIDE SERPL-SCNC: 110 MMOL/L (ref 100–108)
CO2 SERPL-SCNC: 28 MMOL/L (ref 21–32)
CREAT SERPL-MCNC: 0.55 MG/DL (ref 0.6–1.3)
EOSINOPHIL # BLD AUTO: 0.35 THOUSAND/ΜL (ref 0–0.61)
EOSINOPHIL NFR BLD AUTO: 8 % (ref 0–6)
ERYTHROCYTE [DISTWIDTH] IN BLOOD BY AUTOMATED COUNT: 13.2 % (ref 11.6–15.1)
GFR SERPL CREATININE-BSD FRML MDRD: 128 ML/MIN/1.73SQ M
GLUCOSE SERPL-MCNC: 80 MG/DL (ref 65–140)
HCT VFR BLD AUTO: 36.4 % (ref 34.8–46.1)
HGB BLD-MCNC: 12.2 G/DL (ref 11.5–15.4)
IMM GRANULOCYTES # BLD AUTO: 0.02 THOUSAND/UL (ref 0–0.2)
IMM GRANULOCYTES NFR BLD AUTO: 0 % (ref 0–2)
INR PPP: 0.97 (ref 0.84–1.19)
LACTATE SERPL-SCNC: 0.9 MMOL/L (ref 0.5–2)
LYMPHOCYTES # BLD AUTO: 2.02 THOUSANDS/ΜL (ref 0.6–4.47)
LYMPHOCYTES NFR BLD AUTO: 44 % (ref 14–44)
MCH RBC QN AUTO: 28.8 PG (ref 26.8–34.3)
MCHC RBC AUTO-ENTMCNC: 33.5 G/DL (ref 31.4–37.4)
MCV RBC AUTO: 86 FL (ref 82–98)
MONOCYTES # BLD AUTO: 0.28 THOUSAND/ΜL (ref 0.17–1.22)
MONOCYTES NFR BLD AUTO: 6 % (ref 4–12)
NEUTROPHILS # BLD AUTO: 1.91 THOUSANDS/ΜL (ref 1.85–7.62)
NEUTS SEG NFR BLD AUTO: 41 % (ref 43–75)
NRBC BLD AUTO-RTO: 0 /100 WBCS
PLATELET # BLD AUTO: 231 THOUSANDS/UL (ref 149–390)
PMV BLD AUTO: 8.4 FL (ref 8.9–12.7)
POTASSIUM SERPL-SCNC: 3.4 MMOL/L (ref 3.5–5.3)
PROCALCITONIN SERPL-MCNC: <0.05 NG/ML
PROT SERPL-MCNC: 7.1 G/DL (ref 6.4–8.2)
PROTHROMBIN TIME: 12.9 SECONDS (ref 11.6–14.5)
RBC # BLD AUTO: 4.24 MILLION/UL (ref 3.81–5.12)
SODIUM SERPL-SCNC: 144 MMOL/L (ref 136–145)
WBC # BLD AUTO: 4.61 THOUSAND/UL (ref 4.31–10.16)

## 2021-03-13 PROCEDURE — 85730 THROMBOPLASTIN TIME PARTIAL: CPT | Performed by: EMERGENCY MEDICINE

## 2021-03-13 PROCEDURE — 99285 EMERGENCY DEPT VISIT HI MDM: CPT | Performed by: EMERGENCY MEDICINE

## 2021-03-13 PROCEDURE — 99284 EMERGENCY DEPT VISIT MOD MDM: CPT

## 2021-03-13 PROCEDURE — 96375 TX/PRO/DX INJ NEW DRUG ADDON: CPT

## 2021-03-13 PROCEDURE — 84145 PROCALCITONIN (PCT): CPT | Performed by: EMERGENCY MEDICINE

## 2021-03-13 PROCEDURE — 85610 PROTHROMBIN TIME: CPT | Performed by: EMERGENCY MEDICINE

## 2021-03-13 PROCEDURE — 87040 BLOOD CULTURE FOR BACTERIA: CPT | Performed by: EMERGENCY MEDICINE

## 2021-03-13 PROCEDURE — 83605 ASSAY OF LACTIC ACID: CPT | Performed by: EMERGENCY MEDICINE

## 2021-03-13 PROCEDURE — 99223 1ST HOSP IP/OBS HIGH 75: CPT | Performed by: PHYSICIAN ASSISTANT

## 2021-03-13 PROCEDURE — 80053 COMPREHEN METABOLIC PANEL: CPT | Performed by: EMERGENCY MEDICINE

## 2021-03-13 PROCEDURE — 93005 ELECTROCARDIOGRAM TRACING: CPT

## 2021-03-13 PROCEDURE — 99254 IP/OBS CNSLTJ NEW/EST MOD 60: CPT | Performed by: SURGERY

## 2021-03-13 PROCEDURE — 36415 COLL VENOUS BLD VENIPUNCTURE: CPT | Performed by: EMERGENCY MEDICINE

## 2021-03-13 PROCEDURE — 96365 THER/PROPH/DIAG IV INF INIT: CPT

## 2021-03-13 PROCEDURE — 85025 COMPLETE CBC W/AUTO DIFF WBC: CPT | Performed by: EMERGENCY MEDICINE

## 2021-03-13 RX ORDER — KETOROLAC TROMETHAMINE 30 MG/ML
15 INJECTION, SOLUTION INTRAMUSCULAR; INTRAVENOUS ONCE
Status: COMPLETED | OUTPATIENT
Start: 2021-03-13 | End: 2021-03-13

## 2021-03-13 RX ORDER — VANCOMYCIN HYDROCHLORIDE 1 G/200ML
15 INJECTION, SOLUTION INTRAVENOUS ONCE
Status: COMPLETED | OUTPATIENT
Start: 2021-03-13 | End: 2021-03-13

## 2021-03-13 RX ORDER — CLONAZEPAM 1 MG/1
1 TABLET ORAL 2 TIMES DAILY
Status: DISCONTINUED | OUTPATIENT
Start: 2021-03-13 | End: 2021-03-15

## 2021-03-13 RX ORDER — VANCOMYCIN HYDROCHLORIDE 1 G/200ML
15 INJECTION, SOLUTION INTRAVENOUS EVERY 8 HOURS
Status: DISCONTINUED | OUTPATIENT
Start: 2021-03-14 | End: 2021-03-15

## 2021-03-13 RX ORDER — OLANZAPINE 5 MG/1
5 TABLET ORAL
Status: DISCONTINUED | OUTPATIENT
Start: 2021-03-13 | End: 2021-03-16

## 2021-03-13 RX ORDER — OXYCODONE HYDROCHLORIDE 10 MG/1
10 TABLET ORAL EVERY 4 HOURS PRN
Status: DISCONTINUED | OUTPATIENT
Start: 2021-03-13 | End: 2021-03-16 | Stop reason: HOSPADM

## 2021-03-13 RX ORDER — DULOXETIN HYDROCHLORIDE 60 MG/1
60 CAPSULE, DELAYED RELEASE ORAL DAILY
Status: DISCONTINUED | OUTPATIENT
Start: 2021-03-14 | End: 2021-03-16 | Stop reason: HOSPADM

## 2021-03-13 RX ORDER — LORAZEPAM 2 MG/ML
1 INJECTION INTRAMUSCULAR ONCE
Status: COMPLETED | OUTPATIENT
Start: 2021-03-13 | End: 2021-03-13

## 2021-03-13 RX ORDER — SODIUM CHLORIDE 9 MG/ML
100 INJECTION, SOLUTION INTRAVENOUS CONTINUOUS
Status: DISCONTINUED | OUTPATIENT
Start: 2021-03-13 | End: 2021-03-14

## 2021-03-13 RX ORDER — PANTOPRAZOLE SODIUM 40 MG/1
40 TABLET, DELAYED RELEASE ORAL
Status: DISCONTINUED | OUTPATIENT
Start: 2021-03-14 | End: 2021-03-16 | Stop reason: HOSPADM

## 2021-03-13 RX ORDER — ACETAMINOPHEN 325 MG/1
650 TABLET ORAL EVERY 6 HOURS PRN
Status: DISCONTINUED | OUTPATIENT
Start: 2021-03-13 | End: 2021-03-16 | Stop reason: HOSPADM

## 2021-03-13 RX ORDER — CLONAZEPAM 0.5 MG/1
0.5 TABLET ORAL ONCE
Status: COMPLETED | OUTPATIENT
Start: 2021-03-13 | End: 2021-03-13

## 2021-03-13 RX ORDER — NICOTINE 21 MG/24HR
14 PATCH, TRANSDERMAL 24 HOURS TRANSDERMAL ONCE
Status: DISCONTINUED | OUTPATIENT
Start: 2021-03-13 | End: 2021-03-16 | Stop reason: HOSPADM

## 2021-03-13 RX ORDER — METHOCARBAMOL 500 MG/1
500 TABLET, FILM COATED ORAL EVERY 6 HOURS PRN
Status: DISCONTINUED | OUTPATIENT
Start: 2021-03-13 | End: 2021-03-16 | Stop reason: HOSPADM

## 2021-03-13 RX ORDER — LORAZEPAM 2 MG/ML
0.5 INJECTION INTRAMUSCULAR EVERY 6 HOURS PRN
Status: DISCONTINUED | OUTPATIENT
Start: 2021-03-13 | End: 2021-03-16 | Stop reason: HOSPADM

## 2021-03-13 RX ORDER — OXYCODONE HYDROCHLORIDE 5 MG/1
5 TABLET ORAL EVERY 4 HOURS PRN
Status: DISCONTINUED | OUTPATIENT
Start: 2021-03-13 | End: 2021-03-16 | Stop reason: HOSPADM

## 2021-03-13 RX ORDER — CLONIDINE HYDROCHLORIDE 0.1 MG/1
0.1 TABLET ORAL EVERY 8 HOURS SCHEDULED
Status: DISCONTINUED | OUTPATIENT
Start: 2021-03-13 | End: 2021-03-16 | Stop reason: HOSPADM

## 2021-03-13 RX ORDER — SENNOSIDES 8.6 MG
8.6 TABLET ORAL 2 TIMES DAILY
Status: DISCONTINUED | OUTPATIENT
Start: 2021-03-13 | End: 2021-03-16 | Stop reason: HOSPADM

## 2021-03-13 RX ORDER — ONDANSETRON 2 MG/ML
4 INJECTION INTRAMUSCULAR; INTRAVENOUS EVERY 6 HOURS PRN
Status: DISCONTINUED | OUTPATIENT
Start: 2021-03-13 | End: 2021-03-16 | Stop reason: HOSPADM

## 2021-03-13 RX ORDER — VANCOMYCIN HYDROCHLORIDE 1 G/200ML
15 INJECTION, SOLUTION INTRAVENOUS EVERY 12 HOURS
Status: DISCONTINUED | OUTPATIENT
Start: 2021-03-13 | End: 2021-03-13

## 2021-03-13 RX ORDER — NICOTINE 21 MG/24HR
1 PATCH, TRANSDERMAL 24 HOURS TRANSDERMAL DAILY
Status: DISCONTINUED | OUTPATIENT
Start: 2021-03-14 | End: 2021-03-16 | Stop reason: HOSPADM

## 2021-03-13 RX ADMIN — KETOROLAC TROMETHAMINE 15 MG: 30 INJECTION, SOLUTION INTRAMUSCULAR at 20:49

## 2021-03-13 RX ADMIN — CLONAZEPAM 1 MG: 1 TABLET ORAL at 22:42

## 2021-03-13 RX ADMIN — CEFEPIME HYDROCHLORIDE 2000 MG: 2 INJECTION, POWDER, FOR SOLUTION INTRAVENOUS at 20:54

## 2021-03-13 RX ADMIN — CLONAZEPAM 0.5 MG: 0.5 TABLET ORAL at 20:49

## 2021-03-13 RX ADMIN — SODIUM CHLORIDE 100 ML/HR: 0.9 INJECTION, SOLUTION INTRAVENOUS at 22:42

## 2021-03-13 RX ADMIN — CLONIDINE HYDROCHLORIDE 0.1 MG: 0.1 TABLET ORAL at 23:32

## 2021-03-13 RX ADMIN — OLANZAPINE 5 MG: 5 TABLET, FILM COATED ORAL at 22:41

## 2021-03-13 RX ADMIN — VANCOMYCIN HYDROCHLORIDE 1000 MG: 1 INJECTION, SOLUTION INTRAVENOUS at 22:39

## 2021-03-13 RX ADMIN — LORAZEPAM 1 MG: 2 INJECTION INTRAMUSCULAR; INTRAVENOUS at 21:40

## 2021-03-14 LAB
ALBUMIN SERPL BCP-MCNC: 3.2 G/DL (ref 3.5–5)
ALP SERPL-CCNC: 95 U/L (ref 46–116)
ALT SERPL W P-5'-P-CCNC: 38 U/L (ref 12–78)
ANION GAP SERPL CALCULATED.3IONS-SCNC: 6 MMOL/L (ref 4–13)
AST SERPL W P-5'-P-CCNC: 30 U/L (ref 5–45)
ATRIAL RATE: 56 BPM
BASOPHILS # BLD AUTO: 0.02 THOUSANDS/ΜL (ref 0–0.1)
BASOPHILS NFR BLD AUTO: 1 % (ref 0–1)
BILIRUB SERPL-MCNC: 0.26 MG/DL (ref 0.2–1)
BILIRUB UR QL STRIP: NEGATIVE
BUN SERPL-MCNC: 8 MG/DL (ref 5–25)
CALCIUM ALBUM COR SERPL-MCNC: 9 MG/DL (ref 8.3–10.1)
CALCIUM SERPL-MCNC: 8.4 MG/DL (ref 8.3–10.1)
CHLORIDE SERPL-SCNC: 112 MMOL/L (ref 100–108)
CLARITY UR: CLEAR
CO2 SERPL-SCNC: 28 MMOL/L (ref 21–32)
COLOR UR: YELLOW
CREAT SERPL-MCNC: 0.6 MG/DL (ref 0.6–1.3)
EOSINOPHIL # BLD AUTO: 0.35 THOUSAND/ΜL (ref 0–0.61)
EOSINOPHIL NFR BLD AUTO: 8 % (ref 0–6)
ERYTHROCYTE [DISTWIDTH] IN BLOOD BY AUTOMATED COUNT: 13.1 % (ref 11.6–15.1)
GFR SERPL CREATININE-BSD FRML MDRD: 124 ML/MIN/1.73SQ M
GLUCOSE SERPL-MCNC: 107 MG/DL (ref 65–140)
GLUCOSE UR STRIP-MCNC: NEGATIVE MG/DL
HCT VFR BLD AUTO: 34.3 % (ref 34.8–46.1)
HGB BLD-MCNC: 11.1 G/DL (ref 11.5–15.4)
HGB UR QL STRIP.AUTO: NEGATIVE
IMM GRANULOCYTES # BLD AUTO: 0.01 THOUSAND/UL (ref 0–0.2)
IMM GRANULOCYTES NFR BLD AUTO: 0 % (ref 0–2)
KETONES UR STRIP-MCNC: NEGATIVE MG/DL
LEUKOCYTE ESTERASE UR QL STRIP: NEGATIVE
LYMPHOCYTES # BLD AUTO: 2.43 THOUSANDS/ΜL (ref 0.6–4.47)
LYMPHOCYTES NFR BLD AUTO: 57 % (ref 14–44)
MCH RBC QN AUTO: 28.7 PG (ref 26.8–34.3)
MCHC RBC AUTO-ENTMCNC: 32.4 G/DL (ref 31.4–37.4)
MCV RBC AUTO: 89 FL (ref 82–98)
MONOCYTES # BLD AUTO: 0.35 THOUSAND/ΜL (ref 0.17–1.22)
MONOCYTES NFR BLD AUTO: 8 % (ref 4–12)
NEUTROPHILS # BLD AUTO: 1.09 THOUSANDS/ΜL (ref 1.85–7.62)
NEUTS SEG NFR BLD AUTO: 26 % (ref 43–75)
NITRITE UR QL STRIP: NEGATIVE
NRBC BLD AUTO-RTO: 0 /100 WBCS
P AXIS: 46 DEGREES
PH UR STRIP.AUTO: 7 [PH]
PLATELET # BLD AUTO: 220 THOUSANDS/UL (ref 149–390)
PMV BLD AUTO: 8.9 FL (ref 8.9–12.7)
POTASSIUM SERPL-SCNC: 3.4 MMOL/L (ref 3.5–5.3)
PR INTERVAL: 146 MS
PROCALCITONIN SERPL-MCNC: <0.05 NG/ML
PROT SERPL-MCNC: 6.4 G/DL (ref 6.4–8.2)
PROT UR STRIP-MCNC: NEGATIVE MG/DL
QRS AXIS: 77 DEGREES
QRSD INTERVAL: 76 MS
QT INTERVAL: 428 MS
QTC INTERVAL: 413 MS
RBC # BLD AUTO: 3.87 MILLION/UL (ref 3.81–5.12)
SODIUM SERPL-SCNC: 146 MMOL/L (ref 136–145)
SP GR UR STRIP.AUTO: 1.01 (ref 1–1.03)
T WAVE AXIS: 75 DEGREES
UROBILINOGEN UR QL STRIP.AUTO: 1 E.U./DL
VENTRICULAR RATE: 56 BPM
WBC # BLD AUTO: 4.25 THOUSAND/UL (ref 4.31–10.16)

## 2021-03-14 PROCEDURE — 81003 URINALYSIS AUTO W/O SCOPE: CPT | Performed by: PHYSICIAN ASSISTANT

## 2021-03-14 PROCEDURE — 80053 COMPREHEN METABOLIC PANEL: CPT | Performed by: PHYSICIAN ASSISTANT

## 2021-03-14 PROCEDURE — 99254 IP/OBS CNSLTJ NEW/EST MOD 60: CPT | Performed by: INTERNAL MEDICINE

## 2021-03-14 PROCEDURE — 99232 SBSQ HOSP IP/OBS MODERATE 35: CPT | Performed by: SURGERY

## 2021-03-14 PROCEDURE — 93010 ELECTROCARDIOGRAM REPORT: CPT | Performed by: INTERNAL MEDICINE

## 2021-03-14 PROCEDURE — 85025 COMPLETE CBC W/AUTO DIFF WBC: CPT | Performed by: PHYSICIAN ASSISTANT

## 2021-03-14 PROCEDURE — 84145 PROCALCITONIN (PCT): CPT | Performed by: PHYSICIAN ASSISTANT

## 2021-03-14 RX ORDER — GINSENG 100 MG
1 CAPSULE ORAL 2 TIMES DAILY
Status: DISCONTINUED | OUTPATIENT
Start: 2021-03-14 | End: 2021-03-16 | Stop reason: HOSPADM

## 2021-03-14 RX ORDER — POTASSIUM CHLORIDE 20 MEQ/1
40 TABLET, EXTENDED RELEASE ORAL ONCE
Status: DISCONTINUED | OUTPATIENT
Start: 2021-03-14 | End: 2021-03-16 | Stop reason: HOSPADM

## 2021-03-14 RX ORDER — DEXTROSE MONOHYDRATE 50 MG/ML
100 INJECTION, SOLUTION INTRAVENOUS CONTINUOUS
Status: DISCONTINUED | OUTPATIENT
Start: 2021-03-14 | End: 2021-03-15

## 2021-03-14 RX ORDER — LORAZEPAM 2 MG/ML
1 INJECTION INTRAMUSCULAR ONCE
Status: COMPLETED | OUTPATIENT
Start: 2021-03-14 | End: 2021-03-14

## 2021-03-14 RX ADMIN — OXYCODONE HYDROCHLORIDE 5 MG: 5 TABLET ORAL at 17:55

## 2021-03-14 RX ADMIN — SENNOSIDES 8.6 MG: 8.6 TABLET, FILM COATED ORAL at 08:09

## 2021-03-14 RX ADMIN — OXYCODONE HYDROCHLORIDE 5 MG: 5 TABLET ORAL at 10:44

## 2021-03-14 RX ADMIN — METHOCARBAMOL 500 MG: 500 TABLET, FILM COATED ORAL at 21:43

## 2021-03-14 RX ADMIN — CEFEPIME HYDROCHLORIDE 2000 MG: 2 INJECTION, POWDER, FOR SOLUTION INTRAVENOUS at 06:37

## 2021-03-14 RX ADMIN — PANTOPRAZOLE SODIUM 40 MG: 40 TABLET, DELAYED RELEASE ORAL at 06:37

## 2021-03-14 RX ADMIN — VANCOMYCIN HYDROCHLORIDE 1000 MG: 1 INJECTION, SOLUTION INTRAVENOUS at 22:52

## 2021-03-14 RX ADMIN — CLONAZEPAM 1 MG: 1 TABLET ORAL at 17:50

## 2021-03-14 RX ADMIN — VANCOMYCIN HYDROCHLORIDE 1000 MG: 1 INJECTION, SOLUTION INTRAVENOUS at 08:09

## 2021-03-14 RX ADMIN — DULOXETINE HYDROCHLORIDE 60 MG: 60 CAPSULE, DELAYED RELEASE ORAL at 08:09

## 2021-03-14 RX ADMIN — OXYCODONE HYDROCHLORIDE 10 MG: 10 TABLET ORAL at 21:43

## 2021-03-14 RX ADMIN — VANCOMYCIN HYDROCHLORIDE 1000 MG: 1 INJECTION, SOLUTION INTRAVENOUS at 15:50

## 2021-03-14 RX ADMIN — CLONAZEPAM 1 MG: 1 TABLET ORAL at 08:09

## 2021-03-14 RX ADMIN — ENOXAPARIN SODIUM 40 MG: 40 INJECTION SUBCUTANEOUS at 08:09

## 2021-03-14 RX ADMIN — LORAZEPAM 1 MG: 2 INJECTION INTRAMUSCULAR; INTRAVENOUS at 10:44

## 2021-03-14 RX ADMIN — LORAZEPAM 0.5 MG: 2 INJECTION INTRAMUSCULAR; INTRAVENOUS at 17:50

## 2021-03-14 RX ADMIN — BACITRACIN 1 SMALL APPLICATION: 500 OINTMENT TOPICAL at 17:51

## 2021-03-15 PROBLEM — L03.221 CELLULITIS OF NECK: Status: ACTIVE | Noted: 2021-03-11

## 2021-03-15 LAB
ANION GAP SERPL CALCULATED.3IONS-SCNC: 4 MMOL/L (ref 4–13)
BASOPHILS # BLD AUTO: 0.02 THOUSANDS/ΜL (ref 0–0.1)
BASOPHILS NFR BLD AUTO: 0 % (ref 0–1)
BUN SERPL-MCNC: 8 MG/DL (ref 5–25)
CALCIUM SERPL-MCNC: 8.4 MG/DL (ref 8.3–10.1)
CHLORIDE SERPL-SCNC: 109 MMOL/L (ref 100–108)
CO2 SERPL-SCNC: 28 MMOL/L (ref 21–32)
CREAT SERPL-MCNC: 0.57 MG/DL (ref 0.6–1.3)
EOSINOPHIL # BLD AUTO: 0.39 THOUSAND/ΜL (ref 0–0.61)
EOSINOPHIL NFR BLD AUTO: 8 % (ref 0–6)
ERYTHROCYTE [DISTWIDTH] IN BLOOD BY AUTOMATED COUNT: 13.1 % (ref 11.6–15.1)
GFR SERPL CREATININE-BSD FRML MDRD: 127 ML/MIN/1.73SQ M
GLUCOSE SERPL-MCNC: 117 MG/DL (ref 65–140)
HCT VFR BLD AUTO: 33.8 % (ref 34.8–46.1)
HGB BLD-MCNC: 11.2 G/DL (ref 11.5–15.4)
IMM GRANULOCYTES # BLD AUTO: 0.01 THOUSAND/UL (ref 0–0.2)
IMM GRANULOCYTES NFR BLD AUTO: 0 % (ref 0–2)
LYMPHOCYTES # BLD AUTO: 2.17 THOUSANDS/ΜL (ref 0.6–4.47)
LYMPHOCYTES NFR BLD AUTO: 45 % (ref 14–44)
MCH RBC QN AUTO: 28.9 PG (ref 26.8–34.3)
MCHC RBC AUTO-ENTMCNC: 33.1 G/DL (ref 31.4–37.4)
MCV RBC AUTO: 87 FL (ref 82–98)
MONOCYTES # BLD AUTO: 0.36 THOUSAND/ΜL (ref 0.17–1.22)
MONOCYTES NFR BLD AUTO: 8 % (ref 4–12)
NEUTROPHILS # BLD AUTO: 1.85 THOUSANDS/ΜL (ref 1.85–7.62)
NEUTS SEG NFR BLD AUTO: 39 % (ref 43–75)
NRBC BLD AUTO-RTO: 0 /100 WBCS
PLATELET # BLD AUTO: 236 THOUSANDS/UL (ref 149–390)
PMV BLD AUTO: 9.1 FL (ref 8.9–12.7)
POTASSIUM SERPL-SCNC: 4.2 MMOL/L (ref 3.5–5.3)
RBC # BLD AUTO: 3.88 MILLION/UL (ref 3.81–5.12)
SODIUM SERPL-SCNC: 141 MMOL/L (ref 136–145)
VANCOMYCIN TROUGH SERPL-MCNC: 23.4 UG/ML (ref 10–20)
WBC # BLD AUTO: 4.8 THOUSAND/UL (ref 4.31–10.16)

## 2021-03-15 PROCEDURE — 80048 BASIC METABOLIC PNL TOTAL CA: CPT | Performed by: INTERNAL MEDICINE

## 2021-03-15 PROCEDURE — 85025 COMPLETE CBC W/AUTO DIFF WBC: CPT | Performed by: INTERNAL MEDICINE

## 2021-03-15 PROCEDURE — 80202 ASSAY OF VANCOMYCIN: CPT | Performed by: INTERNAL MEDICINE

## 2021-03-15 PROCEDURE — 99232 SBSQ HOSP IP/OBS MODERATE 35: CPT | Performed by: INTERNAL MEDICINE

## 2021-03-15 RX ORDER — DIAZEPAM 5 MG/ML
5 INJECTION, SOLUTION INTRAMUSCULAR; INTRAVENOUS ONCE
Status: COMPLETED | OUTPATIENT
Start: 2021-03-15 | End: 2021-03-15

## 2021-03-15 RX ORDER — CLONAZEPAM 1 MG/1
1 TABLET ORAL 2 TIMES DAILY
Status: DISCONTINUED | OUTPATIENT
Start: 2021-03-15 | End: 2021-03-16 | Stop reason: HOSPADM

## 2021-03-15 RX ORDER — POLYETHYLENE GLYCOL 3350 17 G/17G
17 POWDER, FOR SOLUTION ORAL DAILY PRN
Status: DISCONTINUED | OUTPATIENT
Start: 2021-03-15 | End: 2021-03-16 | Stop reason: HOSPADM

## 2021-03-15 RX ORDER — DOCUSATE SODIUM 100 MG/1
100 CAPSULE, LIQUID FILLED ORAL 2 TIMES DAILY
Status: DISCONTINUED | OUTPATIENT
Start: 2021-03-15 | End: 2021-03-16 | Stop reason: HOSPADM

## 2021-03-15 RX ADMIN — NICOTINE 1 PATCH: 21 PATCH, EXTENDED RELEASE TRANSDERMAL at 09:03

## 2021-03-15 RX ADMIN — DULOXETINE HYDROCHLORIDE 60 MG: 60 CAPSULE, DELAYED RELEASE ORAL at 09:00

## 2021-03-15 RX ADMIN — OXYCODONE HYDROCHLORIDE 10 MG: 10 TABLET ORAL at 10:47

## 2021-03-15 RX ADMIN — LORAZEPAM 0.5 MG: 2 INJECTION INTRAMUSCULAR; INTRAVENOUS at 22:56

## 2021-03-15 RX ADMIN — CLONIDINE HYDROCHLORIDE 0.1 MG: 0.1 TABLET ORAL at 22:12

## 2021-03-15 RX ADMIN — CLONAZEPAM 1 MG: 1 TABLET ORAL at 20:29

## 2021-03-15 RX ADMIN — ENOXAPARIN SODIUM 40 MG: 40 INJECTION SUBCUTANEOUS at 09:04

## 2021-03-15 RX ADMIN — DIAZEPAM 5 MG: 10 INJECTION, SOLUTION INTRAMUSCULAR; INTRAVENOUS at 20:56

## 2021-03-15 RX ADMIN — METHOCARBAMOL 500 MG: 500 TABLET, FILM COATED ORAL at 05:15

## 2021-03-15 RX ADMIN — CLONAZEPAM 1 MG: 1 TABLET ORAL at 17:35

## 2021-03-15 RX ADMIN — BACITRACIN 1 SMALL APPLICATION: 500 OINTMENT TOPICAL at 17:35

## 2021-03-15 RX ADMIN — LORAZEPAM 0.5 MG: 2 INJECTION INTRAMUSCULAR; INTRAVENOUS at 00:14

## 2021-03-15 RX ADMIN — LORAZEPAM 0.5 MG: 2 INJECTION INTRAMUSCULAR; INTRAVENOUS at 16:33

## 2021-03-15 RX ADMIN — SENNOSIDES 8.6 MG: 8.6 TABLET, FILM COATED ORAL at 09:00

## 2021-03-15 RX ADMIN — PANTOPRAZOLE SODIUM 40 MG: 40 TABLET, DELAYED RELEASE ORAL at 05:08

## 2021-03-15 RX ADMIN — SENNOSIDES 8.6 MG: 8.6 TABLET, FILM COATED ORAL at 17:36

## 2021-03-15 RX ADMIN — LORAZEPAM 0.5 MG: 2 INJECTION INTRAMUSCULAR; INTRAVENOUS at 10:40

## 2021-03-15 RX ADMIN — VANCOMYCIN HYDROCHLORIDE 1000 MG: 1 INJECTION, SOLUTION INTRAVENOUS at 15:45

## 2021-03-15 RX ADMIN — CLONIDINE HYDROCHLORIDE 0.1 MG: 0.1 TABLET ORAL at 15:13

## 2021-03-15 RX ADMIN — CEFEPIME HYDROCHLORIDE 2000 MG: 2 INJECTION, POWDER, FOR SOLUTION INTRAVENOUS at 05:09

## 2021-03-15 RX ADMIN — BACITRACIN 1 SMALL APPLICATION: 500 OINTMENT TOPICAL at 09:00

## 2021-03-15 RX ADMIN — OXYCODONE HYDROCHLORIDE 10 MG: 10 TABLET ORAL at 05:15

## 2021-03-15 RX ADMIN — CLONAZEPAM 1 MG: 1 TABLET ORAL at 09:00

## 2021-03-15 RX ADMIN — OXYCODONE HYDROCHLORIDE 10 MG: 10 TABLET ORAL at 17:34

## 2021-03-15 RX ADMIN — VANCOMYCIN HYDROCHLORIDE 1000 MG: 1 INJECTION, SOLUTION INTRAVENOUS at 08:59

## 2021-03-16 VITALS
DIASTOLIC BLOOD PRESSURE: 70 MMHG | RESPIRATION RATE: 16 BRPM | OXYGEN SATURATION: 99 % | SYSTOLIC BLOOD PRESSURE: 114 MMHG | TEMPERATURE: 98 F | HEART RATE: 52 BPM

## 2021-03-16 PROBLEM — Z53.29 LEFT AGAINST MEDICAL ADVICE: Status: ACTIVE | Noted: 2021-03-16

## 2021-03-16 PROBLEM — F41.1 GENERALIZED ANXIETY DISORDER: Status: ACTIVE | Noted: 2021-03-16

## 2021-03-16 LAB
ANION GAP SERPL CALCULATED.3IONS-SCNC: 4 MMOL/L (ref 4–13)
BACTERIA BLD CULT: NORMAL
BASOPHILS # BLD AUTO: 0.02 THOUSANDS/ΜL (ref 0–0.1)
BASOPHILS NFR BLD AUTO: 0 % (ref 0–1)
BUN SERPL-MCNC: 7 MG/DL (ref 5–25)
CALCIUM SERPL-MCNC: 8.7 MG/DL (ref 8.3–10.1)
CHLORIDE SERPL-SCNC: 108 MMOL/L (ref 100–108)
CO2 SERPL-SCNC: 29 MMOL/L (ref 21–32)
CREAT SERPL-MCNC: 0.57 MG/DL (ref 0.6–1.3)
EOSINOPHIL # BLD AUTO: 0.45 THOUSAND/ΜL (ref 0–0.61)
EOSINOPHIL NFR BLD AUTO: 9 % (ref 0–6)
ERYTHROCYTE [DISTWIDTH] IN BLOOD BY AUTOMATED COUNT: 12.9 % (ref 11.6–15.1)
GFR SERPL CREATININE-BSD FRML MDRD: 127 ML/MIN/1.73SQ M
GLUCOSE SERPL-MCNC: 122 MG/DL (ref 65–140)
HCT VFR BLD AUTO: 34.4 % (ref 34.8–46.1)
HGB BLD-MCNC: 11.3 G/DL (ref 11.5–15.4)
IMM GRANULOCYTES # BLD AUTO: 0.03 THOUSAND/UL (ref 0–0.2)
IMM GRANULOCYTES NFR BLD AUTO: 1 % (ref 0–2)
LYMPHOCYTES # BLD AUTO: 2.58 THOUSANDS/ΜL (ref 0.6–4.47)
LYMPHOCYTES NFR BLD AUTO: 55 % (ref 14–44)
MCH RBC QN AUTO: 28.5 PG (ref 26.8–34.3)
MCHC RBC AUTO-ENTMCNC: 32.8 G/DL (ref 31.4–37.4)
MCV RBC AUTO: 87 FL (ref 82–98)
MONOCYTES # BLD AUTO: 0.29 THOUSAND/ΜL (ref 0.17–1.22)
MONOCYTES NFR BLD AUTO: 6 % (ref 4–12)
NEUTROPHILS # BLD AUTO: 1.4 THOUSANDS/ΜL (ref 1.85–7.62)
NEUTS SEG NFR BLD AUTO: 29 % (ref 43–75)
NRBC BLD AUTO-RTO: 0 /100 WBCS
PLATELET # BLD AUTO: 253 THOUSANDS/UL (ref 149–390)
PMV BLD AUTO: 9.1 FL (ref 8.9–12.7)
POTASSIUM SERPL-SCNC: 3.9 MMOL/L (ref 3.5–5.3)
RBC # BLD AUTO: 3.96 MILLION/UL (ref 3.81–5.12)
SODIUM SERPL-SCNC: 141 MMOL/L (ref 136–145)
WBC # BLD AUTO: 4.77 THOUSAND/UL (ref 4.31–10.16)

## 2021-03-16 PROCEDURE — 85025 COMPLETE CBC W/AUTO DIFF WBC: CPT | Performed by: PHYSICIAN ASSISTANT

## 2021-03-16 PROCEDURE — 99232 SBSQ HOSP IP/OBS MODERATE 35: CPT | Performed by: INTERNAL MEDICINE

## 2021-03-16 PROCEDURE — 99253 IP/OBS CNSLTJ NEW/EST LOW 45: CPT | Performed by: PSYCHIATRY & NEUROLOGY

## 2021-03-16 PROCEDURE — 80048 BASIC METABOLIC PNL TOTAL CA: CPT | Performed by: PHYSICIAN ASSISTANT

## 2021-03-16 PROCEDURE — 99232 SBSQ HOSP IP/OBS MODERATE 35: CPT | Performed by: PHYSICIAN ASSISTANT

## 2021-03-16 PROCEDURE — 99239 HOSP IP/OBS DSCHRG MGMT >30: CPT | Performed by: NURSE PRACTITIONER

## 2021-03-16 RX ORDER — GABAPENTIN 100 MG/1
100 CAPSULE ORAL 3 TIMES DAILY
Status: DISCONTINUED | OUTPATIENT
Start: 2021-03-16 | End: 2021-03-16 | Stop reason: HOSPADM

## 2021-03-16 RX ORDER — QUETIAPINE FUMARATE 25 MG/1
50 TABLET, FILM COATED ORAL
Status: DISCONTINUED | OUTPATIENT
Start: 2021-03-16 | End: 2021-03-16 | Stop reason: HOSPADM

## 2021-03-16 RX ADMIN — VANCOMYCIN HYDROCHLORIDE 750 MG: 750 INJECTION, SOLUTION INTRAVENOUS at 17:54

## 2021-03-16 RX ADMIN — LORAZEPAM 0.5 MG: 2 INJECTION INTRAMUSCULAR; INTRAVENOUS at 11:49

## 2021-03-16 RX ADMIN — LORAZEPAM 0.5 MG: 2 INJECTION INTRAMUSCULAR; INTRAVENOUS at 17:52

## 2021-03-16 RX ADMIN — OXYCODONE HYDROCHLORIDE 10 MG: 10 TABLET ORAL at 01:58

## 2021-03-16 RX ADMIN — OXYCODONE HYDROCHLORIDE 10 MG: 10 TABLET ORAL at 11:55

## 2021-03-16 RX ADMIN — SENNOSIDES 8.6 MG: 8.6 TABLET, FILM COATED ORAL at 09:26

## 2021-03-16 RX ADMIN — VANCOMYCIN HYDROCHLORIDE 750 MG: 750 INJECTION, SOLUTION INTRAVENOUS at 02:01

## 2021-03-16 RX ADMIN — DULOXETINE HYDROCHLORIDE 60 MG: 60 CAPSULE, DELAYED RELEASE ORAL at 09:26

## 2021-03-16 RX ADMIN — LORAZEPAM 0.5 MG: 2 INJECTION INTRAMUSCULAR; INTRAVENOUS at 05:54

## 2021-03-16 RX ADMIN — ENOXAPARIN SODIUM 40 MG: 40 INJECTION SUBCUTANEOUS at 09:26

## 2021-03-16 RX ADMIN — CLONAZEPAM 1 MG: 1 TABLET ORAL at 17:51

## 2021-03-16 RX ADMIN — PANTOPRAZOLE SODIUM 40 MG: 40 TABLET, DELAYED RELEASE ORAL at 05:54

## 2021-03-16 RX ADMIN — GABAPENTIN 100 MG: 100 CAPSULE ORAL at 16:52

## 2021-03-16 RX ADMIN — CLONAZEPAM 1 MG: 1 TABLET ORAL at 09:26

## 2021-03-16 RX ADMIN — CLONIDINE HYDROCHLORIDE 0.1 MG: 0.1 TABLET ORAL at 16:52

## 2021-03-16 RX ADMIN — NICOTINE 1 PATCH: 21 PATCH, EXTENDED RELEASE TRANSDERMAL at 09:26

## 2021-03-16 RX ADMIN — VANCOMYCIN HYDROCHLORIDE 750 MG: 750 INJECTION, SOLUTION INTRAVENOUS at 09:31

## 2021-03-16 RX ADMIN — BACITRACIN 1 SMALL APPLICATION: 500 OINTMENT TOPICAL at 17:51

## 2021-03-16 RX ADMIN — SENNOSIDES 8.6 MG: 8.6 TABLET, FILM COATED ORAL at 17:51

## 2021-03-16 RX ADMIN — BACITRACIN 1 SMALL APPLICATION: 500 OINTMENT TOPICAL at 09:26

## 2021-03-17 ENCOUNTER — HOSPITAL ENCOUNTER (INPATIENT)
Facility: HOSPITAL | Age: 31
LOS: 4 days | Discharge: HOME | DRG: 351 | End: 2021-03-21
Attending: EMERGENCY MEDICINE | Admitting: INTERNAL MEDICINE
Payer: COMMERCIAL

## 2021-03-17 ENCOUNTER — APPOINTMENT (INPATIENT)
Dept: ULTRASOUND IMAGING | Facility: HOSPITAL | Age: 31
DRG: 351 | End: 2021-03-17
Payer: COMMERCIAL

## 2021-03-17 ENCOUNTER — APPOINTMENT (INPATIENT)
Dept: NON INVASIVE DIAGNOSTICS | Facility: HOSPITAL | Age: 31
DRG: 351 | End: 2021-03-17
Payer: COMMERCIAL

## 2021-03-17 DIAGNOSIS — I82.A12 DVT OF AXILLARY VEIN, ACUTE LEFT (HCC): ICD-10-CM

## 2021-03-17 DIAGNOSIS — Z72.0 TOBACCO USE: ICD-10-CM

## 2021-03-17 DIAGNOSIS — L03.113 CELLULITIS OF RIGHT ARM: ICD-10-CM

## 2021-03-17 DIAGNOSIS — F19.90 IVDU (INTRAVENOUS DRUG USER): ICD-10-CM

## 2021-03-17 DIAGNOSIS — F31.9 BIPOLAR 1 DISORDER (HCC): ICD-10-CM

## 2021-03-17 DIAGNOSIS — B96.89 BV (BACTERIAL VAGINOSIS): ICD-10-CM

## 2021-03-17 DIAGNOSIS — L03.113 RIGHT FOREARM CELLULITIS: ICD-10-CM

## 2021-03-17 DIAGNOSIS — I82.A12 ACUTE DEEP VEIN THROMBOSIS (DVT) OF AXILLARY VEIN OF LEFT UPPER EXTREMITY (HCC): ICD-10-CM

## 2021-03-17 DIAGNOSIS — N76.0 BV (BACTERIAL VAGINOSIS): ICD-10-CM

## 2021-03-17 DIAGNOSIS — R77.8 ELEVATED TROPONIN: ICD-10-CM

## 2021-03-17 DIAGNOSIS — R11.0 NAUSEA: ICD-10-CM

## 2021-03-17 DIAGNOSIS — L03.221 CELLULITIS OF NECK: Primary | ICD-10-CM

## 2021-03-17 DIAGNOSIS — F19.10 INTRAVENOUS DRUG ABUSE (HCC): ICD-10-CM

## 2021-03-17 DIAGNOSIS — Z20.2 POSSIBLE EXPOSURE TO STD: ICD-10-CM

## 2021-03-17 PROBLEM — R79.89 ELEVATED TROPONIN: Status: ACTIVE | Noted: 2020-08-31

## 2021-03-17 PROBLEM — Z18.9 RETAINED FOREIGN BODY: Status: ACTIVE | Noted: 2021-03-17

## 2021-03-17 LAB
ALBUMIN SERPL BCP-MCNC: 3.8 G/DL (ref 3.5–5)
ALP SERPL-CCNC: 84 U/L (ref 46–116)
ALT SERPL W P-5'-P-CCNC: 38 U/L (ref 12–78)
AMPHETAMINES SERPL QL SCN: POSITIVE
ANION GAP SERPL CALCULATED.3IONS-SCNC: 5 MMOL/L (ref 4–13)
APTT PPP: 28 SECONDS (ref 23–37)
AST SERPL W P-5'-P-CCNC: 40 U/L (ref 5–45)
ATRIAL RATE: 60 BPM
BARBITURATES UR QL: NEGATIVE
BASOPHILS # BLD AUTO: 0.02 THOUSANDS/ΜL (ref 0–0.1)
BASOPHILS NFR BLD AUTO: 0 % (ref 0–1)
BENZODIAZ UR QL: POSITIVE
BILIRUB SERPL-MCNC: 0.31 MG/DL (ref 0.2–1)
BILIRUB UR QL STRIP: NEGATIVE
BILIRUB UR QL STRIP: NEGATIVE
BUN SERPL-MCNC: 9 MG/DL (ref 5–25)
CALCIUM SERPL-MCNC: 9.3 MG/DL (ref 8.3–10.1)
CHLORIDE SERPL-SCNC: 103 MMOL/L (ref 100–108)
CLARITY UR: CLEAR
CLARITY UR: CLEAR
CO2 SERPL-SCNC: 30 MMOL/L (ref 21–32)
COCAINE UR QL: NEGATIVE
COLOR UR: ABNORMAL
COLOR UR: YELLOW
CREAT SERPL-MCNC: 0.67 MG/DL (ref 0.6–1.3)
EOSINOPHIL # BLD AUTO: 0.21 THOUSAND/ΜL (ref 0–0.61)
EOSINOPHIL NFR BLD AUTO: 3 % (ref 0–6)
ERYTHROCYTE [DISTWIDTH] IN BLOOD BY AUTOMATED COUNT: 12.9 % (ref 11.6–15.1)
EXT PREG TEST URINE: NEGATIVE
EXT. CONTROL ED NAV: NORMAL
GFR SERPL CREATININE-BSD FRML MDRD: 120 ML/MIN/1.73SQ M
GLUCOSE SERPL-MCNC: 112 MG/DL (ref 65–140)
GLUCOSE UR STRIP-MCNC: NEGATIVE MG/DL
GLUCOSE UR STRIP-MCNC: NEGATIVE MG/DL
HCT VFR BLD AUTO: 37.4 % (ref 34.8–46.1)
HGB BLD-MCNC: 12.7 G/DL (ref 11.5–15.4)
HGB UR QL STRIP.AUTO: NEGATIVE
HGB UR QL STRIP.AUTO: NEGATIVE
IMM GRANULOCYTES # BLD AUTO: 0.05 THOUSAND/UL (ref 0–0.2)
IMM GRANULOCYTES NFR BLD AUTO: 1 % (ref 0–2)
INR PPP: 0.98 (ref 0.84–1.19)
KETONES UR STRIP-MCNC: NEGATIVE MG/DL
KETONES UR STRIP-MCNC: NEGATIVE MG/DL
LACTATE SERPL-SCNC: 1.2 MMOL/L (ref 0.5–2)
LEUKOCYTE ESTERASE UR QL STRIP: NEGATIVE
LEUKOCYTE ESTERASE UR QL STRIP: NEGATIVE
LYMPHOCYTES # BLD AUTO: 2.25 THOUSANDS/ΜL (ref 0.6–4.47)
LYMPHOCYTES NFR BLD AUTO: 31 % (ref 14–44)
MCH RBC QN AUTO: 28.9 PG (ref 26.8–34.3)
MCHC RBC AUTO-ENTMCNC: 34 G/DL (ref 31.4–37.4)
MCV RBC AUTO: 85 FL (ref 82–98)
METHADONE UR QL: NEGATIVE
MONOCYTES # BLD AUTO: 0.52 THOUSAND/ΜL (ref 0.17–1.22)
MONOCYTES NFR BLD AUTO: 7 % (ref 4–12)
NEUTROPHILS # BLD AUTO: 4.3 THOUSANDS/ΜL (ref 1.85–7.62)
NEUTS SEG NFR BLD AUTO: 58 % (ref 43–75)
NITRITE UR QL STRIP: NEGATIVE
NITRITE UR QL STRIP: NEGATIVE
NRBC BLD AUTO-RTO: 0 /100 WBCS
OPIATES UR QL SCN: NEGATIVE
OXYCODONE+OXYMORPHONE UR QL SCN: POSITIVE
P AXIS: 34 DEGREES
PCP UR QL: NEGATIVE
PH UR STRIP.AUTO: 8.5 [PH]
PH UR STRIP.AUTO: 8.5 [PH] (ref 4.5–8)
PLATELET # BLD AUTO: 273 THOUSANDS/UL (ref 149–390)
PMV BLD AUTO: 8.5 FL (ref 8.9–12.7)
POTASSIUM SERPL-SCNC: 3.7 MMOL/L (ref 3.5–5.3)
PR INTERVAL: 120 MS
PROCALCITONIN SERPL-MCNC: <0.05 NG/ML
PROT SERPL-MCNC: 7.5 G/DL (ref 6.4–8.2)
PROT UR STRIP-MCNC: NEGATIVE MG/DL
PROT UR STRIP-MCNC: NEGATIVE MG/DL
PROTHROMBIN TIME: 13.1 SECONDS (ref 11.6–14.5)
QRS AXIS: 70 DEGREES
QRSD INTERVAL: 78 MS
QT INTERVAL: 414 MS
QTC INTERVAL: 414 MS
RBC # BLD AUTO: 4.39 MILLION/UL (ref 3.81–5.12)
SODIUM SERPL-SCNC: 138 MMOL/L (ref 136–145)
SP GR UR STRIP.AUTO: 1.02 (ref 1–1.03)
SP GR UR STRIP.AUTO: 1.02 (ref 1–1.03)
T WAVE AXIS: 72 DEGREES
THC UR QL: NEGATIVE
TROPONIN I SERPL-MCNC: 1.19 NG/ML
TROPONIN I SERPL-MCNC: 1.46 NG/ML
TROPONIN I SERPL-MCNC: 2.22 NG/ML
UROBILINOGEN UR QL STRIP.AUTO: 0.2 E.U./DL
UROBILINOGEN UR QL STRIP.AUTO: 0.2 E.U./DL
VENTRICULAR RATE: 60 BPM
WBC # BLD AUTO: 7.35 THOUSAND/UL (ref 4.31–10.16)

## 2021-03-17 PROCEDURE — 99285 EMERGENCY DEPT VISIT HI MDM: CPT | Performed by: PHYSICIAN ASSISTANT

## 2021-03-17 PROCEDURE — 76882 US LMTD JT/FCL EVL NVASC XTR: CPT

## 2021-03-17 PROCEDURE — 81003 URINALYSIS AUTO W/O SCOPE: CPT

## 2021-03-17 PROCEDURE — 99223 1ST HOSP IP/OBS HIGH 75: CPT | Performed by: FAMILY MEDICINE

## 2021-03-17 PROCEDURE — 96375 TX/PRO/DX INJ NEW DRUG ADDON: CPT

## 2021-03-17 PROCEDURE — 99223 1ST HOSP IP/OBS HIGH 75: CPT | Performed by: INTERNAL MEDICINE

## 2021-03-17 PROCEDURE — 84145 PROCALCITONIN (PCT): CPT | Performed by: PHYSICIAN ASSISTANT

## 2021-03-17 PROCEDURE — 83605 ASSAY OF LACTIC ACID: CPT | Performed by: PHYSICIAN ASSISTANT

## 2021-03-17 PROCEDURE — 96365 THER/PROPH/DIAG IV INF INIT: CPT

## 2021-03-17 PROCEDURE — 85730 THROMBOPLASTIN TIME PARTIAL: CPT | Performed by: PHYSICIAN ASSISTANT

## 2021-03-17 PROCEDURE — 99222 1ST HOSP IP/OBS MODERATE 55: CPT | Performed by: INTERNAL MEDICINE

## 2021-03-17 PROCEDURE — 96367 TX/PROPH/DG ADDL SEQ IV INF: CPT

## 2021-03-17 PROCEDURE — 80053 COMPREHEN METABOLIC PANEL: CPT | Performed by: PHYSICIAN ASSISTANT

## 2021-03-17 PROCEDURE — 36415 COLL VENOUS BLD VENIPUNCTURE: CPT | Performed by: PHYSICIAN ASSISTANT

## 2021-03-17 PROCEDURE — 99285 EMERGENCY DEPT VISIT HI MDM: CPT

## 2021-03-17 PROCEDURE — 81003 URINALYSIS AUTO W/O SCOPE: CPT | Performed by: PHYSICIAN ASSISTANT

## 2021-03-17 PROCEDURE — 81025 URINE PREGNANCY TEST: CPT | Performed by: PHYSICIAN ASSISTANT

## 2021-03-17 PROCEDURE — 84484 ASSAY OF TROPONIN QUANT: CPT | Performed by: FAMILY MEDICINE

## 2021-03-17 PROCEDURE — 80307 DRUG TEST PRSMV CHEM ANLYZR: CPT | Performed by: PHYSICIAN ASSISTANT

## 2021-03-17 PROCEDURE — 85610 PROTHROMBIN TIME: CPT | Performed by: PHYSICIAN ASSISTANT

## 2021-03-17 PROCEDURE — 93306 TTE W/DOPPLER COMPLETE: CPT

## 2021-03-17 PROCEDURE — 93306 TTE W/DOPPLER COMPLETE: CPT | Performed by: INTERNAL MEDICINE

## 2021-03-17 PROCEDURE — 99252 IP/OBS CONSLTJ NEW/EST SF 35: CPT | Performed by: PHYSICIAN ASSISTANT

## 2021-03-17 PROCEDURE — 84484 ASSAY OF TROPONIN QUANT: CPT | Performed by: PHYSICIAN ASSISTANT

## 2021-03-17 PROCEDURE — 85025 COMPLETE CBC W/AUTO DIFF WBC: CPT | Performed by: PHYSICIAN ASSISTANT

## 2021-03-17 PROCEDURE — 93010 ELECTROCARDIOGRAM REPORT: CPT | Performed by: INTERNAL MEDICINE

## 2021-03-17 PROCEDURE — 87040 BLOOD CULTURE FOR BACTERIA: CPT | Performed by: PHYSICIAN ASSISTANT

## 2021-03-17 PROCEDURE — 93005 ELECTROCARDIOGRAM TRACING: CPT

## 2021-03-17 RX ORDER — CLONAZEPAM 0.5 MG/1
1 TABLET ORAL ONCE
Status: COMPLETED | OUTPATIENT
Start: 2021-03-17 | End: 2021-03-17

## 2021-03-17 RX ORDER — LORAZEPAM 2 MG/ML
1 INJECTION INTRAMUSCULAR ONCE
Status: COMPLETED | OUTPATIENT
Start: 2021-03-17 | End: 2021-03-17

## 2021-03-17 RX ORDER — SODIUM CHLORIDE 9 MG/ML
3 INJECTION INTRAVENOUS
Status: DISCONTINUED | OUTPATIENT
Start: 2021-03-17 | End: 2021-03-21 | Stop reason: HOSPADM

## 2021-03-17 RX ORDER — NICOTINE 21 MG/24HR
1 PATCH, TRANSDERMAL 24 HOURS TRANSDERMAL DAILY
Status: DISCONTINUED | OUTPATIENT
Start: 2021-03-18 | End: 2021-03-21 | Stop reason: HOSPADM

## 2021-03-17 RX ORDER — LIDOCAINE 40 MG/G
CREAM TOPICAL ONCE
Status: COMPLETED | OUTPATIENT
Start: 2021-03-17 | End: 2021-03-17

## 2021-03-17 RX ORDER — CLONAZEPAM 0.5 MG/1
0.5 TABLET ORAL 2 TIMES DAILY
Status: ON HOLD | COMMUNITY
End: 2021-03-21 | Stop reason: SDUPTHER

## 2021-03-17 RX ORDER — LORAZEPAM 1 MG/1
1 TABLET ORAL EVERY 6 HOURS PRN
Status: DISCONTINUED | OUTPATIENT
Start: 2021-03-17 | End: 2021-03-18

## 2021-03-17 RX ORDER — QUETIAPINE FUMARATE 25 MG/1
25 TABLET, FILM COATED ORAL
Status: DISCONTINUED | OUTPATIENT
Start: 2021-03-17 | End: 2021-03-18

## 2021-03-17 RX ORDER — NICOTINE 21 MG/24HR
1 PATCH, TRANSDERMAL 24 HOURS TRANSDERMAL DAILY
Status: DISCONTINUED | OUTPATIENT
Start: 2021-03-17 | End: 2021-03-17

## 2021-03-17 RX ORDER — VANCOMYCIN HYDROCHLORIDE 1 G/200ML
15 INJECTION, SOLUTION INTRAVENOUS ONCE
Status: COMPLETED | OUTPATIENT
Start: 2021-03-17 | End: 2021-03-17

## 2021-03-17 RX ORDER — NICOTINE 21 MG/24HR
14 PATCH, TRANSDERMAL 24 HOURS TRANSDERMAL ONCE
Status: COMPLETED | OUTPATIENT
Start: 2021-03-17 | End: 2021-03-18

## 2021-03-17 RX ORDER — ASPIRIN 81 MG/1
81 TABLET ORAL DAILY
Status: DISCONTINUED | OUTPATIENT
Start: 2021-03-17 | End: 2021-03-21 | Stop reason: HOSPADM

## 2021-03-17 RX ORDER — ONDANSETRON 2 MG/ML
4 INJECTION INTRAMUSCULAR; INTRAVENOUS ONCE
Status: COMPLETED | OUTPATIENT
Start: 2021-03-17 | End: 2021-03-17

## 2021-03-17 RX ORDER — SODIUM CHLORIDE 9 MG/ML
125 INJECTION, SOLUTION INTRAVENOUS CONTINUOUS
Status: DISCONTINUED | OUTPATIENT
Start: 2021-03-17 | End: 2021-03-20

## 2021-03-17 RX ORDER — ARIPIPRAZOLE 10 MG/1
10 TABLET ORAL DAILY
COMMUNITY

## 2021-03-17 RX ORDER — METOPROLOL TARTRATE 5 MG/5ML
5 INJECTION INTRAVENOUS EVERY 6 HOURS PRN
Status: DISCONTINUED | OUTPATIENT
Start: 2021-03-17 | End: 2021-03-21 | Stop reason: HOSPADM

## 2021-03-17 RX ORDER — TRAZODONE HYDROCHLORIDE 50 MG/1
50 TABLET ORAL
Status: DISCONTINUED | OUTPATIENT
Start: 2021-03-17 | End: 2021-03-17

## 2021-03-17 RX ORDER — LORAZEPAM 2 MG/ML
0.5 INJECTION INTRAMUSCULAR EVERY 4 HOURS PRN
Status: DISCONTINUED | OUTPATIENT
Start: 2021-03-17 | End: 2021-03-21 | Stop reason: HOSPADM

## 2021-03-17 RX ORDER — CLONIDINE HYDROCHLORIDE 0.1 MG/1
0.2 TABLET ORAL EVERY 12 HOURS SCHEDULED
Status: DISCONTINUED | OUTPATIENT
Start: 2021-03-17 | End: 2021-03-21 | Stop reason: HOSPADM

## 2021-03-17 RX ADMIN — ENOXAPARIN SODIUM 70 MG: 80 INJECTION SUBCUTANEOUS at 13:19

## 2021-03-17 RX ADMIN — LORAZEPAM 1 MG: 1 TABLET ORAL at 13:19

## 2021-03-17 RX ADMIN — RALTEGRAVIR 400 MG: 400 TABLET, FILM COATED ORAL at 09:53

## 2021-03-17 RX ADMIN — LIDOCAINE 1 APPLICATION: 4 CREAM TOPICAL at 09:06

## 2021-03-17 RX ADMIN — ASPIRIN 81 MG: 81 TABLET, COATED ORAL at 13:21

## 2021-03-17 RX ADMIN — SODIUM CHLORIDE 125 ML/HR: 0.9 INJECTION, SOLUTION INTRAVENOUS at 20:37

## 2021-03-17 RX ADMIN — EMTRICITABINE: 200 CAPSULE ORAL at 09:53

## 2021-03-17 RX ADMIN — Medication 14 MG: at 09:06

## 2021-03-17 RX ADMIN — LORAZEPAM 1 MG: 1 TABLET ORAL at 20:38

## 2021-03-17 RX ADMIN — VANCOMYCIN HYDROCHLORIDE 1250 MG: 5 INJECTION, POWDER, LYOPHILIZED, FOR SOLUTION INTRAVENOUS at 22:25

## 2021-03-17 RX ADMIN — VANCOMYCIN HYDROCHLORIDE 1000 MG: 1 INJECTION, SOLUTION INTRAVENOUS at 09:54

## 2021-03-17 RX ADMIN — LORAZEPAM 0.5 MG: 2 INJECTION INTRAMUSCULAR; INTRAVENOUS at 22:27

## 2021-03-17 RX ADMIN — LORAZEPAM 1 MG: 2 INJECTION INTRAMUSCULAR; INTRAVENOUS at 11:23

## 2021-03-17 RX ADMIN — CLONAZEPAM 1 MG: 0.5 TABLET ORAL at 09:47

## 2021-03-17 RX ADMIN — CLONIDINE HYDROCHLORIDE 0.2 MG: 0.1 TABLET ORAL at 13:19

## 2021-03-17 RX ADMIN — ENOXAPARIN SODIUM 70 MG: 80 INJECTION SUBCUTANEOUS at 20:37

## 2021-03-17 RX ADMIN — ONDANSETRON 4 MG: 2 INJECTION INTRAMUSCULAR; INTRAVENOUS at 09:54

## 2021-03-17 RX ADMIN — CEFEPIME 2000 MG: 2 INJECTION, POWDER, FOR SOLUTION INTRAVENOUS at 09:28

## 2021-03-17 RX ADMIN — LORAZEPAM 0.5 MG: 2 INJECTION INTRAMUSCULAR; INTRAVENOUS at 17:48

## 2021-03-17 NOTE — ASSESSMENT & PLAN NOTE
· With associated fluctuance and IV drug use, no abscess noted   Blood cultures negative at 24 hours, 4 days from 3/13  · Continue Vancomycin, likely transition to PO antibiotics in the next coming day  · Appreciate ID and Pharmacy input   · Trend cultures  · Follow fever curve, CBC

## 2021-03-17 NOTE — ASSESSMENT & PLAN NOTE
· Heroin / Meth / Cocaine noted in urine  Patient continues to feel ill   · Continue Clonidine   2mg BID  · Seroquel scheduled at bedtime   · Restart Klonopin 0 5 mg PO BID - patient takes this scheduled as outpatient   · Ativan 0 5mg IV Q4 PRN

## 2021-03-17 NOTE — ASSESSMENT & PLAN NOTE
· Retained needle in left neck, early phlegmon noted on CT scan  · Surgical consult noted, no removal planned at this time   · ? Phlegmon   · Need serial close exams  · Monitor for any signs of hot potato voice or airway compromise

## 2021-03-17 NOTE — ASSESSMENT & PLAN NOTE
· Likely non MI troponin elevation but she has a history of TV repair due to endocarditis  · Stop telemetry   · Start baby aspirin  · Consult Cardiology appreciated, no further management from their end

## 2021-03-17 NOTE — CONSULTS
Consultation - Cardiology   Hiral Rankin 31 y o  female MRN: [id number]  Unit/Bed#: S -01 Encounter: [id number]    Assessment/Plan     Principal Problem:    Right forearm cellulitis  Active Problems:    Intravenous drug abuse ([address] )    DVT of axillary vein, acute left (HCC)    Elevated troponin    Exposure to STD    Retained foreign body      Assessment:  1  Elevated troponin  Suspect non MI troponin elevation  No cardiac symptoms   EKG- NSR no ischemia  2  Right forearm cellulitis  On IV vanco  3  Retained foreign body neck- needle fragment  Surgical consultation pending  4   Acute axillary vein DVT- noncompliant with Eliquis, currently on therapeutic lovenox  5  IVDA-  Heroin/ meth/cocaine  6  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     Plan:  1  Trend troponins  2  F/U with TTE  3  F/U with blood cultures  History of Present Illness   Physician Requesting Consult: Mala Baron MD  Reason for Consult / Principal Problem: elevated troponin    HPI: Hiral Rankin is a 31y o  year old female with 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 who underwent tricuspid  Leaflet resection and repair ( #30mm Medtronic 3D contour ring)  9/11/2020 and completed 6 weeks antibiotics,  Normal biventricular systolic function, IV drug use, bipolar disorder, hepatitis-C, opioid dependence  She presented to the ED with right forearm swelling and concern for STD  Cardiology consult for elevated  Troponin 2 22  Patient any complaints chest pain  She has recent shortness of breath and nausea which she believes is from withdrawing from meth  She has recently  been quit active without any CP, SOB  No recent fever, chills  She has been fatigue since COVID in November  Lactic acid normal  No  Leukocytosis  UDS   Positive amph/met, benzo  HR 70's, normotensive  No hypoxia      She presented to the hospital [id number] with left-sided neck pain setting of retained needle fragment from broken IV needle  Imaging reveals soft tissue infiltration possibly early phlegmonous changes  Overlying right medial   Was a 1 2 cm needle fragment superior to the clavicle with surrounding area of subcu infiltration/ early phlegmonous changes  Extended to the level of medial clavicle  She signed out AMA  She again presented to hospital 3/13 with left sided neck pain and signed out 3/16  Surgery  Consulted and recommended patient continued pain or develops signs abscess formation require operative drainage  She presents today with RFA swelling and redness and concern for STD  Inpatient consult to Cardiology  Consult performed by: BOO Ramos  Consult ordered by: Jared Trujillo MD          Review of Systems   Constitutional: Positive for fatigue  HENT: Negative  Eyes: Positive for visual disturbance  Respiratory: Negative  Cardiovascular: Positive for palpitations  Gastrointestinal: Positive for nausea  Endocrine: Negative  Genitourinary: Negative  Musculoskeletal: Negative  Skin: Negative  Allergic/Immunologic: Negative  Neurological: Negative  Hematological: Negative  Psychiatric/Behavioral: Negative          Historical Information   Past Medical History:   Diagnosis Date    Abnormal Pap smear of cervix     Anxiety     Depression     Endocarditis     2018    Hepatitis C     HPV (human papilloma virus) anogenital infection      Past Surgical History:   Procedure Laterality Date    IR PICC LINE PLACEMENT DOUBLE LUMEN  8/26/2020    KNEE SURGERY Left     MOUTH SURGERY      WI REPLACE TRICUSPID W CP BYPASS N/A 9/10/2020    Procedure: REPAIR VALVE TRICUSPID with Medtronic 30mm 3D Contour  Annuloplasty Ring;  Surgeon: Phil Hunt MD;  Location: [facility];  Service: Cardiac Surgery    TOOTH EXTRACTION N/A 9/7/2020    Procedure: EXTRACTION TOOTH 32;  Surgeon: Maria Eugenia Swann DMD;  Location: [facility];  Service: Maxillofacial     Social History     Substance and Sexual Activity   Alcohol Use Yes    Alcohol/week: 0 0 standard drinks    Frequency: Monthly or less    Binge frequency: Never     Social History     Substance and Sexual Activity   Drug Use Yes    Types: Heroin, Marijuana, Benzodiazepines, Cocaine     Social History     Tobacco Use   Smoking Status Current Every Day Smoker    Packs/day: 0 50    Types: Cigarettes    Last attempt to quit: 2016    Years since quittin 3   Smokeless Tobacco Never Used     Family History: History reviewed  No pertinent family history  Meds/Allergies   current meds:   Current Facility-Administered Medications   Medication Dose Route Frequency    aspirin (ECOTRIN LOW STRENGTH) EC tablet 81 mg  81 mg Oral Daily    cloNIDine (CATAPRES) tablet 0 2 mg  0 2 mg Oral Q12H [address]    emtricitabine-tenofovir (TRUVADA 200-300) combo dose   Oral Daily    enoxaparin (LOVENOX) subcutaneous injection 70 mg  1 mg/kg Subcutaneous Q12H JEFF    LORazepam (ATIVAN) tablet 1 mg  1 mg Oral Q6H PRN    [START ON 3/18/2021] nicotine (NICODERM CQ) 14 mg/24hr TD 24 hr patch 1 patch  1 patch Transdermal Daily    nicotine (NICODERM CQ) 14 mg/24hr TD 24 hr patch 14 mg  14 mg Transdermal Once    sodium chloride (PF) 0 9 % injection 3 mL  3 mL Intravenous Q1H PRN    traZODone (DESYREL) tablet 50 mg  50 mg Oral HS    vancomycin (VANCOCIN) 1,250 mg in sodium chloride 0 9 % 250 mL IVPB  20 mg/kg Intravenous Q12H    and PTA meds:    No medications prior to admission  Allergies   Allergen Reactions    Cat Hair Extract     Dog Epithelium     Latex     Pollen Extract        Objective   Vitals: Blood pressure 117/81, pulse 77, temperature 98 °F (36 7 °C), temperature source Oral, resp  rate 20, weight 65 kg (143 lb 4 8 oz), SpO2 97 %, not currently breastfeeding    Orthostatic Blood Pressures      Most Recent Value   Blood Pressure  117/81 filed at 2021 1219   Patient Position - Orthostatic VS  Lying filed at 03/17/2021 1219            Intake/Output Summary (Last 24 hours) at 3/17/2021 1319  Last data filed at 3/17/2021 1100  Gross per 24 hour   Intake 250 ml   Output --   Net 250 ml       Invasive Devices     Peripheral Intravenous Line            Peripheral IV 03/17/21 Right Arm less than 1 day                Physical Exam: /81 (BP Location: Left arm)   Pulse 77   Temp 98 °F (36 7 °C) (Oral)   Resp 20   Wt 65 kg (143 lb 4 8 oz)   SpO2 97%   BMI 23 85 kg/m²      General Appearance:    Alert, cooperative, no distress, appears stated age   Head:    Normocephalic, no scleral icterus   Eyes:    PERRL   Nose:   Nares normal, septum midline, mucosa normal, no drainage    Throat:   Lips, mucosa, and tongue normal   Neck:   No JVD       Lungs:     Clear to auscultation bilaterally, respirations unlabored        Heart:    Regular rate and rhythm, S1 and S2 normal, no murmur, rub   or gallop       Extremities:   Extremities normal, atraumatic, no cyanosis or edema   Pulses:   2+ and symmetric all extremities   Skin:   Skin color, texture, turgor normal, no rashes or lesions   Neurologic:   Alert and oriented to person place and time   No focal deficits       Lab Results:   Recent Results (from the past 72 hour(s))   CBC and differential    Collection Time: 03/15/21  6:36 AM   Result Value Ref Range    WBC 4 80 4 31 - 10 16 Thousand/uL    RBC 3 88 3 81 - 5 12 Million/uL    Hemoglobin 11 2 (L) 11 5 - 15 4 g/dL    Hematocrit 33 8 (L) 34 8 - 46 1 %    MCV 87 82 - 98 fL    MCH 28 9 26 8 - 34 3 pg    MCHC 33 1 31 4 - 37 4 g/dL    RDW 13 1 11 6 - 15 1 %    MPV 9 1 8 9 - 12 7 fL    Platelets 301 499 - 205 Thousands/uL    nRBC 0 /100 WBCs    Neutrophils Relative 39 (L) 43 - 75 %    Immat GRANS % 0 0 - 2 %    Lymphocytes Relative 45 (H) 14 - 44 %    Monocytes Relative 8 4 - 12 %    Eosinophils Relative 8 (H) 0 - 6 %    Basophils Relative 0 0 - 1 %    Neutrophils Absolute 1 85 1 85 - 7 62 Thousands/µL    Immature Grans Absolute 0 01 0 00 - 0 20 Thousand/uL    Lymphocytes Absolute 2 17 0 60 - 4 47 Thousands/µL    Monocytes Absolute 0 36 0 17 - 1 22 Thousand/µL    Eosinophils Absolute 0 39 0 00 - 0 61 Thousand/µL    Basophils Absolute 0 02 0 00 - 0 10 Thousands/µL   Basic metabolic panel    Collection Time: 03/15/21  6:36 AM   Result Value Ref Range    Sodium 141 136 - 145 mmol/L    Potassium 4 2 3 5 - 5 3 mmol/L    Chloride 109 (H) 100 - 108 mmol/L    CO2 28 21 - 32 mmol/L    ANION GAP 4 4 - 13 mmol/L    BUN 8 5 - 25 mg/dL    Creatinine 0 57 (L) 0 60 - 1 30 mg/dL    Glucose 117 65 - 140 mg/dL    Calcium 8 4 8 3 - 10 1 mg/dL    eGFR 127 ml/min/1 73sq m   Vancomycin, trough Draw level peripherally  Give dose immediately after trough (do NOT hold dose)  Call Pharmacy with any questions/concerns (Pharm Consult)      Collection Time: 03/15/21  3:39 PM   Result Value Ref Range    Vancomycin Tr 23 4 (HH) 10 0 - 20 0 ug/mL   CBC and differential    Collection Time: 03/16/21  6:11 AM   Result Value Ref Range    WBC 4 77 4 31 - 10 16 Thousand/uL    RBC 3 96 3 81 - 5 12 Million/uL    Hemoglobin 11 3 (L) 11 5 - 15 4 g/dL    Hematocrit 34 4 (L) 34 8 - 46 1 %    MCV 87 82 - 98 fL    MCH 28 5 26 8 - 34 3 pg    MCHC 32 8 31 4 - 37 4 g/dL    RDW 12 9 11 6 - 15 1 %    MPV 9 1 8 9 - 12 7 fL    Platelets 156 996 - 401 Thousands/uL    nRBC 0 /100 WBCs    Neutrophils Relative 29 (L) 43 - 75 %    Immat GRANS % 1 0 - 2 %    Lymphocytes Relative 55 (H) 14 - 44 %    Monocytes Relative 6 4 - 12 %    Eosinophils Relative 9 (H) 0 - 6 %    Basophils Relative 0 0 - 1 %    Neutrophils Absolute 1 40 (L) 1 85 - 7 62 Thousands/µL    Immature Grans Absolute 0 03 0 00 - 0 20 Thousand/uL    Lymphocytes Absolute 2 58 0 60 - 4 47 Thousands/µL    Monocytes Absolute 0 29 0 17 - 1 22 Thousand/µL    Eosinophils Absolute 0 45 0 00 - 0 61 Thousand/µL    Basophils Absolute 0 02 0 00 - 0 10 Thousands/µL   Basic metabolic panel    Collection Time: 03/16/21  6:11 AM   Result Value Ref Range    Sodium 141 136 - 145 mmol/L    Potassium 3 9 3 5 - 5 3 mmol/L    Chloride 108 100 - 108 mmol/L    CO2 29 21 - 32 mmol/L    ANION GAP 4 4 - 13 mmol/L    BUN 7 5 - 25 mg/dL    Creatinine 0 57 (L) 0 60 - 1 30 mg/dL    Glucose 122 65 - 140 mg/dL    Calcium 8 7 8 3 - 10 1 mg/dL    eGFR 127 ml/min/1 73sq m   Urine Macroscopic, POC    Collection Time: 03/17/21  8:41 AM   Result Value Ref Range    Color, UA Yellow     Clarity, UA Clear     pH, UA 8 5 (H) 4 5 - 8 0    Leukocytes, UA Negative Negative    Nitrite, UA Negative Negative    Protein, UA Negative Negative mg/dl    Glucose, UA Negative Negative mg/dl    Ketones, UA Negative Negative mg/dl    Urobilinogen, UA 0 2 0 2, 1 0 E U /dl E U /dl    Bilirubin, UA Negative Negative    Blood, UA Negative Negative    Specific Gravity, UA 1 020 1 003 - 1 030   POCT pregnancy, urine    Collection Time: 03/17/21  8:43 AM   Result Value Ref Range    EXT PREG TEST UR (Ref: Negative) Negative     Control Valid    UA w Reflex to Microscopic w Reflex to Culture    Collection Time: 03/17/21  8:46 AM    Specimen: Urine, Other   Result Value Ref Range    Color, UA Light Yellow     Clarity, UA Clear     Specific Gravity, UA 1 020 1 003 - 1 030    pH, UA 8 5 (A) 4 5, 5 0, 5 5, 6 0, 6 5, 7 0, 7 5, 8 0    Leukocytes, UA Negative Negative    Nitrite, UA Negative Negative    Protein, UA Negative Negative mg/dl    Glucose, UA Negative Negative mg/dl    Ketones, UA Negative Negative mg/dl    Urobilinogen, UA 0 2 0 2, 1 0 E U /dl E U /dl    Bilirubin, UA Negative Negative    Blood, UA Negative Negative   Rapid drug screen, urine    Collection Time: 03/17/21  8:46 AM   Result Value Ref Range    Amph/Meth UR Positive (A) Negative    Barbiturate Ur Negative Negative    Benzodiazepine Urine Positive (A) Negative    Cocaine Urine Negative Negative    Methadone Urine Negative Negative    Opiate Urine Negative Negative    PCP Ur Negative Negative    THC Urine Negative Negative    Oxycodone Urine Positive (A) Negative   Blood culture #1    Collection Time: 03/17/21  9:20 AM    Specimen: Hand, Right; Blood   Result Value Ref Range    Blood Culture Received in Microbiology Lab  Culture in Progress      CBC and differential    Collection Time: 03/17/21  9:28 AM   Result Value Ref Range    WBC 7 35 4 31 - 10 16 Thousand/uL    RBC 4 39 3 81 - 5 12 Million/uL    Hemoglobin 12 7 11 5 - 15 4 g/dL    Hematocrit 37 4 34 8 - 46 1 %    MCV 85 82 - 98 fL    MCH 28 9 26 8 - 34 3 pg    MCHC 34 0 31 4 - 37 4 g/dL    RDW 12 9 11 6 - 15 1 %    MPV 8 5 (L) 8 9 - 12 7 fL    Platelets 366 896 - 351 Thousands/uL    nRBC 0 /100 WBCs    Neutrophils Relative 58 43 - 75 %    Immat GRANS % 1 0 - 2 %    Lymphocytes Relative 31 14 - 44 %    Monocytes Relative 7 4 - 12 %    Eosinophils Relative 3 0 - 6 %    Basophils Relative 0 0 - 1 %    Neutrophils Absolute 4 30 1 85 - 7 62 Thousands/µL    Immature Grans Absolute 0 05 0 00 - 0 20 Thousand/uL    Lymphocytes Absolute 2 25 0 60 - 4 47 Thousands/µL    Monocytes Absolute 0 52 0 17 - 1 22 Thousand/µL    Eosinophils Absolute 0 21 0 00 - 0 61 Thousand/µL    Basophils Absolute 0 02 0 00 - 0 10 Thousands/µL   Comprehensive metabolic panel    Collection Time: 03/17/21  9:28 AM   Result Value Ref Range    Sodium 138 136 - 145 mmol/L    Potassium 3 7 3 5 - 5 3 mmol/L    Chloride 103 100 - 108 mmol/L    CO2 30 21 - 32 mmol/L    ANION GAP 5 4 - 13 mmol/L    BUN 9 5 - 25 mg/dL    Creatinine 0 67 0 60 - 1 30 mg/dL    Glucose 112 65 - 140 mg/dL    Calcium 9 3 8 3 - 10 1 mg/dL    AST 40 5 - 45 U/L    ALT 38 12 - 78 U/L    Alkaline Phosphatase 84 46 - 116 U/L    Total Protein 7 5 6 4 - 8 2 g/dL    Albumin 3 8 3 5 - 5 0 g/dL    Total Bilirubin 0 31 0 20 - 1 00 mg/dL    eGFR 120 ml/min/1 73sq m   Lactic Acid    Collection Time: 03/17/21  9:28 AM   Result Value Ref Range    LACTIC ACID 1 2 0 5 - 2 0 mmol/L   Protime-INR    Collection Time: 03/17/21  9:28 AM   Result Value Ref Range    Protime 13 1 11 6 - 14 5 seconds    INR 0 98 0 84 - 1 19   APTT    Collection Time: 03/17/21  9:28 AM   Result Value Ref Range    PTT 28 23 - 37 seconds   Blood culture #2    Collection Time: 03/17/21  9:28 AM    Specimen: Arm, Right; Blood   Result Value Ref Range    Blood Culture Received in Microbiology Lab  Culture in Progress  Troponin I    Collection Time: 03/17/21  9:28 AM   Result Value Ref Range    Troponin I 2 22 (H) <=0 04 ng/mL     Imaging: I have personally reviewed pertinent reports  Tele- NSR  VTE Prophylaxis: Enoxaparin (Lovenox)    Code Status: Level 1 - Full Code  Advance Directive and Living Will:      Power of :    POLST:      Counseling / Coordination of Care  Total floor / unit time spent today 45 minutes  Greater than 50% of total time was spent with the patient and / or family counseling and / or coordination of care

## 2021-03-17 NOTE — CONSULTS
Consultation - Infectious Disease   Steve Fajardo 31 y o  female MRN: [id number]  Unit/Bed#: S -01 Encounter: [id number]      IMPRESSION & RECOMMENDATIONS:     1  Right forearm and right hand cellulitis  S/p IV injection of methamphetamine last night at both sites  Rule out abscess and/or retained foreign body  Fortunately patient is systemically well and nontoxic appearing  Continue IV vancomycin  Pharmacy on consult for vancomycin trough management dosing  Discontinue cefepime  Follow-up arm ultrasound  Monitor temperature/WBC  Follow-up on pending blood cultures  Follow-up surgical consult    2   Left clavicular cellulitis, with associated phlegmon, secondary to IV drug injection   On antibiotic, cellulitic changes improved   No evidence of abscess clinically or radiologically   Patient is clinically improved on IV antibiotics   She remains systemically well and hemodynamically stable   Likely pathogens are skin pathogens  3/10, 3/11, 3/13 Blood cultures x 7 all no growth thus far  Today's blood cultures pending  Continue IV vancomycin  Serial neck exams  Monitor temperature/WBC  Follow-up on pending blood cultures  Follow-up surgical consult     3  Left clavicular foreign body/retained needle fragment from IVDU     Continue antibiotic plan as in above  Follow up Surgery consult      4  History of MSSA bacteremia and TV endocarditis, 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 with a stranger she met on Facebook who came to pick her up from the hospital who she injected IV methamphetamine with and engaged in unprotected  Continue HIV post exposure prophylaxis regimen with Truvada and Raltegravir  Check urine gonorrhea and chlamydia  Check RPR     Detailed review of medical records including all notes, imaging and labs  Above impression and plan discussed in detail with patient, RN, and primary care team   Thank you for this consultation  We will follow along with you      Antibiotics:  Vancomycin/Cefepime     HISTORY OF PRESENT ILLNESS:  Reason for Consult: 1  Cellulitis of neck  2  Cellulitis of right arm  3  Possible exposure to STD     HPI: Enedina Bar is a 31 y o  female, active IVDU, had a prolonged hospitalization August 2020 with MSSA bacteremia secondary to IVDU and TV endocarditis with right septic sacroiliitis  Patient underwent TV repair with annuloplasty 9/2020  She completed 6 weeks of IV antibiotic as an outpatient  After discharge, she was placed on p o  Keflex  However, she was noncompliant with treatment and follow-up  She was then admitted to [facility] 3/10/21 for left neck pain and found to have a retained needle fragment and associated cellulitis  She was on Vancomycin/Cefepime IV in-house and left AMA 3/12/21, readmitted to [facility] 3/13/21 for persistent pain and then again left AMA on 3/16/21 with a stranger she met on Facebook who came to pick her up from the hospital who she injected IV methamphetamine with and engaged in unprotected  She now [facility] today with right forearm swelling and redness and concern for STD exposure  Blood cultures were obtained and patient is admitted on IV Vancomycin/Cefepime  She was also given HIV post exposure prophylaxis in ER  Infectious Diease consultation is now being requested regarding evaluation and management of cellulitis of the neck, cellulitis of the right arm and possible STD exposure    Patient denies any fever or shaking chills  She is feeling very "amped up" and is requesting sedation  Has not been able to eat drink since her injection last night  She is feeling very anxious with chest discomfort and feels as though she is seeing double at times  She is ambulatory in room  REVIEW OF SYSTEMS:  As above in HPI, as well as,  A complete system-based review of systems is otherwise negative  PAST MEDICAL HISTORY:  Past Medical History:   Diagnosis Date    Abnormal Pap smear of cervix     Anxiety     Depression     Endocarditis     2018    Hepatitis C     HPV (human papilloma virus) anogenital infection      Past Surgical History:   Procedure Laterality Date    IR PICC LINE PLACEMENT DOUBLE LUMEN  2020    KNEE SURGERY Left     MOUTH SURGERY      PA REPLACE TRICUSPID W CP BYPASS N/A 9/10/2020    Procedure: REPAIR VALVE TRICUSPID with Medtronic 30mm 3D Contour  Annuloplasty Ring;  Surgeon: Justin Ramos MD;  Location: [facility];  Service: Cardiac Surgery    TOOTH EXTRACTION N/A 2020    Procedure: EXTRACTION TOOTH 28;  Surgeon: Sai Ortiz DMD;  Location: [facility];  Service: Maxillofacial       FAMILY HISTORY:  Non-contributory    SOCIAL HISTORY:  Social History     Substance and Sexual Activity   Alcohol Use Not Currently    Alcohol/week: 0 0 standard drinks    Frequency: Monthly or less    Binge frequency: Never     Social History     Substance and Sexual Activity   Drug Use Yes    Types: Heroin, Marijuana, Benzodiazepines, Cocaine, Methamphetamines     Social History     Tobacco Use   Smoking Status Current Every Day Smoker    Packs/day: 0 50    Types: Cigarettes    Last attempt to quit: 2016    Years since quittin 3   Smokeless Tobacco Never Used       ALLERGIES:  Allergies   Allergen Reactions    Cat Hair Extract     Dog Epithelium     Latex     Pollen Extract        MEDICATIONS:  All current active medications have been reviewed      PHYSICAL EXAM:  Vitals:  Temp:  [98 °F (36 7 °C)-98 1 °F (36 7 °C)] 98 °F (36 7 °C)  HR:  [60-77] 77  Resp:  [16-20] 20  BP: (117-124)/(74-81) 117/81  SpO2:  [97 %-98 %] 97 %  Temp (24hrs), Av 1 °F (36 7 °C), Min:98 °F (36 7 °C), Max:98 1 °F (36 7 °C)  Current: Temperature: 98 °F (36 7 °C)     Physical Exam:  General:  62-year-old female, anxious, talkative, resting in bed and ambulatory to bathroom on own, in no acute respiratory distress  Eyes:  Conjunctive clear with no hemorrhages or effusions, EOMI, PERRL  Neck:  Supple, Left-sided soft, erythematous fluctuance at retained needle site without expressible drainage and mildly tender  Oropharynx:  No ulcers, no lesions  Lungs:  Clear to auscultation bilaterally, no accessory muscle use  Cardiac:  Regular rate and rhythm, no murmurs, sternotomy incision healed  Abdomen:  Soft, non-tender, non-distended  Extremities:  No peripheral cyanosis, clubbing, right forearm warm erythematous, tender swelling  Right dorsal hand erythematous tender swelling  Skin:  No rashes, no ulcers, + multiple excoriations and scabs on extremities  Neurological:  Moves all four extremities spontaneously, sensation grossly intact    LABS, IMAGING, & OTHER STUDIES:  Lab Results:  I have personally reviewed pertinent labs    Results from last 7 days   Lab Units 21  0928 21  0611 03/15/21  0636 21  0640 21  2045   POTASSIUM mmol/L 3 7 3 9 4 2 3 4* 3 4*   CHLORIDE mmol/L 103 108 109* 112* 110*   CO2 mmol/L 30 29 28 28 28   BUN mg/dL 9 7 8 8 9   CREATININE mg/dL 0 67 0 57* 0 57* 0 60 0 55*   EGFR ml/min/1 73sq m 120 127 127 124 128   CALCIUM mg/dL 9 3 8 7 8 4 8 4 8 6   AST U/L 40  --   --  30 38   ALT U/L 38  --   --  38 43   ALK PHOS U/L 84  --   --  95 91     Results from last 7 days   Lab Units 21  0928 21  0611 03/15/21  0636   WBC Thousand/uL 7 35 4 77 4 80   HEMOGLOBIN g/dL 12 7 11 3* 11 2*   PLATELETS Thousands/uL 273 253 236     Results from last 7 days Lab Units 03/17/21  6288 03/17/21  0920 03/13/21 2052 03/13/21 2046 03/11/21  0913 03/11/21  0912 03/10/21  2349   BLOOD CULTURE  Received in Microbiology Lab  Culture in Progress  Received in Microbiology Lab  Culture in Progress  No Growth at 72 hrs  No Growth at 72 hrs  No Growth After 5 Days  No Growth After 5 Days  No Growth After 5 Days  No Growth After 5 Days  Results from last 7 days   Lab Units 03/17/21  0928 03/14/21  0640 03/13/21 2045 03/11/21  0912 03/10/21  2122   PROCALCITONIN ng/ml <0 05 <0 05 <0 05 <0 05 0 06       Imaging Studies:   03/10/2021 CT neck:  1 2 cm needle fragment superior to the mid clavicle with surrounding phlegmonous changes with small air droplets within  EKG, Pathology, and Other Studies:   I have personally reviewed pertinent reports

## 2021-03-17 NOTE — ED NOTES
Pt requesting medication for "doing meth last night, im still strung out"     Kesha Valdivia, RN  03/17/21 [id number]

## 2021-03-17 NOTE — ED PROVIDER NOTES
History  Chief Complaint   Patient presents with    Foreign Body in Skin     pt recently inpatient at [facility] for "part of a needle that is stuck in skin" near clavicle  signed out [address] [facility] [address] Jade Christie is a 31 y o  female with a PMHx of Endocarditis (s/p Tricuspid valve replacement), Hepatitis C and IVDU who presents to the ED with complaints of right arm redness/warmth and left neck redness and warmth  Per chart review, patient left against medical advice yesterday from [facility] for for arrival from possible foreign body of the neck and DVT of the Left Axillary Vein  Patient has not been compliant with her Eliquis but was receiving Lovenox on the hospital   Patient states she was picked up by a random person she met on Facebook and did inject drugs  Patient states she did have sexual intercourse last night and is concerned about potential exposure to HIV  Patient states she is feeling anxious and having palpitations most likely secondary to drug withdrawal   Patient states the events last night scared me" and she is willing to stay in the hospital at this time for IV antibiotics and would like to go to a rehabilitation facility        History provided by:  Patient      None       Past Medical History:   Diagnosis Date    Abnormal Pap smear of cervix     Anxiety     Depression     Endocarditis     2018    Hepatitis C     HPV (human papilloma virus) anogenital infection        Past Surgical History:   Procedure Laterality Date    IR PICC LINE PLACEMENT DOUBLE LUMEN  8/26/2020    KNEE SURGERY Left     MOUTH SURGERY      MA REPLACE TRICUSPID W CP BYPASS N/A 9/10/2020    Procedure: REPAIR VALVE TRICUSPID with [employer] 30mm 3D Contour  Annuloplasty Ring;  Surgeon: Juan Manuel Mehta MD;  Location: [facility];  Service: Cardiac Surgery    TOOTH EXTRACTION N/A 9/7/2020    Procedure: EXTRACTION TOOTH 32;  Surgeon: Curtis Wilson DMD;  Location: [facility];  Service: Maxillofacial History reviewed  No pertinent family history  I have reviewed and agree with the history as documented  E-Cigarette/Vaping    E-Cigarette Use Never User      E-Cigarette/Vaping Substances     Social History     Tobacco Use    Smoking status: Current Every Day Smoker     Packs/day: 0 50     Types: Cigarettes     Last attempt to quit: 2016     Years since quittin 3    Smokeless tobacco: Never Used   Substance Use Topics    Alcohol use: Yes     Alcohol/week: 0 0 standard drinks     Frequency: Monthly or less     Binge frequency: Never    Drug use: Yes     Types: Heroin, Marijuana, Benzodiazepines, Cocaine       Review of Systems   Constitutional: Negative for appetite change, chills, fever and unexpected weight change  HENT: Negative for congestion, drooling, ear pain, rhinorrhea, sore throat, trouble swallowing and voice change  Eyes: Negative for pain, discharge, redness and visual disturbance  Respiratory: Negative for cough, shortness of breath, wheezing and stridor  Cardiovascular: Positive for palpitations  Negative for chest pain and leg swelling  Gastrointestinal: Negative for abdominal pain, blood in stool, constipation, diarrhea, nausea and vomiting  Genitourinary: Negative for dysuria, flank pain, frequency, hematuria and urgency  Musculoskeletal: Negative for gait problem, joint swelling, neck pain and neck stiffness  Arm swelling   Skin: Positive for color change and wound  Negative for rash  Neurological: Negative for dizziness, seizures, light-headedness and headaches  Psychiatric/Behavioral: The patient is nervous/anxious  Physical Exam  Physical Exam  Vitals signs and nursing note reviewed  Constitutional:       Appearance: She is well-developed  HENT:      Head: Normocephalic and atraumatic  Nose: Nose normal    Eyes:      Conjunctiva/sclera: Conjunctivae normal       Pupils: Pupils are equal, round, and reactive to light  Cardiovascular:      Rate and Rhythm: Normal rate and regular rhythm  Pulmonary:      Effort: Pulmonary effort is normal       Breath sounds: Normal breath sounds  Musculoskeletal: Normal range of motion  Skin:     General: Skin is warm and dry  Capillary Refill: Capillary refill takes less than 2 seconds  Findings: Erythema and rash present  Comments: Superficial abrasions/scabs to the nose and hands  Superficial erythema and warmth to the right forearm  Erythema and swelling of the left neck without fluctuance or drainage  No streaking  Neurological:      Mental Status: She is alert and oriented to person, place, and time  Psychiatric:         Mood and Affect: Mood is anxious  Affect is tearful           Vital Signs  ED Triage Vitals   Temperature Pulse Respirations Blood Pressure SpO2   03/17/21 0827 03/17/21 0827 03/17/21 0827 03/17/21 0827 03/17/21 0827   98 1 °F (36 7 °C) 60 18 121/79 98 %      Temp Source Heart Rate Source Patient Position - Orthostatic VS BP Location FiO2 (%)   03/17/21 0827 03/17/21 0827 03/17/21 0827 03/17/21 0827 --   Oral Monitor Sitting Right arm       Pain Score       03/17/21 1219       Worst Possible Pain           Vitals:    03/17/21 0827 03/17/21 1015 03/17/21 1219   BP: 121/79 124/74 117/81   Pulse: 60 72 77   Patient Position - Orthostatic VS: Sitting Lying Lying         Visual Acuity      ED Medications  Medications   sodium chloride (PF) 0 9 % injection 3 mL (has no administration in time range)   nicotine (NICODERM CQ) 14 mg/24hr TD 24 hr patch 14 mg (14 mg Transdermal Medication Applied 3/17/21 0906)   emtricitabine-tenofovir (TRUVADA 200-300) combo dose ( Oral Given 3/17/21 0953)   vancomycin (VANCOCIN) 1,250 mg in sodium chloride 0 9 % 250 mL IVPB (has no administration in time range)   enoxaparin (LOVENOX) subcutaneous injection 70 mg (70 mg Subcutaneous Given 3/17/21 1319)   aspirin (ECOTRIN LOW STRENGTH) EC tablet 81 mg (81 mg Oral Given 3/17/21 1321)   LORazepam (ATIVAN) tablet 1 mg (1 mg Oral Given 3/17/21 1319)   traZODone (DESYREL) tablet 50 mg (has no administration in time range)   cloNIDine (CATAPRES) tablet 0 2 mg (0 2 mg Oral Given 3/17/21 1319)   nicotine (NICODERM CQ) 14 mg/24hr TD 24 hr patch 1 patch (has no administration in time range)   cefepime (MAXIPIME) 2 g/50 mL dextrose IVPB (0 mg Intravenous Stopped 3/17/21 0958)   vancomycin (VANCOCIN) IVPB (premix in dextrose) 1,000 mg 200 mL (0 mg/kg × 63 5 kg Intravenous Stopped 3/17/21 1100)   lidocaine (LMX) 4 % cream (1 application Topical Given 3/17/21 0906)   clonazePAM (KlonoPIN) tablet 1 mg (1 mg Oral Given 3/17/21 0947)   ondansetron (ZOFRAN) injection 4 mg (4 mg Intravenous Given 3/17/21 0954)   raltegravir (ISENTRESS) tablet 400 mg (400 mg Oral Given 3/17/21 0953)   LORazepam (ATIVAN) injection 1 mg (1 mg Intravenous Given 3/17/21 1123)       Diagnostic Studies  Results Reviewed     Procedure Component Value Units Date/Time    Blood culture #1 [[id number]] Collected: 03/17/21 0920    Lab Status: Preliminary result Specimen: Blood from Hand, Right Updated: 03/17/21 1301     Blood Culture Received in Microbiology Lab  Culture in Progress  Blood culture #2 [[id number]] Collected: 03/17/21 0928    Lab Status: Preliminary result Specimen: Blood from Arm, Right Updated: 03/17/21 1301     Blood Culture Received in Microbiology Lab  Culture in Progress  Lactic Acid [[id number]]  (Normal) Collected: 03/17/21 0928    Lab Status: Final result Specimen: Blood from Arm, Right Updated: 03/17/21 1007     LACTIC ACID 1 2 mmol/L     Narrative:      Result may be elevated if tourniquet was used during collection      Comprehensive metabolic panel [[id number]] Collected: 03/17/21 0928    Lab Status: Final result Specimen: Blood from Arm, Right Updated: 03/17/21 1001     Sodium 138 mmol/L      Potassium 3 7 mmol/L      Chloride 103 mmol/L      CO2 30 mmol/L      ANION GAP 5 mmol/L      BUN 9 mg/dL Creatinine 0 67 mg/dL      Glucose 112 mg/dL      Calcium 9 3 mg/dL      AST 40 U/L      ALT 38 U/L      Alkaline Phosphatase 84 U/L      Total Protein 7 5 g/dL      Albumin 3 8 g/dL      Total Bilirubin 0 31 mg/dL      eGFR 120 ml/min/1 73sq m     Narrative:      National Kidney Disease Foundation guidelines for Chronic Kidney Disease (CKD):     Stage 1 with normal or high GFR (GFR > 90 mL/min/1 73 square meters)    Stage 2 Mild CKD (GFR = 60-89 mL/min/1 73 square meters)    Stage 3A Moderate CKD (GFR = 45-59 mL/min/1 73 square meters)    Stage 3B Moderate CKD (GFR = 30-44 mL/min/1 73 square meters)    Stage 4 Severe CKD (GFR = 15-29 mL/min/1 73 square meters)    Stage 5 End Stage CKD (GFR <15 mL/min/1 73 square meters)  Note: GFR calculation is accurate only with a steady state creatinine    Troponin I [[id number]]  (Abnormal) Collected: 03/17/21 0928    Lab Status: Final result Specimen: Blood from Arm, Right Updated: 03/17/21 0959     Troponin I 2 22 ng/mL     Protime-INR [[id number]]  (Normal) Collected: 03/17/21 0928    Lab Status: Final result Specimen: Blood from Arm, Right Updated: 03/17/21 0955     Protime 13 1 seconds      INR 0 98    APTT [[id number]]  (Normal) Collected: 03/17/21 0928    Lab Status: Final result Specimen: Blood from Arm, Right Updated: 03/17/21 0955     PTT 28 seconds     CBC and differential [[id number]]  (Abnormal) Collected: 03/17/21 0928    Lab Status: Final result Specimen: Blood from Arm, Right Updated: 03/17/21 0940     WBC 7 35 Thousand/uL      RBC 4 39 Million/uL      Hemoglobin 12 7 g/dL      Hematocrit 37 4 %      MCV 85 fL      MCH 28 9 pg      MCHC 34 0 g/dL      RDW 12 9 %      MPV 8 5 fL      Platelets 993 Thousands/uL      nRBC 0 /100 WBCs      Neutrophils Relative 58 %      Immat GRANS % 1 %      Lymphocytes Relative 31 %      Monocytes Relative 7 %      Eosinophils Relative 3 %      Basophils Relative 0 %      Neutrophils Absolute 4 30 Thousands/µL      Immature Grans Absolute 0 05 Thousand/uL      Lymphocytes Absolute 2 25 Thousands/µL      Monocytes Absolute 0 52 Thousand/µL      Eosinophils Absolute 0 21 Thousand/µL      Basophils Absolute 0 02 Thousands/µL     Procalcitonin with AM Reflex [[id number]] Collected: 03/17/21 0928    Lab Status: In process Specimen: Blood from Arm, Right Updated: 03/17/21 0936    UA w Reflex to Microscopic w Reflex to Culture [[id number]]  (Abnormal) Collected: 03/17/21 0846    Lab Status: Final result Specimen: Urine, Other Updated: 03/17/21 0912     Color, UA Light Yellow     Clarity, UA Clear     Specific [place], UA 1 020     pH, UA 8 5     Leukocytes, UA Negative     Nitrite, UA Negative     Protein, UA Negative mg/dl      Glucose, UA Negative mg/dl      Ketones, UA Negative mg/dl      Urobilinogen, UA 0 2 E U /dl      Bilirubin, UA Negative     Blood, UA Negative    Rapid drug screen, urine [[id number]]  (Abnormal) Collected: 03/17/21 0846    Lab Status: Final result Specimen: Urine, Other Updated: 03/17/21 0912     Amph/Meth UR Positive     Barbiturate Ur Negative     Benzodiazepine Urine Positive     Cocaine Urine Negative     Methadone Urine Negative     Opiate Urine Negative     PCP Ur Negative     THC Urine Negative     Oxycodone Urine Positive    Narrative:      Presumptive report  If requested, specimen will be sent to reference lab for confirmation  FOR MEDICAL PURPOSES ONLY  IF CONFIRMATION NEEDED PLEASE CONTACT THE LAB WITHIN 5 DAYS      Drug Screen Cutoff Levels:  AMPHETAMINE/METHAMPHETAMINES  1000 ng/mL  BARBITURATES     200 ng/mL  BENZODIAZEPINES     200 ng/mL  COCAINE      300 ng/mL  METHADONE      300 ng/mL  OPIATES      300 ng/mL  PHENCYCLIDINE     25 ng/mL  THC       50 ng/mL  OXYCODONE      100 ng/mL    POCT pregnancy, urine [[id number]]  (Normal) Resulted: 03/17/21 0843    Lab Status: Final result Updated: 03/17/21 0844     EXT PREG TEST UR (Ref: Negative) Negative     Control Valid    Urine Macroscopic, POC [[id number]]  (Abnormal) Collected: 03/17/21 0841    Lab Status: Final result Specimen: Urine Updated: 03/17/21 0843     Color, UA Yellow     Clarity, UA Clear     pH, UA 8 5     Leukocytes, UA Negative     Nitrite, UA Negative     Protein, UA Negative mg/dl      Glucose, UA Negative mg/dl      Ketones, UA Negative mg/dl      Urobilinogen, UA 0 2 E U /dl      Bilirubin, UA Negative     Blood, UA Negative     Specific Gravity, UA 1 020    Narrative:      [address] [place] [address]    (Results Pending)              Procedures  ECG 12 Lead Documentation Only    Date/Time: 3/17/2021 8:58 AM  Performed by: Dio Mckeon PA-C  Authorized by: Ward Han DO     Indications / Diagnosis:  Cellulitis  ECG reviewed by me, the ED Provider: yes    Patient location:  ED  Previous ECG:     Previous ECG:  Compared to current  Rate:     ECG rate:  60    ECG rate assessment: normal    Rhythm:     Rhythm: sinus rhythm    QRS:     QRS axis:  Normal  ST segments:     ST segments:  Normal  T waves:     T waves: inverted      Inverted:  AVL  Comments:      QT/QTc 414/414             ED Course  ED Course as of Mar 17 1337   Wed Mar 17, 2021   0949 Spoke with pharmacist who recommends Truvada QD x 28 days and Isentress BID x 28 days for post-exposure HIV prophylaxis  1050 Case discussed with SLIM who is agreeable to admission  1055 Case discussed with surgery who will evaluate the patient  1055 Results discussed with patient  Patient is willing to stay at this time and undergo treatment  Patient is requesting additional medication for withdrawal symptoms at this time                   HEART Risk Score      Most Recent Value   Heart Score Risk Calculator   History  0 Filed at: 03/17/2021 1336   ECG  0 Filed at: 03/17/2021 1336   Age  0 Filed at: 03/17/2021 1336   Risk Factors  0 Filed at: 03/17/2021 1336   Troponin  2 Filed at: 03/17/2021 1336   HEART Score  2 Filed at: 03/17/2021 1336 MDM  Number of Diagnoses or Management Options  Acute deep vein thrombosis (DVT) of axillary vein of left upper extremity ([address] ): new and requires workup  Cellulitis of neck: new and requires workup  Cellulitis of right arm: new and requires workup  Elevated troponin: new and requires workup  IVDU (intravenous drug user): new and requires workup  Possible exposure to STD: new and requires workup  Diagnosis management comments: EKG unremarkable  Labs significant for elevated troponin  Urine drug screen positive for amphetamines/ methamphetamines, benzodiazepines and opioids  Patient has evidence of cellulitis on examination  Due to known DVT of the left upper arm, decision was made to place IV proximal to the right arm cellulitis  Patient is requesting medications for HIV post exposure prophylaxis  I did discuss with the pharmacist who recommends Truvada once a day for 28 days and Isentress BID for 28 days  Patient states she is willing to take the medications required for post exposure prophylaxis  Case was discussed with surgery who will evaluate patient in the emergency room  Case was discussed with son who is agreeable to admission  We had a detailed discussion of the patient's condition and case, including need for admission   Accepts to his/her service   Bed request/bridging orders placed          Amount and/or Complexity of Data Reviewed  Clinical lab tests: ordered and reviewed  Tests in the radiology section of CPT®: reviewed  Review and summarize past medical records: yes        Disposition  Final diagnoses:   Cellulitis of neck   Cellulitis of right arm   Acute deep vein thrombosis (DVT) of axillary vein of left upper extremity (HCC)   IVDU (intravenous drug user)   Possible exposure to STD   Elevated troponin     Time reflects when diagnosis was documented in both MDM as applicable and the Disposition within this note     Time User Action Codes Description Comment 3/17/2021  8:42 AM Vida Stout Add [J77 304] Cellulitis of neck     3/17/2021  8:44 AM Vida Stout Add [Z31 520] Cellulitis of right arm     3/17/2021  8:44 AM [place] Qu[place] Maggie Add [I82  A12] Acute deep vein thrombosis (DVT) of axillary vein of left upper extremity ([address] )     3/17/2021  8:44 AM Vida Stout Add [F19 90] IVDU (intravenous drug user)     3/17/2021  8:44 AM Vida Girish Add [Z20 2] Possible exposure to STD     3/17/2021 10:18 AM Vida Stout Add [R77 8] Elevated troponin       ED Disposition     ED Disposition Condition Date/Time Comment    Admit Stable Wed Mar 17, 2021 10:42 AM Case was discussed with CATHY and the patient's admission status was agreed to be Admission Status: inpatient status to the service of Dr Arden Gaucher  Follow-up Information    None         There are no discharge medications for this patient  No discharge procedures on file      PDMP Review       Value Time User    PDMP Reviewed  Yes 3/15/2021 12:08 PM Reyna Dillon MD          ED Provider  Electronically Signed by           Freddy Torres PA-C  03/17/21 [address], PA-C  03/17/21 1075

## 2021-03-17 NOTE — ASSESSMENT & PLAN NOTE
· Patient noncompliant with Eliquis, will utilize therapeutic Lovenox while admitted  · Transition to PO anticoagulation at discharge

## 2021-03-17 NOTE — ASSESSMENT & PLAN NOTE
· Patient had high risk vaginal intercourse, possibly in exchange for drugs   · Received HIV exposure meds in ED  · ID consult appreciated   · Continue Isentress and Truvada for PREP for 28 days   · Check HIV 6 week, 3 month, 6 months   · Follow up RPR, GC/Chlamydia   · Monitor CBC, CMP

## 2021-03-17 NOTE — PROGRESS NOTES
Vancomycin Assessment    Diana Bernabe is a 31 y o  female who is currently receiving vancomycin 1000 mg IV q12h for skin-soft tissue infection     Relevant clinical data and objective history reviewed:  Creatinine   Date Value Ref Range Status   03/17/2021 0 67 0 60 - 1 30 mg/dL Final     Comment:     Standardized to IDMS reference method   03/16/2021 0 57 (L) 0 60 - 1 30 mg/dL Final     Comment:     Standardized to IDMS reference method   03/15/2021 0 57 (L) 0 60 - 1 30 mg/dL Final     Comment:     Standardized to IDMS reference method     /81 (BP Location: Left arm)   Pulse 77   Temp 98 °F (36 7 °C) (Oral)   Resp 20   Wt 65 kg (143 lb 4 8 oz)   SpO2 97%   BMI 23 85 kg/m²   No intake/output data recorded  Lab Results   Component Value Date/Time    BUN 9 03/17/2021 09:28 AM    WBC 7 35 03/17/2021 09:28 AM    WBC 8 4 07/26/2016 12:00 AM    HGB 12 7 03/17/2021 09:28 AM    HGB 13 3 03/17/2017 10:19 AM    HCT 37 4 03/17/2021 09:28 AM    HCT 36 1 10/27/2016 11:35 AM    MCV 85 03/17/2021 09:28 AM    MCV 90 07/26/2016 12:00 AM     03/17/2021 09:28 AM     07/26/2016 12:00 AM     Temp Readings from Last 3 Encounters:   03/17/21 98 °F (36 7 °C) (Oral)   03/16/21 98 °F (36 7 °C)   03/11/21 98 1 °F (36 7 °C) (Oral)         Assessment/Plan  The patient is currently on vancomycin utilizing scheduled dosing  Baseline risks associated with therapy include: concomitant nephrotoxic medications  The patient is receiving 1000 mg IV q12h and will change the dose to 1250 mg IV q12h based on a goal of 10-15 (mild infection/cellulitis)  Pharmacy will continue to follow closely for s/sx of nephrotoxicity, infusion reactions, and appropriateness of therapy  BMP and CBC will be ordered per protocol  Plan for trough as patient approaches steady state, prior to the 4th  dose at approximately 2130 on 03/18   Pharmacy will continue to follow the patients culture results and clinical progress daily      Sophia Mueller, Pharmacist

## 2021-03-17 NOTE — ED NOTES
Pt belonging and body checked, female staff at bedside for body check  Belongings searched and kept at Rn station  Security outside room        Kait Olguin RN  03/17/21 0900

## 2021-03-17 NOTE — CONSULTS
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    Septic embolism (HCC)    Hypokalemia    Bipolar 1 disorder ([facility] [address] )    Overview Deleted 10/22/2020 12:32 PM by Danyel Lara            Right hip pain    Intravenous drug abuse ([facility] )    DVT of axillary vein, acute left (HCC)    Elevated troponin    Rash    Anemia    Chronic hepatitis C virus infection (HCC)    Chronic, continuous use of opioids    Hyperphosphatemia    S/P TVR (tricuspid valve repair)    Chronic anticoagulation    GERD (gastroesophageal reflux disease)    Suspected UTI (urinary tract infection)    Sacroiliitis (HCC)    Cellulitis of neck    Generalized anxiety disorder    Left against medical advice    Right forearm cellulitis          30 yo F PMH IVDU, hx of DVT axillary V (8/2020, noncompliant with Eliquis), hepatitis, endocarditis s/p TVRepair (9/2020), here with R forearm cellulitis, retained FB in L neck  B/l neck phlegmon  R neck erythema/cellulitis improving with IV abx  Indurated but no fluctuance  L neck with induration, minimal erythema  No streaking, crepitus b/l   Very small area of fluctuance in middle of L neck phlegmon  R forearm cellulitis  AVSS, no leukocytosis      Plan:  - no surgical intervention warranted at this time  - if worsens, can get u/s to evaluate for developing abscess  - f/u right forearm u/s  - cont IV abx as neck seems to be improving with it  - f/u ID c/s  - rest of care per primary    Reason for Consult / Principal Problem:    HPI: Yvonne Hansen is a 31y o  year old female PMH IVDU, hx of DVT axillary vein (on Eliquis), hepatitis, endocarditis s/p TVRepair who presents with R forearm cellulitis, retained FB in neck  Pt left [facility] AMA yesterday  Surgery was consulted prior to d/c and recommended IV abx, no surgical intervention at that time  Last IVDU cocaine yesterday after leaving [facility], injected into right forearm, believes to have "missed a little"  Wants to go to rehab after discharge  Per pt, has previously failed rehab 33 times  Pt believes needle broke off in L neck about 1 week ago, has not injected in that area since  On Eliquis for DVT hx 20, noncompliant with medication  Review of Systems   Constitutional: Negative for chills and fever  Gastrointestinal: Positive for constipation and nausea  All other systems reviewed and are negative        Historical Information   Past Medical History:   Diagnosis Date    Abnormal Pap smear of cervix     Anxiety     Depression     Endocarditis     2018    Hepatitis C     HPV (human papilloma virus) anogenital infection      Past Surgical History:   Procedure Laterality Date    IR PICC LINE PLACEMENT DOUBLE LUMEN  2020    KNEE SURGERY Left     MOUTH SURGERY      NH REPLACE TRICUSPID W CP BYPASS N/A 9/10/2020    Procedure: REPAIR VALVE TRICUSPID with Medtronic 30mm 3D Contour  Annuloplasty Ring;  Surgeon: Lashae Noonan MD;  [facility];  Service: Cardiac Surgery    TOOTH EXTRACTION N/A 2020    Procedure: EXTRACTION TOOTH 28;  Surgeon: Gavi Galindo DMD;  [facility];  Service: Maxillofacial     Social History   Social History     Substance and Sexual Activity   Alcohol Use Yes    Alcohol/week: 0 0 standard drinks    Frequency: Monthly or less    Binge frequency: Never     Social History     Substance and Sexual Activity   Drug Use Yes    Types: Heroin, Marijuana, Benzodiazepines, Cocaine     Social History     Tobacco Use   Smoking Status Current Every Day Smoker    Packs/day: 0 50    Types: Cigarettes    Last attempt to quit: 2016    Years since quittin 3   Smokeless Tobacco Never Used     History reviewed  No pertinent family history  Meds/Allergies     (Not in a hospital admission)    Current Facility-Administered Medications   Medication Dose Route Frequency    cloNIDine (CATAPRES) tablet 0 2 mg  0 2 mg Oral Q12H [address]    emtricitabine-tenofovir (TRUVADA 200-300) combo dose   Oral Daily    LORazepam (ATIVAN) tablet 1 mg  1 mg Oral Q6H PRN    nicotine (NICODERM CQ) 14 mg/24hr TD 24 hr patch 14 mg  14 mg Transdermal Once    sodium chloride (PF) 0 9 % injection 3 mL  3 mL Intravenous Q1H PRN    traZODone (DESYREL) tablet 50 mg  50 mg Oral HS       Allergies   Allergen Reactions    Cat Hair Extract     Dog Epithelium     Latex     Pollen Extract        Objective     Blood pressure 124/74, pulse 72, temperature 98 1 °F (36 7 °C), temperature source Oral, resp  rate 16, weight 65 kg (143 lb 4 8 oz), SpO2 97 %, not currently breastfeeding  Intake/Output Summary (Last 24 hours) at 3/17/2021 1213  Last data filed at 3/17/2021 1100  Gross per 24 hour   Intake 250 ml   Output --   Net 250 ml       PHYSICAL EXAM  Physical Exam  Vitals signs and nursing note reviewed  Constitutional:       General: She is not in acute distress  Appearance: She is not ill-appearing, toxic-appearing or diaphoretic  Neck:      Comments: B/l neck with phlegmon  R neck appears to be improving when compared to previous images from \[facility] [place] [facility]\""  No further erythema  Indurated, no fluctuance or crepitus  L neck with minimal erythema  Indurated with very small fluctuant area in middle  No crepitus  Cardiovascular:      Rate and Rhythm: Normal rate  Pulmonary:      Effort: Pulmonary effort is normal  No respiratory distress  Musculoskeletal:      Comments: R forearm with cellulitis, indurated but no fluctuance appreciated  Tender to touch  Skin:     General: Skin is warm  Capillary Refill: Capillary refill takes less than 2 seconds  Neurological:      General: No focal deficit present  Mental Status: She is alert and oriented to person, place, and time  Psychiatric:         Mood and Affect: Mood normal          Behavior: Behavior normal            Lab Results:   I have personally reviewed pertinent lab results  , CBC:   Lab Results   Component Value Date    WBC 7 35 03/17/2021    HGB 12 7 03/17/2021    HCT 37 4 03/17/2021    MCV 85 03/17/2021     03/17/2021    MCH 28 9 03/17/2021    MCHC 34 0 03/17/2021    RDW 12 9 03/17/2021    MPV 8 5 (L) 03/17/2021    NRBC 0 03/17/2021   , CMP:   Lab Results   Component Value Date    SODIUM 138 03/17/2021    K 3 7 03/17/2021     03/17/2021    CO2 30 03/17/2021    BUN 9 03/17/2021    CREATININE 0 67 03/17/2021    CALCIUM 9 3 03/17/2021    AST 40 03/17/2021    ALT 38 03/17/2021    ALKPHOS 84 03/17/2021    EGFR 120 03/17/2021   , Coagulation:   Lab Results   Component Value Date    INR 0 98 03/17/2021   , Urinalysis:   Lab Results   Component Value Date    COLORU Light Yellow 03/17/2021    CLARITYU Clear 03/17/2021    SPECGRAV 1 020 03/17/2021    PHUR 8 5 (A) 03/17/2021    PHUR 8 5 (H) 03/17/2021    LEUKOCYTESUR Negative 03/17/2021    NITRITE Negative 03/17/2021    GLUCOSEU Negative 03/17/2021    KETONESU Negative 03/17/2021    BILIRUBINUR Negative 03/17/2021    BLOODU Negative 03/17/2021   , Amylase: No results found for: AMYLASE, Lipase: No results found for: LIPASE  Imaging: I have personally reviewed pertinent reports  EKG, Pathology, and Other Studies: I have personally reviewed pertinent reports  Counseling / Coordination of Care  Total time spent today  30 minutes  Greater than 50% of total time was spent with the patient and / or family counseling and / or coordination of care           Mt [place] PA-C  3/17/2021 12:13 PM

## 2021-03-17 NOTE — H&P
H&P Exam - Antony Gutiérrez 31 y o  female MRN: [id number]    Unit/Bed#: S -01 Encounter: [id number]    Right forearm cellulitis  Assessment & Plan  With associated fluctuance and IV drug use  Blood cultures obtained x2 in the ED  Initiate vancomycin, pharmacy assist with dosing  US Forearm to rule out abscess   Cool compresses    Elevated troponin  Assessment & Plan  Likely non MI troponin elevation but she has a history of TV repair due to endocarditis  Tele monitoring  Start baby aspirin  Consult Cardiology     Retained foreign body  Assessment & Plan  Retained needle in left neck  Surgical consult , recommend removal as there appears to be phlegmon forming     Exposure to STD  Assessment & Plan  Received HIV exposure meds in ED  Consult ID     DVT of axillary vein, acute left [facility])  Assessment & Plan  Patient noncompliant with Eliquis, will utilize therapeutic Lovenox while admitted    Intravenous drug abuse (HCC)  Assessment & Plan  Heroin / Meth / Cocaine  Clonidine   2mg BID  Trazodone 50mg HS  Ativan 1mg PO q6 PRN                 History of Present Illness      The patient is a 82-year-old female with past medical history significant for tricuspid valve endocarditis status post repair who was admitted to [facility] for left neck pain secondary to retained needle fragment  The patient was frustrated and signed out against medical advice, and states she used IV cocaine and had unprotected sex  She presents today to the emergency department with complaint of right forearm swelling and redness in concern due to possible exposure to STD  She states she is noncompliant with Eliquis for    Review of Systems   Constitutional: Negative for fever  Skin: Positive for rash and wound  Psychiatric/Behavioral: The patient is nervous/anxious  All other systems reviewed and are negative        Historical Information   Past Medical History:   Diagnosis Date    Abnormal Pap smear of cervix     Anxiety     Depression     Endocarditis     2018    Hepatitis C     HPV (human papilloma virus) anogenital infection      Past Surgical History:   Procedure Laterality Date    IR PICC LINE PLACEMENT DOUBLE LUMEN  2020    KNEE SURGERY Left     MOUTH SURGERY      [facility] REPLACE TRICUSPID W CP BYPASS N/A 9/10/2020    Procedure: REPAIR VALVE TRICUSPID with Medtronic 30mm 3D Contour  Annuloplasty Ring;  Surgeon: Augusto Pruett MD;  [facility];  Service: Cardiac Surgery    TOOTH EXTRACTION N/A 2020    Procedure: EXTRACTION TOOTH 28;  Surgeon: Mary Ann Hannah DMD;  [facility];  Service: Maxillofacial     Social History   Social History     Substance and Sexual Activity   Alcohol Use Yes    Alcohol/week: 0 0 standard drinks    Frequency: Monthly or less    Binge frequency: Never     Social History     Substance and Sexual Activity   Drug Use Yes    Types: Heroin, Marijuana, Benzodiazepines, Cocaine     Social History     Tobacco Use   Smoking Status Current Every Day Smoker    Packs/day: 0 50    Types: Cigarettes    Last attempt to quit: 2016    Years since quittin 3   Smokeless Tobacco Never Used     E-Cigarette Use: Never User     E-Cigarette/Vaping Substances       Family History: non-contributory    Meds/Allergies   all medications and allergies reviewed  Allergies   Allergen Reactions    Cat Hair Extract     Dog Epithelium     Latex     Pollen Extract        Objective   First Vitals:   Blood Pressure: 121/79 (21 0827)  Pulse: 60 (21 08)  Temperature: 98 1 °F (36 7 °C) (21 08)  Temp Source: Oral (21 08)  Respirations: 18 (21 08)  Weight - Scale: 65 kg (143 lb 4 8 oz) (21 1023)  SpO2: 98 % (21 0827)    Current Vitals:   Blood Pressure: 117/81 (21 1219)  Pulse: 77 (21 1219)  Temperature: 98 °F (36 7 °C) (21 1219)  Temp Source: Oral (21 1219)  Respirations: 20 (21 1219)  Weight - Scale: 65 kg (143 lb 4 8 oz) (21 1023)  SpO2: 97 % (21 1219)      Intake/Output Summary (Last 24 hours) at 3/17/2021 1221  Last data filed at 3/17/2021 1100  Gross per 24 hour   Intake 250 ml   Output --   Net 250 ml       Invasive Devices     Peripheral Intravenous Line            Peripheral IV 21 Right Arm less than 1 day                Physical Exam  Vitals signs and nursing note reviewed  Constitutional:       Appearance: Normal appearance  HENT:      Head: Normocephalic and atraumatic  Right Ear: External ear normal       Left Ear: External ear normal       Nose: Nose normal       Mouth/Throat:      Mouth: Mucous membranes are moist    Eyes:      Pupils: Pupils are equal, round, and reactive to light  Neck:      Musculoskeletal: No neck rigidity  Comments: Palpable needle on right, no fluctuance noted  Cardiovascular:      Rate and Rhythm: Normal rate and regular rhythm  Pulmonary:      Effort: Pulmonary effort is normal  No respiratory distress  Breath sounds: No wheezing  Abdominal:      General: Abdomen is flat  Skin:     Capillary Refill: Capillary refill takes 2 to 3 seconds  Coloration: Skin is not jaundiced  Comments: Right forearm erythema with induration     Neurological:      General: No focal deficit present     Psychiatric:         Mood and Affect: Mood normal          Lab Results:   Lab Results   Component Value Date    WBC 7 35 2021    HGB 12 7 2021    HCT 37 4 2021    MCV 85 2021     2021     Lab Results   Component Value Date    SODIUM 138 2021    K 3 7 2021     2021    CO2 30 2021    AGAP 5 2021    BUN 9 2021    CREATININE 0 67 2021    GLUC 112 2021    CALCIUM 9 3 2021    AST 40 2021    ALT 38 2021    ALKPHOS 84 2021    TP 7 5 2021    TBILI 0 31 2021    EGFR 120 2021       Imagin [address] [facility] limited    (Results Pending)       EKG, Pathology, and Other Studies: NSR    Code Status: Prior  Advance Directive and Living Will:      Power of :    POLST:      Counseling / Coordination of Care:

## 2021-03-18 LAB
ALBUMIN SERPL BCP-MCNC: 3.4 G/DL (ref 3.5–5)
ALP SERPL-CCNC: 79 U/L (ref 46–116)
ALT SERPL W P-5'-P-CCNC: 41 U/L (ref 12–78)
ANION GAP SERPL CALCULATED.3IONS-SCNC: 13 MMOL/L (ref 4–13)
AST SERPL W P-5'-P-CCNC: 76 U/L (ref 5–45)
BASOPHILS # BLD AUTO: 0.02 THOUSANDS/ΜL (ref 0–0.1)
BASOPHILS NFR BLD AUTO: 0 % (ref 0–1)
BILIRUB SERPL-MCNC: 0.51 MG/DL (ref 0.2–1)
BUN SERPL-MCNC: 17 MG/DL (ref 5–25)
CALCIUM ALBUM COR SERPL-MCNC: 9.8 MG/DL (ref 8.3–10.1)
CALCIUM SERPL-MCNC: 9.3 MG/DL (ref 8.3–10.1)
CHLORIDE SERPL-SCNC: 103 MMOL/L (ref 100–108)
CO2 SERPL-SCNC: 21 MMOL/L (ref 21–32)
CREAT SERPL-MCNC: 1.01 MG/DL (ref 0.6–1.3)
EOSINOPHIL # BLD AUTO: 0.37 THOUSAND/ΜL (ref 0–0.61)
EOSINOPHIL NFR BLD AUTO: 6 % (ref 0–6)
ERYTHROCYTE [DISTWIDTH] IN BLOOD BY AUTOMATED COUNT: 13.1 % (ref 11.6–15.1)
GFR SERPL CREATININE-BSD FRML MDRD: 76 ML/MIN/1.73SQ M
GLUCOSE SERPL-MCNC: 90 MG/DL (ref 65–140)
HCT VFR BLD AUTO: 39.1 % (ref 34.8–46.1)
HGB BLD-MCNC: 13 G/DL (ref 11.5–15.4)
IMM GRANULOCYTES # BLD AUTO: 0.03 THOUSAND/UL (ref 0–0.2)
IMM GRANULOCYTES NFR BLD AUTO: 1 % (ref 0–2)
LYMPHOCYTES # BLD AUTO: 2.34 THOUSANDS/ΜL (ref 0.6–4.47)
LYMPHOCYTES NFR BLD AUTO: 40 % (ref 14–44)
MCH RBC QN AUTO: 28.6 PG (ref 26.8–34.3)
MCHC RBC AUTO-ENTMCNC: 33.2 G/DL (ref 31.4–37.4)
MCV RBC AUTO: 86 FL (ref 82–98)
MONOCYTES # BLD AUTO: 0.56 THOUSAND/ΜL (ref 0.17–1.22)
MONOCYTES NFR BLD AUTO: 10 % (ref 4–12)
NEUTROPHILS # BLD AUTO: 2.59 THOUSANDS/ΜL (ref 1.85–7.62)
NEUTS SEG NFR BLD AUTO: 43 % (ref 43–75)
NRBC BLD AUTO-RTO: 0 /100 WBCS
PLATELET # BLD AUTO: 244 THOUSANDS/UL (ref 149–390)
PMV BLD AUTO: 8.2 FL (ref 8.9–12.7)
POTASSIUM SERPL-SCNC: 5.1 MMOL/L (ref 3.5–5.3)
PROCALCITONIN SERPL-MCNC: <0.05 NG/ML
PROT SERPL-MCNC: 7.4 G/DL (ref 6.4–8.2)
RBC # BLD AUTO: 4.55 MILLION/UL (ref 3.81–5.12)
SODIUM SERPL-SCNC: 137 MMOL/L (ref 136–145)
WBC # BLD AUTO: 5.91 THOUSAND/UL (ref 4.31–10.16)

## 2021-03-18 PROCEDURE — 87491 CHLMYD TRACH DNA AMP PROBE: CPT | Performed by: PHYSICIAN ASSISTANT

## 2021-03-18 PROCEDURE — 84145 PROCALCITONIN (PCT): CPT | Performed by: PHYSICIAN ASSISTANT

## 2021-03-18 PROCEDURE — 99232 SBSQ HOSP IP/OBS MODERATE 35: CPT | Performed by: INTERNAL MEDICINE

## 2021-03-18 PROCEDURE — 86592 SYPHILIS TEST NON-TREP QUAL: CPT | Performed by: PHYSICIAN ASSISTANT

## 2021-03-18 PROCEDURE — 99232 SBSQ HOSP IP/OBS MODERATE 35: CPT | Performed by: SURGERY

## 2021-03-18 PROCEDURE — 80202 ASSAY OF VANCOMYCIN: CPT | Performed by: INTERNAL MEDICINE

## 2021-03-18 PROCEDURE — 80053 COMPREHEN METABOLIC PANEL: CPT | Performed by: FAMILY MEDICINE

## 2021-03-18 PROCEDURE — 85025 COMPLETE CBC W/AUTO DIFF WBC: CPT | Performed by: FAMILY MEDICINE

## 2021-03-18 PROCEDURE — 87591 N.GONORRHOEAE DNA AMP PROB: CPT | Performed by: PHYSICIAN ASSISTANT

## 2021-03-18 PROCEDURE — 99233 SBSQ HOSP IP/OBS HIGH 50: CPT | Performed by: INTERNAL MEDICINE

## 2021-03-18 PROCEDURE — NC001 PR NO CHARGE: Performed by: PHYSICIAN ASSISTANT

## 2021-03-18 RX ORDER — CLONAZEPAM 0.5 MG/1
0.5 TABLET ORAL 2 TIMES DAILY
Status: DISCONTINUED | OUTPATIENT
Start: 2021-03-18 | End: 2021-03-21 | Stop reason: HOSPADM

## 2021-03-18 RX ORDER — LORAZEPAM 2 MG/ML
0.5 INJECTION INTRAMUSCULAR ONCE
Status: COMPLETED | OUTPATIENT
Start: 2021-03-18 | End: 2021-03-18

## 2021-03-18 RX ORDER — IBUPROFEN 600 MG/1
600 TABLET ORAL EVERY 6 HOURS PRN
Status: DISCONTINUED | OUTPATIENT
Start: 2021-03-18 | End: 2021-03-19

## 2021-03-18 RX ORDER — ONDANSETRON 2 MG/ML
4 INJECTION INTRAMUSCULAR; INTRAVENOUS EVERY 6 HOURS PRN
Status: DISCONTINUED | OUTPATIENT
Start: 2021-03-18 | End: 2021-03-21 | Stop reason: HOSPADM

## 2021-03-18 RX ORDER — QUETIAPINE FUMARATE 25 MG/1
25 TABLET, FILM COATED ORAL
Status: DISCONTINUED | OUTPATIENT
Start: 2021-03-18 | End: 2021-03-21 | Stop reason: HOSPADM

## 2021-03-18 RX ORDER — IBUPROFEN 400 MG/1
400 TABLET ORAL ONCE
Status: COMPLETED | OUTPATIENT
Start: 2021-03-18 | End: 2021-03-18

## 2021-03-18 RX ADMIN — ONDANSETRON 4 MG: 2 INJECTION INTRAMUSCULAR; INTRAVENOUS at 12:37

## 2021-03-18 RX ADMIN — RALTEGRAVIR 400 MG: 400 TABLET, FILM COATED ORAL at 22:18

## 2021-03-18 RX ADMIN — ENOXAPARIN SODIUM 70 MG: 80 INJECTION SUBCUTANEOUS at 10:23

## 2021-03-18 RX ADMIN — QUETIAPINE FUMARATE 25 MG: 25 TABLET ORAL at 00:34

## 2021-03-18 RX ADMIN — LORAZEPAM 0.5 MG: 2 INJECTION INTRAMUSCULAR; INTRAVENOUS at 04:04

## 2021-03-18 RX ADMIN — SODIUM CHLORIDE 125 ML/HR: 0.9 INJECTION, SOLUTION INTRAVENOUS at 04:02

## 2021-03-18 RX ADMIN — ONDANSETRON 4 MG: 2 INJECTION INTRAMUSCULAR; INTRAVENOUS at 23:18

## 2021-03-18 RX ADMIN — LORAZEPAM 0.5 MG: 2 INJECTION INTRAMUSCULAR; INTRAVENOUS at 17:32

## 2021-03-18 RX ADMIN — RALTEGRAVIR 400 MG: 400 TABLET, FILM COATED ORAL at 10:25

## 2021-03-18 RX ADMIN — QUETIAPINE FUMARATE 25 MG: 25 TABLET ORAL at 23:18

## 2021-03-18 RX ADMIN — CLONIDINE HYDROCHLORIDE 0.2 MG: 0.1 TABLET ORAL at 23:18

## 2021-03-18 RX ADMIN — IBUPROFEN 600 MG: 600 TABLET, FILM COATED ORAL at 12:36

## 2021-03-18 RX ADMIN — CLONAZEPAM 0.5 MG: 0.5 TABLET ORAL at 17:29

## 2021-03-18 RX ADMIN — ASPIRIN 81 MG: 81 TABLET, COATED ORAL at 10:24

## 2021-03-18 RX ADMIN — LORAZEPAM 0.5 MG: 2 INJECTION INTRAMUSCULAR; INTRAVENOUS at 12:37

## 2021-03-18 RX ADMIN — VANCOMYCIN HYDROCHLORIDE 1250 MG: 5 INJECTION, POWDER, LYOPHILIZED, FOR SOLUTION INTRAVENOUS at 10:22

## 2021-03-18 RX ADMIN — ENOXAPARIN SODIUM 70 MG: 80 INJECTION SUBCUTANEOUS at 22:18

## 2021-03-18 RX ADMIN — LORAZEPAM 0.5 MG: 2 INJECTION INTRAMUSCULAR; INTRAVENOUS at 01:16

## 2021-03-18 RX ADMIN — CLONAZEPAM 0.5 MG: 0.5 TABLET ORAL at 12:37

## 2021-03-18 RX ADMIN — LORAZEPAM 0.5 MG: 2 INJECTION INTRAMUSCULAR; INTRAVENOUS at 22:28

## 2021-03-18 RX ADMIN — EMTRICITABINE: 200 CAPSULE ORAL at 10:24

## 2021-03-18 RX ADMIN — IBUPROFEN 400 MG: 400 TABLET ORAL at 03:51

## 2021-03-18 RX ADMIN — SODIUM CHLORIDE 125 ML/HR: 0.9 INJECTION, SOLUTION INTRAVENOUS at 14:25

## 2021-03-18 NOTE — UTILIZATION REVIEW
Notification of Inpatient Admission/Inpatient Authorization Request   This is a Notification of Inpatient Admission for [address]  Be advised that this patient was admitted to our facility under Inpatient Status  Contact Guy Watters at [phone] for additional admission information  [address] [facility] DEPT  DEDICATED [phone]  Patient Name:   Lauren Lind   YOB: 1990       [address]   [address]:   [address]  Tax ID: [id number]  NPI: [id number] Attending Provider/NPI:  Address:  Phone: Yeni Dee, Anjana Kiser [[id number]]  Same as the facility  [phone]   Place of Service Code: 24 Place of Service Name:  [address] [place] [address]   Start Date: 3/17/21 1057     Discharge Date & Time: No discharge date for patient encounter  Type of Admission: Inpatient Status Discharge Disposition   (if discharged): Left against medical advice or discontinued care   Patient Diagnoses: Cellulitis of neck [L03 221]  Elevated troponin [R77 8]  IVDU (intravenous drug user) [F19 90]  Cellulitis of right arm [L03 113]  Possible exposure to STD [Z20 2]  Acute deep vein thrombosis (DVT) of axillary vein of left upper extremity ([address] ) Mary Baconon  A12]  Foreign body of neck [S10 95XA]     Orders: Admission Orders (From admission, onward)     Ordered        03/17/21 1057  Inpatient Admission  Once                    Assigned Utilization Review Contact: Guy Watters  Utilization   Network Utilization Review Department  Phone: [phone]; Fax [phone]  Email: [email or web address]   ATTENTION PAYERS: Please call the assigned Utilization  directly with any questions or concerns ALL voicemails in the department are confidential  Send all requests for admission clinical reviews, approved or denied determinations and any other requests to dedicated fax number belonging to the campus where the patient is receiving treatment

## 2021-03-18 NOTE — PROGRESS NOTES
Progress Note - Cardiology   Steve Fajardo 31 y o  female MRN: [id number]  Unit/Bed#: S -01 Encounter: [id number]        Principal Problem:    Right forearm cellulitis  Active Problems:    Intravenous drug abuse ([address] )    DVT of axillary vein, acute left (HCC)    Elevated troponin    Exposure to STD    Retained foreign body        Assessment/Plan    1  Elevated troponin  Suspect non MI troponin elevation in setting of active drug use  Peak 2 22, trending down  No cardiac symptoms   EKG- NSR no ischemia  Tele- refuses to wear  Periodic tachycardic noted prior to her removing, likely SVT ( in setting of withdrawal )  TTE- LVEF 45-50% with abnormal septal movement, possibly post thoracotomy  No further w/u at this time  Not suspected to be ischemic event  Would ask her to followup with cardiology  2   Right forearm cellulitis  On IV vanco  Negative procalcitonin, afebrile  Blood cultures negative to date  ID following    3  Retained foreign body neck- needle fragment  Surgical consultation -no surgical intervention time    4  Acute axillary vein DVT- noncompliant with Eliquis, currently on therapeutic lovenox    5  IVDA-   history of Heroin/ meth/cocaine  Active drug use  Used methadone 3/16  Reported wants to return to rehab     6   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  Blood cultures negative to date echo as above  Tricuspid valve trace regurgitation  PA systolic pressure normal              Subjective/Objective   Chief Complaint/Subjective  Spoke RN  Patient was up all night ,agitated  Refuses to wear telemetry  [place] last night  She reports she got tangled in her telemetry wires  She is asleep at this time  Arousable but does not stay awake  She has been receiving Ativan          Vitals: BP 90/56 (BP Location: Left arm)   Pulse 80   Temp 98 8 °F (37 1 °C) (Oral)   Resp 16   Wt 65 kg (143 lb 4 8 oz)   LMP 03/15/2021   SpO2 100%   BMI 23 85 kg/m² Vitals:    03/17/21 1023   Weight: 65 kg (143 lb 4 8 oz)     Orthostatic Blood Pressures      Most Recent Value   Blood Pressure  90/56 filed at 03/18/2021 0815   Patient Position - Orthostatic VS  Lying filed at 03/18/2021 0815            Intake/Output Summary (Last 24 hours) at 3/18/2021 0840  Last data filed at 3/18/2021 0402  Gross per 24 hour   Intake 1177 08 ml   Output 500 ml   Net 677 08 ml       Invasive Devices     Peripheral Intravenous Line            Peripheral IV 03/18/21 Distal;Right;Upper;Ventral (anterior) Arm less than 1 day                Current Facility-Administered Medications   Medication Dose Route Frequency    aspirin (ECOTRIN LOW STRENGTH) EC tablet 81 mg  81 mg Oral Daily    cloNIDine (CATAPRES) tablet 0 2 mg  0 2 mg Oral Q12H [address]    emtricitabine-tenofovir (TRUVADA 200-300) combo dose   Oral Daily    enoxaparin (LOVENOX) subcutaneous injection 70 mg  1 mg/kg Subcutaneous Q12H [address]    LORazepam (ATIVAN) injection 0 5 mg  0 5 mg Intravenous Q4H PRN    LORazepam (ATIVAN) tablet 1 mg  1 mg Oral Q6H PRN    metoprolol (LOPRESSOR) injection 5 mg  5 mg Intravenous Q6H PRN    nicotine (NICODERM CQ) 14 mg/24hr TD 24 hr patch 1 patch  1 patch Transdermal Daily    nicotine (NICODERM CQ) 14 mg/24hr TD 24 hr patch 14 mg  14 mg Transdermal Once    QUEtiapine (SEROquel) tablet 25 mg  25 mg Oral HS PRN    sodium chloride (PF) 0 9 % injection 3 mL  3 mL Intravenous Q1H PRN    sodium chloride 0 9 % infusion  125 mL/hr Intravenous Continuous    vancomycin (VANCOCIN) 1,250 mg in sodium chloride 0 9 % 250 mL IVPB  20 mg/kg Intravenous Q12H         Physical Exam: BP 90/56 (BP Location: Left arm)   Pulse 80   Temp 98 8 °F (37 1 °C) (Oral)   Resp 16   Wt 65 kg (143 lb 4 8 oz)   LMP 03/15/2021   SpO2 100%   BMI 23 85 kg/m²     General Appearance:    Sleep, awakened but  drowsy   Head:    Normocephalic, no scleral icterus                       Lungs:     Clear to auscultation bilaterally, respirations unlabored   Chest Wall:    No tenderness or deformity    Heart:    Regular rate and rhythm, S1 and S2 normal, no murmur, rub   or gallop       Extremities:   Extremities normal, atraumatic, no cyanosis or edema       Skin:   Skin color, texture, turgor normal, no rashes or lesions                     Lab Results:   Recent Results (from the past 72 hour(s))   Vancomycin, trough Draw level peripherally  Give dose immediately after trough (do NOT hold dose)  Call Pharmacy with any questions/concerns (Pharm Consult)      Collection Time: 03/15/21  3:39 PM   Result Value Ref Range    Vancomycin Tr 23 4 (HH) 10 0 - 20 0 ug/mL   CBC and differential    Collection Time: 03/16/21  6:11 AM   Result Value Ref Range    WBC 4 77 4 31 - 10 16 Thousand/uL    RBC 3 96 3 81 - 5 12 Million/uL    Hemoglobin 11 3 (L) 11 5 - 15 4 g/dL    Hematocrit 34 4 (L) 34 8 - 46 1 %    MCV 87 82 - 98 fL    MCH 28 5 26 8 - 34 3 pg    MCHC 32 8 31 4 - 37 4 g/dL    RDW 12 9 11 6 - 15 1 %    MPV 9 1 8 9 - 12 7 fL    Platelets 808 310 - 533 Thousands/uL    nRBC 0 /100 WBCs    Neutrophils Relative 29 (L) 43 - 75 %    Immat GRANS % 1 0 - 2 %    Lymphocytes Relative 55 (H) 14 - 44 %    Monocytes Relative 6 4 - 12 %    Eosinophils Relative 9 (H) 0 - 6 %    Basophils Relative 0 0 - 1 %    Neutrophils Absolute 1 40 (L) 1 85 - 7 62 Thousands/µL    Immature Grans Absolute 0 03 0 00 - 0 20 Thousand/uL    Lymphocytes Absolute 2 58 0 60 - 4 47 Thousands/µL    Monocytes Absolute 0 29 0 17 - 1 22 Thousand/µL    Eosinophils Absolute 0 45 0 00 - 0 61 Thousand/µL    Basophils Absolute 0 02 0 00 - 0 10 Thousands/µL   Basic metabolic panel    Collection Time: 03/16/21  6:11 AM   Result Value Ref Range    Sodium 141 136 - 145 mmol/L    Potassium 3 9 3 5 - 5 3 mmol/L    Chloride 108 100 - 108 mmol/L    CO2 29 21 - 32 mmol/L    ANION GAP 4 4 - 13 mmol/L    BUN 7 5 - 25 mg/dL    Creatinine 0 57 (L) 0 60 - 1 30 mg/dL    Glucose 122 65 - 140 mg/dL    Calcium 8 7 8 3 - 10 1 mg/dL    eGFR 127 ml/min/1 73sq m   Urine Macroscopic, POC    Collection Time: 03/17/21  8:41 AM   Result Value Ref Range    Color, UA Yellow     Clarity, UA Clear     pH, UA 8 5 (H) 4 5 - 8 0    Leukocytes, UA Negative Negative    Nitrite, UA Negative Negative    Protein, UA Negative Negative mg/dl    Glucose, UA Negative Negative mg/dl    Ketones, UA Negative Negative mg/dl    Urobilinogen, UA 0 2 0 2, 1 0 E U /dl E U /dl    Bilirubin, UA Negative Negative    Blood, UA Negative Negative    Specific Gravity, UA 1 020 1 003 - 1 030   POCT pregnancy, urine    Collection Time: 03/17/21  8:43 AM   Result Value Ref Range    EXT PREG TEST UR (Ref: Negative) Negative     Control Valid    UA w Reflex to Microscopic w Reflex to Culture    Collection Time: 03/17/21  8:46 AM    Specimen: Urine, Other   Result Value Ref Range    Color, UA Light Yellow     Clarity, UA Clear     Specific Gravity, UA 1 020 1 003 - 1 030    pH, UA 8 5 (A) 4 5, 5 0, 5 5, 6 0, 6 5, 7 0, 7 5, 8 0    Leukocytes, UA Negative Negative    Nitrite, UA Negative Negative    Protein, UA Negative Negative mg/dl    Glucose, UA Negative Negative mg/dl    Ketones, UA Negative Negative mg/dl    Urobilinogen, UA 0 2 0 2, 1 0 E U /dl E U /dl    Bilirubin, UA Negative Negative    Blood, UA Negative Negative   Rapid drug screen, urine    Collection Time: 03/17/21  8:46 AM   Result Value Ref Range    Amph/Meth UR Positive (A) Negative    Barbiturate Ur Negative Negative    Benzodiazepine Urine Positive (A) Negative    Cocaine Urine Negative Negative    Methadone Urine Negative Negative    Opiate Urine Negative Negative    PCP Ur Negative Negative    THC Urine Negative Negative    Oxycodone Urine Positive (A) Negative   ECG 12 lead    Collection Time: 03/17/21  8:58 AM   Result Value Ref Range    Ventricular Rate 60 BPM    Atrial Rate 60 BPM    MS Interval 120 ms    QRSD Interval 78 ms    QT Interval 414 ms    QTC Interval 414 ms    P Axis 34 degrees    QRS Axis 70 degrees    T Wave Axis 72 degrees   Blood culture #1    Collection Time: 03/17/21  9:20 AM    Specimen: Hand, Right; Blood   Result Value Ref Range    Blood Culture Received in Microbiology Lab  Culture in Progress      CBC and differential    Collection Time: 03/17/21  9:28 AM   Result Value Ref Range    WBC 7 35 4 31 - 10 16 Thousand/uL    RBC 4 39 3 81 - 5 12 Million/uL    Hemoglobin 12 7 11 5 - 15 4 g/dL    Hematocrit 37 4 34 8 - 46 1 %    MCV 85 82 - 98 fL    MCH 28 9 26 8 - 34 3 pg    MCHC 34 0 31 4 - 37 4 g/dL    RDW 12 9 11 6 - 15 1 %    MPV 8 5 (L) 8 9 - 12 7 fL    Platelets 139 268 - 555 Thousands/uL    nRBC 0 /100 WBCs    Neutrophils Relative 58 43 - 75 %    Immat GRANS % 1 0 - 2 %    Lymphocytes Relative 31 14 - 44 %    Monocytes Relative 7 4 - 12 %    Eosinophils Relative 3 0 - 6 %    Basophils Relative 0 0 - 1 %    Neutrophils Absolute 4 30 1 85 - 7 62 Thousands/µL    Immature Grans Absolute 0 05 0 00 - 0 20 Thousand/uL    Lymphocytes Absolute 2 25 0 60 - 4 47 Thousands/µL    Monocytes Absolute 0 52 0 17 - 1 22 Thousand/µL    Eosinophils Absolute 0 21 0 00 - 0 61 Thousand/µL    Basophils Absolute 0 02 0 00 - 0 10 Thousands/µL   Comprehensive metabolic panel    Collection Time: 03/17/21  9:28 AM   Result Value Ref Range    Sodium 138 136 - 145 mmol/L    Potassium 3 7 3 5 - 5 3 mmol/L    Chloride 103 100 - 108 mmol/L    CO2 30 21 - 32 mmol/L    ANION GAP 5 4 - 13 mmol/L    BUN 9 5 - 25 mg/dL    Creatinine 0 67 0 60 - 1 30 mg/dL    Glucose 112 65 - 140 mg/dL    Calcium 9 3 8 3 - 10 1 mg/dL    AST 40 5 - 45 U/L    ALT 38 12 - 78 U/L    Alkaline Phosphatase 84 46 - 116 U/L    Total Protein 7 5 6 4 - 8 2 g/dL    Albumin 3 8 3 5 - 5 0 g/dL    Total Bilirubin 0 31 0 20 - 1 00 mg/dL    eGFR 120 ml/min/1 73sq m   Lactic Acid    Collection Time: 03/17/21  9:28 AM   Result Value Ref Range    LACTIC ACID 1 2 0 5 - 2 0 mmol/L   Protime-INR    Collection Time: 03/17/21  9:28 AM   Result Value Ref Range Protime 13 1 11 6 - 14 5 seconds    INR 0 98 0 84 - 1 19   APTT    Collection Time: 03/17/21  9:28 AM   Result Value Ref Range    PTT 28 23 - 37 seconds   Procalcitonin with AM Reflex    Collection Time: 03/17/21  9:28 AM   Result Value Ref Range    Procalcitonin <0 05 <=0 25 ng/ml   Blood culture #2    Collection Time: 03/17/21  9:28 AM    Specimen: Arm, Right; Blood   Result Value Ref Range    Blood Culture Received in Microbiology Lab  Culture in Progress      Troponin I    Collection Time: 03/17/21  9:28 AM   Result Value Ref Range    Troponin I 2 22 (H) <=0 04 ng/mL   Troponin I    Collection Time: 03/17/21  2:26 PM   Result Value Ref Range    Troponin I 1 46 (H) <=0 04 ng/mL   Troponin I    Collection Time: 03/17/21  5:57 PM   Result Value Ref Range    Troponin I 1 19 (H) <=0 04 ng/mL   CBC and differential    Collection Time: 03/18/21  6:55 AM   Result Value Ref Range    WBC 5 91 4 31 - 10 16 Thousand/uL    RBC 4 55 3 81 - 5 12 Million/uL    Hemoglobin 13 0 11 5 - 15 4 g/dL    Hematocrit 39 1 34 8 - 46 1 %    MCV 86 82 - 98 fL    MCH 28 6 26 8 - 34 3 pg    MCHC 33 2 31 4 - 37 4 g/dL    RDW 13 1 11 6 - 15 1 %    MPV 8 2 (L) 8 9 - 12 7 fL    Platelets 077 880 - 453 Thousands/uL    nRBC 0 /100 WBCs    Neutrophils Relative 43 43 - 75 %    Immat GRANS % 1 0 - 2 %    Lymphocytes Relative 40 14 - 44 %    Monocytes Relative 10 4 - 12 %    Eosinophils Relative 6 0 - 6 %    Basophils Relative 0 0 - 1 %    Neutrophils Absolute 2 59 1 85 - 7 62 Thousands/µL    Immature Grans Absolute 0 03 0 00 - 0 20 Thousand/uL    Lymphocytes Absolute 2 34 0 60 - 4 47 Thousands/µL    Monocytes Absolute 0 56 0 17 - 1 22 Thousand/µL    Eosinophils Absolute 0 37 0 00 - 0 61 Thousand/µL    Basophils Absolute 0 02 0 00 - 0 10 Thousands/µL   Comprehensive metabolic panel    Collection Time: 03/18/21  6:55 AM   Result Value Ref Range    Sodium 137 136 - 145 mmol/L    Potassium 5 1 3 5 - 5 3 mmol/L    Chloride 103 100 - 108 mmol/L    CO2 21 21 - 32 mmol/L    ANION GAP 13 4 - 13 mmol/L    BUN 17 5 - 25 mg/dL    Creatinine 1 01 0 60 - 1 30 mg/dL    Glucose 90 65 - 140 mg/dL    Calcium 9 3 8 3 - 10 1 mg/dL    Corrected Calcium 9 8 8 3 - 10 1 mg/dL    AST 76 (H) 5 - 45 U/L    ALT 41 12 - 78 U/L    Alkaline Phosphatase 79 46 - 116 U/L    Total Protein 7 4 6 4 - 8 2 g/dL    Albumin 3 4 (L) 3 5 - 5 0 g/dL    Total Bilirubin 0 51 0 20 - 1 00 mg/dL    eGFR 76 ml/min/1 73sq m     [facility] [address] [employer]  [phone]     Transthoracic Echocardiogram  2D, M-mode, Doppler, and Color Doppler     Study date:  17-Mar-2021     Patient: Ashlee Vidal  MR number: [id number]  Account number: [de-identified]  : 1990  Age: 31 years  Gender: Female  Status: Inpatient  Location: Bedside  Height: 65 in  Weight: 142 8 lb  BP: 117/ 81 mmHg     Indications: Assess left ventricular function      Diagnoses: I36 9 - Nonrheumatic tricuspid valve disorder, unspecified     Sonographer:  CANDY Moody  Primary Physician:  Anjelica Gunter PA-C  Referring Physician:  Shalini Abbott MD  Group:  [facility]  Interpreting Physician:  Essie Troy MD     SUMMARY     LEFT VENTRICLE:  Systolic function was at the lower limits of normal  Ejection fraction was estimated in the range of 45 % to 50 %  There was dyssynergy of the septum      VENTRICULAR SEPTUM:  There was dyssynergic motion  These changes are consistent with a post-thoracotomy state      MITRAL VALVE:  There was trace regurgitation      TRICUSPID VALVE:  There was trace regurgitation  Pulmonary artery systolic pressure was within the normal range      HISTORY: PRIOR HISTORY: Elevated troponin level, DVT, Tricuspid repair s/p endocarditis, IV drug use     PROCEDURE: The procedure was performed at the bedside  This was a routine study  The transthoracic approach was used   The study included complete 2D imaging, M-mode, complete spectral Doppler, and color Doppler  The heart rate was 70 bpm,  at the start of the study  Images were obtained from the parasternal, apical, subcostal, and suprasternal notch acoustic windows  Image quality was adequate      LEFT VENTRICLE: Size was normal  Systolic function was at the lower limits of normal  Ejection fraction was estimated in the range of 45 % to 50 %  There was dyssynergy of the septum  DOPPLER: Left ventricular diastolic function parameters  were normal for the patient's age      VENTRICULAR SEPTUM: There was dyssynergic motion  These changes are consistent with a post-thoracotomy state      RIGHT VENTRICLE: The size was normal  Systolic function was normal      LEFT ATRIUM: Size was normal      RIGHT ATRIUM: Size was normal      MITRAL VALVE: Valve structure was normal  There was normal leaflet separation  DOPPLER: The transmitral velocity was within the normal range  There was no evidence for stenosis  There was trace regurgitation      AORTIC VALVE: The valve was trileaflet  Leaflets exhibited normal thickness and normal cuspal separation  DOPPLER: Transaortic velocity was within the normal range  There was no evidence for stenosis  There was no regurgitation      TRICUSPID VALVE: Prior repair procedures: surgical repair DOPPLER: There was trace regurgitation  Pulmonary artery systolic pressure was within the normal range      PULMONIC VALVE: Leaflets exhibited normal thickness, no calcification, and normal cuspal separation  DOPPLER: The transpulmonic velocity was within the normal range  There was mild regurgitation      PERICARDIUM: There was no pericardial effusion      AORTA: The root exhibited normal size  Imaging: I have personally reviewed pertinent reports  tele: NSR/SVT      Counseling / Coordination of Care  Total time spent today 20 minutes  Greater than 50% of total time was spent with the patient and / or family counseling and / or coordination of care

## 2021-03-18 NOTE — PROGRESS NOTES
Progress Note - Infectious Disease   Enedina Bar 31 y o  female MRN: [id number]  Unit/Bed#: S -01 Encounter: [id number]      Impression/Plan:  1  Right forearm and right hand cellulitis  S/p IV injection of methamphetamine 3/16 at both sites  US negative for abscess and retained foreign body  Fortunately patient is systemically well and nontoxic appearing  3/16/21 blood cultures negative to date  Continue IV vancomycin  Pharmacy on consult for vancomycin trough management dosing  Monitor temperature/WBC  Follow-up on pending blood cultures  If blood cultures remain negative, can transition to PO antibiotic likely within the next 24 hours      2   Left clavicular cellulitis, with associated phlegmon, secondary to IV drug injection 2 weeks ago   On antibiotic, cellulitic changes improved   No evidence of abscess clinically or radiologically   Patient is clinically improved on IV antibiotics   She remains systemically well and hemodynamically stable   Likely pathogens are skin pathogens   3/10, 3/11, 3/13, 3/16 Blood cultures x 9 all no growth thus far  Continue IV vancomycin  Serial neck exams  Monitor temperature/WBC  Follow-up on pending blood cultures      3  Left clavicular foreign body/retained needle fragment from IVDU     Continue antibiotic plan as in above  No surgical intervention planned     4  History of MSSA bacteremia and TV endocarditis, status post TV repair 9/20   Patient completed 6 weeks course of IV antibiotic  Vinny Connell blood cultures have no growth thus far  No further antibiotic needed for this      5  Sacroiliac joint septic arthritis   Patient completed prolonged IV antibiotic courses in above   She has been noncompliant with outpatient p o  Antibiotic   No sacroiliac pain noted today  No further antibiotic needed for this      6  Active IVDU  Marek Ekaterina is not a candidate for outpatient IV antibiotic      7  Unprotected sexual exposure   Patient left AMA on 3/16/21 from [facility] with a stranger she met on Facebook who came to pick her up from the hospital who she injected IV methamphetamine with and engaged in unprotected  Continue HIV post exposure prophylaxis regimen with Truvada and Raltegravir  Check urine gonorrhea and chlamydia  Check RPR     Detailed review of medical records including all notes, imaging and labs  Above impression and plan discussed in detail with patient, RN, and primary care team     Antibiotics:  Vancomycin D2    Subjective:  Patient fell last night she reports secondary to telemetry wiring  She is now resting soundly in bed  She is arousable but very groggy  She appears to be tolerating IV antibiotics  There has been no reported dizziness or hearing loss  ROS: Patient has no fever, chills, sweats overnight; no nausea, vomiting, diarrhea; no cough, shortness of breath; no pain complaints at present  No new symptoms  Objective:  Vitals:  Temp:  [97 7 °F (36 5 °C)-98 8 °F (37 1 °C)] 98 8 °F (37 1 °C)  HR:  [] 80  Resp:  [16-20] 16  BP: ()/(56-81) 90/56  SpO2:  [97 %-100 %] 100 %  Temp (24hrs), Av 2 °F (36 8 °C), Min:97 7 °F (36 5 °C), Max:98 8 °F (37 1 °C)  Current: Temperature: 98 8 °F (37 1 °C)    General Appearance:  Very groggy, sleeping soundly in bed, briefly arousable, no acute distress  Throat: Oropharynx moist without lesions  Neck: Decreased erythema and induration around retained needle on left side of neck   Lungs:   Clear to auscultation bilaterally; no wheezes, rhonchi or rales; respirations unlabored   Heart:  RRR; S1-S2 heard, no murmur   Abdomen:   Soft, non-tender, non-distended, positive bowel sounds  Extremities: Right forearm and hand swelling and erythema significantly faded   + right forearm induration mildly tender in sleepy state, arm IV site nontender   Skin: No rashes      Labs, Imaging, & Other studies:   All pertinent labs and imaging studies were personally reviewed  Results from last 7 days Lab Units 03/18/21  0655 03/17/21  0928 03/16/21  0611   WBC Thousand/uL 5 91 7 35 4 77   HEMOGLOBIN g/dL 13 0 12 7 11 3*   PLATELETS Thousands/uL 244 273 253     Results from last 7 days   Lab Units 03/18/21  0655 03/17/21  0928  03/14/21  0640   POTASSIUM mmol/L 5 1 3 7   < > 3 4*   CHLORIDE mmol/L 103 103   < > 112*   CO2 mmol/L 21 30   < > 28   BUN mg/dL 17 9   < > 8   CREATININE mg/dL 1 01 0 67   < > 0 60   EGFR ml/min/1 73sq m 76 120   < > 124   CALCIUM mg/dL 9 3 9 3   < > 8 4   AST U/L 76* 40  --  30   ALT U/L 41 38  --  38   ALK PHOS U/L 79 84  --  95    < > = values in this interval not displayed  Results from last 7 days   Lab Units 03/17/21  0928 03/14/21  0640 03/13/21  2045   PROCALCITONIN ng/ml <0 05 <0 05 <0 05     Results from last 7 days   Lab Units 03/17/21  0928 03/17/21  0920 03/13/21 2052 03/13/21 2046   BLOOD CULTURE  Received in Microbiology Lab  Culture in Progress  Received in Microbiology Lab  Culture in Progress  No Growth After 4 Days  No Growth After 4 Days

## 2021-03-18 NOTE — PROGRESS NOTES
[facility]  Progress Note - Sharif Saavedra 1990, 31 y o  female MRN: [id number]  Unit/Bed#: S -01 Encounter: [id number]  Primary Care Provider: Trista Quinn PA-C   Date and time admitted to hospital: 3/17/2021  8:17 AM    * Right forearm cellulitis  Assessment & Plan  · With associated fluctuance and IV drug use, no abscess noted  Blood cultures negative at 24 hours, 4 days from 3/13  · Continue Vancomycin, likely transition to PO antibiotics in the next coming day  · Appreciate ID and Pharmacy input   · Trend cultures  · Follow fever curve, CBC    Retained foreign body  Assessment & Plan  · Retained needle in left neck, early phlegmon noted on CT scan  · Surgical consult noted, no removal planned at this time   · ? Phlegmon   · Need serial close exams  · Monitor for any signs of hot potato voice or airway compromise          DVT of axillary vein, acute left (HCC)  Assessment & Plan  · Patient noncompliant with Eliquis, will utilize therapeutic Lovenox while admitted  · Transition to PO anticoagulation at discharge    Intravenous drug abuse ([address] )  Assessment & Plan  · Heroin / Meth / Cocaine noted in urine  Patient continues to feel ill   · Continue Clonidine   2mg BID  · Seroquel scheduled at bedtime   · Restart Klonopin 0 5 mg PO BID - patient takes this scheduled as outpatient   · Ativan 0 5mg IV Q4 PRN     Exposure to STD  Assessment & Plan  · Patient had high risk vaginal intercourse, possibly in exchange for drugs   · Received HIV exposure meds in ED  · ID consult appreciated   · Continue Isentress and Truvada for PREP for 28 days   · Check HIV 6 week, 3 month, 6 months   · Follow up RPR, GC/Chlamydia   · Monitor CBC, CMP    Elevated troponin  Assessment & Plan  · Likely non MI troponin elevation but she has a history of TV repair due to endocarditis  · Stop telemetry   · Start baby aspirin  · Consult Cardiology appreciated, no further management from their end VTE Pharmacologic Prophylaxis:   Pharmacologic: Enoxaparin (Lovenox)  Mechanical VTE Prophylaxis in Place: No    Patient Centered Rounds: I have performed bedside rounds with nursing staff today  Discussions with Specialists or Other Care Team Provider: Discussed with RN, CLIF    Education and Discussions with Family / Patient: Discussed with patient, declined update     Time Spent for Care: 30 minutes  More than 50% of total time spent on counseling and coordination of care as described above  Current Length of Stay: 1 day(s)    Current Patient Status: Inpatient   Certification Statement: The patient will continue to require additional inpatient hospital stay due to on going IV antibiotics, monitoring cultures, safe discharge plan     Discharge Plan: Pending at this time, need negative blood cultures and antibiotic plan     Code Status: Level 1 - Full Code      Subjective:   Patient reports that she still feels ill  Asking for multiple changes to her medications - wants Ativan IV, PO Oxycodone  Discussed that we can add her home Klonopin  Will not add PO Narcotics  Will add Ibuprofen  She reports nausea and is asking for IV Zofran  States that she was having some hallucinations last night that she states she sometimes has when coming off Meth  She reports that her fall last night resulted from being tangled up in her IV pole running to get her phone because it was "hacked"  She denies headache, change in vision, or dizziness  Denies numbness or tingling in upper or lower extremities  Reporting pain in left arm  Objective:     Vitals:   Temp (24hrs), Av 3 °F (36 8 °C), Min:97 7 °F (36 5 °C), Max:98 8 °F (37 1 °C)    Temp:  [97 7 °F (36 5 °C)-98 8 °F (37 1 °C)] 98 8 °F (37 1 °C)  HR:  [] 80  Resp:  [16-18] 16  BP: ()/(56-67) 90/56  SpO2:  [97 %-100 %] 100 %  Body mass index is 23 85 kg/m²  Input and Output Summary (last 24 hours):        Intake/Output Summary (Last 24 hours) at 3/18/2021 1519  Last data filed at 3/18/2021 0402  Gross per 24 hour   Intake 927 08 ml   Output 500 ml   Net 427 08 ml       Physical Exam:     Physical Exam  Constitutional:       General: She is not in acute distress  Appearance: She is well-developed  She is not ill-appearing or diaphoretic  HENT:      Head: Normocephalic and atraumatic  Mouth/Throat:      Mouth: Mucous membranes are moist    Eyes:      General: No scleral icterus  Conjunctiva/sclera: Conjunctivae normal       Pupils: Pupils are equal, round, and reactive to light  Neck:      Musculoskeletal: Neck supple  Cardiovascular:      Rate and Rhythm: Normal rate and regular rhythm  Heart sounds: Normal heart sounds, S1 normal and S2 normal  No murmur  No S3 or S4 sounds  Pulmonary:      Effort: Pulmonary effort is normal  No accessory muscle usage or respiratory distress  Breath sounds: Normal breath sounds  No stridor  No decreased breath sounds, wheezing, rhonchi or rales  Chest:      Chest wall: No tenderness  Abdominal:      General: Bowel sounds are normal  There is no distension  Palpations: Abdomen is soft  There is no mass  Tenderness: There is no abdominal tenderness  There is no guarding or rebound  Musculoskeletal:      Right lower leg: No edema  Left lower leg: No edema  Skin:     General: Skin is warm and dry  Comments: Multiple tattoos noted    Neurological:      General: No focal deficit present  Mental Status: She is alert and oriented to person, place, and time  Motor: No weakness, tremor or seizure activity        Comments: Patient moving all 4 limbs equally          Additional Data:     Labs:    Results from last 7 days   Lab Units 03/18/21  0655   WBC Thousand/uL 5 91   HEMOGLOBIN g/dL 13 0   HEMATOCRIT % 39 1   PLATELETS Thousands/uL 244   NEUTROS PCT % 43   LYMPHS PCT % 40   MONOS PCT % 10   EOS PCT % 6     Results from last 7 days   Lab Units 03/18/21  0655   POTASSIUM mmol/L 5 1   CHLORIDE mmol/L 103   CO2 mmol/L 21   BUN mg/dL 17   CREATININE mg/dL 1 01   CALCIUM mg/dL 9 3   ALK PHOS U/L 79   ALT U/L 41   AST U/L 76*     Results from last 7 days   Lab Units 03/17/21  0928   INR  0 98       * I Have Reviewed All Lab Data Listed Above  * Additional Pertinent Lab Tests Reviewed: [address] Admission Reviewed    Imaging:    Imaging Reports Reviewed Today Include: US Soft Tissue - negative for abscess  Imaging Personally Reviewed by Myself Includes:  None     Recent Cultures (last 7 days):     Results from last 7 days   Lab Units 03/17/21  0928 03/17/21  0920 03/13/21 2052 03/13/21 2046   BLOOD CULTURE  No Growth at 24 hrs  No Growth at 24 hrs  No Growth After 4 Days  No Growth After 4 Days         Last 24 Hours Medication List:   Current Facility-Administered Medications   Medication Dose Route Frequency Provider Last Rate    aspirin  81 mg Oral Daily Melissa Zavala MD      clonazePAM  0 5 mg Oral BID Young Hampton PA-C      cloNIDine  0 2 mg Oral Q12H [address] Deonna Mann MD      emtricitabine-tenofovir (TRUVADA 200-300) combo dose   Oral Daily Deonna Mann MD      enoxaparin  1 mg/kg Subcutaneous Q12H [address] Deonna Mann MD      ibuprofen  600 mg Oral Q6H PRN Anam Pastrana PA-C      LORazepam  0 5 mg Intravenous Q4H PRN Marino Magana MD      metoprolol  5 mg Intravenous Q6H PRN Marino Magana MD      nicotine  1 patch Transdermal Daily Marino Magana MD      ondansetron  4 mg Intravenous Q6H PRN Anam Pastrana PA-C      QUEtiapine  25 mg Oral HS Young Hampton PA-C      raltegravir  400 mg Oral BID Malvin Johnson PA-C      sodium chloride (PF)  3 mL Intravenous Q1H PRN Deonna Mann MD      sodium chloride  125 mL/hr Intravenous Continuous Marino Magana  mL/hr (03/18/21 1425)    vancomycin  20 mg/kg Intravenous Q12H Deonna Mann MD 1,250 mg (03/18/21 1022)        Today, Patient Was Seen By: Anam Pastrana, MILLIE    ** Please Note: Dictation voice to text software may have been used in the creation of this document   **

## 2021-03-18 NOTE — PROGRESS NOTES
Progress Note - General Surgery   Nasreen Jonasite 31 y o  female MRN: [id number]  Unit/Bed#: S -01 Encounter: [id number]    Assessment:  32 yo F PMH IVDU, hx of DVT axillary V (8/2020, noncompliant with Eliquis), hepatitis, endocarditis s/p TVRepair (9/2020), here with R forearm cellulitis, and bilateral neck cellulitis/phlegmon with retained FB in L neck  R forearm cellulitis, no abscess on ultrasound  Patient had rapid response called overnight for a fall with head strike and questionable loss of consciousness but is adamantly refusing CT scan  All vital signs stable, Neuro exam is stable, white cell count 5 91    Plan:  Recommend no surgical intervention at this time  No forearm abscess on US  Continue IV abx, per ID recommendation  Rest of care per primary    Subjective/Objective     Subjective:  Rapid response called overnight for a fall, patient reportedly tripped over her IV pole while walking in the room and fell hitting her head on the ground with positive loss of consciousness  CT scan of her head was advised but she is adamantly refusing at this time  She reports stable pain in her right forearm in bilateral neck  No new complaints but states I am going to be miserable over the next few days because I am coming down from a long meth trip     Objective:    Blood pressure 90/56, pulse 80, temperature 98 8 °F (37 1 °C), temperature source Oral, resp  rate 16, weight 65 kg (143 lb 4 8 oz), last menstrual period 03/15/2021, SpO2 100 %, not currently breastfeeding  ,Body mass index is 23 85 kg/m²        Intake/Output Summary (Last 24 hours) at 3/18/2021 0836  Last data filed at 3/18/2021 0402  Gross per 24 hour   Intake 1177 08 ml   Output 500 ml   Net 677 08 ml       Invasive Devices     Peripheral Intravenous Line            Peripheral IV 03/18/21 Distal;Right;Upper;Ventral (anterior) Arm less than 1 day                Physical Exam:  General: AAO x 3, NAD  HEENT: Normocephalic, atraumatic, conjunctiva normal, EOMI, PERRLA  Neck: No JVD, No LAD, mild erythema of bilateral neck, is improving with IV antibiotics  Cardio: RRR  Resp: NWB on RA  Abd: Soft, nontender, nondistended, No guarding, no rebound  Neuro: Sensation and motor intact x 4 limbs  Extremities: WWP, right forearm cellulitis with no abscess  Skin: Warm and dry    Lab, Imaging and other studies:I have personally reviewed pertinent lab results      VTE Pharmacologic Prophylaxis: Enoxaparin (Lovenox)  VTE Mechanical Prophylaxis: sequential compression device

## 2021-03-18 NOTE — RAPID RESPONSE
Rapid Response Note  Steve Fajardo 31 y o  female MRN: [id number]  Unit/Bed#: S -01 Encounter: [id number]    Rapid Response Notification(s):   Response called date/time:  3/18/2021 1:32 AM  Response team arrival date/time:  3/18/2021 1:34 AM  Response end date/time:  3/18/2021 1:38 AM  Rapid response location:  [facility]  Primary reason for rapid response call: Other (comment) (fall )    Rapid Response Intervention(s):   Airway:  None  Breathing:  None  Circulation:  None  Fluids administered:  None  Medications administered:  None       Background/Situation:   Steve Fajardo is a 31 y o  female who is admitted for cellulitis of the right forearm with retained foreign body secondary to IV drug abuse  Patient had unwitnessed fall earlier tonight  Patient was alert and oriented x4 and stated that she fell secondary to telemetry wiring  She denies any headaches, changes in vision  She does state that she is having pain on the right side of her head currently  She does state that she had her head but has no obvious signs of trauma  Patient is on any anticoagulant  Review of Systems   Constitutional: Negative for chills, fatigue, fever and unexpected weight change  Respiratory: Negative for cough, chest tightness and shortness of breath  Cardiovascular: Negative for chest pain and palpitations  Gastrointestinal: Negative for abdominal pain, diarrhea, nausea and vomiting  Genitourinary: Negative for decreased urine volume, dysuria, frequency and urgency  Musculoskeletal: Positive for arthralgias and myalgias  Negative for back pain, neck pain and neck stiffness  Neurological: Positive for weakness  Negative for dizziness, syncope, light-headedness and headaches  All other systems reviewed and are negative        Objective:   Vitals:    03/17/21 2037 03/17/21 2220 03/18/21 0033 03/18/21 0100   BP: 102/60  110/67 108/58   BP Location:   Left arm Left arm   Pulse:  (!) 109 Resp:  18     Temp:  97 7 °F (36 5 °C)     TempSrc:  Oral     SpO2:  97%     Weight:         Physical Exam  Constitutional:       General: She is not in acute distress  HENT:      Head: Normocephalic and atraumatic  Mouth/Throat:      Mouth: Mucous membranes are moist       Pharynx: Oropharynx is clear  No oropharyngeal exudate  Eyes:      General:         Right eye: No discharge  Left eye: No discharge  Conjunctiva/sclera: Conjunctivae normal       Pupils: Pupils are equal, round, and reactive to light  Neck:      Musculoskeletal: Neck supple  No muscular tenderness  Cardiovascular:      Rate and Rhythm: Normal rate and regular rhythm  Pulses: Normal pulses  Heart sounds: Normal heart sounds  No murmur  Pulmonary:      Effort: Pulmonary effort is normal  No respiratory distress  Breath sounds: Normal breath sounds  No wheezing or rales  Abdominal:      General: Abdomen is flat  There is no distension  Palpations: Abdomen is soft  Tenderness: There is no abdominal tenderness  Musculoskeletal: Normal range of motion  General: No swelling, tenderness, deformity or signs of injury  Right lower leg: No edema  Left lower leg: No edema  Comments: No midline spine tenderness  Able to move neck freely  Skin:     General: Skin is warm and dry  Capillary Refill: Capillary refill takes less than 2 seconds  Coloration: Skin is not jaundiced  Findings: No bruising, erythema or lesion  Comments: Track marks throughout  Large known visible abscess on the right forearm at anticubital fossa  Neurological:      General: No focal deficit present  Mental Status: She is alert and oriented to person, place, and time  Cranial Nerves: No cranial nerve deficit  Comments: Neuro exam grossly intact  Neg pronator drift  Patient responding appropriately  No slurring  Moving all extremities  PERRL      Psychiatric: Mood and Affect: Mood normal          Assessment:   · Fall secondary to mechanical obstruction  She has no midline tenderness  Patient's neurologic exam is otherwise intact  She is not complaining of any headache, change in vision, numbness or tingling  Plan:   · Be no need for CT scan at this time is patient's neuro exam is unremarkable  Ideally would like to bed alarm patient, however according to nursing staff patient will not tolerate this  Instructed patient to alert nursing staff when she is to transfer out of bed to avoid falls  Rapid Response Outcome:   Condition:  In stable condition  Progression:  Unchanged  Transfer:  Remain on floor  Primary service notified of transfer: Yes    Handoff report: in person    Code Status: Level 1 (Full Code)      Family notified of transfer: no  Family member contacted: no      Portions of the record may have been created with voice recognition software  Occasional wrong word or "sound a like" substitutions may have occurred due to the inherent limitations of voice recognition software  Read the chart carefully and recognize, using context, where substitutions have occurred      Les Rush PA-C

## 2021-03-19 DIAGNOSIS — Z20.2 EXPOSURE TO STD: Primary | ICD-10-CM

## 2021-03-19 LAB
ANION GAP SERPL CALCULATED.3IONS-SCNC: 7 MMOL/L (ref 4–13)
BACTERIA BLD CULT: NORMAL
BACTERIA BLD CULT: NORMAL
BASOPHILS # BLD AUTO: 0.03 THOUSANDS/ΜL (ref 0–0.1)
BASOPHILS NFR BLD AUTO: 1 % (ref 0–1)
BUN SERPL-MCNC: 18 MG/DL (ref 5–25)
C TRACH DNA SPEC QL NAA+PROBE: NEGATIVE
CALCIUM SERPL-MCNC: 8.3 MG/DL (ref 8.3–10.1)
CHLORIDE SERPL-SCNC: 107 MMOL/L (ref 100–108)
CO2 SERPL-SCNC: 25 MMOL/L (ref 21–32)
CREAT SERPL-MCNC: 1.28 MG/DL (ref 0.6–1.3)
EOSINOPHIL # BLD AUTO: 0.39 THOUSAND/ΜL (ref 0–0.61)
EOSINOPHIL NFR BLD AUTO: 8 % (ref 0–6)
ERYTHROCYTE [DISTWIDTH] IN BLOOD BY AUTOMATED COUNT: 13.2 % (ref 11.6–15.1)
GFR SERPL CREATININE-BSD FRML MDRD: 57 ML/MIN/1.73SQ M
GLUCOSE SERPL-MCNC: 81 MG/DL (ref 65–140)
HCT VFR BLD AUTO: 36.2 % (ref 34.8–46.1)
HGB BLD-MCNC: 11.9 G/DL (ref 11.5–15.4)
IMM GRANULOCYTES # BLD AUTO: 0.02 THOUSAND/UL (ref 0–0.2)
IMM GRANULOCYTES NFR BLD AUTO: 0 % (ref 0–2)
LYMPHOCYTES # BLD AUTO: 2.02 THOUSANDS/ΜL (ref 0.6–4.47)
LYMPHOCYTES NFR BLD AUTO: 43 % (ref 14–44)
MCH RBC QN AUTO: 28.9 PG (ref 26.8–34.3)
MCHC RBC AUTO-ENTMCNC: 32.9 G/DL (ref 31.4–37.4)
MCV RBC AUTO: 88 FL (ref 82–98)
MONOCYTES # BLD AUTO: 0.55 THOUSAND/ΜL (ref 0.17–1.22)
MONOCYTES NFR BLD AUTO: 12 % (ref 4–12)
N GONORRHOEA DNA SPEC QL NAA+PROBE: NEGATIVE
NEUTROPHILS # BLD AUTO: 1.71 THOUSANDS/ΜL (ref 1.85–7.62)
NEUTS SEG NFR BLD AUTO: 36 % (ref 43–75)
NRBC BLD AUTO-RTO: 0 /100 WBCS
PLATELET # BLD AUTO: 222 THOUSANDS/UL (ref 149–390)
PMV BLD AUTO: 8.3 FL (ref 8.9–12.7)
POTASSIUM SERPL-SCNC: 4.4 MMOL/L (ref 3.5–5.3)
RBC # BLD AUTO: 4.12 MILLION/UL (ref 3.81–5.12)
RPR SER QL: NORMAL
SODIUM SERPL-SCNC: 139 MMOL/L (ref 136–145)
VANCOMYCIN TROUGH SERPL-MCNC: 26.5 UG/ML (ref 10–20)
WBC # BLD AUTO: 4.72 THOUSAND/UL (ref 4.31–10.16)

## 2021-03-19 PROCEDURE — 99232 SBSQ HOSP IP/OBS MODERATE 35: CPT | Performed by: PHYSICIAN ASSISTANT

## 2021-03-19 PROCEDURE — 99233 SBSQ HOSP IP/OBS HIGH 50: CPT | Performed by: INTERNAL MEDICINE

## 2021-03-19 PROCEDURE — 85025 COMPLETE CBC W/AUTO DIFF WBC: CPT | Performed by: PHYSICIAN ASSISTANT

## 2021-03-19 PROCEDURE — 80048 BASIC METABOLIC PNL TOTAL CA: CPT | Performed by: PHYSICIAN ASSISTANT

## 2021-03-19 PROCEDURE — 99232 SBSQ HOSP IP/OBS MODERATE 35: CPT | Performed by: SURGERY

## 2021-03-19 RX ORDER — LORAZEPAM 2 MG/ML
1 INJECTION INTRAMUSCULAR ONCE
Status: COMPLETED | OUTPATIENT
Start: 2021-03-19 | End: 2021-03-19

## 2021-03-19 RX ORDER — ACETAMINOPHEN 325 MG/1
650 TABLET ORAL EVERY 6 HOURS PRN
Status: DISCONTINUED | OUTPATIENT
Start: 2021-03-19 | End: 2021-03-21 | Stop reason: HOSPADM

## 2021-03-19 RX ORDER — CEPHALEXIN 500 MG/1
500 CAPSULE ORAL EVERY 6 HOURS SCHEDULED
Status: DISCONTINUED | OUTPATIENT
Start: 2021-03-20 | End: 2021-03-21 | Stop reason: HOSPADM

## 2021-03-19 RX ORDER — DOXYCYCLINE HYCLATE 100 MG/1
100 CAPSULE ORAL EVERY 12 HOURS SCHEDULED
Status: DISCONTINUED | OUTPATIENT
Start: 2021-03-20 | End: 2021-03-21 | Stop reason: HOSPADM

## 2021-03-19 RX ADMIN — QUETIAPINE FUMARATE 25 MG: 25 TABLET ORAL at 22:41

## 2021-03-19 RX ADMIN — LORAZEPAM 1 MG: 2 INJECTION INTRAMUSCULAR; INTRAVENOUS at 01:11

## 2021-03-19 RX ADMIN — LORAZEPAM 0.5 MG: 2 INJECTION INTRAMUSCULAR; INTRAVENOUS at 21:22

## 2021-03-19 RX ADMIN — LORAZEPAM 0.5 MG: 2 INJECTION INTRAMUSCULAR; INTRAVENOUS at 06:24

## 2021-03-19 RX ADMIN — CLONAZEPAM 0.5 MG: 0.5 TABLET ORAL at 17:06

## 2021-03-19 RX ADMIN — LORAZEPAM 0.5 MG: 2 INJECTION INTRAMUSCULAR; INTRAVENOUS at 13:09

## 2021-03-19 RX ADMIN — CLONIDINE HYDROCHLORIDE 0.2 MG: 0.1 TABLET ORAL at 22:40

## 2021-03-19 RX ADMIN — ACETAMINOPHEN 650 MG: 325 TABLET, FILM COATED ORAL at 23:58

## 2021-03-19 RX ADMIN — ASPIRIN 81 MG: 81 TABLET, COATED ORAL at 09:11

## 2021-03-19 RX ADMIN — SODIUM CHLORIDE 125 ML/HR: 0.9 INJECTION, SOLUTION INTRAVENOUS at 01:10

## 2021-03-19 RX ADMIN — CEPHALEXIN 500 MG: 500 CAPSULE ORAL at 23:35

## 2021-03-19 RX ADMIN — VANCOMYCIN HYDROCHLORIDE 750 MG: 750 INJECTION, SOLUTION INTRAVENOUS at 17:06

## 2021-03-19 RX ADMIN — ENOXAPARIN SODIUM 70 MG: 80 INJECTION SUBCUTANEOUS at 09:11

## 2021-03-19 RX ADMIN — LORAZEPAM 0.5 MG: 2 INJECTION INTRAMUSCULAR; INTRAVENOUS at 17:20

## 2021-03-19 RX ADMIN — ONDANSETRON 4 MG: 2 INJECTION INTRAMUSCULAR; INTRAVENOUS at 17:06

## 2021-03-19 RX ADMIN — IBUPROFEN 600 MG: 600 TABLET, FILM COATED ORAL at 06:24

## 2021-03-19 RX ADMIN — ENOXAPARIN SODIUM 70 MG: 80 INJECTION SUBCUTANEOUS at 22:39

## 2021-03-19 RX ADMIN — VANCOMYCIN HYDROCHLORIDE 750 MG: 750 INJECTION, SOLUTION INTRAVENOUS at 06:24

## 2021-03-19 RX ADMIN — EMTRICITABINE: 200 CAPSULE ORAL at 09:13

## 2021-03-19 RX ADMIN — CLONAZEPAM 0.5 MG: 0.5 TABLET ORAL at 09:11

## 2021-03-19 RX ADMIN — SODIUM CHLORIDE 125 ML/HR: 0.9 INJECTION, SOLUTION INTRAVENOUS at 17:25

## 2021-03-19 RX ADMIN — RALTEGRAVIR 400 MG: 400 TABLET, FILM COATED ORAL at 09:12

## 2021-03-19 RX ADMIN — RALTEGRAVIR 400 MG: 400 TABLET, FILM COATED ORAL at 22:40

## 2021-03-19 NOTE — CONSULTS
Vancomycin IV Pharmacy-to-Dose Consultation  Romana Harry is a 31 y o  female who was receiving Vancomycin IV with management by the Pharmacy Consult service for treatment of skin-soft tissue infection    The patients Vancomycin therapy has been completed / discontinued  Thank you for allowing us to take part in this patient's care  Pharmacy will sign-off now; please call or re-consult if there are any questions       Arabella Gurrola, PharmD  Pharmacist

## 2021-03-19 NOTE — ASSESSMENT & PLAN NOTE
· With associated fluctuance and IV drug use, no abscess noted   Blood cultures negative at 48 hours, 5 days from 3/13  · Stable for d/c on PO abx-- Keflex/Doxy thru 3/27 pending BMP   · Needs OP ID f/u in 2 weeks

## 2021-03-19 NOTE — PROGRESS NOTES
Pt complaining that she needs the PRN Ativan dose increased because, "it's not doing shit " Pt currently upset, yelling, and crying over the phone with [employer] because she believes her phone has been hacked and whoever hacked it is currently recording her  [facility] AP notified  Will continue to monitor

## 2021-03-19 NOTE — CASE MANAGEMENT
LOS 2   NOT A BUNDLE;  READMISSION;  YELLOW UNPLANNED READMISSION RISK  Cm attempted to meet with pt and she refused to speak with cm asking cm to leave  Pt was recently came to the ED and was admitted nad treated for a needle that was stuck in her neck from IV drug use  Pt ultimately left AMA  Pt then returned to the ED the next day for same  Pt was seen by surgery who are not going to do any surgical intervention  Pt was found to have cellulitis  This is being treated with IV abx  Cm conducted a chart review finding pt lives with her mother in a [address]  Pt is independent and does not use DME  She drives but is unemployed  Per chart review, pt's PCP is Abigail Loco PA-C and she uses [facility] in [place] for medications  Pt has had multiple inpt D&A admissions including [place] in 2020  Transportation is TBD

## 2021-03-19 NOTE — PROGRESS NOTES
[facility]  Progress Note - Dariel Skinner 1990, 31 y o  female MRN: [id number]  Unit/Bed#: S -01 Encounter: [id number]  Primary Care Provider: Andrés Salgado PA-C   Date and time admitted to hospital: 3/17/2021  8:17 AM    * Right forearm cellulitis  Assessment & Plan  · With associated fluctuance and IV drug use, no abscess noted  Blood cultures negative at 48 hours, 5 days from 3/13  · Stable for d/c on PO abx-- Keflex/Doxy thru 3/27 pending BMP   · Needs OP ID f/u in 2 weeks    Retained foreign body  Assessment & Plan  · Retained needle in left neck, early phlegmon noted on CT scan  · Surgical consult noted, no removal planned at this time   · ? Phlegmon   · Need serial close exams  · Monitor for any signs of hot potato voice or airway compromise    Exposure to STD  Assessment & Plan  · Patient had high risk vaginal intercourse, possibly in exchange for drugs   · Received HIV exposure meds in ED  · ID consult appreciated   · Continue Isentress and Truvada for PEP for 28 days   · Check HIV 6 week, 3 month, 6 months   · Follow up RPR, GC/Chlamydia   · Monitor CBC, CMP    Elevated troponin  Assessment & Plan  · Likely non MI troponin elevation but she has a history of TV repair due to endocarditis  · Stop telemetry   · Start baby aspirin  · Consult Cardiology appreciated, no further management from their end     DVT of axillary vein, acute left ([address] )  Assessment & Plan  · Patient noncompliant with Eliquis, will utilize therapeutic Lovenox while admitted  · Transition to PO anticoagulation at discharge    Intravenous drug abuse (HCC)  Assessment & Plan  · Heroin / Meth / Cocaine noted in urine   Patient continues to feel ill   · Continue Clonidine 0 2mg BID  · Seroquel scheduled at bedtime   · Restart Klonopin 0 5 mg PO BID - patient takes this scheduled as outpatient; patient asked for this to be increased by our psych but as patient is medically stable for d/c she can follow up w/ her Rx provider as an OP  · Ativan 0 5mg IV Q4 PRN   · Patient reports that she will work on getting herself into treatment on her own      VTE Pharmacologic Prophylaxis:   Pharmacologic: Enoxaparin (Lovenox)  Mechanical VTE Prophylaxis in Place: Yes    Patient Centered Rounds: I have performed bedside rounds with nursing staff today  Discussions with Specialists or Other Care Team Provider: CLIF, RN     Education and Discussions with Family / Patient: patient    Time Spent for Care: 30 minutes  More than 50% of total time spent on counseling and coordination of care as described above  Current Length of Stay: 2 day(s)    Current Patient Status: Inpatient   Certification Statement: The patient will continue to require additional inpatient hospital stay due to fatigue, continued tx of cellulitis    Discharge Plan: d/c tomorrow     Code Status: Level 1 - Full Code      Subjective:   Patient feels tired  She reports pain in neck/arm/abdomen  This is chronic per patient  She does not want to d/c today because she does not have a place to go and she needs to work on arranging 'a way to rehab ' when asked if she was going to work on this while in the hospital, she said 'i'm going to sleep '     Objective:     Vitals:   Temp (24hrs), Av 8 °F (36 6 °C), Min:97 5 °F (36 4 °C), Max:97 9 °F (36 6 °C)    Temp:  [97 5 °F (36 4 °C)-97 9 °F (36 6 °C)] 97 5 °F (36 4 °C)  HR:  [58-96] 58  Resp:  [16-18] 16  BP: (110-111)/(53-64) 110/64  SpO2:  [97 %-100 %] 98 %  Body mass index is 23 85 kg/m²  Input and Output Summary (last 24 hours): Intake/Output Summary (Last 24 hours) at 3/19/2021 1415  Last data filed at 3/19/2021 0110  Gross per 24 hour   Intake 990 ml   Output 800 ml   Net 190 ml       Physical Exam:     Physical Exam  Constitutional:       General: She is not in acute distress  HENT:      Head: Normocephalic        Mouth/Throat:      Mouth: Mucous membranes are moist    Eyes:      Pupils: Pupils are equal, round, and reactive to light  Neck:      Musculoskeletal: Normal range of motion  No neck rigidity or muscular tenderness  Cardiovascular:      Rate and Rhythm: Normal rate and regular rhythm  Heart sounds: Murmur present  No friction rub  No gallop  Pulmonary:      Effort: Pulmonary effort is normal       Breath sounds: Normal breath sounds  No wheezing  Abdominal:      General: Abdomen is flat  Palpations: Abdomen is soft  Tenderness: There is no abdominal tenderness  Musculoskeletal:         General: Swelling (RUE) present  Lymphadenopathy:      Cervical: No cervical adenopathy  Skin:     General: Skin is warm and dry  Findings: No erythema  Neurological:      General: No focal deficit present  Mental Status: She is alert  Mental status is at baseline  Psychiatric:         Mood and Affect: Mood normal        Additional Data:     Labs:    Results from last 7 days   Lab Units 03/19/21  1319   WBC Thousand/uL 4 72   HEMOGLOBIN g/dL 11 9   HEMATOCRIT % 36 2   PLATELETS Thousands/uL 222   NEUTROS PCT % 36*   LYMPHS PCT % 43   MONOS PCT % 12   EOS PCT % 8*     Results from last 7 days   Lab Units 03/19/21  1319 03/18/21  0655   SODIUM mmol/L 139 137   POTASSIUM mmol/L 4 4 5 1   CHLORIDE mmol/L 107 103   CO2 mmol/L 25 21   BUN mg/dL 18 17   CREATININE mg/dL 1 28 1 01   ANION GAP mmol/L 7 13   CALCIUM mg/dL 8 3 9 3   ALBUMIN g/dL  --  3 4*   TOTAL BILIRUBIN mg/dL  --  0 51   ALK PHOS U/L  --  79   ALT U/L  --  41   AST U/L  --  76*   GLUCOSE RANDOM mg/dL 81 90     Results from last 7 days   Lab Units 03/17/21  0928   INR  0 98             Results from last 7 days   Lab Units 03/18/21  0655 03/17/21  0928 03/14/21  0640 03/13/21  2045   LACTIC ACID mmol/L  --  1 2  --  0 9   PROCALCITONIN ng/ml <0 05 <0 05 <0 05 <0 05           * I Have Reviewed All Lab Data Listed Above  * Additional Pertinent Lab Tests Reviewed:  [address] Admission Reviewed    Imaging:    Imaging Reports Reviewed Today Include: US R forearm  Imaging Personally Reviewed by Myself Includes:  US R forearm    Recent Cultures (last 7 days):     Results from last 7 days   Lab Units 03/17/21  0928 03/17/21  0920 03/13/21 2052 03/13/21 2046   BLOOD CULTURE  No Growth at 48 hrs  No Growth at 48 hrs  No Growth After 5 Days  No Growth After 5 Days  Last 24 Hours Medication List:   Current Facility-Administered Medications   Medication Dose Route Frequency Provider Last Rate    aspirin  81 mg Oral Daily Melissa Zavlaa MD      [START ON 3/20/2021] cephalexin  500 mg Oral Q6H [facility] Ilene Hernandez PA-C      clonazePAM  0 5 mg Oral BID Young Grigsby PA-C      cloNIDine  0 2 mg Oral Q12H [facility] Lorrain Scheuermann, MD      [START ON 3/20/2021] doxycycline hyclate  100 mg Oral Q12H [facility] Ilene Hernandez PA-C      emtricitabine-tenofovir (TRUVADA 200-300) combo dose   Oral Daily Melissa Zavala MD      enoxaparin  1 mg/kg Subcutaneous Q12H [facility] Lorrain Scheuermann, MD      LORazepam  0 5 mg Intravenous Q4H PRN Ronnell Latham MD      metoprolol  5 mg Intravenous Q6H PRN Ronnell Latham MD      nicotine  1 patch Transdermal Daily Ronnell Latham MD      ondansetron  4 mg Intravenous Q6H PRN Ever Yi PA-C      QUEtiapine  25 mg Oral HS Young Grigsby PA-C      raltegravir  400 mg Oral BID Ese Parekh PA-C      sodium chloride (PF)  3 mL Intravenous Q1H PRN Lorrain Scheuermann, MD      sodium chloride  125 mL/hr Intravenous Continuous Ronnell Latham  mL/hr (03/19/21 0110)    vancomycin  750 mg Intravenous Q12H Ilene Hernandez PA-C          Today, Patient Was Seen By: Cathryn Rogers PA-C    ** Please Note: Dictation voice to text software may have been used in the creation of this document   **

## 2021-03-19 NOTE — CASE MANAGEMENT
Cm attempted to meet with pt to review assistance with D&A rehab as well as HOST  Pt refused to meet with cm and requested cm leave the room

## 2021-03-19 NOTE — PROGRESS NOTES
Progress Note - General Surgery   Ritu Boone 31 y o  female MRN: [id number]  Unit/Bed#: S -01 Encounter: [id number]    Assessment:  32 yo F PMH IVDU, hx of DVT axillary V (8/2020, noncompliant with Eliquis), hepatitis, endocarditis s/p TVRepair (9/2020), here with R forearm cellulitis, and bilateral neck cellulitis/phlegmon with retained FB in L neck  Both areas suspicious for infection without any fluctuance  Plan:  Recommend no surgical intervention at this time   - surgery will sign off at this time however please feel free to reach out if exam changes or concern for abscess recurs  Continue IV abx, per ID recommendation  Rest of care per primary    Subjective/Objective     Subjective:  No acute events overnight  Patient simply complaining of anxiety  Objective:    Blood pressure 111/53, pulse 96, temperature 97 9 °F (36 6 °C), temperature source Oral, resp  rate 17, weight 65 kg (143 lb 4 8 oz), last menstrual period 03/15/2021, SpO2 100 %, not currently breastfeeding  ,Body mass index is 23 85 kg/m²  Intake/Output Summary (Last 24 hours) at 3/19/2021 0724  Last data filed at 3/19/2021 0110  Gross per 24 hour   Intake 990 ml   Output 800 ml   Net 190 ml       Invasive Devices     Peripheral Intravenous Line            Peripheral IV 03/18/21 Distal;Right;Upper;Ventral (anterior) Arm 1 day                Physical Exam: General: AAOx3  Head: normocephalic, atraumatic  Neck: supple, trachea midline, Left neck just superior to clavicle has indurated feel however non erythematous, no fluctuance  Respiratory: BS b/l  Abdomen: Soft, NT, no rebound/guarding  Heart: RRR, S1s2  Ext: RUE with phlegmanous/indurated changes just distal to AC fossa on volar surface, non erythematous, no fluctuance      Lab, Imaging and other studies:I have personally reviewed pertinent lab results      VTE Pharmacologic Prophylaxis: Enoxaparin (Lovenox)  VTE Mechanical Prophylaxis: sequential compression device

## 2021-03-19 NOTE — ASSESSMENT & PLAN NOTE
· Patient had high risk vaginal intercourse, possibly in exchange for drugs   · Received HIV exposure meds in ED  · ID consult appreciated   · Continue Isentress and Truvada for PEP for 28 days   · Check HIV 6 week, 3 month, 6 months   · Follow up RPR, GC/Chlamydia   · Monitor CBC, CMP

## 2021-03-19 NOTE — PROGRESS NOTES
Progress Note - Infectious Disease   Nasreen Hayden 31 y o  female MRN: [id number]  Unit/Bed#: S -01 Encounter: [id number]      Impression/Plan:  1  Right forearm/right hand cellulitis/left clavicular cellulitis-with retained needle in the left clavicular region   Improved by history   Likely staphylococcal   Seems to be tolerating the antibiotics without difficulty  Continues to improve clinically  -continue vancomycin for now  -pharmacy follow-up for vancomycin trough management  -serial exams  -follow-up blood cultures  -recheck CBC with diff and BMP  -if BMP and CBC with diff are stable, recommend Bactrim DS tab p o  Q 12 hours through 3/27/2021 to complete 14 days total  -if worsening renal function, would use Keflex and doxy for the same duration     2  Active injection drug use-complicated by hepatitis-C chronically   Still with active injection drug use despite having major complications from her previous injection drug use  -needs GI follow-up as an outpatient for treatment of hepatitis-C  -follow-up in clinic for Suboxone use       3  High risk receptive vaginal intercourse-apparently in exchange for drugs   Consideration for the possibility of STD exposure  Urine GC and Chlamydia negative    RPR still pending  -continue post exposure prophylaxis with ISENTRESS and Truvada for 28 days  -need CBC with diff and CMP at 14 days and 28 days of treat    -recheck HIV screen at 6 weeks, 3 months, 6 months  -follow-up serum RPR    Discussed the above management plan in detail with the primary service    Will follow the patient intermittently as needed    Needs follow-up with Infectious Disease in the office in approximately 2 weeks    I spent 35 minutes on the unit floor of which 20 minutes was in counseling/coordination of care as outlined in my note    Antibiotics:  Vancomycin restart 3  Total antibiotics 6    Subjective:  Patient has no fever, chills, sweats; no nausea, vomiting, diarrhea; no cough, shortness of breath; no increased pain  No new symptoms  She is much were awake and interactive today    Objective:  Vitals:  Temp:  [97 5 °F (36 4 °C)-97 9 °F (36 6 °C)] 97 5 °F (36 4 °C)  HR:  [58-96] 58  Resp:  [16-18] 16  BP: (110-111)/(53-64) 110/64  SpO2:  [97 %-100 %] 98 %  Temp (24hrs), Av 8 °F (36 6 °C), Min:97 5 °F (36 4 °C), Max:97 9 °F (36 6 °C)  Current: Temperature: 97 5 °F (36 4 °C)    Physical Exam:   General Appearance:  Alert, interactive, nontoxic, no acute distress  Throat: Oropharynx moist without lesions  Lungs:   Clear to auscultation bilaterally; no wheezes, rhonchi or rales; respirations unlabored   Heart:  RRR; no murmur, rub or gallop   Abdomen:   Soft, non-tender, non-distended, positive bowel sounds  Extremities: No clubbing, cyanosis  Right forearm with induration but faded erythema  Left supraclavicular region with nearly resolved erythema  Area of induration but no fluctuance   Skin: No new rashes or lesions  No draining wounds noted  Labs, Imaging, & Other studies:   All pertinent labs and imaging studies were personally reviewed  Results from last 7 days   Lab Units 21  0655 21  0928 21  0611   WBC Thousand/uL 5 91 7 35 4 77   HEMOGLOBIN g/dL 13 0 12 7 11 3*   PLATELETS Thousands/uL 244 273 253     Results from last 7 days   Lab Units 21  0655 21  0928 21  0611  21  0640   SODIUM mmol/L 137 138 141   < > 146*   POTASSIUM mmol/L 5 1 3 7 3 9   < > 3 4*   CHLORIDE mmol/L 103 103 108   < > 112*   CO2 mmol/L 21 30 29   < > 28   BUN mg/dL 17 9 7   < > 8   CREATININE mg/dL 1 01 0 67 0 57*   < > 0 60   EGFR ml/min/1 73sq m 76 120 127   < > 124   CALCIUM mg/dL 9 3 9 3 8 7   < > 8 4   AST U/L 76* 40  --   --  30   ALT U/L 41 38  --   --  38   ALK PHOS U/L 79 84  --   --  95    < > = values in this interval not displayed       Results from last 7 days   Lab Units 21   BLOOD CULTURE  No Growth at 24 hrs  No Growth at 24 hrs  No Growth After 5 Days  No Growth After 5 Days       Results from last 7 days   Lab Units 03/18/21  0655 03/17/21  0928 03/14/21  0640 03/13/21 2045   PROCALCITONIN ng/ml <0 05 <0 05 <0 05 <0 05

## 2021-03-19 NOTE — ASSESSMENT & PLAN NOTE
· Heroin / Meth / Cocaine noted in urine   Patient continues to feel ill   · Continue Clonidine 0 2mg BID  · Seroquel scheduled at bedtime   · Restart Klonopin 0 5 mg PO BID - patient takes this scheduled as outpatient; patient asked for this to be increased by our psych but as patient is medically stable for d/c she can follow up w/ her Rx provider as an OP  · Ativan 0 5mg IV Q4 PRN   · Patient reports that she will work on getting herself into treatment on her own

## 2021-03-19 NOTE — PROGRESS NOTES
Vancomycin IV Pharmacy-to-Dose Consultation    Sharif Saavedra is a 31 y o  female who is currently receiving Vancomycin IV with management by the Pharmacy Consult service  Assessment/Plan:  The patient was reviewed  Renal function is stable and no signs or symptoms of nephrotoxicity and/or infusion reactions were documented in the chart  Based on todays trough of 26 5, will adjust current vancomycin (day # 3) dosing of 1250mg q12h to vancomycin 750mg q12h, with a plan for trough to be drawn at 1730 on 3/20  We will continue to follow the patients culture results and clinical progress daily      Marshall Trejo, Pharmacist

## 2021-03-20 PROBLEM — Z59.00 HOMELESSNESS: Status: ACTIVE | Noted: 2021-03-20

## 2021-03-20 PROCEDURE — 99232 SBSQ HOSP IP/OBS MODERATE 35: CPT | Performed by: PHYSICIAN ASSISTANT

## 2021-03-20 RX ORDER — EMTRICITABINE AND TENOFOVIR DISOPROXIL FUMARATE 200; 300 MG/1; MG/1
1 TABLET, FILM COATED ORAL DAILY
Qty: 30 TABLET | Refills: 0 | Status: SHIPPED | OUTPATIENT
Start: 2021-03-20 | End: 2021-03-20 | Stop reason: SDUPTHER

## 2021-03-20 RX ORDER — EMTRICITABINE AND TENOFOVIR DISOPROXIL FUMARATE 200; 300 MG/1; MG/1
1 TABLET, FILM COATED ORAL DAILY
Qty: 30 TABLET | Refills: 0 | Status: SHIPPED | OUTPATIENT
Start: 2021-03-20 | End: 2021-05-15 | Stop reason: HOSPADM

## 2021-03-20 RX ORDER — LORAZEPAM 2 MG/ML
0.5 INJECTION INTRAMUSCULAR ONCE
Status: COMPLETED | OUTPATIENT
Start: 2021-03-20 | End: 2021-03-20

## 2021-03-20 RX ORDER — LORAZEPAM 0.5 MG/1
0.5 TABLET ORAL ONCE
Status: DISCONTINUED | OUTPATIENT
Start: 2021-03-20 | End: 2021-03-21 | Stop reason: HOSPADM

## 2021-03-20 RX ADMIN — LORAZEPAM 0.5 MG: 2 INJECTION INTRAMUSCULAR; INTRAVENOUS at 05:46

## 2021-03-20 RX ADMIN — LORAZEPAM 0.5 MG: 2 INJECTION INTRAMUSCULAR; INTRAVENOUS at 01:01

## 2021-03-20 RX ADMIN — RALTEGRAVIR 400 MG: 400 TABLET, FILM COATED ORAL at 21:18

## 2021-03-20 RX ADMIN — DOXYCYCLINE 100 MG: 100 CAPSULE ORAL at 10:42

## 2021-03-20 RX ADMIN — RALTEGRAVIR 400 MG: 400 TABLET, FILM COATED ORAL at 10:45

## 2021-03-20 RX ADMIN — CLONAZEPAM 0.5 MG: 0.5 TABLET ORAL at 10:42

## 2021-03-20 RX ADMIN — CLONAZEPAM 0.5 MG: 0.5 TABLET ORAL at 17:39

## 2021-03-20 RX ADMIN — CEPHALEXIN 500 MG: 500 CAPSULE ORAL at 05:46

## 2021-03-20 RX ADMIN — CLONIDINE HYDROCHLORIDE 0.2 MG: 0.1 TABLET ORAL at 10:42

## 2021-03-20 RX ADMIN — LORAZEPAM 0.5 MG: 2 INJECTION INTRAMUSCULAR; INTRAVENOUS at 10:46

## 2021-03-20 RX ADMIN — LORAZEPAM 0.5 MG: 2 INJECTION INTRAMUSCULAR; INTRAVENOUS at 15:40

## 2021-03-20 RX ADMIN — CEPHALEXIN 500 MG: 500 CAPSULE ORAL at 17:40

## 2021-03-20 RX ADMIN — LORAZEPAM 0.5 MG: 2 INJECTION INTRAMUSCULAR; INTRAVENOUS at 21:19

## 2021-03-20 RX ADMIN — EMTRICITABINE: 200 CAPSULE ORAL at 10:44

## 2021-03-20 RX ADMIN — DOXYCYCLINE 100 MG: 100 CAPSULE ORAL at 21:19

## 2021-03-20 RX ADMIN — ASPIRIN 81 MG: 81 TABLET, COATED ORAL at 10:42

## 2021-03-20 RX ADMIN — QUETIAPINE FUMARATE 25 MG: 25 TABLET ORAL at 21:18

## 2021-03-20 RX ADMIN — ENOXAPARIN SODIUM 70 MG: 80 INJECTION SUBCUTANEOUS at 10:41

## 2021-03-20 RX ADMIN — LORAZEPAM 0.5 MG: 2 INJECTION INTRAMUSCULAR; INTRAVENOUS at 01:23

## 2021-03-20 RX ADMIN — ONDANSETRON 4 MG: 2 INJECTION INTRAMUSCULAR; INTRAVENOUS at 10:53

## 2021-03-20 NOTE — PLAN OF CARE
Problem: SKIN/TISSUE INTEGRITY - ADULT  Goal: Skin integrity remains intact  Description: INTERVENTIONS  - Identify patients at risk for skin breakdown  - Assess and monitor skin integrity  - Assess and monitor nutrition and hydration status  - Monitor labs (i e  albumin)  - Assess for incontinence   - Turn and reposition patient  - Assist with mobility/ambulation  - Relieve pressure over bony prominences  - Avoid friction and shearing  - Provide appropriate hygiene as needed including keeping skin clean and dry  - Evaluate need for skin moisturizer/barrier cream  - Collaborate with interdisciplinary team (i e  Nutrition, Rehabilitation, etc )   - Patient/family teaching  Outcome: Progressing  Goal: Incision(s), wounds(s) or drain site(s) healing without S/S of infection  Description: INTERVENTIONS  - Assess and document risk factors for skin impairment   - Assess and document dressing, incision, wound bed, drain sites and surrounding tissue  - Consider nutrition services referral as needed  - Oral mucous membranes remain intact  - Provide patient/ family education  Outcome: Progressing  Goal: Oral mucous membranes remain intact  Description: INTERVENTIONS  - Assess oral mucosa and hygiene practices  - Implement preventative oral hygiene regimen  - Implement oral medicated treatments as ordered  - Initiate Nutrition services referral as needed  Outcome: Progressing     Problem: PAIN - ADULT  Goal: Verbalizes/displays adequate comfort level or baseline comfort level  Description: Interventions:  - Encourage patient to monitor pain and request assistance  - Assess pain using appropriate pain scale  - Administer analgesics based on type and severity of pain and evaluate response  - Implement non-pharmacological measures as appropriate and evaluate response  - Consider cultural and social influences on pain and pain management  - Notify physician/advanced practitioner if interventions unsuccessful or patient reports new pain  Outcome: Progressing     Problem: INFECTION - ADULT  Goal: Absence or prevention of progression during hospitalization  Description: INTERVENTIONS:  - Assess and monitor for signs and symptoms of infection  - Monitor lab/diagnostic results  - Monitor all insertion sites, i e  indwelling lines, tubes, and drains  - Monitor endotracheal if appropriate and nasal secretions for changes in amount and color  - Carlin appropriate cooling/warming therapies per order  - Administer medications as ordered  - Instruct and encourage patient and family to use good hand hygiene technique  - Identify and instruct in appropriate isolation precautions for identified infection/condition  Outcome: Progressing  Goal: Absence of fever/infection during neutropenic period  Description: INTERVENTIONS:  - Monitor WBC    Outcome: Progressing

## 2021-03-20 NOTE — ASSESSMENT & PLAN NOTE
· With associated fluctuance and IV drug use, no abscess noted  Blood cultures negative at 48 hours, 5 days from 3/13  · Stable for d/c on PO abx-- Keflex/Doxy thru 3/27   Cleared by ID
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
## 2021-03-20 NOTE — ASSESSMENT & PLAN NOTE
· Heroin / Meth / Cocaine noted in urine  Patient continues to feel ill   · Continue Clonidine 0 2mg BID  · Seroquel scheduled at bedtime   · Restart Klonopin 0 5 mg PO BID - patient takes this scheduled as outpatient; patient asked for this to be increased by our psych but as patient is medically stable for d/c she can follow up w/ her Rx provider as an OP refills   · Ativan 0 5mg IV Q4 PRN   · Pt initially declined CM and HOST but when stated she is medically stable for discharge has elected for HOST  D/w CM

## 2021-03-20 NOTE — ASSESSMENT & PLAN NOTE
· Patient had high risk vaginal intercourse, possibly in exchange for drugs   · Received HIV exposure meds in ED  · ID consult appreciated   · Continue Isentress and Truvada for PEP for 28 days      · Check HIV 6 week, 3 month, 6 months   · Follow up RPR, GC/Chlamydia   · Checking drug costs w/[facility]

## 2021-03-20 NOTE — CASE MANAGEMENT
Price check is needed for truvada and isentress  Scripts were sent to [facility] however they are not a [address]  Cm requested medications be sent to [facility] which is pt's home pharmacy as she gets her other medications there  Cm called to find pricing however the pharmacist stated they did not receive the scripts  Cm requested Dolores (MARLA) send the medications again  Cm will follow up with pricing

## 2021-03-20 NOTE — ASSESSMENT & PLAN NOTE
· With associated fluctuance and IV drug use, no abscess noted  Blood cultures negative at 48 hours, 5 days from 3/13  · Stable for d/c on PO abx-- Keflex/Doxy thru 3/27   Cleared by ID  · Currently trying to price check truvada/isentress for hiv prophylaxis

## 2021-03-21 VITALS
SYSTOLIC BLOOD PRESSURE: 108 MMHG | BODY MASS INDEX: 23.85 KG/M2 | TEMPERATURE: 98.4 F | DIASTOLIC BLOOD PRESSURE: 71 MMHG | HEART RATE: 76 BPM | WEIGHT: 143.3 LBS | OXYGEN SATURATION: 96 % | RESPIRATION RATE: 18 BRPM

## 2021-03-21 PROBLEM — N76.0 BV (BACTERIAL VAGINOSIS): Status: ACTIVE | Noted: 2021-03-21

## 2021-03-21 PROBLEM — B96.89 BV (BACTERIAL VAGINOSIS): Status: ACTIVE | Noted: 2021-03-21

## 2021-03-21 LAB
ANION GAP SERPL CALCULATED.3IONS-SCNC: 8 MMOL/L (ref 4–13)
BUN SERPL-MCNC: 9 MG/DL (ref 5–25)
CALCIUM SERPL-MCNC: 9.1 MG/DL (ref 8.3–10.1)
CHLORIDE SERPL-SCNC: 103 MMOL/L (ref 100–108)
CO2 SERPL-SCNC: 30 MMOL/L (ref 21–32)
CREAT SERPL-MCNC: 1.06 MG/DL (ref 0.6–1.3)
GFR SERPL CREATININE-BSD FRML MDRD: 72 ML/MIN/1.73SQ M
GLUCOSE SERPL-MCNC: 98 MG/DL (ref 65–140)
POTASSIUM SERPL-SCNC: 4.2 MMOL/L (ref 3.5–5.3)
SODIUM SERPL-SCNC: 141 MMOL/L (ref 136–145)

## 2021-03-21 PROCEDURE — 99232 SBSQ HOSP IP/OBS MODERATE 35: CPT | Performed by: INTERNAL MEDICINE

## 2021-03-21 PROCEDURE — 80048 BASIC METABOLIC PNL TOTAL CA: CPT | Performed by: INTERNAL MEDICINE

## 2021-03-21 PROCEDURE — 99239 HOSP IP/OBS DSCHRG MGMT >30: CPT | Performed by: PHYSICIAN ASSISTANT

## 2021-03-21 RX ORDER — CLONIDINE HYDROCHLORIDE 0.2 MG/1
0.2 TABLET ORAL EVERY 12 HOURS SCHEDULED
Qty: 60 TABLET | Refills: 0 | Status: SHIPPED | OUTPATIENT
Start: 2021-03-21 | End: 2021-05-15 | Stop reason: HOSPADM

## 2021-03-21 RX ORDER — ASPIRIN 81 MG/1
81 TABLET ORAL DAILY
Refills: 0
Start: 2021-03-22 | End: 2021-05-20

## 2021-03-21 RX ORDER — METRONIDAZOLE 500 MG/1
500 TABLET ORAL EVERY 12 HOURS SCHEDULED
Qty: 14 TABLET | Refills: 0 | Status: SHIPPED | OUTPATIENT
Start: 2021-03-21 | End: 2021-03-28

## 2021-03-21 RX ORDER — ONDANSETRON 4 MG/1
4 TABLET, ORALLY DISINTEGRATING ORAL EVERY 6 HOURS PRN
Qty: 20 TABLET | Refills: 0 | Status: SHIPPED | OUTPATIENT
Start: 2021-03-21 | End: 2021-05-15 | Stop reason: HOSPADM

## 2021-03-21 RX ORDER — CEPHALEXIN 500 MG/1
500 CAPSULE ORAL EVERY 6 HOURS SCHEDULED
Qty: 27 CAPSULE | Refills: 0 | Status: SHIPPED | OUTPATIENT
Start: 2021-03-21 | End: 2021-03-28

## 2021-03-21 RX ORDER — QUETIAPINE FUMARATE 25 MG/1
25 TABLET, FILM COATED ORAL
Qty: 30 TABLET | Refills: 0 | Status: SHIPPED | OUTPATIENT
Start: 2021-03-21 | End: 2021-05-22 | Stop reason: HOSPADM

## 2021-03-21 RX ORDER — CLONAZEPAM 0.5 MG/1
0.5 TABLET ORAL 2 TIMES DAILY
Qty: 6 TABLET | Refills: 0 | Status: ON HOLD | OUTPATIENT
Start: 2021-03-21 | End: 2021-05-15

## 2021-03-21 RX ORDER — DOXYCYCLINE HYCLATE 100 MG/1
100 CAPSULE ORAL EVERY 12 HOURS SCHEDULED
Qty: 14 CAPSULE | Refills: 0 | Status: SHIPPED | OUTPATIENT
Start: 2021-03-21 | End: 2021-03-28

## 2021-03-21 RX ORDER — NICOTINE 21 MG/24HR
1 PATCH, TRANSDERMAL 24 HOURS TRANSDERMAL DAILY
Qty: 28 PATCH | Refills: 0 | Status: SHIPPED | OUTPATIENT
Start: 2021-03-22 | End: 2021-05-20

## 2021-03-21 RX ADMIN — LORAZEPAM 0.5 MG: 2 INJECTION INTRAMUSCULAR; INTRAVENOUS at 10:57

## 2021-03-21 RX ADMIN — CLONAZEPAM 0.5 MG: 0.5 TABLET ORAL at 10:25

## 2021-03-21 RX ADMIN — LORAZEPAM 0.5 MG: 2 INJECTION INTRAMUSCULAR; INTRAVENOUS at 14:12

## 2021-03-21 RX ADMIN — CLONIDINE HYDROCHLORIDE 0.2 MG: 0.1 TABLET ORAL at 10:24

## 2021-03-21 RX ADMIN — RALTEGRAVIR 400 MG: 400 TABLET, FILM COATED ORAL at 10:27

## 2021-03-21 RX ADMIN — ENOXAPARIN SODIUM 70 MG: 80 INJECTION SUBCUTANEOUS at 10:23

## 2021-03-21 RX ADMIN — EMTRICITABINE: 200 CAPSULE ORAL at 10:27

## 2021-03-21 RX ADMIN — CEPHALEXIN 500 MG: 500 CAPSULE ORAL at 00:48

## 2021-03-21 RX ADMIN — ASPIRIN 81 MG: 81 TABLET, COATED ORAL at 10:25

## 2021-03-21 RX ADMIN — DOXYCYCLINE 100 MG: 100 CAPSULE ORAL at 10:24

## 2021-03-21 RX ADMIN — ONDANSETRON 4 MG: 2 INJECTION INTRAMUSCULAR; INTRAVENOUS at 10:58

## 2021-03-21 RX ADMIN — LORAZEPAM 0.5 MG: 2 INJECTION INTRAMUSCULAR; INTRAVENOUS at 02:31

## 2021-03-21 RX ADMIN — CEPHALEXIN 500 MG: 500 CAPSULE ORAL at 06:23

## 2021-03-21 RX ADMIN — CEPHALEXIN 500 MG: 500 CAPSULE ORAL at 14:12
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
## 2021-03-21 NOTE — CASE MANAGEMENT
Hermes contacted the Pharmacist at [facility] who stated pt's medications were sent to pt's specialty pharmacy directly once they were sent to [facility]   Due to pt's insurance, these med actions need to be sent there  He stated the specialty pharmacy will follow up with the pt however it may take a few days  This information has been passed on to CATHY Townsend)  Hermes searched [employer] website to find specialty pharmacies  CM found [facility] (through [facility]) and [address] [place] [address]  Both locations are closed  CM will provide pt with contact information and she will be able to follow up with them tomorrow

## 2021-03-21 NOTE — PROGRESS NOTES
Pt stated she heard rats in her room  Monitored the room and no rats were noted  Notified [facility] NP Liv Robledo; ordered vital signs and neuro checks and pt refused  Yonny Aas made aware; stated to try again later  Will pass on to next shift
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
## 2021-03-21 NOTE — PLAN OF CARE
Problem: SKIN/TISSUE INTEGRITY - ADULT  Goal: Skin integrity remains intact  Description: INTERVENTIONS  - Identify patients at risk for skin breakdown  - Assess and monitor skin integrity  - Assess and monitor nutrition and hydration status  - Monitor labs (i e  albumin)  - Assess for incontinence   - Turn and reposition patient  - Assist with mobility/ambulation  - Relieve pressure over bony prominences  - Avoid friction and shearing  - Provide appropriate hygiene as needed including keeping skin clean and dry  - Evaluate need for skin moisturizer/barrier cream  - Collaborate with interdisciplinary team (i e  Nutrition, Rehabilitation, etc )   - Patient/family teaching  Outcome: Progressing  Goal: Incision(s), wounds(s) or drain site(s) healing without S/S of infection  Description: INTERVENTIONS  - Assess and document risk factors for skin impairment   - Assess and document dressing, incision, wound bed, drain sites and surrounding tissue  - Consider nutrition services referral as needed  - Oral mucous membranes remain intact  - Provide patient/ family education  Outcome: Progressing  Goal: Oral mucous membranes remain intact  Description: INTERVENTIONS  - Assess oral mucosa and hygiene practices  - Implement preventative oral hygiene regimen  - Implement oral medicated treatments as ordered  - Initiate Nutrition services referral as needed  Outcome: Progressing     Problem: PAIN - ADULT  Goal: Verbalizes/displays adequate comfort level or baseline comfort level  Description: Interventions:  - Encourage patient to monitor pain and request assistance  - Assess pain using appropriate pain scale  - Administer analgesics based on type and severity of pain and evaluate response  - Implement non-pharmacological measures as appropriate and evaluate response  - Consider cultural and social influences on pain and pain management  - Notify physician/advanced practitioner if interventions unsuccessful or patient reports new pain  Outcome: Progressing     Problem: INFECTION - ADULT  Goal: Absence or prevention of progression during hospitalization  Description: INTERVENTIONS:  - Assess and monitor for signs and symptoms of infection  - Monitor lab/diagnostic results  - Monitor all insertion sites, i e  indwelling lines, tubes, and drains  - Monitor endotracheal if appropriate and nasal secretions for changes in amount and color  - [place] appropriate cooling/warming therapies per order  - Administer medications as ordered  - Instruct and encourage patient and family to use good hand hygiene technique  - Identify and instruct in appropriate isolation precautions for identified infection/condition  Outcome: Progressing  Goal: Absence of fever/infection during neutropenic period  Description: INTERVENTIONS:  - Monitor WBC    Outcome: Progressing
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
## 2021-03-21 NOTE — PROGRESS NOTES
Progress Note - Infectious Disease   Dariel Skinner 31 y o  female MRN: [id number]  Unit/Bed#: S -01 Encounter: [id number]      Impression/Plan:  1  Right forearm/right hand cellulitis/left clavicular cellulitis-with retained needle in the left clavicular region   Improved by history   Likely staphylococcal   Seems to be tolerating the antibiotics without difficulty  Continues to improve clinically  -continue Keflex and doxycycline through 3/27/2021 to complete 14 days total treatment  -serial exams  -follow-up blood cultures  -recheck CBC with diff and CMP as below     2  Active injection drug use-complicated by hepatitis-C chronically   Still with active injection drug use despite having major complications from her previous injection drug use  -needs GI follow-up as an outpatient for treatment of hepatitis-C  -follow-up in clinic for Suboxone use       3  High risk receptive vaginal intercourse-apparently in exchange for drugs   Consideration for the possibility of STD exposure  Urine GC and Chlamydia negative  RPR negative   -continue post exposure prophylaxis with ISENTRESS and Truvada for 28 days  -need CBC with diff and CMP at 14 days and 28 days of treat    -recheck HIV screen at 6 weeks, 3 months, 6 months    4  Increased serum creatinine-the creatinine has now come down with the GFR now being well above 60  Suspect this may have been a pre renal issue per medication effect  -recheck renal function as an outpatient as per the above has      Discussed the above management plan with the primary service    Antibiotics:  Keflex 3  Doxycycline 3  Antibiotics 7    Subjective:  Patient has no fever, chills, sweats; no nausea, vomiting, diarrhea; no cough, shortness of breath; no increased pain  No new symptoms  She remains quite anxious      Objective:  Vitals:  Temp:  [98 4 °F (36 9 °C)] 98 4 °F (36 9 °C)  HR:  [76] 76  Resp:  [18] 18  BP: (108)/(71) 108/71  SpO2:  [96 %] 96 %  Temp (24hrs), Av 4 °F (36 9 °C), Min:98 4 °F (36 9 °C), Max:98 4 °F (36 9 °C)  Current: Temperature: 98 4 °F (36 9 °C)    Physical Exam:   General Appearance:  Alert, interactive, nontoxic, no acute distress  Throat: Oropharynx moist without lesions  Lungs:   Clear to auscultation bilaterally; no wheezes, rhonchi or rales; respirations unlabored   Heart:  RRR; no murmur, rub or gallop   Abdomen:   Soft, non-tender, non-distended, positive bowel sounds  Extremities: No clubbing, cyanosis or edema  Hand and arm erythema/induration improved  Graciela colovesicular erythema improved  Skin: No new rashes or lesions  No draining wounds noted  Labs, Imaging, & Other studies:   All pertinent labs and imaging studies were personally reviewed  Results from last 7 days   Lab Units 21  1319 21  0655 21  0928   WBC Thousand/uL 4 72 5 91 7 35   HEMOGLOBIN g/dL 11 9 13 0 12 7   PLATELETS Thousands/uL 222 244 273     Results from last 7 days   Lab Units 21  1052 21  1319 21  0655 21  0928   SODIUM mmol/L 141 139 137 138   POTASSIUM mmol/L 4 2 4 4 5 1 3 7   CHLORIDE mmol/L 103 107 103 103   CO2 mmol/L 30 25 21 30   BUN mg/dL 9 18 17 9   CREATININE mg/dL 1 06 1 28 1 01 0 67   EGFR ml/min/1 73sq m 72 57 76 120   CALCIUM mg/dL 9 1 8 3 9 3 9 3   AST U/L  --   --  76* 40   ALT U/L  --   --  41 38   ALK PHOS U/L  --   --  79 84     Results from last 7 days   Lab Units 21  0928 21  0920   BLOOD CULTURE  No Growth After 4 Days  No Growth After 4 Days       Results from last 7 days   Lab Units 21  0655 21  0928   PROCALCITONIN ng/ml <0 05 <0 05

## 2021-03-21 NOTE — ASSESSMENT & PLAN NOTE
· Patient noncompliant with Eliquis, will utilize therapeutic Lovenox while admitted  · [facility]   Pt reports she unfortunately has no plan to take this medication  Stressed importance for her medication compliance with her mom in the room

## 2021-03-21 NOTE — ASSESSMENT & PLAN NOTE
· Retained needle in left neck, early phlegmon noted on CT scan    Site is improved per surgery w/o worsening s/sx  · Surgical consult noted, no removal planned at this time   · abx as above

## 2021-03-22 LAB
BACTERIA BLD CULT: NORMAL
BACTERIA BLD CULT: NORMAL

## 2021-05-11 ENCOUNTER — HOSPITAL ENCOUNTER (INPATIENT)
Facility: HOSPITAL | Age: 31
LOS: 3 days | Discharge: HOME | DRG: 383 | End: 2021-05-15
Attending: EMERGENCY MEDICINE | Admitting: INTERNAL MEDICINE
Payer: COMMERCIAL

## 2021-05-11 DIAGNOSIS — M79.5: ICD-10-CM

## 2021-05-11 DIAGNOSIS — L02.91 PHLEGMONOUS CELLULITIS: Primary | ICD-10-CM

## 2021-05-11 DIAGNOSIS — F31.9 BIPOLAR 1 DISORDER (HCC): ICD-10-CM

## 2021-05-11 DIAGNOSIS — Z91.89 AT RISK FOR ELOPEMENT FROM HEALTHCARE SETTING: ICD-10-CM

## 2021-05-11 DIAGNOSIS — Z86.718 HISTORY OF DVT (DEEP VEIN THROMBOSIS): ICD-10-CM

## 2021-05-11 DIAGNOSIS — F19.11 HISTORY OF DRUG ABUSE (HCC): ICD-10-CM

## 2021-05-11 DIAGNOSIS — L02.91 PHLEGMON: ICD-10-CM

## 2021-05-11 DIAGNOSIS — R00.1 BRADYCARDIA: ICD-10-CM

## 2021-05-11 LAB
ANION GAP SERPL CALCULATED.3IONS-SCNC: 2 MMOL/L (ref 4–13)
BASOPHILS # BLD AUTO: 0.02 THOUSANDS/ΜL (ref 0–0.1)
BASOPHILS NFR BLD AUTO: 0 % (ref 0–1)
BUN SERPL-MCNC: 8 MG/DL (ref 5–25)
CALCIUM SERPL-MCNC: 8.6 MG/DL (ref 8.3–10.1)
CHLORIDE SERPL-SCNC: 110 MMOL/L (ref 100–108)
CO2 SERPL-SCNC: 30 MMOL/L (ref 21–32)
CREAT SERPL-MCNC: 0.64 MG/DL (ref 0.6–1.3)
EOSINOPHIL # BLD AUTO: 0.36 THOUSAND/ΜL (ref 0–0.61)
EOSINOPHIL NFR BLD AUTO: 7 % (ref 0–6)
ERYTHROCYTE [DISTWIDTH] IN BLOOD BY AUTOMATED COUNT: 12.7 % (ref 11.6–15.1)
GFR SERPL CREATININE-BSD FRML MDRD: 122 ML/MIN/1.73SQ M
GLUCOSE SERPL-MCNC: 91 MG/DL (ref 65–140)
HCT VFR BLD AUTO: 34.1 % (ref 34.8–46.1)
HGB BLD-MCNC: 11.3 G/DL (ref 11.5–15.4)
IMM GRANULOCYTES # BLD AUTO: 0.01 THOUSAND/UL (ref 0–0.2)
IMM GRANULOCYTES NFR BLD AUTO: 0 % (ref 0–2)
LYMPHOCYTES # BLD AUTO: 2.37 THOUSANDS/ΜL (ref 0.6–4.47)
LYMPHOCYTES NFR BLD AUTO: 49 % (ref 14–44)
MCH RBC QN AUTO: 28.8 PG (ref 26.8–34.3)
MCHC RBC AUTO-ENTMCNC: 33.1 G/DL (ref 31.4–37.4)
MCV RBC AUTO: 87 FL (ref 82–98)
MONOCYTES # BLD AUTO: 0.37 THOUSAND/ΜL (ref 0.17–1.22)
MONOCYTES NFR BLD AUTO: 7 % (ref 4–12)
NEUTROPHILS # BLD AUTO: 1.86 THOUSANDS/ΜL (ref 1.85–7.62)
NEUTS SEG NFR BLD AUTO: 37 % (ref 43–75)
NRBC BLD AUTO-RTO: 0 /100 WBCS
PLATELET # BLD AUTO: 212 THOUSANDS/UL (ref 149–390)
PMV BLD AUTO: 8.9 FL (ref 8.9–12.7)
POTASSIUM SERPL-SCNC: 3.4 MMOL/L (ref 3.5–5.3)
RBC # BLD AUTO: 3.92 MILLION/UL (ref 3.81–5.12)
SODIUM SERPL-SCNC: 142 MMOL/L (ref 136–145)
WBC # BLD AUTO: 4.99 THOUSAND/UL (ref 4.31–10.16)

## 2021-05-11 PROCEDURE — 80048 BASIC METABOLIC PNL TOTAL CA: CPT | Performed by: EMERGENCY MEDICINE

## 2021-05-11 PROCEDURE — 99220 PR INITIAL OBSERVATION CARE/DAY 70 MINUTES: CPT | Performed by: INTERNAL MEDICINE

## 2021-05-11 PROCEDURE — 99285 EMERGENCY DEPT VISIT HI MDM: CPT

## 2021-05-11 PROCEDURE — 85025 COMPLETE CBC W/AUTO DIFF WBC: CPT | Performed by: EMERGENCY MEDICINE

## 2021-05-11 PROCEDURE — 36415 COLL VENOUS BLD VENIPUNCTURE: CPT | Performed by: EMERGENCY MEDICINE

## 2021-05-11 PROCEDURE — 99285 EMERGENCY DEPT VISIT HI MDM: CPT | Performed by: EMERGENCY MEDICINE

## 2021-05-11 RX ORDER — OXYCODONE HYDROCHLORIDE 5 MG/1
5 TABLET ORAL EVERY 4 HOURS PRN
Status: DISCONTINUED | OUTPATIENT
Start: 2021-05-11 | End: 2021-05-15

## 2021-05-11 RX ORDER — QUETIAPINE FUMARATE 25 MG/1
25 TABLET, FILM COATED ORAL
Status: DISCONTINUED | OUTPATIENT
Start: 2021-05-11 | End: 2021-05-15 | Stop reason: HOSPADM

## 2021-05-11 RX ORDER — ARIPIPRAZOLE 10 MG/1
10 TABLET ORAL DAILY
Status: DISCONTINUED | OUTPATIENT
Start: 2021-05-12 | End: 2021-05-15 | Stop reason: HOSPADM

## 2021-05-11 RX ORDER — ASPIRIN 81 MG/1
81 TABLET ORAL DAILY
Status: DISCONTINUED | OUTPATIENT
Start: 2021-05-12 | End: 2021-05-15 | Stop reason: HOSPADM

## 2021-05-11 RX ORDER — CLONAZEPAM 0.5 MG/1
0.5 TABLET ORAL 2 TIMES DAILY
Status: DISCONTINUED | OUTPATIENT
Start: 2021-05-12 | End: 2021-05-15 | Stop reason: HOSPADM

## 2021-05-11 RX ORDER — ACETAMINOPHEN 325 MG/1
650 TABLET ORAL EVERY 6 HOURS PRN
Status: DISCONTINUED | OUTPATIENT
Start: 2021-05-11 | End: 2021-05-15 | Stop reason: HOSPADM

## 2021-05-11 RX ORDER — HYDROMORPHONE HCL/PF 1 MG/ML
1 SYRINGE (ML) INJECTION EVERY 4 HOURS PRN
Status: DISCONTINUED | OUTPATIENT
Start: 2021-05-11 | End: 2021-05-15

## 2021-05-11 RX ORDER — ONDANSETRON 2 MG/ML
4 INJECTION INTRAMUSCULAR; INTRAVENOUS EVERY 4 HOURS PRN
Status: DISCONTINUED | OUTPATIENT
Start: 2021-05-11 | End: 2021-05-15 | Stop reason: HOSPADM

## 2021-05-11 RX ORDER — NICOTINE 21 MG/24HR
1 PATCH, TRANSDERMAL 24 HOURS TRANSDERMAL DAILY
Status: DISCONTINUED | OUTPATIENT
Start: 2021-05-12 | End: 2021-05-15 | Stop reason: HOSPADM

## 2021-05-11 RX ORDER — CLONIDINE HYDROCHLORIDE 0.1 MG/1
0.2 TABLET ORAL EVERY 12 HOURS SCHEDULED
Status: DISCONTINUED | OUTPATIENT
Start: 2021-05-11 | End: 2021-05-12

## 2021-05-11 RX ORDER — MAGNESIUM HYDROXIDE/ALUMINUM HYDROXICE/SIMETHICONE 120; 1200; 1200 MG/30ML; MG/30ML; MG/30ML
30 SUSPENSION ORAL EVERY 6 HOURS PRN
Status: DISCONTINUED | OUTPATIENT
Start: 2021-05-11 | End: 2021-05-15 | Stop reason: HOSPADM

## 2021-05-11 RX ORDER — SODIUM CHLORIDE, SODIUM GLUCONATE, SODIUM ACETATE, POTASSIUM CHLORIDE, MAGNESIUM CHLORIDE, SODIUM PHOSPHATE, DIBASIC, AND POTASSIUM PHOSPHATE .53; .5; .37; .037; .03; .012; .00082 G/100ML; G/100ML; G/100ML; G/100ML; G/100ML; G/100ML; G/100ML
125 INJECTION, SOLUTION INTRAVENOUS CONTINUOUS
Status: DISCONTINUED | OUTPATIENT
Start: 2021-05-11 | End: 2021-05-13

## 2021-05-11 RX ORDER — TEMAZEPAM 15 MG/1
15 CAPSULE ORAL
Status: DISCONTINUED | OUTPATIENT
Start: 2021-05-11 | End: 2021-05-15 | Stop reason: HOSPADM

## 2021-05-11 RX ORDER — DOCUSATE SODIUM 100 MG/1
100 CAPSULE, LIQUID FILLED ORAL 2 TIMES DAILY PRN
Status: DISCONTINUED | OUTPATIENT
Start: 2021-05-11 | End: 2021-05-13

## 2021-05-11 RX ADMIN — PIPERACILLIN AND TAZOBACTAM 4.5 G: 36; 4.5 INJECTION, POWDER, FOR SOLUTION INTRAVENOUS at 22:55

## 2021-05-11 RX ADMIN — QUETIAPINE FUMARATE 25 MG: 25 TABLET ORAL at 23:42

## 2021-05-11 RX ADMIN — CLONIDINE HYDROCHLORIDE 0.2 MG: 0.2 TABLET ORAL at 23:42

## 2021-05-11 RX ADMIN — SODIUM CHLORIDE, SODIUM GLUCONATE, SODIUM ACETATE, POTASSIUM CHLORIDE, MAGNESIUM CHLORIDE, SODIUM PHOSPHATE, DIBASIC, AND POTASSIUM PHOSPHATE 125 ML/HR: .53; .5; .37; .037; .03; .012; .00082 INJECTION, SOLUTION INTRAVENOUS at 23:56

## 2021-05-11 RX ADMIN — VANCOMYCIN HYDROCHLORIDE 1250 MG: 10 INJECTION, POWDER, LYOPHILIZED, FOR SOLUTION INTRAVENOUS at 23:31

## 2021-05-11 RX ADMIN — OXYCODONE HYDROCHLORIDE 5 MG: 5 TABLET ORAL at 23:38

## 2021-05-11 RX ADMIN — RALTEGRAVIR 400 MG: 400 TABLET, FILM COATED ORAL at 23:42

## 2021-05-12 ENCOUNTER — APPOINTMENT (OUTPATIENT)
Dept: NON INVASIVE DIAGNOSTICS | Facility: HOSPITAL | Age: 31
DRG: 383 | End: 2021-05-12
Payer: COMMERCIAL

## 2021-05-12 PROBLEM — M54.2 ACUTE NECK PAIN: Status: ACTIVE | Noted: 2021-05-12

## 2021-05-12 PROBLEM — M79.5: Status: ACTIVE | Noted: 2021-05-12

## 2021-05-12 PROBLEM — R00.1 BRADYCARDIA: Status: ACTIVE | Noted: 2021-05-12

## 2021-05-12 LAB
ALBUMIN SERPL BCP-MCNC: 2.9 G/DL (ref 3.5–5)
ALP SERPL-CCNC: 66 U/L (ref 46–116)
ALT SERPL W P-5'-P-CCNC: 36 U/L (ref 12–78)
ANION GAP SERPL CALCULATED.3IONS-SCNC: 3 MMOL/L (ref 4–13)
AST SERPL W P-5'-P-CCNC: 26 U/L (ref 5–45)
ATRIAL RATE: 277 BPM
BACTERIA UR QL AUTO: ABNORMAL /HPF
BASOPHILS # BLD AUTO: 0.01 THOUSANDS/ΜL (ref 0–0.1)
BASOPHILS NFR BLD AUTO: 0 % (ref 0–1)
BILIRUB SERPL-MCNC: 0.3 MG/DL (ref 0.2–1)
BILIRUB UR QL STRIP: NEGATIVE
BUN SERPL-MCNC: 7 MG/DL (ref 5–25)
CALCIUM ALBUM COR SERPL-MCNC: 8.9 MG/DL (ref 8.3–10.1)
CALCIUM SERPL-MCNC: 8 MG/DL (ref 8.3–10.1)
CHLORIDE SERPL-SCNC: 115 MMOL/L (ref 100–108)
CLARITY UR: CLEAR
CO2 SERPL-SCNC: 26 MMOL/L (ref 21–32)
COLOR UR: YELLOW
CREAT SERPL-MCNC: 0.68 MG/DL (ref 0.6–1.3)
EOSINOPHIL # BLD AUTO: 0.43 THOUSAND/ΜL (ref 0–0.61)
EOSINOPHIL NFR BLD AUTO: 7 % (ref 0–6)
ERYTHROCYTE [DISTWIDTH] IN BLOOD BY AUTOMATED COUNT: 12.9 % (ref 11.6–15.1)
GFR SERPL CREATININE-BSD FRML MDRD: 119 ML/MIN/1.73SQ M
GLUCOSE SERPL-MCNC: 102 MG/DL (ref 65–140)
GLUCOSE UR STRIP-MCNC: NEGATIVE MG/DL
HCT VFR BLD AUTO: 31.1 % (ref 34.8–46.1)
HGB BLD-MCNC: 10.5 G/DL (ref 11.5–15.4)
HGB UR QL STRIP.AUTO: ABNORMAL
HYALINE CASTS #/AREA URNS LPF: ABNORMAL /LPF
IMM GRANULOCYTES # BLD AUTO: 0.01 THOUSAND/UL (ref 0–0.2)
IMM GRANULOCYTES NFR BLD AUTO: 0 % (ref 0–2)
KETONES UR STRIP-MCNC: NEGATIVE MG/DL
LEUKOCYTE ESTERASE UR QL STRIP: NEGATIVE
LYMPHOCYTES # BLD AUTO: 3.17 THOUSANDS/ΜL (ref 0.6–4.47)
LYMPHOCYTES NFR BLD AUTO: 48 % (ref 14–44)
MAGNESIUM SERPL-MCNC: 2.4 MG/DL (ref 1.6–2.6)
MCH RBC QN AUTO: 29.7 PG (ref 26.8–34.3)
MCHC RBC AUTO-ENTMCNC: 33.8 G/DL (ref 31.4–37.4)
MCV RBC AUTO: 88 FL (ref 82–98)
MONOCYTES # BLD AUTO: 0.42 THOUSAND/ΜL (ref 0.17–1.22)
MONOCYTES NFR BLD AUTO: 7 % (ref 4–12)
NEUTROPHILS # BLD AUTO: 2.43 THOUSANDS/ΜL (ref 1.85–7.62)
NEUTS SEG NFR BLD AUTO: 38 % (ref 43–75)
NITRITE UR QL STRIP: NEGATIVE
NON-SQ EPI CELLS URNS QL MICRO: ABNORMAL /HPF
NRBC BLD AUTO-RTO: 0 /100 WBCS
PH UR STRIP.AUTO: 7.5 [PH]
PHOSPHATE SERPL-MCNC: 3.8 MG/DL (ref 2.7–4.5)
PLATELET # BLD AUTO: 200 THOUSANDS/UL (ref 149–390)
PMV BLD AUTO: 8.9 FL (ref 8.9–12.7)
POTASSIUM SERPL-SCNC: 3.5 MMOL/L (ref 3.5–5.3)
PROCALCITONIN SERPL-MCNC: <0.05 NG/ML
PROT SERPL-MCNC: 6 G/DL (ref 6.4–8.2)
PROT UR STRIP-MCNC: NEGATIVE MG/DL
QRS AXIS: 83 DEGREES
QRSD INTERVAL: 70 MS
QT INTERVAL: 566 MS
QTC INTERVAL: 444 MS
RBC # BLD AUTO: 3.53 MILLION/UL (ref 3.81–5.12)
RBC #/AREA URNS AUTO: ABNORMAL /HPF
SODIUM SERPL-SCNC: 144 MMOL/L (ref 136–145)
SP GR UR STRIP.AUTO: 1.02 (ref 1–1.03)
T WAVE AXIS: 81 DEGREES
UROBILINOGEN UR QL STRIP.AUTO: 1 E.U./DL
VENTRICULAR RATE: 37 BPM
WBC # BLD AUTO: 6.47 THOUSAND/UL (ref 4.31–10.16)
WBC #/AREA URNS AUTO: ABNORMAL /HPF

## 2021-05-12 PROCEDURE — 81001 URINALYSIS AUTO W/SCOPE: CPT | Performed by: INTERNAL MEDICINE

## 2021-05-12 PROCEDURE — 36415 COLL VENOUS BLD VENIPUNCTURE: CPT | Performed by: INTERNAL MEDICINE

## 2021-05-12 PROCEDURE — 84145 PROCALCITONIN (PCT): CPT | Performed by: INTERNAL MEDICINE

## 2021-05-12 PROCEDURE — 99232 SBSQ HOSP IP/OBS MODERATE 35: CPT | Performed by: NURSE PRACTITIONER

## 2021-05-12 PROCEDURE — 99222 1ST HOSP IP/OBS MODERATE 55: CPT | Performed by: INTERNAL MEDICINE

## 2021-05-12 PROCEDURE — 85025 COMPLETE CBC W/AUTO DIFF WBC: CPT | Performed by: INTERNAL MEDICINE

## 2021-05-12 PROCEDURE — 84100 ASSAY OF PHOSPHORUS: CPT | Performed by: INTERNAL MEDICINE

## 2021-05-12 PROCEDURE — 93005 ELECTROCARDIOGRAM TRACING: CPT

## 2021-05-12 PROCEDURE — 83735 ASSAY OF MAGNESIUM: CPT | Performed by: INTERNAL MEDICINE

## 2021-05-12 PROCEDURE — 99254 IP/OBS CNSLTJ NEW/EST MOD 60: CPT | Performed by: INTERNAL MEDICINE

## 2021-05-12 PROCEDURE — 93308 TTE F-UP OR LMTD: CPT

## 2021-05-12 PROCEDURE — 99254 IP/OBS CNSLTJ NEW/EST MOD 60: CPT | Performed by: SURGERY

## 2021-05-12 PROCEDURE — 93325 DOPPLER ECHO COLOR FLOW MAPG: CPT | Performed by: INTERNAL MEDICINE

## 2021-05-12 PROCEDURE — 93321 DOPPLER ECHO F-UP/LMTD STD: CPT | Performed by: INTERNAL MEDICINE

## 2021-05-12 PROCEDURE — 93308 TTE F-UP OR LMTD: CPT | Performed by: INTERNAL MEDICINE

## 2021-05-12 PROCEDURE — 93010 ELECTROCARDIOGRAM REPORT: CPT | Performed by: INTERNAL MEDICINE

## 2021-05-12 PROCEDURE — 80053 COMPREHEN METABOLIC PANEL: CPT | Performed by: INTERNAL MEDICINE

## 2021-05-12 RX ORDER — GABAPENTIN 300 MG/1
300 CAPSULE ORAL 2 TIMES DAILY
Status: DISCONTINUED | OUTPATIENT
Start: 2021-05-12 | End: 2021-05-15 | Stop reason: HOSPADM

## 2021-05-12 RX ORDER — OXYCODONE HYDROCHLORIDE 10 MG/1
10 TABLET ORAL EVERY 4 HOURS PRN
Status: DISCONTINUED | OUTPATIENT
Start: 2021-05-12 | End: 2021-05-15

## 2021-05-12 RX ORDER — METHOCARBAMOL 750 MG/1
750 TABLET, FILM COATED ORAL EVERY 6 HOURS SCHEDULED
Status: DISCONTINUED | OUTPATIENT
Start: 2021-05-12 | End: 2021-05-15 | Stop reason: HOSPADM

## 2021-05-12 RX ADMIN — SODIUM CHLORIDE, SODIUM GLUCONATE, SODIUM ACETATE, POTASSIUM CHLORIDE, MAGNESIUM CHLORIDE, SODIUM PHOSPHATE, DIBASIC, AND POTASSIUM PHOSPHATE 125 ML/HR: .53; .5; .37; .037; .03; .012; .00082 INJECTION, SOLUTION INTRAVENOUS at 12:52

## 2021-05-12 RX ADMIN — APIXABAN 5 MG: 5 TABLET, FILM COATED ORAL at 11:27

## 2021-05-12 RX ADMIN — EMTRICITABINE: 200 CAPSULE ORAL at 11:25

## 2021-05-12 RX ADMIN — RALTEGRAVIR 400 MG: 400 TABLET, FILM COATED ORAL at 11:24

## 2021-05-12 RX ADMIN — QUETIAPINE FUMARATE 25 MG: 25 TABLET ORAL at 22:16

## 2021-05-12 RX ADMIN — GABAPENTIN 300 MG: 300 CAPSULE ORAL at 18:45

## 2021-05-12 RX ADMIN — HYDROMORPHONE HYDROCHLORIDE 1 MG: 1 INJECTION, SOLUTION INTRAMUSCULAR; INTRAVENOUS; SUBCUTANEOUS at 00:43

## 2021-05-12 RX ADMIN — OXYCODONE HYDROCHLORIDE 10 MG: 10 TABLET ORAL at 22:16

## 2021-05-12 RX ADMIN — CLONAZEPAM 0.5 MG: 1 TABLET ORAL at 11:39

## 2021-05-12 RX ADMIN — OXYCODONE HYDROCHLORIDE 5 MG: 5 TABLET ORAL at 11:39

## 2021-05-12 RX ADMIN — METHOCARBAMOL TABLETS 750 MG: 750 TABLET, COATED ORAL at 23:29

## 2021-05-12 RX ADMIN — OXYCODONE HYDROCHLORIDE 10 MG: 10 TABLET ORAL at 16:45

## 2021-05-12 RX ADMIN — APIXABAN 5 MG: 5 TABLET, FILM COATED ORAL at 16:46

## 2021-05-12 RX ADMIN — CLONAZEPAM 0.5 MG: 1 TABLET ORAL at 16:47

## 2021-05-12 RX ADMIN — HYDROMORPHONE HYDROCHLORIDE 1 MG: 1 INJECTION, SOLUTION INTRAMUSCULAR; INTRAVENOUS; SUBCUTANEOUS at 09:29

## 2021-05-12 RX ADMIN — VANCOMYCIN HYDROCHLORIDE 1250 MG: 10 INJECTION, POWDER, LYOPHILIZED, FOR SOLUTION INTRAVENOUS at 12:51

## 2021-05-12 RX ADMIN — PIPERACILLIN AND TAZOBACTAM 4.5 G: 36; 4.5 INJECTION, POWDER, FOR SOLUTION INTRAVENOUS at 09:30

## 2021-05-12 RX ADMIN — METHOCARBAMOL TABLETS 750 MG: 750 TABLET, COATED ORAL at 18:45

## 2021-05-12 RX ADMIN — ARIPIPRAZOLE 10 MG: 10 TABLET ORAL at 16:45

## 2021-05-12 RX ADMIN — OXYCODONE HYDROCHLORIDE 5 MG: 5 TABLET ORAL at 06:05

## 2021-05-12 NOTE — ED ATTENDING ATTESTATION
5/11/2021  Regina Higgins DO, saw and evaluated the patient  I have discussed the patient with the resident/non-physician practitioner and agree with the resident's/non-physician practitioner's findings, Plan of Care, and MDM as documented in the resident's/non-physician practitioner's note, except where noted  All available labs and Radiology studies were reviewed  I was present for key portions of any procedure(s) performed by the resident/non-physician practitioner and I was immediately available to provide assistance  At this point I agree with the current assessment done in the Emergency Department  I have conducted an independent evaluation of this patient a history and physical is as follows:    30 yo female being treated w/abx for neck infection, possible mediastinitis at [address] [place] [address]  Left AMA due to perceived inadequate pain control  This occurred 6 hours ago  She has no new symptoms at this time  Requests admission for continued abx here  Records from [address] [place] [address] reviewed  Note from 5/11/21 at 46 - dr Reymundo Mohan (ID) pt getting zosyn 3 375g IV q8h, and vancomycin 15mg/kg IV q12h    Will obtain CBC, BMP  Start zosyn, vanc at doses above  Admit to medicine for further tx, possible ID and ENT consultation for further recs        ED Course         Critical Care Time  Procedures

## 2021-05-12 NOTE — ED PROVIDER NOTES
History  Chief Complaint   Patient presents with    Blood Infection     Pt w/ PMHx of IVDA presents c/o pain in L arm and neck pain  Pt recently seen at [facility] and told she has "multiple fatal clots" in neck and arm  Pt signed out AMA and states, "I never should have left " Pt c/o blurry vision at this time  A&Ox4  PERRLA 3mm in triage  The patient is a 22-year-old female with a past medical history of hepatitis-C, IVDA, history of MRSA bacteremia, history of bacterial endocarditis tricuspid status post annuloplasty, history of DVT of the axillary vein on Eliquis, who presents the hospital for evaluation, requesting admission IV antibiotics  The patient was at [facility] and admitted on 5/0 9, she left AMA this afternoon and came to this emergency department  She states she left AMA because "they were very nice to me and I was unhappy with the care I was getting  And they were giving me morphine, which does not make any sense "  She was admitted for phlegmon of the neck, secondary to IV drug injection into her neck  She has retained foreign body of the neck, a hypodermic needle, was admitted to [facility] in March and was treated with IV antibiotics, surgery was consulted  Patient was then discharged on an oral course of antibiotics, which she failed to complete, and was also discharged on Eliquis for DVT, which she was also noncompliant with  She then presented to [facility] for worsening neck pain, had a CT scan which revealed phlegmon, gas in the subcutaneous tissues  She was admitted with ENT and Infectious Disease consult, was initially on Unasyn and vancomycin in the was transition to Zosyn and vancomycin  Infectious Disease at [facility] recommended 72 hours of IV antibiotics  Patient was in the hospital for 48 hours, and then left AMA today because she was unhappy    She denies any worsening symptoms at this time, she has chronic pain and complains of pain that is currently at baseline, no difficulty swallowing, was able to eat and drink today  No chest pain or shortness of breath  No fevers  In review of the chart, blood cultures were reportedly negative at [facility] deficit  History provided by:  Patient   used: No        Prior to Admission Medications   Prescriptions Last Dose Informant Patient Reported? Taking? ARIPiprazole (ABILIFY) 10 mg tablet   Yes No   Sig: Take 10 mg by mouth daily Dosage unknown   QUEtiapine (SEROquel) 25 mg tablet   No No   Sig: Take 1 tablet (25 mg total) by mouth daily at bedtime   apixaban (ELIQUIS) 5 mg   No No   Sig: Take 2 tablets (10 mg total) by mouth 2 (two) times a day for 7 days, THEN 1 tablet (5 mg total) 2 (two) times a day for 23 days     aspirin (ECOTRIN LOW STRENGTH) 81 mg EC tablet   No No   Sig: Take 1 tablet (81 mg total) by mouth daily   cloNIDine (CATAPRES) 0 2 mg tablet   No No   Sig: Take 1 tablet (0 2 mg total) by mouth every 12 (twelve) hours   clonazePAM (KlonoPIN) 0 5 mg tablet   No No   Sig: Take 1 tablet (0 5 mg total) by mouth 2 (two) times a day for 6 doses   emtricitabine-tenofovir (TRUVADA) 200-300 mg per tablet   No No   Sig: Take 1 tablet by mouth daily   nicotine (NICODERM CQ) 14 mg/24hr TD 24 hr patch   No No   Sig: Place 1 patch on the skin daily   ondansetron (ZOFRAN-ODT) 4 mg disintegrating tablet   No No   Sig: Take 1 tablet (4 mg total) by mouth every 6 (six) hours as needed for nausea or vomiting   raltegravir (ISENTRESS) 400 mg tablet   No No   Sig: Take 1 tablet (400 mg total) by mouth 2 (two) times a day      Facility-Administered Medications: None       Past Medical History:   Diagnosis Date    Abnormal Pap smear of cervix     Anxiety     Depression     Endocarditis     2018    Hepatitis C     HPV (human papilloma virus) anogenital infection        Past Surgical History:   Procedure Laterality Date    IR PICC PLACEMENT DOUBLE LUMEN  8/26/2020    KNEE SURGERY Left     MOUTH SURGERY      GA REPLACE TRICUSPID W CP BYPASS N/A 9/10/2020    Procedure: REPAIR VALVE TRICUSPID with Medtronic 30mm 3D Contour  Annuloplasty Ring;  Surgeon: Jhon Marshall MD;  [facility];  Service: Cardiac Surgery    TOOTH EXTRACTION N/A 2020    Procedure: EXTRACTION TOOTH 32;  Surgeon: Alana Swift DMD;  [facility];  Service: Maxillofacial       History reviewed  No pertinent family history  I have reviewed and agree with the history as documented  E-Cigarette/Vaping    E-Cigarette Use Current Every Day User      E-Cigarette/Vaping Substances    Nicotine Yes      Social History     Tobacco Use    Smoking status: Current Every Day Smoker     Packs/day: 0 50     Types: Cigarettes     Last attempt to quit: 2016     Years since quittin 5    Smokeless tobacco: Never Used   Substance Use Topics    Alcohol use: Not Currently     Alcohol/week: 0 0 standard drinks     Frequency: Monthly or less     Binge frequency: Never    Drug use: Yes     Types: Heroin, Marijuana, Benzodiazepines, Cocaine, Methamphetamines        Review of Systems   Constitutional: Negative  Negative for chills and fever  HENT: Negative  Negative for trouble swallowing and voice change  Respiratory: Negative  Negative for shortness of breath  Cardiovascular: Negative  Negative for chest pain  Musculoskeletal: Positive for myalgias  All other systems reviewed and are negative  Physical Exam  ED Triage Vitals [21]   Temperature Pulse Respirations Blood Pressure SpO2   98 °F (36 7 °C) (!) 44 18 148/87 100 %      Temp Source Heart Rate Source Patient Position - Orthostatic VS BP Location FiO2 (%)   Oral Monitor Sitting Left arm --      Pain Score       Worst Possible Pain             Orthostatic Vital Signs  Vitals:    21   BP: 148/87   Pulse: (!) 44   Patient Position - Orthostatic VS: Sitting       Physical Exam  Vitals signs reviewed     Constitutional: Appearance: She is well-developed  She is not diaphoretic  HENT:      Head: Normocephalic and atraumatic  Right Ear: External ear normal       Left Ear: External ear normal       Nose: Nose normal    Eyes:      General: No scleral icterus  Conjunctiva/sclera: Conjunctivae normal    Neck:      Musculoskeletal: Normal range of motion  Vascular: No JVD  Trachea: Trachea and phonation normal  No tracheal deviation  Comments: Ecchymoses anterior neck  Trachea is midline, phonation normal, patient tolerating secretions  Normal range of motion of the neck  Cardiovascular:      Rate and Rhythm: Normal rate  Pulmonary:      Effort: Pulmonary effort is normal  No respiratory distress  Abdominal:      General: Abdomen is flat  There is no distension  Musculoskeletal: Normal range of motion  Skin:     General: Skin is warm and dry  Capillary Refill: Capillary refill takes less than 2 seconds  Neurological:      Mental Status: She is alert and oriented to person, place, and time  Motor: No abnormal muscle tone     Psychiatric:         Behavior: Behavior normal          ED Medications  Medications   vancomycin (VANCOCIN) 1,250 mg in sodium chloride 0 9 % 250 mL IVPB (has no administration in time range)   piperacillin-tazobactam (ZOSYN) 4 5 g in sodium chloride 0 9 % 100 mL IVPB (has no administration in time range)       Diagnostic Studies  Results Reviewed     Procedure Component Value Units Date/Time    CBC and differential [[id number]]     Lab Status: No result Specimen: Blood     Basic metabolic panel [[id number]]     Lab Status: No result Specimen: Blood                  No orders to display         Procedures  Procedures      ED Course                                       MDM  Number of Diagnoses or Management Options  At risk for elopement from healthcare setting: new and does not require workup  History of drug abuse ([facility] [address] ): new and does not require workup  History of DVT (deep vein thrombosis): new and does not require workup  Phlegmonous cellulitis: new and does not require workup  Diagnosis management comments: 53-year-old female presenting to the ED after leaving AMA at [facility] today, where she was admitted for phlegmonous cellulitis  Unclear why she was in [place], but states she came back because she was unhappy with her care, she was concerned about her vehicle  She does have a record of poor medical compliance and leaves AMA at times  In review of the chart, ENT and ID were consulted at [facility], recommended 72 hours of Zosyn and vancomycin  Patient needs 24 hours of IV antibiotics, and observation to ensure abscess is not developing  Will admit for IV antibiotics         Amount and/or Complexity of Data Reviewed  Clinical lab tests: ordered  Tests in the radiology section of CPT®: reviewed  Review and summarize past medical records: yes  Independent visualization of images, tracings, or specimens: yes        Disposition  Final diagnoses:   Phlegmonous cellulitis   [facility] )   At risk for elopement from healthcare setting   History of DVT (deep vein thrombosis)     Time reflects when diagnosis was documented in both MDM as applicable and the Disposition within this note     Time User Action Codes Description Comment    5/11/2021 10:33 PM Giles Garg Add [W03 405] Phlegmonous dacryocystitis, right     5/11/2021 10:34 PM Chay Duck S Add [L02 91] Phlegmon     5/11/2021 10:34 PM Giles Garg Remove [Y92 584] Phlegmonous dacryocystitis, right     5/11/2021 10:38 PM Elonda Letters Add [L02 91] Phlegmonous cellulitis     5/11/2021 10:38 PM Elonda Letters Modify [L02 91] Phlegmon     5/11/2021 10:38 PM Tunde Sammyy [L02 91] Phlegmonous cellulitis     5/11/2021 10:38 PM Elonda Letters Add [F19 11] History of drug abuse ([facility] )     5/11/2021 10:39 PM Christa Spar [Z91 89] At risk for elopement from healthcare setting     5/11/2021 10:39 PM Deepika Bullock [I70 363] History of DVT (deep vein thrombosis)     5/11/2021 10:40 PM Deepika Bullock [Z91 89] At risk for poor quality of life     5/11/2021 10:40 PM Linda Alvarado [Z91 89] At risk for poor quality of life       ED Disposition     ED Disposition Condition Date/Time Comment    Admit Stable Tue May 11, 2021 10:40 PM Case was discussed with erica and the patient's admission status was agreed to be Admission Status: inpatient status to the service of Dr Marycruz Graves   Follow-up Information    None         Patient's Medications   Discharge Prescriptions    No medications on file     No discharge procedures on file  PDMP Review       Value Time User    PDMP Reviewed  Yes 3/21/2021  1:32 PM Yanely Hernandez PA-C           ED Provider  Attending physically available and evaluated Odalis Charles I managed the patient along with the ED Attending      Electronically Signed by         Bobby Aguirre DO  05/11/21 5834

## 2021-05-12 NOTE — ASSESSMENT & PLAN NOTE
· With history of IVDA  · Case management consulted; assistance appreciated with dispo   · Denies any cocaine or IVDU since prior hospitalization

## 2021-05-12 NOTE — CONSULTS
Consultation - Cardiology   Estrella Clarke 31 y o  female MRN: [id number]  Unit/Bed#: ED 29 Encounter: [id number]      Assessment and Plan:  Principal Problem:    Phlegmon  Active Problems:    Insomnia    Bipolar 1 disorder (HCC)    Intravenous drug abuse (HCC)    Chronic anticoagulation    Bradycardia    # bradycardia  - heart rate in 30s to 40s with blood pressure ranging from 90s to 140s   -EKG with AV dissociations with junctional escape   - She complains of tiredness but denies lightheadedness, presyncope or syncope  - She did receive a dose of clonidine last night which slows the heart rate  - No indication of pacemaker at this time    # phlegmon  - ID consulted  - CT scan showed phlegmon retained IV needle in the neck    # IVDU  - used recent cocaine    #Type 1 bipolar disorder  - patient on Abilify, Klonopin and Seroquel      History of Present Illness   Physician Requesting Consult: Niru Pedraza MD  Reason for Consult / Principal Problem: Bradycardia  HPI: Estrella Clarke is a 31y o  year old female who presents to the hospital with worsening neck pain  Patient was recently in at [facility] for the same but left AMA  She has a hx of tricuspid valve repair last year for right sided endocarditis  She has since been in and out of the hospital multiple times for various infections but has hx of leaving AMA  Cardiology was consulted for bradycardia  She has noticed low heart rates from before coming to the hospital  She does feel tired recently but she denies any lightheadedness, syncope or presyncope             Inpatient consult to Cardiology     Performed by  Lindsay Stevens MD     Authorized by Rosenda Charles MD              Review of Systems:  Review of Systems  All negative except as mentioned above    Historical Information   Past Medical History:   Diagnosis Date    Abnormal Pap smear of cervix     Anxiety     Depression     Endocarditis     2018    Hepatitis C     HPV (human papilloma virus) anogenital infection      Past Surgical History:   Procedure Laterality Date    IR PICC PLACEMENT DOUBLE LUMEN  2020    KNEE SURGERY Left     MOUTH SURGERY      OR REPLACE TRICUSPID W CP BYPASS N/A 9/10/2020    Procedure: REPAIR VALVE TRICUSPID with Medtronic 30mm 3D Contour  Annuloplasty Ring;  Surgeon: Trever Patiño MD;  [facility];  Service: Cardiac Surgery    TOOTH EXTRACTION N/A 2020    Procedure: EXTRACTION TOOTH 28;  Surgeon: Elvin Shaw DMD;  [facility];  Service: Maxillofacial     Social History     Substance and Sexual Activity   Alcohol Use Not Currently    Alcohol/week: 0 0 standard drinks    Frequency: Monthly or less    Binge frequency: Never     Social History     Substance and Sexual Activity   Drug Use Yes    Types: Heroin, Marijuana, Benzodiazepines, Cocaine, Methamphetamines     Social History     Tobacco Use   Smoking Status Current Every Day Smoker    Packs/day: 0 50    Types: Cigarettes    Last attempt to quit: 2016    Years since quittin 5   Smokeless Tobacco Never Used     Family History: non-contributory    Meds/Allergies   all current active meds have been reviewed  Allergies   Allergen Reactions    Cat Hair Extract     Dog Epithelium     Latex     Pollen Extract        Objective   Vitals: Blood pressure 94/52, pulse (!) 38, temperature 98 °F (36 7 °C), temperature source Oral, resp   rate 18, weight 64 9 kg (143 lb), SpO2 98 %, not currently breastfeeding , Body mass index is 23 8 kg/m² ,   Orthostatic Blood Pressures      Most Recent Value   Blood Pressure  94/52 filed at 2021 0515   Patient Position - Orthostatic VS  Lying filed at 2021 0515            Intake/Output Summary (Last 24 hours) at 2021 0846  Last data filed at 2021 0515  Gross per 24 hour   Intake 286 67 ml   Output --   Net 286 67 ml       Invasive Devices     Peripheral Intravenous Line            Peripheral IV 21 Left Arm less than 1 day                    Physical Exam:  Physical Exam  General: alert, oriented X 3 , comfortable  Neck: No JVD  Cardiac: normal S1, S2, no m/r/g  Respiratory: normal breath sounds, no wheezes or crackles  Abdomen: soft and non-tender  Extremities: warm, no cyanosis, no lower extremity edema, track marks in her arm      Lab Results:     Lab Results   Component Value Date    CKTOTAL 27 09/09/2020    TROPONINI 1 19 (H) 03/17/2021    TROPONINI 1 46 (H) 03/17/2021    TROPONINI 2 22 (H) 03/17/2021       Lab Results   Component Value Date    GLUCOSE 88 09/10/2020    CALCIUM 8 0 (L) 05/12/2021    K 3 5 05/12/2021    CO2 26 05/12/2021     (H) 05/12/2021    BUN 7 05/12/2021    CREATININE 0 68 05/12/2021       Lab Results   Component Value Date    WBC 6 47 05/12/2021    HGB 10 5 (L) 05/12/2021    HCT 31 1 (L) 05/12/2021    MCV 88 05/12/2021     05/12/2021       No results found for: CHOL  Lab Results   Component Value Date    HDL 45 09/09/2020     Lab Results   Component Value Date    LDLCALC 24 09/09/2020     Lab Results   Component Value Date    TRIG 266 (H) 09/09/2020       Lab Results   Component Value Date    ALT 36 05/12/2021    AST 26 05/12/2021               Imaging: I have personally reviewed pertinent reports        EKG: Junctional escape

## 2021-05-12 NOTE — ASSESSMENT & PLAN NOTE
· Continue Abilify 10 mg daily  · Klonopin 0 5 mg twice daily  · Seroquel 25 mg HS  · Supportive care

## 2021-05-12 NOTE — CONSULTS
Consultation - General Surgery   Odalis Charles 31 y o  female MRN: [id number]  Unit/Bed#: [facility] 834-01 Encounter: [id number]    Assessment/Plan     Assessment:  Foreign body in neck  History of bradycardia  Insomnia  Bipolar 1 disorder  IV Drug abuse  Chronic anticoagulation  Acute Neck Pain    Plan:  Nothing to do at this time, patient wants to leave and go to ER  Was brought back to the floor , will discuss with Attending  Pain control per APS  DVT prophylaxis  Discuss with attending, will ask Medicine to obtain records from [place]  If unable to get scans then would consider re-scanning her to see if needle migrated  Leonardo Auguste History of Present Illness   History, ROS and PFSH unobtainable from current medical records  HPI:  Odalis Charles is a 31 y o  female who presents with neck pain, Surgery consulted for foreign body in neck which she has been seen for before, back in March 2021, by us  Reportedly left [facility] AMA yesterday, not complaining of pain over needle site but rather over anterior bruising  No other complaints at this time  Consults    Review of Systems   Constitutional: Negative  HENT: Negative  Eyes: Negative  Respiratory: Negative  Cardiovascular: Negative  Gastrointestinal: Negative  Endocrine: Negative  Genitourinary: Negative  Musculoskeletal: Positive for neck pain  Neck swelling and bruising, patient verbalized she does not know where bruising came from   Skin: Negative  Allergic/Immunologic: Negative  Neurological: Negative  Hematological: Negative      Psychiatric/Behavioral:        Irritable       Historical Information   Past Medical History:   Diagnosis Date    Abnormal Pap smear of cervix     Anxiety     Depression     Endocarditis     2018    Hepatitis C     HPV (human papilloma virus) anogenital infection      Past Surgical History:   Procedure Laterality Date    IR PICC PLACEMENT DOUBLE LUMEN  8/26/2020    KNEE SURGERY Left     MOUTH SURGERY      MS REPLACE TRICUSPID W CP BYPASS N/A 9/10/2020    Procedure: REPAIR VALVE TRICUSPID with Medtronic 30mm 3D Contour  Annuloplasty Ring;  Surgeon: Jhon Markham MD;  [facility];  Service: Cardiac Surgery    TOOTH EXTRACTION N/A 2020    Procedure: EXTRACTION TOOTH 32;  Surgeon: Miguel Sykes DMD;  [facility];  Service: Maxillofacial     Social History   Social History     Substance and Sexual Activity   Alcohol Use Not Currently    Alcohol/week: 0 0 standard drinks    Frequency: Monthly or less    Binge frequency: Never     Social History     Substance and Sexual Activity   Drug Use Yes    Types: Heroin, Marijuana, Benzodiazepines, Cocaine, Methamphetamines     E-Cigarette/Vaping    E-Cigarette Use Current Every Day User      E-Cigarette/Vaping Substances    Nicotine Yes      Social History     Tobacco Use   Smoking Status Current Every Day Smoker    Packs/day: 0 50    Types: Cigarettes    Last attempt to quit: 2016    Years since quittin 5   Smokeless Tobacco Never Used     Family History: non-contributory    Meds/Allergies     Allergies   Allergen Reactions    Cat Hair Extract     Dog Epithelium     Latex     Pollen Extract        Objective   First Vitals:   Blood Pressure: 148/87 (21)  Pulse: (!) 44 (21)  Temperature: 98 °F (36 7 °C) (21)  Temp Source: Oral (21 2865)  Respirations: 18 (21)  Height: 5' 5" (165 1 cm) (21)  Weight - Scale: 64 9 kg (143 lb) (21)  SpO2: 100 % (21)    Current Vitals:   Blood Pressure: 96/52 (21)  Pulse: (!) 40 (21)  Temperature: (!) 97 3 °F (36 3 °C) (21)  Temp Source: Oral (21)  Respirations: 16 (21)  Height: 5' 5" (165 1 cm) (21)  Weight - Scale: 63 5 kg (140 lb) (21)  SpO2: 96 % (21)      Intake/Output Summary (Last 24 hours) at 5/12/2021 1526  Last data filed at 5/12/2021 0515  Gross per 24 hour   Intake 286 67 ml   Output --   Net 286 67 ml       Invasive Devices     Peripheral Intravenous Line            Peripheral IV 05/12/21 Upper Arm less than 1 day                Physical Exam  Constitutional:       Appearance: Normal appearance  HENT:      Head: Normocephalic  Nose: Nose normal    Neck:      Musculoskeletal: Normal range of motion and neck supple  Muscular tenderness present  Cardiovascular:      Rate and Rhythm: Bradycardia present  Pulmonary:      Effort: Pulmonary effort is normal    Abdominal:      General: Abdomen is flat  Genitourinary:     Comments: deferred  Skin:     General: Skin is warm  Capillary Refill: Capillary refill takes less than 2 seconds  Neurological:      Mental Status: She is alert and oriented to person, place, and time  Psychiatric:      Comments: Did not have an opportunity to fully examine patient prior to her leaving to go to the ER         Lab Results: Results for [facility] (MRN [id number]) as of 5/12/2021 15:26   Ref   Range 5/12/2021 06:15   Sodium Latest Ref Range: 136 - 145 mmol/L 144   Potassium Latest Ref Range: 3 5 - 5 3 mmol/L 3 5   Chloride Latest Ref Range: 100 - 108 mmol/L 115 (H)   CO2 Latest Ref Range: 21 - 32 mmol/L 26   Anion Gap Latest Ref Range: 4 - 13 mmol/L 3 (L)   BUN Latest Ref Range: 5 - 25 mg/dL 7   Creatinine Latest Ref Range: 0 60 - 1 30 mg/dL 0 68   Glucose, Random Latest Ref Range: 65 - 140 mg/dL 102   Calcium Latest Ref Range: 8 3 - 10 1 mg/dL 8 0 (L)   CORRECTED CALCIUM Latest Ref Range: 8 3 - 10 1 mg/dL 8 9   AST Latest Ref Range: 5 - 45 U/L 26   ALT Latest Ref Range: 12 - 78 U/L 36   Alkaline Phosphatase Latest Ref Range: 46 - 116 U/L 66   Total Protein Latest Ref Range: 6 4 - 8 2 g/dL 6 0 (L)   Albumin Latest Ref Range: 3 5 - 5 0 g/dL 2 9 (L)   TOTAL BILIRUBIN Latest Ref Range: 0 20 - 1 00 mg/dL 0 30   eGFR Latest Units: ml/min/1 73sq m 119   Phosphorus Latest Ref Range: 2 7 - 4 5 mg/dL 3 8   Magnesium Latest Ref Range: 1 6 - 2 6 mg/dL 2 4   WBC Latest Ref Range: 4 31 - 10 16 Thousand/uL 6 47   Red Blood Cell Count Latest Ref Range: 3 81 - 5 12 Million/uL 3 53 (L)   Hemoglobin Latest Ref Range: 11 5 - 15 4 g/dL 10 5 (L)   HCT Latest Ref Range: 34 8 - 46 1 % 31 1 (L)   MCV Latest Ref Range: 82 - 98 fL 88   MCH Latest Ref Range: 26 8 - 34 3 pg 29 7   MCHC Latest Ref Range: 31 4 - 37 4 g/dL 33 8   RDW Latest Ref Range: 11 6 - 15 1 % 12 9   Platelet Count Latest Ref Range: 149 - 390 Thousands/uL 200   MPV Latest Ref Range: 8 9 - 12 7 fL 8 9   nRBC Latest Units: /100 WBCs 0   Neutrophils % Latest Ref Range: 43 - 75 % 38 (L)   Immat GRANS % Latest Ref Range: 0 - 2 % 0   Lymphocytes Relative Latest Ref Range: 14 - 44 % 48 (H)   Monocytes Relative Latest Ref Range: 4 - 12 % 7   Eosinophils Latest Ref Range: 0 - 6 % 7 (H)   Basophils Relative Latest Ref Range: 0 - 1 % 0   Immature Grans Absolute Latest Ref Range: 0 00 - 0 20 Thousand/uL 0 01   Absolute Neutrophils Latest Ref Range: 1 85 - 7 62 Thousands/µL 2 43   Lymphocytes Absolute Latest Ref Range: 0 60 - 4 47 Thousands/µL 3 17   Absolute Monocytes Latest Ref Range: 0 17 - 1 22 Thousand/µL 0 42   Absolute Eosinophils Latest Ref Range: 0 00 - 0 61 Thousand/µL 0 43   Basophils Absolute Latest Ref Range: 0 00 - 0 10 Thousands/µL 0 01   Procalcitonin Latest Ref Range: <=0 25 ng/ml <0 05     Imaging: attempt to get imaging from [facility], was just signed out AMA yesterday  EKG, Pathology, and Other Studies: none    Counseling / Coordination of Care  Total floor / unit time spent today 15 minutes  Greater than 50% of total time was spent with the patient and / or family counseling and / or coordination of care

## 2021-05-12 NOTE — PROGRESS NOTES
Patient's telemetry alarming at HR 30s-40s with junctional rhythm confirmed with EKG  BP soft at 96/52  Provider Reg Lopez with [facility] made aware; per provider cardiology has no new interventions at this time  Continue to monitor patient on telemetry and encouraged to make needs known

## 2021-05-12 NOTE — CONSULTS
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    Septic embolism (HCC)    Hypokalemia    Bipolar 1 disorder ([place] [address] )    Right hip pain    Intravenous drug abuse ([place] [address] )    DVT of axillary vein, acute left (HCC)    Elevated troponin    Rash    Anemia    Chronic hepatitis C virus infection ([place] [address] )    Chronic, continuous use of opioids    Hyperphosphatemia    S/P TVR (tricuspid valve repair)    Chronic anticoagulation    GERD (gastroesophageal reflux disease)    Suspected UTI (urinary tract infection)    Sacroiliitis (HCC)    Cellulitis of neck    Generalized anxiety disorder    Left against medical advice    Right forearm cellulitis    Exposure to STD    Retained foreign body    Homelessness    BV (bacterial vaginosis)    Phlegmon    Bradycardia    Acute neck pain      Rubina Mauricio is a 31 y o  female with a history of IVDA/polysubstance abuse presents with acute neck pain, phlegmon and retained foreign body in neck      Plan:   · Add Robaxin 750mg every 6 hours scheduled for pain  · Add Gabapentin 300mg BID  · Continue Tylenol as needed  · Oxycodone 5mg every 4 hours as needed for moderate pain  · Oxycodone 10mg every 4 hours as needed for severe pain  · Dilaudid 1mg IV every 4 hours as needed for breakthrough pain   · No further increases to opioid regimen recommended  · If bradycardia/hypotension is persist, would recommend to reduced opioid dosing    Bowel Management  · Colace 100mg BID  · Senna daily     Substance Use Disorder  · Injection of cocaine into her neck prior to admission  · On Sublocade injections monthly; patient reports last injection was April 2021  · Prescribed by PCP Dr Luz Maria Pelayo    Treatment recommendations discussed with primary care service  APS will continue to follow  Please call  / [phone] or [employer] Acute Pain [facility] - [facility] (/ between [phone] and on weekends) with questions or concerns    History of Present Illness    Admit Date:  5/11/2021  Hospital Day:  0 days  Primary Service:  Hospitalist  Attending Provider: Karen Elias MD  Reason for Consult / Principal Problem: acute on chronic pain  HPI: Arpit Calderon is a 31 y o  female who was recently admitted in [facility] and transferred to [facility] for management of soft tissue swelling of the neck, possibly neck phlegmon and was started on IV antibiotics  Patient left AMA and presented to [facility] for further evaluation  ID consulted, surgery consulted for retained foreign body, and cardiology consulted for bradycardia  Previous admissions for Recurrence MSSA bacteremia, TV endocarditis, requiring IV antibiotics and TV replacement  Acute Pain Service has seen patient in the past and pain has been managed with Tylenol, gabapentin, Toradol, lidocaine patches, Robaxin, ketamine infusion, oxycodone, and morphine  History of polysubstance abuse, most recent use of injection of cocaine into her neck; she states the cocaine is cut with Fentanyl  Her PCP prescribes Sublocade injections; she reports that the injections are not effective and do not control drug cravings and no longer wishes to get the monthly injections  Also does not want to restart Suboxone  Plans on going to a recovery house post discharge  Current pain location(s): neck  Pain Scale:   6-10  Quality: throbbing   Current Analgesic regimen: In the last 24 hours patient received clonidine, clonazepam, Dilaudid 2 mg IV and oxycodone 15 mg  At present time patient is resting in bed, no acute distress    Continues to report moderate to severe pain in her neck that is throbbing and mildly improves with Dilaudid and oxycodone  I have reviewed the patient's controlled substance dispensing history in the Prescription Drug Monitoring Program in compliance with the [facility] regulations before prescribing any controlled substances  Inpatient consult to Acute Pain Service  Consult performed by: BOO Schrader  Consult ordered by: BOO Canales          Review of Systems   Respiratory: Negative for shortness of breath  Cardiovascular: Negative for chest pain  Gastrointestinal: Negative for nausea and vomiting  Genitourinary: Positive for difficulty urinating, frequency and urgency  Musculoskeletal: Positive for neck pain  Skin: Positive for color change  Neurological: Negative for headaches  All other systems reviewed and are negative        Historical Information   Past Medical History:   Diagnosis Date    Abnormal Pap smear of cervix     Anxiety     Depression     Endocarditis     2018    Hepatitis C     HPV (human papilloma virus) anogenital infection      Past Surgical History:   Procedure Laterality Date    IR PICC PLACEMENT DOUBLE LUMEN  8/26/2020    KNEE SURGERY Left     MOUTH SURGERY      ME REPLACE TRICUSPID W CP BYPASS N/A 9/10/2020    Procedure: REPAIR VALVE TRICUSPID with [employer] 30mm 3D Contour  Annuloplasty Ring;  Surgeon: Araceli Cuba MD;  [facility]: [facility];  Service: Cardiac Surgery    TOOTH EXTRACTION N/A 9/7/2020    Procedure: EXTRACTION TOOTH 28;  Surgeon: Cathryn Liriano DMD;  Location: [facility];  Service: Maxillofacial     Social History   Social History     Substance and Sexual Activity   Alcohol Use Not Currently    Alcohol/week: 0 0 standard drinks    Frequency: Monthly or less    Binge frequency: Never     Social History     Substance and Sexual Activity   Drug Use Yes    Types: Heroin, Marijuana, Benzodiazepines, Cocaine, Methamphetamines Social History     Tobacco Use   Smoking Status Current Every Day Smoker    Packs/day: 0 50    Types: Cigarettes    Last attempt to quit: 2016    Years since quittin 5   Smokeless Tobacco Never Used     Family History: non-contributory    Meds/Allergies   all current active meds have been reviewed, current meds:   Current Facility-Administered Medications   Medication Dose Route Frequency    acetaminophen (TYLENOL) tablet 650 mg  650 mg Oral Q6H PRN    aluminum-magnesium hydroxide-simethicone (MYLANTA) oral suspension 30 mL  30 mL Oral Q6H PRN    apixaban (ELIQUIS) tablet 5 mg  5 mg Oral BID    ARIPiprazole (ABILIFY) tablet 10 mg  10 mg Oral Daily    aspirin (ECOTRIN LOW STRENGTH) EC tablet 81 mg  81 mg Oral Daily    clonazePAM (KlonoPIN) tablet 0 5 mg  0 5 mg Oral BID    cloNIDine (CATAPRES) tablet 0 2 mg  0 2 mg Oral Q12H [address]    docusate sodium (COLACE) capsule 100 mg  100 mg Oral BID PRN    emtricitabine-tenofovir (TRUVADA 200-300) combo dose   Oral Daily    HYDROmorphone (DILAUDID) injection 1 mg  1 mg Intravenous Q4H PRN    multi-electrolyte (PLASMALYTE-A/ISOLYTE-S PH 7 4) IV solution  125 mL/hr Intravenous Continuous    nicotine (NICODERM CQ) 21 mg/24 hr TD 24 hr patch 1 patch  1 patch Transdermal Daily    ondansetron (ZOFRAN) injection 4 mg  4 mg Intravenous Q4H PRN    oxyCODONE (ROXICODONE) IR tablet 5 mg  5 mg Oral Q4H PRN    piperacillin-tazobactam (ZOSYN) 4 5 g in sodium chloride 0 9 % 100 mL IVPB  4 5 g Intravenous Q6H    QUEtiapine (SEROquel) tablet 25 mg  25 mg Oral HS    raltegravir (ISENTRESS) tablet 400 mg  400 mg Oral BID    temazepam (RESTORIL) capsule 15 mg  15 mg Oral HS PRN    vancomycin (VANCOCIN) 1,250 mg in sodium chloride 0 9 % 250 mL IVPB  20 mg/kg Intravenous Q12H    and PTA meds:   Prior to Admission Medications   Prescriptions Last Dose Informant Patient Reported? Taking?    ARIPiprazole (ABILIFY) 10 mg tablet   Yes No   Sig: Take 10 mg by mouth daily Dosage unknown   QUEtiapine (SEROquel) 25 mg tablet   No No   Sig: Take 1 tablet (25 mg total) by mouth daily at bedtime   apixaban (ELIQUIS) 5 mg   No No   Sig: Take 2 tablets (10 mg total) by mouth 2 (two) times a day for 7 days, THEN 1 tablet (5 mg total) 2 (two) times a day for 23 days  aspirin (ECOTRIN LOW STRENGTH) 81 mg EC tablet   No No   Sig: Take 1 tablet (81 mg total) by mouth daily   cloNIDine (CATAPRES) 0 2 mg tablet   No No   Sig: Take 1 tablet (0 2 mg total) by mouth every 12 (twelve) hours   clonazePAM (KlonoPIN) 0 5 mg tablet   No No   Sig: Take 1 tablet (0 5 mg total) by mouth 2 (two) times a day for 6 doses   emtricitabine-tenofovir (TRUVADA) 200-300 mg per tablet   No No   Sig: Take 1 tablet by mouth daily   nicotine (NICODERM CQ) 14 mg/24hr TD 24 hr patch   No No   Sig: Place 1 patch on the skin daily   ondansetron (ZOFRAN-ODT) 4 mg disintegrating tablet   No No   Sig: Take 1 tablet (4 mg total) by mouth every 6 (six) hours as needed for nausea or vomiting   raltegravir (ISENTRESS) 400 mg tablet   No No   Sig: Take 1 tablet (400 mg total) by mouth 2 (two) times a day      Facility-Administered Medications: None       Allergies   Allergen Reactions    Cat Hair Extract     Dog Epithelium     Latex     Pollen Extract        Objective   Temp:  [97 3 °F (36 3 °C)-98 °F (36 7 °C)] 97 3 °F (36 3 °C)  HR:  [38-87] 40  Resp:  [16-18] 16  BP: ()/(51-87) 96/52    Intake/Output Summary (Last 24 hours) at 5/12/2021 1226  Last data filed at 5/12/2021 0515  Gross per 24 hour   Intake 286 67 ml   Output --   Net 286 67 ml       Physical Exam  Vitals signs reviewed  Constitutional:       General: She is awake  She is not in acute distress  Appearance: She is not ill-appearing, toxic-appearing or diaphoretic  HENT:      Head: Normocephalic and atraumatic  Nose: Nose normal  No rhinorrhea  Mouth/Throat:      Mouth: Mucous membranes are dry     Eyes:      Extraocular Movements: Extraocular movements intact  Neck:      Musculoskeletal: Edema present  Cardiovascular:      Rate and Rhythm: Bradycardia present  Pulmonary:      Effort: Pulmonary effort is normal  No tachypnea, bradypnea or respiratory distress  Skin:     General: Skin is warm and dry  Findings: Bruising (neck) present  Neurological:   ·    Mental Status: She is alert and oriented to person, place, and time  Mental status is at baseline  Psychiatric:         Mood and Affect: Mood is anxious  Speech: Speech normal          Behavior: Behavior normal  Behavior is cooperative  Lab Results:   I have personally reviewed pertinent labs  , CBC:   Lab Results   Component Value Date    WBC 6 47 05/12/2021    HGB 10 5 (L) 05/12/2021    HCT 31 1 (L) 05/12/2021    MCV 88 05/12/2021     05/12/2021    MCH 29 7 05/12/2021    MCHC 33 8 05/12/2021    RDW 12 9 05/12/2021    MPV 8 9 05/12/2021    NRBC 0 05/12/2021   , CMP:   Lab Results   Component Value Date    SODIUM 144 05/12/2021    K 3 5 05/12/2021     (H) 05/12/2021    CO2 26 05/12/2021    BUN 7 05/12/2021    CREATININE 0 68 05/12/2021    CALCIUM 8 0 (L) 05/12/2021    AST 26 05/12/2021    ALT 36 05/12/2021    ALKPHOS 66 05/12/2021    EGFR 119 05/12/2021   , BMP:  Lab Results   Component Value Date    SODIUM 144 05/12/2021    K 3 5 05/12/2021     (H) 05/12/2021    CO2 26 05/12/2021    BUN 7 05/12/2021    CREATININE 0 68 05/12/2021    GLUC 102 05/12/2021    CALCIUM 8 0 (L) 05/12/2021    AGAP 3 (L) 05/12/2021    EGFR 119 05/12/2021   , PT/PTT:No results found for: PT, PTT    Imaging Studies: I have personally reviewed pertinent reports  UDS on 5/9/2021 + for opiates, cocaine, and cannabinoids    Counseling / Coordination of Care  Total floor / unit time spent today Level 3 = 55 minutes  Greater than 50% of total time was spent with the patient and / or family counseling and / or coordination of care   A description of the counseling / coordination of care: Reviewed plan of care and medications with patient, RN staff and primary care service  Please note that the APS provides consultative services regarding pain management only  With the exception of ketamine and epidural infusions and except when indicated, final decisions regarding starting or changing doses of analgesic medications are at the discretion of the consulting service  Off hours consultation and/or medication management is generally not available      BOO De Santiago  Acute Pain Service

## 2021-05-12 NOTE — ASSESSMENT & PLAN NOTE
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

## 2021-05-12 NOTE — ASSESSMENT & PLAN NOTE
· Secondary to IV drug use  · Seen and evaluated initially in March 2021   Surgery evaluated and had no plan for removal at that time  · Given worsening neck pain, and what appears to be worsening inflammatory changes will re-consult surgery for possible removal

## 2021-05-12 NOTE — PROGRESS NOTES
[address] [facility]  Progress Note - Rhetta Ganser 1990, 31 y o  female MRN: [id number]  Unit/Bed#: [facility] 834-01 Encounter: [id number]  Primary Care Provider: Yaya Watters PA-C   Date and time admitted to hospital: 5/11/2021  9:59 PM    * Phlegmon  Assessment & Plan  Background: Recently admitted in [facility] and transferred to [facility] for further management  Evaluated by internal medicine service and continued with ID and ENT supervision on 72 hours of Zosyn and vancomycin for phlegmon  · Reportedly, if the patient's cultures are negative and patient continues to do well; discontinue antibiotics  · Imaging studies done in [facility] did not reveal foreign body at the neck  However there is soft tissue swelling raising the possibility of neck phlegmon; Pending cultures however cannot find access to these results   · Infectious Disease consulted; input appreciated   · Will defer need for IR consult and better source control to their team   · Continue Zosyn and vancomycin for now   · Ensure adequate pain control; See acute neck pain for further details     Acute neck pain  Assessment & Plan  · Secondary to primary problem   · APS consulted; assistance appreciated given complex IVDU history     Bradycardia  Assessment & Plan  · Noted to have bradycardia on routine nursing check    · Continue telemetry for additional 24 hours   · 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   · At times does complain of fatigue and lightheadedness  · Cardiology consulted; input as noted below   · No indication for pacemaker at this time     Intravenous drug abuse [facility])  Assessment & Plan  · With history of IVDA  · Case management consulted; assistance appreciated with dispo   · Denies any cocaine or IVDU since prior hospitalization     Chronic anticoagulation  Assessment & Plan  · Secondary to acute L arm DVT   · Continue Eliquis 5 mg twice daily     Insomnia  Assessment & Plan  · Restoril 15 mg daily at night  Retained foreign body of neck  Assessment & Plan  · Secondary to IV drug use  · Seen and evaluated initially in 2021  Surgery evaluated and had no plan for removal at that time  · Given worsening neck pain, and what appears to be worsening inflammatory changes will re-consult surgery for possible removal    Bipolar 1 disorder (HCC)  Assessment & Plan  · Continue Abilify 10 mg daily  · Klonopin 0 5 mg twice daily  · Seroquel 25 mg HS  · Supportive care       VTE Pharmacologic Prophylaxis:   Pharmacologic: Apixaban (Eliquis)  Mechanical VTE Prophylaxis in Place: Yes    Patient Centered Rounds: I have performed bedside rounds with nursing staff today  Discussions with Specialists or Other Care Team Provider: nursing staff, case management, surgery, cardiology, and case management     Education and Discussions with Family / Patient: education provided at the bedside in regards to plan of care as noted above  Patient refused contact to her family however I did offer to call and update  Time Spent for Care: 30 minutes  More than 50% of total time spent on counseling and coordination of care as described above  Current Length of Stay: 0 day(s)    Current Patient Status: Inpatient   Certification Statement: The patient will continue to require additional inpatient hospital stay due to plan as above  Discharge Plan: likely 48-72 additional hours     Code Status: Level 1 - Full Code      Subjective:   Patient still reporting significant neck pain worsened with movement  Objective:     Vitals:   Temp (24hrs), Av 7 °F (36 5 °C), Min:97 3 °F (36 3 °C), Max:98 °F (36 7 °C)    Temp:  [97 3 °F (36 3 °C)-98 °F (36 7 °C)] 97 3 °F (36 3 °C)  HR:  [38-87] 40  Resp:  [16-18] 16  BP: ()/(51-87) 96/52  SpO2:  [96 %-100 %] 96 %  Body mass index is 23 3 kg/m²  Input and Output Summary (last 24 hours):        Intake/Output Summary (Last 24 hours) at 5/12/2021 1443  Last data filed at 5/12/2021 0515  Gross per 24 hour   Intake 286 67 ml   Output --   Net 286 67 ml       Physical Exam:     Physical Exam  Vitals signs and nursing note reviewed  Constitutional:       General: She is not in acute distress  Appearance: She is well-developed  HENT:      Head: Normocephalic and atraumatic  Eyes:      Conjunctiva/sclera: Conjunctivae normal    Neck:      Musculoskeletal: Neck supple  Muscular tenderness present  Cardiovascular:      Rate and Rhythm: Regular rhythm  Bradycardia present  Pulses: Normal pulses  Heart sounds: No murmur  Pulmonary:      Effort: Pulmonary effort is normal  No respiratory distress  Breath sounds: Normal breath sounds  Abdominal:      General: Abdomen is flat  Bowel sounds are normal  There is no distension  Palpations: Abdomen is soft  Tenderness: There is no abdominal tenderness  Musculoskeletal:         General: No swelling  Skin:     General: Skin is warm and dry  Capillary Refill: Capillary refill takes less than 2 seconds  Coloration: Skin is not jaundiced  Neurological:      Mental Status: She is alert and oriented to person, place, and time  Psychiatric:         Mood and Affect: Mood normal          Behavior: Behavior normal  Behavior is cooperative           Judgment: Judgment normal          Additional Data:     Labs:    Results from last 7 days   Lab Units 05/12/21  0615   WBC Thousand/uL 6 47   HEMOGLOBIN g/dL 10 5*   HEMATOCRIT % 31 1*   PLATELETS Thousands/uL 200   NEUTROS PCT % 38*   LYMPHS PCT % 48*   MONOS PCT % 7   EOS PCT % 7*     Results from last 7 days   Lab Units 05/12/21  0615   SODIUM mmol/L 144   POTASSIUM mmol/L 3 5   CHLORIDE mmol/L 115*   CO2 mmol/L 26   BUN mg/dL 7   CREATININE mg/dL 0 68   ANION GAP mmol/L 3*   CALCIUM mg/dL 8 0*   ALBUMIN g/dL 2 9*   TOTAL BILIRUBIN mg/dL 0 30   ALK PHOS U/L 66   ALT U/L 36   AST U/L 26   GLUCOSE RANDOM mg/dL 102                 Results from last 7 days   Lab Units 05/12/21  0615   PROCALCITONIN ng/ml <0 05           * I Have Reviewed All Lab Data Listed Above  * Additional Pertinent Lab Tests Reviewed: All Labs For Current Hospital Admission Reviewed    Imaging:    Imaging Reports Reviewed Today Include: no images for review  Images from prior hospitalization reviewed     Imaging Personally Reviewed by Myself Includes:  None     Recent Cultures (last 7 days):           Last 24 Hours Medication List:   Current Facility-Administered Medications   Medication Dose Route Frequency Provider Last Rate    acetaminophen  650 mg Oral Q6H PRN Rosenda Charles MD      aluminum-magnesium hydroxide-simethicone  30 mL Oral Q6H PRN Rosenda Charles MD      apixaban  5 mg Oral BID Rosenda Charles MD      ARIPiprazole  10 mg Oral Daily Rosenda Charles MD      aspirin  81 mg Oral Daily Rosenda Charles MD      clonazePAM  0 5 mg Oral BID Rosenda Charles MD      docusate sodium  100 mg Oral BID PRN Rosenda Charles MD      emtricitabine-tenofovir (TRUVADA 200-300) combo dose   Oral Daily Rosenda Charles MD      HYDROmorphone  1 mg Intravenous Q4H PRN Rosenda Charles MD      multi-electrolyte  125 mL/hr Intravenous Continuous Rosenda Charles  mL/hr (05/12/21 1252)    nicotine  1 patch Transdermal Daily Rosenda Charles MD      ondansetron  4 mg Intravenous Q4H PRN Rosenda Charles MD      oxyCODONE  5 mg Oral Q4H PRN Rosenda Charles MD      piperacillin-tazobactam  4 5 g Intravenous Q6H Roesnda Charles MD Stopped (05/12/21 1127)    QUEtiapine  25 mg Oral HS Rosenda Charles MD      raltegravir  400 mg Oral BID Rosenda Charles MD      temazepam  15 mg Oral HS PRN Rosenda Charles MD      vancomycin  20 mg/kg Intravenous Q12H Rosenda Charles MD 1,250 mg (05/12/21 1251)        Today, Patient Was Seen By: BOO Nguyen    ** Please Note: Dictation voice to text software may have been used in the creation of this document   **

## 2021-05-12 NOTE — PROGRESS NOTES
Attempting to place IV site due to infiltrated IV and patient requesting site change  Multiple nurses and attempts made  ICU nurses attempting at bedside with ultrasound with no success  Patient making statements saying, " I am so frustrated I am just going to leave " Provider Les Hollis with [facility] made aware  Patient agreeable to stay now stating, "I won't leave but figure out my IV" Will continue to attempt placement for IV site with change of shift  IV fluids are unable to be administered at this time

## 2021-05-12 NOTE — H&P
Alem 43 1990, 31 y o  female MRN: [id number]  Unit/Bed#: [facility] Encounter: [id number]  Primary Care Provider: Mushtaq Ruiz PA-C   Date and time admitted to hospital: 5/11/2021  9:59 PM    * Phlegmon  Assessment & Plan  Recently admitted in [facility] and transferred to [facility] for further management  Started by internal medicine service and continued with ID and ENT supervision on 72 hours of Zosyn and vancomycin  Pending cultures  Reportedly, if the patient's cultures are negative and patient continues to do well; discontinue antibiotics  Imaging studies done in [facility] did not reveal foreign body at the neck  However there is soft tissue swelling raising the possibility of neck phlegmon  Will place Infectious Disease consult see patient  Continue Zosyn and vancomycin  Patient with experienced pain:  Currently on oxycodone 5 mg every 6 hours as needed and Dilaudid 1 mg every 4 hours as needed for severe pain  Chronic anticoagulation  Assessment & Plan  Continue Eliquis 5 mg twice daily  Bipolar 1 disorder (HCC)  Assessment & Plan  Continue Abilify 10 mg daily  Klonopin 0 5 mg twice daily  Seroquel 25 mg HS  Intravenous drug abuse [facility])  Assessment & Plan  With history of IVDA:  Case management consult  Insomnia  Assessment & Plan  Restoril 15 mg daily at night  VTE Prophylaxis: Apixaban (Eliquis)  / sequential compression device   Code Status: Prior full Code  POLST: There is no POLST form on file for this patient (pre-hospital)    Anticipated Length of Stay:  Patient will be admitted on an Observation basis with an anticipated length of stay of  less than 2 midnights  Justification for Hospital Stay: Please see detailed plans noted above      Chief Complaint:     Continuation of hospitalization from [facility] where she left AMA; right neck cellulitis and phlegmon formation  History of Present Illness:  dEmar Rosales is a 31 y o  female who has past medical history significant for intravenous drug abuse and noncompliance with indiscrete sexual relations for the procurement of drugs for her drug habit  She also also on HAART for HIV (?), with bipolar disorder  This patient was recently seen in [address] [place] [address] and transferred to [facility] due to neck cellulitis with phlegmon formation due to IV drug abuse  The patient was then started on Zosyn with vancomycin with consult to ENT and Infectious Disease  It is the recommendation to continue antibiotics for 72 hours pending blood cultures  According to the final note from [facility], the blood cultures have been negative so far  Essentially, the patient needed 24 more hours of antibiotics to monitor for progression of possible drainable abscess  So far the CT studies of the neck did not show any fluid collection  CT of the neck results are quoted as follows:  "CT neck:  IMPRESSION:   1  Soft tissue swelling and hemorrhage identified within the soft tissues of   the neck anteriorly  Multiple foci of gas are identified within these soft   tissues, consistent with the clinical history of penetrating trauma  Swelling   is identified adjacent to the bilateral sternocleidomastoid muscles, with foci   of gas within and/or adjacent to these muscles  2  There is an increase in density involving the anterior mediastinum, which   can be contributed by thymic tissue although complex or hemorrhagic fluid is   also considered  There is a small complex/hemorrhagic pericardial effusion   partially visualized  This can be further evaluated with a chest CT with   contrast    3  Artifact limits evaluation of the proximal left common carotid artery  No   stenosis, occlusion, or visualized dissection otherwise visualized of the   extracranial carotid arteries bilaterally     4  Mild dominance of the left vertebral artery  5  Additional findings described below  Likewise, the patient had a DVT study of the upper extremities which did not show any thromboembolism  However noted is that the patient supposed to be on chronic anticoagulation with Eliquis  In [facility], she was placed on heparin drip  Currently, patient is looking comfortable however complaining of neck pain  The patient does not demonstrate any distress      Review of Systems:    Constitutional:  Denies fever or chills   Eyes:  Denies change in visual acuity   HENT:  Denies nasal congestion or sore throat   Respiratory:  Denies cough or shortness of breath   Cardiovascular:  Denies chest pain or edema   GI:  Denies abdominal pain, nausea, vomiting, bloody stools or diarrhea   :  Denies dysuria   Musculoskeletal:  Denies back pain or joint pain with note of neck pain  Integument:  Denies rash   Neurologic:  Denies headache, focal weakness or sensory changes   Endocrine:  Denies polyuria or polydipsia   Lymphatic:  Denies swollen glands   Psychiatric:  Denies depression or anxiety     Past Medical and Surgical History:   Past Medical History:   Diagnosis Date    Abnormal Pap smear of cervix     Anxiety     Depression     Endocarditis     2018    Hepatitis C     HPV (human papilloma virus) anogenital infection      Past Surgical History:   Procedure Laterality Date    IR PICC PLACEMENT DOUBLE LUMEN  8/26/2020    KNEE SURGERY Left     MOUTH SURGERY      MA REPLACE TRICUSPID W CP BYPASS N/A 9/10/2020    Procedure: REPAIR VALVE TRICUSPID with Medtronic 30mm 3D Contour  Annuloplasty Ring;  Surgeon: Kaylee Diehl MD;  [facility];  Service: Cardiac Surgery    TOOTH EXTRACTION N/A 9/7/2020    Procedure: EXTRACTION TOOTH 32;  Surgeon: Alana Swift DMD;  [facility];  Service: Maxillofacial       Meds/Allergies:  (Not in a hospital admission)  Inpatient Medicationsacetaminophen, 650 mg, oral, Q6H  ARIPiprazole, 10 mg, oral, Daily  clonazePAM, 0 5 mg, oral, BID  cloNIDine, 0 2 mg, oral, Q12H JEFF  docusate, 100 mg, oral, BID  morphine, 2 mg, intravenous, Once  piperacillin-tazobactam, 3 375 g, intravenous, Q8H  QUEtiapine, 100 mg, oral, Nightly  vancomycin, 15 mg/kg/dose, intravenous, Q12H    heparin, 0-26 Units/kg/hr, Last Rate: 1,000 Units/hr (21 1400)    dextrose, 15 g, oral, PRN  dextrose 50 %, 12 5 g, intravenous, PRN  glucagon (human recombinant), 1 mg, subcutaneous, PRN  heparin, 0-80 Units/kg/dose, intravenous, PRN  ketorolac, 30 mg, intravenous, Q6H PRN  melatonin, 3 mg, oral, Nightly PRN  morphine, 2 mg, intravenous, Q3H PRN  pneumococcal polysaccharide, 0 5 mL, intramuscular, During hospitalization      Allergies: Allergies   Allergen Reactions    Cat Hair Extract     Dog Epithelium     Latex     Pollen Extract      History:  Marital Status: Single   Occupation: ---  Patient Pre-hospital Living Situation:  Lives at home  Patient Pre-hospital Level of Mobility:  Ambulatory  Patient Pre-hospital Diet Restrictions:  Regular  Substance Use History:   Social History     Substance and Sexual Activity   Alcohol Use Not Currently    Alcohol/week: 0 0 standard drinks    Frequency: Monthly or less    Binge frequency: Never     Social History     Tobacco Use   Smoking Status Current Every Day Smoker    Packs/day: 0 50    Types: Cigarettes    Last attempt to quit: 2016    Years since quittin 5   Smokeless Tobacco Never Used     Social History     Substance and Sexual Activity   Drug Use Yes    Types: Heroin, Marijuana, Benzodiazepines, Cocaine, Methamphetamines       Family History:  History reviewed  No pertinent family history      Physical Exam:     Vitals:   Blood Pressure: 148/87 (21)  Pulse: (!) 44 (21)  Temperature: 98 °F (36 7 °C) (21)  Temp Source: Oral (21 5014)  Respirations: 18 (21 1932)  Weight - Scale: 64 9 kg (143 lb) (05/11/21 9070)  SpO2: 100 % (05/11/21 2058)    Constitutional:  Well developed, well nourished, no acute distress, non-toxic appearance   Eyes:  PERRL, conjunctiva normal   HENT:  Atraumatic, external ears normal, nose normal, oropharynx moist, no pharyngeal exudates  Neck- normal range of motion, no tenderness, supple   Respiratory:  No respiratory distress, normal breath sounds, no rales, no wheezing   Cardiovascular:  Normal rate, normal rhythm, no murmurs, no gallops, no rubs   GI:  Soft, nondistended, normal bowel sounds, nontender, no organomegaly, no mass, no rebound, no guarding   :  No costovertebral angle tenderness   Musculoskeletal:  No edema, claims pain and tenderness of the neck, neck area is notable for presence of swelling with note of irregular areas of violaceous rash Back- no tenderness  Integument:  Well hydrated, no rash   Lymphatic:  No lymphadenopathy noted   Neurologic:  Alert &awake, communicative, CN 2-12 normal, normal motor function, normal sensory function, no focal deficits noted   Psychiatric:  Speech and behavior appropriate       Lab Results: I have personally reviewed pertinent reports  Pending lab results            Invalid input(s): LABALBU            Imaging: I have personally reviewed pertinent reports  from [facility]  No results found  ** Please Note: Dragon 360 Dictation voice to text software was used in the creation of this document   **

## 2021-05-12 NOTE — PROGRESS NOTES
ICU nurse called me from patient's room while she was attempting to insert an IV site via ultrasound stating that patient told her she was going down to the ED to get an IV  Charge nurse then called me informing me that patient walked off the unit  Charge nurse followed patient to the ED to look for her; hospital supervisor made aware and brought patient back to the room and is at bedside making patient aware of necessary protocols and not allowed wander or leave her room  Patient agreeable and following commands  Call lovelace is within reach and room is near nurse's station

## 2021-05-12 NOTE — ASSESSMENT & PLAN NOTE
Background: Recently admitted in [facility] and transferred to [facility] for further management  Evaluated by internal medicine service and continued with ID and ENT supervision on 72 hours of Zosyn and vancomycin for phlegmon  · Reportedly, if the patient's cultures are negative and patient continues to do well; discontinue antibiotics  · Imaging studies done in [facility] did not reveal foreign body at the neck    However there is soft tissue swelling raising the possibility of neck phlegmon; Pending cultures however cannot find access to these results   · Infectious Disease consulted; input appreciated   · Will defer need for IR consult and better source control to their team   · Continue Zosyn and vancomycin for now   · Ensure adequate pain control; See acute neck pain for further details

## 2021-05-12 NOTE — PLAN OF CARE
Problem: Potential for Falls  Goal: Patient will remain free of falls  Description: INTERVENTIONS:  - Assess patient frequently for physical needs  -  Identify cognitive and physical deficits and behaviors that affect risk of falls    -  [place] fall precautions as indicated by assessment   - Educate patient/family on patient safety including physical limitations  - Instruct patient to call for assistance with activity based on assessment  - Modify environment to reduce risk of injury  - Consider OT/PT consult to assist with strengthening/mobility  Outcome: Progressing     Problem: PAIN - ADULT  Goal: Verbalizes/displays adequate comfort level or baseline comfort level  Description: Interventions:  - Encourage patient to monitor pain and request assistance  - Assess pain using appropriate pain scale  - Administer analgesics based on type and severity of pain and evaluate response  - Implement non-pharmacological measures as appropriate and evaluate response  - Consider cultural and social influences on pain and pain management  - Notify physician/advanced practitioner if interventions unsuccessful or patient reports new pain  Outcome: Progressing     Problem: INFECTION - ADULT  Goal: Absence or prevention of progression during hospitalization  Description: INTERVENTIONS:  - Assess and monitor for signs and symptoms of infection  - Monitor lab/diagnostic results  - Monitor all insertion sites, i e  indwelling lines, tubes, and drains  - Monitor endotracheal if appropriate and nasal secretions for changes in amount and color  - [place] appropriate cooling/warming therapies per order  - Administer medications as ordered  - Instruct and encourage patient and family to use good hand hygiene technique  - Identify and instruct in appropriate isolation precautions for identified infection/condition  Outcome: Progressing     Problem: DISCHARGE PLANNING  Goal: Discharge to home or other facility with appropriate resources  Description: INTERVENTIONS:  - Identify barriers to discharge w/patient and caregiver  - Arrange for needed discharge resources and transportation as appropriate  - Identify discharge learning needs (meds, wound care, etc )  - Arrange for interpretive services to assist at discharge as needed  - Refer to Case Management Department for coordinating discharge planning if the patient needs post-hospital services based on physician/advanced practitioner order or complex needs related to functional status, cognitive ability, or social support system  Outcome: Progressing

## 2021-05-12 NOTE — ED NOTES
Admitting md notified of low HR, ekg obtained     Marshall [facility]  05/12/21 9951

## 2021-05-12 NOTE — UTILIZATION REVIEW
Initial Clinical Review    OBS 05-11-21 CONVERTED TO INPATIENT 05-12-21 FOR CONTINUATION OF CARE FOR PHLEGMON AND THE NEED FOR CONTINUING IV ANTIBIOTICS THERAPY AND EKG SHOWING [facility] CONSULTED CARDIOLOGY    Inpatient Admission Once     Question Answer Comment   Level of Care Med Surg    Estimated length of stay More than 2 Midnights    Certification I certify that inpatient services are medically necessary for this patient for a duration of greater than two midnights  See H&P and MD Progress Notes for additional information about the patient's course of treatment  05/12/21 1136             Admission: Date/Time/Statement:   Admission Orders (From admission, onward)     Ordered        05/12/21 1136  Inpatient Admission  Once         05/11/21 2237  Place in Observation  Once                   Orders Placed This Encounter   Procedures    Place in Observation     Standing Status:   Standing     Number of Occurrences:   1     Order Specific Question:   Level of Care     Answer:   Med Surg [16]    Inpatient Admission     Standing Status:   Standing     Number of Occurrences:   1     Order Specific Question:   Level of Care     Answer:   Med Surg [16]     Order Specific Question:   Estimated length of stay     Answer:   More than 2 Midnights     Order Specific Question:   Certification     Answer:   I certify that inpatient services are medically necessary for this patient for a duration of greater than two midnights  See H&P and MD Progress Notes for additional information about the patient's course of treatment  ED Arrival Information     Expected Arrival Acuity Means of Arrival Escorted By Service Admission Type    - 5/11/2021 20:04 Emergent Walk-In Self General Medicine Emergency    Arrival Complaint    neck pain tenderness        Chief Complaint   Patient presents with    Blood Infection     Pt w/ PMHx of IVDA presents c/o pain in L arm and neck pain   Pt recently seen at [facility] and told she has "multiple fatal clots" in neck and arm  Pt signed out AMA and states, "I never should have left " Pt c/o blurry vision at this time  A&Ox4  PERRLA 3mm in triage  Initial Presentation:  32 yo female presented to ED from home as observation for phlegmon  Past medical history significant for intravenous drug abuse and noncompliance with indiscrete sexual relations for the procurement of drugs for her drug habit, hepatitis-C, IVDA, history of MRSA bacteremia, history of bacterial endocarditis tricuspid status post annuloplasty, history of DVT of the axillary vein on Eliquis, She also also on HAART for HIV (?), with bipolar disorder  This patient was recently seen in [address] [place] [address] and transferred to [facility] due to neck cellulitis with phlegmon formation due to IV drug abuse  The patient was then started on Zosyn with vancomycin with consult to ENT and Infectious Disease  It is the recommendation to continue antibiotics for 72 hours pending blood cultures  According to the final note from [facility], the blood cultures have been negative so far  Essentially, the patient needed 24 more hours of antibiotics to monitor for progression of possible drainable abscess  So far the CT studies of the neck did not show any fluid collection  05-12-21 Continue IV antibiotics, ECHO ,? Wenckebach phenomenon   Placed on telemetry Consult Cardiology     ED Triage Vitals [05/11/21 2058]   Temperature Pulse Respirations Blood Pressure SpO2   98 °F (36 7 °C) (!) 44 18 148/87 100 %      Temp Source Heart Rate Source Patient Position - Orthostatic VS BP Location FiO2 (%)   Oral Monitor Sitting Left arm --      Pain Score       Worst Possible Pain          Wt Readings from Last 1 Encounters:   05/12/21 63 5 kg (140 lb)     Additional Vital Signs:   Date/Time  Temp  Pulse  Resp  BP  MAP (mmHg)  SpO2  O2 Device  Patient Position - Orthostatic VS   05/12/21 0515  --  38Abnormal   18  94/52  68  98 %  None (Room air)  Lying 05/12/21 0045  --  38Abnormal   16  100/57  72  99 %  None (Room air)  Lying   05/12/21 0016  --  87  --  134/60  --  99 %  None (Room air)         Pertinent Labs/Diagnostic Test Results:       Results from last 7 days   Lab Units 05/12/21  0615 05/11/21  2247   WBC Thousand/uL 6 47 4 99   HEMOGLOBIN g/dL 10 5* 11 3*   HEMATOCRIT % 31 1* 34 1*   PLATELETS Thousands/uL 200 212   NEUTROS ABS Thousands/µL 2 43 1 86         Results from last 7 days   Lab Units 05/12/21  0615 05/11/21  2247   SODIUM mmol/L 144 142   POTASSIUM mmol/L 3 5 3 4*   CHLORIDE mmol/L 115* 110*   CO2 mmol/L 26 30   ANION GAP mmol/L 3* 2*   BUN mg/dL 7 8   CREATININE mg/dL 0 68 0 64   EGFR ml/min/1 73sq m 119 122   CALCIUM mg/dL 8 0* 8 6   MAGNESIUM mg/dL 2 4  --    PHOSPHORUS mg/dL 3 8  --      Results from last 7 days   Lab Units 05/12/21  0615   AST U/L 26   ALT U/L 36   ALK PHOS U/L 66   TOTAL PROTEIN g/dL 6 0*   ALBUMIN g/dL 2 9*   TOTAL BILIRUBIN mg/dL 0 30         Results from last 7 days   Lab Units 05/12/21  0615 05/11/21  2247   GLUCOSE RANDOM mg/dL 102 91       EKG 05-11-21  Isorhythmic AV dissociation   Abnormal ECG   Ref Range & Units 5/12/21 0056   Ventricular Rate BPM 37 VC    Atrial Rate  VC    AK Interval ms VC    QRSD Interval ms 70 VC    QT Interval ms 566 VC    QTC Interval ms 444 VC    P Axis degrees VC    QRS Axis degrees 83 VC    T Wave Axis degrees 81 VC    VC=Value has a corrected status         ED Treatment:   Medication Administration from 05/11/2021 2004 to 05/12/2021 0738       Date/Time Order Dose Route Action     05/11/2021 2331 vancomycin (VANCOCIN) 1,250 mg in sodium chloride 0 9 % 250 mL IVPB 1,250 mg Intravenous New Bag     05/11/2021 2255 piperacillin-tazobactam (ZOSYN) 4 5 g in sodium chloride 0 9 % 100 mL IVPB 4 5 g Intravenous New Bag     05/11/2021 2342 cloNIDine (CATAPRES) tablet 0 2 mg 0 2 mg Oral Given     05/11/2021 2342 QUEtiapine (SEROquel) tablet 25 mg 25 mg Oral Given     05/11/2021 2342 raltegravir (ISENTRESS) tablet 400 mg 400 mg Oral Given     05/11/2021 2356 multi-electrolyte (PLASMALYTE-A/ISOLYTE-S PH 7 4) IV solution 125 mL/hr Intravenous New Bag     05/12/2021 0605 oxyCODONE (ROXICODONE) IR tablet 5 mg 5 mg Oral Given     05/11/2021 2338 oxyCODONE (ROXICODONE) IR tablet 5 mg 5 mg Oral Given     05/12/2021 0043 HYDROmorphone (DILAUDID) injection 1 mg 1 mg Intravenous Given        Past Medical History:   Diagnosis Date    Abnormal Pap smear of cervix     Anxiety     Depression     Endocarditis     2018    Hepatitis C     HPV (human papilloma virus) anogenital infection      Present on Admission:   Bipolar 1 disorder (HCC)   Intravenous drug abuse ([facility] )   Insomnia      Admitting Diagnosis: Phlegmon [L02 91]  Phlegmonous cellulitis [L02 91]  Bradycardia [R00 1]  History of drug abuse ([place] [address] ) [F19 11]  Blood infection ([place] [address] ) [A41 9]  History of DVT (deep vein thrombosis) [Z86 718]  At risk for elopement from healthcare setting [Z91 89]  Age/Sex: 31 y o  female  Admission Orders:  Scheduled Medications:  apixaban, 5 mg, Oral, BID  ARIPiprazole, 10 mg, Oral, Daily  aspirin, 81 mg, Oral, Daily  clonazePAM, 0 5 mg, Oral, BID  cloNIDine, 0 2 mg, Oral, Q12H [facility]  emtricitabine-tenofovir (TRUVADA 200-300) combo dose, , Oral, Daily  nicotine, 1 patch, Transdermal, Daily  piperacillin-tazobactam, 4 5 g, Intravenous, Q6H  QUEtiapine, 25 mg, Oral, HS  raltegravir, 400 mg, Oral, BID  vancomycin, 20 mg/kg, Intravenous, Q12H      Continuous IV Infusions:  multi-electrolyte, 125 mL/hr, Intravenous, Continuous      PRN Meds:  acetaminophen, 650 mg, Oral, Q6H PRN  aluminum-magnesium hydroxide-simethicone, 30 mL, Oral, Q6H PRN  docusate sodium, 100 mg, Oral, BID PRN  HYDROmorphone, 1 mg, Intravenous, Q4H PRN   x1  ondansetron, 4 mg, Intravenous, Q4H PRN  oxyCODONE, 5 mg, Oral, Q4H PRN    x2  temazepam, 15 mg, Oral, HS PRN        IP CONSULT TO INFECTIOUS DISEASES  IP CONSULT TO CASE MANAGEMENT  IP CONSULT TO CARDIOLOGY  IP CONSULT TO PHARMACY  IP CONSULT TO ACUTE PAIN SERVICE   ECHO  SCD  Telemetry      Network Utilization Review Department  ATTENTION: Please call with any questions or concerns to [phone] and carefully listen to the prompts so that you are directed to the right person  All voicemails are confidential   [place] all requests for admission clinical reviews, approved or denied determinations and any other requests to dedicated fax number below belonging to the campus where the patient is receiving treatment   List of dedicated fax numbers for the Facilities:  [address] DENIALS (Administrative/Medical Necessity) [phone]   [address] (Maternity/NICU/Pediatrics) [phone]   [address] [place] [address] [address] [address] Zenaida Bustamante [address] [facility] [facility] [phone]

## 2021-05-12 NOTE — ED NOTES
Pt had been complaining about IV for past four hours  Iv has been dressed and attempted to make comfortable  Pt found trying to remove IV dressing on several occasions  Pt educated on proper protocol on leaving IV in place        Portillo Horton RN  05/12/21 1746

## 2021-05-12 NOTE — UTILIZATION REVIEW
Inpatient Admission Authorization Request   NOTIFICATION OF INPATIENT ADMISSION/INPATIENT AUTHORIZATION REQUEST   SERVICING FACILITY:   [facility]  Address: [address] [facility], [address] [place] [address]  Tax ID: [id number]  NPI: [id number]  Place of Service: Inpatient [address] [facility] Code: 24     ATTENDING PROVIDER:  Attending Name and NPI#: Angelica Rodriguez [[id number]]  Address: [address] [place] [address], [address] [place] [address]  Phone: [phone]     UTILIZATION REVIEW CONTACT:  [facility] Utilization   Network Utilization Review Department  Phone: [phone]  Fax: [phone]  Email: [email or web address]     PHYSICIAN ADVISORY SERVICES:  FOR MPYU-CA-GEJS REVIEW - MEDICAL NECESSITY DENIAL  Phone: [phone]  Fax: [phone]  Email: [email or web address]     TYPE OF REQUEST:  Inpatient Status     ADMISSION INFORMATION:  ADMISSION DATE/TIME: 5/12/21 1136  PATIENT DIAGNOSIS CODE/DESCRIPTION:  Phlegmon [L02 91]  Phlegmonous cellulitis [L02 91]  Bradycardia [R00 1]  History of drug abuse ([facility] [address] ) [F19 11]  Blood infection (HCC) [A41 9]  History of DVT (deep vein thrombosis) [Z86 718]  At risk for elopement from healthcare setting [Z91 89]  DISCHARGE DATE/TIME: No discharge date for patient encounter  DISCHARGE DISPOSITION (IF DISCHARGED): Home/Self Care     IMPORTANT INFORMATION:  Please contact the [facility] directly with any questions or concerns regarding this request  Department voicemails are confidential     Send requests for admission clinical reviews, concurrent reviews, approvals, and administrative denials due to lack of clinical to fax [phone]

## 2021-05-12 NOTE — PROGRESS NOTES
Vancomycin Assessment    Darryle Bond is a 31 y o  female who is currently receiving vancomycin 1250mg every 12 hours for other cellulitis(mild infection)   Relevant clinical data and objective history reviewed:  Creatinine   Date Value Ref Range Status   05/11/2021 0 64 0 60 - 1 30 mg/dL Final     Comment:     Standardized to IDMS reference method   03/21/2021 1 06 0 60 - 1 30 mg/dL Final     Comment:     Standardized to IDMS reference method   03/19/2021 1 28 0 60 - 1 30 mg/dL Final     Comment:     Standardized to IDMS reference method     /57 (BP Location: Right arm)   Pulse (!) 38   Temp 98 °F (36 7 °C) (Oral)   Resp 16   Wt 64 9 kg (143 lb)   SpO2 99%   BMI 23 80 kg/m²   No intake/output data recorded  Lab Results   Component Value Date/Time    BUN 8 05/11/2021 10:47 PM    WBC 4 99 05/11/2021 10:47 PM    WBC 8 4 07/26/2016 12:00 AM    HGB 11 3 (L) 05/11/2021 10:47 PM    HGB 13 3 03/17/2017 10:19 AM    HCT 34 1 (L) 05/11/2021 10:47 PM    HCT 36 1 10/27/2016 11:35 AM    MCV 87 05/11/2021 10:47 PM    MCV 90 07/26/2016 12:00 AM     05/11/2021 10:47 PM     07/26/2016 12:00 AM     Temp Readings from Last 3 Encounters:   05/11/21 98 °F (36 7 °C) (Oral)   03/20/21 98 4 °F (36 9 °C)   03/16/21 98 °F (36 7 °C)     Vancomycin Days of Therapy: 1    Assessment/Plan  The patient is currently on vancomycin utilizing scheduled dosing based on actual body weight  Baseline risks associated with therapy include: pre-existing renal impairment  The patient is currently receiving 1250mg every 12 hours and is clinically appropriate and dose will be continued  Pharmacy will also follow closely for s/sx of nephrotoxicity, infusion reactions, and appropriateness of therapy  BMP and CBC will be ordered per protocol    Plan for trough as patient approaches steady state, prior to the 4th  dose at approximately 1100 on 05/13/21  Due to infection severity, will target a trough of 10-15 (mild infection/cellulitis)   Pharmacy will continue to follow the patients culture results and clinical progress daily      Rissa Mishra, Pharmacist

## 2021-05-12 NOTE — CONSULTS
Consultation - Infectious Disease   Jania Jeronimo 31 y o  female MRN: [id number]  Unit/Bed#: [facility] 834-01 Encounter: [id number]      IMPRESSION & RECOMMENDATIONS:   Impression/Recommendations:  1  Bilateral neck ecchymosis  Due to IVDU, with reported miss in right neck in past week  No evidence for acute infection on exam   Swelling and hemorrhage seen on CTA at [facility]  Suspect foci of air due to injection as opposed to abscess given bland exam   Blood cultures from [facility] reportedly negative  Clinically stable without sepsis  Rec:  · Discontinue antibiotics and follow closely  · Follow temperatures closely  · Recheck CBC in a m  · Obtain final blood culture results from [facility]  · Supportive care as per the primary service    2  IVDU  Active  Risk factor for above  Rec:  · Needs cessation  · Case management consult pending  · Acute pain service follow-up ongoing  · Monitor closely for withdrawal    3  High risk sexual exposure  Previously reported unprotected vaginal intercourse in exchange for drugs  Previously placed on PEP but reported noncompliance in the chart  HIV screen 05/10/2021 negative  Rec:  · Discontinue antiretrovirals given noncompliance    The above impression and plan was discussed in detail with Angelica Correa with [facility]  Antibiotics:  Vancomycin/Zosyn # 1    Thank you for this consultation  We will follow along with you  HISTORY OF PRESENT ILLNESS:  Reason for Consult:  Phlegmon    HPI: Jania Jeronimo is a 31 y o  female with active IVDU  She uses IV cocaine and shoots into her right IJ  Her tox screen is also noted to be positive for opiates and THC  She states several days ago she missed and developed pain and swelling in the right neck  She was in [place] and presented to [facility]    She is a poor historian and offers limited answers to questions but review of the medical record reveals she had a CT which showed soft tissue swelling and hemorrhage in the anterior neck with foci of gas and possible hemorrhage also seen in the anterior mediastinum and possible pericardial effusion  A CT chest was recommended  Doppler showed no DVT  She was treated with antibiotics and then left the hospital AMA  Blood cultures reportedly negative at the time that she left the hospital   She came to our ED 5/11 for continued care  Upon presentation she was noted to be afebrile with a normal WBC  She was started on vancomycin and Zosyn  We are asked to comment on further evaluation and management  Of note review of her record reveals that she was last hospitalized at [facility] in March for right arm and left supraclavicular cellulitis  She was discharged to complete course of oral antibiotics with unclear compliance  It is also noted that she has high risk intercourse in exchange for drugs and was placed on post exposure prophylaxis at that time  She was noncompliant with outpatient ID follow-up  In performing this consult, I have reviewed prior admission and outpatient visit records in detail  REVIEW OF SYSTEMS:  AS per HPI  Denies fevers, chills, sweats, nausea, vomiting, or diarrhea  Tired and wants to sleep  A complete system-based review of systems is otherwise negative      PAST MEDICAL HISTORY:  Past Medical History:   Diagnosis Date    Abnormal Pap smear of cervix     Anxiety     Depression     Endocarditis     2018    Hepatitis C     HPV (human papilloma virus) anogenital infection      Past Surgical History:   Procedure Laterality Date    IR PICC PLACEMENT DOUBLE LUMEN  8/26/2020    KNEE SURGERY Left     MOUTH SURGERY      OK REPLACE TRICUSPID W CP BYPASS N/A 9/10/2020    Procedure: REPAIR VALVE TRICUSPID with Medtronic 30mm 3D Contour  Annuloplasty Ring;  Surgeon: Trever Patiño MD;  Location: [facility];  Service: Cardiac Surgery    TOOTH EXTRACTION N/A 9/7/2020    Procedure: EXTRACTION TOOTH 32;  Surgeon: Sabi Lee Smooth, DMD;  Location: [facility];  Service: Maxillofacial       FAMILY HISTORY:  Non-contributory    SOCIAL HISTORY:  Social History     Substance and Sexual Activity   Alcohol Use Not Currently    Alcohol/week: 0 0 standard drinks    Frequency: Monthly or less    Binge frequency: Never     Social History     Substance and Sexual Activity   Drug Use Yes    Types: Heroin, Marijuana, Benzodiazepines, Cocaine, Methamphetamines     Social History     Tobacco Use   Smoking Status Current Every Day Smoker    Packs/day: 0 50    Types: Cigarettes    Last attempt to quit: 2016    Years since quittin 5   Smokeless Tobacco Never Used       ALLERGIES:  Allergies   Allergen Reactions    Cat Hair Extract     Dog Epithelium     Latex     Pollen Extract        MEDICATIONS:  All current active medications have been reviewed  PHYSICAL EXAM:  Vitals:  Temp:  [97 3 °F (36 3 °C)-98 °F (36 7 °C)] 97 3 °F (36 3 °C)  HR:  [38-87] 40  Resp:  [16-18] 16  BP: ()/(51-87) 96/52  SpO2:  [96 %-100 %] 96 %  Temp (24hrs), Av 7 °F (36 5 °C), Min:97 3 °F (36 3 °C), Max:98 °F (36 7 °C)  Current: Temperature: (!) 97 3 °F (36 3 °C)     Physical Exam:  General:  Disheveled, irritable female, in no acute distress  Eyes:  Conjunctive clear with no hemorrhages or effusions  Oropharynx:  No ulcers, no lesions  Neck:  Supple, ecchymoses bilateral supraclavicular areas with needle marks  Tenderness over right sternocleidomastoid without cellulitis, fluctuance, or purulence  Lungs:  Clear to auscultation bilaterally anteriorly, no accessory muscle use  Cardiac:  Bradycardia, no murmurs  Abdomen:  Soft, non-tender, non-distended  Extremities:  No peripheral cyanosis, clubbing, or edema  Skin:  No rashes, no ulcers  Neurological:  Moves all four extremities spontaneously, sensation grossly intact    LABS, IMAGING, & OTHER STUDIES:  Lab Results:  I have personally reviewed pertinent labs    Results from last 7 days   Lab Units 05/12/21  0615 05/11/21  2247   POTASSIUM mmol/L 3 5 3 4*   CHLORIDE mmol/L 115* 110*   CO2 mmol/L 26 30   BUN mg/dL 7 8   CREATININE mg/dL 0 68 0 64   EGFR ml/min/1 73sq m 119 122   CALCIUM mg/dL 8 0* 8 6   AST U/L 26  --    ALT U/L 36  --    ALK PHOS U/L 66  --      Results from last 7 days   Lab Units 05/12/21  0615 05/11/21  2247   WBC Thousand/uL 6 47 4 99   HEMOGLOBIN g/dL 10 5* 11 3*   PLATELETS Thousands/uL 200 212           Imaging Studies:   I have personally reviewed pertinent imaging study reports and images in PACS  EKG, Pathology, and Other Studies:   I have personally reviewed pertinent reports

## 2021-05-12 NOTE — ASSESSMENT & PLAN NOTE
Recently admitted in [facility] and transferred to [facility] for further management  Started by internal medicine service and continued with ID and ENT supervision on 72 hours of Zosyn and vancomycin  Pending cultures  Reportedly, if the patient's cultures are negative and patient continues to do well; discontinue antibiotics  Imaging studies done in [facility] did not reveal foreign body at the neck  However there is soft tissue swelling raising the possibility of neck phlegmon  Will place Infectious Disease consult see patient  Continue Zosyn and vancomycin  Patient with experienced pain:  Currently on oxycodone 5 mg every 6 hours as needed and Dilaudid 1 mg every 4 hours as needed for severe pain

## 2021-05-13 ENCOUNTER — APPOINTMENT (INPATIENT)
Dept: RADIOLOGY | Facility: HOSPITAL | Age: 31
DRG: 383 | End: 2021-05-13
Payer: COMMERCIAL

## 2021-05-13 PROBLEM — R58 ECCHYMOSIS OF NECK: Status: ACTIVE | Noted: 2021-05-11

## 2021-05-13 LAB
ALBUMIN SERPL BCP-MCNC: 3.1 G/DL (ref 3.5–5)
ALP SERPL-CCNC: 79 U/L (ref 46–116)
ALT SERPL W P-5'-P-CCNC: 34 U/L (ref 12–78)
ANION GAP SERPL CALCULATED.3IONS-SCNC: 5 MMOL/L (ref 4–13)
AST SERPL W P-5'-P-CCNC: 18 U/L (ref 5–45)
BASOPHILS # BLD AUTO: 0.02 THOUSANDS/ΜL (ref 0–0.1)
BASOPHILS NFR BLD AUTO: 0 % (ref 0–1)
BILIRUB SERPL-MCNC: 0.34 MG/DL (ref 0.2–1)
BUN SERPL-MCNC: 7 MG/DL (ref 5–25)
CALCIUM ALBUM COR SERPL-MCNC: 9.1 MG/DL (ref 8.3–10.1)
CALCIUM SERPL-MCNC: 8.4 MG/DL (ref 8.3–10.1)
CHLORIDE SERPL-SCNC: 114 MMOL/L (ref 100–108)
CO2 SERPL-SCNC: 24 MMOL/L (ref 21–32)
CREAT SERPL-MCNC: 0.63 MG/DL (ref 0.6–1.3)
EOSINOPHIL # BLD AUTO: 0.39 THOUSAND/ΜL (ref 0–0.61)
EOSINOPHIL NFR BLD AUTO: 9 % (ref 0–6)
ERYTHROCYTE [DISTWIDTH] IN BLOOD BY AUTOMATED COUNT: 12.7 % (ref 11.6–15.1)
GFR SERPL CREATININE-BSD FRML MDRD: 122 ML/MIN/1.73SQ M
GLUCOSE SERPL-MCNC: 120 MG/DL (ref 65–140)
HCT VFR BLD AUTO: 35 % (ref 34.8–46.1)
HGB BLD-MCNC: 11.1 G/DL (ref 11.5–15.4)
IMM GRANULOCYTES # BLD AUTO: 0.01 THOUSAND/UL (ref 0–0.2)
IMM GRANULOCYTES NFR BLD AUTO: 0 % (ref 0–2)
LYMPHOCYTES # BLD AUTO: 2.39 THOUSANDS/ΜL (ref 0.6–4.47)
LYMPHOCYTES NFR BLD AUTO: 53 % (ref 14–44)
MCH RBC QN AUTO: 28.5 PG (ref 26.8–34.3)
MCHC RBC AUTO-ENTMCNC: 31.7 G/DL (ref 31.4–37.4)
MCV RBC AUTO: 90 FL (ref 82–98)
MONOCYTES # BLD AUTO: 0.23 THOUSAND/ΜL (ref 0.17–1.22)
MONOCYTES NFR BLD AUTO: 5 % (ref 4–12)
NEUTROPHILS # BLD AUTO: 1.48 THOUSANDS/ΜL (ref 1.85–7.62)
NEUTS SEG NFR BLD AUTO: 33 % (ref 43–75)
NRBC BLD AUTO-RTO: 0 /100 WBCS
PLATELET # BLD AUTO: 151 THOUSANDS/UL (ref 149–390)
PMV BLD AUTO: 9.6 FL (ref 8.9–12.7)
POTASSIUM SERPL-SCNC: 3.8 MMOL/L (ref 3.5–5.3)
PROT SERPL-MCNC: 6.5 G/DL (ref 6.4–8.2)
RBC # BLD AUTO: 3.89 MILLION/UL (ref 3.81–5.12)
SODIUM SERPL-SCNC: 143 MMOL/L (ref 136–145)
WBC # BLD AUTO: 4.52 THOUSAND/UL (ref 4.31–10.16)

## 2021-05-13 PROCEDURE — 99232 SBSQ HOSP IP/OBS MODERATE 35: CPT | Performed by: NURSE PRACTITIONER

## 2021-05-13 PROCEDURE — 99232 SBSQ HOSP IP/OBS MODERATE 35: CPT | Performed by: INTERNAL MEDICINE

## 2021-05-13 PROCEDURE — 71260 CT THORAX DX C+: CPT

## 2021-05-13 PROCEDURE — 70491 CT SOFT TISSUE NECK W/DYE: CPT

## 2021-05-13 PROCEDURE — G1004 CDSM NDSC: HCPCS

## 2021-05-13 PROCEDURE — 80076 HEPATIC FUNCTION PANEL: CPT | Performed by: INTERNAL MEDICINE

## 2021-05-13 PROCEDURE — 99231 SBSQ HOSP IP/OBS SF/LOW 25: CPT | Performed by: INTERNAL MEDICINE

## 2021-05-13 PROCEDURE — 80053 COMPREHEN METABOLIC PANEL: CPT | Performed by: NURSE PRACTITIONER

## 2021-05-13 PROCEDURE — 85025 COMPLETE CBC W/AUTO DIFF WBC: CPT | Performed by: NURSE PRACTITIONER

## 2021-05-13 RX ORDER — BISACODYL 10 MG
10 SUPPOSITORY, RECTAL RECTAL DAILY PRN
Status: DISCONTINUED | OUTPATIENT
Start: 2021-05-13 | End: 2021-05-15 | Stop reason: HOSPADM

## 2021-05-13 RX ORDER — POLYETHYLENE GLYCOL 3350 17 G/17G
17 POWDER, FOR SOLUTION ORAL DAILY
Status: DISCONTINUED | OUTPATIENT
Start: 2021-05-13 | End: 2021-05-15 | Stop reason: HOSPADM

## 2021-05-13 RX ORDER — AMOXICILLIN 250 MG
1 CAPSULE ORAL 2 TIMES DAILY
Status: DISCONTINUED | OUTPATIENT
Start: 2021-05-13 | End: 2021-05-15 | Stop reason: HOSPADM

## 2021-05-13 RX ADMIN — METHOCARBAMOL TABLETS 750 MG: 750 TABLET, COATED ORAL at 11:19

## 2021-05-13 RX ADMIN — HYDROMORPHONE HYDROCHLORIDE 1 MG: 1 INJECTION, SOLUTION INTRAMUSCULAR; INTRAVENOUS; SUBCUTANEOUS at 08:15

## 2021-05-13 RX ADMIN — OXYCODONE HYDROCHLORIDE 10 MG: 10 TABLET ORAL at 15:25

## 2021-05-13 RX ADMIN — METHOCARBAMOL TABLETS 750 MG: 750 TABLET, COATED ORAL at 05:00

## 2021-05-13 RX ADMIN — HYDROMORPHONE HYDROCHLORIDE 1 MG: 1 INJECTION, SOLUTION INTRAMUSCULAR; INTRAVENOUS; SUBCUTANEOUS at 03:29

## 2021-05-13 RX ADMIN — APIXABAN 5 MG: 5 TABLET, FILM COATED ORAL at 08:15

## 2021-05-13 RX ADMIN — OXYCODONE HYDROCHLORIDE 10 MG: 10 TABLET ORAL at 02:57

## 2021-05-13 RX ADMIN — OXYCODONE HYDROCHLORIDE 10 MG: 10 TABLET ORAL at 07:09

## 2021-05-13 RX ADMIN — ASPIRIN 81 MG: 81 TABLET, COATED ORAL at 08:15

## 2021-05-13 RX ADMIN — HYDROMORPHONE HYDROCHLORIDE 1 MG: 1 INJECTION, SOLUTION INTRAMUSCULAR; INTRAVENOUS; SUBCUTANEOUS at 12:43

## 2021-05-13 RX ADMIN — IOHEXOL 60 ML: 350 INJECTION, SOLUTION INTRAVENOUS at 15:05

## 2021-05-13 RX ADMIN — METHOCARBAMOL TABLETS 750 MG: 750 TABLET, COATED ORAL at 17:00

## 2021-05-13 RX ADMIN — HYDROMORPHONE HYDROCHLORIDE 1 MG: 1 INJECTION, SOLUTION INTRAMUSCULAR; INTRAVENOUS; SUBCUTANEOUS at 16:51

## 2021-05-13 RX ADMIN — APIXABAN 5 MG: 5 TABLET, FILM COATED ORAL at 17:00

## 2021-05-13 RX ADMIN — CLONAZEPAM 0.5 MG: 1 TABLET ORAL at 17:00

## 2021-05-13 RX ADMIN — QUETIAPINE FUMARATE 25 MG: 25 TABLET ORAL at 21:06

## 2021-05-13 RX ADMIN — ARIPIPRAZOLE 10 MG: 10 TABLET ORAL at 08:16

## 2021-05-13 RX ADMIN — HYDROMORPHONE HYDROCHLORIDE 1 MG: 1 INJECTION, SOLUTION INTRAMUSCULAR; INTRAVENOUS; SUBCUTANEOUS at 21:43

## 2021-05-13 RX ADMIN — NICOTINE 1 PATCH: 21 PATCH, EXTENDED RELEASE TRANSDERMAL at 08:15

## 2021-05-13 RX ADMIN — OXYCODONE HYDROCHLORIDE 10 MG: 10 TABLET ORAL at 20:39

## 2021-05-13 RX ADMIN — GABAPENTIN 300 MG: 300 CAPSULE ORAL at 08:15

## 2021-05-13 RX ADMIN — CLONAZEPAM 0.5 MG: 1 TABLET ORAL at 08:15

## 2021-05-13 RX ADMIN — OXYCODONE HYDROCHLORIDE 10 MG: 10 TABLET ORAL at 11:19

## 2021-05-13 RX ADMIN — DOCUSATE SODIUM AND SENNOSIDES 1 TABLET: 8.6; 5 TABLET ORAL at 12:43

## 2021-05-13 RX ADMIN — GABAPENTIN 300 MG: 300 CAPSULE ORAL at 17:00

## 2021-05-13 NOTE — PLAN OF CARE
Problem: Potential for Falls  Goal: Patient will remain free of falls  Description: INTERVENTIONS:  - Assess patient frequently for physical needs  -  Identify cognitive and physical deficits and behaviors that affect risk of falls    -  Morris fall precautions as indicated by assessment   - Educate patient/family on patient safety including physical limitations  - Instruct patient to call for assistance with activity based on assessment  - Modify environment to reduce risk of injury  - Consider OT/PT consult to assist with strengthening/mobility  Outcome: Progressing     Problem: PAIN - ADULT  Goal: Verbalizes/displays adequate comfort level or baseline comfort level  Description: Interventions:  - Encourage patient to monitor pain and request assistance  - Assess pain using appropriate pain scale  - Administer analgesics based on type and severity of pain and evaluate response  - Implement non-pharmacological measures as appropriate and evaluate response  - Consider cultural and social influences on pain and pain management  - Notify physician/advanced practitioner if interventions unsuccessful or patient reports new pain  Outcome: Progressing     Problem: INFECTION - ADULT  Goal: Absence or prevention of progression during hospitalization  Description: INTERVENTIONS:  - Assess and monitor for signs and symptoms of infection  - Monitor lab/diagnostic results  - Monitor all insertion sites, i e  indwelling lines, tubes, and drains  - Monitor endotracheal if appropriate and nasal secretions for changes in amount and color  - Morris appropriate cooling/warming therapies per order  - Administer medications as ordered  - Instruct and encourage patient and family to use good hand hygiene technique  - Identify and instruct in appropriate isolation precautions for identified infection/condition  Outcome: Progressing     Problem: DISCHARGE PLANNING  Goal: Discharge to home or other facility with appropriate resources  Description: INTERVENTIONS:  - Identify barriers to discharge w/patient and caregiver  - Arrange for needed discharge resources and transportation as appropriate  - Identify discharge learning needs (meds, wound care, etc )  - Arrange for interpretive services to assist at discharge as needed  - Refer to Case Management Department for coordinating discharge planning if the patient needs post-hospital services based on physician/advanced practitioner order or complex needs related to functional status, cognitive ability, or social support system  Outcome: Progressing

## 2021-05-13 NOTE — PROGRESS NOTES
Progress Note - Acute Pain Service    Arpit Calderon 31 y o  female MRN: [id number]  Unit/Bed#: [facility] 834-01 Encounter: [id number]      Assessment:   Principal Problem:    Phlegmon  Active Problems:    Insomnia    Bipolar 1 disorder (HCC)    Intravenous drug abuse (HCC)    Chronic anticoagulation    Bradycardia    Acute neck pain    Retained foreign body of neck    Arpit Calderon is a 31 y o  female with a history of IVDA/polysubstance abuse presents with acute neck pain, phlegmon and retained foreign body in neck  Plan:   · Robaxin 750 mg every 6 hours scheduled   · Gabapentin 300 mg twice a day   · Tylenol as needed   · Oxycodone 5 mg every 4 hours as needed for moderate pain   · Oxycodone 10 mg every 4 hours as needed for severe pain   · Dilaudid 1 mg IV every 4 hours as needed for breakthrough pain     Bowel management   · Colace 100 mg twice a day  · Senna daily    Substance use disorder  · Injection of cocaine into her neck prior to admission   · On Sublocade injections monthly, patient reports last injection was April 2021   · Prescribed by primary care physician Dr Luz Maria Pelayo    CT scan of chest and soft tissue of neck is ordered  Will continue the above analgesic regimen as outlined above for now  Plan to discontinue IV opioids after imaging is complete if no further workup/procedures are indicated  Discussed with primary care service  [facility] will continue to follow  Please call  / [phone] or [facility] (/ between [phone] and on weekends) with questions or concerns    Pain History  Current pain location(s): neck  Pain Scale:   0-10  Quality: throbbing   24 hour history:   Patient resting in bed, no acute distress  Continues to report pain in her neck that mildly improves with oxycodone and IV Dilaudid as needed  Tolerating diet, no nausea or vomiting    Reporting constipation for which she has to self disimpact;  Reports that she has been doing this since age 17  Opioid requirement previous 24 hours:   IV Dilaudid 2 mg, oxycodone 50 mg    Meds/Allergies   all current active meds have been reviewed and current meds:   Current Facility-Administered Medications   Medication Dose Route Frequency    acetaminophen (TYLENOL) tablet 650 mg  650 mg Oral Q6H PRN    aluminum-magnesium hydroxide-simethicone (MYLANTA) oral suspension 30 mL  30 mL Oral Q6H PRN    apixaban (ELIQUIS) tablet 5 mg  5 mg Oral BID    ARIPiprazole (ABILIFY) tablet 10 mg  10 mg Oral Daily    aspirin (ECOTRIN LOW STRENGTH) EC tablet 81 mg  81 mg Oral Daily    clonazePAM (KlonoPIN) tablet 0 5 mg  0 5 mg Oral BID    docusate sodium (COLACE) capsule 100 mg  100 mg Oral BID PRN    gabapentin (NEURONTIN) capsule 300 mg  300 mg Oral BID    HYDROmorphone (DILAUDID) injection 1 mg  1 mg Intravenous Q4H PRN    methocarbamol (ROBAXIN) tablet 750 mg  750 mg Oral Q6H JEFF    nicotine (NICODERM CQ) 21 mg/24 hr TD 24 hr patch 1 patch  1 patch Transdermal Daily    ondansetron (ZOFRAN) injection 4 mg  4 mg Intravenous Q4H PRN    oxyCODONE (ROXICODONE) immediate release tablet 10 mg  10 mg Oral Q4H PRN    oxyCODONE (ROXICODONE) IR tablet 5 mg  5 mg Oral Q4H PRN    QUEtiapine (SEROquel) tablet 25 mg  25 mg Oral HS    temazepam (RESTORIL) capsule 15 mg  15 mg Oral HS PRN       Allergies   Allergen Reactions    Cat Hair Extract     Dog Epithelium     Latex     Pollen Extract        Objective     Temp:  [97 3 °F (36 3 °C)-98 8 °F (37 1 °C)] 98 8 °F (37 1 °C)  HR:  [40-67] 67  Resp:  [16] 16  BP: ()/(52-66) 110/66    Physical Exam  Vitals signs reviewed  Constitutional:       General: She is awake  She is not in acute distress  Appearance: She is not ill-appearing, toxic-appearing or diaphoretic  HENT:      Head: Normocephalic and atraumatic  Nose: Nose normal    Eyes:      Extraocular Movements: Extraocular movements intact     Pulmonary:      Effort: Pulmonary effort is normal  No tachypnea, bradypnea or respiratory distress  Skin:     Coloration: Skin is pale  Findings: Bruising (neck) present  No rash  Neurological:      Mental Status: She is alert and oriented to person, place, and time  Mental status is at baseline  Psychiatric:         Mood and Affect: Mood is anxious  Speech: Speech normal          Behavior: Behavior normal  Behavior is cooperative  Lab Results:   Results from last 7 days   Lab Units 05/13/21  0720   WBC Thousand/uL 4 52   HEMOGLOBIN g/dL 11 1*   HEMATOCRIT % 35 0   PLATELETS Thousands/uL 151      Results from last 7 days   Lab Units 05/13/21  0720   POTASSIUM mmol/L 3 8   CHLORIDE mmol/L 114*   CO2 mmol/L 24   BUN mg/dL 7   CREATININE mg/dL 0 63   CALCIUM mg/dL 8 4   ALK PHOS U/L 79   ALT U/L 34   AST U/L 18         Please note that the APS provides consultative services regarding pain management only  With the exception of ketamine and epidural infusions and except when indicated, final decisions regarding starting or changing doses of analgesic medications are at the discretion of the consulting service  Off hours consultation and/or medication management is generally not available      BOO Hickman  Acute Pain Service

## 2021-05-13 NOTE — ASSESSMENT & PLAN NOTE
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

## 2021-05-13 NOTE — PROGRESS NOTES
Cardiology Progress Note - Ayla Samson 31 y o  female MRN: [id number]    [facility] Encounter: [id number]      Assessment & Plan:  Principal Problem:    Phlegmon  Active Problems:    Insomnia    Bipolar 1 disorder (HCC)    Intravenous drug abuse (HCC)    Chronic anticoagulation    Bradycardia    Acute neck pain    Retained foreign body of neck       # bradycardia  -  Sinus bradycardia in 30s-40s yesterday  However on ambulation her HR picked up to 90s-100s/min without symptoms  - TTE with EF 45-50%  - No additional dose of clonidine given however it has a long half life  - No indication of pacemaker at this time  - Avoid AV roberta blockers     # phlegmon  - ID consulted  - CT scan showed phlegmon retained IV needle in the neck  - Primary team to get records from [facility]     # IVDU     #Type 1 bipolar disorder  - patient on Abilify, Klonopin and Seroquel       Subjective:   Patient seen and examined  No significant events overnight  Feels tired  Objective:     Vitals: Blood pressure 110/66, pulse 67, temperature 98 8 °F (37 1 °C), resp   rate 16, height 5' 5" (1 651 m), weight 63 5 kg (140 lb), SpO2 100 %, not currently breastfeeding , Body mass index is 23 3 kg/m² ,   Orthostatic Blood Pressures      Most Recent Value   Blood Pressure  110/66 filed at 05/13/2021 0810   Patient Position - Orthostatic VS  Lying filed at 05/12/2021 2311          No intake or output data in the 24 hours ending 05/13/21 0845        Physical Exam:  General: alert, oriented X 3 , comfortable  Neck: No JVD  Cardiac: normal S1, S2, no m/r/g  Respiratory: normal breath sounds, no wheezes or crackles  Abdomen: soft and non-tender  Extremities: warm, no cyanosis, no lower extremity edema, track marks in her arm      Current Facility-Administered Medications:     acetaminophen (TYLENOL) tablet 650 mg, 650 mg, Oral, Q6H PRN, Nara Vicente MD    aluminum-magnesium hydroxide-simethicone (MYLANTA) oral suspension 30 mL, 30 mL, Oral, Q6H PRN, Josi Cabrales MD    apixaban Naylor Ny) tablet 5 mg, 5 mg, Oral, BID, Josi Cabrales MD, 5 mg at 05/13/21 0815    ARIPiprazole (ABILIFY) tablet 10 mg, 10 mg, Oral, Daily, Jsoi Cabrales MD, 10 mg at 05/13/21 0816    aspirin (ECOTRIN LOW STRENGTH) EC tablet 81 mg, 81 mg, Oral, Daily, Josi Cabrales MD, 81 mg at 05/13/21 0815    clonazePAM (KlonoPIN) tablet 0 5 mg, 0 5 mg, Oral, BID, Josi Cabrales MD, 0 5 mg at 05/13/21 0815    docusate sodium (COLACE) capsule 100 mg, 100 mg, Oral, BID PRN, Josi Cabrales MD    gabapentin (NEURONTIN) capsule 300 mg, 300 mg, Oral, BID, BOO Begum, 300 mg at 05/13/21 0815    HYDROmorphone (DILAUDID) injection 1 mg, 1 mg, Intravenous, Q4H PRN, BOO Begum, 1 mg at 05/13/21 0815    methocarbamol (ROBAXIN) tablet 750 mg, 750 mg, Oral, Q6H [address], BOO Begum, 750 mg at 05/13/21 0500    nicotine (NICODERM CQ) 21 mg/24 hr TD 24 hr patch 1 patch, 1 patch, Transdermal, Daily, Josi Cabrales MD, 1 patch at 05/13/21 0815    ondansetron (ZOFRAN) injection 4 mg, 4 mg, Intravenous, Q4H PRN, Josi Cabrales MD    oxyCODONE (ROXICODONE) immediate release tablet 10 mg, 10 mg, Oral, Q4H PRN, BOO Begum, 10 mg at 05/13/21 7525    oxyCODONE (ROXICODONE) IR tablet 5 mg, 5 mg, Oral, Q4H PRN, Josi Cabrales MD, 5 mg at 05/12/21 1139    QUEtiapine (SEROquel) tablet 25 mg, 25 mg, Oral, HS, Josi Cabrales MD, 25 mg at 05/12/21 2216    temazepam (RESTORIL) capsule 15 mg, 15 mg, Oral, HS PRN, Josi Cabrales MD    Labs & Results:    Lab Results   Component Value Date    CKTOTAL 27 09/09/2020    TROPONINI 1 19 (H) 03/17/2021    TROPONINI 1 46 (H) 03/17/2021    TROPONINI 2 22 (H) 03/17/2021       Lab Results   Component Value Date    GLUCOSE 88 09/10/2020    CALCIUM 8 4 05/13/2021    K 3 8 05/13/2021    CO2 24 05/13/2021     (H) 05/13/2021    BUN 7 05/13/2021    CREATININE 0 63 05/13/2021       Lab Results   Component Value Date    WBC 4 52 05/13/2021    HGB 11 1 (L) 05/13/2021    HCT 35 0 05/13/2021    MCV 90 05/13/2021     05/13/2021           No results found for: CHOL  Lab Results   Component Value Date    HDL 45 09/09/2020     Lab Results   Component Value Date    LDLCALC 24 09/09/2020     Lab Results   Component Value Date    TRIG 266 (H) 09/09/2020       Lab Results   Component Value Date    ALT 34 05/13/2021    AST 18 05/13/2021         EKG personally reviewed by )Hugo Cantor MD  No acute changes   TELE: No significant arrhythmias seen on telemetry review

## 2021-05-13 NOTE — PROGRESS NOTES
[facility]  Progress Note - Jonnathan Corriganing 1990, 31 y o  female MRN: [id number]  [facility] Encounter: [id number]  Primary Care Provider: Araceli Noe PA-C   Date and time admitted to hospital: 5/11/2021  9:59 PM    * Ecchymosis of neck  Assessment & Plan  · Patient has known history of substance use and odes inject in her neck  · Recently was in  [facility] for neck pain an d possible swelling concerning  for phlegmon / infection and was on iv abx but she left AMA from there,  · Imaging studies done in [facility] did not reveal foreign body at the neck  However there is soft tissue swelling raising the possibility of neck phlegmon  · Exam more likely correlating with ecchymosis and tenderness without any fluctuance  · CT at [facility] shows swelling and hemorrhage    PLAN:  · Monitor off antibiotics per ID, appreciate input  · Obtaining CT neck and chest with contrast  · Surgery evaluated the patient, no acute surgical intervention needed with  Appreciate input  · Currently on p r n  Oxycodone and Dilaudid for pain along with muscle relaxants and gabapentin but acute Pain Services, appreciate input  ·  consulted for placement    Retained foreign body of neck  Assessment & Plan  · Secondary to IV drug use  · Seen and evaluated initially in March 2021  Surgery evaluated and had no plan for removal at that time  · Given worsening neck pain, and what appears to be worsening inflammatory changes , surgery was reconsulted related not have any additional surgical recommendations to offer  Bradycardia  Assessment & Plan  · Noted to have bradycardia on routine nursing check     · 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   · At times does complain of fatigue and lightheadedness  · Cardiology consulted; appreciate input  · No indication for pacemaker at this time   · Clonidine discontinued    Chronic anticoagulation  Assessment & Plan  · Secondary to acute L arm DVT   · Continue Eliquis 5 mg twice daily     S/P TVR (tricuspid valve repair)  Assessment & Plan  Due to infective endocarditis  In 2020  Continues to report IVDU    Known history of DVT of axillary vein, acute left (HCC)  Assessment & Plan  Continue Eliquis at home dose    Intravenous drug abuse ([facility] [address] )  Assessment & Plan  · With known history of IVDA  · Case management consulted; assistance appreciated with dispo       Bipolar 1 disorder (HCC)  Assessment & Plan  · Continue Abilify 10 mg daily  · Klonopin 0 5 mg twice daily  · Seroquel 25 mg HS  · Supportive care     Insomnia  Assessment & Plan  · Restoril 15 mg daily at night  VTE Pharmacologic Prophylaxis:   Pharmacologic: Apixaban (Eliquis)  Mechanical VTE Prophylaxis in Place: Yes    Patient Centered Rounds: I have performed bedside rounds with nursing staff today  Discussions with Specialists or Other Care Team Provider:  Infectious diseases, acute pain services,     Education and Discussions with Family / Patient:  Discussed plan of care with patient and mother at bedside    Time Spent for Care: 30 minutes  More than 50% of total time spent on counseling and coordination of care as described above  Current Length of Stay: 1 day(s)    Current Patient Status: Inpatient   Certification Statement: The patient will continue to require additional inpatient hospital stay due to Not medically stable     Discharge Plan:  Pending CT results and placement options    Code Status: Level 1 - Full Code      Subjective:   Patient was seen with her daughter and mother at bedside  She reports that her neck pain is currently controlled for now with IV narcotics  Reports constipation      Objective:     Vitals:   Temp (24hrs), Av 2 °F (36 8 °C), Min:97 9 °F (36 6 °C), Max:98 8 °F (37 1 °C)    Temp:  [97 9 °F (36 6 °C)-98 8 °F (37 1 °C)] 98 8 °F (37 1 °C)  HR:  [67] 67  Resp:  [16] 16  BP: (100-144)/(58-66) 110/66  SpO2:  [100 %] 100 %  Body mass index is 23 3 kg/m²  Input and Output Summary (last 24 hours): Intake/Output Summary (Last 24 hours) at 5/13/2021 1445  Last data filed at 5/13/2021 1300  Gross per 24 hour   Intake 420 ml   Output --   Net 420 ml       Physical Exam:     Physical Exam  Constitutional:       General: She is not in acute distress  Eyes:      Pupils: Pupils are equal, round, and reactive to light  Neck:      Comments: Bilateral ecchymoses noted with minimal tenderness and no fluctuance  Cardiovascular:      Rate and Rhythm: Normal rate and regular rhythm  Heart sounds: Normal heart sounds  No murmur  Pulmonary:      Effort: No respiratory distress  Breath sounds: Normal breath sounds  No wheezing or rales  Abdominal:      General: Bowel sounds are normal  There is no distension  Palpations: Abdomen is soft  Tenderness: There is no abdominal tenderness  Musculoskeletal:         General: No swelling  Skin:     General: Skin is warm  Neurological:      Mental Status: She is alert  Comments: Awake alert and communicative         Additional Data:     Labs:    Results from last 7 days   Lab Units 05/13/21  0720   WBC Thousand/uL 4 52   HEMOGLOBIN g/dL 11 1*   HEMATOCRIT % 35 0   PLATELETS Thousands/uL 151   NEUTROS PCT % 33*   LYMPHS PCT % 53*   MONOS PCT % 5   EOS PCT % 9*     Results from last 7 days   Lab Units 05/13/21  0720   SODIUM mmol/L 143   POTASSIUM mmol/L 3 8   CHLORIDE mmol/L 114*   CO2 mmol/L 24   BUN mg/dL 7   CREATININE mg/dL 0 63   ANION GAP mmol/L 5   CALCIUM mg/dL 8 4   ALBUMIN g/dL 3 1*   TOTAL BILIRUBIN mg/dL 0 34   ALK PHOS U/L 79   ALT U/L 34   AST U/L 18   GLUCOSE RANDOM mg/dL 120                 Results from last 7 days   Lab Units 05/12/21  0615   PROCALCITONIN ng/ml <0 05           * I Have Reviewed All Lab Data Listed Above  * Additional Pertinent Lab Tests Reviewed:  All Labs Within Last 24 Hours Reviewed    Imaging:    CT soft tissue neck w contrast    (Results Pending)   CT chest w contrast    (Results Pending)       Recent Cultures (last 7 days):           Last 24 Hours Medication List:   Current Facility-Administered Medications   Medication Dose Route Frequency Provider Last Rate    acetaminophen  650 mg Oral Q6H PRN Sidney Jimenez MD      aluminum-magnesium hydroxide-simethicone  30 mL Oral Q6H PRN Sidney Jimenez MD      apixaban  5 mg Oral BID Sidney Jimenez MD      ARIPiprazole  10 mg Oral Daily Sidney Jimenez MD      aspirin  81 mg Oral Daily Sidney Jimenez MD      bisacodyl  10 mg Rectal Daily PRN Tacho Gagnon MD      clonazePAM  0 5 mg Oral BID Sidney Jimenez MD      gabapentin  300 mg Oral BID [employer], CRNP      HYDROmorphone  1 mg Intravenous Q4H PRN [employer], CRNP      methocarbamol  750 mg Oral Q6H [facility] [employer], CRNP      nicotine  1 patch Transdermal Daily Sidney Jimenez MD      ondansetron  4 mg Intravenous Q4H PRN Sidney Jimenez MD      oxyCODONE  10 mg Oral Q4H PRN [employer], CRNP      oxyCODONE  5 mg Oral Q4H PRN Sidney Jimenez MD      polyethylene glycol  17 g Oral Daily Tacho Gagnon MD      QUEtiapine  25 mg Oral HS Sidney Jimenez MD      senna-docusate sodium  1 tablet Oral BID Tacho Gagnon MD      temazepam  15 mg Oral HS PRN Sidney Jimenez MD          Today, Patient Was Seen By: Tacho Gagnon MD    ** Please Note: Dictation voice to text software may have been used in the creation of this document   **

## 2021-05-13 NOTE — PROGRESS NOTES
Progress Note - Infectious Disease   Beni Christine 31 y o  female MRN: [id number]  Unit/Bed#: [facility] 834-01 Encounter: [id number]      Impression/Recommendations:  1  Bilateral neck ecchymosis  Due to IVDU, with reported miss in right neck in past week  No evidence for acute infection on exam   Swelling and hemorrhage seen on CTA at [facility]  Suspect foci of air due to injection as opposed to abscess given bland exam   Blood cultures from [facility] reportedly negative  Clinically stable without sepsis  Rec:  ? Continue to follow closely off antibiotics  ? Check repeat CT neck/chest with IV contrast  ? Follow temperatures closely  ? Obtain final blood culture results from [facility]  ? Supportive care as per the primary service     2  IVDU  Active  Risk factor for above  Rec:  ? Needs cessation  ? Case management consult pending  ? Acute pain service follow-up ongoing  ? Monitor closely for withdrawal     3  High risk sexual exposure  Previously reported unprotected vaginal intercourse in exchange for drugs  Previously placed on PEP but reported noncompliance in the chart  HIV screen 05/10/2021 negative  Reports no ongoing high-risk exposures  Rec:  ? No further indication for PEP     The above impression and plan was discussed in detail with Dr Farida Fernandes      Antibiotics:  Off antibiotics #1    Subjective:  Patient seen on AM rounds  Still has mild right neck pain  Motivated to get clean  Denies fevers, chills, sweats, nausea, vomiting, or diarrhea  24 Hour Events:  No documented fevers, chills, sweats, nausea, vomiting, or diarrhea      Objective:  Vitals:  Temp:  [97 9 °F (36 6 °C)-98 8 °F (37 1 °C)] 98 8 °F (37 1 °C)  HR:  [67] 67  Resp:  [16] 16  BP: (100-144)/(58-66) 110/66  SpO2:  [100 %] 100 %  Temp (24hrs), Av 2 °F (36 8 °C), Min:97 9 °F (36 6 °C), Max:98 8 °F (37 1 °C)  Current: Temperature: 98 8 °F (37 1 °C)    Physical Exam:   General:  No acute distress  Psychiatric:  Awake and alert  Neck:  Stable bruising bilateral anterior neck with mild tenderness, no warmth, erythema, or fluctance  Pulmonary:  Normal respiratory excursion without accessory muscle use  Abdomen:  Soft, nontender  Extremities:  No edema  Skin:  No rashes    Lab Results:  I have personally reviewed pertinent labs  Results from last 7 days   Lab Units 05/13/21  0720 05/12/21  0615 05/11/21  2247   POTASSIUM mmol/L 3 8 3 5 3 4*   CHLORIDE mmol/L 114* 115* 110*   CO2 mmol/L 24 26 30   BUN mg/dL 7 7 8   CREATININE mg/dL 0 63 0 68 0 64   EGFR ml/min/1 73sq m 122 119 122   CALCIUM mg/dL 8 4 8 0* 8 6   AST U/L 18 26  --    ALT U/L 34 36  --    ALK PHOS U/L 79 66  --      Results from last 7 days   Lab Units 05/13/21  0720 05/12/21  0615 05/11/21  2247   WBC Thousand/uL 4 52 6 47 4 99   HEMOGLOBIN g/dL 11 1* 10 5* 11 3*   PLATELETS Thousands/uL 151 200 212           Imaging Studies:   I have personally reviewed pertinent imaging study reports and images in PACS  EKG, Pathology, and Other Studies:   I have personally reviewed pertinent reports

## 2021-05-13 NOTE — QUICK NOTE
Patient belongings brought from home per mom  I assessed and went through bag of belongings prior to giving to patient  Mom and daughter present in room during assessment  Patient pleasant and cooperative  Encouraged to make needs known, call bell within reach

## 2021-05-13 NOTE — ASSESSMENT & PLAN NOTE
· Secondary to IV drug use  · Seen and evaluated initially in March 2021  Surgery evaluated and had no plan for removal at that time  · Given worsening neck pain, and what appears to be worsening inflammatory changes , surgery was reconsulted related not have any additional surgical recommendations to offer

## 2021-05-13 NOTE — CASE MANAGEMENT
LOS 1d  No bundle no readmit       Pt was reported to be homeless by Dr Mikhail Jaime  CM met ith patient she stated she can go stay at her boyfriends at d/c  She provided no information to his living arrangements  She has her car in the ED parking lot  Declined shelter information  Pt is no longer allowed to go to her parents home  Her 3year old daughter lives with her parents  Pt has Hx of IV Heroine use, THC, Meth, cocaine and Benzo's  Pt has no Living will or POA  Pt requested a housing application for [facility]  CM provided a copy  No DME  Has transport at d/c  Consent for Host referral signed and placed in [facility] for Medical records  Called in referral and faxed H&P, Med list and face sheet to [phone]

## 2021-05-13 NOTE — ASSESSMENT & PLAN NOTE
· Patient has known history of substance use and odes inject in her neck  · Recently was in  [facility] for neck pain an d possible swelling concerning  for phlegmon / infection and was on iv abx but she left AMA from there,  · Imaging studies done in [facility] did not reveal foreign body at the neck  However there is soft tissue swelling raising the possibility of neck phlegmon  · Exam more likely correlating with ecchymosis and tenderness without any fluctuance  · CT at [place] shows swelling and hemorrhage    PLAN:  · Monitor off antibiotics per ID, appreciate input  · Obtaining CT neck and chest with contrast  · Surgery evaluated the patient, no acute surgical intervention needed with  Appreciate input  · Currently on p r n  Oxycodone and Dilaudid for pain along with muscle relaxants and gabapentin but acute Pain Services, appreciate input    ·  consulted for placement

## 2021-05-14 ENCOUNTER — APPOINTMENT (INPATIENT)
Dept: RADIOLOGY | Facility: HOSPITAL | Age: 31
DRG: 383 | End: 2021-05-14
Payer: COMMERCIAL

## 2021-05-14 PROBLEM — K82.8 THICKENING OF WALL OF GALLBLADDER: Status: ACTIVE | Noted: 2021-05-14

## 2021-05-14 LAB
ALBUMIN SERPL BCP-MCNC: 3.1 G/DL (ref 3.5–5)
ALP SERPL-CCNC: 78 U/L (ref 46–116)
ALT SERPL W P-5'-P-CCNC: 35 U/L (ref 12–78)
AST SERPL W P-5'-P-CCNC: 19 U/L (ref 5–45)
BILIRUB DIRECT SERPL-MCNC: 0.11 MG/DL (ref 0–0.2)
BILIRUB SERPL-MCNC: 0.33 MG/DL (ref 0.2–1)
PROT SERPL-MCNC: 6.5 G/DL (ref 6.4–8.2)

## 2021-05-14 PROCEDURE — 99232 SBSQ HOSP IP/OBS MODERATE 35: CPT | Performed by: INTERNAL MEDICINE

## 2021-05-14 PROCEDURE — 76705 ECHO EXAM OF ABDOMEN: CPT

## 2021-05-14 RX ADMIN — CLONAZEPAM 0.5 MG: 1 TABLET ORAL at 17:00

## 2021-05-14 RX ADMIN — GABAPENTIN 300 MG: 300 CAPSULE ORAL at 09:39

## 2021-05-14 RX ADMIN — METHOCARBAMOL TABLETS 750 MG: 750 TABLET, COATED ORAL at 23:00

## 2021-05-14 RX ADMIN — DOCUSATE SODIUM AND SENNOSIDES 1 TABLET: 8.6; 5 TABLET ORAL at 17:00

## 2021-05-14 RX ADMIN — HYDROMORPHONE HYDROCHLORIDE 1 MG: 1 INJECTION, SOLUTION INTRAMUSCULAR; INTRAVENOUS; SUBCUTANEOUS at 03:29

## 2021-05-14 RX ADMIN — ARIPIPRAZOLE 10 MG: 10 TABLET ORAL at 09:39

## 2021-05-14 RX ADMIN — METHOCARBAMOL TABLETS 750 MG: 750 TABLET, COATED ORAL at 11:05

## 2021-05-14 RX ADMIN — HYDROMORPHONE HYDROCHLORIDE 1 MG: 1 INJECTION, SOLUTION INTRAMUSCULAR; INTRAVENOUS; SUBCUTANEOUS at 16:21

## 2021-05-14 RX ADMIN — HYDROMORPHONE HYDROCHLORIDE 1 MG: 1 INJECTION, SOLUTION INTRAMUSCULAR; INTRAVENOUS; SUBCUTANEOUS at 21:01

## 2021-05-14 RX ADMIN — APIXABAN 5 MG: 5 TABLET, FILM COATED ORAL at 09:39

## 2021-05-14 RX ADMIN — OXYCODONE HYDROCHLORIDE 10 MG: 10 TABLET ORAL at 19:53

## 2021-05-14 RX ADMIN — CLONAZEPAM 0.5 MG: 1 TABLET ORAL at 09:39

## 2021-05-14 RX ADMIN — OXYCODONE HYDROCHLORIDE 10 MG: 10 TABLET ORAL at 01:38

## 2021-05-14 RX ADMIN — OXYCODONE HYDROCHLORIDE 10 MG: 10 TABLET ORAL at 11:03

## 2021-05-14 RX ADMIN — APIXABAN 5 MG: 5 TABLET, FILM COATED ORAL at 17:00

## 2021-05-14 RX ADMIN — METHOCARBAMOL TABLETS 750 MG: 750 TABLET, COATED ORAL at 17:00

## 2021-05-14 RX ADMIN — QUETIAPINE FUMARATE 25 MG: 25 TABLET ORAL at 21:01

## 2021-05-14 RX ADMIN — OXYCODONE HYDROCHLORIDE 10 MG: 10 TABLET ORAL at 05:50

## 2021-05-14 RX ADMIN — OXYCODONE HYDROCHLORIDE 10 MG: 10 TABLET ORAL at 15:03

## 2021-05-14 RX ADMIN — DOCUSATE SODIUM AND SENNOSIDES 1 TABLET: 8.6; 5 TABLET ORAL at 09:39

## 2021-05-14 RX ADMIN — TEMAZEPAM 15 MG: 15 CAPSULE ORAL at 22:50

## 2021-05-14 RX ADMIN — GABAPENTIN 300 MG: 300 CAPSULE ORAL at 17:00

## 2021-05-14 RX ADMIN — NICOTINE 1 PATCH: 21 PATCH, EXTENDED RELEASE TRANSDERMAL at 09:41

## 2021-05-14 RX ADMIN — ASPIRIN 81 MG: 81 TABLET, COATED ORAL at 09:39

## 2021-05-14 RX ADMIN — HYDROMORPHONE HYDROCHLORIDE 1 MG: 1 INJECTION, SOLUTION INTRAMUSCULAR; INTRAVENOUS; SUBCUTANEOUS at 07:31

## 2021-05-14 RX ADMIN — METHOCARBAMOL TABLETS 750 MG: 750 TABLET, COATED ORAL at 05:49

## 2021-05-14 RX ADMIN — HYDROMORPHONE HYDROCHLORIDE 1 MG: 1 INJECTION, SOLUTION INTRAMUSCULAR; INTRAVENOUS; SUBCUTANEOUS at 12:15

## 2021-05-14 NOTE — PROGRESS NOTES
[address] [facility]  Progress Note - Arpit Calderon 1990, 31 y o  female MRN: [id number]  Unit/Bed#: [facility] 834-01 Encounter: [id number]  Primary Care Provider: Angel Romero PA-C   Date and time admitted to hospital: 5/11/2021  9:59 PM    * Ecchymosis of neck  Assessment & Plan  · Patient has known history of substance use and odes inject in her neck  · Recently was in  [facility] for neck pain an d possible swelling concerning  for phlegmon / infection and was on iv abx but she left AMA from there,  · Imaging studies done in [facility] did not reveal foreign body at the neck  However there is soft tissue swelling raising the possibility of neck phlegmon  · Exam more likely correlating with ecchymosis and tenderness without any fluctuance  · CT at [facility] shows swelling and hemorrhage  · CT neck here shows retained needle with surrounding swelling and focal tiny hemorrhage  but no major hemorrhage or abscess  Findings along the left subclavian vein noted which are likely secondary to self drug injection  · Gallbladder wall edema noted    PLAN:  · Monitor off antibiotics per ID, appreciate input  · Discussed with surgery team regarding CT scan findings, no acute intervention needed  Appreciate input  · Currently on p r n  Oxycodone and Dilaudid for pain along with muscle relaxants and gabapentin but acute Pain Services, appreciate input  ·  consulted for placement    Thickening of wall of gallbladder and right upper quadrant tenderness  Assessment & Plan  Obtain right upper quadrant ultrasound  Obtain hepatic function panel    Retained foreign body of neck  Assessment & Plan  · Secondary to IV drug use  · Seen and evaluated initially in March 2021   Surgery evaluated and had no plan for removal at that time  · Given worsening neck pain, and what appears to be worsening inflammatory changes , surgery was reconsulted related not have any additional surgical recommendations to offer  Bradycardia  Assessment & Plan  · Noted to have bradycardia on routine nursing check  · 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   · At times does complain of fatigue and lightheadedness  · Cardiology consulted; appreciate input  · No indication for pacemaker at this time   · Clonidine discontinued    Chronic anticoagulation  Assessment & Plan  · Secondary to acute L arm DVT   · Continue Eliquis 5 mg twice daily     S/P TVR (tricuspid valve repair)  Assessment & Plan  Due to infective endocarditis  In 2020  Continues to report IVDU    Known history of DVT of axillary vein, acute left (HCC)  Assessment & Plan  Continue Eliquis at home dose    Intravenous drug abuse ([facility] [address] )  Assessment & Plan  · With known history of IVDA  · Case management consulted; assistance appreciated with dispo       Bipolar 1 disorder (HCC)  Assessment & Plan  · Continue Abilify 10 mg daily  · Klonopin 0 5 mg twice daily  · Seroquel 25 mg HS  · Supportive care     Insomnia  Assessment & Plan  · Restoril 15 mg daily at night  VTE Pharmacologic Prophylaxis:   Pharmacologic: Apixaban (Eliquis)  Mechanical VTE Prophylaxis in Place: Yes    Patient Centered Rounds: I have performed bedside rounds with nursing staff today  Discussions with Specialists or Other Care Team Provider:  Surgery, ID, acute Pain Services    Education and Discussions with Family / Patient:  Discussed plan of care with the patient    Time Spent for Care: 30 minutes  More than 50% of total time spent on counseling and coordination of care as described above      Current Length of Stay: 2 day(s)    Current Patient Status: Inpatient   Certification Statement: The patient will continue to require additional inpatient hospital stay due to Not medically stable, awaiting right upper quadrant ultrasound    Discharge Plan:  Home when stable    Code Status: Level 1 - Full Code      Subjective:   No overnight events reported  Patient reports neck pain  Initially denied abdominal pain although had right upper quadrant tenderness on exam     Objective:     Vitals:   Temp (24hrs), Av 9 °F (36 6 °C), Min:97 4 °F (36 3 °C), Max:98 1 °F (36 7 °C)    Temp:  [97 4 °F (36 3 °C)-98 1 °F (36 7 °C)] 97 4 °F (36 3 °C)  HR:  [57-65] 65  Resp:  [16-20] 16  BP: ()/(49-73) 114/73  SpO2:  [97 %-98 %] 97 %  Body mass index is 23 3 kg/m²  Input and Output Summary (last 24 hours): Intake/Output Summary (Last 24 hours) at 2021 1706  Last data filed at 2021 0810  Gross per 24 hour   Intake 120 ml   Output --   Net 120 ml       Physical Exam:     Physical Exam  Constitutional:       General: She is not in acute distress  Eyes:      Pupils: Pupils are equal, round, and reactive to light  Neck:      Comments: Bilateral ecchymoses noted with minimal tenderness and no fluctuance  Cardiovascular:      Rate and Rhythm: Normal rate and regular rhythm  Heart sounds: Normal heart sounds  No murmur  Pulmonary:      Effort: No respiratory distress  Breath sounds: Normal breath sounds  No wheezing or rales  Abdominal:      General: Bowel sounds are normal  There is no distension  Palpations: Abdomen is soft  Tenderness:  Right upper quadrant tenderness noted   Musculoskeletal:         General: No swelling  Skin:     General: Skin is warm  Neurological:      Mental Status: She is alert        Comments: Awake alert and communicative         Additional Data:     Labs:    Results from last 7 days   Lab Units 21  0720   WBC Thousand/uL 4 52   HEMOGLOBIN g/dL 11 1*   HEMATOCRIT % 35 0   PLATELETS Thousands/uL 151   NEUTROS PCT % 33*   LYMPHS PCT % 53*   MONOS PCT % 5   EOS PCT % 9*     Results from last 7 days   Lab Units 21  0720   SODIUM mmol/L 143   POTASSIUM mmol/L 3 8   CHLORIDE mmol/L 114*   CO2 mmol/L 24   BUN mg/dL 7   CREATININE mg/dL 0 63   ANION GAP mmol/L 5   CALCIUM mg/dL 8 4 ALBUMIN g/dL 3 1*  3 1*   TOTAL BILIRUBIN mg/dL 0 33  0 34   ALK PHOS U/L 78  79   ALT U/L 35  34   AST U/L 19  18   GLUCOSE RANDOM mg/dL 120                 Results from last 7 days   Lab Units 05/12/21  0615   PROCALCITONIN ng/ml <0 05           * I Have Reviewed All Lab Data Listed Above  * Additional Pertinent Lab Tests Reviewed: All Labs Within Last 24 Hours Reviewed    Imaging:    CT soft tissue neck w contrast   Final Result by Jazzy Mir MD (05/13 1647)      Retained needle within the left supraclavicular fossa a trace amount of regional blood products and overlying skin thickening  Otherwise no large focal hematoma or abscess formation on the current exam       Multiple radiopaque densities noted adjacent to the left subclavian vein, within the subscapularis musculature and adjacent to the left proximal humerus  Findings are new since the prior CT and may in part be secondary to injected particulate matter  I personally discussed this study with Roseanne Damon on 5/13/2021 at 4:47 PM                Workstation performed: [id number]         CT chest w contrast   Final Result by Jazzy Mir MD (05/13 4527)      Interval improvement in previously seen multifocal peripherally situated septic emboli  No new areas of focal airspace consolidation identified  Stable nodules noted in the left lung favored to be on the basis of residual scar with additional areas of linear scar noted in the right lung  Attention on follow-up examinations is recommended  Small amount of high density pericardial fluid/hemorrhage, as reported on recent CT  Findings are new since prior chest CT from 9/4/2020  Partially imaged gallbladder wall edema should be correlated with ultrasound  Correlate with separate CT neck report for the remainder the findings            I personally discussed this study with Roseanne Damon on 5/13/2021 at 4:40 PM                 Workstation performed: [id number] US right upper quadrant    (Results Pending)       Recent Cultures (last 7 days):           Last 24 Hours Medication List:   Current Facility-Administered Medications   Medication Dose Route Frequency Provider Last Rate    acetaminophen  650 mg Oral Q6H PRN Konrad Mills MD      aluminum-magnesium hydroxide-simethicone  30 mL Oral Q6H PRN Konrad Mills MD      apixaban  5 mg Oral BID Konrad Mills MD      ARIPiprazole  10 mg Oral Daily Konrad Mills MD      aspirin  81 mg Oral Daily Konrad Mills MD      bisacodyl  10 mg Rectal Daily PRN Ivonne Ya MD      clonazePAM  0 5 mg Oral BID Konrad Mills MD      gabapentin  300 mg Oral BID [facility], CRNP      HYDROmorphone  1 mg Intravenous Q4H PRN [facility], BOO      methocarbamol  750 mg Oral Q6H [address] [facility], CRNP      nicotine  1 patch Transdermal Daily Konrad Mills MD      ondansetron  4 mg Intravenous Q4H PRN Konrad Mills MD      oxyCODONE  10 mg Oral Q4H PRN [employer], BOO      oxyCODONE  5 mg Oral Q4H PRN Konrad Mills MD      polyethylene glycol  17 g Oral Daily Ivonne Ya MD      QUEtiapine  25 mg Oral HS Konrad Mills MD      senna-docusate sodium  1 tablet Oral BID Ivonne Ya MD      temazepam  15 mg Oral HS PRN Konrad Mills MD          Today, Patient Was Seen By: Ivonne Ya MD    ** Please Note: Dictation voice to text software may have been used in the creation of this document   **

## 2021-05-14 NOTE — PLAN OF CARE
Problem: Potential for Falls  Goal: Patient will remain free of falls  Description: INTERVENTIONS:  - Assess patient frequently for physical needs  -  Identify cognitive and physical deficits and behaviors that affect risk of falls    -  [place] fall precautions as indicated by assessment   - Educate patient/family on patient safety including physical limitations  - Instruct patient to call for assistance with activity based on assessment  - Modify environment to reduce risk of injury  - Consider OT/PT consult to assist with strengthening/mobility  Outcome: Progressing     Problem: PAIN - ADULT  Goal: Verbalizes/displays adequate comfort level or baseline comfort level  Description: Interventions:  - Encourage patient to monitor pain and request assistance  - Assess pain using appropriate pain scale  - Administer analgesics based on type and severity of pain and evaluate response  - Implement non-pharmacological measures as appropriate and evaluate response  - Consider cultural and social influences on pain and pain management  - Notify physician/advanced practitioner if interventions unsuccessful or patient reports new pain  Outcome: Progressing     Problem: INFECTION - ADULT  Goal: Absence or prevention of progression during hospitalization  Description: INTERVENTIONS:  - Assess and monitor for signs and symptoms of infection  - Monitor lab/diagnostic results  - Monitor all insertion sites, i e  indwelling lines, tubes, and drains  - Monitor endotracheal if appropriate and nasal secretions for changes in amount and color  - [place] appropriate cooling/warming therapies per order  - Administer medications as ordered  - Instruct and encourage patient and family to use good hand hygiene technique  - Identify and instruct in appropriate isolation precautions for identified infection/condition  Outcome: Progressing     Problem: DISCHARGE PLANNING  Goal: Discharge to home or other facility with appropriate resources  Description: INTERVENTIONS:  - Identify barriers to discharge w/patient and caregiver  - Arrange for needed discharge resources and transportation as appropriate  - Identify discharge learning needs (meds, wound care, etc )  - Arrange for interpretive services to assist at discharge as needed  - Refer to Case Management Department for coordinating discharge planning if the patient needs post-hospital services based on physician/advanced practitioner order or complex needs related to functional status, cognitive ability, or social support system  Outcome: Progressing

## 2021-05-14 NOTE — PROGRESS NOTES
Progress Note - Acute Pain Service    David Ordoñez 31 y o  female MRN: [id number]  Unit/Bed#: [facility] 834-01 Encounter: [id number]      Assessment:   Principal Problem:    Ecchymosis of neck  Active Problems:    Insomnia    Bipolar 1 disorder (HCC)    Intravenous drug abuse ([address] )    Known history of DVT of axillary vein, acute left (HCC)    S/P TVR (tricuspid valve repair)    Chronic anticoagulation    Bradycardia    Acute neck pain    Retained foreign body of neck    David Ordoñez is a 31 y o  female with a history of IVDA/polysubstance abuse presents with acute neck pain, phlegmon and retained foreign body in neck  Plan:   · Robaxin 750 mg every 6 hours scheduled  · Gabapentin 300 mg twice a day   · Continue Tylenol as needed  · Oxycodone 5 mg every 4 hours as needed for moderate pain   · Oxycodone 10 mg every 4 hours as needed for severe pain   · Dilaudid 1 mg IV every 4 hours as needed for breakthrough pain   · Discontinue IV Dilaudid after abdominal ultrasound today if no further work-up is indicated   · Once IV Dilaudid has been discontinued, start to wean Oxycodone as tolerated    Bowel management   · Colace 100 mg twice a day   · Senna daily    Substance use disorder   · Injection of cocaine into her neck prior to admission   · On Sublocade injections monthly, patient reports last injection was April 2021  · Prescribed by primary care physician Dr oKrina Bellamy    Abdominal ultrasound is ordered  We will continue the above analgesic regimen as outlined above for now  Plans discontinue IV opioids after imaging is complete and no further workup/procedures are indicated  Discussed with primary care service  APS will continue to follow   Please call  / [phone] or [place] [facility] - [facility] (/ between [phone] and on weekends) with questions or concerns    Pain History  Current pain location(s): neck and abdomen   Pain Scale:   10  24 hour history:  Patient ambulating in [place]  Continues to report severe pain in the right side of her neck, mild pain in her abdomen  P r n  oxycodone/ IV Dilaudid provides mild improvement in pain  Tolerating current medication regimen      Opioid requirement previous 24 hours:  IV Dilaudid 5 mg, oxycodone 50 mg    Meds/Allergies   all current active meds have been reviewed and current meds:   Current Facility-Administered Medications   Medication Dose Route Frequency    acetaminophen (TYLENOL) tablet 650 mg  650 mg Oral Q6H PRN    aluminum-magnesium hydroxide-simethicone (MYLANTA) oral suspension 30 mL  30 mL Oral Q6H PRN    apixaban (ELIQUIS) tablet 5 mg  5 mg Oral BID    ARIPiprazole (ABILIFY) tablet 10 mg  10 mg Oral Daily    aspirin (ECOTRIN LOW STRENGTH) EC tablet 81 mg  81 mg Oral Daily    bisacodyl (DULCOLAX) rectal suppository 10 mg  10 mg Rectal Daily PRN    clonazePAM (KlonoPIN) tablet 0 5 mg  0 5 mg Oral BID    gabapentin (NEURONTIN) capsule 300 mg  300 mg Oral BID    HYDROmorphone (DILAUDID) injection 1 mg  1 mg Intravenous Q4H PRN    methocarbamol (ROBAXIN) tablet 750 mg  750 mg Oral Q6H JEFF    nicotine (NICODERM CQ) 21 mg/24 hr TD 24 hr patch 1 patch  1 patch Transdermal Daily    ondansetron (ZOFRAN) injection 4 mg  4 mg Intravenous Q4H PRN    oxyCODONE (ROXICODONE) immediate release tablet 10 mg  10 mg Oral Q4H PRN    oxyCODONE (ROXICODONE) IR tablet 5 mg  5 mg Oral Q4H PRN    polyethylene glycol (MIRALAX) packet 17 g  17 g Oral Daily    QUEtiapine (SEROquel) tablet 25 mg  25 mg Oral HS    senna-docusate sodium (SENOKOT S) 8 6-50 mg per tablet 1 tablet  1 tablet Oral BID    temazepam (RESTORIL) capsule 15 mg  15 mg Oral HS PRN       Allergies   Allergen Reactions    Cat Hair Extract     Dog Epithelium     Latex     Pollen Extract        Objective     Temp:  [97 7 °F (36 5 °C)-98 1 °F (36 7 °C)] 98 1 °F (36 7 °C)  HR:  [57-65] 65  Resp:  [18-20] 20  BP: ()/(49-71) 99/49    Physical Exam  Vitals signs reviewed  Constitutional:       General: She is awake  She is not in acute distress  Appearance: She is not ill-appearing or toxic-appearing  HENT:      Head: Normocephalic and atraumatic  Nose: Nose normal    Pulmonary:      Effort: Pulmonary effort is normal  No tachypnea, bradypnea or respiratory distress  Skin:     Findings: Bruising (neck) present  No rash  Neurological:      Mental Status: She is alert and oriented to person, place, and time  Mental status is at baseline  Psychiatric:         Attention and Perception: Attention normal          Mood and Affect: Mood normal  Mood is not anxious  Speech: Speech normal          Behavior: Behavior normal  Behavior is not agitated  Behavior is cooperative  Lab Results:   Results from last 7 days   Lab Units 05/13/21  0720   WBC Thousand/uL 4 52   HEMOGLOBIN g/dL 11 1*   HEMATOCRIT % 35 0   PLATELETS Thousands/uL 151      Results from last 7 days   Lab Units 05/13/21  0720   POTASSIUM mmol/L 3 8   CHLORIDE mmol/L 114*   CO2 mmol/L 24   BUN mg/dL 7   CREATININE mg/dL 0 63   CALCIUM mg/dL 8 4   ALK PHOS U/L 78  79   ALT U/L 35  34   AST U/L 19  18       Imaging Studies: I have personally reviewed pertinent reports  Please note that the APS provides consultative services regarding pain management only  With the exception of ketamine and epidural infusions and except when indicated, final decisions regarding starting or changing doses of analgesic medications are at the discretion of the consulting service  Off hours consultation and/or medication management is generally not available      BOO Beckham  Acute Pain Service

## 2021-05-14 NOTE — ASSESSMENT & PLAN NOTE
· Patient has known history of substance use and odes inject in her neck  · Recently was in  [facility] for neck pain an d possible swelling concerning  for phlegmon / infection and was on iv abx but she left AMA from there,  · Imaging studies done in [facility] did not reveal foreign body at the neck  However there is soft tissue swelling raising the possibility of neck phlegmon  · Exam more likely correlating with ecchymosis and tenderness without any fluctuance  · CT at [facility] shows swelling and hemorrhage  · CT neck here shows retained needle with surrounding swelling and focal tiny hemorrhage  but no major hemorrhage or abscess  Findings along the left subclavian vein noted which are likely secondary to self drug injection  · Gallbladder wall edema noted    PLAN:  · Monitor off antibiotics per ID, appreciate input  · Discussed with surgery team regarding CT scan findings, no acute intervention needed  Appreciate input  · Currently on p r n  Oxycodone and Dilaudid for pain along with muscle relaxants and gabapentin but acute Pain Services, appreciate input    ·  consulted for placement

## 2021-05-14 NOTE — CASE MANAGEMENT
Pt refusing HOST services  Per Saleem Spencer at host  Pt keeps disconnecting their calls both on the Hospital phone and on her cell phone

## 2021-05-14 NOTE — PROGRESS NOTES
Progress Note - Infectious Disease   Edmar Rosales 31 y o  female MRN: [id number]  Unit/Bed#: [facility] 834-01 Encounter: [id number]      Impression/Recommendations:  1   Bilateral neck ecchymosis   Due to IVDU, with reported miss in right neck in past week   No evidence for acute infection on exam   Swelling and hemorrhage seen on CTA at [facility] 8141 CT neck/chest with contrast here negative for infection   Blood cultures from [facility] 5/10 confirmed with labs  at 12:51 PM to be still negative   Clinically stable without sepsis  Rec:  ? OK from ID for D/C off antibiotics  ? Needs drug cessation as below     2   IVDU   Active   Risk factor for above  Rec:  ? Needs cessation     3   High risk sexual exposure   Previously reported unprotected vaginal intercourse in exchange for drugs   Previously placed on PEP but reported noncompliance in the chart   HIV screen 05/10/2021 negative  Reports no ongoing high-risk exposures  Rec:  ? No further indication for PEP     4  GB wall edema  Seen icidentally on CT chest   No focal tenderness on my exam   LFTs normal   Low clinical suspicion for cholecystitis  Rec:  · Follow up RUQ U/S per primary service    The above impression and plan was discussed in detail with the patient and Dr Patrice Stearns      Antibiotics:  Off antibiotics #2    Subjective:  Patient seen on AM rounds  Still has pain in right neck  Denies fevers, chills, sweats, nausea, vomiting, or diarrhea  24 Hour Events:  No documented fevers, chills, sweats, nausea, vomiting, or diarrhea      Objective:  Vitals:  Temp:  [97 7 °F (36 5 °C)-98 1 °F (36 7 °C)] 98 1 °F (36 7 °C)  HR:  [57-65] 65  Resp:  [18-20] 20  BP: ()/(49-71) 99/49  SpO2:  [97 %-99 %] 97 %  Temp (24hrs), Av °F (36 7 °C), Min:97 7 °F (36 5 °C), Max:98 1 °F (36 7 °C)  Current: Temperature: 98 1 °F (36 7 °C)    Physical Exam:   General:  No acute distress, ambulating around room  Psychiatric:  Awake and alert  Pulmonary: Normal respiratory excursion without accessory muscle use  Neck:  Stable evolving ecchymosis R>L anterior neck without warmth, fluctuance or cellulitis  Abdomen:  Soft, nontender  Extremities:  No edema  Skin:  No rashes    Lab Results:  I have personally reviewed pertinent labs  Results from last 7 days   Lab Units 05/13/21  0720 05/12/21  0615 05/11/21  2247   POTASSIUM mmol/L 3 8 3 5 3 4*   CHLORIDE mmol/L 114* 115* 110*   CO2 mmol/L 24 26 30   BUN mg/dL 7 7 8   CREATININE mg/dL 0 63 0 68 0 64   EGFR ml/min/1 73sq m 122 119 122   CALCIUM mg/dL 8 4 8 0* 8 6   AST U/L 19  18 26  --    ALT U/L 35  34 36  --    ALK PHOS U/L 78  79 66  --      Results from last 7 days   Lab Units 05/13/21  0720 05/12/21  0615 05/11/21  2247   WBC Thousand/uL 4 52 6 47 4 99   HEMOGLOBIN g/dL 11 1* 10 5* 11 3*   PLATELETS Thousands/uL 151 200 212           Imaging Studies:   I have personally reviewed pertinent imaging study reports and images in PACS  EKG, Pathology, and Other Studies:   I have personally reviewed pertinent reports

## 2021-05-15 VITALS
HEART RATE: 65 BPM | SYSTOLIC BLOOD PRESSURE: 113 MMHG | RESPIRATION RATE: 20 BRPM | WEIGHT: 140 LBS | TEMPERATURE: 99.4 F | DIASTOLIC BLOOD PRESSURE: 73 MMHG | OXYGEN SATURATION: 97 % | HEIGHT: 65 IN | BODY MASS INDEX: 23.32 KG/M2

## 2021-05-15 PROCEDURE — 99239 HOSP IP/OBS DSCHRG MGMT >30: CPT | Performed by: INTERNAL MEDICINE

## 2021-05-15 RX ORDER — OXYCODONE HYDROCHLORIDE 5 MG/1
5 TABLET ORAL EVERY 6 HOURS PRN
Status: DISCONTINUED | OUTPATIENT
Start: 2021-05-15 | End: 2021-05-15 | Stop reason: HOSPADM

## 2021-05-15 RX ORDER — OXYCODONE HYDROCHLORIDE 10 MG/1
10 TABLET ORAL EVERY 6 HOURS PRN
Status: DISCONTINUED | OUTPATIENT
Start: 2021-05-15 | End: 2021-05-15 | Stop reason: HOSPADM

## 2021-05-15 RX ORDER — METHOCARBAMOL 750 MG/1
750 TABLET, FILM COATED ORAL EVERY 6 HOURS SCHEDULED
Qty: 28 TABLET | Refills: 0 | Status: SHIPPED | OUTPATIENT
Start: 2021-05-15 | End: 2021-05-22 | Stop reason: HOSPADM

## 2021-05-15 RX ORDER — OXYCODONE HYDROCHLORIDE 5 MG/1
5 TABLET ORAL EVERY 6 HOURS PRN
Qty: 5 TABLET | Refills: 0 | Status: ON HOLD | OUTPATIENT
Start: 2021-05-15 | End: 2021-05-22 | Stop reason: SDUPTHER

## 2021-05-15 RX ORDER — CLONAZEPAM 0.5 MG/1
0.5 TABLET ORAL 2 TIMES DAILY
Qty: 4 TABLET | Refills: 0 | Status: ON HOLD | OUTPATIENT
Start: 2021-05-15 | End: 2021-05-22

## 2021-05-15 RX ORDER — GABAPENTIN 300 MG/1
300 CAPSULE ORAL 2 TIMES DAILY
Qty: 20 CAPSULE | Refills: 0 | Status: ON HOLD | OUTPATIENT
Start: 2021-05-15 | End: 2021-05-22 | Stop reason: SDUPTHER

## 2021-05-15 RX ADMIN — APIXABAN 5 MG: 5 TABLET, FILM COATED ORAL at 07:39

## 2021-05-15 RX ADMIN — DOCUSATE SODIUM AND SENNOSIDES 1 TABLET: 8.6; 5 TABLET ORAL at 07:38

## 2021-05-15 RX ADMIN — NICOTINE 1 PATCH: 21 PATCH, EXTENDED RELEASE TRANSDERMAL at 07:39

## 2021-05-15 RX ADMIN — OXYCODONE HYDROCHLORIDE 10 MG: 10 TABLET ORAL at 06:29

## 2021-05-15 RX ADMIN — GABAPENTIN 300 MG: 300 CAPSULE ORAL at 07:39

## 2021-05-15 RX ADMIN — POLYETHYLENE GLYCOL 3350 17 G: 17 POWDER, FOR SOLUTION ORAL at 07:38

## 2021-05-15 RX ADMIN — METHOCARBAMOL TABLETS 750 MG: 750 TABLET, COATED ORAL at 06:29

## 2021-05-15 RX ADMIN — ARIPIPRAZOLE 10 MG: 10 TABLET ORAL at 07:40

## 2021-05-15 RX ADMIN — CLONAZEPAM 0.5 MG: 1 TABLET ORAL at 07:39

## 2021-05-15 RX ADMIN — HYDROMORPHONE HYDROCHLORIDE 1 MG: 1 INJECTION, SOLUTION INTRAMUSCULAR; INTRAVENOUS; SUBCUTANEOUS at 07:39

## 2021-05-15 RX ADMIN — ASPIRIN 81 MG: 81 TABLET, COATED ORAL at 07:38

## 2021-05-15 NOTE — PLAN OF CARE
Problem: Potential for Falls  Goal: Patient will remain free of falls  Description: INTERVENTIONS:  - Assess patient frequently for physical needs  -  Identify cognitive and physical deficits and behaviors that affect risk of falls    -  [place] fall precautions as indicated by assessment   - Educate patient/family on patient safety including physical limitations  - Instruct patient to call for assistance with activity based on assessment  - Modify environment to reduce risk of injury  - Consider OT/PT consult to assist with strengthening/mobility  5/15/2021 1540 by Sissy Payne RN  Outcome: Completed  5/15/2021 1539 by Sissy Payne RN  Outcome: Progressing     Problem: PAIN - ADULT  Goal: Verbalizes/displays adequate comfort level or baseline comfort level  Description: Interventions:  - Encourage patient to monitor pain and request assistance  - Assess pain using appropriate pain scale  - Administer analgesics based on type and severity of pain and evaluate response  - Implement non-pharmacological measures as appropriate and evaluate response  - Consider cultural and social influences on pain and pain management  - Notify physician/advanced practitioner if interventions unsuccessful or patient reports new pain  5/15/2021 1540 by Sissy Payne RN  Outcome: Completed  5/15/2021 1539 by Sissy Payne RN  Outcome: Progressing     Problem: INFECTION - ADULT  Goal: Absence or prevention of progression during hospitalization  Description: INTERVENTIONS:  - Assess and monitor for signs and symptoms of infection  - Monitor lab/diagnostic results  - Monitor all insertion sites, i e  indwelling lines, tubes, and drains  - Monitor endotracheal if appropriate and nasal secretions for changes in amount and color  - [place] appropriate cooling/warming therapies per order  - Administer medications as ordered  - Instruct and encourage patient and family to use good hand hygiene technique  - Identify and instruct in appropriate isolation precautions for identified infection/condition  5/15/2021 1540 by Rosita Brothers RN  Outcome: Completed  5/15/2021 1539 by Rosita Brothers RN  Outcome: Progressing     Problem: DISCHARGE PLANNING  Goal: Discharge to home or other facility with appropriate resources  Description: INTERVENTIONS:  - Identify barriers to discharge w/patient and caregiver  - Arrange for needed discharge resources and transportation as appropriate  - Identify discharge learning needs (meds, wound care, etc )  - Arrange for interpretive services to assist at discharge as needed  - Refer to Case Management Department for coordinating discharge planning if the patient needs post-hospital services based on physician/advanced practitioner order or complex needs related to functional status, cognitive ability, or social support system  5/15/2021 1540 by Rosita Brothers RN  Outcome: Completed  5/15/2021 1539 by Rosita Brothers RN  Outcome: Progressing     Problem: SKIN/TISSUE INTEGRITY - ADULT  Goal: Skin integrity remains intact  Description: INTERVENTIONS  - Identify patients at risk for skin breakdown  - Assess and monitor skin integrity  - Assess and monitor nutrition and hydration status  - Monitor labs (i e  albumin)  - Assess for incontinence   - Turn and reposition patient  - Assist with mobility/ambulation  - Relieve pressure over bony prominences  - Avoid friction and shearing  - Provide appropriate hygiene as needed including keeping skin clean and dry  - Evaluate need for skin moisturizer/barrier cream  - Collaborate with interdisciplinary team (i e  Nutrition, Rehabilitation, etc )   - Patient/family teaching  5/15/2021 1540 by Rosita Brothers RN  Outcome: Completed  5/15/2021 1539 by Rosita Brothers RN  Outcome: Progressing

## 2021-05-15 NOTE — ASSESSMENT & PLAN NOTE
· Patient has known history of substance use and odes inject in her neck  · Recently was in  [facility] for neck pain an d possible swelling concerning  for phlegmon / infection and was on iv abx but she left AMA from there,  · Imaging studies done in [facility] did not reveal foreign body at the neck  However there is soft tissue swelling raising the possibility of neck phlegmon  · Exam more likely correlating with ecchymosis and tenderness without any fluctuance  · CT at [facility] shows swelling and hemorrhage  · CT neck here shows retained needle with surrounding swelling and focal tiny hemorrhage  but no major hemorrhage or abscess  Findings along the left subclavian vein noted which are likely secondary to self drug injection  · Gallbladder wall edema noted    PLAN:  · Monitor off antibiotics per ID, appreciate input  · Discussed with surgery team regarding CT scan findings, no acute intervention needed  Appreciate input  · Currently on p r n  Oxycodone and Dilaudid for pain along with muscle relaxants and gabapentin but acute Pain Services, appreciate input  ·  consulted for placement  Pt would like to go home and she will arrange for her transport per her  · Patient did not wait for discharge paperwork to be given to her and left prior to that  · Low-dose Oxycodone for weekend given to the patient given significant pain and counseled extensively regarding risks of concomitant use of oxycodone and insulin as being an accidental overdose  She said she will reach out to her doctor on Monday

## 2021-05-15 NOTE — ASSESSMENT & PLAN NOTE
Right upper quadrant ultrasound negative for acute cholecystitis  Mild fluid around gallbladder noted which is likely related to hypoproteinemia for ultrasound results  Discussed with surgery, no surgical intervention needed

## 2021-05-15 NOTE — PLAN OF CARE
Problem: Potential for Falls  Goal: Patient will remain free of falls  Description: INTERVENTIONS:  - Assess patient frequently for physical needs  -  Identify cognitive and physical deficits and behaviors that affect risk of falls    -  [place] fall precautions as indicated by assessment   - Educate patient/family on patient safety including physical limitations  - Instruct patient to call for assistance with activity based on assessment  - Modify environment to reduce risk of injury  - Consider OT/PT consult to assist with strengthening/mobility  Outcome: Progressing     Problem: PAIN - ADULT  Goal: Verbalizes/displays adequate comfort level or baseline comfort level  Description: Interventions:  - Encourage patient to monitor pain and request assistance  - Assess pain using appropriate pain scale  - Administer analgesics based on type and severity of pain and evaluate response  - Implement non-pharmacological measures as appropriate and evaluate response  - Consider cultural and social influences on pain and pain management  - Notify physician/advanced practitioner if interventions unsuccessful or patient reports new pain  Outcome: Progressing     Problem: INFECTION - ADULT  Goal: Absence or prevention of progression during hospitalization  Description: INTERVENTIONS:  - Assess and monitor for signs and symptoms of infection  - Monitor lab/diagnostic results  - Monitor all insertion sites, i e  indwelling lines, tubes, and drains  - Monitor endotracheal if appropriate and nasal secretions for changes in amount and color  - [place] appropriate cooling/warming therapies per order  - Administer medications as ordered  - Instruct and encourage patient and family to use good hand hygiene technique  - Identify and instruct in appropriate isolation precautions for identified infection/condition  Outcome: Progressing     Problem: DISCHARGE PLANNING  Goal: Discharge to home or other facility with appropriate resources  Description: INTERVENTIONS:  - Identify barriers to discharge w/patient and caregiver  - Arrange for needed discharge resources and transportation as appropriate  - Identify discharge learning needs (meds, wound care, etc )  - Arrange for interpretive services to assist at discharge as needed  - Refer to Case Management Department for coordinating discharge planning if the patient needs post-hospital services based on physician/advanced practitioner order or complex needs related to functional status, cognitive ability, or social support system  Outcome: Progressing     Problem: SKIN/TISSUE INTEGRITY - ADULT  Goal: Skin integrity remains intact  Description: INTERVENTIONS  - Identify patients at risk for skin breakdown  - Assess and monitor skin integrity  - Assess and monitor nutrition and hydration status  - Monitor labs (i e  albumin)  - Assess for incontinence   - Turn and reposition patient  - Assist with mobility/ambulation  - Relieve pressure over bony prominences  - Avoid friction and shearing  - Provide appropriate hygiene as needed including keeping skin clean and dry  - Evaluate need for skin moisturizer/barrier cream  - Collaborate with interdisciplinary team (i e  Nutrition, Rehabilitation, etc )   - Patient/family teaching  Outcome: Progressing

## 2021-05-15 NOTE — DISCHARGE SUMMARY
[facility]  Discharge- [address] 1990, 31 y o  female MRN: [id number]  Unit/Bed#: [facility] 834-01 Encounter: [id number]  Primary Care Provider: Michelle Block PA-C   Date and time admitted to hospital: 5/11/2021  9:59 PM    * Ecchymosis of neck  Assessment & Plan  · Patient has known history of substance use and odes inject in her neck  · Recently was in  [facility] for neck pain an d possible swelling concerning  for phlegmon / infection and was on iv abx but she left AMA from there,  · Imaging studies done in [facility] did not reveal foreign body at the neck  However there is soft tissue swelling raising the possibility of neck phlegmon  · Exam more likely correlating with ecchymosis and tenderness without any fluctuance  · CT at [facility] shows swelling and hemorrhage  · CT neck here shows retained needle with surrounding swelling and focal tiny hemorrhage  but no major hemorrhage or abscess  Findings along the left subclavian vein noted which are likely secondary to self drug injection  · Gallbladder wall edema noted    PLAN:  · Monitor off antibiotics per ID, appreciate input  · Discussed with surgery team regarding CT scan findings, no acute intervention needed  Appreciate input  · Currently on p r n  Oxycodone and Dilaudid for pain along with muscle relaxants and gabapentin but acute Pain Services, appreciate input  ·  consulted for placement  Pt would like to go home and she will arrange for her transport per her  · Patient did not wait for discharge paperwork to be given to her and left prior to that  · Low-dose Oxycodone for weekend given to the patient given significant pain and counseled extensively regarding risks of concomitant use of oxycodone and insulin as being an accidental overdose  She said she will reach out to her doctor on Monday      Thickening of wall of gallbladder and right upper quadrant tenderness  Assessment & Plan  Right upper quadrant ultrasound negative for acute cholecystitis  Mild fluid around gallbladder noted which is likely related to hypoproteinemia for ultrasound results  Discussed with surgery, no surgical intervention needed  Retained foreign body of neck  Assessment & Plan  · Secondary to IV drug use  · Seen and evaluated initially in March 2021  Surgery evaluated and had no plan for removal at that time  · Given worsening neck pain, and what appears to be worsening inflammatory changes , surgery was reconsulted related not have any additional surgical recommendations to offer  Bradycardia  Assessment & Plan  · Noted to have bradycardia on routine nursing check  · 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   · At times does complain of fatigue and lightheadedness  · Cardiology consulted; appreciate input  · No indication for pacemaker at this time   · Clonidine discontinued    Chronic anticoagulation  Assessment & Plan  · Secondary to acute L arm DVT   · Continue Eliquis 5 mg twice daily     S/P TVR (tricuspid valve repair)  Assessment & Plan  Due to infective endocarditis  In 2020  Continues to report IVDU    Known history of DVT of axillary vein, acute left (HCC)  Assessment & Plan  Continue Eliquis at home dose    Intravenous drug abuse ([facility] [address] )  Assessment & Plan  · With known history of IVDA  · Case management consulted; patient refers to her boyfriend's home  Bipolar 1 disorder (HCC)  Assessment & Plan  · Continue Abilify 10 mg daily  · Klonopin 0 5 mg twice daily, checked PDMP  She wasprescribed and filled 28 tabs of klonopin on 4/30 for 14 day and she said she does not have klonopin anymore, I will send her with klonopin script for weekend and she will reach out to her physician on Monday about that,  · Seroquel 25 mg HS  · Supportive care     Insomnia  Assessment & Plan  · Restoril 15 mg daily at night          Discharge Summary - [facility]    Patient Information: Odalis Charles 31 y o  female MRN: [id number]  Unit/Bed#: [facility] 834-01 Encounter: [id number]    Discharging Physician / Practitioner: Rica Hutson MD  PCP: Erika Ahn PA-C  Admission Date: 5/11/2021  Discharge Date: 05/15/21    Reason for Admission: neck pain    Discharge Diagnoses:     Principal Problem:    Ecchymosis of neck  Active Problems:    Insomnia    Bipolar 1 disorder (HCC)    Intravenous drug abuse ([address] )    Known history of DVT of axillary vein, acute left (HCC)    S/P TVR (tricuspid valve repair)    Chronic anticoagulation    Bradycardia    Retained foreign body of neck    Thickening of wall of gallbladder and right upper quadrant tenderness  Resolved Problems:    * No resolved hospital problems  *      Consultations During Hospital Stay:  · ID  · Surgery  · Cardiology  · APS    Significant Findings / Test Results:     US right upper quadrant   Final Result by Nicolle Wolf DO (05/15 8113)      Mild gallbladder wall thickening and trace pericholecystic fluid without gallstones or ultrasonographic Llanos's sign  Findings are nonspecific with most likely consideration being hypoproteinemic state (patient has known low albumin level)  Additional    differential considerations include chronic acalculous cholecystitis (considered less likely given absence of Llanos's sign) as well as biliary dyskinesia  The latter 2 entities could be evaluated with nuclear medicine HIDA scan with CCK challenge if    clinically relevant  Otherwise, unremarkable study  Workstation performed: [id number]         CT soft tissue neck w contrast   Final Result by Korin Pace MD (05/13 4058)      Retained needle within the left supraclavicular fossa a trace amount of regional blood products and overlying skin thickening    Otherwise no large focal hematoma or abscess formation on the current exam       Multiple radiopaque densities noted adjacent to the left subclavian vein, within the subscapularis musculature and adjacent to the left proximal humerus  Findings are new since the prior CT and may in part be secondary to injected particulate matter  I personally discussed this study with Katherine Melgar on 5/13/2021 at 4:47 PM                Workstation performed: [id number]         CT chest w contrast   Final Result by Serafin Segura MD (05/13 1428)      Interval improvement in previously seen multifocal peripherally situated septic emboli  No new areas of focal airspace consolidation identified  Stable nodules noted in the left lung favored to be on the basis of residual scar with additional areas of linear scar noted in the right lung  Attention on follow-up examinations is recommended  Small amount of high density pericardial fluid/hemorrhage, as reported on recent CT  Findings are new since prior chest CT from 9/4/2020  Partially imaged gallbladder wall edema should be correlated with ultrasound  Correlate with separate CT neck report for the remainder the findings  I personally discussed this study with Katherine Melgar on 5/13/2021 at 4:40 PM                 Workstation performed: [id number]                 Hospital Course:     Milka Melgar is a 31 y o  female patient who originally presented to the hospital on 5/11/2021 due to neck pain  CT was obtained due to concerns for infection and phlegmon   Increase needle was seen on CT but no abscess or acute infection  Patient was evaluated by surgery and ID  No antibiotics are needed per ID  Surgery team he did not think any surgical intervention was recommended  Patient had some gallbladder wall edema on CT scan for which ultrasound was done which was negative for acute cholecystitis  Pain Management saw the patient given her history of substance abuse  Patient was seen by Cardiology given bradycardia and clonidine was discontinued    Patient did not wait for her discharge paperwork to be given to her prior to discharge  Please refer to detailed assessment and plan above for further details  Condition at Discharge: stable     Discharge Day Visit / Exam:     Subjective:  No overnight events  Reports some neck pain  She wants to go home today as she has important issues to take care of  Hold me that she was given only 14 days of Klonopin in April and then she ran out of Klonopin  Check PDMP and shows that 14 days of Klonopin was prescribed on 04/30  Vitals: Blood Pressure: 113/73 (05/15/21 0757)  Pulse: 65 (05/13/21 2243)  Temperature: 99 4 °F (37 4 °C) (05/15/21 0757)  Temp Source: Oral (05/12/21 2311)  Respirations: 20 (05/15/21 0757)  Height: 5' 5" (165 1 cm) (05/12/21 1217)  Weight - Scale: 63 5 kg (140 lb) (05/12/21 1217)  SpO2: 97 % (05/13/21 2243)  Exam:   Physical Exam  Constitutional:       General: She is not in acute distress  Eyes:      Pupils: Pupils are equal, round, and reactive to light  Neck:      Comments: Bilateral mild ecchymoses noted with minimal tenderness and no fluctuance  Cardiovascular:      Rate and Rhythm: Normal rate and regular rhythm       Heart sounds: Normal heart sounds  No murmur  Pulmonary:      Effort: No respiratory distress       Breath sounds: Normal breath sounds  No wheezing or rales  Abdominal:      General: Bowel sounds are normal  There is no distension       Palpations: Abdomen is soft       Tenderness:  Right upper quadrant tenderness noted   Musculoskeletal:         General: No swelling  Skin:     General: Skin is warm  Neurological:      Mental Status: She is alert       Comments: Awake alert and communicative     Discharge instructions/Information to patient and family:   See after visit summary for information provided to patient and family  Provisions for Follow-Up Care:  See after visit summary for information related to follow-up care and any pertinent home health orders        Disposition:     Home Discharge Statement:  I spent 40 minutes discharging the patient  This time was spent on the day of discharge  I had direct contact with the patient on the day of discharge  Greater than 50% of the total time was spent examining patient, answering all patient questions, arranging and discussing plan of care with patient as well as directly providing post-discharge instructions  Additional time then spent on discharge activities  Discharge Medications:  See after visit summary for reconciled discharge medications provided to patient and family        ** Please Note: This note has been constructed using a voice recognition system **

## 2021-05-15 NOTE — ASSESSMENT & PLAN NOTE
· Continue Abilify 10 mg daily  · Klonopin 0 5 mg twice daily, checked PDMP   She wasprescribed and filled 28 tabs of klonopin on 4/30 for 14 day and she said she does not have klonopin anymore, I will send her with klonopin script for weekend and she will reach out to her physician on Monday about that,  · Seroquel 25 mg HS  · Supportive care

## 2021-05-20 ENCOUNTER — APPOINTMENT (EMERGENCY)
Dept: CT IMAGING | Facility: HOSPITAL | Age: 31
DRG: 384 | End: 2021-05-20
Payer: COMMERCIAL

## 2021-05-20 ENCOUNTER — HOSPITAL ENCOUNTER (INPATIENT)
Facility: HOSPITAL | Age: 31
LOS: 2 days | Discharge: HOME | DRG: 384 | End: 2021-05-22
Attending: EMERGENCY MEDICINE | Admitting: HOSPITALIST
Payer: COMMERCIAL

## 2021-05-20 DIAGNOSIS — F31.9 BIPOLAR 1 DISORDER (HCC): ICD-10-CM

## 2021-05-20 DIAGNOSIS — L03.221 CELLULITIS OF NECK: Primary | ICD-10-CM

## 2021-05-20 DIAGNOSIS — M79.5 FOREIGN BODY (FB) IN SOFT TISSUE: ICD-10-CM

## 2021-05-20 DIAGNOSIS — J18.9 MULTIFOCAL PNEUMONIA: ICD-10-CM

## 2021-05-20 DIAGNOSIS — L02.91 PHLEGMONOUS CELLULITIS: ICD-10-CM

## 2021-05-20 DIAGNOSIS — M54.2 NECK PAIN: ICD-10-CM

## 2021-05-20 DIAGNOSIS — R22.1 NECK SWELLING: ICD-10-CM

## 2021-05-20 DIAGNOSIS — F19.10 POLYSUBSTANCE ABUSE (HCC): ICD-10-CM

## 2021-05-20 LAB
ABO GROUP BLD: NORMAL
ALBUMIN SERPL BCP-MCNC: 4.8 G/DL (ref 3.5–5)
ALP SERPL-CCNC: 102 U/L (ref 46–116)
ALT SERPL W P-5'-P-CCNC: 48 U/L (ref 12–78)
AMPHETAMINES SERPL QL SCN: NEGATIVE
ANION GAP SERPL CALCULATED.3IONS-SCNC: 9 MMOL/L (ref 4–13)
APTT PPP: 30 SECONDS (ref 23–37)
AST SERPL W P-5'-P-CCNC: 32 U/L (ref 5–45)
BARBITURATES UR QL: NEGATIVE
BASOPHILS # BLD AUTO: 0.02 THOUSANDS/ΜL (ref 0–0.1)
BASOPHILS NFR BLD AUTO: 0 % (ref 0–1)
BENZODIAZ UR QL: NEGATIVE
BILIRUB SERPL-MCNC: 0.48 MG/DL (ref 0.2–1)
BILIRUB UR QL STRIP: NEGATIVE
BLD GP AB SCN SERPL QL: NEGATIVE
BUN SERPL-MCNC: 14 MG/DL (ref 5–25)
CALCIUM SERPL-MCNC: 9.5 MG/DL (ref 8.3–10.1)
CHLORIDE SERPL-SCNC: 100 MMOL/L (ref 100–108)
CK SERPL-CCNC: 96 U/L (ref 26–192)
CLARITY UR: CLEAR
CO2 SERPL-SCNC: 28 MMOL/L (ref 21–32)
COCAINE UR QL: POSITIVE
COLOR UR: YELLOW
CREAT SERPL-MCNC: 0.73 MG/DL (ref 0.6–1.3)
CRP SERPL HS-MCNC: 7.27 MG/L
EOSINOPHIL # BLD AUTO: 0.33 THOUSAND/ΜL (ref 0–0.61)
EOSINOPHIL NFR BLD AUTO: 3 % (ref 0–6)
ERYTHROCYTE [DISTWIDTH] IN BLOOD BY AUTOMATED COUNT: 13 % (ref 11.6–15.1)
GFR SERPL CREATININE-BSD FRML MDRD: 112 ML/MIN/1.73SQ M
GLUCOSE SERPL-MCNC: 90 MG/DL (ref 65–140)
GLUCOSE UR STRIP-MCNC: NEGATIVE MG/DL
HCG SERPL QL: NEGATIVE
HCT VFR BLD AUTO: 40.7 % (ref 34.8–46.1)
HGB BLD-MCNC: 13.4 G/DL (ref 11.5–15.4)
HGB UR QL STRIP.AUTO: NEGATIVE
HIV 1+2 AB+HIV1 P24 AG SERPL QL IA: NORMAL
HIV1 P24 AG SER QL: NORMAL
HOLD SPECIMEN: NORMAL
HOLD SPECIMEN: NORMAL
IMM GRANULOCYTES # BLD AUTO: 0.03 THOUSAND/UL (ref 0–0.2)
IMM GRANULOCYTES NFR BLD AUTO: 0 % (ref 0–2)
INR PPP: 1.03 (ref 0.84–1.19)
KETONES UR STRIP-MCNC: NEGATIVE MG/DL
LACTATE SERPL-SCNC: 1 MMOL/L (ref 0.5–2)
LEUKOCYTE ESTERASE UR QL STRIP: NEGATIVE
LYMPHOCYTES # BLD AUTO: 2.26 THOUSANDS/ΜL (ref 0.6–4.47)
LYMPHOCYTES NFR BLD AUTO: 23 % (ref 14–44)
MCH RBC QN AUTO: 28.8 PG (ref 26.8–34.3)
MCHC RBC AUTO-ENTMCNC: 32.9 G/DL (ref 31.4–37.4)
MCV RBC AUTO: 88 FL (ref 82–98)
METHADONE UR QL: NEGATIVE
MONOCYTES # BLD AUTO: 0.5 THOUSAND/ΜL (ref 0.17–1.22)
MONOCYTES NFR BLD AUTO: 5 % (ref 4–12)
NEUTROPHILS # BLD AUTO: 6.68 THOUSANDS/ΜL (ref 1.85–7.62)
NEUTS SEG NFR BLD AUTO: 69 % (ref 43–75)
NITRITE UR QL STRIP: NEGATIVE
NRBC BLD AUTO-RTO: 0 /100 WBCS
OPIATES UR QL SCN: POSITIVE
OXYCODONE+OXYMORPHONE UR QL SCN: POSITIVE
PCP UR QL: NEGATIVE
PH UR STRIP.AUTO: 6 [PH]
PLATELET # BLD AUTO: 294 THOUSANDS/UL (ref 149–390)
PMV BLD AUTO: 8.5 FL (ref 8.9–12.7)
POTASSIUM SERPL-SCNC: 3.5 MMOL/L (ref 3.5–5.3)
PROT SERPL-MCNC: 9.3 G/DL (ref 6.4–8.2)
PROT UR STRIP-MCNC: NEGATIVE MG/DL
PROTHROMBIN TIME: 13.6 SECONDS (ref 11.6–14.5)
RBC # BLD AUTO: 4.65 MILLION/UL (ref 3.81–5.12)
RH BLD: NEGATIVE
SARS-COV-2 RNA RESP QL NAA+PROBE: NEGATIVE
SODIUM SERPL-SCNC: 137 MMOL/L (ref 136–145)
SP GR UR STRIP.AUTO: 1.02 (ref 1–1.03)
SPECIMEN EXPIRATION DATE: NORMAL
THC UR QL: POSITIVE
UROBILINOGEN UR QL STRIP.AUTO: 0.2 E.U./DL
WBC # BLD AUTO: 9.82 THOUSAND/UL (ref 4.31–10.16)

## 2021-05-20 PROCEDURE — 99285 EMERGENCY DEPT VISIT HI MDM: CPT

## 2021-05-20 PROCEDURE — 87591 N.GONORRHOEAE DNA AMP PROB: CPT | Performed by: INTERNAL MEDICINE

## 2021-05-20 PROCEDURE — 85025 COMPLETE CBC W/AUTO DIFF WBC: CPT | Performed by: EMERGENCY MEDICINE

## 2021-05-20 PROCEDURE — 86901 BLOOD TYPING SEROLOGIC RH(D): CPT | Performed by: EMERGENCY MEDICINE

## 2021-05-20 PROCEDURE — 96361 HYDRATE IV INFUSION ADD-ON: CPT

## 2021-05-20 PROCEDURE — 86900 BLOOD TYPING SEROLOGIC ABO: CPT | Performed by: EMERGENCY MEDICINE

## 2021-05-20 PROCEDURE — U0005 INFEC AGEN DETEC AMPLI PROBE: HCPCS | Performed by: EMERGENCY MEDICINE

## 2021-05-20 PROCEDURE — 85610 PROTHROMBIN TIME: CPT | Performed by: EMERGENCY MEDICINE

## 2021-05-20 PROCEDURE — 80307 DRUG TEST PRSMV CHEM ANLYZR: CPT | Performed by: EMERGENCY MEDICINE

## 2021-05-20 PROCEDURE — 99254 IP/OBS CNSLTJ NEW/EST MOD 60: CPT | Performed by: INTERNAL MEDICINE

## 2021-05-20 PROCEDURE — G1004 CDSM NDSC: HCPCS

## 2021-05-20 PROCEDURE — U0003 INFECTIOUS AGENT DETECTION BY NUCLEIC ACID (DNA OR RNA); SEVERE ACUTE RESPIRATORY SYNDROME CORONAVIRUS 2 (SARS-COV-2) (CORONAVIRUS DISEASE [COVID-19]), AMPLIFIED PROBE TECHNIQUE, MAKING USE OF HIGH THROUGHPUT TECHNOLOGIES AS DESCRIBED BY CMS-2020-01-R: HCPCS | Performed by: EMERGENCY MEDICINE

## 2021-05-20 PROCEDURE — 70491 CT SOFT TISSUE NECK W/DYE: CPT

## 2021-05-20 PROCEDURE — 94664 DEMO&/EVAL PT USE INHALER: CPT

## 2021-05-20 PROCEDURE — 86850 RBC ANTIBODY SCREEN: CPT | Performed by: EMERGENCY MEDICINE

## 2021-05-20 PROCEDURE — 99254 IP/OBS CNSLTJ NEW/EST MOD 60: CPT | Performed by: PSYCHIATRY & NEUROLOGY

## 2021-05-20 PROCEDURE — 80053 COMPREHEN METABOLIC PANEL: CPT | Performed by: EMERGENCY MEDICINE

## 2021-05-20 PROCEDURE — 81003 URINALYSIS AUTO W/O SCOPE: CPT | Performed by: INTERNAL MEDICINE

## 2021-05-20 PROCEDURE — 85730 THROMBOPLASTIN TIME PARTIAL: CPT | Performed by: EMERGENCY MEDICINE

## 2021-05-20 PROCEDURE — 83605 ASSAY OF LACTIC ACID: CPT | Performed by: EMERGENCY MEDICINE

## 2021-05-20 PROCEDURE — 71250 CT THORAX DX C-: CPT

## 2021-05-20 PROCEDURE — 96376 TX/PRO/DX INJ SAME DRUG ADON: CPT

## 2021-05-20 PROCEDURE — 86592 SYPHILIS TEST NON-TREP QUAL: CPT | Performed by: INTERNAL MEDICINE

## 2021-05-20 PROCEDURE — 87491 CHLMYD TRACH DNA AMP PROBE: CPT | Performed by: INTERNAL MEDICINE

## 2021-05-20 PROCEDURE — 96374 THER/PROPH/DIAG INJ IV PUSH: CPT

## 2021-05-20 PROCEDURE — 82550 ASSAY OF CK (CPK): CPT | Performed by: EMERGENCY MEDICINE

## 2021-05-20 PROCEDURE — 87040 BLOOD CULTURE FOR BACTERIA: CPT | Performed by: EMERGENCY MEDICINE

## 2021-05-20 PROCEDURE — 87806 HIV AG W/HIV1&2 ANTB W/OPTIC: CPT | Performed by: INTERNAL MEDICINE

## 2021-05-20 PROCEDURE — 86141 C-REACTIVE PROTEIN HS: CPT | Performed by: EMERGENCY MEDICINE

## 2021-05-20 PROCEDURE — 99222 1ST HOSP IP/OBS MODERATE 55: CPT | Performed by: HOSPITALIST

## 2021-05-20 PROCEDURE — 99285 EMERGENCY DEPT VISIT HI MDM: CPT | Performed by: EMERGENCY MEDICINE

## 2021-05-20 PROCEDURE — 84703 CHORIONIC GONADOTROPIN ASSAY: CPT | Performed by: EMERGENCY MEDICINE

## 2021-05-20 PROCEDURE — 36415 COLL VENOUS BLD VENIPUNCTURE: CPT | Performed by: EMERGENCY MEDICINE

## 2021-05-20 PROCEDURE — 87449 NOS EACH ORGANISM AG IA: CPT | Performed by: PHYSICIAN ASSISTANT

## 2021-05-20 PROCEDURE — 96375 TX/PRO/DX INJ NEW DRUG ADDON: CPT

## 2021-05-20 RX ORDER — VANCOMYCIN HYDROCHLORIDE 1 G/200ML
15 INJECTION, SOLUTION INTRAVENOUS EVERY 12 HOURS
Status: DISCONTINUED | OUTPATIENT
Start: 2021-05-20 | End: 2021-05-20

## 2021-05-20 RX ORDER — MORPHINE SULFATE 4 MG/ML
4 INJECTION, SOLUTION INTRAMUSCULAR; INTRAVENOUS ONCE
Status: COMPLETED | OUTPATIENT
Start: 2021-05-20 | End: 2021-05-20

## 2021-05-20 RX ORDER — IPRATROPIUM BROMIDE AND ALBUTEROL SULFATE 2.5; .5 MG/3ML; MG/3ML
3 SOLUTION RESPIRATORY (INHALATION) EVERY 6 HOURS PRN
Status: DISCONTINUED | OUTPATIENT
Start: 2021-05-20 | End: 2021-05-22 | Stop reason: HOSPADM

## 2021-05-20 RX ORDER — ONDANSETRON 2 MG/ML
4 INJECTION INTRAMUSCULAR; INTRAVENOUS ONCE
Status: COMPLETED | OUTPATIENT
Start: 2021-05-20 | End: 2021-05-20

## 2021-05-20 RX ORDER — ACETAMINOPHEN 325 MG/1
975 TABLET ORAL EVERY 6 HOURS SCHEDULED
Status: DISCONTINUED | OUTPATIENT
Start: 2021-05-20 | End: 2021-05-22 | Stop reason: HOSPADM

## 2021-05-20 RX ORDER — CLONAZEPAM 0.5 MG/1
0.5 TABLET ORAL 2 TIMES DAILY
Status: DISCONTINUED | OUTPATIENT
Start: 2021-05-20 | End: 2021-05-22 | Stop reason: HOSPADM

## 2021-05-20 RX ORDER — QUETIAPINE FUMARATE 100 MG/1
100 TABLET, FILM COATED ORAL
Status: DISCONTINUED | OUTPATIENT
Start: 2021-05-20 | End: 2021-05-21

## 2021-05-20 RX ORDER — OXYCODONE HYDROCHLORIDE 10 MG/1
10 TABLET ORAL EVERY 4 HOURS PRN
Status: DISCONTINUED | OUTPATIENT
Start: 2021-05-20 | End: 2021-05-22 | Stop reason: HOSPADM

## 2021-05-20 RX ORDER — KETOROLAC TROMETHAMINE 30 MG/ML
15 INJECTION, SOLUTION INTRAMUSCULAR; INTRAVENOUS EVERY 6 HOURS SCHEDULED
Status: DISPENSED | OUTPATIENT
Start: 2021-05-20 | End: 2021-05-22

## 2021-05-20 RX ORDER — HYDROMORPHONE HCL/PF 1 MG/ML
1 SYRINGE (ML) INJECTION EVERY 4 HOURS PRN
Status: DISCONTINUED | OUTPATIENT
Start: 2021-05-20 | End: 2021-05-22 | Stop reason: HOSPADM

## 2021-05-20 RX ORDER — NICOTINE 21 MG/24HR
1 PATCH, TRANSDERMAL 24 HOURS TRANSDERMAL DAILY
Status: DISCONTINUED | OUTPATIENT
Start: 2021-05-20 | End: 2021-05-22 | Stop reason: HOSPADM

## 2021-05-20 RX ORDER — SENNOSIDES 8.6 MG
1 TABLET ORAL DAILY
Status: DISCONTINUED | OUTPATIENT
Start: 2021-05-20 | End: 2021-05-22 | Stop reason: HOSPADM

## 2021-05-20 RX ORDER — OXYCODONE HYDROCHLORIDE 5 MG/1
5 TABLET ORAL EVERY 4 HOURS PRN
Status: DISCONTINUED | OUTPATIENT
Start: 2021-05-20 | End: 2021-05-22 | Stop reason: HOSPADM

## 2021-05-20 RX ORDER — SODIUM CHLORIDE 9 MG/ML
100 INJECTION, SOLUTION INTRAVENOUS CONTINUOUS
Status: DISCONTINUED | OUTPATIENT
Start: 2021-05-20 | End: 2021-05-22 | Stop reason: HOSPADM

## 2021-05-20 RX ORDER — CLONIDINE 0.1 MG/24H
0.1 PATCH, EXTENDED RELEASE TRANSDERMAL WEEKLY
Status: DISCONTINUED | OUTPATIENT
Start: 2021-05-20 | End: 2021-05-22 | Stop reason: HOSPADM

## 2021-05-20 RX ORDER — ARIPIPRAZOLE 5 MG/1
10 TABLET ORAL DAILY
Status: DISCONTINUED | OUTPATIENT
Start: 2021-05-20 | End: 2021-05-20

## 2021-05-20 RX ORDER — METHOCARBAMOL 750 MG/1
750 TABLET, FILM COATED ORAL EVERY 6 HOURS SCHEDULED
Status: DISCONTINUED | OUTPATIENT
Start: 2021-05-20 | End: 2021-05-22 | Stop reason: HOSPADM

## 2021-05-20 RX ORDER — SODIUM CHLORIDE 9 MG/ML
125 INJECTION, SOLUTION INTRAVENOUS CONTINUOUS
Status: DISCONTINUED | OUTPATIENT
Start: 2021-05-20 | End: 2021-05-20

## 2021-05-20 RX ORDER — GABAPENTIN 300 MG/1
300 CAPSULE ORAL 2 TIMES DAILY
Status: DISCONTINUED | OUTPATIENT
Start: 2021-05-20 | End: 2021-05-22 | Stop reason: HOSPADM

## 2021-05-20 RX ORDER — QUETIAPINE FUMARATE 25 MG/1
50 TABLET, FILM COATED ORAL
Status: DISCONTINUED | OUTPATIENT
Start: 2021-05-20 | End: 2021-05-20

## 2021-05-20 RX ADMIN — NICOTINE 1 PATCH: 14 PATCH, EXTENDED RELEASE TRANSDERMAL at 08:13

## 2021-05-20 RX ADMIN — CLONAZEPAM 0.5 MG: 0.5 TABLET ORAL at 17:45

## 2021-05-20 RX ADMIN — CLONAZEPAM 0.5 MG: 0.5 TABLET ORAL at 08:13

## 2021-05-20 RX ADMIN — APIXABAN 5 MG: 5 TABLET, FILM COATED ORAL at 17:45

## 2021-05-20 RX ADMIN — MORPHINE SULFATE 4 MG: 4 INJECTION INTRAVENOUS at 03:00

## 2021-05-20 RX ADMIN — DOXYCYCLINE 100 MG: 100 INJECTION, POWDER, LYOPHILIZED, FOR SOLUTION INTRAVENOUS at 06:29

## 2021-05-20 RX ADMIN — ACETAMINOPHEN 975 MG: 325 TABLET, FILM COATED ORAL at 23:25

## 2021-05-20 RX ADMIN — ONDANSETRON 4 MG: 2 INJECTION INTRAMUSCULAR; INTRAVENOUS at 03:00

## 2021-05-20 RX ADMIN — GABAPENTIN 300 MG: 300 CAPSULE ORAL at 17:45

## 2021-05-20 RX ADMIN — VANCOMYCIN HYDROCHLORIDE 1250 MG: 5 INJECTION, POWDER, LYOPHILIZED, FOR SOLUTION INTRAVENOUS at 19:57

## 2021-05-20 RX ADMIN — METHOCARBAMOL TABLETS 750 MG: 750 TABLET, COATED ORAL at 08:10

## 2021-05-20 RX ADMIN — METHOCARBAMOL TABLETS 750 MG: 750 TABLET, COATED ORAL at 17:45

## 2021-05-20 RX ADMIN — KETOROLAC TROMETHAMINE 15 MG: 30 INJECTION, SOLUTION INTRAMUSCULAR at 23:26

## 2021-05-20 RX ADMIN — SODIUM CHLORIDE 500 ML: 0.9 INJECTION, SOLUTION INTRAVENOUS at 02:56

## 2021-05-20 RX ADMIN — ARIPIPRAZOLE 10 MG: 10 TABLET ORAL at 09:24

## 2021-05-20 RX ADMIN — VANCOMYCIN HYDROCHLORIDE 1250 MG: 1 INJECTION, POWDER, LYOPHILIZED, FOR SOLUTION INTRAVENOUS at 06:39

## 2021-05-20 RX ADMIN — OXYCODONE HYDROCHLORIDE 10 MG: 10 TABLET ORAL at 23:26

## 2021-05-20 RX ADMIN — HYDROMORPHONE HYDROCHLORIDE 1 MG: 1 INJECTION, SOLUTION INTRAMUSCULAR; INTRAVENOUS; SUBCUTANEOUS at 15:15

## 2021-05-20 RX ADMIN — SODIUM CHLORIDE 125 ML/HR: 0.9 INJECTION, SOLUTION INTRAVENOUS at 06:16

## 2021-05-20 RX ADMIN — OXYCODONE HYDROCHLORIDE 10 MG: 10 TABLET ORAL at 18:53

## 2021-05-20 RX ADMIN — IOHEXOL 85 ML: 350 INJECTION, SOLUTION INTRAVENOUS at 04:15

## 2021-05-20 RX ADMIN — HYDROMORPHONE HYDROCHLORIDE 1 MG: 1 INJECTION, SOLUTION INTRAMUSCULAR; INTRAVENOUS; SUBCUTANEOUS at 09:20

## 2021-05-20 RX ADMIN — CEFEPIME HYDROCHLORIDE 1000 MG: 1 INJECTION, POWDER, FOR SOLUTION INTRAMUSCULAR; INTRAVENOUS at 06:27

## 2021-05-20 RX ADMIN — KETOROLAC TROMETHAMINE 15 MG: 30 INJECTION, SOLUTION INTRAMUSCULAR at 17:45

## 2021-05-20 RX ADMIN — CLONIDINE 0.1 MG: 0.1 PATCH TRANSDERMAL at 09:28

## 2021-05-20 RX ADMIN — APIXABAN 5 MG: 5 TABLET, FILM COATED ORAL at 09:23

## 2021-05-20 RX ADMIN — METHOCARBAMOL TABLETS 750 MG: 750 TABLET, COATED ORAL at 23:25

## 2021-05-20 RX ADMIN — GABAPENTIN 300 MG: 300 CAPSULE ORAL at 08:10

## 2021-05-20 RX ADMIN — QUETIAPINE FUMARATE 100 MG: 100 TABLET ORAL at 23:25

## 2021-05-20 RX ADMIN — MORPHINE SULFATE 4 MG: 4 INJECTION INTRAVENOUS at 05:04

## 2021-05-20 RX ADMIN — HYDROMORPHONE HYDROCHLORIDE 1 MG: 1 INJECTION, SOLUTION INTRAMUSCULAR; INTRAVENOUS; SUBCUTANEOUS at 19:58

## 2021-05-20 RX ADMIN — ACETAMINOPHEN 975 MG: 325 TABLET, FILM COATED ORAL at 17:45

## 2021-05-20 RX ADMIN — CEFTRIAXONE SODIUM 1000 MG: 10 INJECTION, POWDER, FOR SOLUTION INTRAVENOUS at 15:19

## 2021-05-20 NOTE — H&P
[address] Hollie Gaspar 1990, 31 y o  female MRN: [id number]  Unit/Bed#: ED 08 Encounter: [id number]  Primary Care Provider: No primary care provider on file  Date and time admitted to hospital: 5/20/2021  2:16 AM    * Neck swelling  Assessment & Plan  · Presentation: Patient presents with complaints of worsening neck swelling and pain for the past day  Patient reports recent IVDA at site on neck  · CT soft tissue neck: "Mild enlargement of the right sternocleidomastoid muscle with adjacent subcutaneous inflammatory stranding suspicious for an infectious or inflammatory process such as a myositis  There is no focal fluid collection to suggest an abscess  Reidentified retained needle within the subcutaneous soft tissues of left supraclavicular fossa  Previously described multiple radiopaque densities noted adjacent to the left subclavian vein are poorly visualized on this study secondary to venous contamination from IV contrast administration  Multifocal scattered nodular opacities at the visualized right upper lobe which are new when compared to the prior exam and suspicious for an infectious or inflammatory process in this patient with a previous history of septic emboli "  · CT chest: "Linear metallic foreign body in the left neck, similar to the prior studies, most compatible with a needle fragment  This appears similar to the most recent CT soft tissue neck on May 13, 2021 "  · Pain control; avoid narcotics  If patient is utilizing narcotics at every chance, please consider acute pain specialist consult    Pneumonia  Assessment & Plan  · CT chest: "Interval development of nodular groundglass infiltrates in the right upper lobe and right middle lobe "  Patient did not meet SIRS criteria upon admission  COVID-19 pending  Treating as HCAP  IV antibiotics: Patient received IV Cefepime and IV Vanco in ED  Respiratory protocol, nebs PRN    Obtain sputum culture and Gram stain, strep and Legionella antigens  Obtain procalcitonin  Monitor respiratory status  Polysubstance abuse ([facility] [address] )  Assessment & Plan  · UDS: Cocaine+, opiate+, THC+, oxycodone+  · Clonidine 0 2 mg BID  · Case management consult  Retained foreign body of neck  Assessment & Plan  · CT chest: "Linear metallic foreign body in the left neck, similar to the prior studies, most compatible with a needle fragment  This appears similar to the most recent CT soft tissue neck on May 13, 2021 "    Chronic anticoagulation  Assessment & Plan  · Continue Eliquis  Bipolar 1 disorder (HCC)  Assessment & Plan  · Continue Abilify, Klonopin and Seroquel  VTE Pharmacologic Prophylaxis: VTE Score: 4 Moderate Risk (Score 3-4) - Pharmacological DVT Prophylaxis Ordered: apixaban (Eliquis)  Code Status: Level 1 - full code  Discussion with family: Patient declined call to   Anticipated Length of Stay: Patient will be admitted on an inpatient basis with an anticipated length of stay of greater than 2 midnights secondary to IV antibiotics for pneumonia, pain management  Total Time for Visit, including Counseling / Coordination of Care: 45 minutes Greater than 50% of this total time spent on direct patient counseling and coordination of care  Chief Complaint: worsening neck pain x 1 day    History of Present Illness:  S[place] Charles is a 31 y o  female with a PMH of IV drug use, H/o endocarditis with tricuspid valve replacement in 2020, H/o neck DVT chronically anticoagulated with Eliquis, retained needle in left neck who presents with right sided neck pain & swelling worsening for 1 day  Patient admits that after injecting coke into her right neck, her chronic neck swelling increased up to her jaw with associated increase in pain, worse with movement  Patient did take Oxy (that was given to her on her discharge from [facility] last week)   Patient does have h/o blood clots in her neck, but has been compliant with her Eliquis  Patient admits to associated sore throat developing while she's been in the ED  Patient states she feels like her "breathing is weird" but denies wheezing, shortness of breath and choking  Patient denies odynophagia  Patient complaining that she has been extremely manic "for months" stating maybe she "needs to increase her klonopin" and wants to be connected with a therapist  Patient denies HI/SI  Patient denies shortness of breath, chest pain, lightheadedness, weakness, cough, n/v, fever, chills, sweats  Patient reports that the last time she did drugs before coming into the ED was injection to her right neck with coke  Patient denies using Heroin since she was prescribed suboxone back in November  Patient has not been compliant with [facility]  for opioid use  Patient admits she doesn't think of heroin anymore, the coke has totally replaced it  Patient is concerned that her UDS shows + for both opiate & Oxycodone  Patient vehemently denies using heroin lately  Admits she has been using since her valve replacement because she cannot handle her emotions since her boyfriend  of an overdose back in August     Review of Systems:  Review of Systems   Constitutional: Negative for chills and fever  HENT: Positive for congestion, sore throat and voice change  Negative for facial swelling and trouble swallowing  Eyes: Negative  Respiratory: Negative for shortness of breath and wheezing  Cardiovascular: Negative  Gastrointestinal: Negative  Genitourinary: Negative  Musculoskeletal: Positive for neck pain and neck stiffness  Skin: Negative  Neurological: Negative  Psychiatric/Behavioral: The patient is nervous/anxious and is hyperactive          Past Medical and Surgical History:   Past Medical History:   Diagnosis Date    Abnormal Pap smear of cervix     Anxiety     Depression     Endocarditis     2018    Hepatitis C     HPV (human papilloma virus) anogenital infection        Past Surgical History:   Procedure Laterality Date    IR PICC PLACEMENT DOUBLE LUMEN  8/26/2020    KNEE SURGERY Left     MOUTH SURGERY      OR REPLACE TRICUSPID W CP BYPASS N/A 9/10/2020    Procedure: REPAIR VALVE TRICUSPID with [employer]tronic 30mm 3D Contour  Annuloplasty Ring;  Surgeon: Ekaterina Torres MD;  Location: [facility];  Service: Cardiac Surgery    TOOTH EXTRACTION N/A 9/7/2020    Procedure: EXTRACTION TOOTH 28;  Surgeon: Miguel Sykes DMD;  Location: [facility];  Service: Maxillofacial       Meds/Allergies:  Prior to Admission medications    Medication Sig Start Date End Date Taking? Authorizing Provider   apixaban (ELIQUIS) 5 mg Take 2 tablets (10 mg total) by mouth 2 (two) times a day for 7 days, THEN 1 tablet (5 mg total) 2 (two) times a day for 23 days   3/20/21 5/20/21 Yes Clive Dodd PA-C   ARIPiprazole (ABILIFY) 10 mg tablet Take 10 mg by mouth daily Dosage unknown   Yes Historical Provider, MD   clonazePAM (KlonoPIN) 0 5 mg tablet Take 1 tablet (0 5 mg total) by mouth 2 (two) times a day for 2 days 5/15/21 5/20/21 Yes Versa Crigler, MD   gabapentin (NEURONTIN) 300 mg capsule Take 1 capsule (300 mg total) by mouth 2 (two) times a day for 10 days 5/15/21 5/25/21 Yes Versa Crigler, MD   methocarbamol (ROBAXIN) 750 mg tablet Take 1 tablet (750 mg total) by mouth every 6 (six) hours for 7 days 5/15/21 5/22/21 Yes Versa Crigler, MD   oxyCODONE (ROXICODONE) 5 mg immediate release tablet Take 1 tablet (5 mg total) by mouth every 6 (six) hours as needed for moderate pain or severe pain (moderate pain)Max Daily Amount: 20 mg 5/15/21  Yes Versa Crigler, MD   QUEtiapine (SEROquel) 25 mg tablet Take 1 tablet (25 mg total) by mouth daily at bedtime  Patient taking differently: Take 50 mg by mouth daily at bedtime  3/21/21   Clive Dodd PA-C   aspirin (ECOTRIN LOW STRENGTH) 81 mg EC tablet Take 1 tablet (81 mg total) by mouth daily  Patient not taking: Reported on 2021 3/22/21 5/20/21  Gala Dorantes PA-C   nicotine (NICODERM CQ) 14 mg/24hr TD 24 hr patch Place 1 patch on the skin daily 3/22/21 5/20/21  Gala Dorantes PA-C     I have reviewed home medications with patient personally  Allergies: Allergies   Allergen Reactions    Cat Hair Extract Itching    Dog Epithelium     Latex     Pollen Extract        Social History:  Marital Status: Single   Occupation: Noncontributory  Patient Pre-hospital Living Situation: Kicked out of her mother's house, sleeps in her car now  Patient Pre-hospital Level of Mobility: walks  Patient Pre-hospital Diet Restrictions: None  Substance Use History:   Social History     Substance and Sexual Activity   Alcohol Use Not Currently    Alcohol/week: 0 0 standard drinks    Frequency: Monthly or less    Binge frequency: Never     Social History     Tobacco Use   Smoking Status Current Every Day Smoker    Packs/day: 0 50    Types: Cigarettes    Last attempt to quit: 2016    Years since quittin 5   Smokeless Tobacco Never Used     Social History     Substance and Sexual Activity   Drug Use Yes    Types: Heroin, Marijuana, Benzodiazepines, Cocaine, Methamphetamines       Family History:  No family history on file  Physical Exam:     Vitals:   Blood Pressure: 131/82 (21 0430)  Pulse: 71 (21)  Temperature: 99 °F (37 2 °C) (21)  Temp Source: Oral (21)  Respirations: 17 (21)  Height: 5' 5" (165 1 cm) (21)  Weight - Scale: 62 6 kg (138 lb) (21)  SpO2: 99 % (210)    Physical Exam  Vitals signs reviewed  Constitutional:       General: She is in acute distress  Appearance: She is not diaphoretic  Comments: Lying in bed, tears streaming down her face, visibly anxious   HENT:      Head: Normocephalic and atraumatic        Nose: Nose normal       Mouth/Throat:      Mouth: Mucous membranes are moist       Comments: Patent airway  Eyes:      Extraocular Movements: Extraocular movements intact  Conjunctiva/sclera: Conjunctivae normal    Neck:      Comments: Active ROM decreased due to pain  , Needle fixed on left side of neck  Right neck has swelling from angle of the mandible to the base of the neck, non-mobile, very tender to palpation, no increased warmth  Overlying bruises in various stages of healing  Cardiovascular:      Rate and Rhythm: Normal rate and regular rhythm  Pulses: Normal pulses  Heart sounds: Murmur present  Pulmonary:      Effort: Pulmonary effort is normal       Breath sounds: Normal breath sounds  No wheezing, rhonchi or rales  Abdominal:      General: Bowel sounds are normal  There is no distension  Palpations: Abdomen is soft  Tenderness: There is no abdominal tenderness  Musculoskeletal: Normal range of motion  General: No swelling or tenderness  Skin:     General: Skin is warm and dry  Capillary Refill: Capillary refill takes less than 2 seconds  Coloration: Skin is not pale  Findings: Bruising present  No erythema  Neurological:      General: No focal deficit present  Mental Status: She is alert  Psychiatric:         Attention and Perception: She is inattentive  Mood and Affect: Affect is labile  Speech: Speech is rapid and pressured and tangential          Behavior: Behavior is hyperactive  Thought Content: Thought content does not include suicidal ideation  Thought content does not include homicidal plan  Judgment: Judgment is inappropriate            Additional Data:     Lab Results:  Results from last 7 days   Lab Units 05/20/21  0256   WBC Thousand/uL 9 82   HEMOGLOBIN g/dL 13 4   HEMATOCRIT % 40 7   PLATELETS Thousands/uL 294   NEUTROS PCT % 69   LYMPHS PCT % 23   MONOS PCT % 5   EOS PCT % 3     Results from last 7 days   Lab Units 05/20/21  0256   SODIUM mmol/L 137   POTASSIUM mmol/L 3 5   CHLORIDE mmol/L 100   CO2 mmol/L 28   BUN mg/dL 14   CREATININE mg/dL 0 73   ANION GAP mmol/L 9   CALCIUM mg/dL 9 5   ALBUMIN g/dL 4 8   TOTAL BILIRUBIN mg/dL 0 48   ALK PHOS U/L 102   ALT U/L 48   AST U/L 32   GLUCOSE RANDOM mg/dL 90     Results from last 7 days   Lab Units 05/20/21  0256   INR  1 03             Results from last 7 days   Lab Units 05/20/21  0256   LACTIC ACID mmol/L 1 0       Imaging: Reviewed radiology reports from this admission including: chest CT scan & CT neck  CT chest without contrast   ED Interpretation by Kristal Angulo MD (05/20 3300)   COMPARISON:    CT neck performed earlier in the morning May 20, 2021 as well as studies of a CT soft tissue neck May 13, 2021 and chest radiographs December 26, 2020        TECHNIQUE: CT examination of the chest was performed without intravenous contrast   Axial, sagittal, and coronal 2D reformatted images were created from the source data and submitted for interpretation      Radiation dose length product (DLP) for this visit:  167 mGy-cm   This examination, like all CT scans performed in the [facility], was performed utilizing techniques to minimize radiation dose exposure, including the use of iterative   reconstruction and automated exposure control                FINDINGS:  LUNGS:    The lungs are well aerated        There has been the interval development of nodular groundglass infiltrates in the right upper and middle lobes      Linear scarring/atelectasis in the lingula         PLEURA:  Unremarkable            HEART/GREAT VESSELS:  Prior median sternotomy    V   alve replacement         MEDIASTINUM AND VLADIMIR:  Prominent lymph nodes in the anterior mediastinum, paratracheal region and subcarinal region         CHEST WALL AND LOWER NECK:   Bilateral nipple piercings      Enlarged lymph nodes in the axillary regions bilaterally, left side greater than right      There is a linear metallic foreign body in the left neck (series 2, image 3), similar to the prior studies            VISUALIZED STRUCTURES IN THE UPPER ABDOMEN:    The spleen is enlarged         OSSEOUS STRUCTURES:  No acute fracture or destructive osseous lesion               IMPRESSION:  1  Linear metallic foreign body in the left neck, similar to the prior studies, most compatible with a needle fragment  This appears similar to the most recent CT soft tissue neck on May 13, 2021      2  Interval development of nodular groundglass infiltrates in the right upper lobe and right middle lobe  Findings compatible with multifocal/multi lobar pneumonia  In the setting of clinically suspect   ed/proven COVID-19, the above lung parenchymal   findings on CT indicate intermediate confidence level for COVID-19            The study was marked in [facility] for immediate notification      Workstation performed: [id number]      Final Result by Kala Peterson DO (05/20 4162)   1  Linear metallic foreign body in the left neck, similar to the prior studies, most compatible with a needle fragment  This appears similar to the most recent CT soft tissue neck on May 13, 2021       2   Interval development of nodular groundglass infiltrates in the right upper lobe and right middle lobe  Findings compatible with multifocal/multi lobar pneumonia  In the setting of clinically suspected/proven COVID-19, the above lung parenchymal    findings on CT indicate intermediate confidence level for COVID-19  The study was marked in [facility] for immediate notification        Workstation performed: [id number]         CT soft tissue neck w contrast   ED Interpretation by Earnestine Miller MD (05/20 0946)   FINDINGS:     VISUALIZED BRAIN PARENCHYMA:  No acute intracranial pathology of the visualized brain parenchyma      VISUALIZED ORBITS AND PARANASAL SINUSES:  Normal      NASAL CAVITY AND NASOPHARYNX:  Normal      SUPRAHYOID NECK:  Normal oral cavity, tongue base, tonsillar fossa and epiglottis      INFRAHYOID NECK: Aryepiglottic folds and piriform sinuses are normal   Normal glottis and subglottic airway      THYROID GLAND:  Unremarkable      PAROTID AND SUBMANDIBULAR GLANDS:  Normal      LYMPH NODES:  There multiple scattered bilateral nonenlarged bilateral cervical lymph nodes      VASCULAR STRUCTURES:  Normal enhancement of the cervical vasculature      THORACIC INLET:  Again noted is a left-sided radiopaque retained needle within the left supraclavicular fossa measuring approximately 7 mm in length (series 2, image 56, 57)  There is no adjacent focal fluid collection to suggest an abscess      Previously described multiple tiny metallic radiodensities in the region of the left a   xilla adjacent to the left subclavian vein are difficult to visualize on today's exam secondary to contrast noted within the left subclavian vein as the patient was   injected in the left arm      Postoperative changes of prior median sternotomy are present      There a few nodular airspace opacities noted at the visualized right upper lobe suspicious for an infectious or inflammatory process  Mild soft tissue prominence at the anterior mediastinum is stable from the prior exam      BONY STRUCTURES: No acute fracture or destructive osseous lesion      There is mild enlargement of the right sternocleidomastoid muscle when compared to the left with mild inflammatory stranding tracking superficial to the right sternocleidomastoid muscle      IMPRESSION:     Mild enlargement of the right sternocleidomastoid muscle with adjacent subcutaneous inflammatory stranding suspicious for an infectious or inflammatory process such as a myositis  There is no focal fluid collection to suggest an abs   cess      Reidentified retained needle within the subcutaneous soft tissues of left supraclavicular fossa    Previously described multiple radiopaque densities noted adjacent to the left subclavian vein are poorly visualized on this study secondary to venous contamination from IV contrast administration      Multifocal scattered nodular opacities at the visualized right upper lobe which are new when compared to the prior exam and suspicious for an infectious or inflammatory process in this patient with a previous history of septic emboli  Consider a   dedicated CT chest for further evaluation      Workstation performed: [id number]      Final Result by Melissa Fenton MD (05/20 0448)      Mild enlargement of the right sternocleidomastoid muscle with adjacent subcutaneous inflammatory stranding suspicious for an infectious or inflammatory process such as a myositis  There is no focal fluid collection to suggest an abscess  Reidentified retained needle within the subcutaneous soft tissues of left supraclavicular fossa  Previously described multiple radiopaque densities noted adjacent to the left subclavian vein are poorly visualized on this study secondary to venous    contamination from IV contrast administration  Multifocal scattered nodular opacities at the visualized right upper lobe which are new when compared to the prior exam and suspicious for an infectious or inflammatory process in this patient with a previous history of septic emboli  Consider a    dedicated CT chest for further evaluation  Workstation performed: [id number]             EKG and Other Studies Reviewed on Admission:   · No EKG obtained in ED     ** Please Note: This note has been constructed using a voice recognition system   **

## 2021-05-20 NOTE — ASSESSMENT & PLAN NOTE
· CT chest: "Linear metallic foreign body in the left neck, similar to the prior studies, most compatible with a needle fragment    This appears similar to the most recent CT soft tissue neck on May 13, 2021 "

## 2021-05-20 NOTE — ED NOTES
Patient ambulatory to bathroom to provide urine sample       [employer], RN  05/20/21 1101

## 2021-05-20 NOTE — ASSESSMENT & PLAN NOTE
· Presentation: Patient presents with complaints of worsening neck swelling and pain for the past day  Patient reports recent IVDA at site on neck  · CT soft tissue neck: "Mild enlargement of the right sternocleidomastoid muscle with adjacent subcutaneous inflammatory stranding suspicious for an infectious or inflammatory process such as a myositis  There is no focal fluid collection to suggest an abscess  Reidentified retained needle within the subcutaneous soft tissues of left supraclavicular fossa  Previously described multiple radiopaque densities noted adjacent to the left subclavian vein are poorly visualized on this study secondary to venous contamination from IV contrast administration  Multifocal scattered nodular opacities at the visualized right upper lobe which are new when compared to the prior exam and suspicious for an infectious or inflammatory process in this patient with a previous history of septic emboli "  · CT chest: "Linear metallic foreign body in the left neck, similar to the prior studies, most compatible with a needle fragment  This appears similar to the most recent CT soft tissue neck on May 13, 2021 "  · Pain control; avoid narcotics   If patient is utilizing narcotics at every chance, please consider acute pain specialist consult

## 2021-05-20 NOTE — CONSULTS
Consultation - Infectious Disease   Jane Giang 31 y o  female MRN: [id number]  Unit/Bed#: ED 08 Encounter: [id number]      IMPRESSION & RECOMMENDATIONS:     Plan is not official will need to be discussed with ID attending    Neck Swelling  Patient has neck swelling from recent IV drug abuse, concerns for infectious etiology    5/20 CT of the neck showed Mild enlargement of the right sternocleidomastoid muscle with adjacent subcutaneous inflammatory stranding suspicious for an infectious or inflammatory process such as a myositis  Shaista Marine is no focal fluid collection to suggest an abscess  Reidentified retained needle within the subcutaneous soft tissues of left supraclavicular fossa  · Likely developing cellulitis patient is currently on cefepime 2 g every 8 hours    Multifocal pneumonia  CT chest: nodular groundglass infiltrates in the right upper lobe and right middle lobe  COVID negative  · Currently on cefepime 2 g every 8 hours  · Currently on vancomycin at 20 milligram/kilogram every 12 hours with pharmacy consult  · Awaiting sputum culture and Gram stain  · Awaiting strep pneumo /Legionella urine  · Will likely order procalcitonin  · Blood culture in progress      Suspected UTI  Was on antibiotics Keflex for 2 days  · Patient currently on cefepime which should cover this  · Consider urinalysis and culture      Hepatitis-C chronically  Still with active injection drug use   · Patient reported she is willing to quit now  · Liver enzymes stable  · Will need GI follow-up as an outpatient for treatment of hepatitis-C      Possible STD exposure/however patient denies this and that the story was made up  Per record had unprotected sexual exposure  Gonorrhea are p r n   Chlamydia however was negative    · According the patient she did not take  post exposure prophylaxis with ISENTRESS and Truvada that was supposed to be for  28 days  · Patient also did not follow-up with lab work and HIV screening for 6 weeks, 3 months and 6 months  · Consider HIV test in the hospital       Left clavicular foreign body/retained needle fragment from IVDU     · Per previous records surgery had no plans for any intervention     History of MSSA bacteremia and TV endocarditis, status post TV repair    Patient completed 6 weeks course of IV antibiotic     · Patient does not have any murmur  · Pending blood culture results  · Denies any fever   · 2021 echo no vegetation seen     Active IVDU  · Patient is willing to quit         HISTORY OF PRESENT ILLNESS:  Reason for Consult:  Multifocal pneumonia  HPI: Edmar Rosales is a 31y o  year old female with significant past medical history of IVDU (currently using cocaine but is willing to quit), hepatitis C not treated, neck DVT on Eliquis, retained needle in left neck, anxiety/depression (partner just  recently 2020, prior history of TV endocarditis with right septic sacroiliitis with  prolonged hospitalization 2020 with MSSA bacteremia secondary to IVDU and TV endocarditis with right septic sacroiliitis  (TV repair with annuloplasty 2020)   She completed 6 weeks of IV antibiotic as an outpatient   After discharge, she was placed on p o  Idetamy Robles, noncompliant with treatment and follow-up (per records ID team tried to contact multiple times)  Patient uses vape daily, smokes around 1 pack a day, occasional use of marijuana once a week, uses cocaine, does not drink alcohol      Patient was following infectious disease doctor in [facility] for chronic hepatitis C was supposed to have fibrous scan done and treatment but no follow-up was done    Patient was seen on [facility] on 03/10 to 3/11 for acute metabolic encephalopathy/left neck pain, when patient became conscious went against medical advice    Patient was seen in ER on 3/11 and asking to be admitted again to the hospital , however before the patient is admitted to the floor patient decided to leave against medical advice  Patient was on [facility] on 03/13 -3/16 for cellulitis of the right neck however left against medical advice    3/17-3/2121 was admitted on [facility] for cellulitis right forearm, Keflex and doxycycline through 3/27 and  bacterial vaginosis was prescribed Flagyl b i d  for 7 days, high risk exposure to STD after sexual intercourse, was supposed to be on  Isentress and Truvada for PREP for 28 days and have  HIV check at 6 weeks, 3 month, and  6 months  Follow up [facility], GC/Chlamydia  negative  Patient however was noncompliant with this medication because she reported that at that time she was confused and was making up the story of high risk exposure  Per records patient was recently on [facility] for night pain for possible swelling concerning of infection/phlegmon was on IV antibiotics but left AMA  Patient was recently admitted in [facility] from 05/11-5/15/2015 for ecchymosis of the neck, CT neck at that time showed retained needle on the left neck, was seen by ID and recommended off antibiotics  Surgery was consulted no intervention needed  Patient reported coming to the hospital due to worsening right neck pain that has been going on for weeks with increased swelling that has been more worse than usual that she noticed a day ago, she reported that she has high pain tolerance however she reported she already took oxycodone that was prescribed to her legally but did not help, so her friend gave her an additional dose of to 30 mg to a total of 70 mg that day and it did not help  She reports that her last use of Suboxone was weeks ago  Per record, she did admit that  that she injected cocaine into her right neck  The pain is described as radiating to her neck, associated symptoms of nasal congestion and generalized weakness    She also describe her breathing as weird but denies any shortness of breath or choking sensation or difficulty swallowing  Patient denies any fever, chest pain, chills, nausea vomiting ordered dizziness  In the ER,  blood cultures was taken pending results, CT soft tissue of the neck was done showed mild enlargement of the right sternocleidomastoid muscle with sub cutaneous inflammation concern for infectious/inflammatory process such as myositis, needle still in the subcutaneous soft tissue of the left, there was concern for multifocal scattered nodular opacities on the right upper lobe, CT chest was done and showed nodular ground-glass infiltrates on the right upper lobe and right middle lobe compatible with multifocal multi lobe or pneumonia, was placed on cefepime , vancomycin and was given 1 dose of doxycycline  COVID test was done and was negative  Patient also reported that she has been having burning with urination for the past week after sex develops recurrent UTIs was prescribed Keflex has been taking it for 2 days  REVIEW OF SYSTEMS:  A complete 12 point system-based review of systems is negative other than that noted in the HPI      PAST MEDICAL HISTORY:  Past Medical History:   Diagnosis Date    Abnormal Pap smear of cervix     Anxiety     Depression     Endocarditis     2018    Hepatitis C     HPV (human papilloma virus) anogenital infection      Past Surgical History:   Procedure Laterality Date    IR PICC PLACEMENT DOUBLE LUMEN  8/26/2020    KNEE SURGERY Left     MOUTH SURGERY      MT REPLACE TRICUSPID W CP BYPASS N/A 9/10/2020    Procedure: REPAIR VALVE TRICUSPID with Medtronic 30mm 3D Contour  Annuloplasty Ring;  Surgeon: Latanya Esqueda MD;  Location: [facility];  Service: Cardiac Surgery    TOOTH EXTRACTION N/A 9/7/2020    Procedure: EXTRACTION TOOTH 32;  Surgeon: Jonathan Fajardo DMD;  Location: [facility];  Service: Maxillofacial       FAMILY HISTORY:  Non-contributory    SOCIAL HISTORY:  Social History   Social History     Substance and Sexual Activity   Alcohol Use Not Currently    Alcohol/week: 0 0 standard drinks    Frequency: Monthly or less    Binge frequency: Never     Social History     Substance and Sexual Activity   Drug Use Yes    Types: Heroin, Marijuana, Benzodiazepines, Cocaine, Methamphetamines     Social History     Tobacco Use   Smoking Status Current Every Day Smoker    Packs/day: 0 50    Types: Cigarettes    Last attempt to quit: 2016    Years since quittin 5   Smokeless Tobacco Never Used       ALLERGIES:  Allergies   Allergen Reactions    Cat Hair Extract Itching    Dog Epithelium     Latex     Pollen Extract        MEDICATIONS:  All current active medications have been reviewed  PHYSICAL EXAM:  Temp:  [99 °F (37 2 °C)] 99 °F (37 2 °C)  HR:  [66-80] 66  Resp:  [16-18] 16  BP: (131-147)/(58-82) 135/58  SpO2:  [96 %-100 %] 96 %  Temp (24hrs), Av °F (37 2 °C), Min:99 °F (37 2 °C), Max:99 °F (37 2 °C)  Current: Temperature: 99 °F (37 2 °C)    Intake/Output Summary (Last 24 hours) at 2021 1001  Last data filed at 2021 0816  Gross per 24 hour   Intake 768 33 ml   Output --   Net 768 33 ml     Physical Exam  Vitals signs reviewed  HENT:      Head: Normocephalic  Right Ear: Tympanic membrane normal       Left Ear: Tympanic membrane normal       Nose: Nose normal       Mouth/Throat:      Mouth: Mucous membranes are moist    Eyes:      Extraocular Movements: Extraocular movements intact  Conjunctiva/sclera: Conjunctivae normal       Pupils: Pupils are equal, round, and reactive to light  Neck:      Musculoskeletal: Normal range of motion  Muscular tenderness (Right side of the neck) present  Comments: Bruising on the right neck  Cardiovascular:      Rate and Rhythm: Normal rate and regular rhythm  Pulses: Normal pulses  Pulmonary:      Effort: Pulmonary effort is normal       Breath sounds: Normal breath sounds  Abdominal:      General: Abdomen is flat   Bowel sounds are normal       Palpations: Abdomen is soft  Musculoskeletal: Normal range of motion  Right lower leg: No edema  Left lower leg: No edema  Lymphadenopathy:      Cervical: No cervical adenopathy  Skin:     General: Skin is warm and dry  Capillary Refill: Capillary refill takes less than 2 seconds  Neurological:      General: No focal deficit present  Mental Status: She is alert and oriented to person, place, and time  Mental status is at baseline  Psychiatric:      Comments: Rapid speech         LABS, IMAGING, & OTHER STUDIES:  Lab Results:  I have personally reviewed pertinent labs  Results from last 7 days   Lab Units 05/20/21  0256   WBC Thousand/uL 9 82   HEMOGLOBIN g/dL 13 4   PLATELETS Thousands/uL 294     Results from last 7 days   Lab Units 05/20/21  0256   POTASSIUM mmol/L 3 5   CHLORIDE mmol/L 100   CO2 mmol/L 28   BUN mg/dL 14   CREATININE mg/dL 0 73   EGFR ml/min/1 73sq m 112   CALCIUM mg/dL 9 5   AST U/L 32   ALT U/L 48   ALK PHOS U/L 102     Results from last 7 days   Lab Units 05/20/21  0256 05/20/21  0242   BLOOD CULTURE  Received in Microbiology Lab  Culture in Progress  Received in Microbiology Lab  Culture in Progress  Imaging Studies:   I have personally reviewed pertinent imaging study reports and images in PACS  Other Studies:   I have personally reviewed pertinent reports

## 2021-05-20 NOTE — ASSESSMENT & PLAN NOTE
· CT chest: "Interval development of nodular groundglass infiltrates in the right upper lobe and right middle lobe "  Patient did not meet SIRS criteria upon admission  COVID-19 pending  Treating as HCAP  IV antibiotics: Patient received IV Cefepime and IV Vanco in ED  Respiratory protocol, nebs PRN  Obtain sputum culture and Gram stain, strep and Legionella antigens  Obtain procalcitonin  Monitor respiratory status

## 2021-05-20 NOTE — PROGRESS NOTES
Vancomycin Assessment    Ayla Samson is a 31 y o  female who is currently receiving vancomycin 1000 mg IV q12h for Pneumonia     Relevant clinical data and objective history reviewed:  Creatinine   Date Value Ref Range Status   05/20/2021 0 73 0 60 - 1 30 mg/dL Final     Comment:     Standardized to IDMS reference method   05/13/2021 0 63 0 60 - 1 30 mg/dL Final     Comment:     Standardized to IDMS reference method   05/12/2021 0 68 0 60 - 1 30 mg/dL Final     Comment:     Standardized to IDMS reference method     /82 (BP Location: Right arm)   Pulse 71   Temp 99 °F (37 2 °C) (Oral)   Resp 17   Ht 5' 5" (1 651 m)   Wt 62 6 kg (138 lb)   SpO2 99%   BMI 22 96 kg/m²   I/O last 3 completed shifts: In: 518 3 [IV Piggyback:518 3]  Out: -   Lab Results   Component Value Date/Time    BUN 14 05/20/2021 02:56 AM    WBC 9 82 05/20/2021 02:56 AM    WBC 8 4 07/26/2016 12:00 AM    HGB 13 4 05/20/2021 02:56 AM    HGB 13 3 03/17/2017 10:19 AM    HCT 40 7 05/20/2021 02:56 AM    HCT 36 1 10/27/2016 11:35 AM    MCV 88 05/20/2021 02:56 AM    MCV 90 07/26/2016 12:00 AM     05/20/2021 02:56 AM     07/26/2016 12:00 AM     Temp Readings from Last 3 Encounters:   05/20/21 99 °F (37 2 °C) (Oral)   05/15/21 99 4 °F (37 4 °C)   03/20/21 98 4 °F (36 9 °C)     Vancomycin Days of Therapy: 1    Assessment/Plan  The patient is currently on vancomycin utilizing scheduled dosing based on actual body weight  Baseline risks associated with therapy include: dehydration  The patient is currently ordered 1000 mg IV q12h and after clinical evaluation will be changed to 1250 mg IV q12h patient already got load dose of 1250 mg in ED   Pharmacy will also follow closely for s/sx of nephrotoxicity, infusion reactions, and appropriateness of therapy  BMP and CBC will be ordered per protocol  Plan for trough as patient approaches steady state, prior to the 4th  dose at approximately 1800 on 05/21/26    Due to infection severity, will target a trough of 15-20 (appropriate for most indications)   Pharmacy will continue to follow the patients culture results and clinical progress daily      Brodie Hung, Pharmacist

## 2021-05-20 NOTE — ED PROVIDER NOTES
History  Chief Complaint   Patient presents with    Neck Swelling     Patient reports known hx blood clots in neck - admitted to [facility] [place] but left AMA  Reports taking anticoags as prescribed, but concerned d/t increase to size of neck mass  Additionally reports relapse on drugs - using cocaine daily  Patient is a 31year old female with increased neck swelling since yesterday and recently used IVDA there  No fever  Denies trauma  No N/V  Last Tdap was in 2018 as per Epic  Was last seen at [facility] ED on 5/11/21 for ecchymosis of neck  [facility] website checked on this patient and last Rx filled were on 5/15/21 for oxycodone for 5 day supply and clonazepam for 2 day supply  LMP - a few days ago  Patient is on eliquis  Patient had COVID 19 infection in late November last year  (+) dysgeusia for past month  History provided by:  Patient   used: No        Prior to Admission Medications   Prescriptions Last Dose Informant Patient Reported? Taking? ARIPiprazole (ABILIFY) 10 mg tablet   Yes Yes   Sig: Take 10 mg by mouth daily Dosage unknown   QUEtiapine (SEROquel) 25 mg tablet   No Yes   Sig: Take 1 tablet (25 mg total) by mouth daily at bedtime   apixaban (ELIQUIS) 5 mg   No Yes   Sig: Take 2 tablets (10 mg total) by mouth 2 (two) times a day for 7 days, THEN 1 tablet (5 mg total) 2 (two) times a day for 23 days     clonazePAM (KlonoPIN) 0 5 mg tablet   No Yes   Sig: Take 1 tablet (0 5 mg total) by mouth 2 (two) times a day for 2 days   gabapentin (NEURONTIN) 300 mg capsule   No Yes   Sig: Take 1 capsule (300 mg total) by mouth 2 (two) times a day for 10 days   methocarbamol (ROBAXIN) 750 mg tablet   No Yes   Sig: Take 1 tablet (750 mg total) by mouth every 6 (six) hours for 7 days   oxyCODONE (ROXICODONE) 5 mg immediate release tablet   No Yes   Sig: Take 1 tablet (5 mg total) by mouth every 6 (six) hours as needed for moderate pain or severe pain (moderate pain)Max Daily Amount: 20 mg Facility-Administered Medications: None       Past Medical History:   Diagnosis Date    Abnormal Pap smear of cervix     Anxiety     Depression     Endocarditis     2018    Hepatitis C     HPV (human papilloma virus) anogenital infection        Past Surgical History:   Procedure Laterality Date    IR PICC PLACEMENT DOUBLE LUMEN  2020    KNEE SURGERY Left     MOUTH SURGERY      AR REPLACE TRICUSPID W CP BYPASS N/A 9/10/2020    Procedure: REPAIR VALVE TRICUSPID with Medtronic 30mm 3D Contour  Annuloplasty Ring;  Surgeon: Jono Arguello MD;  [facility]: [facility];  Service: Cardiac Surgery    TOOTH EXTRACTION N/A 2020    Procedure: EXTRACTION TOOTH 32;  Surgeon: Fredo Arias DMD;  Location: [facility];  Service: Maxillofacial       No family history on file  I have reviewed and agree with the history as documented  E-Cigarette/Vaping    E-Cigarette Use Current Every Day User      E-Cigarette/Vaping Substances    Nicotine Yes      Social History     Tobacco Use    Smoking status: Current Every Day Smoker     Packs/day: 0 50     Types: Cigarettes     Last attempt to quit: 2016     Years since quittin 5    Smokeless tobacco: Never Used   Substance Use Topics    Alcohol use: Not Currently     Alcohol/week: 0 0 standard drinks     Frequency: Monthly or less     Binge frequency: Never    Drug use: Yes     Types: Heroin, Marijuana, Benzodiazepines, Cocaine, Methamphetamines       Review of Systems   Constitutional: Negative for fever  Respiratory: Negative for shortness of breath  Gastrointestinal: Negative for nausea and vomiting  Musculoskeletal: Positive for neck pain  Neck swelling   All other systems reviewed and are negative  Physical Exam  Physical Exam  Vitals signs and nursing note reviewed  Constitutional:       General: She is in acute distress (moderate)  HENT:      Head: Normocephalic and atraumatic        Right Ear: External ear normal  Left Ear: External ear normal       Mouth/Throat:      Mouth: Mucous membranes are moist    Eyes:      General: No scleral icterus  Neck:      Musculoskeletal: Normal range of motion and neck supple  Muscular tenderness (R anterolateral with swelling) present  Cardiovascular:      Rate and Rhythm: Normal rate and regular rhythm  Heart sounds: Normal heart sounds  No murmur  Pulmonary:      Effort: Pulmonary effort is normal  No respiratory distress  Breath sounds: Normal breath sounds  No stridor  No wheezing, rhonchi or rales  Abdominal:      General: Bowel sounds are normal       Palpations: Abdomen is soft  Tenderness: There is no abdominal tenderness  Musculoskeletal:         General: No deformity  Right lower leg: No edema  Left lower leg: No edema  Skin:     General: Skin is warm and dry  Findings: No erythema or rash  Neurological:      General: No focal deficit present  Mental Status: She is alert and oriented to person, place, and time     Psychiatric:         Mood and Affect: Mood normal          Vital Signs  ED Triage Vitals [05/20/21 0219]   Temperature Pulse Respirations Blood Pressure SpO2   99 °F (37 2 °C) 80 18 147/80 100 %      Temp Source Heart Rate Source Patient Position - Orthostatic VS BP Location FiO2 (%)   Oral Monitor Sitting Right arm --      Pain Score       8           Vitals:    05/20/21 0219 05/20/21 0430   BP: 147/80 131/82   Pulse: 80 71   Patient Position - Orthostatic VS: Sitting Sitting         Visual Acuity      ED Medications  Medications   sodium chloride 0 9 % infusion (125 mL/hr Intravenous New Bag 5/20/21 0616)   cefepime (MAXIPIME) 1,000 mg in dextrose 5 % 50 mL IVPB (has no administration in time range)   vancomycin (VANCOCIN) 1,250 mg in sodium chloride 0 9 % 250 mL IVPB (has no administration in time range)   doxycycline (VIBRAMYCIN) 100 mg in sodium chloride 0 9 % 100 mL IVPB (has no administration in time range)   sodium chloride 0 9 % bolus 500 mL (0 mL Intravenous Stopped 5/20/21 0406)   morphine (PF) 4 mg/mL injection 4 mg (4 mg Intravenous Given 5/20/21 0300)   ondansetron (ZOFRAN) injection 4 mg (4 mg Intravenous Given 5/20/21 0300)   iohexol (OMNIPAQUE) 350 MG/ML injection (SINGLE-DOSE) 85 mL (85 mL Intravenous Given 5/20/21 0415)   morphine (PF) 4 mg/mL injection 4 mg (4 mg Intravenous Given 5/20/21 0504)       Diagnostic Studies  Results Reviewed     Procedure Component Value Units Date/Time    Novel Coronavirus (Covid-19),PCR [facility] - 2 Hour Stat [[id number]] Collected: 05/20/21 0614    Lab Status: No result Specimen: Nares from Nose     Trauma tubes on hold [[id number]] Collected: 05/20/21 0304    Lab Status: In process Specimen: Blood from Arm, Left Updated: 05/20/21 0501    Narrative: The following orders were created for panel order Trauma tubes on hold  Procedure                               Abnormality         Status                     ---------                               -----------         ------                     Francee Banker Top on AKKL[[id number]]                           Final result               Gold top on OOME[[id number]]                                 Final result               Green / Yellow tube on hold[[id number]]                      Final result               Green / Black tube on hold[[id number]]                       Final result               Lavender Top 7ml on XEHY[[id number]]                         In process                   Please view results for these tests on the individual orders      Rapid drug screen, urine [[id number]]  (Abnormal) Collected: 05/20/21 0346    Lab Status: Final result Specimen: Urine, Clean Catch Updated: 05/20/21 0403     Amph/Meth UR Negative     Barbiturate Ur Negative     Benzodiazepine Urine Negative     Cocaine Urine Positive     Methadone Urine Negative     Opiate Urine Positive     PCP Ur Negative     THC Urine Positive     Oxycodone Urine Positive Narrative:      Presumptive report  If requested, specimen will be sent to reference lab for confirmation  FOR MEDICAL PURPOSES ONLY  IF CONFIRMATION NEEDED PLEASE CONTACT THE LAB WITHIN 5 DAYS  Drug Screen Cutoff Levels:  AMPHETAMINE/METHAMPHETAMINES  1000 ng/mL  BARBITURATES     200 ng/mL  BENZODIAZEPINES     200 ng/mL  COCAINE      300 ng/mL  METHADONE      300 ng/mL  OPIATES      300 ng/mL  PHENCYCLIDINE     25 ng/mL  THC       50 ng/mL  OXYCODONE      100 ng/mL    hCG, qualitative pregnancy [[id number]]  (Normal) Collected: 05/20/21 0256    Lab Status: Final result Specimen: Blood from Arm, Left Updated: 05/20/21 0344     Preg, Serum Negative    CK Total with Reflex CKMB [[id number]]  (Normal) Collected: 05/20/21 0256    Lab Status: Final result Specimen: Blood from Arm, Left Updated: 05/20/21 0342     Total CK 96 U/L     High sensitivity CRP [[id number]] Collected: 05/20/21 0256    Lab Status: Final result Specimen: Blood from Arm, Left Updated: 05/20/21 0342     CRP, High Sensitivity 7 27 mg/L     Narrative:            HsCRP Level       Relative Risk           <1 0 mg/L          Low           1 0 to 3 0 mg/L    Average           >3 0 mg/L          High        Lactic acid [[id number]]  (Normal) Collected: 05/20/21 0256    Lab Status: Final result Specimen: Blood from Arm, Left Updated: 05/20/21 0332     LACTIC ACID 1 0 mmol/L     Narrative:      Result may be elevated if tourniquet was used during collection      Comprehensive metabolic panel [[id number]]  (Abnormal) Collected: 05/20/21 0256    Lab Status: Final result Specimen: Blood from Arm, Left Updated: 05/20/21 0331     Sodium 137 mmol/L      Potassium 3 5 mmol/L      Chloride 100 mmol/L      CO2 28 mmol/L      ANION GAP 9 mmol/L      BUN 14 mg/dL      Creatinine 0 73 mg/dL      Glucose 90 mg/dL      Calcium 9 5 mg/dL      AST 32 U/L      ALT 48 U/L      Alkaline Phosphatase 102 U/L      Total Protein 9 3 g/dL      Albumin 4 8 g/dL      Total Bilirubin 0 48 mg/dL      eGFR 112 ml/min/1 73sq m     Narrative:      [place] guidelines for Chronic Kidney Disease (CKD):     Stage 1 with normal or high GFR (GFR > 90 mL/min/1 73 square meters)    Stage 2 Mild CKD (GFR = 60-89 mL/min/1 73 square meters)    Stage 3A Moderate CKD (GFR = 45-59 mL/min/1 73 square meters)    Stage 3B Moderate CKD (GFR = 30-44 mL/min/1 73 square meters)    Stage 4 Severe CKD (GFR = 15-29 mL/min/1 73 square meters)    Stage 5 End Stage CKD (GFR <15 mL/min/1 73 square meters)  Note: GFR calculation is accurate only with a steady state creatinine    Protime-INR [[id number]]  (Normal) Collected: 05/20/21 0256    Lab Status: Final result Specimen: Blood from Arm, Left Updated: 05/20/21 0323     Protime 13 6 seconds      INR 1 03    APTT [[id number]]  (Normal) Collected: 05/20/21 0256    Lab Status: Final result Specimen: Blood from Arm, Left Updated: 05/20/21 0323     PTT 30 seconds     CBC and differential [[id number]]  (Abnormal) Collected: 05/20/21 0256    Lab Status: Final result Specimen: Blood from Arm, Left Updated: 05/20/21 0314     WBC 9 82 Thousand/uL      RBC 4 65 Million/uL      Hemoglobin 13 4 g/dL      Hematocrit 40 7 %      MCV 88 fL      MCH 28 8 pg      MCHC 32 9 g/dL      RDW 13 0 %      MPV 8 5 fL      Platelets 304 Thousands/uL      nRBC 0 /100 WBCs      Neutrophils Relative 69 %      Immat GRANS % 0 %      Lymphocytes Relative 23 %      Monocytes Relative 5 %      Eosinophils Relative 3 %      Basophils Relative 0 %      Neutrophils Absolute 6 68 Thousands/µL      Immature Grans Absolute 0 03 Thousand/uL      Lymphocytes Absolute 2 26 Thousands/µL      Monocytes Absolute 0 50 Thousand/µL      Eosinophils Absolute 0 33 Thousand/µL      Basophils Absolute 0 02 Thousands/µL     Blood culture #2 [[id number]] Collected: 05/20/21 0256    Lab Status:  In process Specimen: Blood from Arm, Left Updated: 05/20/21 0308    Blood culture #1 [[id number]] Collected: 05/20/21 0242    Lab Status: In process Specimen: Blood from Arm, Left Updated: 05/20/21 0244                 CT chest without contrast   ED Interpretation by Blade Bolden MD (05/20 2338)   COMPARISON:    CT neck performed earlier in the morning May 20, 2021 as well as studies of a CT soft tissue neck May 13, 2021 and chest radiographs December 26, 2020        TECHNIQUE: CT examination of the chest was performed without intravenous contrast   Axial, sagittal, and coronal 2D reformatted images were created from the source data and submitted for interpretation      Radiation dose length product (DLP) for this visit:  167 mGy-cm   This examination, like all CT scans performed in the [place], was performed utilizing techniques to minimize radiation dose exposure, including the use of iterative   reconstruction and automated exposure control                FINDINGS:  LUNGS:    The lungs are well aerated        There has been the interval development of nodular groundglass infiltrates in the right upper and middle lobes      Linear scarring/atelectasis in the lingula         PLEURA:  Unremarkable            HEART/GREAT VESSELS:  Prior median sternotomy  V   alve replacement         MEDIASTINUM AND VLADIMIR:  Prominent lymph nodes in the anterior mediastinum, paratracheal region and subcarinal region         CHEST WALL AND LOWER NECK:   Bilateral nipple piercings      Enlarged lymph nodes in the axillary regions bilaterally, left side greater than right      There is a linear metallic foreign body in the left neck (series 2, image 3), similar to the prior studies            VISUALIZED STRUCTURES IN THE UPPER ABDOMEN:    The spleen is enlarged         OSSEOUS STRUCTURES:  No acute fracture or destructive osseous lesion               IMPRESSION:  1  Linear metallic foreign body in the left neck, similar to the prior studies, most compatible with a needle fragment    This appears similar to the most recent CT soft tissue neck on May 13, 2021      2  Interval development of nodular groundglass infiltrates in the right upper lobe and right middle lobe  Findings compatible with multifocal/multi lobar pneumonia  In the setting of clinically suspect   ed/proven COVID-19, the above lung parenchymal   findings on CT indicate intermediate confidence level for COVID-19            The study was marked in [place] [address] for immediate notification      Workstation performed: [id number]      Final Result by Colleen Gregg DO (05/20 5309)   1  Linear metallic foreign body in the left neck, similar to the prior studies, most compatible with a needle fragment  This appears similar to the most recent CT soft tissue neck on May 13, 2021       2   Interval development of nodular groundglass infiltrates in the right upper lobe and right middle lobe  Findings compatible with multifocal/multi lobar pneumonia  In the setting of clinically suspected/proven COVID-19, the above lung parenchymal    findings on CT indicate intermediate confidence level for COVID-19  The study was marked in [place] [address] for immediate notification        Workstation performed: [id number]         CT soft tissue neck w contrast   ED Interpretation by Allyson Austin MD (05/20 6792)   FINDINGS:     VISUALIZED BRAIN PARENCHYMA:  No acute intracranial pathology of the visualized brain parenchyma      VISUALIZED ORBITS AND PARANASAL SINUSES:  Normal      NASAL CAVITY AND NASOPHARYNX:  Normal      SUPRAHYOID NECK:  Normal oral cavity, tongue base, tonsillar fossa and epiglottis      INFRAHYOID NECK:  Aryepiglottic folds and piriform sinuses are normal   Normal glottis and subglottic airway      THYROID GLAND:  Unremarkable      PAROTID AND SUBMANDIBULAR GLANDS:  Normal      LYMPH NODES:  There multiple scattered bilateral nonenlarged bilateral cervical lymph nodes      VASCULAR STRUCTURES:  Normal enhancement of the cervical vasculature      THORACIC INLET:  Again noted is a left-sided radiopaque retained needle within the left supraclavicular fossa measuring approximately 7 mm in length (series 2, image 56, 57)  There is no adjacent focal fluid collection to suggest an abscess      Previously described multiple tiny metallic radiodensities in the region of the left a   xilla adjacent to the left subclavian vein are difficult to visualize on today's exam secondary to contrast noted within the left subclavian vein as the patient was   injected in the left arm      Postoperative changes of prior median sternotomy are present      There a few nodular airspace opacities noted at the visualized right upper lobe suspicious for an infectious or inflammatory process  Mild soft tissue prominence at the anterior mediastinum is stable from the prior exam      BONY STRUCTURES: No acute fracture or destructive osseous lesion      There is mild enlargement of the right sternocleidomastoid muscle when compared to the left with mild inflammatory stranding tracking superficial to the right sternocleidomastoid muscle      IMPRESSION:     Mild enlargement of the right sternocleidomastoid muscle with adjacent subcutaneous inflammatory stranding suspicious for an infectious or inflammatory process such as a myositis  There is no focal fluid collection to suggest an abs   cess      Reidentified retained needle within the subcutaneous soft tissues of left supraclavicular fossa  Previously described multiple radiopaque densities noted adjacent to the left subclavian vein are poorly visualized on this study secondary to venous   contamination from IV contrast administration      Multifocal scattered nodular opacities at the visualized right upper lobe which are new when compared to the prior exam and suspicious for an infectious or inflammatory process in this patient with a previous history of septic emboli    Consider a   dedicated CT chest for further evaluation      Workstation performed: [id number]      Final Result by Anibal Gonzalez MD (05/20 0442)      Mild enlargement of the right sternocleidomastoid muscle with adjacent subcutaneous inflammatory stranding suspicious for an infectious or inflammatory process such as a myositis  There is no focal fluid collection to suggest an abscess  Reidentified retained needle within the subcutaneous soft tissues of left supraclavicular fossa  Previously described multiple radiopaque densities noted adjacent to the left subclavian vein are poorly visualized on this study secondary to venous    contamination from IV contrast administration  Multifocal scattered nodular opacities at the visualized right upper lobe which are new when compared to the prior exam and suspicious for an infectious or inflammatory process in this patient with a previous history of septic emboli  Consider a    dedicated CT chest for further evaluation  Workstation performed: [id number]                    Procedures  Procedures         ED Course  ED Course as of May 20 0616   Thu May 20, 2021   9612 Labs d/w patient  5419 CT neck d/w patient  CT chest ordered  Patient with worsening pain so more IV morphine ordered  0559 CT chest d/w patient  IV abx ordered  COVID testing ordered  Initial Sepsis Screening     Row Name 05/20/21 0324                Is the patient's history suggestive of a new or worsening infection? (!) Yes (Proceed)  -AO        Suspected source of infection  soft tissue  -AO        Are two or more of the following signs & symptoms of infection both present and new to the patient?   No  -AO        Indicate SIRS criteria  --        If the answer is yes to both questions, suspicion of sepsis is present  --        If severe sepsis is present AND tissue hypoperfusion perists in the hour after fluid resuscitation or lactate > 4, the patient meets criteria for SEPTIC SHOCK  --        Are any of the following organ dysfunction criteria present within 6 hours of suspected infection and SIRS criteria that are NOT considered to be chronic conditions? --        Organ dysfunction  --        Date of presentation of severe sepsis  --        Time of presentation of severe sepsis  --        Tissue hypoperfusion persists in the hour after crystalloid fluid administration, evidenced, by either:  --        Was hypotension present within one hour of the conclusion of crystalloid fluid administration?  --        Date of presentation of septic shock  --        Time of presentation of septic shock  --          User Key  (r) = Recorded By, (t) = Taken By, (c) = Cosigned By    [address] Name Provider Leyla Yoo MD Physician          SBIRT 20yo+      Most Recent Value   SBIRT (25 yo +)   In order to provide better care to our patients, we are screening all of our patients for alcohol and drug use  Would it be okay to ask you these screening questions? Unable to answer at this time Filed at: 05/20/2021 0220                    MDM  Number of Diagnoses or Management Options  Diagnosis management comments: DDx including but not limited to: lymphadenopathy, lymphoma, lymphadenitis, tumor, lipoma, SVC syndrome, abscess, thyroid etiology, cellulitis, hematoma, foreign body          Amount and/or Complexity of Data Reviewed  Clinical lab tests: ordered and reviewed  Tests in the radiology section of CPT®: ordered and reviewed  Decide to obtain previous medical records or to obtain history from someone other than the patient: yes  Review and summarize past medical records: yes  Independent visualization of images, tracings, or specimens: yes        Disposition  Final diagnoses:   Neck swelling   Neck pain   Foreign body (FB) in soft tissue   Multifocal pneumonia   Polysubstance abuse ([facility] [address] )     Time reflects when diagnosis was documented in both MDM as applicable and the Disposition within this note     Time User Action Codes Description Comment    5/20/2021  4:53 AM Launie Strong Add [R22 1] Neck swelling     5/20/2021  4:53 AM Launie Strong Add [M54 2] Neck pain     5/20/2021  4:54 AM Launie Strong Add [M79 5] Foreign body (FB) in soft tissue     5/20/2021  6:00 AM Launie Strong Add [J18 9] Multifocal pneumonia     5/20/2021  6:00 AM Launie Strong Modify [R22 1] Neck swelling     5/20/2021  6:00 AM Launie Strong Modify [J18 9] Multifocal pneumonia     5/20/2021  6:01 AM Launie Strong Add [F19 10] Polysubstance abuse [facility])       ED Disposition     ED Disposition Condition Date/Time Comment    Admit Stable Thu May 20, 2021  6:16 AM Case was discussed with CATHY Dainelson and the patient's admission status was agreed to be Admission Status: inpatient status to the service of Dr Martine Celaya   Follow-up Information    None         Patient's Medications   Discharge Prescriptions    No medications on file     No discharge procedures on file      PDMP Review       Value Time User    PDMP Reviewed  Yes 5/20/2021  2:19 AM Duglas Singh MD          ED Provider  Electronically Signed by           Duglas Singh MD  05/20/21 7455
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
## 2021-05-21 PROBLEM — R93.89 ABNORMAL CT OF THE CHEST: Status: ACTIVE | Noted: 2021-05-20

## 2021-05-21 LAB
L PNEUMO1 AG UR QL IA.RAPID: NEGATIVE
RPR SER QL: NORMAL
S PNEUM AG UR QL: NEGATIVE

## 2021-05-21 PROCEDURE — 99232 SBSQ HOSP IP/OBS MODERATE 35: CPT | Performed by: INTERNAL MEDICINE

## 2021-05-21 PROCEDURE — 99232 SBSQ HOSP IP/OBS MODERATE 35: CPT | Performed by: PSYCHIATRY & NEUROLOGY

## 2021-05-21 PROCEDURE — 99232 SBSQ HOSP IP/OBS MODERATE 35: CPT | Performed by: HOSPITALIST

## 2021-05-21 RX ORDER — AZITHROMYCIN 250 MG/1
1000 TABLET, FILM COATED ORAL ONCE
Status: COMPLETED | OUTPATIENT
Start: 2021-05-22 | End: 2021-05-22

## 2021-05-21 RX ORDER — ARIPIPRAZOLE 5 MG/1
5 TABLET ORAL DAILY
Status: DISCONTINUED | OUTPATIENT
Start: 2021-05-21 | End: 2021-05-21

## 2021-05-21 RX ORDER — ARIPIPRAZOLE 5 MG/1
10 TABLET ORAL DAILY
Status: DISCONTINUED | OUTPATIENT
Start: 2021-05-22 | End: 2021-05-22 | Stop reason: HOSPADM

## 2021-05-21 RX ORDER — LANOLIN ALCOHOL/MO/W.PET/CERES
3 CREAM (GRAM) TOPICAL
Status: DISCONTINUED | OUTPATIENT
Start: 2021-05-21 | End: 2021-05-22 | Stop reason: HOSPADM

## 2021-05-21 RX ORDER — PRAZOSIN HYDROCHLORIDE 1 MG/1
1 CAPSULE ORAL
Status: DISCONTINUED | OUTPATIENT
Start: 2021-05-21 | End: 2021-05-22 | Stop reason: HOSPADM

## 2021-05-21 RX ADMIN — HYDROMORPHONE HYDROCHLORIDE 1 MG: 1 INJECTION, SOLUTION INTRAMUSCULAR; INTRAVENOUS; SUBCUTANEOUS at 06:24

## 2021-05-21 RX ADMIN — HYDROMORPHONE HYDROCHLORIDE 1 MG: 1 INJECTION, SOLUTION INTRAMUSCULAR; INTRAVENOUS; SUBCUTANEOUS at 11:02

## 2021-05-21 RX ADMIN — GABAPENTIN 300 MG: 300 CAPSULE ORAL at 09:35

## 2021-05-21 RX ADMIN — HYDROMORPHONE HYDROCHLORIDE 1 MG: 1 INJECTION, SOLUTION INTRAMUSCULAR; INTRAVENOUS; SUBCUTANEOUS at 00:19

## 2021-05-21 RX ADMIN — GABAPENTIN 300 MG: 300 CAPSULE ORAL at 17:49

## 2021-05-21 RX ADMIN — ACETAMINOPHEN 975 MG: 325 TABLET, FILM COATED ORAL at 06:16

## 2021-05-21 RX ADMIN — KETOROLAC TROMETHAMINE 15 MG: 30 INJECTION, SOLUTION INTRAMUSCULAR at 06:16

## 2021-05-21 RX ADMIN — APIXABAN 5 MG: 5 TABLET, FILM COATED ORAL at 17:48

## 2021-05-21 RX ADMIN — CLONAZEPAM 0.5 MG: 0.5 TABLET ORAL at 09:35

## 2021-05-21 RX ADMIN — METHOCARBAMOL TABLETS 750 MG: 750 TABLET, COATED ORAL at 06:15

## 2021-05-21 RX ADMIN — VANCOMYCIN HYDROCHLORIDE 1250 MG: 5 INJECTION, POWDER, LYOPHILIZED, FOR SOLUTION INTRAVENOUS at 17:49

## 2021-05-21 RX ADMIN — APIXABAN 5 MG: 5 TABLET, FILM COATED ORAL at 09:35

## 2021-05-21 RX ADMIN — NICOTINE 1 PATCH: 14 PATCH, EXTENDED RELEASE TRANSDERMAL at 09:35

## 2021-05-21 RX ADMIN — CLONAZEPAM 0.5 MG: 0.5 TABLET ORAL at 17:48

## 2021-05-21 RX ADMIN — HYDROMORPHONE HYDROCHLORIDE 1 MG: 1 INJECTION, SOLUTION INTRAMUSCULAR; INTRAVENOUS; SUBCUTANEOUS at 20:13

## 2021-05-21 RX ADMIN — VANCOMYCIN HYDROCHLORIDE 1250 MG: 5 INJECTION, POWDER, LYOPHILIZED, FOR SOLUTION INTRAVENOUS at 06:16

## 2021-05-21 RX ADMIN — SENNOSIDES 8.6 MG: 8.6 TABLET ORAL at 09:35

## 2021-05-21 RX ADMIN — HYDROMORPHONE HYDROCHLORIDE 1 MG: 1 INJECTION, SOLUTION INTRAMUSCULAR; INTRAVENOUS; SUBCUTANEOUS at 16:18

## 2021-05-21 RX ADMIN — CEFTRIAXONE SODIUM 1000 MG: 10 INJECTION, POWDER, FOR SOLUTION INTRAVENOUS at 12:11

## 2021-05-21 RX ADMIN — OXYCODONE HYDROCHLORIDE 10 MG: 10 TABLET ORAL at 17:57

## 2021-05-21 NOTE — UTILIZATION REVIEW
Initial Clinical Review    Admission: Date/Time/Statement:   Admission Orders (From admission, onward)     Ordered        05/20/21 0616  Inpatient Admission  Once                   Orders Placed This Encounter   Procedures    Inpatient Admission     Standing Status:   Standing     Number of Occurrences:   1     Order Specific Question:   Level of Care     Answer:   Med Surg [16]     Order Specific Question:   Estimated length of stay     Answer:   More than 2 Midnights     Order Specific Question:   Certification     Answer:   I certify that inpatient services are medically necessary for this patient for a duration of greater than two midnights  See H&P and MD Progress Notes for additional information about the patient's course of treatment  ED Arrival Information     Expected Arrival Acuity Means of Arrival Escorted By Service Admission Type    - 5/20/2021 01:57 Emergent Walk-In Self General Medicine Emergency    Arrival Complaint    neck pain        Chief Complaint   Patient presents with    Neck Swelling     Patient reports known hx blood clots in neck - admitted to [facility] [place] but left AMA  Reports taking anticoags as prescribed, but concerned d/t increase to size of neck mass  Additionally reports relapse on drugs - using cocaine daily  Initial Presentation: 30 yo female PMH of IV drug use-noncompliant with MAT for opioid use , H/o endocarditis with tricuspid valve replacement in 2020, H/o neck DVT chronically anticoagulated with Eliquis, retained needle in left neck to ED from home admitted Inpatient due to Neck swelling/pain , Abnormal CT chest, Polysubstance abuse, retained foreign body of neck, chronic anticoagulation, Bipolar 1 disorder  Reports injecting coke into her right neck with chronic neck swelling increased up to her jaw with associated increase in pain, worse with movement  Took Oxy( given on DC from [facility] last week)  Reports associated sore throat, breathing feeling "weird"   IN ED: UDS + for opiate & oxycodone  Vehemently denies Heroine use  CT reveals linear metallic foreign body to left neck; multifocal nodular opacities in RUL suspicious for infectious/inflammatory process  COVID pending  Cont IV antibx, obtain sputum specimen, strep & legionella antigen  Follow pro calcitonin, monitor respiratory status  Cont home meds  Consult Infectious Disease/ Behavioral health   5/20 Infectious Disease  2 previous hospital admission for the same complaints over the past month for neck pain with admission to [facility] left AMA  Reports last IV drug use 1 day prior to presentation w progression of neck pain w swelling  Right sternocleidomastoid muscle myositis, possibly infectious  Cont IV Vanco- if blood cultures negative ancipate transition to PO antibx in next 24-48 HR, screen HIV  Reports dysuria- check UA w reflex culture  Start IV ceftriaxone for 3 days of antibx RX-obtain gonorrhea & chlamydia PCR  Abnormal CT : clinically no sign of PNA, consider alveolar opacity relation to recurrent aspiration events who admits IVdrug abuse & possibly passing out  5/20 Behavioral Health  would benefit from  one atypical antipsychotic and  stop abilify; increase seroquel to 100mg QHS  Would not increase klonopin at discharge   referral to outpatient dual diagnosis for continuity of care  PDMP reviewed and last received sublocade 5/2021  Given history of possible bipolar disorder would not use SSRI but will follow-up 5/21 to get better lucie history and discuss other treatment options  Date: 5/21/2021   Day 2:   Cont IV Vanco; BCX pending  COVID 19 negative- abn CT chest poss realted to chronic aspiration given polysubstance abuse  DC abilify, cont Klonopin/ seroquel/ eliquis   Consult case management   ED Triage Vitals [05/20/21 0219]   Temperature Pulse Respirations Blood Pressure SpO2   99 °F (37 2 °C) 80 18 147/80 100 %      Temp Source Heart Rate Source Patient Position - Orthostatic VS BP Location FiO2 (%)   Oral Monitor Sitting Right arm --      Pain Score       8          Wt Readings from Last 1 Encounters:   05/20/21 62 6 kg (138 lb 0 1 oz)     Additional Vital Signs:   Date/Time  Temp  Pulse  Resp  BP  MAP (mmHg)  SpO2  O2 Device  Patient Position - Orthostatic VS   05/21/21 0700  98 2 °F (36 8 °C)  56  18  100/62  --  100 %  --  Lying   05/20/21 2200  97 9 °F (36 6 °C)  68  18  126/50  --  100 %  None (Room air)  Sitting   05/20/21 1500  98 °F (36 7 °C)  81  18  98/55  --  94 %  None (Room air)  Lying   05/20/21 1230  98 3 °F (36 8 °C)  86  17  98/52  --  96 %  None (Room air)  Lying   05/20/21 1223  --  89  16  101/60  76  96 %  None (Room air)  Lying   05/20/21 0919  --  66  16  135/58  --  96 %  None (Room air)  Sitting   05/20/21 0439  --  --  --  --  --  --  None (Room air)  --   05/20/21 0430  --  71  17  131/82  --  99 %  None (Room air)  Sitting   05/20/21 0219  99 °F (37 2 °C)  80  18  147/80  --  100 %  None (Room air)  Sitting      Weights (last 14 days)    Date/Time  Weight  Weight Method  Height   05/20/21 1230  62 6 kg (138 lb 0 1 oz)  --  5' 5" (1 651 m)   05/20/21 0219  62 6 kg (138 lb)  Bed scale  5' 5" (1 651 m       Pertinent Labs/Diagnostic Test Results:   Results from last 7 days   Lab Units 05/20/21  0614   SARS-COV-2  Negative     Results from last 7 days   Lab Units 05/20/21  0256   WBC Thousand/uL 9 82   HEMOGLOBIN g/dL 13 4   HEMATOCRIT % 40 7   PLATELETS Thousands/uL 294   NEUTROS ABS Thousands/µL 6 68         Results from last 7 days   Lab Units 05/20/21  0256   SODIUM mmol/L 137   POTASSIUM mmol/L 3 5   CHLORIDE mmol/L 100   CO2 mmol/L 28   ANION GAP mmol/L 9   BUN mg/dL 14   CREATININE mg/dL 0 73   EGFR ml/min/1 73sq m 112   CALCIUM mg/dL 9 5     Results from last 7 days   Lab Units 05/20/21  0256   AST U/L 32   ALT U/L 48   ALK PHOS U/L 102   TOTAL PROTEIN g/dL 9 3*   ALBUMIN g/dL 4 8   TOTAL BILIRUBIN mg/dL 0 48         Results from last 7 days   Lab Units 05/20/21  0256   GLUCOSE RANDOM mg/dL 90 No results found for: BETA-HYDROXYBUTYRATE               Results from last 7 days   Lab Units 05/20/21  0256   CK TOTAL U/L 96             Results from last 7 days   Lab Units 05/20/21  0256   PROTIME seconds 13 6   INR  1 03   PTT seconds 30             Results from last 7 days   Lab Units 05/20/21  0256   LACTIC ACID mmol/L 1 0         Results from last 7 days   Lab Units 05/20/21  2210   CLARITY UA  Clear   COLOR UA  Yellow   SPEC GRAV UA  1 025   PH UA  6 0   GLUCOSE UA mg/dl Negative   KETONES UA mg/dl Negative   BLOOD UA  Negative   PROTEIN UA mg/dl Negative   NITRITE UA  Negative   BILIRUBIN UA  Negative   UROBILINOGEN UA E U /dl 0 2   LEUKOCYTES UA  Negative     Results from last 7 days   Lab Units 05/20/21  2211   STREP PNEUMONIAE ANTIGEN, URINE  Negative   LEGIONELLA URINARY ANTIGEN  Negative         Results from last 7 days   Lab Units 05/20/21  0346   AMPH/METH  Negative   BARBITURATE UR  Negative   BENZODIAZEPINE UR  Negative   COCAINE UR  Positive*   METHADONE URINE  Negative   OPIATE UR  Positive*   PCP UR  Negative   THC UR  Positive*                     Results from last 7 days   Lab Units 05/20/21  0256 05/20/21  0242   BLOOD CULTURE  No Growth at 24 hrs  No Growth at 24 hrs  CT chest without contrast      Final Result by Radha Sánchez DO (05/20 4467)   1  Linear metallic foreign body in the left neck, similar to the prior studies, most compatible with a needle fragment  This appears similar to the most recent CT soft tissue neck on May 13, 2021       2   Interval development of nodular groundglass infiltrates in the right upper lobe and right middle lobe  Findings compatible with multifocal/multi lobar pneumonia  In the setting of clinically suspected/proven COVID-19, the above lung parenchymal    findings on CT indicate intermediate confidence level for COVID-19     CT soft tissue neck w contrast   Final Result by MD (05/20 7359)      Mild enlargement of the right sternocleidomastoid muscle with adjacent subcutaneous inflammatory stranding suspicious for an infectious or inflammatory process such as a myositis  There is no focal fluid collection to suggest an abscess  Reidentified retained needle within the subcutaneous soft tissues of left supraclavicular fossa  Previously described multiple radiopaque densities noted adjacent to the left subclavian vein are poorly visualized on this study secondary to venous    contamination from IV contrast administration  Multifocal scattered nodular opacities at the visualized right upper lobe which are new when compared to the prior exam and suspicious for an infectious or inflammatory process in this patient with a previous history of septic emboli  Consider a    dedicated CT chest for further evaluation       No ekg  ED Treatment:   Medication Administration from 05/20/2021 0157 to 05/20/2021 1240       Date/Time Order Dose Route Action     05/20/2021 0256 sodium chloride 0 9 % bolus 500 mL 500 mL Intravenous New Bag     05/20/2021 0616 sodium chloride 0 9 % infusion 125 mL/hr Intravenous New Bag     05/20/2021 0300 morphine (PF) 4 mg/mL injection 4 mg 4 mg Intravenous Given     05/20/2021 0300 ondansetron (ZOFRAN) injection 4 mg 4 mg Intravenous Given     05/20/2021 0415 iohexol (OMNIPAQUE) 350 MG/ML injection (SINGLE-DOSE) 85 mL 85 mL Intravenous Given     05/20/2021 0504 morphine (PF) 4 mg/mL injection 4 mg 4 mg Intravenous Given     05/20/2021 0627 cefepime (MAXIPIME) 1,000 mg in dextrose 5 % 50 mL IVPB 1,000 mg Intravenous New Bag     05/20/2021 0639 vancomycin (VANCOCIN) 1,250 mg in sodium chloride 0 9 % 250 mL IVPB 1,250 mg Intravenous New Bag     05/20/2021 0629 doxycycline (VIBRAMYCIN) 100 mg in sodium chloride 0 9 % 100 mL IVPB 100 mg Intravenous New Bag     05/20/2021 0832 sodium chloride 0 9 % infusion 100 mL/hr Intravenous Rate/Dose Change     05/20/2021 0923 apixaban (ELIQUIS) tablet 5 mg 5 mg Oral Given 05/20/2021 0924 ARIPiprazole (ABILIFY) tablet 10 mg 10 mg Oral Given     05/20/2021 0813 clonazePAM (KlonoPIN) tablet 0 5 mg 0 5 mg Oral Given     05/20/2021 0810 gabapentin (NEURONTIN) capsule 300 mg 300 mg Oral Given     05/20/2021 0810 methocarbamol (ROBAXIN) tablet 750 mg 750 mg Oral Given     05/20/2021 0813 nicotine (NICODERM CQ) 14 mg/24hr TD 24 hr patch 1 patch 1 patch Transdermal Medication Applied     05/20/2021 0928 cloNIDine (CATAPRES-TTS-1) 0 1 mg/24 hr TD weekly patch 0 1 mg Transdermal Medication Applied     05/20/2021 0920 HYDROmorphone (DILAUDID) injection 1 mg 1 mg Intravenous Given        Past Medical History:   Diagnosis Date    Abnormal Pap smear of cervix     Anxiety     Depression     Endocarditis     2018    Hepatitis C     HPV (human papilloma virus) anogenital infection      Present on Admission:   Bipolar 1 disorder ([facility] )   Retained foreign body of neck      Admitting Diagnosis: Neck pain [M54 2]  Neck swelling [R22 1]  [facility]ysub[facility] ([facility] ) [F19 10]  Foreign body (FB) in soft tissue [M79 5]  Bipolar 1 disorder ([facility] ) [F31 9]  Multifocal pneumonia [J18 9]  Age/Sex: 31 y o  female  Admission Orders:  Contact & airborne isolation    Aqua K q1 hr  Up OOB as tolerated  Scheduled Medications:  acetaminophen, 975 mg, Oral, Q6H [address]  apixaban, 5 mg, Oral, BID  cefTRIAXone, 1,000 mg, Intravenous, Q24H  clonazePAM, 0 5 mg, Oral, BID  cloNIDine, 0 1 mg, Transdermal, Weekly  gabapentin, 300 mg, Oral, BID  ketorolac, 15 mg, Intravenous, Q6H JEFF  methocarbamol, 750 mg, Oral, Q6H [address]  nicotine, 1 patch, Transdermal, Daily  QUEtiapine, 100 mg, Oral, HS  senna, 1 tablet, Oral, Daily  vancomycin, 20 mg/kg, Intravenous, Q12H    Continuous IV Infusions:  sodium chloride, 100 mL/hr, Intravenous, Continuous      PRN Meds:  HYDROmorphone, 1 mg, Intravenous, Q4H PRN  ipratropium-albuterol, 3 mL, Nebulization, Q6H PRN  oxyCODONE, 10 mg, Oral, Q4H PRN  oxyCODONE, 5 mg, Oral, Q4H PRN    IP CONSULT TO INFECTIOUS DISEASES  IP CONSULT TO PSYCHIATRY  IP CONSULT TO CASE MANAGEMENT  IP CONSULT TO PHARMACY    Network Utilization Review Department  ATTENTION: Please call with any questions or concerns to [phone] and carefully listen to the prompts so that you are directed to the right person  All voicemails are confidential   Stacy Grand all requests for admission clinical reviews, approved or denied determinations and any other requests to dedicated fax number below belonging to the campus where the patient is receiving treatment   List of dedicated fax numbers for the Facilities:  [address] DENIALS (Administrative/Medical Necessity) [phone]   [address] (Maternity/NICU/Pediatrics) [phone]   [address] [facility] [address] [place] [address] [address] Zenaida Lui [address] [facility] [facility] [phone]

## 2021-05-21 NOTE — ASSESSMENT & PLAN NOTE
· Presentation: Patient presents with complaints of worsening neck swelling and pain for the past day  Patient reports recent IVDA at site on neck  · CT soft tissue neck: "Mild enlargement of the right sternocleidomastoid muscle with adjacent subcutaneous inflammatory stranding suspicious for an infectious or inflammatory process such as a myositis  There is no focal fluid collection to suggest an abscess "  · CT chest: "Linear metallic foreign body in the left neck, similar to the prior studies, most compatible with a needle fragment    This appears similar to the most recent CT soft tissue neck on May 13, 2021 "  · Pain control  · ID consult;   · Continue vancomycin  · Bcx: pending

## 2021-05-21 NOTE — UTILIZATION REVIEW
Inpatient Admission Authorization Request   NOTIFICATION OF INPATIENT ADMISSION/INPATIENT AUTHORIZATION REQUEST   SERVICING FACILITY:   [address]  Tax ID: [id number]  NPI: [id number]  [facility]: [facility]patient [facility]  [address]  Place of Service Code: 24     ATTENDING PROVIDER:  Attending Name and NPI#: Norma Franklin Md [[id number]]  Address: [address]  Phone: [phone]     UTILIZATION REVIEW CONTACT:  Son Carranza Utilization   Network Utilization Review Department  Phone: [phone]  Fax: [phone]  Email: [email or web address]     PHYSICIAN ADVISORY SERVICES:  FOR [id number] REVIEW - MEDICAL NECESSITY DENIAL  Phone: [phone]  Fax: [phone]  Email: [email or web address]     TYPE OF REQUEST:  Inpatient Status     ADMISSION INFORMATION:  ADMISSION DATE/TIME: 5/20/21 0616  PATIENT DIAGNOSIS CODE/DESCRIPTION:  Neck pain [M54 2]  Neck swelling [R22 1]  Polysubstance abuse ([address] ) [F19 10]  Foreign body (FB) in soft tissue [M79 5]  Bipolar 1 disorder ([address] ) [F31 9]  Multifocal pneumonia [J18 9]  DISCHARGE DATE/TIME: No discharge date for patient encounter  DISCHARGE DISPOSITION (IF DISCHARGED): Home/Self Care     IMPORTANT INFORMATION:  Please contact the Son Carranza directly with any questions or concerns regarding this request  Department voicemails are confidential     Send requests for admission clinical reviews, concurrent reviews, approvals, and administrative denials due to lack of clinical to fax [phone]

## 2021-05-21 NOTE — PROGRESS NOTES
[facility]  Progress Note - Meredith Rod 1990, 31 y o  female MRN: [id number]  Unit/Bed#: S -01 Encounter: [id number]  Primary Care Provider: No primary care provider on file  Date and time admitted to hospital: 5/20/2021  2:16 AM    * Neck swelling  Assessment & Plan  · Presentation: Patient presents with complaints of worsening neck swelling and pain for the past day  Patient reports recent IVDA at site on neck  · CT soft tissue neck: "Mild enlargement of the right sternocleidomastoid muscle with adjacent subcutaneous inflammatory stranding suspicious for an infectious or inflammatory process such as a myositis  There is no focal fluid collection to suggest an abscess "  · CT chest: "Linear metallic foreign body in the left neck, similar to the prior studies, most compatible with a needle fragment  This appears similar to the most recent CT soft tissue neck on May 13, 2021 "  · Pain control  · ID consult;   · Continue vancomycin  · Bcx: pending     Retained foreign body of neck  Assessment & Plan  · CT chest: "Linear metallic foreign body in the left neck, similar to the prior studies, most compatible with a needle fragment  This appears similar to the most recent CT soft tissue neck on May 13, 2021 "    Abnormal CT of the chest  Assessment & Plan  · CT chest: "Interval development of nodular groundglass infiltrates in the right upper lobe and right middle lobe "  Patient did not meet SIRS criteria upon admission  COVID-19: negative   Given lack of cough, fever, or other pulmonary symptoms, do not believe pt has pneumonia  Possibly related to chronic aspiration given her polysubstance abuse  Bipolar 1 disorder (HCC)  Assessment & Plan  · Continue Klonopin and Seroquel  · Psych consulted  · Dc abilify     Chronic anticoagulation  Assessment & Plan  · Continue Eliquis      Polysubstance abuse ([facility] [address] )  Assessment & Plan  · UDS: Cocaine+, opiate+, THC+, oxycodone+  · Clonidine 0 2 mg BID  · Case management consult  VTE Pharmacologic Prophylaxis:   Pharmacologic: Apixaban (Eliquis)  Mechanical VTE Prophylaxis in Place: No    Patient Centered Rounds: I have performed bedside rounds with nursing staff today  Discussions with Specialists or Other Care Team Provider:     Education and Discussions with Family / Patient: patient     Time Spent for Care: 30 minutes  More than 50% of total time spent on counseling and coordination of care as described above  Current Length of Stay: 1 day(s)    Current Patient Status: Inpatient   Certification Statement: The patient will continue to require additional inpatient hospital stay due to myositis on IV abx  Discharge Plan / Estimated Discharge Date: TBD based on clinical course    Code Status: Level 1 - Full Code      Subjective:   Pt feels about the same as yesterday  Still has pain in her neck  No trouble swallowing  Is somnolent  Objective:     Vitals:   Temp (24hrs), Av 1 °F (36 7 °C), Min:97 9 °F (36 6 °C), Max:98 3 °F (36 8 °C)    Temp:  [97 9 °F (36 6 °C)-98 3 °F (36 8 °C)] 98 2 °F (36 8 °C)  HR:  [56-89] 56  Resp:  [16-18] 18  BP: ()/(50-62) 100/62  SpO2:  [94 %-100 %] 100 %  Body mass index is 22 97 kg/m²  Input and Output Summary (last 24 hours): Intake/Output Summary (Last 24 hours) at 2021 0844  Last data filed at 2021 0615  Gross per 24 hour   Intake 780 ml   Output 0 ml   Net 780 ml       Physical Exam:     Physical Exam  Vitals signs and nursing note reviewed  Constitutional:       General: She is not in acute distress  Appearance: Normal appearance  She is not ill-appearing or toxic-appearing  HENT:      Head: Normocephalic and atraumatic  Neck:      Musculoskeletal: Muscular tenderness present  No neck rigidity  Neurological:      General: No focal deficit present  Mental Status: She is lethargic             Additional Data:     Labs:    Results from last 7 days   Lab Units 05/20/21  0256   WBC Thousand/uL 9 82   HEMOGLOBIN g/dL 13 4   HEMATOCRIT % 40 7   PLATELETS Thousands/uL 294   NEUTROS PCT % 69   LYMPHS PCT % 23   MONOS PCT % 5   EOS PCT % 3     Results from last 7 days   Lab Units 05/20/21  0256   POTASSIUM mmol/L 3 5   CHLORIDE mmol/L 100   CO2 mmol/L 28   BUN mg/dL 14   CREATININE mg/dL 0 73   CALCIUM mg/dL 9 5   ALK PHOS U/L 102   ALT U/L 48   AST U/L 32     Results from last 7 days   Lab Units 05/20/21  0256   INR  1 03       * I Have Reviewed All Lab Data Listed Above  * Additional Pertinent Lab Tests Reviewed: All Labs Within Last 24 Hours Reviewed    Imaging:    Imaging Reports Reviewed Today Include:   Imaging Personally Reviewed by Myself Includes:      Recent Cultures (last 7 days):     Results from last 7 days   Lab Units 05/20/21  2211 05/20/21  0256 05/20/21  0242   BLOOD CULTURE   --  No Growth at 24 hrs  No Growth at 24 hrs     LEGIONELLA URINARY ANTIGEN  Negative  --   --        Last 24 Hours Medication List:   Current Facility-Administered Medications   Medication Dose Route Frequency Provider Last Rate    acetaminophen  975 mg Oral Q6H [facility] Shelly Rutledge PA-C      apixaban  5 mg Oral BID Shelly Rutledge PA-C      cefTRIAXone  1,000 mg Intravenous Q24H Wiliam MD Henri 1,000 mg (05/20/21 7419)    clonazePAM  0 5 mg Oral BID Shelly Rutledge PA-C      cloNIDine  0 1 mg Transdermal Weekly Shelly Rutledge PA-C      gabapentin  300 mg Oral BID Shelly Rutledge PA-C      HYDROmorphone  1 mg Intravenous Q4H PRN Massimo Styles MD      ipratropium-albuterol  3 mL Nebulization Q6H PRN Shelly Rutledge PA-C      ketorolac  15 mg Intravenous Q6H [facility] Massimo Styles MD      methocarbamol  750 mg Oral Q6H [facility] Shelly Rutledge PA-C      nicotine  1 patch Transdermal Daily [place], [place]      oxyCODONE  10 mg Oral Q4H PRN Massimo Styles MD      oxyCODONE  5 mg Oral Q4H PRN Shelly Rutledge PA-C      QUEtiapine  100 mg Oral HS MD Tiffanie Gutierrez  1 tablet Oral Daily Shelly Rutledge PA-C      sodium chloride  100 mL/hr Intravenous Continuous ShellyJESSICA SuC 100 mL/hr (05/20/21 5992)    vancomycin  20 mg/kg Intravenous Q12H Claudette Breaker, CRNP 1,250 mg (05/21/21 2762)        Today, Patient Was Seen By: Santo Bamberger, MD    ** Please Note: This note has been constructed using a voice recognition system   **

## 2021-05-21 NOTE — CASE MANAGEMENT
CM attempted to meet with Pt to completed an assessment, identify and discuss the HOST program  Pt requested CM comes back at another time  CM will continue to follow

## 2021-05-21 NOTE — PROGRESS NOTES
Progress Note - Infectious Disease   Jamison Shipley 31 y o  female MRN: [id number]  Unit/Bed#: S -01 Encounter: [id number]      Impression/Plan:    Right sternocleidomastoid muscle myositis, possibly infectious  Patient has neck swelling from recent IV drug abuse, concerns for infectious etiology, likely related to frequent needlestick injury, denies leaking the needlestick, or using tap water to to clean needle, reports using new needles    5/20 CT of the neck showed Mild enlargement of the right sternocleidomastoid muscle with adjacent subcutaneous inflammatory stranding suspicious for an infectious or inflammatory process such as a myositis  Gerardine Washington is no focal fluid collection to suggest an abscess  Reidentified retained needle within the subcutaneous soft tissues of left supraclavicular fossa  · Patient currently on vancomycin IV 20 milligram/kilogram every 12 hours with pharmacy consult, will continue for now  · Will likely transition to pills in the next 24 hours  · Awaiting final culture results, preliminary results is negative for 24 hours    Abnormal CT chest:  No signs of pneumonia, no respiratory symptoms, oxygen saturation acceptable, may be related to chronic aspiration from IV drug use    Strep and Legionella urine negative    CT chest: nodular groundglass infiltrates in the right upper lobe and right middle lobe  COVID negative  · Off antibiotics for now      Suspected UTI  Was on antibiotics Keflex for 2 days  Urinalysis unremarkable likely because of 3 day antibiotic use  · Patient currently on ceftriaxone 1 g daily 2/3 last dose la      Hepatitis-C chronically  Still with active injection drug use   · Patient reported she is willing to quit now  · Liver enzymes stable  · Will need GI follow-up as an outpatient for treatment of hepatitis-C      Possible STD exposure/however patient denies this and that the story was made up  Per record had unprotected sexual exposure     Gonorrhea are p r n   Chlamydia however was negative  According the patient she did not take  post exposure prophylaxis with ISENTRESS and Truvada that was supposed to be for  28 days  Patient also did not follow-up with lab work and HIV screening for 6 weeks, 3 months and 6 months  · Rapid HIV test negative  · Urine gonorrhea and chlamydia still in progress, will treat prophylactically with Ceftriaxone 1gIV last dose la and Azithromycin 1g PO x1       Left clavicular foreign body/retained needle fragment from IVDU     · Per previous records surgery had no plans for any intervention     History of MSSA bacteremia and TV endocarditis, status post TV repair    Patient completed 6 weeks course of IV antibiotic     · Patient does not have any murmur  · Blood culture negative for 24 hours  · Denies any fever   · 2021 echo no vegetation seen     Active IVDU  · Patient is willing to quit       Antibiotics:  Day 2    Subjective:  Patient has no fever, chills, sweats; no nausea, vomiting, diarrhea; no cough, shortness of breath; still has right neck pain    Objective:  Vitals:  Temp:  [97 9 °F (36 6 °C)-98 2 °F (36 8 °C)] 98 2 °F (36 8 °C)  HR:  [56-81] 56  Resp:  [18] 18  BP: ()/(50-62) 100/62  SpO2:  [94 %-100 %] 100 %  Temp (24hrs), Av °F (36 7 °C), Min:97 9 °F (36 6 °C), Max:98 2 °F (36 8 °C)  Current: Temperature: 98 2 °F (36 8 °C)        Labs, Imaging, & Other studies:   All pertinent labs and imaging studies were personally reviewed  Results from last 7 days   Lab Units 21  0256   WBC Thousand/uL 9 82   HEMOGLOBIN g/dL 13 4   PLATELETS Thousands/uL 294     Results from last 7 days   Lab Units 21  0256   POTASSIUM mmol/L 3 5   CHLORIDE mmol/L 100   CO2 mmol/L 28   BUN mg/dL 14   CREATININE mg/dL 0 73   EGFR ml/min/1 73sq m 112   CALCIUM mg/dL 9 5   AST U/L 32   ALT U/L 48   ALK PHOS U/L 102     Results from last 7 days   Lab Units 21  2211 21  0256 21  0242   BLOOD CULTURE   -- No Growth at 24 hrs  No Growth at 24 hrs  LEGIONELLA URINARY ANTIGEN  Negative  --   --        Physical Exam:     Physical Exam  Vitals signs reviewed  HENT:      Head: Normocephalic  Right Ear: Tympanic membrane normal       Left Ear: Tympanic membrane normal       Nose: Nose normal       Mouth/Throat:      Mouth: Mucous membranes are moist    Eyes:      Extraocular Movements: Extraocular movements intact  Conjunctiva/sclera: Conjunctivae normal       Pupils: Pupils are equal, round, and reactive to light  Neck:      Musculoskeletal: Normal range of motion  Muscular tenderness (Right side of the neck) present  Comments: Bruising on the right neck, no redness or erythema  Cardiovascular:      Rate and Rhythm: Normal rate and regular rhythm  Pulses: Normal pulses  Pulmonary:      Effort: Pulmonary effort is normal       Breath sounds: Normal breath sounds  Abdominal:      General: Abdomen is flat  Bowel sounds are normal       Palpations: Abdomen is soft  Musculoskeletal: Normal range of motion  Right lower leg: No edema  Left lower leg: No edema  Lymphadenopathy:      Cervical: No cervical adenopathy  Skin:     General: Skin is warm and dry  Capillary Refill: Capillary refill takes less than 2 seconds  Neurological:      General: No focal deficit present  Mental Status: She is alert and oriented to person, place, and time  Mental status is at baseline        Comments: Drowsy

## 2021-05-21 NOTE — PROGRESS NOTES
Progress Note - [address] [employer] [address] SARAH Stevens 31 y o  female MRN: [id number]  Unit/Bed#: S -01 Encounter: [id number]    Assessment/Plan   Principal Problem:    Neck swelling  Active Problems:    Bipolar 1 disorder (HCC)    Chronic anticoagulation    Retained foreign body of neck    Abnormal CT of the chest    Polysubstance abuse ([facility] [address] )    Rubina Mauricio is a 31 y o  female with past medical history of IV drug abuse, endocarditis with tricuspid valve replacement, bipolar disorder and retained needle on the L side of the neck is being seen by psychiatry for management of her manic symptoms as well as substance abuse  Patient currently abusing cocaine at least once a week by injecting it in her neck  Medications prior to admission include Abilify and klonopin 0 5 mg b i d  she does not feel are helpful controlling her manic symptoms  However she later endorsed that she has not been taking her abilify  Patient endorses feeling more relief with Paxil and klonopin 1 mg b i d  She denies any previous benzodiazepine abuse  She has undergone treatment with rehabilitation multiple times and failed  UDS on arrival was positive for cocaine, opiates, THC and oxycodone  Does not follow with psychiatry as outpatient      After discussion of medications with her, will stop the Seroquel, restart the Abilify and try prazosin at bedtime to aid in sleep    Would not start on SSRI or SNRI during the acute phase of her recovery to avoid exacerbating manic symptoms    Defer to outpatient psychiatrist or practitioner handling her psychiatric medications going up on Klonopin dose, however will not make any changes to her current dose of 0 5 mg which she is to continue   Evaluation by HOST pending    Continue to promote patient participation in therapeutic milieu   Continue medical management per medicine (SLIM)  Subjective:  Patient evaluated this p m  for continuity of care    No acute distress noted throughout evaluation  Edmra Rosales denied suicidal/homicidal ideation; consents for safety  She states that she feels less manic now that she was given the abilify  Admits to not taking it at home  She is concerned about what medication combination would be best for her management  Goal is to return home, get clean and stop abusing drugs, get a job to be able to afford her own apartment  She is questioning establishing care with psychiatry  Current Medications:  Current Facility-Administered Medications   Medication Dose Route Frequency Provider Last Rate    acetaminophen  975 mg Oral Q6H [facility] Shelly Rutledge PA-C      apixaban  5 mg Oral BID Shelly Rutledge PA-C      [START ON 5/22/2021] ARIPiprazole  10 mg Oral Daily MD Tiffanie Roth ON 5/22/2021] azithromycin  1,000 mg Oral Once Efren Oneil MD      [START ON 5/22/2021] cefTRIAXone  1,000 mg Intravenous Q24H Efren Oneil MD      clonazePAM  0 5 mg Oral BID Shelly Rutledge PA-C      cloNIDine  0 1 mg Transdermal Weekly Shelly Rutledge PA-C      gabapentin  300 mg Oral BID Shelly Rutledge PA-C      HYDROmorphone  1 mg Intravenous Q4H PRN Garfield Morelos MD      ipratropium-albuterol  3 mL Nebulization Q6H PRN Shelly Rutledge PA-C      ketorolac  15 mg Intravenous Q6H Elo Landrum MD      melatonin  3 mg Oral HS Jamel aCbrera MD      methocarbamol  750 mg Oral Q6H [facility] Shelly Rutledge PA-C      nicotine  1 patch Transdermal Daily [place], [place]      oxyCODONE  10 mg Oral Q4H PRN Garfield Morelos MD      oxyCODONE  5 mg Oral Q4H PRN Shelly Rutledge PA-C      prazosin  1 mg Oral HS aJmel Cabrera MD      senna  1 tablet Oral Daily Shelly [facility] [place]      sodium chloride  100 mL/hr Intravenous Continuous Shelly Rutledge PA-C 100 mL/hr (05/20/21 1586)    vancomycin  20 mg/kg Intravenous Q12H BOO Cloud 1,250 mg (05/21/21 4378)       Behavioral Health Medications: all current active meds have been reviewed      Vital signs in last 24 hours:  Temp:  [97 9 °F (36 6 °C)-98 2 °F (36 8 °C)] 98 °F (36 7 °C)  HR:  [56-68] 58  Resp:  [17-18] 17  BP: (100-126)/(50-62) 100/51    Laboratory results:  I have personally reviewed all pertinent laboratory/tests results  Psychiatric Review of Systems:  Behavior over the last 24 hours:  improved  Sleep: insomnia  Appetite: normal  Medication side effects: No  Review of systems: no complaints    Mental Status Evaluation:  Appearance:  disheveled   Behavior:  restless and fidgety   Speech:  pressured   Mood:  normal   Affect:  mood-congruent   Language naming objects and repeating phrases   Thought Process:  goal directed and logical   Thought Content:  normal   Perceptual Disturbances: None   Risk Potential: None    Sensorium:  person and place   Cognition:  recent and remote memory grossly intact   Consciousness:  alert and awake    Attention: attention span and concentration were age appropriate   Insight:  good   Judgment: fair   Intellect    Gait/Station: normal gait/station and normal balance   Motor Activity: no abnormal movements     Memory: Short and long term memory  Normal      Progress Toward Goals: Patient denies having any withdrawal symptoms    Recommended Treatment:   See above for assessment and plan       Continue following current medications:   Current Facility-Administered Medications   Medication Dose Route Frequency Provider Last Rate    acetaminophen  975 mg Oral Q6H [address] Shelly Rutledge PA-C      apixaban  5 mg Oral BID Shelly Rutledge PA-C      [START ON 5/22/2021] ARIPiprazole  10 mg Oral Daily MD Tiffanie Moser ON 5/22/2021] azithromycin  1,000 mg Oral Once Raymond Villarreal MD      [START ON 5/22/2021] cefTRIAXone  1,000 mg Intravenous Q24H Raymond Villarreal MD      clonazePAM  0 5 mg Oral BID Shelly Rutledge PA-C      cloNIDine  0 1 mg Transdermal Weekly Shelly Rutledge PA-C      gabapentin  300 mg Oral BID Shelly Rutledge PA-C      HYDROmorphone  1 mg Intravenous Q4H PRN Chapincito Jo MD      ipratropium-albuterol  3 mL Nebulization Q6H PRN Shelly Rutledge PA-C      ketorolac  15 mg Intravenous Q6H [address] Chapincito Jo MD      melatonin  3 mg Oral HS Sim Emmanuel MD      methocarbamol  750 mg Oral Q6H [address] [place], [place]      nicotine  1 patch Transdermal Daily [place], [place]      oxyCODONE  10 mg Oral Q4H PRN Chapincito Jo MD      oxyCODONE  5 mg Oral Q4H PRN Shelly Rutledge PA-C      prazosin  1 mg Oral HS Sim Emmanuel MD      senna  1 tablet Oral Daily [place], [place]      sodium chloride  100 mL/hr Intravenous Continuous Shelly Rutledge PA-C 100 mL/hr (05/20/21 6904)    vancomycin  20 mg/kg Intravenous Q12H BOO Howard 1,250 mg (05/21/21 3585)       Risks, benefits and possible side effects of Medications:   Risks, benefits, and possible side effects of medications explained to patient and patient verbalizes understanding  This note has been constructed using a voice recognition system  There may be translation, syntax,  or grammatical errors  If you have any questions, please contact the dictating provider  SARAH Hill    Psychiatry service PGY-2

## 2021-05-21 NOTE — ASSESSMENT & PLAN NOTE
· CT chest: "Interval development of nodular groundglass infiltrates in the right upper lobe and right middle lobe "  Patient did not meet SIRS criteria upon admission  COVID-19: negative   Given lack of cough, fever, or other pulmonary symptoms, do not believe pt has pneumonia  Possibly related to chronic aspiration given her polysubstance abuse

## 2021-05-22 VITALS
HEIGHT: 65 IN | OXYGEN SATURATION: 98 % | DIASTOLIC BLOOD PRESSURE: 53 MMHG | WEIGHT: 138.01 LBS | TEMPERATURE: 97.7 F | BODY MASS INDEX: 22.99 KG/M2 | HEART RATE: 50 BPM | RESPIRATION RATE: 18 BRPM | SYSTOLIC BLOOD PRESSURE: 89 MMHG

## 2021-05-22 LAB
C TRACH DNA SPEC QL NAA+PROBE: NEGATIVE
N GONORRHOEA DNA SPEC QL NAA+PROBE: NEGATIVE

## 2021-05-22 PROCEDURE — 99239 HOSP IP/OBS DSCHRG MGMT >30: CPT | Performed by: HOSPITALIST

## 2021-05-22 RX ORDER — PRAZOSIN HYDROCHLORIDE 1 MG/1
1 CAPSULE ORAL
Qty: 30 CAPSULE | Refills: 0 | Status: SHIPPED | OUTPATIENT
Start: 2021-05-22 | End: 2021-06-21

## 2021-05-22 RX ORDER — SULFAMETHOXAZOLE AND TRIMETHOPRIM 800; 160 MG/1; MG/1
1 TABLET ORAL EVERY 12 HOURS SCHEDULED
Qty: 8 TABLET | Refills: 0 | Status: SHIPPED | OUTPATIENT
Start: 2021-05-23 | End: 2021-05-27

## 2021-05-22 RX ORDER — GABAPENTIN 300 MG/1
300 CAPSULE ORAL 2 TIMES DAILY
Qty: 14 CAPSULE | Refills: 0 | Status: SHIPPED | OUTPATIENT
Start: 2021-05-22 | End: 2021-05-29

## 2021-05-22 RX ORDER — OXYCODONE HYDROCHLORIDE 5 MG/1
5 TABLET ORAL EVERY 6 HOURS PRN
Qty: 8 TABLET | Refills: 0 | Status: SHIPPED | OUTPATIENT
Start: 2021-05-22 | End: 2021-05-25

## 2021-05-22 RX ORDER — CLONAZEPAM 0.5 MG/1
0.5 TABLET ORAL 2 TIMES DAILY
Qty: 6 TABLET | Refills: 0 | Status: SHIPPED | OUTPATIENT
Start: 2021-05-22 | End: 2021-05-25

## 2021-05-22 RX ADMIN — METHOCARBAMOL TABLETS 750 MG: 750 TABLET, COATED ORAL at 06:09

## 2021-05-22 RX ADMIN — AZITHROMYCIN MONOHYDRATE 1000 MG: 250 TABLET ORAL at 11:39

## 2021-05-22 RX ADMIN — HYDROMORPHONE HYDROCHLORIDE 1 MG: 1 INJECTION, SOLUTION INTRAMUSCULAR; INTRAVENOUS; SUBCUTANEOUS at 00:10

## 2021-05-22 RX ADMIN — ACETAMINOPHEN 975 MG: 325 TABLET, FILM COATED ORAL at 06:10

## 2021-05-22 RX ADMIN — KETOROLAC TROMETHAMINE 15 MG: 30 INJECTION, SOLUTION INTRAMUSCULAR at 00:01

## 2021-05-22 RX ADMIN — PRAZOSIN HYDROCHLORIDE 1 MG: 1 CAPSULE ORAL at 00:00

## 2021-05-22 RX ADMIN — VANCOMYCIN HYDROCHLORIDE 1250 MG: 5 INJECTION, POWDER, LYOPHILIZED, FOR SOLUTION INTRAVENOUS at 06:09

## 2021-05-22 RX ADMIN — HYDROMORPHONE HYDROCHLORIDE 1 MG: 1 INJECTION, SOLUTION INTRAMUSCULAR; INTRAVENOUS; SUBCUTANEOUS at 06:10

## 2021-05-22 RX ADMIN — HYDROMORPHONE HYDROCHLORIDE 1 MG: 1 INJECTION, SOLUTION INTRAMUSCULAR; INTRAVENOUS; SUBCUTANEOUS at 11:34

## 2021-05-22 RX ADMIN — ACETAMINOPHEN 975 MG: 325 TABLET, FILM COATED ORAL at 00:01

## 2021-05-22 RX ADMIN — GABAPENTIN 300 MG: 300 CAPSULE ORAL at 09:32

## 2021-05-22 RX ADMIN — CLONAZEPAM 0.5 MG: 0.5 TABLET ORAL at 09:33

## 2021-05-22 RX ADMIN — OXYCODONE HYDROCHLORIDE 10 MG: 10 TABLET ORAL at 01:42

## 2021-05-22 RX ADMIN — METHOCARBAMOL TABLETS 750 MG: 750 TABLET, COATED ORAL at 00:01

## 2021-05-22 RX ADMIN — OXYCODONE HYDROCHLORIDE 10 MG: 10 TABLET ORAL at 14:26

## 2021-05-22 RX ADMIN — APIXABAN 5 MG: 5 TABLET, FILM COATED ORAL at 09:33

## 2021-05-22 RX ADMIN — CEFTRIAXONE SODIUM 1000 MG: 10 INJECTION, POWDER, FOR SOLUTION INTRAVENOUS at 13:17

## 2021-05-22 RX ADMIN — ARIPIPRAZOLE 10 MG: 5 TABLET ORAL at 09:33

## 2021-05-22 RX ADMIN — KETOROLAC TROMETHAMINE 15 MG: 30 INJECTION, SOLUTION INTRAMUSCULAR at 06:10

## 2021-05-22 RX ADMIN — Medication 3 MG: at 00:01

## 2021-05-22 RX ADMIN — OXYCODONE HYDROCHLORIDE 10 MG: 10 TABLET ORAL at 09:38

## 2021-05-22 NOTE — PROGRESS NOTES
Went over discharge instructions with patient  Patient stated her ride is on her way and she will wait outside for her ride

## 2021-05-22 NOTE — DISCHARGE INSTR - AVS FIRST PAGE
Dear Estrella Clarke,     It was our pleasure to care for you here at [facility], [address]  It is our hope that we were always able to exceed the expected standards for your care during your stay  You were hospitalized due to neck pain with myositis  You were cared for on the 3rd floor under the service of Tamara Warren MD with the [facility] who covers for your primary care physician (PCP), No primary care provider on file  , while you were hospitalized  If you have any questions or concerns related to this hospitalization, you may contact us at [phone]  For follow up as well as medication refills, we recommend that you follow up with your primary care physician  A registered nurse will reach out to you by phone within a few days after your discharge to answer any additional questions that you may have after going home  However, at this time we provide for you here, the most important instructions / recommendations at discharge:     · Notable Medication Adjustments -   · Bactrim for 4 additional days   · Prazosin at night   · Testing Required after Discharge -   · None   · Important follow up information -   · None   · Other Instructions -   · You may supplement with tylenol and ibuprofen  · Please review this entire after visit summary as additional general instructions including medication list, appointments, activity, diet, any pertinent wound care, and other additional recommendations from your care team that may be provided for you        Sincerely,     Tamara Warren MD

## 2021-05-22 NOTE — PLAN OF CARE
Problem: GENITOURINARY - ADULT  Goal: Maintains or returns to baseline urinary function  Description: INTERVENTIONS:  - Assess urinary function  - Encourage oral fluids to ensure adequate hydration if ordered  - Administer IV fluids as ordered to ensure adequate hydration  - Administer ordered medications as needed  - Offer frequent toileting  - Follow urinary retention protocol if ordered  Outcome: Completed  Goal: Absence of urinary retention  Description: INTERVENTIONS:  - Assess patients ability to void and empty bladder  - Monitor I/O  - Bladder scan as needed  - Discuss with physician/AP medications to alleviate retention as needed  - Discuss catheterization for long term situations as appropriate  Outcome: Completed  Goal: Urinary catheter remains patent  Description: INTERVENTIONS:  - Assess patency of urinary catheter  - If patient has a chronic yuen, consider changing catheter if non-functioning  - Follow guidelines for intermittent irrigation of non-functioning urinary catheter  Outcome: Completed     Problem: SKIN/TISSUE INTEGRITY - ADULT  Goal: Skin integrity remains intact  Description: INTERVENTIONS  - Identify patients at risk for skin breakdown  - Assess and monitor skin integrity  - Assess and monitor nutrition and hydration status  - Monitor labs (i e  albumin)  - Assess for incontinence   - Turn and reposition patient  - Assist with mobility/ambulation  - Relieve pressure over bony prominences  - Avoid friction and shearing  - Provide appropriate hygiene as needed including keeping skin clean and dry  - Evaluate need for skin moisturizer/barrier cream  - Collaborate with interdisciplinary team (i e  Nutrition, Rehabilitation, etc )   - Patient/family teaching  Outcome: Completed  Goal: Incision(s), wounds(s) or drain site(s) healing without S/S of infection  Description: INTERVENTIONS  - Assess and document risk factors for skin impairment   - Assess and document dressing, incision, wound bed, drain sites and surrounding tissue  - Consider nutrition services referral as needed  - Oral mucous membranes remain intact  - Provide patient/ family education  Outcome: Completed  Goal: Oral mucous membranes remain intact  Description: INTERVENTIONS  - Assess oral mucosa and hygiene practices  - Implement preventative oral hygiene regimen  - Implement oral medicated treatments as ordered  - Initiate Nutrition services referral as needed  Outcome: Completed     Problem: HEMATOLOGIC - ADULT  Goal: Maintains hematologic stability  Description: INTERVENTIONS  - Assess for signs and symptoms of bleeding or hemorrhage  - Monitor labs  - Administer supportive blood products/factors as ordered and appropriate  Outcome: Completed     Problem: HEMATOLOGIC - ADULT  Goal: Maintains hematologic stability  Description: INTERVENTIONS  - Assess for signs and symptoms of bleeding or hemorrhage  - Monitor labs  - Administer supportive blood products/factors as ordered and appropriate  Outcome: Completed

## 2021-05-23 NOTE — DISCHARGE SUMMARY
[facility]  Discharge- Odalis Emms 1990, 31 y o  female MRN: [id number]  Unit/Bed#: S -01 Encounter: [id number]  Primary Care Provider: No primary care provider on file  Date and time admitted to hospital: 5/20/2021  2:16 AM    * Neck swelling  Assessment & Plan  · Presentation: Patient presents with complaints of worsening neck swelling and pain for the past day  Patient reports recent IVDA at site on neck  · CT soft tissue neck: "Mild enlargement of the right sternocleidomastoid muscle with adjacent subcutaneous inflammatory stranding suspicious for an infectious or inflammatory process such as a myositis  There is no focal fluid collection to suggest an abscess "  · CT chest: "Linear metallic foreign body in the left neck, similar to the prior studies, most compatible with a needle fragment  This appears similar to the most recent CT soft tissue neck on May 13, 2021 "  · Pain control  · ID consult;   · Continue vancomycin--> transitioned to Bactrim to complete 7 d course  · Bcx: NGTD    Retained foreign body of neck  Assessment & Plan  · CT chest: "Linear metallic foreign body in the left neck, similar to the prior studies, most compatible with a needle fragment  This appears similar to the most recent CT soft tissue neck on May 13, 2021 "    Abnormal CT of the chest  Assessment & Plan  · CT chest: "Interval development of nodular groundglass infiltrates in the right upper lobe and right middle lobe "  Patient did not meet SIRS criteria upon admission  COVID-19: negative   Given lack of cough, fever, or other pulmonary symptoms, do not believe pt has pneumonia  Possibly related to chronic aspiration given her polysubstance abuse  Bipolar 1 disorder ([facility] [address] )  Assessment & Plan  · Continue Klonopin  · Psych consulted  · Cont abilify; dc seroquel    Chronic anticoagulation  Assessment & Plan  · Continue Eliquis      Polysubstance abuse ([facility] [address] )  Assessment & Plan  · UDS: Cocaine+, opiate+, THC+, oxycodone+  · Clonidine 0 2 mg BID  · Case management consult  Discharging Physician / Practitioner: Elvin Musa MD  PCP: No primary care provider on file  Admission Date:   Admission Orders (From admission, onward)     Ordered        05/20/21 0616  Inpatient Admission  Once                   Discharge Date: 05/22/21    Resolved Problems  Date Reviewed: 5/20/2021    None          Consultations During Hospital Stay:  · ID   · Psychiatry     Procedures Performed:   · None     Significant Findings / Test Results:   CT head and neck:  IMPRESSION:     Mild enlargement of the right sternocleidomastoid muscle with adjacent subcutaneous inflammatory stranding suspicious for an infectious or inflammatory process such as a myositis  There is no focal fluid collection to suggest an abscess      Reidentified retained needle within the subcutaneous soft tissues of left supraclavicular fossa  Previously described multiple radiopaque densities noted adjacent to the left subclavian vein are poorly visualized on this study secondary to venous   contamination from IV contrast administration      Multifocal scattered nodular opacities at the visualized right upper lobe which are new when compared to the prior exam and suspicious for an infectious or inflammatory process in this patient with a previous history of septic emboli  Consider a   dedicated CT chest for further evaluation  Incidental Findings:   · See above      Test Results Pending at Discharge (will require follow up): · None      Outpatient Tests Requested:  · None     Complications:  None     Reason for Admission: R sided neck pain with myositis       Hospital Course:     Sugar Charles is a 31 y o  female patient history of active IV drug abuse, MSSA bacteremia with tricuspid valve endocarditis status post TV on who originally presented to the hospital on 5/20/2021 due to right-sided neck pain after injection of cocaine  Patient has had 2 recent admissions for similar complaints  She underwent CT imaging in the ED which showed right-sided myositis of the SCM  There was no evidence of abscess or phlegmon  The retained needle fragment was still present on the left side  Patient was started on IV antibiotics with Infectious Disease input  Patient also reported some high risk sexual behavior so she was empirically treated with ceftriaxone  Her GC and chlamydia screen came back negative she did not require additional azithromycin  For 3 days of IV vancomycin patient reported some improvement in her symptoms and  Stable for discharge  She was transitioned to oral Bactrim with plan to complete 7 day course  Patient did receive short course of oral opiates on discharge  Will contact her primary physician on Monday  During patient's admission she was seen and evaluated by Psychiatry as she reported she had increasing symptoms of lucie  They recommended she take Abilify alone and recommended she discontinue  They also added prazosin to her regimen  Of note, patient's CT imaging on admission also reported scattered nodular opacities in the right upper lobe concerning for pneumonia  Patient had no shortness of breath, cough or other symptoms of pneumonia so this was not treated  Please see above list of diagnoses and related plan for additional information  Condition at Discharge: fair     Discharge Day Visit / Exam:     Subjective:  Feels well  Anxious to go  Neck pain is improving but still present   Vitals: Blood Pressure: (!) 89/53 (05/22/21 0658)  Pulse: (!) 50 (05/22/21 0658)  Temperature: 97 7 °F (36 5 °C) (05/22/21 0658)  Temp Source: Oral (05/22/21 0658)  Respirations: 18 (05/22/21 0658)  Height: 5' 5" (165 1 cm) (05/20/21 1230)  Weight - Scale: 62 6 kg (138 lb 0 1 oz) (05/20/21 1230)  SpO2: 98 % (05/22/21 4106)  Exam:   Physical Exam  Vitals signs and nursing note reviewed     Constitutional: General: She is not in acute distress  Appearance: She is not ill-appearing or diaphoretic  HENT:      Head: Normocephalic and atraumatic  Neck:      Musculoskeletal: Normal range of motion  No neck rigidity or muscular tenderness  Pulmonary:      Effort: Pulmonary effort is normal  No respiratory distress  Breath sounds: No wheezing or rhonchi  Abdominal:      General: Abdomen is flat  Palpations: Abdomen is soft  Neurological:      General: No focal deficit present  Mental Status: She is alert and oriented to person, place, and time  Discharge instructions/Information to patient and family:   See after visit summary for information provided to patient and family  Provisions for Follow-Up Care:  See after visit summary for information related to follow-up care and any pertinent home health orders  Disposition:     Home        Planned Readmission: none, but high risk given active IVDA     Discharge Statement:  I spent 45 minutes discharging the patient  This time was spent on the day of discharge  I had direct contact with the patient on the day of discharge  Greater than 50% of the total time was spent examining patient, answering all patient questions, arranging and discussing plan of care with patient as well as directly providing post-discharge instructions  Additional time then spent on discharge activities  Discharge Medications:  See after visit summary for reconciled discharge medications provided to patient and family        ** Please Note: This note has been constructed using a voice recognition system **

## 2021-05-23 NOTE — ASSESSMENT & PLAN NOTE
· Presentation: Patient presents with complaints of worsening neck swelling and pain for the past day  Patient reports recent IVDA at site on neck  · CT soft tissue neck: "Mild enlargement of the right sternocleidomastoid muscle with adjacent subcutaneous inflammatory stranding suspicious for an infectious or inflammatory process such as a myositis  There is no focal fluid collection to suggest an abscess "  · CT chest: "Linear metallic foreign body in the left neck, similar to the prior studies, most compatible with a needle fragment    This appears similar to the most recent CT soft tissue neck on May 13, 2021 "  · Pain control  · ID consult;   · Continue vancomycin--> transitioned to Bactrim to complete 7 d course  · Bcx: NGTD

## 2021-05-24 NOTE — UTILIZATION REVIEW
Notification of Discharge   This is a Notification of Discharge from our facility [address]  Please be advised that this patient has been discharge from our facility  Below you will find the admission and discharge date and time including the patients disposition  UTILIZATION REVIEW CONTACT:  Autumn Jones  Utilization   Network Utilization Review Department  Phone: [phone] x carefully listen to the prompts  All voicemails are confidential   Email: [email or web address]     PHYSICIAN ADVISORY SERVICES:  FOR [facility] REVIEW - MEDICAL NECESSITY DENIAL  Phone: [phone]  Fax: [phone]  Email: [email or web address]     PRESENTATION DATE: 5/20/2021  2:16 AM  OBERVATION ADMISSION DATE:   INPATIENT ADMISSION DATE: 5/20/21 0616   DISCHARGE DATE: 5/22/2021  3:23 PM  DISPOSITION: Home/Self Care Home/Self Care      IMPORTANT INFORMATION:  Send all requests for admission clinical reviews, approved or denied determinations and any other requests to dedicated fax number below belonging to the campus where the patient is receiving treatment   List of dedicated fax numbers:  [address] DENIALS (Administrative/Medical Necessity) [phone]   [address] [place] [address] (Maternity/NICU/Pediatrics) [phone]   Vijay Tyson [phone]   Nickie Dwyer [phone]   [facility] [phone]   Lila Lee [address] [phone]   [facility]  [phone]   [address] [place] [address], [address]  [address] [facility] 1000 [facility] [phone]

## 2021-05-25 ENCOUNTER — APPOINTMENT (EMERGENCY)
Dept: RADIOLOGY | Facility: HOSPITAL | Age: 31
End: 2021-05-25
Payer: COMMERCIAL

## 2021-05-25 ENCOUNTER — HOSPITAL ENCOUNTER (EMERGENCY)
Facility: HOSPITAL | Age: 31
Discharge: HOME | End: 2021-05-26
Attending: EMERGENCY MEDICINE | Admitting: EMERGENCY MEDICINE
Payer: COMMERCIAL

## 2021-05-25 DIAGNOSIS — J18.9 PNEUMONIA: Primary | ICD-10-CM

## 2021-05-25 DIAGNOSIS — M54.2 NECK PAIN: ICD-10-CM

## 2021-05-25 DIAGNOSIS — F19.10 SUBSTANCE ABUSE (HCC): ICD-10-CM

## 2021-05-25 DIAGNOSIS — R22.1 NECK SWELLING: ICD-10-CM

## 2021-05-25 LAB
BACTERIA BLD CULT: NORMAL
BACTERIA BLD CULT: NORMAL

## 2021-05-25 PROCEDURE — 99284 EMERGENCY DEPT VISIT MOD MDM: CPT

## 2021-05-25 PROCEDURE — 99285 EMERGENCY DEPT VISIT HI MDM: CPT | Performed by: EMERGENCY MEDICINE

## 2021-05-25 PROCEDURE — 71045 X-RAY EXAM CHEST 1 VIEW: CPT

## 2021-05-25 RX ORDER — SODIUM CHLORIDE 9 MG/ML
125 INJECTION, SOLUTION INTRAVENOUS CONTINUOUS
Status: DISCONTINUED | OUTPATIENT
Start: 2021-05-25 | End: 2021-05-26 | Stop reason: HOSPADM

## 2021-05-26 ENCOUNTER — APPOINTMENT (EMERGENCY)
Dept: CT IMAGING | Facility: HOSPITAL | Age: 31
End: 2021-05-26
Payer: COMMERCIAL

## 2021-05-26 VITALS
HEIGHT: 65 IN | RESPIRATION RATE: 16 BRPM | SYSTOLIC BLOOD PRESSURE: 109 MMHG | TEMPERATURE: 98.1 F | BODY MASS INDEX: 23.1 KG/M2 | HEART RATE: 70 BPM | DIASTOLIC BLOOD PRESSURE: 59 MMHG | WEIGHT: 138.67 LBS | OXYGEN SATURATION: 99 %

## 2021-05-26 LAB
ALBUMIN SERPL BCP-MCNC: 4.1 G/DL (ref 3.5–5)
ALP SERPL-CCNC: 90 U/L (ref 46–116)
ALT SERPL W P-5'-P-CCNC: 41 U/L (ref 12–78)
ANION GAP SERPL CALCULATED.3IONS-SCNC: 10 MMOL/L (ref 4–13)
APTT PPP: 32 SECONDS (ref 23–37)
AST SERPL W P-5'-P-CCNC: 55 U/L (ref 5–45)
BASOPHILS # BLD AUTO: 0.03 THOUSANDS/ΜL (ref 0–0.1)
BASOPHILS NFR BLD AUTO: 0 % (ref 0–1)
BILIRUB SERPL-MCNC: 0.55 MG/DL (ref 0.2–1)
BUN SERPL-MCNC: 22 MG/DL (ref 5–25)
CALCIUM SERPL-MCNC: 8.9 MG/DL (ref 8.3–10.1)
CHLORIDE SERPL-SCNC: 101 MMOL/L (ref 100–108)
CO2 SERPL-SCNC: 25 MMOL/L (ref 21–32)
CREAT SERPL-MCNC: 0.75 MG/DL (ref 0.6–1.3)
CRP SERPL HS-MCNC: 42.83 MG/L
EOSINOPHIL # BLD AUTO: 0.45 THOUSAND/ΜL (ref 0–0.61)
EOSINOPHIL NFR BLD AUTO: 5 % (ref 0–6)
ERYTHROCYTE [DISTWIDTH] IN BLOOD BY AUTOMATED COUNT: 13.2 % (ref 11.6–15.1)
GFR SERPL CREATININE-BSD FRML MDRD: 109 ML/MIN/1.73SQ M
GLUCOSE SERPL-MCNC: 87 MG/DL (ref 65–140)
HCG SERPL QL: NEGATIVE
HCT VFR BLD AUTO: 38.7 % (ref 34.8–46.1)
HGB BLD-MCNC: 12.6 G/DL (ref 11.5–15.4)
IMM GRANULOCYTES # BLD AUTO: 0.04 THOUSAND/UL (ref 0–0.2)
IMM GRANULOCYTES NFR BLD AUTO: 0 % (ref 0–2)
INR PPP: 1.07 (ref 0.84–1.19)
LACTATE SERPL-SCNC: 1 MMOL/L (ref 0.5–2)
LYMPHOCYTES # BLD AUTO: 2.78 THOUSANDS/ΜL (ref 0.6–4.47)
LYMPHOCYTES NFR BLD AUTO: 29 % (ref 14–44)
MCH RBC QN AUTO: 28.6 PG (ref 26.8–34.3)
MCHC RBC AUTO-ENTMCNC: 32.6 G/DL (ref 31.4–37.4)
MCV RBC AUTO: 88 FL (ref 82–98)
MONOCYTES # BLD AUTO: 0.7 THOUSAND/ΜL (ref 0.17–1.22)
MONOCYTES NFR BLD AUTO: 7 % (ref 4–12)
NEUTROPHILS # BLD AUTO: 5.52 THOUSANDS/ΜL (ref 1.85–7.62)
NEUTS SEG NFR BLD AUTO: 59 % (ref 43–75)
NRBC BLD AUTO-RTO: 0 /100 WBCS
PLATELET # BLD AUTO: 313 THOUSANDS/UL (ref 149–390)
PMV BLD AUTO: 8.7 FL (ref 8.9–12.7)
POTASSIUM SERPL-SCNC: 5 MMOL/L (ref 3.5–5.3)
PROT SERPL-MCNC: 8.3 G/DL (ref 6.4–8.2)
PROTHROMBIN TIME: 14 SECONDS (ref 11.6–14.5)
RBC # BLD AUTO: 4.4 MILLION/UL (ref 3.81–5.12)
SODIUM SERPL-SCNC: 136 MMOL/L (ref 136–145)
WBC # BLD AUTO: 9.52 THOUSAND/UL (ref 4.31–10.16)

## 2021-05-26 PROCEDURE — 84703 CHORIONIC GONADOTROPIN ASSAY: CPT | Performed by: EMERGENCY MEDICINE

## 2021-05-26 PROCEDURE — 70491 CT SOFT TISSUE NECK W/DYE: CPT

## 2021-05-26 PROCEDURE — 86141 C-REACTIVE PROTEIN HS: CPT | Performed by: EMERGENCY MEDICINE

## 2021-05-26 PROCEDURE — 96375 TX/PRO/DX INJ NEW DRUG ADDON: CPT

## 2021-05-26 PROCEDURE — 85610 PROTHROMBIN TIME: CPT | Performed by: EMERGENCY MEDICINE

## 2021-05-26 PROCEDURE — 36415 COLL VENOUS BLD VENIPUNCTURE: CPT | Performed by: EMERGENCY MEDICINE

## 2021-05-26 PROCEDURE — 83605 ASSAY OF LACTIC ACID: CPT | Performed by: EMERGENCY MEDICINE

## 2021-05-26 PROCEDURE — 71260 CT THORAX DX C+: CPT

## 2021-05-26 PROCEDURE — 85730 THROMBOPLASTIN TIME PARTIAL: CPT | Performed by: EMERGENCY MEDICINE

## 2021-05-26 PROCEDURE — 87040 BLOOD CULTURE FOR BACTERIA: CPT | Performed by: EMERGENCY MEDICINE

## 2021-05-26 PROCEDURE — 96361 HYDRATE IV INFUSION ADD-ON: CPT

## 2021-05-26 PROCEDURE — 85025 COMPLETE CBC W/AUTO DIFF WBC: CPT | Performed by: EMERGENCY MEDICINE

## 2021-05-26 PROCEDURE — 80053 COMPREHEN METABOLIC PANEL: CPT | Performed by: EMERGENCY MEDICINE

## 2021-05-26 PROCEDURE — G1004 CDSM NDSC: HCPCS

## 2021-05-26 PROCEDURE — 96374 THER/PROPH/DIAG INJ IV PUSH: CPT

## 2021-05-26 RX ORDER — DOXYCYCLINE HYCLATE 100 MG/1
100 CAPSULE ORAL ONCE
Status: COMPLETED | OUTPATIENT
Start: 2021-05-26 | End: 2021-05-26

## 2021-05-26 RX ORDER — ONDANSETRON 2 MG/ML
4 INJECTION INTRAMUSCULAR; INTRAVENOUS ONCE
Status: COMPLETED | OUTPATIENT
Start: 2021-05-26 | End: 2021-05-26

## 2021-05-26 RX ORDER — AMOXICILLIN AND CLAVULANATE POTASSIUM 875; 125 MG/1; MG/1
1 TABLET, FILM COATED ORAL ONCE
Status: COMPLETED | OUTPATIENT
Start: 2021-05-26 | End: 2021-05-26

## 2021-05-26 RX ORDER — AMOXICILLIN AND CLAVULANATE POTASSIUM 875; 125 MG/1; MG/1
1 TABLET, FILM COATED ORAL 2 TIMES DAILY
Qty: 20 TABLET | Refills: 0 | Status: SHIPPED | OUTPATIENT
Start: 2021-05-26 | End: 2021-06-05

## 2021-05-26 RX ORDER — DOXYCYCLINE HYCLATE 100 MG
100 TABLET ORAL 2 TIMES DAILY
Qty: 20 TABLET | Refills: 0 | Status: SHIPPED | OUTPATIENT
Start: 2021-05-26 | End: 2021-06-05

## 2021-05-26 RX ORDER — OXYCODONE HYDROCHLORIDE AND ACETAMINOPHEN 5; 325 MG/1; MG/1
1 TABLET ORAL ONCE
Status: COMPLETED | OUTPATIENT
Start: 2021-05-26 | End: 2021-05-26

## 2021-05-26 RX ADMIN — ONDANSETRON 4 MG: 2 INJECTION INTRAMUSCULAR; INTRAVENOUS at 00:52

## 2021-05-26 RX ADMIN — DOXYCYCLINE 100 MG: 100 CAPSULE ORAL at 04:21

## 2021-05-26 RX ADMIN — SODIUM CHLORIDE 1000 ML: 0.9 INJECTION, SOLUTION INTRAVENOUS at 00:09

## 2021-05-26 RX ADMIN — MORPHINE SULFATE 2 MG: 2 INJECTION, SOLUTION INTRAMUSCULAR; INTRAVENOUS at 00:52

## 2021-05-26 RX ADMIN — IOHEXOL 85 ML: 350 INJECTION, SOLUTION INTRAVENOUS at 01:32

## 2021-05-26 RX ADMIN — OXYCODONE HYDROCHLORIDE AND ACETAMINOPHEN 1 TABLET: 5; 325 TABLET ORAL at 04:19

## 2021-05-26 RX ADMIN — AMOXICILLIN AND CLAVULANATE POTASSIUM 1 TABLET: 875; 125 TABLET, FILM COATED ORAL at 04:21

## 2021-05-26 NOTE — ED PROVIDER NOTES
History  Chief Complaint   Patient presents with    Neck Pain     Pt reports "messing up again and my neck really hurts" States was seen recently for the same, but did not follow up for rehab  Last used cocaine yesterday, shot up into her neck  Patient is a 31year old female with neck pain and swelling and injected cocaine into her neck  (+) IVDA  (+) cough with green sputum now  Denies fever  No N/V  Was last seen in this ED on 5/20/21 for neck swelling  Last Tdap was in 2018 as per [facility] website checked on this patient and last Rxs filled were on 5/22/21 for clonazepam for 3 day supply and oxycodone for 2 day supply  States she found the cocaine in her car and she does not have anymore  Patient is on eliquis  History provided by:  Patient   used: No    Neck Pain  Associated symptoms: no fever        Prior to Admission Medications   Prescriptions Last Dose Informant Patient Reported? Taking? ARIPiprazole (ABILIFY) 10 mg tablet Past Week at Unknown time  Yes Yes   Sig: Take 10 mg by mouth daily Dosage unknown   apixaban (ELIQUIS) 5 mg 5/25/2021 at Unknown time  No Yes   Sig: Take 2 tablets (10 mg total) by mouth 2 (two) times a day for 7 days, THEN 1 tablet (5 mg total) 2 (two) times a day for 23 days     clonazePAM (KlonoPIN) 0 5 mg tablet Past Week at Unknown time  No Yes   Sig: Take 1 tablet (0 5 mg total) by mouth 2 (two) times a day for 3 days   gabapentin (NEURONTIN) 300 mg capsule Past Week at Unknown time  No Yes   Sig: Take 1 capsule (300 mg total) by mouth 2 (two) times a day for 7 days   prazosin (MINIPRESS) 1 mg capsule 5/25/2021 at Unknown time  No Yes   Sig: Take 1 capsule (1 mg total) by mouth daily at bedtime   sulfamethoxazole-trimethoprim (BACTRIM DS) 800-160 mg per tablet 5/25/2021 at Unknown time  No Yes   Sig: Take 1 tablet by mouth every 12 (twelve) hours for 4 days      Facility-Administered Medications: None       Past Medical History:   Diagnosis Date    Abnormal Pap smear of cervix     Anxiety     Depression     Endocarditis     2018    Hepatitis C     HPV (human papilloma virus) anogenital infection        Past Surgical History:   Procedure Laterality Date    IR PICC PLACEMENT DOUBLE LUMEN  2020    KNEE SURGERY Left     MOUTH SURGERY      MI REPLACE TRICUSPID W CP BYPASS N/A 9/10/2020    Procedure: REPAIR VALVE TRICUSPID with Medtronic 30mm 3D Contour  Annuloplasty Ring;  Surgeon: Jono Arguello MD;  [facility];  Service: Cardiac Surgery    TOOTH EXTRACTION N/A 2020    Procedure: EXTRACTION TOOTH 32;  Surgeon: Fredo Arias DMD;  Location: [facility];  Service: Maxillofacial       History reviewed  No pertinent family history  I have reviewed and agree with the history as documented  E-Cigarette/Vaping    E-Cigarette Use Current Every Day User      E-Cigarette/Vaping Substances    Nicotine Yes      Social History     Tobacco Use    Smoking status: Current Every Day Smoker     Packs/day: 0 50     Types: Cigarettes     Last attempt to quit: 2016     Years since quittin 5    Smokeless tobacco: Never Used   Substance Use Topics    Alcohol use: Not Currently     Alcohol/week: 0 0 standard drinks     Frequency: Monthly or less     Binge frequency: Never    Drug use: Yes     Types: Heroin, Marijuana, Benzodiazepines, Cocaine, Methamphetamines       Review of Systems   Constitutional: Negative for fever  Respiratory: Positive for cough  Musculoskeletal: Positive for neck pain (and swelling)  All other systems reviewed and are negative  Physical Exam  Physical Exam  Vitals signs and nursing note reviewed  Constitutional:       General: She is in acute distress (mild)  Appearance: She is not toxic-appearing  HENT:      Head: Normocephalic and atraumatic  Mouth/Throat:      Mouth: Mucous membranes are moist       Pharynx: No posterior oropharyngeal erythema     Eyes:      General: No scleral icterus  Neck:      Musculoskeletal: Normal range of motion and neck supple  No neck rigidity or muscular tenderness  Comments: Mild swelling noted bilaterally  Cardiovascular:      Rate and Rhythm: Normal rate and regular rhythm  Heart sounds: Normal heart sounds  No murmur  Pulmonary:      Effort: Pulmonary effort is normal  No respiratory distress  Breath sounds: Normal breath sounds  No stridor  No wheezing, rhonchi or rales  Abdominal:      General: Bowel sounds are normal       Palpations: Abdomen is soft  Tenderness: There is no abdominal tenderness  Musculoskeletal:         General: No deformity  Right lower leg: No edema  Left lower leg: No edema  Skin:     General: Skin is warm and dry  Findings: Bruising (of neck) present  No erythema or rash  Neurological:      General: No focal deficit present  Mental Status: She is alert and oriented to person, place, and time     Psychiatric:         Mood and Affect: Mood normal          Vital Signs  ED Triage Vitals [05/25/21 2309]   Temperature Pulse Respirations Blood Pressure SpO2   98 1 °F (36 7 °C) 88 20 109/69 99 %      Temp Source Heart Rate Source Patient Position - Orthostatic VS BP Location FiO2 (%)   Oral Monitor Sitting Right arm --      Pain Score       Worst Possible Pain           Vitals:    05/25/21 2309 05/26/21 0054 05/26/21 0145   BP: 109/69 109/59    Pulse: 88 (!) 110 70   Patient Position - Orthostatic VS: Sitting Sitting          Visual Acuity      ED Medications  Medications   sodium chloride 0 9 % infusion (has no administration in time range)   oxyCODONE-acetaminophen (PERCOCET) 5-325 mg per tablet 1 tablet (has no administration in time range)   amoxicillin-clavulanate (AUGMENTIN) 875-125 mg per tablet 1 tablet (has no administration in time range)   doxycycline hyclate (VIBRAMYCIN) capsule 100 mg (has no administration in time range)   sodium chloride 0 9 % bolus 1,000 mL (1,000 mL Intravenous New Bag 5/26/21 0009)   morphine injection 2 mg (2 mg Intravenous Given 5/26/21 0052)   ondansetron (ZOFRAN) injection 4 mg (4 mg Intravenous Given 5/26/21 0052)   iohexol (OMNIPAQUE) 350 MG/ML injection (SINGLE-DOSE) 85 mL (85 mL Intravenous Given 5/26/21 0132)       Diagnostic Studies  Results Reviewed     Procedure Component Value Units Date/Time    Protime-INR [[id number]]  (Normal) Collected: 05/26/21 0010    Lab Status: Final result Specimen: Blood from Arm, Right Updated: 05/26/21 0201     Protime 14 0 seconds      INR 1 07    APTT [[id number]]  (Normal) Collected: 05/26/21 0010    Lab Status: Final result Specimen: Blood from Arm, Right Updated: 05/26/21 0201     PTT 32 seconds     High sensitivity CRP [[id number]] Collected: 05/26/21 0010    Lab Status: Final result Specimen: Blood from Arm, Right Updated: 05/26/21 0126     CRP, High Sensitivity 42 83 mg/L     Narrative:            HsCRP Level       Relative Risk           <1 0 mg/L          Low           1 0 to 3 0 mg/L    Average           >3 0 mg/L          High        hCG, qualitative pregnancy [[id number]]  (Normal) Collected: 05/26/21 0010    Lab Status: Final result Specimen: Blood from Arm, Right Updated: 05/26/21 0126     Preg, Serum Negative    Lactic acid [[id number]]  (Normal) Collected: 05/26/21 0010    Lab Status: Final result Specimen: Blood from Arm, Right Updated: 05/26/21 0047     LACTIC ACID 1 0 mmol/L     Narrative:      Result may be elevated if tourniquet was used during collection      Comprehensive metabolic panel [[id number]]  (Abnormal) Collected: 05/26/21 0010    Lab Status: Final result Specimen: Blood from Arm, Right Updated: 05/26/21 0046     Sodium 136 mmol/L      Potassium 5 0 mmol/L      Chloride 101 mmol/L      CO2 25 mmol/L      ANION GAP 10 mmol/L      BUN 22 mg/dL      Creatinine 0 75 mg/dL      Glucose 87 mg/dL      Calcium 8 9 mg/dL      AST 55 U/L      ALT 41 U/L      Alkaline Phosphatase 90 U/L      Total Protein 8 3 g/dL      Albumin 4 1 g/dL      Total Bilirubin 0 55 mg/dL      eGFR 109 ml/min/1 73sq m     Narrative:      [place] guidelines for Chronic Kidney Disease (CKD):     Stage 1 with normal or high GFR (GFR > 90 mL/min/1 73 square meters)    Stage 2 Mild CKD (GFR = 60-89 mL/min/1 73 square meters)    Stage 3A Moderate CKD (GFR = 45-59 mL/min/1 73 square meters)    Stage 3B Moderate CKD (GFR = 30-44 mL/min/1 73 square meters)    Stage 4 Severe CKD (GFR = 15-29 mL/min/1 73 square meters)    Stage 5 End Stage CKD (GFR <15 mL/min/1 73 square meters)  Note: GFR calculation is accurate only with a steady state creatinine    Blood culture #1 [[id number]] Collected: 05/26/21 0020    Lab Status: In process Specimen: Blood from Arm, Left Updated: 05/26/21 0029    CBC and differential [[id number]]  (Abnormal) Collected: 05/26/21 0010    Lab Status: Final result Specimen: Blood from Arm, Right Updated: 05/26/21 0027     WBC 9 52 Thousand/uL      RBC 4 40 Million/uL      Hemoglobin 12 6 g/dL      Hematocrit 38 7 %      MCV 88 fL      MCH 28 6 pg      MCHC 32 6 g/dL      RDW 13 2 %      MPV 8 7 fL      Platelets 839 Thousands/uL      nRBC 0 /100 WBCs      Neutrophils Relative 59 %      Immat GRANS % 0 %      Lymphocytes Relative 29 %      Monocytes Relative 7 %      Eosinophils Relative 5 %      Basophils Relative 0 %      Neutrophils Absolute 5 52 Thousands/µL      Immature Grans Absolute 0 04 Thousand/uL      Lymphocytes Absolute 2 78 Thousands/µL      Monocytes Absolute 0 70 Thousand/µL      Eosinophils Absolute 0 45 Thousand/µL      Basophils Absolute 0 03 Thousands/µL     Blood culture #2 [[id number]] Collected: 05/26/21 0010    Lab Status:  In process Specimen: Blood from Arm, Right Updated: 05/26/21 0020                 CT soft tissue neck w contrast   ED Interpretation by Blade Bolden MD (05/26 0352)   FINDINGS:     VISUALIZED BRAIN PARENCHYMA:  No acute intracranial pathology of the visualized brain parenchyma      VISUALIZED ORBITS AND PARANASAL SINUSES:  Mild mucosal thickening in the maxillary sinuses  Mastoid air cells are clear      NASAL CAVITY AND NASOPHARYNX:  Normal      SUPRAHYOID NECK:  Normal oral cavity, tongue base, tonsillar fossa and epiglottis      INFRAHYOID NECK:  Aryepiglottic folds and piriform sinuses are normal   Normal glottis and subglottic airway      THYROID GLAND:  Unremarkable      PAROTID AND SUBMANDIBULAR GLANDS:  Normal glands  A few foci of subcutaneous emphysema seen along the left submandibular gland anterior to the left sternocleidomastoid muscle  No discrete fluid collection      LYMPH NODES:  No pathologic or enlarged adenopathy      VASCULAR STRUCTURES:  Normal enhancement of the cervical vasculature      THORACIC INLET:  See CT of the chest performed concurrently  Stable retained needle in the left supraclavicular fossa (series 2, image 5   5)  No fluid collection or abscess  Resolved thickening of the right sternocleidomastoid muscle      BONY STRUCTURES: No acute fracture or destructive osseous lesion  Status post sternotomy      IMPRESSION:     A few foci of subcutaneous emphysema seen along the left submandibular gland anterior to the left sternocleidomastoid muscle may be in keeping with history of injection  Stable retained needle fragment in the left supraclavicular fossa  No fluid   collection or abscess      Workstation performed: [id number]         Final Result by Mohit Leavitt MD (05/26 7152)      A few foci of subcutaneous emphysema seen along the left submandibular gland anterior to the left sternocleidomastoid muscle may be in keeping with history of injection  Stable retained needle fragment in the left supraclavicular fossa  No fluid    collection or abscess        Workstation performed: [id number]         CT chest with contrast   ED Interpretation by Booker Hamilton MD (63/68 2287)   FINDINGS:     LUNGS: Previously seen nodular opacities in the right upper and middle lobe have decreased in extent  Mild left basilar atelectasis  No new focal consolidation  The central airways are patent      PLEURA:  Unremarkable      HEART/GREAT VESSELS:  Tricuspid valve replacement      MEDIASTINUM AND VLADIMIR:  Unremarkable      CHEST WALL AND LOWER NECK:   Unremarkable      VISUALIZED STRUCTURES IN THE UPPER ABDOMEN:  Unremarkable      OSSEOUS STRUCTURES:  No acute fracture or destructive osseous lesion  Stable needle fragment in the left supraclavicular fossa (series 5, image 6)     IMPRESSION:     Improving nodular opacities in the right lung      Workstation performed: [id number]      Final Result by Denisse Gannon MD (05/26 1700)      Improving nodular opacities in the right lung  Workstation performed: [id number]         XR chest 1 view portable   ED Interpretation by Arminda Sevilla MD (05/26 5916)   No acute disease read by me  Procedures  Procedures         ED Course  ED Course as of May 26 0415   Wed May 26, 2021   0402 Labs and CTs d/w patient  Abx po ordered  Patient states she has outpatient detox set up already and does not want to speak with HOST in AM                              Initial Sepsis Screening     Row Name 05/26/21 0216                Is the patient's history suggestive of a new or worsening infection? (!) Yes (Proceed)  -AO        Suspected source of infection  pneumonia;soft tissue  -AO        Are two or more of the following signs & symptoms of infection both present and new to the patient?   No  -AO        Indicate SIRS criteria  Tachycardia > 90 bpm  -AO        If the answer is yes to both questions, suspicion of sepsis is present  --        If severe sepsis is present AND tissue hypoperfusion perists in the hour after fluid resuscitation or lactate > 4, the patient meets criteria for SEPTIC SHOCK  --        Are any of the following organ dysfunction criteria present within 6 hours of suspected infection and SIRS criteria that are NOT considered to be chronic conditions? --        Organ dysfunction  --        Date of presentation of severe sepsis  --        Time of presentation of severe sepsis  --        Tissue hypoperfusion persists in the hour after crystalloid fluid administration, evidenced, by either:  --        Was hypotension present within one hour of the conclusion of crystalloid fluid administration?  --        Date of presentation of septic shock  --        Time of presentation of septic shock  --          User Key  (r) = Recorded By, (t) = Taken By, (c) = Cosigned By    [address] Name Provider Cristina Frye MD Physician          SBIRT 22yo+      Most Recent Value   SBIRT (23 yo +)   In order to provide better care to our patients, we are screening all of our patients for alcohol and drug use  Would it be okay to ask you these screening questions? Unable to answer at this time Filed at: 05/25/2021 2311                    MDM  Number of Diagnoses or Management Options  Diagnosis management comments: DDx including but not limited to: lymphadenopathy, lymphoma, lymphadenitis, tumor, lipoma, SVC syndrome, abscess, thyroid etiology, cellulitis  DDx including but not limited to:  URI, bronchitis, pneumonia, GERD, aspiration pneumonitis, viral illness; doubt COVID 19, smoke inhalation, CO poisoning, adverse medication reaction  Doubt PE since patient is on eliquis            Amount and/or Complexity of Data Reviewed  Clinical lab tests: ordered and reviewed  Tests in the radiology section of CPT®: ordered and reviewed  Decide to obtain previous medical records or to obtain history from someone other than the patient: yes  Review and summarize past medical records: yes  Independent visualization of images, tracings, or specimens: yes        Disposition  Final diagnoses:   Pneumonia   Neck pain   Neck swelling   Substance abuse ([facility] [address] )     Time reflects when diagnosis was documented in both MDM as applicable and the Disposition within this note     Time User Action Codes Description Comment    5/26/2021  4:07 AM Arch Callahan Add [J18 9] Pneumonia     5/26/2021  4:07 AM Arch Callahan Add [M54 2] Neck pain     5/26/2021  4:07 AM Arch Callahan Add [R22 1] Neck swelling     5/26/2021  4:07 AM Arch Callahan Add [F19 10] Substance abuse [facility])       ED Disposition     ED Disposition Condition Date/Time Comment    Discharge Stable Wed May 26, 2021  4:06 AM Daniel Tobar discharge to home/self care  Follow-up Information     Follow up With Specialties Details Why Contact Info    Infolink  Call in 1 day Return sooner if increased pain, swelling, fever, vomiting, difficulty breathing  motrin/tylenol for pain  Stop injecting drugs  [phone]            Patient's Medications   Discharge Prescriptions    AMOXICILLIN-CLAVULANATE (AUGMENTIN) 875-125 MG PER TABLET    Take 1 tablet by mouth 2 (two) times a day for 10 days       Start Date: 5/26/2021 End Date: 6/5/2021       Order Dose: 1 tablet       Quantity: 20 tablet    Refills: 0    DOXYCYCLINE HYCLATE (VIBRA-TABS) 100 MG TABLET    Take 1 tablet (100 mg total) by mouth 2 (two) times a day for 10 days       Start Date: 5/26/2021 End Date: 6/5/2021       Order Dose: 100 mg       Quantity: 20 tablet    Refills: 0     No discharge procedures on file      PDMP Review       Value Time User    PDMP Reviewed  Yes 5/25/2021 11:18 PM Arminda Sevilla MD          ED Provider  Electronically Signed by           Arminda Sevilla MD  05/26/21 [address], [place]  05/26/21 9994

## 2021-05-31 LAB
BACTERIA BLD CULT: NORMAL
BACTERIA BLD CULT: NORMAL

## 2021-06-26 ENCOUNTER — HOSPITAL ENCOUNTER (OUTPATIENT)
Facility: HOSPITAL | Age: 31
Setting detail: OBSERVATION
Discharge: LEFT BEFORE DISCHARGE | End: 2021-06-26
Attending: EMERGENCY MEDICINE | Admitting: INTERNAL MEDICINE
Payer: COMMERCIAL

## 2021-06-26 ENCOUNTER — APPOINTMENT (EMERGENCY)
Dept: CT IMAGING | Facility: HOSPITAL | Age: 31
End: 2021-06-26
Payer: COMMERCIAL

## 2021-06-26 ENCOUNTER — APPOINTMENT (EMERGENCY)
Dept: ULTRASOUND IMAGING | Facility: HOSPITAL | Age: 31
End: 2021-06-26
Payer: COMMERCIAL

## 2021-06-26 VITALS
HEIGHT: 65 IN | BODY MASS INDEX: 22.49 KG/M2 | SYSTOLIC BLOOD PRESSURE: 97 MMHG | WEIGHT: 135 LBS | HEART RATE: 50 BPM | OXYGEN SATURATION: 100 % | TEMPERATURE: 97.7 F | RESPIRATION RATE: 16 BRPM | DIASTOLIC BLOOD PRESSURE: 47 MMHG

## 2021-06-26 DIAGNOSIS — Z34.90 PREGNANT: ICD-10-CM

## 2021-06-26 DIAGNOSIS — F19.10 POLYSUBSTANCE ABUSE (HCC): ICD-10-CM

## 2021-06-26 DIAGNOSIS — R41.82 ALTERED MENTAL STATUS: Primary | ICD-10-CM

## 2021-06-26 LAB
ALBUMIN SERPL BCP-MCNC: 3.6 G/DL (ref 3.5–5)
ALP SERPL-CCNC: 103 U/L (ref 46–116)
ALT SERPL W P-5'-P-CCNC: 61 U/L (ref 12–78)
ANION GAP SERPL CALCULATED.3IONS-SCNC: 11 MMOL/L (ref 4–13)
APAP SERPL-MCNC: <2 UG/ML (ref 10–20)
AST SERPL W P-5'-P-CCNC: 30 U/L (ref 5–45)
B-HCG SERPL-ACNC: ABNORMAL MIU/ML
BASOPHILS # BLD AUTO: 0.03 THOUSANDS/ΜL (ref 0–0.1)
BASOPHILS NFR BLD AUTO: 1 % (ref 0–1)
BILIRUB SERPL-MCNC: 0.2 MG/DL (ref 0.2–1)
BUN SERPL-MCNC: 16 MG/DL (ref 5–25)
CALCIUM SERPL-MCNC: 8.3 MG/DL (ref 8.3–10.1)
CHLORIDE SERPL-SCNC: 108 MMOL/L (ref 100–108)
CO2 SERPL-SCNC: 24 MMOL/L (ref 21–32)
CREAT SERPL-MCNC: 0.69 MG/DL (ref 0.6–1.3)
EOSINOPHIL # BLD AUTO: 0.28 THOUSAND/ΜL (ref 0–0.61)
EOSINOPHIL NFR BLD AUTO: 4 % (ref 0–6)
ERYTHROCYTE [DISTWIDTH] IN BLOOD BY AUTOMATED COUNT: 12.6 % (ref 11.6–15.1)
ETHANOL SERPL-MCNC: <3 MG/DL (ref 0–3)
GFR SERPL CREATININE-BSD FRML MDRD: 119 ML/MIN/1.73SQ M
GLUCOSE SERPL-MCNC: 81 MG/DL (ref 65–140)
GLUCOSE SERPL-MCNC: 98 MG/DL (ref 65–140)
HCG SERPL QL: POSITIVE
HCT VFR BLD AUTO: 35.1 % (ref 34.8–46.1)
HGB BLD-MCNC: 12 G/DL (ref 11.5–15.4)
IMM GRANULOCYTES # BLD AUTO: 0.02 THOUSAND/UL (ref 0–0.2)
IMM GRANULOCYTES NFR BLD AUTO: 0 % (ref 0–2)
LYMPHOCYTES # BLD AUTO: 3.43 THOUSANDS/ΜL (ref 0.6–4.47)
LYMPHOCYTES NFR BLD AUTO: 52 % (ref 14–44)
MCH RBC QN AUTO: 29.6 PG (ref 26.8–34.3)
MCHC RBC AUTO-ENTMCNC: 34.2 G/DL (ref 31.4–37.4)
MCV RBC AUTO: 87 FL (ref 82–98)
MONOCYTES # BLD AUTO: 0.45 THOUSAND/ΜL (ref 0.17–1.22)
MONOCYTES NFR BLD AUTO: 7 % (ref 4–12)
NEUTROPHILS # BLD AUTO: 2.33 THOUSANDS/ΜL (ref 1.85–7.62)
NEUTS SEG NFR BLD AUTO: 36 % (ref 43–75)
NRBC BLD AUTO-RTO: 0 /100 WBCS
PLATELET # BLD AUTO: 201 THOUSANDS/UL (ref 149–390)
PMV BLD AUTO: 8.8 FL (ref 8.9–12.7)
POTASSIUM SERPL-SCNC: 4 MMOL/L (ref 3.5–5.3)
PROT SERPL-MCNC: 6.6 G/DL (ref 6.4–8.2)
RBC # BLD AUTO: 4.06 MILLION/UL (ref 3.81–5.12)
SALICYLATES SERPL-MCNC: <3 MG/DL (ref 3–20)
SODIUM SERPL-SCNC: 143 MMOL/L (ref 136–145)
WBC # BLD AUTO: 6.54 THOUSAND/UL (ref 4.31–10.16)

## 2021-06-26 PROCEDURE — 80053 COMPREHEN METABOLIC PANEL: CPT | Performed by: EMERGENCY MEDICINE

## 2021-06-26 PROCEDURE — 84703 CHORIONIC GONADOTROPIN ASSAY: CPT | Performed by: EMERGENCY MEDICINE

## 2021-06-26 PROCEDURE — 99285 EMERGENCY DEPT VISIT HI MDM: CPT

## 2021-06-26 PROCEDURE — 96374 THER/PROPH/DIAG INJ IV PUSH: CPT

## 2021-06-26 PROCEDURE — 85025 COMPLETE CBC W/AUTO DIFF WBC: CPT | Performed by: EMERGENCY MEDICINE

## 2021-06-26 PROCEDURE — 99220 PR INITIAL OBSERVATION CARE/DAY 70 MINUTES: CPT | Performed by: INTERNAL MEDICINE

## 2021-06-26 PROCEDURE — 82077 ASSAY SPEC XCP UR&BREATH IA: CPT | Performed by: EMERGENCY MEDICINE

## 2021-06-26 PROCEDURE — G1004 CDSM NDSC: HCPCS

## 2021-06-26 PROCEDURE — 99285 EMERGENCY DEPT VISIT HI MDM: CPT | Performed by: EMERGENCY MEDICINE

## 2021-06-26 PROCEDURE — 36415 COLL VENOUS BLD VENIPUNCTURE: CPT | Performed by: EMERGENCY MEDICINE

## 2021-06-26 PROCEDURE — 84702 CHORIONIC GONADOTROPIN TEST: CPT | Performed by: EMERGENCY MEDICINE

## 2021-06-26 PROCEDURE — 96361 HYDRATE IV INFUSION ADD-ON: CPT

## 2021-06-26 PROCEDURE — 80143 DRUG ASSAY ACETAMINOPHEN: CPT | Performed by: EMERGENCY MEDICINE

## 2021-06-26 PROCEDURE — 80179 DRUG ASSAY SALICYLATE: CPT | Performed by: EMERGENCY MEDICINE

## 2021-06-26 PROCEDURE — 82948 REAGENT STRIP/BLOOD GLUCOSE: CPT

## 2021-06-26 PROCEDURE — 70450 CT HEAD/BRAIN W/O DYE: CPT

## 2021-06-26 PROCEDURE — 93005 ELECTROCARDIOGRAM TRACING: CPT

## 2021-06-26 RX ORDER — NALOXONE HYDROCHLORIDE 0.4 MG/ML
0.1 INJECTION, SOLUTION INTRAMUSCULAR; INTRAVENOUS; SUBCUTANEOUS ONCE
Status: COMPLETED | OUTPATIENT
Start: 2021-06-26 | End: 2021-06-26

## 2021-06-26 RX ORDER — PRAZOSIN HYDROCHLORIDE 1 MG/1
1 CAPSULE ORAL
Status: DISCONTINUED | OUTPATIENT
Start: 2021-06-26 | End: 2021-06-26 | Stop reason: HOSPADM

## 2021-06-26 RX ORDER — GABAPENTIN 300 MG/1
300 CAPSULE ORAL 2 TIMES DAILY
Status: DISCONTINUED | OUTPATIENT
Start: 2021-06-26 | End: 2021-06-26 | Stop reason: HOSPADM

## 2021-06-26 RX ORDER — SODIUM CHLORIDE 9 MG/ML
125 INJECTION, SOLUTION INTRAVENOUS CONTINUOUS
Status: DISCONTINUED | OUTPATIENT
Start: 2021-06-26 | End: 2021-06-26 | Stop reason: HOSPADM

## 2021-06-26 RX ADMIN — SODIUM CHLORIDE 1000 ML: 0.9 INJECTION, SOLUTION INTRAVENOUS at 11:15

## 2021-06-26 RX ADMIN — SODIUM CHLORIDE 125 ML/HR: 0.9 INJECTION, SOLUTION INTRAVENOUS at 12:42

## 2021-06-26 RX ADMIN — NALXONE HYDROCHLORIDE 0.1 MG: 0.4 INJECTION INTRAMUSCULAR; INTRAVENOUS; SUBCUTANEOUS at 06:55

## 2021-06-26 RX ADMIN — SODIUM CHLORIDE 1000 ML: 0.9 INJECTION, SOLUTION INTRAVENOUS at 06:58
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
## 2021-06-26 NOTE — PROGRESS NOTES
Pt came up from the ED agitated and yelling why is she still here and that she wants to leave  She was concerned if he her car was impounded or if she was getting a DUI  Notifed SLIM  MARUIM said they wanted to reevaluate her mental status and BP imroved after receiving IVF and eating  During lunch, pt was on the phone yelling and telling the person to come pick her up  She then continue to say that she only has 10 minutes and then she was leaving  I notified SLIM again  SLIM came into the room  Pt signed AMA papers
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
## 2021-06-26 NOTE — H&P
[address] 1990, 31 y o  female MRN: [id number]  Unit/Bed#: S -01 Encounter: [id number]  Primary Care Provider: No primary care provider on file  Date and time admitted to hospital: 6/26/2021  6:50 AM    * AMS (altered mental status)  Assessment & Plan  Patient is brought in for erratic driving by police   Found to be obtunded and not suitable to stay in their custody  Admitted to ED   ETOH negative  So far unable to get urine drug test      CT brain negative  She seeems to be waking up more on my assessment  Will not volunteer what she took  Asking for her cell phone so she can call her mother  Seems slightly confused, but likely improving  Will reassess later and discuss with CM regarding DC  ? Into custody or to self care   Pregnancy  Assessment & Plan  Discussed with patient  She is aware of pregnancy   Does not wish any prenatal care including US at present  Says she wants to terminate the pregnancy  VTE Prophylaxis: Heparin  / sequential compression device   Code Status: Full code  POLST: There is no POLST form on file for this patient (pre-hospital)  Discussion with family: Discussed with mother  Mother states she will not pick her up upon DC  Anticipated Length of Stay:  Patient will be admitted on an Observation basis with an anticipated length of stay of  Less than 2 midnights  Justification for Hospital Stay: AMS  Total Time for Visit, including Counseling / Coordination of Care: 45 minutes  Greater than 50% of this total time spent on direct patient counseling and coordination of care  Chief Complaint:     AMS  History of Present Illness:    Oneyda Ayon is a 31 y o  female who presents to the ED brought in by police for erratic driving  She has no complaints and is not in police custody at the time of my assessment       She is non compliant with most of history taking, asking for her phone and wallet and not answering questions appropriately  Looking back at records she has a history of polysubstance abuse and neck cellulitis from injecting into neck vein as well as TAVR following endocarditis  Currently she is non-toxic appearing with no acute medical issues  She has a diagnosis of bipolar and is on abilify , klonipin and gabapentin and says she has all her meds at home  Review of Systems:    Review of Systems   Unable to perform ROS: Mental status change   Skin: Negative  Past Medical and Surgical History:     Past Medical History:   Diagnosis Date    Abnormal Pap smear of cervix     Anxiety     Depression     Endocarditis     2018    Hepatitis C     HPV (human papilloma virus) anogenital infection        Past Surgical History:   Procedure Laterality Date    IR PICC PLACEMENT DOUBLE LUMEN  8/26/2020    KNEE SURGERY Left     MOUTH SURGERY      NH REPLACE TRICUSPID W CP BYPASS N/A 9/10/2020    Procedure: REPAIR VALVE TRICUSPID with Medtronic 30mm 3D Contour  Annuloplasty Ring;  Surgeon: Queenie Case MD;  Location: [facility];  Service: Cardiac Surgery    TOOTH EXTRACTION N/A 9/7/2020    Procedure: EXTRACTION TOOTH 28;  Surgeon: Alisa Stack DMD;  Location: [facility];  Service: Maxillofacial       Meds/Allergies:    Prior to Admission medications    Medication Sig Start Date End Date Taking? Authorizing Provider   apixaban (ELIQUIS) 5 mg Take 2 tablets (10 mg total) by mouth 2 (two) times a day for 7 days, THEN 1 tablet (5 mg total) 2 (two) times a day for 23 days   3/20/21 5/25/21  Gala Dorantes PA-C   ARIPiprazole (ABILIFY) 10 mg tablet Take 10 mg by mouth daily Dosage unknown    Historical Provider, MD   clonazePAM (KlonoPIN) 0 5 mg tablet Take 1 tablet (0 5 mg total) by mouth 2 (two) times a day for 3 days 5/22/21 5/25/21  Tanika Baez MD   gabapentin (NEURONTIN) 300 mg capsule Take 1 capsule (300 mg total) by mouth 2 (two) times a day for 7 days 21  Vishal Phillip MD   prazosin (MINIPRESS) 1 mg capsule Take 1 capsule (1 mg total) by mouth daily at bedtime 21  Vishal Phillip MD     I have reviewed home medications with patient personally  Allergies: Allergies   Allergen Reactions    Cat Hair Extract Itching    Dog Epithelium     Latex     Pollen Extract        Social History:     Marital Status: Single   Occupation: unemployed  Patient Pre-hospital Living Situation: lives with mother  Patient Pre-hospital Level of Mobility: fully   Patient Pre-hospital Diet Restrictions: none   Substance Use History:   Social History     Substance and Sexual Activity   Alcohol Use Not Currently    Alcohol/week: 0 0 standard drinks     Social History     Tobacco Use   Smoking Status Current Every Day Smoker    Packs/day: 0 50    Types: Cigarettes    Last attempt to quit: 2016    Years since quittin 6   Smokeless Tobacco Never Used     Social History     Substance and Sexual Activity   Drug Use Yes    Types: Heroin, Marijuana, Benzodiazepines, Cocaine, Methamphetamines       Family History:    No family history on file  Physical Exam:     Vitals:   Blood Pressure: (!) 97/47 (21 1156)  Pulse: (!) 50 (21 1156)  Temperature: 97 7 °F (36 5 °C) (21 1156)  Temp Source: Oral (21 1156)  Respirations: 16 (21 1156)  Height: 5' 5" (165 1 cm) (21 0700)  Weight - Scale: 61 2 kg (135 lb) (21 0700)  SpO2: 100 % (21 1156)    Physical Exam  Constitutional:       General: She is in acute distress  Appearance: She is not ill-appearing, toxic-appearing or diaphoretic  HENT:      Head: Normocephalic  Mouth/Throat:      Mouth: Mucous membranes are moist       Pharynx: No oropharyngeal exudate or posterior oropharyngeal erythema  Eyes:      Pupils: Pupils are equal, round, and reactive to light  Comments: Pupils 4 mm, responding to light      Cardiovascular:      Rate and Rhythm: Normal rate  Pulmonary:      Effort: No respiratory distress  Breath sounds: No wheezing or rales  Chest:      Chest wall: No tenderness  Abdominal:      General: Abdomen is flat  There is no distension  Palpations: There is no mass  Tenderness: There is no abdominal tenderness  There is no right CVA tenderness, left CVA tenderness, guarding or rebound  Hernia: No hernia is present  Skin:     Capillary Refill: Capillary refill takes less than 2 seconds  Coloration: Skin is pale  Skin is not jaundiced  Findings: No bruising or lesion  Neurological:      General: No focal deficit present  Mental Status: She is alert  Cranial Nerves: No cranial nerve deficit  Sensory: No sensory deficit  Motor: No weakness  Gait: Gait normal       Deep Tendon Reflexes: Reflexes normal    Psychiatric:      Comments: Intoxicated  However able to answer some questions  Knows she is pregnant  Not volunteering any information regarding what substance she has ingested  Knows where she is, oriented to place  Not time  Still exhibiting erratic behavior  Currently no SI, but would appear to be at risk of inadvertent self harm because of ongoing intoxication  She does however appear to be sobering up  Additional Data:     Lab Results: I have personally reviewed pertinent reports        Results from last 7 days   Lab Units 06/26/21  0657   WBC Thousand/uL 6 54   HEMOGLOBIN g/dL 12 0   HEMATOCRIT % 35 1   PLATELETS Thousands/uL 201   NEUTROS PCT % 36*   LYMPHS PCT % 52*   MONOS PCT % 7   EOS PCT % 4     Results from last 7 days   Lab Units 06/26/21  0657   SODIUM mmol/L 143   POTASSIUM mmol/L 4 0   CHLORIDE mmol/L 108   CO2 mmol/L 24   BUN mg/dL 16   CREATININE mg/dL 0 69   ANION GAP mmol/L 11   CALCIUM mg/dL 8 3   ALBUMIN g/dL 3 6   TOTAL BILIRUBIN mg/dL 0 20   ALK PHOS U/L 103   ALT U/L 61   AST U/L 30   GLUCOSE RANDOM mg/dL 98         Results from last 7 days   Lab Units 06/26/21  0659   POC GLUCOSE mg/dl 81               Imaging: I have personally reviewed pertinent reports  CT head without contrast   Final Result by Dallin Parsons MD (06/26 4275)      No acute intracranial hemorrhage seen   No extra-axial collection seen   No mass effect or midline shift seen                  Workstation performed: [id number]         US OB < 14 weeks with transvaginal    (Results Pending)       EKG, Pathology, and Other Studies Reviewed on Admission:   · EKG: no acute ischemia  NSR  Allscripts / Epic Records Reviewed: Yes     ** Please Note: This note has been constructed using a voice recognition system   **

## 2021-06-26 NOTE — ED NOTES
Patient transported to room 308 and receiving RN notified of arrival  Patient was starting to get agitated upon arrival, Charge RN informed and Charge RN speaking to patient     Asya Musa  06/26/21 5374

## 2021-06-26 NOTE — ED NOTES
Pt is still very drowsy and continuously asks "whats the plan?" pt educated on the plan of care at this point  Large sum of money was found on pt  A total of $145  Seven $20's and five $1 were counted, by this writer and Geronimo Gusman, FERNANDO  06/26/21 6159

## 2021-06-26 NOTE — ED PROVIDER NOTES
History  Chief Complaint   Patient presents with    Altered Mental Status     Arrives accompanied by PD for legal blood draw  Patient observed to be extremely lethargic, snoring respirations, difficult to arouse with verbal and painful stimuli  Unable to hold head upright or control secretions  ED provider requested to eval d/t concern for airway management and lethargy  Patient is a 53-year-old female with a history of substance abuse, bipolar disorder and chronic anticoagulation who presents with altered mental status  Patient was brought in by police  Police noted patient to be driving erratically and stopped her around 3:30 a m   Officer states that she failed the field sobriety test   She was brought to the emergency department for a blood draw and was noted to be increasingly drowsy  Therefore she was registered as an ED patient  Patient denies any alcohol or drug use this evening  She states that she is just very tired  She falls asleep during questioning and is arousable to voice  She has no complaints otherwise  History provided by:  Patient and police  Altered Mental Status  Presenting symptoms: lethargy    Most recent episode: Today  Episode history:  Continuous  Duration:  3 hours  Progression:  Worsening  Chronicity:  New  Associated symptoms: slurred speech    Associated symptoms: no abdominal pain and no vomiting        Prior to Admission Medications   Prescriptions Last Dose Informant Patient Reported? Taking? ARIPiprazole (ABILIFY) 10 mg tablet   Yes No   Sig: Take 10 mg by mouth daily Dosage unknown   apixaban (ELIQUIS) 5 mg   No No   Sig: Take 2 tablets (10 mg total) by mouth 2 (two) times a day for 7 days, THEN 1 tablet (5 mg total) 2 (two) times a day for 23 days     clonazePAM (KlonoPIN) 0 5 mg tablet   No No   Sig: Take 1 tablet (0 5 mg total) by mouth 2 (two) times a day for 3 days   gabapentin (NEURONTIN) 300 mg capsule   No No   Sig: Take 1 capsule (300 mg total) by mouth 2 (two) times a day for 7 days   prazosin (MINIPRESS) 1 mg capsule   No No   Sig: Take 1 capsule (1 mg total) by mouth daily at bedtime      Facility-Administered Medications: None       Past Medical History:   Diagnosis Date    Abnormal Pap smear of cervix     Anxiety     Depression     Endocarditis     2018    Hepatitis C     HPV (human papilloma virus) anogenital infection        Past Surgical History:   Procedure Laterality Date    IR PICC PLACEMENT DOUBLE LUMEN  2020    KNEE SURGERY Left     MOUTH SURGERY      AL REPLACE TRICUSPID W CP BYPASS N/A 9/10/2020    Procedure: REPAIR VALVE TRICUSPID with [employer]onic 30mm 3D Contour  Annuloplasty Ring;  Surgeon: Queenie Case MD;  [facility];  Service: Cardiac Surgery    TOOTH EXTRACTION N/A 2020    Procedure: EXTRACTION TOOTH 32;  Surgeon: Alisa Stack DMD;  Location: [facility];  Service: Maxillofacial       History reviewed  No pertinent family history  I have reviewed and agree with the history as documented  E-Cigarette/Vaping    E-Cigarette Use Current Every Day User      E-Cigarette/Vaping Substances    Nicotine Yes      Social History     Tobacco Use    Smoking status: Current Every Day Smoker     Packs/day: 0 50     Types: Cigarettes     Last attempt to quit: 2016     Years since quittin 6    Smokeless tobacco: Never Used   Vaping Use    Vaping Use: Every day    Substances: Nicotine   Substance Use Topics    Alcohol use: Not Currently     Alcohol/week: 0 0 standard drinks    Drug use: Yes     Types: Heroin, Marijuana, Benzodiazepines, Cocaine, Methamphetamines       Review of Systems   Unable to perform ROS: Mental status change   Gastrointestinal: Negative for abdominal pain and vomiting  Physical Exam  Physical Exam  Vitals and nursing note reviewed  Constitutional:       General: She is not in acute distress  Appearance: She is well-developed     HENT:      Head: Normocephalic and atraumatic  Eyes:      Pupils: Pupils are equal, round, and reactive to light  Comments: Pupils 5 mm round and reactive bilaterally  Cardiovascular:      Rate and Rhythm: Normal rate and regular rhythm  Pulses: Normal pulses  Heart sounds: Normal heart sounds  Pulmonary:      Effort: Pulmonary effort is normal  No respiratory distress  Breath sounds: Normal breath sounds  Abdominal:      General: There is no distension  Palpations: Abdomen is soft  Abdomen is not rigid  Tenderness: There is no abdominal tenderness  There is no guarding or rebound  Musculoskeletal:         General: No tenderness  Normal range of motion  Cervical back: Normal range of motion and neck supple  Lymphadenopathy:      Cervical: No cervical adenopathy  Skin:     General: Skin is warm and dry  Capillary Refill: Capillary refill takes less than 2 seconds  Neurological:      GCS: GCS eye subscore is 3  GCS verbal subscore is 5  GCS motor subscore is 6  Cranial Nerves: No cranial nerve deficit  Sensory: No sensory deficit  Motor: Motor function is intact  Comments: Somnolent but arousable to voice  No focal deficits           Vital Signs  ED Triage Vitals   Temperature Pulse Respirations Blood Pressure SpO2   06/26/21 0653 06/26/21 0653 06/26/21 0653 06/26/21 0653 06/26/21 0653   97 9 °F (36 6 °C) 59 16 105/54 98 %      Temp Source Heart Rate Source Patient Position - Orthostatic VS BP Location FiO2 (%)   06/26/21 0653 06/26/21 0653 06/26/21 0653 06/26/21 0653 --   Oral Monitor Lying Right arm       Pain Score       06/26/21 1200       No Pain           Vitals:    06/26/21 1000 06/26/21 1045 06/26/21 1115 06/26/21 1156   BP: (!) 92/48 (!) 84/41 122/52 (!) 97/47   Pulse: (!) 54 58 60 (!) 50   Patient Position - Orthostatic VS:    Sitting         Visual Acuity  Visual Acuity      Most Recent Value   L Pupil Size (mm)  2   R Pupil Size (mm)  5          ED Medications  Medications   sodium chloride 0 9 % bolus 1,000 mL (0 mL Intravenous Stopped 6/26/21 1015)   naloxone [facility]) injection 0 1 mg (0 1 mg Intravenous Given 6/26/21 0655)   sodium chloride 0 9 % bolus 1,000 mL (1,000 mL Intravenous New Bag 6/26/21 1115)       Diagnostic Studies  Results Reviewed     Procedure Component Value Units Date/Time    hCG, quantitative [[id number]]  (Abnormal) Collected: 06/26/21 0657    Lab Status: Final result Specimen: Blood from Arm, Left Updated: 06/26/21 0947     HCG, Quant 20,765 mIU/mL     Narrative:       Expected Ranges:     Approximate               Approximate HCG  Gestation age          Concentration ( mIU/mL)  _____________          ______________________   Bessie Phillips                      HCG values  0 2-1                       5-50  1-2                           2-3                         100-5000  3-4                         500-42911  4-5                         1000-50154  5-6                         [id number]  6-8                         38440-604638  8-12                        86817-444250      hCG, qualitative pregnancy [[id number]]  (Abnormal) Collected: 06/26/21 0657    Lab Status: Final result Specimen: Blood from Arm, Left Updated: 06/26/21 0749     Preg, Serum Positive    Salicylate level [[id number]]  (Abnormal) Collected: 06/26/21 0657    Lab Status: Final result Specimen: Blood from Arm, Left Updated: 61/78/34 3785     Salicylate Lvl <3 mg/dL     Acetaminophen level-If concentration is detectable, please discuss with medical  on call   [[id number]]  (Abnormal) Collected: 06/26/21 0657    Lab Status: Final result Specimen: Blood from Arm, Left Updated: 06/26/21 0748     Acetaminophen Level <2 ug/mL     Comprehensive metabolic panel [[id number]] Collected: 06/26/21 0657    Lab Status: Final result Specimen: Blood from Arm, Left Updated: 06/26/21 0724     Sodium 143 mmol/L      Potassium 4 0 mmol/L      Chloride 108 mmol/L      CO2 24 mmol/L ANION GAP 11 mmol/L      BUN 16 mg/dL      Creatinine 0 69 mg/dL      Glucose 98 mg/dL      Calcium 8 3 mg/dL      AST 30 U/L      ALT 61 U/L      Alkaline Phosphatase 103 U/L      Total Protein 6 6 g/dL      Albumin 3 6 g/dL      Total Bilirubin 0 20 mg/dL      eGFR 119 ml/min/1 73sq m     Narrative:      National Kidney Disease Foundation guidelines for Chronic Kidney Disease (CKD):     Stage 1 with normal or high GFR (GFR > 90 mL/min/1 73 square meters)    Stage 2 Mild CKD (GFR = 60-89 mL/min/1 73 square meters)    Stage 3A Moderate CKD (GFR = 45-59 mL/min/1 73 square meters)    Stage 3B Moderate CKD (GFR = 30-44 mL/min/1 73 square meters)    Stage 4 Severe CKD (GFR = 15-29 mL/min/1 73 square meters)    Stage 5 End Stage CKD (GFR <15 mL/min/1 73 square meters)  Note: GFR calculation is accurate only with a steady state creatinine    Ethanol [[id number]]  (Normal) Collected: 06/26/21 0657    Lab Status: Final result Specimen: Blood from Arm, Left Updated: 06/26/21 0720     Ethanol Lvl <3 mg/dL     CBC and differential [[id number]]  (Abnormal) Collected: 06/26/21 0657    Lab Status: Final result Specimen: Blood from Arm, Left Updated: 06/26/21 0717     WBC 6 54 Thousand/uL      RBC 4 06 Million/uL      Hemoglobin 12 0 g/dL      Hematocrit 35 1 %      MCV 87 fL      MCH 29 6 pg      MCHC 34 2 g/dL      RDW 12 6 %      MPV 8 8 fL      Platelets 971 Thousands/uL      nRBC 0 /100 WBCs      Neutrophils Relative 36 %      Immat GRANS % 0 %      Lymphocytes Relative 52 %      Monocytes Relative 7 %      Eosinophils Relative 4 %      Basophils Relative 1 %      Neutrophils Absolute 2 33 Thousands/µL      Immature Grans Absolute 0 02 Thousand/uL      Lymphocytes Absolute 3 43 Thousands/µL      Monocytes Absolute 0 45 Thousand/µL      Eosinophils Absolute 0 28 Thousand/µL      Basophils Absolute 0 03 Thousands/µL     Fingerstick Glucose (POCT) [[id number]]  (Normal) Collected: 06/26/21 0659    Lab Status: Final result Updated: 06/26/21 0701     POC Glucose 81 mg/dl                  CT head without contrast   Final Result by Karrie Michaels MD (06/26 7399)      No acute intracranial hemorrhage seen   No extra-axial collection seen   No mass effect or midline shift seen                  Workstation performed: [id number]                    Procedures  ECG 12 Lead Documentation Only    Date/Time: 6/26/2021 7:12 AM  Performed by: Brendon Barbosa DO  Authorized by: Brendon Barbosa DO     ECG reviewed by me, the ED Provider: yes    Patient location:  ED  Previous ECG:     Previous ECG:  Compared to current    Similarity:  Changes noted    Comparison to cardiac monitor: Yes    Comments:      Normal sinus rhythm at a rate of 68 beats per minute  Normal axis  Normal intervals  T-wave inversions in leads V1 and V2  Sinus rhythm has replaced junctional rhythm  ED Course  ED Course as of Jun 27 0719   Sat Jun 26, 2021   0701 No change after administration of narcan      0800 Patient with positive pregnancy test   Will check quantitative  Bedside ultrasound did not reveal IUP  It also did not indicate any free fluid in the abdomen or pelvis  Will check pelvic ultrasound to rule out pelvic pathology related to early pregnancy  Patient does not recall her last period  However she had a negative pregnancy test on 05/26  Initial Sepsis Screening     Row Name 06/26/21 4024                Is the patient's history suggestive of a new or worsening infection? No  -EP        Suspected source of infection  --        Are two or more of the following signs & symptoms of infection both present and new to the patient?   No  -EP        Indicate SIRS criteria  --        If the answer is yes to both questions, suspicion of sepsis is present  --        If severe sepsis is present AND tissue hypoperfusion perists in the hour after fluid resuscitation or lactate > 4, the patient meets criteria for SEPTIC SHOCK --        Are any of the following organ dysfunction criteria present within 6 hours of suspected infection and SIRS criteria that are NOT considered to be chronic conditions? --        Organ dysfunction  --        Date of presentation of severe sepsis  --        Time of presentation of severe sepsis  --        Tissue hypoperfusion persists in the hour after crystalloid fluid administration, evidenced, by either:  --        Was hypotension present within one hour of the conclusion of crystalloid fluid administration?  --        Date of presentation of septic shock  --        Time of presentation of septic shock  --          User Key  (r) = Recorded By, (t) = Taken By, (c) = Cosigned By    [address] Name Provider Xin Christiansen MD Physician          SBIRT 20yo+      Most Recent Value   SBIRT (23 yo +)   In order to provide better care to our patients, we are screening all of our patients for alcohol and drug use  Would it be okay to ask you these screening questions? Unable to answer at this time Filed at: 06/26/2021 0712                    MDM  Number of Diagnoses or Management Options  Altered mental status: new and requires workup  Polysubstance abuse [facility]): new and requires workup  Pregnant  Diagnosis management comments: Patient presents with altered mental status  Patient was stopped by police because she was driving erratically  She is drowsy but arousable in the emergency department  She does not have clinically significant respiratory depression in the emergency department  She was given Narcan with no effect  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  Will continue IV fluids  Will check CT head given altered mental status and anticoagulation  Patient was found to be pregnant  This was discussed with patient but will need to be addressed once again when she is more alert  Patient signed out to Dr Yves Lesches  Disposition pending CT head and reassessment         Amount and/or Complexity of Data Reviewed  Clinical lab tests: ordered and reviewed  Tests in the medicine section of CPT®: ordered and reviewed  Review and summarize past medical records: yes  Discuss the patient with other providers: yes  Independent visualization of images, tracings, or specimens: yes    Risk of Complications, Morbidity, and/or Mortality  Presenting problems: high  Diagnostic procedures: high  Management options: moderate    Patient Progress  Patient progress: stable      Disposition  Final diagnoses:   Pregnant   Altered mental status   [facility]     Time reflects when diagnosis was documented in both MDM as applicable and the Disposition within this note     Time User Action Codes Description Comment    6/26/2021  9:19 AM [place] Bad A Add [Z34 90] Pregnant     6/26/2021 10:24 AM [place] Bad A Add [R41 82] Altered mental status     6/26/2021 10:25 AM [place] Bad A Add [F19 10] Polysubstance abuse ([facility] )     6/26/2021 10:25 AM [place] Bad A Modify [Z34 90] Pregnant     6/26/2021 10:25 AM [place] A Modify [R41 82] Altered mental status       ED Disposition     ED Disposition Condition Date/Time Comment    Admit Stable Sat Jun 26, 2021 10:24 AM Case was discussed with Dr Zaid De La Fuente and the patient's admission status was agreed to be Admission Status: observation status to the service of Dr Zaid De La Fuente           Follow-up Information    None         Discharge Medication List as of 6/26/2021  4:13 PM      CONTINUE these medications which have NOT CHANGED    Details   apixaban (ELIQUIS) 5 mg Multiple Dosages:Starting Sat 3/20/2021, Last dose on Fri 3/26/2021, THEN Starting Sat 3/27/2021, Last dose on Sun 4/18/2021Take 2 tablets (10 mg total) by mouth 2 (two) times a day for 7 days, THEN 1 tablet (5 mg total) 2 (two) times a day for 23 day s , Normal      ARIPiprazole (ABILIFY) 10 mg tablet Take 10 mg by mouth daily Dosage unknown, Historical Med clonazePAM (KlonoPIN) 0 5 mg tablet Take 1 tablet (0 5 mg total) by mouth 2 (two) times a day for 3 days, Starting Sat 5/22/2021, Until Tue 5/25/2021, Normal      gabapentin (NEURONTIN) 300 mg capsule Take 1 capsule (300 mg total) by mouth 2 (two) times a day for 7 days, Starting Sat 5/22/2021, Until Sat 5/29/2021, Normal      prazosin (MINIPRESS) 1 mg capsule Take 1 capsule (1 mg total) by mouth daily at bedtime, Starting Sat 5/22/2021, Until Mon 6/21/2021, Normal           No discharge procedures on file      PDMP Review       Value Time User    PDMP Reviewed  Yes 5/25/2021 11:18 PM Fay West MD          ED Provider  Electronically Signed by           Dionicio Gaffney DO  06/27/21 7817

## 2021-06-26 NOTE — ASSESSMENT & PLAN NOTE
Discussed with patient  She is aware of pregnancy   Does not wish any prenatal care including US at present  Says she wants to terminate the pregnancy

## 2021-06-26 NOTE — ED NOTES
Patient very drowsy and not able to decide if she wants belongings locked up, patient counted money at $ 145 cash, witness to count RN [employer], cash was seven $20 and five $1 bills as per patient     Bruna Zarco  06/26/21 4874

## 2021-06-26 NOTE — ED CARE HANDOFF
Emergency Department Sign Out Note        Sign out and transfer of care from Dr Jose Ingram  See Separate Emergency Department note  The patient, Gurpreet Quijano, was evaluated by the previous provider for altered mentation, transient hypotension after driving erratically  Workup Completed:  Labs and CT ordered  ED Course / Workup Pending (followup): Coma panel negative, CBC/chemistry unremarkable  Pregnancy test returned positive  CT pending  ED Course as of Jun 26 1550   Sat Jun 26, 2021   2986 Upon transfer of care it was identified that patient's qualitative pregnancy test was positive  Dr Jose Ingram & I spoke with her  She indicated that she knew she was pregnant as her breasts have been sore lately  She says that she does not know how far along she is in her pregnancy in is uncertain as to when her LMP was  Denies having OBGYN provider  When questioned if she had abdominal discomfort or bleeding she initially suggested yes but then denied both  She did not seem tender upon palpation of the abdomen  She was extremely groggy during examination and needed to be redirected numerous times to lay back and not pull off monitoring equipment  Decision made to continue with CT scan  Ultrasound was brought to the bedside  No free fluid appreciated  Very limited views of the uterus obtained  Although patient is not tender and without guarding given altered mentation & transient hypotension (much more likely related to substance abuse) ordering ultrasound to assess for any evidence of abnormal pregnancy  (HCG test negative 5/26/21) Patient answered affirmatively to use prescription medications  She indicated that she is taking Eliquis  Veracityof this information is uncertain as patient remains altered/sedated  8556 Patient very drowsy upon US tech approach  She is unable to vocalize clear responses/consent for ultrasound imaging at this time    Anticipate likely observation stay in the hospital with re-approach of this topic later  0915 Blood type 5/2021:  O negative  Testing is being repeated today  Patient would potentially be a candidate for RhoGAM   (unclear from history whether she has had bleeding)  She is unable to have full conversations/express understanding regarding this at this time  Will touch base with OB I& additionally reach out to [facility] for observation stay  6017 Patient unable to provide urine specimen  She rouses slightly more easily and now opens eyes part way  She did not answer when asked how she was doing and upon being informed that she would stay in the hospital for a bit sat forward, back and vocalized acceptance of this  OB resident Dr Milton Martinez indicated they would be able to see patient later when mentation is improved and she is able to consent to gyn evaluation  RhoGAM would be appropriate if she has had some bleeding  Anticoagulant would need to switch from Eliquis to either heparin or Lovenox  Case was discussed with Dr Agustina Byrd                           Procedures  MDM    Disposition  Final diagnoses:   Pregnant   Altered mental status   Polysubstance abuse ([facility] )     Time reflects when diagnosis was documented in both MDM as applicable and the Disposition within this note     Time User Action Codes Description Comment    6/26/2021  9:19 AM Johnanna Sears A Add [Z34 90] Pregnant     6/26/2021 10:24 AM Johnanna Sears A Add [R41 82] Altered mental status     6/26/2021 10:25 AM Johnanna Sears A Add [F19 10] Polysubstance abuse ([facility] )     6/26/2021 10:25 AM Johnanna Sears A Modify [Z34 90] Pregnant     6/26/2021 10:25 AM Johnanna Sears A Modify [R41 82] Altered mental status       ED Disposition     ED Disposition Condition Date/Time Comment    Admit Stable Sat Jun 26, 2021 10:24 AM Case was discussed with Dr Agustina Byrd and the patient's admission status was agreed to be Admission Status: observation status to the service of Dr Aries Rick   Follow-up Information    None       Current Discharge Medication List      CONTINUE these medications which have NOT CHANGED    Details   apixaban (ELIQUIS) 5 mg Take 2 tablets (10 mg total) by mouth 2 (two) times a day for 7 days, THEN 1 tablet (5 mg total) 2 (two) times a day for 23 days  Qty: 74 tablet, Refills: 0    Comments: Eliquis Starter Pack  Associated Diagnoses: DVT of axillary vein, acute left (HCC)      ARIPiprazole (ABILIFY) 10 mg tablet Take 10 mg by mouth daily Dosage unknown      clonazePAM (KlonoPIN) 0 5 mg tablet Take 1 tablet (0 5 mg total) by mouth 2 (two) times a day for 3 days  Qty: 6 tablet, Refills: 0    Associated Diagnoses: Bipolar 1 disorder (HCC)      gabapentin (NEURONTIN) 300 mg capsule Take 1 capsule (300 mg total) by mouth 2 (two) times a day for 7 days  Qty: 14 capsule, Refills: 0    Associated Diagnoses: Phlegmonous cellulitis      prazosin (MINIPRESS) 1 mg capsule Take 1 capsule (1 mg total) by mouth daily at bedtime  Qty: 30 capsule, Refills: 0    Associated Diagnoses: Bipolar 1 disorder ([facility] )           No discharge procedures on file         ED Provider  Electronically Signed by     Meyer Dandy, MD  06/26/21 7645

## 2021-06-26 NOTE — PLAN OF CARE
Problem: Potential for Falls  Goal: Patient will remain free of falls  Description: INTERVENTIONS:  - Educate patient/family on patient safety including physical limitations  - Instruct patient to call for assistance with activity   - Consult OT/PT to assist with strengthening/mobility   - Keep Call bell within reach  - Keep bed low and locked with side rails adjusted as appropriate  - Keep care items and personal belongings within reach  - Initiate and maintain comfort rounds  - Make Fall Risk Sign visible to staff  - Offer Toileting every  Hours, in advance of need  - Initiate/Maintain alarm  - Obtain necessary fall risk management equipment:   - Apply yellow socks and bracelet for high fall risk patients  - Consider moving patient to room near nurses station  Outcome: Progressing     Problem: NEUROSENSORY - ADULT  Goal: Achieves stable or improved neurological status  Description: INTERVENTIONS  - Monitor and report changes in neurological status  - Monitor vital signs such as temperature, blood pressure, glucose, and any other labs ordered   - Initiate measures to prevent increased intracranial pressure  - Monitor for seizure activity and implement precautions if appropriate      Outcome: Progressing  Goal: Remains free of injury related to seizures activity  Description: INTERVENTIONS  - Maintain airway, patient safety  and administer oxygen as ordered  - Monitor patient for seizure activity, document and report duration and description of seizure to physician/advanced practitioner  - If seizure occurs,  ensure patient safety during seizure  - Reorient patient post seizure  - Seizure pads on all 4 side rails  - Instruct patient/family to notify RN of any seizure activity including if an aura is experienced  - Instruct patient/family to call for assistance with activity based on nursing assessment  - Administer anti-seizure medications if ordered    Outcome: Progressing  Goal: Achieves maximal functionality and self care  Description: INTERVENTIONS  - Monitor swallowing and airway patency with patient fatigue and changes in neurological status  - Encourage and assist patient to increase activity and self care     - Encourage visually impaired, hearing impaired and aphasic patients to use assistive/communication devices  Outcome: Progressing     Problem: METABOLIC, FLUID AND ELECTROLYTES - ADULT  Goal: Electrolytes maintained within normal limits  Description: INTERVENTIONS:  - Monitor labs and assess patient for signs and symptoms of electrolyte imbalances  - Administer electrolyte replacement as ordered  - Monitor response to electrolyte replacements, including repeat lab results as appropriate  - Instruct patient on fluid and nutrition as appropriate  Outcome: Progressing  Goal: Fluid balance maintained  Description: INTERVENTIONS:  - Monitor labs   - Monitor I/O and WT  - Instruct patient on fluid and nutrition as appropriate  - Assess for signs & symptoms of volume excess or deficit  Outcome: Progressing  Goal: Glucose maintained within target range  Description: INTERVENTIONS:  - Monitor Blood Glucose as ordered  - Assess for signs and symptoms of hyperglycemia and hypoglycemia  - Administer ordered medications to maintain glucose within target range  - Assess nutritional intake and initiate nutrition service referral as needed  Outcome: Progressing

## 2021-06-27 LAB
ATRIAL RATE: 71 BPM
P AXIS: 63 DEGREES
PR INTERVAL: 152 MS
QRS AXIS: 87 DEGREES
QRSD INTERVAL: 76 MS
QT INTERVAL: 392 MS
QTC INTERVAL: 417 MS
T WAVE AXIS: 64 DEGREES
VENTRICULAR RATE: 68 BPM

## 2021-06-27 PROCEDURE — 93010 ELECTROCARDIOGRAM REPORT: CPT | Performed by: INTERNAL MEDICINE

## 2021-07-03 ENCOUNTER — HOSPITAL ENCOUNTER (EMERGENCY)
Facility: HOSPITAL | Age: 31
Discharge: LEFT BEFORE DISCHARGE | End: 2021-07-03
Attending: EMERGENCY MEDICINE
Payer: COMMERCIAL

## 2021-07-03 VITALS
OXYGEN SATURATION: 96 % | SYSTOLIC BLOOD PRESSURE: 164 MMHG | DIASTOLIC BLOOD PRESSURE: 80 MMHG | WEIGHT: 136 LBS | HEART RATE: 105 BPM | RESPIRATION RATE: 18 BRPM | TEMPERATURE: 97.6 F | BODY MASS INDEX: 22.63 KG/M2

## 2021-07-03 DIAGNOSIS — J02.9 SORE THROAT: ICD-10-CM

## 2021-07-03 DIAGNOSIS — F19.10 SUBSTANCE ABUSE (HCC): Primary | ICD-10-CM

## 2021-07-03 PROCEDURE — 99284 EMERGENCY DEPT VISIT MOD MDM: CPT

## 2021-07-03 PROCEDURE — 99282 EMERGENCY DEPT VISIT SF MDM: CPT | Performed by: PHYSICIAN ASSISTANT

## 2021-07-04 ENCOUNTER — HOSPITAL ENCOUNTER (EMERGENCY)
Facility: HOSPITAL | Age: 31
Discharge: HOME | End: 2021-07-04
Attending: EMERGENCY MEDICINE | Admitting: EMERGENCY MEDICINE
Payer: COMMERCIAL

## 2021-07-04 VITALS
OXYGEN SATURATION: 98 % | HEART RATE: 60 BPM | BODY MASS INDEX: 22.64 KG/M2 | WEIGHT: 136.02 LBS | SYSTOLIC BLOOD PRESSURE: 104 MMHG | DIASTOLIC BLOOD PRESSURE: 56 MMHG | RESPIRATION RATE: 16 BRPM

## 2021-07-04 DIAGNOSIS — F19.10 DRUG ABUSE (HCC): ICD-10-CM

## 2021-07-04 DIAGNOSIS — R41.82 ALTERED MENTAL STATUS: Primary | ICD-10-CM

## 2021-07-04 LAB
ANION GAP SERPL CALCULATED.3IONS-SCNC: 12 MMOL/L (ref 4–13)
APAP SERPL-MCNC: <2 UG/ML (ref 10–20)
ATRIAL RATE: 81 BPM
B-HCG SERPL-ACNC: 985 MIU/ML
BASOPHILS # BLD AUTO: 0.03 THOUSANDS/ΜL (ref 0–0.1)
BASOPHILS NFR BLD AUTO: 0 % (ref 0–1)
BUN SERPL-MCNC: 10 MG/DL (ref 5–25)
CALCIUM SERPL-MCNC: 8.1 MG/DL (ref 8.3–10.1)
CHLORIDE SERPL-SCNC: 102 MMOL/L (ref 100–108)
CK MB SERPL-MCNC: 1.2 % (ref 0–2.5)
CK MB SERPL-MCNC: 13.5 NG/ML (ref 0–5)
CK SERPL-CCNC: 1162 U/L (ref 26–192)
CO2 SERPL-SCNC: 23 MMOL/L (ref 21–32)
CREAT SERPL-MCNC: 0.67 MG/DL (ref 0.6–1.3)
EOSINOPHIL # BLD AUTO: 0.1 THOUSAND/ΜL (ref 0–0.61)
EOSINOPHIL NFR BLD AUTO: 1 % (ref 0–6)
ERYTHROCYTE [DISTWIDTH] IN BLOOD BY AUTOMATED COUNT: 12 % (ref 11.6–15.1)
ETHANOL SERPL-MCNC: <3 MG/DL (ref 0–3)
GFR SERPL CREATININE-BSD FRML MDRD: 120 ML/MIN/1.73SQ M
GLUCOSE SERPL-MCNC: 90 MG/DL (ref 65–140)
HCT VFR BLD AUTO: 31 % (ref 34.8–46.1)
HGB BLD-MCNC: 10.9 G/DL (ref 11.5–15.4)
IMM GRANULOCYTES # BLD AUTO: 0.07 THOUSAND/UL (ref 0–0.2)
IMM GRANULOCYTES NFR BLD AUTO: 1 % (ref 0–2)
LYMPHOCYTES # BLD AUTO: 1.21 THOUSANDS/ΜL (ref 0.6–4.47)
LYMPHOCYTES NFR BLD AUTO: 13 % (ref 14–44)
MCH RBC QN AUTO: 29 PG (ref 26.8–34.3)
MCHC RBC AUTO-ENTMCNC: 35.2 G/DL (ref 31.4–37.4)
MCV RBC AUTO: 82 FL (ref 82–98)
MONOCYTES # BLD AUTO: 1.02 THOUSAND/ΜL (ref 0.17–1.22)
MONOCYTES NFR BLD AUTO: 11 % (ref 4–12)
NEUTROPHILS # BLD AUTO: 7.27 THOUSANDS/ΜL (ref 1.85–7.62)
NEUTS SEG NFR BLD AUTO: 74 % (ref 43–75)
NRBC BLD AUTO-RTO: 0 /100 WBCS
P AXIS: 68 DEGREES
PLATELET # BLD AUTO: 216 THOUSANDS/UL (ref 149–390)
PMV BLD AUTO: 8.7 FL (ref 8.9–12.7)
POTASSIUM SERPL-SCNC: 3.1 MMOL/L (ref 3.5–5.3)
PR INTERVAL: 128 MS
QRS AXIS: 66 DEGREES
QRSD INTERVAL: 80 MS
QT INTERVAL: 366 MS
QTC INTERVAL: 425 MS
RBC # BLD AUTO: 3.76 MILLION/UL (ref 3.81–5.12)
SALICYLATES SERPL-MCNC: <3 MG/DL (ref 3–20)
SODIUM SERPL-SCNC: 137 MMOL/L (ref 136–145)
T WAVE AXIS: 54 DEGREES
VENTRICULAR RATE: 81 BPM
WBC # BLD AUTO: 9.7 THOUSAND/UL (ref 4.31–10.16)

## 2021-07-04 PROCEDURE — 99285 EMERGENCY DEPT VISIT HI MDM: CPT | Performed by: EMERGENCY MEDICINE

## 2021-07-04 PROCEDURE — 36415 COLL VENOUS BLD VENIPUNCTURE: CPT | Performed by: EMERGENCY MEDICINE

## 2021-07-04 PROCEDURE — 96374 THER/PROPH/DIAG INJ IV PUSH: CPT

## 2021-07-04 PROCEDURE — 93010 ELECTROCARDIOGRAM REPORT: CPT | Performed by: INTERNAL MEDICINE

## 2021-07-04 PROCEDURE — 93005 ELECTROCARDIOGRAM TRACING: CPT

## 2021-07-04 PROCEDURE — 80179 DRUG ASSAY SALICYLATE: CPT | Performed by: EMERGENCY MEDICINE

## 2021-07-04 PROCEDURE — 80143 DRUG ASSAY ACETAMINOPHEN: CPT | Performed by: EMERGENCY MEDICINE

## 2021-07-04 PROCEDURE — 96361 HYDRATE IV INFUSION ADD-ON: CPT

## 2021-07-04 PROCEDURE — 82077 ASSAY SPEC XCP UR&BREATH IA: CPT | Performed by: EMERGENCY MEDICINE

## 2021-07-04 PROCEDURE — 99285 EMERGENCY DEPT VISIT HI MDM: CPT

## 2021-07-04 PROCEDURE — 82553 CREATINE MB FRACTION: CPT | Performed by: EMERGENCY MEDICINE

## 2021-07-04 PROCEDURE — 85025 COMPLETE CBC W/AUTO DIFF WBC: CPT | Performed by: EMERGENCY MEDICINE

## 2021-07-04 PROCEDURE — 84702 CHORIONIC GONADOTROPIN TEST: CPT | Performed by: EMERGENCY MEDICINE

## 2021-07-04 PROCEDURE — 96372 THER/PROPH/DIAG INJ SC/IM: CPT

## 2021-07-04 PROCEDURE — 82550 ASSAY OF CK (CPK): CPT | Performed by: EMERGENCY MEDICINE

## 2021-07-04 PROCEDURE — 80048 BASIC METABOLIC PNL TOTAL CA: CPT | Performed by: EMERGENCY MEDICINE

## 2021-07-04 RX ORDER — OLANZAPINE 10 MG/1
10 INJECTION, POWDER, LYOPHILIZED, FOR SOLUTION INTRAMUSCULAR ONCE
Status: COMPLETED | OUTPATIENT
Start: 2021-07-04 | End: 2021-07-04

## 2021-07-04 RX ORDER — LORAZEPAM 2 MG/ML
2 INJECTION INTRAMUSCULAR ONCE
Status: COMPLETED | OUTPATIENT
Start: 2021-07-04 | End: 2021-07-04

## 2021-07-04 RX ADMIN — OLANZAPINE 10 MG: 10 INJECTION, POWDER, FOR SOLUTION INTRAMUSCULAR at 09:25

## 2021-07-04 RX ADMIN — SODIUM CHLORIDE 1000 ML: 0.9 INJECTION, SOLUTION INTRAVENOUS at 11:25

## 2021-07-04 RX ADMIN — SODIUM CHLORIDE 1000 ML: 0.9 INJECTION, SOLUTION INTRAVENOUS at 10:32

## 2021-07-04 RX ADMIN — LORAZEPAM 2 MG: 2 INJECTION INTRAMUSCULAR; INTRAVENOUS at 09:21

## 2021-07-04 NOTE — ED NOTES
Pt awake, yet drowsy  Allowing pt to sleep at this time to allow her to metabolize substances  Will continue to monitor  No evidence of distress at this time       Michel Crocker RN  07/04/21 3504

## 2021-07-04 NOTE — ED PROVIDER NOTES
History  Chief Complaint   Patient presents with    Altered Mental Status     per pt "she did cocaine she shot it she did a lot and she is freaking out "     HPI     70-year-old female presents emergency department for evaluation of uncontrollable movements  She has a history of prior drug abuse, injected cocaine and she feels may have been mixed with bath salts  This occurred last evening and since then has had increased anxiety, tremors and sensation of difficulty breathing  Prior to Admission Medications   Prescriptions Last Dose Informant Patient Reported? Taking? ARIPiprazole (ABILIFY) 10 mg tablet   Yes No   Sig: Take 10 mg by mouth daily Dosage unknown   apixaban (ELIQUIS) 5 mg   No No   Sig: Take 2 tablets (10 mg total) by mouth 2 (two) times a day for 7 days, THEN 1 tablet (5 mg total) 2 (two) times a day for 23 days     clonazePAM (KlonoPIN) 0 5 mg tablet   No No   Sig: Take 1 tablet (0 5 mg total) by mouth 2 (two) times a day for 3 days   gabapentin (NEURONTIN) 300 mg capsule   No No   Sig: Take 1 capsule (300 mg total) by mouth 2 (two) times a day for 7 days   prazosin (MINIPRESS) 1 mg capsule   No No   Sig: Take 1 capsule (1 mg total) by mouth daily at bedtime      Facility-Administered Medications: None       Past Medical History:   Diagnosis Date    Abnormal Pap smear of cervix     Anxiety     Depression     Endocarditis     2018    Hepatitis C     HPV (human papilloma virus) anogenital infection        Past Surgical History:   Procedure Laterality Date    IR PICC PLACEMENT DOUBLE LUMEN  8/26/2020    KNEE SURGERY Left     MOUTH SURGERY      IA REPLACE TRICUSPID W CP BYPASS N/A 9/10/2020    Procedure: REPAIR VALVE TRICUSPID with [employer]onic 30mm 3D Contour  Annuloplasty Ring;  Surgeon: Brielle Buchanan MD;  [facility];  Service: Cardiac Surgery    TOOTH EXTRACTION N/A 9/7/2020    Procedure: EXTRACTION TOOTH 32;  Surgeon: Deborah Roblero DMD;  Location: [facility]; Service: Maxillofacial       History reviewed  No pertinent family history  I have reviewed and agree with the history as documented  E-Cigarette/Vaping    E-Cigarette Use Current Every Day User      E-Cigarette/Vaping Substances    Nicotine Yes      Social History     Tobacco Use    Smoking status: Current Every Day Smoker     Packs/day: 0 50     Types: Cigarettes     Last attempt to quit: 2016     Years since quittin 6    Smokeless tobacco: Never Used   Vaping Use    Vaping Use: Every day    Substances: Nicotine   Substance Use Topics    Alcohol use: Not Currently     Alcohol/week: 0 0 standard drinks    Drug use: Yes     Types: Cocaine, Heroin     Comment: Heroin injected last night and cocaine today  Review of Systems   Unable to perform ROS: Mental status change   Psychiatric/Behavioral: Positive for agitation  Physical Exam  Physical Exam  Vitals and nursing note reviewed  Constitutional:       General: She is not in acute distress  Appearance: She is ill-appearing  Comments: Diffuse tremors an abnormal movements   HENT:      Head: Normocephalic and atraumatic  Eyes:      Pupils: Pupils are equal, round, and reactive to light  Neck:      Comments: No obvious swelling  Bruising to the right side of her neck  Cardiovascular:      Rate and Rhythm: Normal rate and regular rhythm  Heart sounds: Normal heart sounds  No murmur heard  Pulmonary:      Effort: Pulmonary effort is normal  No respiratory distress  Breath sounds: Normal breath sounds  No wheezing or rales  Comments: Clear breath sounds bilaterally  Abdominal:      General: Bowel sounds are normal  There is no distension  Palpations: Abdomen is soft  Tenderness: There is no abdominal tenderness  There is no guarding or rebound  Musculoskeletal:         General: No deformity        Comments: Uncontrollable extremity movements   Skin:     Capillary Refill: Capillary refill takes less than 2 seconds  Findings: No erythema or rash  Neurological:      Mental Status: She is alert  GCS: GCS eye subscore is 4  GCS verbal subscore is 5  GCS motor subscore is 6  Motor: No abnormal muscle tone  Comments: Alert, interactive, very anxious   Psychiatric:         Mood and Affect: Mood is anxious           Behavior: Behavior normal          Vital Signs  ED Triage Vitals   Temp Pulse Respirations Blood Pressure SpO2   -- 07/04/21 0911 07/04/21 0911 07/04/21 1030 07/04/21 0911    69 22 105/58 98 %      Temp Source Heart Rate Source Patient Position - Orthostatic VS BP Location FiO2 (%)   07/04/21 0911 07/04/21 0911 -- -- --   Oral Monitor         Pain Score       07/04/21 0911       No Pain           Vitals:    07/04/21 1100 07/04/21 1130 07/04/21 1230 07/04/21 1300   BP: 98/54 102/57 106/56 104/56   Pulse: 66 58 58 60         Visual Acuity      ED Medications  Medications   LORazepam (ATIVAN) injection 2 mg (2 mg Intravenous Given 7/4/21 0921)   sodium chloride 0 9 % bolus 1,000 mL (0 mL Intravenous Stopped 7/4/21 1125)   OLANZapine (ZyPREXA) IM injection 10 mg (10 mg Intramuscular Given 7/4/21 0925)   sodium chloride 0 9 % bolus 1,000 mL (0 mL Intravenous Stopped 7/4/21 1318)       Diagnostic Studies  Results Reviewed     Procedure Component Value Units Date/Time    CKMB [[id number]]  (Abnormal) Collected: 07/04/21 1008    Lab Status: Final result Specimen: Blood from Arm, Left Updated: 07/04/21 1104     CK-MB Index 1 2 %      CK-MB 13 5 ng/mL     CK (with reflex to MB) [[id number]]  (Abnormal) Collected: 07/04/21 1008    Lab Status: Final result Specimen: Blood from Arm, Left Updated: 07/04/21 1103     Total CK 1,162 U/L     Basic metabolic panel [[id number]]  (Abnormal) Collected: 07/04/21 1008    Lab Status: Final result Specimen: Blood from Arm, Left Updated: 07/04/21 1059     Sodium 137 mmol/L      Potassium 3 1 mmol/L      Chloride 102 mmol/L      CO2 23 mmol/L      ANION GAP 12 mmol/L      BUN 10 mg/dL      Creatinine 0 67 mg/dL      Glucose 90 mg/dL      Calcium 8 1 mg/dL      eGFR 120 ml/min/1 73sq m     Narrative:      [place] guidelines for Chronic Kidney Disease (CKD):     Stage 1 with normal or high GFR (GFR > 90 mL/min/1 73 square meters)    Stage 2 Mild CKD (GFR = 60-89 mL/min/1 73 square meters)    Stage 3A Moderate CKD (GFR = 45-59 mL/min/1 73 square meters)    Stage 3B Moderate CKD (GFR = 30-44 mL/min/1 73 square meters)    Stage 4 Severe CKD (GFR = 15-29 mL/min/1 73 square meters)    Stage 5 End Stage CKD (GFR <15 mL/min/1 73 square meters)  Note: GFR calculation is accurate only with a steady state creatinine    Salicylate level [[id number]]  (Abnormal) Collected: 07/04/21 1008    Lab Status: Final result Specimen: Blood from Arm, Left Updated: 20/54/75 3083     Salicylate Lvl <3 mg/dL     Acetaminophen level-If concentration is detectable, please discuss with medical  on call   [[id number]]  (Abnormal) Collected: 07/04/21 1008    Lab Status: Final result Specimen: Blood from Arm, Left Updated: 07/04/21 1059     Acetaminophen Level <2 ug/mL     hCG, quantitative [[id number]]  (Abnormal) Collected: 07/04/21 1008    Lab Status: Final result Specimen: Blood from Arm, Left Updated: 07/04/21 1050     HCG, Quant 985 mIU/mL     Narrative:       Expected Ranges:     Approximate               Approximate HCG  Gestation age          Concentration ( mIU/mL)  _____________          ______________________   Marybel Nathan                      HCG values  0 2-1                       5-50  1-2                           2-3                         100-5000  3-4                         500-85614  4-5                         1000-66800  5-6                         [id number]  6-8                         [id number]  8-12                        81449-547382      Ethanol [[id number]]  (Normal) Collected: 07/04/21 1008    Lab Status: Final result Specimen: Blood from Arm, Left Updated: 21 1041     Ethanol Lvl <3 mg/dL     CBC and differential [[id number]]  (Abnormal) Collected: 21 1007    Lab Status: Final result Specimen: Blood from Arm, Left Updated: 21 1018     WBC 9 70 Thousand/uL      RBC 3 76 Million/uL      Hemoglobin 10 9 g/dL      Hematocrit 31 0 %      MCV 82 fL      MCH 29 0 pg      MCHC 35 2 g/dL      RDW 12 0 %      MPV 8 7 fL      Platelets 667 Thousands/uL      nRBC 0 /100 WBCs      Neutrophils Relative 74 %      Immat GRANS % 1 %      Lymphocytes Relative 13 %      Monocytes Relative 11 %      Eosinophils Relative 1 %      Basophils Relative 0 %      Neutrophils Absolute 7 27 Thousands/µL      Immature Grans Absolute 0 07 Thousand/uL      Lymphocytes Absolute 1 21 Thousands/µL      Monocytes Absolute 1 02 Thousand/µL      Eosinophils Absolute 0 10 Thousand/µL      Basophils Absolute 0 03 Thousands/µL     Rapid drug screen, urine [[id number]]     Lab Status: No result Specimen: Urine                  No orders to display              Procedures  Procedures         ED Course                                           MDM  Number of Diagnoses or Management Options  Altered mental status: new and requires workup  Drug abuse [facility]): new and requires workup  Diagnosis management comments: Zyprexa and Ativan given for patient and staff safety due to excited delirium  Will place IV, check blood work, monitor patient closely following medication administration  Patient reports having an  last week, hCG trending downward  No additional emergent workup indicated    CK 1162  Will hydrate patient in ER, CK not elevated in range of rhabdomyolysis    Patient reassessed  She is wandering around the emergency department  She is somnolent but oriented to person, place, time, situation when awake  She is not suicidal or homicidal   Suspect symptoms related to recent drug ingestion as well as antipsychotic medications given ER    Will order to anyone for patient safety as she is currently on elopement risk  We will allow patient to eat when she is more awake  Will allow more time for clinical sobriety prior to disposition  1748:  Patient awake, alert, oriented  Continues to decline any interest at all in drug rehab  She is able to tolerate oral intake  No indication for involuntary psychiatric hold  She is to be discharged home with family  Patient called her mother who would not come to pick her up  Patient is alert, oriented, of sound mind, not intoxicated, no grounds for 302 and is thus stable for discharge to the emergency department  She was putting contact with HOST during her emergency department visit yesterday  She currently does not want to speak with crisis worker or HOST for drug rehab or detox and is thus stable for discharge from the emergency department  Amount and/or Complexity of Data Reviewed  Clinical lab tests: ordered and reviewed  Tests in the medicine section of CPT®: ordered and reviewed    Risk of Complications, Morbidity, and/or Mortality  Presenting problems: high  Diagnostic procedures: moderate  Management options: high    Patient Progress  Patient progress: improved      Disposition  Final diagnoses: Altered mental status   Drug abuse ([facility] [address] )     Time reflects when diagnosis was documented in both MDM as applicable and the Disposition within this note     Time User Action Codes Description Comment    7/4/2021  5:49 PM Kerrie Lance Add [R41 82] Altered mental status     7/4/2021  5:49 PM Kerrie Lance Add [F19 10] Drug abuse [facility])       ED Disposition     ED Disposition Condition Date/Time Comment    Discharge Stable Sun Jul 4, 2021  5:49  [address] discharge to home/self care              Follow-up Information     Follow up With Specialties Details Why Contact Info Additional Information    Your OBGYN  Schedule an appointment as soon as possible for a visit in 2 days Follow-up after recent elective       [address] [facility] Emergency Medicine Go to  If symptoms worsen [address] [facility] [id number] [place] [address] Emergency Department, [address], [place], [place], [address]    Your family doctor  Schedule an appointment as soon as possible for a visit in 2 days As needed, follow-up for chronic medical conditions            Patient's Medications   Discharge Prescriptions    No medications on file     No discharge procedures on file      PDMP Review       Value Time User    PDMP Reviewed  Yes 2021 11:18 PM Gracie Garcia MD          ED Provider  Electronically Signed by           Shaista Rosenberg MD  21 1751       Shaista Rosenberg MD  [address]

## 2021-07-04 NOTE — ED NOTES
After ativan and zyprexa, pt still having episodes of uncontrollable moving and sobbing  Pt is currently on cardiac monitor  VSS at this time  Pt was changed into a gowns and given the hospital undergarments per her request  Will attempt US guided IV  Dr Ray Prince made aware        Mervat Munroe, RN  07/04/21 9310

## 2021-07-04 NOTE — ED NOTES
Unable to obtain vitals, pt continues to pull hemodynamic monitoring off     Devika Swift RN  07/04/21 9995

## 2021-07-04 NOTE — ED PROVIDER NOTES
History  Chief Complaint   Patient presents with    Sore Throat     States throat is sore   Detox Evaluation     Pt relapsed yesterday on cocaine after being sober for 2 mos  Patient presents for an evaluation of sore throat, subjective fevers, and detox  Patient states her sore throat began two days ago and has been having chills since  States she is also smoking a lot of cigarettes  She will have also have intermittent R ear pain as well  Denies any chest pain, shortness of breath, abdominal pain, nausea, vomiting  Also stating she would like detox from cocaine, oxycodone, and heroin  Last used yesterday  Denies any SI/ HI  She states she received an elective  on   Prior to Admission Medications   Prescriptions Last Dose Informant Patient Reported? Taking? ARIPiprazole (ABILIFY) 10 mg tablet   Yes No   Sig: Take 10 mg by mouth daily Dosage unknown   apixaban (ELIQUIS) 5 mg   No No   Sig: Take 2 tablets (10 mg total) by mouth 2 (two) times a day for 7 days, THEN 1 tablet (5 mg total) 2 (two) times a day for 23 days     clonazePAM (KlonoPIN) 0 5 mg tablet   No No   Sig: Take 1 tablet (0 5 mg total) by mouth 2 (two) times a day for 3 days   gabapentin (NEURONTIN) 300 mg capsule   No No   Sig: Take 1 capsule (300 mg total) by mouth 2 (two) times a day for 7 days   prazosin (MINIPRESS) 1 mg capsule   No No   Sig: Take 1 capsule (1 mg total) by mouth daily at bedtime      Facility-Administered Medications: None       Past Medical History:   Diagnosis Date    Abnormal Pap smear of cervix     Anxiety     Depression     Endocarditis         Hepatitis C     HPV (human papilloma virus) anogenital infection        Past Surgical History:   Procedure Laterality Date    IR PICC PLACEMENT DOUBLE LUMEN  2020    KNEE SURGERY Left     MOUTH SURGERY      [place] REPLACE TRICUSPID W CP BYPASS N/A 9/10/2020    Procedure: REPAIR VALVE TRICUSPID with [employer] 30mm 3D Contour  Annuloplasty Jay;  Surgeon: Giuliana Valdivia MD;  [facility]: [facility];  Service: Cardiac Surgery    TOOTH EXTRACTION N/A 2020    Procedure: EXTRACTION TOOTH 32;  Surgeon: Nathaly Rodriguez DMD;  Location: [facility];  Service: Maxillofacial       History reviewed  No pertinent family history  I have reviewed and agree with the history as documented  E-Cigarette/Vaping    E-Cigarette Use Current Every Day User      E-Cigarette/Vaping Substances    Nicotine Yes      Social History     Tobacco Use    Smoking status: Current Every Day Smoker     Packs/day: 0 50     Types: Cigarettes     Last attempt to quit: 2016     Years since quittin 6    Smokeless tobacco: Never Used   Vaping Use    Vaping Use: Every day    Substances: Nicotine   Substance Use Topics    Alcohol use: Not Currently     Alcohol/week: 0 0 standard drinks    Drug use: Yes     Types: Cocaine, Heroin     Comment: Heroin injected last night and cocaine today  Review of Systems   Constitutional: Positive for chills  Negative for fever  HENT: Positive for ear pain and sore throat  Negative for congestion  Eyes: Negative for pain  Respiratory: Negative for cough and shortness of breath  Cardiovascular: Negative for chest pain  Gastrointestinal: Negative for abdominal pain, nausea and vomiting  Genitourinary: Negative for dysuria  Musculoskeletal: Negative for back pain  Skin: Negative for rash  Neurological: Negative for dizziness and numbness  Psychiatric/Behavioral: Negative for suicidal ideas  All other systems reviewed and are negative  Physical Exam  Physical Exam  Vitals reviewed  Constitutional:       Appearance: She is well-developed  HENT:      Head: Normocephalic and atraumatic  Right Ear: Tympanic membrane and external ear normal       Left Ear: Tympanic membrane and external ear normal       Nose: Nose normal  No congestion        Mouth/Throat:      Mouth: Mucous membranes are moist  Pharynx: Uvula midline  Posterior oropharyngeal erythema present  Tonsils: 1+ on the right  1+ on the left  Cardiovascular:      Rate and Rhythm: Normal rate and regular rhythm  Heart sounds: Normal heart sounds  Pulmonary:      Effort: Pulmonary effort is normal       Breath sounds: Normal breath sounds  Abdominal:      General: Bowel sounds are normal       Palpations: Abdomen is soft  Tenderness: There is no abdominal tenderness  Musculoskeletal:         General: Normal range of motion  Cervical back: Normal range of motion and neck supple  Skin:     General: Skin is warm and dry  Capillary Refill: Capillary refill takes less than 2 seconds  Neurological:      Mental Status: She is alert and oriented to person, place, and time  Psychiatric:         Behavior: Behavior normal          Vital Signs  ED Triage Vitals [07/03/21 2023]   Temperature Pulse Respirations Blood Pressure SpO2   97 6 °F (36 4 °C) 105 18 164/80 96 %      Temp Source Heart Rate Source Patient Position - Orthostatic VS BP Location FiO2 (%)   Tympanic Monitor Sitting Left arm --      Pain Score       --           Vitals:    07/03/21 2023   BP: 164/80   Pulse: 105   Patient Position - Orthostatic VS: Sitting         Visual Acuity      ED Medications  Medications - No data to display    Diagnostic Studies  Results Reviewed     Procedure Component Value Units Date/Time    Novel Coronavirus [facility] [[id number]]     Lab Status: No result Specimen: Nares from Nose     hCG, quantitative [[id number]]     Lab Status: No result Specimen: Blood     Ethanol [[id number]]     Lab Status: No result Specimen: Blood     Salicylate level [[id number]]     Lab Status: No result Specimen: Blood     Acetaminophen level-If concentration is detectable, please discuss with medical  on call   [[id number]]     Lab Status: No result Specimen: Blood     CBC and differential [[id number]]     Lab Status: No result Specimen: Blood     Comprehensive metabolic panel [[id number]]     Lab Status: No result Specimen: Blood     UA w Reflex to Microscopic w Reflex to Culture [[id number]]     Lab Status: No result Specimen: Urine, Clean Catch     Rapid drug screen, urine [[id number]]     Lab Status: No result Specimen: Urine                  No orders to display              Procedures  Procedures         ED Course  ED Course as of Jul 03 2106   Sat Jul 03, 2021 2050 Refusing COVID test  Patient now threatening to leave  "You're all judging me", "I'm going to go to [facility] in to assess with nursing      2057 Crisis spoke with patient  Patient apparently was flushing drugs in the toilet  Patient given HOST number  Shortly after crisis left room, patient eloped                                              MDM  Number of Diagnoses or Management Options  Sore throat  Substance abuse ([facility] )  Diagnosis management comments: Patient presenting for sore throat, chills x2 days  No swelling, fever, chest pain, shortness of breath, abdominal pain, nausea, vomiting  Also requesting detox from cocaine, heroin, oxycodone  Denies any SI/ HI  Patient became increasingly agitated accusing staff of calling police on her and that we're "judging" her  Crisis evaluated patient and provided her with HOST referral  Shortly after, patient eloped from emergency department   No lab work done or treatment given other than HOST referral       Disposition  Final diagnoses:   Substance abuse ([facility] 75 )   Sore throat     Time reflects when diagnosis was documented in both MDM as applicable and the Disposition within this note     Time User Action Codes Description Comment    7/3/2021  9:01 PM Stefania Daniel Add [F19 10] Substance abuse ([facility] )     7/3/2021  9:01 PM Stefania Daniel Add [J02 9] Sore throat       ED Disposition     ED Disposition Condition Date/Time Comment    Eloped  Sat Jul 3, 2021  9:01 PM       Follow-up Information    None         Patient's Medications Discharge Prescriptions    No medications on file     No discharge procedures on file      PDMP Review       Value Time User    PDMP Reviewed  Yes 5/25/2021 11:18 PM Fay West MD          ED Provider  Electronically Signed by           Michi Jones PA-C  07/03/21 9018

## 2021-07-11 ENCOUNTER — HOSPITAL ENCOUNTER (INPATIENT)
Facility: HOSPITAL | Age: 31
LOS: 6 days | Discharge: LEFT BEFORE DISCHARGE | DRG: 710 | End: 2021-07-18
Attending: EMERGENCY MEDICINE | Admitting: INTERNAL MEDICINE
Payer: COMMERCIAL

## 2021-07-11 ENCOUNTER — APPOINTMENT (EMERGENCY)
Dept: RADIOLOGY | Facility: HOSPITAL | Age: 31
DRG: 710 | End: 2021-07-11
Payer: COMMERCIAL

## 2021-07-11 DIAGNOSIS — D72.829 LEUKOCYTOSIS: ICD-10-CM

## 2021-07-11 DIAGNOSIS — R10.9 FLANK PAIN: ICD-10-CM

## 2021-07-11 DIAGNOSIS — I76 SEPTIC EMBOLISM (HCC): ICD-10-CM

## 2021-07-11 DIAGNOSIS — B95.62 MRSA BACTEREMIA: ICD-10-CM

## 2021-07-11 DIAGNOSIS — A41.9 SEPSIS (HCC): Primary | ICD-10-CM

## 2021-07-11 DIAGNOSIS — L02.91 ABSCESS: ICD-10-CM

## 2021-07-11 DIAGNOSIS — R01.1 MURMUR, CARDIAC: ICD-10-CM

## 2021-07-11 DIAGNOSIS — R78.81 MRSA BACTEREMIA: ICD-10-CM

## 2021-07-11 DIAGNOSIS — R91.8 GROUND GLASS OPACITY PRESENT ON IMAGING OF LUNG: ICD-10-CM

## 2021-07-11 DIAGNOSIS — F11.10 HEROIN ABUSE (HCC): ICD-10-CM

## 2021-07-11 DIAGNOSIS — F19.10 SUBSTANCE ABUSE (HCC): ICD-10-CM

## 2021-07-11 DIAGNOSIS — E87.6 HYPOKALEMIA: ICD-10-CM

## 2021-07-11 DIAGNOSIS — F19.10 POLYSUBSTANCE ABUSE (HCC): ICD-10-CM

## 2021-07-11 LAB
ALBUMIN SERPL BCP-MCNC: 3.1 G/DL (ref 3.5–5)
ALP SERPL-CCNC: 110 U/L (ref 46–116)
ALT SERPL W P-5'-P-CCNC: 56 U/L (ref 12–78)
ANION GAP SERPL CALCULATED.3IONS-SCNC: 7 MMOL/L (ref 4–13)
AST SERPL W P-5'-P-CCNC: 42 U/L (ref 5–45)
BASOPHILS # BLD AUTO: 0.02 THOUSANDS/ΜL (ref 0–0.1)
BASOPHILS NFR BLD AUTO: 0 % (ref 0–1)
BILIRUB SERPL-MCNC: 0.78 MG/DL (ref 0.2–1)
BUN SERPL-MCNC: 10 MG/DL (ref 5–25)
CALCIUM ALBUM COR SERPL-MCNC: 9.2 MG/DL (ref 8.3–10.1)
CALCIUM SERPL-MCNC: 8.5 MG/DL (ref 8.3–10.1)
CHLORIDE SERPL-SCNC: 100 MMOL/L (ref 100–108)
CO2 SERPL-SCNC: 27 MMOL/L (ref 21–32)
CREAT SERPL-MCNC: 0.52 MG/DL (ref 0.6–1.3)
EOSINOPHIL # BLD AUTO: 0.11 THOUSAND/ΜL (ref 0–0.61)
EOSINOPHIL NFR BLD AUTO: 1 % (ref 0–6)
ERYTHROCYTE [DISTWIDTH] IN BLOOD BY AUTOMATED COUNT: 12.9 % (ref 11.6–15.1)
GFR SERPL CREATININE-BSD FRML MDRD: 130 ML/MIN/1.73SQ M
GLUCOSE SERPL-MCNC: 92 MG/DL (ref 65–140)
HCT VFR BLD AUTO: 32.5 % (ref 34.8–46.1)
HGB BLD-MCNC: 11.1 G/DL (ref 11.5–15.4)
IMM GRANULOCYTES # BLD AUTO: 0.11 THOUSAND/UL (ref 0–0.2)
IMM GRANULOCYTES NFR BLD AUTO: 1 % (ref 0–2)
LACTATE SERPL-SCNC: 1.2 MMOL/L (ref 0.5–2)
LYMPHOCYTES # BLD AUTO: 1.83 THOUSANDS/ΜL (ref 0.6–4.47)
LYMPHOCYTES NFR BLD AUTO: 14 % (ref 14–44)
MCH RBC QN AUTO: 28.7 PG (ref 26.8–34.3)
MCHC RBC AUTO-ENTMCNC: 34.2 G/DL (ref 31.4–37.4)
MCV RBC AUTO: 84 FL (ref 82–98)
MONOCYTES # BLD AUTO: 1.48 THOUSAND/ΜL (ref 0.17–1.22)
MONOCYTES NFR BLD AUTO: 11 % (ref 4–12)
NEUTROPHILS # BLD AUTO: 9.52 THOUSANDS/ΜL (ref 1.85–7.62)
NEUTS SEG NFR BLD AUTO: 73 % (ref 43–75)
NRBC BLD AUTO-RTO: 0 /100 WBCS
PLATELET # BLD AUTO: 299 THOUSANDS/UL (ref 149–390)
PMV BLD AUTO: 9.1 FL (ref 8.9–12.7)
POTASSIUM SERPL-SCNC: 3 MMOL/L (ref 3.5–5.3)
PROT SERPL-MCNC: 7 G/DL (ref 6.4–8.2)
RBC # BLD AUTO: 3.87 MILLION/UL (ref 3.81–5.12)
SODIUM SERPL-SCNC: 134 MMOL/L (ref 136–145)
TROPONIN I SERPL-MCNC: <0.02 NG/ML
WBC # BLD AUTO: 13.07 THOUSAND/UL (ref 4.31–10.16)

## 2021-07-11 PROCEDURE — 99285 EMERGENCY DEPT VISIT HI MDM: CPT | Performed by: EMERGENCY MEDICINE

## 2021-07-11 PROCEDURE — 96375 TX/PRO/DX INJ NEW DRUG ADDON: CPT

## 2021-07-11 PROCEDURE — 96361 HYDRATE IV INFUSION ADD-ON: CPT

## 2021-07-11 PROCEDURE — 96372 THER/PROPH/DIAG INJ SC/IM: CPT

## 2021-07-11 PROCEDURE — 96374 THER/PROPH/DIAG INJ IV PUSH: CPT

## 2021-07-11 PROCEDURE — 87040 BLOOD CULTURE FOR BACTERIA: CPT | Performed by: EMERGENCY MEDICINE

## 2021-07-11 PROCEDURE — 80053 COMPREHEN METABOLIC PANEL: CPT | Performed by: EMERGENCY MEDICINE

## 2021-07-11 PROCEDURE — 99285 EMERGENCY DEPT VISIT HI MDM: CPT

## 2021-07-11 PROCEDURE — 84484 ASSAY OF TROPONIN QUANT: CPT | Performed by: EMERGENCY MEDICINE

## 2021-07-11 PROCEDURE — 36415 COLL VENOUS BLD VENIPUNCTURE: CPT | Performed by: EMERGENCY MEDICINE

## 2021-07-11 PROCEDURE — 83605 ASSAY OF LACTIC ACID: CPT | Performed by: EMERGENCY MEDICINE

## 2021-07-11 PROCEDURE — 85025 COMPLETE CBC W/AUTO DIFF WBC: CPT | Performed by: EMERGENCY MEDICINE

## 2021-07-11 PROCEDURE — 93005 ELECTROCARDIOGRAM TRACING: CPT

## 2021-07-11 PROCEDURE — 87186 SC STD MICRODIL/AGAR DIL: CPT | Performed by: EMERGENCY MEDICINE

## 2021-07-11 RX ORDER — KETOROLAC TROMETHAMINE 30 MG/ML
15 INJECTION, SOLUTION INTRAMUSCULAR; INTRAVENOUS ONCE
Status: COMPLETED | OUTPATIENT
Start: 2021-07-11 | End: 2021-07-11

## 2021-07-11 RX ORDER — MORPHINE SULFATE 4 MG/ML
4 INJECTION, SOLUTION INTRAMUSCULAR; INTRAVENOUS ONCE
Status: COMPLETED | OUTPATIENT
Start: 2021-07-11 | End: 2021-07-11

## 2021-07-11 RX ORDER — MIDAZOLAM HYDROCHLORIDE 2 MG/2ML
4 INJECTION, SOLUTION INTRAMUSCULAR; INTRAVENOUS ONCE
Status: COMPLETED | OUTPATIENT
Start: 2021-07-11 | End: 2021-07-11

## 2021-07-11 RX ORDER — HYDROMORPHONE HCL IN WATER/PF 6 MG/30 ML
0.2 PATIENT CONTROLLED ANALGESIA SYRINGE INTRAVENOUS ONCE
Status: COMPLETED | OUTPATIENT
Start: 2021-07-11 | End: 2021-07-11

## 2021-07-11 RX ORDER — POTASSIUM CHLORIDE 20 MEQ/1
40 TABLET, EXTENDED RELEASE ORAL ONCE
Status: DISCONTINUED | OUTPATIENT
Start: 2021-07-11 | End: 2021-07-12

## 2021-07-11 RX ORDER — KETAMINE HCL IN NACL, ISO-OSM 100MG/10ML
0.3 SYRINGE (ML) INJECTION ONCE
Status: COMPLETED | OUTPATIENT
Start: 2021-07-11 | End: 2021-07-11

## 2021-07-11 RX ADMIN — Medication 19 MG: at 22:04

## 2021-07-11 RX ADMIN — MORPHINE SULFATE 4 MG: 4 INJECTION INTRAVENOUS at 17:09

## 2021-07-11 RX ADMIN — KETOROLAC TROMETHAMINE 15 MG: 30 INJECTION, SOLUTION INTRAMUSCULAR at 16:18

## 2021-07-11 RX ADMIN — HYDROMORPHONE HYDROCHLORIDE 0.2 MG: 0.2 INJECTION, SOLUTION INTRAMUSCULAR; INTRAVENOUS; SUBCUTANEOUS at 19:16

## 2021-07-11 RX ADMIN — MIDAZOLAM 4 MG: 1 INJECTION INTRAMUSCULAR; INTRAVENOUS at 16:18

## 2021-07-11 RX ADMIN — SODIUM CHLORIDE 1000 ML: 0.9 INJECTION, SOLUTION INTRAVENOUS at 18:13

## 2021-07-11 NOTE — ED NOTES
Pt refusing CAT scan, also says she hasn't eaten or slept in a week      Nargis Casarez  07/11/21 1949 [id number]

## 2021-07-11 NOTE — ED PROVIDER NOTES
History  Chief Complaint   Patient presents with    Recreational Drug Use     Pt c/o severe right flank pain  Pt also c/o pain in hands and feet  Pt states "I am coming off a relapse"  Pt admits to "shooting up cocaine in my hands and feet and neck last night "       30F with past medical history of polysubstance abuse, endocarditis status post tricuspid valve repair, hepatitis-C, and bipolar disorder who presents to the emergency department with pain in the right flank, hands, and feet  Patient reports injecting cocaine into the hands, feet, and neck last night  Today she reports waking up with sudden severe right flank pain  She reports that she had an elective  2 weeks ago  She denied dysuria, vaginal bleeding, spinal pain, history of kidney stones  Upon presentation she was unable to stay still or sit down  Patient very agitated and unable to give a detailed history or remain still for detailed physical exam       History provided by:  Patient  History limited by: Patient agitation   used: No        Prior to Admission Medications   Prescriptions Last Dose Informant Patient Reported? Taking? ARIPiprazole (ABILIFY) 10 mg tablet Unknown at Unknown time  Yes No   Sig: Take 10 mg by mouth daily Dosage unknown   apixaban (ELIQUIS) 5 mg   No No   Sig: Take 2 tablets (10 mg total) by mouth 2 (two) times a day for 7 days, THEN 1 tablet (5 mg total) 2 (two) times a day for 23 days     clonazePAM (KlonoPIN) 0 5 mg tablet   No No   Sig: Take 1 tablet (0 5 mg total) by mouth 2 (two) times a day for 3 days   gabapentin (NEURONTIN) 300 mg capsule   No No   Sig: Take 1 capsule (300 mg total) by mouth 2 (two) times a day for 7 days   prazosin (MINIPRESS) 1 mg capsule   No No   Sig: Take 1 capsule (1 mg total) by mouth daily at bedtime      Facility-Administered Medications: None       Past Medical History:   Diagnosis Date    Abnormal Pap smear of cervix     Anxiety     Depression     Endocarditis     2018    Hepatitis C     HPV (human papilloma virus) anogenital infection        Past Surgical History:   Procedure Laterality Date    IR PICC PLACEMENT DOUBLE LUMEN  2020    KNEE SURGERY Left     MOUTH SURGERY      OK REPLACE TRICUSPID W CP BYPASS N/A 9/10/2020    Procedure: REPAIR VALVE TRICUSPID with [employer] 30mm 3D Contour  Annuloplasty Ring;  Surgeon: Albert Rios MD;  [facility]: [facility];  Service: Cardiac Surgery    TOOTH EXTRACTION N/A 2020    Procedure: EXTRACTION TOOTH 32;  Surgeon: Deborah Roblero DMD;  Location: [facility];  Service: Maxillofacial       History reviewed  No pertinent family history  I have reviewed and agree with the history as documented  E-Cigarette/Vaping    E-Cigarette Use Current Every Day User      E-Cigarette/Vaping Substances    Nicotine Yes      Social History     Tobacco Use    Smoking status: Current Every Day Smoker     Packs/day: 0 50     Types: Cigarettes     Last attempt to quit: 2016     Years since quittin 6    Smokeless tobacco: Never Used   Vaping Use    Vaping Use: Every day    Substances: Nicotine   Substance Use Topics    Alcohol use: Not Currently     Alcohol/week: 0 0 standard drinks    Drug use: Yes     Types: Cocaine, Heroin, Fentanyl     Comment: injected cocaine last night  Review of Systems   Constitutional: Positive for fatigue and fever  Cardiovascular: Negative for chest pain  Gastrointestinal: Positive for constipation  Negative for abdominal pain, diarrhea, nausea and vomiting  Genitourinary: Positive for flank pain (Right)  Musculoskeletal:        Pain on dorsal surface of both hands and feet   Skin: Positive for color change (Erythema on dorsal surface of both hands and feet)  Psychiatric/Behavioral: Positive for agitation         Physical Exam  ED Triage Vitals   Temperature Pulse Respirations Blood Pressure SpO2   21 1811 21 1551 21 1551 21 1551 07/11/21 1551   98 1 °F (36 7 °C) 89 (!) 30 (!) 151/118 97 %      Temp Source Heart Rate Source Patient Position - Orthostatic VS BP Location FiO2 (%)   07/11/21 1811 07/11/21 1551 07/11/21 1551 07/11/21 1551 --   Oral Monitor Standing Right arm       Pain Score       07/11/21 1551       Worst Possible Pain             Orthostatic Vital Signs  Vitals:    07/15/21 2126 07/16/21 0628 07/16/21 1559 07/16/21 2213   BP: 115/70 (!) 97/48 112/67 115/68   Pulse:   (!) 49    Patient Position - Orthostatic VS:           Physical Exam  Vitals and nursing note reviewed  Constitutional:       General: She is in acute distress (Agitated, moving around a lot due to pain)  HENT:      Head: Normocephalic and atraumatic  Nose:      Comments: Brief episode of spontaneous bleeding from nose  Cardiovascular:      Rate and Rhythm: Normal rate and regular rhythm  Heart sounds: Murmur (Systolic murmur along left sternal border and left 5th intercostal space) heard  Pulmonary:      Breath sounds: Normal breath sounds  No wheezing, rhonchi or rales  Comments: Brief episodes of tachypnea secondary to pain  Abdominal:      General: Bowel sounds are normal  There is no distension  Palpations: Abdomen is soft  Tenderness: There is no abdominal tenderness (No focal tenderness to palpation throughout abdomen  Sometimes patient reports pain in right flank when palpating at various points throughout abdomen  Right flank tenderness distractible)  There is no guarding  Musculoskeletal:         General: Tenderness (Tenderness over dorsum of both hands and both feet) present  Cervical back: Normal range of motion  Skin:     General: Skin is warm and dry  Findings: Erythema (Erythema over dorsum of both hands and both feet) present  Neurological:      General: No focal deficit present  Mental Status: She is alert  Psychiatric:         Behavior: Behavior is agitated           ED Medications  Medications   apixaban (ELIQUIS) tablet 5 mg (5 mg Oral Given 7/16/21 1742)   ARIPiprazole (ABILIFY) tablet 10 mg (10 mg Oral Given 7/16/21 0836)   clonazePAM (KlonoPIN) tablet 0 5 mg (0 5 mg Oral Given 7/16/21 1741)   prazosin (MINIPRESS) capsule 1 mg (1 mg Oral Refused 7/16/21 2131)   docusate sodium (COLACE) capsule 100 mg (has no administration in time range)   ondansetron (ZOFRAN) injection 4 mg (4 mg Intravenous Given 7/13/21 0805)   aluminum-magnesium hydroxide-simethicone (MYLANTA) oral suspension 30 mL ( Oral Canceled Entry 7/16/21 2131)   nicotine (NICODERM CQ) 21 mg/24 hr TD 24 hr patch 1 patch (1 patch Transdermal Medication Applied 7/16/21 0835)   acetaminophen (TYLENOL) tablet 975 mg (975 mg Oral Given 7/16/21 2129)   oxyCODONE (ROXICODONE) IR tablet 15 mg (15 mg Oral Given 7/16/21 2219)   oxyCODONE (ROXICODONE) immediate release tablet 10 mg ( Oral Canceled Entry 7/16/21 2220)   polyethylene glycol (MIRALAX) packet 17 g (17 g Oral Given 7/16/21 0839)   methocarbamol (ROBAXIN) tablet 750 mg (750 mg Oral Given 7/16/21 0138)   cloNIDine (CATAPRES) tablet 0 1 mg (0 1 mg Oral Given 7/16/21 2131)   gabapentin (NEURONTIN) capsule 200 mg (200 mg Oral Given 7/16/21 1742)   ketorolac (TORADOL) injection 15 mg (15 mg Intravenous Given 7/15/21 1150)   vancomycin (VANCOCIN) 1,250 mg in sodium chloride 0 9 % 250 mL IVPB (1,250 mg Intravenous New Bag 7/16/21 2131)   HYDROmorphone (DILAUDID) injection 0 5 mg (0 5 mg Intravenous Given 7/16/21 2127)   ketorolac (TORADOL) injection 15 mg (15 mg Intramuscular Given 7/11/21 1618)   midazolam (VERSED) injection 4 mg (4 mg Intramuscular Given 7/11/21 1618)   morphine (PF) 4 mg/mL injection 4 mg (4 mg Intramuscular Given 7/11/21 1709)   sodium chloride 0 9 % bolus 1,000 mL (0 mL Intravenous Stopped 7/11/21 1930)   HYDROmorphone HCl (DILAUDID) injection 0 2 mg (0 2 mg Intravenous Given 7/11/21 1916)   Ketamine HCl 19 mg (19 mg Intravenous Given 7/11/21 2204) iohexol (OMNIPAQUE) 350 MG/ML injection (MULTI-DOSE) 100 mL (100 mL Intravenous Given 7/12/21 0244)   HYDROmorphone HCl (DILAUDID) injection 0 2 mg (0 2 mg Intravenous Given 7/12/21 0422)   vancomycin (VANCOCIN) IVPB (premix in dextrose) 1,000 mg 200 mL (1,000 mg Intravenous New Bag 7/12/21 0524)   ceftriaxone (ROCEPHIN) 1 g/50 mL in dextrose IVPB (0 mg Intravenous Stopped 7/12/21 0523)   multi-electrolyte (ISOLYTE-S PH 7 4) bolus 500 mL (0 mL Intravenous Stopped 7/12/21 0700)   multi-electrolyte (ISOLYTE-S PH 7 4) bolus 1,000 mL (0 mL Intravenous Stopped 7/12/21 1433)     Followed by   multi-electrolyte (ISOLYTE-S PH 7 4) bolus 1,000 mL (1,000 mL Intravenous New Bag 7/12/21 1436)   diazepam (VALIUM) injection 5 mg (5 mg Intravenous Given 7/12/21 0851)   vancomycin (VANCOCIN) IVPB (premix in dextrose) 500 mg 100 mL (500 mg Intravenous New Bag 7/12/21 1106)   potassium chloride (K-DUR,KLOR-CON) CR tablet 40 mEq (40 mEq Oral Given 7/12/21 1752)   potassium chloride 20 mEq IVPB (premix) (20 mEq Intravenous New Bag 7/14/21 0834)   potassium chloride (K-DUR,KLOR-CON) CR tablet 40 mEq (40 mEq Oral Given 7/14/21 1554)   gabapentin (NEURONTIN) capsule 100 mg (100 mg Oral Given 7/15/21 0101)   lidocaine (PF) (XYLOCAINE-MPF) 1 % injection 10 mL (10 mL Infiltration Given 7/16/21 1249)       Diagnostic Studies  Results Reviewed     Procedure Component Value Units Date/Time    Blood culture [[id number]]  (Abnormal)  (Susceptibility) Collected: 07/11/21 1805    Lab Status: Final result Specimen: Blood from Arm, Right Updated: 07/14/21 1147     Blood Culture Methicillin Resistant Staphylococcus aureus     Gram Stain Result Gram positive cocci in clusters    Susceptibility     Methicillin Resistant Staphylococcus aureus (1)     Antibiotic Interpretation Microscan Method Status    Ampicillin ($$) Resistant <=2 00 ug/ml JIMENEZ Final    Cefazolin ($) Resistant <=4 00 ug/ml JIMENEZ Final    Clindamycin ($) Susceptible <=0 25 ug/ml JIMENEZ Final Erythromycin ($$$$) Susceptible 0 50 ug/ml JIMENEZ Final    Gentamicin ($$) Susceptible <=1 ug/ml JIMENEZ Final    Oxacillin Resistant <=0 25 ug/ml JIMENEZ Final    Tetracycline Susceptible <=2 ug/ml JIMENEZ Final    Trimethoprim + Sulfamethoxazole ($$$) Susceptible <=0 5/9 5 ug/ml JIMENEZ Final    Vancomycin ($) Susceptible 1 00 ug/ml JIMENEZ Final                   Urine culture [[id number]] Collected: 07/12/21 0210    Lab Status: Final result Specimen: Urine, Other Updated: 07/13/21 0822     Urine Culture 40,000-49,000 cfu/ml     Novel Coronavirus (Covid-19),PCR [facility] [[id number]]  (Normal) Collected: 07/12/21 0653    Lab Status: Final result Specimen: Nares from Nose Updated: 07/12/21 0802     SARS-CoV-2 Negative    Narrative:           Procalcitonin with AM Reflex [[id number]]  (Abnormal) Collected: 07/12/21 0430    Lab Status: Final result Specimen: Blood from Arm, Right Updated: 07/12/21 0512     Procalcitonin 0 77 ng/ml     Protime-INR [[id number]]  (Abnormal) Collected: 07/12/21 0430    Lab Status: Final result Specimen: Blood from Arm, Right Updated: 07/12/21 0509     Protime 14 9 seconds      INR 1 17    APTT [[id number]]  (Normal) Collected: 07/12/21 0430    Lab Status: Final result Specimen: Blood from Arm, Right Updated: 07/12/21 0509     PTT 37 seconds     Urine Microscopic [[id number]]  (Abnormal) Collected: 07/12/21 0210    Lab Status: Final result Specimen: Urine, Other Updated: 07/12/21 0259     RBC, UA None Seen /hpf      WBC, UA 10-20 /hpf      Epithelial Cells Occasional /hpf      Bacteria, UA Occasional /hpf      OTHER CRYSTALS Ammonium Urate /hpf      MUCUS THREADS Moderate    UA w Reflex to Microscopic w Reflex to Culture [[id number]]  (Abnormal) Collected: 07/12/21 0210    Lab Status: Final result Specimen: Urine, Other Updated: 07/12/21 0233     Color, UA Dk Yellow     Clarity, UA Turbid     Specific Gravity, UA 1 025     pH, UA 6 0     Leukocytes, UA Small     Nitrite, UA Negative     Protein, UA Trace mg/dl Glucose, UA Negative mg/dl      Ketones, UA 15 (1+) mg/dl      Urobilinogen, UA 2 0 E U /dl      Bilirubin, UA Interference- unable to analyze     Blood, UA Trace    Lactic acid [[id number]]  (Normal) Collected: 07/11/21 1805    Lab Status: Final result Specimen: Blood from Arm, Right Updated: 07/11/21 1856     LACTIC ACID 1 2 mmol/L     Narrative:      Result may be elevated if tourniquet was used during collection      Troponin I [[id number]]  (Normal) Collected: 07/11/21 1805    Lab Status: Final result Specimen: Blood from Arm, Right Updated: 07/11/21 1855     Troponin I <0 02 ng/mL     Comprehensive metabolic panel [[id number]]  (Abnormal) Collected: 07/11/21 1805    Lab Status: Final result Specimen: Blood from Arm, Right Updated: 07/11/21 1855     Sodium 134 mmol/L      Potassium 3 0 mmol/L      Chloride 100 mmol/L      CO2 27 mmol/L      ANION GAP 7 mmol/L      BUN 10 mg/dL      Creatinine 0 52 mg/dL      Glucose 92 mg/dL      Calcium 8 5 mg/dL      Corrected Calcium 9 2 mg/dL      AST 42 U/L      ALT 56 U/L      Alkaline Phosphatase 110 U/L      Total Protein 7 0 g/dL      Albumin 3 1 g/dL      Total Bilirubin 0 78 mg/dL      eGFR 130 ml/min/1 73sq m     Narrative:      Christian guidelines for Chronic Kidney Disease (CKD):     Stage 1 with normal or high GFR (GFR > 90 mL/min/1 73 square meters)    Stage 2 Mild CKD (GFR = 60-89 mL/min/1 73 square meters)    Stage 3A Moderate CKD (GFR = 45-59 mL/min/1 73 square meters)    Stage 3B Moderate CKD (GFR = 30-44 mL/min/1 73 square meters)    Stage 4 Severe CKD (GFR = 15-29 mL/min/1 73 square meters)    Stage 5 End Stage CKD (GFR <15 mL/min/1 73 square meters)  Note: GFR calculation is accurate only with a steady state creatinine    CBC and differential [[id number]]  (Abnormal) Collected: 07/11/21 1805    Lab Status: Final result Specimen: Blood from Arm, Right Updated: 07/11/21 1836     WBC 13 07 Thousand/uL      RBC 3 87 Million/uL Hemoglobin 11 1 g/dL      Hematocrit 32 5 %      MCV 84 fL      MCH 28 7 pg      MCHC 34 2 g/dL      RDW 12 9 %      MPV 9 1 fL      Platelets 004 Thousands/uL      nRBC 0 /100 WBCs      Neutrophils Relative 73 %      Immat GRANS % 1 %      Lymphocytes Relative 14 %      Monocytes Relative 11 %      Eosinophils Relative 1 %      Basophils Relative 0 %      Neutrophils Absolute 9 52 Thousands/µL      Immature Grans Absolute 0 11 Thousand/uL      Lymphocytes Absolute 1 83 Thousands/µL      Monocytes Absolute 1 48 Thousand/µL      Eosinophils Absolute 0 11 Thousand/µL      Basophils Absolute 0 02 Thousands/µL                  CT recon only lumbar spine (No Charge)   Final Result by Obi Echeverria MD (07/13 0656)      No CT findings consistent with discitis osteomyelitis  No paraspinal soft tissue pathology  Chronic left L5 spondylolysis without spondylolisthesis  No fracture or traumatic subluxation  Groundglass opacities at the lung base as described on concurrent CT of the abdomen  Workstation performed: [id number]         XR foot 3+ vw left   Final Result by Kaushik Garcia MD (07/13 1408)      No acute osseous abnormality  Workstation performed: [id number]         XR foot 3+ vw right   Final Result by Kaushik Garcia MD (07/13 1409)      No acute osseous abnormality  Workstation performed: [id number]         CT abdomen pelvis with contrast   Final Result by Gagan Frederick MD (07/12 0402)      Small scattered peripheral groundglass opacities within bilateral lung bases may be due to pneumonia, septic emboli in the setting of IV drug use, or Covid-19 infection  The study was marked in [facility] for immediate notification              Workstation performed: [id number]               Procedures  Procedures      ED Course     Patient refused CT scan  Patient refused potassium  Patient sleeping comfortably after receiving pain medicine, however states she is still in pain and requesting additional pain medication                  Initial Sepsis Screening     Row Name 07/12/21 0419                Is the patient's history suggestive of a new or worsening infection? (!) Yes (Proceed)  -CL        Suspected source of infection  pneumonia  -CL        Are two or more of the following signs & symptoms of infection both present and new to the patient? (!) Yes (Proceed)  -CL        Indicate SIRS criteria  Tachypnea > 20 resp per min;Leukocytosis (WBC > 96607 IJL)  -CL        If the answer is yes to both questions, suspicion of sepsis is present  --        If severe sepsis is present AND tissue hypoperfusion perists in the hour after fluid resuscitation or lactate > 4, the patient meets criteria for SEPTIC SHOCK  --        Are any of the following organ dysfunction criteria present within 6 hours of suspected infection and SIRS criteria that are NOT considered to be chronic conditions?   No  -CL        Organ dysfunction  --        Date of presentation of severe sepsis  --        Time of presentation of severe sepsis  --        Tissue hypoperfusion persists in the hour after crystalloid fluid administration, evidenced, by either:  --        Was hypotension present within one hour of the conclusion of crystalloid fluid administration?  --        Date of presentation of septic shock  --        Time of presentation of septic shock  --          User Key  (r) = Recorded By, (t) = Taken By, (c) = Cosigned By    [address] Name Provider Lissa Stallings MD Resident                    MDM  Number of Diagnoses or Management Options  Flank pain  Ground glass opacity present on imaging of lung  Hypokalemia  Leukocytosis  Murmur, cardiac  Sepsis ([address] )  Substance abuse ([address] )  Diagnosis management comments: 30F with past medical history of polysubstance abuse, endocarditis status post tricuspid valve repair, hepatitis-C, and bipolar disorder who presents to the emergency department with pain in the right flank, hands, and feet  Differential includes nephrolithiasis, kidney infarction, abscess, retained products of conception, osteomyelitis, cellulitis, septic emboli  Workup including CBC and CMP, however at the time of sign-out unable to complete workup as patient refusing further testing or treatment  Patient signed out to Dr Alisha Loco for continued workup and management in the emergency department        Disposition  Final diagnoses:   Flank pain   Hypokalemia   Leukocytosis   Substance abuse (HCC)   Ground glass opacity present on imaging of lung - Pneumonia vs septic emboli vs COVID   Sepsis ([facility] )   Murmur, cardiac     Time reflects when diagnosis was documented in both MDM as applicable and the Disposition within this note     Time User Action Codes Description Comment    7/11/2021 11:17 PM [place] Pagan Add [R10 9] Flank pain     7/12/2021  1:58 AM Nasima Binet A Add [E87 6] Hypokalemia     7/12/2021  1:58 AM Nasima Binet A Add [D72 829] Leukocytosis     7/12/2021  4:24 AM Nasima Binet A Add [F19 10] Substance abuse ([facility] )     7/12/2021  4:24 AM Alisha Loco Rozena Avelino A Add [R91 8] Ground glass opacity present on imaging of lung     7/12/2021  4:24 AM Legare, Rozena Avelino A Modify [R91 8] Ground glass opacity present on imaging of lung Pneumonia vs septic emboli vs COVID    7/12/2021  4:32 AM Legare, Rozena Avelino A Add [A41 9] Sepsis ([facility] )     7/12/2021  4:32 AM Nasima Binet A Modify [R10 9] Flank pain     7/12/2021  4:32 AM Nasima Binet A Modify [A41 9] Sepsis ([facility] )     7/12/2021  4:35 AM Krystalamrik Siddiqui S Add [I76] Septic embolism ([facility] [address] )     7/12/2021  4:42 AM Emily Bentonzena Avelino A Add [R01 1] Murmur, cardiac     7/12/2021  5:00 AM Dara Mckinney Add [F19 10] Polysubstance abuse ([facility] )     7/12/2021  5:00 AM Dara Mckinney Add [F11 10] Heroin abuse ([facility] )     7/16/2021  9:18 AM Surendra Romero [L02 91] Abscess     7/16/2021 10:46 AM Tash Romero Add [T78 79, B95 62] MRSA bacteremia       ED Disposition     ED Disposition Condition Date/Time Comment    Admit Stable Mon Jul 12, 2021  4:24 AM Case was discussed with CATHY and the patient's admission status was agreed to be to the service of Dr Ammy Fajardo  Follow-up Information    None         Current Discharge Medication List      CONTINUE these medications which have NOT CHANGED    Details   apixaban (ELIQUIS) 5 mg Take 2 tablets (10 mg total) by mouth 2 (two) times a day for 7 days, THEN 1 tablet (5 mg total) 2 (two) times a day for 23 days  Qty: 74 tablet, Refills: 0    Comments: Eliquis Starter Pack  Associated Diagnoses: DVT of axillary vein, acute left (HCC)      ARIPiprazole (ABILIFY) 10 mg tablet Take 10 mg by mouth daily Dosage unknown      clonazePAM (KlonoPIN) 0 5 mg tablet Take 1 tablet (0 5 mg total) by mouth 2 (two) times a day for 3 days  Qty: 6 tablet, Refills: 0    Associated Diagnoses: Bipolar 1 disorder (HCC)      gabapentin (NEURONTIN) 300 mg capsule Take 1 capsule (300 mg total) by mouth 2 (two) times a day for 7 days  Qty: 14 capsule, Refills: 0    Associated Diagnoses: Phlegmonous cellulitis      prazosin (MINIPRESS) 1 mg capsule Take 1 capsule (1 mg total) by mouth daily at bedtime  Qty: 30 capsule, Refills: 0    Associated Diagnoses: Bipolar 1 disorder ([address] )           No discharge procedures on file  PDMP Review       Value Time User    PDMP Reviewed  Yes 5/25/2021 11:18 PM Silverio Nicolas MD           ED Provider  Attending physically available and evaluated Lennox Avila I managed the patient along with the ED Attending      Electronically Signed by         Eloy Prakash MD  07/17/21 5853

## 2021-07-12 ENCOUNTER — APPOINTMENT (INPATIENT)
Dept: RADIOLOGY | Facility: HOSPITAL | Age: 31
DRG: 710 | End: 2021-07-12
Payer: COMMERCIAL

## 2021-07-12 ENCOUNTER — TELEPHONE (OUTPATIENT)
Dept: RADIOLOGY | Facility: HOSPITAL | Age: 31
End: 2021-07-12

## 2021-07-12 ENCOUNTER — APPOINTMENT (EMERGENCY)
Dept: RADIOLOGY | Facility: HOSPITAL | Age: 31
DRG: 710 | End: 2021-07-12
Payer: COMMERCIAL

## 2021-07-12 PROBLEM — L03.119 CELLULITIS OF LOWER EXTREMITY: Status: ACTIVE | Noted: 2021-07-12

## 2021-07-12 PROBLEM — M54.50 LOW BACK PAIN: Status: ACTIVE | Noted: 2021-07-12

## 2021-07-12 PROBLEM — Z87.898 HISTORY OF BACTEREMIA: Status: ACTIVE | Noted: 2021-07-12

## 2021-07-12 PROBLEM — F11.10 HEROIN ABUSE (HCC): Status: ACTIVE | Noted: 2021-07-12

## 2021-07-12 LAB
APTT PPP: 37 SECONDS (ref 23–37)
ATRIAL RATE: 56 BPM
BACTERIA UR QL AUTO: ABNORMAL /HPF
BILIRUB UR QL STRIP: ABNORMAL
BILIRUB UR QL STRIP: NEGATIVE
CLARITY UR: ABNORMAL
CLARITY UR: CLEAR
COLOR UR: ABNORMAL
COLOR UR: YELLOW
CRYSTALS URNS QL MICRO: ABNORMAL /HPF
GLUCOSE UR STRIP-MCNC: NEGATIVE MG/DL
GLUCOSE UR STRIP-MCNC: NEGATIVE MG/DL
HGB UR QL STRIP.AUTO: ABNORMAL
HGB UR QL STRIP.AUTO: NEGATIVE
INR PPP: 1.17 (ref 0.84–1.19)
KETONES UR STRIP-MCNC: ABNORMAL MG/DL
KETONES UR STRIP-MCNC: ABNORMAL MG/DL
LEUKOCYTE ESTERASE UR QL STRIP: ABNORMAL
LEUKOCYTE ESTERASE UR QL STRIP: NEGATIVE
MUCOUS THREADS UR QL AUTO: ABNORMAL
NITRITE UR QL STRIP: NEGATIVE
NITRITE UR QL STRIP: NEGATIVE
NON-SQ EPI CELLS URNS QL MICRO: ABNORMAL /HPF
P AXIS: 59 DEGREES
PH UR STRIP.AUTO: 6 [PH]
PH UR STRIP.AUTO: 7 [PH]
PR INTERVAL: 152 MS
PROCALCITONIN SERPL-MCNC: 0.77 NG/ML
PROT UR STRIP-MCNC: ABNORMAL MG/DL
PROT UR STRIP-MCNC: NEGATIVE MG/DL
PROTHROMBIN TIME: 14.9 SECONDS (ref 11.6–14.5)
QRS AXIS: 87 DEGREES
QRSD INTERVAL: 82 MS
QT INTERVAL: 430 MS
QTC INTERVAL: 414 MS
RBC #/AREA URNS AUTO: ABNORMAL /HPF
SARS-COV-2 RNA RESP QL NAA+PROBE: NEGATIVE
SP GR UR STRIP.AUTO: 1.01 (ref 1–1.03)
SP GR UR STRIP.AUTO: 1.02 (ref 1–1.03)
T WAVE AXIS: 69 DEGREES
UROBILINOGEN UR QL STRIP.AUTO: 1 E.U./DL
UROBILINOGEN UR QL STRIP.AUTO: 2 E.U./DL
VENTRICULAR RATE: 56 BPM
WBC #/AREA URNS AUTO: ABNORMAL /HPF

## 2021-07-12 PROCEDURE — U0003 INFECTIOUS AGENT DETECTION BY NUCLEIC ACID (DNA OR RNA); SEVERE ACUTE RESPIRATORY SYNDROME CORONAVIRUS 2 (SARS-COV-2) (CORONAVIRUS DISEASE [COVID-19]), AMPLIFIED PROBE TECHNIQUE, MAKING USE OF HIGH THROUGHPUT TECHNOLOGIES AS DESCRIBED BY CMS-2020-01-R: HCPCS | Performed by: EMERGENCY MEDICINE

## 2021-07-12 PROCEDURE — 93010 ELECTROCARDIOGRAM REPORT: CPT | Performed by: INTERNAL MEDICINE

## 2021-07-12 PROCEDURE — 73630 X-RAY EXAM OF FOOT: CPT

## 2021-07-12 PROCEDURE — 96375 TX/PRO/DX INJ NEW DRUG ADDON: CPT

## 2021-07-12 PROCEDURE — 99223 1ST HOSP IP/OBS HIGH 75: CPT | Performed by: INTERNAL MEDICINE

## 2021-07-12 PROCEDURE — 87086 URINE CULTURE/COLONY COUNT: CPT | Performed by: EMERGENCY MEDICINE

## 2021-07-12 PROCEDURE — 81003 URINALYSIS AUTO W/O SCOPE: CPT | Performed by: INTERNAL MEDICINE

## 2021-07-12 PROCEDURE — 85610 PROTHROMBIN TIME: CPT | Performed by: EMERGENCY MEDICINE

## 2021-07-12 PROCEDURE — 99255 IP/OBS CONSLTJ NEW/EST HI 80: CPT | Performed by: INTERNAL MEDICINE

## 2021-07-12 PROCEDURE — G1004 CDSM NDSC: HCPCS

## 2021-07-12 PROCEDURE — 74177 CT ABD & PELVIS W/CONTRAST: CPT

## 2021-07-12 PROCEDURE — 81001 URINALYSIS AUTO W/SCOPE: CPT | Performed by: EMERGENCY MEDICINE

## 2021-07-12 PROCEDURE — U0005 INFEC AGEN DETEC AMPLI PROBE: HCPCS | Performed by: EMERGENCY MEDICINE

## 2021-07-12 PROCEDURE — 99232 SBSQ HOSP IP/OBS MODERATE 35: CPT | Performed by: NURSE PRACTITIONER

## 2021-07-12 PROCEDURE — 84145 PROCALCITONIN (PCT): CPT | Performed by: EMERGENCY MEDICINE

## 2021-07-12 PROCEDURE — 36415 COLL VENOUS BLD VENIPUNCTURE: CPT | Performed by: EMERGENCY MEDICINE

## 2021-07-12 PROCEDURE — 96376 TX/PRO/DX INJ SAME DRUG ADON: CPT

## 2021-07-12 PROCEDURE — 85730 THROMBOPLASTIN TIME PARTIAL: CPT | Performed by: EMERGENCY MEDICINE

## 2021-07-12 RX ORDER — ACETAMINOPHEN 325 MG/1
650 TABLET ORAL EVERY 6 HOURS PRN
Status: DISCONTINUED | OUTPATIENT
Start: 2021-07-12 | End: 2021-07-12

## 2021-07-12 RX ORDER — SODIUM CHLORIDE, SODIUM GLUCONATE, SODIUM ACETATE, POTASSIUM CHLORIDE, MAGNESIUM CHLORIDE, SODIUM PHOSPHATE, DIBASIC, AND POTASSIUM PHOSPHATE .53; .5; .37; .037; .03; .012; .00082 G/100ML; G/100ML; G/100ML; G/100ML; G/100ML; G/100ML; G/100ML
1000 INJECTION, SOLUTION INTRAVENOUS ONCE
Status: COMPLETED | OUTPATIENT
Start: 2021-07-12 | End: 2021-07-12

## 2021-07-12 RX ORDER — CLONIDINE HYDROCHLORIDE 0.2 MG/1
0.2 TABLET ORAL EVERY 12 HOURS SCHEDULED
Status: DISCONTINUED | OUTPATIENT
Start: 2021-07-12 | End: 2021-07-13

## 2021-07-12 RX ORDER — VANCOMYCIN HYDROCHLORIDE 500 MG/100ML
10 INJECTION, SOLUTION INTRAVENOUS ONCE
Status: COMPLETED | OUTPATIENT
Start: 2021-07-12 | End: 2021-07-12

## 2021-07-12 RX ORDER — GABAPENTIN 300 MG/1
300 CAPSULE ORAL 2 TIMES DAILY
Status: DISCONTINUED | OUTPATIENT
Start: 2021-07-12 | End: 2021-07-13

## 2021-07-12 RX ORDER — POTASSIUM CHLORIDE 20 MEQ/1
40 TABLET, EXTENDED RELEASE ORAL ONCE
Status: COMPLETED | OUTPATIENT
Start: 2021-07-12 | End: 2021-07-12

## 2021-07-12 RX ORDER — DOCUSATE SODIUM 100 MG/1
100 CAPSULE, LIQUID FILLED ORAL 2 TIMES DAILY PRN
Status: DISCONTINUED | OUTPATIENT
Start: 2021-07-12 | End: 2021-07-18 | Stop reason: HOSPADM

## 2021-07-12 RX ORDER — PRAZOSIN HYDROCHLORIDE 1 MG/1
1 CAPSULE ORAL
Status: DISCONTINUED | OUTPATIENT
Start: 2021-07-12 | End: 2021-07-18 | Stop reason: HOSPADM

## 2021-07-12 RX ORDER — HYDROMORPHONE HCL IN WATER/PF 6 MG/30 ML
0.2 PATIENT CONTROLLED ANALGESIA SYRINGE INTRAVENOUS ONCE
Status: COMPLETED | OUTPATIENT
Start: 2021-07-12 | End: 2021-07-12

## 2021-07-12 RX ORDER — CLONAZEPAM 0.5 MG/1
0.5 TABLET ORAL 2 TIMES DAILY
Status: DISCONTINUED | OUTPATIENT
Start: 2021-07-12 | End: 2021-07-18 | Stop reason: HOSPADM

## 2021-07-12 RX ORDER — ARIPIPRAZOLE 10 MG/1
10 TABLET ORAL DAILY
Status: DISCONTINUED | OUTPATIENT
Start: 2021-07-12 | End: 2021-07-18 | Stop reason: HOSPADM

## 2021-07-12 RX ORDER — ONDANSETRON 2 MG/ML
4 INJECTION INTRAMUSCULAR; INTRAVENOUS EVERY 6 HOURS PRN
Status: DISCONTINUED | OUTPATIENT
Start: 2021-07-12 | End: 2021-07-18 | Stop reason: HOSPADM

## 2021-07-12 RX ORDER — OXYCODONE HYDROCHLORIDE 10 MG/1
10 TABLET ORAL EVERY 4 HOURS PRN
Status: DISCONTINUED | OUTPATIENT
Start: 2021-07-12 | End: 2021-07-12

## 2021-07-12 RX ORDER — ACETAMINOPHEN 325 MG/1
975 TABLET ORAL EVERY 8 HOURS SCHEDULED
Status: DISCONTINUED | OUTPATIENT
Start: 2021-07-12 | End: 2021-07-18 | Stop reason: HOSPADM

## 2021-07-12 RX ORDER — MAGNESIUM HYDROXIDE/ALUMINUM HYDROXICE/SIMETHICONE 120; 1200; 1200 MG/30ML; MG/30ML; MG/30ML
30 SUSPENSION ORAL EVERY 6 HOURS PRN
Status: DISCONTINUED | OUTPATIENT
Start: 2021-07-12 | End: 2021-07-18 | Stop reason: HOSPADM

## 2021-07-12 RX ORDER — SODIUM CHLORIDE, SODIUM GLUCONATE, SODIUM ACETATE, POTASSIUM CHLORIDE, MAGNESIUM CHLORIDE, SODIUM PHOSPHATE, DIBASIC, AND POTASSIUM PHOSPHATE .53; .5; .37; .037; .03; .012; .00082 G/100ML; G/100ML; G/100ML; G/100ML; G/100ML; G/100ML; G/100ML
75 INJECTION, SOLUTION INTRAVENOUS CONTINUOUS
Status: DISCONTINUED | OUTPATIENT
Start: 2021-07-12 | End: 2021-07-13

## 2021-07-12 RX ORDER — POLYETHYLENE GLYCOL 3350 17 G/17G
17 POWDER, FOR SOLUTION ORAL DAILY
Status: DISCONTINUED | OUTPATIENT
Start: 2021-07-12 | End: 2021-07-18 | Stop reason: HOSPADM

## 2021-07-12 RX ORDER — NICOTINE 21 MG/24HR
1 PATCH, TRANSDERMAL 24 HOURS TRANSDERMAL DAILY
Status: DISCONTINUED | OUTPATIENT
Start: 2021-07-12 | End: 2021-07-18 | Stop reason: HOSPADM

## 2021-07-12 RX ORDER — HYDROMORPHONE HCL/PF 1 MG/ML
0.5 SYRINGE (ML) INJECTION
Status: DISCONTINUED | OUTPATIENT
Start: 2021-07-12 | End: 2021-07-12

## 2021-07-12 RX ORDER — SODIUM CHLORIDE, SODIUM GLUCONATE, SODIUM ACETATE, POTASSIUM CHLORIDE, MAGNESIUM CHLORIDE, SODIUM PHOSPHATE, DIBASIC, AND POTASSIUM PHOSPHATE .53; .5; .37; .037; .03; .012; .00082 G/100ML; G/100ML; G/100ML; G/100ML; G/100ML; G/100ML; G/100ML
500 INJECTION, SOLUTION INTRAVENOUS ONCE
Status: COMPLETED | OUTPATIENT
Start: 2021-07-12 | End: 2021-07-12

## 2021-07-12 RX ORDER — SODIUM CHLORIDE, SODIUM GLUCONATE, SODIUM ACETATE, POTASSIUM CHLORIDE, MAGNESIUM CHLORIDE, SODIUM PHOSPHATE, DIBASIC, AND POTASSIUM PHOSPHATE .53; .5; .37; .037; .03; .012; .00082 G/100ML; G/100ML; G/100ML; G/100ML; G/100ML; G/100ML; G/100ML
1000 INJECTION, SOLUTION INTRAVENOUS ONCE
Status: DISCONTINUED | OUTPATIENT
Start: 2021-07-12 | End: 2021-07-12

## 2021-07-12 RX ORDER — DIAZEPAM 5 MG/ML
5 INJECTION, SOLUTION INTRAMUSCULAR; INTRAVENOUS ONCE
Status: COMPLETED | OUTPATIENT
Start: 2021-07-12 | End: 2021-07-12

## 2021-07-12 RX ORDER — POTASSIUM CHLORIDE 20 MEQ/1
40 TABLET, EXTENDED RELEASE ORAL ONCE
Status: DISCONTINUED | OUTPATIENT
Start: 2021-07-12 | End: 2021-07-13

## 2021-07-12 RX ORDER — VANCOMYCIN HYDROCHLORIDE 1 G/200ML
15 INJECTION, SOLUTION INTRAVENOUS ONCE
Status: COMPLETED | OUTPATIENT
Start: 2021-07-12 | End: 2021-07-12

## 2021-07-12 RX ORDER — CEFAZOLIN SODIUM 1 G/50ML
1000 SOLUTION INTRAVENOUS EVERY 8 HOURS
Status: DISCONTINUED | OUTPATIENT
Start: 2021-07-12 | End: 2021-07-12

## 2021-07-12 RX ORDER — OXYCODONE HYDROCHLORIDE 10 MG/1
10 TABLET ORAL EVERY 4 HOURS PRN
Status: DISCONTINUED | OUTPATIENT
Start: 2021-07-12 | End: 2021-07-18 | Stop reason: HOSPADM

## 2021-07-12 RX ORDER — OXYCODONE HYDROCHLORIDE 5 MG/1
5 TABLET ORAL EVERY 4 HOURS PRN
Status: DISCONTINUED | OUTPATIENT
Start: 2021-07-12 | End: 2021-07-12

## 2021-07-12 RX ORDER — CEFAZOLIN SODIUM 2 G/50ML
2000 SOLUTION INTRAVENOUS EVERY 8 HOURS
Status: DISCONTINUED | OUTPATIENT
Start: 2021-07-12 | End: 2021-07-14

## 2021-07-12 RX ORDER — HYDROMORPHONE HCL/PF 1 MG/ML
0.5 SYRINGE (ML) INJECTION
Status: DISCONTINUED | OUTPATIENT
Start: 2021-07-12 | End: 2021-07-14

## 2021-07-12 RX ORDER — METHOCARBAMOL 750 MG/1
750 TABLET, FILM COATED ORAL EVERY 6 HOURS SCHEDULED
Status: DISCONTINUED | OUTPATIENT
Start: 2021-07-12 | End: 2021-07-13

## 2021-07-12 RX ADMIN — VANCOMYCIN HYDROCHLORIDE 1250 MG: 10 INJECTION, POWDER, LYOPHILIZED, FOR SOLUTION INTRAVENOUS at 17:07

## 2021-07-12 RX ADMIN — OXYCODONE HYDROCHLORIDE 10 MG: 10 TABLET ORAL at 05:23

## 2021-07-12 RX ADMIN — GABAPENTIN 300 MG: 300 CAPSULE ORAL at 17:53

## 2021-07-12 RX ADMIN — CEFAZOLIN SODIUM 2000 MG: 2 SOLUTION INTRAVENOUS at 19:33

## 2021-07-12 RX ADMIN — CLONAZEPAM 0.5 MG: 0.5 TABLET ORAL at 17:52

## 2021-07-12 RX ADMIN — SODIUM CHLORIDE, SODIUM GLUCONATE, SODIUM ACETATE, POTASSIUM CHLORIDE, MAGNESIUM CHLORIDE, SODIUM PHOSPHATE, DIBASIC, AND POTASSIUM PHOSPHATE 500 ML: .53; .5; .37; .037; .03; .012; .00082 INJECTION, SOLUTION INTRAVENOUS at 04:33

## 2021-07-12 RX ADMIN — METHOCARBAMOL TABLETS 750 MG: 750 TABLET, COATED ORAL at 09:03

## 2021-07-12 RX ADMIN — POTASSIUM CHLORIDE 40 MEQ: 1500 TABLET, EXTENDED RELEASE ORAL at 17:52

## 2021-07-12 RX ADMIN — CEFTRIAXONE SODIUM 1000 MG: 10 INJECTION, POWDER, FOR SOLUTION INTRAVENOUS at 04:32

## 2021-07-12 RX ADMIN — ARIPIPRAZOLE 10 MG: 10 TABLET ORAL at 12:06

## 2021-07-12 RX ADMIN — ACETAMINOPHEN 975 MG: 325 TABLET, FILM COATED ORAL at 21:25

## 2021-07-12 RX ADMIN — HYDROMORPHONE HYDROCHLORIDE 0.5 MG: 1 INJECTION, SOLUTION INTRAMUSCULAR; INTRAVENOUS; SUBCUTANEOUS at 21:42

## 2021-07-12 RX ADMIN — SODIUM CHLORIDE, SODIUM GLUCONATE, SODIUM ACETATE, POTASSIUM CHLORIDE, MAGNESIUM CHLORIDE, SODIUM PHOSPHATE, DIBASIC, AND POTASSIUM PHOSPHATE 75 ML/HR: .53; .5; .37; .037; .03; .012; .00082 INJECTION, SOLUTION INTRAVENOUS at 15:47

## 2021-07-12 RX ADMIN — HYDROMORPHONE HYDROCHLORIDE 0.5 MG: 1 INJECTION, SOLUTION INTRAMUSCULAR; INTRAVENOUS; SUBCUTANEOUS at 06:48

## 2021-07-12 RX ADMIN — OXYCODONE HYDROCHLORIDE 15 MG: 10 TABLET ORAL at 12:06

## 2021-07-12 RX ADMIN — APIXABAN 5 MG: 5 TABLET, FILM COATED ORAL at 08:51

## 2021-07-12 RX ADMIN — SODIUM CHLORIDE, SODIUM GLUCONATE, SODIUM ACETATE, POTASSIUM CHLORIDE, MAGNESIUM CHLORIDE, SODIUM PHOSPHATE, DIBASIC, AND POTASSIUM PHOSPHATE 1000 ML: .53; .5; .37; .037; .03; .012; .00082 INJECTION, SOLUTION INTRAVENOUS at 13:14

## 2021-07-12 RX ADMIN — DIAZEPAM 5 MG: 5 INJECTION, SOLUTION INTRAMUSCULAR; INTRAVENOUS at 08:51

## 2021-07-12 RX ADMIN — GABAPENTIN 300 MG: 300 CAPSULE ORAL at 08:51

## 2021-07-12 RX ADMIN — OXYCODONE HYDROCHLORIDE 15 MG: 10 TABLET ORAL at 18:19

## 2021-07-12 RX ADMIN — HYDROMORPHONE HYDROCHLORIDE 0.2 MG: 0.2 INJECTION, SOLUTION INTRAMUSCULAR; INTRAVENOUS; SUBCUTANEOUS at 04:22

## 2021-07-12 RX ADMIN — METHOCARBAMOL TABLETS 750 MG: 750 TABLET, COATED ORAL at 17:53

## 2021-07-12 RX ADMIN — APIXABAN 5 MG: 5 TABLET, FILM COATED ORAL at 17:53

## 2021-07-12 RX ADMIN — HYDROMORPHONE HYDROCHLORIDE 0.5 MG: 1 INJECTION, SOLUTION INTRAMUSCULAR; INTRAVENOUS; SUBCUTANEOUS at 14:48

## 2021-07-12 RX ADMIN — ACETAMINOPHEN 975 MG: 325 TABLET, FILM COATED ORAL at 14:48

## 2021-07-12 RX ADMIN — IOHEXOL 100 ML: 350 INJECTION, SOLUTION INTRAVENOUS at 02:44

## 2021-07-12 RX ADMIN — VANCOMYCIN HYDROCHLORIDE 500 MG: 500 INJECTION, SOLUTION INTRAVENOUS at 11:06

## 2021-07-12 RX ADMIN — CEFAZOLIN SODIUM 1000 MG: 1 SOLUTION INTRAVENOUS at 12:13

## 2021-07-12 RX ADMIN — CLONAZEPAM 0.5 MG: 0.5 TABLET ORAL at 08:51

## 2021-07-12 RX ADMIN — VANCOMYCIN HYDROCHLORIDE 1000 MG: 1 INJECTION, SOLUTION INTRAVENOUS at 05:24

## 2021-07-12 RX ADMIN — SODIUM CHLORIDE, SODIUM GLUCONATE, SODIUM ACETATE, POTASSIUM CHLORIDE, MAGNESIUM CHLORIDE, SODIUM PHOSPHATE, DIBASIC, AND POTASSIUM PHOSPHATE 1000 ML: .53; .5; .37; .037; .03; .012; .00082 INJECTION, SOLUTION INTRAVENOUS at 14:36

## 2021-07-12 NOTE — QUICK NOTE
The single admission blood culture now has growth of GPC in clusters  Given only a single blood culture and given poor blood draw/IV access, it is unclear whether this is true bacteremia versus contaminated blood draw  Will recheck blood cultures  Will restart IV vancomycin pending blood culture result

## 2021-07-12 NOTE — ED NOTES
Attempted to complete covid swab on patient, Patient states "please I'm in too much pain for you to do this right now, can you please wait until I get something for pain"     Eduardo Mcdaniels RN  07/12/21 3877

## 2021-07-12 NOTE — ASSESSMENT & PLAN NOTE
· Small scattered peripheral ground-glass opacities within the bilateral lung bases possibly due to pneumonia versus septic emboli in the setting of intravenous drug use  · Blood culture pending  · Infectious disease consult pending  · Intravenous antibiotics with ceftriaxone/vancomycin  · COVID-19 PCR negative  · No respiratory complaints and oxygen saturation adequate on room air

## 2021-07-12 NOTE — UTILIZATION REVIEW
Initial Clinical Review    Admission: Date/Time/Statement:   Admission Orders (From admission, onward)     Ordered        07/12/21 0441  Inpatient Admission  Once                   Orders Placed This Encounter   Procedures    Inpatient Admission     Standing Status:   Standing     Number of Occurrences:   1     Order Specific Question:   Level of Care     Answer:   Med Surg [16]     Order Specific Question:   Estimated length of stay     Answer:   More than 2 Midnights     Order Specific Question:   Certification     Answer:   I certify that inpatient services are medically necessary for this patient for a duration of greater than two midnights  See H&P and MD Progress Notes for additional information about the patient's course of treatment  ED Arrival Information     Expected Arrival Acuity    - 7/11/2021 15:09 Urgent         Means of arrival Escorted by Service Admission type    Walk-In Self General Medicine Urgent         Arrival complaint    Flank pain        Chief Complaint   Patient presents with    Recreational Drug Use     Pt c/o severe right flank pain  Pt also c/o pain in hands and feet  Pt states "I am coming off a relapse"  Pt admits to "shooting up cocaine in my hands and feet and neck last night "         Initial Presentation: 31year old female, presented [place] [facility] [place]  Admitted as Inpatient due to Septic Embolism / Gram + Bacteremia  Past medical history significant for heroin abuse and in fact over the past month has been abusing heroin injecting the substance using veins of her foot or arms  Patient also has multiple visits in emergency rooms across to [address] including [facility] with regards to change in mental status most likely secondary to heroin abuse  Also over the past month she was admitted due to ecchymosis of the neck secondary to retained needle fragment    This month alone she was seen twice due to change in mental status secondary to polysubstance abuse  Date: 07/12/2021  Since late yesterday afternoon, reports generalized pain involving hands and feet as well as neck  She first refused getting the CT scan of the chest abdomen and pelvis because of her excruciating body pains  Of note is that the patient received multiple doses of narcotic medications including hydromorphone 0 2 mg x 2, ketamine 90 mg, Toradol 15 mg x 1, my diazepam 4 mg x 1, morphine 4 mg x 1  Despite all those medications being given as a premedication for her CT scan she refused to be examined and a CT scan taken until she finally went at about 3:00 a m  this morning  That CT scan was finally read as having possibility of pneumonia or even septic emboli in the setting of IV drug use in the bilateral lung bases  Started on Rocephin with vancomycin  Blood culture and other blood work was taken with difficulty and was given submitted early last night     07/12/2021  Consult Acute Pain:  Hold further sedatives until patient is able to participate in conversation - currently lethargic unable to stay awake for conversations s/p IV valium     Consider switching clonidine PO to patch form given inability to safely swallow & AMS to help minimize w/d  Will reassess patient once awake & oriented  Current regimen includes;  Scheduled tylenol  mg q 8 hrs, Abilify PO 10 mg daily, Clonazepam PO 0 5 mg BID, Clonidine PO 0 2 mg BID, Neurontin  mg BID, Robaxin  mg q 6 hrs, PRN oxycodone 10 mg q4 hrs for moderate pain & PRN oxycodone 15 mg q 4 hrs  PRN dilaudid 0 5 mg q3 hrs for breakthrough pain  07/12/2021  Consult ID:  Bilateral feet cellulitis, most likely secondary to active IVDU  Cellulitis is superficial, without purulence drainage done without clinical signs of involvement of deeper structures  Patient has history of MSSA infection previously    For now, will keep patient on narrow spectrum antibiotic for likely MSSA cellulitis  If she does not respond to narrow spectrum antibiotic, will consider broaden antibiotic regimen at that time  Deescalate antibiotic regimen to IV cefazolin  Serial feet exams  Monitor temperature/WBC  Follow-up on pending blood culture  VTE Prophylaxis: Apixaban (Eliquis)  / reason for no mechanical VTE prophylaxis Bilateral cellulitis     Day 2, 07/13/2021  Continues to report severe low back pain and melanie foot pain, right worse  Refusing MRI as she will not remove her nipple piercings and also patient does have retained needle in her neck from prior admission  Continue ATC APAP, lidocaine patch, gabapentin, Robaxin, p r n  Oxycodone 10/15 and intravenous Dilaudid for breakthrough  Repeat blood cultures pending  Continue IV Abx - ancef & Vanco   ECHO: pending  Continue clonidine      ED Triage Vitals   Temperature Pulse Respirations Blood Pressure SpO2   07/11/21 1811 07/11/21 1551 07/11/21 1551 07/11/21 1551 07/11/21 1551   98 1 °F (36 7 °C) 89 (!) 30 (!) 151/118 97 %      Temp Source Heart Rate Source Patient Position - Orthostatic VS BP Location FiO2 (%)   07/11/21 1811 07/11/21 1551 07/11/21 1551 07/11/21 1551 --   Oral Monitor Standing Right arm       Pain Score       07/11/21 1551       Worst Possible Pain          Wt Readings from Last 1 Encounters:   07/12/21 59 kg (130 lb)     Additional Vital Signs:   Date/Time  Temp  Pulse  Resp  BP  MAP (mmHg)  SpO2  O2 Device  Patient Position - Orthostatic VS   07/12/21 1500  98 5 °F (36 9 °C)  67  18  119/67  --  96 %  None (Room air)  Lying   07/12/21 1113  --  --  --  107/57  --  --  --  --   07/12/21 0625  --  87  20  123/77  --  99 %  None (Room air)  --   07/12/21 0215  --  70  20  109/49Abnormal   69  98 %  None (Room air)  Lying   07/11/21 2300  --  84  22  --  --  99 %  --  --   07/11/21 2245  --  74  16  104/58  77  99 %  --  --   07/11/21 2230  --  72  18  100/57  74  98 %  --  --   07/11/21 2215  --  68  16  93/52  69 99 %  None (Room air)  Lying   21 2203  --  76  20  98/58  121  98 %  None (Room air)  Lying   21 1818  --  68  22  114/57  --  98 %  None (Room air)  Lying   21 1811  98 1 °F (36 7 °C)  --  --  --  --  --  --  --     Date and Time Eye Opening Best Verbal Response Best Motor Response [place] Coma Scale Score   21 0815 4 5 6 15   21 0701 4 5 6 15   21 2300 4 5 6 15   21 2245 3 5 6 14   21 2230 3 5 6 14   21 2215 3 5 6 14   21 2203 3 5 6 14     2021 @ 0350  CT abd/pel:  Small scattered peripheral groundglass opacities within bilateral lung bases may be due to pneumonia, septic emboli in the setting of IV drug use, or Covid-19 infection       2021 @ 1938  EC, Sinus Bradycardia    Pertinent Labs/Diagnostic Test Results:   Results from last 7 days   Lab Units 21  0653   SARS-COV-2  Negative     Results from last 7 days   Lab Units 21  1805   WBC Thousand/uL 13 07*   HEMOGLOBIN g/dL 11 1*   HEMATOCRIT % 32 5*   PLATELETS Thousands/uL 299   NEUTROS ABS Thousands/µL 9 52*     Results from last 7 days   Lab Units 21  1805   SODIUM mmol/L 134*   POTASSIUM mmol/L 3 0*   CHLORIDE mmol/L 100   CO2 mmol/L 27   ANION GAP mmol/L 7   BUN mg/dL 10   CREATININE mg/dL 0 52*   EGFR ml/min/1 73sq m 130   CALCIUM mg/dL 8 5     Results from last 7 days   Lab Units 21  1805   AST U/L 42   ALT U/L 56   ALK PHOS U/L 110   TOTAL PROTEIN g/dL 7 0   ALBUMIN g/dL 3 1*   TOTAL BILIRUBIN mg/dL 0 78     Results from last 7 days   Lab Units 21  1805   GLUCOSE RANDOM mg/dL 92     Results from last 7 days   Lab Units 21  1805   TROPONIN I ng/mL <0 02     Results from last 7 days   Lab Units 21  0430   PROTIME seconds 14 9*   INR  1 17   PTT seconds 37     Results from last 7 days   Lab Units 21  0430   PROCALCITONIN ng/ml 0 77*     Results from last 7 days   Lab Units 21  1805   LACTIC ACID mmol/L 1 2     Results from last 7 days   Lab Units 07/12/21  1216 07/12/21  0210   CLARITY UA  Clear Turbid   COLOR UA  Yellow Dk Yellow   SPEC GRAV UA  1 012 1 025   PH UA  7 0 6 0   GLUCOSE UA mg/dl Negative Negative   KETONES UA mg/dl 15 (1+)* 15 (1+)*   BLOOD UA  Negative Trace*   PROTEIN UA mg/dl Negative Trace*   NITRITE UA  Negative Negative   BILIRUBIN UA  Negative Interference- unable to analyze*   UROBILINOGEN UA E U /dl 1 0 2 0*   LEUKOCYTES UA  Negative Small*   WBC UA /hpf  --  10-20*   RBC UA /hpf  --  None Seen   BACTERIA UA /hpf  --  Occasional   EPITHELIAL CELLS WET PREP /hpf  --  Occasional   MUCUS THREADS   --  Moderate*     Results from last 7 days   Lab Units 07/11/21  1805   GRAM STAIN RESULT  BLOOD  Gram positive cocci in clusters*       ED Treatment:   Medication Administration from 07/11/2021 1508 to 07/12/2021 0803       Date/Time Order Dose Route Action     07/11/2021 1618 ketorolac (TORADOL) injection 15 mg 15 mg Intramuscular Given     07/11/2021 1618 midazolam (VERSED) injection 4 mg 4 mg Intramuscular Given     07/11/2021 1709 morphine (PF) 4 mg/mL injection 4 mg 4 mg Intramuscular Given     07/11/2021 1813 sodium chloride 0 9 % bolus 1,000 mL 1,000 mL Intravenous New Bag     07/11/2021 1916 HYDROmorphone HCl (DILAUDID) injection 0 2 mg 0 2 mg Intravenous Given     07/11/2021 2204 Ketamine HCl 19 mg 19 mg Intravenous Given     07/12/2021 0244 iohexol (OMNIPAQUE) 350 MG/ML injection (MULTI-DOSE) 100 mL 100 mL Intravenous Given     07/12/2021 0422 HYDROmorphone HCl (DILAUDID) injection 0 2 mg 0 2 mg Intravenous Given     07/12/2021 0524 vancomycin (VANCOCIN) IVPB (premix in dextrose) 1,000 mg 200 mL 1,000 mg Intravenous New Bag     07/12/2021 0432 ceftriaxone (ROCEPHIN) 1 g/50 mL in dextrose IVPB 1,000 mg Intravenous New Bag     07/12/2021 0433 multi-electrolyte (ISOLYTE-S PH 7 4) bolus 500 mL 500 mL Intravenous New Bag     07/12/2021 0523 oxyCODONE (ROXICODONE) immediate release tablet 10 mg 10 mg Oral Given 07/12/2021 0648 HYDROmorphone (DILAUDID) injection 0 5 mg 0 5 mg Intravenous Given        Past Medical History:   Diagnosis Date    Abnormal Pap smear of cervix     Anxiety     Depression     Endocarditis     2018    Hepatitis C     HPV (human papilloma virus) anogenital infection      Present on Admission:   Septic embolism (HCC)   Bipolar 1 disorder (HCC)      Admitting Diagnosis: Hypokalemia [E87 6]  Substance abuse ([facility] ) [F19 10]  Murmur, cardiac [R01 1]  Leukocytosis [D72 829]  Recreational drug use [F19 90]  Septic embolism (HCC) [I76]  Flank pain [R10 9]  Polysubstance abuse ([facility] [address] ) [F19 10]  Heroin abuse ([facility] ) [F11 10]  Sepsis ([facility] ) [A41 9]  Ground glass opacity present on imaging of lung [R91 8]  Age/Sex: 31 y o  female  Admission Orders:  Scheduled Medications:  acetaminophen, 975 mg, Oral, Q8H [facility]  apixaban, 5 mg, Oral, BID  ARIPiprazole, 10 mg, Oral, Daily  cefazolin, 2,000 mg, Intravenous, Q8H  clonazePAM, 0 5 mg, Oral, BID  cloNIDine, 0 2 mg, Oral, Q12H JEFF  gabapentin, 300 mg, Oral, BID  methocarbamol, 750 mg, Oral, Q6H JEFF  nicotine, 1 patch, Transdermal, Daily  polyethylene glycol, 17 g, Oral, Daily  potassium chloride, 40 mEq, Oral, Once  potassium chloride, 40 mEq, Oral, Once  prazosin, 1 mg, Oral, HS  vancomycin, 15 mg/kg, Intravenous, Q12H      Continuous IV Infusions:  multi-electrolyte, 75 mL/hr, Intravenous, Continuous      PRN Meds:  aluminum-magnesium hydroxide-simethicone, 30 mL, Oral, Q6H PRN  docusate sodium, 100 mg, Oral, BID PRN  HYDROmorphone, 0 5 mg, Intravenous, Q3H PRN  ondansetron, 4 mg, Intravenous, Q6H PRN  oxyCODONE, 10 mg, Oral, Q4H PRN  oxyCODONE, 15 mg, Oral, Q4H PRN      Daily weight / I&O  IP CONSULT TO INFECTIOUS DISEASES  IP CONSULT TO CASE MANAGEMENT  IP CONSULT TO ACUTE PAIN SERVICE  IP CONSULT TO PHARMACY    Network Utilization Review Department  ATTENTION: Please call with any questions or concerns to [phone] and carefully listen to the prompts so that you are directed to the right person  All voicemails are confidential   [employer] all requests for admission clinical reviews, approved or denied determinations and any other requests to dedicated fax number below belonging to the campus where the patient is receiving treatment   List of dedicated fax numbers for the Facilities:  [address] DENIALS (Administrative/Medical Necessity) [phone]   [address] (Maternity/NICU/Pediatrics) [phone]   [address] [place] [address] [address] [place] [address] [address] [facility] [place] [address] [phone]

## 2021-07-12 NOTE — PROGRESS NOTES
Vancomycin IV Pharmacy-to-Dose Consultation    Lennox Avila is a 31 y o  female who is currently receiving Vancomycin IV with management by the Pharmacy Consult service  Relevant clinical data and objective history reviewed:  Temp Readings from Last 3 Encounters:   07/11/21 98 1 °F (36 7 °C) (Oral)   07/03/21 97 6 °F (36 4 °C) (Tympanic)     /77   Pulse 87   Temp 98 1 °F (36 7 °C) (Oral)   Resp 20   SpO2 99%     No intake/output data recorded  Lab Results   Component Value Date/Time    BUN 10 07/11/2021 06:05 PM    WBC 13 07 (H) 07/11/2021 06:05 PM    WBC 8 4 07/26/2016 12:00 AM    HGB 11 1 (L) 07/11/2021 06:05 PM    HGB 13 3 03/17/2017 10:19 AM    HCT 32 5 (L) 07/11/2021 06:05 PM    HCT 36 1 10/27/2016 11:35 AM    MCV 84 07/11/2021 06:05 PM    MCV 90 07/26/2016 12:00 AM     07/11/2021 06:05 PM     07/26/2016 12:00 AM     Creatinine   Date Value Ref Range Status   07/11/2021 0 52 (L) 0 60 - 1 30 mg/dL Final     Comment:     Standardized to IDMS reference method   07/04/2021 0 67 0 60 - 1 30 mg/dL Final     Comment:     Standardized to IDMS reference method     Vancomycin Tr   Date Value Ref Range Status   03/18/2021 26 5 (HH) 10 0 - 20 0 ug/mL Final   03/15/2021 23 4 (HH) 10 0 - 20 0 ug/mL Final         Vancomycin Assessment:  Indication: endocarditis    Renal Function: Scr 0 52; CrCl >100 ml/min  Days of Therapy: 1  Current Dose: 1000 mg q12h (received 1000 mg in ED this morning)  Goal Trough: 15-20 (appropriate for most indications)   Goal AUC(24h): 400-600      Vancomycin Plan:  New Dosing: give a one time 500 mg dose now to make a total loading dose of 1500 mg (25 mg/kg) then change to 1250 mg q8h   Predicted Trough / AUC: 15 / 545  Next Level: 7/13 @ 1230      Pharmacy will continue to follow closely for s/sx of nephrotoxicity, infusion reactions and appropriateness of therapy  BMP and CBC will be ordered per protocol   We will continue to follow the patient's culture results and clinical progress daily         Eros Johsnon, PharmD  Pharmacist

## 2021-07-12 NOTE — ASSESSMENT & PLAN NOTE
Patient being kept as CT scan reveals the possibility of multiple septic embolism  Blood cultures taken  Started on Rocephin with vancomycin  Infectious disease consult  Continue Eliquis 5 mg twice daily  Currently patient despite complaints of pain has good oxygenation 98% on room air  Likewise, patient does not demonstrate any cardiorespiratory distress

## 2021-07-12 NOTE — CONSULTS
Consultation - Infectious Disease   Teodora Stuart 31 y o  female MRN: [id number]  Unit/Bed#: -01 Encounter: [id number]      IMPRESSION & RECOMMENDATIONS:   Impression/Recommendations: This is a 31 y o  female, active IVDU, with history of MSSA bacteremia with TV endocarditis, status post TV repair and annuloplasty, has had multiple hospitalization which she left AMA, came to ER yesterday with diffuse pain, worse in hands/feet and lower back  Patient has evidence of bilateral feet cellulitis  1  Bilateral feet cellulitis, most likely secondary to active IVDU  Cellulitis is superficial, without purulence drainage done without clinical signs of involvement of deeper structures  Patient has history of MSSA infection previously  For now, will keep patient on narrow spectrum antibiotic for likely MSSA cellulitis  If she does not respond to narrow spectrum antibiotic, will consider broaden antibiotic regimen at that time  Patient is systemically well, without clinical signs of sepsis or systemic toxicity  Admission blood culture has no growth thus far  Deescalate antibiotic regimen to IV cefazolin  Serial feet exams  Monitor temperature/WBC  Follow-up on pending blood culture  2  Low back pain  If patient has vertebral osteomyelitis, it would most likely be secondary to hematogenous spread from IVDU  As long as patient is not bacteremic, I would not pursue workup for vertebral osteomyelitis  Monitor low back pain for now  Pain control per primary service  3  Abnormal lower lobes of lungs on abdomen/pelvis CT  CT showed small foci of ground-glass opacities, without cavitation or consolidation  This may be all secondary to aspiration from loss of consciousness from IV drug intoxication  Patient has no respiratory symptoms  She has no hypoxia  No antibiotic needed for this for now  Monitor respiratory symptoms  Monitor O2 saturation      4  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  Patient is status post TV repair with annuloplasty  If patient is bacteremic, we need to consider the possibility of recurrent endocarditis  Follow-up on admission blood culture  5  Active IVDU, with noncompliance to care and with multiple episodes of leaving AMA  Patient is not a candidate for home IV antibiotic, if she needs prolonged IV antibiotic treatment course  Patient is at risk for drug withdrawal     Previous hospitalization records reviewed in detail  Discussed with patient in detail regarding the above plan  Discussed slim service  Thank you for this consultation  We will follow along with you  HISTORY OF PRESENT ILLNESS:  Reason for Consult:  Bilateral feet cellulitis  HPI: Dontae Vegas is a 31 y o  female, active IVDU, status post prolonged hospitalization in August 2020 with MSSA bacteremia secondary to injection and TV endocarditis with right septic sacroiliitis  Patient underwent TV repair and annuloplasty  She completed 6 weeks of IV antibiotic as an inpatient, transition to p o  Keflex at discharge  Patient has been noncompliant with her p o  Keflex  Since then, patient has had multiple admissions secondary to drug withdrawal and cellulitis from IVDU  She has left the hospital AMA multiple times  Patient came to the ER last night with pain all over    She states she has pain in bilateral feet and hands, at recent injection sites, in neck, and recent injection site with imbedded needle fragment, and low back pain  REVIEW OF SYSTEMS:  A complete system-based review was done  Except for what is noted in HPI above, ROS of systems is otherwise negative      PAST MEDICAL HISTORY:  Past Medical History:   Diagnosis Date    Abnormal Pap smear of cervix     Anxiety     Depression     Endocarditis     2018    Hepatitis C     HPV (human papilloma virus) anogenital infection      Past Surgical History:   Procedure Laterality Date    IR PICC PLACEMENT DOUBLE LUMEN  2020    KNEE SURGERY Left     MOUTH SURGERY      DE REPLACE TRICUSPID W CP BYPASS N/A 9/10/2020    Procedure: REPAIR VALVE TRICUSPID with [employer]onic 30mm 3D Contour  Annuloplasty Ring;  Surgeon: Dorina Carpio MD;  Location: [facility];  Service: Cardiac Surgery    TOOTH EXTRACTION N/A 2020    Procedure: EXTRACTION TOOTH 32;  Surgeon: Nakul Hernandez DMD;  Location: [facility];  Service: Maxillofacial     Problem list reviewed  FAMILY HISTORY:  Non-contributory    SOCIAL HISTORY:  Social History     Substance and Sexual Activity   Alcohol Use Not Currently    Alcohol/week: 0 0 standard drinks     Social History     Substance and Sexual Activity   Drug Use Yes    Types: Cocaine, Heroin    Comment: injected cocaine last night  Social History     Tobacco Use   Smoking Status Current Every Day Smoker    Packs/day: 0 50    Types: Cigarettes    Last attempt to quit: 2016    Years since quittin 6   Smokeless Tobacco Never Used       ALLERGIES:  Allergies   Allergen Reactions    Cat Hair Extract Itching    Dog Epithelium     Latex     Pollen Extract        MEDICATIONS:  All current active medications have been reviewed  Patient is currently on IV vancomycin/ceftriaxone  PHYSICAL EXAM:  Vitals:  Temp:  [98 1 °F (36 7 °C)] 98 1 °F (36 7 °C)  HR:  [68-89] 87  Resp:  [16-30] 20  BP: ()/() 107/57  SpO2:  [97 %-99 %] 99 %  Temp (24hrs), Av 1 °F (36 7 °C), Min:98 1 °F (36 7 °C), Max:98 1 °F (36 7 °C)  Current: Temperature: 98 1 °F (36 7 °C)     Physical Exam:  General:  Well-nourished, well-developed, the subjective pain but in no acute distress  Lethargic, sleepy, arousable but falls back to sleep easily    Eyes:  Conjunctive clear with no hemorrhages or effusions  Oropharynx:  No ulcers, no lesions, pharynx benign, no tonsillitis  Neck:  Supple, no lymphadenopathy, no mass, nontender  Lungs:  Expansion symmetric, no rales, no wheezing, no accessory muscle use  Cardiac:  Regular rate and rhythm, normal S1, normal S2, II/VI EDITA  Abdomen:  Soft, nondistended, non-tender, no HSM  Back:  Mild diffuse upper, mid and lower back tenderness  No ulcer  No erythema  No deformity  Extremities:  R>L foot edema, erythema, warmth, with moderate tenderness  No fluctuance  No drainage at injection site  Neurological:  Moves all four extremities spontaneously, sensation grossly intact    LABS, IMAGING, & OTHER STUDIES:  Lab Results:  I have personally reviewed pertinent labs  Results from last 7 days   Lab Units 07/11/21  1805   POTASSIUM mmol/L 3 0*   CHLORIDE mmol/L 100   CO2 mmol/L 27   BUN mg/dL 10   CREATININE mg/dL 0 52*   EGFR ml/min/1 73sq m 130   CALCIUM mg/dL 8 5   AST U/L 42   ALT U/L 56   ALK PHOS U/L 110     Results from last 7 days   Lab Units 07/11/21  1805   WBC Thousand/uL 13 07*   HEMOGLOBIN g/dL 11 1*   PLATELETS Thousands/uL 299     Results from last 7 days   Lab Units 07/11/21  1805   BLOOD CULTURE  Received in Microbiology Lab  Culture in Progress  Imaging Studies:   I have personally reviewed pertinent imaging study reports and images in PACS  Abdomen/pelvis CT reviewed personally  Small bibasilar ground-glass opacities  No intra-abdominal pathology  Head CT reviewed personally  No acute changes  EKG, Pathology, and Other Studies:   I have personally reviewed pertinent reports

## 2021-07-12 NOTE — SEPSIS NOTE
Sepsis Note   Ana Landry 31 y o  female MRN: [id number]  Unit/Bed#: ED 02 Encounter: [id number]      qSOFA     [address] Name 07/12/21 0215 07/11/21 2300 07/11/21 2245 07/11/21 2230 07/11/21 2215    Altered mental status GCS < 15  --  0  1  1  1    Respiratory Rate > / =22  0  1  0  0  0    Systolic BP < / =701  0  --  0  1  1    Q Sofa Score  0  1  1  2  2    Row Name 07/11/21 2203 07/11/21 1818 07/11/21 1551          Altered mental status GCS < 15  1  --  --      Respiratory Rate > / =22  0  1  1      Systolic BP < / =325  1  0  0      Q Sofa Score  2  1  1          Initial Sepsis Screening     Row Name 07/12/21 0419                Is the patient's history suggestive of a new or worsening infection? (!) Yes (Proceed)  -CL        Suspected source of infection  pneumonia  -CL        Are two or more of the following signs & symptoms of infection both present and new to the patient? (!) Yes (Proceed)  -CL        Indicate SIRS criteria  Tachypnea > 20 resp per min;Leukocytosis (WBC > [address] IJL)  -CL        If the answer is yes to both questions, suspicion of sepsis is present  --        If severe sepsis is present AND tissue hypoperfusion perists in the hour after fluid resuscitation or lactate > 4, the patient meets criteria for SEPTIC SHOCK  --        Are any of the following organ dysfunction criteria present within 6 hours of suspected infection and SIRS criteria that are NOT considered to be chronic conditions?   No  -CL        Organ dysfunction  --        Date of presentation of severe sepsis  --        Time of presentation of severe sepsis  --        Tissue hypoperfusion persists in the hour after crystalloid fluid administration, evidenced, by either:  --        Was hypotension present within one hour of the conclusion of crystalloid fluid administration?  --        Date of presentation of septic shock  --        Time of presentation of septic shock  --          User Key  (r) = Recorded By, (t) = Taken By, (c) = Cosigned By    [address] Name Provider Praveena Arguello MD Resident

## 2021-07-12 NOTE — ASSESSMENT & PLAN NOTE
Patient has redness of bilateral lower extremities specially the toes of the right foot; patient is already on Rocephin with vancomycin

## 2021-07-12 NOTE — ASSESSMENT & PLAN NOTE
· Patient admits to recent intravenous cocaine and fentanyl use  · Patient currently very agitated and anxious    Requesting to speak with pain management, specifically Nallely Beckford who she has dealt with in the past   · Monitor closely for withdrawal   Started on clonidine 0 2 b i d   · HOST referral

## 2021-07-12 NOTE — ED NOTES
Admitting physician at bedside for patient evaluation  Patient frustrated with the plan of care and states "the only thing that would help right now is if I walk outside and jump in front of a car"  Patient continuing to yell while physician speaking with her  Patient stating "I need to see Ashwin Yuan, he really helped me"  Patient notified that an acute pain mgmt consult would be placed for her    Patient remains frustrated and yells "why is no one helping me"     Bennie Horowitz RN  07/12/21 5970

## 2021-07-12 NOTE — ED CARE HANDOFF
Emergency Department Sign Out Note        Sign out and transfer of care from Dr Joi Greene  See Separate Emergency Department note  The patient, Lennox Avila, was evaluated by the previous provider for flank pain  Workup Completed:  CT abdomen pelvis, lab work    ED Course / Workup Pending (followup):  See ED course                                  ED Course as of Jul 12 0525   Sun Jul 11, 2021   2310 SO  30F  Hx IVDU  HepC  Tricuspid valve repair  Sudden right flank pain  Pain and redness in hands and feet s/p injection  Neck injection as well with cocaine  IM toradol and versed given  IM morphine  Non-focal exam  Ketamine also given  CT refused  Plan for DC/AMA if patient continues to refuse workup      Mon Jul 12, 2021   0157 Patient now willing to undergo CT imaging       0405 CT Final Read: Small scattered peripheral groundglass opacities within bilateral lung bases may be due to pneumonia, septic emboli in the setting of IV drug use, or Covid-19 infection  Will draw remainder of the sepsis panel  Will initiate antibiotics and analgesia  Will test for covid      5356 Patient updated on the CT finding and plan   She is agreeable for admission      0425 Patient [facility] be admitted to [facility]        Procedures  MDM  Number of Diagnoses or Management Options  Flank pain  Ground glass opacity present on imaging of lung  Hypokalemia  Leukocytosis  Sepsis ([address] )  Substance abuse ([address] )  Diagnosis management comments: See ED course      Disposition  Final diagnoses:   Flank pain   Hypokalemia   Leukocytosis   Substance abuse ([address] )   Ground glass opacity present on imaging of lung - Pneumonia vs septic emboli vs COVID   Sepsis ([address] )   Murmur, cardiac     Time reflects when diagnosis was documented in both MDM as applicable and the Disposition within this note     Time User Action Codes Description Comment    7/11/2021 11:17 PM Schisselbauer, Billie Mortimer Add [R10 9] Flank pain     7/12/2021  1:58 AM Baldev Chuloonawick Candle A Add [E87 6] Hypokalemia     7/12/2021  1:58 AM Reji Micheal A Add [D72 829] Leukocytosis     7/12/2021  4:24 AM Legare, Chul[place] Candle A Add [F19 10] Substance abuse ([facility] )     7/12/2021  4:24 AM Legare, Chuloonawick Candle A Add [R91 8] Ground glass opacity present on imaging of lung     7/12/2021  4:24 AM Legare, Chuloon[place] Candle A Modify [R91 8] Ground glass opacity present on imaging of lung Pneumonia vs septic emboli vs COVID    7/12/2021  4:32 AM Legare, Chuloon[place]ick Candle A Add [A41 9] Sepsis ([facility] )     7/12/2021  4:32 AM Reji Micheal A Modify [R10 9] Flank pain     7/12/2021  4:32 AM Reji Micheal A Modify [A41 9] Sepsis ([facility] )     7/12/2021  4:35 AM Valeriy Jerry S Add [I76] Septic embolism ([facility] )     7/12/2021  4:42 AM Cathye Pee, [place] Candle A Add [R01 1] Murmur, cardiac     7/12/2021  5:00 AM Vesta Spring Add [F19 10] Polysubstance abuse ([facility] )     7/12/2021  5:00 AM Vesta Spring Add [F11 10] Heroin abuse [facility])       ED Disposition     ED Disposition Condition Date/Time Comment    Admit Stable Mon Jul 12, 2021  4:24 AM Case was discussed with CATHY and the patient's admission status was agreed to be to the service of Dr Bhavna Colvin  Follow-up Information    None       Patient's Medications   Discharge Prescriptions    No medications on file     No discharge procedures on file         ED Provider  Electronically Signed by     Gema Ocasio MD  07/12/21 5396

## 2021-07-12 NOTE — CASE MANAGEMENT
LOS 1  Not a bundle  Not a readmission  Unplanned Readmission Risk Score: 61    CM attempted to meet with pt at bedside numerous times this AM -- Once at 9am, another at 10am, and finally at 12:30pm  Pt slept through all CM's attempts at assessment -- She was not responsive to voice or touch at 9 or 10am, but was finally responsive at 12:30pm, stating "Come back later " CM asked for permission to call pt's mother/emergency contact  Pt asked, "why?" CM replied, "I'd just like to gather some information to best plan for your discharge " Pt presented as alarmed, stating "I'm going home?" CM replied, "Not at this time, but we like to begin discussing our plan for discharge as soon as possible " Pt began nodding off and was once again unresponsive to CM's voice, eyes closed -- CM asked once again for permission to contact mom  Pt opened eyes and stated, "No, don't call her " Pt asked CM to leave  Pt not medically cleared for discharge at this time; CM will await d/c in >72 hours  CM will attempt CM open again this evening

## 2021-07-12 NOTE — ASSESSMENT & PLAN NOTE
Currently, patient is unable to converse with examiner and is uncooperative  She is obsessed with her pain and states that  they have not given me anything that works and that she is in terrible amount of pain  She also states that Bryce did not do this to me last time in [facility]    Although patient is able to regard examiner; she is not interested in having a conversation of her current concerns  She is only concerned about taking care of her pain  She is also threatening to leave  Patient seems to be withdrawing? Will start clonidine 0 2 mg twice daily  Meanwhile for pain; patient is on oxycodone 5 mg for moderate pain, 10 mg for severe pain and Dilaudid 0 5 mg for breakthrough pain as per pain protocol  Will also place acute pain management to see patient; patient mentions that she used to work with a certain person whose name is Stephon  She cannot remember Stephon's last name  Please note that this exchange with the patient to place in the presence of an ER nurse as witness and done while she was on the phone with her mom on the line  I was able to speak to the patient and even explain to the mother regarding current plans over the phone  Mother agrees that we need acute pain management on board

## 2021-07-12 NOTE — NURSING NOTE
RN attempted to draw blood cultures; patient refusing at this time stating that we need to use ultrasound and she wants to wake up more and eat dinner  PCA made aware and will try again later

## 2021-07-12 NOTE — ED ATTENDING ATTESTATION
7/11/2021  ILeonard DO, saw and evaluated the patient  I have discussed the patient with the resident/non-physician practitioner and agree with the resident's/non-physician practitioner's findings, Plan of Care, and MDM as documented in the resident's/non-physician practitioner's note, except where noted  All available labs and Radiology studies were reviewed  I was present for key portions of any procedure(s) performed by the resident/non-physician practitioner and I was immediately available to provide assistance  At this point I agree with the current assessment done in the Emergency Department  I have conducted an independent evaluation of this patient a history and physical is as follows: This is a 27-year-old female with a long history of polysubstance abuse status post tricuspid valve repair after endocarditis who presents for multiple complaints  The patient reports that she injected cocaine into her hands neck and feet yesterday  She has been going through a relapse recently  Was seen had multiple emergency departments for recently, this was for intoxication  Patient is essentially screaming while I am trying to take a history  She is cursing at me  Demanding pain medication  Is essentially half naked in the room throwing things  She complains mainly of right flank pain  Otherwise I cannot ascertain any kind of history from her  She will not even let me examine her  She does have track marks on her hands and feet  She has bruising all over her body  I informed her that I cannot begin to help her if she does not give me a history and at least sit down to allow me to evaluate her  She then became more aggressive and told me that I was the worst doctor that she ever had also that I was a crack head    Plan is to give medications for symptom control including Versed in Toradol and attempt to examine the patient and also establish IV access    ED Course  ED Course as of Jul 12 [facility] Jul 11, 2021   1631 Patient is essentially inconsolable and screaming at staff and in general   She received Versed and Toradol to help control her pain and at least calmer down off to get an ample history and exam   She continues to be uncooperative  I informed her that we need to make sure that nothing deadly is going on and this would require her to give us a better history and allow us to check labs and to inserted IV  She continues to scream   She is not psychotic in the sense that she can answer my questions at times however is extremely agitated and may be high on cocaine still        1800 Procedure Note - Ultrasound Guided Peripheral IV    Ultrasound dynamic guidance was used for peripheral line insertion  Risks and benefits of the procedure were discussed with the patient  Discussed risks included pain with the procedure, bleeding, and risk of infection  Indication: Difficult non-ultrasound guided peripheral intravenous line insertion  Location: Laterally: right upper extremity  Procedure: The patient was prepped using standard ultrasound guided IV procedures  Using direct visualization of the intravenous catheter/needle, the vessel was successfully cannulated with return of blood and advancement of the catheter  The catheter was secured in the standard technique  Complications: None  Interpretation: Successful ultrasound guided peripheral IV  This is a billable ultrasound guided procedure  2111 Patient is still refusing CT scan because we are not treating her pain patient has received two doses of Dilaudid, she has been anything but cooperative in the emergency department has been screaming at anybody walks into the room  2140 Patient refusing CT scan still ordered 0 3 milligrams/kilo in infusion over 15 minutes    Discussed with the nurse the infusion parameters and instructions        Ultimately the patient refused any further testing, she is not cooperating with our instructions she is demanding IV narcotic pain medication I informed her at this point she is refusing all care and therefore can be discharged, at this point she refused to talk to me and surgeon:  Her mother on the phone  I have offered the patient reasonable alternatives to narcotic pain medication, she still refuses any kind of imaging or further testing  She is competent in terms of making her own decisions currently she is not psychotic she understands the risks of not undergoing no further workup, it is very possible the patient could be bacteremic have septic emboli to her kidney, and a host of other pathologies however the patient seems to be nonplussed about this      Critical Care Time  Procedures

## 2021-07-12 NOTE — ASSESSMENT & PLAN NOTE
· Bilateral great toe erythema/swelling and significant tenderness  · Will check x-ray for now however may need additional imaging    · Intravenous antibiotics with vancomycin/ceftriaxone  · ID consult  · Serial exams

## 2021-07-12 NOTE — ED NOTES
Pt refusing vital signs and monitoring after ketamine infusion  Pt just yelling out, "what you are doing is not working"  Pt then told this RN to get out of the room  Resident and Attending made aware       Annette Ribera RN  07/11/21 0134

## 2021-07-12 NOTE — ASSESSMENT & PLAN NOTE
· Patient presents to the ED with severe low back pain, worse pain she has ever had in her life, worse than childbirth and heart surgery  History difficult to obtain, patient requesting multiple narcotic medications and screaming during my encounter  No tingling, numbness or radiation of pain  No bowel or bladder incontinence  · CT abdomen and pelvis negative for acute abdominal pathology  · Given ongoing intravenous drug use, diskitis/osteomyelitis/abscess needs to be considered  · Will check MRI lumbar spine  · APS consult for pain management however for now will continue with ATC APAP,lido patch,  gabapentin, p r n  Oxycodone with intravenous Dilaudid for breakthrough

## 2021-07-12 NOTE — ED NOTES
Patient continues to refuse covid swab and vital signs    Stating "I just can't right now, I just can't"     Chanelle Giraldo RN  07/12/21 9588

## 2021-07-12 NOTE — CASE MANAGEMENT
CM attempted to complete assessment once again this evening -- Pt was sleeping, was not responsive to voice  Pt was not given permission to call family, therefore CM will await further responsiveness to discuss plan for discharge  Pt not medically stable at this time  CM will continue to be available for discharge planning needs

## 2021-07-12 NOTE — QUICK NOTE
Patient refusing MRI as she states she cannot remove her nipple piercings  Therefore, will check CT Lumbar spine         Olga Fam, [facility]

## 2021-07-12 NOTE — ED NOTES
MD Marcos Carver and Resident Brook Shields at bedside  Pt currently refusing all treatment   Pt yelling at MD and Resident to get out of room "I want a new Doctor"     Sonny Gupta, RN  07/12/21 Leopoldo Alvarado, RN  07/12/21 1028

## 2021-07-12 NOTE — CONSULTS
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    Septic embolism (HCC)    Hypokalemia    Bipolar 1 disorder ([place] [address] )    Right hip pain    Intravenous drug abuse ([place] [address] )    Known history of DVT of axillary vein, acute left (HCC)    Elevated troponin    Rash    Anemia    Chronic hepatitis C virus infection ([facility] )    Chronic, continuous use of opioids    Hyperphosphatemia    S/P TVR (tricuspid valve repair)    Chronic anticoagulation    GERD (gastroesophageal reflux disease)    Suspected UTI (urinary tract infection)    Sacroiliitis (HCC)    Cellulitis of neck    Generalized anxiety disorder    Left against medical advice    Right forearm cellulitis    Exposure to STD    Retained foreign body    Homelessness    BV (bacterial vaginosis)    Ecchymosis of neck    Bradycardia    Acute neck pain    Retained foreign body of neck    Thickening of wall of gallbladder and right upper quadrant tenderness    Abnormal CT of the chest    Neck swelling    Polysubstance abuse ([facility] )    AMS (altered mental status)    Pregnancy    Heroin abuse ([place] [address] )    Cellulitis of lower extremity    Low back pain    History of bacteremia      Kitty Herrera is a 31 y o  female  who presents on 7/12/21 for mental status changes due to heroin abuse & overall generalized pain  She unfortunately, has a long standing history of IV drug abuse particularly heroin and has been admitted to the hospital multiple times over the past couple of months & has hx of signing out AMA   This visit the patient was admitted due to concern for CT abd/pelvis findings of possible septic emboli & concern for discitis due to new onset of lower back pain  The patient has been very difficult to cooperate with and assess since admission  She required multiple doses of IV opioids, benzos & ketamine to have her CT scan  This morning she was very unruly and required IV valium to help calm her down  Upon my assessment this morning the patient was unable to keep her eyes open and stay awake  Physical exam was performed however, patient was unable to cooperate in providing adequate history or pain levels  Plan:   - Hold further sedatives until patient is able to participate in conversation - currently lethargic unable to stay awake for conversations s/p IV valium  -   Consider switching clonidine PO to patch form given inability to safely swallow & AMS to help minimize w/d   -  Will reassess patient once awake & oriented   - Current regimen includes;  Scheduled tylenol  mg q 8 hrs   Abilify PO 10 mg daily  Clonazepam PO 0 5 mg BID  Clonidine PO 0 2 mg BID   Neurontin  mg BID   Robaxin  mg q 6 hrs   PRN oxycodone 10 mg q4 hrs for moderate pain & PRN oxycodone 15 mg q 4 hrs   PRN dilaudid 0 5 mg q3 hrs for breakthrough pain     APS will continue to follow  Please contact Acute Pain Service - [facility] via [employer] from [phone] with additional questions or concerns  See [place] or Darvin for additional contacts and after hours information  History of Present Illness    Admit Date:  7/11/2021  Hospital Day:  0 days  Primary Service:  General Medicine  Attending Provider: Hazel Devries MD  Reason for Consult / Principal Problem: Generalized pain  HPI: Dennise Robles is a 31 y o  female  who presents on 7/12/21 for mental status changes due to heroin abuse & overall generalized pain   She unfortunately, has a long standing history of IV drug abuse particularly heroin and has been admitted to the hospital multiple times over the past couple of months & has hx of signing out AMA  This visit the patient was admitted due to concern for CT abd/pelvis findings of possible septic emboli & concern for discitis due to new onset of lower back pain  The patient has been very difficult to cooperate with and assess since admission  She required multiple doses of IV opioids, benzos & ketamine to have her CT scan  This morning she was very unruly and required IV valium to help calm her down  Upon my assessment this morning the patient was unable to keep her eyes open and stay awake  Physical exam was performed however, patient was unable to cooperate in providing adequate history or pain levels  Current pain location(s): Unable to assess   Pain Scale:   Unable to assess   Quality: n/a   Current Analgesic regimen:  IV dilaudid/IV valium     Pain History: Chronic/drug abuse   Pain Management Provider:  N/a     I have reviewed the patient's controlled substance dispensing history in the Prescription Drug Monitoring Program in compliance with the [facility] regulations before prescribing any controlled substances  Inpatient consult to Acute Pain Service  Consult performed by: BOO Tiwari  Consult ordered by:  Michell Little MD          Review of Systems   Unable to perform ROS: Mental status change       Historical Information   Past Medical History:   Diagnosis Date    Abnormal Pap smear of cervix     Anxiety     Depression     Endocarditis     2018    Hepatitis C     HPV (human papilloma virus) anogenital infection      Past Surgical History:   Procedure Laterality Date    IR PICC PLACEMENT DOUBLE LUMEN  8/26/2020    KNEE SURGERY Left     MOUTH SURGERY      MI REPLACE TRICUSPID W CP BYPASS N/A 9/10/2020    Procedure: REPAIR VALVE TRICUSPID with [employer] 30mm 3D Contour  Annuloplasty Ring;  Surgeon: Reyes Alvarenga MD;  [facility]: [facility];  Service: Cardiac Surgery    TOOTH EXTRACTION N/A 9/7/2020    Procedure: EXTRACTION TOOTH 32;  Surgeon: Sarah Colindres DMD;  Location: [facility];  Service: Maxillofacial     Social History   Social History     Substance and Sexual Activity   Alcohol Use Not Currently    Alcohol/week: 0 0 standard drinks     Social History     Substance and Sexual Activity   Drug Use Yes    Types: Cocaine, Heroin    Comment: injected cocaine last night  Social History     Tobacco Use   Smoking Status Current Every Day Smoker    Packs/day: 0 50    Types: Cigarettes    Last attempt to quit: 2016    Years since quittin 6   Smokeless Tobacco Never Used     Family History: non-contributory    Meds/Allergies   all current active meds have been reviewed    Allergies   Allergen Reactions    Cat Hair Extract Itching    Dog Epithelium     Latex     Pollen Extract        Objective   Temp:  [98 1 °F (36 7 °C)] 98 1 °F (36 7 °C)  HR:  [68-89] 87  Resp:  [16-30] 20  BP: ()/() 107/57  No intake or output data in the 24 hours ending 21 1438    Physical Exam  Constitutional:       General: She is not in acute distress  HENT:      Head: Normocephalic and atraumatic  Eyes:      General: Lids are normal       Pupils: Pupils are equal, round, and reactive to light  Cardiovascular:      Rate and Rhythm: Normal rate and regular rhythm  Pulses:           Radial pulses are 2+ on the right side and 2+ on the left side  Dorsalis pedis pulses are 1+ on the right side and 1+ on the left side  Pulmonary:      Effort: Pulmonary effort is normal       Breath sounds: Normal breath sounds  Abdominal:      General: Bowel sounds are normal       Palpations: Abdomen is soft  Tenderness: There is no abdominal tenderness  Musculoskeletal:         General: Normal range of motion  Cervical back: Normal range of motion  Right lower leg: Edema present  Left lower leg: Edema present  Skin:     General: Skin is warm and dry        Capillary Refill: Capillary refill takes less than 2 seconds  Comments: redness and swelling of both feet    Neurological:      Mental Status: She is lethargic  GCS: GCS eye subscore is 2  GCS verbal subscore is 3  GCS motor subscore is 5  Psychiatric:         Behavior: Behavior is uncooperative and slowed  Lab Results: I have personally reviewed pertinent labs  Imaging Studies: I have personally reviewed pertinent reports  EKG, Pathology, and Other Studies: I have personally reviewed pertinent reports  Counseling / Coordination of Care  Total floor / unit time spent today Level 1 = 20 minutes  Greater than 50% of total time was spent with the patient and / or family counseling and / or coordination of care  A description of the counseling / coordination of care: Unable to coordinate care with patient due to AMS  Please note that the APS provides consultative services regarding pain management only  With the exception of ketamine and epidural infusions and except when indicated, final decisions regarding starting or changing doses of analgesic medications are at the discretion of the consulting service  Off hours consultation and/or medication management is generally not available      BOO Navas  Acute Pain Service

## 2021-07-12 NOTE — ASSESSMENT & PLAN NOTE
· Continue Abilify 10 mg daily  · Klonopin 0 5 mg twice daily  · Prazosin 1 mg q h s  Zahira Lightning

## 2021-07-12 NOTE — PROGRESS NOTES
[facility]  Progress Note - Ross Garner 1990, 31 y o  female MRN: [id number]  Unit/Bed#: -01 Encounter: [id number]  Primary Care Provider: No primary care provider on file  Date and time admitted to hospital: 7/11/2021  3:46 PM    * Low back pain  Assessment & Plan  · Patient presents to the ED with severe low back pain, worse pain she has ever had in her life, worse than childbirth and heart surgery  History difficult to obtain, patient requesting multiple narcotic medications and screaming during my encounter  No tingling, numbness or radiation of pain  No bowel or bladder incontinence  · CT abdomen and pelvis negative for acute abdominal pathology  · Given ongoing intravenous drug use, diskitis/osteomyelitis/abscess needs to be considered  · Will check MRI lumbar spine  · APS consult for pain management however for now will continue with ATC APAP,lido patch,  gabapentin, p r n  Oxycodone with intravenous Dilaudid for breakthrough  Septic embolism (HCC)  Assessment & Plan  · Small scattered peripheral ground-glass opacities within the bilateral lung bases possibly due to pneumonia versus septic emboli in the setting of intravenous drug use  · Blood culture pending  · Infectious disease consult pending  · Intravenous antibiotics with ceftriaxone/vancomycin  · COVID-19 PCR negative  · No respiratory complaints and oxygen saturation adequate on room air  Cellulitis of lower extremity  Assessment & Plan  · Bilateral great toe erythema/swelling and significant tenderness  · Will check x-ray for now however may need additional imaging  · Intravenous antibiotics with vancomycin/ceftriaxone  · ID consult  · Serial exams    History of bacteremia  Assessment & Plan  · 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  Patient completed six week course of antibiotics at this time      Heroin abuse ([facility] )  Assessment & Plan  · Patient admits to recent intravenous cocaine and fentanyl use  · Patient currently very agitated and anxious  Requesting to speak with pain management, specifically Marita Munoz who she has dealt with in the past   · Monitor closely for withdrawal   Started on clonidine 0 2 b i d   · HOST referral     Chronic anticoagulation  Assessment & Plan  · Continue Eliquis 5 mg twice daily  Bipolar 1 disorder (HCC)  Assessment & Plan  · Continue Abilify 10 mg daily  · Klonopin 0 5 mg twice daily  · Prazosin 1 mg q h s  VTE Pharmacologic Prophylaxis:   Moderate Risk (Score 3-4) - Pharmacological DVT Prophylaxis Ordered: apixaban (Eliquis)  Patient Centered Rounds: I performed bedside rounds with nursing staff today  Discussions with Specialists or Other Care Team Provider: nursing, APS    Education and Discussions with Family / Patient: patient  Time Spent for Care: 30 minutes  More than 50% of total time spent on counseling and coordination of care as described above  Current Length of Stay: 0 day(s)  Current Patient Status: Inpatient   Certification Statement: The patient will continue to require additional inpatient hospital stay due to IV abx, ID consult, additional imaging  Discharge Plan: Anticipate discharge in >72 hrs to TBD    Code Status: Level 1 - Full Code    Subjective:   Patient complains of total body pain, worse pain she has ever had, worse than childbirth or heart surgery  States that her main complaint and reason for seeking ED treatment was low back pain which is stabbing in nature, does not radiate  Feels as though she has a abscess in her back  Admits to daily intravenous use of intravenous cocaine and fentanyl  Injects in various areas of her body but most recently in between her toes  She denies any respiratory symptoms      Objective:     Vitals:   Temp (24hrs), Av 1 °F (36 7 °C), Min:98 1 °F (36 7 °C), Max:98 1 °F (36 7 °C)    Temp:  [98 1 °F (36 7 °C)] 98 1 °F (36 7 °C)  HR:  [68-89] 87  Resp:  [16-30] 20  BP: ()/() 123/77  SpO2:  [97 %-99 %] 99 %  There is no height or weight on file to calculate BMI  Input and Output Summary (last 24 hours):   No intake or output data in the 24 hours ending 07/12/21 0908    Physical Exam:   Physical Exam  Vitals and nursing note reviewed  Constitutional:       Appearance: She is ill-appearing  Cardiovascular:      Rate and Rhythm: Normal rate  Pulmonary:      Breath sounds: Decreased breath sounds present  Abdominal:      Palpations: Abdomen is soft  Tenderness: There is no abdominal tenderness  Musculoskeletal:         General: Swelling (Bilateral feet) and tenderness (Low back) present  Skin:     General: Skin is warm  Comments: Generalized needle sticks/scabs  Bilateral great toe erythema/swelling and tenderness  Neurological:      Mental Status: She is alert and oriented to person, place, and time  Psychiatric:         Mood and Affect: Mood is anxious  Affect is labile            Additional Data:     Labs:  Results from last 7 days   Lab Units 07/11/21  1805   WBC Thousand/uL 13 07*   HEMOGLOBIN g/dL 11 1*   HEMATOCRIT % 32 5*   PLATELETS Thousands/uL 299   NEUTROS PCT % 73   LYMPHS PCT % 14   MONOS PCT % 11   EOS PCT % 1     Results from last 7 days   Lab Units 07/11/21  1805   SODIUM mmol/L 134*   POTASSIUM mmol/L 3 0*   CHLORIDE mmol/L 100   CO2 mmol/L 27   BUN mg/dL 10   CREATININE mg/dL 0 52*   ANION GAP mmol/L 7   CALCIUM mg/dL 8 5   ALBUMIN g/dL 3 1*   TOTAL BILIRUBIN mg/dL 0 78   ALK PHOS U/L 110   ALT U/L 56   AST U/L 42   GLUCOSE RANDOM mg/dL 92     Results from last 7 days   Lab Units 07/12/21  0430   INR  1 17             Results from last 7 days   Lab Units 07/12/21  0430 07/11/21  1805   LACTIC ACID mmol/L  --  1 2   PROCALCITONIN ng/ml 0 77*  --        Lines/Drains:  Invasive Devices     Peripheral Intravenous Line            Peripheral IV 07/11/21 Right;Upper;Medial Arm <1 day Imaging: Reviewed radiology reports from this admission including: abdominal/pelvic CT    Recent Cultures (last 7 days):   Results from last 7 days   Lab Units 07/11/21  1805   BLOOD CULTURE  Received in Microbiology Lab  Culture in Progress  Last 24 Hours Medication List:   Current Facility-Administered Medications   Medication Dose Route Frequency Provider Last Rate    acetaminophen  975 mg Oral Q8H [facility] BOO Kay      aluminum-magnesium hydroxide-simethicone  30 mL Oral Q6H PRN Jaelyn Soares MD      apixaban  5 mg Oral BID Jaelyn Soares MD      ARIPiprazole  10 mg Oral Daily Jaelyn Soares MD      [START ON 7/13/2021] cefTRIAXone  2,000 mg Intravenous Q24H Jaelyn Soares MD      clonazePAM  0 5 mg Oral BID Jaelyn Soares MD      cloNIDine  0 2 mg Oral Q12H [facility] Jaelyn Soares MD      docusate sodium  100 mg Oral BID PRN Jaelyn Soares MD      gabapentin  300 mg Oral BID Jaelyn Soares MD      HYDROmorphone  0 5 mg Intravenous Q3H PRN BOO Kay      methocarbamol  750 mg Oral Q6H [facility] BOO Kay      multi-electrolyte  1,000 mL Intravenous Once Jaelyn Soares MD      Followed by   Sammie Marte multi-electrolyte  1,000 mL Intravenous Once Jaelyn Soares MD      nicotine  1 patch Transdermal Daily Jaelyn Soares MD      ondansetron  4 mg Intravenous Q6H PRN Jaelyn Soares MD      oxyCODONE  10 mg Oral Q4H PRN BOO Reyes      oxyCODONE  15 mg Oral Q4H PRN BOO Reyes      polyethylene glycol  17 g Oral Daily BOO Kay      potassium chloride  40 mEq Oral Once Jaelyn Soares MD      prazosin  1 mg Oral HS Jaelyn Soares MD      vancomycin  1,250 mg Intravenous Q8H Jaelyn Soares MD      vancomycin  10 mg/kg Intravenous Once Inessa Middleton MD          Today, Patient Was Seen By: BOO Diaz    **Please Note: This note may have been constructed using a voice recognition system  **

## 2021-07-12 NOTE — H&P
Alem 43 1990, 31 y o  female MRN: [id number]  Unit/Bed#: ED 02 Encounter: [id number]  Primary Care Provider: No primary care provider on file  Date and time admitted to hospital: 7/11/2021  3:46 PM    * Septic embolism [facility])  Assessment & Plan  Patient being kept as CT scan reveals the possibility of multiple septic embolism  Blood cultures taken  Started on Rocephin with vancomycin  Infectious disease consult  Continue Eliquis 5 mg twice daily  Currently patient despite complaints of pain has good oxygenation 98% on room air  Likewise, patient does not demonstrate any cardiorespiratory distress  Chronic anticoagulation  Assessment & Plan  Continue Eliquis 5 mg twice daily  Bipolar 1 disorder (HCC)  Assessment & Plan  Continue Abilify 10 mg daily  Klonopin 0 5 mg twice daily  Prazosin 1 mg q h s       Cellulitis of lower extremity  Assessment & Plan  Patient has redness of bilateral lower extremities specially the toes of the right foot; patient is already on Rocephin with vancomycin  Heroin abuse [facility])  Assessment & Plan  Currently, patient is unable to converse with examiner and is uncooperative  She is obsessed with her pain and states that  they have not given me anything that works and that she is in terrible amount of pain  She also states that [facility] did not do this to me last time in [facility]    Although patient is able to regard examiner; she is not interested in having a conversation of her current concerns  She is only concerned about taking care of her pain  She is also threatening to leave  Patient seems to be withdrawing? Will start clonidine 0 2 mg twice daily  Meanwhile for pain; patient is on oxycodone 5 mg for moderate pain, 10 mg for severe pain and Dilaudid 0 5 mg for breakthrough pain as per pain protocol    Will also place acute pain management to see patient; patient mentions that she used to work with a certain person whose name is Stephon  She cannot remember Stephon's last name  Please note that this exchange with the patient to place in the presence of an ER nurse as witness and done while she was on the phone with her mom on the line  I was able to speak to the patient and even explain to the mother regarding current plans over the phone  Mother agrees that we need acute pain management on board  VTE Prophylaxis: Apixaban (Eliquis)  / reason for no mechanical VTE prophylaxis Bilateral cellulitis   Code Status: Prior full Code   POLST: There is no POLST form on file for this patient (pre-hospital)    Anticipated Length of Stay:  Patient will be admitted on an Inpatient basis with an anticipated length of stay of  greater than 2 midnights  Justification for Hospital Stay: Please see detailed plans noted above  Chief Complaint:     Overall body lip pain especially the back  History of Present Illness:  Little Roman is a 31 y o  female who has past medical history significant for heroin abuse and in fact over the past month has been abusing heroin injecting the substance using veins of her foot or arms  Patient also has multiple visits in emergency rooms across to [address] including [address], [address] with regards to change in mental status most likely secondary to heroin abuse  Also over the past month she was admitted due to ecchymosis of the neck secondary to retained needle fragment  This month alone she was seen twice due to change in mental status secondary to polysubstance abuse  The patient was seen since late afternoon of yesterday due to complaints of generalized pain involving her hands feet as well as neck  Patient likewise reports severe back pain at the flank  Noted is that the patient in late  had an elective  due to pregnancy    As of the moment, the patient is not interested in giving any more information Re with regards to heroin abuse however she has been injecting multiple sites of her arms and lower extremities  Of note is that the patient is shouting in the room and is not interested in being examined and in fact has been also a difficulty in being examined by the ER attending earlier last night as well as by the rest of the ER staff  Likewise, she first refused getting the CT scan of the chest abdomen and pelvis because of her excruciating body pains  Of note is that the patient received multiple doses of narcotic medications including hydromorphone 0 2 mg x 2, ketamine 90 mg, Toradol 15 mg x 1, my diazepam 4 mg x 1, morphine 4 mg x 1  Despite all those medications being given as a premedication for her CT scan she refused to be examined and a CT scan taken until she finally went at about 3:00 a m  this morning  That CT scan was finally read as having possibility of pneumonia or even septic emboli in the setting of IV drug use in the bilateral lung bases  That said, the patient was then started on Rocephin with vancomycin  Blood culture and other blood work was taken with difficulty and was given submitted early last night  As of the moment, the patient is screaming in the room and howliing that she is in pain and begging for more pain medications  She was also seen talking to her mother over the cell phone using the speaker function  In that setting, I was able to clarify to both patient and her mother that I am willing to give the prescribed pain medications as per the adult pain management protocol  Likewise, she may be withdrawing that time starting clonidine 0 2 mg twice daily  Pain management is consulted but other than that, she will not receive anything else without the advise of pain management  Review of Systems:  Review of systems very limited as she is very uncooperative    Constitutional:  Denies fever or chills   Eyes:  Denies change in visual acuity   HENT:  Denies nasal congestion or sore throat   Respiratory:  Denies cough or shortness of breath   Cardiovascular:  Denies chest pain or edema   GI:  Denies abdominal pain, nausea, vomiting, bloody stools or diarrhea   :  Denies dysuria   Musculoskeletal:  She claims to have overall pain at the neck, back, bilateral arms and legs  Integument:  Denies rash   Neurologic:  Denies headache, focal weakness or sensory changes   Endocrine:  Denies polyuria or polydipsia   Lymphatic:  Denies swollen glands   Psychiatric:  Denies depression or anxiety     Past Medical and Surgical History:   Past Medical History:   Diagnosis Date    Abnormal Pap smear of cervix     Anxiety     Depression     Endocarditis     2018    Hepatitis C     HPV (human papilloma virus) anogenital infection      Past Surgical History:   Procedure Laterality Date    IR PICC PLACEMENT DOUBLE LUMEN  8/26/2020    KNEE SURGERY Left     MOUTH SURGERY      TN REPLACE TRICUSPID W CP BYPASS N/A 9/10/2020    Procedure: REPAIR VALVE TRICUSPID with Medtronic 30mm 3D Contour  Annuloplasty Ring;  Surgeon: Michaela Lefort, MD;  [facility]: [facility];  Service: Cardiac Surgery    TOOTH EXTRACTION N/A 9/7/2020    Procedure: EXTRACTION TOOTH 32;  Surgeon: Willette Kehr, DMD;  Location: [facility];  Service: Maxillofacial       Meds/Allergies:  (Not in a hospital admission)    apixaban (ELIQUIS) 5 mg Take 2 tablets (10 mg total) by mouth 2 (two) times a day for 7 days, THEN 1 tablet (5 mg total) 2 (two) times a day for 23 days   Kvng Sheldon MD Reordered   Ordered as: apixaban (ELIQUIS) tablet 5 mg - 5 mg, Oral, 2 times daily, First dose on Mon 7/12/21 at 0900 High alert medication Indication: venous thromboembolism   ARIPiprazole (ABILIFY) 10 mg tablet Take 10 mg by mouth daily Dosage unknown Kvng Sheldon MD Reordered   Ordered as: ARIPiprazole (ABILIFY) tablet 10 mg - 10 mg, Oral, Daily, First dose on Mon 7/12/21 at 0900   clonazePAM (KlonoPIN) 0 5 mg tablet Take 1 tablet (0 5 mg total) by mouth 2 (two) times a day for 3 days Derrick Benites MD Reordered   Ordered as: clonazePAM (KlonoPIN) tablet 0 5 mg - 0 5 mg, Oral, 2 times daily, First dose on 21 at 0900 High alert medication  LOOK ALIKE SOUND ALIKE MED    gabapentin (NEURONTIN) 300 mg capsule Take 1 capsule (300 mg total) by mouth 2 (two) times a day for 7 days Derrick Benites MD Reordered   Ordered as: gabapentin (NEURONTIN) capsule 300 mg - 300 mg, Oral, 2 times daily, First dose on 21 at 0900 LOOK ALIKE SOUND ALIKE MED    prazosin (MINIPRESS) 1 mg capsule Take 1 capsule (1 mg total) by mouth daily at bedtime Derrick Benites MD Reordered   Ordered as: prazosin (MINIPRESS) capsule 1 mg - 1 mg, Oral, Daily at bedtime, First dose on 21 at 7397 Hold for systolic blood pressure less than (mmHg): 110         Allergies: Allergies   Allergen Reactions    Cat Hair Extract Itching    Dog Epithelium     Latex     Pollen Extract      History:  Marital Status: Single   Occupation:  Unknown  Patient Pre-hospital Living Situation:  Unknown  Patient Pre-hospital Level of Mobility:  Ambulatory  Patient Pre-hospital Diet Restrictions:  Unknown presumed regular  Substance Use History:   Social History     Substance and Sexual Activity   Alcohol Use Not Currently    Alcohol/week: 0 0 standard drinks     Social History     Tobacco Use   Smoking Status Current Every Day Smoker    Packs/day: 0 50    Types: Cigarettes    Last attempt to quit: 2016    Years since quittin 6   Smokeless Tobacco Never Used     Social History     Substance and Sexual Activity   Drug Use Yes    Types: Cocaine, Heroin    Comment: injected cocaine last night  Family History:  History reviewed  No pertinent family history      Physical Exam:     Vitals:   Blood Pressure: (!) 109/49 (21 0215)  Pulse: 70 (21 0215)  Temperature: 98 1 °F (36 7 °C) (21)  Temp Source: Oral (21 1811)  Respirations: 20 (07/12/21 0215)  SpO2: 98 % (07/12/21 0215)    Constitutional:  Well developed, well nourished, screaming in room, no acute distress, non-toxic appearance without apparent cardiorespiratory distress  Eyes:  PERRL, conjunctiva normal   HENT:  Atraumatic patient would not allow close inspection Neck- normal range of motion, she can move her neck freely  Respiratory:  No respiratory distress, normal breath sounds, no rales, no wheezing   Cardiovascular:  Normal rate, normal rhythm, no murmurs, no gallops, no rubs   GI:  Soft, nondistended, normal bowel sounds, nontender, no organomegaly, no mass, no rebound, no guarding   :  No costovertebral angle tenderness   Musculoskeletal:  Patient complains of overall tenderness,  Integument:  Well hydrated, with note of bilateral feet erythema more so at the level of the ventral foot more on the right than left, erythema and tenderness bilaterally with swelling at the level of the ankles  Lymphatic:  No lymphadenopathy noted   Neurologic:  Alert &awake, communicative, CN 2-12 normal, with no focal signs can move all 4 extremities  Psychiatric:  Speech and behavior screaming and uncooperative and showing signs of possible withdrawal?      Lab Results: I have personally reviewed pertinent reports  Results from last 7 days   Lab Units 07/11/21  1805   WBC Thousand/uL 13 07*   HEMOGLOBIN g/dL 11 1*   HEMATOCRIT % 32 5*   PLATELETS Thousands/uL 299   NEUTROS PCT % 73   LYMPHS PCT % 14   MONOS PCT % 11   EOS PCT % 1     Results from last 7 days   Lab Units 07/11/21  1805   POTASSIUM mmol/L 3 0*   CHLORIDE mmol/L 100   CO2 mmol/L 27   BUN mg/dL 10   CREATININE mg/dL 0 52*   CALCIUM mg/dL 8 5   ALK PHOS U/L 110   ALT U/L 56   AST U/L 42           Imaging: I have personally reviewed pertinent reports  CT head without contrast    Result Date: 6/26/2021  Narrative: CT BRAIN - WITHOUT CONTRAST INDICATION:   Altered mental status AMS   COMPARISON:  May 26, 2021 TECHNIQUE:  CT examination of the brain was performed  In addition to axial images, sagittal and coronal 2D reformatted images were created and submitted for interpretation  Radiation dose length product (DLP) for this visit:  948 mGy-cm   This examination, like all CT scans performed in the [facility], was performed utilizing techniques to minimize radiation dose exposure, including the use of iterative reconstruction and automated exposure control  IMAGE QUALITY:  Diagnostic  FINDINGS: PARENCHYMA:  No intracranial mass, mass effect or midline shift  No CT signs of acute infarction  No acute parenchymal hemorrhage  VENTRICLES AND EXTRA-AXIAL SPACES:  Normal for the patient's age  VISUALIZED ORBITS AND PARANASAL SINUSES:  Mild mucosal thickening seen in both maxillary sinus related to chronic sinus disease CALVARIUM AND EXTRACRANIAL SOFT TISSUES:  Normal      Impression: No acute intracranial hemorrhage seen No extra-axial collection seen No mass effect or midline shift seen Workstation performed: [id number]     CT abdomen pelvis with contrast    Result Date: 7/12/2021  Narrative: CT ABDOMEN AND PELVIS WITH IV CONTRAST INDICATION:   Abdominal pain, acute, nonlocalized left flank/back pain, hx IVDU  COMPARISON: CT of the chest on May 26, 2021  CT of the abdomen pelvis on August 26, 2020  TECHNIQUE:  CT examination of the abdomen and pelvis was performed  Axial, sagittal, and coronal 2D reformatted images were created from the source data and submitted for interpretation  Radiation dose length product (DLP) for this visit:  446 23 mGy-cm   This examination, like all CT scans performed in the [facility], was performed utilizing techniques to minimize radiation dose exposure, including the use of iterative  reconstruction and automated exposure control  IV Contrast:  100 mL of iohexol (OMNIPAQUE)  was administered intravenously without immediate adverse reaction   Enteric Contrast:  Enteric contrast was not administered  FINDINGS: ABDOMEN LOWER CHEST:  There are scattered peripheral groundglass opacities within bilateral lung bases  Tricuspid valve replacement  LIVER/BILIARY TREE:  Unremarkable  GALLBLADDER:  No calcified gallstones  No pericholecystic inflammatory change  SPLEEN:  The spleen is enlarged measuring 14 4 cm in AP dimension  PANCREAS:  Unremarkable  ADRENAL GLANDS:  Unremarkable  KIDNEYS/URETERS:  Unremarkable  No hydronephrosis  STOMACH AND BOWEL:  Unremarkable  APPENDIX:  No findings to suggest appendicitis  ABDOMINOPELVIC CAVITY:  Small amount of pelvic free fluid  No pneumoperitoneum  No lymphadenopathy  VESSELS:  Unremarkable for patient's age  PELVIS REPRODUCTIVE ORGANS:  Unremarkable for patient's age  URINARY BLADDER:  Unremarkable  ABDOMINAL WALL/INGUINAL REGIONS:  Unremarkable  OSSEOUS STRUCTURES:  No acute fracture or destructive osseous lesion  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 for immediate notification  Workstation performed: [id number]         ** Please Note: Dragon 360 Dictation voice to text software was used in the creation of this document   **

## 2021-07-13 ENCOUNTER — APPOINTMENT (INPATIENT)
Dept: NON INVASIVE DIAGNOSTICS | Facility: HOSPITAL | Age: 31
DRG: 710 | End: 2021-07-13
Payer: COMMERCIAL

## 2021-07-13 ENCOUNTER — APPOINTMENT (INPATIENT)
Dept: RADIOLOGY | Facility: HOSPITAL | Age: 31
DRG: 710 | End: 2021-07-13
Payer: COMMERCIAL

## 2021-07-13 PROBLEM — R78.81 GRAM-POSITIVE BACTEREMIA: Status: ACTIVE | Noted: 2021-07-12

## 2021-07-13 PROBLEM — Z86.79 HISTORY OF ENDOCARDITIS: Status: ACTIVE | Noted: 2021-07-13

## 2021-07-13 LAB
ALBUMIN SERPL BCP-MCNC: 2.2 G/DL (ref 3.5–5)
ALP SERPL-CCNC: 114 U/L (ref 46–116)
ALT SERPL W P-5'-P-CCNC: 32 U/L (ref 12–78)
ANION GAP SERPL CALCULATED.3IONS-SCNC: 7 MMOL/L (ref 4–13)
AST SERPL W P-5'-P-CCNC: 14 U/L (ref 5–45)
BACTERIA UR CULT: NORMAL
BASOPHILS # BLD AUTO: 0.01 THOUSANDS/ΜL (ref 0–0.1)
BASOPHILS NFR BLD AUTO: 0 % (ref 0–1)
BILIRUB SERPL-MCNC: 0.44 MG/DL (ref 0.2–1)
BUN SERPL-MCNC: 4 MG/DL (ref 5–25)
CALCIUM ALBUM COR SERPL-MCNC: 9.1 MG/DL (ref 8.3–10.1)
CALCIUM SERPL-MCNC: 7.7 MG/DL (ref 8.3–10.1)
CHLORIDE SERPL-SCNC: 107 MMOL/L (ref 100–108)
CO2 SERPL-SCNC: 26 MMOL/L (ref 21–32)
CREAT SERPL-MCNC: 0.41 MG/DL (ref 0.6–1.3)
EOSINOPHIL # BLD AUTO: 0.03 THOUSAND/ΜL (ref 0–0.61)
EOSINOPHIL NFR BLD AUTO: 0 % (ref 0–6)
ERYTHROCYTE [DISTWIDTH] IN BLOOD BY AUTOMATED COUNT: 13.2 % (ref 11.6–15.1)
GFR SERPL CREATININE-BSD FRML MDRD: 141 ML/MIN/1.73SQ M
GLUCOSE SERPL-MCNC: 95 MG/DL (ref 65–140)
HCT VFR BLD AUTO: 30.8 % (ref 34.8–46.1)
HGB BLD-MCNC: 10.1 G/DL (ref 11.5–15.4)
IMM GRANULOCYTES # BLD AUTO: 0.1 THOUSAND/UL (ref 0–0.2)
IMM GRANULOCYTES NFR BLD AUTO: 1 % (ref 0–2)
LYMPHOCYTES # BLD AUTO: 0.64 THOUSANDS/ΜL (ref 0.6–4.47)
LYMPHOCYTES NFR BLD AUTO: 6 % (ref 14–44)
MAGNESIUM SERPL-MCNC: 2.4 MG/DL (ref 1.6–2.6)
MCH RBC QN AUTO: 28.5 PG (ref 26.8–34.3)
MCHC RBC AUTO-ENTMCNC: 32.8 G/DL (ref 31.4–37.4)
MCV RBC AUTO: 87 FL (ref 82–98)
MONOCYTES # BLD AUTO: 1.31 THOUSAND/ΜL (ref 0.17–1.22)
MONOCYTES NFR BLD AUTO: 13 % (ref 4–12)
NEUTROPHILS # BLD AUTO: 8.25 THOUSANDS/ΜL (ref 1.85–7.62)
NEUTS SEG NFR BLD AUTO: 80 % (ref 43–75)
NRBC BLD AUTO-RTO: 0 /100 WBCS
PHOSPHATE SERPL-MCNC: 2.3 MG/DL (ref 2.7–4.5)
PLATELET # BLD AUTO: 247 THOUSANDS/UL (ref 149–390)
PMV BLD AUTO: 9.2 FL (ref 8.9–12.7)
POTASSIUM SERPL-SCNC: 3.2 MMOL/L (ref 3.5–5.3)
PROCALCITONIN SERPL-MCNC: 7.79 NG/ML
PROT SERPL-MCNC: 5.7 G/DL (ref 6.4–8.2)
RBC # BLD AUTO: 3.55 MILLION/UL (ref 3.81–5.12)
SODIUM SERPL-SCNC: 140 MMOL/L (ref 136–145)
WBC # BLD AUTO: 10.34 THOUSAND/UL (ref 4.31–10.16)

## 2021-07-13 PROCEDURE — 80053 COMPREHEN METABOLIC PANEL: CPT | Performed by: INTERNAL MEDICINE

## 2021-07-13 PROCEDURE — 83735 ASSAY OF MAGNESIUM: CPT | Performed by: INTERNAL MEDICINE

## 2021-07-13 PROCEDURE — 84145 PROCALCITONIN (PCT): CPT | Performed by: NURSE PRACTITIONER

## 2021-07-13 PROCEDURE — NC001 PR NO CHARGE

## 2021-07-13 PROCEDURE — 99233 SBSQ HOSP IP/OBS HIGH 50: CPT | Performed by: PHYSICIAN ASSISTANT

## 2021-07-13 PROCEDURE — 99233 SBSQ HOSP IP/OBS HIGH 50: CPT | Performed by: INTERNAL MEDICINE

## 2021-07-13 PROCEDURE — 87040 BLOOD CULTURE FOR BACTERIA: CPT | Performed by: INTERNAL MEDICINE

## 2021-07-13 PROCEDURE — 84100 ASSAY OF PHOSPHORUS: CPT | Performed by: INTERNAL MEDICINE

## 2021-07-13 PROCEDURE — 99232 SBSQ HOSP IP/OBS MODERATE 35: CPT | Performed by: NURSE PRACTITIONER

## 2021-07-13 PROCEDURE — 85025 COMPLETE CBC W/AUTO DIFF WBC: CPT | Performed by: INTERNAL MEDICINE

## 2021-07-13 RX ORDER — KETOROLAC TROMETHAMINE 30 MG/ML
15 INJECTION, SOLUTION INTRAMUSCULAR; INTRAVENOUS EVERY 6 HOURS SCHEDULED
Status: DISPENSED | OUTPATIENT
Start: 2021-07-13 | End: 2021-07-15

## 2021-07-13 RX ORDER — CLONIDINE HYDROCHLORIDE 0.1 MG/1
0.1 TABLET ORAL EVERY 12 HOURS SCHEDULED
Status: DISCONTINUED | OUTPATIENT
Start: 2021-07-13 | End: 2021-07-18 | Stop reason: HOSPADM

## 2021-07-13 RX ORDER — GABAPENTIN 100 MG/1
200 CAPSULE ORAL 2 TIMES DAILY
Status: DISCONTINUED | OUTPATIENT
Start: 2021-07-13 | End: 2021-07-18 | Stop reason: HOSPADM

## 2021-07-13 RX ORDER — POTASSIUM CHLORIDE 20 MEQ/1
40 TABLET, EXTENDED RELEASE ORAL ONCE
Status: DISCONTINUED | OUTPATIENT
Start: 2021-07-13 | End: 2021-07-14

## 2021-07-13 RX ORDER — METHOCARBAMOL 750 MG/1
750 TABLET, FILM COATED ORAL EVERY 6 HOURS PRN
Status: DISCONTINUED | OUTPATIENT
Start: 2021-07-13 | End: 2021-07-18 | Stop reason: HOSPADM

## 2021-07-13 RX ADMIN — KETOROLAC TROMETHAMINE 15 MG: 30 INJECTION, SOLUTION INTRAMUSCULAR; INTRAVENOUS at 17:28

## 2021-07-13 RX ADMIN — METHOCARBAMOL TABLETS 750 MG: 750 TABLET, COATED ORAL at 03:11

## 2021-07-13 RX ADMIN — VANCOMYCIN HYDROCHLORIDE 1250 MG: 10 INJECTION, POWDER, LYOPHILIZED, FOR SOLUTION INTRAVENOUS at 00:26

## 2021-07-13 RX ADMIN — HYDROMORPHONE HYDROCHLORIDE 0.5 MG: 1 INJECTION, SOLUTION INTRAMUSCULAR; INTRAVENOUS; SUBCUTANEOUS at 03:17

## 2021-07-13 RX ADMIN — GABAPENTIN 300 MG: 300 CAPSULE ORAL at 11:00

## 2021-07-13 RX ADMIN — CEFAZOLIN SODIUM 2000 MG: 2 SOLUTION INTRAVENOUS at 13:01

## 2021-07-13 RX ADMIN — VANCOMYCIN HYDROCHLORIDE 1250 MG: 10 INJECTION, POWDER, LYOPHILIZED, FOR SOLUTION INTRAVENOUS at 10:59

## 2021-07-13 RX ADMIN — CEFAZOLIN SODIUM 2000 MG: 2 SOLUTION INTRAVENOUS at 20:35

## 2021-07-13 RX ADMIN — CEFAZOLIN SODIUM 2000 MG: 2 SOLUTION INTRAVENOUS at 03:41

## 2021-07-13 RX ADMIN — VANCOMYCIN HYDROCHLORIDE 1250 MG: 10 INJECTION, POWDER, LYOPHILIZED, FOR SOLUTION INTRAVENOUS at 18:36

## 2021-07-13 RX ADMIN — SODIUM CHLORIDE, SODIUM GLUCONATE, SODIUM ACETATE, POTASSIUM CHLORIDE, MAGNESIUM CHLORIDE, SODIUM PHOSPHATE, DIBASIC, AND POTASSIUM PHOSPHATE 75 ML/HR: .53; .5; .37; .037; .03; .012; .00082 INJECTION, SOLUTION INTRAVENOUS at 05:20

## 2021-07-13 RX ADMIN — CLONAZEPAM 0.5 MG: 0.5 TABLET ORAL at 17:27

## 2021-07-13 RX ADMIN — GABAPENTIN 200 MG: 100 CAPSULE ORAL at 17:27

## 2021-07-13 RX ADMIN — CLONIDINE HYDROCHLORIDE 0.2 MG: 0.2 TABLET ORAL at 11:08

## 2021-07-13 RX ADMIN — HYDROMORPHONE HYDROCHLORIDE 0.5 MG: 1 INJECTION, SOLUTION INTRAMUSCULAR; INTRAVENOUS; SUBCUTANEOUS at 11:01

## 2021-07-13 RX ADMIN — APIXABAN 5 MG: 5 TABLET, FILM COATED ORAL at 17:27

## 2021-07-13 RX ADMIN — APIXABAN 5 MG: 5 TABLET, FILM COATED ORAL at 11:00

## 2021-07-13 RX ADMIN — OXYCODONE HYDROCHLORIDE 15 MG: 10 TABLET ORAL at 03:11

## 2021-07-13 RX ADMIN — ACETAMINOPHEN 975 MG: 325 TABLET, FILM COATED ORAL at 06:45

## 2021-07-13 RX ADMIN — OXYCODONE HYDROCHLORIDE 15 MG: 10 TABLET ORAL at 15:53

## 2021-07-13 RX ADMIN — HYDROMORPHONE HYDROCHLORIDE 0.5 MG: 1 INJECTION, SOLUTION INTRAMUSCULAR; INTRAVENOUS; SUBCUTANEOUS at 18:51

## 2021-07-13 RX ADMIN — ONDANSETRON 4 MG: 2 INJECTION INTRAMUSCULAR; INTRAVENOUS at 08:05

## 2021-07-13 RX ADMIN — ARIPIPRAZOLE 10 MG: 10 TABLET ORAL at 11:08

## 2021-07-13 RX ADMIN — CLONAZEPAM 0.5 MG: 0.5 TABLET ORAL at 11:00

## 2021-07-13 RX ADMIN — OXYCODONE HYDROCHLORIDE 15 MG: 10 TABLET ORAL at 19:53

## 2021-07-13 RX ADMIN — OXYCODONE HYDROCHLORIDE 15 MG: 10 TABLET ORAL at 08:04

## 2021-07-13 NOTE — ASSESSMENT & PLAN NOTE
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

## 2021-07-13 NOTE — PROGRESS NOTES
[facility]  Progress Note - Dontae Azalea 1990, 31 y o  female MRN: [id number]  Unit/Bed#: -01 Encounter: [id number]  Primary Care Provider: No primary care provider on file  Date and time admitted to hospital: 7/11/2021  3:46 PM    * Low back pain  Assessment & Plan  · Patient presents to the ED with severe low back pain  Found to have b/l lower extremity cellulitis in the setting of IVDU and gram + bacteremia  · CT abdomen and pelvis negative for acute abdominal pathology  · Given ongoing intravenous drug use and bacteremia, diskitis/osteomyelitis/abscess needs to be considered  Refusing MRI as she will not remove her nipple piercings and also patient does have retained needle in her neck from prior admission  · Discussed with Radiology, will add on lumbar spine recon  · APS following, continue ATC APAP, lidocaine patch, gabapentin, Robaxin, p r n  Oxycodone 10/15 and intravenous Dilaudid for breakthrough  Septic embolism (HCC)  Assessment & Plan  · Small scattered peripheral ground-glass opacities within the bilateral lung bases possibly due to pneumonia versus septic emboli in the setting of intravenous drug use  Id following, also need to consider aspiration from loss of consciousness from intravenous drug intoxication  · COVID-19 PCR negative  · No respiratory complaints and oxygen saturation adequate on room air  Cellulitis of lower extremity  Assessment & Plan  · Bilateral great toe erythema/swelling and significant tenderness  Slight improvement today  · X-rays pending  · Intravenous antibiotics with vancomycin/ceftriaxone  · Id following  · Serial exams    Gram-positive bacteremia  Assessment & Plan  · One of 1 blood culture from admission growing Staph aureus  Repeat blood cultures pending  · Continue Ancef and vancomycin per ID for now, follow end points  · Echo pending, may ultimately need MELISSA       Heroin abuse [facility])  Assessment & Plan  · Patient admits to recent intravenous cocaine and fentanyl use  · Monitor closely for withdrawal   Continue clonidine 0 2 b i d   · HOST referral, per patient she would be agreeable to drug rehab  Chronic anticoagulation  Assessment & Plan  · Continue Eliquis 5 mg twice daily  Bipolar 1 disorder (HCC)  Assessment & Plan  · Continue Abilify 10 mg daily  · Klonopin 0 5 mg twice daily  · Prazosin 1 mg q h s     History of endocarditis  Assessment & Plan  · 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  Patient completed six week course of antibiotics at this time  · Now with positive blood cultures again  Echo pending  May ultimately need MELISSA  VTE Pharmacologic Prophylaxis:   Moderate Risk (Score 3-4) - Pharmacological DVT Prophylaxis Ordered: apixaban (Eliquis)  Patient Centered Rounds: I performed bedside rounds with nursing staff today  Discussions with Specialists or Other Care Team Provider: nursing, radiology, cm    Education and Discussions with Family / Patient: Patient declined call to   Time Spent for Care: 30 minutes  More than 50% of total time spent on counseling and coordination of care as described above  Current Length of Stay: 1 day(s)  Current Patient Status: Inpatient   Certification Statement: The patient will continue to require additional inpatient hospital stay due to Intravenous antibiotics, will likely need prolonged course  Discharge Plan: Anticipate discharge in >72 hrs to To be determined however hopefully drug rehab    Code Status: Level 1 - Full Code    Subjective:   Patient continues to complain of severe stabbing low back pain  No radiation, no tingling or numbness  No bowel or bladder incontinence  She also complains of bilateral foot pain, worse on right side, pain radiates up into ankle  No shortness breath chest pain  No subjective fevers or chills      Objective:     Vitals: Temp (24hrs), Av 2 °F (36 8 °C), Min:97 9 °F (36 6 °C), Max:98 5 °F (36 9 °C)    Temp:  [97 9 °F (36 6 °C)-98 5 °F (36 9 °C)] 97 9 °F (36 6 °C)  HR:  [62-76] 76  Resp:  [18] 18  BP: (103-119)/(57-67) 119/59  SpO2:  [96 %-98 %] 98 %  Body mass index is 23 03 kg/m²  Input and Output Summary (last 24 hours): Intake/Output Summary (Last 24 hours) at 2021 0841  Last data filed at 2021 0840  Gross per 24 hour   Intake 1166 25 ml   Output 2900 ml   Net -1733 75 ml       Physical Exam:   Physical Exam  Vitals and nursing note reviewed  Constitutional:       Appearance: She is ill-appearing  HENT:      Head: Normocephalic  Cardiovascular:      Rate and Rhythm: Normal rate  Pulmonary:      Breath sounds: Normal breath sounds  Abdominal:      Tenderness: There is no abdominal tenderness  Musculoskeletal:         General: Swelling (Bilateral lower extremities, toes) and tenderness (Low back, right ankle, bilateral great toes) present  Skin:     General: Skin is warm  Comments: Generalized scratches/scabs, needle sticks   Neurological:      Mental Status: She is alert and oriented to person, place, and time  Mental status is at baseline     Psychiatric:         Mood and Affect: Mood normal           Additional Data:     Labs:  Results from last 7 days   Lab Units 21  0621   WBC Thousand/uL 10 34*   HEMOGLOBIN g/dL 10 1*   HEMATOCRIT % 30 8*   PLATELETS Thousands/uL 247   NEUTROS PCT % 80*   LYMPHS PCT % 6*   MONOS PCT % 13*   EOS PCT % 0     Results from last 7 days   Lab Units 21  0621   SODIUM mmol/L 140   POTASSIUM mmol/L 3 2*   CHLORIDE mmol/L 107   CO2 mmol/L 26   BUN mg/dL 4*   CREATININE mg/dL 0 41*   ANION GAP mmol/L 7   CALCIUM mg/dL 7 7*   ALBUMIN g/dL 2 2*   TOTAL BILIRUBIN mg/dL 0 44   ALK PHOS U/L 114   ALT U/L 32   AST U/L 14   GLUCOSE RANDOM mg/dL 95     Results from last 7 days   Lab Units 21  0430   INR  1 17             Results from last 7 days   Lab Units 07/13/21  0621 07/12/21  0430 07/11/21  1805   LACTIC ACID mmol/L  --   --  1 2   PROCALCITONIN ng/ml 7 79* 0 77*  --        Lines/Drains:  Invasive Devices     Peripheral Intravenous Line            Peripheral IV 07/11/21 Right;Upper;Medial Arm 1 day                      Imaging: No pertinent imaging reviewed      Recent Cultures (last 7 days):   Results from last 7 days   Lab Units 07/12/21  0210 07/11/21  1805   BLOOD CULTURE   --  Staphylococcus aureus*   GRAM STAIN RESULT   --  Gram positive cocci in clusters*   URINE CULTURE  40,000-49,000 cfu/ml   --        Last 24 Hours Medication List:   Current Facility-Administered Medications   Medication Dose Route Frequency Provider Last Rate    acetaminophen  975 mg Oral Q8H [facility] BOO Kay      aluminum-magnesium hydroxide-simethicone  30 mL Oral Q6H PRN Sergio Huff MD      apixaban  5 mg Oral BID Sergio Huff MD      ARIPiprazole  10 mg Oral Daily Sergio Huff MD      cefazolin  2,000 mg Intravenous Q8H Angela Vanessa MD Stopped (07/13/21 0415)    clonazePAM  0 5 mg Oral BID Sergio Huff MD      cloNIDine  0 2 mg Oral Q12H [facility] Sergio Huff MD      docusate sodium  100 mg Oral BID PRN Sergio Huff MD      gabapentin  300 mg Oral BID Sergio Huff MD      HYDROmorphone  0 5 mg Intravenous Q3H PRN BOO Kay      methocarbamol  750 mg Oral Q6H PRN Rosana Flores PA-C      nicotine  1 patch Transdermal Daily Sergio Huff MD      ondansetron  4 mg Intravenous Q6H PRN Sergio Huff MD      oxyCODONE  10 mg Oral Q4H PRN Olga LanBOO Rivas      oxyCODONE  15 mg Oral Q4H PRN BOO Kay      polyethylene glycol  17 g Oral Daily BOO Kay      potassium chloride  40 mEq Oral Once BOO Felder      prazosin  1 mg Oral HS Sergio Huff MD      vancomycin  1,250 mg Intravenous Jose Armando Riley MD Stopped (07/13/21 0200)        Today, Patient Was Seen By: Fina Oates, BOO    **Please Note: This note may have been constructed using a voice recognition system  **

## 2021-07-13 NOTE — ASSESSMENT & PLAN NOTE
· Patient presents to the ED with severe low back pain  Found to have b/l lower extremity cellulitis in the setting of IVDU and gram + bacteremia  · CT abdomen and pelvis negative for acute abdominal pathology  · Given ongoing intravenous drug use and bacteremia, diskitis/osteomyelitis/abscess needs to be considered  Refusing MRI as she will not remove her nipple piercings and also patient does have retained needle in her neck from prior admission  · Discussed with Radiology, will add on lumbar spine recon  · APS following, continue ATC APAP, lidocaine patch, gabapentin, Robaxin, p r n  Oxycodone 10/15 and intravenous Dilaudid for breakthrough

## 2021-07-13 NOTE — PROGRESS NOTES
Progress Note - Infectious Disease   Velia Lopes 31 y o  female MRN: [id number]  Unit/Bed#: -01 Encounter: [id number]      Impression/Recommendations:  1  Staph aureus bacteremia  Only a single blood culture was drawn at admission  Therefore, it is difficult to be certain whether this is true bacteremia versus contaminated blood draw  Unfortunately, patient had been on IV vancomycin prior to repeat blood cultures  Given active IVDU, history of TV endocarditis and status post TV repair with annuloplasty, will have to treat this is true bacteremia  Continue IV vancomycin, restarted yesterday  Continue high-dose IV cefazolin for now  Follow-up on final blood culture results  Follow-up on repeat blood cultures for clearance of bacteremia  Follow-up 2D echo  Treat x6 weeks total     As long as repeat blood cultures have no growth, will not pursue MELISSA since it will not change antibiotic plan  2  Bilateral feet cellulitis, most likely secondary to active IVDU  Cellulitis is superficial, without purulence drainage done without clinical signs of involvement of deeper structures  Patient has history of MSSA infection previously  She is clinically improved  Patient remains systemically well, without clinical signs of sepsis or systemic toxicity  Antibiotic plan as in above  Serial feet exams  Monitor temperature/WBC      3  Low back pain  Exam is relatively benign  No neurological deficit  Lumbar spine CT without evidence of vertebral osteomyelitis/diskitis or paraspinal infection  Monitor low back pain for now  Pain control per primary service      4  Abnormal lower lobes of lungs on abdomen/pelvis CT  CT showed small foci of ground-glass opacities, without cavitation or consolidation  This may be all secondary to aspiration from loss of consciousness from IV drug intoxication  Patient has no respiratory symptoms  She has no hypoxia    No antibiotic needed for this for now   Monitor respiratory symptoms  Monitor O2 saturation      5  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  Patient is status post TV repair with annuloplasty  Concern for endocarditis, as in above  Antibiotic plan as in above      6  Active IVDU, with noncompliance to care and with multiple episodes of leaving AMA  Patient is not a candidate for home IV antibiotic, if she needs prolonged IV antibiotic treatment course  Patient is at risk for drug withdrawal      Discussed with patient in detail regarding the above plan  Discussed slim service earlier      Antibiotics:  Vancomycin/cefazolin  Antibiotic # 2     Subjective:  Patient is more awake and alert  She is still quite poorly cooperative  Foot pain is improved  Stable low back pain  Temperature stays down  No chills  She is tolerating antibiotic well  No nausea, vomiting or diarrhea  Objective:  Vitals:  Temp:  [97 9 °F (36 6 °C)-98 5 °F (36 9 °C)] 97 9 °F (36 6 °C)  HR:  [62-76] 76  Resp:  [18] 18  BP: (103-119)/(59-67) 119/59  SpO2:  [96 %-98 %] 98 %  Temp (24hrs), Av 2 °F (36 8 °C), Min:97 9 °F (36 6 °C), Max:98 5 °F (36 9 °C)  Current: Temperature: 97 9 °F (36 6 °C)    Physical Exam:     General: Awake, alert, cooperative, no distress  Neck:  Supple  No mass  No lymphadenopathy  Lungs: Expansion symmetric, no rales, no wheezing, respirations unlabored  Heart:  Regular rate and rhythm, S1 and S2 normal, stable murmur  Abdomen: Soft, nondistended, non-tender, bowel sounds active all four quadrants, no masses, no organomegaly  Back:  Mild lower lumbar tenderness  No deformity  No erythema/warmth  No ulcer  Extremities: Improved bilateral feet edema  Improved erythema/warmth  No ulcer  Improved tenderness  Skin:  No rash  Neuro: Moves all extremities       Invasive Devices     Peripheral Intravenous Line            Peripheral IV 21 Right;Upper;Medial Arm 1 day                Labs studies:   I have personally reviewed pertinent labs  Results from last 7 days   Lab Units 07/13/21  0621 07/11/21  1805   POTASSIUM mmol/L 3 2* 3 0*   CHLORIDE mmol/L 107 100   CO2 mmol/L 26 27   BUN mg/dL 4* 10   CREATININE mg/dL 0 41* 0 52*   EGFR ml/min/1 73sq m 141 130   CALCIUM mg/dL 7 7* 8 5   AST U/L 14 42   ALT U/L 32 56   ALK PHOS U/L 114 110     Results from last 7 days   Lab Units 07/13/21  0621 07/11/21  1805   WBC Thousand/uL 10 34* 13 07*   HEMOGLOBIN g/dL 10 1* 11 1*   PLATELETS Thousands/uL 247 299     Results from last 7 days   Lab Units 07/13/21  0623 07/13/21  0622 07/12/21  0210 07/11/21  1805   BLOOD CULTURE  Received in Microbiology Lab  Culture in Progress  Received in Microbiology Lab  Culture in Progress  --  Staphylococcus aureus*   GRAM STAIN RESULT   --   --   --  Gram positive cocci in clusters*   URINE CULTURE   --   --  40,000-49,000 cfu/ml   --        Imaging Studies:   I have personally reviewed pertinent imaging study reports and images in PACS  Lumbar spine CT reviewed personally  No diskitis or vertebral osteomyelitis  No paraspinal abscess  Stable ground-glass opacities in bilateral lower lobes  EKG, Pathology, and Other Studies:   I have personally reviewed pertinent reports

## 2021-07-13 NOTE — ASSESSMENT & PLAN NOTE
· One of 1 blood culture from admission growing Staph aureus  Repeat blood cultures pending  · Continue Ancef and vancomycin per ID for now, follow end points  · Echo pending, may ultimately need MELISSA

## 2021-07-13 NOTE — ASSESSMENT & PLAN NOTE
· Patient admits to recent intravenous cocaine and fentanyl use  · Monitor closely for withdrawal   Continue clonidine 0 2 b i d   · HOST referral, per patient she would be agreeable to drug rehab

## 2021-07-13 NOTE — ASSESSMENT & PLAN NOTE
What Is The Reason For Today's Visit?: Follow Up Basosquamous Cell Cancer · Bilateral great toe erythema/swelling and significant tenderness  Slight improvement today    · X-rays pending  · Intravenous antibiotics with vancomycin/ceftriaxone  · Id following  · Serial exams

## 2021-07-13 NOTE — ASSESSMENT & PLAN NOTE
· Small scattered peripheral ground-glass opacities within the bilateral lung bases possibly due to pneumonia versus septic emboli in the setting of intravenous drug use  Id following, also need to consider aspiration from loss of consciousness from intravenous drug intoxication  · COVID-19 PCR negative  · No respiratory complaints and oxygen saturation adequate on room air

## 2021-07-13 NOTE — UTILIZATION REVIEW
Inpatient Admission Authorization Request   NOTIFICATION OF INPATIENT ADMISSION/INPATIENT AUTHORIZATION REQUEST   SERVICING FACILITY:   [facility]  Address: [address] [place] [address], [address] [place], [place] [id number]  Tax ID: [id number]  NPI: [id number]  Place of Service: Inpatient [address] [facility] Code: 24     ATTENDING PROVIDER:  Attending Name and NPI#: Rica Galdamez Md [[id number]]  Address: [address] [place] [address], [address] [place] [id number]  Phone: [phone]     UTILIZATION REVIEW CONTACT:  Jessica Coffman Utilization   Network Utilization Review Department  Phone: [phone]  Fax: [phone]  Email: [email or web address]     PHYSICIAN ADVISORY SERVICES:  FOR [id number] REVIEW - MEDICAL NECESSITY DENIAL  Phone: [phone]  Fax: [phone]  Email: [email or web address]     TYPE OF REQUEST:  Inpatient Status     ADMISSION INFORMATION:  ADMISSION DATE/TIME: 7/12/21  4:35 AM  PATIENT DIAGNOSIS CODE/DESCRIPTION:  Hypokalemia [E87 6]  Substance abuse (HCC) [F19 10]  Murmur, cardiac [R01 1]  Leukocytosis [D72 829]  Recreational drug use [F19 90]  Septic embolism (HCC) [I76]  Flank pain [R10 9]  Polysubstance abuse ([address] ) [F19 10]  Heroin abuse ([address] ) [F11 10]  Sepsis ([address] ) [A41 9]  Ground glass opacity present on imaging of lung [R91 8]  DISCHARGE DATE/TIME: No discharge date for patient encounter  DISCHARGE DISPOSITION (IF DISCHARGED): Home/Self Care     IMPORTANT INFORMATION:  Please contact the Jessica Coffman directly with any questions or concerns regarding this request  Department voicemails are confidential     Send requests for admission clinical reviews, concurrent reviews, approvals, and administrative denials due to lack of clinical to fax [phone]

## 2021-07-13 NOTE — CASE MANAGEMENT
CM met with pt at bedside to complete SW assessment and discuss pt's plan for discharge -- Pt woke to CM's voice, but was immediately defensive and unreceptive to conversation  Pt stated, "I'm tired," "I don't feel well," "Leave me alone "    CM asked brief questions, including "Do you live alone?" Pt responded, "I live with my family " Pt unwilling to discuss further  CM briefly asked if pt is receptive to drug rehab -- Pt was agreeable; CM subsequently offered to make referral to \[facility]\"" for placement  Pt declined, stating "I don't need your help " CM gently asked how she plans to d/c to rehab without referral-- Pt stated, "I have someone on the outside that will help me " CM attempted to ask pt to expand this idea further, however she asked CM to "go away " CM therefore left room  CM s/w NP Aguergo -- CM will anticipate d/c in greater than 72hrs, likely "6 weeks"    CM will attempt to f/u tomorrow

## 2021-07-13 NOTE — PROGRESS NOTES
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  She presented to the ED on 21 complaining of low back pain, as well as pain in bilateral hands and feet  1  Cellulitis of bilateral hands and feet  There is no evidence of worsening infection as her WBCs are trending downward and she is afebrile  She is currently on Ancef 2000 mg Q8h  Due to her positive blood culture in the ED showing Staph aureus, will continue IV antibiotic treatment for suspected endocarditis for 6 weeks  2  Low back pain  CT of lumbar spine was performed this morning showing no evidence of osteomyelitis Continue Tylenol 975 mg Q8h for symptom relief  No other treatment is necessary at this time  3  Ground glass opacities on abdominal CT  This is likely due to aspiration pneumonia from her history of IV drug use  She has no current respiratory complaints and no evidence of hypoxia  No other treatment is necessary at this time  Antibiotics:  Patient is currently on Ancef 2000 mg Q8h for cellulitis without evidence of abscesses or purulent drainage  Subjective:  Patient mainly complains of non-radiating, stabbing and burning lower back pain  Otherwise, there are no other complaints  Patient has no fever, chills, sweats; no nausea, vomiting, diarrhea; no cough, shortness of breath  No new symptoms       Objective:  Vitals:  Temp:  [97 9 °F (36 6 °C)-98 5 °F (36 9 °C)] 97 9 °F (36 6 °C)  HR:  [62-76] 76  Resp:  [18] 18  BP: (103-119)/(57-67) 119/59  SpO2:  [96 %-98 %] 98 %  Temp (24hrs), Av 2 °F (36 8 °C), Min:97 9 °F (36 6 °C), Max:98 5 °F (36 9 °C)  Current: Temperature: 97 9 °F (36 6 °C)    Physical Exam:   General Appearance:  Lethargic, nontoxic, no acute distress  Lungs:   Respirations unlabored       Abdomen:   Soft, non-distended  Extremities: Diffuse edema of bilateral hands and feet with erythema and warmth  There is some improvement of erythema in bilateral feet today  Skin: Excoriations on bilateral hands  No draining wounds noted         Labs, Imaging, & Other studies:   All pertinent labs and imaging studies were personally reviewed  Results from last 7 days   Lab Units 07/13/21  0621 07/11/21  1805   WBC Thousand/uL 10 34* 13 07*   HEMOGLOBIN g/dL 10 1* 11 1*   PLATELETS Thousands/uL 247 299     Results from last 7 days   Lab Units 07/13/21  0621 07/11/21  1805   SODIUM mmol/L 140 134*   POTASSIUM mmol/L 3 2* 3 0*   CHLORIDE mmol/L 107 100   CO2 mmol/L 26 27   BUN mg/dL 4* 10   CREATININE mg/dL 0 41* 0 52*   EGFR ml/min/1 73sq m 141 130   CALCIUM mg/dL 7 7* 8 5   AST U/L 14 42   ALT U/L 32 56   ALK PHOS U/L 114 110     Results from last 7 days   Lab Units 07/12/21  0210 07/11/21  1805   BLOOD CULTURE   --  Staphylococcus aureus*   GRAM STAIN RESULT   --  Gram positive cocci in clusters*   URINE CULTURE  40,000-49,000 cfu/ml   --      Results from last 7 days   Lab Units 07/13/21  0621 07/12/21  0430   PROCALCITONIN ng/ml 7 79* 0 77*

## 2021-07-13 NOTE — PROGRESS NOTES
Progress Note - Acute Pain Service    Giovany Lara 31 y o  female MRN: [id number]  Unit/Bed#: -01 Encounter: [id number]      Assessment:   Principal Problem:    Low back pain  Active Problems:    Septic embolism (HCC)    Bipolar 1 disorder (HCC)    Chronic anticoagulation    Heroin abuse ([facility] [address] )    Cellulitis of lower extremity    Gram-positive bacteremia    History of endocarditis    Giovany Lara is a 31 y o  female  A several past admissions for bacteremia, endocarditis and septic emboli  Due to ongoing IV drug use  Admitted   7/12/21 for likely septic pulmonary emboli, possible bacteremia and new onset lower back pain  Plan:     continue Tylenol 975 milligrams p o  q 8 hours scheduled   Continue oxycodone 10 milligrams p o  q 4 hours p r n  moderate pain   Continue oxycodone 15 milligrams p o  q 4 hours p r n  severe pain   Continue Dilaudid 0 5 milligrams IV q 3 hours p r n  breakthrough pain   Plan to decrease frequency of IV Dilaudid tomorrow   Decrease clonidine to 0 1 milligrams p o  b i d  scheduled  With hold parameters   Continue Klonopin 0 5 milligrams p o  b i d  scheduled with hold parameters for sedation    decrease Neurontin to 200 milligrams p o  b i d  scheduled   Add Toradol 15 milligrams IV q 6 hours times 48 hours   Continue Robaxin 750 milligrams p o  q 6 hours p r n  muscle spasm   Continue bowel regimen to avoid opioid induced constipation   Ice to affected area for up to 20 minutes every hour as needed  APS will continue to follow  Please contact Acute Pain Service - [facility] via [employer] from [phone] with additional questions or concerns  See Edmond or Darvin for additional contacts and after hours information  Pain History  Current pain location(s):  Lower back  Pain Scale:    10/10  Quality:  sharp  24 hour history:  Patient complains of severe lower back pain with which she woke up yesterday    Also complains of pain in her feet  Admits to using IV fentanyl and heroin laced with cocaine  Using much more than 1 bundle per day  Unable to quantify further  When I entered the room and talked to the patient, she initially appeared very lethargic and could not keep her eyes open or continue a conversation  When she recognized me she became more alert and state awake for the remainder of the conversation  Opioid requirement previous 24 hours:   Oxycodone 45 milligrams p o , Dilaudid 1 5 milligrams IV      Meds/Allergies   all current active meds have been reviewed, current meds:   Current Facility-Administered Medications   Medication Dose Route Frequency    acetaminophen (TYLENOL) tablet 975 mg  975 mg Oral Q8H [address]    aluminum-magnesium hydroxide-simethicone (MYLANTA) oral suspension 30 mL  30 mL Oral Q6H PRN    apixaban (ELIQUIS) tablet 5 mg  5 mg Oral BID    ARIPiprazole (ABILIFY) tablet 10 mg  10 mg Oral Daily    ceFAZolin (ANCEF) IVPB (premix in dextrose) 2,000 mg 50 mL  2,000 mg Intravenous Q8H    clonazePAM (KlonoPIN) tablet 0 5 mg  0 5 mg Oral BID    cloNIDine (CATAPRES) tablet 0 2 mg  0 2 mg Oral Q12H [address]    docusate sodium (COLACE) capsule 100 mg  100 mg Oral BID PRN    gabapentin (NEURONTIN) capsule 300 mg  300 mg Oral BID    HYDROmorphone (DILAUDID) injection 0 5 mg  0 5 mg Intravenous Q3H PRN    methocarbamol (ROBAXIN) tablet 750 mg  750 mg Oral Q6H PRN    nicotine (NICODERM CQ) 21 mg/24 hr TD 24 hr patch 1 patch  1 patch Transdermal Daily    ondansetron (ZOFRAN) injection 4 mg  4 mg Intravenous Q6H PRN    oxyCODONE (ROXICODONE) immediate release tablet 10 mg  10 mg Oral Q4H PRN    oxyCODONE (ROXICODONE) IR tablet 15 mg  15 mg Oral Q4H PRN    polyethylene glycol (MIRALAX) packet 17 g  17 g Oral Daily    potassium chloride (K-DUR,KLOR-CON) CR tablet 40 mEq  40 mEq Oral Once    prazosin (MINIPRESS) capsule 1 mg  1 mg Oral HS    vancomycin (VANCOCIN) 1,250 mg in sodium chloride 0 9 % 250 mL IVPB  1,250 mg Intravenous Q8H    and PTA meds:   Prior to Admission Medications   Prescriptions Last Dose Informant Patient Reported? Taking? ARIPiprazole (ABILIFY) 10 mg tablet Unknown at Unknown time  Yes No   Sig: Take 10 mg by mouth daily Dosage unknown   apixaban (ELIQUIS) 5 mg   No No   Sig: Take 2 tablets (10 mg total) by mouth 2 (two) times a day for 7 days, THEN 1 tablet (5 mg total) 2 (two) times a day for 23 days  clonazePAM (KlonoPIN) 0 5 mg tablet   No No   Sig: Take 1 tablet (0 5 mg total) by mouth 2 (two) times a day for 3 days   gabapentin (NEURONTIN) 300 mg capsule   No No   Sig: Take 1 capsule (300 mg total) by mouth 2 (two) times a day for 7 days   prazosin (MINIPRESS) 1 mg capsule   No No   Sig: Take 1 capsule (1 mg total) by mouth daily at bedtime      Facility-Administered Medications: None       Allergies   Allergen Reactions    Cat Hair Extract Itching    Dog Epithelium     Latex     Pollen Extract        Objective     Temp:  [97 9 °F (36 6 °C)] 97 9 °F (36 6 °C)  HR:  [62-76] 76  BP: (103-119)/(59-60) 119/59    Physical Exam  Vitals and nursing note reviewed  Constitutional:       General: She is sleeping  She is not in acute distress  Appearance: She is ill-appearing  She is not toxic-appearing or diaphoretic  Comments:   Appears in no acute distress  Rolled back and forth in bed several times without apparent pain  Eyes:      Conjunctiva/sclera: Conjunctivae normal       Pupils: Pupils are equal, round, and reactive to light  Cardiovascular:      Rate and Rhythm: Regular rhythm  Bradycardia present  Skin:     General: Skin is warm and dry  Neurological:      Mental Status: She is oriented to person, place, and time and easily aroused  GCS: GCS eye subscore is 3  GCS verbal subscore is 5  GCS motor subscore is 6  Psychiatric:         Attention and Perception: She is inattentive  Mood and Affect: Affect is blunt           Speech: Speech normal  Behavior: Behavior is cooperative  Lab Results:   Results from last 7 days   Lab Units 07/13/21  0621   WBC Thousand/uL 10 34*   HEMOGLOBIN g/dL 10 1*   HEMATOCRIT % 30 8*   PLATELETS Thousands/uL 247      Results from last 7 days   Lab Units 07/13/21  0621   POTASSIUM mmol/L 3 2*   CHLORIDE mmol/L 107   CO2 mmol/L 26   BUN mg/dL 4*   CREATININE mg/dL 0 41*   CALCIUM mg/dL 7 7*   ALK PHOS U/L 114   ALT U/L 32   AST U/L 14       Imaging Studies: I have personally reviewed pertinent reports  EKG, Pathology, and Other Studies: I have personally reviewed pertinent reports  Counseling / Coordination of Care  Total floor / unit time spent today 30 minutes  Greater than 50% of total time was spent with the patient and / or family counseling and / or coordination of care  A description of the counseling / coordination of care:  Patient interview, physical examination, review of medical record, review of imaging and laboratory data, development of pain management plan, discussion of pain management plan with patient and primary service  Please note that the APS provides consultative services regarding pain management only  With the exception of ketamine and epidural infusions and except when indicated, final decisions regarding starting or changing doses of analgesic medications are at the discretion of the consulting service  Off hours consultation and/or medication management is generally not available      Miranda Corey PA-C  Acute Pain Service

## 2021-07-13 NOTE — PROGRESS NOTES
Vancomycin IV Pharmacy-to-Dose Consultation    Ana Landry is a 31 y o  female who is currently receiving Vancomycin IV with management by the Pharmacy Consult service  Assessment/Plan:  The patient was reviewed  Renal function is stable and no signs or symptoms of nephrotoxicity and/or infusion reactions were documented in the chart  Based on todays assessment, continue current vancomycin (day # 2) dosing of 1250mg IV Q8H  Patient refused trough x2  Plan for trough to be drawn at 1030 on 7/14  We will continue to follow the patients culture results and clinical progress daily      [facility]  Staff Pharmacist

## 2021-07-14 ENCOUNTER — APPOINTMENT (INPATIENT)
Dept: NON INVASIVE DIAGNOSTICS | Facility: HOSPITAL | Age: 31
DRG: 710 | End: 2021-07-14
Payer: COMMERCIAL

## 2021-07-14 LAB
ANION GAP SERPL CALCULATED.3IONS-SCNC: 8 MMOL/L (ref 4–13)
ANISOCYTOSIS BLD QL SMEAR: PRESENT
BACTERIA BLD CULT: ABNORMAL
BASOPHILS # BLD MANUAL: 0 THOUSAND/UL (ref 0–0.1)
BASOPHILS NFR MAR MANUAL: 0 % (ref 0–1)
BUN SERPL-MCNC: 8 MG/DL (ref 5–25)
BURR CELLS BLD QL SMEAR: PRESENT
CALCIUM SERPL-MCNC: 7.5 MG/DL (ref 8.3–10.1)
CHLORIDE SERPL-SCNC: 106 MMOL/L (ref 100–108)
CO2 SERPL-SCNC: 23 MMOL/L (ref 21–32)
CREAT SERPL-MCNC: 0.78 MG/DL (ref 0.6–1.3)
EOSINOPHIL # BLD MANUAL: 0 THOUSAND/UL (ref 0–0.4)
EOSINOPHIL NFR BLD MANUAL: 0 % (ref 0–6)
ERYTHROCYTE [DISTWIDTH] IN BLOOD BY AUTOMATED COUNT: 13.1 % (ref 11.6–15.1)
GFR SERPL CREATININE-BSD FRML MDRD: 104 ML/MIN/1.73SQ M
GLUCOSE SERPL-MCNC: 152 MG/DL (ref 65–140)
GRAM STN SPEC: ABNORMAL
HCT VFR BLD AUTO: 28.4 % (ref 34.8–46.1)
HELMET CELLS BLD QL SMEAR: PRESENT
HGB BLD-MCNC: 9.4 G/DL (ref 11.5–15.4)
LYMPHOCYTES # BLD AUTO: 1.18 THOUSAND/UL (ref 0.6–4.47)
LYMPHOCYTES # BLD AUTO: 14 % (ref 14–44)
MACROCYTES BLD QL AUTO: PRESENT
MCH RBC QN AUTO: 29.1 PG (ref 26.8–34.3)
MCHC RBC AUTO-ENTMCNC: 33.1 G/DL (ref 31.4–37.4)
MCV RBC AUTO: 88 FL (ref 82–98)
MICROCYTES BLD QL AUTO: PRESENT
MONOCYTES # BLD AUTO: 0.17 THOUSAND/UL (ref 0–1.22)
MONOCYTES NFR BLD: 2 % (ref 4–12)
NEUTROPHILS # BLD MANUAL: 6.68 THOUSAND/UL (ref 1.85–7.62)
NEUTS BAND NFR BLD MANUAL: 6 % (ref 0–8)
NEUTS SEG NFR BLD AUTO: 73 % (ref 43–75)
NRBC BLD AUTO-RTO: 0 /100 WBCS
PLATELET # BLD AUTO: 265 THOUSANDS/UL (ref 149–390)
PLATELET BLD QL SMEAR: ADEQUATE
PMV BLD AUTO: 9 FL (ref 8.9–12.7)
POLYCHROMASIA BLD QL SMEAR: PRESENT
POTASSIUM SERPL-SCNC: 3.1 MMOL/L (ref 3.5–5.3)
RBC # BLD AUTO: 3.23 MILLION/UL (ref 3.81–5.12)
RBC MORPH BLD: PRESENT
SODIUM SERPL-SCNC: 137 MMOL/L (ref 136–145)
VANCOMYCIN TROUGH SERPL-MCNC: 21.8 UG/ML (ref 10–20)
VARIANT LYMPHS # BLD AUTO: 5 %
WBC # BLD AUTO: 8.46 THOUSAND/UL (ref 4.31–10.16)

## 2021-07-14 PROCEDURE — 99232 SBSQ HOSP IP/OBS MODERATE 35: CPT | Performed by: NURSE PRACTITIONER

## 2021-07-14 PROCEDURE — 85027 COMPLETE CBC AUTOMATED: CPT | Performed by: NURSE PRACTITIONER

## 2021-07-14 PROCEDURE — 99233 SBSQ HOSP IP/OBS HIGH 50: CPT | Performed by: INTERNAL MEDICINE

## 2021-07-14 PROCEDURE — 80048 BASIC METABOLIC PNL TOTAL CA: CPT | Performed by: NURSE PRACTITIONER

## 2021-07-14 PROCEDURE — 80202 ASSAY OF VANCOMYCIN: CPT | Performed by: INTERNAL MEDICINE

## 2021-07-14 PROCEDURE — 85007 BL SMEAR W/DIFF WBC COUNT: CPT | Performed by: NURSE PRACTITIONER

## 2021-07-14 RX ORDER — POTASSIUM CHLORIDE 14.9 MG/ML
20 INJECTION INTRAVENOUS ONCE
Status: COMPLETED | OUTPATIENT
Start: 2021-07-14 | End: 2021-07-14

## 2021-07-14 RX ORDER — POTASSIUM CHLORIDE 20 MEQ/1
40 TABLET, EXTENDED RELEASE ORAL ONCE
Status: COMPLETED | OUTPATIENT
Start: 2021-07-14 | End: 2021-07-14

## 2021-07-14 RX ORDER — VANCOMYCIN HYDROCHLORIDE 1 G/200ML
1000 INJECTION, SOLUTION INTRAVENOUS EVERY 8 HOURS
Status: DISCONTINUED | OUTPATIENT
Start: 2021-07-14 | End: 2021-07-14 | Stop reason: DRUGHIGH

## 2021-07-14 RX ORDER — HYDROMORPHONE HCL/PF 1 MG/ML
0.5 SYRINGE (ML) INJECTION EVERY 6 HOURS PRN
Status: DISCONTINUED | OUTPATIENT
Start: 2021-07-14 | End: 2021-07-18 | Stop reason: HOSPADM

## 2021-07-14 RX ORDER — POTASSIUM CHLORIDE 20 MEQ/1
40 TABLET, EXTENDED RELEASE ORAL ONCE
Status: DISCONTINUED | OUTPATIENT
Start: 2021-07-14 | End: 2021-07-16

## 2021-07-14 RX ORDER — POTASSIUM CHLORIDE 20 MEQ/1
40 TABLET, EXTENDED RELEASE ORAL ONCE
Status: DISCONTINUED | OUTPATIENT
Start: 2021-07-14 | End: 2021-07-14

## 2021-07-14 RX ADMIN — CLONAZEPAM 0.5 MG: 0.5 TABLET ORAL at 08:33

## 2021-07-14 RX ADMIN — VANCOMYCIN HYDROCHLORIDE 1250 MG: 10 INJECTION, POWDER, LYOPHILIZED, FOR SOLUTION INTRAVENOUS at 18:20

## 2021-07-14 RX ADMIN — OXYCODONE HYDROCHLORIDE 15 MG: 10 TABLET ORAL at 20:13

## 2021-07-14 RX ADMIN — OXYCODONE HYDROCHLORIDE 15 MG: 10 TABLET ORAL at 04:14

## 2021-07-14 RX ADMIN — HYDROMORPHONE HYDROCHLORIDE 0.5 MG: 1 INJECTION, SOLUTION INTRAMUSCULAR; INTRAVENOUS; SUBCUTANEOUS at 12:27

## 2021-07-14 RX ADMIN — GABAPENTIN 200 MG: 100 CAPSULE ORAL at 08:35

## 2021-07-14 RX ADMIN — KETOROLAC TROMETHAMINE 15 MG: 30 INJECTION, SOLUTION INTRAMUSCULAR; INTRAVENOUS at 18:18

## 2021-07-14 RX ADMIN — APIXABAN 5 MG: 5 TABLET, FILM COATED ORAL at 08:35

## 2021-07-14 RX ADMIN — ARIPIPRAZOLE 10 MG: 10 TABLET ORAL at 08:35

## 2021-07-14 RX ADMIN — VANCOMYCIN HYDROCHLORIDE 1250 MG: 10 INJECTION, POWDER, LYOPHILIZED, FOR SOLUTION INTRAVENOUS at 01:06

## 2021-07-14 RX ADMIN — POTASSIUM CHLORIDE 40 MEQ: 1500 TABLET, EXTENDED RELEASE ORAL at 15:54

## 2021-07-14 RX ADMIN — ACETAMINOPHEN 975 MG: 325 TABLET, FILM COATED ORAL at 06:05

## 2021-07-14 RX ADMIN — HYDROMORPHONE HYDROCHLORIDE 0.5 MG: 1 INJECTION, SOLUTION INTRAMUSCULAR; INTRAVENOUS; SUBCUTANEOUS at 18:29

## 2021-07-14 RX ADMIN — VANCOMYCIN HYDROCHLORIDE 1250 MG: 10 INJECTION, POWDER, LYOPHILIZED, FOR SOLUTION INTRAVENOUS at 11:46

## 2021-07-14 RX ADMIN — OXYCODONE HYDROCHLORIDE 15 MG: 10 TABLET ORAL at 15:54

## 2021-07-14 RX ADMIN — KETOROLAC TROMETHAMINE 15 MG: 30 INJECTION, SOLUTION INTRAMUSCULAR; INTRAVENOUS at 00:36

## 2021-07-14 RX ADMIN — KETOROLAC TROMETHAMINE 15 MG: 30 INJECTION, SOLUTION INTRAMUSCULAR; INTRAVENOUS at 11:46

## 2021-07-14 RX ADMIN — GABAPENTIN 200 MG: 100 CAPSULE ORAL at 18:18

## 2021-07-14 RX ADMIN — APIXABAN 5 MG: 5 TABLET, FILM COATED ORAL at 18:18

## 2021-07-14 RX ADMIN — POTASSIUM CHLORIDE 20 MEQ: 14.9 INJECTION, SOLUTION INTRAVENOUS at 08:34

## 2021-07-14 RX ADMIN — KETOROLAC TROMETHAMINE 15 MG: 30 INJECTION, SOLUTION INTRAMUSCULAR; INTRAVENOUS at 06:05

## 2021-07-14 RX ADMIN — CEFAZOLIN SODIUM 2000 MG: 2 SOLUTION INTRAVENOUS at 04:08

## 2021-07-14 RX ADMIN — CLONAZEPAM 0.5 MG: 0.5 TABLET ORAL at 18:18

## 2021-07-14 RX ADMIN — CEFAZOLIN SODIUM 2000 MG: 2 SOLUTION INTRAVENOUS at 11:46

## 2021-07-14 RX ADMIN — CLONIDINE HYDROCHLORIDE 0.1 MG: 0.1 TABLET ORAL at 08:33

## 2021-07-14 NOTE — ASSESSMENT & PLAN NOTE
· Bilateral great toe erythema/swelling and significant tenderness  Slight improvement today    · X-rays negative  · Intravenous antibiotics with vancomycin/ceftriaxone  · ID following  · Serial exams

## 2021-07-14 NOTE — ASSESSMENT & PLAN NOTE
· Patient admits to recent intravenous cocaine and fentanyl use  · Monitor closely for withdrawal   Continue clonidine 0 2 b i d  · CM following, refused HOST

## 2021-07-14 NOTE — PROGRESS NOTES
Vancomycin IV Pharmacy-to-Dose Consultation    Gurpreet Quijano is a 31 y o  female who is currently receiving Vancomycin IV with management by the Pharmacy Consult service  Assessment/Plan:  Disrgard previous entry  The patient's vancomyin trough although high @ 21 8 was drawn early  The calculated adjusment places the trough closer to 18 0  Continue with vancomycin 1250mg q8h as previously ordered  Will reorder trough as needed  We will continue to follow the patients culture results and clinical progress daily      Joesph Bradshaw, Pharmacist

## 2021-07-14 NOTE — PROGRESS NOTES
Progress Note - Acute Pain Service    Velia Lopes 31 y o  female MRN: [id number]  Unit/Bed#: -01 Encounter: [id number]      Assessment:   Principal Problem:    Low back pain  Active Problems:    Septic embolism (HCC)    Bipolar 1 disorder (HCC)    Chronic anticoagulation    Heroin abuse ([facility] [address] )    Cellulitis of lower extremity    Gram-positive bacteremia    History of endocarditis    Velia Lopes is a 31 y o  female  With several past admissions for bacteremia, endocarditis and septic emboli  Due to ongoing IV drug use  Admitted   7/12/21 for likely septic pulmonary emboli, possible bacteremia and new onset lower back pain  Plan:    Continue Tylenol 975 mg p o  q 8 hours scheduled   Continue Toradol 15 mg IV q 6 hours scheduled for 48 hours   Continue to monitor renal function closely   Continue oxycodone 10 mg p o  q 4 hours p r n  moderate pain   Continue oxycodone 15 mg p o  q 4 hours p r n  severe pain   Decrease IV Dilaudid to 0 5 mg IV q 6 hours p r n  breakthrough pain   Continue clonidine 0 1 mg p o  q 12 hours scheduled   Continue Klonopin 0 5 mg p o  b i d  scheduled   Continue Neurontin 200 mg p o  b i d  scheduled   Continue Robaxin 750 mg p o  q 6 hours p r n  muscle spasms   Suggest add lidocaine patch to lower back for up to 12 hours daily   Continue bowel regimen to avoid opioid induced constipation  APS will continue to follow  Please contact Acute Pain Service - [facility] via [employer] from [phone] with additional questions or concerns  See Edmond or Darvin for additional contacts and after hours information  Pain History  Current pain location(s):   Lower back  Pain Scale:    10/10  Quality:  excruciating  24 hour history:  Patient continues to complain of severe lower back pain  Appears comfortable lying in bed  Moves around freely without apparent pain      Opioid requirement previous 24 hours:   Oxycodone 45 mg p o , Dilaudid 1 mg IV  Meds/Allergies   all current active meds have been reviewed, current meds:   Current Facility-Administered Medications   Medication Dose Route Frequency    acetaminophen (TYLENOL) tablet 975 mg  975 mg Oral Q8H [address]    aluminum-magnesium hydroxide-simethicone (MYLANTA) oral suspension 30 mL  30 mL Oral Q6H PRN    apixaban (ELIQUIS) tablet 5 mg  5 mg Oral BID    ARIPiprazole (ABILIFY) tablet 10 mg  10 mg Oral Daily    ceFAZolin (ANCEF) IVPB (premix in dextrose) 2,000 mg 50 mL  2,000 mg Intravenous Q8H    clonazePAM (KlonoPIN) tablet 0 5 mg  0 5 mg Oral BID    cloNIDine (CATAPRES) tablet 0 1 mg  0 1 mg Oral Q12H [address]    docusate sodium (COLACE) capsule 100 mg  100 mg Oral BID PRN    gabapentin (NEURONTIN) capsule 200 mg  200 mg Oral BID    HYDROmorphone (DILAUDID) injection 0 5 mg  0 5 mg Intravenous Q3H PRN    ketorolac (TORADOL) injection 15 mg  15 mg Intravenous Q6H [address]    methocarbamol (ROBAXIN) tablet 750 mg  750 mg Oral Q6H PRN    nicotine (NICODERM CQ) 21 mg/24 hr TD 24 hr patch 1 patch  1 patch Transdermal Daily    ondansetron (ZOFRAN) injection 4 mg  4 mg Intravenous Q6H PRN    oxyCODONE (ROXICODONE) immediate release tablet 10 mg  10 mg Oral Q4H PRN    oxyCODONE (ROXICODONE) IR tablet 15 mg  15 mg Oral Q4H PRN    polyethylene glycol (MIRALAX) packet 17 g  17 g Oral Daily    potassium chloride (K-DUR,KLOR-CON) CR tablet 40 mEq  40 mEq Oral Once    potassium chloride (K-DUR,KLOR-CON) CR tablet 40 mEq  40 mEq Oral Once    potassium chloride 20 mEq IVPB (premix)  20 mEq Intravenous Once    prazosin (MINIPRESS) capsule 1 mg  1 mg Oral HS    vancomycin (VANCOCIN) 1,250 mg in sodium chloride 0 9 % 250 mL IVPB  1,250 mg Intravenous Q8H    and PTA meds:   Prior to Admission Medications   Prescriptions Last Dose Informant Patient Reported? Taking?    ARIPiprazole (ABILIFY) 10 mg tablet Unknown at Unknown time  Yes No   Sig: Take 10 mg by mouth daily Dosage unknown   apixaban (ELIQUIS) 5 mg   No No   Sig: Take 2 tablets (10 mg total) by mouth 2 (two) times a day for 7 days, THEN 1 tablet (5 mg total) 2 (two) times a day for 23 days  clonazePAM (KlonoPIN) 0 5 mg tablet   No No   Sig: Take 1 tablet (0 5 mg total) by mouth 2 (two) times a day for 3 days   gabapentin (NEURONTIN) 300 mg capsule   No No   Sig: Take 1 capsule (300 mg total) by mouth 2 (two) times a day for 7 days   prazosin (MINIPRESS) 1 mg capsule   No No   Sig: Take 1 capsule (1 mg total) by mouth daily at bedtime      Facility-Administered Medications: None       Allergies   Allergen Reactions    Cat Hair Extract Itching    Dog Epithelium     Latex     Pollen Extract        Objective     Temp:  [98 5 °F (36 9 °C)] 98 5 °F (36 9 °C)  HR:  [58] 58  Resp:  [16-18] 18  BP: (110)/(57) 110/57    Physical Exam  Vitals and nursing note reviewed  Constitutional:       General: She is awake  She is not in acute distress  Skin:     General: Skin is warm and dry  Neurological:      Mental Status: She is alert and oriented to person, place, and time  GCS: GCS eye subscore is 4  GCS verbal subscore is 5  GCS motor subscore is 6  Psychiatric:         Behavior: Behavior is cooperative  Lab Results:   Results from last 7 days   Lab Units 07/14/21  0454   WBC Thousand/uL 8 46   HEMOGLOBIN g/dL 9 4*   HEMATOCRIT % 28 4*   PLATELETS Thousands/uL 265      Results from last 7 days   Lab Units 07/14/21  0454 07/13/21  0621   POTASSIUM mmol/L 3 1* 3 2*   CHLORIDE mmol/L 106 107   CO2 mmol/L 23 26   BUN mg/dL 8 4*   CREATININE mg/dL 0 78 0 41*   CALCIUM mg/dL 7 5* 7 7*   ALK PHOS U/L  --  114   ALT U/L  --  32   AST U/L  --  14       Imaging Studies: I have personally reviewed pertinent reports  EKG, Pathology, and Other Studies: I have personally reviewed pertinent reports  Counseling / Coordination of Care  Total floor / unit time spent today 30 minutes   Greater than 50% of total time was spent with the patient and / or family counseling and / or coordination of care  A description of the counseling / coordination of care:  Patient interview, physical examination, review of medical record, review of imaging and laboratory data, development of pain management plan, discussion of pain management plan with patient and primary service  Please note that the APS provides consultative services regarding pain management only  With the exception of ketamine and epidural infusions and except when indicated, final decisions regarding starting or changing doses of analgesic medications are at the discretion of the consulting service  Off hours consultation and/or medication management is generally not available      Reza Bob PA-C  Acute Pain Service

## 2021-07-14 NOTE — ASSESSMENT & PLAN NOTE
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

## 2021-07-14 NOTE — PROGRESS NOTES
Vancomycin IV Pharmacy-to-Dose Consultation    Lyndsey Rueda is a 31 y o  female who is currently receiving Vancomycin IV with management by the Pharmacy Consult service  Assessment/Plan:  The patient was reviewed  Renal function is stable and no signs or symptoms of nephrotoxicity and/or infusion reactions were documented in the chart  Based on todays trough of 21 8, will decrease  vancomycin from 1250mg q8h to 1000mg q8h, with a plan for trough to be drawn at 0830 on 7/15  We will continue to follow the patients culture results and clinical progress daily      Otis Law, Pharmacist

## 2021-07-14 NOTE — ASSESSMENT & PLAN NOTE
· Patient presents to the ED with severe low back pain  Found to have b/l lower extremity cellulitis in the setting of IVDU and gram + bacteremia  · CT abdomen and pelvis negative for acute abdominal pathology  · Given ongoing intravenous drug use and bacteremia, diskitis/osteomyelitis/abscess needs to be considered  Refused MRI as she will not remove her nipple piercings and also patient does have retained needle in her neck from prior admission  CT recon  lumbar spine negative  · APS following, continue ATC APAP, lidocaine patch, gabapentin, Robaxin, p r n  Oxycodone 10/15 and intravenous Dilaudid for breakthrough  IV Toradol x 48 hours

## 2021-07-14 NOTE — PROGRESS NOTES
Pt transported to 920 at this time  Charge RN made aware  Belongings with pt  Report called by previous RN, Rochester Hamman

## 2021-07-14 NOTE — ASSESSMENT & PLAN NOTE
· One of 1 blood culture from admission growing Staph aureus  Repeat blood cultures pending  · Continue Ancef and vancomycin per ID for now, follow end points     · Echo pending  · Will need 6 weeks IV abx

## 2021-07-14 NOTE — PROGRESS NOTES
Progress Note - Infectious Disease   Jamal Santacruz 31 y o  female MRN: [id number]  Unit/Bed#: -01 Encounter: [id number]      Impression/Plan:  1  S  Aureus bacteremia   Given the patient's history of IVDA, MSSA bacteremia and TV endocarditis, she will be treated for potential infectious endocarditis  Continue IV vancomycin and IV cefazolin for 6 weeks  Awaiting results from repeat blood cultures  2  Superficial cellulitis of bilateral feet  XRs of bilateral feet revealed no acute osseous abnormality  Clinically she is improving, afebrile and WBC count continues to trend down  Continue current antibiotic treatment  3  Low back pain  Lumbar spine CT does not show osteomyelitis or diskitis  Continue to monitor for worsening symptoms  She appears clinically stable at this time  4  Abnormal abdominal CT  CT showed small foci of ground-glass opacities, likely secondary to aspiration from frequent IV drug use  Patient has no respiratory difficulties and oxygen saturations are at 99%  Antibiotics:  Vancomycin 1250 mg IV Q8H  Cefazolin 2000 mg IV Q8H    Subjective:  Patient is a 31year old female with a history of IVDA, bipolar I disorder, 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 in   She is currently stable with no fever, chills, sweats; no nausea, vomiting, diarrhea; no cough, shortness of breath  No new symptoms today  Her main complaint remains lower back pain and some tenderness on the lateral aspect of bilateral feet  Clinically, she appears to be improving  She was compliant with blood draws, but  refused AM vitals       Objective:  Vitals:  Temp:  [98 5 °F (36 9 °C)] 98 5 °F (36 9 °C)  HR:  [58] 58  Resp:  [16-18] 18  BP: (110)/(57) 110/57  SpO2:  [97 %-99 %] 99 %  Temp (24hrs), Av 5 °F (36 9 °C), Min:98 5 °F (36 9 °C), Max:98 5 °F (36 9 °C)  Current: Temperature:  (Patient refused AM vitals, RN aware )    Physical Exam:   General Appearance:  Lethargic, nontoxic, no acute distress  Lungs:   Clear to auscultation bilaterally; no wheezes, rhonchi or rales; respirations unlabored       Abdomen:   Soft, non-tender, non-distended, positive bowel sounds  Extremities: Diffuse hand and foot edema  Erythema and warmth in bilateral feet has clinically improved  Skin: No new rashes or lesions  No draining wounds noted  Labs, Imaging, & Other studies:   All pertinent labs and imaging studies were personally reviewed  Results from last 7 days   Lab Units 07/14/21  0454 07/13/21  0621 07/11/21  1805   WBC Thousand/uL 8 46 10 34* 13 07*   HEMOGLOBIN g/dL 9 4* 10 1* 11 1*   PLATELETS Thousands/uL 265 247 299     Results from last 7 days   Lab Units 07/14/21  0454 07/13/21  0621 07/11/21  1805   SODIUM mmol/L 137 140 134*   POTASSIUM mmol/L 3 1* 3 2* 3 0*   CHLORIDE mmol/L 106 107 100   CO2 mmol/L 23 26 27   BUN mg/dL 8 4* 10   CREATININE mg/dL 0 78 0 41* 0 52*   EGFR ml/min/1 73sq m 104 141 130   CALCIUM mg/dL 7 5* 7 7* 8 5   AST U/L  --  14 42   ALT U/L  --  32 56   ALK PHOS U/L  --  114 110     Results from last 7 days   Lab Units 07/13/21  0623 07/13/21  0622 07/12/21  0210 07/11/21  1805   BLOOD CULTURE  No Growth at 24 hrs  No Growth at 24 hrs    --  Methicillin Resistant Staphylococcus aureus*   GRAM STAIN RESULT   --   --   --  Gram positive cocci in clusters*   URINE CULTURE   --   --  40,000-49,000 cfu/ml   --      Results from last 7 days   Lab Units 07/13/21  0621 07/12/21  0430   PROCALCITONIN ng/ml 7 79* 0 77*

## 2021-07-14 NOTE — PROGRESS NOTES
[address]  Progress Note - [facility] 1990, 31 y o  female MRN: [id number]  Unit/Bed#: -01 Encounter: [id number]  Primary Care Provider: No primary care provider on file  Date and time admitted to hospital: 7/11/2021  3:46 PM    * Low back pain  Assessment & Plan  · Patient presents to the ED with severe low back pain  Found to have b/l lower extremity cellulitis in the setting of IVDU and gram + bacteremia  · CT abdomen and pelvis negative for acute abdominal pathology  · Given ongoing intravenous drug use and bacteremia, diskitis/osteomyelitis/abscess needs to be considered  Refused MRI as she will not remove her nipple piercings and also patient does have retained needle in her neck from prior admission  CT recon  lumbar spine negative  · APS following, continue ATC APAP, lidocaine patch, gabapentin, Robaxin, p r n  Oxycodone 10/15 and intravenous Dilaudid for breakthrough  IV Toradol x 48 hours  Septic embolism (HCC)  Assessment & Plan  · Small scattered peripheral ground-glass opacities within the bilateral lung bases possibly due to pneumonia versus septic emboli in the setting of intravenous drug use  Id following, also need to consider aspiration from loss of consciousness from intravenous drug intoxication  · COVID-19 PCR negative  · No respiratory complaints and oxygen saturation adequate on room air  Cellulitis of lower extremity  Assessment & Plan  · Bilateral great toe erythema/swelling and significant tenderness  Slight improvement today  · X-rays negative  · Intravenous antibiotics with vancomycin/ceftriaxone  · ID following  · Serial exams    Gram-positive bacteremia  Assessment & Plan  · One of 1 blood culture from admission growing Staph aureus  Repeat blood cultures pending  · Continue Ancef and vancomycin per ID for now, follow end points     · Echo pending  · Will need 6 weeks IV abx    Heroin abuse [facility])  Assessment & Plan  · Patient admits to recent intravenous cocaine and fentanyl use  · Monitor closely for withdrawal   Continue clonidine 0 2 b i d  · CM following, refused HOST  Chronic anticoagulation  Assessment & Plan  · Continue Eliquis 5 mg twice daily  Bipolar 1 disorder (HCC)  Assessment & Plan  · Continue Abilify 10 mg daily  · Klonopin 0 5 mg twice daily  · Prazosin 1 mg q h s     History of endocarditis  Assessment & Plan  · 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   Patient completed six week course of antibiotics at this time  · Now with positive blood cultures again  Echo pending  VTE Pharmacologic Prophylaxis:   Moderate Risk (Score 3-4) - Pharmacological DVT Prophylaxis Ordered: apixaban (Eliquis)  Patient Centered Rounds: I performed bedside rounds with nursing staff today  Discussions with Specialists or Other Care Team Provider: nursing, case management     Education and Discussions with Family / Patient: Patient declined call to   Time Spent for Care: 30 minutes  More than 50% of total time spent on counseling and coordination of care as described above  Current Length of Stay: 2 day(s)  Current Patient Status: Inpatient   Certification Statement: The patient will continue to require additional inpatient hospital stay due to IV abx  Discharge Plan: Anticipate discharge in >72 hrs to TBD    Code Status: Level 1 - Full Code    Subjective:   Continues to complain of low back and bilateral foot pain  Yesterday upon discussion, she was agreeable to drug rehab on discharge however is now refusing  Understands she will need to remain in the hospital for 6 weeks from negative blood culture  At this point, she is agreeable to do so      Objective:     Vitals:   Temp (24hrs), Av 5 °F (36 9 °C), Min:98 5 °F (36 9 °C), Max:98 5 °F (36 9 °C)    Temp:  [98 5 °F (36 9 °C)] 98 5 °F (36 9 °C)  HR:  [58] 58  Resp:  [16-18] 18  BP: (110)/(57) 110/57  SpO2:  [97 %-99 %] 99 %  Body mass index is 23 03 kg/m²  Input and Output Summary (last 24 hours): Intake/Output Summary (Last 24 hours) at 7/14/2021 0901  Last data filed at 7/14/2021 0601  Gross per 24 hour   Intake 1846 98 ml   Output 700 ml   Net 1146 98 ml       Physical Exam:   Physical Exam  Vitals and nursing note reviewed  Constitutional:       General: She is not in acute distress  Cardiovascular:      Rate and Rhythm: Normal rate  Pulmonary:      Breath sounds: Decreased breath sounds (Poor effort) present  Abdominal:      Tenderness: There is no abdominal tenderness  Musculoskeletal:         General: Swelling (Bilateral lower extremities ) present  Skin:     General: Skin is warm  Findings: Erythema (Bilateral great toes) present  Comments: Generalized scabs/needle sticks   Neurological:      Mental Status: She is alert and oriented to person, place, and time  Mental status is at baseline           Additional Data:     Labs:  Results from last 7 days   Lab Units 07/14/21  0454 07/13/21  0621   WBC Thousand/uL 8 46 10 34*   HEMOGLOBIN g/dL 9 4* 10 1*   HEMATOCRIT % 28 4* 30 8*   PLATELETS Thousands/uL 265 247   BANDS PCT % 6  --    NEUTROS PCT %  --  80*   LYMPHS PCT %  --  6*   LYMPHO PCT % 14  --    MONOS PCT %  --  13*   MONO PCT % 2*  --    EOS PCT % 0 0     Results from last 7 days   Lab Units 07/14/21  0454 07/13/21  0621   SODIUM mmol/L 137 140   POTASSIUM mmol/L 3 1* 3 2*   CHLORIDE mmol/L 106 107   CO2 mmol/L 23 26   BUN mg/dL 8 4*   CREATININE mg/dL 0 78 0 41*   ANION GAP mmol/L 8 7   CALCIUM mg/dL 7 5* 7 7*   ALBUMIN g/dL  --  2 2*   TOTAL BILIRUBIN mg/dL  --  0 44   ALK PHOS U/L  --  114   ALT U/L  --  32   AST U/L  --  14   GLUCOSE RANDOM mg/dL 152* 95     Results from last 7 days   Lab Units 07/12/21  0430   INR  1 17             Results from last 7 days   Lab Units 07/13/21  0621 07/12/21  0430 07/11/21  1805   LACTIC ACID mmol/L  -- --  1 2   PROCALCITONIN ng/ml 7 79* 0 77*  --        Lines/Drains:  Invasive Devices     Peripheral Intravenous Line            Peripheral IV 07/11/21 Right;Upper;Medial Arm 2 days                      Imaging: No pertinent imaging reviewed  Recent Cultures (last 7 days):   Results from last 7 days   Lab Units 07/13/21  0623 07/13/21  0622 07/12/21  0210 07/11/21  1805   BLOOD CULTURE  Received in Microbiology Lab  Culture in Progress  Received in Microbiology Lab  Culture in Progress    --  Staphylococcus aureus*   GRAM STAIN RESULT   --   --   --  Gram positive cocci in clusters*   URINE CULTURE   --   --  40,000-49,000 cfu/ml   --        Last 24 Hours Medication List:   Current Facility-Administered Medications   Medication Dose Route Frequency Provider Last Rate    acetaminophen  975 mg Oral Q8H [address] Olga BOO Wick      aluminum-magnesium hydroxide-simethicone  30 mL Oral Q6H PRN Marco Antonio Saavedra MD      apixaban  5 mg Oral BID Marco Antonio Saavedra MD      ARIPiprazole  10 mg Oral Daily Marco Antonio Saavedra MD      cefazolin  2,000 mg Intravenous Q8H Shakir Lam MD Stopped (07/14/21 4537)    clonazePAM  0 5 mg Oral BID Olga Fam, BOO      cloNIDine  0 1 mg Oral Q12H [address] Olga BOO Wick      docusate sodium  100 mg Oral BID PRN Marco Antonio Saavedra MD      gabapentin  200 mg Oral BID Olga BOO Wick      HYDROmorphone  0 5 mg Intravenous Q3H PRN BOO Kay      ketorolac  15 mg Intravenous Q6H [address] Olga SARAH SternoBOO      methocarbamol  750 mg Oral Q6H PRN David Bhardwaj PA-C      nicotine  1 patch Transdermal Daily Marco Antonio Saavedra MD      ondansetron  4 mg Intravenous Q6H PRN Marco Antonio Saavedra MD      oxyCODONE  10 mg Oral Q4H PRN Amber Brandt Boast, CRNP      oxyCODONE  15 mg Oral Q4H PRN BOO Kay      polyethylene glycol  17 g Oral Daily BOO Kay      potassium chloride  40 mEq Oral Once Suraj Mcfadden PA-C      potassium chloride  40 mEq Oral Once BOO George      potassium chloride  20 mEq Intravenous Once [place], PA-C 20 mEq (07/14/21 0834)    prazosin  1 mg Oral ZOILA Sims MD      vancomycin  1,250 mg Intravenous Tita Horton MD          Today, Patient Was Seen By: BOO George    **Please Note: This note may have been constructed using a voice recognition system  **

## 2021-07-14 NOTE — CASE MANAGEMENT
LOS 5  Not a bundle   Not a readmission  Unplanned Readmission Risk Score: 60    CM met with pt at bedside to review role of CM and discuss pt's needs upon discharge  Pt agreeable to SW assessment today, presented with an agreeable but restless demeanor  Pt states that she resides with "family" in a house with 2 MARY; Pt reports no challenges with ambulating up stairs, and denies hx of DME/HHC/SNF  Pt drives, but has a pending court date for a DUI charge -- Pt refused to disclose further legal involvement  Pt denies hx of MI, including IP or OP stays, but reports hx of tx for D&SA -- Heroin is pt's substance of choice  Pt reports numerous IP stays in detox centers -- "More than I can count " Pt agreeable to HOST referral today -- Referral made referral to HOST via  as there was no answer ([phone])  Pt's substance of choice is heroin  Pt has extensive hx of substance abuse and leaving hospital facilities [facility] -- Treatment team monitoring non-compliance  Pt unable to identify PCP -- "I can't think straight "  Pt agreeable to [address] at d/c  Pt does not have a POA -- Reports mom (Nehal Olsen) is emergency contact and alternate decision maker ([phone])    Pt will likely discharge to rehab -- Pt agreeable to such, but may ultimately decline "depending how long I stay here " Pt expresses hesitancy to accepting D&SA rehab if she stays "more than a month " Pt currently expected to remain hospitalized IP for 6 weeks for IV abx administration  Pt asked CM to fax admittance letter to [facility] to excuse pt's absence for DUI court -- Excuse letter faxed to court representative Bianka Fields ([phone]) via fax ([phone])  Pt thanked CM  CM will continue to follow treatment team's recommendations and plan for discharge accordingly, in collaboration with pt and family       CM reviewed d/c planning process including the following: identifying help at home, patient preference for d/c planning needs, Discharge [place] Med[facility] to [facility] program, availability of treatment team to discuss questions or concerns patient and/or family may have regarding understanding medications and recognizing signs and symptoms once discharged  CM also encouraged patient to follow up with all recommended appointments after discharge  Patient advised of importance for patient and family to participate in managing patients medical well being  Patient/caregiver received discharge checklist   Content reviewed  Patient/caregiver encouraged to participate in discharge plan of care prior to discharge home

## 2021-07-15 ENCOUNTER — APPOINTMENT (INPATIENT)
Dept: NON INVASIVE DIAGNOSTICS | Facility: HOSPITAL | Age: 31
DRG: 710 | End: 2021-07-15
Payer: COMMERCIAL

## 2021-07-15 PROBLEM — B95.62 MRSA BACTEREMIA: Status: ACTIVE | Noted: 2021-07-12

## 2021-07-15 PROCEDURE — 99232 SBSQ HOSP IP/OBS MODERATE 35: CPT | Performed by: NURSE PRACTITIONER

## 2021-07-15 PROCEDURE — NC001 PR NO CHARGE: Performed by: INTERNAL MEDICINE

## 2021-07-15 PROCEDURE — 99233 SBSQ HOSP IP/OBS HIGH 50: CPT | Performed by: INTERNAL MEDICINE

## 2021-07-15 RX ORDER — GABAPENTIN 100 MG/1
100 CAPSULE ORAL ONCE
Status: COMPLETED | OUTPATIENT
Start: 2021-07-15 | End: 2021-07-15

## 2021-07-15 RX ADMIN — CLONIDINE HYDROCHLORIDE 0.1 MG: 0.1 TABLET ORAL at 07:51

## 2021-07-15 RX ADMIN — CLONAZEPAM 0.5 MG: 0.5 TABLET ORAL at 15:55

## 2021-07-15 RX ADMIN — OXYCODONE HYDROCHLORIDE 15 MG: 10 TABLET ORAL at 07:51

## 2021-07-15 RX ADMIN — CLONIDINE HYDROCHLORIDE 0.1 MG: 0.1 TABLET ORAL at 01:03

## 2021-07-15 RX ADMIN — KETOROLAC TROMETHAMINE 15 MG: 30 INJECTION, SOLUTION INTRAMUSCULAR; INTRAVENOUS at 00:11

## 2021-07-15 RX ADMIN — HYDROMORPHONE HYDROCHLORIDE 0.5 MG: 1 INJECTION, SOLUTION INTRAMUSCULAR; INTRAVENOUS; SUBCUTANEOUS at 00:11

## 2021-07-15 RX ADMIN — GABAPENTIN 200 MG: 100 CAPSULE ORAL at 07:51

## 2021-07-15 RX ADMIN — APIXABAN 5 MG: 5 TABLET, FILM COATED ORAL at 15:56

## 2021-07-15 RX ADMIN — OXYCODONE HYDROCHLORIDE 15 MG: 10 TABLET ORAL at 01:01

## 2021-07-15 RX ADMIN — HYDROMORPHONE HYDROCHLORIDE 0.5 MG: 1 INJECTION, SOLUTION INTRAMUSCULAR; INTRAVENOUS; SUBCUTANEOUS at 21:34

## 2021-07-15 RX ADMIN — OXYCODONE HYDROCHLORIDE 15 MG: 10 TABLET ORAL at 20:09

## 2021-07-15 RX ADMIN — OXYCODONE HYDROCHLORIDE 15 MG: 10 TABLET ORAL at 11:47

## 2021-07-15 RX ADMIN — VANCOMYCIN HYDROCHLORIDE 1250 MG: 10 INJECTION, POWDER, LYOPHILIZED, FOR SOLUTION INTRAVENOUS at 09:10

## 2021-07-15 RX ADMIN — HYDROMORPHONE HYDROCHLORIDE 0.5 MG: 1 INJECTION, SOLUTION INTRAMUSCULAR; INTRAVENOUS; SUBCUTANEOUS at 09:07

## 2021-07-15 RX ADMIN — CLONAZEPAM 0.5 MG: 0.5 TABLET ORAL at 07:51

## 2021-07-15 RX ADMIN — METHOCARBAMOL TABLETS 750 MG: 750 TABLET, COATED ORAL at 00:22

## 2021-07-15 RX ADMIN — VANCOMYCIN HYDROCHLORIDE 1250 MG: 10 INJECTION, POWDER, LYOPHILIZED, FOR SOLUTION INTRAVENOUS at 16:06

## 2021-07-15 RX ADMIN — VANCOMYCIN HYDROCHLORIDE 1250 MG: 10 INJECTION, POWDER, LYOPHILIZED, FOR SOLUTION INTRAVENOUS at 01:00

## 2021-07-15 RX ADMIN — KETOROLAC TROMETHAMINE 15 MG: 30 INJECTION, SOLUTION INTRAMUSCULAR; INTRAVENOUS at 11:50

## 2021-07-15 RX ADMIN — ACETAMINOPHEN 975 MG: 325 TABLET, FILM COATED ORAL at 13:44

## 2021-07-15 RX ADMIN — HYDROMORPHONE HYDROCHLORIDE 0.5 MG: 1 INJECTION, SOLUTION INTRAMUSCULAR; INTRAVENOUS; SUBCUTANEOUS at 15:50

## 2021-07-15 RX ADMIN — APIXABAN 5 MG: 5 TABLET, FILM COATED ORAL at 07:51

## 2021-07-15 RX ADMIN — GABAPENTIN 200 MG: 100 CAPSULE ORAL at 15:56

## 2021-07-15 RX ADMIN — GABAPENTIN 100 MG: 100 CAPSULE ORAL at 01:01

## 2021-07-15 RX ADMIN — CLONIDINE HYDROCHLORIDE 0.1 MG: 0.1 TABLET ORAL at 21:34

## 2021-07-15 NOTE — ASSESSMENT & PLAN NOTE
· 1 of 1 blood culture from admission growing MRSA  Repeat blood cultures negative at 24 hours  Will plan for PICC line once negative at 72 hours  · Continue vancomycin per ID for now, follow end points     · Echo pending  · Will need 6 weeks IV abx from negative blood culture

## 2021-07-15 NOTE — PHYSICAL THERAPY NOTE
Physical Therapy Cancellation Note       07/15/21 1050   PT Last Visit   PT Visit Date 07/15/21   Note Type   Note type Evaluation   Cancel Reasons Other     PT orders received, pt chart reviewed  Pt currently refusing OOB mobility 2* increased low back pain/ fatigue  Pt educated on importance of OOB mobility- pt not receptive  PT to continue to follow and see pt as appropriate and able       Brooke Mayberry, PT, DPT

## 2021-07-15 NOTE — PROGRESS NOTES
Vancomycin IV Pharmacy-to-Dose Consultation    Bernard Ryan is a 31 y o  female who is currently receiving Vancomycin IV with management by the Pharmacy Consult service  Assessment/Plan:  The patient was reviewed  Most recent level was therapeutic, however creatinine increased to 0 78 on 7/14 from 0 41  Another trough was ordered for 7/15 at 0830 which patient refused  Based on todays assessment, continue current vancomycin (day # 3) dosing of 1250 mg IV q8h, with a plan for trough to be drawn at 0830 on 7/16  If patient continues to refuse levels, provider will be contacted again  We will continue to follow the patients culture results and clinical progress daily      Liu Mccullough, Pharmacist

## 2021-07-15 NOTE — PROGRESS NOTES
[address] [facility]  Progress Note - Mouna Stevenson 1990, 31 y o  female MRN: [id number]  Unit/Bed#: [facility] 920-01 Encounter: [id number]  Primary Care Provider: No primary care provider on file  Date and time admitted to hospital: 7/11/2021  3:46 PM    * Low back pain  Assessment & Plan  · Patient presents to the ED with severe low back pain  Found to have b/l lower extremity cellulitis in the setting of IVDU and gram + bacteremia  · CT abdomen and pelvis negative for acute abdominal pathology  · Given ongoing intravenous drug use and bacteremia, diskitis/osteomyelitis/abscess needs to be considered  Refused MRI as she will not remove her nipple piercings and also patient does have retained needle in her neck from prior admission  CT recon  lumbar spine negative  · APS following, continue ATC APAP, lidocaine patch, gabapentin, Robaxin, p r n  Oxycodone 10/15 and intravenous Dilaudid for breakthrough  IV Toradol x 48 hours  Septic embolism (HCC)  Assessment & Plan  · Small scattered peripheral ground-glass opacities within the bilateral lung bases possibly due to pneumonia versus septic emboli in the setting of intravenous drug use  Id following, also need to consider aspiration from loss of consciousness from intravenous drug intoxication  · COVID-19 PCR negative  · No respiratory complaints and oxygen saturation adequate on room air  Cellulitis of lower extremity  Assessment & Plan  · Bilateral great toe erythema/swelling and significant tenderness  Slight improvement today  · X-rays negative  · Intravenous antibiotics with vancomycin  · ID following  · Serial exams    MRSA bacteremia  Assessment & Plan  · 1 of 1 blood culture from admission growing MRSA  Repeat blood cultures negative at 24 hours  Will plan for PICC line once negative at 72 hours  · Continue vancomycin per ID for now, follow end points     · Echo pending  · Will need 6 weeks IV abx from negative blood culture    Heroin abuse ([facility], [place] [address] )  Assessment & Plan  · Patient admits to recent intravenous cocaine and fentanyl use  · Monitor closely for withdrawal   Continue clonidine 0 2 b i d  · CM following, refused HOST  Chronic anticoagulation  Assessment & Plan  · Continue Eliquis 5 mg twice daily  Bipolar 1 disorder (HCC)  Assessment & Plan  · Continue Abilify 10 mg daily  · Klonopin 0 5 mg twice daily  · Prazosin 1 mg q h s     History of endocarditis  Assessment & Plan  · 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   Patient completed six week course of antibiotics at this time  · Now with positive blood cultures again  Echo pending  No need for MELISSA as would not   VTE Pharmacologic Prophylaxis:   Moderate Risk (Score 3-4) - Pharmacological DVT Prophylaxis Ordered: apixaban (Eliquis)  Patient Centered Rounds: I performed bedside rounds with nursing staff today  Discussions with Specialists or Other Care Team Provider: nursing, case management     Education and Discussions with Family / Patient: Patient declined call to   Time Spent for Care: 30 minutes  More than 50% of total time spent on counseling and coordination of care as described above  Current Length of Stay: 3 day(s)  Current Patient Status: Inpatient   Certification Statement: The patient will continue to require additional inpatient hospital stay due to will need 6 weeks IV abx  Discharge Plan: Anticipate discharge in >72 hrs to To be determined    Code Status: Level 1 - Full Code    Subjective:   Patient continues to complain of low back pain  No new complaints  Refused labs today  Objective:     Vitals:   Temp (24hrs), Av 6 °F (37 °C), Min:98 6 °F (37 °C), Max:98 6 °F (37 °C)    Temp:  [98 6 °F (37 °C)] 98 6 °F (37 °C)  HR:  [50] 50  Resp:  [20] 20  BP: (113)/(63) 113/63  Body mass index is 23 51 kg/m²       Input and Output Summary (last  hours): Intake/Output Summary (Last 24 hours) at 7/15/2021 0938  Last data filed at 7/15/2021 0257  Gross per 24 hour   Intake 550 ml   Output 0 ml   Net 550 ml       Physical Exam:   Physical Exam  Vitals and nursing note reviewed  Constitutional:       General: She is not in acute distress  Cardiovascular:      Rate and Rhythm: Normal rate  Pulmonary:      Breath sounds: Decreased breath sounds (Poor effort) present  Abdominal:      Tenderness: There is no abdominal tenderness  Musculoskeletal:         General: Swelling (Bilateral feet) and tenderness (Low back) present  Skin:     General: Skin is warm  Findings: Erythema (Bilateral great toes) present  Comments: Generalized scratches/scabs, needle sticks  Neurological:      Mental Status: She is alert and oriented to person, place, and time  Mental status is at baseline            Additional Data:     Labs:  Results from last 7 days   Lab Units 07/14/21 0454 07/13/21 0621   WBC Thousand/uL 8 46 10 34*   HEMOGLOBIN g/dL 9 4* 10 1*   HEMATOCRIT % 28 4* 30 8*   PLATELETS Thousands/uL 265 247   BANDS PCT % 6  --    NEUTROS PCT %  --  80*   LYMPHS PCT %  --  6*   LYMPHO PCT % 14  --    MONOS PCT %  --  13*   MONO PCT % 2*  --    EOS PCT % 0 0     Results from last 7 days   Lab Units 07/14/21  0454 07/13/21  0621   SODIUM mmol/L 137 140   POTASSIUM mmol/L 3 1* 3 2*   CHLORIDE mmol/L 106 107   CO2 mmol/L 23 26   BUN mg/dL 8 4*   CREATININE mg/dL 0 78 0 41*   ANION GAP mmol/L 8 7   CALCIUM mg/dL 7 5* 7 7*   ALBUMIN g/dL  --  2 2*   TOTAL BILIRUBIN mg/dL  --  0 44   ALK PHOS U/L  --  114   ALT U/L  --  32   AST U/L  --  14   GLUCOSE RANDOM mg/dL 152* 95     Results from last 7 days   Lab Units 07/12/21  0430   INR  1 17             Results from last 7 days   Lab Units 07/13/21  0621 07/12/21  0430 07/11/21  1805   LACTIC ACID mmol/L  --   --  1 2   PROCALCITONIN ng/ml 7 79* 0 77*  --        Lines/Drains:  Invasive Devices     Peripheral Intravenous Line            Peripheral IV 07/11/21 Right;Upper;Medial Arm 3 days                      Imaging: No pertinent imaging reviewed  Recent Cultures (last 7 days):   Results from last 7 days   Lab Units 07/13/21  0623 07/13/21  0622 07/12/21  0210 07/11/21  1805   BLOOD CULTURE  No Growth at 24 hrs  No Growth at 24 hrs    --  Methicillin Resistant Staphylococcus aureus*   GRAM STAIN RESULT   --   --   --  Gram positive cocci in clusters*   URINE CULTURE   --   --  40,000-49,000 cfu/ml   --        Last 24 Hours Medication List:   Current Facility-Administered Medications   Medication Dose Route Frequency Provider Last Rate    acetaminophen  975 mg Oral Q8H [address] BOO Kay      aluminum-magnesium hydroxide-simethicone  30 mL Oral Q6H PRN Jose David Mane MD      apixaban  5 mg Oral BID Jose David Mane MD      ARIPiprazole  10 mg Oral Daily Jose David Mane MD      clonazePAM  0 5 mg Oral BID BOO Kay      cloNIDine  0 1 mg Oral Q12H [address] BOO Kay      docusate sodium  100 mg Oral BID PRN Jose David Mane MD      gabapentin  200 mg Oral BID BOO Kay      HYDROmorphone  0 5 mg Intravenous Q6H PRN BOO Kay      ketorolac  15 mg Intravenous Q6H [address] BOO Kay      methocarbamol  750 mg Oral Q6H PRN Tierra Hatch PA-C      nicotine  1 patch Transdermal Daily Jose David Mane MD      ondansetron  4 mg Intravenous Q6H PRN Jose David Mane MD      oxyCODONE  10 mg Oral Q4H PRN BOO Fisher      oxyCODONE  15 mg Oral Q4H PRN BOO Kay      polyethylene glycol  17 g Oral Daily BOO Kay      potassium chloride  40 mEq Oral Once Suraj Mcfadden PA-C      prazosin  1 mg Oral HS Jose David Mane MD      vancomycin  1,250 mg Intravenous Q8H Roberto Miller MD 1,250 mg (07/15/21 0910)        Today, Patient Was Seen By: BOO Cross    **Please Note: This note may have been constructed using a voice recognition system  **

## 2021-07-15 NOTE — PROGRESS NOTES
ICU RN at bedside to attempt ultrasound guided IV stick and blood draw  This was refused by the patient at this time, stating it was "too early", I explained to the patient the importance of changing her IV and getting her AM labs  Not tolerated well by patient

## 2021-07-15 NOTE — PROGRESS NOTES
Progress Note - Infectious Disease   Dontae Vegas 31 y o  female MRN: [id number]  Unit/Bed#: [facility] 920-01 Encounter: [id number]      Impression/Plan:  1  S  Aureus bacteremia  A single blood culture was drawn in the ED, which was positive for Staph aureus  Given the patient's history of IVDA, MSSA bacteremia and TV endocarditis, she will be treated for potential infectious endocarditis with IV vancomycin for 6 weeks  Repeat blood cultures drawn on  show no growth at 24 hours  2  Bilateral feet cellulitis  Cellulitis is superficial, without purulent drainage or abscesses  XR of bilateral feet showed no osseous abnormalities  Patient is clinically improving and shows no signs of worsening infection  Cefazolin will be discontinued at this time and continue with IV vancomycin  3  Low back pain  Patient shows no neurological deficits  CT of lumbar spine showed no signs of osteomyelitis or diskitis  Continue pain control and monitor for worsening symptoms  4  Abnormal abdominal CT  CT showed small foci of ground-glass opacities  This is likely secondary to aspiration from her history of IV drug intoxication  Patient shows no signs of respiratory difficulty  Continue to monitor for changes in respiratory symptoms  Antibiotics:  Vancomycin    Subjective:  Patient refused to answer questions this morning and stated that she wanted to sleep  She has no chills, sweats; no nausea, vomiting, diarrhea; no cough, shortness of breath  No new symptoms  She appears comfortable and non-toxic  Objective:  Vitals:  Temp:  [98 6 °F (37 °C)] 98 6 °F (37 °C)  HR:  [50] 50  Resp:  [20] 20  BP: (113)/(63) 113/63  Temp (24hrs), Av 6 °F (37 °C), Min:98 6 °F (37 °C), Max:98 6 °F (37 °C)  Current: Temperature:  (patient refused to get any vitals)    Physical Exam:   General Appearance:  Lethargic, nontoxic, no acute distress     Lungs:  Clear to auscultation bilaterally; no wheezes, rhonchi or rales; respirations unlabored   Heart: RRR; stable systolic murmur in LUSB  Abdomen: Soft, non-tender, non-distended   Extremities: Improved erythema of bilateral feet with diffuse edema  Skin: No new rashes or lesions  No draining wounds noted  Labs, Imaging, & Other studies:   All pertinent labs and imaging studies were personally reviewed  Results from last 7 days   Lab Units 07/14/21  0454 07/13/21  0621 07/11/21  1805   WBC Thousand/uL 8 46 10 34* 13 07*   HEMOGLOBIN g/dL 9 4* 10 1* 11 1*   PLATELETS Thousands/uL 265 247 299     Results from last 7 days   Lab Units 07/14/21  0454 07/13/21  0621 07/11/21  1805   SODIUM mmol/L 137 140 134*   POTASSIUM mmol/L 3 1* 3 2* 3 0*   CHLORIDE mmol/L 106 107 100   CO2 mmol/L 23 26 27   BUN mg/dL 8 4* 10   CREATININE mg/dL 0 78 0 41* 0 52*   EGFR ml/min/1 73sq m 104 141 130   CALCIUM mg/dL 7 5* 7 7* 8 5   AST U/L  --  14 42   ALT U/L  --  32 56   ALK PHOS U/L  --  114 110     Results from last 7 days   Lab Units 07/13/21  0623 07/13/21  0622 07/12/21  0210 07/11/21  1805   BLOOD CULTURE  No Growth at 24 hrs  No Growth at 24 hrs    --  Methicillin Resistant Staphylococcus aureus*   GRAM STAIN RESULT   --   --   --  Gram positive cocci in clusters*   URINE CULTURE   --   --  40,000-49,000 cfu/ml   --      Results from last 7 days   Lab Units 07/13/21  0621 07/12/21  0430   PROCALCITONIN ng/ml 7 79* 0 77*

## 2021-07-15 NOTE — OCCUPATIONAL THERAPY NOTE
Occupational Therapy Cancellation        Patient Name: Little Roman  ZMNEK'N Date: 7/15/2021       07/15/21 1049   OT Last Visit   OT Visit Date 07/15/21   Note Type   Note type Evaluation   Cancel Reasons Other       OT orders received  Chart reviewed  Attempted to see pt for OT evaluation  Pt currently unable to participate in session at this time 2* fatigue/lethargy, pt also c/o of LBP  Encouraged OOB in chair for pt, currently refusing  Will continue to follow and evaulate/treat pt as appropriate       Foreign Leong MS, OTR/L

## 2021-07-15 NOTE — ASSESSMENT & PLAN NOTE
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

## 2021-07-15 NOTE — PROGRESS NOTES
Pt c/o pelvic discomfort and some vaginal spotting s/p elective  at the end of   Dr Gricelda Saavedra with [facility] made aware  Dr Gricelda Saavedra forwarded message to Sonya

## 2021-07-15 NOTE — PROGRESS NOTES
Progress Note - Infectious Disease   Westley Finney 31 y o  female MRN: [id number]  Unit/Bed#: [facility] 920-01 Encounter: [id number]      Impression/Recommendations:  1   MRSA bacteremia  Angus Slough a single blood culture was drawn at admission  Ghazala Zuniga, it is difficult to be certain whether this is true bacteremia versus contaminated blood draw  Repeat blood cultures have no growth thus far but patient had been on vancomycin prior to repeat blood cultures  Yadi Victoriano active IVDU, history of TV endocarditis and status post TV repair with annuloplasty, will have to treat this is true bacteremia  Continue IV vancomycin  Follow-up on repeat blood cultures for clearance of bacteremia  Follow-up 2D echo  Treat x6 weeks total     As long as repeat blood cultures have no growth, will not pursue MELISSA since it will not change antibiotic plan  Okay for PICC 7/16, if repeat blood cultures remain negative      2  Bilateral feet cellulitis, most likely secondary to active IVDU   Cellulitis is superficial, without purulence drainage done without clinical signs of involvement of deeper structures  No evidence of septic MTP joint clinically   Patient has history of MSSA infection previously   She is clinically improved   Patient remains systemically well, without clinical signs of sepsis or systemic toxicity    Antibiotic plan as in above  Serial feet exams  Monitor area over left 1st MTP closely  Monitor temperature/WBC      3  Low back pain   Exam is relatively benign   No neurological deficit   Lumbar spine CT without evidence of vertebral osteomyelitis/diskitis or paraspinal infection  Monitor low back pain for now  Pain control per primary service      4  Abnormal lower lobes of lungs on abdomen/pelvis CT   CT showed small foci of ground-glass opacities, without cavitation or consolidation   This may be all secondary to aspiration from loss of consciousness from IV drug intoxication   Patient has no respiratory symptoms   She has no hypoxia  No antibiotic needed for this for now  Monitor respiratory symptoms  Monitor O2 saturation      5  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   Patient is status post TV repair with annuloplasty   Concern for endocarditis, as in above  Antibiotic plan as in above      6  Active IVDU, with noncompliance to care and with multiple episodes of leaving [place] is not a candidate for home IV antibiotic, if she needs prolonged IV antibiotic treatment course   Patient is at risk for drug withdrawal      Discussed with patient in detail regarding the above plan      Antibiotics:  Vancomycin  Antibiotic # 4                    Patient is more awake and alert, but more agitated  Improved low back pain  Improved foot pain  No further chills      Objective:  Vitals:  Temp:  [98 6 °F (37 °C)] 98 6 °F (37 °C)  HR:  [50] 50  Resp:  [20] 20  BP: (113)/(63) 113/63  Temp (24hrs), Av 6 °F (37 °C), Min:98 6 °F (37 °C), Max:98 6 °F (37 °C)  Current: Temperature:  (patient refused to get any vitals)    Physical Exam:     General: Awake, alert, cooperative, no distress  Neck:  Supple  No mass  No lymphadenopathy  Lungs: Expansion symmetric, no rales, no wheezing, respirations unlabored  Heart:  Regular rate and rhythm, S1 and S2 normal, no murmur  Abdomen: Soft, nondistended, non-tender, bowel sounds active all four quadrants, no masses, no organomegaly  Extremities: No edema  Erythema in foot resolved except for an area over left 1st MTP  No obvious joint effusion  ROM of left big toe is good  No fluctuance  Improved tenderness  Skin:  No rash  Neuro: Moves all extremities  Invasive Devices     Peripheral Intravenous Line            Peripheral IV 21 Right;Upper;Medial Arm 3 days                Labs studies:   I have personally reviewed pertinent labs    Results from last 7 days   Lab Units 21  0454 21  0621 21  1805   POTASSIUM mmol/L 3 1* 3 2* 3 0*   CHLORIDE mmol/L 106 107 100   CO2 mmol/L 23 26 27   BUN mg/dL 8 4* 10   CREATININE mg/dL 0 78 0 41* 0 52*   EGFR ml/min/1 73sq m 104 141 130   CALCIUM mg/dL 7 5* 7 7* 8 5   AST U/L  --  14 42   ALT U/L  --  32 56   ALK PHOS U/L  --  114 110     Results from last 7 days   Lab Units 07/14/21  0454 07/13/21  0621 07/11/21  1805   WBC Thousand/uL 8 46 10 34* 13 07*   HEMOGLOBIN g/dL 9 4* 10 1* 11 1*   PLATELETS Thousands/uL 265 247 299     Results from last 7 days   Lab Units 07/13/21  0623 07/13/21  0622 07/12/21  0210 07/11/21  1805   BLOOD CULTURE  No Growth at 48 hrs  No Growth at 48 hrs  --  Methicillin Resistant Staphylococcus aureus*   GRAM STAIN RESULT   --   --   --  Gram positive cocci in clusters*   URINE CULTURE   --   --  40,000-49,000 cfu/ml   --        Imaging Studies:   I have personally reviewed pertinent imaging study reports and images in PACS  EKG, Pathology, and Other Studies:   I have personally reviewed pertinent reports

## 2021-07-15 NOTE — ASSESSMENT & PLAN NOTE
· Bilateral great toe erythema/swelling and significant tenderness  Slight improvement today    · X-rays negative  · Intravenous antibiotics with vancomycin  · ID following  · Serial exams

## 2021-07-16 ENCOUNTER — APPOINTMENT (INPATIENT)
Dept: NON INVASIVE DIAGNOSTICS | Facility: HOSPITAL | Age: 31
DRG: 710 | End: 2021-07-16
Payer: COMMERCIAL

## 2021-07-16 PROBLEM — L02.91 ABSCESS: Status: ACTIVE | Noted: 2021-07-16

## 2021-07-16 LAB — VANCOMYCIN TROUGH SERPL-MCNC: 14.9 UG/ML (ref 10–20)

## 2021-07-16 PROCEDURE — 99254 IP/OBS CNSLTJ NEW/EST MOD 60: CPT | Performed by: SURGERY

## 2021-07-16 PROCEDURE — 93321 DOPPLER ECHO F-UP/LMTD STD: CPT | Performed by: INTERNAL MEDICINE

## 2021-07-16 PROCEDURE — 93308 TTE F-UP OR LMTD: CPT | Performed by: INTERNAL MEDICINE

## 2021-07-16 PROCEDURE — 99232 SBSQ HOSP IP/OBS MODERATE 35: CPT | Performed by: PHYSICIAN ASSISTANT

## 2021-07-16 PROCEDURE — 99232 SBSQ HOSP IP/OBS MODERATE 35: CPT | Performed by: NURSE PRACTITIONER

## 2021-07-16 PROCEDURE — 99233 SBSQ HOSP IP/OBS HIGH 50: CPT | Performed by: INTERNAL MEDICINE

## 2021-07-16 PROCEDURE — 02HV33Z INSERTION OF INFUSION DEVICE INTO SUPERIOR VENA CAVA, PERCUTANEOUS APPROACH: ICD-10-PCS | Performed by: INTERNAL MEDICINE

## 2021-07-16 PROCEDURE — 93308 TTE F-UP OR LMTD: CPT

## 2021-07-16 PROCEDURE — 36569 INSJ PICC 5 YR+ W/O IMAGING: CPT

## 2021-07-16 PROCEDURE — C1751 CATH, INF, PER/CENT/MIDLINE: HCPCS

## 2021-07-16 PROCEDURE — 80202 ASSAY OF VANCOMYCIN: CPT | Performed by: INTERNAL MEDICINE

## 2021-07-16 PROCEDURE — 93325 DOPPLER ECHO COLOR FLOW MAPG: CPT | Performed by: INTERNAL MEDICINE

## 2021-07-16 RX ORDER — LIDOCAINE HYDROCHLORIDE 10 MG/ML
10 INJECTION, SOLUTION EPIDURAL; INFILTRATION; INTRACAUDAL; PERINEURAL ONCE
Status: COMPLETED | OUTPATIENT
Start: 2021-07-16 | End: 2021-07-16

## 2021-07-16 RX ADMIN — OXYCODONE HYDROCHLORIDE 15 MG: 10 TABLET ORAL at 04:39

## 2021-07-16 RX ADMIN — GABAPENTIN 200 MG: 100 CAPSULE ORAL at 17:42

## 2021-07-16 RX ADMIN — NICOTINE 1 PATCH: 21 PATCH, EXTENDED RELEASE TRANSDERMAL at 08:35

## 2021-07-16 RX ADMIN — OXYCODONE HYDROCHLORIDE 15 MG: 10 TABLET ORAL at 17:41

## 2021-07-16 RX ADMIN — OXYCODONE HYDROCHLORIDE 15 MG: 10 TABLET ORAL at 00:09

## 2021-07-16 RX ADMIN — LIDOCAINE HYDROCHLORIDE 10 ML: 10 INJECTION, SOLUTION EPIDURAL; INFILTRATION; INTRACAUDAL; PERINEURAL at 12:49

## 2021-07-16 RX ADMIN — CLONIDINE HYDROCHLORIDE 0.1 MG: 0.1 TABLET ORAL at 21:31

## 2021-07-16 RX ADMIN — OXYCODONE HYDROCHLORIDE 15 MG: 10 TABLET ORAL at 22:19

## 2021-07-16 RX ADMIN — HYDROMORPHONE HYDROCHLORIDE 0.5 MG: 1 INJECTION, SOLUTION INTRAMUSCULAR; INTRAVENOUS; SUBCUTANEOUS at 03:27

## 2021-07-16 RX ADMIN — METHOCARBAMOL TABLETS 750 MG: 750 TABLET, COATED ORAL at 01:38

## 2021-07-16 RX ADMIN — HYDROMORPHONE HYDROCHLORIDE 0.5 MG: 1 INJECTION, SOLUTION INTRAMUSCULAR; INTRAVENOUS; SUBCUTANEOUS at 21:27

## 2021-07-16 RX ADMIN — ACETAMINOPHEN 975 MG: 325 TABLET, FILM COATED ORAL at 21:29

## 2021-07-16 RX ADMIN — VANCOMYCIN HYDROCHLORIDE 1250 MG: 10 INJECTION, POWDER, LYOPHILIZED, FOR SOLUTION INTRAVENOUS at 21:31

## 2021-07-16 RX ADMIN — GABAPENTIN 200 MG: 100 CAPSULE ORAL at 08:33

## 2021-07-16 RX ADMIN — APIXABAN 5 MG: 5 TABLET, FILM COATED ORAL at 17:42

## 2021-07-16 RX ADMIN — VANCOMYCIN HYDROCHLORIDE 1250 MG: 10 INJECTION, POWDER, LYOPHILIZED, FOR SOLUTION INTRAVENOUS at 06:32

## 2021-07-16 RX ADMIN — OXYCODONE HYDROCHLORIDE 15 MG: 10 TABLET ORAL at 08:34

## 2021-07-16 RX ADMIN — ARIPIPRAZOLE 10 MG: 10 TABLET ORAL at 08:36

## 2021-07-16 RX ADMIN — APIXABAN 5 MG: 5 TABLET, FILM COATED ORAL at 08:35

## 2021-07-16 RX ADMIN — CLONAZEPAM 0.5 MG: 0.5 TABLET ORAL at 08:34

## 2021-07-16 RX ADMIN — ACETAMINOPHEN 975 MG: 325 TABLET, FILM COATED ORAL at 01:38

## 2021-07-16 RX ADMIN — VANCOMYCIN HYDROCHLORIDE 1250 MG: 10 INJECTION, POWDER, LYOPHILIZED, FOR SOLUTION INTRAVENOUS at 15:00

## 2021-07-16 RX ADMIN — ACETAMINOPHEN 975 MG: 325 TABLET, FILM COATED ORAL at 08:33

## 2021-07-16 RX ADMIN — HYDROMORPHONE HYDROCHLORIDE 0.5 MG: 1 INJECTION, SOLUTION INTRAMUSCULAR; INTRAVENOUS; SUBCUTANEOUS at 09:39

## 2021-07-16 RX ADMIN — CLONAZEPAM 0.5 MG: 0.5 TABLET ORAL at 17:41

## 2021-07-16 RX ADMIN — ACETAMINOPHEN 975 MG: 325 TABLET, FILM COATED ORAL at 14:57

## 2021-07-16 RX ADMIN — HYDROMORPHONE HYDROCHLORIDE 0.5 MG: 1 INJECTION, SOLUTION INTRAMUSCULAR; INTRAVENOUS; SUBCUTANEOUS at 15:16

## 2021-07-16 RX ADMIN — POLYETHYLENE GLYCOL 3350 17 G: 17 POWDER, FOR SOLUTION ORAL at 08:39

## 2021-07-16 RX ADMIN — OXYCODONE HYDROCHLORIDE 15 MG: 10 TABLET ORAL at 12:51

## 2021-07-16 NOTE — PROGRESS NOTES
[address] [facility]  Progress Note - Dharmesh Dallas 1990, 31 y o  female MRN: [id number]  Unit/Bed#: [facility] 920-01 Encounter: [id number]  Primary Care Provider: No primary care provider on file  Date and time admitted to hospital: 7/11/2021  3:46 PM    * Low back pain  Assessment & Plan  · Patient presents to the ED with severe low back pain  Found to have b/l lower extremity cellulitis in the setting of IVDU and gram + bacteremia  · CT abdomen and pelvis negative for acute abdominal pathology  · Given ongoing intravenous drug use and bacteremia, diskitis/osteomyelitis/abscess needs to be considered  Refused MRI as she will not remove her nipple piercings and also patient does have retained needle in her neck from prior admission  CT recon  lumbar spine negative  · APS following, continue ATC APAP, lidocaine patch, gabapentin, Robaxin, p r n  Oxycodone 10/15 and intravenous Dilaudid for breakthrough  completed IV Toradol times 48 hours  Septic embolism (HCC)  Assessment & Plan  · Small scattered peripheral ground-glass opacities within the bilateral lung bases possibly due to pneumonia versus septic emboli in the setting of intravenous drug use  Id following, also need to consider aspiration from loss of consciousness from intravenous drug intoxication  · COVID-19 PCR negative  · No respiratory complaints and oxygen saturation adequate on room air  Cellulitis of lower extremity  Assessment & Plan  · Bilateral great toe erythema/swelling and significant tenderness  Now with right ankle abscess see below  · X-rays negative  · Intravenous antibiotics with vancomycin  · ID following  · Serial exams    Abscess  Assessment & Plan  · Right lateral ankle and left forearm  · General surgery consult    MRSA bacteremia  Assessment & Plan  · 1 of 1 blood culture from admission growing MRSA  Repeat blood cultures negative at 48 hours    Will plan for PICC line today if repeat blood cultures negative at 72 hours  · Continue vancomycin    · Echo pending  · Will need 6 weeks IV abx from negative blood culture    Heroin abuse ([facility] [address] )  Assessment & Plan  · Patient admits to recent intravenous cocaine and fentanyl use  · Monitor closely for withdrawal   Continue clonidine 0 2 b i d  · CM following, refused HOST  Chronic anticoagulation  Assessment & Plan  · Continue Eliquis 5 mg twice daily  Bipolar 1 disorder (HCC)  Assessment & Plan  · Continue Abilify 10 mg daily  · Klonopin 0 5 mg twice daily  · Prazosin 1 mg q h s     History of endocarditis  Assessment & Plan  · 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  Patient completed six week course of antibiotics at this time  · Now with positive blood cultures again  Echo pending  No need for MELISSA as would not   VTE Pharmacologic Prophylaxis:   Moderate Risk (Score 3-4) - Pharmacological DVT Prophylaxis Ordered: apixaban (Eliquis)  Patient Centered Rounds: I performed bedside rounds with nursing staff today  Discussions with Specialists or Other Care Team Provider: nursing, case management, ID     Education and Discussions with Family / Patient: Patient declined call to   Time Spent for Care: 30 minutes  More than 50% of total time spent on counseling and coordination of care as described above  Current Length of Stay: 4 day(s)  Current Patient Status: Inpatient   Certification Statement: The patient will continue to require additional inpatient hospital stay due to Intravenous antibiotics  Discharge Plan: Anticipate discharge in >72 hrs to home  Code Status: Level 1 - Full Code    Subjective:   Patient continues to complain of severe low back pain  Now with new abscess right lateral ankle and worsening abscess left forearm  Denies any abdominal pain today but does have intermittent cramping    Feels as though it could be more related to back pain however with recent , need to monitor  Agreeable to PICC line  Objective:     Vitals:   Temp (24hrs), Av 2 °F (36 8 °C), Min:98 1 °F (36 7 °C), Max:98 2 °F (36 8 °C)    Temp:  [98 1 °F (36 7 °C)-98 2 °F (36 8 °C)] 98 1 °F (36 7 °C)  Resp:  [18] 18  BP: ()/(43-70) 97/48  Body mass index is 23 51 kg/m²  Input and Output Summary (last 24 hours): Intake/Output Summary (Last 24 hours) at 2021 0924  Last data filed at 7/15/2021 1531  Gross per 24 hour   Intake 240 ml   Output --   Net 240 ml       Physical Exam:   Physical Exam  Vitals and nursing note reviewed  Cardiovascular:      Rate and Rhythm: Normal rate  Heart sounds: No murmur heard  Pulmonary:      Breath sounds: Normal breath sounds  Abdominal:      Tenderness: There is no abdominal tenderness  Musculoskeletal:         General: Swelling (Bilateral great toe, erythema improving) and tenderness (Low back) present  Skin:     General: Skin is warm  Comments: Right lateral ankle abscess, left forearm abscess  Generalized needle sticks, scabs   Neurological:      Mental Status: She is alert and oriented to person, place, and time  Mental status is at baseline     Psychiatric:         Mood and Affect: Mood normal           Additional Data:     Labs:  Results from last 7 days   Lab Units 21  0454 21  0621   WBC Thousand/uL 8 46 10 34*   HEMOGLOBIN g/dL 9 4* 10 1*   HEMATOCRIT % 28 4* 30 8*   PLATELETS Thousands/uL 265 247   BANDS PCT % 6  --    NEUTROS PCT %  --  80*   LYMPHS PCT %  --  6*   LYMPHO PCT % 14  --    MONOS PCT %  --  13*   MONO PCT % 2*  --    EOS PCT % 0 0     Results from last 7 days   Lab Units 21  0454 21  0621   SODIUM mmol/L 137 140   POTASSIUM mmol/L 3 1* 3 2*   CHLORIDE mmol/L 106 107   CO2 mmol/L 23 26   BUN mg/dL 8 4*   CREATININE mg/dL 0 78 0 41*   ANION GAP mmol/L 8 7   CALCIUM mg/dL 7 5* 7 7*   ALBUMIN g/dL  --  2 2*   TOTAL BILIRUBIN mg/dL  --  0 44   ALK PHOS U/L  --  114   ALT U/L  --  32   AST U/L  --  14   GLUCOSE RANDOM mg/dL 152* 95     Results from last 7 days   Lab Units 07/12/21  0430   INR  1 17             Results from last 7 days   Lab Units 07/13/21  0621 07/12/21  0430 07/11/21  1805   LACTIC ACID mmol/L  --   --  1 2   PROCALCITONIN ng/ml 7 79* 0 77*  --        Lines/Drains:  Invasive Devices     Peripheral Intravenous Line            Peripheral IV 07/11/21 Right;Upper;Medial Arm 4 days                      Imaging: No pertinent imaging reviewed  Recent Cultures (last 7 days):   Results from last 7 days   Lab Units 07/13/21  0623 07/13/21  0622 07/12/21  0210 07/11/21  1805   BLOOD CULTURE  No Growth at 48 hrs  No Growth at 48 hrs    --  Methicillin Resistant Staphylococcus aureus*   GRAM STAIN RESULT   --   --   --  Gram positive cocci in clusters*   URINE CULTURE   --   --  40,000-49,000 cfu/ml   --        Last 24 Hours Medication List:   Current Facility-Administered Medications   Medication Dose Route Frequency Provider Last Rate    acetaminophen  975 mg Oral Q8H [address] BOO Kay      aluminum-magnesium hydroxide-simethicone  30 mL Oral Q6H PRN Derrick Benites MD      apixaban  5 mg Oral BID Derrick Benites MD      ARIPiprazole  10 mg Oral Daily Derrick Benites MD      clonazePAM  0 5 mg Oral BID BOO Kay      cloNIDine  0 1 mg Oral Q12H [address] BOO Kay      docusate sodium  100 mg Oral BID PRN Derrick Benites MD      gabapentin  200 mg Oral BID BOO Kay      HYDROmorphone  0 5 mg Intravenous Q6H PRN BOO Kay      methocarbamol  750 mg Oral Q6H PRN Jessica Williamson PA-C      nicotine  1 patch Transdermal Daily Derrick Benites MD      ondansetron  4 mg Intravenous Q6H PRN Derrick Benites MD      oxyCODONE  10 mg Oral Q4H PRN BOO Jimenez      oxyCODONE  15 mg Oral Q4H PRN BOO Kay      polyethylene glycol  17 g Oral Daily BOO Jimenez      prazosin  1 mg Oral HS Derrick Benites MD      vancomycin  1,250 mg Intravenous Q8H Raegan Carlos MD 1,250 mg (07/16/21 9851)        Today, Patient Was Seen By: BOO Grossman    **Please Note: This note may have been constructed using a voice recognition system  **

## 2021-07-16 NOTE — PLAN OF CARE
Problem: MOBILITY - ADULT  Goal: Maintain or return to baseline ADL function  Description: INTERVENTIONS:  -  Assess patient's ability to carry out ADLs; assess patient's baseline for ADL function and identify physical deficits which impact ability to perform ADLs (bathing, care of mouth/teeth, toileting, grooming, dressing, etc )  - Assess/evaluate cause of self-care deficits   - Assess range of motion  - Assess patient's mobility; develop plan if impaired  - Assess patient's need for assistive devices and provide as appropriate  - Encourage maximum independence but intervene and supervise when necessary  - Involve family in performance of ADLs  - Assess for home care needs following discharge   - Consider OT consult to assist with ADL evaluation and planning for discharge  - Provide patient education as appropriate  Outcome: Progressing  Goal: Maintains/Returns to pre admission functional level  Description: INTERVENTIONS:  - Perform BMAT or MOVE assessment daily    - Set and communicate daily mobility goal to care team and patient/family/caregiver     - Collaborate with rehabilitation services on mobility goals if consulted  - Perform Range of Motion   - Reposition patient every   - Dangle patient   - Stand patient   - Ambulate patient   - Out of bed to chair   - Out of bed for meals   - Out of bed for toileting  - Record patient progress and toleration of activity level   Outcome: Progressing     Problem: PAIN - ADULT  Goal: Verbalizes/displays adequate comfort level or baseline comfort level  Description: Interventions:  - Encourage patient to monitor pain and request assistance  - Assess pain using appropriate pain scale  - Administer analgesics based on type and severity of pain and evaluate response  - Implement non-pharmacological measures as appropriate and evaluate response  - Consider cultural and social influences on pain and pain management  - Notify physician/advanced practitioner if interventions unsuccessful or patient reports new pain  Outcome: Progressing     Problem: INFECTION - ADULT  Goal: Absence or prevention of progression during hospitalization  Description: INTERVENTIONS:  - Assess and monitor for signs and symptoms of infection  - Monitor lab/diagnostic results  - Monitor all insertion sites, i e  indwelling lines, tubes, and drains  - Monitor endotracheal if appropriate and nasal secretions for changes in amount and color  - [place] appropriate cooling/warming therapies per order  - Administer medications as ordered  - Instruct and encourage patient and family to use good hand hygiene technique  - Identify and instruct in appropriate isolation precautions for identified infection/condition  Outcome: Progressing  Goal: Absence of fever/infection during neutropenic period  Description: INTERVENTIONS:  - Monitor WBC    Outcome: Progressing     Problem: SAFETY ADULT  Goal: Maintain or return to baseline ADL function  Description: INTERVENTIONS:  -  Assess patient's ability to carry out ADLs; assess patient's baseline for ADL function and identify physical deficits which impact ability to perform ADLs (bathing, care of mouth/teeth, toileting, grooming, dressing, etc )  - Assess/evaluate cause of self-care deficits   - Assess range of motion  - Assess patient's mobility; develop plan if impaired  - Assess patient's need for assistive devices and provide as appropriate  - Encourage maximum independence but intervene and supervise when necessary  - Involve family in performance of ADLs  - Assess for home care needs following discharge   - Consider OT consult to assist with ADL evaluation and planning for discharge  - Provide patient education as appropriate  Outcome: Progressing  Goal: Maintains/Returns to pre admission functional level  Description: INTERVENTIONS:  - Perform BMAT or MOVE assessment daily    - Set and communicate daily mobility goal to care team and patient/family/caregiver     - Collaborate with rehabilitation services on mobility goals if consulted  - Perform Range of Motion  - Reposition patient every   - Dangle patient   - Stand patient   - Ambulate patient   - Out of bed to chair   - Out of bed for meals   - Out of bed for toileting  - Record patient progress and toleration of activity level   Outcome: Progressing  Goal: Patient will remain free of falls  Description: INTERVENTIONS:  - Educate patient/family on patient safety including physical limitations  - Instruct patient to call for assistance with activity   - Consult OT/PT to assist with strengthening/mobility   - Keep Call bell within reach  - Keep bed low and locked with side rails adjusted as appropriate  - Keep care items and personal belongings within reach  - Initiate and maintain comfort rounds  - Make Fall Risk Sign visible to staff  - Offer Toileting every , in advance of need  - Initiate/Maintain   - Obtain necessary fall risk management equipment:   - Apply yellow socks and bracelet for high fall risk patients  - Consider moving patient to room near nurses station  Outcome: Progressing     Problem: DISCHARGE PLANNING  Goal: Discharge to home or other facility with appropriate resources  Description: INTERVENTIONS:  - Identify barriers to discharge w/patient and caregiver  - Arrange for needed discharge resources and transportation as appropriate  - Identify discharge learning needs (meds, wound care, etc )  - Arrange for interpretive services to assist at discharge as needed  - Refer to Case Management Department for coordinating discharge planning if the patient needs post-hospital services based on physician/advanced practitioner order or complex needs related to functional status, cognitive ability, or social support system  Outcome: Progressing     Problem: Knowledge Deficit  Goal: Patient/family/caregiver demonstrates understanding of disease process, treatment plan, medications, and discharge instructions  Description: Complete learning assessment and assess knowledge base  Interventions:  - Provide teaching at level of understanding  - Provide teaching via preferred learning methods  Outcome: Progressing     Problem: Prexisting or High Potential for Compromised Skin Integrity  Goal: Skin integrity is maintained or improved  Description: INTERVENTIONS:  - Identify patients at risk for skin breakdown  - Assess and monitor skin integrity  - Assess and monitor nutrition and hydration status  - Monitor labs   - Assess for incontinence   - Turn and reposition patient  - Assist with mobility/ambulation  - Relieve pressure over bony prominences  - Avoid friction and shearing  - Provide appropriate hygiene as needed including keeping skin clean and dry  - Evaluate need for skin moisturizer/barrier cream  - Collaborate with interdisciplinary team   - Patient/family teaching  - Consider wound care consult   Outcome: Progressing     Problem: Nutrition/Hydration-ADULT  Goal: Nutrient/Hydration intake appropriate for improving, restoring or maintaining nutritional needs  Description: Monitor and assess patient's nutrition/hydration status for malnutrition  Collaborate with interdisciplinary team and initiate plan and interventions as ordered  Monitor patient's weight and dietary intake as ordered or per policy  Utilize nutrition screening tool and intervene as necessary  Determine patient's food preferences and provide high-protein, high-caloric foods as appropriate       INTERVENTIONS:  - Monitor oral intake, urinary output, labs, and treatment plans  - Assess nutrition and hydration status and recommend course of action  - Evaluate amount of meals eaten  - Assist patient with eating if necessary   - Allow adequate time for meals  - Recommend/ encourage appropriate diets, oral nutritional supplements, and vitamin/mineral supplements  - Order, calculate, and assess calorie counts as needed  - Recommend, monitor, and adjust tube feedings and TPN/PPN based on assessed needs  - Assess need for intravenous fluids  - Provide specific nutrition/hydration education as appropriate  - Include patient/family/caregiver in decisions related to nutrition  Outcome: Progressing     Problem: Potential for Falls  Goal: Patient will remain free of falls  Description: INTERVENTIONS:  - Educate patient/family on patient safety including physical limitations  - Instruct patient to call for assistance with activity   - Consult OT/PT to assist with strengthening/mobility   - Keep Call bell within reach  - Keep bed low and locked with side rails adjusted as appropriate  - Keep care items and personal belongings within reach  - Initiate and maintain comfort rounds  - Make Fall Risk Sign visible to staff  - Offer Toileting every in advance of need  - Initiate/Maintain   - Obtain necessary fall risk management equipment:   - Apply yellow socks and bracelet for high fall risk patients  - Consider moving patient to room near nurses station  Outcome: Progressing

## 2021-07-16 NOTE — PROGRESS NOTES
Progress Note - Acute Pain Service    Mena Pineda 31 y o  female MRN: [id number]  Unit/Bed#: [facility] 920-01 Encounter: [id number]      Assessment:   Principal Problem:    Low back pain  Active Problems:    Septic embolism (HCC)    Bipolar 1 disorder (HCC)    Chronic anticoagulation    Heroin abuse ([facility] [address] )    Cellulitis of lower extremity    MRSA bacteremia    History of endocarditis    Abscess    Mena Pineda is a 31 y o  female  With several past admissions for bacteremia, endocarditis and septic emboli  Due to ongoing IV drug use   Admitted   7/12/21 for likely septic pulmonary emboli, possible bacteremia and new onset lower back pain  Plan:    Continue Tylenol 975 mg p o  q 8 hours scheduled   Continue oxycodone 10 mg p o  q 4 hours p r n  moderate pain   Continue oxycodone 15 mg p o  q 4 hours p r n  severe pain   Continue Dilaudid 0 5 mg IV q 6 hours p r n  breakthrough pain     Suggest increase Neurontin to 300 mg p o  b i d  scheduled   Continue Robaxin 750 mg p o  q 6 hours p r n  muscle spasms   Continue bowel regimen to avoid opioid induced constipation   Ice to affected area for up to 20 minutes every hour   Do not escalate opioid regimen over weekend  APS will continue to follow  Please contact Acute Pain Service - [facility] via [employer] from [phone] with additional questions or concerns  See [place] or Darvin for additional contacts and after hours information  Pain History  Current pain location(s):   Lower back  Pain Scale:    10/10  Quality:  Sharp, excruciating  24 hour history:  Patient continues to complain of severe lower back pain, however continues to appear comfortable and mobile  Opioid requirement previous 24 hours:   Oxycodone 75 mg p o , Dilaudid 2 mg IV      Meds/Allergies   all current active meds have been reviewed, current meds:   Current Facility-Administered Medications   Medication Dose Route Frequency    acetaminophen (TYLENOL) tablet 975 mg  975 mg Oral Q8H [address]    aluminum-magnesium hydroxide-simethicone (MYLANTA) oral suspension 30 mL  30 mL Oral Q6H PRN    apixaban (ELIQUIS) tablet 5 mg  5 mg Oral BID    ARIPiprazole (ABILIFY) tablet 10 mg  10 mg Oral Daily    clonazePAM (KlonoPIN) tablet 0 5 mg  0 5 mg Oral BID    cloNIDine (CATAPRES) tablet 0 1 mg  0 1 mg Oral Q12H [address]    docusate sodium (COLACE) capsule 100 mg  100 mg Oral BID PRN    gabapentin (NEURONTIN) capsule 200 mg  200 mg Oral BID    HYDROmorphone (DILAUDID) injection 0 5 mg  0 5 mg Intravenous Q6H PRN    methocarbamol (ROBAXIN) tablet 750 mg  750 mg Oral Q6H PRN    nicotine (NICODERM CQ) 21 mg/24 hr TD 24 hr patch 1 patch  1 patch Transdermal Daily    ondansetron (ZOFRAN) injection 4 mg  4 mg Intravenous Q6H PRN    oxyCODONE (ROXICODONE) immediate release tablet 10 mg  10 mg Oral Q4H PRN    oxyCODONE (ROXICODONE) IR tablet 15 mg  15 mg Oral Q4H PRN    polyethylene glycol (MIRALAX) packet 17 g  17 g Oral Daily    prazosin (MINIPRESS) capsule 1 mg  1 mg Oral HS    vancomycin (VANCOCIN) 1,250 mg in sodium chloride 0 9 % 250 mL IVPB  1,250 mg Intravenous Q8H    and PTA meds:   Prior to Admission Medications   Prescriptions Last Dose Informant Patient Reported? Taking? ARIPiprazole (ABILIFY) 10 mg tablet Unknown at Unknown time  Yes No   Sig: Take 10 mg by mouth daily Dosage unknown   apixaban (ELIQUIS) 5 mg   No No   Sig: Take 2 tablets (10 mg total) by mouth 2 (two) times a day for 7 days, THEN 1 tablet (5 mg total) 2 (two) times a day for 23 days     clonazePAM (KlonoPIN) 0 5 mg tablet   No No   Sig: Take 1 tablet (0 5 mg total) by mouth 2 (two) times a day for 3 days   gabapentin (NEURONTIN) 300 mg capsule   No No   Sig: Take 1 capsule (300 mg total) by mouth 2 (two) times a day for 7 days   prazosin (MINIPRESS) 1 mg capsule   No No   Sig: Take 1 capsule (1 mg total) by mouth daily at bedtime      Facility-Administered Medications: None       Allergies Allergen Reactions    Cat Hair Extract Itching    Dog Epithelium     Latex     Pollen Extract        Objective     Temp:  [98 1 °F (36 7 °C)-98 2 °F (36 8 °C)] 98 1 °F (36 7 °C)  Resp:  [18] 18  BP: ()/(43-70) 97/48    Physical Exam  Vitals and nursing note reviewed  Constitutional:       General: She is sleeping  She is not in acute distress  Appearance: She is not ill-appearing, toxic-appearing or diaphoretic  Skin:     General: Skin is warm and dry  Neurological:      Mental Status: She is oriented to person, place, and time and easily aroused  GCS: GCS eye subscore is 3  GCS verbal subscore is 5  GCS motor subscore is 6  Psychiatric:         Behavior: Behavior is cooperative  Lab Results:   Results from last 7 days   Lab Units 07/14/21  0454   WBC Thousand/uL 8 46   HEMOGLOBIN g/dL 9 4*   HEMATOCRIT % 28 4*   PLATELETS Thousands/uL 265      Results from last 7 days   Lab Units 07/14/21  0454 07/13/21  0621   POTASSIUM mmol/L 3 1* 3 2*   CHLORIDE mmol/L 106 107   CO2 mmol/L 23 26   BUN mg/dL 8 4*   CREATININE mg/dL 0 78 0 41*   CALCIUM mg/dL 7 5* 7 7*   ALK PHOS U/L  --  114   ALT U/L  --  32   AST U/L  --  14       Imaging Studies: I have personally reviewed pertinent reports  EKG, Pathology, and Other Studies: I have personally reviewed pertinent reports  Counseling / Coordination of Care  Total floor / unit time spent today 30 minutes  Greater than 50% of total time was spent with the patient and / or family counseling and / or coordination of care  A description of the counseling / coordination of care:  Patient interview, physical examination, review of medical record, review of imaging and laboratory data, development of pain management plan, discussion of pain management plan with patient and primary service  Please note that the APS provides consultative services regarding pain management only    With the exception of ketamine and epidural infusions and except when indicated, final decisions regarding starting or changing doses of analgesic medications are at the discretion of the consulting service  Off hours consultation and/or medication management is generally not available      Debo Nicole PA-C  Acute Pain Service

## 2021-07-16 NOTE — ASSESSMENT & PLAN NOTE
· Patient presents to the ED with severe low back pain  Found to have b/l lower extremity cellulitis in the setting of IVDU and gram + bacteremia  · CT abdomen and pelvis negative for acute abdominal pathology  · Given ongoing intravenous drug use and bacteremia, diskitis/osteomyelitis/abscess needs to be considered  Refused MRI as she will not remove her nipple piercings and also patient does have retained needle in her neck from prior admission  CT recon  lumbar spine negative  · APS following, continue ATC APAP, lidocaine patch, gabapentin, Robaxin, p r n  Oxycodone 10/15 and intravenous Dilaudid for breakthrough  completed IV Toradol times 48 hours

## 2021-07-16 NOTE — PROGRESS NOTES
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, she must be treated for suspected infective endocarditis  Repeat blood cultures show no growth at 72 hours  Continue IV vancomycin for 6 week course  2  Abscesses, acute  Patient has a carbuncle on the right ankle and left forearm, likely secondary to IVDU  She is currently on IV vancomycin for her suspected endocarditis  Acute care surgical team has been consulted for bedside incision and drainage today  3  Bilateral feet cellulitis  This is likely secondary to IVDU  Clinically, she is improving  Edema in the right foot has improved  Edema still present on the left foot with erythema on the first metatarsal  No signs of septic arthritis  4  Low back pain  Physical exam is benign  Continue to monitor for worsening symptoms  5  Abnormal abdominal CT  CT showed small foci of ground-glass opacities  This is likely secondary to aspiration from IVDU  Patient has no respiratory difficulties or complaints  Continue to monitor for worsening symptoms  Antibiotics:  Vancomycin    Subjective:  Patient is more alert and responsive this morning  She continues to complain of throbbing lower back pain and pain in bilateral ankles  She has no fever, chills, sweats; no nausea, vomiting, diarrhea; no cough, shortness of breath       Objective:  Vitals:  Temp:  [98 1 °F (36 7 °C)-98 2 °F (36 8 °C)] 98 1 °F (36 7 °C)  Resp:  [18] 18  BP: ()/(43-70) 97/48  Temp (24hrs), Av 2 °F (36 8 °C), Min:98 1 °F (36 7 °C), Max:98 2 °F (36 8 °C)  Current: Temperature: 98 1 °F (36 7 °C)    Physical Exam:   General Appearance: Alert, interactive, nontoxic, no acute distress  Throat: Oropharynx moist without lesions  Lungs:   Clear to auscultation bilaterally; no wheezes, rhonchi or rales; respirations unlabored   Heart:  RRR; stable murmur in LUSB  Abdomen:   Soft, non-tender, non-distended, positive bowel sounds  Extremities: Persistent left foot edema and erythema along first metatarsal  New, erythematous carbuncle along lateral malleolus on right  Carbuncle also seen on left forearm without signs of drainage and mild erythema  Labs, Imaging, & Other studies:   All pertinent labs and imaging studies were personally reviewed  Results from last 7 days   Lab Units 07/14/21  0454 07/13/21  0621 07/11/21  1805   WBC Thousand/uL 8 46 10 34* 13 07*   HEMOGLOBIN g/dL 9 4* 10 1* 11 1*   PLATELETS Thousands/uL 265 247 299     Results from last 7 days   Lab Units 07/14/21  0454 07/13/21  0621 07/11/21  1805   SODIUM mmol/L 137 140 134*   POTASSIUM mmol/L 3 1* 3 2* 3 0*   CHLORIDE mmol/L 106 107 100   CO2 mmol/L 23 26 27   BUN mg/dL 8 4* 10   CREATININE mg/dL 0 78 0 41* 0 52*   EGFR ml/min/1 73sq m 104 141 130   CALCIUM mg/dL 7 5* 7 7* 8 5   AST U/L  --  14 42   ALT U/L  --  32 56   ALK PHOS U/L  --  114 110     Results from last 7 days   Lab Units 07/13/21  0623 07/13/21  0622 07/12/21  0210 07/11/21  1805   BLOOD CULTURE  No Growth at 72 hrs  No Growth at 72 hrs    --  Methicillin Resistant Staphylococcus aureus*   GRAM STAIN RESULT   --   --   --  Gram positive cocci in clusters*   URINE CULTURE   --   --  40,000-49,000 cfu/ml   --      Results from last 7 days   Lab Units 07/13/21  0621 07/12/21  0430   PROCALCITONIN ng/ml 7 79* 0 77*

## 2021-07-16 NOTE — PLAN OF CARE
Problem: INFECTION - ADULT  Goal: Absence or prevention of progression during hospitalization  Description: INTERVENTIONS:  - Assess and monitor for signs and symptoms of infection  - Monitor lab/diagnostic results  - Monitor all insertion sites, i e  indwelling lines, tubes, and drains  - Monitor endotracheal if appropriate and nasal secretions for changes in amount and color  - [place] appropriate cooling/warming therapies per order  - Administer medications as ordered  - Instruct and encourage patient and family to use good hand hygiene technique  - Identify and instruct in appropriate isolation precautions for identified infection/condition  Outcome: Progressing

## 2021-07-16 NOTE — ASSESSMENT & PLAN NOTE
· Bilateral great toe erythema/swelling and significant tenderness  Now with right ankle abscess see below     · X-rays negative  · Intravenous antibiotics with vancomycin  · ID following  · Serial exams

## 2021-07-16 NOTE — PROGRESS NOTES
Vancomycin IV Pharmacy-to-Dose Consultation    Giovany Lara is a 31 y o  female who is currently receiving Vancomycin IV with management by the Pharmacy Consult service  Assessment/Plan:  The patient was reviewed  Renal function is stable and no signs or symptoms of nephrotoxicity and/or infusion reactions were documented in the chart  Based on todays assessment, continue current vancomycin (day #4) dosing of 1250mg Q8H , with a plan for trough to be drawn at 0530 on 7/17/21  Will repeat trough tomorrow as patient missed a dose yesterday 7/15/21 at 1400  Today's vanco trough was 14 9 ( trough was not at steady state due to missed dose)  We will continue to follow the patients culture results and clinical progress daily      Lakesha Martinez, Pharmacist

## 2021-07-16 NOTE — ASSESSMENT & PLAN NOTE
· 1 of 1 blood culture from admission growing MRSA  Repeat blood cultures negative at 48 hours  Will plan for PICC line today if repeat blood cultures negative at 72 hours    · Continue vancomycin    · Echo pending  · Will need 6 weeks IV abx from negative blood culture

## 2021-07-16 NOTE — OCCUPATIONAL THERAPY NOTE
Occupational Therapy Screen     07/16/21 1400   OT Last Visit   OT Visit Date 07/16/21   Note Type   Note type Screen     OT orders received, pt's chart reviewed  Pt with multiple hospitalization, each with multiple refusals to participate in therapy services  Spoke with pt, who reports that she has been ambulating and completing self care I'ly during this admission, and would prefer not to be seen by OT or PT  Pt reports that if therapy services are discharged, she will be agreeable to only laying in bed for sleeping at night, and will walk around the unit at least 4x/day  Educated pt on the importance of following through with these activities to prevent further complications  Pt acknowledges understanding  OT orders to be d/c  Anticipate pt d/c to drug and alcohol rehab  If patient demonstrates a significant functional decline, please re-consult OT      Charley Holstein, MOT, OTR/L, CSRS

## 2021-07-16 NOTE — ASSESSMENT & PLAN NOTE
· 1 of 1 blood culture from admission growing MRSA  Repeat blood cultures negative at 48 hours  · Continue vancomycin    · Echo pending  · Will need 6 weeks IV abx from negative blood culture  PICC placed 7/16

## 2021-07-16 NOTE — CONSULTS
Consultation - General Surgery   Jamal Santacruz 31 y o  female MRN: [id number]  Unit/Bed#: [facility] 920-01 Encounter: [id number]    Assessment/Plan     Assessment:  Jamal Santacruz is a 31 y o  female w/ hx IVDU c/b TV endocarditis s/p repair (on Eliquis) - now with back pain, MRSA bacteremia and cellulitis  Surgery consulted for left forearm and right ankle abscesses  Plan:  · Will perform bedside I&D later today  · Continue abx, per primary  · Care per primary team    History of Present Illness     HPI:  Jamal Santacruz is a 31 y o  female who presents with back pain  Patient has history of IV drug abuse complicated by tricuspid valve endocarditis status post repair (on Eliquis)  Now presenting with acute onset lower back pain  Initial CT imaging was negative for acute pathology, but patient refusing MRI  She was also found to have MRSA bacteremia, possible septic emboli to the bilateral lungs as well as left forearm and right ankle abscesses for which we were consulted to evaluate for I and D  Patient states that she has history of hepatitis C which has not been treated  She states that she normally uses clean needles however she was using needles off the floor of her car prior to her most recent presentation  Patient states that she believes she missed the vein  Inpatient consult to Acute Care Surgery     Performed by  Carlos Eduardo Orozco MD     Authorized by BOO Tong              Review of Systems   Constitutional: Negative for chills and fever  HENT: Negative  Eyes: Negative  Respiratory: Negative for shortness of breath  Cardiovascular: Negative for chest pain  Gastrointestinal: Negative for abdominal pain, nausea and vomiting  Endocrine: Negative  Genitourinary: Negative  Musculoskeletal: Positive for back pain  Skin: Positive for color change  Allergic/Immunologic: Negative  Hematological: Negative  Psychiatric/Behavioral: Negative  Historical Information   Past Medical History:   Diagnosis Date    Abnormal Pap smear of cervix     Anxiety     Depression     Endocarditis     2018    Hepatitis C     HPV (human papilloma virus) anogenital infection      Past Surgical History:   Procedure Laterality Date    IR PICC PLACEMENT DOUBLE LUMEN  2020    KNEE SURGERY Left     MOUTH SURGERY      KS REPLACE TRICUSPID W CP BYPASS N/A 9/10/2020    Procedure: REPAIR VALVE TRICUSPID with [employer]onic 30mm 3D Contour  Annuloplasty Ring;  Surgeon: Kvng Mendoza MD;  [facility]: [facility];  Service: Cardiac Surgery    TOOTH EXTRACTION N/A 2020    Procedure: EXTRACTION TOOTH 32;  Surgeon: Stephania Ortiz DMD;  Location: [facility];  Service: Maxillofacial     Social History   Social History     Substance and Sexual Activity   Alcohol Use Not Currently    Alcohol/week: 0 0 standard drinks     Social History     Substance and Sexual Activity   Drug Use Yes    Types: Cocaine, Heroin, Fentanyl    Comment: injected cocaine last night  E-Cigarette/Vaping    E-Cigarette Use Current Every Day User      E-Cigarette/Vaping Substances    Nicotine Yes      Social History     Tobacco Use   Smoking Status Current Every Day Smoker    Packs/day: 0 50    Types: Cigarettes    Last attempt to quit: 2016    Years since quittin 6   Smokeless Tobacco Never Used     Family History: History reviewed  No pertinent family history      Meds/Allergies   current meds:   Current Facility-Administered Medications   Medication Dose Route Frequency    acetaminophen (TYLENOL) tablet 975 mg  975 mg Oral Q8H [address]    aluminum-magnesium hydroxide-simethicone (MYLANTA) oral suspension 30 mL  30 mL Oral Q6H PRN    apixaban (ELIQUIS) tablet 5 mg  5 mg Oral BID    ARIPiprazole (ABILIFY) tablet 10 mg  10 mg Oral Daily    clonazePAM (KlonoPIN) tablet 0 5 mg  0 5 mg Oral BID    cloNIDine (CATAPRES) tablet 0 1 mg  0 1 mg Oral Q12H [address]    docusate sodium (COLACE) capsule 100 mg  100 mg Oral BID PRN    gabapentin (NEURONTIN) capsule 200 mg  200 mg Oral BID    HYDROmorphone (DILAUDID) injection 0 5 mg  0 5 mg Intravenous Q6H PRN    methocarbamol (ROBAXIN) tablet 750 mg  750 mg Oral Q6H PRN    nicotine (NICODERM CQ) 21 mg/24 hr TD 24 hr patch 1 patch  1 patch Transdermal Daily    ondansetron (ZOFRAN) injection 4 mg  4 mg Intravenous Q6H PRN    oxyCODONE (ROXICODONE) immediate release tablet 10 mg  10 mg Oral Q4H PRN    oxyCODONE (ROXICODONE) IR tablet 15 mg  15 mg Oral Q4H PRN    polyethylene glycol (MIRALAX) packet 17 g  17 g Oral Daily    prazosin (MINIPRESS) capsule 1 mg  1 mg Oral HS    vancomycin (VANCOCIN) 1,250 mg in sodium chloride 0 9 % 250 mL IVPB  1,250 mg Intravenous Q8H     Allergies   Allergen Reactions    Cat Hair Extract Itching    Dog Epithelium     Latex     Pollen Extract        Objective   First Vitals:   Blood Pressure: (!) 151/118 (07/11/21 1551)  Pulse: 89 (07/11/21 1551)  Temperature: 98 1 °F (36 7 °C) (07/11/21 1811)  Temp Source: Oral (07/11/21 1811)  Respirations: (!) 30 (07/11/21 1551)  Height: 5' 3" (160 cm) (07/12/21 1500)  Weight - Scale: 59 kg (130 lb) (07/12/21 1500)  SpO2: 97 % (07/11/21 1551)    Current Vitals:   Blood Pressure: (!) 97/48 (07/16/21 0628)  Pulse: (!) 50 (07/14/21 2159)  Temperature: 98 1 °F (36 7 °C) (07/16/21 0628)  Temp Source: Oral (07/14/21 0006)  Respirations: 18 (07/16/21 0628)  Height: 5' 3" (160 cm) (07/12/21 1500)  Weight - Scale: 60 2 kg (132 lb 11 5 oz) (07/15/21 0600)  SpO2: 99 % (07/13/21 2200)      Intake/Output Summary (Last 24 hours) at 7/16/2021 1032  Last data filed at 7/15/2021 1531  Gross per 24 hour   Intake 240 ml   Output --   Net 240 ml       Invasive Devices     Peripheral Intravenous Line            Peripheral IV 07/11/21 Right;Upper;Medial Arm 4 days                Physical Exam  GEN: NAD  HEENT: MMM  CV: warm/well perfused  Lung: normal effort  Ab: Soft, NT/ND  Integumentary: Left forearm mass, tender, nonerythematous, fluctuant, no crepitus  Right ankle mass is likewise tender, erythematous, no appreciable discharge, fluctuant, no crepitus  Neuro: A+Ox3, motor and sensation grossly intact    Lab Results: CBC: No results found for: WBC, HGB, HCT, MCV, PLT, ADJUSTEDWBC, MCH, MCHC, RDW, MPV, NRBC, CMP: No results found for: SODIUM, K, CL, CO2, ANIONGAP, BUN, CREATININE, GLUCOSE, CALCIUM, AST, ALT, ALKPHOS, PROT, BILITOT, EGFR, Coagulation: No results found for: PT, INR, APTT, Urinalysis: No results found for: Sharia Jacinta, SPECGRAV, PHUR, LEUKOCYTESUR, NITRITE, PROTEINUA, GLUCOSEU, KETONESU, BILIRUBINUR, BLOODU, Lipase: No results found for: LIPASE  Imaging: I have personally reviewed pertinent reports  and I have personally reviewed pertinent films in PACS  CT recon only lumbar spine (No Charge)   Final Result by Pat Cotto MD (07/13 6823)      No CT findings consistent with discitis osteomyelitis  No paraspinal soft tissue pathology  Chronic left L5 spondylolysis without spondylolisthesis  No fracture or traumatic subluxation  Groundglass opacities at the lung base as described on concurrent CT of the abdomen  Workstation performed: [id number]         XR foot 3+ vw left   Final Result by Irina Rangel MD (07/13 4414)      No acute osseous abnormality  Workstation performed: [id number]         XR foot 3+ vw right   Final Result by Irina Rangel MD (07/13 1627)      No acute osseous abnormality  Workstation performed: [id number]         CT abdomen pelvis with contrast   Final Result by Philly Alvarado MD (07/12 3316)      Small scattered peripheral groundglass opacities within bilateral lung bases may be due to pneumonia, septic emboli in the setting of IV drug use, or Covid-19 infection  The study was marked in [facility] for immediate notification              Workstation performed: [id number]

## 2021-07-16 NOTE — QUICK NOTE
General Surgery Service - Patient refusal of treatment    Assessment:    Gurpreet Quijano is a 31 y o  female with hx IVDU c/b TV endocarditis s/p repair on Eliquis presenting with back pain, MRSA bacteremia and cellulitis  Surgery consulted for treatment of left forearm and right ankle abscesses  Course of Treatment:    Red surgery team presented to the patient's bedside to explain the suggested course of treatment  Informed consent was attempted to be obtained  Patient was presented with the benefits of incision and drainage of her abscesses  The procedure was described in detail to the patient  All attempts to make the patient comfortable were addressed  Risks were discussed including bleeding, damage to nearby structures, and need for additional procedures  Additionally the risks of not performing an incision and drainage of her abscesses were described including bacteremia, further spreading of infection, and limb loss  Patient ultimately did not provide informed consent to proceed with the procedure and refused incision and drainage  All attempts to address and answer patient's questions and concerns were made  Will defer continued treatment to primary team as patient does not wish to proceed with recommended treatment of incision and drainage      For any questions or concerns please TigerText the Red Surgery Team

## 2021-07-16 NOTE — PROCEDURES
Insert PICC line    Date/Time: 7/16/2021 11:49 AM  Performed by: Delmis Braden RN  Authorized by: Camilla Aguilar, [address] [place] [address]     Patient location:  Bedside  Other Assisting Provider: Yes (comment) Loraine Blancy Infusion tech)    Consent:     Consent obtained:  Written (Consent obtained by physician)    Consent given by:  Patient  Universal protocol:     Procedure explained and questions answered to patient or proxy's satisfaction: yes      Relevant documents present and verified: yes      Test results available and properly labeled: yes      Radiology Images displayed and confirmed  If images not available, report reviewed: yes      Required blood products, implants, devices, and special equipment available: yes      Site/side marked: yes      Immediately prior to procedure, a time out was called: yes      Patient identity confirmed:  Verbally with patient and arm band  Pre-procedure details:     Hand hygiene: Hand hygiene performed prior to insertion      Sterile barrier technique: All elements of maximal sterile technique followed      Skin preparation:  ChloraPrep    Skin preparation agent: Skin preparation agent completely dried prior to procedure    Indications:     PICC line indications: long term antibiotics    Anesthesia (see MAR for exact dosages):      Anesthesia method:  Local infiltration    Local anesthetic:  Lidocaine 1% w/o epi (5ml)  Procedure details:     Location:  Cephalic    Vessel type: vein      Laterality:  Left    Approach: percutaneous technique used      Patient position:  Flat    Procedural supplies:  Double lumen    Catheter size:  5 Fr    Landmarks identified: yes      Ultrasound guidance: yes      Ultrasound image availability:  Not saved    Sterile ultrasound techniques: Sterile gel and sterile probe covers were used      Number of attempts:  1    Successful placement: yes      Vessel of catheter tip end:  Sherlock 3CG confirmed    Total catheter length (cm):  41    Catheter out on skin (cm):  2    Max flow rate:  999    Arm circumference:  25  Post-procedure details:     Post-procedure:  Securement device placed and dressing applied    Assessment:  Blood return through all ports and free fluid flow    Post-procedure complications: none      Patient tolerance of procedure: Tolerated well, no immediate complications  Comments:      Pt with long history of PICC lines that were unable to thread  Cephalic used due to accessibility of vein   Basilic has not threaded in past

## 2021-07-16 NOTE — ASSESSMENT & PLAN NOTE
· Right lateral ankle and left forearm  · General surgery attempted bedside and yesterday however patient refused  Will premedicate with intravenous Valium  Agreeable today  Will notify general surgery

## 2021-07-17 LAB
ANION GAP SERPL CALCULATED.3IONS-SCNC: 6 MMOL/L (ref 4–13)
BASOPHILS # BLD AUTO: 0.03 THOUSANDS/ΜL (ref 0–0.1)
BASOPHILS NFR BLD AUTO: 0 % (ref 0–1)
BUN SERPL-MCNC: 6 MG/DL (ref 5–25)
CALCIUM SERPL-MCNC: 8.2 MG/DL (ref 8.3–10.1)
CHLORIDE SERPL-SCNC: 107 MMOL/L (ref 100–108)
CO2 SERPL-SCNC: 27 MMOL/L (ref 21–32)
CREAT SERPL-MCNC: 0.48 MG/DL (ref 0.6–1.3)
EOSINOPHIL # BLD AUTO: 0.28 THOUSAND/ΜL (ref 0–0.61)
EOSINOPHIL NFR BLD AUTO: 4 % (ref 0–6)
ERYTHROCYTE [DISTWIDTH] IN BLOOD BY AUTOMATED COUNT: 12.5 % (ref 11.6–15.1)
GFR SERPL CREATININE-BSD FRML MDRD: 134 ML/MIN/1.73SQ M
GLUCOSE SERPL-MCNC: 94 MG/DL (ref 65–140)
HCT VFR BLD AUTO: 31.2 % (ref 34.8–46.1)
HGB BLD-MCNC: 9.7 G/DL (ref 11.5–15.4)
IMM GRANULOCYTES # BLD AUTO: 0.09 THOUSAND/UL (ref 0–0.2)
IMM GRANULOCYTES NFR BLD AUTO: 1 % (ref 0–2)
LYMPHOCYTES # BLD AUTO: 3.01 THOUSANDS/ΜL (ref 0.6–4.47)
LYMPHOCYTES NFR BLD AUTO: 42 % (ref 14–44)
MCH RBC QN AUTO: 28.6 PG (ref 26.8–34.3)
MCHC RBC AUTO-ENTMCNC: 31.1 G/DL (ref 31.4–37.4)
MCV RBC AUTO: 92 FL (ref 82–98)
MONOCYTES # BLD AUTO: 0.59 THOUSAND/ΜL (ref 0.17–1.22)
MONOCYTES NFR BLD AUTO: 8 % (ref 4–12)
NEUTROPHILS # BLD AUTO: 3.25 THOUSANDS/ΜL (ref 1.85–7.62)
NEUTS SEG NFR BLD AUTO: 45 % (ref 43–75)
NRBC BLD AUTO-RTO: 0 /100 WBCS
PLATELET # BLD AUTO: 339 THOUSANDS/UL (ref 149–390)
PMV BLD AUTO: 9 FL (ref 8.9–12.7)
POTASSIUM SERPL-SCNC: 3.8 MMOL/L (ref 3.5–5.3)
RBC # BLD AUTO: 3.39 MILLION/UL (ref 3.81–5.12)
SODIUM SERPL-SCNC: 140 MMOL/L (ref 136–145)
VANCOMYCIN TROUGH SERPL-MCNC: 21.6 UG/ML (ref 10–20)
VANCOMYCIN TROUGH SERPL-MCNC: 21.9 UG/ML (ref 10–20)
WBC # BLD AUTO: 7.25 THOUSAND/UL (ref 4.31–10.16)

## 2021-07-17 PROCEDURE — 85025 COMPLETE CBC W/AUTO DIFF WBC: CPT | Performed by: NURSE PRACTITIONER

## 2021-07-17 PROCEDURE — 99233 SBSQ HOSP IP/OBS HIGH 50: CPT | Performed by: INTERNAL MEDICINE

## 2021-07-17 PROCEDURE — 80202 ASSAY OF VANCOMYCIN: CPT | Performed by: INTERNAL MEDICINE

## 2021-07-17 PROCEDURE — 99232 SBSQ HOSP IP/OBS MODERATE 35: CPT | Performed by: NURSE PRACTITIONER

## 2021-07-17 PROCEDURE — 80048 BASIC METABOLIC PNL TOTAL CA: CPT | Performed by: NURSE PRACTITIONER

## 2021-07-17 RX ORDER — LIDOCAINE HYDROCHLORIDE 10 MG/ML
20 INJECTION, SOLUTION EPIDURAL; INFILTRATION; INTRACAUDAL; PERINEURAL ONCE
Status: DISCONTINUED | OUTPATIENT
Start: 2021-07-17 | End: 2021-07-18 | Stop reason: HOSPADM

## 2021-07-17 RX ORDER — DIAZEPAM 5 MG/ML
2.5 INJECTION, SOLUTION INTRAMUSCULAR; INTRAVENOUS ONCE
Status: COMPLETED | OUTPATIENT
Start: 2021-07-17 | End: 2021-07-18

## 2021-07-17 RX ADMIN — CLONIDINE HYDROCHLORIDE 0.1 MG: 0.1 TABLET ORAL at 08:25

## 2021-07-17 RX ADMIN — OXYCODONE HYDROCHLORIDE 15 MG: 10 TABLET ORAL at 11:12

## 2021-07-17 RX ADMIN — APIXABAN 5 MG: 5 TABLET, FILM COATED ORAL at 08:24

## 2021-07-17 RX ADMIN — OXYCODONE HYDROCHLORIDE 15 MG: 10 TABLET ORAL at 16:22

## 2021-07-17 RX ADMIN — CLONAZEPAM 0.5 MG: 0.5 TABLET ORAL at 18:13

## 2021-07-17 RX ADMIN — APIXABAN 5 MG: 5 TABLET, FILM COATED ORAL at 18:13

## 2021-07-17 RX ADMIN — METHOCARBAMOL TABLETS 750 MG: 750 TABLET, COATED ORAL at 16:22

## 2021-07-17 RX ADMIN — OXYCODONE HYDROCHLORIDE 15 MG: 10 TABLET ORAL at 22:03

## 2021-07-17 RX ADMIN — ACETAMINOPHEN 975 MG: 325 TABLET, FILM COATED ORAL at 14:02

## 2021-07-17 RX ADMIN — HYDROMORPHONE HYDROCHLORIDE 0.5 MG: 1 INJECTION, SOLUTION INTRAMUSCULAR; INTRAVENOUS; SUBCUTANEOUS at 18:41

## 2021-07-17 RX ADMIN — VANCOMYCIN HYDROCHLORIDE 1250 MG: 10 INJECTION, POWDER, LYOPHILIZED, FOR SOLUTION INTRAVENOUS at 05:29

## 2021-07-17 RX ADMIN — HYDROMORPHONE HYDROCHLORIDE 0.5 MG: 1 INJECTION, SOLUTION INTRAMUSCULAR; INTRAVENOUS; SUBCUTANEOUS at 06:34

## 2021-07-17 RX ADMIN — OXYCODONE HYDROCHLORIDE 15 MG: 10 TABLET ORAL at 05:22

## 2021-07-17 RX ADMIN — CLONAZEPAM 0.5 MG: 0.5 TABLET ORAL at 08:24

## 2021-07-17 RX ADMIN — ACETAMINOPHEN 975 MG: 325 TABLET, FILM COATED ORAL at 05:22

## 2021-07-17 RX ADMIN — METHOCARBAMOL TABLETS 750 MG: 750 TABLET, COATED ORAL at 08:24

## 2021-07-17 RX ADMIN — GABAPENTIN 200 MG: 100 CAPSULE ORAL at 18:13

## 2021-07-17 RX ADMIN — CLONIDINE HYDROCHLORIDE 0.1 MG: 0.1 TABLET ORAL at 22:04

## 2021-07-17 RX ADMIN — NICOTINE 1 PATCH: 21 PATCH, EXTENDED RELEASE TRANSDERMAL at 08:24

## 2021-07-17 RX ADMIN — HYDROMORPHONE HYDROCHLORIDE 0.5 MG: 1 INJECTION, SOLUTION INTRAMUSCULAR; INTRAVENOUS; SUBCUTANEOUS at 12:48

## 2021-07-17 RX ADMIN — ACETAMINOPHEN 975 MG: 325 TABLET, FILM COATED ORAL at 22:03

## 2021-07-17 RX ADMIN — ARIPIPRAZOLE 10 MG: 10 TABLET ORAL at 08:25

## 2021-07-17 RX ADMIN — GABAPENTIN 200 MG: 100 CAPSULE ORAL at 08:24

## 2021-07-17 RX ADMIN — VANCOMYCIN HYDROCHLORIDE 1750 MG: 10 INJECTION, POWDER, LYOPHILIZED, FOR SOLUTION INTRAVENOUS at 22:04

## 2021-07-17 NOTE — PROGRESS NOTES
Patient c/o left medial ankle pain  Left medial ankle red and swollen  Olga Fam made aware  Pain medications given as ordered

## 2021-07-17 NOTE — PROGRESS NOTES
Vancomycin IV Pharmacy-to-Dose Consultation    Dontae Vegas is a 31 y o  female who is currently receiving Vancomycin IV with management by the Pharmacy Consult service  Assessment/Plan:  The patient was reviewed  Renal function is stable and no signs or symptoms of nephrotoxicity and/or infusion reactions were documented in the chart  Based on todays assessment and a trough of 21 9 (first trough was thought to be drawn inappropriately (timing of trough was after dose had been started), so a second trough was ordered and still cam back supra-therapeutic) we will be changing the current vancomycin (day # 5) dosing of 1250mg IV Q12h to a new dose of 1750mg IV Q12h, with a plan for trough to be drawn at 0800 on 7/19  We will continue to follow the patients culture results and clinical progress daily      Maria Antonia Zapata, Pharmacist

## 2021-07-17 NOTE — PLAN OF CARE
Problem: MOBILITY - ADULT  Goal: Maintain or return to baseline ADL function  Description: INTERVENTIONS:  -  Assess patient's ability to carry out ADLs; assess patient's baseline for ADL function and identify physical deficits which impact ability to perform ADLs (bathing, care of mouth/teeth, toileting, grooming, dressing, etc )  - Assess/evaluate cause of self-care deficits   - Assess range of motion  - Assess patient's mobility; develop plan if impaired  - Assess patient's need for assistive devices and provide as appropriate  - Encourage maximum independence but intervene and supervise when necessary  - Involve family in performance of ADLs  - Assess for home care needs following discharge   - Consider OT consult to assist with ADL evaluation and planning for discharge  - Provide patient education as appropriate  Outcome: Progressing  Goal: Maintains/Returns to pre admission functional level  Description: INTERVENTIONS:  - Perform BMAT or MOVE assessment daily    - Set and communicate daily mobility goal to care team and patient/family/caregiver     - Collaborate with rehabilitation services on mobility goals if consulted  - Perform Range of Motion   - Reposition patient every   - Dangle patient   - Stand patient   - Ambulate patient   - Out of bed to chair   - Out of bed for meals   - Out of bed for toileting  - Record patient progress and toleration of activity level   Outcome: Progressing     Problem: PAIN - ADULT  Goal: Verbalizes/displays adequate comfort level or baseline comfort level  Description: Interventions:  - Encourage patient to monitor pain and request assistance  - Assess pain using appropriate pain scale  - Administer analgesics based on type and severity of pain and evaluate response  - Implement non-pharmacological measures as appropriate and evaluate response  - Consider cultural and social influences on pain and pain management  - Notify physician/advanced practitioner if interventions unsuccessful or patient reports new pain  Outcome: Progressing     Problem: INFECTION - ADULT  Goal: Absence or prevention of progression during hospitalization  Description: INTERVENTIONS:  - Assess and monitor for signs and symptoms of infection  - Monitor lab/diagnostic results  - Monitor all insertion sites, i e  indwelling lines, tubes, and drains  - Monitor endotracheal if appropriate and nasal secretions for changes in amount and color  - [place] appropriate cooling/warming therapies per order  - Administer medications as ordered  - Instruct and encourage patient and family to use good hand hygiene technique  - Identify and instruct in appropriate isolation precautions for identified infection/condition  Outcome: Progressing  Goal: Absence of fever/infection during neutropenic period  Description: INTERVENTIONS:  - Monitor WBC    Outcome: Progressing     Problem: SAFETY ADULT  Goal: Maintain or return to baseline ADL function  Description: INTERVENTIONS:  -  Assess patient's ability to carry out ADLs; assess patient's baseline for ADL function and identify physical deficits which impact ability to perform ADLs (bathing, care of mouth/teeth, toileting, grooming, dressing, etc )  - Assess/evaluate cause of self-care deficits   - Assess range of motion  - Assess patient's mobility; develop plan if impaired  - Assess patient's need for assistive devices and provide as appropriate  - Encourage maximum independence but intervene and supervise when necessary  - Involve family in performance of ADLs  - Assess for home care needs following discharge   - Consider OT consult to assist with ADL evaluation and planning for discharge  - Provide patient education as appropriate  Outcome: Progressing  Goal: Maintains/Returns to pre admission functional level  Description: INTERVENTIONS:  - Perform BMAT or MOVE assessment daily    - Set and communicate daily mobility goal to care team and patient/family/caregiver     - Collaborate with rehabilitation services on mobility goals if consulted  - Perform Range of Motion   - Reposition patient every   - Dangle patient   - Stand patient   - Ambulate patient   - Out of bed to chair   - Out of bed for meals   - Out of bed for toileting  - Record patient progress and toleration of activity level   Outcome: Progressing  Goal: Patient will remain free of falls  Description: INTERVENTIONS:  - Educate patient/family on patient safety including physical limitations  - Instruct patient to call for assistance with activity   - Consult OT/PT to assist with strengthening/mobility   - Keep Call bell within reach  - Keep bed low and locked with side rails adjusted as appropriate  - Keep care items and personal belongings within reach  - Initiate and maintain comfort rounds  - Make Fall Risk Sign visible to staff  - Offer Toileting every  in advance of need  - Initiate/Maintain   - Obtain necessary fall risk management equipment:   - Apply yellow socks and bracelet for high fall risk patients  - Consider moving patient to room near nurses station  Outcome: Progressing     Problem: DISCHARGE PLANNING  Goal: Discharge to home or other facility with appropriate resources  Description: INTERVENTIONS:  - Identify barriers to discharge w/patient and caregiver  - Arrange for needed discharge resources and transportation as appropriate  - Identify discharge learning needs (meds, wound care, etc )  - Arrange for interpretive services to assist at discharge as needed  - Refer to Case Management Department for coordinating discharge planning if the patient needs post-hospital services based on physician/advanced practitioner order or complex needs related to functional status, cognitive ability, or social support system  Outcome: Progressing     Problem: Knowledge Deficit  Goal: Patient/family/caregiver demonstrates understanding of disease process, treatment plan, medications, and discharge instructions  Description: Complete learning assessment and assess knowledge base  Interventions:  - Provide teaching at level of understanding  - Provide teaching via preferred learning methods  Outcome: Progressing     Problem: Prexisting or High Potential for Compromised Skin Integrity  Goal: Skin integrity is maintained or improved  Description: INTERVENTIONS:  - Identify patients at risk for skin breakdown  - Assess and monitor skin integrity  - Assess and monitor nutrition and hydration status  - Monitor labs   - Assess for incontinence   - Turn and reposition patient  - Assist with mobility/ambulation  - Relieve pressure over bony prominences  - Avoid friction and shearing  - Provide appropriate hygiene as needed including keeping skin clean and dry  - Evaluate need for skin moisturizer/barrier cream  - Collaborate with interdisciplinary team   - Patient/family teaching  - Consider wound care consult   Outcome: Progressing     Problem: Nutrition/Hydration-ADULT  Goal: Nutrient/Hydration intake appropriate for improving, restoring or maintaining nutritional needs  Description: Monitor and assess patient's nutrition/hydration status for malnutrition  Collaborate with interdisciplinary team and initiate plan and interventions as ordered  Monitor patient's weight and dietary intake as ordered or per policy  Utilize nutrition screening tool and intervene as necessary  Determine patient's food preferences and provide high-protein, high-caloric foods as appropriate       INTERVENTIONS:  - Monitor oral intake, urinary output, labs, and treatment plans  - Assess nutrition and hydration status and recommend course of action  - Evaluate amount of meals eaten  - Assist patient with eating if necessary   - Allow adequate time for meals  - Recommend/ encourage appropriate diets, oral nutritional supplements, and vitamin/mineral supplements  - Order, calculate, and assess calorie counts as needed  - Recommend, monitor, and adjust tube feedings and TPN/PPN based on assessed needs  - Assess need for intravenous fluids  - Provide specific nutrition/hydration education as appropriate  - Include patient/family/caregiver in decisions related to nutrition  Outcome: Progressing     Problem: Potential for Falls  Goal: Patient will remain free of falls  Description: INTERVENTIONS:  - Educate patient/family on patient safety including physical limitations  - Instruct patient to call for assistance with activity   - Consult OT/PT to assist with strengthening/mobility   - Keep Call bell within reach  - Keep bed low and locked with side rails adjusted as appropriate  - Keep care items and personal belongings within reach  - Initiate and maintain comfort rounds  - Make Fall Risk Sign visible to staff  - Offer Toileting every in advance of need  - Initiate/Maintain   - Obtain necessary fall risk management equipment:   - Apply yellow socks and bracelet for high fall risk patients  - Consider moving patient to room near nurses station  Outcome: Progressing

## 2021-07-17 NOTE — PROGRESS NOTES
[address] [facility]  Progress Note - Christopher Grant 1990, 31 y o  female MRN: [id number]  Unit/Bed#: [facility] 920-01 Encounter: [id number]  Primary Care Provider: No primary care provider on file  Date and time admitted to hospital: 7/11/2021  3:46 PM    * Low back pain  Assessment & Plan  · Patient presents to the ED with severe low back pain  Found to have b/l lower extremity cellulitis in the setting of IVDU and gram + bacteremia  · CT abdomen and pelvis negative for acute abdominal pathology  · Given ongoing intravenous drug use and bacteremia, diskitis/osteomyelitis/abscess needs to be considered  Refused MRI as she will not remove her nipple piercings and also patient does have retained needle in her neck from prior admission  CT recon  lumbar spine negative  · APS following, continue ATC APAP, lidocaine patch, gabapentin, Robaxin, p r n  Oxycodone 10/15 and intravenous Dilaudid for breakthrough  Septic embolism (HCC)  Assessment & Plan  · Small scattered peripheral ground-glass opacities within the bilateral lung bases possibly due to pneumonia versus septic emboli in the setting of intravenous drug use  Id following, also need to consider aspiration from loss of consciousness from intravenous drug intoxication  · COVID-19 PCR negative  · No respiratory complaints and oxygen saturation adequate on room air  Cellulitis of lower extremity  Assessment & Plan  · Bilateral great toe erythema/swelling and significant tenderness  Now with right ankle abscess see below  · X-rays negative  · Intravenous antibiotics with vancomycin  · ID following  · Serial exams    Abscess  Assessment & Plan  · Right lateral ankle and left forearm  · General surgery attempted bedside and yesterday however patient refused  Will premedicate with intravenous Valium  Agreeable today  Will notify general surgery      MRSA bacteremia  Assessment & Plan  · 1 of 1 blood culture from admission growing MRSA  Repeat blood cultures negative at 48 hours  · Continue vancomycin    · Echo pending  · Will need 6 weeks IV abx from negative blood culture  PICC placed 7/16  Heroin abuse ([facility] [address] )  Assessment & Plan  · Patient admits to recent intravenous cocaine and fentanyl use  · Monitor closely for withdrawal   Continue clonidine 0 2 b i d  · CM following, refused HOST  Chronic anticoagulation  Assessment & Plan  · Continue Eliquis 5 mg twice daily  Bipolar 1 disorder (HCC)  Assessment & Plan  · Continue Abilify 10 mg daily  · Klonopin 0 5 mg twice daily  · Prazosin 1 mg q h s     History of endocarditis  Assessment & Plan  · 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  Patient completed six week course of antibiotics at this time  · Now with positive blood cultures again  Echo pending  No need for MELISSA as would not   VTE Pharmacologic Prophylaxis:   Moderate Risk (Score 3-4) - Pharmacological DVT Prophylaxis Ordered: apixaban (Eliquis)  Patient Centered Rounds: I performed bedside rounds with nursing staff today  Discussions with Specialists or Other Care Team Provider: nursing, gen surg    Education and Discussions with Family / Patient: Patient declined call to   Time Spent for Care: 20 minutes  More than 50% of total time spent on counseling and coordination of care as described above  Current Length of Stay: 5 day(s)  Current Patient Status: Inpatient   Certification Statement: The patient will continue to require additional inpatient hospital stay due to IV abx  Discharge Plan: Anticipate discharge in >72 hrs to home  Code Status: Level 1 - Full Code    Subjective:   Patient continues to complain of low back pain  States that she refused I and D at the bedside yesterday secondary to anxiety  We discussed importance of compliance with medical therapy and importance of I&D of abscesses    Will premedicate with Valium, patient now agreeable  Objective:     Vitals:   Temp (24hrs), Av 7 °F (37 1 °C), Min:98 5 °F (36 9 °C), Max:98 8 °F (37 1 °C)    Temp:  [98 5 °F (36 9 °C)-98 8 °F (37 1 °C)] 98 8 °F (37 1 °C)  HR:  [49] 49  Resp:  [16-18] 18  BP: (112-115)/(64-68) 113/64  SpO2:  [99 %] 99 %  Body mass index is 23 51 kg/m²  Input and Output Summary (last 24 hours): Intake/Output Summary (Last 24 hours) at 2021 1111  Last data filed at 2021 1201  Gross per 24 hour   Intake --   Output 1 ml   Net -1 ml       Physical Exam:   Physical Exam  Vitals and nursing note reviewed  Constitutional:       General: She is not in acute distress  Cardiovascular:      Rate and Rhythm: Normal rate  Pulmonary:      Breath sounds: Normal breath sounds  Abdominal:      Tenderness: There is no abdominal tenderness  Musculoskeletal:         General: Swelling (Bilateral great toes, improving) and tenderness (Low back) present  Skin:     Comments: Right ankle abscess, left forearm abscess  Generalized scratches, needle sticks/scabs   Neurological:      Mental Status: She is alert and oriented to person, place, and time  Mental status is at baseline     Psychiatric:         Mood and Affect: Mood normal           Additional Data:     Labs:  Results from last 7 days   Lab Units 21  0530 21  0454   WBC Thousand/uL 7 25 8 46   HEMOGLOBIN g/dL 9 7* 9 4*   HEMATOCRIT % 31 2* 28 4*   PLATELETS Thousands/uL 339 265   BANDS PCT %  --  6   NEUTROS PCT % 45  --    LYMPHS PCT % 42  --    LYMPHO PCT %  --  14   MONOS PCT % 8  --    MONO PCT %  --  2*   EOS PCT % 4 0     Results from last 7 days   Lab Units 21  0530 21  0621   SODIUM mmol/L 140 140   POTASSIUM mmol/L 3 8 3 2*   CHLORIDE mmol/L 107 107   CO2 mmol/L 27 26   BUN mg/dL 6 4*   CREATININE mg/dL 0 48* 0 41*   ANION GAP mmol/L 6 7   CALCIUM mg/dL 8 2* 7 7*   ALBUMIN g/dL  --  2 2*   TOTAL BILIRUBIN mg/dL  --  0 44   ALK PHOS U/L --  114   ALT U/L  --  32   AST U/L  --  14   GLUCOSE RANDOM mg/dL 94 95     Results from last 7 days   Lab Units 07/12/21  0430   INR  1 17             Results from last 7 days   Lab Units 07/13/21  0621 07/12/21  0430 07/11/21  1805   LACTIC ACID mmol/L  --   --  1 2   PROCALCITONIN ng/ml 7 79* 0 77*  --        Lines/Drains:  Invasive Devices     Peripherally Inserted Central Catheter Line            PICC Line 78/69/82 Left Cephalic <1 day                Central Line:  Goal for removal: Will discontinue when peripheral access obtained  Imaging: No pertinent imaging reviewed  Recent Cultures (last 7 days):   Results from last 7 days   Lab Units 07/13/21  0623 07/13/21  0622 07/12/21  0210 07/11/21  1805   BLOOD CULTURE  No Growth After 4 Days  No Growth After 4 Days    --  Methicillin Resistant Staphylococcus aureus*   GRAM STAIN RESULT   --   --   --  Gram positive cocci in clusters*   URINE CULTURE   --   --  40,000-49,000 cfu/ml   --        Last 24 Hours Medication List:   Current Facility-Administered Medications   Medication Dose Route Frequency Provider Last Rate    acetaminophen  975 mg Oral Q8H [facility] BOO Kay      aluminum-magnesium hydroxide-simethicone  30 mL Oral Q6H PRN London Keys MD      apixaban  5 mg Oral BID London Keys MD      ARIPiprazole  10 mg Oral Daily London Keys MD      clonazePAM  0 5 mg Oral BID BOO Kay      cloNIDine  0 1 mg Oral Q12H [facility] BOO Kay      diazepam  2 5 mg Intravenous Once BOO Christian      docusate sodium  100 mg Oral BID PRN London Keys MD      gabapentin  200 mg Oral BID BOO Kay      HYDROmorphone  0 5 mg Intravenous Q6H PRN BOO Kay      methocarbamol  750 mg Oral Q6H PRN Chas Carpenter PA-C      nicotine  1 patch Transdermal Daily London Keys MD      ondansetron  4 mg Intravenous Q6H PRN London Keys MD      oxyCODONE  10 mg Oral Q4H PRN [employer] BOO Sampson      oxyCODONE  15 mg Oral Q4H PRN BOO Kay      polyethylene glycol  17 g Oral Daily BOO Kay      prazosin  1 mg Oral HS London Keys MD      vancomycin  1,250 mg Intravenous Q8H Elias Foster MD 1,250 mg (07/17/21 4097)        Today, Patient Was Seen By: BOO Christian    **Please Note: This note may have been constructed using a voice recognition system  **

## 2021-07-18 VITALS
TEMPERATURE: 98 F | BODY MASS INDEX: 26.02 KG/M2 | HEART RATE: 68 BPM | OXYGEN SATURATION: 99 % | DIASTOLIC BLOOD PRESSURE: 75 MMHG | HEIGHT: 63 IN | WEIGHT: 146.83 LBS | RESPIRATION RATE: 18 BRPM | SYSTOLIC BLOOD PRESSURE: 126 MMHG

## 2021-07-18 LAB
BACTERIA BLD CULT: NORMAL
BACTERIA BLD CULT: NORMAL

## 2021-07-18 PROCEDURE — 87205 SMEAR GRAM STAIN: CPT | Performed by: SURGERY

## 2021-07-18 PROCEDURE — 99233 SBSQ HOSP IP/OBS HIGH 50: CPT | Performed by: INTERNAL MEDICINE

## 2021-07-18 PROCEDURE — 99239 HOSP IP/OBS DSCHRG MGMT >30: CPT | Performed by: NURSE PRACTITIONER

## 2021-07-18 PROCEDURE — 87075 CULTR BACTERIA EXCEPT BLOOD: CPT | Performed by: SURGERY

## 2021-07-18 PROCEDURE — 10061 I&D ABSCESS COMP/MULTIPLE: CPT | Performed by: SURGERY

## 2021-07-18 PROCEDURE — 87186 SC STD MICRODIL/AGAR DIL: CPT | Performed by: SURGERY

## 2021-07-18 PROCEDURE — 0J9H0ZZ DRAINAGE OF LEFT LOWER ARM SUBCUTANEOUS TISSUE AND FASCIA, OPEN APPROACH: ICD-10-PCS | Performed by: SURGERY

## 2021-07-18 PROCEDURE — 87070 CULTURE OTHR SPECIMN AEROBIC: CPT | Performed by: SURGERY

## 2021-07-18 RX ORDER — HYDROMORPHONE HCL/PF 1 MG/ML
0.5 SYRINGE (ML) INJECTION ONCE
Status: COMPLETED | OUTPATIENT
Start: 2021-07-18 | End: 2021-07-18

## 2021-07-18 RX ORDER — DIAZEPAM 5 MG/ML
2.5 INJECTION, SOLUTION INTRAMUSCULAR; INTRAVENOUS ONCE
Status: COMPLETED | OUTPATIENT
Start: 2021-07-18 | End: 2021-07-18

## 2021-07-18 RX ADMIN — ACETAMINOPHEN 975 MG: 325 TABLET, FILM COATED ORAL at 14:33

## 2021-07-18 RX ADMIN — CLONAZEPAM 0.5 MG: 0.5 TABLET ORAL at 17:17

## 2021-07-18 RX ADMIN — GABAPENTIN 200 MG: 100 CAPSULE ORAL at 09:18

## 2021-07-18 RX ADMIN — CLONIDINE HYDROCHLORIDE 0.1 MG: 0.1 TABLET ORAL at 14:34

## 2021-07-18 RX ADMIN — HYDROMORPHONE HYDROCHLORIDE 0.5 MG: 1 INJECTION, SOLUTION INTRAMUSCULAR; INTRAVENOUS; SUBCUTANEOUS at 15:51

## 2021-07-18 RX ADMIN — DIAZEPAM 2.5 MG: 10 INJECTION, SOLUTION INTRAMUSCULAR; INTRAVENOUS at 14:55

## 2021-07-18 RX ADMIN — ARIPIPRAZOLE 10 MG: 10 TABLET ORAL at 09:17

## 2021-07-18 RX ADMIN — APIXABAN 5 MG: 5 TABLET, FILM COATED ORAL at 09:18

## 2021-07-18 RX ADMIN — ACETAMINOPHEN 975 MG: 325 TABLET, FILM COATED ORAL at 05:55

## 2021-07-18 RX ADMIN — HYDROMORPHONE HYDROCHLORIDE 0.5 MG: 1 INJECTION, SOLUTION INTRAMUSCULAR; INTRAVENOUS; SUBCUTANEOUS at 16:38

## 2021-07-18 RX ADMIN — GABAPENTIN 200 MG: 100 CAPSULE ORAL at 17:17

## 2021-07-18 RX ADMIN — LIDOCAINE HYDROCHLORIDE 30 ML: 10; .005 INJECTION, SOLUTION EPIDURAL; INFILTRATION; INTRACAUDAL; PERINEURAL at 17:18

## 2021-07-18 RX ADMIN — OXYCODONE HYDROCHLORIDE 15 MG: 10 TABLET ORAL at 05:58

## 2021-07-18 RX ADMIN — DIAZEPAM 2.5 MG: 5 INJECTION, SOLUTION INTRAMUSCULAR; INTRAVENOUS at 15:50

## 2021-07-18 RX ADMIN — NICOTINE 1 PATCH: 21 PATCH, EXTENDED RELEASE TRANSDERMAL at 09:20

## 2021-07-18 RX ADMIN — HYDROMORPHONE HYDROCHLORIDE 0.5 MG: 1 INJECTION, SOLUTION INTRAMUSCULAR; INTRAVENOUS; SUBCUTANEOUS at 14:30

## 2021-07-18 RX ADMIN — VANCOMYCIN HYDROCHLORIDE 1750 MG: 10 INJECTION, POWDER, LYOPHILIZED, FOR SOLUTION INTRAVENOUS at 09:28

## 2021-07-18 RX ADMIN — METHOCARBAMOL TABLETS 750 MG: 750 TABLET, COATED ORAL at 10:39

## 2021-07-18 RX ADMIN — HYDROMORPHONE HYDROCHLORIDE 0.5 MG: 1 INJECTION, SOLUTION INTRAMUSCULAR; INTRAVENOUS; SUBCUTANEOUS at 07:45

## 2021-07-18 RX ADMIN — CLONAZEPAM 0.5 MG: 0.5 TABLET ORAL at 09:13

## 2021-07-18 RX ADMIN — APIXABAN 5 MG: 5 TABLET, FILM COATED ORAL at 17:17

## 2021-07-18 RX ADMIN — HYDROMORPHONE HYDROCHLORIDE 0.5 MG: 1 INJECTION, SOLUTION INTRAMUSCULAR; INTRAVENOUS; SUBCUTANEOUS at 00:52

## 2021-07-18 RX ADMIN — OXYCODONE HYDROCHLORIDE 15 MG: 10 TABLET ORAL at 10:38

## 2021-07-18 NOTE — PLAN OF CARE
Problem: MOBILITY - ADULT  Goal: Maintain or return to baseline ADL function  Description: INTERVENTIONS:  -  Assess patient's ability to carry out ADLs; assess patient's baseline for ADL function and identify physical deficits which impact ability to perform ADLs (bathing, care of mouth/teeth, toileting, grooming, dressing, etc )  - Assess/evaluate cause of self-care deficits   - Assess range of motion  - Assess patient's mobility; develop plan if impaired  - Assess patient's need for assistive devices and provide as appropriate  - Encourage maximum independence but intervene and supervise when necessary  - Involve family in performance of ADLs  - Assess for home care needs following discharge   - Consider OT consult to assist with ADL evaluation and planning for discharge  - Provide patient education as appropriate  Outcome: Progressing  Goal: Maintains/Returns to pre admission functional level  Description: INTERVENTIONS:  - Perform BMAT or MOVE assessment daily    - Set and communicate daily mobility goal to care team and patient/family/caregiver  - Collaborate with rehabilitation services on mobility goals if consulted  - Perform Range of Motion 4 times a day  - Reposition patient every 2 hours    - Dangle patient 4 times a day  - Stand patient 4 times a day  - Ambulate patient 4 times a day  - Out of bed to chair 4 times a day   - Out of bed for meals 3 times a day  - Out of bed for toileting  - Record patient progress and toleration of activity level   Outcome: Progressing     Problem: PAIN - ADULT  Goal: Verbalizes/displays adequate comfort level or baseline comfort level  Description: Interventions:  - Encourage patient to monitor pain and request assistance  - Assess pain using appropriate pain scale  - Administer analgesics based on type and severity of pain and evaluate response  - Implement non-pharmacological measures as appropriate and evaluate response  - Consider cultural and social influences on pain and pain management  - Notify physician/advanced practitioner if interventions unsuccessful or patient reports new pain  Outcome: Progressing     Problem: INFECTION - ADULT  Goal: Absence or prevention of progression during hospitalization  Description: INTERVENTIONS:  - Assess and monitor for signs and symptoms of infection  - Monitor lab/diagnostic results  - Monitor all insertion sites, i e  indwelling lines, tubes, and drains  - Monitor endotracheal if appropriate and nasal secretions for changes in amount and color  - [place] appropriate cooling/warming therapies per order  - Administer medications as ordered  - Instruct and encourage patient and family to use good hand hygiene technique  - Identify and instruct in appropriate isolation precautions for identified infection/condition  Outcome: Progressing  Goal: Absence of fever/infection during neutropenic period  Description: INTERVENTIONS:  - Monitor WBC    Outcome: Progressing     Problem: SAFETY ADULT  Goal: Maintain or return to baseline ADL function  Description: INTERVENTIONS:  -  Assess patient's ability to carry out ADLs; assess patient's baseline for ADL function and identify physical deficits which impact ability to perform ADLs (bathing, care of mouth/teeth, toileting, grooming, dressing, etc )  - Assess/evaluate cause of self-care deficits   - Assess range of motion  - Assess patient's mobility; develop plan if impaired  - Assess patient's need for assistive devices and provide as appropriate  - Encourage maximum independence but intervene and supervise when necessary  - Involve family in performance of ADLs  - Assess for home care needs following discharge   - Consider OT consult to assist with ADL evaluation and planning for discharge  - Provide patient education as appropriate  Outcome: Progressing  Goal: Maintains/Returns to pre admission functional level  Description: INTERVENTIONS:  - Perform BMAT or MOVE assessment daily    - Set and communicate daily mobility goal to care team and patient/family/caregiver  - Collaborate with rehabilitation services on mobility goals if consulted  - Perform Range of Motion 4 times a day  - Reposition patient every 2 hours    - Dangle patient 4 times a day  - Stand patient 4 times a day  - Ambulate patient 4 times a day  - Out of bed to chair 4 times a day   - Out of bed for meals 3 times a day  - Out of bed for toileting  - Record patient progress and toleration of activity level   Outcome: Progressing  Goal: Patient will remain free of falls  Description: INTERVENTIONS:  - Educate patient/family on patient safety including physical limitations  - Instruct patient to call for assistance with activity   - Consult OT/PT to assist with strengthening/mobility   - Keep Call bell within reach  - Keep bed low and locked with side rails adjusted as appropriate  - Keep care items and personal belongings within reach  - Initiate and maintain comfort rounds  - Make Fall Risk Sign visible to staff  - Offer Toileting every 2 Hours, in advance of need  - Initiate/Maintain bed alarm  - Obtain necessary fall risk management equipment: alarm  - Apply yellow socks and bracelet for high fall risk patients  - Consider moving patient to room near nurses station  Outcome: Progressing

## 2021-07-18 NOTE — PROGRESS NOTES
[facility] [facility]  Progress Note - Mary Jane De Anda 1990, 31 y o  female MRN: [id number]  Unit/Bed#: [facility] 920-01 Encounter: [id number]  Primary Care Provider: No primary care provider on file  Date and time admitted to hospital: 7/11/2021  3:46 PM    * Low back pain  Assessment & Plan  · Patient presents to the ED with severe low back pain  Found to have b/l lower extremity cellulitis in the setting of IVDU and gram + bacteremia  · CT abdomen and pelvis negative for acute abdominal pathology  · Given ongoing intravenous drug use and bacteremia, diskitis/osteomyelitis/abscess needs to be considered  Refused MRI as she will not remove her nipple piercings and also patient does have retained needle in her neck from prior admission  CT recon  lumbar spine negative  · APS following, continue ATC APAP, lidocaine patch, gabapentin, Robaxin, p r n  Oxycodone 10/15 and intravenous Dilaudid for breakthrough  Septic embolism (HCC)  Assessment & Plan  · Small scattered peripheral ground-glass opacities within the bilateral lung bases possibly due to pneumonia versus septic emboli in the setting of intravenous drug use  Id following, also need to consider aspiration from loss of consciousness from intravenous drug intoxication  · COVID-19 PCR negative  · No respiratory complaints and oxygen saturation adequate on room air  Cellulitis of lower extremity  Assessment & Plan  · Bilateral great toe erythema/swelling and tenderness, this is improving  Now with right ankle abscess see below  · X-rays negative  · Intravenous antibiotics with vancomycin  · ID following  · Serial exams    Abscess  Assessment & Plan  · Right lateral ankle and left forearm  · General surgery attempted bedside however patient refused  Will premedicate with intravenous Valium  Now agreeable  General surgery made aware    · Now with new left ankle abscess forming however does not appear to be anything drainable--will monitor  MRSA bacteremia  Assessment & Plan  · 1 of 1 blood culture from admission growing MRSA  Repeat blood cultures negative at 48 hours  · Continue vancomycin    · Echo pending  · Will need 6 weeks IV abx from negative blood culture  PICC placed 7/16  Heroin abuse ([facility] [address] )  Assessment & Plan  · Patient admits to recent intravenous cocaine and fentanyl use  · Monitor closely for withdrawal   Continue clonidine 0 2 b i d  · CM following, refused HOST  Chronic anticoagulation  Assessment & Plan  · Continue Eliquis 5 mg twice daily  Bipolar 1 disorder (HCC)  Assessment & Plan  · Continue Abilify 10 mg daily  · Klonopin 0 5 mg twice daily  · Prazosin 1 mg q h s     History of endocarditis  Assessment & Plan  · 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  Patient completed six week course of antibiotics at this time  · Now with positive blood cultures again  Echo pending  No need for MELISSA as would not   VTE Pharmacologic Prophylaxis:   Moderate Risk (Score 3-4) - Pharmacological DVT Prophylaxis Ordered: apixaban (Eliquis)  Patient Centered Rounds: I performed bedside rounds with nursing staff today  Discussions with Specialists or Other Care Team Provider: nursing, general surgery    Education and Discussions with Family / Patient: Patient declined call to   Time Spent for Care: 20 minutes  More than 50% of total time spent on counseling and coordination of care as described above  Current Length of Stay: 6 day(s)  Current Patient Status: Inpatient   Certification Statement: The patient will continue to require additional inpatient hospital stay due to Intravenous antibiotics  Discharge Plan: Anticipate discharge in >72 hrs to home  Code Status: Level 1 - Full Code    Subjective:   Patient continues to complain of low back pain  Remains agreeable to I&D with Valium prior to procedure  Tearful today regarding drug use, she really would like to quit but does not feel that rehab would be beneficial because she has been there over 30 times  Objective:     Vitals:   Temp (24hrs), Av 6 °F (37 °C), Min:98 6 °F (37 °C), Max:98 6 °F (37 °C)    Temp:  [98 6 °F (37 °C)] 98 6 °F (37 °C)  Resp:  [17] 17  BP: (118)/(70) 118/70  Body mass index is 26 01 kg/m²  Input and Output Summary (last 24 hours): Intake/Output Summary (Last 24 hours) at 2021 0829  Last data filed at 2021 0256  Gross per 24 hour   Intake --   Output 0 ml   Net 0 ml       Physical Exam:   Physical Exam  Vitals and nursing note reviewed  Constitutional:       General: She is not in acute distress  Cardiovascular:      Rate and Rhythm: Normal rate  Pulmonary:      Breath sounds: Normal breath sounds  Abdominal:      Tenderness: There is no abdominal tenderness  Musculoskeletal:         General: No swelling  Skin:     General: Skin is warm  Comments: Right ankle abscess, left forearm abscess  Left ankle with forming abscess however nothing drainable at this time  Neurological:      Mental Status: She is alert and oriented to person, place, and time  Mental status is at baseline     Psychiatric:         Mood and Affect: Mood normal           Additional Data:     Labs:  Results from last 7 days   Lab Units 21  0530 21  0454   WBC Thousand/uL 7 25 8 46   HEMOGLOBIN g/dL 9 7* 9 4*   HEMATOCRIT % 31 2* 28 4*   PLATELETS Thousands/uL 339 265   BANDS PCT %  --  6   NEUTROS PCT % 45  --    LYMPHS PCT % 42  --    LYMPHO PCT %  --  14   MONOS PCT % 8  --    MONO PCT %  --  2*   EOS PCT % 4 0     Results from last 7 days   Lab Units 21  0530 21  0621   SODIUM mmol/L 140 140   POTASSIUM mmol/L 3 8 3 2*   CHLORIDE mmol/L 107 107   CO2 mmol/L 27 26   BUN mg/dL 6 4*   CREATININE mg/dL 0 48* 0 41*   ANION GAP mmol/L 6 7   CALCIUM mg/dL 8 2* 7 7*   ALBUMIN g/dL  --  2 2*   TOTAL BILIRUBIN mg/dL --  0 44   ALK PHOS U/L  --  114   ALT U/L  --  32   AST U/L  --  14   GLUCOSE RANDOM mg/dL 94 95     Results from last 7 days   Lab Units 07/12/21  0430   INR  1 17             Results from last 7 days   Lab Units 07/13/21  0621 07/12/21  0430 07/11/21  1805   LACTIC ACID mmol/L  --   --  1 2   PROCALCITONIN ng/ml 7 79* 0 77*  --        Lines/Drains:  Invasive Devices     Peripherally Inserted Central Catheter Line            PICC Line 25/52/02 Left Cephalic 1 day                Central Line:  Goal for removal: Will discontinue when meds requiring line are completed  Imaging: No pertinent imaging reviewed  Recent Cultures (last 7 days):   Results from last 7 days   Lab Units 07/13/21  0623 07/13/21  0622 07/12/21  0210 07/11/21  1805   BLOOD CULTURE  No Growth After 4 Days  No Growth After 4 Days    --  Methicillin Resistant Staphylococcus aureus*   GRAM STAIN RESULT   --   --   --  Gram positive cocci in clusters*   URINE CULTURE   --   --  40,000-49,000 cfu/ml   --        Last 24 Hours Medication List:   Current Facility-Administered Medications   Medication Dose Route Frequency Provider Last Rate    acetaminophen  975 mg Oral Q8H [facility] BOO Kay      aluminum-magnesium hydroxide-simethicone  30 mL Oral Q6H PRN Hazel Devries MD      apixaban  5 mg Oral BID Hazel Devries MD      ARIPiprazole  10 mg Oral Daily Hazel Devries MD      clonazePAM  0 5 mg Oral BID BOO Kay      cloNIDine  0 1 mg Oral Q12H [facility] BOO Kay      diazepam  2 5 mg Intravenous Once BOO George      docusate sodium  100 mg Oral BID PRN Hazel Devries MD      gabapentin  200 mg Oral BID BOO Kay      HYDROmorphone  0 5 mg Intravenous Q6H PRN BOO Kay      lidocaine (PF)  20 mL Infiltration Once Emy Baltazar MD      methocarbamol  750 mg Oral Q6H PRN Bella Nelson PA-C      nicotine  1 patch Transdermal Daily MD Richie Joel ondansetron  4 mg Intravenous Q6H PRN Curtis Villafana MD      oxyCODONE  10 mg Oral Q4H PRN BOO Perez      oxyCODONE  15 mg Oral Q4H PRN BOO Kay      polyethylene glycol  17 g Oral Daily BOO Kay      prazosin  1 mg Oral HS Curtis Villafana MD      vancomycin  1,750 mg Intravenous Q12H Mariposa Don MD 1,750 mg (07/17/21 2204)        Today, Patient Was Seen By: BOO Anderson    **Please Note: This note may have been constructed using a voice recognition system  **

## 2021-07-18 NOTE — ASSESSMENT & PLAN NOTE
· Bilateral great toe erythema/swelling and tenderness, this is improving  Now with right ankle abscess see below     · X-rays negative  · Intravenous antibiotics with vancomycin  · ID following  · Serial exams

## 2021-07-18 NOTE — ASSESSMENT & PLAN NOTE
· Patient presents to the ED with severe low back pain  Found to have b/l lower extremity cellulitis in the setting of IVDU and gram + bacteremia  · CT abdomen and pelvis negative for acute abdominal pathology  · Given ongoing intravenous drug use and bacteremia, diskitis/osteomyelitis/abscess needs to be considered  Refused MRI as she will not remove her nipple piercings and also patient does have retained needle in her neck from prior admission  CT recon  lumbar spine negative  · APS following, continue ATC APAP, lidocaine patch, gabapentin, Robaxin, p r n  Oxycodone 10/15 and intravenous Dilaudid for breakthrough

## 2021-07-18 NOTE — PROCEDURES
Incision and drain    Date/Time: 7/18/2021 5:13 PM  Performed by: Eleuterio García MD  Authorized by: Eleuterio García MD   Universal Protocol:  Consent: Verbal consent obtained  Written consent obtained  Risks and benefits: risks, benefits and alternatives were discussed  Consent given by: patient  Time out: Immediately prior to procedure a "time out" was called to verify the correct patient, procedure, equipment, support staff and site/side marked as required  Timeout called at: 7/18/2021 4:00 PM     Patient location:  Bedside  Location:     Type:  Abscess    Size:  3x4 cm    Location:  Upper extremity    Upper extremity location:  L arm  Pre-procedure details:     Skin preparation:  Chloraprep  Sedation:     Sedation type: Anxiolysis  Anesthesia (see MAR for exact dosages): Anesthesia method:  Local infiltration    Local anesthetic:  Lidocaine 1% WITH epi  Procedure details:     Complexity:  Simple    Needle aspiration: no      Incision types:  Elliptical    Scalpel blade:  11    Incision depth:  Subcutaneous    Wound management:  Probed and deloculated    Drainage:  Bloody and purulent    Drainage amount: Moderate    Wound treatment:  Wound left open and packing placed    Packing material: 1/2 inch packing  Post-procedure details:     Patient tolerance of procedure: Tolerated well, no immediate complications  Comments:      Attempted drainage of RLE abscess  Patient became anxious- demanding more and more medication prior to procedure  Demanded medication she was given prior (versed) after doses of diazepam and dilaudid  Patient ultimately refused to have procedure done for RLE  Threatening to leave AMA  Remainder of procedure aborted      Cultures obtained from L arm

## 2021-07-18 NOTE — NURSING NOTE
Levar Hughes 63 to given patient 1800 medication and she stated that she wanted her picc line out & that she wanted to sign out AMA  When going to get paper work for [facility] found patient in coffman holding piccline  Patient said that she was pulling the tape off and it just fell out  Patient was getting upset when  She was told that she would have to wait tell the doctor came to explain to her the risks of leaving AMA  Dr Brandi Jesus came to bedside see his note   Patient just walked out then,

## 2021-07-18 NOTE — PROGRESS NOTES
Progress Note - Infectious Disease   Little Roman 31 y o  female MRN: [id number]  Unit/Bed#: [facility] 920-01 Encounter: [id number]      Impression/Recommendations:  1   MRSA bacteremia  Lethaubaldo Rodriguez a single blood culture was drawn at admission  Marivel Florinda, it is difficult to be certain whether this is true bacteremia versus contaminated blood draw   Repeat blood cultures have no growth thus far but patient had been on vancomycin prior to repeat blood cultures  Troy Douglas active IVDU, history of TV endocarditis and status post TV repair with annuloplasty, will have to treat this is true bacteremia  Continue IV vancomycin  Follow-up on repeat blood cultures for clearance of bacteremia  Follow-up 2D echo  Treat x6 weeks total     As long as repeat blood cultures have no growth, will not pursue MELISSA since it will not change antibiotic plan      2  Bilateral feet cellulitis, now with right ankle subcutaneous abscess, with evolving left forearm abscess,  most likely secondary to active IVDU  Foot cellulitis is improved but right ankle and left forearm abscess is will need local I&D  Unfortunately, patient refused I&D   Patient remains systemically well, without clinical signs of sepsis or systemic toxicity    Antibiotic plan as in above  Serial feet exams  Monitor temperature/WBC  Recommend local I&D, whenever patient is agreeable      3  Low back pain   Exam is relatively benign   No neurological deficit   Lumbar spine CT without evidence of vertebral osteomyelitis/diskitis or paraspinal infection  Monitor low back pain for now  Pain control per primary service      4  Abnormal lower lobes of lungs on abdomen/pelvis CT   CT showed small foci of ground-glass opacities, without cavitation or consolidation   This may be all secondary to aspiration from loss of consciousness from IV drug intoxication   Patient has no respiratory symptoms   She has no hypoxia  No antibiotic needed for this for now    Monitor respiratory symptoms  Monitor O2 saturation      5  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   Patient is status post TV repair with annuloplasty   Concern for endocarditis, as in above  Antibiotic plan as in above      6  Active IVDU, with noncompliance to care and with multiple episodes of leaving AMA   Patient is not a candidate for home IV antibiotic      Discussed with patient in detail regarding the above plan  Discussed with slim service      Antibiotics:  Vancomycin  Antibiotic # 5      Subjective:  Patient refused I&D yesterday  She complains of worse pain in her right ankle     Low back pain about the same  Temperature stays down  No chills  She is tolerating antibiotic well  No nausea, vomiting or diarrhea  Objective:  Vitals:  Temp:  [98 5 °F (36 9 °C)-98 8 °F (37 1 °C)] 98 8 °F (37 1 °C)  Resp:  [17-18] 18  BP: (113-115)/(64-68) 113/64  Temp (24hrs), Av 7 °F (37 1 °C), Min:98 5 °F (36 9 °C), Max:98 8 °F (37 1 °C)  Current: Temperature: 98 8 °F (37 1 °C)    Physical Exam:     General: Awake, alert, cooperative, no distress  Neck:  Supple  No mass  No lymphadenopathy  Lungs: Expansion symmetric, no rales, no wheezing, respirations unlabored  Heart:  Regular rate and rhythm, S1 and S2 normal, no murmur  Abdomen: Soft, nondistended, non-tender, bowel sounds active all four quadrants, no masses, no organomegaly  Extremities: Improving foot erythema  Worsening right ankle fluctuance  No drainage at present  Worsening left forearm fluctuance  Skin:  No rash  Neuro: Moves all extremities  Invasive Devices     Peripherally Inserted Central Catheter Line            PICC Line 71/56/88 Left Cephalic 1 day                Labs studies:   I have personally reviewed pertinent labs    Results from last 7 days   Lab Units 21  0530 21  0454 21  0621 21  1805   POTASSIUM mmol/L 3 8 3 1* 3 2* 3 0*   CHLORIDE mmol/L 107 106 107 100   CO2 mmol/L 27 23 26 27   BUN mg/dL 6 8 4* 10   CREATININE mg/dL 0 48* 0 78 0 41* 0 52*   EGFR ml/min/1 73sq m 134 104 141 130   CALCIUM mg/dL 8 2* 7 5* 7 7* 8 5   AST U/L  --   --  14 42   ALT U/L  --   --  32 56   ALK PHOS U/L  --   --  114 110     Results from last 7 days   Lab Units 07/17/21  0530 07/14/21  0454 07/13/21  0621   WBC Thousand/uL 7 25 8 46 10 34*   HEMOGLOBIN g/dL 9 7* 9 4* 10 1*   PLATELETS Thousands/uL 339 265 247     Results from last 7 days   Lab Units 07/13/21  0623 07/13/21  0622 07/12/21  0210 07/11/21  1805   BLOOD CULTURE  No Growth After 4 Days  No Growth After 4 Days  --  Methicillin Resistant Staphylococcus aureus*   GRAM STAIN RESULT   --   --   --  Gram positive cocci in clusters*   URINE CULTURE   --   --  40,000-49,000 cfu/ml   --        Imaging Studies:   I have personally reviewed pertinent imaging study reports and images in PACS  EKG, Pathology, and Other Studies:   I have personally reviewed pertinent reports

## 2021-07-18 NOTE — PROGRESS NOTES
Vancomycin IV Pharmacy-to-Dose Consultation    Lyndsey Rueda is a 31 y o  female who is currently receiving Vancomycin IV with management by the Pharmacy Consult service  Assessment/Plan:  The patient was reviewed  Renal function is stable and no signs or symptoms of nephrotoxicity and/or infusion reactions were documented in the chart  Based on todays assessment, continue current vancomycin (day # 6) dosing of 1750mg IV Q12h, with a plan for trough to be drawn at 0700 on 7/19  Patient was switched from 1250mg IV Q8h dosing to 1750mg IV Q12h dosing on 7/17-> at that time patient had a supra-therapeutic trough  Patient's creatinine clearance is stable, but still >100mL/min  Patient's dose may need to be adjusted further following trough on 7/19  We will continue to follow the patients culture results and clinical progress daily      Janny Salgado, Pharmacist

## 2021-07-18 NOTE — ASSESSMENT & PLAN NOTE
· Right lateral ankle and left forearm  · General surgery attempted bedside however patient refused  Will premedicate with intravenous Valium  Now agreeable  General surgery made aware  · Now with new left ankle abscess forming however does not appear to be anything drainable--will monitor

## 2021-07-18 NOTE — PROGRESS NOTES
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, will have to treat this is true bacteremia  Continue IV vancomycin  Follow-up 2D echo  Treat x6 weeks total     As long as repeat blood cultures have no growth, will not pursue MELISSA since it will not change antibiotic plan      2  Bilateral feet cellulitis, now with right ankle subcutaneous abscess, with evolving left forearm abscess,  most likely secondary to active IVDU   Foot cellulitis is improved but right ankle and left forearm abscess is will need local I&D  Unfortunately, patient refused I&D   Patient remains systemically well, without clinical signs of sepsis or systemic toxicity    Antibiotic plan as in above  Serial feet exams  Monitor temperature/WBC  Recommend local I&D, whenever patient is agreeable      3  Low back pain   Exam is relatively benign   No neurological deficit   Lumbar spine CT without evidence of vertebral osteomyelitis/diskitis or paraspinal infection  Monitor low back pain for now  Pain control per primary service      4  Abnormal lower lobes of lungs on abdomen/pelvis CT   CT showed small foci of ground-glass opacities, without cavitation or consolidation   This may be all secondary to aspiration from loss of consciousness from IV drug intoxication   Patient has no respiratory symptoms   She has no hypoxia  No antibiotic needed for this for now  Monitor respiratory symptoms    Monitor O2 saturation      5  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  Yisel Prasad is status post TV repair with annuloplasty   Concern for endocarditis, as in above  Antibiotic plan as in above      6  Active IVDU, with noncompliance to care and with multiple episodes of leaving AMA   Patient is not a candidate for home IV antibiotic      Discussed with patient in detail regarding the above plan  Discussed with slim service earlier      Antibiotics:  Vancomycin  Antibiotic # 6      Subjective:  Patient with unchanged low back pain  She complains of worse pain in her right ankle     Temperature stays down  No chills  She is tolerating antibiotic well  No nausea, vomiting or diarrhea      Objective:  Vitals:  Temp:  [97 8 °F (36 6 °C)-98 6 °F (37 °C)] 97 8 °F (36 6 °C)  Resp:  [17] 17  BP: ()/(50-70) 107/50  Temp (24hrs), Av 2 °F (36 8 °C), Min:97 8 °F (36 6 °C), Max:98 6 °F (37 °C)  Current: Temperature: 97 8 °F (36 6 °C)    Physical Exam:     General: Awake, alert, cooperative, no distress  Neck:  Supple  No mass  No lymphadenopathy  Lungs: Expansion symmetric, no rales, no wheezing, respirations unlabored  Heart:  Regular rate and rhythm, S1 and S2 normal, no murmur  Abdomen: Soft, nondistended, non-tender, bowel sounds active all four quadrants, no masses, no organomegaly  Extremities: No edema  Worsening fluctuance in right lateral ankle and left forearm  No drainage  Increased tenderness  Skin:  No rash  Neuro: Moves all extremities  Invasive Devices     Peripherally Inserted Central Catheter Line            PICC Line  Left Cephalic 2 days                Labs studies:   I have personally reviewed pertinent labs    Results from last 7 days   Lab Units 21  0530 21  0454 21  0621 21  1805   POTASSIUM mmol/L 3 8 3 1* 3 2* 3 0*   CHLORIDE mmol/L 107 106 107 100   CO2 mmol/L 27 23 26 27   BUN mg/dL 6 8 4* 10   CREATININE mg/dL 0 48* 0 78 0 41* 0 52*   EGFR ml/min/1 73sq m 134 104 141 130 CALCIUM mg/dL 8 2* 7 5* 7 7* 8 5   AST U/L  --   --  14 42   ALT U/L  --   --  32 56   ALK PHOS U/L  --   --  114 110     Results from last 7 days   Lab Units 07/17/21  0530 07/14/21  0454 07/13/21  0621   WBC Thousand/uL 7 25 8 46 10 34*   HEMOGLOBIN g/dL 9 7* 9 4* 10 1*   PLATELETS Thousands/uL 339 265 247     Results from last 7 days   Lab Units 07/13/21  0623 07/13/21  0622 07/12/21  0210 07/11/21  1805   BLOOD CULTURE  No Growth After 5 Days  No Growth After 5 Days  --  Methicillin Resistant Staphylococcus aureus*   GRAM STAIN RESULT   --   --   --  Gram positive cocci in clusters*   URINE CULTURE   --   --  40,000-49,000 cfu/ml   --        Imaging Studies:   I have personally reviewed pertinent imaging study reports and images in PACS  EKG, Pathology, and Other Studies:   I have personally reviewed pertinent reports

## 2021-07-19 LAB
ALBUMIN SERPL BCP-MCNC: 3.1 G/DL (ref 3.5–5)
ALP SERPL-CCNC: 118 U/L (ref 46–116)
ALT SERPL W P-5'-P-CCNC: 16 U/L (ref 12–78)
ANION GAP SERPL CALCULATED.3IONS-SCNC: 13 MMOL/L (ref 4–13)
AST SERPL W P-5'-P-CCNC: 35 U/L (ref 5–45)
BASOPHILS # BLD AUTO: 0.06 THOUSANDS/ΜL (ref 0–0.1)
BASOPHILS NFR BLD AUTO: 1 % (ref 0–1)
BILIRUB SERPL-MCNC: 0.25 MG/DL (ref 0.2–1)
BUN SERPL-MCNC: 13 MG/DL (ref 5–25)
CALCIUM ALBUM COR SERPL-MCNC: 10.3 MG/DL (ref 8.3–10.1)
CALCIUM SERPL-MCNC: 9.6 MG/DL (ref 8.3–10.1)
CHLORIDE SERPL-SCNC: 99 MMOL/L (ref 100–108)
CO2 SERPL-SCNC: 22 MMOL/L (ref 21–32)
CREAT SERPL-MCNC: 1.17 MG/DL (ref 0.6–1.3)
EOSINOPHIL # BLD AUTO: 0.28 THOUSAND/ΜL (ref 0–0.61)
EOSINOPHIL NFR BLD AUTO: 3 % (ref 0–6)
ERYTHROCYTE [DISTWIDTH] IN BLOOD BY AUTOMATED COUNT: 12.7 % (ref 11.6–15.1)
GFR SERPL CREATININE-BSD FRML MDRD: 64 ML/MIN/1.73SQ M
GLUCOSE SERPL-MCNC: 79 MG/DL (ref 65–140)
HCT VFR BLD AUTO: 39.9 % (ref 34.8–46.1)
HGB BLD-MCNC: 12.2 G/DL (ref 11.5–15.4)
IMM GRANULOCYTES # BLD AUTO: 0.12 THOUSAND/UL (ref 0–0.2)
IMM GRANULOCYTES NFR BLD AUTO: 1 % (ref 0–2)
LYMPHOCYTES # BLD AUTO: 3.21 THOUSANDS/ΜL (ref 0.6–4.47)
LYMPHOCYTES NFR BLD AUTO: 33 % (ref 14–44)
MCH RBC QN AUTO: 28.6 PG (ref 26.8–34.3)
MCHC RBC AUTO-ENTMCNC: 30.6 G/DL (ref 31.4–37.4)
MCV RBC AUTO: 94 FL (ref 82–98)
MONOCYTES # BLD AUTO: 0.77 THOUSAND/ΜL (ref 0.17–1.22)
MONOCYTES NFR BLD AUTO: 8 % (ref 4–12)
NEUTROPHILS # BLD AUTO: 5.33 THOUSANDS/ΜL (ref 1.85–7.62)
NEUTS SEG NFR BLD AUTO: 54 % (ref 43–75)
NRBC BLD AUTO-RTO: 0 /100 WBCS
PLATELET # BLD AUTO: 471 THOUSANDS/UL (ref 149–390)
PMV BLD AUTO: 8.3 FL (ref 8.9–12.7)
POTASSIUM SERPL-SCNC: 3.8 MMOL/L (ref 3.5–5.3)
PROT SERPL-MCNC: 9.3 G/DL (ref 6.4–8.2)
RBC # BLD AUTO: 4.26 MILLION/UL (ref 3.81–5.12)
SODIUM SERPL-SCNC: 134 MMOL/L (ref 136–145)
WBC # BLD AUTO: 9.77 THOUSAND/UL (ref 4.31–10.16)

## 2021-07-19 PROCEDURE — 99285 EMERGENCY DEPT VISIT HI MDM: CPT | Performed by: EMERGENCY MEDICINE

## 2021-07-19 PROCEDURE — 36415 COLL VENOUS BLD VENIPUNCTURE: CPT

## 2021-07-19 PROCEDURE — 80053 COMPREHEN METABOLIC PANEL: CPT | Performed by: EMERGENCY MEDICINE

## 2021-07-19 PROCEDURE — 87040 BLOOD CULTURE FOR BACTERIA: CPT | Performed by: EMERGENCY MEDICINE

## 2021-07-19 PROCEDURE — 99284 EMERGENCY DEPT VISIT MOD MDM: CPT

## 2021-07-19 PROCEDURE — 85025 COMPLETE CBC W/AUTO DIFF WBC: CPT | Performed by: EMERGENCY MEDICINE

## 2021-07-19 NOTE — DISCHARGE SUMMARY
Discharge Summary - [address] [facility]    Patient Information: Giovany Lara 31 y o  female MRN: [id number]  Unit/Bed#: [facility] 920-01 Encounter: [id number]    Discharging Physician / Practitioner: BOO Cross  PCP: No primary care provider on file  Admission Date: 7/11/2021  Discharge Date: 07/19/21     The patient left against medical advice  Reason for Admission: MRSA bacteremia, lower extremity cellulitis     Discharge Diagnoses:     Principal Problem:    Low back pain  Active Problems:    Septic embolism (HCC)    Cellulitis of lower extremity    Heroin abuse (HCC)    MRSA bacteremia    Abscess    Chronic anticoagulation    Bipolar 1 disorder (HCC)    History of endocarditis  Resolved Problems:    * No resolved hospital problems  *      Consultations During Hospital Stay:  ID  Gen surg  Case management     Procedures Performed:     · Bedside I and D of LUE abscess     Significant Findings / Test Results:     · CT abdomen pelvis with contrast Small scattered peripheral groundglass opacities within bilateral lung bases may be due to pneumonia, septic emboli in the setting of IV drug use, or Covid-19 infection  · X-ray left and right foot negative for acute osseous abnormality  · CT recon lumbar spine No CT findings consistent with discitis osteomyelitis  No paraspinal soft tissue pathology  Chronic left L5 spondylolysis without spondylolisthesis  No fracture or traumatic subluxation  Groundglass opacities at the lung base as described on concurrent CT of the abdomen  · Initial blood culture positive MRSA, repeat negative at 5 days  · COVID-19 PCR negative    Incidental Findings:   · None    Test Results Pending at Discharge (will require follow up):    · None     Outpatient Tests Requested:  · Return to ED ASAP for continued intravenous antibiotics or Outpatient follow-up with PCP asap    Complications:  Patient left [facility] Hospital Course:     Giovany Lara is a 31 y o  female patient with past medical history of polysubstance abuse, TV endocarditis status post TV repair in September 2020, bipolar disorder, chronic anticoagulation who originally presented to the hospital on 7/11/2021 due to low back pain found to have blood culture positive for MRSA and lower extremity cellulitis  Patient was started on intravenous antibiotics  Id was consulted  Patient had several abscesses which were attempted to be drained at bedside by General surgery  Patient ultimately refused remainder of I&D and decided to leave against medical advice  Condition at Discharge: poor     Discharge Day Visit / Exam:     * Please refer to separate progress note for these details *    Discussion with Family:  Patient      Discharge instructions/Information to patient and family:   See after visit summary for information provided to patient and family  Provisions for Follow-Up Care:  See after visit summary for information related to follow-up care and any pertinent home health orders  Disposition:     Home    For Discharges to [facility] SNF:   · Not Applicable to this Patient - Not Applicable to this Patient    Planned Readmission: no    Discharge Statement:  I spent 40 minutes discharging the patient  This time was spent on the day of discharge  I had direct contact with the patient on the day of discharge  Greater than 50% of the total time was spent examining patient, answering all patient questions, arranging and discussing plan of care with patient as well as directly providing post-discharge instructions  Additional time then spent on discharge activities  Discharge Medications:  See after visit summary for reconciled discharge medications provided to patient and family  ** Please Note: This note has been constructed using a voice recognition system   **

## 2021-07-20 ENCOUNTER — HOSPITAL ENCOUNTER (INPATIENT)
Facility: HOSPITAL | Age: 31
LOS: 3 days | Discharge: LEFT BEFORE DISCHARGE | DRG: 720 | End: 2021-07-23
Attending: EMERGENCY MEDICINE | Admitting: INTERNAL MEDICINE
Payer: COMMERCIAL

## 2021-07-20 ENCOUNTER — APPOINTMENT (EMERGENCY)
Dept: RADIOLOGY | Facility: HOSPITAL | Age: 31
DRG: 720 | End: 2021-07-20
Payer: COMMERCIAL

## 2021-07-20 ENCOUNTER — HOSPITAL ENCOUNTER (EMERGENCY)
Facility: HOSPITAL | Age: 31
Discharge: HOME | End: 2021-07-20
Attending: EMERGENCY MEDICINE | Admitting: EMERGENCY MEDICINE
Payer: COMMERCIAL

## 2021-07-20 ENCOUNTER — TELEPHONE (OUTPATIENT)
Dept: RADIOLOGY | Facility: HOSPITAL | Age: 31
End: 2021-07-20

## 2021-07-20 ENCOUNTER — APPOINTMENT (OUTPATIENT)
Dept: INTERVENTIONAL RADIOLOGY/VASCULAR | Facility: HOSPITAL | Age: 31
DRG: 720 | End: 2021-07-20
Attending: EMERGENCY MEDICINE
Payer: COMMERCIAL

## 2021-07-20 ENCOUNTER — APPOINTMENT (EMERGENCY)
Dept: RADIOLOGY | Facility: HOSPITAL | Age: 31
End: 2021-07-20
Payer: COMMERCIAL

## 2021-07-20 VITALS
OXYGEN SATURATION: 100 % | DIASTOLIC BLOOD PRESSURE: 59 MMHG | RESPIRATION RATE: 16 BRPM | BODY MASS INDEX: 23.56 KG/M2 | SYSTOLIC BLOOD PRESSURE: 109 MMHG | HEART RATE: 54 BPM | WEIGHT: 133 LBS | TEMPERATURE: 97.8 F

## 2021-07-20 DIAGNOSIS — R78.81 BACTEREMIA DUE TO METHICILLIN RESISTANT STAPHYLOCOCCUS AUREUS: ICD-10-CM

## 2021-07-20 DIAGNOSIS — L02.415 ABSCESS OF SKIN OF RIGHT ANKLE: ICD-10-CM

## 2021-07-20 DIAGNOSIS — L03.115 CELLULITIS OF RIGHT LOWER EXTREMITY: ICD-10-CM

## 2021-07-20 DIAGNOSIS — Z86.79 HISTORY OF ENDOCARDITIS: ICD-10-CM

## 2021-07-20 DIAGNOSIS — R78.81 MRSA BACTEREMIA: ICD-10-CM

## 2021-07-20 DIAGNOSIS — L03.116 CELLULITIS OF LEFT LOWER EXTREMITY: ICD-10-CM

## 2021-07-20 DIAGNOSIS — B95.62 BACTEREMIA DUE TO METHICILLIN RESISTANT STAPHYLOCOCCUS AUREUS: ICD-10-CM

## 2021-07-20 DIAGNOSIS — B95.62 MRSA BACTEREMIA: ICD-10-CM

## 2021-07-20 DIAGNOSIS — A41.9 SEPSIS (HCC): Primary | ICD-10-CM

## 2021-07-20 DIAGNOSIS — L02.91 ABSCESS: ICD-10-CM

## 2021-07-20 DIAGNOSIS — R78.81 BACTEREMIA: Primary | ICD-10-CM

## 2021-07-20 PROBLEM — R52 GENERALIZED PAIN: Status: ACTIVE | Noted: 2021-07-12

## 2021-07-20 LAB
ALBUMIN SERPL BCP-MCNC: 3.1 G/DL (ref 3.5–5)
ALP SERPL-CCNC: 111 U/L (ref 46–116)
ALT SERPL W P-5'-P-CCNC: 19 U/L (ref 12–78)
ANION GAP SERPL CALCULATED.3IONS-SCNC: 10 MMOL/L (ref 4–13)
APTT PPP: 31 SECONDS (ref 23–37)
AST SERPL W P-5'-P-CCNC: 36 U/L (ref 5–45)
BACTERIA SPEC ANAEROBE CULT: NORMAL
BASOPHILS # BLD AUTO: 0.04 THOUSANDS/ΜL (ref 0–0.1)
BASOPHILS NFR BLD AUTO: 0 % (ref 0–1)
BILIRUB SERPL-MCNC: 0.2 MG/DL (ref 0.2–1)
BUN SERPL-MCNC: 17 MG/DL (ref 5–25)
CALCIUM ALBUM COR SERPL-MCNC: 9.9 MG/DL (ref 8.3–10.1)
CALCIUM SERPL-MCNC: 9.2 MG/DL (ref 8.3–10.1)
CHLORIDE SERPL-SCNC: 100 MMOL/L (ref 100–108)
CK MB SERPL-MCNC: 24.3 NG/ML (ref 0–5)
CK MB SERPL-MCNC: 4.4 % (ref 0–2.5)
CK SERPL-CCNC: 558 U/L (ref 26–192)
CO2 SERPL-SCNC: 29 MMOL/L (ref 21–32)
CREAT SERPL-MCNC: 0.98 MG/DL (ref 0.6–1.3)
EOSINOPHIL # BLD AUTO: 0.18 THOUSAND/ΜL (ref 0–0.61)
EOSINOPHIL NFR BLD AUTO: 2 % (ref 0–6)
ERYTHROCYTE [DISTWIDTH] IN BLOOD BY AUTOMATED COUNT: 12.2 % (ref 11.6–15.1)
GFR SERPL CREATININE-BSD FRML MDRD: 79 ML/MIN/1.73SQ M
GLUCOSE SERPL-MCNC: 99 MG/DL (ref 65–140)
HCT VFR BLD AUTO: 38.1 % (ref 34.8–46.1)
HGB BLD-MCNC: 12.2 G/DL (ref 11.5–15.4)
IMM GRANULOCYTES # BLD AUTO: 0.09 THOUSAND/UL (ref 0–0.2)
IMM GRANULOCYTES NFR BLD AUTO: 1 % (ref 0–2)
INR PPP: 1.09 (ref 0.84–1.19)
LACTATE SERPL-SCNC: 1.7 MMOL/L (ref 0.5–2)
LYMPHOCYTES # BLD AUTO: 2.64 THOUSANDS/ΜL (ref 0.6–4.47)
LYMPHOCYTES NFR BLD AUTO: 22 % (ref 14–44)
MCH RBC QN AUTO: 28.3 PG (ref 26.8–34.3)
MCHC RBC AUTO-ENTMCNC: 32 G/DL (ref 31.4–37.4)
MCV RBC AUTO: 88 FL (ref 82–98)
MONOCYTES # BLD AUTO: 0.81 THOUSAND/ΜL (ref 0.17–1.22)
MONOCYTES NFR BLD AUTO: 7 % (ref 4–12)
NEUTROPHILS # BLD AUTO: 8.37 THOUSANDS/ΜL (ref 1.85–7.62)
NEUTS SEG NFR BLD AUTO: 68 % (ref 43–75)
NRBC BLD AUTO-RTO: 0 /100 WBCS
PLATELET # BLD AUTO: 687 THOUSANDS/UL (ref 149–390)
PMV BLD AUTO: 8.9 FL (ref 8.9–12.7)
POTASSIUM SERPL-SCNC: 4.3 MMOL/L (ref 3.5–5.3)
PROCALCITONIN SERPL-MCNC: <0.05 NG/ML
PROT SERPL-MCNC: 8.4 G/DL (ref 6.4–8.2)
PROTHROMBIN TIME: 14.1 SECONDS (ref 11.6–14.5)
RBC # BLD AUTO: 4.31 MILLION/UL (ref 3.81–5.12)
SARS-COV-2 RNA RESP QL NAA+PROBE: NEGATIVE
SODIUM SERPL-SCNC: 139 MMOL/L (ref 136–145)
TROPONIN I SERPL-MCNC: <0.02 NG/ML
WBC # BLD AUTO: 12.13 THOUSAND/UL (ref 4.31–10.16)

## 2021-07-20 PROCEDURE — 87040 BLOOD CULTURE FOR BACTERIA: CPT

## 2021-07-20 PROCEDURE — 82553 CREATINE MB FRACTION: CPT | Performed by: EMERGENCY MEDICINE

## 2021-07-20 PROCEDURE — 02HV33Z INSERTION OF INFUSION DEVICE INTO SUPERIOR VENA CAVA, PERCUTANEOUS APPROACH: ICD-10-PCS | Performed by: EMERGENCY MEDICINE

## 2021-07-20 PROCEDURE — 85730 THROMBOPLASTIN TIME PARTIAL: CPT

## 2021-07-20 PROCEDURE — 85610 PROTHROMBIN TIME: CPT

## 2021-07-20 PROCEDURE — 85025 COMPLETE CBC W/AUTO DIFF WBC: CPT

## 2021-07-20 PROCEDURE — 84484 ASSAY OF TROPONIN QUANT: CPT

## 2021-07-20 PROCEDURE — 96375 TX/PRO/DX INJ NEW DRUG ADDON: CPT

## 2021-07-20 PROCEDURE — B5181ZA FLUOROSCOPY OF SUPERIOR VENA CAVA USING LOW OSMOLAR CONTRAST, GUIDANCE: ICD-10-PCS | Performed by: INTERNAL MEDICINE

## 2021-07-20 PROCEDURE — 87040 BLOOD CULTURE FOR BACTERIA: CPT | Performed by: EMERGENCY MEDICINE

## 2021-07-20 PROCEDURE — 84145 PROCALCITONIN (PCT): CPT

## 2021-07-20 PROCEDURE — 71045 X-RAY EXAM CHEST 1 VIEW: CPT

## 2021-07-20 PROCEDURE — 76937 US GUIDE VASCULAR ACCESS: CPT

## 2021-07-20 PROCEDURE — 36573 INSJ PICC RS&I 5 YR+: CPT

## 2021-07-20 PROCEDURE — 77001 FLUOROGUIDE FOR VEIN DEVICE: CPT

## 2021-07-20 PROCEDURE — 36415 COLL VENOUS BLD VENIPUNCTURE: CPT | Performed by: EMERGENCY MEDICINE

## 2021-07-20 PROCEDURE — 83605 ASSAY OF LACTIC ACID: CPT

## 2021-07-20 PROCEDURE — 99285 EMERGENCY DEPT VISIT HI MDM: CPT | Performed by: EMERGENCY MEDICINE

## 2021-07-20 PROCEDURE — U0003 INFECTIOUS AGENT DETECTION BY NUCLEIC ACID (DNA OR RNA); SEVERE ACUTE RESPIRATORY SYNDROME CORONAVIRUS 2 (SARS-COV-2) (CORONAVIRUS DISEASE [COVID-19]), AMPLIFIED PROBE TECHNIQUE, MAKING USE OF HIGH THROUGHPUT TECHNOLOGIES AS DESCRIBED BY CMS-2020-01-R: HCPCS | Performed by: INTERNAL MEDICINE

## 2021-07-20 PROCEDURE — 99223 1ST HOSP IP/OBS HIGH 75: CPT | Performed by: INTERNAL MEDICINE

## 2021-07-20 PROCEDURE — 99285 EMERGENCY DEPT VISIT HI MDM: CPT

## 2021-07-20 PROCEDURE — 93005 ELECTROCARDIOGRAM TRACING: CPT

## 2021-07-20 PROCEDURE — C1751 CATH, INF, PER/CENT/MIDLINE: HCPCS

## 2021-07-20 PROCEDURE — U0005 INFEC AGEN DETEC AMPLI PROBE: HCPCS | Performed by: INTERNAL MEDICINE

## 2021-07-20 PROCEDURE — 96365 THER/PROPH/DIAG IV INF INIT: CPT

## 2021-07-20 PROCEDURE — 80053 COMPREHEN METABOLIC PANEL: CPT

## 2021-07-20 PROCEDURE — 96367 TX/PROPH/DG ADDL SEQ IV INF: CPT

## 2021-07-20 PROCEDURE — 82550 ASSAY OF CK (CPK): CPT | Performed by: EMERGENCY MEDICINE

## 2021-07-20 RX ORDER — HYDROXYZINE HYDROCHLORIDE 25 MG/1
25 TABLET, FILM COATED ORAL EVERY 6 HOURS PRN
Status: DISCONTINUED | OUTPATIENT
Start: 2021-07-20 | End: 2021-07-23 | Stop reason: HOSPADM

## 2021-07-20 RX ORDER — LIDOCAINE 50 MG/G
1 PATCH TOPICAL ONCE
Status: DISCONTINUED | OUTPATIENT
Start: 2021-07-20 | End: 2021-07-20 | Stop reason: HOSPADM

## 2021-07-20 RX ORDER — CLONAZEPAM 0.5 MG/1
0.5 TABLET ORAL 2 TIMES DAILY
Status: DISCONTINUED | OUTPATIENT
Start: 2021-07-20 | End: 2021-07-23 | Stop reason: HOSPADM

## 2021-07-20 RX ORDER — METHOCARBAMOL 500 MG/1
750 TABLET, FILM COATED ORAL EVERY 6 HOURS PRN
Status: DISCONTINUED | OUTPATIENT
Start: 2021-07-20 | End: 2021-07-23 | Stop reason: HOSPADM

## 2021-07-20 RX ORDER — OXYCODONE HYDROCHLORIDE 5 MG/1
10 TABLET ORAL EVERY 6 HOURS PRN
Status: DISCONTINUED | OUTPATIENT
Start: 2021-07-20 | End: 2021-07-20

## 2021-07-20 RX ORDER — ACETAMINOPHEN 325 MG/1
650 TABLET ORAL EVERY 6 HOURS PRN
Status: DISCONTINUED | OUTPATIENT
Start: 2021-07-20 | End: 2021-07-20

## 2021-07-20 RX ORDER — GABAPENTIN 300 MG/1
300 CAPSULE ORAL 2 TIMES DAILY
Status: DISCONTINUED | OUTPATIENT
Start: 2021-07-20 | End: 2021-07-23 | Stop reason: HOSPADM

## 2021-07-20 RX ORDER — HYDROMORPHONE HCL/PF 1 MG/ML
0.5 SYRINGE (ML) INJECTION EVERY 4 HOURS PRN
Status: DISCONTINUED | OUTPATIENT
Start: 2021-07-20 | End: 2021-07-23 | Stop reason: HOSPADM

## 2021-07-20 RX ORDER — ACETAMINOPHEN 325 MG/1
975 TABLET ORAL EVERY 8 HOURS
Status: DISCONTINUED | OUTPATIENT
Start: 2021-07-20 | End: 2021-07-23 | Stop reason: HOSPADM

## 2021-07-20 RX ORDER — ARIPIPRAZOLE 5 MG/1
10 TABLET ORAL DAILY
Status: DISCONTINUED | OUTPATIENT
Start: 2021-07-21 | End: 2021-07-23 | Stop reason: HOSPADM

## 2021-07-20 RX ORDER — QUETIAPINE FUMARATE 50 MG/1
50 TABLET, FILM COATED ORAL
COMMUNITY

## 2021-07-20 RX ORDER — METHOCARBAMOL 500 MG/1
500 TABLET, FILM COATED ORAL ONCE
Status: DISCONTINUED | OUTPATIENT
Start: 2021-07-20 | End: 2021-07-20 | Stop reason: HOSPADM

## 2021-07-20 RX ORDER — CEFEPIME HYDROCHLORIDE 2 G/50ML
2000 INJECTION, SOLUTION INTRAVENOUS EVERY 12 HOURS
Status: DISCONTINUED | OUTPATIENT
Start: 2021-07-21 | End: 2021-07-20

## 2021-07-20 RX ORDER — CEFEPIME HYDROCHLORIDE 2 G/50ML
2000 INJECTION, SOLUTION INTRAVENOUS ONCE
Status: COMPLETED | OUTPATIENT
Start: 2021-07-20 | End: 2021-07-20

## 2021-07-20 RX ORDER — CLONIDINE HYDROCHLORIDE 0.1 MG/1
0.1 TABLET ORAL EVERY 12 HOURS SCHEDULED
Status: DISCONTINUED | OUTPATIENT
Start: 2021-07-20 | End: 2021-07-22

## 2021-07-20 RX ORDER — LIDOCAINE 50 MG/G
1 PATCH TOPICAL DAILY
Status: DISCONTINUED | OUTPATIENT
Start: 2021-07-20 | End: 2021-07-23 | Stop reason: HOSPADM

## 2021-07-20 RX ORDER — ONDANSETRON 2 MG/ML
4 INJECTION INTRAMUSCULAR; INTRAVENOUS EVERY 6 HOURS PRN
Status: DISCONTINUED | OUTPATIENT
Start: 2021-07-20 | End: 2021-07-23 | Stop reason: HOSPADM

## 2021-07-20 RX ORDER — VANCOMYCIN HYDROCHLORIDE 1 G/200ML
15 INJECTION, SOLUTION INTRAVENOUS ONCE
Status: DISCONTINUED | OUTPATIENT
Start: 2021-07-20 | End: 2021-07-20 | Stop reason: HOSPADM

## 2021-07-20 RX ORDER — LORAZEPAM 2 MG/ML
1 INJECTION INTRAMUSCULAR ONCE
Status: COMPLETED | OUTPATIENT
Start: 2021-07-20 | End: 2021-07-20

## 2021-07-20 RX ORDER — OXYCODONE HYDROCHLORIDE 5 MG/1
5 TABLET ORAL ONCE
Status: COMPLETED | OUTPATIENT
Start: 2021-07-20 | End: 2021-07-20

## 2021-07-20 RX ORDER — NICOTINE 21 MG/24HR
1 PATCH, TRANSDERMAL 24 HOURS TRANSDERMAL DAILY
Status: DISCONTINUED | OUTPATIENT
Start: 2021-07-21 | End: 2021-07-20

## 2021-07-20 RX ORDER — OXYCODONE HYDROCHLORIDE 5 MG/1
10 TABLET ORAL EVERY 6 HOURS PRN
Status: DISCONTINUED | OUTPATIENT
Start: 2021-07-20 | End: 2021-07-23 | Stop reason: HOSPADM

## 2021-07-20 RX ORDER — OXYCODONE HYDROCHLORIDE 5 MG/1
15 TABLET ORAL EVERY 6 HOURS PRN
Status: DISCONTINUED | OUTPATIENT
Start: 2021-07-20 | End: 2021-07-23 | Stop reason: HOSPADM

## 2021-07-20 RX ORDER — OXYCODONE HYDROCHLORIDE 5 MG/1
5 TABLET ORAL EVERY 6 HOURS PRN
Status: DISCONTINUED | OUTPATIENT
Start: 2021-07-20 | End: 2021-07-20

## 2021-07-20 RX ORDER — QUETIAPINE FUMARATE 25 MG/1
50 TABLET, FILM COATED ORAL
Status: DISCONTINUED | OUTPATIENT
Start: 2021-07-20 | End: 2021-07-23 | Stop reason: HOSPADM

## 2021-07-20 RX ADMIN — VANCOMYCIN HYDROCHLORIDE 1250 MG: 5 INJECTION, POWDER, LYOPHILIZED, FOR SOLUTION INTRAVENOUS at 18:17

## 2021-07-20 RX ADMIN — CLONIDINE HYDROCHLORIDE 0.1 MG: 0.1 TABLET ORAL at 20:56

## 2021-07-20 RX ADMIN — LORAZEPAM 1 MG: 2 INJECTION INTRAMUSCULAR; INTRAVENOUS at 18:06

## 2021-07-20 RX ADMIN — GABAPENTIN 300 MG: 300 CAPSULE ORAL at 20:27

## 2021-07-20 RX ADMIN — CEFEPIME HYDROCHLORIDE 2000 MG: 2 INJECTION, SOLUTION INTRAVENOUS at 17:25

## 2021-07-20 RX ADMIN — QUETIAPINE FUMARATE 50 MG: 25 TABLET ORAL at 22:05

## 2021-07-20 RX ADMIN — OXYCODONE HYDROCHLORIDE 10 MG: 5 TABLET ORAL at 20:28

## 2021-07-20 RX ADMIN — VANCOMYCIN HYDROCHLORIDE 750 MG: 750 INJECTION, SOLUTION INTRAVENOUS at 23:13

## 2021-07-20 RX ADMIN — OXYCODONE HYDROCHLORIDE 5 MG: 5 TABLET ORAL at 23:55

## 2021-07-20 RX ADMIN — CLONAZEPAM 0.5 MG: 0.5 TABLET ORAL at 20:27

## 2021-07-20 RX ADMIN — HYDROMORPHONE HYDROCHLORIDE 0.5 MG: 1 INJECTION, SOLUTION INTRAMUSCULAR; INTRAVENOUS; SUBCUTANEOUS at 22:04

## 2021-07-20 RX ADMIN — LORAZEPAM 1 MG: 2 INJECTION INTRAMUSCULAR; INTRAVENOUS at 18:52

## 2021-07-20 RX ADMIN — APIXABAN 5 MG: 5 TABLET, FILM COATED ORAL at 20:26

## 2021-07-20 RX ADMIN — ACETAMINOPHEN 975 MG: 325 TABLET, FILM COATED ORAL at 20:27

## 2021-07-20 RX ADMIN — LIDOCAINE 5% 1 PATCH: 700 PATCH TOPICAL at 20:29

## 2021-07-20 RX ADMIN — SODIUM CHLORIDE 1000 ML: 0.9 INJECTION, SOLUTION INTRAVENOUS at 17:23

## 2021-07-20 NOTE — ED NOTES
Attempt made by Dr Susannah Bello to obtain IV access with ultrasound guidance  Bloodwork obtained but IV infiltrated  Patient was educated on the importance of receiving IV antibiotics especially due to her prior history of endocarditis  Patient is refusing IV placement, stating she needs a PICC line and wants one placed here in the ED  Explained to the patient multiple times that we do not have a PICC line team overnight and there is no timeframe as to when they could place one tomorrow as we do not know their schedules  Patient states that she wants to leave, that she will not have another IV placed by staff tonight  Bloodwork sent to lab at this time        Pedro Luis Correa RN  07/20/21 5683

## 2021-07-20 NOTE — ED PROVIDER NOTES
History  Chief Complaint   Patient presents with    Blood Infection     Pt states she was seen at another [facility] facility yesterday and was told she has a "blood infection" Pt here for IV abx treatment  History provided by:  Patient   used: No      Patient is a 21-year-old female presenting to emergency department for admission for IV antibiotics for bacteremia  Left AMA from [facility]   States she did not like how they were treating her there  Denies fevers  No chills  No nausea vomiting  No abdominal pain  No chest pain  No shortness of breath  No lightheadedness or dizziness  Review of chart shows patient needs to be on IV vancomycin  Patient has history of endocarditis  MDM will get baseline labs, will start IV vancomycin, admit to hospital    Patient would like to leave against medical advice  Prior to Admission Medications   Prescriptions Last Dose Informant Patient Reported? Taking? ARIPiprazole (ABILIFY) 10 mg tablet   Yes No   Sig: Take 10 mg by mouth daily Dosage unknown   apixaban (ELIQUIS) 5 mg   No No   Sig: Take 2 tablets (10 mg total) by mouth 2 (two) times a day for 7 days, THEN 1 tablet (5 mg total) 2 (two) times a day for 23 days     clonazePAM (KlonoPIN) 0 5 mg tablet   No No   Sig: Take 1 tablet (0 5 mg total) by mouth 2 (two) times a day for 3 days   gabapentin (NEURONTIN) 300 mg capsule   No No   Sig: Take 1 capsule (300 mg total) by mouth 2 (two) times a day for 7 days   prazosin (MINIPRESS) 1 mg capsule   No No   Sig: Take 1 capsule (1 mg total) by mouth daily at bedtime      Facility-Administered Medications: None       Past Medical History:   Diagnosis Date    Abnormal Pap smear of cervix     Anxiety     Depression     Endocarditis     2018    Hepatitis C     HPV (human papilloma virus) anogenital infection        Past Surgical History:   Procedure Laterality Date    INDUCED       IR PICC PLACEMENT DOUBLE LUMEN 2020    KNEE SURGERY Left     MOUTH SURGERY      PA REPLACE TRICUSPID W CP BYPASS N/A 9/10/2020    Procedure: REPAIR VALVE TRICUSPID with [employer] 30mm 3D Contour  Annuloplasty Ring;  Surgeon: Richard Justice MD;  [facility]: [facility];  Service: Cardiac Surgery    TOOTH EXTRACTION N/A 2020    Procedure: EXTRACTION TOOTH 32;  Surgeon: Sathya Rodriguez DMD;  Location: [facility];  Service: Maxillofacial       History reviewed  No pertinent family history  I have reviewed and agree with the history as documented  E-Cigarette/Vaping    E-Cigarette Use Current Every Day User      E-Cigarette/Vaping Substances    Nicotine Yes      Social History     Tobacco Use    Smoking status: Current Every Day Smoker     Packs/day: 0 50     Types: Cigarettes     Last attempt to quit: 2016     Years since quittin 6    Smokeless tobacco: Never Used   Vaping Use    Vaping Use: Every day    Substances: Nicotine   Substance Use Topics    Alcohol use: Not Currently     Alcohol/week: 0 0 standard drinks    Drug use: Yes     Types: Cocaine, Fentanyl     Comment: injected cocaine last night  Review of Systems   Constitutional: Negative for chills, diaphoresis and fever  Respiratory: Negative for cough, shortness of breath, wheezing and stridor  Cardiovascular: Negative for chest pain, palpitations and leg swelling  Gastrointestinal: Negative for abdominal pain, blood in stool, diarrhea, nausea and vomiting  Genitourinary: Negative for dysuria, frequency and urgency  Musculoskeletal: Negative for neck pain and neck stiffness  Skin: Positive for wound  Negative for pallor and rash  Neurological: Negative for dizziness, syncope, weakness, light-headedness and headaches  All other systems reviewed and are negative  Physical Exam  Physical Exam  Vitals reviewed  Constitutional:       Appearance: Normal appearance  She is well-developed     HENT:      Head: Normocephalic and atraumatic  Eyes:      Extraocular Movements: Extraocular movements intact  Pupils: Pupils are equal, round, and reactive to light  Cardiovascular:      Rate and Rhythm: Normal rate and regular rhythm  Heart sounds: Normal heart sounds  Pulmonary:      Effort: Pulmonary effort is normal  No respiratory distress  Breath sounds: Normal breath sounds  Abdominal:      General: Bowel sounds are normal       Palpations: Abdomen is soft  Tenderness: There is no abdominal tenderness  Musculoskeletal:         General: No swelling or tenderness  Normal range of motion  Cervical back: Normal range of motion and neck supple  Comments: Previous incision noted on the right forearm  Wound noted over the right ankle  No crepitus  Bruising noted over the left ankle  Skin:     General: Skin is warm and dry  Capillary Refill: Capillary refill takes less than 2 seconds  Neurological:      General: No focal deficit present  Mental Status: She is alert and oriented to person, place, and time  Vital Signs  ED Triage Vitals [07/19/21 2043]   Temperature Pulse Respirations Blood Pressure SpO2   98 2 °F (36 8 °C) 67 16 101/56 98 %      Temp Source Heart Rate Source Patient Position - Orthostatic VS BP Location FiO2 (%)   Oral Monitor Sitting Left arm --      Pain Score       Worst Possible Pain           Vitals:    07/19/21 2043 07/20/21 0045   BP: 101/56 109/59   Pulse: 67 (!) 54   Patient Position - Orthostatic VS: Sitting Lying         Visual Acuity      ED Medications  Medications - No data to display    Diagnostic Studies  Results Reviewed     Procedure Component Value Units Date/Time    Blood culture #2 [[id number]] Collected: 07/20/21 0250    Lab Status: Preliminary result Specimen: Blood from Arm, Right Updated: 07/20/21 0801     Blood Culture Received in Microbiology Lab  Culture in Progress      [facility] [[id number]]  (Abnormal) Collected: 07/20/21 0250    Lab Status: Final result Specimen: Blood from Arm, Right Updated: 07/20/21 0341     CK-MB Index 4 4 %      CK-MB 24 3 ng/mL     CK (with reflex to MB) [[id number]]  (Abnormal) Collected: 07/20/21 0250    Lab Status: Final result Specimen: Blood from Arm, Right Updated: 07/20/21 0340     Total  U/L     Blood culture #1 [[id number]] Collected: 07/19/21 2049    Lab Status: Preliminary result Specimen: Blood from Arm, Right Updated: 07/20/21 0001     Blood Culture Received in Microbiology Lab  Culture in Progress      Comprehensive metabolic panel [[id number]]  (Abnormal) Collected: 07/19/21 2049    Lab Status: Final result Specimen: Blood from Arm, Right Updated: 07/19/21 2132     Sodium 134 mmol/L      Potassium 3 8 mmol/L      Chloride 99 mmol/L      CO2 22 mmol/L      ANION GAP 13 mmol/L      BUN 13 mg/dL      Creatinine 1 17 mg/dL      Glucose 79 mg/dL      Calcium 9 6 mg/dL      Corrected Calcium 10 3 mg/dL      AST 35 U/L      ALT 16 U/L      Alkaline Phosphatase 118 U/L      Total Protein 9 3 g/dL      Albumin 3 1 g/dL      Total Bilirubin 0 25 mg/dL      eGFR 64 ml/min/1 73sq m     Narrative:      [place] guidelines for Chronic Kidney Disease (CKD):     Stage 1 with normal or high GFR (GFR > 90 mL/min/1 73 square meters)    Stage 2 Mild CKD (GFR = 60-89 mL/min/1 73 square meters)    Stage 3A Moderate CKD (GFR = 45-59 mL/min/1 73 square meters)    Stage 3B Moderate CKD (GFR = 30-44 mL/min/1 73 square meters)    Stage 4 Severe CKD (GFR = 15-29 mL/min/1 73 square meters)    Stage 5 End Stage CKD (GFR <15 mL/min/1 73 square meters)  Note: GFR calculation is accurate only with a steady state creatinine    CBC and differential [[id number]]  (Abnormal) Collected: 07/19/21 2049    Lab Status: Final result Specimen: Blood from Arm, Right Updated: 07/19/21 2104     WBC 9 77 Thousand/uL      RBC 4 26 Million/uL      Hemoglobin 12 2 g/dL      Hematocrit 39 9 %      MCV 94 fL      MCH 28 6 pg      MCHC 30 6 g/dL      RDW 12 7 %      MPV 8 3 fL      Platelets 493 Thousands/uL      nRBC 0 /100 WBCs      Neutrophils Relative 54 %      Immat GRANS % 1 %      Lymphocytes Relative 33 %      Monocytes Relative 8 %      Eosinophils Relative 3 %      Basophils Relative 1 %      Neutrophils Absolute 5 33 Thousands/µL      Immature Grans Absolute 0 12 Thousand/uL      Lymphocytes Absolute 3 21 Thousands/µL      Monocytes Absolute 0 77 Thousand/µL      Eosinophils Absolute 0 28 Thousand/µL      Basophils Absolute 0 06 Thousands/µL                  No orders to display              Procedures  Procedures         ED Course  ED Course as of Jul 20 1610 Tue Jul 20, 2021   0249 Attempted to place IV  Was able to get blood work  First attempt infiltrated  Patient asked for PICC team to place IV  Explained to her there is no PICC team at this time  Explained that I can attempt again  Patient states she does not want this at this time  She decided to leave against medical advice  Did not wait for paperwork  It was explained to her that she is risking a severe infection including to her heart, stroke, death  MDM    Disposition  Final diagnoses:   Bacteremia     Time reflects when diagnosis was documented in both MDM as applicable and the Disposition within this note     Time User Action Codes Description Comment    7/20/2021  2:48 AM Mechelle Sylvester Add [R78 [id number]] Bacteremia       ED Disposition     ED Disposition Condition Date/Time Comment    JÚNIOR  Tue Jul 20, 2021  2:48 AM Date: 7/20/2021  Patient: Gurpreet Quijano  Admitted: 7/20/2021 12:54 AM  Attending Provider: Melody Kaiser MD    Gurpreet Quijano or her authorized caregiver has made the decision for the patient to leave the emergency department against the  advice of her attending physician   She or her authorized caregiver has been informed and understands the inherent risks, including death, endocarditis, severe infection, stroke  She or her authorized caregiver has decided to accept the responsibili ty for this decision  Cynthia Small and all necessary parties have been advised that she may return for further evaluation or treatment  Her condition at time of discharge was serious  Cynthia Small had current vital signs as follows: B P 109/59 (BP Location: Right arm)   Pulse (!) 54   Temp 97 8 °F (36 6 °C)   Resp 16   Wt 60 3 kg (133 lb)         Follow-up Information    None         Discharge Medication List as of 7/20/2021  2:58 AM      CONTINUE these medications which have NOT CHANGED    Details   apixaban (ELIQUIS) 5 mg Multiple Dosages:Starting Sat 3/20/2021, Last dose on Fri 3/26/2021, THEN Starting Sat 3/27/2021, Last dose on Sun 4/18/2021Take 2 tablets (10 mg total) by mouth 2 (two) times a day for 7 days, THEN 1 tablet (5 mg total) 2 (two) times a day for 23 day s , Normal      ARIPiprazole (ABILIFY) 10 mg tablet Take 10 mg by mouth daily Dosage unknown, Historical Med      clonazePAM (KlonoPIN) 0 5 mg tablet Take 1 tablet (0 5 mg total) by mouth 2 (two) times a day for 3 days, Starting Sat 5/22/2021, Until Tue 5/25/2021, Normal      gabapentin (NEURONTIN) 300 mg capsule Take 1 capsule (300 mg total) by mouth 2 (two) times a day for 7 days, Starting Sat 5/22/2021, Until Sat 5/29/2021, Normal      prazosin (MINIPRESS) 1 mg capsule Take 1 capsule (1 mg total) by mouth daily at bedtime, Starting Sat 5/22/2021, Until Mon 6/21/2021, Normal           No discharge procedures on file      PDMP Review       Value Time User    PDMP Reviewed  Yes 5/25/2021 11:18 PM Jerica Leo MD          ED Provider  Electronically Signed by           Amador Schmitt MD  07/20/21 9640

## 2021-07-20 NOTE — DISCHARGE INSTRUCTIONS
Peripherally Inserted Central Catheter     WHAT YOU NEED TO KNOW:   A PICC is an IV placed into a large blood vessel near your heart  It is usually inserted through a blood vessel in your arm  Your PICC may have multiple ports  Ports are tubes where you can inject medicine  A PICC can stay in place for several weeks or months  You may need a PICC to get nutrition, medicine, or fluids  Blood samples can be removed from your PICC and sent to the lab for tests  DISCHARGE INSTRUCTIONS:    [address] [facility] and [address] [facility] patients,    Contact Interventional Radiology at [phone] PATIENTS: Contact Interventional Radiology at [phone]   [facility] PATIENTS: Contact Interventional Radiology at [phone] if:  · Blood soaks through your bandage  · Your arm or leg feels warm, tender, and painful  It may look swollen and red  · You have trouble moving your arm  · Your catheter falls out  · You have a fever or swelling, redness, pain, or pus where the catheter was inserted  · Persistent nausea or vomiting  · You cannot flush your catheter, or you feel pain when you flush your catheter  · You see a hole or crack in the tubing of your catheter  · You see fluid leaking from the insertion site  · You run out of supplies to care for your catheter  · You have questions or concerns about your condition or care

## 2021-07-20 NOTE — SEPSIS NOTE
Sepsis Note   Giovany Lara 31 y o  female MRN: [id number]  Unit/Bed#: ED 09 Encounter: [id number]      qSOFA     Row Name 07/20/21 1830 07/20/21 1815 07/20/21 1730 07/20/21 1715 07/20/21 1544    Altered mental status GCS < 15  --  --  --  --  --    Respiratory Rate > / =22  0  0  0  0  0    Systolic BP < / =975  0  0  0  0  0    Q Sofa Score  0  0  0  0  0        Initial Sepsis Screening     Row Name 07/20/21 1850                Is the patient's history suggestive of a new or worsening infection? (!) Yes (Proceed)  -LG        Suspected source of infection  -- MRSA bacteremia  -LG        Are two or more of the following signs & symptoms of infection both present and new to the patient? (!) Yes (Proceed)  -LG        Indicate SIRS criteria  Tachycardia > 90 bpm;Leukocytosis (WBC > 67392 IJL)  -LG        If the answer is yes to both questions, suspicion of sepsis is present  --        If severe sepsis is present AND tissue hypoperfusion perists in the hour after fluid resuscitation or lactate > 4, the patient meets criteria for SEPTIC SHOCK  --        Are any of the following organ dysfunction criteria present within 6 hours of suspected infection and SIRS criteria that are NOT considered to be chronic conditions?   No  -LG        Organ dysfunction  --        Date of presentation of severe sepsis  --        Time of presentation of severe sepsis  --        Tissue hypoperfusion persists in the hour after crystalloid fluid administration, evidenced, by either:  --        Was hypotension present within one hour of the conclusion of crystalloid fluid administration?  --        Date of presentation of septic shock  --        Time of presentation of septic shock  --          User Key  (r) = Recorded By, (t) = Taken By, (c) = Cosigned By    [address] Name Provider Type    LG Krishna Robbins DO Physician

## 2021-07-20 NOTE — ASSESSMENT & PLAN NOTE
· Presents to the ED due to ongoing pain throughout her body, worse in her back and right lower leg  · Patient is known to have MRSA bacteremia that had been in the process of being treated at [facility] but patient left AMA  · During admission at [place] pain was treated with lidocaine patch, gabapentin, Robaxin and p r n  oxycodone, Dilaudid for breakthrough

## 2021-07-20 NOTE — H&P
Leonciofarheen [address]  1990, 31 y o  female MRN: [id number]  Unit/Bed#: -01 Encounter: [id number]  Primary Care Provider: No primary care provider on file  Date and time admitted to hospital: 7/20/2021  3:38 PM    * MRSA bacteremia  Assessment & Plan  · Initially admitted at [facility] from 07/11 through 7/18  History of IV drug use  · Initially had a positive blood culture growing MRSA on 7/11  Repeat blood cultures performed on 07/13 with no growth  · Id was seeing patient while at [facility] suggesting patient have vancomycin for 6 weeks  PICC was placed on 07/16 but then patient left AMA on 07/18  · Blood cultures pending from 07/19 and 7/20  · Meeting sepsis criteria today due to tachycardia, elevated WBC and known bacteremia  · PICC line placed on 07/20  · ED started cefepime and vancomycin  Continue with vancomycin  · Consult infectious disease    Abscess  Assessment & Plan  · Abscess formation on right lateral ankle and left forearm   · While admitted at [facility] patient met with General surgery to have I&D  Was able to have I and D done on arm abscess but refused to have leg abscess drained due to pain  · Patient not agreeable to I&D today but is interested in talking with General surgery about possibly having abscess drained if properly medicated before hand  · Consult general surgery      Sepsis [facility])  Assessment & Plan  · Meeting sepsis criteria due to tachycardia, elevated WBC, known MRSA bacteremia, cellulitis and abscess formation  · Lactic normal, UA pending  · Patient had been started on IV vancomycin during prior admission until leaving AMA  · Patient obtained new PICC today and received IV vancomycin and cefepime  Continue with vancomycin  · Blood cultures from 07/19 and 7/20 pending  · Consult infectious disease    Cellulitis of lower extremity  Assessment & Plan  · Cellulitis of her right lower extremity    Had been receiving IV vancomycin  · Continue with vancomycin    Intravenous drug abuse ([address] )  Assessment & Plan  · Admits to using intravenous cocaine and fentanyl use  Last used cocaine today  · Monitor closely for withdrawal   Continue clonidine 0 1 b i d   · Patient also reports that she is homeless due to not being welcome at her house due to drug abuse  · Consult case management    Septic embolism [facility])  Assessment & Plan  · During admission at  be patient found to have small scattered peripheral glass home glass opacities within the bilateral lung bases due to pneumonia versus septic emboli  · COVID negative on 07/12  COVID test pending  · Denies respiratory complaints at this time  95% on room air    History of endocarditis  Assessment & Plan  · 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  · Echo performed on 07/16    Generalized pain  Assessment & Plan  · Presents to the ED due to ongoing pain throughout her body, worse in her back and right lower leg  · Patient is known to have MRSA bacteremia that had been in the process of being treated at [facility] but patient left AMA  · During admission at [facility] pain was treated with lidocaine patch, gabapentin, Robaxin and p r n  oxycodone, Dilaudid for breakthrough      Chronic anticoagulation  Assessment & Plan  · Continue Eliquis 5 mg b i d  Bipolar 1 disorder (HCC)  Assessment & Plan  · Continue Abilify 10 mg daily, Klonopin 0 5 mg b i d, Seroquel 50 mg at night    VTE Pharmacologic Prophylaxis: VTE Score: 2 Low Risk (Score 0-2) - Encourage Ambulation  Code Status: Level 1 - Full Code   Discussion with family: Patient declined call to   Anticipated Length of Stay: Patient will be admitted on an inpatient basis with an anticipated length of stay of greater than 2 midnights secondary to MRSA bacteremia      Total Time for Visit, including Counseling / Coordination of Care: 60 minutes Greater than 50% of this total time spent on direct patient counseling and coordination of care  Chief Complaint: Generalized pain    History of Present Illness:  Giovany Lara is a 31 y o  female with a PMH of IV drug abuse, endocarditis, bipolar who presents with generalized pain throughout her body and localized pain in her lower back and right ankle  Patient had been admitted at [facility] from 07/11 through 718 due to MRSA bacteremia  Patient is an IV drug user of cocaine and fentanyl  She was supposed to stay in the hospital for 6 weeks of IV vancomycin but signed out AMA on 07/18  Patient then presented to [address] [place] [address] on 07/19 but reports that she left AMA due to them not being able to obtain IV access and refusing to place a PICC  Patient is seen today in the ED noted to have abscess on her right lateral ankle  This abscess was also noted during her last admission but patient had refused for incision and drainage by surgery  She is agreeable to have an I&D by surgery if properly medicated beforehand  Patient last used cocaine this morning around 6:00 a m  Rosella Goldmann She has not been compliant with her home medications over the last couple of days due to being homeless and not having access to her medications  Review of Systems:  Review of Systems   Constitutional: Negative for fatigue and fever  HENT: Negative for sore throat  Respiratory: Negative for cough, chest tightness and shortness of breath  Cardiovascular: Negative for chest pain  Gastrointestinal: Negative for abdominal distention, abdominal pain, diarrhea, nausea and vomiting  Genitourinary: Negative for difficulty urinating  Musculoskeletal: Positive for back pain  Negative for arthralgias  Generalized pain   Skin: Positive for color change and wound (Post I/D on Left arm)  Abscess on right ankle   Neurological: Negative for weakness and headaches  Psychiatric/Behavioral: Negative for agitation and behavioral problems     All other systems reviewed and are negative  Past Medical and Surgical History:   Past Medical History:   Diagnosis Date    Abnormal Pap smear of cervix     Anxiety     Depression     Endocarditis     2018    Hepatitis C     HPV (human papilloma virus) anogenital infection        Past Surgical History:   Procedure Laterality Date    INDUCED       IR PICC PLACEMENT DOUBLE LUMEN  2020    IR PICC PLACEMENT DOUBLE LUMEN  2021    KNEE SURGERY Left     MOUTH SURGERY      AR REPLACE TRICUSPID W CP BYPASS N/A 9/10/2020    Procedure: REPAIR VALVE TRICUSPID with Medtronic 30mm 3D Contour  Annuloplasty Ring;  Surgeon: Lori Hernandez MD;  Location: [facility];  Service: Cardiac Surgery    TOOTH EXTRACTION N/A 2020    Procedure: EXTRACTION TOOTH 28;  Surgeon: Wen Frank DMD;  Location: [facility];  Service: Maxillofacial       Meds/Allergies:  Prior to Admission medications    Medication Sig Start Date End Date Taking? Authorizing Provider   apixaban (ELIQUIS) 5 mg Take 2 tablets (10 mg total) by mouth 2 (two) times a day for 7 days, THEN 1 tablet (5 mg total) 2 (two) times a day for 23 days  3/20/21 5/25/21  Gala Dorantes PA-C   ARIPiprazole (ABILIFY) 10 mg tablet Take 10 mg by mouth daily Dosage unknown    Historical Provider, MD   clonazePAM (KlonoPIN) 0 5 mg tablet Take 1 tablet (0 5 mg total) by mouth 2 (two) times a day for 3 days 21  Zuleika Javier MD   gabapentin (NEURONTIN) 300 mg capsule Take 1 capsule (300 mg total) by mouth 2 (two) times a day for 7 days 21  Zuleika Javier MD   prazosin (MINIPRESS) 1 mg capsule Take 1 capsule (1 mg total) by mouth daily at bedtime 21  Zuleika Javier MD     I have reviewed home medications with patient personally  Allergies:    Allergies   Allergen Reactions    Cat Hair Extract Itching    Dog Epithelium     Latex     Pollen Extract        Social History:  Marital Status: Single   Occupation: Unknown  Patient Pre-hospital Living Situation:  Homeless  Patient Pre-hospital Level of Mobility: walks  Patient Pre-hospital Diet Restrictions:  Regular  Substance Use History:   Social History     Substance and Sexual Activity   Alcohol Use Not Currently    Alcohol/week: 0 0 standard drinks     Social History     Tobacco Use   Smoking Status Current Every Day Smoker    Packs/day: 0 50    Types: Cigarettes    Last attempt to quit: 2016    Years since quittin 6   Smokeless Tobacco Never Used     Social History     Substance and Sexual Activity   Drug Use Yes    Types: Cocaine, Fentanyl    Comment: injected cocaine last night  Family History:  History reviewed  No pertinent family history  Physical Exam:     Vitals:   Blood Pressure: 112/57 (21)  Pulse: 78 (21)  Temperature: 97 9 °F (36 6 °C) (21)  Respirations: 18 (21)  Height: 5' 5" (165 1 cm) (21)  Weight - Scale: 60 8 kg (134 lb) (21)  SpO2: 97 % (21)    Physical Exam  Vitals and nursing note reviewed  Constitutional:       Appearance: Normal appearance  HENT:      Head: Normocephalic  Eyes:      Extraocular Movements: Extraocular movements intact  Pupils: Pupils are equal, round, and reactive to light  Cardiovascular:      Rate and Rhythm: Normal rate and regular rhythm  Heart sounds: No murmur heard  Pulmonary:      Effort: Pulmonary effort is normal  No respiratory distress  Breath sounds: Normal breath sounds  No wheezing  Abdominal:      General: Bowel sounds are normal  There is no distension  Tenderness: There is no abdominal tenderness  There is no guarding  Musculoskeletal:         General: Normal range of motion  Cervical back: Normal range of motion  Skin:     General: Skin is warm        Comments: Abscess on right ankle  Wound on left arm from I&D of abscess  Scar on chest from prior open heart surgery Neurological:      General: No focal deficit present  Mental Status: She is alert and oriented to person, place, and time  Psychiatric:         Mood and Affect: Mood is anxious  Speech: Speech is rapid and pressured  Behavior: Behavior normal          Thought Content: Thought content normal           Additional Data:     Lab Results:  Results from last 7 days   Lab Units 07/20/21  1724 07/14/21  0454   WBC Thousand/uL 12 13* 8 46   HEMOGLOBIN g/dL 12 2 9 4*   HEMATOCRIT % 38 1 28 4*   PLATELETS Thousands/uL 687* 265   BANDS PCT %  --  6   NEUTROS PCT % 68  --    LYMPHS PCT % 22  --    LYMPHO PCT %  --  14   MONOS PCT % 7  --    MONO PCT %  --  2*   EOS PCT % 2 0     Results from last 7 days   Lab Units 07/20/21  1724   SODIUM mmol/L 139   POTASSIUM mmol/L 4 3   CHLORIDE mmol/L 100   CO2 mmol/L 29   BUN mg/dL 17   CREATININE mg/dL 0 98   ANION GAP mmol/L 10   CALCIUM mg/dL 9 2   ALBUMIN g/dL 3 1*   TOTAL BILIRUBIN mg/dL 0 20   ALK PHOS U/L 111   ALT U/L 19   AST U/L 36   GLUCOSE RANDOM mg/dL 99     Results from last 7 days   Lab Units 07/20/21  1724   INR  1 09             Results from last 7 days   Lab Units 07/20/21  1724   LACTIC ACID mmol/L 1 7   PROCALCITONIN ng/ml <0 05       Imaging: Reviewed radiology reports from this admission including: chest xray  XR chest 1 view portable   ED Interpretation by Steven Mathews DO (07/20 1759)   No acute abnormalities as interpreted by me independently       IR PICC placement double lumen   Final Result by Andrae Cedillo MD (07/20 1701)   Technically successful PICC line placement as described above  The catheter is available for immediate use  Workstation performed: [id number]                 ** Please Note: This note has been constructed using a voice recognition system   **

## 2021-07-20 NOTE — ASSESSMENT & PLAN NOTE
· Cellulitis of her right lower extremity    Had been receiving IV vancomycin  · Continue with vancomycin

## 2021-07-20 NOTE — ASSESSMENT & PLAN NOTE
· Meeting sepsis criteria due to tachycardia, elevated WBC, known MRSA bacteremia, cellulitis and abscess formation  · Lactic normal, UA pending  · Patient had been started on IV vancomycin during prior admission until leaving AMA  · Patient obtained new PICC today and received IV vancomycin and cefepime    Continue with vancomycin  · Blood cultures from 07/19 and 7/20 pending  · Consult infectious disease

## 2021-07-20 NOTE — ASSESSMENT & PLAN NOTE
· Abscess formation on right lateral ankle and left forearm   · While admitted at [facility] patient met with General surgery to have I&D  Was able to have I and D done on arm abscess but refused to have leg abscess drained due to pain     · Patient not agreeable to I&D today but is interested in talking with General surgery about possibly having abscess drained if properly medicated before hand  · Consult general surgery

## 2021-07-20 NOTE — ED PROVIDER NOTES
History  Chief Complaint   Patient presents with    Blood Infection     patient reports that she was diagnosed with spesis at [facility] [place]  had a PICC line place but signed out [place]     Patient is a 44-year-old female with a past medical history significant for polysubstance abuse, endocarditis status post tricuspid valve repair, hepatitis-C, bipolar disorder, on chronic anticoagulation with Eliquis, recent admission at [facility] from 07/11 to 7/18 for MRSA bacteremia, septic emboli, cellulitis of bilateral lower extremities, multiple abscesses that were attempted for drainage by surgery at bedside, but patient refused; was being treated with Rocephin and vancomycin via PICC line, but then the patient signed out [place] on 7/18, then returned [facility] the ED at [facility] at 0200 today, but when they could not obtain IV access, left the emergency department, and now presents to the ED requesting re-admission for antibiotic treatment  Patient reports that since 7/18 she feels "ok", but continues to have pain everywhere, specifically over the abscess on her right lower leg and in her back, unchanged from prior pain that she was having during admission  Patient did use cocaine prior to arrival               Prior to Admission Medications   Prescriptions Last Dose Informant Patient Reported? Taking? ARIPiprazole (ABILIFY) 10 mg tablet   Yes No   Sig: Take 10 mg by mouth daily Dosage unknown   apixaban (ELIQUIS) 5 mg   No No   Sig: Take 2 tablets (10 mg total) by mouth 2 (two) times a day for 7 days, THEN 1 tablet (5 mg total) 2 (two) times a day for 23 days     clonazePAM (KlonoPIN) 0 5 mg tablet   No No   Sig: Take 1 tablet (0 5 mg total) by mouth 2 (two) times a day for 3 days   gabapentin (NEURONTIN) 300 mg capsule   No No   Sig: Take 1 capsule (300 mg total) by mouth 2 (two) times a day for 7 days   prazosin (MINIPRESS) 1 mg capsule   No No   Sig: Take 1 capsule (1 mg total) by mouth daily at bedtime Facility-Administered Medications: None       Past Medical History:   Diagnosis Date    Abnormal Pap smear of cervix     Anxiety     Depression     Endocarditis     2018    Hepatitis C     HPV (human papilloma virus) anogenital infection        Past Surgical History:   Procedure Laterality Date    INDUCED       IR PICC PLACEMENT DOUBLE LUMEN  2020    IR PICC PLACEMENT DOUBLE LUMEN  2021    KNEE SURGERY Left     MOUTH SURGERY      UT REPLACE TRICUSPID W CP BYPASS N/A 9/10/2020    Procedure: REPAIR VALVE TRICUSPID with [employer]tronic 30mm 3D Contour  Annuloplasty Ring;  Surgeon: Alina Padron MD;  [facility];  Service: Cardiac Surgery    TOOTH EXTRACTION N/A 2020    Procedure: EXTRACTION TOOTH 32;  Surgeon: Alphonso Palmer DMD;  [facility]: [facility];  Service: Maxillofacial       History reviewed  No pertinent family history  I have reviewed and agree with the history as documented  E-Cigarette/Vaping    E-Cigarette Use Current Every Day User      E-Cigarette/Vaping Substances    Nicotine Yes      Social History     Tobacco Use    Smoking status: Current Every Day Smoker     Packs/day: 0 50     Types: Cigarettes     Last attempt to quit: 2016     Years since quittin 6    Smokeless tobacco: Never Used   Vaping Use    Vaping Use: Every day    Substances: Nicotine   Substance Use Topics    Alcohol use: Not Currently     Alcohol/week: 0 0 standard drinks    Drug use: Yes     Types: Cocaine, Fentanyl     Comment: injected cocaine last night  Review of Systems   Constitutional: Negative for chills and fever  HENT: Negative for congestion and rhinorrhea  Eyes: Negative for photophobia and visual disturbance  Respiratory: Negative for cough and shortness of breath  Cardiovascular: Negative for chest pain and palpitations  Gastrointestinal: Negative for abdominal pain, constipation, diarrhea, nausea and vomiting     Genitourinary: Negative for dysuria, flank pain and hematuria  Musculoskeletal: Positive for back pain  Negative for neck pain  Skin: Positive for wound  Negative for color change and pallor  Neurological: Negative for dizziness, weakness, light-headedness, numbness and headaches  Physical Exam  Physical Exam  Vitals and nursing note reviewed  Constitutional:       General: She is not in acute distress  Appearance: Normal appearance  She is not ill-appearing, toxic-appearing or diaphoretic  HENT:      Head: Normocephalic and atraumatic  Mouth/Throat:      Mouth: Mucous membranes are moist    Eyes:      Extraocular Movements: Extraocular movements intact  Conjunctiva/sclera: Conjunctivae normal       Pupils: Pupils are equal, round, and reactive to light  Cardiovascular:      Rate and Rhythm: Regular rhythm  Tachycardia present  Pulses: Normal pulses  Heart sounds: Normal heart sounds  No murmur heard  Pulmonary:      Effort: Pulmonary effort is normal  No respiratory distress  Breath sounds: Normal breath sounds  No stridor  No wheezing, rhonchi or rales  Chest:      Chest wall: No tenderness  Abdominal:      General: Bowel sounds are normal  There is no distension  Palpations: Abdomen is soft  Tenderness: There is no abdominal tenderness  There is no guarding or rebound  Musculoskeletal:        Arms:       Cervical back: Normal, normal range of motion and neck supple  No swelling, edema, deformity, erythema, signs of trauma, lacerations, rigidity, spasms, torticollis, tenderness, bony tenderness or crepitus  No pain with movement  Normal range of motion  Thoracic back: No swelling, edema, deformity, signs of trauma, lacerations, spasms, tenderness or bony tenderness  Normal range of motion  No scoliosis  Lumbar back: Spasms and tenderness present  No swelling, edema, deformity, signs of trauma, lacerations or bony tenderness  Normal range of motion  Negative right straight leg raise test and negative left straight leg raise test  No scoliosis  Right lower leg: No edema  Left lower leg: No edema  Legs:    Lymphadenopathy:      Cervical: No cervical adenopathy  Skin:     General: Skin is warm and dry  Comments: Multiple scratches over all 4 extremities      Neurological:      General: No focal deficit present  Mental Status: She is alert and oriented to person, place, and time  Mental status is at baseline  GCS: GCS eye subscore is 4  GCS verbal subscore is 5  GCS motor subscore is 6  Sensory: Sensation is intact  Motor: Motor function is intact  Gait: Gait is intact  Psychiatric:         Mood and Affect: Mood is anxious  Speech: Speech is rapid and pressured           Vital Signs  ED Triage Vitals   Temperature Pulse Respirations Blood Pressure SpO2   07/20/21 1542 07/20/21 1544 07/20/21 1544 07/20/21 1544 07/20/21 1544   100 °F (37 8 °C) (!) 156 18 (!) 173/95 96 %      Temp src Heart Rate Source Patient Position - Orthostatic VS BP Location FiO2 (%)   -- 07/20/21 1544 07/20/21 1715 07/20/21 1715 --    Monitor Lying Right arm       Pain Score       07/20/21 1542       9           Vitals:    07/20/21 1715 07/20/21 1730 07/20/21 1815 07/20/21 1830   BP: 146/78 136/91 124/86 121/72   Pulse: (!) 118 (!) 123 (!) 106 (!) 120   Patient Position - Orthostatic VS: Lying  Sitting Standing         Visual Acuity      ED Medications  Medications   vancomycin (VANCOCIN) 1250 mg in sodium chloride 0 9% 250 mL IVPB (1,250 mg Intravenous New Bag 7/20/21 1817)   sodium chloride 0 9 % bolus 1,000 mL (1,000 mL Intravenous New Bag 7/20/21 1723)   cefepime (MAXIPIME) IVPB (premix in dextrose) 2,000 mg 50 mL (0 mg Intravenous Stopped 7/20/21 1806)   LORazepam (ATIVAN) injection 1 mg (1 mg Intravenous Given 7/20/21 1806)   LORazepam (ATIVAN) injection 1 mg (1 mg Intravenous Given 7/20/21 1852)       Diagnostic Studies  Results Reviewed     Procedure Component Value Units Date/Time    NOVEL CORONAVIRUS (COVID-19), PCR [facility] [[id number]] Collected: 07/20/21 1853    Lab Status: No result Specimen: Nares from Nasopharyngeal Swab     Protime-INR [[id number]]  (Normal) Collected: 07/20/21 1724    Lab Status: Final result Specimen: Blood from Arm, Right Updated: 07/20/21 1809     Protime 14 1 seconds      INR 1 09    APTT [[id number]]  (Normal) Collected: 07/20/21 1724    Lab Status: Final result Specimen: Blood from Arm, Right Updated: 07/20/21 1809     PTT 31 seconds     Lactic acid [[id number]]  (Normal) Collected: 07/20/21 1724    Lab Status: Final result Specimen: Blood from Arm, Right Updated: 07/20/21 1755     LACTIC ACID 1 7 mmol/L     Narrative:      Result may be elevated if tourniquet was used during collection      Troponin I [[id number]]  (Normal) Collected: 07/20/21 1724    Lab Status: Final result Specimen: Blood from Arm, Right Updated: 07/20/21 1755     Troponin I <0 02 ng/mL     Comprehensive metabolic panel [[id number]]  (Abnormal) Collected: 07/20/21 1724    Lab Status: Final result Specimen: Blood from Arm, Right Updated: 07/20/21 1753     Sodium 139 mmol/L      Potassium 4 3 mmol/L      Chloride 100 mmol/L      CO2 29 mmol/L      ANION GAP 10 mmol/L      BUN 17 mg/dL      Creatinine 0 98 mg/dL      Glucose 99 mg/dL      Calcium 9 2 mg/dL      Corrected Calcium 9 9 mg/dL      AST 36 U/L      ALT 19 U/L      Alkaline Phosphatase 111 U/L      Total Protein 8 4 g/dL      Albumin 3 1 g/dL      Total Bilirubin 0 20 mg/dL      eGFR 79 ml/min/1 73sq m     Narrative:      Christian guidelines for Chronic Kidney Disease (CKD):     Stage 1 with normal or high GFR (GFR > 90 mL/min/1 73 square meters)    Stage 2 Mild CKD (GFR = 60-89 mL/min/1 73 square meters)    Stage 3A Moderate CKD (GFR = 45-59 mL/min/1 73 square meters)    Stage 3B Moderate CKD (GFR = 30-44 mL/min/1 73 square meters)    Stage 4 Severe CKD (GFR = 15-29 mL/min/1 73 square meters)    Stage 5 End Stage CKD (GFR <15 mL/min/1 73 square meters)  Note: GFR calculation is accurate only with a steady state creatinine    POCT pregnancy, urine [[id number]]     Lab Status: No result     CBC and differential [[id number]]  (Abnormal) Collected: 07/20/21 1724    Lab Status: Final result Specimen: Blood from Arm, Right Updated: 07/20/21 1735     WBC 12 13 Thousand/uL      RBC 4 31 Million/uL      Hemoglobin 12 2 g/dL      Hematocrit 38 1 %      MCV 88 fL      MCH 28 3 pg      MCHC 32 0 g/dL      RDW 12 2 %      MPV 8 9 fL      Platelets 630 Thousands/uL      nRBC 0 /100 WBCs      Neutrophils Relative 68 %      Immat GRANS % 1 %      Lymphocytes Relative 22 %      Monocytes Relative 7 %      Eosinophils Relative 2 %      Basophils Relative 0 %      Neutrophils Absolute 8 37 Thousands/µL      Immature Grans Absolute 0 09 Thousand/uL      Lymphocytes Absolute 2 64 Thousands/µL      Monocytes Absolute 0 81 Thousand/µL      Eosinophils Absolute 0 18 Thousand/µL      Basophils Absolute 0 04 Thousands/µL     Blood culture #2 [[id number]] Collected: 07/20/21 1724    Lab Status: In process Specimen: Blood from Arm, Right Updated: 07/20/21 1734    Blood culture #1 [[id number]] Collected: 07/20/21 1724    Lab Status: In process Specimen: Blood from Arm, Left Updated: 07/20/21 1734    Procalcitonin with AM Reflex [[id number]] Collected: 07/20/21 1724    Lab Status: In process Specimen: Blood from Arm, Right Updated: 07/20/21 1732    UA w Reflex to Microscopic w Reflex to Culture [[id number]]     Lab Status: No result Specimen: Urine                  XR chest 1 view portable   ED Interpretation by Atul Iglesias DO (07/20 1759)   No acute abnormalities as interpreted by me independently       IR PICC placement double lumen   Final Result by Iesha Sparks MD (07/20 1701)   Technically successful PICC line placement as described above  The catheter is available for immediate use  Workstation performed: [id number]                    Procedures  ECG 12 Lead Documentation Only    Date/Time: 7/20/2021 5:07 PM  Performed by: Nicko Hamilton DO  Authorized by: Nicko Hamilton DO     Indications / Diagnosis:  Tachycardia  ECG reviewed by me, the ED Provider: yes    Patient location:  ED  Previous ECG:     Previous ECG:  Compared to current    Comparison ECG info:  7/11/2021    Similarity:  No change  Interpretation:     Interpretation: normal    Rate:     ECG rate:  124    ECG rate assessment: tachycardic    Rhythm:     Rhythm: sinus tachycardia    Ectopy:     Ectopy: none    QRS:     QRS axis:  Normal    QRS intervals:  Normal  Conduction:     Conduction: normal    ST segments:     ST segments:  Normal  T waves:     T waves: normal    Comments:                   ED Course                         Initial Sepsis Screening     Row Name 07/20/21 1403                Is the patient's history suggestive of a new or worsening infection? (!) Yes (Proceed)  -LG        Suspected source of infection  -- MRSA bacteremia  -LG        Are two or more of the following signs & symptoms of infection both present and new to the patient? (!) Yes (Proceed)  -LG        Indicate SIRS criteria  Tachycardia > 90 bpm;Leukocytosis (WBC > 12412 IJL)  -LG        If the answer is yes to both questions, suspicion of sepsis is present  --        If severe sepsis is present AND tissue hypoperfusion perists in the hour after fluid resuscitation or lactate > 4, the patient meets criteria for SEPTIC SHOCK  --        Are any of the following organ dysfunction criteria present within 6 hours of suspected infection and SIRS criteria that are NOT considered to be chronic conditions?   No  -LG        Organ dysfunction  --        Date of presentation of severe sepsis  --        Time of presentation of severe sepsis  --        Tissue hypoperfusion persists in the hour after crystalloid fluid administration, evidenced, by either:  --        Was hypotension present within one hour of the conclusion of crystalloid fluid administration?  --        Date of presentation of septic shock  --        Time of presentation of septic shock  --          User Key  (r) = Recorded By, (t) = Taken By, (c) = Cosigned By    [address] Name Provider Type    TAYLOR Leónon DO [facility] Physician          SBIRT 20yo+      Most Recent Value   SBIRT (23 yo +)   In order to provide better care to our patients, we are screening all of our patients for alcohol and drug use  Would it be okay to ask you these screening questions? Unable to answer at this time Filed at: 07/20/2021 8809                    [facility]  Number of Diagnoses or Management Options  Bacteremia due to methicillin resistant Staphylococcus aureus  Sepsis [facility])  Diagnosis management comments: Assessment and Plan:   31year old female with extremely complex medical history who is presenting for re-admission for IV antibiotic treatment of MRSA bacteremia, septic emboli  Offered patient to perform bedside I&D of the right ankle abscess, which she refused  Patient complaining of back pain that is unchanged, she has a normal neuro exam, but I did discuss with her that she may need repeat CT scan or MRI, but patient refused, stating nothing has changed and she has been undergoing too much imaging  Explained risks/benefits, which patient verbalized understanding of, but still declining imaging at this time  Bedside labs and IV attempted, but unsuccessful   As patient will need at least 6 weeks of antibiotics, asked IR to place PICC line for labs, access, antibiotics, etc         Disposition  Final diagnoses:   Sepsis ([address] )   Bacteremia due to methicillin resistant Staphylococcus aureus   Abscess of skin of right ankle     Time reflects when diagnosis was documented in both MDM as applicable and the Disposition within this note     Time User Action Codes Description Comment    7/20/2021  5:20 PM Corin Millard Jaeia Armond [A41 9] Sepsis ([address] )     7/20/2021  5:20 PM Sharion Poor Add [A50 92,  B95 62] Bacteremia due to methicillin resistant Staphylococcus aureus     7/20/2021  6:49 PM Janie Poor Add [L02 415] Abscess of skin of right ankle       ED Disposition     ED Disposition Condition Date/Time Comment    Admit Stable Tue Jul 20, 2021  5:19 PM Case was discussed with Dr Ivon Burgos and the patient's admission status was agreed to be Admission Status: inpatient status to the service of Dr Ivon Burgos   Follow-up Information    None         Patient's Medications   Discharge Prescriptions    No medications on file     No discharge procedures on file      PDMP Review       Value Time User    PDMP Reviewed  Yes 7/20/2021  6:53 PM Zoe Hargrove PA-C          ED Provider  Electronically Signed by           Han Rene DO  07/20/21 Chuy Lockhart DO  07/20/21 0887

## 2021-07-20 NOTE — ED NOTES
Multiple attempts made by Gerda Amin RN to obtain IV access with ultrasound guidance  Patient states the pain is too much and she cannot tolerate to have the IV placed  IV removed  Provider made aware        Mitch Osgood, RN  07/20/21 [facility] RN  07/20/21 8714

## 2021-07-20 NOTE — ASSESSMENT & PLAN NOTE
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

## 2021-07-20 NOTE — PROCEDURES
PICC Line Insertion    Date/Time: 7/20/2021 4:53 PM  Performed by: Omar Stockton MD  Authorized by: Omar Stockton MD     Universal protocol:     Patient identity confirmed:  Verbally with patient and arm band  Pre-procedure details:     Hand hygiene: Hand hygiene performed prior to insertion      Sterile barrier technique: All elements of maximal sterile technique followed      Skin preparation:  ChloraPrep    Skin preparation agent: Skin preparation agent completely dried prior to procedure    Indications:     PICC line indications: long term antibiotics and no peripheral vascular access    Anesthesia (see MAR for exact dosages): Anesthesia method:  Local infiltration    Local anesthetic:  Lidocaine 1% w/o epi  Procedure details:     Location:  Basilic    Vessel type: vein      Laterality:  Right    Procedural supplies:  Double lumen    Catheter size:  5 Fr    Landmarks identified: yes      Ultrasound guidance: yes      Ultrasound image availability:  Images available in PACS    Sterile ultrasound techniques: Sterile gel and sterile probe covers were used      Number of attempts:  1    Successful placement: yes      Cath access vessel: Confirmed under fluoro  Total catheter length (cm):  38    Catheter out on skin (cm):  0    Max flow rate:  999    Arm circumference:  28  Post-procedure details:     Post-procedure:  Securement device placed    Assessment:  Blood return through all ports and free fluid flow    Patient tolerance of procedure:   Tolerated well, no immediate complications

## 2021-07-20 NOTE — ASSESSMENT & PLAN NOTE
· Admits to using intravenous cocaine and fentanyl use    Last used cocaine today  · Monitor closely for withdrawal   Continue clonidine 0 1 b i d   · Patient also reports that she is homeless due to not being welcome at her house due to drug abuse  · Consult case management

## 2021-07-20 NOTE — ASSESSMENT & PLAN NOTE
· Initially admitted at [facility] from 07/11 through 7/18  History of IV drug use  · Initially had a positive blood culture growing MRSA on 7/11  Repeat blood cultures performed on 07/13 with no growth  · Id was seeing patient while at [facility] suggesting patient have vancomycin for 6 weeks  PICC was placed on 07/16 but then patient left AMA on 07/18  · Blood cultures pending from 07/19 and 7/20  · Meeting sepsis criteria today due to tachycardia, elevated WBC and known bacteremia  · PICC line placed on 07/20  · ED started cefepime and vancomycin   Continue with vancomycin  · Consult infectious disease

## 2021-07-20 NOTE — ASSESSMENT & PLAN NOTE
· During admission at  be patient found to have small scattered peripheral glass home glass opacities within the bilateral lung bases due to pneumonia versus septic emboli  · COVID negative on 07/12  COVID test pending  · Denies respiratory complaints at this time    95% on room air

## 2021-07-21 LAB
ALBUMIN SERPL BCP-MCNC: 2.3 G/DL (ref 3.5–5)
ALP SERPL-CCNC: 106 U/L (ref 46–116)
ALT SERPL W P-5'-P-CCNC: 16 U/L (ref 12–78)
ANION GAP SERPL CALCULATED.3IONS-SCNC: 7 MMOL/L (ref 4–13)
AST SERPL W P-5'-P-CCNC: 25 U/L (ref 5–45)
BACTERIA WND AEROBE CULT: ABNORMAL
BASOPHILS # BLD AUTO: 0.01 THOUSANDS/ΜL (ref 0–0.1)
BASOPHILS NFR BLD AUTO: 0 % (ref 0–1)
BILIRUB SERPL-MCNC: 0.2 MG/DL (ref 0.2–1)
BUN SERPL-MCNC: 13 MG/DL (ref 5–25)
CALCIUM ALBUM COR SERPL-MCNC: 9.7 MG/DL (ref 8.3–10.1)
CALCIUM SERPL-MCNC: 8.3 MG/DL (ref 8.3–10.1)
CHLORIDE SERPL-SCNC: 104 MMOL/L (ref 100–108)
CO2 SERPL-SCNC: 29 MMOL/L (ref 21–32)
CREAT SERPL-MCNC: 0.7 MG/DL (ref 0.6–1.3)
EOSINOPHIL # BLD AUTO: 0.29 THOUSAND/ΜL (ref 0–0.61)
EOSINOPHIL NFR BLD AUTO: 5 % (ref 0–6)
ERYTHROCYTE [DISTWIDTH] IN BLOOD BY AUTOMATED COUNT: 12.5 % (ref 11.6–15.1)
GFR SERPL CREATININE-BSD FRML MDRD: 118 ML/MIN/1.73SQ M
GLUCOSE SERPL-MCNC: 134 MG/DL (ref 65–140)
GLUCOSE SERPL-MCNC: 89 MG/DL (ref 65–140)
GLUCOSE SERPL-MCNC: 96 MG/DL (ref 65–140)
GRAM STN SPEC: ABNORMAL
GRAM STN SPEC: ABNORMAL
HCT VFR BLD AUTO: 28.5 % (ref 34.8–46.1)
HGB BLD-MCNC: 8.8 G/DL (ref 11.5–15.4)
IMM GRANULOCYTES # BLD AUTO: 0.02 THOUSAND/UL (ref 0–0.2)
IMM GRANULOCYTES NFR BLD AUTO: 0 % (ref 0–2)
LYMPHOCYTES # BLD AUTO: 1.94 THOUSANDS/ΜL (ref 0.6–4.47)
LYMPHOCYTES NFR BLD AUTO: 34 % (ref 14–44)
MCH RBC QN AUTO: 27.8 PG (ref 26.8–34.3)
MCHC RBC AUTO-ENTMCNC: 30.9 G/DL (ref 31.4–37.4)
MCV RBC AUTO: 90 FL (ref 82–98)
MONOCYTES # BLD AUTO: 0.57 THOUSAND/ΜL (ref 0.17–1.22)
MONOCYTES NFR BLD AUTO: 10 % (ref 4–12)
NEUTROPHILS # BLD AUTO: 2.95 THOUSANDS/ΜL (ref 1.85–7.62)
NEUTS SEG NFR BLD AUTO: 51 % (ref 43–75)
NRBC BLD AUTO-RTO: 0 /100 WBCS
PLATELET # BLD AUTO: 331 THOUSANDS/UL (ref 149–390)
PMV BLD AUTO: 8.5 FL (ref 8.9–12.7)
POTASSIUM SERPL-SCNC: 3.8 MMOL/L (ref 3.5–5.3)
PROCALCITONIN SERPL-MCNC: <0.05 NG/ML
PROT SERPL-MCNC: 6.5 G/DL (ref 6.4–8.2)
RBC # BLD AUTO: 3.17 MILLION/UL (ref 3.81–5.12)
SODIUM SERPL-SCNC: 140 MMOL/L (ref 136–145)
VANCOMYCIN TROUGH SERPL-MCNC: 14.2 UG/ML (ref 10–20)
WBC # BLD AUTO: 5.78 THOUSAND/UL (ref 4.31–10.16)

## 2021-07-21 PROCEDURE — 84145 PROCALCITONIN (PCT): CPT

## 2021-07-21 PROCEDURE — 80053 COMPREHEN METABOLIC PANEL: CPT | Performed by: INTERNAL MEDICINE

## 2021-07-21 PROCEDURE — 99222 1ST HOSP IP/OBS MODERATE 55: CPT | Performed by: PHYSICIAN ASSISTANT

## 2021-07-21 PROCEDURE — 82948 REAGENT STRIP/BLOOD GLUCOSE: CPT

## 2021-07-21 PROCEDURE — 80202 ASSAY OF VANCOMYCIN: CPT | Performed by: INTERNAL MEDICINE

## 2021-07-21 PROCEDURE — 99232 SBSQ HOSP IP/OBS MODERATE 35: CPT | Performed by: INTERNAL MEDICINE

## 2021-07-21 PROCEDURE — 0J9Q3ZZ DRAINAGE OF RIGHT FOOT SUBCUTANEOUS TISSUE AND FASCIA, PERCUTANEOUS APPROACH: ICD-10-PCS | Performed by: SURGERY

## 2021-07-21 PROCEDURE — 10060 I&D ABSCESS SIMPLE/SINGLE: CPT | Performed by: PHYSICIAN ASSISTANT

## 2021-07-21 PROCEDURE — 85025 COMPLETE CBC W/AUTO DIFF WBC: CPT | Performed by: INTERNAL MEDICINE

## 2021-07-21 RX ORDER — LIDOCAINE WITH 8.4% SOD BICARB 0.9%(10ML)
20 SYRINGE (ML) INJECTION ONCE
Status: DISCONTINUED | OUTPATIENT
Start: 2021-07-21 | End: 2021-07-23 | Stop reason: HOSPADM

## 2021-07-21 RX ORDER — VANCOMYCIN HYDROCHLORIDE 1 G/200ML
15 INJECTION, SOLUTION INTRAVENOUS EVERY 8 HOURS
Status: DISCONTINUED | OUTPATIENT
Start: 2021-07-21 | End: 2021-07-23 | Stop reason: HOSPADM

## 2021-07-21 RX ORDER — LORAZEPAM 2 MG/ML
0.5 INJECTION INTRAMUSCULAR ONCE
Status: COMPLETED | OUTPATIENT
Start: 2021-07-21 | End: 2021-07-21

## 2021-07-21 RX ADMIN — OXYCODONE HYDROCHLORIDE 10 MG: 5 TABLET ORAL at 23:32

## 2021-07-21 RX ADMIN — APIXABAN 5 MG: 5 TABLET, FILM COATED ORAL at 08:46

## 2021-07-21 RX ADMIN — HYDROMORPHONE HYDROCHLORIDE 0.5 MG: 1 INJECTION, SOLUTION INTRAMUSCULAR; INTRAVENOUS; SUBCUTANEOUS at 20:19

## 2021-07-21 RX ADMIN — CLONAZEPAM 0.5 MG: 0.5 TABLET ORAL at 17:39

## 2021-07-21 RX ADMIN — QUETIAPINE FUMARATE 50 MG: 25 TABLET ORAL at 22:59

## 2021-07-21 RX ADMIN — VANCOMYCIN HYDROCHLORIDE 750 MG: 750 INJECTION, SOLUTION INTRAVENOUS at 08:47

## 2021-07-21 RX ADMIN — LIDOCAINE 5% 1 PATCH: 700 PATCH TOPICAL at 08:47

## 2021-07-21 RX ADMIN — OXYCODONE HYDROCHLORIDE 10 MG: 5 TABLET ORAL at 17:38

## 2021-07-21 RX ADMIN — CLONIDINE HYDROCHLORIDE 0.1 MG: 0.1 TABLET ORAL at 20:22

## 2021-07-21 RX ADMIN — ACETAMINOPHEN 975 MG: 325 TABLET, FILM COATED ORAL at 08:45

## 2021-07-21 RX ADMIN — ARIPIPRAZOLE 10 MG: 5 TABLET ORAL at 08:46

## 2021-07-21 RX ADMIN — CLONAZEPAM 0.5 MG: 0.5 TABLET ORAL at 08:46

## 2021-07-21 RX ADMIN — GABAPENTIN 300 MG: 300 CAPSULE ORAL at 08:46

## 2021-07-21 RX ADMIN — ACETAMINOPHEN 975 MG: 325 TABLET, FILM COATED ORAL at 20:20

## 2021-07-21 RX ADMIN — VANCOMYCIN HYDROCHLORIDE 1000 MG: 1 INJECTION, SOLUTION INTRAVENOUS at 17:39

## 2021-07-21 RX ADMIN — GABAPENTIN 300 MG: 300 CAPSULE ORAL at 17:39

## 2021-07-21 RX ADMIN — APIXABAN 5 MG: 5 TABLET, FILM COATED ORAL at 17:39

## 2021-07-21 RX ADMIN — CLONIDINE HYDROCHLORIDE 0.1 MG: 0.1 TABLET ORAL at 08:46

## 2021-07-21 RX ADMIN — LORAZEPAM 0.5 MG: 2 INJECTION INTRAMUSCULAR; INTRAVENOUS at 10:35

## 2021-07-21 NOTE — ASSESSMENT & PLAN NOTE
Source is likely cellulitis/abscess  Initially admitted at [facility] from 07/11 through 7/18  History of IV drug use  Initially had a positive blood culture growing MRSA on 7/11  Repeat blood cultures performed on 07/13 with no growth     PICC line placed on 07/20, continue IV vancomycin  Consult infectious disease

## 2021-07-21 NOTE — ASSESSMENT & PLAN NOTE
sepsis present on admission as evidenced by tachycardia, leukocytosis, due to known MRSA bacteremia from cellulitis and abscess formation involving the left forearm and right ankle  Lactic normal, UA pending, procalcitonin is negative x2  Blood cultures without growth from 07/21/2021  Wound cultures collected on 07/18/2021, resulting 07/21/2021 with MRSA  Continue with IV vancomycin #2  PICC line placed 07/20/2021

## 2021-07-21 NOTE — PLAN OF CARE
Problem: Potential for Falls  Goal: Patient will remain free of falls  Description: INTERVENTIONS:  - Educate patient/family on patient safety including physical limitations  - Instruct patient to call for assistance with activity   - Consult OT/PT to assist with strengthening/mobility   - Keep Call bell within reach  - Keep bed low and locked with side rails adjusted as appropriate  - Keep care items and personal belongings within reach  - Initiate and maintain comfort rounds  - Make Fall Risk Sign visible to staff  - Offer Toileting every two Hours, in advance of need  - Initiate/Maintain bed alarm  - Obtain necessary fall risk management equipment:   - Apply yellow socks and bracelet for high fall risk patients  - Consider moving patient to room near nurses station  Outcome: Progressing     Problem: PAIN - ADULT  Goal: Verbalizes/displays adequate comfort level or baseline comfort level  Description: Interventions:  - Encourage patient to monitor pain and request assistance  - Assess pain using appropriate pain scale  - Administer analgesics based on type and severity of pain and evaluate response  - Implement non-pharmacological measures as appropriate and evaluate response  - Consider cultural and social influences on pain and pain management  - Notify physician/advanced practitioner if interventions unsuccessful or patient reports new pain  Outcome: Progressing     Problem: INFECTION - ADULT  Goal: Absence or prevention of progression during hospitalization  Description: INTERVENTIONS:  - Assess and monitor for signs and symptoms of infection  - Monitor lab/diagnostic results  - Monitor all insertion sites, i e  indwelling lines, tubes, and drains  - Monitor endotracheal if appropriate and nasal secretions for changes in amount and color  - [place] appropriate cooling/warming therapies per order  - Administer medications as ordered  - Instruct and encourage patient and family to use good hand hygiene technique  - Identify and instruct in appropriate isolation precautions for identified infection/condition  Outcome: Progressing

## 2021-07-21 NOTE — OCCUPATIONAL THERAPY NOTE
Occupational Therapy Screen Note     Patient Name: Sheryle Alter WATLY'E Date: 2021  Problem List  Principal Problem:    MRSA bacteremia  Active Problems:    Sepsis ([facility] )    Septic embolism ([facility] )    Bipolar 1 disorder ([facility] )    Intravenous drug abuse ([facility] )    Chronic anticoagulation    Cellulitis of lower extremity    Generalized pain    History of endocarditis    Abscess    Past Medical History  Past Medical History:   Diagnosis Date    Abnormal Pap smear of cervix     Anxiety     Depression     Endocarditis         Hepatitis C     HPV (human papilloma virus) anogenital infection      Past Surgical History  Past Surgical History:   Procedure Laterality Date    INDUCED       IR PICC PLACEMENT DOUBLE LUMEN  2020    IR PICC PLACEMENT DOUBLE LUMEN  2021    KNEE SURGERY Left     MOUTH SURGERY      OR REPLACE TRICUSPID W CP BYPASS N/A 9/10/2020    Procedure: REPAIR VALVE TRICUSPID with Medtronic 30mm 3D Contour  Annuloplasty Ring;  Surgeon: Yuliya Negron MD;  [facility]: [facility];  Service: Cardiac Surgery    TOOTH EXTRACTION N/A 2020    Procedure: EXTRACTION TOOTH 32;  Surgeon: Roderick Goode DMD;  Location: [facility];  Service: Maxillofacial         21 1505   OT Last Visit   OT Visit Date 21   Note Type   Note type Screen   Assessment   Assessment OT orders received  Chart reviewed  Per FERNANDO Espinoza pt independent within room  No acute OT needs identified at this time  Will D/C OT orders            PRIYANK Najera/JUDY

## 2021-07-21 NOTE — PROGRESS NOTES
Vancomycin Assessment    Dontae Vegas is a 31 y o  female who is currently receiving vancomycin 750mg q8 hours for bacteremia, MRSA confirmed, endocarditis     Relevant clinical data and objective history reviewed:  Creatinine   Date Value Ref Range Status   07/21/2021 0 70 0 60 - 1 30 mg/dL Final     Comment:     Standardized to IDMS reference method   07/20/2021 0 98 0 60 - 1 30 mg/dL Final     Comment:     Standardized to IDMS reference method   07/19/2021 1 17 0 60 - 1 30 mg/dL Final     Comment:     Standardized to IDMS reference method     /57   Pulse 78   Temp 97 9 °F (36 6 °C)   Resp 18   Ht 5' 5" (1 651 m)   Wt 60 8 kg (134 lb)   SpO2 97%   BMI 22 30 kg/m²   I/O last 3 completed shifts: In: 3403 [P O :480; IV Piggyback:1050]  Out: -   Lab Results   Component Value Date/Time    BUN 13 07/21/2021 04:57 AM    WBC 5 78 07/21/2021 07:43 AM    WBC 8 4 07/26/2016 12:00 AM    HGB 8 8 (L) 07/21/2021 07:43 AM    HGB 13 3 03/17/2017 10:19 AM    HCT 28 5 (L) 07/21/2021 07:43 AM    HCT 36 1 10/27/2016 11:35 AM    MCV 90 07/21/2021 07:43 AM    MCV 90 07/26/2016 12:00 AM     07/21/2021 07:43 AM     07/26/2016 12:00 AM     Temp Readings from Last 3 Encounters:   07/20/21 97 9 °F (36 6 °C)   07/20/21 97 8 °F (36 6 °C)   07/18/21 98 °F (36 7 °C) (Oral)     Vancomycin Days of Therapy: 2    Assessment/Plan  The patient is currently on vancomycin utilizing scheduled dosing  The patient is receiving 750mg q8 hours with the most recent vancomycin level being not at steady-state (trough 7/21 = 14 2) and sub-therapeutic based on a goal of 15-20 (appropriate for most indications) ; therefore, after clinical evaluation will be changed to 1000mg every 8 hours   Pharmacy will continue to follow closely for s/sx of nephrotoxicity, infusion reactions, and appropriateness of therapy  BMP and CBC will be ordered per protocol    Plan for trough as patient approaches steady state, prior to the 4th  dose at approximately 1630 on 07/22/2021  Pharmacy will continue to follow the patients culture results and clinical progress daily      Constance Hatch, Pharmacist PharmD, BCPS

## 2021-07-21 NOTE — ASSESSMENT & PLAN NOTE
· Presents to the ED due to ongoing pain throughout her body, worse in her back and right lower leg  · Patient is known to have MRSA bacteremia that had been in the process of being treated at [facility] but patient left AMA  · Pain management as ordered  ·

## 2021-07-21 NOTE — ASSESSMENT & PLAN NOTE
· Admits to using intravenous cocaine and fentanyl use    Last used cocaine today  · Monitor closely for withdrawal   Continue clonidine 0 1 b i d , can add gabapentin if needed  · Patient also reports that she is homeless due to not being welcome at her house due to drug abuse  · Consult case management

## 2021-07-21 NOTE — PROCEDURES
Incision and Drainage Procedure--Attempted, NOT COMPLETED  Date/Time: 7/21/2021  Performed by: Olga Russell PA-C  Authorized by: MILLIE Blankenship    Universal Protocol:  Procedure performed by: MILLIE Blankenship  Consent: Verbal and written consent obtained  Risks and benefits: Risks, benefits and alternatives were discussed  Consent given by: Patient  Time out: Immediately prior to procedure a "time out" was called to verify the correct patient, procedure, equipment, support staff and site/side marked as required  JESUS Ramesh and Ronnell Duval RN were present for start of procedure  Patient understanding: Patient stated understanding of the procedure being performed and was willing to undergo procedure  Radiology Images displayed and confirmed  If images not available, report reviewed: No imaging studies available  Patient identity confirmed: Verbally with patient and arm band     Patient location:  Bedside  Location:     Type:  Abscess    Location:  Lower extremity    Lower extremity location: Right lateral ankle    Pre-procedure details:     Skin preparation:  Povidone Iodine   Anesthesia (see MAR for exact dosages): Anesthesia method:  Local infiltration    Local anesthetic:  Lidocaine 1% Buffered     Procedure details:   A one time dose of 0 5 mg Ativan was given for patient comfort  Procedure was aborted  Patient refused any further intervention after 5 mL Lidocaine was injected to the area  Ronnell Duval RN and I again discussed the risks of not performing the I&D  Patient showed understanding but still refused to allow any further intervention

## 2021-07-21 NOTE — PROGRESS NOTES
Vancomycin Assessment    Jamal Santacruz is a 31 y o  female who is currently receiving vancomycin 750mg every 8 hours for bacteremia, MRSA confirmed , endocartitis    Relevant clinical data and objective history reviewed:  Creatinine   Date Value Ref Range Status   07/20/2021 0 98 0 60 - 1 30 mg/dL Final     Comment:     Standardized to IDMS reference method   07/19/2021 1 17 0 60 - 1 30 mg/dL Final     Comment:     Standardized to IDMS reference method   07/17/2021 0 48 (L) 0 60 - 1 30 mg/dL Final     Comment:     Standardized to IDMS reference method     /69 (BP Location: Left arm)   Pulse 84   Temp 100 °F (37 8 °C)   Resp 18   Ht 5' 5" (1 651 m)   Wt 60 8 kg (134 lb)   SpO2 97%   BMI 22 30 kg/m²   I/O last 3 completed shifts: In: 48 [IV Piggyback:50]  Out: -   Lab Results   Component Value Date/Time    BUN 17 07/20/2021 05:24 PM    WBC 12 13 (H) 07/20/2021 05:24 PM    WBC 8 4 07/26/2016 12:00 AM    HGB 12 2 07/20/2021 05:24 PM    HGB 13 3 03/17/2017 10:19 AM    HCT 38 1 07/20/2021 05:24 PM    HCT 36 1 10/27/2016 11:35 AM    MCV 88 07/20/2021 05:24 PM    MCV 90 07/26/2016 12:00 AM     (H) 07/20/2021 05:24 PM     07/26/2016 12:00 AM     Temp Readings from Last 3 Encounters:   07/20/21 100 °F (37 8 °C)   07/20/21 97 8 °F (36 6 °C)   07/18/21 98 °F (36 7 °C) (Oral)     Vancomycin Days of Therapy: 1    Assessment/Plan  The patient is currently on vancomycin utilizing scheduled dosing  Patient was admitted at [facility] and left [facility] on 7/18  Patient was on Day 6 of vancomycin therapy on 7/18  Last level was on 7/17 and was supratherapeutic 21 9 and pt was on 1250mg q8 hours  Yesterday patient was present at [facility] ED but left [facility]  Currently,  The patient received 1250mg one time dose in the ED and will be receiving 750mg every 8 hours for a target goal of 15-20 (appropriate for most indications)   Pharmacy will continue to follow closely for s/sx of nephrotoxicity, infusion reactions, and appropriateness of therapy  BMP and CBC will be ordered per protocol  Plan for trough as patient approaches steady state, prior to the 4th  dose at approximately 1530 on 07/21/2021  Pharmacy will continue to follow the patients culture results and clinical progress daily      Michelle Shen, Pharmacist PharmD, BCPS

## 2021-07-21 NOTE — ASSESSMENT & PLAN NOTE
Source is likely cellulitis/abscess  Initially admitted at [facility] from 07/11 through 7/18  History of IV drug use  Initially had a positive blood culture growing MRSA on 7/11  Repeat blood cultures performed on 07/13 with no growth     TTE 07/16/2021: no vegetation noted  PICC line placed on 07/20, continue IV vancomycin  Complaint of back pain, check CT T/L with contrast   Consult infectious disease

## 2021-07-21 NOTE — UTILIZATION REVIEW
Initial Clinical Review    Admission: Date/Time/Statement:   Admission Orders (From admission, onward)     Ordered        07/20/21 1846  INPATIENT ADMISSION  Once                   Orders Placed This Encounter   Procedures    INPATIENT ADMISSION     Standing Status:   Standing     Number of Occurrences:   1     Order Specific Question:   Level of Care     Answer:   Med Surg [16]     Order Specific Question:   Estimated length of stay     Answer:   More than 2 Midnights     Order Specific Question:   Certification     Answer:   I certify that inpatient services are medically necessary for this patient for a duration of greater than two midnights  See H&P and MD Progress Notes for additional information about the patient's course of treatment  ED Arrival Information     Expected Arrival Acuity    - 7/20/2021 15:36 Emergent         Means of arrival Escorted by Service Admission type    [place] [address] Emergency         Arrival complaint    arm wound         Chief Complaint   Patient presents with    Blood Infection     patient reports that she was diagnosed with spesis at [facility]  had a PICC line place but signed out AMA       Initial Presentation: 32 yo homeless fem w/hx IV cocaine/fentanyl use, hep c, hpv, endocarditis, tricuspid valve repair, bipolar to ED from home admitted as inpatient due to MRSA bacteremia and sepsis  Presented to [facility] ED with generalized body pain localized to lower back and R ankle after she signed out AMA from [address] [place] [address] from an admission on 7/18 during which she was treated for MRSA bacteremia  She was supposed to stay for 6 weeks of IV antbx  On arrival for current admit, has abscess R ankle, which was present before and she refused I&D  She now agrees to I&D if she receives pre medications  Also has L arm wound from prior I&D and open heart surgery scar  Used cocaine the am of arrival and is noncompliant with home meds   PICC placed, IV antbx started, ID and general surgery consulted  Date: 7/21   Day 2: 95%RA  Extremely sleepy and asking not to be disturbed  Would not allow physical exam  No complaints  MRSA bacteremia is likely from cellulitis/abscess  Per surg: L forearm I&D healing, no further intervention needed  R ankle abscess-attempted I&D, pt refused at the last minute twice despite receiving IV ativan  She is refusing the standard of care treatment and recommend AMA papers  PM Update: staff attempted to perform washout of R ankle  She refused unless she was given antianxiety medication  Ativan given  She was informed her infection could worsen and she could become extremely sick or even die  She then screamed and told everyone to get out and slammed the door on the staff  Said she wanted to be left alone  ID has been consulted; however, it is unclear if patient will allow them to see her  The consult has not yet been completed as of [email or web address]       ED Triage Vitals   Temperature Pulse Respirations Blood Pressure SpO2   07/20/21 1542 07/20/21 1544 07/20/21 1544 07/20/21 1544 07/20/21 1544   100 °F (37 8 °C) (!) 156 18 (!) 173/95 96 %      Temp src Heart Rate Source Patient Position - Orthostatic VS BP Location FiO2 (%)   -- 07/20/21 1544 07/20/21 1715 07/20/21 1715 --    Monitor Lying Right arm       Pain Score       07/20/21 1542       9          Wt Readings from Last 1 Encounters:   07/20/21 60 8 kg (134 lb)     Additional Vital Signs:   Date/Time  Temp  Pulse  Resp  BP  MAP (mmHg)  SpO2  O2 Device    07/20/21 2204  --  --  --  --  --  97 %  None (Room air)    07/20/21 21:30:23  97 9 °F (36 6 °C)  78  --  112/57  75  97 %  --    07/20/21 2030  --  98  18  110/68  84  95 %  None (Room air)    07/20/21 2000  --  84  18  112/69  86  97 %  None (Room air)    07/20/21 1930  --  93  18  111/69  83  96 %  None (Room air)    07/20/21 1830  --  120Abnormal   18  121/72  91  95 %  None (Room air)    07/20/21 1815  --  106Abnormal   18 124/86  98  97 %  None (Room air)    07/20/21 1730  --  123Abnormal   16  136/91  106  94 %  --    07/20/21 1715  --  118Abnormal   20  146/78  104  97 %  None (Room air)        Pertinent Labs/Diagnostic Test Results:   XR chest 1 view portable   ED Interpretation by Landon Stone DO (07/20 6489)   No acute abnormalities as interpreted by me independently       Final Result by Jhonathan Carter MD (07/21 9495)      No acute cardiopulmonary disease  IR PICC placement double lumen   Final Result by Estephania Sumner MD (07/20 1701)   Technically successful PICC line placement as described above   The catheter is available for immediate use      7/20 EKG sinus tach    Results from last 7 days   Lab Units 07/20/21  1853   SARS-COV-2  Negative     Results from last 7 days   Lab Units 07/21/21  0743 07/20/21  1724 07/19/21 2049 07/17/21  0530   WBC Thousand/uL 5 78 12 13* 9 77 7 25   HEMOGLOBIN g/dL 8 8* 12 2 12 2 9 7*   HEMATOCRIT % 28 5* 38 1 39 9 31 2*   PLATELETS Thousands/uL 331 687* 471* 339   NEUTROS ABS Thousands/µL 2 95 8 37* 5 33 3 25         Results from last 7 days   Lab Units 07/21/21  0457 07/20/21  1724 07/19/21 2049 07/17/21  0530   SODIUM mmol/L 140 139 134* 140   POTASSIUM mmol/L 3 8 4 3 3 8 3 8   CHLORIDE mmol/L 104 100 99* 107   CO2 mmol/L 29 29 22 27   ANION GAP mmol/L 7 10 13 6   BUN mg/dL 13 17 13 6   CREATININE mg/dL 0 70 0 98 1 17 0 48*   EGFR ml/min/1 73sq m 118 79 64 134   CALCIUM mg/dL 8 3 9 2 9 6 8 2*     Results from last 7 days   Lab Units 07/21/21  0457 07/20/21  1724 07/19/21 2049   AST U/L 25 36 35   ALT U/L 16 19 16   ALK PHOS U/L 106 111 118*   TOTAL PROTEIN g/dL 6 5 8 4* 9 3*   ALBUMIN g/dL 2 3* 3 1* 3 1*   TOTAL BILIRUBIN mg/dL 0 20 0 20 0 25     Results from last 7 days   Lab Units 07/21/21  1148 07/21/21  0820   POC GLUCOSE mg/dl 134 96     Results from last 7 days   Lab Units 07/21/21  0457 07/20/21  1724 07/19/21  2049 07/17/21  0530   GLUCOSE RANDOM mg/dL 89 99 79 94 Results from last 7 days   Lab Units 07/20/21  0250   CK TOTAL U/L 558*   CK MB INDEX % 4 4*   CK MB ng/mL 24 3*     Results from last 7 days   Lab Units 07/20/21  1724   TROPONIN I ng/mL <0 02         Results from last 7 days   Lab Units 07/20/21  1724   PROTIME seconds 14 1   INR  1 09   PTT seconds 31         Results from last 7 days   Lab Units 07/21/21  0457 07/20/21  1724   PROCALCITONIN ng/ml <0 05 <0 05     Results from last 7 days   Lab Units 07/20/21  1724   LACTIC ACID mmol/L 1 7       Results from last 7 days   Lab 07/20/21  1724 07/20/21  0250 07/19/21  2049 07/18/21  1720   BLOOD CULTURE Received in Microbiology Lab  Culture in Progress  Received in Microbiology Lab  Culture in Progress  No Growth at 24 hrs   No Growth at 24 hrs   --    GRAM STAIN RESULT  --   --   --  4+ Polys*  1+ Gram positive cocci in pairs, chains and clusters*   WOUND CULTURE  --   --   --  1+ Growth of Methicillin Resistant Staphylococcus aureus*               ED Treatment:   Medication Administration from 07/20/2021 1536 to 07/20/2021 2123       Date/Time Order Dose Route Action     07/20/2021 1723 sodium chloride 0 9 % bolus 1,000 mL 1,000 mL Intravenous New Bag     07/20/2021 1817 vancomycin (VANCOCIN) 1250 mg in sodium chloride 0 9% 250 mL IVPB 1,250 mg Intravenous New Bag     07/20/2021 1725 cefepime (MAXIPIME) IVPB (premix in dextrose) 2,000 mg 50 mL 2,000 mg Intravenous New Bag     07/20/2021 1806 LORazepam (ATIVAN) injection 1 mg 1 mg Intravenous Given     07/20/2021 1852 LORazepam (ATIVAN) injection 1 mg 1 mg Intravenous Given     07/20/2021 2026 apixaban (ELIQUIS) tablet 5 mg 5 mg Oral Given     07/20/2021 2027 clonazePAM (KlonoPIN) tablet 0 5 mg 0 5 mg Oral Given     07/20/2021 2027 gabapentin (NEURONTIN) capsule 300 mg 300 mg Oral Given     07/20/2021 2027 acetaminophen (TYLENOL) tablet 975 mg 975 mg Oral Given     07/20/2021 2056 cloNIDine (CATAPRES) tablet 0 1 mg 0 1 mg Oral Given     07/20/2021 2028 oxyCODONE (ROXICODONE) IR tablet 10 mg 10 mg Oral Given     07/20/2021 2029 lidocaine (LIDODERM) 5 % patch 1 patch 1 patch Topical Medication Applied        Past Medical History:   Diagnosis Date    Abnormal Pap smear of cervix     Anxiety     Depression     Endocarditis     2018    Hepatitis C     HPV (human papilloma virus) anogenital infection      Present on Admission:   Intravenous drug abuse ([facility] )   MRSA bacteremia   Abscess   Cellulitis of lower extremity   Generalized pain   Bipolar 1 disorder ([facility])   Septic embolism ([facility])      Admitting Diagnosis: Abscess [L02 91]  Blood infection (HCC) [A41 9]  Cellulitis of left lower extremity [L03 116]  History of endocarditis [Z86 79]  Bacteremia due to methicillin resistant Staphylococcus aureus [R78 81, B95 62]  MRSA bacteremia [R78 81, B95 62]  Sepsis ([facility] ) [A41 9]  Abscess of skin of right ankle [L02 415]  Age/Sex: 31 y o  female  Admission Orders:  Scheduled Medications:  acetaminophen, 975 mg, Oral, Q8H  apixaban, 5 mg, Oral, BID  ARIPiprazole, 10 mg, Oral, Daily  clonazePAM, 0 5 mg, Oral, BID  cloNIDine, 0 1 mg, Oral, Q12H JEFF  gabapentin, 300 mg, Oral, BID  lidocaine, 1 patch, Topical, Daily  lidocaine 1% buffered, 20 mL, Infiltration, Once  QUEtiapine, 50 mg, Oral, HS  vancomycin, 12 5 mg/kg, Intravenous, Q8H    IV ativan [email or web address]    Continuous IV Infusions: none   PRN Meds:  HYDROmorphone, 0 5 mg, Intravenous, Q4H PRN [email or web address]  hydrOXYzine HCL, 25 mg, Oral, Q6H PRN  methocarbamol, 750 mg, Oral, Q6H PRN  ondansetron, 4 mg, Intravenous, Q6H PRN  oxyCODONE, 10 mg, Oral, Q6H PRN [email or web address]  oxyCODONE, 15 mg, Oral, Q6H PRN    House diet    IP CONSULT TO ACUTE CARE SURGERY  IP CONSULT TO INFECTIOUS DISEASES      Network Utilization Review Department  ATTENTION: Please call with any questions or concerns to [phone] and carefully listen to the prompts so that you are directed to the right person   All voicemails are confidential   Meño Jaimes all requests for admission clinical reviews, approved or denied determinations and any other requests to dedicated fax number below belonging to the campus where the patient is receiving treatment   List of dedicated fax numbers for the Facilities:  [address] [place] [address] DENIALS (Administrative/Medical Necessity) [phone]   [address] (Maternity/NICU/Pediatrics) [phone]   [address] [facility] [id number] [place] [address] [address]ena[address] [place] [address] [phone]

## 2021-07-21 NOTE — NURSING NOTE
PA in patients room to perform a washout of the right ankle abscess  Patient refused unless she was given antianxiety medication  Order for ativan obtained and administered  On attempt to start the procedure the patient screamed and told the team to get it out  Patient informed that if her infection worsened she could get extremely sick and even die  Patient screamed get out of my room I want to be left alone and slammed the door on staff  Practitioner made aware

## 2021-07-21 NOTE — PHYSICAL THERAPY NOTE
Physical Therapy Screen    Patient Name: Dennise Robles    QFQZF'H Date: 2021     Problem List  Principal Problem:    MRSA bacteremia  Active Problems:    [facility]epsis ([facility] )    Septic embolism ([facility] )    Bipolar 1 disorder ([facility] )    Intravenous drug abuse ([facility] )    Chronic anticoagulation    Cellulitis of lower extremity    Generalized pain    History of endocarditis    Abscess       Past Medical History  Past Medical History:   Diagnosis Date    Abnormal Pap smear of cervix     Anxiety     Depression     Endocarditis         Hepatitis C     HPV (human papilloma virus) anogenital infection         Past Surgical History  Past Surgical History:   Procedure Laterality Date    INDUCED       IR PICC PLACEMENT DOUBLE LUMEN  2020    IR PICC PLACEMENT DOUBLE LUMEN  2021    KNEE SURGERY Left     MOUTH SURGERY      [place] REPLACE TRICUSPID W CP BYPASS N/A 9/10/2020    Procedure: REPAIR VALVE TRICUSPID with Medtronic 30mm 3D Contour  Annuloplasty Ring;  Surgeon: Giovany Pacheco MD;  Location: [facility];  Service: Cardiac Surgery    TOOTH EXTRACTION N/A 2020    Procedure: EXTRACTION TOOTH 32;  Surgeon: Alphonso Palmer DMD;  Location: [facility];  Service: Maxillofacial     No need for skilled PT; nurse reports pt ambulating at independent level in room, no mobility deficits; Will DC PT order at this time, re-consult if mobility status changes      Shabbir Salgado, PT

## 2021-07-21 NOTE — PROGRESS NOTES
[place]  Progress Note Alicia Edge 1990, 31 y o  female MRN: [id number]  Unit/Bed#: -01 Encounter: [id number]  Primary Care Provider: No primary care provider on file  Date and time admitted to hospital: 7/20/2021  3:38 PM    Sepsis [facility])  Assessment & Plan  sepsis present on admission as evidenced by tachycardia, leukocytosis, due to known MRSA bacteremia from cellulitis and abscess formation involving the left forearm and right ankle  Lactic normal, UA pending, procalcitonin is negative x2  Blood cultures without growth from 07/21/2021  Wound cultures collected on 07/18/2021, resulting 07/21/2021 with MRSA  Continue with IV vancomycin #2  PICC line placed 07/20/2021      * MRSA bacteremia  Assessment & Plan  Source is likely cellulitis/abscess  Initially admitted at 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 but patient left AMA  · Pain management as ordered  ·       Cellulitis of lower extremity  Assessment & Plan  · Cellulitis of her right lower extremity  · Continue with vancomycin    Chronic anticoagulation  Assessment & Plan  · Continue Eliquis 5 mg b i d      Intravenous drug abuse [facility])  Assessment & Plan  · Admits to using intravenous cocaine and fentanyl use  Last used cocaine today  · Monitor closely for withdrawal   Continue clonidine 0 1 b i d , can add gabapentin if needed  · Patient also reports that she is homeless due to not being welcome at her house due to drug abuse  · Consult case management    Bipolar 1 disorder (HCC)  Assessment & Plan  · Continue Abilify 10 mg daily, Klonopin 0 5 mg b i d, Seroquel 50 mg at night    Septic embolism (HCC)  Assessment & Plan  · During admission at  be patient found to have small scattered peripheral glass home glass opacities within the bilateral lung bases due to pneumonia versus septic emboli  · COVID negative on   COVID test pending  · Denies respiratory complaints at this time  95% on room air          VTE Pharmacologic Prophylaxis: VTE Score: 2 Moderate Risk (Score 3-4) - Pharmacological DVT Prophylaxis Ordered: apixaban (Eliquis)  Patient Centered Rounds: I performed bedside rounds with nursing staff today  Discussions with Specialists or Other Care Team Provider:     Education and Discussions with Family / Patient: Patient declined call to   Time Spent for Care: 30 minutes  More than 50% of total time spent on counseling and coordination of care as described above  Current Length of Stay: 1 day(s)  Current Patient Status: Inpatient   Certification Statement: The patient will continue to require additional inpatient hospital stay due to MRSA Bacteremia on antibiotics  Discharge Plan: Anticipate discharge in >72 hrs to ongoing     Code Status: Level 1 - Full Code    Subjective:   Extremely sleepy, asking not to be disturbed   Offers no complaints     Objective:     Vitals:   Temp (24hrs), Av °F (37 2 °C), Min:97 9 °F (36 6 °C), Max:100 °F (37 8 °C)    Temp:  [97 9 °F (36 6 °C)-100 °F (37 8 °C)] 97 9 °F (36 6 °C)  HR:  [] 78  Resp:  [16-20] 18  BP: (110-173)/(57-95) 112/57  SpO2:  [94 %-97 %] 97 %  Body mass index is 22 3 kg/m²  Input and Output Summary (last 24 hours): Intake/Output Summary (Last 24 hours) at 7/21/2021 1236  Last data filed at 7/20/2021 2201  Gross per 24 hour   Intake 1530 ml   Output --   Net 1530 ml       Physical Exam:    not performed, stated she was sleeping     Additional Data:     Labs:  Results from last 7 days   Lab Units 07/21/21  0743   WBC Thousand/uL 5 78   HEMOGLOBIN g/dL 8 8*   HEMATOCRIT % 28 5*   PLATELETS Thousands/uL 331   NEUTROS PCT % 51   LYMPHS PCT % 34   MONOS PCT % 10   EOS PCT % 5     Results from last 7 days   Lab Units 07/21/21  0457   SODIUM mmol/L 140   POTASSIUM mmol/L 3 8   CHLORIDE mmol/L 104   CO2 mmol/L 29   BUN mg/dL 13   CREATININE mg/dL 0 70   ANION GAP mmol/L 7   CALCIUM mg/dL 8 3   ALBUMIN g/dL 2 3*   TOTAL BILIRUBIN mg/dL 0 20   ALK PHOS U/L 106   ALT U/L 16   AST U/L 25   GLUCOSE RANDOM mg/dL 89     Results from last 7 days   Lab Units 07/20/21  1724   INR  1 09     Results from last 7 days   Lab Units 07/21/21  1148 07/21/21  0820   POC GLUCOSE mg/dl 134 96         Results from last 7 days   Lab Units 07/21/21  0457 07/20/21  1724   LACTIC ACID mmol/L  --  1 7   PROCALCITONIN ng/ml <0 05 <0 05       Lines/Drains:  Invasive Devices     Peripherally Inserted Central Catheter Line            PICC Line 07/20/21 <1 day                Central Line:  Goal for removal: N/A - Discharging with PICC for IV ABX/medications             Imaging: Reviewed radiology reports from this admission including: chest xray and Personally reviewed the following imaging: chest xray    Recent Cultures (last 7 days):   Results from last 7 days   Lab Units 07/20/21  1724 07/20/21  0250 07/19/21  2049 07/18/21  1720   BLOOD CULTURE  Received in Microbiology Lab  Culture in Progress  Received in Microbiology Lab  Culture in Progress  No Growth at 24 hrs   No Growth at 24 hrs   --    GRAM STAIN RESULT   --   --   --  4+ Polys*  1+ Gram positive cocci in pairs, chains and clusters*   WOUND CULTURE   --   --   --  1+ Growth of Methicillin Resistant Staphylococcus aureus*       Last 24 Hours Medication List:   Current Facility-Administered Medications   Medication Dose Route Frequency Provider Last Rate    acetaminophen  975 mg Oral Q8H Alana Johnston, MILLIE      apixaban  5 mg Oral BID Amaya Wallace, PA-C      ARIPiprazole  10 mg Oral Daily Amaya Wallace, PA-C      clonazePAM  0 5 mg Oral BID Amaya Wallace, PA-C      cloNIDine  0 1 mg Oral Q12H [address] Amaya Wallace, PA-C      gabapentin  300 mg Oral BID Amaya Peeddie, PA-C      HYDROmorphone  0 5 mg Intravenous Q4H PRN Amaya Peeddie, PA-C      hydrOXYzine HCL  25 mg Oral Q6H PRN Amaya Peedide, PA-C      lidocaine  1 patch Topical Daily Amayauche Gonsalez, PA-C      lidocaine 1% buffered  20 mL Infiltration Once Adriana Rack David, PA-C      methocarbamol  750 mg Oral Q6H PRN Amaya Wallace, PA-C      ondansetron  4 mg Intravenous Q6H PRN Amaya Wallace, PA-C      oxyCODONE  10 mg Oral Q6H PRN Amaya Wallace, PA-C      oxyCODONE  15 mg Oral Q6H PRN Amaya Wallace, PA-C      QUEtiapine  50 mg Oral HS Amaya Gonsalez, PA-C      vancomycin  12 5 mg/kg Intravenous Q8H Alana Johnston, PA-C 750 mg (07/21/21 0847)        Today, Patient Was Seen By: Jt Payne MD    **Please Note: This note may have been constructed using a voice recognition system  **

## 2021-07-21 NOTE — ASSESSMENT & PLAN NOTE
· Abscess formation on right lateral ankle and left forearm   · Consult general surgery, patient refused drainage None

## 2021-07-21 NOTE — CONSULTS
Consultation - General Surgery   Ana Landry 31 y o  female MRN: [id number]  Unit/Bed#: -01 Encounter: [id number]    Assessment/Plan     Multiple skin abscesses related to IVDA  - left forearm abscess opened at [facility] last week  Abscess is drained and incision site has closed over  Mild erythema  No significant induration or underlying fluctuance  Continue local wound care   - right ankle abscess approximately 2 cm with underlying fluctuance and surrounding erythema, tender to palpation  Patient consented for I&D procedure  However refused as procedure was about to begin  Patient asking for anxiety medication  Will order 1 dose of Ativan and re-attempt later today  - patient apparently refused I&D of this abscess last week at [facility] as well and in the emergency department yesterday  -continue IV ABx     Sepsis POA  - secondary to tachycardia, leukocytosis,  Known history of MRSA bacteremia, cellulitis and abscesses  - continue IV antibiotics  - repeat blood cultures pending    MRSA bacteremia  - admitted to [facility] from 07/11 through 7/18  With findings of skin abscesses and blood cultures positive for MRSA  - repeat blood cultures were negative on that admission  -ID recommended 6 weeks of IV vancomycin  - patient left AMA prior to getting treated    IVDA, Hep C  -admits to IV cocaine and fentanyl use  - last use prior to admission yesterday     History of endocarditis  - history of MSSA bacteremia and TV endocarditis 09/2020  -s/p TV repair  -ECHO 7/16, no vegetation  -on chronic Eliquis    Bipolar d/o  -continue psychiatric medications    History of Present Illness     HPI:  Ana Landry is a 31 y o  female  With past medical history of IV drug abuse, endocarditis status post tricuspid valve repair, bipolar disorder who presented to the emergency department yesterday with complaints of generalized pain throughout her body    Patient was recently admitted at [facility] from 07/11 to 7/18 due to MRSA bacteremia and found to have multiple skin abscesses related to her IVDA  Patient was seen by infectious disease who recommended a 6 week course of IV vancomycin  However patient left AMA prior to being treated  She reported to [facility] on 07/19 but then also left there AMA  Patient had a left forearm abscess drained on previous admission  She had a right ankle abscess at that time that she refused I&D for  Patient's left arm abscess has improved  She continues with left ankle abscess with significant pain and cellulitis  She refused I&D of this abscess again in the emergency department yesterday  We are consulted for I&D procedure  Patient's last drug use was yesterday morning prior to admission  She has not been compliant with her medications  She states she is homeless  Inpatient consult to Acute Care Surgery  Consult performed by: Luis Manuel Chavez PA-C  Consult ordered by: Brittney Salmeron PA-C          Review of Systems   Constitutional: Positive for chills  Negative for appetite change and unexpected weight change  HENT: Negative  Eyes: Negative  Respiratory: Negative  Negative for cough and shortness of breath  Cardiovascular: Negative  Negative for chest pain and palpitations  Gastrointestinal: Negative  Negative for abdominal pain, nausea and vomiting  Endocrine: Negative  Genitourinary: Negative  Negative for difficulty urinating  Musculoskeletal: Positive for arthralgias  Skin: Positive for wound  Allergic/Immunologic: Negative  Neurological: Negative  Hematological: Negative  Does not bruise/bleed easily  Psychiatric/Behavioral: Positive for agitation  All other systems reviewed and are negative        Historical Information   Past Medical History:   Diagnosis Date    Abnormal Pap smear of cervix     Anxiety     Depression     Endocarditis     2018    Hepatitis C     HPV (human papilloma virus) anogenital infection Past Surgical History:   Procedure Laterality Date    INDUCED       IR PICC PLACEMENT DOUBLE LUMEN  2020    IR PICC PLACEMENT DOUBLE LUMEN  2021    KNEE SURGERY Left     MOUTH SURGERY      ME REPLACE TRICUSPID W CP BYPASS N/A 9/10/2020    Procedure: REPAIR VALVE TRICUSPID with [employer] 30mm 3D Contour  Annuloplasty Ring;  Surgeon: Luz Jenkins MD;  Location: [facility];  Service: Cardiac Surgery    TOOTH EXTRACTION N/A 2020    Procedure: EXTRACTION TOOTH 32;  Surgeon: Soledad Sullivan DMD;  Location: [facility];  Service: Maxillofacial     Social History   Social History     Substance and Sexual Activity   Alcohol Use Not Currently    Alcohol/week: 0 0 standard drinks     Social History     Substance and Sexual Activity   Drug Use Yes    Types: Cocaine, Fentanyl    Comment: injected cocaine last night         E-Cigarette/Vaping    E-Cigarette Use Current Every Day User      E-Cigarette/Vaping Substances    Nicotine Yes      Social History     Tobacco Use   Smoking Status Current Every Day Smoker    Packs/day: 0 50    Types: Cigarettes    Last attempt to quit: 2016    Years since quittin 6   Smokeless Tobacco Never Used     Family History: non-contributory    Meds/Allergies   all current active meds have been reviewed  Allergies   Allergen Reactions    Cat Hair Extract Itching    Dog Epithelium     Latex     Pollen Extract        Objective   First Vitals:   Blood Pressure: (!) 173/95 (21 1544)  Pulse: (!) 156 (21 154)  Temperature: 100 °F (37 8 °C) (21 154)  Respirations: 18 (21 154)  Height: 5' 5" (165 1 cm) (21 154)  Weight - Scale: 60 8 kg (134 lb) (21 154)  SpO2: 96 % (21 154)    Current Vitals:   Blood Pressure: 112/57 (21)  Pulse: 78 (21)  Temperature: 97 9 °F (36 6 °C) (21)  Respirations: 18 (21)  Height: 5' 5" (165 1 cm) (21)  Weight - Scale: 60 8 kg (134 lb) (07/20/21 1542)  SpO2: 97 % (07/20/21 2204)      Intake/Output Summary (Last 24 hours) at 7/21/2021 1039  Last data filed at 7/20/2021 2201  Gross per 24 hour   Intake 1530 ml   Output --   Net 1530 ml       Invasive Devices     Peripherally Inserted Central Catheter Line            PICC Line 07/20/21 <1 day                Physical Exam  Constitutional:       General: She is not in acute distress  Appearance: She is well-developed  She is not diaphoretic  Comments: anxious   HENT:      Head: Normocephalic and atraumatic  Mouth/Throat:      Pharynx: No oropharyngeal exudate  Eyes:      General: No scleral icterus  Right eye: No discharge  Left eye: No discharge  Neck:      Thyroid: No thyromegaly  Vascular: No JVD  Trachea: No tracheal deviation  Comments: Trachea midline  Cardiovascular:      Rate and Rhythm: Normal rate and regular rhythm  Heart sounds: Normal heart sounds  No murmur heard  Pulmonary:      Effort: Pulmonary effort is normal  No respiratory distress  Breath sounds: Normal breath sounds  No wheezing  Abdominal:      General: Bowel sounds are normal  There is no distension  Palpations: Abdomen is soft  Tenderness: There is no abdominal tenderness  Musculoskeletal:         General: No deformity  Normal range of motion  Cervical back: Normal range of motion and neck supple  Skin:     General: Skin is warm and dry  Findings: No rash  Comments: Healing incision left forearm with mild erythema no signs of ongoing infection or underlying abscess    Right ankle with approximate 2 cm abscess over lateral malleolus,  Tender without drainage,  Surrounding erythema  Extending to shin   Neurological:      Mental Status: She is alert and oriented to person, place, and time        Comments: No focal deficits   Psychiatric:      Comments: Anxious and aggitated         Lab Results:   I have personally reviewed pertinent lab results  , CBC:   Lab Results   Component Value Date    WBC 5 78 07/21/2021    HGB 8 8 (L) 07/21/2021    HCT 28 5 (L) 07/21/2021    MCV 90 07/21/2021     07/21/2021    MCH 27 8 07/21/2021    MCHC 30 9 (L) 07/21/2021    RDW 12 5 07/21/2021    MPV 8 5 (L) 07/21/2021    NRBC 0 07/21/2021   , CMP:   Lab Results   Component Value Date    SODIUM 140 07/21/2021    K 3 8 07/21/2021     07/21/2021    CO2 29 07/21/2021    BUN 13 07/21/2021    CREATININE 0 70 07/21/2021    CALCIUM 8 3 07/21/2021    AST 25 07/21/2021    ALT 16 07/21/2021    ALKPHOS 106 07/21/2021    EGFR 118 07/21/2021   , Coagulation:   Lab Results   Component Value Date    INR 1 09 07/20/2021   , Urinalysis: No results found for: Lyn Dimes, SPECGRAV, PHUR, LEUKOCYTESUR, NITRITE, PROTEINUA, GLUCOSEU, KETONESU, BILIRUBINUR, BLOODU, Amylase: No results found for: AMYLASE, Lipase: No results found for: LIPASE  Imaging: I have personally reviewed pertinent reports  EKG, Pathology, and Other Studies: I have personally reviewed pertinent reports        Leatha Drummond PA-C

## 2021-07-22 LAB
ANION GAP SERPL CALCULATED.3IONS-SCNC: 6 MMOL/L (ref 4–13)
APTT PPP: 38 SECONDS (ref 23–37)
BASOPHILS # BLD AUTO: 0.03 THOUSANDS/ΜL (ref 0–0.1)
BASOPHILS NFR BLD AUTO: 1 % (ref 0–1)
BUN SERPL-MCNC: 14 MG/DL (ref 5–25)
CALCIUM SERPL-MCNC: 8.1 MG/DL (ref 8.3–10.1)
CHLORIDE SERPL-SCNC: 105 MMOL/L (ref 100–108)
CO2 SERPL-SCNC: 30 MMOL/L (ref 21–32)
CREAT SERPL-MCNC: 0.72 MG/DL (ref 0.6–1.3)
EOSINOPHIL # BLD AUTO: 0.33 THOUSAND/ΜL (ref 0–0.61)
EOSINOPHIL NFR BLD AUTO: 5 % (ref 0–6)
ERYTHROCYTE [DISTWIDTH] IN BLOOD BY AUTOMATED COUNT: 12.4 % (ref 11.6–15.1)
GFR SERPL CREATININE-BSD FRML MDRD: 114 ML/MIN/1.73SQ M
GLUCOSE SERPL-MCNC: 114 MG/DL (ref 65–140)
HCT VFR BLD AUTO: 31.3 % (ref 34.8–46.1)
HGB BLD-MCNC: 9.8 G/DL (ref 11.5–15.4)
IMM GRANULOCYTES # BLD AUTO: 0.04 THOUSAND/UL (ref 0–0.2)
IMM GRANULOCYTES NFR BLD AUTO: 1 % (ref 0–2)
INR PPP: 1.14 (ref 0.84–1.19)
LYMPHOCYTES # BLD AUTO: 2.54 THOUSANDS/ΜL (ref 0.6–4.47)
LYMPHOCYTES NFR BLD AUTO: 38 % (ref 14–44)
MCH RBC QN AUTO: 28.2 PG (ref 26.8–34.3)
MCHC RBC AUTO-ENTMCNC: 31.3 G/DL (ref 31.4–37.4)
MCV RBC AUTO: 90 FL (ref 82–98)
MONOCYTES # BLD AUTO: 0.73 THOUSAND/ΜL (ref 0.17–1.22)
MONOCYTES NFR BLD AUTO: 11 % (ref 4–12)
NEUTROPHILS # BLD AUTO: 2.99 THOUSANDS/ΜL (ref 1.85–7.62)
NEUTS SEG NFR BLD AUTO: 44 % (ref 43–75)
NRBC BLD AUTO-RTO: 0 /100 WBCS
PLATELET # BLD AUTO: 380 THOUSANDS/UL (ref 149–390)
PMV BLD AUTO: 8.3 FL (ref 8.9–12.7)
POTASSIUM SERPL-SCNC: 4.1 MMOL/L (ref 3.5–5.3)
PROTHROMBIN TIME: 14.7 SECONDS (ref 11.6–14.5)
RBC # BLD AUTO: 3.47 MILLION/UL (ref 3.81–5.12)
SODIUM SERPL-SCNC: 141 MMOL/L (ref 136–145)
VANCOMYCIN TROUGH SERPL-MCNC: 16.6 UG/ML (ref 10–20)
WBC # BLD AUTO: 6.66 THOUSAND/UL (ref 4.31–10.16)

## 2021-07-22 PROCEDURE — 85025 COMPLETE CBC W/AUTO DIFF WBC: CPT | Performed by: INTERNAL MEDICINE

## 2021-07-22 PROCEDURE — 99232 SBSQ HOSP IP/OBS MODERATE 35: CPT | Performed by: INTERNAL MEDICINE

## 2021-07-22 PROCEDURE — 85610 PROTHROMBIN TIME: CPT | Performed by: INTERNAL MEDICINE

## 2021-07-22 PROCEDURE — 80048 BASIC METABOLIC PNL TOTAL CA: CPT | Performed by: INTERNAL MEDICINE

## 2021-07-22 PROCEDURE — 80202 ASSAY OF VANCOMYCIN: CPT | Performed by: INTERNAL MEDICINE

## 2021-07-22 PROCEDURE — 85730 THROMBOPLASTIN TIME PARTIAL: CPT | Performed by: INTERNAL MEDICINE

## 2021-07-22 RX ORDER — HEPARIN SODIUM 1000 [USP'U]/ML
1800 INJECTION, SOLUTION INTRAVENOUS; SUBCUTANEOUS
Status: DISCONTINUED | OUTPATIENT
Start: 2021-07-22 | End: 2021-07-23 | Stop reason: HOSPADM

## 2021-07-22 RX ORDER — HEPARIN SODIUM 1000 [USP'U]/ML
3600 INJECTION, SOLUTION INTRAVENOUS; SUBCUTANEOUS
Status: DISCONTINUED | OUTPATIENT
Start: 2021-07-22 | End: 2021-07-23 | Stop reason: HOSPADM

## 2021-07-22 RX ORDER — HEPARIN SODIUM 10000 [USP'U]/100ML
3-20 INJECTION, SOLUTION INTRAVENOUS
Status: DISCONTINUED | OUTPATIENT
Start: 2021-07-22 | End: 2021-07-23 | Stop reason: HOSPADM

## 2021-07-22 RX ORDER — HEPARIN SODIUM 10000 [USP'U]/100ML
3-20 INJECTION, SOLUTION INTRAVENOUS
Status: DISCONTINUED | OUTPATIENT
Start: 2021-07-22 | End: 2021-07-22

## 2021-07-22 RX ADMIN — ACETAMINOPHEN 975 MG: 325 TABLET, FILM COATED ORAL at 05:47

## 2021-07-22 RX ADMIN — ACETAMINOPHEN 975 MG: 325 TABLET, FILM COATED ORAL at 12:27

## 2021-07-22 RX ADMIN — HYDROMORPHONE HYDROCHLORIDE 0.5 MG: 1 INJECTION, SOLUTION INTRAMUSCULAR; INTRAVENOUS; SUBCUTANEOUS at 07:44

## 2021-07-22 RX ADMIN — HYDROMORPHONE HYDROCHLORIDE 0.5 MG: 1 INJECTION, SOLUTION INTRAMUSCULAR; INTRAVENOUS; SUBCUTANEOUS at 00:33

## 2021-07-22 RX ADMIN — OXYCODONE HYDROCHLORIDE 15 MG: 5 TABLET ORAL at 12:26

## 2021-07-22 RX ADMIN — VANCOMYCIN HYDROCHLORIDE 1000 MG: 1 INJECTION, SOLUTION INTRAVENOUS at 00:33

## 2021-07-22 RX ADMIN — GABAPENTIN 300 MG: 300 CAPSULE ORAL at 17:35

## 2021-07-22 RX ADMIN — VANCOMYCIN HYDROCHLORIDE 1000 MG: 1 INJECTION, SOLUTION INTRAVENOUS at 08:33

## 2021-07-22 RX ADMIN — HEPARIN SODIUM 12 UNITS/KG/HR: 10000 INJECTION, SOLUTION INTRAVENOUS at 21:47

## 2021-07-22 RX ADMIN — OXYCODONE HYDROCHLORIDE 15 MG: 5 TABLET ORAL at 05:47

## 2021-07-22 RX ADMIN — OXYCODONE HYDROCHLORIDE 15 MG: 5 TABLET ORAL at 20:09

## 2021-07-22 RX ADMIN — QUETIAPINE FUMARATE 50 MG: 25 TABLET ORAL at 21:40

## 2021-07-22 RX ADMIN — ARIPIPRAZOLE 10 MG: 5 TABLET ORAL at 08:34

## 2021-07-22 RX ADMIN — HYDROMORPHONE HYDROCHLORIDE 0.5 MG: 1 INJECTION, SOLUTION INTRAMUSCULAR; INTRAVENOUS; SUBCUTANEOUS at 21:41

## 2021-07-22 RX ADMIN — VANCOMYCIN HYDROCHLORIDE 1000 MG: 1 INJECTION, SOLUTION INTRAVENOUS at 17:35

## 2021-07-22 RX ADMIN — HYDROMORPHONE HYDROCHLORIDE 0.5 MG: 1 INJECTION, SOLUTION INTRAMUSCULAR; INTRAVENOUS; SUBCUTANEOUS at 15:20

## 2021-07-22 RX ADMIN — APIXABAN 5 MG: 5 TABLET, FILM COATED ORAL at 17:35

## 2021-07-22 RX ADMIN — APIXABAN 5 MG: 5 TABLET, FILM COATED ORAL at 08:34

## 2021-07-22 RX ADMIN — ACETAMINOPHEN 975 MG: 325 TABLET, FILM COATED ORAL at 20:09

## 2021-07-22 RX ADMIN — CLONAZEPAM 0.5 MG: 0.5 TABLET ORAL at 08:35

## 2021-07-22 RX ADMIN — CLONAZEPAM 0.5 MG: 0.5 TABLET ORAL at 17:35

## 2021-07-22 RX ADMIN — GABAPENTIN 300 MG: 300 CAPSULE ORAL at 08:35

## 2021-07-22 NOTE — ASSESSMENT & PLAN NOTE
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

## 2021-07-22 NOTE — ASSESSMENT & PLAN NOTE
· Presents to the ED due to ongoing pain throughout her body, worse in her back and right lower leg  · Patient is known to have MRSA bacteremia that had been in the process of being treated at [facility] but patient left AMA  · Pain management as ordered

## 2021-07-22 NOTE — PROGRESS NOTES
[place]  Progress Note Cat Acosta 1990, 31 y o  female MRN: [id number]  Unit/Bed#: -01 Encounter: [id number]  Primary Care Provider: No primary care provider on file  Date and time admitted to hospital: 7/20/2021  3:38 PM    Sepsis [facility])  Assessment & Plan  sepsis present on admission as evidenced by tachycardia, leukocytosis, due to known MRSA bacteremia from cellulitis and abscess formation involving the left forearm and right ankle  Lactic normal, UA pending, procalcitonin is negative x2  Blood cultures without growth from 07/21/2021  Wound cultures collected on 07/18/2021, resulting 07/21/2021 with MRSA  Continue with IV vancomycin #3  PICC line placed 07/20/2021      * MRSA bacteremia  Assessment & Plan  Source is likely cellulitis/abscess  [facility]ially [facility] from 07/11 through 7/18  History of IV drug use  Initially had a positive blood culture growing MRSA on 7/11  Repeat blood cultures performed on 07/13 with no growth  TTE 07/16/2021: no vegetation noted  PICC line placed on 07/20, continue IV vancomycin  Complaint of back pain, check CT T/L with contrast   Consult infectious disease    Abscess  Assessment & Plan  · Abscess formation on right lateral ankle and left forearm   · Consult general surgery, patient refused drainage      History of endocarditis  Assessment & Plan  · 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  · Echo performed on 07/16, without vegetations    Generalized pain  Assessment & Plan  · Presents to the ED due to ongoing pain throughout her body, worse in her back and right lower leg  · Patient is known to have MRSA bacteremia that had been in the process of being treated at [facility] but patient left AMA  · Pain management as ordered        Cellulitis of lower extremity  Assessment & Plan  · Cellulitis of her right lower extremity     · Continue with vancomycin    Chronic anticoagulation  Assessment & Plan  · Continue Eliquis 5 mg b i d  Intravenous drug abuse ([address] )  Assessment & Plan  · Admits to using intravenous cocaine and fentanyl use  Last used cocaine today  · Monitor closely for withdrawal   Continue clonidine 0 1 b i d , can add gabapentin if needed  · Patient also reports that she is homeless due to not being welcome at her house due to drug abuse  · Consult case management    Bipolar 1 disorder (HCC)  Assessment & Plan  · Continue Abilify 10 mg daily, Klonopin 0 5 mg b i d, Seroquel 50 mg at night    Septic embolism (HCC)  Assessment & Plan  · During admission at  be patient found to have small scattered peripheral glass home glass opacities within the bilateral lung bases due to pneumonia versus septic emboli  · COVID negative on   COVID test pending  · Denies respiratory complaints at this time  95% on room air        VTE Pharmacologic Prophylaxis: VTE Score: 2 Moderate Risk (Score 3-4) - Pharmacological DVT Prophylaxis Ordered: apixaban (Eliquis)  Patient Centered Rounds: I performed bedside rounds with nursing staff today  Discussions with Specialists or Other Care Team Provider:     Education and Discussions with Family / Patient: Patient declined call to   Time Spent for Care: 30 minutes  More than 50% of total time spent on counseling and coordination of care as described above  Current Length of Stay: 2 day(s)  Current Patient Status: Inpatient   Certification Statement: The patient will continue to require additional inpatient hospital stay due to IV antibiotics for MRSA  Discharge Plan: Anticipate discharge in >72 hrs to   Code Status: Level 1 - Full Code    Subjective:   Seen  Complains of generalized body aches, back pain, sleepy    No other complaints to offer    Objective:     Vitals:   Temp (24hrs), Av 4 °F (36 9 °C), Min:98 4 °F (36 9 °C), Max:98 4 °F (36 9 °C)    Temp:  [98 4 °F (36 9 °C)] 98 4 °F (36 9 °C)  HR: [79] 79  Resp:  [18] 18  BP: (108-129)/(64-69) 108/64  SpO2:  [96 %] 96 %  Body mass index is 22 3 kg/m²  Input and Output Summary (last 24 hours):   No intake or output data in the 24 hours ending 07/22/21 1015    Physical Exam:   Physical Exam  Vitals and nursing note reviewed  Constitutional:       General: She is not in acute distress  Appearance: She is well-developed  HENT:      Head: Normocephalic and atraumatic  Eyes:      Conjunctiva/sclera: Conjunctivae normal    Cardiovascular:      Rate and Rhythm: Normal rate and regular rhythm  Heart sounds: Murmur heard  Pulmonary:      Effort: Pulmonary effort is normal  No respiratory distress  Breath sounds: Normal breath sounds  Abdominal:      Palpations: Abdomen is soft  Tenderness: There is no abdominal tenderness  Musculoskeletal:      Cervical back: Neck supple  Right lower leg: No edema  Left lower leg: No edema  Comments: Right ankle wound noted, slightly erythematous, no drainage, tender to touch   Skin:     General: Skin is warm and dry  Neurological:      General: No focal deficit present  Mental Status: She is alert and oriented to person, place, and time  Mental status is at baseline  Cranial Nerves: No cranial nerve deficit  Motor: No weakness            Additional Data:     Labs:  Results from last 7 days   Lab Units 07/22/21  0552   WBC Thousand/uL 6 66   HEMOGLOBIN g/dL 9 8*   HEMATOCRIT % 31 3*   PLATELETS Thousands/uL 380   NEUTROS PCT % 44   LYMPHS PCT % 38   MONOS PCT % 11   EOS PCT % 5     Results from last 7 days   Lab Units 07/22/21  0552 07/21/21  0457   SODIUM mmol/L 141 140   POTASSIUM mmol/L 4 1 3 8   CHLORIDE mmol/L 105 104   CO2 mmol/L 30 29   BUN mg/dL 14 13   CREATININE mg/dL 0 72 0 70   ANION GAP mmol/L 6 7   CALCIUM mg/dL 8 1* 8 3   ALBUMIN g/dL  --  2 3*   TOTAL BILIRUBIN mg/dL  --  0 20   ALK PHOS U/L  --  106   ALT U/L  --  16   AST U/L  --  25   GLUCOSE RANDOM mg/dL 114 89     Results from last 7 days   Lab Units 07/20/21  1724   INR  1 09     Results from last 7 days   Lab Units 07/21/21  1148 07/21/21  0820   POC GLUCOSE mg/dl 134 96         Results from last 7 days   Lab Units 07/21/21  0457 07/20/21  1724   LACTIC ACID mmol/L  --  1 7   PROCALCITONIN ng/ml <0 05 <0 05       Lines/Drains:  Invasive Devices     Peripherally Inserted Central Catheter Line            PICC Line 07/20/21 1 day                Central Line:  Goal for removal: Will discontinue when meds requiring line are completed  Imaging: No pertinent imaging reviewed  Recent Cultures (last 7 days):   Results from last 7 days   Lab Units 07/20/21  1724 07/20/21  0250 07/19/21  2049 07/18/21  1720   BLOOD CULTURE  No Growth at 24 hrs  No Growth at 24 hrs  No Growth at 48 hrs   No Growth at 48 hrs   --    GRAM STAIN RESULT   --   --   --  4+ Polys*  1+ Gram positive cocci in pairs, chains and clusters*   WOUND CULTURE   --   --   --  1+ Growth of Methicillin Resistant Staphylococcus aureus*       Last 24 Hours Medication List:   Current Facility-Administered Medications   Medication Dose Route Frequency Provider Last Rate    acetaminophen  975 mg Oral Q8H Alana Johnston, PA-NALLELY      apixaban  5 mg Oral BID Luwanna Gains, PA-C      ARIPiprazole  10 mg Oral Daily Luwanna Gains, PA-C      clonazePAM  0 5 mg Oral BID Luwanna Gains, PA-C      cloNIDine  0 1 mg Oral Q12H [facility] Luwanna Gains, PA-C      gabapentin  300 mg Oral BID Luwanna Gains, PA-C      HYDROmorphone  0 5 mg Intravenous Q4H PRN Luwanna Gains, PA-C      hydrOXYzine HCL  25 mg Oral Q6H PRN Luwanna Gains, PA-C      lidocaine  1 patch Topical Daily Luwanna Gains, PA-C      lidocaine 1% buffered  20 mL Infiltration Once Aureliano Sakshi Hernandez, PA-C      methocarbamol  750 mg Oral Q6H PRN Luwanna Gains, PA-C      ondansetron  4 mg Intravenous Q6H PRN Luwanna Gains, PA-C      oxyCODONE  10 mg Oral Q6H PRN Luwanna Gains, PA-C      oxyCODONE  15 mg Oral Q6H PRN Mari Fleischer MILLIE Johnston      QUEtiapine  50 mg Oral HS Nadja Magdaleno PA-C      vancomycin  15 mg/kg Intravenous Q8H Alana Johnston PA-C 1,000 mg (07/22/21 7674)        Today, Patient Was Seen By: Haydee Sanford MD    **Please Note: This note may have been constructed using a voice recognition system  **

## 2021-07-22 NOTE — PLAN OF CARE
Problem: Potential for Falls  Goal: Patient will remain free of falls  Description: INTERVENTIONS:  - Educate patient/family on patient safety including physical limitations  - Instruct patient to call for assistance with activity   - Consult OT/PT to assist with strengthening/mobility   - Keep Call bell within reach  - Keep bed low and locked with side rails adjusted as appropriate  - Keep care items and personal belongings within reach  - Initiate and maintain comfort rounds  - Make Fall Risk Sign visible to staff  - Offer Toileting every 2Hours, in advance of need  - Initiate/Maintain 2alarm  - Obtain necessary fall risk management equipment: 2  - Apply yellow socks and bracelet for high fall risk patients  - Consider moving patient to room near nurses station  Outcome: Progressing     Problem: PAIN - ADULT  Goal: Verbalizes/displays adequate comfort level or baseline comfort level  Description: Interventions:  - Encourage patient to monitor pain and request assistance  - Assess pain using appropriate pain scale  - Administer analgesics based on type and severity of pain and evaluate response  - Implement non-pharmacological measures as appropriate and evaluate response  - Consider cultural and social influences on pain and pain management  - Notify physician/advanced practitioner if interventions unsuccessful or patient reports new pain  Outcome: Progressing     Problem: INFECTION - ADULT  Goal: Absence or prevention of progression during hospitalization  Description: INTERVENTIONS:  - Assess and monitor for signs and symptoms of infection  - Monitor lab/diagnostic results  - Monitor all insertion sites, i e  indwelling lines, tubes, and drains  - Monitor endotracheal if appropriate and nasal secretions for changes in amount and color  - [place] appropriate cooling/warming therapies per order  - Administer medications as ordered  - Instruct and encourage patient and family to use good hand hygiene technique  - Identify and instruct in appropriate isolation precautions for identified infection/condition  Outcome: Progressing  Goal: Absence of fever/infection during neutropenic period  Description: INTERVENTIONS:  - Monitor WBC    Outcome: Progressing     Problem: SAFETY ADULT  Goal: Patient will remain free of falls  Description: INTERVENTIONS:  - Educate patient/family on patient safety including physical limitations  - Instruct patient to call for assistance with activity   - Consult OT/PT to assist with strengthening/mobility   - Keep Call bell within reach  - Keep bed low and locked with side rails adjusted as appropriate  - Keep care items and personal belongings within reach  - Initiate and maintain comfort rounds  - Make Fall Risk Sign visible to staff  - Offer Toileting every 2 Hours, in advance of need  - Initiate/Maintain 2alarm  - Obtain necessary fall risk management equipment: 22  - Apply yellow socks and bracelet for high fall risk patients  - Consider moving patient to room near nurses station  Outcome: Progressing  Goal: Maintain or return to baseline ADL function  Description: INTERVENTIONS:  -  Assess patient's ability to carry out ADLs; assess patient's baseline for ADL function and identify physical deficits which impact ability to perform ADLs (bathing, care of mouth/teeth, toileting, grooming, dressing, etc )  - Assess/evaluate cause of self-care deficits   - Assess range of motion  - Assess patient's mobility; develop plan if impaired  - Assess patient's need for assistive devices and provide as appropriate  - Encourage maximum independence but intervene and supervise when necessary  - Involve family in performance of ADLs  - Assess for home care needs following discharge   - Consider OT consult to assist with ADL evaluation and planning for discharge  - Provide patient education as appropriate  Outcome: Progressing  Goal: Maintains/Returns to pre admission functional level  Description: INTERVENTIONS:  - Perform BMAT or MOVE assessment daily    - Set and communicate daily mobility goal to care team and patient/family/caregiver  - Collaborate with rehabilitation services on mobility goals if consulted  - Perform Range of Motion 2 times a day  - Reposition patient every 2 hours  - Dangle patient 2 times a day  - Stand patient 2 times a day  - Ambulate patient 2 times a day  - Out of bed to chair 2 times a day   - Out of bed for meals 2 times a day  - Out of bed for toileting  - Record patient progress and toleration of activity level   Outcome: Progressing     Problem: DISCHARGE PLANNING  Goal: Discharge to home or other facility with appropriate resources  Description: INTERVENTIONS:  - Identify barriers to discharge w/patient and caregiver  - Arrange for needed discharge resources and transportation as appropriate  - Identify discharge learning needs (meds, wound care, etc )  - Arrange for interpretive services to assist at discharge as needed  - Refer to Case Management Department for coordinating discharge planning if the patient needs post-hospital services based on physician/advanced practitioner order or complex needs related to functional status, cognitive ability, or social support system  Outcome: Progressing     Problem: Knowledge Deficit  Goal: Patient/family/caregiver demonstrates understanding of disease process, treatment plan, medications, and discharge instructions  Description: Complete learning assessment and assess knowledge base  Interventions:  - Provide teaching at level of understanding  - Provide teaching via preferred learning methods  Outcome: Progressing     Problem: Nutrition/Hydration-ADULT  Goal: Nutrient/Hydration intake appropriate for improving, restoring or maintaining nutritional needs  Description: Monitor and assess patient's nutrition/hydration status for malnutrition  Collaborate with interdisciplinary team and initiate plan and interventions as ordered  Monitor patient's weight and dietary intake as ordered or per policy  Utilize nutrition screening tool and intervene as necessary  Determine patient's food preferences and provide high-protein, high-caloric foods as appropriate       INTERVENTIONS:  - Monitor oral intake, urinary output, labs, and treatment plans  - Assess nutrition and hydration status and recommend course of action  - Evaluate amount of meals eaten  - Assist patient with eating if necessary   - Allow adequate time for meals  - Recommend/ encourage appropriate diets, oral nutritional supplements, and vitamin/mineral supplements  - Order, calculate, and assess calorie counts as needed  - Recommend, monitor, and adjust tube feedings and TPN/PPN based on assessed needs  - Assess need for intravenous fluids  - Provide specific nutrition/hydration education as appropriate  - Include patient/family/caregiver in decisions related to nutrition  Outcome: Progressing     Problem: Prexisting or High Potential for Compromised Skin Integrity  Goal: Skin integrity is maintained or improved  Description: INTERVENTIONS:  - Identify patients at risk for skin breakdown  - Assess and monitor skin integrity  - Assess and monitor nutrition and hydration status  - Monitor labs   - Assess for incontinence   - Turn and reposition patient  - Assist with mobility/ambulation  - Relieve pressure over bony prominences  - Avoid friction and shearing  - Provide appropriate hygiene as needed including keeping skin clean and dry  - Evaluate need for skin moisturizer/barrier cream  - Collaborate with interdisciplinary team   - Patient/family teaching  - Consider wound care consult   Outcome: Progressing

## 2021-07-22 NOTE — CONSULTS
Consult - Chuy Dwyer 31 y o  female MRN: [id number]  Unit/Bed#: -01 Encounter: [id number]    Assessment/Plan     Abscess lateral right ankle   -recommend bedside incision and drainage, patient refused   -stop Eliquis, start heparin for possible I&D on Monday in OR with sedation  Sepsis/MRSA bacteremia   -IV vanco with PICC line  History of endocarditis   -hx tricuspid valve repair   -on Eliquis chronically  History IV drug abuse/Hep C   -last used IV cocaine and fat no prior to admission 2 days ago        History of Present Illness     HPI:  Jozef Waters is a 31 y o  female who presents with abscess right lateral ankle secondary to IV drug use injection site infection  Podiatry consult requested to evaluate abscess since she refused to allow general surgery to perform incision and drainage at bedside  Patient's anxiety precludes performing bedside procedure, barely tolerated physical exam today  Patient's chart reviewed noting all pertinent clinical laboratory data  Inpatient consult to Podiatry  Consult performed by: Cesar Skinner DPM  Consult ordered by: Norberto Chisholm MD        Review of Systems   Constitutional: Negative  HENT: Negative  Eyes: Negative  Respiratory:  Patient reports lung congestion  Cardiovascular:  History endocarditis on long-term anticoagulation   Gastrointestinal: Negative      Musculoskeletal:  Painful right ankle   Skin:  Skin abscess noted right ankle   Neurological:  Negative        Historical Information   Past Medical History:   Diagnosis Date    Abnormal Pap smear of cervix     Anxiety     Depression     Endocarditis     2018    Hepatitis C     HPV (human papilloma virus) anogenital infection      Past Surgical History:   Procedure Laterality Date    INDUCED       IR PICC PLACEMENT DOUBLE LUMEN  2020    IR PICC PLACEMENT DOUBLE LUMEN  2021    KNEE SURGERY Left     MOUTH SURGERY      DC REPLACE TRICUSPID W CP BYPASS N/A 9/10/2020    Procedure: REPAIR VALVE TRICUSPID with Medtronic 30mm 3D Contour  Annuloplasty Ring;  Surgeon: Michaela Lefort, MD;  [facility];  Service: Cardiac Surgery    TOOTH EXTRACTION N/A 2020    Procedure: EXTRACTION TOOTH 32;  Surgeon: Willette Kehr, DMD;  Location: [facility];  Service: Maxillofacial     Social History   Social History     Substance and Sexual Activity   Alcohol Use Not Currently    Alcohol/week: 0 0 standard drinks     Social History     Substance and Sexual Activity   Drug Use Yes    Types: Cocaine, Fentanyl    Comment: injected cocaine last night  Social History     Tobacco Use   Smoking Status Current Every Day Smoker    Packs/day: 0 50    Types: Cigarettes    Last attempt to quit: 2016    Years since quittin 7   Smokeless Tobacco Never Used     Family History: History reviewed  No pertinent family history      Meds/Allergies   Medications Prior to Admission   Medication    QUEtiapine (SEROquel) 50 mg tablet    apixaban (ELIQUIS) 5 mg    ARIPiprazole (ABILIFY) 10 mg tablet    clonazePAM (KlonoPIN) 0 5 mg tablet    gabapentin (NEURONTIN) 300 mg capsule    prazosin (MINIPRESS) 1 mg capsule     Allergies   Allergen Reactions    Cat Hair Extract Itching    Dog Epithelium     Latex     Pollen Extract        Objective   First Vitals:   Blood Pressure: (!) 173/95 (21 1544)  Pulse: (!) 156 (21 1544)  Temperature: 100 °F (37 8 °C) (21 1542)  Respirations: 18 (21 1544)  Height: 5' 5" (165 1 cm) (21 1542)  Weight - Scale: 60 8 kg (134 lb) (21 1542)  SpO2: 96 % (21 1544)    Current Vitals:   Blood Pressure: 108/64 (21 230)  Pulse: 79 (21 230)  Temperature: 98 4 °F (36 9 °C) (21 230)  Respirations: 18 (21 230)  Height: 5' 5" (165 1 cm) (21 1542)  Weight - Scale: 60 8 kg (134 lb) (21 1542)  SpO2: 96 % (21 230)        /64   Pulse 79   Temp 98 4 °F (36 9 °C)   Resp 18   Ht 5' 5" (1 651 m)   Wt 60 8 kg (134 lb)   SpO2 96%   BMI 22 30 kg/m²     General Appearance:    Alert, cooperative, extremely anxious, seated at bedside   Head:    Normocephalic, without obvious abnormality, atraumatic   Eyes:    PERRL, conjunctiva/corneas clear, EOM's intact            Nose:   Moist mucous membranes, no drainage or sinus tenderness   Throat:   No tenderness, no exudates   Neck:   Supple, symmetrical, trachea midline, no JVD   Back:     Symmetric, no CVA tenderness   Lungs:     Respirations unlabored   Chest wall:    No tenderness or deformity   Heart:    Rate and rhythm noted on last vitals  Abdomen:     Soft, non-tender, bowel sounds active all four quadrants,     no masses, no organomegaly       Lower Ext/Ortho: Painful right ankle     Neuro/Vasc: CNII-XII intact  Normal strength  Sensation and reflexes:  Grossly intact  Pulses: Right: DP 1/4, PT 1/4, Left: DP to/4, PT 1/4  Capillary Filling:  3 Sec, Edema:  Mild right ankle/leg     Skin: Texture, Tone and Turgor:  Diminished, Digital Hair:  Present  Atrophic Changes:  None   Lesions:  None  Nail Pathology:  Mild dystrophic changes noted  Ulcers/Wounds:  Lateral right ankle overlying malleolus into the retro malleolar sulcus, approximately 3 x 3 fluctuant area consistent with abscess with central white/yellow area which appears ready to drain  Erythema and calor of right ankle noted  Pain with palpation which is disproportionate with what would be expected for this type of presentation  Minimal serosanguineous oozing is noted                   Lab Results:   CBC w/diff  Results from last 7 days   Lab Units 07/22/21  0552   WBC Thousand/uL 6 66   HEMOGLOBIN g/dL 9 8*   HEMATOCRIT % 31 3*   PLATELETS Thousands/uL 380   NEUTROS PCT % 44   LYMPHS PCT % 38   MONOS PCT % 11   EOS PCT % 5     BMP  Results from last 7 days   Lab Units 07/22/21  0552   POTASSIUM mmol/L 4 1   CHLORIDE mmol/L 105   CO2 mmol/L 30   BUN mg/dL 14   CREATININE mg/dL 0 72   CALCIUM mg/dL 8 1*     CMP  Results from last 7 days   Lab Units 07/22/21  0552 07/21/21  0457   POTASSIUM mmol/L 4 1 3 8   CHLORIDE mmol/L 105 104   CO2 mmol/L 30 29   BUN mg/dL 14 13   CREATININE mg/dL 0 72 0 70   CALCIUM mg/dL 8 1* 8 3   ALK PHOS U/L  --  106   ALT U/L  --  16   AST U/L  --  25     @Culture@  Lab Results   Component Value Date    BLOODCX No Growth at 24 hrs  07/20/2021    BLOODCX No Growth at 24 hrs  07/20/2021    BLOODCX No Growth at 48 hrs  07/20/2021    BLOODCX No Growth at 48 hrs  07/19/2021    BLOODCX No Growth After 5 Days  07/13/2021    BLOODCX No Growth After 5 Days  07/13/2021    BLOODCX Methicillin Resistant Staphylococcus aureus (A) 07/11/2021    BLOODCX No Growth After 5 Days  05/26/2021    BLOODCX No Growth After 5 Days  05/26/2021    BLOODCX No Growth After 5 Days  05/20/2021    BLOODCX No Growth After 5 Days  05/20/2021    BLOODCX No Growth After 5 Days  03/17/2021    BLOODCX No Growth After 5 Days  03/17/2021    BLOODCX No Growth After 5 Days  03/13/2021    BLOODCX No Growth After 5 Days  03/13/2021    BLOODCX No Growth After 5 Days  03/11/2021    BLOODCX No Growth After 5 Days  03/11/2021    BLOODCX No Growth After 5 Days  03/10/2021    BLOODCX No Growth After 5 Days  03/10/2021    BLOODCX No Growth After 5 Days  03/10/2021    BLOODCX No Growth After 5 Days  12/26/2020    BLOODCX No Growth After 5 Days  12/26/2020    BLOODCX No Growth After 5 Days  10/24/2020    BLOODCX No Growth After 5 Days  10/24/2020    BLOODCX No Growth After 5 Days  08/26/2020    BLOODCX No Growth After 5 Days  08/15/2020    BLOODCX Staphylococcus aureus (A) 08/13/2020    BLOODCX Staphylococcus aureus (A) 08/10/2020    BLOODCX Staphylococcus aureus (A) 08/10/2020    BLOODCX Staphylococcus coagulase negative (A) 08/10/2020    BLOODCX No Growth After 5 Days  07/21/2020    BLOODCX No Growth After 5 Days  07/21/2020    BLOODCX No Growth After 5 Days   07/18/2020 BLOODCX No Growth After 5 Days  07/18/2020    BLOODCX Staphylococcus aureus (A) 07/15/2020    BLOODCX Staphylococcus aureus (A) 07/15/2020    URINECX 40,000-49,000 cfu/ml  07/12/2021    URINECX >100,000 cfu/ml Escherichia coli (A) 10/24/2020    URINECX 40,000-49,000 cfu/ml  07/15/2020    URINECX No Growth <1000 cfu/mL 11/20/2016     Lab Results   Component Value Date    WOUNDCULT (A) 07/18/2021     1+ Growth of Methicillin Resistant Staphylococcus aureus         Imaging: I have personally reviewed pertinent films in PACS      EKG, Pathology, and Other Studies: I have personally reviewed pertinent reports  Code Status: Level 1 - Full Code  Advance Directive and Living Will:      Power of :      Please Note: Voice to text dictation software was used in the creation of this document         Snehal Quintanilla DPM

## 2021-07-22 NOTE — ASSESSMENT & PLAN NOTE
sepsis present on admission as evidenced by tachycardia, leukocytosis, due to known MRSA bacteremia from cellulitis and abscess formation involving the left forearm and right ankle  Lactic normal, UA pending, procalcitonin is negative x2  Blood cultures without growth from 07/21/2021  Wound cultures collected on 07/18/2021, resulting 07/21/2021 with MRSA  Continue with IV vancomycin #3  PICC line placed 07/20/2021

## 2021-07-22 NOTE — ASSESSMENT & PLAN NOTE
· Abscess formation on right lateral ankle and left forearm   · Consult general surgery, patient refused drainage

## 2021-07-22 NOTE — PROGRESS NOTES
Vancomycin IV Pharmacy-to-Dose Consultation    Mony Chamorro is a 31 y o  female who is currently receiving Vancomycin IV with management by the Pharmacy Consult service  Assessment/Plan:  The patient was reviewed  Renal function is stable and no signs or symptoms of nephrotoxicity and/or infusion reactions were documented in the chart  Based on todays assessment, continue current vancomycin (day # 3) dosing of 1 gram every 8 hours, with a plan for trough to be drawn at 0830 on 07/25/21  We will continue to follow the patients culture results and clinical progress daily      Michelle Shen, Pharmacist PharmD, BCPS

## 2021-07-23 VITALS
DIASTOLIC BLOOD PRESSURE: 70 MMHG | SYSTOLIC BLOOD PRESSURE: 111 MMHG | WEIGHT: 134 LBS | TEMPERATURE: 98 F | HEIGHT: 65 IN | OXYGEN SATURATION: 98 % | RESPIRATION RATE: 18 BRPM | BODY MASS INDEX: 22.33 KG/M2 | HEART RATE: 67 BPM

## 2021-07-23 LAB
ANION GAP SERPL CALCULATED.3IONS-SCNC: 6 MMOL/L (ref 4–13)
APTT PPP: 35 SECONDS (ref 23–37)
APTT PPP: 37 SECONDS (ref 23–37)
ATRIAL RATE: 124 BPM
BASOPHILS # BLD AUTO: 0.02 THOUSANDS/ΜL (ref 0–0.1)
BASOPHILS NFR BLD AUTO: 0 % (ref 0–1)
BUN SERPL-MCNC: 16 MG/DL (ref 5–25)
CALCIUM SERPL-MCNC: 8.5 MG/DL (ref 8.3–10.1)
CHLORIDE SERPL-SCNC: 106 MMOL/L (ref 100–108)
CO2 SERPL-SCNC: 29 MMOL/L (ref 21–32)
CREAT SERPL-MCNC: 0.66 MG/DL (ref 0.6–1.3)
CRP SERPL QL: 22.1 MG/L
EOSINOPHIL # BLD AUTO: 0.34 THOUSAND/ΜL (ref 0–0.61)
EOSINOPHIL NFR BLD AUTO: 5 % (ref 0–6)
ERYTHROCYTE [DISTWIDTH] IN BLOOD BY AUTOMATED COUNT: 12.3 % (ref 11.6–15.1)
GFR SERPL CREATININE-BSD FRML MDRD: 121 ML/MIN/1.73SQ M
GLUCOSE SERPL-MCNC: 91 MG/DL (ref 65–140)
HCT VFR BLD AUTO: 30.9 % (ref 34.8–46.1)
HGB BLD-MCNC: 9.8 G/DL (ref 11.5–15.4)
IMM GRANULOCYTES # BLD AUTO: 0.05 THOUSAND/UL (ref 0–0.2)
IMM GRANULOCYTES NFR BLD AUTO: 1 % (ref 0–2)
LYMPHOCYTES # BLD AUTO: 2.73 THOUSANDS/ΜL (ref 0.6–4.47)
LYMPHOCYTES NFR BLD AUTO: 44 % (ref 14–44)
MCH RBC QN AUTO: 28.2 PG (ref 26.8–34.3)
MCHC RBC AUTO-ENTMCNC: 31.7 G/DL (ref 31.4–37.4)
MCV RBC AUTO: 89 FL (ref 82–98)
MONOCYTES # BLD AUTO: 0.63 THOUSAND/ΜL (ref 0.17–1.22)
MONOCYTES NFR BLD AUTO: 10 % (ref 4–12)
NEUTROPHILS # BLD AUTO: 2.53 THOUSANDS/ΜL (ref 1.85–7.62)
NEUTS SEG NFR BLD AUTO: 40 % (ref 43–75)
NRBC BLD AUTO-RTO: 0 /100 WBCS
P AXIS: 71 DEGREES
PLATELET # BLD AUTO: 365 THOUSANDS/UL (ref 149–390)
PMV BLD AUTO: 8.5 FL (ref 8.9–12.7)
POTASSIUM SERPL-SCNC: 4 MMOL/L (ref 3.5–5.3)
PR INTERVAL: 138 MS
QRS AXIS: 65 DEGREES
QRSD INTERVAL: 78 MS
QT INTERVAL: 320 MS
QTC INTERVAL: 459 MS
RBC # BLD AUTO: 3.48 MILLION/UL (ref 3.81–5.12)
SODIUM SERPL-SCNC: 141 MMOL/L (ref 136–145)
T WAVE AXIS: 60 DEGREES
VENTRICULAR RATE: 124 BPM
WBC # BLD AUTO: 6.3 THOUSAND/UL (ref 4.31–10.16)

## 2021-07-23 PROCEDURE — 86140 C-REACTIVE PROTEIN: CPT | Performed by: INTERNAL MEDICINE

## 2021-07-23 PROCEDURE — 85730 THROMBOPLASTIN TIME PARTIAL: CPT | Performed by: INTERNAL MEDICINE

## 2021-07-23 PROCEDURE — 99238 HOSP IP/OBS DSCHRG MGMT 30/<: CPT | Performed by: PHYSICIAN ASSISTANT

## 2021-07-23 PROCEDURE — 80048 BASIC METABOLIC PNL TOTAL CA: CPT | Performed by: PHYSICIAN ASSISTANT

## 2021-07-23 PROCEDURE — 85730 THROMBOPLASTIN TIME PARTIAL: CPT | Performed by: PHYSICIAN ASSISTANT

## 2021-07-23 PROCEDURE — 93010 ELECTROCARDIOGRAM REPORT: CPT | Performed by: INTERNAL MEDICINE

## 2021-07-23 PROCEDURE — 85025 COMPLETE CBC W/AUTO DIFF WBC: CPT | Performed by: PHYSICIAN ASSISTANT

## 2021-07-23 RX ADMIN — ARIPIPRAZOLE 10 MG: 5 TABLET ORAL at 09:05

## 2021-07-23 RX ADMIN — HYDROMORPHONE HYDROCHLORIDE 0.5 MG: 1 INJECTION, SOLUTION INTRAMUSCULAR; INTRAVENOUS; SUBCUTANEOUS at 11:00

## 2021-07-23 RX ADMIN — LIDOCAINE 5% 1 PATCH: 700 PATCH TOPICAL at 09:04

## 2021-07-23 RX ADMIN — VANCOMYCIN HYDROCHLORIDE 1000 MG: 1 INJECTION, SOLUTION INTRAVENOUS at 01:16

## 2021-07-23 RX ADMIN — OXYCODONE HYDROCHLORIDE 15 MG: 5 TABLET ORAL at 09:05

## 2021-07-23 RX ADMIN — VANCOMYCIN HYDROCHLORIDE 1000 MG: 1 INJECTION, SOLUTION INTRAVENOUS at 09:04

## 2021-07-23 RX ADMIN — HEPARIN SODIUM 3600 UNITS: 1000 INJECTION INTRAVENOUS; SUBCUTANEOUS at 12:58

## 2021-07-23 RX ADMIN — OXYCODONE HYDROCHLORIDE 15 MG: 5 TABLET ORAL at 02:26

## 2021-07-23 RX ADMIN — ACETAMINOPHEN 975 MG: 325 TABLET, FILM COATED ORAL at 04:06

## 2021-07-23 RX ADMIN — ACETAMINOPHEN 975 MG: 325 TABLET, FILM COATED ORAL at 12:53

## 2021-07-23 RX ADMIN — HYDROMORPHONE HYDROCHLORIDE 0.5 MG: 1 INJECTION, SOLUTION INTRAMUSCULAR; INTRAVENOUS; SUBCUTANEOUS at 04:06

## 2021-07-23 RX ADMIN — GABAPENTIN 300 MG: 300 CAPSULE ORAL at 09:05

## 2021-07-23 RX ADMIN — CLONAZEPAM 0.5 MG: 0.5 TABLET ORAL at 09:05

## 2021-07-23 RX ADMIN — HEPARIN SODIUM 3600 UNITS: 1000 INJECTION INTRAVENOUS; SUBCUTANEOUS at 04:45

## 2021-07-23 NOTE — ASSESSMENT & PLAN NOTE
· sepsis present on admission as evidenced by tachycardia, leukocytosis, due to known MRSA bacteremia from cellulitis and abscess formation involving the left forearm and right ankle  · Lactic normal, UA pending, procalcitonin is negative x2  · Blood cultures without growth from 07/21/2021  · Wound cultures collected on 07/18/2021, resulting 07/21/2021 with MRSA  · Continue with IV vancomycin #4, anticipate 6 weeks therapy in total given hx of endocarditis   · PICC line placed 07/20/2021  · Notified per nursing patient requesting to sign out AMA  Discussed at length with patient at bedside  Patient chooses to sign out AMA and verbalizes risks including but not limited to ongoing infection which can lead to multi organ failure and ultimately death  Offered to discuss with Infectious Disease prior to discharge however patient declined and stated at she would be going to a different hospital but would not elaborate further  PICC line removed by nursing prior to discharge

## 2021-07-23 NOTE — ASSESSMENT & PLAN NOTE
· During admission at [address] [place] [address] be patient found to have small scattered peripheral glass home glass opacities within the bilateral lung bases due to pneumonia versus septic emboli  · Check CT chest

## 2021-07-23 NOTE — ASSESSMENT & PLAN NOTE
· Admits to using intravenous cocaine and fentanyl use      · Patient also reports that she is homeless due to not being welcome at her house due to drug abuse

## 2021-07-23 NOTE — ASSESSMENT & PLAN NOTE
· Continue Abilify 10 mg daily, Klonopin 0 5 mg b i d, Seroquel 50 mg at night  · Denied SI/HI  · Alert and oriented, verbalized risks of signing out AMA

## 2021-07-23 NOTE — CASE MANAGEMENT
LOS: 3 days  Bundle: No  Unplanned Readmission Score: 68 RED  30 Day Readmission: Yes SLB 7/11/21-7/18/21     CM met with patient at bedside  Explained the role of CM  Obtained the following information from patient  Home: Homeless mom kicked her out  Mom lives in [place]    Working on renting a room  Lives With: alone  ADL's: Independent  DME: None  Ambulation: Independent  Transportation: Self  Pharmacy: [facility] [address] [place] [address] N  [facility]   PCP: None at this time    Refuses info link card  [facility] Hx: No  Rehab Hx: Many due to drug abuse  Mental Health Hx: IP years again due to drugs  Substance Abuse Hx: Cocaine   Employment:None  POA/LW/AD: No/No/No declines information   Lars Perez Mother Chu Morgan can makes decisions if patient is unable  Transport at D/C: Self    Patient reports she is going to be in hospital for 6 weeks for IVAB  Patient refuses info on D&A rehabs  Patient reports I have been in drug rehab 36 times and states there are drugs in rehabs and that is why she always relapses  Patient can not return to mom's home  Patient at this time has no DC dispositions states I will figure it out  Car is in parking lot and will drive herself at DC  CM reviewed d/c planning process including the following: identifying help at home, patient preferences for d/c planning needs,  availability of treatment team to discuss questions or concerns patient and/or family may have regarding understanding medications and recognizing signs and symptoms once discharged

## 2021-07-23 NOTE — ASSESSMENT & PLAN NOTE
· Admits to using intravenous cocaine and fentanyl use      · Patient also reports that she is homeless due to not being welcome at her house due to drug abuse  · case management

## 2021-07-23 NOTE — ASSESSMENT & PLAN NOTE
· Abscess formation on right lateral ankle and left forearm   · general surgery, patient refused drainage  · Signed out AMA

## 2021-07-23 NOTE — PROGRESS NOTES
Hernán Rear  31 y o   female  1990  mrn [id number]    Assessment/Plan:  1  MRSA bacteremia/Right ankle abscess/Left arm abscess: No fever and WBC count is nml  Repeat bld cx's are neg  Dr Marilyn Garcia will take the Pt to the OR on Monday to drain right ankle abscess  Pt left AMA from [facility] and presented to [facility] to cont IV abx for tx of MRSA bacteremia with right ankle abscess and left arm abscess  Bld cx drawn on 7/11 at [facility] grew MRSA  Bld cx's drawn on 7/13 were neg  She underwent I & D of left arm abscess and cx grew MRSA  Ferol Kwan She refused bedside I & D of right ankle abscess because she wanted more anesthesia before allowing the procedure to be done       CT of lumbar spine at [facility] was neg for discitis/osteo  Abd CT showed findings suggestive of septic emboli vs pneumonia  CRP is 22 1  ESR is pending  MRI of lumbar spine was ordered for further eval of back pain      Admission CXR showed scattered periph GGO's - ?pneumonia vs septic emboli  COVID19 test is neg x 2  PICC line was placed on admission and Vanco was restarted  She did not allow Surgery to drain her right ankle abscess at the bedside  ECHO was neg for valvular vegetation      A  Cont Vanco - would give 6 week course especially in setting of previoius TV endocarditis - s/p repair  B  Podiatry will take the Pt to the OR on Monday so adequate anesthesia can be given forI & D of right ankle abscess  C  Awaiting results of repeat bld cx's  D  Awaiting results of ESR  E  Awaiting to see if MRI of lumbar spine with and without JESSICA can be done to eval for lumbar discitis/osteo - Pt states that she has piercings that cannot be removed so may not be able to do MRI study  F  Would do chest CT to eval for septic emboli  G   Pt will need to go to SNF to complete her course of IV abx since she cannot go home with PICC line due to h/o IVDA - Pt used cocaine after she left [facility] and before she came to [facility]          Subjective: No new complaints    Objective: Tmax: 98 1  Lungs: Clear  Abd: +BS, soft, nontender  Ext: +Fluctuant area on right ankle    Labs:  CBC w/diff  Recent Labs     07/23/21  0000   WBC 6 30   HGB 9 8*   HCT 30 9*      NEUTOPHILPCT 40*   LYMPHOPCT 44   MONOPCT 10   EOSPCT 5     BMP  Recent Labs     07/23/21  0000   K 4 0      CO2 29   BUN 16   CREATININE 0 66   CALCIUM 8 5     CMP  Recent Labs     07/21/21  0457 07/23/21  0000   K 3 8 4 0    106   CO2 29 29   BUN 13 16   CREATININE 0 70 0 66   CALCIUM 8 3 8 5   ALKPHOS 106  --    ALT 16  --    AST 25  --         labrc    Cultures:  Lab Results   Component Value Date    BLOODCX No Growth at 48 hrs  07/20/2021    BLOODCX No Growth at 48 hrs  07/20/2021    BLOODCX No Growth at 72 hrs  07/20/2021    BLOODCX No Growth at 72 hrs  07/19/2021    BLOODCX No Growth After 5 Days  07/13/2021    BLOODCX No Growth After 5 Days  07/13/2021    BLOODCX Methicillin Resistant Staphylococcus aureus (A) 07/11/2021    BLOODCX No Growth After 5 Days  05/26/2021    BLOODCX No Growth After 5 Days  05/26/2021    BLOODCX No Growth After 5 Days  05/20/2021    BLOODCX No Growth After 5 Days  05/20/2021    BLOODCX No Growth After 5 Days  03/17/2021    BLOODCX No Growth After 5 Days  03/17/2021    BLOODCX No Growth After 5 Days  03/13/2021    BLOODCX No Growth After 5 Days  03/13/2021    BLOODCX No Growth After 5 Days  03/11/2021    BLOODCX No Growth After 5 Days  03/11/2021    BLOODCX No Growth After 5 Days  03/10/2021    BLOODCX No Growth After 5 Days  03/10/2021    BLOODCX No Growth After 5 Days  03/10/2021    BLOODCX No Growth After 5 Days  12/26/2020    BLOODCX No Growth After 5 Days  12/26/2020    BLOODCX No Growth After 5 Days  10/24/2020    BLOODCX No Growth After 5 Days  10/24/2020    BLOODCX No Growth After 5 Days  08/26/2020    BLOODCX No Growth After 5 Days   08/15/2020    BLOODCX Staphylococcus aureus (A) 08/13/2020    BLOODCX Staphylococcus aureus (A) 08/10/2020    BLOODCX Staphylococcus aureus (A) 08/10/2020    BLOODCX Staphylococcus coagulase negative (A) 08/10/2020    BLOODCX No Growth After 5 Days  07/21/2020    BLOODCX No Growth After 5 Days  07/21/2020    BLOODCX No Growth After 5 Days  07/18/2020    BLOODCX No Growth After 5 Days   07/18/2020    BLOODCX Staphylococcus aureus (A) 07/15/2020    BLOODCX Staphylococcus aureus (A) 07/15/2020     Lab Results   Component Value Date    WOUNDCULT (A) 07/18/2021     1+ Growth of Methicillin Resistant Staphylococcus aureus    WOUNDCULT 1+ Growth of Staphylococcus aureus (A) 07/21/2020     Lab Results   Component Value Date    URINECX 40,000-49,000 cfu/ml  07/12/2021    URINECX >100,000 cfu/ml Escherichia coli (A) 10/24/2020    URINECX 40,000-49,000 cfu/ml  07/15/2020    URINECX No Growth <1000 cfu/mL 11/20/2016     No results found for: SPUTUMCULTUR    MED:  Vanco: #12      Current Facility-Administered Medications:     acetaminophen (TYLENOL) tablet 975 mg, 975 mg, Oral, Q8H, Alana Johnston PA-C, 975 mg at 07/23/21 1253    ARIPiprazole (ABILIFY) tablet 10 mg, 10 mg, Oral, Daily, Alana Johnston PA-C, 10 mg at 07/23/21 0905    clonazePAM (KlonoPIN) tablet 0 5 mg, 0 5 mg, Oral, BID, Alana Johnston PA-C, 0 5 mg at 07/23/21 0905    gabapentin (NEURONTIN) capsule 300 mg, 300 mg, Oral, BID, Alana Johnston PA-C, 300 mg at 07/23/21 0905    heparin (porcine) 25,000 units in 0 45% NaCl 250 mL infusion (premix), 3-20 Units/kg/hr (Order-Specific), Intravenous, Titrated, Chris Suarez MD, Last Rate: 12 mL/hr at 07/23/21 1257, 20 Units/kg/hr at 07/23/21 1257    heparin (porcine) injection 1,800 Units, 1,800 Units, Intravenous, Q1H PRN, Klarissa Sheldon MD    heparin (porcine) injection 3,600 Units, 3,600 Units, Intravenous, Q1H PRN, Klarissa Sheldon MD, 3,600 Units at 07/23/21 1258    HYDROmorphone (DILAUDID) injection 0 5 mg, 0 5 mg, Intravenous, Q4H PRN, Brittney Salmeron PA-C, 0 5 mg at 07/23/21 1100    hydrOXYzine HCL (ATARAX) tablet 25 mg, 25 mg, Oral, Q6H PRN, Kyleigh Hammer PA-C    lidocaine (LIDODERM) 5 % patch 1 patch, 1 patch, Topical, Daily, Kyleigh Hammer PA-C, 1 patch at 07/23/21 0904    lidocaine 1% buffered 20 mL, 20 mL, Infiltration, Once, Dhara Hernandez PA-C    methocarbamol (ROBAXIN) tablet 750 mg, 750 mg, Oral, Q6H PRN, Kyleigh Hammer PA-C    ondansetron (ZOFRAN) injection 4 mg, 4 mg, Intravenous, Q6H PRN, Kyleigh Hammer PA-C    oxyCODONE (ROXICODONE) IR tablet 10 mg, 10 mg, Oral, Q6H PRN, Kyleigh Hammer PA-C, 10 mg at 07/21/21 2332    oxyCODONE (ROXICODONE) IR tablet 15 mg, 15 mg, Oral, Q6H PRN, Kyleigh Hammer PA-C, 15 mg at 07/23/21 0905    QUEtiapine (SEROquel) tablet 50 mg, 50 mg, Oral, HS, Alana Johnston PA-C, 50 mg at 07/22/21 2140    vancomycin (VANCOCIN) IVPB (premix in dextrose) 1,000 mg 200 mL, 15 mg/kg, Intravenous, Q8H, Alana Johnston PA-C, Last Rate: 200 mL/hr at 07/23/21 0904, 1,000 mg at 07/23/21 3758    Current Outpatient Medications:     QUEtiapine (SEROquel) 50 mg tablet, Take 50 mg by mouth daily at bedtime, Disp: , Rfl:     apixaban (ELIQUIS) 5 mg, Take 2 tablets (10 mg total) by mouth 2 (two) times a day for 7 days, THEN 1 tablet (5 mg total) 2 (two) times a day for 23 days  , Disp: 74 tablet, Rfl: 0    ARIPiprazole (ABILIFY) 10 mg tablet, Take 10 mg by mouth daily Dosage unknown, Disp: , Rfl:     clonazePAM (KlonoPIN) 0 5 mg tablet, Take 1 tablet (0 5 mg total) by mouth 2 (two) times a day for 3 days, Disp: 6 tablet, Rfl: 0    gabapentin (NEURONTIN) 300 mg capsule, Take 1 capsule (300 mg total) by mouth 2 (two) times a day for 7 days, Disp: 14 capsule, Rfl: 0    prazosin (MINIPRESS) 1 mg capsule, Take 1 capsule (1 mg total) by mouth daily at bedtime, Disp: 30 capsule, Rfl: 0    Principal Problem:    MRSA bacteremia  Active Problems:    Sepsis ([facility] [address] )    Septic embolism (HCC)    Bipolar 1 disorder (HCC)    Intravenous drug abuse (HCC)    Chronic anticoagulation    Cellulitis of lower extremity    Generalized pain    History of endocarditis Brigida Siddiqui MD

## 2021-07-23 NOTE — ASSESSMENT & PLAN NOTE
· Continue Eliquis 5 mg b i d   · Eliquis had been on hold and switched to heparin due to need for I&D  · Resume on discharge

## 2021-07-23 NOTE — ASSESSMENT & PLAN NOTE
· Cellulitis of her right lower extremity     · Was started on IV vancomycin  · Patient refuse to give a pharmacy name to send possible oral antibiotic script, she declined provider to reach out to ID prior to her AMA

## 2021-07-23 NOTE — ASSESSMENT & PLAN NOTE
· During admission at [facility] ([facility]) be patient found to have small scattered peripheral glass home glass opacities within the bilateral lung bases due to pneumonia versus septic emboli  · [facility]

## 2021-07-23 NOTE — NURSING NOTE
Patient increasingly agitated and requesting to sign out against medical advice  This nurse attempted to deescalate situation and convince patient to stay for long term antibiotics with no success  PA currently at bedside with [facility] paperwork

## 2021-07-23 NOTE — PROGRESS NOTES
Vancomycin IV Pharmacy-to-Dose Consultation    Dharmesh Haynes is a 31 y o  female who is currently receiving Vancomycin IV with management by the Pharmacy Consult service  Assessment/Plan:  The patient was reviewed  Renal function is stable and no signs or symptoms of nephrotoxicity and/or infusion reactions were documented in the chart  Based on todays assessment, continue current vancomycin (day # 4) dosing of 1 gram Q 8H, with a plan for trough to be drawn at 0830 on 07/25/21  We will continue to follow the patients culture results and clinical progress daily      Yon Nelson, Pharmacist

## 2021-07-23 NOTE — CONSULTS
Consultation - Infectious Disease   Ana Landry 31 y o  female MRN: [id number]  Unit/Bed#: -01 Encounter: [id number]      Assessment/Plan   1  MRSA bacteremia/Right ankle abscess/Left arm abscess: Pt left AMA from [facility] and presented to [facility] to cont IV abx for tx of MRSA bacteremia with right ankle abscess and left arm abscess  Bld cx drawn on 7/11 at [facility] grew MRSA  Bld cx's drawn on 7/13 were neg  She underwent I & D of left arm abscess and cx grew MRSA  Betsy Cunning She refused bedside I & D of right ankle abscess because she wanted more anesthesia before allowing the procedure to be done  CT of lumbar spine at [facility] was neg for discitis/osteo  Abd CT showed findings suggestive of septic emboli vs pneumonia  Admission CXR showed scattered periph GGO's - ?pneumonia vs septic emboli  COVID19 test is neg x 2  PICC line was placed on admission and Vanco was restarted  She did not allow Surgery to drain her right ankle abscess at the bedside  Repeat bld cx's are neg  ECHO was neg for valvular vegetation  A  Cont Vanco - would give 6 week course especially in setting of previoius TV endocarditis - s/p repair  B  Would consult Podiatry to see if he would take Pt to the OR so adequate anesthesia can be given so Pt will allow I & D of right ankle abscess  C  Awaiting results of repeat bld cx's  D  Will check ESR and CRP  E  If possible, would do MRI of lumbar spine with and without JESSICA to eval for lumbar discitis/osteo - Pt states that she has piercings that cannot be removed so may not be able to do MRI study  F  Would do chest CT to eval for septic emboli  G   Pt will need to go to SNF to complete her course of IV abx since she cannot go home with PICC line due to h/o IVDA - Pt used cocaine after she left [facility] and before she came to [facility]        History of Present Illness   Physician Requesting Consult: Jt Payne MD  Reason for Consult / Principal Problem: MRSA bacteremia and right ankle and left arm abscesses     HPI: Shannon Donovan is a 31y o  year old female with H/O endocarditis - s/p TV repair, anxiety, depression, HCV infxn, IVDA, bipolar disorder who was admitted on  for tx of MRSA bacteremia and right ankle abscess  Pt was hospitalized at [facility] from  to   She was dx'd with MRSA bacteremia  Repeat bld cx's were neg  Left arm abscess was drained  CX grew MRSA  She refused I & D of right ankle  She was started on Vanco on   ECHO was neg for valvular vegetation  CT of lumbar spine was neg for discitis/osteo  Abd CT showed scattered periph groundglass opacities within the bilat lung bases  ID recommended 6 week course of IV abx  She signed out AMA because she did not like the way that she was being tx'd  She went to the [facility] but left because they would not place a PICC line  She came to [facility] for further IV abx  On admission, she did not have fever or elevated WBC count  PICC line was placed  Repeat bld cx's were drawn  She was restarted on Vanco  She refused bedside I & D of right ankle abscess by Surgery  Pt conts to have low back pain  CXR showed scattered periph GGO's - ?pneumonia vs septic emboli  COVID19 test was neg x 2  Pt denied fever, chills, cough, SOB, CP, N/V/D, abd pain, or dysuria    Inpatient consult to Infectious Diseases  Consult performed by: Immanuel Hamilton MD  Consult ordered by: Genora MILLIE Gilman          ROS: 12 systems reviewed, remainder is neg      Historical Information   Past Medical History:   Diagnosis Date    Abnormal Pap smear of cervix     Anxiety     Depression     Endocarditis         Hepatitis C     HPV (human papilloma virus) anogenital infection      Past Surgical History:   Procedure Laterality Date    INDUCED       IR PICC PLACEMENT DOUBLE LUMEN  2020    IR PICC PLACEMENT DOUBLE LUMEN  2021    KNEE SURGERY Left     MOUTH SURGERY      MA REPLACE TRICUSPID W CP BYPASS N/A 9/10/2020    Procedure: REPAIR VALVE TRICUSPID with [employer] 30mm 3D Contour  Annuloplasty Ring;  Surgeon: Luz Jenkins MD;  [facility];  Service: Cardiac Surgery    TOOTH EXTRACTION N/A 2020    Procedure: EXTRACTION TOOTH 32;  Surgeon: Soledad Sullivan DMD;  Location: [facility];  Service: Maxillofacial     Social History   Social History     Substance and Sexual Activity   Alcohol Use Not Currently    Alcohol/week: 0 0 standard drinks     Social History     Substance and Sexual Activity   Drug Use Yes    Types: Cocaine, Fentanyl    Comment: injected cocaine last night  Social History     Tobacco Use   Smoking Status Current Every Day Smoker    Packs/day: 0 50    Types: Cigarettes    Last attempt to quit: 2016    Years since quittin 7   Smokeless Tobacco Never Used     History reviewed  No pertinent family history      Meds/Allergies   MEDS:  Vanco: #11      Current Facility-Administered Medications:     acetaminophen (TYLENOL) tablet 975 mg, 975 mg, Oral, Q8H, Alana Johnston PA-C, 975 mg at 21    ARIPiprazole (ABILIFY) tablet 10 mg, 10 mg, Oral, Daily, Alana Johnston PA-C, 10 mg at 21 0834    clonazePAM (KlonoPIN) tablet 0 5 mg, 0 5 mg, Oral, BID, Alana Johnston PA-C, 0 5 mg at 21 173    gabapentin (NEURONTIN) capsule 300 mg, 300 mg, Oral, BID, Alana Johnston PA-C, 300 mg at 21 173    heparin (porcine) 25,000 units in 0 45% NaCl 250 mL infusion (premix), 3-20 Units/kg/hr (Order-Specific), Intravenous, Titrated, Chris Suarez MD, Last Rate: 7 2 mL/hr at 21, 12 Units/kg/hr at 21    heparin (porcine) injection 1,800 Units, 1,800 Units, Intravenous, Q1H PRN, Jena Casarez MD    heparin (porcine) injection 3,600 Units, 3,600 Units, Intravenous, Q1H PRN, Jena Casarez MD    HYDROmorphone (DILAUDID) injection 0 5 mg, 0 5 mg, Intravenous, Q4H PRN, Amaury Rosenbaum PA-C, 0 5 mg at 21 2141    hydrOXYzine HCL (ATARAX) tablet 25 mg, 25 mg, Oral, Q6H PRN, Katrin Gilman PA-C    lidocaine (LIDODERM) 5 % patch 1 patch, 1 patch, Topical, Daily, Alana Johnston PA-C, 1 patch at 07/21/21 0847    lidocaine 1% buffered 20 mL, 20 mL, Infiltration, Once, Demetrio Wren MILLIE Hernandez    methocarbamol (ROBAXIN) tablet 750 mg, 750 mg, Oral, Q6H PRN, JESUS Wilder-NALLELY    ondansetron (ZOFRAN) injection 4 mg, 4 mg, Intravenous, Q6H PRN, Katrin Gilman PA-C    oxyCODONE (ROXICODONE) IR tablet 10 mg, 10 mg, Oral, Q6H PRN, JESUS Wilder-NALLELY, 10 mg at 07/21/21 2332    oxyCODONE (ROXICODONE) IR tablet 15 mg, 15 mg, Oral, Q6H PRN, Katrin Gilman PA-C, 15 mg at 07/22/21 2009    QUEtiapine (SEROquel) tablet 50 mg, 50 mg, Oral, HS, Alana Johnston PA-C, 50 mg at 07/22/21 2140    vancomycin (VANCOCIN) IVPB (premix in dextrose) 1,000 mg 200 mL, 15 mg/kg, Intravenous, Q8H, Alana Johnston PA-C, Last Rate: 200 mL/hr at 07/22/21 1735, 1,000 mg at 07/22/21 1735    Allergies   Allergen Reactions    Cat Hair Extract Itching    Dog Epithelium     Latex     Pollen Extract        No intake or output data in the 24 hours ending 07/22/21 2302    PE:  WD, WN, WF in NAD  VSS, Tmax: 98 4  HEENT:  No scleral icterus, pharynx clear  NECK: Supple  CARDIAC:  RRR, nml S1, S2, no murmur  LUNGS: Clear  ABDOMEN: +BS, soft, nontender  EXTREMITIES: Left arm with minimal redness at previous abscess site - no drainage, +mild induration  +Skin abscess on lateral right ankle  SKIN: No rash  NEURO: Grossly nonfocal    Invasive Devices:   PICC Line 07/20/21 (Active)   Reasons to continue PICC No peripheral vascular access 07/22/21 2000   Goal for Removal D/C when no longer on IV medications 07/22/21 2000   Line Necessity Reviewed Yes, reviewed with provider 07/22/21 2000   Site Assessment WDL 07/22/21 2000   #1 Lumen Color/Status Red lumen 07/22/21 2000   #2 Lumen Color/Status Purple lumen 07/22/21 2000   Dressing Type Chlorhexidine dressing 07/22/21 2000   Dressing Status Clean;Dry; Intact 07/22/21 2000 Lab Results:   Admission on 07/20/2021   Component Date Value    PTT 07/20/2021 31     Protime 07/20/2021 14 1     INR 07/20/2021 1 09     Blood Culture 07/20/2021 No Growth at 48 hrs   Blood Culture 07/20/2021 No Growth at 48 hrs       WBC 07/20/2021 12 13*    RBC 07/20/2021 4 31     Hemoglobin 07/20/2021 12 2     Hematocrit 07/20/2021 38 1     MCV 07/20/2021 88     MCH 07/20/2021 28 3     MCHC 07/20/2021 32 0     RDW 07/20/2021 12 2     MPV 07/20/2021 8 9     Platelets 33/38/4199 687*    nRBC 07/20/2021 0     Neutrophils Relative 07/20/2021 68     Immat GRANS % 07/20/2021 1     Lymphocytes Relative 07/20/2021 22     Monocytes Relative 07/20/2021 7     Eosinophils Relative 07/20/2021 2     Basophils Relative 07/20/2021 0     Neutrophils Absolute 07/20/2021 8 37*    Immature Grans Absolute 07/20/2021 0 09     Lymphocytes Absolute 07/20/2021 2 64     Monocytes Absolute 07/20/2021 0 81     Eosinophils Absolute 07/20/2021 0 18     Basophils Absolute 07/20/2021 0 04     Sodium 07/20/2021 139     Potassium 07/20/2021 4 3     Chloride 07/20/2021 100     CO2 07/20/2021 29     ANION GAP 07/20/2021 10     BUN 07/20/2021 17     Creatinine 07/20/2021 0 98     Glucose 07/20/2021 99     Calcium 07/20/2021 9 2     Corrected Calcium 07/20/2021 9 9     AST 07/20/2021 36     ALT 07/20/2021 19     Alkaline Phosphatase 07/20/2021 111     Total Protein 07/20/2021 8 4*    Albumin 07/20/2021 3 1*    Total Bilirubin 07/20/2021 0 20     eGFR 07/20/2021 79     LACTIC ACID 07/20/2021 1 7     Procalcitonin 07/20/2021 <0 05     Troponin I 07/20/2021 <0 02     SARS-CoV-2 07/20/2021 Negative     Procalcitonin 07/21/2021 <0 05     Sodium 07/21/2021 140     Potassium 07/21/2021 3 8     Chloride 07/21/2021 104     CO2 07/21/2021 29     ANION GAP 07/21/2021 7     BUN 07/21/2021 13     Creatinine 07/21/2021 0 70     Glucose 07/21/2021 89     Calcium 07/21/2021 8 3     Corrected Calcium 07/21/2021 9 7     AST 07/21/2021 25     ALT 07/21/2021 16     Alkaline Phosphatase 07/21/2021 106     Total Protein 07/21/2021 6 5     Albumin 07/21/2021 2 3*    Total Bilirubin 07/21/2021 0 20     eGFR 07/21/2021 118     WBC 07/21/2021 5 78     RBC 07/21/2021 3 17*    Hemoglobin 07/21/2021 8 8*    Hematocrit 07/21/2021 28 5*    MCV 07/21/2021 90     MCH 07/21/2021 27 8     MCHC 07/21/2021 30 9*    RDW 07/21/2021 12 5     MPV 07/21/2021 8 5*    Platelets 99/45/8074 331     nRBC 07/21/2021 0     Neutrophils Relative 07/21/2021 51     Immat GRANS % 07/21/2021 0     Lymphocytes Relative 07/21/2021 34     Monocytes Relative 07/21/2021 10     Eosinophils Relative 07/21/2021 5     Basophils Relative 07/21/2021 0     Neutrophils Absolute 07/21/2021 2 95     Immature Grans Absolute 07/21/2021 0 02     Lymphocytes Absolute 07/21/2021 1 94     Monocytes Absolute 07/21/2021 0 57     Eosinophils Absolute 07/21/2021 0 29     Basophils Absolute 07/21/2021 0 01     POC Glucose 07/21/2021 96     POC Glucose 07/21/2021 134     Vancomycin Tr 07/21/2021 14 2     WBC 07/22/2021 6 66     RBC 07/22/2021 3 47*    Hemoglobin 07/22/2021 9 8*    Hematocrit 07/22/2021 31 3*    MCV 07/22/2021 90     MCH 07/22/2021 28 2     MCHC 07/22/2021 31 3*    RDW 07/22/2021 12 4     MPV 07/22/2021 8 3*    Platelets 11/23/6216 380     nRBC 07/22/2021 0     Neutrophils Relative 07/22/2021 44     Immat GRANS % 07/22/2021 1     Lymphocytes Relative 07/22/2021 38     Monocytes Relative 07/22/2021 11     Eosinophils Relative 07/22/2021 5     Basophils Relative 07/22/2021 1     Neutrophils Absolute 07/22/2021 2 99     Immature Grans Absolute 07/22/2021 0 04     Lymphocytes Absolute 07/22/2021 2 54     Monocytes Absolute 07/22/2021 0 73     Eosinophils Absolute 07/22/2021 0 33     Basophils Absolute 07/22/2021 0 03     Sodium 07/22/2021 141     Potassium 07/22/2021 4 1     Chloride 07/22/2021 105  CO2 07/22/2021 30     ANION GAP 07/22/2021 6     BUN 07/22/2021 14     Creatinine 07/22/2021 0 72     Glucose 07/22/2021 114     Calcium 07/22/2021 8 1*    eGFR 07/22/2021 114     Vancomycin Tr 07/22/2021 16 6     PTT 07/22/2021 38*    Protime 07/22/2021 14 7*    INR 07/22/2021 1 14      Imaging Studies: I have personally reviewed pertinent reports  EKG, Pathology, and Other Studies: I have personally reviewed pertinent reports  Culture  Lab Results   Component Value Date    BLOODCX No Growth at 48 hrs  07/20/2021    BLOODCX No Growth at 48 hrs  07/20/2021    BLOODCX No Growth at 48 hrs  07/20/2021    BLOODCX No Growth at 72 hrs  07/19/2021    BLOODCX No Growth After 5 Days  07/13/2021    BLOODCX No Growth After 5 Days  07/13/2021    BLOODCX Methicillin Resistant Staphylococcus aureus (A) 07/11/2021    BLOODCX No Growth After 5 Days  05/26/2021    BLOODCX No Growth After 5 Days  05/26/2021    BLOODCX No Growth After 5 Days  05/20/2021    BLOODCX No Growth After 5 Days  05/20/2021    BLOODCX No Growth After 5 Days  03/17/2021    BLOODCX No Growth After 5 Days  03/17/2021    BLOODCX No Growth After 5 Days  03/13/2021    BLOODCX No Growth After 5 Days  03/13/2021    BLOODCX No Growth After 5 Days  03/11/2021    BLOODCX No Growth After 5 Days  03/11/2021    BLOODCX No Growth After 5 Days  03/10/2021    BLOODCX No Growth After 5 Days  03/10/2021    BLOODCX No Growth After 5 Days  03/10/2021    BLOODCX No Growth After 5 Days  12/26/2020    BLOODCX No Growth After 5 Days  12/26/2020    BLOODCX No Growth After 5 Days  10/24/2020    BLOODCX No Growth After 5 Days  10/24/2020    BLOODCX No Growth After 5 Days  08/26/2020    BLOODCX No Growth After 5 Days  08/15/2020    BLOODCX Staphylococcus aureus (A) 08/13/2020    BLOODCX Staphylococcus aureus (A) 08/10/2020    BLOODCX Staphylococcus aureus (A) 08/10/2020    BLOODCX Staphylococcus coagulase negative (A) 08/10/2020    BLOODCX No Growth After 5 Days  07/21/2020    BLOODCX No Growth After 5 Days  07/21/2020    BLOODCX No Growth After 5 Days  07/18/2020    BLOODCX No Growth After 5 Days   07/18/2020    BLOODCX Staphylococcus aureus (A) 07/15/2020    BLOODCX Staphylococcus aureus (A) 07/15/2020     Lab Results   Component Value Date    WOUNDCULT (A) 07/18/2021     1+ Growth of Methicillin Resistant Staphylococcus aureus    WOUNDCULT 1+ Growth of Staphylococcus aureus (A) 07/21/2020     Lab Results   Component Value Date    URINECX 40,000-49,000 cfu/ml  07/12/2021    URINECX >100,000 cfu/ml Escherichia coli (A) 10/24/2020    URINECX 40,000-49,000 cfu/ml  07/15/2020    URINECX No Growth <1000 cfu/mL 11/20/2016     No results found for: SPUTUMCULTUR    Principal Problem:    MRSA bacteremia  Active Problems:    Sepsis ([facility] )    Septic embolism ([place] [address] )    Bipolar 1 disorder (HCC)    Intravenous drug abuse ([place] [address] )    Chronic anticoagulation    Cellulitis of lower extremity    Generalized pain    History of endocarditis    Abscess

## 2021-07-23 NOTE — ASSESSMENT & PLAN NOTE
· Presents to the ED due to ongoing pain throughout her body, worse in her back and right lower leg  · Patient is known to have MRSA bacteremia that had been in the process of being treated at [facility] but patient left AMA  · Pain management as ordered

## 2021-07-23 NOTE — ASSESSMENT & PLAN NOTE
· Abscess formation on right lateral ankle and left forearm   · general surgery, patient refused drainage  · Podiatry to perform I&D on Monday 07/26, Eliquis switched to Heparin

## 2021-07-23 NOTE — ASSESSMENT & PLAN NOTE
· Source is likely cellulitis/abscess  · Initially admitted at [facility] from 07/11 through 7/18  History of IV drug use  · Initially had a positive blood culture growing MRSA on 7/11  Repeat blood cultures performed on 07/13 with no growth     · TTE 07/16/2021: no vegetation noted  · PICC line placed on 07/20, continue IV vancomycin  · Complaint of back pain,MRI T/L with contrast ordered   · infectious disease following   · Patient signed out [facility]

## 2021-07-23 NOTE — ASSESSMENT & PLAN NOTE
sepsis present on admission as evidenced by tachycardia, leukocytosis, due to known MRSA bacteremia from cellulitis and abscess formation involving the left forearm and right ankle  Lactic normal, UA pending, procalcitonin is negative x2  Blood cultures without growth from 07/21/2021  Wound cultures collected on 07/18/2021, resulting 07/21/2021 with MRSA  Continue with IV vancomycin #4, anticipate 6 weeks therapy in total given hx of endocarditis   PICC line placed 07/20/2021

## 2021-07-23 NOTE — ASSESSMENT & PLAN NOTE
Source is likely cellulitis/abscess  Initially admitted at [facility] from 07/11 through 7/18  History of IV drug use  Initially had a positive blood culture growing MRSA on 7/11  Repeat blood cultures performed on 07/13 with no growth     TTE 07/16/2021: no vegetation noted  PICC line placed on 07/20, continue IV vancomycin  Complaint of back pain,MRI T/L with contrast ordered   infectious disease following

## 2021-07-23 NOTE — PROGRESS NOTES
[place]  Progress Note Nehal Guerrero 1990, 31 y o  female MRN: [id number]  Unit/Bed#: -01 Encounter: [id number]  Primary Care Provider: No primary care provider on file  Date and time admitted to hospital: 7/20/2021  3:38 PM    Sepsis [facility])  Assessment & Plan  sepsis present on admission as evidenced by tachycardia, leukocytosis, due to known MRSA bacteremia from cellulitis and abscess formation involving the left forearm and right ankle  Lactic normal, UA pending, procalcitonin is negative x2  Blood cultures without growth from 07/21/2021  Wound cultures collected on 07/18/2021, resulting 07/21/2021 with MRSA  Continue with IV vancomycin #4, anticipate 6 weeks therapy in total given hx of endocarditis   PICC line placed 07/20/2021      * MRSA bacteremia  Assessment & Plan  Source is likely cellulitis/abscess  Initially admitted at [facility] AND [facility] from 07/11 through 7/18  History of IV drug use  Initially had a positive blood culture growing MRSA on 7/11  Repeat blood cultures performed on 07/13 with no growth     TTE 07/16/2021: no vegetation noted  PICC line placed on 07/20, continue IV vancomycin  Complaint of back pain,MRI T/L with contrast ordered   infectious disease following     Abscess  Assessment & Plan  · Abscess formation on right lateral ankle and left forearm   · general surgery, patient refused drainage  · Podiatry to perform I&D on Monday 07/26, Eliquis switched to Heparin       History of endocarditis  Assessment & Plan  · 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  · Echo performed on 07/16, without vegetations    Generalized pain  Assessment & Plan  · Presents to the ED due to ongoing pain throughout her body, worse in her back and right lower leg  · Patient is known to have MRSA bacteremia that had been in the process of being treated at [facility] AND [facility] but patient left AMA  · Pain management as ordered        Cellulitis of lower extremity  Assessment & Plan  · Cellulitis of her right lower extremity  · Continue with vancomycin    Chronic anticoagulation  Assessment & Plan  · Continue Eliquis 5 mg b i d   · Given need for I/D, hold Eliquis, switched to Heparin     Intravenous drug abuse ([facility] [address] )  Assessment & Plan  · Admits to using intravenous cocaine and fentanyl use  · Patient also reports that she is homeless due to not being welcome at her house due to drug abuse  · case management    Bipolar 1 disorder (HCC)  Assessment & Plan  · Continue Abilify 10 mg daily, Klonopin 0 5 mg b i d, Seroquel 50 mg at night    Septic embolism (HCC)  Assessment & Plan  · During admission at [address] [place] [address] be patient found to have small scattered peripheral glass home glass opacities within the bilateral lung bases due to pneumonia versus septic emboli  · Check CT chest         VTE Pharmacologic Prophylaxis: VTE Score: 2 Moderate Risk (Score 3-4) - Pharmacological DVT Prophylaxis Ordered: heparin drip  Patient Centered Rounds: I performed bedside rounds with nursing staff today  Discussions with Specialists or Other Care Team Provider:     Education and Discussions with Family / Patient: Patient declined call to   Time Spent for Care: 30 minutes  More than 50% of total time spent on counseling and coordination of care as described above  Current Length of Stay: 3 day(s)  Current Patient Status: Inpatient   Certification Statement: The patient will continue to require additional inpatient hospital stay due to MRSA infection, abscess on IV vancomycin   Discharge Plan: Anticipate discharge in >72 hrs to SNF    Code Status: Level 1 - Full Code    Subjective:   No new complaints to offer  Objective:     Vitals:   Temp (24hrs), Av 1 °F (36 7 °C), Min:98 1 °F (36 7 °C), Max:98 1 °F (36 7 °C)    Temp:  [98 1 °F (36 7 °C)] 98 1 °F (36 7 °C)  BP: (109)/(59) 109/59  Body mass index is 22 3 kg/m²       Input and Output Summary (last 24 hours):   No intake or output data in the 24 hours ending 07/23/21 0756    Physical Exam:   Physical Exam  Vitals and nursing note reviewed  Constitutional:       General: She is not in acute distress  Appearance: She is well-developed  HENT:      Head: Normocephalic and atraumatic  Eyes:      Conjunctiva/sclera: Conjunctivae normal    Cardiovascular:      Rate and Rhythm: Normal rate and regular rhythm  Heart sounds: Murmur heard  Pulmonary:      Effort: Pulmonary effort is normal  No respiratory distress  Breath sounds: Normal breath sounds  Abdominal:      Palpations: Abdomen is soft  Tenderness: There is no abdominal tenderness  Musculoskeletal:      Cervical back: Neck supple  Right lower leg: No edema  Left lower leg: No edema  Comments: RLE in clean dressing      Skin:     General: Skin is warm and dry  Neurological:      General: No focal deficit present  Mental Status: She is alert and oriented to person, place, and time  Mental status is at baseline  Cranial Nerves: No cranial nerve deficit  Motor: No weakness            Additional Data:     Labs:  Results from last 7 days   Lab Units 07/23/21  0000   WBC Thousand/uL 6 30   HEMOGLOBIN g/dL 9 8*   HEMATOCRIT % 30 9*   PLATELETS Thousands/uL 365   NEUTROS PCT % 40*   LYMPHS PCT % 44   MONOS PCT % 10   EOS PCT % 5     Results from last 7 days   Lab Units 07/23/21  0000 07/21/21  0457   SODIUM mmol/L 141 140   POTASSIUM mmol/L 4 0 3 8   CHLORIDE mmol/L 106 104   CO2 mmol/L 29 29   BUN mg/dL 16 13   CREATININE mg/dL 0 66 0 70   ANION GAP mmol/L 6 7   CALCIUM mg/dL 8 5 8 3   ALBUMIN g/dL  --  2 3*   TOTAL BILIRUBIN mg/dL  --  0 20   ALK PHOS U/L  --  106   ALT U/L  --  16   AST U/L  --  25   GLUCOSE RANDOM mg/dL 91 89     Results from last 7 days   Lab Units 07/22/21  1942   INR  1 14     Results from last 7 days   Lab Units 07/21/21  1148 07/21/21  0820   POC GLUCOSE mg/dl 134 96         Results from last 7 days   Lab Units 07/21/21  0457 07/20/21  1724   LACTIC ACID mmol/L  --  1 7   PROCALCITONIN ng/ml <0 05 <0 05       Lines/Drains:  Invasive Devices     Peripherally Inserted Central Catheter Line            PICC Line 07/20/21 2 days                Central Line:  Goal for removal: Will discontinue when meds requiring line are completed  Imaging: No pertinent imaging reviewed  Recent Cultures (last 7 days):   Results from last 7 days   Lab Units 07/20/21  1724 07/20/21  0250 07/19/21  2049 07/18/21  1720   BLOOD CULTURE  No Growth at 48 hrs  No Growth at 48 hrs  No Growth at 72 hrs   No Growth at 72 hrs   --    GRAM STAIN RESULT   --   --   --  4+ Polys*  1+ Gram positive cocci in pairs, chains and clusters*   WOUND CULTURE   --   --   --  1+ Growth of Methicillin Resistant Staphylococcus aureus*       Last 24 Hours Medication List:   Current Facility-Administered Medications   Medication Dose Route Frequency Provider Last Rate    acetaminophen  975 mg Oral Q8H Alana Johnston PA-C      ARIPiprazole  10 mg Oral Daily Jeanne Cagey, PA-C      clonazePAM  0 5 mg Oral BID Jeanne Cagey, PA-C      gabapentin  300 mg Oral BID Jeanne Cagey, PA-C      heparin (porcine)  3-20 Units/kg/hr (Order-Specific) Intravenous Titrated Cristina Spatz, MD 16 Units/kg/hr (07/23/21 0447)    heparin (porcine)  1,800 Units Intravenous Q1H PRN Cristina Spatz, MD      heparin (porcine)  3,600 Units Intravenous Q1H PRN Cristina Spatz, MD      HYDROmorphone  0 5 mg Intravenous Q4H PRN Jeanne Cagey, PA-C      hydrOXYzine HCL  25 mg Oral Q6H PRN Jeanne Cagey, PA-C      lidocaine  1 patch Topical Daily Jeanne Cagey, PA-C      lidocaine 1% buffered  20 mL Infiltration Once Danielle Hernandez PA-C      methocarbamol  750 mg Oral Q6H PRN Jeanne Cagey, PA-C      ondansetron  4 mg Intravenous Q6H PRN Jeanne Cagey, PA-C      oxyCODONE  10 mg Oral Q6H PRN Jeanne Cagey, PA-C      oxyCODONE  15 mg Oral Q6H PRN Enrique Pouch MILLIE Johnston      QUEtiapine  50 mg Oral HS Mynor Dempsey PA-C      vancomycin  15 mg/kg Intravenous Q8H Alana Johnston PA-C 1,000 mg (07/23/21 0116)        Today, Patient Was Seen By: Miky Holland MD    **Please Note: This note may have been constructed using a voice recognition system  **

## 2021-07-23 NOTE — DISCHARGE SUMMARY
[place]     Discharge- Eual Diss 1990, 31 y o  female MRN: [id number]  Unit/Bed#: -01 Encounter: [id number]  Primary Care Provider: No primary care provider on file  Date and time admitted to hospital: 7/20/2021  3:38 PM    Sepsis [facility])  Assessment & Plan  · sepsis present on admission as evidenced by tachycardia, leukocytosis, due to known MRSA bacteremia from cellulitis and abscess formation involving the left forearm and right ankle  · Lactic normal, UA pending, procalcitonin is negative x2  · Blood cultures without growth from 07/21/2021  · Wound cultures collected on 07/18/2021, resulting 07/21/2021 with MRSA  · Continue with IV vancomycin #4, anticipate 6 weeks therapy in total given hx of endocarditis   · PICC line placed 07/20/2021  · Notified per nursing patient requesting to sign out AMA  Discussed at length with patient at bedside  Patient chooses to sign out AMA and verbalizes risks including but not limited to ongoing infection which can lead to multi organ failure and ultimately death  Offered to discuss with Infectious Disease prior to discharge however patient declined and stated at she would be going to a different hospital but would not elaborate further  PICC line removed by nursing prior to discharge  * MRSA bacteremia  Assessment & Plan  · Source is likely cellulitis/abscess  · Initially admitted at [facility] from 07/11 through 7/18  History of IV drug use  · Initially had a positive blood culture growing MRSA on 7/11  Repeat blood cultures performed on 07/13 with no growth     · TTE 07/16/2021: no vegetation noted  · PICC line placed on 07/20, continue IV vancomycin  · Complaint of back pain,MRI T/L with contrast ordered   · infectious disease following   · Patient signed out AMA    Abscess  Assessment & Plan  · Abscess formation on right lateral ankle and left forearm   · general surgery, patient refused drainage  · Signed out AMA      Cellulitis of lower extremity  Assessment & Plan  · Cellulitis of her right lower extremity  · Was started on IV vancomycin  · Patient refuse to give a pharmacy name to send possible oral antibiotic script, she declined provider to reach out to ID prior to her AMA    Chronic anticoagulation  Assessment & Plan  · Continue Eliquis 5 mg b i d   · Eliquis had been on hold and switched to heparin due to need for I&D  · Resume on discharge    Intravenous drug abuse ([facility] [address] )  Assessment & Plan  · Admits to using intravenous cocaine and fentanyl use  · Patient also reports that she is homeless due to not being welcome at her house due to drug abuse    Bipolar 1 disorder (HCC)  Assessment & Plan  · Continue Abilify 10 mg daily, Klonopin 0 5 mg b i d, Seroquel 50 mg at night  · Denied SI/HI  · Alert and oriented, verbalized risks of signing out AMA    [facility]  Assessment & Plan  · During admission at  be patient found to have small scattered peripheral glass home glass opacities within the bilateral lung bases due to pneumonia versus septic emboli  · Patient signed out [facility]    Discharging Physician / Practitioner: Len Sanders PA-C  PCP: No primary care provider on file  Admission Date:   Admission Orders (From admission, onward)     Ordered        07/20/21 1846  INPATIENT ADMISSION  Once                   Discharge Date: 07/23/21    Medical Problems     Resolved Problems  Date Reviewed: 7/23/2021    None                Consultations During Hospital Stay:  · Infectious disease  · Podiatry  · General surgery    Procedures Performed:   · None    Significant Findings / Test Results:   · CXR:  No acute cardiopulmonary disease  · Leukocytosis 12 K on admission  · BMP WNL  · Blood cultures drawn 7/20 negative x2 at 48 hours  · Wound culture growing MRSA 7/18    Incidental Findings:   · As above     Test Results Pending at Discharge (will require follow up):    · None     Outpatient Tests Requested:  · None    Complications:  Patient signed out [place]    Reason for Admission:  MRSA bacteremia    Hospital Course:     Dontae Vegas is a 31 y o  female patient who originally presented to the hospital on 7/20/2021 due to generalized pain  Past medical history significant for IV drug abuse, endocarditis, bipolar disorder  Presented to the emergency department 07/20/2021 due to complain of generalized pain after being admitted to [facility] until 7/18 where she had subsequently signed out AMA  Infectious Disease, General surgery and Podiatry were consulted on readmission  Plan was for I&D on Monday 7/26 due to abscess and patient would need at least 6 weeks of IV vancomycin  Notified per nursing that patient wished to sign out AMA on 07/23  Patient verbalized understanding risks of signing out AMA including but not limited to worsening infection, multiorgan failure, death  Please see above list of diagnoses and related plan for additional information  Condition at Discharge: stable     Discharge Day Visit / Exam:     * Please refer to separate progress note for these details *    Discussion with Family: Declined update  Discharge instructions/Information to patient and family:   See after visit summary for information provided to patient and family  Provisions for Follow-Up Care:  See after visit summary for information related to follow-up care and any pertinent home health orders  Disposition:     Home - signed out AMA    For Discharges to [address]:   · Not Applicable to this Patient - Not Applicable to this Patient    Planned Readmission: None     Discharge Statement:  I spent 20 minutes discharging the patient  This time was spent on the day of discharge  I had direct contact with the patient on the day of discharge   Greater than 50% of the total time was spent examining patient, answering all patient questions, arranging and discussing plan of care with patient as well as directly providing post-discharge instructions  Additional time then spent on discharge activities  Discharge Medications:  See after visit summary for reconciled discharge medications provided to patient and family        ** Please Note: This note has been constructed using a voice recognition system **

## 2021-07-23 NOTE — PLAN OF CARE
Problem: Potential for Falls  Goal: Patient will remain free of falls  Description: INTERVENTIONS:  - Educate patient/family on patient safety including physical limitations  - Instruct patient to call for assistance with activity   - Consult OT/PT to assist with strengthening/mobility   - Keep Call bell within reach  - Keep bed low and locked with side rails adjusted as appropriate  - Keep care items and personal belongings within reach  - Initiate and maintain comfort rounds  - Make Fall Risk Sign visible to staff  - Offer Toileting every 2 Hours, in advance of need  - Initiate/Maintain alarm  - Obtain necessary fall risk management equipment:   - Apply yellow socks and bracelet for high fall risk patients  - Consider moving patient to room near nurses station  Outcome: Progressing     Problem: PAIN - ADULT  Goal: Verbalizes/displays adequate comfort level or baseline comfort level  Description: Interventions:  - Encourage patient to monitor pain and request assistance  - Assess pain using appropriate pain scale  - Administer analgesics based on type and severity of pain and evaluate response  - Implement non-pharmacological measures as appropriate and evaluate response  - Consider cultural and social influences on pain and pain management  - Notify physician/advanced practitioner if interventions unsuccessful or patient reports new pain  Outcome: Progressing     Problem: INFECTION - ADULT  Goal: Absence or prevention of progression during hospitalization  Description: INTERVENTIONS:  - Assess and monitor for signs and symptoms of infection  - Monitor lab/diagnostic results  - Monitor all insertion sites, i e  indwelling lines, tubes, and drains  - Monitor endotracheal if appropriate and nasal secretions for changes in amount and color  - [place] appropriate cooling/warming therapies per order  - Administer medications as ordered  - Instruct and encourage patient and family to use good hand hygiene technique  - Identify and instruct in appropriate isolation precautions for identified infection/condition  Outcome: Progressing  Goal: Absence of fever/infection during neutropenic period  Description: INTERVENTIONS:  - Monitor WBC    Outcome: Progressing     Problem: SAFETY ADULT  Goal: Patient will remain free of falls  Description: INTERVENTIONS:  - Educate patient/family on patient safety including physical limitations  - Instruct patient to call for assistance with activity   - Consult OT/PT to assist with strengthening/mobility   - Keep Call bell within reach  - Keep bed low and locked with side rails adjusted as appropriate  - Keep care items and personal belongings within reach  - Initiate and maintain comfort rounds  - Make Fall Risk Sign visible to staff  - Offer Toileting every 2 Hours, in advance of need  - Initiate/Maintain bed alarm  - Obtain necessary fall risk management equipment:   - Apply yellow socks and bracelet for high fall risk patients  - Consider moving patient to room near nurses station  Outcome: Progressing  Goal: Maintain or return to baseline ADL function  Description: INTERVENTIONS:  -  Assess patient's ability to carry out ADLs; assess patient's baseline for ADL function and identify physical deficits which impact ability to perform ADLs (bathing, care of mouth/teeth, toileting, grooming, dressing, etc )  - Assess/evaluate cause of self-care deficits   - Assess range of motion  - Assess patient's mobility; develop plan if impaired  - Assess patient's need for assistive devices and provide as appropriate  - Encourage maximum independence but intervene and supervise when necessary  - Involve family in performance of ADLs  - Assess for home care needs following discharge   - Consider OT consult to assist with ADL evaluation and planning for discharge  - Provide patient education as appropriate  Outcome: Progressing  Goal: Maintains/Returns to pre admission functional level  Description: INTERVENTIONS:  - Perform BMAT or MOVE assessment daily    - Set and communicate daily mobility goal to care team and patient/family/caregiver  - Collaborate with rehabilitation services on mobility goals if consulted  - Perform Range of Motion 3 times a day  - Reposition patient every 2 hours  - Dangle patient 3 times a day  - Stand patient 3 times a day  - Ambulate patient 3 times a day  - Out of bed to chair 3 times a day   - Out of bed for meals 3 times a day  - Out of bed for toileting  - Record patient progress and toleration of activity level   Outcome: Progressing     Problem: DISCHARGE PLANNING  Goal: Discharge to home or other facility with appropriate resources  Description: INTERVENTIONS:  - Identify barriers to discharge w/patient and caregiver  - Arrange for needed discharge resources and transportation as appropriate  - Identify discharge learning needs (meds, wound care, etc )  - Arrange for interpretive services to assist at discharge as needed  - Refer to Case Management Department for coordinating discharge planning if the patient needs post-hospital services based on physician/advanced practitioner order or complex needs related to functional status, cognitive ability, or social support system  Outcome: Progressing     Problem: Knowledge Deficit  Goal: Patient/family/caregiver demonstrates understanding of disease process, treatment plan, medications, and discharge instructions  Description: Complete learning assessment and assess knowledge base  Interventions:  - Provide teaching at level of understanding  - Provide teaching via preferred learning methods  Outcome: Progressing     Problem: Nutrition/Hydration-ADULT  Goal: Nutrient/Hydration intake appropriate for improving, restoring or maintaining nutritional needs  Description: Monitor and assess patient's nutrition/hydration status for malnutrition   Collaborate with interdisciplinary team and initiate plan and interventions as ordered  Monitor patient's weight and dietary intake as ordered or per policy  Utilize nutrition screening tool and intervene as necessary  Determine patient's food preferences and provide high-protein, high-caloric foods as appropriate       INTERVENTIONS:  - Monitor oral intake, urinary output, labs, and treatment plans  - Assess nutrition and hydration status and recommend course of action  - Evaluate amount of meals eaten  - Assist patient with eating if necessary   - Allow adequate time for meals  - Recommend/ encourage appropriate diets, oral nutritional supplements, and vitamin/mineral supplements  - Order, calculate, and assess calorie counts as needed  - Recommend, monitor, and adjust tube feedings and TPN/PPN based on assessed needs  - Assess need for intravenous fluids  - Provide specific nutrition/hydration education as appropriate  - Include patient/family/caregiver in decisions related to nutrition  Outcome: Progressing     Problem: Prexisting or High Potential for Compromised Skin Integrity  Goal: Skin integrity is maintained or improved  Description: INTERVENTIONS:  - Identify patients at risk for skin breakdown  - Assess and monitor skin integrity  - Assess and monitor nutrition and hydration status  - Monitor labs   - Assess for incontinence   - Turn and reposition patient  - Assist with mobility/ambulation  - Relieve pressure over bony prominences  - Avoid friction and shearing  - Provide appropriate hygiene as needed including keeping skin clean and dry  - Evaluate need for skin moisturizer/barrier cream  - Collaborate with interdisciplinary team   - Patient/family teaching  - Consider wound care consult   Outcome: Progressing

## 2021-07-24 LAB — BACTERIA BLD CULT: NORMAL

## 2021-07-25 LAB
BACTERIA BLD CULT: NORMAL

## 2021-08-09 NOTE — UTILIZATION REVIEW
Notification of Discharge   This is a Notification of Discharge from our facility [address]  Please be advised that this patient has been discharge from our facility  Below you will find the admission and discharge date and time including the patients disposition  UTILIZATION REVIEW CONTACT:  Gerardo Scott  Utilization   Network Utilization Review Department  Phone: [phone] x carefully listen to the prompts  All voicemails are confidential   Email: [email or web address]     PHYSICIAN ADVISORY SERVICES:  FOR [id number] REVIEW - MEDICAL NECESSITY DENIAL  Phone: [phone]  Fax: [phone]  Email: [email or web address]     PRESENTATION DATE: 7/20/2021  3:38 PM  OBERVATION ADMISSION DATE:   INPATIENT ADMISSION DATE: 7/20/21  6:46 PM   DISCHARGE DATE: 7/23/2021  3:48 PM  DISPOSITION: Left against medical advice or discontinued care Left against medical advice or discontinued care      IMPORTANT INFORMATION:  Send all requests for admission clinical reviews, approved or denied determinations and any other requests to dedicated fax number below belonging to the campus where the patient is receiving treatment   List of dedicated fax numbers:  [address] [place] [address] DENIALS (Administrative/Medical Necessity) [phone]   [address] (Maternity/NICU/Pediatrics) [phone]   Elfida Lips [phone]   Susi Barfield [phone]   Nathalie Monet [phone]   [facility] [address] [facility] [phone]   [facility]  [phone]   [address] [facility] [address] [facility]  [address] [facility] [address] [facility] [phone]

## 2022-01-11 NOTE — PLAN OF CARE
LABS ARE DUE    Problem: PAIN - ADULT  Goal: Verbalizes/displays adequate comfort level or baseline comfort level  Description  Interventions:  - Encourage patient to monitor pain and request assistance  - Assess pain using appropriate pain scale  - Administer analgesics based on type and severity of pain and evaluate response  - Implement non-pharmacological measures as appropriate and evaluate response  - Consider cultural and social influences on pain and pain management  - Notify physician/advanced practitioner if interventions unsuccessful or patient reports new pain  Outcome: Progressing     Problem: INFECTION - ADULT  Goal: Absence or prevention of progression during hospitalization  Description  INTERVENTIONS:  - Assess and monitor for signs and symptoms of infection  - Monitor lab/diagnostic results  - Monitor all insertion sites, i e  indwelling lines, tubes, and drains  - Monitor endotracheal if appropriate and nasal secretions for changes in amount and color  - [place] appropriate cooling/warming therapies per order  - Administer medications as ordered  - Instruct and encourage patient and family to use good hand hygiene technique  - Identify and instruct in appropriate isolation precautions for identified infection/condition  Outcome: Progressing  Goal: Absence of fever/infection during neutropenic period  Description  INTERVENTIONS:  - Monitor WBC    Outcome: Progressing     Problem: SAFETY ADULT  Goal: Patient will remain free of falls  Description  INTERVENTIONS:  - Assess patient frequently for physical needs  -  Identify cognitive and physical deficits and behaviors that affect risk of falls    -  [place] fall precautions as indicated by assessment   - Educate patient/family on patient safety including physical limitations  - Instruct patient to call for assistance with activity based on assessment  - Modify environment to reduce risk of injury  - Consider OT/PT consult to assist with strengthening/mobility  Outcome: Progressing  Goal: Maintain or return to baseline ADL function  Description  INTERVENTIONS:  -  Assess patient's ability to carry out ADLs; assess patient's baseline for ADL function and identify physical deficits which impact ability to perform ADLs (bathing, care of mouth/teeth, toileting, grooming, dressing, etc )  - Assess/evaluate cause of self-care deficits   - Assess range of motion  - Assess patient's mobility; develop plan if impaired  - Assess patient's need for assistive devices and provide as appropriate  - Encourage maximum independence but intervene and supervise when necessary  - Involve family in performance of ADLs  - Assess for home care needs following discharge   - Consider OT consult to assist with ADL evaluation and planning for discharge  - Provide patient education as appropriate  Outcome: Progressing  Goal: Maintain or return mobility status to optimal level  Description  INTERVENTIONS:  - Assess patient's baseline mobility status (ambulation, transfers, stairs, etc )    - Identify cognitive and physical deficits and behaviors that affect mobility  - Identify mobility aids required to assist with transfers and/or ambulation (gait belt, sit-to-stand, lift, walker, cane, etc )  - [place] fall precautions as indicated by assessment  - Record patient progress and toleration of activity level on Mobility SBAR; progress patient to next Phase/Stage  - Instruct patient to call for assistance with activity based on assessment  - Consider rehabilitation consult to assist with strengthening/weightbearing, etc   Outcome: Progressing     Problem: DISCHARGE PLANNING  Goal: Discharge to home or other facility with appropriate resources  Description  INTERVENTIONS:  - Identify barriers to discharge w/patient and caregiver  - Arrange for needed discharge resources and transportation as appropriate  - Identify discharge learning needs (meds, wound care, etc )  - Arrange for interpretive services to assist at discharge as needed  - Refer to Case Management Department for coordinating discharge planning if the patient needs post-hospital services based on physician/advanced practitioner order or complex needs related to functional status, cognitive ability, or social support system  Outcome: Progressing     Problem: Knowledge Deficit  Goal: Patient/family/caregiver demonstrates understanding of disease process, treatment plan, medications, and discharge instructions  Description  Complete learning assessment and assess knowledge base    Interventions:  - Provide teaching at level of understanding  - Provide teaching via preferred learning methods  Outcome: Progressing

## 2022-06-11 NOTE — PLAN OF CARE
Problem: MOBILITY - ADULT  Goal: Maintain or return to baseline ADL function  Description: INTERVENTIONS:  -  Assess patient's ability to carry out ADLs; assess patient's baseline for ADL function and identify physical deficits which impact ability to perform ADLs (bathing, care of mouth/teeth, toileting, grooming, dressing, etc )  - Assess/evaluate cause of self-care deficits   - Assess range of motion  - Assess patient's mobility; develop plan if impaired  - Assess patient's need for assistive devices and provide as appropriate  - Encourage maximum independence but intervene and supervise when necessary  - Involve family in performance of ADLs  - Assess for home care needs following discharge   - Consider OT consult to assist with ADL evaluation and planning for discharge  - Provide patient education as appropriate  Outcome: Progressing  Goal: Maintains/Returns to pre admission functional level  Description: INTERVENTIONS:  - Perform BMAT or MOVE assessment daily    - Set and communicate daily mobility goal to care team and patient/family/caregiver  - Collaborate with rehabilitation services on mobility goals if consulted  - Perform Range of Motion  times a day  - Reposition patient every  hours    - Dangle patient  times a day  - Stand patient  times a day  - Ambulate patient  times a day  - Out of bed to chair  times a day   - Out of bed for meals  times a day  - Out of bed for toileting  - Record patient progress and toleration of activity level   Outcome: Progressing     Problem: PAIN - ADULT  Goal: Verbalizes/displays adequate comfort level or baseline comfort level  Description: Interventions:  - Encourage patient to monitor pain and request assistance  - Assess pain using appropriate pain scale  - Administer analgesics based on type and severity of pain and evaluate response  - Implement non-pharmacological measures as appropriate and evaluate response  - Consider cultural and social influences on pain Pt with c/o redness to L eye after reports of getting metal shaving in eye yesterday at work. Denies pain or vision changes, just concerned about redness. Using eyedrops at home.    Noted to be hypertensive, reports he was told to f/u with a cardiologist but has not yet.   and pain management  - Notify physician/advanced practitioner if interventions unsuccessful or patient reports new pain  Outcome: Progressing     Problem: INFECTION - ADULT  Goal: Absence or prevention of progression during hospitalization  Description: INTERVENTIONS:  - Assess and monitor for signs and symptoms of infection  - Monitor lab/diagnostic results  - Monitor all insertion sites, i e  indwelling lines, tubes, and drains  - Monitor endotracheal if appropriate and nasal secretions for changes in amount and color  - [place] appropriate cooling/warming therapies per order  - Administer medications as ordered  - Instruct and encourage patient and family to use good hand hygiene technique  - Identify and instruct in appropriate isolation precautions for identified infection/condition  Outcome: Progressing  Goal: Absence of fever/infection during neutropenic period  Description: INTERVENTIONS:  - Monitor WBC    Outcome: Progressing     Problem: SAFETY ADULT  Goal: Maintain or return to baseline ADL function  Description: INTERVENTIONS:  -  Assess patient's ability to carry out ADLs; assess patient's baseline for ADL function and identify physical deficits which impact ability to perform ADLs (bathing, care of mouth/teeth, toileting, grooming, dressing, etc )  - Assess/evaluate cause of self-care deficits   - Assess range of motion  - Assess patient's mobility; develop plan if impaired  - Assess patient's need for assistive devices and provide as appropriate  - Encourage maximum independence but intervene and supervise when necessary  - Involve family in performance of ADLs  - Assess for home care needs following discharge   - Consider OT consult to assist with ADL evaluation and planning for discharge  - Provide patient education as appropriate  Outcome: Progressing  Goal: Maintains/Returns to pre admission functional level  Description: INTERVENTIONS:  - Perform BMAT or MOVE assessment daily    - Set and communicate daily mobility goal to care team and patient/family/caregiver  - Collaborate with rehabilitation services on mobility goals if consulted  - Perform Range of Motion  times a day  - Reposition patient every  hours    - Dangle patient  times a day  - Stand patient  times a day  - Ambulate patient  times a day  - Out of bed to chair  times a day   - Out of bed for meals  times a day  - Out of bed for toileting  - Record patient progress and toleration of activity level   Outcome: Progressing  Goal: Patient will remain free of falls  Description: INTERVENTIONS:  - Educate patient/family on patient safety including physical limitations  - Instruct patient to call for assistance with activity   - Consult OT/PT to assist with strengthening/mobility   - Keep Call bell within reach  - Keep bed low and locked with side rails adjusted as appropriate  - Keep care items and personal belongings within reach  - Initiate and maintain comfort rounds  - Make Fall Risk Sign visible to staff  - Offer Toileting every  Hours, in advance of need  - Initiate/Maintain alarm  - Obtain necessary fall risk management equipment  - Apply yellow socks and bracelet for high fall risk patients  - Consider moving patient to room near nurses station  Outcome: Progressing     Problem: DISCHARGE PLANNING  Goal: Discharge to home or other facility with appropriate resources  Description: INTERVENTIONS:  - Identify barriers to discharge w/patient and caregiver  - Arrange for needed discharge resources and transportation as appropriate  - Identify discharge learning needs (meds, wound care, etc )  - Arrange for interpretive services to assist at discharge as needed  - Refer to Case Management Department for coordinating discharge planning if the patient needs post-hospital services based on physician/advanced practitioner order or complex needs related to functional status, cognitive ability, or social support system  Outcome: Progressing Problem: Knowledge Deficit  Goal: Patient/family/caregiver demonstrates understanding of disease process, treatment plan, medications, and discharge instructions  Description: Complete learning assessment and assess knowledge base    Interventions:  - Provide teaching at level of understanding  - Provide teaching via preferred learning methods  Outcome: Progressing

## 2022-09-28 NOTE — SOCIAL WORK
Pt was actively abusing heroin prior to admit  CM met w/ pt to discuss this and offered pt a referral to the [facility] D&A program  Pt dismissed CM, stated she had a "slip up" and does not have a problem with her heroin use  Pt declined offer for help  CM will [place] back to offer D&A help again  CM to follow  No

## 2022-10-12 PROBLEM — A41.9 SEPSIS (HCC): Status: RESOLVED | Noted: 2020-07-15 | Resolved: 2022-10-12

## 2024-01-01 NOTE — ASSESSMENT & PLAN NOTE
· Patient was complaining of severe back pain mainly in the lower part of the back and also in the sacral region  · Patient had MRI of the lumbar and thoracic spine during the recent hospital stay in [facility] which was unremarkable  · - continue robaxin to 750 mg q 6  · - continue oxy to 10 mg and 15 mg   · - continue iv morphine 4 mg to q3 hrs prn for breakthrough  · -continue tylenol atc pt can refuse (she states it makes her break out in profuse sweats)   · - continue lidocaine patch  · -continue gabapentin to 200mg TID - I offered increase in gabapentin but pt refused as she says it did not help in past and had w/drawal when she stopped it abruptly  · Iliacus muscle abscess is noted on CT scan, continue antibiotic treatment as above, no indication for drainage at this time  · Repeat CT scan shows improvement in septic emboli to iliacus muscle  · ketamine infusion discontinued  · Patient has been reluctant with interventions and procedures  She cites that she wants IV pain meds before intervention  Extensive discussion regarding acute pain service recommendations  Will proceed with following recommendations by acute pain service  · Dr Emmanuel Crenshaw spoke to pt today and pt agreed to MRI of hip w/ just Ativan and w/o anesthesia  · Hold off on thoracolumbar MRI as pain in right hip  · APS will come up w/ opioid taper which we will strictly follow as pt cannot be discharged on any opioids  unknown

## 2024-02-21 PROBLEM — N10 ACUTE PYELONEPHRITIS: Status: RESOLVED | Noted: 2020-07-15 | Resolved: 2024-02-21

## 2024-02-21 PROBLEM — N39.0 UTI (URINARY TRACT INFECTION): Status: RESOLVED | Noted: 2020-10-24 | Resolved: 2024-02-21

## 2024-05-29 NOTE — PROGRESS NOTES
[facility] Internal Medicine  Progress Note - Essie Ballard 1990, 34 y o  female MRN: [id number]  Unit/Bed#: -01 Encounter: [id number]  Primary Care Provider: Mckenzie Ricks PA-C   Date and time admitted to hospital: 10/21/2020 11:26 PM    * Heroin withdrawal ([address] )  Assessment & Plan  · Patient has history of IV drug abuse; reports most recently using IV heroin, cocaine, and benzodiazepines from 10/2-10/21  · Patient reports withdrawal symptoms are improving since admission  · Started on Suboxone by acute pain management - patient intermittently refusing suboxone (refused yesterday), clonidine, and all other medications  ·  Acute Pain management consulted for management of withdrawal symptoms   · Continue clonidine 0 1 Q8H, Tylenol 975 mg Q8H, motrin 800 mg q8hrs, gabapentin 300 HS, melatonin 3 mg HS, robaxin 750 mg q8hr  · Continue zofran prn, loperamide prn, HydroxyzIne 50 mg Q6H prn agitation and anxiety   · Continue lorazepam 1 mg PO q6hrs PRN anxiety  · Continue cymbalta, remeron, zyprexa    Suspected UTI (urinary tract infection)  Assessment & Plan  · UA consistent with UTI  · Started on keflex for sacroilitis (did not comply with abx regimen on prior discharge)  · Continue keflex and follow up on final urine culture  · Continue pyridium for pain    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  Assessment & Plan  · History of IV drug abuse  · Underwent tricuspid valve repair 9/10/2020  · Infectious disease consulted   · Discussed with CT surgery 10/24  Incision appears clean and dry and intact  · From their standpoint, continue AC for 3 months total  No need for ASA, statin     · AC for DVT, not surgical intervention    Bacteremia due to Staphylococcus aureus  Assessment & Plan  · Patient left AMA on multiple occasion  · Completed antibiotics on 9/27/2020 which was 6 weeks from 1st negative blood culture  · 1 set of blood cultures were obtained at [facility] in [facility] 5 days ago  I confirmed with them 10/24 that this were negative  · Repeat blood cultures pending    Sacroiliitis [facility])  Assessment & Plan  · CT scan performed of the right lower extremity on 8/21/2020 showed a 9 mm collection raising concern for right ilacus muscle abscess sacroiliitis  · MRI performed on 8/31/2020 was noted to show sacroiliitis that was felt to be septic in nature  · MRI done on 9/24/2020 shows no intramuscular abscess  · CRP is elevated on admission  · Patient was discharged with cefadroxil 500 mg p o  Q 12 hours until CRP normalizes and was recommended follow-up with Infectious Disease 2-4 weeks after discharge  · The patient never followed up nor did she take these antibiotics  · ID started PO keflex - should remain on this until ESR normalizes  · Outpatient ESR monthly    Chronic anticoagulation  Assessment & Plan  · Pt has a history of 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  · Continue Eliquis 5 mg for 3 months total (through beginning of January)    Bipolar 1 disorder ([facility] [address] )  Assessment & Plan  · Continue cymbalta 60 mg, remeron 30 mg, zyprexa 5 mg     Chronic hepatitis C virus infection ([facility] )  Assessment & Plan  · During last admission was tested for HIV and was negative  · Recommended to have outpatient follow-up with GI      VTE Pharmacologic Prophylaxis: VTE Score: 8 High Risk - Pharmacological DVT Prophylaxis Ordered: Apixaban (Eliquis)  Sequential Compression Devices Ordered  Patient Centered Rounds: I have performed bedside rounds with nursing staff today  Discussions with Specialists or Other Care Team Provider: ID    Education and Discussions with Family / Patient: Patient declined call to   Time Spent for Care: 30 minutes  More than 50% of total time spent on counseling and coordination of care as described above      Current Length of Stay: 3 day(s)  Current Patient Status: Inpatient   Certification Statement: The patient will continue to require additional inpatient hospital stay due to heroin withdrawal  follow urine culture  will discuss with acute pain service regarding plan of care upon discharge  Discharge Plan: Anticipate discharge in 48 hrs to drug rehab vs  home    Code Status: Level 1 - Full Code    Subjective:   Still having pain when urinates  Reports that she still has pain over her sternal scar  Reports that this has been present since her surgery  It has not changed  She feels like the withdrawal symptoms are improving    Objective:     Vitals:   No data recorded  HR:  [67] 67  BP: ()/(50-68) 92/50  Body mass index is 25 79 kg/m²  Input and Output Summary (last 24 hours): Intake/Output Summary (Last 24 hours) at 10/25/2020 0905  Last data filed at 10/24/2020 1701  Gross per 24 hour   Intake 240 ml   Output 400 ml   Net -160 ml       Physical Exam:   Physical Exam  Vitals signs and nursing note reviewed  Constitutional:       General: She is not in acute distress  Appearance: Normal appearance  She is not diaphoretic  Comments: No diaphoresis  HENT:      Head: Normocephalic and atraumatic  Mouth/Throat:      Mouth: Mucous membranes are moist    Cardiovascular:      Rate and Rhythm: Normal rate and regular rhythm  Pulmonary:      Effort: Pulmonary effort is normal       Breath sounds: Normal breath sounds  No stridor  No wheezing, rhonchi or rales  Comments: Midline sternal scar  Abdominal:      General: Bowel sounds are normal       Palpations: Abdomen is soft  There is no mass  Tenderness: There is no abdominal tenderness  There is no guarding  Musculoskeletal:      Right lower leg: No edema  Left lower leg: No edema  Skin:     General: Skin is warm and dry  Neurological:      Mental Status: She is alert  Comments: Asleep however arouses easily  Follows commands  Not fully cooperative with neurologic exam   Pupils equal and round approximately 3 mm    Does not appear significantly tremulous   Psychiatric:      Comments: Flat affect        Additional Data:    Labs:  Results from last 7 days   Lab Units 10/24/20  1157   WBC Thousand/uL 5 86   HEMOGLOBIN g/dL 11 1*   HEMATOCRIT % 35 6   PLATELETS Thousands/uL 415*   NEUTROS PCT % 51   LYMPHS PCT % 38   MONOS PCT % 6   EOS PCT % 5     Results from last 7 days   Lab Units 10/24/20  1157   SODIUM mmol/L 142   POTASSIUM mmol/L 4 3   CHLORIDE mmol/L 111*   CO2 mmol/L 26   BUN mg/dL 18   CREATININE mg/dL 0 74   ANION GAP mmol/L 5   CALCIUM mg/dL 9 8   ALBUMIN g/dL 3 8   TOTAL BILIRUBIN mg/dL 0 27   ALK PHOS U/L 114   ALT U/L 31   AST U/L 22   GLUCOSE RANDOM mg/dL 113     Results from last 7 days   Lab Units 10/24/20  1157   INR  1 22*             Results from last 7 days   Lab Units 10/22/20  0929   PROCALCITONIN ng/ml <0 05       Lines/Drains:  Invasive Devices     Peripherally Inserted Central Catheter Line            PICC Line 10/24/20 Left Brachial less than 1 day                Telemetry:        Imaging: Reviewed radiology reports from this admission including: chest xray    Recent Cultures (last 7 days):   Results from last 7 days   Lab Units 10/24/20  1341   BLOOD CULTURE  Received in Microbiology Lab  Culture in Progress  Received in Microbiology Lab  Culture in Progress         Last 24 Hours Medication List:   Current Facility-Administered Medications   Medication Dose Route Frequency Provider Last Rate    acetaminophen  650 mg Oral Q6H PRN Nano Wharton MD      acetaminophen  975 mg Oral [facility] Nano Wharton MD      aluminum-magnesium hydroxide-simethicone  30 mL Oral Q6H PRN Nano Wharton MD      apixaban  5 mg Oral BID Nano Wharton MD      ascorbic acid  500 mg Oral Daily Nano Wharton MD      aspirin  81 mg Oral Daily Nano Wharton MD      buprenorphine-naloxone  1 Film Sublingual Daily Yoon Giles PA-C      calcium acetate  667 mg Oral TID With Meals Nano Wharton MD  cephalexin  500 mg Oral Q6H Spencer Nash MD      cholecalciferol  1,000 Units Oral Daily Jennifer Chandler MD      cloNIDine  0 1 mg Oral [facility] Jennifer Chandler MD      docusate sodium  100 mg Oral BID Jennifer Chandler MD      DULoxetine  60 mg Oral Daily Jennifer Chandler MD      ferrous sulfate  325 mg Oral Daily With Breakfast Jennifer Chandler MD      gabapentin  300 mg Oral HS Jennifer Chandler MD      hydrOXYzine HCL  25 mg Oral Q6H PRN Jennifer Chandler MD      ibuprofen  800 mg Oral [facility] Jennifer Chandler MD      loperamide  4 mg Oral 4x Daily PRN Jennifer Chandler MD      LORazepam  1 mg Oral Q6H PRN Jennifer Chandler MD      magnesium oxide  400 mg Oral BID Jennifer Chandler MD      melatonin  3 mg Oral HS Jennifer Chandler MD      methocarbamol  750 mg Oral [facility] Jennifer Chandler MD      metoprolol tartrate  12 5 mg Oral Q12H [facility] Jennifer Chandler MD      mirtazapine  30 mg Oral HS Jennifer Chandler MD      OLANZapine  5 mg Oral HS Jennifer Chandler MD      ondansetron  4 mg Oral Q6H PRN Jennifer Chandler MD      pantoprazole  40 mg Oral Daily Before Breakfast Jennifer Chandler MD      phenazopyridine  100 mg Oral TID With Meals Kesha Chen PA-C      saccharomyces boulardii  250 mg Oral BID Jennifer Chandler MD      senna  8 6 mg Oral BID Jennifer Chandler MD          Today, Patient Was Seen By: Graciela Chapman PA-C    ** Please Note: Dictation voice to text software may have been used in the creation of this document   ** Walk in

## 2025-04-07 NOTE — PROGRESS NOTES
Progress Note - Cardiothoracic Surgery   Pablo Mendez 34 y o  female MRN: [id number]  Unit/Bed#: [facility] 421-01 Encounter: [id number]  Tricuspid Valve Endocarditis  S/P tricuspid valve repair; POD # 12    24 Hour Events: No events      Medications:   Scheduled Meds:  Current Facility-Administered Medications   Medication Dose Route Frequency Provider Last Rate    acetaminophen  975 mg Oral Q8H PRN Diane Pino PA-C      ascorbic acid  500 mg Oral Daily Yael Beltran PA-C      aspirin  81 mg Oral Daily Dimas Adams PA-C      bisacodyl  10 mg Rectal Daily PRN Dimas Adams PA-C      calcium acetate  667 mg Oral TID With Meals Dimas Adams PA-C      calcium carbonate  1,000 mg Oral Daily PRN Dimas Adams PA-C      cefazolin  2,000 mg Intravenous Q8H Wiliam Álvarez MD Stopped (09/22/20 0045)    cholecalciferol  1,000 Units Oral Daily Nany Cuevas MD      dicyclomine  10 mg Oral TID PRN Dimas Adams PA-C      docusate sodium  100 mg Oral BID Dimas Adams PA-C      DULoxetine  60 mg Oral Daily Dimas Adams PA-C      ergocalciferol  50,000 Units Oral Once per day on Mon Wed Fri Lindsay Marcano PA-C      ferrous sulfate  325 mg Oral Daily With Breakfast Amara Browning PA-C      fondaparinux  2 5 mg Subcutaneous Q24H Amara Browning PA-C      glycopyrrolate  0 2 mg Intravenous Q4H PRN Dimas Adams PA-C      HYDROmorphone  1 mg Intravenous Q4H PRN Diane Pino PA-C      iohexol  50 mL Oral 90 min pre-procedure Dimas Adams PA-C      lidocaine  2 patch Topical Daily Dimas Adams PA-C      LORazepam  1 mg Intravenous Q2H PRN Cynthia See PA-C      magnesium oxide  400 mg Oral BID Yael Beltran PA-C      melatonin  6 mg Oral HS Yael Beltran PA-C      methocarbamol  750 mg Oral Q8H [facility] Diane Pino PA-C      miconazole   Topical BID Dimas Adams PA-C      mirtazapine  30 mg Oral HS Amara Browning PA-C      OLANZapine 5 mg Oral HS Amara PARR MILLIE Browning      ondansetron  4 mg Intravenous Q6H PRN Thermochristi Caleb, MILLIE      oxyCODONE  10 mg Oral Q4H PRN Gibran Dasilva PA-C      pantoprazole  40 mg Oral Daily Thermon Caleb, MILLIE      polyethylene glycol  17 g Oral Daily Thermon Caleb, MILLIE      saccharomyces boulardii  250 mg Oral BID Thermon Caleb, MILLIE      senna  1 tablet Oral BID Thermon Caleb, MILLIE       Continuous Infusions:   PRN Meds:   acetaminophen    bisacodyl    calcium carbonate    dicyclomine    glycopyrrolate    HYDROmorphone    LORazepam    ondansetron    oxyCODONE    Vitals:   Vitals:    09/21/20 1500 09/21/20 1946 09/22/20 0302 09/22/20 0600   BP: 113/63 98/54 102/59    BP Location: Right arm Right arm Right arm    Pulse: 80 71 74    Resp: 16 16 18    Temp: 98 1 °F (36 7 °C) 98 7 °F (37 1 °C) 98 5 °F (36 9 °C)    TempSrc: Oral Oral Oral    SpO2: 100% 98% 99%    Weight:    67 9 kg (149 lb 11 1 oz)   Height:         Bp x24hrs: /50-60    Telemetry: NSR; Heart Rate: 91    Respiratory:   SpO2: SpO2: 99 %, SpO2 Activity: SpO2 Activity: At Rest, SpO2 Device: O2 Device: None (Room air); Room Air    Intake/Output:   I/O       09/20 0701 - 09/21 0700 09/21 0701 - 09/22 0700 09/22 0701 - 09/23 0700    P  O  300      IV Piggyback 50 50     Total Intake(mL/kg) 350 (5 1) 50 (0 7)     Urine (mL/kg/hr) 750 (0 5)      Stool 0      Total Output 750      Net -400 +50            Unmeasured Urine Occurrence 4 x      Unmeasured Stool Occurrence 1 x          No UOP recorded/24hrs    Chest tube Output:  CTs & EPWs have been discontinued    Weights:   Weight (last 2 days)     Date/Time   Weight    09/22/20 0600   67 9 (149 69)    09/20/20 0854   68 3 (150 6)            Admit weight: 67 9kg - euvolemic from admission weight    Results:   NO NEW CBC OR BMP  Results from last 7 days   Lab Units 09/20/20  0900 09/19/20  0511 09/17/20  0520  09/16/20  0432   WBC Thousand/uL 8 09  --   --   --  5 92   HEMOGLOBIN g/dL 9 0* 8 0* 7 8*   < > 6 9*   HEMATOCRIT % 28 6* 25 1* 24 8*   < > 23 0*   PLATELETS Thousands/uL 413*  --   --   --  253    < > = values in this interval not displayed  Results from last 7 days   Lab Units 09/20/20  0900 09/19/20  0511 09/17/20  0520   SODIUM mmol/L 139 142 141   POTASSIUM mmol/L 4 4 4 0 4 2   CHLORIDE mmol/L 107 107 106   CO2 mmol/L 28 28 28   BUN mg/dL 11 11 9   CREATININE mg/dL 0 59* 0 49* 0 43*   CALCIUM mg/dL 9 0 8 8 8 6     Results from last 7 days   Lab Units 09/21/20  0909 09/20/20  0900 09/19/20  0511   INR  1 70* 1 68* 1 75*     Point of care glucose: none recorded    Studies:  No new studies    I have personally reviewed pertinent reports  and I have personally reviewed pertinent films in PACS    Invasive Lines/Tubes:  Invasive Devices     Peripherally Inserted Central Catheter Line            PICC Line 08/26/20 26 days                Physical Exam:    HEENT/NECK:  PERRLA  No jugular venous distention  Cardiac: Regular rate and rhythm  Pulmonary:  Breath sounds clear bilaterally  Abdomen:  Normal bowel sounds  Incisions: Sternum is stable  Incision is clean, dry, and intact  Extremities: Extremities warm/dry and Trace edema B/L  Neuro: Alert and oriented X 3  Skin: Warm/Dry, without rashes or lesions  Assessment:  Principal Problem:    Bacteremia due to Staphylococcus aureus  Active Problems:    Acute bacterial endocarditis    Septic embolism (HCC)    Bipolar 1 disorder ([facility])    Right hip pain    Intravenous drug abuse ([facility] [address] )    DVT of axillary vein, acute left (HCC)    Transaminitis    Rash    Anemia    Hyperphosphatemia    S/P TVR (tricuspid valve repair)     Tricuspid Valve Endocarditis  S/P tricuspid valve repair; POD # 12    1  Cardiac:   NSR; HR/BP well-controlled  No beta blocker for prior hypotension  Continue ASA therapy  Statin not indicated  Epicardial pacing wires out  PICC for abx  Continue DVT prophylaxis    2   Pulmonary:   Good Room air oxygen saturation; Continue incentive spirometry/Coughing/Deep breathing exercises  Chest tubes have been discontinued    3  Renal:   Intake/Output net: (+)50 mL/24 hours -- likely (-) given innacurate I/Os  Autodiuresing sell    4  Neuro:  Neurologically intact; No active issues  Incisional pain well-controlled  Continue Tylenol, 975 mg PO q 8, standing dose  Continue Oxycodone, 2 5 to 5 mg PO q 4 hours prn pain  PRN Dilaudid per APS  Continue Robaxin per APS                    Continue Lidoderm        Continue Remeron        Continue Zyprexa    5  GI:  Tolerating TLC 2 3 gm sodium diet  Maintain 1800 mL daily fluid restriction   Continue stool softeners and prn suppository  Continue GI prophylaxis    6  Endo:   No history of diabetes; Glucose well-controlled with sliding scale coverage    7    Hematology:    Continue iron/vitmain C supplementation              Hyperphosphatemia, endo following              Vitamin D deficiency, continue supplementation              Endocarditis                          Afebrile                          ID following                          Ancef thorough 9 /27 -- need to determine LD as for discharge 9/27 vs 9/28                          MRI pending for sacroilitis    8  Disposition:      Ambulating independently, Anticipate discharge to home once abx complete     VTE Pharmacologic Prophylaxis: Fondaparinux (Arixtra)  VTE Mechanical Prophylaxis: sequential compression device    Collaborative rounds completed with SARAH Hobbs    Plan of care discussed with bedside nurse    SIGNATURE: Dileep Amin PA-C  DATE: September 22, 2020  TIME: 7:32 AM Yes

## (undated) DEVICE — GLOVE INDICATOR PI UNDERGLOVE SZ 8.5 BLUE

## (undated) DEVICE — ELECTRODE BLADE E-Z CLEAN 4IN -0014A

## (undated) DEVICE — GLOVE SRG BIOGEL 8

## (undated) DEVICE — ELECTRODE BLADE MOD E-Z CLEAN 4IN -0014AM

## (undated) DEVICE — SUT PROLENE 4-0 BB 36 IN 8581H

## (undated) DEVICE — 32 FR RIGHT ANGLE – SOFT PVC CATHETER: Brand: PVC THORACIC CATHETERS

## (undated) DEVICE — CATH STRAIGHT RED RUBBER 20 FR

## (undated) DEVICE — 40601 PROLONGED POSITIONING SYSTEM: Brand: 40601 PROLONGED POSITIONING SYSTEM

## (undated) DEVICE — IV CATH INTROCAN 18G X 1 1/4 SAFETY

## (undated) DEVICE — 32 FR STRAIGHT – SOFT PVC CATHETER: Brand: PVC THORACIC CATHETERS

## (undated) DEVICE — TRAY FOLEY 16FR SURESTEP TEMP SENS URIMETER STAT LOK

## (undated) DEVICE — SUT VICRYL 2 TP-1 27 IN J849G

## (undated) DEVICE — STERNAL WIRE

## (undated) DEVICE — INTENDED FOR TISSUE SEPARATION, AND OTHER PROCEDURES THAT REQUIRE A SHARP SURGICAL BLADE TO PUNCTURE OR CUT.: Brand: BARD-PARKER ® CARBON RIB-BACK BLADES

## (undated) DEVICE — SILVER-COATED ANTIBACTERIAL BARRIER DRESSING: Brand: ACTICOAT SURGIC 10X25CM 5PK US

## (undated) DEVICE — SYRINGE 10ML LL

## (undated) DEVICE — BLANKET HYPOTHERMIA ADULT GAYMAR

## (undated) DEVICE — SUT MONOCRYL PLUS 3-0 PS-2 27 IN MCP427H

## (undated) DEVICE — SUT ETHIBOND 2-0 SH-1/SH-1 30 IN X763H

## (undated) DEVICE — 2000CC GUARDIAN II: Brand: GUARDIAN

## (undated) DEVICE — SUT SILK 0 CT-1 30 IN 424H

## (undated) DEVICE — NEEDLE 25G X 1 1/2

## (undated) DEVICE — SUT PROLENE 3-0 SH 36 IN 8522H

## (undated) DEVICE — EVERGRIP INSERT SET 61MM: Brand: FOGARTY EVERGRIP

## (undated) DEVICE — FILTER SMOKE EVAC VIROSAFE

## (undated) DEVICE — ANTIBACTERIAL UNDYED BRAIDED (POLYGLACTIN 910), SYNTHETIC ABSORBABLE SUTURE: Brand: COATED VICRYL

## (undated) DEVICE — THERMOFLECT BLANKET, L, 25EA                               TS THERMOFLECT BLANKET, 48" X 84", SILVER, 5/BG, 5 BG/CS NW: Brand: THERMOFLECT

## (undated) DEVICE — GLOVE INDICATOR PI UNDERGLOVE SZ 8 BLUE

## (undated) DEVICE — PACK VALVE PBDS

## (undated) DEVICE — SUTURE GUIDE

## (undated) DEVICE — STERILE MANDIBLE PACK: Brand: CARDINAL HEALTH

## (undated) DEVICE — SUT SILK 2 60 IN SA8H

## (undated) DEVICE — SUCTION CATH 18 FR

## (undated) DEVICE — GAUZE SPONGES,16 PLY: Brand: CURITY

## (undated) DEVICE — SUT PROLENE 5-0 C-1/C-1 36 IN 8321H

## (undated) DEVICE — PLUMEPEN PRO 10FT

## (undated) DEVICE — SUT SILK 2-0 SH CR/8 18 IN C012D

## (undated) DEVICE — SUT ETHIBOND 2-0 V7/V7 30 IN 10X72

## (undated) DEVICE — DRESSING ALLEVYN LIFE HEEL 25 X 25.2CM

## (undated) DEVICE — OASIS DRAIN, SINGLE, INLINE & ATS COMPATIBLE: Brand: OASIS

## (undated) DEVICE — SPECIMEN CONTAINER STERILE PEEL PACK

## (undated) DEVICE — SUT POLYESTER TAPE D-G 8618-00

## (undated) DEVICE — SUT PDS PLUS 1 CTB 36 IN PDPB359T

## (undated) DEVICE — SUT VICRYL PLUS 1 CTB-1 36 IN VCPB947H

## (undated) DEVICE — LIGHT HANDLE COVER SLEEVE DISP BLUE STELLAR

## (undated) DEVICE — 3000CC GUARDIAN II: Brand: GUARDIAN

## (undated) DEVICE — PLEDGET CARDIO PTFE 9.5 X 4.8 SOFT LF (6EA/PK)

## (undated) DEVICE — SYRINGE 20ML LL

## (undated) DEVICE — PVC URETHRAL CATHETER: Brand: DOVER

## (undated) DEVICE — EVERGRIP INSERT SET 86MM: Brand: FOGARTY EVERGRIP

## (undated) DEVICE — RECIP.STERNUM SAW BLADE 34/7.5/0.7MM: Brand: AESCULAP

## (undated) DEVICE — GLOVE SRG BIOGEL ECLIPSE 8

## (undated) DEVICE — CATHETER PLUG WITH CAP: Brand: DOVER

## (undated) DEVICE — BONE WAX WHITE: Brand: BONE WAX WHITE

## (undated) DEVICE — SUT CHROMIC 3-0 SH 27 IN G122H

## (undated) DEVICE — SUT ETHIBOND 2-0 SH/SH 36 IN X523H